# Patient Record
Sex: FEMALE | Race: WHITE | NOT HISPANIC OR LATINO | Employment: FULL TIME | URBAN - METROPOLITAN AREA
[De-identification: names, ages, dates, MRNs, and addresses within clinical notes are randomized per-mention and may not be internally consistent; named-entity substitution may affect disease eponyms.]

---

## 2017-01-12 DIAGNOSIS — I10 HYPERTENSION, ESSENTIAL: Primary | ICD-10-CM

## 2017-01-12 RX ORDER — METOPROLOL TARTRATE 25 MG/1
25 TABLET, FILM COATED ORAL 2 TIMES DAILY
Qty: 180 TABLET | Refills: 3 | OUTPATIENT
Start: 2017-01-12 | End: 2018-05-23 | Stop reason: SDUPTHER

## 2017-01-13 ENCOUNTER — CLINICAL SUPPORT (OUTPATIENT)
Dept: ELECTROPHYSIOLOGY | Facility: CLINIC | Age: 67
End: 2017-01-13
Payer: COMMERCIAL

## 2017-01-13 DIAGNOSIS — Z95.0 CARDIAC PACEMAKER IN SITU: ICD-10-CM

## 2017-01-13 DIAGNOSIS — I49.5 SSS (SICK SINUS SYNDROME): ICD-10-CM

## 2017-01-13 PROCEDURE — 93293 PM PHONE R-STRIP DEVICE EVAL: CPT | Mod: S$GLB,,, | Performed by: INTERNAL MEDICINE

## 2017-02-01 ENCOUNTER — PATIENT MESSAGE (OUTPATIENT)
Dept: INTERNAL MEDICINE | Facility: CLINIC | Age: 67
End: 2017-02-01

## 2017-02-01 DIAGNOSIS — I10 ESSENTIAL HYPERTENSION: Chronic | ICD-10-CM

## 2017-02-01 RX ORDER — LISINOPRIL 20 MG/1
20 TABLET ORAL DAILY
COMMUNITY
End: 2017-02-01 | Stop reason: SDUPTHER

## 2017-02-02 RX ORDER — LISINOPRIL 20 MG/1
20 TABLET ORAL DAILY
Qty: 90 TABLET | Refills: 3 | Status: SHIPPED | OUTPATIENT
Start: 2017-02-02 | End: 2018-01-22 | Stop reason: SDUPTHER

## 2017-02-07 ENCOUNTER — OFFICE VISIT (OUTPATIENT)
Dept: PSYCHIATRY | Facility: CLINIC | Age: 67
End: 2017-02-07
Payer: COMMERCIAL

## 2017-02-07 DIAGNOSIS — F41.9 ANXIETY: ICD-10-CM

## 2017-02-07 DIAGNOSIS — F98.8 ADD (ATTENTION DEFICIT DISORDER): ICD-10-CM

## 2017-02-07 DIAGNOSIS — F33.41 MAJOR DEPRESSIVE DISORDER, RECURRENT EPISODE, IN PARTIAL REMISSION: Primary | ICD-10-CM

## 2017-02-07 PROCEDURE — 90834 PSYTX W PT 45 MINUTES: CPT | Mod: S$GLB,,, | Performed by: SOCIAL WORKER

## 2017-02-07 NOTE — PROGRESS NOTES
Individual Psychotherapy (PhD/LCSW)    2/7/2017    Site: [facility]    Therapeutic Intervention: Met with patient alone.  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     Treatment plan:  · Target symptoms: depression, anxiety, distractibility  · Why chosen therapy is appropriate versus another modality: relevant to diagnosis  · Outcome monitoring methods: self-report  · Therapeutic intervention type: insight oriented psychotherapy    Risk parameters:  Patient reports no suicidal ideation  Patient reports no homicidal ideation  Patient reports no self-injurious behavior  Patient reports no violent behavior    Verbal deficits: None    Patient's response to intervention:  The patient's response to intervention is motivated    Progress toward goals and other mental status changes:  The patient's progress toward goals is good    Diagnosis: Major depression, recurrent, partial remission, anxiety,  ADD     Plan:  individual psychotherapy    Return to clinic: as scheduled

## 2017-02-17 ENCOUNTER — OFFICE VISIT (OUTPATIENT)
Dept: PSYCHIATRY | Facility: CLINIC | Age: 67
End: 2017-02-17
Payer: COMMERCIAL

## 2017-02-17 DIAGNOSIS — F33.1 MAJOR DEPRESSIVE DISORDER, RECURRENT, MODERATE: Primary | ICD-10-CM

## 2017-02-17 DIAGNOSIS — F98.8 ADD (ATTENTION DEFICIT DISORDER): ICD-10-CM

## 2017-02-17 DIAGNOSIS — F41.9 ANXIETY: ICD-10-CM

## 2017-02-17 PROCEDURE — 90834 PSYTX W PT 45 MINUTES: CPT | Mod: S$GLB,,, | Performed by: SOCIAL WORKER

## 2017-02-17 NOTE — PROGRESS NOTES
"Individual Psychotherapy (PhD/LCSW)    2/17/2017    Site: [facility]    Therapeutic Intervention: Met with patient alone.  Outpatient - Insight oriented psychotherapy 45 min - CPT code 02541    Chief complaint/reason for encounter: depression, anxiety, distractibility     Interval history and content of current session: Pt angry at things at work, in the world. Talk to pt about how her anger distracts her from her fear and her personal needs. Pt is fearful about her future, angry that "this is not the life I was supposed to have". 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 Work with pt around her issues with anger and underlying fear. Encourage self-acceptance and self-nurturing.    Treatment plan:  · Target symptoms: depression, anxiety, distractibility  · Why chosen therapy is appropriate versus another modality: relevant to diagnosis  · Outcome monitoring methods: self-report  · Therapeutic intervention type: insight oriented psychotherapy    Risk parameters:  Patient reports no suicidal ideation  Patient reports no homicidal ideation  Patient reports no self-injurious behavior  Patient reports no violent behavior    Verbal deficits: None    Patient's response to intervention:  The patient's response to intervention is motivated    Progress toward goals and other mental status changes:  The patient's progress toward goals is limited    Diagnosis: Major depression, recurrent moderate, anxiety,  ADD     Plan:  individual psychotherapy    Return to clinic: as scheduled  "

## 2017-02-24 ENCOUNTER — OFFICE VISIT (OUTPATIENT)
Dept: PSYCHIATRY | Facility: CLINIC | Age: 67
End: 2017-02-24
Payer: COMMERCIAL

## 2017-02-24 DIAGNOSIS — F33.1 MAJOR DEPRESSIVE DISORDER, RECURRENT, MODERATE: Primary | ICD-10-CM

## 2017-02-24 DIAGNOSIS — F41.9 ANXIETY: ICD-10-CM

## 2017-02-24 PROCEDURE — 99999 PR PBB SHADOW E&M-EST. PATIENT-LVL I: CPT | Mod: PBBFAC,,, | Performed by: SOCIAL WORKER

## 2017-02-24 PROCEDURE — 90834 PSYTX W PT 45 MINUTES: CPT | Mod: S$GLB,,, | Performed by: SOCIAL WORKER

## 2017-02-24 NOTE — PROGRESS NOTES
Individual Psychotherapy (PhD/LCSW)    2/24/2017    Site: [facility]    Therapeutic Intervention: Met with patient alone.  Outpatient - Insight oriented psychotherapy 45 min - CPT code 66508    Chief complaint/reason for encounter: depression, anxiety, distractibility     Interval history and content of current session: Pt considering what to say to supervisor who wants to discuss her comment in a meeting that she doesn't feel safe expressing herself. Help pt identify concerns she can voice and focus on solutions she can propose. Pt is cooperative and perceptive.    Treatment plan:  · Target symptoms: depression, anxiety, distractibility  · Why chosen therapy is appropriate versus another modality: relevant to diagnosis  · Outcome monitoring methods: self-report  · Therapeutic intervention type: insight oriented psychotherapy    Risk parameters:  Patient reports no suicidal ideation  Patient reports no homicidal ideation  Patient reports no self-injurious behavior  Patient reports no violent behavior    Verbal deficits: None    Patient's response to intervention:  The patient's response to intervention is motivated    Progress toward goals and other mental status changes:  The patient's progress toward goals is good    Diagnosis: Major depression, recurrent moderate, anxiety,  ADD     Plan:  individual psychotherapy    Return to clinic: as scheduled

## 2017-03-17 ENCOUNTER — PATIENT MESSAGE (OUTPATIENT)
Dept: CARDIOLOGY | Facility: CLINIC | Age: 67
End: 2017-03-17

## 2017-03-23 ENCOUNTER — TELEPHONE (OUTPATIENT)
Dept: CARDIOLOGY | Facility: CLINIC | Age: 67
End: 2017-03-23

## 2017-03-23 ENCOUNTER — LAB VISIT (OUTPATIENT)
Dept: LAB | Facility: HOSPITAL | Age: 67
End: 2017-03-23
Attending: INTERNAL MEDICINE
Payer: COMMERCIAL

## 2017-03-23 DIAGNOSIS — E78.5 HYPERLIPIDEMIA: Chronic | ICD-10-CM

## 2017-03-23 DIAGNOSIS — I48.0 PAF (PAROXYSMAL ATRIAL FIBRILLATION): Chronic | ICD-10-CM

## 2017-03-23 DIAGNOSIS — I10 ESSENTIAL HYPERTENSION: Chronic | ICD-10-CM

## 2017-03-23 LAB
ALBUMIN SERPL BCP-MCNC: 3.9 G/DL
ALP SERPL-CCNC: 86 U/L
ALT SERPL W/O P-5'-P-CCNC: 43 U/L
ANION GAP SERPL CALC-SCNC: 10 MMOL/L
AST SERPL-CCNC: 39 U/L
BILIRUB SERPL-MCNC: 0.5 MG/DL
BUN SERPL-MCNC: 28 MG/DL
CALCIUM SERPL-MCNC: 9.9 MG/DL
CHLORIDE SERPL-SCNC: 106 MMOL/L
CHOLEST/HDLC SERPL: 3.3 {RATIO}
CO2 SERPL-SCNC: 25 MMOL/L
CREAT SERPL-MCNC: 1.3 MG/DL
EST. GFR  (AFRICAN AMERICAN): 48.7 ML/MIN/1.73 M^2
EST. GFR  (NON AFRICAN AMERICAN): 42.3 ML/MIN/1.73 M^2
GLUCOSE SERPL-MCNC: 108 MG/DL
HDL/CHOLESTEROL RATIO: 30.5 %
HDLC SERPL-MCNC: 203 MG/DL
HDLC SERPL-MCNC: 62 MG/DL
LDLC SERPL CALC-MCNC: 123.4 MG/DL
NONHDLC SERPL-MCNC: 141 MG/DL
POTASSIUM SERPL-SCNC: 4.8 MMOL/L
PROT SERPL-MCNC: 7.4 G/DL
SODIUM SERPL-SCNC: 141 MMOL/L
TRIGL SERPL-MCNC: 88 MG/DL
TSH SERPL DL<=0.005 MIU/L-ACNC: 1.92 UIU/ML

## 2017-03-23 PROCEDURE — 80053 COMPREHEN METABOLIC PANEL: CPT

## 2017-03-23 PROCEDURE — 36415 COLL VENOUS BLD VENIPUNCTURE: CPT

## 2017-03-23 PROCEDURE — 84443 ASSAY THYROID STIM HORMONE: CPT

## 2017-03-23 PROCEDURE — 80061 LIPID PANEL: CPT

## 2017-03-23 NOTE — TELEPHONE ENCOUNTER
----- Message from Natali Comer MD sent at 3/23/2017 12:18 PM CDT -----  Please review.  We will discuss the results during your upcoming visit with me. It looks good.

## 2017-03-27 ENCOUNTER — OFFICE VISIT (OUTPATIENT)
Dept: CARDIOLOGY | Facility: CLINIC | Age: 67
End: 2017-03-27
Payer: COMMERCIAL

## 2017-03-27 VITALS
DIASTOLIC BLOOD PRESSURE: 80 MMHG | HEART RATE: 80 BPM | SYSTOLIC BLOOD PRESSURE: 136 MMHG | HEIGHT: 69 IN | BODY MASS INDEX: 27.62 KG/M2 | WEIGHT: 186.5 LBS

## 2017-03-27 DIAGNOSIS — I44.0 AV BLOCK, 1ST DEGREE: Chronic | ICD-10-CM

## 2017-03-27 DIAGNOSIS — G47.33 OSA (OBSTRUCTIVE SLEEP APNEA): ICD-10-CM

## 2017-03-27 DIAGNOSIS — I10 ESSENTIAL HYPERTENSION: Primary | Chronic | ICD-10-CM

## 2017-03-27 DIAGNOSIS — E78.2 MIXED HYPERLIPIDEMIA: Chronic | ICD-10-CM

## 2017-03-27 DIAGNOSIS — I49.5 SSS (SICK SINUS SYNDROME): Chronic | ICD-10-CM

## 2017-03-27 DIAGNOSIS — F32.A DEPRESSION, UNSPECIFIED DEPRESSION TYPE: ICD-10-CM

## 2017-03-27 DIAGNOSIS — I48.0 PAF (PAROXYSMAL ATRIAL FIBRILLATION): Chronic | ICD-10-CM

## 2017-03-27 DIAGNOSIS — Z95.0 S/P CARDIAC PACEMAKER PROCEDURE: Chronic | ICD-10-CM

## 2017-03-27 PROCEDURE — 99213 OFFICE O/P EST LOW 20 MIN: CPT | Mod: S$GLB,,, | Performed by: INTERNAL MEDICINE

## 2017-03-27 PROCEDURE — 99999 PR PBB SHADOW E&M-EST. PATIENT-LVL III: CPT | Mod: PBBFAC,,, | Performed by: INTERNAL MEDICINE

## 2017-03-27 NOTE — PROGRESS NOTES
Subjective:   Patient ID:  Manisha Wynne is a 66 y.o. female who presents for follow-up of Hypertension      HPI: Doing well. NO symptoms. Maintains weight, exercises.    Lab Results   Component Value Date     03/23/2017    K 4.8 03/23/2017     03/23/2017    CO2 25 03/23/2017    BUN 28 (H) 03/23/2017    CREATININE 1.3 03/23/2017     03/23/2017    HGBA1C 5.6 02/26/2016    MG 2.5 09/07/2012    AST 39 03/23/2017    ALT 43 03/23/2017    ALBUMIN 3.9 03/23/2017    PROT 7.4 03/23/2017    BILITOT 0.5 03/23/2017    WBC 6.77 12/06/2016    HGB 11.7 (L) 12/06/2016    HCT 34.3 (L) 12/06/2016    MCV 88 12/06/2016     12/06/2016    INR 1.1 09/06/2012    TSH 1.915 03/23/2017    CHOL 203 (H) 03/23/2017    HDL 62 03/23/2017    LDLCALC 123.4 03/23/2017    TRIG 88 03/23/2017       Review of Systems   Constitution: Positive for malaise/fatigue.   HENT: Negative.    Eyes: Negative.    Cardiovascular: Negative.  Negative for chest pain, claudication, dyspnea on exertion, irregular heartbeat, leg swelling, near-syncope, palpitations and syncope.   Respiratory: Negative.  Negative for cough, shortness of breath, snoring and wheezing.    Endocrine: Negative.  Negative for cold intolerance, heat intolerance, polydipsia, polyphagia and polyuria.   Skin: Negative.    Musculoskeletal: Positive for back pain and muscle cramps.   Gastrointestinal: Positive for heartburn.   Genitourinary: Negative.    Neurological: Positive for loss of balance.   Psychiatric/Behavioral: Positive for depression. The patient is nervous/anxious.        Current Outpatient Prescriptions:     aspirin (ECOTRIN) 81 MG EC tablet, Take 81 mg by mouth. 1 Tablet, Delayed Release (E.C.) Oral Every day, Disp: , Rfl:     buPROPion (WELLBUTRIN XL) 300 MG 24 hr tablet, Take 300 mg by mouth once daily., Disp: , Rfl:     calcium-vitamin D 600 mg(1,500mg) -200 unit Tab, Take 1 tablet by mouth once daily. 2 Tablets Oral Every day, Disp: , Rfl:     " chlorhexidine (PERIDEX) 0.12 % solution, Use as directed 15 mLs in the mouth or throat 2 (two) times daily. , Disp: , Rfl:     dicyclomine (BENTYL) 20 mg tablet, Take 1 tablet (20 mg total) by mouth 3 (three) times daily as needed. (Patient taking differently: Take 20 mg by mouth 3 (three) times daily as needed. Pt taking prn.), Disp: 14 tablet, Rfl: 0    duloxetine (CYMBALTA) 30 MG capsule, Take 30 mg by mouth once daily. , Disp: , Rfl:     duloxetine (CYMBALTA) 60 MG capsule, 1 Capsule, Delayed Release(E.C.) Oral Every day, Disp: 30 capsule, Rfl: 3    lactulose (CHRONULAC) 20 gram/30 mL Soln, Take 15 mLs (10 g total) by mouth 2 (two) times daily as needed (Constipation)., Disp: 150 mL, Rfl: 0    lisdexamfetamine (VYVANSE) 70 MG capsule, Take 70 mg by mouth every morning., Disp: , Rfl:     lisinopril (PRINIVIL,ZESTRIL) 20 MG tablet, Take 1 tablet (20 mg total) by mouth once daily., Disp: 90 tablet, Rfl: 3    metoprolol tartrate (LOPRESSOR) 25 MG tablet, Take 1 tablet (25 mg total) by mouth 2 (two) times daily., Disp: 180 tablet, Rfl: 3    multivitamin-minerals-lutein (CENTRUM SILVER) Tab, Take by mouth. 1 Tablet Oral Every day, Disp: , Rfl:     ondansetron (ZOFRAN) 4 MG tablet, Take 1 tablet (4 mg total) by mouth every 6 (six) hours as needed for Nausea., Disp: 10 tablet, Rfl: 0    pravastatin (PRAVACHOL) 10 MG tablet, Take 1 tablet (10 mg total) by mouth once daily., Disp: 90 tablet, Rfl: 3    vitamin C-biotin (HAIR-SKIN-NAILS, VIT C-BIOTIN,) 50 mg -1,250 mcg Chew, Take by mouth once daily., Disp: , Rfl:     Objective:   Physical Exam   Constitutional: She is oriented to person, place, and time. She appears well-developed and well-nourished.   /80  Pulse 80  Ht 5' 8.5" (1.74 m)  Wt 84.6 kg (186 lb 8.2 oz)  LMP 10/01/2003  BMI 27.95 kg/m2     HENT:   Head: Normocephalic.   Eyes: Pupils are equal, round, and reactive to light.   Neck: Normal range of motion. Neck supple. Carotid bruit is not " present. No thyromegaly present.   Cardiovascular: Normal rate, regular rhythm, normal heart sounds and intact distal pulses.  Exam reveals no gallop and no friction rub.    No murmur heard.  Pulses:       Carotid pulses are 2+ on the right side, and 2+ on the left side.       Radial pulses are 2+ on the right side, and 2+ on the left side.        Femoral pulses are 2+ on the right side, and 2+ on the left side.       Popliteal pulses are 2+ on the right side, and 2+ on the left side.        Dorsalis pedis pulses are 2+ on the right side, and 2+ on the left side.        Posterior tibial pulses are 2+ on the right side, and 2+ on the left side.   Pulmonary/Chest: Effort normal and breath sounds normal. No respiratory distress. She has no wheezes. She has no rales. She exhibits no tenderness.   Abdominal: Soft. Bowel sounds are normal.   Musculoskeletal: Normal range of motion. She exhibits no edema.   Neurological: She is alert and oriented to person, place, and time.   Skin: Skin is warm and dry.   Psychiatric: She has a normal mood and affect.   Nursing note and vitals reviewed.      Assessment and Plan:     Problem List Items Addressed This Visit        Cardiology Problems    Hypertension - Primary (Chronic)    Relevant Orders    TSH    Comprehensive metabolic panel    Lipid panel    Hyperlipidemia (Chronic)    Relevant Orders    TSH    Comprehensive metabolic panel    Lipid panel    PAF (paroxysmal atrial fibrillation) (Chronic)    SSS (sick sinus syndrome) (Chronic)    AV block, 1st degree (Chronic)       Other    S/P cardiac pacemaker procedure (Chronic)    Depression    CEDRIC (obstructive sleep apnea)          Return in about 1 year (around 3/27/2018).

## 2017-03-27 NOTE — MR AVS SNAPSHOT
[place] [facility]   [facility]  [place] [address]  Phone: [phone]                  Manisha Wynne   3/27/2017 2:30 PM   Office Visit    Description:  Female : 1950   Provider:  Natali Comer MD   Department:  [place] [facility]           Reason for Visit     Hypertension                To Do List           Future Appointments        Provider Department Dept Phone    2017 9:00 AM TELEPHONE CHECK, PACEMAKER Petar Baezy - Arrhythmia [phone]    2017 8:00 AM Roxi Kapoor NP [facility] [phone]      Goals (5 Years of Data)     None      [facility] On Call     [facility] On Call Nurse Care Line -  Assistance  Registered nurses in the [facility] On Call Center provide clinical advisement, health education, appointment booking, and other advisory services.  Call for this free service at [phone].             Medications           Message regarding Medications     Verify the changes and/or additions to your medication regime listed below are the same as discussed with your clinician today.  If any of these changes or additions are incorrect, please notify your healthcare provider.        STOP taking these medications     biotin 5 mg Tab Take 5,000 mg by mouth. 1 Tablet Oral Every day           Verify that the below list of medications is an accurate representation of the medications you are currently taking.  If none reported, the list may be blank. If incorrect, please contact your healthcare provider. Carry this list with you in case of emergency.           Current Medications     aspirin (ECOTRIN) 81 MG EC tablet Take 81 mg by mouth. 1 Tablet, Delayed Release (E.C.) Oral Every day    buPROPion (WELLBUTRIN XL) 300 MG 24 hr tablet Take 300 mg by mouth once daily.    calcium-vitamin D 600 mg(1,500mg) -200 unit Tab Take 1 tablet by mouth once daily. 2 Tablets Oral Every day    chlorhexidine (PERIDEX) 0.12 % solution Use as directed  "15 mLs in the mouth or throat 2 (two) times daily.     dicyclomine (BENTYL) 20 mg tablet Take 1 tablet (20 mg total) by mouth 3 (three) times daily as needed.    duloxetine (CYMBALTA) 30 MG capsule Take 30 mg by mouth once daily.     duloxetine (CYMBALTA) 60 MG capsule 1 Capsule, Delayed Release(E.C.) Oral Every day    lactulose (CHRONULAC) 20 gram/30 mL Soln Take 15 mLs (10 g total) by mouth 2 (two) times daily as needed (Constipation).    lisdexamfetamine (VYVANSE) 70 MG capsule Take 70 mg by mouth every morning.    lisinopril (PRINIVIL,ZESTRIL) 20 MG tablet Take 1 tablet (20 mg total) by mouth once daily.    metoprolol tartrate (LOPRESSOR) 25 MG tablet Take 1 tablet (25 mg total) by mouth 2 (two) times daily.    multivitamin-minerals-lutein (CENTRUM SILVER) Tab Take by mouth. 1 Tablet Oral Every day    ondansetron (ZOFRAN) 4 MG tablet Take 1 tablet (4 mg total) by mouth every 6 (six) hours as needed for Nausea.    pravastatin (PRAVACHOL) 10 MG tablet Take 1 tablet (10 mg total) by mouth once daily.    vitamin C-biotin (HAIR-SKIN-NAILS, VIT C-BIOTIN,) 50 mg -1,250 mcg Chew Take by mouth once daily.           Clinical Reference Information           Your Vitals Were     BP Pulse Height Weight Last Period BMI    136/80 80 5' 8.5" (1.74 m) 84.6 kg (186 lb 8.2 oz) 10/01/2003 27.95 kg/m2      Blood Pressure          Most Recent Value    Right Arm BP - Sitting  124/76    Left Arm BP - Sitting  136/80    BP  136/80      Allergies as of 3/27/2017     No Known Allergies      Immunizations Administered on Date of Encounter - 3/27/2017     None      Language Assistance Services     ATTENTION: Language assistance services are available, free of charge. Please call [phone].      ATENCIÓN: Si ila scott, tiene a sinha disposición servicios gratuitos de asistencia lingüística. Llame al [phone].     CHÚ Ý: N?u b?n nói Ti?ng Vi?t, có các d?ch v? h? tr? ngôn ng? mi?n phí zacharyh cho b?n. G?i s? [phone].         " [facility] complies with applicable Federal civil rights laws and does not discriminate on the basis of race, color, national origin, age, disability, or sex.

## 2017-03-31 ENCOUNTER — OFFICE VISIT (OUTPATIENT)
Dept: PSYCHIATRY | Facility: CLINIC | Age: 67
End: 2017-03-31
Payer: COMMERCIAL

## 2017-03-31 DIAGNOSIS — F41.9 ANXIETY: ICD-10-CM

## 2017-03-31 DIAGNOSIS — F33.1 MAJOR DEPRESSIVE DISORDER, RECURRENT, MODERATE: Primary | ICD-10-CM

## 2017-03-31 DIAGNOSIS — F98.8 ADD (ATTENTION DEFICIT DISORDER): ICD-10-CM

## 2017-03-31 PROCEDURE — 90834 PSYTX W PT 45 MINUTES: CPT | Mod: S$GLB,,, | Performed by: SOCIAL WORKER

## 2017-03-31 NOTE — PROGRESS NOTES
Individual Psychotherapy (PhD/LCSW)    3/31/2017    Site: [facility]    Therapeutic Intervention: Met with patient alone.  Outpatient - Insight oriented psychotherapy 45 min - CPT code 48270    Chief complaint/reason for encounter: depression, anxiety, distractibility     Interval history and content of current session: Pt comes in 25 minutes late, is the only  on duty, exhausted. Discuss boundaries and accepting limits of what one person can do when there are multiple needs. Pt aware this is a challenge for her. She has made progress in getting to work more timely but is still about 15 minutes late. Discuss moving her alarm so that she can't hit the snooze button, and pt is receptive.    Treatment plan:  · Target symptoms: depression, anxiety, distractibility  · Why chosen therapy is appropriate versus another modality: relevant to diagnosis  · Outcome monitoring methods: self-report  · Therapeutic intervention type: insight oriented psychotherapy    Risk parameters:  Patient reports no suicidal ideation  Patient reports no homicidal ideation  Patient reports no self-injurious behavior  Patient reports no violent behavior    Verbal deficits: None    Patient's response to intervention:  The patient's response to intervention is motivated    Progress toward goals and other mental status changes:  The patient's progress toward goals is fair    Diagnosis: Major depression, recurrent moderate, anxiety,  ADD     Plan:  individual psychotherapy    Return to clinic: as scheduled

## 2017-04-12 ENCOUNTER — HOSPITAL ENCOUNTER (EMERGENCY)
Facility: HOSPITAL | Age: 67
Discharge: HOME | End: 2017-04-12
Attending: EMERGENCY MEDICINE | Admitting: EMERGENCY MEDICINE
Payer: COMMERCIAL

## 2017-04-12 VITALS
HEART RATE: 66 BPM | TEMPERATURE: 98 F | BODY MASS INDEX: 27.55 KG/M2 | HEIGHT: 69 IN | OXYGEN SATURATION: 100 % | RESPIRATION RATE: 18 BRPM | SYSTOLIC BLOOD PRESSURE: 152 MMHG | DIASTOLIC BLOOD PRESSURE: 72 MMHG | WEIGHT: 186 LBS

## 2017-04-12 DIAGNOSIS — S00.03XA SCALP HEMATOMA, INITIAL ENCOUNTER: Primary | ICD-10-CM

## 2017-04-12 DIAGNOSIS — S09.90XA HEAD INJURY: ICD-10-CM

## 2017-04-12 PROCEDURE — 99284 EMERGENCY DEPT VISIT MOD MDM: CPT

## 2017-04-12 PROCEDURE — 99285 EMERGENCY DEPT VISIT HI MDM: CPT | Mod: ,,, | Performed by: EMERGENCY MEDICINE

## 2017-04-12 PROCEDURE — 25000003 PHARM REV CODE 250: Performed by: EMERGENCY MEDICINE

## 2017-04-12 RX ORDER — ACETAMINOPHEN 325 MG/1
650 TABLET ORAL
Status: COMPLETED | OUTPATIENT
Start: 2017-04-12 | End: 2017-04-12

## 2017-04-12 RX ADMIN — ACETAMINOPHEN 650 MG: 325 TABLET ORAL at 05:04

## 2017-04-12 NOTE — ED PROVIDER NOTES
Encounter Date: 4/12/2017    SCRIBE #1 NOTE: I, Jerri Jacobs, am scribing for, and in the presence of, Dr. Russell.       History     Chief Complaint   Patient presents with    Fall     hit head, denies loc     Review of patient's allergies indicates:  No Known Allergies  HPI Comments: Time seen by provider: 5:16 PM    This is a 66 y.o. Female with a PMHx of HTN, 1st degree AV block, HLD, and atrial fibrillation and a PSHx of cardiac pacemaker placement who presents with complaint of headache and dizziness s/p mechanical fall with subsequent posterior head trauma PTA.  Patient states that she feels sore all over but cannot describe any other injury.  She denies LOC, abrasion or laceration. She is compliant with daily baby Aspirin. Patient reports she can ambulate fine.     The history is provided by the patient.     Past Medical History:   Diagnosis Date    Abnormal Pap smear     Atrial fibrillation     AV block, 1st degree 7/25/2012    Depression 7/24/2012    Facet arthritis of lumbar region 3/31/2015    Hyperlipidemia     Hypertension 7/24/2012    Other specified cardiac dysrhythmias     Syncope 7/24/2012     Past Surgical History:   Procedure Laterality Date    CARDIAC PACEMAKER PLACEMENT  9/12    DILATION AND CURETTAGE OF UTERUS      Hx of precancerous cells?    TONSILLECTOMY       Family History   Problem Relation Age of Onset    Adopted: Yes    Amblyopia Neg Hx     Blindness Neg Hx     Cataracts Neg Hx     Glaucoma Neg Hx     Macular degeneration Neg Hx     Retinal detachment Neg Hx      Social History   Substance Use Topics    Smoking status: Former Smoker     Packs/day: 0.25     Years: 15.00     Quit date: 7/25/1982    Smokeless tobacco: Never Used    Alcohol use Yes      Comment: occ     Review of Systems   Constitutional: Negative for fever.   HENT: Negative for nosebleeds.    Eyes: Negative for redness.   Respiratory: Negative for shortness of breath.    Cardiovascular: Negative for  chest pain.   Gastrointestinal: Negative for vomiting.   Genitourinary: Negative for dysuria.   Musculoskeletal: Negative for gait problem.   Skin: Negative for wound.   Neurological: Positive for dizziness and headaches. Negative for speech difficulty.       Physical Exam   Initial Vitals   BP Pulse Resp Temp SpO2   04/12/17 1625 04/12/17 1625 04/12/17 1625 04/12/17 1625 04/12/17 1625   118/66 60 18 98.6 °F (37 °C) 100 %     Physical Exam    Nursing note and vitals reviewed.  Constitutional: She appears well-developed and well-nourished. No distress.   HENT:   Head: Normocephalic and atraumatic.   Mouth/Throat: Oropharynx is clear and moist.   Eyes: Conjunctivae and EOM are normal. Pupils are equal, round, and reactive to light.   Neck: Normal range of motion. Neck supple.   Cardiovascular: Normal rate, regular rhythm, normal heart sounds and intact distal pulses.   Pulmonary/Chest: Breath sounds normal. No respiratory distress. She has no wheezes. She has no rales.   Musculoskeletal: Normal range of motion. She exhibits no edema or tenderness.   Neurological: She is alert and oriented to person, place, and time. She has normal strength. No cranial nerve deficit. She displays a negative Romberg sign. Coordination normal.   Normal finger to nose. Normal heel to shin.   Skin: Skin is warm and dry.   4 cm diameter hematoma in the posterior occipital region   Psychiatric: She has a normal mood and affect.         ED Course   Procedures  Labs Reviewed - No data to display       X-Rays:   Independently Interpreted Readings:   Head CT: No acute intracranial abnormality     Medical Decision Making:   History:   Old Medical Records: I decided to obtain old medical records.  Initial Assessment:   This is an emergent evaluation of a 66 y.o. female patient with presentation of mechanical fall and subsequent head injury to posterior scalp associated with dizziness and headache. No other serious injury. Patient is on Aspirin and  will need head CT to evaluate for intracranial process such as hemorrhage. Neurological exam unremarkable at this time.  Independently Interpreted Test(s):   I have ordered and independently interpreted X-rays - see prior notes.  Clinical Tests:   Radiological Study: Ordered and Reviewed            Scribe Attestation:   Scribe #1: I performed the above scribed service and the documentation accurately describes the services I performed. I attest to the accuracy of the note.    Attending Attestation:           Physician Attestation for Scribe:  Physician Attestation Statement for Scribe #1: I, Dr. Russell, reviewed documentation, as scribed by Jerri Jacobs in my presence, and it is both accurate and complete.         Attending ED Notes:   6:39 PM - Head CT showed no intracranial abnormality. I will discharge home with follow up with PCP. I advised on sleep monitoring x 24 hours. Return precautions given.           ED Course     Clinical Impression:   The primary encounter diagnosis was Scalp hematoma, initial encounter. A diagnosis of Head injury was also pertinent to this visit.    Disposition:   Disposition: Discharged  Condition: Stable       Rip Russell MD  04/15/17 0932

## 2017-04-12 NOTE — DISCHARGE INSTRUCTIONS
Scalp Contusion with Sleep Monitoring  A contusion is another word for bruise. It occurs when small blood vessels break open and leak blood into the nearby area. A scalp contusion can result from a bump, hit, or fall. Symptoms can include changes in skin color (bruising). For instance, the skin may turn blue or black. Swelling and pain may also occur.  Because the injury was to your head, it could have caused a mild brain injury (concussion). You dont have symptoms of a concussion at this time. But these can show up later. For the next 24 hours (or possibly longer), you and someone caring for you will need to watch for the symptoms listed below (see the Sleep Monitoring section).  The swelling from the contusion should go down in a few days. Bruising and pain may take longer to go away.  Home care  Sleep monitoring  Someone must stay with you for the next 24 hours (or longer, if directed). If you fall asleep, this person should wake you up every 2 hours to check for symptoms of concussion. These include:  · Headache  · Nausea or vomiting  · Dizziness  · Sensitivity to light or noise  · Unusual sleepiness or grogginess  · Trouble falling asleep  · Personality changes  · Vision changes  · Confusion  · Memory loss  · Trouble walking or clumsiness  · Loss of consciousness (even for a short time)  · Inability to be awakened  If any of these symptoms develop at any time, get emergency medical care right away. If no concussion symptoms are noted during the first 24 hours, keep watching for symptoms for the next day or so. Ask your provider if someone should stay with you during this time.  General care  · If you have been prescribed medicines for pain, take them as directed. Note: Dont take other medicines without talking to your provider first.  · To help reduce swelling and pain, apply a cold source to the injured area for up to 20 minutes at a time, every 1 to 2 hours, or as directed. Use a cold pack or bag of ice  wrapped in a thin towel. Never apply a cold source directly to the skin.  · For the next 24 hours (or longer, if instructed):  ¨ Dont drink alcohol or use sedatives or other medicines that make you sleepy.  ¨ Dont drive or operate machinery.  ¨ Dont do anything strenuous, such as heavy lifting or straining.  ¨ Limit tasks that require concentration. This includes reading, watching TV, using a smartphone or computer, and playing video games.  ¨ Dont return to sports, exercise, or other activity that could result in another injury.  Follow-up care  Follow up with your healthcare provider, or as directed. If imaging tests were done, they will be reviewed by a doctor. You will be told of the results and any new findings that may affect your care.  When to seek medical advice  Call your healthcare provider right away if any of these occur:  · Pain worsens or cant be relieved with medicines  · New or increased swelling or bruising  · Fever of 100.4°F (38°C) or higher, or as directed by your provider  · Redness, warmth, bleeding, or drainage from the injured area  · Any depression or bony abnormality in the injured area  · Fluid drainage or bleeding from the nose or ears  Call 911  Call 911 right away if any of these occur:  · Stiff neck  · Weakness or numbness in any part of the body  · Seizures  Date Last Reviewed: 5/7/2015  © 3989-7694 [employer]. [address] [place] [address]. All rights reserved. This information is not intended as a substitute for professional medical care. Always follow your healthcare professional's instructions.

## 2017-04-12 NOTE — ED TRIAGE NOTES
Pt reports fall around 3pm today. Pt reports hitting her head and falling on her back. Denies LOC.  Pt denies dizziness or vision changes. Pt reports nausea previously after fall but denies vomiting. Pt reports headache of 6/10

## 2017-04-12 NOTE — ED AVS SNAPSHOT
[facility]  [address]  [facility] [address]               Manisha Wynne   2017  5:00 PM   ED    Description:  Female : 1950   Department:  [facility]           Your Care was Coordinated By:     Provider Role From To    Rip Russell MD Attending Provider 17 [id number] --      Reason for Visit     Fall           Diagnoses this Visit        Comments    Scalp hematoma, initial encounter    -  Primary     Head injury           ED Disposition     None           To Do List           Follow-up Information     Call Iris Blue MD.    Specialty:  Internal Medicine    Why:  As needed    Contact information:    [address]  [facility] [address]  [phone]        [facility]     [facility] On Call Nurse Care Line -  Assistance  Unless otherwise directed by your provider, please contact [facility], our nurse care line that is available for  assistance.     Registered nurses in the [facility] provide: appointment scheduling, clinical advisement, health education, and other advisory services.  Call: [phone] (toll free)               Medications           Message regarding Medications     Verify the changes and/or additions to your medication regime listed below are the same as discussed with your clinician today.  If any of these changes or additions are incorrect, please notify your healthcare provider.        These medications were administered today        Dose Freq    acetaminophen tablet 650 mg 650 mg ED 1 Time    Sig: Take 2 tablets (650 mg total) by mouth ED 1 Time.    Class: Normal    Route: Oral           Verify that the below list of medications is an accurate representation of the medications you are currently taking.  If none reported, the list may be blank. If incorrect, please contact your healthcare provider. Carry this list with you in case of emergency.           Current  "Medications     aspirin (ECOTRIN) 81 MG EC tablet Take 81 mg by mouth. 1 Tablet, Delayed Release (E.C.) Oral Every day    buPROPion (WELLBUTRIN XL) 300 MG 24 hr tablet Take 300 mg by mouth once daily.    calcium-vitamin D 600 mg(1,500mg) -200 unit Tab Take 1 tablet by mouth once daily. 2 Tablets Oral Every day    chlorhexidine (PERIDEX) 0.12 % solution Use as directed 15 mLs in the mouth or throat 2 (two) times daily.     dicyclomine (BENTYL) 20 mg tablet Take 1 tablet (20 mg total) by mouth 3 (three) times daily as needed.    duloxetine (CYMBALTA) 30 MG capsule Take 30 mg by mouth once daily.     duloxetine (CYMBALTA) 60 MG capsule 1 Capsule, Delayed Release(E.C.) Oral Every day    lactulose (CHRONULAC) 20 gram/30 mL Soln Take 15 mLs (10 g total) by mouth 2 (two) times daily as needed (Constipation).    lisdexamfetamine (VYVANSE) 70 MG capsule Take 70 mg by mouth every morning.    lisinopril (PRINIVIL,ZESTRIL) 20 MG tablet Take 1 tablet (20 mg total) by mouth once daily.    metoprolol tartrate (LOPRESSOR) 25 MG tablet Take 1 tablet (25 mg total) by mouth 2 (two) times daily.    multivitamin-minerals-lutein (CENTRUM SILVER) Tab Take by mouth. 1 Tablet Oral Every day    ondansetron (ZOFRAN) 4 MG tablet Take 1 tablet (4 mg total) by mouth every 6 (six) hours as needed for Nausea.    pravastatin (PRAVACHOL) 10 MG tablet Take 1 tablet (10 mg total) by mouth once daily.    vitamin C-biotin (HAIR-SKIN-NAILS, VIT C-BIOTIN,) 50 mg -1,250 mcg Chew Take by mouth once daily.           Clinical Reference Information           Your Vitals Were     BP Pulse Temp Resp Height Weight    118/66 60 98.6 °F (37 °C) (Oral) 18 5' 8.5" (1.74 m) 84.4 kg (186 lb)    Last Period SpO2 BMI          10/01/2003 100% 27.87 kg/m2        Allergies as of 4/12/2017     No Known Allergies      Immunizations Administered on Date of Encounter - 4/12/2017     None      ED Micro, Lab, POCT     None      ED Imaging Orders     Start Ordered       Status " Ordering Provider    04/12/17 1719 04/12/17 1718  CT Head Without Contrast  1 time imaging      Final result         Discharge Instructions         Scalp Contusion with Sleep Monitoring  A contusion is another word for bruise. It occurs when small blood vessels break open and leak blood into the nearby area. A scalp contusion can result from a bump, hit, or fall. Symptoms can include changes in skin color (bruising). For instance, the skin may turn blue or black. Swelling and pain may also occur.  Because the injury was to your head, it could have caused a mild brain injury (concussion). You dont have symptoms of a concussion at this time. But these can show up later. For the next 24 hours (or possibly longer), you and someone caring for you will need to watch for the symptoms listed below (see the Sleep Monitoring section).  The swelling from the contusion should go down in a few days. Bruising and pain may take longer to go away.  Home care  Sleep monitoring  Someone must stay with you for the next 24 hours (or longer, if directed). If you fall asleep, this person should wake you up every 2 hours to check for symptoms of concussion. These include:  · Headache  · Nausea or vomiting  · Dizziness  · Sensitivity to light or noise  · Unusual sleepiness or grogginess  · Trouble falling asleep  · Personality changes  · Vision changes  · Confusion  · Memory loss  · Trouble walking or clumsiness  · Loss of consciousness (even for a short time)  · Inability to be awakened  If any of these symptoms develop at any time, get emergency medical care right away. If no concussion symptoms are noted during the first 24 hours, keep watching for symptoms for the next day or so. Ask your provider if someone should stay with you during this time.  General care  · If you have been prescribed medicines for pain, take them as directed. Note: Dont take other medicines without talking to your provider first.  · To help reduce swelling  and pain, apply a cold source to the injured area for up to 20 minutes at a time, every 1 to 2 hours, or as directed. Use a cold pack or bag of ice wrapped in a thin towel. Never apply a cold source directly to the skin.  · For the next 24 hours (or longer, if instructed):  ¨ Dont drink alcohol or use sedatives or other medicines that make you sleepy.  ¨ Dont drive or operate machinery.  ¨ Dont do anything strenuous, such as heavy lifting or straining.  ¨ Limit tasks that require concentration. This includes reading, watching TV, using a smartphone or computer, and playing video games.  ¨ Dont return to sports, exercise, or other activity that could result in another injury.  Follow-up care  Follow up with your healthcare provider, or as directed. If imaging tests were done, they will be reviewed by a doctor. You will be told of the results and any new findings that may affect your care.  When to seek medical advice  Call your healthcare provider right away if any of these occur:  · Pain worsens or cant be relieved with medicines  · New or increased swelling or bruising  · Fever of 100.4°F (38°C) or higher, or as directed by your provider  · Redness, warmth, bleeding, or drainage from the injured area  · Any depression or bony abnormality in the injured area  · Fluid drainage or bleeding from the nose or ears  Call 911  Call 911 right away if any of these occur:  · Stiff neck  · Weakness or numbness in any part of the body  · Seizures  Date Last Reviewed: 5/7/2015  © 7719-6053 [employer] [address] [place] [address], [place] [address]. All rights reserved. This information is not intended as a substitute for professional medical care. Always follow your healthcare professional's instructions.          Your Scheduled Appointments     Apr 19, 2017  9:00 AM CDT   Telephonic Pacemaker Check with TELEPHONE CHECK, PACEMAKER   [facility] ([facility] )    [address]  [place]  [place] [address]   [phone]            Jun 29, 2017  8:00 AM CDT   Established Patient Visit with Roxi Kapoor NP   [facility] ([facility])    [address] [place] [address] [address]  [place] [address]   [phone]               [facility] complies with applicable Federal civil rights laws and does not discriminate on the basis of race, color, national origin, age, disability, or sex.        Language Assistance Services     ATTENTION: Language assistance services are available, free of charge. Please call [phone].      ATENCIÓN: Si habla español, tiene a sinha disposición servicios gratuitos de asistencia lingüística. Llame al [phone].     CHÚ Ý: N?u b?n nói Ti?ng Vi?t, có các d?ch v? h? tr? ngôn ng? mi?n phí dành cho b?n. G?i s? [phone].

## 2017-04-12 NOTE — ED NOTES
Two patient identifiers checked and confirmed.    APPEARANCE: Resting comfortably in no acute distress. Patient has clean hair, skin and nails. Clothing is appropriate and properly fastened.  NEURO: Awake, alert, appropriate for age, and cooperative with a calm affect; pupils equal and round.  HEENT: Head symmetrical. Bilateral eyes without redness or drainage. Pt reports headache of 6/10. Dime sized bump to the middle forehead. Quarter sized bump to the occipital region.  CARDIAC: Regular rate and rhythm.  RESPIRATORY: Airway is open and patent. Respirations are spontaneous on room air. Normal respiratory effort and rate noted.  GI/: Abdomen soft and non-distended. Patient is reported to void and stool appropriately for age.  NEUROVASCULAR: All extremities are warm and pink with +2 pulses and capillary refill less than 3 seconds.  MUSCULOSKELETAL: Moves all extremities well; no obvious deformities noted.  SKIN: Warm and dry, adequate turgor, mucus membranes moist and pink.

## 2017-04-19 ENCOUNTER — OFFICE VISIT (OUTPATIENT)
Dept: INTERNAL MEDICINE | Facility: CLINIC | Age: 67
End: 2017-04-19
Payer: COMMERCIAL

## 2017-04-19 ENCOUNTER — CLINICAL SUPPORT (OUTPATIENT)
Dept: ELECTROPHYSIOLOGY | Facility: CLINIC | Age: 67
End: 2017-04-19
Payer: COMMERCIAL

## 2017-04-19 VITALS
BODY MASS INDEX: 26.98 KG/M2 | WEIGHT: 178 LBS | HEIGHT: 68 IN | DIASTOLIC BLOOD PRESSURE: 63 MMHG | HEART RATE: 68 BPM | SYSTOLIC BLOOD PRESSURE: 97 MMHG

## 2017-04-19 DIAGNOSIS — H61.20 IMPACTED CERUMEN, UNSPECIFIED LATERALITY: Primary | ICD-10-CM

## 2017-04-19 DIAGNOSIS — I95.9 HYPOTENSION, UNSPECIFIED HYPOTENSION TYPE: ICD-10-CM

## 2017-04-19 DIAGNOSIS — I48.0 PAF (PAROXYSMAL ATRIAL FIBRILLATION): Chronic | ICD-10-CM

## 2017-04-19 DIAGNOSIS — Z95.0 CARDIAC PACEMAKER IN SITU: Primary | ICD-10-CM

## 2017-04-19 DIAGNOSIS — D22.9 NEVUS: ICD-10-CM

## 2017-04-19 DIAGNOSIS — Z12.31 VISIT FOR SCREENING MAMMOGRAM: ICD-10-CM

## 2017-04-19 DIAGNOSIS — I49.5 SSS (SICK SINUS SYNDROME): Chronic | ICD-10-CM

## 2017-04-19 PROCEDURE — 99213 OFFICE O/P EST LOW 20 MIN: CPT | Mod: S$GLB,,, | Performed by: INTERNAL MEDICINE

## 2017-04-19 PROCEDURE — 93293 PM PHONE R-STRIP DEVICE EVAL: CPT | Mod: S$GLB,,, | Performed by: INTERNAL MEDICINE

## 2017-04-19 PROCEDURE — 99999 PR PBB SHADOW E&M-EST. PATIENT-LVL V: CPT | Mod: PBBFAC,,, | Performed by: INTERNAL MEDICINE

## 2017-04-19 NOTE — MR AVS SNAPSHOT
[facility] [address]  [address]  [address]  [facility] [address]  Phone: [phone]                  Manisha Wynne   2017 11:00 AM   Office Visit    Description:  Female : 1950   Provider:  Iris Blue MD   Department:  [facility] [address]           Reason for Visit     Follow-up           Diagnoses this Visit        Comments    Impacted cerumen, unspecified laterality    -  Primary     Visit for screening mammogram         Nevus         Hypotension, unspecified hypotension type                To Do List           Future Appointments        Provider Department Dept Phone    2017 9:40 AM [facility] MAMMO2 SCREEN [facility] [phone]    2017 8:00 AM Roxi Kapoor NP [facility] [phone]    2017 8:20 AM Shena Robin MD [facility] - Dermatology [phone]    2017 8:00 AM Iris Blue MD [facility] [address] [phone]      Goals (5 Years of Data)     None      Follow-Up and Disposition     Return in about 6 months (around 10/19/2017).      [facility]     [facility] Nurse Care Line -  Assistance  Unless otherwise directed by your provider, please contact [facility], our nurse care line that is available for  assistance.     Registered nurses in the [facility] provide: appointment scheduling, clinical advisement, health education, and other advisory services.  Call: [phone] (toll free)               Medications           Message regarding Medications     Verify the changes and/or additions to your medication regime listed below are the same as discussed with your clinician today.  If any of these changes or additions are incorrect, please notify your healthcare provider.             Verify that the below list of medications is an accurate representation of the medications you are currently taking.  If none reported,  "the list may be blank. If incorrect, please contact your healthcare provider. Carry this list with you in case of emergency.           Current Medications     aspirin (ECOTRIN) 81 MG EC tablet Take 81 mg by mouth. 1 Tablet, Delayed Release (E.C.) Oral Every day    buPROPion (WELLBUTRIN XL) 300 MG 24 hr tablet Take 300 mg by mouth once daily.    calcium-vitamin D 600 mg(1,500mg) -200 unit Tab Take 1 tablet by mouth once daily. 2 Tablets Oral Every day    chlorhexidine (PERIDEX) 0.12 % solution Use as directed 15 mLs in the mouth or throat 2 (two) times daily.     dicyclomine (BENTYL) 20 mg tablet Take 1 tablet (20 mg total) by mouth 3 (three) times daily as needed.    duloxetine (CYMBALTA) 30 MG capsule Take 30 mg by mouth once daily.     duloxetine (CYMBALTA) 60 MG capsule 1 Capsule, Delayed Release(E.C.) Oral Every day    lactulose (CHRONULAC) 20 gram/30 mL Soln Take 15 mLs (10 g total) by mouth 2 (two) times daily as needed (Constipation).    lisdexamfetamine (VYVANSE) 70 MG capsule Take 70 mg by mouth every morning.    lisinopril (PRINIVIL,ZESTRIL) 20 MG tablet Take 1 tablet (20 mg total) by mouth once daily.    metoprolol tartrate (LOPRESSOR) 25 MG tablet Take 1 tablet (25 mg total) by mouth 2 (two) times daily.    multivitamin-minerals-lutein (CENTRUM SILVER) Tab Take by mouth. 1 Tablet Oral Every day    ondansetron (ZOFRAN) 4 MG tablet Take 1 tablet (4 mg total) by mouth every 6 (six) hours as needed for Nausea.    pravastatin (PRAVACHOL) 10 MG tablet Take 1 tablet (10 mg total) by mouth once daily.    vitamin C-biotin (HAIR-SKIN-NAILS, VIT C-BIOTIN,) 50 mg -1,250 mcg Chew Take by mouth once daily.           Clinical Reference Information           Your Vitals Were     BP Pulse Height Weight Last Period BMI    97/63 68 5' 8" (1.727 m) 80.7 kg (178 lb) 10/01/2003 27.06 kg/m2      Blood Pressure          Most Recent Value    BP  97/63      Allergies as of 4/19/2017     No Known Allergies      Immunizations " Administered on Date of Encounter - 4/19/2017     None      Orders Placed During Today's Visit      Normal Orders This Visit    Ambulatory consult to Dermatology     Ambulatory consult to ENT     Assign HDMP Onboarding Questionnaire Series     Hypertension Digital Medicine (HDMP)  Enrollment Order     Future Labs/Procedures Expected by Expires    Mammo Digital Screening Bilat with CAD  4/19/2017 6/20/2018      Hypertension Digital Medicine Program Information              As discussed, you could benefit from enrolling in the Hypertension Digital Medicine Program. The goal of the program is to help you effectively manage your high blood pressure through an appropriate balance of medication and lifestyle changes, all from the comfort of your own home. Effectively managed blood pressure reduces your risk of having a heart attack or a stroke in the future, so you can enjoy a long and healthy life. We want to make blood pressure control your goal.        What is the Hypertension Digital Medicine Program?  Finding the right balance in managing high blood pressure is different for every person, and we will tailor our treatment approach based on your individual needs using the most current evidence-based guidelines.  As a participant, you will be able to send home blood pressure readings, on your schedule, directly into your medical record at [facility]. I, along with a team of pharmacists, will monitor this data, help you adjust your medication(s) and/or make lifestyle recommendations to better manage your hypertension.       What are the requirements of the program?   Participating in the program is as easy as 1 - 2 - 3.   1. Smartphone - You must have your own smartphone to participate (either an iPhone or an Android phone such as Retrophin, Xi3, HTC, [employer], [employer]y, or DLC).    2. [facility] account - [facility] offers a great way to connect through the online patient portal, [facility], which is free and provides you  "access to your [facility] medical record.  3. Digital blood pressure cuff - Using this blood pressure cuff that hooks up to your smartphone, you will be able to send in your home blood pressure readings to the Hypertension Digital Medicine Team. Cuffs are available for purchase at the [facility] locations and financial assistance plans are available           What can I do to get started?   Complete the Hypertension Digital Medicine Patient Consent questionnaire already available in your [facility] account. To access and complete this questionnaire, either  - Use the [facility] website on a computer and select My Medical Record, then Questionnaires.  - Use the [employer] bree on your smartphone and select "Questionnaires".    Once you have given consent, additional onboarding questionnaires will be assigned to you to complete prior to starting the program. You must return to the "Questionnaires" page of your [facility] account to start the additional questionnaires.     How do I purchase a digital blood pressure cuff and obtain a discount?  [facility] has negotiated a discounted price from industry leading healthcare technology companies. While supplies last patients using an Apple iPhone can purchase an [employer] Ease Blood Pressure cuff for $31.99 (originally $39.99) and Android users can purchase the [employer] Blood Pressure Monitor for $79.99 (originally $99.99).  Financial assistance plans are also available for increased discounting. Purchase locations will be sent once all onboarding questionnaires have been completed (see above).    If you have any questions regarding this program or would like more information, please visit our Hypertension Digital Medicine website ([email or web address]) or call Digital Medicine Patient Support at [phone].          Language Assistance Services     ATTENTION: Language assistance services are available, free of charge. Please call [phone].  "     ATENCIÓN: Si habla español, tiene a sinha disposición servicios gratuitos de asistencia lingüística. Jazmín al [phone].     CHÚ Ý: N?u b?n nói Ti?ng Vi?t, có các d?ch v? h? tr? ngôn ng? mi?n phí dành cho b?n. G?i s? [phone].         [facility] [address] complies with applicable Federal civil rights laws and does not discriminate on the basis of race, color, national origin, age, disability, or sex.

## 2017-04-19 NOTE — LETTER
April 19, 2017      Manisha Wynne  [address]  [place] [address]             [facility] [address]  [address]  [address]  [facility] [address]  Phone: [phone] Patient: Manisha Wynne  MRN: [id number]  YOB: 1950  Date of Visit: 04/19/2017    To Whom It May Concern:    Manisha Wynne was seen in the Internal Medicine Clinic on 04/19/2017. She may return to work on 4/19/2017  with no restrictions. If you have any questions or concerns, or if I can be of further assistance, please do not hesitate to contact my office.    Sincerely,          Iris Blue MD  Section of Internal Medicine  [facility]

## 2017-04-21 ENCOUNTER — DOCUMENTATION ONLY (OUTPATIENT)
Dept: PSYCHIATRY | Facility: CLINIC | Age: 67
End: 2017-04-21

## 2017-04-21 ENCOUNTER — OFFICE VISIT (OUTPATIENT)
Dept: PSYCHIATRY | Facility: CLINIC | Age: 67
End: 2017-04-21
Payer: COMMERCIAL

## 2017-04-21 DIAGNOSIS — F98.8 ADD (ATTENTION DEFICIT DISORDER): ICD-10-CM

## 2017-04-21 DIAGNOSIS — F33.1 MAJOR DEPRESSIVE DISORDER, RECURRENT, MODERATE: Primary | ICD-10-CM

## 2017-04-21 DIAGNOSIS — F41.9 ANXIETY: ICD-10-CM

## 2017-04-21 PROCEDURE — 90834 PSYTX W PT 45 MINUTES: CPT | Mod: S$GLB,,, | Performed by: SOCIAL WORKER

## 2017-04-26 ENCOUNTER — HOSPITAL ENCOUNTER (OUTPATIENT)
Dept: RADIOLOGY | Facility: HOSPITAL | Age: 67
Discharge: HOME | End: 2017-04-26
Attending: INTERNAL MEDICINE
Payer: COMMERCIAL

## 2017-04-26 DIAGNOSIS — Z12.31 VISIT FOR SCREENING MAMMOGRAM: ICD-10-CM

## 2017-04-26 PROCEDURE — 77063 BREAST TOMOSYNTHESIS BI: CPT | Mod: 26,,, | Performed by: RADIOLOGY

## 2017-04-26 PROCEDURE — 77067 SCR MAMMO BI INCL CAD: CPT | Mod: TC

## 2017-04-26 PROCEDURE — 77067 SCR MAMMO BI INCL CAD: CPT | Mod: 26,,, | Performed by: RADIOLOGY

## 2017-04-28 ENCOUNTER — OFFICE VISIT (OUTPATIENT)
Dept: PSYCHIATRY | Facility: CLINIC | Age: 67
End: 2017-04-28
Payer: COMMERCIAL

## 2017-04-28 DIAGNOSIS — F33.1 MAJOR DEPRESSIVE DISORDER, RECURRENT, MODERATE: Primary | ICD-10-CM

## 2017-04-28 DIAGNOSIS — F41.9 ANXIETY: ICD-10-CM

## 2017-04-28 DIAGNOSIS — F98.8 ADD (ATTENTION DEFICIT DISORDER): ICD-10-CM

## 2017-04-28 PROCEDURE — 90834 PSYTX W PT 45 MINUTES: CPT | Mod: S$GLB,,, | Performed by: SOCIAL WORKER

## 2017-04-28 NOTE — PROGRESS NOTES
Individual Psychotherapy (PhD/LCSW)    4/28/2017    Site: [facility]    Therapeutic Intervention: Met with patient alone.  Outpatient - Insight oriented psychotherapy 45 min - CPT code 90664    Chief complaint/reason for encounter: depression, anxiety, distractibility     Interval history and content of current session: Pt on time, extremely [facility], since one paper that was to address 6 competencies addresses only 1. Pt has long pattern of avoidance and omission in her life, is tearful and panicky about how to deal with this event. Assist pt in [facility] to [facility] the problem before the committee spends time reviewing her paperwork. Pt remains anxious but is cooperative.    Treatment plan:  · Target symptoms: depression, anxiety, distractibility  · Why chosen therapy is appropriate versus another modality: relevant to diagnosis  · Outcome monitoring methods: self-report  · Therapeutic intervention type: insight oriented psychotherapy    Risk parameters:  Patient reports no suicidal ideation  Patient reports no homicidal ideation  Patient reports no self-injurious behavior  Patient reports no violent behavior    Verbal deficits: None    Patient's response to intervention:  The patient's response to intervention is motivated    Progress toward goals and other mental status changes:  The patient's progress toward goals is fair    Diagnosis: Major depression, recurrent moderate, anxiety,  ADD     Plan:  individual psychotherapy    Return to clinic: as scheduled

## 2017-04-30 ENCOUNTER — PATIENT MESSAGE (OUTPATIENT)
Dept: ADMINISTRATIVE | Facility: OTHER | Age: 67
End: 2017-04-30

## 2017-04-30 NOTE — PROGRESS NOTES
Subjective:       Patient ID: Manisha Wynne is a 66 y.o. female.    Chief Complaint: Follow-up    HPIPt tripped walking down the [place] - denies syncope or presyncope.  Still with mild HA no other sx.    Review of Systems   Respiratory: Negative for shortness of breath (PND or orthopnea).    Cardiovascular: Negative for chest pain (arm pain or jaw pain).   Gastrointestinal: Negative for abdominal pain, diarrhea, nausea and vomiting.   Genitourinary: Negative for dysuria.   Neurological: Positive for headaches. Negative for seizures and syncope.       Objective:      Physical Exam   Constitutional: She is oriented to person, place, and time. She appears well-developed and well-nourished. No distress.   HENT:   Head: Normocephalic.   Mouth/Throat: Oropharynx is clear and moist.   Neck: Neck supple. No JVD present. No thyromegaly present.   Cardiovascular: Normal rate, regular rhythm, normal heart sounds and intact distal pulses.  Exam reveals no gallop and no friction rub.    No murmur heard.  Pulmonary/Chest: Effort normal and breath sounds normal. She has no wheezes. She has no rales.   Abdominal: Soft. Bowel sounds are normal. She exhibits no distension and no mass. There is no tenderness. There is no rebound and no guarding.   Genitourinary: Rectal exam shows guaiac negative stool.   Musculoskeletal: She exhibits no edema.   Lymphadenopathy:     She has no cervical adenopathy.   Neurological: She is alert and oriented to person, place, and time. No cranial nerve deficit. Coordination normal.   Cannot do heel-to-toe easily    Skin: Skin is warm and dry.   Psychiatric: She has a normal mood and affect. Her behavior is normal. Judgment and thought content normal.       Assessment:       1. Impacted cerumen, unspecified laterality    2. Visit for screening mammogram    3. Nevus    4. Hypotension, unspecified hypotension type        Plan:   Impacted cerumen, unspecified laterality  -     Ambulatory consult to  ENT    Visit for screening mammogram  -    Mammo Digital Screening Bilat with CAD; Future; Expected date: 4/19/17    Nevus  -     Ambulatory consult to Dermatology    Hypotension, unspecified hypotension type  -     Hypertension Digital Medicine (HDMP)  Enrollment Order  -     Assign HDMP Onboarding Questionnaire Series  Will need to monitor. If low may need to decrease meds.

## 2017-05-12 ENCOUNTER — DOCUMENTATION ONLY (OUTPATIENT)
Dept: PSYCHIATRY | Facility: CLINIC | Age: 67
End: 2017-05-12

## 2017-05-12 NOTE — PROGRESS NOTES
Pt phones at 8:40 to say she overslept and missed her 8am appointment. Talk to pt about accountability to herself to prioritize attendance at her sessions.

## 2017-05-18 ENCOUNTER — PATIENT MESSAGE (OUTPATIENT)
Dept: ADMINISTRATIVE | Facility: OTHER | Age: 67
End: 2017-05-18

## 2017-05-26 ENCOUNTER — PATIENT OUTREACH (OUTPATIENT)
Dept: OTHER | Facility: OTHER | Age: 67
End: 2017-05-26
Payer: COMMERCIAL

## 2017-05-26 ENCOUNTER — OFFICE VISIT (OUTPATIENT)
Dept: PSYCHIATRY | Facility: CLINIC | Age: 67
End: 2017-05-26
Payer: COMMERCIAL

## 2017-05-26 DIAGNOSIS — F41.9 ANXIETY: ICD-10-CM

## 2017-05-26 DIAGNOSIS — F33.1 MAJOR DEPRESSIVE DISORDER, RECURRENT, MODERATE: Primary | ICD-10-CM

## 2017-05-26 DIAGNOSIS — F98.8 ADD (ATTENTION DEFICIT DISORDER): ICD-10-CM

## 2017-05-26 PROCEDURE — 90834 PSYTX W PT 45 MINUTES: CPT | Mod: S$GLB,,, | Performed by: SOCIAL WORKER

## 2017-05-26 NOTE — PROGRESS NOTES
"Last 5 Patient Entered Readings                                                               Current 30 Day Average: 142/71     Recent Readings 5/18/2017    Systolic BP (mmHg) 142    Diastolic BP (mmHg) 71    Pulse 64        Patient requests that I try her back tomorrow 5/30. She reports that she "never has a good time to talk".    Initial introduction completed with the pt and the role of the health  was explained.   We discussed the following information:  Exercise - Patient reports that she has a gym membership, but has not been able to use it in a while. She does do some walking around the hospital and takes the stairs when she can. Mrs. Wynne will be out of town until June 8. She plans on getting back into her regular exercise routine once she returns.  Diet - Patient reports that she does maintain a low sodium diet.     Resources on diet and exercise were sent.     Pt aware that I am not available for emergencies and to call 911 or [facility] on call if one arises.  Pt aware of the importance of medication adherence.  Pt aware of the importance of diet and exercise.  Pt aware that his sodium intake should be no more than 2000mg per day.  Pt aware that the recommended physical activity each week should be about 30 minutes per day at least 5 times per week.   Pt aware of the importance of taking BP readings at least weekly if not more and during different times each day.  Pt aware that the health  can be used as a resource for lifestyle modifications to help reduce or maintain a healthy BP       "

## 2017-05-26 NOTE — PROGRESS NOTES
Individual Psychotherapy (PhD/LCSW)    5/26/2017    Site: [facility]    Therapeutic Intervention: Met with patient alone.  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 Discuss anxiety, ways to prepare, offer support.    Treatment plan:  · Target symptoms: depression, anxiety, distractibility  · Why chosen therapy is appropriate versus another modality: relevant to diagnosis  · Outcome monitoring methods: self-report  · Therapeutic intervention type: insight oriented psychotherapy    Risk parameters:  Patient reports no suicidal ideation  Patient reports no homicidal ideation  Patient reports no self-injurious behavior  Patient reports no violent behavior    Verbal deficits: None    Patient's response to intervention:  The patient's response to intervention is motivated    Progress toward goals and other mental status changes:  The patient's progress toward goals is good    Diagnosis: Major depression, recurrent moderate, anxiety,  ADD     Plan:  individual psychotherapy    Return to clinic: as scheduled

## 2017-05-29 ENCOUNTER — DOCUMENTATION ONLY (OUTPATIENT)
Dept: REHABILITATION | Facility: OTHER | Age: 67
End: 2017-05-29
Attending: PHYSICAL MEDICINE & REHABILITATION
Payer: COMMERCIAL

## 2017-05-29 NOTE — PROGRESS NOTES
Health  Wellness Visit Note    Name: Manisha Wynne  Clinic Number: [id number]  Physician: No ref. provider found  Diagnosis: No diagnosis found.  Past Medical History:   Diagnosis Date    Abnormal Pap smear     Atrial fibrillation     AV block, 1st degree 7/25/2012    Depression 7/24/2012    Facet arthritis of lumbar region 3/31/2015    Hyperlipidemia     Hypertension 7/24/2012    Other specified cardiac dysrhythmias     Syncope 7/24/2012     Visit Number: 23  Precautions: Standard  Time In: 4:28 PM  Time Out: 5:28 PM  Total Treatment Time: 0 minutes    Subjective:   Patient reports that her lower back is pain free today; patient has not attended wellness in a few months; has not been consistent with her stretching but does utilize her lumbar roll while she is working; believes that she needs to bring it with her while she is at meetings as well; patient says the last time she had back pain was about a week ago-pain was abnormal because it was on the right side when she normally has pain on the left; feels that most of her pain is caused by lifting heavy things and attempting to carry things all at once (has trouble going up stairs)-reminded patient to use proper body mechanics.    Objective:   Manisha completed therapeutic stretches (EIS, FIS, ROBERT with ball) and the following MedX exercise machines: core lumbar, torso rotation l/r, leg extension, leg curl, upright row, chest press, biceps curl, triceps extension, leg press    See exercise log in patient folder for rate of exertion and repetitions completed.     Assessment:   Patient tolerated all exercises on the MedX equipment without any increase in symptoms; Iced lower back after completion of exercises.    Plan:  Continue with established plan of care towards wellness goals.     Health  : Perri Corley  5/29/2017

## 2017-06-02 ENCOUNTER — OFFICE VISIT (OUTPATIENT)
Dept: PSYCHIATRY | Facility: CLINIC | Age: 67
End: 2017-06-02
Payer: COMMERCIAL

## 2017-06-02 DIAGNOSIS — F33.1 MAJOR DEPRESSIVE DISORDER, RECURRENT, MODERATE: Primary | ICD-10-CM

## 2017-06-02 DIAGNOSIS — F41.9 ANXIETY: ICD-10-CM

## 2017-06-02 PROCEDURE — 90834 PSYTX W PT 45 MINUTES: CPT | Mod: S$GLB,,, | Performed by: SOCIAL WORKER

## 2017-06-02 NOTE — PROGRESS NOTES
"Individual Psychotherapy (PhD/LCSW)    6/2/2017    Site: [facility]    Therapeutic Intervention: Met with patient alone.  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 She is working on grounding skills, getting support from friends, carrying her "safety rock" as a talisman and finds it grounding. Suggest inviting anxiety to flow from her body into the rock and pt is very receptive. She went to meditation and found the verse offered was very on point, printed it for herself. Pt had forwarded to me the response from her review committee after reading her papers, reveals that she has not actually read it, out of fear. Have pt read the e-mail and she is relieved to find it warm and supportive. Continue inviting pt to feel worthy, capable, and to notice the caring people who surround her. Suggest focusing on relief when hearing is over.    Treatment plan:  · Target symptoms: depression, anxiety, distractibility  · Why chosen therapy is appropriate versus another modality: relevant to diagnosis  · Outcome monitoring methods: self-report  · Therapeutic intervention type: insight oriented psychotherapy    Risk parameters:  Patient reports no suicidal ideation  Patient reports no homicidal ideation  Patient reports no self-injurious behavior  Patient reports no violent behavior    Verbal deficits: None    Patient's response to intervention:  The patient's response to intervention is motivated    Progress toward goals and other mental status changes:  The patient's progress toward goals is good    Diagnosis: Major depression, recurrent moderate, anxiety,  ADD     Plan:  individual psychotherapy    Return to clinic: as scheduled  "

## 2017-06-06 ENCOUNTER — PATIENT OUTREACH (OUTPATIENT)
Dept: OTHER | Facility: OTHER | Age: 67
End: 2017-06-06

## 2017-06-06 NOTE — PROGRESS NOTES
Patient reports taking BP readings at work while she is rushing or stressed.   Continue monitoring- await additional readings.     Last 5 Patient Entered Redings Current 30 Day Average: 144/84     Recent Readings 7/25/2017 7/25/2017 7/24/2017 7/24/2017 7/19/2017    Systolic BP (mmHg) 154 154 150 150 136    Diastolic BP (mmHg) 96 96 92 92 78    Pulse 77 77 69 69 65

## 2017-06-08 ENCOUNTER — DOCUMENTATION ONLY (OUTPATIENT)
Dept: REHABILITATION | Facility: OTHER | Age: 67
End: 2017-06-08
Attending: PHYSICAL MEDICINE & REHABILITATION
Payer: COMMERCIAL

## 2017-06-08 NOTE — PROGRESS NOTES
Health  Wellness Visit Note    Name: Manisha Wynne  Clinic Number: [id number]  Physician: No ref. provider found  Diagnosis: No diagnosis found.  Past Medical History:   Diagnosis Date    Abnormal Pap smear     Atrial fibrillation     AV block, 1st degree 7/25/2012    Depression 7/24/2012    Facet arthritis of lumbar region 3/31/2015    Hyperlipidemia     Hypertension 7/24/2012    Other specified cardiac dysrhythmias     Syncope 7/24/2012     Visit Number: 24  Precautions: Standard  Time In: 4:36 PM  Time Out: 5:40 PM  Total Treatment Time:64 minutes    Subjective:   Patient reports that she had to brace herself from falling the past weekend; states that she was packing for a trip and she stepped on something which caused her to lose her balance; did not fall but her left foot is badly bruised; was walking on the inside of her foot and she now has a blister; noticed this morning that she had some pain in her right hip/lower back area-possibly due to her 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    Objective:   Manisha completed therapeutic stretches (EIS, FIS, ROBERT with ball) and the following MedX exercise machines: core lumbar, torso rotation l/r, leg extension, leg curl, upright row, chest press, biceps curl, triceps extension, leg press    See exercise log in patient folder for rate of exertion and repetitions completed.     Assessment:   Patient tolerated all exercises on the MedX equipment without any increase in symptoms; Iced lower back and right hip after completion of exercises.    Plan:  Continue with established plan of care towards wellness goals.     Health  : Perri MAX  Jory  6/8/2017

## 2017-06-09 ENCOUNTER — OFFICE VISIT (OUTPATIENT)
Dept: PSYCHIATRY | Facility: CLINIC | Age: 67
End: 2017-06-09
Payer: COMMERCIAL

## 2017-06-09 DIAGNOSIS — F33.41 MAJOR DEPRESSIVE DISORDER, RECURRENT EPISODE, IN PARTIAL REMISSION: Primary | ICD-10-CM

## 2017-06-09 DIAGNOSIS — F98.8 ADD (ATTENTION DEFICIT DISORDER): ICD-10-CM

## 2017-06-09 PROCEDURE — 90834 PSYTX W PT 45 MINUTES: CPT | Mod: S$GLB,,, | Performed by: SOCIAL WORKER

## 2017-06-09 NOTE — PROGRESS NOTES
"Individual Psychotherapy (PhD/LCSW)    6/9/2017    Site: [facility]    Therapeutic Intervention: Met with patient alone.  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 Invite pt to be aware of the love that surrounds her and the "help from the universe" that got her through this experience. Discuss relaxing and savoring her accomplishment.    Treatment plan:  · Target symptoms: depression, anxiety, distractibility  · Why chosen therapy is appropriate versus another modality: relevant to diagnosis  · Outcome monitoring methods: self-report  · Therapeutic intervention type: insight oriented psychotherapy    Risk parameters:  Patient reports no suicidal ideation  Patient reports no homicidal ideation  Patient reports no self-injurious behavior  Patient reports no violent behavior    Verbal deficits: None    Patient's response to intervention:  The patient's response to intervention is motivated    Progress toward goals and other mental status changes:  The patient's progress toward goals is excellent    Diagnosis: Major depression, recurrent in partial remission, anxiety,  ADD     Plan:  individual psychotherapy    Return to clinic: as scheduled  "

## 2017-06-13 ENCOUNTER — OFFICE VISIT (OUTPATIENT)
Dept: INTERNAL MEDICINE | Facility: CLINIC | Age: 67
End: 2017-06-13
Payer: COMMERCIAL

## 2017-06-13 ENCOUNTER — HOSPITAL ENCOUNTER (OUTPATIENT)
Dept: RADIOLOGY | Facility: HOSPITAL | Age: 67
Discharge: HOME | End: 2017-06-13
Attending: INTERNAL MEDICINE
Payer: COMMERCIAL

## 2017-06-13 ENCOUNTER — PATIENT MESSAGE (OUTPATIENT)
Dept: INTERNAL MEDICINE | Facility: CLINIC | Age: 67
End: 2017-06-13

## 2017-06-13 ENCOUNTER — TELEPHONE (OUTPATIENT)
Dept: INTERNAL MEDICINE | Facility: CLINIC | Age: 67
End: 2017-06-13

## 2017-06-13 VITALS
HEART RATE: 66 BPM | WEIGHT: 186.94 LBS | DIASTOLIC BLOOD PRESSURE: 70 MMHG | HEIGHT: 68 IN | SYSTOLIC BLOOD PRESSURE: 112 MMHG | OXYGEN SATURATION: 99 % | TEMPERATURE: 100 F | BODY MASS INDEX: 28.33 KG/M2

## 2017-06-13 DIAGNOSIS — M79.672 FOOT PAIN, LEFT: Primary | ICD-10-CM

## 2017-06-13 DIAGNOSIS — M79.672 FOOT PAIN, LEFT: ICD-10-CM

## 2017-06-13 DIAGNOSIS — S92.355A CLOSED NONDISPLACED FRACTURE OF FIFTH METATARSAL BONE OF LEFT FOOT, INITIAL ENCOUNTER: Primary | ICD-10-CM

## 2017-06-13 PROCEDURE — 99213 OFFICE O/P EST LOW 20 MIN: CPT | Mod: S$GLB,,, | Performed by: INTERNAL MEDICINE

## 2017-06-13 PROCEDURE — 99999 PR PBB SHADOW E&M-EST. PATIENT-LVL III: CPT | Mod: PBBFAC,,, | Performed by: INTERNAL MEDICINE

## 2017-06-13 PROCEDURE — 1157F ADVNC CARE PLAN IN RCRD: CPT | Mod: S$GLB,,, | Performed by: INTERNAL MEDICINE

## 2017-06-13 PROCEDURE — 73630 X-RAY EXAM OF FOOT: CPT | Mod: TC,LT

## 2017-06-13 PROCEDURE — 73630 X-RAY EXAM OF FOOT: CPT | Mod: 26,LT,, | Performed by: RADIOLOGY

## 2017-06-13 PROCEDURE — 1159F MED LIST DOCD IN RCRD: CPT | Mod: S$GLB,,, | Performed by: INTERNAL MEDICINE

## 2017-06-13 PROCEDURE — 1125F AMNT PAIN NOTED PAIN PRSNT: CPT | Mod: S$GLB,,, | Performed by: INTERNAL MEDICINE

## 2017-06-13 NOTE — PROGRESS NOTES
Subjective:       Patient ID: Manisha Wynne is a 66 y.o. female.    Chief Complaint: Leg Pain (left leg x 10 days 7/10 otc ice )    HPI   Brittany 3 walking, foot rolled and had immediate pain and swelling.  She may have stepped on a book.   Applied ice.  Swelling persists as does pain.  Unable to wear regula shoes.  May have tried aleve and tylenol, but not regularly.    Review of Systems   Constitutional: Negative for activity change and unexpected weight change.   HENT: Negative for hearing loss, rhinorrhea and trouble swallowing.    Eyes: Negative for discharge and visual disturbance.   Respiratory: Negative for chest tightness and wheezing.    Cardiovascular: Negative for chest pain and palpitations.   Gastrointestinal: Negative for blood in stool, constipation, diarrhea and vomiting.   Endocrine: Negative for polydipsia and polyuria.   Genitourinary: Negative for difficulty urinating, dysuria, hematuria and menstrual problem.   Musculoskeletal: Negative for arthralgias, joint swelling and neck pain.   Neurological: Negative for weakness and headaches.   Psychiatric/Behavioral: Negative for confusion and dysphoric mood.       Objective:      Physical Exam   Constitutional: She appears well-developed and well-nourished. She appears distressed.   Skin:   Mild bruising distal does on L.. Tenderness plantar and dorsal surfaces mid metarsals with mild edema or dorsal surface.  Ankle renu.   Psychiatric: She has a normal mood and affect. Her behavior is normal.       Assessment:       1. Foot pain, left        Plan:       Manisha was seen today for leg pain.    Diagnoses and all orders for this visit:    Foot pain, left  -     X-Ray Foot Complete Left; Future       Addendum - xray shows 5th metatarsal fracture - ortho referral placed.

## 2017-06-14 ENCOUNTER — DOCUMENTATION ONLY (OUTPATIENT)
Dept: REHABILITATION | Facility: OTHER | Age: 67
End: 2017-06-14
Attending: PHYSICAL MEDICINE & REHABILITATION
Payer: COMMERCIAL

## 2017-06-14 NOTE — PROGRESS NOTES
Health  Wellness Visit Note    Name: Manisha Wynne  Clinic Number: [id number]  Physician: No ref. provider found  Diagnosis: No diagnosis found.  Past Medical History:   Diagnosis Date    Abnormal Pap smear     Atrial fibrillation     AV block, 1st degree 7/25/2012    Depression 7/24/2012    Facet arthritis of lumbar region 3/31/2015    Hyperlipidemia     Hypertension 7/24/2012    Other specified cardiac dysrhythmias     Syncope 7/24/2012     Visit Number: 25  Precautions: Standard  Time In: 4:48 PM  Time Out: 5:37 PM  Total Treatment Time: 49 minutes    Subjective:   Patient reports that her lower back is feeling fine today; not having any issues; states that she is still having some discomfort in her right oblique area; says the pain/discomfort is not as intense as it was last week but improvement is slow; patient had an X-ray completed of her foot and was diagnosed with a fracture of the 5th metatarsal; has an appointment with her orthopedic doctor tomorrow.    Objective:   Manisha completed therapeutic stretches (EIS, FIS, ROBERT with ball) and the following MedX exercise machines: core lumbar, torso rotation l/r, leg extension, leg curl, upright row, chest press, biceps curl, triceps extension, leg press    See exercise log in patient folder for rate of exertion and repetitions completed.     Assessment:   Patient tolerated all exercises (except for the leg press) on the MedX equipment without any increase in symptoms; skipped warm up; Iced lower back after completion of exercises.    Plan:  Continue with established plan of care towards wellness goals. Patient completed her year of care within the program; will transition to [facility] via [facility]erral and will also meet with  in July for equipment set up.    Health  : Perri Corley  6/14/2017

## 2017-06-15 ENCOUNTER — OFFICE VISIT (OUTPATIENT)
Dept: ORTHOPEDICS | Facility: CLINIC | Age: 67
End: 2017-06-15
Payer: COMMERCIAL

## 2017-06-15 VITALS — HEIGHT: 69 IN | WEIGHT: 186.38 LBS | BODY MASS INDEX: 27.6 KG/M2

## 2017-06-15 DIAGNOSIS — S92.355A CLOSED NONDISPLACED FRACTURE OF FIFTH METATARSAL BONE OF LEFT FOOT, INITIAL ENCOUNTER: Primary | ICD-10-CM

## 2017-06-15 DIAGNOSIS — M54.5 LOW BACK PAIN, UNSPECIFIED BACK PAIN LATERALITY, UNSPECIFIED CHRONICITY, WITH SCIATICA PRESENCE UNSPECIFIED: Primary | ICD-10-CM

## 2017-06-15 PROCEDURE — 99999 PR PBB SHADOW E&M-EST. PATIENT-LVL II: CPT | Mod: PBBFAC,,, | Performed by: ORTHOPAEDIC SURGERY

## 2017-06-15 PROCEDURE — 1159F MED LIST DOCD IN RCRD: CPT | Mod: S$GLB,,, | Performed by: ORTHOPAEDIC SURGERY

## 2017-06-15 PROCEDURE — 99203 OFFICE O/P NEW LOW 30 MIN: CPT | Mod: 25,S$GLB,, | Performed by: ORTHOPAEDIC SURGERY

## 2017-06-15 PROCEDURE — 28470 CLTX METATARSAL FX WO MNP EA: CPT | Mod: LT,S$GLB,, | Performed by: ORTHOPAEDIC SURGERY

## 2017-06-15 PROCEDURE — 1157F ADVNC CARE PLAN IN RCRD: CPT | Mod: S$GLB,,, | Performed by: ORTHOPAEDIC SURGERY

## 2017-06-15 PROCEDURE — 1125F AMNT PAIN NOTED PAIN PRSNT: CPT | Mod: S$GLB,,, | Performed by: ORTHOPAEDIC SURGERY

## 2017-06-15 NOTE — LETTER
Brittany 15, 2017      Marissa Vera MD  [address]  [place] [address]           [facility]  [address]  [place] [address]  Phone: [phone]          Patient: Manisha Wynne   MR Number: [id number]   YOB: 1950   Date of Visit: 6/15/2017       Dear Dr. Marissa Vera:    Thank you for referring Manisha Wynne to me for evaluation. Attached you will find relevant portions of my assessment and plan of care.    If you have questions, please do not hesitate to call me. I look forward to following Manisha Wynne along with you.    Sincerely,    Felix Gamez MD    Enclosure  CC:  No Recipients    If you would like to receive this communication electronically, please contact [email or web address] or [phone] to request more information on [facility] access.    For providers and/or their staff who would like to refer a patient to [facility], please contact us through our one-stop-shop provider referral line, [facility], at [phone].    If you feel you have received this communication in error or would no longer like to receive these types of communications, please e-mail [email or web address]

## 2017-06-15 NOTE — PROGRESS NOTES
DATE: 6/15/2017  PATIENT: Manisha Wynne    CHIEF COMPLAINT: left foot pain    HISTORY:  Manisha Wynne is a 66 y.o. female  here for initial evaluation of left foot pain associated with known metatarsal fracture. The pain has been present for 12 days. There is a history of trauma; rolled her foot while accidentally stepping on a book. The patient describes the pain as sharp.  The pain is worse with ambulation and improved by rest. There is mild associated numbness and tingling.  Prior treatments have included none.      PAST MEDICAL/SURGICAL HISTORY:  Past Medical History:   Diagnosis Date    Abnormal Pap smear     Atrial fibrillation     AV block, 1st degree 7/25/2012    Depression 7/24/2012    Facet arthritis of lumbar region 3/31/2015    Hyperlipidemia     Hypertension 7/24/2012    Other specified cardiac dysrhythmias     Syncope 7/24/2012     Past Surgical History:   Procedure Laterality Date    CARDIAC PACEMAKER PLACEMENT  9/12    DILATION AND CURETTAGE OF UTERUS      Hx of precancerous cells?    TONSILLECTOMY         Current Medications:   Current Outpatient Prescriptions:     aspirin (ECOTRIN) 81 MG EC tablet, Take 81 mg by mouth. 1 Tablet, Delayed Release (E.C.) Oral Every day, Disp: , Rfl:     buPROPion (WELLBUTRIN XL) 300 MG 24 hr tablet, Take 300 mg by mouth once daily., Disp: , Rfl:     calcium-vitamin D 600 mg(1,500mg) -200 unit Tab, Take 1 tablet by mouth once daily. 2 Tablets Oral Every day, Disp: , Rfl:     chlorhexidine (PERIDEX) 0.12 % solution, Use as directed 15 mLs in the mouth or throat 2 (two) times daily. , Disp: , Rfl:     dicyclomine (BENTYL) 20 mg tablet, Take 1 tablet (20 mg total) by mouth 3 (three) times daily as needed. (Patient taking differently: Take 20 mg by mouth 3 (three) times daily as needed. Pt taking prn.), Disp: 14 tablet, Rfl: 0    duloxetine (CYMBALTA) 30 MG capsule, Take 30 mg by mouth once daily. , Disp: , Rfl:     duloxetine  (CYMBALTA) 60 MG capsule, 1 Capsule, Delayed Release(E.C.) Oral Every day, Disp: 30 capsule, Rfl: 3    lactulose (CHRONULAC) 20 gram/30 mL Soln, Take 15 mLs (10 g total) by mouth 2 (two) times daily as needed (Constipation)., Disp: 150 mL, Rfl: 0    lisdexamfetamine (VYVANSE) 70 MG capsule, Take 70 mg by mouth every morning., Disp: , Rfl:     lisinopril (PRINIVIL,ZESTRIL) 20 MG tablet, Take 1 tablet (20 mg total) by mouth once daily., Disp: 90 tablet, Rfl: 3    metoprolol tartrate (LOPRESSOR) 25 MG tablet, Take 1 tablet (25 mg total) by mouth 2 (two) times daily., Disp: 180 tablet, Rfl: 3    multivitamin-minerals-lutein (CENTRUM SILVER) Tab, Take by mouth. 1 Tablet Oral Every day, Disp: , Rfl:     ondansetron (ZOFRAN) 4 MG tablet, Take 1 tablet (4 mg total) by mouth every 6 (six) hours as needed for Nausea., Disp: 10 tablet, Rfl: 0    pravastatin (PRAVACHOL) 10 MG tablet, Take 1 tablet (10 mg total) by mouth once daily., Disp: 90 tablet, Rfl: 3    vitamin C-biotin (HAIR-SKIN-NAILS, VIT C-BIOTIN,) 50 mg -1,250 mcg Chew, Take by mouth once daily., Disp: , Rfl:     Social History:   Social History     Social History    Marital status: Single     Spouse name: N/A    Number of children: N/A    Years of education: N/A     Occupational History    [employer]     Social History Main Topics    Smoking status: Former Smoker     Packs/day: 0.25     Years: 15.00     Quit date: 7/25/1982    Smokeless tobacco: Never Used    Alcohol use Yes      Comment: occ    Drug use: No    Sexual activity: Not Currently     Other Topics Concern    Not on file     Social History Narrative    No narrative on file       REVIEW OF SYSTEMS:  Constitution: Negative. Negative for chills, fever and night sweats.   Cardiovascular: Negative for chest pain and syncope.   Respiratory: Negative for cough and shortness of breath.   Gastrointestinal: See HPI. Negative for nausea/vomiting. Negative for abdominal  "pain.  Genitourinary: See HPI. Negative for discoloration or dysuria.  Skin: Negative for dry skin, itching and rash.   Hematologic/Lymphatic: Negative for bleeding problem. Does not bruise/bleed easily.   Musculoskeletal: Negative for falls and muscle weakness.   Neurological: See HPI. No seizures.   Endocrine: Negative for polydipsia, polyphagia and polyuria.   Allergic/Immunologic: Negative for hives and persistent infections.    PHYSICAL EXAMINATION:    Ht 5' 8.5" (1.74 m)   Wt 84.5 kg (186 lb 6.4 oz)   LMP 10/01/2003   BMI 27.93 kg/m²     General: The patient is a  66 y.o. female in no apparent distress, the patient is orientatied to person, place and time.   Psych: Normal mood and affect  HEENT:  NCAT  Lungs:  Respirations are equal and unlabored.  CV:  2+ bilateral upper and lower extremity pulses.  Skin:  Intact throughout.    MSK:    LLE:  - skin intact, mild brusing and swelling; tender 5th met  - AROM and PROM intact.  - TA/EHL/Gastroc/FHL assessed in isolation without deficit  - SILT throughout  - DP and PT palpated  2+  - Capillary Refill <3s        IMAGING:     Radiographs of the left foot were ordered and personally reviewed with the patient today.  They show 5th metatarsal fracture; mildly displaced.    ASSESSMENT/PLAN:    Manisha was seen today for left foot injury.    Diagnoses and all orders for this visit:    Closed nondisplaced fracture of fifth metatarsal bone of left foot, initial encounter      No Follow-up on file.    Left 5th metatarsal fracture.  Will place in walking boot and RTC in 4 weeks.    I have personally taken the history and examined this patient and agree with the residents note as stated above.  Recommend closed treatment of left 5th metatarsal fracture with boot immobilization and protected weightbearing.  "

## 2017-06-30 ENCOUNTER — OFFICE VISIT (OUTPATIENT)
Dept: PSYCHIATRY | Facility: CLINIC | Age: 67
End: 2017-06-30
Payer: COMMERCIAL

## 2017-06-30 DIAGNOSIS — F33.41 MAJOR DEPRESSIVE DISORDER, RECURRENT EPISODE, IN PARTIAL REMISSION: Primary | ICD-10-CM

## 2017-06-30 DIAGNOSIS — F98.8 ADD (ATTENTION DEFICIT DISORDER): ICD-10-CM

## 2017-06-30 PROCEDURE — 90834 PSYTX W PT 45 MINUTES: CPT | Mod: S$GLB,,, | Performed by: SOCIAL WORKER

## 2017-06-30 NOTE — PROGRESS NOTES
"Individual Psychotherapy (PhD/LCSW)    6/30/2017    Site: [facility]    Therapeutic Intervention: Met with patient alone.  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 She is feeling empowered to get things done more efficiently and effectively, is trying things she has avoided at work and at home. She has a "de-cluttering lady" coming this weekend to help her and is enjoying getting rid of things. Main issue is poor time management. Discuss possible strategies.    Treatment plan:  · Target symptoms: depression, anxiety, distractibility  · Why chosen therapy is appropriate versus another modality: relevant to diagnosis  · Outcome monitoring methods: self-report  · Therapeutic intervention type: insight oriented psychotherapy    Risk parameters:  Patient reports no suicidal ideation  Patient reports no homicidal ideation  Patient reports no self-injurious behavior  Patient reports no violent behavior    Verbal deficits: None    Patient's response to intervention:  The patient's response to intervention is motivated    Progress toward goals and other mental status changes:  The patient's progress toward goals is very good    Diagnosis: Major depression, recurrent in partial remission, anxiety,  ADD     Plan:  individual psychotherapy    Return to clinic: as scheduled  "

## 2017-07-06 ENCOUNTER — PATIENT MESSAGE (OUTPATIENT)
Dept: OTHER | Facility: OTHER | Age: 67
End: 2017-07-06

## 2017-07-11 ENCOUNTER — TELEPHONE (OUTPATIENT)
Dept: ELECTROPHYSIOLOGY | Facility: CLINIC | Age: 67
End: 2017-07-11

## 2017-07-11 DIAGNOSIS — I48.91 ATRIAL FIBRILLATION, UNSPECIFIED TYPE: Primary | ICD-10-CM

## 2017-07-11 DIAGNOSIS — Z95.0 CARDIAC PACEMAKER IN SITU: Primary | ICD-10-CM

## 2017-07-11 DIAGNOSIS — I48.91 ATRIAL FIBRILLATION, UNSPECIFIED TYPE: ICD-10-CM

## 2017-07-13 ENCOUNTER — OFFICE VISIT (OUTPATIENT)
Dept: ELECTROPHYSIOLOGY | Facility: CLINIC | Age: 67
End: 2017-07-13
Payer: COMMERCIAL

## 2017-07-13 ENCOUNTER — HOSPITAL ENCOUNTER (OUTPATIENT)
Dept: CARDIOLOGY | Facility: CLINIC | Age: 67
Discharge: HOME | End: 2017-07-13
Payer: COMMERCIAL

## 2017-07-13 ENCOUNTER — CLINICAL SUPPORT (OUTPATIENT)
Dept: ELECTROPHYSIOLOGY | Facility: CLINIC | Age: 67
End: 2017-07-13
Payer: COMMERCIAL

## 2017-07-13 ENCOUNTER — HOSPITAL ENCOUNTER (OUTPATIENT)
Dept: RADIOLOGY | Facility: HOSPITAL | Age: 67
Discharge: HOME | End: 2017-07-13
Attending: ORTHOPAEDIC SURGERY
Payer: COMMERCIAL

## 2017-07-13 ENCOUNTER — OFFICE VISIT (OUTPATIENT)
Dept: ORTHOPEDICS | Facility: CLINIC | Age: 67
End: 2017-07-13
Payer: COMMERCIAL

## 2017-07-13 VITALS
HEIGHT: 68 IN | SYSTOLIC BLOOD PRESSURE: 110 MMHG | HEART RATE: 67 BPM | DIASTOLIC BLOOD PRESSURE: 68 MMHG | WEIGHT: 187.81 LBS | BODY MASS INDEX: 28.46 KG/M2

## 2017-07-13 DIAGNOSIS — I49.5 SSS (SICK SINUS SYNDROME): Primary | Chronic | ICD-10-CM

## 2017-07-13 DIAGNOSIS — I44.0 AV BLOCK, 1ST DEGREE: Chronic | ICD-10-CM

## 2017-07-13 DIAGNOSIS — I10 ESSENTIAL HYPERTENSION: Chronic | ICD-10-CM

## 2017-07-13 DIAGNOSIS — G47.33 OSA (OBSTRUCTIVE SLEEP APNEA): ICD-10-CM

## 2017-07-13 DIAGNOSIS — Z95.0 CARDIAC PACEMAKER IN SITU: ICD-10-CM

## 2017-07-13 DIAGNOSIS — E78.2 MIXED HYPERLIPIDEMIA: Chronic | ICD-10-CM

## 2017-07-13 DIAGNOSIS — S92.355A CLOSED NONDISPLACED FRACTURE OF FIFTH METATARSAL BONE OF LEFT FOOT, INITIAL ENCOUNTER: ICD-10-CM

## 2017-07-13 DIAGNOSIS — S92.355D CLOSED NONDISPLACED FRACTURE OF FIFTH METATARSAL BONE OF LEFT FOOT WITH ROUTINE HEALING, SUBSEQUENT ENCOUNTER: Primary | ICD-10-CM

## 2017-07-13 DIAGNOSIS — I48.91 ATRIAL FIBRILLATION, UNSPECIFIED TYPE: ICD-10-CM

## 2017-07-13 PROCEDURE — 93280 PM DEVICE PROGR EVAL DUAL: CPT | Mod: S$GLB,,, | Performed by: INTERNAL MEDICINE

## 2017-07-13 PROCEDURE — 73630 X-RAY EXAM OF FOOT: CPT | Mod: 26,LT,, | Performed by: RADIOLOGY

## 2017-07-13 PROCEDURE — 93000 ELECTROCARDIOGRAM COMPLETE: CPT | Mod: S$GLB,,, | Performed by: INTERNAL MEDICINE

## 2017-07-13 PROCEDURE — 99999 PR PBB SHADOW E&M-EST. PATIENT-LVL III: CPT | Mod: PBBFAC,,, | Performed by: NURSE PRACTITIONER

## 2017-07-13 PROCEDURE — 99214 OFFICE O/P EST MOD 30 MIN: CPT | Mod: S$GLB,,, | Performed by: NURSE PRACTITIONER

## 2017-07-13 PROCEDURE — 73630 X-RAY EXAM OF FOOT: CPT | Mod: TC,LT

## 2017-07-13 PROCEDURE — 1157F ADVNC CARE PLAN IN RCRD: CPT | Mod: S$GLB,,, | Performed by: NURSE PRACTITIONER

## 2017-07-13 PROCEDURE — 99999 PR PBB SHADOW E&M-EST. PATIENT-LVL I: CPT | Mod: PBBFAC,,, | Performed by: ORTHOPAEDIC SURGERY

## 2017-07-13 PROCEDURE — 99024 POSTOP FOLLOW-UP VISIT: CPT | Mod: S$GLB,,, | Performed by: ORTHOPAEDIC SURGERY

## 2017-07-13 PROCEDURE — 1159F MED LIST DOCD IN RCRD: CPT | Mod: S$GLB,,, | Performed by: NURSE PRACTITIONER

## 2017-07-13 NOTE — PROGRESS NOTES
"Subjective:    Patient ID:  Manisha Wynne is a 66 y.o. female who presents for follow-up for a Pacemaker Check.     Manisha Wynne is a patient of Dr. Xenia Ford.    HPI     Ms. Wynne is a 66 y.o. female with SSS, first degree AVB s/p PPM, HLD, HTN, and CEDRIC. Syncopal episodes resolved post PPM implantation. Ms. Wynne reports that every few months she experiences a sharp "shock" sensation in her chest, lasting 1-2 seconds without associated SOB or radiation; long-standing. Asymptomatic today in clinic; she denies SOB/SHEPARD, dizziness, palpitations, or syncope. She reports that she is doing well, and that she remains active w/work without issue. No current CV Sx/c/os    Recent cardiac studies:  Stress Echo (10/14/13): The EKG portion of this study was negative for ischemia at a moderate workload, and peak heart rate of 126 bpm (85% of predicted). The Echo portion of the study revealed an EF of 60-65%, mild LAE, trivial-to-mild AR,MR, and TR.   Device Interrogation (07/13/17) reveals an intrinsic SB with stable lead and device function. AMS x 1 (1 hour  48 minute duration; egram c/w AT) NSVT x 1 (6 second duration) noted. He paces 69% in the RA and 0% in the RV. Estimated battery longevity 6 years.     Positional dizziness (long term; when changing position from lying to sitting. Fell about a month ago hurt both wrists; went to PT, pain continues to follow up with     I reviewed today's ECG which demonstrated an A-paced rhythm at 67 bpm; , , and QTc 429.     Review of Systems   Constitution: Negative for diaphoresis and malaise/fatigue.   HENT: Negative for headaches and nosebleeds.    Eyes: Negative for double vision.   Cardiovascular: Negative for dyspnea on exertion, irregular heartbeat, near-syncope, palpitations and syncope.   Respiratory: Negative for shortness of breath.    Skin: Negative.    Musculoskeletal: Negative.    Gastrointestinal: Negative for abdominal pain, " hematemesis and hematochezia.   Genitourinary: Negative for hematuria.   Neurological: Negative for dizziness and light-headedness.   Psychiatric/Behavioral: Negative for altered mental status.        Objective:    Physical Exam   Constitutional: She is oriented to person, place, and time. She appears well-developed and well-nourished.   HENT:   Head: Normocephalic and atraumatic.   Eyes: Pupils are equal, round, and reactive to light.   Cardiovascular: Normal rate, regular rhythm, normal heart sounds and intact distal pulses.    Pulmonary/Chest: Effort normal and breath sounds normal.   Neurological: She is alert and oriented to person, place, and time.   Skin: She is not diaphoretic.   Vitals reviewed.        Assessment:       1. SSS (sick sinus syndrome)    2. AV block, 1st degree    3. Cardiac pacemaker in situ    4. Mixed hyperlipidemia    5. Essential hypertension    6. CEDRIC (obstructive sleep apnea)         Plan:       In summary, Ms. Wynne is a 66 y.o. female with SSS, first degree AVB s/p PPM, HLD, HTN, and CEDRIC. Ms. Wynne is doing well from a rhythm perspective with stable lead and device function with 1 episode of AT noted; no ventricular arrhythmia noted.     Continue current medication regimen and device settings.   Follow up in device clinic as scheduled.   Follow up in EP clinic in 1 year, sooner as needed.     ESTHELA Orta, APRN, FNP-C      Case discussed in detail with Dr. Xenia Ford who agrees with the aforementioned plan.

## 2017-07-13 NOTE — PROGRESS NOTES
Ms. Wynne returns today for follow-up of her left fifth metatarsal fracture.  She's been in a fracture boot for the last 4 weeks.  She reports that her pain is improved.  Examination today reveals minimal swelling but some continued tenderness over the fracture site.  I ordered reviewed an x-ray of her left foot today and there is new bone formation at the fracture site.  Plan: I would allow Ms. Wynne to wean out of her boot as tolerated this point.             I will have her make a return appointment in 4 weeks she should have repeat x-rays of her left foot at that time.

## 2017-07-17 ENCOUNTER — PATIENT OUTREACH (OUTPATIENT)
Dept: OTHER | Facility: OTHER | Age: 67
End: 2017-07-17

## 2017-07-17 NOTE — PROGRESS NOTES
"Last 5 Patient Entered Redings Current 30 Day Average: 144/84     Recent Readings 7/25/2017 7/25/2017 7/24/2017 7/24/2017 7/19/2017    Systolic BP (mmHg) 154 154 150 150 136    Diastolic BP (mmHg) 96 96 92 92 78    Pulse 77 77 69 69 65          Follow up with Ms. Manisha Wynne completed. Ms. Wynne is concerned about elevated Bp. Patient reports that her PCP recommended this program to her because of low readings in the office. Patient is concerned because now readings are high. However, Ms. Wynne reports stress at work (where she is taking readings) and not giving herself enough time to relax before taking the reading and is often distracted. She also expressed that she is anxious at times because she is worried that the BP will be elevated. We reviewed the proper technique for taking a BP reading. Patient reports that she is monitoring her sodium intake, but at times "can't avoid it". Patient reports that she never adds salt to anything. Patient also does a lot of walking around the hospital. Patient did not have any further questions or concerns. I will follow up in a few weeks to see how she is doing and progressing.          "

## 2017-08-04 ENCOUNTER — OFFICE VISIT (OUTPATIENT)
Dept: OPTOMETRY | Facility: CLINIC | Age: 67
End: 2017-08-04
Payer: COMMERCIAL

## 2017-08-04 DIAGNOSIS — H52.4 PRESBYOPIA: ICD-10-CM

## 2017-08-04 DIAGNOSIS — Z01.00 EXAMINATION OF EYES AND VISION: Primary | ICD-10-CM

## 2017-08-04 DIAGNOSIS — H25.13 NUCLEAR SCLEROSIS, BILATERAL: ICD-10-CM

## 2017-08-04 PROCEDURE — 92015 DETERMINE REFRACTIVE STATE: CPT | Mod: S$GLB,,, | Performed by: OPTOMETRIST

## 2017-08-04 PROCEDURE — 92014 COMPRE OPH EXAM EST PT 1/>: CPT | Mod: S$GLB,,, | Performed by: OPTOMETRIST

## 2017-08-04 PROCEDURE — 99999 PR PBB SHADOW E&M-EST. PATIENT-LVL II: CPT | Mod: PBBFAC,,, | Performed by: OPTOMETRIST
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
## 2017-08-08 ENCOUNTER — OFFICE VISIT (OUTPATIENT)
Dept: PSYCHIATRY | Facility: CLINIC | Age: 67
End: 2017-08-08
Payer: COMMERCIAL

## 2017-08-08 DIAGNOSIS — F41.9 ANXIETY: ICD-10-CM

## 2017-08-08 DIAGNOSIS — F33.41 MAJOR DEPRESSIVE DISORDER, RECURRENT EPISODE, IN PARTIAL REMISSION: Primary | ICD-10-CM

## 2017-08-08 DIAGNOSIS — F98.8 ATTENTION DEFICIT DISORDER, UNSPECIFIED HYPERACTIVITY PRESENCE: ICD-10-CM

## 2017-08-08 PROCEDURE — 90834 PSYTX W PT 45 MINUTES: CPT | Mod: S$GLB,,, | Performed by: SOCIAL WORKER

## 2017-08-08 NOTE — PROGRESS NOTES
Individual Psychotherapy (PhD/LCSW)    8/8/2017    Site: [facility]    Therapeutic Intervention: Met with patient alone.  Outpatient - Insight oriented psychotherapy 45 min - CPT code 77650    Chief complaint/reason for encounter: depression, anxiety, distractibility     Interval history and content of current session: Pt reports recent experience of holding back from sarcasm and speaking out during work event, felt good about increased control. She served herself dinner in a nice way and enjoyed this. Main issue is coming late to work. Discuss resistance to authority and control vs. Intentional behavior on her own behalf, which would increase her control.    Treatment plan:  · Target symptoms: depression, anxiety, distractibility  · Why chosen therapy is appropriate versus another modality: relevant to diagnosis  · Outcome monitoring methods: self-report  · Therapeutic intervention type: insight oriented psychotherapy    Risk parameters:  Patient reports no suicidal ideation  Patient reports no homicidal ideation  Patient reports no self-injurious behavior  Patient reports no violent behavior    Verbal deficits: None    Patient's response to intervention:  The patient's response to intervention is motivated    Progress toward goals and other mental status changes:  The patient's progress toward goals is good    Diagnosis: Major depression, recurrent in partial remission, anxiety,  ADD     Plan:  individual psychotherapy    Return to clinic: as scheduled

## 2017-08-10 ENCOUNTER — HOSPITAL ENCOUNTER (OUTPATIENT)
Dept: RADIOLOGY | Facility: HOSPITAL | Age: 67
Discharge: HOME | End: 2017-08-10
Attending: ORTHOPAEDIC SURGERY
Payer: COMMERCIAL

## 2017-08-10 ENCOUNTER — OFFICE VISIT (OUTPATIENT)
Dept: ORTHOPEDICS | Facility: CLINIC | Age: 67
End: 2017-08-10
Payer: COMMERCIAL

## 2017-08-10 VITALS — BODY MASS INDEX: 27.64 KG/M2 | WEIGHT: 182.38 LBS | HEIGHT: 68 IN

## 2017-08-10 DIAGNOSIS — S92.355D CLOSED NONDISPLACED FRACTURE OF FIFTH METATARSAL BONE OF LEFT FOOT WITH ROUTINE HEALING, SUBSEQUENT ENCOUNTER: ICD-10-CM

## 2017-08-10 DIAGNOSIS — S92.355D CLOSED NONDISPLACED FRACTURE OF FIFTH METATARSAL BONE OF LEFT FOOT WITH ROUTINE HEALING, SUBSEQUENT ENCOUNTER: Primary | ICD-10-CM

## 2017-08-10 PROCEDURE — 99024 POSTOP FOLLOW-UP VISIT: CPT | Mod: S$GLB,,, | Performed by: ORTHOPAEDIC SURGERY

## 2017-08-10 PROCEDURE — 99999 PR PBB SHADOW E&M-EST. PATIENT-LVL II: CPT | Mod: PBBFAC,,, | Performed by: ORTHOPAEDIC SURGERY

## 2017-08-10 PROCEDURE — 73630 X-RAY EXAM OF FOOT: CPT | Mod: TC,LT

## 2017-08-10 PROCEDURE — 73630 X-RAY EXAM OF FOOT: CPT | Mod: 26,LT,, | Performed by: RADIOLOGY

## 2017-08-10 NOTE — PROGRESS NOTES
Ms. Wynne returns today for follow-up of her left fifth metatarsal fracture.  It's been 8 weeks since she injured her left foot.  She reports that her symptoms are improved.  She is actually having more discomfort in the central portion of her forefoot.  She has been able to walk out of the boot at home without any difficulty.  She continues to use the boot while at work more for protection.    Examination: There is still some mild swelling.  There is essentially no tenderness at the fracture site.  She has some mild tenderness centrally in the ball of her foot.    X-ray: A new x-ray was ordered and there is continued new bone formation at the fracture site.    Recommendation: Ms. Page can continue to progress her weightbearing and activity out of the boot as tolerated.    I will have her make a return appointment in 6-8 weeks if her pain does not continue to improve.  If she returns she should have a repeat x-ray of her left foot.

## 2017-08-11 ENCOUNTER — PATIENT OUTREACH (OUTPATIENT)
Dept: OTHER | Facility: OTHER | Age: 67
End: 2017-08-11

## 2017-08-11 NOTE — PROGRESS NOTES
Called patient to review readings. LVM.   Consider lisinopril dose increase.    Last 5 Patient Entered Redings Current 30 Day Average: 142/85     Recent Readings 8/10/2017 8/10/2017 8/8/2017 8/8/2017 8/7/2017    Systolic BP (mmHg) 154 154 125 125 144    Diastolic BP (mmHg) 92 92 72 72 84    Pulse 80 80 76 76 78

## 2017-08-16 ENCOUNTER — OFFICE VISIT (OUTPATIENT)
Dept: SLEEP MEDICINE | Facility: CLINIC | Age: 67
End: 2017-08-16
Payer: COMMERCIAL

## 2017-08-16 VITALS
WEIGHT: 186.75 LBS | DIASTOLIC BLOOD PRESSURE: 72 MMHG | SYSTOLIC BLOOD PRESSURE: 134 MMHG | BODY MASS INDEX: 28.3 KG/M2 | HEART RATE: 71 BPM | HEIGHT: 68 IN

## 2017-08-16 DIAGNOSIS — G47.33 OBSTRUCTIVE SLEEP APNEA: Primary | ICD-10-CM

## 2017-08-16 PROCEDURE — 99213 OFFICE O/P EST LOW 20 MIN: CPT | Mod: S$GLB,,, | Performed by: NURSE PRACTITIONER

## 2017-08-16 PROCEDURE — 99999 PR PBB SHADOW E&M-EST. PATIENT-LVL IV: CPT | Mod: PBBFAC,,, | Performed by: NURSE PRACTITIONER

## 2017-08-16 PROCEDURE — 3075F SYST BP GE 130 - 139MM HG: CPT | Mod: S$GLB,,, | Performed by: NURSE PRACTITIONER

## 2017-08-16 PROCEDURE — 3008F BODY MASS INDEX DOCD: CPT | Mod: S$GLB,,, | Performed by: NURSE PRACTITIONER

## 2017-08-16 PROCEDURE — 1157F ADVNC CARE PLAN IN RCRD: CPT | Mod: S$GLB,,, | Performed by: NURSE PRACTITIONER

## 2017-08-16 PROCEDURE — 1159F MED LIST DOCD IN RCRD: CPT | Mod: S$GLB,,, | Performed by: NURSE PRACTITIONER

## 2017-08-16 PROCEDURE — 1126F AMNT PAIN NOTED NONE PRSNT: CPT | Mod: S$GLB,,, | Performed by: NURSE PRACTITIONER

## 2017-08-16 PROCEDURE — 3078F DIAST BP <80 MM HG: CPT | Mod: S$GLB,,, | Performed by: NURSE PRACTITIONER

## 2017-08-16 NOTE — PROGRESS NOTES
This 66 y.o. female patient returns today  for the management of obstructive sleep apnea, Last seen 12/29/16 8/11/16:  Patient is 26 minutes late for her scheduled follow up appointment.    CPAP titration 6/7/16: Improved with CPAP at 8-12 cm, but an effective pressure not conclusively determined. Oral leaks were noted. Wt 188 lbs    Patient has been set up on CPAP.  She reports having trouble with the full face mask. She is trying her second mask  She likes to read before bedtime and her current mask does not allow her to wear glasses, and she falls asleep without CPAP on.    ESS = 7    CPAP interrogation: set 8-12 cm [facility]: 103 treatment hours; 6/30 days >4 hours; predicted AHI 12.3, 90% tile 11.9    She denies nasal stuffiness.    PRIOR SLEEP HISTORY:    Patient reports consistent difficulty getting to work on time. As a result of work up, a sleep study was ordered. It was positive for obstructive sleep apnea. She was directed here for further care.    Sleep symptoms for past 5-6 years:  Difficulty falling asleep at night, at times, particularly if worry. This can delay sleep onset by 2 hours.  Wakes once for bathroom.  Sometimes cramps in her legs.  Alarms go off at 6:15-6:30 am  Greatest difficulty is waking up in the morning. Even if she gets up on time, she may sit on the bed and accidentally fall back asleep. This has resulted in tardiness to work.    Prior tendency to be a night owl, but lately difficulty staying up late.    ESS = 11    Seen by Dr. Blue on 3/17/16, who ordered sleep study.      INTERVAL HISTORY:  9/22/16: She continues to use apap 8-13cm. She likes new Rox view mask so she can read before bedtime. Gets to bed late at times due to evening work (alt day shifts). May fall asleep on couch sometimes but applies mask when up few hour later. Feels better. Sleeping better. ESS=8    Interrogation: AHI 12.3, 0-1% periodic. 90% tile 10.5-12.6cm. 100% mask fit. avg use 2.7h/n.  "9/30d>4h.     10/27/16:  Easier to wake up, more refreshed up awakening. Sleeping longer with mask on now. Not falling asleep on couch when home w/o mask on anymore. Feels tired though when home from work. Got much better sleep this weekend 7h/night. Continues to liike her FFM. Always uses ramp. Likes higher humidity. ESS=5    Interrogation: 90% tile 13.8-15.5. AHI 9.4-10.4, 18/30d>4h. Avd 30d 4.1h/n 100% mask fit. 0% periodic    12/29/16: She is sleeping more and using mask more since last seen. Traveling with machine. Her work schedule is better now. Using ramp. Doing better and likes using mask. Feels better.     Interrogation- 7d avg 4.3h/n. 12/30d>4h. 99 %mask fit. AHI 4.2    8/16/17:   Continues to use but not consistently."on and off". Feels and sleeps better when she does use it. When out of town she uses it more (ie:[employer] trip). +ramp use. Often falls asleep on couch watching tv. ESS=9  Interrogation- AHI 3.7-6.1, 90% tile 12-15, 100% mask fit, AHI 0.6, 2/30d>4h.         BASELINE PSG 3/22/16: +CEDRIC, AHI 27.8, low sat 88%, wt 188 lbs  Titration study- 6//16: CPAP was explored from 4 to 16 cm of water using a small Eson and a Nuance nasal pillows mask, chin strap, CFlex at 3, and heated humidification. Initial improvement was noted at 8 cm, but higher pressures were needed due to breakthrough events. While supine, mouth leaks were common, despite chinstrap. Residual breakthrough events did not improve about 12 cm and control of CEDRIC was worsened at higher pressures along with frequent oral leaks. REM stage sleep was not seen, so an effective pressure could not be conclusively determined.     She is a  at 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    SLEEP ROUTINE:   Bed partner: none   Time to bed: 10:30-11:30 pm   Sleep onset latency: sometimes 2 hours   Disruptions or awakenings: once for bathroom   Wakeup time: 6:15-6:30 am   Perceived sleep quality: fair   Daytime naps: none    Past Medical History: " "  Diagnosis Date    Abnormal Pap smear     Atrial fibrillation     AV block, 1st degree 7/25/2012    Cardiac dysrhythmias NEC     Cataract     Depression 7/24/2012    Facet arthritis of lumbar region 3/31/2015    Hyperlipidemia     Hypertension 7/24/2012    Syncope 7/24/2012       REVIEW OF SYSTEMS:  Sleep related symptoms as per HPI; wgt stable, otherwise a balance review of 10-systems is negative.       PHYSICAL EXAM:  /72   Pulse 71   Ht 5' 8" (1.727 m)   Wt 84.7 kg (186 lb 11.7 oz)   LMP 10/01/2003   BMI 28.39 kg/m²   GENERAL: Well groomed; Normally developed;      ASSESSMENT:    1. Obstructive Sleep Apnea, moderate by AHI. The patient symptomatically has sleep disruption and difficulty waking in the morning, likely due to sleep inertia and inadequate sleep quality. She has just started on CPAP, and attempting to acclimate to it. She is tolerating the pressure, but having difficulty with her mask fit. 9/22, since got new FFM much better acclimation/ability to fall asleep with mask, she is adherent but work schedule affects usage.  Symptoms improved with use. She is granted additional 30d to improve use/meet ins use guidelines. Current 85d use compliance if 26% 10/27: She is doing better, sleeping longer with mask on, applying mask early when home now. Persistent mild residual ahi. Feels good using therapy. 12/29: Adherent with therapy, doing much better, feels good and likes using PAP therapy. AHI <5 indicating effective therapy with 4+hr/night use. 8/16/17: Adherent but very limited due to not putting mask on/falling asleep w/o mask. Uses more faithfully when not home/out of town. Motivated to continue using therapy. Continues to feel better with use.     2. Insomnia, adjustment, during times of stress.    She has medical co-morbidities of paroxysmal atrial fibrillation           PLAN:  1. Continue nightly use, avoid sleep w/o mask on. Continue APAP 11-18cm. Continue to strive 7h/night. " Ramp OK to use set 4cm/20min    Education: During our discussion today, we talked about the etiology of obstructive sleep apnea as well as the potential ramifications of untreated sleep apnea, which could include daytime sleepiness, hypertension, heart disease and/or stroke.      Do not drive sleepy. CONSIDER nasal mask    RTC 6-mos adherence monitoring, sooner if needed

## 2017-08-21 ENCOUNTER — OFFICE VISIT (OUTPATIENT)
Dept: PSYCHIATRY | Facility: CLINIC | Age: 67
End: 2017-08-21
Payer: COMMERCIAL

## 2017-08-21 DIAGNOSIS — F98.8 ATTENTION DEFICIT DISORDER, UNSPECIFIED HYPERACTIVITY PRESENCE: ICD-10-CM

## 2017-08-21 DIAGNOSIS — F41.9 ANXIETY: ICD-10-CM

## 2017-08-21 DIAGNOSIS — F33.41 MAJOR DEPRESSIVE DISORDER, RECURRENT EPISODE, IN PARTIAL REMISSION: Primary | ICD-10-CM

## 2017-08-21 PROCEDURE — 90834 PSYTX W PT 45 MINUTES: CPT | Mod: S$GLB,,, | Performed by: SOCIAL WORKER

## 2017-08-21 NOTE — PROGRESS NOTES
"Individual Psychotherapy (PhD/LCSW)    8/21/2017    Site: [facility]    Therapeutic Intervention: Met with patient alone.  Outpatient - Insight oriented psychotherapy 45 min - CPT code 01957    Chief complaint/reason for encounter: depression, anxiety, distractibility     Interval history and content of current session: Pt c/o exhaustion after working 6 day week. She will try to use her C-PAP consistently to see if this helps with stamina. Discuss concerns about aging, finances and her future. Discuss presenting solutions along with problems at work. Pt feeling "more like an adult who has something to contribute" in her work.    Treatment plan:  · Target symptoms: depression, anxiety, distractibility  · Why chosen therapy is appropriate versus another modality: relevant to diagnosis  · Outcome monitoring methods: self-report  · Therapeutic intervention type: insight oriented psychotherapy    Risk parameters:  Patient reports no suicidal ideation  Patient reports no homicidal ideation  Patient reports no self-injurious behavior  Patient reports no violent behavior    Verbal deficits: None    Patient's response to intervention:  The patient's response to intervention is motivated    Progress toward goals and other mental status changes:  The patient's progress toward goals is good    Diagnosis: Major depression, recurrent in partial remission, anxiety,  ADD     Plan:  individual psychotherapy    Return to clinic: as scheduled  "

## 2017-08-24 ENCOUNTER — PATIENT OUTREACH (OUTPATIENT)
Dept: OTHER | Facility: OTHER | Age: 67
End: 2017-08-24

## 2017-08-24 NOTE — PROGRESS NOTES
Ms. Wynne continues to monitor BP readings. She is feeling a bit less anxious about monitoring. She has been resting more before monitoring. She does not believe BP is high. She was originally enrolled in [facility] because BP was on LLN in office. She does not want to make medications changes until cardiology visit in December.     Last 5 Patient Entered Redings Current 30 Day Average: 141/83     Recent Readings 8/23/2017 8/23/2017 8/22/2017 8/22/2017 8/10/2017    Systolic BP (mmHg) 148 148 135 135 154    Diastolic BP (mmHg) 90 90 72 72 92    Pulse 73 73 62 62 80        BP close to  Goal  Continue monitoring    Hypertension Medications             lisinopril (PRINIVIL,ZESTRIL) 20 MG tablet Take 1 tablet (20 mg total) by mouth once daily.    metoprolol tartrate (LOPRESSOR) 25 MG tablet Take 1 tablet (25 mg total) by mouth 2 (two) times daily.

## 2017-08-28 ENCOUNTER — OFFICE VISIT (OUTPATIENT)
Dept: PSYCHIATRY | Facility: CLINIC | Age: 67
End: 2017-08-28
Payer: COMMERCIAL

## 2017-08-28 DIAGNOSIS — F41.9 ANXIETY: ICD-10-CM

## 2017-08-28 DIAGNOSIS — F98.8 ATTENTION DEFICIT DISORDER, UNSPECIFIED HYPERACTIVITY PRESENCE: ICD-10-CM

## 2017-08-28 DIAGNOSIS — F33.41 MAJOR DEPRESSIVE DISORDER, RECURRENT EPISODE, IN PARTIAL REMISSION: Primary | ICD-10-CM

## 2017-08-28 PROCEDURE — 90834 PSYTX W PT 45 MINUTES: CPT | Mod: S$GLB,,, | Performed by: SOCIAL WORKER

## 2017-08-28 NOTE — PROGRESS NOTES
Individual Psychotherapy (PhD/LCSW)    8/28/2017    Site: [facility]    Therapeutic Intervention: Met with patient alone.  Outpatient - Insight oriented psychotherapy 45 min - CPT code 79272    Chief complaint/reason for encounter: depression, anxiety, distractibility     Interval history and content of current session: Pt in reflective frame of mind, doing some meditation in preparation for High Holy Days, with a stated goal of complaining less. Discuss her pride in getting credentialed as a . Discuss having realistic goals for change and being more accepting of herself and her life.     Treatment plan:  · Target symptoms: depression, anxiety, distractibility  · Why chosen therapy is appropriate versus another modality: relevant to diagnosis  · Outcome monitoring methods: self-report  · Therapeutic intervention type: insight oriented psychotherapy    Risk parameters:  Patient reports no suicidal ideation  Patient reports no homicidal ideation  Patient reports no self-injurious behavior  Patient reports no violent behavior    Verbal deficits: None    Patient's response to intervention:  The patient's response to intervention is motivated    Progress toward goals and other mental status changes:  The patient's progress toward goals is good    Diagnosis: Major depression, recurrent in partial remission, anxiety,  ADD     Plan:  individual psychotherapy    Return to clinic: as scheduled

## 2017-08-29 ENCOUNTER — LAB VISIT (OUTPATIENT)
Dept: LAB | Facility: HOSPITAL | Age: 67
End: 2017-08-29
Attending: INTERNAL MEDICINE
Payer: COMMERCIAL

## 2017-08-29 ENCOUNTER — OFFICE VISIT (OUTPATIENT)
Dept: INTERNAL MEDICINE | Facility: CLINIC | Age: 67
End: 2017-08-29
Payer: COMMERCIAL

## 2017-08-29 VITALS
BODY MASS INDEX: 28.3 KG/M2 | HEIGHT: 68 IN | SYSTOLIC BLOOD PRESSURE: 108 MMHG | WEIGHT: 186.75 LBS | HEART RATE: 75 BPM | DIASTOLIC BLOOD PRESSURE: 74 MMHG

## 2017-08-29 DIAGNOSIS — E78.2 MIXED HYPERLIPIDEMIA: Chronic | ICD-10-CM

## 2017-08-29 DIAGNOSIS — R73.9 HYPERGLYCEMIA: ICD-10-CM

## 2017-08-29 DIAGNOSIS — Z95.0 CARDIAC PACEMAKER IN SITU: ICD-10-CM

## 2017-08-29 DIAGNOSIS — R87.619 ABNORMAL CERVICAL PAPANICOLAOU SMEAR, UNSPECIFIED ABNORMAL PAP FINDING: Primary | ICD-10-CM

## 2017-08-29 DIAGNOSIS — D22.9 NEVUS: ICD-10-CM

## 2017-08-29 DIAGNOSIS — Z86.39 HISTORY OF VITAMIN D DEFICIENCY: ICD-10-CM

## 2017-08-29 DIAGNOSIS — D64.9 ANEMIA, UNSPECIFIED TYPE: ICD-10-CM

## 2017-08-29 DIAGNOSIS — I10 ESSENTIAL HYPERTENSION: Chronic | ICD-10-CM

## 2017-08-29 DIAGNOSIS — F32.A DEPRESSION, UNSPECIFIED DEPRESSION TYPE: ICD-10-CM

## 2017-08-29 DIAGNOSIS — M85.80 OSTEOPENIA, UNSPECIFIED LOCATION: ICD-10-CM

## 2017-08-29 LAB
25(OH)D3+25(OH)D2 SERPL-MCNC: 46 NG/ML
ALBUMIN SERPL BCP-MCNC: 3.9 G/DL
ALP SERPL-CCNC: 85 U/L
ALT SERPL W/O P-5'-P-CCNC: 32 U/L
ANION GAP SERPL CALC-SCNC: 8 MMOL/L
AST SERPL-CCNC: 27 U/L
BASOPHILS # BLD AUTO: 0.03 K/UL
BASOPHILS NFR BLD: 0.4 %
BILIRUB SERPL-MCNC: 0.7 MG/DL
BUN SERPL-MCNC: 23 MG/DL
CALCIUM SERPL-MCNC: 9.8 MG/DL
CHLORIDE SERPL-SCNC: 103 MMOL/L
CO2 SERPL-SCNC: 28 MMOL/L
CREAT SERPL-MCNC: 1.1 MG/DL
CRP SERPL-MCNC: 0.7 MG/L
DIFFERENTIAL METHOD: ABNORMAL
EOSINOPHIL # BLD AUTO: 0.2 K/UL
EOSINOPHIL NFR BLD: 2.9 %
ERYTHROCYTE [DISTWIDTH] IN BLOOD BY AUTOMATED COUNT: 13.1 %
ERYTHROCYTE [SEDIMENTATION RATE] IN BLOOD BY WESTERGREN METHOD: 17 MM/HR
EST. GFR  (AFRICAN AMERICAN): 59.6 ML/MIN/1.73 M^2
EST. GFR  (NON AFRICAN AMERICAN): 51.7 ML/MIN/1.73 M^2
GLUCOSE SERPL-MCNC: 95 MG/DL
HCT VFR BLD AUTO: 34.5 %
HGB BLD-MCNC: 11.8 G/DL
LYMPHOCYTES # BLD AUTO: 1.9 K/UL
LYMPHOCYTES NFR BLD: 26.3 %
MCH RBC QN AUTO: 30.2 PG
MCHC RBC AUTO-ENTMCNC: 34.2 G/DL
MCV RBC AUTO: 88 FL
MONOCYTES # BLD AUTO: 0.9 K/UL
MONOCYTES NFR BLD: 12.2 %
NEUTROPHILS # BLD AUTO: 4.2 K/UL
NEUTROPHILS NFR BLD: 57.9 %
PLATELET # BLD AUTO: 183 K/UL
PMV BLD AUTO: 10.8 FL
POTASSIUM SERPL-SCNC: 4.2 MMOL/L
PROT SERPL-MCNC: 7.3 G/DL
RBC # BLD AUTO: 3.91 M/UL
SODIUM SERPL-SCNC: 139 MMOL/L
WBC # BLD AUTO: 7.31 K/UL

## 2017-08-29 PROCEDURE — 99999 PR PBB SHADOW E&M-EST. PATIENT-LVL V: CPT | Mod: PBBFAC,,, | Performed by: INTERNAL MEDICINE

## 2017-08-29 PROCEDURE — 3078F DIAST BP <80 MM HG: CPT | Mod: S$GLB,,, | Performed by: INTERNAL MEDICINE

## 2017-08-29 PROCEDURE — 3008F BODY MASS INDEX DOCD: CPT | Mod: S$GLB,,, | Performed by: INTERNAL MEDICINE

## 2017-08-29 PROCEDURE — 99214 OFFICE O/P EST MOD 30 MIN: CPT | Mod: S$GLB,,, | Performed by: INTERNAL MEDICINE

## 2017-08-29 PROCEDURE — 80053 COMPREHEN METABOLIC PANEL: CPT

## 2017-08-29 PROCEDURE — 1157F ADVNC CARE PLAN IN RCRD: CPT | Mod: S$GLB,,, | Performed by: INTERNAL MEDICINE

## 2017-08-29 PROCEDURE — 1159F MED LIST DOCD IN RCRD: CPT | Mod: S$GLB,,, | Performed by: INTERNAL MEDICINE

## 2017-08-29 PROCEDURE — 3074F SYST BP LT 130 MM HG: CPT | Mod: S$GLB,,, | Performed by: INTERNAL MEDICINE

## 2017-08-29 PROCEDURE — 83036 HEMOGLOBIN GLYCOSYLATED A1C: CPT

## 2017-08-29 PROCEDURE — 1126F AMNT PAIN NOTED NONE PRSNT: CPT | Mod: S$GLB,,, | Performed by: INTERNAL MEDICINE

## 2017-08-29 PROCEDURE — 85651 RBC SED RATE NONAUTOMATED: CPT

## 2017-08-29 PROCEDURE — 86140 C-REACTIVE PROTEIN: CPT

## 2017-08-29 PROCEDURE — 82306 VITAMIN D 25 HYDROXY: CPT

## 2017-08-29 PROCEDURE — 36415 COLL VENOUS BLD VENIPUNCTURE: CPT | Mod: PO

## 2017-08-29 PROCEDURE — 85025 COMPLETE CBC W/AUTO DIFF WBC: CPT

## 2017-08-30 ENCOUNTER — PATIENT MESSAGE (OUTPATIENT)
Dept: INTERNAL MEDICINE | Facility: CLINIC | Age: 67
End: 2017-08-30

## 2017-08-30 LAB
ESTIMATED AVG GLUCOSE: 108 MG/DL
HBA1C MFR BLD HPLC: 5.4 %

## 2017-08-31 ENCOUNTER — OUTPATIENT CASE MANAGEMENT (OUTPATIENT)
Dept: ADMINISTRATIVE | Facility: OTHER | Age: 67
End: 2017-08-31

## 2017-08-31 NOTE — PROGRESS NOTES
The following patient has been assigned to Mariya Andrews RN with Outpatient [facility] for high risk screening.    Reason: High Risk    Please contact OPCM at [phone] with any questions.    Thank you,  Pari Barney

## 2017-09-01 ENCOUNTER — PATIENT OUTREACH (OUTPATIENT)
Dept: OTHER | Facility: OTHER | Age: 67
End: 2017-09-01

## 2017-09-01 NOTE — PROGRESS NOTES
Last 5 Patient Entered Redings Current 30 Day Average: 141/80     Recent Readings 9/13/2017 9/13/2017 8/25/2017 8/25/2017 8/23/2017    Systolic BP (mmHg) 119 119 162 162 148    Diastolic BP (mmHg) 73 73 84 84 90    Pulse 63 63 70 70 73        Hypertension Digital Medicine (HDMP) Health  Follow Up    LVM to follow up with Mercedes Manisha Swathi Wynne. 3rd attempt.    Per 30 day average, blood pressure is well controlled 141/80 mmHg. Encouraged adherence to low sodium diet and physical activity guidelines. Advised patient to call or message with questions or concerns. WCB in 3-4 weeks.

## 2017-09-05 ENCOUNTER — OUTPATIENT CASE MANAGEMENT (OUTPATIENT)
Dept: ADMINISTRATIVE | Facility: OTHER | Age: 67
End: 2017-09-05

## 2017-09-05 NOTE — PROGRESS NOTES
"Patient referred for high risk OPCM. Spoke with patient, informed of referral to OPCM and program. Patient declines need for OPCM as she is compliant with appointments, medications, treatment plan, etc. Patient wants to know how her name was selected for high risk OPCM. Informed patient that 2 ED visits in 12 months or 2 hospitalizations plus 10 medications are some of the criteria. Patient has 2 ED visits, unrelated one in April 2017 and one in December 2016 and has ten medications listed. Patient would like to speak with manager regarding referral and she believes they are so many non-compliant patients that should be target that do not take medications and or do not follow with MD's etc. Patient has her chart "locked" to not have other people get in her record and is very concerned that people are in her chart that should not be. Offered patient to have OPCM Manager Kyra Soriano call her to explain process. Patient in agreement to have manager Kyra Soriano RN call to discuss. States she will keep her mobile phone in her hands to discuss. Informed patient that case will be closed.     Discussed case with Kyra Soriano RN OPCM Manager. States she has a conference call and will call patient in one hour. Few minutes later, received phone call from Jessica Gant RN Supervisor stating that Manager Kyra Soriano asked her to reach out know to patient. Informed Jessica Gant of patient's concerns. States patient name can be removed from registry. States will call patient immediately.   "

## 2017-09-05 NOTE — PROGRESS NOTES
Subjective:       Patient ID: Manisha Wynne is a 66 y.o. female.    Chief Complaint: Follow-up    HPIPt doing well overall except that she fractured her metatarsal and it is still healing.  Otherwise no syncope, mood okay.  Review of Systems   Respiratory: Negative for shortness of breath (PND or orthopnea).    Cardiovascular: Negative for chest pain (arm pain or jaw pain).   Gastrointestinal: Negative for abdominal pain, diarrhea, nausea and vomiting.   Genitourinary: Negative for dysuria.   Neurological: Negative for seizures, syncope and headaches.       Objective:      Physical Exam   Constitutional: She is oriented to person, place, and time. She appears well-developed and well-nourished. No distress.   HENT:   Head: Normocephalic.   Mouth/Throat: Oropharynx is clear and moist.   Neck: Neck supple. No JVD present. No thyromegaly present.   Cardiovascular: Normal rate, regular rhythm, normal heart sounds and intact distal pulses.  Exam reveals no gallop and no friction rub.    No murmur heard.  Pulmonary/Chest: Effort normal and breath sounds normal. She has no wheezes. She has no rales.   Abdominal: Soft. Bowel sounds are normal. She exhibits no distension and no mass. There is no tenderness. There is no rebound and no guarding.   Musculoskeletal: She exhibits no edema.   Lymphadenopathy:     She has no cervical adenopathy.   Neurological: She is alert and oriented to person, place, and time. She has normal reflexes.   Skin: Skin is warm and dry.   Psychiatric: She has a normal mood and affect. Her behavior is normal. Judgment and thought content normal.       Assessment:       1. Abnormal cervical Papanicolaou smear, unspecified abnormal pap finding    2. Cardiac pacemaker in situ    3. Mixed hyperlipidemia    4. Essential hypertension    5. Depression, unspecified depression type    6. Hyperglycemia    7. History of vitamin D deficiency    8. Anemia, unspecified type    9. Nevus    10. Osteopenia,  unspecified location        Plan:   Abnormal cervical Papanicolaou smear, unspecified abnormal pap finding  -     Ambulatory consult to Gynecology    Cardiac pacemaker in situ  Keeps close cardiology follow up  Mixed hyperlipidemia  Controlled - continue current meds    Essential hypertension  -     CBC auto differential; Future; Expected date: 08/29/2017  -     Comprehensive metabolic panel; Future; Expected date: 08/29/2017  Higher on digital med - advised checking BP at home and not at work  Depression, unspecified depression type  Controlled - continue current meds    Hyperglycemia  -     Hemoglobin A1c; Future; Expected date: 08/29/2017    History of vitamin D deficiency  -     Vitamin D; Future; Expected date: 08/29/2017    Anemia, unspecified type  -     Sedimentation rate, manual; Future; Expected date: 08/29/2017  -     C-reactive protein; Future; Expected date: 08/29/2017  Stable - had recent hematology eval  Nevus  -     Ambulatory consult to Dermatology    Osteopenia, unspecified location  -     DXA Bone Density Spine And Hip; Future; Expected date: 08/29/2017

## 2017-09-06 ENCOUNTER — TELEPHONE (OUTPATIENT)
Dept: INTERNAL MEDICINE | Facility: CLINIC | Age: 67
End: 2017-09-06

## 2017-09-06 NOTE — TELEPHONE ENCOUNTER
----- Message from Mariya Andrews RN sent at 9/6/2017 10:16 AM CDT -----  Dr Blue,  I wanted to let you know that patient was called yesterday to offer OPCM as she was deemed high risk for OPCM. Patient was very upset as her chart / glass was broken and declined services.   Sincerely,  Mariya

## 2017-09-07 ENCOUNTER — PATIENT MESSAGE (OUTPATIENT)
Dept: INTERNAL MEDICINE | Facility: CLINIC | Age: 67
End: 2017-09-07

## 2017-09-15 ENCOUNTER — TELEPHONE (OUTPATIENT)
Dept: CARDIOLOGY | Facility: CLINIC | Age: 67
End: 2017-09-15

## 2017-09-15 ENCOUNTER — LAB VISIT (OUTPATIENT)
Dept: LAB | Facility: HOSPITAL | Age: 67
End: 2017-09-15
Attending: INTERNAL MEDICINE
Payer: COMMERCIAL

## 2017-09-15 DIAGNOSIS — I10 ESSENTIAL HYPERTENSION: Chronic | ICD-10-CM

## 2017-09-15 DIAGNOSIS — E78.2 MIXED HYPERLIPIDEMIA: Chronic | ICD-10-CM

## 2017-09-15 LAB
ALBUMIN SERPL BCP-MCNC: 3.6 G/DL
ALP SERPL-CCNC: 95 U/L
ALT SERPL W/O P-5'-P-CCNC: 58 U/L
ANION GAP SERPL CALC-SCNC: 6 MMOL/L
AST SERPL-CCNC: 45 U/L
BILIRUB SERPL-MCNC: 0.5 MG/DL
BUN SERPL-MCNC: 23 MG/DL
CALCIUM SERPL-MCNC: 9.7 MG/DL
CHLORIDE SERPL-SCNC: 109 MMOL/L
CHOLEST SERPL-MCNC: 199 MG/DL
CHOLEST/HDLC SERPL: 3.1 {RATIO}
CO2 SERPL-SCNC: 27 MMOL/L
CREAT SERPL-MCNC: 1.1 MG/DL
EST. GFR  (AFRICAN AMERICAN): 59.2 ML/MIN/1.73 M^2
EST. GFR  (NON AFRICAN AMERICAN): 51.3 ML/MIN/1.73 M^2
GLUCOSE SERPL-MCNC: 110 MG/DL
HDLC SERPL-MCNC: 65 MG/DL
HDLC SERPL: 32.7 %
LDLC SERPL CALC-MCNC: 118.6 MG/DL
NONHDLC SERPL-MCNC: 134 MG/DL
POTASSIUM SERPL-SCNC: 4.4 MMOL/L
PROT SERPL-MCNC: 7 G/DL
SODIUM SERPL-SCNC: 142 MMOL/L
TRIGL SERPL-MCNC: 77 MG/DL
TSH SERPL DL<=0.005 MIU/L-ACNC: 3.01 UIU/ML

## 2017-09-15 PROCEDURE — 80061 LIPID PANEL: CPT

## 2017-09-15 PROCEDURE — 80053 COMPREHEN METABOLIC PANEL: CPT

## 2017-09-15 PROCEDURE — 36415 COLL VENOUS BLD VENIPUNCTURE: CPT

## 2017-09-15 PROCEDURE — 84443 ASSAY THYROID STIM HORMONE: CPT

## 2017-09-15 NOTE — TELEPHONE ENCOUNTER
----- Message from Natali Comer MD sent at 9/15/2017  2:28 PM CDT -----  Mrs Wynne,  Please review results of your blood work.  Lipids are fine, but your liver tests are mildly elevated. You may wish to contact your PCP to address these abnormalities. I do not think they are related to statin therapy.

## 2017-09-18 ENCOUNTER — OFFICE VISIT (OUTPATIENT)
Dept: PSYCHIATRY | Facility: CLINIC | Age: 67
End: 2017-09-18
Payer: COMMERCIAL

## 2017-09-18 DIAGNOSIS — F98.8 ATTENTION DEFICIT DISORDER, UNSPECIFIED HYPERACTIVITY PRESENCE: ICD-10-CM

## 2017-09-18 DIAGNOSIS — F33.41 MAJOR DEPRESSIVE DISORDER, RECURRENT EPISODE, IN PARTIAL REMISSION: Primary | ICD-10-CM

## 2017-09-18 DIAGNOSIS — F41.9 ANXIETY: ICD-10-CM

## 2017-09-18 PROCEDURE — 90834 PSYTX W PT 45 MINUTES: CPT | Mod: S$GLB,,, | Performed by: SOCIAL WORKER

## 2017-09-18 NOTE — PROGRESS NOTES
"Individual Psychotherapy (PhD/LCSW)    9/18/2017    Site: [facility]    Therapeutic Intervention: Met with patient alone.  Outpatient - Insight oriented psychotherapy 45 min - CPT code 63073    Chief complaint/reason for encounter: depression, anxiety, distractibility     Interval history and content of current session: Pt received an excellent evaluation, feels perhaps for the first time in her life that she is appreciated and valued and good at what she does, "I feel like a real ."  Work with pt to fully take this in, notice how it changes her view of herself and her place in the world. Pt just had birthday, prepares to visit friends for [place]. She shares a fear of allowing herself to be happy. Agree we will work on this.    Treatment plan:  · Target symptoms: depression, anxiety, distractibility  · Why chosen therapy is appropriate versus another modality: relevant to diagnosis  · Outcome monitoring methods: self-report  · Therapeutic intervention type: insight oriented psychotherapy    Risk parameters:  Patient reports no suicidal ideation  Patient reports no homicidal ideation  Patient reports no self-injurious behavior  Patient reports no violent behavior    Verbal deficits: None    Patient's response to intervention:  The patient's response to intervention is motivated    Progress toward goals and other mental status changes:  The patient's progress toward goals is good    Diagnosis: Major depression, recurrent in partial remission, anxiety,  ADD     Plan:  individual psychotherapy    Return to clinic: as scheduled  "

## 2017-09-25 ENCOUNTER — OFFICE VISIT (OUTPATIENT)
Dept: PSYCHIATRY | Facility: CLINIC | Age: 67
End: 2017-09-25
Payer: COMMERCIAL

## 2017-09-25 DIAGNOSIS — F98.8 ATTENTION DEFICIT DISORDER, UNSPECIFIED HYPERACTIVITY PRESENCE: ICD-10-CM

## 2017-09-25 DIAGNOSIS — F33.41 MAJOR DEPRESSIVE DISORDER, RECURRENT EPISODE, IN PARTIAL REMISSION: Primary | ICD-10-CM

## 2017-09-25 DIAGNOSIS — F41.9 ANXIETY: ICD-10-CM

## 2017-09-25 PROCEDURE — 90834 PSYTX W PT 45 MINUTES: CPT | Mod: S$GLB,,, | Performed by: SOCIAL WORKER

## 2017-09-25 NOTE — PROGRESS NOTES
Individual Psychotherapy (PhD/LCSW)    9/25/2017    Site: [facility]    Therapeutic Intervention: Met with patient alone.  Outpatient - Insight oriented psychotherapy 45 min - CPT code 80619    Chief complaint/reason for encounter: depression, anxiety, distractibility     Interval history and content of current session: Pt enjoyed visit to friends in [place] for holiday. Discuss time management at work, particularly how to set limits on time spent with a patient, and ways to avoid promising to return. Pt is receptive.    Treatment plan:  · Target symptoms: depression, anxiety, distractibility  · Why chosen therapy is appropriate versus another modality: relevant to diagnosis  · Outcome monitoring methods: self-report  · Therapeutic intervention type: insight oriented psychotherapy    Risk parameters:  Patient reports no suicidal ideation  Patient reports no homicidal ideation  Patient reports no self-injurious behavior  Patient reports no violent behavior    Verbal deficits: None    Patient's response to intervention:  The patient's response to intervention is motivated    Progress toward goals and other mental status changes:  The patient's progress toward goals is good    Diagnosis: Major depression, recurrent in partial remission, anxiety,  ADD     Plan:  individual psychotherapy    Return to clinic: as scheduled

## 2017-09-28 ENCOUNTER — NUTRITION (OUTPATIENT)
Dept: NUTRITION | Facility: CLINIC | Age: 67
End: 2017-09-28
Payer: COMMERCIAL

## 2017-09-28 DIAGNOSIS — Z71.3 DIETARY SURVEILLANCE AND COUNSELING: Primary | ICD-10-CM

## 2017-09-28 PROCEDURE — 97802 MEDICAL NUTRITION INDIV IN: CPT | Mod: S$GLB,,, | Performed by: DIETITIAN, REGISTERED

## 2017-09-28 NOTE — PROGRESS NOTES
Nutrition Assessment for Medical Nutrition Therapy    Referring professional: self, Dr. Fournier    Reason for MNT visit: Pt in for education and nutrition counseling regarding healthy eating, meal planning and BMI = 28, overweight and desire to lose weight.     Medical History: Hyperglycemia, hypertension    Medications:   Current Outpatient Prescriptions:     aspirin (ECOTRIN) 81 MG EC tablet, Take 81 mg by mouth. 1 Tablet, Delayed Release (E.C.) Oral Every day, Disp: , Rfl:     buPROPion (WELLBUTRIN XL) 300 MG 24 hr tablet, Take 300 mg by mouth once daily., Disp: , Rfl:     calcium-vitamin D 600 mg(1,500mg) -200 unit Tab, Take 1 tablet by mouth once daily. 2 Tablets Oral Every day, Disp: , Rfl:     chlorhexidine (PERIDEX) 0.12 % solution, Use as directed 15 mLs in the mouth or throat 2 (two) times daily. , Disp: , Rfl:     dicyclomine (BENTYL) 20 mg tablet, Take 1 tablet (20 mg total) by mouth 3 (three) times daily as needed. (Patient taking differently: Take 20 mg by mouth 3 (three) times daily as needed. Pt taking prn.), Disp: 14 tablet, Rfl: 0    duloxetine (CYMBALTA) 30 MG capsule, Take 30 mg by mouth once daily. , Disp: , Rfl:     duloxetine (CYMBALTA) 60 MG capsule, 1 Capsule, Delayed Release(E.C.) Oral Every day, Disp: 30 capsule, Rfl: 3    lisdexamfetamine (VYVANSE) 70 MG capsule, Take 70 mg by mouth every morning., Disp: , Rfl:     lisinopril (PRINIVIL,ZESTRIL) 20 MG tablet, Take 1 tablet (20 mg total) by mouth once daily., Disp: 90 tablet, Rfl: 3    metoprolol tartrate (LOPRESSOR) 25 MG tablet, Take 1 tablet (25 mg total) by mouth 2 (two) times daily., Disp: 180 tablet, Rfl: 3    multivitamin-minerals-lutein (CENTRUM SILVER) Tab, Take by mouth. 1 Tablet Oral Every day, Disp: , Rfl:     ondansetron (ZOFRAN) 4 MG tablet, Take 1 tablet (4 mg total) by mouth every 6 (six) hours as needed for Nausea., Disp: 10 tablet, Rfl: 0    pravastatin (PRAVACHOL) 10 MG tablet, Take 1 tablet (10 mg total) by  mouth once daily., Disp: 90 tablet, Rfl: 3    vitamin C-biotin (HAIR-SKIN-NAILS, VIT C-BIOTIN,) 50 mg -1,250 mcg Chew, Take by mouth once daily., Disp: , Rfl:     Labs: total chol 199, HDL 65, , Trig 77, Hgb A1C 5.4%    Wt: 186 lbs     BMI: 28    Estimated energy requirements: 7071-6946 calories/day      Nutrition History     Lost weight when living and eating healthy at home with friends in [place] in 2010, gained back in 2012.     Food allergies  intolerances: eats kosher, so if wants meat/protein; needs to prepare it herself       Meal preparation/shopping: [employer] ([place]),  [employer]- can get some fresh kosher meat, [employer]     Current Typical day recall:  Up at 6am (starving) bowl of cereal (kashi vanilla shredded wheat, Life cereal, brown rice krispies + cocoa crispies) with 1% Horizon milk, if the weekend- go back to bed and then up with more cereal  Trying to make eggs on the weekend or for dinner  Will eat donuts at work chiara if from [employer]    Biscuit + morning star farm maple sausage (veggie)    Yogurt (noosa or Fage plain with honey and fruit + nuts) + nuts; as snacks or meals    Lunch:  Tries to bring soup or leftovers  Thin pack of kosher salami on holla roll with mustard  Avocado   Smoked Turkey on a roll   PB& J     quorn - non meat from fungus -- use it to make cano or tortilla chicken soup with sour cream   naan pizzas     Don't really cook because doesn't have much kitchen space, a lot of nicer equipment unopened or in storage  Typical though  Crackers & cheese  Susan & hummus or with veggies  tofurkey sometimes     Boca burgers, but doesn't care for black bean burgers     Desire for cooking or preparing food is very minimal     Whole life challenge thought?     Supplements:  iron    Recommendations/Interventions:    Eat breakfast within 1-2 hours of waking up  Try not to skip any meals or snacks, not going more than 3-4 hours without eating.   At each meal and  snack, try to include a source of fiber + lean protein + healthy fat.     Breakfast      Iconic   Orgain protein drink    Kashi or Vans (7,8 grain or blueberry) + peanut butter    Oatmeal + Fairlife    Oatmega Protein bar    Kashi cereal + Fairlife milk or protein unsweetened almond milk ([place])     lunch        leftovers    smoothie   boca burger + veggies or salad    Snacks    Sargento balance breaks     Any breakfast option   PB & J    Yogurt bowl:   o 2% Fage   o vanilla extract  o stevia sweet leaf sweet drops or ½ teaspoon honey   o ¾ cup fruit   o ¼ cup Kashi Go Lean cereal (instead of a granola)     dinner  7pm    Fruit + Yogurt Smoothie  o Fruit (3/4 cup)  o 6-8 ounces plain Greek yogurt   o splash of vanilla extract    o ½ teaspoon honey, optional or stevia drops   o 1 tablespoon nut butter   o Handful or two of spinach  o 3-4 ice cubes (not needed if using frozen fruit)  o ½-1 cup Fairlife Skim Milk or unsweetened almond milk    Protein powder Smoothie  o 1 scoop (low sugar) protein powder (like this)  o Fruit (3/4 cup) - optional   o 1 tablespoon nut butter   o Handful or two of spinach  o 3-4 ice cubes (not needed if using frozen fruit)  o ½-1 cup Fairlife Skim Milk or unsweetened almond milk      crackers + cheese + veggie   hummus + veggies   eggs + veggies à frittata   beatnik frozen option      Comprehension: good     Motivation to change: low      Follow-up: 4 weeks     Counseling time: 1 Hour

## 2017-10-02 ENCOUNTER — OFFICE VISIT (OUTPATIENT)
Dept: PSYCHIATRY | Facility: CLINIC | Age: 67
End: 2017-10-02
Payer: COMMERCIAL

## 2017-10-02 ENCOUNTER — PATIENT MESSAGE (OUTPATIENT)
Dept: INTERNAL MEDICINE | Facility: CLINIC | Age: 67
End: 2017-10-02

## 2017-10-02 DIAGNOSIS — F98.8 ATTENTION DEFICIT DISORDER, UNSPECIFIED HYPERACTIVITY PRESENCE: ICD-10-CM

## 2017-10-02 DIAGNOSIS — F41.9 ANXIETY: ICD-10-CM

## 2017-10-02 DIAGNOSIS — F33.41 MAJOR DEPRESSIVE DISORDER, RECURRENT EPISODE, IN PARTIAL REMISSION: Primary | ICD-10-CM

## 2017-10-02 PROCEDURE — 90834 PSYTX W PT 45 MINUTES: CPT | Mod: S$GLB,,, | Performed by: SOCIAL WORKER

## 2017-10-02 NOTE — PROGRESS NOTES
Individual Psychotherapy (PhD/LCSW)    10/2/2017    Site: [facility]    Therapeutic Intervention: Met with patient alone.  Outpatient - Insight oriented psychotherapy 45 min - CPT code 89767    Chief complaint/reason for encounter: depression, anxiety, distractibility     Interval history and content of current session: Pt finding she is more able than in the past to get herself to follow through on things like prepping lunch for the next day, emptying the , clearing the counter. Discuss increased sense of empowerment and motivation. Encourage pt to continue to notice and commend herself for continuing to take these positive steps. She met with a nutritionist she really liked, Kaylie Rosas, and is motivated to change eating patterns.  Discuss challenges of time management at work and ways to begin to address them.    Treatment plan:  · Target symptoms: depression, anxiety, distractibility  · Why chosen therapy is appropriate versus another modality: relevant to diagnosis  · Outcome monitoring methods: self-report  · Therapeutic intervention type: insight oriented psychotherapy    Risk parameters:  Patient reports no suicidal ideation  Patient reports no homicidal ideation  Patient reports no self-injurious behavior  Patient reports no violent behavior    Verbal deficits: None    Patient's response to intervention:  The patient's response to intervention is motivated    Progress toward goals and other mental status changes:  The patient's progress toward goals is good    Diagnosis: Major depression, recurrent in partial remission, anxiety,  ADD     Plan:  individual psychotherapy    Return to clinic: as scheduled

## 2017-10-12 ENCOUNTER — PATIENT OUTREACH (OUTPATIENT)
Dept: OTHER | Facility: OTHER | Age: 67
End: 2017-10-12

## 2017-10-12 ENCOUNTER — OFFICE VISIT (OUTPATIENT)
Dept: ORTHOPEDICS | Facility: CLINIC | Age: 67
End: 2017-10-12
Payer: COMMERCIAL

## 2017-10-12 ENCOUNTER — HOSPITAL ENCOUNTER (OUTPATIENT)
Dept: RADIOLOGY | Facility: HOSPITAL | Age: 67
Discharge: HOME | End: 2017-10-12
Attending: ORTHOPAEDIC SURGERY
Payer: COMMERCIAL

## 2017-10-12 DIAGNOSIS — S92.355D CLOSED NONDISPLACED FRACTURE OF FIFTH METATARSAL BONE OF LEFT FOOT WITH ROUTINE HEALING, SUBSEQUENT ENCOUNTER: ICD-10-CM

## 2017-10-12 DIAGNOSIS — S92.355D CLOSED NONDISPLACED FRACTURE OF FIFTH METATARSAL BONE OF LEFT FOOT WITH ROUTINE HEALING, SUBSEQUENT ENCOUNTER: Primary | ICD-10-CM

## 2017-10-12 PROCEDURE — 99212 OFFICE O/P EST SF 10 MIN: CPT | Mod: S$GLB,,, | Performed by: ORTHOPAEDIC SURGERY

## 2017-10-12 PROCEDURE — 99999 PR PBB SHADOW E&M-EST. PATIENT-LVL I: CPT | Mod: PBBFAC,,, | Performed by: ORTHOPAEDIC SURGERY

## 2017-10-12 NOTE — PROGRESS NOTES
Last 5 Patient Entered Readings Current 30 Day Average: 135/83     Recent Readings 11/9/2017 11/9/2017 11/5/2017 11/5/2017 10/9/2017    Systolic BP (mmHg) 139 139 131 131 130    Diastolic BP (mmHg) 80 80 86 86 73    Pulse 69 69 65 65 69        Hypertension Digital Medicine (HDMP) Health  Follow Up    LVM to follow up with  Manisha Swathi Wynne. 3rd attempt.    Per 30 day average, blood pressure is well controlled 135/83 mmHg. Encouraged adherence to low sodium diet and physical activity guidelines. Advised patient to call or message with questions or concerns. WCB in 3-4 weeks.

## 2017-10-12 NOTE — PROGRESS NOTES
Ms. Wynne returns today.  It has been 4 months since she sustained a fracture of her left fifth metatarsal.  Since her last visit she has not had any significant pain and is currently pain-free.  Examination reveals no tenderness over the left fifth metatarsal.  I am not can obtain an x-ray today.  I told her to avoid any high-impact activities for at least the next couple months otherwise she can progress her activity as tolerated on her return to see me as needed.

## 2017-10-16 ENCOUNTER — OFFICE VISIT (OUTPATIENT)
Dept: URGENT CARE | Facility: CLINIC | Age: 67
End: 2017-10-16
Payer: COMMERCIAL

## 2017-10-16 VITALS
WEIGHT: 186 LBS | SYSTOLIC BLOOD PRESSURE: 117 MMHG | OXYGEN SATURATION: 98 % | DIASTOLIC BLOOD PRESSURE: 69 MMHG | HEIGHT: 68 IN | RESPIRATION RATE: 18 BRPM | HEART RATE: 68 BPM | BODY MASS INDEX: 28.19 KG/M2 | TEMPERATURE: 100 F

## 2017-10-16 DIAGNOSIS — J02.9 ACUTE PHARYNGITIS, UNSPECIFIED ETIOLOGY: Primary | ICD-10-CM

## 2017-10-16 PROCEDURE — 96372 THER/PROPH/DIAG INJ SC/IM: CPT | Mod: S$GLB,,, | Performed by: INTERNAL MEDICINE

## 2017-10-16 PROCEDURE — 99213 OFFICE O/P EST LOW 20 MIN: CPT | Mod: 25,S$GLB,, | Performed by: INTERNAL MEDICINE

## 2017-10-16 RX ORDER — BETAMETHASONE SODIUM PHOSPHATE AND BETAMETHASONE ACETATE 3; 3 MG/ML; MG/ML
9 INJECTION, SUSPENSION INTRA-ARTICULAR; INTRALESIONAL; INTRAMUSCULAR; SOFT TISSUE ONCE
Status: COMPLETED | OUTPATIENT
Start: 2017-10-16 | End: 2017-10-16

## 2017-10-16 RX ORDER — AZITHROMYCIN 250 MG/1
TABLET, FILM COATED ORAL
Qty: 6 TABLET | Refills: 0 | Status: SHIPPED | OUTPATIENT
Start: 2017-10-16 | End: 2018-02-11 | Stop reason: ALTCHOICE

## 2017-10-16 RX ADMIN — BETAMETHASONE SODIUM PHOSPHATE AND BETAMETHASONE ACETATE 9 MG: 3; 3 INJECTION, SUSPENSION INTRA-ARTICULAR; INTRALESIONAL; INTRAMUSCULAR; SOFT TISSUE at 08:10

## 2017-10-16 NOTE — LETTER
October 16, 2017      [facility] [place]  [address]  [place] [address]  Phone: [phone]  Fax: [phone]       Patient: Manisha Wynne   YOB: 1950  Date of Visit: 10/16/2017    To Whom It May Concern:    Nydia Wynne  was at [facility] on 10/16/2017. She may return to work/school on 10/18/2017 with no restrictions. If you have any questions or concerns, or if I can be of further assistance, please do not hesitate to contact me.    Sincerely,    Aayush Bundy MD

## 2017-10-17 NOTE — PROGRESS NOTES
"Subjective:       Patient ID: Manisha Wynne is a 67 y.o. female.    Vitals:  height is 5' 8" (1.727 m) and weight is 84.4 kg (186 lb). Her temperature is 99.8 °F (37.7 °C). Her blood pressure is 117/69 and her pulse is 68. Her respiration is 18 and oxygen saturation is 98%.     Chief Complaint: Sore Throat    Sore Throat    This is a new problem. The current episode started in the past 7 days. The problem has been unchanged. There has been no fever. The pain is at a severity of 6/10. The pain is moderate. Associated symptoms include headaches and swollen glands. Pertinent negatives include no abdominal pain, congestion, coughing, ear pain, hoarse voice or shortness of breath. She has had exposure to strep.     Review of Systems   Constitution: Negative for chills, fever and malaise/fatigue.   HENT: Positive for sore throat. Negative for congestion, ear pain and hoarse voice.    Eyes: Negative for discharge and redness.   Cardiovascular: Negative for chest pain, dyspnea on exertion and leg swelling.   Respiratory: Negative for cough, shortness of breath, sputum production and wheezing.    Musculoskeletal: Negative for myalgias.   Gastrointestinal: Negative for abdominal pain and nausea.   Neurological: Positive for headaches.       Objective:      Physical Exam   Constitutional: She appears well-developed and well-nourished.   HENT:   Head: Normocephalic and atraumatic.   Mouth/Throat: Oropharyngeal exudate, posterior oropharyngeal edema and posterior oropharyngeal erythema present.   Eyes: Conjunctivae and EOM are normal. Pupils are equal, round, and reactive to light.   Neck: Normal range of motion.   Cardiovascular: Normal rate and regular rhythm.    Pulmonary/Chest: Effort normal and breath sounds normal.   Lymphadenopathy:     She has cervical adenopathy.       Assessment:       1. Acute pharyngitis, unspecified etiology        Plan:         Acute pharyngitis, unspecified etiology  -     betamethasone " acetate-betamethasone sodium phosphate injection 9 mg; Inject 1.5 mLs (9 mg total) into the muscle once.  -     azithromycin (ZITHROMAX Z-SHIVANI) 250 MG tablet; Take 2 tablets (500 mg) on  Day 1,  followed by 1 tablet (250 mg) once daily on Days 2 through 5.  Dispense: 6 tablet; Refill: 0

## 2017-10-18 ENCOUNTER — CLINICAL SUPPORT (OUTPATIENT)
Dept: ELECTROPHYSIOLOGY | Facility: CLINIC | Age: 67
End: 2017-10-18
Payer: COMMERCIAL

## 2017-10-18 DIAGNOSIS — I48.0 PAF (PAROXYSMAL ATRIAL FIBRILLATION): Chronic | ICD-10-CM

## 2017-10-18 DIAGNOSIS — Z95.0 CARDIAC PACEMAKER IN SITU: ICD-10-CM

## 2017-10-18 DIAGNOSIS — I49.5 SSS (SICK SINUS SYNDROME): Chronic | ICD-10-CM

## 2017-10-18 PROCEDURE — 93293 PM PHONE R-STRIP DEVICE EVAL: CPT | Mod: S$GLB,,, | Performed by: INTERNAL MEDICINE

## 2017-10-20 ENCOUNTER — TELEPHONE (OUTPATIENT)
Dept: INTERNAL MEDICINE | Facility: CLINIC | Age: 67
End: 2017-10-20

## 2017-10-20 DIAGNOSIS — R79.89 ELEVATED LFTS: Primary | ICD-10-CM

## 2017-10-23 ENCOUNTER — PATIENT MESSAGE (OUTPATIENT)
Dept: INTERNAL MEDICINE | Facility: CLINIC | Age: 67
End: 2017-10-23

## 2017-10-30 ENCOUNTER — PATIENT MESSAGE (OUTPATIENT)
Dept: PSYCHIATRY | Facility: CLINIC | Age: 67
End: 2017-10-30

## 2017-10-30 ENCOUNTER — OFFICE VISIT (OUTPATIENT)
Dept: PSYCHIATRY | Facility: CLINIC | Age: 67
End: 2017-10-30
Payer: COMMERCIAL

## 2017-10-30 DIAGNOSIS — F98.8 ATTENTION DEFICIT DISORDER, UNSPECIFIED HYPERACTIVITY PRESENCE: ICD-10-CM

## 2017-10-30 DIAGNOSIS — F33.41 MAJOR DEPRESSIVE DISORDER, RECURRENT EPISODE, IN PARTIAL REMISSION: Primary | ICD-10-CM

## 2017-10-30 DIAGNOSIS — F41.9 ANXIETY: ICD-10-CM

## 2017-10-30 PROCEDURE — 90834 PSYTX W PT 45 MINUTES: CPT | Mod: S$GLB,,, | Performed by: SOCIAL WORKER

## 2017-10-30 NOTE — PROGRESS NOTES
"Individual Psychotherapy (PhD/LCSW)    10/30/2017    Site: [facility]    Therapeutic Intervention: Met with patient alone.  Outpatient - Insight oriented psychotherapy 45 min - CPT code 84203    Chief complaint/reason for encounter: depression, anxiety, distractibility     Interval history and content of current session: Pt doing well. She hired a cleaning service to straighten her apartment, got her bed made and is keeping this up. She will visit cousins in [place] for Thanksgiving. She has gotten herself tickets for events she wants to take, feels "I'm taking some control over my life."    Treatment plan:  · Target symptoms: depression, anxiety, distractibility  · Why chosen therapy is appropriate versus another modality: relevant to diagnosis  · Outcome monitoring methods: self-report  · Therapeutic intervention type: insight oriented psychotherapy    Risk parameters:  Patient reports no suicidal ideation  Patient reports no homicidal ideation  Patient reports no self-injurious behavior  Patient reports no violent behavior    Verbal deficits: None    Patient's response to intervention:  The patient's response to intervention is motivated    Progress toward goals and other mental status changes:  The patient's progress toward goals is good    Diagnosis: Major depression, recurrent in partial remission, anxiety,  ADD     Plan:  individual psychotherapy    Return to clinic: as scheduled  "

## 2017-11-08 ENCOUNTER — HOSPITAL ENCOUNTER (OUTPATIENT)
Dept: RADIOLOGY | Facility: CLINIC | Age: 67
Discharge: HOME | End: 2017-11-08
Attending: INTERNAL MEDICINE
Payer: COMMERCIAL

## 2017-11-08 DIAGNOSIS — M85.80 OSTEOPENIA, UNSPECIFIED LOCATION: ICD-10-CM

## 2017-11-08 PROCEDURE — 77080 DXA BONE DENSITY AXIAL: CPT | Mod: 26,,, | Performed by: INTERNAL MEDICINE

## 2017-11-08 PROCEDURE — 77080 DXA BONE DENSITY AXIAL: CPT | Mod: TC

## 2017-11-13 ENCOUNTER — OFFICE VISIT (OUTPATIENT)
Dept: PSYCHIATRY | Facility: CLINIC | Age: 67
End: 2017-11-13
Payer: COMMERCIAL

## 2017-11-13 DIAGNOSIS — F98.8 ATTENTION DEFICIT DISORDER, UNSPECIFIED HYPERACTIVITY PRESENCE: ICD-10-CM

## 2017-11-13 DIAGNOSIS — F33.41 MAJOR DEPRESSIVE DISORDER, RECURRENT EPISODE, IN PARTIAL REMISSION: Primary | ICD-10-CM

## 2017-11-13 DIAGNOSIS — F41.9 ANXIETY: ICD-10-CM

## 2017-11-13 PROCEDURE — 90834 PSYTX W PT 45 MINUTES: CPT | Mod: S$GLB,,, | Performed by: SOCIAL WORKER

## 2017-11-13 NOTE — PROGRESS NOTES
Individual Psychotherapy (PhD/LCSW)    11/13/2017    Site: [facility]    Therapeutic Intervention: Met with patient alone.  Outpatient - Insight oriented psychotherapy 45 min - CPT code 70617    Chief complaint/reason for encounter: depression, anxiety, distractibility     Interval history and content of current session: Pt on probation at work due to lateness and not seeing enough patients. She was initially very upset but is challenging herself to change her behaviors and having some success in getting in on time, using several good strategies. Discuss difficulty setting limits with patients which leads her to spend more time than she would like with some individuals. Agree we can work on limit-setting.     Treatment plan:  · Target symptoms: depression, anxiety, distractibility  · Why chosen therapy is appropriate versus another modality: relevant to diagnosis  · Outcome monitoring methods: self-report  · Therapeutic intervention type: insight oriented psychotherapy    Risk parameters:  Patient reports no suicidal ideation  Patient reports no homicidal ideation  Patient reports no self-injurious behavior  Patient reports no violent behavior    Verbal deficits: None    Patient's response to intervention:  The patient's response to intervention is motivated    Progress toward goals and other mental status changes:  The patient's progress toward goals is good    Diagnosis: Major depression, recurrent in partial remission, anxiety,  ADD     Plan:  individual psychotherapy    Return to clinic: as scheduled

## 2017-11-14 ENCOUNTER — PATIENT MESSAGE (OUTPATIENT)
Dept: INTERNAL MEDICINE | Facility: CLINIC | Age: 67
End: 2017-11-14

## 2017-11-20 ENCOUNTER — PATIENT OUTREACH (OUTPATIENT)
Dept: OTHER | Facility: OTHER | Age: 67
End: 2017-11-20

## 2017-11-20 NOTE — PROGRESS NOTES
Called patient to review readings. LVM.   BP at goal, <141/90 mmHg given 9% ASCVD risk  Continue monitoring      Last 5 Patient Entered Readings Current 30 Day Average: 135/83     Recent Readings 11/9/2017 11/9/2017 11/5/2017 11/5/2017 10/9/2017    Systolic BP (mmHg) 139 139 131 131 130    Diastolic BP (mmHg) 80 80 86 86 73    Pulse 69 69 65 65 69

## 2017-11-27 ENCOUNTER — OFFICE VISIT (OUTPATIENT)
Dept: PSYCHIATRY | Facility: CLINIC | Age: 67
End: 2017-11-27
Payer: COMMERCIAL

## 2017-11-27 DIAGNOSIS — F41.9 ANXIETY: ICD-10-CM

## 2017-11-27 DIAGNOSIS — F98.8 ATTENTION DEFICIT DISORDER, UNSPECIFIED HYPERACTIVITY PRESENCE: ICD-10-CM

## 2017-11-27 DIAGNOSIS — F33.41 MAJOR DEPRESSIVE DISORDER, RECURRENT EPISODE, IN PARTIAL REMISSION: Primary | ICD-10-CM

## 2017-11-27 PROCEDURE — 90834 PSYTX W PT 45 MINUTES: CPT | Mod: S$GLB,,, | Performed by: SOCIAL WORKER

## 2017-11-29 ENCOUNTER — TELEPHONE (OUTPATIENT)
Dept: INTERNAL MEDICINE | Facility: CLINIC | Age: 67
End: 2017-11-29

## 2017-12-01 ENCOUNTER — PATIENT OUTREACH (OUTPATIENT)
Dept: OTHER | Facility: OTHER | Age: 67
End: 2017-12-01

## 2017-12-06 ENCOUNTER — PATIENT MESSAGE (OUTPATIENT)
Dept: INTERNAL MEDICINE | Facility: CLINIC | Age: 67
End: 2017-12-06

## 2017-12-06 ENCOUNTER — TELEPHONE (OUTPATIENT)
Dept: INTERNAL MEDICINE | Facility: CLINIC | Age: 67
End: 2017-12-06

## 2017-12-08 ENCOUNTER — PATIENT MESSAGE (OUTPATIENT)
Dept: ADMINISTRATIVE | Facility: OTHER | Age: 67
End: 2017-12-08

## 2017-12-11 ENCOUNTER — OFFICE VISIT (OUTPATIENT)
Dept: PSYCHIATRY | Facility: CLINIC | Age: 67
End: 2017-12-11
Payer: COMMERCIAL

## 2017-12-11 DIAGNOSIS — F41.9 ANXIETY: ICD-10-CM

## 2017-12-11 DIAGNOSIS — F98.8 ATTENTION DEFICIT DISORDER, UNSPECIFIED HYPERACTIVITY PRESENCE: ICD-10-CM

## 2017-12-11 DIAGNOSIS — F33.1 MAJOR DEPRESSIVE DISORDER, RECURRENT, MODERATE: Primary | ICD-10-CM

## 2017-12-11 PROCEDURE — 90834 PSYTX W PT 45 MINUTES: CPT | Mod: S$GLB,,, | Performed by: SOCIAL WORKER

## 2017-12-11 NOTE — PROGRESS NOTES
Individual Psychotherapy (PhD/LCSW)    12/11/2017    Site: [facility]    Therapeutic Intervention: Met with patient alone.  Outpatient - Insight oriented psychotherapy 45 min - CPT code 43687    Chief complaint/reason for encounter: depression, anxiety, distractibility     Interval history and content of current session: Pt continues to get to work on time, with one major glitch, but feels constant pressure to maintain this, and notes increased depression. She is unsure if this is seasonal or situational. She is in the process of adopting 2 kittens, has had cats most of her life, and hopes that this will resolve her loneliness and the depression. Discuss her feeling overwhelmed by large array of pet products and gear, suggest that this is at least in part a normal response to a huge increase in available merchandise since she last had cats 8 years ago. Pt is cooperative and motivated.    Treatment plan:  · Target symptoms: depression, anxiety, distractibility  · Why chosen therapy is appropriate versus another modality: relevant to diagnosis  · Outcome monitoring methods: self-report  · Therapeutic intervention type: insight oriented psychotherapy    Risk parameters:  Patient reports no suicidal ideation  Patient reports no homicidal ideation  Patient reports no self-injurious behavior  Patient reports no violent behavior    Verbal deficits: None    Patient's response to intervention:  The patient's response to intervention is motivated    Progress toward goals and other mental status changes:  The patient's progress toward goals is fair    Diagnosis: Major depression, recurrent, moderate, anxiety,  ADD     Plan:  individual psychotherapy    Return to clinic: as scheduled

## 2017-12-12 ENCOUNTER — TELEPHONE (OUTPATIENT)
Dept: INTERNAL MEDICINE | Facility: CLINIC | Age: 67
End: 2017-12-12

## 2017-12-12 DIAGNOSIS — R79.89 ELEVATED LFTS: Primary | ICD-10-CM

## 2017-12-18 ENCOUNTER — OFFICE VISIT (OUTPATIENT)
Dept: PSYCHIATRY | Facility: CLINIC | Age: 67
End: 2017-12-18
Payer: COMMERCIAL

## 2017-12-18 DIAGNOSIS — F41.9 ANXIETY: ICD-10-CM

## 2017-12-18 DIAGNOSIS — F33.41 MAJOR DEPRESSIVE DISORDER, RECURRENT EPISODE, IN PARTIAL REMISSION: Primary | ICD-10-CM

## 2017-12-18 DIAGNOSIS — F98.8 ATTENTION DEFICIT DISORDER (ADD) IN ADULT: ICD-10-CM

## 2017-12-18 PROCEDURE — 90834 PSYTX W PT 45 MINUTES: CPT | Mod: S$GLB,,, | Performed by: SOCIAL WORKER

## 2017-12-18 NOTE — PROGRESS NOTES
"Individual Psychotherapy (PhD/LCSW)    12/18/2017    Site: [facility]    Therapeutic Intervention: Met with patient alone.  Outpatient - Insight oriented psychotherapy 45 min - CPT code 18524    Chief complaint/reason for encounter: depression, anxiety, distractibility     Interval history and content of current session: Pt doing well, getting to work on time, enjoying kittens while feeling the challenge of "parenting" them. Discuss changes she is maintaining and pleasure she is having in her life. Making plans for trip to [facility].    Treatment plan:  · Target symptoms: depression, anxiety, distractibility  · Why chosen therapy is appropriate versus another modality: relevant to diagnosis  · Outcome monitoring methods: self-report  · Therapeutic intervention type: insight oriented psychotherapy    Risk parameters:  Patient reports no suicidal ideation  Patient reports no homicidal ideation  Patient reports no self-injurious behavior  Patient reports no violent behavior    Verbal deficits: None    Patient's response to intervention:  The patient's response to intervention is motivated    Progress toward goals and other mental status changes:  The patient's progress toward goals is good    Diagnosis: Major depression, recurrent, in partial remission, anxiety,  ADD     Plan:  individual psychotherapy    Return to clinic: as scheduled  "

## 2018-01-03 ENCOUNTER — OFFICE VISIT (OUTPATIENT)
Dept: OBSTETRICS AND GYNECOLOGY | Facility: CLINIC | Age: 68
End: 2018-01-03
Payer: COMMERCIAL

## 2018-01-03 VITALS
BODY MASS INDEX: 29.27 KG/M2 | DIASTOLIC BLOOD PRESSURE: 82 MMHG | SYSTOLIC BLOOD PRESSURE: 146 MMHG | WEIGHT: 193.13 LBS | HEIGHT: 68 IN

## 2018-01-03 DIAGNOSIS — Z01.419 WELL WOMAN EXAM WITH ROUTINE GYNECOLOGICAL EXAM: Primary | ICD-10-CM

## 2018-01-03 DIAGNOSIS — R68.89 HEAT INTOLERANCE: ICD-10-CM

## 2018-01-03 DIAGNOSIS — Z12.4 PAP SMEAR FOR CERVICAL CANCER SCREENING: ICD-10-CM

## 2018-01-03 PROCEDURE — 99999 PR PBB SHADOW E&M-EST. PATIENT-LVL II: CPT | Mod: PBBFAC,,, | Performed by: OBSTETRICS & GYNECOLOGY

## 2018-01-03 PROCEDURE — 88175 CYTOPATH C/V AUTO FLUID REDO: CPT

## 2018-01-03 PROCEDURE — 99387 INIT PM E/M NEW PAT 65+ YRS: CPT | Mod: S$GLB,,, | Performed by: OBSTETRICS & GYNECOLOGY

## 2018-01-03 NOTE — LETTER
January 17, 2018      Iris Blue MD  [address]  [facility] [address]           [facility] - OB/GYN 5th Floor  [address]  [facility] [address]  Phone: [phone]          Patient: Manisha Wynne   MR Number: [id number]   YOB: 1950   Date of Visit: 1/3/2018       Dear Dr. Iris Blue:    Thank you for referring Manisha Wynne to me for evaluation. Attached you will find relevant portions of my assessment and plan of care.    If you have questions, please do not hesitate to call me. I look forward to following Manisha yWnne along with you.    Sincerely,    Jj Gamble  CC:  No Recipients    If you would like to receive this communication electronically, please contact [email or web address] or [phone] to request more information on [facility] access.    For providers and/or their staff who would like to refer a patient to [facility], please contact us through our one-stop-shop provider referral line, [facility] , at [phone].    If you feel you have received this communication in error or would no longer like to receive these types of communications, please e-mail [email or web address]

## 2018-01-03 NOTE — PROGRESS NOTES
Subjective:       Patient ID: Manisha Wynne is a 67 y.o. female.    Chief Complaint:  Well Woman      History of Present Illness  HPI  This 67 yr old P0 female is here for routine exam and has no complaints.  She is not taking HRT.  She states she has been getting really hot over the last yr and sweating .  Her TSH was mildly low in 2017 but did not get T3 T4.  Will do this with her next lab draw.  She had CKC in  and has never had abnormality since  Her mammogram was normal in spring 2017.  Osteopenia on DEXA  She had pacemaker placed due to syncopal episode in     GYN & OB History  Patient's last menstrual period was 10/01/2003.   Date of Last Pap: 11/15/2013    OB History    Para Term  AB Living   1       1     SAB TAB Ectopic Multiple Live Births                  # Outcome Date GA Lbr Jag/2nd Weight Sex Delivery Anes PTL Lv   1 AB                   Review of Systems  Review of Systems   Constitutional: Negative for chills and fever.   Respiratory: Negative for shortness of breath.    Cardiovascular: Negative for chest pain.   Gastrointestinal: Negative for abdominal pain, nausea and vomiting.   Endocrine: Positive for heat intolerance.   Genitourinary: Negative for difficulty urinating, dyspareunia, genital sores, menstrual problem, pelvic pain, vaginal bleeding, vaginal discharge and vaginal pain.   Skin: Negative for wound.   Hematological: Negative for adenopathy.           Objective:    Physical Exam:   Constitutional: She is oriented to person, place, and time. She appears well-developed and well-nourished.    HENT:   Head: Normocephalic.    Eyes: EOM are normal.    Neck: Normal range of motion.    Cardiovascular: Normal rate.     Pulmonary/Chest: Effort normal. She exhibits no mass and no tenderness. Right breast exhibits no inverted nipple, no mass, no skin change and no tenderness. Left breast exhibits no inverted nipple, no mass, no skin change and no tenderness.         Abdominal: Soft. She exhibits no distension. There is no tenderness.     Genitourinary: Vagina normal and uterus normal. There is no rash, tenderness or lesion on the right labia. There is no rash, tenderness or lesion on the left labia. Uterus is not tender. Cervix is normal. Right adnexum displays no mass, no tenderness and no fullness. Left adnexum displays no mass, no tenderness and no fullness. Cervix exhibits no discharge.   Genitourinary Comments: Some atrophy           Musculoskeletal: Normal range of motion.       Neurological: She is alert and oriented to person, place, and time.    Skin: Skin is warm and dry.    Psychiatric: She has a normal mood and affect.          Assessment:        1. Well woman exam with routine gynecological exam    2. Heat intolerance    3. Pap smear for cervical cancer screening               Plan:      Pap prn  Well woman yearly  Mammogram yearly  Check her T# and follow up

## 2018-01-08 ENCOUNTER — PATIENT MESSAGE (OUTPATIENT)
Dept: INTERNAL MEDICINE | Facility: CLINIC | Age: 68
End: 2018-01-08

## 2018-01-19 ENCOUNTER — LAB VISIT (OUTPATIENT)
Dept: LAB | Facility: HOSPITAL | Age: 68
End: 2018-01-19
Attending: INTERNAL MEDICINE
Payer: COMMERCIAL

## 2018-01-19 DIAGNOSIS — R68.89 HEAT INTOLERANCE: ICD-10-CM

## 2018-01-19 DIAGNOSIS — R79.89 ELEVATED LFTS: ICD-10-CM

## 2018-01-19 LAB
ALBUMIN SERPL BCP-MCNC: 3.8 G/DL
ALP SERPL-CCNC: 80 U/L
ALT SERPL W/O P-5'-P-CCNC: 34 U/L
ANION GAP SERPL CALC-SCNC: 6 MMOL/L
AST SERPL-CCNC: 29 U/L
BILIRUB SERPL-MCNC: 0.9 MG/DL
BUN SERPL-MCNC: 26 MG/DL
CALCIUM SERPL-MCNC: 10.2 MG/DL
CHLORIDE SERPL-SCNC: 104 MMOL/L
CO2 SERPL-SCNC: 29 MMOL/L
CREAT SERPL-MCNC: 1.1 MG/DL
EST. GFR  (AFRICAN AMERICAN): 59.2 ML/MIN/1.73 M^2
EST. GFR  (NON AFRICAN AMERICAN): 51.3 ML/MIN/1.73 M^2
GLUCOSE SERPL-MCNC: 100 MG/DL
HAV IGM SERPL QL IA: NEGATIVE
HBV CORE IGM SERPL QL IA: NEGATIVE
HBV SURFACE AG SERPL QL IA: NEGATIVE
HCV AB SERPL QL IA: NEGATIVE
POTASSIUM SERPL-SCNC: 4.5 MMOL/L
PROT SERPL-MCNC: 7.2 G/DL
SODIUM SERPL-SCNC: 139 MMOL/L
T3FREE SERPL-MCNC: 2.7 PG/ML
T4 FREE SERPL-MCNC: 1.06 NG/DL
TSH SERPL DL<=0.005 MIU/L-ACNC: 3.16 UIU/ML

## 2018-01-19 PROCEDURE — 36415 COLL VENOUS BLD VENIPUNCTURE: CPT

## 2018-01-19 PROCEDURE — 84481 FREE ASSAY (FT-3): CPT

## 2018-01-19 PROCEDURE — 80053 COMPREHEN METABOLIC PANEL: CPT

## 2018-01-19 PROCEDURE — 84439 ASSAY OF FREE THYROXINE: CPT

## 2018-01-19 PROCEDURE — 80074 ACUTE HEPATITIS PANEL: CPT

## 2018-01-19 PROCEDURE — 84443 ASSAY THYROID STIM HORMONE: CPT

## 2018-01-22 ENCOUNTER — PATIENT MESSAGE (OUTPATIENT)
Dept: CARDIOLOGY | Facility: CLINIC | Age: 68
End: 2018-01-22

## 2018-01-22 DIAGNOSIS — E78.5 HYPERLIPIDEMIA, UNSPECIFIED HYPERLIPIDEMIA TYPE: ICD-10-CM

## 2018-01-22 DIAGNOSIS — I10 ESSENTIAL HYPERTENSION: Chronic | ICD-10-CM

## 2018-01-22 RX ORDER — PRAVASTATIN SODIUM 10 MG/1
10 TABLET ORAL DAILY
Qty: 90 TABLET | Refills: 1 | Status: SHIPPED | OUTPATIENT
Start: 2018-01-22 | End: 2018-08-07 | Stop reason: SDUPTHER

## 2018-01-22 RX ORDER — LISINOPRIL 20 MG/1
20 TABLET ORAL DAILY
Qty: 90 TABLET | Refills: 1 | Status: SHIPPED | OUTPATIENT
Start: 2018-01-22 | End: 2018-08-07 | Stop reason: SDUPTHER

## 2018-01-24 ENCOUNTER — PATIENT MESSAGE (OUTPATIENT)
Dept: INTERNAL MEDICINE | Facility: CLINIC | Age: 68
End: 2018-01-24

## 2018-01-24 ENCOUNTER — CLINICAL SUPPORT (OUTPATIENT)
Dept: ELECTROPHYSIOLOGY | Facility: CLINIC | Age: 68
End: 2018-01-24
Payer: COMMERCIAL

## 2018-01-24 DIAGNOSIS — Z95.0 CARDIAC PACEMAKER IN SITU: ICD-10-CM

## 2018-01-24 DIAGNOSIS — I49.5 SSS (SICK SINUS SYNDROME): Chronic | ICD-10-CM

## 2018-01-24 DIAGNOSIS — I48.0 PAF (PAROXYSMAL ATRIAL FIBRILLATION): Chronic | ICD-10-CM

## 2018-01-24 PROCEDURE — 93293 PM PHONE R-STRIP DEVICE EVAL: CPT | Mod: S$GLB,,, | Performed by: INTERNAL MEDICINE

## 2018-01-31 ENCOUNTER — PATIENT OUTREACH (OUTPATIENT)
Dept: OTHER | Facility: OTHER | Age: 68
End: 2018-01-31

## 2018-01-31 NOTE — PROGRESS NOTES
Last 5 Patient Entered Readings                                      Current 30 Day Average:      Recent Readings 12/20/2017 12/20/2017 12/3/2017 12/3/2017 11/9/2017    SBP (mmHg) 149 149 136 136 139    DBP (mmHg) 90 90 81 81 80    Pulse 75 75 62 62 69        1/31 - [place] 12, [place]. I will follow up in a few weeks.    2/20 - LVM

## 2018-02-11 ENCOUNTER — OFFICE VISIT (OUTPATIENT)
Dept: URGENT CARE | Facility: CLINIC | Age: 68
End: 2018-02-11
Payer: COMMERCIAL

## 2018-02-11 VITALS
HEIGHT: 68 IN | TEMPERATURE: 98 F | HEART RATE: 71 BPM | BODY MASS INDEX: 28.79 KG/M2 | OXYGEN SATURATION: 97 % | SYSTOLIC BLOOD PRESSURE: 159 MMHG | WEIGHT: 190 LBS | DIASTOLIC BLOOD PRESSURE: 86 MMHG

## 2018-02-11 DIAGNOSIS — K52.9 GASTROENTERITIS, ACUTE: Primary | ICD-10-CM

## 2018-02-11 PROCEDURE — 1159F MED LIST DOCD IN RCRD: CPT | Mod: S$GLB,,, | Performed by: INTERNAL MEDICINE

## 2018-02-11 PROCEDURE — 99213 OFFICE O/P EST LOW 20 MIN: CPT | Mod: S$GLB,,, | Performed by: INTERNAL MEDICINE

## 2018-02-11 PROCEDURE — 3008F BODY MASS INDEX DOCD: CPT | Mod: S$GLB,,, | Performed by: INTERNAL MEDICINE

## 2018-02-11 RX ORDER — ONDANSETRON 4 MG/1
4 TABLET, FILM COATED ORAL EVERY 8 HOURS PRN
Qty: 20 TABLET | Refills: 0 | Status: SHIPPED | OUTPATIENT
Start: 2018-02-11 | End: 2018-02-22

## 2018-02-11 RX ORDER — DIPHENOXYLATE HYDROCHLORIDE AND ATROPINE SULFATE 2.5; .025 MG/1; MG/1
TABLET ORAL
Qty: 20 TABLET | Refills: 0 | Status: SHIPPED | OUTPATIENT
Start: 2018-02-11 | End: 2018-11-30

## 2018-02-11 NOTE — PROGRESS NOTES
"Subjective:       Patient ID: Manisha Wynne is a 67 y.o. female.    Vitals:  height is 5' 8" (1.727 m) and weight is 86.2 kg (190 lb). Her oral temperature is 98.3 °F (36.8 °C). Her blood pressure is 159/86 (abnormal) and her pulse is 71. Her oxygen saturation is 97%.     Chief Complaint: Diarrhea    Pt flew home from [place] Friday. Pt felt like she was getting a cold on the plane ride home. As soon as she landed in the [place] pt started with diarrhea.       Diarrhea    This is a new problem. The current episode started yesterday. The problem occurs 5 to 10 times per day. The problem has been gradually worsening. The stool consistency is described as watery. The patient states that diarrhea does not awaken her from sleep. Associated symptoms include coughing. Pertinent negatives include no abdominal pain, chills, fever or vomiting. Nothing aggravates the symptoms. Risk factors include travel to endemic area.     Review of Systems   Constitution: Negative for chills and fever.   Cardiovascular: Negative for chest pain.   Respiratory: Positive for cough. Negative for shortness of breath.    Musculoskeletal: Negative for back pain.   Gastrointestinal: Negative for abdominal pain, constipation, diarrhea, hematochezia, melena, nausea and vomiting.   Genitourinary: Negative for dysuria.       Objective:      Physical Exam   Constitutional: She appears well-developed and well-nourished. She appears ill.   HENT:   Head: Normocephalic and atraumatic.   Eyes: Conjunctivae and EOM are normal. Pupils are equal, round, and reactive to light.   Neck: Normal range of motion. Neck supple.   Cardiovascular: Normal rate and regular rhythm.    Pulmonary/Chest: Effort normal and breath sounds normal.   Abdominal:   Mild generalized abd discomfort; no guarding or rebound   Vitals reviewed.      Assessment:       1. Gastroenteritis, acute        Plan:         Gastroenteritis, acute    Other orders  -     diphenoxylate-atropine " 2.5-0.025 mg (LOMOTIL) 2.5-0.025 mg per tablet; 1 po after each bowel movement not to exceed 8/24 hours  Dispense: 20 tablet; Refill: 0

## 2018-02-11 NOTE — LETTER
February 11, 2018      [facility]  [address] [place] [address]  Phone: [phone]  Fax: [phone]       Patient: Manisha Wynne   YOB: 1950  Date of Visit: 02/11/2018    To Whom It May Concern:    Nydia Wynne  was at [facility] on 02/11/2018. She may return to work/school on 2/14/2018 with no restrictions. If you have any questions or concerns, or if I can be of further assistance, please do not hesitate to contact me.    Sincerely,    Aayush Bundy MD

## 2018-02-12 ENCOUNTER — PATIENT MESSAGE (OUTPATIENT)
Dept: PSYCHIATRY | Facility: CLINIC | Age: 68
End: 2018-02-12

## 2018-02-12 ENCOUNTER — DOCUMENTATION ONLY (OUTPATIENT)
Dept: PSYCHIATRY | Facility: CLINIC | Age: 68
End: 2018-02-12

## 2018-02-19 ENCOUNTER — PATIENT MESSAGE (OUTPATIENT)
Dept: CARDIOLOGY | Facility: CLINIC | Age: 68
End: 2018-02-19

## 2018-02-19 ENCOUNTER — TELEPHONE (OUTPATIENT)
Dept: CARDIOLOGY | Facility: CLINIC | Age: 68
End: 2018-02-19

## 2018-02-19 DIAGNOSIS — I10 HYPERTENSION, ESSENTIAL: Primary | ICD-10-CM

## 2018-02-19 DIAGNOSIS — E78.2 MIXED HYPERLIPIDEMIA: ICD-10-CM

## 2018-02-22 ENCOUNTER — OFFICE VISIT (OUTPATIENT)
Dept: INTERNAL MEDICINE | Facility: CLINIC | Age: 68
End: 2018-02-22
Payer: MEDICARE

## 2018-02-22 VITALS
DIASTOLIC BLOOD PRESSURE: 65 MMHG | BODY MASS INDEX: 28.13 KG/M2 | SYSTOLIC BLOOD PRESSURE: 115 MMHG | HEIGHT: 68 IN | TEMPERATURE: 99 F | WEIGHT: 185.63 LBS

## 2018-02-22 DIAGNOSIS — Z95.0 CARDIAC PACEMAKER IN SITU: ICD-10-CM

## 2018-02-22 DIAGNOSIS — I10 ESSENTIAL HYPERTENSION: Primary | Chronic | ICD-10-CM

## 2018-02-22 DIAGNOSIS — Z12.31 SCREENING MAMMOGRAM, ENCOUNTER FOR: ICD-10-CM

## 2018-02-22 DIAGNOSIS — G47.33 OSA (OBSTRUCTIVE SLEEP APNEA): ICD-10-CM

## 2018-02-22 PROCEDURE — 99999 PR PBB SHADOW E&M-EST. PATIENT-LVL IV: CPT | Mod: PBBFAC,,, | Performed by: INTERNAL MEDICINE

## 2018-02-22 PROCEDURE — 99214 OFFICE O/P EST MOD 30 MIN: CPT | Mod: PBBFAC,PO | Performed by: INTERNAL MEDICINE

## 2018-02-22 PROCEDURE — 99213 OFFICE O/P EST LOW 20 MIN: CPT | Mod: S$PBB,,, | Performed by: INTERNAL MEDICINE

## 2018-02-26 ENCOUNTER — PATIENT MESSAGE (OUTPATIENT)
Dept: INTERNAL MEDICINE | Facility: CLINIC | Age: 68
End: 2018-02-26

## 2018-02-26 ENCOUNTER — OFFICE VISIT (OUTPATIENT)
Dept: PSYCHIATRY | Facility: CLINIC | Age: 68
End: 2018-02-26
Payer: COMMERCIAL

## 2018-02-26 DIAGNOSIS — F33.1 MAJOR DEPRESSIVE DISORDER, RECURRENT, MODERATE: Primary | ICD-10-CM

## 2018-02-26 DIAGNOSIS — F41.9 ANXIETY: ICD-10-CM

## 2018-02-26 DIAGNOSIS — F98.8 ATTENTION DEFICIT DISORDER (ADD) IN ADULT: ICD-10-CM

## 2018-02-26 PROCEDURE — 90834 PSYTX W PT 45 MINUTES: CPT | Mod: PBBFAC | Performed by: SOCIAL WORKER

## 2018-02-26 PROCEDURE — 90834 PSYTX W PT 45 MINUTES: CPT | Mod: S$PBB,,, | Performed by: SOCIAL WORKER

## 2018-02-26 NOTE — PROGRESS NOTES
Individual Psychotherapy (PhD/LCSW)    2/26/2018    Site: [facility]    Therapeutic Intervention: Met with patient alone.  Outpatient - Insight oriented psychotherapy 45 min - CPT code 91864    Chief complaint/reason for encounter: depression, anxiety, distractibility     Interval history and content of current session: Pt came back from [place] and had extreme difficulty getting herself to work, lost her job last week, is in shock, has not told friends. Pt denies suicidal/homicidal ideation, is tearful in session but notes she is not crying most of the time at home, is over-sleeping, eating ok. She does intend to tell friends, plans to contact long term division of [facility] and get help applying for social security and medicare and any pensions she is entitled to. Help pt shift from feeling like a failure to seeing that she has found a vocation that suits her, just burned out on keeping up with [facility] pace.  Pt plans to move to smaller apartment, consider finding part-time work. Suggest she consider this a new chapter in her life and look at new opportunities. She would like to come in weekly for now.    Treatment plan:  · Target symptoms: depression, anxiety, distractibility  · Why chosen therapy is appropriate versus another modality: relevant to diagnosis  · Outcome monitoring methods: self-report  · Therapeutic intervention type: insight oriented psychotherapy    Risk parameters:  Patient reports no suicidal ideation  Patient reports no homicidal ideation  Patient reports no self-injurious behavior  Patient reports no violent behavior    Verbal deficits: None    Patient's response to intervention:  The patient's response to intervention is motivated    Progress toward goals and other mental status changes:  The patient's progress toward goals is limited    Diagnosis: Major depression, recurrent, moderate, anxiety,  ADD in adult     Plan:  individual psychotherapy    Return to clinic: as scheduled

## 2018-02-27 NOTE — PROGRESS NOTES
Last 5 Patient Entered Readings                                      Current 30 Day Average: 115/65     Recent Readings 2/22/2018 2/22/2018 12/20/2017 12/20/2017 12/3/2017    SBP (mmHg) 115 115 149 149 136    DBP (mmHg) 65 65 90 90 81    Pulse 75 75 75 75 62        Readings transmitting. I will follow up in a few weeks to assess progress.

## 2018-02-28 ENCOUNTER — TELEPHONE (OUTPATIENT)
Dept: INTERNAL MEDICINE | Facility: CLINIC | Age: 68
End: 2018-02-28

## 2018-02-28 NOTE — PROGRESS NOTES
Subjective:       Patient ID: Manisha Wynne is a 67 y.o. female.    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  Has two cats that she loves that will limit her travel to a degree.  Review of Systems   Constitutional: Negative for activity change.   Eyes: Negative for discharge.   Respiratory: Negative for shortness of breath (PND or orthopnea) and wheezing.    Cardiovascular: Negative for chest pain and palpitations.   Gastrointestinal: Negative for abdominal pain, constipation, diarrhea, nausea and vomiting.   Genitourinary: Negative for difficulty urinating, dysuria and hematuria.   Neurological: Negative for seizures, syncope and headaches.   Psychiatric/Behavioral: Negative for dysphoric mood.       Objective:      Physical Exam   Constitutional: She is oriented to person, place, and time. She appears well-developed and well-nourished. No distress.   HENT:   Head: Normocephalic.   Mouth/Throat: Oropharynx is clear and moist.   Neck: Neck supple. No JVD present. No thyromegaly present.   Cardiovascular: Normal rate, regular rhythm, normal heart sounds and intact distal pulses.  Exam reveals no gallop and no friction rub.    No murmur heard.  Pulmonary/Chest: Effort normal and breath sounds normal. She has no wheezes. She has no rales.   Abdominal: Soft. Bowel sounds are normal. She exhibits no distension and no mass. There is no tenderness. There is no rebound and no guarding.   Musculoskeletal: She exhibits no edema.   Lymphadenopathy:     She has no cervical adenopathy.   Neurological: She is alert and oriented to person, place, and time. She has normal reflexes.   Skin: Skin is warm and dry.   Psychiatric: She has a normal mood and affect. Her behavior is normal. Judgment and thought content normal.       Assessment:       1. Essential hypertension    2. Cardiac pacemaker in situ    3. CEDRIC (obstructive sleep apnea)    4. Screening mammogram, encounter for         Plan:   Essential hypertension  Controlled - continue current meds    Cardiac pacemaker in situ  Per cardiology stable - no syncope - no malfunction  CEDRIC (obstructive sleep apnea)  Patient is using faithfully and derives great benefit from it    Screening mammogram, encounter for  -     Mammo Digital Screening Bilat with Tomosynthesis CAD; Future; Expected date: 02/22/2018    Other orders  -     varicella-zoster gE-AS01B, PF, (SHINGRIX, PF,) 50 mcg/0.5 mL injection; Inject 0.5 mLs into the muscle once.  Dispense: 0.5 mL; Refill: 0

## 2018-03-02 ENCOUNTER — PATIENT OUTREACH (OUTPATIENT)
Dept: OTHER | Facility: OTHER | Age: 68
End: 2018-03-02

## 2018-03-02 NOTE — LETTER
Gris Fuentes, PharmD  [address] [place] [address]     Dear Manisha,    Thank you for enrolling in the [facility]. To participate in our program, we ask that you submit a blood pressure reading at least once weekly through your [facility] Account and maintain regular contact with your Care Team.  We have not received any data or heard from you in some time.     The Digital Medicine Care Team has attempted to reach you on multiple occasions to determine if you would like to continue participating in the program. While we encourage you to continue participating fully, we understand that circumstances may change.      To continue participating in the program, please contact me at [phone]. If we do not hear back, you will be un-enrolled and your physician will be notified of your decision.    If you have submitted blood pressure readings during the past 62 days and believe you are receiving this letter in error, please call the Digital Medicine Patient Support Line at [phone] for troubleshooting.      We look forward to hearing from you soon.    Sincerely,     Gris Fuentes, PharmD  Your Personal Clinical Pharmacist                                                                                                                        Manisha Wynne  [address] [id number] [facility] [address]  [facility] [address]

## 2018-03-07 ENCOUNTER — OFFICE VISIT (OUTPATIENT)
Dept: PSYCHIATRY | Facility: CLINIC | Age: 68
End: 2018-03-07
Payer: MEDICARE

## 2018-03-07 DIAGNOSIS — F41.9 ANXIETY: ICD-10-CM

## 2018-03-07 DIAGNOSIS — F98.8 ATTENTION DEFICIT DISORDER (ADD) IN ADULT: ICD-10-CM

## 2018-03-07 DIAGNOSIS — F33.1 MAJOR DEPRESSIVE DISORDER, RECURRENT, MODERATE: Primary | ICD-10-CM

## 2018-03-07 PROCEDURE — 90834 PSYTX W PT 45 MINUTES: CPT | Mod: PBBFAC,PO | Performed by: SOCIAL WORKER

## 2018-03-07 PROCEDURE — 90834 PSYTX W PT 45 MINUTES: CPT | Mod: S$PBB,,, | Performed by: SOCIAL WORKER

## 2018-03-07 NOTE — PROGRESS NOTES
Individual Psychotherapy (PhD/LCSW)    3/7/2018    Site: [facility]    Therapeutic Intervention: Met with patient alone.  Outpatient - Insight oriented psychotherapy 45 min - CPT code 48284    Chief complaint/reason for encounter: depression, anxiety, distractibility     Interval history and content of current session: Pt overwhelmed and touched by support she is getting from friends and former co-workers, feels reassured that she will figure out her financial future. However her mind has been racing, she is rushing to do and find things, thinks she threw her wallet away, is having to replace her 's license and other contents. Talk to pt about slowing down and allowing herself to focus, taking in the positive regard that is being offered to help correct negative childhood messages. Pt initially avoided friends and was not returning calls, but overcame that and is now reaching out to her support system.    Treatment plan:  · Target symptoms: depression, anxiety, distractibility  · Why chosen therapy is appropriate versus another modality: relevant to diagnosis  · Outcome monitoring methods: self-report  · Therapeutic intervention type: insight oriented psychotherapy    Risk parameters:  Patient reports no suicidal ideation  Patient reports no homicidal ideation  Patient reports no self-injurious behavior  Patient reports no violent behavior    Verbal deficits: None    Patient's response to intervention:  The patient's response to intervention is motivated    Progress toward goals and other mental status changes:  The patient's progress toward goals is fair    Diagnosis: Major depression, recurrent, moderate, anxiety,  ADD in adult     Plan:  individual psychotherapy    Return to clinic: as scheduled

## 2018-03-12 ENCOUNTER — OFFICE VISIT (OUTPATIENT)
Dept: PSYCHIATRY | Facility: CLINIC | Age: 68
End: 2018-03-12
Payer: COMMERCIAL

## 2018-03-12 DIAGNOSIS — F41.9 ANXIETY: ICD-10-CM

## 2018-03-12 DIAGNOSIS — F98.8 ATTENTION DEFICIT DISORDER (ADD) IN ADULT: ICD-10-CM

## 2018-03-12 DIAGNOSIS — F33.41 MAJOR DEPRESSIVE DISORDER, RECURRENT EPISODE, IN PARTIAL REMISSION: Primary | ICD-10-CM

## 2018-03-12 PROCEDURE — 90834 PSYTX W PT 45 MINUTES: CPT | Mod: PBBFAC | Performed by: SOCIAL WORKER

## 2018-03-12 PROCEDURE — 90834 PSYTX W PT 45 MINUTES: CPT | Mod: S$GLB,,, | Performed by: SOCIAL WORKER

## 2018-03-12 NOTE — PROGRESS NOTES
Individual Psychotherapy (PhD/LCSW)    3/12/2018    Site: [facility]    Therapeutic Intervention: Met with patient alone.  Outpatient - Insight oriented psychotherapy 45 min - CPT code 40607    Chief complaint/reason for encounter: depression, anxiety, distractibility     Interval history and content of current session: Pt moving forward with applying for unemployment and social security, sending financial info to friends who want to help. She notes she is sleeping poorly, has not been using her C-PAP, and forgets to eat. Pt agrees to clean her machine today so she can start using it, and to set alarms for meals. She is keeping task lists and using those regularly, and staying in touch with friends. Discuss mix of scheduled tasks and scheduled down time, better bedtime routine.    Treatment plan:  · Target symptoms: depression, anxiety, distractibility  · Why chosen therapy is appropriate versus another modality: relevant to diagnosis  · Outcome monitoring methods: self-report  · Therapeutic intervention type: insight oriented psychotherapy    Risk parameters:  Patient reports no suicidal ideation  Patient reports no homicidal ideation  Patient reports no self-injurious behavior  Patient reports no violent behavior    Verbal deficits: None    Patient's response to intervention:  The patient's response to intervention is motivated    Progress toward goals and other mental status changes:  The patient's progress toward goals is fair    Diagnosis: Major depression, recurrent, partial remission, anxiety,  ADD in adult     Plan:  individual psychotherapy    Return to clinic: as scheduled

## 2018-03-29 ENCOUNTER — OFFICE VISIT (OUTPATIENT)
Dept: PSYCHIATRY | Facility: CLINIC | Age: 68
End: 2018-03-29
Payer: COMMERCIAL

## 2018-03-29 DIAGNOSIS — F98.8 ATTENTION DEFICIT DISORDER (ADD) IN ADULT: ICD-10-CM

## 2018-03-29 DIAGNOSIS — F33.1 MAJOR DEPRESSIVE DISORDER, RECURRENT, MODERATE: Primary | ICD-10-CM

## 2018-03-29 DIAGNOSIS — F41.9 ANXIETY: ICD-10-CM

## 2018-03-29 PROCEDURE — 90834 PSYTX W PT 45 MINUTES: CPT | Mod: S$GLB,,, | Performed by: SOCIAL WORKER

## 2018-03-29 PROCEDURE — 90834 PSYTX W PT 45 MINUTES: CPT | Mod: PBBFAC,PO | Performed by: SOCIAL WORKER

## 2018-03-29 NOTE — PROGRESS NOTES
Individual Psychotherapy (PhD/LCSW)    3/29/2018    Site: [facility]    Therapeutic Intervention: Met with patient alone.  Outpatient - Insight oriented psychotherapy 45 min - CPT code 41105    Chief complaint/reason for encounter: depression, anxiety, distractibility     Interval history and content of current session: Pt struggling with feelings of shame and anger toward self for not being able to manage her life, feels powerless to direct herself ie to get up out of bed. This is longstanding problem based in learned oppositional behavior to mother with bipolar disorder and dad's insistence that pt placate mother and not talk about mom's problems. Use EMDR to have pt picture the child self she is so angry at. Pt begins to see child screaming at mother wanting matching socks and feeling unheard, helpless and then guilty for upsetting mom. Introduce image of train populated with loving people who are in pt's current life. Process with pt, who feels no benefit at this point but is willing to continue the process.    Pt has gotten some paperwork done with social security and medicare, still working on getting supplemental medicare policy and locating drug store where she can get affordable meds.    Treatment plan:  · Target symptoms: depression, anxiety, distractibility  · Why chosen therapy is appropriate versus another modality: relevant to diagnosis  · Outcome monitoring methods: self-report  · Therapeutic intervention type: insight oriented psychotherapy    Risk parameters:  Patient reports no suicidal ideation  Patient reports no homicidal ideation  Patient reports no self-injurious behavior  Patient reports no violent behavior    Verbal deficits: None    Patient's response to intervention:  The patient's response to intervention is motivated    Progress toward goals and other mental status changes:  The patient's progress toward goals is limited.    Diagnosis: Major depression, recurrent, moderate,  anxiety,  ADD in adult     Plan:  individual psychotherapy    Return to clinic: as scheduled

## 2018-04-09 ENCOUNTER — OFFICE VISIT (OUTPATIENT)
Dept: PSYCHIATRY | Facility: CLINIC | Age: 68
End: 2018-04-09
Payer: MEDICARE

## 2018-04-09 DIAGNOSIS — F98.8 ATTENTION DEFICIT DISORDER (ADD) IN ADULT: ICD-10-CM

## 2018-04-09 DIAGNOSIS — F33.41 MAJOR DEPRESSIVE DISORDER, RECURRENT EPISODE, IN PARTIAL REMISSION: Primary | ICD-10-CM

## 2018-04-09 DIAGNOSIS — F41.9 ANXIETY: ICD-10-CM

## 2018-04-09 PROCEDURE — 90834 PSYTX W PT 45 MINUTES: CPT | Mod: S$GLB,,, | Performed by: SOCIAL WORKER

## 2018-04-09 PROCEDURE — 90834 PSYTX W PT 45 MINUTES: CPT | Mod: PBBFAC | Performed by: SOCIAL WORKER

## 2018-04-09 NOTE — PROGRESS NOTES
"Individual Psychotherapy (PhD/LCSW)    4/9/2018    Site: [facility]    Therapeutic Intervention: Met with patient alone.  Outpatient - Insight oriented psychotherapy 45 min - CPT code 59184    Chief complaint/reason for encounter: depression, anxiety, distractibility     Interval history and content of current session: Pt still spending a lot of time in bed and procrastinating, has had some social contact, did find affordable medications. Encourage pt to attend to inner struggle between "I have to " and "I don't want to" based in childhood struggles with mother. Pt learning to focus on dania and "I want this for myself", building sense of autonomy and desire for good self-care. Pt in touch with friends and former co-workers, gradually checking things off her to-do list and working on de-cluttering.    Treatment plan:  · Target symptoms: depression, anxiety, distractibility  · Why chosen therapy is appropriate versus another modality: relevant to diagnosis  · Outcome monitoring methods: self-report  · Therapeutic intervention type: insight oriented psychotherapy    Risk parameters:  Patient reports no suicidal ideation  Patient reports no homicidal ideation  Patient reports no self-injurious behavior  Patient reports no violent behavior    Verbal deficits: None    Patient's response to intervention:  The patient's response to intervention is motivated    Progress toward goals and other mental status changes:  The patient's progress toward goals is fair.    Diagnosis: Major depression, recurrent, in partial remission, anxiety,  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

## 2018-04-23 ENCOUNTER — OFFICE VISIT (OUTPATIENT)
Dept: PSYCHIATRY | Facility: CLINIC | Age: 68
End: 2018-04-23
Payer: COMMERCIAL

## 2018-04-23 DIAGNOSIS — F41.9 ANXIETY: ICD-10-CM

## 2018-04-23 DIAGNOSIS — F98.8 ATTENTION DEFICIT DISORDER (ADD) IN ADULT: ICD-10-CM

## 2018-04-23 DIAGNOSIS — F33.41 MAJOR DEPRESSIVE DISORDER, RECURRENT EPISODE, IN PARTIAL REMISSION: Primary | ICD-10-CM

## 2018-04-23 PROCEDURE — 90834 PSYTX W PT 45 MINUTES: CPT | Mod: S$GLB,,, | Performed by: SOCIAL WORKER

## 2018-04-23 PROCEDURE — 90834 PSYTX W PT 45 MINUTES: CPT | Mod: PBBFAC | Performed by: SOCIAL WORKER

## 2018-04-23 NOTE — PROGRESS NOTES
Individual Psychotherapy (PhD/LCSW)    4/23/2018    Site: [facility]    Therapeutic Intervention: Met with patient alone.  Outpatient - Insight oriented psychotherapy 45 min - CPT code 37397    Chief complaint/reason for encounter: depression, anxiety, distractibility     Interval history and content of current session: Pt just back from weekend with friends in [place], hiked and enjoyed socializing, mood is bright. She did meet with [employer] and needs to meet with 2 others before choosing insurance plan. Discuss urgency of resolving this. 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 Pt 15 minutes late. Discuss likely bill for my services as she is currently unemployed, and pt will consider whether to keep her recurring appointment until she has insurance.    Treatment plan:  · Target symptoms: depression, anxiety, distractibility  · Why chosen therapy is appropriate versus another modality: relevant to diagnosis  · Outcome monitoring methods: self-report  · Therapeutic intervention type: insight oriented psychotherapy    Risk parameters:  Patient reports no suicidal ideation  Patient reports no homicidal ideation  Patient reports no self-injurious behavior  Patient reports no violent behavior    Verbal deficits: None    Patient's response to intervention:  The patient's response to intervention is motivated    Progress toward goals and other mental status changes:  The patient's progress toward goals is good.    Diagnosis: Major depression, recurrent, in partial remission, anxiety,  ADD in adult     Plan:  individual psychotherapy    Return to clinic: as scheduled

## 2018-04-25 ENCOUNTER — CLINICAL SUPPORT (OUTPATIENT)
Dept: ELECTROPHYSIOLOGY | Facility: CLINIC | Age: 68
End: 2018-04-25
Payer: MEDICARE

## 2018-04-25 DIAGNOSIS — I49.5 SSS (SICK SINUS SYNDROME): Chronic | ICD-10-CM

## 2018-04-25 DIAGNOSIS — I44.0 AV BLOCK, 1ST DEGREE: Chronic | ICD-10-CM

## 2018-04-25 DIAGNOSIS — Z95.0 CARDIAC PACEMAKER IN SITU: Primary | ICD-10-CM

## 2018-04-25 PROCEDURE — 93293 PM PHONE R-STRIP DEVICE EVAL: CPT | Mod: PBBFAC | Performed by: INTERNAL MEDICINE

## 2018-05-07 ENCOUNTER — DOCUMENTATION ONLY (OUTPATIENT)
Dept: PSYCHIATRY | Facility: CLINIC | Age: 68
End: 2018-05-07

## 2018-05-07 NOTE — PROGRESS NOTES
Pt e-mails at time of appointment, woke up late, took meds for allergy last night, can't get here on time. Pt agrees 8am is not a good time for her appointments, will call Carin and find a better time slot. Pt states she is generally functioning well, in no current distress.

## 2018-05-23 ENCOUNTER — PATIENT MESSAGE (OUTPATIENT)
Dept: CARDIOLOGY | Facility: CLINIC | Age: 68
End: 2018-05-23

## 2018-05-23 DIAGNOSIS — I10 HYPERTENSION, ESSENTIAL: ICD-10-CM

## 2018-05-24 ENCOUNTER — PATIENT OUTREACH (OUTPATIENT)
Dept: OTHER | Facility: OTHER | Age: 68
End: 2018-05-24

## 2018-05-24 RX ORDER — METOPROLOL TARTRATE 25 MG/1
25 TABLET, FILM COATED ORAL 2 TIMES DAILY
Qty: 180 TABLET | Refills: 3 | Status: SHIPPED | OUTPATIENT
Start: 2018-05-24 | End: 2018-08-07 | Stop reason: SDUPTHER

## 2018-05-24 NOTE — LETTER
Jenifer Burris  [address] [place] [address]     Dear Manisha,    Thank you for enrolling in the [facility]. To participate in our program, we ask that you submit a blood pressure reading at least once weekly through your [facility] Account and maintain regular contact with your Care Team.  We have not received any data or heard from you in some time.     The Digital Medicine Care Team has attempted to reach you on multiple occasions to determine if you would like to continue participating in the program. While we encourage you to continue participating fully, we understand that circumstances may change.      To continue participating in the program, please contact me at [phone]. If we do not hear back, you will be un-enrolled and your physician will be notified of your decision.    If you have submitted blood pressure readings during the past 91 days and believe you are receiving this letter in error, please call the Digital Medicine Patient Support Line at [phone] for troubleshooting.      We look forward to hearing from you soon.    Sincerely,     Jenifer Burris MA, CHES  [facility]                                                                                                                         Manisha Wynne  [id number] [address]  [place] [address]

## 2018-05-28 ENCOUNTER — LAB VISIT (OUTPATIENT)
Dept: LAB | Facility: HOSPITAL | Age: 68
End: 2018-05-28
Attending: INTERNAL MEDICINE
Payer: MEDICARE

## 2018-05-28 DIAGNOSIS — I10 HYPERTENSION, ESSENTIAL: ICD-10-CM

## 2018-05-28 DIAGNOSIS — E78.2 MIXED HYPERLIPIDEMIA: ICD-10-CM

## 2018-05-28 LAB
ALBUMIN SERPL BCP-MCNC: 3.9 G/DL
ALP SERPL-CCNC: 95 U/L
ALT SERPL W/O P-5'-P-CCNC: 27 U/L
ANION GAP SERPL CALC-SCNC: 6 MMOL/L
AST SERPL-CCNC: 23 U/L
BILIRUB SERPL-MCNC: 0.8 MG/DL
BUN SERPL-MCNC: 22 MG/DL
CALCIUM SERPL-MCNC: 10.1 MG/DL
CHLORIDE SERPL-SCNC: 108 MMOL/L
CHOLEST SERPL-MCNC: 213 MG/DL
CHOLEST/HDLC SERPL: 3.4 {RATIO}
CO2 SERPL-SCNC: 27 MMOL/L
CREAT SERPL-MCNC: 1.1 MG/DL
EST. GFR  (AFRICAN AMERICAN): 59.2 ML/MIN/1.73 M^2
EST. GFR  (NON AFRICAN AMERICAN): 51.3 ML/MIN/1.73 M^2
GLUCOSE SERPL-MCNC: 99 MG/DL
HDLC SERPL-MCNC: 63 MG/DL
HDLC SERPL: 29.6 %
LDLC SERPL CALC-MCNC: 128.6 MG/DL
NONHDLC SERPL-MCNC: 150 MG/DL
POTASSIUM SERPL-SCNC: 4.5 MMOL/L
PROT SERPL-MCNC: 7.2 G/DL
SODIUM SERPL-SCNC: 141 MMOL/L
TRIGL SERPL-MCNC: 107 MG/DL
TSH SERPL DL<=0.005 MIU/L-ACNC: 1.36 UIU/ML

## 2018-05-28 PROCEDURE — 36415 COLL VENOUS BLD VENIPUNCTURE: CPT | Mod: PO

## 2018-05-28 PROCEDURE — 80053 COMPREHEN METABOLIC PANEL: CPT

## 2018-05-28 PROCEDURE — 84443 ASSAY THYROID STIM HORMONE: CPT

## 2018-05-28 PROCEDURE — 80061 LIPID PANEL: CPT

## 2018-05-29 ENCOUNTER — TELEPHONE (OUTPATIENT)
Dept: CARDIOLOGY | Facility: CLINIC | Age: 68
End: 2018-05-29

## 2018-05-29 ENCOUNTER — OFFICE VISIT (OUTPATIENT)
Dept: PSYCHIATRY | Facility: CLINIC | Age: 68
End: 2018-05-29
Payer: MEDICARE

## 2018-05-29 DIAGNOSIS — F98.8 ATTENTION DEFICIT DISORDER (ADD) IN ADULT: ICD-10-CM

## 2018-05-29 DIAGNOSIS — F33.1 MAJOR DEPRESSIVE DISORDER, RECURRENT, MODERATE: Primary | ICD-10-CM

## 2018-05-29 PROCEDURE — 90834 PSYTX W PT 45 MINUTES: CPT | Mod: S$PBB,,, | Performed by: SOCIAL WORKER

## 2018-05-29 PROCEDURE — 90834 PSYTX W PT 45 MINUTES: CPT | Mod: PBBFAC | Performed by: SOCIAL WORKER

## 2018-05-29 NOTE — PROGRESS NOTES
Individual Psychotherapy (PhD/LCSW)    5/29/2018    Site: [facility]    Therapeutic Intervention: Met with patient alone.  Outpatient - Insight oriented psychotherapy 45 min - CPT code 94929    Chief complaint/reason for encounter: depression, anxiety, distractibility     Interval history and content of current session: Pt reports severe depression since return from [place], not reading her e-mails, very little socializing, spending a lot of time in bed. Invite pt to consider BMU. Pt has not yet selected Medicare supplement plan. Talk with pt about reaching out to friends and identify people who can help walk her through checking e-mail and straightening up her house. Pt is tearful, angry at herself, not suicidal.    Treatment plan:  · Target symptoms: depression, anxiety, distractibility  · Why chosen therapy is appropriate versus another modality: relevant to diagnosis  · Outcome monitoring methods: self-report  · Therapeutic intervention type: insight oriented psychotherapy    Risk parameters:  Patient reports no suicidal ideation  Patient reports no homicidal ideation  Patient reports no self-injurious behavior  Patient reports no violent behavior    Verbal deficits: None    Patient's response to intervention:  The patient's response to intervention is passive    Progress toward goals and other mental status changes:  The patient's progress toward goals is poor    Diagnosis: Major depression, moderate, anxiety,  ADD in adult     Plan:  individual psychotherapy    Return to clinic: as scheduled

## 2018-05-29 NOTE — TELEPHONE ENCOUNTER
----- Message from Natali Comer MD sent at 5/29/2018 10:59 AM CDT -----  Please review.  We will discuss the results during your upcoming visit with me. It looks good.

## 2018-06-04 ENCOUNTER — OFFICE VISIT (OUTPATIENT)
Dept: CARDIOLOGY | Facility: CLINIC | Age: 68
End: 2018-06-04
Payer: MEDICARE

## 2018-06-04 VITALS
SYSTOLIC BLOOD PRESSURE: 126 MMHG | DIASTOLIC BLOOD PRESSURE: 82 MMHG | HEART RATE: 84 BPM | BODY MASS INDEX: 26.51 KG/M2 | HEIGHT: 69 IN | WEIGHT: 179 LBS

## 2018-06-04 DIAGNOSIS — E78.2 MIXED HYPERLIPIDEMIA: Chronic | ICD-10-CM

## 2018-06-04 DIAGNOSIS — I44.0 AV BLOCK, 1ST DEGREE: Chronic | ICD-10-CM

## 2018-06-04 DIAGNOSIS — F32.A DEPRESSION, UNSPECIFIED DEPRESSION TYPE: ICD-10-CM

## 2018-06-04 DIAGNOSIS — Z95.0 CARDIAC PACEMAKER IN SITU: ICD-10-CM

## 2018-06-04 DIAGNOSIS — G47.33 OSA (OBSTRUCTIVE SLEEP APNEA): ICD-10-CM

## 2018-06-04 DIAGNOSIS — I10 ESSENTIAL HYPERTENSION: Primary | Chronic | ICD-10-CM

## 2018-06-04 DIAGNOSIS — I49.5 SSS (SICK SINUS SYNDROME): Chronic | ICD-10-CM

## 2018-06-04 PROCEDURE — 99213 OFFICE O/P EST LOW 20 MIN: CPT | Mod: PBBFAC,PO | Performed by: INTERNAL MEDICINE

## 2018-06-04 PROCEDURE — 99213 OFFICE O/P EST LOW 20 MIN: CPT | Mod: S$PBB,,, | Performed by: INTERNAL MEDICINE

## 2018-06-04 PROCEDURE — 99999 PR PBB SHADOW E&M-EST. PATIENT-LVL III: CPT | Mod: PBBFAC,,, | Performed by: INTERNAL MEDICINE

## 2018-06-04 NOTE — PROGRESS NOTES
"Subjective:   Patient ID:  Manisha Wynne is a 67 y.o. female who presents for follow-up of Hypertension      HPI: Patient is here for follow-up of elevated blood pressure. She is not exercising , but is trying to stay adherent to a low-salt diet. Blood pressure is well controlled at home. Patient denies chest pain, dyspnea, palpitations, lower extremity edema.  She stays active.        Lab Results   Component Value Date     05/28/2018    K 4.5 05/28/2018     05/28/2018    CO2 27 05/28/2018    BUN 22 05/28/2018    CREATININE 1.1 05/28/2018    GLU 99 05/28/2018    HGBA1C 5.4 08/29/2017    MG 2.5 09/07/2012    AST 23 05/28/2018    ALT 27 05/28/2018    ALBUMIN 3.9 05/28/2018    PROT 7.2 05/28/2018    BILITOT 0.8 05/28/2018    WBC 7.31 08/29/2017    HGB 11.8 (L) 08/29/2017    HCT 34.5 (L) 08/29/2017    MCV 88 08/29/2017     08/29/2017    INR 1.1 09/06/2012    TSH 1.365 05/28/2018    CHOL 213 (H) 05/28/2018    HDL 63 05/28/2018    LDLCALC 128.6 05/28/2018    TRIG 107 05/28/2018       Review of Systems   Constitution: Positive for malaise/fatigue and weight gain.   HENT: Negative.    Eyes: Negative.    Cardiovascular: Negative.  Negative for chest pain, claudication, dyspnea on exertion, irregular heartbeat, leg swelling, near-syncope, palpitations and syncope.   Respiratory: Negative.  Negative for cough, shortness of breath, snoring and wheezing.    Endocrine: Negative.  Negative for cold intolerance, heat intolerance, polydipsia, polyphagia and polyuria.   Skin: Negative.    Musculoskeletal: Positive for back pain, falls and muscle cramps.   Gastrointestinal: Negative.    Genitourinary: Negative.    Neurological: Positive for loss of balance.   Psychiatric/Behavioral: Positive for depression.       Objective:   Physical Exam   Constitutional: She is oriented to person, place, and time. She appears well-developed and well-nourished.   /82   Pulse 84   Ht 5' 8.5" (1.74 m)   Wt 81.2 kg " (179 lb 0.2 oz)   LMP 10/01/2003   BMI 26.82 kg/m²      HENT:   Head: Normocephalic.   Eyes: Pupils are equal, round, and reactive to light.   Neck: Normal range of motion. Neck supple. Carotid bruit is not present. No thyromegaly present.   Cardiovascular: Normal rate, regular rhythm, normal heart sounds and intact distal pulses.  Exam reveals no gallop and no friction rub.    No murmur heard.  Pulses:       Carotid pulses are 2+ on the right side, and 2+ on the left side.       Radial pulses are 2+ on the right side, and 2+ on the left side.        Femoral pulses are 2+ on the right side, and 2+ on the left side.       Popliteal pulses are 2+ on the right side, and 2+ on the left side.        Dorsalis pedis pulses are 2+ on the right side, and 2+ on the left side.        Posterior tibial pulses are 2+ on the right side, and 2+ on the left side.   Pulmonary/Chest: Effort normal and breath sounds normal. No respiratory distress. She has no wheezes. She has no rales. She exhibits no tenderness.   Abdominal: Soft. Bowel sounds are normal.   Musculoskeletal: Normal range of motion. She exhibits no edema.   Neurological: She is alert and oriented to person, place, and time.   Skin: Skin is warm and dry.   Psychiatric: She has a normal mood and affect.   Nursing note and vitals reviewed.        Assessment and Plan:     Problem List Items Addressed This Visit        Cardiology Problems    Hypertension - Primary (Chronic)    Hyperlipidemia (Chronic)    SSS (sick sinus syndrome) (Chronic)    AV block, 1st degree (Chronic)       Other    Cardiac pacemaker in situ    Depression    CEDRIC (obstructive sleep apnea)          Patient's Medications   New Prescriptions    No medications on file   Previous Medications    ASPIRIN (ECOTRIN) 81 MG EC TABLET    Take 81 mg by mouth. 1 Tablet, Delayed Release (E.C.) Oral Every day    BUPROPION (WELLBUTRIN XL) 300 MG 24 HR TABLET    Take 300 mg by mouth once daily.    CALCIUM-VITAMIN D 600  MG(1,500MG) -200 UNIT TAB    Take 1 tablet by mouth once daily. 2 Tablets Oral Every day    CHLORHEXIDINE (PERIDEX) 0.12 % SOLUTION    Use as directed 15 mLs in the mouth or throat 2 (two) times daily.     DICYCLOMINE (BENTYL) 20 MG TABLET    Take 1 tablet (20 mg total) by mouth 3 (three) times daily as needed.    DIPHENOXYLATE-ATROPINE 2.5-0.025 MG (LOMOTIL) 2.5-0.025 MG PER TABLET    1 po after each bowel movement not to exceed 8/24 hours    DULOXETINE (CYMBALTA) 30 MG CAPSULE    take 1 capsule by mouth once daily    DULOXETINE (CYMBALTA) 30 MG CAPSULE    Take 30 mg by mouth once daily.     LISDEXAMFETAMINE (VYVANSE) 70 MG CAPSULE    Take 70 mg by mouth every morning.    LISINOPRIL (PRINIVIL,ZESTRIL) 20 MG TABLET    Take 1 tablet (20 mg total) by mouth once daily.    METOPROLOL TARTRATE (LOPRESSOR) 25 MG TABLET    Take 1 tablet (25 mg total) by mouth 2 (two) times daily.    MULTIVITAMIN-MINERALS-LUTEIN (CENTRUM SILVER) TAB    Take by mouth. 1 Tablet Oral Every day    PRAVASTATIN (PRAVACHOL) 10 MG TABLET    Take 1 tablet (10 mg total) by mouth once daily.    VITAMIN C-BIOTIN (HAIR-SKIN-NAILS, VIT C-BIOTIN,) 50 MG -1,250 MCG CHEW    Take by mouth once daily.   Modified Medications    No medications on file   Discontinued Medications    No medications on file     Doing well.  Continue on the present regimen.  Will follow in 6 months to interspace between me and Dr. Ervin.  Follow-up in about 6 months (around 12/4/2018).

## 2018-06-11 ENCOUNTER — OFFICE VISIT (OUTPATIENT)
Dept: PSYCHIATRY | Facility: CLINIC | Age: 68
End: 2018-06-11
Payer: MEDICARE

## 2018-06-11 DIAGNOSIS — F33.1 MAJOR DEPRESSIVE DISORDER, RECURRENT, MODERATE: Primary | ICD-10-CM

## 2018-06-11 DIAGNOSIS — F41.9 ANXIETY: ICD-10-CM

## 2018-06-11 DIAGNOSIS — F98.8 ATTENTION DEFICIT DISORDER (ADD) IN ADULT: ICD-10-CM

## 2018-06-11 PROCEDURE — 90834 PSYTX W PT 45 MINUTES: CPT | Mod: PBBFAC | Performed by: SOCIAL WORKER

## 2018-06-11 PROCEDURE — 90834 PSYTX W PT 45 MINUTES: CPT | Mod: S$PBB,,, | Performed by: SOCIAL WORKER

## 2018-06-11 NOTE — PROGRESS NOTES
"Individual Psychotherapy (PhD/LCSW)    6/11/2018    Site: [facility]    Therapeutic Intervention: Met with patient alone.  Outpatient - Insight oriented psychotherapy 45 min - CPT code 11436    Chief complaint/reason for encounter: depression, anxiety, distractibility     Interval history and content of current session: Pt overwhelmed, over-sleeping, getting nothing done and angry at herself about it. Pt links tasks together until they are overwhelming, discuss breaking them down into doable bits. Suggest she sign up for exercise classes at [facility] since she feels better when she exercises, needs social contact, and also needs something to structure her day around. Pt aware of split in self between desire to do responsible behaviors and strong resistance to self-care, wish for someone to listen and take care of her, related to lack of parenting in childhood. Encourage pt to work with "child" self to self-nurture and encourage shift in attitude toward self-care. Confront wish for do-over and encourage focus on what is possible now.    Treatment plan:  · Target symptoms: depression, anxiety, distractibility  · Why chosen therapy is appropriate versus another modality: relevant to diagnosis  · Outcome monitoring methods: self-report  · Therapeutic intervention type: insight oriented psychotherapy    Risk parameters:  Patient reports no suicidal ideation  Patient reports no homicidal ideation  Patient reports no self-injurious behavior  Patient reports no violent behavior    Verbal deficits: None    Patient's response to intervention:  The patient's response to intervention is passive    Progress toward goals and other mental status changes:  The patient's progress toward goals is poor    Diagnosis: Major depression, moderate, anxiety,  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

## 2018-06-20 ENCOUNTER — PATIENT OUTREACH (OUTPATIENT)
Dept: OTHER | Facility: OTHER | Age: 68
End: 2018-06-20

## 2018-06-20 NOTE — PROGRESS NOTES
Last 5 Patient Entered Readings                                      Current 30 Day Average:      Recent Readings 2/22/2018 2/22/2018 12/20/2017 12/20/2017 12/3/2017    SBP (mmHg) 115 115 149 149 136    DBP (mmHg) 65 65 90 90 81    Pulse 75 75 75 75 62        Sending patient MyChart message regarding lack of BP readings.  Will follow up in 1-2 weeks.

## 2018-06-25 ENCOUNTER — OFFICE VISIT (OUTPATIENT)
Dept: PSYCHIATRY | Facility: CLINIC | Age: 68
End: 2018-06-25
Payer: MEDICARE

## 2018-06-25 DIAGNOSIS — F41.9 ANXIETY: ICD-10-CM

## 2018-06-25 DIAGNOSIS — F33.41 MAJOR DEPRESSIVE DISORDER, RECURRENT EPISODE, IN PARTIAL REMISSION: Primary | ICD-10-CM

## 2018-06-25 DIAGNOSIS — F98.8 ATTENTION DEFICIT DISORDER (ADD) IN ADULT: ICD-10-CM

## 2018-06-25 PROCEDURE — 90834 PSYTX W PT 45 MINUTES: CPT | Mod: PBBFAC | Performed by: SOCIAL WORKER

## 2018-06-25 PROCEDURE — 90834 PSYTX W PT 45 MINUTES: CPT | Mod: S$PBB,,, | Performed by: SOCIAL WORKER

## 2018-06-25 NOTE — PROGRESS NOTES
Individual Psychotherapy (PhD/LCSW)    6/25/2018    Site: [facility]    Therapeutic Intervention: Met with patient alone.  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 She wrote a new resume, and wrote info for a friend who is going to help her mitigate her student loans. She is getting up earlier several days each week. Pt is surprised as she enumerates the progress she is making, had thought her depression was unchanged. She still needs to get health insurance and clear her bed. Focus on positive self-talk and building her new sense of control and confidence in herself.    Treatment plan:  · Target symptoms: depression, anxiety, distractibility  · Why chosen therapy is appropriate versus another modality: relevant to diagnosis  · Outcome monitoring methods: self-report  · Therapeutic intervention type: insight oriented psychotherapy    Risk parameters:  Patient reports no suicidal ideation  Patient reports no homicidal ideation  Patient reports no self-injurious behavior  Patient reports no violent behavior    Verbal deficits: None    Patient's response to intervention:  The patient's response to intervention is motivated    Progress toward goals and other mental status changes:  The patient's progress toward goals is good    Diagnosis: Major depression, in partial remission, anxiety,  ADD in adult     Plan:  individual psychotherapy    Return to clinic: as scheduled

## 2018-07-03 NOTE — PROGRESS NOTES
Last 5 Patient Entered Readings                                      Current 30 Day Average:      Recent Readings 2/22/2018 2/22/2018 12/20/2017 12/20/2017 12/3/2017    SBP (mmHg) 115 115 149 149 136    DBP (mmHg) 65 65 90 90 81    Pulse 75 75 75 75 62        Digital Medicine: Health  Follow Up    Left voicemail to follow up with Ms. Manisha Wynne.  Still no readings since 2/22/18. Inquired about participation.  Will follow up in 2 weeks.

## 2018-07-11 ENCOUNTER — PATIENT MESSAGE (OUTPATIENT)
Dept: ELECTROPHYSIOLOGY | Facility: CLINIC | Age: 68
End: 2018-07-11

## 2018-07-11 ENCOUNTER — PATIENT MESSAGE (OUTPATIENT)
Dept: INTERNAL MEDICINE | Facility: CLINIC | Age: 68
End: 2018-07-11

## 2018-07-16 ENCOUNTER — OFFICE VISIT (OUTPATIENT)
Dept: PSYCHIATRY | Facility: CLINIC | Age: 68
End: 2018-07-16
Payer: MEDICARE

## 2018-07-16 DIAGNOSIS — F33.41 MAJOR DEPRESSIVE DISORDER, RECURRENT EPISODE, IN PARTIAL REMISSION: Primary | ICD-10-CM

## 2018-07-16 DIAGNOSIS — F98.8 ATTENTION DEFICIT DISORDER (ADD) IN ADULT: ICD-10-CM

## 2018-07-16 PROCEDURE — 90834 PSYTX W PT 45 MINUTES: CPT | Mod: PBBFAC | Performed by: SOCIAL WORKER

## 2018-07-16 PROCEDURE — 90834 PSYTX W PT 45 MINUTES: CPT | Mod: S$PBB,,, | Performed by: SOCIAL WORKER

## 2018-07-16 NOTE — PROGRESS NOTES
Individual Psychotherapy (PhD/LCSW)    7/16/2018    Site: [facility]    Therapeutic Intervention: Met with patient alone.  Outpatient - Insight oriented psychotherapy 45 min - CPT code 24543    Chief complaint/reason for encounter: depression, anxiety, distractibility     Interval history and content of current session: Pt doing very well, doctor tapering her off Cymbalta and started Prozac. Feels activated, got kitchen floor clean, called friend in [place] and asked her to call pt daily, which helps her get up, keeping up with dishes, keeping a log of accomplishments, got her health insurance, making plans to travel to [place] for holidays, in touch with friends. PT feeling empowered and optimistic, although avoiding looking clearly at finances.    Treatment plan:  · Target symptoms: depression, anxiety, distractibility  · Why chosen therapy is appropriate versus another modality: relevant to diagnosis  · Outcome monitoring methods: self-report  · Therapeutic intervention type: insight oriented psychotherapy    Risk parameters:  Patient reports no suicidal ideation  Patient reports no homicidal ideation  Patient reports no self-injurious behavior  Patient reports no violent behavior    Verbal deficits: None    Patient's response to intervention:  The patient's response to intervention is motivated    Progress toward goals and other mental status changes:  The patient's progress toward goals is good    Diagnosis: Major depression, in partial remission, anxiety,  ADD in adult     Plan:  individual psychotherapy    Return to clinic: as scheduled

## 2018-07-17 NOTE — PROGRESS NOTES
"Last 5 Patient Entered Readings                                      Current 30 Day Average:      Recent Readings 2/22/2018 2/22/2018 12/20/2017 12/20/2017 12/3/2017    SBP (mmHg) 115 115 149 149 136    DBP (mmHg) 65 65 90 90 81    Pulse 75 75 75 75 62        Patient reports that she is "going through some stuff" and has been having a "difficult time".   She would like to remain in the [facility].  Requesting one month hiatus. Will follow up then.   "

## 2018-07-26 ENCOUNTER — HOSPITAL ENCOUNTER (OUTPATIENT)
Dept: CARDIOLOGY | Facility: CLINIC | Age: 68
Discharge: HOME | End: 2018-07-26
Payer: MEDICARE

## 2018-07-26 ENCOUNTER — OFFICE VISIT (OUTPATIENT)
Dept: ELECTROPHYSIOLOGY | Facility: CLINIC | Age: 68
End: 2018-07-26
Payer: MEDICARE

## 2018-07-26 ENCOUNTER — CLINICAL SUPPORT (OUTPATIENT)
Dept: ELECTROPHYSIOLOGY | Facility: CLINIC | Age: 68
End: 2018-07-26
Payer: MEDICARE

## 2018-07-26 VITALS
DIASTOLIC BLOOD PRESSURE: 72 MMHG | BODY MASS INDEX: 26.87 KG/M2 | HEART RATE: 69 BPM | SYSTOLIC BLOOD PRESSURE: 128 MMHG | HEIGHT: 69 IN | OXYGEN SATURATION: 99 % | WEIGHT: 181.44 LBS

## 2018-07-26 DIAGNOSIS — E78.2 MIXED HYPERLIPIDEMIA: Chronic | ICD-10-CM

## 2018-07-26 DIAGNOSIS — I49.5 SSS (SICK SINUS SYNDROME): Primary | Chronic | ICD-10-CM

## 2018-07-26 DIAGNOSIS — Z95.0 CARDIAC PACEMAKER IN SITU: ICD-10-CM

## 2018-07-26 DIAGNOSIS — G47.33 OSA (OBSTRUCTIVE SLEEP APNEA): ICD-10-CM

## 2018-07-26 DIAGNOSIS — I44.0 AV BLOCK, 1ST DEGREE: Chronic | ICD-10-CM

## 2018-07-26 DIAGNOSIS — I49.9 CARDIAC ARRHYTHMIA, UNSPECIFIED CARDIAC ARRHYTHMIA TYPE: Primary | ICD-10-CM

## 2018-07-26 DIAGNOSIS — M54.16 LUMBAR RADICULOPATHY: ICD-10-CM

## 2018-07-26 DIAGNOSIS — I45.10 RBBB: ICD-10-CM

## 2018-07-26 DIAGNOSIS — I10 ESSENTIAL HYPERTENSION: Chronic | ICD-10-CM

## 2018-07-26 DIAGNOSIS — I49.9 CARDIAC ARRHYTHMIA, UNSPECIFIED CARDIAC ARRHYTHMIA TYPE: ICD-10-CM

## 2018-07-26 PROCEDURE — 93280 PM DEVICE PROGR EVAL DUAL: CPT | Mod: PBBFAC | Performed by: INTERNAL MEDICINE

## 2018-07-26 PROCEDURE — 99214 OFFICE O/P EST MOD 30 MIN: CPT | Mod: S$PBB,,, | Performed by: INTERNAL MEDICINE

## 2018-07-26 PROCEDURE — 93005 ELECTROCARDIOGRAM TRACING: CPT | Mod: PBBFAC,59 | Performed by: INTERNAL MEDICINE

## 2018-07-26 PROCEDURE — 93010 ELECTROCARDIOGRAM REPORT: CPT | Mod: S$PBB,,, | Performed by: INTERNAL MEDICINE

## 2018-07-26 PROCEDURE — 99999 PR PBB SHADOW E&M-EST. PATIENT-LVL III: CPT | Mod: PBBFAC,,, | Performed by: INTERNAL MEDICINE

## 2018-07-26 PROCEDURE — 99213 OFFICE O/P EST LOW 20 MIN: CPT | Mod: PBBFAC | Performed by: INTERNAL MEDICINE

## 2018-07-26 RX ORDER — FLUOXETINE HYDROCHLORIDE 20 MG/1
40 CAPSULE ORAL DAILY
COMMUNITY
End: 2020-03-31 | Stop reason: DRUGHIGH

## 2018-07-26 NOTE — PROGRESS NOTES
Subjective:   Patient ID:  Manisha Wynne is a 67 y.o. female     Chief complaint:No chief complaint on file.      HPI  Background:  Ms. Wynne is a 67 y.o. female with SSS, first degree AVB s/p PPM, HLD, HTN, and CEDRIC. Syncopal episodes resolved post PPM implantation.Recent cardiac studies:  Stress Echo (10/14/13): The EKG portion of this study was negative for ischemia at a moderate workload, and peak heart rate of 126 bpm (85% of predicted). The Echo portion of the study revealed an EF of 60-65%, mild LAE, trivial-to-mild AR,MR, and TR.   Device Interrogation (07/13/17) reveals an intrinsic SB with stable lead and device function. AMS x 1 (1 hour  48 minute duration; egram c/w AT) NSVT x 1 (6 second duration) noted. He paces 69% in the RA and 0% in the RV. Estimated battery longevity 6 years.      Positional dizziness (long term; when changing position from lying to sitting. Fell about a month ago hurt both wrists; went to PT, pain continues to follow up with      I reviewed today's ECG which demonstrated an A-paced rhythm at 67 bpm; , , and QTc 429.   Update since then:  Doing very well.   She denies SOB/SHEPARD, dizziness, palpitations, or syncope. She reports that she is doing well, and that she remains active w/work without issue. No current CV Sx/c/os  PPM eval today -- all in good repair, RA paced 75%, RV paced 0%. Battery life - 5 years guillermo  I have reviewed the actual image of the ECG tracing obtained today and it shows NSR with FDAVB and RBBB       Current Outpatient Prescriptions   Medication Sig    aspirin (ECOTRIN) 81 MG EC tablet Take 81 mg by mouth. 1 Tablet, Delayed Release (E.C.) Oral Every day    buPROPion (WELLBUTRIN XL) 300 MG 24 hr tablet Take 300 mg by mouth once daily.    calcium-vitamin D 600 mg(1,500mg) -200 unit Tab Take 1 tablet by mouth once daily. 2 Tablets Oral Every day    chlorhexidine (PERIDEX) 0.12 % solution Use as directed 15 mLs in the mouth or throat  2 (two) times daily.     diphenoxylate-atropine 2.5-0.025 mg (LOMOTIL) 2.5-0.025 mg per tablet 1 po after each bowel movement not to exceed 8/24 hours (Patient taking differently: 1 po after each bowel movement not to exceed 8/24 hours. Pt taking prn.)    DULoxetine (CYMBALTA) 30 MG capsule take 1 capsule by mouth once daily    FLUoxetine (PROZAC) 20 MG capsule Take 20 mg by mouth once daily.    lisdexamfetamine (VYVANSE) 70 MG capsule Take 70 mg by mouth every morning.    lisinopril (PRINIVIL,ZESTRIL) 20 MG tablet Take 1 tablet (20 mg total) by mouth once daily.    metoprolol tartrate (LOPRESSOR) 25 MG tablet Take 1 tablet (25 mg total) by mouth 2 (two) times daily.    multivitamin-minerals-lutein (CENTRUM SILVER) Tab Take by mouth. 1 Tablet Oral Every day    pravastatin (PRAVACHOL) 10 MG tablet Take 1 tablet (10 mg total) by mouth once daily.    varicella-zoster gE-AS01B, PF, (SHINGRIX, PF,) 50 mcg/0.5 mL injection Inject into the muscle.    dicyclomine (BENTYL) 20 mg tablet Take 1 tablet (20 mg total) by mouth 3 (three) times daily as needed. (Patient taking differently: Take 20 mg by mouth 3 (three) times daily as needed. Pt taking prn.)    vitamin C-biotin (HAIR-SKIN-NAILS, VIT C-BIOTIN,) 50 mg -1,250 mcg Chew Take by mouth once daily.     No current facility-administered medications for this visit.      Review of Systems   Constitution: Negative for decreased appetite, weakness, weight gain and weight loss.   HENT: Negative for nosebleeds.    Eyes: Negative for blurred vision and visual disturbance.   Cardiovascular: Negative for chest pain, claudication, cyanosis, dyspnea on exertion, irregular heartbeat, leg swelling, near-syncope, orthopnea, palpitations, paroxysmal nocturnal dyspnea and syncope.   Respiratory: Negative for cough, shortness of breath and wheezing.    Endocrine: Negative for heat intolerance.   Skin: Negative for rash.   Musculoskeletal: Negative for muscle weakness and myalgias.    Gastrointestinal: Negative for abdominal pain, anorexia, melena, nausea and vomiting.   Genitourinary: Negative for menorrhagia.   Neurological: Negative for excessive daytime sleepiness, dizziness, headaches, loss of balance, seizures and vertigo.   Psychiatric/Behavioral: Negative for altered mental status and depression. The patient is not nervous/anxious.        Objective:   Physical Exam   Constitutional: She is oriented to person, place, and time. She appears well-developed and well-nourished.   HENT:   Head: Normocephalic and atraumatic.   Right Ear: External ear normal.   Left Ear: External ear normal.   Eyes: Conjunctivae are normal. Pupils are equal, round, and reactive to light. Left eye exhibits no discharge. No scleral icterus.   Neck: Normal range of motion. Neck supple. No thyromegaly present.   Cardiovascular: Normal rate, regular rhythm, normal heart sounds and intact distal pulses.  Exam reveals no gallop, no S3, no S4, no friction rub, no midsystolic click and no opening snap.    No murmur heard.  Pulses:       Carotid pulses are 2+ on the right side, and 2+ on the left side.       Radial pulses are 2+ on the right side, and 2+ on the left side.        Dorsalis pedis pulses are 2+ on the right side, and 2+ on the left side.        Posterior tibial pulses are 2+ on the right side, and 2+ on the left side.   Pulmonary/Chest: Effort normal and breath sounds normal.   Device pocket is in excellent repair   Abdominal: Soft. She exhibits no distension. There is no hepatomegaly. There is no tenderness. There is no guarding.   Musculoskeletal:        Right ankle: She exhibits no swelling.        Left ankle: She exhibits no swelling.        Right lower leg: She exhibits no swelling.        Left lower leg: She exhibits no swelling.   Neurological: She is alert and oriented to person, place, and time. She has normal strength. No cranial nerve deficit. Gait normal.   Skin: Skin is warm, dry and intact. No  "rash noted. No cyanosis. Nails show no clubbing.   Psychiatric: She has a normal mood and affect. Her speech is normal and behavior is normal. Thought content normal. Cognition and memory are normal.   Nursing note and vitals reviewed.    /72   Pulse 69   Ht 5' 8.5" (1.74 m)   Wt 82.3 kg (181 lb 7 oz)   LMP 10/01/2003   SpO2 99%   BMI 27.19 kg/m²      Assessment:      1. SSS (sick sinus syndrome)    2. Cardiac arrhythmia, unspecified cardiac arrhythmia type    3. Essential hypertension    4. Mixed hyperlipidemia    5. AV block, 1st degree    6. RBBB    7. CEDRIC (obstructive sleep apnea)    8. Lumbar radiculopathy    9. Cardiac pacemaker in situ        Plan:      No orders of the defined types were placed in this encounter.    Follow-up in about 1 year (around 7/26/2019), nereyda lainez.  Medications Discontinued During This Encounter   Medication Reason    duloxetine (CYMBALTA) 30 MG capsule Duplicate Order     New Prescriptions    No medications on file     Modified Medications    No medications on file              "

## 2018-07-29 PROBLEM — I45.10 RBBB: Status: ACTIVE | Noted: 2018-07-29

## 2018-07-31 ENCOUNTER — OFFICE VISIT (OUTPATIENT)
Dept: PSYCHIATRY | Facility: CLINIC | Age: 68
End: 2018-07-31
Payer: MEDICARE

## 2018-07-31 DIAGNOSIS — F41.9 ANXIETY: ICD-10-CM

## 2018-07-31 DIAGNOSIS — F98.8 ATTENTION DEFICIT DISORDER (ADD) IN ADULT: ICD-10-CM

## 2018-07-31 DIAGNOSIS — F33.41 MAJOR DEPRESSIVE DISORDER, RECURRENT EPISODE, IN PARTIAL REMISSION: Primary | ICD-10-CM

## 2018-07-31 PROCEDURE — 90834 PSYTX W PT 45 MINUTES: CPT | Mod: PBBFAC | Performed by: SOCIAL WORKER

## 2018-07-31 PROCEDURE — 90834 PSYTX W PT 45 MINUTES: CPT | Mod: S$PBB,,, | Performed by: SOCIAL WORKER

## 2018-07-31 NOTE — PROGRESS NOTES
Individual Psychotherapy (PhD/LCSW)    7/31/2018    Site: [facility]    Therapeutic Intervention: Met with patient alone.  Outpatient - Insight oriented psychotherapy 45 min - CPT code 85409    Chief complaint/reason for encounter: depression, anxiety, distractibility     Interval history and content of current session: Pt doing very well, feels Prozac is helping, more motivated, brighter mood. Brings in journal with list of to-do's and calendar where she is working out the day's plan. She cleared her bed and changed the sheets, which has been a big goal. She is working toward clearing out the papers in her apartment. She has gone to some professional meetings and enjoyed connecting with peers, hopes to find employment. She completed and submitted her application for health care. She is in touch with friends.     Treatment plan:  · Target symptoms: depression, anxiety, distractibility  · Why chosen therapy is appropriate versus another modality: relevant to diagnosis  · Outcome monitoring methods: self-report  · Therapeutic intervention type: insight oriented psychotherapy    Risk parameters:  Patient reports no suicidal ideation  Patient reports no homicidal ideation  Patient reports no self-injurious behavior  Patient reports no violent behavior    Verbal deficits: None    Patient's response to intervention:  The patient's response to intervention is motivated    Progress toward goals and other mental status changes:  The patient's progress toward goals is good    Diagnosis: Major depression, in partial remission, anxiety,  ADD in adult     Plan:  individual psychotherapy    Return to clinic: as scheduled

## 2018-08-07 ENCOUNTER — PATIENT MESSAGE (OUTPATIENT)
Dept: INTERNAL MEDICINE | Facility: CLINIC | Age: 68
End: 2018-08-07

## 2018-08-07 DIAGNOSIS — I10 ESSENTIAL HYPERTENSION: Chronic | ICD-10-CM

## 2018-08-07 DIAGNOSIS — E78.5 HYPERLIPIDEMIA, UNSPECIFIED HYPERLIPIDEMIA TYPE: ICD-10-CM

## 2018-08-07 DIAGNOSIS — I10 HYPERTENSION, ESSENTIAL: ICD-10-CM

## 2018-08-07 RX ORDER — LISINOPRIL 20 MG/1
20 TABLET ORAL DAILY
Qty: 90 TABLET | Refills: 3 | Status: SHIPPED | OUTPATIENT
Start: 2018-08-07 | End: 2019-01-17 | Stop reason: ALTCHOICE

## 2018-08-07 RX ORDER — PRAVASTATIN SODIUM 10 MG/1
10 TABLET ORAL DAILY
Qty: 90 TABLET | Refills: 3 | Status: SHIPPED | OUTPATIENT
Start: 2018-08-07 | End: 2019-09-15 | Stop reason: SDUPTHER

## 2018-08-07 RX ORDER — METOPROLOL TARTRATE 25 MG/1
25 TABLET, FILM COATED ORAL 2 TIMES DAILY
Qty: 180 TABLET | Refills: 3 | Status: SHIPPED | OUTPATIENT
Start: 2018-08-07 | End: 2019-10-13 | Stop reason: SDUPTHER

## 2018-08-13 ENCOUNTER — OFFICE VISIT (OUTPATIENT)
Dept: PSYCHIATRY | Facility: CLINIC | Age: 68
End: 2018-08-13
Payer: MEDICARE

## 2018-08-13 DIAGNOSIS — F33.41 MAJOR DEPRESSIVE DISORDER, RECURRENT EPISODE, IN PARTIAL REMISSION: Primary | ICD-10-CM

## 2018-08-13 DIAGNOSIS — F98.8 ATTENTION DEFICIT DISORDER (ADD) IN ADULT: ICD-10-CM

## 2018-08-13 DIAGNOSIS — F41.9 ANXIETY: ICD-10-CM

## 2018-08-13 PROCEDURE — 90834 PSYTX W PT 45 MINUTES: CPT | Mod: S$GLB,,, | Performed by: SOCIAL WORKER

## 2018-08-13 PROCEDURE — 99999 PR PBB SHADOW E&M-EST. PATIENT-LVL I: CPT | Mod: PBBFAC,,, | Performed by: SOCIAL WORKER

## 2018-08-13 NOTE — PROGRESS NOTES
"Individual Psychotherapy (PhD/LCSW)    8/13/2018    Site: [facility]    Therapeutic Intervention: Met with patient alone.  Outpatient - Insight oriented psychotherapy 45 min - CPT code 74642    Chief complaint/reason for encounter: depression, anxiety, distractibility     Interval history and content of current session: Pt doing well, continues to feel more "present" and "more like myself", credits the medication, is now entirely on Prozac and off Cymbalta. She hurt her back and is taking it easy, remains in touch with friends, working on de-cluttering, which she finds somewhat overwhelming, but is making progress.    Treatment plan:  · Target symptoms: depression, anxiety, distractibility  · Why chosen therapy is appropriate versus another modality: relevant to diagnosis  · Outcome monitoring methods: self-report  · Therapeutic intervention type: insight oriented psychotherapy    Risk parameters:  Patient reports no suicidal ideation  Patient reports no homicidal ideation  Patient reports no self-injurious behavior  Patient reports no violent behavior    Verbal deficits: None    Patient's response to intervention:  The patient's response to intervention is motivated    Progress toward goals and other mental status changes:  The patient's progress toward goals is good    Diagnosis: Major depression, in partial remission, anxiety,  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

## 2018-08-20 ENCOUNTER — PATIENT OUTREACH (OUTPATIENT)
Dept: OTHER | Facility: OTHER | Age: 68
End: 2018-08-20

## 2018-08-20 NOTE — PROGRESS NOTES
Last 5 Patient Entered Readings                                      Current 30 Day Average:      Recent Readings 2/22/2018 2/22/2018 12/20/2017 12/20/2017 12/3/2017    SBP (mmHg) 115 115 149 149 136    DBP (mmHg) 65 65 90 90 81    Pulse 75 75 75 75 62        Sending [facility] message to follow up to determine if patient is ready to resume BP monitoring.  Will follow up in 2 weeks.

## 2018-08-20 NOTE — LETTER
Gris Fuentes, PharmD  [address] [place] [address]     Dear Manisha,    We have made several attempts to encourage your participation in Digital Medicine monitoring. Unfortunately, we have been unsuccessful and this is an official notice that you are no longer enrolled in Hypertension Digital Medicine Program, and thus, we will no longer be managing your hypertension.    Please note this has no impact on your relationship with [facility] or your providers. Going forward, please reach out to your primary care provider with any questions or concerns regarding your health.                         Please contact [phone] if you have any additional questions.     Sincerely,    The [facility] Digital Medicine Team                                                                                                                                            Manisha Wynne  [address]  [place] [address]

## 2018-08-20 NOTE — LETTER
Jenifer Burris  [address] [place] [address]     Dear Manisha,    Thank you for enrolling in the [facility] Hypertension Digital Medicine Program. To participate in our program, we ask that you submit a blood pressure reading at least once weekly through your [facility] Account and maintain regular contact with your Care Team.  We have not received any data or heard from you in some time.     The Digital Medicine Care Team has attempted to reach you on multiple occasions to determine if you would like to continue participating in the program. While we encourage you to continue participating fully, we understand that circumstances may change.      To continue participating in the program, please contact me at [phone]. If we do not hear back, you will be un-enrolled and your physician will be notified of your decision.    If you have submitted blood pressure readings during the past 189 days and believe you are receiving this letter in error, please call the Digital Medicine Patient Support Line at [phone] for troubleshooting.      We look forward to hearing from you soon.    Sincerely,     Jenifer Burris MA, CHES  Your Personal Health                                                                                                                         Manisha Wynne  [address]  [place] [address]

## 2018-08-27 ENCOUNTER — OFFICE VISIT (OUTPATIENT)
Dept: PSYCHIATRY | Facility: CLINIC | Age: 68
End: 2018-08-27
Payer: MEDICARE

## 2018-08-27 DIAGNOSIS — F33.41 MAJOR DEPRESSIVE DISORDER, RECURRENT EPISODE, IN PARTIAL REMISSION: Primary | ICD-10-CM

## 2018-08-27 DIAGNOSIS — F98.8 ATTENTION DEFICIT DISORDER (ADD) IN ADULT: ICD-10-CM

## 2018-08-27 PROCEDURE — 90834 PSYTX W PT 45 MINUTES: CPT | Mod: S$GLB,,, | Performed by: SOCIAL WORKER

## 2018-08-27 PROCEDURE — 90834 PSYTX W PT 45 MINUTES: CPT | Mod: PBBFAC | Performed by: SOCIAL WORKER

## 2018-08-27 NOTE — PROGRESS NOTES
Individual Psychotherapy (PhD/LCSW)    8/27/2018    Site: [facility]    Therapeutic Intervention: Met with patient alone.  Outpatient - Insight oriented psychotherapy 45 min - CPT code 96808    Chief complaint/reason for encounter: depression, anxiety, distractibility     Interval history and content of current session: Pt doing well, stable, has taken a job with a hospice, mostly paperwork but also some visits, which is providing both some income and a sense of purpose and structure. She is making progress in going through paperwork, engaging with friends. She is fatigued at times but not depressed. She has been dressing up a little and finds this also lifts her mood.    Treatment plan:  · Target symptoms: depression, distractibility  · Why chosen therapy is appropriate versus another modality: relevant to diagnosis  · Outcome monitoring methods: self-report  · Therapeutic intervention type: insight oriented psychotherapy    Risk parameters:  Patient reports no suicidal ideation  Patient reports no homicidal ideation  Patient reports no self-injurious behavior  Patient reports no violent behavior    Verbal deficits: None    Patient's response to intervention:  The patient's response to intervention is motivated    Progress toward goals and other mental status changes:  The patient's progress toward goals is good    Diagnosis: Major depression, in partial remission,  ADD in adult     Plan:  individual psychotherapy    Return to clinic: as scheduled

## 2018-08-30 NOTE — PROGRESS NOTES
Last 5 Patient Entered Readings                                      Current 30 Day Average:      Recent Readings 2/22/2018 2/22/2018 12/20/2017 12/20/2017 12/3/2017    SBP (mmHg) 115 115 149 149 136    DBP (mmHg) 65 65 90 90 81    Pulse 75 75 75 75 62        Digital Medicine: Health  Follow Up    Left voicemail to follow up with Ms. Manisha Wynne.  No recent BP readings. Will send noncompliance letter again today.   Will follow up in 2 weeks.

## 2018-08-31 ENCOUNTER — PATIENT MESSAGE (OUTPATIENT)
Dept: PSYCHIATRY | Facility: CLINIC | Age: 68
End: 2018-08-31

## 2018-08-31 ENCOUNTER — PATIENT MESSAGE (OUTPATIENT)
Dept: ADMINISTRATIVE | Facility: OTHER | Age: 68
End: 2018-08-31

## 2018-08-31 ENCOUNTER — PATIENT MESSAGE (OUTPATIENT)
Dept: ELECTROPHYSIOLOGY | Facility: CLINIC | Age: 68
End: 2018-08-31

## 2018-08-31 ENCOUNTER — HOSPITAL ENCOUNTER (OUTPATIENT)
Dept: RADIOLOGY | Facility: HOSPITAL | Age: 68
Discharge: HOME | End: 2018-08-31
Attending: INTERNAL MEDICINE
Payer: MEDICARE

## 2018-08-31 DIAGNOSIS — Z12.31 SCREENING MAMMOGRAM, ENCOUNTER FOR: ICD-10-CM

## 2018-08-31 PROCEDURE — 77067 SCR MAMMO BI INCL CAD: CPT | Mod: 26,,, | Performed by: RADIOLOGY

## 2018-08-31 PROCEDURE — 77063 BREAST TOMOSYNTHESIS BI: CPT | Mod: TC

## 2018-08-31 PROCEDURE — 77063 BREAST TOMOSYNTHESIS BI: CPT | Mod: 26,,, | Performed by: RADIOLOGY

## 2018-09-10 ENCOUNTER — TELEPHONE (OUTPATIENT)
Dept: INTERNAL MEDICINE | Facility: CLINIC | Age: 68
End: 2018-09-10

## 2018-09-10 NOTE — TELEPHONE ENCOUNTER
----- Message from Tonia Mcnally sent at 9/8/2018  9:46 AM CDT -----  Contact: Pt Portal Request---I didnt show any availability until November.  From: Manisha Wynne     Sent: 9/7/2018  7:24 PM CDT       To: Patient Appointment Schedule Request Mailing List  Subject: Appointment Request    Appointment Request From: Manisha Wynne    [facility]    Preferred Date Range: 9/13/2018 - 9/17/2018    Preferred Times: Any time    Reason for visit: Existing Patient    Comments:  I have a constant pain in the middle of my chest that seems to go to my right breast. I've had it for the past couple of weeks.

## 2018-09-13 ENCOUNTER — IMMUNIZATION (OUTPATIENT)
Dept: INTERNAL MEDICINE | Facility: CLINIC | Age: 68
End: 2018-09-13
Payer: MEDICARE

## 2018-09-13 ENCOUNTER — OFFICE VISIT (OUTPATIENT)
Dept: INTERNAL MEDICINE | Facility: CLINIC | Age: 68
End: 2018-09-13
Payer: MEDICARE

## 2018-09-13 ENCOUNTER — OFFICE VISIT (OUTPATIENT)
Dept: PSYCHIATRY | Facility: CLINIC | Age: 68
End: 2018-09-13
Payer: MEDICARE

## 2018-09-13 VITALS
SYSTOLIC BLOOD PRESSURE: 140 MMHG | WEIGHT: 176.38 LBS | DIASTOLIC BLOOD PRESSURE: 82 MMHG | HEART RATE: 65 BPM | HEIGHT: 68 IN | TEMPERATURE: 98 F | OXYGEN SATURATION: 99 % | BODY MASS INDEX: 26.73 KG/M2

## 2018-09-13 DIAGNOSIS — E55.9 VITAMIN D DEFICIENCY: ICD-10-CM

## 2018-09-13 DIAGNOSIS — F33.41 MAJOR DEPRESSIVE DISORDER, RECURRENT EPISODE, IN PARTIAL REMISSION: Primary | ICD-10-CM

## 2018-09-13 DIAGNOSIS — R73.9 HYPERGLYCEMIA: ICD-10-CM

## 2018-09-13 DIAGNOSIS — F98.8 ATTENTION DEFICIT DISORDER (ADD) IN ADULT: ICD-10-CM

## 2018-09-13 DIAGNOSIS — R07.9 CHEST PAIN, UNSPECIFIED TYPE: Primary | ICD-10-CM

## 2018-09-13 PROCEDURE — 90834 PSYTX W PT 45 MINUTES: CPT | Mod: PBBFAC,PO | Performed by: SOCIAL WORKER

## 2018-09-13 PROCEDURE — 99215 OFFICE O/P EST HI 40 MIN: CPT | Mod: PBBFAC,25,27,PO | Performed by: INTERNAL MEDICINE

## 2018-09-13 PROCEDURE — 99999 PR PBB SHADOW E&M-EST. PATIENT-LVL I: CPT | Mod: PBBFAC,,, | Performed by: SOCIAL WORKER

## 2018-09-13 PROCEDURE — 3077F SYST BP >= 140 MM HG: CPT | Mod: CPTII,,, | Performed by: INTERNAL MEDICINE

## 2018-09-13 PROCEDURE — 3079F DIAST BP 80-89 MM HG: CPT | Mod: CPTII,,, | Performed by: INTERNAL MEDICINE

## 2018-09-13 PROCEDURE — 99211 OFF/OP EST MAY X REQ PHY/QHP: CPT | Mod: PBBFAC,25,PO | Performed by: SOCIAL WORKER

## 2018-09-13 PROCEDURE — 93010 ELECTROCARDIOGRAM REPORT: CPT | Mod: ,,, | Performed by: INTERNAL MEDICINE

## 2018-09-13 PROCEDURE — 90834 PSYTX W PT 45 MINUTES: CPT | Mod: S$PBB,,, | Performed by: SOCIAL WORKER

## 2018-09-13 PROCEDURE — 93005 ELECTROCARDIOGRAM TRACING: CPT | Mod: PBBFAC,PO | Performed by: INTERNAL MEDICINE

## 2018-09-13 PROCEDURE — 1100F PTFALLS ASSESS-DOCD GE2>/YR: CPT | Mod: CPTII,,, | Performed by: INTERNAL MEDICINE

## 2018-09-13 PROCEDURE — 99213 OFFICE O/P EST LOW 20 MIN: CPT | Mod: S$PBB,,, | Performed by: INTERNAL MEDICINE

## 2018-09-13 PROCEDURE — 3288F FALL RISK ASSESSMENT DOCD: CPT | Mod: CPTII,,, | Performed by: INTERNAL MEDICINE

## 2018-09-13 PROCEDURE — 99999 PR PBB SHADOW E&M-EST. PATIENT-LVL V: CPT | Mod: PBBFAC,,, | Performed by: INTERNAL MEDICINE

## 2018-09-13 PROCEDURE — 90662 IIV NO PRSV INCREASED AG IM: CPT | Mod: PBBFAC,PO

## 2018-09-13 RX ORDER — MELOXICAM 7.5 MG/1
TABLET ORAL
Qty: 30 TABLET | Refills: 9 | Status: SHIPPED | OUTPATIENT
Start: 2018-09-13 | End: 2018-11-30

## 2018-09-13 NOTE — PROGRESS NOTES
"Individual Psychotherapy (PhD/LCSW)    9/13/2018    Site: [facility]    Therapeutic Intervention: Met with patient alone.  Outpatient - Insight oriented psychotherapy 45 min - CPT code 74290    Chief complaint/reason for encounter: depression, anxiety, distractibility     Interval history and content of current session: Pt back from [place] with close friend in [place]. Pt focuses on her high anxiety about things she was cooking, with NC of "I'm a failure" if something didn't turn out. Invite pt to shift to more realistic assessment, discuss relaxing, slowing down and focusing to help her enjoy tasks and increase odds of success.    Pt's 68th birthday is tomorrow, pt experiencing fear of death and aging. Help pt draw on resources she uses with pts in her work as , re-focus on the things that are making her life meaningful and enjoyable.    Treatment plan:  · Target symptoms: depression, distractibility  · Why chosen therapy is appropriate versus another modality: relevant to diagnosis  · Outcome monitoring methods: self-report  · Therapeutic intervention type: insight oriented psychotherapy    Risk parameters:  Patient reports no suicidal ideation  Patient reports no homicidal ideation  Patient reports no self-injurious behavior  Patient reports no violent behavior    Verbal deficits: None    Patient's response to intervention:  The patient's response to intervention is motivated    Progress toward goals and other mental status changes:  The patient's progress toward goals is fair    Diagnosis: Major depression, in partial remission,  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

## 2018-09-13 NOTE — PROGRESS NOTES
Last 5 Patient Entered Readings                                      Current 30 Day Average:      Recent Readings 2/22/2018 2/22/2018 12/20/2017 12/20/2017 12/3/2017    SBP (mmHg) 115 115 149 149 136    DBP (mmHg) 65 65 90 90 81    Pulse 75 75 75 75 62        Digital Medicine: Health  Follow Up    Left voicemail to follow up with Ms. Manisha Wynne.  Sending certified discharge letter today.  Will notify enrolling provider at time of discharge.

## 2018-09-14 ENCOUNTER — TELEPHONE (OUTPATIENT)
Dept: INTERNAL MEDICINE | Facility: CLINIC | Age: 68
End: 2018-09-14

## 2018-09-21 ENCOUNTER — HOSPITAL ENCOUNTER (OUTPATIENT)
Dept: RADIOLOGY | Facility: HOSPITAL | Age: 68
Discharge: HOME | End: 2018-09-21
Attending: INTERNAL MEDICINE
Payer: MEDICARE

## 2018-09-21 DIAGNOSIS — R07.9 CHEST PAIN, UNSPECIFIED TYPE: ICD-10-CM

## 2018-09-21 PROCEDURE — 71046 X-RAY EXAM CHEST 2 VIEWS: CPT | Mod: 26,,, | Performed by: RADIOLOGY

## 2018-09-21 PROCEDURE — 71046 X-RAY EXAM CHEST 2 VIEWS: CPT | Mod: TC,FY,PO

## 2018-09-24 NOTE — PROGRESS NOTES
Subjective:       Patient ID: Manisha Wynne is a 68 y.o. female.    Chief Complaint: Chest Pain    HPIPt with R sided CP for about one week - was in [place] when it started.  Had been walking one mile to the beach - walking on the beach and walking back - no CP or SOB.  Pain is worse with breathing but also with moving and twisting. No arm or jaw pain.  No assoc SOB, nausea or diaphoresis.  Review of Systems   Respiratory: Negative for shortness of breath (PND or orthopnea).    Cardiovascular: Negative for chest pain (arm pain or jaw pain).   Gastrointestinal: Negative for abdominal pain, diarrhea, nausea and vomiting.   Genitourinary: Negative for dysuria.   Neurological: Negative for seizures, syncope and headaches.       Objective:      Physical Exam   Constitutional: She is oriented to person, place, and time. She appears well-developed and well-nourished. No distress.   HENT:   Head: Normocephalic.   Mouth/Throat: Oropharynx is clear and moist.   Eyes: EOM are normal. Pupils are equal, round, and reactive to light.   Neck: Neck supple. No JVD present. No thyromegaly present.   Cardiovascular: Normal rate, regular rhythm, normal heart sounds and intact distal pulses. Exam reveals no gallop and no friction rub.   No murmur heard.  Pulmonary/Chest: Effort normal and breath sounds normal. She has no wheezes. She has no rales.   Breasts: No masses, discharge or adenopathy bilaterally  Pain is reproducible - pressing near sternum on the right side.   Abdominal: Soft. Bowel sounds are normal. She exhibits no distension and no mass. There is no tenderness. There is no rebound and no guarding.   Musculoskeletal: She exhibits no edema.   Lymphadenopathy:     She has no cervical adenopathy.   Neurological: She is alert and oriented to person, place, and time. She has normal reflexes.   Skin: Skin is warm and dry.   Psychiatric: She has a normal mood and affect. Her behavior is normal. Judgment and thought  content normal.       Assessment:       1. Chest pain, unspecified type    2. Hyperglycemia    3. Vitamin D deficiency        Plan:   Chest pain, unspecified type  -     EKG 12-lead  -     CBC auto differential; Future; Expected date: 09/13/2018  -     X-Ray Chest PA And Lateral; Future; Expected date: 09/13/2018  Appears musculoskeletal/doubt cardiac  Hyperglycemia  -     Hemoglobin A1c; Future; Expected date: 09/13/2018    Vitamin D deficiency  -     Vitamin D; Future; Expected date: 09/13/2018    Other orders  -     meloxicam (MOBIC) 7.5 MG tablet; One tablet twice daily as needed for pain  Dispense: 30 tablet; Refill: 9    Call for worsening symptoms.

## 2018-09-26 NOTE — PROGRESS NOTES
Last 5 Patient Entered Readings                                      Current 30 Day Average:      Recent Readings 2/22/2018 2/22/2018 12/20/2017 12/20/2017 12/3/2017    SBP (mmHg) 115 115 149 149 136    DBP (mmHg) 65 65 90 90 81    Pulse 75 75 75 75 62        [facility]   Will notify enrolling provider.

## 2018-10-04 ENCOUNTER — TELEPHONE (OUTPATIENT)
Dept: CARDIOLOGY | Facility: CLINIC | Age: 68
End: 2018-10-04

## 2018-10-04 DIAGNOSIS — E78.2 MIXED HYPERLIPIDEMIA: ICD-10-CM

## 2018-10-04 DIAGNOSIS — I10 HYPERTENSION, ESSENTIAL: Primary | ICD-10-CM

## 2018-10-10 ENCOUNTER — OFFICE VISIT (OUTPATIENT)
Dept: PSYCHIATRY | Facility: CLINIC | Age: 68
End: 2018-10-10
Payer: MEDICARE

## 2018-10-10 DIAGNOSIS — F33.41 MAJOR DEPRESSIVE DISORDER, RECURRENT EPISODE, IN PARTIAL REMISSION: Primary | ICD-10-CM

## 2018-10-10 DIAGNOSIS — F98.8 ATTENTION DEFICIT DISORDER (ADD) IN ADULT: ICD-10-CM

## 2018-10-10 PROCEDURE — 90834 PSYTX W PT 45 MINUTES: CPT | Mod: PBBFAC,PO | Performed by: SOCIAL WORKER

## 2018-10-10 PROCEDURE — 90834 PSYTX W PT 45 MINUTES: CPT | Mod: S$PBB,,, | Performed by: SOCIAL WORKER

## 2018-10-16 ENCOUNTER — CLINICAL SUPPORT (OUTPATIENT)
Dept: ELECTROPHYSIOLOGY | Facility: CLINIC | Age: 68
End: 2018-10-16
Attending: INTERNAL MEDICINE
Payer: MEDICARE

## 2018-10-16 DIAGNOSIS — I44.0 AV BLOCK, 1ST DEGREE: Chronic | ICD-10-CM

## 2018-10-16 DIAGNOSIS — I49.5 SSS (SICK SINUS SYNDROME): Chronic | ICD-10-CM

## 2018-10-16 DIAGNOSIS — Z95.0 CARDIAC PACEMAKER IN SITU: ICD-10-CM

## 2018-10-16 PROCEDURE — 93293 PM PHONE R-STRIP DEVICE EVAL: CPT | Mod: PBBFAC | Performed by: INTERNAL MEDICINE

## 2018-10-24 NOTE — PROGRESS NOTES
"Last 5 Patient Entered Readings                                      Current 30 Day Average:      Recent Readings 2/22/2018 2/22/2018 12/20/2017 12/20/2017 12/3/2017    SBP (mmHg) 115 115 149 149 136    DBP (mmHg) 65 65 90 90 81    Pulse 75 75 75 75 62        Patient reports that she has been going through "some stuff" lately, but is still interested in participating in the HDMP. She will reset her digital cuff today, and try to take a reading. Patient will reach out if assistance is needed. Otherwise, will follow up in 2 weeks.    "
Last 5 Patient Entered Readings                                      Current 30 Day Average:      Recent Readings 2/22/2018 2/22/2018 12/20/2017 12/20/2017 12/3/2017    SBP (mmHg) 115 115 149 149 136    DBP (mmHg) 65 65 90 90 81    Pulse 75 75 75 75 62        [facility] has made several attempts to reach Ms. Wynne regarding participation. She has not submitted a BP reading since 2/22. Will send noncompliance letter today. Will send certified discharge letter at next outreach.    
Detail Level: Zone

## 2018-11-28 ENCOUNTER — OFFICE VISIT (OUTPATIENT)
Dept: PSYCHIATRY | Facility: CLINIC | Age: 68
End: 2018-11-28
Payer: MEDICARE

## 2018-11-28 DIAGNOSIS — F33.41 MAJOR DEPRESSIVE DISORDER, RECURRENT EPISODE, IN PARTIAL REMISSION: Primary | ICD-10-CM

## 2018-11-28 DIAGNOSIS — F98.8 ATTENTION DEFICIT DISORDER (ADD) IN ADULT: ICD-10-CM

## 2018-11-28 PROCEDURE — 90834 PSYTX W PT 45 MINUTES: CPT | Mod: S$GLB,,, | Performed by: SOCIAL WORKER

## 2018-11-28 PROCEDURE — 99999 PR PBB SHADOW E&M-EST. PATIENT-LVL I: CPT | Mod: PBBFAC,,, | Performed by: SOCIAL WORKER

## 2018-11-29 ENCOUNTER — PATIENT MESSAGE (OUTPATIENT)
Dept: SLEEP MEDICINE | Facility: CLINIC | Age: 68
End: 2018-11-29
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
## 2018-11-30 ENCOUNTER — OFFICE VISIT (OUTPATIENT)
Dept: SLEEP MEDICINE | Facility: CLINIC | Age: 68
End: 2018-11-30
Payer: MEDICARE

## 2018-11-30 VITALS
WEIGHT: 170.81 LBS | BODY MASS INDEX: 25.89 KG/M2 | SYSTOLIC BLOOD PRESSURE: 138 MMHG | DIASTOLIC BLOOD PRESSURE: 78 MMHG | HEART RATE: 84 BPM | HEIGHT: 68 IN

## 2018-11-30 DIAGNOSIS — I44.0 AV BLOCK, 1ST DEGREE: Chronic | ICD-10-CM

## 2018-11-30 DIAGNOSIS — Z95.0 CARDIAC PACEMAKER IN SITU: ICD-10-CM

## 2018-11-30 DIAGNOSIS — I10 ESSENTIAL HYPERTENSION: Chronic | ICD-10-CM

## 2018-11-30 DIAGNOSIS — G47.33 OBSTRUCTIVE SLEEP APNEA: Primary | ICD-10-CM

## 2018-11-30 PROCEDURE — 3078F DIAST BP <80 MM HG: CPT | Mod: CPTII,S$GLB,, | Performed by: NURSE PRACTITIONER

## 2018-11-30 PROCEDURE — 99214 OFFICE O/P EST MOD 30 MIN: CPT | Mod: S$GLB,,, | Performed by: NURSE PRACTITIONER

## 2018-11-30 PROCEDURE — 1101F PT FALLS ASSESS-DOCD LE1/YR: CPT | Mod: CPTII,S$GLB,, | Performed by: NURSE PRACTITIONER

## 2018-11-30 PROCEDURE — 3075F SYST BP GE 130 - 139MM HG: CPT | Mod: CPTII,S$GLB,, | Performed by: NURSE PRACTITIONER

## 2018-11-30 PROCEDURE — 99999 PR PBB SHADOW E&M-EST. PATIENT-LVL III: CPT | Mod: PBBFAC,,, | Performed by: NURSE PRACTITIONER

## 2018-11-30 NOTE — PROGRESS NOTES
This 68 y.o. female patient returns today  for the management of obstructive sleep apnea, Last seen 8/16/17 8/11/16:  Patient is 26 minutes late for her scheduled follow up appointment.    CPAP titration 6/7/16: Improved with CPAP at 8-12 cm, but an effective pressure not conclusively determined. Oral leaks were noted. Wt 188 lbs    Patient has been set up on CPAP.  She reports having trouble with the full face mask. She is trying her second mask  She likes to read before bedtime and her current mask does not allow her to wear glasses, and she falls asleep without CPAP on.    ESS = 7    CPAP interrogation: set 8-12 cm [facility]: 103 treatment hours; 6/30 days >4 hours; predicted AHI 12.3, 90% tile 11.9    She denies nasal stuffiness.    PRIOR SLEEP HISTORY:    Patient reports consistent difficulty getting to work on time. As a result of work up, a sleep study was ordered. It was positive for obstructive sleep apnea. She was directed here for further care.    Sleep symptoms for past 5-6 years:  Difficulty falling asleep at night, at times, particularly if worry. This can delay sleep onset by 2 hours.  Wakes once for bathroom.  Sometimes cramps in her legs.  Alarms go off at 6:15-6:30 am  Greatest difficulty is waking up in the morning. Even if she gets up on time, she may sit on the bed and accidentally fall back asleep. This has resulted in tardiness to work.    Prior tendency to be a night owl, but lately difficulty staying up late.    ESS = 11    Seen by Dr. Blue on 3/17/16, who ordered sleep study.      INTERVAL HISTORY:  9/22/16: She continues to use apap 8-13cm. She likes new Rox view mask so she can read before bedtime. Gets to bed late at times due to evening work (alt day shifts). May fall asleep on couch sometimes but applies mask when up few hour later. Feels better. Sleeping better. ESS=8    Interrogation: AHI 12.3, 0-1% periodic. 90% tile 10.5-12.6cm. 100% mask fit. avg use 2.7h/n.  "9/30d>4h.     10/27/16:  Easier to wake up, more refreshed up awakening. Sleeping longer with mask on now. Not falling asleep on couch when home w/o mask on anymore. Feels tired though when home from work. Got much better sleep this weekend 7h/night. Continues to liike her FFM. Always uses ramp. Likes higher humidity. ESS=5    Interrogation: 90% tile 13.8-15.5. AHI 9.4-10.4, 18/30d>4h. Avd 30d 4.1h/n 100% mask fit. 0% periodic    12/29/16: She is sleeping more and using mask more since last seen. Traveling with machine. Her work schedule is better now. Using ramp. Doing better and likes using mask. Feels better.     Interrogation- 7d avg 4.3h/n. 12/30d>4h. 99 %mask fit. AHI 4.2    8/16/17:   Continues to use but not consistently."on and off". Feels and sleeps better when she does use it. When out of town she uses it more (ie:[place] trip). +ramp use. Often falls asleep on couch watching tv. ESS=9  Interrogation- AHI 3.7-6.1, 90% tile 12-15, 100% mask fit, AHI 0.6, 2/30d>4h.     11/20/18:   Has used her apap some since last seen. Below reveals 15d last 6-mos. 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 Recent [place] travel. Wants to get back using machine more often, needs new supplies. Will have her PPM interrogated in march 2019 ([facility], 1st degree AVB, sees Dr. Whelan). BP stable.   Encore: Date Range: 9/23/2017 - 3/21/2018     Hide 90% tile 14cm. 0 %leak     Compliance Summary  Apnea Indices    Days with Device Usage:  15 days  Average AHI:  5.7    Percentage of Days >=4 Hours:  3.9%     Average Usage (Days Used):  3 hrs. 56 mins. 25 secs.     Average Usage (All Days):  19 mins. 42 secs.                   BASELINE PSG 3/22/16: +CEDRIC, AHI 27.8, low sat 88%, wt 188 lbs  Titration study- 6//16: CPAP was explored from 4 to 16 cm of water using a small Eson and a Nuance nasal pillows mask, chin strap, CFlex at 3, and heated humidification. Initial improvement was noted at 8 cm, " "but higher pressures were needed due to breakthrough events. While supine, mouth leaks were common, despite chinstrap. Residual breakthrough events did not improve about 12 cm and control of CEDRIC was worsened at higher pressures along with frequent oral leaks. REM stage sleep was not seen, so an effective pressure could not be conclusively determined.     She is a  at 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    SLEEP ROUTINE:   Bed partner: none   Time to bed: 10:30-11:30 pm   Sleep onset latency: sometimes 2 hours   Disruptions or awakenings: once for bathroom   Wakeup time: 6:15-6:30 am   Perceived sleep quality: fair   Daytime naps: none    Past Medical History:   Diagnosis Date    Abnormal Pap smear     Atrial fibrillation     AV block, 1st degree 7/25/2012    Cataract     Depression 7/24/2012    Facet arthritis of lumbar region 3/31/2015    Hyperlipidemia     Hypertension 7/24/2012    Other specified cardiac dysrhythmias(427.89)     Syncope 7/24/2012       REVIEW OF SYSTEMS:  Sleep related symptoms as per HPI; 16# LOSS, less stressed,  otherwise a balance review of 10-systems is negative.       PHYSICAL EXAM:  /78   Pulse 84   Ht 5' 8" (1.727 m)   Wt 77.5 kg (170 lb 12.8 oz)   LMP 10/01/2003   BMI 25.97 kg/m²   GENERAL: Well groomed; Normally developed;      ASSESSMENT:    1. Obstructive Sleep Apnea, moderate by AHI. The patient symptomatically has sleep disruption and difficulty waking in the morning, likely due to sleep inertia and inadequate sleep quality. She has just started on CPAP, and attempting to acclimate to it. She is tolerating the pressure, but having difficulty with her mask fit. 9/22, since got new FFM much better acclimation/ability to fall asleep with mask, she is adherent but work schedule affects usage.  Symptoms improved with use. She is granted additional 30d to improve use/meet ins use guidelines. Current 85d use compliance if 26% 10/27: She is doing better, sleeping " longer with mask on, applying mask early when home now. Persistent mild residual ahi. Feels good using therapy. 12/29: Adherent with therapy, doing much better, feels good and likes using PAP therapy. AHI <5 indicating effective therapy with 4+hr/night use. 8/16/17 and 11/30/18: Adherent but very limited due to not putting mask on/falling asleep w/o mask and stress. Motivated to continue using therapy. Continues to feel better with use.     2. Insomnia, adjustment, during times of stress.    She has medical co-morbidities of 1st deg AVB, SSS with PPM,  HTN (stable)           PLAN:  1.RESUME nightly use, avoid sleep w/o mask on. Continue APAP 11-18cm. rtc re-eval 3-mos, get new supplies next week [facility]    Education: During our discussion today, we talked about the importance of use given severity of her CEDRIC.   See cards as advised and PCP re: HTN mgt (stable)

## 2018-12-19 ENCOUNTER — OFFICE VISIT (OUTPATIENT)
Dept: PSYCHIATRY | Facility: CLINIC | Age: 68
End: 2018-12-19
Payer: MEDICARE

## 2018-12-19 DIAGNOSIS — F33.41 MAJOR DEPRESSIVE DISORDER, RECURRENT EPISODE, IN PARTIAL REMISSION: Primary | ICD-10-CM

## 2018-12-19 DIAGNOSIS — F98.8 ATTENTION DEFICIT DISORDER (ADD) IN ADULT: ICD-10-CM

## 2018-12-19 PROCEDURE — 90834 PSYTX W PT 45 MINUTES: CPT | Mod: S$GLB,,, | Performed by: SOCIAL WORKER

## 2018-12-19 NOTE — PROGRESS NOTES
Individual Psychotherapy (PhD/LCSW)    12/19/2018    Site: [facility]    Therapeutic Intervention: Met with patient alone.  Outpatient - Insight oriented psychotherapy 45 min - CPT code 43208    Chief complaint/reason for encounter: depression, anxiety, distractibility     Interval history and content of current session: Pt comes in an hour late, believed appointment was for 3, reports she is not doing well. She found a house to rent and is excited about it, but feels overwhelmed by packing and trying to get rid of clutter. Pt denies any other issue, does plan to have someone come in and help her. She has an appointment with her psychiatrist in early January to evaluate her meds. Confirm appointment 1/2/19.    Treatment plan:  · Target symptoms: depression, distractibility  · Why chosen therapy is appropriate versus another modality: relevant to diagnosis  · Outcome monitoring methods: self-report  · Therapeutic intervention type: insight oriented psychotherapy    Risk parameters:  Patient reports no suicidal ideation  Patient reports no homicidal ideation  Patient reports no self-injurious behavior  Patient reports no violent behavior    Verbal deficits: None    Patient's response to intervention:  The patient's response to intervention is motivated    Progress toward goals and other mental status changes:  The patient's progress toward goals is limited    Diagnosis: Major depression, in partial remission,  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

## 2019-01-09 ENCOUNTER — NURSE TRIAGE (OUTPATIENT)
Dept: ADMINISTRATIVE | Facility: CLINIC | Age: 69
End: 2019-01-09

## 2019-01-10 NOTE — TELEPHONE ENCOUNTER
"    Reason for Disposition   General information question, no triage required and triager able to answer question    Answer Assessment - Initial Assessment Questions  1. REASON FOR CALL or QUESTION: "What is your reason for calling today?" or "How can I best help you?" or "What question do you have that I can help answer?"      Pt has questions about eating before labs tomorrow.    Protocols used: ST INFORMATION ONLY CALL-A-AH      "

## 2019-01-14 ENCOUNTER — OFFICE VISIT (OUTPATIENT)
Dept: PSYCHIATRY | Facility: CLINIC | Age: 69
End: 2019-01-14
Payer: MEDICARE

## 2019-01-14 DIAGNOSIS — F33.1 MAJOR DEPRESSIVE DISORDER, RECURRENT, MODERATE: Primary | ICD-10-CM

## 2019-01-14 DIAGNOSIS — F98.8 ATTENTION DEFICIT DISORDER (ADD) IN ADULT: ICD-10-CM

## 2019-01-14 PROCEDURE — 99999 PR PBB SHADOW E&M-EST. PATIENT-LVL I: CPT | Mod: PBBFAC,,, | Performed by: SOCIAL WORKER

## 2019-01-14 PROCEDURE — 90834 PSYTX W PT 45 MINUTES: CPT | Mod: S$GLB,,, | Performed by: SOCIAL WORKER

## 2019-01-14 PROCEDURE — 99999 PR PBB SHADOW E&M-EST. PATIENT-LVL I: ICD-10-PCS | Mod: PBBFAC,,, | Performed by: SOCIAL WORKER

## 2019-01-14 PROCEDURE — 90834 PR PSYCHOTHERAPY W/PATIENT, 45 MIN: ICD-10-PCS | Mod: S$GLB,,, | Performed by: SOCIAL WORKER

## 2019-01-14 NOTE — PROGRESS NOTES
Individual Psychotherapy (PhD/LCSW)    1/14/2019    Site: [facility]    Therapeutic Intervention: Met with patient alone.  Outpatient - Insight oriented psychotherapy 45 min - CPT code 12971    Chief complaint/reason for encounter: depression, anxiety, distractibility     Interval history and content of current session: Pt seen. She is depressed, not eating well, falling asleep on couch, then going to bed at 3:00, sleeping until noon. She is isolating socially. Pt in the process of moving, movers did not move all her things, some are still not boxed, and pt needs help in transferring the rest, plans to call friends but admits she has not kept up with her friends. 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 Pt saw her psychiatrist yesterday, no change in meds. She denies suicidal ideation. Agree we will meet and reassess in 2 weeks and pt will reach out to me if she is not doing well between now and then.    Treatment plan:  · Target symptoms: depression, distractibility  · Why chosen therapy is appropriate versus another modality: relevant to diagnosis  · Outcome monitoring methods: self-report  · Therapeutic intervention type: insight oriented psychotherapy    Risk parameters:  Patient reports no suicidal ideation  Patient reports no homicidal ideation  Patient reports no self-injurious behavior  Patient reports no violent behavior    Verbal deficits: None    Patient's response to intervention:  The patient's response to intervention is motivated    Progress toward goals and other mental status changes:  The patient's progress toward goals is limited    Diagnosis: Major depression, irecurrent, moderate,  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

## 2019-01-15 ENCOUNTER — LAB VISIT (OUTPATIENT)
Dept: LAB | Facility: HOSPITAL | Age: 69
End: 2019-01-15
Attending: INTERNAL MEDICINE
Payer: MEDICARE

## 2019-01-15 ENCOUNTER — TELEPHONE (OUTPATIENT)
Dept: CARDIOLOGY | Facility: CLINIC | Age: 69
End: 2019-01-15

## 2019-01-15 DIAGNOSIS — E78.2 MIXED HYPERLIPIDEMIA: ICD-10-CM

## 2019-01-15 DIAGNOSIS — I10 HYPERTENSION, ESSENTIAL: ICD-10-CM

## 2019-01-15 LAB
ALBUMIN SERPL BCP-MCNC: 3.8 G/DL
ALP SERPL-CCNC: 66 U/L
ALT SERPL W/O P-5'-P-CCNC: 26 U/L
ANION GAP SERPL CALC-SCNC: 7 MMOL/L
AST SERPL-CCNC: 27 U/L
BILIRUB SERPL-MCNC: 0.6 MG/DL
BUN SERPL-MCNC: 22 MG/DL
CALCIUM SERPL-MCNC: 10.6 MG/DL
CHLORIDE SERPL-SCNC: 107 MMOL/L
CHOLEST SERPL-MCNC: 212 MG/DL
CHOLEST/HDLC SERPL: 2.9 {RATIO}
CO2 SERPL-SCNC: 24 MMOL/L
CREAT SERPL-MCNC: 1.2 MG/DL
EST. GFR  (AFRICAN AMERICAN): 52.9 ML/MIN/1.73 M^2
EST. GFR  (NON AFRICAN AMERICAN): 45.9 ML/MIN/1.73 M^2
GLUCOSE SERPL-MCNC: 111 MG/DL
HDLC SERPL-MCNC: 72 MG/DL
HDLC SERPL: 34 %
LDLC SERPL CALC-MCNC: 124.6 MG/DL
NONHDLC SERPL-MCNC: 140 MG/DL
POTASSIUM SERPL-SCNC: 4.3 MMOL/L
PROT SERPL-MCNC: 7.2 G/DL
SODIUM SERPL-SCNC: 138 MMOL/L
TRIGL SERPL-MCNC: 77 MG/DL
TSH SERPL DL<=0.005 MIU/L-ACNC: 3.65 UIU/ML

## 2019-01-15 PROCEDURE — 80061 LIPID PANEL: CPT

## 2019-01-15 PROCEDURE — 80053 COMPREHEN METABOLIC PANEL: CPT

## 2019-01-15 PROCEDURE — 36415 COLL VENOUS BLD VENIPUNCTURE: CPT

## 2019-01-15 PROCEDURE — 84443 ASSAY THYROID STIM HORMONE: CPT

## 2019-01-15 NOTE — TELEPHONE ENCOUNTER
----- Message from Natali Comer MD sent at 1/15/2019  1:28 PM CST -----  Please review.  We will discuss the results during your upcoming visit with me. It looks fine. Calcium is slightly elevated

## 2019-01-17 ENCOUNTER — OFFICE VISIT (OUTPATIENT)
Dept: CARDIOLOGY | Facility: CLINIC | Age: 69
End: 2019-01-17
Payer: MEDICARE

## 2019-01-17 VITALS
SYSTOLIC BLOOD PRESSURE: 115 MMHG | DIASTOLIC BLOOD PRESSURE: 56 MMHG | WEIGHT: 165.38 LBS | HEART RATE: 66 BPM | BODY MASS INDEX: 24.5 KG/M2 | HEIGHT: 69 IN

## 2019-01-17 DIAGNOSIS — N18.30 CKD (CHRONIC KIDNEY DISEASE) STAGE 3, GFR 30-59 ML/MIN: ICD-10-CM

## 2019-01-17 DIAGNOSIS — Z95.0 CARDIAC PACEMAKER IN SITU: ICD-10-CM

## 2019-01-17 DIAGNOSIS — I45.10 RBBB: ICD-10-CM

## 2019-01-17 DIAGNOSIS — I49.5 SSS (SICK SINUS SYNDROME): Chronic | ICD-10-CM

## 2019-01-17 DIAGNOSIS — I10 ESSENTIAL HYPERTENSION: Primary | Chronic | ICD-10-CM

## 2019-01-17 DIAGNOSIS — I10 ESSENTIAL HYPERTENSION: Chronic | ICD-10-CM

## 2019-01-17 DIAGNOSIS — G47.33 OSA (OBSTRUCTIVE SLEEP APNEA): ICD-10-CM

## 2019-01-17 DIAGNOSIS — E78.2 MIXED HYPERLIPIDEMIA: Chronic | ICD-10-CM

## 2019-01-17 DIAGNOSIS — F32.A DEPRESSION, UNSPECIFIED DEPRESSION TYPE: ICD-10-CM

## 2019-01-17 DIAGNOSIS — I44.0 AV BLOCK, 1ST DEGREE: Chronic | ICD-10-CM

## 2019-01-17 DIAGNOSIS — I67.2 CEREBRAL ATHEROSCLEROSIS: ICD-10-CM

## 2019-01-17 PROCEDURE — 3078F DIAST BP <80 MM HG: CPT | Mod: CPTII,S$GLB,, | Performed by: INTERNAL MEDICINE

## 2019-01-17 PROCEDURE — 1101F PT FALLS ASSESS-DOCD LE1/YR: CPT | Mod: CPTII,S$GLB,, | Performed by: INTERNAL MEDICINE

## 2019-01-17 PROCEDURE — 99999 PR PBB SHADOW E&M-EST. PATIENT-LVL III: CPT | Mod: PBBFAC,,, | Performed by: INTERNAL MEDICINE

## 2019-01-17 PROCEDURE — 3074F PR MOST RECENT SYSTOLIC BLOOD PRESSURE < 130 MM HG: ICD-10-PCS | Mod: CPTII,S$GLB,, | Performed by: INTERNAL MEDICINE

## 2019-01-17 PROCEDURE — 3074F SYST BP LT 130 MM HG: CPT | Mod: CPTII,S$GLB,, | Performed by: INTERNAL MEDICINE

## 2019-01-17 PROCEDURE — 99999 PR PBB SHADOW E&M-EST. PATIENT-LVL III: ICD-10-PCS | Mod: PBBFAC,,, | Performed by: INTERNAL MEDICINE

## 2019-01-17 PROCEDURE — 99214 PR OFFICE/OUTPT VISIT, EST, LEVL IV, 30-39 MIN: ICD-10-PCS | Mod: S$GLB,,, | Performed by: INTERNAL MEDICINE

## 2019-01-17 PROCEDURE — 99214 OFFICE O/P EST MOD 30 MIN: CPT | Mod: S$GLB,,, | Performed by: INTERNAL MEDICINE

## 2019-01-17 PROCEDURE — 3078F PR MOST RECENT DIASTOLIC BLOOD PRESSURE < 80 MM HG: ICD-10-PCS | Mod: CPTII,S$GLB,, | Performed by: INTERNAL MEDICINE

## 2019-01-17 PROCEDURE — 1101F PR PT FALLS ASSESS DOC 0-1 FALLS W/OUT INJ PAST YR: ICD-10-PCS | Mod: CPTII,S$GLB,, | Performed by: INTERNAL MEDICINE

## 2019-01-17 RX ORDER — LOSARTAN POTASSIUM 25 MG/1
25 TABLET ORAL DAILY
Qty: 30 TABLET | Refills: 1 | Status: SHIPPED | OUTPATIENT
Start: 2019-01-17 | End: 2019-01-17 | Stop reason: SDUPTHER

## 2019-01-17 RX ORDER — LOSARTAN POTASSIUM 25 MG/1
TABLET ORAL
Qty: 90 TABLET | Refills: 1 | Status: SHIPPED | OUTPATIENT
Start: 2019-01-17 | End: 2019-01-25 | Stop reason: SDUPTHER

## 2019-01-17 RX ORDER — ACETAMINOPHEN 500 MG
5000 TABLET ORAL DAILY
Status: ON HOLD | COMMUNITY
End: 2022-12-13 | Stop reason: SDUPTHER

## 2019-01-17 RX ORDER — LOSARTAN POTASSIUM 25 MG/1
25 TABLET ORAL DAILY
COMMUNITY
End: 2019-01-17 | Stop reason: SDUPTHER

## 2019-01-17 NOTE — PROGRESS NOTES
Subjective:   Patient ID:  Manisha Wynne is a 68 y.o. female who presents for follow-up of Hypertension      HPI: No new complaints. Doing well. Sometime last fall experienced chest discomfort, but it has since resolved.  Blood work indicates slightly worsened (GFR 45) and borderline high calcium. Patient did not have physical for over a year.  She states she is about to make an appointment with her PCP.    Lab Results   Component Value Date     01/15/2019    K 4.3 01/15/2019     01/15/2019    CO2 24 01/15/2019    BUN 22 01/15/2019    CREATININE 1.2 01/15/2019     (H) 01/15/2019    HGBA1C 5.4 09/21/2018    MG 2.5 09/07/2012    AST 27 01/15/2019    ALT 26 01/15/2019    ALBUMIN 3.8 01/15/2019    PROT 7.2 01/15/2019    BILITOT 0.6 01/15/2019    WBC 5.81 09/21/2018    HGB 11.0 (L) 09/21/2018    HCT 33.1 (L) 09/21/2018    MCV 93 09/21/2018     09/21/2018    INR 1.1 09/06/2012    TSH 3.647 01/15/2019    CHOL 212 (H) 01/15/2019    HDL 72 01/15/2019    LDLCALC 124.6 01/15/2019    TRIG 77 01/15/2019       Review of Systems   Constitution: Positive for malaise/fatigue and weight loss.   HENT: Negative.    Eyes: Negative.    Cardiovascular: Negative.  Negative for chest pain, claudication, dyspnea on exertion, irregular heartbeat, leg swelling, near-syncope, palpitations and syncope.   Respiratory: Negative.  Negative for cough, shortness of breath, snoring and wheezing.    Endocrine: Negative.  Negative for cold intolerance, heat intolerance, polydipsia, polyphagia and polyuria.   Skin: Negative.    Musculoskeletal: Positive for arthritis.   Gastrointestinal: Positive for bloating.   Genitourinary: Negative.    Neurological: Negative.    Psychiatric/Behavioral: Positive for depression. The patient is nervous/anxious.        Objective:   Physical Exam   Constitutional: She is oriented to person, place, and time. She appears well-developed and well-nourished.   BP (!) 115/56   Pulse 66   Ht  "5' 8.5" (1.74 m)   Wt 75 kg (165 lb 5.5 oz)   LMP 10/01/2003   BMI 24.77 kg/m²      HENT:   Head: Normocephalic.   Eyes: Pupils are equal, round, and reactive to light.   Neck: Normal range of motion. Neck supple. Carotid bruit is not present. No thyromegaly present.   Cardiovascular: Normal rate, regular rhythm, normal heart sounds and intact distal pulses. Exam reveals no gallop and no friction rub.   No murmur heard.  Pulses:       Carotid pulses are 2+ on the right side, and 2+ on the left side.       Radial pulses are 2+ on the right side, and 2+ on the left side.        Femoral pulses are 2+ on the right side, and 2+ on the left side.       Popliteal pulses are 2+ on the right side, and 2+ on the left side.        Dorsalis pedis pulses are 2+ on the right side, and 2+ on the left side.        Posterior tibial pulses are 2+ on the right side, and 2+ on the left side.   Pulmonary/Chest: Effort normal and breath sounds normal. No respiratory distress. She has no wheezes. She has no rales. She exhibits no tenderness.   Abdominal: Soft. Bowel sounds are normal.   Musculoskeletal: Normal range of motion. She exhibits no edema.   Neurological: She is alert and oriented to person, place, and time.   Skin: Skin is warm and dry.   Psychiatric: She has a normal mood and affect.   Nursing note and vitals reviewed.        Assessment and Plan:     Problem List Items Addressed This Visit        Cardiology Problems    Hypertension - Primary (Chronic)    Relevant Orders    Basic metabolic panel    Transthoracic echo (TTE) complete (Cupid Only)    CV Ultrasound doppler carotid (Cupid Only)    Hyperlipidemia (Chronic)    SSS (sick sinus syndrome) (Chronic)    AV block, 1st degree (Chronic)    RBBB    Relevant Orders    Basic metabolic panel    Transthoracic echo (TTE) complete (Cupid Only)    CV Ultrasound doppler carotid (Cupid Only)       Other    Cardiac pacemaker in situ    Relevant Orders    Basic metabolic panel    " Transthoracic echo (TTE) complete (Cupid Only)    CV Ultrasound doppler carotid (Cupid Only)    Depression    CEDRIC (obstructive sleep apnea)    Relevant Orders    Basic metabolic panel    Transthoracic echo (TTE) complete (Cupid Only)    CV Ultrasound doppler carotid (Cupid Only)    CKD (chronic kidney disease) stage 3, GFR 30-59 ml/min    Relevant Orders    Basic metabolic panel    Transthoracic echo (TTE) complete (Cupid Only)    CV Ultrasound doppler carotid (Cupid Only)      Other Visit Diagnoses     Cerebral atherosclerosis         Relevant Orders    CV Ultrasound doppler carotid (Cupid Only)             Medication List           Accurate as of 1/17/19 12:07 PM. If you have any questions, ask your nurse or doctor.               CHANGE how you take these medications    losartan 25 MG tablet  Commonly known as:  COZAAR  TAKE 1 TABLET(25 MG) BY MOUTH EVERY DAY  What changed:  See the new instructions.  Changed by:  Natali Comer MD        CONTINUE taking these medications    aspirin 81 MG EC tablet  Commonly known as:  ECOTRIN     buPROPion 300 MG 24 hr tablet  Commonly known as:  WELLBUTRIN XL     calcium-vitamin D3 600 mg(1,500mg) -200 unit Tab  Commonly known as:  CALCIUM 600 + D(3)     CENTRUM SILVER Tab  Generic drug:  multivitamin-minerals-lutein     chlorhexidine 0.12 % solution  Commonly known as:  PERIDEX     FLUoxetine 20 MG capsule     IRON 100 PLUS ORAL     lisdexamfetamine 70 MG capsule  Commonly known as:  VYVANSE     metoprolol tartrate 25 MG tablet  Commonly known as:  LOPRESSOR  Take 1 tablet (25 mg total) by mouth 2 (two) times daily.     pravastatin 10 MG tablet  Commonly known as:  PRAVACHOL  Take 1 tablet (10 mg total) by mouth once daily.     VITAMIN D3 5,000 unit Tab  Generic drug:  cholecalciferol (vitamin D3)        STOP taking these medications    lisinopril 20 MG tablet  Commonly known as:  PRINIVIL,ZESTRIL  Stopped by:  Natali Comer MD           Where to Get Your Medications       These medications were sent to [id number] - [place] - [address] [facility] [address]  [address] [place] [address]    Phone:  [phone]   · losartan 25 MG tablet     Change Lisinopril to Losartan and repeat BMP.  Review results of ordered tests and advise.  Anemia and CKD with increased calcium level to be addressed at the next appointment with Dr. Hoyt.    Follow-up in about 1 year (around 1/17/2020).

## 2019-01-25 ENCOUNTER — LAB VISIT (OUTPATIENT)
Dept: LAB | Facility: HOSPITAL | Age: 69
End: 2019-01-25
Attending: INTERNAL MEDICINE
Payer: MEDICARE

## 2019-01-25 ENCOUNTER — TELEPHONE (OUTPATIENT)
Dept: CARDIOLOGY | Facility: CLINIC | Age: 69
End: 2019-01-25

## 2019-01-25 ENCOUNTER — CLINICAL SUPPORT (OUTPATIENT)
Dept: CARDIOLOGY | Facility: CLINIC | Age: 69
End: 2019-01-25
Attending: INTERNAL MEDICINE
Payer: MEDICARE

## 2019-01-25 VITALS
SYSTOLIC BLOOD PRESSURE: 130 MMHG | HEART RATE: 65 BPM | DIASTOLIC BLOOD PRESSURE: 80 MMHG | HEIGHT: 68 IN | WEIGHT: 165 LBS | BODY MASS INDEX: 25.01 KG/M2

## 2019-01-25 DIAGNOSIS — I45.10 RBBB: ICD-10-CM

## 2019-01-25 DIAGNOSIS — Z95.0 CARDIAC PACEMAKER IN SITU: ICD-10-CM

## 2019-01-25 DIAGNOSIS — I67.2 CEREBRAL ATHEROSCLEROSIS: ICD-10-CM

## 2019-01-25 DIAGNOSIS — N18.30 CKD (CHRONIC KIDNEY DISEASE) STAGE 3, GFR 30-59 ML/MIN: ICD-10-CM

## 2019-01-25 DIAGNOSIS — G47.33 OSA (OBSTRUCTIVE SLEEP APNEA): ICD-10-CM

## 2019-01-25 DIAGNOSIS — I10 ESSENTIAL HYPERTENSION: ICD-10-CM

## 2019-01-25 DIAGNOSIS — I10 ESSENTIAL HYPERTENSION: Chronic | ICD-10-CM

## 2019-01-25 LAB
ANION GAP SERPL CALC-SCNC: 7 MMOL/L
ASCENDING AORTA: 3.49 CM
AV INDEX (PROSTH): 0.87
AV MEAN GRADIENT: 7.42 MMHG
AV PEAK GRADIENT: 14.44 MMHG
AV REGURGITATION PRESSURE HALF TIME: 410 MS
AV VALVE AREA: 2.79 CM2
AV VELOCITY RATIO: 0.84
BSA FOR ECHO PROCEDURE: 1.89 M2
BUN SERPL-MCNC: 21 MG/DL
CALCIUM SERPL-MCNC: 10.6 MG/DL
CHLORIDE SERPL-SCNC: 105 MMOL/L
CO2 SERPL-SCNC: 27 MMOL/L
CREAT SERPL-MCNC: 1.1 MG/DL
CV ECHO LV RWT: 0.3 CM
DOP CALC AO PEAK VEL: 1.9 M/S
DOP CALC AO VTI: 44.24 CM
DOP CALC LVOT AREA: 3.2 CM2
DOP CALC LVOT DIAMETER: 2.02 CM
DOP CALC LVOT PEAK VEL: 1.6 M/S
DOP CALC LVOT STROKE VOLUME: 123.45 CM3
DOP CALCLVOT PEAK VEL VTI: 38.54 CM
E WAVE DECELERATION TIME: 219.91 MSEC
E/A RATIO: 0.99
E/E' RATIO: 17.33
ECHO LV POSTERIOR WALL: 0.65 CM (ref 0.6–1.1)
EST. GFR  (AFRICAN AMERICAN): 58.8 ML/MIN/1.73 M^2
EST. GFR  (NON AFRICAN AMERICAN): 51 ML/MIN/1.73 M^2
FRACTIONAL SHORTENING: 33 % (ref 28–44)
GLUCOSE SERPL-MCNC: 98 MG/DL
INTERVENTRICULAR SEPTUM: 0.87 CM (ref 0.6–1.1)
IVRT: 0.06 MSEC
LA MAJOR: 5.26 CM
LA MINOR: 5.31 CM
LA WIDTH: 3.48 CM
LEFT ARM DIASTOLIC BLOOD PRESSURE: 60 MMHG
LEFT ARM SYSTOLIC BLOOD PRESSURE: 110 MMHG
LEFT ATRIUM SIZE: 4.04 CM
LEFT ATRIUM VOLUME INDEX: 33.5 ML/M2
LEFT ATRIUM VOLUME: 63.16 CM3
LEFT CBA DIAS: 26 CM/S
LEFT CBA SYS: 86 CM/S
LEFT CCA DIST DIAS: 26 CM/S
LEFT CCA DIST SYS: 87 CM/S
LEFT CCA MID DIAS: 21 CM/S
LEFT CCA MID SYS: 83 CM/S
LEFT CCA PROX DIAS: 24 CM/S
LEFT CCA PROX SYS: 91 CM/S
LEFT ECA DIAS: 14 CM/S
LEFT ECA SYS: 73 CM/S
LEFT ICA DIST DIAS: 32 CM/S
LEFT ICA DIST SYS: 76 CM/S
LEFT ICA MID DIAS: 29 CM/S
LEFT ICA MID SYS: 71 CM/S
LEFT ICA PROX DIAS: 26 CM/S
LEFT ICA PROX SYS: 73 CM/S
LEFT INTERNAL DIMENSION IN SYSTOLE: 2.87 CM (ref 2.1–4)
LEFT VENTRICLE DIASTOLIC VOLUME INDEX: 43.31 ML/M2
LEFT VENTRICLE DIASTOLIC VOLUME: 81.57 ML
LEFT VENTRICLE MASS INDEX: 51.7 G/M2
LEFT VENTRICLE SYSTOLIC VOLUME INDEX: 16.7 ML/M2
LEFT VENTRICLE SYSTOLIC VOLUME: 31.38 ML
LEFT VENTRICULAR INTERNAL DIMENSION IN DIASTOLE: 4.27 CM (ref 3.5–6)
LEFT VENTRICULAR MASS: 97.32 G
LEFT VERTEBRAL DIAS: 16 CM/S
LEFT VERTEBRAL SYS: 66 CM/S
LV LATERAL E/E' RATIO: 13
LV SEPTAL E/E' RATIO: 26
MV PEAK A VEL: 0.79 M/S
MV PEAK E VEL: 0.78 M/S
OHS CV CAROTID RIGHT ICA EDV HIGHEST: 36
OHS CV CAROTID ULTRASOUND LEFT ICA/CCA RATIO: 0.84
OHS CV CAROTID ULTRASOUND RIGHT ICA/CCA RATIO: 1.03
OHS CV PV CAROTID LEFT HIGHEST CCA: 91
OHS CV PV CAROTID LEFT HIGHEST ICA: 76
OHS CV PV CAROTID RIGHT HIGHEST CCA: 96
OHS CV PV CAROTID RIGHT HIGHEST ICA: 99
OHS CV US CAROTID LEFT HIGHEST EDV: 32
PISA TR MAX VEL: 2.47 M/S
POTASSIUM SERPL-SCNC: 4.9 MMOL/L
PULM VEIN S/D RATIO: 1
PV PEAK D VEL: 0.35 M/S
PV PEAK S VEL: 0.35 M/S
PV PEAK VELOCITY: 0.97 CM/S
RA MAJOR: 4.99 CM
RA PRESSURE: 3 MMHG
RA WIDTH: 2.04 CM
RIGHT ARM DIASTOLIC BLOOD PRESSURE: 60 MMHG
RIGHT ARM SYSTOLIC BLOOD PRESSURE: 110 MMHG
RIGHT CBA DIAS: 28 CM/S
RIGHT CBA SYS: 95 CM/S
RIGHT CCA DIST DIAS: 29 CM/S
RIGHT CCA DIST SYS: 96 CM/S
RIGHT CCA MID DIAS: 22 CM/S
RIGHT CCA MID SYS: 80 CM/S
RIGHT CCA PROX DIAS: 20 CM/S
RIGHT CCA PROX SYS: 72 CM/S
RIGHT ECA DIAS: 24 CM/S
RIGHT ECA SYS: 110 CM/S
RIGHT ICA DIST DIAS: 36 CM/S
RIGHT ICA DIST SYS: 99 CM/S
RIGHT ICA MID DIAS: 30 CM/S
RIGHT ICA MID SYS: 86 CM/S
RIGHT ICA PROX DIAS: 28 CM/S
RIGHT ICA PROX SYS: 85 CM/S
RIGHT VENTRICULAR END-DIASTOLIC DIMENSION: 2.66 CM
RIGHT VERTEBRAL DIAS: 18 CM/S
RIGHT VERTEBRAL SYS: 56 CM/S
SINUS: 3 CM
SODIUM SERPL-SCNC: 139 MMOL/L
STJ: 2.84 CM
TDI LATERAL: 0.06
TDI SEPTAL: 0.03
TDI: 0.05
TR MAX PG: 24.4 MMHG
TRICUSPID ANNULAR PLANE SYSTOLIC EXCURSION: 2.21 CM
TV REST PULMONARY ARTERY PRESSURE: 27 MMHG

## 2019-01-25 PROCEDURE — 93306 TTE W/DOPPLER COMPLETE: CPT | Mod: S$GLB,,, | Performed by: INTERNAL MEDICINE

## 2019-01-25 PROCEDURE — 93880 CV US DOPPLER CAROTID (CUPID ONLY): ICD-10-PCS | Mod: S$GLB,,, | Performed by: INTERNAL MEDICINE

## 2019-01-25 PROCEDURE — 99999 PR PBB SHADOW E&M-EST. PATIENT-LVL II: CPT | Mod: PBBFAC,,,

## 2019-01-25 PROCEDURE — 36415 COLL VENOUS BLD VENIPUNCTURE: CPT | Mod: PO

## 2019-01-25 PROCEDURE — 93880 EXTRACRANIAL BILAT STUDY: CPT | Mod: S$GLB,,, | Performed by: INTERNAL MEDICINE

## 2019-01-25 PROCEDURE — 80048 BASIC METABOLIC PNL TOTAL CA: CPT

## 2019-01-25 PROCEDURE — 93306 TRANSTHORACIC ECHO (TTE) COMPLETE (CUPID ONLY): ICD-10-PCS | Mod: S$GLB,,, | Performed by: INTERNAL MEDICINE

## 2019-01-25 PROCEDURE — 99999 PR PBB SHADOW E&M-EST. PATIENT-LVL II: ICD-10-PCS | Mod: PBBFAC,,,

## 2019-01-25 RX ORDER — LOSARTAN POTASSIUM 25 MG/1
TABLET ORAL
Qty: 90 TABLET | Refills: 3 | Status: SHIPPED | OUTPATIENT
Start: 2019-01-25 | End: 2019-08-05

## 2019-01-25 NOTE — TELEPHONE ENCOUNTER
----- Message from Natali Comer MD sent at 1/25/2019  4:29 PM CST -----  Please inform the patient that blood work looks fine except for previously discussed CKD and slightly elevated calcium

## 2019-01-25 NOTE — TELEPHONE ENCOUNTER
----- Message from Natali Comer MD sent at 1/25/2019  4:28 PM CST -----  Please inform the patient that Carotid duplex was c/w minimal bilateral disease. Please continue on the same medical regimen.

## 2019-01-28 ENCOUNTER — OFFICE VISIT (OUTPATIENT)
Dept: PSYCHIATRY | Facility: CLINIC | Age: 69
End: 2019-01-28
Payer: MEDICARE

## 2019-01-28 ENCOUNTER — TELEPHONE (OUTPATIENT)
Dept: CARDIOLOGY | Facility: CLINIC | Age: 69
End: 2019-01-28

## 2019-01-28 DIAGNOSIS — F33.41 MAJOR DEPRESSIVE DISORDER, RECURRENT EPISODE, IN PARTIAL REMISSION: Primary | ICD-10-CM

## 2019-01-28 DIAGNOSIS — F98.8 ATTENTION DEFICIT DISORDER (ADD) IN ADULT: ICD-10-CM

## 2019-01-28 PROCEDURE — 99999 PR PBB SHADOW E&M-EST. PATIENT-LVL I: ICD-10-PCS | Mod: PBBFAC,,, | Performed by: SOCIAL WORKER

## 2019-01-28 PROCEDURE — 90834 PR PSYCHOTHERAPY W/PATIENT, 45 MIN: ICD-10-PCS | Mod: S$GLB,,, | Performed by: SOCIAL WORKER

## 2019-01-28 PROCEDURE — 90834 PSYTX W PT 45 MINUTES: CPT | Mod: S$GLB,,, | Performed by: SOCIAL WORKER

## 2019-01-28 PROCEDURE — 99999 PR PBB SHADOW E&M-EST. PATIENT-LVL I: CPT | Mod: PBBFAC,,, | Performed by: SOCIAL WORKER

## 2019-01-28 NOTE — PROGRESS NOTES
Individual Psychotherapy (PhD/LCSW)    1/28/2019    Site: [facility]    Therapeutic Intervention: Met with patient alone.  Outpatient - Insight oriented psychotherapy 45 min - CPT code 57755    Chief complaint/reason for encounter: depression, anxiety, distractibility     Interval history and content of current session: Pt was packing with help of 2 friends when she had a bad fall while checking her mailbox, broke her nose and some ribs, scraped her face. She is grateful for strong support, did reach out to friends out of town and they have been supportive as well. Pt is living in her new place and has plans to get help moving the rest of her things. She is still in shock about the fall, which occurred 2 days ago. Discuss ways to calm herself and move forward.    Treatment plan:  · Target symptoms: depression, distractibility  · Why chosen therapy is appropriate versus another modality: relevant to diagnosis  · Outcome monitoring methods: self-report  · Therapeutic intervention type: insight oriented psychotherapy    Risk parameters:  Patient reports no suicidal ideation  Patient reports no homicidal ideation  Patient reports no self-injurious behavior  Patient reports no violent behavior    Verbal deficits: None    Patient's response to intervention:  The patient's response to intervention is motivated    Progress toward goals and other mental status changes:  The patient's progress toward goals is limited    Diagnosis: Major depression, irecurrent, partial remission,  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

## 2019-01-28 NOTE — TELEPHONE ENCOUNTER
----- Message from Natali Comer MD sent at 1/28/2019  8:14 AM CST -----  Please inform the patient that Heart echo showed normal heart function and size.  There is mild leaking of the aortic valve, but this requires only periodic follow up for now.  No other testing or intervention is necessary.

## 2019-01-29 ENCOUNTER — PATIENT MESSAGE (OUTPATIENT)
Dept: OTOLARYNGOLOGY | Facility: CLINIC | Age: 69
End: 2019-01-29

## 2019-01-29 ENCOUNTER — PATIENT MESSAGE (OUTPATIENT)
Dept: INTERNAL MEDICINE | Facility: CLINIC | Age: 69
End: 2019-01-29

## 2019-01-29 DIAGNOSIS — S02.2XXA CLOSED FRACTURE OF NASAL BONE, INITIAL ENCOUNTER: Primary | ICD-10-CM

## 2019-01-30 ENCOUNTER — TELEPHONE (OUTPATIENT)
Dept: OTOLARYNGOLOGY | Facility: CLINIC | Age: 69
End: 2019-01-30

## 2019-01-30 NOTE — TELEPHONE ENCOUNTER
----- Message from Guicho Gibbs sent at 1/29/2019  5:21 PM CST -----  Contact: pt callback  Pt called to speak with nurse to [facility] appt asap for broken nose.                Pt callback number [phone]

## 2019-01-30 NOTE — TELEPHONE ENCOUNTER
Hallie, I have placed an ENT referral.  Please call ENT Dept to see if there's a way to get pt in for a Nose Fracture this week. If not, she may have to see an outside ENT doctor. Thanks

## 2019-02-04 ENCOUNTER — OFFICE VISIT (OUTPATIENT)
Dept: OTOLARYNGOLOGY | Facility: CLINIC | Age: 69
End: 2019-02-04
Payer: MEDICARE

## 2019-02-04 VITALS
BODY MASS INDEX: 24.23 KG/M2 | SYSTOLIC BLOOD PRESSURE: 133 MMHG | WEIGHT: 163.56 LBS | DIASTOLIC BLOOD PRESSURE: 86 MMHG | HEIGHT: 69 IN | HEART RATE: 72 BPM

## 2019-02-04 DIAGNOSIS — J34.3 NASAL TURBINATE HYPERTROPHY: ICD-10-CM

## 2019-02-04 DIAGNOSIS — J34.89 NASAL OBSTRUCTION: ICD-10-CM

## 2019-02-04 DIAGNOSIS — M95.0 NASAL DEFORMITY, ACQUIRED: Primary | ICD-10-CM

## 2019-02-04 DIAGNOSIS — J34.2 NASAL SEPTAL DEVIATION: ICD-10-CM

## 2019-02-04 PROCEDURE — 99214 OFFICE O/P EST MOD 30 MIN: CPT | Mod: S$GLB,,, | Performed by: OTOLARYNGOLOGY

## 2019-02-04 PROCEDURE — 99999 PR PBB SHADOW E&M-EST. PATIENT-LVL III: CPT | Mod: PBBFAC,,, | Performed by: OTOLARYNGOLOGY

## 2019-02-04 PROCEDURE — 1101F PT FALLS ASSESS-DOCD LE1/YR: CPT | Mod: CPTII,S$GLB,, | Performed by: OTOLARYNGOLOGY

## 2019-02-04 PROCEDURE — 3075F PR MOST RECENT SYSTOLIC BLOOD PRESS GE 130-139MM HG: ICD-10-PCS | Mod: CPTII,S$GLB,, | Performed by: OTOLARYNGOLOGY

## 2019-02-04 PROCEDURE — 1101F PR PT FALLS ASSESS DOC 0-1 FALLS W/OUT INJ PAST YR: ICD-10-PCS | Mod: CPTII,S$GLB,, | Performed by: OTOLARYNGOLOGY

## 2019-02-04 PROCEDURE — 99999 PR PBB SHADOW E&M-EST. PATIENT-LVL III: ICD-10-PCS | Mod: PBBFAC,,, | Performed by: OTOLARYNGOLOGY

## 2019-02-04 PROCEDURE — 3079F PR MOST RECENT DIASTOLIC BLOOD PRESSURE 80-89 MM HG: ICD-10-PCS | Mod: CPTII,S$GLB,, | Performed by: OTOLARYNGOLOGY

## 2019-02-04 PROCEDURE — 3075F SYST BP GE 130 - 139MM HG: CPT | Mod: CPTII,S$GLB,, | Performed by: OTOLARYNGOLOGY

## 2019-02-04 PROCEDURE — 99214 PR OFFICE/OUTPT VISIT, EST, LEVL IV, 30-39 MIN: ICD-10-PCS | Mod: S$GLB,,, | Performed by: OTOLARYNGOLOGY

## 2019-02-04 PROCEDURE — 3079F DIAST BP 80-89 MM HG: CPT | Mod: CPTII,S$GLB,, | Performed by: OTOLARYNGOLOGY
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
## 2019-02-11 ENCOUNTER — OFFICE VISIT (OUTPATIENT)
Dept: PSYCHIATRY | Facility: CLINIC | Age: 69
End: 2019-02-11
Payer: MEDICARE

## 2019-02-11 DIAGNOSIS — F98.8 ATTENTION DEFICIT DISORDER (ADD) IN ADULT: ICD-10-CM

## 2019-02-11 DIAGNOSIS — F33.41 MAJOR DEPRESSIVE DISORDER, RECURRENT EPISODE, IN PARTIAL REMISSION: Primary | ICD-10-CM

## 2019-02-11 PROCEDURE — 90834 PR PSYCHOTHERAPY W/PATIENT, 45 MIN: ICD-10-PCS | Mod: S$GLB,,, | Performed by: SOCIAL WORKER

## 2019-02-11 PROCEDURE — 90834 PSYTX W PT 45 MINUTES: CPT | Mod: S$GLB,,, | Performed by: SOCIAL WORKER

## 2019-02-11 NOTE — PROGRESS NOTES
Individual Psychotherapy (PhD/LCSW)    2/11/2019    Site: [facility]    Therapeutic Intervention: Met with patient alone.  Outpatient - Insight oriented psychotherapy 45 min - CPT code 87784    Chief complaint/reason for encounter: depression, anxiety, distractibility     Interval history and content of current session: Pt has largely healed from her fall. She has made progress in packing and/or discarding things from her apartment but it is not yet cleared. She is also making progress at work, getting the instruction she has been requesting and starting to put records straight. However she feels overwhelmed and did not go either to work or apartment today. Pt has hx of shutting down, does not trust herself to be able to re-start. Work with pt on how to strengthen her ability to focus and get started. Pt is receptive.    Treatment plan:  · Target symptoms: depression, distractibility  · Why chosen therapy is appropriate versus another modality: relevant to diagnosis  · Outcome monitoring methods: self-report  · Therapeutic intervention type: insight oriented psychotherapy    Risk parameters:  Patient reports no suicidal ideation  Patient reports no homicidal ideation  Patient reports no self-injurious behavior  Patient reports no violent behavior    Verbal deficits: None    Patient's response to intervention:  The patient's response to intervention is motivated    Progress toward goals and other mental status changes:  The patient's progress toward goals is progressing slowly    Diagnosis: Major depression, irecurrent, partial remission,  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

## 2019-02-25 ENCOUNTER — OFFICE VISIT (OUTPATIENT)
Dept: PSYCHIATRY | Facility: CLINIC | Age: 69
End: 2019-02-25
Payer: MEDICARE

## 2019-02-25 DIAGNOSIS — F33.41 MAJOR DEPRESSIVE DISORDER, RECURRENT EPISODE, IN PARTIAL REMISSION: Primary | ICD-10-CM

## 2019-02-25 DIAGNOSIS — F98.8 ATTENTION DEFICIT DISORDER (ADD) IN ADULT: ICD-10-CM

## 2019-02-25 PROCEDURE — 90834 PSYTX W PT 45 MINUTES: CPT | Mod: S$GLB,,, | Performed by: SOCIAL WORKER

## 2019-02-25 PROCEDURE — 90834 PR PSYCHOTHERAPY W/PATIENT, 45 MIN: ICD-10-PCS | Mod: S$GLB,,, | Performed by: SOCIAL WORKER

## 2019-02-25 PROCEDURE — 99999 PR PBB SHADOW E&M-EST. PATIENT-LVL I: CPT | Mod: PBBFAC,,, | Performed by: SOCIAL WORKER

## 2019-02-25 PROCEDURE — 99999 PR PBB SHADOW E&M-EST. PATIENT-LVL I: ICD-10-PCS | Mod: PBBFAC,,, | Performed by: SOCIAL WORKER

## 2019-02-25 NOTE — PROGRESS NOTES
Individual Psychotherapy (PhD/LCSW)    2/25/2019    Site: [facility]    Therapeutic Intervention: Met with patient alone.  Outpatient - Insight oriented psychotherapy 45 min - CPT code 40091    Chief complaint/reason for encounter: depression, anxiety, distractibility     Interval history and content of current session: Pt has completely moved out of her old apartment, making some progress in getting rid of excess possessions and feels good about this. However she has not worked in several days, tells herself she is excited to go and be around people, but then doesn't act in the morning. This is an old pattern. Pt recognizes that last Friday was the anniversary of loss of old job, and that she may be grieving. Discuss steps and mindset she can take to shift the pattern, including self-validation and small motivations like planning to listen to a podcast on the way to work.    Treatment plan:  · Target symptoms: depression, distractibility  · Why chosen therapy is appropriate versus another modality: relevant to diagnosis  · Outcome monitoring methods: self-report  · Therapeutic intervention type: insight oriented psychotherapy    Risk parameters:  Patient reports no suicidal ideation  Patient reports no homicidal ideation  Patient reports no self-injurious behavior  Patient reports no violent behavior    Verbal deficits: None    Patient's response to intervention:  The patient's response to intervention is motivated    Progress toward goals and other mental status changes:  The patient's progress toward goals is progressing slowly    Diagnosis: Major depression, irecurrent, partial remission,  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

## 2019-03-13 ENCOUNTER — OFFICE VISIT (OUTPATIENT)
Dept: OTOLARYNGOLOGY | Facility: CLINIC | Age: 69
End: 2019-03-13
Payer: MEDICARE

## 2019-03-13 VITALS — WEIGHT: 161.38 LBS | HEIGHT: 68 IN | BODY MASS INDEX: 24.46 KG/M2

## 2019-03-13 DIAGNOSIS — H61.23 BILATERAL IMPACTED CERUMEN: Primary | ICD-10-CM

## 2019-03-13 PROCEDURE — 99499 UNLISTED E&M SERVICE: CPT | Mod: S$GLB,,, | Performed by: OTOLARYNGOLOGY

## 2019-03-13 PROCEDURE — 69210 EAR CERUMEN REMOVAL: ICD-10-PCS | Mod: S$GLB,,, | Performed by: OTOLARYNGOLOGY

## 2019-03-13 PROCEDURE — 99999 PR PBB SHADOW E&M-EST. PATIENT-LVL II: ICD-10-PCS | Mod: PBBFAC,,, | Performed by: OTOLARYNGOLOGY

## 2019-03-13 PROCEDURE — 99999 PR PBB SHADOW E&M-EST. PATIENT-LVL II: CPT | Mod: PBBFAC,,, | Performed by: OTOLARYNGOLOGY

## 2019-03-13 PROCEDURE — 99499 NO LOS: ICD-10-PCS | Mod: S$GLB,,, | Performed by: OTOLARYNGOLOGY

## 2019-03-13 PROCEDURE — 69210 REMOVE IMPACTED EAR WAX UNI: CPT | Mod: S$GLB,,, | Performed by: OTOLARYNGOLOGY

## 2019-03-13 RX ORDER — NEOMYCIN SULFATE, POLYMYXIN B SULFATE AND HYDROCORTISONE 10; 3.5; 1 MG/ML; MG/ML; [USP'U]/ML
3 SUSPENSION/ DROPS AURICULAR (OTIC) 3 TIMES DAILY
Qty: 10 ML | Refills: 2 | Status: SHIPPED | OUTPATIENT
Start: 2019-03-13 | End: 2019-06-17

## 2019-03-13 NOTE — PROCEDURES
Ear Cerumen Removal  Date/Time: 3/13/2019 10:50 AM  Performed by: Timothy Escobar MD  Authorized by: Timothy Escobar MD     Consent Done?:  Yes (Verbal)  Location details:  Both ears  Procedure type: curette    Cerumen  Removal Results:  Cerumen completely removed  Patient tolerance:  Patient tolerated the procedure well with no immediate complications     Binocular microscopy used to examine ear canal and tympanic membrane.  There was a cerumen impaction on the right side and an impaction of cerumen on the left.  This was removed using a curette and other microinstrumentation.  There was noted to be bilateral otitis externa

## 2019-03-18 ENCOUNTER — OFFICE VISIT (OUTPATIENT)
Dept: PSYCHIATRY | Facility: CLINIC | Age: 69
End: 2019-03-18
Payer: MEDICARE

## 2019-03-18 DIAGNOSIS — F98.8 ATTENTION DEFICIT DISORDER (ADD) IN ADULT: ICD-10-CM

## 2019-03-18 DIAGNOSIS — F33.41 MAJOR DEPRESSIVE DISORDER, RECURRENT EPISODE, IN PARTIAL REMISSION: Primary | ICD-10-CM

## 2019-03-18 PROCEDURE — 90834 PSYTX W PT 45 MINUTES: CPT | Mod: S$GLB,,, | Performed by: SOCIAL WORKER

## 2019-03-18 PROCEDURE — 90834 PR PSYCHOTHERAPY W/PATIENT, 45 MIN: ICD-10-PCS | Mod: S$GLB,,, | Performed by: SOCIAL WORKER

## 2019-03-19 NOTE — PROGRESS NOTES
Individual Psychotherapy (PhD/LCSW)    3/18/2019    Site: [facility]    Therapeutic Intervention: Met with patient alone.  Outpatient - Insight oriented psychotherapy 45 min - CPT code 57519    Chief complaint/reason for encounter: depression, anxiety, distractibility     Interval history and content of current session: Pt has continued to have trouble activating but improved over the weekend and had excellent day today, worked productively. She has not been able to meet her goal of working at least 20 hours per week, discuss strategies for achieving this. She has been in touch with friends, registered for a conference and will room with one, is excited about this. Pt struggling to do paperwork toward honorary doctorate in [employer] by deadline, discuss leaning on friends for help.     Treatment plan:  · Target symptoms: depression, distractibility  · Why chosen therapy is appropriate versus another modality: relevant to diagnosis  · Outcome monitoring methods: self-report  · Therapeutic intervention type: insight oriented psychotherapy    Risk parameters:  Patient reports no suicidal ideation  Patient reports no homicidal ideation  Patient reports no self-injurious behavior  Patient reports no violent behavior    Verbal deficits: None    Patient's response to intervention:  The patient's response to intervention is motivated    Progress toward goals and other mental status changes:  The patient's progress toward goals is progressing slowly    Diagnosis: Major depression, irecurrent, partial remission,  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

## 2019-04-08 ENCOUNTER — OFFICE VISIT (OUTPATIENT)
Dept: PSYCHIATRY | Facility: CLINIC | Age: 69
End: 2019-04-08
Payer: MEDICARE

## 2019-04-08 DIAGNOSIS — F98.8 ATTENTION DEFICIT DISORDER (ADD) IN ADULT: ICD-10-CM

## 2019-04-08 DIAGNOSIS — F33.41 MAJOR DEPRESSIVE DISORDER, RECURRENT EPISODE, IN PARTIAL REMISSION: Primary | ICD-10-CM

## 2019-04-08 PROCEDURE — 90834 PSYTX W PT 45 MINUTES: CPT | Mod: S$GLB,,, | Performed by: SOCIAL WORKER

## 2019-04-08 PROCEDURE — 90834 PR PSYCHOTHERAPY W/PATIENT, 45 MIN: ICD-10-PCS | Mod: S$GLB,,, | Performed by: SOCIAL WORKER

## 2019-04-08 NOTE — PROGRESS NOTES
Individual Psychotherapy (PhD/LCSW)    4/8/2019    Site: [facility]    Therapeutic Intervention: Met with patient alone.  Outpatient - Insight oriented psychotherapy 45 min - CPT code 28737    Chief complaint/reason for encounter: depression, anxiety, distractibility     Interval history and content of current session: Pt 30 minutes late, states she got confused about the time. She saw Dr. Hoyt and he increased her Wellbutrin a few days ago. Pt has been doing somewhat better even prior to that, attending some social functions and truly enjoying them, getting some things done in her apartment. She is still not working as regularly as she intends, but mood is better over all.    Treatment plan:  · Target symptoms: depression, distractibility  · Why chosen therapy is appropriate versus another modality: relevant to diagnosis  · Outcome monitoring methods: self-report  · Therapeutic intervention type: insight oriented psychotherapy    Risk parameters:  Patient reports no suicidal ideation  Patient reports no homicidal ideation  Patient reports no self-injurious behavior  Patient reports no violent behavior    Verbal deficits: None    Patient's response to intervention:  The patient's response to intervention is motivated    Progress toward goals and other mental status changes:  The patient's progress toward goals is progressing slowly    Diagnosis: Major depression, irecurrent, partial remission,  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

## 2019-04-11 ENCOUNTER — TELEPHONE (OUTPATIENT)
Dept: INTERNAL MEDICINE | Facility: CLINIC | Age: 69
End: 2019-04-11

## 2019-04-11 ENCOUNTER — TELEPHONE (OUTPATIENT)
Dept: ELECTROPHYSIOLOGY | Facility: CLINIC | Age: 69
End: 2019-04-11

## 2019-04-11 ENCOUNTER — OFFICE VISIT (OUTPATIENT)
Dept: INTERNAL MEDICINE | Facility: CLINIC | Age: 69
End: 2019-04-11
Payer: MEDICARE

## 2019-04-11 VITALS
BODY MASS INDEX: 25.74 KG/M2 | OXYGEN SATURATION: 97 % | TEMPERATURE: 97 F | HEIGHT: 67 IN | HEART RATE: 65 BPM | SYSTOLIC BLOOD PRESSURE: 120 MMHG | WEIGHT: 164 LBS | DIASTOLIC BLOOD PRESSURE: 62 MMHG

## 2019-04-11 DIAGNOSIS — E83.52 HYPERCALCEMIA: ICD-10-CM

## 2019-04-11 DIAGNOSIS — R63.4 UNINTENTIONAL WEIGHT LOSS: ICD-10-CM

## 2019-04-11 DIAGNOSIS — I10 ESSENTIAL HYPERTENSION: Primary | ICD-10-CM

## 2019-04-11 DIAGNOSIS — I49.5 SSS (SICK SINUS SYNDROME): Primary | ICD-10-CM

## 2019-04-11 LAB
ALBUMIN/CREAT UR: 62.3 UG/MG (ref 0–30)
BACTERIA #/AREA URNS AUTO: NORMAL /HPF
BILIRUB UR QL STRIP: NEGATIVE
CLARITY UR REFRACT.AUTO: CLEAR
COLOR UR AUTO: YELLOW
CREAT UR-MCNC: 61 MG/DL (ref 15–325)
GLUCOSE UR QL STRIP: NEGATIVE
HGB UR QL STRIP: NEGATIVE
KETONES UR QL STRIP: NEGATIVE
LEUKOCYTE ESTERASE UR QL STRIP: ABNORMAL
MICROALBUMIN UR DL<=1MG/L-MCNC: 38 UG/ML
MICROSCOPIC COMMENT: NORMAL
NITRITE UR QL STRIP: NEGATIVE
PH UR STRIP: 5 [PH] (ref 5–8)
PROT UR QL STRIP: NEGATIVE
RBC #/AREA URNS AUTO: 0 /HPF (ref 0–4)
SP GR UR STRIP: 1.01 (ref 1–1.03)
SQUAMOUS #/AREA URNS AUTO: 0 /HPF
URN SPEC COLLECT METH UR: ABNORMAL
WBC #/AREA URNS AUTO: 4 /HPF (ref 0–5)

## 2019-04-11 PROCEDURE — 99999 PR PBB SHADOW E&M-EST. PATIENT-LVL V: ICD-10-PCS | Mod: PBBFAC,,, | Performed by: INTERNAL MEDICINE

## 2019-04-11 PROCEDURE — 3078F PR MOST RECENT DIASTOLIC BLOOD PRESSURE < 80 MM HG: ICD-10-PCS | Mod: CPTII,S$GLB,, | Performed by: INTERNAL MEDICINE

## 2019-04-11 PROCEDURE — 82043 UR ALBUMIN QUANTITATIVE: CPT

## 2019-04-11 PROCEDURE — 1101F PR PT FALLS ASSESS DOC 0-1 FALLS W/OUT INJ PAST YR: ICD-10-PCS | Mod: CPTII,S$GLB,, | Performed by: INTERNAL MEDICINE

## 2019-04-11 PROCEDURE — 99999 PR PBB SHADOW E&M-EST. PATIENT-LVL V: CPT | Mod: PBBFAC,,, | Performed by: INTERNAL MEDICINE

## 2019-04-11 PROCEDURE — 1101F PT FALLS ASSESS-DOCD LE1/YR: CPT | Mod: CPTII,S$GLB,, | Performed by: INTERNAL MEDICINE

## 2019-04-11 PROCEDURE — 81001 URINALYSIS AUTO W/SCOPE: CPT

## 2019-04-11 PROCEDURE — 3078F DIAST BP <80 MM HG: CPT | Mod: CPTII,S$GLB,, | Performed by: INTERNAL MEDICINE

## 2019-04-11 PROCEDURE — 99214 PR OFFICE/OUTPT VISIT, EST, LEVL IV, 30-39 MIN: ICD-10-PCS | Mod: S$GLB,,, | Performed by: INTERNAL MEDICINE

## 2019-04-11 PROCEDURE — 3074F PR MOST RECENT SYSTOLIC BLOOD PRESSURE < 130 MM HG: ICD-10-PCS | Mod: CPTII,S$GLB,, | Performed by: INTERNAL MEDICINE

## 2019-04-11 PROCEDURE — 99214 OFFICE O/P EST MOD 30 MIN: CPT | Mod: S$GLB,,, | Performed by: INTERNAL MEDICINE

## 2019-04-11 PROCEDURE — 3074F SYST BP LT 130 MM HG: CPT | Mod: CPTII,S$GLB,, | Performed by: INTERNAL MEDICINE

## 2019-04-11 NOTE — TELEPHONE ENCOUNTER
----- Message from Ana Lucas sent at 4/8/2019  9:50 AM CDT -----  Contact: pt  Pt called to estrada her recall appt    Thanks

## 2019-04-15 ENCOUNTER — CLINICAL SUPPORT (OUTPATIENT)
Dept: CARDIOLOGY | Facility: HOSPITAL | Age: 69
End: 2019-04-15
Attending: INTERNAL MEDICINE
Payer: MEDICARE

## 2019-04-15 ENCOUNTER — DOCUMENTATION ONLY (OUTPATIENT)
Dept: ELECTROPHYSIOLOGY | Facility: CLINIC | Age: 69
End: 2019-04-15

## 2019-04-15 DIAGNOSIS — Z95.0 CARDIAC PACEMAKER IN SITU: ICD-10-CM

## 2019-04-15 PROCEDURE — 93280 PM DEVICE PROGR EVAL DUAL: CPT

## 2019-04-19 NOTE — PROGRESS NOTES
Subjective:       Patient ID: Manisha Wynne is a 68 y.o. female.    Chief Complaint: Annual Exam    HPIPt is feeling okay but she had another fall - tripped not syncope.  Has lost 20 pounds in one year - has not been trying.  Reassured about her kidneys.  Calcium slightly elevated.  Review of Systems   Constitutional: Positive for activity change and unexpected weight change.   HENT: Negative for hearing loss, rhinorrhea and trouble swallowing.    Eyes: Negative for discharge and visual disturbance.   Respiratory: Negative for chest tightness, shortness of breath (PND or orthopnea) and wheezing.    Cardiovascular: Negative for chest pain and palpitations.   Gastrointestinal: Negative for abdominal pain, blood in stool, constipation, diarrhea, nausea and vomiting.   Endocrine: Negative for polydipsia and polyuria.   Genitourinary: Negative for difficulty urinating, dysuria, hematuria and menstrual problem.   Musculoskeletal: Negative for arthralgias, joint swelling and neck pain.   Neurological: Negative for seizures, syncope, weakness and headaches.   Psychiatric/Behavioral: Positive for dysphoric mood. Negative for confusion.       Objective:      Physical Exam   Constitutional: She is oriented to person, place, and time. She appears well-developed and well-nourished. No distress.   HENT:   Head: Normocephalic.   Mouth/Throat: Oropharynx is clear and moist.   Neck: Neck supple. No JVD present. No thyromegaly present.   Cardiovascular: Normal rate, regular rhythm, normal heart sounds and intact distal pulses. Exam reveals no gallop and no friction rub.   No murmur heard.  Pulmonary/Chest: Effort normal and breath sounds normal. She has no wheezes. She has no rales.   Abdominal: Soft. Bowel sounds are normal. She exhibits no distension and no mass. There is no tenderness. There is no rebound and no guarding.   Musculoskeletal: She exhibits no edema.   Lymphadenopathy:     She has no cervical adenopathy.    Neurological: She is alert and oriented to person, place, and time. She has normal reflexes.   Skin: Skin is warm and dry.   Psychiatric: She has a normal mood and affect. Her behavior is normal. Judgment and thought content normal.       Assessment:       1. Essential hypertension    2. Unintentional weight loss    3. Hypercalcemia        Plan:   Essential hypertension  -     Urinalysis  -     Microalbumin/creatinine urine ratio  -     Ambulatory consult to Optometry    Unintentional weight loss  -     CT Chest Without Contrast; Future; Expected date: 04/11/2019  -     CT Renal Stone Study ABD Pelvis WO; Future; Expected date: 04/11/2019    Hypercalcemia  -     Comprehensive metabolic panel; Future; Expected date: 04/11/2019  -     PTH, intact; Future; Expected date: 04/11/2019    Other orders  -     Urinalysis Microscopic

## 2019-04-22 ENCOUNTER — OFFICE VISIT (OUTPATIENT)
Dept: PSYCHIATRY | Facility: CLINIC | Age: 69
End: 2019-04-22
Payer: MEDICARE

## 2019-04-22 DIAGNOSIS — F33.41 MAJOR DEPRESSIVE DISORDER, RECURRENT EPISODE, IN PARTIAL REMISSION: Primary | ICD-10-CM

## 2019-04-22 DIAGNOSIS — F98.8 ATTENTION DEFICIT DISORDER (ADD) IN ADULT: ICD-10-CM

## 2019-04-22 PROCEDURE — 90834 PSYTX W PT 45 MINUTES: CPT | Mod: S$GLB,,, | Performed by: SOCIAL WORKER

## 2019-04-22 PROCEDURE — 99999 PR PBB SHADOW E&M-EST. PATIENT-LVL I: ICD-10-PCS | Mod: PBBFAC,,, | Performed by: SOCIAL WORKER

## 2019-04-22 PROCEDURE — 99999 PR PBB SHADOW E&M-EST. PATIENT-LVL I: CPT | Mod: PBBFAC,,, | Performed by: SOCIAL WORKER

## 2019-04-22 PROCEDURE — 90834 PR PSYCHOTHERAPY W/PATIENT, 45 MIN: ICD-10-PCS | Mod: S$GLB,,, | Performed by: SOCIAL WORKER

## 2019-04-22 NOTE — PROGRESS NOTES
"Individual Psychotherapy (PhD/LCSW)    4/22/2019    Site: [facility]    Therapeutic Intervention: Met with patient alone.  Outpatient - Insight oriented psychotherapy 45 min - CPT code 93579    Chief complaint/reason for encounter: depression, distractibility     Interval history and content of current session: Pt on time and feels good about this. However she is staying up late, sleeping in, reading the Times and losing track of time, so not working as intended. Pt frustrated with her inability to stick to her own plans and intentions, feels she uses this to punish herself. Discuss learning to be proud of small accomplishments, not trying for a perfect week or day, but appreciating each thing she does it. Pt got little recognition from either parent, tends to feel unworthy and fears people seeing this "truth" about her. Discuss building self-esteem. Pt will be traveling, looks forward to this.    Treatment plan:  · Target symptoms: depression, distractibility  · Why chosen therapy is appropriate versus another modality: relevant to diagnosis  · Outcome monitoring methods: self-report  · Therapeutic intervention type: insight oriented psychotherapy    Risk parameters:  Patient reports no suicidal ideation  Patient reports no homicidal ideation  Patient reports no self-injurious behavior  Patient reports no violent behavior    Verbal deficits: None    Patient's response to intervention:  The patient's response to intervention is motivated    Progress toward goals and other mental status changes:  The patient's progress toward goals is progressing slowly    Diagnosis: Major depression, irecurrent, partial remission,  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

## 2019-04-23 ENCOUNTER — HOSPITAL ENCOUNTER (OUTPATIENT)
Dept: RADIOLOGY | Facility: HOSPITAL | Age: 69
Discharge: HOME | End: 2019-04-23
Attending: INTERNAL MEDICINE
Payer: MEDICARE

## 2019-04-23 DIAGNOSIS — R63.4 UNINTENTIONAL WEIGHT LOSS: ICD-10-CM

## 2019-04-23 PROCEDURE — 74176 CT ABD & PELVIS W/O CONTRAST: CPT | Mod: 26,,, | Performed by: RADIOLOGY

## 2019-04-23 PROCEDURE — 71250 CT THORAX DX C-: CPT | Mod: TC

## 2019-04-23 PROCEDURE — 71250 CT THORAX DX C-: CPT | Mod: 26,,, | Performed by: RADIOLOGY

## 2019-04-23 PROCEDURE — 74176 CT ABD & PELVIS W/O CONTRAST: CPT | Mod: TC

## 2019-04-23 PROCEDURE — 71250 CT CHEST WITHOUT CONTRAST: ICD-10-PCS | Mod: 26,,, | Performed by: RADIOLOGY

## 2019-04-23 PROCEDURE — 74176 CT RENAL STONE STUDY ABD PELVIS WO: ICD-10-PCS | Mod: 26,,, | Performed by: RADIOLOGY

## 2019-04-25 ENCOUNTER — PATIENT MESSAGE (OUTPATIENT)
Dept: INTERNAL MEDICINE | Facility: CLINIC | Age: 69
End: 2019-04-25

## 2019-05-13 ENCOUNTER — TELEPHONE (OUTPATIENT)
Dept: INTERNAL MEDICINE | Facility: CLINIC | Age: 69
End: 2019-05-13

## 2019-05-13 ENCOUNTER — OFFICE VISIT (OUTPATIENT)
Dept: PSYCHIATRY | Facility: CLINIC | Age: 69
End: 2019-05-13
Payer: MEDICARE

## 2019-05-13 DIAGNOSIS — F33.41 MAJOR DEPRESSIVE DISORDER, RECURRENT EPISODE, IN PARTIAL REMISSION: Primary | ICD-10-CM

## 2019-05-13 DIAGNOSIS — F98.8 ATTENTION DEFICIT DISORDER (ADD) IN ADULT: ICD-10-CM

## 2019-05-13 PROCEDURE — 90834 PR PSYCHOTHERAPY W/PATIENT, 45 MIN: ICD-10-PCS | Mod: S$GLB,,, | Performed by: SOCIAL WORKER

## 2019-05-13 PROCEDURE — 90834 PSYTX W PT 45 MINUTES: CPT | Mod: S$GLB,,, | Performed by: SOCIAL WORKER

## 2019-05-13 NOTE — TELEPHONE ENCOUNTER
Left a message for the patient to call the office back.   To discuss fall      Please Advise  Thank You

## 2019-05-13 NOTE — TELEPHONE ENCOUNTER
----- Message from Jeannie Apodaca sent at 5/13/2019  2:01 PM CDT -----  Contact: self [phone]  Type: Returning a call    Who left a message? Hallie    When did the practice call? today    Comments: Please return her call

## 2019-05-13 NOTE — PROGRESS NOTES
"Individual Psychotherapy (PhD/LCSW)    5/13/2019    Site: [facility]    Therapeutic Intervention: Met with patient alone.  Outpatient - Insight oriented psychotherapy 45 min - CPT code 06654    Chief complaint/reason for encounter: depression, distractibility     Interval history and content of current session: Pt comes in bruised on L side of face, with stitches above L eye, had a fall while at conference in [place]. Pt states she felt "wobbly" before falling. She will see PCP tomorrow to determine cause of fall. Pt enjoyed conference, companionship and scholarship. She will attend another conference, in [place] In two weeks. Pt is working steadily, eating and sleeping ok, enjoying her cats. A goal is to better connect with [employer] community in [facility].    Treatment plan:  · Target symptoms: depression, distractibility  · Why chosen therapy is appropriate versus another modality: relevant to diagnosis  · Outcome monitoring methods: self-report  · Therapeutic intervention type: insight oriented psychotherapy    Risk parameters:  Patient reports no suicidal ideation  Patient reports no homicidal ideation  Patient reports no self-injurious behavior  Patient reports no violent behavior    Verbal deficits: None    Patient's response to intervention:  The patient's response to intervention is motivated    Progress toward goals and other mental status changes:  The patient's progress toward goals is progressing slowly    Diagnosis: Major depression, irecurrent, partial remission,  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

## 2019-05-13 NOTE — TELEPHONE ENCOUNTER
----- Message from Sofy Ray sent at 5/13/2019 12:55 PM CDT -----  Contact: self/[phone]  Patient called in regards needing to talk with Dr Blue medical assistant about a fall that patient had while she was out of town. Please call and advise. Thank you

## 2019-05-13 NOTE — TELEPHONE ENCOUNTER
Patient fell a week ago in [place]. Patient has stiches that need to come out, stitches above the left eyebrow. Scheduled patient to come in tomorrow 05/14/19 at 2:30pm

## 2019-05-14 ENCOUNTER — OFFICE VISIT (OUTPATIENT)
Dept: INTERNAL MEDICINE | Facility: CLINIC | Age: 69
End: 2019-05-14
Payer: MEDICARE

## 2019-05-14 VITALS
BODY MASS INDEX: 24.92 KG/M2 | WEIGHT: 158.75 LBS | DIASTOLIC BLOOD PRESSURE: 66 MMHG | OXYGEN SATURATION: 99 % | TEMPERATURE: 98 F | HEART RATE: 65 BPM | SYSTOLIC BLOOD PRESSURE: 112 MMHG | HEIGHT: 67 IN

## 2019-05-14 DIAGNOSIS — R55 SYNCOPE, UNSPECIFIED SYNCOPE TYPE: ICD-10-CM

## 2019-05-14 DIAGNOSIS — S63.259D DISLOCATION OF FINGER, SUBSEQUENT ENCOUNTER: ICD-10-CM

## 2019-05-14 DIAGNOSIS — R91.1 PULMONARY NODULE: ICD-10-CM

## 2019-05-14 DIAGNOSIS — E21.3 HYPERPARATHYROIDISM: Primary | ICD-10-CM

## 2019-05-14 PROCEDURE — 3074F SYST BP LT 130 MM HG: CPT | Mod: CPTII,S$GLB,, | Performed by: INTERNAL MEDICINE

## 2019-05-14 PROCEDURE — 93005 EKG 12-LEAD: ICD-10-PCS | Mod: S$GLB,,, | Performed by: INTERNAL MEDICINE

## 2019-05-14 PROCEDURE — 93010 ELECTROCARDIOGRAM REPORT: CPT | Mod: S$GLB,,, | Performed by: INTERNAL MEDICINE

## 2019-05-14 PROCEDURE — 99999 PR PBB SHADOW E&M-EST. PATIENT-LVL V: ICD-10-PCS | Mod: PBBFAC,,, | Performed by: INTERNAL MEDICINE

## 2019-05-14 PROCEDURE — 3074F PR MOST RECENT SYSTOLIC BLOOD PRESSURE < 130 MM HG: ICD-10-PCS | Mod: CPTII,S$GLB,, | Performed by: INTERNAL MEDICINE

## 2019-05-14 PROCEDURE — 3078F PR MOST RECENT DIASTOLIC BLOOD PRESSURE < 80 MM HG: ICD-10-PCS | Mod: CPTII,S$GLB,, | Performed by: INTERNAL MEDICINE

## 2019-05-14 PROCEDURE — 93005 ELECTROCARDIOGRAM TRACING: CPT | Mod: S$GLB,,, | Performed by: INTERNAL MEDICINE

## 2019-05-14 PROCEDURE — 3078F DIAST BP <80 MM HG: CPT | Mod: CPTII,S$GLB,, | Performed by: INTERNAL MEDICINE

## 2019-05-14 PROCEDURE — 1101F PT FALLS ASSESS-DOCD LE1/YR: CPT | Mod: CPTII,S$GLB,, | Performed by: INTERNAL MEDICINE

## 2019-05-14 PROCEDURE — 1101F PR PT FALLS ASSESS DOC 0-1 FALLS W/OUT INJ PAST YR: ICD-10-PCS | Mod: CPTII,S$GLB,, | Performed by: INTERNAL MEDICINE

## 2019-05-14 PROCEDURE — 99214 OFFICE O/P EST MOD 30 MIN: CPT | Mod: S$GLB,,, | Performed by: INTERNAL MEDICINE

## 2019-05-14 PROCEDURE — 99214 PR OFFICE/OUTPT VISIT, EST, LEVL IV, 30-39 MIN: ICD-10-PCS | Mod: S$GLB,,, | Performed by: INTERNAL MEDICINE

## 2019-05-14 PROCEDURE — 99999 PR PBB SHADOW E&M-EST. PATIENT-LVL V: CPT | Mod: PBBFAC,,, | Performed by: INTERNAL MEDICINE

## 2019-05-14 PROCEDURE — 93010 EKG 12-LEAD: ICD-10-PCS | Mod: S$GLB,,, | Performed by: INTERNAL MEDICINE

## 2019-05-15 ENCOUNTER — TELEPHONE (OUTPATIENT)
Dept: ENDOSCOPY | Facility: HOSPITAL | Age: 69
End: 2019-05-15

## 2019-05-15 DIAGNOSIS — R93.89 ABNORMAL FINDING ON IMAGING: Primary | ICD-10-CM

## 2019-05-15 NOTE — TELEPHONE ENCOUNTER
----- Message from Cy Deluna MD sent at 5/14/2019  7:41 PM CDT -----  Contact: Iris Blue MD  I think EUS.  Thanks!  Cy  ----- Message -----  From: Iris Blue MD  Sent: 5/14/2019   3:41 PM  To: Anna Bartlett MA, Cy Deluna MD    Hi,    This patient has weight loss and fatty pancreas with fat stranding - duct not dilated.  Will repeat CT with contrast pancreas protocol if you feel appropriate vs EUS/MRI to evaluate?  Thanks and have a great day!    Iris

## 2019-05-17 ENCOUNTER — TELEPHONE (OUTPATIENT)
Dept: INTERNAL MEDICINE | Facility: CLINIC | Age: 69
End: 2019-05-17

## 2019-05-17 NOTE — TELEPHONE ENCOUNTER
Called patient to scheduled appt's...no answer...L/M for a return call to scheduled appt for Pet Scan, Endo, CT/chest, and Orthro.

## 2019-05-21 ENCOUNTER — PATIENT MESSAGE (OUTPATIENT)
Dept: PSYCHIATRY | Facility: CLINIC | Age: 69
End: 2019-05-21

## 2019-05-22 ENCOUNTER — OFFICE VISIT (OUTPATIENT)
Dept: URGENT CARE | Facility: CLINIC | Age: 69
End: 2019-05-22
Payer: MEDICARE

## 2019-05-22 VITALS
SYSTOLIC BLOOD PRESSURE: 137 MMHG | BODY MASS INDEX: 25.39 KG/M2 | WEIGHT: 158 LBS | HEART RATE: 72 BPM | HEIGHT: 66 IN | RESPIRATION RATE: 18 BRPM | OXYGEN SATURATION: 100 % | TEMPERATURE: 98 F | DIASTOLIC BLOOD PRESSURE: 65 MMHG

## 2019-05-22 DIAGNOSIS — N75.0 BARTHOLIN CYST: Primary | ICD-10-CM

## 2019-05-22 PROCEDURE — 99214 OFFICE O/P EST MOD 30 MIN: CPT | Mod: 25,S$GLB,, | Performed by: NURSE PRACTITIONER

## 2019-05-22 PROCEDURE — 3078F PR MOST RECENT DIASTOLIC BLOOD PRESSURE < 80 MM HG: ICD-10-PCS | Mod: CPTII,S$GLB,, | Performed by: NURSE PRACTITIONER

## 2019-05-22 PROCEDURE — 3075F SYST BP GE 130 - 139MM HG: CPT | Mod: CPTII,S$GLB,, | Performed by: NURSE PRACTITIONER

## 2019-05-22 PROCEDURE — 1101F PT FALLS ASSESS-DOCD LE1/YR: CPT | Mod: CPTII,S$GLB,, | Performed by: NURSE PRACTITIONER

## 2019-05-22 PROCEDURE — 3078F DIAST BP <80 MM HG: CPT | Mod: CPTII,S$GLB,, | Performed by: NURSE PRACTITIONER

## 2019-05-22 PROCEDURE — 3075F PR MOST RECENT SYSTOLIC BLOOD PRESS GE 130-139MM HG: ICD-10-PCS | Mod: CPTII,S$GLB,, | Performed by: NURSE PRACTITIONER

## 2019-05-22 PROCEDURE — 56420 INCISION & DRAINAGE: ICD-10-PCS | Mod: S$GLB,,, | Performed by: NURSE PRACTITIONER

## 2019-05-22 PROCEDURE — 1101F PR PT FALLS ASSESS DOC 0-1 FALLS W/OUT INJ PAST YR: ICD-10-PCS | Mod: CPTII,S$GLB,, | Performed by: NURSE PRACTITIONER

## 2019-05-22 PROCEDURE — 99214 PR OFFICE/OUTPT VISIT, EST, LEVL IV, 30-39 MIN: ICD-10-PCS | Mod: 25,S$GLB,, | Performed by: NURSE PRACTITIONER

## 2019-05-22 PROCEDURE — 56420 I&D BARTHOLINS GLAND ABSCESS: CPT | Mod: S$GLB,,, | Performed by: NURSE PRACTITIONER

## 2019-05-22 RX ORDER — SULFAMETHOXAZOLE AND TRIMETHOPRIM 800; 160 MG/1; MG/1
1 TABLET ORAL 2 TIMES DAILY
Qty: 20 TABLET | Refills: 0 | Status: SHIPPED | OUTPATIENT
Start: 2019-05-22 | End: 2019-06-01

## 2019-05-22 NOTE — PROGRESS NOTES
Ms. Wynne is a patient of Dr. Whelan and was last seen in clinic 7/26/2018.      Subjective:   Patient ID:  Manisha Wynne is a 68 y.o. female who presents for follow-up of Pacemaker Check  .     HPI:    Ms. Wynne is a 68 y.o. female with SSS, PPM, HLD, HTN, and CEDRIC here for annual follow up.     Background:    9/7/2012 underwent dual chamber PPM placement for syncope and Intermittent high grade infrahisian block.  Symptoms resolved after device implantation.    Update (05/23/2019):    Today she reports that she recently (5/6/2019) experienced a fall while in [place] for a conference. Dislocated her finger, but head CT was negative. She was feeling fatigued and lost her balance as she stepped off the curb. She did not lose consciousness. Otherwise feels well. Ms. Wynne denies chest pain with exertion or at rest, palpitations, SOB, SHEPARD, dizziness, or syncope.    Device Interrogation (4/25/2019) reveals an intrinsic sinus rhythm with 1st degree AVB with stable lead and device function. ATx7 episodes, longest 8 seconds. She paces 86% in the RA and 0% in the RV. Estimated battery longevity 3 years.   Device checked today shows no arrhythmic episodes during period of her fall.    I have personally reviewed the patient's EKG today, which shows APVS with RBBB at 64bpm. ID interval is 216. QRS is 140. QTc is 443.    Recent Cardiac Tests:    2D Echo (1/25/2019):  · Mild-to-moderate aortic regurgitation.  · Mild mitral regurgitation.  · Mild tricuspid regurgitation.  · Normal central venous pressure (3 mm Hg).  · The estimated PA systolic pressure is 27 mm Hg  · Normal left ventricular systolic function. The estimated ejection fraction is 60%  · Indeterminate left ventricular diastolic function.  · Normal right ventricular systolic function.     Current Outpatient Medications   Medication Sig    aspirin (ECOTRIN) 81 MG EC tablet Take 81 mg by mouth. 1 Tablet, Delayed Release (E.C.) Oral Every day     buPROPion HCl 450 mg Tb24 Take 450 mg by mouth once daily.     calcium-vitamin D 600 mg(1,500mg) -200 unit Tab Take 1 tablet by mouth once daily. 2 Tablets Oral Every day    chlorhexidine (PERIDEX) 0.12 % solution Use as directed 15 mLs in the mouth or throat 2 (two) times daily.     cholecalciferol, vitamin D3, (VITAMIN D3) 5,000 unit Tab Take 5,000 Units by mouth once daily.    FLUoxetine (PROZAC) 20 MG capsule Take 20 mg by mouth once daily.    lisdexamfetamine (VYVANSE) 70 MG capsule Take 70 mg by mouth every morning.    losartan (COZAAR) 25 MG tablet TAKE 1 TABLET(25 MG) BY MOUTH EVERY DAY    metoprolol tartrate (LOPRESSOR) 25 MG tablet Take 1 tablet (25 mg total) by mouth 2 (two) times daily.    multivitamin-minerals-lutein (CENTRUM SILVER) Tab Take by mouth. 1 Tablet Oral Every day    pravastatin (PRAVACHOL) 10 MG tablet Take 1 tablet (10 mg total) by mouth once daily.    sulfamethoxazole-trimethoprim 800-160mg (BACTRIM DS) 800-160 mg Tab Take 1 tablet by mouth 2 (two) times daily. for 10 days    neomycin-polymyxin-hydrocortisone (CORTISPORIN) 3.5-10,000-1 mg/mL-unit/mL-% otic suspension Place 3 drops into both ears 3 (three) times daily.     No current facility-administered medications for this visit.        Review of Systems   Constitution: Negative for malaise/fatigue.   Cardiovascular: Negative for chest pain, dyspnea on exertion, irregular heartbeat, leg swelling and palpitations.   Respiratory: Negative for shortness of breath.    Hematologic/Lymphatic: Negative for bleeding problem.   Skin: Negative for rash.   Musculoskeletal: Positive for falls. Negative for myalgias.   Gastrointestinal: Negative for hematemesis, hematochezia and nausea.   Genitourinary: Negative for hematuria.   Neurological: Negative for light-headedness.   Psychiatric/Behavioral: Negative for altered mental status.   Allergic/Immunologic: Negative for persistent infections.     Objective:          /73   Pulse 64   " Ht 5' 6" (1.676 m)   Wt 72.9 kg (160 lb 11.5 oz)   LMP 10/01/2003   BMI 25.94 kg/m²     Physical Exam   Constitutional: She is oriented to person, place, and time. She appears well-developed and well-nourished.   HENT:   Head: Normocephalic.   Nose: Nose normal.   Eyes: Pupils are equal, round, and reactive to light.   Cardiovascular: Normal rate, regular rhythm, S1 normal and S2 normal.   No murmur heard.  Pulses:       Radial pulses are 2+ on the right side, and 2+ on the left side.   Pulmonary/Chest: Breath sounds normal. No respiratory distress.   Device to LUCW.   Abdominal: Normal appearance.   Musculoskeletal: Normal range of motion. She exhibits no edema.   Neurological: She is alert and oriented to person, place, and time.   Skin: Skin is warm and dry. No erythema.   Psychiatric: She has a normal mood and affect. Her speech is normal and behavior is normal.   Nursing note and vitals reviewed.        Lab Results   Component Value Date     04/23/2019    K 5.0 04/23/2019    MG 2.5 09/07/2012    BUN 23 04/23/2019    CREATININE 1.3 04/23/2019    ALT 28 04/23/2019    AST 28 04/23/2019    HGB 11.0 (L) 09/21/2018    HCT 33.1 (L) 09/21/2018    TSH 3.647 01/15/2019    LDLCALC 124.6 01/15/2019           Assessment:     1. Cardiac pacemaker in situ    2. SSS (sick sinus syndrome)    3. Essential hypertension    4. AV block, 1st degree    5. RBBB    6. CEDRIC (obstructive sleep apnea)      Plan:     In summary, Ms. Wynne is a 68 y.o. female with SSS, PPM, HLD, HTN, and CEDRIC here for annual follow up.   Ms. Wynne is doing well from a device perspective with stable lead and device function. No significant arrhythmia noted. No arrhythmia during the period of her fall, which she thinks could have been related to some temporary dehydration/hypotension.  No RV pacing. No CHF symptoms. She is doing well.    Continue current medication regimen and device settings.   Follow up in device clinic as scheduled. "   Follow up in EP clinic in 1 year, sooner as needed.     Patient will follow up with Dr. Sheets at [facility] (if she doesn't decide to go to ).    *A copy of this note has been sent to Dr. Whelan*    Follow up in about 1 year (around 5/23/2020).    ------------------------------------------------------------------    AL Lanza, NP-C  Cardiac Electrophysiology

## 2019-05-22 NOTE — PATIENT INSTRUCTIONS
Please drink plenty of fluids.  Please get plenty of rest.  Please return here or go to the Emergency Department for any concerns or worsening of condition.  If you were prescribed antibiotics, please take them to completion.  If you were prescribed a narcotic medication, do not drive or operate heavy equipment or machinery while taking these medications.  Please return here in 1-3 days for a recheck of your wound.  If not allergic, please take over the counter Tylenol (Acetaminophen) and/or Motrin (Ibuprofen) as directed for control of pain and/or fever.  Please follow up with your primary care doctor or specialist as needed.    If you  smoke, please stop smoking.    Bartholins Cyst: Common Treatments    The Bartholins glands are a pair of very small glands found inside the labia (vaginal lips). (There is one on either side.) The glands produce fluid to help keep the vagina moist. When the opening of a Bartholins gland becomes blocked, the gland may swell and form a cyst. The cyst may vary in size from small to large (1 to 3 cm in size). It may feel like a firm lump within the labia.  Treatment depends on various factors. These include the size of the cyst, whether it causes symptoms, and whether its infected. Small and/or uninfected cysts dont usually need treatment. Large cysts and those that are painful or infected will likely need treatment. Below are some common treatments that may be done:  · Incision and drainage: With this procedure, the area over the cyst is numbed. The cyst is cut and drained. Often, a thin tube with a balloon on the end (called a Word catheter) is then inserted into the empty cyst. This allows for further drainage of fluid. The catheter is left in place for 4 to 6 weeks. It is then removed by the healthcare provider.  · Marsupialization: With this procedure, the area over the cyst is numbed. The cyst is cut and drained. The edges of the skin are then stitched to create a small  opening that will allow for further drainage of fluid.  Note: If you have recurrent cysts, other treatments may be needed. Your provider can tell you more about these options.  If your cyst is infected, antibiotics will likely be prescribed. Most infections of Bartholins cysts are due to bacteria that are normally present in the vagina. Sometimes, the bacteria may have been passed to you during sex. This is called a sexually transmitted disease (STD). A test (culture) of the fluid can confirm if you have an STD.  Home care  Medicines  · If antibiotics are prescribed, be sure to take them exactly as directed. Dont stop taking the medicine, even if you start to feel better. Note: If the cause of your infection is an STD, any sexual partners you have will also need to be tested and treated.  · If you have pain, use over-the-counter pain medicine as directed. If needed, stronger pain medicines can be prescribed.   General care  · To help relieve discomfort, you may be advised to take sitz baths for the next few days. For this, you soak in bath filled with 2 to 3 inches of warm water for 10 to 15 minutes, a few times a day.  · Avoid having sex until the cyst has healed. If the cause of your infection is an STD, also avoid having sex until you and any partners you have are done with treatment.  Follow-up care  Follow up with your healthcare provider as directed. Be sure to keep all appointments. These are needed to ensure that you are recovering well after your treatment. If you have a catheter, your provider will let you know when to return to have it removed.  When to seek medical advice  Call your healthcare provider right away if any of these occur:  · Fever does not go down or worsens  · Increased redness, pain, swelling, or foul-smelling drainage from the cyst or the area around it  · Pain in the lower abdomen   · New rash or joint pain  · Catheter falls out before the scheduled time to remove it (only if a Word  catheter was placed)  Date Last Reviewed: 6/11/2015  © 3235-1279 [employer]. [address] [place] [address], [place] [address]. All rights reserved. This information is not intended as a substitute for professional medical care. Always follow your healthcare professional's instructions.        Abscess (Incision & Drainage)  An abscess is sometimes called a boil. It happens when bacteria get trapped under the skin and start to grow. Pus forms inside the abscess as the body responds to the bacteria. An abscess can happen with an insect bite, ingrown hair, blocked oil gland, pimple, cyst, or puncture wound.  Your healthcare provider has drained the pus from your abscess. If the abscess pocket was large, your healthcare provider may have put in gauze packing. Your provider will need to remove it on your next visit. He or she may also replace it at that time. You may not need antibiotics to treat a simple abscess, unless the infection is spreading into the skin around the wound (cellulitis).  The wound will take about 1 to 2 weeks to heal, depending on the size of the abscess. Healthy tissue will grow from the bottom and sides of the opening until it seals over.  Home care  These tips can help your wound heal:  · The wound may drain for the first 2 days. Cover the wound with a clean dry dressing. Change the dressing if it becomes soaked with blood or pus.  · If a gauze packing was placed inside the abscess pocket, you may be told to remove it yourself. You may do this in the shower. Once the packing is removed, you should wash the area in the shower, or clean the area as directed by your provider. Continue to do this until the skin opening has closed. Make sure you wash your hands after changing the packing or cleaning the wound.  · If you were prescribed antibiotics, take them as directed until they are all gone.  · You may use acetaminophen or ibuprofen to control pain, unless another pain medicine was  prescribed. If you have liver disease or ever had a stomach ulcer, talk with your doctor before using these medicines.  Follow-up care  Follow up with your healthcare provider, or as advised. If a gauze packing was put in your wound, it should be removed in 1 to 2 days. Check your wound every day for any signs that the infection is getting worse. The signs are listed below.  When to seek medical advice  Call your healthcare provider right away if any of these occur:  · Increasing redness or swelling  · Red streaks in the skin leading away from the wound  · Increasing local pain or swelling  · Continued pus draining from the wound 2 days after treatment  · Fever of 100.4ºF (38ºC) or higher, or as directed by your healthcare provider  · Boil returns when you are at home  Date Last Reviewed: 9/1/2016  © 2234-8499 [employer]. [address] [place] [address], [place] [address]. All rights reserved. This information is not intended as a substitute for professional medical care. Always follow your healthcare professional's instructions.

## 2019-05-22 NOTE — PROCEDURES
"Incision & Drainage  Date/Time: 5/22/2019 5:35 PM  Performed by: Danyell Gómez NP  Authorized by: Danyell Gómez NP     Time out: Immediately prior to procedure a "time out" was called to verify the correct patient, procedure, equipment, support staff and site/side marked as required.    Consent Done?:  Yes (Verbal)    Type:  Abscess  Body area:  Anogenital  Location details:  Bartholin's gland  Anesthesia:  Local infiltration  Local anesthetic: lidocaine 1% without epinephrine  Anesthetic total (ml):  4  Scalpel size:  11  Incision type:  Single straight  Complexity:  Simple  Drainage:  Bloody and purulent  Drainage amount:  Copious  Wound treatment:  Incision, drainage, deloculation, expression of material and wound packed  Packing material:  1/2 in iodoform gauze  Patient tolerance:  Patient tolerated the procedure well with no immediate complications    Patient advised to return the clinic for follow-up in 2 days.  Voiced understanding.      "

## 2019-05-22 NOTE — PROGRESS NOTES
"Subjective:       Patient ID: Manisha Wynne is a 68 y.o. female.    Vitals:  height is 5' 6" (1.676 m) and weight is 71.7 kg (158 lb). Her temperature is 97.8 °F (36.6 °C). Her blood pressure is 137/65 and her pulse is 72. Her respiration is 18 and oxygen saturation is 100%.     Chief Complaint: Vaginal irritation    Patient has had a bump on the left side of her labia for a few years. She says that on Sunday she started to feel irritation in her vulva as she was sitting. She said she noticed the bump got larger when she looked at it on Monday     Vaginal Pain   This is a chronic problem. The current episode started more than 1 year ago. The problem occurs constantly. The problem has been rapidly worsening. The pain is moderate. The problem affects the left side. She is not pregnant. Pertinent negatives include no chills, diarrhea, dysuria, fever, frequency, headaches, nausea, rash, sore throat, urgency or vomiting. The symptoms are aggravated by tactile pressure and activity (sitting). She has tried warm baths for the symptoms. She is not sexually active.       Constitution: Negative for chills, fatigue and fever.   HENT: Negative for congestion and sore throat.    Neck: Negative for painful lymph nodes.   Cardiovascular: Negative for chest pain and leg swelling.   Eyes: Negative for double vision and blurred vision.   Respiratory: Negative for cough and shortness of breath.    Gastrointestinal: Negative for nausea, vomiting and diarrhea.   Genitourinary: Positive for vaginal pain and genital sore (left side). Negative for dysuria, frequency, urgency and history of kidney stones.   Musculoskeletal: Negative for joint pain, joint swelling, muscle cramps and muscle ache.   Skin: Positive for erythema. Negative for color change, pale, rash and bruising.   Allergic/Immunologic: Negative for seasonal allergies.   Neurological: Negative for dizziness, history of vertigo, light-headedness, passing out and " headaches.   Hematologic/Lymphatic: Negative for swollen lymph nodes.   Psychiatric/Behavioral: Negative for nervous/anxious, sleep disturbance and depression. The patient is not nervous/anxious.        Objective:      Physical Exam   Constitutional: She is oriented to person, place, and time. She appears well-developed and well-nourished.   HENT:   Head: Normocephalic and atraumatic.   Right Ear: External ear normal.   Left Ear: External ear normal.   Nose: Nose normal. No nasal deformity. No epistaxis.   Mouth/Throat: Mucous membranes are normal.   Eyes: Conjunctivae and lids are normal.   Neck: Trachea normal, normal range of motion and phonation normal. Neck supple.   Cardiovascular: Normal rate and normal pulses.   Pulmonary/Chest: Effort normal.   Abdominal: Soft. Normal appearance and bowel sounds are normal. She exhibits no distension and no mass. There is no tenderness. There is no CVA tenderness.   Genitourinary:       There is tenderness on the left labia. No vaginal discharge found.   Neurological: She is alert and oriented to person, place, and time.   Skin: Skin is warm, dry and intact. Capillary refill takes less than 2 seconds. There is erythema.   Psychiatric: She has a normal mood and affect. Her speech is normal and behavior is normal. Cognition and memory are normal.   Nursing note and vitals reviewed.      Assessment:       1. Bartholin cyst        Plan:         Bartholin cyst  -     sulfamethoxazole-trimethoprim 800-160mg (BACTRIM DS) 800-160 mg Tab; Take 1 tablet by mouth 2 (two) times daily. for 10 days  Dispense: 20 tablet; Refill: 0  -     Incision & Drainage      Patient Instructions     Please drink plenty of fluids.  Please get plenty of rest.  Please return here or go to the Emergency Department for any concerns or worsening of condition.  If you were prescribed antibiotics, please take them to completion.  If you were prescribed a narcotic medication, do not drive or operate heavy  equipment or machinery while taking these medications.  Please return here in 1-3 days for a recheck of your wound.  If not allergic, please take over the counter Tylenol (Acetaminophen) and/or Motrin (Ibuprofen) as directed for control of pain and/or fever.  Please follow up with your primary care doctor or specialist as needed.    If you  smoke, please stop smoking.    Bartholins Cyst: Common Treatments    The Bartholins glands are a pair of very small glands found inside the labia (vaginal lips). (There is one on either side.) The glands produce fluid to help keep the vagina moist. When the opening of a Bartholins gland becomes blocked, the gland may swell and form a cyst. The cyst may vary in size from small to large (1 to 3 cm in size). It may feel like a firm lump within the labia.  Treatment depends on various factors. These include the size of the cyst, whether it causes symptoms, and whether its infected. Small and/or uninfected cysts dont usually need treatment. Large cysts and those that are painful or infected will likely need treatment. Below are some common treatments that may be done:  · Incision and drainage: With this procedure, the area over the cyst is numbed. The cyst is cut and drained. Often, a thin tube with a balloon on the end (called a Word catheter) is then inserted into the empty cyst. This allows for further drainage of fluid. The catheter is left in place for 4 to 6 weeks. It is then removed by the healthcare provider.  · Marsupialization: With this procedure, the area over the cyst is numbed. The cyst is cut and drained. The edges of the skin are then stitched to create a small opening that will allow for further drainage of fluid.  Note: If you have recurrent cysts, other treatments may be needed. Your provider can tell you more about these options.  If your cyst is infected, antibiotics will likely be prescribed. Most infections of Bartholins cysts are due to bacteria that are  normally present in the vagina. Sometimes, the bacteria may have been passed to you during sex. This is called a sexually transmitted disease (STD). A test (culture) of the fluid can confirm if you have an STD.  Home care  Medicines  · If antibiotics are prescribed, be sure to take them exactly as directed. Dont stop taking the medicine, even if you start to feel better. Note: If the cause of your infection is an STD, any sexual partners you have will also need to be tested and treated.  · If you have pain, use over-the-counter pain medicine as directed. If needed, stronger pain medicines can be prescribed.   General care  · To help relieve discomfort, you may be advised to take sitz baths for the next few days. For this, you soak in bath filled with 2 to 3 inches of warm water for 10 to 15 minutes, a few times a day.  · Avoid having sex until the cyst has healed. If the cause of your infection is an STD, also avoid having sex until you and any partners you have are done with treatment.  Follow-up care  Follow up with your healthcare provider as directed. Be sure to keep all appointments. These are needed to ensure that you are recovering well after your treatment. If you have a catheter, your provider will let you know when to return to have it removed.  When to seek medical advice  Call your healthcare provider right away if any of these occur:  · Fever does not go down or worsens  · Increased redness, pain, swelling, or foul-smelling drainage from the cyst or the area around it  · Pain in the lower abdomen   · New rash or joint pain  · Catheter falls out before the scheduled time to remove it (only if a Word catheter was placed)  Date Last Reviewed: 6/11/2015  © 1399-3821 [employer]. [address] [place] [address], [place] [address]. All rights reserved. This information is not intended as a substitute for professional medical care. Always follow your healthcare professional's instructions.        Abscess  (Incision & Drainage)  An abscess is sometimes called a boil. It happens when bacteria get trapped under the skin and start to grow. Pus forms inside the abscess as the body responds to the bacteria. An abscess can happen with an insect bite, ingrown hair, blocked oil gland, pimple, cyst, or puncture wound.  Your healthcare provider has drained the pus from your abscess. If the abscess pocket was large, your healthcare provider may have put in gauze packing. Your provider will need to remove it on your next visit. He or she may also replace it at that time. You may not need antibiotics to treat a simple abscess, unless the infection is spreading into the skin around the wound (cellulitis).  The wound will take about 1 to 2 weeks to heal, depending on the size of the abscess. Healthy tissue will grow from the bottom and sides of the opening until it seals over.  Home care  These tips can help your wound heal:  · The wound may drain for the first 2 days. Cover the wound with a clean dry dressing. Change the dressing if it becomes soaked with blood or pus.  · If a gauze packing was placed inside the abscess pocket, you may be told to remove it yourself. You may do this in the shower. Once the packing is removed, you should wash the area in the shower, or clean the area as directed by your provider. Continue to do this until the skin opening has closed. Make sure you wash your hands after changing the packing or cleaning the wound.  · If you were prescribed antibiotics, take them as directed until they are all gone.  · You may use acetaminophen or ibuprofen to control pain, unless another pain medicine was prescribed. If you have liver disease or ever had a stomach ulcer, talk with your doctor before using these medicines.  Follow-up care  Follow up with your healthcare provider, or as advised. If a gauze packing was put in your wound, it should be removed in 1 to 2 days. Check your wound every day for any signs that  the infection is getting worse. The signs are listed below.  When to seek medical advice  Call your healthcare provider right away if any of these occur:  · Increasing redness or swelling  · Red streaks in the skin leading away from the wound  · Increasing local pain or swelling  · Continued pus draining from the wound 2 days after treatment  · Fever of 100.4ºF (38ºC) or higher, or as directed by your healthcare provider  · Boil returns when you are at home  Date Last Reviewed: 9/1/2016  © 2231-9542 [employer]. [address] [place] [address]. All rights reserved. This information is not intended as a substitute for professional medical care. Always follow your healthcare professional's instructions.

## 2019-05-23 ENCOUNTER — TELEPHONE (OUTPATIENT)
Dept: ORTHOPEDICS | Facility: CLINIC | Age: 69
End: 2019-05-23

## 2019-05-23 ENCOUNTER — OFFICE VISIT (OUTPATIENT)
Dept: ELECTROPHYSIOLOGY | Facility: CLINIC | Age: 69
End: 2019-05-23
Payer: MEDICARE

## 2019-05-23 ENCOUNTER — HOSPITAL ENCOUNTER (OUTPATIENT)
Dept: CARDIOLOGY | Facility: CLINIC | Age: 69
Discharge: HOME | End: 2019-05-23
Payer: MEDICARE

## 2019-05-23 ENCOUNTER — HOSPITAL ENCOUNTER (OUTPATIENT)
Dept: RADIOLOGY | Facility: HOSPITAL | Age: 69
Discharge: HOME | End: 2019-05-23
Attending: INTERNAL MEDICINE
Payer: MEDICARE

## 2019-05-23 VITALS
DIASTOLIC BLOOD PRESSURE: 73 MMHG | BODY MASS INDEX: 25.83 KG/M2 | HEART RATE: 64 BPM | HEIGHT: 66 IN | WEIGHT: 160.69 LBS | SYSTOLIC BLOOD PRESSURE: 128 MMHG

## 2019-05-23 DIAGNOSIS — I49.5 SSS (SICK SINUS SYNDROME): ICD-10-CM

## 2019-05-23 DIAGNOSIS — G47.33 OSA (OBSTRUCTIVE SLEEP APNEA): ICD-10-CM

## 2019-05-23 DIAGNOSIS — I10 ESSENTIAL HYPERTENSION: Chronic | ICD-10-CM

## 2019-05-23 DIAGNOSIS — R91.1 PULMONARY NODULE: ICD-10-CM

## 2019-05-23 DIAGNOSIS — Z95.0 CARDIAC PACEMAKER IN SITU: Primary | ICD-10-CM

## 2019-05-23 DIAGNOSIS — I45.10 RBBB: ICD-10-CM

## 2019-05-23 DIAGNOSIS — I49.5 SSS (SICK SINUS SYNDROME): Chronic | ICD-10-CM

## 2019-05-23 DIAGNOSIS — M79.644 FINGER PAIN, RIGHT: Primary | ICD-10-CM

## 2019-05-23 DIAGNOSIS — I44.0 AV BLOCK, 1ST DEGREE: Chronic | ICD-10-CM

## 2019-05-23 PROCEDURE — 1101F PT FALLS ASSESS-DOCD LE1/YR: CPT | Mod: CPTII,S$GLB,, | Performed by: NURSE PRACTITIONER

## 2019-05-23 PROCEDURE — 93005 ELECTROCARDIOGRAM TRACING: CPT | Mod: S$GLB,,, | Performed by: INTERNAL MEDICINE

## 2019-05-23 PROCEDURE — 93010 RHYTHM STRIP: ICD-10-PCS | Mod: S$GLB,,, | Performed by: INTERNAL MEDICINE

## 2019-05-23 PROCEDURE — 99214 OFFICE O/P EST MOD 30 MIN: CPT | Mod: S$GLB,,, | Performed by: NURSE PRACTITIONER

## 2019-05-23 PROCEDURE — 93005 RHYTHM STRIP: ICD-10-PCS | Mod: S$GLB,,, | Performed by: INTERNAL MEDICINE

## 2019-05-23 PROCEDURE — 3074F PR MOST RECENT SYSTOLIC BLOOD PRESSURE < 130 MM HG: ICD-10-PCS | Mod: CPTII,S$GLB,, | Performed by: NURSE PRACTITIONER

## 2019-05-23 PROCEDURE — 99999 PR PBB SHADOW E&M-EST. PATIENT-LVL III: ICD-10-PCS | Mod: PBBFAC,,, | Performed by: NURSE PRACTITIONER

## 2019-05-23 PROCEDURE — 99214 PR OFFICE/OUTPT VISIT, EST, LEVL IV, 30-39 MIN: ICD-10-PCS | Mod: S$GLB,,, | Performed by: NURSE PRACTITIONER

## 2019-05-23 PROCEDURE — 1101F PR PT FALLS ASSESS DOC 0-1 FALLS W/OUT INJ PAST YR: ICD-10-PCS | Mod: CPTII,S$GLB,, | Performed by: NURSE PRACTITIONER

## 2019-05-23 PROCEDURE — 3078F PR MOST RECENT DIASTOLIC BLOOD PRESSURE < 80 MM HG: ICD-10-PCS | Mod: CPTII,S$GLB,, | Performed by: NURSE PRACTITIONER

## 2019-05-23 PROCEDURE — 99999 PR PBB SHADOW E&M-EST. PATIENT-LVL III: CPT | Mod: PBBFAC,,, | Performed by: NURSE PRACTITIONER

## 2019-05-23 PROCEDURE — 93010 ELECTROCARDIOGRAM REPORT: CPT | Mod: S$GLB,,, | Performed by: INTERNAL MEDICINE

## 2019-05-23 PROCEDURE — 3078F DIAST BP <80 MM HG: CPT | Mod: CPTII,S$GLB,, | Performed by: NURSE PRACTITIONER

## 2019-05-23 PROCEDURE — 3074F SYST BP LT 130 MM HG: CPT | Mod: CPTII,S$GLB,, | Performed by: NURSE PRACTITIONER

## 2019-05-23 NOTE — TELEPHONE ENCOUNTER
Lm on vm advising pt to call office back regarding appointment on tomorrow.  Pt needs to inform us which hand is dislocated because she will need an xray before appointment on tomorrow.  Pt was advised she will have to arrive 1hr early (730am).

## 2019-05-23 NOTE — TELEPHONE ENCOUNTER
----- Message from Tamanna Hernandez sent at 5/23/2019  4:06 PM CDT -----  Contact: Self  Name of Who is Calling: JAYRO RUBY [[id number]]      What is the request in detail: Pt states that her rt pinky finger is dislocated, she has popped it back in, however; it is still swollen. Pt also states that her records from the ER (5/6/19)  is in her chart Please contact to further discuss and advise.      Can the clinic reply by [facility]: N      What [facility] to [facility] Back [facility]: [phone]

## 2019-05-24 ENCOUNTER — DOCUMENTATION ONLY (OUTPATIENT)
Dept: ORTHOPEDICS | Facility: CLINIC | Age: 69
End: 2019-05-24

## 2019-05-24 ENCOUNTER — TELEPHONE (OUTPATIENT)
Dept: ORTHOPEDICS | Facility: CLINIC | Age: 69
End: 2019-05-24

## 2019-05-24 ENCOUNTER — CLINICAL SUPPORT (OUTPATIENT)
Dept: URGENT CARE | Facility: CLINIC | Age: 69
End: 2019-05-24
Payer: MEDICARE

## 2019-05-24 VITALS
SYSTOLIC BLOOD PRESSURE: 130 MMHG | RESPIRATION RATE: 18 BRPM | DIASTOLIC BLOOD PRESSURE: 64 MMHG | WEIGHT: 160 LBS | BODY MASS INDEX: 25.71 KG/M2 | HEART RATE: 62 BPM | OXYGEN SATURATION: 99 % | HEIGHT: 66 IN | TEMPERATURE: 98 F

## 2019-05-24 DIAGNOSIS — N75.0 BARTHOLIN CYST: Primary | ICD-10-CM

## 2019-05-24 PROCEDURE — 99499 NO LOS: ICD-10-PCS | Mod: S$GLB,,, | Performed by: NURSE PRACTITIONER

## 2019-05-24 PROCEDURE — 99499 UNLISTED E&M SERVICE: CPT | Mod: S$GLB,,, | Performed by: NURSE PRACTITIONER

## 2019-05-24 NOTE — TELEPHONE ENCOUNTER
----- Message from Ewa Foreman sent at 5/24/2019  8:11 AM CDT -----  Contact: pt    Name of Who is Calling:JAYRO RUBY [[id number]]    What is the request in detail: Patient would like to reschedule appointment to Monday , patient state she can not make her appointment today. Tried to reschedule first available 06/04 Please contact to further discuss and advise    Can the clinic reply by [facility]: Yes    What Number to Call Back if not [facility]: [phone]

## 2019-05-24 NOTE — PROGRESS NOTES
"Subjective:       Patient ID: Manisha Wynne is a 68 y.o. female.    Vitals:  height is 5' 6" (1.676 m) and weight is 72.6 kg (160 lb). Her temperature is 98.2 °F (36.8 °C). Her blood pressure is 130/64 and her pulse is 62. Her respiration is 18 and oxygen saturation is 99%.     Chief Complaint: Abscess    Patient is here for an Abscess recheck. States it is doing much better.    Abscess   Chronicity:  NewProgression Since Onset: improving  Location:  Ano-genital  Associated Symptoms: no fever, no chills  Treatments Tried:  Draining/squeezing and oral antibiotics  Relieved by:  Draining/squeezing and oral antibiotics      Constitution: Negative for chills and fever.   HENT: Negative for facial swelling and sore throat.    Neck: Negative for painful lymph nodes.   Eyes: Negative for eye itching and eyelid swelling.   Respiratory: Negative for cough.    Musculoskeletal: Negative for joint pain and joint swelling.   Skin: Positive for abscess. Negative for color change, pale, wound, abrasion, laceration, lesion, skin thickening/induration, puncture wound, erythema, bruising, avulsion and hives.   Allergic/Immunologic: Negative for environmental allergies, immunocompromised state and hives.   Hematologic/Lymphatic: Negative for swollen lymph nodes.       Objective:      Physical Exam   Constitutional: She is oriented to person, place, and time. She appears well-developed and well-nourished.   HENT:   Head: Normocephalic and atraumatic.   Right Ear: External ear normal.   Left Ear: External ear normal.   Nose: Nose normal. No nasal deformity. No epistaxis.   Mouth/Throat: Mucous membranes are normal.   Eyes: Conjunctivae and lids are normal.   Neck: Trachea normal, normal range of motion and phonation normal. Neck supple.   Cardiovascular: Normal rate and normal pulses.   Pulmonary/Chest: Effort normal.   Abdominal: Soft. Normal appearance and bowel sounds are normal. She exhibits no distension and no mass. " There is no tenderness. There is no CVA tenderness.   Genitourinary:       There is no tenderness on the left labia. No vaginal discharge found.   Neurological: She is alert and oriented to person, place, and time.   Skin: Skin is warm, dry and intact. Capillary refill takes less than 2 seconds. No erythema.   Psychiatric: She has a normal mood and affect. Her speech is normal and behavior is normal. Cognition and memory are normal.   Nursing note and vitals reviewed.      Assessment:       1. Bartholin cyst        Plan:         Bartholin cyst

## 2019-05-25 ENCOUNTER — PATIENT MESSAGE (OUTPATIENT)
Dept: ENDOSCOPY | Facility: HOSPITAL | Age: 69
End: 2019-05-25

## 2019-05-27 ENCOUNTER — OFFICE VISIT (OUTPATIENT)
Dept: PSYCHIATRY | Facility: CLINIC | Age: 69
End: 2019-05-27
Payer: MEDICARE

## 2019-05-27 DIAGNOSIS — F33.41 MAJOR DEPRESSIVE DISORDER, RECURRENT EPISODE, IN PARTIAL REMISSION: Primary | ICD-10-CM

## 2019-05-27 DIAGNOSIS — F98.8 ATTENTION DEFICIT DISORDER (ADD) IN ADULT: ICD-10-CM

## 2019-05-27 PROCEDURE — 90834 PSYTX W PT 45 MINUTES: CPT | Mod: S$GLB,,, | Performed by: SOCIAL WORKER

## 2019-05-27 PROCEDURE — 90834 PR PSYCHOTHERAPY W/PATIENT, 45 MIN: ICD-10-PCS | Mod: S$GLB,,, | Performed by: SOCIAL WORKER

## 2019-05-27 NOTE — PROGRESS NOTES
Individual Psychotherapy (PhD/LCSW)    5/27/2019    Site: [facility]    Therapeutic Intervention: Met with patient alone.  Outpatient - Insight oriented psychotherapy 45 min - CPT code 16293    Chief complaint/reason for encounter: depression, distractibility     Interval history and content of current session: Pt feeling negative about herself, going to conference with peers and feels they have accomplished more with their lives. Help pt acknowledge and validate her work as a  and in bioethics. Discuss importance of aging wisely, ie not rushing, as pt has had a few falls, and positive self-talk to facilitate accomplishing her goals.    Treatment plan:  · Target symptoms: depression, distractibility  · Why chosen therapy is appropriate versus another modality: relevant to diagnosis  · Outcome monitoring methods: self-report  · Therapeutic intervention type: insight oriented psychotherapy    Risk parameters:  Patient reports no suicidal ideation  Patient reports no homicidal ideation  Patient reports no self-injurious behavior  Patient reports no violent behavior    Verbal deficits: None    Patient's response to intervention:  The patient's response to intervention is motivated    Progress toward goals and other mental status changes:  The patient's progress toward goals is progressing slowly    Diagnosis: Major depression, irecurrent, partial remission,  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

## 2019-05-31 NOTE — PROGRESS NOTES
Subjective:       Patient ID: Manisha Wynne is a 68 y.o. female.    Chief Complaint: Suture / Staple Removal (above left brow)    HPIPt had another fall walking across the street in [place] at a conference.  She may have lost consciousness for a short time - went by ambulance to ED. Has sutures to remove L forehead area - dislocated fifth finger L hand. She was having multiple falls when she needed pacer in the past. Discussed CT findings from work up for weight loss.  Review of Systems   Constitutional: Positive for unexpected weight change. Negative for activity change.   HENT: Negative for hearing loss, rhinorrhea and trouble swallowing.    Eyes: Positive for discharge. Negative for visual disturbance.   Respiratory: Negative for chest tightness, shortness of breath (PND or orthopnea) and wheezing.    Cardiovascular: Negative for chest pain and palpitations.   Gastrointestinal: Negative for abdominal pain, blood in stool, constipation, diarrhea, nausea and vomiting.   Endocrine: Negative for polydipsia and polyuria.   Genitourinary: Negative for difficulty urinating, dysuria, hematuria and menstrual problem.   Musculoskeletal: Negative for arthralgias, joint swelling and neck pain.   Neurological: Positive for weakness and headaches. Negative for seizures and syncope.   Psychiatric/Behavioral: Positive for dysphoric mood.       Objective:      Physical Exam   Constitutional: She is oriented to person, place, and time. She appears well-developed and well-nourished. No distress.   HENT:   Head: Normocephalic.   Mouth/Throat: Oropharynx is clear and moist.   Neck: Neck supple. No JVD present. No thyromegaly present.   Cardiovascular: Normal rate, regular rhythm, normal heart sounds and intact distal pulses. Exam reveals no gallop and no friction rub.   No murmur heard.  Pulmonary/Chest: Effort normal and breath sounds normal. She has no wheezes. She has no rales.   Abdominal: Soft. Bowel sounds are  normal. She exhibits no distension and no mass. There is no tenderness. There is no rebound and no guarding.   Musculoskeletal: She exhibits no edema.   Lymphadenopathy:     She has no cervical adenopathy.   Neurological: She is alert and oriented to person, place, and time. She has normal reflexes.   Skin: Skin is warm and dry.   Sutures removed without incident.   Psychiatric: She has a normal mood and affect. Her behavior is normal. Judgment and thought content normal.       Assessment:       1. Hyperparathyroidism    2. Pulmonary nodule    3. Syncope, unspecified syncope type    4. Dislocation of finger, subsequent encounter        Plan:   Hyperparathyroidism  -     NM Parathyroid Scan with SPECT Routine; Future; Expected date: 05/14/2019  -     Ambulatory consult to Endocrinology    Pulmonary nodule  -     CT Chest Without Contrast; Future; Expected date: 07/15/2019    Syncope, unspecified syncope type  -     EKG 12-lead  Needs to see EP - Dr. De Los Santos and make sure no arrhythmias caused the syncope  Dislocation of finger, subsequent encounter  -     Ambulatory consult to Orthopedics    Abnormal appearing pancreas on CT   Note sent to Dr. Deluna and Cassia regarding EUS

## 2019-06-07 ENCOUNTER — PATIENT MESSAGE (OUTPATIENT)
Dept: INTERNAL MEDICINE | Facility: CLINIC | Age: 69
End: 2019-06-07

## 2019-06-07 ENCOUNTER — OFFICE VISIT (OUTPATIENT)
Dept: URGENT CARE | Facility: CLINIC | Age: 69
End: 2019-06-07
Payer: MEDICARE

## 2019-06-07 VITALS
WEIGHT: 160 LBS | DIASTOLIC BLOOD PRESSURE: 60 MMHG | HEIGHT: 66 IN | RESPIRATION RATE: 20 BRPM | OXYGEN SATURATION: 100 % | HEART RATE: 78 BPM | BODY MASS INDEX: 25.71 KG/M2 | TEMPERATURE: 98 F | SYSTOLIC BLOOD PRESSURE: 115 MMHG

## 2019-06-07 DIAGNOSIS — Z48.02 VISIT FOR SUTURE REMOVAL: Primary | ICD-10-CM

## 2019-06-07 PROCEDURE — S0630 REMOVAL OF SUTURES: HCPCS | Mod: S$GLB,,, | Performed by: FAMILY MEDICINE

## 2019-06-07 PROCEDURE — S0630 PR REMOVAL OF SUTURES: ICD-10-PCS | Mod: S$GLB,,, | Performed by: FAMILY MEDICINE

## 2019-06-07 NOTE — PATIENT INSTRUCTIONS
"Please return or see your primary care doctor if you develop new or worsening symptoms.  (fever, redness, pus drainage, pain)    You must understand that you have received an Urgent Care treatment only and that you may be released before all of your medical problems are known or treated.    Suture or Staple Removal  You were seen today for a suture or staple removal. Your wound is healing as expected. The wound has healed well enough that the sutures or staples can be removed. The wound will continue to heal for the next few months.  At this time there is no sign of infection.   Home care  · If you have pain, take pain medicine as advised by your healthcare provider.   · Keep your wound clean and protected by covering it with a bandage for the next week or so.   · Wash your hands with soap and warm water before and after caring for your wound. This helps prevent infection.  · Clean the wound gently with soap and warm water daily or as directed by your childs health care provider. Do not use iodine, alcohol, or other cleansers on the wound.  Gently pat it dry. Put on a new bandage, if needed. Do not reuse bandages.  · If the area gets wet, gently pat it dry with a clean cloth. Replace the wet bandage with a dry one.  · Check the wound daily for signs of infection. (These are listed under "When to seek medical advice" below.)  · You may shower and bathe as usual. Swimming is now permitted.  Follow-up care  Follow up with your healthcare provider as advised.  When to seek medical advice   Call your healthcare provider if any of the following occur:  · Wound reopens or bleeds  · Signs of an infection, such as:  ¨ Increasing redness or swelling around the wound  ¨ Increased warmth from the wound  ¨ Worsening pain  ¨ Red streaking lines away from the wound  ¨ Fluid draining from the wound  · Fever of 100.4°F (38°C) or higher, or as directed by your child's healthcare provider  Date Last Reviewed: 9/27/2015  © 7966-9889 " [employer]. [address] [place] [address], [place] [address]. All rights reserved. This information is not intended as a substitute for professional medical care. Always follow your healthcare professional's instructions.

## 2019-06-07 NOTE — PROGRESS NOTES
"Subjective:       Patient ID: Manisha Wynne is a 68 y.o. female.    Vitals:  height is 5' 6" (1.676 m) and weight is 72.6 kg (160 lb). Her temperature is 98.1 °F (36.7 °C). Her blood pressure is 115/60 and her pulse is 78. Her respiration is 20 and oxygen saturation is 100%.     Chief Complaint: Suture / Staple Removal    A week ago pt went to the clinic for a fall in [place]. She tripped and fell on gravel. Lac to forehead and broken knee. No redness, fever, purulent drainage, worsening pain. Put neosporin on it for a few days then stopped.     Suture / Staple Removal   The sutures were placed 7 to 10 days ago. She tried regular soap and water washings since the wound repair. The treatment provided significant relief. Her temperature was unmeasured prior to arrival. There has been no drainage from the wound. There is no redness present. There is no swelling present. There is no pain present. She has no difficulty moving the affected extremity or digit.       Constitution: Negative for chills, fatigue and fever.   HENT: Negative for congestion and sore throat.    Neck: Negative for painful lymph nodes.   Cardiovascular: Negative for chest pain and leg swelling.   Eyes: Negative for double vision and blurred vision.   Respiratory: Negative for cough and shortness of breath.    Gastrointestinal: Negative for nausea, vomiting and diarrhea.   Genitourinary: Negative for dysuria, frequency, urgency and history of kidney stones.   Musculoskeletal: Negative for joint pain, joint swelling, muscle cramps and muscle ache.   Skin: Positive for laceration. Negative for color change, pale, rash, erythema and bruising.   Allergic/Immunologic: Negative for seasonal allergies.   Neurological: Negative for dizziness, history of vertigo, light-headedness, passing out and headaches.   Hematologic/Lymphatic: Negative for swollen lymph nodes.   Psychiatric/Behavioral: Negative for nervous/anxious, sleep disturbance and " depression. The patient is not nervous/anxious.          Objective:      Physical Exam   Constitutional: She is oriented to person, place, and time. She appears well-developed and well-nourished.   HENT:   Head: Normocephalic and atraumatic. Head is without abrasion, without contusion and without laceration.       Right Ear: External ear normal.   Left Ear: External ear normal.   Nose: Nose normal.   Mouth/Throat: Oropharynx is clear and moist.   Eyes: Pupils are equal, round, and reactive to light. Conjunctivae, EOM and lids are normal.   Neck: Trachea normal, full passive range of motion without pain and phonation normal. Neck supple.   Cardiovascular: Normal rate, regular rhythm and normal heart sounds.   Pulmonary/Chest: Effort normal and breath sounds normal. No stridor. No respiratory distress.   Musculoskeletal: Normal range of motion.   Neurological: She is alert and oriented to person, place, and time.   Skin: Skin is warm and dry. Capillary refill takes less than 2 seconds. Laceration noted. No abrasion, no bruising, no burn, no ecchymosis, no lesion and no rash noted. No erythema.   Psychiatric: She has a normal mood and affect. Her speech is normal and behavior is normal. Judgment and thought content normal. Cognition and memory are normal.   Nursing note and vitals reviewed.    Suture Removal  Date/Time: 6/7/2019 11:01 AM  Location procedure was performed: [facility]  Performed by: Joel Drake NP  Authorized by: Joel Drake NP   Body area: head/neck  Location details: forehead  Wound Appearance: clean, well healed, normal color, nontender and no drainage  Sutures Removed: 9  Complications: No  Specimens: No  Implants: No  Patient tolerance: Patient tolerated the procedure well with no immediate complications        Assessment:       1. Visit for suture removal        Plan:         Visit for suture removal  -     Suture Removal      Patient Instructions   Please  "return or see your primary care doctor if you develop new or worsening symptoms.  (fever, redness, pus drainage, pain)    You must understand that you have received an Urgent Care treatment only and that you may be released before all of your medical problems are known or treated.    Suture or Staple Removal  You were seen today for a suture or staple removal. Your wound is healing as expected. The wound has healed well enough that the sutures or staples can be removed. The wound will continue to heal for the next few months.  At this time there is no sign of infection.   Home care  · If you have pain, take pain medicine as advised by your healthcare provider.   · Keep your wound clean and protected by covering it with a bandage for the next week or so.   · Wash your hands with soap and warm water before and after caring for your wound. This helps prevent infection.  · Clean the wound gently with soap and warm water daily or as directed by your childs health care provider. Do not use iodine, alcohol, or other cleansers on the wound.  Gently pat it dry. Put on a new bandage, if needed. Do not reuse bandages.  · If the area gets wet, gently pat it dry with a clean cloth. Replace the wet bandage with a dry one.  · Check the wound daily for signs of infection. (These are listed under "When to seek medical advice" below.)  · You may shower and bathe as usual. Swimming is now permitted.  Follow-up care  Follow up with your healthcare provider as advised.  When to seek medical advice   Call your healthcare provider if any of the following occur:  · Wound reopens or bleeds  · Signs of an infection, such as:  ¨ Increasing redness or swelling around the wound  ¨ Increased warmth from the wound  ¨ Worsening pain  ¨ Red streaking lines away from the wound  ¨ Fluid draining from the wound  · Fever of 100.4°F (38°C) or higher, or as directed by your child's healthcare provider  Date Last Reviewed: 9/27/2015  © 7913-8232 The " [employer]. [address] [place] [address], [place] [address]. All rights reserved. This information is not intended as a substitute for professional medical care. Always follow your healthcare professional's instructions.

## 2019-06-10 ENCOUNTER — PATIENT MESSAGE (OUTPATIENT)
Dept: ORTHOPEDICS | Facility: CLINIC | Age: 69
End: 2019-06-10

## 2019-06-11 ENCOUNTER — PATIENT MESSAGE (OUTPATIENT)
Dept: PSYCHIATRY | Facility: CLINIC | Age: 69
End: 2019-06-11

## 2019-06-12 ENCOUNTER — TELEPHONE (OUTPATIENT)
Dept: INTERNAL MEDICINE | Facility: CLINIC | Age: 69
End: 2019-06-12

## 2019-06-12 ENCOUNTER — HOSPITAL ENCOUNTER (OUTPATIENT)
Dept: RADIOLOGY | Facility: OTHER | Age: 69
Discharge: HOME | End: 2019-06-12
Attending: PHYSICIAN ASSISTANT
Payer: MEDICARE

## 2019-06-12 ENCOUNTER — OFFICE VISIT (OUTPATIENT)
Dept: ORTHOPEDICS | Facility: CLINIC | Age: 69
End: 2019-06-12
Payer: MEDICARE

## 2019-06-12 VITALS
SYSTOLIC BLOOD PRESSURE: 126 MMHG | WEIGHT: 160 LBS | HEART RATE: 85 BPM | DIASTOLIC BLOOD PRESSURE: 76 MMHG | HEIGHT: 66 IN | BODY MASS INDEX: 25.71 KG/M2

## 2019-06-12 DIAGNOSIS — S63.259A DISLOCATION OF FINGER, INITIAL ENCOUNTER: Primary | ICD-10-CM

## 2019-06-12 DIAGNOSIS — M79.644 FINGER PAIN, RIGHT: ICD-10-CM

## 2019-06-12 PROCEDURE — 73140 X-RAY EXAM OF FINGER(S): CPT | Mod: 26,RT,, | Performed by: RADIOLOGY

## 2019-06-12 PROCEDURE — 73140 XR FINGER 2 OR MORE VIEWS RIGHT: ICD-10-PCS | Mod: 26,RT,, | Performed by: RADIOLOGY

## 2019-06-12 PROCEDURE — 99203 OFFICE O/P NEW LOW 30 MIN: CPT | Mod: 25,S$GLB,, | Performed by: PHYSICIAN ASSISTANT

## 2019-06-12 PROCEDURE — 3074F PR MOST RECENT SYSTOLIC BLOOD PRESSURE < 130 MM HG: ICD-10-PCS | Mod: CPTII,S$GLB,, | Performed by: PHYSICIAN ASSISTANT

## 2019-06-12 PROCEDURE — 99999 PR PBB SHADOW E&M-EST. PATIENT-LVL III: CPT | Mod: PBBFAC,,, | Performed by: PHYSICIAN ASSISTANT

## 2019-06-12 PROCEDURE — 76000 FLUOROSCOPY <1 HR PHYS/QHP: CPT | Mod: 26,S$GLB,, | Performed by: PHYSICIAN ASSISTANT

## 2019-06-12 PROCEDURE — 76000 PR  FLUOROSCOPE EXAMINATION: ICD-10-PCS | Mod: 26,S$GLB,, | Performed by: PHYSICIAN ASSISTANT

## 2019-06-12 PROCEDURE — 99203 PR OFFICE/OUTPT VISIT, NEW, LEVL III, 30-44 MIN: ICD-10-PCS | Mod: 25,S$GLB,, | Performed by: PHYSICIAN ASSISTANT

## 2019-06-12 PROCEDURE — 3078F PR MOST RECENT DIASTOLIC BLOOD PRESSURE < 80 MM HG: ICD-10-PCS | Mod: CPTII,S$GLB,, | Performed by: PHYSICIAN ASSISTANT

## 2019-06-12 PROCEDURE — 99999 PR PBB SHADOW E&M-EST. PATIENT-LVL III: ICD-10-PCS | Mod: PBBFAC,,, | Performed by: PHYSICIAN ASSISTANT

## 2019-06-12 PROCEDURE — 3074F SYST BP LT 130 MM HG: CPT | Mod: CPTII,S$GLB,, | Performed by: PHYSICIAN ASSISTANT

## 2019-06-12 PROCEDURE — 73140 X-RAY EXAM OF FINGER(S): CPT | Mod: TC,FY,RT

## 2019-06-12 PROCEDURE — 1101F PR PT FALLS ASSESS DOC 0-1 FALLS W/OUT INJ PAST YR: ICD-10-PCS | Mod: CPTII,S$GLB,, | Performed by: PHYSICIAN ASSISTANT

## 2019-06-12 PROCEDURE — 3078F DIAST BP <80 MM HG: CPT | Mod: CPTII,S$GLB,, | Performed by: PHYSICIAN ASSISTANT

## 2019-06-12 PROCEDURE — 1101F PT FALLS ASSESS-DOCD LE1/YR: CPT | Mod: CPTII,S$GLB,, | Performed by: PHYSICIAN ASSISTANT

## 2019-06-12 NOTE — TELEPHONE ENCOUNTER
Spoke with patient, scheduled her to see pcp on 07/09/19 for 1:30pm. She would like to speak with pcp to discuss the fall she had      Please Advise  Thank You

## 2019-06-12 NOTE — TELEPHONE ENCOUNTER
----- Message from Tonia Mcnally sent at 6/12/2019 12:59 PM CDT -----  Contact: Pt Portal Request  From: Manisha Wynne     Sent: 6/7/2019  5:28 PM CDT       To: Patient Appointment Schedule Request Mailing List  Subject: Appointment Request    Appointment Request From: Manisha Wynne    With Provider: Iris Blue MD [[facility]]    Preferred Date Range: 6/10/2019 - 6/30/2019    Preferred Times: Any time    Reason for visit: Existing Patient    Comments:  The reason I keep falling. Info in an email message sent to Dr. Blue today.

## 2019-06-12 NOTE — PROGRESS NOTES
Subjective:      Patient ID: Manisha Wynne is a 68 y.o. female.    Chief Complaint: Pain of the Right Hand      HPI  Manisha Wynne is a right hand dominant 68 y.o. female presenting today for right small finger pain. She had a fall on 5/6/19 where she reportedly dislocated the finger at the PIP joint. She was seen in the ED where the finger was reduced. She had another fall 5/31/19 in which she bruised small and ring fingers. Both falls occurred out of town. She has good motion of the fingers but does continue to have edema and pain. She initially tin taped the fingers but has not been doing so recently. She does have an upcoming appt with her PCP to discuss frequent falls.          Review of patient's allergies indicates:  No Known Allergies      Current Outpatient Medications   Medication Sig Dispense Refill    aspirin (ECOTRIN) 81 MG EC tablet Take 81 mg by mouth. 1 Tablet, Delayed Release (E.C.) Oral Every day      buPROPion HCl 450 mg Tb24 Take 450 mg by mouth once daily.       calcium-vitamin D 600 mg(1,500mg) -200 unit Tab Take 1 tablet by mouth once daily. 2 Tablets Oral Every day      chlorhexidine (PERIDEX) 0.12 % solution Use as directed 15 mLs in the mouth or throat 2 (two) times daily.       cholecalciferol, vitamin D3, (VITAMIN D3) 5,000 unit Tab Take 5,000 Units by mouth once daily.      FLUoxetine (PROZAC) 20 MG capsule Take 20 mg by mouth once daily.      lisdexamfetamine (VYVANSE) 70 MG capsule Take 70 mg by mouth every morning.      losartan (COZAAR) 25 MG tablet TAKE 1 TABLET(25 MG) BY MOUTH EVERY DAY 90 tablet 3    metoprolol tartrate (LOPRESSOR) 25 MG tablet Take 1 tablet (25 mg total) by mouth 2 (two) times daily. 180 tablet 3    multivitamin-minerals-lutein (CENTRUM SILVER) Tab Take by mouth. 1 Tablet Oral Every day      neomycin-polymyxin-hydrocortisone (CORTISPORIN) 3.5-10,000-1 mg/mL-unit/mL-% otic suspension Place 3 drops into both ears 3 (three) times  "daily. 10 mL 2    pravastatin (PRAVACHOL) 10 MG tablet Take 1 tablet (10 mg total) by mouth once daily. 90 tablet 3     No current facility-administered medications for this visit.        Past Medical History:   Diagnosis Date    Abnormal Pap smear     Atrial fibrillation     AV block, 1st degree 7/25/2012    Cataract     Depression 7/24/2012    Facet arthritis of lumbar region 3/31/2015    Hyperlipidemia     Hypertension 7/24/2012    Other specified cardiac dysrhythmias(427.89)     Syncope 7/24/2012       Past Surgical History:   Procedure Laterality Date    CARDIAC PACEMAKER PLACEMENT  9/12    COLONOSCOPY N/A 8/7/2015    Performed [facility]ivas [facility] at [facility]O (4TH FLR)    DILATION AND CURETTAGE OF UTERUS      Hx of precancerous cells?    INJECTION-STEROID-EPIDURAL-TRANSFORAMINAL Left 6/25/2015    Performed by Rafita [facility] at [facility] PAIN MGT    TONSILLECTOMY         Review of Systems:  Constitutional: Negative for chills and fever.   Respiratory: Negative for cough and shortness of breath.    Gastrointestinal: Negative for nausea and vomiting.   Skin: Negative for rash.   Neurological: Negative for dizziness and headaches.   Psychiatric/Behavioral: Negative for depression.   MSK as in HPI       OBJECTIVE:     PHYSICAL EXAM:  /76   Pulse 85   Ht 5' 6" (1.676 m)   Wt 72.6 kg (160 lb)   LMP 10/01/2003   BMI 25.82 kg/m²     GEN:  NAD, well-developed, well-groomed.  NEURO: Awake, alert, and oriented. Normal attention and concentration.    PSYCH: Normal mood and affect. Behavior is normal.  HEENT: No cervical lymphadenopathy noted.  CARDIOVASCULAR: Radial pulses 2+ bilaterally. No LE edema noted.  PULMONARY: Breath sounds normal. No respiratory distress.  SKIN: Intact, no rashes.      MSK:   RUE:  Good active ROM of the wrist and fingers. There is edema of the small finger. She has ttp over the small PIP. There is ecchymosis present near the web space between the 4th and 5th digits. no " significant ttp of the ring finger. AIN/PIN/Radial/Median/Ulnar Nerves assessed in isolation without deficit. Radial & Ulnar arteries palpated 2+. Capillary Refill <3s.      RADIOGRAPHS:  Xray right small finger 6/12/19  Impression     1. Allowing for degenerative change, no convincing acute displaced fracture or dislocation of the 5th digit, correlation with any tenderness about the DIP joint recommended as the distal aspect of the middle phalange is less well evaluated.   Comments: I have personally reviewed the imaging and I agree with the above radiologist's report.    Hand was also evaluated under fluoroscopy today to include ring finger and metacarpal. There is no evidence of fracture or dislocation.     ASSESSMENT/PLAN:       ICD-10-CM ICD-9-CM   1. Dislocation of finger, initial encounter S63.259A 834.00       Orders Placed This Encounter    Ambulatory Referral to Physical/Occupational Therapy     Plan:   -OT ordered  -tin tape  -RTC 4 weeks if needed        The patient indicates understanding of these issues and agrees to the plan.    Sarah Stapleton PA-C  Hand Clinic   [facility] [place]

## 2019-06-13 ENCOUNTER — PATIENT MESSAGE (OUTPATIENT)
Dept: ENDOSCOPY | Facility: HOSPITAL | Age: 69
End: 2019-06-13

## 2019-06-14 ENCOUNTER — HOSPITAL ENCOUNTER (OUTPATIENT)
Dept: RADIOLOGY | Facility: HOSPITAL | Age: 69
Discharge: HOME | End: 2019-06-14
Attending: INTERNAL MEDICINE
Payer: MEDICARE

## 2019-06-14 DIAGNOSIS — E21.3 HYPERPARATHYROIDISM: ICD-10-CM

## 2019-06-14 PROCEDURE — A9500 TC99M SESTAMIBI: HCPCS

## 2019-06-14 PROCEDURE — 78071 NM PARATHYROID SCAN WITH SPECT ROUTINE: ICD-10-PCS | Mod: 26,GC,, | Performed by: RADIOLOGY

## 2019-06-14 PROCEDURE — 78071 PARATHYRD PLANAR W/WO SUBTRJ: CPT | Mod: 26,GC,, | Performed by: RADIOLOGY

## 2019-06-17 ENCOUNTER — OFFICE VISIT (OUTPATIENT)
Dept: OPTOMETRY | Facility: CLINIC | Age: 69
End: 2019-06-17
Payer: COMMERCIAL

## 2019-06-17 ENCOUNTER — OFFICE VISIT (OUTPATIENT)
Dept: OBSTETRICS AND GYNECOLOGY | Facility: CLINIC | Age: 69
End: 2019-06-17
Payer: MEDICARE

## 2019-06-17 VITALS
WEIGHT: 160.06 LBS | SYSTOLIC BLOOD PRESSURE: 116 MMHG | HEIGHT: 66 IN | BODY MASS INDEX: 25.72 KG/M2 | DIASTOLIC BLOOD PRESSURE: 68 MMHG

## 2019-06-17 DIAGNOSIS — Z91.81 HISTORY OF FALL: ICD-10-CM

## 2019-06-17 DIAGNOSIS — N90.7 LABIAL CYST: Primary | ICD-10-CM

## 2019-06-17 DIAGNOSIS — H52.4 PRESBYOPIA: ICD-10-CM

## 2019-06-17 DIAGNOSIS — Z01.00 EXAMINATION OF EYES AND VISION: Primary | ICD-10-CM

## 2019-06-17 DIAGNOSIS — H25.13 NUCLEAR SCLEROSIS, BILATERAL: ICD-10-CM

## 2019-06-17 PROCEDURE — 1101F PT FALLS ASSESS-DOCD LE1/YR: CPT | Mod: CPTII,S$GLB,, | Performed by: OBSTETRICS & GYNECOLOGY

## 2019-06-17 PROCEDURE — 3078F DIAST BP <80 MM HG: CPT | Mod: CPTII,S$GLB,, | Performed by: OBSTETRICS & GYNECOLOGY

## 2019-06-17 PROCEDURE — 99999 PR PBB SHADOW E&M-EST. PATIENT-LVL II: CPT | Mod: PBBFAC,,, | Performed by: OPTOMETRIST

## 2019-06-17 PROCEDURE — 99999 PR PBB SHADOW E&M-EST. PATIENT-LVL II: ICD-10-PCS | Mod: PBBFAC,,, | Performed by: OPTOMETRIST

## 2019-06-17 PROCEDURE — 99214 PR OFFICE/OUTPT VISIT, EST, LEVL IV, 30-39 MIN: ICD-10-PCS | Mod: S$GLB,,, | Performed by: OBSTETRICS & GYNECOLOGY

## 2019-06-17 PROCEDURE — 92015 PR REFRACTION: ICD-10-PCS | Mod: S$GLB,,, | Performed by: OPTOMETRIST

## 2019-06-17 PROCEDURE — 92015 DETERMINE REFRACTIVE STATE: CPT | Mod: S$GLB,,, | Performed by: OPTOMETRIST

## 2019-06-17 PROCEDURE — 99214 OFFICE O/P EST MOD 30 MIN: CPT | Mod: S$GLB,,, | Performed by: OBSTETRICS & GYNECOLOGY

## 2019-06-17 PROCEDURE — 92014 PR EYE EXAM, EST PATIENT,COMPREHESV: ICD-10-PCS | Mod: S$GLB,,, | Performed by: OPTOMETRIST

## 2019-06-17 PROCEDURE — 1101F PR PT FALLS ASSESS DOC 0-1 FALLS W/OUT INJ PAST YR: ICD-10-PCS | Mod: CPTII,S$GLB,, | Performed by: OBSTETRICS & GYNECOLOGY

## 2019-06-17 PROCEDURE — 3074F PR MOST RECENT SYSTOLIC BLOOD PRESSURE < 130 MM HG: ICD-10-PCS | Mod: CPTII,S$GLB,, | Performed by: OBSTETRICS & GYNECOLOGY

## 2019-06-17 PROCEDURE — 3078F PR MOST RECENT DIASTOLIC BLOOD PRESSURE < 80 MM HG: ICD-10-PCS | Mod: CPTII,S$GLB,, | Performed by: OBSTETRICS & GYNECOLOGY

## 2019-06-17 PROCEDURE — 92014 COMPRE OPH EXAM EST PT 1/>: CPT | Mod: S$GLB,,, | Performed by: OPTOMETRIST

## 2019-06-17 PROCEDURE — 3074F SYST BP LT 130 MM HG: CPT | Mod: CPTII,S$GLB,, | Performed by: OBSTETRICS & GYNECOLOGY

## 2019-06-17 NOTE — PROGRESS NOTES
CC: 67 yo here for fu visit     HPI: 68 here for fu labial cyst visit. Seen at urgent care last week for suspected bartholins gland cyst on the left. Cyst was incised and drained. She was told to have close follow up with her ObGyn. She is worried that the cyst is coming back. Unfortunately, she had fall at work and fractured her right patella, so she has been having difficulty with mobility. No fevers, chills. She was given antibiotics at her initial visit for evaluation of this labial cyst.     Past Medical History:   Diagnosis Date    Abnormal Pap smear     Atrial fibrillation     AV block, 1st degree 2012    Cataract     Depression 2012    Facet arthritis of lumbar region 3/31/2015    Hyperlipidemia     Hypertension 2012    Other specified cardiac dysrhythmias(427.89)     Syncope 2012       Past Surgical History:   Procedure Laterality Date    CARDIAC PACEMAKER PLACEMENT      COLONOSCOPY N/A 2015    [facility] (4TH FLR)    DILATION AND CURETTAGE OF UTERUS      Hx of precancerous cells?    INJECTION-STEROID-EPIDURAL-TRANSFORAMINAL Left 2015    Performed by [facility] at [facility] PAIN MGT    TONSILLECTOMY         OB History        2    Para   1    Term   1            AB   1    Living           SAB        TAB        Ectopic        Multiple        Live Births                     Current Outpatient Medications on File Prior to Visit   Medication Sig Dispense Refill    aspirin (ECOTRIN) 81 MG EC tablet Take 81 mg by mouth. 1 Tablet, Delayed Release (E.C.) Oral Every day      buPROPion HCl 450 mg Tb24 Take 450 mg by mouth once daily.       calcium-vitamin D 600 mg(1,500mg) -200 unit Tab Take 1 tablet by mouth once daily. 2 Tablets Oral Every day      cholecalciferol, vitamin D3, (VITAMIN D3) 5,000 unit Tab Take 5,000 Units by mouth once daily.      FLUoxetine (PROZAC) 20 MG capsule Take 20 mg by mouth once daily.    "   lisdexamfetamine (VYVANSE) 70 MG capsule Take 70 mg by mouth every morning.      losartan (COZAAR) 25 MG tablet TAKE 1 TABLET(25 MG) BY MOUTH EVERY DAY 90 tablet 3    metoprolol tartrate (LOPRESSOR) 25 MG tablet Take 1 tablet (25 mg total) by mouth 2 (two) times daily. 180 tablet 3    multivitamin-minerals-lutein (CENTRUM SILVER) Tab Take by mouth. 1 Tablet Oral Every day      pravastatin (PRAVACHOL) 10 MG tablet Take 1 tablet (10 mg total) by mouth once daily. 90 tablet 3    [DISCONTINUED] chlorhexidine (PERIDEX) 0.12 % solution Use as directed 15 mLs in the mouth or throat 2 (two) times daily.       [DISCONTINUED] neomycin-polymyxin-hydrocortisone (CORTISPORIN) 3.5-10,000-1 mg/mL-unit/mL-% otic suspension Place 3 drops into both ears 3 (three) times daily. 10 mL 2     No current facility-administered medications on file prior to visit.          ROS:  GENERAL: Feeling well overall.   SKIN: See HPI  HEAD: Denies head injury or headache.   CHEST: Denies chest pain or shortness of breath.   CARDIOVASCULAR: Denies palpitations or left sided chest pain.   ABDOMEN: No abdominal pain  URINARY: No frequency, dysuria, hematuria or burning on urination.  REPRODUCTIVE: See HPI.   HEMATOLOGIC: No easy bruisability or excessive bleeding.     Physical Exam:   /68   Ht 5' 6" (1.676 m)   Wt 72.6 kg (160 lb 0.9 oz)   LMP 10/01/2003   BMI 25.83 kg/m²   General: No distress, well appearing  HEENT: normocephalic, atraumatic   Heart: Regular rate  Lungs: No increased work of breathing  MS: She has brace on right LE  Pelvic Exam:     GENITALIA: There is small - 0.5 cm area on the left labia majora that is slightly firm - no obvious cystic lesions and suspect this is just inflammatory tissue from healing process. There is a pinpoint lesion more medial that appears to be draining milky white fluid. Non tender, no erythema or purulent drainage, no induration noted.    URETHRA: normal appearing   VAGINA: normal vaginal " mucosa, no lesions  PSYCH: Normal affect, mood appropriate       ASSESSMENT/PLAN: 68 here in follow up for labial cyst. Appears to be healing well s/p I&D with outside urgent care. She also received course of antibiotics. No evidence of overt infection. Doesn't appear to be anything to drain or to excise currently.     1. Expectant management for now  2. May use warm compress  3. RTO if cyst enlarges or returns    Carli Mccoy MD  Obstetrics and Gynecology  [facility]

## 2019-06-17 NOTE — LETTER
June 17, 2019      Iris Blue MD  [address]  [facility] [address]           [facility] - Opt[facility]  [address]  [facility] [address]  Phone: [phone]  Fax: [phone]          Patient: Manisha Wynne   MR Number: [id number]   YOB: 1950   Date of Visit: 6/17/2019       Dear Dr. Iris Blue:    Thank you for referring Manisha Wynne to me for evaluation. Attached you will find relevant portions of my assessment and plan of care.    If you have questions, please do not hesitate to call me. I look forward to following Manisha Wynne along with you.    Sincerely,    Libby Vaughan, OD    Enclosure  CC:  No Recipients    If you would like to receive this communication electronically, please contact [email or web address] or [phone] to request more information on [employer] Link access.    For providers and/or their staff who would like to refer a patient to [facility], please contact us through our one-stop-shop provider referral line, [facility], at [phone].    If you feel you have received this communication in error or would no longer like to receive these types of communications, please e-mail [email or web address]

## 2019-06-17 NOTE — PROGRESS NOTES
HPI     Last eye exam was 8/4/17 with Dr. Vaughan.  Patient states fell and broke glasses in end of May. All of her glasses   are broken. Currently using OTC readers. Concerned that PAL/bifocal is   causing her to fall. Patient states unaware if vision is distorted with   glasses. Fell earlier in May after feeling weak. Scheduled to see PCP. Was   mediating with eyes closed in wheelchair and felt like she was moving   around/floating in chair almost like vertigo.   Patient denies diplopia, headaches, flashes/floaters, and pain.      Last edited by Pari Curran on 6/17/2019  3:00 PM. (History)            Assessment /Plan     For exam results, see Encounter Report.    Examination of eyes and vision    Presbyopia    Nuclear sclerosis, bilateral    History of fall          1-2.  Bifocal and distance rx given.  Ok to continue with OTC readers.  3.  Educated on cataracts and affects on vision.  Not ready for surgery-monitor.  4.  Patient has fallen 3 times this year.  Eye health normal OU.            RTC 1 year for routine exam.

## 2019-06-18 ENCOUNTER — TELEPHONE (OUTPATIENT)
Dept: ENDOCRINOLOGY | Facility: CLINIC | Age: 69
End: 2019-06-18

## 2019-06-18 NOTE — TELEPHONE ENCOUNTER
----- Message from Tamara Ellis MA sent at 6/18/2019  9:38 AM CDT -----  Contact:   Patient   [phone]      ----- Message -----  From: Zahraa Mitchell  Sent: 6/18/2019   9:28 AM  To: Fede Harris Staff    Needs Advice    Reason for call:   appt         Communication Preference:   Phone      Additional Information:   Pt needs to speak with the nurse in regards to rescheduling her appt ..

## 2019-06-18 NOTE — TELEPHONE ENCOUNTER
Called pt and notified that Isadora jang have any open schedule right now . Pt is in re call and waiting list.

## 2019-06-19 ENCOUNTER — TELEPHONE (OUTPATIENT)
Dept: ENDOSCOPY | Facility: HOSPITAL | Age: 69
End: 2019-06-19

## 2019-06-19 NOTE — TELEPHONE ENCOUNTER
Spoke with patient. EUS scheduled for 7/5 at 8a. Reviewed prep instructions. Ms Wynne verbalized understanding.

## 2019-06-20 ENCOUNTER — OFFICE VISIT (OUTPATIENT)
Dept: ORTHOPEDICS | Facility: CLINIC | Age: 69
End: 2019-06-20
Payer: MEDICARE

## 2019-06-20 ENCOUNTER — HOSPITAL ENCOUNTER (OUTPATIENT)
Dept: RADIOLOGY | Facility: HOSPITAL | Age: 69
Discharge: HOME | End: 2019-06-20
Attending: NURSE PRACTITIONER
Payer: MEDICARE

## 2019-06-20 VITALS
HEART RATE: 75 BPM | DIASTOLIC BLOOD PRESSURE: 82 MMHG | TEMPERATURE: 98 F | HEIGHT: 66 IN | SYSTOLIC BLOOD PRESSURE: 136 MMHG | BODY MASS INDEX: 25.67 KG/M2 | WEIGHT: 159.75 LBS

## 2019-06-20 DIAGNOSIS — M25.561 RIGHT KNEE PAIN, UNSPECIFIED CHRONICITY: Primary | ICD-10-CM

## 2019-06-20 DIAGNOSIS — R52 PAIN: ICD-10-CM

## 2019-06-20 DIAGNOSIS — M25.561 ACUTE PAIN OF RIGHT KNEE: Primary | ICD-10-CM

## 2019-06-20 DIAGNOSIS — R52 PAIN: Primary | ICD-10-CM

## 2019-06-20 PROCEDURE — 73560 X-RAY EXAM OF KNEE 1 OR 2: CPT | Mod: 26,59,RT, | Performed by: RADIOLOGY

## 2019-06-20 PROCEDURE — 1101F PT FALLS ASSESS-DOCD LE1/YR: CPT | Mod: CPTII,S$GLB,, | Performed by: NURSE PRACTITIONER

## 2019-06-20 PROCEDURE — 99999 PR PBB SHADOW E&M-EST. PATIENT-LVL V: CPT | Mod: PBBFAC,,, | Performed by: NURSE PRACTITIONER

## 2019-06-20 PROCEDURE — 99213 PR OFFICE/OUTPT VISIT, EST, LEVL III, 20-29 MIN: ICD-10-PCS | Mod: S$GLB,,, | Performed by: NURSE PRACTITIONER

## 2019-06-20 PROCEDURE — 73562 X-RAY EXAM OF KNEE 3: CPT | Mod: TC,RT

## 2019-06-20 PROCEDURE — 73562 X-RAY EXAM OF KNEE 3: CPT | Mod: 26,RT,, | Performed by: RADIOLOGY

## 2019-06-20 PROCEDURE — 99999 PR PBB SHADOW E&M-EST. PATIENT-LVL V: ICD-10-PCS | Mod: PBBFAC,,, | Performed by: NURSE PRACTITIONER

## 2019-06-20 PROCEDURE — 3079F PR MOST RECENT DIASTOLIC BLOOD PRESSURE 80-89 MM HG: ICD-10-PCS | Mod: CPTII,S$GLB,, | Performed by: NURSE PRACTITIONER

## 2019-06-20 PROCEDURE — 1101F PR PT FALLS ASSESS DOC 0-1 FALLS W/OUT INJ PAST YR: ICD-10-PCS | Mod: CPTII,S$GLB,, | Performed by: NURSE PRACTITIONER

## 2019-06-20 PROCEDURE — 3075F PR MOST RECENT SYSTOLIC BLOOD PRESS GE 130-139MM HG: ICD-10-PCS | Mod: CPTII,S$GLB,, | Performed by: NURSE PRACTITIONER

## 2019-06-20 PROCEDURE — 73562 XR KNEE ORTHO RIGHT: ICD-10-PCS | Mod: 26,RT,, | Performed by: RADIOLOGY

## 2019-06-20 PROCEDURE — 3079F DIAST BP 80-89 MM HG: CPT | Mod: CPTII,S$GLB,, | Performed by: NURSE PRACTITIONER

## 2019-06-20 PROCEDURE — 3075F SYST BP GE 130 - 139MM HG: CPT | Mod: CPTII,S$GLB,, | Performed by: NURSE PRACTITIONER

## 2019-06-20 PROCEDURE — 73560 XR KNEE ORTHO RIGHT: ICD-10-PCS | Mod: 26,59,RT, | Performed by: RADIOLOGY

## 2019-06-20 PROCEDURE — 73560 X-RAY EXAM OF KNEE 1 OR 2: CPT | Mod: 59,TC,LT

## 2019-06-20 PROCEDURE — 99213 OFFICE O/P EST LOW 20 MIN: CPT | Mod: S$GLB,,, | Performed by: NURSE PRACTITIONER

## 2019-06-20 NOTE — PROGRESS NOTES
SUBJECTIVE:     Chief Complaint & History of Present Illness:  Manisha Wynne is a Established 68 y.o. year old female patient here with a history of constant right knee pain which started 3 weeks ago.  There is a history of trauma.  She reports she tripped and fell while visiting relatives in [place].  She went to [facility] and diagnosed with a patella fracture.  She was given a walker and a knee immobilizer.  The pain is located in the just below her patella.  The pain is described as throbbing, 5/10.  There is not radiation.  There is not catching or locking.  Aggravating factors include standing and walking.  Associated symptoms include swelling.  There is not numbness or tingling of the lower extremity.  There is not back pain. Previous treatments include brace knee immobilizer, OTC NSAIDs and rest which have provided adequate relief.  There is not a history of previous injury or surgery to the knee.  The patient does use an assistive device.  The patient also reports she has suffered frequent falls in the past few months.  She was treated for SSS and eventually had a pacemaker placed.  She has an appointment with her PCP to discuss her frequent falling tomorrow.    Review of patient's allergies indicates:  No Known Allergies      Current Outpatient Medications   Medication Sig Dispense Refill    aspirin (ECOTRIN) 81 MG EC tablet Take 81 mg by mouth. 1 Tablet, Delayed Release (E.C.) Oral Every day      buPROPion HCl 450 mg Tb24 Take 450 mg by mouth once daily.       calcium-vitamin D 600 mg(1,500mg) -200 unit Tab Take 1 tablet by mouth once daily. 2 Tablets Oral Every day      cholecalciferol, vitamin D3, (VITAMIN D3) 5,000 unit Tab Take 5,000 Units by mouth once daily.      FLUoxetine (PROZAC) 20 MG capsule Take 20 mg by mouth once daily.      lisdexamfetamine (VYVANSE) 70 MG capsule Take 70 mg by mouth every morning.      losartan (COZAAR) 25 MG tablet TAKE 1 TABLET(25 MG) BY MOUTH EVERY DAY  90 tablet 3    metoprolol tartrate (LOPRESSOR) 25 MG tablet Take 1 tablet (25 mg total) by mouth 2 (two) times daily. 180 tablet 3    multivitamin-minerals-lutein (CENTRUM SILVER) Tab Take by mouth. 1 Tablet Oral Every day      pravastatin (PRAVACHOL) 10 MG tablet Take 1 tablet (10 mg total) by mouth once daily. 90 tablet 3     No current facility-administered medications for this visit.        Past Medical History:   Diagnosis Date    Abnormal Pap smear     Atrial fibrillation     AV block, 1st degree 7/25/2012    Cataract     Depression 7/24/2012    Facet arthritis of lumbar region 3/31/2015    Falls     3 falls in the last 6 mos--noted 6/19/19    Hyperlipidemia     Hypertension 7/24/2012    Other specified cardiac dysrhythmias(427.89)     Syncope 7/24/2012       Past Surgical History:   Procedure Laterality Date    CARDIAC PACEMAKER PLACEMENT  09/07/2012    [id number]    COLONOSCOPY N/A 8/7/2015    [facility]rinivas [facility]ena [facility] ([facility] FLR)    DILATION AND CURETTAGE OF UTERUS      Hx of precancerous cells?    INJECTION-STEROID-EPIDURAL-TRANSFORAMINAL Left 6/25/2015    [facility] Rafita Booker [facility] PAIN MGT    TONSILLECTOMY         Family History   Adopted: Yes   Problem Relation Age of Onset    Amblyopia Neg Hx     Blindness Neg Hx     Cataracts Neg Hx     Glaucoma Neg Hx     Macular degeneration Neg Hx     Retinal detachment Neg Hx          Review of Systems:  ROS:  Constitutional: no fever or chills  Eyes: no visual changes  ENT: no nasal congestion or sore throat  Respiratory: no cough or shortness of breath  Cardiovascular: no chest pain or palpitations  Gastrointestinal: no nausea or vomiting, tolerating diet  Genitourinary: no hematuria or dysuria  Integument/Breast: no rash or pruritis  Hematologic/Lymphatic: no easy bruising or lymphadenopathy  Musculoskeletal: right patealla fracture  Neurological: no seizures or tremors  Behavioral/Psych: no  "auditory or visual hallucinations  Endocrine: no heat or cold intolerance      OBJECTIVE:     PHYSICAL EXAM:  Vital Signs (Most Recent)  Vitals:    06/20/19 1456   BP: 136/82   Pulse: 75   Temp: 98.1 °F (36.7 °C)     Height: 5' 6" (167.6 cm) Weight: 72.4 kg (159 lb 11.6 oz),   General Appearance: Well nourished, well developed, in no acute distress.  HENT: Normal cephalic, oropharynx pink and moist  Eyes: PERRLA bilaterally and EOM x 4  Respiratory: Even and unlabored  Skin: Warm and Dry.   Psychiatric: AAO x 4, Mood & affect are normal.    right  Knee Exam:  Knee Range of Motion:normal   Effusion:not significant  Condition of skin:intact  Location of tenderness:medial aspect just below patella   Strength:normal  Stability:  stable to testing, Lachman: stable, LCL: stable, MCL: stable and PCL: stable  Varus /Valgus stress:  normal  Julio:   negative      Hip Examination:  full painless range of motion, without tenderness    RADIOGRAPHS:  X-ray of right knee obtained today, personally reviewed by me.  No fracture or dislocation noted in knee joint.  Patella appears intact without fracture or dislocation.    ASSESSMENT/PLAN:       ICD-10-CM ICD-9-CM   1. Acute pain of right knee M25.561 719.46   2. Pain R52 780.96       Plan: We discussed with the patient at length all the different treatment options available for  the knee including anti-inflammatories, acetaminophen, rest, ice, knee strengthening exercise, occasional cortisone injections for temporary relief, Viscosupplimentation injections, arthroscopic debridement osteotomy, and finally knee arthroplasty.     -Patient with 3 week history of fall injuring her right knee in [place].  -X-ray negative for fracture to patella today.  -Recommend knee immobilizer for stabilization.  -Continue Tylenol PRN for pain control.  Avoid NSAIDs given cardiac history.  -Ice and elevate PRN.  -Continue using walker given fall history and follow up with PCP to continue " workup.  -Follow up in clinic in 6 weeks or sooner for new or worsening pain.  At time will need right patella x-ray.

## 2019-06-20 NOTE — LETTER
June 20, 2019      [facility]  [address], [address]  [facility] [address]  Phone: [phone]       Patient: Manisha Wynne   YOB: 1950  Date of Visit: 06/20/2019    To Whom It May Concern:    Nydia Wynne  was at [facility] on 06/20/2019.  [employer] may return to work on 6/25/2019 With no restrictions. If you have any questions or concerns, or if I can be of further assistance, please do not hesitate to contact me.    Sincerely,    Marlo Vergara NP

## 2019-06-21 ENCOUNTER — OFFICE VISIT (OUTPATIENT)
Dept: INTERNAL MEDICINE | Facility: CLINIC | Age: 69
End: 2019-06-21
Payer: MEDICARE

## 2019-06-21 ENCOUNTER — LAB VISIT (OUTPATIENT)
Dept: LAB | Facility: HOSPITAL | Age: 69
End: 2019-06-21
Attending: INTERNAL MEDICINE
Payer: MEDICARE

## 2019-06-21 ENCOUNTER — TELEPHONE (OUTPATIENT)
Dept: ENDOCRINOLOGY | Facility: CLINIC | Age: 69
End: 2019-06-21

## 2019-06-21 ENCOUNTER — DOCUMENTATION ONLY (OUTPATIENT)
Dept: CARDIOLOGY | Facility: HOSPITAL | Age: 69
End: 2019-06-21

## 2019-06-21 ENCOUNTER — CLINICAL SUPPORT (OUTPATIENT)
Dept: CARDIOLOGY | Facility: HOSPITAL | Age: 69
End: 2019-06-21
Attending: INTERNAL MEDICINE
Payer: MEDICARE

## 2019-06-21 VITALS
WEIGHT: 158.75 LBS | HEART RATE: 69 BPM | TEMPERATURE: 98 F | SYSTOLIC BLOOD PRESSURE: 100 MMHG | OXYGEN SATURATION: 99 % | DIASTOLIC BLOOD PRESSURE: 60 MMHG | HEIGHT: 66 IN | BODY MASS INDEX: 25.51 KG/M2

## 2019-06-21 DIAGNOSIS — E21.5 PARATHYROID ABNORMALITY: ICD-10-CM

## 2019-06-21 DIAGNOSIS — E03.9 HYPOTHYROIDISM, UNSPECIFIED TYPE: ICD-10-CM

## 2019-06-21 DIAGNOSIS — D64.9 ANEMIA, UNSPECIFIED TYPE: ICD-10-CM

## 2019-06-21 DIAGNOSIS — E55.9 MILD VITAMIN D DEFICIENCY: ICD-10-CM

## 2019-06-21 DIAGNOSIS — R29.6 FREQUENT FALLS: Primary | ICD-10-CM

## 2019-06-21 DIAGNOSIS — R29.6 FREQUENT FALLS: ICD-10-CM

## 2019-06-21 DIAGNOSIS — I10 ESSENTIAL HYPERTENSION: Chronic | ICD-10-CM

## 2019-06-21 DIAGNOSIS — E78.2 MIXED HYPERLIPIDEMIA: Chronic | ICD-10-CM

## 2019-06-21 DIAGNOSIS — R73.9 HYPERGLYCEMIA: ICD-10-CM

## 2019-06-21 DIAGNOSIS — R20.2 PARESTHESIA: ICD-10-CM

## 2019-06-21 DIAGNOSIS — Z95.0 CARDIAC PACEMAKER IN SITU: ICD-10-CM

## 2019-06-21 LAB
25(OH)D3+25(OH)D2 SERPL-MCNC: 68 NG/ML (ref 30–96)
ALBUMIN SERPL BCP-MCNC: 4.3 G/DL (ref 3.5–5.2)
ALP SERPL-CCNC: 80 U/L (ref 55–135)
ALT SERPL W/O P-5'-P-CCNC: 52 U/L (ref 10–44)
ANION GAP SERPL CALC-SCNC: 9 MMOL/L (ref 8–16)
AST SERPL-CCNC: 39 U/L (ref 10–40)
BASOPHILS # BLD AUTO: 0.03 K/UL (ref 0–0.2)
BASOPHILS NFR BLD: 0.4 % (ref 0–1.9)
BILIRUB SERPL-MCNC: 0.8 MG/DL (ref 0.1–1)
BUN SERPL-MCNC: 29 MG/DL (ref 8–23)
CALCIUM SERPL-MCNC: 10.5 MG/DL (ref 8.7–10.5)
CHLORIDE SERPL-SCNC: 104 MMOL/L (ref 95–110)
CO2 SERPL-SCNC: 27 MMOL/L (ref 23–29)
CREAT SERPL-MCNC: 1.3 MG/DL (ref 0.5–1.4)
CRP SERPL-MCNC: 0.4 MG/L (ref 0–8.2)
DIFFERENTIAL METHOD: ABNORMAL
EOSINOPHIL # BLD AUTO: 0.2 K/UL (ref 0–0.5)
EOSINOPHIL NFR BLD: 2.6 % (ref 0–8)
ERYTHROCYTE [DISTWIDTH] IN BLOOD BY AUTOMATED COUNT: 13.6 % (ref 11.5–14.5)
ERYTHROCYTE [SEDIMENTATION RATE] IN BLOOD BY WESTERGREN METHOD: 9 MM/HR (ref 0–36)
EST. GFR  (AFRICAN AMERICAN): 48 ML/MIN/1.73 M^2
EST. GFR  (NON AFRICAN AMERICAN): 41.7 ML/MIN/1.73 M^2
ESTIMATED AVG GLUCOSE: 108 MG/DL (ref 68–131)
FERRITIN SERPL-MCNC: 131 NG/ML (ref 20–300)
GLUCOSE SERPL-MCNC: 85 MG/DL (ref 70–110)
HBA1C MFR BLD HPLC: 5.4 % (ref 4–5.6)
HCT VFR BLD AUTO: 34.5 % (ref 37–48.5)
HGB BLD-MCNC: 11.3 G/DL (ref 12–16)
IMM GRANULOCYTES # BLD AUTO: 0.03 K/UL (ref 0–0.04)
IMM GRANULOCYTES NFR BLD AUTO: 0.4 % (ref 0–0.5)
IRON SERPL-MCNC: 78 UG/DL (ref 30–160)
LYMPHOCYTES # BLD AUTO: 1.7 K/UL (ref 1–4.8)
LYMPHOCYTES NFR BLD: 24.6 % (ref 18–48)
MCH RBC QN AUTO: 31.2 PG (ref 27–31)
MCHC RBC AUTO-ENTMCNC: 32.8 G/DL (ref 32–36)
MCV RBC AUTO: 95 FL (ref 82–98)
MONOCYTES # BLD AUTO: 0.7 K/UL (ref 0.3–1)
MONOCYTES NFR BLD: 10.4 % (ref 4–15)
NEUTROPHILS # BLD AUTO: 4.3 K/UL (ref 1.8–7.7)
NEUTROPHILS NFR BLD: 61.6 % (ref 38–73)
NRBC BLD-RTO: 0 /100 WBC
PLATELET # BLD AUTO: 196 K/UL (ref 150–350)
PMV BLD AUTO: 11.5 FL (ref 9.2–12.9)
POTASSIUM SERPL-SCNC: 4.3 MMOL/L (ref 3.5–5.1)
PROT SERPL-MCNC: 7.6 G/DL (ref 6–8.4)
RBC # BLD AUTO: 3.62 M/UL (ref 4–5.4)
SATURATED IRON: 21 % (ref 20–50)
SODIUM SERPL-SCNC: 140 MMOL/L (ref 136–145)
TOTAL IRON BINDING CAPACITY: 380 UG/DL (ref 250–450)
TRANSFERRIN SERPL-MCNC: 257 MG/DL (ref 200–375)
TSH SERPL DL<=0.005 MIU/L-ACNC: 3.87 UIU/ML (ref 0.4–4)
VIT B12 SERPL-MCNC: 818 PG/ML (ref 210–950)
WBC # BLD AUTO: 7.02 K/UL (ref 3.9–12.7)

## 2019-06-21 PROCEDURE — 3078F DIAST BP <80 MM HG: CPT | Mod: CPTII,S$GLB,, | Performed by: INTERNAL MEDICINE

## 2019-06-21 PROCEDURE — 1101F PT FALLS ASSESS-DOCD LE1/YR: CPT | Mod: CPTII,S$GLB,, | Performed by: INTERNAL MEDICINE

## 2019-06-21 PROCEDURE — 99214 PR OFFICE/OUTPT VISIT, EST, LEVL IV, 30-39 MIN: ICD-10-PCS | Mod: S$GLB,,, | Performed by: INTERNAL MEDICINE

## 2019-06-21 PROCEDURE — 85025 COMPLETE CBC W/AUTO DIFF WBC: CPT

## 2019-06-21 PROCEDURE — 1101F PR PT FALLS ASSESS DOC 0-1 FALLS W/OUT INJ PAST YR: ICD-10-PCS | Mod: CPTII,S$GLB,, | Performed by: INTERNAL MEDICINE

## 2019-06-21 PROCEDURE — 82306 VITAMIN D 25 HYDROXY: CPT

## 2019-06-21 PROCEDURE — 83036 HEMOGLOBIN GLYCOSYLATED A1C: CPT

## 2019-06-21 PROCEDURE — 84165 PATHOLOGIST INTERPRETATION SPE: ICD-10-PCS | Mod: 26,,, | Performed by: PATHOLOGY

## 2019-06-21 PROCEDURE — 84165 PROTEIN E-PHORESIS SERUM: CPT

## 2019-06-21 PROCEDURE — 3074F PR MOST RECENT SYSTOLIC BLOOD PRESSURE < 130 MM HG: ICD-10-PCS | Mod: CPTII,S$GLB,, | Performed by: INTERNAL MEDICINE

## 2019-06-21 PROCEDURE — 80053 COMPREHEN METABOLIC PANEL: CPT

## 2019-06-21 PROCEDURE — 82607 VITAMIN B-12: CPT

## 2019-06-21 PROCEDURE — 99999 PR PBB SHADOW E&M-EST. PATIENT-LVL V: ICD-10-PCS | Mod: PBBFAC,,, | Performed by: INTERNAL MEDICINE

## 2019-06-21 PROCEDURE — 84165 PROTEIN E-PHORESIS SERUM: CPT | Mod: 26,,, | Performed by: PATHOLOGY

## 2019-06-21 PROCEDURE — 84443 ASSAY THYROID STIM HORMONE: CPT

## 2019-06-21 PROCEDURE — 93280 PM DEVICE PROGR EVAL DUAL: CPT

## 2019-06-21 PROCEDURE — 99214 OFFICE O/P EST MOD 30 MIN: CPT | Mod: S$GLB,,, | Performed by: INTERNAL MEDICINE

## 2019-06-21 PROCEDURE — 86140 C-REACTIVE PROTEIN: CPT

## 2019-06-21 PROCEDURE — 85652 RBC SED RATE AUTOMATED: CPT

## 2019-06-21 PROCEDURE — 83540 ASSAY OF IRON: CPT

## 2019-06-21 PROCEDURE — 82728 ASSAY OF FERRITIN: CPT

## 2019-06-21 PROCEDURE — 99999 PR PBB SHADOW E&M-EST. PATIENT-LVL V: CPT | Mod: PBBFAC,,, | Performed by: INTERNAL MEDICINE

## 2019-06-21 PROCEDURE — 83921 ORGANIC ACID SINGLE QUANT: CPT

## 2019-06-21 PROCEDURE — 3078F PR MOST RECENT DIASTOLIC BLOOD PRESSURE < 80 MM HG: ICD-10-PCS | Mod: CPTII,S$GLB,, | Performed by: INTERNAL MEDICINE

## 2019-06-21 PROCEDURE — 3074F SYST BP LT 130 MM HG: CPT | Mod: CPTII,S$GLB,, | Performed by: INTERNAL MEDICINE

## 2019-06-21 NOTE — PROGRESS NOTES
Received a call from Dr. Sanchez, pt's PCP on this am.  She requested to have patient's Adam pacemaker checked on today.  Patient had a fall on 5/31/19.  PCP requested message to be sent to her following pacemaker check.      Former patient of Dr. Whelan, pt elected to be followed by Dr. Sheets.  Information updated in [facility].  Patient does not have a remote monitor.

## 2019-06-23 NOTE — PROGRESS NOTES
Subjective:       Patient ID: Manisha Wynne is a 68 y.o. female.    Chief Complaint: Follow-up    HPIPt had her third fall this year - last two falls in one month.  Fall in January she had a nasal bone fracture.  Fall early May in [place] she dislocated her finger and required stiches around her eye.  Fall in [place] 5/31/19 she fractured her patella and required stiches on her forehead.  SHe was walking with each fall  Denies full loss of consciousness, no seizure activity - no loss of bladder or bowel.  No CP although sometimes feels a bit shaky.  No SOB.  Has had multiple CT head - no lesion or stroke - cannot have MRI due to pacer.  The falls occur unexpectedly with no prodrome.   Review of Systems   Constitutional: Positive for unexpected weight change. Negative for activity change.   HENT: Positive for hearing loss. Negative for rhinorrhea and trouble swallowing.    Eyes: Positive for visual disturbance. Negative for discharge.   Respiratory: Negative for chest tightness, shortness of breath (PND or orthopnea) and wheezing.    Cardiovascular: Negative for chest pain and palpitations.   Gastrointestinal: Negative for abdominal pain, blood in stool, constipation, diarrhea, nausea and vomiting.   Endocrine: Negative for polydipsia and polyuria.   Genitourinary: Negative for difficulty urinating, dysuria, hematuria and menstrual problem.   Musculoskeletal: Negative for arthralgias, joint swelling and neck pain.   Neurological: Positive for weakness. Negative for seizures, syncope and headaches.   Psychiatric/Behavioral: Positive for dysphoric mood. Negative for confusion.       Objective:      Physical Exam   Constitutional: She is oriented to person, place, and time. She appears well-developed and well-nourished. No distress.   HENT:   Head: Normocephalic.   Mouth/Throat: Oropharynx is clear and moist.   Neck: Neck supple. No JVD present. No thyromegaly present.   Cardiovascular: Normal rate, regular  rhythm, normal heart sounds and intact distal pulses. Exam reveals no gallop and no friction rub.   No murmur heard.  Pulmonary/Chest: Effort normal and breath sounds normal. She has no wheezes. She has no rales.   Abdominal: Soft. Bowel sounds are normal. She exhibits no distension and no mass. There is no tenderness. There is no rebound and no guarding.   Musculoskeletal: She exhibits no edema.   Lymphadenopathy:     She has no cervical adenopathy.   Neurological: She is alert and oriented to person, place, and time. She has normal reflexes.   Skin: Skin is warm and dry.   Psychiatric: She has a normal mood and affect. Her behavior is normal. Judgment and thought content normal.       Assessment:       1. Frequent falls    2. Anemia, unspecified type    3. Hypothyroidism, unspecified type    4. Hyperglycemia    5. Mild vitamin D deficiency    6. Paresthesia    7. Parathyroid abnormality    8. Essential hypertension    9. Mixed hyperlipidemia        Plan:   Frequent falls  -     CBC auto differential; Future; Expected date: 06/21/2019  -     Comprehensive metabolic panel; Future; Expected date: 06/21/2019  -     Sedimentation rate; Future; Expected date: 06/21/2019  -     C-reactive protein; Future; Expected date: 06/21/2019  -     Protein electrophoresis, serum; Future; Expected date: 06/21/2019  -     Ambulatory consult to Neurology  -     Ambulatory Referral to Electrophysiology    Anemia, unspecified type  -     Iron and TIBC; Future; Expected date: 06/21/2019  -     Ferritin; Future; Expected date: 06/21/2019  -     Methylmalonic acid, serum; Future; Expected date: 06/21/2019    Hypothyroidism, unspecified type  -     TSH; Future; Expected date: 06/21/2019    Hyperglycemia  -     Hemoglobin A1c; Future; Expected date: 06/21/2019    Mild vitamin D deficiency  -     Vitamin D; Future; Expected date: 06/21/2019    Paresthesia  -     Vitamin B12; Future; Expected date: 06/21/2019    Parathyroid abnormality  -      Cancel: CT Chest With Contrast; Future; Expected date: 06/21/2019  -     CT Chest With Contrast; Future; Expected date: 06/21/2019    Essential hypertension  Overcontrolled decrease losartan to half a tablet  Mixed hyperlipidemia  Controlled - continue current meds

## 2019-06-24 LAB
ALBUMIN SERPL ELPH-MCNC: 4.38 G/DL (ref 3.35–5.55)
ALPHA1 GLOB SERPL ELPH-MCNC: 0.28 G/DL (ref 0.17–0.41)
ALPHA2 GLOB SERPL ELPH-MCNC: 0.65 G/DL (ref 0.43–0.99)
B-GLOBULIN SERPL ELPH-MCNC: 0.87 G/DL (ref 0.5–1.1)
GAMMA GLOB SERPL ELPH-MCNC: 0.92 G/DL (ref 0.67–1.58)
PATHOLOGIST INTERPRETATION SPE: NORMAL
PROT SERPL-MCNC: 7.1 G/DL (ref 6–8.4)

## 2019-06-25 ENCOUNTER — OFFICE VISIT (OUTPATIENT)
Dept: NEUROLOGY | Facility: CLINIC | Age: 69
End: 2019-06-25
Payer: MEDICARE

## 2019-06-25 VITALS
WEIGHT: 159.19 LBS | HEIGHT: 66 IN | HEART RATE: 72 BPM | DIASTOLIC BLOOD PRESSURE: 63 MMHG | SYSTOLIC BLOOD PRESSURE: 115 MMHG | BODY MASS INDEX: 25.58 KG/M2

## 2019-06-25 DIAGNOSIS — R26.9 NEUROLOGIC GAIT DISORDER: Primary | ICD-10-CM

## 2019-06-25 LAB — METHYLMALONATE SERPL-SCNC: 0.29 UMOL/L

## 2019-06-25 PROCEDURE — 3078F PR MOST RECENT DIASTOLIC BLOOD PRESSURE < 80 MM HG: ICD-10-PCS | Mod: CPTII,S$GLB,, | Performed by: NEUROMUSCULOSKELETAL MEDICINE & OMM

## 2019-06-25 PROCEDURE — 99999 PR PBB SHADOW E&M-EST. PATIENT-LVL III: ICD-10-PCS | Mod: PBBFAC,,, | Performed by: NEUROMUSCULOSKELETAL MEDICINE & OMM

## 2019-06-25 PROCEDURE — 99205 PR OFFICE/OUTPT VISIT, NEW, LEVL V, 60-74 MIN: ICD-10-PCS | Mod: S$GLB,,, | Performed by: NEUROMUSCULOSKELETAL MEDICINE & OMM

## 2019-06-25 PROCEDURE — 3078F DIAST BP <80 MM HG: CPT | Mod: CPTII,S$GLB,, | Performed by: NEUROMUSCULOSKELETAL MEDICINE & OMM

## 2019-06-25 PROCEDURE — 99205 OFFICE O/P NEW HI 60 MIN: CPT | Mod: S$GLB,,, | Performed by: NEUROMUSCULOSKELETAL MEDICINE & OMM

## 2019-06-25 PROCEDURE — 3074F PR MOST RECENT SYSTOLIC BLOOD PRESSURE < 130 MM HG: ICD-10-PCS | Mod: CPTII,S$GLB,, | Performed by: NEUROMUSCULOSKELETAL MEDICINE & OMM

## 2019-06-25 PROCEDURE — 99999 PR PBB SHADOW E&M-EST. PATIENT-LVL III: CPT | Mod: PBBFAC,,, | Performed by: NEUROMUSCULOSKELETAL MEDICINE & OMM

## 2019-06-25 PROCEDURE — 1101F PT FALLS ASSESS-DOCD LE1/YR: CPT | Mod: CPTII,S$GLB,, | Performed by: NEUROMUSCULOSKELETAL MEDICINE & OMM

## 2019-06-25 PROCEDURE — 3074F SYST BP LT 130 MM HG: CPT | Mod: CPTII,S$GLB,, | Performed by: NEUROMUSCULOSKELETAL MEDICINE & OMM

## 2019-06-25 PROCEDURE — 1101F PR PT FALLS ASSESS DOC 0-1 FALLS W/OUT INJ PAST YR: ICD-10-PCS | Mod: CPTII,S$GLB,, | Performed by: NEUROMUSCULOSKELETAL MEDICINE & OMM

## 2019-06-25 NOTE — PROGRESS NOTES
Manisha Echevarria Sherrell  1950  Review of patient's allergies indicates:  No Known Allergies  [unfilled]    Past Medical History:   Diagnosis Date    Abnormal Pap smear     Atrial fibrillation     AV block, 1st degree 2012    Cataract     Depression 2012    Facet arthritis of lumbar region 3/31/2015    Falls     3 falls in the last 6 mos--noted 19    Hyperlipidemia     Hypertension 2012    Other specified cardiac dysrhythmias(427.89)     Syncope 2012     Social History     Socioeconomic History    Marital status: Single     Spouse name: Not on file    Number of children: Not on file    Years of education: Not on file    Highest education level: Not on file   Occupational History    Occupation: /Rabbi     Employer: [employer]   Social Needs    Financial resource strain: Somewhat hard    Food insecurity:     Worry: Never true     Inability: Never true    Transportation needs:     Medical: No     Non-medical: Yes   Tobacco Use    Smoking status: Former Smoker     Packs/day: 0.25     Years: 15.00     Pack years: 3.75     Last attempt to quit: 1982     Years since quittin.9    Smokeless tobacco: Never Used   Substance and Sexual Activity    Alcohol use: Yes     Frequency: 2-4 times a month     Drinks per session: 1 or 2     Binge frequency: Never     Comment: occ    Drug use: No    Sexual activity: Not Currently   Lifestyle    Physical activity:     Days per week: 1 day     Minutes per session: 20 min    Stress: Only a little   Relationships    Social connections:     Talks on phone: More than three times a week     Gets together: Once a week     Attends [employer] service: Not on file     Active member of club or organization: Yes     Attends meetings of clubs or organizations: 1 to 4 times per year     Relationship status:    Other Topics Concern    Patient feels they ought to cut down on drinking/drug use Not Asked     Patient annoyed by others criticizing their drinking/drug use Not Asked    Patient has felt bad or guilty about drinking/drug use Not Asked    Patient has had a drink/used drugs as an eye opener in the AM Not Asked   Social History Narrative    Not on file     Family History   Adopted: Yes   Problem Relation Age of Onset    Amblyopia Neg Hx     Blindness Neg Hx     Cataracts Neg Hx     Glaucoma Neg Hx     Macular degeneration Neg Hx     Retinal detachment Neg Hx        Review of systems:  Constitutional-negative  Eyes-negative  ENT, mouth-negative  Cardiovascular-negative  Respiratory-negative  GI-negative  - negative  Musculoskeletal-negative  Skin-negative  Neurologic-negative  Psychiatric-negative  Endocrine-negative  Hematology/lymph nodes-negative  Allergies/immunology-negative  Manisha Wynne  1950  Review of patient's allergies indicates:  No Known Allergies  [unfilled]    Past Medical History:   Diagnosis Date    Abnormal Pap smear     Atrial fibrillation     AV block, 1st degree 7/25/2012    Cataract     Depression 7/24/2012    Facet arthritis of lumbar region 3/31/2015    Falls     3 falls in the last 6 mos--noted 6/19/19    Hyperlipidemia     Hypertension 7/24/2012    Other specified cardiac dysrhythmias(427.89)     Syncope 7/24/2012     Social History     Socioeconomic History    Marital status: Single     Spouse name: Not on file    Number of children: Not on file    Years of education: Not on file    Highest education level: Not on file   Occupational History    Occupation: /Rabbi     Employer: [employer]   Social Needs    Financial resource strain: Somewhat hard    Food insecurity:     Worry: Never true     Inability: Never true    Transportation needs:     Medical: No     Non-medical: Yes   Tobacco Use    Smoking status: Former Smoker     Packs/day: 0.25     Years: 15.00     Pack years: 3.75     Last attempt to quit: 7/25/1982      Years since quittin.9    Smokeless tobacco: Never Used   Substance and Sexual Activity    Alcohol use: Yes     Frequency: 2-4 times a month     Drinks per session: 1 or 2     Binge frequency: Never     Comment: occ    Drug use: No    Sexual activity: Not Currently   Lifestyle    Physical activity:     Days per week: 1 day     Minutes per session: 20 min    Stress: Only a little   Relationships    Social connections:     Talks on phone: More than three times a week     Gets together: Once a week     Attends [employer] service: Not on file     Active member of club or organization: Yes     Attends meetings of clubs or organizations: 1 to 4 times per year     Relationship status:    Other Topics Concern    Patient feels they ought to cut down on drinking/drug use Not Asked    Patient annoyed by others criticizing their drinking/drug use Not Asked    Patient has felt bad or guilty about drinking/drug use Not Asked    Patient has had a drink/used drugs as an eye opener in the AM Not Asked   Social History Narrative    Not on file     Family History   Adopted: Yes   Problem Relation Age of Onset    Amblyopia Neg Hx     Blindness Neg Hx     Cataracts Neg Hx     Glaucoma Neg Hx     Macular degeneration Neg Hx     Retinal detachment Neg Hx        Review of systems:  Constitutional-negative  Eyes-negative  ENT, mouth-negative  Cardiovascular-negative  Respiratory-negative  GI-negative  - negative  Musculoskeletal-negative  Skin-negative  Neurologic-negative  Psychiatric-negative  Endocrine-negative  Hematology/lymph nodes-negative  Allergies/immunology-negative  Gen. 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  Present history:   This is a 70-year-old white female referred for right by who presents  with several episodes of falling dating back to 2012.  Patient was recently at a conference in May in [place] and fell angelita even more recently on May 31st fell again at which time she fractured her kneecap..  She states that she does not feel the floor like she think she should. In   2015 she had a bad bout of left sciatica lasting 6 months for which she eventually had an epidural steroid injection to cleared up.  History chronic renal disease possibly a source of neuropathy    Neurological Exam:  Mental status-alert and oriented to person, place, and time; attention span and concentration is good. Fund of knowledge-patient is aware of current events and able to give detailed history of the current problem.recent and remote memory seems intact. Language function is normal with no evidence of aphasia  Cranial nerves:Visual acuity to hand chart -normal; visual fields to confrontation normal;pupils were equal and reactive to light ;no evidence of ptosis ;  funduscopic examination was normal with sharp disc margins. external ocular movements were full with no nystagmus. Facial sensation to pinprick : normal ; corneal reflexes intact; Facial muscles were symmetrical. Hearing is unimpaired symmetrical finger rub; Tongue movements - normal ; palate movements - normal ;Swallowing unimpaired. Shoulder shrug was intact with good strength Speech was normal  Motor examination: Upper : normal                                      Lower extremities -poor heel walk; hammertoes are evident.  tone was normal ;                  Right-handed  Sensory examination:   Upper; normal pinprick and soft touch ;   Lower extremities - normal and symmetrical.   Vibration sense:  Absent @ toes  Deep tendon reflexes: upper extremities :1-2+ symmetrical ;     lower extremities KJ- left -3 +; AJ - 1/2+ Both plantar responses were flexor  Cerebellar examination upper: Normal finger to nose and rapid alternating movements  Gait:  Limited gait due  to knee fracture.    heel and toe walk normal  Romberg test:  Positive       Tandem gait:  ReportInvoluntary movements: Negative  TMJ - no tenderness  Cervical examination: Full range of motion with no pain Cervical tenderness :negative  Lumbar examination: Low back tenderness-negative                  Sciatic notchtenderness-negative            Straight leg raising : negative    Impression:  Rule out peripheral neuropathy; lumbar disc disease with sciatica in the past  Recommendations/Plan :  EMG nerve conduction studies lower extremities

## 2019-06-25 NOTE — LETTER
June 25, 2019      Iris Blue MD  [address]  [facility] [address]           [facility]  [address] [facility] [address]  Phone: [phone]          Patient: Manisha Wynne   MR Number: [id number]   YOB: 1950   Date of Visit: 6/25/2019       Dear Dr. Iris Blue:    Thank you for referring Manisha Wynne to me for evaluation. Attached you will find relevant portions of my assessment and plan of care.    If you have questions, please do not hesitate to call me. I look forward to following Manisha Wynne along with you.    Sincerely,    Sammy Romero MD    Enclosure  CC:  No Recipients    If you would like to receive this communication electronically, please contact [email or web address] or [phone] to request more information on [facility] access.    For providers and/or their staff who would like to refer a patient to [facility], please contact us through our one-stop-shop provider referral line, [facility], at [phone].    If you feel you have received this communication in error or would no longer like to receive these types of communications, please e-mail [email or web address]

## 2019-06-29 ENCOUNTER — HOSPITAL ENCOUNTER (OUTPATIENT)
Dept: RADIOLOGY | Facility: HOSPITAL | Age: 69
Discharge: HOME | End: 2019-06-29
Attending: INTERNAL MEDICINE
Payer: MEDICARE

## 2019-06-29 DIAGNOSIS — E21.5 PARATHYROID ABNORMALITY: ICD-10-CM

## 2019-06-29 PROCEDURE — 70492 CT NECK PARATHYROID (4D): ICD-10-PCS | Mod: 26,,, | Performed by: RADIOLOGY

## 2019-06-29 PROCEDURE — 70492 CT SFT TSUE NCK W/O & W/DYE: CPT | Mod: 26,,, | Performed by: RADIOLOGY

## 2019-06-29 PROCEDURE — 70492 CT SFT TSUE NCK W/O & W/DYE: CPT | Mod: TC

## 2019-06-29 PROCEDURE — 25500020 PHARM REV CODE 255: Performed by: INTERNAL MEDICINE

## 2019-06-29 RX ADMIN — IOHEXOL 75 ML: 350 INJECTION, SOLUTION INTRAVENOUS at 11:06

## 2019-07-01 ENCOUNTER — OFFICE VISIT (OUTPATIENT)
Dept: PSYCHIATRY | Facility: CLINIC | Age: 69
End: 2019-07-01
Payer: MEDICARE

## 2019-07-01 DIAGNOSIS — F33.41 MAJOR DEPRESSIVE DISORDER, RECURRENT EPISODE, IN PARTIAL REMISSION: Primary | ICD-10-CM

## 2019-07-01 DIAGNOSIS — F98.8 ATTENTION DEFICIT DISORDER (ADD) IN ADULT: ICD-10-CM

## 2019-07-01 PROCEDURE — 99999 PR PBB SHADOW E&M-EST. PATIENT-LVL I: ICD-10-PCS | Mod: PBBFAC,,, | Performed by: SOCIAL WORKER

## 2019-07-01 PROCEDURE — 90834 PR PSYCHOTHERAPY W/PATIENT, 45 MIN: ICD-10-PCS | Mod: S$GLB,,, | Performed by: SOCIAL WORKER

## 2019-07-01 PROCEDURE — 99999 PR PBB SHADOW E&M-EST. PATIENT-LVL I: CPT | Mod: PBBFAC,,, | Performed by: SOCIAL WORKER

## 2019-07-01 PROCEDURE — 90834 PSYTX W PT 45 MINUTES: CPT | Mod: S$GLB,,, | Performed by: SOCIAL WORKER

## 2019-07-01 NOTE — PROGRESS NOTES
Individual Psychotherapy (PhD/LCSW)    7/1/2019    Site: [facility]    Therapeutic Intervention: Met with patient alone.  Outpatient - Insight oriented psychotherapy 45 min - CPT code 77936    Chief complaint/reason for encounter: depression, distractibility     Interval history and content of current session: Pt had another fall in [place] just before her conference, was able to attend the conference but was not able to drive until this week, is doing PT. She is being seen by several doctors for various issues including spots on lung and liver. Overall she is doing very well, unpacking boxes, talking to and seeing friends, has a positive attitude.    Treatment plan:  · Target symptoms: depression, distractibility  · Why chosen therapy is appropriate versus another modality: relevant to diagnosis  · Outcome monitoring methods: self-report  · Therapeutic intervention type: insight oriented psychotherapy    Risk parameters:  Patient reports no suicidal ideation  Patient reports no homicidal ideation  Patient reports no self-injurious behavior  Patient reports no violent behavior    Verbal deficits: None    Patient's response to intervention:  The patient's response to intervention is motivated    Progress toward goals and other mental status changes:  The patient's progress toward goals is progressing slowly    Diagnosis: Major depression, recurrent, in partial remission,  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

## 2019-07-03 ENCOUNTER — OFFICE VISIT (OUTPATIENT)
Dept: ENDOCRINOLOGY | Facility: CLINIC | Age: 69
End: 2019-07-03
Payer: MEDICARE

## 2019-07-03 VITALS
DIASTOLIC BLOOD PRESSURE: 78 MMHG | WEIGHT: 158.31 LBS | SYSTOLIC BLOOD PRESSURE: 120 MMHG | HEART RATE: 70 BPM | BODY MASS INDEX: 25.44 KG/M2 | HEIGHT: 66 IN

## 2019-07-03 DIAGNOSIS — I10 ESSENTIAL HYPERTENSION: Chronic | ICD-10-CM

## 2019-07-03 DIAGNOSIS — N18.30 CKD (CHRONIC KIDNEY DISEASE) STAGE 3, GFR 30-59 ML/MIN: ICD-10-CM

## 2019-07-03 DIAGNOSIS — M85.80 OSTEOPENIA, UNSPECIFIED LOCATION: ICD-10-CM

## 2019-07-03 DIAGNOSIS — E21.0 PRIMARY HYPERPARATHYROIDISM: Primary | ICD-10-CM

## 2019-07-03 DIAGNOSIS — E21.3 HYPERPARATHYROIDISM: ICD-10-CM

## 2019-07-03 PROCEDURE — 3074F SYST BP LT 130 MM HG: CPT | Mod: CPTII,S$GLB,, | Performed by: INTERNAL MEDICINE

## 2019-07-03 PROCEDURE — 1101F PR PT FALLS ASSESS DOC 0-1 FALLS W/OUT INJ PAST YR: ICD-10-PCS | Mod: CPTII,S$GLB,, | Performed by: INTERNAL MEDICINE

## 2019-07-03 PROCEDURE — 3078F PR MOST RECENT DIASTOLIC BLOOD PRESSURE < 80 MM HG: ICD-10-PCS | Mod: CPTII,S$GLB,, | Performed by: INTERNAL MEDICINE

## 2019-07-03 PROCEDURE — 3074F PR MOST RECENT SYSTOLIC BLOOD PRESSURE < 130 MM HG: ICD-10-PCS | Mod: CPTII,S$GLB,, | Performed by: INTERNAL MEDICINE

## 2019-07-03 PROCEDURE — 1101F PT FALLS ASSESS-DOCD LE1/YR: CPT | Mod: CPTII,S$GLB,, | Performed by: INTERNAL MEDICINE

## 2019-07-03 PROCEDURE — 99204 OFFICE O/P NEW MOD 45 MIN: CPT | Mod: S$GLB,,, | Performed by: INTERNAL MEDICINE

## 2019-07-03 PROCEDURE — 99999 PR PBB SHADOW E&M-EST. PATIENT-LVL IV: CPT | Mod: PBBFAC,,, | Performed by: INTERNAL MEDICINE

## 2019-07-03 PROCEDURE — 99999 PR PBB SHADOW E&M-EST. PATIENT-LVL IV: ICD-10-PCS | Mod: PBBFAC,,, | Performed by: INTERNAL MEDICINE

## 2019-07-03 PROCEDURE — 3078F DIAST BP <80 MM HG: CPT | Mod: CPTII,S$GLB,, | Performed by: INTERNAL MEDICINE

## 2019-07-03 PROCEDURE — 99204 PR OFFICE/OUTPT VISIT, NEW, LEVL IV, 45-59 MIN: ICD-10-PCS | Mod: S$GLB,,, | Performed by: INTERNAL MEDICINE

## 2019-07-03 RX ORDER — FERROUS SULFATE 325(65) MG
325 TABLET ORAL
COMMUNITY
End: 2021-05-25

## 2019-07-03 NOTE — PROGRESS NOTES
Subjective:      Patient ID: Manisha Wynne is a 68 y.o. female.    Chief Complaint:  Hyperparathyroidism      History of Present Illness  With regards to the hypercalcemia or primary hyperparathyroidism:  Was found to have hypercalcemia on routine labs - first noted: 2019   Pth was checked:  2019 and was elevated     She denied current or past lithium use    She does have ckd     Parathyroid imagin19  Impression       Near complete washout from the thyroid gland without focal uptake to suggest parathyroid adenoma in the expected region of the parathyroid glands.    In the midline upper chest there is prominent uptake on early images with faint retention on delayed.  Finding is nonspecific and ectopic parathyroid tissue cannot be definitively excluded.  Further evaluation with 4D contrast enhanced CT chest may be warranted if there is further clinical concern.     CT neck 4D  Impression       1.  Midline mediastinal single lesion consistent with ectopic parathyroid adenoma.       Thyroid ultrasound:  None           Daily intake of calcium is:  Taking vitamin D: 5000 iu qd     Denies constipation, depression, polyuria     Last bmd was:  2017  Impression       Osteopenia, of spine and hip. FRAX calculation does not support treatment for osteoporosis. Total hip and lumbar spine BMD unchanged.        Denies kidney stones  prone to falls   Right patella fracture   2009 right wrist   2012 clavicle      Denies HCTZ use.    Adopted - no known FH         Review of Systems   Constitutional: Negative for unexpected weight change.   Eyes: Negative for visual disturbance.   Respiratory: Negative for shortness of breath.    Cardiovascular: Negative for chest pain.   Gastrointestinal: Negative for abdominal pain.   Musculoskeletal: Negative for arthralgias.   Skin: Negative for wound.   Neurological: Negative for headaches.   Hematological: Does not bruise/bleed easily.   Psychiatric/Behavioral: Negative  for sleep disturbance.       Objective:   Physical Exam   Constitutional: She appears well-developed.   HENT:   Right Ear: External ear normal.   Left Ear: External ear normal.   Nose: Nose normal.   Hearing normal  Dentition good    Neck: No tracheal deviation present. No thyromegaly present.   Cardiovascular: Normal rate.   No murmur heard.  Pulmonary/Chest: Effort normal and breath sounds normal.   Abdominal: Soft. There is no tenderness. No hernia.   Musculoskeletal: She exhibits no edema.   Gait normal  No clubbing or cyanosis noted   Neurological: She displays normal reflexes. No cranial nerve deficit.   Skin: No rash noted.   No nodules       Psychiatric: She has a normal mood and affect. Judgment normal.   Vitals reviewed.      Body mass index is 25.55 kg/m².    Lab Review:   Lab Results   Component Value Date    HGBA1C 5.4 06/21/2019     Lab Results   Component Value Date    CHOL 212 (H) 01/15/2019    HDL 72 01/15/2019    LDLCALC 124.6 01/15/2019    TRIG 77 01/15/2019    CHOLHDL 34.0 01/15/2019     Lab Results   Component Value Date     06/21/2019    K 4.3 06/21/2019     06/21/2019    CO2 27 06/21/2019    GLU 85 06/21/2019    BUN 29 (H) 06/21/2019    CREATININE 1.3 06/21/2019    CALCIUM 10.5 06/21/2019    PROT 7.6 06/21/2019    ALBUMIN 4.3 06/21/2019    BILITOT 0.8 06/21/2019    ALKPHOS 80 06/21/2019    AST 39 06/21/2019    ALT 52 (H) 06/21/2019    ANIONGAP 9 06/21/2019    ESTGFRAFRICA 48.0 (A) 06/21/2019    EGFRNONAA 41.7 (A) 06/21/2019    TSH 3.865 06/21/2019           Assessment and Plan     Primary hyperparathyroidism   Surgical indications are ckd    check 24 urine calcium   reCheck bone DXA 11/2019   Discussed indications for surgery  - handout provided  Refer to Dr Agee     Avoid dehydration, excessive calcium supplementation and meds (HCTZ)  that can worsen hypercalcemia.  Follow up after results reviewed        CKD (chronic kidney disease) stage 3, GFR 30-59 ml/min  As above        Osteopenia  As above     Reassuring not fracturing.   rda calcium and vit D  Follow over time  discussed indications for offering therapy        Hypertension  controlled  Avoid hctz

## 2019-07-03 NOTE — ASSESSMENT & PLAN NOTE
Surgical indications are ckd    check 24 urine calcium   reCheck bone DXA 11/2019   Discussed indications for surgery  - handout provided  Refer to Dr Agee     Avoid dehydration, excessive calcium supplementation and meds (HCTZ)  that can worsen hypercalcemia.  Follow up after results reviewed

## 2019-07-03 NOTE — ASSESSMENT & PLAN NOTE
As above     Reassuring not fracturing.   rda calcium and vit D  Follow over time  discussed indications for offering therapy

## 2019-07-03 NOTE — LETTER
July 3, 2019      Iris Blue MD  [address]  [facility] [address]           [facility] Endocrinology 6th [place]  [address]  [facility] [address]  Phone: [phone]          Patient: Manisha Wynne   MR Number: [id number]   YOB: 1950   Date of Visit: 7/3/2019       Dear Dr. Iris Blue:    Thank you for referring Manisha Wynne to me for evaluation. Attached you will find relevant portions of my assessment and plan of care.    If you have questions, please do not hesitate to call me. I look forward to following Manisha Wynne along with you.    Sincerely,    Cristine Villarreal MD    Enclosure  CC:  No Recipients    If you would like to receive this communication electronically, please contact [email or web address] or [phone] to request more information on [employer] access.    For providers and/or their staff who would like to refer a patient to [facility], please contact us through our one-stop-shop provider referral line, [facility], at [phone].    If you feel you have received this communication in error or would no longer like to receive these types of communications, please e-mail [email or web address]

## 2019-07-05 ENCOUNTER — ANESTHESIA (OUTPATIENT)
Dept: ENDOSCOPY | Facility: HOSPITAL | Age: 69
End: 2019-07-05
Payer: MEDICARE

## 2019-07-05 ENCOUNTER — ANESTHESIA EVENT (OUTPATIENT)
Dept: ENDOSCOPY | Facility: HOSPITAL | Age: 69
End: 2019-07-05
Payer: MEDICARE

## 2019-07-05 ENCOUNTER — HOSPITAL ENCOUNTER (OUTPATIENT)
Facility: HOSPITAL | Age: 69
Discharge: HOME | End: 2019-07-05
Attending: INTERNAL MEDICINE | Admitting: INTERNAL MEDICINE
Payer: MEDICARE

## 2019-07-05 VITALS
OXYGEN SATURATION: 100 % | BODY MASS INDEX: 25.51 KG/M2 | WEIGHT: 158.75 LBS | SYSTOLIC BLOOD PRESSURE: 125 MMHG | TEMPERATURE: 98 F | DIASTOLIC BLOOD PRESSURE: 59 MMHG | HEART RATE: 50 BPM | HEIGHT: 66 IN | RESPIRATION RATE: 16 BRPM

## 2019-07-05 DIAGNOSIS — K86.89 ATROPHIC PANCREAS: Primary | ICD-10-CM

## 2019-07-05 PROCEDURE — D9220A PRA ANESTHESIA: ICD-10-PCS | Mod: ANES,,, | Performed by: ANESTHESIOLOGY

## 2019-07-05 PROCEDURE — 43259 EGD US EXAM DUODENUM/JEJUNUM: CPT | Mod: ,,, | Performed by: INTERNAL MEDICINE

## 2019-07-05 PROCEDURE — 63600175 PHARM REV CODE 636 W HCPCS: Performed by: NURSE ANESTHETIST, CERTIFIED REGISTERED

## 2019-07-05 PROCEDURE — 37000009 HC ANESTHESIA EA ADD 15 MINS: Performed by: INTERNAL MEDICINE

## 2019-07-05 PROCEDURE — D9220A PRA ANESTHESIA: Mod: ANES,,, | Performed by: ANESTHESIOLOGY

## 2019-07-05 PROCEDURE — 43259 PR ENDOSCOPIC ULTRASOUND EXAM: ICD-10-PCS | Mod: ,,, | Performed by: INTERNAL MEDICINE

## 2019-07-05 PROCEDURE — 37000008 HC ANESTHESIA 1ST 15 MINUTES: Performed by: INTERNAL MEDICINE

## 2019-07-05 PROCEDURE — 25000003 PHARM REV CODE 250: Performed by: INTERNAL MEDICINE

## 2019-07-05 PROCEDURE — D9220A PRA ANESTHESIA: Mod: CRNA,,, | Performed by: NURSE ANESTHETIST, CERTIFIED REGISTERED

## 2019-07-05 PROCEDURE — 43259 EGD US EXAM DUODENUM/JEJUNUM: CPT | Performed by: INTERNAL MEDICINE

## 2019-07-05 PROCEDURE — D9220A PRA ANESTHESIA: ICD-10-PCS | Mod: CRNA,,, | Performed by: NURSE ANESTHETIST, CERTIFIED REGISTERED

## 2019-07-05 RX ORDER — MIDAZOLAM HYDROCHLORIDE 1 MG/ML
INJECTION, SOLUTION INTRAMUSCULAR; INTRAVENOUS
Status: DISCONTINUED | OUTPATIENT
Start: 2019-07-05 | End: 2019-07-05

## 2019-07-05 RX ORDER — LIDOCAINE HCL/PF 100 MG/5ML
SYRINGE (ML) INTRAVENOUS
Status: DISCONTINUED | OUTPATIENT
Start: 2019-07-05 | End: 2019-07-05

## 2019-07-05 RX ORDER — FENTANYL CITRATE 50 UG/ML
25 INJECTION, SOLUTION INTRAMUSCULAR; INTRAVENOUS EVERY 5 MIN PRN
Status: DISCONTINUED | OUTPATIENT
Start: 2019-07-05 | End: 2019-07-05 | Stop reason: HOSPADM

## 2019-07-05 RX ORDER — ONDANSETRON 2 MG/ML
4 INJECTION INTRAMUSCULAR; INTRAVENOUS DAILY PRN
Status: DISCONTINUED | OUTPATIENT
Start: 2019-07-05 | End: 2019-07-05 | Stop reason: HOSPADM

## 2019-07-05 RX ORDER — METOCLOPRAMIDE HYDROCHLORIDE 5 MG/ML
10 INJECTION INTRAMUSCULAR; INTRAVENOUS EVERY 10 MIN PRN
Status: DISCONTINUED | OUTPATIENT
Start: 2019-07-05 | End: 2019-07-05 | Stop reason: HOSPADM

## 2019-07-05 RX ORDER — PROPOFOL 10 MG/ML
VIAL (ML) INTRAVENOUS CONTINUOUS PRN
Status: DISCONTINUED | OUTPATIENT
Start: 2019-07-05 | End: 2019-07-05

## 2019-07-05 RX ORDER — SODIUM CHLORIDE 0.9 % (FLUSH) 0.9 %
10 SYRINGE (ML) INJECTION
Status: DISCONTINUED | OUTPATIENT
Start: 2019-07-05 | End: 2019-07-05 | Stop reason: HOSPADM

## 2019-07-05 RX ORDER — OXYCODONE AND ACETAMINOPHEN 5; 325 MG/1; MG/1
1 TABLET ORAL
Status: DISCONTINUED | OUTPATIENT
Start: 2019-07-05 | End: 2019-07-05 | Stop reason: HOSPADM

## 2019-07-05 RX ORDER — PROPOFOL 10 MG/ML
VIAL (ML) INTRAVENOUS
Status: DISCONTINUED | OUTPATIENT
Start: 2019-07-05 | End: 2019-07-05

## 2019-07-05 RX ORDER — SODIUM CHLORIDE 9 MG/ML
INJECTION, SOLUTION INTRAVENOUS CONTINUOUS
Status: DISCONTINUED | OUTPATIENT
Start: 2019-07-05 | End: 2019-07-05 | Stop reason: HOSPADM

## 2019-07-05 RX ADMIN — PROPOFOL 150 MCG/KG/MIN: 10 INJECTION, EMULSION INTRAVENOUS at 07:07

## 2019-07-05 RX ADMIN — MIDAZOLAM HYDROCHLORIDE 2 MG: 1 INJECTION, SOLUTION INTRAMUSCULAR; INTRAVENOUS at 07:07

## 2019-07-05 RX ADMIN — SODIUM CHLORIDE: 0.9 INJECTION, SOLUTION INTRAVENOUS at 07:07

## 2019-07-05 RX ADMIN — LIDOCAINE HYDROCHLORIDE 60 MG: 20 INJECTION, SOLUTION INTRAVENOUS at 07:07

## 2019-07-05 RX ADMIN — PROPOFOL 80 MG: 10 INJECTION, EMULSION INTRAVENOUS at 07:07

## 2019-07-05 NOTE — DISCHARGE INSTRUCTIONS
Endoscopic Ultrasound (EUS)    An endoscopic ultrasound (EUS) is a test to look at the inside of your gastrointestinal (GI) tract. It's commonly used to look for cancers or growths in the esophagus, stomach, pancreas, liver, and rectum. It can help to stage cancer (see how advanced a cancer is). EUS may also be used to help diagnose certain diseases or to drain cysts or abscesses.  What is EUS?  EUS shows both ultrasound images and live video of the GI tract. During the test, a flexible tube called an endoscope (scope) is used. At the end of the scope is a tiny video camera and light. The video camera sends live images to a monitor. The scope also contains a very small ultrasound device. This uses sound waves to create images and send them to a monitor.  A needle is passed through the scope. The needle can be used take a small sample of tissue for testing. This is called a biopsy. The needle can be used to take a sample of fluid. This is called fine-needle aspiration (FNA).  Risks and possible complications of EUS  Risks and possible complications include the following:  · Bleeding  · Infection  · A perforation (hole) in the digestive tract   · Risks of sedation or anesthesia   Before the test  Be prepared prior to the test:  · Tell your healthcare provider what medicine you take. This includes vitamins, herbs, and over-the-counter medicine. It also includes any blood thinners, such as warfarin, clopidogrel, ibuprofen, or daily aspirin. Ask your healthcare provider if you need to stop taking some or all of them before the test.  · You may be prescribed antibiotics to take before or after the test. This depends on the area being studied and what is done during the test. These medicines help prevent infection.  · Carefully follow the instructions for preparing for the test to make sure results are accurate. Instructions may include:  ¨ If youre having an EUS of the upper GI tract (esophagus, stomach, duodenum,  pancreas, liver):  § Do not eat or drink for 6 hours before the test.  ¨ If youre having an EUS of the lower GI tract (rectum):  § Before the test, do bowel prep as instructed to clean your rectum of stool. This may involve a clear liquid diet and using a laxative (liquid or pills) the night before the test. Or it may mean doing one or more enemas the morning of the test.  § Do not eat or drink for 6 hours before the test.  · Be sure to arrive on time at the facility. Bring your identification and health insurance card. Leave valuables at home. If you have them, bring X-rays or other test results with you.  Let the healthcare provider know  For your safety, tell the healthcare provider if you:  · Take insulin. Your dose may need to be changed on the day of your test.  · Are allergic to latex.  · Have any other allergies.  · Are taking blood thinners.   During the test  An endoscopic ultrasound usually takes place in a hospital. The procedure itself may take 1 to 2 hours. You will likely go home soon afterward. During the test:  · You lie on your left side on an exam table.  · An intravenous (IV) line will be put into a vein in your arm or hand. This line supplies fluids and medicines. To keep you comfortable during the test, you will be given a sedative medicine. This medicine prevents discomfort and will make you sleepy.  · If you are having an EUS of the upper GI tract, local anesthetic may be sprayed in your throat. This will help you be more comfortable as the healthcare provider inserts the scope. The healthcare provider then gently puts the flexible scope into your mouth or nose and down your throat.  · If youre having an EUS of the lower GI tract, the healthcare provider gently puts the flexible scope into your anus.  · During the test, the scope sends live video and ultrasound images from inside your body to nearby monitors. These are used to examine your GI tract. Specialized procedures, such as drainage,  are done as needed.  · The healthcare provider may discuss the results with you soon after the test. Biopsy results take several  days.  · In most cases, you can go home within a few hours of the test. When you leave the facility, have an adult family member or friend drive you, even if you don't feel that sleepy.  After the test  Here is what to expect after the test:  · You may feel tired from the sedative. This should wear off by the end of the day.  · If you had an upper digestive endoscopy, your throat may feel sore for a day or two. Over-the-counter sore throat lozenges and spray should help.  · You can eat and drink normally as soon as the test is done.  When to call the healthcare provider  Call your healthcare provider if you notice any of the following:  · Fever of 100.4°F (38.0°C) or higher, or as advised by your healthcare provider  · Shortness of breath  · Vomiting blood, blood in stool, or black stools  · Coughing or hoarse voice that wont go away   Date Last Reviewed: 7/1/2016  © 4447-5619 [employer]. [address] [place] [address]. All rights reserved. This information is not intended as a substitute for professional medical care. Always follow your healthcare professional's instructions.

## 2019-07-05 NOTE — [employer] PATIENT INSTRUCTIONS
Discharge Summary/Instructions after an Endoscopic Procedure  Patient Name: Manisha Wynne  Patient MRN: [id number]  Patient YOB: 1950 Friday, July 05, 2019  Kev Calderon MD  RESTRICTIONS:  During your procedure today, you received medications for sedation.  These   medications may affect your judgment, balance and coordination.  Therefore,   for 24 hours, you have the following restrictions:   - DO NOT drive a car, operate machinery, make legal/financial decisions,   sign important papers or drink alcohol.    ACTIVITY:  Today: no heavy lifting, straining or running due to procedural   sedation/anesthesia.  The following day: return to full activity including work.  DIET:  Eat and drink normally unless instructed otherwise.     TREATMENT FOR COMMON SIDE EFFECTS:  - Mild abdominal pain, nausea, belching, bloating or excessive gas:  rest,   eat lightly and use a heating pad.  - Sore Throat: treat with throat lozenges and/or gargle with warm salt   water.  - Because air was used during the procedure, expelling large amounts of air   from your rectum or belching is normal.  - If a bowel prep was taken, you may not have a bowel movement for 1-3 days.    This is normal.  SYMPTOMS TO WATCH FOR AND REPORT TO YOUR PHYSICIAN:  1. Abdominal pain or bloating, other than gas cramps.  2. Chest pain.  3. Back pain.  4. Signs of infection such as: chills or fever occurring within 24 hours   after the procedure.  5. Rectal bleeding, which would show as bright red, maroon, or black stools.   (A tablespoon of blood from the rectum is not serious, especially if   hemorrhoids are present.)  6. Vomiting.  7. Weakness or dizziness.  GO DIRECTLY TO THE NEAREST EMERGENCY ROOM IF YOU HAVE ANY OF THE FOLLOWING:      Difficulty breathing              Chills and/or fever over 101 F   Persistent vomiting and/or vomiting blood   Severe abdominal pain   Severe chest pain   Black, tarry stools   Bleeding- more than one  tablespoon   Any other symptom or condition that you feel may need urgent attention  Your doctor recommends these additional instructions:  If any biopsies were taken, your doctors clinic will contact you in 1 to 2   weeks with any results.  - Discharge patient to home.   - Resume previous diet.   - Continue present medications.   - Return to referring physician at appointment to be scheduled.   - ACR guideline would suggest to perform magnetic resonance imaging (MRI)   with gadolinium in 2 years to monitor cyst  For questions, problems or results please call your physician - Kev Calderon MD at Work:  [phone].  [facility] [place], EMERGENCY ROOM PHONE NUMBER: [phone]  IF A COMPLICATION OR EMERGENCY SITUATION ARISES AND YOU ARE UNABLE TO REACH   YOUR PHYSICIAN - GO DIRECTLY TO THE EMERGENCY ROOM.  Kev Calderon MD  7/5/2019 8:14:26 AM  This report has been verified and signed electronically.  [employer]

## 2019-07-05 NOTE — ANESTHESIA PREPROCEDURE EVALUATION
07/05/2019  Manisha Wynne is a 68 y.o., female with a pre-operative diagnosis of Abnormal finding on imaging [R93.89] who is scheduled for Procedure(s) (LRB):  ULTRASOUND, UPPER GI TRACT, ENDOSCOPIC (N/A).     Requested anesthesia type: General  Surgeon: Kev Calderon MD  Allergies: Review of patient's allergies indicates:  No Known Allergies  Vital Sign Range: Temp:  [36.6 °C (97.9 °F)] 36.6 °C (97.9 °F)  Pulse:  [67] 67  Resp:  [17] 17  SpO2:  [99 %] 99 %  BP: (134)/(66) 134/66  Chronic Medications:   Medications Prior to Admission   Medication Sig Dispense Refill Last Dose    aspirin (ECOTRIN) 81 MG EC tablet Take 81 mg by mouth. 1 Tablet, Delayed Release (E.C.) Oral Every day   7/4/2019 at Unknown time    buPROPion (WELLBUTRIN XL) 300 MG 24 hr tablet Take 300 mg by mouth once daily.    7/5/2019 at Unknown time    calcium-vitamin D 600 mg(1,500mg) -200 unit Tab Take 1 tablet by mouth once daily.    7/4/2019 at Unknown time    cholecalciferol, vitamin D3, (VITAMIN D3) 5,000 unit Tab Take 5,000 Units by mouth once daily.   7/4/2019 at Unknown time    ferrous sulfate (FEOSOL) 325 mg (65 mg iron) Tab tablet Take 325 mg by mouth daily with breakfast. Taking two times daily   7/5/2019 at Unknown time    FLUoxetine (PROZAC) 20 MG capsule Take 20 mg by mouth once daily.   7/5/2019 at Unknown time    lisdexamfetamine (VYVANSE) 70 MG capsule Take 70 mg by mouth every morning.   7/5/2019 at Unknown time    losartan (COZAAR) 25 MG tablet TAKE 1 TABLET(25 MG) BY MOUTH EVERY DAY (Patient taking differently: Take 12.5 mg by mouth once daily. TAKE 1/2 TABLET(12.5 MG) BY MOUTH EVERY DAY) 90 tablet 3 7/5/2019 at Unknown time    metoprolol tartrate (LOPRESSOR) 25 MG tablet Take 1 tablet (25 mg total) by mouth 2 (two) times daily. 180 tablet 3 7/5/2019 at Unknown time    multivitamin-minerals-lutein  (CENTRUM SILVER) Tab Take by mouth. 1 Tablet Oral Every day   7/5/2019 at Unknown time    pravastatin (PRAVACHOL) 10 MG tablet Take 1 tablet (10 mg total) by mouth once daily. 90 tablet 3 7/4/2019 at Unknown time     Current Medications:   Current Facility-Administered Medications   Medication Dose Route Frequency Provider Last Rate Last Dose    0.9%  NaCl infusion   Intravenous Continuous Kev Calderon MD 20 mL/hr at 07/05/19 0731      sodium chloride 0.9% flush 10 mL  10 mL Intravenous PRN Kev Calderon MD         Medical History:   Past Medical History:   Diagnosis Date    Abnormal Pap smear     Atrial fibrillation     AV block, 1st degree 7/25/2012    Cataract     Depression 7/24/2012    Facet arthritis of lumbar region 3/31/2015    Falls     3 falls in the last 6 mos--noted 6/19/19    Hyperlipidemia     Hypertension 7/24/2012    Other specified cardiac dysrhythmias(427.89)     Syncope 7/24/2012     .  Anesthesia History       Syncope Depression    Hypertension AV block, 1st degree    Other specified cardiac dysrhythmias(427.89) Hyperlipidemia    Atrial fibrillation Abnormal Pap smear    Facet arthritis of lumbar region      Patient Active Problem List   Diagnosis    Depression    Hypertension    SSS (sick sinus syndrome)    AV block, 1st degree    Hyperlipidemia    Cardiac pacemaker in situ    Abnormal Pap smear of cervix    Lumbar spinal stenosis    Lumbar radiculopathy    Facet arthritis of lumbar region    Sacroiliac joint dysfunction    CEDRIC (obstructive sleep apnea)    RBBB    CKD (chronic kidney disease) stage 3, GFR 30-59 ml/min    Primary hyperparathyroidism    Osteopenia    Atrophic pancreas     Past Surgical History:   Procedure Laterality Date    CARDIAC PACEMAKER PLACEMENT  09/07/2012    [id number]    COLONOSCOPY N/A 8/7/2015    Performed [facility] at [facility] (4TH [facility]    DILATION AND CURETTAGE OF UTERUS      Hx of precancerous  cells?    INJECTION-STEROID-EPIDURAL-TRANSFORAMINAL Left 6/25/2015    [facility] at [facility]    TONSILLECTOMY       Pre Vitals       BP: 117/72 Pulse: 75 Resp: 18   SpO2: 97 Temp: 36.9 °C (98.4 °F)                   Pre-op Assessment    I have reviewed the Patient Summary Reports.     I have reviewed the Nursing Notes.   I have reviewed the Medications.     Review of Systems  Anesthesia Hx:  No problems with previous Anesthesia    Cardiovascular:   Hypertension, well controlled Dysrhythmias    Pulmonary:   Sleep Apnea    Renal/:   Chronic Renal Disease    Neurological:   Neuromuscular Disease,    Psych:   Psychiatric History      No results for input(s): WBC, RBC, HGB, HCT, PLT, MCV, MCH, MCHC in the last 24 hours.    Chemistry        Component Value Date/Time     06/21/2019 1117    K 4.3 06/21/2019 1117     06/21/2019 1117    CO2 27 06/21/2019 1117    BUN 29 (H) 06/21/2019 1117    CREATININE 1.3 06/21/2019 1117    GLU 85 06/21/2019 1117        Component Value Date/Time    CALCIUM 10.5 06/21/2019 1117    ALKPHOS 80 06/21/2019 1117    AST 39 06/21/2019 1117    ALT 52 (H) 06/21/2019 1117    BILITOT 0.8 06/21/2019 1117            Physical Exam  General:  Malnutrition    Airway/Jaw/Neck:  Airway Findings: Mouth Opening: Normal Tongue: Normal  General Airway Assessment: Adult, Good  Mallampati: II  Jaw/Neck Findings:     Neck ROM: Normal ROM     Eyes/Ears/Nose:  Eyes/Ears/Nose Findings:    Dental:  Dental Findings: In tact, Periodontal disease, Mild   Chest/Lungs:  Chest/Lungs Findings: Clear to auscultation, Normal Respiratory Rate     Heart/Vascular:  Heart Findings: Rate: Normal  Rhythm: Irregularly Irregular  Sounds: Normal  Heart murmur: negative Vascular Findings: Normal    Abdomen:  Abdomen Findings: Normal    Musculoskeletal:  Musculoskeletal Findings: Normal   Skin:  Skin Findings: Normal    Mental Status:  Mental Status Findings:         Anesthesia Plan  Type of  Anesthesia, risks & benefits discussed:  Anesthesia Type:  general  Patient's Preference: as indicated  Intra-op Monitoring Plan: standard ASA monitors  Intra-op Monitoring Plan Comments:   Post Op Pain Control Plan:   Post Op Pain Control Plan Comments:   Induction:   IV  Beta Blocker:  Patient is on a Beta-Blocker and has received one dose within the past 24 hours (No further documentation required).       Informed Consent: Patient understands risks and agrees with Anesthesia plan.  Questions answered. Anesthesia consent signed with patient.  ASA Score: 3     Day of Surgery Review of History & Physical:  There are no significant changes.  H&P update referred to the surgeon.     Anesthesia Plan Notes: Risks of dental and eye injury reviewed with patient, agrees to proceed. Reassurance given.        Ready For Surgery From Anesthesia Perspective.

## 2019-07-05 NOTE — TRANSFER OF CARE
"Anesthesia Transfer of Care Note    Patient: Manisha Wynne    Procedure(s) Performed: Procedure(s) (LRB):  ULTRASOUND, UPPER GI TRACT, ENDOSCOPIC (N/A)    Patient location: GI    Anesthesia Type: general    Transport from OR: Transported from OR on room air with adequate spontaneous ventilation    Post pain: adequate analgesia    Post assessment: no apparent anesthetic complications and tolerated procedure well    Post vital signs: stable    Level of consciousness: awake, alert and oriented    Nausea/Vomiting: no nausea/vomiting    Complications: none    Transfer of care protocol was followed      Last vitals:   Visit Vitals  /66   Pulse 67   Temp 36.6 °C (97.9 °F)   Resp 17   Ht 5' 6" (1.676 m)   Wt 72 kg (158 lb 11.7 oz)   LMP 10/01/2003   SpO2 99%   Breastfeeding? No   BMI 25.62 kg/m²     "

## 2019-07-05 NOTE — H&P
Short Stay Endoscopy History and Physical    PCP - Iris Blue MD  Referring Physician - Iris Blue MD  [address] [place] [address]    Procedure - eus  ASA - per anesthesia  Mallampati - per anesthesia  History of Anesthesia problems - no  Family history Anesthesia problems -  no   Plan of anesthesia - General    HPI:  This is a 68 y.o. female here for evaluation of: pancreas atrophy    Reflux - no  Dysphagia - no  Abdominal pain - no  Diarrhea - no    ROS:  Constitutional: No fevers, chills, No weight loss  CV: No chest pain  Pulm: No cough, No shortness of breath  Ophtho: No vision changes  GI: see HPI  Derm: No rash    Medical History:  has a past medical history of Abnormal Pap smear, Atrial fibrillation, AV block, 1st degree (7/25/2012), Cataract, Depression (7/24/2012), Facet arthritis of lumbar region (3/31/2015), Falls, Hyperlipidemia, Hypertension (7/24/2012), Other specified cardiac dysrhythmias(427.89), and Syncope (7/24/2012).    Surgical History:  has a past surgical history that includes Tonsillectomy; Dilation and curettage of uterus; and Cardiac pacemaker placement (09/07/2012).    Family History: family history is not on file. She was adopted..    Social History:  reports that she quit smoking about 36 years ago. She has a 3.75 pack-year smoking history. She has never used smokeless tobacco. She reports that she drinks alcohol. She reports that she does not use drugs.    Review of patient's allergies indicates:  No Known Allergies    Medications:   Medications Prior to Admission   Medication Sig Dispense Refill Last Dose    aspirin (ECOTRIN) 81 MG EC tablet Take 81 mg by mouth. 1 Tablet, Delayed Release (E.C.) Oral Every day   7/4/2019 at Unknown time    buPROPion (WELLBUTRIN XL) 300 MG 24 hr tablet Take 300 mg by mouth once daily.    7/5/2019 at Unknown time    calcium-vitamin D 600 mg(1,500mg) -200 unit Tab Take 1 tablet by mouth once daily.    7/4/2019 at  Unknown time    cholecalciferol, vitamin D3, (VITAMIN D3) 5,000 unit Tab Take 5,000 Units by mouth once daily.   7/4/2019 at Unknown time    ferrous sulfate (FEOSOL) 325 mg (65 mg iron) Tab tablet Take 325 mg by mouth daily with breakfast. Taking two times daily   7/5/2019 at Unknown time    FLUoxetine (PROZAC) 20 MG capsule Take 20 mg by mouth once daily.   7/5/2019 at Unknown time    lisdexamfetamine (VYVANSE) 70 MG capsule Take 70 mg by mouth every morning.   7/5/2019 at Unknown time    losartan (COZAAR) 25 MG tablet TAKE 1 TABLET(25 MG) BY MOUTH EVERY DAY (Patient taking differently: Take 12.5 mg by mouth once daily. TAKE 1/2 TABLET(12.5 MG) BY MOUTH EVERY DAY) 90 tablet 3 7/5/2019 at Unknown time    metoprolol tartrate (LOPRESSOR) 25 MG tablet Take 1 tablet (25 mg total) by mouth 2 (two) times daily. 180 tablet 3 7/5/2019 at Unknown time    multivitamin-minerals-lutein (CENTRUM SILVER) Tab Take by mouth. 1 Tablet Oral Every day   7/5/2019 at Unknown time    pravastatin (PRAVACHOL) 10 MG tablet Take 1 tablet (10 mg total) by mouth once daily. 90 tablet 3 7/4/2019 at Unknown time       Physical Exam:    Vital Signs:   Vitals:    07/05/19 0729   BP: 134/66   Pulse: 67   Resp: 17   Temp: 97.9 °F (36.6 °C)       General Appearance: Well appearing in no acute distress    Labs:  Lab Results   Component Value Date    WBC 7.02 06/21/2019    HGB 11.3 (L) 06/21/2019    HCT 34.5 (L) 06/21/2019     06/21/2019    CHOL 212 (H) 01/15/2019    TRIG 77 01/15/2019    HDL 72 01/15/2019    ALT 52 (H) 06/21/2019    AST 39 06/21/2019     06/21/2019    K 4.3 06/21/2019     06/21/2019    CREATININE 1.3 06/21/2019    BUN 29 (H) 06/21/2019    CO2 27 06/21/2019    TSH 3.865 06/21/2019    INR 1.1 09/06/2012    HGBA1C 5.4 06/21/2019       I have explained the risks and benefits of this endoscopic procedure to the patient including but not limited to bleeding, inflammation, infection, perforation, and  death.      Kev Calderon MD

## 2019-07-05 NOTE — ANESTHESIA POSTPROCEDURE EVALUATION
Anesthesia Post Evaluation    Patient: Manisha Wynne    Procedure(s) Performed: Procedure(s) (LRB):  ULTRASOUND, UPPER GI TRACT, ENDOSCOPIC (N/A)    Final Anesthesia Type: general  Patient location during evaluation: [facility]  Patient participation: Yes- Able to Participate  Level of consciousness: awake and alert and oriented  Post-procedure vital signs: reviewed and stable  Pain management: adequate  Airway patency: patent  PONV status at discharge: No PONV  Anesthetic complications: no      Cardiovascular status: stable  Respiratory status: unassisted, spontaneous ventilation and room air  Hydration status: euvolemic  Follow-up not needed.          Vitals Value Taken Time   /59 7/5/2019  8:49 AM   Temp 36.6 °C (97.9 °F) 7/5/2019  8:14 AM   Pulse 50 7/5/2019  8:49 AM   Resp 16 7/5/2019  8:49 AM   SpO2 100 % 7/5/2019  8:49 AM         No case tracking events are documented in the log.      Pain/Corie Score: Corie Score: 10 (7/5/2019  8:38 AM)

## 2019-07-05 NOTE — ANESTHESIA RELEASE NOTE
"Anesthesia Release from PACU Note    Patient: Manisha Wynne    Procedure(s) Performed: Procedure(s) (LRB):  ULTRASOUND, UPPER GI TRACT, ENDOSCOPIC (N/A)    Anesthesia type: GEN    Post pain: Adequate analgesia reported    Post assessment: no apparent anesthetic complications, tolerated procedure well and no evidence of recall    Post vital signs: BP (!) 125/59   Pulse (!) 50   Temp 36.6 °C (97.9 °F) (Temporal)   Resp 16   Ht 5' 6" (1.676 m)   Wt 72 kg (158 lb 11.7 oz)   LMP 10/01/2003   SpO2 100%   Breastfeeding? No   BMI 25.62 kg/m²     Level of consciousness: awake, alert and oriented    Nausea/Vomiting: no nausea/no vomiting    Complications: none    Airway Patency: patent    Respiratory: unassisted, spontaneous ventilation, room air    Cardiovascular: stable and blood pressure at baseline    Hydration: euvolemic    "

## 2019-07-08 DIAGNOSIS — I49.5 SSS (SICK SINUS SYNDROME): Primary | ICD-10-CM

## 2019-07-22 ENCOUNTER — TELEPHONE (OUTPATIENT)
Dept: ELECTROPHYSIOLOGY | Facility: CLINIC | Age: 69
End: 2019-07-22

## 2019-07-22 DIAGNOSIS — Z95.0 CARDIAC PACEMAKER IN SITU: Primary | ICD-10-CM

## 2019-07-22 DIAGNOSIS — I49.5 SSS (SICK SINUS SYNDROME): ICD-10-CM

## 2019-07-22 NOTE — TELEPHONE ENCOUNTER
7/22/2019    Spoke to pt and rescheduled to another day.   only see's  2 of   Patient daily.  Pt understood. And stated she fine with the new Date and time.    Ольга Miller (rita).                      ----- Message from Lucy Colindres MA sent at 7/22/2019  1:13 PM CDT -----  Contact: self  Pt.is asking to speak to Khadra. She is returning your call from  Fri.7/19 Please call

## 2019-07-23 ENCOUNTER — OFFICE VISIT (OUTPATIENT)
Dept: PSYCHIATRY | Facility: CLINIC | Age: 69
End: 2019-07-23
Payer: MEDICARE

## 2019-07-23 DIAGNOSIS — F98.8 ATTENTION DEFICIT DISORDER (ADD) IN ADULT: ICD-10-CM

## 2019-07-23 DIAGNOSIS — F33.41 MAJOR DEPRESSIVE DISORDER, RECURRENT EPISODE, IN PARTIAL REMISSION: Primary | ICD-10-CM

## 2019-07-23 PROCEDURE — 99999 PR PBB SHADOW E&M-EST. PATIENT-LVL I: CPT | Mod: PBBFAC,,, | Performed by: SOCIAL WORKER

## 2019-07-23 PROCEDURE — 90834 PSYTX W PT 45 MINUTES: CPT | Mod: S$GLB,,, | Performed by: SOCIAL WORKER

## 2019-07-23 PROCEDURE — 90834 PR PSYCHOTHERAPY W/PATIENT, 45 MIN: ICD-10-PCS | Mod: S$GLB,,, | Performed by: SOCIAL WORKER

## 2019-07-23 PROCEDURE — 99999 PR PBB SHADOW E&M-EST. PATIENT-LVL I: ICD-10-PCS | Mod: PBBFAC,,, | Performed by: SOCIAL WORKER

## 2019-07-23 NOTE — PROGRESS NOTES
Individual Psychotherapy (PhD/LCSW)    7/23/2019    Site: [facility]    Therapeutic Intervention: Met with patient alone.  Outpatient - Insight oriented psychotherapy 45 min - CPT code 64513    Chief complaint/reason for encounter: depression, distractibility     Interval history and content of current session: Pt progressing, making her bed daily, finding space to put things away, working longer hours, fininshing organizing project at work, making doctors' appointments to address health needs, getting car repaired. Pt has difficulty taking credit for her increased energy and organization. She will start giving herself recognition. She will also start using sleep-timer on her TV to keep herself from staying up late by default watching TV. Pt can't use her C-PAP due to problem with her nose, will schedule visit with surgeon to correct this.    Treatment plan:  · Target symptoms: depression, distractibility  · Why chosen therapy is appropriate versus another modality: relevant to diagnosis  · Outcome monitoring methods: self-report  · Therapeutic intervention type: insight oriented psychotherapy    Risk parameters:  Patient reports no suicidal ideation  Patient reports no homicidal ideation  Patient reports no self-injurious behavior  Patient reports no violent behavior    Verbal deficits: None    Patient's response to intervention:  The patient's response to intervention is motivated    Progress toward goals and other mental status changes:  The patient's progress toward goals is progressing well    Diagnosis: Major depression, recurrent, in partial remission,  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

## 2019-07-24 ENCOUNTER — PROCEDURE VISIT (OUTPATIENT)
Dept: NEUROLOGY | Facility: CLINIC | Age: 69
End: 2019-07-24
Payer: MEDICARE

## 2019-07-24 DIAGNOSIS — R26.9 NEUROLOGIC GAIT DISORDER: ICD-10-CM

## 2019-07-24 PROCEDURE — 95908 NRV CNDJ TST 3-4 STUDIES: CPT | Mod: S$GLB,,, | Performed by: NEUROMUSCULOSKELETAL MEDICINE & OMM

## 2019-07-24 PROCEDURE — 95886 MUSC TEST DONE W/N TEST COMP: CPT | Mod: S$GLB,,, | Performed by: NEUROMUSCULOSKELETAL MEDICINE & OMM

## 2019-07-24 PROCEDURE — 95908 PR NERVE CONDUCTION STUDY; 3-4 STUDIES: ICD-10-PCS | Mod: S$GLB,,, | Performed by: NEUROMUSCULOSKELETAL MEDICINE & OMM

## 2019-07-24 PROCEDURE — 95886 PR EMG COMPLETE, W/ NERVE CONDUCTION STUDIES, 5+ MUSCLES: ICD-10-PCS | Mod: S$GLB,,, | Performed by: NEUROMUSCULOSKELETAL MEDICINE & OMM

## 2019-07-31 ENCOUNTER — PATIENT MESSAGE (OUTPATIENT)
Dept: INTERNAL MEDICINE | Facility: CLINIC | Age: 69
End: 2019-07-31

## 2019-07-31 NOTE — TELEPHONE ENCOUNTER
Left a message for the patient to call the office back.  To schedule CT appointment      Please Advise  Thank You

## 2019-08-01 ENCOUNTER — OFFICE VISIT (OUTPATIENT)
Dept: ORTHOPEDICS | Facility: CLINIC | Age: 69
End: 2019-08-01
Payer: MEDICARE

## 2019-08-01 ENCOUNTER — HOSPITAL ENCOUNTER (OUTPATIENT)
Dept: RADIOLOGY | Facility: HOSPITAL | Age: 69
Discharge: HOME | End: 2019-08-01
Attending: NURSE PRACTITIONER
Payer: MEDICARE

## 2019-08-01 ENCOUNTER — HOSPITAL ENCOUNTER (OUTPATIENT)
Dept: RADIOLOGY | Facility: HOSPITAL | Age: 69
Discharge: HOME | End: 2019-08-01
Attending: INTERNAL MEDICINE
Payer: MEDICARE

## 2019-08-01 VITALS
TEMPERATURE: 98 F | BODY MASS INDEX: 25.69 KG/M2 | WEIGHT: 159.19 LBS | SYSTOLIC BLOOD PRESSURE: 102 MMHG | DIASTOLIC BLOOD PRESSURE: 70 MMHG | HEART RATE: 65 BPM

## 2019-08-01 DIAGNOSIS — R52 PAIN: ICD-10-CM

## 2019-08-01 DIAGNOSIS — M25.561 RIGHT KNEE PAIN, UNSPECIFIED CHRONICITY: Primary | ICD-10-CM

## 2019-08-01 PROCEDURE — 71250 CT CHEST WITHOUT CONTRAST: ICD-10-PCS | Mod: 26,,, | Performed by: RADIOLOGY

## 2019-08-01 PROCEDURE — 99999 PR PBB SHADOW E&M-EST. PATIENT-LVL IV: ICD-10-PCS | Mod: PBBFAC,,, | Performed by: NURSE PRACTITIONER

## 2019-08-01 PROCEDURE — 71250 CT THORAX DX C-: CPT | Mod: 26,,, | Performed by: RADIOLOGY

## 2019-08-01 PROCEDURE — 73562 X-RAY EXAM OF KNEE 3: CPT | Mod: TC,RT

## 2019-08-01 PROCEDURE — 99999 PR PBB SHADOW E&M-EST. PATIENT-LVL IV: CPT | Mod: PBBFAC,,, | Performed by: NURSE PRACTITIONER

## 2019-08-01 PROCEDURE — 73560 X-RAY EXAM OF KNEE 1 OR 2: CPT | Mod: 26,59,LT, | Performed by: RADIOLOGY

## 2019-08-01 PROCEDURE — 73560 XR KNEE ORTHO RIGHT: ICD-10-PCS | Mod: 26,59,LT, | Performed by: RADIOLOGY

## 2019-08-01 PROCEDURE — 73560 X-RAY EXAM OF KNEE 1 OR 2: CPT | Mod: TC,LT

## 2019-08-01 PROCEDURE — 99213 PR OFFICE/OUTPT VISIT, EST, LEVL III, 20-29 MIN: ICD-10-PCS | Mod: S$GLB,,, | Performed by: NURSE PRACTITIONER

## 2019-08-01 PROCEDURE — 99213 OFFICE O/P EST LOW 20 MIN: CPT | Mod: S$GLB,,, | Performed by: NURSE PRACTITIONER

## 2019-08-01 PROCEDURE — 1100F PR PT FALLS ASSESS DOC 2+ FALLS/FALL W/INJURY/YR: ICD-10-PCS | Mod: CPTII,S$GLB,, | Performed by: NURSE PRACTITIONER

## 2019-08-01 PROCEDURE — 3074F SYST BP LT 130 MM HG: CPT | Mod: CPTII,S$GLB,, | Performed by: NURSE PRACTITIONER

## 2019-08-01 PROCEDURE — 71250 CT THORAX DX C-: CPT | Mod: TC

## 2019-08-01 PROCEDURE — 3288F PR FALLS RISK ASSESSMENT DOCUMENTED: ICD-10-PCS | Mod: CPTII,S$GLB,, | Performed by: NURSE PRACTITIONER

## 2019-08-01 PROCEDURE — 3078F DIAST BP <80 MM HG: CPT | Mod: CPTII,S$GLB,, | Performed by: NURSE PRACTITIONER

## 2019-08-01 PROCEDURE — 73562 X-RAY EXAM OF KNEE 3: CPT | Mod: 26,RT,, | Performed by: RADIOLOGY

## 2019-08-01 PROCEDURE — 73562 XR KNEE ORTHO RIGHT: ICD-10-PCS | Mod: 26,RT,, | Performed by: RADIOLOGY

## 2019-08-01 PROCEDURE — 3078F PR MOST RECENT DIASTOLIC BLOOD PRESSURE < 80 MM HG: ICD-10-PCS | Mod: CPTII,S$GLB,, | Performed by: NURSE PRACTITIONER

## 2019-08-01 PROCEDURE — 3288F FALL RISK ASSESSMENT DOCD: CPT | Mod: CPTII,S$GLB,, | Performed by: NURSE PRACTITIONER

## 2019-08-01 PROCEDURE — 1100F PTFALLS ASSESS-DOCD GE2>/YR: CPT | Mod: CPTII,S$GLB,, | Performed by: NURSE PRACTITIONER

## 2019-08-01 PROCEDURE — 3074F PR MOST RECENT SYSTOLIC BLOOD PRESSURE < 130 MM HG: ICD-10-PCS | Mod: CPTII,S$GLB,, | Performed by: NURSE PRACTITIONER

## 2019-08-01 NOTE — PROGRESS NOTES
SUBJECTIVE:     Chief Complaint & History of Present Illness:  Manisha Wynne is a Established 68 y.o. year old female patient here for follow up of her right knee pain.  She was last seen by me on 6/20/19.  Since her last visit, she has been going to therapy and has done well.  She is able to negotiate steps per therapy report.  She reports having intermittent pain that radiates up and down leg.  States pain is minimal and controlled with OTC Tylenol and NSAIDs.  She denies fever, chills or numbness to her lower extremity.      Review of patient's allergies indicates:  No Known Allergies      Current Outpatient Medications   Medication Sig Dispense Refill    aspirin (ECOTRIN) 81 MG EC tablet Take 81 mg by mouth. 1 Tablet, Delayed Release (E.C.) Oral Every day      buPROPion (WELLBUTRIN XL) 300 MG 24 hr tablet Take 300 mg by mouth once daily.       calcium-vitamin D 600 mg(1,500mg) -200 unit Tab Take 1 tablet by mouth once daily.       cholecalciferol, vitamin D3, (VITAMIN D3) 5,000 unit Tab Take 5,000 Units by mouth once daily.      ferrous sulfate (FEOSOL) 325 mg (65 mg iron) Tab tablet Take 325 mg by mouth daily with breakfast. Taking two times daily      FLUoxetine (PROZAC) 20 MG capsule Take 20 mg by mouth once daily.      lisdexamfetamine (VYVANSE) 70 MG capsule Take 70 mg by mouth every morning.      losartan (COZAAR) 25 MG tablet TAKE 1 TABLET(25 MG) BY MOUTH EVERY DAY (Patient taking differently: Take 12.5 mg by mouth once daily. TAKE 1/2 TABLET(12.5 MG) BY MOUTH EVERY DAY) 90 tablet 3    metoprolol tartrate (LOPRESSOR) 25 MG tablet Take 1 tablet (25 mg total) by mouth 2 (two) times daily. 180 tablet 3    multivitamin-minerals-lutein (CENTRUM SILVER) Tab Take by mouth. 1 Tablet Oral Every day      pravastatin (PRAVACHOL) 10 MG tablet Take 1 tablet (10 mg total) by mouth once daily. 90 tablet 3     No current facility-administered medications for this visit.        Past Medical History:    Diagnosis Date    Abnormal Pap smear     Atrial fibrillation     AV block, 1st degree 7/25/2012    Cataract     Depression 7/24/2012    Facet arthritis of lumbar region 3/31/2015    Falls     3 falls in the last 6 mos--noted 6/19/19    Hyperlipidemia     Hypertension 7/24/2012    Other specified cardiac dysrhythmias(427.89)     Syncope 7/24/2012       Past Surgical History:   Procedure Laterality Date    CARDIAC PACEMAKER PLACEMENT  09/07/2012    [id number]    COLONOSCOPY N/A 8/7/2015    Performed by Srinivas Tena [facility] at [facility] (4TH F[facility])    DILATION AND CURETTAGE OF UTERUS      Hx of precancerous cells?    INJECTION-STEROID-EPIDURAL-TRANSFORAMINAL Left 6/25/2015    [facility]    TONSILLECTOMY      ULTRASOUND, UPPER GI TRACT, ENDOSCOPIC N/A 7/5/2019    Performed by Kev Calderon MD at [facility] ENDO (2ND FLR)       Family History   Adopted: Yes   Problem Relation Age of Onset    Amblyopia Neg Hx     Blindness Neg Hx     Cataracts Neg Hx     Glaucoma Neg Hx     Macular degeneration Neg Hx     Retinal detachment Neg Hx          Review of Systems:  ROS:  Constitutional: no fever or chills  Eyes: no visual changes  ENT: no nasal congestion or sore throat  Respiratory: no cough or shortness of breath  Cardiovascular: no chest pain or palpitations  Gastrointestinal: no nausea or vomiting, tolerating diet  Genitourinary: no hematuria or dysuria  Integument/Breast: no rash or pruritis  Hematologic/Lymphatic: no easy bruising or lymphadenopathy  Musculoskeletal: no arthralgias or myalgias  Neurological: no seizures or tremors  Behavioral/Psych: no auditory or visual hallucinations  Endocrine: no heat or cold intolerance      OBJECTIVE:     PHYSICAL EXAM:  Vital Signs (Most Recent)  Vitals:    08/01/19 1501   BP: 102/70   Pulse: 65   Temp: 98.1 °F (36.7 °C)       Weight: 72.2 kg (159 lb 2.8 oz),   General Appearance: Well nourished, well developed,  in no acute distress.  HENT: Normal cephalic, oropharynx pink and moist  Eyes: PERRLA bilaterally and EOM x 4  Respiratory: Even and unlabored  Skin: Warm and Dry.   Psychiatric: AAO x 4, Mood & affect are normal.    right  Knee Exam:  Knee Range of Motion:normal   Effusion:not significant  Condition of skin:intact  Location of tenderness: none   Strength:normal  Stability:  stable to testing, Lachman: stable, LCL: stable, MCL: stable and PCL: stable  Varus /Valgus stress:  normal  Julio:   negative      Hip Examination:  full painless range of motion, without tenderness    RADIOGRAPHS:  X-ray of right knee obtained today, personally reviewed by me.  No fracture or dislocation noted in knee joint.  Patella appears intact without fracture or dislocation.    ASSESSMENT/PLAN:       ICD-10-CM ICD-9-CM   1. Right knee pain, unspecified chronicity M25.561 719.46       Plan: We discussed with the patient at length all the different treatment options available for  the knee including anti-inflammatories, acetaminophen, rest, ice, knee strengthening exercise, occasional cortisone injections for temporary relief, Viscosupplimentation injections, arthroscopic debridement osteotomy, and finally knee arthroplasty.     -Patient here for follow up of her right knee injury she sustained in [place] at the beginning of June.    -X-ray shows osseous structures with relative increased radiolucency within the inferior aspect of the right patella.    -Use knee brace for stabilization PRN.  -Continue Tylenol PRN for pain control.  Avoid NSAIDs given cardiac history.  -Ice and elevate PRN.  -Follow up in clinic in 3 months PRN or sooner for new or worsening pain.

## 2019-08-02 ENCOUNTER — PATIENT OUTREACH (OUTPATIENT)
Dept: ADMINISTRATIVE | Facility: HOSPITAL | Age: 69
End: 2019-08-02

## 2019-08-02 NOTE — PROGRESS NOTES
Chart review completed. Immunizations reconciled, HM modifiers updated, HM duplicate entries deleted, care team updated, old orders deleted. Pt health maintenance is up to date.

## 2019-08-05 ENCOUNTER — OFFICE VISIT (OUTPATIENT)
Dept: INTERNAL MEDICINE | Facility: CLINIC | Age: 69
End: 2019-08-05
Payer: MEDICARE

## 2019-08-05 ENCOUNTER — LAB VISIT (OUTPATIENT)
Dept: LAB | Facility: HOSPITAL | Age: 69
End: 2019-08-05
Attending: INTERNAL MEDICINE
Payer: MEDICARE

## 2019-08-05 VITALS
OXYGEN SATURATION: 96 % | SYSTOLIC BLOOD PRESSURE: 118 MMHG | DIASTOLIC BLOOD PRESSURE: 70 MMHG | BODY MASS INDEX: 25.15 KG/M2 | TEMPERATURE: 98 F | HEIGHT: 66 IN | WEIGHT: 156.5 LBS | HEART RATE: 72 BPM
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
## 2019-08-05 DIAGNOSIS — Z12.31 SCREENING MAMMOGRAM, ENCOUNTER FOR: ICD-10-CM

## 2019-08-05 DIAGNOSIS — R91.1 LUNG NODULE: ICD-10-CM

## 2019-08-05 DIAGNOSIS — R79.89 ELEVATED LFTS: ICD-10-CM

## 2019-08-05 LAB
ALBUMIN SERPL BCP-MCNC: 4.1 G/DL (ref 3.5–5.2)
ALP SERPL-CCNC: 78 U/L (ref 55–135)
ALT SERPL W/O P-5'-P-CCNC: 33 U/L (ref 10–44)
ANION GAP SERPL CALC-SCNC: 9 MMOL/L (ref 8–16)
AST SERPL-CCNC: 27 U/L (ref 10–40)
BILIRUB SERPL-MCNC: 0.4 MG/DL (ref 0.1–1)
BUN SERPL-MCNC: 24 MG/DL (ref 8–23)
CALCIUM SERPL-MCNC: 10.1 MG/DL (ref 8.7–10.5)
CHLORIDE SERPL-SCNC: 106 MMOL/L (ref 95–110)
CO2 SERPL-SCNC: 24 MMOL/L (ref 23–29)
CREAT SERPL-MCNC: 1.1 MG/DL (ref 0.5–1.4)
EST. GFR  (AFRICAN AMERICAN): 58.8 ML/MIN/1.73 M^2
EST. GFR  (NON AFRICAN AMERICAN): 51 ML/MIN/1.73 M^2
GLUCOSE SERPL-MCNC: 114 MG/DL (ref 70–110)
POTASSIUM SERPL-SCNC: 4.3 MMOL/L (ref 3.5–5.1)
PROT SERPL-MCNC: 7.3 G/DL (ref 6–8.4)
SODIUM SERPL-SCNC: 139 MMOL/L (ref 136–145)

## 2019-08-05 PROCEDURE — 1101F PT FALLS ASSESS-DOCD LE1/YR: CPT | Mod: HCNC,CPTII,S$GLB, | Performed by: INTERNAL MEDICINE

## 2019-08-05 PROCEDURE — 3078F PR MOST RECENT DIASTOLIC BLOOD PRESSURE < 80 MM HG: ICD-10-PCS | Mod: HCNC,CPTII,S$GLB, | Performed by: INTERNAL MEDICINE

## 2019-08-05 PROCEDURE — 99213 OFFICE O/P EST LOW 20 MIN: CPT | Mod: HCNC,S$GLB,, | Performed by: INTERNAL MEDICINE

## 2019-08-05 PROCEDURE — 3078F DIAST BP <80 MM HG: CPT | Mod: HCNC,CPTII,S$GLB, | Performed by: INTERNAL MEDICINE

## 2019-08-05 PROCEDURE — 1101F PR PT FALLS ASSESS DOC 0-1 FALLS W/OUT INJ PAST YR: ICD-10-PCS | Mod: HCNC,CPTII,S$GLB, | Performed by: INTERNAL MEDICINE

## 2019-08-05 PROCEDURE — 36415 COLL VENOUS BLD VENIPUNCTURE: CPT | Mod: PO

## 2019-08-05 PROCEDURE — 99999 PR PBB SHADOW E&M-EST. PATIENT-LVL V: CPT | Mod: PBBFAC,HCNC,, | Performed by: INTERNAL MEDICINE

## 2019-08-05 PROCEDURE — 80053 COMPREHEN METABOLIC PANEL: CPT

## 2019-08-05 PROCEDURE — 3074F PR MOST RECENT SYSTOLIC BLOOD PRESSURE < 130 MM HG: ICD-10-PCS | Mod: HCNC,CPTII,S$GLB, | Performed by: INTERNAL MEDICINE

## 2019-08-05 PROCEDURE — 99213 PR OFFICE/OUTPT VISIT, EST, LEVL III, 20-29 MIN: ICD-10-PCS | Mod: HCNC,S$GLB,, | Performed by: INTERNAL MEDICINE

## 2019-08-05 PROCEDURE — 99999 PR PBB SHADOW E&M-EST. PATIENT-LVL V: ICD-10-PCS | Mod: PBBFAC,HCNC,, | Performed by: INTERNAL MEDICINE

## 2019-08-05 PROCEDURE — 3074F SYST BP LT 130 MM HG: CPT | Mod: HCNC,CPTII,S$GLB, | Performed by: INTERNAL MEDICINE

## 2019-08-12 ENCOUNTER — OFFICE VISIT (OUTPATIENT)
Dept: PSYCHIATRY | Facility: CLINIC | Age: 69
End: 2019-08-12
Payer: MEDICARE

## 2019-08-12 DIAGNOSIS — F98.8 ATTENTION DEFICIT DISORDER (ADD) IN ADULT: ICD-10-CM

## 2019-08-12 DIAGNOSIS — F33.41 MAJOR DEPRESSIVE DISORDER, RECURRENT EPISODE, IN PARTIAL REMISSION: Primary | ICD-10-CM

## 2019-08-12 PROCEDURE — 90834 PR PSYCHOTHERAPY W/PATIENT, 45 MIN: ICD-10-PCS | Mod: S$GLB,,, | Performed by: SOCIAL WORKER

## 2019-08-12 PROCEDURE — 99999 PR PBB SHADOW E&M-EST. PATIENT-LVL I: CPT | Mod: PBBFAC,,, | Performed by: SOCIAL WORKER

## 2019-08-12 PROCEDURE — 90834 PSYTX W PT 45 MINUTES: CPT | Mod: S$GLB,,, | Performed by: SOCIAL WORKER

## 2019-08-12 PROCEDURE — 99999 PR PBB SHADOW E&M-EST. PATIENT-LVL I: ICD-10-PCS | Mod: PBBFAC,,, | Performed by: SOCIAL WORKER

## 2019-08-12 NOTE — PROGRESS NOTES
Individual Psychotherapy (PhD/LCSW)    8/12/2019    Site: [facility]    Therapeutic Intervention: Met with patient alone.  Outpatient - Insight oriented psychotherapy 45 min - CPT code 05989    Chief complaint/reason for encounter: depression, distractibility     Interval history and content of current session: Pt set up system to motivate herself to work more by donating a set amount to tabitha if she doesn't keep to her schedule. She is focused today on frustration with colleague who is not pulling his weight. Help pt shift from anger to solutions.    Treatment plan:  · Target symptoms: depression, distractibility  · Why chosen therapy is appropriate versus another modality: relevant to diagnosis  · Outcome monitoring methods: self-report  · Therapeutic intervention type: insight oriented psychotherapy    Risk parameters:  Patient reports no suicidal ideation  Patient reports no homicidal ideation  Patient reports no self-injurious behavior  Patient reports no violent behavior    Verbal deficits: None    Patient's response to intervention:  The patient's response to intervention is motivated    Progress toward goals and other mental status changes:  The patient's progress toward goals is progressing well    Diagnosis: Major depression, recurrent, in partial remission,  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

## 2019-08-19 NOTE — PROGRESS NOTES
Subjective:       Patient ID: Manisha Wynne is a 68 y.o. female.    Chief Complaint: Follow-up    HPIPt is feeling well - no CP or SOB.  NO recent falls. Has a follow up with EP.    Review of Systems   Respiratory: Negative for shortness of breath (PND or orthopnea).    Cardiovascular: Negative for chest pain (arm pain or jaw pain).   Gastrointestinal: Negative for abdominal pain, diarrhea, nausea and vomiting.   Genitourinary: Negative for dysuria.   Neurological: Negative for seizures, syncope and headaches.       Objective:      Physical Exam   Constitutional: She is oriented to person, place, and time. She appears well-developed and well-nourished. No distress.   HENT:   Head: Normocephalic.   Mouth/Throat: Oropharynx is clear and moist.   Eyes: Pupils are equal, round, and reactive to light. EOM are normal.   Neck: Neck supple. No JVD present. No thyromegaly present.   Cardiovascular: Normal rate, regular rhythm, normal heart sounds and intact distal pulses. Exam reveals no gallop and no friction rub.   No murmur heard.  Pulmonary/Chest: Effort normal and breath sounds normal. She has no wheezes. She has no rales.   Abdominal: Soft. Bowel sounds are normal. She exhibits no distension and no mass. There is no tenderness. There is no rebound and no guarding.   Musculoskeletal: She exhibits no edema.   Lymphadenopathy:     She has no cervical adenopathy.   Neurological: She is alert and oriented to person, place, and time. She has normal reflexes.   Skin: Skin is warm and dry.   Psychiatric: She has a normal mood and affect. Her behavior is normal. Judgment and thought content normal.       Assessment:       1. Fall, subsequent encounter    2. Lung nodule    3. Screening mammogram, encounter for    4. Elevated LFTs        Plan:   Fall, subsequent encounter  -     Ambulatory consult to Neurology    Lung nodule  -     CT Chest Without Contrast; Future; Expected date: 08/05/2019    Screening mammogram,  encounter for  -     Mammo Digital Screening Bilat w/ Dre; Future; Expected date: 08/05/2019    Elevated LFTs  -     Comprehensive metabolic panel; Future; Expected date: 08/05/2019

## 2019-08-20 ENCOUNTER — PATIENT MESSAGE (OUTPATIENT)
Dept: PSYCHIATRY | Facility: CLINIC | Age: 69
End: 2019-08-20

## 2019-08-21 ENCOUNTER — OFFICE VISIT (OUTPATIENT)
Dept: NEUROLOGY | Facility: CLINIC | Age: 69
End: 2019-08-21
Payer: MEDICARE

## 2019-08-21 VITALS
SYSTOLIC BLOOD PRESSURE: 157 MMHG | BODY MASS INDEX: 25.33 KG/M2 | WEIGHT: 157.63 LBS | HEART RATE: 70 BPM | HEIGHT: 66 IN | DIASTOLIC BLOOD PRESSURE: 81 MMHG

## 2019-08-21 DIAGNOSIS — G60.8 SENSORY PERIPHERAL NEUROPATHY: ICD-10-CM

## 2019-08-21 PROCEDURE — 1101F PR PT FALLS ASSESS DOC 0-1 FALLS W/OUT INJ PAST YR: ICD-10-PCS | Mod: HCNC,CPTII,S$GLB, | Performed by: NEUROMUSCULOSKELETAL MEDICINE & OMM

## 2019-08-21 PROCEDURE — 99999 PR PBB SHADOW E&M-EST. PATIENT-LVL III: ICD-10-PCS | Mod: PBBFAC,HCNC,, | Performed by: NEUROMUSCULOSKELETAL MEDICINE & OMM

## 2019-08-21 PROCEDURE — 99214 PR OFFICE/OUTPT VISIT, EST, LEVL IV, 30-39 MIN: ICD-10-PCS | Mod: HCNC,S$GLB,, | Performed by: NEUROMUSCULOSKELETAL MEDICINE & OMM

## 2019-08-21 PROCEDURE — 3079F PR MOST RECENT DIASTOLIC BLOOD PRESSURE 80-89 MM HG: ICD-10-PCS | Mod: HCNC,CPTII,S$GLB, | Performed by: NEUROMUSCULOSKELETAL MEDICINE & OMM

## 2019-08-21 PROCEDURE — 99214 OFFICE O/P EST MOD 30 MIN: CPT | Mod: HCNC,S$GLB,, | Performed by: NEUROMUSCULOSKELETAL MEDICINE & OMM

## 2019-08-21 PROCEDURE — 1101F PT FALLS ASSESS-DOCD LE1/YR: CPT | Mod: HCNC,CPTII,S$GLB, | Performed by: NEUROMUSCULOSKELETAL MEDICINE & OMM

## 2019-08-21 PROCEDURE — 3079F DIAST BP 80-89 MM HG: CPT | Mod: HCNC,CPTII,S$GLB, | Performed by: NEUROMUSCULOSKELETAL MEDICINE & OMM

## 2019-08-21 PROCEDURE — 99999 PR PBB SHADOW E&M-EST. PATIENT-LVL III: CPT | Mod: PBBFAC,HCNC,, | Performed by: NEUROMUSCULOSKELETAL MEDICINE & OMM

## 2019-08-21 PROCEDURE — 3077F SYST BP >= 140 MM HG: CPT | Mod: HCNC,CPTII,S$GLB, | Performed by: NEUROMUSCULOSKELETAL MEDICINE & OMM

## 2019-08-21 PROCEDURE — 3077F PR MOST RECENT SYSTOLIC BLOOD PRESSURE >= 140 MM HG: ICD-10-PCS | Mod: HCNC,CPTII,S$GLB, | Performed by: NEUROMUSCULOSKELETAL MEDICINE & OMM

## 2019-08-21 NOTE — PROGRESS NOTES
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  Present history:  patient continues to have some difficulty walking.  She does note that looking at her feet improves her balance.  Blood work has been negative including B12 folate and protein electrophoresis.  She does not know her family history for diabetes since she was adopted.    Previous note:  6-25-19:  This is a 70-year-old white female referred for right by who presents with several episodes of falling dating back to 2012.  Patient was recently at a conference in May in [place] and fell angelita even more recently on May 31st fell again at which time she fractured her kneecap..  She states that she does not feel the floor like she think she should. In   2015 she had a bad bout of left sciatica lasting 6 months for which she eventually had an epidural steroid injection to cleared up.  History chronic renal disease possibly a source of neuropathy     Neurological Exam:  Mental status-alert and oriented to person, place, and time; attention span and concentration is good. Fund of knowledge-patient is aware of current events and able to give detailed history of the current problem.recent and remote memory seems intact. Language function is normal with no evidence of aphasia  Cranial nerves:Visual acuity to hand chart -normal; visual fields to confrontation normal;pupils were equal and reactive to light ;no evidence of ptosis ;  funduscopic examination was normal with sharp disc margins. external ocular movements were full with no nystagmus. Facial sensation to pinprick : normal ; corneal reflexes intact; Facial muscles were symmetrical. Hearing is unimpaired symmetrical finger rub; Tongue movements - normal ; palate movements - normal ;Swallowing unimpaired. Shoulder shrug was intact with good strength Speech was normal  Motor examination: Upper : normal                                      Lower extremities -poor heel walk; hammertoes are  evident.  tone was normal ;                  Right-handed  Sensory examination:   Upper; normal pinprick and soft touch ;   Lower extremities - normal and symmetrical.   Vibration sense:  Absent @ toes  Deep tendon reflexes: upper extremities :1-2+ symmetrical ;     lower extremities KJ- left -3 +; AJ - 1/2+ Both plantar responses were flexor  Cerebellar examination upper: Normal finger to nose and rapid alternating movements  Gait:  Limited gait due to knee fracture.    heel and toe walk normal  Romberg test:  Positive       Tandem gait:  ReportInvoluntary movements: Negative  TMJ - no tenderness  Cervical examination: Full range of motion with no pain Cervical tenderness :negative  Lumbar examination: Low back tenderness-negative                  Sciatic notchtenderness-negative            Straight leg raising : negative     Impression:  sensory peripheral neuropathy with negative workup.  lumbar disc disease with sciatica in the past  Recommendations/Plan :  long discussion in terms of further workup.  History significant weight loss possibly prediabetic.    no further workup at this time.  Follow-up 4 months

## 2019-08-26 ENCOUNTER — OFFICE VISIT (OUTPATIENT)
Dept: PSYCHIATRY | Facility: CLINIC | Age: 69
End: 2019-08-26
Payer: MEDICARE

## 2019-08-26 DIAGNOSIS — F33.41 MAJOR DEPRESSIVE DISORDER, RECURRENT EPISODE, IN PARTIAL REMISSION: Primary | ICD-10-CM

## 2019-08-26 DIAGNOSIS — F98.8 ATTENTION DEFICIT DISORDER (ADD) IN ADULT: ICD-10-CM

## 2019-08-26 PROCEDURE — 90834 PR PSYCHOTHERAPY W/PATIENT, 45 MIN: ICD-10-PCS | Mod: HCNC,S$GLB,, | Performed by: SOCIAL WORKER

## 2019-08-26 PROCEDURE — 99999 PR PBB SHADOW E&M-EST. PATIENT-LVL I: CPT | Mod: PBBFAC,HCNC,, | Performed by: SOCIAL WORKER

## 2019-08-26 PROCEDURE — 99999 PR PBB SHADOW E&M-EST. PATIENT-LVL I: ICD-10-PCS | Mod: PBBFAC,HCNC,, | Performed by: SOCIAL WORKER

## 2019-08-26 PROCEDURE — 90834 PSYTX W PT 45 MINUTES: CPT | Mod: HCNC,S$GLB,, | Performed by: SOCIAL WORKER

## 2019-08-26 NOTE — PROGRESS NOTES
Individual Psychotherapy (PhD/LCSW)    8/26/2019    Site: [facility]    Therapeutic Intervention: Met with patient alone.  Outpatient - Insight oriented psychotherapy 45 min - CPT code 78777    Chief complaint/reason for encounter: depression, distractibility     Interval history and content of current session: Pt doing well, had her review today at work, which was positive, got a small raise. Pt had considered applying at [employer] for full-time job, discuss reasons why current job suits her well. She did request more patient contact and was promised this. Pt gradually unpacking and de-cluttering at home. She is getting out and participating in activities, making some plans, got new glasses. Doing well.    Treatment plan:  · Target symptoms: depression, distractibility  · Why chosen therapy is appropriate versus another modality: relevant to diagnosis  · Outcome monitoring methods: self-report  · Therapeutic intervention type: insight oriented psychotherapy    Risk parameters:  Patient reports no suicidal ideation  Patient reports no homicidal ideation  Patient reports no self-injurious behavior  Patient reports no violent behavior    Verbal deficits: None    Patient's response to intervention:  The patient's response to intervention is motivated    Progress toward goals and other mental status changes:  The patient's progress toward goals is progressing well    Diagnosis: Major depression, recurrent, in partial remission,  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

## 2019-09-09 ENCOUNTER — DOCUMENTATION ONLY (OUTPATIENT)
Dept: PSYCHIATRY | Facility: CLINIC | Age: 69
End: 2019-09-09

## 2019-09-11 ENCOUNTER — OFFICE VISIT (OUTPATIENT)
Dept: OTOLARYNGOLOGY | Facility: CLINIC | Age: 69
End: 2019-09-11
Payer: MEDICARE

## 2019-09-11 ENCOUNTER — HOSPITAL ENCOUNTER (OUTPATIENT)
Dept: ENDOCRINOLOGY | Facility: CLINIC | Age: 69
Discharge: HOME | End: 2019-09-11
Attending: INTERNAL MEDICINE
Payer: MEDICARE

## 2019-09-11 VITALS
DIASTOLIC BLOOD PRESSURE: 88 MMHG | WEIGHT: 156.5 LBS | HEART RATE: 81 BPM | BODY MASS INDEX: 25.26 KG/M2 | SYSTOLIC BLOOD PRESSURE: 180 MMHG

## 2019-09-11 DIAGNOSIS — M85.80 OSTEOPENIA, UNSPECIFIED LOCATION: ICD-10-CM

## 2019-09-11 DIAGNOSIS — E21.0 PRIMARY HYPERPARATHYROIDISM: ICD-10-CM

## 2019-09-11 DIAGNOSIS — H61.23 BILATERAL IMPACTED CERUMEN: Primary | ICD-10-CM

## 2019-09-11 DIAGNOSIS — N18.30 CKD (CHRONIC KIDNEY DISEASE) STAGE 3, GFR 30-59 ML/MIN: ICD-10-CM

## 2019-09-11 DIAGNOSIS — I10 ESSENTIAL HYPERTENSION: Chronic | ICD-10-CM

## 2019-09-11 PROCEDURE — 99999 PR PBB SHADOW E&M-EST. PATIENT-LVL III: ICD-10-PCS | Mod: PBBFAC,HCNC,, | Performed by: OTOLARYNGOLOGY

## 2019-09-11 PROCEDURE — 69210 REMOVE IMPACTED EAR WAX UNI: CPT | Mod: HCNC,S$GLB,, | Performed by: OTOLARYNGOLOGY

## 2019-09-11 PROCEDURE — 76536 US EXAM OF HEAD AND NECK: CPT | Mod: HCNC,S$GLB,, | Performed by: INTERNAL MEDICINE

## 2019-09-11 PROCEDURE — 76536 US SOFT TISSUE HEAD NECK THYROID: ICD-10-PCS | Mod: HCNC,S$GLB,, | Performed by: INTERNAL MEDICINE

## 2019-09-11 PROCEDURE — 69210 EAR CERUMEN REMOVAL: ICD-10-PCS | Mod: HCNC,S$GLB,, | Performed by: OTOLARYNGOLOGY

## 2019-09-11 PROCEDURE — 99499 UNLISTED E&M SERVICE: CPT | Mod: HCNC,S$GLB,, | Performed by: OTOLARYNGOLOGY

## 2019-09-11 PROCEDURE — 69210 REMOVE IMPACTED EAR WAX UNI: CPT

## 2019-09-11 PROCEDURE — 99999 PR PBB SHADOW E&M-EST. PATIENT-LVL III: CPT | Mod: PBBFAC,HCNC,, | Performed by: OTOLARYNGOLOGY

## 2019-09-11 PROCEDURE — 99499 NO LOS: ICD-10-PCS | Mod: HCNC,S$GLB,, | Performed by: OTOLARYNGOLOGY

## 2019-09-11 NOTE — PROCEDURES
Ear Cerumen Removal  Date/Time: 9/11/2019 3:03 PM  Performed by: Timothy Escobar MD  Authorized by: Timothy Escobar MD     Consent Done?:  Yes (Verbal)  Location details:  Both ears  Procedure type: curette    Patient tolerance:  Patient tolerated the procedure well with no immediate complications     Binocular microscopy used to examine ear canal and tympanic membrane.  There was a cerumen impaction on the right side and an impaction of cerumen on the left.  This was removed using a curette and other microinstrumentation.

## 2019-09-15 DIAGNOSIS — E78.5 HYPERLIPIDEMIA, UNSPECIFIED HYPERLIPIDEMIA TYPE: ICD-10-CM

## 2019-09-16 RX ORDER — PRAVASTATIN SODIUM 10 MG/1
10 TABLET ORAL DAILY
Qty: 90 TABLET | Refills: 3 | Status: SHIPPED | OUTPATIENT
Start: 2019-09-16 | End: 2020-09-15

## 2019-09-17 ENCOUNTER — OFFICE VISIT (OUTPATIENT)
Dept: SURGERY | Facility: CLINIC | Age: 69
End: 2019-09-17
Payer: MEDICARE

## 2019-09-17 ENCOUNTER — PATIENT MESSAGE (OUTPATIENT)
Dept: INTERNAL MEDICINE | Facility: CLINIC | Age: 69
End: 2019-09-17

## 2019-09-17 VITALS
DIASTOLIC BLOOD PRESSURE: 81 MMHG | SYSTOLIC BLOOD PRESSURE: 156 MMHG | HEIGHT: 69 IN | HEART RATE: 74 BPM | BODY MASS INDEX: 24.09 KG/M2 | WEIGHT: 162.69 LBS

## 2019-09-17 DIAGNOSIS — E21.0 PRIMARY HYPERPARATHYROIDISM: Primary | ICD-10-CM

## 2019-09-17 DIAGNOSIS — M85.80 OSTEOPENIA, UNSPECIFIED LOCATION: ICD-10-CM

## 2019-09-17 DIAGNOSIS — N18.30 CKD (CHRONIC KIDNEY DISEASE) STAGE 3, GFR 30-59 ML/MIN: ICD-10-CM

## 2019-09-17 PROCEDURE — 99999 PR PBB SHADOW E&M-EST. PATIENT-LVL III: ICD-10-PCS | Mod: PBBFAC,HCNC,, | Performed by: SURGERY

## 2019-09-17 PROCEDURE — 3077F PR MOST RECENT SYSTOLIC BLOOD PRESSURE >= 140 MM HG: ICD-10-PCS | Mod: HCNC,CPTII,S$GLB, | Performed by: SURGERY

## 2019-09-17 PROCEDURE — 1101F PR PT FALLS ASSESS DOC 0-1 FALLS W/OUT INJ PAST YR: ICD-10-PCS | Mod: HCNC,CPTII,S$GLB, | Performed by: SURGERY

## 2019-09-17 PROCEDURE — 1101F PT FALLS ASSESS-DOCD LE1/YR: CPT | Mod: HCNC,CPTII,S$GLB, | Performed by: SURGERY

## 2019-09-17 PROCEDURE — 99499 UNLISTED E&M SERVICE: CPT | Mod: HCNC,S$GLB,, | Performed by: SURGERY

## 2019-09-17 PROCEDURE — 99499 RISK ADDL DX/OHS AUDIT: ICD-10-PCS | Mod: HCNC,S$GLB,, | Performed by: SURGERY

## 2019-09-17 PROCEDURE — 3079F PR MOST RECENT DIASTOLIC BLOOD PRESSURE 80-89 MM HG: ICD-10-PCS | Mod: HCNC,CPTII,S$GLB, | Performed by: SURGERY

## 2019-09-17 PROCEDURE — 3077F SYST BP >= 140 MM HG: CPT | Mod: HCNC,CPTII,S$GLB, | Performed by: SURGERY

## 2019-09-17 PROCEDURE — 99204 OFFICE O/P NEW MOD 45 MIN: CPT | Mod: HCNC,S$GLB,, | Performed by: SURGERY

## 2019-09-17 PROCEDURE — 3079F DIAST BP 80-89 MM HG: CPT | Mod: HCNC,CPTII,S$GLB, | Performed by: SURGERY

## 2019-09-17 PROCEDURE — 99204 PR OFFICE/OUTPT VISIT, NEW, LEVL IV, 45-59 MIN: ICD-10-PCS | Mod: HCNC,S$GLB,, | Performed by: SURGERY

## 2019-09-17 PROCEDURE — 99999 PR PBB SHADOW E&M-EST. PATIENT-LVL III: CPT | Mod: PBBFAC,HCNC,, | Performed by: SURGERY

## 2019-09-17 NOTE — PROGRESS NOTES
GENERAL SURGERY  History and Physical     SUBJECTIVE:     HISTORY OF PRESENT ILLNESS:  Manisha Wynne is a 69 y.o. female with history of CKD3 (BUN/Cr 24/1.1,GFR 51.0), osteopenia, HTN, Afib and first degree heart block (s/p pacemaker placement 2016), and frequent falls who has been referred to surgery clinic for hyperparathyroidism. She was found to have hypercalcemia on routine labs in January of 2019. She was then seen by endocrinology in July of 2019. She has had multiple imaging studies to date with evidence of midline mediastinal parathyroid adenoma, as well as multiple small thyroid nodules. She was referred to clinic for evaluation of her parathyroid adenoma.     , 4 months ago. Calcium 10.1 on 8/5. Urine calcium WNL. Vit D 68, 2 months ago.    She takes daily calcium and vitamin D 5000 iu qd. She denied current or past lithium use. Denies constipation, depression, polyuria. Has had several falls in the past with fx. No kidney stones.  FH unknown, patient adopted.   MEDICATIONS:  Home Medications:  Current Outpatient Medications on File Prior to Visit   Medication Sig Dispense Refill    aspirin (ECOTRIN) 81 MG EC tablet Take 81 mg by mouth. 1 Tablet, Delayed Release (E.C.) Oral Every day      buPROPion (WELLBUTRIN XL) 300 MG 24 hr tablet Take 300 mg by mouth once daily.       calcium-vitamin D 600 mg(1,500mg) -200 unit Tab Take 1 tablet by mouth once daily.       cholecalciferol, vitamin D3, (VITAMIN D3) 5,000 unit Tab Take 5,000 Units by mouth once daily.      ferrous sulfate (FEOSOL) 325 mg (65 mg iron) Tab tablet Take 325 mg by mouth daily with breakfast. Taking two times daily      FLUoxetine (PROZAC) 20 MG capsule Take 20 mg by mouth once daily.      lisdexamfetamine (VYVANSE) 70 MG capsule Take 70 mg by mouth every morning.      metoprolol tartrate (LOPRESSOR) 25 MG tablet Take 1 tablet (25 mg total) by mouth 2 (two) times daily. 180 tablet 3    multivitamin-minerals-lutein  (CENTRUM SILVER) Tab Take by mouth. 1 Tablet Oral Every day      pravastatin (PRAVACHOL) 10 MG tablet TAKE 1 TABLET (10 MG TOTAL) BY MOUTH ONCE DAILY. 90 tablet 3     No current facility-administered medications on file prior to visit.        ALLERGIES:    Review of patient's allergies indicates:  No Known Allergies    PAST MEDICAL HISTORY:    Past Medical History:   Diagnosis Date    Abnormal Pap smear     Atrial fibrillation     AV block, 1st degree 2012    Cataract     Depression 2012    Facet arthritis of lumbar region 3/31/2015    Falls     3 falls in the last 6 mos--noted 19    Hyperlipidemia     Hypertension 2012    Other specified cardiac dysrhythmias(427.89)     Syncope 2012       SURGICAL HISTORY:  Past Surgical History:   Procedure Laterality Date    CARDIAC PACEMAKER PLACEMENT  2012    [id number]    COLONOSCOPY N/A 2015    Performed by Srinivas Tena [facility] at [facility] ENDO (4TH FLR)    DILATION AND CURETTAGE OF UTERUS      Hx of precancerous cells?    INJECTION-STEROID-EPIDURAL-TRANSFORAMINAL Left 2015    Performed [facility] Rafita Booker [facility] at [facility] [place] PAIN MGT    TONSILLECTOMY      ULTRASOUND, UPPER GI TRACT, ENDOSCOPIC N/A 2019    Performed by Kev Calderon MD at [facility] ENDO (2ND FLR)       FAMILY HISTORY:  Family History   Adopted: Yes   Problem Relation Age of Onset    Amblyopia Neg Hx     Blindness Neg Hx     Cataracts Neg Hx     Glaucoma Neg Hx     Macular degeneration Neg Hx     Retinal detachment Neg Hx        SOCIAL HISTORY:  Social History     Tobacco Use    Smoking status: Former Smoker     Packs/day: 0.25     Years: 15.00     Pack years: 3.75     Last attempt to quit: 1982     Years since quittin.1    Smokeless tobacco: Never Used   Substance Use Topics    Alcohol use: Yes     Frequency: 2-4 times a month     Drinks per session: 1 or 2     Binge frequency: Never     Comment: occ    Drug use: No        REVIEW OF  SYSTEMS:  Review of Systems   Constitutional: Negative for activity change, chills, diaphoresis, fatigue and unexpected weight change.   HENT: Negative for congestion.    Respiratory: Negative for cough, choking, chest tightness and shortness of breath.    Cardiovascular: Negative for chest pain, palpitations and leg swelling.   Gastrointestinal: Negative for abdominal distention, constipation, diarrhea, nausea, rectal pain and vomiting.   Genitourinary: Negative for difficulty urinating, dysuria and frequency.   Musculoskeletal: Negative for arthralgias and neck pain.   Skin: Negative for color change and pallor.   Neurological: Negative for weakness and light-headedness.   Hematological: Negative for adenopathy. Does not bruise/bleed easily.         OBJECTIVE:     Most Recent Vitals:  Pulse: 74 (09/17/19 1132)  BP: (!) 156/81 (09/17/19 1132)      PHYSICAL EXAM:  Physical Exam   Constitutional: She is oriented to person, place, and time and well-developed, well-nourished, and in no distress. No distress.   HENT:   Head: Normocephalic.   Neck: Normal range of motion.   Cardiovascular: Normal rate and intact distal pulses.   Pulmonary/Chest: Effort normal. No respiratory distress.   Abdominal: Soft. She exhibits no distension.   Musculoskeletal: Normal range of motion.   Neurological: She is alert and oriented to person, place, and time. Gait normal.   Skin: Skin is warm. She is not diaphoretic.     LABORATORY VALUES:  Lab Results   Component Value Date    WBC 7.02 06/21/2019    HGB 11.3 (L) 06/21/2019    HCT 34.5 (L) 06/21/2019     06/21/2019    HGBA1C 5.4 06/21/2019     Lab Results   Component Value Date     08/05/2019    K 4.3 08/05/2019     08/05/2019    CO2 24 08/05/2019    BUN 24 (H) 08/05/2019    CREATININE 1.1 08/05/2019     (H) 08/05/2019    CALCIUM 10.1 08/05/2019    MG 2.5 09/07/2012    AST 27 08/05/2019    ALT 33 08/05/2019    ALKPHOS 78 08/05/2019    BILITOT 0.4 08/05/2019     BILIDIR 0.2 2012    PROT 7.3 2019    ALBUMIN 4.1 2019    LIPASE 5 2016     Lab Results   Component Value Date    INR 1.1 2012     Lab Results   Component Value Date    TSH 3.865 2019    FREET4 1.06 2018    .0 (H) 2019     DIAGNOSTIC STUDIES:    CT Neck Parathyroid :   Lymph nodes: Many normal size lymph nodes throughout.    Salivary glands: Normal.    Thyroid: Several small hypodense nodules, largest which measures 0.8 cm, no recommended follow-up based upon size guidelines. Single midline mediastinal lesion measuring 1.2 x 1.4 x 1.1 cm located near the left innominate vein correlating with nuclear medicine mediastinal lesion; finding shows bright arterial phase enhancement and washout.  This is suggestive for ectopic parathyroid adenoma. No other enhancing lesions identified.    Vascular structures: Left innominate vein stenosis around pacemaker electrodes.  Carotid and vertebral arteries patent bilaterally.    Cervical spine: Degenerative changes cervical spine most prominent at C5-C6 with posterior disc osteophyte and moderate narrowing of the spinal canal.    Other findings: Base of tongue lobular prominence likely representing lymphoid hyperplasia.    Visualized lungs: Apical scarring identified bilaterally, no significant lung nodules.      Impression       1.  Midline mediastinal single lesion consistent with ectopic parathyroid adenoma.    2.  C5-C6 degenerative disc disease with posterior disc osteophyte.    3.  Bilateral lung apical scarring, no significant lung nodules.         Parathyroid [id number]  Impression         Near complete washout from the thyroid gland without focal uptake to suggest parathyroid adenoma in the expected region of the parathyroid glands.    In the midline upper chest there is prominent uptake on early images with faint retention on delayed.  Finding is nonspecific and ectopic parathyroid tissue cannot be  definitively excluded.  Further evaluation with 4D contrast enhanced CT chest may be warranted if there is further clinical concern.      CT neck 4D  Impression         1.  Midline mediastinal single lesion consistent with ectopic parathyroid adenoma.     U/s soft tissue head and neck 9/11   FINDINGS:  Thyroid gland has homogeneous echotexture and normal vascularity.      RIGHT LOBE:    Right lobe of the thyroid measures 3.67 x 1.55 x 1.48 cm.    A subcentimeter colloid containing cyst is seen in the right superior pole.      LEFT LOBE:    Left lobe of the thyroid measures 3.76 x 1.31 x 1.19 cm.    A small subcentimter cyst is seen in the left mid lobe.    0.55 x 0.41 x 0.54 cm cystic mass in the left inferior pole of the thyroid.  It appears to be within the thyroid and may demonstrate polar artery flow.      ISTHMUS:    Isthmus measures 0.38 cm.    1.07 x 0.50 x 0.60 cm cystic nodule is seen in the isthmus.  The solid component of this nodule is slightly hypoechoic.  Margins are well-defined.  No microcalcifications are seen.  Vascularity is grade 1.      LYMPH NODES:    No abnormal lymph nodes seen.    COMPARISON:  No previous neck ultrsaounds are available for comparison.      Impression       1.)  Thyroid gland is normal in size with homogeneous echotexture and normal vascularity    2.) A subcentimeter colloid containing cyst is seen in the right superior pole    3.) A small subcentimter cyst is seen in the left mid lobe    4.) 1.07 x 0.50 x 0.60 cm cystic nodule is seen in the isthmus    5.)  0.55 x 0.41 x 0.54 cm cystic mass in the left inferior pole of the thyroid which most likely represents an thyroid cyst but cannot definitively exclude parathyroid adenoma or parathyroid cyst    RECOMMENDATIONS:  No thyroid nodules meeting FNA criteria.  Recommend correlation with nuclear medicine parathyroid imaging and parathyroid protocol CT if parathyroidectomy is being considered.         ASSESSMENT:     Manisha  Swathi Wynne is a 69 y.o. female with CKD III referred for hyperparathyroidism. Midline mediastinal ectopic parathyroid adenoma on CT parathyroid scan-however difficult to appreciate on imaging, but likely inferior to innominate vessel.     PLAN:  -Due to ectopic location of her parathyroid adenoma, which appears to be inferior to the cross over of the innominate vessel, she will likely need a mini sternotomy to reach it. This would require help from thoracic surgery. We will discuss imaging with radiology to better characterize location for surgical planning   -Will touch base with her endocrinologist Dr. Villarreal   -Will follow up with patient after discussion with Radiology and Clara Rand MD   General Surgery- PGYIII  [phone]    I have personally taken the history and examined this patient and agree with the resident's note as stated above.  The patient presents referred by Dr. Villarreal for primary hyperparathyroidism.  The patient has stage 3 chronic kidney disease and a history of a pacemaker for atrial fibrillation and 1st degree heart block.    She has a history of osteopenia and she has syncopal episodes as well and states that she just falls a lot and has had a broken nose from her history of falls.    Her calcium level runs in the 10.2-10.6 range.  Her last intact parathyroid hormone level was 146 with a normal vitamin-D level of 60.    Imaging on sestamibi scan and 4 D parathyroid CT scan reveals findings consistent with an ectopic parathyroid adenoma in the mediastinum.  It is supposedly best seen on image 242 on the CT scan which is inferior to the innominate artery crossing the trachea.  If this is the location of the ectopic parathyroid gland adenoma, she will likely require a sternal split for access to the parathyroid adenoma surgically for resection.  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    She certainly meets criteria for surgical resection and surgical correction  of her primary hyperparathyroidism because of her chronic kidney disease and compromised eGFR.    I told her I would like to review the images with CT Radiology and discuss the case with Dr. Villarreal before scheduling her for surgery and proceeding further as she will likely need a sternal split / sternotomy for access to the parathyroid gland adenoma.  I will call her and phone review further recommendations based on discussions with Radiology and Endocrinology.

## 2019-09-17 NOTE — LETTER
September 18, 2019      Cristine Villarreal MD  [address]  [facility] [address]           [facility]  [address]  [facility] [address]  Phone: [phone]  Fax: [phone]          Patient: Manisha Wynne   MR Number: [id number]   YOB: 1950   Date of Visit: 9/17/2019       Dear Dr. Cristine Villarreal:    Thank you for referring Manisha Wynne to me for evaluation. Attached you will find relevant portions of my assessment and plan of care.    If you have questions, please do not hesitate to call me. I look forward to following Manisha Wynne along with you.    Sincerely,    Martir Agee MD    Enclosure  CC:  No Recipients    If you would like to receive this communication electronically, please contact [email or web address] or [phone] to request more information on [employer] Link access.    For providers and/or their staff who would like to refer a patient to [facility], please contact us through our one-stop-shop provider referral line, [facility], at [phone].    If you feel you have received this communication in error or would no longer like to receive these types of communications, please e-mail [email or web address]

## 2019-09-22 ENCOUNTER — TELEPHONE (OUTPATIENT)
Dept: INTERNAL MEDICINE | Facility: CLINIC | Age: 69
End: 2019-09-22

## 2019-09-22 RX ORDER — LOSARTAN POTASSIUM 25 MG/1
25 TABLET ORAL DAILY
Qty: 30 TABLET | Refills: 3 | Status: SHIPPED | OUTPATIENT
Start: 2019-09-22 | End: 2020-02-03

## 2019-09-24 ENCOUNTER — PATIENT MESSAGE (OUTPATIENT)
Dept: PSYCHIATRY | Facility: CLINIC | Age: 69
End: 2019-09-24

## 2019-09-24 ENCOUNTER — OFFICE VISIT (OUTPATIENT)
Dept: PSYCHIATRY | Facility: CLINIC | Age: 69
End: 2019-09-24
Payer: MEDICARE

## 2019-09-24 DIAGNOSIS — F33.41 MAJOR DEPRESSIVE DISORDER, RECURRENT EPISODE, IN PARTIAL REMISSION: Primary | ICD-10-CM

## 2019-09-24 DIAGNOSIS — F98.8 ATTENTION DEFICIT DISORDER (ADD) IN ADULT: ICD-10-CM

## 2019-09-24 PROCEDURE — 99999 PR PBB SHADOW E&M-EST. PATIENT-LVL I: ICD-10-PCS | Mod: PBBFAC,HCNC,, | Performed by: SOCIAL WORKER

## 2019-09-24 PROCEDURE — 99499 RISK ADDL DX/OHS AUDIT: ICD-10-PCS | Mod: HCNC,S$GLB,, | Performed by: SOCIAL WORKER

## 2019-09-24 PROCEDURE — 99499 UNLISTED E&M SERVICE: CPT | Mod: HCNC,S$GLB,, | Performed by: SOCIAL WORKER

## 2019-09-24 PROCEDURE — 90834 PR PSYCHOTHERAPY W/PATIENT, 45 MIN: ICD-10-PCS | Mod: HCNC,S$GLB,, | Performed by: SOCIAL WORKER

## 2019-09-24 PROCEDURE — 99999 PR PBB SHADOW E&M-EST. PATIENT-LVL I: CPT | Mod: PBBFAC,HCNC,, | Performed by: SOCIAL WORKER

## 2019-09-24 PROCEDURE — 90834 PSYTX W PT 45 MINUTES: CPT | Mod: HCNC,S$GLB,, | Performed by: SOCIAL WORKER

## 2019-09-24 NOTE — PROGRESS NOTES
Individual Psychotherapy (PhD/LCSW)    9/24/2019    Site: [facility]    Therapeutic Intervention: Met with patient alone.  Outpatient - Insight oriented psychotherapy 45 min - CPT code 32776    Chief complaint/reason for encounter: depression, distractibility     Interval history and content of current session: Pt late, went to wrong location. She is doing field visits at work now, enjoys this but is getting behind in her notes and visits. Discuss time management issues and strategies, realistic planning to include commute time and time to write notes, and suggest she decrease caseload as needed to allow her to keep up. Pt is receptive.    Treatment plan:  · Target symptoms: depression, distractibility  · Why chosen therapy is appropriate versus another modality: relevant to diagnosis  · Outcome monitoring methods: self-report  · Therapeutic intervention type: insight oriented psychotherapy    Risk parameters:  Patient reports no suicidal ideation  Patient reports no homicidal ideation  Patient reports no self-injurious behavior  Patient reports no violent behavior    Verbal deficits: None    Patient's response to intervention:  The patient's response to intervention is motivated    Progress toward goals and other mental status changes:  The patient's progress toward goals is progressing slowly    Diagnosis: Major depression, recurrent, in partial remission,  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

## 2019-10-02 ENCOUNTER — PATIENT OUTREACH (OUTPATIENT)
Dept: ADMINISTRATIVE | Facility: OTHER | Age: 69
End: 2019-10-02

## 2019-10-03 ENCOUNTER — HOSPITAL ENCOUNTER (OUTPATIENT)
Dept: CARDIOLOGY | Facility: CLINIC | Age: 69
Discharge: HOME | End: 2019-10-03
Payer: MEDICARE

## 2019-10-03 ENCOUNTER — OFFICE VISIT (OUTPATIENT)
Dept: ELECTROPHYSIOLOGY | Facility: CLINIC | Age: 69
End: 2019-10-03
Payer: MEDICARE

## 2019-10-03 ENCOUNTER — CLINICAL SUPPORT (OUTPATIENT)
Dept: CARDIOLOGY | Facility: HOSPITAL | Age: 69
End: 2019-10-03
Attending: INTERNAL MEDICINE
Payer: MEDICARE

## 2019-10-03 VITALS
HEART RATE: 59 BPM | HEIGHT: 68 IN | SYSTOLIC BLOOD PRESSURE: 130 MMHG | DIASTOLIC BLOOD PRESSURE: 67 MMHG | WEIGHT: 158.75 LBS | BODY MASS INDEX: 24.06 KG/M2

## 2019-10-03 DIAGNOSIS — Z95.0 CARDIAC PACEMAKER IN SITU: ICD-10-CM

## 2019-10-03 DIAGNOSIS — G47.33 OSA (OBSTRUCTIVE SLEEP APNEA): ICD-10-CM

## 2019-10-03 DIAGNOSIS — I10 ESSENTIAL HYPERTENSION: Chronic | ICD-10-CM

## 2019-10-03 DIAGNOSIS — I49.5 SSS (SICK SINUS SYNDROME): ICD-10-CM

## 2019-10-03 DIAGNOSIS — I49.5 SSS (SICK SINUS SYNDROME): Primary | Chronic | ICD-10-CM

## 2019-10-03 PROCEDURE — 1101F PT FALLS ASSESS-DOCD LE1/YR: CPT | Mod: HCNC,CPTII,S$GLB, | Performed by: INTERNAL MEDICINE

## 2019-10-03 PROCEDURE — 1101F PR PT FALLS ASSESS DOC 0-1 FALLS W/OUT INJ PAST YR: ICD-10-PCS | Mod: HCNC,CPTII,S$GLB, | Performed by: INTERNAL MEDICINE

## 2019-10-03 PROCEDURE — 93010 RHYTHM STRIP: ICD-10-PCS | Mod: HCNC,S$GLB,, | Performed by: INTERNAL MEDICINE

## 2019-10-03 PROCEDURE — 99999 PR PBB SHADOW E&M-EST. PATIENT-LVL III: CPT | Mod: PBBFAC,HCNC,, | Performed by: INTERNAL MEDICINE

## 2019-10-03 PROCEDURE — 93280 PM DEVICE PROGR EVAL DUAL: CPT | Mod: HCNC

## 2019-10-03 PROCEDURE — 93005 RHYTHM STRIP: ICD-10-PCS | Mod: HCNC,S$GLB,, | Performed by: INTERNAL MEDICINE

## 2019-10-03 PROCEDURE — 93010 ELECTROCARDIOGRAM REPORT: CPT | Mod: HCNC,S$GLB,, | Performed by: INTERNAL MEDICINE

## 2019-10-03 PROCEDURE — 99499 UNLISTED E&M SERVICE: CPT | Mod: HCNC,S$GLB,, | Performed by: INTERNAL MEDICINE

## 2019-10-03 PROCEDURE — 3075F SYST BP GE 130 - 139MM HG: CPT | Mod: HCNC,CPTII,S$GLB, | Performed by: INTERNAL MEDICINE

## 2019-10-03 PROCEDURE — 99999 PR PBB SHADOW E&M-EST. PATIENT-LVL III: ICD-10-PCS | Mod: PBBFAC,HCNC,, | Performed by: INTERNAL MEDICINE

## 2019-10-03 PROCEDURE — 93005 ELECTROCARDIOGRAM TRACING: CPT | Mod: HCNC,S$GLB,, | Performed by: INTERNAL MEDICINE

## 2019-10-03 PROCEDURE — 3078F PR MOST RECENT DIASTOLIC BLOOD PRESSURE < 80 MM HG: ICD-10-PCS | Mod: HCNC,CPTII,S$GLB, | Performed by: INTERNAL MEDICINE

## 2019-10-03 PROCEDURE — 99213 OFFICE O/P EST LOW 20 MIN: CPT | Mod: HCNC,S$GLB,, | Performed by: INTERNAL MEDICINE

## 2019-10-03 PROCEDURE — 99499 RISK ADDL DX/OHS AUDIT: ICD-10-PCS | Mod: HCNC,S$GLB,, | Performed by: INTERNAL MEDICINE

## 2019-10-03 PROCEDURE — 3078F DIAST BP <80 MM HG: CPT | Mod: HCNC,CPTII,S$GLB, | Performed by: INTERNAL MEDICINE

## 2019-10-03 PROCEDURE — 3075F PR MOST RECENT SYSTOLIC BLOOD PRESS GE 130-139MM HG: ICD-10-PCS | Mod: HCNC,CPTII,S$GLB, | Performed by: INTERNAL MEDICINE

## 2019-10-03 PROCEDURE — 99213 PR OFFICE/OUTPT VISIT, EST, LEVL III, 20-29 MIN: ICD-10-PCS | Mod: HCNC,S$GLB,, | Performed by: INTERNAL MEDICINE

## 2019-10-03 NOTE — PROGRESS NOTES
Ms. Wynne is a former patient of Dr. Whelan.      Subjective:   Patient ID:  Manisha Wynne is a 69 y.o. female who presents for follow-up of Pacemaker Check  .   Primary Care Physician: Iris Blue MD    Prior Hx:  I had the pleasure of seeing Ms. Wynne in our electrophysiology clinic in follow-up for her pacemaker. As you are aware she is a pleasant 68 year-old woman with sick sinus syndrome, syncope and high-grade infrahisian block s/p dcPPM (Adam, 9/2012), hypertension, and CEDRIC here for follow-up.     She saw Svetlana Miner in clinic 5/2019. She reported she had recently experienced a fall while in [place] for a conference. She dislocated her finger, but head CT was negative. She was feeling fatigued and lost her balance as she stepped off the curb. She did not lose consciousness.  Device interrogation showed no arrhythmic episodes during period of her fall. Her last ECHO 1/2019 noted normal LVEF.    Interim Hx:  She reports between January and May she had 3 falls. No syncope. She reports she has a sensory neuropathy and is falling with Neurology for this.    Device interrogation today notes battery longevity >3 years, A-paces 89% and R-paces 0%. Had 1 nonsustained AT for 6 seconds    ECG today notes sinus rhythm with long-first degree AVB and RBBB.      Review of Systems   Constitution: Negative for malaise/fatigue.   Cardiovascular: Negative for chest pain, dyspnea on exertion, irregular heartbeat, leg swelling and palpitations.   Respiratory: Negative for shortness of breath.    Hematologic/Lymphatic: Negative for bleeding problem.   Skin: Negative for rash.   Musculoskeletal: Positive for falls. Negative for myalgias.   Gastrointestinal: Negative for hematemesis, hematochezia and nausea.   Genitourinary: Negative for hematuria.   Neurological: Positive for paresthesias. Negative for light-headedness.   Psychiatric/Behavioral: Negative for altered mental status.   Allergic/Immunologic:  Negative for persistent infections.     Objective:        Physical Exam   Constitutional: She is oriented to person, place, and time. She appears well-developed and well-nourished.   HENT:   Head: Normocephalic.   Nose: Nose normal.   Eyes: Pupils are equal, round, and reactive to light.   Cardiovascular: Normal rate, regular rhythm, S1 normal and S2 normal.   No murmur heard.  Pulses:       Radial pulses are 2+ on the right side, and 2+ on the left side.   Pulmonary/Chest: Breath sounds normal. No respiratory distress.   Device to LUCW.   Abdominal: Normal appearance.   Musculoskeletal: Normal range of motion. She exhibits no edema.   Neurological: She is alert and oriented to person, place, and time.   Skin: Skin is warm and dry. No erythema.   Psychiatric: She has a normal mood and affect. Her speech is normal and behavior is normal.   Nursing note and vitals reviewed.        Assessment:     1. SSS (sick sinus syndrome)    2. Essential hypertension    3. Cardiac pacemaker in situ    4. CEDRIC (obstructive sleep apnea)      Plan:     In summary, Ms. Wynne is a pleasant 69 year-old female sick sinus syndrome, syncope and high-grade infrahisian block s/p dcPPM (Adam, 9/2012), hypertension, and CEDRIC here for follow-up. Ms. Wynne is doing well from a device perspective with stable lead and device function. No significant arrhythmias noted. No arrhythmia during the periods of her falls, which she now thinks is related to a sensory neuropathy. No RV pacing. No CHF symptoms. She is doing well.    Continue current medication regimen and device settings.   Follow up in EP clinic in 1 year, sooner as needed.     Thank you for allowing me to participate in the care of this patient. Please do not hesitate to call me with any questions or concerns.    John Sheets MD, PhD  Cardiac Electrophysiology

## 2019-10-04 ENCOUNTER — IMMUNIZATION (OUTPATIENT)
Dept: PHARMACY | Facility: CLINIC | Age: 69
End: 2019-10-04
Payer: MEDICARE

## 2019-10-07 ENCOUNTER — OFFICE VISIT (OUTPATIENT)
Dept: PSYCHIATRY | Facility: CLINIC | Age: 69
End: 2019-10-07
Payer: MEDICARE

## 2019-10-07 DIAGNOSIS — F98.8 ATTENTION DEFICIT DISORDER (ADD) IN ADULT: ICD-10-CM

## 2019-10-07 DIAGNOSIS — F33.41 MAJOR DEPRESSIVE DISORDER, RECURRENT EPISODE, IN PARTIAL REMISSION: Primary | ICD-10-CM

## 2019-10-07 PROCEDURE — 90834 PSYTX W PT 45 MINUTES: CPT | Mod: HCNC,S$GLB,, | Performed by: SOCIAL WORKER

## 2019-10-07 PROCEDURE — 99499 UNLISTED E&M SERVICE: CPT | Mod: HCNC,S$GLB,, | Performed by: SOCIAL WORKER

## 2019-10-07 PROCEDURE — 99499 RISK ADDL DX/OHS AUDIT: ICD-10-PCS | Mod: HCNC,S$GLB,, | Performed by: SOCIAL WORKER

## 2019-10-07 PROCEDURE — 99999 PR PBB SHADOW E&M-EST. PATIENT-LVL I: ICD-10-PCS | Mod: PBBFAC,HCNC,, | Performed by: SOCIAL WORKER

## 2019-10-07 PROCEDURE — 90834 PR PSYCHOTHERAPY W/PATIENT, 45 MIN: ICD-10-PCS | Mod: HCNC,S$GLB,, | Performed by: SOCIAL WORKER

## 2019-10-07 PROCEDURE — 99999 PR PBB SHADOW E&M-EST. PATIENT-LVL I: CPT | Mod: PBBFAC,HCNC,, | Performed by: SOCIAL WORKER

## 2019-10-07 NOTE — PROGRESS NOTES
Individual Psychotherapy (PhD/LCSW)    10/7/2019    Site: [facility]    Therapeutic Intervention: Met with patient alone.  Outpatient - Insight oriented psychotherapy 45 min - CPT code 53706    Chief complaint/reason for encounter: depression, distractibility     Interval history and content of current session: Pt on time, but notes she is behind in her charting. Continue to work on time management. Pt has difficulty being realistic in how long things take and how much she is able to accomplish in a day, then feels frustrated with herself. Pt also feels lack of achievement in her career, compares herself negatively to others. Challenge pt to affirm the good things she does and the good she has in her life. Pt is able to see these when pointed out but habitually looks for negatives.    Treatment plan:  · Target symptoms: depression, distractibility  · Why chosen therapy is appropriate versus another modality: relevant to diagnosis  · Outcome monitoring methods: self-report  · Therapeutic intervention type: insight oriented psychotherapy    Risk parameters:  Patient reports no suicidal ideation  Patient reports no homicidal ideation  Patient reports no self-injurious behavior  Patient reports no violent behavior    Verbal deficits: None    Patient's response to intervention:  The patient's response to intervention is motivated    Progress toward goals and other mental status changes:  The patient's progress toward goals is progressing slowly    Diagnosis: Major depression, recurrent, in partial remission,  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

## 2019-10-09 ENCOUNTER — OFFICE VISIT (OUTPATIENT)
Dept: URGENT CARE | Facility: CLINIC | Age: 69
End: 2019-10-09
Payer: MEDICARE

## 2019-10-09 VITALS
OXYGEN SATURATION: 99 % | RESPIRATION RATE: 18 BRPM | WEIGHT: 158 LBS | SYSTOLIC BLOOD PRESSURE: 153 MMHG | DIASTOLIC BLOOD PRESSURE: 89 MMHG | TEMPERATURE: 98 F | HEIGHT: 68 IN | HEART RATE: 68 BPM | BODY MASS INDEX: 23.95 KG/M2

## 2019-10-09 DIAGNOSIS — J32.9 BACTERIAL SINUSITIS: Primary | ICD-10-CM

## 2019-10-09 DIAGNOSIS — I10 ESSENTIAL HYPERTENSION: ICD-10-CM

## 2019-10-09 DIAGNOSIS — B96.89 BACTERIAL SINUSITIS: Primary | ICD-10-CM

## 2019-10-09 DIAGNOSIS — R09.82 POST-NASAL DRIP: ICD-10-CM

## 2019-10-09 PROCEDURE — 3079F PR MOST RECENT DIASTOLIC BLOOD PRESSURE 80-89 MM HG: ICD-10-PCS | Mod: CPTII,S$GLB,, | Performed by: NURSE PRACTITIONER

## 2019-10-09 PROCEDURE — 1101F PT FALLS ASSESS-DOCD LE1/YR: CPT | Mod: CPTII,S$GLB,, | Performed by: NURSE PRACTITIONER

## 2019-10-09 PROCEDURE — 3077F SYST BP >= 140 MM HG: CPT | Mod: CPTII,S$GLB,, | Performed by: NURSE PRACTITIONER

## 2019-10-09 PROCEDURE — 3077F PR MOST RECENT SYSTOLIC BLOOD PRESSURE >= 140 MM HG: ICD-10-PCS | Mod: CPTII,S$GLB,, | Performed by: NURSE PRACTITIONER

## 2019-10-09 PROCEDURE — 3079F DIAST BP 80-89 MM HG: CPT | Mod: CPTII,S$GLB,, | Performed by: NURSE PRACTITIONER

## 2019-10-09 PROCEDURE — 99214 PR OFFICE/OUTPT VISIT, EST, LEVL IV, 30-39 MIN: ICD-10-PCS | Mod: S$GLB,,, | Performed by: NURSE PRACTITIONER

## 2019-10-09 PROCEDURE — 99214 OFFICE O/P EST MOD 30 MIN: CPT | Mod: S$GLB,,, | Performed by: NURSE PRACTITIONER

## 2019-10-09 PROCEDURE — 1101F PR PT FALLS ASSESS DOC 0-1 FALLS W/OUT INJ PAST YR: ICD-10-PCS | Mod: CPTII,S$GLB,, | Performed by: NURSE PRACTITIONER

## 2019-10-09 RX ORDER — AMOXICILLIN AND CLAVULANATE POTASSIUM 875; 125 MG/1; MG/1
1 TABLET, FILM COATED ORAL 2 TIMES DAILY
Qty: 20 TABLET | Refills: 0 | Status: SHIPPED | OUTPATIENT
Start: 2019-10-09 | End: 2019-10-19

## 2019-10-09 RX ORDER — FLUTICASONE PROPIONATE 50 MCG
2 SPRAY, SUSPENSION (ML) NASAL DAILY
Qty: 15.8 ML | Refills: 0 | Status: SHIPPED | OUTPATIENT
Start: 2019-10-09 | End: 2019-12-13 | Stop reason: CLARIF

## 2019-10-09 RX ORDER — GUAIFENESIN 600 MG/1
600 TABLET, EXTENDED RELEASE ORAL 2 TIMES DAILY
Qty: 30 TABLET | Refills: 0 | Status: SHIPPED | OUTPATIENT
Start: 2019-10-09 | End: 2019-10-24

## 2019-10-09 NOTE — PATIENT INSTRUCTIONS
If your condition worsens or fails to improve we recommend that you receive another evaluation at the emergency room immediately or contact your primary medical clinic to discuss your concerns.   You must understand that you have received an Urgent Care treatment only and that you may be released before all of your medical problems are known or treated. You, the patient, will arrange for follow up care as instructed.   Please drink plenty of fluids.  Please get plenty of rest.  Please return here or go to the Emergency Department for any concerns or worsening of condition.  If you were given wait & see antibiotics, please wait 3-5 days before taking them, and only take them if your symptoms have worsened or not improved.  If you do begin taking the antibiotics, please take them to completion.  If you were prescribed antibiotics, please take them to completion.  If you were prescribed a narcotic medication, do not drive or operate heavy equipment or machinery while taking these medications.  If you do not have Hypertension or any history of palpitations, it is ok to take over the counter Sudafed or Mucinex D or Allegra-D or Claritin-D or Zyrtec-D.  If you do take one of the above, it is ok to combine that with plain over the counter Mucinex or Allegra or Claritin or Zyrtec.  If for example you are taking Zyrtec -D, you can combine that with Mucinex, but not Mucinex-D.  If you are taking Mucinex-D, you can combine that with plain Allegra or Claritin or Zyrtec.   If you do have Hypertension or palpitations, it is safe to take Coricidin HBP for relief of sinus symptoms.  We recommend you take over the counter Flonase (Fluticasone) or another nasally inhaled steroid unless you are already taking one.  Nasal irrigation with a saline spray or Netti Pot like device per their directions is also recommended.  If not allergic, please take over the counter Tylenol (Acetaminophen) and/or Motrin (Ibuprofen) as directed for  control of pain and/or fever.  Please follow up with your primary care doctor or specialist as needed.    If you  smoke, please stop smoking.    Sinusitis (Antibiotic Treatment)    The sinuses are air-filled spaces within the bones of the face. They connect to the inside of the nose. Sinusitis is an inflammation of the tissue lining the sinus cavity. Sinus inflammation can occur during a cold. It can also be due to allergies to pollens and other particles in the air. Sinusitis can cause symptoms of sinus congestion and fullness. A sinus infection causes fever, headache and facial pain. There is often green or yellow drainage from the nose or into the back of the throat (post-nasal drip). You have been given antibiotics to treat this condition.  Home care:  · Take the full course of antibiotics as instructed. Do not stop taking them, even if you feel better.  · Drink plenty of water, hot tea, and other liquids. This may help thin mucus. It also may promote sinus drainage.  · Heat may help soothe painful areas of the face. Use a towel soaked in hot water. Or,  the shower and direct the hot spray onto your face. Using a vaporizer along with a menthol rub at night may also help.   · An expectorant containing guaifenesin may help thin the mucus and promote drainage from the sinuses.  · Over-the-counter decongestants may be used unless a similar medicine was prescribed. Nasal sprays work the fastest. Use one that contains phenylephrine or oxymetazoline. First blow the nose gently. Then use the spray. Do not use these medicines more often than directed on the label or symptoms may get worse. You may also use tablets containing pseudoephedrine. Avoid products that combine ingredients, because side effects may be increased. Read labels. You can also ask the pharmacist for help. (NOTE: Persons with high blood pressure should not use decongestants. They can raise blood pressure.)  · Over-the-counter antihistamines may  help if allergies contributed to your sinusitis.    · Do not use nasal rinses or irrigation during an acute sinus infection, unless told to by your health care provider. Rinsing may spread the infection to other sinuses.  · Use acetaminophen or ibuprofen to control pain, unless another pain medicine was prescribed. (If you have chronic liver or kidney disease or ever had a stomach ulcer, talk with your doctor before using these medicines. Aspirin should never be used in anyone under 18 years of age who is ill with a fever. It may cause severe liver damage.)  · Don't smoke. This can worsen symptoms.  Follow-up care  Follow up with your healthcare provider or our staff if you are not improving within the next week.  When to seek medical advice  Call your healthcare provider if any of these occur:  · Facial pain or headache becoming more severe  · Stiff neck  · Unusual drowsiness or confusion  · Swelling of the forehead or eyelids  · Vision problems, including blurred or double vision  · Fever of 100.4ºF (38ºC) or higher, or as directed by your healthcare provider  · Seizure  · Breathing problems  · Symptoms not resolving within 10 days  Date Last Reviewed: 4/13/2015  © 7699-6021 [employer]. [address] [place] [address], [place] [address]. All rights reserved. This information is not intended as a substitute for professional medical care. Always follow your healthcare professional's instructions.

## 2019-10-09 NOTE — PROGRESS NOTES
"Subjective:       Patient ID: Manisha Wynne is a 69 y.o. female.    Vitals:  height is 5' 8" (1.727 m) and weight is 71.7 kg (158 lb). Her temperature is 98.2 °F (36.8 °C). Her blood pressure is 153/89 (abnormal) and her pulse is 68. Her respiration is 18 and oxygen saturation is 99%.     Chief Complaint: Sinus Problem    Sore throat, headache, and sinus pain that started on Monday.  States she has been having facial and dental pain since Monday.    URI    This is a new problem. The current episode started in the past 7 days. The problem has been unchanged. There has been no fever. Associated symptoms include congestion, coughing, headaches, rhinorrhea, sinus pain, a sore throat and swollen glands. Pertinent negatives include no ear pain, nausea, rash, vomiting or wheezing. She has tried acetaminophen (EmergenC) for the symptoms. The treatment provided mild relief.       Constitution: Negative for chills, sweating, fatigue and fever.   HENT: Positive for congestion, sinus pain, sinus pressure and sore throat. Negative for ear pain and voice change.    Neck: Positive for painful lymph nodes.   Eyes: Negative for eye redness.   Respiratory: Positive for cough. Negative for chest tightness, sputum production, bloody sputum, COPD, shortness of breath, stridor, wheezing and asthma.    Gastrointestinal: Negative for nausea and vomiting.   Musculoskeletal: Negative for muscle ache.   Skin: Negative for rash.   Allergic/Immunologic: Negative for seasonal allergies and asthma.   Neurological: Positive for headaches.   Hematologic/Lymphatic: Positive for swollen lymph nodes.       Objective:      Physical Exam   Constitutional: She is oriented to person, place, and time. She appears well-developed and well-nourished. She is cooperative.  Non-toxic appearance. She does not have a sickly appearance. She does not appear ill. No distress.   Hypertensive in clinic   HENT:   Head: Normocephalic and atraumatic.   Right " Ear: Hearing, external ear and ear canal normal. A middle ear effusion is present.   Left Ear: Hearing, external ear and ear canal normal. A middle ear effusion is present.   Nose: Mucosal edema present. No rhinorrhea or nasal deformity. No epistaxis. Right sinus exhibits maxillary sinus tenderness. Right sinus exhibits no frontal sinus tenderness. Left sinus exhibits maxillary sinus tenderness. Left sinus exhibits no frontal sinus tenderness.   Mouth/Throat: Uvula is midline and mucous membranes are normal. No trismus in the jaw. Normal dentition. No uvula swelling. Posterior oropharyngeal erythema and cobblestoning present. No oropharyngeal exudate or posterior oropharyngeal edema.   Eyes: Pupils are equal, round, and reactive to light. Conjunctivae, EOM and lids are normal. No scleral icterus.   Neck: Trachea normal, normal range of motion, full passive range of motion without pain and phonation normal. Neck supple. Muscular tenderness present. No spinous process tenderness present. No neck rigidity. No edema, no erythema and normal range of motion present.   Cardiovascular: Normal rate, regular rhythm, normal heart sounds and normal pulses.   Pulmonary/Chest: Effort normal and breath sounds normal. No respiratory distress. She has no decreased breath sounds. She has no rhonchi.   Abdominal: Normal appearance.   Musculoskeletal: Normal range of motion. She exhibits no edema or deformity.   Lymphadenopathy:     She has cervical adenopathy.        Right cervical: Superficial cervical adenopathy present.        Left cervical: Superficial cervical adenopathy present.   Neurological: She is alert and oriented to person, place, and time. She exhibits normal muscle tone. Coordination normal.   Skin: Skin is warm, dry, intact, not diaphoretic and not pale.   Psychiatric: She has a normal mood and affect. Her speech is normal and behavior is normal. Judgment and thought content normal. Cognition and memory are normal.    Nursing note and vitals reviewed.        Assessment:       1. Bacterial sinusitis    2. Post-nasal drip    3. Essential hypertension        Plan:         Bacterial sinusitis  -     amoxicillin-clavulanate 875-125mg (AUGMENTIN) 875-125 mg per tablet; Take 1 tablet by mouth 2 (two) times daily. for 10 days  Dispense: 20 tablet; Refill: 0  -     fluticasone propionate (FLONASE) 50 mcg/actuation nasal spray; 2 sprays (100 mcg total) by Each Nostril route once daily.  Dispense: 15.8 mL; Refill: 0  -     guaiFENesin (MUCINEX) 600 mg 12 hr tablet; Take 1 tablet (600 mg total) by mouth 2 (two) times daily. for 15 days  Dispense: 30 tablet; Refill: 0    Post-nasal drip  -     fluticasone propionate (FLONASE) 50 mcg/actuation nasal spray; 2 sprays (100 mcg total) by Each Nostril route once daily.  Dispense: 15.8 mL; Refill: 0  -     guaiFENesin (MUCINEX) 600 mg 12 hr tablet; Take 1 tablet (600 mg total) by mouth 2 (two) times daily. for 15 days  Dispense: 30 tablet; Refill: 0    Essential hypertension         Patient Instructions     If your condition worsens or fails to improve we recommend that you receive another evaluation at the emergency room immediately or contact your primary medical clinic to discuss your concerns.   You must understand that you have received an Urgent Care treatment only and that you may be released before all of your medical problems are known or treated. You, the patient, will arrange for follow up care as instructed.   Please drink plenty of fluids.  Please get plenty of rest.  Please return here or go to the Emergency Department for any concerns or worsening of condition.  If you were given wait & see antibiotics, please wait 3-5 days before taking them, and only take them if your symptoms have worsened or not improved.  If you do begin taking the antibiotics, please take them to completion.  If you were prescribed antibiotics, please take them to completion.  If you were prescribed a narcotic  medication, do not drive or operate heavy equipment or machinery while taking these medications.  If you do not have Hypertension or any history of palpitations, it is ok to take over the counter Sudafed or Mucinex D or Allegra-D or Claritin-D or Zyrtec-D.  If you do take one of the above, it is ok to combine that with plain over the counter Mucinex or Allegra or Claritin or Zyrtec.  If for example you are taking Zyrtec -D, you can combine that with Mucinex, but not Mucinex-D.  If you are taking Mucinex-D, you can combine that with plain Allegra or Claritin or Zyrtec.   If you do have Hypertension or palpitations, it is safe to take Coricidin HBP for relief of sinus symptoms.  We recommend you take over the counter Flonase (Fluticasone) or another nasally inhaled steroid unless you are already taking one.  Nasal irrigation with a saline spray or Netti Pot like device per their directions is also recommended.  If not allergic, please take over the counter Tylenol (Acetaminophen) and/or Motrin (Ibuprofen) as directed for control of pain and/or fever.  Please follow up with your primary care doctor or specialist as needed.    If you  smoke, please stop smoking.    Sinusitis (Antibiotic Treatment)    The sinuses are air-filled spaces within the bones of the face. They connect to the inside of the nose. Sinusitis is an inflammation of the tissue lining the sinus cavity. Sinus inflammation can occur during a cold. It can also be due to allergies to pollens and other particles in the air. Sinusitis can cause symptoms of sinus congestion and fullness. A sinus infection causes fever, headache and facial pain. There is often green or yellow drainage from the nose or into the back of the throat (post-nasal drip). You have been given antibiotics to treat this condition.  Home care:  · Take the full course of antibiotics as instructed. Do not stop taking them, even if you feel better.  · Drink plenty of water, hot tea, and other  liquids. This may help thin mucus. It also may promote sinus drainage.  · Heat may help soothe painful areas of the face. Use a towel soaked in hot water. Or,  the shower and direct the hot spray onto your face. Using a vaporizer along with a menthol rub at night may also help.   · An expectorant containing guaifenesin may help thin the mucus and promote drainage from the sinuses.  · Over-the-counter decongestants may be used unless a similar medicine was prescribed. Nasal sprays work the fastest. Use one that contains phenylephrine or oxymetazoline. First blow the nose gently. Then use the spray. Do not use these medicines more often than directed on the label or symptoms may get worse. You may also use tablets containing pseudoephedrine. Avoid products that combine ingredients, because side effects may be increased. Read labels. You can also ask the pharmacist for help. (NOTE: Persons with high blood pressure should not use decongestants. They can raise blood pressure.)  · Over-the-counter antihistamines may help if allergies contributed to your sinusitis.    · Do not use nasal rinses or irrigation during an acute sinus infection, unless told to by your health care provider. Rinsing may spread the infection to other sinuses.  · Use acetaminophen or ibuprofen to control pain, unless another pain medicine was prescribed. (If you have chronic liver or kidney disease or ever had a stomach ulcer, talk with your doctor before using these medicines. Aspirin should never be used in anyone under 18 years of age who is ill with a fever. It may cause severe liver damage.)  · Don't smoke. This can worsen symptoms.  Follow-up care  Follow up with your healthcare provider or our staff if you are not improving within the next week.  When to seek medical advice  Call your healthcare provider if any of these occur:  · Facial pain or headache becoming more severe  · Stiff neck  · Unusual drowsiness or confusion  · Swelling  of the forehead or eyelids  · Vision problems, including blurred or double vision  · Fever of 100.4ºF (38ºC) or higher, or as directed by your healthcare provider  · Seizure  · Breathing problems  · Symptoms not resolving within 10 days  Date Last Reviewed: 4/13/2015  © 0584-0819 "[employer]. [address] [place] [address], [place] [address]. All rights reserved. This information is not intended as a substitute for professional medical care. Always follow your healthcare professional's instructions.

## 2019-10-13 DIAGNOSIS — I10 HYPERTENSION, ESSENTIAL: ICD-10-CM

## 2019-10-14 RX ORDER — METOPROLOL TARTRATE 25 MG/1
25 TABLET, FILM COATED ORAL 2 TIMES DAILY
Qty: 180 TABLET | Refills: 3 | Status: SHIPPED | OUTPATIENT
Start: 2019-10-14 | End: 2020-09-21 | Stop reason: SDUPTHER

## 2019-10-21 ENCOUNTER — OFFICE VISIT (OUTPATIENT)
Dept: PSYCHIATRY | Facility: CLINIC | Age: 69
End: 2019-10-21
Payer: MEDICARE

## 2019-10-21 DIAGNOSIS — F33.1 MAJOR DEPRESSIVE DISORDER, RECURRENT, MODERATE: Primary | ICD-10-CM

## 2019-10-21 DIAGNOSIS — F98.8 ATTENTION DEFICIT DISORDER (ADD) IN ADULT: ICD-10-CM

## 2019-10-21 PROCEDURE — 90834 PR PSYCHOTHERAPY W/PATIENT, 45 MIN: ICD-10-PCS | Mod: HCNC,S$GLB,, | Performed by: SOCIAL WORKER

## 2019-10-21 PROCEDURE — 90834 PSYTX W PT 45 MINUTES: CPT | Mod: HCNC,S$GLB,, | Performed by: SOCIAL WORKER

## 2019-10-21 PROCEDURE — 99999 PR PBB SHADOW E&M-EST. PATIENT-LVL I: CPT | Mod: PBBFAC,HCNC,, | Performed by: SOCIAL WORKER

## 2019-10-21 PROCEDURE — 99999 PR PBB SHADOW E&M-EST. PATIENT-LVL I: ICD-10-PCS | Mod: PBBFAC,HCNC,, | Performed by: SOCIAL WORKER

## 2019-10-21 NOTE — PROGRESS NOTES
Individual Psychotherapy (PhD/LCSW)    10/21/2019    Site: [facility]    Therapeutic Intervention: Met with patient alone.  Outpatient - Insight oriented psychotherapy 45 min - CPT code 78893    Chief complaint/reason for encounter: depression, distractibility     Interval history and content of current session: Pt depressed, tearful, fears being fired, can't get herself to get up or do her reports, lacks energy, not eating right, wearing same clothing x 3 days.  Pt isolating from friends, feels shame about not functioning well, feels overwhelmed. Pt denies suicidal ideation.  Ask pt to call her psychiatrist and consider change in medication and she agrees to do this tomorrow. Pt agrees to call friend Torrie rouse and confide in her, notes MAGGIE is the friend who helped her activate the last time she shut down. Pt is aware her fears are unrealistic but is unable to correct her thoughts. Pt may consider BMU. She is over-sleeping, and is eating breakfast but little else. Ask pt to make a point of eating more later in the day.    Treatment plan:  · Target symptoms: depression, distractibility  · Why chosen therapy is appropriate versus another modality: relevant to diagnosis  · Outcome monitoring methods: self-report  · Therapeutic intervention type: insight oriented psychotherapy    Risk parameters:  Patient reports no suicidal ideation  Patient reports no homicidal ideation  Patient reports no self-injurious behavior  Patient reports no violent behavior    Verbal deficits: None    Patient's response to intervention:  The patient's response to intervention is motivated    Progress toward goals and other mental status changes:  The patient's progress toward goals is poor    Diagnosis: Major depression, recurrent, moderate,  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

## 2019-11-04 ENCOUNTER — OFFICE VISIT (OUTPATIENT)
Dept: PSYCHIATRY | Facility: CLINIC | Age: 69
End: 2019-11-04
Payer: MEDICARE

## 2019-11-04 DIAGNOSIS — F98.8 ATTENTION DEFICIT DISORDER (ADD) IN ADULT: ICD-10-CM

## 2019-11-04 DIAGNOSIS — F33.41 MAJOR DEPRESSIVE DISORDER, RECURRENT EPISODE, IN PARTIAL REMISSION: Primary | ICD-10-CM

## 2019-11-04 PROCEDURE — 99999 PR PBB SHADOW E&M-EST. PATIENT-LVL I: CPT | Mod: PBBFAC,HCNC,, | Performed by: SOCIAL WORKER

## 2019-11-04 PROCEDURE — 99999 PR PBB SHADOW E&M-EST. PATIENT-LVL I: ICD-10-PCS | Mod: PBBFAC,HCNC,, | Performed by: SOCIAL WORKER

## 2019-11-04 PROCEDURE — 90834 PSYTX W PT 45 MINUTES: CPT | Mod: HCNC,S$GLB,, | Performed by: SOCIAL WORKER

## 2019-11-04 PROCEDURE — 90834 PR PSYCHOTHERAPY W/PATIENT, 45 MIN: ICD-10-PCS | Mod: HCNC,S$GLB,, | Performed by: SOCIAL WORKER

## 2019-11-11 ENCOUNTER — PATIENT OUTREACH (OUTPATIENT)
Dept: ADMINISTRATIVE | Facility: HOSPITAL | Age: 69
End: 2019-11-11

## 2019-11-13 ENCOUNTER — OFFICE VISIT (OUTPATIENT)
Dept: OTOLARYNGOLOGY | Facility: CLINIC | Age: 69
End: 2019-11-13
Payer: MEDICARE

## 2019-11-13 VITALS
DIASTOLIC BLOOD PRESSURE: 84 MMHG | BODY MASS INDEX: 24.03 KG/M2 | WEIGHT: 158.06 LBS | SYSTOLIC BLOOD PRESSURE: 149 MMHG | HEART RATE: 67 BPM

## 2019-11-13 DIAGNOSIS — J34.2 NASAL SEPTAL DEVIATION: ICD-10-CM

## 2019-11-13 DIAGNOSIS — J34.89 NASAL OBSTRUCTION: ICD-10-CM

## 2019-11-13 DIAGNOSIS — M95.0 NASAL DEFORMITY, ACQUIRED: Primary | ICD-10-CM

## 2019-11-13 PROCEDURE — 1101F PR PT FALLS ASSESS DOC 0-1 FALLS W/OUT INJ PAST YR: ICD-10-PCS | Mod: HCNC,CPTII,S$GLB, | Performed by: OTOLARYNGOLOGY

## 2019-11-13 PROCEDURE — 3077F PR MOST RECENT SYSTOLIC BLOOD PRESSURE >= 140 MM HG: ICD-10-PCS | Mod: HCNC,CPTII,S$GLB, | Performed by: OTOLARYNGOLOGY

## 2019-11-13 PROCEDURE — 3077F SYST BP >= 140 MM HG: CPT | Mod: HCNC,CPTII,S$GLB, | Performed by: OTOLARYNGOLOGY

## 2019-11-13 PROCEDURE — 3079F DIAST BP 80-89 MM HG: CPT | Mod: HCNC,CPTII,S$GLB, | Performed by: OTOLARYNGOLOGY

## 2019-11-13 PROCEDURE — 1101F PT FALLS ASSESS-DOCD LE1/YR: CPT | Mod: HCNC,CPTII,S$GLB, | Performed by: OTOLARYNGOLOGY

## 2019-11-13 PROCEDURE — 99999 PR PBB SHADOW E&M-EST. PATIENT-LVL III: CPT | Mod: PBBFAC,HCNC,, | Performed by: OTOLARYNGOLOGY

## 2019-11-13 PROCEDURE — 99999 PR PBB SHADOW E&M-EST. PATIENT-LVL III: ICD-10-PCS | Mod: PBBFAC,HCNC,, | Performed by: OTOLARYNGOLOGY

## 2019-11-13 PROCEDURE — 3079F PR MOST RECENT DIASTOLIC BLOOD PRESSURE 80-89 MM HG: ICD-10-PCS | Mod: HCNC,CPTII,S$GLB, | Performed by: OTOLARYNGOLOGY

## 2019-11-13 PROCEDURE — 99214 OFFICE O/P EST MOD 30 MIN: CPT | Mod: HCNC,S$GLB,, | Performed by: OTOLARYNGOLOGY

## 2019-11-13 PROCEDURE — 99214 PR OFFICE/OUTPT VISIT, EST, LEVL IV, 30-39 MIN: ICD-10-PCS | Mod: HCNC,S$GLB,, | Performed by: OTOLARYNGOLOGY

## 2019-11-14 ENCOUNTER — TELEPHONE (OUTPATIENT)
Dept: OTOLARYNGOLOGY | Facility: CLINIC | Age: 69
End: 2019-11-14

## 2019-11-14 ENCOUNTER — PATIENT MESSAGE (OUTPATIENT)
Dept: SURGERY | Facility: CLINIC | Age: 69
End: 2019-11-14

## 2019-11-14 DIAGNOSIS — Z01.818 PRE-OP TESTING: Primary | ICD-10-CM

## 2019-11-14 DIAGNOSIS — J34.3 NASAL TURBINATE HYPERTROPHY: ICD-10-CM

## 2019-11-14 DIAGNOSIS — J34.2 NASAL SEPTAL DEVIATION: ICD-10-CM

## 2019-11-14 DIAGNOSIS — J34.89 NASAL OBSTRUCTION: ICD-10-CM

## 2019-11-14 DIAGNOSIS — M95.0 NASAL DEFORMITY, ACQUIRED: ICD-10-CM

## 2019-11-14 NOTE — PROGRESS NOTES
Ms. Wynne is a 69-year-old white female  who was last seen by me a 9 months ago.  At that time she had a sustained a fall striking her nose creating nasal deformity and nasal obstruction.  On her pertinent physical exam her external nose still shows marked external valve collapse with mild inspiration and a positive Loida maneuver.  Intranasally she has a marked septal deviation to the left with 2+ turbinates.  All of these deformities together creating a 95% obstruction.  She does carry a diagnosis of obstructive sleep apnea but has had significant difficulty with her CPAP as her nasal airway is markedly obstructed.  As at her last visit we discussed surgical correction consisting of nasal reconstruction with bilateral auricular cartilage Batten grafts, septoplasty and submucous resection of turbinates.  She is at a point now where her nasal obstruction it is becoming a significant difficulty for her and she wishes to proceed with surgical intervention.  We will set this up for her at her convenience.

## 2019-11-18 ENCOUNTER — PATIENT MESSAGE (OUTPATIENT)
Dept: PSYCHIATRY | Facility: CLINIC | Age: 69
End: 2019-11-18

## 2019-11-18 ENCOUNTER — OFFICE VISIT (OUTPATIENT)
Dept: PSYCHIATRY | Facility: CLINIC | Age: 69
End: 2019-11-18
Payer: MEDICARE

## 2019-11-18 DIAGNOSIS — F98.8 ATTENTION DEFICIT DISORDER (ADD) IN ADULT: ICD-10-CM

## 2019-11-18 DIAGNOSIS — F33.41 MAJOR DEPRESSIVE DISORDER, RECURRENT EPISODE, IN PARTIAL REMISSION: Primary | ICD-10-CM

## 2019-11-18 PROCEDURE — 90834 PSYTX W PT 45 MINUTES: CPT | Mod: HCNC,S$GLB,, | Performed by: SOCIAL WORKER

## 2019-11-18 PROCEDURE — 90834 PR PSYCHOTHERAPY W/PATIENT, 45 MIN: ICD-10-PCS | Mod: HCNC,S$GLB,, | Performed by: SOCIAL WORKER

## 2019-11-18 NOTE — PROGRESS NOTES
Individual Psychotherapy (PhD/LCSW)    11/18/2019    Site: [facility]    Therapeutic Intervention: Met with patient alone.  Outpatient - Insight oriented psychotherapy 45 min - CPT code 69097    Chief complaint/reason for encounter: depression, distractibility     Interval history and content of current session: Pt 20 minutes late. She is activating in taking care of some medical issues, has sinus surgery scheduled for 12/18 so she will be able to use her C-PAP again. She is trying to get up out of bed in the mornings even if she is not going to work, to get things done around the house. She bought walking sticks and uses them for balance at times, hopes to walk in the park for exercise. She will f/u with her psychiatrist in December, still feels somewhat depressed.    Treatment plan:  · Target symptoms: depression, distractibility  · Why chosen therapy is appropriate versus another modality: relevant to diagnosis  · Outcome monitoring methods: self-report  · Therapeutic intervention type: insight oriented psychotherapy    Risk parameters:  Patient reports no suicidal ideation  Patient reports no homicidal ideation  Patient reports no self-injurious behavior  Patient reports no violent behavior    Verbal deficits: None    Patient's response to intervention:  The patient's response to intervention is motivated    Progress toward goals and other mental status changes:  The patient's progress toward goals is fair     Diagnosis: Major depression, recurrent, in partial remission,  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

## 2019-11-19 ENCOUNTER — TELEPHONE (OUTPATIENT)
Dept: CARDIOLOGY | Facility: CLINIC | Age: 69
End: 2019-11-19

## 2019-11-19 DIAGNOSIS — I10 ESSENTIAL HYPERTENSION: Primary | ICD-10-CM

## 2019-11-19 DIAGNOSIS — N18.30 CKD (CHRONIC KIDNEY DISEASE) STAGE 3, GFR 30-59 ML/MIN: ICD-10-CM

## 2019-11-19 NOTE — TELEPHONE ENCOUNTER
----- Message from Tonia Case sent at 11/19/2019 10:57 AM CST -----  Contact: pt  Pt is requesting to speak to you about scheduling her f/u appt and can be reached at [phone].    Thank you

## 2019-11-19 NOTE — TELEPHONE ENCOUNTER
Pt stated that she saw Dr. Sheets 10/2019 and wants to see Dr. Comer 4/2020(6months after seeing Dr. Sheets).

## 2019-11-25 ENCOUNTER — OFFICE VISIT (OUTPATIENT)
Dept: INTERNAL MEDICINE | Facility: CLINIC | Age: 69
End: 2019-11-25
Payer: MEDICARE

## 2019-11-25 VITALS
SYSTOLIC BLOOD PRESSURE: 112 MMHG | BODY MASS INDEX: 24.39 KG/M2 | OXYGEN SATURATION: 98 % | TEMPERATURE: 98 F | HEIGHT: 68 IN | WEIGHT: 160.94 LBS | DIASTOLIC BLOOD PRESSURE: 64 MMHG | HEART RATE: 70 BPM

## 2019-11-25 DIAGNOSIS — I10 ESSENTIAL HYPERTENSION: Chronic | ICD-10-CM

## 2019-11-25 DIAGNOSIS — Z01.810 PREOP CARDIOVASCULAR EXAM: ICD-10-CM

## 2019-11-25 DIAGNOSIS — E83.52 HYPERCALCEMIA: Primary | ICD-10-CM

## 2019-11-25 DIAGNOSIS — R79.1 ABNORMAL COAGULATION PROFILE: ICD-10-CM

## 2019-11-25 DIAGNOSIS — E78.5 HYPERLIPIDEMIA, UNSPECIFIED HYPERLIPIDEMIA TYPE: ICD-10-CM

## 2019-11-25 DIAGNOSIS — D64.9 ANEMIA, UNSPECIFIED TYPE: ICD-10-CM

## 2019-11-25 DIAGNOSIS — R73.9 HYPERGLYCEMIA: ICD-10-CM

## 2019-11-25 PROCEDURE — 1159F MED LIST DOCD IN RCRD: CPT | Mod: HCNC,S$GLB,, | Performed by: INTERNAL MEDICINE

## 2019-11-25 PROCEDURE — 1101F PT FALLS ASSESS-DOCD LE1/YR: CPT | Mod: HCNC,CPTII,S$GLB, | Performed by: INTERNAL MEDICINE

## 2019-11-25 PROCEDURE — 1159F PR MEDICATION LIST DOCUMENTED IN MEDICAL RECORD: ICD-10-PCS | Mod: HCNC,S$GLB,, | Performed by: INTERNAL MEDICINE

## 2019-11-25 PROCEDURE — 1126F PR PAIN SEVERITY QUANTIFIED, NO PAIN PRESENT: ICD-10-PCS | Mod: HCNC,S$GLB,, | Performed by: INTERNAL MEDICINE

## 2019-11-25 PROCEDURE — 1101F PR PT FALLS ASSESS DOC 0-1 FALLS W/OUT INJ PAST YR: ICD-10-PCS | Mod: HCNC,CPTII,S$GLB, | Performed by: INTERNAL MEDICINE

## 2019-11-25 PROCEDURE — 99214 PR OFFICE/OUTPT VISIT, EST, LEVL IV, 30-39 MIN: ICD-10-PCS | Mod: HCNC,S$GLB,, | Performed by: INTERNAL MEDICINE

## 2019-11-25 PROCEDURE — 3074F PR MOST RECENT SYSTOLIC BLOOD PRESSURE < 130 MM HG: ICD-10-PCS | Mod: HCNC,CPTII,S$GLB, | Performed by: INTERNAL MEDICINE

## 2019-11-25 PROCEDURE — 93005 EKG 12-LEAD: ICD-10-PCS | Mod: HCNC,S$GLB,, | Performed by: INTERNAL MEDICINE

## 2019-11-25 PROCEDURE — 93010 EKG 12-LEAD: ICD-10-PCS | Mod: HCNC,S$GLB,, | Performed by: INTERNAL MEDICINE

## 2019-11-25 PROCEDURE — 3078F PR MOST RECENT DIASTOLIC BLOOD PRESSURE < 80 MM HG: ICD-10-PCS | Mod: HCNC,CPTII,S$GLB, | Performed by: INTERNAL MEDICINE

## 2019-11-25 PROCEDURE — 93005 ELECTROCARDIOGRAM TRACING: CPT | Mod: HCNC,S$GLB,, | Performed by: INTERNAL MEDICINE

## 2019-11-25 PROCEDURE — 1126F AMNT PAIN NOTED NONE PRSNT: CPT | Mod: HCNC,S$GLB,, | Performed by: INTERNAL MEDICINE

## 2019-11-25 PROCEDURE — 99999 PR PBB SHADOW E&M-EST. PATIENT-LVL V: ICD-10-PCS | Mod: PBBFAC,HCNC,, | Performed by: INTERNAL MEDICINE

## 2019-11-25 PROCEDURE — 99999 PR PBB SHADOW E&M-EST. PATIENT-LVL V: CPT | Mod: PBBFAC,HCNC,, | Performed by: INTERNAL MEDICINE

## 2019-11-25 PROCEDURE — 3078F DIAST BP <80 MM HG: CPT | Mod: HCNC,CPTII,S$GLB, | Performed by: INTERNAL MEDICINE

## 2019-11-25 PROCEDURE — 3074F SYST BP LT 130 MM HG: CPT | Mod: HCNC,CPTII,S$GLB, | Performed by: INTERNAL MEDICINE

## 2019-11-25 PROCEDURE — 93010 ELECTROCARDIOGRAM REPORT: CPT | Mod: HCNC,S$GLB,, | Performed by: INTERNAL MEDICINE

## 2019-11-25 PROCEDURE — 99214 OFFICE O/P EST MOD 30 MIN: CPT | Mod: HCNC,S$GLB,, | Performed by: INTERNAL MEDICINE

## 2019-11-29 ENCOUNTER — LAB VISIT (OUTPATIENT)
Dept: LAB | Facility: HOSPITAL | Age: 69
End: 2019-11-29
Attending: INTERNAL MEDICINE
Payer: MEDICARE

## 2019-11-29 DIAGNOSIS — I10 ESSENTIAL HYPERTENSION: Chronic | ICD-10-CM

## 2019-11-29 DIAGNOSIS — R79.1 ABNORMAL COAGULATION PROFILE: ICD-10-CM

## 2019-11-29 DIAGNOSIS — D64.9 ANEMIA, UNSPECIFIED TYPE: ICD-10-CM

## 2019-11-29 DIAGNOSIS — R73.9 HYPERGLYCEMIA: ICD-10-CM

## 2019-11-29 DIAGNOSIS — E78.5 HYPERLIPIDEMIA, UNSPECIFIED HYPERLIPIDEMIA TYPE: ICD-10-CM

## 2019-11-29 DIAGNOSIS — Z01.810 PREOP CARDIOVASCULAR EXAM: ICD-10-CM

## 2019-11-29 DIAGNOSIS — E83.52 HYPERCALCEMIA: ICD-10-CM

## 2019-11-29 LAB
25(OH)D3+25(OH)D2 SERPL-MCNC: 69 NG/ML (ref 30–96)
ALBUMIN SERPL BCP-MCNC: 4.1 G/DL (ref 3.5–5.2)
ALP SERPL-CCNC: 97 U/L (ref 55–135)
ALT SERPL W/O P-5'-P-CCNC: 69 U/L (ref 10–44)
ANION GAP SERPL CALC-SCNC: 7 MMOL/L (ref 8–16)
APTT BLDCRRT: 26.7 SEC (ref 21–32)
AST SERPL-CCNC: 51 U/L (ref 10–40)
BASOPHILS # BLD AUTO: 0.03 K/UL (ref 0–0.2)
BASOPHILS NFR BLD: 0.5 % (ref 0–1.9)
BILIRUB SERPL-MCNC: 0.8 MG/DL (ref 0.1–1)
BUN SERPL-MCNC: 18 MG/DL (ref 8–23)
CALCIUM SERPL-MCNC: 10.5 MG/DL (ref 8.7–10.5)
CHLORIDE SERPL-SCNC: 102 MMOL/L (ref 95–110)
CHOLEST SERPL-MCNC: 215 MG/DL (ref 120–199)
CHOLEST/HDLC SERPL: 2.6 {RATIO} (ref 2–5)
CO2 SERPL-SCNC: 29 MMOL/L (ref 23–29)
CREAT SERPL-MCNC: 1 MG/DL (ref 0.5–1.4)
DIFFERENTIAL METHOD: ABNORMAL
EOSINOPHIL # BLD AUTO: 0.3 K/UL (ref 0–0.5)
EOSINOPHIL NFR BLD: 4.5 % (ref 0–8)
ERYTHROCYTE [DISTWIDTH] IN BLOOD BY AUTOMATED COUNT: 12.9 % (ref 11.5–14.5)
EST. GFR  (AFRICAN AMERICAN): >60 ML/MIN/1.73 M^2
EST. GFR  (NON AFRICAN AMERICAN): 56.8 ML/MIN/1.73 M^2
ESTIMATED AVG GLUCOSE: 103 MG/DL (ref 68–131)
FERRITIN SERPL-MCNC: 120 NG/ML (ref 20–300)
GLUCOSE SERPL-MCNC: 92 MG/DL (ref 70–110)
HBA1C MFR BLD HPLC: 5.2 % (ref 4–5.6)
HCT VFR BLD AUTO: 34.9 % (ref 37–48.5)
HDLC SERPL-MCNC: 82 MG/DL (ref 40–75)
HDLC SERPL: 38.1 % (ref 20–50)
HGB BLD-MCNC: 11.9 G/DL (ref 12–16)
INR PPP: 1 (ref 0.8–1.2)
IRON SERPL-MCNC: 61 UG/DL (ref 30–160)
LDLC SERPL CALC-MCNC: 119 MG/DL (ref 63–159)
LYMPHOCYTES # BLD AUTO: 1.7 K/UL (ref 1–4.8)
LYMPHOCYTES NFR BLD: 26.2 % (ref 18–48)
MCH RBC QN AUTO: 30.4 PG (ref 27–31)
MCHC RBC AUTO-ENTMCNC: 34.1 G/DL (ref 32–36)
MCV RBC AUTO: 89 FL (ref 82–98)
MONOCYTES # BLD AUTO: 0.8 K/UL (ref 0.3–1)
MONOCYTES NFR BLD: 12.3 % (ref 4–15)
NEUTROPHILS # BLD AUTO: 3.7 K/UL (ref 1.8–7.7)
NEUTROPHILS NFR BLD: 56.5 % (ref 38–73)
NONHDLC SERPL-MCNC: 133 MG/DL
PLATELET # BLD AUTO: 182 K/UL (ref 150–350)
PMV BLD AUTO: 10.2 FL (ref 9.2–12.9)
POTASSIUM SERPL-SCNC: 4.5 MMOL/L (ref 3.5–5.1)
PROT SERPL-MCNC: 7.4 G/DL (ref 6–8.4)
PROTHROMBIN TIME: 10.3 SEC (ref 9–12.5)
PTH-INTACT SERPL-MCNC: 55 PG/ML (ref 9–77)
RBC # BLD AUTO: 3.91 M/UL (ref 4–5.4)
SATURATED IRON: 17 % (ref 20–50)
SODIUM SERPL-SCNC: 138 MMOL/L (ref 136–145)
TOTAL IRON BINDING CAPACITY: 349 UG/DL (ref 250–450)
TRANSFERRIN SERPL-MCNC: 236 MG/DL (ref 200–375)
TRIGL SERPL-MCNC: 70 MG/DL (ref 30–150)
TSH SERPL DL<=0.005 MIU/L-ACNC: 3.08 UIU/ML (ref 0.4–4)
WBC # BLD AUTO: 6.6 K/UL (ref 3.9–12.7)

## 2019-11-29 PROCEDURE — 85025 COMPLETE CBC W/AUTO DIFF WBC: CPT | Mod: HCNC

## 2019-11-29 PROCEDURE — 82728 ASSAY OF FERRITIN: CPT | Mod: HCNC

## 2019-11-29 PROCEDURE — 83036 HEMOGLOBIN GLYCOSYLATED A1C: CPT | Mod: HCNC

## 2019-11-29 PROCEDURE — 83970 ASSAY OF PARATHORMONE: CPT | Mod: HCNC

## 2019-11-29 PROCEDURE — 82306 VITAMIN D 25 HYDROXY: CPT | Mod: HCNC

## 2019-11-29 PROCEDURE — 83540 ASSAY OF IRON: CPT | Mod: HCNC

## 2019-11-29 PROCEDURE — 85610 PROTHROMBIN TIME: CPT | Mod: HCNC

## 2019-11-29 PROCEDURE — 36415 COLL VENOUS BLD VENIPUNCTURE: CPT | Mod: HCNC

## 2019-11-29 PROCEDURE — 80053 COMPREHEN METABOLIC PANEL: CPT | Mod: HCNC

## 2019-11-29 PROCEDURE — 84443 ASSAY THYROID STIM HORMONE: CPT | Mod: HCNC

## 2019-11-29 PROCEDURE — 80061 LIPID PANEL: CPT | Mod: HCNC

## 2019-11-29 PROCEDURE — 85730 THROMBOPLASTIN TIME PARTIAL: CPT | Mod: HCNC

## 2019-12-02 ENCOUNTER — OFFICE VISIT (OUTPATIENT)
Dept: PSYCHIATRY | Facility: CLINIC | Age: 69
End: 2019-12-02
Payer: MEDICARE

## 2019-12-02 DIAGNOSIS — F33.41 MAJOR DEPRESSIVE DISORDER, RECURRENT EPISODE, IN PARTIAL REMISSION: Primary | ICD-10-CM

## 2019-12-02 DIAGNOSIS — F98.8 ATTENTION DEFICIT DISORDER (ADD) IN ADULT: ICD-10-CM

## 2019-12-02 PROCEDURE — 99999 PR PBB SHADOW E&M-EST. PATIENT-LVL I: CPT | Mod: PBBFAC,HCNC,, | Performed by: SOCIAL WORKER

## 2019-12-02 PROCEDURE — 90834 PSYTX W PT 45 MINUTES: CPT | Mod: HCNC,S$GLB,, | Performed by: SOCIAL WORKER

## 2019-12-02 PROCEDURE — 99999 PR PBB SHADOW E&M-EST. PATIENT-LVL I: ICD-10-PCS | Mod: PBBFAC,HCNC,, | Performed by: SOCIAL WORKER

## 2019-12-02 PROCEDURE — 90834 PR PSYCHOTHERAPY W/PATIENT, 45 MIN: ICD-10-PCS | Mod: HCNC,S$GLB,, | Performed by: SOCIAL WORKER

## 2019-12-02 NOTE — PROGRESS NOTES
Subjective:       Patient ID: Manisha Wynne is a 69 y.o. female.    Chief Complaint: Follow-up    HPIPT having ENT surgery for her nose in a few weeks.  She has had no falls - were most likely due to peripheral neuropathy, no cardiac etiology.  No CP or SOB.  No new GI issues - weight loss has stabilized. Pt denies any problems with anesthesia in the past. Pt reports no history of bleeding diathesis, no significant neck DJD, and no steroids in the last year.  Review of Systems   Constitutional: Negative for activity change and unexpected weight change.   HENT: Positive for hearing loss. Negative for rhinorrhea and trouble swallowing.    Eyes: Negative for discharge and visual disturbance.   Respiratory: Negative for chest tightness, shortness of breath (PND or orthopnea) and wheezing.    Cardiovascular: Negative for chest pain and palpitations.   Gastrointestinal: Negative for abdominal pain, blood in stool, constipation, diarrhea, nausea and vomiting.   Endocrine: Negative for polydipsia and polyuria.   Genitourinary: Negative for difficulty urinating, dysuria, hematuria and menstrual problem.   Musculoskeletal: Negative for arthralgias, joint swelling and neck pain.   Neurological: Positive for weakness. Negative for seizures, syncope and headaches.   Psychiatric/Behavioral: Negative for confusion and dysphoric mood.       Objective:      Physical Exam   Constitutional: She is oriented to person, place, and time. She appears well-developed and well-nourished. No distress.   HENT:   Head: Normocephalic.   Mouth/Throat: Oropharynx is clear and moist.   Neck: Neck supple. No JVD present. No thyromegaly present.   Cardiovascular: Normal rate, regular rhythm, normal heart sounds and intact distal pulses. Exam reveals no gallop and no friction rub.   No murmur heard.  Pulmonary/Chest: Effort normal and breath sounds normal. She has no wheezes. She has no rales.   Abdominal: Soft. Bowel sounds are normal. She  exhibits no distension and no mass. There is no tenderness. There is no rebound and no guarding.   Musculoskeletal: She exhibits no edema.   Lymphadenopathy:     She has no cervical adenopathy.   Neurological: She is alert and oriented to person, place, and time. She has normal reflexes.   Skin: Skin is warm and dry.   Psychiatric: She has a normal mood and affect. Her behavior is normal. Judgment and thought content normal.       Assessment:       1. Hypercalcemia    2. Anemia, unspecified type    3. Hyperglycemia    4. Essential hypertension    5. Hyperlipidemia, unspecified hyperlipidemia type    6. Preop cardiovascular exam    7. Abnormal coagulation profile         Plan:   Hypercalcemia  -     CBC auto differential; Future; Expected date: 11/25/2019  -     Comprehensive metabolic panel; Future; Expected date: 11/25/2019  -     Vitamin D; Future; Expected date: 11/25/2019  -     PTH, intact; Future; Expected date: 11/25/2019  Note to Dr. Villarreal - one also sent by Dr. Agee too  Anemia, unspecified type  -     Iron and TIBC; Future; Expected date: 11/25/2019  -     Ferritin; Future; Expected date: 11/25/2019    Hyperglycemia  -     Hemoglobin A1c; Future; Expected date: 11/25/2019    Essential hypertension  -     TSH; Future; Expected date: 11/25/2019    Hyperlipidemia, unspecified hyperlipidemia type  -     Lipid panel; Future; Expected date: 11/25/2019    Preop cardiovascular exam  -     APTT; Future; Expected date: 11/25/2019  -     Protime-INR; Future; Expected date: 11/25/2019  -     EKG 12-lead    Abnormal coagulation profile   -     APTT; Future; Expected date: 11/25/2019  -     Protime-INR; Future; Expected date:     Await studies and will place final clearance.

## 2019-12-03 NOTE — PROGRESS NOTES
"Individual Psychotherapy (PhD/LCSW)    12/2/2019    Site: [facility]    Therapeutic Intervention: Met with patient alone.  Outpatient - Insight oriented psychotherapy 45 min - CPT code 67026    Chief complaint/reason for encounter: depression, distractibility     Interval history and content of current session: Pt 20 minutes late. She continues to linger in bed x hours each day, not getting up and going until 2-4pm, then avoiding writing her notes for work, avoiding making calls to family members. Talk to pt about how she might motivate herself to get on track. Pt is receptive but admits she "digs in her heels" and resists her own good intentions.    Treatment plan:  · Target symptoms: depression, distractibility  · Why chosen therapy is appropriate versus another modality: relevant to diagnosis  · Outcome monitoring methods: self-report  · Therapeutic intervention type: insight oriented psychotherapy    Risk parameters:  Patient reports no suicidal ideation  Patient reports no homicidal ideation  Patient reports no self-injurious behavior  Patient reports no violent behavior    Verbal deficits: None    Patient's response to intervention:  The patient's response to intervention is motivated    Progress toward goals and other mental status changes:  The patient's progress toward goals is limited    Diagnosis: Major depression, recurrent, in partial remission,  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

## 2019-12-09 ENCOUNTER — PATIENT MESSAGE (OUTPATIENT)
Dept: SURGERY | Facility: OTHER | Age: 69
End: 2019-12-09

## 2019-12-13 ENCOUNTER — TELEPHONE (OUTPATIENT)
Dept: CARDIOLOGY | Facility: CLINIC | Age: 69
End: 2019-12-13

## 2019-12-13 ENCOUNTER — ANESTHESIA EVENT (OUTPATIENT)
Dept: SURGERY | Facility: OTHER | Age: 69
End: 2019-12-13
Payer: MEDICARE

## 2019-12-13 ENCOUNTER — HOSPITAL ENCOUNTER (OUTPATIENT)
Dept: PREADMISSION TESTING | Facility: OTHER | Age: 69
Discharge: HOME | End: 2019-12-13
Attending: OTOLARYNGOLOGY
Payer: MEDICARE

## 2019-12-13 VITALS
HEART RATE: 61 BPM | DIASTOLIC BLOOD PRESSURE: 74 MMHG | SYSTOLIC BLOOD PRESSURE: 117 MMHG | BODY MASS INDEX: 23.95 KG/M2 | OXYGEN SATURATION: 100 % | HEIGHT: 68 IN | TEMPERATURE: 99 F | WEIGHT: 158 LBS | RESPIRATION RATE: 16 BRPM

## 2019-12-13 DIAGNOSIS — Z01.818 PRE-OP TESTING: ICD-10-CM

## 2019-12-13 DIAGNOSIS — M95.0 ACQUIRED NOSE DEFORMITY: Primary | ICD-10-CM

## 2019-12-13 DIAGNOSIS — I10 ESSENTIAL HYPERTENSION: ICD-10-CM

## 2019-12-13 DIAGNOSIS — E78.2 MIXED HYPERLIPIDEMIA: ICD-10-CM

## 2019-12-13 DIAGNOSIS — N18.30 CKD (CHRONIC KIDNEY DISEASE) STAGE 3, GFR 30-59 ML/MIN: ICD-10-CM

## 2019-12-13 DIAGNOSIS — I10 ESSENTIAL HYPERTENSION: Primary | ICD-10-CM

## 2019-12-13 LAB
ANION GAP SERPL CALC-SCNC: 9 MMOL/L (ref 8–16)
BASOPHILS # BLD AUTO: 0.04 K/UL (ref 0–0.2)
BASOPHILS NFR BLD: 0.6 % (ref 0–1.9)
BUN SERPL-MCNC: 22 MG/DL (ref 8–23)
CALCIUM SERPL-MCNC: 10.3 MG/DL (ref 8.7–10.5)
CHLORIDE SERPL-SCNC: 107 MMOL/L (ref 95–110)
CO2 SERPL-SCNC: 25 MMOL/L (ref 23–29)
CREAT SERPL-MCNC: 1 MG/DL (ref 0.5–1.4)
DIFFERENTIAL METHOD: ABNORMAL
EOSINOPHIL # BLD AUTO: 0.3 K/UL (ref 0–0.5)
EOSINOPHIL NFR BLD: 4.9 % (ref 0–8)
ERYTHROCYTE [DISTWIDTH] IN BLOOD BY AUTOMATED COUNT: 12.8 % (ref 11.5–14.5)
EST. GFR  (AFRICAN AMERICAN): >60 ML/MIN/1.73 M^2
EST. GFR  (NON AFRICAN AMERICAN): 57 ML/MIN/1.73 M^2
GLUCOSE SERPL-MCNC: 100 MG/DL (ref 70–110)
HCT VFR BLD AUTO: 36.6 % (ref 37–48.5)
HGB BLD-MCNC: 11.8 G/DL (ref 12–16)
IMM GRANULOCYTES # BLD AUTO: 0.01 K/UL (ref 0–0.04)
IMM GRANULOCYTES NFR BLD AUTO: 0.2 % (ref 0–0.5)
LYMPHOCYTES # BLD AUTO: 1.6 K/UL (ref 1–4.8)
LYMPHOCYTES NFR BLD: 24.3 % (ref 18–48)
MCH RBC QN AUTO: 29.6 PG (ref 27–31)
MCHC RBC AUTO-ENTMCNC: 32.2 G/DL (ref 32–36)
MCV RBC AUTO: 92 FL (ref 82–98)
MONOCYTES # BLD AUTO: 0.8 K/UL (ref 0.3–1)
MONOCYTES NFR BLD: 11.8 % (ref 4–15)
NEUTROPHILS # BLD AUTO: 3.8 K/UL (ref 1.8–7.7)
NEUTROPHILS NFR BLD: 58.2 % (ref 38–73)
NRBC BLD-RTO: 0 /100 WBC
PLATELET # BLD AUTO: 201 K/UL (ref 150–350)
PLATELET FUNCTION ASSAY - EPINEPHRINE: 88 SECS (ref 76–199)
PMV BLD AUTO: 10.1 FL (ref 9.2–12.9)
POTASSIUM SERPL-SCNC: 4.3 MMOL/L (ref 3.5–5.1)
RBC # BLD AUTO: 3.99 M/UL (ref 4–5.4)
SODIUM SERPL-SCNC: 141 MMOL/L (ref 136–145)
WBC # BLD AUTO: 6.59 K/UL (ref 3.9–12.7)

## 2019-12-13 PROCEDURE — 80048 BASIC METABOLIC PNL TOTAL CA: CPT | Mod: HCNC

## 2019-12-13 PROCEDURE — 36415 COLL VENOUS BLD VENIPUNCTURE: CPT | Mod: HCNC

## 2019-12-13 PROCEDURE — 85576 BLOOD PLATELET AGGREGATION: CPT | Mod: HCNC

## 2019-12-13 PROCEDURE — 85025 COMPLETE CBC W/AUTO DIFF WBC: CPT | Mod: HCNC

## 2019-12-13 RX ORDER — PREGABALIN 75 MG/1
75 CAPSULE ORAL
Status: CANCELLED | OUTPATIENT
Start: 2019-12-13 | End: 2019-12-13

## 2019-12-13 RX ORDER — SODIUM CHLORIDE, SODIUM LACTATE, POTASSIUM CHLORIDE, CALCIUM CHLORIDE 600; 310; 30; 20 MG/100ML; MG/100ML; MG/100ML; MG/100ML
INJECTION, SOLUTION INTRAVENOUS CONTINUOUS
Status: CANCELLED | OUTPATIENT
Start: 2019-12-13

## 2019-12-13 RX ORDER — ACETAMINOPHEN 500 MG
1000 TABLET ORAL
Status: CANCELLED | OUTPATIENT
Start: 2019-12-13 | End: 2019-12-13

## 2019-12-13 RX ORDER — LIDOCAINE HYDROCHLORIDE 10 MG/ML
0.5 INJECTION, SOLUTION EPIDURAL; INFILTRATION; INTRACAUDAL; PERINEURAL ONCE
Status: CANCELLED | OUTPATIENT
Start: 2019-12-13 | End: 2019-12-13

## 2019-12-13 RX ORDER — FAMOTIDINE 20 MG/1
20 TABLET, FILM COATED ORAL
Status: CANCELLED | OUTPATIENT
Start: 2019-12-13 | End: 2019-12-13

## 2019-12-13 NOTE — ANESTHESIA PREPROCEDURE EVALUATION
12/13/2019  Manisha Wynne is a 69 y.o., female.    Anesthesia Evaluation    I have reviewed the Patient Summary Reports.    I have reviewed the Nursing Notes.   I have reviewed the Medications.     Review of Systems  Anesthesia Hx:  No problems with previous Anesthesia    Social:  Former Smoker, Social Alcohol Use    Hematology/Oncology:  Hematology Normal   Oncology Normal     EENT/Dental:EENT/Dental Normal   Cardiovascular:   Pacemaker Hypertension, well controlled Dysrhythmias atrial fibrillation hyperlipidemia SSS. 1st degree AV block.   Pulmonary:   Sleep Apnea    Renal/:   Chronic Renal Disease, CRI    Musculoskeletal:  Spine Disorders: lumbar Degenerative disease    Neurological:   Neuromuscular Disease,   Peripheral Neuropathy    Endocrine:   Hyperparathyroidism.   Psych:   Psychiatric History depression          Physical Exam  General:  Well nourished    Airway/Jaw/Neck:  Airway Findings: Mouth Opening: Normal Tongue: Normal  General Airway Assessment: Adult, Average  Mallampati: II  TM Distance: Normal, at least 6 cm  Jaw/Neck Findings:  Neck ROM: Normal ROM      Dental:  Dental Findings: In tact, Molar caps        Mental Status:  Mental Status Findings:  Cooperative, Alert and Oriented         Anesthesia Plan  Type of Anesthesia, risks & benefits discussed:  Anesthesia Type:  general  Patient's Preference:   Intra-op Monitoring Plan: standard ASA monitors  Intra-op Monitoring Plan Comments:   Post Op Pain Control Plan: per primary service following discharge from PACU and multimodal analgesia  Post Op Pain Control Plan Comments:   Induction:    Beta Blocker:         Informed Consent: Patient understands risks and agrees with Anesthesia plan.  Questions answered. Anesthesia consent signed with patient.  ASA Score: 3     Day of Surgery Review of History & Physical:    H&P update referred  to the surgeon.     Anesthesia Plan Notes: Labs and EKG are in Epic.        Ready For Surgery From Anesthesia Perspective.

## 2019-12-13 NOTE — DISCHARGE INSTRUCTIONS
PRE-ADMIT TESTING -  [phone]    [address]  [place] [facility]          Your surgery has been scheduled at [facility]. We are pleased to have the opportunity to serve you. For Further Information please call [phone].    On the day of surgery please report to the Information Desk on the 1st floor.    · CONTACT YOUR PHYSICIAN'S OFFICE THE DAY PRIOR TO YOUR SURGERY TO OBTAIN YOUR ARRIVAL TIME.     · The evening before surgery do not eat anything after 9 p.m. ( this includes hard candy, chewing gum and mints).  You may only have GATORADE, POWERADE AND WATER  from 9 p.m. until you leave your home.   DO NOT DRINK ANY LIQUIDS ON THE WAY TO THE HOSPITAL.      SPECIAL MEDICATION INSTRUCTIONS: TAKE medications checked off by the Anesthesiologist on your Medication List.    Angiogram Patients: Take medications as instructed by your physician, including aspirin.     Surgery Patients:    If you take ASPIRIN - Your PHYSICIAN/SURGEON will need to inform you IF/OR when you need to stop taking aspirin prior to your surgery.     Do Not take any medications containing IBUPROFEN.  Do Not Wear any make-up or dark nail polish   (especially eye make-up) to surgery. If you come to surgery with makeup on you will be required to remove the makeup or nail polish.    Do not shave your surgical area at least 5 days prior to your surgery. The surgical prep will be performed at the hospital according to Infection Control regulations.    Leave all valuables at home.   Do Not wear any jewelry or watches, including any metal in body piercings. Jewelry must be removed prior to coming to the hospital.  There is a possibility that rings that are unable to be removed may be cut off if they are on the surgical extremity.    Contact Lens must be removed before surgery. Either do not wear the contact lens or bring a case and solution for storage.  Please bring a container for eyeglasses or dentures as required.  Bring  any paperwork your physician has provided, such as consent forms,  history and physicals, doctor's orders, etc.   Bring comfortable clothes that are loose fitting to wear upon discharge. Take into consideration the type of surgery being performed.  Maintain your diet as advised per your physician the day prior to surgery.      Adequate rest the night before surgery is advised.   Park in the Parking lot behind the hospital or in the [facility] across the street from the parking lot. Parking is complimentary.  If you will be discharged the same day as your procedure, please arrange for a responsible adult to drive you home or to accompany you if traveling by taxi.   YOU WILL NOT BE PERMITTED TO DRIVE OR TO LEAVE THE HOSPITAL ALONE AFTER SURGERY.   It is strongly recommended that you arrange for someone to remain with you for the first 24 hrs following your surgery.    The Surgeon will speak to your family/visitor after your surgery regarding the outcome of your surgery and post op care.  The Surgeon may speak to you after your surgery, but there is a possibility you may not remember the details.  Please check with your family members regarding the conversation with the Surgeon.    We strongly recommend whoever is bringing you home be present for discharge instructions.  This will ensure a thorough understanding for your post op home care.    EACH PATIENT IS ALLOWED TWO FAMILY MEMBERS OR VISITORS IN THE ROOM AND IN THE WAITING ROOMS WHILE YOU ARE IN SURGERY. ALL CHILDREN MUST ALWAYS BE ACCOMPANIED BY AN ADULT.    Thank you for your cooperation.  The Staff of [facility].                Bathing Instructions with Hibiclens     Shower the evening before and morning of your procedure with Hibiclens:   Wash your face with water and your regular face wash/soap   Apply Hibiclens directly on your skin or on a wet washcloth and wash gently. When showering: Move away from the shower stream when  applying Hibiclens to avoid rinsing off too soon.   Rinse thoroughly with warm water   Do not dilute Hibiclens         Dry off as usual, do not use any deodorant, powder, body lotions, perfume, after shave or cologne.

## 2019-12-16 ENCOUNTER — OFFICE VISIT (OUTPATIENT)
Dept: PSYCHIATRY | Facility: CLINIC | Age: 69
End: 2019-12-16
Payer: MEDICARE

## 2019-12-16 DIAGNOSIS — F33.41 MAJOR DEPRESSIVE DISORDER, RECURRENT EPISODE, IN PARTIAL REMISSION: Primary | ICD-10-CM

## 2019-12-16 DIAGNOSIS — F98.8 ATTENTION DEFICIT DISORDER (ADD) IN ADULT: ICD-10-CM

## 2019-12-16 PROCEDURE — 99999 PR PBB SHADOW E&M-EST. PATIENT-LVL I: CPT | Mod: PBBFAC,HCNC,, | Performed by: SOCIAL WORKER

## 2019-12-16 PROCEDURE — 99999 PR PBB SHADOW E&M-EST. PATIENT-LVL I: ICD-10-PCS | Mod: PBBFAC,HCNC,, | Performed by: SOCIAL WORKER

## 2019-12-16 PROCEDURE — 90834 PR PSYCHOTHERAPY W/PATIENT, 45 MIN: ICD-10-PCS | Mod: HCNC,S$GLB,, | Performed by: SOCIAL WORKER

## 2019-12-16 PROCEDURE — 90834 PSYTX W PT 45 MINUTES: CPT | Mod: HCNC,S$GLB,, | Performed by: SOCIAL WORKER

## 2019-12-16 NOTE — PROGRESS NOTES
Individual Psychotherapy (PhD/LCSW)    12/16/2019    Site: [facility]    Therapeutic Intervention: Met with patient alone.  Outpatient - Insight oriented psychotherapy 45 min - CPT code 82400    Chief complaint/reason for encounter: depression, distractibility     Interval history and content of current session: Pt preparing for surgery, feels good about her planning and preparation. She has unpacked some boxes at her house and found things she was looking for. She is trying to catch up all her work before the surgery. Discuss challenges and strategies.    Treatment plan:  · Target symptoms: depression, distractibility  · Why chosen therapy is appropriate versus another modality: relevant to diagnosis  · Outcome monitoring methods: self-report  · Therapeutic intervention type: insight oriented psychotherapy    Risk parameters:  Patient reports no suicidal ideation  Patient reports no homicidal ideation  Patient reports no self-injurious behavior  Patient reports no violent behavior    Verbal deficits: None    Patient's response to intervention:  The patient's response to intervention is motivated    Progress toward goals and other mental status changes:  The patient's progress toward goals is fair    Diagnosis: Major depression, recurrent, in partial remission,  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

## 2019-12-18 ENCOUNTER — TELEPHONE (OUTPATIENT)
Dept: OTOLARYNGOLOGY | Facility: CLINIC | Age: 69
End: 2019-12-18

## 2019-12-19 ENCOUNTER — HOSPITAL ENCOUNTER (OUTPATIENT)
Facility: OTHER | Age: 69
Discharge: HOME | End: 2019-12-19
Attending: OTOLARYNGOLOGY | Admitting: OTOLARYNGOLOGY
Payer: MEDICARE

## 2019-12-19 ENCOUNTER — ANESTHESIA (OUTPATIENT)
Dept: SURGERY | Facility: OTHER | Age: 69
End: 2019-12-19
Payer: MEDICARE

## 2019-12-19 VITALS
RESPIRATION RATE: 16 BRPM | TEMPERATURE: 98 F | DIASTOLIC BLOOD PRESSURE: 66 MMHG | OXYGEN SATURATION: 96 % | HEIGHT: 68 IN | SYSTOLIC BLOOD PRESSURE: 110 MMHG | BODY MASS INDEX: 23.95 KG/M2 | HEART RATE: 60 BPM | WEIGHT: 158 LBS

## 2019-12-19 DIAGNOSIS — M95.0 NASAL DEFORMITY: Primary | ICD-10-CM

## 2019-12-19 PROCEDURE — 37000009 HC ANESTHESIA EA ADD 15 MINS: Mod: HCNC | Performed by: OTOLARYNGOLOGY

## 2019-12-19 PROCEDURE — 27201423 OPTIME MED/SURG SUP & DEVICES STERILE SUPPLY: Mod: HCNC | Performed by: OTOLARYNGOLOGY

## 2019-12-19 PROCEDURE — 71000033 HC RECOVERY, INTIAL HOUR: Mod: HCNC | Performed by: OTOLARYNGOLOGY

## 2019-12-19 PROCEDURE — 30465 PR REPAIR NASAL CAVITY STENOSIS: ICD-10-PCS | Mod: HCNC,,, | Performed by: OTOLARYNGOLOGY

## 2019-12-19 PROCEDURE — 36000707: Mod: HCNC | Performed by: OTOLARYNGOLOGY

## 2019-12-19 PROCEDURE — 36000706: Mod: HCNC | Performed by: OTOLARYNGOLOGY

## 2019-12-19 PROCEDURE — 63600175 PHARM REV CODE 636 W HCPCS: Mod: HCNC | Performed by: NURSE ANESTHETIST, CERTIFIED REGISTERED

## 2019-12-19 PROCEDURE — 30520 REPAIR OF NASAL SEPTUM: CPT | Mod: 51,HCNC,, | Performed by: OTOLARYNGOLOGY

## 2019-12-19 PROCEDURE — 21235 EAR CARTILAGE GRAFT: CPT | Mod: 51,HCNC,, | Performed by: OTOLARYNGOLOGY

## 2019-12-19 PROCEDURE — 25000003 PHARM REV CODE 250: Mod: HCNC | Performed by: SPECIALIST

## 2019-12-19 PROCEDURE — 25000003 PHARM REV CODE 250: Mod: HCNC | Performed by: NURSE ANESTHETIST, CERTIFIED REGISTERED

## 2019-12-19 PROCEDURE — 71000039 HC RECOVERY, EACH ADD'L HOUR: Mod: HCNC | Performed by: OTOLARYNGOLOGY

## 2019-12-19 PROCEDURE — 30140 RESECT INFERIOR TURBINATE: CPT | Mod: 50,51,HCNC, | Performed by: OTOLARYNGOLOGY

## 2019-12-19 PROCEDURE — 63600175 PHARM REV CODE 636 W HCPCS: Mod: HCNC | Performed by: SPECIALIST

## 2019-12-19 PROCEDURE — 37000008 HC ANESTHESIA 1ST 15 MINUTES: Mod: HCNC | Performed by: OTOLARYNGOLOGY

## 2019-12-19 PROCEDURE — 30465 REPAIR NASAL STENOSIS: CPT | Mod: HCNC,,, | Performed by: OTOLARYNGOLOGY

## 2019-12-19 PROCEDURE — 25000003 PHARM REV CODE 250: Mod: HCNC | Performed by: ANESTHESIOLOGY

## 2019-12-19 PROCEDURE — 71000016 HC POSTOP RECOV ADDL HR: Mod: HCNC | Performed by: OTOLARYNGOLOGY

## 2019-12-19 PROCEDURE — 30520 PR REPAIR, NASAL SEPTUM: ICD-10-PCS | Mod: 51,HCNC,, | Performed by: OTOLARYNGOLOGY

## 2019-12-19 PROCEDURE — 63600175 PHARM REV CODE 636 W HCPCS: Mod: HCNC | Performed by: STUDENT IN AN ORGANIZED HEALTH CARE EDUCATION/TRAINING PROGRAM

## 2019-12-19 PROCEDURE — 30140 PR EXCISION TURBINATE,SUBMUCOUS: ICD-10-PCS | Mod: 50,51,HCNC, | Performed by: OTOLARYNGOLOGY

## 2019-12-19 PROCEDURE — 25000003 PHARM REV CODE 250: Mod: HCNC | Performed by: OTOLARYNGOLOGY

## 2019-12-19 PROCEDURE — 63600175 PHARM REV CODE 636 W HCPCS: Mod: HCNC | Performed by: ANESTHESIOLOGY

## 2019-12-19 PROCEDURE — 71000015 HC POSTOP RECOV 1ST HR: Mod: HCNC | Performed by: OTOLARYNGOLOGY

## 2019-12-19 PROCEDURE — 21235 PR GRAFT-EAR CARTILAGE TO NOSE OR EAR: ICD-10-PCS | Mod: 51,HCNC,, | Performed by: OTOLARYNGOLOGY

## 2019-12-19 RX ORDER — EPHEDRINE SULFATE 50 MG/ML
INJECTION, SOLUTION INTRAVENOUS
Status: DISCONTINUED | OUTPATIENT
Start: 2019-12-19 | End: 2019-12-19

## 2019-12-19 RX ORDER — SODIUM CHLORIDE 0.9 % (FLUSH) 0.9 %
2 SYRINGE (ML) INJECTION
Status: DISCONTINUED | OUTPATIENT
Start: 2019-12-19 | End: 2019-12-19 | Stop reason: HOSPADM

## 2019-12-19 RX ORDER — HYDROCODONE BITARTRATE AND ACETAMINOPHEN 5; 325 MG/1; MG/1
1 TABLET ORAL EVERY 6 HOURS PRN
Qty: 15 TABLET | Refills: 0 | Status: SHIPPED | OUTPATIENT
Start: 2019-12-19 | End: 2019-12-19 | Stop reason: SDUPTHER

## 2019-12-19 RX ORDER — ACETAMINOPHEN 325 MG/1
650 TABLET ORAL EVERY 4 HOURS PRN
Status: DISCONTINUED | OUTPATIENT
Start: 2019-12-19 | End: 2019-12-19 | Stop reason: HOSPADM

## 2019-12-19 RX ORDER — ONDANSETRON HYDROCHLORIDE 2 MG/ML
INJECTION, SOLUTION INTRAMUSCULAR; INTRAVENOUS
Status: DISCONTINUED | OUTPATIENT
Start: 2019-12-19 | End: 2019-12-19

## 2019-12-19 RX ORDER — LIDOCAINE HYDROCHLORIDE AND EPINEPHRINE 10; 10 MG/ML; UG/ML
INJECTION, SOLUTION INFILTRATION; PERINEURAL
Status: DISCONTINUED | OUTPATIENT
Start: 2019-12-19 | End: 2019-12-19 | Stop reason: HOSPADM

## 2019-12-19 RX ORDER — IBUPROFEN 600 MG/1
600 TABLET ORAL EVERY 6 HOURS PRN
Qty: 30 TABLET | Refills: 0 | COMMUNITY
Start: 2019-12-19 | End: 2020-03-31

## 2019-12-19 RX ORDER — LIDOCAINE HYDROCHLORIDE 10 MG/ML
0.5 INJECTION, SOLUTION EPIDURAL; INFILTRATION; INTRACAUDAL; PERINEURAL ONCE
Status: DISCONTINUED | OUTPATIENT
Start: 2019-12-19 | End: 2019-12-19 | Stop reason: HOSPADM

## 2019-12-19 RX ORDER — FAMOTIDINE 20 MG/1
20 TABLET, FILM COATED ORAL
Status: COMPLETED | OUTPATIENT
Start: 2019-12-19 | End: 2019-12-19

## 2019-12-19 RX ORDER — BUPIVACAINE HYDROCHLORIDE AND EPINEPHRINE 5; 5 MG/ML; UG/ML
INJECTION, SOLUTION EPIDURAL; INTRACAUDAL; PERINEURAL
Status: DISCONTINUED | OUTPATIENT
Start: 2019-12-19 | End: 2019-12-19 | Stop reason: HOSPADM

## 2019-12-19 RX ORDER — METOCLOPRAMIDE HYDROCHLORIDE 5 MG/ML
5 INJECTION INTRAMUSCULAR; INTRAVENOUS EVERY 6 HOURS PRN
Status: DISCONTINUED | OUTPATIENT
Start: 2019-12-19 | End: 2019-12-19 | Stop reason: HOSPADM

## 2019-12-19 RX ORDER — CEPHALEXIN 500 MG/1
500 CAPSULE ORAL EVERY 8 HOURS
Qty: 21 CAPSULE | Refills: 0 | Status: SHIPPED | OUTPATIENT
Start: 2019-12-19 | End: 2019-12-26

## 2019-12-19 RX ORDER — NEOSTIGMINE METHYLSULFATE 1 MG/ML
INJECTION, SOLUTION INTRAVENOUS
Status: DISCONTINUED | OUTPATIENT
Start: 2019-12-19 | End: 2019-12-19

## 2019-12-19 RX ORDER — HYDROMORPHONE HYDROCHLORIDE 2 MG/ML
0.4 INJECTION, SOLUTION INTRAMUSCULAR; INTRAVENOUS; SUBCUTANEOUS EVERY 5 MIN PRN
Status: DISCONTINUED | OUTPATIENT
Start: 2019-12-19 | End: 2019-12-19 | Stop reason: HOSPADM

## 2019-12-19 RX ORDER — ONDANSETRON 2 MG/ML
4 INJECTION INTRAMUSCULAR; INTRAVENOUS DAILY PRN
Status: DISCONTINUED | OUTPATIENT
Start: 2019-12-19 | End: 2019-12-19 | Stop reason: HOSPADM

## 2019-12-19 RX ORDER — PREGABALIN 75 MG/1
75 CAPSULE ORAL
Status: COMPLETED | OUTPATIENT
Start: 2019-12-19 | End: 2019-12-19

## 2019-12-19 RX ORDER — FENTANYL CITRATE 50 UG/ML
INJECTION, SOLUTION INTRAMUSCULAR; INTRAVENOUS
Status: DISCONTINUED | OUTPATIENT
Start: 2019-12-19 | End: 2019-12-19

## 2019-12-19 RX ORDER — ACETAMINOPHEN 500 MG
1000 TABLET ORAL
Status: COMPLETED | OUTPATIENT
Start: 2019-12-19 | End: 2019-12-19

## 2019-12-19 RX ORDER — LIDOCAINE HYDROCHLORIDE 10 MG/ML
1 INJECTION, SOLUTION EPIDURAL; INFILTRATION; INTRACAUDAL; PERINEURAL ONCE
Status: DISCONTINUED | OUTPATIENT
Start: 2019-12-19 | End: 2019-12-19 | Stop reason: HOSPADM

## 2019-12-19 RX ORDER — DEXAMETHASONE SODIUM PHOSPHATE 4 MG/ML
INJECTION, SOLUTION INTRA-ARTICULAR; INTRALESIONAL; INTRAMUSCULAR; INTRAVENOUS; SOFT TISSUE
Status: DISCONTINUED | OUTPATIENT
Start: 2019-12-19 | End: 2019-12-19

## 2019-12-19 RX ORDER — HYDROCODONE BITARTRATE AND ACETAMINOPHEN 5; 325 MG/1; MG/1
1 TABLET ORAL EVERY 4 HOURS PRN
Status: DISCONTINUED | OUTPATIENT
Start: 2019-12-19 | End: 2019-12-19 | Stop reason: HOSPADM

## 2019-12-19 RX ORDER — MEPERIDINE HYDROCHLORIDE 25 MG/ML
12.5 INJECTION INTRAMUSCULAR; INTRAVENOUS; SUBCUTANEOUS ONCE AS NEEDED
Status: DISCONTINUED | OUTPATIENT
Start: 2019-12-19 | End: 2019-12-19 | Stop reason: HOSPADM

## 2019-12-19 RX ORDER — PROPOFOL 10 MG/ML
VIAL (ML) INTRAVENOUS CONTINUOUS PRN
Status: DISCONTINUED | OUTPATIENT
Start: 2019-12-19 | End: 2019-12-19

## 2019-12-19 RX ORDER — OXYCODONE HYDROCHLORIDE 5 MG/1
5 TABLET ORAL
Status: DISCONTINUED | OUTPATIENT
Start: 2019-12-19 | End: 2019-12-19 | Stop reason: HOSPADM

## 2019-12-19 RX ORDER — HYDROCODONE BITARTRATE AND ACETAMINOPHEN 5; 325 MG/1; MG/1
1 TABLET ORAL EVERY 6 HOURS PRN
Qty: 15 TABLET | Refills: 0 | Status: ON HOLD | OUTPATIENT
Start: 2019-12-19 | End: 2020-01-24 | Stop reason: HOSPADM

## 2019-12-19 RX ORDER — ROCURONIUM BROMIDE 10 MG/ML
INJECTION, SOLUTION INTRAVENOUS
Status: DISCONTINUED | OUTPATIENT
Start: 2019-12-19 | End: 2019-12-19

## 2019-12-19 RX ORDER — LIDOCAINE HCL/PF 100 MG/5ML
SYRINGE (ML) INTRAVENOUS
Status: DISCONTINUED | OUTPATIENT
Start: 2019-12-19 | End: 2019-12-19

## 2019-12-19 RX ORDER — GLYCOPYRROLATE 0.2 MG/ML
INJECTION INTRAMUSCULAR; INTRAVENOUS
Status: DISCONTINUED | OUTPATIENT
Start: 2019-12-19 | End: 2019-12-19

## 2019-12-19 RX ORDER — OXYMETAZOLINE HCL 0.05 %
SPRAY, NON-AEROSOL (ML) NASAL
Status: DISCONTINUED | OUTPATIENT
Start: 2019-12-19 | End: 2019-12-19 | Stop reason: HOSPADM

## 2019-12-19 RX ORDER — ONDANSETRON 8 MG/1
8 TABLET, ORALLY DISINTEGRATING ORAL EVERY 6 HOURS PRN
Status: DISCONTINUED | OUTPATIENT
Start: 2019-12-19 | End: 2019-12-19 | Stop reason: HOSPADM

## 2019-12-19 RX ORDER — OXYCODONE HYDROCHLORIDE 5 MG/1
10 TABLET ORAL EVERY 4 HOURS PRN
Status: DISCONTINUED | OUTPATIENT
Start: 2019-12-19 | End: 2019-12-19 | Stop reason: HOSPADM

## 2019-12-19 RX ORDER — SODIUM CHLORIDE 0.9 % (FLUSH) 0.9 %
3 SYRINGE (ML) INJECTION
Status: DISCONTINUED | OUTPATIENT
Start: 2019-12-19 | End: 2019-12-19 | Stop reason: HOSPADM

## 2019-12-19 RX ORDER — SODIUM CHLORIDE, SODIUM LACTATE, POTASSIUM CHLORIDE, CALCIUM CHLORIDE 600; 310; 30; 20 MG/100ML; MG/100ML; MG/100ML; MG/100ML
INJECTION, SOLUTION INTRAVENOUS CONTINUOUS
Status: DISCONTINUED | OUTPATIENT
Start: 2019-12-19 | End: 2019-12-19 | Stop reason: HOSPADM

## 2019-12-19 RX ORDER — ONDANSETRON 8 MG/1
8 TABLET, ORALLY DISINTEGRATING ORAL EVERY 6 HOURS PRN
Qty: 15 TABLET | Refills: 0 | Status: SHIPPED | OUTPATIENT
Start: 2019-12-19 | End: 2020-03-31

## 2019-12-19 RX ORDER — CEFAZOLIN SODIUM 1 G/3ML
2 INJECTION, POWDER, FOR SOLUTION INTRAMUSCULAR; INTRAVENOUS
Status: COMPLETED | OUTPATIENT
Start: 2019-12-19 | End: 2019-12-19

## 2019-12-19 RX ORDER — PROPOFOL 10 MG/ML
VIAL (ML) INTRAVENOUS
Status: DISCONTINUED | OUTPATIENT
Start: 2019-12-19 | End: 2019-12-19

## 2019-12-19 RX ORDER — BACITRACIN ZINC 500 UNIT/G
OINTMENT (GRAM) TOPICAL
Status: DISCONTINUED | OUTPATIENT
Start: 2019-12-19 | End: 2019-12-19 | Stop reason: HOSPADM

## 2019-12-19 RX ORDER — PHENYLEPHRINE HYDROCHLORIDE 10 MG/ML
INJECTION INTRAVENOUS
Status: DISCONTINUED | OUTPATIENT
Start: 2019-12-19 | End: 2019-12-19

## 2019-12-19 RX ADMIN — GLYCOPYRROLATE 0.2 MG: 0.2 INJECTION, SOLUTION INTRAMUSCULAR; INTRAVENOUS at 09:12

## 2019-12-19 RX ADMIN — EPHEDRINE SULFATE 10 MG: 50 INJECTION INTRAMUSCULAR; INTRAVENOUS; SUBCUTANEOUS at 09:12

## 2019-12-19 RX ADMIN — PROPOFOL 50 MCG/KG/MIN: 10 INJECTION, EMULSION INTRAVENOUS at 09:12

## 2019-12-19 RX ADMIN — PROPOFOL 170 MG: 10 INJECTION, EMULSION INTRAVENOUS at 09:12

## 2019-12-19 RX ADMIN — GLYCOPYRROLATE 0.6 MG: 0.2 INJECTION, SOLUTION INTRAMUSCULAR; INTRAVENOUS at 10:12

## 2019-12-19 RX ADMIN — HYDROMORPHONE HYDROCHLORIDE 0.4 MG: 2 INJECTION, SOLUTION INTRAMUSCULAR; INTRAVENOUS; SUBCUTANEOUS at 11:12

## 2019-12-19 RX ADMIN — PREGABALIN 75 MG: 75 CAPSULE ORAL at 08:12

## 2019-12-19 RX ADMIN — ROCURONIUM BROMIDE 30 MG: 10 INJECTION, SOLUTION INTRAVENOUS at 09:12

## 2019-12-19 RX ADMIN — CEFAZOLIN 2 G: 330 INJECTION, POWDER, FOR SOLUTION INTRAMUSCULAR; INTRAVENOUS at 09:12

## 2019-12-19 RX ADMIN — FENTANYL CITRATE 100 MCG: 50 INJECTION, SOLUTION INTRAMUSCULAR; INTRAVENOUS at 09:12

## 2019-12-19 RX ADMIN — ACETAMINOPHEN 1000 MG: 500 TABLET, FILM COATED ORAL at 08:12

## 2019-12-19 RX ADMIN — OXYCODONE HYDROCHLORIDE 5 MG: 5 TABLET ORAL at 11:12

## 2019-12-19 RX ADMIN — CARBOXYMETHYLCELLULOSE SODIUM 2 DROP: 2.5 SOLUTION/ DROPS OPHTHALMIC at 09:12

## 2019-12-19 RX ADMIN — SODIUM CHLORIDE, SODIUM LACTATE, POTASSIUM CHLORIDE, AND CALCIUM CHLORIDE: 600; 310; 30; 20 INJECTION, SOLUTION INTRAVENOUS at 10:12

## 2019-12-19 RX ADMIN — SODIUM CHLORIDE, SODIUM LACTATE, POTASSIUM CHLORIDE, AND CALCIUM CHLORIDE: 600; 310; 30; 20 INJECTION, SOLUTION INTRAVENOUS at 08:12

## 2019-12-19 RX ADMIN — PHENYLEPHRINE HYDROCHLORIDE 100 MCG: 10 INJECTION INTRAVENOUS at 09:12

## 2019-12-19 RX ADMIN — PROPOFOL 40 MG: 10 INJECTION, EMULSION INTRAVENOUS at 09:12

## 2019-12-19 RX ADMIN — ONDANSETRON 4 MG: 2 INJECTION, SOLUTION INTRAMUSCULAR; INTRAVENOUS at 09:12

## 2019-12-19 RX ADMIN — NEOSTIGMINE METHYLSULFATE 3 MG: 1 INJECTION INTRAVENOUS at 10:12

## 2019-12-19 RX ADMIN — DEXAMETHASONE SODIUM PHOSPHATE 8 MG: 4 INJECTION, SOLUTION INTRAMUSCULAR; INTRAVENOUS at 09:12

## 2019-12-19 RX ADMIN — LIDOCAINE HYDROCHLORIDE 50 MG: 20 INJECTION, SOLUTION INTRAVENOUS at 09:12

## 2019-12-19 RX ADMIN — FAMOTIDINE 20 MG: 20 TABLET, FILM COATED ORAL at 08:12

## 2019-12-19 NOTE — PLAN OF CARE
Manisha Echevarria Sherrell has met all discharge criteria from Phase II. Vital Signs are stable, ambulating  without difficulty.Pain is now under control and tolerable for the pt. Pain score 3/10 at this time.  Discharge instructions given, patient verbalized understanding. Discharged from facility via wheelchair in stable condition.

## 2019-12-19 NOTE — TRANSFER OF CARE
"Anesthesia Transfer of Care Note    Patient: Manisha Wynne    Procedure(s) Performed: Procedure(s) (LRB):  SEPTOPLASTY, NOSE (N/A)  REDUCTION, NASAL TURBINATE (Bilateral)  RECONSTRUCTION, NOSE (N/A)  APPLICATION, CARTILAGE GRAFT AURICULAR BATTEN (Bilateral)    Patient location: PACU    Anesthesia Type: general    Transport from OR: Transported from OR on 6-10 L/min O2 by face mask with adequate spontaneous ventilation    Post pain: adequate analgesia    Post assessment: no apparent anesthetic complications    Post vital signs: stable    Level of consciousness: awake    Nausea/Vomiting: no nausea/vomiting    Complications: none    Transfer of care protocol was followed      Last vitals:   Visit Vitals  /67 (BP Location: Right arm, Patient Position: Lying)   Pulse (!) 59   Temp 36.2 °C (97.2 °F) (Oral)   Resp 18   Ht 5' 8" (1.727 m)   Wt 71.7 kg (158 lb)   LMP 10/01/2003   SpO2 100%   Breastfeeding? No   BMI 24.02 kg/m²     "

## 2019-12-19 NOTE — ANESTHESIA PROCEDURE NOTES
Intubation  Performed by: Nuria Jeffery CRNA  Authorized by: Alexsander Sandoval MD     Intubation:     Induction:  Intravenous    Intubated:  Postinduction    Mask Ventilation:  Easy mask    Attempts:  1    Attempted By:  CRNA    Method of Intubation:  Video laryngoscopy    Blade:  Babb 3    Laryngeal View Grade: Grade I - full view of chords      Difficult Airway Encountered?: No      Complications:  None    Airway Device:  Oral colten    Airway Device Size:  7.5    Style/Cuff Inflation:  Cuffed    Inflation Amount (mL):  4    Tube secured:  22    Placement Verified By:  Capnometry    Complicating Factors:  None    Findings Post-Intubation:  BS equal bilateral and atraumatic/condition of teeth unchanged

## 2019-12-19 NOTE — OP NOTE
OTOLARYNGOLOGY - HEAD & NECK SURGERY  OPERATIVE REPORT?     PATIENT: Manisha Wynne  : 1950  MRN: [id number]     PROCEDURE DATE: 2019    ATTENDING SURGEON(S): Dr. Mark MD     RESIDENT SURGEON(S): North Hartmann DO (Resident)    ANESTHESIA STAFF: No responsible provider has been recorded for the case.    OR STAFF: Circulator: June Mcneill RN; Anita Turner RN  Scrub Person: ST Kole      PRE-OPERATIVE DIAGNOSIS:  1. Nasal Valve Collapse    2. Nasal Septal Deviation    3. Inferior Turbinate Hypertrophy     POST-OPERATIVE DIAGNOSIS:  1. Nasal Valve Collapse    2. Nasal Septal Deviation    3. Inferior Turbinate Hypertrophy      PROCEDURE:   1. Nasal Reconstruction with bilateral auricular cartilage batten graft CPT 39424     2. Bilateral Auricular Britta Graft CPT 38445-50   3. Septoplasty, CPT 16237.   4. Submucous resection of the turbinates, CPT 30752-58.      INDICATION:     Manisha Wynne is a 69 y.o. female who presented to outpatient clinic for evaluation of nasal obstruction despite medical therapy.  On anterior rhinoscopy, patient was noted to have a  deviated nasal septum to left with external nasal valve collapse. The risks, benefits, and alternatives to nasal reconstruction with auricular batten grafts, septoplasty and inferior turbinate reduction were explained to the patient in detail. Pt expressed understanding, and elected to proceed with the aforementioned procedure.        DETAILS OF PROCEDURE:    After it was confirmed that consents were obtained and history and physical was up-to-date, the patient was brought back by Anesthesia where she was successfully intubated without any complications. The head of the bed was turned 90 degrees. The area was prepped, draped and injected in standard fashion to prepare for septoplasty, turbinate reduction, and nasal reconstruction. Approximately, 10 mL of lidocaine with epinephrine infiltrated in the bilateral  auricular ana, nasal septum , as well as the inferior turbinates bilaterally.     First, the right ear was incised sharply along the inner portion of the anti helix and a skin flap was raised to display the underlying cartilage of the ana. The cartilage was then sharply incised in the shape of a jelly bean and the cartilage was removed and passed to the back table to be saved for batten grafts. The incision was then closed with 5-0 chromic suture, first in a simple interrupted fashion to tack the skin flap to the ear, and then in a running, locking fashion to close the incision. Telfa was then sutured anteriorly and posteriorly to serve as a bolster to prevent hematoma formation. Next, the left ear was sharply excised and cartilage harvested in the exact same maneuver. The cartilage was saved and the incision closed as above.     Next, a curvilinear incision was made along the left septum and a mucoperichondrial-mucoperiosteal flap was elevated back to the level and beyond the bony cartilaginous junction. The bony-cartilaginous junction was stippled to then raise the mucoperiosteal flap on the contralateral side. As soon as adequate elevation had been achieved on both sides, heavy scissors were utilized to make a superior and an inferior cut along the bony part of the septum, and these were removed with Apurva forceps. Reapproximating the 2 mucoperichondrial-mucoperiosteal flaps, septum was noted to be straight with a patent nasal airway. At this point, the septoplasty portion of the procedure was complete. The hemitransfixion incision was closed anteriorly with 4-0 Chromic sutures in simple interrupted fashion. Next, a stapler was used to coapt the mucoperichondrial flaps.     Next, using the microdebrider, a submucous resection of the turbinates was carried out bilaterally in usual fashion. Once adequate reduction of each turbinate was obtained, a Alanis elevator was utilized to outfracture the inferior  turbinates bilaterally.This completed the submucous resection of turbinates portion of the procedure.     Next marginal intercartilaginous incisions were made with a 15c blade bilaterally. Blunt dissection was enable to create an intercartilaginous pocket. The left sided auricular graft was trimmed and affixed on the right. Right sided auricular graft was trimmed and affixed on the left. Adequate external nasal valve support was achieved. The marginal incisions were re approximated using 4-0 chromic suture in a simple interrupted fashion.        Septal splints were inserted and sutured to the septum. Next, nasal packing was inserted bilaterally and sutured with a 4-0 nylon stitch. An external nasal cast was placed.     At this point, the procedure was deemed complete. The patient's stomach contents were suctioned with an orogastric tube. The patient was then turned back towards Anesthesia, where he was extubated without incident and brought back to PACU, where he is in a stable condition at the time of this dictation.     Dr. Flores was present and performed all portions of this procedure.      ESTIMATED BLOOD LOSS:??Less than 10 mL.    LENGTH OF SURGERY: 1 Hr 46 Min 16 Sec    Event Time In Time Out   In Facility 0755    In Pre-Procedure 0757    Pre-Op: Bedside Procedure Start 0805    Pre-Op: Bedside Procedure Stop 0835    Pre-Procedure Complete 0835    Out of Pre-Procedure 0835    Holding Start 0843    Holding Complete 0904    Holding Stop 0904    Anesthesia Start 0901    In Room 0905    Procedure Start 0930    Procedure Closing 1029    Emergence 1043    Procedure Finish 1040    Out of Room 1051    In Recovery 1052    Anesthesia Finish 1054      1 Hr 46 Min 16 Sec     SPECIMENS: No specimens      COMPLICATIONS: ?None.     POSTOPERATIVE CONDITION: ?Stable.     DISPOSITION: PACU      North Hartmann DO  Otorhinolaryngology-Head & Neck Surgery  [facility]  12/19/2019

## 2019-12-19 NOTE — PLAN OF CARE
F - Chiara and Belief: Taoism. Pt relying on her chiara resources for strength.     I - Importance: Yes    C - Community: Pt connected to her chiara community and has friendship support within the city.     A - Address in Care:  Pt is a friend with this  and requested pre/post operative support. Introduced and offered pastoral support with compassionate presence, reflective listening, and supported pt in her prayers for healing. Will continue to follow.

## 2019-12-19 NOTE — BRIEF OP NOTE
[facility]  Brief Operative Note     SUMMARY     Surgery Date:   12/19/2019     SURGEON(S):   Surgeon(s) and Role:     * Worden HUSEYIN Flores III, MD - Primary    ANESTHESIA STAFF:   No responsible provider has been recorded for the case.    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]  Nasal septal deviation [J34.2]  Nasal obstruction [J34.89]  Nasal turbinate hypertrophy [J34.3]    Post-op Diagnosis:  Post-Op Diagnosis Codes:     * Nasal deformity, acquired [M95.0]     * Nasal septal deviation [J34.2]     * Nasal obstruction [J34.89]     * Nasal turbinate hypertrophy [J34.3]    Procedure(s) (LRB):  SEPTOPLASTY, NOSE (N/A)  REDUCTION, NASAL TURBINATE (Bilateral)  RECONSTRUCTION, NOSE (N/A)  APPLICATION, CARTILAGE GRAFT AURICULAR BATTEN (Bilateral)    Anesthesia: General    Description of the procedure: nasal reconstruction    Findings: see full op note    Estimated Blood Loss: minimal         Specimens:   Specimen (12h ago, onward)    None          LENGTH OF SURGERY:   1 Hr 46 Min 16 Sec    Event Time In Time Out   In Facility [id number]    In Pre-Procedure 0757    Physician Available     Anesthesia Available     Pre-Op: Bedside Procedure Start 0805    Pre-Op: Bedside Procedure Stop 0835    Pre-Procedure Complete 0835    Out of Pre-Procedure 0835    Holding Start 0843    Holding Complete 0904    Holding Stop 0904    Anesthesia Start 0901    Anesthesia Start Data Collection     Setup Start     Setup Complete     In Room 0905    Prep Start     Procedure Prep Complete     Procedure Start 0930    Procedure Closing 1029    Emergence 1043    Procedure Finish 1040    Out of Room 1051    Cleanup Start     Cleanup Complete     Cosmetic Start     Cosmetic Stop     Pain Mgmt In Room     Pain Mgmt Out Room     Anesthesia Stop Data Collection     In Recovery 1052    Anesthesia Finish 1054     Bedside Procedure Start     Bedside Procedure Stop     Recovery Care Complete     Out of Recovery     To Phase II     In Phase II     Pain Mgmt Recovery Start     Pain Mgmt Recovery Stop     Obs Rec Start     Obs Rec Stop     Phase II Care Complete     Out of Phase II     Procedural Care Complete     Pain Follow Up Needed     Pain Follow Up Complete     Sedation Start     Sedation End       Discharge Note    SUMMARY     Admit Date: 12/19/2019    Discharge Date and Time:   10:54 AM    Hospital Course: Please see the preoperative H&P and other available documentation for full details related to history prior to this admission.  Briefly, pt presented to [facility] for elective outpatient procedure. Procedure, risks and benefits were discussed with patient. All questions were answered. Informed consent was obtained. Pt was taken to the OR and underwent the above procedures without complications. Pt  was transferred to PACU in stable condition and discharged to home the same day.     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 1 Tablet, Delayed Release (E.C.) Oral Every day             buPROPion (WELLBUTRIN XL) 300 MG 24 hr tablet  Take 300 mg by mouth once daily.              calcium-vitamin D 600 mg(1,500mg) -200 unit Tab  Take 1 tablet by mouth once daily.              cephALEXin (KEFLEX) 500 MG capsule  Take 1 capsule (500 mg total) by mouth every 8 (eight) hours. for 7 days             cholecalciferol, vitamin D3, (VITAMIN D3) 5,000 unit Tab  Take 5,000 Units by mouth once daily.             ferrous sulfate  (FEOSOL) 325 mg (65 mg iron) Tab tablet  Take 325 mg by mouth daily with breakfast. Taking two times daily             flu vacc [id number],65yr up,PF (FLUZONE HIGH-DOSE 2019-20, PF,) 180 mcg/0.5 mL Syrg  admin as directed             FLUoxetine (PROZAC) 20 MG capsule  Take 40 mg by mouth once daily.              HYDROcodone-acetaminophen (NORCO) 5-325 mg per tablet  Take 1 tablet by mouth every 6 (six) hours as needed for Pain.             ibuprofen (ADVIL,MOTRIN) 600 MG tablet  Take 1 tablet (600 mg total) by mouth every 6 (six) hours as needed for Pain.             lisdexamfetamine (VYVANSE) 70 MG capsule  Take 70 mg by mouth every morning.             losartan (COZAAR) 25 MG tablet  Take 1 tablet (25 mg total) by mouth once daily.             metoprolol tartrate (LOPRESSOR) 25 MG tablet  TAKE 1 TABLET (25 MG TOTAL) BY MOUTH 2 (TWO) TIMES DAILY.             multivitamin-minerals-lutein (CENTRUM SILVER) Tab  Take by mouth. 1 Tablet Oral Every day             ondansetron (ZOFRAN-ODT) 8 MG TbDL  Take 1 tablet (8 mg total) by mouth every 6 (six) hours as needed (Nausea).             pravastatin (PRAVACHOL) 10 MG tablet  TAKE 1 TABLET (10 MG TOTAL) BY MOUTH ONCE DAILY.                 Discharge Procedure Orders   Diet general     Call MD for:  difficulty breathing, headache or visual disturbances     Call MD for:  redness, tenderness, or signs of infection (pain, swelling, redness, odor or green/yellow discharge around incision site)     Call MD for:  hives     Call MD for:   Order Comments: Persistent bleeding     No dressing needed     Activity as tolerated       Follow Up:   Follow-up Information     H Noah Flores MD In 1 week.    Specialty:  Otolaryngology  Contact information:  [address] [facility] [address] [phone]

## 2019-12-19 NOTE — H&P
Otolaryngology - Head and Neck Surgery       Subjective:      CC: here for surgery    HPI       Ms. Wynne is a 69-year-old white female  who was last seen by Dr. Flores 9 months ago.  At that time she had a sustained a fall striking her nose creating nasal deformity and nasal obstruction. She presents today for surgery to correct this deformity. She denies interval changes to her history.         ROS: ENT-focused ROS completed and is negative unless otherwise stated in the HPI.     Past Medical History:   Diagnosis Date    Abnormal Pap smear     Atrial fibrillation     AV block, 1st degree 2012    Cataract     Depression 2012    Facet arthritis of lumbar region 3/31/2015    Falls     3 falls in the last 6 mos--noted 19    Hyperlipidemia     Hypertension 2012    Neuropathy     Other specified cardiac dysrhythmias(427.89)     Sleep apnea     Syncope 2012     Past Surgical History:   Procedure Laterality Date    CARDIAC PACEMAKER PLACEMENT  2012    [id number]    DILATION AND CURETTAGE OF UTERUS      Hx of precancerous cells?    ENDOSCOPIC ULTRASOUND OF UPPER GASTROINTESTINAL TRACT N/A 2019    Procedure: ULTRASOUND, UPPER GI TRACT, ENDOSCOPIC;  Surgeon: Kev Calderon MD;  Location: [facility] ([address] [place] [address]);  Service: Endoscopy;  Laterality: N/A;  Pacemaker-Adam   appt confirmed-rb    TONSILLECTOMY      WISDOM TOOTH EXTRACTION       Social History     Tobacco Use    Smoking status: Former Smoker     Packs/day: 0.25     Years: 15.00     Pack years: 3.75     Last attempt to quit: 1982     Years since quittin.4    Smokeless tobacco: Never Used   Substance Use Topics    Alcohol use: Yes     Frequency: 2-4 times a month     Drinks per session: 1 or 2     Binge frequency: Never     Comment: occ     Family History   Adopted: Yes   Problem Relation Age of Onset    Amblyopia Neg Hx     Blindness Neg Hx     Cataracts Neg Hx      Glaucoma Neg Hx     Macular degeneration Neg Hx     Retinal detachment Neg Hx      No current facility-administered medications for this encounter.     Current Outpatient Medications:     aspirin (ECOTRIN) 81 MG EC tablet, Take 81 mg by mouth. 1 Tablet, Delayed Release (E.C.) Oral Every day, Disp: , Rfl:     buPROPion (WELLBUTRIN XL) 300 MG 24 hr tablet, Take 300 mg by mouth once daily. , Disp: , Rfl:     calcium-vitamin D 600 mg(1,500mg) -200 unit Tab, Take 1 tablet by mouth once daily. , Disp: , Rfl:     cholecalciferol, vitamin D3, (VITAMIN D3) 5,000 unit Tab, Take 5,000 Units by mouth once daily., Disp: , Rfl:     ferrous sulfate (FEOSOL) 325 mg (65 mg iron) Tab tablet, Take 325 mg by mouth daily with breakfast. Taking two times daily, Disp: , Rfl:     flu vacc [id number],65yr up,PF (FLUZONE HIGH-DOSE 2019-20, PF,) 180 mcg/0.5 mL Syrg, admin as directed, Disp: 0.5 mL, Rfl: 0    FLUoxetine (PROZAC) 20 MG capsule, Take 40 mg by mouth once daily. , Disp: , Rfl:     lisdexamfetamine (VYVANSE) 70 MG capsule, Take 70 mg by mouth every morning., Disp: , Rfl:     losartan (COZAAR) 25 MG tablet, Take 1 tablet (25 mg total) by mouth once daily., Disp: 30 tablet, Rfl: 3    metoprolol tartrate (LOPRESSOR) 25 MG tablet, TAKE 1 TABLET (25 MG TOTAL) BY MOUTH 2 (TWO) TIMES DAILY., Disp: 180 tablet, Rfl: 3    multivitamin-minerals-lutein (CENTRUM SILVER) Tab, Take by mouth. 1 Tablet Oral Every day, Disp: , Rfl:     pravastatin (PRAVACHOL) 10 MG tablet, TAKE 1 TABLET (10 MG TOTAL) BY MOUTH ONCE DAILY., Disp: 90 tablet, Rfl: 3  Patient has no known allergies.      Objective:          There were no vitals filed for this visit.    General Appearance:   Awake, Alert and Oriented. NAD. Appropriate affect and appearance     Neuro:   Spontaneous eye opening, appropriate verbal responses, follows commands  Pupils equal, round & brisk. EOMI, no proptosis, no spontaneous nystagmus  Face is symmetric, HB I, non-edematous and  SILT bilaterally  Vision grossly intact, Hearing grossly intact  HOWARD, normal tone    Head and Face:   Normocephalic, atraumatic, No facial deformities, skin is intact and non-erythematous    Ears:  Periauricular regions non-erythematous, non-fluctuanct non-tender  Pinna normal bilaterally, no skin lesions  EACs patent and without drainage bilaterally    Nose:    no skin lesions  marked external valve collapse with mild inspiration and a positive Loida maneuver  marked septal deviation to the left with 2+ turbinate      OC/OP:  Tongue midline on extension, non-edematous, soft  No labial, buccal, oral tongue or floor of mouth lesions  Soft palate symmetric, midline and without lesions or masses, tonsils symmetric  No masses or lesions of the visualized oropharynx    Neck:  Neck is symmetric, non-edematous, non-erythematous  Trachea is midline and easily palpable,  No palpable adenopathy or masses in levels I-VI    Glands:  Parotid and submandibular glands are symmetric and non-tender.   No thyroid nodules or masses, non-tender     Respiratory:  Normal work of breathing, no accessory muscle use, no stridor    Voice:  Normal vocal quality, volume, prosody and articulation         Recent Labs   Lab 12/13/19  1059   WBC 6.59   HGB 11.8*   HCT 36.6*         K 4.3   BUN 22   CREATININE 1.0   CALCIUM 10.3       Microbiology Results (last 7 days)     ** No results found for the last 168 hours. **                Assessment/Plan:      Nasal obstruction    To OR for surgical correction consisting of nasal reconstruction with bilateral auricular cartilage Batten grafts, septoplasty and submucous resection of turbinates.         North Hartmann, DO  Otorhinolaryngology-Head & Neck Surgery  [facility]  12/19/2019

## 2019-12-19 NOTE — ANESTHESIA POSTPROCEDURE EVALUATION
Anesthesia Post Evaluation    Patient: Manisha Wynne    Procedure(s) Performed: Procedure(s) (LRB):  SEPTOPLASTY, NOSE (N/A)  APPLICATION, CARTILAGE GRAFT AURICULAR BATTEN (Bilateral)  GRAFT- (Bilateral)  EXCISION, NASAL TURBINATE (Bilateral)    Final Anesthesia Type: general    Patient location during evaluation: PACU  Patient participation: Yes- Able to Participate  Level of consciousness: awake and alert  Post-procedure vital signs: reviewed and stable  Pain management: adequate  Airway patency: patent    PONV status at discharge: No PONV  Anesthetic complications: no      Cardiovascular status: blood pressure returned to baseline  Respiratory status: unassisted  Hydration status: euvolemic  Follow-up not needed.          Vitals Value Taken Time   /52 12/19/2019  1:00 PM   Temp 36.4 °C (97.6 °F) 12/19/2019 12:05 PM   Pulse 64 12/19/2019  1:00 PM   Resp 16 12/19/2019  1:00 PM   SpO2 91 % 12/19/2019  1:00 PM         Event Time     Out of Recovery 12:00:00          Pain/Corie Score: Pain Rating Prior to Med Admin: 7 (12/19/2019 11:15 AM)  Pain Rating Post Med Admin: 5 (12/19/2019 11:52 AM)  Corie Score: 9 (12/19/2019  1:00 PM)

## 2019-12-19 NOTE — DISCHARGE INSTRUCTIONS

## 2019-12-26 ENCOUNTER — OFFICE VISIT (OUTPATIENT)
Dept: OTOLARYNGOLOGY | Facility: CLINIC | Age: 69
End: 2019-12-26
Payer: MEDICARE

## 2019-12-26 ENCOUNTER — PATIENT MESSAGE (OUTPATIENT)
Dept: OTOLARYNGOLOGY | Facility: CLINIC | Age: 69
End: 2019-12-26

## 2019-12-26 DIAGNOSIS — J34.2 NASAL SEPTAL DEVIATION: ICD-10-CM

## 2019-12-26 DIAGNOSIS — J34.89 NASAL OBSTRUCTION: ICD-10-CM

## 2019-12-26 DIAGNOSIS — J34.3 NASAL TURBINATE HYPERTROPHY: ICD-10-CM

## 2019-12-26 DIAGNOSIS — M95.0 NASAL DEFORMITY, ACQUIRED: ICD-10-CM

## 2019-12-26 DIAGNOSIS — Z98.890 POST-OPERATIVE STATE: Primary | ICD-10-CM

## 2019-12-26 PROCEDURE — 99999 PR PBB SHADOW E&M-EST. PATIENT-LVL II: CPT | Mod: PBBFAC,HCNC,, | Performed by: NURSE PRACTITIONER

## 2019-12-26 PROCEDURE — 99024 POSTOP FOLLOW-UP VISIT: CPT | Mod: HCNC,S$GLB,, | Performed by: NURSE PRACTITIONER

## 2019-12-26 PROCEDURE — 99024 PR POST-OP FOLLOW-UP VISIT: ICD-10-PCS | Mod: HCNC,S$GLB,, | Performed by: NURSE PRACTITIONER

## 2019-12-26 PROCEDURE — 99999 PR PBB SHADOW E&M-EST. PATIENT-LVL II: ICD-10-PCS | Mod: PBBFAC,HCNC,, | Performed by: NURSE PRACTITIONER

## 2019-12-26 RX ORDER — MUPIROCIN 20 MG/G
OINTMENT TOPICAL 2 TIMES DAILY
Qty: 22 G | Refills: 0 | Status: SHIPPED | OUTPATIENT
Start: 2019-12-26 | End: 2020-01-02

## 2019-12-26 NOTE — PROGRESS NOTES
One week S/P nasal reconstruction with bilateral  grafts and osteotomies,septo,turbs.  All packs,splints,and sutures removed.  Septum straight,airway clear. Slight bilateral ear redness and swelling over incision sites.  Good dorsal contour with moderated edema.  Reviewed post op instructions including massages  Plan:   I ordered Bactroban for ear incision sites, take as instructed  Start nasal saline rinses as instructed  RTC 3 weeks with Dr. Flores

## 2019-12-30 ENCOUNTER — OFFICE VISIT (OUTPATIENT)
Dept: PSYCHIATRY | Facility: CLINIC | Age: 69
End: 2019-12-30
Payer: MEDICARE

## 2019-12-30 DIAGNOSIS — F98.8 ATTENTION DEFICIT DISORDER (ADD) IN ADULT: ICD-10-CM

## 2019-12-30 DIAGNOSIS — F33.41 MAJOR DEPRESSIVE DISORDER, RECURRENT EPISODE, IN PARTIAL REMISSION: Primary | ICD-10-CM

## 2019-12-30 PROCEDURE — 99999 PR PBB SHADOW E&M-EST. PATIENT-LVL I: ICD-10-PCS | Mod: PBBFAC,HCNC,, | Performed by: SOCIAL WORKER

## 2019-12-30 PROCEDURE — 99999 PR PBB SHADOW E&M-EST. PATIENT-LVL I: CPT | Mod: PBBFAC,HCNC,, | Performed by: SOCIAL WORKER

## 2019-12-30 PROCEDURE — 90834 PR PSYCHOTHERAPY W/PATIENT, 45 MIN: ICD-10-PCS | Mod: HCNC,S$GLB,, | Performed by: SOCIAL WORKER

## 2019-12-30 PROCEDURE — 90834 PSYTX W PT 45 MINUTES: CPT | Mod: HCNC,S$GLB,, | Performed by: SOCIAL WORKER

## 2019-12-30 NOTE — PROGRESS NOTES
"Individual Psychotherapy (PhD/LCSW)    12/30/2019    Site: [facility]    Therapeutic Intervention: Met with patient alone.  Outpatient - Insight oriented psychotherapy 45 min - CPT code 13347    Chief complaint/reason for encounter: depression, distractibility     Interval history and content of current session: Pt had expected to bounce back after surgery, finds she is slowed, unsteady, not eating well, still swollen. Remind pt of what she shares with other patients in terms of recovery from physical procedure, and encourage her to be gentle with herself and redefine "normal recovery" more realistically. Pt is receptive. Surgery went well.    Treatment plan:  · Target symptoms: depression, distractibility  · Why chosen therapy is appropriate versus another modality: relevant to diagnosis  · Outcome monitoring methods: self-report  · Therapeutic intervention type: insight oriented psychotherapy    Risk parameters:  Patient reports no suicidal ideation  Patient reports no homicidal ideation  Patient reports no self-injurious behavior  Patient reports no violent behavior    Verbal deficits: None    Patient's response to intervention:  The patient's response to intervention is motivated    Progress toward goals and other mental status changes:  The patient's progress toward goals is fair    Diagnosis: Major depression, recurrent, in partial remission,  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

## 2020-01-13 ENCOUNTER — DOCUMENTATION ONLY (OUTPATIENT)
Dept: PSYCHIATRY | Facility: CLINIC | Age: 70
End: 2020-01-13

## 2020-01-21 ENCOUNTER — TELEPHONE (OUTPATIENT)
Dept: OTOLARYNGOLOGY | Facility: CLINIC | Age: 70
End: 2020-01-21

## 2020-01-21 NOTE — TELEPHONE ENCOUNTER
----- Message from Kristofer Pappas, Patient Care Assistant sent at 1/21/2020  9:10 AM CST -----  Contact: JAYRO RUBY [[id number]]  Name of Who is Calling: JAYRO RUBY [[id number]]    What is the request in detail:Requesting to reschedule post op appointment.  Please contact to further discuss and advise      Can the clinic reply by [facility]: No    What [facility]:   [phone]

## 2020-01-23 ENCOUNTER — ANESTHESIA EVENT (OUTPATIENT)
Dept: SURGERY | Facility: HOSPITAL | Age: 70
End: 2020-01-23
Payer: MEDICARE

## 2020-01-23 ENCOUNTER — OFFICE VISIT (OUTPATIENT)
Dept: ORTHOPEDICS | Facility: CLINIC | Age: 70
End: 2020-01-23
Payer: MEDICARE

## 2020-01-23 ENCOUNTER — OFFICE VISIT (OUTPATIENT)
Dept: URGENT CARE | Facility: CLINIC | Age: 70
End: 2020-01-23
Payer: MEDICARE

## 2020-01-23 ENCOUNTER — TELEPHONE (OUTPATIENT)
Dept: ORTHOPEDICS | Facility: CLINIC | Age: 70
End: 2020-01-23

## 2020-01-23 VITALS
BODY MASS INDEX: 23.95 KG/M2 | WEIGHT: 158.06 LBS | SYSTOLIC BLOOD PRESSURE: 156 MMHG | HEIGHT: 68 IN | DIASTOLIC BLOOD PRESSURE: 77 MMHG | HEART RATE: 75 BPM

## 2020-01-23 VITALS
HEART RATE: 61 BPM | WEIGHT: 158 LBS | HEIGHT: 68 IN | OXYGEN SATURATION: 100 % | SYSTOLIC BLOOD PRESSURE: 162 MMHG | DIASTOLIC BLOOD PRESSURE: 65 MMHG | TEMPERATURE: 99 F | BODY MASS INDEX: 23.95 KG/M2 | RESPIRATION RATE: 20 BRPM
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
## 2020-01-23 DIAGNOSIS — M25.532 LEFT WRIST PAIN: ICD-10-CM

## 2020-01-23 DIAGNOSIS — S52.502A CLOSED FRACTURE OF DISTAL END OF LEFT RADIUS, UNSPECIFIED FRACTURE MORPHOLOGY, INITIAL ENCOUNTER: Primary | ICD-10-CM

## 2020-01-23 DIAGNOSIS — S52.552A OTHER CLOSED EXTRA-ARTICULAR FRACTURE OF DISTAL END OF LEFT RADIUS, INITIAL ENCOUNTER: Primary | ICD-10-CM

## 2020-01-23 DIAGNOSIS — S52.502A CLOSED FRACTURE OF LEFT DISTAL RADIUS: ICD-10-CM

## 2020-01-23 PROCEDURE — 3077F PR MOST RECENT SYSTOLIC BLOOD PRESSURE >= 140 MM HG: ICD-10-PCS | Mod: HCNC,CPTII,S$GLB, | Performed by: ORTHOPAEDIC SURGERY

## 2020-01-23 PROCEDURE — 99215 PR OFFICE/OUTPT VISIT, EST, LEVL V, 40-54 MIN: ICD-10-PCS | Mod: S$GLB,,, | Performed by: PHYSICIAN ASSISTANT

## 2020-01-23 PROCEDURE — 1100F PTFALLS ASSESS-DOCD GE2>/YR: CPT | Mod: HCNC,CPTII,S$GLB, | Performed by: ORTHOPAEDIC SURGERY

## 2020-01-23 PROCEDURE — 3288F PR FALLS RISK ASSESSMENT DOCUMENTED: ICD-10-PCS | Mod: HCNC,CPTII,S$GLB, | Performed by: ORTHOPAEDIC SURGERY

## 2020-01-23 PROCEDURE — 99999 PR PBB SHADOW E&M-EST. PATIENT-LVL V: CPT | Mod: PBBFAC,HCNC,, | Performed by: ORTHOPAEDIC SURGERY

## 2020-01-23 PROCEDURE — 1159F MED LIST DOCD IN RCRD: CPT | Mod: HCNC,S$GLB,, | Performed by: ORTHOPAEDIC SURGERY

## 2020-01-23 PROCEDURE — 99999 PR PBB SHADOW E&M-EST. PATIENT-LVL V: ICD-10-PCS | Mod: PBBFAC,HCNC,, | Performed by: ORTHOPAEDIC SURGERY

## 2020-01-23 PROCEDURE — 1159F PR MEDICATION LIST DOCUMENTED IN MEDICAL RECORD: ICD-10-PCS | Mod: HCNC,S$GLB,, | Performed by: ORTHOPAEDIC SURGERY

## 2020-01-23 PROCEDURE — 99213 OFFICE O/P EST LOW 20 MIN: CPT | Mod: 57,HCNC,S$GLB, | Performed by: ORTHOPAEDIC SURGERY

## 2020-01-23 PROCEDURE — 3077F SYST BP >= 140 MM HG: CPT | Mod: HCNC,CPTII,S$GLB, | Performed by: ORTHOPAEDIC SURGERY

## 2020-01-23 PROCEDURE — 73110 X-RAY EXAM OF WRIST: CPT | Mod: LT,S$GLB,, | Performed by: RADIOLOGY

## 2020-01-23 PROCEDURE — 1100F PR PT FALLS ASSESS DOC 2+ FALLS/FALL W/INJURY/YR: ICD-10-PCS | Mod: HCNC,CPTII,S$GLB, | Performed by: ORTHOPAEDIC SURGERY

## 2020-01-23 PROCEDURE — 1125F AMNT PAIN NOTED PAIN PRSNT: CPT | Mod: HCNC,S$GLB,, | Performed by: ORTHOPAEDIC SURGERY

## 2020-01-23 PROCEDURE — 99215 OFFICE O/P EST HI 40 MIN: CPT | Mod: S$GLB,,, | Performed by: PHYSICIAN ASSISTANT

## 2020-01-23 PROCEDURE — 3288F FALL RISK ASSESSMENT DOCD: CPT | Mod: HCNC,CPTII,S$GLB, | Performed by: ORTHOPAEDIC SURGERY

## 2020-01-23 PROCEDURE — 3078F DIAST BP <80 MM HG: CPT | Mod: HCNC,CPTII,S$GLB, | Performed by: ORTHOPAEDIC SURGERY

## 2020-01-23 PROCEDURE — 3078F PR MOST RECENT DIASTOLIC BLOOD PRESSURE < 80 MM HG: ICD-10-PCS | Mod: HCNC,CPTII,S$GLB, | Performed by: ORTHOPAEDIC SURGERY

## 2020-01-23 PROCEDURE — 99213 PR OFFICE/OUTPT VISIT, EST, LEVL III, 20-29 MIN: ICD-10-PCS | Mod: 57,HCNC,S$GLB, | Performed by: ORTHOPAEDIC SURGERY

## 2020-01-23 PROCEDURE — 1125F PR PAIN SEVERITY QUANTIFIED, PAIN PRESENT: ICD-10-PCS | Mod: HCNC,S$GLB,, | Performed by: ORTHOPAEDIC SURGERY

## 2020-01-23 PROCEDURE — 73110 XR WRIST COMPLETE 3 VIEWS LEFT: ICD-10-PCS | Mod: LT,S$GLB,, | Performed by: RADIOLOGY

## 2020-01-23 RX ORDER — MUPIROCIN 20 MG/G
OINTMENT TOPICAL
Status: CANCELLED | OUTPATIENT
Start: 2020-01-23

## 2020-01-23 RX ORDER — SODIUM CHLORIDE 9 MG/ML
INJECTION, SOLUTION INTRAVENOUS CONTINUOUS
Status: CANCELLED | OUTPATIENT
Start: 2020-01-23

## 2020-01-23 RX ORDER — LIDOCAINE HYDROCHLORIDE 10 MG/ML
1 INJECTION, SOLUTION EPIDURAL; INFILTRATION; INTRACAUDAL; PERINEURAL ONCE
Status: CANCELLED | OUTPATIENT
Start: 2020-01-23 | End: 2020-01-23

## 2020-01-23 NOTE — PROGRESS NOTES
Subjective:      Manisha Wynne is a 69 y.o. female referred by urgent care for evaluation and treatment of left wrist pain. This is evaluated as a personal injury. The pain began last night  ago. The pain is located primarily in the radial area.  She describes the symptoms as aching and splint. Symptoms improve with rest. The symptoms are worse with Nothing. The patient  does not have neck pain. The patient is active in none. Treatment to date has been brace  , without significant relief. Pt fell last night was seen in urgent care today and referred to our clinic. No reduction done.     Outside reports reviewed: ER records.    Past Medical History:   Diagnosis Date    Abnormal Pap smear     Atrial fibrillation     AV block, 1st degree 7/25/2012    Cataract     Depression 7/24/2012    Facet arthritis of lumbar region 3/31/2015    Falls     3 falls in the last 6 mos--noted 6/19/19    Hyperlipidemia     Hypertension 7/24/2012    Neuropathy     Other specified cardiac dysrhythmias(427.89)     Sleep apnea     Syncope 7/24/2012     Past Surgical History:   Procedure Laterality Date    APPLICATION OF CARTILAGE GRAFT Bilateral 12/19/2019    Procedure: APPLICATION, CARTILAGE GRAFT AURICULAR JEZ;  Surgeon: Martinez Flores III, MD;  Location: [facility];  Service: ENT;  Laterality: Bilateral;    CARDIAC PACEMAKER PLACEMENT  09/07/2012    [id number]    DILATION AND CURETTAGE OF UTERUS      Hx of precancerous cells?    ENDOSCOPIC ULTRASOUND OF UPPER GASTROINTESTINAL TRACT N/A 7/5/2019    Procedure: ULTRASOUND, UPPER GI TRACT, ENDOSCOPIC;  Surgeon: Kev Calderon MD;  Location: [address]);  Service: Endoscopy;  Laterality: N/A;  Pacemaker-Aadm   appt confirmed-rb    NASAL SEPTOPLASTY N/A 12/19/2019    Procedure: SEPTOPLASTY, NOSE;  Surgeon: Martinez Flores III, MD;  Location: [facility];  Service: ENT;  Laterality: N/A;  FOLLOW DR SALVATORE KIMBROUGH PROTOCOL    SURGICAL REMOVAL OF  NASAL TURBINATE Bilateral 2019    Procedure: EXCISION, NASAL TURBINATE;  Surgeon: Martinez Flores III, MD;  Location: [facility];  Service: ENT;  Laterality: Bilateral;    TONSILLECTOMY      WISDOM TOOTH EXTRACTION       Social History     Socioeconomic History    Marital status: Single     Spouse name: Not on file    Number of children: Not on file    Years of education: Not on file    Highest education level: Not on file   Occupational History    Occupation: /Rabbi     Employer: [employer]   Social Needs    Financial resource strain: Somewhat hard    Food insecurity:     Worry: Never true     Inability: Never true    Transportation needs:     Medical: No     Non-medical: Yes   Tobacco Use    Smoking status: Former Smoker     Packs/day: 0.25     Years: 15.00     Pack years: 3.75     Last attempt to quit: 1982     Years since quittin.5    Smokeless tobacco: Never Used   Substance and Sexual Activity    Alcohol use: Yes     Frequency: 2-4 times a month     Drinks per session: 1 or 2     Binge frequency: Never     Comment: occ    Drug use: No    Sexual activity: Not Currently   Lifestyle    Physical activity:     Days per week: 1 day     Minutes per session: 20 min    Stress: Only a little   Relationships    Social connections:     Talks on phone: More than three times a week     Gets together: Once a week     Attends [employer] service: Not on file     Active member of club or organization: Yes     Attends meetings of clubs or organizations: 1 to 4 times per year     Relationship status:    Other Topics Concern    Patient feels they ought to cut down on drinking/drug use Not Asked    Patient annoyed by others criticizing their drinking/drug use Not Asked    Patient has felt bad or guilty about drinking/drug use Not Asked    Patient has had a drink/used drugs as an eye opener in the AM Not Asked   Social History Narrative    Not on file     Family  History   Adopted: Yes   Problem Relation Age of Onset    Amblyopia Neg Hx     Blindness Neg Hx     Cataracts Neg Hx     Glaucoma Neg Hx     Macular degeneration Neg Hx     Retinal detachment Neg Hx        Current Outpatient Medications:     aspirin (ECOTRIN) 81 MG EC tablet, Take 81 mg by mouth. 1 Tablet, Delayed Release (E.C.) Oral Every day, Disp: , Rfl:     buPROPion (WELLBUTRIN XL) 300 MG 24 hr tablet, Take 300 mg by mouth once daily. , Disp: , Rfl:     calcium-vitamin D 600 mg(1,500mg) -200 unit Tab, Take 1 tablet by mouth once daily. , Disp: , Rfl:     cholecalciferol, vitamin D3, (VITAMIN D3) 5,000 unit Tab, Take 5,000 Units by mouth once daily., Disp: , Rfl:     ferrous sulfate (FEOSOL) 325 mg (65 mg iron) Tab tablet, Take 325 mg by mouth daily with breakfast. Taking two times daily, Disp: , Rfl:     flu vacc [id number],65yr up,PF (FLUZONE HIGH-DOSE 2019-20, PF,) 180 mcg/0.5 mL Syrg, admin as directed, Disp: 0.5 mL, Rfl: 0    FLUoxetine (PROZAC) 20 MG capsule, Take 40 mg by mouth once daily. , Disp: , Rfl:     HYDROcodone-acetaminophen (NORCO) 5-325 mg per tablet, Take 1 tablet by mouth every 6 (six) hours as needed for Pain., Disp: 15 tablet, Rfl: 0    ibuprofen (ADVIL,MOTRIN) 600 MG tablet, Take 1 tablet (600 mg total) by mouth every 6 (six) hours as needed for Pain., Disp: 30 tablet, Rfl: 0    lisdexamfetamine (VYVANSE) 70 MG capsule, Take 70 mg by mouth every morning., Disp: , Rfl:     metoprolol tartrate (LOPRESSOR) 25 MG tablet, TAKE 1 TABLET (25 MG TOTAL) BY MOUTH 2 (TWO) TIMES DAILY., Disp: 180 tablet, Rfl: 3    multivitamin-minerals-lutein (CENTRUM SILVER) Tab, Take by mouth. 1 Tablet Oral Every day, Disp: , Rfl:     ondansetron (ZOFRAN-ODT) 8 MG TbDL, Take 1 tablet (8 mg total) by mouth every 6 (six) hours as needed (Nausea)., Disp: 15 tablet, Rfl: 0    pravastatin (PRAVACHOL) 10 MG tablet, TAKE 1 TABLET (10 MG TOTAL) BY MOUTH ONCE DAILY., Disp: 90 tablet, Rfl: 3    losartan  (COZAAR) 25 MG tablet, Take 1 tablet (25 mg total) by mouth once daily., Disp: 30 tablet, Rfl: 3  Review of patient's allergies indicates:  No Known Allergies          Objective:         General :    alert, appears stated age and cooperative   Gait:  Abnormal. The patient cannot bear weight on the injured extremity.   Left Upper Extremity  Swelling:  present in fingers but still has wrinkles Pt in splint. NVI                                         Imaging  X-Ray: taken and reviewed,    Results: displaced DR fx     Fracture Colles     Fracture status Colles acute    Joint space narrowing mild    Osteoporosis     Sclerosis: absent    Lucency: none      Assessment:      left wrist pain.      Plan:      Questions solicited and answered..  Surgical treatment deemed necessary- no reduction performed on a displaced DR fx.

## 2020-01-23 NOTE — PROGRESS NOTES
"Subjective:       Patient ID: Manisha Wynne is a 69 y.o. female.    Vitals:  height is 5' 8" (1.727 m) and weight is 71.7 kg (158 lb). Her temperature is 98.8 °F (37.1 °C). Her blood pressure is 162/65 (abnormal) and her pulse is 61. Her respiration is 20 and oxygen saturation is 100%.     Chief Complaint: Wrist Injury    This is a right handed 70 yo female who presents today for left wrist injury. She states that she has frequent falls, and last night she slipped when she was going to bed. This was a mechanical fall as she slipped on a phone screen protector. She hit the side of her face (left side) on the night stand. No loss of consciousness or amnesia. She fell to the left side and backwards and caught herself with her left hand-FOOSH injury. She woke up this morning with increased pain and swelling. She took tylenol last night but nothing today.     Wrist Injury    The incident occurred 6 to 12 hours ago. The incident occurred at home. The injury mechanism was a fall. The pain is present in the left wrist. The quality of the pain is described as aching. The pain is at a severity of 7/10. Pertinent negatives include no chest pain.       Constitution: Negative for chills, sweating, fatigue and fever.   HENT: Negative for congestion and sore throat.    Neck: Negative for painful lymph nodes.   Cardiovascular: Negative for chest pain and leg swelling.   Eyes: Negative for double vision and blurred vision.   Respiratory: Negative for cough and shortness of breath.    Gastrointestinal: Negative for nausea, vomiting and diarrhea.   Genitourinary: Negative for dysuria, frequency, urgency and history of kidney stones.   Musculoskeletal: Positive for pain, trauma, joint pain and joint swelling. Negative for muscle cramps and muscle ache.   Skin: Negative for color change, pale, rash and bruising.   Allergic/Immunologic: Negative for seasonal allergies.   Neurological: Negative for dizziness, history of vertigo, " light-headedness, passing out and headaches.   Hematologic/Lymphatic: Negative for swollen lymph nodes.   Psychiatric/Behavioral: Negative for nervous/anxious, sleep disturbance and depression. The patient is not nervous/anxious.        Objective:      Physical Exam   Constitutional: She is oriented to person, place, and time. She appears well-developed and well-nourished. She is cooperative.  Non-toxic appearance. She does not appear ill. No distress.   HENT:   Head: Normocephalic and atraumatic. Head is without abrasion, without contusion and without laceration.   Right Ear: Hearing, tympanic membrane, external ear and ear canal normal. No hemotympanum.   Left Ear: Hearing, tympanic membrane, external ear and ear canal normal. No hemotympanum.   Nose: Nose normal. No mucosal edema, rhinorrhea or nasal deformity. No epistaxis. Right sinus exhibits no maxillary sinus tenderness and no frontal sinus tenderness. Left sinus exhibits no maxillary sinus tenderness and no frontal sinus tenderness.   Mouth/Throat: Uvula is midline, oropharynx is clear and moist and mucous membranes are normal. No trismus in the jaw. Normal dentition. No uvula swelling. No posterior oropharyngeal erythema.   Eyes: Pupils are equal, round, and reactive to light. Conjunctivae, EOM and lids are normal. Right eye exhibits no discharge. Left eye exhibits no discharge. No scleral icterus.   Neck: Trachea normal, normal range of motion, full passive range of motion without pain and phonation normal. Neck supple. No spinous process tenderness and no muscular tenderness present. No neck rigidity. No tracheal deviation present.   Cardiovascular: Normal rate, regular rhythm, normal heart sounds, intact distal pulses and normal pulses.   Pulmonary/Chest: Effort normal and breath sounds normal. No respiratory distress.   Abdominal: Soft. Normal appearance and bowel sounds are normal. She exhibits no distension, no pulsatile midline mass and no mass.  There is no tenderness.   Musculoskeletal: She exhibits no edema.        Left wrist: She exhibits decreased range of motion, tenderness, bony tenderness, swelling and deformity. She exhibits no effusion, no crepitus and no laceration.        Arms:       Left hand: She exhibits normal range of motion, no tenderness, no bony tenderness, normal capillary refill, no deformity, no laceration and no swelling. Normal sensation noted. Decreased strength noted. She exhibits finger abduction and wrist extension trouble. She exhibits no thumb/finger opposition.   Neurological: She is alert and oriented to person, place, and time. She has normal strength. She is not disoriented. She displays no tremor. No cranial nerve deficit or sensory deficit. She exhibits normal muscle tone. She displays no seizure activity. Coordination and gait normal. GCS eye subscore is 4. GCS verbal subscore is 5. GCS motor subscore is 6.   Alert, oriented x 3. EOMI, PERRLA. Cranial nerves intact: facial expressions (smile, raising eyebrows, shutting eyes, pursed lips) symmetric. Shoulder shrug strength 5/5; sternocleidomastoid muscle strength 5/5 bilaterally. Jaw is midline without deviation. Tongue protrudes at midline without fasciculations.  Uvula rises at midline. Sensation to face in distribution of CN V1, V2, and V3 intact. Sensation to upper and lower extremities intact. Patient ambulates unassisted without rigidity or ataxia.Voice quality, comprehension, articulation, coherence assessed as appropriate.  Uvula rises at midline.       Skin: Skin is warm, dry, intact, not diaphoretic and not pale. Capillary refill takes less than 2 seconds. abrasion, burn, bruising and ecchymosis  Psychiatric: She has a normal mood and affect. Her speech is normal and behavior is normal. Judgment and thought content normal. Cognition and memory are normal.   Nursing note and vitals reviewed.          X-ray Wrist Complete Left    Result Date:  1/23/2020  EXAMINATION: XR WRIST COMPLETE 3 VIEWS LEFT CLINICAL HISTORY: Unspecified fall, initial encounter TECHNIQUE: PA, lateral, and oblique views of the left wrist were performed. FINDINGS: There is a comminuted somewhat displaced fracture of the distal radial metaphysis with dorsal angulation of the distal fracture fragments which does not extend into the radiocarpal joint.     As above. Electronically signed by: Timothy Forrest MD Date:    01/23/2020 Time:    12:17    Patient neurovascularly intact at this time.  Fracture is comminuted and angulated of distal radius.  Called clinic  to get patient seen in Hand Clinic ASAP.  I called and spoke with hand Clinic as well and patient got an appointment for 115 at [facility] with Dr. Moe.  We placed patient in sugar-tong splint and applied sling.  I instructed patient to get a ride the patient states that she will be fine driving.  She left from clinic and is going straight to her appointment at the hand clinic.  Assessment:       1. Closed fracture of distal end of left radius, unspecified fracture morphology, initial encounter    2. Fall, initial encounter    3. Left wrist pain        Plan:         Closed fracture of distal end of left radius, unspecified fracture morphology, initial encounter  -     Ambulatory referral to Hand Surgery  -     SPLINT APPLICATION; Future  -     SLING FOR HOME USE    Fall, initial encounter  -     X-Ray Wrist Complete Left; Future; Expected date: 01/23/2020    Left wrist pain  -     X-Ray Wrist Complete Left; Future; Expected date: 01/23/2020      Patient Instructions     -go immediately to your hand clinic appointment.      Understanding a Distal Radius Fracture      A fracture is a broken bone. A fracture in the distal radius is a break in the lower end of the radius. This is the larger bone in the forearm. Because the break occurs near the wrist, it is often called a wrist fracture.    The bone may be  cracked, or it may be broken into 2 or more pieces. The pieces of bone may be lined up or they may have moved out of place. Sometimes, the bone may break through the skin. Nearby nerves, tissues, and joints also may be damaged. Depending on the severity of the fracture, healing may take several months or longer.  What causes a distal radius fracture?  This type of fracture is most often caused from a fall on an outstretched hand. It can also be caused from a blow, accident, or sports injury.  Symptoms of a distal radius fracture  Symptoms can include pain, swelling, and bruising. If the bone breaks through the skin, external bleeding can also occur. The wrist may look crooked, deformed, or bent. It may be hard to move or use the arm, wrist, and hand for normal tasks and activities.  Treating a distal radius fracture  Treatment depends on how serious the fracture is. If needed, the bone is put back into place. This may be done with or without surgery. If surgery is needed, the surgeon may use devices such as pins, plates, or screws to hold the bone together. You may need to wear a splint or cast for a month or longer to protect the bone and keep it in place during healing. Other treatments may be also used to help reduce symptoms or regain function. These include:  · Cold packs. Putting an ice pack on the injured area may help reduce swelling and pain.  · Raising the arm and wrist. Keeping the arm and wrist raised above heart level may help reduce swelling.  · Pain medicines. Taking prescription or over-the-counter pain medicines may help reduce pain and swelling.  · Exercises. Doing certain exercises at home or with a physical therapist can help restore strength, flexibility, and range of motion in your arm, wrist, and hand. In general, exercises are not started until after the splint or cast is removed.  Possible complications of a distal radius fracture  These can include:  · Poor healing of the bone  · Weakness,  stiffness, or loss of range of motion in the arm, wrist, or hand  · Osteoarthritis in the wrist joint  When to call your healthcare provider  Call your healthcare provider right away if you have any of these:  · Fever of 100.4°F (38°C) or higher, or as directed  · Symptoms that dont get better with treatment, or get worse  · Numbness, coldness, or swelling in your arm, hand, or fingers  · Fingernails that turn blue or gray in color  · A splint or cast that is damaged or feels too tight or loose  · New symptoms   Date Last Reviewed: 3/10/2016  © 9781-5738 [employer]. [address] [place] [address], [place] [address]. All rights reserved. This information is not intended as a substitute for professional medical care. Always follow your healthcare professional's instructions.

## 2020-01-23 NOTE — H&P (VIEW-ONLY)
"Subjective:       Patient ID: Manisha Wynne is a 69 y.o. female.    Vitals:  height is 5' 8" (1.727 m) and weight is 71.7 kg (158 lb). Her temperature is 98.8 °F (37.1 °C). Her blood pressure is 162/65 (abnormal) and her pulse is 61. Her respiration is 20 and oxygen saturation is 100%.     Chief Complaint: Wrist Injury    Pt fell last night while getting in bed. She did not fall out of the bed. She hit her head on the edge of the nightstand , trying to catch herself with her left hand. This morning her wrist was in severe pain and swollen. She cant flex her wrist and it feels warm to touch.    Wrist Injury    The incident occurred 6 to 12 hours ago. The incident occurred at home. The injury mechanism was a fall. The pain is present in the left wrist. The quality of the pain is described as aching. The pain is at a severity of 7/10. Pertinent negatives include no chest pain.       Constitution: Negative for chills, fatigue and fever.   HENT: Negative for congestion and sore throat.    Neck: Negative for painful lymph nodes.   Cardiovascular: Negative for chest pain and leg swelling.   Eyes: Negative for double vision and blurred vision.   Respiratory: Negative for cough and shortness of breath.    Gastrointestinal: Negative for nausea, vomiting and diarrhea.   Genitourinary: Negative for dysuria, frequency, urgency and history of kidney stones.   Musculoskeletal: Negative for joint pain, joint swelling, muscle cramps and muscle ache.   Skin: Negative for color change, pale, rash and bruising.   Allergic/Immunologic: Negative for seasonal allergies.   Neurological: Negative for dizziness, history of vertigo, light-headedness, passing out and headaches.   Hematologic/Lymphatic: Negative for swollen lymph nodes.   Psychiatric/Behavioral: Negative for nervous/anxious, sleep disturbance and depression. The patient is not nervous/anxious.        Objective:      Physical Exam   Constitutional: She is oriented to " person, place, and time. She appears well-developed and well-nourished. She is cooperative.  Non-toxic appearance. She does not appear ill. No distress.   HENT:   Head: Normocephalic and atraumatic. Head is without abrasion, without contusion and without laceration.   Right Ear: Hearing, tympanic membrane, external ear and ear canal normal. No hemotympanum.   Left Ear: Hearing, tympanic membrane, external ear and ear canal normal. No hemotympanum.   Nose: Nose normal. No mucosal edema, rhinorrhea or nasal deformity. No epistaxis. Right sinus exhibits no maxillary sinus tenderness and no frontal sinus tenderness. Left sinus exhibits no maxillary sinus tenderness and no frontal sinus tenderness.   Mouth/Throat: Uvula is midline, oropharynx is clear and moist and mucous membranes are normal. No trismus in the jaw. Normal dentition. No uvula swelling. No posterior oropharyngeal erythema.   Eyes: Pupils are equal, round, and reactive to light. Conjunctivae, EOM and lids are normal. Right eye exhibits no discharge. Left eye exhibits no discharge. No scleral icterus.   Neck: Trachea normal, normal range of motion, full passive range of motion without pain and phonation normal. Neck supple. No spinous process tenderness and no muscular tenderness present. No neck rigidity. No tracheal deviation present.   Cardiovascular: Normal rate, regular rhythm, normal heart sounds, intact distal pulses and normal pulses.   Pulmonary/Chest: Effort normal and breath sounds normal. No respiratory distress.   Abdominal: Soft. Normal appearance and bowel sounds are normal. She exhibits no distension, no pulsatile midline mass and no mass. There is no tenderness.   Musculoskeletal: She exhibits no edema.        Left wrist: She exhibits decreased range of motion, tenderness, bony tenderness, swelling and deformity. She exhibits no effusion, no crepitus and no laceration.        Arms:       Left hand: She exhibits normal range of motion, no  tenderness, no bony tenderness, normal capillary refill, no deformity, no laceration and no swelling. Normal sensation noted. Decreased strength noted. She exhibits finger abduction and wrist extension trouble. She exhibits no thumb/finger opposition.   Neurological: She is alert and oriented to person, place, and time. She has normal strength. No cranial nerve deficit or sensory deficit. She exhibits normal muscle tone. She displays no seizure activity. Coordination normal. GCS eye subscore is 4. GCS verbal subscore is 5. GCS motor subscore is 6.   Skin: Skin is warm, dry, intact, not diaphoretic and not pale. Capillary refill takes less than 2 seconds. abrasion, burn, bruising and ecchymosis  Psychiatric: She has a normal mood and affect. Her speech is normal and behavior is normal. Judgment and thought content normal. Cognition and memory are normal.   Nursing note and vitals reviewed.          X-ray Wrist Complete Left    Result Date: 1/23/2020  EXAMINATION: XR WRIST COMPLETE 3 VIEWS LEFT CLINICAL HISTORY: Unspecified fall, initial encounter TECHNIQUE: PA, lateral, and oblique views of the left wrist were performed. FINDINGS: There is a comminuted somewhat displaced fracture of the distal radial metaphysis with dorsal angulation of the distal fracture fragments which does not extend into the radiocarpal joint.     As above. Electronically signed by: Timothy Forrest MD Date:    01/23/2020 Time:    12:17    Patient neurovascularly intact at this time.  Fracture is comminuted and angulated of distal radius.  Called clinic  to get patient seen in Hand Clinic ASAP.  I called and spoke with hand Clinic as well and patient got an appointment for 115 at [facility] with Dr. Moe.  We placed patient in sugar-tong splint and applied sling.  I instructed patient to get a ride the patient states that she will be fine driving.  She left from clinic and is going straight to her appointment at the  hand clinic.  Assessment:       1. Closed fracture of distal end of left radius, unspecified fracture morphology, initial encounter    2. Fall, initial encounter    3. Left wrist pain        Plan:         Closed fracture of distal end of left radius, unspecified fracture morphology, initial encounter  -     Ambulatory referral to Hand Surgery  -     SPLINT APPLICATION; Future  -     SLING FOR HOME USE    Fall, initial encounter  -     X-Ray Wrist Complete Left; Future; Expected date: 01/23/2020    Left wrist pain  -     X-Ray Wrist Complete Left; Future; Expected date: 01/23/2020      Patient Instructions     -go immediately to your hand clinic appointment.      Understanding a Distal Radius Fracture      A fracture is a broken bone. A fracture in the distal radius is a break in the lower end of the radius. This is the larger bone in the forearm. Because the break occurs near the wrist, it is often called a wrist fracture.    The bone may be cracked, or it may be broken into 2 or more pieces. The pieces of bone may be lined up or they may have moved out of place. Sometimes, the bone may break through the skin. Nearby nerves, tissues, and joints also may be damaged. Depending on the severity of the fracture, healing may take several months or longer.  What causes a distal radius fracture?  This type of fracture is most often caused from a fall on an outstretched hand. It can also be caused from a blow, accident, or sports injury.  Symptoms of a distal radius fracture  Symptoms can include pain, swelling, and bruising. If the bone breaks through the skin, external bleeding can also occur. The wrist may look crooked, deformed, or bent. It may be hard to move or use the arm, wrist, and hand for normal tasks and activities.  Treating a distal radius fracture  Treatment depends on how serious the fracture is. If needed, the bone is put back into place. This may be done with or without surgery. If surgery is needed, the  surgeon may use devices such as pins, plates, or screws to hold the bone together. You may need to wear a splint or cast for a month or longer to protect the bone and keep it in place during healing. Other treatments may be also used to help reduce symptoms or regain function. These include:  · Cold packs. Putting an ice pack on the injured area may help reduce swelling and pain.  · Raising the arm and wrist. Keeping the arm and wrist raised above heart level may help reduce swelling.  · Pain medicines. Taking prescription or over-the-counter pain medicines may help reduce pain and swelling.  · Exercises. Doing certain exercises at home or with a physical therapist can help restore strength, flexibility, and range of motion in your arm, wrist, and hand. In general, exercises are not started until after the splint or cast is removed.  Possible complications of a distal radius fracture  These can include:  · Poor healing of the bone  · Weakness, stiffness, or loss of range of motion in the arm, wrist, or hand  · Osteoarthritis in the wrist joint  When to call your healthcare provider  Call your healthcare provider right away if you have any of these:  · Fever of 100.4°F (38°C) or higher, or as directed  · Symptoms that dont get better with treatment, or get worse  · Numbness, coldness, or swelling in your arm, hand, or fingers  · Fingernails that turn blue or gray in color  · A splint or cast that is damaged or feels too tight or loose  · New symptoms   Date Last Reviewed: 3/10/2016  © 3097-8936 [employer]. [address] [place] [address], [place] [address]. All rights reserved. This information is not intended as a substitute for professional medical care. Always follow your healthcare professional's instructions.

## 2020-01-23 NOTE — LETTER
January 30, 2020      Dean Ha PA-C  [address]  [place] [address]           [facility] [place] [address] [facility] [address]  [address], [address]  [facility] [address]  Phone: [phone]          Patient: Manisha Wynne   MR Number: [id number]   YOB: 1950   Date of Visit: 1/23/2020       Dear Dean Ha:    Thank you for referring Manisha Wynne to me for evaluation. Attached you will find relevant portions of my assessment and plan of care.    If you have questions, please do not hesitate to call me. I look forward to following Manisha Wynne along with you.    Sincerely,    Jj Gamble  CC:  No Recipients    If you would like to receive this communication electronically, please contact [email or web address] or [phone] to request more information on [employer] access.    For providers and/or their staff who would like to refer a patient to [facility], please contact us through our one-stop-shop provider referral line, [facility], at [phone].    If you feel you have received this communication in error or would no longer like to receive these types of communications, please e-mail [email or web address]

## 2020-01-23 NOTE — PATIENT INSTRUCTIONS
-go immediately to your hand clinic appointment.      Understanding a Distal Radius Fracture      A fracture is a broken bone. A fracture in the distal radius is a break in the lower end of the radius. This is the larger bone in the forearm. Because the break occurs near the wrist, it is often called a wrist fracture.    The bone may be cracked, or it may be broken into 2 or more pieces. The pieces of bone may be lined up or they may have moved out of place. Sometimes, the bone may break through the skin. Nearby nerves, tissues, and joints also may be damaged. Depending on the severity of the fracture, healing may take several months or longer.  What causes a distal radius fracture?  This type of fracture is most often caused from a fall on an outstretched hand. It can also be caused from a blow, accident, or sports injury.  Symptoms of a distal radius fracture  Symptoms can include pain, swelling, and bruising. If the bone breaks through the skin, external bleeding can also occur. The wrist may look crooked, deformed, or bent. It may be hard to move or use the arm, wrist, and hand for normal tasks and activities.  Treating a distal radius fracture  Treatment depends on how serious the fracture is. If needed, the bone is put back into place. This may be done with or without surgery. If surgery is needed, the surgeon may use devices such as pins, plates, or screws to hold the bone together. You may need to wear a splint or cast for a month or longer to protect the bone and keep it in place during healing. Other treatments may be also used to help reduce symptoms or regain function. These include:  · Cold packs. Putting an ice pack on the injured area may help reduce swelling and pain.  · Raising the arm and wrist. Keeping the arm and wrist raised above heart level may help reduce swelling.  · Pain medicines. Taking prescription or over-the-counter pain medicines may help reduce pain and swelling.  · Exercises. Doing  certain exercises at home or with a physical therapist can help restore strength, flexibility, and range of motion in your arm, wrist, and hand. In general, exercises are not started until after the splint or cast is removed.  Possible complications of a distal radius fracture  These can include:  · Poor healing of the bone  · Weakness, stiffness, or loss of range of motion in the arm, wrist, or hand  · Osteoarthritis in the wrist joint  When to call your healthcare provider  Call your healthcare provider right away if you have any of these:  · Fever of 100.4°F (38°C) or higher, or as directed  · Symptoms that dont get better with treatment, or get worse  · Numbness, coldness, or swelling in your arm, hand, or fingers  · Fingernails that turn blue or gray in color  · A splint or cast that is damaged or feels too tight or loose  · New symptoms   Date Last Reviewed: 3/10/2016  © 7373-2193 [employer]. [address] [place] [address]. All rights reserved. This information is not intended as a substitute for professional medical care. Always follow your healthcare professional's instructions.

## 2020-01-24 ENCOUNTER — ANESTHESIA (OUTPATIENT)
Dept: SURGERY | Facility: HOSPITAL | Age: 70
End: 2020-01-24
Payer: MEDICARE

## 2020-01-24 ENCOUNTER — HOSPITAL ENCOUNTER (OUTPATIENT)
Facility: HOSPITAL | Age: 70
Discharge: HOME | End: 2020-01-24
Attending: ORTHOPAEDIC SURGERY | Admitting: ORTHOPAEDIC SURGERY
Payer: MEDICARE

## 2020-01-24 ENCOUNTER — TELEPHONE (OUTPATIENT)
Dept: ELECTROPHYSIOLOGY | Facility: CLINIC | Age: 70
End: 2020-01-24

## 2020-01-24 DIAGNOSIS — S52.552A OTHER CLOSED EXTRA-ARTICULAR FRACTURE OF DISTAL END OF LEFT RADIUS, INITIAL ENCOUNTER: ICD-10-CM

## 2020-01-24 DIAGNOSIS — Z45.010 ENCOUNTER FOR CHECKING AND TESTING OF CARDIAC PACEMAKER PULSE GENERATOR (BATTERY): ICD-10-CM

## 2020-01-24 DIAGNOSIS — S52.502A CLOSED FRACTURE OF LEFT DISTAL RADIUS: ICD-10-CM

## 2020-01-24 PROCEDURE — 99900035 HC TECH TIME PER 15 MIN (STAT)

## 2020-01-24 PROCEDURE — 25000003 PHARM REV CODE 250: Performed by: ORTHOPAEDIC SURGERY

## 2020-01-24 PROCEDURE — 25000003 PHARM REV CODE 250

## 2020-01-24 PROCEDURE — 27201423 OPTIME MED/SURG SUP & DEVICES STERILE SUPPLY: Performed by: ORTHOPAEDIC SURGERY

## 2020-01-24 PROCEDURE — 64415 NJX AA&/STRD BRCH PLXS IMG: CPT | Performed by: STUDENT IN AN ORGANIZED HEALTH CARE EDUCATION/TRAINING PROGRAM

## 2020-01-24 PROCEDURE — 63600175 PHARM REV CODE 636 W HCPCS: Performed by: NURSE ANESTHETIST, CERTIFIED REGISTERED

## 2020-01-24 PROCEDURE — D9220A PRA ANESTHESIA: Mod: ANES,,, | Performed by: ANESTHESIOLOGY

## 2020-01-24 PROCEDURE — 36000711: Performed by: ORTHOPAEDIC SURGERY

## 2020-01-24 PROCEDURE — C1713 ANCHOR/SCREW BN/BN,TIS/BN: HCPCS | Performed by: ORTHOPAEDIC SURGERY

## 2020-01-24 PROCEDURE — 64415 NJX AA&/STRD BRCH PLXS IMG: CPT | Mod: 59,LT,, | Performed by: ANESTHESIOLOGY

## 2020-01-24 PROCEDURE — 25000003 PHARM REV CODE 250: Performed by: NURSE ANESTHETIST, CERTIFIED REGISTERED

## 2020-01-24 PROCEDURE — 76942 PR U/S GUIDANCE FOR NEEDLE GUIDANCE: ICD-10-PCS | Mod: 26,,, | Performed by: ANESTHESIOLOGY

## 2020-01-24 PROCEDURE — D9220A PRA ANESTHESIA: ICD-10-PCS | Mod: ANES,,, | Performed by: ANESTHESIOLOGY

## 2020-01-24 PROCEDURE — 63600175 PHARM REV CODE 636 W HCPCS: Performed by: ORTHOPAEDIC SURGERY

## 2020-01-24 PROCEDURE — 71000033 HC RECOVERY, INTIAL HOUR: Performed by: ORTHOPAEDIC SURGERY

## 2020-01-24 PROCEDURE — 36000710: Performed by: ORTHOPAEDIC SURGERY

## 2020-01-24 PROCEDURE — 25000003 PHARM REV CODE 250: Performed by: ANESTHESIOLOGY

## 2020-01-24 PROCEDURE — 76942 ECHO GUIDE FOR BIOPSY: CPT | Performed by: STUDENT IN AN ORGANIZED HEALTH CARE EDUCATION/TRAINING PROGRAM

## 2020-01-24 PROCEDURE — 71000015 HC POSTOP RECOV 1ST HR: Performed by: ORTHOPAEDIC SURGERY

## 2020-01-24 PROCEDURE — 94761 N-INVAS EAR/PLS OXIMETRY MLT: CPT

## 2020-01-24 PROCEDURE — D9220A PRA ANESTHESIA: Mod: CRNA,,, | Performed by: NURSE ANESTHETIST, CERTIFIED REGISTERED

## 2020-01-24 PROCEDURE — 37000009 HC ANESTHESIA EA ADD 15 MINS: Performed by: ORTHOPAEDIC SURGERY

## 2020-01-24 PROCEDURE — 27200651 HC AIRWAY, LMA: Performed by: ANESTHESIOLOGY

## 2020-01-24 PROCEDURE — 71000039 HC RECOVERY, EACH ADD'L HOUR: Performed by: ORTHOPAEDIC SURGERY

## 2020-01-24 PROCEDURE — C1769 GUIDE WIRE: HCPCS | Performed by: ORTHOPAEDIC SURGERY

## 2020-01-24 PROCEDURE — 25609 PR OPEN RX DISTAL RADIUS FX, INTRA-ARTICULAR, 3+ FRAG: ICD-10-PCS | Mod: LT,,, | Performed by: ORTHOPAEDIC SURGERY

## 2020-01-24 PROCEDURE — 25609 OPTX DST RD XART FX/EP SEP3+: CPT | Mod: LT,,, | Performed by: ORTHOPAEDIC SURGERY

## 2020-01-24 PROCEDURE — 93010 ELECTROCARDIOGRAM REPORT: CPT | Mod: HCNC,,, | Performed by: INTERNAL MEDICINE

## 2020-01-24 PROCEDURE — 37000008 HC ANESTHESIA 1ST 15 MINUTES: Performed by: ORTHOPAEDIC SURGERY

## 2020-01-24 PROCEDURE — 64415 PR NERVE BLOCK INJ, ANES/STEROID, BRACHIAL PLEXUS, INCL IMAG GUIDANCE: ICD-10-PCS | Mod: 59,LT,, | Performed by: ANESTHESIOLOGY

## 2020-01-24 PROCEDURE — D9220A PRA ANESTHESIA: ICD-10-PCS | Mod: CRNA,,, | Performed by: NURSE ANESTHETIST, CERTIFIED REGISTERED

## 2020-01-24 PROCEDURE — 93010 EKG 12-LEAD: ICD-10-PCS | Mod: HCNC,,, | Performed by: INTERNAL MEDICINE

## 2020-01-24 PROCEDURE — 76942 ECHO GUIDE FOR BIOPSY: CPT | Mod: 26,,, | Performed by: ANESTHESIOLOGY

## 2020-01-24 DEVICE — SCREW BONE NONLOCK HEX 3.5X10: Type: IMPLANTABLE DEVICE | Site: WRIST | Status: FUNCTIONAL

## 2020-01-24 DEVICE — SCREW BNE N LOK HEXLB 3.5X12: Type: IMPLANTABLE DEVICE | Site: WRIST | Status: FUNCTIONAL

## 2020-01-24 DEVICE — SCREW BONE LOK CONG 2.3X22MM: Type: IMPLANTABLE DEVICE | Site: WRIST | Status: FUNCTIONAL

## 2020-01-24 DEVICE — SCREW BONE LOK CONG 2.3X20MM: Type: IMPLANTABLE DEVICE | Site: WRIST | Status: FUNCTIONAL

## 2020-01-24 DEVICE — PLATE ACU-LOC 2 VDF LT NARROW: Type: IMPLANTABLE DEVICE | Site: WRIST | Status: FUNCTIONAL

## 2020-01-24 DEVICE — SCREW BONE 2.3 X 18MM: Type: IMPLANTABLE DEVICE | Site: WRIST | Status: FUNCTIONAL

## 2020-01-24 RX ORDER — HYDROCODONE BITARTRATE AND ACETAMINOPHEN 5; 325 MG/1; MG/1
1 TABLET ORAL EVERY 6 HOURS PRN
Qty: 30 TABLET | Refills: 0 | Status: SHIPPED | OUTPATIENT
Start: 2020-01-24 | End: 2020-01-24 | Stop reason: SDUPTHER

## 2020-01-24 RX ORDER — MIDAZOLAM HYDROCHLORIDE 1 MG/ML
0.5 INJECTION INTRAMUSCULAR; INTRAVENOUS
Status: DISCONTINUED | OUTPATIENT
Start: 2020-01-24 | End: 2020-01-24 | Stop reason: HOSPADM

## 2020-01-24 RX ORDER — ONDANSETRON 2 MG/ML
INJECTION INTRAMUSCULAR; INTRAVENOUS
Status: DISCONTINUED | OUTPATIENT
Start: 2020-01-24 | End: 2020-01-24

## 2020-01-24 RX ORDER — DEXAMETHASONE SODIUM PHOSPHATE 4 MG/ML
INJECTION, SOLUTION INTRA-ARTICULAR; INTRALESIONAL; INTRAMUSCULAR; INTRAVENOUS; SOFT TISSUE
Status: DISCONTINUED | OUTPATIENT
Start: 2020-01-24 | End: 2020-01-24

## 2020-01-24 RX ORDER — MIDAZOLAM HYDROCHLORIDE 1 MG/ML
INJECTION INTRAMUSCULAR; INTRAVENOUS
Status: DISCONTINUED
Start: 2020-01-24 | End: 2020-01-24 | Stop reason: WASHOUT

## 2020-01-24 RX ORDER — MIDAZOLAM HYDROCHLORIDE 1 MG/ML
INJECTION, SOLUTION INTRAMUSCULAR; INTRAVENOUS
Status: DISCONTINUED | OUTPATIENT
Start: 2020-01-24 | End: 2020-01-24

## 2020-01-24 RX ORDER — BUPIVACAINE HYDROCHLORIDE 2.5 MG/ML
INJECTION, SOLUTION EPIDURAL; INFILTRATION; INTRACAUDAL
Status: DISCONTINUED | OUTPATIENT
Start: 2020-01-24 | End: 2020-01-24 | Stop reason: HOSPADM

## 2020-01-24 RX ORDER — FENTANYL CITRATE 50 UG/ML
INJECTION, SOLUTION INTRAMUSCULAR; INTRAVENOUS
Status: DISCONTINUED | OUTPATIENT
Start: 2020-01-24 | End: 2020-01-24

## 2020-01-24 RX ORDER — CEFAZOLIN SODIUM 1 G/3ML
2 INJECTION, POWDER, FOR SOLUTION INTRAMUSCULAR; INTRAVENOUS
Status: COMPLETED | OUTPATIENT
Start: 2020-01-24 | End: 2020-01-24

## 2020-01-24 RX ORDER — PROPOFOL 10 MG/ML
VIAL (ML) INTRAVENOUS
Status: DISCONTINUED | OUTPATIENT
Start: 2020-01-24 | End: 2020-01-24

## 2020-01-24 RX ORDER — MUPIROCIN 20 MG/G
OINTMENT TOPICAL
Status: DISCONTINUED | OUTPATIENT
Start: 2020-01-24 | End: 2020-01-24 | Stop reason: HOSPADM

## 2020-01-24 RX ORDER — BACITRACIN ZINC 500 UNIT/G
OINTMENT (GRAM) TOPICAL
Status: DISCONTINUED | OUTPATIENT
Start: 2020-01-24 | End: 2020-01-24 | Stop reason: HOSPADM

## 2020-01-24 RX ORDER — SODIUM CHLORIDE 9 MG/ML
INJECTION, SOLUTION INTRAVENOUS CONTINUOUS
Status: DISCONTINUED | OUTPATIENT
Start: 2020-01-24 | End: 2020-01-24 | Stop reason: HOSPADM

## 2020-01-24 RX ORDER — ONDANSETRON 2 MG/ML
4 INJECTION INTRAMUSCULAR; INTRAVENOUS EVERY 12 HOURS PRN
Status: CANCELLED | OUTPATIENT
Start: 2020-01-24

## 2020-01-24 RX ORDER — HYDROCODONE BITARTRATE AND ACETAMINOPHEN 5; 325 MG/1; MG/1
1 TABLET ORAL EVERY 4 HOURS PRN
Status: CANCELLED | OUTPATIENT
Start: 2020-01-24

## 2020-01-24 RX ORDER — FENTANYL CITRATE 50 UG/ML
100 INJECTION, SOLUTION INTRAMUSCULAR; INTRAVENOUS ONCE
Status: DISCONTINUED | OUTPATIENT
Start: 2020-01-24 | End: 2020-01-24 | Stop reason: HOSPADM

## 2020-01-24 RX ORDER — HYDROCODONE BITARTRATE AND ACETAMINOPHEN 5; 325 MG/1; MG/1
1 TABLET ORAL EVERY 6 HOURS PRN
Qty: 30 TABLET | Refills: 0 | Status: SHIPPED | OUTPATIENT
Start: 2020-01-24 | End: 2020-03-31

## 2020-01-24 RX ORDER — MUPIROCIN 20 MG/G
1 OINTMENT TOPICAL 2 TIMES DAILY
Status: CANCELLED | OUTPATIENT
Start: 2020-01-24 | End: 2020-01-29

## 2020-01-24 RX ORDER — LIDOCAINE HCL/PF 100 MG/5ML
SYRINGE (ML) INTRAVENOUS
Status: DISCONTINUED | OUTPATIENT
Start: 2020-01-24 | End: 2020-01-24

## 2020-01-24 RX ORDER — HYDROCODONE BITARTRATE AND ACETAMINOPHEN 10; 325 MG/1; MG/1
1 TABLET ORAL EVERY 4 HOURS PRN
Status: CANCELLED | OUTPATIENT
Start: 2020-01-24

## 2020-01-24 RX ORDER — MUPIROCIN 20 MG/G
OINTMENT TOPICAL
Status: COMPLETED
Start: 2020-01-24 | End: 2020-01-24

## 2020-01-24 RX ORDER — LIDOCAINE HYDROCHLORIDE 10 MG/ML
1 INJECTION, SOLUTION EPIDURAL; INFILTRATION; INTRACAUDAL; PERINEURAL ONCE
Status: DISCONTINUED | OUTPATIENT
Start: 2020-01-24 | End: 2020-01-24 | Stop reason: HOSPADM

## 2020-01-24 RX ORDER — SODIUM CHLORIDE 0.9 % (FLUSH) 0.9 %
5 SYRINGE (ML) INJECTION
Status: DISCONTINUED | OUTPATIENT
Start: 2020-01-24 | End: 2020-01-24 | Stop reason: HOSPADM

## 2020-01-24 RX ORDER — FENTANYL CITRATE 50 UG/ML
INJECTION, SOLUTION INTRAMUSCULAR; INTRAVENOUS
Status: DISCONTINUED
Start: 2020-01-24 | End: 2020-01-24 | Stop reason: WASHOUT

## 2020-01-24 RX ORDER — BUPIVACAINE HYDROCHLORIDE AND EPINEPHRINE 2.5; 5 MG/ML; UG/ML
INJECTION, SOLUTION EPIDURAL; INFILTRATION; INTRACAUDAL; PERINEURAL
Status: DISCONTINUED | OUTPATIENT
Start: 2020-01-24 | End: 2020-01-24

## 2020-01-24 RX ADMIN — PROPOFOL 200 MG: 10 INJECTION, EMULSION INTRAVENOUS at 09:01

## 2020-01-24 RX ADMIN — MIDAZOLAM 1 MG: 1 INJECTION INTRAMUSCULAR; INTRAVENOUS at 09:01

## 2020-01-24 RX ADMIN — CEFAZOLIN 2 G: 330 INJECTION, POWDER, FOR SOLUTION INTRAMUSCULAR; INTRAVENOUS at 09:01

## 2020-01-24 RX ADMIN — MUPIROCIN: 20 OINTMENT TOPICAL at 08:01

## 2020-01-24 RX ADMIN — SODIUM CHLORIDE 1000 ML: 0.9 INJECTION, SOLUTION INTRAVENOUS at 08:01

## 2020-01-24 RX ADMIN — BUPIVACAINE HYDROCHLORIDE AND EPINEPHRINE BITARTRATE 30 ML: 2.5; .0091 INJECTION, SOLUTION EPIDURAL; INFILTRATION; INTRACAUDAL; PERINEURAL at 11:01

## 2020-01-24 RX ADMIN — LIDOCAINE HYDROCHLORIDE 50 MG: 20 INJECTION, SOLUTION INTRAVENOUS at 09:01

## 2020-01-24 RX ADMIN — SODIUM CHLORIDE, SODIUM GLUCONATE, SODIUM ACETATE, POTASSIUM CHLORIDE, MAGNESIUM CHLORIDE, SODIUM PHOSPHATE, DIBASIC, AND POTASSIUM PHOSPHATE: .53; .5; .37; .037; .03; .012; .00082 INJECTION, SOLUTION INTRAVENOUS at 10:01

## 2020-01-24 RX ADMIN — PROPOFOL 50 MG: 10 INJECTION, EMULSION INTRAVENOUS at 09:01

## 2020-01-24 RX ADMIN — ONDANSETRON 4 MG: 2 INJECTION INTRAMUSCULAR; INTRAVENOUS at 09:01

## 2020-01-24 RX ADMIN — DEXAMETHASONE SODIUM PHOSPHATE 8 MG: 4 INJECTION, SOLUTION INTRAMUSCULAR; INTRAVENOUS at 09:01

## 2020-01-24 RX ADMIN — FENTANYL CITRATE 50 MCG: 50 INJECTION, SOLUTION INTRAMUSCULAR; INTRAVENOUS at 09:01

## 2020-01-24 NOTE — BRIEF OP NOTE
[facility]  Brief Operative Note    Surgery Date: 1/24/2020     Surgeon(s) and Role:     * Ellen Moe MD - Primary    Assisting Surgeon: Kami Du MD    Pre-op Diagnosis:  Other closed extra-articular fracture of distal end of left radius, initial encounter [S52.552A]    Post-op Diagnosis:  Post-Op Diagnosis Codes:     * Other closed extra-articular fracture of distal end of left radius, initial encounter [S52.552A]    Procedure(s) (LRB):  ORIF, FRACTURE, RADIUS, DISTAL-left (Left)    Anesthesia: General    Description of the findings of the procedure(s): see op note    Estimated Blood Loss: minimal         Specimens:   Specimen (12h ago, onward)    None            Discharge Note    OUTCOME: Patient tolerated treatment/procedure well without complication and is now ready for discharge.    DISPOSITION: Home or Self Care    FINAL DIAGNOSIS:  Closed fracture of left distal radius    FOLLOWUP: In clinic

## 2020-01-24 NOTE — PROGRESS NOTES
Spoke with Stanislaw Lyons-RN Operations Coord- Arrhythmia Clinic and she states if needed, a magnet can be taped over her chest and her pacemaker will reprogram itself once magnet is removed.

## 2020-01-24 NOTE — ANESTHESIA PREPROCEDURE EVALUATION
01/24/2020  Manisha Wynne is a 69 y.o., female   Pre-operative evaluation for Procedure(s) (LRB):  ORIF, FRACTURE, RADIUS, DISTAL-left (Left)    Manisha Wynne is a 69 y.o. female       Active problems:  Patient Active Problem List    Diagnosis Date Noted    Closed fracture of left distal radius 01/23/2020    Nasal deformity 12/19/2019    Sensory peripheral neuropathy 08/21/2019    Atrophic pancreas 07/05/2019    Primary hyperparathyroidism 07/03/2019    Osteopenia 07/03/2019    CKD (chronic kidney disease) stage 3, GFR 30-59 ml/min 01/17/2019    RBBB 07/29/2018    CEDRIC (obstructive sleep apnea)     Sacroiliac joint dysfunction 05/27/2015    Lumbar spinal stenosis 03/31/2015    Lumbar radiculopathy 03/31/2015    Facet arthritis of lumbar region 03/31/2015    Abnormal Pap smear of cervix 10/01/2013    Cardiac pacemaker in situ 11/09/2012    Hyperlipidemia     AV block, 1st degree 07/25/2012    Depression 07/24/2012    Hypertension 07/24/2012    SSS (sick sinus syndrome) 07/24/2012       Prev airway:       Review of patient's allergies indicates:  No Known Allergies     No current facility-administered medications on file prior to encounter.      Current Outpatient Medications on File Prior to Encounter   Medication Sig Dispense Refill    aspirin (ECOTRIN) 81 MG EC tablet Take 81 mg by mouth. 1 Tablet, Delayed Release (E.C.) Oral Every day      buPROPion (WELLBUTRIN XL) 300 MG 24 hr tablet Take 300 mg by mouth once daily.       calcium-vitamin D 600 mg(1,500mg) -200 unit Tab Take 1 tablet by mouth once daily.       cholecalciferol, vitamin D3, (VITAMIN D3) 5,000 unit Tab Take 5,000 Units by mouth once daily.      ferrous sulfate (FEOSOL) 325 mg (65 mg iron) Tab tablet Take 325 mg by mouth daily with breakfast. Taking two times daily      flu vacc [id number] up,PF  (FLUZONE HIGH-DOSE 2019-20, PF,) 180 mcg/0.5 mL Syrg admin as directed 0.5 mL 0    FLUoxetine (PROZAC) 20 MG capsule Take 40 mg by mouth once daily.       HYDROcodone-acetaminophen (NORCO) 5-325 mg per tablet Take 1 tablet by mouth every 6 (six) hours as needed for Pain. 15 tablet 0    ibuprofen (ADVIL,MOTRIN) 600 MG tablet Take 1 tablet (600 mg total) by mouth every 6 (six) hours as needed for Pain. 30 tablet 0    lisdexamfetamine (VYVANSE) 70 MG capsule Take 70 mg by mouth every morning.      losartan (COZAAR) 25 MG tablet Take 1 tablet (25 mg total) by mouth once daily. 30 tablet 3    metoprolol tartrate (LOPRESSOR) 25 MG tablet TAKE 1 TABLET (25 MG TOTAL) BY MOUTH 2 (TWO) TIMES DAILY. 180 tablet 3    multivitamin-minerals-lutein (CENTRUM SILVER) Tab Take by mouth. 1 Tablet Oral Every day      ondansetron (ZOFRAN-ODT) 8 MG TbDL Take 1 tablet (8 mg total) by mouth every 6 (six) hours as needed (Nausea). 15 tablet 0    pravastatin (PRAVACHOL) 10 MG tablet TAKE 1 TABLET (10 MG TOTAL) BY MOUTH ONCE DAILY. 90 tablet 3       Past Surgical History:   Procedure Laterality Date    APPLICATION OF CARTILAGE GRAFT Bilateral 12/19/2019    Procedure: APPLICATION, CARTILAGE GRAFT AURICULAR JEZ;  Surgeon: Martinez Flores III, MD;  Location: [facility];  Service: ENT;  Laterality: Bilateral;    CARDIAC PACEMAKER PLACEMENT  09/07/2012    [id number]    DILATION AND CURETTAGE OF UTERUS      Hx of precancerous cells?    ENDOSCOPIC ULTRASOUND OF UPPER GASTROINTESTINAL TRACT N/A 7/5/2019    Procedure: ULTRASOUND, UPPER GI TRACT, ENDOSCOPIC;  Surgeon: Kev Calderon MD;  [facility]  Service: Endoscopy;  Laterality: N/A;  Pacemaker-Adam   appt confirmed-rb    NASAL SEPTOPLASTY N/A 12/19/2019    Procedure: SEPTOPLASTY, NOSE;  Surgeon: Martinez Flores III, MD;  Location: [facility];  Service: ENT;  Laterality: N/A;  FOLLOW DR SALVATORE KIMBROUGH PROTOCOL    SURGICAL REMOVAL OF NASAL TURBINATE  Bilateral 2019    Procedure: EXCISION, NASAL TURBINATE;  Surgeon: Martinez Flores III, MD;  Location[facility];  Service: ENT;  Laterality: Bilateral;    TONSILLECTOMY      WISDOM TOOTH EXTRACTION         Social History     Socioeconomic History    Marital status: Single     Spouse name: Not on file    Number of children: Not on file    Years of education: Not on file    Highest education level: Not on file   Occupational History    Occupation: /Rabbi     Employer: [employer]   Social Needs    Financial resource strain: Somewhat hard    Food insecurity:     Worry: Never true     Inability: Never true    Transportation needs:     Medical: No     Non-medical: Yes   Tobacco Use    Smoking status: Former Smoker     Packs/day: 0.25     Years: 15.00     Pack years: 3.75     Last attempt to quit: 1982     Years since quittin.5    Smokeless tobacco: Never Used   Substance and Sexual Activity    Alcohol use: Yes     Frequency: 2-4 times a month     Drinks per session: 1 or 2     Binge frequency: Never     Comment: occ    Drug use: No    Sexual activity: Not Currently   Lifestyle    Physical activity:     Days per week: 1 day     Minutes per session: 20 min    Stress: Only a little   Relationships    Social connections:     Talks on phone: More than three times a week     Gets together: Once a week     Attends [employer] service: Not on file     Active member of club or organization: Yes     Attends meetings of clubs or organizations: 1 to 4 times per year     Relationship status:    Other Topics Concern    Patient feels they ought to cut down on drinking/drug use Not Asked    Patient annoyed by others criticizing their drinking/drug use Not Asked    Patient has felt bad or guilty about drinking/drug use Not Asked    Patient has had a drink/used drugs as an eye opener in the AM Not Asked   Social History Narrative    Not on file         Vital Signs Range  (Last 24H):  Wt Readings from Last 3 Encounters:   01/23/20 71.7 kg (158 lb 1.1 oz)   01/23/20 71.7 kg (158 lb)   12/17/19 71.7 kg (158 lb)     Temp Readings from Last 3 Encounters:   01/23/20 37.1 °C (98.8 °F)   12/19/19 36.4 °C (97.6 °F) (Axillary)   12/13/19 37.1 °C (98.8 °F) (Oral)     BP Readings from Last 3 Encounters:   01/23/20 (!) 156/77   01/23/20 (!) 162/65   12/19/19 110/66     Pulse Readings from Last 3 Encounters:   01/23/20 75   01/23/20 61   12/19/19 60         CBC:   Lab Results   Component Value Date    WBC 6.59 12/13/2019    HGB 11.8 (L) 12/13/2019    HCT 36.6 (L) 12/13/2019    MCV 92 12/13/2019     12/13/2019       CMP: CMP  Sodium   Date Value Ref Range Status   12/13/2019 141 136 - 145 mmol/L Final     Potassium   Date Value Ref Range Status   12/13/2019 4.3 3.5 - 5.1 mmol/L Final     Chloride   Date Value Ref Range Status   12/13/2019 107 95 - 110 mmol/L Final     CO2   Date Value Ref Range Status   12/13/2019 25 23 - 29 mmol/L Final     Glucose   Date Value Ref Range Status   12/13/2019 100 70 - 110 mg/dL Final     BUN, Bld   Date Value Ref Range Status   12/13/2019 22 8 - 23 mg/dL Final     Creatinine   Date Value Ref Range Status   12/13/2019 1.0 0.5 - 1.4 mg/dL Final     Calcium   Date Value Ref Range Status   12/13/2019 10.3 8.7 - 10.5 mg/dL Final     Total Protein   Date Value Ref Range Status   11/29/2019 7.4 6.0 - 8.4 g/dL Final     Albumin   Date Value Ref Range Status   11/29/2019 4.1 3.5 - 5.2 g/dL Final     Total Bilirubin   Date Value Ref Range Status   11/29/2019 0.8 0.1 - 1.0 mg/dL Final     Comment:     For infants and newborns, interpretation of results should be based  on gestational age, weight and in agreement with clinical  observations.  Premature Infant recommended reference ranges:  Up to 24 hours.............<8.0 mg/dL  Up to 48 hours............<12.0 mg/dL  3-5 days..................<15.0 mg/dL  6-29 days.................<15.0 mg/dL       Alkaline Phosphatase    Date Value Ref Range Status   11/29/2019 97 55 - 135 U/L Final     AST   Date Value Ref Range Status   11/29/2019 51 (H) 10 - 40 U/L Final     ALT   Date Value Ref Range Status   11/29/2019 69 (H) 10 - 44 U/L Final     Anion Gap   Date Value Ref Range Status   12/13/2019 9 8 - 16 mmol/L Final     eGFR if    Date Value Ref Range Status   12/13/2019 >60 >60 mL/min/1.73 m^2 Final     eGFR if non    Date Value Ref Range Status   12/13/2019 57 (A) >60 mL/min/1.73 m^2 Final     Comment:     Calculation used to obtain the estimated glomerular filtration  rate (eGFR) is the CKD-EPI equation.          INR  Lab Results   Component Value Date    INR 1.0 11/29/2019    INR 1.1 09/06/2012    INR 1.1 08/24/2012           Diagnostic Studies:      EKG:    Atrial-paced rhythm with prolonged AV conduction  Right bundle branch block  Nonspecific T wave abnormality  Abnormal ECG  When compared with ECG of 03-OCT-2019 10:03,  Nonspecific T wave abnormality now evident in Anterior leads  Confirmed by NNAMDI GARDNER MD ([id number]) on 11/26/2019 9:22:07 AM    2D Echo:  1/25/19    · Mild-to-moderate aortic regurgitation.  · Mild mitral regurgitation.  · Mild tricuspid regurgitation.  · Normal central venous pressure (3 mm Hg).  · The estimated PA systolic pressure is 27 mm Hg  · Normal left ventricular systolic function. The estimated ejection fraction is 60%  · Indeterminate left ventricular diastolic function.  · Normal right ventricular systolic function.    .    Anesthesia Evaluation         Review of Systems  Anesthesia Hx:  No problems with previous Anesthesia History of prior surgery of interest to airway management or planning: Denies Family Hx of Anesthesia complications.   Denies Personal Hx of Anesthesia complications.   Cardiovascular:   Hypertension, well controlled Dysrhythmias Pacemaker SSS  Spoke with cardiology, pt pacemaker dependent; will place magnet during case and no need to reprogram after     Pulmonary:   Sleep Apnea    Renal/:   Chronic Renal Disease, CRI    Endocrine:  Endocrine Normal        Physical Exam  General:  Well nourished    Airway/Jaw/Neck:  Airway Findings: Mouth Opening: Normal Tongue: Normal  General Airway Assessment: Adult  Mallampati: I  Improves to I with phonation.  TM Distance: Normal, at least 6 cm  Jaw/Neck Findings:  Neck ROM: Normal ROM      Dental:  Dental Findings: In tact   Chest/Lungs:  Chest/Lungs Findings: Clear to auscultation     Heart/Vascular:  Heart Findings: Rate: Normal  Rhythm: Regular Rhythm  Sounds: Normal        Mental Status:  Mental Status Findings:  Cooperative         Anesthesia Plan  Type of Anesthesia, risks & benefits discussed:  Anesthesia Type:  general, regional, MAC  Patient's Preference:   Intra-op Monitoring Plan:   Intra-op Monitoring Plan Comments:   Post Op Pain Control Plan: multimodal analgesia and peripheral nerve block  Post Op Pain Control Plan Comments:   Induction:   IV  Beta Blocker:  Patient is on a Beta-Blocker and has received one dose within the past 24 hours (No further documentation required).       Informed Consent: Patient understands risks and agrees with Anesthesia plan.  Questions answered. Anesthesia consent signed with patient.  ASA Score: 3     Day of Surgery Review of History & Physical:    H&P update referred to the surgeon.         Ready For Surgery From Anesthesia Perspective.

## 2020-01-24 NOTE — TELEPHONE ENCOUNTER
Patient has been identified as having an implanted cardiac rhythm device (CRD); the implanted device is a BENJY pacemaker.      No noted pacer dependency.        PACEMAKERS/NON-DEPENDENT    Per protocol, no pacemaker reprogramming is required in this non-dependent patient.      For additional questions, please contact the Arrhythmia Department at Ext [phone].

## 2020-01-24 NOTE — ANESTHESIA PROCEDURE NOTES
Peripheral Block    Patient location during procedure: pre-op   Block not for primary anesthetic.  Reason for block: at surgeon's request and post-op pain management   Post-op Pain Location: left arm pain   Start time: 1/24/2020 10:48 AM  Timeout: 1/24/2020 10:44 AM   End time: 1/24/2020 11:00 AM    Staffing  Authorizing Provider: Ellen Mitchell MD  Performing Provider: Archie Zarate MD    Preanesthetic Checklist  Completed: patient identified, site marked, surgical consent, pre-op evaluation, timeout performed, IV checked, risks and benefits discussed and monitors and equipment checked  Peripheral Block  Patient position: supine  Prep: ChloraPrep  Patient monitoring: heart rate, cardiac monitor, continuous pulse ox, continuous capnometry and frequent blood pressure checks  Block type: supraclavicular  Laterality: left  Injection technique: single shot  Needle  Needle type: Stimuplex   Needle gauge: 22 G  Needle length: 2 in  Needle localization: anatomical landmarks and ultrasound guidance   -ultrasound image captured on disc.  Assessment  Injection assessment: negative aspiration, negative parasthesia and local visualized surrounding nerve  Paresthesia pain: none  Heart rate change: no  Slow fractionated injection: yes  Additional Notes  VSS.  [facility] RN monitoring vitals throughout procedure.  Patient tolerated procedure well.     Additives added to 30ml of 0.25% bupivacaine

## 2020-01-24 NOTE — ADDENDUM NOTE
Addendum  created 01/24/20 1425 by Archie Zarate MD    Child order released for a procedure order

## 2020-01-24 NOTE — INTERVAL H&P NOTE
The patient has been examined and the H&P has been reviewed:    I concur with the findings and no changes have occurred since H&P was written.    Anesthesia/Surgery risks, benefits and alternative options discussed and understood by patient/family.          Active Hospital Problems    Diagnosis  POA    Closed fracture of left distal radius [S54.033O]  Yes      Resolved Hospital Problems   No resolved problems to display.

## 2020-01-24 NOTE — INTERVAL H&P NOTE
The patient has been examined and the H&P has been reviewed:    I concur with the findings and no changes have occurred since H&P was written.    Anesthesia/Surgery risks, benefits and alternative options discussed and understood by patient/family.          Active Hospital Problems    Diagnosis  POA    Closed fracture of left distal radius [S57.912J]  Yes      Resolved Hospital Problems   No resolved problems to display.

## 2020-01-24 NOTE — OP NOTE
[facility]  Surgery Department  Operative Note    SUMMARY     Date of Procedure: 1/24/2020     Procedure: Procedure(s) (LRB):  ORIF, FRACTURE, RADIUS, DISTAL-left (Left)     Surgeon(s) and Role:     * Ellen Moe MD - Primary    Assisting Surgeon: None    Pre-Operative Diagnosis: Other closed extra-articular fracture of distal end of left radius, initial encounter [S52.552A]    Post-Operative Diagnosis: Post-Op Diagnosis Codes:     * Other closed extra-articular fracture of distal end of left radius, initial encounter [S52.552A]    Anesthesia: General    Technical Procedures Used: surgery    Description of the Findings of the Procedure:   Subjective:                  DASERVICE:  Orthopedics.     ATTENDING SURGEON:  Ellen Moe M.D.     RESIDENT SURGEON:  florian     PREOPERATIVE DIAGNOSIS: left 3 part intraarticular distal radius fracture.     POSTOPERATIVE DIAGNOSIS: left 3 part intraarticular distal radius fracture.     PROCEDURES:  1.  Open reduction and internal fixation of 3 part intraarticular left  distal radius fracture.  2.  Fluoro use.  3.  Splint application.     ANESTHESIA:  Regional MAC.     FLUIDS:  Lactated Ringer's.     BLOOD LOSS:  None.  No blood was given.     TOURNIQUET TIME:  Less than 1 hour.     PACKS AND DRAINS:  None.     IMPLANTS:  Acumed volar plate, narrow, with screws.     COMPLICATIONS:  None.     INDICATIONS FOR PROCEDURE:  Ms. Wynne is a 69 that fell and   sustained an intraarticular distal radius fracture.  An attempted   closed reduction was performed in the Emergency Room.  After much discussion   with the patient, we elected for surgical intervention.  Risks and benefits were   explained to the patient in clinic.  Consents were performed in clinic.     PROCEDURE IN DETAIL:  After the correct site was marked with the patient's   participation in the holding area, the patient underwent regional anesthesia.    They were brought to the  Operating Room and placed in the supine position.  A   well-padded nonsterile tourniquet was placed on the  upper extremity.    The upper extremity was prepped and draped in a normal sterile fashion.  A   timeout was conducted for the correct site and procedure to be indicated.  IV   antibiotics were given to the patient preoperatively.  An incision was marked on   the volar surface of the right wrist.  The arm was exsanguinated.  An Esmarch   tourniquet was insufflated to 250 mmHg.  The incision was made with careful   dissection down to the FCR tendon.  The FCR was gently retracted ulnarly.  The   fascia was incised.  Metzenbaum scissors were used to cut the remainder of the   fascia.  The pronator quadratus was elevated up.  Of note, it showed that there   was a significant amount of comminution.  Reduction maneuvers were performed at   this point under C-arm fluoroscopy.  The Acumed volar plate was then placed into   position.  K-wire was introduced distally to hold the volar plate.  A shaft   screw was then drilled and filled appropriately to hold the plate in position.    Again, all of this was performed under C-arm fluoroscopy.  A compression screw   was then drilled distally in the most ulnar aspect of the plate.  The remaining   screws were drilled and filled under locking technique.  The two radial styloid   screws were drilled and filled under C-arm fluoroscopy.  The compression screw   in the ulnar aspect was removed and a locking screw was placed.  The two   remaining shaft screws were drilled and filled appropriately.  AP, lateral, and   lateral oblique views were taken to confirm the articular surface and that the   hardware did not penetrate the articular surface and this was again taken   through a 20-degree view.  The DRUJ was assessed and showed that it was stable.    Full flexion and extension views were also taken to confirm stability of the   fracture and that the hardware did not violate the  articular surface.  The area   was then irrigated with copious amounts of normal saline.  Vicryl, Monocryl, and   Dermabond closed the skin.  Sterile dressing was applied.  Tourniquet was   deflated.  Brisk capillary refill ensued.  The patient was placed in a   well-padded splint, tolerated the procedure well, and was brought to Recovery   Area in stable condition.     POSTOPERATIVE PLAN FOR THIS PATIENT:  Keep the dressing clean, dry, and intact.    We will see her back at two weeks' time.  Therapy is to be initiated at two   weeks' time.  They did have significant swelling preoperatively, and we did   advise the patient on elevating the hand as well as vitamin C.             Significant Surgical Tasks Conducted by the Assistant(s), if Applicable: none    Complications: No    Estimated Blood Loss (EBL): * No values recorded between 1/24/2020  9:32 AM and 1/24/2020 10:22 AM *           Implants:   Implant Name Type Inv. Item Serial No.  Lot No. LRB No. Used   PLATE ACU-LOC 2 VDF LT NARROW - [id number]  PLATE ACU-LOC 2 VDF LT NARROW  [employer]  Left 1   GUIDEWIRE ORTHO .054 X 6 IN - [id number]  GUIDEWIRE ORTHO .054 X 6 IN  [employer]  Left 1   SCREW BONE 2.3 X 24MM - [id number]  SCREW BONE 2.3 X 24MM  [employer]  Left 1   SCREW BONE SALINAS WALTER 2.3X22MM - [id number]  SCREW BONE SALINAS WALTER 2.3X22MM  [employer]  Left 2   SCREW BONE SALINAS WALTER 2.3X20MM - [id number]  SCREW BONE SALINAS WALTER 2.3X20MM  [employer]  Left 2   SCREW BONE 2.3 X 18MM - [id number]  SCREW BONE 2.3 X 18MM  [employer]  Left 2   SCREW BONE NONLOCK HEX 3.5X10 - [id number]  SCREW BONE NONLOCK HEX 3.5X10  [employer]  Left 1   SCREW BONE NONLOCK HEX 3.5X10 - [id number]  SCREW BONE NONLOCK HEX 3.5X10  [employer]  Left 1   SCREW BNE N SALINAS HEXLB 3.5X12 - [id number]  SCREW BNE N SALINAS HEXLB 3.5X12  [employer]  Left 2       Specimens:   Specimen (12h ago, onward)    None                  Condition: Good    Disposition: PACU - hemodynamically  stable.    Attestation: I performed the procedure.    Discharge Note    SUMMARY     Admit Date: 1/24/2020    Discharge Date and Time:  01/24/2020 11:32 AM    Hospital Course (synopsis of major diagnoses, care, treatment, and services provided during the course of the hospital stay): surgery     Final Diagnosis: Post-Op Diagnosis Codes:     * Other closed extra-articular fracture of distal end of left radius, initial encounter [S52.552A]    Disposition: Home or Self Care    Follow Up/Patient Instructions:     Medications:  Reconciled Home Medications:      Medication List      CONTINUE taking these medications    aspirin 81 MG EC tablet  Commonly known as:  ECOTRIN  Take 81 mg by mouth. 1 Tablet, Delayed Release (E.C.) Oral Every day     buPROPion 300 MG 24 hr tablet  Commonly known as:  WELLBUTRIN XL  Take 300 mg by mouth once daily.     calcium-vitamin D3 600 mg(1,500mg) -200 unit Tab  Commonly known as:  CALCIUM 600 + D(3)  Take 1 tablet by mouth once daily.     Centrum Silver Tab  Generic drug:  multivitamin-minerals-lutein  Take by mouth. 1 Tablet Oral Every day     ferrous sulfate 325 mg (65 mg iron) Tab tablet  Commonly known as:  FEOSOL  Take 325 mg by mouth daily with breakfast. Taking two times daily     FLUoxetine 20 MG capsule  Take 40 mg by mouth once daily.     Fluzone High-Dose 2019-20 (PF) 180 mcg/0.5 mL Syrg  Generic drug:  flu vacc [id number](65yr up)PF  admin as directed     HYDROcodone-acetaminophen 5-325 mg per tablet  Commonly known as:  NORCO  Take 1 tablet by mouth every 6 (six) hours as needed for Pain.     ibuprofen 600 MG tablet  Commonly known as:  ADVIL,MOTRIN  Take 1 tablet (600 mg total) by mouth every 6 (six) hours as needed for Pain.     lisdexamfetamine 70 MG capsule  Commonly known as:  VYVANSE  Take 70 mg by mouth every morning.     losartan 25 MG tablet  Commonly known as:  COZAAR  Take 1 tablet (25 mg total) by mouth once daily.     metoprolol tartrate 25 MG tablet  Commonly known  as:  LOPRESSOR  TAKE 1 TABLET (25 MG TOTAL) BY MOUTH 2 (TWO) TIMES DAILY.     ondansetron 8 MG Tbdl  Commonly known as:  ZOFRAN-ODT  Take 1 tablet (8 mg total) by mouth every 6 (six) hours as needed (Nausea).     pravastatin 10 MG tablet  Commonly known as:  PRAVACHOL  TAKE 1 TABLET (10 MG TOTAL) BY MOUTH ONCE DAILY.     Vitamin D3 125 mcg (5,000 unit) Tab  Generic drug:  cholecalciferol (vitamin D3)  Take 5,000 Units by mouth once daily.          Discharge Procedure Orders   Diet general     Sponge bath only until clinic visit     Call MD for:  temperature >100.4     Call MD for:  persistent nausea and vomiting     Call MD for:  severe uncontrolled pain     Call MD for:  difficulty breathing, headache or visual disturbances     Call MD for:  redness, tenderness, or signs of infection (pain, swelling, redness, odor or green/yellow discharge around incision site)     Call MD for:  hives     Call MD for:  persistent dizziness or light-headedness     Call MD for:  extreme fatigue     Leave dressing on - Keep it clean, dry, and intact until clinic visit

## 2020-01-24 NOTE — DISCHARGE INSTRUCTIONS
Recovery After Procedural Sedation (Adult)  You have been given medicine by vein to make you sleep during your surgery. This may have included both a pain medicine and sleeping medicine. Most of the effects have worn off. But you may still have some drowsiness for the next 6 to 8 hours.    Home care  Follow these guidelines when you get home:  · For the next 8 hours, you should be watched by a responsible adult. This person should make sure your condition is not getting worse.  · Don't drink any alcohol for the next 24 hours.  · Don't drive, operate dangerous machinery, or make important business or personal decisions during the next 24 hours.  Note: Your healthcare provider may tell you not to take any medicine by mouth for pain or sleep in the next 4 hours. These medicines may react with the medicines you were given in the hospital. This could cause a much stronger response than usual.    Follow-up care  Follow up with your healthcare provider if you are not alert and back to your usual level of activity within 12 hours.    When to seek medical advice  Call your healthcare provider right away if any of these occur:  · Drowsiness gets worse  · Weakness or dizziness gets worse  · Repeated vomiting  · You can't be awakened     Date Last Reviewed: 10/18/2016  © 3620-8386 [employer]. [address] [place] [address], [place] [address]. All rights reserved. This information is not intended as a substitute for professional medical care. Always follow your healthcare professional's instructions.    Discharge Instructions: After Your Surgery  You've just had surgery. During surgery, you were given medicine called anesthesia to keep you relaxed and free of pain. After surgery, you may have some pain or nausea. This is common. Here are some tips for feeling better and getting well after surgery.    Stay on schedule with your medicine.   Going home  Your healthcare provider will show you how to take care of yourself when  you go home. He or she will also answer your questions. Have an adult family member or friend drive you home. For the first 24 hours after your surgery:  · Have someone stay with you, if needed. He or she can watch for problems and help keep you safe.  Be sure to go to all follow-up visits with your healthcare provider. And rest after your surgery for as long as your healthcare provider tells you to.    Coping with pain  If you have pain after surgery, pain medicine will help you feel better. Take it as told, before pain becomes severe. Also, ask your healthcare provider or pharmacist about other ways to control pain. This might be with heat, ice, or relaxation. And follow any other instructions your surgeon or nurse gives you.    Tips for taking pain medicine  To get the best relief possible, remember these points:  · Pain medicines can upset your stomach. Taking them with a little food may help.  · Most pain relievers taken by mouth need at least 20 to 30 minutes to start to work.  · Taking medicine on a schedule can help you remember to take it. Try to time your medicine so that you can take it before starting an activity. This might be before you get dressed, go for a walk, or sit down for dinner.  · Constipation is a common side effect of pain medicines. Call your healthcare provider before taking any medicines such as laxatives or stool softeners to help ease constipation. Also ask if you should skip any foods. Drinking lots of fluids and eating foods such as fruits and vegetables that are high in fiber can also help. Remember, do not take laxatives unless your surgeon has prescribed them.    Your healthcare provider may tell you to take acetaminophen to help ease your pain. Ask him or her how much you are supposed to take each day. Acetaminophen or other pain relievers may interact with your prescription medicines or other over-the-counter (OTC) medicines. Some prescription medicines have acetaminophen and  other ingredients. Using both prescription and OTC acetaminophen for pain can cause you to overdose. Read the labels on your OTC medicines with care. This will help you to clearly know the list of ingredients, how much to take, and any warnings. It may also help you not take too much acetaminophen. If you have questions or do not understand the information, ask your pharmacist or healthcare provider to explain it to you before you take the OTC medicine.    Managing nausea  Some people have an upset stomach after surgery. This is often because of anesthesia, pain, or pain medicine, or the stress of surgery. These tips will help you handle nausea and eat healthy foods as you get better. If you were on a special food plan before surgery, ask your healthcare provider if you should follow it while you get better. These tips may help:  · Do not push yourself to eat. Your body will tell you when to eat and how much.  · Start off with clear liquids and soup. They are easier to digest.  · Next try semi-solid foods, such as mashed potatoes, applesauce, and gelatin, as you feel ready.  · Slowly move to solid foods. Don't eat fatty, rich, or spicy foods at first.  · Do not force yourself to have 3 large meals a day. Instead eat smaller amounts more often.  · Take pain medicines with a small amount of solid food, such as crackers or toast, to avoid nausea.     Call your surgeon if  · You still have pain an hour after taking medicine. The medicine may not be strong enough.  · You feel too sleepy, dizzy, or groggy. The medicine may be too strong.  · You have side effects like nausea, vomiting, or skin changes, such as rash, itching, or hives.       If you have obstructive sleep apnea  You were given anesthesia medicine during surgery to keep you comfortable and free of pain. After surgery, you may have more apnea spells because of this medicine and other medicines you were given. The spells may last longer than usual.   At  home:  · Keep using the continuous positive airway pressure (CPAP) device when you sleep. Unless your healthcare provider tells you not to, use it when you sleep, day or night. CPAP is a common device used to treat obstructive sleep apnea.  · Talk with your provider before taking any pain medicine, muscle relaxants, or sedatives. Your provider will tell you about the possible dangers of taking these medicines.  Date Last Reviewed: 12/1/2016  © 5201-8964 [employer]. [address] [place] [address]. All rights reserved. This information is not intended as a substitute for professional medical care. Always follow your healthcare professional's instructions.          PATIENT INSTRUCTIONS  POST-ANESTHESIA    IMMEDIATELY FOLLOWING SURGERY:  Do not drive or operate machinery for the first twenty four hours after surgery.  Do not make any important decisions for twenty four hours after surgery or while taking narcotic pain medications or sedatives.  If you develop intractable nausea and vomiting or a severe headache please notify your doctor immediately.    FOLLOW-UP:  Please make an appointment with your surgeon as instructed. You do not need to follow up with anesthesia unless specifically instructed to do so.    WOUND CARE INSTRUCTIONS (if applicable):  Keep a dry clean dressing on the anesthesia/puncture wound site if there is drainage.  Once the wound has quit draining you may leave it open to air.  Generally you should leave the bandage intact for twenty four hours unless there is drainage.  If the epidural site drains for more than 36-48 hours please call the anesthesia department.    QUESTIONS?:  Please feel free to call your physician or the hospital  if you have any questions, and they will be happy to assist you.       [facility] Anesthesia Department  [address] [facility]  [phone]      Wound Check After Surgery: Bleeding  Surgery involves cutting through  layers of skin, fatty tissue, muscle, and sometimes bone and cartilage. Sutures (stitches) or staples are used to close all layers of the wound. The sutures on the inside will dissolve in about 2 to 3 weeks. Any sutures or staples used on the outside need to be removed in about 7 to 14 days, depending on the location.  It is normal to have some clear or bloody discharge on the wound covering or bandage (dressing) for the first few days after surgery. If your wound was sutured (sewn) closed, you should not have to change the dressing more than three times a day in the first few days. Bleeding or discharge requiring more frequent dressing changes can be a sign of a problem.    Home care  Different types of surgery require different types of care and dressing changes. It is important to follow all instructions and advice from your surgeon, as well as other members of your healthcare team.    Wound care  · Keep the wound clean, as directed by your healthcare provider.  · Change the dressing as directed. Change the dressing sooner if it becomes wet or stained with blood or fluid from the wound.  · Bathe with a sponge (no shower or tub baths) for the first few days after surgery, or until there is no more drainage from the wound. Unless you received different instructions from your surgeon, you can then shower. Do not soak the area in water (no baths or swimming) until the tape, sutures, or staples are removed and any wound opening has dried out and healed.    Changing the dressing  · Wash your hands before changing the dressings.  · Carefully remove the dressing and tape; don't just yank it off. If it sticks to the wound, you may need to wet it a little to remove it, unless your healthcare provider told you not to wet it.  · Wash your hands again before putting on a new, clean dressing.  · Gently clean the wound with clean water (or saline) using gauze or a clean washcloth. Do not rub it or pick at it.  · Do not use  soap, alcohol, hydrogen peroxide, or any other cleanser.  · If you were told to dry the wound before putting on a new dressing, gently pat it dry. Do not rub.  · Throw out the old dressing. Do not reuse it!  · Wash your hands again when you are done.    Types of dressings  Your healthcare team will tell you what type of dressing to put on your wound. Follow your healthcare team's instructions carefully, and contact them if you have any questions. Two common types of dressings are described below. You may have one of these or another type.  · Dry dressing. Use dry gauze. If the wound is still draining, use a nonadherent dressing, which shouldn't stick to the wound.  · Wet-to-dry dressing. Wet the gauze, and squeeze out the excess water (or saline), before putting it on. Then, cover this with a dry pad.    Medicines  · If you were given antibiotics, take them until they are used up or your healthcare provider tells you to stop. It is important to finish the antibiotics even though you feel better, to make sure the infection has cleared.  · You can take acetaminophen or ibuprofen for pain, unless you were given a different pain medicine to use. (Note: If you have chronic liver or kidney disease, or have ever had a stomach ulcer or gastrointestinal bleeding, or are taking blood thinner medicines, talk with your healthcare provider before using these medicines.)  · Aspirin should never be used in anyone under 18 years of age who is ill with a fever. It may cause severe liver damage.    Follow-up care  Follow up with your healthcare provider, or as advised, for your next wound check or removal of sutures, staples, or tape.  · If a culture was done, you will be notified if the results will affect your treatment. You can call as directed for the results.  · If imaging tests, such as X-rays, an ultrasound, or CT scan were done, they will be reviewed by a specialist. You will be notified of the results, especially if they  affect treatment.    Call 911  Call emergency services right away if any of these occur:  · Trouble breathing or swallowing, wheezing  · Hoarse voice or trouble speaking  · Extreme confusion  · Extreme drowsiness or trouble awakening  · Fainting or loss of consciousness  · Rapid heart rate or very slow heart rate  · Vomiting blood, or large amounts of blood in stool  · Discomfort in the center of the chest that feels like pressure, squeezing, a sense of fullness, or pain  · Discomfort or pain in other upper body areas, such as the back, one or both arms, neck, jaw, or stomach  · Stroke symptoms (spot a stroke FAST)  ¨ F: Face drooping. One side of the face is numb or droops.  ¨ A: Arm weakness. One arm feels weak or numb.  ¨ S: Speech difficulty: Speech is slurred, or the person is unable to speak.  ¨ T: Time to call 911. Even if symptoms go away, call 911.    When to seek medical advice  Call your healthcare provider right away if any of the following occur:  · Fluid or blood soaking 5 or more bandages a day during the first 3 days after surgery  · Fluid or blood still draining from the wound more than 3 days after surgery  · Increasing pain at the site of surgery  · Fever over 100.4º F (38.0º C)  · Redness around the wound  · Pus coming from the wound  · Vomiting, constipation, or diarrhea    Date Last Reviewed: 9/27/2015  © 7114-4236 [employer] [address] [place] [address]. All rights reserved. This information is not intended as a substitute for professional medical care. Always follow your healthcare professional's instructions.      Wound Check After Surgery, No Complication    It is normal to feel pain at the incision site. The pain decreases as the wound heals. Most of the pain and soreness from the skin incision should go away by the time the sutures or staples are removed. Soreness and pain from deeper tissues may last another week or two.  Pain that continues more than a few  weeks after surgery or pain that worsens anytime after surgery can be a sign of a problem, such as:  · Infection  · Separation of wound edges  · Collection of blood or other below the skin        Date Last Reviewed: 9/27/2015  © 8098-7003 [employer]. [address] [place] [address]. All rights reserved. This information is not intended as a substitute for professional medical care. Always follow your healthcare professional's instructions.

## 2020-01-24 NOTE — TRANSFER OF CARE
"Anesthesia Transfer of Care Note    Patient: Manisha Wynne    Procedure(s) Performed: Procedure(s) (LRB):  ORIF, FRACTURE, RADIUS, DISTAL-left (Left)    Patient location: PACU    Anesthesia Type: general    Transport from OR: Transported from OR on 6-10 L/min O2 by face mask with adequate spontaneous ventilation    Post pain: adequate analgesia    Post assessment: no apparent anesthetic complications and tolerated procedure well    Post vital signs: stable    Level of consciousness: sedated    Nausea/Vomiting: no nausea/vomiting    Complications: none    Transfer of care protocol was followed      Last vitals:   Visit Vitals  /61 (BP Location: Right arm, Patient Position: Lying)   Pulse 65   Temp 36.6 °C (97.9 °F)   Resp 18   Ht 5' 8" (1.727 m)   Wt 72.6 kg (160 lb)   LMP 10/01/2003   SpO2 100%   Breastfeeding? No   BMI 24.33 kg/m²     "

## 2020-01-27 ENCOUNTER — TELEPHONE (OUTPATIENT)
Dept: OTOLARYNGOLOGY | Facility: CLINIC | Age: 70
End: 2020-01-27

## 2020-01-27 ENCOUNTER — OFFICE VISIT (OUTPATIENT)
Dept: PSYCHIATRY | Facility: CLINIC | Age: 70
End: 2020-01-27
Payer: MEDICARE

## 2020-01-27 VITALS
HEIGHT: 68 IN | DIASTOLIC BLOOD PRESSURE: 63 MMHG | WEIGHT: 160 LBS | RESPIRATION RATE: 17 BRPM | BODY MASS INDEX: 24.25 KG/M2 | SYSTOLIC BLOOD PRESSURE: 122 MMHG | HEART RATE: 65 BPM | OXYGEN SATURATION: 97 % | TEMPERATURE: 98 F

## 2020-01-27 DIAGNOSIS — F98.8 ATTENTION DEFICIT DISORDER (ADD) IN ADULT: ICD-10-CM

## 2020-01-27 DIAGNOSIS — F33.41 MAJOR DEPRESSIVE DISORDER, RECURRENT EPISODE, IN PARTIAL REMISSION: Primary | ICD-10-CM

## 2020-01-27 PROCEDURE — 90834 PR PSYCHOTHERAPY W/PATIENT, 45 MIN: ICD-10-PCS | Mod: HCNC,S$GLB,, | Performed by: SOCIAL WORKER

## 2020-01-27 PROCEDURE — 99499 RISK ADDL DX/OHS AUDIT: ICD-10-PCS | Mod: HCNC,S$GLB,, | Performed by: SOCIAL WORKER

## 2020-01-27 PROCEDURE — 99499 UNLISTED E&M SERVICE: CPT | Mod: HCNC,S$GLB,, | Performed by: SOCIAL WORKER

## 2020-01-27 PROCEDURE — 90834 PSYTX W PT 45 MINUTES: CPT | Mod: HCNC,S$GLB,, | Performed by: SOCIAL WORKER

## 2020-01-27 PROCEDURE — 99999 PR PBB SHADOW E&M-EST. PATIENT-LVL I: CPT | Mod: PBBFAC,HCNC,, | Performed by: SOCIAL WORKER

## 2020-01-27 PROCEDURE — 99999 PR PBB SHADOW E&M-EST. PATIENT-LVL I: ICD-10-PCS | Mod: PBBFAC,HCNC,, | Performed by: SOCIAL WORKER

## 2020-01-27 NOTE — PROGRESS NOTES
Individual Psychotherapy (PhD/LCSW)    1/27/2020    Site: [facility]    Therapeutic Intervention: Met with patient alone.  Outpatient - Insight oriented psychotherapy 45 min - CPT code 07757    Chief complaint/reason for encounter: depression, distractibility     Interval history and content of current session: Pt had another fall, broke wrist, using good self-care including not driving, using a walking stick for balance, reaching out to others for help. 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 Pt has entered a clinical trial of a device that stimulates nerves in legs to help prevent falls.    Treatment plan:  · Target symptoms: depression, distractibility  · Why chosen therapy is appropriate versus another modality: relevant to diagnosis  · Outcome monitoring methods: self-report  · Therapeutic intervention type: insight oriented psychotherapy    Risk parameters:  Patient reports no suicidal ideation  Patient reports no homicidal ideation  Patient reports no self-injurious behavior  Patient reports no violent behavior    Verbal deficits: None    Patient's response to intervention:  The patient's response to intervention is motivated    Progress toward goals and other mental status changes:  The patient's progress toward goals is good    Diagnosis: Major depression, recurrent, in partial remission,  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

## 2020-01-27 NOTE — TELEPHONE ENCOUNTER
----- Message from Adelaida Zahira sent at 1/27/2020 11:27 AM CST -----  Contact: pt at [phone]  Mark pt-Pt wants her post op appt with Mark.  Pt missed an appt ans seems confused.  This is her second Post op appt at [facility].  Already had sutures taken out by Vera.  Please call.

## 2020-01-29 ENCOUNTER — TELEPHONE (OUTPATIENT)
Dept: ORTHOPEDICS | Facility: CLINIC | Age: 70
End: 2020-01-29

## 2020-01-29 NOTE — TELEPHONE ENCOUNTER
Tried to call patient in regards to her message, no answer at this time, will try again later.     Consuelo Allen LPN

## 2020-01-29 NOTE — TELEPHONE ENCOUNTER
Spoke with patient on the phone per her request.  Instructions given again on protecting cast during showering.  Patient also wanted staff to know she is going out of town for a few days but will be back for her appointment on the 7th of February.     Consuelo Allen LPN

## 2020-02-01 ENCOUNTER — PATIENT MESSAGE (OUTPATIENT)
Dept: ADMINISTRATIVE | Facility: OTHER | Age: 70
End: 2020-02-01

## 2020-02-03 RX ORDER — LOSARTAN POTASSIUM 25 MG/1
TABLET ORAL
Qty: 90 TABLET | Refills: 6 | Status: SHIPPED | OUTPATIENT
Start: 2020-02-03 | End: 2021-03-12

## 2020-02-07 ENCOUNTER — OFFICE VISIT (OUTPATIENT)
Dept: ORTHOPEDICS | Facility: CLINIC | Age: 70
End: 2020-02-07
Payer: MEDICARE

## 2020-02-07 VITALS
SYSTOLIC BLOOD PRESSURE: 151 MMHG | DIASTOLIC BLOOD PRESSURE: 87 MMHG | HEIGHT: 68 IN | HEART RATE: 74 BPM | WEIGHT: 160 LBS | BODY MASS INDEX: 24.25 KG/M2

## 2020-02-07 DIAGNOSIS — S52.552A OTHER CLOSED EXTRA-ARTICULAR FRACTURE OF DISTAL END OF LEFT RADIUS, INITIAL ENCOUNTER: Primary | ICD-10-CM

## 2020-02-07 PROCEDURE — 97760 ORTHOTIC MGMT&TRAING 1ST ENC: CPT | Mod: GP,HCNC,S$GLB, | Performed by: PHYSICIAN ASSISTANT

## 2020-02-07 PROCEDURE — 99024 PR POST-OP FOLLOW-UP VISIT: ICD-10-PCS | Mod: HCNC,S$GLB,, | Performed by: PHYSICIAN ASSISTANT

## 2020-02-07 PROCEDURE — 97760 PR ORTHOTIC MGMT&TRAINJ INITIAL ENC EA 15 MINS: ICD-10-PCS | Mod: GP,HCNC,S$GLB, | Performed by: PHYSICIAN ASSISTANT

## 2020-02-07 PROCEDURE — 99999 PR PBB SHADOW E&M-EST. PATIENT-LVL IV: CPT | Mod: PBBFAC,HCNC,, | Performed by: PHYSICIAN ASSISTANT

## 2020-02-07 PROCEDURE — 99999 PR PBB SHADOW E&M-EST. PATIENT-LVL IV: ICD-10-PCS | Mod: PBBFAC,HCNC,, | Performed by: PHYSICIAN ASSISTANT

## 2020-02-07 PROCEDURE — 99024 POSTOP FOLLOW-UP VISIT: CPT | Mod: HCNC,S$GLB,, | Performed by: PHYSICIAN ASSISTANT

## 2020-02-07 NOTE — PROGRESS NOTES
"Ms. Wynne is here today for a post-operative visit.  She is 14 days status post ORIF left distal radius by Dr. Moe on 1/24/20. She reports that she is doing well.  Pain is minimal.  She is taking pain medication as needed, using Tylenol prn and Norco about every other day.  She denies fever, chills, and sweats since the time of the surgery.     Physical exam:    Vitals:    02/07/20 1338   BP: (!) 151/87   Pulse: 74   Weight: 72.6 kg (160 lb)   Height: 5' 8" (1.727 m)   PainSc:   5     Vital signs are stable, patient is afebrile.  Patient is well dressed and well groomed, no acute distress.  Alert and oriented to person, place, and time.  Post op dressing taken down.  Incision is clean, dry and intact.  There is no erythema or exudate.  There is no sign of any infection. She is NVI. Sutures removed without difficulty.      Assessment:  status post ORIF left distal radius by Dr. Moe on 1/24/20    Plan:  Manisha was seen today for pain.    Diagnoses and all orders for this visit:    Other closed extra-articular fracture of distal end of left radius, initial encounter  -     X-Ray Wrist Complete Left; Future  -     Ambulatory referral/consult to Physical/Occupational Therapy; Future    - PO instruction reviewed and provided to patient  -left forearm brace given for full time use, no lifting/ weight bearing   -OT ordered  -RTC 4 wks with xray       15 min spent preparing/fitting/educating pt on brace.    Sarah Stapleton PA-C  [facility]   [facility] [place]      "

## 2020-02-10 ENCOUNTER — OFFICE VISIT (OUTPATIENT)
Dept: OTOLARYNGOLOGY | Facility: CLINIC | Age: 70
End: 2020-02-10
Payer: MEDICARE

## 2020-02-10 ENCOUNTER — OFFICE VISIT (OUTPATIENT)
Dept: PSYCHIATRY | Facility: CLINIC | Age: 70
End: 2020-02-10
Payer: MEDICARE

## 2020-02-10 VITALS
HEIGHT: 68 IN | BODY MASS INDEX: 24.25 KG/M2 | TEMPERATURE: 98 F | HEART RATE: 76 BPM | WEIGHT: 160 LBS | SYSTOLIC BLOOD PRESSURE: 127 MMHG | DIASTOLIC BLOOD PRESSURE: 81 MMHG

## 2020-02-10 DIAGNOSIS — F33.41 MAJOR DEPRESSIVE DISORDER, RECURRENT EPISODE, IN PARTIAL REMISSION: Primary | ICD-10-CM

## 2020-02-10 DIAGNOSIS — F98.8 ATTENTION DEFICIT DISORDER (ADD) IN ADULT: ICD-10-CM

## 2020-02-10 DIAGNOSIS — Z98.890 POST-OPERATIVE STATE: ICD-10-CM

## 2020-02-10 DIAGNOSIS — M95.0 NASAL DEFORMITY, ACQUIRED: Primary | ICD-10-CM

## 2020-02-10 DIAGNOSIS — J34.3 NASAL TURBINATE HYPERTROPHY: ICD-10-CM

## 2020-02-10 PROCEDURE — 90834 PR PSYCHOTHERAPY W/PATIENT, 45 MIN: ICD-10-PCS | Mod: HCNC,S$GLB,, | Performed by: SOCIAL WORKER

## 2020-02-10 PROCEDURE — 99999 PR PBB SHADOW E&M-EST. PATIENT-LVL I: CPT | Mod: PBBFAC,HCNC,, | Performed by: SOCIAL WORKER

## 2020-02-10 PROCEDURE — 99999 PR PBB SHADOW E&M-EST. PATIENT-LVL I: ICD-10-PCS | Mod: PBBFAC,HCNC,, | Performed by: SOCIAL WORKER

## 2020-02-10 PROCEDURE — 99024 PR POST-OP FOLLOW-UP VISIT: ICD-10-PCS | Mod: S$GLB,,, | Performed by: OTOLARYNGOLOGY

## 2020-02-10 PROCEDURE — 99024 POSTOP FOLLOW-UP VISIT: CPT | Mod: S$GLB,,, | Performed by: OTOLARYNGOLOGY

## 2020-02-10 PROCEDURE — 90834 PSYTX W PT 45 MINUTES: CPT | Mod: HCNC,S$GLB,, | Performed by: SOCIAL WORKER

## 2020-02-10 NOTE — PROGRESS NOTES
Two months S/P nasal reconstruction with bilateral auricular cartilage batten grafts,septo,turbs.  Doing well overall.  Septum straight,valves well supported. Moderate lateral wall edema remains.  Bilateral retained ear sutures removed.  Plan: Cont. Saline spray/gel  RTC 10 months or prn sooner

## 2020-02-11 NOTE — PROGRESS NOTES
"Individual Psychotherapy (PhD/LCSW)    2/10/2020    Site: [facility]    Therapeutic Intervention: Met with patient alone.  Outpatient - Insight oriented psychotherapy 45 min - CPT code 37631    Chief complaint/reason for encounter: depression, distractibility     Interval history and content of current session: Pt went to [employer], enjoyed herself, found she had things to say in discussions that were respected by others, but came away feeling "stupid" by comparing herself to keynote speaker from [place]. Pt enjoys friendships at conferences, lacks that in [place]. Discuss her Zoroastrianism participation, which keeps people at arm's length. Pt has contempt for some groups as shallow, but shies away from groups she sees as "smart". Confront pt's distorted narrative that perpetuates her isolation and inaction, leaving her stuck in depression. Challenge pt to change the narrative to be more accurate and hopeful.    Treatment plan:  · Target symptoms: depression, distractibility  · Why chosen therapy is appropriate versus another modality: relevant to diagnosis  · Outcome monitoring methods: self-report  · Therapeutic intervention type: insight oriented psychotherapy    Risk parameters:  Patient reports no suicidal ideation  Patient reports no homicidal ideation  Patient reports no self-injurious behavior  Patient reports no violent behavior    Verbal deficits: None    Patient's response to intervention:  The patient's response to intervention is motivated    Progress toward goals and other mental status changes:  The patient's progress toward goals is limited    Diagnosis: Major depression, recurrent, in partial remission,  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

## 2020-02-14 ENCOUNTER — HOSPITAL ENCOUNTER (OUTPATIENT)
Dept: RADIOLOGY | Facility: HOSPITAL | Age: 70
Discharge: HOME | End: 2020-02-14
Attending: INTERNAL MEDICINE
Payer: MEDICARE

## 2020-02-14 DIAGNOSIS — R91.1 LUNG NODULE: ICD-10-CM

## 2020-02-14 PROCEDURE — 71250 CT CHEST WITHOUT CONTRAST: ICD-10-PCS | Mod: 26,HCNC,, | Performed by: RADIOLOGY

## 2020-02-14 PROCEDURE — 71250 CT THORAX DX C-: CPT | Mod: TC,HCNC

## 2020-02-14 PROCEDURE — 71250 CT THORAX DX C-: CPT | Mod: 26,HCNC,, | Performed by: RADIOLOGY

## 2020-02-19 ENCOUNTER — CLINICAL SUPPORT (OUTPATIENT)
Dept: REHABILITATION | Facility: HOSPITAL | Age: 70
End: 2020-02-19
Payer: MEDICARE

## 2020-02-19 DIAGNOSIS — M25.60 RANGE OF MOTION DEFICIT: ICD-10-CM

## 2020-02-19 DIAGNOSIS — R29.898 DECREASED GRIP STRENGTH OF LEFT HAND: ICD-10-CM

## 2020-02-19 DIAGNOSIS — R29.898 DECREASED PINCH STRENGTH: ICD-10-CM

## 2020-02-19 DIAGNOSIS — S52.552A OTHER CLOSED EXTRA-ARTICULAR FRACTURE OF DISTAL END OF LEFT RADIUS, INITIAL ENCOUNTER: ICD-10-CM

## 2020-02-19 DIAGNOSIS — M25.532 WRIST PAIN, LEFT: ICD-10-CM

## 2020-02-19 PROCEDURE — 97165 OT EVAL LOW COMPLEX 30 MIN: CPT | Mod: HCNC

## 2020-02-19 PROCEDURE — 97110 THERAPEUTIC EXERCISES: CPT | Mod: HCNC

## 2020-02-19 NOTE — PROGRESS NOTES
See POC     Focus on Therapeutic Outcomes (FOTO) Symptom Scale: Patient score indicates self perception of 61% impairment, limitation or restriction of function upon initial assessment.

## 2020-02-19 NOTE — PLAN OF CARE
[facility] Occupational Therapy  Initial Hand Evaluation     Date: 2/19/2020  Name: Manisha Wynne  Clinic Number: [id number]    Medical Diagnosis: left DRF with ORIF 1/24/20   Therapy Diagnosis:   Encounter Diagnoses   Name Primary?    Wrist pain, left     Decreased  strength of left hand     Range of motion deficit     Decreased pinch strength      Physician: Sarah tSapleton PA-C    Physician Orders: eval and treat     Surgical Procedure and Date: 1/24/20; fixation with plate and screws   Evaluation Date: 2/19/2020    Plan of Care Certification Period: 4/19/2020  Date of Return to MD: 3/6/51991    Visit # / Visits authorized: 1 / pending   Insurance Authorization Period Expiration: pending   FOTO  (VISIT 2)     Time In:835 am   Time Out: 915 am   Total Billable Time: 40 minutes    Precautions:  Standard , FALL RISK     Subjective     Involved Side: left   Dominant Side: Right  Date of Onset: 1/24/20   Mechanism of Injury: fall on hand   History of Current Condition: Ms. Wynne is a 69 that fell and   sustained an intraarticular distal radius fracture.  An attempted   closed reduction was performed in the Emergency Room.  After much discussion   with the patient, we elected for surgical intervention.Now being referred for therapy   Imaging:  See chart; 3 part intraarticular fracture   Previous Therapy: none     Past Medical History/Physical Systems Review:   Manisha Wynne  has a past medical history of Abnormal Pap smear, Atrial fibrillation, AV block, 1st degree, Cataract, Depression, Facet arthritis of lumbar region, Falls, Hyperlipidemia, Hypertension, Neuropathy, Other specified cardiac dysrhythmias(427.89), Sleep apnea, and Syncope.    Manisha Wynne  has a past surgical history that includes Tonsillectomy; Dilation and curettage of uterus; Cardiac pacemaker placement (09/07/2012); Endoscopic ultrasound of upper gastrointestinal tract (N/A, 7/5/2019);  [place] tooth extraction; Nasal septoplasty (N/A, 12/19/2019); Application of cartilage graft (Bilateral, 12/19/2019); Surgical removal of nasal turbinate (Bilateral, 12/19/2019); and Open reduction and internal fixation (ORIF) of fracture of distal radius (Left, 1/24/2020).    Manisha has a current medication list which includes the following prescription(s): aspirin, bupropion, calcium-vitamin d3, cholecalciferol (vitamin d3), ferrous sulfate, flu vacc [id number](65yr up)pf, fluoxetine, hydrocodone-acetaminophen, ibuprofen, lisdexamfetamine, losartan, metoprolol tartrate, multivitamin-minerals-lutein, ondansetron, and pravastatin.    Review of patient's allergies indicates:  No Known Allergies     Patient's Goals for Therapy: regain full use of left hand     Pain:  Functional Pain Scale Rating 0-10:   0/10 on average  0/10 at best  7/10 at worst  Location: left wrist   Description: Aching, Dull and sore with movement   Aggravating Factors: movement   Easing Factors: rest and brace     Occupation:  Retired, works part time computer work   Working presently: employed part time, not working now   Duties: typing     Functional Limitations/Social History:    Previous functional status includes: Independent with all ADLs.     Current FunctionalStatus   Home/Living environment : lives alone      Limitation of Functional Status as follows:   ADLs/IADLs:     - Feeding: IND     - Bathing/ Hygiene: IND     - Dressing/Grooming: IND     - Driving: limited use for gripping for driving     - writing / typing no difficulty reported     - /pinch limited use for gripping, holding things, lifting things, changing cat litter, emptying trash     - work /home activities ; cooking, lifting, carrying      Leisure: taking care of cats         Objective     Observation/Appearance:  Wearing OTC brace, min edema        Sensation:  Intact     Wound/Scar:   7 cm volar wrist scar adhesion, glue removed this date.     Edema. Measured in  "centimeters.   PWC 17.3 cm     Hand / Wrist ROM. Measured in degrees.     wnl's for digits/thumb     Wrist ext/flex 40 / 45   RD / UD 10 / 20   Sup/pron wnl/ 75 w/pain          Manual Muscle Testing   ECRL/B 3-/5   FCR/FCU 3/5   SUP 3/5   PRO 3-/5       Special Tests:  NT         Strength (Dyanmometer) and Pinch Strength (Pinch Gauge)  Measured in pounds and psi. Average of three trials.- TBA at6 weeks        CMS Impairment/Limitation/Restriction for FOTO Initial Survey    Therapist reviewed FOTO scores for Manisha Wynne on 2/19/2020.   FOTO documents entered into [employer] - see Media section.    Limitation Score: _______%  Category: "Carrying"         Treatment     Treatment Time In: 9  Treatment Time Out: 915 am   Total Treatment time separate from Evaluation time:15 min     Manisha received the following supervised modalities after being cleared for contradictions for 10 minutes:   -Patient received paraffin bath to 5 hand(s) for 10 minutes to increase blood flow, circulation, pain management and for tissue elasticity prior to therex.     Manisha received therapeutic exercises for 10 minutes including:  -AROM for wrist flex, ext, RD, UD, sup/pron x 10 reps each, 3-4 x daily   - scar massage x 5 min 1-2 x daily   - modalities as needed for pain, swelling, stiffness   - use brace x 2 weeks, ok to remove for HEP, light ADLS      Home Exercise Program/Education:  Issued HEP (see patient instructions in EMR) and educated on  use of hot/cold modality use for pain, stiffness and edema management . Exercises were reviewed and Manisha was able to demonstrate them prior to the end of the session.   Pt received a written copy of exercises to perform at home. Manisha demonstrated good  understanding of the education provided.  Pt was advised to perform these exercises with minimal pain/discomfort of 3-4 out of 10 at worse, discontinue if pain worsens.    Patient/Family Education: role of OT, goals for OT, " scheduling/cancellations - pt verbalized understanding. Discussed insurance limitations with patient.    Additional Education provided: per above    Assessment     Manisha Wynne is a 69 y.o. year old referred to outpatient occupational therapy and presents with a medical diagnosis of left DRF with ORIF , resulting in Decreased ROM, Decreased  strength, Decreased pinch strength, Decreased muscle strength, Decreased functional hand use, Increased pain, Edema and Scar Adhesions and demonstrates limitations as described in the chart below.   Following medical record review it is determined that pt will benefit from occupational therapy services in order to maximize pain free and/or functional use of left hand/UE   The following goals were discussed with the patient and patient is in agreement with them as to be addressed in the treatment plan. The patient's rehab potential is Good.     Anticipated barriers to occupational therapy: None   Pt has no cultural, educational or language barriers to learning provided.    Profile and History Assessment of Occupational Performance Level of Clinical Decision Making Complexity Score   Occupational Profile:   Manisha Wynne is a 69 y.o. female who lives alone and is currently employed as part time computer worker . Manisha Wynne has difficulty with    driving/transportation management, shopping, phone/computer use, housework/household chores and taking out trash, lifting, changing cat litter.   affecting his/her daily functional abilities. His/her main goal for therapy is regain full use of left hand   .     Comorbidities:   Abnormal Pap smear, Atrial fibrillation, AV block, 1st degree, Cataract, Depression, Facet arthritis of lumbar region, Falls, Hyperlipidemia, Hypertension, Neuropathy, Other specified cardiac dysrhythmias(427.89), Sleep apnea, and Syncope.    Medical and Therapy History Review:   Brief               Performance  Deficits    Physical:  Joint Mobility  Joint Stability  Muscle Power/Strength  Muscle Endurance  Skin Integrity/Scar Formation  Edema   Strength  Pinch Strength  Gross Motor Coordination  Fine Motor Coordination  Pain    Cognitive:  No Deficits    Psychosocial:    Habits  Routines     Clinical Decision Making:  low    Assessment Process:  Problem-Focused Assessments    Modification/Need for Assistance:  Not Necessary    Intervention Selection:  Limited Treatment Options       Low   Based on PMHX, co morbidities , data from assessments and functional level of assistance required with task and clinical presentation directly impacting function.       The following goals were discussed with the patient and patient is in agreement with them as to be addressed in the treatment plan.       Goals:   Short Term Goals (4 weeks)   1)  Patient to be IND with HEP and modalities for pain management  2)  Increase ROM 3-9 degrees to increase functional hand use for ADLs/work/leisure activities  3)   Decrease edema .1-.3 mm to increase joint mobility /flexibility for functional hand use.   4)  Assess  and pinch and set goals accordingly   5)  Patient to score at ______  or less on FOTO to demonstrate improved perception of functional left hand  Use.      Long Term Goals (8 weeks)   1)  Increase  strength 2-8 lbs. For emptying litter box and taking out trash   2)  Increase pinch strength 1-4 psi's for typing and FM activity   3) Increase ROM 10-20 degrees to increase functional hand use for ADLs/work/leisure activities      Plan   Recommend continued Outpatient Occupataional Therapy  1x week for 8 weeks to include the following interventions Paraffin, Manual therapy/joint mobilizations, Modalities for pain management, US 3 mhz, Therapeutic exercises/activities., Strengthening, Edema Control, Scar Management, Wound Care and Electrical Modalities.    Within the Certification Period/Plan of care expiration: 2/19/2020 to  4/19/2020.      Desi Medellin OT, CHT

## 2020-02-19 NOTE — PATIENT INSTRUCTIONS
[facility], OCCUPATIONAL THERAPY  HOME EXERCISE PROGRAM     Soak hand in hot water or use hot wet compress for 5-10  Min before exercises or in the morning to decrease stiffness     Use cold pack x 10 at night for pain or swelling as needed     Complete the following massages for 5 min each, 1-2x/day.                                            Complete the following exercises with 10 repetitions each, 3-4x/day.     AROM: Supination / Pronation   With your elbow by your side, turn your palm up then turn your palm down.     AROM: Wrist Flexion / Extension               Bend your wrist forward and back as far as possible.      AROM: Wrist Radial / Ulnar Deviation  Bend your wrist from side to side as far as possible.      AROM: Wrist Radial / Ulnar Deviation   Make a fist then bend your wrist toward your body, then away.         Copyright © [employer]. All rights reserved.     Therapist: CHIOMA Reyes CHT

## 2020-02-24 ENCOUNTER — CLINICAL SUPPORT (OUTPATIENT)
Dept: REHABILITATION | Facility: HOSPITAL | Age: 70
End: 2020-02-24
Payer: MEDICARE

## 2020-02-24 DIAGNOSIS — M25.532 WRIST PAIN, LEFT: Primary | ICD-10-CM

## 2020-02-24 DIAGNOSIS — R29.898 DECREASED PINCH STRENGTH: ICD-10-CM

## 2020-02-24 DIAGNOSIS — M25.60 RANGE OF MOTION DEFICIT: ICD-10-CM

## 2020-02-24 DIAGNOSIS — R29.898 DECREASED GRIP STRENGTH OF LEFT HAND: ICD-10-CM

## 2020-02-24 PROCEDURE — 97018 PARAFFIN BATH THERAPY: CPT | Mod: 59,HCNC

## 2020-02-24 PROCEDURE — 97140 MANUAL THERAPY 1/> REGIONS: CPT | Mod: HCNC

## 2020-02-24 PROCEDURE — 97110 THERAPEUTIC EXERCISES: CPT | Mod: HCNC

## 2020-02-24 NOTE — PROGRESS NOTES
"  Occupational Therapy Daily Treatment Note     Date: 2/24/2020  Name: Manisha Wynne  Clinic Number: [id number]    Medical Diagnosis: left DRF with ORIF 1/24/20   Therapy Diagnosis:        Encounter Diagnoses   Name Primary?    Wrist pain, left      Decreased  strength of left hand      Range of motion deficit      Decreased pinch strength        Physician: Sarah Stapleton PA-C     Physician Orders: eval and treat      Surgical Procedure and Date: 1/24/20; fixation with plate and screws   Evaluation Date: 2/19/2020     Plan of Care Certification Period: 4/19/2020  Date of Return to MD: 3/6/89172     Visit # / Visits authorized: 2 / 12  Insurance Authorization Period Expiration: 8/4/2020  FOTO  (VISIT 2- 61%)      Time In:835 am   Time Out: 915 am   Total Billable Time: 40 minutes     Precautions:  Standard , FALL RISK      Subjective     Pt reports: "THe scar still feels bruised, I don't like to look at it."   she was compliant with home exercise program given last session.   Response to previous treatment: more motion   Functional change: no change reported     Pain: 2/10  Location: left wrist       Objective   Patient 20 min late this session.     Manisha received the following supervised modalities after being cleared for contradictions for 10 minutes:   -Patient received paraffin bath to left hand(s) for 10 minutes to increase blood flow, circulation, pain management and for tissue elasticity prior to therex.      Manisha received therapeutic exercises for 20 minutes including:  -AROM for wrist flex, ext (with and without gravity assisted) , RD, UD, sup/pron x 10 reps each,  - supination wheel for sup/pron x 10 reps each   - 0 # dowel for sup/pron x 20 reps each   - prayer stretch x 10 reps   - wrist dexterciser for wrist mobility x 3 min   - Reviewed modalities as needed for pain, swelling, stiffness   - use brace x 2 weeks, ok to remove for HEP, light ADLS    Manisha received the following manual " therapy techniques for 10 minutes:   -scar massage with mini vibrator and large massage stick to increase blood flow, circulation, tissue elasticity and to improve tensile glide   - Brief RM to decrease edema and to improve lymphatic drainage   - Brief PROM of digits and distraction/gentle mobilization of wrist to improve joint mobility /flexibility     Home Exercises and Education Provided     Education provided:   - cont HEP  - Progress towards goals     Written Home Exercises Provided: Patient instructed to cont prior HEP.  Exercises were reviewed and Manisha was able to demonstrate them prior to the end of the session.  Manisha demonstrated good  understanding of the HEP provided.   .   See EMR under Patient Instructions for exercises provided prior visit.        Assessment       Manisha is progressing well towards her goals and there are no updates to goals at this time. Pt prognosis is Good.     ROM improving, Pronation remains most limited at end range.  Mild discomfort with scar mobilization, otherwise progressing well.  Will progress with strengthening following MD visit on 3/6/20.  Pt will continue to benefit from skilled outpatient occupational therapy to address the deficits listed in the problem list on initial evaluation to improve ROM, strength, pain management, /pinch strength, functional use, scar and edema management and to maximize pt's level of independence with ADLs in the home, work  and community environment.     Anticipated barriers to occupational therapy: None     Pt's spiritual, cultural and educational needs considered and pt agreeable to plan of care and goals.    The following goals were discussed with the patient and patient is in agreement with them as to be addressed in the treatment plan.       Goals:   Short Term Goals (4 weeks)   1)  Patient to be IND with HEP and modalities for pain management  2)  Increase ROM 3-9 degrees to increase functional hand use for ADLs/work/leisure  activities  3)   Decrease edema .1-.3 mm to increase joint mobility /flexibility for functional hand use.   4)  Assess  and pinch and set goals accordingly   5)  Patient to score at __40-60%____  or less on FOTO to demonstrate improved perception of functional left hand  Use.        Long Term Goals (8 weeks)   1)  Increase  strength 2-8 lbs. For emptying litter box and taking out trash   2)  Increase pinch strength 1-4 psi's for typing and FM activity   3) Increase ROM 10-20 degrees to increase functional hand use for ADLs/work/leisure activities        Plan   Recommend continued Outpatient Occupataional Therapy  1x week for 8 weeks to include the following interventions Paraffin, Manual therapy/joint mobilizations, Modalities for pain management, US 3 mhz, Therapeutic exercises/activities., Strengthening, Edema Control, Scar Management, Wound Care and Electrical Modalities.     Within the Certification Period/Plan of care expiration: 2/19/2020 to 4/19/2020.        Desi Medellin, OT, CHT

## 2020-03-02 ENCOUNTER — OFFICE VISIT (OUTPATIENT)
Dept: PSYCHIATRY | Facility: CLINIC | Age: 70
End: 2020-03-02
Payer: MEDICARE

## 2020-03-02 DIAGNOSIS — F33.1 MAJOR DEPRESSIVE DISORDER, RECURRENT, MODERATE: Primary | ICD-10-CM

## 2020-03-02 DIAGNOSIS — F98.8 ATTENTION DEFICIT DISORDER (ADD) IN ADULT: ICD-10-CM

## 2020-03-02 PROCEDURE — 90834 PSYTX W PT 45 MINUTES: CPT | Mod: HCNC,S$GLB,, | Performed by: SOCIAL WORKER

## 2020-03-02 PROCEDURE — 90834 PR PSYCHOTHERAPY W/PATIENT, 45 MIN: ICD-10-PCS | Mod: HCNC,S$GLB,, | Performed by: SOCIAL WORKER

## 2020-03-02 PROCEDURE — 99999 PR PBB SHADOW E&M-EST. PATIENT-LVL I: CPT | Mod: PBBFAC,HCNC,, | Performed by: SOCIAL WORKER

## 2020-03-02 PROCEDURE — 99999 PR PBB SHADOW E&M-EST. PATIENT-LVL I: ICD-10-PCS | Mod: PBBFAC,HCNC,, | Performed by: SOCIAL WORKER

## 2020-03-03 NOTE — PROGRESS NOTES
"Individual Psychotherapy (PhD/LCSW)    3/2/2020    Site: [facility]    Therapeutic Intervention: Met with patient alone.  Outpatient - Insight oriented psychotherapy 45 min - CPT code 12868    Chief complaint/reason for encounter: depression, distractibility     Interval history and content of current session: Pt states she is not doing well, states she did not take her Vyvanse today, talks about recent social efforts, then reveals she was let go from her job. She is taking some steps to find other employment. Pt states "I know you will be mad but I think I'm a bad person." Help pt notice that if I don't argue about this she feels uncared-for, but if I do, she digs in and persists in the belief. Pt has had some feelings of hopelessness ("I'm in a pit and can't see myself getting out") and passive suicidal thoughts but immediately contradicts them. Ask pt to talk with her psychiatrist about her meds and consider what we can do in [place]. She is in touch with friends, participating in kaleor. Pt has been unable to drive for over a month since last fall, feels unsteady on her feet, and this has contributed to her depression. She expects to be able to drive soon.    Treatment plan:  · Target symptoms: depression, distractibility  · Why chosen therapy is appropriate versus another modality: relevant to diagnosis  · Outcome monitoring methods: self-report  · Therapeutic intervention type: insight oriented psychotherapy    Risk parameters:  Patient reports no suicidal ideation  Patient reports no homicidal ideation  Patient reports no self-injurious behavior  Patient reports no violent behavior    Verbal deficits: None    Patient's response to intervention:  The patient's response to intervention is motivated    Progress toward goals and other mental status changes:  The patient's progress toward goals is limited    Diagnosis: Major depression, recurrent, moderate,  ADD in adult     Plan:  individual " psychotherapy    Return to clinic: f/u as scheduled

## 2020-03-05 ENCOUNTER — PATIENT MESSAGE (OUTPATIENT)
Dept: ADMINISTRATIVE | Facility: OTHER | Age: 70
End: 2020-03-05

## 2020-03-06 ENCOUNTER — HOSPITAL ENCOUNTER (OUTPATIENT)
Dept: RADIOLOGY | Facility: OTHER | Age: 70
Discharge: HOME | End: 2020-03-06
Attending: PHYSICIAN ASSISTANT
Payer: MEDICARE

## 2020-03-06 ENCOUNTER — OFFICE VISIT (OUTPATIENT)
Dept: ORTHOPEDICS | Facility: CLINIC | Age: 70
End: 2020-03-06
Payer: MEDICARE

## 2020-03-06 ENCOUNTER — CLINICAL SUPPORT (OUTPATIENT)
Dept: REHABILITATION | Facility: HOSPITAL | Age: 70
End: 2020-03-06
Attending: PHYSICIAN ASSISTANT
Payer: MEDICARE

## 2020-03-06 VITALS
WEIGHT: 160 LBS | SYSTOLIC BLOOD PRESSURE: 132 MMHG | HEIGHT: 68 IN | BODY MASS INDEX: 24.25 KG/M2 | HEART RATE: 77 BPM | DIASTOLIC BLOOD PRESSURE: 81 MMHG

## 2020-03-06 DIAGNOSIS — S52.552A OTHER CLOSED EXTRA-ARTICULAR FRACTURE OF DISTAL END OF LEFT RADIUS, INITIAL ENCOUNTER: ICD-10-CM

## 2020-03-06 DIAGNOSIS — M25.60 RANGE OF MOTION DEFICIT: ICD-10-CM

## 2020-03-06 DIAGNOSIS — S52.552A OTHER CLOSED EXTRA-ARTICULAR FRACTURE OF DISTAL END OF LEFT RADIUS, INITIAL ENCOUNTER: Primary | ICD-10-CM

## 2020-03-06 DIAGNOSIS — R29.898 DECREASED GRIP STRENGTH OF LEFT HAND: ICD-10-CM

## 2020-03-06 DIAGNOSIS — R29.898 DECREASED PINCH STRENGTH: ICD-10-CM

## 2020-03-06 DIAGNOSIS — M25.532 WRIST PAIN, LEFT: ICD-10-CM

## 2020-03-06 PROCEDURE — 73110 XR WRIST COMPLETE 3 VIEWS LEFT: ICD-10-PCS | Mod: 26,HCNC,LT, | Performed by: RADIOLOGY

## 2020-03-06 PROCEDURE — 73110 X-RAY EXAM OF WRIST: CPT | Mod: 26,HCNC,LT, | Performed by: RADIOLOGY

## 2020-03-06 PROCEDURE — 97140 MANUAL THERAPY 1/> REGIONS: CPT | Mod: HCNC

## 2020-03-06 PROCEDURE — 99024 POSTOP FOLLOW-UP VISIT: CPT | Mod: HCNC,S$GLB,, | Performed by: PHYSICIAN ASSISTANT

## 2020-03-06 PROCEDURE — 97110 THERAPEUTIC EXERCISES: CPT | Mod: HCNC

## 2020-03-06 PROCEDURE — 73110 X-RAY EXAM OF WRIST: CPT | Mod: TC,HCNC,FY,LT

## 2020-03-06 PROCEDURE — 99024 PR POST-OP FOLLOW-UP VISIT: ICD-10-PCS | Mod: HCNC,S$GLB,, | Performed by: PHYSICIAN ASSISTANT

## 2020-03-06 PROCEDURE — 99999 PR PBB SHADOW E&M-EST. PATIENT-LVL III: ICD-10-PCS | Mod: PBBFAC,HCNC,, | Performed by: PHYSICIAN ASSISTANT

## 2020-03-06 PROCEDURE — 97018 PARAFFIN BATH THERAPY: CPT | Mod: HCNC

## 2020-03-06 PROCEDURE — 99999 PR PBB SHADOW E&M-EST. PATIENT-LVL III: CPT | Mod: PBBFAC,HCNC,, | Performed by: PHYSICIAN ASSISTANT

## 2020-03-06 NOTE — PROGRESS NOTES
"  Occupational Therapy Daily Treatment Note     Date: 3/6/2020  Name: Manisha Wynne  Clinic Number: [id number]    Medical Diagnosis: left DRF with ORIF 1/24/20   Therapy Diagnosis:        Encounter Diagnoses   Name Primary?    Wrist pain, left      Decreased  strength of left hand      Range of motion deficit      Decreased pinch strength        Physician: Sarah Stapleton PA-C     Physician Orders: eval and treat      Surgical Procedure and Date: 1/24/20; fixation with plate and screws   Evaluation Date: 2/19/2020     Plan of Care Certification Period: 4/19/2020  Date of Return to MD: 3/6/07560     Visit # / Visits authorized: 3 / 12  Insurance Authorization Period Expiration: 8/4/2020  FOTO  (VISIT 2- 61%)      Time In:9:40 am   Time Out: 10:40 am   Total Billable Time: 45 minutes     Precautions:  Standard , FALL RISK      Subjective     Pt reports: "I have an area on the side of my hand that is getting a blister. I think the brace is rubbing against it". Patient reported less discomfort after modifications were made to pre-bria wrist splint.   she was compliant with home exercise program given last session.   Response to previous treatment: more motion   Functional change: no change reported     Pain:  1/10  Location: left wrist       Objective   Patient 10 min late this session.     Manisha received the following supervised modalities after being cleared for contradictions for 10 minutes:   -Patient received paraffin bath to left hand(s) for 10 minutes to increase blood flow, circulation, pain management and for tissue elasticity prior to therex.      Manisha received therapeutic exercises for 15 minutes including:  -AROM for wrist flex, ext (with and without gravity assisted) , RD, UD, sup/pron x 10 reps each,  - supination wheel for sup/pron x 10 reps each   - 0 # dowel for sup/pron x 20 reps each   - prayer stretch x 10 reps   - wrist dexterciser for wrist mobility x 3 min   - Reviewed modalities " as needed for pain, swelling, stiffness   - continue use brace x 2 weeks, ok to remove for HEP, light ADLS    Manisha received the following manual therapy techniques for 20 minutes:   -scar massage with mini vibrator and large massage stick to increase blood flow, circulation, tissue elasticity and to improve tensile glide   - Brief RM to decrease edema and to improve lymphatic drainage   - Brief PROM of digits and distraction/gentle mobilization of wrist to improve joint mobility /flexibility     Home Exercises and Education Provided     Education provided:   - Reviewed HEP  - Progress towards goals     Written Home Exercises Provided: Patient instructed to cont prior HEP.  Exercises were reviewed and Manisha was able to demonstrate them prior to the end of the session.  Manisha demonstrated good  understanding of the HEP provided.     See EMR under Patient Instructions for exercises provided prior visit.        Assessment       Manisha is progressing well towards her goals and there are no updates to goals at this time. Pt prognosis is Good.     Patient continues to make steady gains in increased AROM of L forearm/wrist, in all planes of joint mobility.  Her pain level remains low, but she does have an increase in pain with scar management, as she is noted to have a couple of areas that have adhesions to underlyig soft tissue.  She tolerated treatment fairly well today.  Will progress with strengthening following MD visit on 3/6/20.  Pt will continue to benefit from skilled outpatient occupational therapy to address the deficits listed in the problem list on initial evaluation to improve ROM, strength, pain management, /pinch strength, functional use, scar and edema management and to maximize pt's level of independence with ADLs in the home, work  and community environment.     Anticipated barriers to occupational therapy: None     Pt's spiritual, cultural and educational needs considered and pt agreeable to  plan of care and goals.    The following goals were discussed with the patient and patient is in agreement with them as to be addressed in the treatment plan.       Goals:   Short Term Goals (4 weeks)   1)  Patient to be IND with HEP and modalities for pain management  2)  Increase ROM 3-9 degrees to increase functional hand use for ADLs/work/leisure activities  3)   Decrease edema .1-.3 mm to increase joint mobility /flexibility for functional hand use.   4)  Assess  and pinch and set goals accordingly   5)  Patient to score at __40-60%____  or less on FOTO to demonstrate improved perception of functional left hand  Use.        Long Term Goals (8 weeks)   1)  Increase  strength 2-8 lbs. For emptying litter box and taking out trash   2)  Increase pinch strength 1-4 psi's for typing and FM activity   3) Increase ROM 10-20 degrees to increase functional hand use for ADLs/work/leisure activities        Plan     Recommend continued Outpatient Occupataional Therapy  1x week for 8 weeks to include the following interventions Paraffin, Manual therapy/joint mobilizations, Modalities for pain management, US 3 mhz, Therapeutic exercises/activities., Strengthening, Edema Control, Scar Management, Wound Care and Electrical Modalities.     Within the Certification Period/Plan of care expiration: 2/19/2020 to 4/19/2020.        Desi Medellin OT, CHT

## 2020-03-06 NOTE — PROGRESS NOTES
"Ms. Wynne is here today for a post-operative visit.  She is 6 weeks status post ORIF left distal radius by Dr. Moe on 1/24/20. She reports that she is doing well. She has begun OT. She has occasional pain, usually when doing things with the hand, reports she has not lifted more than 10 lbs. She is not taking pain medication.  She denies fever, chills, and sweats since the time of the surgery.     Physical exam:    Vitals:    03/06/20 0858   BP: 132/81   Pulse: 77   Weight: 72.6 kg (160 lb)   Height: 5' 8" (1.727 m)   PainSc:   1     Vital signs are stable, patient is afebrile.  Patient is well dressed and well groomed, no acute distress.  Alert and oriented to person, place, and time.  Incision is clean, dry and intact, well healed.  There is no erythema or exudate.  There is no sign of any infection. She is NVI. Good wrist and finger motion. No significant ttp over fracture site.     Assessment:  status post ORIF left distal radius by Dr. Moe on 1/24/20    Plan:  Manisha was seen today for pain.    Diagnoses and all orders for this visit:    Other closed extra-articular fracture of distal end of left radius, initial encounter  -     X-Ray Wrist Complete Left; Future    - PO instruction reviewed and provided to patient  -discussed weight bearing restrictions  -continue OT/HEP, begin light strengthening/ weight bearing, gradually wean from brace  -RTC 6 wks with mallory Stapleton PA-C  [facility]   [facility] [place], [place]      "

## 2020-03-12 ENCOUNTER — CLINICAL SUPPORT (OUTPATIENT)
Dept: REHABILITATION | Facility: HOSPITAL | Age: 70
End: 2020-03-12
Payer: MEDICARE

## 2020-03-12 DIAGNOSIS — R29.898 DECREASED GRIP STRENGTH OF LEFT HAND: ICD-10-CM

## 2020-03-12 DIAGNOSIS — M25.60 RANGE OF MOTION DEFICIT: ICD-10-CM

## 2020-03-12 DIAGNOSIS — M25.532 WRIST PAIN, LEFT: ICD-10-CM

## 2020-03-12 DIAGNOSIS — R29.898 DECREASED PINCH STRENGTH: ICD-10-CM

## 2020-03-12 PROCEDURE — 97140 MANUAL THERAPY 1/> REGIONS: CPT | Mod: HCNC

## 2020-03-12 PROCEDURE — 97110 THERAPEUTIC EXERCISES: CPT | Mod: HCNC

## 2020-03-12 PROCEDURE — 97018 PARAFFIN BATH THERAPY: CPT | Mod: 59,HCNC

## 2020-03-12 NOTE — PROGRESS NOTES
"  Occupational Therapy Daily Treatment Note     Date: 3/12/2020  Name: Manisha Wynne  Clinic Number: [id number]    Medical Diagnosis: left DRF with ORIF 1/24/20   Therapy Diagnosis:        Encounter Diagnoses   Name Primary?    Wrist pain, left      Decreased  strength of left hand      Range of motion deficit      Decreased pinch strength        Physician: Sarah Stapleton PA-C     Physician Orders: eval and treat      Surgical Procedure and Date: 1/24/20; fixation with plate and screws   Evaluation Date: 2/19/2020     Plan of Care Certification Period: 4/19/2020  Date of Return to MD: 3/6/99391     Visit # / Visits authorized: 4 / 12  Insurance Authorization Period Expiration: 8/4/2020  FOTO  (VISIT 2- 61%)      Time In:715  Time Out: 8  Total Billable Time: 45 minutes     Precautions:  Standard , FALL RISK      Subjective     Pt reports: "It was sore after the last session and I didn't sleep well last night so its stiff today". Patient reported she is going out of town this weekend, therefore deferred strengthening till return next week.   Patient reported less discomfort after modifications were made to pre-bria wrist splint.   she was compliant with home exercise program given last session.   Response to previous treatment: more motion   Functional change: no change reported     Pain:  3/10  Location: left wrist       Objective     Manisha received the following supervised modalities after being cleared for contradictions for 10 minutes:   -Patient received paraffin bath to left hand(s) for 10 minutes to increase blood flow, circulation, pain management and for tissue elasticity prior to therex.      Manisha received therapeutic exercises for 15 minutes including:  -AROM for wrist flex, ext (with and without gravity assisted) , RD, UD, sup/pron x 10 reps each,  - added gentle prom for wrist flex/extension x 10 reps to increase joint mobility   - supination wheel for sup/pron x 10 reps each   - 0 # " dowel for sup/pron x 20 reps each   - prayer stretch x 10 reps   - wrist dexterciser for wrist mobility x 3 min   - Reviewed modalities as needed for pain, swelling, stiffness   - continue use brace x 2 weeks, ok to remove for HEP, light ADLS  - will progress with strengthening next session    Manisha received the following manual therapy techniques for 20 minutes:   -scar massage with mini vibrator and large massage stick to increase blood flow, circulation, tissue elasticity and to improve tensile glide   - Brief RM to decrease edema and to improve lymphatic drainage   - Brief PROM of digits and distraction/gentle mobilization of wrist to improve joint mobility /flexibility     Home Exercises and Education Provided     Education provided:   - Reviewed HEP  - Progress towards goals     Written Home Exercises Provided: Patient instructed to cont prior HEP.  Exercises were reviewed and Manihsa was able to demonstrate them prior to the end of the session.  Manisha demonstrated good  understanding of the HEP provided.     See EMR under Patient Instructions for exercises provided prior visit.        Assessment       Manisha is progressing well towards her goals and there are no updates to goals at this time. Pt prognosis is Good.     Patient continues to make steady gains in increased AROM of L forearm/wrist, in all planes of joint mobility.   She tolerated treatment fairly well today.  Will progress with strengthening next week.  Pt will continue to benefit from skilled outpatient occupational therapy to address the deficits listed in the problem list on initial evaluation to improve ROM, strength, pain management, /pinch strength, functional use, scar and edema management and to maximize pt's level of independence with ADLs in the home, work  and community environment.     Anticipated barriers to occupational therapy: None     Pt's spiritual, cultural and educational needs considered and pt agreeable to plan of  care and goals.    The following goals were discussed with the patient and patient is in agreement with them as to be addressed in the treatment plan.       Goals:   Short Term Goals (4 weeks)   1)  Patient to be IND with HEP and modalities for pain management  2)  Increase ROM 3-9 degrees to increase functional hand use for ADLs/work/leisure activities- progressing   3)   Decrease edema .1-.3 mm to increase joint mobility /flexibility for functional hand use. - progressing   4)  Assess  and pinch and set goals accordingly   5)  Patient to score at __40-60%____  or less on FOTO to demonstrate improved perception of functional left hand  Use.        Long Term Goals (8 weeks)   1)  Increase  strength 2-8 lbs. For emptying litter box and taking out trash   2)  Increase pinch strength 1-4 psi's for typing and FM activity   3) Increase ROM 10-20 degrees to increase functional hand use for ADLs/work/leisure activities        Plan     Recommend continued Outpatient Occupataional Therapy  1x week for 8 weeks to include the following interventions Paraffin, Manual therapy/joint mobilizations, Modalities for pain management, US 3 mhz, Therapeutic exercises/activities., Strengthening, Edema Control, Scar Management, Wound Care and Electrical Modalities.     Within the Certification Period/Plan of care expiration: 2/19/2020 to 4/19/2020.        Desi Medellin OT, CHT

## 2020-03-16 ENCOUNTER — OFFICE VISIT (OUTPATIENT)
Dept: PSYCHIATRY | Facility: CLINIC | Age: 70
End: 2020-03-16
Payer: MEDICARE

## 2020-03-16 ENCOUNTER — PATIENT MESSAGE (OUTPATIENT)
Dept: PSYCHIATRY | Facility: CLINIC | Age: 70
End: 2020-03-16

## 2020-03-16 DIAGNOSIS — F98.8 ATTENTION DEFICIT DISORDER (ADD) IN ADULT: ICD-10-CM

## 2020-03-16 DIAGNOSIS — F33.41 MAJOR DEPRESSIVE DISORDER, RECURRENT EPISODE, IN PARTIAL REMISSION: Primary | ICD-10-CM

## 2020-03-16 PROCEDURE — 90834 PSYTX W PT 45 MINUTES: CPT | Mod: HCNC,S$GLB,, | Performed by: SOCIAL WORKER

## 2020-03-16 PROCEDURE — 90834 PR PSYCHOTHERAPY W/PATIENT, 45 MIN: ICD-10-PCS | Mod: HCNC,S$GLB,, | Performed by: SOCIAL WORKER

## 2020-03-18 ENCOUNTER — PATIENT MESSAGE (OUTPATIENT)
Dept: REHABILITATION | Facility: HOSPITAL | Age: 70
End: 2020-03-18

## 2020-03-19 ENCOUNTER — CLINICAL SUPPORT (OUTPATIENT)
Dept: REHABILITATION | Facility: HOSPITAL | Age: 70
End: 2020-03-19
Payer: MEDICARE

## 2020-03-19 DIAGNOSIS — M25.60 RANGE OF MOTION DEFICIT: ICD-10-CM

## 2020-03-19 DIAGNOSIS — R29.898 DECREASED GRIP STRENGTH OF LEFT HAND: ICD-10-CM

## 2020-03-19 DIAGNOSIS — R29.898 DECREASED PINCH STRENGTH: ICD-10-CM

## 2020-03-19 DIAGNOSIS — M25.532 WRIST PAIN, LEFT: ICD-10-CM

## 2020-03-19 PROCEDURE — 97140 MANUAL THERAPY 1/> REGIONS: CPT | Mod: HCNC,PO

## 2020-03-19 PROCEDURE — 97018 PARAFFIN BATH THERAPY: CPT | Mod: 59,HCNC,PO

## 2020-03-19 PROCEDURE — 97110 THERAPEUTIC EXERCISES: CPT | Mod: HCNC,PO

## 2020-03-19 NOTE — PROGRESS NOTES
"  Occupational Therapy Daily Treatment Note     Date: 3/19/2020  Name: Manisha Wynne  Clinic Number: [id number]    Medical Diagnosis: left DRF with ORIF 1/24/20   Therapy Diagnosis:        Encounter Diagnoses   Name Primary?    Wrist pain, left      Decreased  strength of left hand      Range of motion deficit      Decreased pinch strength        Physician: Sarah Stapleton PA-C     Physician Orders: eval and treat      Surgical Procedure and Date: 1/24/20; fixation with plate and screws   Evaluation Date: 2/19/2020     Plan of Care Certification Period: 4/19/2020  Date of Return to MD: 3/6/81873     Visit # / Visits authorized: 5 / 12  Insurance Authorization Period Expiration: 8/4/2020  FOTO  (VISIT 2- 61%)      Time In:930 am   Time Out: 1015 am   Total Billable Time: 45 minutes     Precautions:  Standard , FALL RISK      Subjective     Pt reports: "Patient is reporting she is doing better since she was out of town for a few days."   Patient reported less discomfort after modifications were made to pre-bria wrist splint.   she was compliant with home exercise program given last session.   Response to previous treatment: more motion   Functional change: no change reported     Pain:  3-410  Location: left wrist       Objective     Manisha received the following supervised modalities after being cleared for contradictions for 10 minutes:   -Patient received paraffin bath to left hand(s) for 10 minutes to increase blood flow, circulation, pain management and for tissue elasticity prior to therex.      Manisha received therapeutic exercises for 20  minutes including:  -AROM for wrist flex, ext (with and without gravity assisted) , RD, UD, sup/pron x 10 reps each,  - added gentle prom for wrist flex/extension x 10 reps to increase joint mobility   - upgraded HEP to include wrist strengthening with 1# wrist flex, ext, RD, UD, x 10 reps   - hammer 1# for sup/pron x 10 reps   - red putty for gross  x 10 " reps, key, 2 and 3 pt pinch x 10 reps each   - provided theraputty and upgraded HEP   - reviewed on hold status x 2-4 weeks due to COVID-19 WITH Patient in agreement   - Reviewed modalities as needed for pain, swelling, stiffness   - remove brace at home; wear as needed for support only     Manisha received the following manual therapy techniques for 15 minutes:   -scar massage with mini vibrator and large massage stick to increase blood flow, circulation, tissue elasticity and to improve tensile glide   - Brief RM to decrease edema and to improve lymphatic drainage   - Brief PROM of digits and distraction/gentle mobilization of wrist to improve joint mobility /flexibility      / pinch assessed 3/19/2020 as follows:      R) 35 lbs. L) 20 lbs.   Key pinch R) 11 L) 9   3pt pinch R) 10  L) 8   2pt pinch R) 12  L) 8     Home Exercises and Education Provided     Education provided:   - red putty and upgraded wrist strengthening HEP   - Reviewed HEP  - Progress towards goals     Written Home Exercises Provided: Patient instructed to cont prior HEP.  Exercises were reviewed and Manisha was able to demonstrate them prior to the end of the session.  Manisha demonstrated good  understanding of the HEP provided.     See EMR under Patient Instructions for exercises provided prior visit. 3/19/20 for theraputty and wrist strengthening        Assessment       Manisha is progressing well towards her goals and there are no updates to goals at this time. Pt prognosis is Good.     Patient continues to make steady gains in increased AROM of L forearm/wrist, in all planes of joint mobility.   Initiated strengthening program this date for wrist strengthening and theraputty exercises.  Will place on hold x 2-4 weeks due to COVID-19 WITH patient reporting good understanding of HEP.  Pt will continue to benefit from skilled outpatient occupational therapy to address the deficits listed in the problem list on initial evaluation to  improve ROM, strength, pain management, /pinch strength, functional use, scar and edema management and to maximize pt's level of independence with ADLs in the home, work  and community environment.     Anticipated barriers to occupational therapy: None     Pt's spiritual, cultural and educational needs considered and pt agreeable to plan of care and goals.    The following goals were discussed with the patient and patient is in agreement with them as to be addressed in the treatment plan.       Goals:   Short Term Goals (4 weeks)   1)  Patient to be IND with HEP and modalities for pain management  2)  Increase ROM 3-9 degrees to increase functional hand use for ADLs/work/leisure activities- progressing   3)   Decrease edema .1-.3 mm to increase joint mobility /flexibility for functional hand use. - progressing   4)  Assess  and pinch and set goals accordingly   5)  Patient to score at __40-60%____  or less on FOTO to demonstrate improved perception of functional left hand  Use.        Long Term Goals (8 weeks)   1)  Increase  strength 2-8 lbs. For emptying litter box and taking out trash   2)  Increase pinch strength 1-4 psi's for typing and FM activity   3) Increase ROM 10-20 degrees to increase functional hand use for ADLs/work/leisure activities        Plan     Recommend continued Outpatient Occupataional Therapy  1x week for 8 weeks to include the following interventions Paraffin, Manual therapy/joint mobilizations, Modalities for pain management, US 3 mhz, Therapeutic exercises/activities., Strengthening, Edema Control, Scar Management, Wound Care and Electrical Modalities.     Within the Certification Period/Plan of care expiration: 2/19/2020 to 4/19/2020.        Desi Medellin, OT, CHT

## 2020-03-19 NOTE — PATIENT INSTRUCTIONS
[facility]  OCCUPATIONAL THERAPY  HOME EXERCISE PROGRAM     Complete the following strengthening exercises using 1-2 pound weight.  Do 10 repetitions of each, 1x per day.     Resisted Forearm Rotation  Use a weight or a hammer. Slowly rotate hand to one side then the other.      Resisted Wrist Flexion  With palm up and weight in hand, bend wrist up. Return slowly.      Resisted Wrist Extension  With palm down and weight in hand, bend wrist up. Then bend wrist down.      Resisted Wrist Deviation  With thumb up and weight in hand, bend wrist up. Return slowly.      Complete the following strengthening program 1x/day.      10 reps each 1-2 x daily   Hold 3 -5 seconds            2pt - thumb and index   3 pt - thumb index and middle finger     _   Key pinch - Biscuit - thumb and side of index      CHIOMA Reyes, MARY  Certified Hand Therapist  Occupational Therapist          Copyright © I. All rights reserved.

## 2020-03-26 ENCOUNTER — PATIENT MESSAGE (OUTPATIENT)
Dept: PSYCHIATRY | Facility: CLINIC | Age: 70
End: 2020-03-26

## 2020-03-28 ENCOUNTER — PATIENT MESSAGE (OUTPATIENT)
Dept: ADMINISTRATIVE | Facility: OTHER | Age: 70
End: 2020-03-28

## 2020-03-30 ENCOUNTER — OFFICE VISIT (OUTPATIENT)
Dept: PSYCHIATRY | Facility: CLINIC | Age: 70
End: 2020-03-30
Payer: MEDICARE

## 2020-03-30 DIAGNOSIS — F98.8 ATTENTION DEFICIT DISORDER (ADD) IN ADULT: ICD-10-CM

## 2020-03-30 DIAGNOSIS — F33.41 MAJOR DEPRESSIVE DISORDER, RECURRENT EPISODE, IN PARTIAL REMISSION: Primary | ICD-10-CM

## 2020-03-30 PROCEDURE — 90834 PR PSYCHOTHERAPY W/PATIENT, 45 MIN: ICD-10-PCS | Mod: 95,,, | Performed by: SOCIAL WORKER

## 2020-03-30 PROCEDURE — 90834 PSYTX W PT 45 MINUTES: CPT | Mod: 95,,, | Performed by: SOCIAL WORKER

## 2020-03-30 NOTE — PROGRESS NOTES
"The patient location is: [place]  The chief complaint leading to consultation is: depression  Visit type: Virtual visit with synchronous audio and video  Total time spent with patient: 45 minutes  Each patient to whom he or she provides medical services by telemedicine is:  (1) informed of the relationship between the physician and patient and the respective role of any other health care provider with respect to management of the patient; and (2) notified that he or she may decline to receive medical services by telemedicine and may withdraw from such care at any time.    Notes: Individual Psychotherapy (PhD/LCSW)    3/30/2020    Site: telemed    Therapeutic Intervention: Met with patient alone.  Outpatient - Insight oriented psychotherapy 45 min - CPT code 32932    Chief complaint/reason for encounter: depression, distractibility     Interval history and content of current session: Pt frustrated with herself, wakes up but stays in bed doing crossword puzzles and reading paper until noon, then feels she has lost half her day, can't get herself to straighten up her house, do dishes, etc. Discuss longstanding habit of avoidance, and suggest pt shift from shaming herself to getting angry at the "voice" that leads her astray. PT is intrigued and will try this approach. Pt connecting well with friends as she shelters in place, has everything she needs.    Treatment plan:  · Target symptoms: depression, distractibility  · Why chosen therapy is appropriate versus another modality: relevant to diagnosis  · Outcome monitoring methods: self-report  · Therapeutic intervention type: insight oriented psychotherapy    Risk parameters:  Patient reports no suicidal ideation  Patient reports no homicidal ideation  Patient reports no self-injurious behavior  Patient reports no violent behavior    Verbal deficits: None    Patient's response to intervention:  The patient's response to intervention is motivated    Progress toward " goals and other mental status changes:  The patient's progress toward goals is fair    Diagnosis: Major depression, recurrent, in partial remission,  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

## 2020-03-31 ENCOUNTER — DOCUMENTATION ONLY (OUTPATIENT)
Dept: CARDIOLOGY | Facility: CLINIC | Age: 70
End: 2020-03-31

## 2020-03-31 ENCOUNTER — PATIENT MESSAGE (OUTPATIENT)
Dept: PSYCHIATRY | Facility: CLINIC | Age: 70
End: 2020-03-31

## 2020-03-31 RX ORDER — FLUOXETINE HYDROCHLORIDE 40 MG/1
40 CAPSULE ORAL DAILY
Status: ON HOLD | COMMUNITY
Start: 2020-03-20 | End: 2022-12-13 | Stop reason: SDUPTHER

## 2020-04-01 ENCOUNTER — PATIENT MESSAGE (OUTPATIENT)
Dept: ADMINISTRATIVE | Facility: OTHER | Age: 70
End: 2020-04-01

## 2020-04-01 ENCOUNTER — DOCUMENTATION ONLY (OUTPATIENT)
Dept: CARDIOLOGY | Facility: CLINIC | Age: 70
End: 2020-04-01

## 2020-04-01 ENCOUNTER — PATIENT MESSAGE (OUTPATIENT)
Dept: CARDIOLOGY | Facility: CLINIC | Age: 70
End: 2020-04-01

## 2020-04-01 ENCOUNTER — OFFICE VISIT (OUTPATIENT)
Dept: CARDIOLOGY | Facility: CLINIC | Age: 70
End: 2020-04-01
Payer: MEDICARE

## 2020-04-01 DIAGNOSIS — I49.5 SSS (SICK SINUS SYNDROME): Chronic | ICD-10-CM

## 2020-04-01 DIAGNOSIS — Z95.0 CARDIAC PACEMAKER IN SITU: ICD-10-CM

## 2020-04-01 DIAGNOSIS — I10 ESSENTIAL HYPERTENSION: Primary | Chronic | ICD-10-CM

## 2020-04-01 DIAGNOSIS — G60.8 SENSORY PERIPHERAL NEUROPATHY: ICD-10-CM

## 2020-04-01 DIAGNOSIS — E78.2 MIXED HYPERLIPIDEMIA: Chronic | ICD-10-CM

## 2020-04-01 DIAGNOSIS — N18.30 CKD (CHRONIC KIDNEY DISEASE) STAGE 3, GFR 30-59 ML/MIN: ICD-10-CM

## 2020-04-01 DIAGNOSIS — I44.0 AV BLOCK, 1ST DEGREE: Chronic | ICD-10-CM

## 2020-04-01 DIAGNOSIS — I45.10 RBBB: ICD-10-CM

## 2020-04-01 PROCEDURE — 1101F PT FALLS ASSESS-DOCD LE1/YR: CPT | Mod: HCNC,CPTII,95, | Performed by: INTERNAL MEDICINE

## 2020-04-01 PROCEDURE — 1159F PR MEDICATION LIST DOCUMENTED IN MEDICAL RECORD: ICD-10-PCS | Mod: HCNC,95,, | Performed by: INTERNAL MEDICINE

## 2020-04-01 PROCEDURE — 99499 UNLISTED E&M SERVICE: CPT | Mod: HCNC,95,, | Performed by: INTERNAL MEDICINE

## 2020-04-01 PROCEDURE — 99499 RISK ADDL DX/OHS AUDIT: ICD-10-PCS | Mod: HCNC,95,, | Performed by: INTERNAL MEDICINE

## 2020-04-01 PROCEDURE — 1159F MED LIST DOCD IN RCRD: CPT | Mod: HCNC,95,, | Performed by: INTERNAL MEDICINE

## 2020-04-01 PROCEDURE — 99213 PR OFFICE/OUTPT VISIT, EST, LEVL III, 20-29 MIN: ICD-10-PCS | Mod: HCNC,95,, | Performed by: INTERNAL MEDICINE

## 2020-04-01 PROCEDURE — 1101F PR PT FALLS ASSESS DOC 0-1 FALLS W/OUT INJ PAST YR: ICD-10-PCS | Mod: HCNC,CPTII,95, | Performed by: INTERNAL MEDICINE

## 2020-04-01 PROCEDURE — 99213 OFFICE O/P EST LOW 20 MIN: CPT | Mod: HCNC,95,, | Performed by: INTERNAL MEDICINE

## 2020-04-01 NOTE — PROGRESS NOTES
Subjective:   Patient ID:  Manisha Wynne is a 69 y.o. female who presents for follow-up of hypertensive heart disease.    HPI: The patient location is: [place]  The chief complaint leading to consultation is: hypertension  Visit type: Virtual visit with synchronous audio and video  Total time spent with patient: 15-20 min  Each patient to whom he or she provides medical services by telemedicine is:  (1) informed of the relationship between the physician and patient and the respective role of any other health care provider with respect to management of the patient; and (2) notified that he or she may decline to receive medical services by telemedicine and may withdraw from such care at any time.    Notes: Patient has nor symptoms. Recently she had a wrist surgery .  Also is enrolled in sensory neuropathy research study in [place].    Restarted Digital medicine program\. Lat BP on 3/26 was 136/81, pulse 77 and weight 160    Lab Results   Component Value Date     12/13/2019    K 4.3 12/13/2019     12/13/2019    CO2 25 12/13/2019    BUN 22 12/13/2019    CREATININE 1.0 12/13/2019     12/13/2019    HGBA1C 5.2 11/29/2019    MG 2.5 09/07/2012    AST 51 (H) 11/29/2019    ALT 69 (H) 11/29/2019    ALBUMIN 4.1 11/29/2019    PROT 7.4 11/29/2019    BILITOT 0.8 11/29/2019    WBC 6.59 12/13/2019    HGB 11.8 (L) 12/13/2019    HCT 36.6 (L) 12/13/2019    MCV 92 12/13/2019     12/13/2019    INR 1.0 11/29/2019    TSH 3.076 11/29/2019    CHOL 215 (H) 11/29/2019    HDL 82 (H) 11/29/2019    LDLCALC 119.0 11/29/2019    TRIG 70 11/29/2019       Review of Systems   Constitution: Positive for malaise/fatigue.   HENT: Negative.    Eyes: Negative.    Cardiovascular: Negative.  Negative for chest pain, claudication, dyspnea on exertion, irregular heartbeat, leg swelling, near-syncope, palpitations and syncope.   Respiratory: Negative.  Negative for cough, shortness of breath, snoring and wheezing.     Endocrine: Negative.  Negative for cold intolerance, heat intolerance, polydipsia, polyphagia and polyuria.   Skin: Negative.    Musculoskeletal: Positive for arthritis.   Gastrointestinal: Negative.    Genitourinary: Negative.    Neurological: Positive for numbness and paresthesias.   Psychiatric/Behavioral: Positive for depression. The patient is nervous/anxious.        Objective:   Physical Exam      Assessment and Plan:     Problem List Items Addressed This Visit        Cardiology Problems    Hypertension - Primary (Chronic)    Hyperlipidemia (Chronic)    SSS (sick sinus syndrome) (Chronic)    AV block, 1st degree (Chronic)    RBBB       Other    Cardiac pacemaker in situ    CKD (chronic kidney disease) stage 3, GFR 30-59 ml/min    Sensory peripheral neuropathy          Patient's Medications   New Prescriptions    No medications on file   Previous Medications    ASPIRIN (ECOTRIN) 81 MG EC TABLET    Take 81 mg by mouth. 1 Tablet, Delayed Release (E.C.) Oral Every day    BUPROPION (WELLBUTRIN XL) 300 MG 24 HR TABLET    Take 300 mg by mouth once daily.     CALCIUM-VITAMIN D 600 MG(1,500MG) -200 UNIT TAB    Take 1 tablet by mouth once daily.     CHOLECALCIFEROL, VITAMIN D3, (VITAMIN D3) 5,000 UNIT TAB    Take 5,000 Units by mouth once daily.    FERROUS SULFATE (FEOSOL) 325 MG (65 MG IRON) TAB TABLET    Take 325 mg by mouth daily with breakfast. Taking two times daily    FLU VACC [id number],65YR UP,PF (FLUZONE HIGH-DOSE 2019-20, PF,) 180 MCG/0.5 ML SYRG    admin as directed    FLUOXETINE 40 MG CAPSULE    Take 40 mg by mouth once daily.    LISDEXAMFETAMINE (VYVANSE) 70 MG CAPSULE    Take 70 mg by mouth every morning.    LOSARTAN (COZAAR) 25 MG TABLET    TAKE 1 TABLET EVERY DAY    METOPROLOL TARTRATE (LOPRESSOR) 25 MG TABLET    TAKE 1 TABLET (25 MG TOTAL) BY MOUTH 2 (TWO) TIMES DAILY.    MULTIVITAMIN-MINERALS-LUTEIN (CENTRUM SILVER) TAB    Take by mouth. 1 Tablet Oral Every day    PRAVASTATIN (PRAVACHOL) 10 MG TABLET     TAKE 1 TABLET (10 MG TOTAL) BY MOUTH ONCE DAILY.   Modified Medications    No medications on file   Discontinued Medications    No medications on file     No change in the medical therapy.  Continue with digital program  Follow up in about 1 year (around 4/1/2021).

## 2020-04-07 ENCOUNTER — DOCUMENTATION ONLY (OUTPATIENT)
Dept: ELECTROPHYSIOLOGY | Facility: CLINIC | Age: 70
End: 2020-04-07

## 2020-04-08 ENCOUNTER — TELEPHONE (OUTPATIENT)
Dept: ORTHOPEDICS | Facility: CLINIC | Age: 70
End: 2020-04-08

## 2020-04-08 NOTE — TELEPHONE ENCOUNTER
Called pt regarding appt on 4/17 - need to be changed to virtual visit with xray the day before at [place]

## 2020-04-09 ENCOUNTER — TELEPHONE (OUTPATIENT)
Dept: ORTHOPEDICS | Facility: CLINIC | Age: 70
End: 2020-04-09

## 2020-04-13 ENCOUNTER — OFFICE VISIT (OUTPATIENT)
Dept: PSYCHIATRY | Facility: CLINIC | Age: 70
End: 2020-04-13
Payer: MEDICARE

## 2020-04-13 ENCOUNTER — TELEPHONE (OUTPATIENT)
Dept: INTERNAL MEDICINE | Facility: CLINIC | Age: 70
End: 2020-04-13

## 2020-04-13 DIAGNOSIS — F33.41 MAJOR DEPRESSIVE DISORDER, RECURRENT EPISODE, IN PARTIAL REMISSION: Primary | ICD-10-CM

## 2020-04-13 DIAGNOSIS — F98.8 ATTENTION DEFICIT DISORDER (ADD) IN ADULT: ICD-10-CM

## 2020-04-13 PROCEDURE — 90834 PR PSYCHOTHERAPY W/PATIENT, 45 MIN: ICD-10-PCS | Mod: HCNC,95,, | Performed by: SOCIAL WORKER

## 2020-04-13 PROCEDURE — 90834 PSYTX W PT 45 MINUTES: CPT | Mod: HCNC,95,, | Performed by: SOCIAL WORKER

## 2020-04-13 NOTE — PROGRESS NOTES
The patient location is: [place]  The chief complaint leading to consultation is: depression  Visit type: Virtual visit with synchronous audio and video  Total time spent with patient: 45 minutes  Each patient to whom he or she provides medical services by telemedicine is:  (1) informed of the relationship between the physician and patient and the respective role of any other health care provider with respect to management of the patient; and (2) notified that he or she may decline to receive medical services by telemedicine and may withdraw from such care at any time.    Notes: Individual Psychotherapy (PhD/LCSW)    4/13/2020    Site: telemed    Therapeutic Intervention: Met with patient alone.  Outpatient - Insight oriented psychotherapy 45 min - CPT code 72562    Chief complaint/reason for encounter: depression, distractibility     Interval history and content of current session: Pt continues to stay in bed until afternoon most days. However she has gotten a lot done in terms of unpacking boxes and cleaning things in her house. Discuss lack of parental guidance in childhood and lack of ability to plan and follow through. Pt would like to try staying up if she wakes up to use bathroom in a.m. And moving meds to kitchen so she has to get up to take them. Discuss plan to look at calendar the night before since days run together when she is sheltering in place. Pt is reaching out to friends and had long talk with a cousin. Mood is good.    Treatment plan:  · Target symptoms: depression, distractibility  · Why chosen therapy is appropriate versus another modality: relevant to diagnosis  · Outcome monitoring methods: self-report  · Therapeutic intervention type: insight oriented psychotherapy    Risk parameters:  Patient reports no suicidal ideation  Patient reports no homicidal ideation  Patient reports no self-injurious behavior  Patient reports no violent behavior    Verbal deficits: None    Patient's response to  intervention:  The patient's response to intervention is motivated    Progress toward goals and other mental status changes:  The patient's progress toward goals is fair    Diagnosis: Major depression, recurrent, in partial remission,  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

## 2020-04-13 NOTE — TELEPHONE ENCOUNTER
----- Message from Lucy Clay sent at 4/13/2020 11:53 AM CDT -----  Contact: [email or web address]/[phone]  Rep called in regards to checking to see if the office receive information in regards to a bone fracture or bone density scan. It was faxed over on 03-25-20. They would like a call back.     Please advise

## 2020-04-19 ENCOUNTER — PATIENT MESSAGE (OUTPATIENT)
Dept: PSYCHIATRY | Facility: CLINIC | Age: 70
End: 2020-04-19

## 2020-04-23 ENCOUNTER — PATIENT MESSAGE (OUTPATIENT)
Dept: ADMINISTRATIVE | Facility: OTHER | Age: 70
End: 2020-04-23

## 2020-04-30 ENCOUNTER — DOCUMENTATION ONLY (OUTPATIENT)
Dept: REHABILITATION | Facility: HOSPITAL | Age: 70
End: 2020-04-30

## 2020-04-30 NOTE — PROGRESS NOTES
4/30/2020     Left a message requesting return phone from patient to check on status, answer questions, and provide HEP upgrade if needed  due to therapy postponement due to COVID-19.  Also informed patient that clinics are planning to reopen on 5/18/20.      CHIOMA Graica, AILEENT

## 2020-05-04 ENCOUNTER — OFFICE VISIT (OUTPATIENT)
Dept: PSYCHIATRY | Facility: CLINIC | Age: 70
End: 2020-05-04
Payer: MEDICARE

## 2020-05-04 DIAGNOSIS — F98.8 ATTENTION DEFICIT DISORDER (ADD) IN ADULT: ICD-10-CM

## 2020-05-04 DIAGNOSIS — F33.41 MAJOR DEPRESSIVE DISORDER, RECURRENT EPISODE, IN PARTIAL REMISSION: Primary | ICD-10-CM

## 2020-05-04 PROCEDURE — 90834 PR PSYCHOTHERAPY W/PATIENT, 45 MIN: ICD-10-PCS | Mod: HCNC,95,, | Performed by: SOCIAL WORKER

## 2020-05-04 PROCEDURE — 90834 PSYTX W PT 45 MINUTES: CPT | Mod: HCNC,95,, | Performed by: SOCIAL WORKER

## 2020-05-11 ENCOUNTER — PATIENT MESSAGE (OUTPATIENT)
Dept: INTERNAL MEDICINE | Facility: CLINIC | Age: 70
End: 2020-05-11

## 2020-05-12 ENCOUNTER — PATIENT OUTREACH (OUTPATIENT)
Dept: OTHER | Facility: OTHER | Age: 70
End: 2020-05-12

## 2020-05-12 PROCEDURE — 99453 REM MNTR PHYSIOL PARAM SETUP: CPT | Mod: S$GLB,,, | Performed by: INTERNAL MEDICINE

## 2020-05-12 PROCEDURE — 99453 PR REMOTE MONITR, PHYSIOL PARAM, INITIAL: ICD-10-PCS | Mod: S$GLB,,, | Performed by: INTERNAL MEDICINE

## 2020-05-12 NOTE — PROGRESS NOTES
Digital Medicine: Health  Introduction    Introduced Manisha Wynne to Digital Medicine. Discussed health  role and recommended lifestyle modifications.    HPI    Last 5 Patient Entered Readings                                      Current 30 Day Average: 125/76     Recent Readings 5/11/2020 5/11/2020 5/10/2020 5/10/2020 5/9/2020    SBP (mmHg) 142 142 101 101 132    DBP (mmHg) 87 87 66 66 74    Pulse 64 64 64 64 71            Intervention/Plan    There are no preventive care reminders to display for this patient.    Reviewed the importance of self-monitoring, medication adherence, and that the health  can be used as a resource for lifestyle modifications to help reduce or maintain a healthy lifestyle.    Sent link to [facility] Digital [facility] webpages and my contact information via [employer] for future questions. Follow up scheduled.         Screenings    SDOH

## 2020-05-12 NOTE — PROGRESS NOTES
Digital Medicine: Health  Introduction    Introduced Manisha Wynne to Digital Medicine. Discussed health  role and recommended lifestyle modifications.    Newly enrolled pt to [facility] Digital Medicine program.     Mrs. Manisha Wynne, feels well without complaints. She takes her medications daily at noon. She is enrolled in sensory neuropathy research study in [place] after having four falls in the past 4 years. She is recovering from her most recent fall 6 weeks ago that resulted in a broken wrist. Pt states she is aware of the health benefits of daily exercise so she would like to start walking. In regards to dietary habits, pt is familiar with reading nutrition labels and avoids salting her food to limit her sodium intake. She gets 2 meals per week from [employer] but noticed that their meals were high in sodium.Discussed alternative meal prep companies like [employer] and [employer] that offer low sodium meal options.    Mrs. Wynne , does not have question or concerns at this time.      The history is provided by the patient.     HYPERTENSION  Our goal is to get BP to consistently below 130/80mmHg and make the process convenient so patient can avoid extra trips to the office. Getting your blood pressure below 130/80mmHg (definition of control) will reduce your risk for heart attack, kidney failure, stroke and death (as well as kidney failure, eye disease, & dementia)      Reviewed that the Digital Medicine care team - consisting of a clinician and a health  - will follow the most current evidence-based national guidelines for treating your condition.  The health  will focus on lifestyle modifications and motivation while the clinician will focus on medication therapy.  The care team will review all data on a regular basis and reach out as needed.      Explained that one of the key parts of the program is communication with the care team.  Asked patient to respond to  outreach attempts and complete questionnaires.  Stressed importance of medication adherence.    Reviewed non-pharmacologic therapies and impact on BP.      Explained that we expect patient to obtain several blood pressures per week at random times of day.  Instructed patient not to allow anyone else to use phone and monitoring device.  Confirmed appropriate BP monitoring technique.      Explained to patient that the digital medicine team is not available for emergencies.  Patient will call [facility] on-call ([phone] or [phone]) or 911 if needed.      Patient's BP goal is 130/80.Patient's BP average is 125/76 mmHg, which is above goal, per 2017 ACC/AHA Hypertension Guidelines.          Last 5 Patient Entered Readings                                      Current 30 Day Average: 125/76     Recent Readings 5/11/2020 5/11/2020 5/10/2020 5/10/2020 5/9/2020    SBP (mmHg) 142 142 101 101 132    DBP (mmHg) 87 87 66 66 74    Pulse 64 64 64 64 71            INTERVENTION(S)  recommended diet modifications, reviewed monitoring technique and encouragement/support    PLAN  continue monitoring      There are no preventive care reminders to display for this patient.    Reviewed the importance of self-monitoring, medication adherence, and that the health  can be used as a resource for lifestyle modifications to help reduce or maintain a healthy lifestyle.    Sent link to [facility] "[facility] Medicine webpages and my contact information via [employer] for future questions. Follow up scheduled.             Diet Screening   She has the following dietary restrictions: low sodium diet    The patient states that she typically eats a non-home cooked meal (ex: dining out, take out, frozen meals) 0 times per week.  She cooks for self.    Patient does the shopping for groceries.  She gets groceries from the grocery store.      Barriers to a Healthy Diet: no barriers to healthy eating    Discussed healthy eating choices on adding more  fruits and vegetables, whole grains, and  lean proteins and limiting packaged, fast foods, processed meats, sugary snacks/beverages.  Suggestions from AHA given to pt to maintain salt intake below 2,000mg per day. Recommended  to become familiar with reading nutrition labels and switch out salty seasonings for salt-free ones, like Mrs. Yu.     Physical Activity Screening   When asked if exercising, patient responded: no  Patient has limited mobility: new injury and recovering from surgery/rehab    She identified the following barriers to physical activity: pain/injury/recent surgery    Intervention(s): exercise ideas     Assigning the following patient goal(s): increase physical activity and 150 minutes of exercise per week      SDOH

## 2020-05-18 ENCOUNTER — OFFICE VISIT (OUTPATIENT)
Dept: PSYCHIATRY | Facility: CLINIC | Age: 70
End: 2020-05-18
Payer: MEDICARE

## 2020-05-18 ENCOUNTER — TELEPHONE (OUTPATIENT)
Dept: CARDIOLOGY | Facility: CLINIC | Age: 70
End: 2020-05-18

## 2020-05-18 ENCOUNTER — LAB VISIT (OUTPATIENT)
Dept: LAB | Facility: HOSPITAL | Age: 70
End: 2020-05-18
Attending: INTERNAL MEDICINE
Payer: MEDICARE

## 2020-05-18 DIAGNOSIS — E78.2 MIXED HYPERLIPIDEMIA: ICD-10-CM

## 2020-05-18 DIAGNOSIS — E78.2 MIXED HYPERLIPIDEMIA: Primary | ICD-10-CM

## 2020-05-18 DIAGNOSIS — N18.30 CKD (CHRONIC KIDNEY DISEASE) STAGE 3, GFR 30-59 ML/MIN: ICD-10-CM

## 2020-05-18 DIAGNOSIS — F33.41 MAJOR DEPRESSIVE DISORDER, RECURRENT EPISODE, IN PARTIAL REMISSION: Primary | ICD-10-CM

## 2020-05-18 DIAGNOSIS — I10 ESSENTIAL HYPERTENSION: ICD-10-CM

## 2020-05-18 DIAGNOSIS — F98.8 ATTENTION DEFICIT DISORDER (ADD) IN ADULT: ICD-10-CM

## 2020-05-18 LAB
ALBUMIN SERPL BCP-MCNC: 3.9 G/DL (ref 3.5–5.2)
ALP SERPL-CCNC: 67 U/L (ref 55–135)
ALT SERPL W/O P-5'-P-CCNC: 27 U/L (ref 10–44)
ANION GAP SERPL CALC-SCNC: 8 MMOL/L (ref 8–16)
AST SERPL-CCNC: 27 U/L (ref 10–40)
BILIRUB SERPL-MCNC: 0.5 MG/DL (ref 0.1–1)
BUN SERPL-MCNC: 24 MG/DL (ref 8–23)
CALCIUM SERPL-MCNC: 10.5 MG/DL (ref 8.7–10.5)
CHLORIDE SERPL-SCNC: 104 MMOL/L (ref 95–110)
CHOLEST SERPL-MCNC: 212 MG/DL (ref 120–199)
CHOLEST/HDLC SERPL: 3.1 {RATIO} (ref 2–5)
CO2 SERPL-SCNC: 27 MMOL/L (ref 23–29)
CREAT SERPL-MCNC: 1.1 MG/DL (ref 0.5–1.4)
EST. GFR  (AFRICAN AMERICAN): 58.4 ML/MIN/1.73 M^2
EST. GFR  (NON AFRICAN AMERICAN): 50.6 ML/MIN/1.73 M^2
GLUCOSE SERPL-MCNC: 102 MG/DL (ref 70–110)
HDLC SERPL-MCNC: 69 MG/DL (ref 40–75)
HDLC SERPL: 32.5 % (ref 20–50)
LDLC SERPL CALC-MCNC: 119 MG/DL (ref 63–159)
NONHDLC SERPL-MCNC: 143 MG/DL
POTASSIUM SERPL-SCNC: 4.6 MMOL/L (ref 3.5–5.1)
PROT SERPL-MCNC: 7.2 G/DL (ref 6–8.4)
SODIUM SERPL-SCNC: 139 MMOL/L (ref 136–145)
TRIGL SERPL-MCNC: 120 MG/DL (ref 30–150)
TSH SERPL DL<=0.005 MIU/L-ACNC: 3.6 UIU/ML (ref 0.4–4)

## 2020-05-18 PROCEDURE — 90834 PR PSYCHOTHERAPY W/PATIENT, 45 MIN: ICD-10-PCS | Mod: HCNC,95,, | Performed by: SOCIAL WORKER

## 2020-05-18 PROCEDURE — 90834 PSYTX W PT 45 MINUTES: CPT | Mod: HCNC,95,, | Performed by: SOCIAL WORKER

## 2020-05-18 PROCEDURE — 80053 COMPREHEN METABOLIC PANEL: CPT | Mod: HCNC

## 2020-05-18 PROCEDURE — 36415 COLL VENOUS BLD VENIPUNCTURE: CPT | Mod: HCNC,PO

## 2020-05-18 PROCEDURE — 80061 LIPID PANEL: CPT | Mod: HCNC

## 2020-05-18 PROCEDURE — 84443 ASSAY THYROID STIM HORMONE: CPT | Mod: HCNC

## 2020-05-18 NOTE — TELEPHONE ENCOUNTER
Pt notified, verbalized understanding. Pt stated that she wants to try diet and exercise and have labs drawn in 2 months. Pt stated that she does not want to increase Pravastatin. Please advise.

## 2020-05-18 NOTE — PROGRESS NOTES
The patient location is: [place]  The chief complaint leading to consultation is: depression  Visit type: Virtual visit with synchronous audio and video  Total time spent with patient: 45 minutes  Each patient to whom he or she provides medical services by telemedicine is:  (1) informed of the relationship between the physician and patient and the respective role of any other health care provider with respect to management of the patient; and (2) notified that he or she may decline to receive medical services by telemedicine and may withdraw from such care at any time.    Notes: Individual Psychotherapy (PhD/LCSW)    5/18/2020    Site: telemed    Therapeutic Intervention: Met with patient alone.  Outpatient - Insight oriented psychotherapy 45 min - CPT code 65851    Chief complaint/reason for encounter: depression, distractibility     Interval history and content of current session: Pt accomplished little this week but had a good day today, attended a virtual meeting with chantal colleagues, had a lab appointment, went to [employer] and bought some herbs, cleaned her kitchen floor. Discuss use of talk radio and TV that pull her in and pull her off task, ask pt not to start her morning with radio and to be mindful about what she chooses to look at or listen to.  Pt has goal of cleaning up house by July when friend is coming to town. Suggest she focus on short-term goals, discuss strategies for next steps.    Treatment plan:  · Target symptoms: depression, distractibility  · Why chosen therapy is appropriate versus another modality: relevant to diagnosis  · Outcome monitoring methods: self-report  · Therapeutic intervention type: insight oriented psychotherapy    Risk parameters:  Patient reports no suicidal ideation  Patient reports no homicidal ideation  Patient reports no self-injurious behavior  Patient reports no violent behavior    Verbal deficits: None    Patient's response to intervention:  The  patient's response to intervention is motivated    Progress toward goals and other mental status changes:  The patient's progress toward goals is fair    Diagnosis: Major depression, recurrent, in partial remission,  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

## 2020-05-18 NOTE — TELEPHONE ENCOUNTER
----- Message from Natali Comer MD sent at 5/18/2020  1:31 PM CDT -----  Please inform the patient that blood work looks ine. LDL-C is still over 100.  If patient could tolerate increased Pravastatin I would increase it to 20 mg Qhs and check lipids a nd LFT's in 2 months.

## 2020-05-21 ENCOUNTER — OFFICE VISIT (OUTPATIENT)
Dept: INTERNAL MEDICINE | Facility: CLINIC | Age: 70
End: 2020-05-21
Payer: MEDICARE

## 2020-05-21 VITALS
WEIGHT: 157.44 LBS | DIASTOLIC BLOOD PRESSURE: 64 MMHG | SYSTOLIC BLOOD PRESSURE: 106 MMHG | HEART RATE: 66 BPM | HEIGHT: 68 IN | OXYGEN SATURATION: 97 % | BODY MASS INDEX: 23.86 KG/M2

## 2020-05-21 DIAGNOSIS — E21.0 PRIMARY HYPERPARATHYROIDISM: Primary | ICD-10-CM

## 2020-05-21 DIAGNOSIS — L60.8 TOENAIL DEFORMITY: ICD-10-CM

## 2020-05-21 DIAGNOSIS — R29.6 MULTIPLE FALLS: ICD-10-CM

## 2020-05-21 DIAGNOSIS — R32 URINARY INCONTINENCE, UNSPECIFIED TYPE: ICD-10-CM

## 2020-05-21 PROCEDURE — 1126F PR PAIN SEVERITY QUANTIFIED, NO PAIN PRESENT: ICD-10-PCS | Mod: HCNC,S$GLB,, | Performed by: INTERNAL MEDICINE

## 2020-05-21 PROCEDURE — 3074F SYST BP LT 130 MM HG: CPT | Mod: HCNC,CPTII,S$GLB, | Performed by: INTERNAL MEDICINE

## 2020-05-21 PROCEDURE — 99499 RISK ADDL DX/OHS AUDIT: ICD-10-PCS | Mod: HCNC,S$GLB,, | Performed by: INTERNAL MEDICINE

## 2020-05-21 PROCEDURE — 99999 PR PBB SHADOW E&M-EST. PATIENT-LVL V: CPT | Mod: PBBFAC,HCNC,, | Performed by: INTERNAL MEDICINE

## 2020-05-21 PROCEDURE — 1159F MED LIST DOCD IN RCRD: CPT | Mod: HCNC,S$GLB,, | Performed by: INTERNAL MEDICINE

## 2020-05-21 PROCEDURE — 3078F PR MOST RECENT DIASTOLIC BLOOD PRESSURE < 80 MM HG: ICD-10-PCS | Mod: HCNC,CPTII,S$GLB, | Performed by: INTERNAL MEDICINE

## 2020-05-21 PROCEDURE — 99499 UNLISTED E&M SERVICE: CPT | Mod: HCNC,S$GLB,, | Performed by: INTERNAL MEDICINE

## 2020-05-21 PROCEDURE — 3078F DIAST BP <80 MM HG: CPT | Mod: HCNC,CPTII,S$GLB, | Performed by: INTERNAL MEDICINE

## 2020-05-21 PROCEDURE — 1126F AMNT PAIN NOTED NONE PRSNT: CPT | Mod: HCNC,S$GLB,, | Performed by: INTERNAL MEDICINE

## 2020-05-21 PROCEDURE — 99214 OFFICE O/P EST MOD 30 MIN: CPT | Mod: HCNC,S$GLB,, | Performed by: INTERNAL MEDICINE

## 2020-05-21 PROCEDURE — 99999 PR PBB SHADOW E&M-EST. PATIENT-LVL V: ICD-10-PCS | Mod: PBBFAC,HCNC,, | Performed by: INTERNAL MEDICINE

## 2020-05-21 PROCEDURE — 3074F PR MOST RECENT SYSTOLIC BLOOD PRESSURE < 130 MM HG: ICD-10-PCS | Mod: HCNC,CPTII,S$GLB, | Performed by: INTERNAL MEDICINE

## 2020-05-21 PROCEDURE — 1159F PR MEDICATION LIST DOCUMENTED IN MEDICAL RECORD: ICD-10-PCS | Mod: HCNC,S$GLB,, | Performed by: INTERNAL MEDICINE

## 2020-05-21 PROCEDURE — 81001 URINALYSIS AUTO W/SCOPE: CPT | Mod: HCNC

## 2020-05-21 PROCEDURE — 87086 URINE CULTURE/COLONY COUNT: CPT | Mod: HCNC

## 2020-05-21 PROCEDURE — 1101F PR PT FALLS ASSESS DOC 0-1 FALLS W/OUT INJ PAST YR: ICD-10-PCS | Mod: HCNC,CPTII,S$GLB, | Performed by: INTERNAL MEDICINE

## 2020-05-21 PROCEDURE — 99214 PR OFFICE/OUTPT VISIT, EST, LEVL IV, 30-39 MIN: ICD-10-PCS | Mod: HCNC,S$GLB,, | Performed by: INTERNAL MEDICINE

## 2020-05-21 PROCEDURE — 1101F PT FALLS ASSESS-DOCD LE1/YR: CPT | Mod: HCNC,CPTII,S$GLB, | Performed by: INTERNAL MEDICINE

## 2020-05-22 LAB
BACTERIA #/AREA URNS AUTO: ABNORMAL /HPF
BILIRUB UR QL STRIP: NEGATIVE
CLARITY UR REFRACT.AUTO: ABNORMAL
COLOR UR AUTO: YELLOW
GLUCOSE UR QL STRIP: NEGATIVE
HGB UR QL STRIP: NEGATIVE
HYALINE CASTS UR QL AUTO: 8 /LPF
KETONES UR QL STRIP: NEGATIVE
LEUKOCYTE ESTERASE UR QL STRIP: ABNORMAL
MICROSCOPIC COMMENT: ABNORMAL
NITRITE UR QL STRIP: NEGATIVE
PH UR STRIP: 5 [PH] (ref 5–8)
PROT UR QL STRIP: NEGATIVE
RBC #/AREA URNS AUTO: 2 /HPF (ref 0–4)
SP GR UR STRIP: 1.02 (ref 1–1.03)
SQUAMOUS #/AREA URNS AUTO: 2 /HPF
URN SPEC COLLECT METH UR: ABNORMAL
WBC #/AREA URNS AUTO: 4 /HPF (ref 0–5)

## 2020-05-23 LAB — BACTERIA UR CULT: NORMAL

## 2020-05-26 ENCOUNTER — LAB VISIT (OUTPATIENT)
Dept: LAB | Facility: HOSPITAL | Age: 70
End: 2020-05-26
Attending: INTERNAL MEDICINE
Payer: MEDICARE

## 2020-05-26 DIAGNOSIS — E21.0 PRIMARY HYPERPARATHYROIDISM: ICD-10-CM

## 2020-05-26 LAB
ANION GAP SERPL CALC-SCNC: 6 MMOL/L (ref 8–16)
BUN SERPL-MCNC: 15 MG/DL (ref 8–23)
CALCIUM SERPL-MCNC: 9.5 MG/DL (ref 8.7–10.5)
CHLORIDE SERPL-SCNC: 104 MMOL/L (ref 95–110)
CO2 SERPL-SCNC: 26 MMOL/L (ref 23–29)
CREAT SERPL-MCNC: 1.1 MG/DL (ref 0.5–1.4)
EST. GFR  (AFRICAN AMERICAN): 58.4 ML/MIN/1.73 M^2
EST. GFR  (NON AFRICAN AMERICAN): 50.6 ML/MIN/1.73 M^2
GLUCOSE SERPL-MCNC: 77 MG/DL (ref 70–110)
POTASSIUM SERPL-SCNC: 4.4 MMOL/L (ref 3.5–5.1)
PTH-INTACT SERPL-MCNC: 111 PG/ML (ref 9–77)
SODIUM SERPL-SCNC: 136 MMOL/L (ref 136–145)

## 2020-05-26 PROCEDURE — 80048 BASIC METABOLIC PNL TOTAL CA: CPT | Mod: HCNC

## 2020-05-26 PROCEDURE — 36415 COLL VENOUS BLD VENIPUNCTURE: CPT | Mod: HCNC,PO

## 2020-05-26 PROCEDURE — 83970 ASSAY OF PARATHORMONE: CPT | Mod: HCNC

## 2020-05-27 ENCOUNTER — PATIENT MESSAGE (OUTPATIENT)
Dept: INTERNAL MEDICINE | Facility: CLINIC | Age: 70
End: 2020-05-27

## 2020-05-27 ENCOUNTER — INITIAL CONSULT (OUTPATIENT)
Dept: UROGYNECOLOGY | Facility: CLINIC | Age: 70
End: 2020-05-27
Payer: MEDICARE

## 2020-05-27 VITALS
DIASTOLIC BLOOD PRESSURE: 80 MMHG | SYSTOLIC BLOOD PRESSURE: 122 MMHG | WEIGHT: 158.5 LBS | HEIGHT: 68 IN | BODY MASS INDEX: 24.02 KG/M2

## 2020-05-27 DIAGNOSIS — R32 URINARY INCONTINENCE, UNSPECIFIED TYPE: ICD-10-CM

## 2020-05-27 DIAGNOSIS — N95.2 VAGINAL ATROPHY: Primary | ICD-10-CM

## 2020-05-27 PROCEDURE — 99999 PR PBB SHADOW E&M-EST. PATIENT-LVL V: ICD-10-PCS | Mod: PBBFAC,HCNC,, | Performed by: PHYSICIAN ASSISTANT

## 2020-05-27 PROCEDURE — 3079F DIAST BP 80-89 MM HG: CPT | Mod: HCNC,CPTII,S$GLB, | Performed by: PHYSICIAN ASSISTANT

## 2020-05-27 PROCEDURE — 3074F PR MOST RECENT SYSTOLIC BLOOD PRESSURE < 130 MM HG: ICD-10-PCS | Mod: HCNC,CPTII,S$GLB, | Performed by: PHYSICIAN ASSISTANT

## 2020-05-27 PROCEDURE — 99214 PR OFFICE/OUTPT VISIT, EST, LEVL IV, 30-39 MIN: ICD-10-PCS | Mod: HCNC,S$GLB,, | Performed by: PHYSICIAN ASSISTANT

## 2020-05-27 PROCEDURE — 99214 OFFICE O/P EST MOD 30 MIN: CPT | Mod: HCNC,S$GLB,, | Performed by: PHYSICIAN ASSISTANT

## 2020-05-27 PROCEDURE — 1126F AMNT PAIN NOTED NONE PRSNT: CPT | Mod: HCNC,S$GLB,, | Performed by: PHYSICIAN ASSISTANT

## 2020-05-27 PROCEDURE — 3079F PR MOST RECENT DIASTOLIC BLOOD PRESSURE 80-89 MM HG: ICD-10-PCS | Mod: HCNC,CPTII,S$GLB, | Performed by: PHYSICIAN ASSISTANT

## 2020-05-27 PROCEDURE — 1126F PR PAIN SEVERITY QUANTIFIED, NO PAIN PRESENT: ICD-10-PCS | Mod: HCNC,S$GLB,, | Performed by: PHYSICIAN ASSISTANT

## 2020-05-27 PROCEDURE — 99999 PR PBB SHADOW E&M-EST. PATIENT-LVL V: CPT | Mod: PBBFAC,HCNC,, | Performed by: PHYSICIAN ASSISTANT

## 2020-05-27 PROCEDURE — 1159F MED LIST DOCD IN RCRD: CPT | Mod: HCNC,S$GLB,, | Performed by: PHYSICIAN ASSISTANT

## 2020-05-27 PROCEDURE — 1101F PT FALLS ASSESS-DOCD LE1/YR: CPT | Mod: HCNC,CPTII,S$GLB, | Performed by: PHYSICIAN ASSISTANT

## 2020-05-27 PROCEDURE — 3074F SYST BP LT 130 MM HG: CPT | Mod: HCNC,CPTII,S$GLB, | Performed by: PHYSICIAN ASSISTANT

## 2020-05-27 PROCEDURE — 1159F PR MEDICATION LIST DOCUMENTED IN MEDICAL RECORD: ICD-10-PCS | Mod: HCNC,S$GLB,, | Performed by: PHYSICIAN ASSISTANT

## 2020-05-27 PROCEDURE — 1101F PR PT FALLS ASSESS DOC 0-1 FALLS W/OUT INJ PAST YR: ICD-10-PCS | Mod: HCNC,CPTII,S$GLB, | Performed by: PHYSICIAN ASSISTANT

## 2020-05-27 RX ORDER — ESTRADIOL 0.1 MG/G
1 CREAM VAGINAL
Qty: 1 TUBE | Refills: 1 | Status: SHIPPED | OUTPATIENT
Start: 2020-05-28 | End: 2020-11-17

## 2020-05-27 NOTE — PROGRESS NOTES
[facility] [id number]   [address] [address]    Manisha Wynne  [id number]  1950    Consulting Physician: Iris Blue MD   GYN: none  PCP: Iris Blue MD    CC:   Chief Complaint   Patient presents with    Urinary Incontinence     HPI: 68 y/o F  with HTN, depression, CEDRIC, hyperlipidemia, presents for urinary incontinence.     1)  UI:  (--) BRIAN (occasional) <  (+) UUI  X 2years. Usually worst in the morning. Goes to bed around 11, gets up around 1 to urinate, then has to urinate first thing in the am or she will leak when she gets out of bed. (+) pads 0- 1/day, only wears one when she travels, usually minimum wetness and 0/night usually minimum wetness.  Daytime frequency: Q 2 hours.  Nocturia: Yes: 2/night.   (--) dysuria,  (--) hematuria,  (--) frequent UTIs.  (+) incomplete bladder emptying. Will sit on toilet and wait for another urge.     2)  POP:  Absent. above introitus.  Symptoms:(--).  (--) vaginal bleeding. (--) vaginal discharge. (--) sexually active.  (--) dyspareunia.  (--)  Vaginal dryness.  (--) vaginal estrogen use.     3)  BM:  (--) constipation/straining.  (--) chronic diarrhea. (--) hematochezia.  (--) fecal incontinence.  (--) fecal smearing/urgency.  (--) incomplete evacuation.    Past Medical History  Past Medical History:   Diagnosis Date    Abnormal Pap smear     Atrial fibrillation     AV block, 1st degree 2012    Cataract     Depression 2012    Facet arthritis of lumbar region 3/31/2015    Falls     3 falls in the last 6 mos--noted 19    Hyperlipidemia     Hypertension 2012    Neuropathy     Other specified cardiac dysrhythmias(427.89)     Sleep apnea     Syncope 2012        Past Surgical History  Past Surgical History:   Procedure Laterality Date    APPLICATION OF CARTILAGE GRAFT Bilateral 2019    Procedure: APPLICATION, CARTILAGE GRAFT AURICULAR JEZ;   Surgeon: Martinez Flores III, MD;  Location: [facility];  Service: ENT;  Laterality: Bilateral;    CARDIAC PACEMAKER PLACEMENT  2012    [id number]    DILATION AND CURETTAGE OF UTERUS      Hx of precancerous cells?    ENDOSCOPIC ULTRASOUND OF UPPER GASTROINTESTINAL TRACT N/A 2019    Procedure: ULTRASOUND, UPPER GI TRACT, ENDOSCOPIC;  Surgeon: Kev Calderon MD;  Location: [facility] ENDO (2ND FLR);  Service: Endoscopy;  Laterality: N/A;  Pacemaker-Adam   appt confirmed-rb    NASAL SEPTOPLASTY N/A 2019    Procedure: SEPTOPLASTY, NOSE;  Surgeon: Martinez Flores III, MD;  Location: [facility] OR;  Service: ENT;  Laterality: N/A;  FOLLOW DR SALVATORE KIMBROUGH PROTOCOL    OPEN REDUCTION AND INTERNAL FIXATION (ORIF) OF FRACTURE OF DISTAL RADIUS Left 2020    Procedure: ORIF, FRACTURE, RADIUS, DISTAL-left;  Surgeon: Ellen Moe MD;  Location: [facility];  Service: Orthopedics;  Laterality: Left;    SURGICAL REMOVAL OF NASAL TURBINATE Bilateral 2019    Procedure: EXCISION, NASAL TURBINATE;  Surgeon: Martinez Flores III, MD;  Location: [facility];  Service: ENT;  Laterality: Bilateral;    TONSILLECTOMY      WISDOM TOOTH EXTRACTION        Hysterectomy: No  Cervix present: Yes   Ovaries present: Yes     Family History  Family History   Adopted: Yes   Problem Relation Age of Onset    Amblyopia Neg Hx     Blindness Neg Hx     Cataracts Neg Hx     Glaucoma Neg Hx     Macular degeneration Neg Hx     Retinal detachment Neg Hx       Social History  Social History     Tobacco Use   Smoking Status Former Smoker    Packs/day: 0.25    Years: 15.00    Pack years: 3.75    Last attempt to quit: 1982    Years since quittin.8   Smokeless Tobacco Never Used   .  Cessation date: .  PPD:  .25 x 15 years.   Social History     Substance and Sexual Activity   Alcohol Use Yes    Frequency: 2-4 times a month    Drinks per session: 1 or 2    Binge frequency: Never    Comment: occ   .     Social History     Substance and Sexual Activity   Drug Use No   .  The patient is single.  Resides in [place] [address].  Employment status: currently employed as a  at a hospice    Allergies  Review of patient's allergies indicates:  No Known Allergies    Medications  Current Outpatient Medications on File Prior to Visit   Medication Sig Dispense Refill    aspirin (ECOTRIN) 81 MG EC tablet Take 81 mg by mouth. 1 Tablet, Delayed Release (E.C.) Oral Every day      buPROPion (WELLBUTRIN XL) 300 MG 24 hr tablet Take 300 mg by mouth once daily.       calcium-vitamin D 600 mg(1,500mg) -200 unit Tab Take 1 tablet by mouth once daily.       cholecalciferol, vitamin D3, (VITAMIN D3) 5,000 unit Tab Take 5,000 Units by mouth once daily.      ferrous sulfate (FEOSOL) 325 mg (65 mg iron) Tab tablet Take 325 mg by mouth daily with breakfast. Taking two times daily      flu vacc [id number],65yr up,PF (FLUZONE HIGH-DOSE , PF,) 180 mcg/0.5 mL Syrg admin as directed 0.5 mL 0    FLUoxetine 40 MG capsule Take 40 mg by mouth once daily.      lisdexamfetamine (VYVANSE) 70 MG capsule Take 70 mg by mouth every morning.      losartan (COZAAR) 25 MG tablet TAKE 1 TABLET EVERY DAY 90 tablet 6    metoprolol tartrate (LOPRESSOR) 25 MG tablet TAKE 1 TABLET (25 MG TOTAL) BY MOUTH 2 (TWO) TIMES DAILY. 180 tablet 3    multivitamin-minerals-lutein (CENTRUM SILVER) Tab Take by mouth. 1 Tablet Oral Every day      pravastatin (PRAVACHOL) 10 MG tablet TAKE 1 TABLET (10 MG TOTAL) BY MOUTH ONCE DAILY. 90 tablet 3     No current facility-administered medications on file prior to visit.        OB History    Para Term  AB Living   2 1 1   1     SAB TAB Ectopic Multiple Live Births                  # Outcome Date GA Lbr Jag/2nd Weight Sex Delivery Anes PTL Lv   2 Term            1 AB                 Past OB History      Gynecologic History  LMP: Patient's last menstrual period was 10/01/2003.  Method of  "contraception: post-menopausal  Age of menarche: 10  Age of menopause: 50s  Menstrual history: regular    Well Woman  Last pap: 2018 -- normal  Last mammogram: 2019 -- normal  Colonoscopy: 2015  DEXA: 2017 -- osteopenia -- order in, patient needs to go     Review of Systems A 14 point ROS was reviewed with pertinent positives as noted above in the history of present illness.      Constitutional: negative  Eyes: negative  Endocrine: negative  Gastrointestinal: negative  Cardiovascular: negative  Respiratory: negative  Allergic/Immunologic: negative  Integumentary: negative  Psychiatric: negative  Musculoskeletal: negative   Ear/Nose/Throat: negative  Neurologic: negative  Genitourinary: SEE HPI  Hematologic/Lymphatic: negative   Breast: negative    Urogynecologic Exam  /80 (BP Location: Right arm, Patient Position: Sitting)   Ht 5' 8" (1.727 m)   Wt 71.9 kg (158 lb 8.2 oz)   LMP 10/01/2003   BMI 24.10 kg/m²     GENERAL APPEARANCE:  The patient is a well-developed, well-nourished thin female.  Neck:  Supple with no thyromegaly, no carotid bruits.  Heart:  Regular rate and rhythm, no murmurs, rubs or gallops.  Lungs:  Clear.  No CVA tenderness.  Abdomen:  Soft, nontender, nondistended, no hepatosplenomegaly.    PELVIC:    External genitalia:  Normal Bartholins, Skenes and labia bilaterally.    Urethra:  No caruncle, diverticulum or masses.  (+) hypermobility.    Vagina:  Atrophy (+) , no bladder masses or tender, no discharge.  Unable to tolerate bimanual exam due pain. Small, dry introitus  Cervix: unable to tolerate bimanual exam  Uterus: unable to tolerate bimanual exam  Adnexa: unable to tolerate bimanual exam    POP-Q: Deferred.  No obvious POP present with valsalva.     NEUROLOGIC:  Cranial nerves 2 through 12 intact.  Strength 5/5.  DTRs 2+ lower extremities.  S2 through 4 normal.  Sacral reflexes intact.    EXT: AHUMADA, 2+ pulses bilaterally, no C/C/E    COUGH STRESS TEST:  negative  KEGEL: unable to " assess    RECTAL:    External:  Normal, (--) hemorrhoids, (--) dovetailing.   Internal: deferred    PVR: 30 mL      Impression  1. Vaginal atrophy    2. Urinary incontinence, unspecified type        Initial Plan  The patient was counseled regarding these issues. She was given a summary sheet containing each of these issues with possible options for evaluation and management. We also reviewed computer-generated diagrams specific to her pelvic organ prolapse quantification findings and she was given a copy of this for her records.  All her questions were addressed to her satisfaction.     1)  Mixed urinary incontinence, urge > stress:    --Empty bladder every 3 hours.  Empty well: wait a minute, lean forward on toilet.  --  Drink more water and avoid bladder irritants  --Avoid dietary irritants (see sheet).  Keep diary x 3-5 days to determine your irritants.  --KEGELS: do 10 in AM and 10 in PM, holding each x 10 seconds.  When you feel urge to go, STOP, KEGEL, and when urge has passed, then go to bathroom.   --Refer to pelvic floor PT. Please call to make appointment with pelvic floor PT most convenient for you. [facility] (Kelli Jimenez or Klarissa Rueda): (p) [phone].  Or [phone]. **Be sure to start using vaginal estrogen cream for 2 weeks before starting PT. If you need a vaginal pain medication before PT, call and let us know after first appointment, we can prescribe vaginal suppository.    --URGE: consider anticholinergic if behavioral modifications are not sufficient    --STRESS:  Pessary vs. Sling.     2) Vaginal atrophy (dryness):   -- Use 1 gram of estrogen cream in vagina nightly x 2 weeks, then twice a week thereafter.    --Can also use if Estrogen too expensive: Use REPLENS or REFRESH OTC: 1/2 applicator full in vagina twice a week.      3) Nocturia (nighttime urination):  -- stop fluids 2 hours before bed.   -- If have leg swelling:  Elevate feet above chest x 1 hour before bed to get excess  fluid off.   -- Can also use support hose (knee highs).      4) Constipation/Straining:  -- Controlling constipation may help bladder urgency/leakage and fiber may better control cholesterol and blood glucose.    -- Start daily fiber to help regulate bowel movements.  Take 1 tsp of fiber powder (psyllium or other sugar-free powder).  Mix in 8 oz of water.  Take x 3-5 days.  Then, increase fiber by 1 tsp every 3-5 days until stool is easy to pass.  Stop and continue at that dose.   Do not exceed 6 tsps/day.  May also use over the counter stool softener 1-2 x/day if constipated.  AVOID laxatives.    RTC in 2 months (after been in PT for a month or so)     Approximately 45 min were spent in consult, 85 % in discussion.     Patient discussed with Dr. Polanco.     Thank you for requesting consultation of your patient.  I look forward to participating in her care.    Rogers Nagy PA-C  Division of Female Pelvic Medicine and Reconstructive Surgery  Department of Obstetrics and Gynecology  [facility] [place], [place]

## 2020-05-27 NOTE — PATIENT INSTRUCTIONS
1)  Mixed urinary incontinence, urge > stress:    --Empty bladder every 3 hours.  Empty well: wait a minute, lean forward on toilet.  --  Drink more water and avoid bladder irritants  --Avoid dietary irritants (see sheet).  Keep diary x 3-5 days to determine your irritants.  --KEGELS: do 10 in AM and 10 in PM, holding each x 10 seconds.  When you feel urge to go, STOP, KEGEL, and when urge has passed, then go to bathroom.   --Refer to pelvic floor PT. Please call to make appointment with pelvic floor PT most convenient for you. [facility] (Kelli Marino Barbara or Klarissa Rueda): (p) [phone].  Or [phone]. **Be sure to start using vaginal estrogen cream for 2 weeks before starting PT. If you need a vaginal pain medication before PT, call and let us know after first appointment, we can prescribe vaginal suppository.    --URGE: consider anticholinergic if behavioral modifications are not sufficient    --STRESS:  Pessary vs. Sling.     2) Vaginal atrophy (dryness):   -- Use 1 gram of estrogen cream in vagina nightly x 2 weeks, then twice a week thereafter.    --Cam also use if Estrogen too expensive: Use REPLENS or REFRESH OTC: 1/2 applicator full in vagina twice a week.      3) Nocturia (nighttime urination):  -- stop fluids 2 hours before bed.   -- If have leg swelling:  Elevate feet above chest x 1 hour before bed to get excess fluid off.   -- Can also use support hose (knee highs).      4) Constipation/Straining:  -- Controlling constipation may help bladder urgency/leakage and fiber may better control cholesterol and blood glucose.    -- Start daily fiber to help regulate bowel movements.  Take 1 tsp of fiber powder (psyllium or other sugar-free powder).  Mix in 8 oz of water.  Take x 3-5 days.  Then, increase fiber by 1 tsp every 3-5 days until stool is easy to pass.  Stop and continue at that dose.   Do not exceed 6 tsps/day.  May also use over the counter stool softener 1-2 x/day if constipated.  AVOID  laxatives.    RTC in 2 months (after been in PT for a month or so)

## 2020-05-27 NOTE — LETTER
June 5, 2020      Iris Blue MD  [address]  [facility] [address]           [facility] UroGynecology-RyHaimefzFnh168   [address] [address]  Phone: [phone]          Patient: Manisha Wynne   MR Number: [id number]   YOB: 1950   Date of Visit: 5/27/2020       Dear Dr. Iris Blue:    Thank you for referring Manisha Wynne to me for evaluation. Attached you will find relevant portions of my assessment and plan of care.    If you have questions, please do not hesitate to call me. I look forward to following Manisha Wynne along with you.    Sincerely,    JACE Francesosure  CC:  No Recipients    If you would like to receive this communication electronically, please contact [email or web address] or [phone] to request more information on [employer] Link access.    For providers and/or their staff who would like to refer a patient to [facility], please contact us through our one-stop-shop provider referral line, [facility], at [phone].    If you feel you have received this communication in error or would no longer like to receive these types of communications, please e-mail [email or web address]

## 2020-05-28 ENCOUNTER — PATIENT MESSAGE (OUTPATIENT)
Dept: UROGYNECOLOGY | Facility: CLINIC | Age: 70
End: 2020-05-28

## 2020-05-28 ENCOUNTER — TELEPHONE (OUTPATIENT)
Dept: UROGYNECOLOGY | Facility: CLINIC | Age: 70
End: 2020-05-28

## 2020-05-28 NOTE — TELEPHONE ENCOUNTER
Spoke to patient about vaginal estrogen cream, it was $141 for a tube with her insurance, too much for her. Told her that [facility] will compound it for around $50-60 but if that is too much, I understand, she can use OTC Refresh or Replens for now and will reassess at next visit. Told patient that the most beneficial for her will be PT.

## 2020-05-28 NOTE — TELEPHONE ENCOUNTER
Spoke to patient. See telephone note.     Please advise,    Rogers,     The EstradoiL is $141. Before I purchase  it, I have some questions.    1) How long will I be using it?  2) Will I need to refill it?        a) If I do need it, can I switch to one of the OTC brands after I finish it?  3) What is the difference between using it and an OTC brand?  4) How effective are the OTC brands?  5) The OTC brands come in boxes of 8 and 14 doses, is there a difference in how much I purchase?    Thank you. I appreciate your patience. I did purchase the compounded Valium suppository.     Manisha

## 2020-06-01 ENCOUNTER — OFFICE VISIT (OUTPATIENT)
Dept: PSYCHIATRY | Facility: CLINIC | Age: 70
End: 2020-06-01
Payer: MEDICARE

## 2020-06-01 ENCOUNTER — OFFICE VISIT (OUTPATIENT)
Dept: PODIATRY | Facility: CLINIC | Age: 70
End: 2020-06-01
Payer: MEDICARE

## 2020-06-01 ENCOUNTER — OFFICE VISIT (OUTPATIENT)
Dept: ENDOCRINOLOGY | Facility: CLINIC | Age: 70
End: 2020-06-01
Payer: MEDICARE

## 2020-06-01 ENCOUNTER — PATIENT OUTREACH (OUTPATIENT)
Dept: OTHER | Facility: OTHER | Age: 70
End: 2020-06-01

## 2020-06-01 VITALS
HEIGHT: 68 IN | RESPIRATION RATE: 17 BRPM | BODY MASS INDEX: 23.95 KG/M2 | HEART RATE: 67 BPM | DIASTOLIC BLOOD PRESSURE: 87 MMHG | SYSTOLIC BLOOD PRESSURE: 157 MMHG | WEIGHT: 158 LBS

## 2020-06-01 VITALS
HEIGHT: 68 IN | WEIGHT: 158.81 LBS | BODY MASS INDEX: 24.07 KG/M2 | HEART RATE: 62 BPM | SYSTOLIC BLOOD PRESSURE: 138 MMHG | DIASTOLIC BLOOD PRESSURE: 80 MMHG

## 2020-06-01 DIAGNOSIS — F98.8 ATTENTION DEFICIT DISORDER (ADD) IN ADULT: ICD-10-CM

## 2020-06-01 DIAGNOSIS — I10 ESSENTIAL HYPERTENSION: Chronic | ICD-10-CM

## 2020-06-01 DIAGNOSIS — B35.3 TINEA PEDIS OF BOTH FEET: ICD-10-CM

## 2020-06-01 DIAGNOSIS — F33.41 MAJOR DEPRESSIVE DISORDER, RECURRENT EPISODE, IN PARTIAL REMISSION: Primary | ICD-10-CM

## 2020-06-01 DIAGNOSIS — M81.8 OTHER OSTEOPOROSIS WITHOUT CURRENT PATHOLOGICAL FRACTURE: ICD-10-CM

## 2020-06-01 DIAGNOSIS — M20.41 HAMMER TOES OF BOTH FEET: Primary | ICD-10-CM

## 2020-06-01 DIAGNOSIS — L60.8 TOENAIL DEFORMITY: ICD-10-CM

## 2020-06-01 DIAGNOSIS — L60.9 DISEASE OF NAIL: ICD-10-CM

## 2020-06-01 DIAGNOSIS — E21.0 PRIMARY HYPERPARATHYROIDISM: Primary | ICD-10-CM

## 2020-06-01 DIAGNOSIS — G60.8 SENSORY PERIPHERAL NEUROPATHY: ICD-10-CM

## 2020-06-01 DIAGNOSIS — N18.30 CKD (CHRONIC KIDNEY DISEASE) STAGE 3, GFR 30-59 ML/MIN: ICD-10-CM

## 2020-06-01 DIAGNOSIS — R68.3 CLUBBING OF NAILS: ICD-10-CM

## 2020-06-01 DIAGNOSIS — M20.42 HAMMER TOES OF BOTH FEET: Primary | ICD-10-CM

## 2020-06-01 PROCEDURE — 1101F PT FALLS ASSESS-DOCD LE1/YR: CPT | Mod: HCNC,CPTII,S$GLB, | Performed by: INTERNAL MEDICINE

## 2020-06-01 PROCEDURE — 1101F PR PT FALLS ASSESS DOC 0-1 FALLS W/OUT INJ PAST YR: ICD-10-PCS | Mod: HCNC,CPTII,S$GLB, | Performed by: PODIATRIST

## 2020-06-01 PROCEDURE — 90834 PSYTX W PT 45 MINUTES: CPT | Mod: HCNC,95,, | Performed by: SOCIAL WORKER

## 2020-06-01 PROCEDURE — 3074F SYST BP LT 130 MM HG: CPT | Mod: HCNC,CPTII,S$GLB, | Performed by: PODIATRIST

## 2020-06-01 PROCEDURE — 1159F MED LIST DOCD IN RCRD: CPT | Mod: HCNC,S$GLB,, | Performed by: INTERNAL MEDICINE

## 2020-06-01 PROCEDURE — 99999 PR PBB SHADOW E&M-EST. PATIENT-LVL IV: ICD-10-PCS | Mod: PBBFAC,HCNC,, | Performed by: PODIATRIST

## 2020-06-01 PROCEDURE — 90834 PR PSYCHOTHERAPY W/PATIENT, 45 MIN: ICD-10-PCS | Mod: HCNC,95,, | Performed by: SOCIAL WORKER

## 2020-06-01 PROCEDURE — 3075F PR MOST RECENT SYSTOLIC BLOOD PRESS GE 130-139MM HG: ICD-10-PCS | Mod: HCNC,CPTII,S$GLB, | Performed by: INTERNAL MEDICINE

## 2020-06-01 PROCEDURE — 11719 TRIM NAIL(S) ANY NUMBER: CPT | Mod: Q9,HCNC,S$GLB, | Performed by: PODIATRIST

## 2020-06-01 PROCEDURE — 11719 PR TRIM NAIL(S): ICD-10-PCS | Mod: Q9,HCNC,S$GLB, | Performed by: PODIATRIST

## 2020-06-01 PROCEDURE — 99214 OFFICE O/P EST MOD 30 MIN: CPT | Mod: HCNC,S$GLB,, | Performed by: INTERNAL MEDICINE

## 2020-06-01 PROCEDURE — 3074F PR MOST RECENT SYSTOLIC BLOOD PRESSURE < 130 MM HG: ICD-10-PCS | Mod: HCNC,CPTII,S$GLB, | Performed by: PODIATRIST

## 2020-06-01 PROCEDURE — 1101F PR PT FALLS ASSESS DOC 0-1 FALLS W/OUT INJ PAST YR: ICD-10-PCS | Mod: HCNC,CPTII,S$GLB, | Performed by: INTERNAL MEDICINE

## 2020-06-01 PROCEDURE — 1126F AMNT PAIN NOTED NONE PRSNT: CPT | Mod: HCNC,S$GLB,, | Performed by: INTERNAL MEDICINE

## 2020-06-01 PROCEDURE — 99203 PR OFFICE/OUTPT VISIT, NEW, LEVL III, 30-44 MIN: ICD-10-PCS | Mod: 25,HCNC,S$GLB, | Performed by: PODIATRIST

## 2020-06-01 PROCEDURE — 3079F PR MOST RECENT DIASTOLIC BLOOD PRESSURE 80-89 MM HG: ICD-10-PCS | Mod: HCNC,CPTII,S$GLB, | Performed by: INTERNAL MEDICINE

## 2020-06-01 PROCEDURE — 3079F PR MOST RECENT DIASTOLIC BLOOD PRESSURE 80-89 MM HG: ICD-10-PCS | Mod: HCNC,CPTII,S$GLB, | Performed by: PODIATRIST

## 2020-06-01 PROCEDURE — 99214 PR OFFICE/OUTPT VISIT, EST, LEVL IV, 30-39 MIN: ICD-10-PCS | Mod: HCNC,S$GLB,, | Performed by: INTERNAL MEDICINE

## 2020-06-01 PROCEDURE — 99999 PR PBB SHADOW E&M-EST. PATIENT-LVL III: ICD-10-PCS | Mod: PBBFAC,HCNC,, | Performed by: INTERNAL MEDICINE

## 2020-06-01 PROCEDURE — 99999 PR PBB SHADOW E&M-EST. PATIENT-LVL III: CPT | Mod: PBBFAC,HCNC,, | Performed by: INTERNAL MEDICINE

## 2020-06-01 PROCEDURE — 99999 PR PBB SHADOW E&M-EST. PATIENT-LVL IV: CPT | Mod: PBBFAC,HCNC,, | Performed by: PODIATRIST

## 2020-06-01 PROCEDURE — 1126F PR PAIN SEVERITY QUANTIFIED, NO PAIN PRESENT: ICD-10-PCS | Mod: HCNC,S$GLB,, | Performed by: INTERNAL MEDICINE

## 2020-06-01 PROCEDURE — 3079F DIAST BP 80-89 MM HG: CPT | Mod: HCNC,CPTII,S$GLB, | Performed by: PODIATRIST

## 2020-06-01 PROCEDURE — 1159F MED LIST DOCD IN RCRD: CPT | Mod: HCNC,S$GLB,, | Performed by: PODIATRIST

## 2020-06-01 PROCEDURE — 3079F DIAST BP 80-89 MM HG: CPT | Mod: HCNC,CPTII,S$GLB, | Performed by: INTERNAL MEDICINE

## 2020-06-01 PROCEDURE — 1126F PR PAIN SEVERITY QUANTIFIED, NO PAIN PRESENT: ICD-10-PCS | Mod: HCNC,S$GLB,, | Performed by: PODIATRIST

## 2020-06-01 PROCEDURE — 1126F AMNT PAIN NOTED NONE PRSNT: CPT | Mod: HCNC,S$GLB,, | Performed by: PODIATRIST

## 2020-06-01 PROCEDURE — 3075F SYST BP GE 130 - 139MM HG: CPT | Mod: HCNC,CPTII,S$GLB, | Performed by: INTERNAL MEDICINE

## 2020-06-01 PROCEDURE — 99499 RISK ADDL DX/OHS AUDIT: ICD-10-PCS | Mod: HCNC,S$GLB,, | Performed by: INTERNAL MEDICINE

## 2020-06-01 PROCEDURE — 99203 OFFICE O/P NEW LOW 30 MIN: CPT | Mod: 25,HCNC,S$GLB, | Performed by: PODIATRIST

## 2020-06-01 PROCEDURE — 1159F PR MEDICATION LIST DOCUMENTED IN MEDICAL RECORD: ICD-10-PCS | Mod: HCNC,S$GLB,, | Performed by: INTERNAL MEDICINE

## 2020-06-01 PROCEDURE — 1101F PT FALLS ASSESS-DOCD LE1/YR: CPT | Mod: HCNC,CPTII,S$GLB, | Performed by: PODIATRIST

## 2020-06-01 PROCEDURE — 1159F PR MEDICATION LIST DOCUMENTED IN MEDICAL RECORD: ICD-10-PCS | Mod: HCNC,S$GLB,, | Performed by: PODIATRIST

## 2020-06-01 PROCEDURE — 99499 UNLISTED E&M SERVICE: CPT | Mod: HCNC,S$GLB,, | Performed by: INTERNAL MEDICINE

## 2020-06-01 NOTE — PROGRESS NOTES
Subjective:      Patient ID: Manisha Wynne is a 69 y.o. female.    Chief Complaint:   PCP (Iris Blue MD 5/21/20); Hammer Toe; and Nail Problem (nails are curved in )    Manisha is a 69 y.o. female who presents to the clinic for evaluation and treatment of high risk feet. Manisha has a past medical history of Abnormal Pap smear, Atrial fibrillation, AV block, 1st degree (7/25/2012), Cataract, Depression (7/24/2012), Facet arthritis of lumbar region (3/31/2015), Falls, Hyperlipidemia, Hypertension (7/24/2012), Neuropathy, Other specified cardiac dysrhythmias(427.89), Sleep apnea, and Syncope (7/24/2012). The patient's chief complaint is long, thick toenails. This patient has documented high risk feet requiring routine maintenance secondary to peripheral neuropathy.    Pt relate that she had difficulty cutting her nails because of both the way they are shaped and also that she has little feeling in her feet. She had to put a bandaid on the left 3rd toe because one of the nails was cutting into it.  Pt relates she saw neurology and had testing but there is not a identifiable cause. Pt relates she was adopted so unsure what her parents feet looked like.   Pt relates she enrolled herself in a trial study in [place] and has been wearing devices to stimulate her feet to feel.     Pt denies any other issues.       PCP: Iris Blue MD    Date Last Seen by PCP: 5/21/20    Current shoe gear:  Affected Foot: Tennis shoes     Unaffected Foot: Tennis shoes    Last encounter in this department: Visit date not found    Hemoglobin A1C   Date Value Ref Range Status   11/29/2019 5.2 4.0 - 5.6 % Final     Comment:     ADA Screening Guidelines:  5.7-6.4%  Consistent with prediabetes  >or=6.5%  Consistent with diabetes  High levels of fetal hemoglobin interfere with the HbA1C  assay. Heterozygous hemoglobin variants (HbS, HgC, etc)do  not significantly interfere with this assay.   However, presence of multiple  variants may affect accuracy.     06/21/2019 5.4 4.0 - 5.6 % Final     Comment:     ADA Screening Guidelines:  5.7-6.4%  Consistent with prediabetes  >or=6.5%  Consistent with diabetes  High levels of fetal hemoglobin interfere with the HbA1C  assay. Heterozygous hemoglobin variants (HbS, HgC, etc)do  not significantly interfere with this assay.   However, presence of multiple variants may affect accuracy.     09/21/2018 5.4 4.0 - 5.6 % Final     Comment:     ADA Screening Guidelines:  5.7-6.4%  Consistent with prediabetes  >or=6.5%  Consistent with diabetes  High levels of fetal hemoglobin interfere with the HbA1C  assay. Heterozygous hemoglobin variants (HbS, HgC, etc)do  not significantly interfere with this assay.   However, presence of multiple variants may affect accuracy.          Past Medical History:   Diagnosis Date    Abnormal Pap smear     Atrial fibrillation     AV block, 1st degree 7/25/2012    Cataract     Depression 7/24/2012    Facet arthritis of lumbar region 3/31/2015    Falls     3 falls in the last 6 mos--noted 6/19/19    Hyperlipidemia     Hypertension 7/24/2012    Neuropathy     Other specified cardiac dysrhythmias(427.89)     Sleep apnea     Syncope 7/24/2012     Past Surgical History:   Procedure Laterality Date    APPLICATION OF CARTILAGE GRAFT Bilateral 12/19/2019    Procedure: APPLICATION, CARTILAGE GRAFT AURICULAR JEZ;  Surgeon: Martinez Flores III, MD;  Location: [facility];  Service: ENT;  Laterality: Bilateral;    CARDIAC PACEMAKER PLACEMENT  09/07/2012    [id number]    DILATION AND CURETTAGE OF UTERUS      Hx of precancerous cells?    ENDOSCOPIC ULTRASOUND OF UPPER GASTROINTESTINAL TRACT N/A 7/5/2019    Procedure: ULTRASOUND, UPPER GI TRACT, ENDOSCOPIC;  Surgeon: Kev Calderon MD;  Location: [facility] ENDO ([address] [place] [address]);  Service: Endoscopy;  Laterality: N/A;  Pacemaker-Adam   appt confirmed-rb    NASAL SEPTOPLASTY N/A 12/19/2019    Procedure:  SEPTOPLASTY, NOSE;  Surgeon: Martinez Flores III, MD;  Location: [facility] OR;  Service: ENT;  Laterality: N/A;  FOLLOW DR SALVATORE KIMBROUGH PROTOCOL    OPEN REDUCTION AND INTERNAL FIXATION (ORIF) OF FRACTURE OF DISTAL RADIUS Left 1/24/2020    Procedure: ORIF, FRACTURE, RADIUS, DISTAL-left;  Surgeon: Ellen Moe MD;  Location[facility] OR;  Service: Orthopedics;  Laterality: Left;    SURGICAL REMOVAL OF NASAL TURBINATE Bilateral 12/19/2019    Procedure: EXCISION, NASAL TURBINATE;  Surgeon: Martinez Flores III, MD;  Location: [facility] OR;  Service: ENT;  Laterality: Bilateral;    TONSILLECTOMY      WISDOM TOOTH EXTRACTION       Current Outpatient Medications on File Prior to Visit   Medication Sig Dispense Refill    aspirin (ECOTRIN) 81 MG EC tablet Take 81 mg by mouth. 1 Tablet, Delayed Release (E.C.) Oral Every day      Baclofen 4% Diazepam 5mg Lidocaine 2% Vag Supp Place one suppository vaginally, 30 mins before PT. 18.14 g 0    buPROPion (WELLBUTRIN XL) 300 MG 24 hr tablet Take 300 mg by mouth once daily.       calcium-vitamin D 600 mg(1,500mg) -200 unit Tab Take 1 tablet by mouth once daily.       cholecalciferol, vitamin D3, (VITAMIN D3) 5,000 unit Tab Take 5,000 Units by mouth once daily.      estradioL (ESTRACE) 0.01 % (0.1 mg/gram) vaginal cream Place 1 g vaginally twice a week. (Patient not taking: Reported on 6/1/2020) 1 Tube 1    ferrous sulfate (FEOSOL) 325 mg (65 mg iron) Tab tablet Take 325 mg by mouth daily with breakfast. Taking two times daily      flu vacc [id number],65yr up,PF (FLUZONE HIGH-DOSE 2019-20, PF,) 180 mcg/0.5 mL Syrg admin as directed 0.5 mL 0    FLUoxetine 40 MG capsule Take 40 mg by mouth once daily.      lisdexamfetamine (VYVANSE) 70 MG capsule Take 70 mg by mouth every morning.      losartan (COZAAR) 25 MG tablet TAKE 1 TABLET EVERY DAY 90 tablet 6    metoprolol tartrate (LOPRESSOR) 25 MG tablet TAKE 1 TABLET (25 MG TOTAL) BY MOUTH 2 (TWO) TIMES DAILY. 180 tablet 3     "multivitamin-minerals-lutein (CENTRUM SILVER) Tab Take by mouth. 1 Tablet Oral Every day      pravastatin (PRAVACHOL) 10 MG tablet TAKE 1 TABLET (10 MG TOTAL) BY MOUTH ONCE DAILY. 90 tablet 3     No current facility-administered medications on file prior to visit.      Review of patient's allergies indicates:  No Known Allergies    Review of Systems   Constitution: Negative for chills, decreased appetite, fever, malaise/fatigue, night sweats, weight gain and weight loss.   Cardiovascular: Negative for chest pain, claudication, dyspnea on exertion, leg swelling, palpitations and syncope.   Respiratory: Negative for cough and shortness of breath.    Endocrine: Negative for cold intolerance and heat intolerance.   Hematologic/Lymphatic: Negative for bleeding problem. Does not bruise/bleed easily.   Skin: Positive for nail changes. Negative for color change, dry skin, flushing, itching, poor wound healing, rash, skin cancer, suspicious lesions and unusual hair distribution.   Musculoskeletal: Positive for arthritis, falls and stiffness. Negative for back pain, gout, joint pain, joint swelling, muscle cramps, muscle weakness, myalgias and neck pain.   Gastrointestinal: Negative for diarrhea, nausea and vomiting.   Neurological: Positive for numbness and paresthesias. Negative for dizziness, focal weakness, light-headedness, tremors, vertigo and weakness.   Psychiatric/Behavioral: Negative for altered mental status and depression. The patient does not have insomnia.    Allergic/Immunologic: Negative.            Objective:       Vitals:    06/01/20 0833   BP: (!) 157/87   Pulse: 67   Resp: 17   Weight: 71.7 kg (158 lb)   Height: 5' 8" (1.727 m)   PainSc: 0-No pain   71.7 kg (158 lb)     Physical Exam   Constitutional: She is oriented to person, place, and time. Vital signs are normal. She appears well-developed and well-nourished. She is cooperative.  Non-toxic appearance. She does not have a sickly appearance. She does " not appear ill. No distress.   Cardiovascular:   Pulses:       Dorsalis pedis pulses are 2+ on the right side, and 2+ on the left side.        Posterior tibial pulses are 2+ on the right side, and 2+ on the left side.   Musculoskeletal: She exhibits deformity. She exhibits no edema or tenderness.        Right foot: There is decreased range of motion and deformity.        Left foot: There is decreased range of motion. There is no deformity.   No pop or with rom    Reducible extensor and flexor contractures at the MTPJ and PIPJ of toes 2-5, bilat.    Feet:   Right Foot:   Protective Sensation: 10 sites tested. 2 sites sensed.   Skin Integrity: Positive for dry skin. Negative for ulcer, blister, skin breakdown, erythema, warmth or callus.   Left Foot:   Protective Sensation: 10 sites tested. 4 sites sensed.   Skin Integrity: Positive for dry skin. Negative for ulcer, blister, skin breakdown, erythema, warmth or callus.   Neurological: She is alert and oriented to person, place, and time. She displays atrophy. A sensory deficit is present. Gait abnormal.   Skin: Skin is warm and intact. Capillary refill takes 2 to 3 seconds. No rash noted. She is not diaphoretic. No erythema. No pallor. Nails show clubbing.   Nails 1-10 elongated and spoonshaped/clubbed    + macerations to interspaces.     No soi    No open lesions   Psychiatric: She has a normal mood and affect. Her behavior is normal. Judgment and thought content normal.                  Assessment:       Encounter Diagnoses   Name Primary?    Toenail deformity     Hammer toes of both feet Yes    Disease of nail     Sensory peripheral neuropathy     Tinea pedis of both feet     Clubbing of nails          Plan:       Manisha was seen today for pcp, hammer toe and nail problem.    Diagnoses and all orders for this visit:    Hammer toes of both feet    Toenail deformity  -     Ambulatory referral/consult to Podiatry    Disease of nail    Sensory peripheral  neuropathy    Tinea pedis of both feet    Clubbing of nails      I counseled the patient on her conditions, their implications and medical management.        - Shoe inspection. Patient instructed on proper foot hygeine. We discussed wearing proper shoe gear, daily foot inspections, never walking without protective shoe gear, never putting sharp instruments to feet, routine podiatric nail visits every 2-3 months.      - With patient's permission, nails were aggressively reduced and debrided x 10 to their soft tissue attachment mechanically  removing all offending nail and debris. Patient relates relief following the procedure.        -applied gentin violet to interspaces. Educated.     - encouraged pt to keep f/u with neurology    - f/u 3 months or sooner if needed; check feet daily

## 2020-06-01 NOTE — LETTER
June 1, 2020      Iris Blue MD  [address]  [facility] [place] [facility] [address]           [place] [facility] - Endocrinology 6th [place]  [address]  [facility] [address]  Phone: [phone]  Fax: [phone]          Patient: Manisha Wynne   MR Number: [id number]   YOB: 1950   Date of Visit: 6/1/2020       Dear Dr. Iris Blue:    Thank you for referring Manisha Wynne to me for evaluation. Attached you will find relevant portions of my assessment and plan of care.    If you have questions, please do not hesitate to call me. I look forward to following Manisha Wynne along with you.    Sincerely,    Cristine Villarreal MD    Enclosure  CC:  No Recipients    If you would like to receive this communication electronically, please contact [email or web address] or [phone] to request more information on [employer] Link access.    For providers and/or their staff who would like to refer a patient to [facility], please contact us through our one-stop-shop provider referral line, [facility], at [phone].    If you feel you have received this communication in error or would no longer like to receive these types of communications, please e-mail [email or web address]

## 2020-06-01 NOTE — PROGRESS NOTES
The patient location is: [place]  The chief complaint leading to consultation is: depression  Visit type: Virtual visit with synchronous audio and video  Total time spent with patient: 45 minutes  Each patient to whom he or she provides medical services by telemedicine is:  (1) informed of the relationship between the physician and patient and the respective role of any other health care provider with respect to management of the patient; and (2) notified that he or she may decline to receive medical services by telemedicine and may withdraw from such care at any time.    Notes: Individual Psychotherapy (PhD/LCSW)    6/1/2020    Site: telemed    Therapeutic Intervention: Met with patient alone.  Outpatient - Insight oriented psychotherapy 45 min - CPT code 16481    Chief complaint/reason for encounter: depression, distractibility     Interval history and content of current session: Pt's mood is brighter, more activated, walked twice with friend in park, had friend Petar come fix light in her kitchen, getting out of bed earlier at least some days, had some medical appointments. Pt gets pulled off track by social issues in the news, discuss keeping focus on her own life even as she reacts to social stressors.     Treatment plan:  · Target symptoms: depression, distractibility  · Why chosen therapy is appropriate versus another modality: relevant to diagnosis  · Outcome monitoring methods: self-report  · Therapeutic intervention type: insight oriented psychotherapy    Risk parameters:  Patient reports no suicidal ideation  Patient reports no homicidal ideation  Patient reports no self-injurious behavior  Patient reports no violent behavior    Verbal deficits: None    Patient's response to intervention:  The patient's response to intervention is motivated    Progress toward goals and other mental status changes:  The patient's progress toward goals is good    Diagnosis: Major depression, recurrent, in partial  remission,  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

## 2020-06-01 NOTE — PROGRESS NOTES
Subjective:      Patient ID: Manisha Wynne is a 69 y.o. female.    Chief Complaint:   primary hyperparathyroidism:      History of Present Illness  With regards to the  primary hyperparathyroidism:  Was found to have hypercalcemia on routine labs - first noted: 2019   Pth was checked:  2019 and was elevated     She did see Dr Agee       She denied current or past lithium use  Denies HCTZ use.    She does have ckd     Parathyroid imagin19  Impression       Near complete washout from the thyroid gland without focal uptake to suggest parathyroid adenoma in the expected region of the parathyroid glands.    In the midline upper chest there is prominent uptake on early images with faint retention on delayed.  Finding is nonspecific and ectopic parathyroid tissue cannot be definitively excluded.  Further evaluation with 4D contrast enhanced CT chest may be warranted if there is further clinical concern.     CT neck 4D  Impression       1.  Midline mediastinal single lesion consistent with ectopic parathyroid adenoma.       Thyroid ultrasound:  19  Impression       1.)  Thyroid gland is normal in size with homogeneous echotexture and normal vascularity    2.) A subcentimeter colloid containing cyst is seen in the right superior pole    3.) A small subcentimter cyst is seen in the left mid lobe    4.) 1.07 x 0.50 x 0.60 cm cystic nodule is seen in the isthmus    5.)  0.55 x 0.41 x 0.54 cm cystic mass in the left inferior pole of the thyroid which most likely represents an thyroid cyst but cannot definitively exclude parathyroid adenoma or parathyroid cyst    RECOMMENDATIONS:  No thyroid nodules meeting FNA criteria.  Recommend correlation with nuclear medicine parathyroid imaging and parathyroid protocol CT if parathyroidectomy is being considered.           Daily intake of calcium is:  Taking vitamin D: 5000 iu qd     Denies constipation, depression, polyuria     Last bmd was:   11/2017  Impression       Osteopenia, of spine and hip. FRAX calculation does not support treatment for osteoporosis. Total hip and lumbar spine BMD unchanged.        Denies kidney stones    prone to falls - sensory neuropathy - part of a [facility] trial  Right patella fracture   2009 right wrist   2012 clavicle    2020 left wrist after a fall- Status post ORIF of the distal left radius     She still has some dental work to finish up with crown placement      Adopted - no known FH         Review of Systems   Constitutional: Negative for unexpected weight change.   Eyes: Negative for visual disturbance.   Respiratory: Negative for shortness of breath.    Cardiovascular: Negative for chest pain.   Gastrointestinal: Negative for abdominal pain.   Musculoskeletal: Negative for arthralgias.   Skin: Negative for wound.   Neurological: Negative for headaches.   Hematological: Does not bruise/bleed easily.   Psychiatric/Behavioral: Negative for sleep disturbance.       Objective:   Physical Exam   Neck: No thyromegaly present.   Cardiovascular: Normal rate.   Pulmonary/Chest: Effort normal.   Abdominal: Soft.   Musculoskeletal: She exhibits no edema.   Vitals reviewed.      Body mass index is 24.15 kg/m².    Lab Review:   Lab Results   Component Value Date    HGBA1C 5.2 11/29/2019     Lab Results   Component Value Date    CHOL 212 (H) 05/18/2020    HDL 69 05/18/2020    LDLCALC 119.0 05/18/2020    TRIG 120 05/18/2020    CHOLHDL 32.5 05/18/2020     Lab Results   Component Value Date     05/26/2020    K 4.4 05/26/2020     05/26/2020    CO2 26 05/26/2020    GLU 77 05/26/2020    BUN 15 05/26/2020    CREATININE 1.1 05/26/2020    CALCIUM 9.5 05/26/2020    PROT 7.2 05/18/2020    ALBUMIN 3.9 05/18/2020    BILITOT 0.5 05/18/2020    ALKPHOS 67 05/18/2020    AST 27 05/18/2020    ALT 27 05/18/2020    ANIONGAP 6 (L) 05/26/2020    ESTGFRAFRICA 58.4 (A) 05/26/2020    EGFRNONAA 50.6 (A) 05/26/2020    TSH 3.601 05/18/2020            Assessment and Plan     Other osteoporosis without current pathological fracture  Based on fragility fracture    Repeat bone density scheduled    She is at very high risk for subsequent falls and fractures.  Discussed Prolia and she will look into it.  She needs to finish her dental work 1st    Primary hyperparathyroidism   Surgical indications are ckd but surgery would be more difficult due to location   For now we will observe Calcium every 6 months if hypercalcemia becomes a significant problem we can do medical management with cinacalcet       Avoid dehydration, excessive calcium supplementation and meds (HCTZ)  that can worsen hypercalcemia.       CKD (chronic kidney disease) stage 3, GFR 30-59 ml/min  As above       Hypertension  controlled  Avoid hctz     Will review bone density and plan Prolia once dental work is done.  Labs every 6 months return to clinic yearly

## 2020-06-01 NOTE — LETTER
June 1, 2020      Iris Blue MD  [address]  [facility] [address]           [place] - Podiatry  [address] [facility] [address]  [facility] [address]  Phone: [phone]  Fax: [phone]          Patient: Manisha Wynne   MR Number: [id number]   YOB: 1950   Date of Visit: 6/1/2020       Dear Dr. Iris Blue:    Thank you for referring Manisha Wynne to me for evaluation. Attached you will find relevant portions of my assessment and plan of care.    If you have questions, please do not hesitate to call me. I look forward to following Manisha Wynne along with you.    Sincerely,    Patricia Rao DPM    Enclosure  CC:  No Recipients    If you would like to receive this communication electronically, please contact [email or web address] or [phone] to request more information on [employer] Link access.    For providers and/or their staff who would like to refer a patient to [facility], please contact us through our one-stop-shop provider referral line, [facility], at [phone].    If you feel you have received this communication in error or would no longer like to receive these types of communications, please e-mail [email or web address]

## 2020-06-02 NOTE — PROGRESS NOTES
Subjective:       Patient ID: Manisha Wynne is a 69 y.o. female.    Chief Complaint: Follow-up    HPIPt is feeling well - no CP or SOB.  No falls in the last few months - but they are felt to be from neuropathy of the feet.  Still waiting for eval from endocrine regarding parathyroid.  Some urinary incontinence.  Review of Systems   Constitutional: Positive for activity change. Negative for unexpected weight change.   HENT: Negative for hearing loss, rhinorrhea and trouble swallowing.    Eyes: Negative for discharge and visual disturbance.   Respiratory: Negative for chest tightness, shortness of breath (PND or orthopnea) and wheezing.    Cardiovascular: Negative for chest pain and palpitations.   Gastrointestinal: Negative for abdominal pain, blood in stool, constipation, diarrhea, nausea and vomiting.   Endocrine: Negative for polydipsia and polyuria.   Genitourinary: Negative for difficulty urinating, dysuria, hematuria and menstrual problem.   Musculoskeletal: Negative for arthralgias, joint swelling and neck pain.   Neurological: Negative for seizures, syncope, weakness and headaches.   Psychiatric/Behavioral: Positive for dysphoric mood. Negative for confusion.       Objective:      Physical Exam   Constitutional: She is oriented to person, place, and time. She appears well-developed and well-nourished. No distress.   HENT:   Head: Normocephalic.   Mouth/Throat: Oropharynx is clear and moist.   Neck: Neck supple. No JVD present. No thyromegaly present.   Cardiovascular: Normal rate, regular rhythm, normal heart sounds and intact distal pulses. Exam reveals no gallop and no friction rub.   No murmur heard.  Pulmonary/Chest: Effort normal and breath sounds normal. She has no wheezes. She has no rales.   Abdominal: Soft. Bowel sounds are normal. She exhibits no distension and no mass. There is no tenderness. There is no rebound and no guarding.   Musculoskeletal: She exhibits no edema.    Lymphadenopathy:     She has no cervical adenopathy.   Neurological: She is alert and oriented to person, place, and time. She has normal reflexes.   Skin: Skin is warm and dry.   Psychiatric: She has a normal mood and affect. Her behavior is normal. Judgment and thought content normal.       Assessment:       1. Primary hyperparathyroidism    2. Multiple falls    3. Urinary incontinence, unspecified type    4. Toenail deformity        Plan:   Primary hyperparathyroidism  -     DXA Bone Density Spine And Hip; Future; Expected date: 05/21/2020  -     PTH, intact; Future; Expected date: 05/21/2020  -     Ambulatory referral/consult to Endocrinology; Future; Expected date: 05/28/2020  -     Basic metabolic panel; Future; Expected date: 05/21/2020    Multiple falls  -     Ambulatory referral/consult to Neurology; Future; Expected date: 05/28/2020    Urinary incontinence, unspecified type  -     Ambulatory referral/consult to Urogynecology; Future; Expected date: 05/28/2020  -     Urinalysis  -     Urine culture    Toenail deformity  -     Ambulatory referral/consult to Podiatry; Future; Expected date: 05/28/2020    Other orders  -     Urinalysis Microscopic

## 2020-06-04 ENCOUNTER — TELEPHONE (OUTPATIENT)
Dept: NEUROLOGY | Facility: CLINIC | Age: 70
End: 2020-06-04

## 2020-06-04 NOTE — TELEPHONE ENCOUNTER
Called pt. Not able to speak with her. Left  appt scheduled and will send out an appt letter in mail and send message through pt portal.

## 2020-06-08 ENCOUNTER — PATIENT OUTREACH (OUTPATIENT)
Dept: OTHER | Facility: OTHER | Age: 70
End: 2020-06-08

## 2020-06-08 ENCOUNTER — APPOINTMENT (OUTPATIENT)
Dept: RADIOLOGY | Facility: CLINIC | Age: 70
End: 2020-06-08
Attending: INTERNAL MEDICINE
Payer: MEDICARE

## 2020-06-08 DIAGNOSIS — E21.0 PRIMARY HYPERPARATHYROIDISM: ICD-10-CM

## 2020-06-08 PROCEDURE — 77080 DXA BONE DENSITY AXIAL: CPT | Mod: 26,HCNC,, | Performed by: INTERNAL MEDICINE

## 2020-06-08 PROCEDURE — 77080 DEXA BONE DENSITY SPINE HIP: ICD-10-PCS | Mod: 26,HCNC,, | Performed by: INTERNAL MEDICINE

## 2020-06-08 PROCEDURE — 77080 DXA BONE DENSITY AXIAL: CPT | Mod: TC,HCNC,PO

## 2020-06-10 PROBLEM — M62.81 MUSCLE WEAKNESS: Status: ACTIVE | Noted: 2020-06-10

## 2020-06-10 PROBLEM — M62.89 PELVIC FLOOR DYSFUNCTION: Status: ACTIVE | Noted: 2020-06-10

## 2020-06-10 PROBLEM — R27.8 OTHER LACK OF COORDINATION: Status: ACTIVE | Noted: 2020-06-10

## 2020-06-11 ENCOUNTER — CLINICAL SUPPORT (OUTPATIENT)
Dept: REHABILITATION | Facility: HOSPITAL | Age: 70
End: 2020-06-11
Payer: MEDICARE

## 2020-06-11 DIAGNOSIS — M62.81 MUSCLE WEAKNESS: ICD-10-CM

## 2020-06-11 DIAGNOSIS — M62.89 PELVIC FLOOR DYSFUNCTION: ICD-10-CM

## 2020-06-11 DIAGNOSIS — R27.8 OTHER LACK OF COORDINATION: ICD-10-CM

## 2020-06-11 DIAGNOSIS — R32 URINARY INCONTINENCE, UNSPECIFIED TYPE: ICD-10-CM

## 2020-06-11 PROCEDURE — 97161 PT EVAL LOW COMPLEX 20 MIN: CPT | Mod: HCNC,PO

## 2020-06-11 PROCEDURE — 97530 THERAPEUTIC ACTIVITIES: CPT | Mod: HCNC,PO

## 2020-06-11 PROCEDURE — 97112 NEUROMUSCULAR REEDUCATION: CPT | Mod: HCNC,PO

## 2020-06-11 NOTE — PLAN OF CARE
[facility]  Pelvic Health Physical Therapy Initial Evaluation    Date: 6/11/2020   Name: Manisha Wnyne  Clinic Number: [id number]  Therapy Diagnosis:   Encounter Diagnoses   Name Primary?    Muscle weakness     Pelvic floor dysfunction     Other lack of coordination     Urinary incontinence, unspecified type      Physician: Rogers Nagy PA-C    Physician Orders: PT Eval and Treat   Medical Diagnosis from Referral: urinary incontinence  Evaluation Date: 6/11/2020  Authorization Period Expiration: 12-  Plan of Care Expiration: 9-3-2020  Visit # / Visits authorized: 1/ 30    Time In: 1335  Time Out: 1400  Total Appointment Time (timed & untimed codes): 55 minutes    Precautions: universal    Subjective     Date of onset: 1-2 years ago     History of current condition - Manisha reports: Pt reports she started having issues with bladder control about 1-2 years ago but has recently worsened.  She reports it worse in the morning and she will have urge incontinence.  She also reports issues with controlling her bladder with air traveling.  Occasional stress incontinence reported.  She is currently on a 2 hour voiding schedule for urination.              Pt reports history of 4 falls within the last year.  The reports a broken nose from the first fall and a broke her knee cap from the 3rd and broken left wrist from the 4th fall.  She reports the falls are from peripheral neuropathy.      She is currently participating in a clinical trial for Walkasins which she wears around both ankles to see if it helps with balance.      OB/GYN History: 1 pregnancy reported which was aborted.  Has also had D and C for cancerous cell removal x 2   Sexually active? No  Pain with vaginal exams, intercourse or tampon use? Yes    Bladder/Bowel History: dribbling after urination and urinary incontinence   Frequency of urination:   Daytime: 4x           Nighttime: 1-3x   Difficulty initiating urine stream:  Yes   Urine stream: weak and strong   Complete emptying: No   Bladder leakage: Yes   Frequency of incidents: daily    Amount leaked (urine): few drops, teaspoon(s) and small squirt    Urinary Urgency: Yes, Able to delay the urge for at least 1 minute(s).   Frequency of bowel movements: once a day   Difficulty initiating BM: No   Quality/Shape of BM: [place] Stool Chart 4   Does Patient Feel Empty after BM? Yes   Fiber Supplements or Laxative Use? Yes Metamucil    Colon leakage: No   Form of protection: pads or pullups when traveling or occasionally at night.      Pain:  Location: pelvic pain   Current 0/10, worst 6/10, best 0/10   Description: Sharp and Shooting  Aggravating Factors/Activities that cause symptoms: Vaginal exam/provocation   Easing Factors: termination of exam   Pain lasts about 2 hours after exam.      Medical History: Manisha  has a past medical history of Abnormal Pap smear, Atrial fibrillation, AV block, 1st degree (7/25/2012), Cataract, Depression (7/24/2012), Facet arthritis of lumbar region (3/31/2015), Falls, Hyperlipidemia, Hypertension (7/24/2012), Neuropathy, Other specified cardiac dysrhythmias(427.89), Sleep apnea, and Syncope (7/24/2012).     Surgical History: Manisha Wynne  has a past surgical history that includes Tonsillectomy; Dilation and curettage of uterus; Cardiac pacemaker placement (09/07/2012); Endoscopic ultrasound of upper gastrointestinal tract (N/A, 7/5/2019); [place] tooth extraction; Nasal septoplasty (N/A, 12/19/2019); Application of cartilage graft (Bilateral, 12/19/2019); Surgical removal of nasal turbinate (Bilateral, 12/19/2019); and Open reduction and internal fixation (ORIF) of fracture of distal radius (Left, 1/24/2020).    Medications: Manisha has a current medication list which includes the following prescription(s): aspirin, Baclofen 4% Diazepam 5mg Lidocaine 2% Vag Supp, bupropion, calcium-vitamin d3, cholecalciferol (vitamin d3),  "estradiol, ferrous sulfate, flu vacc [id number](65yr up)pf, fluoxetine, glycerin/min oil/polycarbophil, lisdexamfetamine, losartan, metoprolol tartrate, multivitamin-minerals-lutein, and pravastatin.    Allergies: Review of patient's allergies indicates:  No Known Allergies         Prior Therapy/Previous treatment included: none reported  Social History: alone with 2 cats   Current exercise: walking  Occupation: Retired   Prior Level of Function: no issues reported  Current Level of Function: incontinence with ADLs     Types of fluid intake: water, coffee and tea  Diet:  normal   Habitus: well developed, well nourished  Abuse/Neglect: No None reported by patient     Pts goals: eliminate incontinence. "Get up and go to the bathroom without a problem."      OBJECTIVE     See EMR under MEDIA for written consent provided 6/11/2020  Chaperone: declined    ORTHO SCREEN  Posture in sitting: slouched   Posture in standing: forward head and forward and rounded shoulders       VAGINAL PELVIC FLOOR EXAM    EXTERNAL ASSESSMENT  Introitus: WNL  Skin condition: WNL  Scarring: none   Sensation: WNL   Pain: Nico bulbo-ischiocavernosus and STP  Voluntary contraction: pt initially performs a bridge.  When cued to just squeeze around vagina and anus not movement observed  Voluntary relaxation: nil  Involuntary contraction: nil  Bearing down: reflex tightening  Perineal descent: absent        INTERNAL ASSESSMENT  Pain: tender areas noted as follows: globally tender throughout layer 1-3 nico.    Sensation: able to sense generalized pressure, unable to identify location   Vaginal vault: stenotic   Muscle Bulk: hypertonus   Muscle Power: flicker        Quality of contraction: slow relaxation and incomplete relaxation   Specificity: patient contracts: gluts   Coordination: tends to hold breath during PFM contration         TREATMENT     Treatment Time In: 1335  Treatment Time Out: 1400  Total Treatment time (time-based codes) " separate from Evaluation: 25 minutes    Neuromuscular Re-education to develop Coordination, Control and Down training for 15 minutes including:   pelvic floor relaxation/bulging training    Therapeutic Activity Patient participated in dynamic functional therapeutic activities to improve functional performance for 10 minutes. Including: Education as described below.     Patient Education provided:   general anatomy/physiology of urinary/ bowel  system and benefits of treatment was discussed with the pt. Additionally, anatomy/physiology of pelvic floor and Coordination of kegels with functional activities such as cough, laugh, sneeze, lift, etc.  was reviewed.     Home Exercises provided:  Written Home Exercises provided: yes.  Exercises were reviewed and Manisha was able to demonstrate them prior to the end of the session.    Manisha demonstrated good  understanding of the education provided.     See EMR under Patient Instructions for exercises provided 6/11/2020.    Assessment     Manisha is a 69 y.o. female referred to outpatient Physical Therapy with a medical diagnosis of mixed urinary incontinence and pelvic pain. Pt presents with pelvic floor tenderness, increased tension of the pelvic muscles and poor quality of pelvic muscle contraction.  Examination reveals pelvic floor coordination deficits and increased muscular tone with soft tissue restrictions. The pt is expected to benefit from skilled intervention to work towards improving pelvic floor relaxation and coordination as well as improving overall strength and ROM in order to return towards prior level of function and ADL participation and to improve bladder control and have comfortable vaginal exams.            Pt prognosis is Good.   Pt will benefit from skilled outpatient Physical Therapy to address the deficits stated above and in the chart below, provide pt/family education, and to maximize pt's level of independence.     Plan of care discussed with  "patient: Yes  Pt's spiritual, cultural and educational needs considered and patient is agreeable to the plan of care and goals as stated below:     Anticipated Barriers for therapy: none    Medical Necessity is demonstrated by the following:    History  Co-morbidities and personal factors that may impact the plan of care Co-morbidities   HTN and frequent falls, neuropathy, sleep apnea     Personal Factors  no deficits     low   Examination  Body structures and functions, activity limitations and participation restrictions that may impact the plan of care Body Regions/Systems/Functions:  pelvic floor tenderness, increased tension of the pelvic muscles and poor quality of pelvic muscle contraction     Activity Limitations:  delaying urge to urinate and intercourse/vaginal exam/tampon use without pain    Participation Restrictions:  all ADLs/iADLs uninterrupted by urinary incontinence/urgency/frequency and well woman's exam    Activity limitations:   Learning and applying knowledge  no deficits    General Tasks and Commands  no deficits    Communication  no deficits    Mobility  no deficits    Self care  no deficits    Domestic Life  no deficits    Interactions/Relationships  no deficits    Life Areas  no deficits    Community and Social Life  no deficits       low   Clinical Presentation stable and uncomplicated low   Decision Making/ Complexity Score: low       Goals:  Short Term Goals: 4 weeks   Pt to perform "the knack" prior to coughing, laughing or sneezing to decrease risk of incontinence.  Pt to demonstrate being able to correctly and consistently perform a kegel which is needed  to increase pelvic floor muscle coordination and strength needed for continence.  Pt to be able to delay the urge to urinate at least 5 minutes with a strong urge to urinate in order to make it to the bathroom without leaking.  Pt to voice understanding of the role that diet plays on urinary urgency.    Pt to demonstrate proper " diaphragmatic breathing to help with calming the nervous system in order to decrease pain and to improve abdominal wall musculature extensibility.   Pt will report minimal to no pain with single digit pelvic assessment and display relaxation during this assessment in order to progress to dilator use.    Long Term Goals: 12 weeks   Pt to be discharged with home plan for carry over after discharge.    Pt to no longer need to wear a pad for protection due to reduction of urinary incontinence by at least 85% with ADLs.  Pt to be able to delay the urge to urinate at least 10 minutes with a strong urge to urinate in order to make it to the bathroom without leaking.  Pt to report being able to have a comfortable vaginal exam without significant increase in pelvic pain.    Plan     Plan of care Certification: 6/11/2020 to 9-3-2020.    Outpatient Physical Therapy 1 times weekly for 12 weeks to include the following interventions: therapeutic exercises, therapeutic activity, neuromuscular re-education, manual therapy, modalities PRN, patient/family education, dry needling and self care/home management    Kelli Jimenez, PT

## 2020-06-11 NOTE — PATIENT INSTRUCTIONS
""Squeeze Before you Sneeze!"    Perform a kegel before coughing, laughing, sneezing, lifting, bending, squatting, pushing, pulling, getting out of bed, standing, etc    Pelvic Floor Relaxation Training Exercises:    1) Gently push out your pelvic floor.  Try to breathe out as you do so.  Perform 5 pushes 2-3x/day.       "

## 2020-06-22 ENCOUNTER — OFFICE VISIT (OUTPATIENT)
Dept: PSYCHIATRY | Facility: CLINIC | Age: 70
End: 2020-06-22
Payer: MEDICARE

## 2020-06-22 DIAGNOSIS — F98.8 ATTENTION DEFICIT DISORDER (ADD) IN ADULT: ICD-10-CM

## 2020-06-22 DIAGNOSIS — F33.41 MAJOR DEPRESSIVE DISORDER, RECURRENT EPISODE, IN PARTIAL REMISSION: Primary | ICD-10-CM

## 2020-06-22 PROCEDURE — 90834 PSYTX W PT 45 MINUTES: CPT | Mod: HCNC,95,, | Performed by: SOCIAL WORKER

## 2020-06-22 PROCEDURE — 90834 PR PSYCHOTHERAPY W/PATIENT, 45 MIN: ICD-10-PCS | Mod: HCNC,95,, | Performed by: SOCIAL WORKER

## 2020-06-22 NOTE — PROGRESS NOTES
The patient location is: [place]  The chief complaint leading to consultation is: depression  Visit type: Virtual visit with synchronous audio and video  Total time spent with patient: 45 minutes  Each patient to whom he or she provides medical services by telemedicine is:  (1) informed of the relationship between the physician and patient and the respective role of any other health care provider with respect to management of the patient; and (2) notified that he or she may decline to receive medical services by telemedicine and may withdraw from such care at any time.    Notes: Individual Psychotherapy (PhD/LCSW)    6/22/2020    Site: telemed    Therapeutic Intervention: Met with patient alone.  Outpatient - Insight oriented psychotherapy 45 min - CPT code 64032    Chief complaint/reason for encounter: depression, distractibility     Interval history and content of current session: Pt's mood continues brighter, more activated, participating in online meetings and groups, got laundry done and figured out how to close parts of house off from cats, which helps her accomplish tasks. She is in touch with friends, and notes more positive internal dialogue.    Treatment plan:  · Target symptoms: depression, distractibility  · Why chosen therapy is appropriate versus another modality: relevant to diagnosis  · Outcome monitoring methods: self-report  · Therapeutic intervention type: insight oriented psychotherapy    Risk parameters:  Patient reports no suicidal ideation  Patient reports no homicidal ideation  Patient reports no self-injurious behavior  Patient reports no violent behavior    Verbal deficits: None    Patient's response to intervention:  The patient's response to intervention is motivated    Progress toward goals and other mental status changes:  The patient's progress toward goals is good    Diagnosis: Major depression, recurrent, in partial remission,  ADD in adult     Plan:  individual  psychotherapy    Return to clinic: f/u as scheduled

## 2020-07-06 ENCOUNTER — OFFICE VISIT (OUTPATIENT)
Dept: PSYCHIATRY | Facility: CLINIC | Age: 70
End: 2020-07-06
Payer: MEDICARE

## 2020-07-06 DIAGNOSIS — F33.41 MAJOR DEPRESSIVE DISORDER, RECURRENT EPISODE, IN PARTIAL REMISSION: Primary | ICD-10-CM

## 2020-07-06 DIAGNOSIS — F98.8 ATTENTION DEFICIT DISORDER (ADD) IN ADULT: ICD-10-CM

## 2020-07-06 PROCEDURE — 90834 PSYTX W PT 45 MINUTES: CPT | Mod: HCNC,95,, | Performed by: SOCIAL WORKER

## 2020-07-06 PROCEDURE — 90834 PR PSYCHOTHERAPY W/PATIENT, 45 MIN: ICD-10-PCS | Mod: HCNC,95,, | Performed by: SOCIAL WORKER

## 2020-07-06 NOTE — PROGRESS NOTES
"The patient location is: [place]  The chief complaint leading to consultation is: depression  Visit type: Virtual visit with synchronous audio and video  Total time spent with patient: 45 minutes  Each patient to whom he or she provides medical services by telemedicine is:  (1) informed of the relationship between the physician and patient and the respective role of any other health care provider with respect to management of the patient; and (2) notified that he or she may decline to receive medical services by telemedicine and may withdraw from such care at any time.    Notes: Individual Psychotherapy (PhD/LCSW)    7/6/2020    Site: telemed    Therapeutic Intervention: Met with patient alone.  Outpatient - Insight oriented psychotherapy 45 min - CPT code 05888    Chief complaint/reason for encounter: depression, distractibility     Interval history and content of current session: Pt reports "bad week last week", mostly watching TV, missed meds one day despite using daily pillbox. Pt did meet friend for breakfast and friend gave her contact info for a woman who will come to her home and organize her. Pt is elated, has appointment to talk with the woman tonight. Many things pt would like to do are on hold because she can't get house cleared out.    Treatment plan:  · Target symptoms: depression, distractibility  · Why chosen therapy is appropriate versus another modality: relevant to diagnosis  · Outcome monitoring methods: self-report  · Therapeutic intervention type: insight oriented psychotherapy    Risk parameters:  Patient reports no suicidal ideation  Patient reports no homicidal ideation  Patient reports no self-injurious behavior  Patient reports no violent behavior    Verbal deficits: None    Patient's response to intervention:  The patient's response to intervention is motivated    Progress toward goals and other mental status changes:  The patient's progress toward goals is fair    Diagnosis: Major " depression, recurrent, in partial remission,  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

## 2020-07-08 ENCOUNTER — OFFICE VISIT (OUTPATIENT)
Dept: UROGYNECOLOGY | Facility: CLINIC | Age: 70
End: 2020-07-08
Payer: MEDICARE

## 2020-07-08 VITALS
DIASTOLIC BLOOD PRESSURE: 68 MMHG | BODY MASS INDEX: 23.64 KG/M2 | WEIGHT: 156 LBS | SYSTOLIC BLOOD PRESSURE: 120 MMHG | HEIGHT: 68 IN

## 2020-07-08 DIAGNOSIS — Z12.31 ENCOUNTER FOR SCREENING MAMMOGRAM FOR BREAST CANCER: ICD-10-CM

## 2020-07-08 DIAGNOSIS — N95.2 VAGINAL ATROPHY: ICD-10-CM

## 2020-07-08 DIAGNOSIS — R35.0 URINARY FREQUENCY: Primary | ICD-10-CM

## 2020-07-08 PROCEDURE — 1159F MED LIST DOCD IN RCRD: CPT | Mod: HCNC,S$GLB,, | Performed by: PHYSICIAN ASSISTANT

## 2020-07-08 PROCEDURE — 1159F PR MEDICATION LIST DOCUMENTED IN MEDICAL RECORD: ICD-10-PCS | Mod: HCNC,S$GLB,, | Performed by: PHYSICIAN ASSISTANT

## 2020-07-08 PROCEDURE — 3078F PR MOST RECENT DIASTOLIC BLOOD PRESSURE < 80 MM HG: ICD-10-PCS | Mod: HCNC,CPTII,S$GLB, | Performed by: PHYSICIAN ASSISTANT

## 2020-07-08 PROCEDURE — 3078F DIAST BP <80 MM HG: CPT | Mod: HCNC,CPTII,S$GLB, | Performed by: PHYSICIAN ASSISTANT

## 2020-07-08 PROCEDURE — 3074F SYST BP LT 130 MM HG: CPT | Mod: HCNC,CPTII,S$GLB, | Performed by: PHYSICIAN ASSISTANT

## 2020-07-08 PROCEDURE — 99999 PR PBB SHADOW E&M-EST. PATIENT-LVL III: ICD-10-PCS | Mod: PBBFAC,HCNC,, | Performed by: PHYSICIAN ASSISTANT

## 2020-07-08 PROCEDURE — 99999 PR PBB SHADOW E&M-EST. PATIENT-LVL III: CPT | Mod: PBBFAC,HCNC,, | Performed by: PHYSICIAN ASSISTANT

## 2020-07-08 PROCEDURE — 1101F PT FALLS ASSESS-DOCD LE1/YR: CPT | Mod: HCNC,CPTII,S$GLB, | Performed by: PHYSICIAN ASSISTANT

## 2020-07-08 PROCEDURE — 99212 OFFICE O/P EST SF 10 MIN: CPT | Mod: HCNC,S$GLB,, | Performed by: PHYSICIAN ASSISTANT

## 2020-07-08 PROCEDURE — 3074F PR MOST RECENT SYSTOLIC BLOOD PRESSURE < 130 MM HG: ICD-10-PCS | Mod: HCNC,CPTII,S$GLB, | Performed by: PHYSICIAN ASSISTANT

## 2020-07-08 PROCEDURE — 1101F PR PT FALLS ASSESS DOC 0-1 FALLS W/OUT INJ PAST YR: ICD-10-PCS | Mod: HCNC,CPTII,S$GLB, | Performed by: PHYSICIAN ASSISTANT

## 2020-07-08 PROCEDURE — 3008F PR BODY MASS INDEX (BMI) DOCUMENTED: ICD-10-PCS | Mod: HCNC,CPTII,S$GLB, | Performed by: PHYSICIAN ASSISTANT

## 2020-07-08 PROCEDURE — 99212 PR OFFICE/OUTPT VISIT, EST, LEVL II, 10-19 MIN: ICD-10-PCS | Mod: HCNC,S$GLB,, | Performed by: PHYSICIAN ASSISTANT

## 2020-07-08 PROCEDURE — 3008F BODY MASS INDEX DOCD: CPT | Mod: HCNC,CPTII,S$GLB, | Performed by: PHYSICIAN ASSISTANT

## 2020-07-08 NOTE — PROGRESS NOTES
[facility] UROGYNECOLOGY-[id number]   [address] [address]  2020     Manisha Echevarria Sherrell  [id number]  1950    UROGYN FOLLOW-UP  2020     69 y.o. female,   presents for urogyn follow up. She c/o   Chief Complaint   Patient presents with    Follow-up    .     Last HPI from 20:  1)  UI:  (--) BRIAN (occasional) <  (+) UUI  X 2years. Usually worst in the morning. Goes to bed around 11, gets up around 1 to urinate, then has to urinate first thing in the am or she will leak when she gets out of bed. (+) pads 0- 1/day, only wears one when she travels, usually minimum wetness and 0/night usually minimum wetness.  Daytime frequency: Q 2 hours.  Nocturia: Yes: 2/night.   (--) dysuria,  (--) hematuria,  (--) frequent UTIs.  (+) incomplete bladder emptying. Will sit on toilet and wait for another urge.      2)  POP:  Absent. above introitus.  Symptoms:(--).  (--) vaginal bleeding. (--) vaginal discharge. (--) sexually active.  (--) dyspareunia.  (--)  Vaginal dryness.  (--) vaginal estrogen use.      3)  BM:  (--) constipation/straining.  (--) chronic diarrhea. (--) hematochezia.  (--) fecal incontinence.  (--) fecal smearing/urgency.  (--) incomplete evacuation.    Changes from last visit:  1)  UI: UUI improving, no longer wearing pads during the day  2) Urinary frequency: Improving, has set alarm on her phone to remind her to urinate.  3) Nocturia: Improving. Has made it through the night without waking up several nights  4) Incomplete bladder emptying: Improving      Past Medical History:   Diagnosis Date    Abnormal Pap smear     Atrial fibrillation     AV block, 1st degree 2012    Cataract     Depression 2012    Facet arthritis of lumbar region 3/31/2015    Falls     3 falls in the last 6 mos--noted 19    Hyperlipidemia     Hypertension 2012    Neuropathy     Other specified cardiac dysrhythmias(427.89)     Sleep apnea      Syncope 7/24/2012       Past Surgical History:   Procedure Laterality Date    APPLICATION OF CARTILAGE GRAFT Bilateral 12/19/2019    Procedure: APPLICATION, CARTILAGE GRAFT AURICULAR JEZ;  Surgeon: Martinez Flores III, MD;  Location: [facility];  Service: ENT;  Laterality: Bilateral;    CARDIAC PACEMAKER PLACEMENT  09/07/2012    [id number]    DILATION AND CURETTAGE OF UTERUS      Hx of precancerous cells?    ENDOSCOPIC ULTRASOUND OF UPPER GASTROINTESTINAL TRACT N/A 7/5/2019    Procedure: ULTRASOUND, UPPER GI TRACT, ENDOSCOPIC;  Surgeon: Kev Calderon MD;  [facility]  Service: Endoscopy;  Laterality: N/A;  Pacemaker-Adam   appt confirmed-rb    NASAL SEPTOPLASTY N/A 12/19/2019    Procedure: SEPTOPLASTY, NOSE;  Surgeon: Martinez Flores III, MD;  Location: [facility];  Service: ENT;  Laterality: N/A;  FOLLOW DR SALVATORE KIMBROUGH PROTOCOL    OPEN REDUCTION AND INTERNAL FIXATION (ORIF) OF FRACTURE OF DISTAL RADIUS Left 1/24/2020    Procedure: ORIF, FRACTURE, RADIUS, DISTAL-left;  Surgeon: Ellen Moe MD;  Location: [facility];  Service: Orthopedics;  Laterality: Left;    SURGICAL REMOVAL OF NASAL TURBINATE Bilateral 12/19/2019    Procedure: EXCISION, NASAL TURBINATE;  Surgeon: Martinez Flores III, MD;  Location: [facility];  Service: ENT;  Laterality: Bilateral;    TONSILLECTOMY      WISDOM TOOTH EXTRACTION         Family History   Adopted: Yes   Problem Relation Age of Onset    Amblyopia Neg Hx     Blindness Neg Hx     Cataracts Neg Hx     Glaucoma Neg Hx     Macular degeneration Neg Hx     Retinal detachment Neg Hx        Social History     Socioeconomic History    Marital status: Single     Spouse name: Not on file    Number of children: Not on file    Years of education: Not on file    Highest education level: Not on file   Occupational History    Occupation: /Rabbi     Employer: [employer]   Social Needs    Financial resource strain:  Somewhat hard    Food insecurity     Worry: Never true     Inability: Never true    Transportation needs     Medical: No     Non-medical: Yes   Tobacco Use    Smoking status: Former Smoker     Packs/day: 0.25     Years: 15.00     Pack years: 3.75     Quit date: 1982     Years since quittin.9    Smokeless tobacco: Never Used   Substance and Sexual Activity    Alcohol use: Yes     Frequency: 2-4 times a month     Drinks per session: 1 or 2     Binge frequency: Never     Comment: occ    Drug use: No    Sexual activity: Not Currently   Lifestyle    Physical activity     Days per week: 1 day     Minutes per session: 20 min    Stress: Only a little   Relationships    Social connections     Talks on phone: More than three times a week     Gets together: Once a week     Attends [employer] service: 1 to 4 times per year     Active member of club or organization: Yes     Attends meetings of clubs or organizations: 1 to 4 times per year     Relationship status:    Other Topics Concern    Patient feels they ought to cut down on drinking/drug use Not Asked    Patient annoyed by others criticizing their drinking/drug use Not Asked    Patient has felt bad or guilty about drinking/drug use Not Asked    Patient has had a drink/used drugs as an eye opener in the AM Not Asked   Social History Narrative    Not on file       Current Outpatient Medications   Medication Sig Dispense Refill    aspirin (ECOTRIN) 81 MG EC tablet Take 81 mg by mouth. 1 Tablet, Delayed Release (E.C.) Oral Every day      Baclofen 4% Diazepam 5mg Lidocaine 2% Vag Supp Place one suppository vaginally, 30 mins before PT. 18.14 g 0    buPROPion (WELLBUTRIN XL) 300 MG 24 hr tablet Take 300 mg by mouth once daily.       calcium-vitamin D 600 mg(1,500mg) -200 unit Tab Take 1 tablet by mouth once daily.       cholecalciferol, vitamin D3, (VITAMIN D3) 5,000 unit Tab Take 5,000 Units by mouth once daily.      ferrous sulfate (FEOSOL)  "325 mg (65 mg iron) Tab tablet Take 325 mg by mouth daily with breakfast. Taking two times daily      flu vacc [id number],65yr up,PF (FLUZONE HIGH-DOSE , PF,) 180 mcg/0.5 mL Syrg admin as directed 0.5 mL 0    FLUoxetine 40 MG capsule Take 40 mg by mouth once daily.      glycerin/min oil/polycarbophil (REPLENS VAGL) Place vaginally.      lisdexamfetamine (VYVANSE) 70 MG capsule Take 70 mg by mouth every morning.      losartan (COZAAR) 25 MG tablet TAKE 1 TABLET EVERY DAY 90 tablet 6    metoprolol tartrate (LOPRESSOR) 25 MG tablet TAKE 1 TABLET (25 MG TOTAL) BY MOUTH 2 (TWO) TIMES DAILY. 180 tablet 3    multivitamin-minerals-lutein (CENTRUM SILVER) Tab Take by mouth. 1 Tablet Oral Every day      pravastatin (PRAVACHOL) 10 MG tablet TAKE 1 TABLET (10 MG TOTAL) BY MOUTH ONCE DAILY. 90 tablet 3    estradioL (ESTRACE) 0.01 % (0.1 mg/gram) vaginal cream Place 1 g vaginally twice a week. (Patient not taking: Reported on 2020) 1 Tube 1     No current facility-administered medications for this visit.        Review of patient's allergies indicates:  No Known Allergies    OB History        2    Para   1    Term   1            AB   1    Living           SAB        TAB        Ectopic        Multiple        Live Births                      ROS  As per HPI.      Physical Exam  /68   Ht 5' 8" (1.727 m)   Wt 70.8 kg (156 lb)   LMP 10/01/2003   BMI 23.72 kg/m²   General: alert and oriented, no acute distress  Respiratory: normal respiratory effort  Abd: soft, non-tender, non-distended  Pelvic:  Pelvic exam deferred    Impression  No diagnosis found.  We reviewed the above issues and discussed options for short-term versus long-term management of her problems.     Plan:   1)  Mixed urinary incontinence, urge > stress:    -- Empty bladder every 3 hours.  Empty well: wait a minute, lean forward on toilet.  -- Drink more water and avoid bladder irritants  -- Avoid dietary irritants (see sheet).  " Keep diary x 3-5 days to determine your irritants.  --KEGELS: do 10 in AM and 10 in PM, holding each x 10 seconds.  When you feel urge to go, STOP, KEGEL, and when urge has passed, then go to bathroom.   -- Refer to pelvic floor PT. Re-start with [facility] pelvic floor PT (Kelli Marino Barbara or Klarissa Rueda): (p) [phone]. **Use vaginal suppositories before PT**  --URGE: consider anticholinergic if behavioral modifications are not sufficient    --STRESS:  Pessary vs. Sling.      2) Vaginal atrophy (dryness):   -- Estrogen cream was $40, patient has opted for OTC Replens.    -- Use REPLENS or REFRESH OTC: 1/2 applicator full in vagina twice a week.       3) Nocturia (nighttime urination):  -- Stop fluids 2 hours before bed.   -- If have leg swelling:  Elevate feet above chest x 1 hour before bed to get excess fluid off.   -- Can also use support hose (knee highs).       4) Constipation/Straining:  -- Controlling constipation may help bladder urgency/leakage and fiber may better control cholesterol and blood glucose.    -- Start daily fiber to help regulate bowel movements.  Take 1 tsp of fiber powder (psyllium or other sugar-free powder).  Mix in 8 oz of water.  Take x 3-5 days.  Then, increase fiber by 1 tsp every 3-5 days until stool is easy to pass.  Stop and continue at that dose.   Do not exceed 6 tsps/day.  May also use over the counter stool softener (Colace) 1-2 x/day if constipated.  AVOID laxatives.    RTC in 6 months or sooner if needed.     20 minutes were spent in face to face time with this patient  100 % of this time was spent in counseling and/or coordination of care    Rogers Nagy PA-C  Division of Female Pelvic Medicine and Reconstructive Surgery  Department of Obstetrics & Gynecology  [facility] [place], [place]

## 2020-07-08 NOTE — PATIENT INSTRUCTIONS
1)  Mixed urinary incontinence, urge > stress:    -- Empty bladder every 3 hours.  Empty well: wait a minute, lean forward on toilet.  -- Drink more water and avoid bladder irritants  -- Avoid dietary irritants (see sheet).  Keep diary x 3-5 days to determine your irritants.  --KEGELS: do 10 in AM and 10 in PM, holding each x 10 seconds.  When you feel urge to go, STOP, KEGEL, and when urge has passed, then go to bathroom.   -- Refer to pelvic floor PT. Re-start with [facility] pelvic floor PT (Erum Barbara or Klarissa Rueda): (p) [phone]. **Use vaginal suppositories before PT as needed**  --URGE: consider anticholinergic if behavioral modifications are not sufficient    --STRESS:  Pessary vs. Sling.      2) Vaginal atrophy (dryness):   -- Estrogen cream was $40, patient has opted for OTC Replens.    -- Use REPLENS or REFRESH OTC: 1/2 applicator full in vagina twice a week.       3) Nocturia (nighttime urination):  -- Stop fluids 2 hours before bed.   -- If have leg swelling:  Elevate feet above chest x 1 hour before bed to get excess fluid off.   -- Can also use support hose (knee highs).       4) Constipation/Straining:  -- Controlling constipation may help bladder urgency/leakage and fiber may better control cholesterol and blood glucose.    -- Start daily fiber to help regulate bowel movements.  Take 1 tsp of fiber powder (psyllium or other sugar-free powder).  Mix in 8 oz of water.  Take x 3-5 days.  Then, increase fiber by 1 tsp every 3-5 days until stool is easy to pass.  Stop and continue at that dose.   Do not exceed 6 tsps/day.  May also use over the counter stool softener (Colace) 1-2 x/day if constipated.  AVOID laxatives.    RTC in 6 months

## 2020-07-20 ENCOUNTER — TELEPHONE (OUTPATIENT)
Dept: ENDOCRINOLOGY | Facility: CLINIC | Age: 70
End: 2020-07-20

## 2020-07-20 ENCOUNTER — OFFICE VISIT (OUTPATIENT)
Dept: PSYCHIATRY | Facility: CLINIC | Age: 70
End: 2020-07-20
Payer: MEDICARE

## 2020-07-20 ENCOUNTER — TELEPHONE (OUTPATIENT)
Dept: UROGYNECOLOGY | Facility: CLINIC | Age: 70
End: 2020-07-20

## 2020-07-20 DIAGNOSIS — F33.41 MAJOR DEPRESSIVE DISORDER, RECURRENT EPISODE, IN PARTIAL REMISSION: Primary | ICD-10-CM

## 2020-07-20 DIAGNOSIS — F98.8 ATTENTION DEFICIT DISORDER (ADD) IN ADULT: ICD-10-CM

## 2020-07-20 PROCEDURE — 90834 PSYTX W PT 45 MINUTES: CPT | Mod: HCNC,95,, | Performed by: SOCIAL WORKER

## 2020-07-20 PROCEDURE — 90834 PR PSYCHOTHERAPY W/PATIENT, 45 MIN: ICD-10-PCS | Mod: HCNC,95,, | Performed by: SOCIAL WORKER

## 2020-07-20 NOTE — PROGRESS NOTES
The patient location is: [place]  The chief complaint leading to consultation is: depression  Visit type: Virtual visit with synchronous audio and video  Total time spent with patient: 45 minutes  Each patient to whom he or she provides medical services by telemedicine is:  (1) informed of the relationship between the physician and patient and the respective role of any other health care provider with respect to management of the patient; and (2) notified that he or she may decline to receive medical services by telemedicine and may withdraw from such care at any time.    Notes: Individual Psychotherapy (PhD/LCSW)    7/20/2020    Site: telemed    Therapeutic Intervention: Met with patient alone.  Outpatient - Insight oriented psychotherapy 45 min - CPT code 45955    Chief complaint/reason for encounter: depression, distractibility     Interval history and content of current session: Pt doing well, recalls more and more positive social encounters as we talk, went to [employer], had interview for voluntering at [facility], met friend for breakfast and another for coffee, participated in online seminar. She has scheduled organizer to come over later this week, which has spurred some unpacking. Mood is good.    Treatment plan:  · Target symptoms: depression, distractibility  · Why chosen therapy is appropriate versus another modality: relevant to diagnosis  · Outcome monitoring methods: self-report  · Therapeutic intervention type: insight oriented psychotherapy    Risk parameters:  Patient reports no suicidal ideation  Patient reports no homicidal ideation  Patient reports no self-injurious behavior  Patient reports no violent behavior    Verbal deficits: None    Patient's response to intervention:  The patient's response to intervention is motivated    Progress toward goals and other mental status changes:  The patient's progress toward goals is good    Diagnosis: Major depression, recurrent, in partial remission,  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

## 2020-07-20 NOTE — TELEPHONE ENCOUNTER
Pt states she will have dental work (finishing up a crown) on tomorrow and will call provider to start prolia injection

## 2020-07-20 NOTE — TELEPHONE ENCOUNTER
----- Message from Cristine iVllarreal MD sent at 7/20/2020  2:11 PM CDT -----  Thank you Rolando Lehman, can you call the patient to see if she finished her dental work so we can start prolia?   ----- Message -----  From: Roberto Mariscal MD  Sent: 7/19/2020   7:30 PM CDT  To: Cristine Villarreal MD, Rogers Nagy, JACE Colunga:  Just wanted to forward this DEXA from 6/2020 to you since you're following her for bone density other endocrinopathies.  Appreciate your help taking care of her :)  Noam,   Roberto

## 2020-07-20 NOTE — TELEPHONE ENCOUNTER
----- Message from Cristine Villarreal MD sent at 7/20/2020  2:11 PM CDT -----  Thank you Rolando Lehman, can you call the patient to see if she finished her dental work so we can start prolia?   ----- Message -----  From: Roberto Mariscal MD  Sent: 7/19/2020   7:30 PM CDT  To: Cristine Villarreal MD, Rogers Nagy, JACE Colunga:  Just wanted to forward this DEXA from 6/2020 to you since you're following her for bone density other endocrinopathies.  Appreciate your help taking care of her :)  Noam,   Roberto

## 2020-07-20 NOTE — TELEPHONE ENCOUNTER
----- Message from Belkis Foreman sent at 7/20/2020  4:37 PM CDT -----  Regarding: Returned Phone Call  Pt called stating her dental work has not been completed     [phone] (home)

## 2020-07-21 ENCOUNTER — LAB VISIT (OUTPATIENT)
Dept: LAB | Facility: HOSPITAL | Age: 70
End: 2020-07-21
Attending: INTERNAL MEDICINE
Payer: MEDICARE

## 2020-07-21 DIAGNOSIS — E78.2 MIXED HYPERLIPIDEMIA: ICD-10-CM

## 2020-07-21 DIAGNOSIS — N18.30 CKD (CHRONIC KIDNEY DISEASE) STAGE 3, GFR 30-59 ML/MIN: ICD-10-CM

## 2020-07-21 LAB
ALBUMIN SERPL BCP-MCNC: 3.9 G/DL (ref 3.5–5.2)
ALP SERPL-CCNC: 67 U/L (ref 55–135)
ALT SERPL W/O P-5'-P-CCNC: 33 U/L (ref 10–44)
AST SERPL-CCNC: 28 U/L (ref 10–40)
BILIRUB DIRECT SERPL-MCNC: 0.3 MG/DL (ref 0.1–0.3)
BILIRUB SERPL-MCNC: 0.9 MG/DL (ref 0.1–1)
CHOLEST SERPL-MCNC: 215 MG/DL (ref 120–199)
CHOLEST/HDLC SERPL: 3.1 {RATIO} (ref 2–5)
HDLC SERPL-MCNC: 70 MG/DL (ref 40–75)
HDLC SERPL: 32.6 % (ref 20–50)
LDLC SERPL CALC-MCNC: 129 MG/DL (ref 63–159)
NONHDLC SERPL-MCNC: 145 MG/DL
PROT SERPL-MCNC: 7 G/DL (ref 6–8.4)
TRIGL SERPL-MCNC: 80 MG/DL (ref 30–150)

## 2020-07-21 PROCEDURE — 80061 LIPID PANEL: CPT | Mod: HCNC

## 2020-07-21 PROCEDURE — 80076 HEPATIC FUNCTION PANEL: CPT | Mod: HCNC

## 2020-07-21 PROCEDURE — 36415 COLL VENOUS BLD VENIPUNCTURE: CPT | Mod: HCNC,PO

## 2020-07-24 ENCOUNTER — PATIENT MESSAGE (OUTPATIENT)
Dept: ENDOCRINOLOGY | Facility: CLINIC | Age: 70
End: 2020-07-24

## 2020-07-27 ENCOUNTER — TELEPHONE (OUTPATIENT)
Dept: CARDIOLOGY | Facility: CLINIC | Age: 70
End: 2020-07-27

## 2020-07-27 DIAGNOSIS — E78.2 MIXED HYPERLIPIDEMIA: Primary | ICD-10-CM

## 2020-07-27 DIAGNOSIS — N18.30 CKD (CHRONIC KIDNEY DISEASE) STAGE 3, GFR 30-59 ML/MIN: ICD-10-CM

## 2020-07-27 DIAGNOSIS — I10 ESSENTIAL HYPERTENSION: ICD-10-CM

## 2020-07-29 ENCOUNTER — PATIENT MESSAGE (OUTPATIENT)
Dept: ENDOCRINOLOGY | Facility: CLINIC | Age: 70
End: 2020-07-29

## 2020-07-30 DIAGNOSIS — M81.8 OTHER OSTEOPOROSIS WITHOUT CURRENT PATHOLOGICAL FRACTURE: Primary | ICD-10-CM

## 2020-07-31 ENCOUNTER — HOSPITAL ENCOUNTER (OUTPATIENT)
Dept: RADIOLOGY | Facility: OTHER | Age: 70
Discharge: HOME | End: 2020-07-31
Attending: PHYSICIAN ASSISTANT
Payer: MEDICARE

## 2020-07-31 ENCOUNTER — OFFICE VISIT (OUTPATIENT)
Dept: ORTHOPEDICS | Facility: CLINIC | Age: 70
End: 2020-07-31
Payer: MEDICARE

## 2020-07-31 VITALS
WEIGHT: 156 LBS | DIASTOLIC BLOOD PRESSURE: 80 MMHG | SYSTOLIC BLOOD PRESSURE: 121 MMHG | HEIGHT: 68 IN | HEART RATE: 69 BPM | BODY MASS INDEX: 23.64 KG/M2
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
## 2020-07-31 DIAGNOSIS — S52.552A OTHER CLOSED EXTRA-ARTICULAR FRACTURE OF DISTAL END OF LEFT RADIUS, INITIAL ENCOUNTER: ICD-10-CM

## 2020-07-31 DIAGNOSIS — S52.552A OTHER CLOSED EXTRA-ARTICULAR FRACTURE OF DISTAL END OF LEFT RADIUS, INITIAL ENCOUNTER: Primary | ICD-10-CM

## 2020-07-31 PROCEDURE — 1125F PR PAIN SEVERITY QUANTIFIED, PAIN PRESENT: ICD-10-PCS | Mod: HCNC,S$GLB,, | Performed by: PHYSICIAN ASSISTANT

## 2020-07-31 PROCEDURE — 73110 XR WRIST COMPLETE 3 VIEWS LEFT: ICD-10-PCS | Mod: 26,HCNC,LT, | Performed by: RADIOLOGY

## 2020-07-31 PROCEDURE — 3008F PR BODY MASS INDEX (BMI) DOCUMENTED: ICD-10-PCS | Mod: HCNC,CPTII,S$GLB, | Performed by: PHYSICIAN ASSISTANT

## 2020-07-31 PROCEDURE — 73130 X-RAY EXAM OF HAND: CPT | Mod: 26,HCNC,LT, | Performed by: RADIOLOGY

## 2020-07-31 PROCEDURE — 73130 XR HAND COMPLETE 3 VIEW LEFT: ICD-10-PCS | Mod: 26,HCNC,LT, | Performed by: RADIOLOGY

## 2020-07-31 PROCEDURE — 3079F DIAST BP 80-89 MM HG: CPT | Mod: HCNC,CPTII,S$GLB, | Performed by: PHYSICIAN ASSISTANT

## 2020-07-31 PROCEDURE — 1159F MED LIST DOCD IN RCRD: CPT | Mod: HCNC,S$GLB,, | Performed by: PHYSICIAN ASSISTANT

## 2020-07-31 PROCEDURE — 73080 X-RAY EXAM OF ELBOW: CPT | Mod: 26,HCNC,LT, | Performed by: RADIOLOGY

## 2020-07-31 PROCEDURE — 73110 X-RAY EXAM OF WRIST: CPT | Mod: TC,HCNC,FY,LT

## 2020-07-31 PROCEDURE — 73130 X-RAY EXAM OF HAND: CPT | Mod: TC,HCNC,FY,LT

## 2020-07-31 PROCEDURE — 3074F PR MOST RECENT SYSTOLIC BLOOD PRESSURE < 130 MM HG: ICD-10-PCS | Mod: HCNC,CPTII,S$GLB, | Performed by: PHYSICIAN ASSISTANT

## 2020-07-31 PROCEDURE — 1125F AMNT PAIN NOTED PAIN PRSNT: CPT | Mod: HCNC,S$GLB,, | Performed by: PHYSICIAN ASSISTANT

## 2020-07-31 PROCEDURE — 99999 PR PBB SHADOW E&M-EST. PATIENT-LVL V: ICD-10-PCS | Mod: PBBFAC,HCNC,, | Performed by: PHYSICIAN ASSISTANT

## 2020-07-31 PROCEDURE — 73080 X-RAY EXAM OF ELBOW: CPT | Mod: TC,HCNC,FY,LT

## 2020-07-31 PROCEDURE — 3079F PR MOST RECENT DIASTOLIC BLOOD PRESSURE 80-89 MM HG: ICD-10-PCS | Mod: HCNC,CPTII,S$GLB, | Performed by: PHYSICIAN ASSISTANT

## 2020-07-31 PROCEDURE — 73080 XR ELBOW COMPLETE 3 VIEW LEFT: ICD-10-PCS | Mod: 26,HCNC,LT, | Performed by: RADIOLOGY

## 2020-07-31 PROCEDURE — 99214 OFFICE O/P EST MOD 30 MIN: CPT | Mod: HCNC,S$GLB,, | Performed by: PHYSICIAN ASSISTANT

## 2020-07-31 PROCEDURE — 73110 X-RAY EXAM OF WRIST: CPT | Mod: 26,HCNC,LT, | Performed by: RADIOLOGY

## 2020-07-31 PROCEDURE — 1159F PR MEDICATION LIST DOCUMENTED IN MEDICAL RECORD: ICD-10-PCS | Mod: HCNC,S$GLB,, | Performed by: PHYSICIAN ASSISTANT

## 2020-07-31 PROCEDURE — 1101F PR PT FALLS ASSESS DOC 0-1 FALLS W/OUT INJ PAST YR: ICD-10-PCS | Mod: HCNC,CPTII,S$GLB, | Performed by: PHYSICIAN ASSISTANT

## 2020-07-31 PROCEDURE — 99214 PR OFFICE/OUTPT VISIT, EST, LEVL IV, 30-39 MIN: ICD-10-PCS | Mod: HCNC,S$GLB,, | Performed by: PHYSICIAN ASSISTANT

## 2020-07-31 PROCEDURE — 3074F SYST BP LT 130 MM HG: CPT | Mod: HCNC,CPTII,S$GLB, | Performed by: PHYSICIAN ASSISTANT

## 2020-07-31 PROCEDURE — 3008F BODY MASS INDEX DOCD: CPT | Mod: HCNC,CPTII,S$GLB, | Performed by: PHYSICIAN ASSISTANT

## 2020-07-31 PROCEDURE — 99999 PR PBB SHADOW E&M-EST. PATIENT-LVL V: CPT | Mod: PBBFAC,HCNC,, | Performed by: PHYSICIAN ASSISTANT

## 2020-07-31 PROCEDURE — 1101F PT FALLS ASSESS-DOCD LE1/YR: CPT | Mod: HCNC,CPTII,S$GLB, | Performed by: PHYSICIAN ASSISTANT

## 2020-07-31 NOTE — PROGRESS NOTES
Subjective:      Patient ID: Manisha Wynne is a 69 y.o. female.    Chief Complaint: Pain of the Left Wrist      HPI  Manisha Wynne is a 69 y.o. female presenting today for evaluation of the left upper extremity. She is status post ORIF left distal radius by Dr. Moe on 1/24/20. She states she was doing well. She did attend a few OT visits but this was interrupted by COVID. She reports a recent fall on 6/26/20. Since this time she has had increased soreness in the wrist, also reports bruising on the elbow and pain in the fingers. She is interested in getting back into therapy.    Review of patient's allergies indicates:  No Known Allergies      Current Outpatient Medications   Medication Sig Dispense Refill    aspirin (ECOTRIN) 81 MG EC tablet Take 81 mg by mouth. 1 Tablet, Delayed Release (E.C.) Oral Every day      Baclofen 4% Diazepam 5mg Lidocaine 2% Vag Supp Place one suppository vaginally, 30 mins before PT. 18.14 g 0    buPROPion (WELLBUTRIN XL) 300 MG 24 hr tablet Take 300 mg by mouth once daily.       calcium-vitamin D 600 mg(1,500mg) -200 unit Tab Take 1 tablet by mouth once daily.       cholecalciferol, vitamin D3, (VITAMIN D3) 5,000 unit Tab Take 5,000 Units by mouth once daily.      ferrous sulfate (FEOSOL) 325 mg (65 mg iron) Tab tablet Take 325 mg by mouth daily with breakfast. Taking two times daily      flu vacc [id number],65yr up,PF (FLUZONE HIGH-DOSE 2019-20, PF,) 180 mcg/0.5 mL Syrg admin as directed 0.5 mL 0    FLUoxetine 40 MG capsule Take 40 mg by mouth once daily.      glycerin/min oil/polycarbophil (REPLENS VAGL) Place vaginally.      lisdexamfetamine (VYVANSE) 70 MG capsule Take 70 mg by mouth every morning.      losartan (COZAAR) 25 MG tablet TAKE 1 TABLET EVERY DAY 90 tablet 6    metoprolol tartrate (LOPRESSOR) 25 MG tablet TAKE 1 TABLET (25 MG TOTAL) BY MOUTH 2 (TWO) TIMES DAILY. 180 tablet 3    multivitamin-minerals-lutein (CENTRUM SILVER) Tab Take by  mouth. 1 Tablet Oral Every day      pravastatin (PRAVACHOL) 10 MG tablet TAKE 1 TABLET (10 MG TOTAL) BY MOUTH ONCE DAILY. (Patient taking differently: Take 10 mg by mouth 2 (two) times daily. ) 90 tablet 3    estradioL (ESTRACE) 0.01 % (0.1 mg/gram) vaginal cream Place 1 g vaginally twice a week. (Patient not taking: Reported on 7/8/2020) 1 Tube 1     No current facility-administered medications for this visit.        Past Medical History:   Diagnosis Date    Abnormal Pap smear     Atrial fibrillation     AV block, 1st degree 7/25/2012    Cataract     Depression 7/24/2012    Facet arthritis of lumbar region 3/31/2015    Falls     3 falls in the last 6 mos--noted 6/19/19    Hyperlipidemia     Hypertension 7/24/2012    Neuropathy     Other specified cardiac dysrhythmias(427.89)     Sleep apnea     Syncope 7/24/2012       Past Surgical History:   Procedure Laterality Date    APPLICATION OF CARTILAGE GRAFT Bilateral 12/19/2019    Procedure: APPLICATION, CARTILAGE GRAFT AURICULAR JEZ;  Surgeon: Martinez Flores III, MD;  Location: [facility];  Service: ENT;  Laterality: Bilateral;    CARDIAC PACEMAKER PLACEMENT  09/07/2012    [id number]    DILATION AND CURETTAGE OF UTERUS      Hx of precancerous cells?    ENDOSCOPIC ULTRASOUND OF UPPER GASTROINTESTINAL TRACT N/A 7/5/2019    Procedure: ULTRASOUND, UPPER GI TRACT, ENDOSCOPIC;  Surgeon: Kev Calderon MD;  Location: [facility] ([address] [place] [address]);  Service: Endoscopy;  Laterality: N/A;  Pacemaker-Adam   appt confirmed-rb    NASAL SEPTOPLASTY N/A 12/19/2019    Procedure: SEPTOPLASTY, NOSE;  Surgeon: Martinez Flores III, MD;  Location: [facility];  Service: ENT;  Laterality: N/A;  FOLLOW DR SALVATORE KIMBROUGH PROTOCOL    OPEN REDUCTION AND INTERNAL FIXATION (ORIF) OF FRACTURE OF DISTAL RADIUS Left 1/24/2020    Procedure: ORIF, FRACTURE, RADIUS, DISTAL-left;  Surgeon: Ellen Moe MD;  Location: [facility];  Service: Orthopedics;  Laterality: Left;     "SURGICAL REMOVAL OF NASAL TURBINATE Bilateral 12/19/2019    Procedure: EXCISION, NASAL TURBINATE;  Surgeon: Martinez Flores III, MD;  Location: [facility];  Service: ENT;  Laterality: Bilateral;    TONSILLECTOMY      WISDOM TOOTH EXTRACTION         Review of Systems:  Constitutional: Negative for chills and fever.   Respiratory: Negative for cough and shortness of breath.    Gastrointestinal: Negative for nausea and vomiting.   Skin: Negative for rash.   Neurological: Negative for dizziness and headaches.   Psychiatric/Behavioral: Negative for depression.   MSK as in HPI       OBJECTIVE:     PHYSICAL EXAM:  /80   Pulse 69   Ht 5' 7.99" (1.727 m)   Wt 70.8 kg (156 lb)   LMP 10/01/2003   BMI 23.73 kg/m²     GEN:  NAD, well-developed, well-groomed.  NEURO: Awake, alert, and oriented. Normal attention and concentration.    PSYCH: Normal mood and affect. Behavior is normal.  HEENT: No cervical lymphadenopathy noted.  CARDIOVASCULAR: Radial pulses 2+ bilaterally. No LE edema noted.  PULMONARY: Breath sounds normal. No respiratory distress.  SKIN: Intact, no rashes.      MSK:   LUE:  Good active ROM of the elbow, wrist, and fingers. Mild ttp at the wrist. Minimal ecchymosis over the volar ring finger and at the elbow. AIN/PIN/Radial/Median/Ulnar Nerves assessed in isolation without deficit. Radial & Ulnar arteries palpated 2+. Capillary Refill <3s.      RADIOGRAPHS:  Xray left elbow 7/31/20  FINDINGS:  No evidence of an acute fracture or dislocation.  No large elbow joint effusion.  Mild osteophytosis at the ulnar aspect of the elbow.  No radiopaque foreign body.    Xray left wrist 7/31/20   FINDINGS:  ORIF of the distal radius with good alignment.  No evidence of a new fracture.  Joint spaces appear relatively well maintained.  No radiopaque foreign bodies.    Xray left hand 7/31/20  FINDINGS:  ORIF of the distal radius with good alignment.  No evidence of a new fracture.  Joint spaces appear relatively well " maintained.  No radiopaque foreign bodies.       Comments: I have personally reviewed the imaging and I agree with the above radiologist's report.    ASSESSMENT/PLAN:       ICD-10-CM ICD-9-CM   1. Other closed extra-articular fracture of distal end of left radius, initial encounter  S52.552A 813.42   2. Fall, initial encounter  W19.XXXA E888.9       Orders Placed This Encounter    X-Ray Wrist Complete Left    X-Ray Hand Complete Left    X-Ray Elbow Complete Left    Ambulatory referral/consult to Physical/Occupational Therapy     Plan:   -new xrays today- no new fracture, well healed distal radius fracture   -OT orders placed  -RTC prn       The patient indicates understanding of these issues and agrees to the plan.    Sarah Stapleton PA-C  [facility]   [facility] [place]

## 2020-08-03 ENCOUNTER — OFFICE VISIT (OUTPATIENT)
Dept: PSYCHIATRY | Facility: CLINIC | Age: 70
End: 2020-08-03
Payer: MEDICARE

## 2020-08-03 DIAGNOSIS — F98.8 ATTENTION DEFICIT DISORDER (ADD) IN ADULT: ICD-10-CM

## 2020-08-03 DIAGNOSIS — F33.41 MAJOR DEPRESSIVE DISORDER, RECURRENT EPISODE, IN PARTIAL REMISSION: Primary | ICD-10-CM

## 2020-08-03 PROCEDURE — 90834 PSYTX W PT 45 MINUTES: CPT | Mod: HCNC,95,, | Performed by: SOCIAL WORKER

## 2020-08-03 PROCEDURE — 90834 PR PSYCHOTHERAPY W/PATIENT, 45 MIN: ICD-10-PCS | Mod: HCNC,95,, | Performed by: SOCIAL WORKER

## 2020-08-03 NOTE — PROGRESS NOTES
The patient location is: [place]  The chief complaint leading to consultation is: depression  Visit type: Virtual visit with synchronous audio and video  Total time spent with patient: 45 minutes  Each patient to whom he or she provides medical services by telemedicine is:  (1) informed of the relationship between the physician and patient and the respective role of any other health care provider with respect to management of the patient; and (2) notified that he or she may decline to receive medical services by telemedicine and may withdraw from such care at any time.    Notes: Individual Psychotherapy (PhD/LCSW)    8/3/2020    Site: telemed    Therapeutic Intervention: Met with patient alone.  Outpatient - Insight oriented psychotherapy 45 min - CPT code 59384    Chief complaint/reason for encounter: depression, distractibility     Interval history and content of current session: Pt 15 minutes late, forgot to set alarm, does session from her bed. Discuss experience with organizer and ways to maximize benefit by thinking ahead about what pt needs from her. Discuss pt's existential depression related to feeling she should have done more with her life, invite pt to value the things she has achieved and her relationships.    Treatment plan:  · Target symptoms: depression, distractibility  · Why chosen therapy is appropriate versus another modality: relevant to diagnosis  · Outcome monitoring methods: self-report  · Therapeutic intervention type: insight oriented psychotherapy    Risk parameters:  Patient reports no suicidal ideation  Patient reports no homicidal ideation  Patient reports no self-injurious behavior  Patient reports no violent behavior    Verbal deficits: None    Patient's response to intervention:  The patient's response to intervention is motivated    Progress toward goals and other mental status changes:  The patient's progress toward goals is fair    Diagnosis: Major depression, recurrent, in  partial remission,  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

## 2020-08-07 ENCOUNTER — INFUSION (OUTPATIENT)
Dept: INFECTIOUS DISEASES | Facility: HOSPITAL | Age: 70
End: 2020-08-07
Attending: INTERNAL MEDICINE
Payer: MEDICARE

## 2020-08-07 VITALS
BODY MASS INDEX: 23.65 KG/M2 | SYSTOLIC BLOOD PRESSURE: 123 MMHG | HEIGHT: 68 IN | DIASTOLIC BLOOD PRESSURE: 78 MMHG | HEART RATE: 67 BPM | WEIGHT: 156.06 LBS

## 2020-08-07 DIAGNOSIS — N18.30 CKD (CHRONIC KIDNEY DISEASE) STAGE 3, GFR 30-59 ML/MIN: ICD-10-CM

## 2020-08-07 DIAGNOSIS — M81.8 OTHER OSTEOPOROSIS WITHOUT CURRENT PATHOLOGICAL FRACTURE: Primary | ICD-10-CM

## 2020-08-07 PROCEDURE — 96372 THER/PROPH/DIAG INJ SC/IM: CPT | Mod: HCNC

## 2020-08-07 PROCEDURE — 63600175 PHARM REV CODE 636 W HCPCS: Mod: JG,HCNC | Performed by: INTERNAL MEDICINE

## 2020-08-07 RX ADMIN — DENOSUMAB 60 MG: 60 INJECTION SUBCUTANEOUS at 11:08

## 2020-08-07 NOTE — PROGRESS NOTES
Patient received Prolia 60 mg injection sub Q in the left arm.  Tolerated well and left in NAD.    Patient is taking Vit D

## 2020-08-12 ENCOUNTER — DOCUMENTATION ONLY (OUTPATIENT)
Dept: REHABILITATION | Facility: OTHER | Age: 70
End: 2020-08-12

## 2020-08-12 PROBLEM — M62.89 PELVIC FLOOR DYSFUNCTION: Status: RESOLVED | Noted: 2020-06-10 | Resolved: 2020-08-12

## 2020-08-12 PROBLEM — R27.8 OTHER LACK OF COORDINATION: Status: RESOLVED | Noted: 2020-06-10 | Resolved: 2020-08-12

## 2020-08-12 PROBLEM — M62.81 MUSCLE WEAKNESS: Status: RESOLVED | Noted: 2020-06-10 | Resolved: 2020-08-12

## 2020-08-12 NOTE — PROGRESS NOTES
"  PHYSICAL THERAPY DISCHARGE SUMMARY     Name: Manisha Wynne  Clinic Number: [id number]    Diagnosis: urinary incontinence  Physician: No ref. provider found  Treatment Orders: PT Eval and Treat  Past Medical History:   Diagnosis Date    Abnormal Pap smear     Atrial fibrillation     AV block, 1st degree 7/25/2012    Cataract     Depression 7/24/2012    Facet arthritis of lumbar region 3/31/2015    Falls     3 falls in the last 6 mos--noted 6/19/19    Hyperlipidemia     Hypertension 7/24/2012    Neuropathy     Other specified cardiac dysrhythmias(427.89)     Sleep apnea     Syncope 7/24/2012       Initial visit: 6-  Date of Last visit: 6-  Total Visits Received: 1  Missed Visits: 0  ASSESSMENT   Status Towards Goals Met:  Not met     Short Term Goals: 4 weeks   Pt to perform "the knack" prior to coughing, laughing or sneezing to decrease risk of incontinence.  Pt to demonstrate being able to correctly and consistently perform a kegel which is needed  to increase pelvic floor muscle coordination and strength needed for continence.  Pt to be able to delay the urge to urinate at least 5 minutes with a strong urge to urinate in order to make it to the bathroom without leaking.  Pt to voice understanding of the role that diet plays on urinary urgency.    Pt to demonstrate proper diaphragmatic breathing to help with calming the nervous system in order to decrease pain and to improve abdominal wall musculature extensibility.   Pt will report minimal to no pain with single digit pelvic assessment and display relaxation during this assessment in order to progress to dilator use.     Long Term Goals: 12 weeks   Pt to be discharged with home plan for carry over after discharge.    Pt to no longer need to wear a pad for protection due to reduction of urinary incontinence by at least 85% with ADLs.  Pt to be able to delay the urge to urinate at least 10 minutes with a strong urge to urinate in " order to make it to the bathroom without leaking.  Pt to report being able to have a comfortable vaginal exam without significant increase in pelvic pain.    Goals Not achieved and why:   Pt has not scheduled any additional visits and has not returned to therapy.     Discharge reason : Pt has not re-scheduled further follow-up sessions      PLAN   This patient is discharged from [facility] Therapy Services.         Kelli Jimenez, PT   8/12/2020

## 2020-08-17 ENCOUNTER — OFFICE VISIT (OUTPATIENT)
Dept: PSYCHIATRY | Facility: CLINIC | Age: 70
End: 2020-08-17
Payer: MEDICARE

## 2020-08-17 DIAGNOSIS — F33.41 MAJOR DEPRESSIVE DISORDER, RECURRENT EPISODE, IN PARTIAL REMISSION: Primary | ICD-10-CM

## 2020-08-17 DIAGNOSIS — F98.8 ATTENTION DEFICIT DISORDER (ADD) IN ADULT: ICD-10-CM

## 2020-08-17 PROCEDURE — 90834 PR PSYCHOTHERAPY W/PATIENT, 45 MIN: ICD-10-PCS | Mod: HCNC,95,, | Performed by: SOCIAL WORKER

## 2020-08-17 PROCEDURE — 90834 PSYTX W PT 45 MINUTES: CPT | Mod: HCNC,95,, | Performed by: SOCIAL WORKER

## 2020-08-17 NOTE — PROGRESS NOTES
The patient location is: [place]  The chief complaint leading to consultation is: depression  Visit type: Virtual visit with synchronous audio and video  Total time spent with patient: 45 minutes  Each patient to whom he or she provides medical services by telemedicine is:  (1) informed of the relationship between the physician and patient and the respective role of any other health care provider with respect to management of the patient; and (2) notified that he or she may decline to receive medical services by telemedicine and may withdraw from such care at any time.    Notes: Individual Psychotherapy (PhD/LCSW)    8/17/2020    Site: telemed    Therapeutic Intervention: Met with patient alone.  Outpatient - Insight oriented psychotherapy 45 min - CPT code 43502    Chief complaint/reason for encounter: depression, distractibility     Interval history and content of current session: Pt on time. She is making some progress in clearing out her storage unit, getting rid of things she no longer needs, and finding places for things she is keeping. Help pt increase awareness of her shift from intention to action in many areas. She still gets bogged down, overwhelmed, and has trouble getting out of bed. Having hired someone to come in and help her does make a positive difference.     Treatment plan:  · Target symptoms: depression, distractibility  · Why chosen therapy is appropriate versus another modality: relevant to diagnosis  · Outcome monitoring methods: self-report  · Therapeutic intervention type: insight oriented psychotherapy    Risk parameters:  Patient reports no suicidal ideation  Patient reports no homicidal ideation  Patient reports no self-injurious behavior  Patient reports no violent behavior    Verbal deficits: None    Patient's response to intervention:  The patient's response to intervention is motivated    Progress toward goals and other mental status changes:  The patient's progress toward goals is  fair    Diagnosis: Major depression, recurrent, in partial remission,  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

## 2020-08-25 ENCOUNTER — OFFICE VISIT (OUTPATIENT)
Dept: OPTOMETRY | Facility: CLINIC | Age: 70
End: 2020-08-25
Payer: COMMERCIAL

## 2020-08-25 DIAGNOSIS — Z01.00 EXAMINATION OF EYES AND VISION: Primary | ICD-10-CM

## 2020-08-25 DIAGNOSIS — H25.13 NUCLEAR SCLEROSIS, BILATERAL: ICD-10-CM

## 2020-08-25 DIAGNOSIS — H52.4 PRESBYOPIA: ICD-10-CM

## 2020-08-25 PROCEDURE — 99999 PR PBB SHADOW E&M-EST. PATIENT-LVL II: CPT | Mod: PBBFAC,HCNC,, | Performed by: OPTOMETRIST

## 2020-08-25 PROCEDURE — 92014 COMPRE OPH EXAM EST PT 1/>: CPT | Mod: HCNC,S$GLB,, | Performed by: OPTOMETRIST

## 2020-08-25 PROCEDURE — 99999 PR PBB SHADOW E&M-EST. PATIENT-LVL II: ICD-10-PCS | Mod: PBBFAC,HCNC,, | Performed by: OPTOMETRIST

## 2020-08-25 PROCEDURE — 92014 PR EYE EXAM, EST PATIENT,COMPREHESV: ICD-10-PCS | Mod: HCNC,S$GLB,, | Performed by: OPTOMETRIST

## 2020-08-25 NOTE — PROGRESS NOTES
HPI     Last eye exam was 6/17/19 with   Pt here for routine eye exam.    Pt states that she feels like she needs a lot of light to be able to see.  Pt states she feels like she is having a hard time seeing and not sure if   it is the weather or not.   Patient denies diplopia, headaches, flashes/floaters, and pain.  systane PRN OU, and no eye sx.       Last edited by Tiny Waller on 8/25/2020  9:55 AM. (History)            Assessment /Plan     For exam results, see Encounter Report.    Examination of eyes and vision    Presbyopia    Nuclear sclerosis, bilateral            1-2.   Continue w/ current rx.  Eye health normal OU.  3.  Educated on cataracts and affects on vision.  Patient ok with vision for now.  Monitor.

## 2020-08-31 ENCOUNTER — OFFICE VISIT (OUTPATIENT)
Dept: PSYCHIATRY | Facility: CLINIC | Age: 70
End: 2020-08-31
Payer: MEDICARE

## 2020-08-31 DIAGNOSIS — F33.41 MAJOR DEPRESSIVE DISORDER, RECURRENT EPISODE, IN PARTIAL REMISSION: Primary | ICD-10-CM

## 2020-08-31 DIAGNOSIS — F98.8 ATTENTION DEFICIT DISORDER (ADD) IN ADULT: ICD-10-CM

## 2020-08-31 PROCEDURE — 90834 PR PSYCHOTHERAPY W/PATIENT, 45 MIN: ICD-10-PCS | Mod: HCNC,95,, | Performed by: SOCIAL WORKER

## 2020-08-31 PROCEDURE — 90834 PSYTX W PT 45 MINUTES: CPT | Mod: HCNC,95,, | Performed by: SOCIAL WORKER

## 2020-08-31 NOTE — PROGRESS NOTES
The patient location is: [place]  The chief complaint leading to consultation is: depression  Visit type: Virtual visit with synchronous audio and video  Total time spent with patient: 45 minutes  Each patient to whom he or she provides medical services by telemedicine is:  (1) informed of the relationship between the physician and patient and the respective role of any other health care provider with respect to management of the patient; and (2) notified that he or she may decline to receive medical services by telemedicine and may withdraw from such care at any time.    Notes: Individual Psychotherapy (PhD/LCSW)    8/31/2020    Site: telemed    Therapeutic Intervention: Met with patient alone.  Outpatient - Insight oriented psychotherapy 45 min - CPT code 66762    Chief complaint/reason for encounter: depression, distractibility     Interval history and content of current session: Pt doing well, continues making slow progress in sorting through her boxes and getting rid of things she doesn't need. 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 Mood is good. Discuss avoidance of boring tasks. Discuss possible interest in a job, and issues to consider around this based on hx.    Treatment plan:  · Target symptoms: depression, distractibility  · Why chosen therapy is appropriate versus another modality: relevant to diagnosis  · Outcome monitoring methods: self-report  · Therapeutic intervention type: insight oriented psychotherapy    Risk parameters:  Patient reports no suicidal ideation  Patient reports no homicidal ideation  Patient reports no self-injurious behavior  Patient reports no violent behavior    Verbal deficits: None    Patient's response to intervention:  The patient's response to intervention is motivated    Progress toward goals and other mental status changes:  The patient's progress toward goals is  good    Diagnosis: Major depression, recurrent, in partial remission,  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

## 2020-09-08 ENCOUNTER — TELEPHONE (OUTPATIENT)
Dept: CARDIOLOGY | Facility: CLINIC | Age: 70
End: 2020-09-08

## 2020-09-08 NOTE — TELEPHONE ENCOUNTER
----- Message from Carol Amador MA sent at 8/3/2020  3:07 PM CDT -----  Does pt need Pravastatin 20mg sent in?

## 2020-09-14 ENCOUNTER — PATIENT OUTREACH (OUTPATIENT)
Dept: ADMINISTRATIVE | Facility: OTHER | Age: 70
End: 2020-09-14

## 2020-09-14 ENCOUNTER — OFFICE VISIT (OUTPATIENT)
Dept: PSYCHIATRY | Facility: CLINIC | Age: 70
End: 2020-09-14
Payer: MEDICARE

## 2020-09-14 DIAGNOSIS — F33.41 MAJOR DEPRESSIVE DISORDER, RECURRENT EPISODE, IN PARTIAL REMISSION: Primary | ICD-10-CM

## 2020-09-14 DIAGNOSIS — F98.8 ATTENTION DEFICIT DISORDER (ADD) IN ADULT: ICD-10-CM

## 2020-09-14 PROCEDURE — 90834 PSYTX W PT 45 MINUTES: CPT | Mod: HCNC,95,, | Performed by: SOCIAL WORKER

## 2020-09-14 PROCEDURE — 90834 PR PSYCHOTHERAPY W/PATIENT, 45 MIN: ICD-10-PCS | Mod: HCNC,95,, | Performed by: SOCIAL WORKER

## 2020-09-14 NOTE — PROGRESS NOTES
The patient location is: [place]  The chief complaint leading to consultation is: depression  Visit type: Virtual visit with synchronous audio and video  Total time spent with patient: 45 minutes  Each patient to whom he or she provides medical services by telemedicine is:  (1) informed of the relationship between the physician and patient and the respective role of any other health care provider with respect to management of the patient; and (2) notified that he or she may decline to receive medical services by telemedicine and may withdraw from such care at any time.    Notes: Individual Psychotherapy (PhD/LCSW)    9/14/2020    Site: telemed    Therapeutic Intervention: Met with patient alone.  Outpatient - Insight oriented psychotherapy 45 min - CPT code 63045    Chief complaint/reason for encounter: depression, distractibility     Interval history and content of current session: Pt upbeat today, [birth year] birthday. Friends have organized a zoom party and ordered things to be delivered to her from [employer], pt is excited. Discuss small areas of progress in straightening and organizing her house. Help pt identify areas where she is stuck and ways to push through.    Treatment plan:  · Target symptoms: depression, distractibility  · Why chosen therapy is appropriate versus another modality: relevant to diagnosis  · Outcome monitoring methods: self-report  · Therapeutic intervention type: insight oriented psychotherapy    Risk parameters:  Patient reports no suicidal ideation  Patient reports no homicidal ideation  Patient reports no self-injurious behavior  Patient reports no violent behavior    Verbal deficits: None    Patient's response to intervention:  The patient's response to intervention is motivated    Progress toward goals and other mental status changes:  The patient's progress toward goals is good    Diagnosis: Major depression, recurrent, in partial remission,  ADD in adult     Plan:  individual  psychotherapy    Return to clinic: f/u as scheduled

## 2020-09-14 NOTE — PROGRESS NOTES
LINKS immunization registry updated  Care Everywhere updated  Health Maintenance updated  Chart reviewed for overdue Proactive [facility] Encounters (MICHAEL) health maintenance testing (CRS, Breast Ca, Diabetic Eye Exam)   Orders entered:N/A

## 2020-09-15 ENCOUNTER — CLINICAL SUPPORT (OUTPATIENT)
Dept: AUDIOLOGY | Facility: CLINIC | Age: 70
End: 2020-09-15
Payer: MEDICARE

## 2020-09-15 ENCOUNTER — OFFICE VISIT (OUTPATIENT)
Dept: OTOLARYNGOLOGY | Facility: CLINIC | Age: 70
End: 2020-09-15
Payer: MEDICARE

## 2020-09-15 VITALS — BODY MASS INDEX: 23.64 KG/M2 | WEIGHT: 155 LBS

## 2020-09-15 DIAGNOSIS — H90.3 SENSORINEURAL HEARING LOSS (SNHL) OF BOTH EARS: ICD-10-CM

## 2020-09-15 DIAGNOSIS — H90.3 SENSORINEURAL HEARING LOSS, BILATERAL: Primary | ICD-10-CM

## 2020-09-15 DIAGNOSIS — H61.23 CERUMEN DEBRIS ON TYMPANIC MEMBRANE OF BOTH EARS: ICD-10-CM

## 2020-09-15 PROCEDURE — 1159F PR MEDICATION LIST DOCUMENTED IN MEDICAL RECORD: ICD-10-PCS | Mod: HCNC,S$GLB,, | Performed by: OTOLARYNGOLOGY

## 2020-09-15 PROCEDURE — 99999 PR PBB SHADOW E&M-EST. PATIENT-LVL I: CPT | Mod: PBBFAC,HCNC,,

## 2020-09-15 PROCEDURE — 99999 PR PBB SHADOW E&M-EST. PATIENT-LVL III: CPT | Mod: PBBFAC,HCNC,, | Performed by: OTOLARYNGOLOGY

## 2020-09-15 PROCEDURE — G0268 PR REMOVAL OF IMPACTED WAX MD: ICD-10-PCS | Mod: HCNC,S$GLB,, | Performed by: OTOLARYNGOLOGY

## 2020-09-15 PROCEDURE — 99213 OFFICE O/P EST LOW 20 MIN: CPT | Mod: 25,HCNC,S$GLB, | Performed by: OTOLARYNGOLOGY

## 2020-09-15 PROCEDURE — 99999 PR PBB SHADOW E&M-EST. PATIENT-LVL I: ICD-10-PCS | Mod: PBBFAC,HCNC,,

## 2020-09-15 PROCEDURE — 3008F PR BODY MASS INDEX (BMI) DOCUMENTED: ICD-10-PCS | Mod: HCNC,CPTII,S$GLB, | Performed by: OTOLARYNGOLOGY

## 2020-09-15 PROCEDURE — 1101F PR PT FALLS ASSESS DOC 0-1 FALLS W/OUT INJ PAST YR: ICD-10-PCS | Mod: HCNC,CPTII,S$GLB, | Performed by: OTOLARYNGOLOGY

## 2020-09-15 PROCEDURE — 99213 PR OFFICE/OUTPT VISIT, EST, LEVL III, 20-29 MIN: ICD-10-PCS | Mod: 25,HCNC,S$GLB, | Performed by: OTOLARYNGOLOGY

## 2020-09-15 PROCEDURE — 1126F PR PAIN SEVERITY QUANTIFIED, NO PAIN PRESENT: ICD-10-PCS | Mod: HCNC,S$GLB,, | Performed by: OTOLARYNGOLOGY

## 2020-09-15 PROCEDURE — 1126F AMNT PAIN NOTED NONE PRSNT: CPT | Mod: HCNC,S$GLB,, | Performed by: OTOLARYNGOLOGY

## 2020-09-15 PROCEDURE — 92557 PR COMPREHENSIVE HEARING TEST: ICD-10-PCS | Mod: HCNC,S$GLB,, | Performed by: AUDIOLOGIST

## 2020-09-15 PROCEDURE — 92557 COMPREHENSIVE HEARING TEST: CPT | Mod: HCNC,S$GLB,, | Performed by: AUDIOLOGIST

## 2020-09-15 PROCEDURE — 3008F BODY MASS INDEX DOCD: CPT | Mod: HCNC,CPTII,S$GLB, | Performed by: OTOLARYNGOLOGY

## 2020-09-15 PROCEDURE — 1101F PT FALLS ASSESS-DOCD LE1/YR: CPT | Mod: HCNC,CPTII,S$GLB, | Performed by: OTOLARYNGOLOGY

## 2020-09-15 PROCEDURE — G0268 REMOVAL OF IMPACTED WAX MD: HCPCS | Mod: HCNC,S$GLB,, | Performed by: OTOLARYNGOLOGY

## 2020-09-15 PROCEDURE — 92567 PR TYMPA2METRY: ICD-10-PCS | Mod: HCNC,S$GLB,, | Performed by: AUDIOLOGIST

## 2020-09-15 PROCEDURE — 92567 TYMPANOMETRY: CPT | Mod: HCNC,S$GLB,, | Performed by: AUDIOLOGIST

## 2020-09-15 PROCEDURE — 1159F MED LIST DOCD IN RCRD: CPT | Mod: HCNC,S$GLB,, | Performed by: OTOLARYNGOLOGY

## 2020-09-15 PROCEDURE — 99999 PR PBB SHADOW E&M-EST. PATIENT-LVL III: ICD-10-PCS | Mod: PBBFAC,HCNC,, | Performed by: OTOLARYNGOLOGY

## 2020-09-15 NOTE — PROGRESS NOTES
Manisha Wynne was seen today in the clinic for an audiologic evaluation.  Patients main complaint was hearing loss.  Her last hearing test was in 2016.    Tympanometry revealed Type A in the right ear and Type A in the left ear.  Audiogram results revealed a mild to moderate SNHL in the right and left ear.  Speech reception thresholds were noted at 20 dB in the right ear and 30 dB in the left ear.  Speech discrimination scores were 92% in the right ear and 92% in the left ear.    Recommendations:  1. Otologic evaluation  2. Annual audiogram  3. Noise protection when in noise  4. Hearing aid consultation

## 2020-09-15 NOTE — PROGRESS NOTES
Otolaryngology-Head & Neck Surgery           History & Physical    CHIEF COMPLAINT:  Cerumen impaction      HISTORY OF PRESENT ILLNESS:  Manisha Wynne is a 70 y.o. female who presents to clinic for evaluation of cerumen impaction and hearing evaluation.    She has had decade of recurrent cerumen impactions that have required cleaning. She has not had this done in a long time. She also reports hearing loss and an itch that makes her want to clean her ears. She states that her hearing seems decreased. She has had an audiogram in the past. She would like to get this evaluated again today.       REVIEW OF SYSTEMS:  Review of Systems   Constitutional: Negative.  Negative for chills, fever and weight loss.   HENT: Positive for ear discharge, hearing loss and tinnitus. Negative for congestion, ear pain and nosebleeds.    Eyes: Negative.  Negative for blurred vision, double vision and photophobia.   Respiratory: Negative for cough, hemoptysis and sputum production.    Cardiovascular: Negative.  Negative for chest pain, palpitations and orthopnea.   Gastrointestinal: Negative.  Negative for abdominal pain, heartburn and nausea.   Genitourinary: Negative for dysuria, frequency and urgency.   Musculoskeletal: Positive for back pain.   Skin: Negative.  Negative for rash.   Neurological: Negative.  Negative for dizziness, tingling and headaches.   Endo/Heme/Allergies: Does not bruise/bleed easily.   Psychiatric/Behavioral: Positive for depression. Negative for hallucinations, substance abuse and suicidal ideas.           MEDICATIONS:   Reviewed and/or reconciled in EPIC    ALLERGIES:  Reviewed and/or reconciled in [facility]    PAST MEDICAL/SURGICAL HISTORY:   Past Medical History:   Diagnosis Date    Abnormal Pap smear     Atrial fibrillation     AV block, 1st degree 7/25/2012    Cataract     Depression 7/24/2012    Facet arthritis of lumbar region 3/31/2015    Falls     3 falls in the last 6 mos--noted 6/19/19     Hyperlipidemia     Hypertension 7/24/2012    Neuropathy     Other specified cardiac dysrhythmias(427.89)     Sleep apnea     Syncope 7/24/2012      Past Surgical History:   Procedure Laterality Date    APPLICATION OF CARTILAGE GRAFT Bilateral 12/19/2019    Procedure: APPLICATION, CARTILAGE GRAFT AURICULAR JEZ;  Surgeon: Martinez Flores III, MD;  Location: [facility];  Service: ENT;  Laterality: Bilateral;    CARDIAC PACEMAKER PLACEMENT  09/07/2012    [id number]    DILATION AND CURETTAGE OF UTERUS      Hx of precancerous cells?    ENDOSCOPIC ULTRASOUND OF UPPER GASTROINTESTINAL TRACT N/A 7/5/2019    Procedure: ULTRASOUND, UPPER GI TRACT, ENDOSCOPIC;  Surgeon: Kev Calderon MD;  Location: [facility] ([address] [place] [address]);  Service: Endoscopy;  Laterality: N/A;  Pacemaker-Adam   appt confirmed-rb    NASAL SEPTOPLASTY N/A 12/19/2019    Procedure: SEPTOPLASTY, NOSE;  Surgeon: Martinez Flores III, MD;  Location: [facility];  Service: ENT;  Laterality: N/A;  FOLLOW DR SALVATORE KIMBROUGH PROTOCOL    OPEN REDUCTION AND INTERNAL FIXATION (ORIF) OF FRACTURE OF DISTAL RADIUS Left 1/24/2020    Procedure: ORIF, FRACTURE, RADIUS, DISTAL-left;  Surgeon: Ellen Moe MD;  Location: [facility];  Service: Orthopedics;  Laterality: Left;    SURGICAL REMOVAL OF NASAL TURBINATE Bilateral 12/19/2019    Procedure: EXCISION, NASAL TURBINATE;  Surgeon: Martinez Flores III, MD;  Location: [facility];  Service: ENT;  Laterality: Bilateral;    TONSILLECTOMY      WISDOM TOOTH EXTRACTION         FAMILY HISTORY:    Family History   Adopted: Yes   Problem Relation Age of Onset    Amblyopia Neg Hx     Blindness Neg Hx     Cataracts Neg Hx     Glaucoma Neg Hx     Macular degeneration Neg Hx     Retinal detachment Neg Hx        SOCIAL HISTORY:    Social History     Socioeconomic History    Marital status: Single     Spouse name: Not on file    Number of children: Not on file    Years of education: Not on file    Highest  education level: Not on file   Occupational History    Occupation: /Rabbi     Employer: [employer]   Social Needs    Financial resource strain: Somewhat hard    Food insecurity     Worry: Never true     Inability: Never true    Transportation needs     Medical: No     Non-medical: Yes   Tobacco Use    Smoking status: Former Smoker     Packs/day: 0.25     Years: 15.00     Pack years: 3.75     Quit date: 1982     Years since quittin.1    Smokeless tobacco: Never Used   Substance and Sexual Activity    Alcohol use: Yes     Frequency: 2-4 times a month     Drinks per session: 1 or 2     Binge frequency: Never     Comment: occ    Drug use: No    Sexual activity: Not Currently   Lifestyle    Physical activity     Days per week: 1 day     Minutes per session: 20 min    Stress: Only a little   Relationships    Social connections     Talks on phone: More than three times a week     Gets together: Once a week     Attends [employer] service: 1 to 4 times per year     Active member of club or organization: Yes     Attends meetings of clubs or organizations: 1 to 4 times per year     Relationship status:    Other Topics Concern    Patient feels they ought to cut down on drinking/drug use Not Asked    Patient annoyed by others criticizing their drinking/drug use Not Asked    Patient has felt bad or guilty about drinking/drug use Not Asked    Patient has had a drink/used drugs as an eye opener in the AM Not Asked   Social History Narrative    Not on file       PHYSICAL EXAM:  VITAL SIGNS:   Vitals:    09/15/20 1507   Weight: 70.3 kg (155 lb)     GENERAL:  Patient appears well nourished, sitting on exam table, in no acute distress.  HEAD: Atraumatic, Normocephalic, no lesions  FACE: Symmetric, HB 1/6 bilat, no lesions, no obvious sinus tenderness, salivary glands nontender  EYES: Sclera anicteric, PERRLA, EOMI  NOSE: Dorsum straight, septum midline, normal turbinate size,  normal mucosa  RIGHT EAR: Pinna and external ear appears normal, EAC with thick cerumen, cleaned under microscope, now patent, TM intact  LEFT EAR: Pinna and external ear appears normal, EAC with thick cerumen, cleaned under microscope, now patent, TM intact  HEARING: Grossly intact  ORAL CAVITY: Healthy mucosa, no masses or lesions including lips, gums, floor of mouth, palate, or tongue.  OROPHARYNX: Tonsils intact, palate intact, normal pharyngeal wall movement  NECK: Supple, no palpable nodes, no masses, trachea midline, no thyroid masses  Cardiovascular system: Pulses regular in both upper extremities, good skin turgor  CARDIOVASCULAR:  RRR  RESPIRATORY:  No wheezing or stridor.  No increased work of breathing, no use of accessory muscles.  EXTREMITIES:  2+ DP pulses b/l, no edema.  SKIN:  Warm, no lesions on exposed skin.  NEUROMUSCULAR:  Cranial nerves II-XII grossly intact.    PSYCH:  Patient is alert and oriented to person, time, place.     LABORATORY/IMAGING STUDIES:      Cerumen removal: Ears cleared under microscopic vision with curette, forceps and suction as necessary.     ASSESSMENT/PLAN: This is a 70 y.o. female who presents to clinic for evaluation of cerumen impaction (bilateral) (cleaned), and hearing evaluation    -Education provided on cerumen  -Audiogram reviewed, Patient not interested in hearing aids at the moment      Raymond Carvalho MD  Otolaryngology-[facility]   [phone]

## 2020-09-18 ENCOUNTER — OFFICE VISIT (OUTPATIENT)
Dept: PODIATRY | Facility: CLINIC | Age: 70
End: 2020-09-18
Payer: MEDICARE

## 2020-09-18 ENCOUNTER — HOSPITAL ENCOUNTER (OUTPATIENT)
Dept: RADIOLOGY | Facility: HOSPITAL | Age: 70
Discharge: HOME | End: 2020-09-18
Attending: OBSTETRICS & GYNECOLOGY
Payer: MEDICARE

## 2020-09-18 VITALS — SYSTOLIC BLOOD PRESSURE: 119 MMHG | DIASTOLIC BLOOD PRESSURE: 75 MMHG | HEART RATE: 77 BPM

## 2020-09-18 DIAGNOSIS — M20.42 HAMMER TOES OF BOTH FEET: ICD-10-CM

## 2020-09-18 DIAGNOSIS — Z12.31 ENCOUNTER FOR SCREENING MAMMOGRAM FOR BREAST CANCER: ICD-10-CM

## 2020-09-18 DIAGNOSIS — G60.8 SENSORY PERIPHERAL NEUROPATHY: ICD-10-CM

## 2020-09-18 DIAGNOSIS — B35.1 ONYCHOMYCOSIS DUE TO DERMATOPHYTE: Primary | ICD-10-CM

## 2020-09-18 DIAGNOSIS — M20.41 HAMMER TOES OF BOTH FEET: ICD-10-CM

## 2020-09-18 PROCEDURE — 77067 SCR MAMMO BI INCL CAD: CPT | Mod: 26,HCNC,, | Performed by: RADIOLOGY

## 2020-09-18 PROCEDURE — 1126F PR PAIN SEVERITY QUANTIFIED, NO PAIN PRESENT: ICD-10-PCS | Mod: HCNC,S$GLB,, | Performed by: PODIATRIST

## 2020-09-18 PROCEDURE — 1126F AMNT PAIN NOTED NONE PRSNT: CPT | Mod: HCNC,S$GLB,, | Performed by: PODIATRIST

## 2020-09-18 PROCEDURE — 99999 PR PBB SHADOW E&M-EST. PATIENT-LVL III: CPT | Mod: PBBFAC,HCNC,, | Performed by: PODIATRIST

## 2020-09-18 PROCEDURE — 1101F PR PT FALLS ASSESS DOC 0-1 FALLS W/OUT INJ PAST YR: ICD-10-PCS | Mod: HCNC,CPTII,S$GLB, | Performed by: PODIATRIST

## 2020-09-18 PROCEDURE — 3074F PR MOST RECENT SYSTOLIC BLOOD PRESSURE < 130 MM HG: ICD-10-PCS | Mod: HCNC,CPTII,S$GLB, | Performed by: PODIATRIST

## 2020-09-18 PROCEDURE — 11721 DEBRIDE NAIL 6 OR MORE: CPT | Mod: Q9,HCNC,S$GLB, | Performed by: PODIATRIST

## 2020-09-18 PROCEDURE — 1101F PT FALLS ASSESS-DOCD LE1/YR: CPT | Mod: HCNC,CPTII,S$GLB, | Performed by: PODIATRIST

## 2020-09-18 PROCEDURE — 77063 MAMMO DIGITAL SCREENING BILAT WITH TOMO: ICD-10-PCS | Mod: 26,HCNC,, | Performed by: RADIOLOGY

## 2020-09-18 PROCEDURE — 77063 BREAST TOMOSYNTHESIS BI: CPT | Mod: 26,HCNC,, | Performed by: RADIOLOGY

## 2020-09-18 PROCEDURE — 3074F SYST BP LT 130 MM HG: CPT | Mod: HCNC,CPTII,S$GLB, | Performed by: PODIATRIST

## 2020-09-18 PROCEDURE — 1159F PR MEDICATION LIST DOCUMENTED IN MEDICAL RECORD: ICD-10-PCS | Mod: HCNC,S$GLB,, | Performed by: PODIATRIST

## 2020-09-18 PROCEDURE — 77067 SCR MAMMO BI INCL CAD: CPT | Mod: TC,HCNC

## 2020-09-18 PROCEDURE — 3078F DIAST BP <80 MM HG: CPT | Mod: HCNC,CPTII,S$GLB, | Performed by: PODIATRIST

## 2020-09-18 PROCEDURE — 99999 PR PBB SHADOW E&M-EST. PATIENT-LVL III: ICD-10-PCS | Mod: PBBFAC,HCNC,, | Performed by: PODIATRIST

## 2020-09-18 PROCEDURE — 11721 PR DEBRIDEMENT OF NAILS, 6 OR MORE: ICD-10-PCS | Mod: Q9,HCNC,S$GLB, | Performed by: PODIATRIST

## 2020-09-18 PROCEDURE — 99212 OFFICE O/P EST SF 10 MIN: CPT | Mod: 25,HCNC,S$GLB, | Performed by: PODIATRIST

## 2020-09-18 PROCEDURE — 77067 MAMMO DIGITAL SCREENING BILAT WITH TOMO: ICD-10-PCS | Mod: 26,HCNC,, | Performed by: RADIOLOGY

## 2020-09-18 PROCEDURE — 99212 PR OFFICE/OUTPT VISIT, EST, LEVL II, 10-19 MIN: ICD-10-PCS | Mod: 25,HCNC,S$GLB, | Performed by: PODIATRIST

## 2020-09-18 PROCEDURE — 1159F MED LIST DOCD IN RCRD: CPT | Mod: HCNC,S$GLB,, | Performed by: PODIATRIST

## 2020-09-18 PROCEDURE — 3078F PR MOST RECENT DIASTOLIC BLOOD PRESSURE < 80 MM HG: ICD-10-PCS | Mod: HCNC,CPTII,S$GLB, | Performed by: PODIATRIST

## 2020-09-18 RX ORDER — CICLOPIROX 80 MG/ML
SOLUTION TOPICAL NIGHTLY
Qty: 1 BOTTLE | Refills: 0 | Status: SHIPPED | OUTPATIENT
Start: 2020-09-18 | End: 2021-05-25

## 2020-09-18 NOTE — PROGRESS NOTES
Subjective:      Patient ID: Manisha Wynne is a 70 y.o. female.    Chief Complaint:   Foot Pain    Manisha is a 70 y.o. female who presents to the clinic for evaluation and treatment of high risk feet. Manisha has a past medical history of Abnormal Pap smear, Atrial fibrillation, AV block, 1st degree (7/25/2012), Cataract, Depression (7/24/2012), Facet arthritis of lumbar region (3/31/2015), Falls, Hyperlipidemia, Hypertension (7/24/2012), Neuropathy, Other specified cardiac dysrhythmias(427.89), Sleep apnea, and Syncope (7/24/2012). The patient's chief complaint is long, thick toenails. This patient has documented high risk feet requiring routine maintenance secondary to peripheral neuropathy.    Pt relates overall she is doing ok... she had a zoom happy hour for her recent bday.     Pt relates the trial she was in for her neuropathy shut down because of covid.    She relates trying to cut the nails but no being successful... also wondering if she has a fungus again      PCP: Iris Blue MD    Date Last Seen by PCP: 5/21/20    Current shoe gear:  Affected Foot: Casual shoes     Unaffected Foot: Casual shoes        Hemoglobin A1C   Date Value Ref Range Status   11/29/2019 5.2 4.0 - 5.6 % Final     Comment:     ADA Screening Guidelines:  5.7-6.4%  Consistent with prediabetes  >or=6.5%  Consistent with diabetes  High levels of fetal hemoglobin interfere with the HbA1C  assay. Heterozygous hemoglobin variants (HbS, HgC, etc)do  not significantly interfere with this assay.   However, presence of multiple variants may affect accuracy.     06/21/2019 5.4 4.0 - 5.6 % Final     Comment:     ADA Screening Guidelines:  5.7-6.4%  Consistent with prediabetes  >or=6.5%  Consistent with diabetes  High levels of fetal hemoglobin interfere with the HbA1C  assay. Heterozygous hemoglobin variants (HbS, HgC, etc)do  not significantly interfere with this assay.   However, presence of multiple variants may affect  accuracy.     09/21/2018 5.4 4.0 - 5.6 % Final     Comment:     ADA Screening Guidelines:  5.7-6.4%  Consistent with prediabetes  >or=6.5%  Consistent with diabetes  High levels of fetal hemoglobin interfere with the HbA1C  assay. Heterozygous hemoglobin variants (HbS, HgC, etc)do  not significantly interfere with this assay.   However, presence of multiple variants may affect accuracy.          Past Medical History:   Diagnosis Date    Abnormal Pap smear     Atrial fibrillation     AV block, 1st degree 7/25/2012    Cataract     Depression 7/24/2012    Facet arthritis of lumbar region 3/31/2015    Falls     3 falls in the last 6 mos--noted 6/19/19    Hyperlipidemia     Hypertension 7/24/2012    Neuropathy     Other specified cardiac dysrhythmias(427.89)     Sleep apnea     Syncope 7/24/2012     Past Surgical History:   Procedure Laterality Date    APPLICATION OF CARTILAGE GRAFT Bilateral 12/19/2019    Procedure: APPLICATION, CARTILAGE GRAFT AURICULAR JEZ;  Surgeon: Martinez Flores III, MD;  Location: [facility] OR;  Service: ENT;  Laterality: Bilateral;    CARDIAC PACEMAKER PLACEMENT  09/07/2012    [id number]    DILATION AND CURETTAGE OF UTERUS      Hx of precancerous cells?    ENDOSCOPIC ULTRASOUND OF UPPER GASTROINTESTINAL TRACT N/A 7/5/2019    Procedure: ULTRASOUND, UPPER GI TRACT, ENDOSCOPIC;  Surgeon: Kev Calderon MD;  Location: [facility] ([address] [place] [address]);  Service: Endoscopy;  Laterality: N/A;  Pacemaker-Adam   appt confirmed-rb    NASAL SEPTOPLASTY N/A 12/19/2019    Procedure: SEPTOPLASTY, NOSE;  Surgeon: Martinez Flores III, MD;  Location: [facility];  Service: ENT;  Laterality: N/A;  FOLLOW DR SALVATORE KIMBROUGH PROTOCOL    OPEN REDUCTION AND INTERNAL FIXATION (ORIF) OF FRACTURE OF DISTAL RADIUS Left 1/24/2020    Procedure: ORIF, FRACTURE, RADIUS, DISTAL-left;  Surgeon: Ellen Moe MD;  Location: [facility] OR;  Service: Orthopedics;  Laterality: Left;    SURGICAL REMOVAL OF NASAL  TURBINATE Bilateral 12/19/2019    Procedure: EXCISION, NASAL TURBINATE;  Surgeon: Martinez Flores III, MD;  Location: [facility];  Service: ENT;  Laterality: Bilateral;    TONSILLECTOMY      WISDOM TOOTH EXTRACTION       Current Outpatient Medications on File Prior to Visit   Medication Sig Dispense Refill    aspirin (ECOTRIN) 81 MG EC tablet Take 81 mg by mouth. 1 Tablet, Delayed Release (E.C.) Oral Every day      Baclofen 4% Diazepam 5mg Lidocaine 2% Vag Supp Place one suppository vaginally, 30 mins before PT. 18.14 g 0    buPROPion (WELLBUTRIN XL) 300 MG 24 hr tablet Take 300 mg by mouth once daily.       calcium-vitamin D 600 mg(1,500mg) -200 unit Tab Take 1 tablet by mouth once daily.       cholecalciferol, vitamin D3, (VITAMIN D3) 5,000 unit Tab Take 5,000 Units by mouth once daily.      estradioL (ESTRACE) 0.01 % (0.1 mg/gram) vaginal cream Place 1 g vaginally twice a week. (Patient not taking: Reported on 7/8/2020) 1 Tube 1    ferrous sulfate (FEOSOL) 325 mg (65 mg iron) Tab tablet Take 325 mg by mouth daily with breakfast. Taking two times daily      flu vacc [id number],65yr up,PF (FLUZONE HIGH-DOSE 2019-20, PF,) 180 mcg/0.5 mL Syrg admin as directed 0.5 mL 0    FLUoxetine 40 MG capsule Take 40 mg by mouth once daily.      glycerin/min oil/polycarbophil (REPLENS VAGL) Place vaginally.      lisdexamfetamine (VYVANSE) 70 MG capsule Take 70 mg by mouth every morning.      losartan (COZAAR) 25 MG tablet TAKE 1 TABLET EVERY DAY 90 tablet 6    metoprolol tartrate (LOPRESSOR) 25 MG tablet TAKE 1 TABLET (25 MG TOTAL) BY MOUTH 2 (TWO) TIMES DAILY. 180 tablet 3    multivitamin-minerals-lutein (CENTRUM SILVER) Tab Take by mouth. 1 Tablet Oral Every day      pravastatin (PRAVACHOL) 10 MG tablet TAKE 1 TABLET (10 MG TOTAL) BY MOUTH ONCE DAILY. (Patient taking differently: Take 10 mg by mouth 2 (two) times daily. ) 90 tablet 3     No current facility-administered medications on file prior to visit.       Review of patient's allergies indicates:  No Known Allergies    Review of Systems   Constitution: Negative for chills, decreased appetite, fever, malaise/fatigue, night sweats, weight gain and weight loss.   Cardiovascular: Negative for chest pain, claudication, dyspnea on exertion, leg swelling, palpitations and syncope.   Respiratory: Negative for cough and shortness of breath.    Endocrine: Negative for cold intolerance and heat intolerance.   Hematologic/Lymphatic: Negative for bleeding problem. Does not bruise/bleed easily.   Skin: Positive for nail changes. Negative for color change, dry skin, flushing, itching, poor wound healing, rash, skin cancer, suspicious lesions and unusual hair distribution.   Musculoskeletal: Positive for arthritis, falls and stiffness. Negative for back pain, gout, joint pain, joint swelling, muscle cramps, muscle weakness, myalgias and neck pain.   Gastrointestinal: Negative for diarrhea, nausea and vomiting.   Neurological: Positive for numbness and paresthesias. Negative for dizziness, focal weakness, light-headedness, tremors, vertigo and weakness.   Psychiatric/Behavioral: Negative for altered mental status and depression. The patient does not have insomnia.    Allergic/Immunologic: Negative.            Objective:       Vitals:    09/18/20 0952   BP: 119/75   Pulse: 77   PainSc: 0-No pain     afeb    Physical Exam  Vitals signs reviewed.   Constitutional:       General: She is not in acute distress.     Appearance: She is well-developed. She is not ill-appearing, toxic-appearing or diaphoretic.      Comments: Proper supportive shoegear   Cardiovascular:      Pulses:           Dorsalis pedis pulses are 2+ on the right side and 2+ on the left side.        Posterior tibial pulses are 2+ on the right side and 2+ on the left side.   Musculoskeletal:         General: Deformity present. No tenderness.      Right lower leg: No edema.      Left lower leg: No edema.      Right foot:  Decreased range of motion. Deformity present.      Left foot: Decreased range of motion. No deformity.      Comments: No pop or with rom    Reducible extensor and flexor contractures at the MTPJ and PIPJ of toes 2-5, bilat.    Feet:      Right foot:      Protective Sensation: 10 sites tested. 2 sites sensed.      Skin integrity: Dry skin present. No ulcer, blister, skin breakdown, erythema, warmth or callus.      Toenail Condition: Right toenails are abnormally thick and long.      Left foot:      Protective Sensation: 10 sites tested. 4 sites sensed.      Skin integrity: Dry skin present. No ulcer, blister, skin breakdown, erythema, warmth or callus.      Toenail Condition: Left toenails are abnormally thick and long. Fungal disease present.     Comments: Toenails 1-5 bilaterally are elongated by 2-3 mm, thickened by 2-3 mm, discolored/yellowed, dystrophic, brittle with subungual debris.  Skin:     General: Skin is warm.      Capillary Refill: Capillary refill takes 2 to 3 seconds.      Coloration: Skin is not pale.      Findings: No erythema or rash.      Nails: There is clubbing.      Comments: Nails 1-10 elongated spoonshaped/clubbed    no macerations to interspaces.     No soi    No open lesions   Neurological:      Mental Status: She is alert and oriented to person, place, and time.      Sensory: Sensory deficit present.      Motor: Atrophy present.      Gait: Gait abnormal.   Psychiatric:         Attention and Perception: Attention normal.         Mood and Affect: Mood normal.         Speech: Speech normal.         Behavior: Behavior normal. Behavior is cooperative.         Thought Content: Thought content normal.         Judgment: Judgment normal.                    Assessment:       Encounter Diagnoses   Name Primary?    Onychomycosis due to dermatophyte Yes    Sensory peripheral neuropathy     Hammer toes of both feet          Plan:       Manisha was seen today for foot pain.    Diagnoses and all orders  for this visit:    Onychomycosis due to dermatophyte    Sensory peripheral neuropathy    Hammer toes of both feet    Other orders  -     ciclopirox (PENLAC) 8 % Soln; Apply topically nightly. Remove once a week with nail polish      I counseled the patient on her conditions, their implications and medical management.        - Shoe inspection. Patient instructed on proper foot hygeine. We discussed wearing proper shoe gear, daily foot inspections, never walking without protective shoe gear, never putting sharp instruments to feet, routine podiatric nail visits every 2-3 months.      - With patient's permission, nails were aggressively reduced and debrided x 10 to their soft tissue attachment mechanically  removing all offending nail and debris. Patient relates relief following the procedure.      - try the penlac    - encouraged pt to keep f/u with neurology    - f/u 3 months or sooner if needed; check feet daily

## 2020-09-20 ENCOUNTER — PATIENT MESSAGE (OUTPATIENT)
Dept: CARDIOLOGY | Facility: CLINIC | Age: 70
End: 2020-09-20

## 2020-09-21 ENCOUNTER — IMMUNIZATION (OUTPATIENT)
Dept: PHARMACY | Facility: CLINIC | Age: 70
End: 2020-09-21
Payer: MEDICARE

## 2020-09-21 DIAGNOSIS — I10 HYPERTENSION, ESSENTIAL: ICD-10-CM

## 2020-09-21 DIAGNOSIS — E78.2 MIXED HYPERLIPIDEMIA: Primary | ICD-10-CM

## 2020-09-21 RX ORDER — PRAVASTATIN SODIUM 20 MG/1
20 TABLET ORAL DAILY
Qty: 90 TABLET | Refills: 3 | Status: SHIPPED | OUTPATIENT
Start: 2020-09-21 | End: 2020-12-22 | Stop reason: SDUPTHER

## 2020-09-21 RX ORDER — METOPROLOL TARTRATE 25 MG/1
25 TABLET, FILM COATED ORAL 2 TIMES DAILY
Qty: 180 TABLET | Refills: 3 | Status: SHIPPED | OUTPATIENT
Start: 2020-09-21 | End: 2021-10-13

## 2020-09-21 RX ORDER — PRAVASTATIN SODIUM 20 MG/1
20 TABLET ORAL DAILY
COMMUNITY
End: 2020-09-21 | Stop reason: SDUPTHER

## 2020-09-24 ENCOUNTER — TELEPHONE (OUTPATIENT)
Dept: UROGYNECOLOGY | Facility: CLINIC | Age: 70
End: 2020-09-24

## 2020-09-24 NOTE — TELEPHONE ENCOUNTER
----- Message from Roberto Mariscal MD sent at 9/23/2020  9:13 PM CDT -----  Please let patient know mammogram was normal, Thanks!

## 2020-09-24 NOTE — TELEPHONE ENCOUNTER
LMOR.  Let patient know mammogram was normal.  Requested patient call ([phone]) with any questions.  Call ended.

## 2020-09-25 ENCOUNTER — PATIENT MESSAGE (OUTPATIENT)
Dept: ORTHOPEDICS | Facility: CLINIC | Age: 70
End: 2020-09-25

## 2020-10-09 DIAGNOSIS — I49.8 OTHER SPECIFIED CARDIAC ARRHYTHMIAS: Primary | ICD-10-CM

## 2020-10-12 ENCOUNTER — HOSPITAL ENCOUNTER (OUTPATIENT)
Dept: CARDIOLOGY | Facility: CLINIC | Age: 70
Discharge: HOME | End: 2020-10-12
Payer: MEDICARE

## 2020-10-12 ENCOUNTER — OFFICE VISIT (OUTPATIENT)
Dept: ELECTROPHYSIOLOGY | Facility: CLINIC | Age: 70
End: 2020-10-12
Payer: MEDICARE

## 2020-10-12 ENCOUNTER — CLINICAL SUPPORT (OUTPATIENT)
Dept: CARDIOLOGY | Facility: HOSPITAL | Age: 70
End: 2020-10-12
Attending: INTERNAL MEDICINE
Payer: MEDICARE

## 2020-10-12 VITALS
BODY MASS INDEX: 23.79 KG/M2 | WEIGHT: 156.94 LBS | DIASTOLIC BLOOD PRESSURE: 70 MMHG | SYSTOLIC BLOOD PRESSURE: 145 MMHG | HEART RATE: 73 BPM | HEIGHT: 68 IN

## 2020-10-12 DIAGNOSIS — G47.33 OSA (OBSTRUCTIVE SLEEP APNEA): ICD-10-CM

## 2020-10-12 DIAGNOSIS — I49.8 OTHER SPECIFIED CARDIAC ARRHYTHMIAS: ICD-10-CM

## 2020-10-12 DIAGNOSIS — Z95.0 CARDIAC PACEMAKER IN SITU: Primary | ICD-10-CM

## 2020-10-12 DIAGNOSIS — I10 ESSENTIAL HYPERTENSION: Chronic | ICD-10-CM

## 2020-10-12 DIAGNOSIS — I49.5 SSS (SICK SINUS SYNDROME): ICD-10-CM

## 2020-10-12 DIAGNOSIS — I49.5 SSS (SICK SINUS SYNDROME): Chronic | ICD-10-CM

## 2020-10-12 DIAGNOSIS — I45.10 RBBB: ICD-10-CM

## 2020-10-12 DIAGNOSIS — I44.0 AV BLOCK, 1ST DEGREE: Chronic | ICD-10-CM

## 2020-10-12 PROCEDURE — 1159F PR MEDICATION LIST DOCUMENTED IN MEDICAL RECORD: ICD-10-PCS | Mod: HCNC,S$GLB,, | Performed by: NURSE PRACTITIONER

## 2020-10-12 PROCEDURE — 3077F PR MOST RECENT SYSTOLIC BLOOD PRESSURE >= 140 MM HG: ICD-10-PCS | Mod: HCNC,CPTII,S$GLB, | Performed by: NURSE PRACTITIONER

## 2020-10-12 PROCEDURE — 3008F BODY MASS INDEX DOCD: CPT | Mod: HCNC,CPTII,S$GLB, | Performed by: NURSE PRACTITIONER

## 2020-10-12 PROCEDURE — 93280 PM DEVICE PROGR EVAL DUAL: CPT | Mod: HCNC

## 2020-10-12 PROCEDURE — 1126F PR PAIN SEVERITY QUANTIFIED, NO PAIN PRESENT: ICD-10-PCS | Mod: HCNC,S$GLB,, | Performed by: NURSE PRACTITIONER

## 2020-10-12 PROCEDURE — 3077F SYST BP >= 140 MM HG: CPT | Mod: HCNC,CPTII,S$GLB, | Performed by: NURSE PRACTITIONER

## 2020-10-12 PROCEDURE — 3078F PR MOST RECENT DIASTOLIC BLOOD PRESSURE < 80 MM HG: ICD-10-PCS | Mod: HCNC,CPTII,S$GLB, | Performed by: NURSE PRACTITIONER

## 2020-10-12 PROCEDURE — 99999 PR PBB SHADOW E&M-EST. PATIENT-LVL IV: ICD-10-PCS | Mod: PBBFAC,HCNC,, | Performed by: NURSE PRACTITIONER

## 2020-10-12 PROCEDURE — 99214 PR OFFICE/OUTPT VISIT, EST, LEVL IV, 30-39 MIN: ICD-10-PCS | Mod: HCNC,S$GLB,, | Performed by: NURSE PRACTITIONER

## 2020-10-12 PROCEDURE — 93010 RHYTHM STRIP: ICD-10-PCS | Mod: HCNC,S$GLB,, | Performed by: INTERNAL MEDICINE

## 2020-10-12 PROCEDURE — 93010 ELECTROCARDIOGRAM REPORT: CPT | Mod: HCNC,S$GLB,, | Performed by: INTERNAL MEDICINE

## 2020-10-12 PROCEDURE — 99999 PR PBB SHADOW E&M-EST. PATIENT-LVL IV: CPT | Mod: PBBFAC,HCNC,, | Performed by: NURSE PRACTITIONER

## 2020-10-12 PROCEDURE — 1126F AMNT PAIN NOTED NONE PRSNT: CPT | Mod: HCNC,S$GLB,, | Performed by: NURSE PRACTITIONER

## 2020-10-12 PROCEDURE — 1101F PT FALLS ASSESS-DOCD LE1/YR: CPT | Mod: HCNC,CPTII,S$GLB, | Performed by: NURSE PRACTITIONER

## 2020-10-12 PROCEDURE — 93280 CARDIAC DEVICE CHECK - IN CLINIC & HOSPITAL: ICD-10-PCS | Mod: 26,HCNC,, | Performed by: INTERNAL MEDICINE

## 2020-10-12 PROCEDURE — 93005 RHYTHM STRIP: ICD-10-PCS | Mod: HCNC,S$GLB,, | Performed by: INTERNAL MEDICINE

## 2020-10-12 PROCEDURE — 1159F MED LIST DOCD IN RCRD: CPT | Mod: HCNC,S$GLB,, | Performed by: NURSE PRACTITIONER

## 2020-10-12 PROCEDURE — 1101F PR PT FALLS ASSESS DOC 0-1 FALLS W/OUT INJ PAST YR: ICD-10-PCS | Mod: HCNC,CPTII,S$GLB, | Performed by: NURSE PRACTITIONER

## 2020-10-12 PROCEDURE — 93005 ELECTROCARDIOGRAM TRACING: CPT | Mod: HCNC,S$GLB,, | Performed by: INTERNAL MEDICINE

## 2020-10-12 PROCEDURE — 99214 OFFICE O/P EST MOD 30 MIN: CPT | Mod: HCNC,S$GLB,, | Performed by: NURSE PRACTITIONER

## 2020-10-12 PROCEDURE — 3078F DIAST BP <80 MM HG: CPT | Mod: HCNC,CPTII,S$GLB, | Performed by: NURSE PRACTITIONER

## 2020-10-12 PROCEDURE — 3008F PR BODY MASS INDEX (BMI) DOCUMENTED: ICD-10-PCS | Mod: HCNC,CPTII,S$GLB, | Performed by: NURSE PRACTITIONER

## 2020-10-12 PROCEDURE — 93280 PM DEVICE PROGR EVAL DUAL: CPT | Mod: 26,HCNC,, | Performed by: INTERNAL MEDICINE

## 2020-10-12 NOTE — PROGRESS NOTES
Ms. Wynne is a patient of Dr. Sheets and was last seen in clinic 10/6/2019.      Subjective:   Patient ID:  Manisha Wynne is a 70 y.o. female who presents for follow-up of Pacemaker Check  .     HPI:    Ms. Wynne is a 70 y.o. female with SSS, PPM, HLD, HTN, and CEDRIC here for annual follow up.     Background:    Primary Care Physician: Iris Blue MD    Ms. Wynne has a history of sick sinus syndrome, syncope and high-grade infrahisian block s/p dcPPM (Adam, 9/2012), hypertension, and CEDRIC here for follow-up.     She saw Svetlana Miner in clinic 5/2019. She reported she had recently experienced a fall while in [place] for a conference. She dislocated her finger, but head CT was negative. She was feeling fatigued and lost her balance as she stepped off the curb. She did not lose consciousness. Device interrogation showed no arrhythmic episodes during period of her fall. Her last ECHO 1/2019 noted normal LVEF.    10/2019: She reports between January and May she had 3 falls. No syncope. She reports she has a sensory neuropathy and is falling with Neurology for this.    Update (10/12/2020):    She did have a mechanical fall in late January and broke her wrist. She was in a clinical trial for a balance device at [facility] but this has ended. No arrhythmias associated with event. Ms. Wynne denies chest pain with exertion or at rest, palpitations, SOB, SHEPARD, or syncope.    Device Interrogation (10/12/2020) reveals an intrinsic SB with stable lead and device function. NSATx7, max 14 seconds. No ventricular arrhythmias. She paces 87% in the RA and 0% in the RV. Estimated battery longevity 2.1 years.     I have personally reviewed the patient's EKG today, which shows APVS with RBBB at 73bpm. IL interval is 364. QRS is 142. QTc is 473.    Recent Cardiac Tests:    2D Echo (1/25/2019):  · Mild-to-moderate aortic regurgitation.  · Mild mitral regurgitation.  · Mild tricuspid regurgitation.  · Normal central  venous pressure (3 mm Hg).  · The estimated PA systolic pressure is 27 mm Hg  · Normal left ventricular systolic function. The estimated ejection fraction is 60%  · Indeterminate left ventricular diastolic function.  · Normal right ventricular systolic function.    Current Outpatient Medications   Medication Sig    aspirin (ECOTRIN) 81 MG EC tablet Take 81 mg by mouth. 1 Tablet, Delayed Release (E.C.) Oral Every day    Baclofen 4% Diazepam 5mg Lidocaine 2% Vag Supp Place one suppository vaginally, 30 mins before PT.    buPROPion (WELLBUTRIN XL) 300 MG 24 hr tablet Take 300 mg by mouth once daily.     calcium-vitamin D 600 mg(1,500mg) -200 unit Tab Take 1 tablet by mouth once daily.     cholecalciferol, vitamin D3, (VITAMIN D3) 5,000 unit Tab Take 5,000 Units by mouth once daily.    ciclopirox (PENLAC) 8 % Soln Apply topically nightly. Remove once a week with nail polish    denosumab (PROLIA SUBQ) Inject into the skin.    estradioL (ESTRACE) 0.01 % (0.1 mg/gram) vaginal cream Place 1 g vaginally twice a week. (Patient taking differently: Place 1 g vaginally twice a week. As needed)    ferrous sulfate (FEOSOL) 325 mg (65 mg iron) Tab tablet Take 325 mg by mouth daily with breakfast. Taking two times daily    flu vacc [id number],65yr up,PF (FLUZONE HIGH-DOSE 2019-20, PF,) 180 mcg/0.5 mL Syrg admin as directed    FLUoxetine 40 MG capsule Take 40 mg by mouth once daily.    glycerin/min oil/polycarbophil (REPLENS VAGL) Place vaginally.    lisdexamfetamine (VYVANSE) 70 MG capsule Take 70 mg by mouth every morning.    losartan (COZAAR) 25 MG tablet TAKE 1 TABLET EVERY DAY    metoprolol tartrate (LOPRESSOR) 25 MG tablet Take 1 tablet (25 mg total) by mouth 2 (two) times daily.    multivitamin-minerals-lutein (CENTRUM SILVER) Tab Take by mouth. 1 Tablet Oral Every day    pravastatin (PRAVACHOL) 20 MG tablet Take 1 tablet (20 mg total) by mouth once daily.     No current facility-administered medications for  "this visit.      Review of Systems   Constitution: Negative for malaise/fatigue.   Cardiovascular: Negative for chest pain, dyspnea on exertion, irregular heartbeat, leg swelling and palpitations.   Respiratory: Negative for shortness of breath.    Hematologic/Lymphatic: Negative for bleeding problem.   Skin: Negative for rash.   Musculoskeletal: Positive for falls. Negative for myalgias.   Gastrointestinal: Negative for hematemesis, hematochezia and nausea.   Genitourinary: Negative for hematuria.   Neurological: Positive for loss of balance. Negative for light-headedness.   Psychiatric/Behavioral: Negative for altered mental status.   Allergic/Immunologic: Negative for persistent infections.     Objective:        BP (!) 145/70   Pulse 73   Ht 5' 8" (1.727 m)   Wt 71.2 kg (156 lb 15.5 oz)   LMP 10/01/2003   BMI 23.87 kg/m²     Physical Exam   Constitutional: She is oriented to person, place, and time. She appears well-developed and well-nourished.   HENT:   Head: Normocephalic.   Nose: Nose normal.   Eyes: Pupils are equal, round, and reactive to light.   Cardiovascular: Normal rate, regular rhythm, S1 normal and S2 normal.   No murmur heard.  Pulses:       Radial pulses are 2+ on the right side and 2+ on the left side.   Pulmonary/Chest: Breath sounds normal. No respiratory distress.   Device to LUCW. Incision and pocket in good repair.   Abdominal: Normal appearance.   Musculoskeletal: Normal range of motion.         General: No edema.   Neurological: She is alert and oriented to person, place, and time.   Skin: Skin is warm and dry. No erythema.   Psychiatric: She has a normal mood and affect. Her speech is normal and behavior is normal.   Nursing note and vitals reviewed.    Lab Results   Component Value Date     05/26/2020    K 4.4 05/26/2020    MG 2.5 09/07/2012    BUN 15 05/26/2020    CREATININE 1.1 05/26/2020    ALT 33 07/21/2020    AST 28 07/21/2020    HGB 11.8 (L) 12/13/2019    HCT 36.6 (L) " 12/13/2019    TSH 3.601 05/18/2020    LDLCALC 129.0 07/21/2020     Recent Labs   Lab 11/29/19  1130   INR 1.0       Assessment:     1. Cardiac pacemaker in situ    2. CEDRIC (obstructive sleep apnea)    3. AV block, 1st degree    4. Essential hypertension    5. RBBB    6. SSS (sick sinus syndrome)      Plan:     In summary, Ms. Wynne is a 70 y.o. female with SSS, PPM, HLD, HTN, and CEDRIC here for annual follow up.   Ms. Wynne is doing well from a device perspective with stable lead and device function. No RV pacing. No sustained arrhythmia noted. She is on the digital HTN program.  Continues to work with neurology on her balance issues. Otherwise she feels well.    Continue current medication regimen and device settings.   Follow up in device clinic as scheduled.   Follow up in EP clinic in 1 year, sooner as needed.     *A copy of this note has been sent to Dr. Sheets*    Follow up in about 1 year (around 10/12/2021).    ------------------------------------------------------------------    AL Lanza, NP-C  Cardiac Electrophysiology

## 2020-10-16 ENCOUNTER — LAB VISIT (OUTPATIENT)
Dept: LAB | Facility: HOSPITAL | Age: 70
End: 2020-10-16
Attending: INTERNAL MEDICINE
Payer: MEDICARE

## 2020-10-16 ENCOUNTER — TELEPHONE (OUTPATIENT)
Dept: CARDIOLOGY | Facility: CLINIC | Age: 70
End: 2020-10-16

## 2020-10-16 DIAGNOSIS — N18.30 CKD (CHRONIC KIDNEY DISEASE) STAGE 3, GFR 30-59 ML/MIN: ICD-10-CM

## 2020-10-16 DIAGNOSIS — I10 ESSENTIAL HYPERTENSION: ICD-10-CM

## 2020-10-16 DIAGNOSIS — E78.2 MIXED HYPERLIPIDEMIA: ICD-10-CM

## 2020-10-16 LAB
ALBUMIN SERPL BCP-MCNC: 4 G/DL (ref 3.5–5.2)
ALP SERPL-CCNC: 53 U/L (ref 55–135)
ALT SERPL W/O P-5'-P-CCNC: 23 U/L (ref 10–44)
AST SERPL-CCNC: 21 U/L (ref 10–40)
BILIRUB DIRECT SERPL-MCNC: 0.3 MG/DL (ref 0.1–0.3)
BILIRUB SERPL-MCNC: 0.8 MG/DL (ref 0.1–1)
CHOLEST SERPL-MCNC: 209 MG/DL (ref 120–199)
CHOLEST/HDLC SERPL: 2.8 {RATIO} (ref 2–5)
HDLC SERPL-MCNC: 76 MG/DL (ref 40–75)
HDLC SERPL: 36.4 % (ref 20–50)
LDLC SERPL CALC-MCNC: 116.8 MG/DL (ref 63–159)
NONHDLC SERPL-MCNC: 133 MG/DL
PROT SERPL-MCNC: 7 G/DL (ref 6–8.4)
TRIGL SERPL-MCNC: 81 MG/DL (ref 30–150)

## 2020-10-16 PROCEDURE — 36415 COLL VENOUS BLD VENIPUNCTURE: CPT | Mod: HCNC,PO

## 2020-10-16 PROCEDURE — 80061 LIPID PANEL: CPT | Mod: HCNC

## 2020-10-16 PROCEDURE — 80076 HEPATIC FUNCTION PANEL: CPT | Mod: HCNC

## 2020-10-16 NOTE — TELEPHONE ENCOUNTER
----- Message from Natali Comer MD sent at 10/16/2020  2:19 PM CDT -----  Please inform the patient that lipids are slightly elevated, but mostly due to high HDL-C ( good cholesterol., so overall this is reasonable profile.  Would recommend low cholesterol diet and continue on the same dose of statin

## 2020-10-19 ENCOUNTER — OFFICE VISIT (OUTPATIENT)
Dept: PSYCHIATRY | Facility: CLINIC | Age: 70
End: 2020-10-19
Payer: MEDICARE

## 2020-10-19 DIAGNOSIS — F33.41 MAJOR DEPRESSIVE DISORDER, RECURRENT EPISODE, IN PARTIAL REMISSION: Primary | ICD-10-CM

## 2020-10-19 DIAGNOSIS — F98.8 ATTENTION DEFICIT DISORDER (ADD) IN ADULT: ICD-10-CM

## 2020-10-19 PROCEDURE — 90834 PR PSYCHOTHERAPY W/PATIENT, 45 MIN: ICD-10-PCS | Mod: HCNC,95,, | Performed by: SOCIAL WORKER

## 2020-10-19 PROCEDURE — 90834 PSYTX W PT 45 MINUTES: CPT | Mod: HCNC,95,, | Performed by: SOCIAL WORKER

## 2020-10-19 NOTE — PROGRESS NOTES
"The patient location is: [place]  The chief complaint leading to consultation is: depression  Visit type: Virtual visit with synchronous audio and video  Total time spent with patient: 45 minutes  Each patient to whom he or she provides medical services by telemedicine is:  (1) informed of the relationship between the physician and patient and the respective role of any other health care provider with respect to management of the patient; and (2) notified that he or she may decline to receive medical services by telemedicine and may withdraw from such care at any time.    Notes: Individual Psychotherapy (PhD/LCSW)    10/19/2020    Site: telemed    Therapeutic Intervention: Met with patient alone.  Outpatient - Insight oriented psychotherapy 45 min - CPT code 10948    Chief complaint/reason for encounter: depression, distractibility     Interval history and content of current session: Pt frustrated, staying in bed into the afternoon most days doing puzzles and reading [employer]. Pt shares some meaningful interactions with patients and families through volunteer activity, feels good about these, but can't get herself to deal with her home clutter. Invite pt to dialogue with inner child who learned to resist parents strongly in order to individuate, as pt still sees this as a strength while staying angry and frustrated with herself. Pt also expresses feeling that it is "too late" to change, that she has messed up her life. Confront this belief based on recent meaningful events.    Treatment plan:  · Target symptoms: depression, distractibility  · Why chosen therapy is appropriate versus another modality: relevant to diagnosis  · Outcome monitoring methods: self-report  · Therapeutic intervention type: insight oriented psychotherapy    Risk parameters:  Patient reports no suicidal ideation  Patient reports no homicidal ideation  Patient reports no self-injurious behavior  Patient reports no violent behavior    Verbal " deficits: None    Patient's response to intervention:  The patient's response to intervention is motivated    Progress toward goals and other mental status changes:  The patient's progress toward goals is limited    Diagnosis: Major depression, recurrent, in partial remission,  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

## 2020-11-02 ENCOUNTER — OFFICE VISIT (OUTPATIENT)
Dept: PSYCHIATRY | Facility: CLINIC | Age: 70
End: 2020-11-02
Payer: MEDICARE

## 2020-11-02 ENCOUNTER — PATIENT MESSAGE (OUTPATIENT)
Dept: PSYCHIATRY | Facility: CLINIC | Age: 70
End: 2020-11-02

## 2020-11-02 DIAGNOSIS — F33.41 MAJOR DEPRESSIVE DISORDER, RECURRENT EPISODE, IN PARTIAL REMISSION: Primary | ICD-10-CM

## 2020-11-02 DIAGNOSIS — F98.8 ATTENTION DEFICIT DISORDER (ADD) IN ADULT: ICD-10-CM

## 2020-11-02 PROCEDURE — 90834 PSYTX W PT 45 MINUTES: CPT | Mod: HCNC,95,, | Performed by: SOCIAL WORKER

## 2020-11-02 PROCEDURE — 90834 PR PSYCHOTHERAPY W/PATIENT, 45 MIN: ICD-10-PCS | Mod: HCNC,95,, | Performed by: SOCIAL WORKER

## 2020-11-13 ENCOUNTER — PATIENT OUTREACH (OUTPATIENT)
Dept: ADMINISTRATIVE | Facility: HOSPITAL | Age: 70
End: 2020-11-13

## 2020-11-17 ENCOUNTER — OFFICE VISIT (OUTPATIENT)
Dept: NEUROLOGY | Facility: CLINIC | Age: 70
End: 2020-11-17
Payer: MEDICARE

## 2020-11-17 VITALS
BODY MASS INDEX: 24.32 KG/M2 | HEIGHT: 68 IN | HEART RATE: 68 BPM | SYSTOLIC BLOOD PRESSURE: 149 MMHG | WEIGHT: 160.5 LBS | DIASTOLIC BLOOD PRESSURE: 84 MMHG

## 2020-11-17 DIAGNOSIS — G60.8 SENSORY PERIPHERAL NEUROPATHY: Primary | ICD-10-CM

## 2020-11-17 PROCEDURE — 1159F MED LIST DOCD IN RCRD: CPT | Mod: HCNC,S$GLB,, | Performed by: NEUROMUSCULOSKELETAL MEDICINE & OMM

## 2020-11-17 PROCEDURE — 99214 OFFICE O/P EST MOD 30 MIN: CPT | Mod: HCNC,S$GLB,, | Performed by: NEUROMUSCULOSKELETAL MEDICINE & OMM

## 2020-11-17 PROCEDURE — 99999 PR PBB SHADOW E&M-EST. PATIENT-LVL IV: ICD-10-PCS | Mod: PBBFAC,HCNC,, | Performed by: NEUROMUSCULOSKELETAL MEDICINE & OMM

## 2020-11-17 PROCEDURE — 99999 PR PBB SHADOW E&M-EST. PATIENT-LVL IV: CPT | Mod: PBBFAC,HCNC,, | Performed by: NEUROMUSCULOSKELETAL MEDICINE & OMM

## 2020-11-17 PROCEDURE — 1157F ADVNC CARE PLAN IN RCRD: CPT | Mod: HCNC,S$GLB,, | Performed by: NEUROMUSCULOSKELETAL MEDICINE & OMM

## 2020-11-17 PROCEDURE — 3077F SYST BP >= 140 MM HG: CPT | Mod: HCNC,CPTII,S$GLB, | Performed by: NEUROMUSCULOSKELETAL MEDICINE & OMM

## 2020-11-17 PROCEDURE — 1100F PTFALLS ASSESS-DOCD GE2>/YR: CPT | Mod: HCNC,CPTII,S$GLB, | Performed by: NEUROMUSCULOSKELETAL MEDICINE & OMM

## 2020-11-17 PROCEDURE — 3077F PR MOST RECENT SYSTOLIC BLOOD PRESSURE >= 140 MM HG: ICD-10-PCS | Mod: HCNC,CPTII,S$GLB, | Performed by: NEUROMUSCULOSKELETAL MEDICINE & OMM

## 2020-11-17 PROCEDURE — 3008F PR BODY MASS INDEX (BMI) DOCUMENTED: ICD-10-PCS | Mod: HCNC,CPTII,S$GLB, | Performed by: NEUROMUSCULOSKELETAL MEDICINE & OMM

## 2020-11-17 PROCEDURE — 1159F PR MEDICATION LIST DOCUMENTED IN MEDICAL RECORD: ICD-10-PCS | Mod: HCNC,S$GLB,, | Performed by: NEUROMUSCULOSKELETAL MEDICINE & OMM

## 2020-11-17 PROCEDURE — 1126F PR PAIN SEVERITY QUANTIFIED, NO PAIN PRESENT: ICD-10-PCS | Mod: HCNC,S$GLB,, | Performed by: NEUROMUSCULOSKELETAL MEDICINE & OMM

## 2020-11-17 PROCEDURE — 1100F PR PT FALLS ASSESS DOC 2+ FALLS/FALL W/INJURY/YR: ICD-10-PCS | Mod: HCNC,CPTII,S$GLB, | Performed by: NEUROMUSCULOSKELETAL MEDICINE & OMM

## 2020-11-17 PROCEDURE — 3288F PR FALLS RISK ASSESSMENT DOCUMENTED: ICD-10-PCS | Mod: HCNC,CPTII,S$GLB, | Performed by: NEUROMUSCULOSKELETAL MEDICINE & OMM

## 2020-11-17 PROCEDURE — 3288F FALL RISK ASSESSMENT DOCD: CPT | Mod: HCNC,CPTII,S$GLB, | Performed by: NEUROMUSCULOSKELETAL MEDICINE & OMM

## 2020-11-17 PROCEDURE — 3079F DIAST BP 80-89 MM HG: CPT | Mod: HCNC,CPTII,S$GLB, | Performed by: NEUROMUSCULOSKELETAL MEDICINE & OMM

## 2020-11-17 PROCEDURE — 99214 PR OFFICE/OUTPT VISIT, EST, LEVL IV, 30-39 MIN: ICD-10-PCS | Mod: HCNC,S$GLB,, | Performed by: NEUROMUSCULOSKELETAL MEDICINE & OMM

## 2020-11-17 PROCEDURE — 1126F AMNT PAIN NOTED NONE PRSNT: CPT | Mod: HCNC,S$GLB,, | Performed by: NEUROMUSCULOSKELETAL MEDICINE & OMM

## 2020-11-17 PROCEDURE — 1157F PR ADVANCE CARE PLAN OR EQUIV PRESENT IN MEDICAL RECORD: ICD-10-PCS | Mod: HCNC,S$GLB,, | Performed by: NEUROMUSCULOSKELETAL MEDICINE & OMM

## 2020-11-17 PROCEDURE — 3008F BODY MASS INDEX DOCD: CPT | Mod: HCNC,CPTII,S$GLB, | Performed by: NEUROMUSCULOSKELETAL MEDICINE & OMM

## 2020-11-17 PROCEDURE — 3079F PR MOST RECENT DIASTOLIC BLOOD PRESSURE 80-89 MM HG: ICD-10-PCS | Mod: HCNC,CPTII,S$GLB, | Performed by: NEUROMUSCULOSKELETAL MEDICINE & OMM

## 2020-11-17 RX ORDER — GABAPENTIN 100 MG/1
100 CAPSULE ORAL 3 TIMES DAILY
Qty: 90 CAPSULE | Refills: 3 | Status: SHIPPED | OUTPATIENT
Start: 2020-11-17 | End: 2021-05-25

## 2020-11-18 ENCOUNTER — OFFICE VISIT (OUTPATIENT)
Dept: PSYCHIATRY | Facility: CLINIC | Age: 70
End: 2020-11-18
Payer: MEDICARE

## 2020-11-18 DIAGNOSIS — F33.1 MAJOR DEPRESSIVE DISORDER, RECURRENT, MODERATE: Primary | ICD-10-CM

## 2020-11-18 DIAGNOSIS — F98.8 ATTENTION DEFICIT DISORDER (ADD) IN ADULT: ICD-10-CM

## 2020-11-18 PROCEDURE — 90834 PSYTX W PT 45 MINUTES: CPT | Mod: HCNC,95,, | Performed by: SOCIAL WORKER

## 2020-11-18 PROCEDURE — 1157F ADVNC CARE PLAN IN RCRD: CPT | Mod: HCNC,95,, | Performed by: SOCIAL WORKER

## 2020-11-18 PROCEDURE — 90834 PR PSYCHOTHERAPY W/PATIENT, 45 MIN: ICD-10-PCS | Mod: HCNC,95,, | Performed by: SOCIAL WORKER

## 2020-11-18 PROCEDURE — 1157F PR ADVANCE CARE PLAN OR EQUIV PRESENT IN MEDICAL RECORD: ICD-10-PCS | Mod: HCNC,95,, | Performed by: SOCIAL WORKER

## 2020-11-18 NOTE — PROGRESS NOTES
"The patient location is: [place]  The chief complaint leading to consultation is: depression  Visit type: Virtual visit with synchronous audio and video  Total time spent with patient: 45 minutes  Each patient to whom he or she provides medical services by telemedicine is:  (1) informed of the relationship between the physician and patient and the respective role of any other health care provider with respect to management of the patient; and (2) notified that he or she may decline to receive medical services by telemedicine and may withdraw from such care at any time.    Notes: Individual Psychotherapy (PhD/LCSW)    11/18/2020    Site: telemed    Therapeutic Intervention: Met with patient alone.  Outpatient - Insight oriented psychotherapy 45 min - CPT code 65706    Chief complaint/reason for encounter: depression, distractibility     Interval history and content of current session: Pt depressed, negative, "feeling sorry for myself", ambivalent about whether she wants attention from friends or wants to be left alone, whether she wants to work or not, filled with regrets about her life, feeling lonely and not wanting to make an effort to fix the problem. Agree pt has option to sit in her misery but pt gradually becomes more open to taking steps to feel better and notes several recent action accomplishments. Agree she will discuss meds with her psychiatrist at next appointment if she is not feeling better.    Treatment plan:  · Target symptoms: depression, distractibility  · Why chosen therapy is appropriate versus another modality: relevant to diagnosis  · Outcome monitoring methods: self-report  · Therapeutic intervention type: insight oriented psychotherapy    Risk parameters:  Patient reports no suicidal ideation  Patient reports no homicidal ideation  Patient reports no self-injurious behavior  Patient reports no violent behavior    Verbal deficits: None    Patient's response to intervention:  The patient's " response to intervention is motivated    Progress toward goals and other mental status changes:  The patient's progress toward goals is limited    Diagnosis: Major depression, recurrent, moderate,  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

## 2020-11-18 NOTE — PROGRESS NOTES
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  Present history:  Patient continues to complain of numbness of the feet and legs with balance problems.  She complains her feet are cold.  She has good pulses.  She has no family history since she was adopted.  She started a trial for sensory prosthesis and would like to purchase the gadget-Walkasin.  She was in the trial from January 2020 to September and felt that it did help the sensory neuropathy.      Previous note:  8-21-19   patient continues to have some difficulty walking.  She does note that looking at her feet improves her balance.  Blood work has been negative including B12 folate and protein electrophoresis.  She does not know her family history for diabetes since she was adopted.     Previous note:  6-25-19:  This is a 70-year-old white female referred for right by who presents with several episodes of falling dating back to 2012.  Patient was recently at a conference in May in [place] and fell angelita even more recently on May 31st fell again at which time she fractured her kneecap..  She states that she does not feel the floor like she think she should. In   2015 she had a bad bout of left sciatica lasting 6 months for which she eventually had an epidural steroid injection to cleared up.  History chronic renal disease possibly a source of neuropathy     Neurological Exam:  Mental status-alert and oriented to person, place, and time; attention span and concentration is good. Fund of knowledge-patient is aware of current events and able to give detailed history of the current problem.recent and remote memory seems intact. Language function is normal with no evidence of aphasia  Cranial nerves:Visual acuity to hand chart -normal; visual fields to confrontation normal;pupils were equal and reactive to light ;no evidence of ptosis ;  funduscopic examination was normal with sharp disc margins. external ocular movements were full with no  nystagmus. Facial sensation to pinprick : normal ; corneal reflexes intact; Facial muscles were symmetrical. Hearing is unimpaired symmetrical finger rub; Tongue movements - normal ; palate movements - normal ;Swallowing unimpaired. Shoulder shrug was intact with good strength Speech was normal  Motor examination: Upper : normal                                      Lower extremities -poor heel walk; hammertoes are evident.  tone was normal ;                  Right-handed  Sensory examination:   Upper; normal pinprick and soft touch ;   Lower extremities - normal and symmetrical.   Vibration sense:  Absent @ toes  Deep tendon reflexes: upper extremities :1-2+ symmetrical ;     lower extremities KJ- left -3 +; AJ - 1/2+ Both plantar responses were flexor  Cerebellar examination upper: Normal finger to nose and rapid alternating movements  Gait:  Limited gait due to knee fracture.    heel and toe walk normal  Romberg test:  Positive       Tandem gait:  ReportInvoluntary movements: Negative  TMJ - no tenderness; she has good distal pulses.  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  lumbar disc disease with sciatica in the past  Recommendations/Plan :  long discussion in terms of use of the sensory prosthesis.  If she feels that it will help would not hesitate to try it..  History significant weight loss possibly prediabetic.    no further workup at this time.  Follow-up 6 months

## 2020-11-23 ENCOUNTER — TELEPHONE (OUTPATIENT)
Dept: NEUROLOGY | Facility: CLINIC | Age: 70
End: 2020-11-23

## 2020-11-23 ENCOUNTER — OFFICE VISIT (OUTPATIENT)
Dept: INTERNAL MEDICINE | Facility: CLINIC | Age: 70
End: 2020-11-23
Payer: MEDICARE

## 2020-11-23 VITALS
WEIGHT: 159.38 LBS | HEIGHT: 68 IN | SYSTOLIC BLOOD PRESSURE: 130 MMHG | HEART RATE: 69 BPM | DIASTOLIC BLOOD PRESSURE: 80 MMHG | BODY MASS INDEX: 24.15 KG/M2 | OXYGEN SATURATION: 98 %

## 2020-11-23 DIAGNOSIS — E03.9 HYPOTHYROIDISM, UNSPECIFIED TYPE: ICD-10-CM

## 2020-11-23 DIAGNOSIS — R29.6 MULTIPLE FALLS: Primary | ICD-10-CM

## 2020-11-23 DIAGNOSIS — D64.9 ANEMIA, UNSPECIFIED TYPE: ICD-10-CM

## 2020-11-23 DIAGNOSIS — C26.9 MALIGNANT NEOPLASM OF ILL-DEFINED SITES WITHIN THE DIGESTIVE SYSTEM: ICD-10-CM

## 2020-11-23 DIAGNOSIS — R73.9 HYPERGLYCEMIA: ICD-10-CM

## 2020-11-23 DIAGNOSIS — E55.9 MILD VITAMIN D DEFICIENCY: ICD-10-CM

## 2020-11-23 PROCEDURE — 1157F ADVNC CARE PLAN IN RCRD: CPT | Mod: HCNC,S$GLB,, | Performed by: INTERNAL MEDICINE

## 2020-11-23 PROCEDURE — 99499 RISK ADDL DX/OHS AUDIT: ICD-10-PCS | Mod: S$GLB,,, | Performed by: INTERNAL MEDICINE

## 2020-11-23 PROCEDURE — 96372 THER/PROPH/DIAG INJ SC/IM: CPT | Mod: HCNC,S$GLB,, | Performed by: INTERNAL MEDICINE

## 2020-11-23 PROCEDURE — 1101F PT FALLS ASSESS-DOCD LE1/YR: CPT | Mod: HCNC,CPTII,S$GLB, | Performed by: INTERNAL MEDICINE

## 2020-11-23 PROCEDURE — 99499 UNLISTED E&M SERVICE: CPT | Mod: S$GLB,,, | Performed by: INTERNAL MEDICINE

## 2020-11-23 PROCEDURE — 3008F PR BODY MASS INDEX (BMI) DOCUMENTED: ICD-10-PCS | Mod: HCNC,CPTII,S$GLB, | Performed by: INTERNAL MEDICINE

## 2020-11-23 PROCEDURE — 1101F PR PT FALLS ASSESS DOC 0-1 FALLS W/OUT INJ PAST YR: ICD-10-PCS | Mod: HCNC,CPTII,S$GLB, | Performed by: INTERNAL MEDICINE

## 2020-11-23 PROCEDURE — 3008F BODY MASS INDEX DOCD: CPT | Mod: HCNC,CPTII,S$GLB, | Performed by: INTERNAL MEDICINE

## 2020-11-23 PROCEDURE — 96372 PR INJECTION,THERAP/PROPH/DIAG2ST, IM OR SUBCUT: ICD-10-PCS | Mod: HCNC,S$GLB,, | Performed by: INTERNAL MEDICINE

## 2020-11-23 PROCEDURE — 1125F PR PAIN SEVERITY QUANTIFIED, PAIN PRESENT: ICD-10-PCS | Mod: HCNC,S$GLB,, | Performed by: INTERNAL MEDICINE

## 2020-11-23 PROCEDURE — 99214 PR OFFICE/OUTPT VISIT, EST, LEVL IV, 30-39 MIN: ICD-10-PCS | Mod: 25,HCNC,S$GLB, | Performed by: INTERNAL MEDICINE

## 2020-11-23 PROCEDURE — 3288F FALL RISK ASSESSMENT DOCD: CPT | Mod: HCNC,CPTII,S$GLB, | Performed by: INTERNAL MEDICINE

## 2020-11-23 PROCEDURE — 3075F PR MOST RECENT SYSTOLIC BLOOD PRESS GE 130-139MM HG: ICD-10-PCS | Mod: HCNC,CPTII,S$GLB, | Performed by: INTERNAL MEDICINE

## 2020-11-23 PROCEDURE — 3079F PR MOST RECENT DIASTOLIC BLOOD PRESSURE 80-89 MM HG: ICD-10-PCS | Mod: HCNC,CPTII,S$GLB, | Performed by: INTERNAL MEDICINE

## 2020-11-23 PROCEDURE — 1159F PR MEDICATION LIST DOCUMENTED IN MEDICAL RECORD: ICD-10-PCS | Mod: HCNC,S$GLB,, | Performed by: INTERNAL MEDICINE

## 2020-11-23 PROCEDURE — 3075F SYST BP GE 130 - 139MM HG: CPT | Mod: HCNC,CPTII,S$GLB, | Performed by: INTERNAL MEDICINE

## 2020-11-23 PROCEDURE — 3288F PR FALLS RISK ASSESSMENT DOCUMENTED: ICD-10-PCS | Mod: HCNC,CPTII,S$GLB, | Performed by: INTERNAL MEDICINE

## 2020-11-23 PROCEDURE — 99999 PR PBB SHADOW E&M-EST. PATIENT-LVL V: CPT | Mod: PBBFAC,HCNC,, | Performed by: INTERNAL MEDICINE

## 2020-11-23 PROCEDURE — 3079F DIAST BP 80-89 MM HG: CPT | Mod: HCNC,CPTII,S$GLB, | Performed by: INTERNAL MEDICINE

## 2020-11-23 PROCEDURE — 1125F AMNT PAIN NOTED PAIN PRSNT: CPT | Mod: HCNC,S$GLB,, | Performed by: INTERNAL MEDICINE

## 2020-11-23 PROCEDURE — 1157F PR ADVANCE CARE PLAN OR EQUIV PRESENT IN MEDICAL RECORD: ICD-10-PCS | Mod: HCNC,S$GLB,, | Performed by: INTERNAL MEDICINE

## 2020-11-23 PROCEDURE — 99999 PR PBB SHADOW E&M-EST. PATIENT-LVL V: ICD-10-PCS | Mod: PBBFAC,HCNC,, | Performed by: INTERNAL MEDICINE

## 2020-11-23 PROCEDURE — 99214 OFFICE O/P EST MOD 30 MIN: CPT | Mod: 25,HCNC,S$GLB, | Performed by: INTERNAL MEDICINE

## 2020-11-23 PROCEDURE — 1159F MED LIST DOCD IN RCRD: CPT | Mod: HCNC,S$GLB,, | Performed by: INTERNAL MEDICINE

## 2020-11-23 RX ORDER — TRIAMCINOLONE ACETONIDE 40 MG/ML
40 INJECTION, SUSPENSION INTRA-ARTICULAR; INTRAMUSCULAR ONCE
Status: COMPLETED | OUTPATIENT
Start: 2020-11-23 | End: 2020-11-23

## 2020-11-23 RX ORDER — TIZANIDINE 4 MG/1
TABLET ORAL
Qty: 30 TABLET | Refills: 0 | Status: SHIPPED | OUTPATIENT
Start: 2020-11-23 | End: 2021-05-25

## 2020-11-23 RX ADMIN — TRIAMCINOLONE ACETONIDE 40 MG: 40 INJECTION, SUSPENSION INTRA-ARTICULAR; INTRAMUSCULAR at 01:11

## 2020-11-23 NOTE — PROGRESS NOTES
Subjective:       Patient ID: Manisha Wynne is a 70 y.o. female.    Chief Complaint: Annual Exam    HPIPt is doing better - no recent falls.  Weight has stabilized - no CP or SOB.  Has some sciatic pain that occurred in the last few days. Down the left leg - no numbness or weakness.   Review of Systems   Constitutional: Negative for activity change and unexpected weight change.   HENT: Positive for hearing loss. Negative for rhinorrhea and trouble swallowing.    Eyes: Negative for discharge and visual disturbance.   Respiratory: Negative for chest tightness, shortness of breath (PND or orthopnea) and wheezing.    Cardiovascular: Negative for chest pain and palpitations.   Gastrointestinal: Negative for abdominal pain, blood in stool, constipation, diarrhea, nausea and vomiting.   Endocrine: Negative for polydipsia and polyuria.   Genitourinary: Negative for difficulty urinating, dysuria, hematuria and menstrual problem.   Musculoskeletal: Negative for arthralgias, joint swelling and neck pain.   Neurological: Positive for weakness. Negative for seizures, syncope and headaches.   Psychiatric/Behavioral: Positive for dysphoric mood. Negative for confusion.       Objective:      Physical Exam  Constitutional:       General: She is not in acute distress.     Appearance: She is well-developed.   HENT:      Head: Normocephalic.   Neck:      Musculoskeletal: Neck supple.      Thyroid: No thyromegaly.      Vascular: No JVD.   Cardiovascular:      Rate and Rhythm: Normal rate and regular rhythm.      Heart sounds: Normal heart sounds. No murmur. No friction rub. No gallop.    Pulmonary:      Effort: Pulmonary effort is normal.      Breath sounds: Normal breath sounds. No wheezing or rales.   Abdominal:      General: Bowel sounds are normal. There is no distension.      Palpations: Abdomen is soft. There is no mass.      Tenderness: There is no abdominal tenderness. There is no guarding or rebound.    Lymphadenopathy:      Cervical: No cervical adenopathy.   Skin:     General: Skin is warm and dry.   Neurological:      Mental Status: She is alert and oriented to person, place, and time.      Deep Tendon Reflexes: Reflexes are normal and symmetric.   Psychiatric:         Behavior: Behavior normal.         Thought Content: Thought content normal.         Judgment: Judgment normal.         Assessment:       1. Multiple falls    2. Malignant neoplasm of ill-defined sites within the digestive system    3. Anemia, unspecified type    4. Mild vitamin D deficiency    5. Hyperglycemia    6. Hypothyroidism, unspecified type        Plan:   Multiple falls  -     Ambulatory referral/consult to Physical Medicine Rehab; Future; Expected date: 11/30/2020  -     Ambulatory referral/consult to Neurology; Future; Expected date: 11/30/2020    Malignant neoplasm of ill-defined sites within the digestive system  -     CT Chest Abdomen Pelvis W W/O Contrast (XPD); Future; Expected date: 11/23/2020    Anemia, unspecified type  -     CBC Auto Differential; Future; Expected date: 11/23/2020  -     Iron and TIBC; Future; Expected date: 11/23/2020  -     Ferritin; Future; Expected date: 11/23/2020    Mild vitamin D deficiency  -     Vitamin D; Future; Expected date: 11/23/2020    Hyperglycemia  -     Hemoglobin A1C; Future; Expected date: 11/23/2020    Hypothyroidism, unspecified type  -     TSH; Future; Expected date: 11/23/2020    L leg sciatica   -     triamcinolone acetonide injection 40 mg  -     tiZANidine (ZANAFLEX) 4 MG tablet; One tablet every 8 hours as needed for back pain/spasm  Dispense: 30 tablet; Refill: 0        Answers for HPI/ROS submitted by the patient on 11/22/2020   activity change: No  unexpected weight change: No  neck pain: No  hearing loss: Yes  rhinorrhea: No  trouble swallowing: No  eye discharge: No  visual disturbance: No  chest tightness: No  wheezing: No  chest pain: No  palpitations: No  blood in stool:  No  constipation: No  vomiting: No  diarrhea: No  polydipsia: No  polyuria: No  difficulty urinating: No  hematuria: No  menstrual problem: No  dysuria: No  joint swelling: No  arthralgias: No  headaches: No  weakness: Yes  confusion: No  dysphoric mood: Yes

## 2020-11-24 NOTE — TELEPHONE ENCOUNTER
Pt returned call. She does not want to see the same provider. Would like a new provider. Appt scheduled and confirmed.

## 2020-11-30 ENCOUNTER — OFFICE VISIT (OUTPATIENT)
Dept: PSYCHIATRY | Facility: CLINIC | Age: 70
End: 2020-11-30
Payer: MEDICARE

## 2020-11-30 DIAGNOSIS — F98.8 ATTENTION DEFICIT DISORDER (ADD) IN ADULT: ICD-10-CM

## 2020-11-30 DIAGNOSIS — F33.1 MAJOR DEPRESSIVE DISORDER, RECURRENT, MODERATE: Primary | ICD-10-CM

## 2020-11-30 PROCEDURE — 90834 PR PSYCHOTHERAPY W/PATIENT, 45 MIN: ICD-10-PCS | Mod: HCNC,95,, | Performed by: SOCIAL WORKER

## 2020-11-30 PROCEDURE — 1157F ADVNC CARE PLAN IN RCRD: CPT | Mod: HCNC,95,, | Performed by: SOCIAL WORKER

## 2020-11-30 PROCEDURE — 1157F PR ADVANCE CARE PLAN OR EQUIV PRESENT IN MEDICAL RECORD: ICD-10-PCS | Mod: HCNC,95,, | Performed by: SOCIAL WORKER

## 2020-11-30 PROCEDURE — 90834 PSYTX W PT 45 MINUTES: CPT | Mod: HCNC,95,, | Performed by: SOCIAL WORKER

## 2020-11-30 NOTE — PROGRESS NOTES
"The patient location is: [place]  The chief complaint leading to consultation is: depression  Visit type: Virtual visit with synchronous audio and video  Total time spent with patient: 45 minutes  Each patient to whom he or she provides medical services by telemedicine is:  (1) informed of the relationship between the physician and patient and the respective role of any other health care provider with respect to management of the patient; and (2) notified that he or she may decline to receive medical services by telemedicine and may withdraw from such care at any time.    Notes: Individual Psychotherapy (PhD/LCSW)    11/30/2020    Site: telemed    Therapeutic Intervention: Met with patient alone.  Outpatient - Insight oriented psychotherapy 45 min - CPT code 59252    Chief complaint/reason for encounter: depression, distractibility     Interval history and content of current session: Pt was in bed and just noticed time at 11am, in time to log on for her appointment, in pajamas and unkempt. Pt reports not getting things done, remains negative and hopeless, passive. She hurt her back and is having sciatica, has seen doctor for this. Ask pt to identify times in her life when she has completed things, ie college, KEV[employer] and rabbinnic degree, but pt has difficulty taking credit for these, feels she always "just gets by." Invite pt to consider whether changing her view of herself in regards to capability might be helpful.    Treatment plan:  · Target symptoms: depression, distractibility  · Why chosen therapy is appropriate versus another modality: relevant to diagnosis  · Outcome monitoring methods: self-report  · Therapeutic intervention type: insight oriented psychotherapy    Risk parameters:  Patient reports no suicidal ideation  Patient reports no homicidal ideation  Patient reports no self-injurious behavior  Patient reports no violent behavior    Verbal deficits: None    Patient's response to intervention:  The " patient's response to intervention is passive    Progress toward goals and other mental status changes:  The patient's progress toward goals is limited    Diagnosis: Major depression, recurrent, moderate,  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

## 2020-12-01 ENCOUNTER — LAB VISIT (OUTPATIENT)
Dept: LAB | Facility: HOSPITAL | Age: 70
End: 2020-12-01
Attending: INTERNAL MEDICINE
Payer: MEDICARE

## 2020-12-01 ENCOUNTER — OFFICE VISIT (OUTPATIENT)
Dept: OTOLARYNGOLOGY | Facility: CLINIC | Age: 70
End: 2020-12-01
Payer: MEDICARE

## 2020-12-01 VITALS
SYSTOLIC BLOOD PRESSURE: 136 MMHG | HEIGHT: 68 IN | HEART RATE: 78 BPM | WEIGHT: 164.38 LBS | DIASTOLIC BLOOD PRESSURE: 79 MMHG | BODY MASS INDEX: 24.91 KG/M2

## 2020-12-01 DIAGNOSIS — Z98.890 POST-OPERATIVE STATE: ICD-10-CM

## 2020-12-01 DIAGNOSIS — M95.0 NASAL DEFORMITY, ACQUIRED: Primary | ICD-10-CM

## 2020-12-01 DIAGNOSIS — E21.0 PRIMARY HYPERPARATHYROIDISM: ICD-10-CM

## 2020-12-01 LAB
ALBUMIN SERPL BCP-MCNC: 3.9 G/DL (ref 3.5–5.2)
ANION GAP SERPL CALC-SCNC: 7 MMOL/L (ref 8–16)
BUN SERPL-MCNC: 29 MG/DL (ref 8–23)
CALCIUM SERPL-MCNC: 10 MG/DL (ref 8.7–10.5)
CHLORIDE SERPL-SCNC: 104 MMOL/L (ref 95–110)
CO2 SERPL-SCNC: 29 MMOL/L (ref 23–29)
CREAT SERPL-MCNC: 1.1 MG/DL (ref 0.5–1.4)
EST. GFR  (AFRICAN AMERICAN): 58 ML/MIN/1.73 M^2
EST. GFR  (NON AFRICAN AMERICAN): 50.3 ML/MIN/1.73 M^2
GLUCOSE SERPL-MCNC: 78 MG/DL (ref 70–110)
PHOSPHATE SERPL-MCNC: 3.5 MG/DL (ref 2.7–4.5)
POTASSIUM SERPL-SCNC: 5 MMOL/L (ref 3.5–5.1)
PTH-INTACT SERPL-MCNC: 43 PG/ML (ref 9–77)
SODIUM SERPL-SCNC: 140 MMOL/L (ref 136–145)

## 2020-12-01 PROCEDURE — 1126F AMNT PAIN NOTED NONE PRSNT: CPT | Mod: S$GLB,,, | Performed by: OTOLARYNGOLOGY

## 2020-12-01 PROCEDURE — 1101F PT FALLS ASSESS-DOCD LE1/YR: CPT | Mod: CPTII,S$GLB,, | Performed by: OTOLARYNGOLOGY

## 2020-12-01 PROCEDURE — 83970 ASSAY OF PARATHORMONE: CPT | Mod: HCNC

## 2020-12-01 PROCEDURE — 1126F PR PAIN SEVERITY QUANTIFIED, NO PAIN PRESENT: ICD-10-PCS | Mod: S$GLB,,, | Performed by: OTOLARYNGOLOGY

## 2020-12-01 PROCEDURE — 3288F PR FALLS RISK ASSESSMENT DOCUMENTED: ICD-10-PCS | Mod: CPTII,S$GLB,, | Performed by: OTOLARYNGOLOGY

## 2020-12-01 PROCEDURE — 1101F PR PT FALLS ASSESS DOC 0-1 FALLS W/OUT INJ PAST YR: ICD-10-PCS | Mod: CPTII,S$GLB,, | Performed by: OTOLARYNGOLOGY

## 2020-12-01 PROCEDURE — 3008F PR BODY MASS INDEX (BMI) DOCUMENTED: ICD-10-PCS | Mod: CPTII,S$GLB,, | Performed by: OTOLARYNGOLOGY

## 2020-12-01 PROCEDURE — 36415 COLL VENOUS BLD VENIPUNCTURE: CPT | Mod: HCNC,PO

## 2020-12-01 PROCEDURE — 99024 POSTOP FOLLOW-UP VISIT: CPT | Mod: S$GLB,,, | Performed by: OTOLARYNGOLOGY

## 2020-12-01 PROCEDURE — 3288F FALL RISK ASSESSMENT DOCD: CPT | Mod: CPTII,S$GLB,, | Performed by: OTOLARYNGOLOGY

## 2020-12-01 PROCEDURE — 1157F PR ADVANCE CARE PLAN OR EQUIV PRESENT IN MEDICAL RECORD: ICD-10-PCS | Mod: S$GLB,,, | Performed by: OTOLARYNGOLOGY

## 2020-12-01 PROCEDURE — 80069 RENAL FUNCTION PANEL: CPT | Mod: HCNC

## 2020-12-01 PROCEDURE — 3008F BODY MASS INDEX DOCD: CPT | Mod: CPTII,S$GLB,, | Performed by: OTOLARYNGOLOGY

## 2020-12-01 PROCEDURE — 99024 PR POST-OP FOLLOW-UP VISIT: ICD-10-PCS | Mod: S$GLB,,, | Performed by: OTOLARYNGOLOGY

## 2020-12-01 PROCEDURE — 1157F ADVNC CARE PLAN IN RCRD: CPT | Mod: S$GLB,,, | Performed by: OTOLARYNGOLOGY

## 2020-12-01 NOTE — PROGRESS NOTES
One year S/P nasal reconstruction with bilateral auricular cartilage batten grafts,septo,turbs.  Doing well stating she has a markedly improved nasal airway.  She does C/O of some nasal congestion occasionally at night. She does state she has cats living inside.   Septum straight,valves well supported.   Ears well healed.  Plan: Cont. Saline spray/gel  Add saline sinus rinses at night before bed.  I have given her literature on this including its use with distilled water and described how it is to be used in detail.  Photos  RTC prn.

## 2020-12-07 ENCOUNTER — PATIENT MESSAGE (OUTPATIENT)
Dept: ENDOCRINOLOGY | Facility: CLINIC | Age: 70
End: 2020-12-07

## 2020-12-07 ENCOUNTER — PATIENT MESSAGE (OUTPATIENT)
Dept: INTERNAL MEDICINE | Facility: CLINIC | Age: 70
End: 2020-12-07

## 2020-12-07 ENCOUNTER — OFFICE VISIT (OUTPATIENT)
Dept: PSYCHIATRY | Facility: CLINIC | Age: 70
End: 2020-12-07
Payer: MEDICARE

## 2020-12-07 DIAGNOSIS — F33.41 MAJOR DEPRESSIVE DISORDER, RECURRENT EPISODE, IN PARTIAL REMISSION: Primary | ICD-10-CM

## 2020-12-07 DIAGNOSIS — E21.0 PRIMARY HYPERPARATHYROIDISM: Primary | ICD-10-CM

## 2020-12-07 DIAGNOSIS — F98.8 ATTENTION DEFICIT DISORDER (ADD) IN ADULT: ICD-10-CM

## 2020-12-07 PROCEDURE — 1157F PR ADVANCE CARE PLAN OR EQUIV PRESENT IN MEDICAL RECORD: ICD-10-PCS | Mod: HCNC,95,, | Performed by: SOCIAL WORKER

## 2020-12-07 PROCEDURE — 90834 PSYTX W PT 45 MINUTES: CPT | Mod: HCNC,95,, | Performed by: SOCIAL WORKER

## 2020-12-07 PROCEDURE — 90834 PR PSYCHOTHERAPY W/PATIENT, 45 MIN: ICD-10-PCS | Mod: HCNC,95,, | Performed by: SOCIAL WORKER

## 2020-12-07 PROCEDURE — 1157F ADVNC CARE PLAN IN RCRD: CPT | Mod: HCNC,95,, | Performed by: SOCIAL WORKER

## 2020-12-07 NOTE — PROGRESS NOTES
"The patient location is: [place]  The chief complaint leading to consultation is: depression  Visit type: Virtual visit with synchronous audio and video  Total time spent with patient: 45 minutes  Each patient to whom he or she provides medical services by telemedicine is:  (1) informed of the relationship between the physician and patient and the respective role of any other health care provider with respect to management of the patient; and (2) notified that he or she may decline to receive medical services by telemedicine and may withdraw from such care at any time.    Notes: Individual Psychotherapy (PhD/LCSW)    12/7/2020    Site: telemed    Therapeutic Intervention: Met with patient alone.  Outpatient - Insight oriented psychotherapy 45 min - CPT code 21378    Chief complaint/reason for encounter: depression, distractibility     Interval history and content of current session: Pt's mood is better, and she has taken some active steps to manage her life, feels good about this. She is in bed, but has been up and had breakfast and is on time for appointment. Discuss "addiction" to playing games and puzzles. Pt has begun making an effort to put things back in place, but does not praise herself for this. Strongly urge pt to recognize each step as a step toward gaining control of her life and to be proud of herself as she takes these steps. Pt is receptive. She is having a lot f pain and is going to request visit with physical medicine t get relief.    Treatment plan:  · Target symptoms: depression, distractibility  · Why chosen therapy is appropriate versus another modality: relevant to diagnosis  · Outcome monitoring methods: self-report  · Therapeutic intervention type: insight oriented psychotherapy    Risk parameters:  Patient reports no suicidal ideation  Patient reports no homicidal ideation  Patient reports no self-injurious behavior  Patient reports no violent behavior    Verbal deficits: None    Patient's " response to intervention:  The patient's response to intervention is motivated    Progress toward goals and other mental status changes:  The patient's progress toward goals is fair    Diagnosis: Major depression, recurrent, in partial remission  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

## 2020-12-21 ENCOUNTER — OFFICE VISIT (OUTPATIENT)
Dept: PSYCHIATRY | Facility: CLINIC | Age: 70
End: 2020-12-21
Payer: MEDICARE

## 2020-12-21 DIAGNOSIS — F98.8 ATTENTION DEFICIT DISORDER (ADD) IN ADULT: ICD-10-CM

## 2020-12-21 DIAGNOSIS — F33.41 MAJOR DEPRESSIVE DISORDER, RECURRENT EPISODE, IN PARTIAL REMISSION: Primary | ICD-10-CM

## 2020-12-21 PROCEDURE — 90834 PR PSYCHOTHERAPY W/PATIENT, 45 MIN: ICD-10-PCS | Mod: HCNC,95,, | Performed by: SOCIAL WORKER

## 2020-12-21 PROCEDURE — 1157F ADVNC CARE PLAN IN RCRD: CPT | Mod: HCNC,95,, | Performed by: SOCIAL WORKER

## 2020-12-21 PROCEDURE — 90834 PSYTX W PT 45 MINUTES: CPT | Mod: HCNC,95,, | Performed by: SOCIAL WORKER

## 2020-12-21 PROCEDURE — 1157F PR ADVANCE CARE PLAN OR EQUIV PRESENT IN MEDICAL RECORD: ICD-10-PCS | Mod: HCNC,95,, | Performed by: SOCIAL WORKER

## 2020-12-21 NOTE — PROGRESS NOTES
"The patient location is: [place]  The chief complaint leading to consultation is: depression  Visit type: Virtual visit with synchronous audio and video  Total time spent with patient: 45 minutes  Each patient to whom he or she provides medical services by telemedicine is:  (1) informed of the relationship between the physician and patient and the respective role of any other health care provider with respect to management of the patient; and (2) notified that he or she may decline to receive medical services by telemedicine and may withdraw from such care at any time.    Notes: Individual Psychotherapy (PhD/LCSW)    12/21/2020    Site: telemed    Therapeutic Intervention: Met with patient alone.  Outpatient - Insight oriented psychotherapy 45 min - CPT code 16798    Chief complaint/reason for encounter: depression, distractibility     Interval history and content of current session: Pt doing well. She is in bed, indicates she just woke up, but reports she had a very nice Hanukkah, connected with friends via Zoom, and lit candles "for myself" when not connecting. She is being kinder to herself and more validating, focusing on the things she is accomplishing or that are going well, and decreasing her agitation about things that don't go well.     Treatment plan:  · Target symptoms: depression, distractibility  · Why chosen therapy is appropriate versus another modality: relevant to diagnosis  · Outcome monitoring methods: self-report  · Therapeutic intervention type: insight oriented psychotherapy    Risk parameters:  Patient reports no suicidal ideation  Patient reports no homicidal ideation  Patient reports no self-injurious behavior  Patient reports no violent behavior    Verbal deficits: None    Patient's response to intervention:  The patient's response to intervention is motivated    Progress toward goals and other mental status changes:  The patient's progress toward goals is good    Diagnosis: Major " depression, recurrent, in partial remission  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

## 2020-12-22 ENCOUNTER — PATIENT MESSAGE (OUTPATIENT)
Dept: CARDIOLOGY | Facility: CLINIC | Age: 70
End: 2020-12-22

## 2020-12-22 DIAGNOSIS — E78.2 MIXED HYPERLIPIDEMIA: ICD-10-CM

## 2020-12-22 RX ORDER — PRAVASTATIN SODIUM 20 MG/1
20 TABLET ORAL DAILY
Qty: 90 TABLET | Refills: 3 | Status: SHIPPED | OUTPATIENT
Start: 2020-12-22 | End: 2021-12-20

## 2021-01-04 ENCOUNTER — OFFICE VISIT (OUTPATIENT)
Dept: PSYCHIATRY | Facility: CLINIC | Age: 71
End: 2021-01-04
Payer: MEDICARE

## 2021-01-04 DIAGNOSIS — F33.41 MAJOR DEPRESSIVE DISORDER, RECURRENT EPISODE, IN PARTIAL REMISSION: Primary | ICD-10-CM

## 2021-01-04 DIAGNOSIS — F98.8 ATTENTION DEFICIT DISORDER (ADD) IN ADULT: ICD-10-CM

## 2021-01-04 PROCEDURE — 90834 PSYTX W PT 45 MINUTES: CPT | Mod: HCNC,95,, | Performed by: SOCIAL WORKER

## 2021-01-04 PROCEDURE — 90834 PR PSYCHOTHERAPY W/PATIENT, 45 MIN: ICD-10-PCS | Mod: HCNC,95,, | Performed by: SOCIAL WORKER

## 2021-01-04 PROCEDURE — 1157F ADVNC CARE PLAN IN RCRD: CPT | Mod: HCNC,95,, | Performed by: SOCIAL WORKER

## 2021-01-04 PROCEDURE — 1157F PR ADVANCE CARE PLAN OR EQUIV PRESENT IN MEDICAL RECORD: ICD-10-PCS | Mod: HCNC,95,, | Performed by: SOCIAL WORKER

## 2021-01-07 ENCOUNTER — IMMUNIZATION (OUTPATIENT)
Dept: PHARMACY | Facility: CLINIC | Age: 71
End: 2021-01-07

## 2021-01-07 DIAGNOSIS — Z23 NEED FOR VACCINATION: ICD-10-CM

## 2021-01-11 ENCOUNTER — TELEPHONE (OUTPATIENT)
Dept: PAIN MEDICINE | Facility: CLINIC | Age: 71
End: 2021-01-11

## 2021-01-12 ENCOUNTER — OFFICE VISIT (OUTPATIENT)
Dept: PAIN MEDICINE | Facility: CLINIC | Age: 71
End: 2021-01-12
Attending: ANESTHESIOLOGY
Payer: MEDICARE

## 2021-01-12 VITALS
RESPIRATION RATE: 18 BRPM | HEIGHT: 68 IN | OXYGEN SATURATION: 100 % | TEMPERATURE: 97 F | HEART RATE: 73 BPM | WEIGHT: 158.75 LBS | SYSTOLIC BLOOD PRESSURE: 123 MMHG | BODY MASS INDEX: 24.06 KG/M2 | DIASTOLIC BLOOD PRESSURE: 76 MMHG

## 2021-01-12 DIAGNOSIS — M54.9 DORSALGIA, UNSPECIFIED: ICD-10-CM

## 2021-01-12 DIAGNOSIS — M47.26 OSTEOARTHRITIS OF SPINE WITH RADICULOPATHY, LUMBAR REGION: Primary | ICD-10-CM

## 2021-01-12 PROCEDURE — 99999 PR PBB SHADOW E&M-EST. PATIENT-LVL V: ICD-10-PCS | Mod: PBBFAC,,, | Performed by: ANESTHESIOLOGY

## 2021-01-12 PROCEDURE — 3008F PR BODY MASS INDEX (BMI) DOCUMENTED: ICD-10-PCS | Mod: CPTII,S$GLB,, | Performed by: ANESTHESIOLOGY

## 2021-01-12 PROCEDURE — 1100F PR PT FALLS ASSESS DOC 2+ FALLS/FALL W/INJURY/YR: ICD-10-PCS | Mod: CPTII,S$GLB,, | Performed by: ANESTHESIOLOGY

## 2021-01-12 PROCEDURE — 1100F PTFALLS ASSESS-DOCD GE2>/YR: CPT | Mod: CPTII,S$GLB,, | Performed by: ANESTHESIOLOGY

## 2021-01-12 PROCEDURE — 99204 PR OFFICE/OUTPT VISIT, NEW, LEVL IV, 45-59 MIN: ICD-10-PCS | Mod: S$GLB,,, | Performed by: ANESTHESIOLOGY

## 2021-01-12 PROCEDURE — 1157F PR ADVANCE CARE PLAN OR EQUIV PRESENT IN MEDICAL RECORD: ICD-10-PCS | Mod: S$GLB,,, | Performed by: ANESTHESIOLOGY

## 2021-01-12 PROCEDURE — 3288F FALL RISK ASSESSMENT DOCD: CPT | Mod: CPTII,S$GLB,, | Performed by: ANESTHESIOLOGY

## 2021-01-12 PROCEDURE — 3078F PR MOST RECENT DIASTOLIC BLOOD PRESSURE < 80 MM HG: ICD-10-PCS | Mod: CPTII,S$GLB,, | Performed by: ANESTHESIOLOGY

## 2021-01-12 PROCEDURE — 3078F DIAST BP <80 MM HG: CPT | Mod: CPTII,S$GLB,, | Performed by: ANESTHESIOLOGY

## 2021-01-12 PROCEDURE — 1125F PR PAIN SEVERITY QUANTIFIED, PAIN PRESENT: ICD-10-PCS | Mod: S$GLB,,, | Performed by: ANESTHESIOLOGY

## 2021-01-12 PROCEDURE — 99204 OFFICE O/P NEW MOD 45 MIN: CPT | Mod: S$GLB,,, | Performed by: ANESTHESIOLOGY

## 2021-01-12 PROCEDURE — 3288F PR FALLS RISK ASSESSMENT DOCUMENTED: ICD-10-PCS | Mod: CPTII,S$GLB,, | Performed by: ANESTHESIOLOGY

## 2021-01-12 PROCEDURE — 3008F BODY MASS INDEX DOCD: CPT | Mod: CPTII,S$GLB,, | Performed by: ANESTHESIOLOGY

## 2021-01-12 PROCEDURE — 1125F AMNT PAIN NOTED PAIN PRSNT: CPT | Mod: S$GLB,,, | Performed by: ANESTHESIOLOGY

## 2021-01-12 PROCEDURE — 3074F SYST BP LT 130 MM HG: CPT | Mod: CPTII,S$GLB,, | Performed by: ANESTHESIOLOGY

## 2021-01-12 PROCEDURE — 99999 PR PBB SHADOW E&M-EST. PATIENT-LVL V: CPT | Mod: PBBFAC,,, | Performed by: ANESTHESIOLOGY

## 2021-01-12 PROCEDURE — 1159F MED LIST DOCD IN RCRD: CPT | Mod: S$GLB,,, | Performed by: ANESTHESIOLOGY

## 2021-01-12 PROCEDURE — 3074F PR MOST RECENT SYSTOLIC BLOOD PRESSURE < 130 MM HG: ICD-10-PCS | Mod: CPTII,S$GLB,, | Performed by: ANESTHESIOLOGY

## 2021-01-12 PROCEDURE — 1157F ADVNC CARE PLAN IN RCRD: CPT | Mod: S$GLB,,, | Performed by: ANESTHESIOLOGY

## 2021-01-12 PROCEDURE — 1159F PR MEDICATION LIST DOCUMENTED IN MEDICAL RECORD: ICD-10-PCS | Mod: S$GLB,,, | Performed by: ANESTHESIOLOGY

## 2021-01-14 ENCOUNTER — HOSPITAL ENCOUNTER (OUTPATIENT)
Dept: RADIOLOGY | Facility: OTHER | Age: 71
Discharge: HOME | End: 2021-01-14
Attending: ANESTHESIOLOGY
Payer: MEDICARE

## 2021-01-14 DIAGNOSIS — M54.9 DORSALGIA, UNSPECIFIED: ICD-10-CM

## 2021-01-14 PROCEDURE — 72110 XR LUMBAR SPINE 5 VIEW WITH FLEX AND EXT: ICD-10-PCS | Mod: 26,,, | Performed by: RADIOLOGY

## 2021-01-14 PROCEDURE — 72110 X-RAY EXAM L-2 SPINE 4/>VWS: CPT | Mod: 26,,, | Performed by: RADIOLOGY

## 2021-01-14 PROCEDURE — 72110 X-RAY EXAM L-2 SPINE 4/>VWS: CPT | Mod: TC,FY

## 2021-01-19 ENCOUNTER — PATIENT MESSAGE (OUTPATIENT)
Dept: NEUROLOGY | Facility: CLINIC | Age: 71
End: 2021-01-19

## 2021-01-20 ENCOUNTER — OFFICE VISIT (OUTPATIENT)
Dept: NEUROLOGY | Facility: CLINIC | Age: 71
End: 2021-01-20
Payer: MEDICARE

## 2021-01-20 ENCOUNTER — LAB VISIT (OUTPATIENT)
Dept: LAB | Facility: OTHER | Age: 71
End: 2021-01-20
Attending: PSYCHIATRY & NEUROLOGY
Payer: MEDICARE

## 2021-01-20 VITALS
HEIGHT: 68 IN | HEART RATE: 69 BPM | BODY MASS INDEX: 23.93 KG/M2 | DIASTOLIC BLOOD PRESSURE: 65 MMHG | WEIGHT: 157.88 LBS | SYSTOLIC BLOOD PRESSURE: 127 MMHG

## 2021-01-20 DIAGNOSIS — G60.3 IDIOPATHIC PROGRESSIVE POLYNEUROPATHY: Primary | ICD-10-CM

## 2021-01-20 DIAGNOSIS — R29.6 MULTIPLE FALLS: ICD-10-CM

## 2021-01-20 DIAGNOSIS — G60.3 IDIOPATHIC PROGRESSIVE POLYNEUROPATHY: ICD-10-CM

## 2021-01-20 DIAGNOSIS — M48.02 SPINAL STENOSIS, CERVICAL REGION: ICD-10-CM

## 2021-01-20 LAB — ERYTHROCYTE [SEDIMENTATION RATE] IN BLOOD: 20 MM/HR (ref 0–20)

## 2021-01-20 PROCEDURE — 1159F PR MEDICATION LIST DOCUMENTED IN MEDICAL RECORD: ICD-10-PCS | Mod: S$GLB,,, | Performed by: PSYCHIATRY & NEUROLOGY

## 2021-01-20 PROCEDURE — 1157F PR ADVANCE CARE PLAN OR EQUIV PRESENT IN MEDICAL RECORD: ICD-10-PCS | Mod: S$GLB,,, | Performed by: PSYCHIATRY & NEUROLOGY

## 2021-01-20 PROCEDURE — 1101F PR PT FALLS ASSESS DOC 0-1 FALLS W/OUT INJ PAST YR: ICD-10-PCS | Mod: CPTII,S$GLB,, | Performed by: PSYCHIATRY & NEUROLOGY

## 2021-01-20 PROCEDURE — 82726 LONG CHAIN FATTY ACIDS: CPT

## 2021-01-20 PROCEDURE — 99499 UNLISTED E&M SERVICE: CPT | Mod: S$GLB,,, | Performed by: PSYCHIATRY & NEUROLOGY

## 2021-01-20 PROCEDURE — 84425 ASSAY OF VITAMIN B-1: CPT

## 2021-01-20 PROCEDURE — 36415 COLL VENOUS BLD VENIPUNCTURE: CPT

## 2021-01-20 PROCEDURE — 3074F SYST BP LT 130 MM HG: CPT | Mod: CPTII,S$GLB,, | Performed by: PSYCHIATRY & NEUROLOGY

## 2021-01-20 PROCEDURE — 83520 IMMUNOASSAY QUANT NOS NONAB: CPT | Mod: 59

## 2021-01-20 PROCEDURE — 86334 IMMUNOFIX E-PHORESIS SERUM: CPT | Mod: 26,,, | Performed by: PATHOLOGY

## 2021-01-20 PROCEDURE — 85651 RBC SED RATE NONAUTOMATED: CPT

## 2021-01-20 PROCEDURE — 84446 ASSAY OF VITAMIN E: CPT

## 2021-01-20 PROCEDURE — 99214 PR OFFICE/OUTPT VISIT, EST, LEVL IV, 30-39 MIN: ICD-10-PCS | Mod: S$GLB,,, | Performed by: PSYCHIATRY & NEUROLOGY

## 2021-01-20 PROCEDURE — 3008F PR BODY MASS INDEX (BMI) DOCUMENTED: ICD-10-PCS | Mod: CPTII,S$GLB,, | Performed by: PSYCHIATRY & NEUROLOGY

## 2021-01-20 PROCEDURE — 86703 HIV-1/HIV-2 1 RESULT ANTBDY: CPT

## 2021-01-20 PROCEDURE — 82525 ASSAY OF COPPER: CPT

## 2021-01-20 PROCEDURE — 99214 OFFICE O/P EST MOD 30 MIN: CPT | Mod: S$GLB,,, | Performed by: PSYCHIATRY & NEUROLOGY

## 2021-01-20 PROCEDURE — 84207 ASSAY OF VITAMIN B-6: CPT

## 2021-01-20 PROCEDURE — 86592 SYPHILIS TEST NON-TREP QUAL: CPT

## 2021-01-20 PROCEDURE — 1101F PT FALLS ASSESS-DOCD LE1/YR: CPT | Mod: CPTII,S$GLB,, | Performed by: PSYCHIATRY & NEUROLOGY

## 2021-01-20 PROCEDURE — 3074F PR MOST RECENT SYSTOLIC BLOOD PRESSURE < 130 MM HG: ICD-10-PCS | Mod: CPTII,S$GLB,, | Performed by: PSYCHIATRY & NEUROLOGY

## 2021-01-20 PROCEDURE — 3078F PR MOST RECENT DIASTOLIC BLOOD PRESSURE < 80 MM HG: ICD-10-PCS | Mod: CPTII,S$GLB,, | Performed by: PSYCHIATRY & NEUROLOGY

## 2021-01-20 PROCEDURE — 83520 IMMUNOASSAY QUANT NOS NONAB: CPT

## 2021-01-20 PROCEDURE — 1125F AMNT PAIN NOTED PAIN PRSNT: CPT | Mod: S$GLB,,, | Performed by: PSYCHIATRY & NEUROLOGY

## 2021-01-20 PROCEDURE — 99999 PR PBB SHADOW E&M-EST. PATIENT-LVL V: ICD-10-PCS | Mod: PBBFAC,,, | Performed by: PSYCHIATRY & NEUROLOGY

## 2021-01-20 PROCEDURE — 99499 RISK ADDL DX/OHS AUDIT: ICD-10-PCS | Mod: S$GLB,,, | Performed by: PSYCHIATRY & NEUROLOGY

## 2021-01-20 PROCEDURE — 1157F ADVNC CARE PLAN IN RCRD: CPT | Mod: S$GLB,,, | Performed by: PSYCHIATRY & NEUROLOGY

## 2021-01-20 PROCEDURE — 99999 PR PBB SHADOW E&M-EST. PATIENT-LVL V: CPT | Mod: PBBFAC,,, | Performed by: PSYCHIATRY & NEUROLOGY

## 2021-01-20 PROCEDURE — 86334 PATHOLOGIST INTERPRETATION IFE: ICD-10-PCS | Mod: 26,,, | Performed by: PATHOLOGY

## 2021-01-20 PROCEDURE — 1125F PR PAIN SEVERITY QUANTIFIED, PAIN PRESENT: ICD-10-PCS | Mod: S$GLB,,, | Performed by: PSYCHIATRY & NEUROLOGY

## 2021-01-20 PROCEDURE — 1159F MED LIST DOCD IN RCRD: CPT | Mod: S$GLB,,, | Performed by: PSYCHIATRY & NEUROLOGY

## 2021-01-20 PROCEDURE — 86334 IMMUNOFIX E-PHORESIS SERUM: CPT

## 2021-01-20 PROCEDURE — 3288F PR FALLS RISK ASSESSMENT DOCUMENTED: ICD-10-PCS | Mod: CPTII,S$GLB,, | Performed by: PSYCHIATRY & NEUROLOGY

## 2021-01-20 PROCEDURE — 3078F DIAST BP <80 MM HG: CPT | Mod: CPTII,S$GLB,, | Performed by: PSYCHIATRY & NEUROLOGY

## 2021-01-20 PROCEDURE — 3288F FALL RISK ASSESSMENT DOCD: CPT | Mod: CPTII,S$GLB,, | Performed by: PSYCHIATRY & NEUROLOGY

## 2021-01-20 PROCEDURE — 3008F BODY MASS INDEX DOCD: CPT | Mod: CPTII,S$GLB,, | Performed by: PSYCHIATRY & NEUROLOGY

## 2021-01-21 ENCOUNTER — PATIENT MESSAGE (OUTPATIENT)
Dept: NEUROLOGY | Facility: CLINIC | Age: 71
End: 2021-01-21

## 2021-01-21 LAB
HIV 1+2 AB+HIV1 P24 AG SERPL QL IA: NEGATIVE
INTERPRETATION SERPL IFE-IMP: NORMAL
KAPPA LC SER QL IA: 2.43 MG/DL (ref 0.33–1.94)
KAPPA LC/LAMBDA SER IA: 1.48 (ref 0.26–1.65)
LAMBDA LC SER QL IA: 1.64 MG/DL (ref 0.57–2.63)
RPR SER QL: NORMAL

## 2021-01-22 LAB — PATHOLOGIST INTERPRETATION IFE: NORMAL

## 2021-01-23 LAB — COPPER SERPL-MCNC: 1143 UG/L (ref 810–1990)

## 2021-01-25 ENCOUNTER — PATIENT MESSAGE (OUTPATIENT)
Dept: NEUROLOGY | Facility: CLINIC | Age: 71
End: 2021-01-25

## 2021-01-25 ENCOUNTER — HOSPITAL ENCOUNTER (OUTPATIENT)
Dept: RADIOLOGY | Facility: OTHER | Age: 71
Discharge: HOME | End: 2021-01-25
Attending: PSYCHIATRY & NEUROLOGY
Payer: MEDICARE

## 2021-01-25 DIAGNOSIS — M48.02 SPINAL STENOSIS, CERVICAL REGION: ICD-10-CM

## 2021-01-25 LAB
PYRIDOXAL SERPL-MCNC: 76 UG/L (ref 5–50)
VIT B1 BLD-MCNC: 68 UG/L (ref 38–122)

## 2021-01-25 PROCEDURE — 72125 CT NECK SPINE W/O DYE: CPT | Mod: 26,,, | Performed by: RADIOLOGY

## 2021-01-25 PROCEDURE — 72125 CT CERVICAL SPINE WITHOUT CONTRAST: ICD-10-PCS | Mod: 26,,, | Performed by: RADIOLOGY

## 2021-01-25 PROCEDURE — 72125 CT NECK SPINE W/O DYE: CPT | Mod: TC

## 2021-01-26 DIAGNOSIS — M48.02 CERVICAL STENOSIS OF SPINE: Primary | ICD-10-CM

## 2021-01-26 LAB
A-TOCOPHEROL VIT E SERPL-MCNC: 1764 UG/DL (ref 500–1800)
ANNOTATION COMMENT IMP: ABNORMAL
PHYTANATE SERPL-SCNC: 2.76 NMOL/ML
PRISTANATE SERPL-SCNC: 0.21 NMOL/ML
PRISTANATE/PHYTANATE SERPL-SRTO: 0.08 RATIO
VLCFA C22:0 SERPL-SCNC: 100.1 NMOL/ML
VLCFA C24:0 SERPL-SCNC: 82.1 NMOL/ML
VLCFA C24:0/C22:0 SERPL-SRTO: 0.82 RATIO
VLCFA C26:0 SERPL-SCNC: 0.92 NMOL/ML
VLCFA C26:0/C22:0 SERPL-SRTO: 0.01 RATIO

## 2021-02-01 LAB — SENSORY NEUROPATHY PROFILE: NORMAL

## 2021-02-04 ENCOUNTER — IMMUNIZATION (OUTPATIENT)
Dept: PHARMACY | Facility: CLINIC | Age: 71
End: 2021-02-04

## 2021-02-04 ENCOUNTER — PATIENT MESSAGE (OUTPATIENT)
Dept: NEUROLOGY | Facility: CLINIC | Age: 71
End: 2021-02-04

## 2021-02-04 DIAGNOSIS — Z23 NEED FOR VACCINATION: Primary | ICD-10-CM

## 2021-02-09 ENCOUNTER — INFUSION (OUTPATIENT)
Dept: INFECTIOUS DISEASES | Facility: HOSPITAL | Age: 71
End: 2021-02-09
Attending: INTERNAL MEDICINE
Payer: MEDICARE

## 2021-02-09 VITALS
WEIGHT: 157.88 LBS | SYSTOLIC BLOOD PRESSURE: 120 MMHG | DIASTOLIC BLOOD PRESSURE: 62 MMHG | HEART RATE: 61 BPM | HEIGHT: 68 IN | BODY MASS INDEX: 23.93 KG/M2

## 2021-02-09 DIAGNOSIS — M81.8 OTHER OSTEOPOROSIS WITHOUT CURRENT PATHOLOGICAL FRACTURE: Primary | ICD-10-CM

## 2021-02-09 PROCEDURE — 63600175 PHARM REV CODE 636 W HCPCS: Mod: JG | Performed by: INTERNAL MEDICINE

## 2021-02-09 PROCEDURE — 96372 THER/PROPH/DIAG INJ SC/IM: CPT

## 2021-02-09 RX ADMIN — DENOSUMAB 60 MG: 60 INJECTION SUBCUTANEOUS at 10:02

## 2021-02-23 ENCOUNTER — TELEPHONE (OUTPATIENT)
Dept: INTERNAL MEDICINE | Facility: CLINIC | Age: 71
End: 2021-02-23

## 2021-02-25 ENCOUNTER — HOSPITAL ENCOUNTER (OUTPATIENT)
Dept: RADIOLOGY | Facility: HOSPITAL | Age: 71
Discharge: HOME | End: 2021-02-25
Attending: PSYCHIATRY & NEUROLOGY
Payer: MEDICARE

## 2021-02-25 DIAGNOSIS — M48.02 CERVICAL STENOSIS OF SPINE: ICD-10-CM

## 2021-02-25 PROCEDURE — 72052 XR CERVICAL SPINE 5 VIEW WITH FLEX AND EXT: ICD-10-PCS | Mod: 26,,, | Performed by: RADIOLOGY

## 2021-02-25 PROCEDURE — 72052 X-RAY EXAM NECK SPINE 6/>VWS: CPT | Mod: TC

## 2021-02-25 PROCEDURE — 72052 X-RAY EXAM NECK SPINE 6/>VWS: CPT | Mod: 26,,, | Performed by: RADIOLOGY

## 2021-02-26 ENCOUNTER — PATIENT MESSAGE (OUTPATIENT)
Dept: NEUROSURGERY | Facility: CLINIC | Age: 71
End: 2021-02-26

## 2021-03-04 ENCOUNTER — OFFICE VISIT (OUTPATIENT)
Dept: SPINE | Facility: CLINIC | Age: 71
End: 2021-03-04
Payer: MEDICARE

## 2021-03-04 VITALS
BODY MASS INDEX: 23.78 KG/M2 | TEMPERATURE: 97 F | DIASTOLIC BLOOD PRESSURE: 76 MMHG | HEART RATE: 77 BPM | HEIGHT: 68 IN | SYSTOLIC BLOOD PRESSURE: 121 MMHG | WEIGHT: 156.88 LBS

## 2021-03-04 DIAGNOSIS — M48.02 CERVICAL STENOSIS OF SPINE: ICD-10-CM

## 2021-03-04 DIAGNOSIS — M54.16 LUMBAR RADICULOPATHY: Primary | ICD-10-CM

## 2021-03-04 DIAGNOSIS — M54.6 PAIN IN THORACIC SPINE: ICD-10-CM

## 2021-03-04 PROCEDURE — 99205 OFFICE O/P NEW HI 60 MIN: CPT | Mod: S$GLB,,, | Performed by: NEUROLOGICAL SURGERY

## 2021-03-04 PROCEDURE — 3288F PR FALLS RISK ASSESSMENT DOCUMENTED: ICD-10-PCS | Mod: CPTII,S$GLB,, | Performed by: NEUROLOGICAL SURGERY

## 2021-03-04 PROCEDURE — 1100F PR PT FALLS ASSESS DOC 2+ FALLS/FALL W/INJURY/YR: ICD-10-PCS | Mod: CPTII,S$GLB,, | Performed by: NEUROLOGICAL SURGERY

## 2021-03-04 PROCEDURE — 99499 RISK ADDL DX/OHS AUDIT: ICD-10-PCS | Mod: S$GLB,,, | Performed by: NEUROLOGICAL SURGERY

## 2021-03-04 PROCEDURE — 1125F PR PAIN SEVERITY QUANTIFIED, PAIN PRESENT: ICD-10-PCS | Mod: S$GLB,,, | Performed by: NEUROLOGICAL SURGERY

## 2021-03-04 PROCEDURE — 1125F AMNT PAIN NOTED PAIN PRSNT: CPT | Mod: S$GLB,,, | Performed by: NEUROLOGICAL SURGERY

## 2021-03-04 PROCEDURE — 99205 PR OFFICE/OUTPT VISIT, NEW, LEVL V, 60-74 MIN: ICD-10-PCS | Mod: S$GLB,,, | Performed by: NEUROLOGICAL SURGERY

## 2021-03-04 PROCEDURE — 3074F SYST BP LT 130 MM HG: CPT | Mod: CPTII,S$GLB,, | Performed by: NEUROLOGICAL SURGERY

## 2021-03-04 PROCEDURE — 99999 PR PBB SHADOW E&M-EST. PATIENT-LVL V: ICD-10-PCS | Mod: PBBFAC,,, | Performed by: NEUROLOGICAL SURGERY

## 2021-03-04 PROCEDURE — 99999 PR PBB SHADOW E&M-EST. PATIENT-LVL V: CPT | Mod: PBBFAC,,, | Performed by: NEUROLOGICAL SURGERY

## 2021-03-04 PROCEDURE — 3078F PR MOST RECENT DIASTOLIC BLOOD PRESSURE < 80 MM HG: ICD-10-PCS | Mod: CPTII,S$GLB,, | Performed by: NEUROLOGICAL SURGERY

## 2021-03-04 PROCEDURE — 1159F PR MEDICATION LIST DOCUMENTED IN MEDICAL RECORD: ICD-10-PCS | Mod: S$GLB,,, | Performed by: NEUROLOGICAL SURGERY

## 2021-03-04 PROCEDURE — 1157F ADVNC CARE PLAN IN RCRD: CPT | Mod: S$GLB,,, | Performed by: NEUROLOGICAL SURGERY

## 2021-03-04 PROCEDURE — 3288F FALL RISK ASSESSMENT DOCD: CPT | Mod: CPTII,S$GLB,, | Performed by: NEUROLOGICAL SURGERY

## 2021-03-04 PROCEDURE — 3008F BODY MASS INDEX DOCD: CPT | Mod: CPTII,S$GLB,, | Performed by: NEUROLOGICAL SURGERY

## 2021-03-04 PROCEDURE — 1157F PR ADVANCE CARE PLAN OR EQUIV PRESENT IN MEDICAL RECORD: ICD-10-PCS | Mod: S$GLB,,, | Performed by: NEUROLOGICAL SURGERY

## 2021-03-04 PROCEDURE — 3074F PR MOST RECENT SYSTOLIC BLOOD PRESSURE < 130 MM HG: ICD-10-PCS | Mod: CPTII,S$GLB,, | Performed by: NEUROLOGICAL SURGERY

## 2021-03-04 PROCEDURE — 99499 UNLISTED E&M SERVICE: CPT | Mod: S$GLB,,, | Performed by: NEUROLOGICAL SURGERY

## 2021-03-04 PROCEDURE — 1159F MED LIST DOCD IN RCRD: CPT | Mod: S$GLB,,, | Performed by: NEUROLOGICAL SURGERY

## 2021-03-04 PROCEDURE — 1100F PTFALLS ASSESS-DOCD GE2>/YR: CPT | Mod: CPTII,S$GLB,, | Performed by: NEUROLOGICAL SURGERY

## 2021-03-04 PROCEDURE — 3008F PR BODY MASS INDEX (BMI) DOCUMENTED: ICD-10-PCS | Mod: CPTII,S$GLB,, | Performed by: NEUROLOGICAL SURGERY

## 2021-03-04 PROCEDURE — 3078F DIAST BP <80 MM HG: CPT | Mod: CPTII,S$GLB,, | Performed by: NEUROLOGICAL SURGERY

## 2021-03-10 ENCOUNTER — TELEPHONE (OUTPATIENT)
Dept: NEUROLOGY | Facility: CLINIC | Age: 71
End: 2021-03-10

## 2021-03-11 ENCOUNTER — TELEPHONE (OUTPATIENT)
Dept: NEUROLOGY | Facility: CLINIC | Age: 71
End: 2021-03-11

## 2021-03-15 ENCOUNTER — PATIENT MESSAGE (OUTPATIENT)
Dept: SPINE | Facility: CLINIC | Age: 71
End: 2021-03-15

## 2021-03-18 ENCOUNTER — PATIENT MESSAGE (OUTPATIENT)
Dept: RESEARCH | Facility: HOSPITAL | Age: 71
End: 2021-03-18

## 2021-03-23 ENCOUNTER — TELEPHONE (OUTPATIENT)
Dept: NEUROSURGERY | Facility: CLINIC | Age: 71
End: 2021-03-23

## 2021-03-23 ENCOUNTER — PATIENT MESSAGE (OUTPATIENT)
Dept: SPINE | Facility: CLINIC | Age: 71
End: 2021-03-23

## 2021-03-23 ENCOUNTER — OFFICE VISIT (OUTPATIENT)
Dept: PSYCHIATRY | Facility: CLINIC | Age: 71
End: 2021-03-23
Payer: MEDICARE

## 2021-03-23 DIAGNOSIS — Z95.0 CARDIAC PACEMAKER IN SITU: Primary | ICD-10-CM

## 2021-03-23 DIAGNOSIS — F33.41 MAJOR DEPRESSIVE DISORDER, RECURRENT EPISODE, IN PARTIAL REMISSION: Primary | ICD-10-CM

## 2021-03-23 DIAGNOSIS — F98.8 ATTENTION DEFICIT DISORDER (ADD) IN ADULT: ICD-10-CM

## 2021-03-23 PROCEDURE — 1157F ADVNC CARE PLAN IN RCRD: CPT | Mod: 95,,, | Performed by: SOCIAL WORKER

## 2021-03-23 PROCEDURE — 90834 PSYTX W PT 45 MINUTES: CPT | Mod: 95,,, | Performed by: SOCIAL WORKER

## 2021-03-23 PROCEDURE — 90834 PR PSYCHOTHERAPY W/PATIENT, 45 MIN: ICD-10-PCS | Mod: 95,,, | Performed by: SOCIAL WORKER

## 2021-03-23 PROCEDURE — 1157F PR ADVANCE CARE PLAN OR EQUIV PRESENT IN MEDICAL RECORD: ICD-10-PCS | Mod: 95,,, | Performed by: SOCIAL WORKER

## 2021-03-26 ENCOUNTER — PATIENT MESSAGE (OUTPATIENT)
Dept: RESEARCH | Facility: HOSPITAL | Age: 71
End: 2021-03-26

## 2021-04-06 ENCOUNTER — PATIENT MESSAGE (OUTPATIENT)
Dept: SPINE | Facility: CLINIC | Age: 71
End: 2021-04-06

## 2021-04-12 ENCOUNTER — CLINICAL SUPPORT (OUTPATIENT)
Dept: CARDIOLOGY | Facility: HOSPITAL | Age: 71
End: 2021-04-12
Attending: INTERNAL MEDICINE
Payer: MEDICARE

## 2021-04-12 DIAGNOSIS — I49.5 SSS (SICK SINUS SYNDROME): ICD-10-CM

## 2021-04-12 PROCEDURE — 93280 PM DEVICE PROGR EVAL DUAL: CPT

## 2021-04-12 PROCEDURE — 93280 PM DEVICE PROGR EVAL DUAL: CPT | Mod: 26,,, | Performed by: INTERNAL MEDICINE

## 2021-04-12 PROCEDURE — 93280 CARDIAC DEVICE CHECK - IN CLINIC & HOSPITAL: ICD-10-PCS | Mod: 26,,, | Performed by: INTERNAL MEDICINE

## 2021-04-13 ENCOUNTER — PATIENT MESSAGE (OUTPATIENT)
Dept: CARDIOLOGY | Facility: CLINIC | Age: 71
End: 2021-04-13

## 2021-04-13 ENCOUNTER — PATIENT MESSAGE (OUTPATIENT)
Dept: PAIN MEDICINE | Facility: CLINIC | Age: 71
End: 2021-04-13

## 2021-04-16 ENCOUNTER — OFFICE VISIT (OUTPATIENT)
Dept: PODIATRY | Facility: CLINIC | Age: 71
End: 2021-04-16
Payer: MEDICARE

## 2021-04-16 VITALS
HEIGHT: 68 IN | DIASTOLIC BLOOD PRESSURE: 70 MMHG | BODY MASS INDEX: 23.85 KG/M2 | HEART RATE: 58 BPM | SYSTOLIC BLOOD PRESSURE: 122 MMHG

## 2021-04-16 DIAGNOSIS — M20.42 HAMMER TOES OF BOTH FEET: ICD-10-CM

## 2021-04-16 DIAGNOSIS — G60.8 SENSORY PERIPHERAL NEUROPATHY: Primary | ICD-10-CM

## 2021-04-16 DIAGNOSIS — L60.0 INGROWN NAIL: ICD-10-CM

## 2021-04-16 DIAGNOSIS — M20.41 HAMMER TOES OF BOTH FEET: ICD-10-CM

## 2021-04-16 PROCEDURE — 1159F PR MEDICATION LIST DOCUMENTED IN MEDICAL RECORD: ICD-10-PCS | Mod: S$GLB,,, | Performed by: PODIATRIST

## 2021-04-16 PROCEDURE — 99213 PR OFFICE/OUTPT VISIT, EST, LEVL III, 20-29 MIN: ICD-10-PCS | Mod: S$GLB,,, | Performed by: PODIATRIST

## 2021-04-16 PROCEDURE — 99213 OFFICE O/P EST LOW 20 MIN: CPT | Mod: S$GLB,,, | Performed by: PODIATRIST

## 2021-04-16 PROCEDURE — 3008F PR BODY MASS INDEX (BMI) DOCUMENTED: ICD-10-PCS | Mod: CPTII,S$GLB,, | Performed by: PODIATRIST

## 2021-04-16 PROCEDURE — 1125F AMNT PAIN NOTED PAIN PRSNT: CPT | Mod: S$GLB,,, | Performed by: PODIATRIST

## 2021-04-16 PROCEDURE — 1125F PR PAIN SEVERITY QUANTIFIED, PAIN PRESENT: ICD-10-PCS | Mod: S$GLB,,, | Performed by: PODIATRIST

## 2021-04-16 PROCEDURE — 1157F PR ADVANCE CARE PLAN OR EQUIV PRESENT IN MEDICAL RECORD: ICD-10-PCS | Mod: S$GLB,,, | Performed by: PODIATRIST

## 2021-04-16 PROCEDURE — 99999 PR PBB SHADOW E&M-EST. PATIENT-LVL III: ICD-10-PCS | Mod: PBBFAC,,, | Performed by: PODIATRIST

## 2021-04-16 PROCEDURE — 99999 PR PBB SHADOW E&M-EST. PATIENT-LVL III: CPT | Mod: PBBFAC,,, | Performed by: PODIATRIST

## 2021-04-16 PROCEDURE — 1157F ADVNC CARE PLAN IN RCRD: CPT | Mod: S$GLB,,, | Performed by: PODIATRIST

## 2021-04-16 PROCEDURE — 3008F BODY MASS INDEX DOCD: CPT | Mod: CPTII,S$GLB,, | Performed by: PODIATRIST

## 2021-04-16 PROCEDURE — 1159F MED LIST DOCD IN RCRD: CPT | Mod: S$GLB,,, | Performed by: PODIATRIST

## 2021-04-22 DIAGNOSIS — Z95.0 CARDIAC PACEMAKER IN SITU: Primary | ICD-10-CM

## 2021-04-30 ENCOUNTER — PATIENT MESSAGE (OUTPATIENT)
Dept: NEUROSURGERY | Facility: CLINIC | Age: 71
End: 2021-04-30

## 2021-04-30 ENCOUNTER — TELEPHONE (OUTPATIENT)
Dept: NEUROSURGERY | Facility: CLINIC | Age: 71
End: 2021-04-30

## 2021-05-04 ENCOUNTER — HOSPITAL ENCOUNTER (OUTPATIENT)
Facility: HOSPITAL | Age: 71
Discharge: HOME | End: 2021-05-04
Attending: NEUROLOGICAL SURGERY | Admitting: NEUROLOGICAL SURGERY
Payer: MEDICARE

## 2021-05-04 ENCOUNTER — HOSPITAL ENCOUNTER (OUTPATIENT)
Dept: RADIOLOGY | Facility: HOSPITAL | Age: 71
Discharge: HOME | End: 2021-05-04
Attending: NEUROLOGICAL SURGERY | Admitting: NEUROLOGICAL SURGERY
Payer: MEDICARE

## 2021-05-04 ENCOUNTER — HOSPITAL ENCOUNTER (OUTPATIENT)
Dept: RADIOLOGY | Facility: HOSPITAL | Age: 71
Discharge: HOME | End: 2021-05-04
Attending: NEUROLOGICAL SURGERY
Payer: MEDICARE

## 2021-05-04 VITALS
TEMPERATURE: 98 F | DIASTOLIC BLOOD PRESSURE: 63 MMHG | RESPIRATION RATE: 15 BRPM | SYSTOLIC BLOOD PRESSURE: 135 MMHG | OXYGEN SATURATION: 99 % | HEART RATE: 63 BPM

## 2021-05-04 DIAGNOSIS — M54.9 BACK PAIN: ICD-10-CM

## 2021-05-04 DIAGNOSIS — Z95.0 CARDIAC PACEMAKER IN SITU: ICD-10-CM

## 2021-05-04 PROCEDURE — 72131 CT ENTIRE SPINE WITHOUT CONTRAST (XPD): ICD-10-PCS | Mod: 26,,, | Performed by: RADIOLOGY

## 2021-05-04 PROCEDURE — 25000003 PHARM REV CODE 250: Performed by: NEUROLOGICAL SURGERY

## 2021-05-04 PROCEDURE — 72131 CT LUMBAR SPINE W/O DYE: CPT | Mod: 26,,, | Performed by: RADIOLOGY

## 2021-05-04 PROCEDURE — 72125 CT NECK SPINE W/O DYE: CPT | Mod: 26,,, | Performed by: RADIOLOGY

## 2021-05-04 PROCEDURE — 72128 CT ENTIRE SPINE WITHOUT CONTRAST (XPD): ICD-10-PCS | Mod: 26,,, | Performed by: RADIOLOGY

## 2021-05-04 PROCEDURE — 62305 FL MYELOGRAM 2 OR MORE REGIONS: ICD-10-PCS | Mod: ,,, | Performed by: RADIOLOGY

## 2021-05-04 PROCEDURE — 62305 MYELOGRAPHY LUMBAR INJECTION: CPT

## 2021-05-04 PROCEDURE — 62305 MYELOGRAPHY LUMBAR INJECTION: CPT | Mod: ,,, | Performed by: RADIOLOGY

## 2021-05-04 PROCEDURE — 72128 CT CHEST SPINE W/O DYE: CPT | Mod: TC

## 2021-05-04 PROCEDURE — 72125 CT ENTIRE SPINE WITHOUT CONTRAST (XPD): ICD-10-PCS | Mod: 26,,, | Performed by: RADIOLOGY

## 2021-05-04 PROCEDURE — 25500020 PHARM REV CODE 255: Performed by: NEUROLOGICAL SURGERY

## 2021-05-04 PROCEDURE — 72128 CT CHEST SPINE W/O DYE: CPT | Mod: 26,,, | Performed by: RADIOLOGY

## 2021-05-04 RX ORDER — LIDOCAINE HYDROCHLORIDE 10 MG/ML
10 INJECTION INFILTRATION; PERINEURAL ONCE
Status: COMPLETED | OUTPATIENT
Start: 2021-05-04 | End: 2021-05-04

## 2021-05-04 RX ADMIN — IOHEXOL 10 ML: 300 INJECTION, SOLUTION INTRAVENOUS at 11:05

## 2021-05-04 RX ADMIN — LIDOCAINE HYDROCHLORIDE 10 ML: 10 INJECTION, SOLUTION INFILTRATION; PERINEURAL at 11:05

## 2021-05-05 ENCOUNTER — TELEPHONE (OUTPATIENT)
Dept: NEUROLOGY | Facility: CLINIC | Age: 71
End: 2021-05-05

## 2021-05-06 ENCOUNTER — OFFICE VISIT (OUTPATIENT)
Dept: NEUROSURGERY | Facility: CLINIC | Age: 71
End: 2021-05-06
Payer: MEDICARE

## 2021-05-06 VITALS
BODY MASS INDEX: 23.95 KG/M2 | WEIGHT: 158.06 LBS | HEART RATE: 83 BPM | TEMPERATURE: 98 F | HEIGHT: 68 IN | SYSTOLIC BLOOD PRESSURE: 116 MMHG | DIASTOLIC BLOOD PRESSURE: 76 MMHG

## 2021-05-06 DIAGNOSIS — G95.9 CERVICAL MYELOPATHY: ICD-10-CM

## 2021-05-06 PROCEDURE — 3008F BODY MASS INDEX DOCD: CPT | Mod: CPTII,S$GLB,, | Performed by: NEUROLOGICAL SURGERY

## 2021-05-06 PROCEDURE — 3288F PR FALLS RISK ASSESSMENT DOCUMENTED: ICD-10-PCS | Mod: CPTII,S$GLB,, | Performed by: NEUROLOGICAL SURGERY

## 2021-05-06 PROCEDURE — 3288F FALL RISK ASSESSMENT DOCD: CPT | Mod: CPTII,S$GLB,, | Performed by: NEUROLOGICAL SURGERY

## 2021-05-06 PROCEDURE — 1126F PR PAIN SEVERITY QUANTIFIED, NO PAIN PRESENT: ICD-10-PCS | Mod: S$GLB,,, | Performed by: NEUROLOGICAL SURGERY

## 2021-05-06 PROCEDURE — 99214 PR OFFICE/OUTPT VISIT, EST, LEVL IV, 30-39 MIN: ICD-10-PCS | Mod: S$GLB,,, | Performed by: NEUROLOGICAL SURGERY

## 2021-05-06 PROCEDURE — 3008F PR BODY MASS INDEX (BMI) DOCUMENTED: ICD-10-PCS | Mod: CPTII,S$GLB,, | Performed by: NEUROLOGICAL SURGERY

## 2021-05-06 PROCEDURE — 99214 OFFICE O/P EST MOD 30 MIN: CPT | Mod: S$GLB,,, | Performed by: NEUROLOGICAL SURGERY

## 2021-05-06 PROCEDURE — 1157F ADVNC CARE PLAN IN RCRD: CPT | Mod: S$GLB,,, | Performed by: NEUROLOGICAL SURGERY

## 2021-05-06 PROCEDURE — 99999 PR PBB SHADOW E&M-EST. PATIENT-LVL III: ICD-10-PCS | Mod: PBBFAC,,, | Performed by: NEUROLOGICAL SURGERY

## 2021-05-06 PROCEDURE — 1101F PT FALLS ASSESS-DOCD LE1/YR: CPT | Mod: CPTII,S$GLB,, | Performed by: NEUROLOGICAL SURGERY

## 2021-05-06 PROCEDURE — 1126F AMNT PAIN NOTED NONE PRSNT: CPT | Mod: S$GLB,,, | Performed by: NEUROLOGICAL SURGERY

## 2021-05-06 PROCEDURE — 1159F PR MEDICATION LIST DOCUMENTED IN MEDICAL RECORD: ICD-10-PCS | Mod: S$GLB,,, | Performed by: NEUROLOGICAL SURGERY

## 2021-05-06 PROCEDURE — 1101F PR PT FALLS ASSESS DOC 0-1 FALLS W/OUT INJ PAST YR: ICD-10-PCS | Mod: CPTII,S$GLB,, | Performed by: NEUROLOGICAL SURGERY

## 2021-05-06 PROCEDURE — 1159F MED LIST DOCD IN RCRD: CPT | Mod: S$GLB,,, | Performed by: NEUROLOGICAL SURGERY

## 2021-05-06 PROCEDURE — 99999 PR PBB SHADOW E&M-EST. PATIENT-LVL III: CPT | Mod: PBBFAC,,, | Performed by: NEUROLOGICAL SURGERY

## 2021-05-06 PROCEDURE — 1157F PR ADVANCE CARE PLAN OR EQUIV PRESENT IN MEDICAL RECORD: ICD-10-PCS | Mod: S$GLB,,, | Performed by: NEUROLOGICAL SURGERY

## 2021-05-13 ENCOUNTER — OFFICE VISIT (OUTPATIENT)
Dept: PSYCHIATRY | Facility: CLINIC | Age: 71
End: 2021-05-13
Payer: MEDICARE

## 2021-05-13 DIAGNOSIS — F98.8 ATTENTION DEFICIT DISORDER (ADD) IN ADULT: ICD-10-CM

## 2021-05-13 DIAGNOSIS — F33.41 MAJOR DEPRESSIVE DISORDER, RECURRENT EPISODE, IN PARTIAL REMISSION: Primary | ICD-10-CM

## 2021-05-13 PROCEDURE — 1157F ADVNC CARE PLAN IN RCRD: CPT | Mod: 95,,, | Performed by: SOCIAL WORKER

## 2021-05-13 PROCEDURE — 90834 PSYTX W PT 45 MINUTES: CPT | Mod: 95,,, | Performed by: SOCIAL WORKER

## 2021-05-13 PROCEDURE — 1157F PR ADVANCE CARE PLAN OR EQUIV PRESENT IN MEDICAL RECORD: ICD-10-PCS | Mod: 95,,, | Performed by: SOCIAL WORKER

## 2021-05-13 PROCEDURE — 99499 UNLISTED E&M SERVICE: CPT | Mod: HCNC,95,, | Performed by: SOCIAL WORKER

## 2021-05-13 PROCEDURE — 99499 RISK ADDL DX/OHS AUDIT: ICD-10-PCS | Mod: HCNC,95,, | Performed by: SOCIAL WORKER

## 2021-05-13 PROCEDURE — 90834 PR PSYCHOTHERAPY W/PATIENT, 45 MIN: ICD-10-PCS | Mod: 95,,, | Performed by: SOCIAL WORKER

## 2021-05-17 ENCOUNTER — PES CALL (OUTPATIENT)
Dept: ADMINISTRATIVE | Facility: CLINIC | Age: 71
End: 2021-05-17

## 2021-05-18 ENCOUNTER — TELEPHONE (OUTPATIENT)
Dept: CARDIOLOGY | Facility: CLINIC | Age: 71
End: 2021-05-18

## 2021-05-18 DIAGNOSIS — I49.8 OTHER SPECIFIED CARDIAC ARRHYTHMIAS: ICD-10-CM

## 2021-05-18 DIAGNOSIS — I10 ESSENTIAL HYPERTENSION: Primary | ICD-10-CM

## 2021-05-18 DIAGNOSIS — E78.2 MIXED HYPERLIPIDEMIA: ICD-10-CM

## 2021-05-19 ENCOUNTER — PATIENT MESSAGE (OUTPATIENT)
Dept: INTERNAL MEDICINE | Facility: CLINIC | Age: 71
End: 2021-05-19

## 2021-05-19 ENCOUNTER — PATIENT MESSAGE (OUTPATIENT)
Dept: NEUROLOGY | Facility: CLINIC | Age: 71
End: 2021-05-19

## 2021-05-20 ENCOUNTER — OFFICE VISIT (OUTPATIENT)
Dept: NEUROLOGY | Facility: CLINIC | Age: 71
End: 2021-05-20
Payer: MEDICARE

## 2021-05-20 ENCOUNTER — PATIENT MESSAGE (OUTPATIENT)
Dept: NEUROLOGY | Facility: CLINIC | Age: 71
End: 2021-05-20

## 2021-05-20 DIAGNOSIS — G60.3 IDIOPATHIC PROGRESSIVE POLYNEUROPATHY: ICD-10-CM

## 2021-05-20 DIAGNOSIS — G56.00 CARPAL TUNNEL SYNDROME, UNSPECIFIED LATERALITY: Primary | ICD-10-CM

## 2021-05-20 PROCEDURE — 1159F PR MEDICATION LIST DOCUMENTED IN MEDICAL RECORD: ICD-10-PCS | Mod: 95,,, | Performed by: PSYCHIATRY & NEUROLOGY

## 2021-05-20 PROCEDURE — 99215 OFFICE O/P EST HI 40 MIN: CPT | Mod: 95,,, | Performed by: PSYCHIATRY & NEUROLOGY

## 2021-05-20 PROCEDURE — 1159F MED LIST DOCD IN RCRD: CPT | Mod: 95,,, | Performed by: PSYCHIATRY & NEUROLOGY

## 2021-05-20 PROCEDURE — 1157F ADVNC CARE PLAN IN RCRD: CPT | Mod: 95,,, | Performed by: PSYCHIATRY & NEUROLOGY

## 2021-05-20 PROCEDURE — 99499 RISK ADDL DX/OHS AUDIT: ICD-10-PCS | Mod: 95,,, | Performed by: PSYCHIATRY & NEUROLOGY

## 2021-05-20 PROCEDURE — 99499 UNLISTED E&M SERVICE: CPT | Mod: 95,,, | Performed by: PSYCHIATRY & NEUROLOGY

## 2021-05-20 PROCEDURE — 1157F PR ADVANCE CARE PLAN OR EQUIV PRESENT IN MEDICAL RECORD: ICD-10-PCS | Mod: 95,,, | Performed by: PSYCHIATRY & NEUROLOGY

## 2021-05-20 PROCEDURE — 99215 PR OFFICE/OUTPT VISIT, EST, LEVL V, 40-54 MIN: ICD-10-PCS | Mod: 95,,, | Performed by: PSYCHIATRY & NEUROLOGY

## 2021-05-24 ENCOUNTER — PATIENT MESSAGE (OUTPATIENT)
Dept: NEUROSURGERY | Facility: CLINIC | Age: 71
End: 2021-05-24

## 2021-05-25 ENCOUNTER — TELEPHONE (OUTPATIENT)
Dept: NEUROSURGERY | Facility: CLINIC | Age: 71
End: 2021-05-25

## 2021-05-25 ENCOUNTER — OFFICE VISIT (OUTPATIENT)
Dept: NEUROSURGERY | Facility: CLINIC | Age: 71
End: 2021-05-25
Payer: MEDICARE

## 2021-05-25 VITALS
BODY MASS INDEX: 24 KG/M2 | HEART RATE: 74 BPM | SYSTOLIC BLOOD PRESSURE: 121 MMHG | WEIGHT: 158.38 LBS | DIASTOLIC BLOOD PRESSURE: 78 MMHG | HEIGHT: 68 IN | TEMPERATURE: 98 F

## 2021-05-25 DIAGNOSIS — M47.12 CERVICAL SPONDYLOSIS WITH MYELOPATHY: Primary | ICD-10-CM

## 2021-05-25 DIAGNOSIS — G95.9 CERVICAL MYELOPATHY: Primary | ICD-10-CM

## 2021-05-25 PROCEDURE — 3008F BODY MASS INDEX DOCD: CPT | Mod: CPTII,S$GLB,, | Performed by: NEUROLOGICAL SURGERY

## 2021-05-25 PROCEDURE — 1101F PR PT FALLS ASSESS DOC 0-1 FALLS W/OUT INJ PAST YR: ICD-10-PCS | Mod: CPTII,S$GLB,, | Performed by: NEUROLOGICAL SURGERY

## 2021-05-25 PROCEDURE — 99215 PR OFFICE/OUTPT VISIT, EST, LEVL V, 40-54 MIN: ICD-10-PCS | Mod: 57,S$GLB,, | Performed by: NEUROLOGICAL SURGERY

## 2021-05-25 PROCEDURE — 1125F AMNT PAIN NOTED PAIN PRSNT: CPT | Mod: S$GLB,,, | Performed by: NEUROLOGICAL SURGERY

## 2021-05-25 PROCEDURE — 1159F PR MEDICATION LIST DOCUMENTED IN MEDICAL RECORD: ICD-10-PCS | Mod: S$GLB,,, | Performed by: NEUROLOGICAL SURGERY

## 2021-05-25 PROCEDURE — 3288F PR FALLS RISK ASSESSMENT DOCUMENTED: ICD-10-PCS | Mod: CPTII,S$GLB,, | Performed by: NEUROLOGICAL SURGERY

## 2021-05-25 PROCEDURE — 3288F FALL RISK ASSESSMENT DOCD: CPT | Mod: CPTII,S$GLB,, | Performed by: NEUROLOGICAL SURGERY

## 2021-05-25 PROCEDURE — 99999 PR PBB SHADOW E&M-EST. PATIENT-LVL IV: CPT | Mod: PBBFAC,,, | Performed by: NEUROLOGICAL SURGERY

## 2021-05-25 PROCEDURE — 1101F PT FALLS ASSESS-DOCD LE1/YR: CPT | Mod: CPTII,S$GLB,, | Performed by: NEUROLOGICAL SURGERY

## 2021-05-25 PROCEDURE — 1159F MED LIST DOCD IN RCRD: CPT | Mod: S$GLB,,, | Performed by: NEUROLOGICAL SURGERY

## 2021-05-25 PROCEDURE — 1157F ADVNC CARE PLAN IN RCRD: CPT | Mod: S$GLB,,, | Performed by: NEUROLOGICAL SURGERY

## 2021-05-25 PROCEDURE — 99215 OFFICE O/P EST HI 40 MIN: CPT | Mod: 57,S$GLB,, | Performed by: NEUROLOGICAL SURGERY

## 2021-05-25 PROCEDURE — 99999 PR PBB SHADOW E&M-EST. PATIENT-LVL IV: ICD-10-PCS | Mod: PBBFAC,,, | Performed by: NEUROLOGICAL SURGERY

## 2021-05-25 PROCEDURE — 1125F PR PAIN SEVERITY QUANTIFIED, PAIN PRESENT: ICD-10-PCS | Mod: S$GLB,,, | Performed by: NEUROLOGICAL SURGERY

## 2021-05-25 PROCEDURE — 3008F PR BODY MASS INDEX (BMI) DOCUMENTED: ICD-10-PCS | Mod: CPTII,S$GLB,, | Performed by: NEUROLOGICAL SURGERY

## 2021-05-25 PROCEDURE — 1157F PR ADVANCE CARE PLAN OR EQUIV PRESENT IN MEDICAL RECORD: ICD-10-PCS | Mod: S$GLB,,, | Performed by: NEUROLOGICAL SURGERY

## 2021-05-25 NOTE — PROGRESS NOTES
Admit .6  Social work consulted.  6/18 Brief intervention completed.   The patient location is: [place]  The chief complaint leading to consultation is: depression  Visit type: Virtual visit with synchronous audio and video  Total time spent with patient: 45 minutes  Each patient to whom he or she provides medical services by telemedicine is:  (1) informed of the relationship between the physician and patient and the respective role of any other health care provider with respect to management of the patient; and (2) notified that he or she may decline to receive medical services by telemedicine and may withdraw from such care at any time.    Notes: Individual Psychotherapy (PhD/LCSW)    5/4/2020    Site: telemed    Therapeutic Intervention: Met with patient alone.  Outpatient - Insight oriented psychotherapy 45 min - CPT code 55437    Chief complaint/reason for encounter: depression, distractibility     Interval history and content of current session: Pt continues to stay in bed until afternoon many days, but has also done a lot of cleaning and arranging at home, working on a project for a friend, and participating in IRB. Mood is good. She feels she should exercise but is not doing so and agrees this may contribute to her feeling of fatigue.     Treatment plan:  · Target symptoms: depression, distractibility  · Why chosen therapy is appropriate versus another modality: relevant to diagnosis  · Outcome monitoring methods: self-report  · Therapeutic intervention type: insight oriented psychotherapy    Risk parameters:  Patient reports no suicidal ideation  Patient reports no homicidal ideation  Patient reports no self-injurious behavior  Patient reports no violent behavior    Verbal deficits: None    Patient's response to intervention:  The patient's response to intervention is motivated    Progress toward goals and other mental status changes:  The patient's progress toward goals is fair    Diagnosis: Major depression, recurrent, in partial remission,  ADD in adult      Plan:  individual psychotherapy    Return to clinic: f/u as scheduled

## 2021-05-28 ENCOUNTER — TELEPHONE (OUTPATIENT)
Dept: INTERNAL MEDICINE | Facility: CLINIC | Age: 71
End: 2021-05-28

## 2021-05-28 ENCOUNTER — OFFICE VISIT (OUTPATIENT)
Dept: PODIATRY | Facility: CLINIC | Age: 71
End: 2021-05-28
Payer: MEDICARE

## 2021-05-28 ENCOUNTER — TELEPHONE (OUTPATIENT)
Dept: PREADMISSION TESTING | Facility: HOSPITAL | Age: 71
End: 2021-05-28

## 2021-05-28 ENCOUNTER — PATIENT MESSAGE (OUTPATIENT)
Dept: NEUROSURGERY | Facility: CLINIC | Age: 71
End: 2021-05-28

## 2021-05-28 VITALS
BODY MASS INDEX: 24.08 KG/M2 | DIASTOLIC BLOOD PRESSURE: 83 MMHG | SYSTOLIC BLOOD PRESSURE: 142 MMHG | HEIGHT: 68 IN | HEART RATE: 71 BPM

## 2021-05-28 DIAGNOSIS — M20.41 HAMMER TOES OF BOTH FEET: ICD-10-CM

## 2021-05-28 DIAGNOSIS — G60.8 SENSORY PERIPHERAL NEUROPATHY: Primary | ICD-10-CM

## 2021-05-28 DIAGNOSIS — Z01.818 PREOPERATIVE TESTING: Primary | ICD-10-CM

## 2021-05-28 DIAGNOSIS — M79.605 PAIN IN BOTH LOWER EXTREMITIES: ICD-10-CM

## 2021-05-28 DIAGNOSIS — M20.42 HAMMER TOES OF BOTH FEET: ICD-10-CM

## 2021-05-28 DIAGNOSIS — M79.604 PAIN IN BOTH LOWER EXTREMITIES: ICD-10-CM

## 2021-05-28 DIAGNOSIS — L60.0 INGROWN NAIL: ICD-10-CM

## 2021-05-28 DIAGNOSIS — Z01.810 PRE-OPERATIVE CARDIOVASCULAR EXAMINATION: Primary | ICD-10-CM

## 2021-05-28 PROCEDURE — 99212 PR OFFICE/OUTPT VISIT, EST, LEVL II, 10-19 MIN: ICD-10-PCS | Mod: HCNC,S$GLB,, | Performed by: PODIATRIST

## 2021-05-28 PROCEDURE — 3079F PR MOST RECENT DIASTOLIC BLOOD PRESSURE 80-89 MM HG: ICD-10-PCS | Mod: HCNC,CPTII,S$GLB, | Performed by: PODIATRIST

## 2021-05-28 PROCEDURE — 1157F PR ADVANCE CARE PLAN OR EQUIV PRESENT IN MEDICAL RECORD: ICD-10-PCS | Mod: HCNC,CPTII,S$GLB, | Performed by: PODIATRIST

## 2021-05-28 PROCEDURE — 99999 PR PBB SHADOW E&M-EST. PATIENT-LVL III: CPT | Mod: PBBFAC,HCNC,, | Performed by: PODIATRIST

## 2021-05-28 PROCEDURE — 3008F BODY MASS INDEX DOCD: CPT | Mod: HCNC,CPTII,S$GLB, | Performed by: PODIATRIST

## 2021-05-28 PROCEDURE — 3079F DIAST BP 80-89 MM HG: CPT | Mod: HCNC,CPTII,S$GLB, | Performed by: PODIATRIST

## 2021-05-28 PROCEDURE — 1126F PR PAIN SEVERITY QUANTIFIED, NO PAIN PRESENT: ICD-10-PCS | Mod: HCNC,CPTII,S$GLB, | Performed by: PODIATRIST

## 2021-05-28 PROCEDURE — 3077F PR MOST RECENT SYSTOLIC BLOOD PRESSURE >= 140 MM HG: ICD-10-PCS | Mod: HCNC,CPTII,S$GLB, | Performed by: PODIATRIST

## 2021-05-28 PROCEDURE — 99999 PR PBB SHADOW E&M-EST. PATIENT-LVL III: ICD-10-PCS | Mod: PBBFAC,HCNC,, | Performed by: PODIATRIST

## 2021-05-28 PROCEDURE — 1159F PR MEDICATION LIST DOCUMENTED IN MEDICAL RECORD: ICD-10-PCS | Mod: HCNC,CPTII,S$GLB, | Performed by: PODIATRIST

## 2021-05-28 PROCEDURE — 1157F ADVNC CARE PLAN IN RCRD: CPT | Mod: HCNC,CPTII,S$GLB, | Performed by: PODIATRIST

## 2021-05-28 PROCEDURE — 99212 OFFICE O/P EST SF 10 MIN: CPT | Mod: HCNC,S$GLB,, | Performed by: PODIATRIST

## 2021-05-28 PROCEDURE — 3008F PR BODY MASS INDEX (BMI) DOCUMENTED: ICD-10-PCS | Mod: HCNC,CPTII,S$GLB, | Performed by: PODIATRIST

## 2021-05-28 PROCEDURE — 3077F SYST BP >= 140 MM HG: CPT | Mod: HCNC,CPTII,S$GLB, | Performed by: PODIATRIST

## 2021-05-28 PROCEDURE — 1126F AMNT PAIN NOTED NONE PRSNT: CPT | Mod: HCNC,CPTII,S$GLB, | Performed by: PODIATRIST

## 2021-05-28 PROCEDURE — 1159F MED LIST DOCD IN RCRD: CPT | Mod: HCNC,CPTII,S$GLB, | Performed by: PODIATRIST

## 2021-05-28 NOTE — PROGRESS NOTES
Individual Psychotherapy (PhD/LCSW)    4/21/2017    Site: [facility]    Therapeutic Intervention: Met with patient alone.  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 Pt is relieved, states the papers are not really complete, now considers taking FMLA time off to complete the work. They are understaffed and this has added to her stress and frustration. Pt went out of town for Pass[place] and enjoyed this. She is stable, but still having difficulty with time management.    Treatment plan:  · Target symptoms: depression, anxiety, distractibility  · Why chosen therapy is appropriate versus another modality: relevant to diagnosis  · Outcome monitoring methods: self-report  · Therapeutic intervention type: insight oriented psychotherapy    Risk parameters:  Patient reports no suicidal ideation  Patient reports no homicidal ideation  Patient reports no self-injurious behavior  Patient reports no violent behavior    Verbal deficits: None    Patient's response to intervention:  The patient's response to intervention is motivated    Progress toward goals and other mental status changes:  The patient's progress toward goals is fair    Diagnosis: Major depression, recurrent moderate, anxiety,  ADD     Plan:  individual psychotherapy    Return to clinic: as scheduled   no

## 2021-06-01 ENCOUNTER — TELEPHONE (OUTPATIENT)
Dept: NEUROLOGY | Facility: CLINIC | Age: 71
End: 2021-06-01

## 2021-06-04 ENCOUNTER — HOSPITAL ENCOUNTER (OUTPATIENT)
Dept: RADIOLOGY | Facility: HOSPITAL | Age: 71
Discharge: HOME | End: 2021-06-04
Attending: INTERNAL MEDICINE
Payer: MEDICARE

## 2021-06-04 ENCOUNTER — OFFICE VISIT (OUTPATIENT)
Dept: INTERNAL MEDICINE | Facility: CLINIC | Age: 71
End: 2021-06-04
Payer: MEDICARE

## 2021-06-04 VITALS
WEIGHT: 158 LBS | BODY MASS INDEX: 23.95 KG/M2 | HEART RATE: 70 BPM | DIASTOLIC BLOOD PRESSURE: 78 MMHG | SYSTOLIC BLOOD PRESSURE: 118 MMHG | OXYGEN SATURATION: 98 % | HEIGHT: 68 IN

## 2021-06-04 DIAGNOSIS — Z01.810 PRE-OPERATIVE CARDIOVASCULAR EXAMINATION: ICD-10-CM

## 2021-06-04 DIAGNOSIS — R73.9 HYPERGLYCEMIA: Primary | ICD-10-CM

## 2021-06-04 DIAGNOSIS — N39.0 URINARY TRACT INFECTION WITHOUT HEMATURIA, SITE UNSPECIFIED: ICD-10-CM

## 2021-06-04 DIAGNOSIS — Z01.810 PREOP CARDIOVASCULAR EXAM: ICD-10-CM

## 2021-06-04 DIAGNOSIS — E55.9 MILD VITAMIN D DEFICIENCY: ICD-10-CM

## 2021-06-04 LAB
BILIRUB UR QL STRIP: NEGATIVE
CLARITY UR REFRACT.AUTO: CLEAR
COLOR UR AUTO: YELLOW
GLUCOSE UR QL STRIP: NEGATIVE
HGB UR QL STRIP: NEGATIVE
KETONES UR QL STRIP: NEGATIVE
LEUKOCYTE ESTERASE UR QL STRIP: NEGATIVE
NITRITE UR QL STRIP: NEGATIVE
PH UR STRIP: 5 [PH] (ref 5–8)
PROT UR QL STRIP: NEGATIVE
SP GR UR STRIP: 1.02 (ref 1–1.03)
URN SPEC COLLECT METH UR: NORMAL

## 2021-06-04 PROCEDURE — 1157F ADVNC CARE PLAN IN RCRD: CPT | Mod: S$GLB,,, | Performed by: INTERNAL MEDICINE

## 2021-06-04 PROCEDURE — 71046 XR CHEST PA AND LATERAL: ICD-10-PCS | Mod: 26,,, | Performed by: RADIOLOGY

## 2021-06-04 PROCEDURE — 90471 TD VACCINE GREATER THAN OR EQUAL TO 7YO PRESERVATIVE FREE IM: ICD-10-PCS | Mod: S$GLB,,, | Performed by: INTERNAL MEDICINE

## 2021-06-04 PROCEDURE — 93005 EKG 12-LEAD: ICD-10-PCS | Mod: S$GLB,,, | Performed by: INTERNAL MEDICINE

## 2021-06-04 PROCEDURE — 1159F MED LIST DOCD IN RCRD: CPT | Mod: S$GLB,,, | Performed by: INTERNAL MEDICINE

## 2021-06-04 PROCEDURE — 99214 PR OFFICE/OUTPT VISIT, EST, LEVL IV, 30-39 MIN: ICD-10-PCS | Mod: 25,S$GLB,, | Performed by: INTERNAL MEDICINE

## 2021-06-04 PROCEDURE — 3288F PR FALLS RISK ASSESSMENT DOCUMENTED: ICD-10-PCS | Mod: CPTII,S$GLB,, | Performed by: INTERNAL MEDICINE

## 2021-06-04 PROCEDURE — 93010 EKG 12-LEAD: ICD-10-PCS | Mod: S$GLB,,, | Performed by: INTERNAL MEDICINE

## 2021-06-04 PROCEDURE — 81003 URINALYSIS AUTO W/O SCOPE: CPT | Performed by: INTERNAL MEDICINE

## 2021-06-04 PROCEDURE — 90714 TD VACCINE GREATER THAN OR EQUAL TO 7YO PRESERVATIVE FREE IM: ICD-10-PCS | Mod: S$GLB,,, | Performed by: INTERNAL MEDICINE

## 2021-06-04 PROCEDURE — 99999 PR PBB SHADOW E&M-EST. PATIENT-LVL III: ICD-10-PCS | Mod: PBBFAC,,, | Performed by: INTERNAL MEDICINE

## 2021-06-04 PROCEDURE — 93010 ELECTROCARDIOGRAM REPORT: CPT | Mod: S$GLB,,, | Performed by: INTERNAL MEDICINE

## 2021-06-04 PROCEDURE — 1101F PR PT FALLS ASSESS DOC 0-1 FALLS W/OUT INJ PAST YR: ICD-10-PCS | Mod: CPTII,S$GLB,, | Performed by: INTERNAL MEDICINE

## 2021-06-04 PROCEDURE — 93005 ELECTROCARDIOGRAM TRACING: CPT | Mod: S$GLB,,, | Performed by: INTERNAL MEDICINE

## 2021-06-04 PROCEDURE — 99999 PR PBB SHADOW E&M-EST. PATIENT-LVL III: CPT | Mod: PBBFAC,,, | Performed by: INTERNAL MEDICINE

## 2021-06-04 PROCEDURE — 1157F PR ADVANCE CARE PLAN OR EQUIV PRESENT IN MEDICAL RECORD: ICD-10-PCS | Mod: S$GLB,,, | Performed by: INTERNAL MEDICINE

## 2021-06-04 PROCEDURE — 71046 X-RAY EXAM CHEST 2 VIEWS: CPT | Mod: 26,,, | Performed by: RADIOLOGY

## 2021-06-04 PROCEDURE — 3288F FALL RISK ASSESSMENT DOCD: CPT | Mod: CPTII,S$GLB,, | Performed by: INTERNAL MEDICINE

## 2021-06-04 PROCEDURE — 71046 X-RAY EXAM CHEST 2 VIEWS: CPT | Mod: TC

## 2021-06-04 PROCEDURE — 1159F PR MEDICATION LIST DOCUMENTED IN MEDICAL RECORD: ICD-10-PCS | Mod: S$GLB,,, | Performed by: INTERNAL MEDICINE

## 2021-06-04 PROCEDURE — 3008F BODY MASS INDEX DOCD: CPT | Mod: CPTII,S$GLB,, | Performed by: INTERNAL MEDICINE

## 2021-06-04 PROCEDURE — 3008F PR BODY MASS INDEX (BMI) DOCUMENTED: ICD-10-PCS | Mod: CPTII,S$GLB,, | Performed by: INTERNAL MEDICINE

## 2021-06-04 PROCEDURE — 90714 TD VACC NO PRESV 7 YRS+ IM: CPT | Mod: S$GLB,,, | Performed by: INTERNAL MEDICINE

## 2021-06-04 PROCEDURE — 87086 URINE CULTURE/COLONY COUNT: CPT | Performed by: INTERNAL MEDICINE

## 2021-06-04 PROCEDURE — 90471 IMMUNIZATION ADMIN: CPT | Mod: S$GLB,,, | Performed by: INTERNAL MEDICINE

## 2021-06-04 PROCEDURE — 99214 OFFICE O/P EST MOD 30 MIN: CPT | Mod: 25,S$GLB,, | Performed by: INTERNAL MEDICINE

## 2021-06-04 PROCEDURE — 1101F PT FALLS ASSESS-DOCD LE1/YR: CPT | Mod: CPTII,S$GLB,, | Performed by: INTERNAL MEDICINE

## 2021-06-04 RX ORDER — FLUCONAZOLE 150 MG/1
150 TABLET ORAL DAILY
Qty: 1 TABLET | Refills: 1 | Status: SHIPPED | OUTPATIENT
Start: 2021-06-04 | End: 2021-06-05

## 2021-06-04 RX ORDER — DENOSUMAB 60 MG/ML
INJECTION SUBCUTANEOUS
COMMUNITY
Start: 2021-02-09 | End: 2021-07-07 | Stop reason: SDUPTHER

## 2021-06-04 RX ORDER — AMOXICILLIN AND CLAVULANATE POTASSIUM 875; 125 MG/1; MG/1
1 TABLET, FILM COATED ORAL 2 TIMES DAILY
Qty: 14 TABLET | Refills: 0 | Status: SHIPPED | OUTPATIENT
Start: 2021-06-04 | End: 2021-06-11

## 2021-06-05 LAB — BACTERIA UR CULT: NO GROWTH

## 2021-06-07 ENCOUNTER — TELEPHONE (OUTPATIENT)
Dept: CARDIOLOGY | Facility: CLINIC | Age: 71
End: 2021-06-07

## 2021-06-07 ENCOUNTER — TELEPHONE (OUTPATIENT)
Dept: INTERNAL MEDICINE | Facility: CLINIC | Age: 71
End: 2021-06-07

## 2021-06-09 DIAGNOSIS — R93.5 ABNORMAL FINDINGS ON DIAGNOSTIC IMAGING OF OTHER ABDOMINAL REGIONS, INCLUDING RETROPERITONEUM: ICD-10-CM

## 2021-06-09 DIAGNOSIS — K86.2 PANCREATIC CYST: ICD-10-CM

## 2021-06-10 ENCOUNTER — HOSPITAL ENCOUNTER (OUTPATIENT)
Dept: PREADMISSION TESTING | Facility: HOSPITAL | Age: 71
Discharge: HOME | End: 2021-06-10
Attending: NEUROLOGICAL SURGERY
Payer: MEDICARE

## 2021-06-10 ENCOUNTER — OFFICE VISIT (OUTPATIENT)
Dept: CARDIOLOGY | Facility: CLINIC | Age: 71
End: 2021-06-10
Payer: MEDICARE

## 2021-06-10 VITALS
HEIGHT: 69 IN | DIASTOLIC BLOOD PRESSURE: 62 MMHG | SYSTOLIC BLOOD PRESSURE: 125 MMHG | WEIGHT: 157.19 LBS | OXYGEN SATURATION: 100 % | HEART RATE: 63 BPM | BODY MASS INDEX: 23.28 KG/M2

## 2021-06-10 VITALS
RESPIRATION RATE: 16 BRPM | DIASTOLIC BLOOD PRESSURE: 71 MMHG | TEMPERATURE: 98 F | HEART RATE: 68 BPM | OXYGEN SATURATION: 100 % | WEIGHT: 157 LBS | SYSTOLIC BLOOD PRESSURE: 150 MMHG | BODY MASS INDEX: 23.25 KG/M2 | HEIGHT: 69 IN

## 2021-06-10 DIAGNOSIS — I10 ESSENTIAL HYPERTENSION: ICD-10-CM

## 2021-06-10 DIAGNOSIS — I49.5 SSS (SICK SINUS SYNDROME): ICD-10-CM

## 2021-06-10 DIAGNOSIS — N18.30 STAGE 3 CHRONIC KIDNEY DISEASE, UNSPECIFIED WHETHER STAGE 3A OR 3B CKD: ICD-10-CM

## 2021-06-10 DIAGNOSIS — G47.33 OSA (OBSTRUCTIVE SLEEP APNEA): ICD-10-CM

## 2021-06-10 DIAGNOSIS — E78.5 DYSLIPIDEMIA: ICD-10-CM

## 2021-06-10 DIAGNOSIS — Z95.0 CARDIAC PACEMAKER IN SITU: ICD-10-CM

## 2021-06-10 DIAGNOSIS — Z01.810 PREOPERATIVE CARDIOVASCULAR EXAMINATION: Primary | ICD-10-CM

## 2021-06-10 PROCEDURE — 1157F PR ADVANCE CARE PLAN OR EQUIV PRESENT IN MEDICAL RECORD: ICD-10-PCS | Mod: S$GLB,,, | Performed by: NURSE PRACTITIONER

## 2021-06-10 PROCEDURE — 1159F MED LIST DOCD IN RCRD: CPT | Mod: S$GLB,,, | Performed by: NURSE PRACTITIONER

## 2021-06-10 PROCEDURE — 3008F BODY MASS INDEX DOCD: CPT | Mod: CPTII,S$GLB,, | Performed by: NURSE PRACTITIONER

## 2021-06-10 PROCEDURE — 99499 UNLISTED E&M SERVICE: CPT | Mod: S$GLB,,, | Performed by: NURSE PRACTITIONER

## 2021-06-10 PROCEDURE — 3008F PR BODY MASS INDEX (BMI) DOCUMENTED: ICD-10-PCS | Mod: CPTII,S$GLB,, | Performed by: NURSE PRACTITIONER

## 2021-06-10 PROCEDURE — 1126F AMNT PAIN NOTED NONE PRSNT: CPT | Mod: S$GLB,,, | Performed by: NURSE PRACTITIONER

## 2021-06-10 PROCEDURE — 99999 PR PBB SHADOW E&M-EST. PATIENT-LVL V: CPT | Mod: PBBFAC,,, | Performed by: NURSE PRACTITIONER

## 2021-06-10 PROCEDURE — 1126F PR PAIN SEVERITY QUANTIFIED, NO PAIN PRESENT: ICD-10-PCS | Mod: S$GLB,,, | Performed by: NURSE PRACTITIONER

## 2021-06-10 PROCEDURE — 99499 RISK ADDL DX/OHS AUDIT: ICD-10-PCS | Mod: S$GLB,,, | Performed by: NURSE PRACTITIONER

## 2021-06-10 PROCEDURE — 1159F PR MEDICATION LIST DOCUMENTED IN MEDICAL RECORD: ICD-10-PCS | Mod: S$GLB,,, | Performed by: NURSE PRACTITIONER

## 2021-06-10 PROCEDURE — 99214 PR OFFICE/OUTPT VISIT, EST, LEVL IV, 30-39 MIN: ICD-10-PCS | Mod: S$GLB,,, | Performed by: NURSE PRACTITIONER

## 2021-06-10 PROCEDURE — 99214 OFFICE O/P EST MOD 30 MIN: CPT | Mod: S$GLB,,, | Performed by: NURSE PRACTITIONER

## 2021-06-10 PROCEDURE — 99999 PR PBB SHADOW E&M-EST. PATIENT-LVL V: ICD-10-PCS | Mod: PBBFAC,,, | Performed by: NURSE PRACTITIONER

## 2021-06-10 PROCEDURE — 1157F ADVNC CARE PLAN IN RCRD: CPT | Mod: S$GLB,,, | Performed by: NURSE PRACTITIONER

## 2021-06-11 ENCOUNTER — TELEPHONE (OUTPATIENT)
Dept: NEUROSURGERY | Facility: CLINIC | Age: 71
End: 2021-06-11

## 2021-06-11 ENCOUNTER — DOCUMENTATION ONLY (OUTPATIENT)
Dept: CARDIOLOGY | Facility: HOSPITAL | Age: 71
End: 2021-06-11

## 2021-06-13 ENCOUNTER — TELEPHONE (OUTPATIENT)
Dept: ENDOSCOPY | Facility: HOSPITAL | Age: 71
End: 2021-06-13

## 2021-06-14 ENCOUNTER — TELEPHONE (OUTPATIENT)
Dept: CARDIOLOGY | Facility: CLINIC | Age: 71
End: 2021-06-14

## 2021-06-14 ENCOUNTER — OFFICE VISIT (OUTPATIENT)
Dept: URGENT CARE | Facility: CLINIC | Age: 71
End: 2021-06-14
Payer: MEDICARE

## 2021-06-14 ENCOUNTER — PATIENT MESSAGE (OUTPATIENT)
Dept: INTERNAL MEDICINE | Facility: CLINIC | Age: 71
End: 2021-06-14

## 2021-06-14 VITALS
BODY MASS INDEX: 23.25 KG/M2 | WEIGHT: 157 LBS | HEART RATE: 76 BPM | RESPIRATION RATE: 16 BRPM | TEMPERATURE: 100 F | SYSTOLIC BLOOD PRESSURE: 114 MMHG | OXYGEN SATURATION: 98 % | DIASTOLIC BLOOD PRESSURE: 62 MMHG | HEIGHT: 69 IN

## 2021-06-14 DIAGNOSIS — R19.7 DIARRHEA, UNSPECIFIED TYPE: Primary | ICD-10-CM

## 2021-06-14 DIAGNOSIS — R50.9 FEVER, UNSPECIFIED FEVER CAUSE: ICD-10-CM

## 2021-06-14 DIAGNOSIS — R35.0 URINARY FREQUENCY: ICD-10-CM

## 2021-06-14 DIAGNOSIS — R11.0 NAUSEA: ICD-10-CM

## 2021-06-14 DIAGNOSIS — R10.9 ABDOMINAL CRAMPING: ICD-10-CM

## 2021-06-14 LAB
BILIRUB UR QL STRIP: NEGATIVE
C DIFF GDH STL QL: POSITIVE
C DIFF TOX A+B STL QL IA: POSITIVE
CTP QC/QA: YES
GLUCOSE UR QL STRIP: NEGATIVE
KETONES UR QL STRIP: POSITIVE
LEUKOCYTE ESTERASE UR QL STRIP: NEGATIVE
PH, POC UA: 5.5 (ref 5–8)
POC BLOOD, URINE: POSITIVE
POC NITRATES, URINE: NEGATIVE
PROT UR QL STRIP: NEGATIVE
SARS-COV-2 RDRP RESP QL NAA+PROBE: NEGATIVE
SP GR UR STRIP: 1 (ref 1–1.03)
UROBILINOGEN UR STRIP-ACNC: NORMAL (ref 0.1–1.1)

## 2021-06-14 PROCEDURE — 87077 CULTURE AEROBIC IDENTIFY: CPT | Performed by: NURSE PRACTITIONER

## 2021-06-14 PROCEDURE — 87086 URINE CULTURE/COLONY COUNT: CPT | Performed by: NURSE PRACTITIONER

## 2021-06-14 PROCEDURE — 99214 OFFICE O/P EST MOD 30 MIN: CPT | Mod: S$GLB,,, | Performed by: NURSE PRACTITIONER

## 2021-06-14 PROCEDURE — 87449 NOS EACH ORGANISM AG IA: CPT | Performed by: NURSE PRACTITIONER

## 2021-06-14 PROCEDURE — 87088 URINE BACTERIA CULTURE: CPT | Performed by: NURSE PRACTITIONER

## 2021-06-14 PROCEDURE — 99214 PR OFFICE/OUTPT VISIT, EST, LEVL IV, 30-39 MIN: ICD-10-PCS | Mod: S$GLB,,, | Performed by: NURSE PRACTITIONER

## 2021-06-14 PROCEDURE — 1157F ADVNC CARE PLAN IN RCRD: CPT | Mod: S$GLB,,, | Performed by: NURSE PRACTITIONER

## 2021-06-14 PROCEDURE — 3008F PR BODY MASS INDEX (BMI) DOCUMENTED: ICD-10-PCS | Mod: CPTII,S$GLB,, | Performed by: NURSE PRACTITIONER

## 2021-06-14 PROCEDURE — 3008F BODY MASS INDEX DOCD: CPT | Mod: CPTII,S$GLB,, | Performed by: NURSE PRACTITIONER

## 2021-06-14 PROCEDURE — 87186 SC STD MICRODIL/AGAR DIL: CPT | Performed by: NURSE PRACTITIONER

## 2021-06-14 PROCEDURE — 1157F PR ADVANCE CARE PLAN OR EQUIV PRESENT IN MEDICAL RECORD: ICD-10-PCS | Mod: S$GLB,,, | Performed by: NURSE PRACTITIONER

## 2021-06-14 PROCEDURE — 87324 CLOSTRIDIUM AG IA: CPT | Performed by: NURSE PRACTITIONER

## 2021-06-14 RX ORDER — DICYCLOMINE HYDROCHLORIDE 20 MG/1
20 TABLET ORAL EVERY 6 HOURS
Qty: 20 TABLET | Refills: 0 | Status: SHIPPED | OUTPATIENT
Start: 2021-06-14 | End: 2021-07-07

## 2021-06-14 RX ORDER — ONDANSETRON 4 MG/1
4 TABLET, ORALLY DISINTEGRATING ORAL EVERY 8 HOURS PRN
Qty: 12 TABLET | Refills: 0 | Status: SHIPPED | OUTPATIENT
Start: 2021-06-14 | End: 2021-10-22

## 2021-06-16 ENCOUNTER — PATIENT MESSAGE (OUTPATIENT)
Dept: INTERNAL MEDICINE | Facility: CLINIC | Age: 71
End: 2021-06-16

## 2021-06-16 ENCOUNTER — TELEPHONE (OUTPATIENT)
Dept: URGENT CARE | Facility: CLINIC | Age: 71
End: 2021-06-16

## 2021-06-16 ENCOUNTER — TELEPHONE (OUTPATIENT)
Dept: INTERNAL MEDICINE | Facility: CLINIC | Age: 71
End: 2021-06-16

## 2021-06-16 DIAGNOSIS — A04.72 C. DIFFICILE DIARRHEA: Primary | ICD-10-CM

## 2021-06-16 LAB — BACTERIA UR CULT: ABNORMAL

## 2021-06-16 RX ORDER — VANCOMYCIN HYDROCHLORIDE 125 MG/1
125 CAPSULE ORAL 4 TIMES DAILY
Qty: 40 CAPSULE | Refills: 0 | Status: SHIPPED | OUTPATIENT
Start: 2021-06-16 | End: 2021-06-26

## 2021-06-17 ENCOUNTER — TELEPHONE (OUTPATIENT)
Dept: URGENT CARE | Facility: CLINIC | Age: 71
End: 2021-06-17

## 2021-06-18 ENCOUNTER — TELEPHONE (OUTPATIENT)
Dept: URGENT CARE | Facility: CLINIC | Age: 71
End: 2021-06-18

## 2021-06-18 ENCOUNTER — OFFICE VISIT (OUTPATIENT)
Dept: INFECTIOUS DISEASES | Facility: CLINIC | Age: 71
End: 2021-06-18
Payer: MEDICARE

## 2021-06-18 VITALS
HEIGHT: 69 IN | TEMPERATURE: 98 F | WEIGHT: 151.88 LBS | BODY MASS INDEX: 22.49 KG/M2 | DIASTOLIC BLOOD PRESSURE: 74 MMHG | HEART RATE: 80 BPM | SYSTOLIC BLOOD PRESSURE: 128 MMHG

## 2021-06-18 DIAGNOSIS — A49.8 CLOSTRIDIUM DIFFICILE INFECTION: Primary | ICD-10-CM

## 2021-06-18 DIAGNOSIS — R82.71 BACTERIURIA: ICD-10-CM

## 2021-06-18 PROCEDURE — 1159F PR MEDICATION LIST DOCUMENTED IN MEDICAL RECORD: ICD-10-PCS | Mod: S$GLB,,, | Performed by: PHYSICIAN ASSISTANT

## 2021-06-18 PROCEDURE — 1159F MED LIST DOCD IN RCRD: CPT | Mod: S$GLB,,, | Performed by: PHYSICIAN ASSISTANT

## 2021-06-18 PROCEDURE — 99999 PR PBB SHADOW E&M-EST. PATIENT-LVL III: ICD-10-PCS | Mod: PBBFAC,,, | Performed by: PHYSICIAN ASSISTANT

## 2021-06-18 PROCEDURE — 1157F ADVNC CARE PLAN IN RCRD: CPT | Mod: S$GLB,,, | Performed by: PHYSICIAN ASSISTANT

## 2021-06-18 PROCEDURE — 99204 OFFICE O/P NEW MOD 45 MIN: CPT | Mod: S$GLB,,, | Performed by: PHYSICIAN ASSISTANT

## 2021-06-18 PROCEDURE — 3288F FALL RISK ASSESSMENT DOCD: CPT | Mod: CPTII,S$GLB,, | Performed by: PHYSICIAN ASSISTANT

## 2021-06-18 PROCEDURE — 1101F PT FALLS ASSESS-DOCD LE1/YR: CPT | Mod: CPTII,S$GLB,, | Performed by: PHYSICIAN ASSISTANT

## 2021-06-18 PROCEDURE — 3008F BODY MASS INDEX DOCD: CPT | Mod: CPTII,S$GLB,, | Performed by: PHYSICIAN ASSISTANT

## 2021-06-18 PROCEDURE — 1125F PR PAIN SEVERITY QUANTIFIED, PAIN PRESENT: ICD-10-PCS | Mod: S$GLB,,, | Performed by: PHYSICIAN ASSISTANT

## 2021-06-18 PROCEDURE — 99204 PR OFFICE/OUTPT VISIT, NEW, LEVL IV, 45-59 MIN: ICD-10-PCS | Mod: S$GLB,,, | Performed by: PHYSICIAN ASSISTANT

## 2021-06-18 PROCEDURE — 99999 PR PBB SHADOW E&M-EST. PATIENT-LVL III: CPT | Mod: PBBFAC,,, | Performed by: PHYSICIAN ASSISTANT

## 2021-06-18 PROCEDURE — 1157F PR ADVANCE CARE PLAN OR EQUIV PRESENT IN MEDICAL RECORD: ICD-10-PCS | Mod: S$GLB,,, | Performed by: PHYSICIAN ASSISTANT

## 2021-06-18 PROCEDURE — 1125F AMNT PAIN NOTED PAIN PRSNT: CPT | Mod: S$GLB,,, | Performed by: PHYSICIAN ASSISTANT

## 2021-06-18 PROCEDURE — 3288F PR FALLS RISK ASSESSMENT DOCUMENTED: ICD-10-PCS | Mod: CPTII,S$GLB,, | Performed by: PHYSICIAN ASSISTANT

## 2021-06-18 PROCEDURE — 3008F PR BODY MASS INDEX (BMI) DOCUMENTED: ICD-10-PCS | Mod: CPTII,S$GLB,, | Performed by: PHYSICIAN ASSISTANT

## 2021-06-18 PROCEDURE — 1101F PR PT FALLS ASSESS DOC 0-1 FALLS W/OUT INJ PAST YR: ICD-10-PCS | Mod: CPTII,S$GLB,, | Performed by: PHYSICIAN ASSISTANT

## 2021-06-22 ENCOUNTER — TELEPHONE (OUTPATIENT)
Dept: ENDOSCOPY | Facility: HOSPITAL | Age: 71
End: 2021-06-22

## 2021-06-22 ENCOUNTER — TELEPHONE (OUTPATIENT)
Dept: URGENT CARE | Facility: CLINIC | Age: 71
End: 2021-06-22

## 2021-06-23 ENCOUNTER — TELEPHONE (OUTPATIENT)
Dept: INFECTIOUS DISEASES | Facility: CLINIC | Age: 71
End: 2021-06-23

## 2021-06-23 ENCOUNTER — PATIENT MESSAGE (OUTPATIENT)
Dept: ENDOSCOPY | Facility: HOSPITAL | Age: 71
End: 2021-06-23

## 2021-06-24 ENCOUNTER — TELEPHONE (OUTPATIENT)
Dept: INFECTIOUS DISEASES | Facility: CLINIC | Age: 71
End: 2021-06-24

## 2021-06-24 ENCOUNTER — PATIENT MESSAGE (OUTPATIENT)
Dept: INFECTIOUS DISEASES | Facility: CLINIC | Age: 71
End: 2021-06-24

## 2021-06-25 ENCOUNTER — TELEPHONE (OUTPATIENT)
Dept: NEUROSURGERY | Facility: CLINIC | Age: 71
End: 2021-06-25

## 2021-06-29 ENCOUNTER — TELEPHONE (OUTPATIENT)
Dept: NEUROLOGY | Facility: CLINIC | Age: 71
End: 2021-06-29

## 2021-07-07 ENCOUNTER — PATIENT MESSAGE (OUTPATIENT)
Dept: INFECTIOUS DISEASES | Facility: CLINIC | Age: 71
End: 2021-07-07

## 2021-07-07 ENCOUNTER — TELEPHONE (OUTPATIENT)
Dept: INFECTIOUS DISEASES | Facility: HOSPITAL | Age: 71
End: 2021-07-07

## 2021-07-07 ENCOUNTER — OFFICE VISIT (OUTPATIENT)
Dept: CARDIOLOGY | Facility: CLINIC | Age: 71
End: 2021-07-07
Payer: MEDICARE

## 2021-07-07 VITALS
WEIGHT: 154.31 LBS | HEART RATE: 75 BPM | DIASTOLIC BLOOD PRESSURE: 55 MMHG | BODY MASS INDEX: 22.85 KG/M2 | HEIGHT: 69 IN | SYSTOLIC BLOOD PRESSURE: 102 MMHG

## 2021-07-07 DIAGNOSIS — E78.5 DYSLIPIDEMIA: ICD-10-CM

## 2021-07-07 DIAGNOSIS — Z95.0 CARDIAC PACEMAKER IN SITU: ICD-10-CM

## 2021-07-07 DIAGNOSIS — I10 ESSENTIAL HYPERTENSION: Primary | Chronic | ICD-10-CM

## 2021-07-07 DIAGNOSIS — I49.5 SSS (SICK SINUS SYNDROME): Chronic | ICD-10-CM

## 2021-07-07 DIAGNOSIS — I44.0 AV BLOCK, 1ST DEGREE: Chronic | ICD-10-CM

## 2021-07-07 DIAGNOSIS — N18.30 STAGE 3 CHRONIC KIDNEY DISEASE, UNSPECIFIED WHETHER STAGE 3A OR 3B CKD: ICD-10-CM

## 2021-07-07 DIAGNOSIS — G47.33 OSA (OBSTRUCTIVE SLEEP APNEA): ICD-10-CM

## 2021-07-07 DIAGNOSIS — I45.10 RBBB: ICD-10-CM

## 2021-07-07 PROCEDURE — 99999 PR PBB SHADOW E&M-EST. PATIENT-LVL IV: ICD-10-PCS | Mod: PBBFAC,,, | Performed by: INTERNAL MEDICINE

## 2021-07-07 PROCEDURE — 3078F DIAST BP <80 MM HG: CPT | Mod: CPTII,S$GLB,, | Performed by: INTERNAL MEDICINE

## 2021-07-07 PROCEDURE — 1126F AMNT PAIN NOTED NONE PRSNT: CPT | Mod: S$GLB,,, | Performed by: INTERNAL MEDICINE

## 2021-07-07 PROCEDURE — 1126F PR PAIN SEVERITY QUANTIFIED, NO PAIN PRESENT: ICD-10-PCS | Mod: S$GLB,,, | Performed by: INTERNAL MEDICINE

## 2021-07-07 PROCEDURE — 1159F MED LIST DOCD IN RCRD: CPT | Mod: S$GLB,,, | Performed by: INTERNAL MEDICINE

## 2021-07-07 PROCEDURE — 99999 PR PBB SHADOW E&M-EST. PATIENT-LVL IV: CPT | Mod: PBBFAC,,, | Performed by: INTERNAL MEDICINE

## 2021-07-07 PROCEDURE — 1157F PR ADVANCE CARE PLAN OR EQUIV PRESENT IN MEDICAL RECORD: ICD-10-PCS | Mod: S$GLB,,, | Performed by: INTERNAL MEDICINE

## 2021-07-07 PROCEDURE — 3078F PR MOST RECENT DIASTOLIC BLOOD PRESSURE < 80 MM HG: ICD-10-PCS | Mod: CPTII,S$GLB,, | Performed by: INTERNAL MEDICINE

## 2021-07-07 PROCEDURE — 1159F PR MEDICATION LIST DOCUMENTED IN MEDICAL RECORD: ICD-10-PCS | Mod: S$GLB,,, | Performed by: INTERNAL MEDICINE

## 2021-07-07 PROCEDURE — 1157F ADVNC CARE PLAN IN RCRD: CPT | Mod: S$GLB,,, | Performed by: INTERNAL MEDICINE

## 2021-07-07 PROCEDURE — 3288F PR FALLS RISK ASSESSMENT DOCUMENTED: ICD-10-PCS | Mod: CPTII,S$GLB,, | Performed by: INTERNAL MEDICINE

## 2021-07-07 PROCEDURE — 1101F PR PT FALLS ASSESS DOC 0-1 FALLS W/OUT INJ PAST YR: ICD-10-PCS | Mod: CPTII,S$GLB,, | Performed by: INTERNAL MEDICINE

## 2021-07-07 PROCEDURE — 3008F PR BODY MASS INDEX (BMI) DOCUMENTED: ICD-10-PCS | Mod: CPTII,S$GLB,, | Performed by: INTERNAL MEDICINE

## 2021-07-07 PROCEDURE — 3074F PR MOST RECENT SYSTOLIC BLOOD PRESSURE < 130 MM HG: ICD-10-PCS | Mod: CPTII,S$GLB,, | Performed by: INTERNAL MEDICINE

## 2021-07-07 PROCEDURE — 3074F SYST BP LT 130 MM HG: CPT | Mod: CPTII,S$GLB,, | Performed by: INTERNAL MEDICINE

## 2021-07-07 PROCEDURE — 99213 PR OFFICE/OUTPT VISIT, EST, LEVL III, 20-29 MIN: ICD-10-PCS | Mod: S$GLB,,, | Performed by: INTERNAL MEDICINE

## 2021-07-07 PROCEDURE — 3008F BODY MASS INDEX DOCD: CPT | Mod: CPTII,S$GLB,, | Performed by: INTERNAL MEDICINE

## 2021-07-07 PROCEDURE — 3288F FALL RISK ASSESSMENT DOCD: CPT | Mod: CPTII,S$GLB,, | Performed by: INTERNAL MEDICINE

## 2021-07-07 PROCEDURE — 1101F PT FALLS ASSESS-DOCD LE1/YR: CPT | Mod: CPTII,S$GLB,, | Performed by: INTERNAL MEDICINE

## 2021-07-07 PROCEDURE — 99213 OFFICE O/P EST LOW 20 MIN: CPT | Mod: S$GLB,,, | Performed by: INTERNAL MEDICINE

## 2021-07-12 ENCOUNTER — HOSPITAL ENCOUNTER (OUTPATIENT)
Facility: HOSPITAL | Age: 71
Discharge: HOME | End: 2021-07-14
Attending: EMERGENCY MEDICINE | Admitting: INTERNAL MEDICINE
Payer: MEDICARE

## 2021-07-12 DIAGNOSIS — A04.71 RECURRENT COLITIS DUE TO CLOSTRIDIUM DIFFICILE: ICD-10-CM

## 2021-07-12 DIAGNOSIS — I10 ESSENTIAL HYPERTENSION: Chronic | ICD-10-CM

## 2021-07-12 DIAGNOSIS — R07.9 CHEST PAIN: ICD-10-CM

## 2021-07-12 DIAGNOSIS — A04.71 RECURRENT CLOSTRIDIUM DIFFICILE DIARRHEA: Primary | ICD-10-CM

## 2021-07-12 DIAGNOSIS — R19.7 DIARRHEA: ICD-10-CM

## 2021-07-12 LAB
ALBUMIN SERPL BCP-MCNC: 3.4 G/DL (ref 3.5–5.2)
ALP SERPL-CCNC: 78 U/L (ref 55–135)
ALT SERPL W/O P-5'-P-CCNC: 35 U/L (ref 10–44)
ANION GAP SERPL CALC-SCNC: 14 MMOL/L (ref 8–16)
AST SERPL-CCNC: 26 U/L (ref 10–40)
BACTERIA #/AREA URNS AUTO: ABNORMAL /HPF
BASOPHILS # BLD AUTO: 0.05 K/UL (ref 0–0.2)
BASOPHILS NFR BLD: 0.4 % (ref 0–1.9)
BILIRUB SERPL-MCNC: 2.3 MG/DL (ref 0.1–1)
BILIRUB UR QL STRIP: NEGATIVE
BUN SERPL-MCNC: 15 MG/DL (ref 6–30)
BUN SERPL-MCNC: 15 MG/DL (ref 8–23)
C DIFF GDH STL QL: POSITIVE
C DIFF TOX A+B STL QL IA: POSITIVE
CALCIUM SERPL-MCNC: 9 MG/DL (ref 8.7–10.5)
CHLORIDE SERPL-SCNC: 102 MMOL/L (ref 95–110)
CHLORIDE SERPL-SCNC: 104 MMOL/L (ref 95–110)
CLARITY UR REFRACT.AUTO: ABNORMAL
CO2 SERPL-SCNC: 18 MMOL/L (ref 23–29)
COLOR UR AUTO: YELLOW
CREAT SERPL-MCNC: 1 MG/DL (ref 0.5–1.4)
CREAT SERPL-MCNC: 1.1 MG/DL (ref 0.5–1.4)
DIFFERENTIAL METHOD: ABNORMAL
EOSINOPHIL # BLD AUTO: 0.2 K/UL (ref 0–0.5)
EOSINOPHIL NFR BLD: 1.9 % (ref 0–8)
ERYTHROCYTE [DISTWIDTH] IN BLOOD BY AUTOMATED COUNT: 13.8 % (ref 11.5–14.5)
EST. GFR  (AFRICAN AMERICAN): >60 ML/MIN/1.73 M^2
EST. GFR  (NON AFRICAN AMERICAN): 56.4 ML/MIN/1.73 M^2
GLUCOSE SERPL-MCNC: 102 MG/DL (ref 70–110)
GLUCOSE SERPL-MCNC: 95 MG/DL (ref 70–110)
GLUCOSE UR QL STRIP: NEGATIVE
HCT VFR BLD AUTO: 32.8 % (ref 37–48.5)
HCT VFR BLD CALC: 33 %PCV (ref 36–54)
HCV AB SERPL QL IA: NEGATIVE
HGB BLD-MCNC: 11 G/DL (ref 12–16)
HGB UR QL STRIP: ABNORMAL
HYALINE CASTS UR QL AUTO: 0 /LPF
IMM GRANULOCYTES # BLD AUTO: 0.04 K/UL (ref 0–0.04)
IMM GRANULOCYTES NFR BLD AUTO: 0.3 % (ref 0–0.5)
KETONES UR QL STRIP: ABNORMAL
LACTATE SERPL-SCNC: 1.9 MMOL/L (ref 0.5–2.2)
LACTATE SERPL-SCNC: 2.3 MMOL/L (ref 0.5–2.2)
LEUKOCYTE ESTERASE UR QL STRIP: NEGATIVE
LYMPHOCYTES # BLD AUTO: 2.5 K/UL (ref 1–4.8)
LYMPHOCYTES NFR BLD: 22 % (ref 18–48)
MAGNESIUM SERPL-MCNC: 2.1 MG/DL (ref 1.6–2.6)
MCH RBC QN AUTO: 30 PG (ref 27–31)
MCHC RBC AUTO-ENTMCNC: 33.5 G/DL (ref 32–36)
MCV RBC AUTO: 89 FL (ref 82–98)
MICROSCOPIC COMMENT: ABNORMAL
MONOCYTES # BLD AUTO: 1.9 K/UL (ref 0.3–1)
MONOCYTES NFR BLD: 16 % (ref 4–15)
NEUTROPHILS # BLD AUTO: 6.9 K/UL (ref 1.8–7.7)
NEUTROPHILS NFR BLD: 59.4 % (ref 38–73)
NITRITE UR QL STRIP: NEGATIVE
NRBC BLD-RTO: 0 /100 WBC
PH UR STRIP: 5 [PH] (ref 5–8)
PLATELET # BLD AUTO: 171 K/UL (ref 150–450)
PMV BLD AUTO: 11.5 FL (ref 9.2–12.9)
POC IONIZED CALCIUM: 1.07 MMOL/L (ref 1.06–1.42)
POC TCO2 (MEASURED): 19 MMOL/L (ref 23–29)
POTASSIUM BLD-SCNC: 3.2 MMOL/L (ref 3.5–5.1)
POTASSIUM SERPL-SCNC: 3.3 MMOL/L (ref 3.5–5.1)
PROT SERPL-MCNC: 6.8 G/DL (ref 6–8.4)
PROT UR QL STRIP: ABNORMAL
RBC # BLD AUTO: 3.67 M/UL (ref 4–5.4)
RBC #/AREA URNS AUTO: 5 /HPF (ref 0–4)
SAMPLE: ABNORMAL
SODIUM BLD-SCNC: 137 MMOL/L (ref 136–145)
SODIUM SERPL-SCNC: 136 MMOL/L (ref 136–145)
SP GR UR STRIP: >=1.03 (ref 1–1.03)
SQUAMOUS #/AREA URNS AUTO: 1 /HPF
URN SPEC COLLECT METH UR: ABNORMAL
WBC # BLD AUTO: 11.57 K/UL (ref 3.9–12.7)
WBC #/AREA URNS AUTO: 6 /HPF (ref 0–5)

## 2021-07-12 PROCEDURE — 25000003 PHARM REV CODE 250

## 2021-07-12 PROCEDURE — 93005 ELECTROCARDIOGRAM TRACING: CPT

## 2021-07-12 PROCEDURE — 96361 HYDRATE IV INFUSION ADD-ON: CPT

## 2021-07-12 PROCEDURE — 81001 URINALYSIS AUTO W/SCOPE: CPT | Performed by: EMERGENCY MEDICINE

## 2021-07-12 PROCEDURE — 99285 PR EMERGENCY DEPT VISIT,LEVEL V: ICD-10-PCS | Mod: ,,, | Performed by: EMERGENCY MEDICINE

## 2021-07-12 PROCEDURE — 85025 COMPLETE CBC W/AUTO DIFF WBC: CPT | Performed by: EMERGENCY MEDICINE

## 2021-07-12 PROCEDURE — 87449 NOS EACH ORGANISM AG IA: CPT | Performed by: EMERGENCY MEDICINE

## 2021-07-12 PROCEDURE — 83605 ASSAY OF LACTIC ACID: CPT | Mod: 91 | Performed by: EMERGENCY MEDICINE

## 2021-07-12 PROCEDURE — 93010 EKG 12-LEAD: ICD-10-PCS | Mod: ,,, | Performed by: INTERNAL MEDICINE

## 2021-07-12 PROCEDURE — 86803 HEPATITIS C AB TEST: CPT | Performed by: EMERGENCY MEDICINE

## 2021-07-12 PROCEDURE — 93010 ELECTROCARDIOGRAM REPORT: CPT | Mod: ,,, | Performed by: INTERNAL MEDICINE

## 2021-07-12 PROCEDURE — 63600175 PHARM REV CODE 636 W HCPCS

## 2021-07-12 PROCEDURE — 99285 EMERGENCY DEPT VISIT HI MDM: CPT | Mod: 25

## 2021-07-12 PROCEDURE — 96368 THER/DIAG CONCURRENT INF: CPT

## 2021-07-12 PROCEDURE — S0030 INJECTION, METRONIDAZOLE: HCPCS

## 2021-07-12 PROCEDURE — 87324 CLOSTRIDIUM AG IA: CPT | Performed by: EMERGENCY MEDICINE

## 2021-07-12 PROCEDURE — 96365 THER/PROPH/DIAG IV INF INIT: CPT | Mod: 59

## 2021-07-12 PROCEDURE — 80053 COMPREHEN METABOLIC PANEL: CPT | Performed by: EMERGENCY MEDICINE

## 2021-07-12 PROCEDURE — 99220 PR INITIAL OBSERVATION CARE,LEVL III: ICD-10-PCS | Mod: ,,, | Performed by: PHYSICIAN ASSISTANT

## 2021-07-12 PROCEDURE — G0378 HOSPITAL OBSERVATION PER HR: HCPCS

## 2021-07-12 PROCEDURE — 63600175 PHARM REV CODE 636 W HCPCS: Performed by: PHYSICIAN ASSISTANT

## 2021-07-12 PROCEDURE — 87045 FECES CULTURE AEROBIC BACT: CPT | Performed by: EMERGENCY MEDICINE

## 2021-07-12 PROCEDURE — 25000003 PHARM REV CODE 250: Performed by: EMERGENCY MEDICINE

## 2021-07-12 PROCEDURE — 87427 SHIGA-LIKE TOXIN AG IA: CPT | Mod: 59 | Performed by: EMERGENCY MEDICINE

## 2021-07-12 PROCEDURE — 99285 EMERGENCY DEPT VISIT HI MDM: CPT | Mod: ,,, | Performed by: EMERGENCY MEDICINE

## 2021-07-12 PROCEDURE — 87040 BLOOD CULTURE FOR BACTERIA: CPT | Performed by: EMERGENCY MEDICINE

## 2021-07-12 PROCEDURE — 83735 ASSAY OF MAGNESIUM: CPT | Performed by: EMERGENCY MEDICINE

## 2021-07-12 PROCEDURE — 80047 BASIC METABLC PNL IONIZED CA: CPT | Mod: 59

## 2021-07-12 PROCEDURE — 87046 STOOL CULTR AEROBIC BACT EA: CPT | Performed by: EMERGENCY MEDICINE

## 2021-07-12 PROCEDURE — 83605 ASSAY OF LACTIC ACID: CPT | Performed by: EMERGENCY MEDICINE

## 2021-07-12 PROCEDURE — 99220 PR INITIAL OBSERVATION CARE,LEVL III: CPT | Mod: ,,, | Performed by: PHYSICIAN ASSISTANT

## 2021-07-12 PROCEDURE — 25500020 PHARM REV CODE 255: Performed by: EMERGENCY MEDICINE

## 2021-07-12 PROCEDURE — 96372 THER/PROPH/DIAG INJ SC/IM: CPT | Mod: 59

## 2021-07-12 RX ORDER — IBUPROFEN 200 MG
24 TABLET ORAL
Status: DISCONTINUED | OUTPATIENT
Start: 2021-07-12 | End: 2021-07-14 | Stop reason: HOSPADM

## 2021-07-12 RX ORDER — TALC
6 POWDER (GRAM) TOPICAL NIGHTLY PRN
Status: DISCONTINUED | OUTPATIENT
Start: 2021-07-12 | End: 2021-07-14 | Stop reason: HOSPADM

## 2021-07-12 RX ORDER — ONDANSETRON 8 MG/1
8 TABLET, ORALLY DISINTEGRATING ORAL EVERY 8 HOURS PRN
Status: DISCONTINUED | OUTPATIENT
Start: 2021-07-12 | End: 2021-07-14 | Stop reason: HOSPADM

## 2021-07-12 RX ORDER — IPRATROPIUM BROMIDE AND ALBUTEROL SULFATE 2.5; .5 MG/3ML; MG/3ML
3 SOLUTION RESPIRATORY (INHALATION) EVERY 4 HOURS PRN
Status: DISCONTINUED | OUTPATIENT
Start: 2021-07-12 | End: 2021-07-14 | Stop reason: HOSPADM

## 2021-07-12 RX ORDER — IBUPROFEN 200 MG
16 TABLET ORAL
Status: DISCONTINUED | OUTPATIENT
Start: 2021-07-12 | End: 2021-07-14 | Stop reason: HOSPADM

## 2021-07-12 RX ORDER — METRONIDAZOLE 500 MG/100ML
500 INJECTION, SOLUTION INTRAVENOUS
Status: DISCONTINUED | OUTPATIENT
Start: 2021-07-12 | End: 2021-07-13

## 2021-07-12 RX ORDER — GLUCAGON 1 MG
1 KIT INJECTION
Status: DISCONTINUED | OUTPATIENT
Start: 2021-07-12 | End: 2021-07-14 | Stop reason: HOSPADM

## 2021-07-12 RX ORDER — ENOXAPARIN SODIUM 100 MG/ML
40 INJECTION SUBCUTANEOUS EVERY 24 HOURS
Status: DISCONTINUED | OUTPATIENT
Start: 2021-07-12 | End: 2021-07-14 | Stop reason: HOSPADM

## 2021-07-12 RX ORDER — ACETAMINOPHEN 325 MG/1
650 TABLET ORAL EVERY 4 HOURS PRN
Status: DISCONTINUED | OUTPATIENT
Start: 2021-07-12 | End: 2021-07-14 | Stop reason: HOSPADM

## 2021-07-12 RX ADMIN — METRONIDAZOLE 500 MG: 500 INJECTION, SOLUTION INTRAVENOUS at 09:07

## 2021-07-12 RX ADMIN — VANCOMYCIN HYDROCHLORIDE 125 MG: KIT at 09:07

## 2021-07-12 RX ADMIN — SODIUM CHLORIDE 1000 ML: 0.9 INJECTION, SOLUTION INTRAVENOUS at 01:07

## 2021-07-12 RX ADMIN — IOHEXOL 75 ML: 350 INJECTION, SOLUTION INTRAVENOUS at 04:07

## 2021-07-12 RX ADMIN — CEFTRIAXONE 2 G: 2 INJECTION, SOLUTION INTRAVENOUS at 09:07

## 2021-07-12 RX ADMIN — ENOXAPARIN SODIUM 40 MG: 40 INJECTION SUBCUTANEOUS at 09:07

## 2021-07-13 LAB
ALBUMIN SERPL BCP-MCNC: 2.8 G/DL (ref 3.5–5.2)
ALP SERPL-CCNC: 89 U/L (ref 55–135)
ALT SERPL W/O P-5'-P-CCNC: 25 U/L (ref 10–44)
ANION GAP SERPL CALC-SCNC: 9 MMOL/L (ref 8–16)
ANISOCYTOSIS BLD QL SMEAR: SLIGHT
AST SERPL-CCNC: 16 U/L (ref 10–40)
BASOPHILS # BLD AUTO: 0.05 K/UL (ref 0–0.2)
BASOPHILS NFR BLD: 0.5 % (ref 0–1.9)
BILIRUB DIRECT SERPL-MCNC: 0.4 MG/DL (ref 0.1–0.3)
BILIRUB SERPL-MCNC: 0.8 MG/DL (ref 0.1–1)
BUN SERPL-MCNC: 11 MG/DL (ref 8–23)
CALCIUM SERPL-MCNC: 7.9 MG/DL (ref 8.7–10.5)
CHLORIDE SERPL-SCNC: 108 MMOL/L (ref 95–110)
CHOLEST SERPL-MCNC: 162 MG/DL (ref 120–199)
CHOLEST/HDLC SERPL: 3.4 {RATIO} (ref 2–5)
CO2 SERPL-SCNC: 19 MMOL/L (ref 23–29)
CREAT SERPL-MCNC: 0.8 MG/DL (ref 0.5–1.4)
DIFFERENTIAL METHOD: ABNORMAL
EOSINOPHIL # BLD AUTO: 0.4 K/UL (ref 0–0.5)
EOSINOPHIL NFR BLD: 4.3 % (ref 0–8)
ERYTHROCYTE [DISTWIDTH] IN BLOOD BY AUTOMATED COUNT: 13.7 % (ref 11.5–14.5)
EST. GFR  (AFRICAN AMERICAN): >60 ML/MIN/1.73 M^2
EST. GFR  (NON AFRICAN AMERICAN): >60 ML/MIN/1.73 M^2
GGT SERPL-CCNC: 63 U/L (ref 8–55)
GLUCOSE SERPL-MCNC: 91 MG/DL (ref 70–110)
HCT VFR BLD AUTO: 28.4 % (ref 37–48.5)
HDLC SERPL-MCNC: 47 MG/DL (ref 40–75)
HDLC SERPL: 29 % (ref 20–50)
HGB BLD-MCNC: 9.6 G/DL (ref 12–16)
IMM GRANULOCYTES # BLD AUTO: 0.06 K/UL (ref 0–0.04)
IMM GRANULOCYTES NFR BLD AUTO: 0.7 % (ref 0–0.5)
LDLC SERPL CALC-MCNC: 97.4 MG/DL (ref 63–159)
LYMPHOCYTES # BLD AUTO: 1.5 K/UL (ref 1–4.8)
LYMPHOCYTES NFR BLD: 16.7 % (ref 18–48)
MAGNESIUM SERPL-MCNC: 2.2 MG/DL (ref 1.6–2.6)
MCH RBC QN AUTO: 30.2 PG (ref 27–31)
MCHC RBC AUTO-ENTMCNC: 33.8 G/DL (ref 32–36)
MCV RBC AUTO: 89 FL (ref 82–98)
MONOCYTES # BLD AUTO: 1.5 K/UL (ref 0.3–1)
MONOCYTES NFR BLD: 16.3 % (ref 4–15)
NEUTROPHILS # BLD AUTO: 5.6 K/UL (ref 1.8–7.7)
NEUTROPHILS NFR BLD: 61.5 % (ref 38–73)
NONHDLC SERPL-MCNC: 115 MG/DL
NRBC BLD-RTO: 0 /100 WBC
OVALOCYTES BLD QL SMEAR: ABNORMAL
PHOSPHATE SERPL-MCNC: 1.3 MG/DL (ref 2.7–4.5)
PLATELET # BLD AUTO: 138 K/UL (ref 150–450)
PLATELET BLD QL SMEAR: ABNORMAL
PMV BLD AUTO: 11 FL (ref 9.2–12.9)
POIKILOCYTOSIS BLD QL SMEAR: SLIGHT
POTASSIUM SERPL-SCNC: 3.3 MMOL/L (ref 3.5–5.1)
PROT SERPL-MCNC: 5.8 G/DL (ref 6–8.4)
RBC # BLD AUTO: 3.18 M/UL (ref 4–5.4)
SODIUM SERPL-SCNC: 136 MMOL/L (ref 136–145)
TRIGL SERPL-MCNC: 88 MG/DL (ref 30–150)
WBC # BLD AUTO: 9.12 K/UL (ref 3.9–12.7)

## 2021-07-13 PROCEDURE — 99225 PR SUBSEQUENT OBSERVATION CARE,LEVEL II: CPT | Mod: ,,, | Performed by: PHYSICIAN ASSISTANT

## 2021-07-13 PROCEDURE — 83735 ASSAY OF MAGNESIUM: CPT | Performed by: PHYSICIAN ASSISTANT

## 2021-07-13 PROCEDURE — 96376 TX/PRO/DX INJ SAME DRUG ADON: CPT

## 2021-07-13 PROCEDURE — 99900035 HC TECH TIME PER 15 MIN (STAT)

## 2021-07-13 PROCEDURE — 25000003 PHARM REV CODE 250: Performed by: PHYSICIAN ASSISTANT

## 2021-07-13 PROCEDURE — 99225 PR SUBSEQUENT OBSERVATION CARE,LEVEL II: ICD-10-PCS | Mod: ,,, | Performed by: PHYSICIAN ASSISTANT

## 2021-07-13 PROCEDURE — 36415 COLL VENOUS BLD VENIPUNCTURE: CPT | Performed by: PHYSICIAN ASSISTANT

## 2021-07-13 PROCEDURE — G0378 HOSPITAL OBSERVATION PER HR: HCPCS

## 2021-07-13 PROCEDURE — 94660 CPAP INITIATION&MGMT: CPT

## 2021-07-13 PROCEDURE — 82977 ASSAY OF GGT: CPT | Performed by: PHYSICIAN ASSISTANT

## 2021-07-13 PROCEDURE — 63600175 PHARM REV CODE 636 W HCPCS: Performed by: PHYSICIAN ASSISTANT

## 2021-07-13 PROCEDURE — S0030 INJECTION, METRONIDAZOLE: HCPCS

## 2021-07-13 PROCEDURE — 99214 OFFICE O/P EST MOD 30 MIN: CPT | Mod: GC,,, | Performed by: STUDENT IN AN ORGANIZED HEALTH CARE EDUCATION/TRAINING PROGRAM

## 2021-07-13 PROCEDURE — 82248 BILIRUBIN DIRECT: CPT | Performed by: PHYSICIAN ASSISTANT

## 2021-07-13 PROCEDURE — 96372 THER/PROPH/DIAG INJ SC/IM: CPT | Mod: 59

## 2021-07-13 PROCEDURE — 25000003 PHARM REV CODE 250

## 2021-07-13 PROCEDURE — 85025 COMPLETE CBC W/AUTO DIFF WBC: CPT | Performed by: PHYSICIAN ASSISTANT

## 2021-07-13 PROCEDURE — 80053 COMPREHEN METABOLIC PANEL: CPT | Performed by: PHYSICIAN ASSISTANT

## 2021-07-13 PROCEDURE — 99214 PR OFFICE/OUTPT VISIT, EST, LEVL IV, 30-39 MIN: ICD-10-PCS | Mod: GC,,, | Performed by: STUDENT IN AN ORGANIZED HEALTH CARE EDUCATION/TRAINING PROGRAM

## 2021-07-13 PROCEDURE — 80061 LIPID PANEL: CPT | Performed by: PHYSICIAN ASSISTANT

## 2021-07-13 PROCEDURE — 84100 ASSAY OF PHOSPHORUS: CPT | Performed by: PHYSICIAN ASSISTANT

## 2021-07-13 PROCEDURE — 94761 N-INVAS EAR/PLS OXIMETRY MLT: CPT

## 2021-07-13 PROCEDURE — 96361 HYDRATE IV INFUSION ADD-ON: CPT

## 2021-07-13 RX ORDER — SODIUM CHLORIDE, SODIUM LACTATE, POTASSIUM CHLORIDE, CALCIUM CHLORIDE 600; 310; 30; 20 MG/100ML; MG/100ML; MG/100ML; MG/100ML
INJECTION, SOLUTION INTRAVENOUS CONTINUOUS
Status: ACTIVE | OUTPATIENT
Start: 2021-07-13 | End: 2021-07-13

## 2021-07-13 RX ORDER — POTASSIUM CHLORIDE 20 MEQ/1
40 TABLET, EXTENDED RELEASE ORAL ONCE
Status: COMPLETED | OUTPATIENT
Start: 2021-07-13 | End: 2021-07-13

## 2021-07-13 RX ORDER — BUPROPION HYDROCHLORIDE 300 MG/1
300 TABLET ORAL DAILY
Status: DISCONTINUED | OUTPATIENT
Start: 2021-07-13 | End: 2021-07-14 | Stop reason: HOSPADM

## 2021-07-13 RX ORDER — PRAVASTATIN SODIUM 10 MG/1
20 TABLET ORAL DAILY
Status: DISCONTINUED | OUTPATIENT
Start: 2021-07-13 | End: 2021-07-14 | Stop reason: HOSPADM

## 2021-07-13 RX ORDER — METOPROLOL TARTRATE 25 MG/1
25 TABLET, FILM COATED ORAL 2 TIMES DAILY
Status: DISCONTINUED | OUTPATIENT
Start: 2021-07-13 | End: 2021-07-14 | Stop reason: HOSPADM

## 2021-07-13 RX ORDER — FLUOXETINE HYDROCHLORIDE 20 MG/1
40 CAPSULE ORAL DAILY
Status: DISCONTINUED | OUTPATIENT
Start: 2021-07-13 | End: 2021-07-14 | Stop reason: HOSPADM

## 2021-07-13 RX ADMIN — SODIUM CHLORIDE, SODIUM LACTATE, POTASSIUM CHLORIDE, AND CALCIUM CHLORIDE: 600; 310; 30; 20 INJECTION, SOLUTION INTRAVENOUS at 01:07

## 2021-07-13 RX ADMIN — PRAVASTATIN SODIUM 20 MG: 10 TABLET ORAL at 08:07

## 2021-07-13 RX ADMIN — VANCOMYCIN HYDROCHLORIDE 125 MG: KIT at 05:07

## 2021-07-13 RX ADMIN — FLUOXETINE 40 MG: 20 CAPSULE ORAL at 08:07

## 2021-07-13 RX ADMIN — ENOXAPARIN SODIUM 40 MG: 40 INJECTION SUBCUTANEOUS at 05:07

## 2021-07-13 RX ADMIN — ACETAMINOPHEN 650 MG: 325 TABLET ORAL at 08:07

## 2021-07-13 RX ADMIN — BUPROPION HYDROCHLORIDE 300 MG: 300 TABLET, FILM COATED, EXTENDED RELEASE ORAL at 08:07

## 2021-07-13 RX ADMIN — VANCOMYCIN HYDROCHLORIDE 125 MG: KIT at 11:07

## 2021-07-13 RX ADMIN — METOPROLOL TARTRATE 25 MG: 25 TABLET, FILM COATED ORAL at 09:07

## 2021-07-13 RX ADMIN — METRONIDAZOLE 500 MG: 500 INJECTION, SOLUTION INTRAVENOUS at 06:07

## 2021-07-13 RX ADMIN — VANCOMYCIN HYDROCHLORIDE 125 MG: KIT at 06:07

## 2021-07-13 RX ADMIN — POTASSIUM CHLORIDE 40 MEQ: 1500 TABLET, EXTENDED RELEASE ORAL at 01:07

## 2021-07-14 VITALS
RESPIRATION RATE: 18 BRPM | HEIGHT: 69 IN | HEART RATE: 61 BPM | DIASTOLIC BLOOD PRESSURE: 96 MMHG | OXYGEN SATURATION: 100 % | SYSTOLIC BLOOD PRESSURE: 123 MMHG | TEMPERATURE: 97 F | WEIGHT: 156 LBS | BODY MASS INDEX: 23.11 KG/M2

## 2021-07-14 LAB
ALBUMIN SERPL BCP-MCNC: 2.9 G/DL (ref 3.5–5.2)
ALP SERPL-CCNC: 71 U/L (ref 55–135)
ALT SERPL W/O P-5'-P-CCNC: 20 U/L (ref 10–44)
ANION GAP SERPL CALC-SCNC: 12 MMOL/L (ref 8–16)
AST SERPL-CCNC: 15 U/L (ref 10–40)
BASOPHILS # BLD AUTO: 0.04 K/UL (ref 0–0.2)
BASOPHILS NFR BLD: 0.6 % (ref 0–1.9)
BILIRUB SERPL-MCNC: 0.8 MG/DL (ref 0.1–1)
BUN SERPL-MCNC: 6 MG/DL (ref 8–23)
CALCIUM SERPL-MCNC: 8.1 MG/DL (ref 8.7–10.5)
CHLORIDE SERPL-SCNC: 109 MMOL/L (ref 95–110)
CO2 SERPL-SCNC: 18 MMOL/L (ref 23–29)
CREAT SERPL-MCNC: 0.8 MG/DL (ref 0.5–1.4)
DIFFERENTIAL METHOD: ABNORMAL
E COLI SXT1 STL QL IA: NEGATIVE
E COLI SXT2 STL QL IA: NEGATIVE
EOSINOPHIL # BLD AUTO: 0.5 K/UL (ref 0–0.5)
EOSINOPHIL NFR BLD: 7.2 % (ref 0–8)
ERYTHROCYTE [DISTWIDTH] IN BLOOD BY AUTOMATED COUNT: 14.1 % (ref 11.5–14.5)
EST. GFR  (AFRICAN AMERICAN): >60 ML/MIN/1.73 M^2
EST. GFR  (NON AFRICAN AMERICAN): >60 ML/MIN/1.73 M^2
GLUCOSE SERPL-MCNC: 86 MG/DL (ref 70–110)
HCT VFR BLD AUTO: 27.1 % (ref 37–48.5)
HGB BLD-MCNC: 9.2 G/DL (ref 12–16)
IMM GRANULOCYTES # BLD AUTO: 0.07 K/UL (ref 0–0.04)
IMM GRANULOCYTES NFR BLD AUTO: 1.1 % (ref 0–0.5)
LYMPHOCYTES # BLD AUTO: 1.2 K/UL (ref 1–4.8)
LYMPHOCYTES NFR BLD: 18.9 % (ref 18–48)
MAGNESIUM SERPL-MCNC: 2 MG/DL (ref 1.6–2.6)
MCH RBC QN AUTO: 29.6 PG (ref 27–31)
MCHC RBC AUTO-ENTMCNC: 33.9 G/DL (ref 32–36)
MCV RBC AUTO: 87 FL (ref 82–98)
MONOCYTES # BLD AUTO: 0.8 K/UL (ref 0.3–1)
MONOCYTES NFR BLD: 13.4 % (ref 4–15)
NEUTROPHILS # BLD AUTO: 3.7 K/UL (ref 1.8–7.7)
NEUTROPHILS NFR BLD: 58.8 % (ref 38–73)
NRBC BLD-RTO: 0 /100 WBC
PHOSPHATE SERPL-MCNC: 1.1 MG/DL (ref 2.7–4.5)
PLATELET # BLD AUTO: 148 K/UL (ref 150–450)
PMV BLD AUTO: 11 FL (ref 9.2–12.9)
POTASSIUM SERPL-SCNC: 3 MMOL/L (ref 3.5–5.1)
PROT SERPL-MCNC: 5.6 G/DL (ref 6–8.4)
RBC # BLD AUTO: 3.11 M/UL (ref 4–5.4)
SODIUM SERPL-SCNC: 139 MMOL/L (ref 136–145)
WBC # BLD AUTO: 6.25 K/UL (ref 3.9–12.7)

## 2021-07-14 PROCEDURE — 99217 PR OBSERVATION CARE DISCHARGE: CPT | Mod: ,,, | Performed by: PHYSICIAN ASSISTANT

## 2021-07-14 PROCEDURE — 25000003 PHARM REV CODE 250: Performed by: PHYSICIAN ASSISTANT

## 2021-07-14 PROCEDURE — 84100 ASSAY OF PHOSPHORUS: CPT | Performed by: PHYSICIAN ASSISTANT

## 2021-07-14 PROCEDURE — 25000003 PHARM REV CODE 250

## 2021-07-14 PROCEDURE — 96375 TX/PRO/DX INJ NEW DRUG ADDON: CPT

## 2021-07-14 PROCEDURE — 63600175 PHARM REV CODE 636 W HCPCS: Performed by: PHYSICIAN ASSISTANT

## 2021-07-14 PROCEDURE — 36415 COLL VENOUS BLD VENIPUNCTURE: CPT | Performed by: PHYSICIAN ASSISTANT

## 2021-07-14 PROCEDURE — 83735 ASSAY OF MAGNESIUM: CPT | Performed by: PHYSICIAN ASSISTANT

## 2021-07-14 PROCEDURE — 96376 TX/PRO/DX INJ SAME DRUG ADON: CPT

## 2021-07-14 PROCEDURE — 99217 PR OBSERVATION CARE DISCHARGE: ICD-10-PCS | Mod: ,,, | Performed by: PHYSICIAN ASSISTANT

## 2021-07-14 PROCEDURE — 85025 COMPLETE CBC W/AUTO DIFF WBC: CPT | Performed by: PHYSICIAN ASSISTANT

## 2021-07-14 PROCEDURE — 80053 COMPREHEN METABOLIC PANEL: CPT | Performed by: PHYSICIAN ASSISTANT

## 2021-07-14 PROCEDURE — G0378 HOSPITAL OBSERVATION PER HR: HCPCS

## 2021-07-14 PROCEDURE — 96361 HYDRATE IV INFUSION ADD-ON: CPT

## 2021-07-14 RX ORDER — POTASSIUM CHLORIDE 7.45 MG/ML
10 INJECTION INTRAVENOUS
Status: DISCONTINUED | OUTPATIENT
Start: 2021-07-14 | End: 2021-07-14

## 2021-07-14 RX ORDER — POTASSIUM CHLORIDE 20 MEQ/1
20 TABLET, EXTENDED RELEASE ORAL ONCE
Status: COMPLETED | OUTPATIENT
Start: 2021-07-14 | End: 2021-07-14

## 2021-07-14 RX ORDER — VANCOMYCIN HYDROCHLORIDE 125 MG/1
CAPSULE ORAL
Qty: 91 CAPSULE | Refills: 0 | Status: SHIPPED | OUTPATIENT
Start: 2021-07-14 | End: 2021-09-08

## 2021-07-14 RX ADMIN — POTASSIUM CHLORIDE 20 MEQ: 1500 TABLET, EXTENDED RELEASE ORAL at 12:07

## 2021-07-14 RX ADMIN — VANCOMYCIN HYDROCHLORIDE 125 MG: KIT at 06:07

## 2021-07-14 RX ADMIN — ACETAMINOPHEN 650 MG: 325 TABLET ORAL at 12:07

## 2021-07-14 RX ADMIN — FLUOXETINE 40 MG: 20 CAPSULE ORAL at 08:07

## 2021-07-14 RX ADMIN — POTASSIUM CHLORIDE 10 MEQ: 7.46 INJECTION, SOLUTION INTRAVENOUS at 08:07

## 2021-07-14 RX ADMIN — PRAVASTATIN SODIUM 20 MG: 10 TABLET ORAL at 08:07

## 2021-07-14 RX ADMIN — VANCOMYCIN HYDROCHLORIDE 125 MG: KIT at 12:07

## 2021-07-14 RX ADMIN — BUPROPION HYDROCHLORIDE 300 MG: 300 TABLET, FILM COATED, EXTENDED RELEASE ORAL at 08:07

## 2021-07-14 RX ADMIN — POTASSIUM CHLORIDE 10 MEQ: 7.46 INJECTION, SOLUTION INTRAVENOUS at 10:07

## 2021-07-14 RX ADMIN — METOPROLOL TARTRATE 25 MG: 25 TABLET, FILM COATED ORAL at 08:07

## 2021-07-16 LAB — BACTERIA STL CULT: NORMAL

## 2021-07-17 LAB
BACTERIA BLD CULT: NORMAL
BACTERIA BLD CULT: NORMAL

## 2021-07-27 ENCOUNTER — PATIENT MESSAGE (OUTPATIENT)
Dept: NEUROLOGY | Facility: CLINIC | Age: 71
End: 2021-07-27

## 2021-07-29 ENCOUNTER — LAB VISIT (OUTPATIENT)
Dept: LAB | Facility: OTHER | Age: 71
End: 2021-07-29
Attending: PSYCHIATRY & NEUROLOGY
Payer: MEDICARE

## 2021-07-29 ENCOUNTER — PROCEDURE VISIT (OUTPATIENT)
Dept: NEUROLOGY | Facility: CLINIC | Age: 71
End: 2021-07-29
Payer: MEDICARE

## 2021-07-29 DIAGNOSIS — G56.00 CARPAL TUNNEL SYNDROME, UNSPECIFIED LATERALITY: ICD-10-CM

## 2021-07-29 DIAGNOSIS — G60.3 IDIOPATHIC PROGRESSIVE POLYNEUROPATHY: Primary | ICD-10-CM

## 2021-07-29 DIAGNOSIS — G60.3 IDIOPATHIC PROGRESSIVE POLYNEUROPATHY: ICD-10-CM

## 2021-07-29 PROCEDURE — 95886 MUSC TEST DONE W/N TEST COMP: CPT | Mod: S$GLB,,, | Performed by: PSYCHIATRY & NEUROLOGY

## 2021-07-29 PROCEDURE — 95886 PR EMG COMPLETE, W/ NERVE CONDUCTION STUDIES, 5+ MUSCLES: ICD-10-PCS | Mod: S$GLB,,, | Performed by: PSYCHIATRY & NEUROLOGY

## 2021-07-29 PROCEDURE — 36415 COLL VENOUS BLD VENIPUNCTURE: CPT | Performed by: PSYCHIATRY & NEUROLOGY

## 2021-07-29 PROCEDURE — 95911 NRV CNDJ TEST 9-10 STUDIES: CPT | Mod: S$GLB,,, | Performed by: PSYCHIATRY & NEUROLOGY

## 2021-07-29 PROCEDURE — 95911 PR NERVE CONDUCTION STUDY; 9-10 STUDIES: ICD-10-PCS | Mod: S$GLB,,, | Performed by: PSYCHIATRY & NEUROLOGY

## 2021-07-30 LAB — MISCELLANEOUS TEST NAME: NORMAL

## 2021-08-03 ENCOUNTER — OFFICE VISIT (OUTPATIENT)
Dept: INFECTIOUS DISEASES | Facility: CLINIC | Age: 71
End: 2021-08-03
Payer: MEDICARE

## 2021-08-03 VITALS
HEIGHT: 69 IN | DIASTOLIC BLOOD PRESSURE: 72 MMHG | HEART RATE: 72 BPM | WEIGHT: 155.19 LBS | SYSTOLIC BLOOD PRESSURE: 123 MMHG | TEMPERATURE: 98 F | BODY MASS INDEX: 22.98 KG/M2

## 2021-08-03 DIAGNOSIS — A04.71 RECURRENT CLOSTRIDIOIDES DIFFICILE DIARRHEA: ICD-10-CM

## 2021-08-03 PROCEDURE — 99999 PR PBB SHADOW E&M-EST. PATIENT-LVL III: CPT | Mod: PBBFAC,GC,, | Performed by: STUDENT IN AN ORGANIZED HEALTH CARE EDUCATION/TRAINING PROGRAM

## 2021-08-03 PROCEDURE — 99999 PR PBB SHADOW E&M-EST. PATIENT-LVL III: ICD-10-PCS | Mod: PBBFAC,GC,, | Performed by: STUDENT IN AN ORGANIZED HEALTH CARE EDUCATION/TRAINING PROGRAM

## 2021-08-03 PROCEDURE — 99213 OFFICE O/P EST LOW 20 MIN: CPT | Mod: GC,S$GLB,, | Performed by: STUDENT IN AN ORGANIZED HEALTH CARE EDUCATION/TRAINING PROGRAM

## 2021-08-03 PROCEDURE — 99213 PR OFFICE/OUTPT VISIT, EST, LEVL III, 20-29 MIN: ICD-10-PCS | Mod: GC,S$GLB,, | Performed by: STUDENT IN AN ORGANIZED HEALTH CARE EDUCATION/TRAINING PROGRAM

## 2021-08-06 ENCOUNTER — TELEPHONE (OUTPATIENT)
Dept: ENDOCRINOLOGY | Facility: CLINIC | Age: 71
End: 2021-08-06

## 2021-08-06 DIAGNOSIS — M81.8 OTHER OSTEOPOROSIS WITHOUT CURRENT PATHOLOGICAL FRACTURE: Primary | ICD-10-CM

## 2021-08-10 ENCOUNTER — INFUSION (OUTPATIENT)
Dept: INFECTIOUS DISEASES | Facility: HOSPITAL | Age: 71
End: 2021-08-10
Attending: INTERNAL MEDICINE
Payer: MEDICARE

## 2021-08-10 VITALS
RESPIRATION RATE: 17 BRPM | OXYGEN SATURATION: 98 % | HEIGHT: 68 IN | DIASTOLIC BLOOD PRESSURE: 73 MMHG | TEMPERATURE: 98 F | WEIGHT: 151 LBS | SYSTOLIC BLOOD PRESSURE: 136 MMHG | BODY MASS INDEX: 22.88 KG/M2 | HEART RATE: 66 BPM

## 2021-08-10 DIAGNOSIS — N18.30 STAGE 3 CHRONIC KIDNEY DISEASE, UNSPECIFIED WHETHER STAGE 3A OR 3B CKD: ICD-10-CM

## 2021-08-10 DIAGNOSIS — M81.8 OTHER OSTEOPOROSIS WITHOUT CURRENT PATHOLOGICAL FRACTURE: Primary | ICD-10-CM

## 2021-08-10 PROCEDURE — 63600175 PHARM REV CODE 636 W HCPCS: Mod: JG | Performed by: INTERNAL MEDICINE

## 2021-08-10 PROCEDURE — 96372 THER/PROPH/DIAG INJ SC/IM: CPT

## 2021-08-10 RX ADMIN — DENOSUMAB 60 MG: 60 INJECTION SUBCUTANEOUS at 11:08

## 2021-08-25 ENCOUNTER — OFFICE VISIT (OUTPATIENT)
Dept: NEUROLOGY | Facility: CLINIC | Age: 71
End: 2021-08-25
Payer: MEDICARE

## 2021-08-25 ENCOUNTER — LAB VISIT (OUTPATIENT)
Dept: LAB | Facility: OTHER | Age: 71
End: 2021-08-25
Attending: PSYCHIATRY & NEUROLOGY
Payer: MEDICARE

## 2021-08-25 VITALS
HEIGHT: 68 IN | WEIGHT: 149.94 LBS | SYSTOLIC BLOOD PRESSURE: 132 MMHG | HEART RATE: 71 BPM | BODY MASS INDEX: 22.72 KG/M2 | DIASTOLIC BLOOD PRESSURE: 61 MMHG

## 2021-08-25 DIAGNOSIS — G60.3 IDIOPATHIC PROGRESSIVE POLYNEUROPATHY: ICD-10-CM

## 2021-08-25 DIAGNOSIS — G60.3 IDIOPATHIC PROGRESSIVE POLYNEUROPATHY: Primary | ICD-10-CM

## 2021-08-25 DIAGNOSIS — N39.41 URGE INCONTINENCE OF URINE: ICD-10-CM

## 2021-08-25 PROCEDURE — 3044F HG A1C LEVEL LT 7.0%: CPT | Mod: CPTII,S$GLB,, | Performed by: PSYCHIATRY & NEUROLOGY

## 2021-08-25 PROCEDURE — 1159F PR MEDICATION LIST DOCUMENTED IN MEDICAL RECORD: ICD-10-PCS | Mod: CPTII,S$GLB,, | Performed by: PSYCHIATRY & NEUROLOGY

## 2021-08-25 PROCEDURE — 1101F PT FALLS ASSESS-DOCD LE1/YR: CPT | Mod: CPTII,S$GLB,, | Performed by: PSYCHIATRY & NEUROLOGY

## 2021-08-25 PROCEDURE — 3078F DIAST BP <80 MM HG: CPT | Mod: CPTII,S$GLB,, | Performed by: PSYCHIATRY & NEUROLOGY

## 2021-08-25 PROCEDURE — 83520 IMMUNOASSAY QUANT NOS NONAB: CPT | Performed by: PSYCHIATRY & NEUROLOGY

## 2021-08-25 PROCEDURE — 1157F PR ADVANCE CARE PLAN OR EQUIV PRESENT IN MEDICAL RECORD: ICD-10-PCS | Mod: CPTII,S$GLB,, | Performed by: PSYCHIATRY & NEUROLOGY

## 2021-08-25 PROCEDURE — 1126F AMNT PAIN NOTED NONE PRSNT: CPT | Mod: CPTII,S$GLB,, | Performed by: PSYCHIATRY & NEUROLOGY

## 2021-08-25 PROCEDURE — 3078F PR MOST RECENT DIASTOLIC BLOOD PRESSURE < 80 MM HG: ICD-10-PCS | Mod: CPTII,S$GLB,, | Performed by: PSYCHIATRY & NEUROLOGY

## 2021-08-25 PROCEDURE — 3075F SYST BP GE 130 - 139MM HG: CPT | Mod: CPTII,S$GLB,, | Performed by: PSYCHIATRY & NEUROLOGY

## 2021-08-25 PROCEDURE — 1126F PR PAIN SEVERITY QUANTIFIED, NO PAIN PRESENT: ICD-10-PCS | Mod: CPTII,S$GLB,, | Performed by: PSYCHIATRY & NEUROLOGY

## 2021-08-25 PROCEDURE — 1157F ADVNC CARE PLAN IN RCRD: CPT | Mod: CPTII,S$GLB,, | Performed by: PSYCHIATRY & NEUROLOGY

## 2021-08-25 PROCEDURE — 99215 OFFICE O/P EST HI 40 MIN: CPT | Mod: S$GLB,,, | Performed by: PSYCHIATRY & NEUROLOGY

## 2021-08-25 PROCEDURE — 3288F FALL RISK ASSESSMENT DOCD: CPT | Mod: CPTII,S$GLB,, | Performed by: PSYCHIATRY & NEUROLOGY

## 2021-08-25 PROCEDURE — 4010F PR ACE/ARB THEARPY RXD/TAKEN: ICD-10-PCS | Mod: CPTII,S$GLB,, | Performed by: PSYCHIATRY & NEUROLOGY

## 2021-08-25 PROCEDURE — 99999 PR PBB SHADOW E&M-EST. PATIENT-LVL IV: ICD-10-PCS | Mod: PBBFAC,,, | Performed by: PSYCHIATRY & NEUROLOGY

## 2021-08-25 PROCEDURE — 3288F PR FALLS RISK ASSESSMENT DOCUMENTED: ICD-10-PCS | Mod: CPTII,S$GLB,, | Performed by: PSYCHIATRY & NEUROLOGY

## 2021-08-25 PROCEDURE — 4010F ACE/ARB THERAPY RXD/TAKEN: CPT | Mod: CPTII,S$GLB,, | Performed by: PSYCHIATRY & NEUROLOGY

## 2021-08-25 PROCEDURE — 3044F PR MOST RECENT HEMOGLOBIN A1C LEVEL <7.0%: ICD-10-PCS | Mod: CPTII,S$GLB,, | Performed by: PSYCHIATRY & NEUROLOGY

## 2021-08-25 PROCEDURE — 1101F PR PT FALLS ASSESS DOC 0-1 FALLS W/OUT INJ PAST YR: ICD-10-PCS | Mod: CPTII,S$GLB,, | Performed by: PSYCHIATRY & NEUROLOGY

## 2021-08-25 PROCEDURE — 99999 PR PBB SHADOW E&M-EST. PATIENT-LVL IV: CPT | Mod: PBBFAC,,, | Performed by: PSYCHIATRY & NEUROLOGY

## 2021-08-25 PROCEDURE — 3075F PR MOST RECENT SYSTOLIC BLOOD PRESS GE 130-139MM HG: ICD-10-PCS | Mod: CPTII,S$GLB,, | Performed by: PSYCHIATRY & NEUROLOGY

## 2021-08-25 PROCEDURE — 99499 UNLISTED E&M SERVICE: CPT | Mod: HCNC,S$GLB,, | Performed by: PSYCHIATRY & NEUROLOGY

## 2021-08-25 PROCEDURE — 3008F PR BODY MASS INDEX (BMI) DOCUMENTED: ICD-10-PCS | Mod: CPTII,S$GLB,, | Performed by: PSYCHIATRY & NEUROLOGY

## 2021-08-25 PROCEDURE — 99499 RISK ADDL DX/OHS AUDIT: ICD-10-PCS | Mod: HCNC,S$GLB,, | Performed by: PSYCHIATRY & NEUROLOGY

## 2021-08-25 PROCEDURE — 99215 PR OFFICE/OUTPT VISIT, EST, LEVL V, 40-54 MIN: ICD-10-PCS | Mod: S$GLB,,, | Performed by: PSYCHIATRY & NEUROLOGY

## 2021-08-25 PROCEDURE — 3008F BODY MASS INDEX DOCD: CPT | Mod: CPTII,S$GLB,, | Performed by: PSYCHIATRY & NEUROLOGY

## 2021-08-25 PROCEDURE — 1159F MED LIST DOCD IN RCRD: CPT | Mod: CPTII,S$GLB,, | Performed by: PSYCHIATRY & NEUROLOGY

## 2021-08-25 PROCEDURE — 36415 COLL VENOUS BLD VENIPUNCTURE: CPT | Performed by: PSYCHIATRY & NEUROLOGY

## 2021-08-26 ENCOUNTER — TELEPHONE (OUTPATIENT)
Dept: ADMINISTRATIVE | Facility: OTHER | Age: 71
End: 2021-08-26

## 2021-08-26 LAB
KAPPA LC SER QL IA: 3.44 MG/DL (ref 0.33–1.94)
KAPPA LC/LAMBDA SER IA: 1.78 (ref 0.26–1.65)
LAMBDA LC SER QL IA: 1.93 MG/DL (ref 0.57–2.63)

## 2021-08-27 ENCOUNTER — TELEPHONE (OUTPATIENT)
Dept: ENDOCRINOLOGY | Facility: CLINIC | Age: 71
End: 2021-08-27

## 2021-08-27 ENCOUNTER — TELEPHONE (OUTPATIENT)
Dept: CARDIOLOGY | Facility: HOSPITAL | Age: 71
End: 2021-08-27

## 2021-08-27 ENCOUNTER — PATIENT MESSAGE (OUTPATIENT)
Dept: ENDOCRINOLOGY | Facility: CLINIC | Age: 71
End: 2021-08-27

## 2021-09-03 ENCOUNTER — PATIENT MESSAGE (OUTPATIENT)
Dept: NEUROSURGERY | Facility: CLINIC | Age: 71
End: 2021-09-03

## 2021-09-07 ENCOUNTER — PATIENT MESSAGE (OUTPATIENT)
Dept: NEUROLOGY | Facility: CLINIC | Age: 71
End: 2021-09-07

## 2021-09-13 ENCOUNTER — TELEPHONE (OUTPATIENT)
Dept: INTERNAL MEDICINE | Facility: CLINIC | Age: 71
End: 2021-09-13

## 2021-09-13 ENCOUNTER — PATIENT MESSAGE (OUTPATIENT)
Dept: INTERNAL MEDICINE | Facility: CLINIC | Age: 71
End: 2021-09-13

## 2021-09-13 DIAGNOSIS — F98.8 ATTENTION DEFICIT DISORDER, UNSPECIFIED HYPERACTIVITY PRESENCE: Primary | ICD-10-CM

## 2021-09-13 RX ORDER — LISDEXAMFETAMINE DIMESYLATE 70 MG/1
70 CAPSULE ORAL EVERY MORNING
Qty: 30 CAPSULE | Refills: 0 | Status: ON HOLD | OUTPATIENT
Start: 2021-09-13 | End: 2022-11-23 | Stop reason: HOSPADM

## 2021-09-13 RX ORDER — LISDEXAMFETAMINE DIMESYLATE 70 MG/1
70 CAPSULE ORAL EVERY MORNING
Qty: 30 CAPSULE | Refills: 0 | Status: CANCELLED | OUTPATIENT
Start: 2021-09-13

## 2021-09-20 ENCOUNTER — OFFICE VISIT (OUTPATIENT)
Dept: PODIATRY | Facility: CLINIC | Age: 71
End: 2021-09-20
Payer: MEDICARE

## 2021-09-20 VITALS
SYSTOLIC BLOOD PRESSURE: 140 MMHG | DIASTOLIC BLOOD PRESSURE: 70 MMHG | HEIGHT: 68 IN | BODY MASS INDEX: 22.79 KG/M2 | HEART RATE: 65 BPM

## 2021-09-20 DIAGNOSIS — L60.9 DISEASE OF NAIL: ICD-10-CM

## 2021-09-20 DIAGNOSIS — M20.41 HAMMER TOES OF BOTH FEET: ICD-10-CM

## 2021-09-20 DIAGNOSIS — M20.42 HAMMER TOES OF BOTH FEET: ICD-10-CM

## 2021-09-20 DIAGNOSIS — L60.8 TOENAIL DEFORMITY: ICD-10-CM

## 2021-09-20 DIAGNOSIS — G60.8 SENSORY PERIPHERAL NEUROPATHY: Primary | ICD-10-CM

## 2021-09-20 PROCEDURE — 3077F SYST BP >= 140 MM HG: CPT | Mod: HCNC,CPTII,S$GLB, | Performed by: PODIATRIST

## 2021-09-20 PROCEDURE — 1157F PR ADVANCE CARE PLAN OR EQUIV PRESENT IN MEDICAL RECORD: ICD-10-PCS | Mod: HCNC,CPTII,S$GLB, | Performed by: PODIATRIST

## 2021-09-20 PROCEDURE — 3044F PR MOST RECENT HEMOGLOBIN A1C LEVEL <7.0%: ICD-10-PCS | Mod: HCNC,CPTII,S$GLB, | Performed by: PODIATRIST

## 2021-09-20 PROCEDURE — 11721 PR DEBRIDEMENT OF NAILS, 6 OR MORE: ICD-10-PCS | Mod: Q9,HCNC,S$GLB, | Performed by: PODIATRIST

## 2021-09-20 PROCEDURE — 3044F HG A1C LEVEL LT 7.0%: CPT | Mod: HCNC,CPTII,S$GLB, | Performed by: PODIATRIST

## 2021-09-20 PROCEDURE — 1157F ADVNC CARE PLAN IN RCRD: CPT | Mod: HCNC,CPTII,S$GLB, | Performed by: PODIATRIST

## 2021-09-20 PROCEDURE — 4010F PR ACE/ARB THEARPY RXD/TAKEN: ICD-10-PCS | Mod: HCNC,CPTII,S$GLB, | Performed by: PODIATRIST

## 2021-09-20 PROCEDURE — 1125F PR PAIN SEVERITY QUANTIFIED, PAIN PRESENT: ICD-10-PCS | Mod: HCNC,CPTII,S$GLB, | Performed by: PODIATRIST

## 2021-09-20 PROCEDURE — 99213 PR OFFICE/OUTPT VISIT, EST, LEVL III, 20-29 MIN: ICD-10-PCS | Mod: 25,HCNC,S$GLB, | Performed by: PODIATRIST

## 2021-09-20 PROCEDURE — 99999 PR PBB SHADOW E&M-EST. PATIENT-LVL III: ICD-10-PCS | Mod: PBBFAC,HCNC,, | Performed by: PODIATRIST

## 2021-09-20 PROCEDURE — 3077F PR MOST RECENT SYSTOLIC BLOOD PRESSURE >= 140 MM HG: ICD-10-PCS | Mod: HCNC,CPTII,S$GLB, | Performed by: PODIATRIST

## 2021-09-20 PROCEDURE — 3078F PR MOST RECENT DIASTOLIC BLOOD PRESSURE < 80 MM HG: ICD-10-PCS | Mod: HCNC,CPTII,S$GLB, | Performed by: PODIATRIST

## 2021-09-20 PROCEDURE — 1159F PR MEDICATION LIST DOCUMENTED IN MEDICAL RECORD: ICD-10-PCS | Mod: HCNC,CPTII,S$GLB, | Performed by: PODIATRIST

## 2021-09-20 PROCEDURE — 99999 PR PBB SHADOW E&M-EST. PATIENT-LVL III: CPT | Mod: PBBFAC,HCNC,, | Performed by: PODIATRIST

## 2021-09-20 PROCEDURE — 99213 OFFICE O/P EST LOW 20 MIN: CPT | Mod: 25,HCNC,S$GLB, | Performed by: PODIATRIST

## 2021-09-20 PROCEDURE — 11721 DEBRIDE NAIL 6 OR MORE: CPT | Mod: Q9,HCNC,S$GLB, | Performed by: PODIATRIST

## 2021-09-20 PROCEDURE — 1159F MED LIST DOCD IN RCRD: CPT | Mod: HCNC,CPTII,S$GLB, | Performed by: PODIATRIST

## 2021-09-20 PROCEDURE — 3078F DIAST BP <80 MM HG: CPT | Mod: HCNC,CPTII,S$GLB, | Performed by: PODIATRIST

## 2021-09-20 PROCEDURE — 3008F PR BODY MASS INDEX (BMI) DOCUMENTED: ICD-10-PCS | Mod: HCNC,CPTII,S$GLB, | Performed by: PODIATRIST

## 2021-09-20 PROCEDURE — 4010F ACE/ARB THERAPY RXD/TAKEN: CPT | Mod: HCNC,CPTII,S$GLB, | Performed by: PODIATRIST

## 2021-09-20 PROCEDURE — 1125F AMNT PAIN NOTED PAIN PRSNT: CPT | Mod: HCNC,CPTII,S$GLB, | Performed by: PODIATRIST

## 2021-09-20 PROCEDURE — 3008F BODY MASS INDEX DOCD: CPT | Mod: HCNC,CPTII,S$GLB, | Performed by: PODIATRIST

## 2021-09-21 DIAGNOSIS — D89.89 LIGHT CHAIN DISEASE, KAPPA TYPE: Primary | ICD-10-CM

## 2021-09-28 ENCOUNTER — TELEPHONE (OUTPATIENT)
Dept: INFECTIOUS DISEASES | Facility: CLINIC | Age: 71
End: 2021-09-28

## 2021-09-28 ENCOUNTER — OFFICE VISIT (OUTPATIENT)
Dept: SURGERY | Facility: CLINIC | Age: 71
End: 2021-09-28
Payer: MEDICARE

## 2021-09-28 ENCOUNTER — TELEPHONE (OUTPATIENT)
Dept: SURGERY | Facility: CLINIC | Age: 71
End: 2021-09-28

## 2021-09-28 VITALS
DIASTOLIC BLOOD PRESSURE: 75 MMHG | HEART RATE: 87 BPM | WEIGHT: 145.06 LBS | SYSTOLIC BLOOD PRESSURE: 135 MMHG | HEIGHT: 68 IN | BODY MASS INDEX: 21.99 KG/M2

## 2021-09-28 DIAGNOSIS — R19.7 DIARRHEA OF PRESUMED INFECTIOUS ORIGIN: Primary | ICD-10-CM

## 2021-09-28 PROCEDURE — 1160F PR REVIEW ALL MEDS BY PRESCRIBER/CLIN PHARMACIST DOCUMENTED: ICD-10-PCS | Mod: HCNC,CPTII,S$GLB, | Performed by: NURSE PRACTITIONER

## 2021-09-28 PROCEDURE — 4010F ACE/ARB THERAPY RXD/TAKEN: CPT | Mod: HCNC,CPTII,S$GLB, | Performed by: NURSE PRACTITIONER

## 2021-09-28 PROCEDURE — 1159F MED LIST DOCD IN RCRD: CPT | Mod: HCNC,CPTII,S$GLB, | Performed by: NURSE PRACTITIONER

## 2021-09-28 PROCEDURE — 1157F ADVNC CARE PLAN IN RCRD: CPT | Mod: HCNC,CPTII,S$GLB, | Performed by: NURSE PRACTITIONER

## 2021-09-28 PROCEDURE — 1101F PT FALLS ASSESS-DOCD LE1/YR: CPT | Mod: HCNC,CPTII,S$GLB, | Performed by: NURSE PRACTITIONER

## 2021-09-28 PROCEDURE — 4010F PR ACE/ARB THEARPY RXD/TAKEN: ICD-10-PCS | Mod: HCNC,CPTII,S$GLB, | Performed by: NURSE PRACTITIONER

## 2021-09-28 PROCEDURE — 3078F PR MOST RECENT DIASTOLIC BLOOD PRESSURE < 80 MM HG: ICD-10-PCS | Mod: HCNC,CPTII,S$GLB, | Performed by: NURSE PRACTITIONER

## 2021-09-28 PROCEDURE — 1159F PR MEDICATION LIST DOCUMENTED IN MEDICAL RECORD: ICD-10-PCS | Mod: HCNC,CPTII,S$GLB, | Performed by: NURSE PRACTITIONER

## 2021-09-28 PROCEDURE — 3075F SYST BP GE 130 - 139MM HG: CPT | Mod: HCNC,CPTII,S$GLB, | Performed by: NURSE PRACTITIONER

## 2021-09-28 PROCEDURE — 99204 PR OFFICE/OUTPT VISIT, NEW, LEVL IV, 45-59 MIN: ICD-10-PCS | Mod: HCNC,S$GLB,, | Performed by: NURSE PRACTITIONER

## 2021-09-28 PROCEDURE — 1126F AMNT PAIN NOTED NONE PRSNT: CPT | Mod: HCNC,CPTII,S$GLB, | Performed by: NURSE PRACTITIONER

## 2021-09-28 PROCEDURE — 3044F PR MOST RECENT HEMOGLOBIN A1C LEVEL <7.0%: ICD-10-PCS | Mod: HCNC,CPTII,S$GLB, | Performed by: NURSE PRACTITIONER

## 2021-09-28 PROCEDURE — 3008F BODY MASS INDEX DOCD: CPT | Mod: HCNC,CPTII,S$GLB, | Performed by: NURSE PRACTITIONER

## 2021-09-28 PROCEDURE — 99999 PR PBB SHADOW E&M-EST. PATIENT-LVL III: ICD-10-PCS | Mod: PBBFAC,HCNC,, | Performed by: NURSE PRACTITIONER

## 2021-09-28 PROCEDURE — 99204 OFFICE O/P NEW MOD 45 MIN: CPT | Mod: HCNC,S$GLB,, | Performed by: NURSE PRACTITIONER

## 2021-09-28 PROCEDURE — 3288F FALL RISK ASSESSMENT DOCD: CPT | Mod: HCNC,CPTII,S$GLB, | Performed by: NURSE PRACTITIONER

## 2021-09-28 PROCEDURE — 1160F RVW MEDS BY RX/DR IN RCRD: CPT | Mod: HCNC,CPTII,S$GLB, | Performed by: NURSE PRACTITIONER

## 2021-09-28 PROCEDURE — 99999 PR PBB SHADOW E&M-EST. PATIENT-LVL III: CPT | Mod: PBBFAC,HCNC,, | Performed by: NURSE PRACTITIONER

## 2021-09-28 PROCEDURE — 3288F PR FALLS RISK ASSESSMENT DOCUMENTED: ICD-10-PCS | Mod: HCNC,CPTII,S$GLB, | Performed by: NURSE PRACTITIONER

## 2021-09-28 PROCEDURE — 1126F PR PAIN SEVERITY QUANTIFIED, NO PAIN PRESENT: ICD-10-PCS | Mod: HCNC,CPTII,S$GLB, | Performed by: NURSE PRACTITIONER

## 2021-09-28 PROCEDURE — 3078F DIAST BP <80 MM HG: CPT | Mod: HCNC,CPTII,S$GLB, | Performed by: NURSE PRACTITIONER

## 2021-09-28 PROCEDURE — 3008F PR BODY MASS INDEX (BMI) DOCUMENTED: ICD-10-PCS | Mod: HCNC,CPTII,S$GLB, | Performed by: NURSE PRACTITIONER

## 2021-09-28 PROCEDURE — 3044F HG A1C LEVEL LT 7.0%: CPT | Mod: HCNC,CPTII,S$GLB, | Performed by: NURSE PRACTITIONER

## 2021-09-28 PROCEDURE — 1101F PR PT FALLS ASSESS DOC 0-1 FALLS W/OUT INJ PAST YR: ICD-10-PCS | Mod: HCNC,CPTII,S$GLB, | Performed by: NURSE PRACTITIONER

## 2021-09-28 PROCEDURE — 3075F PR MOST RECENT SYSTOLIC BLOOD PRESS GE 130-139MM HG: ICD-10-PCS | Mod: HCNC,CPTII,S$GLB, | Performed by: NURSE PRACTITIONER

## 2021-09-28 PROCEDURE — 1157F PR ADVANCE CARE PLAN OR EQUIV PRESENT IN MEDICAL RECORD: ICD-10-PCS | Mod: HCNC,CPTII,S$GLB, | Performed by: NURSE PRACTITIONER

## 2021-09-28 RX ORDER — DICYCLOMINE HYDROCHLORIDE 10 MG/1
10 CAPSULE ORAL
Qty: 120 CAPSULE | Refills: 0 | Status: SHIPPED | OUTPATIENT
Start: 2021-09-28 | End: 2021-10-28

## 2021-09-30 ENCOUNTER — LAB VISIT (OUTPATIENT)
Dept: LAB | Facility: HOSPITAL | Age: 71
End: 2021-09-30
Payer: MEDICARE

## 2021-09-30 ENCOUNTER — OFFICE VISIT (OUTPATIENT)
Dept: INFECTIOUS DISEASES | Facility: CLINIC | Age: 71
End: 2021-09-30
Payer: MEDICARE

## 2021-09-30 VITALS
HEART RATE: 69 BPM | WEIGHT: 146.38 LBS | BODY MASS INDEX: 22.19 KG/M2 | DIASTOLIC BLOOD PRESSURE: 78 MMHG | HEIGHT: 68 IN | SYSTOLIC BLOOD PRESSURE: 114 MMHG | TEMPERATURE: 98 F

## 2021-09-30 DIAGNOSIS — Z86.19 HISTORY OF CLOSTRIDIOIDES DIFFICILE COLITIS: ICD-10-CM

## 2021-09-30 DIAGNOSIS — Z86.19 HISTORY OF CLOSTRIDIOIDES DIFFICILE COLITIS: Primary | ICD-10-CM

## 2021-09-30 DIAGNOSIS — A04.71 RECURRENT CLOSTRIDIOIDES DIFFICILE DIARRHEA: ICD-10-CM

## 2021-09-30 LAB
ALBUMIN SERPL BCP-MCNC: 3.7 G/DL (ref 3.5–5.2)
ALP SERPL-CCNC: 91 U/L (ref 55–135)
ALT SERPL W/O P-5'-P-CCNC: 40 U/L (ref 10–44)
ANION GAP SERPL CALC-SCNC: 10 MMOL/L (ref 8–16)
AST SERPL-CCNC: 20 U/L (ref 10–40)
BASOPHILS # BLD AUTO: 0.05 K/UL (ref 0–0.2)
BASOPHILS NFR BLD: 0.8 % (ref 0–1.9)
BILIRUB SERPL-MCNC: 0.6 MG/DL (ref 0.1–1)
BUN SERPL-MCNC: 15 MG/DL (ref 8–23)
CALCIUM SERPL-MCNC: 9.7 MG/DL (ref 8.7–10.5)
CHLORIDE SERPL-SCNC: 101 MMOL/L (ref 95–110)
CO2 SERPL-SCNC: 23 MMOL/L (ref 23–29)
CREAT SERPL-MCNC: 1 MG/DL (ref 0.5–1.4)
DIFFERENTIAL METHOD: ABNORMAL
EOSINOPHIL # BLD AUTO: 0.3 K/UL (ref 0–0.5)
EOSINOPHIL NFR BLD: 4.1 % (ref 0–8)
ERYTHROCYTE [DISTWIDTH] IN BLOOD BY AUTOMATED COUNT: 13.2 % (ref 11.5–14.5)
EST. GFR  (AFRICAN AMERICAN): >60 ML/MIN/1.73 M^2
EST. GFR  (NON AFRICAN AMERICAN): 56 ML/MIN/1.73 M^2
GLUCOSE SERPL-MCNC: 90 MG/DL (ref 70–110)
HCT VFR BLD AUTO: 38.9 % (ref 37–48.5)
HGB BLD-MCNC: 12.7 G/DL (ref 12–16)
IMM GRANULOCYTES # BLD AUTO: 0.05 K/UL (ref 0–0.04)
IMM GRANULOCYTES NFR BLD AUTO: 0.8 % (ref 0–0.5)
LYMPHOCYTES # BLD AUTO: 2.1 K/UL (ref 1–4.8)
LYMPHOCYTES NFR BLD: 32 % (ref 18–48)
MCH RBC QN AUTO: 29.3 PG (ref 27–31)
MCHC RBC AUTO-ENTMCNC: 32.6 G/DL (ref 32–36)
MCV RBC AUTO: 90 FL (ref 82–98)
MONOCYTES # BLD AUTO: 0.7 K/UL (ref 0.3–1)
MONOCYTES NFR BLD: 10.2 % (ref 4–15)
NEUTROPHILS # BLD AUTO: 3.4 K/UL (ref 1.8–7.7)
NEUTROPHILS NFR BLD: 52.1 % (ref 38–73)
NRBC BLD-RTO: 0 /100 WBC
PLATELET # BLD AUTO: 204 K/UL (ref 150–450)
PMV BLD AUTO: 10.9 FL (ref 9.2–12.9)
POTASSIUM SERPL-SCNC: 4.3 MMOL/L (ref 3.5–5.1)
PROT SERPL-MCNC: 7.1 G/DL (ref 6–8.4)
RBC # BLD AUTO: 4.33 M/UL (ref 4–5.4)
SODIUM SERPL-SCNC: 134 MMOL/L (ref 136–145)
WBC # BLD AUTO: 6.59 K/UL (ref 3.9–12.7)

## 2021-09-30 PROCEDURE — 99214 PR OFFICE/OUTPT VISIT, EST, LEVL IV, 30-39 MIN: ICD-10-PCS | Mod: HCNC,GC,S$GLB, | Performed by: STUDENT IN AN ORGANIZED HEALTH CARE EDUCATION/TRAINING PROGRAM

## 2021-09-30 PROCEDURE — 99999 PR PBB SHADOW E&M-EST. PATIENT-LVL IV: CPT | Mod: PBBFAC,HCNC,GC, | Performed by: STUDENT IN AN ORGANIZED HEALTH CARE EDUCATION/TRAINING PROGRAM

## 2021-09-30 PROCEDURE — 85025 COMPLETE CBC W/AUTO DIFF WBC: CPT | Mod: HCNC | Performed by: STUDENT IN AN ORGANIZED HEALTH CARE EDUCATION/TRAINING PROGRAM

## 2021-09-30 PROCEDURE — 80053 COMPREHEN METABOLIC PANEL: CPT | Mod: HCNC | Performed by: STUDENT IN AN ORGANIZED HEALTH CARE EDUCATION/TRAINING PROGRAM

## 2021-09-30 PROCEDURE — 36415 COLL VENOUS BLD VENIPUNCTURE: CPT | Mod: HCNC | Performed by: STUDENT IN AN ORGANIZED HEALTH CARE EDUCATION/TRAINING PROGRAM

## 2021-09-30 PROCEDURE — 99999 PR PBB SHADOW E&M-EST. PATIENT-LVL IV: ICD-10-PCS | Mod: PBBFAC,HCNC,GC, | Performed by: STUDENT IN AN ORGANIZED HEALTH CARE EDUCATION/TRAINING PROGRAM

## 2021-09-30 PROCEDURE — 99214 OFFICE O/P EST MOD 30 MIN: CPT | Mod: HCNC,GC,S$GLB, | Performed by: STUDENT IN AN ORGANIZED HEALTH CARE EDUCATION/TRAINING PROGRAM

## 2021-10-01 ENCOUNTER — LAB VISIT (OUTPATIENT)
Dept: LAB | Facility: HOSPITAL | Age: 71
End: 2021-10-01
Payer: MEDICARE

## 2021-10-01 DIAGNOSIS — Z86.19 HISTORY OF CLOSTRIDIOIDES DIFFICILE COLITIS: ICD-10-CM

## 2021-10-01 PROCEDURE — 87324 CLOSTRIDIUM AG IA: CPT | Mod: HCNC | Performed by: STUDENT IN AN ORGANIZED HEALTH CARE EDUCATION/TRAINING PROGRAM

## 2021-10-01 PROCEDURE — 87449 NOS EACH ORGANISM AG IA: CPT | Mod: HCNC | Performed by: STUDENT IN AN ORGANIZED HEALTH CARE EDUCATION/TRAINING PROGRAM

## 2021-10-02 LAB
C DIFF GDH STL QL: POSITIVE
C DIFF TOX A+B STL QL IA: POSITIVE

## 2021-10-03 ENCOUNTER — PATIENT MESSAGE (OUTPATIENT)
Dept: INFECTIOUS DISEASES | Facility: CLINIC | Age: 71
End: 2021-10-03

## 2021-10-04 ENCOUNTER — TELEPHONE (OUTPATIENT)
Dept: INFECTIOUS DISEASES | Facility: HOSPITAL | Age: 71
End: 2021-10-04

## 2021-10-04 RX ORDER — VANCOMYCIN HYDROCHLORIDE 125 MG/1
CAPSULE ORAL
Qty: 84 CAPSULE | Refills: 0 | Status: SHIPPED | OUTPATIENT
Start: 2021-10-04 | End: 2021-11-15

## 2021-10-04 RX ORDER — VANCOMYCIN HYDROCHLORIDE 125 MG/1
125 CAPSULE ORAL 4 TIMES DAILY
Qty: 84 CAPSULE | Refills: 0 | Status: CANCELLED | OUTPATIENT
Start: 2021-10-04 | End: 2021-11-15

## 2021-10-05 ENCOUNTER — TELEPHONE (OUTPATIENT)
Dept: INTERNAL MEDICINE | Facility: CLINIC | Age: 71
End: 2021-10-05
Payer: MEDICARE

## 2021-10-05 DIAGNOSIS — Z12.31 SCREENING MAMMOGRAM, ENCOUNTER FOR: Primary | ICD-10-CM

## 2021-10-07 ENCOUNTER — OFFICE VISIT (OUTPATIENT)
Dept: ENDOCRINOLOGY | Facility: CLINIC | Age: 71
End: 2021-10-07
Payer: MEDICARE

## 2021-10-07 VITALS
DIASTOLIC BLOOD PRESSURE: 84 MMHG | BODY MASS INDEX: 21.94 KG/M2 | OXYGEN SATURATION: 98 % | HEART RATE: 67 BPM | HEIGHT: 68 IN | SYSTOLIC BLOOD PRESSURE: 127 MMHG | WEIGHT: 144.75 LBS

## 2021-10-07 DIAGNOSIS — E04.1 THYROID NODULE: ICD-10-CM

## 2021-10-07 DIAGNOSIS — E21.0 PRIMARY HYPERPARATHYROIDISM: Primary | ICD-10-CM

## 2021-10-07 DIAGNOSIS — M81.8 OTHER OSTEOPOROSIS WITHOUT CURRENT PATHOLOGICAL FRACTURE: ICD-10-CM

## 2021-10-07 DIAGNOSIS — I10 PRIMARY HYPERTENSION: Chronic | ICD-10-CM

## 2021-10-07 DIAGNOSIS — N18.30 STAGE 3 CHRONIC KIDNEY DISEASE, UNSPECIFIED WHETHER STAGE 3A OR 3B CKD: ICD-10-CM

## 2021-10-07 DIAGNOSIS — E04.2 GOITER, NONTOXIC, MULTINODULAR: ICD-10-CM

## 2021-10-07 PROCEDURE — 3288F FALL RISK ASSESSMENT DOCD: CPT | Mod: HCNC,CPTII,S$GLB, | Performed by: INTERNAL MEDICINE

## 2021-10-07 PROCEDURE — 1157F ADVNC CARE PLAN IN RCRD: CPT | Mod: HCNC,CPTII,S$GLB, | Performed by: INTERNAL MEDICINE

## 2021-10-07 PROCEDURE — 3008F PR BODY MASS INDEX (BMI) DOCUMENTED: ICD-10-PCS | Mod: HCNC,CPTII,S$GLB, | Performed by: INTERNAL MEDICINE

## 2021-10-07 PROCEDURE — 1126F PR PAIN SEVERITY QUANTIFIED, NO PAIN PRESENT: ICD-10-PCS | Mod: HCNC,CPTII,S$GLB, | Performed by: INTERNAL MEDICINE

## 2021-10-07 PROCEDURE — 1159F MED LIST DOCD IN RCRD: CPT | Mod: HCNC,CPTII,S$GLB, | Performed by: INTERNAL MEDICINE

## 2021-10-07 PROCEDURE — 3044F PR MOST RECENT HEMOGLOBIN A1C LEVEL <7.0%: ICD-10-PCS | Mod: HCNC,CPTII,S$GLB, | Performed by: INTERNAL MEDICINE

## 2021-10-07 PROCEDURE — 3079F DIAST BP 80-89 MM HG: CPT | Mod: HCNC,CPTII,S$GLB, | Performed by: INTERNAL MEDICINE

## 2021-10-07 PROCEDURE — 3074F SYST BP LT 130 MM HG: CPT | Mod: HCNC,CPTII,S$GLB, | Performed by: INTERNAL MEDICINE

## 2021-10-07 PROCEDURE — 99499 UNLISTED E&M SERVICE: CPT | Mod: S$GLB,,, | Performed by: INTERNAL MEDICINE

## 2021-10-07 PROCEDURE — 1159F PR MEDICATION LIST DOCUMENTED IN MEDICAL RECORD: ICD-10-PCS | Mod: HCNC,CPTII,S$GLB, | Performed by: INTERNAL MEDICINE

## 2021-10-07 PROCEDURE — 1126F AMNT PAIN NOTED NONE PRSNT: CPT | Mod: HCNC,CPTII,S$GLB, | Performed by: INTERNAL MEDICINE

## 2021-10-07 PROCEDURE — 3079F PR MOST RECENT DIASTOLIC BLOOD PRESSURE 80-89 MM HG: ICD-10-PCS | Mod: HCNC,CPTII,S$GLB, | Performed by: INTERNAL MEDICINE

## 2021-10-07 PROCEDURE — 99214 PR OFFICE/OUTPT VISIT, EST, LEVL IV, 30-39 MIN: ICD-10-PCS | Mod: HCNC,S$GLB,, | Performed by: INTERNAL MEDICINE

## 2021-10-07 PROCEDURE — 1101F PT FALLS ASSESS-DOCD LE1/YR: CPT | Mod: HCNC,CPTII,S$GLB, | Performed by: INTERNAL MEDICINE

## 2021-10-07 PROCEDURE — 3044F HG A1C LEVEL LT 7.0%: CPT | Mod: HCNC,CPTII,S$GLB, | Performed by: INTERNAL MEDICINE

## 2021-10-07 PROCEDURE — 1160F RVW MEDS BY RX/DR IN RCRD: CPT | Mod: HCNC,CPTII,S$GLB, | Performed by: INTERNAL MEDICINE

## 2021-10-07 PROCEDURE — 99999 PR PBB SHADOW E&M-EST. PATIENT-LVL V: CPT | Mod: PBBFAC,HCNC,, | Performed by: INTERNAL MEDICINE

## 2021-10-07 PROCEDURE — 1101F PR PT FALLS ASSESS DOC 0-1 FALLS W/OUT INJ PAST YR: ICD-10-PCS | Mod: HCNC,CPTII,S$GLB, | Performed by: INTERNAL MEDICINE

## 2021-10-07 PROCEDURE — 99999 PR PBB SHADOW E&M-EST. PATIENT-LVL V: ICD-10-PCS | Mod: PBBFAC,HCNC,, | Performed by: INTERNAL MEDICINE

## 2021-10-07 PROCEDURE — 3008F BODY MASS INDEX DOCD: CPT | Mod: HCNC,CPTII,S$GLB, | Performed by: INTERNAL MEDICINE

## 2021-10-07 PROCEDURE — 1157F PR ADVANCE CARE PLAN OR EQUIV PRESENT IN MEDICAL RECORD: ICD-10-PCS | Mod: HCNC,CPTII,S$GLB, | Performed by: INTERNAL MEDICINE

## 2021-10-07 PROCEDURE — 3074F PR MOST RECENT SYSTOLIC BLOOD PRESSURE < 130 MM HG: ICD-10-PCS | Mod: HCNC,CPTII,S$GLB, | Performed by: INTERNAL MEDICINE

## 2021-10-07 PROCEDURE — 99499 RISK ADDL DX/OHS AUDIT: ICD-10-PCS | Mod: S$GLB,,, | Performed by: INTERNAL MEDICINE

## 2021-10-07 PROCEDURE — 4010F PR ACE/ARB THEARPY RXD/TAKEN: ICD-10-PCS | Mod: HCNC,CPTII,S$GLB, | Performed by: INTERNAL MEDICINE

## 2021-10-07 PROCEDURE — 99214 OFFICE O/P EST MOD 30 MIN: CPT | Mod: HCNC,S$GLB,, | Performed by: INTERNAL MEDICINE

## 2021-10-07 PROCEDURE — 3288F PR FALLS RISK ASSESSMENT DOCUMENTED: ICD-10-PCS | Mod: HCNC,CPTII,S$GLB, | Performed by: INTERNAL MEDICINE

## 2021-10-07 PROCEDURE — 1160F PR REVIEW ALL MEDS BY PRESCRIBER/CLIN PHARMACIST DOCUMENTED: ICD-10-PCS | Mod: HCNC,CPTII,S$GLB, | Performed by: INTERNAL MEDICINE

## 2021-10-07 PROCEDURE — 4010F ACE/ARB THERAPY RXD/TAKEN: CPT | Mod: HCNC,CPTII,S$GLB, | Performed by: INTERNAL MEDICINE

## 2021-10-08 ENCOUNTER — OFFICE VISIT (OUTPATIENT)
Dept: HEMATOLOGY/ONCOLOGY | Facility: CLINIC | Age: 71
End: 2021-10-08
Payer: MEDICARE

## 2021-10-08 DIAGNOSIS — D89.89 LIGHT CHAIN DISEASE, KAPPA TYPE: ICD-10-CM

## 2021-10-08 DIAGNOSIS — D89.89 LIGHT CHAIN DISEASE, KAPPA TYPE: Primary | ICD-10-CM

## 2021-10-08 DIAGNOSIS — M48.061 SPINAL STENOSIS OF LUMBAR REGION, UNSPECIFIED WHETHER NEUROGENIC CLAUDICATION PRESENT: ICD-10-CM

## 2021-10-08 DIAGNOSIS — A04.71 RECURRENT CLOSTRIDIOIDES DIFFICILE DIARRHEA: ICD-10-CM

## 2021-10-08 DIAGNOSIS — E21.0 PRIMARY HYPERPARATHYROIDISM: ICD-10-CM

## 2021-10-08 DIAGNOSIS — Z53.20 PROCEDURE NOT CARRIED OUT BECAUSE OF PATIENT'S DECISION: Primary | ICD-10-CM

## 2021-10-08 PROCEDURE — 99204 OFFICE O/P NEW MOD 45 MIN: CPT | Mod: HCNC,95,, | Performed by: STUDENT IN AN ORGANIZED HEALTH CARE EDUCATION/TRAINING PROGRAM

## 2021-10-08 PROCEDURE — 3044F PR MOST RECENT HEMOGLOBIN A1C LEVEL <7.0%: ICD-10-PCS | Mod: HCNC,CPTII,95, | Performed by: STUDENT IN AN ORGANIZED HEALTH CARE EDUCATION/TRAINING PROGRAM

## 2021-10-08 PROCEDURE — 1159F MED LIST DOCD IN RCRD: CPT | Mod: HCNC,CPTII,95, | Performed by: STUDENT IN AN ORGANIZED HEALTH CARE EDUCATION/TRAINING PROGRAM

## 2021-10-08 PROCEDURE — 1157F ADVNC CARE PLAN IN RCRD: CPT | Mod: HCNC,CPTII,95, | Performed by: STUDENT IN AN ORGANIZED HEALTH CARE EDUCATION/TRAINING PROGRAM

## 2021-10-08 PROCEDURE — 3044F HG A1C LEVEL LT 7.0%: CPT | Mod: HCNC,CPTII,95, | Performed by: STUDENT IN AN ORGANIZED HEALTH CARE EDUCATION/TRAINING PROGRAM

## 2021-10-08 PROCEDURE — 99499 UNLISTED E&M SERVICE: CPT | Mod: HCNC,S$GLB,, | Performed by: STUDENT IN AN ORGANIZED HEALTH CARE EDUCATION/TRAINING PROGRAM

## 2021-10-08 PROCEDURE — 1159F PR MEDICATION LIST DOCUMENTED IN MEDICAL RECORD: ICD-10-PCS | Mod: HCNC,CPTII,95, | Performed by: STUDENT IN AN ORGANIZED HEALTH CARE EDUCATION/TRAINING PROGRAM

## 2021-10-08 PROCEDURE — 4010F PR ACE/ARB THEARPY RXD/TAKEN: ICD-10-PCS | Mod: HCNC,CPTII,95, | Performed by: STUDENT IN AN ORGANIZED HEALTH CARE EDUCATION/TRAINING PROGRAM

## 2021-10-08 PROCEDURE — 1160F RVW MEDS BY RX/DR IN RCRD: CPT | Mod: HCNC,CPTII,95, | Performed by: STUDENT IN AN ORGANIZED HEALTH CARE EDUCATION/TRAINING PROGRAM

## 2021-10-08 PROCEDURE — 1160F PR REVIEW ALL MEDS BY PRESCRIBER/CLIN PHARMACIST DOCUMENTED: ICD-10-PCS | Mod: HCNC,CPTII,95, | Performed by: STUDENT IN AN ORGANIZED HEALTH CARE EDUCATION/TRAINING PROGRAM

## 2021-10-08 PROCEDURE — 1157F PR ADVANCE CARE PLAN OR EQUIV PRESENT IN MEDICAL RECORD: ICD-10-PCS | Mod: HCNC,CPTII,95, | Performed by: STUDENT IN AN ORGANIZED HEALTH CARE EDUCATION/TRAINING PROGRAM

## 2021-10-08 PROCEDURE — 4010F ACE/ARB THERAPY RXD/TAKEN: CPT | Mod: HCNC,CPTII,95, | Performed by: STUDENT IN AN ORGANIZED HEALTH CARE EDUCATION/TRAINING PROGRAM

## 2021-10-08 PROCEDURE — 99499 NO LOS: ICD-10-PCS | Mod: HCNC,S$GLB,, | Performed by: STUDENT IN AN ORGANIZED HEALTH CARE EDUCATION/TRAINING PROGRAM

## 2021-10-08 PROCEDURE — 99204 PR OFFICE/OUTPT VISIT, NEW, LEVL IV, 45-59 MIN: ICD-10-PCS | Mod: HCNC,95,, | Performed by: STUDENT IN AN ORGANIZED HEALTH CARE EDUCATION/TRAINING PROGRAM

## 2021-10-12 ENCOUNTER — HOSPITAL ENCOUNTER (OUTPATIENT)
Dept: CARDIOLOGY | Facility: CLINIC | Age: 71
Discharge: HOME | End: 2021-10-12
Payer: MEDICARE

## 2021-10-12 ENCOUNTER — OFFICE VISIT (OUTPATIENT)
Dept: ELECTROPHYSIOLOGY | Facility: CLINIC | Age: 71
End: 2021-10-12
Payer: MEDICARE

## 2021-10-12 ENCOUNTER — PATIENT OUTREACH (OUTPATIENT)
Dept: ADMINISTRATIVE | Facility: OTHER | Age: 71
End: 2021-10-12

## 2021-10-12 ENCOUNTER — CLINICAL SUPPORT (OUTPATIENT)
Dept: CARDIOLOGY | Facility: HOSPITAL | Age: 71
End: 2021-10-12
Attending: INTERNAL MEDICINE
Payer: MEDICARE

## 2021-10-12 VITALS
BODY MASS INDEX: 22.38 KG/M2 | HEIGHT: 68 IN | SYSTOLIC BLOOD PRESSURE: 110 MMHG | DIASTOLIC BLOOD PRESSURE: 58 MMHG | WEIGHT: 147.69 LBS

## 2021-10-12 DIAGNOSIS — I48.0 PAROXYSMAL ATRIAL FIBRILLATION: ICD-10-CM

## 2021-10-12 DIAGNOSIS — I49.5 SSS (SICK SINUS SYNDROME): Chronic | ICD-10-CM

## 2021-10-12 DIAGNOSIS — Z95.0 CARDIAC PACEMAKER IN SITU: Primary | ICD-10-CM

## 2021-10-12 DIAGNOSIS — I10 PRIMARY HYPERTENSION: Chronic | ICD-10-CM

## 2021-10-12 DIAGNOSIS — I49.5 SSS (SICK SINUS SYNDROME): ICD-10-CM

## 2021-10-12 DIAGNOSIS — I45.10 RBBB: ICD-10-CM

## 2021-10-12 DIAGNOSIS — G47.33 OSA (OBSTRUCTIVE SLEEP APNEA): ICD-10-CM

## 2021-10-12 DIAGNOSIS — I49.8 OTHER SPECIFIED CARDIAC ARRHYTHMIAS: ICD-10-CM

## 2021-10-12 PROCEDURE — 4010F PR ACE/ARB THEARPY RXD/TAKEN: ICD-10-PCS | Mod: HCNC,CPTII,S$GLB, | Performed by: NURSE PRACTITIONER

## 2021-10-12 PROCEDURE — 3074F SYST BP LT 130 MM HG: CPT | Mod: HCNC,CPTII,S$GLB, | Performed by: NURSE PRACTITIONER

## 2021-10-12 PROCEDURE — 93005 RHYTHM STRIP: ICD-10-PCS | Mod: HCNC,S$GLB,, | Performed by: INTERNAL MEDICINE

## 2021-10-12 PROCEDURE — 93280 CARDIAC DEVICE CHECK - IN CLINIC & HOSPITAL: ICD-10-PCS | Mod: 26,HCNC,, | Performed by: INTERNAL MEDICINE

## 2021-10-12 PROCEDURE — 99214 PR OFFICE/OUTPT VISIT, EST, LEVL IV, 30-39 MIN: ICD-10-PCS | Mod: HCNC,S$GLB,, | Performed by: NURSE PRACTITIONER

## 2021-10-12 PROCEDURE — 3008F PR BODY MASS INDEX (BMI) DOCUMENTED: ICD-10-PCS | Mod: HCNC,CPTII,S$GLB, | Performed by: NURSE PRACTITIONER

## 2021-10-12 PROCEDURE — 3008F BODY MASS INDEX DOCD: CPT | Mod: HCNC,CPTII,S$GLB, | Performed by: NURSE PRACTITIONER

## 2021-10-12 PROCEDURE — 3078F DIAST BP <80 MM HG: CPT | Mod: HCNC,CPTII,S$GLB, | Performed by: NURSE PRACTITIONER

## 2021-10-12 PROCEDURE — 3074F PR MOST RECENT SYSTOLIC BLOOD PRESSURE < 130 MM HG: ICD-10-PCS | Mod: HCNC,CPTII,S$GLB, | Performed by: NURSE PRACTITIONER

## 2021-10-12 PROCEDURE — 1159F MED LIST DOCD IN RCRD: CPT | Mod: HCNC,CPTII,S$GLB, | Performed by: NURSE PRACTITIONER

## 2021-10-12 PROCEDURE — 3078F PR MOST RECENT DIASTOLIC BLOOD PRESSURE < 80 MM HG: ICD-10-PCS | Mod: HCNC,CPTII,S$GLB, | Performed by: NURSE PRACTITIONER

## 2021-10-12 PROCEDURE — 3044F PR MOST RECENT HEMOGLOBIN A1C LEVEL <7.0%: ICD-10-PCS | Mod: HCNC,CPTII,S$GLB, | Performed by: NURSE PRACTITIONER

## 2021-10-12 PROCEDURE — 4010F ACE/ARB THERAPY RXD/TAKEN: CPT | Mod: HCNC,CPTII,S$GLB, | Performed by: NURSE PRACTITIONER

## 2021-10-12 PROCEDURE — 99999 PR PBB SHADOW E&M-EST. PATIENT-LVL IV: CPT | Mod: PBBFAC,HCNC,, | Performed by: NURSE PRACTITIONER

## 2021-10-12 PROCEDURE — 1160F PR REVIEW ALL MEDS BY PRESCRIBER/CLIN PHARMACIST DOCUMENTED: ICD-10-PCS | Mod: HCNC,CPTII,S$GLB, | Performed by: NURSE PRACTITIONER

## 2021-10-12 PROCEDURE — 1157F ADVNC CARE PLAN IN RCRD: CPT | Mod: HCNC,CPTII,S$GLB, | Performed by: NURSE PRACTITIONER

## 2021-10-12 PROCEDURE — 93010 RHYTHM STRIP: ICD-10-PCS | Mod: HCNC,S$GLB,, | Performed by: INTERNAL MEDICINE

## 2021-10-12 PROCEDURE — 93005 ELECTROCARDIOGRAM TRACING: CPT | Mod: HCNC,S$GLB,, | Performed by: INTERNAL MEDICINE

## 2021-10-12 PROCEDURE — 99214 OFFICE O/P EST MOD 30 MIN: CPT | Mod: HCNC,S$GLB,, | Performed by: NURSE PRACTITIONER

## 2021-10-12 PROCEDURE — 1160F RVW MEDS BY RX/DR IN RCRD: CPT | Mod: HCNC,CPTII,S$GLB, | Performed by: NURSE PRACTITIONER

## 2021-10-12 PROCEDURE — 1157F PR ADVANCE CARE PLAN OR EQUIV PRESENT IN MEDICAL RECORD: ICD-10-PCS | Mod: HCNC,CPTII,S$GLB, | Performed by: NURSE PRACTITIONER

## 2021-10-12 PROCEDURE — 1126F AMNT PAIN NOTED NONE PRSNT: CPT | Mod: HCNC,CPTII,S$GLB, | Performed by: NURSE PRACTITIONER

## 2021-10-12 PROCEDURE — 93280 PM DEVICE PROGR EVAL DUAL: CPT | Mod: 26,HCNC,, | Performed by: INTERNAL MEDICINE

## 2021-10-12 PROCEDURE — 3044F HG A1C LEVEL LT 7.0%: CPT | Mod: HCNC,CPTII,S$GLB, | Performed by: NURSE PRACTITIONER

## 2021-10-12 PROCEDURE — 93280 PM DEVICE PROGR EVAL DUAL: CPT | Mod: HCNC

## 2021-10-12 PROCEDURE — 1126F PR PAIN SEVERITY QUANTIFIED, NO PAIN PRESENT: ICD-10-PCS | Mod: HCNC,CPTII,S$GLB, | Performed by: NURSE PRACTITIONER

## 2021-10-12 PROCEDURE — 1159F PR MEDICATION LIST DOCUMENTED IN MEDICAL RECORD: ICD-10-PCS | Mod: HCNC,CPTII,S$GLB, | Performed by: NURSE PRACTITIONER

## 2021-10-12 PROCEDURE — 99999 PR PBB SHADOW E&M-EST. PATIENT-LVL IV: ICD-10-PCS | Mod: PBBFAC,HCNC,, | Performed by: NURSE PRACTITIONER

## 2021-10-12 PROCEDURE — 93010 ELECTROCARDIOGRAM REPORT: CPT | Mod: HCNC,S$GLB,, | Performed by: INTERNAL MEDICINE

## 2021-10-13 ENCOUNTER — HOSPITAL ENCOUNTER (OUTPATIENT)
Dept: RADIOLOGY | Facility: HOSPITAL | Age: 71
Discharge: HOME | End: 2021-10-13
Attending: INTERNAL MEDICINE
Payer: MEDICARE

## 2021-10-13 VITALS — BODY MASS INDEX: 22.28 KG/M2 | HEIGHT: 68 IN | WEIGHT: 147 LBS

## 2021-10-13 DIAGNOSIS — Z12.31 SCREENING MAMMOGRAM, ENCOUNTER FOR: ICD-10-CM

## 2021-10-13 PROCEDURE — 77063 MAMMO DIGITAL SCREENING BILAT WITH TOMO: ICD-10-PCS | Mod: 26,HCNC,, | Performed by: RADIOLOGY

## 2021-10-13 PROCEDURE — 77067 SCR MAMMO BI INCL CAD: CPT | Mod: TC,HCNC

## 2021-10-13 PROCEDURE — 77063 BREAST TOMOSYNTHESIS BI: CPT | Mod: 26,HCNC,, | Performed by: RADIOLOGY

## 2021-10-13 PROCEDURE — 77067 MAMMO DIGITAL SCREENING BILAT WITH TOMO: ICD-10-PCS | Mod: 26,HCNC,, | Performed by: RADIOLOGY

## 2021-10-13 PROCEDURE — 77067 SCR MAMMO BI INCL CAD: CPT | Mod: 26,HCNC,, | Performed by: RADIOLOGY

## 2021-10-14 ENCOUNTER — IMMUNIZATION (OUTPATIENT)
Dept: PHARMACY | Facility: CLINIC | Age: 71
End: 2021-10-14
Payer: MEDICARE

## 2021-10-18 ENCOUNTER — TELEPHONE (OUTPATIENT)
Dept: HEMATOLOGY/ONCOLOGY | Facility: CLINIC | Age: 71
End: 2021-10-18

## 2021-10-20 ENCOUNTER — PES CALL (OUTPATIENT)
Dept: ADMINISTRATIVE | Facility: CLINIC | Age: 71
End: 2021-10-20

## 2021-10-21 ENCOUNTER — TELEPHONE (OUTPATIENT)
Dept: ADMINISTRATIVE | Facility: CLINIC | Age: 71
End: 2021-10-21

## 2021-10-21 ENCOUNTER — LAB VISIT (OUTPATIENT)
Dept: LAB | Facility: OTHER | Age: 71
End: 2021-10-21
Attending: STUDENT IN AN ORGANIZED HEALTH CARE EDUCATION/TRAINING PROGRAM
Payer: MEDICARE

## 2021-10-21 DIAGNOSIS — D89.89 LIGHT CHAIN DISEASE, KAPPA TYPE: ICD-10-CM

## 2021-10-21 LAB
PROT 24H UR-MRATE: 86 MG/SPEC (ref 0–100)
PROT UR-MCNC: 9 MG/DL (ref 0–15)
URINE COLLECTION DURATION: 24 HR
URINE VOLUME: 950 ML

## 2021-10-21 PROCEDURE — 84156 ASSAY OF PROTEIN URINE: CPT | Mod: HCNC | Performed by: STUDENT IN AN ORGANIZED HEALTH CARE EDUCATION/TRAINING PROGRAM

## 2021-10-21 PROCEDURE — 86335 PATHOLOGIST INTERPRETATION UIFE: ICD-10-PCS | Mod: 26,HCNC,, | Performed by: PATHOLOGY

## 2021-10-21 PROCEDURE — 86335 IMMUNFIX E-PHORSIS/URINE/CSF: CPT | Mod: 26,HCNC,, | Performed by: PATHOLOGY

## 2021-10-21 PROCEDURE — 86335 IMMUNFIX E-PHORSIS/URINE/CSF: CPT | Mod: HCNC | Performed by: STUDENT IN AN ORGANIZED HEALTH CARE EDUCATION/TRAINING PROGRAM

## 2021-10-22 ENCOUNTER — OFFICE VISIT (OUTPATIENT)
Dept: INTERNAL MEDICINE | Facility: CLINIC | Age: 71
End: 2021-10-22
Payer: MEDICARE

## 2021-10-22 ENCOUNTER — TELEPHONE (OUTPATIENT)
Dept: ADMINISTRATIVE | Facility: CLINIC | Age: 71
End: 2021-10-22

## 2021-10-22 DIAGNOSIS — Z00.00 ENCOUNTER FOR PREVENTIVE HEALTH EXAMINATION: Primary | ICD-10-CM

## 2021-10-22 DIAGNOSIS — E78.5 DYSLIPIDEMIA: ICD-10-CM

## 2021-10-22 DIAGNOSIS — M47.816 FACET ARTHRITIS OF LUMBAR REGION: ICD-10-CM

## 2021-10-22 DIAGNOSIS — I48.0 PAROXYSMAL ATRIAL FIBRILLATION: ICD-10-CM

## 2021-10-22 DIAGNOSIS — I10 PRIMARY HYPERTENSION: Chronic | ICD-10-CM

## 2021-10-22 DIAGNOSIS — E21.0 PRIMARY HYPERPARATHYROIDISM: ICD-10-CM

## 2021-10-22 DIAGNOSIS — Z95.0 CARDIAC PACEMAKER IN SITU: ICD-10-CM

## 2021-10-22 DIAGNOSIS — I49.5 SSS (SICK SINUS SYNDROME): Chronic | ICD-10-CM

## 2021-10-22 DIAGNOSIS — N18.30 STAGE 3 CHRONIC KIDNEY DISEASE, UNSPECIFIED WHETHER STAGE 3A OR 3B CKD: ICD-10-CM

## 2021-10-22 DIAGNOSIS — G95.9 CERVICAL MYELOPATHY: ICD-10-CM

## 2021-10-22 DIAGNOSIS — M81.8 OTHER OSTEOPOROSIS WITHOUT CURRENT PATHOLOGICAL FRACTURE: ICD-10-CM

## 2021-10-22 DIAGNOSIS — G60.8 SENSORY PERIPHERAL NEUROPATHY: ICD-10-CM

## 2021-10-22 PROCEDURE — 3044F PR MOST RECENT HEMOGLOBIN A1C LEVEL <7.0%: ICD-10-PCS | Mod: HCNC,CPTII,S$GLB, | Performed by: NURSE PRACTITIONER

## 2021-10-22 PROCEDURE — 1159F PR MEDICATION LIST DOCUMENTED IN MEDICAL RECORD: ICD-10-PCS | Mod: HCNC,CPTII,S$GLB, | Performed by: NURSE PRACTITIONER

## 2021-10-22 PROCEDURE — 1170F PR FUNCTIONAL STATUS ASSESSED: ICD-10-PCS | Mod: HCNC,CPTII,S$GLB, | Performed by: NURSE PRACTITIONER

## 2021-10-22 PROCEDURE — G0439 PR MEDICARE ANNUAL WELLNESS SUBSEQUENT VISIT: ICD-10-PCS | Mod: 95,HCNC,, | Performed by: NURSE PRACTITIONER

## 2021-10-22 PROCEDURE — 1157F PR ADVANCE CARE PLAN OR EQUIV PRESENT IN MEDICAL RECORD: ICD-10-PCS | Mod: HCNC,CPTII,S$GLB, | Performed by: NURSE PRACTITIONER

## 2021-10-22 PROCEDURE — G0439 PPPS, SUBSEQ VISIT: HCPCS | Mod: 95,HCNC,, | Performed by: NURSE PRACTITIONER

## 2021-10-22 PROCEDURE — 3044F HG A1C LEVEL LT 7.0%: CPT | Mod: HCNC,CPTII,S$GLB, | Performed by: NURSE PRACTITIONER

## 2021-10-22 PROCEDURE — 1159F MED LIST DOCD IN RCRD: CPT | Mod: HCNC,CPTII,S$GLB, | Performed by: NURSE PRACTITIONER

## 2021-10-22 PROCEDURE — 1157F ADVNC CARE PLAN IN RCRD: CPT | Mod: HCNC,CPTII,S$GLB, | Performed by: NURSE PRACTITIONER

## 2021-10-22 PROCEDURE — 4010F ACE/ARB THERAPY RXD/TAKEN: CPT | Mod: HCNC,CPTII,S$GLB, | Performed by: NURSE PRACTITIONER

## 2021-10-22 PROCEDURE — 1160F PR REVIEW ALL MEDS BY PRESCRIBER/CLIN PHARMACIST DOCUMENTED: ICD-10-PCS | Mod: HCNC,CPTII,S$GLB, | Performed by: NURSE PRACTITIONER

## 2021-10-22 PROCEDURE — 1170F FXNL STATUS ASSESSED: CPT | Mod: HCNC,CPTII,S$GLB, | Performed by: NURSE PRACTITIONER

## 2021-10-22 PROCEDURE — 4010F PR ACE/ARB THEARPY RXD/TAKEN: ICD-10-PCS | Mod: HCNC,CPTII,S$GLB, | Performed by: NURSE PRACTITIONER

## 2021-10-22 PROCEDURE — 1160F RVW MEDS BY RX/DR IN RCRD: CPT | Mod: HCNC,CPTII,S$GLB, | Performed by: NURSE PRACTITIONER

## 2021-10-25 LAB
INTERPRETATION UR IFE-IMP: NORMAL
PATHOLOGIST INTERPRETATION UIFE: NORMAL

## 2021-10-28 ENCOUNTER — IMMUNIZATION (OUTPATIENT)
Dept: PHARMACY | Facility: CLINIC | Age: 71
End: 2021-10-28
Payer: MEDICARE

## 2021-10-28 DIAGNOSIS — Z23 NEED FOR VACCINATION: Primary | ICD-10-CM

## 2021-11-09 ENCOUNTER — OFFICE VISIT (OUTPATIENT)
Dept: OTOLARYNGOLOGY | Facility: CLINIC | Age: 71
End: 2021-11-09
Payer: MEDICARE

## 2021-11-09 VITALS — WEIGHT: 150.13 LBS | BODY MASS INDEX: 22.83 KG/M2

## 2021-11-09 DIAGNOSIS — H61.23 IMPACTED CERUMEN, BILATERAL: Primary | ICD-10-CM

## 2021-11-09 PROCEDURE — 3044F PR MOST RECENT HEMOGLOBIN A1C LEVEL <7.0%: ICD-10-PCS | Mod: HCNC,CPTII,S$GLB, | Performed by: OTOLARYNGOLOGY

## 2021-11-09 PROCEDURE — 99499 UNLISTED E&M SERVICE: CPT | Mod: HCNC,S$GLB,, | Performed by: OTOLARYNGOLOGY

## 2021-11-09 PROCEDURE — 4010F PR ACE/ARB THEARPY RXD/TAKEN: ICD-10-PCS | Mod: HCNC,CPTII,S$GLB, | Performed by: OTOLARYNGOLOGY

## 2021-11-09 PROCEDURE — 1126F AMNT PAIN NOTED NONE PRSNT: CPT | Mod: HCNC,CPTII,S$GLB, | Performed by: OTOLARYNGOLOGY

## 2021-11-09 PROCEDURE — 1157F ADVNC CARE PLAN IN RCRD: CPT | Mod: HCNC,CPTII,S$GLB, | Performed by: OTOLARYNGOLOGY

## 2021-11-09 PROCEDURE — 1159F PR MEDICATION LIST DOCUMENTED IN MEDICAL RECORD: ICD-10-PCS | Mod: HCNC,CPTII,S$GLB, | Performed by: OTOLARYNGOLOGY

## 2021-11-09 PROCEDURE — 99999 PR PBB SHADOW E&M-EST. PATIENT-LVL III: ICD-10-PCS | Mod: PBBFAC,HCNC,, | Performed by: OTOLARYNGOLOGY

## 2021-11-09 PROCEDURE — 1160F PR REVIEW ALL MEDS BY PRESCRIBER/CLIN PHARMACIST DOCUMENTED: ICD-10-PCS | Mod: HCNC,CPTII,S$GLB, | Performed by: OTOLARYNGOLOGY

## 2021-11-09 PROCEDURE — 1101F PR PT FALLS ASSESS DOC 0-1 FALLS W/OUT INJ PAST YR: ICD-10-PCS | Mod: HCNC,CPTII,S$GLB, | Performed by: OTOLARYNGOLOGY

## 2021-11-09 PROCEDURE — 69210 EAR CERUMEN REMOVAL: ICD-10-PCS | Mod: HCNC,S$GLB,, | Performed by: OTOLARYNGOLOGY

## 2021-11-09 PROCEDURE — 3288F PR FALLS RISK ASSESSMENT DOCUMENTED: ICD-10-PCS | Mod: HCNC,CPTII,S$GLB, | Performed by: OTOLARYNGOLOGY

## 2021-11-09 PROCEDURE — 1160F RVW MEDS BY RX/DR IN RCRD: CPT | Mod: HCNC,CPTII,S$GLB, | Performed by: OTOLARYNGOLOGY

## 2021-11-09 PROCEDURE — 3288F FALL RISK ASSESSMENT DOCD: CPT | Mod: HCNC,CPTII,S$GLB, | Performed by: OTOLARYNGOLOGY

## 2021-11-09 PROCEDURE — 1159F MED LIST DOCD IN RCRD: CPT | Mod: HCNC,CPTII,S$GLB, | Performed by: OTOLARYNGOLOGY

## 2021-11-09 PROCEDURE — 99499 NO LOS: ICD-10-PCS | Mod: HCNC,S$GLB,, | Performed by: OTOLARYNGOLOGY

## 2021-11-09 PROCEDURE — 3044F HG A1C LEVEL LT 7.0%: CPT | Mod: HCNC,CPTII,S$GLB, | Performed by: OTOLARYNGOLOGY

## 2021-11-09 PROCEDURE — 1157F PR ADVANCE CARE PLAN OR EQUIV PRESENT IN MEDICAL RECORD: ICD-10-PCS | Mod: HCNC,CPTII,S$GLB, | Performed by: OTOLARYNGOLOGY

## 2021-11-09 PROCEDURE — 1101F PT FALLS ASSESS-DOCD LE1/YR: CPT | Mod: HCNC,CPTII,S$GLB, | Performed by: OTOLARYNGOLOGY

## 2021-11-09 PROCEDURE — 4010F ACE/ARB THERAPY RXD/TAKEN: CPT | Mod: HCNC,CPTII,S$GLB, | Performed by: OTOLARYNGOLOGY

## 2021-11-09 PROCEDURE — 3008F PR BODY MASS INDEX (BMI) DOCUMENTED: ICD-10-PCS | Mod: HCNC,CPTII,S$GLB, | Performed by: OTOLARYNGOLOGY

## 2021-11-09 PROCEDURE — 69210 REMOVE IMPACTED EAR WAX UNI: CPT | Mod: HCNC,S$GLB,, | Performed by: OTOLARYNGOLOGY

## 2021-11-09 PROCEDURE — 3008F BODY MASS INDEX DOCD: CPT | Mod: HCNC,CPTII,S$GLB, | Performed by: OTOLARYNGOLOGY

## 2021-11-09 PROCEDURE — 1126F PR PAIN SEVERITY QUANTIFIED, NO PAIN PRESENT: ICD-10-PCS | Mod: HCNC,CPTII,S$GLB, | Performed by: OTOLARYNGOLOGY

## 2021-11-09 PROCEDURE — 99999 PR PBB SHADOW E&M-EST. PATIENT-LVL III: CPT | Mod: PBBFAC,HCNC,, | Performed by: OTOLARYNGOLOGY

## 2021-11-17 ENCOUNTER — OFFICE VISIT (OUTPATIENT)
Dept: UROGYNECOLOGY | Facility: CLINIC | Age: 71
End: 2021-11-17
Payer: MEDICARE

## 2021-11-17 VITALS — BODY MASS INDEX: 23.15 KG/M2 | HEIGHT: 68 IN | WEIGHT: 152.75 LBS

## 2021-11-17 DIAGNOSIS — R32 URINARY INCONTINENCE, UNSPECIFIED TYPE: ICD-10-CM

## 2021-11-17 DIAGNOSIS — Z87.19 HISTORY OF CHRONIC CONSTIPATION: ICD-10-CM

## 2021-11-17 DIAGNOSIS — A04.71 RECURRENT CLOSTRIDIOIDES DIFFICILE DIARRHEA: ICD-10-CM

## 2021-11-17 DIAGNOSIS — N95.2 VAGINAL ATROPHY: ICD-10-CM

## 2021-11-17 DIAGNOSIS — R35.1 NOCTURIA MORE THAN TWICE PER NIGHT: ICD-10-CM

## 2021-11-17 DIAGNOSIS — N39.46 MIXED STRESS AND URGE INCONTINENCE: Primary | ICD-10-CM

## 2021-11-17 DIAGNOSIS — R35.0 URINARY FREQUENCY: ICD-10-CM

## 2021-11-17 PROCEDURE — 1159F PR MEDICATION LIST DOCUMENTED IN MEDICAL RECORD: ICD-10-PCS | Mod: HCNC,CPTII,S$GLB, | Performed by: OBSTETRICS & GYNECOLOGY

## 2021-11-17 PROCEDURE — 4010F ACE/ARB THERAPY RXD/TAKEN: CPT | Mod: HCNC,CPTII,S$GLB, | Performed by: OBSTETRICS & GYNECOLOGY

## 2021-11-17 PROCEDURE — 3044F PR MOST RECENT HEMOGLOBIN A1C LEVEL <7.0%: ICD-10-PCS | Mod: HCNC,CPTII,S$GLB, | Performed by: OBSTETRICS & GYNECOLOGY

## 2021-11-17 PROCEDURE — 1157F ADVNC CARE PLAN IN RCRD: CPT | Mod: HCNC,CPTII,S$GLB, | Performed by: OBSTETRICS & GYNECOLOGY

## 2021-11-17 PROCEDURE — 99999 PR PBB SHADOW E&M-EST. PATIENT-LVL V: CPT | Mod: PBBFAC,HCNC,, | Performed by: OBSTETRICS & GYNECOLOGY

## 2021-11-17 PROCEDURE — 3044F HG A1C LEVEL LT 7.0%: CPT | Mod: HCNC,CPTII,S$GLB, | Performed by: OBSTETRICS & GYNECOLOGY

## 2021-11-17 PROCEDURE — 1126F PR PAIN SEVERITY QUANTIFIED, NO PAIN PRESENT: ICD-10-PCS | Mod: HCNC,CPTII,S$GLB, | Performed by: OBSTETRICS & GYNECOLOGY

## 2021-11-17 PROCEDURE — 99214 PR OFFICE/OUTPT VISIT, EST, LEVL IV, 30-39 MIN: ICD-10-PCS | Mod: HCNC,S$GLB,, | Performed by: OBSTETRICS & GYNECOLOGY

## 2021-11-17 PROCEDURE — 3008F BODY MASS INDEX DOCD: CPT | Mod: HCNC,CPTII,S$GLB, | Performed by: OBSTETRICS & GYNECOLOGY

## 2021-11-17 PROCEDURE — 99999 PR PBB SHADOW E&M-EST. PATIENT-LVL V: ICD-10-PCS | Mod: PBBFAC,HCNC,, | Performed by: OBSTETRICS & GYNECOLOGY

## 2021-11-17 PROCEDURE — 1159F MED LIST DOCD IN RCRD: CPT | Mod: HCNC,CPTII,S$GLB, | Performed by: OBSTETRICS & GYNECOLOGY

## 2021-11-17 PROCEDURE — 1126F AMNT PAIN NOTED NONE PRSNT: CPT | Mod: HCNC,CPTII,S$GLB, | Performed by: OBSTETRICS & GYNECOLOGY

## 2021-11-17 PROCEDURE — 3008F PR BODY MASS INDEX (BMI) DOCUMENTED: ICD-10-PCS | Mod: HCNC,CPTII,S$GLB, | Performed by: OBSTETRICS & GYNECOLOGY

## 2021-11-17 PROCEDURE — 99214 OFFICE O/P EST MOD 30 MIN: CPT | Mod: HCNC,S$GLB,, | Performed by: OBSTETRICS & GYNECOLOGY

## 2021-11-17 PROCEDURE — 1101F PT FALLS ASSESS-DOCD LE1/YR: CPT | Mod: HCNC,CPTII,S$GLB, | Performed by: OBSTETRICS & GYNECOLOGY

## 2021-11-17 PROCEDURE — 1101F PR PT FALLS ASSESS DOC 0-1 FALLS W/OUT INJ PAST YR: ICD-10-PCS | Mod: HCNC,CPTII,S$GLB, | Performed by: OBSTETRICS & GYNECOLOGY

## 2021-11-17 PROCEDURE — 1160F PR REVIEW ALL MEDS BY PRESCRIBER/CLIN PHARMACIST DOCUMENTED: ICD-10-PCS | Mod: HCNC,CPTII,S$GLB, | Performed by: OBSTETRICS & GYNECOLOGY

## 2021-11-17 PROCEDURE — 3288F PR FALLS RISK ASSESSMENT DOCUMENTED: ICD-10-PCS | Mod: HCNC,CPTII,S$GLB, | Performed by: OBSTETRICS & GYNECOLOGY

## 2021-11-17 PROCEDURE — 1160F RVW MEDS BY RX/DR IN RCRD: CPT | Mod: HCNC,CPTII,S$GLB, | Performed by: OBSTETRICS & GYNECOLOGY

## 2021-11-17 PROCEDURE — 1157F PR ADVANCE CARE PLAN OR EQUIV PRESENT IN MEDICAL RECORD: ICD-10-PCS | Mod: HCNC,CPTII,S$GLB, | Performed by: OBSTETRICS & GYNECOLOGY

## 2021-11-17 PROCEDURE — 3288F FALL RISK ASSESSMENT DOCD: CPT | Mod: HCNC,CPTII,S$GLB, | Performed by: OBSTETRICS & GYNECOLOGY

## 2021-11-17 PROCEDURE — 4010F PR ACE/ARB THEARPY RXD/TAKEN: ICD-10-PCS | Mod: HCNC,CPTII,S$GLB, | Performed by: OBSTETRICS & GYNECOLOGY

## 2021-11-17 RX ORDER — ESTRADIOL 0.1 MG/G
1 CREAM VAGINAL
Qty: 42.5 G | Refills: 2 | Status: ON HOLD | OUTPATIENT
Start: 2021-11-18 | End: 2022-11-23 | Stop reason: HOSPADM

## 2021-11-18 ENCOUNTER — PATIENT MESSAGE (OUTPATIENT)
Dept: ADMINISTRATIVE | Facility: OTHER | Age: 71
End: 2021-11-18
Payer: MEDICARE

## 2021-11-18 ENCOUNTER — PATIENT MESSAGE (OUTPATIENT)
Dept: INFECTIOUS DISEASES | Facility: CLINIC | Age: 71
End: 2021-11-18
Payer: MEDICARE

## 2021-11-18 PROBLEM — Z91.81 AT RISK FOR FALLS: Status: ACTIVE | Noted: 2021-11-18

## 2021-11-18 PROBLEM — Z91.81 HISTORY OF FALL: Status: ACTIVE | Noted: 2021-11-18

## 2021-11-22 ENCOUNTER — OFFICE VISIT (OUTPATIENT)
Dept: PODIATRY | Facility: CLINIC | Age: 71
End: 2021-11-22
Payer: MEDICARE

## 2021-11-22 VITALS
SYSTOLIC BLOOD PRESSURE: 125 MMHG | WEIGHT: 149.94 LBS | HEART RATE: 68 BPM | BODY MASS INDEX: 22.72 KG/M2 | DIASTOLIC BLOOD PRESSURE: 71 MMHG | RESPIRATION RATE: 18 BRPM | HEIGHT: 68 IN

## 2021-11-22 DIAGNOSIS — L60.9 DISEASE OF NAIL: ICD-10-CM

## 2021-11-22 DIAGNOSIS — M20.41 HAMMER TOES OF BOTH FEET: ICD-10-CM

## 2021-11-22 DIAGNOSIS — M79.672 LEFT FOOT PAIN: ICD-10-CM

## 2021-11-22 DIAGNOSIS — G60.8 SENSORY PERIPHERAL NEUROPATHY: Primary | ICD-10-CM

## 2021-11-22 DIAGNOSIS — M20.42 HAMMER TOES OF BOTH FEET: ICD-10-CM

## 2021-11-22 PROCEDURE — 4010F ACE/ARB THERAPY RXD/TAKEN: CPT | Mod: HCNC,CPTII,S$GLB, | Performed by: PODIATRIST

## 2021-11-22 PROCEDURE — 99999 PR PBB SHADOW E&M-EST. PATIENT-LVL III: CPT | Mod: PBBFAC,HCNC,, | Performed by: PODIATRIST

## 2021-11-22 PROCEDURE — 99213 OFFICE O/P EST LOW 20 MIN: CPT | Mod: 25,HCNC,S$GLB, | Performed by: PODIATRIST

## 2021-11-22 PROCEDURE — 1157F ADVNC CARE PLAN IN RCRD: CPT | Mod: HCNC,CPTII,S$GLB, | Performed by: PODIATRIST

## 2021-11-22 PROCEDURE — 99999 PR PBB SHADOW E&M-EST. PATIENT-LVL III: ICD-10-PCS | Mod: PBBFAC,HCNC,, | Performed by: PODIATRIST

## 2021-11-22 PROCEDURE — 99213 PR OFFICE/OUTPT VISIT, EST, LEVL III, 20-29 MIN: ICD-10-PCS | Mod: 25,HCNC,S$GLB, | Performed by: PODIATRIST

## 2021-11-22 PROCEDURE — 4010F PR ACE/ARB THEARPY RXD/TAKEN: ICD-10-PCS | Mod: HCNC,CPTII,S$GLB, | Performed by: PODIATRIST

## 2021-11-22 PROCEDURE — 1157F PR ADVANCE CARE PLAN OR EQUIV PRESENT IN MEDICAL RECORD: ICD-10-PCS | Mod: HCNC,CPTII,S$GLB, | Performed by: PODIATRIST

## 2021-11-27 ENCOUNTER — PATIENT MESSAGE (OUTPATIENT)
Dept: ADMINISTRATIVE | Facility: OTHER | Age: 71
End: 2021-11-27
Payer: MEDICARE

## 2021-12-06 ENCOUNTER — PATIENT MESSAGE (OUTPATIENT)
Dept: NEUROSURGERY | Facility: CLINIC | Age: 71
End: 2021-12-06
Payer: MEDICARE

## 2021-12-07 ENCOUNTER — PATIENT MESSAGE (OUTPATIENT)
Dept: INFECTIOUS DISEASES | Facility: CLINIC | Age: 71
End: 2021-12-07
Payer: MEDICARE

## 2021-12-07 ENCOUNTER — PATIENT MESSAGE (OUTPATIENT)
Dept: ADMINISTRATIVE | Facility: OTHER | Age: 71
End: 2021-12-07
Payer: MEDICARE

## 2021-12-07 ENCOUNTER — PATIENT MESSAGE (OUTPATIENT)
Dept: NEUROSURGERY | Facility: CLINIC | Age: 71
End: 2021-12-07
Payer: MEDICARE

## 2021-12-10 ENCOUNTER — TELEPHONE (OUTPATIENT)
Dept: INFECTIOUS DISEASES | Facility: CLINIC | Age: 71
End: 2021-12-10
Payer: MEDICARE

## 2021-12-10 DIAGNOSIS — Z86.19 HISTORY OF CLOSTRIDIOIDES DIFFICILE COLITIS: Primary | ICD-10-CM

## 2021-12-10 DIAGNOSIS — A04.71 RECURRENT CLOSTRIDIOIDES DIFFICILE DIARRHEA: ICD-10-CM

## 2021-12-10 PROBLEM — R27.9 LACK OF COORDINATION: Status: ACTIVE | Noted: 2021-12-10

## 2021-12-10 PROBLEM — M62.81 MUSCLE WEAKNESS: Status: ACTIVE | Noted: 2021-12-10

## 2021-12-10 RX ORDER — VANCOMYCIN HYDROCHLORIDE 125 MG/1
CAPSULE ORAL
Qty: 104 CAPSULE | Refills: 0 | Status: SHIPPED | OUTPATIENT
Start: 2021-12-10 | End: 2022-01-21 | Stop reason: DRUGHIGH

## 2021-12-20 ENCOUNTER — OFFICE VISIT (OUTPATIENT)
Dept: OPTOMETRY | Facility: CLINIC | Age: 71
End: 2021-12-20
Payer: COMMERCIAL

## 2021-12-20 ENCOUNTER — PATIENT MESSAGE (OUTPATIENT)
Dept: NEUROSURGERY | Facility: CLINIC | Age: 71
End: 2021-12-20
Payer: MEDICARE

## 2021-12-20 DIAGNOSIS — H25.13 NUCLEAR SCLEROSIS, BILATERAL: ICD-10-CM

## 2021-12-20 DIAGNOSIS — H52.4 PRESBYOPIA: ICD-10-CM

## 2021-12-20 DIAGNOSIS — Z01.00 EXAMINATION OF EYES AND VISION: Primary | ICD-10-CM

## 2021-12-20 PROCEDURE — 92014 PR EYE EXAM, EST PATIENT,COMPREHESV: ICD-10-PCS | Mod: HCNC,S$GLB,, | Performed by: OPTOMETRIST

## 2021-12-20 PROCEDURE — 99999 PR PBB SHADOW E&M-EST. PATIENT-LVL III: ICD-10-PCS | Mod: PBBFAC,HCNC,, | Performed by: OPTOMETRIST

## 2021-12-20 PROCEDURE — 92015 PR REFRACTION: ICD-10-PCS | Mod: HCNC,S$GLB,, | Performed by: OPTOMETRIST

## 2021-12-20 PROCEDURE — 4010F PR ACE/ARB THEARPY RXD/TAKEN: ICD-10-PCS | Mod: HCNC,CPTII,S$GLB, | Performed by: OPTOMETRIST

## 2021-12-20 PROCEDURE — 1157F PR ADVANCE CARE PLAN OR EQUIV PRESENT IN MEDICAL RECORD: ICD-10-PCS | Mod: HCNC,CPTII,S$GLB, | Performed by: OPTOMETRIST

## 2021-12-20 PROCEDURE — 1157F ADVNC CARE PLAN IN RCRD: CPT | Mod: HCNC,CPTII,S$GLB, | Performed by: OPTOMETRIST

## 2021-12-20 PROCEDURE — 4010F ACE/ARB THERAPY RXD/TAKEN: CPT | Mod: HCNC,CPTII,S$GLB, | Performed by: OPTOMETRIST

## 2021-12-20 PROCEDURE — 99999 PR PBB SHADOW E&M-EST. PATIENT-LVL III: CPT | Mod: PBBFAC,HCNC,, | Performed by: OPTOMETRIST

## 2021-12-20 PROCEDURE — 92014 COMPRE OPH EXAM EST PT 1/>: CPT | Mod: HCNC,S$GLB,, | Performed by: OPTOMETRIST

## 2021-12-20 PROCEDURE — 92015 DETERMINE REFRACTIVE STATE: CPT | Mod: HCNC,S$GLB,, | Performed by: OPTOMETRIST

## 2021-12-20 RX ORDER — DENOSUMAB 60 MG/ML
INJECTION SUBCUTANEOUS
Status: ON HOLD | COMMUNITY
Start: 2021-08-10 | End: 2022-11-23 | Stop reason: HOSPADM

## 2021-12-22 ENCOUNTER — PATIENT MESSAGE (OUTPATIENT)
Dept: INFECTIOUS DISEASES | Facility: CLINIC | Age: 71
End: 2021-12-22
Payer: MEDICARE

## 2021-12-30 ENCOUNTER — LAB VISIT (OUTPATIENT)
Dept: LAB | Facility: OTHER | Age: 71
End: 2021-12-30
Attending: PSYCHIATRY & NEUROLOGY
Payer: MEDICARE

## 2021-12-30 DIAGNOSIS — D89.89 LIGHT CHAIN DISEASE, KAPPA TYPE: ICD-10-CM

## 2021-12-30 LAB
ALBUMIN SERPL BCP-MCNC: 3.8 G/DL (ref 3.5–5.2)
ALP SERPL-CCNC: 56 U/L (ref 55–135)
ALT SERPL W/O P-5'-P-CCNC: 67 U/L (ref 10–44)
ANION GAP SERPL CALC-SCNC: 8 MMOL/L (ref 8–16)
AST SERPL-CCNC: 69 U/L (ref 10–40)
B2 MICROGLOB SERPL-MCNC: 3.5 UG/ML (ref 0–2.5)
BASOPHILS # BLD AUTO: 0.03 K/UL (ref 0–0.2)
BASOPHILS NFR BLD: 0.5 % (ref 0–1.9)
BILIRUB SERPL-MCNC: 0.7 MG/DL (ref 0.1–1)
BUN SERPL-MCNC: 20 MG/DL (ref 8–23)
CALCIUM SERPL-MCNC: 9.6 MG/DL (ref 8.7–10.5)
CHLORIDE SERPL-SCNC: 107 MMOL/L (ref 95–110)
CO2 SERPL-SCNC: 25 MMOL/L (ref 23–29)
CREAT SERPL-MCNC: 1.1 MG/DL (ref 0.5–1.4)
DIFFERENTIAL METHOD: ABNORMAL
EOSINOPHIL # BLD AUTO: 0.2 K/UL (ref 0–0.5)
EOSINOPHIL NFR BLD: 2.6 % (ref 0–8)
ERYTHROCYTE [DISTWIDTH] IN BLOOD BY AUTOMATED COUNT: 13.2 % (ref 11.5–14.5)
ERYTHROCYTE [SEDIMENTATION RATE] IN BLOOD: 16 MM/HR (ref 0–20)
EST. GFR  (AFRICAN AMERICAN): 58 ML/MIN/1.73 M^2
EST. GFR  (NON AFRICAN AMERICAN): 50 ML/MIN/1.73 M^2
GLUCOSE SERPL-MCNC: 91 MG/DL (ref 70–110)
HCT VFR BLD AUTO: 36.2 % (ref 37–48.5)
HGB BLD-MCNC: 11.6 G/DL (ref 12–16)
IGA SERPL-MCNC: 205 MG/DL (ref 40–350)
IGG SERPL-MCNC: 1092 MG/DL (ref 650–1600)
IGM SERPL-MCNC: 37 MG/DL (ref 50–300)
IMM GRANULOCYTES # BLD AUTO: 0.04 K/UL (ref 0–0.04)
IMM GRANULOCYTES NFR BLD AUTO: 0.7 % (ref 0–0.5)
LDH SERPL L TO P-CCNC: 184 U/L (ref 110–260)
LYMPHOCYTES # BLD AUTO: 1.7 K/UL (ref 1–4.8)
LYMPHOCYTES NFR BLD: 27.6 % (ref 18–48)
MCH RBC QN AUTO: 29.3 PG (ref 27–31)
MCHC RBC AUTO-ENTMCNC: 32 G/DL (ref 32–36)
MCV RBC AUTO: 91 FL (ref 82–98)
MONOCYTES # BLD AUTO: 0.7 K/UL (ref 0.3–1)
MONOCYTES NFR BLD: 10.8 % (ref 4–15)
NEUTROPHILS # BLD AUTO: 3.6 K/UL (ref 1.8–7.7)
NEUTROPHILS NFR BLD: 57.8 % (ref 38–73)
NRBC BLD-RTO: 0 /100 WBC
PLATELET # BLD AUTO: 152 K/UL (ref 150–450)
PMV BLD AUTO: 10.7 FL (ref 9.2–12.9)
POTASSIUM SERPL-SCNC: 4.5 MMOL/L (ref 3.5–5.1)
PROT SERPL-MCNC: 6.6 G/DL (ref 6–8.4)
RBC # BLD AUTO: 3.96 M/UL (ref 4–5.4)
SODIUM SERPL-SCNC: 140 MMOL/L (ref 136–145)
WBC # BLD AUTO: 6.13 K/UL (ref 3.9–12.7)

## 2021-12-30 PROCEDURE — 86334 IMMUNOFIX E-PHORESIS SERUM: CPT | Mod: HCNC | Performed by: STUDENT IN AN ORGANIZED HEALTH CARE EDUCATION/TRAINING PROGRAM

## 2021-12-30 PROCEDURE — 80053 COMPREHEN METABOLIC PANEL: CPT | Mod: HCNC | Performed by: STUDENT IN AN ORGANIZED HEALTH CARE EDUCATION/TRAINING PROGRAM

## 2021-12-30 PROCEDURE — 84165 PROTEIN E-PHORESIS SERUM: CPT | Mod: 26,HCNC,, | Performed by: PATHOLOGY

## 2021-12-30 PROCEDURE — 84165 PROTEIN E-PHORESIS SERUM: CPT | Mod: HCNC | Performed by: STUDENT IN AN ORGANIZED HEALTH CARE EDUCATION/TRAINING PROGRAM

## 2021-12-30 PROCEDURE — 82784 ASSAY IGA/IGD/IGG/IGM EACH: CPT | Mod: 59,HCNC | Performed by: STUDENT IN AN ORGANIZED HEALTH CARE EDUCATION/TRAINING PROGRAM

## 2021-12-30 PROCEDURE — 86334 IMMUNOFIX E-PHORESIS SERUM: CPT | Mod: 26,HCNC,, | Performed by: PATHOLOGY

## 2021-12-30 PROCEDURE — 83615 LACTATE (LD) (LDH) ENZYME: CPT | Mod: HCNC | Performed by: STUDENT IN AN ORGANIZED HEALTH CARE EDUCATION/TRAINING PROGRAM

## 2021-12-30 PROCEDURE — 83520 IMMUNOASSAY QUANT NOS NONAB: CPT | Mod: HCNC | Performed by: STUDENT IN AN ORGANIZED HEALTH CARE EDUCATION/TRAINING PROGRAM

## 2021-12-30 PROCEDURE — 82232 ASSAY OF BETA-2 PROTEIN: CPT | Mod: HCNC | Performed by: STUDENT IN AN ORGANIZED HEALTH CARE EDUCATION/TRAINING PROGRAM

## 2021-12-30 PROCEDURE — 86334 PATHOLOGIST INTERPRETATION IFE: ICD-10-PCS | Mod: 26,HCNC,, | Performed by: PATHOLOGY

## 2021-12-30 PROCEDURE — 84165 PATHOLOGIST INTERPRETATION SPE: ICD-10-PCS | Mod: 26,HCNC,, | Performed by: PATHOLOGY

## 2021-12-30 PROCEDURE — 85651 RBC SED RATE NONAUTOMATED: CPT | Mod: HCNC | Performed by: STUDENT IN AN ORGANIZED HEALTH CARE EDUCATION/TRAINING PROGRAM

## 2021-12-30 PROCEDURE — 85025 COMPLETE CBC W/AUTO DIFF WBC: CPT | Mod: HCNC | Performed by: STUDENT IN AN ORGANIZED HEALTH CARE EDUCATION/TRAINING PROGRAM

## 2021-12-30 PROCEDURE — 36415 COLL VENOUS BLD VENIPUNCTURE: CPT | Mod: HCNC | Performed by: STUDENT IN AN ORGANIZED HEALTH CARE EDUCATION/TRAINING PROGRAM

## 2022-01-03 LAB
ALBUMIN SERPL ELPH-MCNC: 3.88 G/DL (ref 3.35–5.55)
ALPHA1 GLOB SERPL ELPH-MCNC: 0.26 G/DL (ref 0.17–0.41)
ALPHA2 GLOB SERPL ELPH-MCNC: 0.57 G/DL (ref 0.43–0.99)
B-GLOBULIN SERPL ELPH-MCNC: 0.76 G/DL (ref 0.5–1.1)
GAMMA GLOB SERPL ELPH-MCNC: 0.93 G/DL (ref 0.67–1.58)
INTERPRETATION SERPL IFE-IMP: NORMAL
KAPPA LC SER QL IA: 2.99 MG/DL (ref 0.33–1.94)
KAPPA LC/LAMBDA SER IA: 1.67 (ref 0.26–1.65)
LAMBDA LC SER QL IA: 1.79 MG/DL (ref 0.57–2.63)
PROT SERPL-MCNC: 6.4 G/DL (ref 6–8.4)

## 2022-01-04 LAB
PATHOLOGIST INTERPRETATION IFE: NORMAL
PATHOLOGIST INTERPRETATION SPE: NORMAL

## 2022-01-07 ENCOUNTER — OFFICE VISIT (OUTPATIENT)
Dept: HEMATOLOGY/ONCOLOGY | Facility: CLINIC | Age: 72
End: 2022-01-07
Payer: MEDICARE

## 2022-01-07 ENCOUNTER — TELEPHONE (OUTPATIENT)
Dept: ENDOSCOPY | Facility: HOSPITAL | Age: 72
End: 2022-01-07
Payer: MEDICARE

## 2022-01-07 DIAGNOSIS — A49.8 CLOSTRIDIOIDES DIFFICILE INFECTION: ICD-10-CM

## 2022-01-07 DIAGNOSIS — D89.89 LIGHT CHAIN DISEASE, KAPPA TYPE: Primary | ICD-10-CM

## 2022-01-07 PROCEDURE — 1160F RVW MEDS BY RX/DR IN RCRD: CPT | Mod: HCNC,CPTII,95, | Performed by: STUDENT IN AN ORGANIZED HEALTH CARE EDUCATION/TRAINING PROGRAM

## 2022-01-07 PROCEDURE — 99213 OFFICE O/P EST LOW 20 MIN: CPT | Mod: HCNC,95,, | Performed by: STUDENT IN AN ORGANIZED HEALTH CARE EDUCATION/TRAINING PROGRAM

## 2022-01-07 PROCEDURE — 99213 PR OFFICE/OUTPT VISIT, EST, LEVL III, 20-29 MIN: ICD-10-PCS | Mod: HCNC,95,, | Performed by: STUDENT IN AN ORGANIZED HEALTH CARE EDUCATION/TRAINING PROGRAM

## 2022-01-07 PROCEDURE — 1160F PR REVIEW ALL MEDS BY PRESCRIBER/CLIN PHARMACIST DOCUMENTED: ICD-10-PCS | Mod: HCNC,CPTII,95, | Performed by: STUDENT IN AN ORGANIZED HEALTH CARE EDUCATION/TRAINING PROGRAM

## 2022-01-07 PROCEDURE — 1157F PR ADVANCE CARE PLAN OR EQUIV PRESENT IN MEDICAL RECORD: ICD-10-PCS | Mod: HCNC,CPTII,95, | Performed by: STUDENT IN AN ORGANIZED HEALTH CARE EDUCATION/TRAINING PROGRAM

## 2022-01-07 PROCEDURE — 1157F ADVNC CARE PLAN IN RCRD: CPT | Mod: HCNC,CPTII,95, | Performed by: STUDENT IN AN ORGANIZED HEALTH CARE EDUCATION/TRAINING PROGRAM

## 2022-01-07 PROCEDURE — 1159F MED LIST DOCD IN RCRD: CPT | Mod: HCNC,CPTII,95, | Performed by: STUDENT IN AN ORGANIZED HEALTH CARE EDUCATION/TRAINING PROGRAM

## 2022-01-07 PROCEDURE — 1159F PR MEDICATION LIST DOCUMENTED IN MEDICAL RECORD: ICD-10-PCS | Mod: HCNC,CPTII,95, | Performed by: STUDENT IN AN ORGANIZED HEALTH CARE EDUCATION/TRAINING PROGRAM

## 2022-01-07 NOTE — PROGRESS NOTES
The patient location is: [place]  The chief complaint leading to consultation is: see chief complaint below    Visit type: audiovisual    Face to Face time with patient: 10  20minutes of total time spent on the encounter, which includes face to face time and non-face to face time preparing to see the patient (eg, review of tests), Obtaining and/or reviewing separately obtained history, Documenting clinical information in the electronic or other health record, Independently interpreting results (not separately reported) and communicating results to the patient/family/caregiver, or Care coordination (not separately reported).         Each patient to whom he or she provides medical services by telemedicine is:  (1) informed of the relationship between the physician and patient and the respective role of any other health care provider with respect to management of the patient; and (2) notified that he or she may decline to receive medical services by telemedicine and may withdraw from such care at any time.    Hematology- Oncology Clinic Note :      1/7/2022    RFV / chief complaint- Abnormal Lab (Abn light chain)        HPI  Pt is a 71 y.o. female who  has a past medical history of Abnormal Pap smear, Atrial fibrillation, AV block, 1st degree (7/25/2012), Cataract, Depression (7/24/2012), Facet arthritis of lumbar region (3/31/2015), Falls, Hyperlipidemia, Hypertension (7/24/2012), Neuropathy, Other specified cardiac dysrhythmias(427.89), Sleep apnea, and Syncope (7/24/2012).   Pt presents to the clinic today for abnormal light chain levels     Pt report to be in usual state of health except for diarrhea.   Spinal surgery has been postponed because of c diff, 3rd recurrence     Neuropathy- in feet, has had falls.   Sees Dr Mcduffie   Lost about 15 lbs     Other medical issues- HTN, HLD, has pacemaker in place       Reviewed past medical/surgical/social history    Past Medical History:   Diagnosis Date    Abnormal Pap  smear     Atrial fibrillation     AV block, 1st degree 7/25/2012    Cataract     Depression 7/24/2012    Facet arthritis of lumbar region 3/31/2015    Falls     3 falls in the last 6 mos--noted 6/19/19    Hyperlipidemia     Hypertension 7/24/2012    Neuropathy     Other specified cardiac dysrhythmias(427.89)     Sleep apnea     Syncope 7/24/2012      Past Surgical History:   Procedure Laterality Date    APPLICATION OF CARTILAGE GRAFT Bilateral 12/19/2019    Procedure: APPLICATION, CARTILAGE GRAFT AURICULAR JEZ;  Surgeon: Martinez Flores III, MD;  Location: [facility] OR;  Service: ENT;  Laterality: Bilateral;    CARDIAC PACEMAKER PLACEMENT  09/07/2012    [id number]    DILATION AND CURETTAGE OF UTERUS      Hx of precancerous cells?    ENDOSCOPIC ULTRASOUND OF UPPER GASTROINTESTINAL TRACT N/A 7/5/2019    Procedure: ULTRASOUND, UPPER GI TRACT, ENDOSCOPIC;  Surgeon: Kev Calderon MD;  Location: [facility] (2ND FLR);  Service: Endoscopy;  Laterality: N/A;  Pacemaker-Adam   appt confirmed-rb    MYELOGRAPHY N/A 5/4/2021    Procedure: Myelogram  CERVICAL, THORACIC AND LUMBAR;  Surgeon: [facility];  Location: [facility] 2ND FLR;  Service: Radiology;  Laterality: N/A;    NASAL SEPTOPLASTY N/A 12/19/2019    Procedure: SEPTOPLASTY, NOSE;  Surgeon: Martinez Flores III, MD;  Location: [facility];  Service: ENT;  Laterality: N/A;  FOLLOW DR SALVATORE KIMBROUGH PROTOCOL    OPEN REDUCTION AND INTERNAL FIXATION (ORIF) OF FRACTURE OF DISTAL RADIUS Left 1/24/2020    Procedure: ORIF, FRACTURE, RADIUS, DISTAL-left;  Surgeon: Ellen Moe MD;  Location: [facility];  Service: Orthopedics;  Laterality: Left;    SURGICAL REMOVAL OF NASAL TURBINATE Bilateral 12/19/2019    Procedure: EXCISION, NASAL TURBINATE;  Surgeon: Martinez Flores III, MD;  Location: [facility] OR;  Service: ENT;  Laterality: Bilateral;    TONSILLECTOMY      WISDOM TOOTH EXTRACTION        (Not in a hospital admission)    Review of  patient's allergies indicates:  No Known Allergies   Social History     Tobacco Use    Smoking status: Former Smoker     Packs/day: 0.25     Years: 15.00     Pack years: 3.75     Quit date: 1982     Years since quittin.4    Smokeless tobacco: Former User   Substance Use Topics    Alcohol use: Yes     Comment: occasional      Family History   Adopted: Yes   Problem Relation Age of Onset    Amblyopia Neg Hx     Blindness Neg Hx     Cataracts Neg Hx     Glaucoma Neg Hx     Macular degeneration Neg Hx     Retinal detachment Neg Hx           Review of Systems :  Review of Systems   Constitutional: Positive for malaise/fatigue. Negative for chills, diaphoresis, fever and weight loss.   HENT: Negative.  Negative for congestion, hearing loss, nosebleeds, sore throat and tinnitus.    Eyes: Negative.  Negative for blurred vision and discharge.   Respiratory: Negative for cough, hemoptysis, sputum production, shortness of breath and wheezing.    Cardiovascular: Negative.  Negative for chest pain, palpitations and leg swelling.   Gastrointestinal: Positive for diarrhea. Negative for abdominal pain, blood in stool, constipation, heartburn, melena, nausea and vomiting.   Genitourinary: Negative.    Musculoskeletal: Negative.  Negative for back pain, falls, joint pain and myalgias.   Skin: Negative.  Negative for itching and rash.   Neurological: Negative.  Negative for dizziness, tingling, sensory change, speech change, focal weakness, seizures, loss of consciousness, weakness and headaches.   Endo/Heme/Allergies: Negative.  Does not bruise/bleed easily.   Psychiatric/Behavioral: Negative.  Negative for depression. The patient is not nervous/anxious and does not have insomnia.                Physical Exam :  LMP 10/01/2003   Wt Readings from Last 3 Encounters:   21 68 kg (149 lb 14.6 oz)   21 69.3 kg (152 lb 12.5 oz)   21 68.1 kg (150 lb 2.1 oz)       There is no height or weight on file to  calculate BMI.      Physical Exam  Constitutional:       General: She is not in acute distress.     Appearance: Normal appearance. She is not ill-appearing.   HENT:      Head: Normocephalic and atraumatic.      Right Ear: External ear normal.      Left Ear: External ear normal.   Eyes:      General: No scleral icterus.        Right eye: No discharge.         Left eye: No discharge.   Pulmonary:      Effort: Pulmonary effort is normal. No respiratory distress.   Skin:     Coloration: Skin is not jaundiced.      Findings: No erythema or rash.   Neurological:      Mental Status: She is alert and oriented to person, place, and time. Mental status is at baseline.   Psychiatric:         Mood and Affect: Mood normal.         Speech: Speech normal.         Behavior: Behavior normal.             Current Outpatient Medications   Medication Sig Dispense Refill    aspirin (ECOTRIN) 81 MG EC tablet Take 81 mg by mouth. 1 Tablet, Delayed Release (E.C.) Oral Every day      buPROPion (WELLBUTRIN XL) 300 MG 24 hr tablet Take 300 mg by mouth once daily.       calcium-vitamin D 600 mg(1,500mg) -200 unit Tab Take 1 tablet by mouth once daily.       cholecalciferol, vitamin D3, 125 mcg (5,000 unit) Tab Take 5,000 Units by mouth once daily.      estradioL (ESTRACE) 0.01 % (0.1 mg/gram) vaginal cream Place 1 g vaginally twice a week. 42.5 g 2    FLUoxetine 40 MG capsule Take 40 mg by mouth once daily.      lisdexamfetamine (VYVANSE) 70 MG capsule Take 1 capsule (70 mg total) by mouth every morning. 30 capsule 0    losartan (COZAAR) 25 MG tablet TAKE 1 TABLET EVERY DAY 90 tablet 6    metoprolol tartrate (LOPRESSOR) 25 MG tablet TAKE 1 TABLET TWICE DAILY 180 tablet 3    pravastatin (PRAVACHOL) 20 MG tablet TAKE 1 TABLET EVERY DAY 90 tablet 3    PROLIA 60 mg/mL Syrg       sars-cov-2, covid-19, (MODERNA COVID-19) 100 mcg/0.5 ml injection Inject into the muscle. 0.5 mL 0    vancomycin (VANCOCIN) 125 MG capsule Take 1 capsule (125  mg total) by mouth 4 (four) times daily for 14 days, THEN 1 capsule (125 mg total) 3 (three) times daily for 7 days, THEN 1 capsule (125 mg total) 2 (two) times a day for 7 days, THEN 1 capsule (125 mg total) once daily for 7 days, THEN 1 capsule (125 mg total) every other day for 7 days, THEN 1 capsule (125 mg total) Every 3 (three) days for 7 days. 104 capsule 0     No current facility-administered medications for this visit.       Pertinent Diagnostic studies:      No visits with results within 1 Week(s) from this visit.   Latest known visit with results is:   Lab Visit on 12/30/2021   Component Date Value Ref Range Status    Immunofix Interp. 12/30/2021 SEE COMMENT   Final    Comment: Serum protein electrophoresis and immunofixation results should be   interpreted in clinical context in that some therapeutic agents can   result   in false positive results (example, daratumumab). Correlation with   the   patient s therapeutic regimen is required.  See pathologist's interpretation.      LD 12/30/2021 184  110 - 260 U/L Final    Results are increased in hemolyzed samples.    Sed Rate 12/30/2021 16  0 - 20 mm/Hr Final    Beta-2 Microglobulin 12/30/2021 3.5* 0.0 - 2.5 ug/mL Final    WBC 12/30/2021 6.13  3.90 - 12.70 K/uL Final    RBC 12/30/2021 3.96* 4.00 - 5.40 M/uL Final    Hemoglobin 12/30/2021 11.6* 12.0 - 16.0 g/dL Final    Hematocrit 12/30/2021 36.2* 37.0 - 48.5 % Final    MCV 12/30/2021 91  82 - 98 fL Final    MCH 12/30/2021 29.3  27.0 - 31.0 pg Final    MCHC 12/30/2021 32.0  32.0 - 36.0 g/dL Final    RDW 12/30/2021 13.2  11.5 - 14.5 % Final    Platelets 12/30/2021 152  150 - 450 K/uL Final    MPV 12/30/2021 10.7  9.2 - 12.9 fL Final    Immature Granulocytes 12/30/2021 0.7* 0.0 - 0.5 % Final    Gran # (ANC) 12/30/2021 3.6  1.8 - 7.7 K/uL Final    Immature Grans (Abs) 12/30/2021 0.04  0.00 - 0.04 K/uL Final    Comment: Mild elevation in immature granulocytes is non specific and   can be seen in  a variety of conditions including stress response,   acute inflammation, trauma and pregnancy. Correlation with other   laboratory and clinical findings is essential.      Lymph # 12/30/2021 1.7  1.0 - 4.8 K/uL Final    Mono # 12/30/2021 0.7  0.3 - 1.0 K/uL Final    Eos # 12/30/2021 0.2  0.0 - 0.5 K/uL Final    Baso # 12/30/2021 0.03  0.00 - 0.20 K/uL Final    nRBC 12/30/2021 0  0 /100 WBC Final    Gran % 12/30/2021 57.8  38.0 - 73.0 % Final    Lymph % 12/30/2021 27.6  18.0 - 48.0 % Final    Mono % 12/30/2021 10.8  4.0 - 15.0 % Final    Eosinophil % 12/30/2021 2.6  0.0 - 8.0 % Final    Basophil % 12/30/2021 0.5  0.0 - 1.9 % Final    Differential Method 12/30/2021 Automated   Final    Sodium 12/30/2021 140  136 - 145 mmol/L Final    Potassium 12/30/2021 4.5  3.5 - 5.1 mmol/L Final    Chloride 12/30/2021 107  95 - 110 mmol/L Final    CO2 12/30/2021 25  23 - 29 mmol/L Final    Glucose 12/30/2021 91  70 - 110 mg/dL Final    BUN 12/30/2021 20  8 - 23 mg/dL Final    Creatinine 12/30/2021 1.1  0.5 - 1.4 mg/dL Final    Calcium 12/30/2021 9.6  8.7 - 10.5 mg/dL Final    Total Protein 12/30/2021 6.6  6.0 - 8.4 g/dL Final    Albumin 12/30/2021 3.8  3.5 - 5.2 g/dL Final    Total Bilirubin 12/30/2021 0.7  0.1 - 1.0 mg/dL Final    Comment: For infants and newborns, interpretation of results should be based  on gestational age, weight and in agreement with clinical  observations.    Premature Infant recommended reference ranges:  Up to 24 hours.............<8.0 mg/dL  Up to 48 hours............<12.0 mg/dL  3-5 days..................<15.0 mg/dL  6-29 days.................<15.0 mg/dL      Alkaline Phosphatase 12/30/2021 56  55 - 135 U/L Final    AST 12/30/2021 69* 10 - 40 U/L Final    ALT 12/30/2021 67* 10 - 44 U/L Final    Anion Gap 12/30/2021 8  8 - 16 mmol/L Final    eGFR if  12/30/2021 58* >60 mL/min/1.73 m^2 Final    eGFR if non African American 12/30/2021 50* >60 mL/min/1.73 m^2 Final     Comment: Calculation used to obtain the estimated glomerular filtration  rate (eGFR) is the CKD-EPI equation.       Kappa Free Light Chains 12/30/2021 2.99* 0.33 - 1.94 mg/dL Final    Lambda Free Light Chains 12/30/2021 1.79  0.57 - 2.63 mg/dL Final    Kappa/Lambda FLC Ratio 12/30/2021 1.67* 0.26 - 1.65 Final    Comment: Undetected antigen excess is a rare event but cannot   be excluded. If these free light chain results do not   agree with other clinical or laboratory findings or   if the sample is from a patient that has previously   demonstrated antigen excess, discuss with the testing   laboratory.   Results should always be interpreted in conjunction   with other laboratory tests and clinical evidence.      IgG 12/30/2021 1092  650 - 1600 mg/dL Final    IgG Cord Blood Reference Range: 650-1600 mg/dL.    IgA 12/30/2021 205  40 - 350 mg/dL Final    IgA Cord Blood Reference Range: <5 mg/dL.    IgM 12/30/2021 37* 50 - 300 mg/dL Final    IgM Cord Blood Reference Range: <25 mg/dL.    Protein, Serum 12/30/2021 6.4  6.0 - 8.4 g/dL Final    Comment: Serum protein electrophoresis and immunofixation results should be   interpreted in clinical context in that some therapeutic agents can   result   in false positive results (example, daratumumab). Correlation with   the   patient s therapeutic regimen is required.      Albumin 12/30/2021 3.88  3.35 - 5.55 g/dL Final    Alpha-1 12/30/2021 0.26  0.17 - 0.41 g/dL Final    Alpha-2 12/30/2021 0.57  0.43 - 0.99 g/dL Final    Beta 12/30/2021 0.76  0.50 - 1.10 g/dL Final    Gamma 12/30/2021 0.93  0.67 - 1.58 g/dL Final    Pathologist Interpretation SPE 12/30/2021 REVIEWED   Final    Comment:   Electronically reviewed and signed by:  Nini Chun M.D.  Signed on 01/04/22 at 12:43  Normal total protein, normal pattern.      Pathologist Interpretation [place] 12/30/2021 REVIEWED   Final    Comment:   Electronically reviewed and signed by:  Nini Chun  M.D.  Signed on 01/04/22 at 12:43  No monoclonal peaks identified.                   Oncology History    No history exists.     Assessment :       1. Light chain disease, kappa type    2. Clostridioides difficile infection        Plan :     Elevated kappa light chain, labs done as part of work up for neuropathy     8/25/2021 16:36   [place] Free Light Chains 3.44 (H)   Lambda Free Light Chains 1.93   Kappa/Lambda FLC Ratio 1.78 (H)     UPEP- negative. Repeat k/l ratio minimally elevated.   RTC 1 year with repeat labs    C diff   -on oral vanc  -per pcp/ID  -pt has an appt with gi coming up      Problem List Items Addressed This Visit    None     Visit Diagnoses     Light chain disease, kappa type    -  Primary            Electronically signed by Xiao Fan    [facility] Appointments   Date Time Provider Department Center   1/11/2022  9:00 AM Anna Kimura, PT [facility] OB THER [facility]   1/11/2022  3:00 PM Nicolette Cheek MD [facility] NEUROS8 [facility]   1/13/2022 10:00 AM Gerson Smiley MD [facility] GASTRO [facility]   1/14/2022  9:20 AM PACEMAKER, ICD [facility] ARRHPRO [facility]   2/2/2022 10:15 AM Margarita Mcduffie MD [facility] NEURO [facility]   2/15/2022 10:45 AM INJECTION [facility] AMB INF [facility]   2/22/2022  9:30 AM Patricia Rao DPM [facility]   3/8/2022  2:30 PM Ines Aguilera MD [facility] OPHTHAL [facility]   3/21/2022 10:30 AM Jessica Messer NP [facility] UROGYN [facility]           This note was created with voice recognition software.  Grammatical, syntax and spelling errors may be inevitable.

## 2022-01-10 ENCOUNTER — PATIENT MESSAGE (OUTPATIENT)
Dept: ENDOSCOPY | Facility: HOSPITAL | Age: 72
End: 2022-01-10
Payer: MEDICARE

## 2022-01-11 ENCOUNTER — OFFICE VISIT (OUTPATIENT)
Dept: GASTROENTEROLOGY | Facility: CLINIC | Age: 72
End: 2022-01-11
Payer: MEDICARE

## 2022-01-11 ENCOUNTER — OFFICE VISIT (OUTPATIENT)
Dept: NEUROSURGERY | Facility: CLINIC | Age: 72
End: 2022-01-11
Payer: MEDICARE

## 2022-01-11 VITALS
DIASTOLIC BLOOD PRESSURE: 76 MMHG | WEIGHT: 160.69 LBS | SYSTOLIC BLOOD PRESSURE: 155 MMHG | HEART RATE: 68 BPM | BODY MASS INDEX: 24.35 KG/M2 | HEIGHT: 68 IN

## 2022-01-11 DIAGNOSIS — Z86.19 HISTORY OF CLOSTRIDIOIDES DIFFICILE COLITIS: ICD-10-CM

## 2022-01-11 DIAGNOSIS — K86.2 PANCREAS CYST: Primary | ICD-10-CM

## 2022-01-11 DIAGNOSIS — G95.9 CERVICAL MYELOPATHY: Primary | ICD-10-CM

## 2022-01-11 DIAGNOSIS — A04.71 RECURRENT CLOSTRIDIOIDES DIFFICILE DIARRHEA: ICD-10-CM

## 2022-01-11 PROCEDURE — 99499 UNLISTED E&M SERVICE: CPT | Mod: HCNC,S$GLB,, | Performed by: INTERNAL MEDICINE

## 2022-01-11 PROCEDURE — 99204 PR OFFICE/OUTPT VISIT, NEW, LEVL IV, 45-59 MIN: ICD-10-PCS | Mod: HCNC,S$GLB,, | Performed by: INTERNAL MEDICINE

## 2022-01-11 PROCEDURE — 3077F PR MOST RECENT SYSTOLIC BLOOD PRESSURE >= 140 MM HG: ICD-10-PCS | Mod: HCNC,CPTII,S$GLB, | Performed by: INTERNAL MEDICINE

## 2022-01-11 PROCEDURE — 1157F PR ADVANCE CARE PLAN OR EQUIV PRESENT IN MEDICAL RECORD: ICD-10-PCS | Mod: HCNC,CPTII,95, | Performed by: NEUROLOGICAL SURGERY

## 2022-01-11 PROCEDURE — 1157F PR ADVANCE CARE PLAN OR EQUIV PRESENT IN MEDICAL RECORD: ICD-10-PCS | Mod: HCNC,CPTII,S$GLB, | Performed by: INTERNAL MEDICINE

## 2022-01-11 PROCEDURE — 3078F PR MOST RECENT DIASTOLIC BLOOD PRESSURE < 80 MM HG: ICD-10-PCS | Mod: HCNC,CPTII,S$GLB, | Performed by: INTERNAL MEDICINE

## 2022-01-11 PROCEDURE — 99204 OFFICE O/P NEW MOD 45 MIN: CPT | Mod: HCNC,S$GLB,, | Performed by: INTERNAL MEDICINE

## 2022-01-11 PROCEDURE — 3078F DIAST BP <80 MM HG: CPT | Mod: HCNC,CPTII,S$GLB, | Performed by: INTERNAL MEDICINE

## 2022-01-11 PROCEDURE — 1126F AMNT PAIN NOTED NONE PRSNT: CPT | Mod: HCNC,CPTII,S$GLB, | Performed by: INTERNAL MEDICINE

## 2022-01-11 PROCEDURE — 1159F MED LIST DOCD IN RCRD: CPT | Mod: HCNC,CPTII,S$GLB, | Performed by: INTERNAL MEDICINE

## 2022-01-11 PROCEDURE — 3008F PR BODY MASS INDEX (BMI) DOCUMENTED: ICD-10-PCS | Mod: HCNC,CPTII,S$GLB, | Performed by: INTERNAL MEDICINE

## 2022-01-11 PROCEDURE — 99214 PR OFFICE/OUTPT VISIT, EST, LEVL IV, 30-39 MIN: ICD-10-PCS | Mod: HCNC,95,, | Performed by: NEUROLOGICAL SURGERY

## 2022-01-11 PROCEDURE — 1157F ADVNC CARE PLAN IN RCRD: CPT | Mod: HCNC,CPTII,95, | Performed by: NEUROLOGICAL SURGERY

## 2022-01-11 PROCEDURE — 3288F PR FALLS RISK ASSESSMENT DOCUMENTED: ICD-10-PCS | Mod: HCNC,CPTII,S$GLB, | Performed by: INTERNAL MEDICINE

## 2022-01-11 PROCEDURE — 1101F PT FALLS ASSESS-DOCD LE1/YR: CPT | Mod: HCNC,CPTII,S$GLB, | Performed by: INTERNAL MEDICINE

## 2022-01-11 PROCEDURE — 99499 RISK ADDL DX/OHS AUDIT: ICD-10-PCS | Mod: HCNC,S$GLB,, | Performed by: INTERNAL MEDICINE

## 2022-01-11 PROCEDURE — 1157F ADVNC CARE PLAN IN RCRD: CPT | Mod: HCNC,CPTII,S$GLB, | Performed by: INTERNAL MEDICINE

## 2022-01-11 PROCEDURE — 99214 OFFICE O/P EST MOD 30 MIN: CPT | Mod: HCNC,95,, | Performed by: NEUROLOGICAL SURGERY

## 2022-01-11 PROCEDURE — 3288F FALL RISK ASSESSMENT DOCD: CPT | Mod: HCNC,CPTII,S$GLB, | Performed by: INTERNAL MEDICINE

## 2022-01-11 PROCEDURE — 3008F BODY MASS INDEX DOCD: CPT | Mod: HCNC,CPTII,S$GLB, | Performed by: INTERNAL MEDICINE

## 2022-01-11 PROCEDURE — 1159F PR MEDICATION LIST DOCUMENTED IN MEDICAL RECORD: ICD-10-PCS | Mod: HCNC,CPTII,S$GLB, | Performed by: INTERNAL MEDICINE

## 2022-01-11 PROCEDURE — 3077F SYST BP >= 140 MM HG: CPT | Mod: HCNC,CPTII,S$GLB, | Performed by: INTERNAL MEDICINE

## 2022-01-11 PROCEDURE — 99999 PR PBB SHADOW E&M-EST. PATIENT-LVL V: ICD-10-PCS | Mod: PBBFAC,HCNC,, | Performed by: INTERNAL MEDICINE

## 2022-01-11 PROCEDURE — 1126F PR PAIN SEVERITY QUANTIFIED, NO PAIN PRESENT: ICD-10-PCS | Mod: HCNC,CPTII,S$GLB, | Performed by: INTERNAL MEDICINE

## 2022-01-11 PROCEDURE — 1101F PR PT FALLS ASSESS DOC 0-1 FALLS W/OUT INJ PAST YR: ICD-10-PCS | Mod: HCNC,CPTII,S$GLB, | Performed by: INTERNAL MEDICINE

## 2022-01-11 PROCEDURE — 99999 PR PBB SHADOW E&M-EST. PATIENT-LVL V: CPT | Mod: PBBFAC,HCNC,, | Performed by: INTERNAL MEDICINE

## 2022-01-11 NOTE — PROGRESS NOTES
"[facility] Consultation     Reason for Consult:  The primary encounter diagnosis was Pancreas cyst. Diagnoses of History of Clostridioides difficile colitis and Recurrent Clostridioides difficile diarrhea were also pertinent to this visit.    PCP:   Iris Blue   [address] / [place] [address]    Referring MD:  Colton Maradiaga Do  [address] [address]    HPI:  This is a 71 y.o. female with HTN, MDD, sick sinus syndrome with 1st degree AV block with pacemaker, CEDRIC who presents to GI clinic for recurrent Cdiff.    She says she was scheduled for spine surgery in June 2021. Around this time, she was bit by her cat and was started on an antibiotic. She then developed diarrhea and tested positive for Cdiff. She was treated with PO vancomycin.    She was admitted to the hospital in July 2021 for diarrhea. She was discharged with a PO vancomycin taper. CT abdomen at that time showed colitis.    In October, after completing the taper, she had recurrent diarrhea and tested positive for Cdiff. She was treated with an additional Vancomycin taper.    Around December 9/10, she developed diarrhea again. She contacted infectious disease. She was prescribed an additional vancomycin taper. She is currently still on this taper. Her stools are currently soft, "mustard-colored". Denies any rectal bleeding. She is having one bowel movement per day.    In between her Cdiff episodes/treatment, she had soft stools.         ROS:  Constitutional: No fevers, chills, No weight loss  ENT: No allergies  CV: No chest pain  Pulm: No cough, No shortness of breath  Ophtho: No vision changes  GI: see HPI  Derm: No rash  Heme: No lymphadenopathy, No bruising  MSK: No arthritis  : No dysuria, No hematuria  Endo: No hot or cold intolerance  Neuro: No syncope, No seizure  Psych: No anxiety, No depression    Medical History:  has a past medical history of Abnormal Pap smear, Atrial " fibrillation, AV block, 1st degree (7/25/2012), Cataract, Depression (7/24/2012), Facet arthritis of lumbar region (3/31/2015), Falls, Hyperlipidemia, Hypertension (7/24/2012), Neuropathy, Other specified cardiac dysrhythmias(427.89), Sleep apnea, and Syncope (7/24/2012).    Surgical History:  has a past surgical history that includes Tonsillectomy; Dilation and curettage of uterus; Cardiac pacemaker placement (09/07/2012); Endoscopic ultrasound of upper gastrointestinal tract (N/A, 7/5/2019); [place] tooth extraction; Nasal septoplasty (N/A, 12/19/2019); Application of cartilage graft (Bilateral, 12/19/2019); Surgical removal of nasal turbinate (Bilateral, 12/19/2019); Open reduction and internal fixation (ORIF) of fracture of distal radius (Left, 1/24/2020); and Myelography (N/A, 5/4/2021).    Family History: family history is not on file. She was adopted..   No contributory family history     Social History:  reports that she quit smoking about 39 years ago. She has a 3.75 pack-year smoking history. She has quit using smokeless tobacco. She reports current alcohol use. She reports that she does not use drugs.    Review of patient's allergies indicates:  No Known Allergies    Current Outpatient Rx   Medication Sig Dispense Refill    aspirin (ECOTRIN) 81 MG EC tablet Take 81 mg by mouth. 1 Tablet, Delayed Release (E.C.) Oral Every day      buPROPion (WELLBUTRIN XL) 300 MG 24 hr tablet Take 300 mg by mouth once daily.       calcium-vitamin D 600 mg(1,500mg) -200 unit Tab Take 1 tablet by mouth once daily.       cholecalciferol, vitamin D3, 125 mcg (5,000 unit) Tab Take 5,000 Units by mouth once daily.      estradioL (ESTRACE) 0.01 % (0.1 mg/gram) vaginal cream Place 1 g vaginally twice a week. 42.5 g 2    FLUoxetine 40 MG capsule Take 40 mg by mouth once daily.      lisdexamfetamine (VYVANSE) 70 MG capsule Take 1 capsule (70 mg total) by mouth every morning. 30 capsule 0    losartan (COZAAR) 25 MG tablet TAKE  "1 TABLET EVERY DAY 90 tablet 6    metoprolol tartrate (LOPRESSOR) 25 MG tablet TAKE 1 TABLET TWICE DAILY 180 tablet 3    pravastatin (PRAVACHOL) 20 MG tablet TAKE 1 TABLET EVERY DAY 90 tablet 3    PROLIA 60 mg/mL Syrg       sars-cov-2, covid-19, (MODERNA COVID-19) 100 mcg/0.5 ml injection Inject into the muscle. 0.5 mL 0    vancomycin (VANCOCIN) 125 MG capsule Take 1 capsule (125 mg total) by mouth 4 (four) times daily for 14 days, THEN 1 capsule (125 mg total) 3 (three) times daily for 7 days, THEN 1 capsule (125 mg total) 2 (two) times a day for 7 days, THEN 1 capsule (125 mg total) once daily for 7 days, THEN 1 capsule (125 mg total) every other day for 7 days, THEN 1 capsule (125 mg total) Every 3 (three) days for 7 days. 104 capsule 0       Objective Findings:    Vital Signs:  BP (!) 155/76   Pulse 68   Ht 5' 8" (1.727 m)   Wt 72.9 kg (160 lb 11.5 oz)   LMP 10/01/2003   BMI 24.44 kg/m²   Body mass index is 24.44 kg/m².    Physical Exam:  General Appearance: well-appearing, NAD  Head:   Normocephalic, without obvious abnormality  Eyes:    No scleral icterus, EOMI  ENT: Neck supple; moist mucus membranes  Lungs: clear to auscultation bilaterally.  Heart:  regular rate and rhythm, S1, S2 normal, no murmurs heard  Abdomen: soft, non-tender, non-distended with bowel sounds in all four quadrants. No hepatosplenomegaly, ascites, or palpable masses  Extremities: 2+ pulses, no clubbing, cyanosis or edema  Skin: No visible rash  Neurologic: no focal motor deficits      Labs:  Lab Results   Component Value Date    WBC 6.13 12/30/2021    HGB 11.6 (L) 12/30/2021    HCT 36.2 (L) 12/30/2021     12/30/2021    CHOL 162 07/13/2021    TRIG 88 07/13/2021    HDL 47 07/13/2021    ALT 67 (H) 12/30/2021    AST 69 (H) 12/30/2021     12/30/2021    K 4.5 12/30/2021     12/30/2021    CREATININE 1.1 12/30/2021    BUN 20 12/30/2021    CO2 25 12/30/2021    TSH 2.041 06/10/2021    INR 1.0 06/10/2021    HGBA1C 5.4 " 06/10/2021    HGBA1C 5.4 06/10/2021       IMAGING:  CT ABDOMEN 7/2021  1. Wall thickening and inflammation involving the near entirety of the large bowel although most significantly involves the distal large bowel concerning for long segment infectious or inflammatory colitis.  Given with stool within the lumen, there is likely subsequent diarrheal illness.  Correlation is advised in this patient with provided history of C diff..  Follow-up colonoscopy recommended to exclude underlying mass.  2. Findings suggesting hepatic steatosis, correlation with LFTs recommended.  3. Please see above for several additional findings.    PROCEDURES:  EUS 2019  1. Wall thickening and inflammation involving the near entirety of the large bowel although most significantly involves the distal large bowel concerning for long segment infectious or inflammatory colitis.  Given with stool within the lumen, there is likely subsequent diarrheal illness.  Correlation is advised in this patient with provided history of C diff..  Follow-up colonoscopy recommended to exclude underlying mass.  2. Findings suggesting hepatic steatosis, correlation with LFTs recommended.  3. Please see above for several additional findings.    COLONOSCOPY 2015  Impression:           - The entire examined colon is normal.                         - The examined portion of the ileum was normal.                         - No specimens collected.     Assessment:  This is a 71 y.o. female with HTN, MDD, sick sinus syndrome with 1st degree AV block with pacemaker, CEDRIC who presents to GI clinic for recurrent Cdiff.    Recommendations:  RECURRENT CDIFF  Discussed patient's case with colleague, Alberto Smith. FMT via colonoscopy currently not available through [facility]. Instead, offered patient consultation with Shena Garcia within ID department for clinical trial for rebiotics enema - patient agreed and would like to have consultation to determine if this is something she  would like to proceed with. I have sent a message to Shena Garcia this afternoon.   Continue vancomycin pulse taper via Dr. Maradiaga for now  Colonoscopy deferred for now given active Cdiff - colitis on imaging likely from known infection and patient not due for colorectal cancer screening (last colonoscopy 2015 normal, recommended repeat 10 years)    PANCREAS CYST  EUS 2019 with cyst lesion  CT pancreas ordered for surveillance and results cc'd to Dr. Calderon (unable to have MRI due to pacemaker)      Order summary:  Orders Placed This Encounter    CT Abdomen Pelvis With Contrast         Thank you so much for allowing me to participate in the care of Manisha Smiley MD  Gastroenterology   [facility]

## 2022-01-11 NOTE — PROGRESS NOTES
"Neurosurgery  Follow up     SUBJECTIVE:     Chief Complaint: "here for neck evaluation"     History of Present Illness:  Manisha Wynne is a 71 y.o. female (former  at [employer] who presents as a referral from Dr. Mcduffie for evaluation of cervical myelopathy. The patient reports that she has had several falls from [id number], which prompted her to seek neurologic evaluation. The falls have resulted in injuries, including breaking her wrist. She also has numbness, tingling, and burning in her feet. She denies pain in her neck, but endorses in her mid- and low back. Her pain is 10/10 in the legs, 10/10 in the back, and 0/10 in the neck and arms. The pain is intermittent. It is made worse by sitting. It improves with standing. She denies any change in bowel/bladder habit.     She endorses handwriting changes, dropping objects, and balance/gait difficulty. She denies fine motor changes. She has not seen pain management for her neck, but she is seeing Dr. Booker for "sciatica."     CT of the cervical spine (the patient has a non-MRI-conditional compatible pacemaker in place) was obtained and personally reviewed, demonstrating significant stenosis at C5-C6>C4-C5. Flex ex shows some mild instability of C3 on C4.     She lives alone with her tuxedo cats. Her family is in [place] and [facility]. She does have friends nearby who will help to care for her in perioperative periods. She takes ASA 81 daily.     As of 5/6/21, the patient had the myelogram as requested. This is personally reviewed and demonstrates significant cord compression and OPLL. There is some stenosis at L4-L5 as well.     The patient reports that she has been well. She has had no falls. She has been doing PT for her sciatica, which she has found helpful. She does note that her tremor, which may be progressing slightly, is now interfering with her applying makeup at times.     As of 5/25/21, the patient reports that she has not had any significant " changes. She presents today with a list of questions, together with friend Kavya Roberts on the phone. She also endorses a several year history of urinary incontinence (episodes occur overnight or with coughing) for which she was going to participate in pelvic floor rehab and see urogyn; however, this was interrupted by Covid.     As of 1/11/22, the patient reports that she has had ongoing C. Diff since we scheduled surgery. She was seen about a potential colonoscopy today, but was advised against proceeding. She is due for a CT of the abdomen/pelvis next week to look at the pancreas. She is also being considered for a fecal transplant. There is also a clinical trial going on in which she's interested.     Dr. Maradiaga of ID has been quarterbacking the C. Diff response. The patient is frustrated by her lack of improvement.     She is less fatigued now than when she initially became sick, but she does not feel she has yet recovered to her baseline. She is also being recommended for cataract surgery and pacemaker generator replacement in about 13 months.      Review of patient's allergies indicates:  No Known Allergies    Current Outpatient Medications   Medication Sig Dispense Refill    aspirin (ECOTRIN) 81 MG EC tablet Take 81 mg by mouth. 1 Tablet, Delayed Release (E.C.) Oral Every day      buPROPion (WELLBUTRIN XL) 300 MG 24 hr tablet Take 300 mg by mouth once daily.       calcium-vitamin D 600 mg(1,500mg) -200 unit Tab Take 1 tablet by mouth once daily.       cholecalciferol, vitamin D3, 125 mcg (5,000 unit) Tab Take 5,000 Units by mouth once daily.      estradioL (ESTRACE) 0.01 % (0.1 mg/gram) vaginal cream Place 1 g vaginally twice a week. 42.5 g 2    FLUoxetine 40 MG capsule Take 40 mg by mouth once daily.      lisdexamfetamine (VYVANSE) 70 MG capsule Take 1 capsule (70 mg total) by mouth every morning. 30 capsule 0    losartan (COZAAR) 25 MG tablet TAKE 1 TABLET EVERY DAY 90 tablet 6    metoprolol  tartrate (LOPRESSOR) 25 MG tablet TAKE 1 TABLET TWICE DAILY 180 tablet 3    pravastatin (PRAVACHOL) 20 MG tablet TAKE 1 TABLET EVERY DAY 90 tablet 3    PROLIA 60 mg/mL Syrg       sars-cov-2, covid-19, (MODERNA COVID-19) 100 mcg/0.5 ml injection Inject into the muscle. 0.5 mL 0    vancomycin (VANCOCIN) 125 MG capsule Take 1 capsule (125 mg total) by mouth 4 (four) times daily for 14 days, THEN 1 capsule (125 mg total) 3 (three) times daily for 7 days, THEN 1 capsule (125 mg total) 2 (two) times a day for 7 days, THEN 1 capsule (125 mg total) once daily for 7 days, THEN 1 capsule (125 mg total) every other day for 7 days, THEN 1 capsule (125 mg total) Every 3 (three) days for 7 days. 104 capsule 0     No current facility-administered medications for this visit.       Past Medical History:   Diagnosis Date    Abnormal Pap smear     Atrial fibrillation     AV block, 1st degree 7/25/2012    Cataract     Depression 7/24/2012    Facet arthritis of lumbar region 3/31/2015    Falls     3 falls in the last 6 mos--noted 6/19/19    Hyperlipidemia     Hypertension 7/24/2012    Neuropathy     Other specified cardiac dysrhythmias(427.89)     Sleep apnea     Syncope 7/24/2012     Past Surgical History:   Procedure Laterality Date    APPLICATION OF CARTILAGE GRAFT Bilateral 12/19/2019    Procedure: APPLICATION, CARTILAGE GRAFT AURICULAR JEZ;  Surgeon: Martinez Flores III, MD;  [facility];  Service: ENT;  Laterality: Bilateral;    CARDIAC PACEMAKER PLACEMENT  09/07/2012    [id number]    DILATION AND CURETTAGE OF UTERUS      Hx of precancerous cells?    ENDOSCOPIC ULTRASOUND OF UPPER GASTROINTESTINAL TRACT N/A 7/5/2019    Procedure: ULTRASOUND, UPPER GI TRACT, ENDOSCOPIC;  Surgeon: Kev Calderon MD;  Location: [facility] ([address] [place] [address]);  Service: Endoscopy;  Laterality: N/A;  Pacemaker-Adam   appt confirmed-rb    MYELOGRAPHY N/A 5/4/2021    Procedure: Myelogram  CERVICAL, THORACIC AND  LUMBAR;  Surgeon: Dosc Diagnostic Provider;  Location: [facility] OR 2ND FLR;  Service: Radiology;  Laterality: N/A;    NASAL SEPTOPLASTY N/A 2019    Procedure: SEPTOPLASTY, NOSE;  Surgeon: Martinez Flores III, MD;  Location: [facility];  Service: ENT;  Laterality: N/A;  FOLLOW DR SALVATORE KIMBROUGH PROTOCOL    OPEN REDUCTION AND INTERNAL FIXATION (ORIF) OF FRACTURE OF DISTAL RADIUS Left 2020    Procedure: ORIF, FRACTURE, RADIUS, DISTAL-left;  Surgeon: Ellen Moe MD;  Location[facility];  Service: Orthopedics;  Laterality: Left;    SURGICAL REMOVAL OF NASAL TURBINATE Bilateral 2019    Procedure: EXCISION, NASAL TURBINATE;  Surgeon: Martinez Flores III, MD;  Location: [facility];  Service: ENT;  Laterality: Bilateral;    TONSILLECTOMY      WISDOM TOOTH EXTRACTION       Family History    None       Social History     Socioeconomic History    Marital status:    Occupational History    Occupation: /Rabbi     Employer: [employer]   Tobacco Use    Smoking status: Former Smoker     Packs/day: 0.25     Years: 15.00     Pack years: 3.75     Quit date: 1982     Years since quittin.4    Smokeless tobacco: Former User   Substance and Sexual Activity    Alcohol use: Yes     Comment: occasional    Drug use: No    Sexual activity: Not Currently     Social Determinants of Health     Financial Resource Strain: Low Risk     Difficulty of Paying Living Expenses: Not very hard   Food Insecurity: No Food Insecurity    Worried About Running Out of Food in the Last Year: Never true    Ran Out of Food in the Last Year: Never true   Transportation Needs: No Transportation Needs    Lack of Transportation (Medical): No    Lack of Transportation (Non-Medical): No   Physical Activity: Insufficiently Active    Days of Exercise per Week: 2 days    Minutes of Exercise per Session: 60 min   Stress: Stress Concern Present    Feeling of Stress : To some extent   Social Connections:  Moderately Integrated    Frequency of Communication with Friends and Family: More than three times a week    Frequency of Social Gatherings with Friends and Family: Three times a week    Attends Episcopal Services: More than 4 times per year    Active Member of Clubs or Organizations: Yes    Attends Club or Organization Meetings: More than 4 times per year    Marital Status:    Housing Stability: Low Risk     Unable to Pay for Housing in the Last Year: No    Number of Places Lived in the Last Year: 1    Unstable Housing in the Last Year: No       Review of Systems   Constitutional: Negative for fever.        Weight loss   HENT: Negative for nosebleeds.    Eyes: Positive for visual disturbance.   Respiratory: Negative for shortness of breath.    Cardiovascular: Negative for chest pain.   Gastrointestinal: Negative for constipation.   Endocrine: Negative for cold intolerance.   Genitourinary: Negative for difficulty urinating.   Musculoskeletal: Positive for back pain and gait problem. Negative for neck pain.   Skin: Negative for color change.   Neurological: Negative for headaches.   Hematological: Does not bruise/bleed easily.   Psychiatric/Behavioral: Positive for dysphoric mood. The patient is nervous/anxious.        OBJECTIVE:     Vital Signs     There is no height or weight on file to calculate BMI.    Physical Exam:    Audio only     Diagnostic Results:  No new     ASSESSMENT/PLAN:     Manisha Wynne is a 71 y.o. female with cervical stenosis and myelopathy. She has an MRI-non-compatible pacemaker in place. This will be due for replacement within the next 13 months.     Given the results of the myelogram, I expect that she would benefit from C3-C6 laminectomy and fusion, with undercutting of the C2 lamina. We had a lengthy and pleasant conversation about this procedure, including the associated hospitalization. We also discussed appropriate cat care modifications and obtaining a shower  chair for the perioperative period.     Risks include, but are not limited to, bleeding, pain, infection, scarring/poor wound healing, need for further/repeat procedure, death, paralysis, blindness, pseudoarthrosis, C5 palsy, stroke/damage to major blood vessels, leak of cerebrospinal fluid, and hardware-related complications. Informed consent was obtained. We discussed that she should expect significant pain in the immediate postop period. We again discussed that the goal of a cervical surgery is to prevent worsening, not necessarily to be able to guarantee improved function. She expressed understanding.     We will have her see Dr. Blue or Dr. Archer for medical optimization. She will need to be off of ASA for one week prior to surgery. She would like to have surgery after her C. Diff has resolved, a plan with which I agree.     I have cautioned her not to climb ladders or otherwise put herself at increased risk for fall. She expresses understanding.     Recommended that she follow up with Dr. Blue to help coordinate care amongst the various services.     I have encouraged her to contact the clinic in interim with any questions, concerns, or adverse clinical change.     The patient location is: at home   The chief complaint leading to consultation is: cervical myelopathy     Visit type: audio only    Face to Face time with patient: 28  49 minutes of total time spent on the encounter, which includes face to face time and non-face to face time preparing to see the patient (eg, review of tests), Obtaining and/or reviewing separately obtained history, Documenting clinical information in the electronic or other health record, Independently interpreting results (not separately reported) and communicating results to the patient/family/caregiver, or Care coordination (not separately reported).         Each patient to whom he or she provides medical services by telemedicine is:  (1) informed of the  relationship between the physician and patient and the respective role of any other health care provider with respect to management of the patient; and (2) notified that he or she may decline to receive medical services by telemedicine and may withdraw from such care at any time.    Notes: Note generated with voice recognition software; please excuse any typographical errors.

## 2022-01-12 ENCOUNTER — TELEPHONE (OUTPATIENT)
Dept: INFECTIOUS DISEASES | Facility: CLINIC | Age: 72
End: 2022-01-12
Payer: MEDICARE

## 2022-01-12 NOTE — TELEPHONE ENCOUNTER
Infectious Disease Research Referral:        Ms. Manisha Wynne is a 71 y.o. female who was referred by Dr. Smiley for an FMT. Mrs. Wynne has a history of recurrent C.Diff. Dr. Smith is unable to obtain product for FMT from [facility] at this time. He referred patient to ID Research to contact to assess eligibility and interest in enrolling in studies currently open.      Ms. Wynne contacted to review history of C.Diff and past medical history.      After discussion with patient determined she meets criteria for screening for [employer] [id number] open label FMT study that provides a FMT by enema for treatment of recurrent C.Diff. Ms. Wynne will come to clinic on 1/20/22 for screening visit with plans to administer FMT enema if all screening related procedure complete and meets Inclusion/Exclusion criteria. Discussed with patient that if she does not meet criteria for the study can offer to give enema through [facility] program off study.     Reviewed plan for visit on 1/20/22. If any questions or concerns patient directed to call clinic at [phone].

## 2022-01-14 ENCOUNTER — CLINICAL SUPPORT (OUTPATIENT)
Dept: CARDIOLOGY | Facility: HOSPITAL | Age: 72
End: 2022-01-14
Attending: INTERNAL MEDICINE
Payer: MEDICARE

## 2022-01-14 DIAGNOSIS — I49.5 SSS (SICK SINUS SYNDROME): ICD-10-CM

## 2022-01-14 PROCEDURE — 93280 CARDIAC DEVICE CHECK - IN CLINIC & HOSPITAL: ICD-10-PCS | Mod: 26,,, | Performed by: INTERNAL MEDICINE

## 2022-01-14 PROCEDURE — 93280 PM DEVICE PROGR EVAL DUAL: CPT | Mod: 26,,, | Performed by: INTERNAL MEDICINE

## 2022-01-14 PROCEDURE — 93280 PM DEVICE PROGR EVAL DUAL: CPT | Mod: HCNC

## 2022-01-19 ENCOUNTER — HOSPITAL ENCOUNTER (OUTPATIENT)
Dept: RADIOLOGY | Facility: OTHER | Age: 72
Discharge: HOME | End: 2022-01-19
Attending: INTERNAL MEDICINE
Payer: MEDICARE

## 2022-01-19 DIAGNOSIS — K86.2 PANCREAS CYST: ICD-10-CM

## 2022-01-19 PROCEDURE — 74178 CT ABD&PLV WO CNTR FLWD CNTR: CPT | Mod: 26,,, | Performed by: INTERNAL MEDICINE

## 2022-01-19 PROCEDURE — 74178 CT ABD&PLV WO CNTR FLWD CNTR: CPT | Mod: TC

## 2022-01-19 PROCEDURE — 25500020 PHARM REV CODE 255: Performed by: INTERNAL MEDICINE

## 2022-01-19 PROCEDURE — 74178 CT ABDOMEN PELVIS W WO CONTRAST: ICD-10-PCS | Mod: 26,,, | Performed by: INTERNAL MEDICINE

## 2022-01-19 RX ADMIN — IOHEXOL 75 ML: 350 INJECTION, SOLUTION INTRAVENOUS at 01:01

## 2022-01-20 ENCOUNTER — OFFICE VISIT (OUTPATIENT)
Dept: INFECTIOUS DISEASES | Facility: CLINIC | Age: 72
End: 2022-01-20
Payer: MEDICARE

## 2022-01-20 VITALS
RESPIRATION RATE: 16 BRPM | WEIGHT: 156.31 LBS | HEART RATE: 60 BPM | BODY MASS INDEX: 23.77 KG/M2 | TEMPERATURE: 98 F | SYSTOLIC BLOOD PRESSURE: 130 MMHG | DIASTOLIC BLOOD PRESSURE: 84 MMHG

## 2022-01-20 DIAGNOSIS — I49.5 SSS (SICK SINUS SYNDROME): Chronic | ICD-10-CM

## 2022-01-20 DIAGNOSIS — F32.A DEPRESSION, UNSPECIFIED DEPRESSION TYPE: ICD-10-CM

## 2022-01-20 DIAGNOSIS — Z95.0 CARDIAC PACEMAKER IN SITU: ICD-10-CM

## 2022-01-20 DIAGNOSIS — N18.30 STAGE 3 CHRONIC KIDNEY DISEASE, UNSPECIFIED WHETHER STAGE 3A OR 3B CKD: ICD-10-CM

## 2022-01-20 DIAGNOSIS — Z00.6 RESEARCH STUDY PATIENT: ICD-10-CM

## 2022-01-20 DIAGNOSIS — I10 PRIMARY HYPERTENSION: Chronic | ICD-10-CM

## 2022-01-20 DIAGNOSIS — M48.061 SPINAL STENOSIS OF LUMBAR REGION, UNSPECIFIED WHETHER NEUROGENIC CLAUDICATION PRESENT: ICD-10-CM

## 2022-01-20 DIAGNOSIS — M62.81 MUSCLE WEAKNESS: ICD-10-CM

## 2022-01-20 DIAGNOSIS — E21.0 PRIMARY HYPERPARATHYROIDISM: ICD-10-CM

## 2022-01-20 DIAGNOSIS — Z91.81 AT RISK FOR FALLS: ICD-10-CM

## 2022-01-20 DIAGNOSIS — A04.71 RECURRENT CLOSTRIDIOIDES DIFFICILE DIARRHEA: Primary | ICD-10-CM

## 2022-01-20 DIAGNOSIS — E78.5 DYSLIPIDEMIA: ICD-10-CM

## 2022-01-20 PROCEDURE — 99214 OFFICE O/P EST MOD 30 MIN: CPT | Mod: S$GLB,,, | Performed by: CLINICAL NURSE SPECIALIST

## 2022-01-20 PROCEDURE — 3075F PR MOST RECENT SYSTOLIC BLOOD PRESS GE 130-139MM HG: ICD-10-PCS | Mod: CPTII,S$GLB,, | Performed by: CLINICAL NURSE SPECIALIST

## 2022-01-20 PROCEDURE — 4010F ACE/ARB THERAPY RXD/TAKEN: CPT | Mod: CPTII,S$GLB,, | Performed by: CLINICAL NURSE SPECIALIST

## 2022-01-20 PROCEDURE — 3075F SYST BP GE 130 - 139MM HG: CPT | Mod: CPTII,S$GLB,, | Performed by: CLINICAL NURSE SPECIALIST

## 2022-01-20 PROCEDURE — 99999 PR PBB SHADOW E&M-EST. PATIENT-LVL IV: CPT | Mod: PBBFAC,,, | Performed by: CLINICAL NURSE SPECIALIST

## 2022-01-20 PROCEDURE — 4010F PR ACE/ARB THEARPY RXD/TAKEN: ICD-10-PCS | Mod: CPTII,S$GLB,, | Performed by: CLINICAL NURSE SPECIALIST

## 2022-01-20 PROCEDURE — 99214 PR OFFICE/OUTPT VISIT, EST, LEVL IV, 30-39 MIN: ICD-10-PCS | Mod: S$GLB,,, | Performed by: CLINICAL NURSE SPECIALIST

## 2022-01-20 PROCEDURE — 1159F MED LIST DOCD IN RCRD: CPT | Mod: CPTII,S$GLB,, | Performed by: CLINICAL NURSE SPECIALIST

## 2022-01-20 PROCEDURE — 3008F PR BODY MASS INDEX (BMI) DOCUMENTED: ICD-10-PCS | Mod: CPTII,S$GLB,, | Performed by: CLINICAL NURSE SPECIALIST

## 2022-01-20 PROCEDURE — 1157F PR ADVANCE CARE PLAN OR EQUIV PRESENT IN MEDICAL RECORD: ICD-10-PCS | Mod: CPTII,S$GLB,, | Performed by: CLINICAL NURSE SPECIALIST

## 2022-01-20 PROCEDURE — 3079F PR MOST RECENT DIASTOLIC BLOOD PRESSURE 80-89 MM HG: ICD-10-PCS | Mod: CPTII,S$GLB,, | Performed by: CLINICAL NURSE SPECIALIST

## 2022-01-20 PROCEDURE — 3008F BODY MASS INDEX DOCD: CPT | Mod: CPTII,S$GLB,, | Performed by: CLINICAL NURSE SPECIALIST

## 2022-01-20 PROCEDURE — 99999 PR PBB SHADOW E&M-EST. PATIENT-LVL IV: ICD-10-PCS | Mod: PBBFAC,,, | Performed by: CLINICAL NURSE SPECIALIST

## 2022-01-20 PROCEDURE — 1157F ADVNC CARE PLAN IN RCRD: CPT | Mod: CPTII,S$GLB,, | Performed by: CLINICAL NURSE SPECIALIST

## 2022-01-20 PROCEDURE — 1159F PR MEDICATION LIST DOCUMENTED IN MEDICAL RECORD: ICD-10-PCS | Mod: CPTII,S$GLB,, | Performed by: CLINICAL NURSE SPECIALIST

## 2022-01-20 PROCEDURE — 3079F DIAST BP 80-89 MM HG: CPT | Mod: CPTII,S$GLB,, | Performed by: CLINICAL NURSE SPECIALIST

## 2022-01-20 NOTE — PATIENT INSTRUCTIONS
1. Complete study diary until return for enema on 2/7/22  2. Last dose of Vancomycin will be on 2/5/22  3. Collect stool specimen on evening of 2/6/22 or morning of 2/7/22 to bring to appointment for study on 2/7/22  4. Return to ID clinic 2/7/22 at 10a for baseline/enema study visit

## 2022-01-20 NOTE — PROGRESS NOTES
Subjective:      Patient ID: Manisha Wynne is a 71 y.o. female.    Chief Complaint:Clostridium Difficile      History of Present Illness  Study Name: A Phase 3 Open-Label Clinical Study to Evaluate the Safety and Tolerability of Rebiotix [id number] (microbiota suspension) in Subjects with Recurrent Clostridium difficile Infection; IRB#2019.097   Sponsor: Dawna  PI: Dr. Paula Abdi     Screening Visit     Date: 20JAN2022     Ms. Manisha Wynne is a 71 y.o. female who presents today for screening for Study Title/IRB number A Phase 3 Open-Label Clinical Study to Evaluate the Safety and Tolerability of Rebiotix [id number] (microbiota suspension) in Subjects with Recurrent Clostridium difficile Infection; IRB#2019.097 (PI: [place]).      Ms. Wynne has a past medical history of recurrent C.Diff. She was last diagnosed with C.Diff on 12/10/21 and is currently on a Vancomycin taper. She reports now having 1-2 soft [place] type 4/5 stools a day. Denies fever, chills, nausea, vomiting, diarrhea, or abdominal tenderness.      The subject's inclusion/exclusion criteria was reviewed. Adequate time was given for the patient to ask questions and receive answers concerning the study. Subject verbalized desire to continue participation in study, understanding that she can withdraw consent at any time.      Physical assessment, quality of life questionnaires, and labs drawn as per study protocol. Reviewed current home medications (see list below) and medical history. Reviewed how to collect diary entry for study and how to collect stool prior to baseline visit that will occur on 2/7/22. Subject was informed that she can contact the ID research team at any time. Phone numbers provided. Subject did not have any further questions.       Current Outpatient Medications:     aspirin (ECOTRIN) 81 MG EC tablet, Take 81 mg by mouth. 1 Tablet, Delayed Release (E.C.) Oral Every day, Disp: , Rfl:     buPROPion  (WELLBUTRIN XL) 300 MG 24 hr tablet, Take 300 mg by mouth once daily. , Disp: , Rfl:     calcium-vitamin D 600 mg(1,500mg) -200 unit Tab, Take 1 tablet by mouth once daily. , Disp: , Rfl:     cholecalciferol, vitamin D3, 125 mcg (5,000 unit) Tab, Take 5,000 Units by mouth once daily., Disp: , Rfl:     estradioL (ESTRACE) 0.01 % (0.1 mg/gram) vaginal cream, Place 1 g vaginally twice a week., Disp: 42.5 g, Rfl: 2    FLUoxetine 40 MG capsule, Take 40 mg by mouth once daily., Disp: , Rfl:     lisdexamfetamine (VYVANSE) 70 MG capsule, Take 1 capsule (70 mg total) by mouth every morning., Disp: 30 capsule, Rfl: 0    losartan (COZAAR) 25 MG tablet, TAKE 1 TABLET EVERY DAY, Disp: 90 tablet, Rfl: 6    metoprolol tartrate (LOPRESSOR) 25 MG tablet, TAKE 1 TABLET TWICE DAILY, Disp: 180 tablet, Rfl: 3    pravastatin (PRAVACHOL) 20 MG tablet, TAKE 1 TABLET EVERY DAY, Disp: 90 tablet, Rfl: 3    PROLIA 60 mg/mL Syrg, , Disp: , Rfl:     vancomycin (VANCOCIN) 125 MG capsule, Take 1 capsule (125 mg total) by mouth 4 (four) times daily for 14 days, THEN 1 capsule (125 mg total) 3 (three) times daily for 7 days, THEN 1 capsule (125 mg total) 2 (two) times a day for 7 days, THEN 1 capsule (125 mg total) once daily for 7 days, THEN 1 capsule (125 mg total) every other day for 7 days, THEN 1 capsule (125 mg total) Every 3 (three) days for 7 days., Disp: 104 capsule, Rfl: 0        Review of Systems   Constitutional: Negative for chills, fever and malaise/fatigue.   HENT: Negative for congestion and sore throat.    Eyes: Negative for redness.   Cardiovascular: Negative for chest pain and leg swelling.   Respiratory: Negative for cough and shortness of breath.    Skin: Negative for rash.   Musculoskeletal: Positive for back pain and falls. Negative for joint swelling.   Gastrointestinal: Negative for abdominal pain, constipation, diarrhea, nausea and vomiting.   Genitourinary: Negative for hematuria.   Neurological: Positive for  disturbances in coordination, loss of balance, numbness, tremors and weakness. Negative for dizziness.   Psychiatric/Behavioral: Negative for altered mental status.     Objective:   Physical Exam  Vitals reviewed.   Constitutional:       General: She is not in acute distress.     Appearance: Normal appearance. She is not ill-appearing.   HENT:      Head: Normocephalic and atraumatic.      Right Ear: External ear normal.      Left Ear: External ear normal.      Nose: Nose normal. No congestion.      Mouth/Throat:      Mouth: Mucous membranes are moist.      Pharynx: No posterior oropharyngeal erythema.   Eyes:      General:         Right eye: No discharge.         Left eye: No discharge.      Conjunctiva/sclera: Conjunctivae normal.   Cardiovascular:      Rate and Rhythm: Normal rate and regular rhythm.      Pulses: Normal pulses.      Heart sounds: Normal heart sounds.   Pulmonary:      Effort: Pulmonary effort is normal. No respiratory distress.      Breath sounds: Normal breath sounds. No wheezing or rales.   Abdominal:      General: Bowel sounds are normal. There is no distension.      Palpations: Abdomen is soft.      Tenderness: There is no abdominal tenderness. There is no guarding.   Musculoskeletal:         General: No swelling. Normal range of motion.      Cervical back: Normal range of motion.   Lymphadenopathy:      Cervical: No cervical adenopathy.   Skin:     General: Skin is warm and dry.      Capillary Refill: Capillary refill takes less than 2 seconds.   Neurological:      General: No focal deficit present.      Mental Status: She is alert and oriented to person, place, and time.   Psychiatric:         Behavior: Behavior normal.       Assessment:       1. Recurrent Clostridioides difficile diarrhea    2. Research study patient    3. Spinal stenosis of lumbar region, unspecified whether neurogenic claudication present    4. Depression, unspecified depression type    5. Primary hypertension    6. SSS  (sick sinus syndrome)    7. Dyslipidemia    8. Cardiac pacemaker in situ    9. Stage 3 chronic kidney disease, unspecified whether stage 3a or 3b CKD    10. Primary hyperparathyroidism    11. Muscle weakness    12. At risk for falls          Plan:       1. Labs for study  2. Questionnaire for study  3. Reviewed how to complete study diary  4. Reviewed how to collect stool sample to bring to baseline visit  5. Continue Vancomycin- last dose 2/5/22  6. Return to clinic 2/7/22 at 10a for baseline visit and enema administration  7. If problems, concerns, questions contact [facility] at [phone]

## 2022-01-20 NOTE — PROGRESS NOTES
Study Name: A Phase 3 Open-Label Clinical Study to Evaluate the Safety and Tolerability of Rebiotix [id number] (microbiota suspension) in Subjects with Recurrent Clostridium difficile Infection; IRB#[id number]   Sponsor: Dawna  PI: Dr. Paula Abdi     Consent     Date: 20JAN2022     Informed Consent:     Consenting was completed in private area using the most current IRB approved version of the ICF by myself and patient was given ample time to review consent prior to execution of ICF.        Prior to the Informed Consent (IC) being signed, or any study protocol required data collection, testing, procedure, or intervention being performed, the following was done and/or discussed:?   · Patient was given a copy of the IC for review??   · Purpose of the study and qualifications to participate??   · Study design, follow up schedule, and tests or procedures done at each visit?   · Confidentiality and HIPAA Authorization for Release of Medical Records for the research trial/ subject's rights/research related injury?   · Risk, Benefits, Alternative Treatments, Compensation and Costs?   · Participation in the research trial is voluntary and patient may withdraw at anytime?   · Contact information for study related questions?      Patient verbalizes understanding of the above: Yes?   Contact information for CRC and PI given to patient: Yes?   Patient able to adequately summarize: the purpose of the study, the risks associated with the study, and all procedures, testing, and follow-ups associated with the study: Yes?      Ms. Manisha Wynne signed the IRB approved version of informed consent form for the Rebiotix Open Label research study.? Each page of the consent was reviewed with the patient and all questions answered satisfactorily. The patient signed the paper consent form and and received a copy of same (copy of informed consent made and provided to patient). The original consent was scanned into electronic  medical records (EPIC).

## 2022-01-21 ENCOUNTER — TELEPHONE (OUTPATIENT)
Dept: INFECTIOUS DISEASES | Facility: CLINIC | Age: 72
End: 2022-01-21
Payer: MEDICARE

## 2022-01-21 ENCOUNTER — PATIENT MESSAGE (OUTPATIENT)
Dept: INFECTIOUS DISEASES | Facility: CLINIC | Age: 72
End: 2022-01-21
Payer: MEDICARE

## 2022-01-21 DIAGNOSIS — A04.71 RECURRENT CLOSTRIDIOIDES DIFFICILE DIARRHEA: Primary | ICD-10-CM

## 2022-01-21 RX ORDER — VANCOMYCIN HYDROCHLORIDE 125 MG/1
125 CAPSULE ORAL DAILY
Qty: 7 CAPSULE | Refills: 0 | Status: SHIPPED | OUTPATIENT
Start: 2022-01-29 | End: 2022-02-05

## 2022-01-21 NOTE — TELEPHONE ENCOUNTER
Refill for Vancomycin called in to continue until 2/5/22 then will have 24 hour antibiotic washout before Rebiotix FMT on 2/7/22.

## 2022-01-24 ENCOUNTER — PATIENT MESSAGE (OUTPATIENT)
Dept: GASTROENTEROLOGY | Facility: CLINIC | Age: 72
End: 2022-01-24
Payer: MEDICARE

## 2022-01-24 DIAGNOSIS — R74.01 ELEVATED TRANSAMINASE LEVEL: Primary | ICD-10-CM

## 2022-01-26 ENCOUNTER — PATIENT MESSAGE (OUTPATIENT)
Dept: INTERNAL MEDICINE | Facility: CLINIC | Age: 72
End: 2022-01-26
Payer: MEDICARE

## 2022-01-27 ENCOUNTER — TELEPHONE (OUTPATIENT)
Dept: INFECTIOUS DISEASES | Facility: CLINIC | Age: 72
End: 2022-01-27
Payer: MEDICARE

## 2022-01-27 NOTE — TELEPHONE ENCOUNTER
Study Name: A Phase 3 Open-Label Clinical Study to Evaluate the Safety and Tolerability of Rebiotix [id number] (microbiota suspension) in Subjects with Recurrent Clostridium difficile Infection; IRB#[id number]   Sponsor: Dawna  PI: Dr. Paula Abdi    Subject: [id number]    Date: 27JAN2022     Called MsMercedes Manisha Gonzalesjulian to follow up that screening labs resulted. Subject meets inclusion/exclusion criteria for Rebiotix study. Enema scheduled for 07FEB2022. Reviewed study procedures for day of enema. Continues to take Vancomycin 125 mg po qd. Having average of 2 soft/formed bowel movements a day. Reminded to complete diary daily and collect stool specimen to bring day of appointment. All questions answered.

## 2022-01-31 ENCOUNTER — LAB VISIT (OUTPATIENT)
Dept: LAB | Facility: OTHER | Age: 72
End: 2022-01-31
Attending: INTERNAL MEDICINE
Payer: MEDICARE

## 2022-01-31 DIAGNOSIS — R74.01 ELEVATED TRANSAMINASE LEVEL: ICD-10-CM

## 2022-01-31 LAB
ALBUMIN SERPL BCP-MCNC: 4 G/DL (ref 3.5–5.2)
ALP SERPL-CCNC: 57 U/L (ref 55–135)
ALT SERPL W/O P-5'-P-CCNC: 50 U/L (ref 10–44)
ANION GAP SERPL CALC-SCNC: 9 MMOL/L (ref 8–16)
AST SERPL-CCNC: 40 U/L (ref 10–40)
BILIRUB SERPL-MCNC: 0.7 MG/DL (ref 0.1–1)
BUN SERPL-MCNC: 24 MG/DL (ref 8–23)
CALCIUM SERPL-MCNC: 9.4 MG/DL (ref 8.7–10.5)
CHLORIDE SERPL-SCNC: 106 MMOL/L (ref 95–110)
CO2 SERPL-SCNC: 25 MMOL/L (ref 23–29)
CREAT SERPL-MCNC: 1.1 MG/DL (ref 0.5–1.4)
EST. GFR  (AFRICAN AMERICAN): 58 ML/MIN/1.73 M^2
EST. GFR  (NON AFRICAN AMERICAN): 50 ML/MIN/1.73 M^2
GLUCOSE SERPL-MCNC: 103 MG/DL (ref 70–110)
POTASSIUM SERPL-SCNC: 4.4 MMOL/L (ref 3.5–5.1)
PROT SERPL-MCNC: 7.2 G/DL (ref 6–8.4)
SODIUM SERPL-SCNC: 140 MMOL/L (ref 136–145)

## 2022-01-31 PROCEDURE — 80053 COMPREHEN METABOLIC PANEL: CPT | Performed by: INTERNAL MEDICINE

## 2022-01-31 PROCEDURE — 36415 COLL VENOUS BLD VENIPUNCTURE: CPT | Performed by: INTERNAL MEDICINE

## 2022-02-02 ENCOUNTER — OFFICE VISIT (OUTPATIENT)
Dept: NEUROLOGY | Facility: CLINIC | Age: 72
End: 2022-02-02
Payer: MEDICARE

## 2022-02-02 ENCOUNTER — PATIENT MESSAGE (OUTPATIENT)
Dept: NEUROLOGY | Facility: CLINIC | Age: 72
End: 2022-02-02

## 2022-02-02 VITALS
HEART RATE: 78 BPM | DIASTOLIC BLOOD PRESSURE: 76 MMHG | BODY MASS INDEX: 23.57 KG/M2 | SYSTOLIC BLOOD PRESSURE: 149 MMHG | WEIGHT: 155 LBS | TEMPERATURE: 99 F

## 2022-02-02 DIAGNOSIS — N18.31 STAGE 3A CHRONIC KIDNEY DISEASE: ICD-10-CM

## 2022-02-02 DIAGNOSIS — N39.41 URGE INCONTINENCE OF URINE: ICD-10-CM

## 2022-02-02 DIAGNOSIS — M48.02 CERVICAL STENOSIS OF SPINE: ICD-10-CM

## 2022-02-02 DIAGNOSIS — G60.3 IDIOPATHIC PROGRESSIVE POLYNEUROPATHY: Primary | ICD-10-CM

## 2022-02-02 DIAGNOSIS — D89.89 LIGHT CHAIN DISEASE, KAPPA TYPE: ICD-10-CM

## 2022-02-02 DIAGNOSIS — R79.89 ELEVATED LIVER FUNCTION TESTS: Primary | ICD-10-CM

## 2022-02-02 DIAGNOSIS — G60.8 SENSORY PERIPHERAL NEUROPATHY: ICD-10-CM

## 2022-02-02 PROCEDURE — 3008F BODY MASS INDEX DOCD: CPT | Mod: CPTII,S$GLB,, | Performed by: PSYCHIATRY & NEUROLOGY

## 2022-02-02 PROCEDURE — 3077F PR MOST RECENT SYSTOLIC BLOOD PRESSURE >= 140 MM HG: ICD-10-PCS | Mod: CPTII,S$GLB,, | Performed by: PSYCHIATRY & NEUROLOGY

## 2022-02-02 PROCEDURE — 3077F SYST BP >= 140 MM HG: CPT | Mod: CPTII,S$GLB,, | Performed by: PSYCHIATRY & NEUROLOGY

## 2022-02-02 PROCEDURE — 3078F PR MOST RECENT DIASTOLIC BLOOD PRESSURE < 80 MM HG: ICD-10-PCS | Mod: CPTII,S$GLB,, | Performed by: PSYCHIATRY & NEUROLOGY

## 2022-02-02 PROCEDURE — 99999 PR PBB SHADOW E&M-EST. PATIENT-LVL III: CPT | Mod: PBBFAC,,, | Performed by: PSYCHIATRY & NEUROLOGY

## 2022-02-02 PROCEDURE — 1125F AMNT PAIN NOTED PAIN PRSNT: CPT | Mod: CPTII,S$GLB,, | Performed by: PSYCHIATRY & NEUROLOGY

## 2022-02-02 PROCEDURE — 1157F PR ADVANCE CARE PLAN OR EQUIV PRESENT IN MEDICAL RECORD: ICD-10-PCS | Mod: CPTII,S$GLB,, | Performed by: PSYCHIATRY & NEUROLOGY

## 2022-02-02 PROCEDURE — 99499 UNLISTED E&M SERVICE: CPT | Mod: S$GLB,,, | Performed by: PSYCHIATRY & NEUROLOGY

## 2022-02-02 PROCEDURE — 99215 PR OFFICE/OUTPT VISIT, EST, LEVL V, 40-54 MIN: ICD-10-PCS | Mod: S$GLB,,, | Performed by: PSYCHIATRY & NEUROLOGY

## 2022-02-02 PROCEDURE — 3078F DIAST BP <80 MM HG: CPT | Mod: CPTII,S$GLB,, | Performed by: PSYCHIATRY & NEUROLOGY

## 2022-02-02 PROCEDURE — 3008F PR BODY MASS INDEX (BMI) DOCUMENTED: ICD-10-PCS | Mod: CPTII,S$GLB,, | Performed by: PSYCHIATRY & NEUROLOGY

## 2022-02-02 PROCEDURE — 1125F PR PAIN SEVERITY QUANTIFIED, PAIN PRESENT: ICD-10-PCS | Mod: CPTII,S$GLB,, | Performed by: PSYCHIATRY & NEUROLOGY

## 2022-02-02 PROCEDURE — 99215 OFFICE O/P EST HI 40 MIN: CPT | Mod: S$GLB,,, | Performed by: PSYCHIATRY & NEUROLOGY

## 2022-02-02 PROCEDURE — 1157F ADVNC CARE PLAN IN RCRD: CPT | Mod: CPTII,S$GLB,, | Performed by: PSYCHIATRY & NEUROLOGY

## 2022-02-02 PROCEDURE — 4010F PR ACE/ARB THEARPY RXD/TAKEN: ICD-10-PCS | Mod: CPTII,S$GLB,, | Performed by: PSYCHIATRY & NEUROLOGY

## 2022-02-02 PROCEDURE — 99499 RISK ADDL DX/OHS AUDIT: ICD-10-PCS | Mod: S$GLB,,, | Performed by: PSYCHIATRY & NEUROLOGY

## 2022-02-02 PROCEDURE — 4010F ACE/ARB THERAPY RXD/TAKEN: CPT | Mod: CPTII,S$GLB,, | Performed by: PSYCHIATRY & NEUROLOGY

## 2022-02-02 PROCEDURE — 99999 PR PBB SHADOW E&M-EST. PATIENT-LVL III: ICD-10-PCS | Mod: PBBFAC,,, | Performed by: PSYCHIATRY & NEUROLOGY

## 2022-02-02 NOTE — PATIENT INSTRUCTIONS
Thank you for coming     Use cane to be safe  Gabapentin 100mg at night     Follow up in 6 months

## 2022-02-02 NOTE — PROGRESS NOTES
NEUROLOGY  Outpatient Follow up Clinic visit note    [address] [address]  [phone] (office) / [phone] ( (fax)    Patient Name:  Manisha Wynne  :  1950  MR #:  [id number]  Acct #:  [id number]    Date of Neurology Visit: 2022  Name of Neurologist: Margarita Mcduffie MD    Other Physicians:  Iris Blue MD (Primary Care Physician); No ref. provider found (Referring)    Chief Complaint:   numbness in feet    History of Present Illness (HPI):  Manisha Wynne is a 71 y.o. female presents to the Neurology clinic for follow up.  I saw the patient for evaluation of falls, numbness in feet previously.     Today the patient reports she has had recurrent C. Diff infections. She is planning to do a fecal enema as part of a research trial. If she does not have C. Diff infections for 8 weeks she will be cleared for surgery.     She continues to experience pain and numbness in his toes. When the pain is bad she walks with a walking stick. No falls. Continues to experience bladder incontinence- chronic.  She plans to restart PT.     Gabapentin makes her very sleepy.     She has noted numbness in her hands only if she keeps her hands in a particular position.   She has noted a chronic tremor in her hands- at times at rest. The tremor would affect her handwriting but now she is more concisous and takes her time when she writes        Visit of 2021:  Denies falls. She reports chronic urinary incontinence. Continues to endorse paresthesias in feet, uses a cane. EMG/ NCS with absent surals, low amplitude fibular motor responses. Exam consistent with length dependent neuropathy, patient asked to recheck light chain levels, Uro-Gynecology referral for incontinence, follow up with Dr. Cheek.      Visit of 2021:  Denies falls, continues to endorse numbness in feet, feeling wobbly. Tremor in hands. Patient was recommended blood work, follow  up with Dr. Cheek for management of cervical spine stenosis, EMG/ NCS , she was found to have elevated B6, asked to stop supplement, level rechecked          Initial HPI: 1//20/2021  In Jan 2019- 2020 she fell 4 times. She states the first thing she noticed was that she was falling. One time she tripepd but the other falls her legs gave out on her. She falls forwards and was trying to get slippers on and she fell and broke her wrist. She has hurt her face during the falls. She is not lightheaded during the falls but endorses lightheadedness at times when she bends over.     In Summer 2019 she developed numbness and tingling in the bottom of her feet. She has noted that her toes now stay in a flexed position. She has burning in her feet and it alternates between the feet. She has burning pain in her feet. Gabapentin did not help her.   She took it 3 times a day. It did not help her feet.     She was seen by Dr. Romero. She participated in a clinical trial which involve wearing a device called walkasins device. She stopped using Walkasins device as the trial ended.  The device seemed to help her    She had recurrence of her sciatica for which she saw Dr. Booker recently.     She has not fallen in a year which she attributes to being very careful when she walks.       Duration:2 years   Location: falls   Intensity: severe   Aggravating factors: when she walks  Relieving factors: unclear- no falls in a one year  Frequency: few times   Timing: during the day  Associated symptoms: new numbness in the bottom of feet         Treatment to date:    None       Review of Systems:      14-point review of systems as follows:   No check armando indicates NEGATIVE response   Constitutional: [] weight loss, [] change to appetite   Eyes: [x] change in vision, [] double vision   Ears, nose, mouth, throat: [] frequent nose bleeds, [] ringing in the ears  Hearing loss +  Respiratory: [] cough, [] wheezing   Cardiovascular: [] chest pain,  [] palpitations   Gastrointestinal: [] jaundice, [] nausea/vomiting   Genitourinary: [] incontinence, [] burning with urination   Hematologic/lymphatic: [] easy bruising/bleeding, [] night sweats   Neurological: [x] numbness, [] weakness   Endocrine: [x] fatigue, [] heat/cold intolerance   Allergy/Immunologic: [] fevers, [] chills   Musculoskeletal: [] muscle pain, [] joint pain   Psychiatric: [] thoughts of harming self/others, [x] depression   Integumentary: [] rashes, [] sores that do not heal     Past Medical, Surgical, Family & Social History:   Past Medical History:   Diagnosis Date    Abnormal Pap smear     Atrial fibrillation     AV block, 1st degree 7/25/2012    Cataract     Depression 7/24/2012    Facet arthritis of lumbar region 3/31/2015    Falls     3 falls in the last 6 mos--noted 6/19/19    Hyperlipidemia     Hypertension 7/24/2012    Neuropathy     Other specified cardiac dysrhythmias(427.89)     Sleep apnea     Syncope 7/24/2012       Home Medications:     Current Outpatient Medications:     aspirin (ECOTRIN) 81 MG EC tablet, Take 81 mg by mouth. 1 Tablet, Delayed Release (E.C.) Oral Every day, Disp: , Rfl:     buPROPion (WELLBUTRIN XL) 300 MG 24 hr tablet, Take 300 mg by mouth once daily. , Disp: , Rfl:     calcium-vitamin D 600 mg(1,500mg) -200 unit Tab, Take 1 tablet by mouth once daily. , Disp: , Rfl:     cholecalciferol, vitamin D3, 125 mcg (5,000 unit) Tab, Take 5,000 Units by mouth once daily., Disp: , Rfl:     estradioL (ESTRACE) 0.01 % (0.1 mg/gram) vaginal cream, Place 1 g vaginally twice a week., Disp: 42.5 g, Rfl: 2    FLUoxetine 40 MG capsule, Take 40 mg by mouth once daily., Disp: , Rfl:     lisdexamfetamine (VYVANSE) 70 MG capsule, Take 1 capsule (70 mg total) by mouth every morning., Disp: 30 capsule, Rfl: 0    losartan (COZAAR) 25 MG tablet, TAKE 1 TABLET EVERY DAY, Disp: 90 tablet, Rfl: 6    metoprolol tartrate (LOPRESSOR) 25 MG tablet, TAKE 1 TABLET TWICE  DAILY, Disp: 180 tablet, Rfl: 3    pravastatin (PRAVACHOL) 20 MG tablet, TAKE 1 TABLET EVERY DAY, Disp: 90 tablet, Rfl: 3    PROLIA 60 mg/mL Syrg, , Disp: , Rfl:     vancomycin (VANCOCIN) 125 MG capsule, Take 1 capsule (125 mg total) by mouth once daily. When current prescription ends continue taking 1 capsule (125mg) by mouth once a day until 2/5/22 for 7 days, Disp: 7 capsule, Rfl: 0    Physical Examination:  BP (!) 149/76 (BP Location: Left arm, Patient Position: Sitting, BP Method: Medium (Automatic))   Pulse 78   Temp 98.7 °F (37.1 °C)   Wt 70.3 kg (154 lb 15.7 oz)   LMP 10/01/2003   BMI 23.57 kg/m²     GENERAL:  General appearance: Well, non-toxic appearing.  No apparent distress.  Fundi exam: normal.  Neck: supple.  Carotid auscultation: normal.  Heart auscultation: normal.  Peripheral pulses: normal.  Extremities: normal.    MENTAL STATUS:  Alertness, attention span & concentration: normal.  Language: normal.  Orientation to self, place & time:  normal.  Memory, recent & remote: normal.  Fund of knowledge: normal.      SPEECH:  Clear and fluent.  Follows complex commands.    CRANIAL NERVES:  Cranial Nerves II-XII were examined.  II - Visual fields: normal.  III, IV, VI: PERRL, EOMI, No ptosis, No nystagmus.  V - Facial sensation: normal.  VII - Face symmetry & mobility: normal.  VIII - Hearing: normal.  IX, X - Palate: mobile & midline.  XI - Shoulder shrug: normal.  XII - Tongue protrusion: normal.    GROSS MOTOR:  Gait & station: normal. Difficulty with tandem, unable to walk on heels/ toes  Tone:  Slightly increased tone in her legs   Abnormal movements: slight postural tremor in both hands no resting tremor  Finger-nose & Heel-knee-shin: normal.  Rapid alternating movements & drift: normal.  Romberg: absent    MUSCLE STRENGTH:   5/5 throughout    REFLEXES:  2+ reflexes bilaterally- biceps, brachioradialis and triceps   3+ patellar reflexes bilaterally   2 beat ankle clonus bilaterally   Negative  Maria's  sign     Sensory exam:  Pinprick gradient to upper foot  Temp gradient to bilateral mid leg  Vibration- 4 seconds at the right big toe and left big toe- 8 seconds   3 beat ankle clonus on right and 2 beat on left         Hammer toes   High arched feet       Fine postural tremor in bilateral hands     Diagnostic Data Reviewed:               6/10/2021:  Vitamin B6- 15      EMG/NCS by me on 7/29/2021:    There is electrophysiologic evidence of:   1. Bilateral chronic L5 radiculopathy   2. The bilaterally absent minimum H-reflex latencies are suggestive of underlying bilateral S1 chronic radiculopathy versus underlying early sensory motor axonal polyneuropathy. Clinical correlation recommended.     ___________________________   Dr. Margarita Mcduffie        Blood work on 01/20/2021:  Vitamin B1-within normal limits-68  Sensory neuropathy panel-negative  Vitamin B6 -76  Vitamin E-1764  Immunofixation electrophoresis-within normal limits  RPR-nonreactive  ESR-20  Light chain immunofixation mild elevation of kappa light chains noted- 2.43 (normal-0.33-1.94), normal kappa/lambda ratio  Very long chain fatty acid evaluation-elevation of see 22:0 was elevated, likely dietary artifact      CT entire spine 5/4/2021:    FINDINGS:  Cervical spine: Cervical sagittal alignment is similar to prior with trace grade 1 retrolisthesis of C5 on C6. In addition there is trace grade 1 anterolisthesis of C7 on T1.  There is degenerative disc disease with intervertebral disc height loss and endplate degeneration at all levels most pronounced at C5/C6 with severe height loss and endplate degeneration. Allowing for degenerative changes there is no evidence for acute fracture of the cervical spine     Intrathecal contrast identified throughout the cervical canal without evidence for myelographic block. Punctate pneumocephalus along the cerebellar folia compatible with intrathecal infusion.     C2/C3: Small posterior disc osteophyte with  uncovertebral joint hypertrophy and facet joint arthropathy with mild central canal stenosis without significant neural foraminal stenosis.     C3/C4: Small posterior disc osteophyte with uncovertebral joint hypertrophy and facet arthropathy without significant central canal or neural foraminal stenosis.     C4/C5: Posterior disc osteophyte with uncovertebral joint hypertrophy and facet joint arthropathy without significant central canal stenosis with moderate left neural foraminal stenosis without significant right neural foraminal stenosis.     C5/C6: Posterior disc osteophyte complex with effacement ventral thecal sac and impression on the ventral cervical spinal cord which is slightly deformed with moderate resulting central canal stenosis. There is uncovertebral joint hypertrophy and facet arthropathy with moderate bilateral foraminal stenosis.     C6/C7: Small posterior disc osteophyte with uncovertebral joint hypertrophy and facet joint arthropathy without significant central canal stenosis and moderate left neural foraminal stenosis.     C7/T1: Small central disc protrusion with partial effacement ventral thecal sac without significant central canal stenosis or bony neural foraminal stenosis.     Thoracic spine: Thoracic sagittal alignment is within normal limits. There is scattered mild endplate degenerative change throughout the thoracic spine most pronounced at T11/T12 level. Allowing for degenerative changes the thoracic vertebral body heights and contours are within normal is without evidence for acute fracture. Small superior endplate Schmorl node deformities superior T6 and T8 levels.     There is intrathecal contrast identified throughout the thoracic canal without evidence for myelographic block. Tip of the conus approximates T12/L1 level.     No significant disc bulge, central canal or neural foraminal stenosis throughout the thoracic canal.     Partially visualized 2 lead left-sided pacer device  in the field of view. Ill-defined dependent opacities in the visualized lung suggestive for scarring atelectasis. Incidental enlargement of the ascending aorta measuring 4.1 cm in greatest transverse dimension.  Scattered atherosclerotic plaquing in the distribution of the coronary arteries.  Probable fatty involution of the pancreas.     Lumbar spine: Lumbar sagittal alignment is within normal limits. The lumbar vertebral body heights and contours are within as without evidence for acute fracture there is small inferior endplate Schmorl node deformity at L1. Allowing for degenerative changes there is no evidence for acute fracture or subluxation of the lumbar spine.     Intrathecal contrast identified throughout the lumbar canal without evidence for myelographic block.     Scattered punctate foci of gas within the thecal sac and epidural space compatible with intrathecal infusion procedure.     T12/L1: Small posterior disc osteophyte without significant central canal or bony neural foraminal stenosis     L1/L2: Small posterior disc osteophyte without significant central canal or neural foraminal stenosis.     L2/L3: Small posterior disc osteophyte with ligamentum flavum hypertrophy without significant central canal or bony neural foraminal stenosis.     L3/L4: Small disc bulge with ligamentum flavum hypertrophy without significant central canal stenosis or neural foraminal stenosis.     L4/L5: Small posterior disc osteophyte with ligamentum flavum hypertrophy and facet joint arthropathy with mild moderate central canal stenosis and probable mild moderate bilateral foraminal stenosis.     L5/S1: No significant disc bulge central canal stenosis. There is facet joint arthropathy probable mild moderate bilateral neural foraminal stenosis.            CT myelogram on 05/04/2021:       COMPARISON:  Lumbar spine radiograph 01/14/2021     PROCEDURE:  Informed consent was obtained. Patient was placed prone on the fluoroscopy  table and region of interest at L2-L3 was localized. Patient was prepped and draped in usual sterile fashion. Anesthesia was obtained with local 2 cc of 1% lidocaine.  A 22 gauge spinal needle was advanced into the L2-L3 interspace via translaminar approach.  CSF return was clear spontaneously.  10cc of Omnipaque 300 was then injected into the thecal sac under fluoroscopic guidance.  Confirmatory imaging was performed.  Stylet was replaced and subsequently needle was removed.  Contrast identified to transit to the cervical canal under fluoroscopy without evidence for myelographic block.  Patient tolerated the procedure well with no immediate complications and was transferred to the CT suite for further imaging.     Impression:     Successful fluoroscopic guided myelography no evidence for myelographic block.  Dedicated fluoroscopic imaging unable to be obtained secondary to full spine myelogram and for details of myelogram please see CT report.    CT C spine without contrast on 01/25/2021    FINDINGS:  Visualized intracranial structures demonstrate no significant abnormality.  Soft tissue structures of the neck demonstrate no significant abnormality.  No significant calcific atherosclerosis.  Right lobe of the thyroid demonstrates a 3 mm hypodensity suggestive of a nodule.  Visualized portions of the lungs demonstrate a 4 mm pulmonary nodule in the right upper lobe (series 2, image 259).     Grade 1 anterolisthesis of C4 on C5 and grade 1 retrolisthesis of C5 on C6.  Intervertebral disc space narrowing with sclerosis of the endplates of C5-6.  Intervertebral disc space narrowing of C4-5 and C6-7.  No evidence of acute fracture or dislocation.  Vertebral body heights are maintained.     Degenerative changes as described below.     C2-C3:No posterior disc osteophyte complex formation, central spinal canal stenosis, or neural foraminal narrowing.     C3-C4:Mild posterior disc osteophyte complex formation.  No central  spinal canal stenosis or neural foraminal narrowing.     C4-C5:Left-sided uncovertebral spurring and facet arthropathy causing severe left-sided neural foraminal narrowing.  Mild posterior disc osteophyte complex formation.  No central spinal canal stenosis or right-sided neural foraminal narrowing.     C5-C6:Posterior disc osteophyte complex formation causing moderate central spinal canal stenosis.  Bilateral facet arthropathy and uncovertebral spurring causing moderate to severe bilateral neural foraminal narrowing.     C6-C7:Posterior disc osteophyte complex formation, uncovertebral spurring, and facet arthropathy predominantly on the left causing moderate left-sided neural foraminal narrowing.  No central spinal canal stenosis or right-sided neural foraminal narrowing.     C7-T1:No posterior disc osteophyte complex formation, central spinal canal stenosis, or neural foraminal narrowing.     Impression:     Degenerative changes of the cervical spine most prominent at C4-5 and C5-6 as described above.     4 mm pulmonary nodule in the right upper lobe, unchanged when compared to prior exam, indicating likely benign etiology.          7/24/2019 EMG/NCS by Dr. Romero:                    Assessment and Plan:    Manisha Wynne is a 70 y.o. female presents to the Neurology clinic for follow up.     In Jan 2019- 2020 she fell 4 times. She states the first thing she noticed was that she was falling. One time she tripepd but the other falls her legs gave out on her. She fell when she was trying to get slippers on and  and broke her wrist. She has hurt her face during the falls. She is not lightheaded during the falls but endorses lightheadedness at times when she bends over.  Since her last visit with me she denies any falls. She was recommended blood work, CT C-spine, PT.  cervical spine CT revealed cervical spine stenosis predominantly at C5-C6.  The patient was seen by Dr. Nicolette Cheek recently who recommended  cervical spine surgery ( C3-C6 laminectomy and fusion) for cervical spine stenosis. She has not undergone surgery given recurrent C. Difficile infections. The plan is to undergo the surgery once she is free of C. Difficile infections for 8 weeks.       On examination the patient has hammer toes, high arched feet and a fine postural tremor in bilateral hands.   She has had falls which could be potentially secondary to the cervical myelopathy.  Additionally, this new bladder incontinence over the last 2 years could be due to the same.  Given the significant stenosis noted I agree with need for cervical spine surgery especially at the C5-C6 level.     Concern for sensory axonal polyneuropathy based on exam. EMG/ NCS by me with findings mentioned below. Blood work to evaluate underlying cause of neuropathy is negative except for elevated B6 which corrected after stopping the supplement and elevated light chains.      Assessment:  1. Falls possibly 2/2 cervical myelopathy  2. Idiopathic progressive polyneuropathy-  light chain elevation noted  3. Cervical myelopathy- C5-6 moderate C spine stenosis   4. Urinary incontinence x2 years  5. Hand paresthesias   6. Lumbar radiculopathy- bilateral at L5    Plan:  Patient asked to try low dose Gabapentin 100 mg at night, patient has CKD stage 3 will dose Gabapentin accordingly   -Neuropathy work up has revealed elevated light chain levels.EMG/ NCS with absent lower extremity sensory responses and low amplitude Fibular motor response when recording at the EDB bilaterally. The absent sural responses would not be considered pathologic as these can be absent above the age of 70. Fibular sensory responses could be absent due to underlying L5 radiculopathy bilaterally. Clinically she does have sensory length dependent changes concerning for underlying neuropathy  - Patient would like to hold off on EMG/NCS of bilateral upper extremities, if symptoms worsen will consider obtaining the  same   - Patient follows with Dr. Fan for [place] light chain elevation  - Referral to Uro-Gynecology for management of urinary incontinence, placed at last visit   -Gene testing for CMT via [facility]- negative except for VUSs as mentioned above   -Follow up with Dr. Cheek for management of moderate cervical spinal stenosis C5-6    The patient will return to clinic in 6 months.           Margarita Mcduffie MD  Medicine-Neurology, Clinical Neurophysiology     40 minutes spent during the visit with the patient, in addition this time includes time spent reviewing prior records, clinical notes, imaging if obtained and blood work on the day of the visit. The total time spent was all on the day of the visit.    This note was created with voice recognition software.  Grammatical, syntax and spelling errors may be inevitable.

## 2022-02-04 ENCOUNTER — PATIENT MESSAGE (OUTPATIENT)
Dept: INFECTIOUS DISEASES | Facility: CLINIC | Age: 72
End: 2022-02-04
Payer: MEDICARE

## 2022-02-04 ENCOUNTER — TELEPHONE (OUTPATIENT)
Dept: INFECTIOUS DISEASES | Facility: CLINIC | Age: 72
End: 2022-02-04
Payer: MEDICARE

## 2022-02-04 NOTE — TELEPHONE ENCOUNTER
Study Name: A Phase 3 Open-Label Clinical Study to Evaluate the Safety and Tolerability of Rebiotix [id number] (microbiota suspension) in Subjects with Recurrent Clostridium difficile Infection; IRB#[id number]   Sponsor: Dawna  PI: AL Coker     Subject: [id number]     Date: 04FEB2022    Called and spoke to Ms. Wynne to review for Monday's clinic appointment.     Discussed following:  · Last dose of Vancomycin will be on Saturday 2/5/22. Do not take any antibiotics after this date  · Collect stool specimen in container provided (evening of 2/6/22 or morning of 2/7/22) and keep in cooler/refrigerated to bring to appointment on 2/7/22  · Bring completed study diary to appointment on 2/7/22  · Wear comfortable clothes to appointment and recommend bringing depends  · Come to ID clinic for 10a on Monday 2/7/22 at 10a    Ms. Wynne verbalized understanding and instructed to call if any questions.

## 2022-02-08 ENCOUNTER — TELEPHONE (OUTPATIENT)
Dept: INFECTIOUS DISEASES | Facility: CLINIC | Age: 72
End: 2022-02-08
Payer: MEDICARE

## 2022-02-08 NOTE — TELEPHONE ENCOUNTER
Study Name: A Phase 3 Open-Label Clinical Study to Evaluate the Safety and Tolerability of Rebiotix [id number] (microbiota suspension) in Subjects with Recurrent Clostridium difficile Infection; IRB#[id number]   Sponsor: Dawna  PI: AL Coker     Subject: [id number]     Date: 07FEB2022     Called and spoke to Ms. Wynne prior to appointment scheduled for 07FEB2022 at 10a to let her know that would be unable to administer enema FMT today. [place] the [employer] package with IP was not delivered on 04FEB2002 as expected. Package is not due to arrive until today, 07FEB2022. Due to delayed arrival advised per Dawna not to administer product. New IP requested to be shipped today and arrive tomorrow 08FEB2022. Will plan to administer enema on 09FEB2022     Discussed following:  · Take dose of Vancomycin 07FEB2000. Do not take any antibiotics after this date  · Collect stool specimen in container provided (evening of 2/8/22 or morning of 2/9/22) and keep in cooler/refrigerated to bring to appointment on 2/9/22  · Bring completed study diary to appointment on 2/9/22  · Wear comfortable clothes to appointment and recommend bringing depends  · Come to ID clinic for 10a on Wednesday 2/9/22 at 10a     Ms. Wynne verbalized understanding and instructed to call if any questions.

## 2022-02-09 ENCOUNTER — RESEARCH ENCOUNTER (OUTPATIENT)
Dept: RESEARCH | Facility: HOSPITAL | Age: 72
End: 2022-02-09
Payer: MEDICARE

## 2022-02-09 VITALS
SYSTOLIC BLOOD PRESSURE: 126 MMHG | TEMPERATURE: 98 F | WEIGHT: 156.75 LBS | RESPIRATION RATE: 20 BRPM | BODY MASS INDEX: 23.83 KG/M2 | HEART RATE: 55 BPM | DIASTOLIC BLOOD PRESSURE: 76 MMHG

## 2022-02-09 DIAGNOSIS — A04.71 RECURRENT CLOSTRIDIOIDES DIFFICILE DIARRHEA: Primary | ICD-10-CM

## 2022-02-09 DIAGNOSIS — Z00.6 RESEARCH STUDY PATIENT: ICD-10-CM

## 2022-02-09 NOTE — PATIENT INSTRUCTIONS
1. Complete study diary for next 7 days  2. You do not need to collect stool specimen prior to next visit. If diarrhea returns >3 times/24 hours last 2 days call clinic for further directions and plan that will need to collect stool specimen in containers provided.   3. Do not take probiotics for the next 8 weeks, dietary probiotics are allowed  4. Do not take steroids or antibiotics for next 8 weeks if possible. Please call ID research team if prescribed these medications  5. Return to ID clinic 2/16/22 at 10a for week 1 follow up.

## 2022-02-09 NOTE — PROGRESS NOTES
Study Name: A Phase 3 Open-Label Clinical Study to Evaluate the Safety and Tolerability of Rebiotix [id number] (microbiota suspension) in Subjects with Recurrent Clostridium difficile Infection; IRB#2019.097   Sponsor: Dawna  PI: AL Coker     Baseline visit/Enema Adminstration    Subject [id number]     Date: 09FEB2022     Ms. Manisha Wynne is a 71 y.o. female who presents today for baseline visit and enema administration for Study Title/IRB number A Phase 3 Open-Label Clinical Study to Evaluate the Safety and Tolerability of Rebiotix [id number] (microbiota suspension) in Subjects with Recurrent Clostridium difficile Infection; IRB#2019.097 (PI: Trinidad).      The subject's inclusion/exclusion criteria was reviewed. Study diary reviewed. Last dose of Vancomycin was 07FEB2022 at 11a. She meets eligibility for enema administration today.       Labs drawn as per study protocol. Subject brought stool specimen collected at home for stool tests. Reviewed current home medications (see list below) and medical history. No change from previous visit. Enema administered in clinic over 2 minutes without difficulty. Subject laid in left lateral decubitus position for 25 minutes post administration with no leakage. Vitals signs take pre and post with no significant changes. Instructed to complete diary entry for next 7 days. Instructed not to take probiotics or any antibiotics without talking to ID research team. To return in 1 week for next study visit on 16FEB2022. Subject was informed that she can contact the ID research team at any time. Phone numbers provided. Subject did not have any further questions.     Vitals:    02/09/22 1118 02/09/22 1140 02/09/22 1156 02/09/22 1212   BP: 108/68 (!) 146/77 120/80 126/76   Pulse: 64 70 68 (!) 55   Resp: 20 16 16 20   Temp: 97.9 °F (36.6 °C) 97.9 °F (36.6 °C) 98 °F (36.7 °C) 98.1 °F (36.7 °C)   Weight:             Current Outpatient Medications:     aspirin  (ECOTRIN) 81 MG EC tablet, Take 81 mg by mouth. 1 Tablet, Delayed Release (E.C.) Oral Every day, Disp: , Rfl:     buPROPion (WELLBUTRIN XL) 300 MG 24 hr tablet, Take 300 mg by mouth once daily. , Disp: , Rfl:     calcium-vitamin D 600 mg(1,500mg) -200 unit Tab, Take 1 tablet by mouth once daily. , Disp: , Rfl:     cholecalciferol, vitamin D3, 125 mcg (5,000 unit) Tab, Take 5,000 Units by mouth once daily., Disp: , Rfl:     estradioL (ESTRACE) 0.01 % (0.1 mg/gram) vaginal cream, Place 1 g vaginally twice a week., Disp: 42.5 g, Rfl: 2    FLUoxetine 40 MG capsule, Take 40 mg by mouth once daily., Disp: , Rfl:     lisdexamfetamine (VYVANSE) 70 MG capsule, Take 1 capsule (70 mg total) by mouth every morning., Disp: 30 capsule, Rfl: 0    losartan (COZAAR) 25 MG tablet, TAKE 1 TABLET EVERY DAY, Disp: 90 tablet, Rfl: 6    metoprolol tartrate (LOPRESSOR) 25 MG tablet, TAKE 1 TABLET TWICE DAILY, Disp: 180 tablet, Rfl: 3    pravastatin (PRAVACHOL) 20 MG tablet, TAKE 1 TABLET EVERY DAY, Disp: 90 tablet, Rfl: 3    PROLIA 60 mg/mL Syrg, , Disp: , Rfl:              PLAN     1. Labs and stool per study protocol  2. Enema administration per study protocol. Enema bag completely infused over 2 minutes. Patient tolerated well and laid in lateral decubitus position for 25 minutes post administration.   3. Follow up per study protocol. Next appointment scheduled for 1 week  4. Instructed may experience abdominal discomfort, cramping, diarrhea, fever <100.5 over next 24-48 hours. Notify ID is experience symptoms that are concerning or last longer than that time. If experience severe or life threatening symptoms go to ED for evaluation and notify ID.  5. Notify ID research team of any problems, concerns or questions.

## 2022-02-15 ENCOUNTER — INFUSION (OUTPATIENT)
Dept: INFECTIOUS DISEASES | Facility: HOSPITAL | Age: 72
End: 2022-02-15
Attending: INTERNAL MEDICINE
Payer: MEDICARE

## 2022-02-15 ENCOUNTER — CLINICAL SUPPORT (OUTPATIENT)
Dept: CARDIOLOGY | Facility: HOSPITAL | Age: 72
End: 2022-02-15
Attending: INTERNAL MEDICINE
Payer: MEDICARE

## 2022-02-15 VITALS
DIASTOLIC BLOOD PRESSURE: 66 MMHG | SYSTOLIC BLOOD PRESSURE: 151 MMHG | WEIGHT: 153.31 LBS | RESPIRATION RATE: 18 BRPM | OXYGEN SATURATION: 98 % | HEART RATE: 76 BPM | TEMPERATURE: 97 F | BODY MASS INDEX: 23.31 KG/M2

## 2022-02-15 DIAGNOSIS — N18.30 STAGE 3 CHRONIC KIDNEY DISEASE, UNSPECIFIED WHETHER STAGE 3A OR 3B CKD: ICD-10-CM

## 2022-02-15 DIAGNOSIS — I49.5 SSS (SICK SINUS SYNDROME): ICD-10-CM

## 2022-02-15 DIAGNOSIS — M81.8 OTHER OSTEOPOROSIS WITHOUT CURRENT PATHOLOGICAL FRACTURE: Primary | ICD-10-CM

## 2022-02-15 PROCEDURE — 63600175 PHARM REV CODE 636 W HCPCS: Mod: JG,HCNC | Performed by: INTERNAL MEDICINE

## 2022-02-15 PROCEDURE — 96372 THER/PROPH/DIAG INJ SC/IM: CPT | Mod: HCNC

## 2022-02-15 RX ADMIN — DENOSUMAB 60 MG: 60 INJECTION SUBCUTANEOUS at 11:02

## 2022-02-15 NOTE — PROGRESS NOTES
Received Prolia 60 mg injection sub Q in the left arm.  Patient stated she is taking calcium and vitamin D supplements, denies any major dental work in the last three months. Tolerated without difficulty and left unit in NAD.

## 2022-02-16 ENCOUNTER — RESEARCH ENCOUNTER (OUTPATIENT)
Dept: RESEARCH | Facility: HOSPITAL | Age: 72
End: 2022-02-16
Payer: MEDICARE

## 2022-02-16 NOTE — PROGRESS NOTES
Study Name: A Phase 3 Open-Label Clinical Study to Evaluate the Safety and Tolerability of Rebiotix [id number] (microbiota suspension) in Subjects with Recurrent Clostridium difficile Infection; IRB#2019.097   Sponsor: Dawna  PI: AL Coker     Follow up week 1 visit    Subject: [id number]    Date: 16FEB2022     Ms. Manisha Wynne is seen in clinic today for week 1 follow up.  received FMT by enema per the study on 09FEB2022 for a history of recurrent C.Diff. She has overall been doing well since the enema. Continues to have days with soft stools 2-3 times a day, some days with no bowel movements, denies watery bowel movements. Denies any new problems complaints, no change to medications, no ED/Urgent care visits.      Per study weight obtained, QOL survey completed.       Current Outpatient Medications:     aspirin (ECOTRIN) 81 MG EC tablet, Take 81 mg by mouth. 1 Tablet, Delayed Release (E.C.) Oral Every day, Disp: , Rfl:     buPROPion (WELLBUTRIN XL) 300 MG 24 hr tablet, Take 300 mg by mouth once daily. , Disp: , Rfl:     calcium-vitamin D 600 mg(1,500mg) -200 unit Tab, Take 1 tablet by mouth once daily. , Disp: , Rfl:     cholecalciferol, vitamin D3, 125 mcg (5,000 unit) Tab, Take 5,000 Units by mouth once daily., Disp: , Rfl:     estradioL (ESTRACE) 0.01 % (0.1 mg/gram) vaginal cream, Place 1 g vaginally twice a week., Disp: 42.5 g, Rfl: 2    FLUoxetine 40 MG capsule, Take 40 mg by mouth once daily., Disp: , Rfl:     lisdexamfetamine (VYVANSE) 70 MG capsule, Take 1 capsule (70 mg total) by mouth every morning., Disp: 30 capsule, Rfl: 0    losartan (COZAAR) 25 MG tablet, TAKE 1 TABLET EVERY DAY, Disp: 90 tablet, Rfl: 6    metoprolol tartrate (LOPRESSOR) 25 MG tablet, TAKE 1 TABLET TWICE DAILY, Disp: 180 tablet, Rfl: 3    pravastatin (PRAVACHOL) 20 MG tablet, TAKE 1 TABLET EVERY DAY, Disp: 90 tablet, Rfl: 3    PROLIA 60 mg/mL Syrg, , Disp: , Rfl:           PLAN  1. Do not  take probiotics for the through study week 8   2. Do not take antibiotics through study week 8 if possible. Please call ID research team if prescribed these medications  3. If watery diarrhea greater than 4 times in 24 hours call to notify ID at [phone]  4. Phone call follow up in 3 weeks (approximately 3/9/22) - Research team will call  5. Return to ID clinic 4/6/22 at 11a

## 2022-02-16 NOTE — PATIENT INSTRUCTIONS
1. Do not take probiotics for the through study week 8   2. Do not take antibiotics through study week 8 if possible. Please call ID research team if prescribed these medications  3. If watery diarrhea greater than 4 times in 24 hours call to notify ID at [phone]  4. Phone call follow up in 3 weeks (approximately 3/9/22)- Research team will call  5. Return to ID clinic 4/6/22 at 11a    Patient Education       IBS Diet   About this topic   Irritable bowel syndrome, or IBS, is a common, long-term health problem of your belly. It causes a change in bowel habits but does not lead to a more serious problem. Doctors do not know what causes IBS and there is no cure for it. Care focuses on how to control the signs.  Signs may be mild to very bad. They can last for weeks or months. The main signs are:  · Belly pain and cramping  · Belly fullness  · Gassy feeling  · Bloating  · Upset stomach  · Loose or hard stools  · Loss of appetite  · Mucus in your stool  · Feeling like you havent finished a bowel movement  What drugs may be needed?   · The doctor may order drugs to:  ? Lessen diarrhea  ? Lessen gas  ? Keep bowels regular  · Your doctor or dietitian may suggest a vitamin, mineral, or probiotic supplement. Your doctor may also suggest slowly adding a fiber supplement.  What changes to diet are needed?   · Eat a proper diet in smaller portions. Foods higher in fat can cause and worsen diarrhea and belly pain. You may be able to eat these foods if you are not having any symptoms.  · Ask your doctor if you should increase fiber in your diet. If so, add more fiber to your diet slowly. Adding fiber too fast can make your symptoms worse.  · Take care of your mental health. Talk to your doctor about relaxation techniques. This will help you manage your stress levels.  What foods are good to eat?   · Tender meats made without added fat. This includes lean beef and pork, poultry, fish, eggs, soy products like tofu, and smooth  nut butters.  · Dairy products, such as buttermilk; fat-free and low-fat milk; yogurt; aged cheese, such as cheddar, Parmesan, Provolone, or Swiss; and low-fat ice cream. You may need to choose lactose-free dairy products if you have lactose intolerance or diarrhea.  · All ready-to-eat or cooked grain foods. This includes whole grain bread, whole grain pasta, brown rice, and oats.  · Any vegetables that your body tolerates. Many people find they can eat green beans, carrots, butternut squash, and spinach.  · All fruits and fruit juice except prune, apple, and grape juice.  · Choose oils more often than solid fats. Limit fats to less than 8 teaspoons a day.  · Water, decaffeinated coffee or tea, and sports drinks.  What foods should be limited or avoided?   · Avoid fatty foods like beef, pork, and other meats marbled with fat or poultry with the skin. Lunch meats, bologna, hot dogs, sky and sausage are all high fat foods to avoid. Also stay away from fried meats or fish.  · You may need to avoid dried beans and peas if they cause gas.  · Avoid whole milk, cream, sour cream, and ice cream.  · Avoid foods with grains that cause symptoms.  · Limit vegetables to 1/2 cup serving to reduce symptoms in the beginning. Avoid any vegetables that you cannot tolerate. You may need to avoid vegetables that cause gas. These include broccoli, [place] sprouts, cabbage, cauliflower, greens, peas, lima beans, celery, and onions.  · Limit to 1/2 cup or 1 piece of fruit at each meal to reduce symptoms in the beginning. Avoid prune, apple, or grape juice. Avoid any juice sweetened with sorbitol. You may have symptoms if you drink more than 2 cups of fruit juice, due to the amount of fructose.  · Avoid drinks with caffeine or those sweetened with high-fructose corn syrup or sorbitol. These are things like cola, coffee, tea, or carbonated beverages.  · Limit high-fat desserts, such as pastries, cakes, cookies, pie, and ice cream. Avoid  honey or any dessert sweetened with high-fructose corn syrup or sorbitol.  · Limit acidic, spicy, fried, or greasy foods.  · Avoid alcohol, including beer, wine, and mixed drinks.  Helpful tips   · Drink plenty of fluids.  · Eat meals and snacks on a regular schedule. Do not skip meals. Aim for 5 or 6 small meals per day. Avoid eating a large meal all at once.  · Keep a food diary. This helps you track which foods or drinks cause you to have symptoms. Add new foods in small amounts and track how you feel.  · Read the nutrition facts label. This will help you determine what is in the food you eat.  Where can I learn more?   [employer]y [employer]  https://www.eatright.org/health/wellness/digestive-health/irritable-bowel-syndrome   Last Reviewed Date   2021-10-11  Consumer Information Use and Disclaimer   This information is not specific medical advice and does not replace information you receive from your health care provider. This is only a brief summary of general information. It does NOT include all information about conditions, illnesses, injuries, tests, procedures, treatments, therapies, discharge instructions or life-style choices that may apply to you. You must talk with your health care provider for complete information about your health and treatment options. This information should not be used to decide whether or not to accept your health care providers advice, instructions or recommendations. Only your health care provider has the knowledge and training to provide advice that is right for you.  Copyright   Copyright © 2021 [employer] and its affiliates and/or licensors. All rights reserved.

## 2022-02-22 ENCOUNTER — OFFICE VISIT (OUTPATIENT)
Dept: PODIATRY | Facility: CLINIC | Age: 72
End: 2022-02-22
Payer: MEDICARE

## 2022-02-22 ENCOUNTER — PATIENT MESSAGE (OUTPATIENT)
Dept: PODIATRY | Facility: CLINIC | Age: 72
End: 2022-02-22

## 2022-02-22 VITALS
BODY MASS INDEX: 23.33 KG/M2 | SYSTOLIC BLOOD PRESSURE: 123 MMHG | WEIGHT: 153.44 LBS | HEART RATE: 68 BPM | DIASTOLIC BLOOD PRESSURE: 73 MMHG

## 2022-02-22 DIAGNOSIS — M20.42 HAMMER TOES OF BOTH FEET: ICD-10-CM

## 2022-02-22 DIAGNOSIS — M20.41 HAMMER TOES OF BOTH FEET: ICD-10-CM

## 2022-02-22 DIAGNOSIS — L60.8 TOENAIL DEFORMITY: ICD-10-CM

## 2022-02-22 DIAGNOSIS — B35.1 ONYCHOMYCOSIS DUE TO DERMATOPHYTE: ICD-10-CM

## 2022-02-22 DIAGNOSIS — G60.8 SENSORY PERIPHERAL NEUROPATHY: Primary | ICD-10-CM

## 2022-02-22 PROCEDURE — 1157F PR ADVANCE CARE PLAN OR EQUIV PRESENT IN MEDICAL RECORD: ICD-10-PCS | Mod: HCNC,CPTII,S$GLB, | Performed by: PODIATRIST

## 2022-02-22 PROCEDURE — 1159F PR MEDICATION LIST DOCUMENTED IN MEDICAL RECORD: ICD-10-PCS | Mod: HCNC,CPTII,S$GLB, | Performed by: PODIATRIST

## 2022-02-22 PROCEDURE — 4010F ACE/ARB THERAPY RXD/TAKEN: CPT | Mod: HCNC,CPTII,S$GLB, | Performed by: PODIATRIST

## 2022-02-22 PROCEDURE — 3008F BODY MASS INDEX DOCD: CPT | Mod: HCNC,CPTII,S$GLB, | Performed by: PODIATRIST

## 2022-02-22 PROCEDURE — 1125F AMNT PAIN NOTED PAIN PRSNT: CPT | Mod: HCNC,CPTII,S$GLB, | Performed by: PODIATRIST

## 2022-02-22 PROCEDURE — 99999 PR PBB SHADOW E&M-EST. PATIENT-LVL III: CPT | Mod: PBBFAC,HCNC,, | Performed by: PODIATRIST

## 2022-02-22 PROCEDURE — 1125F PR PAIN SEVERITY QUANTIFIED, PAIN PRESENT: ICD-10-PCS | Mod: HCNC,CPTII,S$GLB, | Performed by: PODIATRIST

## 2022-02-22 PROCEDURE — 1159F MED LIST DOCD IN RCRD: CPT | Mod: HCNC,CPTII,S$GLB, | Performed by: PODIATRIST

## 2022-02-22 PROCEDURE — 3078F PR MOST RECENT DIASTOLIC BLOOD PRESSURE < 80 MM HG: ICD-10-PCS | Mod: HCNC,CPTII,S$GLB, | Performed by: PODIATRIST

## 2022-02-22 PROCEDURE — 99213 OFFICE O/P EST LOW 20 MIN: CPT | Mod: 25,HCNC,S$GLB, | Performed by: PODIATRIST

## 2022-02-22 PROCEDURE — 3008F PR BODY MASS INDEX (BMI) DOCUMENTED: ICD-10-PCS | Mod: HCNC,CPTII,S$GLB, | Performed by: PODIATRIST

## 2022-02-22 PROCEDURE — 3078F DIAST BP <80 MM HG: CPT | Mod: HCNC,CPTII,S$GLB, | Performed by: PODIATRIST

## 2022-02-22 PROCEDURE — 4010F PR ACE/ARB THEARPY RXD/TAKEN: ICD-10-PCS | Mod: HCNC,CPTII,S$GLB, | Performed by: PODIATRIST

## 2022-02-22 PROCEDURE — 1157F ADVNC CARE PLAN IN RCRD: CPT | Mod: HCNC,CPTII,S$GLB, | Performed by: PODIATRIST

## 2022-02-22 PROCEDURE — 3074F PR MOST RECENT SYSTOLIC BLOOD PRESSURE < 130 MM HG: ICD-10-PCS | Mod: HCNC,CPTII,S$GLB, | Performed by: PODIATRIST

## 2022-02-22 PROCEDURE — 99999 PR PBB SHADOW E&M-EST. PATIENT-LVL III: ICD-10-PCS | Mod: PBBFAC,HCNC,, | Performed by: PODIATRIST

## 2022-02-22 PROCEDURE — 11721 PR DEBRIDEMENT OF NAILS, 6 OR MORE: ICD-10-PCS | Mod: Q9,HCNC,S$GLB, | Performed by: PODIATRIST

## 2022-02-22 PROCEDURE — 3074F SYST BP LT 130 MM HG: CPT | Mod: HCNC,CPTII,S$GLB, | Performed by: PODIATRIST

## 2022-02-22 PROCEDURE — 11721 DEBRIDE NAIL 6 OR MORE: CPT | Mod: Q9,HCNC,S$GLB, | Performed by: PODIATRIST

## 2022-02-22 PROCEDURE — 99213 PR OFFICE/OUTPT VISIT, EST, LEVL III, 20-29 MIN: ICD-10-PCS | Mod: 25,HCNC,S$GLB, | Performed by: PODIATRIST

## 2022-02-22 NOTE — PROGRESS NOTES
Subjective:      Patient ID: Manisha Wynne is a 71 y.o. female.    Chief Complaint:   Nail Care    Manisha is a 71 y.o. female who presents to the clinic for evaluation and treatment of ingrown toenail.  Manisha has a past medical history of Abnormal Pap smear, Atrial fibrillation, AV block, 1st degree (7/25/2012), Cataract, Depression (7/24/2012), Facet arthritis of lumbar region (3/31/2015), Falls, Hyperlipidemia, Hypertension (7/24/2012), Neuropathy, Other specified cardiac dysrhythmias(427.89), Sleep apnea, and Syncope (7/24/2012). The patient's chief complaint left 2nd toe /toenail pain.  This patient has documented high risk feet requiring routine maintenance secondary to peripheral neuropathy.    Pt relates all her toes hurt this week however the left 2nd one seems to be a little red and may be swollen.   Patient relates she was at the pharmacy the other day and would like to know which hammertoe padding she should purchase.  She has been wearing reasonable shoes  She brought some pictures for review    Overall she is doing okay she plans to go to the [employer] for Thanksgiving      PCP: Iris Blue MD    Date Last Seen by PCP: 10/22/21    Current shoe gear:  Affected Foot: Casual shoes     Unaffected Foot: Casual shoes        Hemoglobin A1C   Date Value Ref Range Status   06/10/2021 5.4 4.0 - 5.6 % Final     Comment:     ADA Screening Guidelines:  5.7-6.4%  Consistent with prediabetes  >or=6.5%  Consistent with diabetes    High levels of fetal hemoglobin interfere with the HbA1C  assay. Heterozygous hemoglobin variants (HbS, HgC, etc)do  not significantly interfere with this assay.   However, presence of multiple variants may affect accuracy.     06/10/2021 5.4 4.0 - 5.6 % Final     Comment:     ADA Screening Guidelines:  5.7-6.4%  Consistent with prediabetes  >or=6.5%  Consistent with diabetes    High levels of fetal hemoglobin interfere with the HbA1C  assay. Heterozygous hemoglobin variants  (HbS, HgC, etc)do  not significantly interfere with this assay.   However, presence of multiple variants may affect accuracy.     11/29/2019 5.2 4.0 - 5.6 % Final     Comment:     ADA Screening Guidelines:  5.7-6.4%  Consistent with prediabetes  >or=6.5%  Consistent with diabetes  High levels of fetal hemoglobin interfere with the HbA1C  assay. Heterozygous hemoglobin variants (HbS, HgC, etc)do  not significantly interfere with this assay.   However, presence of multiple variants may affect accuracy.          Past Medical History:   Diagnosis Date    Abnormal Pap smear     Atrial fibrillation     AV block, 1st degree 7/25/2012    Cataract     Depression 7/24/2012    Facet arthritis of lumbar region 3/31/2015    Falls     3 falls in the last 6 mos--noted 6/19/19    Hyperlipidemia     Hypertension 7/24/2012    Neuropathy     Other specified cardiac dysrhythmias(427.89)     Sleep apnea     Syncope 7/24/2012     Past Surgical History:   Procedure Laterality Date    APPLICATION OF CARTILAGE GRAFT Bilateral 12/19/2019    Procedure: APPLICATION, CARTILAGE GRAFT AURICULAR JEZ;  Surgeon: Martinez Flores III, MD;  [facility];  Service: ENT;  Laterality: Bilateral;    CARDIAC PACEMAKER PLACEMENT  09/07/2012    [id number]    DILATION AND CURETTAGE OF UTERUS      Hx of precancerous cells?    ENDOSCOPIC ULTRASOUND OF UPPER GASTROINTESTINAL TRACT N/A 7/5/2019    Procedure: ULTRASOUND, UPPER GI TRACT, ENDOSCOPIC;  Surgeon: Kev Calderon MD;  Location: [facility] (2ND FLR);  Service: Endoscopy;  Laterality: N/A;  Pacemaker-Adam   appt confirmed-rb    MYELOGRAPHY N/A 5/4/2021    Procedure: Myelogram  CERVICAL, THORACIC AND LUMBAR;  Surgeon: [facility];  Location: [facility] 2ND FLR;  Service: Radiology;  Laterality: N/A;    NASAL SEPTOPLASTY N/A 12/19/2019    Procedure: SEPTOPLASTY, NOSE;  Surgeon: Martinez Flores III, MD;  Location: [facility] OR;  Service: ENT;  Laterality: N/A;   FOLLOW DR SALVATORE KIMBROUGH PROTOCOL    OPEN REDUCTION AND INTERNAL FIXATION (ORIF) OF FRACTURE OF DISTAL RADIUS Left 1/24/2020    Procedure: ORIF, FRACTURE, RADIUS, DISTAL-left;  Surgeon: Ellen Moe MD;  Location: [facility] OR;  Service: Orthopedics;  Laterality: Left;    SURGICAL REMOVAL OF NASAL TURBINATE Bilateral 12/19/2019    Procedure: EXCISION, NASAL TURBINATE;  Surgeon: Martinez Flores III, MD;  Location: [facility] OR;  Service: ENT;  Laterality: Bilateral;    TONSILLECTOMY      WISDOM TOOTH EXTRACTION       Current Outpatient Medications on File Prior to Visit   Medication Sig Dispense Refill    aspirin (ECOTRIN) 81 MG EC tablet Take 81 mg by mouth. 1 Tablet, Delayed Release (E.C.) Oral Every day      buPROPion (WELLBUTRIN XL) 300 MG 24 hr tablet Take 300 mg by mouth once daily.       calcium-vitamin D 600 mg(1,500mg) -200 unit Tab Take 1 tablet by mouth once daily.       cholecalciferol, vitamin D3, 125 mcg (5,000 unit) Tab Take 5,000 Units by mouth once daily.      estradioL (ESTRACE) 0.01 % (0.1 mg/gram) vaginal cream Place 1 g vaginally twice a week. 42.5 g 2    FLUoxetine 40 MG capsule Take 40 mg by mouth once daily.      lisdexamfetamine (VYVANSE) 70 MG capsule Take 1 capsule (70 mg total) by mouth every morning. 30 capsule 0    losartan (COZAAR) 25 MG tablet TAKE 1 TABLET EVERY DAY 90 tablet 6    metoprolol tartrate (LOPRESSOR) 25 MG tablet TAKE 1 TABLET TWICE DAILY 180 tablet 3    pravastatin (PRAVACHOL) 20 MG tablet TAKE 1 TABLET EVERY DAY 90 tablet 3    PROLIA 60 mg/mL Syrg        No current facility-administered medications on file prior to visit.     Review of patient's allergies indicates:  No Known Allergies    Review of Systems   Constitutional: Negative for chills, decreased appetite, fever, malaise/fatigue, night sweats, weight gain and weight loss.   Cardiovascular: Negative for chest pain, claudication, dyspnea on exertion, leg swelling, palpitations and syncope.    Respiratory: Negative for cough and shortness of breath.    Endocrine: Negative for cold intolerance and heat intolerance.   Hematologic/Lymphatic: Negative for bleeding problem. Does not bruise/bleed easily.   Skin: Positive for nail changes. Negative for color change, dry skin, flushing, itching, poor wound healing, rash, skin cancer, suspicious lesions and unusual hair distribution.   Musculoskeletal: Positive for arthritis and stiffness. Negative for back pain, falls, gout, joint pain, joint swelling, muscle cramps, muscle weakness, myalgias and neck pain.            Gastrointestinal: Negative for diarrhea, nausea and vomiting.   Neurological: Positive for numbness and paresthesias. Negative for dizziness, focal weakness, light-headedness, tremors, vertigo and weakness.   Psychiatric/Behavioral: Negative for altered mental status and depression. The patient does not have insomnia.    Allergic/Immunologic: Negative.            Objective:       Vitals:    02/22/22 0919   BP: 123/73   Pulse: 68   Weight: 69.6 kg (153 lb 7 oz)   PainSc:   4   PainLoc: Foot   69.6 kg (153 lb 7 oz) afeb    Physical Exam  Vitals reviewed.   Constitutional:       General: She is not in acute distress.     Appearance: She is well-developed. She is not ill-appearing, toxic-appearing or diaphoretic.      Comments: Proper supportive shoegear   Cardiovascular:      Pulses:           Dorsalis pedis pulses are 2+ on the right side and 2+ on the left side.        Posterior tibial pulses are 1+ on the right side and 1+ on the left side.      Comments: No edema to digits  Musculoskeletal:         General: Deformity present. No tenderness.      Right lower leg: No edema.      Left lower leg: No edema.      Right foot: Decreased range of motion. Deformity and prominent metatarsal heads present. No tenderness or bony tenderness.      Left foot: Decreased range of motion. Deformity and prominent metatarsal heads present. No tenderness or bony  tenderness.      Comments:    Semi- Reducible extensor and flexor contractures at the MTPJ and PIPJ of toes 2-5, bilat.     No pain on palpation   Feet:      Right foot:      Protective Sensation: 10 sites tested. 0 sites sensed.      Skin integrity: No ulcer, blister, skin breakdown, erythema, warmth, callus or dry skin.      Toenail Condition: Right toenails are abnormally thick and long. Fungal disease present.     Left foot:      Protective Sensation: 10 sites tested. 0 sites sensed.      Skin integrity: No ulcer, blister, skin breakdown, erythema, warmth, callus or dry skin.      Toenail Condition: Left toenails are abnormally thick and long. Fungal disease present.     Comments:  Toenails 1-5 bilaterally are elongated by 2-3 mm, thickened by 2-3 mm, discolored/yellowed, dystrophic, brittle with subungual debris.    Subungual right  3rd digit with mild HPK formation no signs of infection          Skin:     General: Skin is warm and dry.      Capillary Refill: Capillary refill takes 2 to 3 seconds.      Coloration: Skin is not pale.      Findings: No erythema or rash.      Nails: There is clubbing.      Comments:          Neurological:      Mental Status: She is alert and oriented to person, place, and time.      Sensory: Sensory deficit present.      Motor: Atrophy present.      Gait: Gait abnormal.   Psychiatric:         Attention and Perception: Attention normal.         Mood and Affect: Mood normal.         Speech: Speech normal.         Thought Content: Thought content normal.         Cognition and Memory: Cognition normal.         Judgment: Judgment normal.                    Assessment:       Encounter Diagnoses   Name Primary?    Sensory peripheral neuropathy Yes    Toenail deformity     Hammer toes of both feet     Onychomycosis due to dermatophyte          Plan:       Manisha was seen today for nail care.    Diagnoses and all orders for this visit:    Sensory peripheral neuropathy    Toenail  deformity    Hammer toes of both feet    Onychomycosis due to dermatophyte      I counseled the patient on her conditions, their implications and medical management.    - reviewed hammertoe padding options continue crest pads dispensed and demonstrated monitor skin for any breakdown    - Shoe inspection. Patient instructed on proper foot hygeine. We discussed wearing proper shoe gear, daily foot inspections, never walking without protective shoe gear, never putting sharp instruments to feet, routine podiatric nail visits every 2-3 months.      - With patient's permission, nails were aggressively reduced and debrided x 10 to their soft tissue attachment mechanically  removing all offending nail and debris. Patient relates relief following the procedure.       - patient to monitor for any bleeding under the callused area and return sooner this would indicate an ulcer; discussed with patient due to neuropathy may or may not have pain    - evaluated patient is new [employer] tennis shoes both a 10-,1/2 and 11 recommend size 10-,1/2    - 3 months or sooner if needed

## 2022-03-08 ENCOUNTER — DOCUMENTATION ONLY (OUTPATIENT)
Dept: TRANSPLANT | Facility: CLINIC | Age: 72
End: 2022-03-08
Payer: MEDICARE

## 2022-03-08 ENCOUNTER — OFFICE VISIT (OUTPATIENT)
Dept: OPHTHALMOLOGY | Facility: CLINIC | Age: 72
End: 2022-03-08
Attending: OPHTHALMOLOGY
Payer: MEDICARE

## 2022-03-08 DIAGNOSIS — H25.13 NUCLEAR SCLEROSIS, BILATERAL: Primary | ICD-10-CM

## 2022-03-08 PROCEDURE — 92136 IOL MASTER - OU - BOTH EYES: ICD-10-PCS | Mod: RT,S$GLB,, | Performed by: OPHTHALMOLOGY

## 2022-03-08 PROCEDURE — 4010F ACE/ARB THERAPY RXD/TAKEN: CPT | Mod: CPTII,S$GLB,, | Performed by: OPHTHALMOLOGY

## 2022-03-08 PROCEDURE — 3288F FALL RISK ASSESSMENT DOCD: CPT | Mod: CPTII,S$GLB,, | Performed by: OPHTHALMOLOGY

## 2022-03-08 PROCEDURE — 1126F PR PAIN SEVERITY QUANTIFIED, NO PAIN PRESENT: ICD-10-PCS | Mod: CPTII,S$GLB,, | Performed by: OPHTHALMOLOGY

## 2022-03-08 PROCEDURE — 99999 PR PBB SHADOW E&M-EST. PATIENT-LVL III: CPT | Mod: PBBFAC,,, | Performed by: OPHTHALMOLOGY

## 2022-03-08 PROCEDURE — 1157F PR ADVANCE CARE PLAN OR EQUIV PRESENT IN MEDICAL RECORD: ICD-10-PCS | Mod: CPTII,S$GLB,, | Performed by: OPHTHALMOLOGY

## 2022-03-08 PROCEDURE — 99999 PR PBB SHADOW E&M-EST. PATIENT-LVL III: ICD-10-PCS | Mod: PBBFAC,,, | Performed by: OPHTHALMOLOGY

## 2022-03-08 PROCEDURE — 99204 OFFICE O/P NEW MOD 45 MIN: CPT | Mod: S$GLB,,, | Performed by: OPHTHALMOLOGY

## 2022-03-08 PROCEDURE — 99204 PR OFFICE/OUTPT VISIT, NEW, LEVL IV, 45-59 MIN: ICD-10-PCS | Mod: S$GLB,,, | Performed by: OPHTHALMOLOGY

## 2022-03-08 PROCEDURE — 3288F PR FALLS RISK ASSESSMENT DOCUMENTED: ICD-10-PCS | Mod: CPTII,S$GLB,, | Performed by: OPHTHALMOLOGY

## 2022-03-08 PROCEDURE — 92136 OPHTHALMIC BIOMETRY: CPT | Mod: RT,S$GLB,, | Performed by: OPHTHALMOLOGY

## 2022-03-08 PROCEDURE — 1126F AMNT PAIN NOTED NONE PRSNT: CPT | Mod: CPTII,S$GLB,, | Performed by: OPHTHALMOLOGY

## 2022-03-08 PROCEDURE — 1101F PT FALLS ASSESS-DOCD LE1/YR: CPT | Mod: CPTII,S$GLB,, | Performed by: OPHTHALMOLOGY

## 2022-03-08 PROCEDURE — 4010F PR ACE/ARB THEARPY RXD/TAKEN: ICD-10-PCS | Mod: CPTII,S$GLB,, | Performed by: OPHTHALMOLOGY

## 2022-03-08 PROCEDURE — 1101F PR PT FALLS ASSESS DOC 0-1 FALLS W/OUT INJ PAST YR: ICD-10-PCS | Mod: CPTII,S$GLB,, | Performed by: OPHTHALMOLOGY

## 2022-03-08 PROCEDURE — 1157F ADVNC CARE PLAN IN RCRD: CPT | Mod: CPTII,S$GLB,, | Performed by: OPHTHALMOLOGY

## 2022-03-08 RX ORDER — PHENYLEPHRINE HYDROCHLORIDE 25 MG/ML
1 SOLUTION/ DROPS OPHTHALMIC
Status: CANCELLED | OUTPATIENT
Start: 2022-03-08

## 2022-03-08 RX ORDER — PREDNISOLONE ACETATE-GATIFLOXACIN-BROMFENAC .75; 5; 1 MG/ML; MG/ML; MG/ML
1 SUSPENSION/ DROPS OPHTHALMIC 3 TIMES DAILY
Qty: 5 ML | Refills: 3 | Status: SHIPPED | OUTPATIENT
Start: 2022-03-08 | End: 2022-07-28

## 2022-03-08 RX ORDER — TETRACAINE HYDROCHLORIDE 5 MG/ML
1 SOLUTION OPHTHALMIC
Status: CANCELLED | OUTPATIENT
Start: 2022-03-08

## 2022-03-08 RX ORDER — MOXIFLOXACIN 5 MG/ML
1 SOLUTION/ DROPS OPHTHALMIC
Status: CANCELLED | OUTPATIENT
Start: 2022-03-08

## 2022-03-08 RX ORDER — TROPICAMIDE 10 MG/ML
1 SOLUTION/ DROPS OPHTHALMIC
Status: CANCELLED | OUTPATIENT
Start: 2022-03-08

## 2022-03-08 RX ORDER — SODIUM CHLORIDE 0.9 % (FLUSH) 0.9 %
10 SYRINGE (ML) INJECTION
Status: DISCONTINUED | OUTPATIENT
Start: 2022-03-08 | End: 2022-10-21 | Stop reason: CLARIF

## 2022-03-08 NOTE — PROGRESS NOTES
HPI     70 y/o female is here for cataract evaluation.  She is having trouble   driving at night with on coming lights blinding her.  She also feels like   her depth perception is off.  She feels closer to things when she guan   than she really is.    Last edited by Shannan Green on 3/8/2022  4:05 PM. (History)            Assessment /Plan     For exam results, see Encounter Report.    Nuclear sclerosis, bilateral      Visually Significant Cataract: Patient reports decreased vision consistent with the clinical amount of lenticular opacity, which reaches the level of visual significance and affects activities of daily living. Risks, benefits, and alternatives to cataract surgery were discussed and the consent reviewed. IOL options were discussed, including ATIOLs and the associated side effects and additional patient cost associated with them.   IOL Selections:   Right eye  IOL: diboo 18.5     Left eye  IOL: diboo 19.5    Pt wishes to have right eye done first.  MIld FFKCN OS>OD    The patient expresses a desire to reduce spectacle dependence. I reviewed various IOL and LASER refractive surgical options and we will attempt to minimize spectacle dependence by managing astigmatism and optimizing IOL selection. Femtosecond LASER assisted cataract surgery (FLACS) technology was explained to the patient with educational videos and discussion.  The patient voices understanding and wishes to implement this technology during the cataract procedure.  I explained the increased precision of the LASER versus manual techniques, especially as it relates to astigmatism reduction with arcuate incisions.  I emphasized that although our goal is to reduce the need for refractive correction after surgery, there may still be a need for spectacle correction to achieve optimal visual acuity, and that a reasonable range of functional vision should be the expectation.  No guarantees are made about post operative refraction or visual acuity,  as the eye may heal in unpredictable ways, and the standard risks, benefits, and alternatives to cataract surgery were explained.  The patient understands that the refractive portions of this cataract procedure are not covered by insurance, and that there is an out of pocket expense of $1250 per eye. I also explained that even though our pre-operative plan is to utilize advanced refractive technologies during surgery, that I may decide to eliminate part or all of this plan if surgical challenges or complications arise, or I feel that it is not in the patient's best interest. Consent forms and an ABN form were given to the patient to review.  Catalys Parameters:  Right Eye:   CHARLOTTE:  12mm   ?Need to armando patient sitting up?: n  Capsulotomy: Scanned Capsule   rdGrdrrdarddrderd:rd rd3rd Arcuate: OFF  Incisions: OFF  Left Eye:   CHARLOTTE:  12mm   ?Need to armando patient sitting up?: n  Capsulotomy: Scanned Capsule  rdGrdrrdarddrderd:rd rd3rd Arcuate:  OFF  Incisions:  OFF

## 2022-03-09 ENCOUNTER — RESEARCH ENCOUNTER (OUTPATIENT)
Dept: INFECTIOUS DISEASES | Facility: CLINIC | Age: 72
End: 2022-03-09
Payer: MEDICARE

## 2022-03-10 NOTE — PROGRESS NOTES
Study Name: A Phase 3 Open-Label Clinical Study to Evaluate the Safety and Tolerability of Rebiotix [id number] (microbiota suspension) in Subjects with Recurrent Clostridium difficile Infection; IRB#[id number]   Sponsor: Dawna  PI: Dr. Paula Abdi     Follow up week 4 phone assessment     Subject: [id number]     Date: 9MAR2022     Ms. Manisha Wynne was contacted by phone for week 4 follow up. She received FMT by enema per the study on 9FEB2022 for a history of recurrent C.Diff. She has not had any new CDI symptoms.     Ms. Wynne reported some constipation, and was instructed to try Miralax. No other new adverse events assessed or changes in medication reported. Per study weight obtained.    All required assessments per protocol completed during visit and documented on site specific case report form. Source for specific data points can be located in subjects binder.

## 2022-03-21 ENCOUNTER — OFFICE VISIT (OUTPATIENT)
Dept: UROGYNECOLOGY | Facility: CLINIC | Age: 72
End: 2022-03-21
Payer: MEDICARE

## 2022-03-21 VITALS
DIASTOLIC BLOOD PRESSURE: 90 MMHG | HEIGHT: 68 IN | SYSTOLIC BLOOD PRESSURE: 142 MMHG | WEIGHT: 155.63 LBS | BODY MASS INDEX: 23.59 KG/M2

## 2022-03-21 DIAGNOSIS — A04.71 RECURRENT CLOSTRIDIOIDES DIFFICILE DIARRHEA: ICD-10-CM

## 2022-03-21 DIAGNOSIS — N39.46 MIXED STRESS AND URGE INCONTINENCE: Primary | ICD-10-CM

## 2022-03-21 DIAGNOSIS — Z87.19 HISTORY OF CHRONIC CONSTIPATION: ICD-10-CM

## 2022-03-21 DIAGNOSIS — N95.2 VAGINAL ATROPHY: ICD-10-CM

## 2022-03-21 PROCEDURE — 4010F PR ACE/ARB THEARPY RXD/TAKEN: ICD-10-PCS | Mod: CPTII,S$GLB,, | Performed by: NURSE PRACTITIONER

## 2022-03-21 PROCEDURE — 1159F PR MEDICATION LIST DOCUMENTED IN MEDICAL RECORD: ICD-10-PCS | Mod: CPTII,S$GLB,, | Performed by: NURSE PRACTITIONER

## 2022-03-21 PROCEDURE — 1126F AMNT PAIN NOTED NONE PRSNT: CPT | Mod: CPTII,S$GLB,, | Performed by: NURSE PRACTITIONER

## 2022-03-21 PROCEDURE — 3080F PR MOST RECENT DIASTOLIC BLOOD PRESSURE >= 90 MM HG: ICD-10-PCS | Mod: CPTII,S$GLB,, | Performed by: NURSE PRACTITIONER

## 2022-03-21 PROCEDURE — 3008F PR BODY MASS INDEX (BMI) DOCUMENTED: ICD-10-PCS | Mod: CPTII,S$GLB,, | Performed by: NURSE PRACTITIONER

## 2022-03-21 PROCEDURE — 1160F RVW MEDS BY RX/DR IN RCRD: CPT | Mod: CPTII,S$GLB,, | Performed by: NURSE PRACTITIONER

## 2022-03-21 PROCEDURE — 1101F PT FALLS ASSESS-DOCD LE1/YR: CPT | Mod: CPTII,S$GLB,, | Performed by: NURSE PRACTITIONER

## 2022-03-21 PROCEDURE — 99999 PR PBB SHADOW E&M-EST. PATIENT-LVL IV: CPT | Mod: PBBFAC,,, | Performed by: NURSE PRACTITIONER

## 2022-03-21 PROCEDURE — 1126F PR PAIN SEVERITY QUANTIFIED, NO PAIN PRESENT: ICD-10-PCS | Mod: CPTII,S$GLB,, | Performed by: NURSE PRACTITIONER

## 2022-03-21 PROCEDURE — 1157F PR ADVANCE CARE PLAN OR EQUIV PRESENT IN MEDICAL RECORD: ICD-10-PCS | Mod: CPTII,S$GLB,, | Performed by: NURSE PRACTITIONER

## 2022-03-21 PROCEDURE — 99999 PR PBB SHADOW E&M-EST. PATIENT-LVL IV: ICD-10-PCS | Mod: PBBFAC,,, | Performed by: NURSE PRACTITIONER

## 2022-03-21 PROCEDURE — 1159F MED LIST DOCD IN RCRD: CPT | Mod: CPTII,S$GLB,, | Performed by: NURSE PRACTITIONER

## 2022-03-21 PROCEDURE — 1157F ADVNC CARE PLAN IN RCRD: CPT | Mod: CPTII,S$GLB,, | Performed by: NURSE PRACTITIONER

## 2022-03-21 PROCEDURE — 99213 PR OFFICE/OUTPT VISIT, EST, LEVL III, 20-29 MIN: ICD-10-PCS | Mod: S$GLB,,, | Performed by: NURSE PRACTITIONER

## 2022-03-21 PROCEDURE — 99213 OFFICE O/P EST LOW 20 MIN: CPT | Mod: S$GLB,,, | Performed by: NURSE PRACTITIONER

## 2022-03-21 PROCEDURE — 1101F PR PT FALLS ASSESS DOC 0-1 FALLS W/OUT INJ PAST YR: ICD-10-PCS | Mod: CPTII,S$GLB,, | Performed by: NURSE PRACTITIONER

## 2022-03-21 PROCEDURE — 3080F DIAST BP >= 90 MM HG: CPT | Mod: CPTII,S$GLB,, | Performed by: NURSE PRACTITIONER

## 2022-03-21 PROCEDURE — 3288F PR FALLS RISK ASSESSMENT DOCUMENTED: ICD-10-PCS | Mod: CPTII,S$GLB,, | Performed by: NURSE PRACTITIONER

## 2022-03-21 PROCEDURE — 1160F PR REVIEW ALL MEDS BY PRESCRIBER/CLIN PHARMACIST DOCUMENTED: ICD-10-PCS | Mod: CPTII,S$GLB,, | Performed by: NURSE PRACTITIONER

## 2022-03-21 PROCEDURE — 3288F FALL RISK ASSESSMENT DOCD: CPT | Mod: CPTII,S$GLB,, | Performed by: NURSE PRACTITIONER

## 2022-03-21 PROCEDURE — 3077F PR MOST RECENT SYSTOLIC BLOOD PRESSURE >= 140 MM HG: ICD-10-PCS | Mod: CPTII,S$GLB,, | Performed by: NURSE PRACTITIONER

## 2022-03-21 PROCEDURE — 3008F BODY MASS INDEX DOCD: CPT | Mod: CPTII,S$GLB,, | Performed by: NURSE PRACTITIONER

## 2022-03-21 PROCEDURE — 3077F SYST BP >= 140 MM HG: CPT | Mod: CPTII,S$GLB,, | Performed by: NURSE PRACTITIONER

## 2022-03-21 PROCEDURE — 4010F ACE/ARB THERAPY RXD/TAKEN: CPT | Mod: CPTII,S$GLB,, | Performed by: NURSE PRACTITIONER

## 2022-03-21 NOTE — PROGRESS NOTES
Urogyn follow up  [unfilled]  .  [facility] - UROGYNECOLOGY  [address] [address]    Manisha Wynne  [id number]  1950      Manisha Wynne is a 71 y.o.  here for a urogyn follow up mixed urinary incontinence.    Last HPI from 05/27/2020 ([place])     1)  UI:  (--) BRIAN (occasional) <  (+) UUI  X 2years. Usually worst in the morning. Goes to bed around 11, gets up around 1 to urinate, then has to urinate first thing in the am or she will leak when she gets out of bed. (+) pads 0- 1/day, only wears one when she travels, usually minimum wetness and 0/night usually minimum wetness.  Daytime frequency: Q 2 hours.  Nocturia: Yes: 2/night.   (--) dysuria,  (--) hematuria,  (--) frequent UTIs.  (+) incomplete bladder emptying. Will sit on toilet and wait for another urge.      2)  POP:  Absent. above introitus.  Symptoms:(--).  (--) vaginal bleeding. (--) vaginal discharge. (--) sexually active.  (--) dyspareunia.  (--)  Vaginal dryness.  (--) vaginal estrogen use.      3)  BM:  (--) constipation/straining.  (--) chronic diarrhea. (--) hematochezia.  (--) fecal incontinence.  (--) fecal smearing/urgency.  (--) incomplete evacuation.     11/17/2021  1)  UI:  (+) BRIAN  <  (+) UUI.   (--) pads:  Daytime frequency: Q 3 hours.  Nocturia: Yes: 1/night. Goes to bed at MD, wakes around 330-430 and 630 to urinate.   (--) dysuria,  (--) hematuria,  (--) frequent UTIs.  (--) complete bladder emptying. Urgency in worse when she has diarrhea from C.  Diff.  --started pelvic floor PT-- did not complete due to covid. Didn't do enough to tell if helped.   --has not looked at diet for triggers     2)  POP:  Absent. above introitus.  Symptoms:(--).  (--) vaginal bleeding. (--) vaginal discharge. (--) sexually active.  (--) dyspareunia.  (--)  Vaginal dryness.  (--) vaginal estrogen use.  Concern for enlarged bartholin's gland on L side  Did not use vaginal estrogen cream due to cost.      3)  BM:   (--) constipation/straining.  (--) chronic diarrhea. (--) hematochezia.  (--) fecal incontinence.  (--) fecal smearing/urgency.  (--) incomplete evacuation.       4) C5-C6 cervical stenosis and myelopathy.   --laminectomy was planned, but cancelled due to C. Diff     5) C. Diff:  --was on antibiotics for cat scratch  --diagnosed 06/2021 prior to spinal surgery  --currently taking vancomycin; this is her 3th round              --followed by ID  --currently having normal consistency  --was having watery stool with c diff    Changes from last visit:  1)  Mixed urinary incontinence, urge > stress:    --bladder symptoms are worse when BM are not controlled  --did not start [facility] pelvic floor PT : (p) [phone] OR [phone]. **Use vaginal suppositories before and/or after PT if having pain  --rare UUI--not using pads unless she has diarrhea     2) Vaginal atrophy (dryness):   --using vaginal estrogen cream     3) Nocturia (nighttime urination):  --nocturia-- denies  --only getting up at 5/6 in the AM  --does limit fluids prior to bedtime       4) H/o constipation/straining, now with c diff:  --in clinical trial   --follow up with GI and infectious disease  --eating yogurt with probiotics and kefir.           Past Medical History:   Diagnosis Date    Abnormal Pap smear     Atrial fibrillation     AV block, 1st degree 7/25/2012    Cataract     Depression 7/24/2012    Facet arthritis of lumbar region 3/31/2015    Falls     3 falls in the last 6 mos--noted 6/19/19    Hyperlipidemia     Hypertension 7/24/2012    Neuropathy     Other specified cardiac dysrhythmias(427.89)     Sleep apnea     Syncope 7/24/2012       Past Surgical History:   Procedure Laterality Date    APPLICATION OF CARTILAGE GRAFT Bilateral 12/19/2019    Procedure: APPLICATION, CARTILAGE GRAFT AURICULAR JEZ;  Surgeon: Martinez Flores III, MD;  [facility];  Service: ENT;  Laterality: Bilateral;    CARDIAC PACEMAKER PLACEMENT   2012    [id number]    DILATION AND CURETTAGE OF UTERUS      Hx of precancerous cells?    ENDOSCOPIC ULTRASOUND OF UPPER GASTROINTESTINAL TRACT N/A 2019    Procedure: ULTRASOUND, UPPER GI TRACT, ENDOSCOPIC;  Surgeon: Kev Calderon MD;  Location: [facility] (2ND FLR);  Service: Endoscopy;  Laterality: N/A;  Pacemaker-Adam   appt confirmed-rb    MYELOGRAPHY N/A 2021    Procedure: Myelogram  CERVICAL, THORACIC AND LUMBAR;  Surgeon: [facility] Diagnostic Provider;  Location: [facility] OR 2ND FLR;  Service: Radiology;  Laterality: N/A;    NASAL SEPTOPLASTY N/A 2019    Procedure: SEPTOPLASTY, NOSE;  Surgeon: Martinez Flores III, MD;  Location: [facility];  Service: ENT;  Laterality: N/A;  FOLLOW DR SALVATORE KIMBROUGH PROTOCOL    OPEN REDUCTION AND INTERNAL FIXATION (ORIF) OF FRACTURE OF DISTAL RADIUS Left 2020    Procedure: ORIF, FRACTURE, RADIUS, DISTAL-left;  Surgeon: Ellen Moe MD;  Location[facility];  Service: Orthopedics;  Laterality: Left;    SURGICAL REMOVAL OF NASAL TURBINATE Bilateral 2019    Procedure: EXCISION, NASAL TURBINATE;  Surgeon: Martinez Flores III, MD;  Location: [facility];  Service: ENT;  Laterality: Bilateral;    TONSILLECTOMY      WISDOM TOOTH EXTRACTION         Family History   Adopted: Yes   Problem Relation Age of Onset    Amblyopia Neg Hx     Blindness Neg Hx     Cataracts Neg Hx     Glaucoma Neg Hx     Macular degeneration Neg Hx     Retinal detachment Neg Hx        Social History     Socioeconomic History    Marital status:    Occupational History    Occupation: /Rabbi     Employer: [employer]   Tobacco Use    Smoking status: Former Smoker     Packs/day: 0.25     Years: 15.00     Pack years: 3.75     Quit date: 1982     Years since quittin.6    Smokeless tobacco: Former User   Substance and Sexual Activity    Alcohol use: Yes     Comment: occasional    Drug use: No    Sexual activity: Not  Currently     Social Determinants of Health     Financial Resource Strain: Low Risk     Difficulty of Paying Living Expenses: Not very hard   Food Insecurity: No Food Insecurity    Worried About Running Out of Food in the Last Year: Never true    Ran Out of Food in the Last Year: Never true   Transportation Needs: No Transportation Needs    Lack of Transportation (Medical): No    Lack of Transportation (Non-Medical): No   Physical Activity: Insufficiently Active    Days of Exercise per Week: 2 days    Minutes of Exercise per Session: 60 min   Stress: Stress Concern Present    Feeling of Stress : To some extent   Social Connections: Moderately Integrated    Frequency of Communication with Friends and Family: More than three times a week    Frequency of Social Gatherings with Friends and Family: Once a week    Attends Anglican Services: More than 4 times per year    Active Member of Clubs or Organizations: Yes    Attends Club or Organization Meetings: More than 4 times per year    Marital Status:    Housing Stability: Low Risk     Unable to Pay for Housing in the Last Year: No    Number of Places Lived in the Last Year: 1    Unstable Housing in the Last Year: No       Current Outpatient Medications   Medication Sig Dispense Refill    aspirin (ECOTRIN) 81 MG EC tablet Take 81 mg by mouth. 1 Tablet, Delayed Release (E.C.) Oral Every day      buPROPion (WELLBUTRIN XL) 300 MG 24 hr tablet Take 300 mg by mouth once daily.       calcium-vitamin D 600 mg(1,500mg) -200 unit Tab Take 1 tablet by mouth once daily.       cholecalciferol, vitamin D3, 125 mcg (5,000 unit) Tab Take 5,000 Units by mouth once daily.      estradioL (ESTRACE) 0.01 % (0.1 mg/gram) vaginal cream Place 1 g vaginally twice a week. 42.5 g 2    FLUoxetine 40 MG capsule Take 40 mg by mouth once daily.      lisdexamfetamine (VYVANSE) 70 MG capsule Take 1 capsule (70 mg total) by mouth every morning. 30 capsule 0    losartan  "(COZAAR) 25 MG tablet TAKE 1 TABLET EVERY DAY 90 tablet 6    metoprolol tartrate (LOPRESSOR) 25 MG tablet TAKE 1 TABLET TWICE DAILY 180 tablet 3    pravastatin (PRAVACHOL) 20 MG tablet TAKE 1 TABLET EVERY DAY 90 tablet 3    prednisolon/gatiflox/bromfenac (PREDNISOL ACE-GATIFLOX-BROMFEN) 1-0.5-0.075 % DrpS Apply 1 drop to eye 3 (three) times daily. in operative eye for 1 month after surgery 5 mL 3    PROLIA 60 mg/mL Syrg        Current Facility-Administered Medications   Medication Dose Route Frequency Provider Last Rate Last Admin    sodium chloride 0.9% flush 10 mL  10 mL Intravenous PRN Ines Aguilera MD           Review of patient's allergies indicates:  No Known Allergies    Well woman:  Last pap: 2018 -- normal  Last mammogram:10/2021 normal  Colonoscopy: 08/2015--normal-- due 2025--GI wants to do it sooner due to c. diff  DEXA:06/2020 osteoporosis   There is a 18% risk of a major osteoporotic fracture and a 3.6% risk of hip fracture in the next 10 years (FRAX).  On prolia        ROS:  As per HPI.      Exam  BP (!) 142/90 (BP Location: Right arm, Patient Position: Sitting, BP Method: Medium (Manual))   Ht 5' 8" (1.727 m)   Wt 70.6 kg (155 lb 10.3 oz)   LMP 10/01/2003   BMI 23.67 kg/m²   General: alert and oriented, no acute distress  Respiratory: normal respiratory effort  Abd: soft, non-tender, non-distended    Pelvic--deferred.    Impression  No diagnosis found.  We reviewed the above issues and discussed options for short-term versus long-term management of her problems.   Plan:   1)  Mixed urinary incontinence, urge > stress:    -- Empty bladder every 3 hours.  Empty well: wait a minute, lean forward on toilet.  -- Drink more water and avoid bladder irritants  -- Avoid dietary irritants (see sheet).  Keep diary x 3-5 days to determine your irritants.  --KEGELS: do 10 in AM and 10 in PM, holding each x 10 seconds.  When you feel urge to go, STOP, KEGEL, and when urge has passed, then go to bathroom. "   -- Consider PT after spine surgery clinical trial completed  --URGE: consider anticholinergic if behavioral modifications are not sufficient    --STRESS:  Pessary vs. Sling.      2) Vaginal atrophy (dryness):   ---Vaginal atrophy (dryness):  Before bed, use 1 gram of estrogen cream in vagina twice a week   --using this can help bladder urgency/frequency and prevent UTIs     3) Nocturia (nighttime urination):  -- Stop fluids 2 hours before bed.   -- no water by bed  -- If have leg swelling:  Elevate feet above chest x 1 hour before bed to get excess fluid off.   -- Can also use support hose (knee highs).       4) H/o constipation/straining, now with c diff:  --follow up with GI and infectious disease  --continue probiotics  --complete clinical trial  --if you wear pads/ pullups-- change pad every time you pull your pants down     5)  RTC 6 months for follow up     I spent a total of 20 minutes on the day of the visit.  This includes face to face time and non-face to face time preparing to see the patient (eg, review of tests), obtaining and/or reviewing separately obtained history, documenting clinical information in the electronic or other health record, independently interpreting results and communicating results to the patient/family/caregiver, or care coordinator.    Jessica Messer, ANNELIESE-BC  [facility]  Division of Female Pelvic Medicine and Reconstructive Surgery  Department of Obstetrics & Gynecology

## 2022-03-21 NOTE — PATIENT INSTRUCTIONS
1)  Mixed urinary incontinence, urge > stress:    -- Empty bladder every 3 hours.  Empty well: wait a minute, lean forward on toilet.  -- Drink more water and avoid bladder irritants  -- Avoid dietary irritants (see sheet).  Keep diary x 3-5 days to determine your irritants.  --KEGELS: do 10 in AM and 10 in PM, holding each x 10 seconds.  When you feel urge to go, STOP, KEGEL, and when urge has passed, then go to bathroom.   -- Consider PT after spine surgery clinical trial completed  --URGE: consider anticholinergic if behavioral modifications are not sufficient    --STRESS:  Pessary vs. Sling.      2) Vaginal atrophy (dryness):   ---Vaginal atrophy (dryness):  Before bed, use 1 gram of estrogen cream in vagina twice a week   --using this can help bladder urgency/frequency and prevent UTIs     3) Nocturia (nighttime urination):  -- Stop fluids 2 hours before bed.   -- no water by bed  -- If have leg swelling:  Elevate feet above chest x 1 hour before bed to get excess fluid off.   -- Can also use support hose (knee highs).       4) H/o constipation/straining, now with c diff:  --follow up with GI and infectious disease  --continue probiotics  --complete clinical trial  --if you wear pads/ pullups-- change pad every time you pull your pants down     5)  RTC 6 months for follow up

## 2022-03-22 ENCOUNTER — CLINICAL SUPPORT (OUTPATIENT)
Dept: CARDIOLOGY | Facility: HOSPITAL | Age: 72
End: 2022-03-22
Attending: INTERNAL MEDICINE
Payer: MEDICARE

## 2022-03-22 ENCOUNTER — OFFICE VISIT (OUTPATIENT)
Dept: HEPATOLOGY | Facility: CLINIC | Age: 72
End: 2022-03-22
Payer: MEDICARE

## 2022-03-22 VITALS
WEIGHT: 156.75 LBS | DIASTOLIC BLOOD PRESSURE: 66 MMHG | HEIGHT: 68 IN | BODY MASS INDEX: 23.76 KG/M2 | SYSTOLIC BLOOD PRESSURE: 133 MMHG | RESPIRATION RATE: 18 BRPM | TEMPERATURE: 98 F | HEART RATE: 76 BPM | OXYGEN SATURATION: 94 %

## 2022-03-22 DIAGNOSIS — I49.5 SSS (SICK SINUS SYNDROME): ICD-10-CM

## 2022-03-22 DIAGNOSIS — R74.8 ELEVATED LIVER ENZYMES: Primary | ICD-10-CM

## 2022-03-22 DIAGNOSIS — Z95.0 CARDIAC PACEMAKER IN SITU: Primary | ICD-10-CM

## 2022-03-22 PROCEDURE — 99214 PR OFFICE/OUTPT VISIT, EST, LEVL IV, 30-39 MIN: ICD-10-PCS | Mod: S$GLB,,, | Performed by: NURSE PRACTITIONER

## 2022-03-22 PROCEDURE — 1126F PR PAIN SEVERITY QUANTIFIED, NO PAIN PRESENT: ICD-10-PCS | Mod: CPTII,S$GLB,, | Performed by: NURSE PRACTITIONER

## 2022-03-22 PROCEDURE — 1160F RVW MEDS BY RX/DR IN RCRD: CPT | Mod: CPTII,S$GLB,, | Performed by: NURSE PRACTITIONER

## 2022-03-22 PROCEDURE — 1160F PR REVIEW ALL MEDS BY PRESCRIBER/CLIN PHARMACIST DOCUMENTED: ICD-10-PCS | Mod: CPTII,S$GLB,, | Performed by: NURSE PRACTITIONER

## 2022-03-22 PROCEDURE — 1157F PR ADVANCE CARE PLAN OR EQUIV PRESENT IN MEDICAL RECORD: ICD-10-PCS | Mod: CPTII,S$GLB,, | Performed by: NURSE PRACTITIONER

## 2022-03-22 PROCEDURE — 1157F ADVNC CARE PLAN IN RCRD: CPT | Mod: CPTII,S$GLB,, | Performed by: NURSE PRACTITIONER

## 2022-03-22 PROCEDURE — 93280 PM DEVICE PROGR EVAL DUAL: CPT

## 2022-03-22 PROCEDURE — 3075F PR MOST RECENT SYSTOLIC BLOOD PRESS GE 130-139MM HG: ICD-10-PCS | Mod: CPTII,S$GLB,, | Performed by: NURSE PRACTITIONER

## 2022-03-22 PROCEDURE — 1101F PR PT FALLS ASSESS DOC 0-1 FALLS W/OUT INJ PAST YR: ICD-10-PCS | Mod: CPTII,S$GLB,, | Performed by: NURSE PRACTITIONER

## 2022-03-22 PROCEDURE — 3008F BODY MASS INDEX DOCD: CPT | Mod: CPTII,S$GLB,, | Performed by: NURSE PRACTITIONER

## 2022-03-22 PROCEDURE — 4010F PR ACE/ARB THEARPY RXD/TAKEN: ICD-10-PCS | Mod: CPTII,S$GLB,, | Performed by: NURSE PRACTITIONER

## 2022-03-22 PROCEDURE — 1126F AMNT PAIN NOTED NONE PRSNT: CPT | Mod: CPTII,S$GLB,, | Performed by: NURSE PRACTITIONER

## 2022-03-22 PROCEDURE — 93280 CARDIAC DEVICE CHECK - IN CLINIC & HOSPITAL: ICD-10-PCS | Mod: 26,,, | Performed by: INTERNAL MEDICINE

## 2022-03-22 PROCEDURE — 3075F SYST BP GE 130 - 139MM HG: CPT | Mod: CPTII,S$GLB,, | Performed by: NURSE PRACTITIONER

## 2022-03-22 PROCEDURE — 3008F PR BODY MASS INDEX (BMI) DOCUMENTED: ICD-10-PCS | Mod: CPTII,S$GLB,, | Performed by: NURSE PRACTITIONER

## 2022-03-22 PROCEDURE — 3078F PR MOST RECENT DIASTOLIC BLOOD PRESSURE < 80 MM HG: ICD-10-PCS | Mod: CPTII,S$GLB,, | Performed by: NURSE PRACTITIONER

## 2022-03-22 PROCEDURE — 3078F DIAST BP <80 MM HG: CPT | Mod: CPTII,S$GLB,, | Performed by: NURSE PRACTITIONER

## 2022-03-22 PROCEDURE — 3288F FALL RISK ASSESSMENT DOCD: CPT | Mod: CPTII,S$GLB,, | Performed by: NURSE PRACTITIONER

## 2022-03-22 PROCEDURE — 1159F PR MEDICATION LIST DOCUMENTED IN MEDICAL RECORD: ICD-10-PCS | Mod: CPTII,S$GLB,, | Performed by: NURSE PRACTITIONER

## 2022-03-22 PROCEDURE — 1101F PT FALLS ASSESS-DOCD LE1/YR: CPT | Mod: CPTII,S$GLB,, | Performed by: NURSE PRACTITIONER

## 2022-03-22 PROCEDURE — 1159F MED LIST DOCD IN RCRD: CPT | Mod: CPTII,S$GLB,, | Performed by: NURSE PRACTITIONER

## 2022-03-22 PROCEDURE — 99999 PR PBB SHADOW E&M-EST. PATIENT-LVL V: CPT | Mod: PBBFAC,,, | Performed by: NURSE PRACTITIONER

## 2022-03-22 PROCEDURE — 99999 PR PBB SHADOW E&M-EST. PATIENT-LVL V: ICD-10-PCS | Mod: PBBFAC,,, | Performed by: NURSE PRACTITIONER

## 2022-03-22 PROCEDURE — 93280 PM DEVICE PROGR EVAL DUAL: CPT | Mod: 26,,, | Performed by: INTERNAL MEDICINE

## 2022-03-22 PROCEDURE — 3288F PR FALLS RISK ASSESSMENT DOCUMENTED: ICD-10-PCS | Mod: CPTII,S$GLB,, | Performed by: NURSE PRACTITIONER

## 2022-03-22 PROCEDURE — 99214 OFFICE O/P EST MOD 30 MIN: CPT | Mod: S$GLB,,, | Performed by: NURSE PRACTITIONER

## 2022-03-22 PROCEDURE — 4010F ACE/ARB THERAPY RXD/TAKEN: CPT | Mod: CPTII,S$GLB,, | Performed by: NURSE PRACTITIONER

## 2022-03-22 NOTE — PATIENT INSTRUCTIONS
1.  Will check immunity markers for Hepatitis A and B and arrange for vaccination if needed  2. Labs today to  check for multiple causes of liver disease. These labs will release to you as soon as they are resulted but we will discuss them in detail at your upcoming visit to discuss what the lab results mean.   3. Follow up based on results of above

## 2022-03-22 NOTE — PROGRESS NOTES
[facility] VISIT NEW PT NOTE    REFERRING PROVIDER:  Dr. Gerson Smiley  PCP: Iris Blue MD     CHIEF COMPLAINT: elevated liver enzymes    HPI: This is a 71 y.o. White female with PMH noted below, presenting for evaluation of   elevated liver enzymes    Previous serologic w/u negative for hemochromatosis and viral hepatitis A, B and C - will complete sero w/u    Prior serologic workup:   Lab Results   Component Value Date    IGGSERUM 1092 12/30/2021    FERRITIN 144 06/10/2021    FESATURATED 14 (L) 06/10/2021    HEPBSAG Negative 01/19/2018    HEPCAB Negative 07/12/2021    HEPAIGM Negative 01/19/2018       Liver fibrosis staging:  -- cannot do fibroscan due to pacemaker    Risk factors for NAFLD include HTN, HLD    Interval HPI: Presents today alone. On statin since 2014. Does have recurrent Cdiff, did have a flare in December around the time of elevated liver labs - currently in Cdiff clinical trial   No use of Herbal medications, no  New medications or med changes     Labs done 1/2022 show elevated transaminase levels (fluctuating levels, elevated x2 since 12/2021, previously mostly WNL)  Platelets WNL, alk phos WNL  Synthetic liver functioning WNL    Lab Results   Component Value Date    ALT 50 (H) 01/31/2022    AST 40 01/31/2022    ALKPHOS 57 01/31/2022    BILITOT 0.7 01/31/2022    ALBUMIN 4.0 01/31/2022    INR 1.0 06/10/2021     12/30/2021       Abd CT done 1/2022 showed possible subCM cyst, no mention of fatty liver     Denies family history of liver disease. Denies significant alcohol consumption currently or in the past  Social History     Substance and Sexual Activity   Alcohol Use Yes    Comment: no daily or heavy use, ~4 times per month       Immunity to Hep A and B - will check today        Allergy and medication list reviewed and updated     PMHX:  has a past medical history of Abnormal Pap smear, Atrial fibrillation, AV block, 1st degree (7/25/2012), Cataract, Depression  "(7/24/2012), Facet arthritis of lumbar region (3/31/2015), Falls, Hyperlipidemia, Hypertension (7/24/2012), Neuropathy, Other specified cardiac dysrhythmias(427.89), Sleep apnea, and Syncope (7/24/2012).    PSHX:  has a past surgical history that includes Tonsillectomy; Dilation and curettage of uterus; Cardiac pacemaker placement (09/07/2012); Endoscopic ultrasound of upper gastrointestinal tract (N/A, 7/5/2019); [place] tooth extraction; Nasal septoplasty (N/A, 12/19/2019); Application of cartilage graft (Bilateral, 12/19/2019); Surgical removal of nasal turbinate (Bilateral, 12/19/2019); Open reduction and internal fixation (ORIF) of fracture of distal radius (Left, 1/24/2020); and Myelography (N/A, 5/4/2021).    FAMILY HISTORY: Updated and reviewed in [facility]    SOCIAL HISTORY:   Social History     Substance and Sexual Activity   Alcohol Use Yes    Comment: no daily or heavy use, ~4 times per month       Social History     Substance and Sexual Activity   Drug Use No       ROS:   GENERAL: Denies fatigue  CARDIOVASCULAR: Denies edema  GI: Denies abdominal pain  SKIN: Denies rash, itching   NEURO: Denies confusion, memory loss, or mood changes    PHYSICAL EXAM:   Friendly White female, in no acute distress; alert and oriented to person, place and time  VITALS: /66 (BP Location: Right arm, Patient Position: Sitting, BP Method: Medium (Automatic))   Pulse 76   Temp 98.1 °F (36.7 °C) (Oral)   Resp 18   Ht 5' 8" (1.727 m)   Wt 71.1 kg (156 lb 12 oz)   LMP 10/01/2003   SpO2 (!) 94%   BMI 23.83 kg/m²   EYES: Sclerae anicteric  GI: Soft, non-tender, non-distended. No ascites.  EXTREMITIES:  No edema.  SKIN: Warm and dry. No jaundice. No telangectasias noted. No palmar erythema.  NEURO:  No asterixis.  PSYCH:  Thought and speech pattern appropriate. Behavior normal      EDUCATION:  See instructions discussed with patient in Instructions section of the After Visit Summary     ASSESSMENT & PLAN:  71 y.o. White " female with:  1. Elevated liver enzymes   -- Labs note elevated transaminase levels x2 since 12/2021  --- synthetic liver function WNL  --- Abd CT done noted possible liver cyst, no mention of fatty liver   -- do not suspect any medications contributing   --- Previous serologic w/u negative for hemochromatosis and viral hepatitis A, B and C - will complete sero w/u  --- Hep A and B immunity: will check today, will arrange Hep A and B vaccines if needed    -- cannot have fibroscan because of pacemaker  -- labs today  Orders Placed This Encounter   Procedures    Alpha-1-Antitrypsin    LESIA Screen w/Reflex    Antimitochondrial Antibody    Anti-Smooth Muscle Antibody    Ceruloplasmin    CK    Hepatic Function Panel    IgG    Hepatitis A antibody, IgG    Hepatitis B Core Antibody, Total    Hepatitis B Surface Ab, Qualitative          Labs today then   Follow up for pending results of workup.   Orders Placed This Encounter   Procedures    Alpha-1-Antitrypsin    LESIA Screen w/Reflex    Antimitochondrial Antibody    Anti-Smooth Muscle Antibody    Ceruloplasmin    CK    Hepatic Function Panel    IgG    Hepatitis A antibody, IgG    Hepatitis B Core Antibody, Total    Hepatitis B Surface Ab, Qualitative        Thank you for allowing me to participate in the care of JULIETA Vazquez    I spent a total of 30 minutes on the day of the visit.This includes face to face time and non-face to face time preparing to see the patient (eg, review of tests), obtaining and/or reviewing separately obtained history, documenting clinical information in the electronic or other health record, independently interpreting results and communicating results to the patient/family/caregiver, and coordinating care.     ADDENDUM 3/28/2022: Liver enzymes normal, workup negative. No f/u needed         CC'ed note to:   Gerson Smiley MD Stacy D Siegendorf, MD

## 2022-03-28 ENCOUNTER — PATIENT MESSAGE (OUTPATIENT)
Dept: HEPATOLOGY | Facility: CLINIC | Age: 72
End: 2022-03-28
Payer: MEDICARE

## 2022-03-28 DIAGNOSIS — Z23 NEED FOR HEPATITIS A AND B VACCINATION: Primary | ICD-10-CM

## 2022-03-29 ENCOUNTER — PATIENT MESSAGE (OUTPATIENT)
Dept: HEPATOLOGY | Facility: CLINIC | Age: 72
End: 2022-03-29
Payer: MEDICARE

## 2022-03-29 ENCOUNTER — TELEPHONE (OUTPATIENT)
Dept: HEPATOLOGY | Facility: CLINIC | Age: 72
End: 2022-03-29
Payer: MEDICARE

## 2022-03-29 NOTE — TELEPHONE ENCOUNTER
Please call pt today and notify her of the following     -- All liver labs are normal. Negative for all genetic and autoimmune disease    - liver enzymes normal so no hepatology f/u needed. I canceled per appt this week because she does not need any follow up   -- I sent her a message on [employer] if she has any questions and wishes to respond     Thanks !

## 2022-03-29 NOTE — TELEPHONE ENCOUNTER
Contacted patient. Relayed provider messages. Patient voiced understood. She said she is happy and thankful.    Messages:  Please call pt today and notify her of the following      -- All liver labs are normal. Negative for all genetic and autoimmune disease    - liver enzymes normal so no hepatology f/u needed. I canceled per appt this week because she does not need any follow up   -- I sent her a message on [employer] if she has any questions and wishes to respond

## 2022-04-06 ENCOUNTER — RESEARCH ENCOUNTER (OUTPATIENT)
Dept: RESEARCH | Facility: HOSPITAL | Age: 72
End: 2022-04-06
Payer: MEDICARE

## 2022-04-06 NOTE — PROGRESS NOTES
Study Name: A Phase 3 Open-Label Clinical Study to Evaluate the Safety and Tolerability of Rebiotix [id number] (microbiota suspension) in Subjects with Recurrent Clostridium difficile Infection; IRB#[id number]   Sponsor: Dawna  PI: AL Coker     Follow up week 8 visit     Subject: [id number]     Date: 06APR2022    Ms. Manisha Wynne is seen in clinic today for week 8 follow up.  received FMT by enema per the study on 09FEB2022 for a history of recurrent C.Diff. She has overall been doing well since the enema. Reports on average has one type 4 bowel movement a day. Has had days with 2-3 type 5, but resolves and no reports of watery diarrhea. No changes to medication and no AE/GRABIEL's assessed. Denies any new problems complaints, no change to medications, no ED/Urgent care visits.      Per study weight obtained, QOL survey completed.       Current Outpatient Medications:     aspirin (ECOTRIN) 81 MG EC tablet, Take 81 mg by mouth. 1 Tablet, Delayed Release (E.C.) Oral Every day, Disp: , Rfl:     buPROPion (WELLBUTRIN XL) 300 MG 24 hr tablet, Take 300 mg by mouth once daily. , Disp: , Rfl:     calcium-vitamin D 600 mg(1,500mg) -200 unit Tab, Take 1 tablet by mouth once daily. , Disp: , Rfl:     cholecalciferol, vitamin D3, 125 mcg (5,000 unit) Tab, Take 5,000 Units by mouth once daily., Disp: , Rfl:     estradioL (ESTRACE) 0.01 % (0.1 mg/gram) vaginal cream, Place 1 g vaginally twice a week., Disp: 42.5 g, Rfl: 2    FLUoxetine 40 MG capsule, Take 40 mg by mouth once daily., Disp: , Rfl:     lisdexamfetamine (VYVANSE) 70 MG capsule, Take 1 capsule (70 mg total) by mouth every morning., Disp: 30 capsule, Rfl: 0    losartan (COZAAR) 25 MG tablet, TAKE 1 TABLET EVERY DAY, Disp: 90 tablet, Rfl: 6    metoprolol tartrate (LOPRESSOR) 25 MG tablet, TAKE 1 TABLET TWICE DAILY, Disp: 180 tablet, Rfl: 3    pravastatin (PRAVACHOL) 20 MG tablet, TAKE 1 TABLET EVERY DAY, Disp: 90 tablet, Rfl: 3     prednisolon/gatiflox/bromfenac (PREDNISOL ACE-GATIFLOX-BROMFEN) 1-0.5-0.075 % DrpS, Apply 1 drop to eye 3 (three) times daily. in operative eye for 1 month after surgery, Disp: 5 mL, Rfl: 3    PROLIA 60 mg/mL Syrg, , Disp: , Rfl:     Current Facility-Administered Medications:     sodium chloride 0.9% flush 10 mL, 10 mL, Intravenous, PRN, Ines Aguilera MD    Discussed that now since 8 weeks post FMT and no return of C.Diff consider this a treatment success. Will have phone call follow up 4 month and month. Instructed to call ID research if s&s of C.Diff return.      PLAN  1. If watery diarrhea greater than 4 times in 24 hours call to notify ID at [phone]  2. Phone call follow up for 4 month visit (approximately 6/1/22) - Research team will call

## 2022-04-13 ENCOUNTER — TELEPHONE (OUTPATIENT)
Dept: OPHTHALMOLOGY | Facility: CLINIC | Age: 72
End: 2022-04-13
Payer: MEDICARE

## 2022-04-13 NOTE — TELEPHONE ENCOUNTER
Left message for patient letting her know her second surgery has been moved to June 20 due to  being out

## 2022-04-18 ENCOUNTER — TELEPHONE (OUTPATIENT)
Dept: OPHTHALMOLOGY | Facility: CLINIC | Age: 72
End: 2022-04-18
Payer: MEDICARE

## 2022-04-18 DIAGNOSIS — H25.11 NUCLEAR SCLEROTIC CATARACT OF RIGHT EYE: Primary | ICD-10-CM

## 2022-04-22 ENCOUNTER — PATIENT MESSAGE (OUTPATIENT)
Dept: HEPATOLOGY | Facility: CLINIC | Age: 72
End: 2022-04-22
Payer: MEDICARE

## 2022-04-25 ENCOUNTER — PATIENT MESSAGE (OUTPATIENT)
Dept: ADMINISTRATIVE | Facility: OTHER | Age: 72
End: 2022-04-25
Payer: MEDICARE

## 2022-04-26 ENCOUNTER — PATIENT MESSAGE (OUTPATIENT)
Dept: PHARMACY | Facility: CLINIC | Age: 72
End: 2022-04-26
Payer: MEDICARE

## 2022-04-26 ENCOUNTER — CLINICAL SUPPORT (OUTPATIENT)
Dept: CARDIOLOGY | Facility: HOSPITAL | Age: 72
End: 2022-04-26
Attending: INTERNAL MEDICINE
Payer: MEDICARE

## 2022-04-26 DIAGNOSIS — Z95.0 CARDIAC PACEMAKER IN SITU: ICD-10-CM

## 2022-04-26 DIAGNOSIS — I49.5 SSS (SICK SINUS SYNDROME): ICD-10-CM

## 2022-04-26 PROCEDURE — 93280 PM DEVICE PROGR EVAL DUAL: CPT

## 2022-04-26 PROCEDURE — 93280 CARDIAC DEVICE CHECK - IN CLINIC & HOSPITAL: ICD-10-PCS | Mod: 26,,, | Performed by: INTERNAL MEDICINE

## 2022-04-26 PROCEDURE — 93280 PM DEVICE PROGR EVAL DUAL: CPT | Mod: 26,,, | Performed by: INTERNAL MEDICINE

## 2022-04-28 ENCOUNTER — TELEPHONE (OUTPATIENT)
Dept: OPHTHALMOLOGY | Facility: CLINIC | Age: 72
End: 2022-04-28
Payer: MEDICARE

## 2022-04-28 DIAGNOSIS — H25.12 NUCLEAR SCLEROTIC CATARACT OF LEFT EYE: Primary | ICD-10-CM

## 2022-05-02 ENCOUNTER — OFFICE VISIT (OUTPATIENT)
Dept: PODIATRY | Facility: CLINIC | Age: 72
End: 2022-05-02
Payer: MEDICARE

## 2022-05-02 VITALS
HEART RATE: 64 BPM | BODY MASS INDEX: 23.72 KG/M2 | DIASTOLIC BLOOD PRESSURE: 68 MMHG | WEIGHT: 156.5 LBS | HEIGHT: 68 IN | SYSTOLIC BLOOD PRESSURE: 122 MMHG

## 2022-05-02 DIAGNOSIS — G60.8 SENSORY PERIPHERAL NEUROPATHY: Primary | ICD-10-CM

## 2022-05-02 DIAGNOSIS — M20.42 HAMMER TOES OF BOTH FEET: ICD-10-CM

## 2022-05-02 DIAGNOSIS — M20.41 HAMMER TOES OF BOTH FEET: ICD-10-CM

## 2022-05-02 DIAGNOSIS — B35.1 ONYCHOMYCOSIS DUE TO DERMATOPHYTE: ICD-10-CM

## 2022-05-02 PROCEDURE — 1157F ADVNC CARE PLAN IN RCRD: CPT | Mod: CPTII,S$GLB,, | Performed by: PODIATRIST

## 2022-05-02 PROCEDURE — 99213 PR OFFICE/OUTPT VISIT, EST, LEVL III, 20-29 MIN: ICD-10-PCS | Mod: S$GLB,,, | Performed by: PODIATRIST

## 2022-05-02 PROCEDURE — 4010F ACE/ARB THERAPY RXD/TAKEN: CPT | Mod: CPTII,S$GLB,, | Performed by: PODIATRIST

## 2022-05-02 PROCEDURE — 1159F PR MEDICATION LIST DOCUMENTED IN MEDICAL RECORD: ICD-10-PCS | Mod: CPTII,S$GLB,, | Performed by: PODIATRIST

## 2022-05-02 PROCEDURE — 3074F SYST BP LT 130 MM HG: CPT | Mod: CPTII,S$GLB,, | Performed by: PODIATRIST

## 2022-05-02 PROCEDURE — 3008F BODY MASS INDEX DOCD: CPT | Mod: CPTII,S$GLB,, | Performed by: PODIATRIST

## 2022-05-02 PROCEDURE — 99999 PR PBB SHADOW E&M-EST. PATIENT-LVL III: CPT | Mod: PBBFAC,,, | Performed by: PODIATRIST

## 2022-05-02 PROCEDURE — 1157F PR ADVANCE CARE PLAN OR EQUIV PRESENT IN MEDICAL RECORD: ICD-10-PCS | Mod: CPTII,S$GLB,, | Performed by: PODIATRIST

## 2022-05-02 PROCEDURE — 3008F PR BODY MASS INDEX (BMI) DOCUMENTED: ICD-10-PCS | Mod: CPTII,S$GLB,, | Performed by: PODIATRIST

## 2022-05-02 PROCEDURE — 1126F PR PAIN SEVERITY QUANTIFIED, NO PAIN PRESENT: ICD-10-PCS | Mod: CPTII,S$GLB,, | Performed by: PODIATRIST

## 2022-05-02 PROCEDURE — 1126F AMNT PAIN NOTED NONE PRSNT: CPT | Mod: CPTII,S$GLB,, | Performed by: PODIATRIST

## 2022-05-02 PROCEDURE — 99999 PR PBB SHADOW E&M-EST. PATIENT-LVL III: ICD-10-PCS | Mod: PBBFAC,,, | Performed by: PODIATRIST

## 2022-05-02 PROCEDURE — 3078F DIAST BP <80 MM HG: CPT | Mod: CPTII,S$GLB,, | Performed by: PODIATRIST

## 2022-05-02 PROCEDURE — 99213 OFFICE O/P EST LOW 20 MIN: CPT | Mod: S$GLB,,, | Performed by: PODIATRIST

## 2022-05-02 PROCEDURE — 3078F PR MOST RECENT DIASTOLIC BLOOD PRESSURE < 80 MM HG: ICD-10-PCS | Mod: CPTII,S$GLB,, | Performed by: PODIATRIST

## 2022-05-02 PROCEDURE — 4010F PR ACE/ARB THEARPY RXD/TAKEN: ICD-10-PCS | Mod: CPTII,S$GLB,, | Performed by: PODIATRIST

## 2022-05-02 PROCEDURE — 1159F MED LIST DOCD IN RCRD: CPT | Mod: CPTII,S$GLB,, | Performed by: PODIATRIST

## 2022-05-02 PROCEDURE — 3074F PR MOST RECENT SYSTOLIC BLOOD PRESSURE < 130 MM HG: ICD-10-PCS | Mod: CPTII,S$GLB,, | Performed by: PODIATRIST

## 2022-05-02 NOTE — PROGRESS NOTES
Subjective:      Patient ID: Manisha Wynne is a 71 y.o. female.    Chief Complaint:   Nail Care    Manisha is a 71 y.o. female who presents to the clinic for evaluation and treatment of ingrown toenail.  Manisha has a past medical history of Abnormal Pap smear, Atrial fibrillation, AV block, 1st degree (7/25/2012), Cataract, Depression (7/24/2012), Facet arthritis of lumbar region (3/31/2015), Falls, Hyperlipidemia, Hypertension (7/24/2012), Neuropathy, Other specified cardiac dysrhythmias(427.89), Sleep apnea, and Syncope (7/24/2012). The patient's chief complaint nail care .  This patient has documented high risk feet requiring routine maintenance secondary to peripheral neuropathy.     Pt relates she is going to [facility] to be honored as a rabi and she will be doing some walking.  She went to have her feet checked before then.    Patient relates she did get some walking shoes but mainly likes her Crocs      PCP: Iris Blue MD    Date Last Seen by PCP: 10/22/21    Current shoe gear:  Affected Foot: Casual shoes     Unaffected Foot: Casual shoes        Hemoglobin A1C   Date Value Ref Range Status   06/10/2021 5.4 4.0 - 5.6 % Final     Comment:     ADA Screening Guidelines:  5.7-6.4%  Consistent with prediabetes  >or=6.5%  Consistent with diabetes    High levels of fetal hemoglobin interfere with the HbA1C  assay. Heterozygous hemoglobin variants (HbS, HgC, etc)do  not significantly interfere with this assay.   However, presence of multiple variants may affect accuracy.     06/10/2021 5.4 4.0 - 5.6 % Final     Comment:     ADA Screening Guidelines:  5.7-6.4%  Consistent with prediabetes  >or=6.5%  Consistent with diabetes    High levels of fetal hemoglobin interfere with the HbA1C  assay. Heterozygous hemoglobin variants (HbS, HgC, etc)do  not significantly interfere with this assay.   However, presence of multiple variants may affect accuracy.     11/29/2019 5.2 4.0 - 5.6 % Final      Comment:     ADA Screening Guidelines:  5.7-6.4%  Consistent with prediabetes  >or=6.5%  Consistent with diabetes  High levels of fetal hemoglobin interfere with the HbA1C  assay. Heterozygous hemoglobin variants (HbS, HgC, etc)do  not significantly interfere with this assay.   However, presence of multiple variants may affect accuracy.          Past Medical History:   Diagnosis Date    Abnormal Pap smear     Atrial fibrillation     AV block, 1st degree 7/25/2012    Cataract     Depression 7/24/2012    Facet arthritis of lumbar region 3/31/2015    Falls     3 falls in the last 6 mos--noted 6/19/19    Hyperlipidemia     Hypertension 7/24/2012    Neuropathy     Other specified cardiac dysrhythmias(427.89)     Sleep apnea     Syncope 7/24/2012     Past Surgical History:   Procedure Laterality Date    APPLICATION OF CARTILAGE GRAFT Bilateral 12/19/2019    Procedure: APPLICATION, CARTILAGE GRAFT AURICULAR JEZ;  Surgeon: Martinez Flores III, MD;  Location: [facility];  Service: ENT;  Laterality: Bilateral;    CARDIAC PACEMAKER PLACEMENT  09/07/2012    [id number]    DILATION AND CURETTAGE OF UTERUS      Hx of precancerous cells?    ENDOSCOPIC ULTRASOUND OF UPPER GASTROINTESTINAL TRACT N/A 7/5/2019    Procedure: ULTRASOUND, UPPER GI TRACT, ENDOSCOPIC;  Surgeon: Kev Calderon MD;  Location: [facility] (2ND FLR);  Service: Endoscopy;  Laterality: N/A;  Pacemaker-Adam   appt confirmed-rb    MYELOGRAPHY N/A 5/4/2021    Procedure: Myelogram  CERVICAL, THORACIC AND LUMBAR;  Surgeon: [facility] Diagnostic Provider;  Location: [facility] 2ND FLR;  Service: Radiology;  Laterality: N/A;    NASAL SEPTOPLASTY N/A 12/19/2019    Procedure: SEPTOPLASTY, NOSE;  Surgeon: Martinez Flores III, MD;  Location: [facility];  Service: ENT;  Laterality: N/A;  FOLLOW DR SALVATORE KIMBROUGH PROTOCOL    OPEN REDUCTION AND INTERNAL FIXATION (ORIF) OF FRACTURE OF DISTAL RADIUS Left 1/24/2020    Procedure: ORIF, FRACTURE, RADIUS,  DISTAL-left;  Surgeon: Ellen Moe MD;  Location: [facility] OR;  Service: Orthopedics;  Laterality: Left;    SURGICAL REMOVAL OF NASAL TURBINATE Bilateral 12/19/2019    Procedure: EXCISION, NASAL TURBINATE;  Surgeon: Martinez Flores III, MD;  Location: [facility] OR;  Service: ENT;  Laterality: Bilateral;    TONSILLECTOMY      WISDOM TOOTH EXTRACTION       Current Outpatient Medications on File Prior to Visit   Medication Sig Dispense Refill    aspirin (ECOTRIN) 81 MG EC tablet Take 81 mg by mouth. 1 Tablet, Delayed Release (E.C.) Oral Every day      buPROPion (WELLBUTRIN XL) 300 MG 24 hr tablet Take 300 mg by mouth once daily.       calcium-vitamin D 600 mg(1,500mg) -200 unit Tab Take 1 tablet by mouth once daily.       cholecalciferol, vitamin D3, 125 mcg (5,000 unit) Tab Take 5,000 Units by mouth once daily.      estradioL (ESTRACE) 0.01 % (0.1 mg/gram) vaginal cream Place 1 g vaginally twice a week. 42.5 g 2    FLUoxetine 40 MG capsule Take 40 mg by mouth once daily.      lisdexamfetamine (VYVANSE) 70 MG capsule Take 1 capsule (70 mg total) by mouth every morning. 30 capsule 0    losartan (COZAAR) 25 MG tablet TAKE 1 TABLET EVERY DAY 90 tablet 6    metoprolol tartrate (LOPRESSOR) 25 MG tablet TAKE 1 TABLET TWICE DAILY 180 tablet 3    pravastatin (PRAVACHOL) 20 MG tablet TAKE 1 TABLET EVERY DAY 90 tablet 3    prednisolon/gatiflox/bromfenac (PREDNISOL ACE-GATIFLOX-BROMFEN) 1-0.5-0.075 % DrpS Apply 1 drop to eye 3 (three) times daily. in operative eye for 1 month after surgery 5 mL 3    PROLIA 60 mg/mL Syrg        Current Facility-Administered Medications on File Prior to Visit   Medication Dose Route Frequency Provider Last Rate Last Admin    sodium chloride 0.9% flush 10 mL  10 mL Intravenous PRN Ines Aguilera MD         Review of patient's allergies indicates:  No Known Allergies    Review of Systems   Constitutional: Negative for chills, decreased appetite, fever, malaise/fatigue, night sweats,  "weight gain and weight loss.   Cardiovascular: Negative for chest pain, claudication, dyspnea on exertion, leg swelling, palpitations and syncope.   Respiratory: Negative for cough and shortness of breath.    Endocrine: Negative for cold intolerance and heat intolerance.   Hematologic/Lymphatic: Negative for bleeding problem. Does not bruise/bleed easily.   Skin: Positive for nail changes. Negative for color change, dry skin, flushing, itching, poor wound healing, rash, skin cancer, suspicious lesions and unusual hair distribution.   Musculoskeletal: Positive for arthritis and stiffness. Negative for back pain, falls, gout, joint pain, joint swelling, muscle cramps, muscle weakness, myalgias and neck pain.   Gastrointestinal: Negative for diarrhea, nausea and vomiting.   Neurological: Positive for numbness and paresthesias. Negative for dizziness, focal weakness, light-headedness, tremors, vertigo and weakness.   Psychiatric/Behavioral: Negative for altered mental status and depression. The patient does not have insomnia.    Allergic/Immunologic: Negative.            Objective:       Vitals:    05/02/22 0920   BP: 122/68   Pulse: 64   Weight: 71 kg (156 lb 8.4 oz)   Height: 5' 8" (1.727 m)   PainSc: 0-No pain   71 kg (156 lb 8.4 oz) afeb    Physical Exam  Vitals reviewed.   Constitutional:       General: She is not in acute distress.     Appearance: She is well-developed. She is not ill-appearing, toxic-appearing or diaphoretic.      Comments: Proper supportive shoegear   Cardiovascular:      Pulses:           Dorsalis pedis pulses are 2+ on the right side and 2+ on the left side.        Posterior tibial pulses are 1+ on the right side and 1+ on the left side.      Comments: No edema to digits  Musculoskeletal:         General: Deformity present. No tenderness.      Right lower leg: No edema.      Left lower leg: No edema.      Right foot: Decreased range of motion. Deformity and prominent metatarsal heads present. " No tenderness or bony tenderness.      Left foot: Decreased range of motion. Deformity and prominent metatarsal heads present. No tenderness or bony tenderness.      Comments:    Semi- Reducible extensor and flexor contractures at the MTPJ and PIPJ of toes 2-5, bilat.     No pain on palpation   Feet:      Right foot:      Protective Sensation: 10 sites tested. 0 sites sensed.      Skin integrity: Dry skin present. No ulcer, blister, skin breakdown, erythema, warmth or callus.      Toenail Condition: Right toenails are abnormally thick and long. Fungal disease present.     Left foot:      Protective Sensation: 10 sites tested. 0 sites sensed.      Skin integrity: Dry skin present. No ulcer, blister, skin breakdown, erythema, warmth or callus.      Toenail Condition: Left toenails are abnormally thick and long. Fungal disease present.     Comments:  Toenails 1-5 bilaterally are elongated by 2-3 mm, thickened by 2-3 mm, discolored/yellowed, dystrophic, brittle with subungual debris.    Subungual right distal lateral 3rd digit with mild HPK formation no signs of infection     No IS macerations      Skin:     General: Skin is warm and dry.      Capillary Refill: Capillary refill takes 2 to 3 seconds.      Coloration: Skin is not pale.      Findings: No erythema or rash.      Nails: There is clubbing.      Comments:          Neurological:      Mental Status: She is alert and oriented to person, place, and time.      Sensory: Sensory deficit present.      Motor: Atrophy present.      Gait: Gait abnormal.   Psychiatric:         Attention and Perception: Attention normal.         Mood and Affect: Mood normal.         Speech: Speech normal.         Thought Content: Thought content normal.         Cognition and Memory: Cognition normal.         Judgment: Judgment normal.                    Assessment:       Encounter Diagnoses   Name Primary?    Sensory peripheral neuropathy Yes    Onychomycosis due to dermatophyte      Hammer toes of both feet          Plan:       Manisha was seen today for nail care.    Diagnoses and all orders for this visit:    Sensory peripheral neuropathy    Onychomycosis due to dermatophyte    Hammer toes of both feet      I counseled the patient on her conditions, their implications and medical management.    - continue hammertoe crest pads; monitor skin for any breakdown    - Shoe inspection. Patient instructed on proper foot hygeine. We discussed wearing proper shoe gear, daily foot inspections, never walking without protective shoe gear, never putting sharp instruments to feet, routine podiatric nail visits every 2-3 months.      - With patient's permission, nails were aggressively reduced and debrided x 10 to their soft tissue attachment mechanically  removing all offending nail and debris. Patient relates relief following the procedure.       - patient to monitor for any bleeding under the callused area and return sooner this would indicate an ulcer; discussed with patient due to neuropathy may or may not have pain     - reviewed proper walking shoes for [place]     - 3 months or sooner if needed

## 2022-05-09 NOTE — PROGRESS NOTES
"Individual Psychotherapy (PhD/LCSW)    11/27/2017    Site: [facility]    Therapeutic Intervention: Met with patient alone.  Outpatient - Insight oriented psychotherapy 45 min - CPT code 62337    Chief complaint/reason for encounter: depression, anxiety, distractibility     Interval history and content of current session: Pt has been getting to work within 5 minutes of "on time" this past week, feels good about it, has been firm in making herself leave the house on time. However she is not increasing her pt numbers. Discuss her reluctance to do very brief visits, her habit of busying herself with things like e-mails in the mornings to avoid starting her visits, and feeling overwhelmed by the pt lists. Suggest some things for pt to try but encourage her to ask colleagues how they get their numbers up, address reluctance to be open with colleagues about these issues.    Treatment plan:  · Target symptoms: depression, anxiety, distractibility  · Why chosen therapy is appropriate versus another modality: relevant to diagnosis  · Outcome monitoring methods: self-report  · Therapeutic intervention type: insight oriented psychotherapy    Risk parameters:  Patient reports no suicidal ideation  Patient reports no homicidal ideation  Patient reports no self-injurious behavior  Patient reports no violent behavior    Verbal deficits: None    Patient's response to intervention:  The patient's response to intervention is motivated    Progress toward goals and other mental status changes:  The patient's progress toward goals is good    Diagnosis: Major depression, 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

## 2022-05-11 RX ORDER — DICYCLOMINE HYDROCHLORIDE 20 MG/1
20 TABLET ORAL EVERY 6 HOURS
Status: ON HOLD | COMMUNITY
End: 2022-12-13 | Stop reason: SDUPTHER

## 2022-05-11 RX ORDER — GABAPENTIN 100 MG/1
100 CAPSULE ORAL 3 TIMES DAILY
Status: ON HOLD | COMMUNITY
End: 2022-12-13 | Stop reason: SDUPTHER

## 2022-05-12 ENCOUNTER — TELEPHONE (OUTPATIENT)
Dept: OPHTHALMOLOGY | Facility: CLINIC | Age: 72
End: 2022-05-12
Payer: MEDICARE

## 2022-05-12 NOTE — TELEPHONE ENCOUNTER
Patient given arrival time of 8:30 am on Monday May 16. Nothing to eat or drink after 9 pm.  Water, Gatorade after 9 pm until leaves home.  Start drops into the operative eye today. [address]

## 2022-05-16 ENCOUNTER — HOSPITAL ENCOUNTER (OUTPATIENT)
Facility: OTHER | Age: 72
Discharge: HOME | End: 2022-05-16
Attending: OPHTHALMOLOGY | Admitting: OPHTHALMOLOGY
Payer: MEDICARE

## 2022-05-16 ENCOUNTER — ANESTHESIA EVENT (OUTPATIENT)
Dept: SURGERY | Facility: OTHER | Age: 72
End: 2022-05-16
Payer: MEDICARE

## 2022-05-16 ENCOUNTER — ANESTHESIA (OUTPATIENT)
Dept: SURGERY | Facility: OTHER | Age: 72
End: 2022-05-16
Payer: MEDICARE

## 2022-05-16 ENCOUNTER — TELEPHONE (OUTPATIENT)
Dept: ENDOSCOPY | Facility: HOSPITAL | Age: 72
End: 2022-05-16
Payer: MEDICARE

## 2022-05-16 VITALS
TEMPERATURE: 98 F | WEIGHT: 156 LBS | BODY MASS INDEX: 23.64 KG/M2 | HEART RATE: 68 BPM | DIASTOLIC BLOOD PRESSURE: 63 MMHG | HEIGHT: 68 IN | SYSTOLIC BLOOD PRESSURE: 109 MMHG | RESPIRATION RATE: 18 BRPM | OXYGEN SATURATION: 100 %

## 2022-05-16 DIAGNOSIS — H25.11 NUCLEAR SCLEROTIC CATARACT OF RIGHT EYE: Primary | ICD-10-CM

## 2022-05-16 DIAGNOSIS — H25.13 NUCLEAR SCLEROSIS, BILATERAL: ICD-10-CM

## 2022-05-16 DIAGNOSIS — K86.2 PANCREATIC CYST: Primary | ICD-10-CM

## 2022-05-16 PROCEDURE — 36000706: Performed by: OPHTHALMOLOGY

## 2022-05-16 PROCEDURE — 66999 UNLISTED PX ANT SEGMENT EYE: CPT | Mod: CSM,RT,, | Performed by: OPHTHALMOLOGY

## 2022-05-16 PROCEDURE — 37000008 HC ANESTHESIA 1ST 15 MINUTES: Performed by: OPHTHALMOLOGY

## 2022-05-16 PROCEDURE — 66984 XCAPSL CTRC RMVL W/O ECP: CPT | Mod: RT,,, | Performed by: OPHTHALMOLOGY

## 2022-05-16 PROCEDURE — 63600175 PHARM REV CODE 636 W HCPCS: Performed by: NURSE ANESTHETIST, CERTIFIED REGISTERED

## 2022-05-16 PROCEDURE — 36000707: Performed by: OPHTHALMOLOGY

## 2022-05-16 PROCEDURE — 71000015 HC POSTOP RECOV 1ST HR: Performed by: OPHTHALMOLOGY

## 2022-05-16 PROCEDURE — V2632 POST CHMBR INTRAOCULAR LENS: HCPCS | Mod: WS | Performed by: OPHTHALMOLOGY

## 2022-05-16 PROCEDURE — 37000009 HC ANESTHESIA EA ADD 15 MINS: Performed by: OPHTHALMOLOGY

## 2022-05-16 PROCEDURE — 66999 PR FEMTO LRI: ICD-10-PCS | Mod: CSM,RT,, | Performed by: OPHTHALMOLOGY

## 2022-05-16 PROCEDURE — 66984 PR REMOVAL, CATARACT, W/INSRT INTRAOC LENS, W/O ENDO CYCLO: ICD-10-PCS | Mod: RT,,, | Performed by: OPHTHALMOLOGY

## 2022-05-16 PROCEDURE — 25000003 PHARM REV CODE 250: Performed by: OPHTHALMOLOGY

## 2022-05-16 DEVICE — LENS EYHANCE +18.5D: Type: IMPLANTABLE DEVICE | Site: EYE | Status: FUNCTIONAL

## 2022-05-16 RX ORDER — LIDOCAINE HYDROCHLORIDE 40 MG/ML
INJECTION, SOLUTION RETROBULBAR
Status: DISCONTINUED | OUTPATIENT
Start: 2022-05-16 | End: 2022-05-16 | Stop reason: HOSPADM

## 2022-05-16 RX ORDER — MOXIFLOXACIN 5 MG/ML
SOLUTION/ DROPS OPHTHALMIC
Status: DISCONTINUED | OUTPATIENT
Start: 2022-05-16 | End: 2022-05-16 | Stop reason: HOSPADM

## 2022-05-16 RX ORDER — MIDAZOLAM HYDROCHLORIDE 1 MG/ML
INJECTION INTRAMUSCULAR; INTRAVENOUS
Status: DISCONTINUED | OUTPATIENT
Start: 2022-05-16 | End: 2022-05-16

## 2022-05-16 RX ORDER — MOXIFLOXACIN 5 MG/ML
1 SOLUTION/ DROPS OPHTHALMIC
Status: COMPLETED | OUTPATIENT
Start: 2022-05-16 | End: 2022-05-16

## 2022-05-16 RX ORDER — PHENYLEPHRINE HYDROCHLORIDE 25 MG/ML
1 SOLUTION/ DROPS OPHTHALMIC
Status: COMPLETED | OUTPATIENT
Start: 2022-05-16 | End: 2022-05-16

## 2022-05-16 RX ORDER — TROPICAMIDE 10 MG/ML
1 SOLUTION/ DROPS OPHTHALMIC
Status: COMPLETED | OUTPATIENT
Start: 2022-05-16 | End: 2022-05-16

## 2022-05-16 RX ORDER — OXYCODONE HYDROCHLORIDE 5 MG/1
5 TABLET ORAL
Status: DISCONTINUED | OUTPATIENT
Start: 2022-05-16 | End: 2022-05-16

## 2022-05-16 RX ORDER — PROCHLORPERAZINE EDISYLATE 5 MG/ML
5 INJECTION INTRAMUSCULAR; INTRAVENOUS EVERY 30 MIN PRN
Status: DISCONTINUED | OUTPATIENT
Start: 2022-05-16 | End: 2022-05-16

## 2022-05-16 RX ORDER — TETRACAINE HYDROCHLORIDE 5 MG/ML
SOLUTION OPHTHALMIC
Status: DISCONTINUED | OUTPATIENT
Start: 2022-05-16 | End: 2022-05-16 | Stop reason: HOSPADM

## 2022-05-16 RX ORDER — DIPHENHYDRAMINE HYDROCHLORIDE 50 MG/ML
12.5 INJECTION INTRAMUSCULAR; INTRAVENOUS EVERY 30 MIN PRN
Status: DISCONTINUED | OUTPATIENT
Start: 2022-05-16 | End: 2022-05-16

## 2022-05-16 RX ORDER — SODIUM CHLORIDE 0.9 % (FLUSH) 0.9 %
3 SYRINGE (ML) INJECTION
Status: DISCONTINUED | OUTPATIENT
Start: 2022-05-16 | End: 2022-05-16 | Stop reason: HOSPADM

## 2022-05-16 RX ORDER — MEPERIDINE HYDROCHLORIDE 25 MG/ML
12.5 INJECTION INTRAMUSCULAR; INTRAVENOUS; SUBCUTANEOUS ONCE AS NEEDED
Status: DISCONTINUED | OUTPATIENT
Start: 2022-05-16 | End: 2022-05-16

## 2022-05-16 RX ORDER — PHENYLEPHRINE HYDROCHLORIDE 100 MG/ML
1 SOLUTION/ DROPS OPHTHALMIC ONCE
Status: COMPLETED | OUTPATIENT
Start: 2022-05-16 | End: 2022-05-16

## 2022-05-16 RX ORDER — ACETAMINOPHEN 325 MG/1
650 TABLET ORAL EVERY 4 HOURS PRN
Status: DISCONTINUED | OUTPATIENT
Start: 2022-05-16 | End: 2022-05-16 | Stop reason: HOSPADM

## 2022-05-16 RX ORDER — HYDROMORPHONE HYDROCHLORIDE 2 MG/ML
0.4 INJECTION, SOLUTION INTRAMUSCULAR; INTRAVENOUS; SUBCUTANEOUS EVERY 5 MIN PRN
Status: DISCONTINUED | OUTPATIENT
Start: 2022-05-16 | End: 2022-05-16

## 2022-05-16 RX ORDER — TETRACAINE HYDROCHLORIDE 5 MG/ML
1 SOLUTION OPHTHALMIC
Status: COMPLETED | OUTPATIENT
Start: 2022-05-16 | End: 2022-05-16

## 2022-05-16 RX ORDER — PROPARACAINE HYDROCHLORIDE 5 MG/ML
1 SOLUTION/ DROPS OPHTHALMIC
Status: DISCONTINUED | OUTPATIENT
Start: 2022-05-16 | End: 2022-05-16 | Stop reason: HOSPADM

## 2022-05-16 RX ADMIN — MOXIFLOXACIN 1 DROP: 5 SOLUTION/ DROPS OPHTHALMIC at 08:05

## 2022-05-16 RX ADMIN — PHENYLEPHRINE HYDROCHLORIDE 1 DROP: 25 SOLUTION/ DROPS OPHTHALMIC at 09:05

## 2022-05-16 RX ADMIN — MOXIFLOXACIN 1 DROP: 5 SOLUTION/ DROPS OPHTHALMIC at 09:05

## 2022-05-16 RX ADMIN — MIDAZOLAM HYDROCHLORIDE 1 MG: 1 INJECTION, SOLUTION INTRAMUSCULAR; INTRAVENOUS at 10:05

## 2022-05-16 RX ADMIN — MOXIFLOXACIN 1 DROP: 5 SOLUTION/ DROPS OPHTHALMIC at 11:05

## 2022-05-16 RX ADMIN — PHENYLEPHRINE HYDROCHLORIDE 1 DROP: 25 SOLUTION/ DROPS OPHTHALMIC at 08:05

## 2022-05-16 RX ADMIN — TROPICAMIDE 1 DROP: 10 SOLUTION/ DROPS OPHTHALMIC at 08:05

## 2022-05-16 RX ADMIN — TROPICAMIDE 1 DROP: 10 SOLUTION/ DROPS OPHTHALMIC at 09:05

## 2022-05-16 RX ADMIN — TETRACAINE HYDROCHLORIDE 1 DROP: 5 SOLUTION OPHTHALMIC at 08:05

## 2022-05-16 RX ADMIN — TETRACAINE HYDROCHLORIDE 1 DROP: 5 SOLUTION OPHTHALMIC at 09:05

## 2022-05-16 NOTE — ANESTHESIA POSTPROCEDURE EVALUATION
Anesthesia Post Evaluation    Patient: Manisha Wynne    Procedure(s) Performed: Procedure(s) (LRB):  EXTRACTION, CATARACT, WITH IOL INSERTION (Right)    Final Anesthesia Type: MAC      Patient location during evaluation: [facility]  Patient participation: Yes- Able to Participate  Level of consciousness: awake and alert  Post-procedure vital signs: reviewed and stable  Pain management: adequate  Airway patency: patent    PONV status at discharge: No PONV  Anesthetic complications: no      Cardiovascular status: blood pressure returned to baseline  Respiratory status: unassisted, spontaneous ventilation and room air  Hydration status: euvolemic  Follow-up not needed.  Comments: Report called to RN on 4th floor.          Vitals Value Taken Time   /60 05/16/22 0904   Temp 36.6 °C (97.9 °F) 05/16/22 0904   Pulse 69 05/16/22 0904   Resp 18 05/16/22 0904   SpO2 100 % 05/16/22 0904         No case tracking events are documented in the log.      Pain/Corie Score: No data recorded

## 2022-05-16 NOTE — PLAN OF CARE
Manisha Wynne has met all discharge criteria from Phase II. Vital Signs are stable, ambulating  without difficulty. Discharge instructions given, patient verbalized understanding. Discharged from facility via wheelchair in stable condition.

## 2022-05-16 NOTE — ANESTHESIA PREPROCEDURE EVALUATION
05/16/2022  Manisha Wynne is a 71 y.o., female.    Pre-op Assessment    I have reviewed the Patient Summary Reports.    I have reviewed the Nursing Notes. I have reviewed the NPO Status.   I have reviewed the Medications.     Review of Systems  Anesthesia Hx:  No problems with previous Anesthesia   Denies Personal Hx of Anesthesia complications.   Social:  Former Smoker, Social Alcohol Use    Hematology/Oncology:     Oncology Normal    -- Anemia:   EENT/Dental:EENT/Dental Normal   Cardiovascular:   Pacemaker Hypertension, well controlled Dysrhythmias atrial fibrillation hyperlipidemia SSS. 1st degree AV block.   Pulmonary:   Sleep Apnea    Renal/:   Chronic Renal Disease, CRI    Hepatic/GI:  Hepatic/GI Normal    Musculoskeletal:  Spine Disorders: lumbar Degenerative disease    Neurological:   Neuromuscular Disease,   Peripheral Neuropathy    Endocrine:   Hyperparathyroidism.   Psych:   Psychiatric History depression          Physical Exam  General:  Well nourished      Airway/Jaw/Neck:  Airway Findings: Mouth Opening: Normal   Tongue: Normal   General Airway Assessment: Adult, Average Mallampati: II  TM Distance: Normal, at least 6 cm   Jaw/Neck Findings:  Neck ROM: Normal ROM       Dental:  Dental Findings: In tact, Molar caps          Mental Status:  Mental Status Findings:  Cooperative, Alert and Oriented         Anesthesia Plan  Type of Anesthesia, risks & benefits discussed:  Anesthesia Type:  MAC    Patient's Preference:   Plan Factors:          Intra-op Monitoring Plan: standard ASA monitors  Intra-op Monitoring Plan Comments:   Post Op Pain Control Plan: per primary service following discharge from PACU and multimodal analgesia  Post Op Pain Control Plan Comments:     Induction:    Beta Blocker:         Informed Consent: Informed consent signed with the Patient and all parties understand  the risks and agree with anesthesia plan.  All questions answered.  Anesthesia consent signed with patient.  ASA Score: 3     Day of Surgery Review of History & Physical:              Ready For Surgery From Anesthesia Perspective.           Physical Exam  General: Well nourished    Airway:  Mallampati: II   Mouth Opening: Normal  TM Distance: Normal, at least 6 cm  Tongue: Normal  Neck ROM: Normal ROM    Dental:  In tact, Molar caps          Anesthesia Plan  Type of Anesthesia, risks & benefits discussed:    Anesthesia Type: MAC  Intra-op Monitoring Plan: standard ASA monitors  Post Op Pain Control Plan: per primary service following discharge from PACU and multimodal analgesia  Informed Consent: Informed consent signed with the Patient and all parties understand the risks and agree with anesthesia plan.  All questions answered.   ASA Score: 3    Ready For Surgery From Anesthesia Perspective.       .

## 2022-05-16 NOTE — DISCHARGE SUMMARY
Outcome: Successful outpatient ophthalmic surgical procedure  Preprinted Instructions given to patient.  Regular diet.  Activity: No restrictions  Meds: see Med Rec  Condition: stable  Follow up: 1 day with Dr Aguilera  Disposition: Home  Diagnosis: s/p eye surgery

## 2022-05-16 NOTE — TELEPHONE ENCOUNTER
----- Message from Kev Calderon MD sent at 5/16/2022  8:34 AM CDT -----  Yes please  ----- Message -----  From: Anna Bartlett MA  Sent: 5/15/2022   8:47 PM CDT  To: Kev Calderon MD    She has a pacemaker and cannot do MRI. Do you want an EUS?  ----- Message -----  From: Anna Bartlett MA  Sent: 5/14/2022  12:00 AM CDT  To: Anna Bartlett MA

## 2022-05-16 NOTE — OP NOTE
SURGEON:  Ines Aguilera M.D.    PREOPERATIVE DIAGNOSIS:    Nuclear Sclerotic Cataract Right Eye    POSTOPERATIVE DIAGNOSIS:    Nuclear Sclerotic Cataract Right Eye    PROCEDURES:    Phacoemulsification with  intraocular lens, Right eye (28022)  With Femtosecond LASER assist    DATE OF SURGERY: 05/16/2022    IMPLANT: diboo 18.5    ANESTHESIA:  MAC with topical Lidocaine    COMPLICATIONS:  None    ESTIMATED BLOOD LOSS: None    SPECIMENS: None    INDICATIONS:    The patient has a history of painless progressive visual loss and difficulty with activities of daily living, which specifically include difficult driving at night due to glare and difficulty reading small print, secondary to cataract formation.  After a thorough discussion of the risks, benefits, and alternatives to cataract surgery, including, but not limited to, the rare risks of infection, retinal detachment, hemorrhage, need for additional surgery, loss of vision, and even loss of the eye, the patient voices understanding and desires to proceed.    DESCRIPTION OF PROCEDURE:    After verification of consent and marking of the operative eye, the patient was positioned under the femtosecond LASER. Topical anesthetic drops were administered. A surgical timeout was initiated with verification of patient identifiers and the laser surgical plan. The eye was docked securely and the laser portion of the cataract procedure was carried out without complication.  The patient was returned to the pre-operative area to await the intraocular surgical portion of the cataract procedure.  The patients IOL calculations were reviewed, and the lens selection confirmed.   After verification and marking of the proper eye in the preop holding area, the patient was brought to the operating room in supine position where the eye was prepped and draped in standard sterile fashion with 5% Betadine and a lid speculum placed in the eye.   Topical 4% Lidocaine was used in addition to the  preoperative anesthesia and the procedure was begun by the creation of a paracentesis incision through which viscoelastic was used to fill the anterior chamber.  Next, a keratome blade was used to create a triplanar temporal clear corneal incision and a cystotome and Utrata forceps used to fashion a continuous curvilinear capsulorrhexis.  Hydrodissection was carried out using the [employer] hydrodissection cannula and the nucleus was found to be mobile.  Phacoemulsification of the nucleus was carried out using a quick chop technique, and all remaining epinuclear and cortical material was removed.  The eye was then reformed with Viscoelastic and ORA was used to verify the proper IOL power. The intraocular lens was then implanted into the capsular bag.  All remaining viscoelastics were removed from the eye and at the end of the case the pupil was round, the lens was well-centered within the capsular bag and all wounds were found to be water tight.  Drops of Vigamox and Pred Forte were instilled and a shield was placed over the eye. The patient will follow up with Dr. Aguilera in the morning.

## 2022-05-16 NOTE — PLAN OF CARE
F - Chiara and Belief: Pt is a [employer]    I - Importance: Yes, patient's chiara is an important source of strength for her    C - Community: Pt's friend, Lacy, is bedside with patient.     A - Address in Care:   provided initial visit prior to surgery with patient and patient's friend. 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 This  offered compassionate presence and reflective listening. No spiritual needs expressed at this time. Pt and pt's friend made aware of 's presence as needed.

## 2022-05-17 ENCOUNTER — OFFICE VISIT (OUTPATIENT)
Dept: OPHTHALMOLOGY | Facility: CLINIC | Age: 72
End: 2022-05-17
Attending: OPHTHALMOLOGY
Payer: MEDICARE

## 2022-05-17 DIAGNOSIS — H25.11 NUCLEAR SCLEROSIS OF RIGHT EYE: ICD-10-CM

## 2022-05-17 DIAGNOSIS — Z98.890 POST-OPERATIVE STATE: Primary | ICD-10-CM

## 2022-05-17 PROCEDURE — 3288F FALL RISK ASSESSMENT DOCD: CPT | Mod: CPTII,S$GLB,, | Performed by: OPHTHALMOLOGY

## 2022-05-17 PROCEDURE — 99999 PR PBB SHADOW E&M-EST. PATIENT-LVL III: ICD-10-PCS | Mod: PBBFAC,,, | Performed by: OPHTHALMOLOGY

## 2022-05-17 PROCEDURE — 1160F PR REVIEW ALL MEDS BY PRESCRIBER/CLIN PHARMACIST DOCUMENTED: ICD-10-PCS | Mod: CPTII,S$GLB,, | Performed by: OPHTHALMOLOGY

## 2022-05-17 PROCEDURE — 99024 POSTOP FOLLOW-UP VISIT: CPT | Mod: S$GLB,,, | Performed by: OPHTHALMOLOGY

## 2022-05-17 PROCEDURE — 1159F MED LIST DOCD IN RCRD: CPT | Mod: CPTII,S$GLB,, | Performed by: OPHTHALMOLOGY

## 2022-05-17 PROCEDURE — 1101F PT FALLS ASSESS-DOCD LE1/YR: CPT | Mod: CPTII,S$GLB,, | Performed by: OPHTHALMOLOGY

## 2022-05-17 PROCEDURE — 1157F PR ADVANCE CARE PLAN OR EQUIV PRESENT IN MEDICAL RECORD: ICD-10-PCS | Mod: CPTII,S$GLB,, | Performed by: OPHTHALMOLOGY

## 2022-05-17 PROCEDURE — 99999 PR PBB SHADOW E&M-EST. PATIENT-LVL III: CPT | Mod: PBBFAC,,, | Performed by: OPHTHALMOLOGY

## 2022-05-17 PROCEDURE — 4010F ACE/ARB THERAPY RXD/TAKEN: CPT | Mod: CPTII,S$GLB,, | Performed by: OPHTHALMOLOGY

## 2022-05-17 PROCEDURE — 1159F PR MEDICATION LIST DOCUMENTED IN MEDICAL RECORD: ICD-10-PCS | Mod: CPTII,S$GLB,, | Performed by: OPHTHALMOLOGY

## 2022-05-17 PROCEDURE — 1157F ADVNC CARE PLAN IN RCRD: CPT | Mod: CPTII,S$GLB,, | Performed by: OPHTHALMOLOGY

## 2022-05-17 PROCEDURE — 1160F RVW MEDS BY RX/DR IN RCRD: CPT | Mod: CPTII,S$GLB,, | Performed by: OPHTHALMOLOGY

## 2022-05-17 PROCEDURE — 4010F PR ACE/ARB THEARPY RXD/TAKEN: ICD-10-PCS | Mod: CPTII,S$GLB,, | Performed by: OPHTHALMOLOGY

## 2022-05-17 PROCEDURE — 1126F PR PAIN SEVERITY QUANTIFIED, NO PAIN PRESENT: ICD-10-PCS | Mod: CPTII,S$GLB,, | Performed by: OPHTHALMOLOGY

## 2022-05-17 PROCEDURE — 1126F AMNT PAIN NOTED NONE PRSNT: CPT | Mod: CPTII,S$GLB,, | Performed by: OPHTHALMOLOGY

## 2022-05-17 PROCEDURE — 3288F PR FALLS RISK ASSESSMENT DOCUMENTED: ICD-10-PCS | Mod: CPTII,S$GLB,, | Performed by: OPHTHALMOLOGY

## 2022-05-17 PROCEDURE — 99024 PR POST-OP FOLLOW-UP VISIT: ICD-10-PCS | Mod: S$GLB,,, | Performed by: OPHTHALMOLOGY

## 2022-05-17 PROCEDURE — 1101F PR PT FALLS ASSESS DOC 0-1 FALLS W/OUT INJ PAST YR: ICD-10-PCS | Mod: CPTII,S$GLB,, | Performed by: OPHTHALMOLOGY

## 2022-05-17 NOTE — PROGRESS NOTES
HPI     70 y/o female presents to clinic for 1 day post op OD    5/16/22 diboo 18.5 OD    Pt states [facility] is [facility]. Woke up with eye headache this morning     PGB TID OD    Last edited by Carrie Thomas on 5/17/2022  8:48 AM. (History)            Assessment /Plan     For exam results, see Encounter Report.    Post-operative state    Nuclear sclerosis of right eye      Slit lamp exam:  L/L: nl  K: clear, wound sealed  AC: 1+ cell  Lens: IOL centered and stable    POD1 s/p Phaco/IOL  Appropriate precautions and post op medications reviewed.  Patient instructed to call or come in if symptoms of redness, decreased vision, or pain are experienced.

## 2022-05-18 ENCOUNTER — TELEPHONE (OUTPATIENT)
Dept: OPHTHALMOLOGY | Facility: CLINIC | Age: 72
End: 2022-05-18
Payer: MEDICARE

## 2022-05-18 ENCOUNTER — TELEPHONE (OUTPATIENT)
Dept: ENDOSCOPY | Facility: HOSPITAL | Age: 72
End: 2022-05-18
Payer: MEDICARE

## 2022-05-18 NOTE — TELEPHONE ENCOUNTER
Received request to schedule patient for EUS. Spoke with Patient. Reviewed medical history and medications. Scheduled 5/20/22 at 10:15 am. Instructed on procedure and prep. Patient verbalized understanding of instructions. Copy of instructions sent via portal.

## 2022-05-18 NOTE — TELEPHONE ENCOUNTER
----- Message from Ruby Herrera sent at 5/18/2022 10:41 AM CDT -----  Contact: [phone]  Patient is calling stating that she rubbed her surgical eye on accident and is asking to speak to medical staff. Please contact patient to discuss at [phone].

## 2022-05-18 NOTE — TELEPHONE ENCOUNTER
Telephoned patient to schedule EUS with no answer. Left voicemail message with my direct contact number for patient to return call.

## 2022-05-19 ENCOUNTER — TELEPHONE (OUTPATIENT)
Dept: ENDOSCOPY | Facility: HOSPITAL | Age: 72
End: 2022-05-19
Payer: MEDICARE

## 2022-05-19 NOTE — TELEPHONE ENCOUNTER
Patient called to cancel EGD with Dr. Aguilera 5/20/22 due to having to work after the procedure at an earlier time. Not rescheduled at this time as patient has to coordinate her ride. Patient to call back to schedule.

## 2022-05-24 ENCOUNTER — OFFICE VISIT (OUTPATIENT)
Dept: OPHTHALMOLOGY | Facility: CLINIC | Age: 72
End: 2022-05-24
Attending: OPHTHALMOLOGY
Payer: MEDICARE

## 2022-05-24 DIAGNOSIS — Z98.890 POST-OPERATIVE STATE: Primary | ICD-10-CM

## 2022-05-24 DIAGNOSIS — H25.12 NUCLEAR SCLEROSIS OF LEFT EYE: ICD-10-CM

## 2022-05-24 DIAGNOSIS — H25.11 NUCLEAR SCLEROSIS OF RIGHT EYE: ICD-10-CM

## 2022-05-24 PROCEDURE — 1157F ADVNC CARE PLAN IN RCRD: CPT | Mod: CPTII,S$GLB,, | Performed by: OPHTHALMOLOGY

## 2022-05-24 PROCEDURE — 1126F AMNT PAIN NOTED NONE PRSNT: CPT | Mod: CPTII,S$GLB,, | Performed by: OPHTHALMOLOGY

## 2022-05-24 PROCEDURE — 1159F MED LIST DOCD IN RCRD: CPT | Mod: CPTII,S$GLB,, | Performed by: OPHTHALMOLOGY

## 2022-05-24 PROCEDURE — 1160F RVW MEDS BY RX/DR IN RCRD: CPT | Mod: CPTII,S$GLB,, | Performed by: OPHTHALMOLOGY

## 2022-05-24 PROCEDURE — 99999 PR PBB SHADOW E&M-EST. PATIENT-LVL III: CPT | Mod: PBBFAC,,, | Performed by: OPHTHALMOLOGY

## 2022-05-24 PROCEDURE — 92136 OPHTHALMIC BIOMETRY: CPT | Mod: 26,LT,S$GLB, | Performed by: OPHTHALMOLOGY

## 2022-05-24 PROCEDURE — 3288F FALL RISK ASSESSMENT DOCD: CPT | Mod: CPTII,S$GLB,, | Performed by: OPHTHALMOLOGY

## 2022-05-24 PROCEDURE — 99024 PR POST-OP FOLLOW-UP VISIT: ICD-10-PCS | Mod: S$GLB,,, | Performed by: OPHTHALMOLOGY

## 2022-05-24 PROCEDURE — 1126F PR PAIN SEVERITY QUANTIFIED, NO PAIN PRESENT: ICD-10-PCS | Mod: CPTII,S$GLB,, | Performed by: OPHTHALMOLOGY

## 2022-05-24 PROCEDURE — 1101F PT FALLS ASSESS-DOCD LE1/YR: CPT | Mod: CPTII,S$GLB,, | Performed by: OPHTHALMOLOGY

## 2022-05-24 PROCEDURE — 4010F PR ACE/ARB THEARPY RXD/TAKEN: ICD-10-PCS | Mod: CPTII,S$GLB,, | Performed by: OPHTHALMOLOGY

## 2022-05-24 PROCEDURE — 1159F PR MEDICATION LIST DOCUMENTED IN MEDICAL RECORD: ICD-10-PCS | Mod: CPTII,S$GLB,, | Performed by: OPHTHALMOLOGY

## 2022-05-24 PROCEDURE — 4010F ACE/ARB THERAPY RXD/TAKEN: CPT | Mod: CPTII,S$GLB,, | Performed by: OPHTHALMOLOGY

## 2022-05-24 PROCEDURE — 1101F PR PT FALLS ASSESS DOC 0-1 FALLS W/OUT INJ PAST YR: ICD-10-PCS | Mod: CPTII,S$GLB,, | Performed by: OPHTHALMOLOGY

## 2022-05-24 PROCEDURE — 92136 IOL MASTER - OU - BOTH EYES: ICD-10-PCS | Mod: 26,LT,S$GLB, | Performed by: OPHTHALMOLOGY

## 2022-05-24 PROCEDURE — 1160F PR REVIEW ALL MEDS BY PRESCRIBER/CLIN PHARMACIST DOCUMENTED: ICD-10-PCS | Mod: CPTII,S$GLB,, | Performed by: OPHTHALMOLOGY

## 2022-05-24 PROCEDURE — 99024 POSTOP FOLLOW-UP VISIT: CPT | Mod: S$GLB,,, | Performed by: OPHTHALMOLOGY

## 2022-05-24 PROCEDURE — 3288F PR FALLS RISK ASSESSMENT DOCUMENTED: ICD-10-PCS | Mod: CPTII,S$GLB,, | Performed by: OPHTHALMOLOGY

## 2022-05-24 PROCEDURE — 1157F PR ADVANCE CARE PLAN OR EQUIV PRESENT IN MEDICAL RECORD: ICD-10-PCS | Mod: CPTII,S$GLB,, | Performed by: OPHTHALMOLOGY

## 2022-05-24 PROCEDURE — 99999 PR PBB SHADOW E&M-EST. PATIENT-LVL III: ICD-10-PCS | Mod: PBBFAC,,, | Performed by: OPHTHALMOLOGY

## 2022-05-24 RX ORDER — PHENYLEPHRINE HYDROCHLORIDE 25 MG/ML
1 SOLUTION/ DROPS OPHTHALMIC
Status: CANCELLED | OUTPATIENT
Start: 2022-05-24

## 2022-05-24 RX ORDER — TROPICAMIDE 10 MG/ML
1 SOLUTION/ DROPS OPHTHALMIC
Status: CANCELLED | OUTPATIENT
Start: 2022-05-24

## 2022-05-24 RX ORDER — PHENYLEPHRINE HYDROCHLORIDE 100 MG/ML
1 SOLUTION/ DROPS OPHTHALMIC
Status: CANCELLED | OUTPATIENT
Start: 2022-05-24

## 2022-05-24 RX ORDER — TETRACAINE HYDROCHLORIDE 5 MG/ML
1 SOLUTION OPHTHALMIC
Status: CANCELLED | OUTPATIENT
Start: 2022-05-24

## 2022-05-24 RX ORDER — MOXIFLOXACIN 5 MG/ML
1 SOLUTION/ DROPS OPHTHALMIC
Status: CANCELLED | OUTPATIENT
Start: 2022-05-24

## 2022-05-24 NOTE — H&P (VIEW-ONLY)
HPI     70 y/o female presents to clinic for 1 week post op OD     5/16/22 diboo 18.5 OD    Pt states [facility] is good. Afraid she will run out of drops because she misses   sometimes     Last edited by Carrie Thomas on 5/24/2022  1:50 PM. (History)            Assessment /Plan     For exam results, see Encounter Report.    Post-operative state    Nuclear sclerosis of right eye      Slit lamp exam:  L/L: nl  K: clear, wound sealed  AC: trace cell  Iris/Lens: IOL centered and stable    POW1 s/p phaco: Surgery healing well with no signs of infection or abnormal inflammation.    Patient wishes to proceed with surgery in the second eye. Risks, benefits, alternatives reviewed. IOL selection reviewed.     Left eye  IOL: diboo 19.5    MIld FFKCN OS>OD    The patient expresses a desire to reduce spectacle dependence. I reviewed various IOL and LASER refractive surgical options and we will attempt to minimize spectacle dependence by managing astigmatism and optimizing IOL selection. Femtosecond LASER assisted cataract surgery (FLACS) technology was explained to the patient with educational videos and discussion.  The patient voices understanding and wishes to implement this technology during the cataract procedure.  I explained the increased precision of the LASER versus manual techniques, especially as it relates to astigmatism reduction with arcuate incisions.  I emphasized that although our goal is to reduce the need for refractive correction after surgery, there may still be a need for spectacle correction to achieve optimal visual acuity, and that a reasonable range of functional vision should be the expectation.  No guarantees are made about post operative refraction or visual acuity, as the eye may heal in unpredictable ways, and the standard risks, benefits, and alternatives to cataract surgery were explained.  The patient understands that the refractive portions of this cataract procedure are not covered by insurance, and  that there is an out of pocket expense of $1250 per eye. I also explained that even though our pre-operative plan is to utilize advanced refractive technologies during surgery, that I may decide to eliminate part or all of this plan if surgical challenges or complications arise, or I feel that it is not in the patient's best interest. Consent forms and an ABN form were given to the patient to review.  Catalys Parameters:    Left Eye:   CHARLOTTE:  12mm   ?Need to armando patient sitting up?: n  Capsulotomy: Scanned Capsule  stGstrstastdstest:st st1st Arcuate:  OFF  Incisions:  OFF

## 2022-05-24 NOTE — PROGRESS NOTES
HPI     72 y/o female presents to clinic for 1 week post op OD     5/16/22 diboo 18.5 OD    Pt states [facility] is good. Afraid she will run out of drops because she misses   sometimes     Last edited by Carrie Thomas on 5/24/2022  1:50 PM. (History)            Assessment /Plan     For exam results, see Encounter Report.    Post-operative state    Nuclear sclerosis of right eye      Slit lamp exam:  L/L: nl  K: clear, wound sealed  AC: trace cell  Iris/Lens: IOL centered and stable    POW1 s/p phaco: Surgery healing well with no signs of infection or abnormal inflammation.    Patient wishes to proceed with surgery in the second eye. Risks, benefits, alternatives reviewed. IOL selection reviewed.     Left eye  IOL: diboo 19.5    MIld FFKCN OS>OD    The patient expresses a desire to reduce spectacle dependence. I reviewed various IOL and LASER refractive surgical options and we will attempt to minimize spectacle dependence by managing astigmatism and optimizing IOL selection. Femtosecond LASER assisted cataract surgery (FLACS) technology was explained to the patient with educational videos and discussion.  The patient voices understanding and wishes to implement this technology during the cataract procedure.  I explained the increased precision of the LASER versus manual techniques, especially as it relates to astigmatism reduction with arcuate incisions.  I emphasized that although our goal is to reduce the need for refractive correction after surgery, there may still be a need for spectacle correction to achieve optimal visual acuity, and that a reasonable range of functional vision should be the expectation.  No guarantees are made about post operative refraction or visual acuity, as the eye may heal in unpredictable ways, and the standard risks, benefits, and alternatives to cataract surgery were explained.  The patient understands that the refractive portions of this cataract procedure are not covered by insurance, and  that there is an out of pocket expense of $1250 per eye. I also explained that even though our pre-operative plan is to utilize advanced refractive technologies during surgery, that I may decide to eliminate part or all of this plan if surgical challenges or complications arise, or I feel that it is not in the patient's best interest. Consent forms and an ABN form were given to the patient to review.  Catalys Parameters:    Left Eye:   CHARLOTTE:  12mm   ?Need to armando patient sitting up?: n  Capsulotomy: Scanned Capsule  rdGrdrrdarddrderd:rd rd3rd Arcuate:  OFF  Incisions:  OFF

## 2022-05-26 ENCOUNTER — PATIENT MESSAGE (OUTPATIENT)
Dept: PHARMACY | Facility: CLINIC | Age: 72
End: 2022-05-26
Payer: MEDICARE

## 2022-05-31 ENCOUNTER — TELEPHONE (OUTPATIENT)
Dept: ENDOSCOPY | Facility: HOSPITAL | Age: 72
End: 2022-05-31
Payer: MEDICARE

## 2022-05-31 DIAGNOSIS — K86.2 PANCREATIC CYST: Primary | ICD-10-CM

## 2022-05-31 NOTE — TELEPHONE ENCOUNTER
Patient called to reschedule EUS with Dr. Aguilera. Rescheduled to 6/2/22 at 11:15 am [facility]. New instructions sent via portal.

## 2022-06-01 ENCOUNTER — TELEPHONE (OUTPATIENT)
Dept: ELECTROPHYSIOLOGY | Facility: CLINIC | Age: 72
End: 2022-06-01
Payer: MEDICARE

## 2022-06-01 NOTE — TELEPHONE ENCOUNTER
----- Message from Tonia Case sent at 6/1/2022  8:38 AM CDT -----  Regarding: appt  Pt testes positive for COVID last night and needs to r/s her appt and can be reached at [phone]    Thank you

## 2022-06-01 NOTE — TELEPHONE ENCOUNTER
Returned the pt's call on this am.  Pt stated she did a home COVID test last night and it revealed as Positive. Rescheduled in clinic PPM check with the pt to 6/9/22 @ 2:00 pm.  Pt was agreeable with this date and time. Understanding was verbalized.  Pt appreciated the call.

## 2022-06-02 ENCOUNTER — TELEPHONE (OUTPATIENT)
Dept: INTERNAL MEDICINE | Facility: CLINIC | Age: 72
End: 2022-06-02
Payer: MEDICARE

## 2022-06-02 NOTE — TELEPHONE ENCOUNTER
----- Message from Vannesa Fuentes sent at 6/2/2022 10:23 AM CDT -----  Contact: self [phone]  Pt stated covid+ fatigue, headaches,sore throat requesting an RX and a call with advice.    [id number] - [place], [place] - [address] [place] [address] AT [address] [address]  Phone: [phone] Fax: [phone]        Please call and advise

## 2022-06-03 ENCOUNTER — PATIENT MESSAGE (OUTPATIENT)
Dept: INTERNAL MEDICINE | Facility: CLINIC | Age: 72
End: 2022-06-03
Payer: MEDICARE

## 2022-06-10 ENCOUNTER — TELEPHONE (OUTPATIENT)
Dept: CARDIOLOGY | Facility: HOSPITAL | Age: 72
End: 2022-06-10
Payer: MEDICARE

## 2022-06-10 NOTE — TELEPHONE ENCOUNTER
Patient stated she still tested positive for Covid this morning.  I rescheduled her in clinic device appointment for next Friday.  ----- Message from Sherry Dowd MA sent at 6/10/2022 10:45 AM CDT -----  Regarding: reschedule appt  Pt called to say she is still testing positive for Covid 19, please call [phone]        Thanks!

## 2022-06-13 ENCOUNTER — TELEPHONE (OUTPATIENT)
Dept: ENDOSCOPY | Facility: HOSPITAL | Age: 72
End: 2022-06-13
Payer: MEDICARE

## 2022-06-13 NOTE — TELEPHONE ENCOUNTER
Telephoned patient to reschedule EUS with no answer. Left voicemail message with my direct contact number for patient to return call.

## 2022-06-14 ENCOUNTER — TELEPHONE (OUTPATIENT)
Dept: OPHTHALMOLOGY | Facility: CLINIC | Age: 72
End: 2022-06-14
Payer: MEDICARE

## 2022-06-15 ENCOUNTER — TELEPHONE (OUTPATIENT)
Dept: OPHTHALMOLOGY | Facility: CLINIC | Age: 72
End: 2022-06-15
Payer: MEDICARE

## 2022-06-15 NOTE — TELEPHONE ENCOUNTER
Patient given arrival time of 9:00 am on Monday June 20 . Nothing to eat or drink after 9 pm.  Water, Gatorade after 9 pm until leaves home.  Start drops into the operative eye today. [address]

## 2022-06-17 ENCOUNTER — CLINICAL SUPPORT (OUTPATIENT)
Dept: CARDIOLOGY | Facility: HOSPITAL | Age: 72
End: 2022-06-17
Attending: INTERNAL MEDICINE
Payer: MEDICARE

## 2022-06-17 DIAGNOSIS — Z95.0 CARDIAC PACEMAKER IN SITU: ICD-10-CM

## 2022-06-17 DIAGNOSIS — I49.5 SSS (SICK SINUS SYNDROME): ICD-10-CM

## 2022-06-17 PROCEDURE — 93280 CARDIAC DEVICE CHECK - IN CLINIC & HOSPITAL: ICD-10-PCS | Mod: 26,,, | Performed by: INTERNAL MEDICINE

## 2022-06-17 PROCEDURE — 93280 PM DEVICE PROGR EVAL DUAL: CPT

## 2022-06-17 PROCEDURE — 93280 PM DEVICE PROGR EVAL DUAL: CPT | Mod: 26,,, | Performed by: INTERNAL MEDICINE

## 2022-06-20 ENCOUNTER — HOSPITAL ENCOUNTER (OUTPATIENT)
Facility: OTHER | Age: 72
Discharge: HOME | End: 2022-06-20
Attending: OPHTHALMOLOGY | Admitting: OPHTHALMOLOGY
Payer: MEDICARE

## 2022-06-20 ENCOUNTER — ANESTHESIA EVENT (OUTPATIENT)
Dept: SURGERY | Facility: OTHER | Age: 72
End: 2022-06-20
Payer: MEDICARE

## 2022-06-20 ENCOUNTER — ANESTHESIA (OUTPATIENT)
Dept: SURGERY | Facility: OTHER | Age: 72
End: 2022-06-20
Payer: MEDICARE

## 2022-06-20 VITALS
RESPIRATION RATE: 18 BRPM | WEIGHT: 156 LBS | DIASTOLIC BLOOD PRESSURE: 70 MMHG | BODY MASS INDEX: 23.64 KG/M2 | OXYGEN SATURATION: 100 % | TEMPERATURE: 98 F | HEART RATE: 60 BPM | SYSTOLIC BLOOD PRESSURE: 126 MMHG | HEIGHT: 68 IN

## 2022-06-20 DIAGNOSIS — H25.12 NUCLEAR SCLEROSIS OF LEFT EYE: ICD-10-CM

## 2022-06-20 DIAGNOSIS — Z98.890 POST-OPERATIVE STATE: ICD-10-CM

## 2022-06-20 DIAGNOSIS — H25.12 NUCLEAR SCLEROTIC CATARACT OF LEFT EYE: Primary | ICD-10-CM

## 2022-06-20 PROCEDURE — 63600175 PHARM REV CODE 636 W HCPCS: Performed by: NURSE ANESTHETIST, CERTIFIED REGISTERED

## 2022-06-20 PROCEDURE — V2632 POST CHMBR INTRAOCULAR LENS: HCPCS | Performed by: OPHTHALMOLOGY

## 2022-06-20 PROCEDURE — 66984 PR REMOVAL, CATARACT, W/INSRT INTRAOC LENS, W/O ENDO CYCLO: ICD-10-PCS | Mod: 79,LT,, | Performed by: OPHTHALMOLOGY

## 2022-06-20 PROCEDURE — 66984 XCAPSL CTRC RMVL W/O ECP: CPT | Mod: 79,LT,, | Performed by: OPHTHALMOLOGY

## 2022-06-20 PROCEDURE — 36000706: Performed by: OPHTHALMOLOGY

## 2022-06-20 PROCEDURE — 66999 UNLISTED PX ANT SEGMENT EYE: CPT | Mod: CSM,LT,, | Performed by: OPHTHALMOLOGY

## 2022-06-20 PROCEDURE — 71000015 HC POSTOP RECOV 1ST HR: Performed by: OPHTHALMOLOGY

## 2022-06-20 PROCEDURE — 37000008 HC ANESTHESIA 1ST 15 MINUTES: Performed by: OPHTHALMOLOGY

## 2022-06-20 PROCEDURE — 66999 PR FEMTO LRI: ICD-10-PCS | Mod: CSM,LT,, | Performed by: OPHTHALMOLOGY

## 2022-06-20 PROCEDURE — 36000707: Performed by: OPHTHALMOLOGY

## 2022-06-20 PROCEDURE — 37000009 HC ANESTHESIA EA ADD 15 MINS: Performed by: OPHTHALMOLOGY

## 2022-06-20 PROCEDURE — 25000003 PHARM REV CODE 250: Performed by: OPHTHALMOLOGY

## 2022-06-20 DEVICE — LENS EYHANCE +19.5D: Type: IMPLANTABLE DEVICE | Site: EYE | Status: FUNCTIONAL

## 2022-06-20 RX ORDER — MOXIFLOXACIN 5 MG/ML
SOLUTION/ DROPS OPHTHALMIC
Status: DISCONTINUED | OUTPATIENT
Start: 2022-06-20 | End: 2022-06-20 | Stop reason: HOSPADM

## 2022-06-20 RX ORDER — TETRACAINE HYDROCHLORIDE 5 MG/ML
SOLUTION OPHTHALMIC
Status: DISCONTINUED | OUTPATIENT
Start: 2022-06-20 | End: 2022-06-20 | Stop reason: HOSPADM

## 2022-06-20 RX ORDER — FENTANYL CITRATE 50 UG/ML
INJECTION, SOLUTION INTRAMUSCULAR; INTRAVENOUS
Status: DISCONTINUED | OUTPATIENT
Start: 2022-06-20 | End: 2022-06-20

## 2022-06-20 RX ORDER — PHENYLEPHRINE HYDROCHLORIDE 25 MG/ML
1 SOLUTION/ DROPS OPHTHALMIC
Status: COMPLETED | OUTPATIENT
Start: 2022-06-20 | End: 2022-06-20

## 2022-06-20 RX ORDER — TROPICAMIDE 10 MG/ML
1 SOLUTION/ DROPS OPHTHALMIC
Status: COMPLETED | OUTPATIENT
Start: 2022-06-20 | End: 2022-06-20

## 2022-06-20 RX ORDER — PHENYLEPHRINE HYDROCHLORIDE 100 MG/ML
1 SOLUTION/ DROPS OPHTHALMIC
Status: DISCONTINUED | OUTPATIENT
Start: 2022-06-20 | End: 2022-06-20 | Stop reason: HOSPADM

## 2022-06-20 RX ORDER — ACETAMINOPHEN 325 MG/1
650 TABLET ORAL EVERY 4 HOURS PRN
Status: DISCONTINUED | OUTPATIENT
Start: 2022-06-20 | End: 2022-06-20 | Stop reason: HOSPADM

## 2022-06-20 RX ORDER — PROPARACAINE HYDROCHLORIDE 5 MG/ML
1 SOLUTION/ DROPS OPHTHALMIC
Status: DISCONTINUED | OUTPATIENT
Start: 2022-06-20 | End: 2022-06-20 | Stop reason: HOSPADM

## 2022-06-20 RX ORDER — TETRACAINE HYDROCHLORIDE 5 MG/ML
1 SOLUTION OPHTHALMIC
Status: COMPLETED | OUTPATIENT
Start: 2022-06-20 | End: 2022-06-20

## 2022-06-20 RX ORDER — LIDOCAINE HYDROCHLORIDE 40 MG/ML
INJECTION, SOLUTION RETROBULBAR
Status: DISCONTINUED | OUTPATIENT
Start: 2022-06-20 | End: 2022-06-20 | Stop reason: HOSPADM

## 2022-06-20 RX ORDER — MOXIFLOXACIN 5 MG/ML
1 SOLUTION/ DROPS OPHTHALMIC
Status: COMPLETED | OUTPATIENT
Start: 2022-06-20 | End: 2022-06-20

## 2022-06-20 RX ADMIN — TROPICAMIDE 1 DROP: 10 SOLUTION/ DROPS OPHTHALMIC at 09:06

## 2022-06-20 RX ADMIN — TETRACAINE HYDROCHLORIDE 1 DROP: 5 SOLUTION OPHTHALMIC at 09:06

## 2022-06-20 RX ADMIN — MOXIFLOXACIN 1 DROP: 5 SOLUTION/ DROPS OPHTHALMIC at 09:06

## 2022-06-20 RX ADMIN — PHENYLEPHRINE HYDROCHLORIDE 1 DROP: 25 SOLUTION/ DROPS OPHTHALMIC at 09:06

## 2022-06-20 RX ADMIN — MOXIFLOXACIN 1 DROP: 5 SOLUTION/ DROPS OPHTHALMIC at 11:06

## 2022-06-20 RX ADMIN — FENTANYL CITRATE 100 MCG: 50 INJECTION, SOLUTION INTRAMUSCULAR; INTRAVENOUS at 11:06

## 2022-06-20 NOTE — ANESTHESIA PREPROCEDURE EVALUATION
06/20/2022  Manisha Wynne is a 71 y.o., female.    Pre-op Assessment    I have reviewed the Patient Summary Reports.    I have reviewed the Nursing Notes. I have reviewed the NPO Status.   I have reviewed the Medications.     Review of Systems  Anesthesia Hx:  No problems with previous Anesthesia  History of prior surgery of interest to airway management or planning: Previous anesthesia: MAC  5/16/22 phaco, rec'd versed 2 mg, did well with MAC.  Denies Family Hx of Anesthesia complications.   Denies Personal Hx of Anesthesia complications.   Social:  Former Smoker, Social Alcohol Use    Hematology/Oncology:     Oncology Normal    -- Anemia:   EENT/Dental:EENT/Dental Normal   Cardiovascular:   Pacemaker Hypertension, well controlled Dysrhythmias atrial fibrillation hyperlipidemia SSS. 1st degree AV block.   Pulmonary:   Sleep Apnea Recent covid, sx's resolved   Renal/:   Chronic Renal Disease, CRI    Hepatic/GI:  Hepatic/GI Normal    Musculoskeletal:  Spine Disorders: lumbar Degenerative disease    Neurological:   Neuromuscular Disease,   Peripheral Neuropathy    Endocrine:   Hyperparathyroidism.   Psych:   Psychiatric History depression          Physical Exam  General:  Well nourished      Airway/Jaw/Neck:  Airway Findings: Mouth Opening: Normal   Tongue: Normal   General Airway Assessment: Adult, Average Mallampati: II  TM Distance: Normal, at least 6 cm   Jaw/Neck Findings:  Neck ROM: Normal ROM       Dental:  Dental Findings: In tact, Molar caps          Mental Status:  Mental Status Findings:  Cooperative, Alert and Oriented         Anesthesia Plan  Type of Anesthesia, risks & benefits discussed:  Anesthesia Type:  MAC    Patient's Preference:   Plan Factors:          Intra-op Monitoring Plan: standard ASA monitors  Intra-op Monitoring Plan Comments:   Post Op Pain Control Plan: per primary  service following discharge from PACU and multimodal analgesia  Post Op Pain Control Plan Comments:     Induction:    Beta Blocker:         Informed Consent: Informed consent signed with the Patient and all parties understand the risks and agree with anesthesia plan.  All questions answered.  Anesthesia consent signed with patient.  ASA Score: 3     Day of Surgery Review of History & Physical:        Anesthesia Plan Notes: Interim hx Covid 19, sx's completely resolved, 14 days since paxil finished        Ready For Surgery From Anesthesia Perspective.           Physical Exam  General: Well nourished    Airway:  Mallampati: II   Mouth Opening: Normal  TM Distance: Normal, at least 6 cm  Tongue: Normal  Neck ROM: Normal ROM    Dental:  In tact, Molar caps          Anesthesia Plan  Type of Anesthesia, risks & benefits discussed:    Anesthesia Type: MAC  Intra-op Monitoring Plan: standard ASA monitors  Post Op Pain Control Plan: per primary service following discharge from PACU and multimodal analgesia  Informed Consent: Informed consent signed with the Patient and all parties understand the risks and agree with anesthesia plan.  All questions answered.   ASA Score: 3  Anesthesia Plan Notes: Interim hx Covid 19, sx's completely resolved, 14 days since paxil finished    Ready For Surgery From Anesthesia Perspective.       .

## 2022-06-20 NOTE — OP NOTE
SURGEON:  Ines Aguilera M.D.    PREOPERATIVE DIAGNOSIS:    Nuclear Sclerotic Cataract Left Eye    POSTOPERATIVE DIAGNOSIS:    Nuclear Sclerotic Cataract Left  Eye    PROCEDURES:    Phacoemulsification with  intraocular lens, Left eye (03909)  With ORA LASER assist    DATE OF SURGERY: 06/20/2022    IMPLANT: diboo 19.5    ANESTHESIA:  MAC with topical Lidocaine    COMPLICATIONS:  None    ESTIMATED BLOOD LOSS: None    SPECIMENS: None    INDICATIONS:    The patient has a history of painless progressive visual loss and difficulty with activities of daily living, which specifically include difficult driving at night due to glare and difficulty reading small print, secondary to cataract formation.  After a thorough discussion of the risks, benefits, and alternatives to cataract surgery, including, but not limited to, the rare risks of infection, retinal detachment, hemorrhage, need for additional surgery, loss of vision, and even loss of the eye, the patient voices understanding and desires to proceed.    DESCRIPTION OF PROCEDURE:      The patients IOL calculations were reviewed, and the lens selection confirmed.   After verification and marking of the proper eye in the preop holding area, the patient was brought to the operating room in supine position where the eye was prepped and draped in standard sterile fashion with 5% Betadine and a lid speculum placed in the eye.   Topical 4% Lidocaine was used in addition to the preoperative anesthesia and the procedure was begun by the creation of a paracentesis incision through which viscoelastic was used to fill the anterior chamber.  Next, a keratome blade was used to create a triplanar temporal clear corneal incision and a cystotome and Utrata forceps used to fashion a continuous curvilinear capsulorrhexis.  Hydrodissection was carried out using the Lorenzo hydrodissection cannula and the nucleus was found to be mobile.  Phacoemulsification of the nucleus was carried out using a  quick chop technique, and all remaining epinuclear and cortical material was removed.  The eye was then reformed with Viscoelastic and ORA was used to verify the proper IOL power. The intraocular lens was then implanted into the capsular bag.  All remaining viscoelastics were removed from the eye and at the end of the case the pupil was round, the lens was well-centered within the capsular bag and all wounds were found to be water tight.  Drops of Vigamox and Pred Forte were instilled and a shield was placed over the eye. The patient will follow up with Dr. Aguilera in the morning.

## 2022-06-20 NOTE — ANESTHESIA POSTPROCEDURE EVALUATION
Anesthesia Post Evaluation    Patient: Manisha Wynne    Procedure(s) Performed: Procedure(s) (LRB):  EXTRACTION, CATARACT, WITH IOL INSERTION (Left)    Final Anesthesia Type: MAC      Patient location during evaluation: [facility]  Patient participation: Yes- Able to Participate  Level of consciousness: awake and alert  Post-procedure vital signs: reviewed and stable  Pain management: adequate  Airway patency: patent    PONV status at discharge: No PONV  Anesthetic complications: no      Cardiovascular status: blood pressure returned to baseline  Respiratory status: unassisted and spontaneous ventilation  Hydration status: euvolemic  Follow-up not needed.          Vitals Value Taken Time   /59 06/20/22 0917   Temp 36.8 °C (98.3 °F) 06/20/22 0917   Pulse 61 06/20/22 0917   Resp 18 06/20/22 0917   SpO2 100 % 06/20/22 0917         No case tracking events are documented in the log.      Pain/Corie Score: No data recorded

## 2022-06-21 ENCOUNTER — OFFICE VISIT (OUTPATIENT)
Dept: OPHTHALMOLOGY | Facility: CLINIC | Age: 72
End: 2022-06-21
Attending: OPHTHALMOLOGY
Payer: MEDICARE

## 2022-06-21 DIAGNOSIS — Z98.890 POST-OPERATIVE STATE: Primary | ICD-10-CM

## 2022-06-21 DIAGNOSIS — H25.12 NUCLEAR SCLEROTIC CATARACT OF LEFT EYE: ICD-10-CM

## 2022-06-21 PROCEDURE — 99024 POSTOP FOLLOW-UP VISIT: CPT | Mod: S$GLB,,, | Performed by: OPHTHALMOLOGY

## 2022-06-21 PROCEDURE — 1157F PR ADVANCE CARE PLAN OR EQUIV PRESENT IN MEDICAL RECORD: ICD-10-PCS | Mod: CPTII,S$GLB,, | Performed by: OPHTHALMOLOGY

## 2022-06-21 PROCEDURE — 1126F PR PAIN SEVERITY QUANTIFIED, NO PAIN PRESENT: ICD-10-PCS | Mod: CPTII,S$GLB,, | Performed by: OPHTHALMOLOGY

## 2022-06-21 PROCEDURE — 1157F ADVNC CARE PLAN IN RCRD: CPT | Mod: CPTII,S$GLB,, | Performed by: OPHTHALMOLOGY

## 2022-06-21 PROCEDURE — 99999 PR PBB SHADOW E&M-EST. PATIENT-LVL III: CPT | Mod: PBBFAC,,, | Performed by: OPHTHALMOLOGY

## 2022-06-21 PROCEDURE — 1126F AMNT PAIN NOTED NONE PRSNT: CPT | Mod: CPTII,S$GLB,, | Performed by: OPHTHALMOLOGY

## 2022-06-21 PROCEDURE — 3288F PR FALLS RISK ASSESSMENT DOCUMENTED: ICD-10-PCS | Mod: CPTII,S$GLB,, | Performed by: OPHTHALMOLOGY

## 2022-06-21 PROCEDURE — 4010F ACE/ARB THERAPY RXD/TAKEN: CPT | Mod: CPTII,S$GLB,, | Performed by: OPHTHALMOLOGY

## 2022-06-21 PROCEDURE — 1160F RVW MEDS BY RX/DR IN RCRD: CPT | Mod: CPTII,S$GLB,, | Performed by: OPHTHALMOLOGY

## 2022-06-21 PROCEDURE — 1159F MED LIST DOCD IN RCRD: CPT | Mod: CPTII,S$GLB,, | Performed by: OPHTHALMOLOGY

## 2022-06-21 PROCEDURE — 3288F FALL RISK ASSESSMENT DOCD: CPT | Mod: CPTII,S$GLB,, | Performed by: OPHTHALMOLOGY

## 2022-06-21 PROCEDURE — 99999 PR PBB SHADOW E&M-EST. PATIENT-LVL III: ICD-10-PCS | Mod: PBBFAC,,, | Performed by: OPHTHALMOLOGY

## 2022-06-21 PROCEDURE — 1101F PT FALLS ASSESS-DOCD LE1/YR: CPT | Mod: CPTII,S$GLB,, | Performed by: OPHTHALMOLOGY

## 2022-06-21 PROCEDURE — 1101F PR PT FALLS ASSESS DOC 0-1 FALLS W/OUT INJ PAST YR: ICD-10-PCS | Mod: CPTII,S$GLB,, | Performed by: OPHTHALMOLOGY

## 2022-06-21 PROCEDURE — 99024 PR POST-OP FOLLOW-UP VISIT: ICD-10-PCS | Mod: S$GLB,,, | Performed by: OPHTHALMOLOGY

## 2022-06-21 PROCEDURE — 1160F PR REVIEW ALL MEDS BY PRESCRIBER/CLIN PHARMACIST DOCUMENTED: ICD-10-PCS | Mod: CPTII,S$GLB,, | Performed by: OPHTHALMOLOGY

## 2022-06-21 PROCEDURE — 4010F PR ACE/ARB THEARPY RXD/TAKEN: ICD-10-PCS | Mod: CPTII,S$GLB,, | Performed by: OPHTHALMOLOGY

## 2022-06-21 PROCEDURE — 1159F PR MEDICATION LIST DOCUMENTED IN MEDICAL RECORD: ICD-10-PCS | Mod: CPTII,S$GLB,, | Performed by: OPHTHALMOLOGY

## 2022-06-21 NOTE — PROGRESS NOTES
HPI     5/16/22 diboo 18.5 OD  6/20/22 diboo 19.5 OS    POD 1 Phaco w/IOL OS June 20, 2022  +Fuchs    MEDS:    Prednisolone/Gatiflox/Bromfenac TID OS    Patient states she is doing well but left eye is throbbing.          Last edited by Kimberly Mcneil on 6/21/2022  8:52 AM. (History)            Assessment /Plan     For exam results, see Encounter Report.    Post-operative state    Nuclear sclerotic cataract of left eye      Slit lamp exam:  L/L: nl  K: clear, wound sealed  AC: 1+ cell  Lens: IOL centered and stable    POD1 s/p Phaco/IOL  Appropriate precautions and post op medications reviewed.  Patient instructed to call or come in if symptoms of redness, decreased vision, or pain are experienced.

## 2022-06-23 ENCOUNTER — RESEARCH ENCOUNTER (OUTPATIENT)
Dept: RESEARCH | Facility: CLINIC | Age: 72
End: 2022-06-23
Payer: MEDICARE

## 2022-06-23 NOTE — PROGRESS NOTES
Study Name: A Phase 3 Open-Label Clinical Study to Evaluate the Safety and Tolerability of Rebiotix [id number] (microbiota suspension) in Subjects with Recurrent Clostridium difficile Infection; IRB#[id number]   Sponsor: Dawna  PI: Shena Garcia     Follow up month 4  Phone Assessment     Subject: [id number]     Study staff spoke to Ms. Wynne on the telephone for her 4-month follow-up from receiving study enema. Ms. Wynne told study staff that she has not had any diarrhea that meet C.Diff criteria.       Ms. Wynne completed the C. Diff study questionnaire. She reported since last visit had surgery to remove cataracts bilaterally and had COVID beginning of June. Will capture AE's and conmed changes. No GRABIEL's assessed.      All other required assessments per protocol completed during visit and documented on site specific case report form. Source for specific data points can be located in subjects binder.

## 2022-07-13 ENCOUNTER — TELEPHONE (OUTPATIENT)
Dept: CARDIOLOGY | Facility: HOSPITAL | Age: 72
End: 2022-07-13
Payer: MEDICARE

## 2022-07-13 NOTE — TELEPHONE ENCOUNTER
Called to moved patients device appointment from 07/15/2022 top 07/14/2022.  No answer, left a voice message for patient to return the call so we can move her appointment.

## 2022-07-14 ENCOUNTER — CLINICAL SUPPORT (OUTPATIENT)
Dept: CARDIOLOGY | Facility: HOSPITAL | Age: 72
End: 2022-07-14
Attending: INTERNAL MEDICINE
Payer: MEDICARE

## 2022-07-14 DIAGNOSIS — I49.5 SSS (SICK SINUS SYNDROME): ICD-10-CM

## 2022-07-14 DIAGNOSIS — Z95.0 CARDIAC PACEMAKER IN SITU: ICD-10-CM

## 2022-07-14 PROCEDURE — 93280 PM DEVICE PROGR EVAL DUAL: CPT

## 2022-07-14 PROCEDURE — 93280 PM DEVICE PROGR EVAL DUAL: CPT | Mod: 26,,, | Performed by: INTERNAL MEDICINE

## 2022-07-14 PROCEDURE — 93280 CARDIAC DEVICE CHECK - IN CLINIC & HOSPITAL: ICD-10-PCS | Mod: 26,,, | Performed by: INTERNAL MEDICINE

## 2022-07-18 ENCOUNTER — OFFICE VISIT (OUTPATIENT)
Dept: INTERNAL MEDICINE | Facility: CLINIC | Age: 72
End: 2022-07-18
Payer: MEDICARE

## 2022-07-18 VITALS
SYSTOLIC BLOOD PRESSURE: 110 MMHG | BODY MASS INDEX: 23.43 KG/M2 | HEART RATE: 76 BPM | OXYGEN SATURATION: 99 % | DIASTOLIC BLOOD PRESSURE: 72 MMHG | HEIGHT: 68 IN | WEIGHT: 154.56 LBS

## 2022-07-18 DIAGNOSIS — G95.9 CERVICAL MYELOPATHY: ICD-10-CM

## 2022-07-18 DIAGNOSIS — Z13.89 SCREENING FOR BLOOD OR PROTEIN IN URINE: ICD-10-CM

## 2022-07-18 DIAGNOSIS — M81.8 OTHER OSTEOPOROSIS WITHOUT CURRENT PATHOLOGICAL FRACTURE: ICD-10-CM

## 2022-07-18 DIAGNOSIS — D64.9 ANEMIA, UNSPECIFIED TYPE: Primary | ICD-10-CM

## 2022-07-18 DIAGNOSIS — E78.5 HYPERLIPIDEMIA, UNSPECIFIED HYPERLIPIDEMIA TYPE: ICD-10-CM

## 2022-07-18 DIAGNOSIS — Z12.31 SCREENING MAMMOGRAM, ENCOUNTER FOR: ICD-10-CM

## 2022-07-18 DIAGNOSIS — Z95.0 CARDIAC PACEMAKER IN SITU: ICD-10-CM

## 2022-07-18 DIAGNOSIS — R73.9 HYPERGLYCEMIA: ICD-10-CM

## 2022-07-18 DIAGNOSIS — R13.10 DYSPHAGIA, UNSPECIFIED TYPE: ICD-10-CM

## 2022-07-18 LAB
BACTERIA #/AREA URNS AUTO: ABNORMAL /HPF
BILIRUB UR QL STRIP: NEGATIVE
CAOX CRY UR QL COMP ASSIST: ABNORMAL
CLARITY UR REFRACT.AUTO: ABNORMAL
COLOR UR AUTO: YELLOW
GLUCOSE UR QL STRIP: NEGATIVE
HGB UR QL STRIP: NEGATIVE
HYALINE CASTS UR QL AUTO: 6 /LPF
KETONES UR QL STRIP: NEGATIVE
LEUKOCYTE ESTERASE UR QL STRIP: NEGATIVE
MICROSCOPIC COMMENT: ABNORMAL
NITRITE UR QL STRIP: NEGATIVE
PH UR STRIP: 5 [PH] (ref 5–8)
PROT UR QL STRIP: NEGATIVE
RBC #/AREA URNS AUTO: 3 /HPF (ref 0–4)
SP GR UR STRIP: 1.02 (ref 1–1.03)
SQUAMOUS #/AREA URNS AUTO: 0 /HPF
URN SPEC COLLECT METH UR: ABNORMAL
WBC #/AREA URNS AUTO: 9 /HPF (ref 0–5)

## 2022-07-18 PROCEDURE — 1126F PR PAIN SEVERITY QUANTIFIED, NO PAIN PRESENT: ICD-10-PCS | Mod: CPTII,S$GLB,, | Performed by: INTERNAL MEDICINE

## 2022-07-18 PROCEDURE — 4010F ACE/ARB THERAPY RXD/TAKEN: CPT | Mod: CPTII,S$GLB,, | Performed by: INTERNAL MEDICINE

## 2022-07-18 PROCEDURE — 3074F SYST BP LT 130 MM HG: CPT | Mod: CPTII,S$GLB,, | Performed by: INTERNAL MEDICINE

## 2022-07-18 PROCEDURE — 3078F DIAST BP <80 MM HG: CPT | Mod: CPTII,S$GLB,, | Performed by: INTERNAL MEDICINE

## 2022-07-18 PROCEDURE — 3288F FALL RISK ASSESSMENT DOCD: CPT | Mod: CPTII,S$GLB,, | Performed by: INTERNAL MEDICINE

## 2022-07-18 PROCEDURE — 1159F MED LIST DOCD IN RCRD: CPT | Mod: CPTII,S$GLB,, | Performed by: INTERNAL MEDICINE

## 2022-07-18 PROCEDURE — 99214 OFFICE O/P EST MOD 30 MIN: CPT | Mod: S$GLB,,, | Performed by: INTERNAL MEDICINE

## 2022-07-18 PROCEDURE — 99214 PR OFFICE/OUTPT VISIT, EST, LEVL IV, 30-39 MIN: ICD-10-PCS | Mod: S$GLB,,, | Performed by: INTERNAL MEDICINE

## 2022-07-18 PROCEDURE — 1101F PR PT FALLS ASSESS DOC 0-1 FALLS W/OUT INJ PAST YR: ICD-10-PCS | Mod: CPTII,S$GLB,, | Performed by: INTERNAL MEDICINE

## 2022-07-18 PROCEDURE — 3008F BODY MASS INDEX DOCD: CPT | Mod: CPTII,S$GLB,, | Performed by: INTERNAL MEDICINE

## 2022-07-18 PROCEDURE — 99999 PR PBB SHADOW E&M-EST. PATIENT-LVL V: CPT | Mod: PBBFAC,,, | Performed by: INTERNAL MEDICINE

## 2022-07-18 PROCEDURE — 1157F ADVNC CARE PLAN IN RCRD: CPT | Mod: CPTII,S$GLB,, | Performed by: INTERNAL MEDICINE

## 2022-07-18 PROCEDURE — 3008F PR BODY MASS INDEX (BMI) DOCUMENTED: ICD-10-PCS | Mod: CPTII,S$GLB,, | Performed by: INTERNAL MEDICINE

## 2022-07-18 PROCEDURE — 3074F PR MOST RECENT SYSTOLIC BLOOD PRESSURE < 130 MM HG: ICD-10-PCS | Mod: CPTII,S$GLB,, | Performed by: INTERNAL MEDICINE

## 2022-07-18 PROCEDURE — 3288F PR FALLS RISK ASSESSMENT DOCUMENTED: ICD-10-PCS | Mod: CPTII,S$GLB,, | Performed by: INTERNAL MEDICINE

## 2022-07-18 PROCEDURE — 4010F PR ACE/ARB THEARPY RXD/TAKEN: ICD-10-PCS | Mod: CPTII,S$GLB,, | Performed by: INTERNAL MEDICINE

## 2022-07-18 PROCEDURE — 1159F PR MEDICATION LIST DOCUMENTED IN MEDICAL RECORD: ICD-10-PCS | Mod: CPTII,S$GLB,, | Performed by: INTERNAL MEDICINE

## 2022-07-18 PROCEDURE — 99999 PR PBB SHADOW E&M-EST. PATIENT-LVL V: ICD-10-PCS | Mod: PBBFAC,,, | Performed by: INTERNAL MEDICINE

## 2022-07-18 PROCEDURE — 1101F PT FALLS ASSESS-DOCD LE1/YR: CPT | Mod: CPTII,S$GLB,, | Performed by: INTERNAL MEDICINE

## 2022-07-18 PROCEDURE — 81001 URINALYSIS AUTO W/SCOPE: CPT | Performed by: INTERNAL MEDICINE

## 2022-07-18 PROCEDURE — 1157F PR ADVANCE CARE PLAN OR EQUIV PRESENT IN MEDICAL RECORD: ICD-10-PCS | Mod: CPTII,S$GLB,, | Performed by: INTERNAL MEDICINE

## 2022-07-18 PROCEDURE — 1126F AMNT PAIN NOTED NONE PRSNT: CPT | Mod: CPTII,S$GLB,, | Performed by: INTERNAL MEDICINE

## 2022-07-18 PROCEDURE — 3078F PR MOST RECENT DIASTOLIC BLOOD PRESSURE < 80 MM HG: ICD-10-PCS | Mod: CPTII,S$GLB,, | Performed by: INTERNAL MEDICINE

## 2022-07-18 NOTE — PROGRESS NOTES
Subjective:       Patient ID: Manisha Wynne is a 71 y.o. female.    Chief Complaint: Follow-up    HPIPt is doing better - she has recovered from C diff.  No CP or SOB. Her pacer is at the end of its lifespan.  She plans surgery for her cervical spine soon.  No falls or fractures in the last year.  Some irritable bowel symptoms but GI is overall good except for some difficulty swallowing solids and liquids at times.    Review of Systems   Constitutional: Positive for activity change. Negative for unexpected weight change.   HENT: Positive for hearing loss and trouble swallowing. Negative for rhinorrhea.    Eyes: Positive for visual disturbance. Negative for discharge.   Respiratory: Negative for chest tightness and wheezing.    Cardiovascular: Negative for chest pain and palpitations.   Gastrointestinal: Positive for diarrhea. Negative for blood in stool, constipation and vomiting.   Endocrine: Positive for polyuria. Negative for polydipsia.   Genitourinary: Negative for difficulty urinating, dysuria, hematuria and menstrual problem.   Musculoskeletal: Negative for arthralgias, joint swelling and neck pain.   Neurological: Positive for weakness. Negative for headaches.   Psychiatric/Behavioral: Positive for dysphoric mood. Negative for confusion.       Objective:      Physical Exam  Constitutional:       General: She is not in acute distress.     Appearance: She is well-developed.   HENT:      Head: Normocephalic.   Neck:      Thyroid: No thyromegaly.      Vascular: No JVD.   Cardiovascular:      Rate and Rhythm: Normal rate and regular rhythm.      Heart sounds: Normal heart sounds. No murmur heard.    No friction rub. No gallop.   Pulmonary:      Effort: Pulmonary effort is normal.      Breath sounds: Normal breath sounds. No wheezing or rales.   Abdominal:      General: Bowel sounds are normal. There is no distension.      Palpations: Abdomen is soft. There is no mass.      Tenderness: There is no  abdominal tenderness. There is no guarding or rebound.   Musculoskeletal:      Cervical back: Neck supple.   Lymphadenopathy:      Cervical: No cervical adenopathy.   Skin:     General: Skin is warm and dry.   Neurological:      Mental Status: She is alert and oriented to person, place, and time.      Deep Tendon Reflexes: Reflexes are normal and symmetric.   Psychiatric:         Behavior: Behavior normal.         Thought Content: Thought content normal.         Judgment: Judgment normal.         Assessment:       1. Anemia, unspecified type    2. Hyperlipidemia, unspecified hyperlipidemia type    3. Hyperglycemia    4. Other osteoporosis without current pathological fracture    5. Cardiac pacemaker in situ    6. Screening for blood or protein in urine    7. Cervical myelopathy    8. Screening mammogram, encounter for    9. Dysphagia, unspecified type        Plan:   Anemia, unspecified type  -     CBC Auto Differential; Future; Expected date: 07/18/2022  -     Iron and TIBC; Future; Expected date: 07/18/2022  -     Ferritin; Future; Expected date: 07/18/2022    Hyperlipidemia, unspecified hyperlipidemia type  -     Lipid Panel; Future; Expected date: 07/18/2022    Hyperglycemia  -     Hemoglobin A1C; Future; Expected date: 07/18/2022    Other osteoporosis without current pathological fracture  -     Vitamin D; Future; Expected date: 07/18/2022    Cardiac pacemaker in situ  -     Ambulatory referral/consult to Electrophysiology; Future; Expected date: 07/25/2022    Screening for blood or protein in urine  -     Urinalysis    Cervical myelopathy  -     Ambulatory referral/consult to Neurosurgery; Future; Expected date: 07/25/2022  -     X-Ray Chest PA And Lateral; Future; Expected date: 07/18/2022    Screening mammogram, encounter for  -     Mammo Digital Screening Bilat w/ Dre; Future; Expected date: 01/18/2023    Dysphagia, unspecified type  -     Case Request Endoscopy: ESOPHAGOGASTRODUODENOSCOPY  (EGD)    Endoscopy and pacemaker evaluation prior to surgery - will add lab to the lab she is doing in 1-2 weeks.     See Dr. Cheek back - CXR as she is 6 weeks out from COVID.    Answers for HPI/ROS submitted by the patient on 7/17/2022  activity change: Yes  unexpected weight change: No  neck pain: No  hearing loss: Yes  rhinorrhea: No  trouble swallowing: Yes  eye discharge: No  visual disturbance: Yes  chest tightness: No  wheezing: No  chest pain: No  palpitations: No  blood in stool: No  constipation: No  vomiting: No  diarrhea: Yes  polydipsia: No  polyuria: Yes  difficulty urinating: No  hematuria: No  menstrual problem: No  dysuria: No  joint swelling: No  arthralgias: No  headaches: No  weakness: Yes  confusion: No  dysphoric mood: Yes

## 2022-07-18 NOTE — PROGRESS NOTES
Please see other note from this same date.      Answers for HPI/ROS submitted by the patient on 7/17/2022  activity change: Yes  unexpected weight change: No  neck pain: No  hearing loss: Yes  rhinorrhea: No  trouble swallowing: Yes  eye discharge: No  visual disturbance: Yes  chest tightness: No  wheezing: No  chest pain: No  palpitations: No  blood in stool: No  constipation: No  vomiting: No  diarrhea: Yes  polydipsia: No  polyuria: Yes  difficulty urinating: No  hematuria: No  menstrual problem: No  dysuria: No  joint swelling: No  arthralgias: No  headaches: No  weakness: Yes  confusion: No  dysphoric mood: Yes

## 2022-07-19 ENCOUNTER — TELEPHONE (OUTPATIENT)
Dept: ELECTROPHYSIOLOGY | Facility: CLINIC | Age: 72
End: 2022-07-19
Payer: MEDICARE

## 2022-07-19 NOTE — TELEPHONE ENCOUNTER
I spoke with Ms. Wynne who said that she needs to have spinal surgery and she is worried about her device hitting RRT and the timing of scheduling her surgery. I let her know that I would send a message to the device to team to check on her remaining battery life and that we would then go from there in regards to making her an appointment in the clinic. I let her know that we would be in contact with her once we heard from the device team. She verbalized understanding and appreciated the call.

## 2022-07-19 NOTE — TELEPHONE ENCOUNTER
----- Message from Roger Medeiros MA sent at 7/19/2022  3:59 PM CDT -----  Regarding: FW: appointment  Patient would like to speak the nurse prior to making there appt.   ----- Message -----  From: Guera Zarate RN  Sent: 7/19/2022  12:30 PM CDT  To: Roger Medeiros MA  Subject: appointment                                      Patient is due for follow up with either Dr. Sheets or Svetlana Miner. She has a recall in. Patient saw Dr. Sanchez and she placed a referral back to EP. Can you please get the patient scheduled with either provider?  Thanks,  Guera

## 2022-07-19 NOTE — TELEPHONE ENCOUNTER
Spoke to pt to make a follow up appt. Pt has some questions for the nurse prior to making the appointment. Message sent to Tylor

## 2022-07-25 ENCOUNTER — HOSPITAL ENCOUNTER (OUTPATIENT)
Dept: RADIOLOGY | Facility: HOSPITAL | Age: 72
Discharge: HOME | End: 2022-07-25
Attending: INTERNAL MEDICINE
Payer: MEDICARE

## 2022-07-25 ENCOUNTER — TELEPHONE (OUTPATIENT)
Dept: NEUROSURGERY | Facility: CLINIC | Age: 72
End: 2022-07-25
Payer: MEDICARE

## 2022-07-25 DIAGNOSIS — G95.9 CERVICAL MYELOPATHY: ICD-10-CM

## 2022-07-25 PROCEDURE — 71046 X-RAY EXAM CHEST 2 VIEWS: CPT | Mod: 26,,, | Performed by: RADIOLOGY

## 2022-07-25 PROCEDURE — 71046 X-RAY EXAM CHEST 2 VIEWS: CPT | Mod: TC

## 2022-07-25 PROCEDURE — 71046 XR CHEST PA AND LATERAL: ICD-10-PCS | Mod: 26,,, | Performed by: RADIOLOGY

## 2022-07-25 NOTE — TELEPHONE ENCOUNTER
LM for pt, offered appt with Dr. hCeek. Advised pt call back to schedule.     ----- Message -----  From: Amy Medina  Sent: 7/22/2022  11:55 AM CDT  To: Adia Will Staff    Dr. Iris Blue has put in a referral for Consult to Neurosurgery. Please assist in scheduling.    Cervical myelopathy [G95.9]      Thanks

## 2022-07-27 ENCOUNTER — TELEPHONE (OUTPATIENT)
Dept: NEUROSURGERY | Facility: CLINIC | Age: 72
End: 2022-07-27
Payer: MEDICARE

## 2022-07-27 NOTE — TELEPHONE ENCOUNTER
ALEJANDRO pt, scheduled 8/9/22 with Dr. Cheek per PA and manager instruction. Pt happy with date and time, she had no further questions at this time. Letter mailed.     ----- Message from Dayna Joy MA sent at 7/26/2022  7:26 PM CDT -----  Contact: pt  Please call and complete since you called the patient last.    Thanks  ----- Message -----  From: Melody Acevedo MA  Sent: 7/26/2022   4:24 PM CDT  To: Adia Will Staff      ----- Message -----  From: Thanh Cortez  Sent: 7/26/2022   4:22 PM CDT  To: Melody Acevedo MA    Pt requesting call back RE: returning call        Confirmed contact below:  Contact Name:Manisha Wynne  Phone Number: [phone]

## 2022-07-28 ENCOUNTER — OFFICE VISIT (OUTPATIENT)
Dept: OPTOMETRY | Facility: CLINIC | Age: 72
End: 2022-07-28
Payer: MEDICARE

## 2022-07-28 DIAGNOSIS — Z98.42 S/P BILATERAL CATARACT EXTRACTION: Primary | ICD-10-CM

## 2022-07-28 DIAGNOSIS — Z98.41 S/P BILATERAL CATARACT EXTRACTION: Primary | ICD-10-CM

## 2022-07-28 PROCEDURE — 99024 POSTOP FOLLOW-UP VISIT: CPT | Mod: S$GLB,,, | Performed by: OPTOMETRIST

## 2022-07-28 PROCEDURE — 3288F PR FALLS RISK ASSESSMENT DOCUMENTED: ICD-10-PCS | Mod: CPTII,S$GLB,, | Performed by: OPTOMETRIST

## 2022-07-28 PROCEDURE — 1157F PR ADVANCE CARE PLAN OR EQUIV PRESENT IN MEDICAL RECORD: ICD-10-PCS | Mod: CPTII,S$GLB,, | Performed by: OPTOMETRIST

## 2022-07-28 PROCEDURE — 99999 PR PBB SHADOW E&M-EST. PATIENT-LVL III: ICD-10-PCS | Mod: PBBFAC,,, | Performed by: OPTOMETRIST

## 2022-07-28 PROCEDURE — 1157F ADVNC CARE PLAN IN RCRD: CPT | Mod: CPTII,S$GLB,, | Performed by: OPTOMETRIST

## 2022-07-28 PROCEDURE — 99999 PR PBB SHADOW E&M-EST. PATIENT-LVL III: CPT | Mod: PBBFAC,,, | Performed by: OPTOMETRIST

## 2022-07-28 PROCEDURE — 1126F PR PAIN SEVERITY QUANTIFIED, NO PAIN PRESENT: ICD-10-PCS | Mod: CPTII,S$GLB,, | Performed by: OPTOMETRIST

## 2022-07-28 PROCEDURE — 1159F PR MEDICATION LIST DOCUMENTED IN MEDICAL RECORD: ICD-10-PCS | Mod: CPTII,S$GLB,, | Performed by: OPTOMETRIST

## 2022-07-28 PROCEDURE — 4010F PR ACE/ARB THEARPY RXD/TAKEN: ICD-10-PCS | Mod: CPTII,S$GLB,, | Performed by: OPTOMETRIST

## 2022-07-28 PROCEDURE — 1101F PT FALLS ASSESS-DOCD LE1/YR: CPT | Mod: CPTII,S$GLB,, | Performed by: OPTOMETRIST

## 2022-07-28 PROCEDURE — 99024 PR POST-OP FOLLOW-UP VISIT: ICD-10-PCS | Mod: S$GLB,,, | Performed by: OPTOMETRIST

## 2022-07-28 PROCEDURE — 4010F ACE/ARB THERAPY RXD/TAKEN: CPT | Mod: CPTII,S$GLB,, | Performed by: OPTOMETRIST

## 2022-07-28 PROCEDURE — 1159F MED LIST DOCD IN RCRD: CPT | Mod: CPTII,S$GLB,, | Performed by: OPTOMETRIST

## 2022-07-28 PROCEDURE — 3288F FALL RISK ASSESSMENT DOCD: CPT | Mod: CPTII,S$GLB,, | Performed by: OPTOMETRIST

## 2022-07-28 PROCEDURE — 1126F AMNT PAIN NOTED NONE PRSNT: CPT | Mod: CPTII,S$GLB,, | Performed by: OPTOMETRIST

## 2022-07-28 PROCEDURE — 1101F PR PT FALLS ASSESS DOC 0-1 FALLS W/OUT INJ PAST YR: ICD-10-PCS | Mod: CPTII,S$GLB,, | Performed by: OPTOMETRIST

## 2022-07-28 NOTE — PROGRESS NOTES
HPI     Post-op Evaluation      Additional comments: 5 wk phaco OS              Comments     Last eye exam was 6/21/22 with Dr. Aguilera.  Patient states distance vision is better since cataract sx's. Has to use   readers all the time.    Systane prn OU    05/16/2022 IMPLANT: diboo 18.5 OD  06/20/2022 IMPLANT: diboo 19.5 OS          Last edited by Pari Curran MA on 7/28/2022 10:43 AM. (History)            Assessment /Plan     For exam results, see Encounter Report.    S/P bilateral cataract extraction  -     OCT- Retina          1.  Pt doing well.  No rx given.  Recommend +1.75 OTC readers.  No CME OU.  Retina flat and intact OU--no holes, tears, breaks, or RDs.    RTC 1 year for routine exam.

## 2022-08-01 ENCOUNTER — TELEPHONE (OUTPATIENT)
Dept: NEUROSURGERY | Facility: CLINIC | Age: 72
End: 2022-08-01
Payer: MEDICARE

## 2022-08-02 ENCOUNTER — APPOINTMENT (OUTPATIENT)
Dept: RADIOLOGY | Facility: CLINIC | Age: 72
End: 2022-08-02
Attending: INTERNAL MEDICINE
Payer: MEDICARE

## 2022-08-02 DIAGNOSIS — M81.8 OTHER OSTEOPOROSIS WITHOUT CURRENT PATHOLOGICAL FRACTURE: ICD-10-CM

## 2022-08-02 PROCEDURE — 77080 DXA BONE DENSITY AXIAL: CPT | Mod: 26,,, | Performed by: INTERNAL MEDICINE

## 2022-08-02 PROCEDURE — 77080 DXA BONE DENSITY AXIAL: CPT | Mod: TC,PO

## 2022-08-02 PROCEDURE — 77080 DEXA BONE DENSITY SPINE HIP: ICD-10-PCS | Mod: 26,,, | Performed by: INTERNAL MEDICINE

## 2022-08-03 ENCOUNTER — TELEPHONE (OUTPATIENT)
Dept: NEUROSURGERY | Facility: CLINIC | Age: 72
End: 2022-08-03
Payer: MEDICARE

## 2022-08-04 ENCOUNTER — OFFICE VISIT (OUTPATIENT)
Dept: NEUROSURGERY | Facility: CLINIC | Age: 72
End: 2022-08-04
Payer: MEDICARE

## 2022-08-04 VITALS
HEART RATE: 66 BPM | WEIGHT: 160.25 LBS | SYSTOLIC BLOOD PRESSURE: 106 MMHG | BODY MASS INDEX: 24.37 KG/M2 | DIASTOLIC BLOOD PRESSURE: 68 MMHG

## 2022-08-04 DIAGNOSIS — R25.1 TREMOR: ICD-10-CM

## 2022-08-04 DIAGNOSIS — G95.9 CERVICAL MYELOPATHY: Primary | ICD-10-CM

## 2022-08-04 DIAGNOSIS — M48.02 SPINAL STENOSIS, CERVICAL REGION: ICD-10-CM

## 2022-08-04 PROCEDURE — 1125F PR PAIN SEVERITY QUANTIFIED, PAIN PRESENT: ICD-10-PCS | Mod: CPTII,S$GLB,, | Performed by: NEUROLOGICAL SURGERY

## 2022-08-04 PROCEDURE — 99214 PR OFFICE/OUTPT VISIT, EST, LEVL IV, 30-39 MIN: ICD-10-PCS | Mod: S$GLB,,, | Performed by: NEUROLOGICAL SURGERY

## 2022-08-04 PROCEDURE — 3078F DIAST BP <80 MM HG: CPT | Mod: CPTII,S$GLB,, | Performed by: NEUROLOGICAL SURGERY

## 2022-08-04 PROCEDURE — 1159F PR MEDICATION LIST DOCUMENTED IN MEDICAL RECORD: ICD-10-PCS | Mod: CPTII,S$GLB,, | Performed by: NEUROLOGICAL SURGERY

## 2022-08-04 PROCEDURE — 4010F PR ACE/ARB THEARPY RXD/TAKEN: ICD-10-PCS | Mod: CPTII,S$GLB,, | Performed by: NEUROLOGICAL SURGERY

## 2022-08-04 PROCEDURE — 3078F PR MOST RECENT DIASTOLIC BLOOD PRESSURE < 80 MM HG: ICD-10-PCS | Mod: CPTII,S$GLB,, | Performed by: NEUROLOGICAL SURGERY

## 2022-08-04 PROCEDURE — 1101F PT FALLS ASSESS-DOCD LE1/YR: CPT | Mod: CPTII,S$GLB,, | Performed by: NEUROLOGICAL SURGERY

## 2022-08-04 PROCEDURE — 3074F PR MOST RECENT SYSTOLIC BLOOD PRESSURE < 130 MM HG: ICD-10-PCS | Mod: CPTII,S$GLB,, | Performed by: NEUROLOGICAL SURGERY

## 2022-08-04 PROCEDURE — 1159F MED LIST DOCD IN RCRD: CPT | Mod: CPTII,S$GLB,, | Performed by: NEUROLOGICAL SURGERY

## 2022-08-04 PROCEDURE — 99999 PR PBB SHADOW E&M-EST. PATIENT-LVL IV: ICD-10-PCS | Mod: PBBFAC,,, | Performed by: NEUROLOGICAL SURGERY

## 2022-08-04 PROCEDURE — 3008F BODY MASS INDEX DOCD: CPT | Mod: CPTII,S$GLB,, | Performed by: NEUROLOGICAL SURGERY

## 2022-08-04 PROCEDURE — 99999 PR PBB SHADOW E&M-EST. PATIENT-LVL IV: CPT | Mod: PBBFAC,,, | Performed by: NEUROLOGICAL SURGERY

## 2022-08-04 PROCEDURE — 1101F PR PT FALLS ASSESS DOC 0-1 FALLS W/OUT INJ PAST YR: ICD-10-PCS | Mod: CPTII,S$GLB,, | Performed by: NEUROLOGICAL SURGERY

## 2022-08-04 PROCEDURE — 1157F PR ADVANCE CARE PLAN OR EQUIV PRESENT IN MEDICAL RECORD: ICD-10-PCS | Mod: CPTII,S$GLB,, | Performed by: NEUROLOGICAL SURGERY

## 2022-08-04 PROCEDURE — 1157F ADVNC CARE PLAN IN RCRD: CPT | Mod: CPTII,S$GLB,, | Performed by: NEUROLOGICAL SURGERY

## 2022-08-04 PROCEDURE — 3288F FALL RISK ASSESSMENT DOCD: CPT | Mod: CPTII,S$GLB,, | Performed by: NEUROLOGICAL SURGERY

## 2022-08-04 PROCEDURE — 3074F SYST BP LT 130 MM HG: CPT | Mod: CPTII,S$GLB,, | Performed by: NEUROLOGICAL SURGERY

## 2022-08-04 PROCEDURE — 1125F AMNT PAIN NOTED PAIN PRSNT: CPT | Mod: CPTII,S$GLB,, | Performed by: NEUROLOGICAL SURGERY

## 2022-08-04 PROCEDURE — 99214 OFFICE O/P EST MOD 30 MIN: CPT | Mod: S$GLB,,, | Performed by: NEUROLOGICAL SURGERY

## 2022-08-04 PROCEDURE — 4010F ACE/ARB THERAPY RXD/TAKEN: CPT | Mod: CPTII,S$GLB,, | Performed by: NEUROLOGICAL SURGERY

## 2022-08-04 PROCEDURE — 3008F PR BODY MASS INDEX (BMI) DOCUMENTED: ICD-10-PCS | Mod: CPTII,S$GLB,, | Performed by: NEUROLOGICAL SURGERY

## 2022-08-04 PROCEDURE — 3288F PR FALLS RISK ASSESSMENT DOCUMENTED: ICD-10-PCS | Mod: CPTII,S$GLB,, | Performed by: NEUROLOGICAL SURGERY

## 2022-08-04 NOTE — PROGRESS NOTES
"Neurosurgery  Follow up     SUBJECTIVE:     Chief Complaint: "here for neck evaluation"     History of Present Illness:  Manisha Wynne is a 71 y.o. female (former  at [employer]) who presents as a referral from Dr. Mcduffie for evaluation of cervical myelopathy. The patient reports that she has had several falls from [id number], which prompted her to seek neurologic evaluation. The falls have resulted in injuries, including breaking her wrist. She also has numbness, tingling, and burning in her feet. She denies pain in her neck, but endorses in her mid- and low back. Her pain is 10/10 in the legs, 10/10 in the back, and 0/10 in the neck and arms. The pain is intermittent. It is made worse by sitting. It improves with standing. She denies any change in bowel/bladder habit.     She endorses handwriting changes, dropping objects, and balance/gait difficulty. She denies fine motor changes. She has not seen pain management for her neck, but she is seeing Dr. Booker for "sciatica."     CT of the cervical spine (the patient has a non-MRI-conditional compatible pacemaker in place) was obtained and personally reviewed, demonstrating significant stenosis at C5-C6>C4-C5. Flex ex shows some mild instability of C3 on C4.     She lives alone with her tuxedo cats. Her family is in [place] and [facility]. She does have friends nearby who will help to care for her in perioperative periods. She takes ASA 81 daily.     As of 5/6/21, the patient had the myelogram as requested. This is personally reviewed and demonstrates significant cord compression and OPLL. There is some stenosis at L4-L5 as well.     The patient reports that she has been well. She has had no falls. She has been doing PT for her sciatica, which she has found helpful. She does note that her tremor, which may be progressing slightly, is now interfering with her applying makeup at times.     As of 5/25/21, the patient reports that she has not had any significant " changes. She presents today with a list of questions, together with friend Kavya Roberts on the phone. She also endorses a several year history of urinary incontinence (episodes occur overnight or with coughing) for which she was going to participate in pelvic floor rehab and see urogyn; however, this was interrupted by Covid.     As of 1/11/22, the patient reports that she has had ongoing C. Diff since we scheduled surgery. She was seen about a potential colonoscopy today, but was advised against proceeding. She is due for a CT of the abdomen/pelvis next week to look at the pancreas. She is also being considered for a fecal transplant. There is also a clinical trial going on in which she's interested.     Dr. Maradiaga of ID has been quarterbacking the C. Diff response. The patient is frustrated by her lack of improvement.     She is less fatigued now than when she initially became sick, but she does not feel she has yet recovered to her baseline. She is also being recommended for cataract surgery and pacemaker generator replacement in about 13 months.     As of 8/4/22, the patient reports that she is finally over her C. Diff. 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 She notes that she has been really careful. She walks very slowly. She has noticed some tremor in her hands at rest. This gets better when she holds onto an object. She denies anosmia. She denies significant sleep difficulties and other non-motor symptoms of PD.      Review of patient's allergies indicates:  No Known Allergies    Current Outpatient Medications   Medication Sig Dispense Refill    aspirin (ECOTRIN) 81 MG EC tablet Take 81 mg by mouth. 1 Tablet, Delayed Release (E.C.) Oral Every day      buPROPion (WELLBUTRIN XL) 300 MG 24 hr tablet Take 300 mg by mouth once daily.       calcium-vitamin D 600 mg(1,500mg) -200 unit Tab Take 1 tablet by mouth once daily.       cholecalciferol, vitamin D3, 125 mcg (5,000 unit) Tab  Take 5,000 Units by mouth once daily.      dicyclomine (BENTYL) 20 mg tablet Take 20 mg by mouth every 6 (six) hours.      estradioL (ESTRACE) 0.01 % (0.1 mg/gram) vaginal cream Place 1 g vaginally twice a week. 42.5 g 2    FLUoxetine 40 MG capsule Take 40 mg by mouth once daily.      gabapentin (NEURONTIN) 100 MG capsule Take 100 mg by mouth 3 (three) times daily.      lisdexamfetamine (VYVANSE) 70 MG capsule Take 1 capsule (70 mg total) by mouth every morning. 30 capsule 0    losartan (COZAAR) 25 MG tablet TAKE 1 TABLET EVERY DAY 90 tablet 6    metoprolol tartrate (LOPRESSOR) 25 MG tablet TAKE 1 TABLET TWICE DAILY 180 tablet 3    pravastatin (PRAVACHOL) 20 MG tablet TAKE 1 TABLET EVERY DAY 90 tablet 3    PROLIA 60 mg/mL Syrg every 6 (six) months.       Current Facility-Administered Medications   Medication Dose Route Frequency Provider Last Rate Last Admin    sodium chloride 0.9% flush 10 mL  10 mL Intravenous PRN Ines Aguilera MD           Past Medical History:   Diagnosis Date    Abnormal Pap smear     Atrial fibrillation     AV block, 1st degree 07/25/2012    C. difficile diarrhea     RESOLVED    Cataract     Depression 07/24/2012    Facet arthritis of lumbar region 03/31/2015    Falls     3 falls in the last 6 mos--noted 6/19/19    Hyperlipidemia     Hypertension 07/24/2012    Neuropathy     Other specified cardiac dysrhythmias(427.89)     Sleep apnea     Syncope 07/24/2012     Past Surgical History:   Procedure Laterality Date    APPLICATION OF CARTILAGE GRAFT Bilateral 12/19/2019    Procedure: APPLICATION, CARTILAGE GRAFT AURICULAR JEZ;  Surgeon: Martinez Flores III, MD;  Location: [facility];  Service: ENT;  Laterality: Bilateral;    CARDIAC PACEMAKER PLACEMENT  09/07/2012    [id number]    CATARACT EXTRACTION W/  INTRAOCULAR LENS IMPLANT Right 5/16/2022    Procedure: EXTRACTION, CATARACT, WITH IOL INSERTION;  Surgeon: Ines Aguilera MD;  Location: [facility];   Service: Ophthalmology;  Laterality: Right;  Catalys     CATARACT EXTRACTION W/  INTRAOCULAR LENS IMPLANT Left 2022    Procedure: EXTRACTION, CATARACT, WITH IOL INSERTION;  Surgeon: Ines Aguilera MD;  Location: [facility];  Service: Ophthalmology;  Laterality: Left;  Catalys     DILATION AND CURETTAGE OF UTERUS      Hx of precancerous cells?    ENDOSCOPIC ULTRASOUND OF UPPER GASTROINTESTINAL TRACT N/A 2019    Procedure: ULTRASOUND, UPPER GI TRACT, ENDOSCOPIC;  Surgeon: Kev Calderon MD;  Location: [facility] (2ND FLR);  Service: Endoscopy;  Laterality: N/A;  Pacemaker-Adam   appt confirmed-rb    MYELOGRAPHY N/A 2021    Procedure: Myelogram  CERVICAL, THORACIC AND LUMBAR;  Surgeon: [facility];  Location: [facility] 2ND FLR;  Service: Radiology;  Laterality: N/A;    NASAL SEPTOPLASTY N/A 2019    Procedure: SEPTOPLASTY, NOSE;  Surgeon: Martinez Flores III, MD;  Location: [facility];  Service: ENT;  Laterality: N/A;  FOLLOW DR SALVATORE KIMBROUGH PROTOCOL    OPEN REDUCTION AND INTERNAL FIXATION (ORIF) OF FRACTURE OF DISTAL RADIUS Left 2020    Procedure: ORIF, FRACTURE, RADIUS, DISTAL-left;  Surgeon: Ellen Moe MD;  Location: [facility];  Service: Orthopedics;  Laterality: Left;    SURGICAL REMOVAL OF NASAL TURBINATE Bilateral 2019    Procedure: EXCISION, NASAL TURBINATE;  Surgeon: Martinez Flores III, MD;  Location: [facility];  Service: ENT;  Laterality: Bilateral;    TONSILLECTOMY      WISDOM TOOTH EXTRACTION       Family History    None       Social History     Socioeconomic History    Marital status:    Occupational History    Occupation: /Rabbi     Employer: [employer]   Tobacco Use    Smoking status: Former Smoker     Packs/day: 0.25     Years: 15.00     Pack years: 3.75     Quit date: 1982     Years since quittin.0    Smokeless tobacco: Former User   Substance and Sexual Activity    Alcohol use: Yes     Comment: no daily or  heavy use, ~4 times per month    Drug use: No    Sexual activity: Not Currently     Social Determinants of Health     Financial Resource Strain: Low Risk     Difficulty of Paying Living Expenses: Not very hard   Food Insecurity: No Food Insecurity    Worried About Running Out of Food in the Last Year: Never true    Ran Out of Food in the Last Year: Never true   Transportation Needs: No Transportation Needs    Lack of Transportation (Medical): No    Lack of Transportation (Non-Medical): No   Physical Activity: Insufficiently Active    Days of Exercise per Week: 1 day    Minutes of Exercise per Session: 60 min   Stress: Stress Concern Present    Feeling of Stress : To some extent   Social Connections: Moderately Integrated    Frequency of Communication with Friends and Family: More than three times a week    Frequency of Social Gatherings with Friends and Family: Once a week    Attends Gnosticism Services: More than 4 times per year    Active Member of Clubs or Organizations: Yes    Attends Club or Organization Meetings: More than 4 times per year    Marital Status:    Housing Stability: Low Risk     Unable to Pay for Housing in the Last Year: No    Number of Places Lived in the Last Year: 1    Unstable Housing in the Last Year: No       Review of Systems   Constitutional: Negative for fever.        Weight loss   HENT: Negative for nosebleeds.    Eyes: Positive for visual disturbance.   Respiratory: Negative for shortness of breath.    Cardiovascular: Negative for chest pain.   Gastrointestinal: Negative for constipation.   Endocrine: Negative for cold intolerance.   Genitourinary: Negative for difficulty urinating.   Musculoskeletal: Positive for back pain and gait problem. Negative for neck pain.   Skin: Negative for color change.   Neurological: Negative for headaches.   Hematological: Does not bruise/bleed easily.   Psychiatric/Behavioral: Positive for dysphoric mood. The patient is  nervous/anxious.        OBJECTIVE:     Vital Signs     There is no height or weight on file to calculate BMI.    Physical Exam:     Constitutional: She appears well-developed and well-nourished. No distress.      Eyes: EOM are normal.      Abdominal: Soft.      Skin: Skin displays no rash on extremities. Skin displays no lesions on extremities.     Psych/Behavior: She is alert. She is oriented to person, place, and time.     Musculoskeletal:      Comments: Guarded, myelopathic gait  Hand intrinsics 4/5 bilaterally   Biceps 4+ on R, 4 on L    Decreased arm swing on right     Neurological:        Coordination: She has normal finger to nose coordination.   + Maria's bilaterally   Tremulous with R>L resting tremor      Pulmonary: symmetric bilaterally     Diagnostic Results:  No new     ASSESSMENT/PLAN:     Manisha Wynne is a 71 y.o. female with cervical stenosis and myelopathy. She has an MRI-non-compatible pacemaker in place. This will be due for replacement within the next few months.     Given the results of the myelogram, I expect that she would benefit from C3-C6 laminectomy and fusion, with undercutting of the C2 lamina. We had a lengthy and pleasant conversation about this procedure, including the associated hospitalization. We also discussed appropriate cat care modifications and obtaining a shower chair for the perioperative period.     She is interested in considering surgery in mid-October because of the Congregational high holidays.     Because it has been so long since she had imaging, I would like her to obtain flexion/extension X-rays and CT cervical spine to assess for any interval change that would change our operative plan.     I would like her to be evaluated by Sunitha Rosario since Dr. Mcduffie is no longer at [facility].     Risks include, but are not limited to, bleeding, pain, infection, scarring/poor wound healing, need for further/repeat procedure, death, paralysis, blindness, pseudoarthrosis, C5  palsy, stroke/damage to major blood vessels, leak of cerebrospinal fluid, and hardware-related complications. Informed consent was obtained. We discussed that she should expect significant pain in the immediate postop period. We again discussed that the goal of a cervical surgery is to prevent worsening, not necessarily to be able to guarantee improved function. She expressed understanding.     We will have her see Dr. Bleu or Dr. Archer for medical optimization. She will need to be off of ASA for one week prior to surgery.     I have cautioned her not to climb ladders or otherwise put herself at increased risk for fall. She expresses understanding.     I have encouraged her to contact the clinic in interim with any questions, concerns, or adverse clinical change.

## 2022-08-05 ENCOUNTER — LAB VISIT (OUTPATIENT)
Dept: LAB | Facility: HOSPITAL | Age: 72
End: 2022-08-05
Attending: INTERNAL MEDICINE
Payer: MEDICARE

## 2022-08-05 ENCOUNTER — HOSPITAL ENCOUNTER (OUTPATIENT)
Dept: ENDOCRINOLOGY | Facility: CLINIC | Age: 72
Discharge: HOME | End: 2022-08-05
Attending: INTERNAL MEDICINE
Payer: MEDICARE

## 2022-08-05 DIAGNOSIS — E78.5 HYPERLIPIDEMIA, UNSPECIFIED HYPERLIPIDEMIA TYPE: ICD-10-CM

## 2022-08-05 DIAGNOSIS — E04.1 THYROID NODULE: ICD-10-CM

## 2022-08-05 DIAGNOSIS — R73.9 HYPERGLYCEMIA: ICD-10-CM

## 2022-08-05 DIAGNOSIS — E21.0 PRIMARY HYPERPARATHYROIDISM: ICD-10-CM

## 2022-08-05 DIAGNOSIS — D64.9 ANEMIA, UNSPECIFIED TYPE: ICD-10-CM

## 2022-08-05 DIAGNOSIS — M81.8 OTHER OSTEOPOROSIS WITHOUT CURRENT PATHOLOGICAL FRACTURE: ICD-10-CM

## 2022-08-05 LAB
25(OH)D3+25(OH)D2 SERPL-MCNC: 44 NG/ML (ref 30–96)
ALBUMIN SERPL BCP-MCNC: 3.6 G/DL (ref 3.5–5.2)
ALP SERPL-CCNC: 44 U/L (ref 55–135)
ALT SERPL W/O P-5'-P-CCNC: 42 U/L (ref 10–44)
ANION GAP SERPL CALC-SCNC: 6 MMOL/L (ref 8–16)
AST SERPL-CCNC: 46 U/L (ref 10–40)
BASOPHILS # BLD AUTO: 0.04 K/UL (ref 0–0.2)
BASOPHILS NFR BLD: 0.7 % (ref 0–1.9)
BILIRUB SERPL-MCNC: 0.4 MG/DL (ref 0.1–1)
BUN SERPL-MCNC: 22 MG/DL (ref 8–23)
CALCIUM SERPL-MCNC: 9.8 MG/DL (ref 8.7–10.5)
CHLORIDE SERPL-SCNC: 108 MMOL/L (ref 95–110)
CHOLEST SERPL-MCNC: 176 MG/DL (ref 120–199)
CHOLEST/HDLC SERPL: 2.7 {RATIO} (ref 2–5)
CO2 SERPL-SCNC: 26 MMOL/L (ref 23–29)
CREAT SERPL-MCNC: 1 MG/DL (ref 0.5–1.4)
DIFFERENTIAL METHOD: ABNORMAL
EOSINOPHIL # BLD AUTO: 0.3 K/UL (ref 0–0.5)
EOSINOPHIL NFR BLD: 5.5 % (ref 0–8)
ERYTHROCYTE [DISTWIDTH] IN BLOOD BY AUTOMATED COUNT: 13.6 % (ref 11.5–14.5)
EST. GFR  (NO RACE VARIABLE): 59.5 ML/MIN/1.73 M^2
ESTIMATED AVG GLUCOSE: 105 MG/DL (ref 68–131)
FERRITIN SERPL-MCNC: 77 NG/ML (ref 20–300)
GLUCOSE SERPL-MCNC: 92 MG/DL (ref 70–110)
HBA1C MFR BLD: 5.3 % (ref 4–5.6)
HCT VFR BLD AUTO: 33 % (ref 37–48.5)
HDLC SERPL-MCNC: 66 MG/DL (ref 40–75)
HDLC SERPL: 37.5 % (ref 20–50)
HGB BLD-MCNC: 10.7 G/DL (ref 12–16)
IMM GRANULOCYTES # BLD AUTO: 0.01 K/UL (ref 0–0.04)
IMM GRANULOCYTES NFR BLD AUTO: 0.2 % (ref 0–0.5)
IRON SERPL-MCNC: 49 UG/DL (ref 30–160)
LDLC SERPL CALC-MCNC: 94.4 MG/DL (ref 63–159)
LYMPHOCYTES # BLD AUTO: 1.6 K/UL (ref 1–4.8)
LYMPHOCYTES NFR BLD: 28.6 % (ref 18–48)
MCH RBC QN AUTO: 30.5 PG (ref 27–31)
MCHC RBC AUTO-ENTMCNC: 32.4 G/DL (ref 32–36)
MCV RBC AUTO: 94 FL (ref 82–98)
MONOCYTES # BLD AUTO: 0.8 K/UL (ref 0.3–1)
MONOCYTES NFR BLD: 13.8 % (ref 4–15)
NEUTROPHILS # BLD AUTO: 2.9 K/UL (ref 1.8–7.7)
NEUTROPHILS NFR BLD: 51.2 % (ref 38–73)
NONHDLC SERPL-MCNC: 110 MG/DL
NRBC BLD-RTO: 0 /100 WBC
PLATELET # BLD AUTO: 151 K/UL (ref 150–450)
PMV BLD AUTO: 11 FL (ref 9.2–12.9)
POTASSIUM SERPL-SCNC: 5 MMOL/L (ref 3.5–5.1)
PROT SERPL-MCNC: 6.5 G/DL (ref 6–8.4)
RBC # BLD AUTO: 3.51 M/UL (ref 4–5.4)
SATURATED IRON: 15 % (ref 20–50)
SODIUM SERPL-SCNC: 140 MMOL/L (ref 136–145)
TOTAL IRON BINDING CAPACITY: 337 UG/DL (ref 250–450)
TRANSFERRIN SERPL-MCNC: 228 MG/DL (ref 200–375)
TRIGL SERPL-MCNC: 78 MG/DL (ref 30–150)
TSH SERPL DL<=0.005 MIU/L-ACNC: 2.71 UIU/ML (ref 0.4–4)
WBC # BLD AUTO: 5.67 K/UL (ref 3.9–12.7)

## 2022-08-05 PROCEDURE — 84466 ASSAY OF TRANSFERRIN: CPT | Performed by: INTERNAL MEDICINE

## 2022-08-05 PROCEDURE — 83036 HEMOGLOBIN GLYCOSYLATED A1C: CPT | Performed by: INTERNAL MEDICINE

## 2022-08-05 PROCEDURE — 82728 ASSAY OF FERRITIN: CPT | Performed by: INTERNAL MEDICINE

## 2022-08-05 PROCEDURE — 76536 US SOFT TISSUE HEAD NECK THYROID: ICD-10-PCS | Mod: S$GLB,,, | Performed by: INTERNAL MEDICINE

## 2022-08-05 PROCEDURE — 36415 COLL VENOUS BLD VENIPUNCTURE: CPT | Performed by: INTERNAL MEDICINE

## 2022-08-05 PROCEDURE — 76536 US EXAM OF HEAD AND NECK: CPT | Mod: S$GLB,,, | Performed by: INTERNAL MEDICINE

## 2022-08-05 PROCEDURE — 84443 ASSAY THYROID STIM HORMONE: CPT | Performed by: INTERNAL MEDICINE

## 2022-08-05 PROCEDURE — 85025 COMPLETE CBC W/AUTO DIFF WBC: CPT | Performed by: INTERNAL MEDICINE

## 2022-08-05 PROCEDURE — 82306 VITAMIN D 25 HYDROXY: CPT | Performed by: INTERNAL MEDICINE

## 2022-08-05 PROCEDURE — 80061 LIPID PANEL: CPT | Performed by: INTERNAL MEDICINE

## 2022-08-05 PROCEDURE — 80053 COMPREHEN METABOLIC PANEL: CPT | Performed by: INTERNAL MEDICINE

## 2022-08-10 ENCOUNTER — TELEPHONE (OUTPATIENT)
Dept: RESEARCH | Facility: HOSPITAL | Age: 72
End: 2022-08-10
Payer: MEDICARE

## 2022-08-10 ENCOUNTER — RESEARCH ENCOUNTER (OUTPATIENT)
Dept: INFECTIOUS DISEASES | Facility: CLINIC | Age: 72
End: 2022-08-10
Payer: MEDICARE

## 2022-08-10 NOTE — TELEPHONE ENCOUNTER
Study Name: A Phase 3 Open-Label Clinical Study to Evaluate the Safety and Tolerability of Rebiotix [id number] (microbiota suspension) in Subjects with Recurrent Clostridium difficile Infection; IRB#[id number]   Sponsor: Dawna  PI: Shena Garcia     Subject: [id number]    Study staff attempted to call Ms. Wynne yesterday 9Aug2022 and today 10Aug2022; A voicemail was left for her to return my call on both days.    She is due for the 6-Month Phone Assessment for the Rebiotix study.

## 2022-08-10 NOTE — PROGRESS NOTES
"Study Name: A Phase 3 Open-Label Clinical Study to Evaluate the Safety and Tolerability of Rebiotix [id number] (microbiota suspension) in Subjects with Recurrent Clostridium difficile Infection; IRB#[id number]   Sponsor: Dawna  PI: Shena Garcia     Follow-up Month 6  Phone Assessment     Subject: [id number]     Study staff spoke to Ms. Wynne on the telephone for her 6-month follow-up from receiving study enema. Ms. Wynne told study staff that she has not had any diarrhea that meet C.Diff criteria. She reported having a couple "scares" with diarrhea but never any qualifying episodes. She thinks her anxiety gets bad when her stool changes in any consistency because she is so scared to get C.Diff again. She is now working part-time without restrictions.     Ms. Wynne completed the C. Diff study questionnaire. No AE's or GRABIEL's assessed. Concomitant Medication Log will be updated accordingly. Per study weight obtained.     All other required assessments per protocol completed during visit and documented on site specific case report form. Source for specific data points can be located in subjects binder.            "

## 2022-08-11 ENCOUNTER — OFFICE VISIT (OUTPATIENT)
Dept: ENDOCRINOLOGY | Facility: CLINIC | Age: 72
End: 2022-08-11
Payer: MEDICARE

## 2022-08-11 VITALS
OXYGEN SATURATION: 99 % | SYSTOLIC BLOOD PRESSURE: 130 MMHG | BODY MASS INDEX: 24.18 KG/M2 | HEART RATE: 64 BPM | DIASTOLIC BLOOD PRESSURE: 74 MMHG | RESPIRATION RATE: 16 BRPM | WEIGHT: 159 LBS

## 2022-08-11 DIAGNOSIS — I10 PRIMARY HYPERTENSION: Chronic | ICD-10-CM

## 2022-08-11 DIAGNOSIS — N18.31 STAGE 3A CHRONIC KIDNEY DISEASE: ICD-10-CM

## 2022-08-11 DIAGNOSIS — M81.8 OTHER OSTEOPOROSIS WITHOUT CURRENT PATHOLOGICAL FRACTURE: ICD-10-CM

## 2022-08-11 DIAGNOSIS — E21.0 PRIMARY HYPERPARATHYROIDISM: Primary | ICD-10-CM

## 2022-08-11 PROCEDURE — 1125F PR PAIN SEVERITY QUANTIFIED, PAIN PRESENT: ICD-10-PCS | Mod: CPTII,S$GLB,, | Performed by: INTERNAL MEDICINE

## 2022-08-11 PROCEDURE — 99214 PR OFFICE/OUTPT VISIT, EST, LEVL IV, 30-39 MIN: ICD-10-PCS | Mod: S$GLB,,, | Performed by: INTERNAL MEDICINE

## 2022-08-11 PROCEDURE — 1159F MED LIST DOCD IN RCRD: CPT | Mod: CPTII,S$GLB,, | Performed by: INTERNAL MEDICINE

## 2022-08-11 PROCEDURE — 99999 PR PBB SHADOW E&M-EST. PATIENT-LVL III: ICD-10-PCS | Mod: PBBFAC,,, | Performed by: INTERNAL MEDICINE

## 2022-08-11 PROCEDURE — 4010F PR ACE/ARB THEARPY RXD/TAKEN: ICD-10-PCS | Mod: CPTII,S$GLB,, | Performed by: INTERNAL MEDICINE

## 2022-08-11 PROCEDURE — 3044F PR MOST RECENT HEMOGLOBIN A1C LEVEL <7.0%: ICD-10-PCS | Mod: CPTII,S$GLB,, | Performed by: INTERNAL MEDICINE

## 2022-08-11 PROCEDURE — 3078F DIAST BP <80 MM HG: CPT | Mod: CPTII,S$GLB,, | Performed by: INTERNAL MEDICINE

## 2022-08-11 PROCEDURE — 3075F PR MOST RECENT SYSTOLIC BLOOD PRESS GE 130-139MM HG: ICD-10-PCS | Mod: CPTII,S$GLB,, | Performed by: INTERNAL MEDICINE

## 2022-08-11 PROCEDURE — 3044F HG A1C LEVEL LT 7.0%: CPT | Mod: CPTII,S$GLB,, | Performed by: INTERNAL MEDICINE

## 2022-08-11 PROCEDURE — 99999 PR PBB SHADOW E&M-EST. PATIENT-LVL III: CPT | Mod: PBBFAC,,, | Performed by: INTERNAL MEDICINE

## 2022-08-11 PROCEDURE — 1157F ADVNC CARE PLAN IN RCRD: CPT | Mod: CPTII,S$GLB,, | Performed by: INTERNAL MEDICINE

## 2022-08-11 PROCEDURE — 1160F RVW MEDS BY RX/DR IN RCRD: CPT | Mod: CPTII,S$GLB,, | Performed by: INTERNAL MEDICINE

## 2022-08-11 PROCEDURE — 4010F ACE/ARB THERAPY RXD/TAKEN: CPT | Mod: CPTII,S$GLB,, | Performed by: INTERNAL MEDICINE

## 2022-08-11 PROCEDURE — 1160F PR REVIEW ALL MEDS BY PRESCRIBER/CLIN PHARMACIST DOCUMENTED: ICD-10-PCS | Mod: CPTII,S$GLB,, | Performed by: INTERNAL MEDICINE

## 2022-08-11 PROCEDURE — 3075F SYST BP GE 130 - 139MM HG: CPT | Mod: CPTII,S$GLB,, | Performed by: INTERNAL MEDICINE

## 2022-08-11 PROCEDURE — 3078F PR MOST RECENT DIASTOLIC BLOOD PRESSURE < 80 MM HG: ICD-10-PCS | Mod: CPTII,S$GLB,, | Performed by: INTERNAL MEDICINE

## 2022-08-11 PROCEDURE — 3008F PR BODY MASS INDEX (BMI) DOCUMENTED: ICD-10-PCS | Mod: CPTII,S$GLB,, | Performed by: INTERNAL MEDICINE

## 2022-08-11 PROCEDURE — 1157F PR ADVANCE CARE PLAN OR EQUIV PRESENT IN MEDICAL RECORD: ICD-10-PCS | Mod: CPTII,S$GLB,, | Performed by: INTERNAL MEDICINE

## 2022-08-11 PROCEDURE — 3008F BODY MASS INDEX DOCD: CPT | Mod: CPTII,S$GLB,, | Performed by: INTERNAL MEDICINE

## 2022-08-11 PROCEDURE — 1159F PR MEDICATION LIST DOCUMENTED IN MEDICAL RECORD: ICD-10-PCS | Mod: CPTII,S$GLB,, | Performed by: INTERNAL MEDICINE

## 2022-08-11 PROCEDURE — 1125F AMNT PAIN NOTED PAIN PRSNT: CPT | Mod: CPTII,S$GLB,, | Performed by: INTERNAL MEDICINE

## 2022-08-11 PROCEDURE — 99214 OFFICE O/P EST MOD 30 MIN: CPT | Mod: S$GLB,,, | Performed by: INTERNAL MEDICINE

## 2022-08-11 NOTE — PROGRESS NOTES
Subjective:      Patient ID: Manisha Wynne is a 71 y.o. female.    Chief Complaint:   primary hyperparathyroidism:      History of Present Illness      With regards to the  primary hyperparathyroidism:  Was found to have episodic hypercalcemia on routine labs - first noted: 2019   Pth was checked:  2019 and was elevated     Indications for surgery:  osteoporosis and CKD  Parathyroid adenoma was localized --> Midline mediastinal ectopic parathyroid adenoma on CT parathyroid scan  She did see Dr Agee and med mgmt was opted for based on location      She denied current or past lithium use  Denies HCTZ use.     Parathyroid imagin19     CT neck 4D  Impression       1.  Midline mediastinal single lesion consistent with ectopic parathyroid adenoma.      22  COMPARISON:  Neck ultrasound dated 19     Impression:     1.)  Thyroid gland is normal in size with homogeneous echotexture and normal vascularity     2.) A small subcentimter cyst is seen in the left inferior pole     3.)  A small subcentimter cyst is seen in the left superior pole     4.)  A parathyroid adenoma is not visualized     RECOMMENDATIONS:  No need to repeat thyroid ultrasound in the future unless there is a clinical change.    Working part time   Daily intake of calcium is:  Taking vitamin D: 5000 iu qd     Denies constipation, depression, polyuria       With regards to the osteoporosis  Last bmd was:  22       FRAX:     18% risk of a major osteoporotic fracture in the next 10 years.     4.1% risk of hip fracture in the next 10 years.     Impression:     *Low bone mineral density  **Compared with previous DXA, BMD at the lumbar spine has increased 9.6%, the distal 1/3 radius hasremained stable and the BMD at the total hip has remained stable.      Denies kidney stones    prone to falls - sensory neuropathy - was part of a [facility] trial - stopped due to covid   Right patella fracture   2009 right wrist   2012 clavicle     2020 left wrist after a fall- Status post ORIF of the distal left radius     We   started Prolia in 2020    She did get her first dose 8/7/20       Adopted - no known FH      For her CKD--- she is followed  by Nephrology      Review of Systems  As above     Objective:   Physical Exam  Vitals reviewed.   Neck:      Thyroid: No thyromegaly.   Cardiovascular:      Rate and Rhythm: Normal rate.   Pulmonary:      Effort: Pulmonary effort is normal.   Abdominal:      Palpations: Abdomen is soft.         Body mass index is 24.18 kg/m².    Lab Review:   Lab Results   Component Value Date    HGBA1C 5.3 08/05/2022     Lab Results   Component Value Date    CHOL 176 08/05/2022    HDL 66 08/05/2022    LDLCALC 94.4 08/05/2022    TRIG 78 08/05/2022    CHOLHDL 37.5 08/05/2022     Lab Results   Component Value Date     08/05/2022    K 5.0 08/05/2022     08/05/2022    CO2 26 08/05/2022    GLU 92 08/05/2022    BUN 22 08/05/2022    CREATININE 1.0 08/05/2022    CALCIUM 9.8 08/05/2022    PROT 6.5 08/05/2022    ALBUMIN 3.6 08/05/2022    BILITOT 0.4 08/05/2022    ALKPHOS 44 (L) 08/05/2022    AST 46 (H) 08/05/2022    ALT 42 08/05/2022    ANIONGAP 6 (L) 08/05/2022    ESTGFRAFRICA 58 (A) 01/31/2022    EGFRNONAA 50 (A) 01/31/2022    TSH 2.710 08/05/2022           Assessment and Plan     Primary hyperparathyroidism   Surgical indications are ckd and osteoporosis but surgery would be more difficult due to location   For now we will observe Calcium every 6 months if hypercalcemia becomes a significant problem we can do medical management with cinacalcet       Avoid dehydration, excessive calcium supplementation and meds (HCTZ)  that can worsen hypercalcemia.       CKD (chronic kidney disease) stage 3, GFR 30-59 ml/min  As above       Other osteoporosis without current pathological fracture  Based on fragility fracture and FRAX  She is at very high risk for subsequent falls and fractures.    Continue prolia      Repeat bmd 2024      Hypertension  Controlled

## 2022-08-11 NOTE — ASSESSMENT & PLAN NOTE
Surgical indications are ckd and osteoporosis but surgery would be more difficult due to location   For now we will observe Calcium every 6 months if hypercalcemia becomes a significant problem we can do medical management with cinacalcet       Avoid dehydration, excessive calcium supplementation and meds (HCTZ)  that can worsen hypercalcemia.

## 2022-08-11 NOTE — ASSESSMENT & PLAN NOTE
Based on fragility fracture and FRAX  She is at very high risk for subsequent falls and fractures.    Continue prolia      Repeat bmd 2024

## 2022-08-12 ENCOUNTER — PATIENT MESSAGE (OUTPATIENT)
Dept: NEUROSURGERY | Facility: CLINIC | Age: 72
End: 2022-08-12
Payer: MEDICARE

## 2022-08-15 ENCOUNTER — TELEPHONE (OUTPATIENT)
Dept: ENDOCRINOLOGY | Facility: CLINIC | Age: 72
End: 2022-08-15
Payer: MEDICARE

## 2022-08-15 NOTE — TELEPHONE ENCOUNTER
----- Message from Trisha Steven RN sent at 8/15/2022  8:20 AM CDT -----  Hello!  Ms. Wynne is scheduled to come in for  a prolia injection tomorrow 8/15.   The therapy plan is . Can you please resign therapy plan 1 and delete therapy plan 2.  If you need any assistance, please let me know.    Thank you,    NUVIA Strickland RN

## 2022-08-16 ENCOUNTER — HOSPITAL ENCOUNTER (OUTPATIENT)
Dept: RADIOLOGY | Facility: HOSPITAL | Age: 72
Discharge: HOME | End: 2022-08-16
Attending: NEUROLOGICAL SURGERY
Payer: MEDICARE

## 2022-08-16 ENCOUNTER — INFUSION (OUTPATIENT)
Dept: INFECTIOUS DISEASES | Facility: HOSPITAL | Age: 72
End: 2022-08-16
Attending: INTERNAL MEDICINE
Payer: MEDICARE

## 2022-08-16 VITALS
HEART RATE: 67 BPM | OXYGEN SATURATION: 98 % | DIASTOLIC BLOOD PRESSURE: 65 MMHG | TEMPERATURE: 98 F | RESPIRATION RATE: 15 BRPM | HEIGHT: 68 IN | SYSTOLIC BLOOD PRESSURE: 156 MMHG | WEIGHT: 159.75 LBS | BODY MASS INDEX: 24.21 KG/M2

## 2022-08-16 DIAGNOSIS — M48.02 SPINAL STENOSIS, CERVICAL REGION: ICD-10-CM

## 2022-08-16 DIAGNOSIS — N18.30 STAGE 3 CHRONIC KIDNEY DISEASE, UNSPECIFIED WHETHER STAGE 3A OR 3B CKD: ICD-10-CM

## 2022-08-16 DIAGNOSIS — G95.9 CERVICAL MYELOPATHY: ICD-10-CM

## 2022-08-16 DIAGNOSIS — M81.8 OTHER OSTEOPOROSIS WITHOUT CURRENT PATHOLOGICAL FRACTURE: Primary | ICD-10-CM

## 2022-08-16 PROCEDURE — 72125 CT CERVICAL SPINE WITHOUT CONTRAST: ICD-10-PCS | Mod: 26,,, | Performed by: INTERNAL MEDICINE

## 2022-08-16 PROCEDURE — 72050 X-RAY EXAM NECK SPINE 4/5VWS: CPT | Mod: 26,,, | Performed by: RADIOLOGY

## 2022-08-16 PROCEDURE — 72050 XR CERVICAL SPINE AP LAT WITH FLEX EXTEN: ICD-10-PCS | Mod: 26,,, | Performed by: RADIOLOGY

## 2022-08-16 PROCEDURE — 72125 CT NECK SPINE W/O DYE: CPT | Mod: TC

## 2022-08-16 PROCEDURE — 96372 THER/PROPH/DIAG INJ SC/IM: CPT

## 2022-08-16 PROCEDURE — 72125 CT NECK SPINE W/O DYE: CPT | Mod: 26,,, | Performed by: INTERNAL MEDICINE

## 2022-08-16 PROCEDURE — 72050 X-RAY EXAM NECK SPINE 4/5VWS: CPT | Mod: TC

## 2022-08-16 PROCEDURE — 63600175 PHARM REV CODE 636 W HCPCS: Mod: JG | Performed by: INTERNAL MEDICINE

## 2022-08-16 RX ADMIN — DENOSUMAB 60 MG: 60 INJECTION SUBCUTANEOUS at 11:08

## 2022-08-16 NOTE — PLAN OF CARE
Problem: Fall Injury Risk  Goal: Absence of Fall and Fall-Related Injury  Outcome: Ongoing, Progressing  Intervention: Identify and Manage Contributors  Flowsheets (Taken 8/16/2022 1203)  Self-Care Promotion: independence encouraged

## 2022-08-16 NOTE — PROGRESS NOTES
Received Prolia 60 mg SQ left arm, tolerated w/o difficulty, left NAD, currently taking Vitamin D and calcium supplements. Pt denies any dental work in the last 3 months. Return appt provided.

## 2022-08-18 ENCOUNTER — TELEPHONE (OUTPATIENT)
Dept: ELECTROPHYSIOLOGY | Facility: CLINIC | Age: 72
End: 2022-08-18
Payer: MEDICARE

## 2022-08-18 ENCOUNTER — CLINICAL SUPPORT (OUTPATIENT)
Dept: CARDIOLOGY | Facility: HOSPITAL | Age: 72
End: 2022-08-18
Attending: INTERNAL MEDICINE
Payer: MEDICARE

## 2022-08-18 DIAGNOSIS — I49.5 SSS (SICK SINUS SYNDROME): ICD-10-CM

## 2022-08-18 DIAGNOSIS — Z95.0 CARDIAC PACEMAKER IN SITU: ICD-10-CM

## 2022-08-18 PROCEDURE — 93280 CARDIAC DEVICE CHECK - IN CLINIC & HOSPITAL: ICD-10-PCS | Mod: 26,,, | Performed by: INTERNAL MEDICINE

## 2022-08-18 PROCEDURE — 93280 PM DEVICE PROGR EVAL DUAL: CPT

## 2022-08-18 PROCEDURE — 93280 PM DEVICE PROGR EVAL DUAL: CPT | Mod: 26,,, | Performed by: INTERNAL MEDICINE

## 2022-08-19 ENCOUNTER — TELEPHONE (OUTPATIENT)
Dept: ELECTROPHYSIOLOGY | Facility: CLINIC | Age: 72
End: 2022-08-19
Payer: MEDICARE

## 2022-08-30 ENCOUNTER — PATIENT MESSAGE (OUTPATIENT)
Dept: NEUROSURGERY | Facility: CLINIC | Age: 72
End: 2022-08-30
Payer: MEDICARE

## 2022-09-07 ENCOUNTER — TELEPHONE (OUTPATIENT)
Dept: NEUROSURGERY | Facility: CLINIC | Age: 72
End: 2022-09-07
Payer: MEDICARE

## 2022-09-07 DIAGNOSIS — M47.12 CERVICAL SPONDYLOSIS WITH MYELOPATHY: Primary | ICD-10-CM

## 2022-09-16 ENCOUNTER — PATIENT MESSAGE (OUTPATIENT)
Dept: CARDIOLOGY | Facility: CLINIC | Age: 72
End: 2022-09-16
Payer: MEDICARE

## 2022-09-16 ENCOUNTER — PATIENT MESSAGE (OUTPATIENT)
Dept: SURGERY | Facility: HOSPITAL | Age: 72
End: 2022-09-16
Payer: MEDICARE

## 2022-09-18 ENCOUNTER — HOSPITAL ENCOUNTER (EMERGENCY)
Facility: HOSPITAL | Age: 72
Discharge: HOME | End: 2022-09-18
Attending: EMERGENCY MEDICINE
Payer: MEDICARE

## 2022-09-18 VITALS
DIASTOLIC BLOOD PRESSURE: 95 MMHG | TEMPERATURE: 98 F | HEIGHT: 68 IN | OXYGEN SATURATION: 100 % | SYSTOLIC BLOOD PRESSURE: 125 MMHG | HEART RATE: 61 BPM | BODY MASS INDEX: 24.25 KG/M2 | WEIGHT: 160 LBS | RESPIRATION RATE: 20 BRPM

## 2022-09-18 DIAGNOSIS — R55 SYNCOPE: ICD-10-CM

## 2022-09-18 DIAGNOSIS — S02.2XXB OPEN FRACTURE OF NASAL BONE, INITIAL ENCOUNTER: Primary | ICD-10-CM

## 2022-09-18 DIAGNOSIS — S52.501A CLOSED FRACTURE OF DISTAL END OF RIGHT RADIUS, UNSPECIFIED FRACTURE MORPHOLOGY, INITIAL ENCOUNTER: ICD-10-CM

## 2022-09-18 LAB
ALBUMIN SERPL BCP-MCNC: 3.5 G/DL (ref 3.5–5.2)
ALP SERPL-CCNC: 93 U/L (ref 55–135)
ALT SERPL W/O P-5'-P-CCNC: 127 U/L (ref 10–44)
ANION GAP SERPL CALC-SCNC: 8 MMOL/L (ref 8–16)
AST SERPL-CCNC: 119 U/L (ref 10–40)
BASOPHILS # BLD AUTO: 0.03 K/UL (ref 0–0.2)
BASOPHILS NFR BLD: 0.3 % (ref 0–1.9)
BILIRUB SERPL-MCNC: 1 MG/DL (ref 0.1–1)
BILIRUB UR QL STRIP: NEGATIVE
BUN SERPL-MCNC: 23 MG/DL (ref 8–23)
CALCIUM SERPL-MCNC: 9.6 MG/DL (ref 8.7–10.5)
CHLORIDE SERPL-SCNC: 105 MMOL/L (ref 95–110)
CLARITY UR REFRACT.AUTO: CLEAR
CO2 SERPL-SCNC: 22 MMOL/L (ref 23–29)
COLOR UR AUTO: COLORLESS
CREAT SERPL-MCNC: 1 MG/DL (ref 0.5–1.4)
DIFFERENTIAL METHOD: ABNORMAL
EOSINOPHIL # BLD AUTO: 0.3 K/UL (ref 0–0.5)
EOSINOPHIL NFR BLD: 2.8 % (ref 0–8)
ERYTHROCYTE [DISTWIDTH] IN BLOOD BY AUTOMATED COUNT: 12.9 % (ref 11.5–14.5)
EST. GFR  (NO RACE VARIABLE): 59.1 ML/MIN/1.73 M^2
GLUCOSE SERPL-MCNC: 91 MG/DL (ref 70–110)
GLUCOSE UR QL STRIP: NEGATIVE
HCT VFR BLD AUTO: 32.5 % (ref 37–48.5)
HCV AB SERPL QL IA: NORMAL
HGB BLD-MCNC: 11.2 G/DL (ref 12–16)
HGB UR QL STRIP: NEGATIVE
HIV 1+2 AB+HIV1 P24 AG SERPL QL IA: NORMAL
IMM GRANULOCYTES # BLD AUTO: 0.05 K/UL (ref 0–0.04)
IMM GRANULOCYTES NFR BLD AUTO: 0.5 % (ref 0–0.5)
KETONES UR QL STRIP: NEGATIVE
LEUKOCYTE ESTERASE UR QL STRIP: NEGATIVE
LYMPHOCYTES # BLD AUTO: 1.9 K/UL (ref 1–4.8)
LYMPHOCYTES NFR BLD: 17.9 % (ref 18–48)
MAGNESIUM SERPL-MCNC: 2 MG/DL (ref 1.6–2.6)
MCH RBC QN AUTO: 31 PG (ref 27–31)
MCHC RBC AUTO-ENTMCNC: 34.5 G/DL (ref 32–36)
MCV RBC AUTO: 90 FL (ref 82–98)
MONOCYTES # BLD AUTO: 1.1 K/UL (ref 0.3–1)
MONOCYTES NFR BLD: 10.4 % (ref 4–15)
NEUTROPHILS # BLD AUTO: 7.2 K/UL (ref 1.8–7.7)
NEUTROPHILS NFR BLD: 68.1 % (ref 38–73)
NITRITE UR QL STRIP: NEGATIVE
NRBC BLD-RTO: 0 /100 WBC
PH UR STRIP: 6 [PH] (ref 5–8)
PLATELET # BLD AUTO: 145 K/UL (ref 150–450)
PMV BLD AUTO: 10.4 FL (ref 9.2–12.9)
POTASSIUM SERPL-SCNC: 4 MMOL/L (ref 3.5–5.1)
PROT SERPL-MCNC: 6.8 G/DL (ref 6–8.4)
PROT UR QL STRIP: NEGATIVE
RBC # BLD AUTO: 3.61 M/UL (ref 4–5.4)
SODIUM SERPL-SCNC: 135 MMOL/L (ref 136–145)
SP GR UR STRIP: 1.01 (ref 1–1.03)
TROPONIN I SERPL DL<=0.01 NG/ML-MCNC: <0.006 NG/ML (ref 0–0.03)
URN SPEC COLLECT METH UR: ABNORMAL
WBC # BLD AUTO: 10.53 K/UL (ref 3.9–12.7)

## 2022-09-18 PROCEDURE — 12011 RPR F/E/E/N/L/M 2.5 CM/<: CPT

## 2022-09-18 PROCEDURE — 81003 URINALYSIS AUTO W/O SCOPE: CPT | Performed by: STUDENT IN AN ORGANIZED HEALTH CARE EDUCATION/TRAINING PROGRAM

## 2022-09-18 PROCEDURE — 99285 PR EMERGENCY DEPT VISIT,LEVEL V: ICD-10-PCS | Mod: 25,,, | Performed by: EMERGENCY MEDICINE

## 2022-09-18 PROCEDURE — 85025 COMPLETE CBC W/AUTO DIFF WBC: CPT | Performed by: STUDENT IN AN ORGANIZED HEALTH CARE EDUCATION/TRAINING PROGRAM

## 2022-09-18 PROCEDURE — 96374 THER/PROPH/DIAG INJ IV PUSH: CPT | Mod: 59

## 2022-09-18 PROCEDURE — 25000003 PHARM REV CODE 250: Performed by: STUDENT IN AN ORGANIZED HEALTH CARE EDUCATION/TRAINING PROGRAM

## 2022-09-18 PROCEDURE — 83735 ASSAY OF MAGNESIUM: CPT | Performed by: STUDENT IN AN ORGANIZED HEALTH CARE EDUCATION/TRAINING PROGRAM

## 2022-09-18 PROCEDURE — 12011 RPR F/E/E/N/L/M 2.5 CM/<: CPT | Mod: ,,, | Performed by: EMERGENCY MEDICINE

## 2022-09-18 PROCEDURE — 93005 ELECTROCARDIOGRAM TRACING: CPT

## 2022-09-18 PROCEDURE — 63600175 PHARM REV CODE 636 W HCPCS: Performed by: STUDENT IN AN ORGANIZED HEALTH CARE EDUCATION/TRAINING PROGRAM

## 2022-09-18 PROCEDURE — 86803 HEPATITIS C AB TEST: CPT | Performed by: PHYSICIAN ASSISTANT

## 2022-09-18 PROCEDURE — 12011 PR RESUPERF WND FACE <2.5 CM: ICD-10-PCS | Mod: ,,, | Performed by: EMERGENCY MEDICINE

## 2022-09-18 PROCEDURE — 93010 EKG 12-LEAD: ICD-10-PCS | Mod: ,,, | Performed by: INTERNAL MEDICINE

## 2022-09-18 PROCEDURE — 96372 THER/PROPH/DIAG INJ SC/IM: CPT | Performed by: STUDENT IN AN ORGANIZED HEALTH CARE EDUCATION/TRAINING PROGRAM

## 2022-09-18 PROCEDURE — 93010 ELECTROCARDIOGRAM REPORT: CPT | Mod: ,,, | Performed by: INTERNAL MEDICINE

## 2022-09-18 PROCEDURE — 87389 HIV-1 AG W/HIV-1&-2 AB AG IA: CPT | Performed by: PHYSICIAN ASSISTANT

## 2022-09-18 PROCEDURE — 80053 COMPREHEN METABOLIC PANEL: CPT | Performed by: STUDENT IN AN ORGANIZED HEALTH CARE EDUCATION/TRAINING PROGRAM

## 2022-09-18 PROCEDURE — 99285 EMERGENCY DEPT VISIT HI MDM: CPT | Mod: 25,,, | Performed by: EMERGENCY MEDICINE

## 2022-09-18 PROCEDURE — 84484 ASSAY OF TROPONIN QUANT: CPT | Performed by: STUDENT IN AN ORGANIZED HEALTH CARE EDUCATION/TRAINING PROGRAM

## 2022-09-18 PROCEDURE — 99285 EMERGENCY DEPT VISIT HI MDM: CPT | Mod: 25

## 2022-09-18 RX ORDER — MORPHINE SULFATE 2 MG/ML
2 INJECTION, SOLUTION INTRAMUSCULAR; INTRAVENOUS
Status: COMPLETED | OUTPATIENT
Start: 2022-09-18 | End: 2022-09-18

## 2022-09-18 RX ORDER — OXYMETAZOLINE HCL 0.05 %
1 SPRAY, NON-AEROSOL (ML) NASAL
Status: COMPLETED | OUTPATIENT
Start: 2022-09-18 | End: 2022-09-18

## 2022-09-18 RX ORDER — LIDOCAINE HYDROCHLORIDE 10 MG/ML
5 INJECTION, SOLUTION EPIDURAL; INFILTRATION; INTRACAUDAL; PERINEURAL
Status: COMPLETED | OUTPATIENT
Start: 2022-09-18 | End: 2022-09-18

## 2022-09-18 RX ORDER — OXYCODONE HYDROCHLORIDE 5 MG/1
5 TABLET ORAL EVERY 4 HOURS PRN
Qty: 6 TABLET | Refills: 0 | Status: SHIPPED | OUTPATIENT
Start: 2022-09-18 | End: 2022-09-23

## 2022-09-18 RX ADMIN — MORPHINE SULFATE 2 MG: 2 INJECTION, SOLUTION INTRAMUSCULAR; INTRAVENOUS at 11:09

## 2022-09-18 RX ADMIN — LIDOCAINE HYDROCHLORIDE 50 MG: 10 INJECTION, SOLUTION EPIDURAL; INFILTRATION; INTRACAUDAL at 07:09

## 2022-09-18 RX ADMIN — MORPHINE SULFATE 2 MG: 2 INJECTION, SOLUTION INTRAMUSCULAR; INTRAVENOUS at 05:09

## 2022-09-18 RX ADMIN — OXYMETAZOLINE HCL 1 SPRAY: 0.05 SPRAY NASAL at 11:09

## 2022-09-19 ENCOUNTER — PATIENT MESSAGE (OUTPATIENT)
Dept: SURGERY | Facility: HOSPITAL | Age: 72
End: 2022-09-19
Payer: MEDICARE

## 2022-09-19 NOTE — ED PROVIDER NOTES
Encounter Date: 9/18/2022       History     Chief Complaint   Patient presents with    Fall     Mechanical fall outside. Multiple lacs noted to face. Swelling and bruising noted to face.      72 year old female with history of sick sinus syndrome status post pacemaker, hypertension, hyperlipidemia, multiple falls presenting to the ED after fall.  She was waiting for a patient, and foot got stuck had a fall.  Positive head trauma.  Unknown LOC. No vomiting. Pt had a lapse of time/what happened to her afterwards. She has been having lots of balance issues recently, follows with neurosurgery for it. Not on any anticoagulation. Has some nose pain. Able to ambulate afterwards.      Review of patient's allergies indicates:  No Known Allergies  Past Medical History:   Diagnosis Date    Abnormal Pap smear     Atrial fibrillation     AV block, 1st degree 07/25/2012    C. difficile diarrhea     RESOLVED    Cataract     Depression 07/24/2012    Facet arthritis of lumbar region 03/31/2015    Falls     3 falls in the last 6 mos--noted 6/19/19    Hyperlipidemia     Hypertension 07/24/2012    Neuropathy     Other specified cardiac dysrhythmias(427.89)     Sleep apnea     Syncope 07/24/2012     Past Surgical History:   Procedure Laterality Date    APPLICATION OF CARTILAGE GRAFT Bilateral 12/19/2019    Procedure: APPLICATION, CARTILAGE GRAFT AURICULAR JEZ;  Surgeon: Martinez Flores III, MD;  Location: [facility], [place], operated by [facility] OR;  Service: ENT;  Laterality: Bilateral;    CARDIAC PACEMAKER PLACEMENT  09/07/2012    [id number]    CATARACT EXTRACTION W/  INTRAOCULAR LENS IMPLANT Right 5/16/2022    Procedure: EXTRACTION, CATARACT, WITH IOL INSERTION;  Surgeon: Ines Aguilera MD;  Location: [facility], [place], operated by [facility] OR;  Service: Ophthalmology;  Laterality: Right;  Catalys     CATARACT EXTRACTION W/  INTRAOCULAR LENS IMPLANT Left 6/20/2022    Procedure: EXTRACTION, CATARACT, WITH IOL INSERTION;  Surgeon: Ines Aguilera MD;  Location: [facility], [place], operated by [facility] OR;  Service:  Ophthalmology;  Laterality: Left;  Catalys     DILATION AND CURETTAGE OF UTERUS      Hx of precancerous cells?    ENDOSCOPIC ULTRASOUND OF UPPER GASTROINTESTINAL TRACT N/A 2019    Procedure: ULTRASOUND, UPPER GI TRACT, ENDOSCOPIC;  Surgeon: Kev Calderon MD;  Location: [facility] (2ND FLR);  Service: Endoscopy;  Laterality: N/A;  Pacemaker-Adam   appt confirmed-rb    MYELOGRAPHY N/A 2021    Procedure: Myelogram  CERVICAL, THORACIC AND LUMBAR;  [facility];  Location: [facility] OR 2ND FLR;  Service: Radiology;  Laterality: N/A;    NASAL SEPTOPLASTY N/A 2019    Procedure: SEPTOPLASTY, NOSE;  Surgeon: Martinez Flores III, MD;  Location: [facility];  Service: ENT;  Laterality: N/A;  FOLLOW DR SALVATORE KIMBROUGH PROTOCOL    OPEN REDUCTION AND INTERNAL FIXATION (ORIF) OF FRACTURE OF DISTAL RADIUS Left 2020    Procedure: ORIF, FRACTURE, RADIUS, DISTAL-left;  Surgeon: Ellen Moe MD;  Location: [facility] OR;  Service: Orthopedics;  Laterality: Left;    SURGICAL REMOVAL OF NASAL TURBINATE Bilateral 2019    Procedure: EXCISION, NASAL TURBINATE;  Surgeon: Martinez Flores III, MD;  Location: [facility];  Service: ENT;  Laterality: Bilateral;    TONSILLECTOMY      WISDOM TOOTH EXTRACTION       Family History   Adopted: Yes   Problem Relation Age of Onset    Amblyopia Neg Hx     Blindness Neg Hx     Cataracts Neg Hx     Glaucoma Neg Hx     Macular degeneration Neg Hx     Retinal detachment Neg Hx      Social History     Tobacco Use    Smoking status: Former     Packs/day: 0.25     Years: 15.00     Pack years: 3.75     Types: Cigarettes     Quit date: 1982     Years since quittin.2    Smokeless tobacco: Former   Substance Use Topics    Alcohol use: Yes     Comment: no daily or heavy use, ~4 times per month    Drug use: No     Review of Systems   Constitutional:  Negative for fever.   HENT:  Positive for nosebleeds. Negative for congestion and sore throat.    Respiratory:  Negative for  shortness of breath.    Cardiovascular:  Negative for chest pain.   Gastrointestinal:  Negative for abdominal pain, diarrhea, nausea and vomiting.   Genitourinary:  Negative for dysuria.   Musculoskeletal:  Negative for back pain.   Skin:  Negative for rash.   Neurological:  Negative for dizziness, weakness, light-headedness and headaches.   Hematological:  Does not bruise/bleed easily.     Physical Exam     Initial Vitals [09/18/22 1656]   BP Pulse Resp Temp SpO2   132/61 60 18 98.2 °F (36.8 °C) 100 %      MAP       --         Physical Exam    Nursing note and vitals reviewed.  Constitutional: She appears well-developed and well-nourished. She is not diaphoretic. No distress.   HENT:   Head: Normocephalic.   Multiple bruises noted. Dried blood on face, 1.5cm linear laceration at the bridge of nose. No periorbital ecchymosis. Negative velázquez sign   Eyes: Conjunctivae and EOM are normal. Right eye exhibits no discharge. Left eye exhibits no discharge. No scleral icterus.   Neck:   No c-spine midline TTP     Cardiovascular:  Normal rate and regular rhythm.     Exam reveals no gallop and no friction rub.       No murmur heard.  Pulmonary/Chest: No respiratory distress. She has no wheezes. She has no rhonchi. She has no rales. She exhibits no tenderness.   Abdominal: Abdomen is soft. She exhibits no distension and no mass. There is no abdominal tenderness. There is no rebound and no guarding.     Neurological: She is alert and oriented to person, place, and time. She has normal strength. No cranial nerve deficit or sensory deficit.   5/5 strength in BUE and BLE   Skin: Skin is warm and dry. No erythema. No pallor.       ED Course   Lac Repair    Date/Time: 9/18/2022 7:17 PM  Performed by: Hu Wong MD  Authorized by: Frank Franco MD     Consent:     Consent obtained:  Verbal    Consent given by:  Patient    Risks, benefits, and alternatives were discussed: yes      Risks discussed:  Infection and  pain  Laceration details:     Location:  Face    Face location:  Nose    Length (cm):  1.5    Depth (mm):  2  Pre-procedure details:     Preparation:  Patient was prepped and draped in usual sterile fashion and imaging obtained to evaluate for foreign bodies  Treatment:     Area cleansed with:  Soap and water    Amount of cleaning:  Standard    Irrigation solution:  Sterile saline    Irrigation volume:  50  Skin repair:     Repair method:  Sutures    Suture size:  5-0    Suture material:  Prolene    Suture technique:  Simple interrupted    Number of sutures:  1  Approximation:     Approximation:  Close  Repair type:     Repair type:  Simple  Post-procedure details:     Dressing:  Open (no dressing)    Procedure completion:  Tolerated well, no immediate complications  Labs Reviewed   CBC W/ AUTO DIFFERENTIAL - Abnormal; Notable for the following components:       Result Value    RBC 3.61 (*)     Hemoglobin 11.2 (*)     Hematocrit 32.5 (*)     Platelets 145 (*)     Immature Grans (Abs) 0.05 (*)     Mono # 1.1 (*)     Lymph % 17.9 (*)     All other components within normal limits   COMPREHENSIVE METABOLIC PANEL - Abnormal; Notable for the following components:    Sodium 135 (*)     CO2 22 (*)      (*)      (*)     eGFR 59.1 (*)     All other components within normal limits   URINALYSIS, REFLEX TO URINE CULTURE - Abnormal; Notable for the following components:    Color, UA Colorless (*)     All other components within normal limits    Narrative:     Specimen Source->Urine   HIV 1 / 2 ANTIBODY    Narrative:     Release to patient->Immediate   HEPATITIS C ANTIBODY    Narrative:     Release to patient->Immediate   TROPONIN I   MAGNESIUM     EKG Readings: (Independently Interpreted)   See ED course   ECG Results              EKG 12-lead (Final result)  Result time 09/19/22 12:23:38      Final result by Interface, Lab In [facility] (09/19/22 12:23:38)                   Narrative:    Test Reason : R55,    Vent.  Rate : 065 BPM     Atrial Rate : 500 BPM     P-R Int : 000 ms          QRS Dur : 136 ms      QT Int : 416 ms       P-R-T Axes : 000 084 051 degrees     QTc Int : 432 ms    Electronic atrial pacemaker wiht prolonged AV conduction  Right bundle branch block  Abnormal ECG  When compared with ECG of 12-OCT-2021 10:16,  No significant change was found  Confirmed by NNAMDI GARDNER MD ([id number]) on 9/19/2022 12:23:32 PM    Referred By: [email or web address]   SELF           Confirmed By:NNAMDI GARDNER MD                                  Imaging Results              X-Ray Hand 3 view Right (Final result)  Result time 09/18/22 18:49:35      Final result by Sumeet Tristan MD (09/18/22 18:49:35)                   Impression:      1. Acute fracture involving the distal aspect of the radius as described above, no acute displaced fracture of the hand.      Electronically signed by: Sumeet Tristan MD  Date:    09/18/2022  Time:    18:49               Narrative:    EXAMINATION:  XR HAND COMPLETE 3 VIEW RIGHT    CLINICAL HISTORY:  fall;    TECHNIQUE:  PA, lateral, and oblique views of the right hand were performed.    COMPARISON:  06/12/2019    FINDINGS:  Three views right hand.    There is osteopenia.  Degenerative changes are noted of the hand.  No acute displaced fracture or dislocation of the hand.  No radiopaque foreign body.  No significant edema.    There is an acute comminuted intra-articular fracture of the distal right radius.  Edema overlies the site.                                       CT Head Without Contrast (Final result)  Result time 09/18/22 19:01:14      Final result by Elder La MD (09/18/22 19:01:14)                   Impression:      1. Mildly displaced acute comminuted fracture of the nasal bone with overlying soft tissue edema.  2. No fracture or traumatic malalignment of the cervical spine.  3. Soft tissue hematoma overlying the left frontal calvarium.  4. No acute intracranial findings.  5. Degenerative changes of  the cervical spine, similar to prior and most pronounced at C5-C6 with moderate spinal canal stenosis and mild-moderate neural foraminal narrowing.    Electronically signed by resident: Pari Loco  Date:    09/18/2022  Time:    18:15    Electronically signed by: Elder La MD  Date:    09/18/2022  Time:    19:01               Narrative:    EXAMINATION:  CT HEAD WITHOUT CONTRAST; CT CERVICAL SPINE WITHOUT CONTRAST; CT MAXILLOFACIAL WITHOUT CONTRAST    CLINICAL HISTORY:  Head trauma, minor (Age >= 65y);; Neck trauma (Age >= 65y);; Facial trauma, blunt;    TECHNIQUE:  Low dose axial images, sagittal and coronal reformations were obtained through the cervical spine face face.  Contrast was not administered.  Axial, sagittal and coronal reconstructions were performed.    Low dose axial CT images obtained throughout the head without the use of intravenous contrast.  Axial, sagittal and coronal reconstructions were performed.    COMPARISON:  CT head without contrast 04/12/2017 and CT cervical spine without contrast 08/16/2022.    FINDINGS:  CT head:    Intracranial compartment:    Mild generalized cerebral volume loss with compensatory dilation of the ventricles and sulci, similar to prior.  No evidence of hydrocephalus.    Stable small hypodensity in the inferior right frontal lobe, likely region of encephalomalacia.  Stable small rounded hypodensity in the right basal ganglia favored to represent prominent perivascular space.  No new parenchymal mass, hemorrhage, edema or major vascular distribution    No extra-axial blood or fluid collections.    Skull/extracranial contents (limited evaluation):    Hematoma overlying the left frontal bone.  Mastoid air cells are essentially clear.  Opacification in the left external auditory canal, likely representing cerumen.    CT maxillofacial:    Mildly displaced comminuted acute fracture of the nasal bone with overlying soft tissue edema.  Bony orbits, zygomatic arches,  pterygoid plates, maxilla and mandible are intact.  No facial bone fracture.  Temporomandibular joints are aligned.    Mild mucosal thickening within the paranasal sinuses.    Optic globes, optic nerves, and extraocular muscles are unremarkable.  No infiltration of retrobulbar fat.    Salivary glands are unremarkable.    CT cervical spine:    Alignment: Normal.    Vertebrae: Diffuse osteopenia.  The dens, occipital condyles and lateral masses are intact.  No fracture of the vertebral bodies.  No traumatic malalignment.  No suspicious lytic or sclerotic lesions.    Discs: Multilevel disc height loss most pronounced at C5-C6.    Degenerative changes: Multilevel degenerative changes, unchanged from prior: Severe neural foraminal narrowing at C4-C5 and mild-moderate at C5-C6 and C6-C7.  Mild spinal canal stenosis at C2-C3, C4-C5, and C6-C7 and moderate spinal canal stenosis at C5-C6.    Soft tissue: Biapical fibronodular scarring. No prevertebral soft tissue edema.                                       CT Maxillofacial Without Contrast (Final result)  Result time 09/18/22 19:01:14      Final result by Elder La MD (09/18/22 19:01:14)                   Impression:      1. Mildly displaced acute comminuted fracture of the nasal bone with overlying soft tissue edema.  2. No fracture or traumatic malalignment of the cervical spine.  3. Soft tissue hematoma overlying the left frontal calvarium.  4. No acute intracranial findings.  5. Degenerative changes of the cervical spine, similar to prior and most pronounced at C5-C6 with moderate spinal canal stenosis and mild-moderate neural foraminal narrowing.    Electronically signed by resident: Pari Loco  Date:    09/18/2022  Time:    18:15    Electronically signed by: Elder La MD  Date:    09/18/2022  Time:    19:01               Narrative:    EXAMINATION:  CT HEAD WITHOUT CONTRAST; CT CERVICAL SPINE WITHOUT CONTRAST; CT MAXILLOFACIAL WITHOUT CONTRAST    CLINICAL  HISTORY:  Head trauma, minor (Age >= 65y);; Neck trauma (Age >= 65y);; Facial trauma, blunt;    TECHNIQUE:  Low dose axial images, sagittal and coronal reformations were obtained through the cervical spine face face.  Contrast was not administered.  Axial, sagittal and coronal reconstructions were performed.    Low dose axial CT images obtained throughout the head without the use of intravenous contrast.  Axial, sagittal and coronal reconstructions were performed.    COMPARISON:  CT head without contrast 04/12/2017 and CT cervical spine without contrast 08/16/2022.    FINDINGS:  CT head:    Intracranial compartment:    Mild generalized cerebral volume loss with compensatory dilation of the ventricles and sulci, similar to prior.  No evidence of hydrocephalus.    Stable small hypodensity in the inferior right frontal lobe, likely region of encephalomalacia.  Stable small rounded hypodensity in the right basal ganglia favored to represent prominent perivascular space.  No new parenchymal mass, hemorrhage, edema or major vascular distribution    No extra-axial blood or fluid collections.    Skull/extracranial contents (limited evaluation):    Hematoma overlying the left frontal bone.  Mastoid air cells are essentially clear.  Opacification in the left external auditory canal, likely representing cerumen.    CT maxillofacial:    Mildly displaced comminuted acute fracture of the nasal bone with overlying soft tissue edema.  Bony orbits, zygomatic arches, pterygoid plates, maxilla and mandible are intact.  No facial bone fracture.  Temporomandibular joints are aligned.    Mild mucosal thickening within the paranasal sinuses.    Optic globes, optic nerves, and extraocular muscles are unremarkable.  No infiltration of retrobulbar fat.    Salivary glands are unremarkable.    CT cervical spine:    Alignment: Normal.    Vertebrae: Diffuse osteopenia.  The dens, occipital condyles and lateral masses are intact.  No fracture of  the vertebral bodies.  No traumatic malalignment.  No suspicious lytic or sclerotic lesions.    Discs: Multilevel disc height loss most pronounced at C5-C6.    Degenerative changes: Multilevel degenerative changes, unchanged from prior: Severe neural foraminal narrowing at C4-C5 and mild-moderate at C5-C6 and C6-C7.  Mild spinal canal stenosis at C2-C3, C4-C5, and C6-C7 and moderate spinal canal stenosis at C5-C6.    Soft tissue: Biapical fibronodular scarring. No prevertebral soft tissue edema.                                       CT Cervical Spine Without Contrast (Final result)  Result time 09/18/22 19:01:14      Final result by Elder La MD (09/18/22 19:01:14)                   Impression:      1. Mildly displaced acute comminuted fracture of the nasal bone with overlying soft tissue edema.  2. No fracture or traumatic malalignment of the cervical spine.  3. Soft tissue hematoma overlying the left frontal calvarium.  4. No acute intracranial findings.  5. Degenerative changes of the cervical spine, similar to prior and most pronounced at C5-C6 with moderate spinal canal stenosis and mild-moderate neural foraminal narrowing.    Electronically signed by resident: Pari Loco  Date:    09/18/2022  Time:    18:15    Electronically signed by: Elder La MD  Date:    09/18/2022  Time:    19:01               Narrative:    EXAMINATION:  CT HEAD WITHOUT CONTRAST; CT CERVICAL SPINE WITHOUT CONTRAST; CT MAXILLOFACIAL WITHOUT CONTRAST    CLINICAL HISTORY:  Head trauma, minor (Age >= 65y);; Neck trauma (Age >= 65y);; Facial trauma, blunt;    TECHNIQUE:  Low dose axial images, sagittal and coronal reformations were obtained through the cervical spine face face.  Contrast was not administered.  Axial, sagittal and coronal reconstructions were performed.    Low dose axial CT images obtained throughout the head without the use of intravenous contrast.  Axial, sagittal and coronal reconstructions were  performed.    COMPARISON:  CT head without contrast 04/12/2017 and CT cervical spine without contrast 08/16/2022.    FINDINGS:  CT head:    Intracranial compartment:    Mild generalized cerebral volume loss with compensatory dilation of the ventricles and sulci, similar to prior.  No evidence of hydrocephalus.    Stable small hypodensity in the inferior right frontal lobe, likely region of encephalomalacia.  Stable small rounded hypodensity in the right basal ganglia favored to represent prominent perivascular space.  No new parenchymal mass, hemorrhage, edema or major vascular distribution    No extra-axial blood or fluid collections.    Skull/extracranial contents (limited evaluation):    Hematoma overlying the left frontal bone.  Mastoid air cells are essentially clear.  Opacification in the left external auditory canal, likely representing cerumen.    CT maxillofacial:    Mildly displaced comminuted acute fracture of the nasal bone with overlying soft tissue edema.  Bony orbits, zygomatic arches, pterygoid plates, maxilla and mandible are intact.  No facial bone fracture.  Temporomandibular joints are aligned.    Mild mucosal thickening within the paranasal sinuses.    Optic globes, optic nerves, and extraocular muscles are unremarkable.  No infiltration of retrobulbar fat.    Salivary glands are unremarkable.    CT cervical spine:    Alignment: Normal.    Vertebrae: Diffuse osteopenia.  The dens, occipital condyles and lateral masses are intact.  No fracture of the vertebral bodies.  No traumatic malalignment.  No suspicious lytic or sclerotic lesions.    Discs: Multilevel disc height loss most pronounced at C5-C6.    Degenerative changes: Multilevel degenerative changes, unchanged from prior: Severe neural foraminal narrowing at C4-C5 and mild-moderate at C5-C6 and C6-C7.  Mild spinal canal stenosis at C2-C3, C4-C5, and C6-C7 and moderate spinal canal stenosis at C5-C6.    Soft tissue: Biapical fibronodular  scarring. No prevertebral soft tissue edema.                                    X-Rays:   Independently Interpreted Readings:   Other Readings:  Nondisplaced distal radius fracture  Medications   morphine injection 2 mg (2 mg Intramuscular Given 9/18/22 1745)   LIDOcaine (PF) 10 mg/ml (1%) injection 50 mg (50 mg Infiltration Given by Other 9/18/22 1917)   oxymetazoline 0.05 % nasal spray 1 spray (1 spray Each Nostril Given 9/18/22 2317)   morphine injection 2 mg (2 mg Intravenous Given 9/18/22 2314)     Medical Decision Making:   History:   Old Medical Records: I decided to obtain old medical records.  Initial Assessment:   72 year old female presenting to the ED with concern for mechanical fall; however, there is a time period for which is unaccounted for concerning for a possible syncopal event. Has laceration on bridge of nose, dried blood throughout.  Mild dried blood noted in left naris.    Differential includes was not limited to internal fall, intracranial bleed, C-spine fracture, electrolyte abnormalities, ACS, arrhythmias, pacemaker discharge    CBC showed no leukocytosis or anemia. CMP showed no electrolyte abnormalities concerning for hospitalization. UA showed no concern for UTI. EKG showed no concern for ischemia; troponin negative, doubt ACS. Laceration of nose repaired. CT Head showed no intracranial hemorrhage. CT C-spine showed no fractures. CT max face showed nasal bone fracture. Given afrin for mild epistaxis. Pacemaker interrogated by cardiology; showed no arrhythmias causing pt's syncopal event. Discharged with referral to ENT    ENT follow-up within 2-3 days was recommended. Given prescription for Oxycodone for breakthrough pain    After taking into careful account the patient's history, physical exam findings, as well as empirical and objective data obtained throughout ED workup, I feel no emergent medical condition has been identified. No further evaluation or admission was felt to be  required, and the patient is stable for discharge from the ED. The patient and any additional family present were updated with test results, overall clinical impression, and recommended further plan of care, including discharge instructions as provided and outpatient follow-up for continued evaluation and management as needed. All questions were answered. The patient expressed understanding and agreed with current plan for discharge and follow-up plan of care. Strict ED return precautions were provided, including return/worsening of current symptoms or any other concerns.        Independently Interpreted Test(s):   I have ordered and independently interpreted EKG Reading(s) - see prior notes  Clinical Tests:   Lab Tests: Ordered and Reviewed  Radiological Study: Ordered and Reviewed  Medical Tests: Ordered and Reviewed          Attending Attestation:   Physician Attestation Statement for Resident:  As the supervising MD   Physician Attestation Statement: I have personally seen and examined this patient.   I agree with the above history.  -:   As the supervising MD I agree with the above PE.     As the supervising MD I agree with the above treatment, course, plan, and disposition.                Attending ED Notes:   STAFF ATTENDING PHYSICIAN NOTE:  I have individually/jointly evaluated Manisha Wynne and discussed their ED management with the resident physician. I have also reviewed their notes, assessments, and procedures documented.  I was present during all critical portions of any procedure(s) performed on Manisha Wynne.   ____________________  Yaakov Franco MD, [facility]  Emergency Medicine Staff      ED Course as of 09/29/22 1223   Sun Sep 18, 2022   2336 EKG 12-lead  Paced rhythm, rate of 65.  No ST elevations or depressions concerning for ischemia.  No STEMI per my independent interpretation. [AU]      ED Course User Index  [AU] Hu Wong MD                   Clinical Impression:    Final diagnoses:  [R55] Syncope  [S02.2XXB] Open fracture of nasal bone, initial encounter (Primary)  [S52.501A] Closed fracture of distal end of right radius, unspecified fracture morphology, initial encounter        ED Disposition Condition    Discharge Stable          ED Prescriptions       Medication Sig Dispense Start Date End Date Auth. Provider    oxyCODONE (ROXICODONE) 5 MG immediate release tablet () Take 1 tablet (5 mg total) by mouth every 4 (four) hours as needed for Pain. 6 tablet 2022 Hu Wong MD          Follow-up Information       Follow up With Specialties Details Why Contact Info    [facility] ENT Otolaryngology In 5 days  [address] [id number]  [phone]    [facility] - Emergency Dept Emergency Medicine  If symptoms worsen [address] [id number] [phone]             Hu Wong MD  Resident  22 [id number]       Frank Franco MD  22 [phone]

## 2022-09-19 NOTE — DISCHARGE INSTRUCTIONS
Please follow-up with the ENT doctors and the orthopedic doctors. Take Ibuprofen or tylenol for pain. If you are having severe worsening pain, take the oxycodone. We thank you for allowing us to assist in your care.

## 2022-09-19 NOTE — ED NOTES
Assumed care of pt; received report from DORETHA Mcclure.     The patient is resting quietly in ED stretcher, and is AAOx4 at this time. Respirations are even and unlabored, with no distress noted. The patient placed on continuous cardiac monitor, automated BP cuff cycling Q30 minutes, and continuous pulse oximeter. The patient is aware of POC and all questions and concerns addressed at this time. The patient was offered restroom assistance and denies need to void. The patient denies further needs and has no complaints at this time. SR raised x2, bed locked and in low position with brake engaged. Call bell within reach and the patient verbalized she would call for assistance if needed. Personal belongings are at bedside within reach. Patient has a visitor at bedside.

## 2022-09-19 NOTE — CARE UPDATE
Called to interrogate ppm after patient with mechanical fall where she got her foot caught and fell face forward. Since ramonita time she has had questionable anterograde amnesia after sustaining facial trauma.     Device interrogation:   -no arrhythmias correlating with symptoms   -1 episode of AT lasting 0:06s at 4:45 am 09/18 and not correlating with symptoms  -AAIR mode with base rate 60  -Impedance: A 401, V 426  -Threshold: A 1.00, V 0.25  -Sensitivity: 0.6mv  -Battery at end of life wit <3 months remaining, she and her EP doctor, Dr. Sheets, are aware of this and will be planning a generator change in the near future

## 2022-09-20 ENCOUNTER — TELEPHONE (OUTPATIENT)
Dept: ORTHOPEDICS | Facility: CLINIC | Age: 72
End: 2022-09-20
Payer: MEDICARE

## 2022-09-20 ENCOUNTER — TELEPHONE (OUTPATIENT)
Dept: OTOLARYNGOLOGY | Facility: CLINIC | Age: 72
End: 2022-09-20
Payer: MEDICARE

## 2022-09-20 DIAGNOSIS — M25.531 RIGHT WRIST PAIN: Primary | ICD-10-CM

## 2022-09-20 NOTE — TELEPHONE ENCOUNTER
Spoke c pt. Offered and confirmed appt location & time junaid Smith 09/21/22 at 3:00 XR at 2:30 . Pt expressed understanding & was thankful.

## 2022-09-20 NOTE — TELEPHONE ENCOUNTER
I called after reviewing her CT scan and explained since there was nothing emergent that it was best to allow the swelling to go down.  She was ok with keeping her appt for 9/28.  She asked about her nose being dry.  She is using Afrin.  I explained that it will help with the bleeding.  Encouraged to not blow her nose.  Told to apply vaseline with a q tip to help keep the nose moistened but to apply only a small amount and not to go up far in the nose.

## 2022-09-20 NOTE — TELEPHONE ENCOUNTER
----- Message from Jojo Hernandez sent at 9/20/2022  3:45 PM CDT -----  .Type: Patient Call Back    Who called:self    What is the request in detail:pt has an appt for 9/28 but needs to be seen asap because she fractured her nose. She has xray in her chart if needed. Please call to schedule    Can the clinic reply by [facility]?    Would the patient rather a call back or a response via [facility]? call    Best call back number:.[phone] (home)

## 2022-09-21 ENCOUNTER — DOCUMENTATION ONLY (OUTPATIENT)
Dept: ORTHOPEDICS | Facility: CLINIC | Age: 72
End: 2022-09-21
Payer: MEDICARE

## 2022-09-21 ENCOUNTER — HOSPITAL ENCOUNTER (OUTPATIENT)
Dept: RADIOLOGY | Facility: OTHER | Age: 72
Discharge: HOME | End: 2022-09-21
Attending: ORTHOPAEDIC SURGERY
Payer: MEDICARE

## 2022-09-21 DIAGNOSIS — M25.531 RIGHT WRIST PAIN: ICD-10-CM

## 2022-09-21 PROCEDURE — 73110 XR WRIST COMPLETE 3 VIEWS RIGHT: ICD-10-PCS | Mod: 26,RT,, | Performed by: RADIOLOGY

## 2022-09-21 PROCEDURE — 73110 X-RAY EXAM OF WRIST: CPT | Mod: TC,FY,RT

## 2022-09-21 PROCEDURE — 73110 X-RAY EXAM OF WRIST: CPT | Mod: 26,RT,, | Performed by: RADIOLOGY

## 2022-09-21 NOTE — PROGRESS NOTES
Pt presented to appt today and informed us this is workers comp case. She reports she was informed of the workers comp process by her job but has not yet contacted the [facility] med dept. She will need to be seen by [facility] med then will follow up with appropriate hand clinic provider.

## 2022-09-28 ENCOUNTER — OFFICE VISIT (OUTPATIENT)
Dept: OTOLARYNGOLOGY | Facility: CLINIC | Age: 72
End: 2022-09-28
Payer: MEDICARE

## 2022-09-28 ENCOUNTER — OFFICE VISIT (OUTPATIENT)
Dept: URGENT CARE | Facility: CLINIC | Age: 72
End: 2022-09-28
Payer: COMMERCIAL

## 2022-09-28 VITALS — DIASTOLIC BLOOD PRESSURE: 80 MMHG | SYSTOLIC BLOOD PRESSURE: 120 MMHG | TEMPERATURE: 97 F | HEART RATE: 77 BPM

## 2022-09-28 VITALS
DIASTOLIC BLOOD PRESSURE: 73 MMHG | HEART RATE: 72 BPM | OXYGEN SATURATION: 97 % | SYSTOLIC BLOOD PRESSURE: 130 MMHG | HEIGHT: 68 IN | WEIGHT: 156 LBS | BODY MASS INDEX: 23.64 KG/M2

## 2022-09-28 DIAGNOSIS — S02.2XXB OPEN FRACTURE OF NASAL BONE, INITIAL ENCOUNTER: ICD-10-CM

## 2022-09-28 DIAGNOSIS — J34.2 NASAL SEPTAL DEVIATION: Primary | ICD-10-CM

## 2022-09-28 DIAGNOSIS — J34.89 NASAL OBSTRUCTION: ICD-10-CM

## 2022-09-28 DIAGNOSIS — S52.591A OTHER CLOSED FRACTURE OF DISTAL END OF RIGHT RADIUS, INITIAL ENCOUNTER: ICD-10-CM

## 2022-09-28 DIAGNOSIS — Z02.6 ENCOUNTER RELATED TO WORKER'S COMPENSATION CLAIM: Primary | ICD-10-CM

## 2022-09-28 DIAGNOSIS — S02.2XXA CLOSED FRACTURE OF NASAL BONE, INITIAL ENCOUNTER: ICD-10-CM

## 2022-09-28 LAB
CTP QC/QA: YES
POC 10 PANEL DRUG SCREEN: ABNORMAL

## 2022-09-28 PROCEDURE — 1157F ADVNC CARE PLAN IN RCRD: CPT | Mod: CPTII,S$GLB,, | Performed by: OTOLARYNGOLOGY

## 2022-09-28 PROCEDURE — 1160F RVW MEDS BY RX/DR IN RCRD: CPT | Mod: CPTII,S$GLB,, | Performed by: OTOLARYNGOLOGY

## 2022-09-28 PROCEDURE — 99214 PR OFFICE/OUTPT VISIT, EST, LEVL IV, 30-39 MIN: ICD-10-PCS | Mod: S$GLB,,, | Performed by: OTOLARYNGOLOGY

## 2022-09-28 PROCEDURE — 4010F PR ACE/ARB THEARPY RXD/TAKEN: ICD-10-PCS | Mod: CPTII,S$GLB,, | Performed by: OTOLARYNGOLOGY

## 2022-09-28 PROCEDURE — 99214 OFFICE O/P EST MOD 30 MIN: CPT | Mod: S$GLB,,, | Performed by: OTOLARYNGOLOGY

## 2022-09-28 PROCEDURE — 1159F MED LIST DOCD IN RCRD: CPT | Mod: CPTII,S$GLB,, | Performed by: OTOLARYNGOLOGY

## 2022-09-28 PROCEDURE — 80305 DRUG TEST PRSMV DIR OPT OBS: CPT | Mod: S$GLB,,, | Performed by: NURSE PRACTITIONER

## 2022-09-28 PROCEDURE — 99203 OFFICE O/P NEW LOW 30 MIN: CPT | Mod: S$GLB,,, | Performed by: NURSE PRACTITIONER

## 2022-09-28 PROCEDURE — 1157F PR ADVANCE CARE PLAN OR EQUIV PRESENT IN MEDICAL RECORD: ICD-10-PCS | Mod: CPTII,S$GLB,, | Performed by: OTOLARYNGOLOGY

## 2022-09-28 PROCEDURE — 1159F PR MEDICATION LIST DOCUMENTED IN MEDICAL RECORD: ICD-10-PCS | Mod: CPTII,S$GLB,, | Performed by: OTOLARYNGOLOGY

## 2022-09-28 PROCEDURE — 4010F ACE/ARB THERAPY RXD/TAKEN: CPT | Mod: CPTII,S$GLB,, | Performed by: OTOLARYNGOLOGY

## 2022-09-28 PROCEDURE — 3044F HG A1C LEVEL LT 7.0%: CPT | Mod: CPTII,S$GLB,, | Performed by: OTOLARYNGOLOGY

## 2022-09-28 PROCEDURE — 99203 PR OFFICE/OUTPT VISIT, NEW, LEVL III, 30-44 MIN: ICD-10-PCS | Mod: S$GLB,,, | Performed by: NURSE PRACTITIONER

## 2022-09-28 PROCEDURE — 80305 OOH NON-DOT DRUG SCREEN: ICD-10-PCS | Mod: S$GLB,,, | Performed by: NURSE PRACTITIONER

## 2022-09-28 PROCEDURE — 3044F PR MOST RECENT HEMOGLOBIN A1C LEVEL <7.0%: ICD-10-PCS | Mod: CPTII,S$GLB,, | Performed by: OTOLARYNGOLOGY

## 2022-09-28 PROCEDURE — 1160F PR REVIEW ALL MEDS BY PRESCRIBER/CLIN PHARMACIST DOCUMENTED: ICD-10-PCS | Mod: CPTII,S$GLB,, | Performed by: OTOLARYNGOLOGY

## 2022-09-28 NOTE — LETTER
Urgent Care - [address] [address]  Phone: [phone]  Fax: [phone]  [facility]: [phone]    Pt Name: Manisha Wynne  Injury Date: 09/18/2022   Employee ID:  Date of First Treatment: 09/28/2022   Company: [employer] ([employer])      Appointment Time: 12:50 PM Arrived: 1230 pm   Provider: ANNELIESE Anne Time Out:[id number]     Office Treatment:   1. Encounter related to worker's compensation claim    2. Closed fracture of nasal bone, initial encounter    3. Other closed fracture of distal end of right radius, initial encounter          Patient Instructions: Apply ice 24-48 hours then apply heat/warm soaks, Referral to specialist to be scheduled, once authorized      Restrictions: Regular Duty     Return Appointment: NONE  F/U with Specialist    CT

## 2022-09-28 NOTE — PROGRESS NOTES
Subjective:       Patient ID: Manisha Wynne is a 72 y.o. female.    Chief Complaint: Wrist Injury (DOI 9/18/22 - RIGHT WRIST and FACE )    Patient's place of employment - [employer]  Patient's job title - [place]  Date of injury - 9/18/22  Body part injured including left or right - RIGHT WRIST and FACE  Injury Mechanism - Fall  What they were doing when they got hurt - Walking   What they did immediately after - Went to ED  Pain scale right now - 7/10    CT    Ambulatory 72-year-old female Nino at [employer].  Patient had an incident where she fell after syncopal episode on 09/18.  She was seen in the emergency room and diagnosed with a right radial fracture and nasal bone fractures.  She did have an appointment with the ENT today.  But when she tried to see Orthopedics for the radial fracture they told her she had becomes her worker's Comp.  Patient had severe facial swelling post incident now with resolving periorbital ecchymosis.  Right wrist is currently in a splint    Wrist Injury   The incident occurred more than 1 week ago. The incident occurred at work. The injury mechanism was a fall. The pain is present in the right wrist. The quality of the pain is described as aching. The pain does not radiate. The pain is at a severity of 7/10. The pain is moderate. The pain has been Constant since the incident. The symptoms are aggravated by movement, palpation and lifting. She has tried NSAIDs for the symptoms. The treatment provided no relief.     Constitution: Negative.   HENT: Negative.     Cardiovascular: Negative.    Respiratory: Negative.     Gastrointestinal: Negative.    Endocrine: negative.   Genitourinary: Negative.  Negative for frequency and urgency.   Musculoskeletal: Negative.  Positive for pain.   Skin: Negative.    Allergic/Immunologic: Negative.    Neurological: Negative.    Hematologic/Lymphatic: Negative.    Psychiatric/Behavioral: Negative.        Objective:       Physical Exam  Vitals and nursing note reviewed.   Constitutional:       Appearance: She is well-developed.   HENT:      Head: Normocephalic and atraumatic.        Right Ear: External ear normal.      Left Ear: External ear normal.      Nose: Nose normal.   Eyes:      Conjunctiva/sclera: Conjunctivae normal.      Pupils: Pupils are equal, round, and reactive to light.   Cardiovascular:      Rate and Rhythm: Normal rate and regular rhythm.      Heart sounds: Normal heart sounds.   Abdominal:      General: Bowel sounds are normal.      Palpations: Abdomen is soft.   Musculoskeletal:         General: Normal range of motion.      Right wrist: Tenderness present.        Arms:       Cervical back: Normal range of motion and neck supple.   Skin:     General: Skin is warm and dry.      Capillary Refill: Capillary refill takes less than 2 seconds.   Neurological:      Mental Status: She is alert and oriented to person, place, and time.   Psychiatric:         Behavior: Behavior normal.         Thought Content: Thought content normal.         Judgment: Judgment normal.     X-Ray Wrist Complete Right    Result Date: 9/21/2022  EXAMINATION: XR WRIST COMPLETE 3 VIEWS RIGHT CLINICAL HISTORY: Pain in right wrist TECHNIQUE: PA, lateral, and oblique views of the right wrist were performed. COMPARISON: Right hand radiographs 09/18/2022 FINDINGS: Fracture through the distal radius remains nondisplaced.  Intra-articular component identified on dedicated hand radiographs less well visualized today.  I see no additional fractures. Diffuse soft tissue edema.     Please see above. Electronically signed by: Natalia Noriega Date:    09/21/2022 Time:    14:58    X-Ray Hand 3 view Right    Result Date: 9/18/2022  EXAMINATION: XR HAND COMPLETE 3 VIEW RIGHT CLINICAL HISTORY: fall; TECHNIQUE: PA, lateral, and oblique views of the right hand were performed. COMPARISON: 06/12/2019 FINDINGS: Three views right hand. There is osteopenia.  Degenerative  changes are noted of the hand.  No acute displaced fracture or dislocation of the hand.  No radiopaque foreign body.  No significant edema. There is an acute comminuted intra-articular fracture of the distal right radius.  Edema overlies the site.     1. Acute fracture involving the distal aspect of the radius as described above, no acute displaced fracture of the hand. Electronically signed by: Sumeet Tristan MD Date:    09/18/2022 Time:    18:49    CT Head Without Contrast    Result Date: 9/18/2022  EXAMINATION: CT HEAD WITHOUT CONTRAST; CT CERVICAL SPINE WITHOUT CONTRAST; CT MAXILLOFACIAL WITHOUT CONTRAST CLINICAL HISTORY: Head trauma, minor (Age >= 65y);; Neck trauma (Age >= 65y);; Facial trauma, blunt; TECHNIQUE: Low dose axial images, sagittal and coronal reformations were obtained through the cervical spine face face.  Contrast was not administered.  Axial, sagittal and coronal reconstructions were performed. Low dose axial CT images obtained throughout the head without the use of intravenous contrast.  Axial, sagittal and coronal reconstructions were performed. COMPARISON: CT head without contrast 04/12/2017 and CT cervical spine without contrast 08/16/2022. FINDINGS: CT head: Intracranial compartment: Mild generalized cerebral volume loss with compensatory dilation of the ventricles and sulci, similar to prior.  No evidence of hydrocephalus. Stable small hypodensity in the inferior right frontal lobe, likely region of encephalomalacia.  Stable small rounded hypodensity in the right basal ganglia favored to represent prominent perivascular space.  No new parenchymal mass, hemorrhage, edema or major vascular distribution No extra-axial blood or fluid collections. Skull/extracranial contents (limited evaluation): Hematoma overlying the left frontal bone.  Mastoid air cells are essentially clear.  Opacification in the left external auditory canal, likely representing cerumen. CT maxillofacial: Mildly displaced  comminuted acute fracture of the nasal bone with overlying soft tissue edema.  Bony orbits, zygomatic arches, pterygoid plates, maxilla and mandible are intact.  No facial bone fracture.  Temporomandibular joints are aligned. Mild mucosal thickening within the paranasal sinuses. Optic globes, optic nerves, and extraocular muscles are unremarkable.  No infiltration of retrobulbar fat. Salivary glands are unremarkable. CT cervical spine: Alignment: Normal. Vertebrae: Diffuse osteopenia.  The dens, occipital condyles and lateral masses are intact.  No fracture of the vertebral bodies.  No traumatic malalignment.  No suspicious lytic or sclerotic lesions. Discs: Multilevel disc height loss most pronounced at C5-C6. Degenerative changes: Multilevel degenerative changes, unchanged from prior: Severe neural foraminal narrowing at C4-C5 and mild-moderate at C5-C6 and C6-C7.  Mild spinal canal stenosis at C2-C3, C4-C5, and C6-C7 and moderate spinal canal stenosis at C5-C6. Soft tissue: Biapical fibronodular scarring. No prevertebral soft tissue edema.     1. Mildly displaced acute comminuted fracture of the nasal bone with overlying soft tissue edema. 2. No fracture or traumatic malalignment of the cervical spine. 3. Soft tissue hematoma overlying the left frontal calvarium. 4. No acute intracranial findings. 5. Degenerative changes of the cervical spine, similar to prior and most pronounced at C5-C6 with moderate spinal canal stenosis and mild-moderate neural foraminal narrowing. Electronically signed by resident: Pari Loco Date:    09/18/2022 Time:    18:15 Electronically signed by: Elder La MD Date:    09/18/2022 Time:    19:01    CT Cervical Spine Without Contrast    Result Date: 9/18/2022  EXAMINATION: CT HEAD WITHOUT CONTRAST; CT CERVICAL SPINE WITHOUT CONTRAST; CT MAXILLOFACIAL WITHOUT CONTRAST CLINICAL HISTORY: Head trauma, minor (Age >= 65y);; Neck trauma (Age >= 65y);; Facial trauma, blunt; TECHNIQUE: Low  dose axial images, sagittal and coronal reformations were obtained through the cervical spine face face.  Contrast was not administered.  Axial, sagittal and coronal reconstructions were performed. Low dose axial CT images obtained throughout the head without the use of intravenous contrast.  Axial, sagittal and coronal reconstructions were performed. COMPARISON: CT head without contrast 04/12/2017 and CT cervical spine without contrast 08/16/2022. FINDINGS: CT head: Intracranial compartment: Mild generalized cerebral volume loss with compensatory dilation of the ventricles and sulci, similar to prior.  No evidence of hydrocephalus. Stable small hypodensity in the inferior right frontal lobe, likely region of encephalomalacia.  Stable small rounded hypodensity in the right basal ganglia favored to represent prominent perivascular space.  No new parenchymal mass, hemorrhage, edema or major vascular distribution No extra-axial blood or fluid collections. Skull/extracranial contents (limited evaluation): Hematoma overlying the left frontal bone.  Mastoid air cells are essentially clear.  Opacification in the left external auditory canal, likely representing cerumen. CT maxillofacial: Mildly displaced comminuted acute fracture of the nasal bone with overlying soft tissue edema.  Bony orbits, zygomatic arches, pterygoid plates, maxilla and mandible are intact.  No facial bone fracture.  Temporomandibular joints are aligned. Mild mucosal thickening within the paranasal sinuses. Optic globes, optic nerves, and extraocular muscles are unremarkable.  No infiltration of retrobulbar fat. Salivary glands are unremarkable. CT cervical spine: Alignment: Normal. Vertebrae: Diffuse osteopenia.  The dens, occipital condyles and lateral masses are intact.  No fracture of the vertebral bodies.  No traumatic malalignment.  No suspicious lytic or sclerotic lesions. Discs: Multilevel disc height loss most pronounced at C5-C6.  Degenerative changes: Multilevel degenerative changes, unchanged from prior: Severe neural foraminal narrowing at C4-C5 and mild-moderate at C5-C6 and C6-C7.  Mild spinal canal stenosis at C2-C3, C4-C5, and C6-C7 and moderate spinal canal stenosis at C5-C6. Soft tissue: Biapical fibronodular scarring. No prevertebral soft tissue edema.     1. Mildly displaced acute comminuted fracture of the nasal bone with overlying soft tissue edema. 2. No fracture or traumatic malalignment of the cervical spine. 3. Soft tissue hematoma overlying the left frontal calvarium. 4. No acute intracranial findings. 5. Degenerative changes of the cervical spine, similar to prior and most pronounced at C5-C6 with moderate spinal canal stenosis and mild-moderate neural foraminal narrowing. Electronically signed by resident: Pari Loco Date:    09/18/2022 Time:    18:15 Electronically signed by: Elder La MD Date:    09/18/2022 Time:    19:01    CT Maxillofacial Without Contrast    Result Date: 9/18/2022  EXAMINATION: CT HEAD WITHOUT CONTRAST; CT CERVICAL SPINE WITHOUT CONTRAST; CT MAXILLOFACIAL WITHOUT CONTRAST CLINICAL HISTORY: Head trauma, minor (Age >= 65y);; Neck trauma (Age >= 65y);; Facial trauma, blunt; TECHNIQUE: Low dose axial images, sagittal and coronal reformations were obtained through the cervical spine face face.  Contrast was not administered.  Axial, sagittal and coronal reconstructions were performed. Low dose axial CT images obtained throughout the head without the use of intravenous contrast.  Axial, sagittal and coronal reconstructions were performed. COMPARISON: CT head without contrast 04/12/2017 and CT cervical spine without contrast 08/16/2022. FINDINGS: CT head: Intracranial compartment: Mild generalized cerebral volume loss with compensatory dilation of the ventricles and sulci, similar to prior.  No evidence of hydrocephalus. Stable small hypodensity in the inferior right frontal lobe, likely region of  encephalomalacia.  Stable small rounded hypodensity in the right basal ganglia favored to represent prominent perivascular space.  No new parenchymal mass, hemorrhage, edema or major vascular distribution No extra-axial blood or fluid collections. Skull/extracranial contents (limited evaluation): Hematoma overlying the left frontal bone.  Mastoid air cells are essentially clear.  Opacification in the left external auditory canal, likely representing cerumen. CT maxillofacial: Mildly displaced comminuted acute fracture of the nasal bone with overlying soft tissue edema.  Bony orbits, zygomatic arches, pterygoid plates, maxilla and mandible are intact.  No facial bone fracture.  Temporomandibular joints are aligned. Mild mucosal thickening within the paranasal sinuses. Optic globes, optic nerves, and extraocular muscles are unremarkable.  No infiltration of retrobulbar fat. Salivary glands are unremarkable. CT cervical spine: Alignment: Normal. Vertebrae: Diffuse osteopenia.  The dens, occipital condyles and lateral masses are intact.  No fracture of the vertebral bodies.  No traumatic malalignment.  No suspicious lytic or sclerotic lesions. Discs: Multilevel disc height loss most pronounced at C5-C6. Degenerative changes: Multilevel degenerative changes, unchanged from prior: Severe neural foraminal narrowing at C4-C5 and mild-moderate at C5-C6 and C6-C7.  Mild spinal canal stenosis at C2-C3, C4-C5, and C6-C7 and moderate spinal canal stenosis at C5-C6. Soft tissue: Biapical fibronodular scarring. No prevertebral soft tissue edema.     1. Mildly displaced acute comminuted fracture of the nasal bone with overlying soft tissue edema. 2. No fracture or traumatic malalignment of the cervical spine. 3. Soft tissue hematoma overlying the left frontal calvarium. 4. No acute intracranial findings. 5. Degenerative changes of the cervical spine, similar to prior and most pronounced at C5-C6 with moderate spinal canal stenosis  and mild-moderate neural foraminal narrowing. Electronically signed by resident: Pari Loco Date:    09/18/2022 Time:    18:15 Electronically signed by: Elder La MD Date:    09/18/2022 Time:    19:01     Assessment:       1. Encounter related to worker's compensation claim    2. Closed fracture of nasal bone, initial encounter    3. Other closed fracture of distal end of right radius, initial encounter          Plan:            Patient Instructions: Apply ice 24-48 hours then apply heat/warm soaks, Referral to specialist to be scheduled, once authorized   Restrictions: Regular Duty  Follow up for with specialists.

## 2022-09-28 NOTE — PROGRESS NOTES
Almost 3 years S/P nasal reconstruction with bilateral auricular cartilage batten grafts,septo,turbs.  She sustained a fall at [facility] 10 days ago striking her face and nose.  She was seen in the ED and evaluated with a maxillofacial CT and 1 suture placed in a dorsal laceration.  She presents now for follow-up.  She does complain of nasal obstruction.  On exam she has marked septal deviation to the left with 2+ turbinates creating 95% obstruction.  I have removed 1 stitch in the upper nasal dorsum.  Her nasal dorsum is tender and she has bilateral ecchymoses of her cheeks and lower lids but I do not appreciate any step deformities.  Intraorally she has no broken teeth or submucosal hematomas.  She has bilateral cerumen impaction.   Plan: Cont. Saline spray/gel  No nose blowing.  She will return to clinic in 1 month for re-evaluation to allow her tenderness and edema to resolve.

## 2022-09-29 ENCOUNTER — CLINICAL SUPPORT (OUTPATIENT)
Dept: CARDIOLOGY | Facility: HOSPITAL | Age: 72
End: 2022-09-29
Attending: INTERNAL MEDICINE
Payer: MEDICARE

## 2022-09-29 ENCOUNTER — PES CALL (OUTPATIENT)
Dept: ADMINISTRATIVE | Facility: CLINIC | Age: 72
End: 2022-09-29
Payer: MEDICARE

## 2022-09-29 ENCOUNTER — TELEPHONE (OUTPATIENT)
Dept: CARDIOLOGY | Facility: HOSPITAL | Age: 72
End: 2022-09-29
Payer: MEDICARE

## 2022-09-29 DIAGNOSIS — I49.5 SSS (SICK SINUS SYNDROME): ICD-10-CM

## 2022-09-29 DIAGNOSIS — Z95.0 CARDIAC PACEMAKER IN SITU: ICD-10-CM

## 2022-09-29 PROCEDURE — 93280 PM DEVICE PROGR EVAL DUAL: CPT

## 2022-09-29 PROCEDURE — 93280 CARDIAC DEVICE CHECK - IN CLINIC & HOSPITAL: ICD-10-PCS | Mod: 26,,, | Performed by: INTERNAL MEDICINE

## 2022-09-29 PROCEDURE — 93280 PM DEVICE PROGR EVAL DUAL: CPT | Mod: 26,,, | Performed by: INTERNAL MEDICINE

## 2022-09-29 NOTE — TELEPHONE ENCOUNTER
The patients' CIED has reached ELECTIVE REPLACEMENT; please see visit details below    Pacing indication:  SSS    Current Device  and Model: Adam Reply  DC PPM    Date of YAS, if known: unknown, but some time after 9/18/22 (EKG in ER showed atrial pacing)    Is this replacement indicator early or unexpected due to an advisory:  No    Battery Voltage (if available): n/a    Pacemaker Dependent: no    Anticoagulation Status: none    Last EF: 60% in Jan 2019    Model of Leads: RA = 1888TC, RV = 1888TC    Any known lead recalls:  no    Leads MRI compatible:  No    Patient is also scheduled for back surgery 11/14/2022    Device clinic interrogation performed: DC PPM implanted 9/2012 for SSS  Presenting egram demonstrates V pacing, currently operating in VVI backup mode  Last seen in EP clinic 12/2021 by ALPHONSO Miner NP  Hx of sick sinus syndrome, historically RV pacing <1%  Underlying rhythm c/w NSR 60-65 bpm  The device was found in VVI backup @ 70 bpm; this was not evident on her EKG 9/18/22 in the ER, atrial pacing with intact conduction noted  Presented to ED 9/18/22 secondary to a mechanical fall here on campus, has a fractured right wrist, broken nose and extensive periorbital bruising  Fellow check that day does not indicate any arrhythmias related to the fall  Both A and V leads test WNL  RV pacing 88% in backup mode VVI 70  No arrhythmia info available due to RRT, FLORENCIO holters stopped for battery conservation  Anticoagulant: none  Battery Status/Longevity: RRT, date unknown, but occurred after 9/18/22  She is active on the portal and is willing to conduct virtual office visits

## 2022-09-30 ENCOUNTER — TELEPHONE (OUTPATIENT)
Dept: ELECTROPHYSIOLOGY | Facility: CLINIC | Age: 72
End: 2022-09-30
Payer: MEDICARE

## 2022-09-30 DIAGNOSIS — I48.0 PAROXYSMAL ATRIAL FIBRILLATION: ICD-10-CM

## 2022-09-30 DIAGNOSIS — I44.0 AV BLOCK, 1ST DEGREE: ICD-10-CM

## 2022-09-30 DIAGNOSIS — Z45.010 PACEMAKER GENERATOR END OF LIFE: Primary | ICD-10-CM

## 2022-09-30 DIAGNOSIS — I49.5 SSS (SICK SINUS SYNDROME): ICD-10-CM

## 2022-09-30 DIAGNOSIS — I45.10 RBBB: ICD-10-CM

## 2022-09-30 RX ORDER — CEFAZOLIN SODIUM/WATER 2 G/20 ML
2 SYRINGE (ML) INTRAVENOUS
Status: CANCELLED | OUTPATIENT
Start: 2022-09-30

## 2022-09-30 NOTE — TELEPHONE ENCOUNTER
Spoke with patient and scheduled for Dual Ppm Gen change  procedure on 10/7/2022 with Dr Sheets. Procedure details reviewed and advised that pre procedure patient instructions will be sent via  as requested. Advised to call the office for any questions or concerns prior to scheduled procedure. Understanding verbalized.

## 2022-09-30 NOTE — TELEPHONE ENCOUNTER
Called patient to set up virtual visit with Dr. Sheets. Patient agreed to 10/4/22. Appointment scheduled in computer.

## 2022-09-30 NOTE — TELEPHONE ENCOUNTER
----- Message from Ximena Kim RN sent at 9/30/2022 12:12 PM CDT -----  Regarding: virtual visit  Can you please assist with scheduling a virtual visit for this patient on 10/4/2022 with Dr Sheets. She is scheduled for a PPM Gen change on 10/7/2022 and wants a virtual visit prior. Thanks

## 2022-10-02 ENCOUNTER — PATIENT MESSAGE (OUTPATIENT)
Dept: ADMINISTRATIVE | Facility: OTHER | Age: 72
End: 2022-10-02
Payer: MEDICARE

## 2022-10-02 ENCOUNTER — PATIENT MESSAGE (OUTPATIENT)
Dept: ELECTROPHYSIOLOGY | Facility: CLINIC | Age: 72
End: 2022-10-02
Payer: MEDICARE

## 2022-10-02 DIAGNOSIS — R13.19 ESOPHAGEAL DYSPHAGIA: Primary | ICD-10-CM

## 2022-10-03 ENCOUNTER — TELEPHONE (OUTPATIENT)
Dept: URGENT CARE | Facility: CLINIC | Age: 72
End: 2022-10-03
Payer: MEDICARE

## 2022-10-03 NOTE — TELEPHONE ENCOUNTER
Called patient and left a voice message and call back number regarding a follow up appointment with  [facility] for the work related injury. LORE

## 2022-10-04 ENCOUNTER — OFFICE VISIT (OUTPATIENT)
Dept: ELECTROPHYSIOLOGY | Facility: CLINIC | Age: 72
End: 2022-10-04
Payer: MEDICARE

## 2022-10-04 DIAGNOSIS — I49.5 SSS (SICK SINUS SYNDROME): Primary | Chronic | ICD-10-CM

## 2022-10-04 DIAGNOSIS — Z95.0 CARDIAC PACEMAKER IN SITU: ICD-10-CM

## 2022-10-04 PROCEDURE — 4010F PR ACE/ARB THEARPY RXD/TAKEN: ICD-10-PCS | Mod: CPTII,95,, | Performed by: INTERNAL MEDICINE

## 2022-10-04 PROCEDURE — 1157F ADVNC CARE PLAN IN RCRD: CPT | Mod: CPTII,95,, | Performed by: INTERNAL MEDICINE

## 2022-10-04 PROCEDURE — 99499 UNLISTED E&M SERVICE: CPT | Mod: HCNC,95,, | Performed by: INTERNAL MEDICINE

## 2022-10-04 PROCEDURE — 1159F MED LIST DOCD IN RCRD: CPT | Mod: CPTII,95,, | Performed by: INTERNAL MEDICINE

## 2022-10-04 PROCEDURE — 99214 PR OFFICE/OUTPT VISIT, EST, LEVL IV, 30-39 MIN: ICD-10-PCS | Mod: 95,,, | Performed by: INTERNAL MEDICINE

## 2022-10-04 PROCEDURE — 1160F RVW MEDS BY RX/DR IN RCRD: CPT | Mod: CPTII,95,, | Performed by: INTERNAL MEDICINE

## 2022-10-04 PROCEDURE — 1157F PR ADVANCE CARE PLAN OR EQUIV PRESENT IN MEDICAL RECORD: ICD-10-PCS | Mod: CPTII,95,, | Performed by: INTERNAL MEDICINE

## 2022-10-04 PROCEDURE — 1160F PR REVIEW ALL MEDS BY PRESCRIBER/CLIN PHARMACIST DOCUMENTED: ICD-10-PCS | Mod: CPTII,95,, | Performed by: INTERNAL MEDICINE

## 2022-10-04 PROCEDURE — 3044F PR MOST RECENT HEMOGLOBIN A1C LEVEL <7.0%: ICD-10-PCS | Mod: CPTII,95,, | Performed by: INTERNAL MEDICINE

## 2022-10-04 PROCEDURE — 99214 OFFICE O/P EST MOD 30 MIN: CPT | Mod: 95,,, | Performed by: INTERNAL MEDICINE

## 2022-10-04 PROCEDURE — 4010F ACE/ARB THERAPY RXD/TAKEN: CPT | Mod: CPTII,95,, | Performed by: INTERNAL MEDICINE

## 2022-10-04 PROCEDURE — 3044F HG A1C LEVEL LT 7.0%: CPT | Mod: CPTII,95,, | Performed by: INTERNAL MEDICINE

## 2022-10-04 PROCEDURE — 1159F PR MEDICATION LIST DOCUMENTED IN MEDICAL RECORD: ICD-10-PCS | Mod: CPTII,95,, | Performed by: INTERNAL MEDICINE

## 2022-10-04 NOTE — H&P (VIEW-ONLY)
The patient location is: [place], [place]  The chief complaint leading to consultation is: PPM at [facility]    Visit type: audiovisual    Face to Face time with patient: 10 minutes  20 minutes of total time spent on the encounter, which includes face to face time and non-face to face time preparing to see the patient (eg, review of tests), Obtaining and/or reviewing separately obtained history, Documenting clinical information in the electronic or other health record, Independently interpreting results (not separately reported) and communicating results to the patient/family/caregiver, or Care coordination (not separately reported).         Each patient to whom he or she provides medical services by telemedicine is:  (1) informed of the relationship between the physician and patient and the respective role of any other health care provider with respect to management of the patient; and (2) notified that he or she may decline to receive medical services by telemedicine and may withdraw from such care at any time.    Notes:           Subjective:   Patient ID:  Manisha Wynne is a 72 y.o. female who presents for follow-up of Pacemaker Check  .   Primary Care Physician: Iris Blue MD    Prior Hx:  I had the pleasure of seeing Ms. Wynne in our electrophysiology clinic in follow-up for her pacemaker. As you are aware she is a pleasant 72 year-old woman with sick sinus syndrome, syncope and high-grade infrahisian block s/p dcPPM ([facility], 9/2012) by Dr. Whelan, hypertension, and CEDRIC here for follow-up.     She saw Svetlana Miner in clinic 5/2019. She reported she had recently experienced a fall while in [place] for a conference. She dislocated her finger, but head CT was negative. She was feeling fatigued and lost her balance as she stepped off the curb. She did not lose consciousness.  Device interrogation showed no arrhythmic episodes during period of her fall. Her last ECHO 1/2019 noted normal  LVEF.    10/2019: She reports between January and May she had 3 falls. No syncope. She reports she has a sensory neuropathy and is falling with Neurology for this.     10/12/2020: She did have a mechanical fall in late January and broke her wrist. She was in a clinical trial for a balance device at [facility] but this has ended. Ms. Wynne is doing well from a device perspective with stable lead and device function. No RV pacing. No sustained arrhythmia noted. She is on the digital HTN program.  Continues to work with neurology on her balance issues. Otherwise she feels well.     10/2021: Saw Svetlana Miner. Ms. Wynne reports no chest pain with exertion or at rest, palpitations, SOB, SHEPARD, dizziness, or syncope. She is in the [facility] balance program. Has Apple Watch. No recent falls. She was supposed to have spinal surgery. Canceled due to c diff. Got it again and is on oral vanc.     Interim Hx:  Mrs. Wynne returns for virtual follow-up. PPM recently reached [facility]. She is scheduled for PPM generator change this week. She is concerned regarding c diff recurrence after getting antibiotics for the procedure.    Recent in-clinc check noted she was in VVI back-up pacing with underlying sinus rhythm of 60-65 bpm.          Review of Systems   Constitutional: Negative for malaise/fatigue.   Cardiovascular:  Negative for chest pain, dyspnea on exertion, irregular heartbeat, leg swelling and palpitations.   Respiratory:  Negative for shortness of breath.    Hematologic/Lymphatic: Negative for bleeding problem.   Skin:  Negative for rash.   Musculoskeletal:  Positive for falls. Negative for myalgias.   Gastrointestinal:  Negative for hematemesis, hematochezia and nausea.   Genitourinary:  Negative for hematuria.   Neurological:  Positive for paresthesias. Negative for light-headedness.   Psychiatric/Behavioral:  Negative for altered mental status.    Allergic/Immunologic: Negative for persistent infections.   Objective:         Physical Exam  Constitutional:       Appearance: Normal appearance.   Neurological:      Mental Status: She is alert and oriented to person, place, and time. Mental status is at baseline.   Psychiatric:         Mood and Affect: Mood normal.         Behavior: Behavior normal.         Thought Content: Thought content normal.         Judgment: Judgment normal.         Assessment:     1. SSS (sick sinus syndrome)    2. Cardiac pacemaker in situ      Plan:     In summary, Ms. Wynne is a pleasant 72 year-old female sick sinus syndrome, syncope and high-grade infrahisian block s/p dcPPM ([facility], 9/2012), hypertension, and CEDRIC here for follow-up. Device is at [facility]. She is already scheduled for PPM generator change. Discussed risks and benefits. She agrees to proceed. She is having spinal surgery in November. Message sent to her ID caregivers regarding her concern of c diff recurrence post-generator change/antibiotic administration.    Thank you for allowing me to participate in the care of this patient. Please do not hesitate to call me with any questions or concerns.    John Sheets MD, PhD  Cardiac Electrophysiology

## 2022-10-04 NOTE — PROGRESS NOTES
The patient location is: [place], [place]  The chief complaint leading to consultation is: PPM at [facility]    Visit type: audiovisual    Face to Face time with patient: 10 minutes  20 minutes of total time spent on the encounter, which includes face to face time and non-face to face time preparing to see the patient (eg, review of tests), Obtaining and/or reviewing separately obtained history, Documenting clinical information in the electronic or other health record, Independently interpreting results (not separately reported) and communicating results to the patient/family/caregiver, or Care coordination (not separately reported).         Each patient to whom he or she provides medical services by telemedicine is:  (1) informed of the relationship between the physician and patient and the respective role of any other health care provider with respect to management of the patient; and (2) notified that he or she may decline to receive medical services by telemedicine and may withdraw from such care at any time.    Notes:           Subjective:   Patient ID:  Manisha Wynne is a 72 y.o. female who presents for follow-up of Pacemaker Check  .   Primary Care Physician: Iris Blue MD    Prior Hx:  I had the pleasure of seeing Ms. Wynne in our electrophysiology clinic in follow-up for her pacemaker. As you are aware she is a pleasant 72 year-old woman with sick sinus syndrome, syncope and high-grade infrahisian block s/p dcPPM ([facility], 9/2012) by Dr. Whelan, hypertension, and CEDRIC here for follow-up.     She saw Svetlana Miner in clinic 5/2019. She reported she had recently experienced a fall while in [place] for a conference. She dislocated her finger, but head CT was negative. She was feeling fatigued and lost her balance as she stepped off the curb. She did not lose consciousness.  Device interrogation showed no arrhythmic episodes during period of her fall. Her last ECHO 1/2019 noted normal  LVEF.    10/2019: She reports between January and May she had 3 falls. No syncope. She reports she has a sensory neuropathy and is falling with Neurology for this.     10/12/2020: She did have a mechanical fall in late January and broke her wrist. She was in a clinical trial for a balance device at [facility] but this has ended. Ms. Wynne is doing well from a device perspective with stable lead and device function. No RV pacing. No sustained arrhythmia noted. She is on the digital HTN program.  Continues to work with neurology on her balance issues. Otherwise she feels well.     10/2021: Saw Svetlana Miner. Ms. Wynne reports no chest pain with exertion or at rest, palpitations, SOB, SHEPARD, dizziness, or syncope. She is in the [facility] balance program. Has Apple Watch. No recent falls. She was supposed to have spinal surgery. Canceled due to c diff. Got it again and is on oral vanc.     Interim Hx:  Mrs. Wynne returns for virtual follow-up. PPM recently reached [facility]. She is scheduled for PPM generator change this week. She is concerned regarding c diff recurrence after getting antibiotics for the procedure.    Recent in-clinc check noted she was in VVI back-up pacing with underlying sinus rhythm of 60-65 bpm.          Review of Systems   Constitutional: Negative for malaise/fatigue.   Cardiovascular:  Negative for chest pain, dyspnea on exertion, irregular heartbeat, leg swelling and palpitations.   Respiratory:  Negative for shortness of breath.    Hematologic/Lymphatic: Negative for bleeding problem.   Skin:  Negative for rash.   Musculoskeletal:  Positive for falls. Negative for myalgias.   Gastrointestinal:  Negative for hematemesis, hematochezia and nausea.   Genitourinary:  Negative for hematuria.   Neurological:  Positive for paresthesias. Negative for light-headedness.   Psychiatric/Behavioral:  Negative for altered mental status.    Allergic/Immunologic: Negative for persistent infections.   Objective:         Physical Exam  Constitutional:       Appearance: Normal appearance.   Neurological:      Mental Status: She is alert and oriented to person, place, and time. Mental status is at baseline.   Psychiatric:         Mood and Affect: Mood normal.         Behavior: Behavior normal.         Thought Content: Thought content normal.         Judgment: Judgment normal.         Assessment:     1. SSS (sick sinus syndrome)    2. Cardiac pacemaker in situ      Plan:     In summary, Ms. Wynne is a pleasant 72 year-old female sick sinus syndrome, syncope and high-grade infrahisian block s/p dcPPM ([facility], 9/2012), hypertension, and CEDRIC here for follow-up. Device is at [facility]. She is already scheduled for PPM generator change. Discussed risks and benefits. She agrees to proceed. She is having spinal surgery in November. Message sent to her ID caregivers regarding her concern of c diff recurrence post-generator change/antibiotic administration.    Thank you for allowing me to participate in the care of this patient. Please do not hesitate to call me with any questions or concerns.    John Sheets MD, PhD  Cardiac Electrophysiology

## 2022-10-06 ENCOUNTER — PATIENT MESSAGE (OUTPATIENT)
Dept: INFECTIOUS DISEASES | Facility: CLINIC | Age: 72
End: 2022-10-06
Payer: MEDICARE

## 2022-10-06 ENCOUNTER — TELEPHONE (OUTPATIENT)
Dept: INFECTIOUS DISEASES | Facility: CLINIC | Age: 72
End: 2022-10-06
Payer: MEDICARE

## 2022-10-06 ENCOUNTER — TELEPHONE (OUTPATIENT)
Dept: ELECTROPHYSIOLOGY | Facility: CLINIC | Age: 72
End: 2022-10-06
Payer: MEDICARE

## 2022-10-06 ENCOUNTER — ANESTHESIA EVENT (OUTPATIENT)
Dept: MEDSURG UNIT | Facility: HOSPITAL | Age: 72
End: 2022-10-06
Payer: MEDICARE

## 2022-10-06 DIAGNOSIS — Z86.19 HISTORY OF CLOSTRIDIOIDES DIFFICILE COLITIS: Primary | ICD-10-CM

## 2022-10-06 RX ORDER — VANCOMYCIN HYDROCHLORIDE 125 MG/1
125 CAPSULE ORAL 4 TIMES DAILY
Qty: 20 CAPSULE | Refills: 0 | Status: SHIPPED | OUTPATIENT
Start: 2022-10-07 | End: 2022-10-12

## 2022-10-06 NOTE — TELEPHONE ENCOUNTER
Spoke to Patient.     CONFIRMED procedure arrival time of 5:15 am on 10/7/2022 for Device Gen Change with Dr Sheets.     Reiterated instructions including:  -Directions to check in desk.    -NPO after midnight night prior to procedure.    -High importance of HOLDING Losartan the morning of the procedure.     -Pre-procedure LABS Completed on 9/18/2022 with no alerts noted.   -COVID test N/A . Patient Vaccinated.  -Confirmed absence or presence of implanted device/stimulator N/A.    -Confirmed no fever, cough, or shortness of breath in the past 30 days.    -Confirmed no redness, rash, irritation, or yeast infection to chest area.     -Bathe night prior and morning prior to procedure with Hibiclens solution or an antibacterial soap.    -Reviewed current visitor policy    Patient verbalized understanding of above, denied any further questions. and appreciated the call.

## 2022-10-06 NOTE — TELEPHONE ENCOUNTER
Infectious Disease Follow up:    Ms. Wynne to have pacemaker change on Friday 10/7/22. She has a history of recurrent C.Diff treated with FMT and concerned because of antibiotic use pre and post op. Spoke to Dr. Sheets and Dr. Maradiaga about antibiotic therapy and will prescribe 5 day course of Vancomycin prophylactically for her to take post-op.

## 2022-10-07 ENCOUNTER — ANESTHESIA (OUTPATIENT)
Dept: MEDSURG UNIT | Facility: HOSPITAL | Age: 72
End: 2022-10-07
Payer: MEDICARE

## 2022-10-07 ENCOUNTER — HOSPITAL ENCOUNTER (OUTPATIENT)
Facility: HOSPITAL | Age: 72
Discharge: HOME | End: 2022-10-07
Attending: INTERNAL MEDICINE | Admitting: INTERNAL MEDICINE
Payer: MEDICARE

## 2022-10-07 VITALS
WEIGHT: 156 LBS | DIASTOLIC BLOOD PRESSURE: 58 MMHG | TEMPERATURE: 98 F | OXYGEN SATURATION: 100 % | HEIGHT: 68 IN | SYSTOLIC BLOOD PRESSURE: 132 MMHG | HEART RATE: 60 BPM | BODY MASS INDEX: 23.64 KG/M2 | RESPIRATION RATE: 19 BRPM

## 2022-10-07 DIAGNOSIS — I44.0 AV BLOCK, 1ST DEGREE: ICD-10-CM

## 2022-10-07 DIAGNOSIS — Z01.818 PRE-OP TESTING: Primary | ICD-10-CM

## 2022-10-07 DIAGNOSIS — I45.10 RBBB: ICD-10-CM

## 2022-10-07 DIAGNOSIS — Z95.9 CARDIAC DEVICE IN SITU: ICD-10-CM

## 2022-10-07 DIAGNOSIS — Z95.0 STATUS POST PLACEMENT OF OTHER CARDIAC PACEMAKER: ICD-10-CM

## 2022-10-07 DIAGNOSIS — I49.5 SSS (SICK SINUS SYNDROME): ICD-10-CM

## 2022-10-07 DIAGNOSIS — I49.9 ARRHYTHMIA: ICD-10-CM

## 2022-10-07 DIAGNOSIS — Z45.010 PACEMAKER GENERATOR END OF LIFE: ICD-10-CM

## 2022-10-07 LAB
APTT BLDCRRT: 27 SEC (ref 21–32)
INR PPP: 1.1 (ref 0.8–1.2)
PROTHROMBIN TIME: 11.4 SEC (ref 9–12.5)

## 2022-10-07 PROCEDURE — D9220A PRA ANESTHESIA: ICD-10-PCS | Mod: ANES,,, | Performed by: ANESTHESIOLOGY

## 2022-10-07 PROCEDURE — 25000003 PHARM REV CODE 250: Performed by: STUDENT IN AN ORGANIZED HEALTH CARE EDUCATION/TRAINING PROGRAM

## 2022-10-07 PROCEDURE — 27201423 OPTIME MED/SURG SUP & DEVICES STERILE SUPPLY: Performed by: INTERNAL MEDICINE

## 2022-10-07 PROCEDURE — 33228 PR REMV&REPLC PM GEN DUAL LEAD: ICD-10-PCS | Mod: ,,, | Performed by: INTERNAL MEDICINE

## 2022-10-07 PROCEDURE — 93005 ELECTROCARDIOGRAM TRACING: CPT

## 2022-10-07 PROCEDURE — D9220A PRA ANESTHESIA: Mod: ANES,,, | Performed by: ANESTHESIOLOGY

## 2022-10-07 PROCEDURE — 93010 EKG 12-LEAD: ICD-10-PCS | Mod: ,,, | Performed by: INTERNAL MEDICINE

## 2022-10-07 PROCEDURE — 27800903 OPTIME MED/SURG SUP & DEVICES OTHER IMPLANTS: Performed by: INTERNAL MEDICINE

## 2022-10-07 PROCEDURE — 85730 THROMBOPLASTIN TIME PARTIAL: CPT | Performed by: INTERNAL MEDICINE

## 2022-10-07 PROCEDURE — 33228 REMV&REPLC PM GEN DUAL LEAD: CPT | Performed by: INTERNAL MEDICINE

## 2022-10-07 PROCEDURE — 63600175 PHARM REV CODE 636 W HCPCS: Performed by: INTERNAL MEDICINE

## 2022-10-07 PROCEDURE — D9220A PRA ANESTHESIA: ICD-10-PCS | Mod: CRNA,,, | Performed by: STUDENT IN AN ORGANIZED HEALTH CARE EDUCATION/TRAINING PROGRAM

## 2022-10-07 PROCEDURE — 85610 PROTHROMBIN TIME: CPT | Performed by: INTERNAL MEDICINE

## 2022-10-07 PROCEDURE — 63600175 PHARM REV CODE 636 W HCPCS: Performed by: STUDENT IN AN ORGANIZED HEALTH CARE EDUCATION/TRAINING PROGRAM

## 2022-10-07 PROCEDURE — 93010 ELECTROCARDIOGRAM REPORT: CPT | Mod: ,,, | Performed by: INTERNAL MEDICINE

## 2022-10-07 PROCEDURE — 93010 ELECTROCARDIOGRAM REPORT: CPT | Mod: 76,,, | Performed by: INTERNAL MEDICINE

## 2022-10-07 PROCEDURE — 37000008 HC ANESTHESIA 1ST 15 MINUTES: Performed by: INTERNAL MEDICINE

## 2022-10-07 PROCEDURE — C1785 PMKR, DUAL, RATE-RESP: HCPCS | Performed by: INTERNAL MEDICINE

## 2022-10-07 PROCEDURE — D9220A PRA ANESTHESIA: Mod: CRNA,,, | Performed by: STUDENT IN AN ORGANIZED HEALTH CARE EDUCATION/TRAINING PROGRAM

## 2022-10-07 PROCEDURE — 33228 REMV&REPLC PM GEN DUAL LEAD: CPT | Mod: ,,, | Performed by: INTERNAL MEDICINE

## 2022-10-07 PROCEDURE — 25000003 PHARM REV CODE 250: Performed by: INTERNAL MEDICINE

## 2022-10-07 PROCEDURE — 93005 ELECTROCARDIOGRAM TRACING: CPT | Mod: 59

## 2022-10-07 PROCEDURE — 37000009 HC ANESTHESIA EA ADD 15 MINS: Performed by: INTERNAL MEDICINE

## 2022-10-07 DEVICE — ENVELOPE CMRM6122 ABSORB MED MR
Type: IMPLANTABLE DEVICE | Site: CHEST | Status: FUNCTIONAL
Brand: TYRX™

## 2022-10-07 DEVICE — PULSE GENERATOR
Type: IMPLANTABLE DEVICE | Site: CHEST | Status: FUNCTIONAL
Brand: ASSURITY MRI™

## 2022-10-07 RX ORDER — VANCOMYCIN HYDROCHLORIDE 1 G/20ML
INJECTION, POWDER, LYOPHILIZED, FOR SOLUTION INTRAVENOUS
Status: DISCONTINUED | OUTPATIENT
Start: 2022-10-07 | End: 2022-10-07 | Stop reason: HOSPADM

## 2022-10-07 RX ORDER — ONDANSETRON 2 MG/ML
INJECTION INTRAMUSCULAR; INTRAVENOUS
Status: DISCONTINUED | OUTPATIENT
Start: 2022-10-07 | End: 2022-10-07

## 2022-10-07 RX ORDER — LIDOCAINE HYDROCHLORIDE 20 MG/ML
INJECTION, SOLUTION INFILTRATION; PERINEURAL
Status: DISCONTINUED | OUTPATIENT
Start: 2022-10-07 | End: 2022-10-07 | Stop reason: HOSPADM

## 2022-10-07 RX ORDER — MIDAZOLAM HYDROCHLORIDE 5 MG/ML
INJECTION INTRAMUSCULAR; INTRAVENOUS
Status: DISCONTINUED | OUTPATIENT
Start: 2022-10-07 | End: 2022-10-07

## 2022-10-07 RX ORDER — SODIUM CHLORIDE 0.9 % (FLUSH) 0.9 %
3 SYRINGE (ML) INJECTION
Status: DISCONTINUED | OUTPATIENT
Start: 2022-10-07 | End: 2022-10-07 | Stop reason: HOSPADM

## 2022-10-07 RX ORDER — DEXAMETHASONE SODIUM PHOSPHATE 4 MG/ML
INJECTION, SOLUTION INTRA-ARTICULAR; INTRALESIONAL; INTRAMUSCULAR; INTRAVENOUS; SOFT TISSUE
Status: DISCONTINUED | OUTPATIENT
Start: 2022-10-07 | End: 2022-10-07

## 2022-10-07 RX ORDER — FENTANYL CITRATE 50 UG/ML
INJECTION, SOLUTION INTRAMUSCULAR; INTRAVENOUS
Status: DISCONTINUED | OUTPATIENT
Start: 2022-10-07 | End: 2022-10-07

## 2022-10-07 RX ORDER — VANCOMYCIN HCL IN 5 % DEXTROSE 1G/250ML
1000 PLASTIC BAG, INJECTION (ML) INTRAVENOUS
Status: DISCONTINUED | OUTPATIENT
Start: 2022-10-07 | End: 2022-11-28

## 2022-10-07 RX ORDER — BUPIVACAINE HYDROCHLORIDE 2.5 MG/ML
INJECTION, SOLUTION EPIDURAL; INFILTRATION; INTRACAUDAL
Status: DISCONTINUED | OUTPATIENT
Start: 2022-10-07 | End: 2022-10-07 | Stop reason: HOSPADM

## 2022-10-07 RX ORDER — ACETAMINOPHEN 325 MG/1
650 TABLET ORAL EVERY 4 HOURS PRN
Status: DISCONTINUED | OUTPATIENT
Start: 2022-10-07 | End: 2022-10-07 | Stop reason: HOSPADM

## 2022-10-07 RX ORDER — SODIUM CHLORIDE 0.9 G/100ML
IRRIGANT IRRIGATION
Status: DISCONTINUED | OUTPATIENT
Start: 2022-10-07 | End: 2022-10-07 | Stop reason: HOSPADM

## 2022-10-07 RX ORDER — DOXYCYCLINE 100 MG/1
100 CAPSULE ORAL EVERY 12 HOURS
Qty: 10 CAPSULE | Refills: 0 | Status: SHIPPED | OUTPATIENT
Start: 2022-10-07 | End: 2022-10-12

## 2022-10-07 RX ORDER — PHENYLEPHRINE HCL IN 0.9% NACL 1 MG/10 ML
SYRINGE (ML) INTRAVENOUS
Status: DISCONTINUED | OUTPATIENT
Start: 2022-10-07 | End: 2022-10-07

## 2022-10-07 RX ORDER — PROPOFOL 10 MG/ML
VIAL (ML) INTRAVENOUS CONTINUOUS PRN
Status: DISCONTINUED | OUTPATIENT
Start: 2022-10-07 | End: 2022-10-07

## 2022-10-07 RX ADMIN — Medication 100 MCG: at 08:10

## 2022-10-07 RX ADMIN — PROPOFOL 100 MCG/KG/MIN: 10 INJECTION, EMULSION INTRAVENOUS at 07:10

## 2022-10-07 RX ADMIN — Medication 50 MCG: at 08:10

## 2022-10-07 RX ADMIN — FENTANYL CITRATE 50 MCG: 50 INJECTION, SOLUTION INTRAMUSCULAR; INTRAVENOUS at 09:10

## 2022-10-07 RX ADMIN — SODIUM CHLORIDE 1000 MG: 9 INJECTION, SOLUTION INTRAVENOUS at 07:10

## 2022-10-07 RX ADMIN — DEXAMETHASONE SODIUM PHOSPHATE 4 MG: 4 INJECTION, SOLUTION INTRAMUSCULAR; INTRAVENOUS at 08:10

## 2022-10-07 RX ADMIN — ONDANSETRON 4 MG: 2 INJECTION INTRAMUSCULAR; INTRAVENOUS at 08:10

## 2022-10-07 RX ADMIN — MIDAZOLAM HYDROCHLORIDE 1 MG: 5 INJECTION, SOLUTION INTRAMUSCULAR; INTRAVENOUS at 07:10

## 2022-10-07 RX ADMIN — FENTANYL CITRATE 50 MCG: 50 INJECTION, SOLUTION INTRAMUSCULAR; INTRAVENOUS at 08:10

## 2022-10-07 RX ADMIN — SODIUM CHLORIDE: 9 INJECTION, SOLUTION INTRAVENOUS at 07:10

## 2022-10-07 NOTE — TRANSFER OF CARE
"Anesthesia Transfer of Care Note    Patient: Manisha Wynne    Procedure(s) Performed: Procedure(s) (LRB):  REPLACEMENT, PACEMAKER GENERATOR (Left)    Patient location: PACU    Anesthesia Type: general    Transport from OR: Transported from OR on 6-10 L/min O2 by face mask with adequate spontaneous ventilation    Post pain: adequate analgesia    Post assessment: no apparent anesthetic complications    Post vital signs: stable    Level of consciousness: awake, alert and oriented    Nausea/Vomiting: no nausea/vomiting    Complications: none    Transfer of care protocol was followedComments: BP 94/57 at bedside before leaving PACU      Last vitals:   Visit Vitals  BP (!) 85/53 (BP Location: Right arm, Patient Position: Lying)   Pulse 81   Temp 37.1 °C (98.8 °F) (Temporal)   Resp 16   Ht 5' 8" (1.727 m)   Wt 70.8 kg (156 lb)   LMP 10/01/2003   SpO2 96%   Breastfeeding No   BMI 23.72 kg/m²     "

## 2022-10-07 NOTE — Clinical Note
The site was marked. The chest was prepped. The site was prepped with ChloraPrep. The patient was draped. The patient was positioned supine. The patient was secured using safety straps, with ulnar pads and to an armboard.

## 2022-10-07 NOTE — Clinical Note
The generator was inserted into antibiotic pouch in the pocket and was sutured into the pocket in the the left upper chest.

## 2022-10-07 NOTE — PLAN OF CARE
Patient arrived to room. PIV placed, labs sent. Admit assessment completed. Plan of care discussed with patient. Will monitor. Call light within reach, instructed in use.

## 2022-10-07 NOTE — PROGRESS NOTES
Reviewed discharge material with patient. Verbalized understanding of instructions and given a copy. Also has device and card. Piv removed x 2. Will dress and get ready for discharge.

## 2022-10-07 NOTE — Clinical Note
The physician was paged. Hydroxychloroquine Pregnancy And Lactation Text: This medication has been shown to cause fetal harm but it isn't assigned a Pregnancy Risk Category. There are small amounts excreted in breast milk.

## 2022-10-07 NOTE — Clinical Note
There was an existing generator. The generator was removed. The leads were disconnected. The generator was returned to . The generator was returned due to [facility]/E[facility]

## 2022-10-07 NOTE — Clinical Note
The generator was inserted into pocket in, was inserted into antibiotic pouch in the pocket and was sutured into the pocket in the the left upper chest.

## 2022-10-07 NOTE — ANESTHESIA PREPROCEDURE EVALUATION
10/07/2022  Manisha Wynne is a 72 y.o., female.    [facility]  Anesthesia Pre-Operative Evaluation         Patient Name: Manisha Wynne  YOB: 1950  MRN: [id number]    SUBJECTIVE:     Pre-operative evaluation for Procedure(s) (LRB):  REPLACEMENT, PACEMAKER GENERATOR (Left)     10/07/2022    Manisha Wynne is a 72 y.o. female     Patient now presents for the above procedure(s).      LDA:       Peripheral IV - Single Lumen 10/07/22 0605 20 G Posterior;Right Forearm (Active)   Site Assessment Clean;Dry;Intact;No redness;No swelling 10/07/22 0608   Extremity Assessment Distal to IV No abnormal discoloration;No redness;No swelling;No warmth 10/07/22 0608   Line Status Blood return noted;Flushed;Saline locked 10/07/22 0608   Dressing Status Clean;Dry;Intact 10/07/22 0608   Dressing Intervention First dressing 10/07/22 0608   Number of days: 0       Prev airway:     Drips:       Patient Active Problem List   Diagnosis    Depression    Hypertension    Atrial fibrillation    SSS (sick sinus syndrome)    AV block, 1st degree    Dyslipidemia    Cardiac pacemaker in situ    Abnormal Pap smear of cervix    Lumbar spinal stenosis    Lumbar radiculopathy    Facet arthritis of lumbar region    Sacroiliac joint dysfunction    CEDRIC (obstructive sleep apnea)    RBBB    CKD (chronic kidney disease) stage 3, GFR 30-59 ml/min    Primary hyperparathyroidism    Other osteoporosis without current pathological fracture    Atrophic pancreas    Sensory peripheral neuropathy    Nasal deformity    Closed fracture of left distal radius    Wrist pain, left    Decreased  strength of left hand    Range of motion deficit    Decreased pinch strength    Cerumen debris on tympanic membrane of both ears    Sensorineural hearing loss (SNHL) of both ears     Back pain    Cervical myelopathy    Diarrhea    Recurrent Clostridioides difficile diarrhea    Goiter, nontoxic, multinodular    Mixed stress and urge incontinence    Urinary frequency    Vaginal atrophy    Nocturia more than twice per night    History of chronic constipation    At risk for falls    Lack of coordination    Muscle weakness    Elevated liver enzymes    Nuclear sclerotic cataract of left eye       Review of patient's allergies indicates:  No Known Allergies    Current Inpatient Medications:      No current facility-administered medications on file prior to encounter.     Current Outpatient Medications on File Prior to Encounter   Medication Sig Dispense Refill    buPROPion (WELLBUTRIN XL) 300 MG 24 hr tablet Take 300 mg by mouth once daily.       calcium-vitamin D 600 mg(1,500mg) -200 unit Tab Take 1 tablet by mouth once daily.       cholecalciferol, vitamin D3, 125 mcg (5,000 unit) Tab Take 5,000 Units by mouth once daily.      estradioL (ESTRACE) 0.01 % (0.1 mg/gram) vaginal cream Place 1 g vaginally twice a week. 42.5 g 2    FLUoxetine 40 MG capsule Take 40 mg by mouth once daily.      lisdexamfetamine (VYVANSE) 70 MG capsule Take 1 capsule (70 mg total) by mouth every morning. 30 capsule 0    losartan (COZAAR) 25 MG tablet TAKE 1 TABLET EVERY DAY 90 tablet 6    metoprolol tartrate (LOPRESSOR) 25 MG tablet TAKE 1 TABLET TWICE DAILY 180 tablet 3    pravastatin (PRAVACHOL) 20 MG tablet TAKE 1 TABLET EVERY DAY 90 tablet 3    aspirin (ECOTRIN) 81 MG EC tablet Take 81 mg by mouth. 1 Tablet, Delayed Release (E.C.) Oral Every day      dicyclomine (BENTYL) 20 mg tablet Take 20 mg by mouth every 6 (six) hours.      gabapentin (NEURONTIN) 100 MG capsule Take 100 mg by mouth 3 (three) times daily.      PROLIA 60 mg/mL Syrg every 6 (six) months.         Past Surgical History:   Procedure Laterality Date    APPLICATION OF CARTILAGE GRAFT Bilateral 12/19/2019    Procedure: APPLICATION,  CARTILAGE GRAFT AURICULAR JEZ;  Surgeon: Martinez Flores III, MD;  Location: [facility];  Service: ENT;  Laterality: Bilateral;    CARDIAC PACEMAKER PLACEMENT  09/07/2012    [id number]    CATARACT EXTRACTION W/  INTRAOCULAR LENS IMPLANT Right 5/16/2022    Procedure: EXTRACTION, CATARACT, WITH IOL INSERTION;  Surgeon: Ines Aguilera MD;  Location: [facility] OR;  Service: Ophthalmology;  Laterality: Right;  Catalys     CATARACT EXTRACTION W/  INTRAOCULAR LENS IMPLANT Left 6/20/2022    Procedure: EXTRACTION, CATARACT, WITH IOL INSERTION;  Surgeon: Ines Aguilera MD;  Location: [facility];  Service: Ophthalmology;  Laterality: Left;  Catalys     DILATION AND CURETTAGE OF UTERUS      Hx of precancerous cells?    ENDOSCOPIC ULTRASOUND OF UPPER GASTROINTESTINAL TRACT N/A 7/5/2019    Procedure: ULTRASOUND, UPPER GI TRACT, ENDOSCOPIC;  Surgeon: Kev Calderon MD;  Location: [facility] (2ND FLR);  Service: Endoscopy;  Laterality: N/A;  Pacemaker-Adam   appt confirmed-rb    MYELOGRAPHY N/A 5/4/2021    Procedure: Myelogram  CERVICAL, THORACIC AND LUMBAR;  Surgeon: Leandro Diagnostic Provider;  Location: [facility] 2ND FLR;  Service: Radiology;  Laterality: N/A;    NASAL SEPTOPLASTY N/A 12/19/2019    Procedure: SEPTOPLASTY, NOSE;  Surgeon: Martinez Flores III, MD;  Location: [facility];  Service: ENT;  Laterality: N/A;  FOLLOW DR SALVATORE KIMBROUGH PROTOCOL    OPEN REDUCTION AND INTERNAL FIXATION (ORIF) OF FRACTURE OF DISTAL RADIUS Left 1/24/2020    Procedure: ORIF, FRACTURE, RADIUS, DISTAL-left;  Surgeon: Ellen Moe MD;  Location: [facility];  Service: Orthopedics;  Laterality: Left;    SURGICAL REMOVAL OF NASAL TURBINATE Bilateral 12/19/2019    Procedure: EXCISION, NASAL TURBINATE;  Surgeon: Martinez Flores III, MD;  Location: [facility];  Service: ENT;  Laterality: Bilateral;    TONSILLECTOMY      WISDOM TOOTH EXTRACTION         Social History     Socioeconomic History    Marital status:    Occupational  History    Occupation: /Rabbi     Employer: [employer]   Tobacco Use    Smoking status: Former     Packs/day: 0.25     Years: 15.00     Pack years: 3.75     Types: Cigarettes     Quit date: 1982     Years since quittin.2    Smokeless tobacco: Former   Substance and Sexual Activity    Alcohol use: Yes     Comment: no daily or heavy use, ~4 times per month    Drug use: No    Sexual activity: Not Currently     Partners: Male     Social Determinants of Health     Financial Resource Strain: Low Risk     Difficulty of Paying Living Expenses: Not very hard   Food Insecurity: No Food Insecurity    Worried About Running Out of Food in the Last Year: Never true    Ran Out of Food in the Last Year: Never true   Transportation Needs: No Transportation Needs    Lack of Transportation (Medical): No    Lack of Transportation (Non-Medical): No   Physical Activity: Insufficiently Active    Days of Exercise per Week: 1 day    Minutes of Exercise per Session: 60 min   Stress: No Stress Concern Present    Feeling of Stress : Only a little   Social Connections: Moderately Integrated    Frequency of Communication with Friends and Family: More than three times a week    Frequency of Social Gatherings with Friends and Family: Once a week    Attends Cheondoism Services: More than 4 times per year    Active Member of Clubs or Organizations: Yes    Attends Club or Organization Meetings: More than 4 times per year    Marital Status:    Housing Stability: Low Risk     Unable to Pay for Housing in the Last Year: No    Number of Places Lived in the Last Year: 1    Unstable Housing in the Last Year: No       OBJECTIVE:     Vital Signs Range (Last 24H):  Temp:  [37.1 °C (98.8 °F)]   Pulse:  [81]   Resp:  [16]   BP: (85)/(44-53)   SpO2:  [96 %]       Significant Labs:  Lab Results   Component Value Date    WBC 10.53 2022    HGB 11.2 (L) 2022    HCT 32.5 (L) 2022    PLT  145 (L) 09/18/2022    CHOL 176 08/05/2022    TRIG 78 08/05/2022    HDL 66 08/05/2022     (H) 09/18/2022     (H) 09/18/2022     (L) 09/18/2022    K 4.0 09/18/2022     09/18/2022    CREATININE 1.0 09/18/2022    BUN 23 09/18/2022    CO2 22 (L) 09/18/2022    TSH 2.710 08/05/2022    INR 1.0 06/10/2021    HGBA1C 5.3 08/05/2022       Diagnostic Studies: No relevant studies.    EKG:   Results for orders placed or performed during the hospital encounter of 09/18/22   EKG 12-lead    Collection Time: 09/18/22  7:33 PM    Narrative    Test Reason : R55,    Vent. Rate : 065 BPM     Atrial Rate : 500 BPM     P-R Int : 000 ms          QRS Dur : 136 ms      QT Int : 416 ms       P-R-T Axes : 000 084 051 degrees     QTc Int : 432 ms    Electronic atrial pacemaker wiht prolonged AV conduction  Right bundle branch block  Abnormal ECG  When compared with ECG of 12-OCT-2021 10:16,  No significant change was found  Confirmed by NNAMDI GARDNER MD ([id number]) on 9/19/2022 12:23:32 PM    Referred By: [email or web address]   SELF           Confirmed By:NNAMDI GARDNER MD       2D ECHO:  TTE:  Results for orders placed or performed in visit on 01/25/19   Transthoracic echo (TTE) complete (Cupid Only)   Result Value Ref Range    Ascending aorta 3.49 cm    STJ 2.84 cm    AV mean gradient 7.42 mmHg    Ao peak will 1.90 m/s    Ao VTI 44.24 cm    IVRT 0.06 msec    IVS 0.87 0.6 - 1.1 cm    LA size 4.04 cm    Left Atrium Major Axis 5.26 cm    Left Atrium Minor Axis 5.31 cm    LVIDd 4.27 3.5 - 6.0 cm    LVIDs 2.87 2.1 - 4.0 cm    LVOT diameter 2.02 cm    LVOT peak VTI 38.54 cm    Posterior Wall 0.65 0.6 - 1.1 cm    MV Peak A Will 0.79 m/s    E wave deceleration time 219.91 msec    MV Peak E Will 0.78 m/s    PV Peak D Will 0.35 m/s    PV Peak S Will 0.35 m/s    RA Major Axis 4.99 cm    RA Width 2.04 cm    RVDD 2.66 cm    TAPSE 2.21 cm    TR Max Will 2.47 m/s    LA WIDTH 3.48 cm    PV PEAK VELOCITY 0.97 cm/s    LV Diastolic Volume 81.57 mL    LV  Systolic Volume 31.38 mL    LVOT peak will 1.6 m/s    FS 33 %    LA volume 63.16 cm3    LV mass 97.32 g    Left Ventricle Relative Wall Thickness 0.30 cm    AV valve area 2.79 cm2    AV Velocity Ratio 0.84     AV index (prosthetic) 0.87     E/A ratio 0.99     Pulm vein S/D ratio 1.00     LVOT area 3.20 cm2    LVOT stroke volume 123.45 cm3    AV peak gradient 14.44 mmHg    Triscuspid Valve Regurgitation Peak Gradient 24.40 mmHg    BSA 1.89 m2    TDI SEPTAL 0.03     LV LATERAL E/E' RATIO 13.00     LV SEPTAL E/E' RATIO 26.00     TDI LATERAL 0.06     Mean e' 0.05     E/E' ratio 17.33     LV Systolic Volume Index 16.7 mL/m2    LV Diastolic Volume Index 43.31 mL/m2    LA Volume Index 33.5 mL/m2    LV Mass Index 51.7 g/m2    Right Atrial Pressure (from IVC) 3 mmHg    Sinus 3.00 cm    AV regurgitation pressure 1/2 time 410 ms    TV rest pulmonary artery pressure 27 mmHg    Narrative    · Mild-to-moderate aortic regurgitation.  · Mild mitral regurgitation.  · Mild tricuspid regurgitation.  · Normal central venous pressure (3 mm Hg).  · The estimated PA systolic pressure is 27 mm Hg  · Normal left ventricular systolic function. The estimated ejection   fraction is 60%  · Indeterminate left ventricular diastolic function.  · Normal right ventricular systolic function.          SUE:  No results found. However, due to the size of the patient record, not all encounters were searched. Please check Results Review for a complete set of results.    ASSESSMENT/PLAN:       Pre-op Assessment    I have reviewed the Patient Summary Reports.       I have reviewed the Medications.     Review of Systems  Anesthesia Hx:  No problems with previous Anesthesia  History of prior surgery of interest to airway management or planning: Previous anesthesia: General   Social:  Former Smoker 3.75 pack years   Hematology/Oncology:  Hematology Normal   Oncology Normal     EENT/Dental:EENT/Dental Normal   Cardiovascular:   Exercise tolerance: good  Hypertension, well controlled Dysrhythmias    Pulmonary:   Sleep Apnea    Renal/:   Chronic Renal Disease, CKD    Hepatic/GI:  Hepatic/GI Normal    Musculoskeletal:  Musculoskeletal Normal    Neurological:   Neuromuscular Disease,    Endocrine:  Endocrine Normal    Dermatological:  Skin Normal    Psych:   Psychiatric History          Physical Exam  General: Well nourished, Cooperative, Alert and Oriented    Airway:  Mallampati: II   Mouth Opening: Normal  TM Distance: Normal  Tongue: Normal  Neck ROM: Normal ROM    Dental:  Intact        Anesthesia Plan  Type of Anesthesia, risks & benefits discussed:    Anesthesia Type: Gen Natural Airway, Gen Supraglottic Airway  Intra-op Monitoring Plan: Standard ASA Monitors  Post Op Pain Control Plan: multimodal analgesia and IV/PO Opioids PRN  Induction:  IV  Airway Plan: , Post-Induction  Informed Consent: Informed consent signed with the Patient and all parties understand the risks and agree with anesthesia plan.  All questions answered.   ASA Score: 3    Ready For Surgery From Anesthesia Perspective.     .

## 2022-10-07 NOTE — BRIEF OP NOTE
Attending: John Sheets MD  Date of Procedure: 10/07/2022    Post-operative Diagnosis: generator at YAS status    Procedure Performed: generator change    Description of Procedure: The patient was brought to the EP lab in the fasting state. Prepped and draped in sterile fashion. Safety timeout was performed. Sedation administered by anesthesia staff. Prophylactic IV antibiotics given. Lidocaine used for local anesthetic. Incision made. Blunt and electrocautery dissection performed to level of existing generator. Hemostasis achieved. Pocket washed with antibiotic solution. Tyrx pouch placed in pocket. Generator connected and placed in pocket. Pocket washed with antibiotic solution. Deep layer closed with interrupted 3-0 suture. Intermediate layer closed with running 3-0 suture. Superficial layer closed with running 4-0 suture. Skin closed with Dermabond. Meplex dressing to be placed after dermabond has dried.     EBL: <10 mL    Specimens Removed: None  Complications: no immediate    Plan:  - Antibiotics x 5 days  - Device clinic follow up in 1 week  - ECG post-procedure  - Discharge home following 1 hour bedrest if voiding, tolerating PO, and no signs of complications    Jay Burris MD  [id number]

## 2022-10-07 NOTE — DISCHARGE SUMMARY
[facility] - Short Stay Cardiac Unit  Cardiac Electrophysiology  Discharge Summary      Patient Name: Manisha Wynne  MRN: [id number]  Admission Date: 10/7/2022  Hospital Length of Stay: 0 days  Discharge Date and Time:  10/07/2022 3:48 PM  Attending Physician: No att. providers found    Discharging Provider: Marco A Burris MD  Primary Care Physician: Iris Blue MD    Hospital Course:  Patient presented for generator change secondary to YAS status. No intra or post-procedure complications encountered. Discharged with antibiotics x 5 days. Device clinic f/u in 1 week.     HPI:   See admit H&P    Procedure(s) (LRB):  REPLACEMENT, PACEMAKER GENERATOR (Left)     Indwelling Lines/Drains at time of discharge:  Lines/Drains/Airways     None               Goals of Care Treatment Preferences:  Code Status: Full Code    Living Will: Yes     Significant Diagnostic Studies: Labs: All labs within the past 24 hours have been reviewed    Pending Diagnostic Studies:     None          There are no hospital problems to display for this patient.    No new Assessment & Plan notes have been filed under this hospital service since the last note was generated.  Service: Arrhythmia      Discharged Condition: stable    Disposition: Home or Self Care    Patient Instructions:    Do not submerge your surgical incision site under water (swimming, bathing if you submerge the actual surgical site) for at least 4 week   If you are on a blood thinner (examples: apixiban [Eliquis], rivaroxaban [Xarelto], dabigatran [Pradaxa], or enoxaparin [Lovenox]), do not take your blood thinner for the next 5 days after your procedure. You can resume after 5 days post-procedure (i.e., when you complete your antibiotics). If you are on Coumadin you can continue to take it the day of your procedure   Complete your 5 days of antibiotics   Follow up in device clinic in 1 week to check your incision and device function   Please contact the  electrophysiology clinic if you experience: potential surgical/pocket site complications (pain, swelling, bleeding, drainage), fevers, chest pain/shortness of breath, or for any other concerns.    Medications:  Reconciled Home Medications:      Medication List      START taking these medications    doxycycline 100 MG Cap  Commonly known as: VIBRAMYCIN  Take 1 capsule (100 mg total) by mouth every 12 (twelve) hours. for 5 days        CONTINUE taking these medications    aspirin 81 MG EC tablet  Commonly known as: ECOTRIN  Take 81 mg by mouth. 1 Tablet, Delayed Release (E.C.) Oral Every day     buPROPion 300 MG 24 hr tablet  Commonly known as: WELLBUTRIN XL  Take 300 mg by mouth once daily.     calcium-vitamin D3 600 mg-5 mcg (200 unit) Tab  Commonly known as: CALCIUM 600 + D(3)  Take 1 tablet by mouth once daily.     cholecalciferol (vitamin D3) 125 mcg (5,000 unit) Tab  Take 5,000 Units by mouth once daily.     dicyclomine 20 mg tablet  Commonly known as: BENTYL  Take 20 mg by mouth every 6 (six) hours.     estradioL 0.01 % (0.1 mg/gram) vaginal cream  Commonly known as: ESTRACE  Place 1 g vaginally twice a week.     FLUoxetine 40 MG capsule  Take 40 mg by mouth once daily.     gabapentin 100 MG capsule  Commonly known as: NEURONTIN  Take 100 mg by mouth 3 (three) times daily.     lisdexamfetamine 70 MG capsule  Commonly known as: VYVANSE  Take 1 capsule (70 mg total) by mouth every morning.     losartan 25 MG tablet  Commonly known as: COZAAR  TAKE 1 TABLET EVERY DAY     metoprolol tartrate 25 MG tablet  Commonly known as: LOPRESSOR  TAKE 1 TABLET TWICE DAILY     pravastatin 20 MG tablet  Commonly known as: PRAVACHOL  TAKE 1 TABLET EVERY DAY     PROLIA 60 mg/mL Syrg  Generic drug: denosumab  every 6 (six) months.     vancomycin 125 MG capsule  Commonly known as: VANCOCIN  Take 1 capsule (125 mg total) by mouth 4 (four) times daily. for 5 days            Time spent on the discharge of patient: 15 minutes    Marco A BUNCH  MD Fat[facility]  Cardiac Electrophysiology  [facility] - Short Stay Cardiac Unit   Never smoker

## 2022-10-07 NOTE — HOSPITAL COURSE
Patient presented for generator change secondary to YAS status. No intra or post-procedure complications encountered. Discharged with antibiotics x 5 days. Device clinic f/u in 1 week.

## 2022-10-07 NOTE — DISCHARGE INSTRUCTIONS
Do not submerge your surgical incision site under water (swimming, bathing if you submerge the actual surgical site) for at least 4 week  If you are on a blood thinner (examples: apixiban [Eliquis], rivaroxaban [Xarelto], dabigatran [Pradaxa], or enoxaparin [Lovenox]), do not take your blood thinner for the next 5 days after your procedure. You can resume after 5 days post-procedure (i.e., when you complete your antibiotics). If you are on Coumadin you can continue to take it the day of your procedure  Complete your 5 days of antibiotics  Follow up in device clinic in 1 week to check your incision and device function  Please contact the electrophysiology clinic if you experience: potential surgical/pocket site complications (pain, swelling, bleeding, drainage), fevers, chest pain/shortness of breath, or for any other concerns.

## 2022-10-07 NOTE — PROGRESS NOTES
Up with assistance. Able to ambulate and void without complication. Back in bed. Brought personal belongings to bedside.

## 2022-10-07 NOTE — INTERVAL H&P NOTE
The patient has been examined and the H&P has been reviewed:    Patient here for generator change secondary to YAS status. Not on OAC. No complaints this morning.      I concur with the findings and no changes have occurred since H&P was written.    Procedure risks, benefits and alternative options discussed and understood by patient/family.          There are no hospital problems to display for this patient.

## 2022-10-11 DIAGNOSIS — I49.8 OTHER SPECIFIED CARDIAC ARRHYTHMIAS: Primary | ICD-10-CM

## 2022-10-11 NOTE — ANESTHESIA POSTPROCEDURE EVALUATION
Anesthesia Post Evaluation    Patient: Manisha Wynne    Procedure(s) Performed: Procedure(s) (LRB):  REPLACEMENT, PACEMAKER GENERATOR (Left)    Final Anesthesia Type: MAC      Patient location during evaluation: PACU  Patient participation: Yes- Able to Participate  Level of consciousness: awake and alert  Post-procedure vital signs: reviewed and stable  Pain management: adequate  Airway patency: patent    PONV status at discharge: No PONV  Anesthetic complications: no      Cardiovascular status: blood pressure returned to baseline  Respiratory status: unassisted  Hydration status: euvolemic  Follow-up not needed.          Vitals Value Taken Time   /92 10/07/22 1312   Temp 36.6 °C (97.9 °F) 10/07/22 0903   Pulse 66 10/07/22 1315   Resp 18 10/07/22 1315   SpO2 99 % 10/07/22 1315   Vitals shown include unvalidated device data.      No case tracking events are documented in the log.      Pain/Corie Score: No data recorded

## 2022-10-12 ENCOUNTER — PATIENT MESSAGE (OUTPATIENT)
Dept: ORTHOPEDICS | Facility: CLINIC | Age: 72
End: 2022-10-12
Payer: MEDICARE

## 2022-10-14 ENCOUNTER — CLINICAL SUPPORT (OUTPATIENT)
Dept: CARDIOLOGY | Facility: HOSPITAL | Age: 72
End: 2022-10-14
Attending: INTERNAL MEDICINE
Payer: MEDICARE

## 2022-10-14 DIAGNOSIS — I45.10 RBBB: ICD-10-CM

## 2022-10-14 DIAGNOSIS — Z45.010 PACEMAKER GENERATOR END OF LIFE: ICD-10-CM

## 2022-10-14 DIAGNOSIS — I44.0 AV BLOCK, 1ST DEGREE: ICD-10-CM

## 2022-10-14 DIAGNOSIS — I49.5 SSS (SICK SINUS SYNDROME): ICD-10-CM

## 2022-10-14 PROCEDURE — 93280 PM DEVICE PROGR EVAL DUAL: CPT

## 2022-10-14 PROCEDURE — 93280 PM DEVICE PROGR EVAL DUAL: CPT | Mod: 26,,, | Performed by: INTERNAL MEDICINE

## 2022-10-14 PROCEDURE — 93280 CARDIAC DEVICE CHECK - IN CLINIC & HOSPITAL: ICD-10-PCS | Mod: 26,,, | Performed by: INTERNAL MEDICINE

## 2022-10-21 ENCOUNTER — TELEPHONE (OUTPATIENT)
Dept: PREADMISSION TESTING | Facility: HOSPITAL | Age: 72
End: 2022-10-21
Payer: MEDICARE

## 2022-10-21 ENCOUNTER — TELEPHONE (OUTPATIENT)
Dept: CARDIOLOGY | Facility: CLINIC | Age: 72
End: 2022-10-21
Payer: MEDICARE

## 2022-10-21 DIAGNOSIS — Z01.818 PREOPERATIVE TESTING: Primary | ICD-10-CM

## 2022-10-21 NOTE — TELEPHONE ENCOUNTER
----- Message from Vilma Mendoza RN sent at 10/21/2022  2:04 PM CDT -----  Patient is scheduled for laminectomy , posterior cervical fusion C3-C6 on 11/14 with .( approximately 270 minutes of general anesthesia) She will need cardiac clearance. She is scheduled with you for a 1 year fu appt on 11/1. Can she get her clearance then? If not, please schedule a preop clearance appt.  Thanks!

## 2022-10-21 NOTE — ANESTHESIA PAT ROS NOTE
10/21/2022  Manisha Wynne is a 72 y.o., female.      Pre-op Assessment          Review of Systems         Anesthesia Assessment: Preoperative EQUATION    Planned Procedure: Procedure(s) (LRB):  LAMINECTOMY, SPINE, CERVICAL, WITH POSTERIOR FUSION (N/A)  Requested Anesthesia Type:General  Surgeon: Nicolette Cheek MD  Service: Neurosurgery  Known or anticipated Date of Surgery:11/14/2022    Surgeon notes: reviewed    Electronic QUestionnaire Assessment completed via nurse interview with patient.        Triage considerations:         Previous anesthesia records:MAC and No problems  10/7/2022 REPLACEMENT, PACEMAKER GENERATOR (Left: Chest)  Airway:  Mallampati: II   Mouth Opening: Normal  TM Distance: Normal  Tongue: Normal  Neck ROM: Normal ROM    Last PCP note: 3-6 months ago , within [facility]   Subspecialty notes:  Cardiology: EP, Endocrinology, ENT, Gastroenterology, Hematology/Oncology, Hepatology, Infectious Disease, Neurology, Neurosurgery, Rheumatology, URO/GYN, OPTOMETRY, OPHTHALMOLOGY, PODIATRY    Other important co-morbidities: PER Epic: A FIB, HLD, HTN, CEDRIC, and CERVICAL SPONDYLOSIS, CKD, OSTEOPOROSIS       Tests already available:  Available tests,  within 3 months , within [facility] .   10/7/2022 PTT, PT/INR, EKG, 9/18/2022 UA, CMP, CBC, CT CERVICAL SPINE W/O CONTRAST, 8/16/2022 XRAY CERVICAL SPINE AP/LAT W F/E          Instructions given. (See in Nurse's note)    Optimization:  Anesthesia Preop Clinic Assessment  Indicated    Medical Opinion Indicated       Sub-specialist consult indicated:  CARDIOLOGY       Plan:    Testing:  T&S   Pre-anesthesia  visit       Visit focus: concerns in complex and/or prolonged anesthesia, COMORBIDITIES     Consultation:Patient's PCP for re-evaluation     Patient  has previously scheduled Medical Appointment:10/25 ENT, 11/1 [facility]-   [facility]    Navigation: Tests Scheduled. TBD             Consults scheduled.TBD             Results will be tracked by Preop Clinic.  10/24 Cardiac clearance by Dr.Yvonne Comer  on 10/24:   October 24, 2022  Natali Comer MD  to Ivanna Valverde LPN      8:45 AM   Pt has no active cardiac condition (ACS/USA, decompenstated CHF, significant arrhythmias or severe valvular disease) and can easily achieve 4 METS.  Pt does not require any further workup prior to undergoing surgery. ASA can be held as needed I usually 7 days prior) but should be restarted as soon as possible after surgery is completed.  Pt should remain on beta-blockers throughout the entire slim-operative time period. The other cardiac meds can be held at Surgery's discretion but should be restarted as soon as safely possible after surgery.  These recommendations follow the most current Guideline on Perioperative Cardiovascular Evaluation and Management of Patients Undergoing Noncardiac Surgery released by the ACC/AHA. (JACC 2014.07.944).   11/11 Medical clearance given by Dr. Iris Blue on 11/11: Pt is at acceptable risk for anesthesia and surgery , all medical problems have been maximally optimized and she is at low to moderate risk for cardio-pulmonary complications.   11/11 T&S resulted.  Vilma Mendoza RN BSN

## 2022-10-21 NOTE — PRE-PROCEDURE INSTRUCTIONS
Patient stated has not had any problem with anesthesia in the past. Will need medical clearance from your PCP, Dr. Blue or . She will call her PCP for an appt. Will need cardiac clearance from . She has an appt on 11/1. Will need poc appt and T& S. Our  demetria call to set up theses appts.She said she takes ASA 81 mg for prevention.Patient stated her new pacemaker is a [employer].   Preop instructions given. Hold aspirin, aspirin containing products, nsaids(aleve, advil, motrin, ibuprofen, naprosyn, naproxen, voltaren, diclofenac), vitamins ( Calcium wth VitaminD, D3) and supplements one week prior to surgery.     May take Tylenol.  Verbalizes understanding.

## 2022-10-22 ENCOUNTER — PATIENT MESSAGE (OUTPATIENT)
Dept: INFECTIOUS DISEASES | Facility: CLINIC | Age: 72
End: 2022-10-22
Payer: MEDICARE

## 2022-10-22 ENCOUNTER — PATIENT MESSAGE (OUTPATIENT)
Dept: CARDIOLOGY | Facility: CLINIC | Age: 72
End: 2022-10-22
Payer: MEDICARE

## 2022-10-22 ENCOUNTER — PATIENT MESSAGE (OUTPATIENT)
Dept: SURGERY | Facility: HOSPITAL | Age: 72
End: 2022-10-22
Payer: MEDICARE

## 2022-10-24 ENCOUNTER — TELEPHONE (OUTPATIENT)
Dept: INTERNAL MEDICINE | Facility: CLINIC | Age: 72
End: 2022-10-24

## 2022-10-25 ENCOUNTER — TELEPHONE (OUTPATIENT)
Dept: CARDIOLOGY | Facility: CLINIC | Age: 72
End: 2022-10-25
Payer: MEDICARE

## 2022-10-25 ENCOUNTER — OFFICE VISIT (OUTPATIENT)
Dept: OTOLARYNGOLOGY | Facility: CLINIC | Age: 72
End: 2022-10-25
Payer: MEDICARE

## 2022-10-25 VITALS — HEART RATE: 77 BPM | DIASTOLIC BLOOD PRESSURE: 86 MMHG | TEMPERATURE: 98 F | SYSTOLIC BLOOD PRESSURE: 127 MMHG

## 2022-10-25 DIAGNOSIS — E78.5 DYSLIPIDEMIA: ICD-10-CM

## 2022-10-25 DIAGNOSIS — J34.89 NASAL OBSTRUCTION: ICD-10-CM

## 2022-10-25 DIAGNOSIS — E78.2 MIXED HYPERLIPIDEMIA: ICD-10-CM

## 2022-10-25 DIAGNOSIS — J34.2 NASAL SEPTAL DEVIATION: Primary | ICD-10-CM

## 2022-10-25 DIAGNOSIS — I10 ESSENTIAL HYPERTENSION: Primary | ICD-10-CM

## 2022-10-25 PROCEDURE — 99214 OFFICE O/P EST MOD 30 MIN: CPT | Mod: S$GLB,,, | Performed by: OTOLARYNGOLOGY

## 2022-10-25 PROCEDURE — 99214 PR OFFICE/OUTPT VISIT, EST, LEVL IV, 30-39 MIN: ICD-10-PCS | Mod: S$GLB,,, | Performed by: OTOLARYNGOLOGY

## 2022-10-25 PROCEDURE — 1157F PR ADVANCE CARE PLAN OR EQUIV PRESENT IN MEDICAL RECORD: ICD-10-PCS | Mod: CPTII,S$GLB,, | Performed by: OTOLARYNGOLOGY

## 2022-10-25 PROCEDURE — 1160F PR REVIEW ALL MEDS BY PRESCRIBER/CLIN PHARMACIST DOCUMENTED: ICD-10-PCS | Mod: CPTII,S$GLB,, | Performed by: OTOLARYNGOLOGY

## 2022-10-25 PROCEDURE — 1160F RVW MEDS BY RX/DR IN RCRD: CPT | Mod: CPTII,S$GLB,, | Performed by: OTOLARYNGOLOGY

## 2022-10-25 PROCEDURE — 4010F PR ACE/ARB THEARPY RXD/TAKEN: ICD-10-PCS | Mod: CPTII,S$GLB,, | Performed by: OTOLARYNGOLOGY

## 2022-10-25 PROCEDURE — 3074F SYST BP LT 130 MM HG: CPT | Mod: CPTII,S$GLB,, | Performed by: OTOLARYNGOLOGY

## 2022-10-25 PROCEDURE — 1101F PR PT FALLS ASSESS DOC 0-1 FALLS W/OUT INJ PAST YR: ICD-10-PCS | Mod: CPTII,S$GLB,, | Performed by: OTOLARYNGOLOGY

## 2022-10-25 PROCEDURE — 1126F PR PAIN SEVERITY QUANTIFIED, NO PAIN PRESENT: ICD-10-PCS | Mod: CPTII,S$GLB,, | Performed by: OTOLARYNGOLOGY

## 2022-10-25 PROCEDURE — 3044F HG A1C LEVEL LT 7.0%: CPT | Mod: CPTII,S$GLB,, | Performed by: OTOLARYNGOLOGY

## 2022-10-25 PROCEDURE — 1126F AMNT PAIN NOTED NONE PRSNT: CPT | Mod: CPTII,S$GLB,, | Performed by: OTOLARYNGOLOGY

## 2022-10-25 PROCEDURE — 1159F PR MEDICATION LIST DOCUMENTED IN MEDICAL RECORD: ICD-10-PCS | Mod: CPTII,S$GLB,, | Performed by: OTOLARYNGOLOGY

## 2022-10-25 PROCEDURE — 1159F MED LIST DOCD IN RCRD: CPT | Mod: CPTII,S$GLB,, | Performed by: OTOLARYNGOLOGY

## 2022-10-25 PROCEDURE — 3044F PR MOST RECENT HEMOGLOBIN A1C LEVEL <7.0%: ICD-10-PCS | Mod: CPTII,S$GLB,, | Performed by: OTOLARYNGOLOGY

## 2022-10-25 PROCEDURE — 3079F DIAST BP 80-89 MM HG: CPT | Mod: CPTII,S$GLB,, | Performed by: OTOLARYNGOLOGY

## 2022-10-25 PROCEDURE — 3074F PR MOST RECENT SYSTOLIC BLOOD PRESSURE < 130 MM HG: ICD-10-PCS | Mod: CPTII,S$GLB,, | Performed by: OTOLARYNGOLOGY

## 2022-10-25 PROCEDURE — 3079F PR MOST RECENT DIASTOLIC BLOOD PRESSURE 80-89 MM HG: ICD-10-PCS | Mod: CPTII,S$GLB,, | Performed by: OTOLARYNGOLOGY

## 2022-10-25 PROCEDURE — 4010F ACE/ARB THERAPY RXD/TAKEN: CPT | Mod: CPTII,S$GLB,, | Performed by: OTOLARYNGOLOGY

## 2022-10-25 PROCEDURE — 1101F PT FALLS ASSESS-DOCD LE1/YR: CPT | Mod: CPTII,S$GLB,, | Performed by: OTOLARYNGOLOGY

## 2022-10-25 PROCEDURE — 1157F ADVNC CARE PLAN IN RCRD: CPT | Mod: CPTII,S$GLB,, | Performed by: OTOLARYNGOLOGY

## 2022-10-25 PROCEDURE — 3288F FALL RISK ASSESSMENT DOCD: CPT | Mod: CPTII,S$GLB,, | Performed by: OTOLARYNGOLOGY

## 2022-10-25 PROCEDURE — 3288F PR FALLS RISK ASSESSMENT DOCUMENTED: ICD-10-PCS | Mod: CPTII,S$GLB,, | Performed by: OTOLARYNGOLOGY

## 2022-10-25 NOTE — PROGRESS NOTES
Three years S/P nasal reconstruction with bilateral auricular cartilage batten grafts,septo,turbs.  She sustained a fall at [facility] 1 1/2 months ago striking her face and nose.  She was seen in the ED and evaluated with a maxillofacial CT and 1 suture placed in a dorsal laceration.  She presented for follow-up to me after 10 days.  She complained of nasal obstruction and this has been persistent since her last visit.  On exam she still has marked septal deviation to the left with 2+ turbinates creating 95% obstruction.  Her nasal dorsum is no longer tender and her bilateral ecchymoses of her cheeks and lower lids have resolved.  I have discussed my findings with her in detail as well as my recommendations for treatment.  We have discussed a revision septoplasty as her trauma has dislocated this markedly to the left causing nasal obstruction.  She does state that she finds herself mouth breathing at night.  She is scheduled for a cervical laminectomy next month and I have told her that the revision septoplasty is not an emergency procedure.  After she heals from her cervical laminectomy she will consider revision septoplasty.

## 2022-10-27 ENCOUNTER — TELEPHONE (OUTPATIENT)
Dept: CARDIOLOGY | Facility: CLINIC | Age: 72
End: 2022-10-27
Payer: MEDICARE

## 2022-10-27 ENCOUNTER — LAB VISIT (OUTPATIENT)
Dept: LAB | Facility: HOSPITAL | Age: 72
End: 2022-10-27
Attending: INTERNAL MEDICINE
Payer: MEDICARE

## 2022-10-27 DIAGNOSIS — E78.2 MIXED HYPERLIPIDEMIA: ICD-10-CM

## 2022-10-27 DIAGNOSIS — E78.5 DYSLIPIDEMIA: ICD-10-CM

## 2022-10-27 DIAGNOSIS — I10 ESSENTIAL HYPERTENSION: ICD-10-CM

## 2022-10-27 LAB
ALBUMIN SERPL BCP-MCNC: 3.9 G/DL (ref 3.5–5.2)
ALP SERPL-CCNC: 57 U/L (ref 55–135)
ALT SERPL W/O P-5'-P-CCNC: 30 U/L (ref 10–44)
ANION GAP SERPL CALC-SCNC: 6 MMOL/L (ref 8–16)
AST SERPL-CCNC: 26 U/L (ref 10–40)
BILIRUB SERPL-MCNC: 1.2 MG/DL (ref 0.1–1)
BUN SERPL-MCNC: 21 MG/DL (ref 8–23)
CALCIUM SERPL-MCNC: 9.9 MG/DL (ref 8.7–10.5)
CHLORIDE SERPL-SCNC: 106 MMOL/L (ref 95–110)
CHOLEST SERPL-MCNC: 201 MG/DL (ref 120–199)
CHOLEST/HDLC SERPL: 2.5 {RATIO} (ref 2–5)
CO2 SERPL-SCNC: 28 MMOL/L (ref 23–29)
CREAT SERPL-MCNC: 1 MG/DL (ref 0.5–1.4)
EST. GFR  (NO RACE VARIABLE): 59.1 ML/MIN/1.73 M^2
GLUCOSE SERPL-MCNC: 100 MG/DL (ref 70–110)
HDLC SERPL-MCNC: 80 MG/DL (ref 40–75)
HDLC SERPL: 39.8 % (ref 20–50)
LDLC SERPL CALC-MCNC: 105.2 MG/DL (ref 63–159)
NONHDLC SERPL-MCNC: 121 MG/DL
POTASSIUM SERPL-SCNC: 4.4 MMOL/L (ref 3.5–5.1)
PROT SERPL-MCNC: 7.1 G/DL (ref 6–8.4)
SODIUM SERPL-SCNC: 140 MMOL/L (ref 136–145)
TRIGL SERPL-MCNC: 79 MG/DL (ref 30–150)
TSH SERPL DL<=0.005 MIU/L-ACNC: 3.15 UIU/ML (ref 0.4–4)

## 2022-10-27 PROCEDURE — 84443 ASSAY THYROID STIM HORMONE: CPT | Performed by: INTERNAL MEDICINE

## 2022-10-27 PROCEDURE — 80061 LIPID PANEL: CPT | Performed by: INTERNAL MEDICINE

## 2022-10-27 PROCEDURE — 80053 COMPREHEN METABOLIC PANEL: CPT | Performed by: INTERNAL MEDICINE

## 2022-10-27 PROCEDURE — 36415 COLL VENOUS BLD VENIPUNCTURE: CPT | Performed by: INTERNAL MEDICINE

## 2022-10-27 NOTE — TELEPHONE ENCOUNTER
----- Message from Natali Comer MD sent at 10/27/2022 10:32 AM CDT -----  Please review.  We will discuss the results during your upcoming visit with me. The results look good.

## 2022-10-28 ENCOUNTER — IMMUNIZATION (OUTPATIENT)
Dept: PHARMACY | Facility: CLINIC | Age: 72
End: 2022-10-28
Payer: MEDICARE

## 2022-10-28 ENCOUNTER — TELEPHONE (OUTPATIENT)
Dept: ORTHOPEDICS | Facility: CLINIC | Age: 72
End: 2022-10-28
Payer: MEDICARE

## 2022-10-28 DIAGNOSIS — M25.531 RIGHT WRIST PAIN: Primary | ICD-10-CM

## 2022-10-28 NOTE — PROGRESS NOTES
[facility]  History & Physical  Orthopedics    SUBJECTIVE:      COVID-19 attestation:  This patient was treated during the COVID-19 pandemic.  This was discussed with the patient, they are aware of our current policies and procedures, were given the option of delaying their visit and or switching to a virtual visit, delaying their surgery when applicable, and they elect to proceed.    Chief Complaint: Right wrist pain     Referring Provider: No ref. provider found     History of Present Illness:  Patient is a 72 y.o. right hand dominant female who presents today with complaints of R wrist pain 6w after a fall with a minimally displaced R DRF. She was seen in the ED and treated in a splint for 3 weeks. She is now having some pain with lifting and decreased  strength.     The patient is a/an [facility] employee.    Onset of symptoms/DOI was 6w ago.    Symptoms are aggravated by activity.    Symptoms are alleviated by rest.    Symptoms consist of pain.    The patient rates their pain as a 5/10.    Attempted treatment(s) and/or interventions include activity modifications, rest, rest, activity modification, and immobilization.     The patient denies any fevers, chills, N/V, D/C and presents for evaluation.       Past Medical History:   Diagnosis Date    Abnormal Pap smear     Atrial fibrillation     AV block, 1st degree 07/25/2012    C. difficile diarrhea     RESOLVED    Cataract     Depression 07/24/2012    Facet arthritis of lumbar region 03/31/2015    Falls     3 falls in the last 6 mos--noted 6/19/19    Hyperlipidemia     Hypertension 07/24/2012    Neuropathy     Other specified cardiac dysrhythmias(427.89)     Sleep apnea     Syncope 07/24/2012     Past Surgical History:   Procedure Laterality Date    APPLICATION OF CARTILAGE GRAFT Bilateral 12/19/2019    Procedure: APPLICATION, CARTILAGE GRAFT AURICULAR JEZ;  Surgeon: Martinez Flores III, MD;  Location: [facility];  Service: ENT;  Laterality:  Bilateral;    CARDIAC PACEMAKER PLACEMENT  09/07/2012    [id number]    CATARACT EXTRACTION W/  INTRAOCULAR LENS IMPLANT Right 5/16/2022    Procedure: EXTRACTION, CATARACT, WITH IOL INSERTION;  Surgeon: Ines Aguilera MD;  Location: [facility] OR;  Service: Ophthalmology;  Laterality: Right;  Catalys     CATARACT EXTRACTION W/  INTRAOCULAR LENS IMPLANT Left 6/20/2022    Procedure: EXTRACTION, CATARACT, WITH IOL INSERTION;  Surgeon: Ines Aguilera MD;  Location: [facility] OR;  Service: Ophthalmology;  Laterality: Left;  Catalys     DILATION AND CURETTAGE OF UTERUS      Hx of precancerous cells?    ENDOSCOPIC ULTRASOUND OF UPPER GASTROINTESTINAL TRACT N/A 7/5/2019    Procedure: ULTRASOUND, UPPER GI TRACT, ENDOSCOPIC;  Surgeon: Kev Calderon MD;  Location: [facility] ENDO (2ND FLR);  Service: Endoscopy;  Laterality: N/A;  Pacemaker-Adam   appt confirmed-rb    MYELOGRAPHY N/A 5/4/2021    Procedure: Myelogram  CERVICAL, THORACIC AND LUMBAR;  Surgeon: [facility] Diagnostic Provider;  Location: [facility] 2ND FLR;  Service: Radiology;  Laterality: N/A;    NASAL SEPTOPLASTY N/A 12/19/2019    Procedure: SEPTOPLASTY, NOSE;  Surgeon: Martinez Flores III, MD;  Location: [facility];  Service: ENT;  Laterality: N/A;  FOLLOW DR SALVATORE KIMBROUGH PROTOCOL    OPEN REDUCTION AND INTERNAL FIXATION (ORIF) OF FRACTURE OF DISTAL RADIUS Left 1/24/2020    Procedure: ORIF, FRACTURE, RADIUS, DISTAL-left;  Surgeon: Ellen Moe MD;  Location: [facility] OR;  Service: Orthopedics;  Laterality: Left;    REPLACEMENT OF PACEMAKER GENERATOR Left 10/7/2022    Procedure: REPLACEMENT, PACEMAKER GENERATOR;  Surgeon: John Sheets MD;  Location: [facility] EP LAB;  Service: Cardiology;  Laterality: Left;  YAS, SSS, AVB, Dual PPM Gen Change,SJM, MAC ,TX, 3 prep,** Adam dcPPM in situ**    SURGICAL REMOVAL OF NASAL TURBINATE Bilateral 12/19/2019    Procedure: EXCISION, NASAL TURBINATE;  Surgeon: Martinez Flores III, MD;  Location: [facility];  Service: ENT;  Laterality:  Bilateral;    TONSILLECTOMY      WISDOM TOOTH EXTRACTION       Review of patient's allergies indicates:  No Known Allergies  Social History     Social History Narrative    Not on file     Family History   Adopted: Yes   Problem Relation Age of Onset    Amblyopia Neg Hx     Blindness Neg Hx     Cataracts Neg Hx     Glaucoma Neg Hx     Macular degeneration Neg Hx     Retinal detachment Neg Hx          Current Outpatient Medications:     aspirin (ECOTRIN) 81 MG EC tablet, Take 81 mg by mouth. 1 Tablet, Delayed Release (E.C.) Oral Every day, Disp: , Rfl:     buPROPion (WELLBUTRIN XL) 300 MG 24 hr tablet, Take 300 mg by mouth once daily. , Disp: , Rfl:     calcium-vitamin D 600 mg(1,500mg) -200 unit Tab, Take 1 tablet by mouth once daily. , Disp: , Rfl:     cholecalciferol, vitamin D3, 125 mcg (5,000 unit) Tab, Take 5,000 Units by mouth once daily., Disp: , Rfl:     dicyclomine (BENTYL) 20 mg tablet, Take 20 mg by mouth every 6 (six) hours. Takes prn, Disp: , Rfl:     estradioL (ESTRACE) 0.01 % (0.1 mg/gram) vaginal cream, Place 1 g vaginally twice a week., Disp: 42.5 g, Rfl: 2    FLUoxetine 40 MG capsule, Take 40 mg by mouth once daily., Disp: , Rfl:     gabapentin (NEURONTIN) 100 MG capsule, Take 100 mg by mouth 3 (three) times daily., Disp: , Rfl:     lisdexamfetamine (VYVANSE) 70 MG capsule, Take 1 capsule (70 mg total) by mouth every morning., Disp: 30 capsule, Rfl: 0    losartan (COZAAR) 25 MG tablet, TAKE 1 TABLET EVERY DAY, Disp: 90 tablet, Rfl: 6    metoprolol tartrate (LOPRESSOR) 25 MG tablet, TAKE 1 TABLET TWICE DAILY, Disp: 180 tablet, Rfl: 3    pravastatin (PRAVACHOL) 20 MG tablet, TAKE 1 TABLET EVERY DAY, Disp: 90 tablet, Rfl: 3    PROLIA 60 mg/mL Syrg, every 6 (six) months., Disp: , Rfl:   No current facility-administered medications for this visit.    Facility-Administered Medications Ordered in Other Visits:     vancomycin in dextrose 5 % 1 gram/250 mL IVPB 1,000 mg, 1,000 mg, Intravenous, On Call  "Procedure, Kina Kam, NP      Review of Systems:  As per HPI otherwise noncontributory    OBJECTIVE:      Vital Signs (Most Recent):  Vitals:    11/01/22 1422   BP: 111/65   Pulse: 62   Weight: 70.8 kg (156 lb)   Height: 5' 8" (1.727 m)     Body mass index is 23.72 kg/m².      Physical Exam:  Constitutional: The patient appears well-developed and well-nourished. No distress.   Skin: No lesions appreciated  Head: Normocephalic and atraumatic.   Nose: Nose normal.   Ears: No deformities seen  Eyes: Conjunctivae and EOM are normal.   Neck: No tracheal deviation present.   Cardiovascular: Normal rate and intact distal pulses.    Pulmonary/Chest: Effort normal. No respiratory distress.   Abdominal: There is no guarding.   Neurological: The patient is alert.   Psychiatric: The patient has a normal mood and affect.     Right Hand/Wrist Examination:    Observation/Inspection:  Swelling  none    Deformity  none  Discoloration  none     Scars   none    Atrophy  none    HAND/WRIST EXAMINATION:  Finkelstein's Test   Neg  WHAT Test    Neg  Snuff box tenderness   Neg  Butler's Test    Neg  Hook of Hamate Tenderness  Neg  CMC grind    Neg  Circumduction test   Neg    Neurovascular Exam:  Digits WWP, brisk CR < 3s throughout  NVI motor/LTS to M/R/U nerves, radial pulse 2+  Tinel's Test - Carpal Tunnel  Neg  Tinel's Test - Cubital Tunnel  Neg  Phalen's Test    Neg  Median Nerve Compression Test Neg    ROM hand full, painless    ROM wrist full, painless    ROM elbow full, painless    Abdomen not guarded  Respirations nonlabored  Perfusion intact    Diagnostic Results:     Imaging - I independently viewed the patient's imaging as well as the radiology report.  Xrays of the patient's show a healing R DRF intraarticular demonstrate no evidence of any acute fractures or dislocations or significant degenerative changes.    EMG - none     ASSESSMENT/PLAN:      72 y.o. yo female with R DRF 6w after injury with Xrays showing fracture " healing.    Plan: The patient and I had a thorough discussion today.  We discussed the working diagnosis as well as several other potential alternative diagnoses.  Treatment options were discussed, both conservative and surgical.  Conservative treatment options would include things such as activity modifications, workplace modifications, a period of rest, oral vs topical OTC and prescription anti-inflammatory medications, occupational therapy, splinting/bracing, immobilization, corticosteroid injections, and others.  Surgical options were discussed as well.     At this time, the patient would like to proceed with a trial of physical therapy.  Patient is workman's comp patient states that it took some time to get approved to get into clinic and at this point she does have what can is considered a malunion we had a long discussion about this however it is healed at like to try therapy at this point she does have some pain but it is marginal pain she was really just treated in a brace she did have appropriate immobilization we will have the further discussion with this patient states she has a e-mail length where she tried to email the Occupational Medicine Department about this she will stay for to me and I will further forward

## 2022-10-28 NOTE — TELEPHONE ENCOUNTER
LVM reminding patient of their appt with Dr Jordan Guevara on  11/1 at 2:30 and to have her come 30 minutes before to get Xrays and the 1st floor of [id number]

## 2022-10-29 ENCOUNTER — PATIENT MESSAGE (OUTPATIENT)
Dept: ORTHOPEDICS | Facility: CLINIC | Age: 72
End: 2022-10-29
Payer: MEDICARE

## 2022-10-31 ENCOUNTER — OFFICE VISIT (OUTPATIENT)
Dept: NEUROLOGY | Facility: CLINIC | Age: 72
End: 2022-10-31
Payer: MEDICARE

## 2022-10-31 VITALS
SYSTOLIC BLOOD PRESSURE: 137 MMHG | DIASTOLIC BLOOD PRESSURE: 78 MMHG | BODY MASS INDEX: 24.15 KG/M2 | WEIGHT: 159.38 LBS | HEART RATE: 63 BPM | HEIGHT: 68 IN

## 2022-10-31 DIAGNOSIS — R25.1 TREMOR: Primary | ICD-10-CM

## 2022-10-31 DIAGNOSIS — M48.02 CERVICAL STENOSIS OF SPINE: ICD-10-CM

## 2022-10-31 DIAGNOSIS — Z71.89 COUNSELING REGARDING GOALS OF CARE: ICD-10-CM

## 2022-10-31 DIAGNOSIS — R29.6 MULTIPLE FALLS: ICD-10-CM

## 2022-10-31 DIAGNOSIS — G60.8 SENSORY PERIPHERAL NEUROPATHY: ICD-10-CM

## 2022-10-31 DIAGNOSIS — N39.41 URGE INCONTINENCE OF URINE: ICD-10-CM

## 2022-10-31 DIAGNOSIS — G95.9 CERVICAL MYELOPATHY: ICD-10-CM

## 2022-10-31 PROCEDURE — 1160F RVW MEDS BY RX/DR IN RCRD: CPT | Mod: CPTII,S$GLB,, | Performed by: PHYSICIAN ASSISTANT

## 2022-10-31 PROCEDURE — 3075F SYST BP GE 130 - 139MM HG: CPT | Mod: CPTII,S$GLB,, | Performed by: PHYSICIAN ASSISTANT

## 2022-10-31 PROCEDURE — 99999 PR PBB SHADOW E&M-EST. PATIENT-LVL III: ICD-10-PCS | Mod: PBBFAC,,, | Performed by: PHYSICIAN ASSISTANT

## 2022-10-31 PROCEDURE — 4010F PR ACE/ARB THEARPY RXD/TAKEN: ICD-10-PCS | Mod: CPTII,S$GLB,, | Performed by: PHYSICIAN ASSISTANT

## 2022-10-31 PROCEDURE — 99999 PR PBB SHADOW E&M-EST. PATIENT-LVL III: CPT | Mod: PBBFAC,,, | Performed by: PHYSICIAN ASSISTANT

## 2022-10-31 PROCEDURE — 1159F MED LIST DOCD IN RCRD: CPT | Mod: CPTII,S$GLB,, | Performed by: PHYSICIAN ASSISTANT

## 2022-10-31 PROCEDURE — 3075F PR MOST RECENT SYSTOLIC BLOOD PRESS GE 130-139MM HG: ICD-10-PCS | Mod: CPTII,S$GLB,, | Performed by: PHYSICIAN ASSISTANT

## 2022-10-31 PROCEDURE — 1100F PR PT FALLS ASSESS DOC 2+ FALLS/FALL W/INJURY/YR: ICD-10-PCS | Mod: CPTII,S$GLB,, | Performed by: PHYSICIAN ASSISTANT

## 2022-10-31 PROCEDURE — 1126F PR PAIN SEVERITY QUANTIFIED, NO PAIN PRESENT: ICD-10-PCS | Mod: CPTII,S$GLB,, | Performed by: PHYSICIAN ASSISTANT

## 2022-10-31 PROCEDURE — 1160F PR REVIEW ALL MEDS BY PRESCRIBER/CLIN PHARMACIST DOCUMENTED: ICD-10-PCS | Mod: CPTII,S$GLB,, | Performed by: PHYSICIAN ASSISTANT

## 2022-10-31 PROCEDURE — 99215 OFFICE O/P EST HI 40 MIN: CPT | Mod: S$GLB,,, | Performed by: PHYSICIAN ASSISTANT

## 2022-10-31 PROCEDURE — 3044F HG A1C LEVEL LT 7.0%: CPT | Mod: CPTII,S$GLB,, | Performed by: PHYSICIAN ASSISTANT

## 2022-10-31 PROCEDURE — 4010F ACE/ARB THERAPY RXD/TAKEN: CPT | Mod: CPTII,S$GLB,, | Performed by: PHYSICIAN ASSISTANT

## 2022-10-31 PROCEDURE — 3078F PR MOST RECENT DIASTOLIC BLOOD PRESSURE < 80 MM HG: ICD-10-PCS | Mod: CPTII,S$GLB,, | Performed by: PHYSICIAN ASSISTANT

## 2022-10-31 PROCEDURE — 3044F PR MOST RECENT HEMOGLOBIN A1C LEVEL <7.0%: ICD-10-PCS | Mod: CPTII,S$GLB,, | Performed by: PHYSICIAN ASSISTANT

## 2022-10-31 PROCEDURE — 99499 UNLISTED E&M SERVICE: CPT | Mod: HCNC,S$GLB,, | Performed by: PHYSICIAN ASSISTANT

## 2022-10-31 PROCEDURE — 99215 PR OFFICE/OUTPT VISIT, EST, LEVL V, 40-54 MIN: ICD-10-PCS | Mod: S$GLB,,, | Performed by: PHYSICIAN ASSISTANT

## 2022-10-31 PROCEDURE — 1157F PR ADVANCE CARE PLAN OR EQUIV PRESENT IN MEDICAL RECORD: ICD-10-PCS | Mod: CPTII,S$GLB,, | Performed by: PHYSICIAN ASSISTANT

## 2022-10-31 PROCEDURE — 3288F PR FALLS RISK ASSESSMENT DOCUMENTED: ICD-10-PCS | Mod: CPTII,S$GLB,, | Performed by: PHYSICIAN ASSISTANT

## 2022-10-31 PROCEDURE — 1126F AMNT PAIN NOTED NONE PRSNT: CPT | Mod: CPTII,S$GLB,, | Performed by: PHYSICIAN ASSISTANT

## 2022-10-31 PROCEDURE — 1100F PTFALLS ASSESS-DOCD GE2>/YR: CPT | Mod: CPTII,S$GLB,, | Performed by: PHYSICIAN ASSISTANT

## 2022-10-31 PROCEDURE — 1159F PR MEDICATION LIST DOCUMENTED IN MEDICAL RECORD: ICD-10-PCS | Mod: CPTII,S$GLB,, | Performed by: PHYSICIAN ASSISTANT

## 2022-10-31 PROCEDURE — 3078F DIAST BP <80 MM HG: CPT | Mod: CPTII,S$GLB,, | Performed by: PHYSICIAN ASSISTANT

## 2022-10-31 PROCEDURE — 1157F ADVNC CARE PLAN IN RCRD: CPT | Mod: CPTII,S$GLB,, | Performed by: PHYSICIAN ASSISTANT

## 2022-10-31 PROCEDURE — 3288F FALL RISK ASSESSMENT DOCD: CPT | Mod: CPTII,S$GLB,, | Performed by: PHYSICIAN ASSISTANT

## 2022-10-31 NOTE — PROGRESS NOTES
Name: Manisha Wynne  MRN: [id number]   CSN: [id number]      Date: 10/31/2022    Referring physician:  No referring provider defined for this encounter.    Chief Complaint: tremor    History of Present Illness (HPI): Manisha Wynne is a R handed 72 y.o. female with a medical issues significant for lumbar radiculopathy, cervical myelopathy, HTN, afib s/p pacemaker, HLD, CEDRIC, CKDIII, SNHL, hyperparathyroidism who presents for tremors. Referral from Dr. Nicolette Cheek. Previously following with Dr. Mcduffie for neuropathy. Scheduled for cervical spine surgery next month. She has had tremors for a year or two, feels that they are getting worse. Worse in her R hand. Tremor started in both hands at the same time. Does not notice tremor at rest. Mostly notices tremor whenever she goes to do something, opening a jar, chopping. Notices it whenever she holds a pen, affects her handwriting -- shaky, denies micrographia.   She has not noticed improvement with EtOH. Drops things out of her hands, thinks maybe tremor contributes some to this? She has balance issues, in 2019 she had 4 falls. She had been doing really well/had been very careful. Fell Sept 18th and broke her nose. Fell the other day in her bedroom, slipped on hardwood floor. Denies feeling lightheaded or dizzy. She has a history of neuropathy, unclear etiology and has been following with Dr. Mcduffie. She had genetic testing that was inconclusive (a few genes of unknown significance).   Currently on metoprolol, prescribed by cardiology -- scheduled to see them tomorrow.   Currently takes vyvanse, drinks 1 cup of coffee a day. Tremor worse with anxiety.     Pacemaker is not MRI compatible.     Family History: adopted     Neuroleptic Exposure: abilify in 2014, short course      Nonmotor/Premotor ROS:  Anosmia: normal  Dysarthria/Hypophonia: none   Dysphagia/Sialorrhea: some dysphagia -- has a sensation in her throat, occurs whenever she goes into sleep or out of sleep,  feels like something is stuck in her throat   Depression: yes, on fluoxetine and wellbutrin -- follows with psychiatry   Urinary changes: frequency and urgency   Constipation: struggled with cdiff for a year  Falls: yes as above   Micrographia: none   Sleep issues:  -RBD: none       Review of Systems:   Review of Systems   Constitutional:  Negative for chills, fever and malaise/fatigue.   HENT:  Negative for hearing loss.    Eyes:  Negative for blurred vision and double vision.   Respiratory:  Negative for cough, shortness of breath and stridor.    Cardiovascular:  Negative for chest pain and leg swelling.   Gastrointestinal:  Negative for constipation, diarrhea and nausea.   Genitourinary:  Negative for frequency and urgency.   Musculoskeletal:  Positive for falls.   Skin:  Negative for itching and rash.   Neurological:  Positive for tremors. Negative for dizziness, loss of consciousness and weakness.   Psychiatric/Behavioral:  Negative for hallucinations and memory loss. The patient is nervous/anxious.          Past Medical History: The patient  has a past medical history of Abnormal Pap smear, Atrial fibrillation, AV block, 1st degree (07/25/2012), C. difficile diarrhea, Cataract, Depression (07/24/2012), Facet arthritis of lumbar region (03/31/2015), Falls, Hyperlipidemia, Hypertension (07/24/2012), Neuropathy, Other specified cardiac dysrhythmias(427.89), Sleep apnea, and Syncope (07/24/2012).    Social History: The patient  reports that she quit smoking about 40 years ago. Her smoking use included cigarettes. She has a 3.75 pack-year smoking history. She has quit using smokeless tobacco. She reports current alcohol use. She reports that she does not use drugs.    Family History: Their family history is not on file. She was adopted.    Allergies: Patient has no known allergies.     Meds:   Current Outpatient Medications on File Prior to Visit   Medication Sig Dispense Refill    aspirin (ECOTRIN) 81 MG EC tablet  "Take 81 mg by mouth. 1 Tablet, Delayed Release (E.C.) Oral Every day      buPROPion (WELLBUTRIN XL) 300 MG 24 hr tablet Take 300 mg by mouth once daily.       calcium-vitamin D 600 mg(1,500mg) -200 unit Tab Take 1 tablet by mouth once daily.       cholecalciferol, vitamin D3, 125 mcg (5,000 unit) Tab Take 5,000 Units by mouth once daily.      dicyclomine (BENTYL) 20 mg tablet Take 20 mg by mouth every 6 (six) hours. Takes prn      estradioL (ESTRACE) 0.01 % (0.1 mg/gram) vaginal cream Place 1 g vaginally twice a week. 42.5 g 2    FLUoxetine 40 MG capsule Take 40 mg by mouth once daily.      lisdexamfetamine (VYVANSE) 70 MG capsule Take 1 capsule (70 mg total) by mouth every morning. 30 capsule 0    losartan (COZAAR) 25 MG tablet TAKE 1 TABLET EVERY DAY 90 tablet 6    metoprolol tartrate (LOPRESSOR) 25 MG tablet TAKE 1 TABLET TWICE DAILY 180 tablet 3    pravastatin (PRAVACHOL) 20 MG tablet TAKE 1 TABLET EVERY DAY 90 tablet 3    PROLIA 60 mg/mL Syrg every 6 (six) months.      gabapentin (NEURONTIN) 100 MG capsule Take 100 mg by mouth 3 (three) times daily.       Current Facility-Administered Medications on File Prior to Visit   Medication Dose Route Frequency Provider Last Rate Last Admin    vancomycin in dextrose 5 % 1 gram/250 mL IVPB 1,000 mg  1,000 mg Intravenous On Call Procedure Kina Kam NP           Exam:  /78 (BP Location: Right arm, Patient Position: Sitting, BP Method: Medium (Automatic))   Pulse 63   Ht 5' 8" (1.727 m)   Wt 72.3 kg (159 lb 6.3 oz)   LMP 10/01/2003   BMI 24.24 kg/m²     Constitutional  Well-developed, well-nourished, appears stated age   Ophthalmoscopic  No papilledema with no hemorrhages or exudates bilaterally   Cardiovascular  Radial pulses 2+ and symmetric, no LE edema bilaterally   Neurological    * Mental status  MOCA =      - Orientation  Oriented to person, place, time, and situation     - Memory   Intact recent and remote     - Attention/concentration  Attentive, " vigilant during exam     - Language  Naming & repetition intact, +2-step commands     - Fund of knowledge  Aware of current events     - Executive  Well-organized thoughts     - Other     * Cranial nerves       - CN II  PERRL, visual fields full to confrontation     - CN III, IV, VI  Extraocular movements full, normal pursuits and saccades     - CN V  Sensation V1 - V3 intact     - CN VII  Face strong and symmetric bilaterally     - CN VIII  Hearing intact bilaterally     - CN IX, X  Palate raises midline and symmetric     - CN XI  SCM and trapezius 5/5 bilaterally     - CN XII  Tongue midline   * Motor  Muscle bulk normal, strength 5/5 throughout   * Sensory   Intact to light touch   * Coordination  No dysmetria with finger-to-nose or heel-to-shin   * Gait  See below.   * Deep tendon reflexes  3+ and slightly more brisk on the L    Upgoing toes bilaterally   Maria absent on exam today   1-2 beats clonus bilaterally    * Specialized movement exam  No hypophonic speech.    No facial masking, pleasant and appropriately animated    No cogwheel rigidity, increased tone of the LUE however a lot of difficulty relaxing    L bradykinesia with finger taps, toe taps, heel taps and JENY.    Bilateral postural and action tremor R > L    No other dystonia, chorea, athetosis, myoclonus, or tics.   No motor impersistence.   Normal-based gait, cautious gait    No shortened stride length.      Laboratory/Radiological:  - Results:  Lab Visit on 10/27/2022   Component Date Value Ref Range Status    Sodium 10/27/2022 140  136 - 145 mmol/L Final    Potassium 10/27/2022 4.4  3.5 - 5.1 mmol/L Final    Chloride 10/27/2022 106  95 - 110 mmol/L Final    CO2 10/27/2022 28  23 - 29 mmol/L Final    Glucose 10/27/2022 100  70 - 110 mg/dL Final    BUN 10/27/2022 21  8 - 23 mg/dL Final    Creatinine 10/27/2022 1.0  0.5 - 1.4 mg/dL Final    Calcium 10/27/2022 9.9  8.7 - 10.5 mg/dL Final    Total Protein 10/27/2022 7.1  6.0 - 8.4 g/dL Final     Albumin 10/27/2022 3.9  3.5 - 5.2 g/dL Final    Total Bilirubin 10/27/2022 1.2 (H)  0.1 - 1.0 mg/dL Final    Alkaline Phosphatase 10/27/2022 57  55 - 135 U/L Final    AST 10/27/2022 26  10 - 40 U/L Final    ALT 10/27/2022 30  10 - 44 U/L Final    Anion Gap 10/27/2022 6 (L)  8 - 16 mmol/L Final    eGFR 10/27/2022 59.1 (A)  >60 mL/min/1.73 m^2 Final    TSH 10/27/2022 3.152  0.400 - 4.000 uIU/mL Final    Cholesterol 10/27/2022 201 (H)  120 - 199 mg/dL Final    Triglycerides 10/27/2022 79  30 - 150 mg/dL Final    HDL 10/27/2022 80 (H)  40 - 75 mg/dL Final    LDL Cholesterol 10/27/2022 105.2  63.0 - 159.0 mg/dL Final    HDL/Cholesterol Ratio 10/27/2022 39.8  20.0 - 50.0 % Final    Total Cholesterol/HDL Ratio 10/27/2022 2.5  2.0 - 5.0 Final    Non-HDL Cholesterol 10/27/2022 121  mg/dL Final   Admission on 10/07/2022, Discharged on 10/07/2022   Component Date Value Ref Range Status    Prothrombin Time 10/07/2022 11.4  9.0 - 12.5 sec Final    INR 10/07/2022 1.1  0.8 - 1.2 Final    aPTT 10/07/2022 27.0  21.0 - 32.0 sec Final   Office Visit on 09/28/2022   Component Date Value Ref Range Status    POC 10 Panel Drug Screen 09/28/2022 Non-Negative (A)  Negative Final     Acceptable 09/28/2022 Yes   Final   Admission on 09/18/2022, Discharged on 09/18/2022   Component Date Value Ref Range Status    HIV 1/2 Ag/Ab 09/18/2022 Non-reactive  Non-reactive Final    Hepatitis C Ab 09/18/2022 Non-reactive  Non-reactive Final    WBC 09/18/2022 10.53  3.90 - 12.70 K/uL Final    RBC 09/18/2022 3.61 (L)  4.00 - 5.40 M/uL Final    Hemoglobin 09/18/2022 11.2 (L)  12.0 - 16.0 g/dL Final    Hematocrit 09/18/2022 32.5 (L)  37.0 - 48.5 % Final    MCV 09/18/2022 90  82 - 98 fL Final    MCH 09/18/2022 31.0  27.0 - 31.0 pg Final    MCHC 09/18/2022 34.5  32.0 - 36.0 g/dL Final    RDW 09/18/2022 12.9  11.5 - 14.5 % Final    Platelets 09/18/2022 145 (L)  150 - 450 K/uL Final    MPV 09/18/2022 10.4  9.2 - 12.9 fL Final    Immature  Granulocytes 09/18/2022 0.5  0.0 - 0.5 % Final    Gran # (ANC) 09/18/2022 7.2  1.8 - 7.7 K/uL Final    Immature Grans (Abs) 09/18/2022 0.05 (H)  0.00 - 0.04 K/uL Final    Lymph # 09/18/2022 1.9  1.0 - 4.8 K/uL Final    Mono # 09/18/2022 1.1 (H)  0.3 - 1.0 K/uL Final    Eos # 09/18/2022 0.3  0.0 - 0.5 K/uL Final    Baso # 09/18/2022 0.03  0.00 - 0.20 K/uL Final    nRBC 09/18/2022 0  0 /100 WBC Final    Gran % 09/18/2022 68.1  38.0 - 73.0 % Final    Lymph % 09/18/2022 17.9 (L)  18.0 - 48.0 % Final    Mono % 09/18/2022 10.4  4.0 - 15.0 % Final    Eosinophil % 09/18/2022 2.8  0.0 - 8.0 % Final    Basophil % 09/18/2022 0.3  0.0 - 1.9 % Final    Differential Method 09/18/2022 Automated   Final    Sodium 09/18/2022 135 (L)  136 - 145 mmol/L Final    Potassium 09/18/2022 4.0  3.5 - 5.1 mmol/L Final    Chloride 09/18/2022 105  95 - 110 mmol/L Final    CO2 09/18/2022 22 (L)  23 - 29 mmol/L Final    Glucose 09/18/2022 91  70 - 110 mg/dL Final    BUN 09/18/2022 23  8 - 23 mg/dL Final    Creatinine 09/18/2022 1.0  0.5 - 1.4 mg/dL Final    Calcium 09/18/2022 9.6  8.7 - 10.5 mg/dL Final    Total Protein 09/18/2022 6.8  6.0 - 8.4 g/dL Final    Albumin 09/18/2022 3.5  3.5 - 5.2 g/dL Final    Total Bilirubin 09/18/2022 1.0  0.1 - 1.0 mg/dL Final    Alkaline Phosphatase 09/18/2022 93  55 - 135 U/L Final    AST 09/18/2022 119 (H)  10 - 40 U/L Final    ALT 09/18/2022 127 (H)  10 - 44 U/L Final    Anion Gap 09/18/2022 8  8 - 16 mmol/L Final    eGFR 09/18/2022 59.1 (A)  >60 mL/min/1.73 m^2 Final    Specimen UA 09/18/2022 Urine, Clean Catch   Final    Color, UA 09/18/2022 Colorless (A)  Yellow, Straw, Denise Final    Appearance, UA 09/18/2022 Clear  Clear Final    pH, UA 09/18/2022 6.0  5.0 - 8.0 Final    Specific Gravity, UA 09/18/2022 1.010  1.005 - 1.030 Final    Protein, UA 09/18/2022 Negative  Negative Final    Glucose, UA 09/18/2022 Negative  Negative Final    Ketones, UA 09/18/2022 Negative  Negative Final    Bilirubin (UA)  09/18/2022 Negative  Negative Final    Occult Blood UA 09/18/2022 Negative  Negative Final    Nitrite, UA 09/18/2022 Negative  Negative Final    Leukocytes, UA 09/18/2022 Negative  Negative Final    Troponin I 09/18/2022 <0.006  0.000 - 0.026 ng/mL Final    Magnesium 09/18/2022 2.0  1.6 - 2.6 mg/dL Final   Lab Visit on 08/05/2022   Component Date Value Ref Range Status    Sodium 08/05/2022 140  136 - 145 mmol/L Final    Potassium 08/05/2022 5.0  3.5 - 5.1 mmol/L Final    Chloride 08/05/2022 108  95 - 110 mmol/L Final    CO2 08/05/2022 26  23 - 29 mmol/L Final    Glucose 08/05/2022 92  70 - 110 mg/dL Final    BUN 08/05/2022 22  8 - 23 mg/dL Final    Creatinine 08/05/2022 1.0  0.5 - 1.4 mg/dL Final    Calcium 08/05/2022 9.8  8.7 - 10.5 mg/dL Final    Total Protein 08/05/2022 6.5  6.0 - 8.4 g/dL Final    Albumin 08/05/2022 3.6  3.5 - 5.2 g/dL Final    Total Bilirubin 08/05/2022 0.4  0.1 - 1.0 mg/dL Final    Alkaline Phosphatase 08/05/2022 44 (L)  55 - 135 U/L Final    AST 08/05/2022 46 (H)  10 - 40 U/L Final    ALT 08/05/2022 42  10 - 44 U/L Final    Anion Gap 08/05/2022 6 (L)  8 - 16 mmol/L Final    eGFR 08/05/2022 59.5 (A)  >60 mL/min/1.73 m^2 Final    TSH 08/05/2022 2.710  0.400 - 4.000 uIU/mL Final    WBC 08/05/2022 5.67  3.90 - 12.70 K/uL Final    RBC 08/05/2022 3.51 (L)  4.00 - 5.40 M/uL Final    Hemoglobin 08/05/2022 10.7 (L)  12.0 - 16.0 g/dL Final    Hematocrit 08/05/2022 33.0 (L)  37.0 - 48.5 % Final    MCV 08/05/2022 94  82 - 98 fL Final    MCH 08/05/2022 30.5  27.0 - 31.0 pg Final    MCHC 08/05/2022 32.4  32.0 - 36.0 g/dL Final    RDW 08/05/2022 13.6  11.5 - 14.5 % Final    Platelets 08/05/2022 151  150 - 450 K/uL Final    MPV 08/05/2022 11.0  9.2 - 12.9 fL Final    Immature Granulocytes 08/05/2022 0.2  0.0 - 0.5 % Final    Gran # (ANC) 08/05/2022 2.9  1.8 - 7.7 K/uL Final    Immature Grans (Abs) 08/05/2022 0.01  0.00 - 0.04 K/uL Final    Lymph # 08/05/2022 1.6  1.0 - 4.8 K/uL Final    Mono # 08/05/2022 0.8   0.3 - 1.0 K/uL Final    Eos # 08/05/2022 0.3  0.0 - 0.5 K/uL Final    Baso # 08/05/2022 0.04  0.00 - 0.20 K/uL Final    nRBC 08/05/2022 0  0 /100 WBC Final    Gran % 08/05/2022 51.2  38.0 - 73.0 % Final    Lymph % 08/05/2022 28.6  18.0 - 48.0 % Final    Mono % 08/05/2022 13.8  4.0 - 15.0 % Final    Eosinophil % 08/05/2022 5.5  0.0 - 8.0 % Final    Basophil % 08/05/2022 0.7  0.0 - 1.9 % Final    Differential Method 08/05/2022 Automated   Final    Iron 08/05/2022 49  30 - 160 ug/dL Final    Transferrin 08/05/2022 228  200 - 375 mg/dL Final    TIBC 08/05/2022 337  250 - 450 ug/dL Final    Saturated Iron 08/05/2022 15 (L)  20 - 50 % Final    Ferritin 08/05/2022 77  20.0 - 300.0 ng/mL Final    Cholesterol 08/05/2022 176  120 - 199 mg/dL Final    Triglycerides 08/05/2022 78  30 - 150 mg/dL Final    HDL 08/05/2022 66  40 - 75 mg/dL Final    LDL Cholesterol 08/05/2022 94.4  63.0 - 159.0 mg/dL Final    HDL/Cholesterol Ratio 08/05/2022 37.5  20.0 - 50.0 % Final    Total Cholesterol/HDL Ratio 08/05/2022 2.7  2.0 - 5.0 Final    Non-HDL Cholesterol 08/05/2022 110  mg/dL Final    Hemoglobin A1C 08/05/2022 5.3  4.0 - 5.6 % Final    Estimated Avg Glucose 08/05/2022 105  68 - 131 mg/dL Final    Vit D, 25-Hydroxy 08/05/2022 44  30 - 96 ng/mL Final       - Independent review of images:      - Independent review of consultant's notes: Froy Cheek     ASSESSMENT/PLAN:  Tremor   - ET vs enhanced physiologic tremor   - scheduled to see cardiology tomorrow -- currently on metoprolol, patient plans to discuss switch to propranolol with cardiology for cardiac, tremor and anxiety benefit   - tsh recently checked WNL      2. Cervical myelopathy   - following with Dr. Nicolette Cheek, scheduled for surgery next month     3. Idiopathic progressive neuropathy   - previously followed by Dr. Mcduffie  - message sent to neuromuscular team to schedule       4. Imbalance   - multifactorial although I think primary issue is cervical myelopathy   - scheduled for  surgery next month with Dr. Cheek  - reassess in 3 months after surgery                  Follow up: in 3 months with [facility]     Collaborating Physician, Dr. Argueta, was available during today's encounter. Any change to plan along with cosign to appear in the EMR.       Total time spent with the patient: 56 minutes.  44 minutes of face-to-face consultation and 12 minutes of chart review and coordination of care, on the day of the visit. This includes face to face time and non-face to face time preparing to see the patient (eg, review of tests), obtaining and/or reviewing separately obtained history, documenting clinical information in the electronic or other health record, independently interpreting resultsand communicating results to the patient/family/caregiver, or care coordination.         Sunitha Rosario PA-C   [facility]  Department of Neurology  Movement Disorders

## 2022-11-01 ENCOUNTER — OFFICE VISIT (OUTPATIENT)
Dept: CARDIOLOGY | Facility: CLINIC | Age: 72
End: 2022-11-01
Payer: MEDICARE

## 2022-11-01 ENCOUNTER — HOSPITAL ENCOUNTER (OUTPATIENT)
Dept: RADIOLOGY | Facility: OTHER | Age: 72
Discharge: HOME | End: 2022-11-01
Attending: ORTHOPAEDIC SURGERY
Payer: MEDICARE

## 2022-11-01 ENCOUNTER — PATIENT MESSAGE (OUTPATIENT)
Dept: NEUROSURGERY | Facility: CLINIC | Age: 72
End: 2022-11-01
Payer: MEDICARE

## 2022-11-01 ENCOUNTER — OFFICE VISIT (OUTPATIENT)
Dept: ORTHOPEDICS | Facility: CLINIC | Age: 72
End: 2022-11-01
Payer: MEDICARE

## 2022-11-01 VITALS
HEART RATE: 60 BPM | HEIGHT: 68 IN | SYSTOLIC BLOOD PRESSURE: 154 MMHG | DIASTOLIC BLOOD PRESSURE: 65 MMHG | BODY MASS INDEX: 23.74 KG/M2 | WEIGHT: 156.63 LBS

## 2022-11-01 VITALS
HEART RATE: 62 BPM | DIASTOLIC BLOOD PRESSURE: 65 MMHG | BODY MASS INDEX: 23.64 KG/M2 | WEIGHT: 156 LBS | HEIGHT: 68 IN | SYSTOLIC BLOOD PRESSURE: 111 MMHG

## 2022-11-01 DIAGNOSIS — M25.531 RIGHT WRIST PAIN: ICD-10-CM

## 2022-11-01 DIAGNOSIS — I44.0 AV BLOCK, 1ST DEGREE: Chronic | ICD-10-CM

## 2022-11-01 DIAGNOSIS — I45.10 RBBB: ICD-10-CM

## 2022-11-01 DIAGNOSIS — N18.31 STAGE 3A CHRONIC KIDNEY DISEASE: ICD-10-CM

## 2022-11-01 DIAGNOSIS — E04.2 GOITER, NONTOXIC, MULTINODULAR: ICD-10-CM

## 2022-11-01 DIAGNOSIS — M48.061 SPINAL STENOSIS OF LUMBAR REGION, UNSPECIFIED WHETHER NEUROGENIC CLAUDICATION PRESENT: ICD-10-CM

## 2022-11-01 DIAGNOSIS — I48.0 PAROXYSMAL ATRIAL FIBRILLATION: ICD-10-CM

## 2022-11-01 DIAGNOSIS — I10 PRIMARY HYPERTENSION: Primary | Chronic | ICD-10-CM

## 2022-11-01 DIAGNOSIS — G60.8 SENSORY PERIPHERAL NEUROPATHY: ICD-10-CM

## 2022-11-01 DIAGNOSIS — G47.33 OSA (OBSTRUCTIVE SLEEP APNEA): ICD-10-CM

## 2022-11-01 DIAGNOSIS — S52.501A CLOSED FRACTURE OF DISTAL END OF RIGHT RADIUS, UNSPECIFIED FRACTURE MORPHOLOGY, INITIAL ENCOUNTER: Primary | ICD-10-CM

## 2022-11-01 DIAGNOSIS — E78.5 DYSLIPIDEMIA: ICD-10-CM

## 2022-11-01 PROCEDURE — 1160F RVW MEDS BY RX/DR IN RCRD: CPT | Mod: CPTII,S$GLB,, | Performed by: INTERNAL MEDICINE

## 2022-11-01 PROCEDURE — 73110 X-RAY EXAM OF WRIST: CPT | Mod: TC,FY,RT

## 2022-11-01 PROCEDURE — 99999 PR PBB SHADOW E&M-EST. PATIENT-LVL III: ICD-10-PCS | Mod: PBBFAC,,, | Performed by: ORTHOPAEDIC SURGERY

## 2022-11-01 PROCEDURE — 99213 PR OFFICE/OUTPT VISIT, EST, LEVL III, 20-29 MIN: ICD-10-PCS | Mod: S$GLB,,, | Performed by: ORTHOPAEDIC SURGERY

## 2022-11-01 PROCEDURE — 1157F ADVNC CARE PLAN IN RCRD: CPT | Mod: CPTII,S$GLB,, | Performed by: INTERNAL MEDICINE

## 2022-11-01 PROCEDURE — 3288F FALL RISK ASSESSMENT DOCD: CPT | Mod: CPTII,S$GLB,, | Performed by: INTERNAL MEDICINE

## 2022-11-01 PROCEDURE — 3077F PR MOST RECENT SYSTOLIC BLOOD PRESSURE >= 140 MM HG: ICD-10-PCS | Mod: CPTII,S$GLB,, | Performed by: INTERNAL MEDICINE

## 2022-11-01 PROCEDURE — 99999 PR PBB SHADOW E&M-EST. PATIENT-LVL IV: ICD-10-PCS | Mod: PBBFAC,,, | Performed by: INTERNAL MEDICINE

## 2022-11-01 PROCEDURE — 1126F AMNT PAIN NOTED NONE PRSNT: CPT | Mod: CPTII,S$GLB,, | Performed by: INTERNAL MEDICINE

## 2022-11-01 PROCEDURE — 1159F MED LIST DOCD IN RCRD: CPT | Mod: CPTII,S$GLB,, | Performed by: INTERNAL MEDICINE

## 2022-11-01 PROCEDURE — 73110 XR WRIST COMPLETE 3 VIEWS RIGHT: ICD-10-PCS | Mod: 26,RT,, | Performed by: RADIOLOGY

## 2022-11-01 PROCEDURE — 1157F PR ADVANCE CARE PLAN OR EQUIV PRESENT IN MEDICAL RECORD: ICD-10-PCS | Mod: CPTII,S$GLB,, | Performed by: INTERNAL MEDICINE

## 2022-11-01 PROCEDURE — 3077F SYST BP >= 140 MM HG: CPT | Mod: CPTII,S$GLB,, | Performed by: INTERNAL MEDICINE

## 2022-11-01 PROCEDURE — 99214 OFFICE O/P EST MOD 30 MIN: CPT | Mod: S$GLB,,, | Performed by: INTERNAL MEDICINE

## 2022-11-01 PROCEDURE — 3078F DIAST BP <80 MM HG: CPT | Mod: CPTII,S$GLB,, | Performed by: INTERNAL MEDICINE

## 2022-11-01 PROCEDURE — 99999 PR PBB SHADOW E&M-EST. PATIENT-LVL III: CPT | Mod: PBBFAC,,, | Performed by: ORTHOPAEDIC SURGERY

## 2022-11-01 PROCEDURE — 3008F PR BODY MASS INDEX (BMI) DOCUMENTED: ICD-10-PCS | Mod: CPTII,S$GLB,, | Performed by: INTERNAL MEDICINE

## 2022-11-01 PROCEDURE — 99499 UNLISTED E&M SERVICE: CPT | Mod: S$GLB,,, | Performed by: INTERNAL MEDICINE

## 2022-11-01 PROCEDURE — 1126F PR PAIN SEVERITY QUANTIFIED, NO PAIN PRESENT: ICD-10-PCS | Mod: CPTII,S$GLB,, | Performed by: INTERNAL MEDICINE

## 2022-11-01 PROCEDURE — 99213 OFFICE O/P EST LOW 20 MIN: CPT | Mod: S$GLB,,, | Performed by: ORTHOPAEDIC SURGERY

## 2022-11-01 PROCEDURE — 3044F PR MOST RECENT HEMOGLOBIN A1C LEVEL <7.0%: ICD-10-PCS | Mod: CPTII,S$GLB,, | Performed by: INTERNAL MEDICINE

## 2022-11-01 PROCEDURE — 3078F PR MOST RECENT DIASTOLIC BLOOD PRESSURE < 80 MM HG: ICD-10-PCS | Mod: CPTII,S$GLB,, | Performed by: INTERNAL MEDICINE

## 2022-11-01 PROCEDURE — 1159F PR MEDICATION LIST DOCUMENTED IN MEDICAL RECORD: ICD-10-PCS | Mod: CPTII,S$GLB,, | Performed by: INTERNAL MEDICINE

## 2022-11-01 PROCEDURE — 4010F PR ACE/ARB THEARPY RXD/TAKEN: ICD-10-PCS | Mod: CPTII,S$GLB,, | Performed by: INTERNAL MEDICINE

## 2022-11-01 PROCEDURE — 73110 X-RAY EXAM OF WRIST: CPT | Mod: 26,RT,, | Performed by: RADIOLOGY

## 2022-11-01 PROCEDURE — 4010F ACE/ARB THERAPY RXD/TAKEN: CPT | Mod: CPTII,S$GLB,, | Performed by: INTERNAL MEDICINE

## 2022-11-01 PROCEDURE — 1100F PR PT FALLS ASSESS DOC 2+ FALLS/FALL W/INJURY/YR: ICD-10-PCS | Mod: CPTII,S$GLB,, | Performed by: INTERNAL MEDICINE

## 2022-11-01 PROCEDURE — 1160F PR REVIEW ALL MEDS BY PRESCRIBER/CLIN PHARMACIST DOCUMENTED: ICD-10-PCS | Mod: CPTII,S$GLB,, | Performed by: INTERNAL MEDICINE

## 2022-11-01 PROCEDURE — 1100F PTFALLS ASSESS-DOCD GE2>/YR: CPT | Mod: CPTII,S$GLB,, | Performed by: INTERNAL MEDICINE

## 2022-11-01 PROCEDURE — 3044F HG A1C LEVEL LT 7.0%: CPT | Mod: CPTII,S$GLB,, | Performed by: INTERNAL MEDICINE

## 2022-11-01 PROCEDURE — 3288F PR FALLS RISK ASSESSMENT DOCUMENTED: ICD-10-PCS | Mod: CPTII,S$GLB,, | Performed by: INTERNAL MEDICINE

## 2022-11-01 PROCEDURE — 3008F BODY MASS INDEX DOCD: CPT | Mod: CPTII,S$GLB,, | Performed by: INTERNAL MEDICINE

## 2022-11-01 PROCEDURE — 99999 PR PBB SHADOW E&M-EST. PATIENT-LVL IV: CPT | Mod: PBBFAC,,, | Performed by: INTERNAL MEDICINE

## 2022-11-01 PROCEDURE — 99214 PR OFFICE/OUTPT VISIT, EST, LEVL IV, 30-39 MIN: ICD-10-PCS | Mod: S$GLB,,, | Performed by: INTERNAL MEDICINE

## 2022-11-01 NOTE — PROGRESS NOTES
Subjective:   Patient ID:  Manisha Wynne is a 72 y.o. female who presents for follow-up of Annual Exam and Pre-op Exam      HPI: Recent history: taking medications as instructed, no medication side effects noted, no TIA's, no chest pain on exertion, no dyspnea on exertion and no swelling of ankles. She recently underwent pacemaker battery change with no issues.  Patient is scheduled for laminectomy in 2 weeks.  Today BP is mildly elevated. Patient states that at home it is controlled.    She is concerned about her essential tremor and asks about change from Metoprolol to Propranolol.    Lab Results   Component Value Date     10/27/2022    K 4.4 10/27/2022     10/27/2022    CO2 28 10/27/2022    BUN 21 10/27/2022    CREATININE 1.0 10/27/2022     10/27/2022    HGBA1C 5.3 08/05/2022    MG 2.0 09/18/2022    AST 26 10/27/2022    ALT 30 10/27/2022    ALBUMIN 3.9 10/27/2022    PROT 7.1 10/27/2022    BILITOT 1.2 (H) 10/27/2022    WBC 10.53 09/18/2022    HGB 11.2 (L) 09/18/2022    HCT 32.5 (L) 09/18/2022    HCT 33 (L) 07/12/2021    MCV 90 09/18/2022     (L) 09/18/2022    INR 1.1 10/07/2022    TSH 3.152 10/27/2022    CHOL 201 (H) 10/27/2022    HDL 80 (H) 10/27/2022    LDLCALC 105.2 10/27/2022    TRIG 79 10/27/2022       No results found in the last 24 hours.     Review of Systems   Constitutional: Negative.   HENT:  Positive for hearing loss.    Eyes: Negative.    Cardiovascular: Negative.  Negative for chest pain, claudication, dyspnea on exertion, irregular heartbeat, leg swelling, near-syncope, palpitations and syncope.   Respiratory: Negative.  Negative for cough, shortness of breath, snoring and wheezing.    Endocrine: Negative.  Negative for cold intolerance, heat intolerance, polydipsia, polyphagia and polyuria.   Skin: Negative.    Musculoskeletal:  Positive for falls, muscle cramps and neck pain.   Gastrointestinal:  Positive for heartburn.   Genitourinary: Negative.   "  Neurological:  Positive for loss of balance, numbness, tremors and weakness.   Psychiatric/Behavioral:  Positive for depression. The patient is nervous/anxious.      Objective:   Physical Exam  Vitals and nursing note reviewed.   Constitutional:       Appearance: Normal appearance. She is well-developed.      Comments: BP (!) 154/65   Pulse 60   Ht 5' 8" (1.727 m)   Wt 71.1 kg (156 lb 10.2 oz)   LMP 10/01/2003   BMI 23.82 kg/m²      HENT:      Head: Normocephalic.   Eyes:      Pupils: Pupils are equal, round, and reactive to light.   Neck:      Thyroid: No thyromegaly.      Vascular: No carotid bruit.   Cardiovascular:      Rate and Rhythm: Normal rate and regular rhythm.      Pulses: Intact distal pulses.           Carotid pulses are 2+ on the right side and 2+ on the left side.       Radial pulses are 2+ on the right side and 2+ on the left side.        Femoral pulses are 2+ on the right side and 2+ on the left side.       Popliteal pulses are 2+ on the right side and 2+ on the left side.        Dorsalis pedis pulses are 2+ on the right side and 2+ on the left side.        Posterior tibial pulses are 2+ on the right side and 2+ on the left side.      Heart sounds: Normal heart sounds. No murmur heard.    No friction rub. No gallop.   Pulmonary:      Effort: Pulmonary effort is normal. No respiratory distress.      Breath sounds: Normal breath sounds. No wheezing or rales.   Chest:      Chest wall: No tenderness.   Abdominal:      Palpations: Abdomen is soft.   Musculoskeletal:         General: Normal range of motion.      Cervical back: Normal range of motion and neck supple.   Skin:     General: Skin is warm and dry.   Neurological:      Mental Status: She is alert and oriented to person, place, and time.      Comments: tremors         Assessment and Plan:     Problem List Items Addressed This Visit          Cardiology Problems    Hypertension - Primary (Chronic)    AV block, 1st degree (Chronic)    Atrial " fibrillation    RBBB       Other    Dyslipidemia    Lumbar spinal stenosis    CEDRIC (obstructive sleep apnea)    CKD (chronic kidney disease) stage 3, GFR 30-59 ml/min    Sensory peripheral neuropathy    Goiter, nontoxic, multinodular       Patient's Medications   New Prescriptions    No medications on file   Previous Medications    ASPIRIN (ECOTRIN) 81 MG EC TABLET    Take 81 mg by mouth. 1 Tablet, Delayed Release (E.C.) Oral Every day    BUPROPION (WELLBUTRIN XL) 300 MG 24 HR TABLET    Take 300 mg by mouth once daily.     CALCIUM-VITAMIN D 600 MG(1,500MG) -200 UNIT TAB    Take 1 tablet by mouth once daily.     CHOLECALCIFEROL, VITAMIN D3, 125 MCG (5,000 UNIT) TAB    Take 5,000 Units by mouth once daily.    DICYCLOMINE (BENTYL) 20 MG TABLET    Take 20 mg by mouth every 6 (six) hours. Takes prn    ESTRADIOL (ESTRACE) 0.01 % (0.1 MG/GRAM) VAGINAL CREAM    Place 1 g vaginally twice a week.    FLUOXETINE 40 MG CAPSULE    Take 40 mg by mouth once daily.    GABAPENTIN (NEURONTIN) 100 MG CAPSULE    Take 100 mg by mouth 3 (three) times daily.    LISDEXAMFETAMINE (VYVANSE) 70 MG CAPSULE    Take 1 capsule (70 mg total) by mouth every morning.    LOSARTAN (COZAAR) 25 MG TABLET    TAKE 1 TABLET EVERY DAY    METOPROLOL TARTRATE (LOPRESSOR) 25 MG TABLET    TAKE 1 TABLET TWICE DAILY    PRAVASTATIN (PRAVACHOL) 20 MG TABLET    TAKE 1 TABLET EVERY DAY    PROLIA 60 MG/ML SYRG    every 6 (six) months.   Modified Medications    No medications on file   Discontinued Medications    No medications on file     Pt has no active cardiac condition (ACS/USA, decompenstated CHF, significant arrhythmias or severe valvular disease) and can easily achieve 4 METS.  Pt does not require any further workup prior to undergoing laminectomy/surgery. ASA can be held as needed (5-7 days prior) but should be restarted as soon as possible after surgery is completed.  Pt should remain on beta-blockers throughout the entire slim-operative time period. The other  cardiac meds can be held at Surgerys discretion but should be restarted as soon as safely possible after surgery.  These recommendations follow the most current Guideline on Perioperative Cardiovascular Evaluation and Management of Patients Undergoing Noncardiac Surgery released by the ACC/AHA. (JACC 2014.07.944).    She will address beta blockers with her neurologist after the surgery. Until then I would not recommend any changes in beta blockers.        Follow up in about 1 year (around 11/1/2023).

## 2022-11-02 ENCOUNTER — TELEPHONE (OUTPATIENT)
Dept: NEUROSURGERY | Facility: CLINIC | Age: 72
End: 2022-11-02
Payer: MEDICARE

## 2022-11-02 DIAGNOSIS — M47.12 CERVICAL SPONDYLOSIS WITH MYELOPATHY: ICD-10-CM

## 2022-11-02 DIAGNOSIS — G95.9 CERVICAL MYELOPATHY: ICD-10-CM

## 2022-11-02 DIAGNOSIS — M48.02 SPINAL STENOSIS, CERVICAL REGION: Primary | ICD-10-CM

## 2022-11-04 ENCOUNTER — TELEPHONE (OUTPATIENT)
Dept: NEUROLOGY | Facility: CLINIC | Age: 72
End: 2022-11-04
Payer: MEDICARE

## 2022-11-04 NOTE — TELEPHONE ENCOUNTER
----- Message from Irina Hartman MA sent at 11/4/2022  5:13 PM CDT -----  Hi! Please advise on which provider pt should be scheduled with. Thanks!   ----- Message -----  From: Sunitha Rosario PA-C  Sent: 10/31/2022  10:33 AM CDT  To: Maikel Henderson Staff, Rayne Huynh Staff    Previous Dr. Mcduffie patient, needs an appointment with neuromuscular.

## 2022-11-07 ENCOUNTER — PATIENT MESSAGE (OUTPATIENT)
Dept: SURGERY | Facility: HOSPITAL | Age: 72
End: 2022-11-07
Payer: MEDICARE

## 2022-11-08 ENCOUNTER — OFFICE VISIT (OUTPATIENT)
Dept: INTERNAL MEDICINE | Facility: CLINIC | Age: 72
End: 2022-11-08
Payer: MEDICARE

## 2022-11-08 ENCOUNTER — LAB VISIT (OUTPATIENT)
Dept: LAB | Facility: HOSPITAL | Age: 72
End: 2022-11-08
Attending: INTERNAL MEDICINE
Payer: MEDICARE

## 2022-11-08 VITALS
OXYGEN SATURATION: 99 % | WEIGHT: 159.81 LBS | HEIGHT: 68 IN | DIASTOLIC BLOOD PRESSURE: 70 MMHG | HEART RATE: 83 BPM | SYSTOLIC BLOOD PRESSURE: 134 MMHG | BODY MASS INDEX: 24.22 KG/M2

## 2022-11-08 DIAGNOSIS — F32.A DEPRESSION, UNSPECIFIED DEPRESSION TYPE: ICD-10-CM

## 2022-11-08 DIAGNOSIS — I10 PRIMARY HYPERTENSION: Primary | Chronic | ICD-10-CM

## 2022-11-08 DIAGNOSIS — R35.0 URINARY FREQUENCY: ICD-10-CM

## 2022-11-08 DIAGNOSIS — G47.33 OSA (OBSTRUCTIVE SLEEP APNEA): ICD-10-CM

## 2022-11-08 DIAGNOSIS — E21.0 PRIMARY HYPERPARATHYROIDISM: ICD-10-CM

## 2022-11-08 DIAGNOSIS — E04.2 GOITER, NONTOXIC, MULTINODULAR: ICD-10-CM

## 2022-11-08 DIAGNOSIS — E78.5 DYSLIPIDEMIA: ICD-10-CM

## 2022-11-08 DIAGNOSIS — I49.5 SSS (SICK SINUS SYNDROME): Chronic | ICD-10-CM

## 2022-11-08 DIAGNOSIS — Z95.0 CARDIAC PACEMAKER IN SITU: ICD-10-CM

## 2022-11-08 PROCEDURE — 93005 EKG 12-LEAD: ICD-10-PCS | Mod: S$GLB,,, | Performed by: INTERNAL MEDICINE

## 2022-11-08 PROCEDURE — 3008F BODY MASS INDEX DOCD: CPT | Mod: CPTII,S$GLB,, | Performed by: INTERNAL MEDICINE

## 2022-11-08 PROCEDURE — 99499 UNLISTED E&M SERVICE: CPT | Mod: S$GLB,,, | Performed by: INTERNAL MEDICINE

## 2022-11-08 PROCEDURE — 3008F PR BODY MASS INDEX (BMI) DOCUMENTED: ICD-10-PCS | Mod: CPTII,S$GLB,, | Performed by: INTERNAL MEDICINE

## 2022-11-08 PROCEDURE — 1157F PR ADVANCE CARE PLAN OR EQUIV PRESENT IN MEDICAL RECORD: ICD-10-PCS | Mod: CPTII,S$GLB,, | Performed by: INTERNAL MEDICINE

## 2022-11-08 PROCEDURE — 3044F PR MOST RECENT HEMOGLOBIN A1C LEVEL <7.0%: ICD-10-PCS | Mod: CPTII,S$GLB,, | Performed by: INTERNAL MEDICINE

## 2022-11-08 PROCEDURE — 3078F PR MOST RECENT DIASTOLIC BLOOD PRESSURE < 80 MM HG: ICD-10-PCS | Mod: CPTII,S$GLB,, | Performed by: INTERNAL MEDICINE

## 2022-11-08 PROCEDURE — 93010 ELECTROCARDIOGRAM REPORT: CPT | Mod: S$GLB,,, | Performed by: INTERNAL MEDICINE

## 2022-11-08 PROCEDURE — 99214 PR OFFICE/OUTPT VISIT, EST, LEVL IV, 30-39 MIN: ICD-10-PCS | Mod: S$GLB,,, | Performed by: INTERNAL MEDICINE

## 2022-11-08 PROCEDURE — 1126F PR PAIN SEVERITY QUANTIFIED, NO PAIN PRESENT: ICD-10-PCS | Mod: CPTII,S$GLB,, | Performed by: INTERNAL MEDICINE

## 2022-11-08 PROCEDURE — 3288F FALL RISK ASSESSMENT DOCD: CPT | Mod: CPTII,S$GLB,, | Performed by: INTERNAL MEDICINE

## 2022-11-08 PROCEDURE — 3044F HG A1C LEVEL LT 7.0%: CPT | Mod: CPTII,S$GLB,, | Performed by: INTERNAL MEDICINE

## 2022-11-08 PROCEDURE — 3078F DIAST BP <80 MM HG: CPT | Mod: CPTII,S$GLB,, | Performed by: INTERNAL MEDICINE

## 2022-11-08 PROCEDURE — 3075F PR MOST RECENT SYSTOLIC BLOOD PRESS GE 130-139MM HG: ICD-10-PCS | Mod: CPTII,S$GLB,, | Performed by: INTERNAL MEDICINE

## 2022-11-08 PROCEDURE — 3288F PR FALLS RISK ASSESSMENT DOCUMENTED: ICD-10-PCS | Mod: CPTII,S$GLB,, | Performed by: INTERNAL MEDICINE

## 2022-11-08 PROCEDURE — 99999 PR PBB SHADOW E&M-EST. PATIENT-LVL IV: CPT | Mod: PBBFAC,,, | Performed by: INTERNAL MEDICINE

## 2022-11-08 PROCEDURE — 99214 OFFICE O/P EST MOD 30 MIN: CPT | Mod: S$GLB,,, | Performed by: INTERNAL MEDICINE

## 2022-11-08 PROCEDURE — 1126F AMNT PAIN NOTED NONE PRSNT: CPT | Mod: CPTII,S$GLB,, | Performed by: INTERNAL MEDICINE

## 2022-11-08 PROCEDURE — 1101F PR PT FALLS ASSESS DOC 0-1 FALLS W/OUT INJ PAST YR: ICD-10-PCS | Mod: CPTII,S$GLB,, | Performed by: INTERNAL MEDICINE

## 2022-11-08 PROCEDURE — 4010F PR ACE/ARB THEARPY RXD/TAKEN: ICD-10-PCS | Mod: CPTII,S$GLB,, | Performed by: INTERNAL MEDICINE

## 2022-11-08 PROCEDURE — 93010 EKG 12-LEAD: ICD-10-PCS | Mod: S$GLB,,, | Performed by: INTERNAL MEDICINE

## 2022-11-08 PROCEDURE — 87086 URINE CULTURE/COLONY COUNT: CPT | Performed by: INTERNAL MEDICINE

## 2022-11-08 PROCEDURE — 93005 ELECTROCARDIOGRAM TRACING: CPT | Mod: S$GLB,,, | Performed by: INTERNAL MEDICINE

## 2022-11-08 PROCEDURE — 1101F PT FALLS ASSESS-DOCD LE1/YR: CPT | Mod: CPTII,S$GLB,, | Performed by: INTERNAL MEDICINE

## 2022-11-08 PROCEDURE — 1159F PR MEDICATION LIST DOCUMENTED IN MEDICAL RECORD: ICD-10-PCS | Mod: CPTII,S$GLB,, | Performed by: INTERNAL MEDICINE

## 2022-11-08 PROCEDURE — 1159F MED LIST DOCD IN RCRD: CPT | Mod: CPTII,S$GLB,, | Performed by: INTERNAL MEDICINE

## 2022-11-08 PROCEDURE — 99999 PR PBB SHADOW E&M-EST. PATIENT-LVL IV: ICD-10-PCS | Mod: PBBFAC,,, | Performed by: INTERNAL MEDICINE

## 2022-11-08 PROCEDURE — 81003 URINALYSIS AUTO W/O SCOPE: CPT | Performed by: INTERNAL MEDICINE

## 2022-11-08 PROCEDURE — 4010F ACE/ARB THERAPY RXD/TAKEN: CPT | Mod: CPTII,S$GLB,, | Performed by: INTERNAL MEDICINE

## 2022-11-08 PROCEDURE — 3075F SYST BP GE 130 - 139MM HG: CPT | Mod: CPTII,S$GLB,, | Performed by: INTERNAL MEDICINE

## 2022-11-08 PROCEDURE — 1157F ADVNC CARE PLAN IN RCRD: CPT | Mod: CPTII,S$GLB,, | Performed by: INTERNAL MEDICINE

## 2022-11-08 NOTE — PROGRESS NOTES
Subjective:       Patient ID: Manisha Wynne is a 72 y.o. female.    Chief Complaint: Pre-op Exam    HPIPt having cervical spine surgery Nov 14, 2022. She is feeling well today.  No CP or SOB.  Pt denies any problems with anesthesia in the past. Pt reports no history of bleeding diathesis and no significant steroids in the last year.    Review of Systems   Respiratory:  Negative for shortness of breath (PND or orthopnea).    Cardiovascular:  Negative for chest pain (arm pain or jaw pain).   Gastrointestinal:  Negative for abdominal pain, diarrhea, nausea and vomiting.   Genitourinary:  Negative for dysuria.   Neurological:  Negative for seizures, syncope and headaches.     Objective:      Physical Exam  Constitutional:       General: She is not in acute distress.     Appearance: She is well-developed.   HENT:      Head: Normocephalic.   Eyes:      Pupils: Pupils are equal, round, and reactive to light.   Neck:      Thyroid: No thyromegaly.      Vascular: No JVD.   Cardiovascular:      Rate and Rhythm: Normal rate and regular rhythm.      Heart sounds: Normal heart sounds. No murmur heard.    No friction rub. No gallop.   Pulmonary:      Effort: Pulmonary effort is normal.      Breath sounds: Normal breath sounds. No wheezing or rales.   Abdominal:      General: Bowel sounds are normal. There is no distension.      Palpations: Abdomen is soft. There is no mass.      Tenderness: There is no abdominal tenderness. There is no guarding or rebound.   Musculoskeletal:      Cervical back: Neck supple.   Lymphadenopathy:      Cervical: No cervical adenopathy.   Skin:     General: Skin is warm and dry.   Neurological:      Mental Status: She is alert and oriented to person, place, and time.      Deep Tendon Reflexes: Reflexes are normal and symmetric.   Psychiatric:         Behavior: Behavior normal.         Thought Content: Thought content normal.         Judgment: Judgment normal.       Assessment:       1. Primary  hypertension    2. Urinary frequency          Plan:   Primary hypertension  -     EKG 12-lead  -     CBC Auto Differential; Future; Expected date: 11/08/2022  Controlled - continue current meds perioperatively  Metoprolol and losartan  Urinary frequency  -     Urinalysis; Future; Expected date: 11/08/2022  -     Urine culture; Future; Expected date: 11/08/2022    Depression, unspecified depression type  Controlled on bupropion and fluoxetine  SSS (sick sinus syndrome)/Cardiac pacemaker in situ  Stable had pacer replaced recently  Dyslipidemia  Controlled - continue current meds  Pravastatin perioperatively  Goiter, nontoxic, multinodular  Stable very small nodules  Primary hyperparathyroidism  Recent normal calcium  CEDRIC (obstructive sleep apnea)   Has CPAP but not using recently due to recent nasal fracture    CBC  - stable anemia 11.2/32.5  CMP WNL  PT/PTT WNL  TSH WNL  CXR WNL July 2022  EKG - atrial,paced rhythm, RBBB  U/A C&S - no infection  Please see Cardiology clearance 11/1/22    Ok to hold asa for 5-7 days    Continue metoprolol periop    Pt has pacemaker please be aware    Pt is at acceptable risk for anesthesia and surgery , all medical problems have been maximally optimized and she is at low to moderate risk for cardio-pulmonary complications.

## 2022-11-09 ENCOUNTER — HOSPITAL ENCOUNTER (OUTPATIENT)
Dept: RADIOLOGY | Facility: OTHER | Age: 72
Discharge: HOME | End: 2022-11-09
Attending: INTERNAL MEDICINE
Payer: MEDICARE

## 2022-11-09 DIAGNOSIS — Z12.31 SCREENING MAMMOGRAM, ENCOUNTER FOR: ICD-10-CM

## 2022-11-09 LAB — BACTERIA UR CULT: NORMAL

## 2022-11-09 PROCEDURE — 77067 SCR MAMMO BI INCL CAD: CPT | Mod: TC

## 2022-11-09 PROCEDURE — 77067 MAMMO DIGITAL SCREENING BILAT WITH TOMO: ICD-10-PCS | Mod: 26,,, | Performed by: RADIOLOGY

## 2022-11-09 PROCEDURE — 77063 MAMMO DIGITAL SCREENING BILAT WITH TOMO: ICD-10-PCS | Mod: 26,,, | Performed by: RADIOLOGY

## 2022-11-09 PROCEDURE — 77067 SCR MAMMO BI INCL CAD: CPT | Mod: 26,,, | Performed by: RADIOLOGY

## 2022-11-09 PROCEDURE — 77063 BREAST TOMOSYNTHESIS BI: CPT | Mod: 26,,, | Performed by: RADIOLOGY

## 2022-11-09 PROCEDURE — 77063 BREAST TOMOSYNTHESIS BI: CPT | Mod: TC

## 2022-11-10 ENCOUNTER — IMMUNIZATION (OUTPATIENT)
Dept: PRIMARY CARE CLINIC | Facility: CLINIC | Age: 72
End: 2022-11-10
Payer: MEDICARE

## 2022-11-10 DIAGNOSIS — Z23 NEED FOR VACCINATION: Primary | ICD-10-CM

## 2022-11-10 PROCEDURE — 91313 COVID-19, MRNA, LNP-S, BIVALENT BOOSTER, PF, 50 MCG/0.5 ML: CPT | Mod: PBBFAC | Performed by: INTERNAL MEDICINE

## 2022-11-10 PROCEDURE — 0134A COVID-19, MRNA, LNP-S, BIVALENT BOOSTER, PF, 50 MCG/0.5 ML: CPT | Mod: CV19,PBBFAC | Performed by: INTERNAL MEDICINE

## 2022-11-11 ENCOUNTER — ANESTHESIA EVENT (OUTPATIENT)
Dept: SURGERY | Facility: HOSPITAL | Age: 72
DRG: 472 | End: 2022-11-11
Payer: MEDICARE

## 2022-11-11 ENCOUNTER — PATIENT MESSAGE (OUTPATIENT)
Dept: PREADMISSION TESTING | Facility: HOSPITAL | Age: 72
End: 2022-11-11

## 2022-11-11 ENCOUNTER — TELEPHONE (OUTPATIENT)
Dept: NEUROSURGERY | Facility: CLINIC | Age: 72
End: 2022-11-11
Payer: MEDICARE

## 2022-11-11 ENCOUNTER — HOSPITAL ENCOUNTER (OUTPATIENT)
Dept: PREADMISSION TESTING | Facility: HOSPITAL | Age: 72
Discharge: HOME | DRG: 472 | End: 2022-11-11
Attending: NEUROLOGICAL SURGERY | Admitting: NEUROLOGICAL SURGERY
Payer: MEDICARE

## 2022-11-11 ENCOUNTER — DOCUMENTATION ONLY (OUTPATIENT)
Dept: CARDIOLOGY | Facility: HOSPITAL | Age: 72
End: 2022-11-11
Payer: MEDICARE

## 2022-11-11 VITALS
DIASTOLIC BLOOD PRESSURE: 60 MMHG | TEMPERATURE: 98 F | BODY MASS INDEX: 24.17 KG/M2 | HEART RATE: 61 BPM | HEIGHT: 68 IN | WEIGHT: 159.5 LBS | RESPIRATION RATE: 16 BRPM | OXYGEN SATURATION: 99 % | SYSTOLIC BLOOD PRESSURE: 123 MMHG

## 2022-11-11 NOTE — TELEPHONE ENCOUNTER
PT CAME INTO THE OFFICE AND SIGN CONSENTS FOR SURGERY.  PT INFORMED TO ARRIVE FOR 1030AM FOR 1230 START 2ND FLOOR DOSC. NPO 8HRS BEFORE SURGERY. BATH THE NIGHT BEFORE AND MORNING OF WITH DIAL SOAP OR HIBICLENS  PV

## 2022-11-11 NOTE — ANESTHESIA PREPROCEDURE EVALUATION
11/11/2022  Manisha Wynne is a 72 y.o., female.    Patient recently broke her nose again, ENT requested per patient to avoid placing anything in her nares.    Patient Active Problem List   Diagnosis    Depression    Hypertension    Atrial fibrillation    SSS (sick sinus syndrome)    AV block, 1st degree    Dyslipidemia    Cardiac pacemaker in situ - cleared by Cards 11/1/22    Abnormal Pap smear of cervix    Lumbar spinal stenosis    Lumbar radiculopathy    Facet arthritis of lumbar region    Sacroiliac joint dysfunction    CEDRIC (obstructive sleep apnea)    RBBB    CKD (chronic kidney disease) stage 3, GFR 30-59 ml/min    Primary hyperparathyroidism    Other osteoporosis without current pathological fracture    Atrophic pancreas    Sensory peripheral neuropathy    Nasal deformity    Closed fracture of left distal radius    Wrist pain, left    Decreased  strength of left hand    Range of motion deficit    Decreased pinch strength    Cerumen debris on tympanic membrane of both ears    Sensorineural hearing loss (SNHL) of both ears    Back pain    Cervical myelopathy    Diarrhea    Recurrent Clostridioides difficile diarrhea    Goiter, nontoxic, multinodular    Mixed stress and urge incontinence    Urinary frequency    Vaginal atrophy    Nocturia more than twice per night    History of chronic constipation    At risk for falls    Lack of coordination    Muscle weakness    Elevated liver enzymes    Nuclear sclerotic cataract of left eye     Past Surgical History:   Procedure Laterality Date    APPLICATION OF CARTILAGE GRAFT Bilateral 12/19/2019    Procedure: APPLICATION, CARTILAGE GRAFT AURICULAR JEZ;  Surgeon: Martinez Flores III, MD;  [facility];  Service: ENT;  Laterality: Bilateral;    CARDIAC PACEMAKER PLACEMENT  09/07/2012    [id number]  [id number]    CATARACT EXTRACTION W/  INTRAOCULAR LENS IMPLANT Right 5/16/2022    Procedure: EXTRACTION, CATARACT, WITH IOL INSERTION;  Surgeon: Ines Aguilera MD;  Location: [facility];  Service: Ophthalmology;  Laterality: Right;  Catalys     CATARACT EXTRACTION W/  INTRAOCULAR LENS IMPLANT Left 6/20/2022    Procedure: EXTRACTION, CATARACT, WITH IOL INSERTION;  Surgeon: Ines Aguilera MD;  Location: [facility] OR;  Service: Ophthalmology;  Laterality: Left;  Catalys     DILATION AND CURETTAGE OF UTERUS      Hx of precancerous cells?    ENDOSCOPIC ULTRASOUND OF UPPER GASTROINTESTINAL TRACT N/A 7/5/2019    Procedure: ULTRASOUND, UPPER GI TRACT, ENDOSCOPIC;  Surgeon: Kev Calderon MD;  Location: [facility] ENDO (2ND FLR);  Service: Endoscopy;  Laterality: N/A;  Pacemaker-Adam   appt confirmed-rb    MYELOGRAPHY N/A 5/4/2021    Procedure: Myelogram  CERVICAL, THORACIC AND LUMBAR;  [facility];  Location: [facility] 2ND FLR;  Service: Radiology;  Laterality: N/A;    NASAL SEPTOPLASTY N/A 12/19/2019    Procedure: SEPTOPLASTY, NOSE;  Surgeon: Martinez Flores III, MD;  Location: [facility];  Service: ENT;  Laterality: N/A;  FOLLOW DR SALVATORE KIMBROUGH PROTOCOL    OPEN REDUCTION AND INTERNAL FIXATION (ORIF) OF FRACTURE OF DISTAL RADIUS Left 1/24/2020    Procedure: ORIF, FRACTURE, RADIUS, DISTAL-left;  Surgeon: Ellen Moe MD;  Location: [facility];  Service: Orthopedics;  Laterality: Left;    REPLACEMENT OF PACEMAKER GENERATOR Left 10/7/2022    Procedure: REPLACEMENT, PACEMAKER GENERATOR;  Surgeon: John Sheets MD;  Location: [facility] EP LAB;  Service: Cardiology;  Laterality: Left;  YAS, SSS, AVB, Dual PPM Gen Change,SJM, MAC ,IL, 3 prep,** Adam dcPPM in situ**    SURGICAL REMOVAL OF NASAL TURBINATE Bilateral 12/19/2019    Procedure: EXCISION, NASAL TURBINATE;  Surgeon: Martinez Flores III, MD;  Location: [facility];  Service: ENT;  Laterality: Bilateral;    TONSILLECTOMY      WISDOM TOOTH EXTRACTION        2019 echo   Mild-to-moderate aortic regurgitation.   Mild mitral regurgitation.   Mild tricuspid regurgitation.   Normal central venous pressure (3 mm Hg).   The estimated PA systolic pressure is 27 mm Hg   Normal left ventricular systolic function. The estimated ejection fraction is 60%   Indeterminate left ventricular diastolic function.   Normal right ventricular systolic function.         Pre-op Assessment    I have reviewed the Patient Summary Reports.     I have reviewed the Nursing Notes. I have reviewed the NPO Status.      Review of Systems      Physical Exam  General: Well nourished, Cooperative, Alert and Oriented    Airway:  Mallampati: II   Mouth Opening: Normal  Tongue: Normal  Neck ROM: Extension Decreased    Dental:  Intact    Chest/Lungs:  Clear to auscultation, Normal Respiratory Rate    Heart:  Rate: Normal  Rhythm: Regular Rhythm  Sounds: Normal        Anesthesia Plan  Type of Anesthesia, risks & benefits discussed:    Anesthesia Type: Gen ETT  Intra-op Monitoring Plan: Standard ASA Monitors  Induction:  IV  Airway Plan: Direct and Video  Informed Consent: Informed consent signed with the Patient and all parties understand the risks and agree with anesthesia plan.  All questions answered.   ASA Score: 3  Day of Surgery Review of History & Physical: H&P Update referred to the surgeon/provider.    Ready For Surgery From Anesthesia Perspective.     .

## 2022-11-11 NOTE — PROGRESS NOTES
Pt is scheduled for laminectomy w/spinal fusion of C3-C6 pm 11/14/2022    Patient has been identified as having an implanted cardiac rhythm device (CRD); the implanted device is an [employer] ([employer]) pacemaker     No noted pacer dependency.   Underlying sinus bradycardia in the 50s.      Please place a magnet on pacemaker during entire procedure. No reprogramming is needed in this non-dependent patient.       For additional questions, please contact the Arrhythmia Department at Ext [phone].             -C6

## 2022-11-14 ENCOUNTER — HOSPITAL ENCOUNTER (INPATIENT)
Facility: HOSPITAL | Age: 72
LOS: 9 days | Discharge: NURSING, REHAB OR LONG-TERM CARE | DRG: 472 | End: 2022-11-23
Attending: NEUROLOGICAL SURGERY | Admitting: NEUROLOGICAL SURGERY
Payer: MEDICARE

## 2022-11-14 ENCOUNTER — ANESTHESIA (OUTPATIENT)
Dept: SURGERY | Facility: HOSPITAL | Age: 72
DRG: 472 | End: 2022-11-14
Payer: MEDICARE

## 2022-11-14 ENCOUNTER — DOCUMENTATION ONLY (OUTPATIENT)
Dept: ELECTROPHYSIOLOGY | Facility: CLINIC | Age: 72
End: 2022-11-14
Payer: MEDICARE

## 2022-11-14 DIAGNOSIS — G95.9 CERVICAL MYELOPATHY: ICD-10-CM

## 2022-11-14 LAB
BASOPHILS # BLD AUTO: 0.03 K/UL (ref 0–0.2)
BASOPHILS NFR BLD: 0.4 % (ref 0–1.9)
DIFFERENTIAL METHOD: ABNORMAL
EOSINOPHIL # BLD AUTO: 0.2 K/UL (ref 0–0.5)
EOSINOPHIL NFR BLD: 2.3 % (ref 0–8)
ERYTHROCYTE [DISTWIDTH] IN BLOOD BY AUTOMATED COUNT: 13.4 % (ref 11.5–14.5)
HCT VFR BLD AUTO: 34.7 % (ref 37–48.5)
HGB BLD-MCNC: 11.1 G/DL (ref 12–16)
IMM GRANULOCYTES # BLD AUTO: 0.03 K/UL (ref 0–0.04)
IMM GRANULOCYTES NFR BLD AUTO: 0.4 % (ref 0–0.5)
LYMPHOCYTES # BLD AUTO: 1.8 K/UL (ref 1–4.8)
LYMPHOCYTES NFR BLD: 22.7 % (ref 18–48)
MCH RBC QN AUTO: 29.6 PG (ref 27–31)
MCHC RBC AUTO-ENTMCNC: 32 G/DL (ref 32–36)
MCV RBC AUTO: 93 FL (ref 82–98)
MONOCYTES # BLD AUTO: 0.9 K/UL (ref 0.3–1)
MONOCYTES NFR BLD: 11 % (ref 4–15)
NEUTROPHILS # BLD AUTO: 4.9 K/UL (ref 1.8–7.7)
NEUTROPHILS NFR BLD: 63.2 % (ref 38–73)
NRBC BLD-RTO: 0 /100 WBC
PLATELET # BLD AUTO: 156 K/UL (ref 150–450)
PMV BLD AUTO: 10.9 FL (ref 9.2–12.9)
RBC # BLD AUTO: 3.75 M/UL (ref 4–5.4)
WBC # BLD AUTO: 7.81 K/UL (ref 3.9–12.7)

## 2022-11-14 PROCEDURE — 25000003 PHARM REV CODE 250: Performed by: REGISTERED NURSE

## 2022-11-14 PROCEDURE — 36000711: Performed by: NEUROLOGICAL SURGERY

## 2022-11-14 PROCEDURE — 25000003 PHARM REV CODE 250: Performed by: STUDENT IN AN ORGANIZED HEALTH CARE EDUCATION/TRAINING PROGRAM

## 2022-11-14 PROCEDURE — 22600 PR ARTHRODESIS, POST/POSTLAT, SNGL INTERSPACE, CERVICAL BELOW C2: ICD-10-PCS | Mod: 51,,, | Performed by: NEUROLOGICAL SURGERY

## 2022-11-14 PROCEDURE — 94640 AIRWAY INHALATION TREATMENT: CPT

## 2022-11-14 PROCEDURE — 71000015 HC POSTOP RECOV 1ST HR: Performed by: NEUROLOGICAL SURGERY

## 2022-11-14 PROCEDURE — 11000001 HC ACUTE MED/SURG PRIVATE ROOM

## 2022-11-14 PROCEDURE — 25000242 PHARM REV CODE 250 ALT 637 W/ HCPCS: Performed by: STUDENT IN AN ORGANIZED HEALTH CARE EDUCATION/TRAINING PROGRAM

## 2022-11-14 PROCEDURE — D9220A PRA ANESTHESIA: ICD-10-PCS | Mod: ANES,,, | Performed by: ANESTHESIOLOGY

## 2022-11-14 PROCEDURE — D9220A PRA ANESTHESIA: Mod: ANES,,, | Performed by: ANESTHESIOLOGY

## 2022-11-14 PROCEDURE — 63600175 PHARM REV CODE 636 W HCPCS: Performed by: REGISTERED NURSE

## 2022-11-14 PROCEDURE — 27201423 OPTIME MED/SURG SUP & DEVICES STERILE SUPPLY: Performed by: NEUROLOGICAL SURGERY

## 2022-11-14 PROCEDURE — 36000710: Performed by: NEUROLOGICAL SURGERY

## 2022-11-14 PROCEDURE — 63015 REMOVE SPINE LAMINA >2 CRVCL: CPT | Mod: ,,, | Performed by: NEUROLOGICAL SURGERY

## 2022-11-14 PROCEDURE — 94761 N-INVAS EAR/PLS OXIMETRY MLT: CPT

## 2022-11-14 PROCEDURE — 71000016 HC POSTOP RECOV ADDL HR: Performed by: NEUROLOGICAL SURGERY

## 2022-11-14 PROCEDURE — 63600175 PHARM REV CODE 636 W HCPCS: Performed by: STUDENT IN AN ORGANIZED HEALTH CARE EDUCATION/TRAINING PROGRAM

## 2022-11-14 PROCEDURE — 20936 PR AUTOGRAFT SPINE SURGERY LOCAL FROM SAME INCISION: ICD-10-PCS | Mod: ,,, | Performed by: NEUROLOGICAL SURGERY

## 2022-11-14 PROCEDURE — 63015 PR LAMINECTOMY,>2 SGMT,CERVICAL: ICD-10-PCS | Mod: ,,, | Performed by: NEUROLOGICAL SURGERY

## 2022-11-14 PROCEDURE — 20936 SP BONE AGRFT LOCAL ADD-ON: CPT | Mod: ,,, | Performed by: NEUROLOGICAL SURGERY

## 2022-11-14 PROCEDURE — 63600175 PHARM REV CODE 636 W HCPCS: Performed by: NEUROLOGICAL SURGERY

## 2022-11-14 PROCEDURE — 37000009 HC ANESTHESIA EA ADD 15 MINS: Performed by: NEUROLOGICAL SURGERY

## 2022-11-14 PROCEDURE — 22600 ARTHRD PST TQ 1NTRSPC CRV: CPT | Mod: 51,,, | Performed by: NEUROLOGICAL SURGERY

## 2022-11-14 PROCEDURE — 22614 PR ARTHRODESIS, POST/POSTLAT, SNGL INTERSPACE, EA ADDTL: ICD-10-PCS | Mod: ,,, | Performed by: NEUROLOGICAL SURGERY

## 2022-11-14 PROCEDURE — 63600175 PHARM REV CODE 636 W HCPCS: Performed by: NURSE ANESTHETIST, CERTIFIED REGISTERED

## 2022-11-14 PROCEDURE — 22842 PR POSTERIOR SEGMENTAL INSTRUMENTATION 3-6 VRT SEG: ICD-10-PCS | Mod: ,,, | Performed by: NEUROLOGICAL SURGERY

## 2022-11-14 PROCEDURE — 22842 INSERT SPINE FIXATION DEVICE: CPT | Mod: ,,, | Performed by: NEUROLOGICAL SURGERY

## 2022-11-14 PROCEDURE — 71000033 HC RECOVERY, INTIAL HOUR: Performed by: NEUROLOGICAL SURGERY

## 2022-11-14 PROCEDURE — 25000003 PHARM REV CODE 250: Performed by: NEUROLOGICAL SURGERY

## 2022-11-14 PROCEDURE — 20930 SP BONE ALGRFT MORSEL ADD-ON: CPT | Mod: ,,, | Performed by: NEUROLOGICAL SURGERY

## 2022-11-14 PROCEDURE — 85025 COMPLETE CBC W/AUTO DIFF WBC: CPT | Performed by: NEUROLOGICAL SURGERY

## 2022-11-14 PROCEDURE — 37000008 HC ANESTHESIA 1ST 15 MINUTES: Performed by: NEUROLOGICAL SURGERY

## 2022-11-14 PROCEDURE — C1729 CATH, DRAINAGE: HCPCS | Performed by: NEUROLOGICAL SURGERY

## 2022-11-14 PROCEDURE — 22614 ARTHRD PST TQ 1NTRSPC EA ADD: CPT | Mod: ,,, | Performed by: NEUROLOGICAL SURGERY

## 2022-11-14 PROCEDURE — 20930 PR ALLOGRAFT FOR SPINE SURGERY ONLY MORSELIZED: ICD-10-PCS | Mod: ,,, | Performed by: NEUROLOGICAL SURGERY

## 2022-11-14 PROCEDURE — C1713 ANCHOR/SCREW BN/BN,TIS/BN: HCPCS | Performed by: NEUROLOGICAL SURGERY

## 2022-11-14 PROCEDURE — D9220A PRA ANESTHESIA: ICD-10-PCS | Mod: CRNA,,, | Performed by: REGISTERED NURSE

## 2022-11-14 PROCEDURE — D9220A PRA ANESTHESIA: Mod: CRNA,,, | Performed by: REGISTERED NURSE

## 2022-11-14 PROCEDURE — 27800903 OPTIME MED/SURG SUP & DEVICES OTHER IMPLANTS: Performed by: NEUROLOGICAL SURGERY

## 2022-11-14 DEVICE — SCREW POLYAXIAL CERV 3.8X14MM: Type: IMPLANTABLE DEVICE | Site: SPINE CERVICAL | Status: FUNCTIONAL

## 2022-11-14 DEVICE — IMPLANTABLE DEVICE: Type: IMPLANTABLE DEVICE | Site: SPINE CERVICAL | Status: FUNCTIONAL

## 2022-11-14 DEVICE — SCREW PROFICIENT LOCKING: Type: IMPLANTABLE DEVICE | Site: SPINE CERVICAL | Status: FUNCTIONAL

## 2022-11-14 DEVICE — GRAFT ALTAPORE BIOACTIVE 2.5ML: Type: IMPLANTABLE DEVICE | Site: SPINE CERVICAL | Status: FUNCTIONAL

## 2022-11-14 DEVICE — SCREW POLYAXIAL CERV 3.8X12MM: Type: IMPLANTABLE DEVICE | Site: SPINE CERVICAL | Status: FUNCTIONAL

## 2022-11-14 DEVICE — GRAFT ALTAPORE MED CYL 2.6ML: Type: IMPLANTABLE DEVICE | Site: SPINE CERVICAL | Status: FUNCTIONAL

## 2022-11-14 RX ORDER — ACETAMINOPHEN 500 MG
1000 TABLET ORAL EVERY 8 HOURS
Status: DISCONTINUED | OUTPATIENT
Start: 2022-11-14 | End: 2022-11-23 | Stop reason: HOSPADM

## 2022-11-14 RX ORDER — ONDANSETRON 2 MG/ML
4 INJECTION INTRAMUSCULAR; INTRAVENOUS EVERY 8 HOURS PRN
Status: DISCONTINUED | OUTPATIENT
Start: 2022-11-14 | End: 2022-11-23 | Stop reason: HOSPADM

## 2022-11-14 RX ORDER — CEFAZOLIN SODIUM/WATER 2 G/20 ML
2 SYRINGE (ML) INTRAVENOUS
Status: COMPLETED | OUTPATIENT
Start: 2022-11-14 | End: 2022-11-14

## 2022-11-14 RX ORDER — PROPOFOL 10 MG/ML
INJECTION, EMULSION INTRAVENOUS CONTINUOUS PRN
Status: DISCONTINUED | OUTPATIENT
Start: 2022-11-14 | End: 2022-11-14

## 2022-11-14 RX ORDER — BUPROPION HYDROCHLORIDE 300 MG/1
300 TABLET ORAL DAILY
Status: DISCONTINUED | OUTPATIENT
Start: 2022-11-15 | End: 2022-11-23 | Stop reason: HOSPADM

## 2022-11-14 RX ORDER — KETAMINE HCL IN 0.9 % NACL 50 MG/5 ML
SYRINGE (ML) INTRAVENOUS
Status: DISCONTINUED | OUTPATIENT
Start: 2022-11-14 | End: 2022-11-14

## 2022-11-14 RX ORDER — HYDROMORPHONE HYDROCHLORIDE 1 MG/ML
1 INJECTION, SOLUTION INTRAMUSCULAR; INTRAVENOUS; SUBCUTANEOUS EVERY 6 HOURS PRN
Status: DISCONTINUED | OUTPATIENT
Start: 2022-11-14 | End: 2022-11-16

## 2022-11-14 RX ORDER — CEFAZOLIN SODIUM/D5W 2 G/100 ML
2 PLASTIC BAG, INJECTION (ML) INTRAVENOUS
Status: DISCONTINUED | OUTPATIENT
Start: 2022-11-14 | End: 2022-11-21

## 2022-11-14 RX ORDER — IPRATROPIUM BROMIDE AND ALBUTEROL SULFATE 2.5; .5 MG/3ML; MG/3ML
3 SOLUTION RESPIRATORY (INHALATION)
Status: DISCONTINUED | OUTPATIENT
Start: 2022-11-14 | End: 2022-11-15

## 2022-11-14 RX ORDER — ONDANSETRON 2 MG/ML
4 INJECTION INTRAMUSCULAR; INTRAVENOUS ONCE AS NEEDED
Status: DISCONTINUED | OUTPATIENT
Start: 2022-11-14 | End: 2022-11-14 | Stop reason: HOSPADM

## 2022-11-14 RX ORDER — FENTANYL CITRATE 50 UG/ML
25 INJECTION, SOLUTION INTRAMUSCULAR; INTRAVENOUS EVERY 5 MIN PRN
Status: DISCONTINUED | OUTPATIENT
Start: 2022-11-14 | End: 2022-11-14 | Stop reason: HOSPADM

## 2022-11-14 RX ORDER — HEPARIN SODIUM 5000 [USP'U]/ML
5000 INJECTION, SOLUTION INTRAVENOUS; SUBCUTANEOUS EVERY 8 HOURS
Status: DISCONTINUED | OUTPATIENT
Start: 2022-11-15 | End: 2022-11-23 | Stop reason: HOSPADM

## 2022-11-14 RX ORDER — HYDROMORPHONE HYDROCHLORIDE 2 MG/ML
INJECTION, SOLUTION INTRAMUSCULAR; INTRAVENOUS; SUBCUTANEOUS
Status: DISCONTINUED | OUTPATIENT
Start: 2022-11-14 | End: 2022-11-14

## 2022-11-14 RX ORDER — MUPIROCIN 20 MG/G
OINTMENT TOPICAL
Status: DISCONTINUED | OUTPATIENT
Start: 2022-11-14 | End: 2022-11-14 | Stop reason: HOSPADM

## 2022-11-14 RX ORDER — LISDEXAMFETAMINE DIMESYLATE 70 MG/1
70 CAPSULE ORAL EVERY MORNING
Status: DISCONTINUED | OUTPATIENT
Start: 2022-11-15 | End: 2022-11-15

## 2022-11-14 RX ORDER — LIDOCAINE HYDROCHLORIDE 20 MG/ML
INJECTION INTRAVENOUS
Status: DISCONTINUED | OUTPATIENT
Start: 2022-11-14 | End: 2022-11-14

## 2022-11-14 RX ORDER — PROCHLORPERAZINE EDISYLATE 5 MG/ML
5 INJECTION INTRAMUSCULAR; INTRAVENOUS EVERY 30 MIN PRN
Status: DISCONTINUED | OUTPATIENT
Start: 2022-11-14 | End: 2022-11-14 | Stop reason: HOSPADM

## 2022-11-14 RX ORDER — MUPIROCIN 20 MG/G
1 OINTMENT TOPICAL 2 TIMES DAILY
Status: DISCONTINUED | OUTPATIENT
Start: 2022-11-14 | End: 2022-11-14 | Stop reason: HOSPADM

## 2022-11-14 RX ORDER — MUPIROCIN 20 MG/G
OINTMENT TOPICAL 2 TIMES DAILY
Status: COMPLETED | OUTPATIENT
Start: 2022-11-14 | End: 2022-11-19

## 2022-11-14 RX ORDER — HYDROMORPHONE HYDROCHLORIDE 1 MG/ML
0.2 INJECTION, SOLUTION INTRAMUSCULAR; INTRAVENOUS; SUBCUTANEOUS EVERY 5 MIN PRN
Status: DISCONTINUED | OUTPATIENT
Start: 2022-11-14 | End: 2022-11-14 | Stop reason: HOSPADM

## 2022-11-14 RX ORDER — FLUOXETINE HYDROCHLORIDE 20 MG/1
40 CAPSULE ORAL DAILY
Status: DISCONTINUED | OUTPATIENT
Start: 2022-11-15 | End: 2022-11-23 | Stop reason: HOSPADM

## 2022-11-14 RX ORDER — BUPIVACAINE HYDROCHLORIDE AND EPINEPHRINE 5; 5 MG/ML; UG/ML
INJECTION, SOLUTION EPIDURAL; INTRACAUDAL; PERINEURAL
Status: DISCONTINUED | OUTPATIENT
Start: 2022-11-14 | End: 2022-11-14 | Stop reason: HOSPADM

## 2022-11-14 RX ORDER — BISACODYL 10 MG
10 SUPPOSITORY, RECTAL RECTAL DAILY PRN
Status: DISCONTINUED | OUTPATIENT
Start: 2022-11-14 | End: 2022-11-18

## 2022-11-14 RX ORDER — GABAPENTIN 100 MG/1
100 CAPSULE ORAL 3 TIMES DAILY
Status: DISCONTINUED | OUTPATIENT
Start: 2022-11-14 | End: 2022-11-17

## 2022-11-14 RX ORDER — PHENYLEPHRINE HYDROCHLORIDE 10 MG/ML
INJECTION INTRAVENOUS
Status: DISCONTINUED | OUTPATIENT
Start: 2022-11-14 | End: 2022-11-14

## 2022-11-14 RX ORDER — FENTANYL CITRATE 50 UG/ML
INJECTION, SOLUTION INTRAMUSCULAR; INTRAVENOUS
Status: DISCONTINUED | OUTPATIENT
Start: 2022-11-14 | End: 2022-11-14

## 2022-11-14 RX ORDER — SUCCINYLCHOLINE CHLORIDE 20 MG/ML
INJECTION INTRAMUSCULAR; INTRAVENOUS
Status: DISCONTINUED | OUTPATIENT
Start: 2022-11-14 | End: 2022-11-14

## 2022-11-14 RX ORDER — PRAVASTATIN SODIUM 20 MG/1
20 TABLET ORAL DAILY
Status: DISCONTINUED | OUTPATIENT
Start: 2022-11-15 | End: 2022-11-23 | Stop reason: HOSPADM

## 2022-11-14 RX ORDER — TALC
6 POWDER (GRAM) TOPICAL NIGHTLY PRN
Status: DISCONTINUED | OUTPATIENT
Start: 2022-11-14 | End: 2022-11-23 | Stop reason: HOSPADM

## 2022-11-14 RX ORDER — ONDANSETRON 2 MG/ML
INJECTION INTRAMUSCULAR; INTRAVENOUS
Status: DISCONTINUED | OUTPATIENT
Start: 2022-11-14 | End: 2022-11-14

## 2022-11-14 RX ORDER — ROCURONIUM BROMIDE 10 MG/ML
INJECTION, SOLUTION INTRAVENOUS
Status: DISCONTINUED | OUTPATIENT
Start: 2022-11-14 | End: 2022-11-14

## 2022-11-14 RX ORDER — DEXAMETHASONE SODIUM PHOSPHATE 4 MG/ML
INJECTION, SOLUTION INTRA-ARTICULAR; INTRALESIONAL; INTRAMUSCULAR; INTRAVENOUS; SOFT TISSUE
Status: DISCONTINUED | OUTPATIENT
Start: 2022-11-14 | End: 2022-11-14

## 2022-11-14 RX ORDER — SODIUM CHLORIDE 0.9 % (FLUSH) 0.9 %
10 SYRINGE (ML) INJECTION
Status: DISCONTINUED | OUTPATIENT
Start: 2022-11-14 | End: 2022-11-14 | Stop reason: HOSPADM

## 2022-11-14 RX ORDER — SODIUM CHLORIDE 9 MG/ML
INJECTION, SOLUTION INTRAVENOUS CONTINUOUS
Status: DISCONTINUED | OUTPATIENT
Start: 2022-11-14 | End: 2022-11-19

## 2022-11-14 RX ORDER — LOSARTAN POTASSIUM 25 MG/1
25 TABLET ORAL DAILY
Status: DISCONTINUED | OUTPATIENT
Start: 2022-11-15 | End: 2022-11-18

## 2022-11-14 RX ORDER — AMOXICILLIN 250 MG
2 CAPSULE ORAL DAILY
Status: DISCONTINUED | OUTPATIENT
Start: 2022-11-15 | End: 2022-11-23 | Stop reason: HOSPADM

## 2022-11-14 RX ORDER — PROPOFOL 10 MG/ML
VIAL (ML) INTRAVENOUS
Status: DISCONTINUED | OUTPATIENT
Start: 2022-11-14 | End: 2022-11-14

## 2022-11-14 RX ORDER — OXYCODONE HYDROCHLORIDE 5 MG/1
5 TABLET ORAL EVERY 4 HOURS PRN
Status: DISCONTINUED | OUTPATIENT
Start: 2022-11-14 | End: 2022-11-23 | Stop reason: HOSPADM

## 2022-11-14 RX ORDER — PROCHLORPERAZINE EDISYLATE 5 MG/ML
5 INJECTION INTRAMUSCULAR; INTRAVENOUS EVERY 6 HOURS PRN
Status: DISCONTINUED | OUTPATIENT
Start: 2022-11-14 | End: 2022-11-23 | Stop reason: HOSPADM

## 2022-11-14 RX ORDER — MAG HYDROX/ALUMINUM HYD/SIMETH 200-200-20
30 SUSPENSION, ORAL (FINAL DOSE FORM) ORAL EVERY 4 HOURS PRN
Status: DISCONTINUED | OUTPATIENT
Start: 2022-11-14 | End: 2022-11-23 | Stop reason: HOSPADM

## 2022-11-14 RX ORDER — DICYCLOMINE HYDROCHLORIDE 20 MG/1
20 TABLET ORAL EVERY 6 HOURS
Status: DISCONTINUED | OUTPATIENT
Start: 2022-11-15 | End: 2022-11-23 | Stop reason: HOSPADM

## 2022-11-14 RX ORDER — LIDOCAINE HYDROCHLORIDE 10 MG/ML
1 INJECTION, SOLUTION EPIDURAL; INFILTRATION; INTRACAUDAL; PERINEURAL ONCE AS NEEDED
Status: COMPLETED | OUTPATIENT
Start: 2022-11-14 | End: 2022-11-14

## 2022-11-14 RX ORDER — METOPROLOL TARTRATE 25 MG/1
25 TABLET, FILM COATED ORAL 2 TIMES DAILY
Status: DISCONTINUED | OUTPATIENT
Start: 2022-11-14 | End: 2022-11-23 | Stop reason: HOSPADM

## 2022-11-14 RX ADMIN — PHENYLEPHRINE HYDROCHLORIDE 100 MCG: 10 INJECTION INTRAVENOUS at 03:11

## 2022-11-14 RX ADMIN — DICYCLOMINE HYDROCHLORIDE 20 MG: 20 TABLET ORAL at 11:11

## 2022-11-14 RX ADMIN — DEXAMETHASONE SODIUM PHOSPHATE 4 MG: 4 INJECTION, SOLUTION INTRAMUSCULAR; INTRAVENOUS at 03:11

## 2022-11-14 RX ADMIN — PROPOFOL 50 MG: 10 INJECTION, EMULSION INTRAVENOUS at 06:11

## 2022-11-14 RX ADMIN — Medication 25 MG: at 03:11

## 2022-11-14 RX ADMIN — IPRATROPIUM BROMIDE AND ALBUTEROL SULFATE 3 ML: 2.5; .5 SOLUTION RESPIRATORY (INHALATION) at 08:11

## 2022-11-14 RX ADMIN — ROCURONIUM BROMIDE 5 MG: 10 INJECTION INTRAVENOUS at 02:11

## 2022-11-14 RX ADMIN — FENTANYL CITRATE 25 MCG: 50 INJECTION, SOLUTION INTRAMUSCULAR; INTRAVENOUS at 03:11

## 2022-11-14 RX ADMIN — OXYCODONE 5 MG: 5 TABLET ORAL at 07:11

## 2022-11-14 RX ADMIN — Medication 2 G: at 11:11

## 2022-11-14 RX ADMIN — SODIUM CHLORIDE, SODIUM GLUCONATE, SODIUM ACETATE, POTASSIUM CHLORIDE, MAGNESIUM CHLORIDE, SODIUM PHOSPHATE, DIBASIC, AND POTASSIUM PHOSPHATE: .53; .5; .37; .037; .03; .012; .00082 INJECTION, SOLUTION INTRAVENOUS at 03:11

## 2022-11-14 RX ADMIN — PROPOFOL 160 MG: 10 INJECTION, EMULSION INTRAVENOUS at 02:11

## 2022-11-14 RX ADMIN — ONDANSETRON 4 MG: 2 INJECTION INTRAMUSCULAR; INTRAVENOUS at 06:11

## 2022-11-14 RX ADMIN — PHENYLEPHRINE HYDROCHLORIDE 80 MCG: 10 INJECTION INTRAVENOUS at 06:11

## 2022-11-14 RX ADMIN — LIDOCAINE HYDROCHLORIDE 80 MG: 20 INJECTION INTRAVENOUS at 02:11

## 2022-11-14 RX ADMIN — Medication 2 G: at 03:11

## 2022-11-14 RX ADMIN — HYDROMORPHONE HYDROCHLORIDE 0.2 MG: 2 INJECTION INTRAMUSCULAR; INTRAVENOUS; SUBCUTANEOUS at 04:11

## 2022-11-14 RX ADMIN — FENTANYL CITRATE 25 MCG: 50 INJECTION, SOLUTION INTRAMUSCULAR; INTRAVENOUS at 02:11

## 2022-11-14 RX ADMIN — ACETAMINOPHEN 1000 MG: 325 TABLET ORAL at 10:11

## 2022-11-14 RX ADMIN — PROPOFOL 125 MCG/KG/MIN: 10 INJECTION, EMULSION INTRAVENOUS at 02:11

## 2022-11-14 RX ADMIN — GABAPENTIN 100 MG: 100 CAPSULE ORAL at 08:11

## 2022-11-14 RX ADMIN — HYDROMORPHONE HYDROCHLORIDE 0.2 MG: 1 INJECTION, SOLUTION INTRAMUSCULAR; INTRAVENOUS; SUBCUTANEOUS at 08:11

## 2022-11-14 RX ADMIN — SUCCINYLCHOLINE CHLORIDE 140 MG: 20 INJECTION, SOLUTION INTRAMUSCULAR; INTRAVENOUS at 02:11

## 2022-11-14 RX ADMIN — HYDROMORPHONE HYDROCHLORIDE 0.2 MG: 2 INJECTION INTRAMUSCULAR; INTRAVENOUS; SUBCUTANEOUS at 06:11

## 2022-11-14 RX ADMIN — PHENYLEPHRINE HYDROCHLORIDE 40 MCG: 10 INJECTION INTRAVENOUS at 04:11

## 2022-11-14 RX ADMIN — REMIFENTANIL HYDROCHLORIDE 0.2 MCG/KG/MIN: 1 INJECTION, POWDER, LYOPHILIZED, FOR SOLUTION INTRAVENOUS at 02:11

## 2022-11-14 RX ADMIN — METHOCARBAMOL 1000 MG: 100 INJECTION INTRAMUSCULAR; INTRAVENOUS at 11:11

## 2022-11-14 RX ADMIN — MUPIROCIN: 20 OINTMENT TOPICAL at 12:11

## 2022-11-14 RX ADMIN — MUPIROCIN: 20 OINTMENT TOPICAL at 08:11

## 2022-11-14 RX ADMIN — SODIUM CHLORIDE 0.4 MCG/KG/MIN: 9 INJECTION, SOLUTION INTRAVENOUS at 03:11

## 2022-11-14 RX ADMIN — METOPROLOL TARTRATE 25 MG: 25 TABLET, FILM COATED ORAL at 08:11

## 2022-11-14 RX ADMIN — SODIUM CHLORIDE: 0.9 INJECTION, SOLUTION INTRAVENOUS at 02:11

## 2022-11-14 RX ADMIN — SODIUM CHLORIDE: 0.9 INJECTION, SOLUTION INTRAVENOUS at 11:11

## 2022-11-14 RX ADMIN — ROCURONIUM BROMIDE 25 MG: 10 INJECTION INTRAVENOUS at 03:11

## 2022-11-14 RX ADMIN — LIDOCAINE HYDROCHLORIDE 1 MG: 10 INJECTION, SOLUTION EPIDURAL; INFILTRATION; INTRACAUDAL; PERINEURAL at 11:11

## 2022-11-14 RX ADMIN — FENTANYL CITRATE 50 MCG: 50 INJECTION, SOLUTION INTRAMUSCULAR; INTRAVENOUS at 02:11

## 2022-11-14 RX ADMIN — PROPOFOL 40 MG: 10 INJECTION, EMULSION INTRAVENOUS at 03:11

## 2022-11-14 NOTE — PROGRESS NOTES
Patient has been identified as having an implanted cardiac rhythm device (CRD); the implanted device is a [employer] pacemaker.      Planned procedure: Laminectomy of Cervical spine.    No noted pacer dependency - underlying rhythm on 10/14/22 c/w SB 50's with 1st degree HB.     PACEMAKERS/DEPENDENT ABOVE WAIST     The reported surgical procedure is above the umbilicus.  Per protocol, apply a magnet directly over the implanted device during entire surgical procedure if electrocautery will be used.    For additional questions, please contact the Arrhythmia Department at Ext [phone].

## 2022-11-14 NOTE — H&P
"Neurosurgery  H&P     SUBJECTIVE:         History of Present Illness:  Manisha Wynne is a 71 y.o. female (former  at [employer]) who presents as a referral from Dr. Mcduffie for evaluation of cervical myelopathy. The patient reports that she has had several falls from [id number], which prompted her to seek neurologic evaluation. The falls have resulted in injuries, including breaking her wrist. She also has numbness, tingling, and burning in her feet. She denies pain in her neck, but endorses in her mid- and low back. Her pain is 10/10 in the legs, 10/10 in the back, and 0/10 in the neck and arms. The pain is intermittent. It is made worse by sitting. It improves with standing. She denies any change in bowel/bladder habit.      She endorses handwriting changes, dropping objects, and balance/gait difficulty. She denies fine motor changes. She has not seen pain management for her neck, but she is seeing Dr. Booker for "sciatica."      CT of the cervical spine (the patient has a non-MRI-conditional compatible pacemaker in place) was obtained and personally reviewed, demonstrating significant stenosis at C5-C6>C4-C5. Flex ex shows some mild instability of C3 on C4.      She lives alone with her frentso cats. Her family is in [place] and [facility]. She does have friends nearby who will help to care for her in perioperative periods. She takes ASA 81 daily.      As of 5/6/21, the patient had the myelogram as requested. This is personally reviewed and demonstrates significant cord compression and OPLL. There is some stenosis at L4-L5 as well.      The patient reports that she has been well. She has had no falls. She has been doing PT for her sciatica, which she has found helpful. She does note that her tremor, which may be progressing slightly, is now interfering with her applying makeup at times.      As of 5/25/21, the patient reports that she has not had any significant changes. She presents today with a list of " questions, together with friend Kavya Roberts on the phone. She also endorses a several year history of urinary incontinence (episodes occur overnight or with coughing) for which she was going to participate in pelvic floor rehab and see urogyn; however, this was interrupted by Covid.      As of 1/11/22, the patient reports that she has had ongoing C. Diff since we scheduled surgery. She was seen about a potential colonoscopy today, but was advised against proceeding. She is due for a CT of the abdomen/pelvis next week to look at the pancreas. She is also being considered for a fecal transplant. There is also a clinical trial going on in which she's interested.      Dr. Maradiaga of ID has been quarterbacking the C. Diff response. The patient is frustrated by her lack of improvement.      She is less fatigued now than when she initially became sick, but she does not feel she has yet recovered to her baseline. She is also being recommended for cataract surgery and pacemaker generator replacement in about 13 months.      As of 8/4/22, the patient reports that she is finally over her C. Diff. 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 She notes that she has been really careful. She walks very slowly. She has noticed some tremor in her hands at rest. This gets better when she holds onto an object. She denies anosmia. She denies significant sleep difficulties and other non-motor symptoms of PD.      Review of patient's allergies indicates:  No Known Allergies     No current facility-administered medications on file prior to encounter.     Current Outpatient Medications on File Prior to Encounter   Medication Sig Dispense Refill    buPROPion (WELLBUTRIN XL) 300 MG 24 hr tablet Take 300 mg by mouth once daily.       estradioL (ESTRACE) 0.01 % (0.1 mg/gram) vaginal cream Place 1 g vaginally twice a week. 42.5 g 2    FLUoxetine 40 MG capsule Take 40 mg by mouth once daily.      lisdexamfetamine  (VYVANSE) 70 MG capsule Take 1 capsule (70 mg total) by mouth every morning. 30 capsule 0    losartan (COZAAR) 25 MG tablet TAKE 1 TABLET EVERY DAY 90 tablet 6    metoprolol tartrate (LOPRESSOR) 25 MG tablet TAKE 1 TABLET TWICE DAILY 180 tablet 3    aspirin (ECOTRIN) 81 MG EC tablet Take 81 mg by mouth. 1 Tablet, Delayed Release (E.C.) Oral Every day      calcium-vitamin D 600 mg(1,500mg) -200 unit Tab Take 1 tablet by mouth once daily.       cholecalciferol, vitamin D3, 125 mcg (5,000 unit) Tab Take 5,000 Units by mouth once daily.      dicyclomine (BENTYL) 20 mg tablet Take 20 mg by mouth every 6 (six) hours. Takes prn      gabapentin (NEURONTIN) 100 MG capsule Take 100 mg by mouth 3 (three) times daily.      pravastatin (PRAVACHOL) 20 MG tablet TAKE 1 TABLET EVERY DAY 90 tablet 3    PROLIA 60 mg/mL Syrg every 6 (six) months.                    Past Medical History:   Diagnosis Date    Abnormal Pap smear      Atrial fibrillation      AV block, 1st degree 07/25/2012    C. difficile diarrhea       RESOLVED    Cataract      Depression 07/24/2012    Facet arthritis of lumbar region 03/31/2015    Falls       3 falls in the last 6 mos--noted 6/19/19    Hyperlipidemia      Hypertension 07/24/2012    Neuropathy      Other specified cardiac dysrhythmias(427.89)      Sleep apnea      Syncope 07/24/2012            Past Surgical History:   Procedure Laterality Date    APPLICATION OF CARTILAGE GRAFT Bilateral 12/19/2019     Procedure: APPLICATION, CARTILAGE GRAFT AURICULAR JEZ;  Surgeon: Martinez Flores III, MD;  Location: [facility];  Service: ENT;  Laterality: Bilateral;    CARDIAC PACEMAKER PLACEMENT   09/07/2012     [id number]    CATARACT EXTRACTION W/  INTRAOCULAR LENS IMPLANT Right 5/16/2022     Procedure: EXTRACTION, CATARACT, WITH IOL INSERTION;  Surgeon: Ines Aguilera MD;  Location: [facility];  Service: Ophthalmology;  Laterality: Right;  Catalys     CATARACT EXTRACTION W/  INTRAOCULAR LENS IMPLANT  Left 2022     Procedure: EXTRACTION, CATARACT, WITH IOL INSERTION;  Surgeon: Ines Aguilera MD;  Location: [facility];  Service: Ophthalmology;  Laterality: Left;  Catalys     DILATION AND CURETTAGE OF UTERUS         Hx of precancerous cells?    ENDOSCOPIC ULTRASOUND OF UPPER GASTROINTESTINAL TRACT N/A 2019     Procedure: ULTRASOUND, UPPER GI TRACT, ENDOSCOPIC;  Surgeon: Kev Calderon MD;  Location: [facility] ENDO (2ND FLR);  Service: Endoscopy;  Laterality: N/A;  Pacemaker-Adam   appt confirmed-rb    MYELOGRAPHY N/A 2021     Procedure: Myelogram  CERVICAL, THORACIC AND LUMBAR;  Surgeon: [facility] Diagnostic Provider;  Location: [facility] OR 2ND FLR;  Service: Radiology;  Laterality: N/A;    NASAL SEPTOPLASTY N/A 2019     Procedure: SEPTOPLASTY, NOSE;  Surgeon: Martinez Flores III, MD;  Location: [facility];  Service: ENT;  Laterality: N/A;  FOLLOW DR SALVATORE KIMBROUGH PROTOCOL    OPEN REDUCTION AND INTERNAL FIXATION (ORIF) OF FRACTURE OF DISTAL RADIUS Left 2020     Procedure: ORIF, FRACTURE, RADIUS, DISTAL-left;  Surgeon: Ellen Moe MD;  Location: [facility];  Service: Orthopedics;  Laterality: Left;    SURGICAL REMOVAL OF NASAL TURBINATE Bilateral 2019     Procedure: EXCISION, NASAL TURBINATE;  Surgeon: Martinez Flores III, MD;  Location: [facility];  Service: ENT;  Laterality: Bilateral;    TONSILLECTOMY        WISDOM TOOTH EXTRACTION          Family History    None         Social History            Socioeconomic History    Marital status:    Occupational History    Occupation: /Rabbi       Employer: [employer]   Tobacco Use    Smoking status: Former Smoker       Packs/day: 0.25       Years: 15.00       Pack years: 3.75       Quit date: 1982       Years since quittin.0    Smokeless tobacco: Former User   Substance and Sexual Activity    Alcohol use: Yes       Comment: no daily or heavy use, ~4 times per month    Drug use: No    Sexual activity: Not Currently       Social Determinants of Health          Financial Resource Strain: Low Risk     Difficulty of Paying Living Expenses: Not very hard   Food Insecurity: No Food Insecurity    Worried About Running Out of Food in the Last Year: Never true    Ran Out of Food in the Last Year: Never true   Transportation Needs: No Transportation Needs    Lack of Transportation (Medical): No    Lack of Transportation (Non-Medical): No   Physical Activity: Insufficiently Active    Days of Exercise per Week: 1 day    Minutes of Exercise per Session: 60 min   Stress: Stress Concern Present    Feeling of Stress : To some extent   Social Connections: Moderately Integrated    Frequency of Communication with Friends and Family: More than three times a week    Frequency of Social Gatherings with Friends and Family: Once a week    Attends Orthodoxy Services: More than 4 times per year    Active Member of Clubs or Organizations: Yes    Attends Club or Organization Meetings: More than 4 times per year    Marital Status:    Housing Stability: Low Risk     Unable to Pay for Housing in the Last Year: No    Number of Places Lived in the Last Year: 1    Unstable Housing in the Last Year: No         Review of Systems   Constitutional: Negative for fever.        Weight loss   HENT: Negative for nosebleeds.    Eyes: Positive for visual disturbance.   Respiratory: Negative for shortness of breath.    Cardiovascular: Negative for chest pain.   Gastrointestinal: Negative for constipation.   Endocrine: Negative for cold intolerance.   Genitourinary: Negative for difficulty urinating.   Musculoskeletal: Positive for back pain and gait problem. Negative for neck pain.   Skin: Negative for color change.   Neurological: Negative for headaches.   Hematological: Does not bruise/bleed easily.   Psychiatric/Behavioral: Positive for dysphoric mood. The patient is nervous/anxious.          OBJECTIVE:      Vital Signs  Vitals:    11/14/22 1128   BP: 124/62    Pulse: 60   Resp: 20   Temp: 98.4 °F (36.9 °C)          Physical Exam:     Constitutional: She appears well-developed and well-nourished. No distress.      Eyes: EOM are normal.      Abdominal: Soft.      Skin: Skin displays no rash on extremities. Skin displays no lesions on extremities.     Psych/Behavior: She is alert. She is oriented to person, place, and time.     Musculoskeletal:      Comments: Guarded, myelopathic gait  Hand intrinsics 4/5 bilaterally   Biceps 4+ on R, 4 on L    Decreased arm swing on right     Neurological:        Coordination: She has normal finger to nose coordination.   + Maria's bilaterally   Tremulous with R>L resting tremor      Pulmonary: symmetric bilaterally      Diagnostic Results:  No new      ASSESSMENT/PLAN:      Manisha Wynne is a 71 y.o. female with cervical stenosis and myelopathy. Indications, risks, benefits, alternatives of surgery reviewed with patient. She voices understanding and elects to proceed.

## 2022-11-14 NOTE — ANESTHESIA PROCEDURE NOTES
Intubation    Date/Time: 11/14/2022 2:50 PM  Performed by: Marisol Souza CRNA  Authorized by: Rhonda G Leopold, MD     Intubation:     Induction:  Intravenous    Intubated:  Postinduction    Mask Ventilation:  Easy mask    Attempts:  1    Attempted By:  CRNA    Method of Intubation:  Direct    Blade:  Rosas 2    Laryngeal View Grade: Grade I - full view of cords      Difficult Airway Encountered?: No      Complications:  None    Airway Device:  Oral endotracheal tube    Airway Device Size:  7.0    Style/Cuff Inflation:  Cuffed (inflated to minimal occlusive pressure)    Tube secured:  21    Secured at:  The lips    Placement Verified By:  Capnometry    Complicating Factors:  None    Findings Post-Intubation:  BS equal bilateral and atraumatic/condition of teeth unchanged  Notes:      Head and neck neutral

## 2022-11-14 NOTE — PROGRESS NOTES
Spoke with Anita in Pacemaker clinic this am, see note.  Spoke with MALKA MYRICK, on call for Neurosurgery,  notified all recent labs done except CBC, noted H&H trending down slightly, PA checks labs and states to draw and send to lab this am.

## 2022-11-15 LAB
ANION GAP SERPL CALC-SCNC: 5 MMOL/L (ref 8–16)
ANION GAP SERPL CALC-SCNC: 8 MMOL/L (ref 8–16)
BASOPHILS # BLD AUTO: 0.01 K/UL (ref 0–0.2)
BASOPHILS NFR BLD: 0.1 % (ref 0–1.9)
BUN SERPL-MCNC: 13 MG/DL (ref 8–23)
BUN SERPL-MCNC: 14 MG/DL (ref 8–23)
CALCIUM SERPL-MCNC: 7.8 MG/DL (ref 8.7–10.5)
CALCIUM SERPL-MCNC: 8.1 MG/DL (ref 8.7–10.5)
CHLORIDE SERPL-SCNC: 110 MMOL/L (ref 95–110)
CHLORIDE SERPL-SCNC: 110 MMOL/L (ref 95–110)
CO2 SERPL-SCNC: 21 MMOL/L (ref 23–29)
CO2 SERPL-SCNC: 22 MMOL/L (ref 23–29)
CREAT SERPL-MCNC: 0.9 MG/DL (ref 0.5–1.4)
CREAT SERPL-MCNC: 0.9 MG/DL (ref 0.5–1.4)
DIFFERENTIAL METHOD: ABNORMAL
EOSINOPHIL # BLD AUTO: 0 K/UL (ref 0–0.5)
EOSINOPHIL NFR BLD: 0 % (ref 0–8)
ERYTHROCYTE [DISTWIDTH] IN BLOOD BY AUTOMATED COUNT: 13.6 % (ref 11.5–14.5)
EST. GFR  (NO RACE VARIABLE): >60 ML/MIN/1.73 M^2
EST. GFR  (NO RACE VARIABLE): >60 ML/MIN/1.73 M^2
GLUCOSE SERPL-MCNC: 146 MG/DL (ref 70–110)
GLUCOSE SERPL-MCNC: 146 MG/DL (ref 70–110)
HCT VFR BLD AUTO: 30.9 % (ref 37–48.5)
HGB BLD-MCNC: 10.1 G/DL (ref 12–16)
IMM GRANULOCYTES # BLD AUTO: 0.04 K/UL (ref 0–0.04)
IMM GRANULOCYTES NFR BLD AUTO: 0.4 % (ref 0–0.5)
LYMPHOCYTES # BLD AUTO: 1.2 K/UL (ref 1–4.8)
LYMPHOCYTES NFR BLD: 12.3 % (ref 18–48)
MCH RBC QN AUTO: 30.9 PG (ref 27–31)
MCHC RBC AUTO-ENTMCNC: 32.7 G/DL (ref 32–36)
MCV RBC AUTO: 95 FL (ref 82–98)
MONOCYTES # BLD AUTO: 0.6 K/UL (ref 0.3–1)
MONOCYTES NFR BLD: 5.5 % (ref 4–15)
NEUTROPHILS # BLD AUTO: 8.1 K/UL (ref 1.8–7.7)
NEUTROPHILS NFR BLD: 81.7 % (ref 38–73)
NRBC BLD-RTO: 0 /100 WBC
PLATELET # BLD AUTO: 143 K/UL (ref 150–450)
PMV BLD AUTO: 10.4 FL (ref 9.2–12.9)
POTASSIUM SERPL-SCNC: 4.8 MMOL/L (ref 3.5–5.1)
POTASSIUM SERPL-SCNC: 5.2 MMOL/L (ref 3.5–5.1)
RBC # BLD AUTO: 3.27 M/UL (ref 4–5.4)
SODIUM SERPL-SCNC: 136 MMOL/L (ref 136–145)
SODIUM SERPL-SCNC: 140 MMOL/L (ref 136–145)
WBC # BLD AUTO: 9.97 K/UL (ref 3.9–12.7)

## 2022-11-15 PROCEDURE — 97110 THERAPEUTIC EXERCISES: CPT

## 2022-11-15 PROCEDURE — 25000242 PHARM REV CODE 250 ALT 637 W/ HCPCS: Performed by: PHYSICIAN ASSISTANT

## 2022-11-15 PROCEDURE — 97161 PT EVAL LOW COMPLEX 20 MIN: CPT

## 2022-11-15 PROCEDURE — 80048 BASIC METABOLIC PNL TOTAL CA: CPT | Mod: 91 | Performed by: STUDENT IN AN ORGANIZED HEALTH CARE EDUCATION/TRAINING PROGRAM

## 2022-11-15 PROCEDURE — 25000003 PHARM REV CODE 250: Performed by: PHYSICIAN ASSISTANT

## 2022-11-15 PROCEDURE — 97535 SELF CARE MNGMENT TRAINING: CPT

## 2022-11-15 PROCEDURE — 25000242 PHARM REV CODE 250 ALT 637 W/ HCPCS: Performed by: STUDENT IN AN ORGANIZED HEALTH CARE EDUCATION/TRAINING PROGRAM

## 2022-11-15 PROCEDURE — 85025 COMPLETE CBC W/AUTO DIFF WBC: CPT | Performed by: STUDENT IN AN ORGANIZED HEALTH CARE EDUCATION/TRAINING PROGRAM

## 2022-11-15 PROCEDURE — 36415 COLL VENOUS BLD VENIPUNCTURE: CPT | Performed by: STUDENT IN AN ORGANIZED HEALTH CARE EDUCATION/TRAINING PROGRAM

## 2022-11-15 PROCEDURE — 94761 N-INVAS EAR/PLS OXIMETRY MLT: CPT

## 2022-11-15 PROCEDURE — 63600175 PHARM REV CODE 636 W HCPCS: Performed by: STUDENT IN AN ORGANIZED HEALTH CARE EDUCATION/TRAINING PROGRAM

## 2022-11-15 PROCEDURE — 99024 PR POST-OP FOLLOW-UP VISIT: ICD-10-PCS | Mod: ,,, | Performed by: PHYSICIAN ASSISTANT

## 2022-11-15 PROCEDURE — 11000001 HC ACUTE MED/SURG PRIVATE ROOM

## 2022-11-15 PROCEDURE — 97530 THERAPEUTIC ACTIVITIES: CPT

## 2022-11-15 PROCEDURE — 94640 AIRWAY INHALATION TREATMENT: CPT

## 2022-11-15 PROCEDURE — 97165 OT EVAL LOW COMPLEX 30 MIN: CPT

## 2022-11-15 PROCEDURE — 99024 POSTOP FOLLOW-UP VISIT: CPT | Mod: ,,, | Performed by: PHYSICIAN ASSISTANT

## 2022-11-15 PROCEDURE — 25000003 PHARM REV CODE 250: Performed by: STUDENT IN AN ORGANIZED HEALTH CARE EDUCATION/TRAINING PROGRAM

## 2022-11-15 RX ORDER — POLYETHYLENE GLYCOL 3350 17 G/17G
17 POWDER, FOR SOLUTION ORAL DAILY
Status: DISCONTINUED | OUTPATIENT
Start: 2022-11-15 | End: 2022-11-23 | Stop reason: HOSPADM

## 2022-11-15 RX ORDER — IPRATROPIUM BROMIDE AND ALBUTEROL SULFATE 2.5; .5 MG/3ML; MG/3ML
3 SOLUTION RESPIRATORY (INHALATION) EVERY 8 HOURS
Status: DISCONTINUED | OUTPATIENT
Start: 2022-11-15 | End: 2022-11-15

## 2022-11-15 RX ORDER — IPRATROPIUM BROMIDE AND ALBUTEROL SULFATE 2.5; .5 MG/3ML; MG/3ML
3 SOLUTION RESPIRATORY (INHALATION) EVERY 8 HOURS
Status: DISCONTINUED | OUTPATIENT
Start: 2022-11-16 | End: 2022-11-23 | Stop reason: HOSPADM

## 2022-11-15 RX ADMIN — METHOCARBAMOL 1000 MG: 100 INJECTION INTRAMUSCULAR; INTRAVENOUS at 09:11

## 2022-11-15 RX ADMIN — MUPIROCIN: 20 OINTMENT TOPICAL at 08:11

## 2022-11-15 RX ADMIN — SENNOSIDES AND DOCUSATE SODIUM 2 TABLET: 50; 8.6 TABLET ORAL at 09:11

## 2022-11-15 RX ADMIN — PRAVASTATIN SODIUM 20 MG: 20 TABLET ORAL at 08:11

## 2022-11-15 RX ADMIN — GABAPENTIN 100 MG: 100 CAPSULE ORAL at 09:11

## 2022-11-15 RX ADMIN — VANCOMYCIN HYDROCHLORIDE 125 MG: KIT at 11:11

## 2022-11-15 RX ADMIN — ACETAMINOPHEN 1000 MG: 325 TABLET ORAL at 09:11

## 2022-11-15 RX ADMIN — Medication 2 G: at 11:11

## 2022-11-15 RX ADMIN — IPRATROPIUM BROMIDE AND ALBUTEROL SULFATE 3 ML: 2.5; .5 SOLUTION RESPIRATORY (INHALATION) at 12:11

## 2022-11-15 RX ADMIN — IPRATROPIUM BROMIDE AND ALBUTEROL SULFATE 3 ML: 2.5; .5 SOLUTION RESPIRATORY (INHALATION) at 08:11

## 2022-11-15 RX ADMIN — VANCOMYCIN HYDROCHLORIDE 125 MG: KIT at 05:11

## 2022-11-15 RX ADMIN — GABAPENTIN 100 MG: 100 CAPSULE ORAL at 08:11

## 2022-11-15 RX ADMIN — DICYCLOMINE HYDROCHLORIDE 20 MG: 20 TABLET ORAL at 06:11

## 2022-11-15 RX ADMIN — METOPROLOL TARTRATE 25 MG: 25 TABLET, FILM COATED ORAL at 09:11

## 2022-11-15 RX ADMIN — MUPIROCIN: 20 OINTMENT TOPICAL at 09:11

## 2022-11-15 RX ADMIN — Medication 2 G: at 04:11

## 2022-11-15 RX ADMIN — DICYCLOMINE HYDROCHLORIDE 20 MG: 20 TABLET ORAL at 11:11

## 2022-11-15 RX ADMIN — OXYCODONE 5 MG: 5 TABLET ORAL at 06:11

## 2022-11-15 RX ADMIN — GABAPENTIN 100 MG: 100 CAPSULE ORAL at 03:11

## 2022-11-15 RX ADMIN — METHOCARBAMOL 1000 MG: 100 INJECTION INTRAMUSCULAR; INTRAVENOUS at 03:11

## 2022-11-15 RX ADMIN — ACETAMINOPHEN 1000 MG: 325 TABLET ORAL at 03:11

## 2022-11-15 RX ADMIN — FLUOXETINE 40 MG: 20 CAPSULE ORAL at 09:11

## 2022-11-15 RX ADMIN — HEPARIN SODIUM 5000 UNITS: 5000 INJECTION INTRAVENOUS; SUBCUTANEOUS at 05:11

## 2022-11-15 RX ADMIN — METHOCARBAMOL 1000 MG: 100 INJECTION INTRAMUSCULAR; INTRAVENOUS at 06:11

## 2022-11-15 RX ADMIN — BUPROPION HYDROCHLORIDE 300 MG: 300 TABLET, FILM COATED, EXTENDED RELEASE ORAL at 09:11

## 2022-11-15 RX ADMIN — ACETAMINOPHEN 1000 MG: 325 TABLET ORAL at 06:11

## 2022-11-15 RX ADMIN — LOSARTAN POTASSIUM 25 MG: 25 TABLET, FILM COATED ORAL at 09:11

## 2022-11-15 RX ADMIN — VANCOMYCIN HYDROCHLORIDE 125 MG: KIT at 12:11

## 2022-11-15 RX ADMIN — POLYETHYLENE GLYCOL 3350 17 G: 17 POWDER, FOR SOLUTION ORAL at 03:11

## 2022-11-15 RX ADMIN — Medication 2 G: at 06:11

## 2022-11-15 RX ADMIN — IPRATROPIUM BROMIDE AND ALBUTEROL SULFATE 3 ML: 2.5; .5 SOLUTION RESPIRATORY (INHALATION) at 11:11

## 2022-11-15 RX ADMIN — VANCOMYCIN HYDROCHLORIDE 125 MG: KIT at 06:11

## 2022-11-15 RX ADMIN — OXYCODONE 5 MG: 5 TABLET ORAL at 12:11

## 2022-11-15 NOTE — TRANSFER OF CARE
"Anesthesia Transfer of Care Note    Patient: Manisha Wynne    Procedure(s) Performed: Procedure(s) (LRB):  LAMINECTOMY, SPINE, CERVICAL, WITH POSTERIOR FUSION C3-C6 (N/A)    Patient location: PACU    Anesthesia Type: general    Transport from OR: Transported from OR on 6-10 L/min O2 by face mask with adequate spontaneous ventilation    Post pain: adequate analgesia    Post assessment: no apparent anesthetic complications and tolerated procedure well    Post vital signs: stable    Level of consciousness: awake and alert    Nausea/Vomiting: no nausea/vomiting    Complications: none    Transfer of care protocol was followed      Last vitals:   Visit Vitals  /62 (BP Location: Right arm, Patient Position: Lying)   Pulse 60   Temp 36.9 °C (98.4 °F) (Oral)   Resp 20   Ht 5' 8" (1.727 m)   Wt 71.9 kg (158 lb 8.2 oz)   LMP 10/01/2003   SpO2 100%   Breastfeeding No   BMI 24.10 kg/m²     "

## 2022-11-15 NOTE — PLAN OF CARE
Problem: Occupational Therapy  Goal: Occupational Therapy Goal  Description: Goals to be met by: 11/29/22     Patient will increase functional independence with ADLs by performing:    UE Dressing with Stand-by Assistance.  LE Dressing with Stand-by Assistance and Assistive Devices as needed.  Grooming while standing at sink with Stand-by Assistance.  Toileting from toilet with Stand-by Assistance for hygiene and clothing management.   Toilet transfer to toilet with Stand-by Assistance.    Outcome: Ongoing, Progressing

## 2022-11-15 NOTE — NURSING TRANSFER
Nursing Transfer Note      11/14/2022     Reason patient is being transferred: INPT    Transfer To: ED XRAY then 924    Transfer via bed    Transfer with none    Transported by transport    Medicines sent: none    Any special needs or follow-up needed: none    Chart send with patient: Yes    Notified: friend    Patient reassessed at: 11/14/22

## 2022-11-15 NOTE — PROGRESS NOTES
Petar Villalobos - Neurosurgery ([facility])  Neurosurgery  Progress Note    Subjective:     History of Present Illness: Manisha Wynne is a 72 y.o. female with cervical stenosis and myelopathy. She has an MRI-non-compatible pacemaker in place. This will be due for replacement within the next few months.      Given the results of the myelogram, I expect that she would benefit from C3-C6 laminectomy and fusion, with undercutting of the C2 lamina. We had a lengthy and pleasant conversation about this procedure, including the associated hospitalization.       Post-Op Info:  Procedure(s) (LRB):  LAMINECTOMY, SPINE, CERVICAL, WITH POSTERIOR FUSION C3-C6 (N/A)   1 Day Post-Op     Interval History: NAEON. Pain well controlled this AM. Working on getting [place] collar to work with therapy. Drain with 110 cc in place. Started on vanc for c diff prophylaxis 2/2 history of c diff infection. PT/OT/OOB. Gamble removed, will CTM voiding.    Medications:  Continuous Infusions:   sodium chloride 0.9% Stopped (11/14/22 1637)     Scheduled Meds:   acetaminophen  1,000 mg Oral Q8H    albuterol-ipratropium  3 mL Nebulization Q4H WAKE    buPROPion  300 mg Oral Daily    ceFAZolin (ANCEF) IVPB  2 g Intravenous Q8H    dicyclomine  20 mg Oral Q6H    FLUoxetine  40 mg Oral Daily    gabapentin  100 mg Oral TID    heparin (porcine)  5,000 Units Subcutaneous Q8H    lisdexamfetamine  70 mg Oral QAM    losartan  25 mg Oral Daily    methocarbamol (ROBAXIN) IVPB  1,000 mg Intravenous Q8H    metoprolol tartrate  25 mg Oral BID    mupirocin   Nasal BID    pravastatin  20 mg Oral Daily    senna-docusate 8.6-50 mg  2 tablet Oral Daily    vancomycin  125 mg Oral Q6H     PRN Meds:aluminum-magnesium hydroxide-simethicone, bisacodyL, HYDROmorphone, melatonin, ondansetron, oxyCODONE, prochlorperazine     Review of Systems  Objective:     Weight: 71.9 kg (158 lb 8.2 oz)  Body mass index is 24.1 kg/m².  Vital Signs (Most Recent):  Temp: 98.3 °F  (36.8 °C) (11/15/22 0759)  Pulse: 64 (11/15/22 0854)  Resp: 18 (11/15/22 0854)  BP: (!) 142/61 (11/15/22 0759)  SpO2: 98 % (11/15/22 0854)   Vital Signs (24h Range):  Temp:  [97.7 °F (36.5 °C)-98.3 °F (36.8 °C)] 98.3 °F (36.8 °C)  Pulse:  [60-66] 64  Resp:  [11-19] 18  SpO2:  [91 %-100 %] 98 %  BP: (121-171)/(58-76) 142/61     Date 11/15/22 0700 - 11/16/22 0659   Shift 8078-5710 2984-1788 0806-7401 24 Hour Total   INTAKE   P.O. 240   240   Shift Total(mL/kg) 240(3.3)   240(3.3)   OUTPUT   Urine(mL/kg/hr) 0   0   Shift Total(mL/kg) 0(0)   0(0)   Weight (kg) 71.9 71.9 71.9 71.9                        Closed/Suction Drain 11/14/22 1742 Posterior Neck Accordion 10 Fr. (Active)   Site Description Unable to view 11/15/22 0800   Dressing Status Clean;Dry;Intact 11/15/22 0800   Dressing Intervention Integrity maintained 11/15/22 0800   Drainage Sanguineous 11/15/22 0800   Status Other (Comment) 11/14/22 2136   Output (mL) 110 mL 11/15/22 0645       Physical Exam    Neurosurgery Physical Exam  General: well developed, well nourished, no distress.   Head: normocephalic, atraumatic  Neurologic: Alert and oriented. Thought content appropriate.  GCS: Motor: 6/Verbal: 5/Eyes: 4 GCS Total: 15  Mental Status: Awake, Alert, Oriented x 4  Language: No aphasia  Speech: No dysarthria  Cranial nerves: face symmetric, tongue midline, CN II-XII grossly intact.   Eyes: pupils equal, round, reactive to light with accommodation, EOMI.   Pulmonary: normal respirations, no signs of respiratory distress  Abdomen: soft, non-distended, not tender to palpation  Skin: Skin is warm, dry and intact.  Sensory: intact to light touch throughout    Motor Strength:Moves all extremities spontaneously with good tone.  Full strength upper and lower extremities. No abnormal movements seen.       Reflexes:   Maria's: positive bilaterally    Incision: Clean, dry, dressing intact. No surrounding erythema or edema. No drainage or TTP.     HV to full suction        Significant Labs:  Recent Labs   Lab 11/15/22  0243   *      K 5.2*      CO2 21*   BUN 13   CREATININE 0.9   CALCIUM 7.8*     Recent Labs   Lab 11/14/22  1155 11/15/22  0243   WBC 7.81 9.97   HGB 11.1* 10.1*   HCT 34.7* 30.9*    143*     No results for input(s): LABPT, INR, APTT in the last 48 hours.  Microbiology Results (last 7 days)       ** No results found for the last 168 hours. **          All pertinent labs from the last 24 hours have been reviewed.    Significant Diagnostics:  X-Ray Cervical Spine AP And Lateral    Result Date: 11/15/2022  Interval posterior fusion and laminectomy at C3-C6. Electronically signed by: Jose Francisco Guzman MD Date:    11/15/2022 Time:    00:23       Assessment/Plan:     * Cervical myelopathy  Manisha Wynne is a 72 y.o. female with cervical myelopathy s/p C3-C6 posterior cervical fusion on 11/14 by Dr. Cheek     - Neuro stable on exam  - Incision with dressing intact  - HV drain to full suction. 110 cc output. Keep in place. Abx while in place.  - Post op XR satisfactory  - C collar to be worn when upright and OOB  - Pain control  - PT/OT  - Duonebs/CPT q 8 hours for atelectasis prophylaxis   - Continue oral vancomycin in the post operative period for C diff prophylaxis. Patient has a history of severe C diff infection.   - Senna and miralax daily  - SQH    Discussed with Dr. Adia Quezada PA-C  Neurosurgery  Petar Villalobos - Neurosurgery ([facility])

## 2022-11-15 NOTE — HOSPITAL COURSE
11/15: NAEON. Pain well controlled this AM. Working on getting [place] collar to work with therapy. Drain with 110 cc in place. Started on vanc for c diff prophylaxis 2/2 history of c diff infection. PT/OT/OOB. Gamble removed, will CTM voiding.   11/16: NAEON. Increased pain today, pain medications adjusted. Working well with PT/OT. Drain with 130 cc output. Voiding spontaneously.   11/17: NAEON. Continues to complain of pain, relieved with po medication. Increased gabapentin to 200 tid. Spoke with patient about utilizing pain medication. SBP labile with spikes to 190. Will continue to monitor, if pain controlled and BP still elevated will consider increasing losartan. Voiding spontaneously. No BM, passing flatus.   11/18: NAEON. SBP spikes continue in AM, losartan increased 50 mg daily. Pain this AM, improved with medication. Neuro exam stable. No BM yet, plan for suppository this PM if unable to go. Pending IPR  11/19: NAEON. Patient neuro stable. No complaints this morning. HV drain to gravity, 100 cc SS output, will keep today. CBC and BMP pending. Patient had friend bring home vyvanse, okay to resume. Had BM yesterday. Pending IPR, likely Monday.   11/20: NAEON. AFVSS. Exam stable. HV in place; d/c today. Pending IPR, likely tomorrow.   11/21: NAEON. Exam stable. Voiding spontaneously, BM post op. Na Pending IPR, medically stable. Na 134, started on NS @ 75 ml/hr x 5 hours. CTM  11/22: Febrile Tmax 101.1 overnight. Without leukocytosis. VSS. BLE US negative for DVT. UA negative. Reporting right sided neck spasms. Voiding spontaneously. Blood cultures obtained. IS encouraged hourly. Rehab denied. Will pursue SNF vs home with .   11/23: NAEON. Neuro exam stable. Tmax 100.9 overnight, fever work up negative. Stable for dc to SNF. Dc instructions discussed with patient. She voiced understanding. All of her questions were answered    Exam day of discharge  General: well developed, well nourished, no distress.   Head:  normocephalic, atraumatic  Neurologic: Alert and oriented. Thought content appropriate.  Language: No aphasia  Speech: No dysarthria  Cranial nerves: face symmetric, tongue midline, CN II-XII grossly intact.   Eyes: pupils equal, round, reactive to light with accommodation, EOMI.   Pulmonary: normal respirations, no signs of respiratory distress  Skin: Skin is warm, dry and intact.  Sensory: intact to light touch throughout  Motor Strength:Moves all extremities spontaneously with good tone.  Full strength upper and lower extremities. No abnormal movements seen.   Incision: Clean, dry, prineo  intact. Skin edges well approximated. No surrounding erythema or edema. No drainage or TTP.

## 2022-11-15 NOTE — SUBJECTIVE & OBJECTIVE
Interval History: NAEON. Pain well controlled this AM. Working on getting [place] collar to work with therapy. Drain with 110 cc in place. Started on vanc for c diff prophylaxis 2/2 history of c diff infection. PT/OT/OOB. Gamble removed, will CTM voiding.    Medications:  Continuous Infusions:   sodium chloride 0.9% Stopped (11/14/22 1637)     Scheduled Meds:   acetaminophen  1,000 mg Oral Q8H    albuterol-ipratropium  3 mL Nebulization Q4H WAKE    buPROPion  300 mg Oral Daily    ceFAZolin (ANCEF) IVPB  2 g Intravenous Q8H    dicyclomine  20 mg Oral Q6H    FLUoxetine  40 mg Oral Daily    gabapentin  100 mg Oral TID    heparin (porcine)  5,000 Units Subcutaneous Q8H    lisdexamfetamine  70 mg Oral QAM    losartan  25 mg Oral Daily    methocarbamol (ROBAXIN) IVPB  1,000 mg Intravenous Q8H    metoprolol tartrate  25 mg Oral BID    mupirocin   Nasal BID    pravastatin  20 mg Oral Daily    senna-docusate 8.6-50 mg  2 tablet Oral Daily    vancomycin  125 mg Oral Q6H     PRN Meds:aluminum-magnesium hydroxide-simethicone, bisacodyL, HYDROmorphone, melatonin, ondansetron, oxyCODONE, prochlorperazine     Review of Systems  Objective:     Weight: 71.9 kg (158 lb 8.2 oz)  Body mass index is 24.1 kg/m².  Vital Signs (Most Recent):  Temp: 98.3 °F (36.8 °C) (11/15/22 0759)  Pulse: 64 (11/15/22 0854)  Resp: 18 (11/15/22 0854)  BP: (!) 142/61 (11/15/22 0759)  SpO2: 98 % (11/15/22 0854)   Vital Signs (24h Range):  Temp:  [97.7 °F (36.5 °C)-98.3 °F (36.8 °C)] 98.3 °F (36.8 °C)  Pulse:  [60-66] 64  Resp:  [11-19] 18  SpO2:  [91 %-100 %] 98 %  BP: (121-171)/(58-76) 142/61     Date 11/15/22 0700 - 11/16/22 0659   Shift 0309-3139 8654-0716 3322-5467 24 Hour Total   INTAKE   P.O. 240   240   Shift Total(mL/kg) 240(3.3)   240(3.3)   OUTPUT   Urine(mL/kg/hr) 0   0   Shift Total(mL/kg) 0(0)   0(0)   Weight (kg) 71.9 71.9 71.9 71.9                        Closed/Suction Drain 11/14/22 1742 Posterior Neck Accordion 10 Fr. (Active)   Site  Description Unable to view 11/15/22 0800   Dressing Status Clean;Dry;Intact 11/15/22 0800   Dressing Intervention Integrity maintained 11/15/22 0800   Drainage Sanguineous 11/15/22 0800   Status Other (Comment) 11/14/22 2136   Output (mL) 110 mL 11/15/22 0645       Physical Exam    Neurosurgery Physical Exam  General: well developed, well nourished, no distress.   Head: normocephalic, atraumatic  Neurologic: Alert and oriented. Thought content appropriate.  GCS: Motor: 6/Verbal: 5/Eyes: 4 GCS Total: 15  Mental Status: Awake, Alert, Oriented x 4  Language: No aphasia  Speech: No dysarthria  Cranial nerves: face symmetric, tongue midline, CN II-XII grossly intact.   Eyes: pupils equal, round, reactive to light with accommodation, EOMI.   Pulmonary: normal respirations, no signs of respiratory distress  Abdomen: soft, non-distended, not tender to palpation  Skin: Skin is warm, dry and intact.  Sensory: intact to light touch throughout    Motor Strength:Moves all extremities spontaneously with good tone.  Full strength upper and lower extremities. No abnormal movements seen.       Reflexes:   Maria's: positive bilaterally    Incision: Clean, dry, dressing intact. No surrounding erythema or edema. No drainage or TTP.     HV to full suction       Significant Labs:  Recent Labs   Lab 11/15/22  0243   *      K 5.2*      CO2 21*   BUN 13   CREATININE 0.9   CALCIUM 7.8*     Recent Labs   Lab 11/14/22  1155 11/15/22  0243   WBC 7.81 9.97   HGB 11.1* 10.1*   HCT 34.7* 30.9*    143*     No results for input(s): LABPT, INR, APTT in the last 48 hours.  Microbiology Results (last 7 days)       ** No results found for the last 168 hours. **          All pertinent labs from the last 24 hours have been reviewed.    Significant Diagnostics:  X-Ray Cervical Spine AP And Lateral    Result Date: 11/15/2022  Interval posterior fusion and laminectomy at C3-C6. Electronically signed by: Jose Francisco Guzman MD  Date:    11/15/2022 Time:    00:23

## 2022-11-15 NOTE — ASSESSMENT & PLAN NOTE
Manisha Wynne is a 72 y.o. female with cervical myelopathy s/p C3-C6 posterior cervical fusion on 11/14 by Dr. Cheek     - Neuro stable on exam  - Incision with dressing intact  - HV drain to full suction. 110 cc output. Keep in place. Abx while in place.  - Post op XR satisfactory  - C collar to be worn when upright and OOB  - Pain control  - PT/OT  - Duonebs/CPT q 8 hours for atelectasis prophylaxis   - Continue oral vancomycin in the post operative period for C diff prophylaxis. Patient has a history of severe C diff infection.   - Senna and miralax daily  - SQH    Discussed with Dr. Cheek

## 2022-11-15 NOTE — PLAN OF CARE
PT eval complete, appropriate goals created  Problem: Physical Therapy  Goal: Physical Therapy Goal  Description: Goals to be met by: 22     Patient will increase functional independence with mobility by performin. Supine to sit with Josephine  2. Sit to supine with Josephine  3. Sit to stand transfer with Supervision  4. Bed to chair transfer with Supervision using Rolling Walker  5. Gait  x 50 feet with Stand-by Assistance using Rolling Walker.   6. Ascend/descend 10 stair with right Handrails Josephine using No Assistive Device.     Outcome: Ongoing, Progressing

## 2022-11-15 NOTE — PT/OT/SLP EVAL
Physical Therapy Evaluation    Patient Name:  Manisha Wynne   MRN:  [id number]  Co-evaluation w/ OT 2/2 suspected pt complexity and requirement of assistance from 2 skilled therapists to maximize treatment potential and maintain pt safety.  Recommendations:     Discharge Recommendations:  other (see comments) (TBD, limited eval 2/2 broken bed preventing OOB mobility)   Discharge Equipment Recommendations: other (see comments) (tbd)   Barriers to discharge: Inaccessible home and Decreased caregiver support    Assessment:     Manisha Wynne is a 72 y.o. female admitted with a medical diagnosis of Cervical myelopathy.  She presents with the following impairments/functional limitations:  weakness, impaired self care skills, impaired functional mobility, impaired balance, impaired cognition, decreased coordination, decreased upper extremity function, decreased lower extremity function, decreased safety awareness, pain, impaired coordination, orthopedic precautions. Pt pleasant and motivated to work w/ therapy, but limited ability to evaluate pt 2/2 her bed being broken and not descending to a height allowing for pt to get in/out of bed safely. Her strength and mobility in bed appear WNL and significant time spent educating pt on use of C Collar, spinal precs, and therex she can be performing in bed regularly w/ plans to assess further mobility once bed issue is fixed. Unable to determine a d/c recommendation at this time 2/2 limited eval due to bed malfunctioning.    Rehab Prognosis: Good; patient would benefit from acute skilled PT services to address these deficits and reach maximum level of function.    Recent Surgery: Procedure(s) (LRB):  LAMINECTOMY, SPINE, CERVICAL, WITH POSTERIOR FUSION C3-C6 (N/A) 1 Day Post-Op    Plan:     During this hospitalization, patient to be seen 4 x/week to address the identified rehab impairments via gait training, therapeutic activities, therapeutic exercises,  neuromuscular re-education and progress toward the following goals:    Plan of Care Expires:  12/14/22    Subjective     Chief Complaint: neck pain  Patient/Family Comments/goals: pt wants bed to work so she can get up w/ therapy  Pain/Comfort:  Pain Rating 1: 9/10  Location - Orientation 1: posterior  Location 1: neck  Pain Addressed 1: Pre-medicate for activity, Reposition, Distraction  Pain Rating Post-Intervention 1: 9/10    Patients cultural, spiritual, Scientology conflicts given the current situation: no    Living Environment:  Pt lives alone in a duplex w/ 10STE.  Prior to admission, patients level of function was independent.  Equipment used at home: other (see comments) (walking sticks).  DME owned (not currently used): none.  Upon discharge, patient will have assistance from unknown; pt states she can call neighbor to come help with tasks real quickly if needed intermittently but not daily assistance.    Objective:     Communicated with RN prior to session.  Patient found HOB elevated with bed alarm, cervical collar, PureWick, peripheral IV, SCD  upon PT entry to room.    General Precautions: Standard, fall   Orthopedic Precautions:spinal precautions   Braces: Aspen collar  Respiratory Status: Room air    Exams:  Cognitive Exam:  Patient is oriented to Person, Place, Time, and Situation  Fine Motor Coordination:    -       Impaired  Left hand thumb/finger opposition skills mod and Right hand thumb/finger opposition skills mod  Sensation:    -       Impaired  light/touch BLE from neuropathy (dull sensation, no n/t)  RLE ROM: WFL  RLE Strength: WFL  LLE ROM: WFL  LLE Strength: WFL    Functional Mobility:  Deferred 2/2 bed malfunctioning      AM-PAC 6 CLICK MOBILITY  Total Score:18       Treatment & Education:  Pt educated on therex to perform 3x/day independently in order to maintain/progress mobility and strength and allow treatment time to focus on functional mobility. Pt demonstrated independence with  all exercises and verbalized understanding: ankle pumps, heel slides, hip abd/add, SLR, quad sets, glute sets. 2x20 ea exercise  Pt educated on spinal precautions, alterations to movements in order to stay within precautions and importance of learning precautions now as pt currently in too much pain to move neck but as neck heals and she can move it, the odds of her accidentally breaking spinal precautions increases. PT educated on use of C collar and proper positioning of it, w/ pt's new C collar donned in bed as her C collar was far too large and neurosurgery ordered the proper size which had been delivered but was never put on pt.   Pt educated on PT POC/goals, continued treatment, and further assessment of mobility once bed fixed. Pt educated on use of SCDs for blood clot prevention and to continue moving regularly in bed to prevent bed sores and muscle tightness.      Patient left HOB elevated with all lines intact, call button in reach, bed alarm on, and RN notified of bed not working .    GOALS:   Multidisciplinary Problems       Physical Therapy Goals          Problem: Physical Therapy    Goal Priority Disciplines Outcome Goal Variances Interventions   Physical Therapy Goal     PT, PT/OT Ongoing, Progressing     Description: Goals to be met by: 22     Patient will increase functional independence with mobility by performin. Supine to sit with [place]  2. Sit to supine with [place]  3. Sit to stand transfer with Supervision  4. Bed to chair transfer with Supervision using Rolling Walker  5. Gait  x 50 feet with Stand-by Assistance using Rolling Walker.   6. Ascend/descend 10 stair with right Handrails [place] using No Assistive Device.                          History:     Past Medical History:   Diagnosis Date    Abnormal Pap smear     Atrial fibrillation     AV block, 1st degree 2012    C. difficile diarrhea     RESOLVED    Cataract     Depression 2012    Facet  arthritis of lumbar region 03/31/2015    Falls     3 falls in the last 6 mos--noted 6/19/19    Hyperlipidemia     Hypertension 07/24/2012    Neuropathy     Other specified cardiac dysrhythmias(427.89)     Sleep apnea     Syncope 07/24/2012    Tremors of nervous system     hands bilaterally       Past Surgical History:   Procedure Laterality Date    APPLICATION OF CARTILAGE GRAFT Bilateral 12/19/2019    Procedure: APPLICATION, CARTILAGE GRAFT AURICULAR JEZ;  Surgeon: Martinez Flores III, MD;  Location: [facility];  Service: ENT;  Laterality: Bilateral;    CARDIAC PACEMAKER PLACEMENT  09/07/2012    [id number]    CATARACT EXTRACTION W/  INTRAOCULAR LENS IMPLANT Right 5/16/2022    Procedure: EXTRACTION, CATARACT, WITH IOL INSERTION;  Surgeon: Ines Aguilera MD;  Location: [facility] OR;  Service: Ophthalmology;  Laterality: Right;  Catalys     CATARACT EXTRACTION W/  INTRAOCULAR LENS IMPLANT Left 6/20/2022    Procedure: EXTRACTION, CATARACT, WITH IOL INSERTION;  Surgeon: Ines Aguilera MD;  Location: [facility] OR;  Service: Ophthalmology;  Laterality: Left;  Catalys     DILATION AND CURETTAGE OF UTERUS      Hx of precancerous cells?    ENDOSCOPIC ULTRASOUND OF UPPER GASTROINTESTINAL TRACT N/A 7/5/2019    Procedure: ULTRASOUND, UPPER GI TRACT, ENDOSCOPIC;  Surgeon: Kev Calderon MD;  Location: [facility] ENDO (2ND FLR);  Service: Endoscopy;  Laterality: N/A;  Pacemaker-Adam   appt confirmed-rb    MYELOGRAPHY N/A 5/4/2021    Procedure: Myelogram  CERVICAL, THORACIC AND LUMBAR;  Surgeon: [facility] Diagnostic Provider;  Location: [facility] OR 2ND FLR;  Service: Radiology;  Laterality: N/A;    NASAL SEPTOPLASTY N/A 12/19/2019    Procedure: SEPTOPLASTY, NOSE;  Surgeon: Martinez Flores III, MD;  Location: [facility];  Service: ENT;  Laterality: N/A;  FOLLOW DR SALVATORE KIMBROUGH PROTOCOL    OPEN REDUCTION AND INTERNAL FIXATION (ORIF) OF FRACTURE OF DISTAL RADIUS Left 1/24/2020    Procedure: ORIF, FRACTURE, RADIUS, DISTAL-left;  Surgeon:  Ellen Moe MD;  Location: [facility] OR;  Service: Orthopedics;  Laterality: Left;    POSTERIOR FUSION OF CERVICAL SPINE WITH LAMINECTOMY N/A 11/14/2022    Procedure: LAMINECTOMY, SPINE, CERVICAL, WITH POSTERIOR FUSION C3-C6;  Surgeon: Nicolette Cheek MD;  Location: [address] FLR;  Service: Neurosurgery;  Laterality: N/A;  TORONTO III, ASA III, BLOOD TYPE AND SCREEN, NEUROMONITORING EMG SEP MEP, BRACE/HALO [place], BED REGULAR BED, HEADREST Lissie, POSITION PRONE, SPECIAL EQUIPMENT NIKI MIDDLETON, RADIOLOGY C-ARM, EXT. BONE STIMULATOR BIOMET    REPLACEMENT OF PACEMAKER GENERATOR Left 10/7/2022    Procedure: REPLACEMENT, PACEMAKER GENERATOR;  Surgeon: John Sheets MD;  Location: [facility] EP LAB;  Service: Cardiology;  Laterality: Left;  YAS, SSS, AVB, Dual PPM Gen Change,SJM, MAC ,OR, 3 prep,** Adam dcPPM in situ**    SURGICAL REMOVAL OF NASAL TURBINATE Bilateral 12/19/2019    Procedure: EXCISION, NASAL TURBINATE;  Surgeon: Martinez Flores III, MD;  Location: [facility] OR;  Service: ENT;  Laterality: Bilateral;    TONSILLECTOMY      WISDOM TOOTH EXTRACTION         Time Tracking:     PT Received On: 11/15/22  PT Start Time: 1345     PT Stop Time: 1427  PT Total Time (min): 42 min     Billable Minutes: Evaluation 4, Therapeutic Activity 15, and Therapeutic Exercise 23      11/15/2022

## 2022-11-15 NOTE — PLAN OF CARE
[facility] - Neurosurgery (Hospital)  Initial Discharge Assessment       Primary Care Provider: Iris Blue MD    Admission Diagnosis: Cervical spondylosis with myelopathy [M47.12]  Cervical myelopathy [G95.9]    Admission Date: 11/14/2022  Expected Discharge Date: 11/16/2022    Discharge Barriers Identified: None    Payor: HUMANA MANAGED MEDICARE / Plan: HUMANA MEDICARE HMO / Product Type: Capitation /     Extended Emergency Contact Information  Primary Emergency Contact: balbir patel  Mobile Phone: [phone]  Relation: Friend  Preferred language: English   needed? No  Secondary Emergency Contact: Jean MarieLacy  Mobile Phone: [phone]  Relation: Friend    Discharge Plan A: Home Health  Discharge Plan B: Rehab      [id number] - [place], [place] - [address]  [address]  [facility] [address]  Phone: [phone] Fax: [phone]    [facility] - [facility] [address]  [address] [address]  Phone: [phone] Fax: [phone]    [facility]  [address]  [facility] [address]  Phone: [phone] Fax: [phone]     obtained discharge planning assessment with patient.  Patient provided with discharge planning booklet.    Initial Assessment (most recent)       Adult Discharge Assessment - 11/15/22 1512          Discharge Assessment    Assessment Type Discharge Planning Assessment     Confirmed/corrected address, phone number and insurance Yes     Confirmed Demographics Correct on Facesheet     Source of Information patient     Communicated CARO with patient/caregiver Yes     Reason For Admission cervical myelopathy     Lives With alone     Do you expect to return to your current living situation? Yes     Do you have help at home or someone to help you manage your care at home? No     Prior to hospitilization cognitive status: Alert/Oriented     Current  cognitive status: Alert/Oriented     Walking or Climbing Stairs Difficulty other (see comments)   uses walking stick occassionally    Dressing/Bathing Difficulty none     Readmission within 30 days? No     Patient currently being followed by outpatient case management? No     Do you currently have service(s) that help you manage your care at home? No     Do you take prescription medications? Yes     Do you have prescription coverage? Yes     Coverage HUMANA MANAGED MEDICARE - HUMANA MEDICARE O     Do you have any problems affording any of your prescribed medications? No     Is the patient taking medications as prescribed? yes     Who is going to help you get home at discharge? family     How do you get to doctors appointments? car, drives self;family or friend will provide     Are you on dialysis? No     Do you take coumadin? No     Discharge Plan A Home Health     Discharge Plan B Rehab     DME Needed Upon Discharge  other (see comments)   tbd    Discharge Barriers Identified None

## 2022-11-15 NOTE — OP NOTE
[facility] - Surgery ([facility])  Neurosurgery  Operative Note    SUMMARY      Date of Procedure: 11/14/2022     Procedure: Procedure(s) (LRB):  LAMINECTOMY, SPINE, CERVICAL, WITH POSTERIOR FUSION C3-C6 (N/A)     Surgeon(s) and Role:     * Nicolette Cheek MD - Primary     * Doc Maradiaga MD - Resident - Assisting    Pre-Operative Diagnosis: Cervical spondylosis with myelopathy [M47.12]    Post-Operative Diagnosis: Post-Op Diagnosis Codes:     * Cervical spondylosis with myelopathy [M47.12]    Technical Procedures Used:   1. C3, C4, C5, C6 posterior cervical laminectomy and decompression of spinal cord    2. C3, C4, C5 and C6 posterior instrumentation with lateral mass screws  3. C3-C4, C4-C5,C5-C6 posterolateral fusion.   4. Use of morselized autograft.  5. Use of morselized allograft.      Indications: Manisha Wynne is a 72 y.o. female with cervical stenosis and myelopathy. She has an MRI-non-compatible pacemaker in place. This will be due for replacement within the next few months.      Given the results of the myelogram, I expect that she would benefit from C3-C6 laminectomy and fusion, with undercutting of the C2 lamina. We had a lengthy and pleasant conversation about this procedure, including the associated hospitalization. We also discussed appropriate cat care modifications and obtaining a shower chair for the perioperative period.       Risks include, but are not limited to, bleeding, pain, infection, scarring/poor wound healing, need for further/repeat procedure, death, paralysis, blindness, pseudoarthrosis, C5 palsy, stroke/damage to major blood vessels, leak of cerebrospinal fluid, and hardware-related complications. Informed consent was obtained. We discussed that she should expect significant pain in the immediate postop period. We again discussed that the goal of a cervical surgery is to prevent worsening, not necessarily to be able to guarantee improved function. She expressed understanding.        Description of the Procedure:   The patient was brought into the Operating Room. She was intubated and anesthetized by Anesthesia, with instructions given to maintain her MAPs >85 throughout the case. 2g of IV Ancef as well as dexamethasone were administered, and LACHELLE hose and SCDs were applied.      The patient was then placed in the Zumbrota head clamp and carefully placed prone on operating room table with her neck in the  flexed position. All appropriate pressure points were padded. The skin incision was marked in the midline from the protruberances of C2 to C7.  The patient was then prepped and draped in the usual sterile fashion. Marcaine 0.5% with epinephrine was injected into the skin, and skin incision was made using a #15 blade. Sharp dissection was carried down through the midline plane with the use of Bovie cautery. This was carried down to the level of the spinous processes. Once the spinous processes and midline were identified, a subperiosteal dissection was used to expose the spinous processes as well as the lamina and lateral masses of C2, C3, C4, C5, C6, C7. Self-retaining retractors were put into place.  We brought in fluoroscopy to confirm the appropriate levels.     We then drilled  holes into the lateral masses using the high-speed drill. We used anatomic landmarks to drill these  holes for later placement of our lateral mass screws at C3, C4, C5 and C6. Each hole was then tapped with the 3.3 mm tap to a depth of 12-14 mm before being felt with a ball tip probe. All of these were found to be satisfactory. A 3.8 by  12 or 14 mm screw was placed in each of the 7  holes with satisfactory purchase.  Lateral x-ray again confirmed appropriate placement.     Then, a high-speed drill was used to drill down the lamina at C3, C4, C5, and C6 bilaterally in order to perform our laminectomies. The lamina   of C3, C4, C5, and C6  were then elevated, thus performing the  laminectomies at all the aforementioned levels. Hemostasis was then achieved using FloSeal and bipolar electrocautery.     The lateral masses of C3, C4, C5, and C6 were then decorticated bilaterally using the high-speed drill. 60 mm prebent rods were placed bilaterally. These were affixed using screw caps. Hemostasis was again obtained and the wound was copiously irrigated. Morselized autograft and Actifuse shape allograft  were  placed over the region of decortication to allow for fusion.  A medium hemovac drain was laid down in the surgical bed. The wound was closed in layers, with 0 Vicryl in the fascia.  A running 4-0 Monocryl and Prineo tape were used on the skin.  The patient was carefully flipped back supine onto the stretcher. She was taken out of the Foster head clamp and awakened by the anesthesia service before being taken to PACU in satisfactory condition.      Antibiotic-containing irrigation was used throughout the case.  All counts were correct at the end of the case, and neuromonitoring remained stable throughout the case with no change in motor evoked potential, somatosensory evoked potential, and EMG remained quiet.     Complications: No    Estimated Blood Loss (EBL): 150 mL           Specimens:   Specimen (24h ago, onward)      None             Implants:   Implant Name Type Inv. Item Serial No.  Lot No. LRB No. Used Action   GRAFT [place] MED CYL 2.6ML - SNA  GRAFT [place] MED CYL 2.6ML NA  [id number] N/A 1 Implanted   GRAFT [place] BIOACTIVE 2.5ML - SNA  GRAFT [place] BIOACTIVE 2.5ML NA  [id number] N/A 1 Implanted   SCREW POLYAXIAL CERV 3.8X12MM - SNA  SCREW POLYAXIAL CERV 3.8X12MM NA [employer] NA N/A 5 Implanted   SCREW POLYAXIAL CERV 3.8X14MM - SNA  SCREW POLYAXIAL CERV 3.8X14MM NA [employer] NA N/A 3 Implanted   TOSIN PROFICIENT TI CVD 3.5X60MM - SNA  TOSIN PROFICIENT TI CVD 3.5X60MM NA [employer] NA N/A 2 Implanted   SCREW PROFICIENT LOCKING - SNA  SCREW  PROFICIENT LOCKING NA [employer] NA N/A 8 Implanted              Condition: Stable    Disposition: PACU - hemodynamically stable.    Attestation: I was present and scrubbed for the entire procedure.

## 2022-11-15 NOTE — HPI
Manisha Wynne is a 72 y.o. female with cervical stenosis and myelopathy. She has an MRI-non-compatible pacemaker in place. This will be due for replacement within the next few months.      Given the results of the myelogram, I expect that she would benefit from C3-C6 laminectomy and fusion, with undercutting of the C2 lamina. We had a lengthy and pleasant conversation about this procedure, including the associated hospitalization.

## 2022-11-15 NOTE — PLAN OF CARE
Problem: Adult Inpatient Plan of Care  Goal: Plan of Care Review  Outcome: Ongoing, Progressing  Flowsheets (Taken 11/14/2022 2200)  Plan of Care Reviewed With:   patient   friend  Goal: Absence of Hospital-Acquired Illness or Injury  Outcome: Ongoing, Progressing  Goal: Optimal Comfort and Wellbeing  Outcome: Ongoing, Progressing  Intervention: Monitor Pain and Promote Comfort  Flowsheets (Taken 11/14/2022 2100)  Pain Management Interventions:   care clustered   pain management plan reviewed with patient/caregiver     Problem: Infection  Goal: Absence of Infection Signs and Symptoms  Outcome: Ongoing, Progressing  Intervention: Prevent or Manage Infection  Flowsheets (Taken 11/14/2022 2200)  Infection Management: aseptic technique maintained

## 2022-11-15 NOTE — PT/OT/SLP EVAL
Occupational Therapy  Co- Evaluation with PT    Name: Manisha Wynne  MRN: [id number]  Admitting Diagnosis:  Cervical myelopathy  Recent Surgery: Procedure(s) (LRB):  LAMINECTOMY, SPINE, CERVICAL, WITH POSTERIOR FUSION C3-C6 (N/A) 1 Day Post-Op    Co-evaluation and co-treatment performed for this visit due to suspected patient need for two skilled therapists to ensure patient and staff safety and to accommodate for patient activity tolerance/pain management      Recommendations:     Discharge Recommendations: other (see comments) (TBD: limited eval due to broken bed, limiting OOB/EOB activity)  Discharge Equipment Recommendations:  other (see comments) (TBD)  Barriers to discharge:  Other (Comment) (unable to determine due to unable to assess OOB activity due to safety concerns with broken bed)    Assessment:     Manisha Wynne is a 72 y.o. female with a medical diagnosis of Cervical myelopathy.  She presents with the following performance deficits affecting function: weakness, impaired endurance, impaired self care skills, impaired functional mobility, impaired cognition, decreased safety awareness, pain, orthopedic precautions, decreased lower extremity function, decreased upper extremity function, decreased coordination.  Pt pleasant and agreeable to session, motivated to get OOB with therapy. However, unable to evaluate EOB/OOB activity due to bed being broken and unable to lower to height to safely get pt in/out of bed. Significant time spent educating pt on spinal precautions, C collar, and exercises to complete in bed to maintain functional strength and ROM until bed issue is fixed. Unable to determine a d/c recommendation at this time due to limited eval with broken bed.    Rehab Prognosis: Good; patient would benefit from acute skilled OT services to address these deficits and reach maximum level of function.       Plan:     Patient to be seen 4 x/week to address the above listed problems via  self-care/home management, therapeutic exercises, therapeutic activities  Plan of Care Expires: 12/15/22  Plan of Care Reviewed with: patient    Subjective     Chief Complaint: neck pain  Patient/Family Comments/goals: wants bed to work so she can get OOB and up with therapy    Occupational Profile:  Living Environment: Pt lives alone in a duplex with 10 [address]; no steps once inside home. Has a tub/shower combo.   Previous level of function: Pt was IND with all ADLs and functional mobility   Roles and Routines: pt works PRN at hospital as a   Equipment Used at Home:  other (see comments) (walking sticks)  Assistance upon Discharge: Pt reports she can call neighbor to help with tasks if needed intermittently, but pt will have no consistent/reliable assistance.    Pain/Comfort:  Pain Rating 1: 9/10  Location - Side 1: Bilateral  Location - Orientation 1: generalized  Location 1: neck  Pain Addressed 1: Distraction, Nurse notified  Pain Rating Post-Intervention 1: 9/10    Patients cultural, spiritual, Synagogue conflicts given the current situation:      Objective:     Communicated with: RN prior to session.  Patient found supine with bed alarm, cervical collar, PureWick, peripheral IV, SCD upon OT entry to room.    General Precautions: Standard, fall   Orthopedic Precautions:spinal precautions   Braces: Aspen collar  Respiratory Status: Room air    Occupational Performance:    Bed Mobility:    Deferred due to malfunctioning bed    Functional Mobility/Transfers:  Deferred due malfunctioning bed    Activities of Daily Living:  Grooming: set-up assistance to wash face in bed   Total A to doff c-collar and don new Aspen collar while sitting up in bed w/ HOB support.     Cognitive/Visual Perceptual:  Cognitive/Psychosocial Skills:     -       Oriented to: Person, Place, Time, and Situation   -       Follows Commands/attention:follows two-step commands  -       Communication: clear/fluent  -       Memory: appears WFL  through conversation and evaluation  -       Safety awareness/insight to disability: intact   -       Mood/Affect/Coping skills/emotional control: Appropriate to situation    Hearing: Intact    Vision: Intact visual fields and wears glasses     Physical Exam:  Postural examination/scapula alignment:    -       No postural abnormalities identified  Skin integrity: Visible skin intact     Left UE Right UE   UE Edema absent absent   UE ROM AROM WFL and but limited by pain with shoulder flexion beyond 90 AROM WFL and limited by pain with shoulder flexion beyond 90 degrees   UE Strength Not formally tested 2/2 spinal precautions Not formally tested 2/2 spinal precautions    Strength grasp WFL grasp WFL   Sensation LUE INTACT:light/touch RUE INTACT: light/touch   Fine Motor Coordination:  LUE IMPAIRED: hand thumb/finger opposition skills  RUE IMPAIRED: hand thumb/finger opposition skills    Gross Motor Coordination: LUE INTACT: WFL; mild tremor at baseline RUE INTACT:WFL; mild tremor at baseline        AMPAC 6 Click ADL:  AMPA Total Score: 18    Treatment & Education:  Educated pt on spinal precautions and implications for safe ADL completion and functional transfers.   Educated pt on proper positioning/fit of c-collar for optimal neck alignment  Educated pt on therex to perform indepedently in order to maintain BUE strength needed for ADLs and functional mobility, as well as to promote return of FM coordination. Pt demos ability to complete the following IND'ly: shoulder flex/ext, elbow flex/ext, shoulder ab/adduction, wrist flex/ext, and hand thumb/finger opposition x 10 reps each.   Educated pt on increasing time spent with HOB fully raised to promote increased upright sitting tolerance due to inability to get OOB due to bed malfunction.   Educated pt on OT POC/goals and plan for further assessment for OOB activity once bed fixed.       Patient left supine with all lines intact, call button in reach, bed alarm on,  and RN notified    GOALS:   Multidisciplinary Problems       Occupational Therapy Goals          Problem: Occupational Therapy    Goal Priority Disciplines Outcome Interventions   Occupational Therapy Goal     OT, PT/OT Ongoing, Progressing    Description: Goals to be met by: 11/29/22     Patient will increase functional independence with ADLs by performing:    UE Dressing with Stand-by Assistance.  LE Dressing with Stand-by Assistance and Assistive Devices as needed.  Grooming while standing at sink with Stand-by Assistance.  Toileting from toilet with Stand-by Assistance for hygiene and clothing management.   Toilet transfer to toilet with Stand-by Assistance.                         History:     Past Medical History:   Diagnosis Date    Abnormal Pap smear     Atrial fibrillation     AV block, 1st degree 07/25/2012    C. difficile diarrhea     RESOLVED    Cataract     Depression 07/24/2012    Facet arthritis of lumbar region 03/31/2015    Falls     3 falls in the last 6 mos--noted 6/19/19    Hyperlipidemia     Hypertension 07/24/2012    Neuropathy     Other specified cardiac dysrhythmias(427.89)     Sleep apnea     Syncope 07/24/2012    Tremors of nervous system     hands bilaterally         Past Surgical History:   Procedure Laterality Date    APPLICATION OF CARTILAGE GRAFT Bilateral 12/19/2019    Procedure: APPLICATION, CARTILAGE GRAFT AURICULAR JEZ;  Surgeon: Martinez Flores III, MD;  Location: [facility];  Service: ENT;  Laterality: Bilateral;    CARDIAC PACEMAKER PLACEMENT  09/07/2012    [id number]    CATARACT EXTRACTION W/  INTRAOCULAR LENS IMPLANT Right 5/16/2022    Procedure: EXTRACTION, CATARACT, WITH IOL INSERTION;  Surgeon: Ines Aguilera MD;  Location: [facility];  Service: Ophthalmology;  Laterality: Right;  Catalys     CATARACT EXTRACTION W/  INTRAOCULAR LENS IMPLANT Left 6/20/2022    Procedure: EXTRACTION, CATARACT, WITH IOL INSERTION;  Surgeon: Ines Aguilera MD;  Location: [facility];   Service: Ophthalmology;  Laterality: Left;  Catalys     DILATION AND CURETTAGE OF UTERUS      Hx of precancerous cells?    ENDOSCOPIC ULTRASOUND OF UPPER GASTROINTESTINAL TRACT N/A 7/5/2019    Procedure: ULTRASOUND, UPPER GI TRACT, ENDOSCOPIC;  Surgeon: Kev Calderon MD;  Location: [facility] ENDO (2ND FLR);  Service: Endoscopy;  Laterality: N/A;  Pacemaker-Adam   appt confirmed-rb    MYELOGRAPHY N/A 5/4/2021    Procedure: Myelogram  CERVICAL, THORACIC AND LUMBAR;  Surgeon: [facility] Diagnostic Provider;  Location: [facility] OR 2ND FLR;  Service: Radiology;  Laterality: N/A;    NASAL SEPTOPLASTY N/A 12/19/2019    Procedure: SEPTOPLASTY, NOSE;  Surgeon: Martinez Flores III, MD;  Location: [facility] OR;  Service: ENT;  Laterality: N/A;  FOLLOW DR SALVATORE KIMBROUGH PROTOCOL    OPEN REDUCTION AND INTERNAL FIXATION (ORIF) OF FRACTURE OF DISTAL RADIUS Left 1/24/2020    Procedure: ORIF, FRACTURE, RADIUS, DISTAL-left;  Surgeon: Ellen Moe MD;  Location: [facility] OR;  Service: Orthopedics;  Laterality: Left;    POSTERIOR FUSION OF CERVICAL SPINE WITH LAMINECTOMY N/A 11/14/2022    Procedure: LAMINECTOMY, SPINE, CERVICAL, WITH POSTERIOR FUSION C3-C6;  Surgeon: Nicolette Cheek MD;  Location: [facility] 2ND FLR;  Service: Neurosurgery;  Laterality: N/A;  TORONTO III, ASA III, BLOOD TYPE AND SCREEN, NEUROMONITORING EMG SEP MEP, BRACE/HALO Knifley, BED REGULAR BED, HEADREST Snyder, POSITION PRONE, SPECIAL EQUIPMENT NIKI MIDDLETON, RADIOLOGY C-ARM, EXT. BONE STIMULATOR BIOMET    REPLACEMENT OF PACEMAKER GENERATOR Left 10/7/2022    Procedure: REPLACEMENT, PACEMAKER GENERATOR;  Surgeon: John Sheets MD;  Location: [facility] EP LAB;  Service: Cardiology;  Laterality: Left;  YAS, SSS, AVB, Dual PPM Gen Change,SJM, MAC ,NJ, 3 prep,** Adam dcPPM in situ**    SURGICAL REMOVAL OF NASAL TURBINATE Bilateral 12/19/2019    Procedure: EXCISION, NASAL TURBINATE;  Surgeon: Martinez Flores III, MD;  Location: [facility] OR;  Service: ENT;  Laterality: Bilateral;     TONSILLECTOMY      WISDOM TOOTH EXTRACTION         Time Tracking:     OT Date of Treatment: 11/15/22  OT Start Time: 1345  OT Stop Time: 1427  OT Total Time (min): 42 min    Billable Minutes:Evaluation 4  Self Care/Home Management 28  Therapeutic Exercise 10    11/15/2022

## 2022-11-16 PROCEDURE — 94664 DEMO&/EVAL PT USE INHALER: CPT

## 2022-11-16 PROCEDURE — 99024 PR POST-OP FOLLOW-UP VISIT: ICD-10-PCS | Mod: ,,, | Performed by: PHYSICIAN ASSISTANT

## 2022-11-16 PROCEDURE — 25000003 PHARM REV CODE 250: Performed by: STUDENT IN AN ORGANIZED HEALTH CARE EDUCATION/TRAINING PROGRAM

## 2022-11-16 PROCEDURE — 27000646 HC AEROBIKA DEVICE

## 2022-11-16 PROCEDURE — 99024 POSTOP FOLLOW-UP VISIT: CPT | Mod: ,,, | Performed by: PHYSICIAN ASSISTANT

## 2022-11-16 PROCEDURE — 25000003 PHARM REV CODE 250: Performed by: PHYSICIAN ASSISTANT

## 2022-11-16 PROCEDURE — 63600175 PHARM REV CODE 636 W HCPCS: Performed by: STUDENT IN AN ORGANIZED HEALTH CARE EDUCATION/TRAINING PROGRAM

## 2022-11-16 PROCEDURE — 94640 AIRWAY INHALATION TREATMENT: CPT

## 2022-11-16 PROCEDURE — 11000001 HC ACUTE MED/SURG PRIVATE ROOM

## 2022-11-16 PROCEDURE — 97530 THERAPEUTIC ACTIVITIES: CPT

## 2022-11-16 PROCEDURE — 94761 N-INVAS EAR/PLS OXIMETRY MLT: CPT

## 2022-11-16 PROCEDURE — 99900035 HC TECH TIME PER 15 MIN (STAT)

## 2022-11-16 PROCEDURE — 97535 SELF CARE MNGMENT TRAINING: CPT

## 2022-11-16 PROCEDURE — 25000242 PHARM REV CODE 250 ALT 637 W/ HCPCS: Performed by: PHYSICIAN ASSISTANT

## 2022-11-16 PROCEDURE — 63600175 PHARM REV CODE 636 W HCPCS: Performed by: PHYSICIAN ASSISTANT

## 2022-11-16 RX ORDER — OXYCODONE HYDROCHLORIDE 10 MG/1
10 TABLET ORAL EVERY 4 HOURS PRN
Status: DISCONTINUED | OUTPATIENT
Start: 2022-11-16 | End: 2022-11-23 | Stop reason: HOSPADM

## 2022-11-16 RX ORDER — HYDRALAZINE HYDROCHLORIDE 20 MG/ML
10 INJECTION INTRAMUSCULAR; INTRAVENOUS EVERY 6 HOURS PRN
Status: DISCONTINUED | OUTPATIENT
Start: 2022-11-16 | End: 2022-11-23 | Stop reason: HOSPADM

## 2022-11-16 RX ORDER — HYDROMORPHONE HYDROCHLORIDE 1 MG/ML
1 INJECTION, SOLUTION INTRAMUSCULAR; INTRAVENOUS; SUBCUTANEOUS EVERY 6 HOURS PRN
Status: DISCONTINUED | OUTPATIENT
Start: 2022-11-16 | End: 2022-11-18

## 2022-11-16 RX ADMIN — ACETAMINOPHEN 1000 MG: 325 TABLET ORAL at 09:11

## 2022-11-16 RX ADMIN — METOPROLOL TARTRATE 25 MG: 25 TABLET, FILM COATED ORAL at 09:11

## 2022-11-16 RX ADMIN — IPRATROPIUM BROMIDE AND ALBUTEROL SULFATE 3 ML: 2.5; .5 SOLUTION RESPIRATORY (INHALATION) at 03:11

## 2022-11-16 RX ADMIN — Medication 2 G: at 06:11

## 2022-11-16 RX ADMIN — HEPARIN SODIUM 5000 UNITS: 5000 INJECTION INTRAVENOUS; SUBCUTANEOUS at 09:11

## 2022-11-16 RX ADMIN — VANCOMYCIN HYDROCHLORIDE 125 MG: KIT at 05:11

## 2022-11-16 RX ADMIN — OXYCODONE 5 MG: 5 TABLET ORAL at 04:11

## 2022-11-16 RX ADMIN — VANCOMYCIN HYDROCHLORIDE 125 MG: KIT at 11:11

## 2022-11-16 RX ADMIN — OXYCODONE 5 MG: 5 TABLET ORAL at 09:11

## 2022-11-16 RX ADMIN — BUPROPION HYDROCHLORIDE 300 MG: 300 TABLET, FILM COATED, EXTENDED RELEASE ORAL at 09:11

## 2022-11-16 RX ADMIN — ACETAMINOPHEN 1000 MG: 325 TABLET ORAL at 02:11

## 2022-11-16 RX ADMIN — METHOCARBAMOL 1000 MG: 100 INJECTION INTRAMUSCULAR; INTRAVENOUS at 09:11

## 2022-11-16 RX ADMIN — LOSARTAN POTASSIUM 25 MG: 25 TABLET, FILM COATED ORAL at 09:11

## 2022-11-16 RX ADMIN — DICYCLOMINE HYDROCHLORIDE 20 MG: 20 TABLET ORAL at 11:11

## 2022-11-16 RX ADMIN — GABAPENTIN 100 MG: 100 CAPSULE ORAL at 09:11

## 2022-11-16 RX ADMIN — GABAPENTIN 100 MG: 100 CAPSULE ORAL at 02:11

## 2022-11-16 RX ADMIN — PRAVASTATIN SODIUM 20 MG: 20 TABLET ORAL at 09:11

## 2022-11-16 RX ADMIN — Medication 2 G: at 02:11

## 2022-11-16 RX ADMIN — IPRATROPIUM BROMIDE AND ALBUTEROL SULFATE 3 ML: 2.5; .5 SOLUTION RESPIRATORY (INHALATION) at 07:11

## 2022-11-16 RX ADMIN — METHOCARBAMOL 1000 MG: 100 INJECTION INTRAMUSCULAR; INTRAVENOUS at 05:11

## 2022-11-16 RX ADMIN — HYDRALAZINE HYDROCHLORIDE 10 MG: 20 INJECTION, SOLUTION INTRAMUSCULAR; INTRAVENOUS at 04:11

## 2022-11-16 RX ADMIN — MUPIROCIN: 20 OINTMENT TOPICAL at 09:11

## 2022-11-16 RX ADMIN — HEPARIN SODIUM 5000 UNITS: 5000 INJECTION INTRAVENOUS; SUBCUTANEOUS at 05:11

## 2022-11-16 RX ADMIN — ACETAMINOPHEN 1000 MG: 325 TABLET ORAL at 05:11

## 2022-11-16 RX ADMIN — SENNOSIDES AND DOCUSATE SODIUM 2 TABLET: 50; 8.6 TABLET ORAL at 09:11

## 2022-11-16 RX ADMIN — DICYCLOMINE HYDROCHLORIDE 20 MG: 20 TABLET ORAL at 05:11

## 2022-11-16 RX ADMIN — FLUOXETINE 40 MG: 20 CAPSULE ORAL at 09:11

## 2022-11-16 RX ADMIN — Medication 2 G: at 10:11

## 2022-11-16 RX ADMIN — POLYETHYLENE GLYCOL 3350 17 G: 17 POWDER, FOR SOLUTION ORAL at 09:11

## 2022-11-16 RX ADMIN — METHOCARBAMOL 1000 MG: 100 INJECTION INTRAMUSCULAR; INTRAVENOUS at 02:11

## 2022-11-16 RX ADMIN — HEPARIN SODIUM 5000 UNITS: 5000 INJECTION INTRAVENOUS; SUBCUTANEOUS at 02:11

## 2022-11-16 NOTE — PLAN OF CARE
"Pt AAOX4. RA. 18g L FA; infusing and 18g R FA; flushed and saline locked. Incision noted to neck with hemovac drain to post neck incision w/ sanguineous drainage noted. Pt denies numbness and tingling while lying in bed. Per pt "when she walks it is there". Purewick intact w/ clear yellow urine. Aspen brace intact as ordered; continuously. Pt continues to received oral and IV antibiotics. Bed in lowest position, wheels locked, side rails up x3, bed alarm on and call light within reach. No s/s of distress noted. Monitoring continues.   "

## 2022-11-16 NOTE — PROGRESS NOTES
Petar Villalobos - Neurosurgery ([facility])  Neurosurgery  Progress Note    Subjective:     History of Present Illness: Manisha Wynne is a 72 y.o. female with cervical stenosis and myelopathy. She has an MRI-non-compatible pacemaker in place. This will be due for replacement within the next few months.      Given the results of the myelogram, I expect that she would benefit from C3-C6 laminectomy and fusion, with undercutting of the C2 lamina. We had a lengthy and pleasant conversation about this procedure, including the associated hospitalization.       Post-Op Info:  Procedure(s) (LRB):  LAMINECTOMY, SPINE, CERVICAL, WITH POSTERIOR FUSION C3-C6 (N/A)   2 Days Post-Op     Interval History: NAEON. Increased pain today, pain medications adjusted. Working well with PT/OT. Drain with 130 cc output. Voiding spontaneously.     Medications:  Continuous Infusions:   sodium chloride 0.9% Stopped (11/14/22 1637)     Scheduled Meds:   acetaminophen  1,000 mg Oral Q8H    albuterol-ipratropium  3 mL Nebulization Q8H    buPROPion  300 mg Oral Daily    ceFAZolin (ANCEF) IVPB  2 g Intravenous Q8H    dicyclomine  20 mg Oral Q6H    FLUoxetine  40 mg Oral Daily    gabapentin  100 mg Oral TID    heparin (porcine)  5,000 Units Subcutaneous Q8H    losartan  25 mg Oral Daily    methocarbamol (ROBAXIN) IVPB  1,000 mg Intravenous Q8H    metoprolol tartrate  25 mg Oral BID    mupirocin   Nasal BID    polyethylene glycol  17 g Oral Daily    pravastatin  20 mg Oral Daily    senna-docusate 8.6-50 mg  2 tablet Oral Daily    vancomycin  125 mg Oral Q6H     PRN Meds:aluminum-magnesium hydroxide-simethicone, bisacodyL, hydrALAZINE, HYDROmorphone, melatonin, ondansetron, oxyCODONE, oxyCODONE, prochlorperazine     Review of Systems  Objective:     Weight: 71.9 kg (158 lb 8.2 oz)  Body mass index is 24.1 kg/m².  Vital Signs (Most Recent):  Temp: 98.5 °F (36.9 °C) (11/16/22 1121)  Pulse: 63 (11/16/22 1121)  Resp: 18 (11/16/22  1121)  BP: (!) 166/79 (11/16/22 1121)  SpO2: 99 % (11/16/22 1121)   Vital Signs (24h Range):  Temp:  [97.1 °F (36.2 °C)-99 °F (37.2 °C)] 98.5 °F (36.9 °C)  Pulse:  [59-65] 63  Resp:  [16-20] 18  SpO2:  [95 %-99 %] 99 %  BP: (124-181)/(59-88) 166/79     Date 11/16/22 0700 - 11/17/22 0659   Shift 8670-9348 6679-4919 2945-9742 24 Hour Total   INTAKE   P.O. 480   480   Shift Total(mL/kg) 480(6.7)   480(6.7)   OUTPUT   Shift Total(mL/kg)       Weight (kg) 71.9 71.9 71.9 71.9                        Closed/Suction Drain 11/14/22 1742 Posterior Neck Accordion 10 Fr. (Active)   Site Description Unable to view 11/15/22 0800   Dressing Status Clean;Dry;Intact 11/15/22 2040   Dressing Intervention Integrity maintained 11/15/22 2040   Drainage Sanguineous 11/15/22 2040   Status Other (Comment) 11/14/22 2136   Output (mL) 60 mL 11/16/22 0529       Female External Urinary Catheter 11/15/22 0800 (Active)   Skin no redness;no breakdown 11/15/22 2040   Tolerance no signs/symptoms of discomfort 11/15/22 2040   Suction Continuous suction at 70 mmHg 11/15/22 2040   Date of last wick change 11/15/22 11/15/22 2040   Output (mL) 400 mL 11/16/22 0529       Physical Exam    Neurosurgery Physical Exam  General: well developed, well nourished, no distress.   Head: normocephalic, atraumatic  Neurologic: Alert and oriented. Thought content appropriate.  GCS: Motor: 6/Verbal: 5/Eyes: 4 GCS Total: 15  Mental Status: Awake, Alert, Oriented x 4  Language: No aphasia  Speech: No dysarthria  Cranial nerves: face symmetric, tongue midline, CN II-XII grossly intact.   Eyes: pupils equal, round, reactive to light with accommodation, EOMI.   Pulmonary: normal respirations, no signs of respiratory distress  Abdomen: soft, non-distended, not tender to palpation  Skin: Skin is warm, dry and intact.  Sensory: intact to light touch throughout     Motor Strength:Moves all extremities spontaneously with good tone.  Full strength upper and lower extremities. No  abnormal movements seen.         Reflexes:   Maria's: positive bilaterally     Incision: Clean, dry, dressing intact. No surrounding erythema or edema. No drainage or TTP.      HV to full suction     Significant Labs:  Recent Labs   Lab 11/15/22  0243 11/15/22  1208   * 146*    140   K 5.2* 4.8    110   CO2 21* 22*   BUN 13 14   CREATININE 0.9 0.9   CALCIUM 7.8* 8.1*     Recent Labs   Lab 11/15/22  0243   WBC 9.97   HGB 10.1*   HCT 30.9*   *     No results for input(s): LABPT, INR, APTT in the last 48 hours.  Microbiology Results (last 7 days)       ** No results found for the last 168 hours. **          All pertinent labs from the last 24 hours have been reviewed.    Significant Diagnostics:  No results found in the last 24 hours.      Assessment/Plan:     * Cervical myelopathy  Manisha Wynne is a 72 y.o. female with cervical myelopathy s/p C3-C6 posterior cervical fusion on 11/14 by Dr. Cheek     - Neuro stable on exam  - Incision with dressing intact  - HV drain to full suction. 130 cc output. Keep in place. Abx while in place.  - Post op XR satisfactory  - C collar to be worn when upright and OOB  - Pain control- added oxy 10 q 4 hours prn severe pain.   - PT/OT  - Duonebs/CPT q 8 hours for atelectasis prophylaxis   - Continue oral vancomycin in the post operative period for C diff prophylaxis. Patient has a history of severe C diff infection.   - Senna and miralax daily  - SQH    Discussed with GELA VizcainoC  Neurosurgery  Petar Villalobos - Neurosurgery ([facility])

## 2022-11-16 NOTE — CARE UPDATE
11/15/22 0750   Patient Assessment/Suction   Level of Consciousness (AVPU) alert   Respiratory Effort Unlabored   All Lung Fields Breath Sounds Anterior:;diminished   Rhythm/Pattern, Respiratory no shortness of breath reported   Cough Frequency no cough   Sent to the lab? No   PRE-TX-O2   O2 Device (Oxygen Therapy) room air   SpO2 96 %   Pulse Oximetry Type Intermittent   Pulse 60   Resp 16   Positioning HOB elevated 30 degrees   Aerosol Therapy   $ Aerosol Therapy Charges Aerosol Treatment   Respiratory Treatment Status (SVN) given   Treatment Route (SVN) mouthpiece   Patient Position (SVN) HOB elevated   Post Treatment Assessment (SVN) breath sounds unchanged   Signs of Intolerance (SVN) none   Breath Sounds Post-Respiratory Treatment   Post-treatment Heart Rate (beats/min) 60   Post-treatment Resp Rate (breaths/min) 16   General Safety Checklist   Safety Promotion/Fall Prevention side rails raised   Airway Safety   Ambu bag with the patient? Yes, Adult Ambu   Is mask with the patient? Yes, Adult Mask   Suction set is at the bedside? Yes

## 2022-11-16 NOTE — SUBJECTIVE & OBJECTIVE
Interval History: NAEON. Increased pain today, pain medications adjusted. Working well with PT/OT. Drain with 130 cc output. Voiding spontaneously.     Medications:  Continuous Infusions:   sodium chloride 0.9% Stopped (11/14/22 1637)     Scheduled Meds:   acetaminophen  1,000 mg Oral Q8H    albuterol-ipratropium  3 mL Nebulization Q8H    buPROPion  300 mg Oral Daily    ceFAZolin (ANCEF) IVPB  2 g Intravenous Q8H    dicyclomine  20 mg Oral Q6H    FLUoxetine  40 mg Oral Daily    gabapentin  100 mg Oral TID    heparin (porcine)  5,000 Units Subcutaneous Q8H    losartan  25 mg Oral Daily    methocarbamol (ROBAXIN) IVPB  1,000 mg Intravenous Q8H    metoprolol tartrate  25 mg Oral BID    mupirocin   Nasal BID    polyethylene glycol  17 g Oral Daily    pravastatin  20 mg Oral Daily    senna-docusate 8.6-50 mg  2 tablet Oral Daily    vancomycin  125 mg Oral Q6H     PRN Meds:aluminum-magnesium hydroxide-simethicone, bisacodyL, hydrALAZINE, HYDROmorphone, melatonin, ondansetron, oxyCODONE, oxyCODONE, prochlorperazine     Review of Systems  Objective:     Weight: 71.9 kg (158 lb 8.2 oz)  Body mass index is 24.1 kg/m².  Vital Signs (Most Recent):  Temp: 98.5 °F (36.9 °C) (11/16/22 1121)  Pulse: 63 (11/16/22 1121)  Resp: 18 (11/16/22 1121)  BP: (!) 166/79 (11/16/22 1121)  SpO2: 99 % (11/16/22 1121)   Vital Signs (24h Range):  Temp:  [97.1 °F (36.2 °C)-99 °F (37.2 °C)] 98.5 °F (36.9 °C)  Pulse:  [59-65] 63  Resp:  [16-20] 18  SpO2:  [95 %-99 %] 99 %  BP: (124-181)/(59-88) 166/79     Date 11/16/22 0700 - 11/17/22 0659   Shift 8610-6765 0636-9433 0010-0262 24 Hour Total   INTAKE   P.O. 480   480   Shift Total(mL/kg) 480(6.7)   480(6.7)   OUTPUT   Shift Total(mL/kg)       Weight (kg) 71.9 71.9 71.9 71.9                        Closed/Suction Drain 11/14/22 1742 Posterior Neck Accordion 10 Fr. (Active)   Site Description Unable to view 11/15/22 0800   Dressing Status Clean;Dry;Intact 11/15/22 2040   Dressing Intervention Integrity  maintained 11/15/22 2040   Drainage Sanguineous 11/15/22 2040   Status Other (Comment) 11/14/22 2136   Output (mL) 60 mL 11/16/22 0529       Female External Urinary Catheter 11/15/22 0800 (Active)   Skin no redness;no breakdown 11/15/22 2040   Tolerance no signs/symptoms of discomfort 11/15/22 2040   Suction Continuous suction at 70 mmHg 11/15/22 2040   Date of last wick change 11/15/22 11/15/22 2040   Output (mL) 400 mL 11/16/22 0529       Physical Exam    Neurosurgery Physical Exam  General: well developed, well nourished, no distress.   Head: normocephalic, atraumatic  Neurologic: Alert and oriented. Thought content appropriate.  GCS: Motor: 6/Verbal: 5/Eyes: 4 GCS Total: 15  Mental Status: Awake, Alert, Oriented x 4  Language: No aphasia  Speech: No dysarthria  Cranial nerves: face symmetric, tongue midline, CN II-XII grossly intact.   Eyes: pupils equal, round, reactive to light with accommodation, EOMI.   Pulmonary: normal respirations, no signs of respiratory distress  Abdomen: soft, non-distended, not tender to palpation  Skin: Skin is warm, dry and intact.  Sensory: intact to light touch throughout     Motor Strength:Moves all extremities spontaneously with good tone.  Full strength upper and lower extremities. No abnormal movements seen.         Reflexes:   Maria's: positive bilaterally     Incision: Clean, dry, dressing intact. No surrounding erythema or edema. No drainage or TTP.      HV to full suction     Significant Labs:  Recent Labs   Lab 11/15/22  0243 11/15/22  1208   * 146*    140   K 5.2* 4.8    110   CO2 21* 22*   BUN 13 14   CREATININE 0.9 0.9   CALCIUM 7.8* 8.1*     Recent Labs   Lab 11/15/22  0243   WBC 9.97   HGB 10.1*   HCT 30.9*   *     No results for input(s): LABPT, INR, APTT in the last 48 hours.  Microbiology Results (last 7 days)       ** No results found for the last 168 hours. **          All pertinent labs from the last 24 hours have been  reviewed.    Significant Diagnostics:  No results found in the last 24 hours.

## 2022-11-16 NOTE — ANESTHESIA POSTPROCEDURE EVALUATION
Anesthesia Post Evaluation    Patient: Manisha Wynne    Procedure(s) Performed: Procedure(s) (LRB):  LAMINECTOMY, SPINE, CERVICAL, WITH POSTERIOR FUSION C3-C6 (N/A)    [facility] Anesthesia Post Op Evaluation      Vitals Value Taken Time   /59 11/15/22 2102   Temp 36.2 °C (97.1 °F) 11/15/22 2043   Pulse 64 11/15/22 2102   Resp 18 11/15/22 2043   SpO2 96 % 11/15/22 2043         Event Time   Out of Recovery 19:45:00         Pain/Corie Score: Pain Rating Prior to Med Admin: 4 (11/15/2022  9:01 PM)  Pain Rating Post Med Admin: 0 (11/15/2022  7:32 AM)  Corie Score: 10 (11/14/2022  7:45 PM)

## 2022-11-16 NOTE — ANESTHESIA POSTPROCEDURE EVALUATION
Anesthesia Post Evaluation    Patient: Manisha Wynne    Procedure(s) Performed: Procedure(s) (LRB):  LAMINECTOMY, SPINE, CERVICAL, WITH POSTERIOR FUSION C3-C6 (N/A)    Final Anesthesia Type: general      Patient location during evaluation: PACU  Patient participation: Yes- Able to Participate  Level of consciousness: awake and alert  Post-procedure vital signs: reviewed and stable  Pain management: adequate  Airway patency: patent    PONV status at discharge: No PONV  Anesthetic complications: no      Cardiovascular status: blood pressure returned to baseline  Respiratory status: unassisted  Hydration status: euvolemic  Follow-up not needed.          Vitals Value Taken Time   /59 11/15/22 2102   Temp 36.2 °C (97.1 °F) 11/15/22 2043   Pulse 64 11/15/22 2102   Resp 18 11/15/22 2043   SpO2 96 % 11/15/22 2043         Event Time   Out of Recovery 19:45:00         Pain/Corie Score: Pain Rating Prior to Med Admin: 4 (11/15/2022  9:01 PM)  Pain Rating Post Med Admin: 0 (11/15/2022  7:32 AM)  Corie Score: 10 (11/14/2022  7:45 PM)

## 2022-11-16 NOTE — NURSING
Notified [facility] stroke team, LETICIA Dyer, of pt's bp 178/77. No new orders received. Monitoring continues.

## 2022-11-16 NOTE — PT/OT/SLP PROGRESS
Occupational Therapy   Treatment    Name: Manisha Wynne  MRN: [id number]  Admitting Diagnosis:  Cervical myelopathy  2 Days Post-Op    Recommendations:     Discharge Recommendations: nursing facility, skilled  Discharge Equipment Recommendations:  bath bench, bedside commode  Barriers to discharge:  Decreased caregiver support    Assessment:     Manisha Wynne is a 72 y.o. female with a medical diagnosis of Cervical myelopathy.  She presents with with the following performance deficits affecting function are weakness, impaired endurance, impaired self care skills, impaired functional mobility, gait instability, impaired balance, decreased upper extremity function, decreased lower extremity function, pain, orthopedic precautions, impaired cardiopulmonary response to activity. Pt below baseline level of function at this time, limited by activity tolerance, pain, and spinal precautions. Pt would benefit from continued skilled acute OT services in order to maximize independence and safety with ADLs and functional mobility to ensure safe return to PLOF in the least restrictive environment. OT recommending SNF once pt is medically appropriate for d/c.      Pt pleasant and agreeable to therapy, tolerating session fairly. This was the first time pt had gotten OOB since surgery due to bed malfunction. Pt required cues to recall 3/3 spinal precautions. Educated pt on logroll technique for bed mobility, completing w/ min A. Pt dizzy upon sitting up, subsiding over time and after completing seated marching to promote increased bloodflow. Educated pt on compensatory figure-4 method for LB dressing to adhere to spinal precautions; pt unable to complete, but reports donning socks while long sitting in bed at baseline.Pt required mod A to stand from EOB and min A to pivot to chair. Pt very glad to be up in chair at end of session.     Rehab Prognosis:  Good; patient would benefit from acute skilled OT services to  "address these deficits and reach maximum level of function.       Plan:     Patient to be seen 4 x/week to address the above listed problems via self-care/home management, therapeutic activities, therapeutic exercises  Plan of Care Expires: 12/15/22  Plan of Care Reviewed with: patient    Subjective   "My legs feel very heavy" "They said they might send me home today and I just can't imagine doing that right now"  Pain/Comfort:  Pain Rating 1: 7/10  Location - Side 1: Bilateral  Location - Orientation 1: generalized  Location 1: neck  Pain Addressed 1: Reposition, Distraction, Cessation of Activity  Pain Rating Post-Intervention 1: 9/10    Objective:     Communicated with: RN prior to session.  Patient found supine with bed alarm, cervical collar, PureWick, SCD upon OT entry to room.    General Precautions: Standard, fall   Orthopedic Precautions:spinal precautions   Braces: Aspen collar  Respiratory Status: Room air     Occupational Performance:     Bed Mobility:    Patient completed Rolling/Turning to Right with minimum assistance  Patient completed Scooting/Bridging with minimum assistance  Patient completed Supine to Sit with moderate assistance  Balance: Pt sat EOB ~5 minutes with close SBA     Functional Mobility/Transfers:  Patient completed Sit <> Stand Transfer with moderate assistance  with  no assistive device and Th standing in front of pt for support   Patient completed Bed <> Chair Transfer using Stand Pivot technique with minimum assistance with no assistive device  Pt completed standing marching ~45 seconds w/ mod A for balance, working to increase standing tolerance needed for ADLs and functional mobility.    Activities of Daily Living:  Feeding:  Independent after set-up assistance    Grooming: stand by assistance to comb hair, wash face, and complete oral cares while sitting up in chair  Lower Body Dressing: maximal assistance to adjust B socks; pt unable to complete figure-4 due to " pain      [facility] 6 Click ADL: 18    Treatment & Education:  Therapist provided facilitation and instruction of proper body mechanics to adhere to spinal precautions and fall prevention strategies during tasks listed above.  Instructed patient to sit in bedside chair daily and for all meals to increase OOB/activity tolerance.  Instructed patient to use call light to have nursing staff assist with needs/transfers.  Discussed OT POC and answered all questions within OT scope of practice.  Whiteboard updated      Patient left up in chair with all lines intact, call button in reach, chair alarm on, and RN notified    GOALS:   Multidisciplinary Problems       Occupational Therapy Goals          Problem: Occupational Therapy    Goal Priority Disciplines Outcome Interventions   Occupational Therapy Goal     OT, PT/OT Ongoing, Progressing    Description: Goals to be met by: 11/29/22     Patient will increase functional independence with ADLs by performing:    UE Dressing with Stand-by Assistance.  LE Dressing with Stand-by Assistance and Assistive Devices as needed.  Grooming while standing at sink with Stand-by Assistance.  Toileting from toilet with Stand-by Assistance for hygiene and clothing management.   Toilet transfer to toilet with Stand-by Assistance.                         Time Tracking:     OT Date of Treatment: 11/16/22  OT Start Time: 1238  OT Stop Time: 1316  OT Total Time (min): 38 min    Billable Minutes:Self Care/Home Management 25  Therapeutic Activity 13    OT/DESTINY: OT          11/16/2022

## 2022-11-16 NOTE — PLAN OF CARE
CM noted rec of SNF.  CM sent referral to [facility].  Tonia indicated with [facility] that there may be a bed available on Friday.   11/16/22 1531   Post-Acute Status   Post-Acute Authorization Placement   Post-Acute Placement Status Referrals Sent

## 2022-11-17 LAB
BASOPHILS # BLD AUTO: 0.02 K/UL (ref 0–0.2)
BASOPHILS NFR BLD: 0.2 % (ref 0–1.9)
DIFFERENTIAL METHOD: ABNORMAL
EOSINOPHIL # BLD AUTO: 0.3 K/UL (ref 0–0.5)
EOSINOPHIL NFR BLD: 3.1 % (ref 0–8)
ERYTHROCYTE [DISTWIDTH] IN BLOOD BY AUTOMATED COUNT: 14.2 % (ref 11.5–14.5)
HCT VFR BLD AUTO: 34.7 % (ref 37–48.5)
HGB BLD-MCNC: 11.4 G/DL (ref 12–16)
IMM GRANULOCYTES # BLD AUTO: 0.05 K/UL (ref 0–0.04)
IMM GRANULOCYTES NFR BLD AUTO: 0.5 % (ref 0–0.5)
LYMPHOCYTES # BLD AUTO: 1.7 K/UL (ref 1–4.8)
LYMPHOCYTES NFR BLD: 17 % (ref 18–48)
MCH RBC QN AUTO: 30.6 PG (ref 27–31)
MCHC RBC AUTO-ENTMCNC: 32.9 G/DL (ref 32–36)
MCV RBC AUTO: 93 FL (ref 82–98)
MONOCYTES # BLD AUTO: 1.3 K/UL (ref 0.3–1)
MONOCYTES NFR BLD: 12.7 % (ref 4–15)
NEUTROPHILS # BLD AUTO: 6.8 K/UL (ref 1.8–7.7)
NEUTROPHILS NFR BLD: 66.5 % (ref 38–73)
NRBC BLD-RTO: 0 /100 WBC
PLATELET # BLD AUTO: 165 K/UL (ref 150–450)
PMV BLD AUTO: 10.6 FL (ref 9.2–12.9)
RBC # BLD AUTO: 3.73 M/UL (ref 4–5.4)
WBC # BLD AUTO: 10.21 K/UL (ref 3.9–12.7)

## 2022-11-17 PROCEDURE — 99900035 HC TECH TIME PER 15 MIN (STAT)

## 2022-11-17 PROCEDURE — 25000242 PHARM REV CODE 250 ALT 637 W/ HCPCS: Performed by: PHYSICIAN ASSISTANT

## 2022-11-17 PROCEDURE — 94760 N-INVAS EAR/PLS OXIMETRY 1: CPT

## 2022-11-17 PROCEDURE — 85025 COMPLETE CBC W/AUTO DIFF WBC: CPT | Performed by: PHYSICIAN ASSISTANT

## 2022-11-17 PROCEDURE — 94761 N-INVAS EAR/PLS OXIMETRY MLT: CPT

## 2022-11-17 PROCEDURE — 94664 DEMO&/EVAL PT USE INHALER: CPT

## 2022-11-17 PROCEDURE — 36415 COLL VENOUS BLD VENIPUNCTURE: CPT | Performed by: PHYSICIAN ASSISTANT

## 2022-11-17 PROCEDURE — 25000003 PHARM REV CODE 250: Performed by: STUDENT IN AN ORGANIZED HEALTH CARE EDUCATION/TRAINING PROGRAM

## 2022-11-17 PROCEDURE — 97116 GAIT TRAINING THERAPY: CPT

## 2022-11-17 PROCEDURE — 11000001 HC ACUTE MED/SURG PRIVATE ROOM

## 2022-11-17 PROCEDURE — 25000003 PHARM REV CODE 250: Performed by: PHYSICIAN ASSISTANT

## 2022-11-17 PROCEDURE — 94640 AIRWAY INHALATION TREATMENT: CPT

## 2022-11-17 PROCEDURE — 63600175 PHARM REV CODE 636 W HCPCS: Performed by: STUDENT IN AN ORGANIZED HEALTH CARE EDUCATION/TRAINING PROGRAM

## 2022-11-17 RX ORDER — GABAPENTIN 100 MG/1
200 CAPSULE ORAL 3 TIMES DAILY
Status: DISCONTINUED | OUTPATIENT
Start: 2022-11-17 | End: 2022-11-23 | Stop reason: HOSPADM

## 2022-11-17 RX ORDER — METHOCARBAMOL 500 MG/1
1000 TABLET, FILM COATED ORAL EVERY 8 HOURS
Status: DISCONTINUED | OUTPATIENT
Start: 2022-11-17 | End: 2022-11-23 | Stop reason: HOSPADM

## 2022-11-17 RX ADMIN — SENNOSIDES AND DOCUSATE SODIUM 2 TABLET: 50; 8.6 TABLET ORAL at 08:11

## 2022-11-17 RX ADMIN — HEPARIN SODIUM 5000 UNITS: 5000 INJECTION INTRAVENOUS; SUBCUTANEOUS at 02:11

## 2022-11-17 RX ADMIN — IPRATROPIUM BROMIDE AND ALBUTEROL SULFATE 3 ML: 2.5; .5 SOLUTION RESPIRATORY (INHALATION) at 12:11

## 2022-11-17 RX ADMIN — GABAPENTIN 100 MG: 100 CAPSULE ORAL at 08:11

## 2022-11-17 RX ADMIN — METOPROLOL TARTRATE 25 MG: 25 TABLET, FILM COATED ORAL at 10:11

## 2022-11-17 RX ADMIN — MUPIROCIN: 20 OINTMENT TOPICAL at 10:11

## 2022-11-17 RX ADMIN — METHOCARBAMOL 1000 MG: 500 TABLET ORAL at 10:11

## 2022-11-17 RX ADMIN — Medication 2 G: at 06:11

## 2022-11-17 RX ADMIN — METOPROLOL TARTRATE 25 MG: 25 TABLET, FILM COATED ORAL at 08:11

## 2022-11-17 RX ADMIN — MUPIROCIN: 20 OINTMENT TOPICAL at 08:11

## 2022-11-17 RX ADMIN — VANCOMYCIN HYDROCHLORIDE 125 MG: KIT at 11:11

## 2022-11-17 RX ADMIN — IPRATROPIUM BROMIDE AND ALBUTEROL SULFATE 3 ML: 2.5; .5 SOLUTION RESPIRATORY (INHALATION) at 08:11

## 2022-11-17 RX ADMIN — ACETAMINOPHEN 1000 MG: 325 TABLET ORAL at 02:11

## 2022-11-17 RX ADMIN — METHOCARBAMOL 1000 MG: 500 TABLET ORAL at 02:11

## 2022-11-17 RX ADMIN — PRAVASTATIN SODIUM 20 MG: 20 TABLET ORAL at 10:11

## 2022-11-17 RX ADMIN — METHOCARBAMOL 1000 MG: 100 INJECTION INTRAMUSCULAR; INTRAVENOUS at 05:11

## 2022-11-17 RX ADMIN — BUPROPION HYDROCHLORIDE 300 MG: 300 TABLET, FILM COATED, EXTENDED RELEASE ORAL at 08:11

## 2022-11-17 RX ADMIN — LOSARTAN POTASSIUM 25 MG: 25 TABLET, FILM COATED ORAL at 08:11

## 2022-11-17 RX ADMIN — ACETAMINOPHEN 1000 MG: 325 TABLET ORAL at 10:11

## 2022-11-17 RX ADMIN — GABAPENTIN 200 MG: 100 CAPSULE ORAL at 10:11

## 2022-11-17 RX ADMIN — POLYETHYLENE GLYCOL 3350 17 G: 17 POWDER, FOR SOLUTION ORAL at 08:11

## 2022-11-17 RX ADMIN — Medication 2 G: at 10:11

## 2022-11-17 RX ADMIN — VANCOMYCIN HYDROCHLORIDE 125 MG: KIT at 05:11

## 2022-11-17 RX ADMIN — HEPARIN SODIUM 5000 UNITS: 5000 INJECTION INTRAVENOUS; SUBCUTANEOUS at 05:11

## 2022-11-17 RX ADMIN — OXYCODONE HYDROCHLORIDE 10 MG: 10 TABLET ORAL at 06:11

## 2022-11-17 RX ADMIN — VANCOMYCIN HYDROCHLORIDE 125 MG: KIT at 06:11

## 2022-11-17 RX ADMIN — FLUOXETINE 40 MG: 20 CAPSULE ORAL at 08:11

## 2022-11-17 RX ADMIN — GABAPENTIN 200 MG: 100 CAPSULE ORAL at 02:11

## 2022-11-17 RX ADMIN — ACETAMINOPHEN 1000 MG: 325 TABLET ORAL at 05:11

## 2022-11-17 RX ADMIN — Medication 2 G: at 02:11

## 2022-11-17 RX ADMIN — HEPARIN SODIUM 5000 UNITS: 5000 INJECTION INTRAVENOUS; SUBCUTANEOUS at 10:11

## 2022-11-17 RX ADMIN — OXYCODONE HYDROCHLORIDE 10 MG: 10 TABLET ORAL at 08:11

## 2022-11-17 NOTE — SUBJECTIVE & OBJECTIVE
Interval History: NAEON. Continues to complain of pain, relieved with po medication. Increased gabapentin to 200 tid. Spoke with patient about utilizing pain medication. SBP labile with spikes to 190. Will continue to monitor, if pain controlled and BP still elevated will consider increasing losartan. Voiding spontaneously. No BM, passing flatus.     Medications:  Continuous Infusions:   sodium chloride 0.9% Stopped (11/14/22 1637)     Scheduled Meds:   acetaminophen  1,000 mg Oral Q8H    albuterol-ipratropium  3 mL Nebulization Q8H    buPROPion  300 mg Oral Daily    ceFAZolin (ANCEF) IVPB  2 g Intravenous Q8H    dicyclomine  20 mg Oral Q6H    FLUoxetine  40 mg Oral Daily    gabapentin  200 mg Oral TID    heparin (porcine)  5,000 Units Subcutaneous Q8H    losartan  25 mg Oral Daily    methocarbamoL  1,000 mg Oral Q8H    metoprolol tartrate  25 mg Oral BID    mupirocin   Nasal BID    polyethylene glycol  17 g Oral Daily    pravastatin  20 mg Oral Daily    senna-docusate 8.6-50 mg  2 tablet Oral Daily    vancomycin  125 mg Oral Q6H     PRN Meds:aluminum-magnesium hydroxide-simethicone, bisacodyL, hydrALAZINE, HYDROmorphone, melatonin, ondansetron, oxyCODONE, oxyCODONE, prochlorperazine     Review of Systems  Objective:     Weight: 71.9 kg (158 lb 8.2 oz)  Body mass index is 24.1 kg/m².  Vital Signs (Most Recent):  Temp: 98.2 °F (36.8 °C) (11/17/22 0750)  Pulse: 64 (11/17/22 0823)  Resp: 14 (11/17/22 0823)  BP: (!) 190/92 (11/17/22 0819)  SpO2: 98 % (11/17/22 0823)   Vital Signs (24h Range):  Temp:  [98 °F (36.7 °C)-98.5 °F (36.9 °C)] 98.2 °F (36.8 °C)  Pulse:  [60-66] 64  Resp:  [14-20] 14  SpO2:  [97 %-100 %] 98 %  BP: (124-192)/(60-92) 190/92                          Closed/Suction Drain 11/14/22 1742 Posterior Neck Accordion 10 Fr. (Active)   Site Description Unable to view 11/16/22 2000   Dressing Status Clean;Dry;Intact 11/16/22 2000   Dressing Intervention Integrity maintained 11/16/22 2000   Drainage  Sanguineous 11/16/22 2000   Status To bulb suction 11/16/22 2000   Output (mL) 100 mL 11/17/22 0550       Female External Urinary Catheter 11/15/22 0800 (Active)   Skin no redness;no breakdown 11/16/22 0800   Tolerance no signs/symptoms of discomfort 11/16/22 0800   Suction Continuous suction at 50 mmHg 11/16/22 0800   Date of last wick change 11/16/22 11/16/22 0800   Output (mL) 1200 mL 11/17/22 0550       Physical Exam    Neurosurgery Physical Exam  General: well developed, well nourished, no distress.   Head: normocephalic, atraumatic  Neurologic: Alert and oriented. Thought content appropriate.  GCS: Motor: 6/Verbal: 5/Eyes: 4 GCS Total: 15  Mental Status: Awake, Alert, Oriented x 4  Language: No aphasia  Speech: No dysarthria  Cranial nerves: face symmetric, tongue midline, CN II-XII grossly intact.   Eyes: pupils equal, round, reactive to light with accommodation, EOMI.   Pulmonary: normal respirations, no signs of respiratory distress  Abdomen: soft, non-distended, not tender to palpation  Skin: Skin is warm, dry and intact.  Sensory: intact to light touch throughout     Motor Strength:Moves all extremities spontaneously with good tone.  Full strength upper and lower extremities. No abnormal movements seen.         Reflexes:   Maria's: positive bilaterally     Incision: Clean, dry, prineo  intact. Skin edges well approximated. No surrounding erythema or edema. No drainage or TTP.     HV to gravity     Significant Labs:  Recent Labs   Lab 11/15/22  1208   *      K 4.8      CO2 22*   BUN 14   CREATININE 0.9   CALCIUM 8.1*     Recent Labs   Lab 11/17/22  0828   WBC 10.21   HGB 11.4*   HCT 34.7*        No results for input(s): LABPT, INR, APTT in the last 48 hours.  Microbiology Results (last 7 days)       ** No results found for the last 168 hours. **          All pertinent labs from the last 24 hours have been reviewed.    Significant Diagnostics:  No results found in the last 24  hours.

## 2022-11-17 NOTE — PT/OT/SLP PROGRESS
Physical Therapy Treatment    Patient Name:  Manisha Wynne   MRN:  [id number]    Recommendations:     Discharge Recommendations:  rehabilitation facility   Discharge Equipment Recommendations:  (TBD)   Barriers to discharge: Inaccessible home and Decreased caregiver support    Assessment:     Manisha Wynne is a 72 y.o. female admitted with a medical diagnosis of Cervical myelopathy.  She presents with the following impairments/functional limitations:  weakness, impaired endurance, impaired self care skills, impaired functional mobility, gait instability, impaired balance, decreased safety awareness Pt tolerated treatment well as she was able to gait train for two trials of 14 ft with RW and CGA. Pt will continue to benefit from skilled PT 4x/ wk in order to increase functional mobility. Pt when medically stable should discharge to rehab facility.    Rehab Prognosis: Good; patient would benefit from acute skilled PT services to address these deficits and reach maximum level of function.    Recent Surgery: Procedure(s) (LRB):  LAMINECTOMY, SPINE, CERVICAL, WITH POSTERIOR FUSION C3-C6 (N/A) 3 Days Post-Op    Plan:     During this hospitalization, patient to be seen 4 x/week to address the identified rehab impairments via gait training, therapeutic activities, therapeutic exercises, neuromuscular re-education and progress toward the following goals:    Plan of Care Expires:  12/14/22    Subjective     Chief Complaint: pain in neck, dizziness  Patient/Family Comments/goals: to return to prior level of function  Pain/Comfort:  Pain Rating 1:  (not rated)  Pain Rating Post-Intervention 1:  (not rated)      Objective:     Communicated with nurse prior to session.  Patient found supine with cervical collar, PureWick, hemovac (hep lock IV) upon PT entry to room.     General Precautions: Standard, fall   Orthopedic Precautions:spinal precautions   Braces: Cervical collar  Respiratory Status: Room air      Functional Mobility:  Bed Mobility:     Supine to Sit: stand by assistance  Transfers:     Sit to Stand:  contact guard assistance with rolling walker and verbal cueing for RW management.  Toilet Transfer: contact guard assistance with  rolling walker  using  Step Transfer and Pt needed verbal cueing for hand placement  Gait: Pt gait trained for two trails of 14 ft with RW and CGA. Pt had decreased stride length and increased dizziness      AM-PAC 6 CLICK MOBILITY  Turning over in bed (including adjusting bedclothes, sheets and blankets)?: 4  Sitting down on and standing up from a chair with arms (e.g., wheelchair, bedside commode, etc.): 3  Moving from lying on back to sitting on the side of the bed?: 3  Moving to and from a bed to a chair (including a wheelchair)?: 3  Need to walk in hospital room?: 3  Climbing 3-5 steps with a railing?: 2  Basic Mobility Total Score: 18       Treatment & Education:  Pt was verbally educated on PT POC and expressed verbal understanding    Patient left up in chair with all lines intact, call button in reach, and chair alarm on..    GOALS:   Multidisciplinary Problems       Physical Therapy Goals          Problem: Physical Therapy    Goal Priority Disciplines Outcome Goal Variances Interventions   Physical Therapy Goal     PT, PT/OT Ongoing, Progressing     Description: Goals to be met by: 22     Patient will increase functional independence with mobility by performin. Supine to sit with [place]  2. Sit to supine with [place]  3. Sit to stand transfer with Supervision  4. Bed to chair transfer with Supervision using Rolling Walker  5. Gait  x 50 feet with Stand-by Assistance using Rolling Walker.   6. Ascend/descend 10 stair with right Handrails [place] using No Assistive Device.                          Time Tracking:     PT Received On: 22  PT Start Time: 920     PT Stop Time: 947  PT Total Time (min): 27 min     Billable Minutes: Gait  Training 27min    Treatment Type: Treatment  PT/PTA: PT     PTA Visit Number: 0     11/17/2022

## 2022-11-17 NOTE — PLAN OF CARE
Problem: Adult Inpatient Plan of Care  Goal: Plan of Care Review  Outcome: Ongoing, Progressing     Problem: Adult Inpatient Plan of Care  Goal: Optimal Comfort and Wellbeing  Outcome: Ongoing, Progressing     Problem: Adult Inpatient Plan of Care  Goal: Readiness for Transition of Care  Outcome: Ongoing, Progressing  Problem: Skin Injury Risk Increased  Goal: Skin Health and Integrity  Outcome: Ongoing, Progressing        Problem: Infection  Goal: Absence of Infection Signs and Symptoms  Outcome: Ongoing, Progressing     Problem: Infection  Goal: Absence of Infection Signs and Symptoms  Outcome: Ongoing, Progressing  Patient has slept well this shift, meds as ordered. Aspen brace remains in place, can move in bed on her own but assistance times one for transfers. Bed in lowest position with SR's up times 3 and call light in place for safety  purposes.  VSS, flow sheets completed, see for assessments.

## 2022-11-17 NOTE — PROGRESS NOTES
[facility] - Neurosurgery ([facility])  Neurosurgery  Progress Note    Subjective:     History of Present Illness: Manisha Wynne is a 72 y.o. female with cervical stenosis and myelopathy. She has an MRI-non-compatible pacemaker in place. This will be due for replacement within the next few months.      Given the results of the myelogram, I expect that she would benefit from C3-C6 laminectomy and fusion, with undercutting of the C2 lamina. We had a lengthy and pleasant conversation about this procedure, including the associated hospitalization.       Post-Op Info:  Procedure(s) (LRB):  LAMINECTOMY, SPINE, CERVICAL, WITH POSTERIOR FUSION C3-C6 (N/A)   3 Days Post-Op     Interval History: NAEON. Continues to complain of pain, relieved with po medication. Increased gabapentin to 200 tid. Spoke with patient about utilizing pain medication. SBP labile with spikes to 190. Will continue to monitor, if pain controlled and BP still elevated will consider increasing losartan. Voiding spontaneously. No BM, passing flatus.     Medications:  Continuous Infusions:   sodium chloride 0.9% Stopped (11/14/22 1637)     Scheduled Meds:   acetaminophen  1,000 mg Oral Q8H    albuterol-ipratropium  3 mL Nebulization Q8H    buPROPion  300 mg Oral Daily    ceFAZolin (ANCEF) IVPB  2 g Intravenous Q8H    dicyclomine  20 mg Oral Q6H    FLUoxetine  40 mg Oral Daily    gabapentin  200 mg Oral TID    heparin (porcine)  5,000 Units Subcutaneous Q8H    losartan  25 mg Oral Daily    methocarbamoL  1,000 mg Oral Q8H    metoprolol tartrate  25 mg Oral BID    mupirocin   Nasal BID    polyethylene glycol  17 g Oral Daily    pravastatin  20 mg Oral Daily    senna-docusate 8.6-50 mg  2 tablet Oral Daily    vancomycin  125 mg Oral Q6H     PRN Meds:aluminum-magnesium hydroxide-simethicone, bisacodyL, hydrALAZINE, HYDROmorphone, melatonin, ondansetron, oxyCODONE, oxyCODONE, prochlorperazine     Review of Systems  Objective:     Weight:  71.9 kg (158 lb 8.2 oz)  Body mass index is 24.1 kg/m².  Vital Signs (Most Recent):  Temp: 98.2 °F (36.8 °C) (11/17/22 0750)  Pulse: 64 (11/17/22 0823)  Resp: 14 (11/17/22 0823)  BP: (!) 190/92 (11/17/22 0819)  SpO2: 98 % (11/17/22 0823)   Vital Signs (24h Range):  Temp:  [98 °F (36.7 °C)-98.5 °F (36.9 °C)] 98.2 °F (36.8 °C)  Pulse:  [60-66] 64  Resp:  [14-20] 14  SpO2:  [97 %-100 %] 98 %  BP: (124-192)/(60-92) 190/92                          Closed/Suction Drain 11/14/22 1742 Posterior Neck Accordion 10 Fr. (Active)   Site Description Unable to view 11/16/22 2000   Dressing Status Clean;Dry;Intact 11/16/22 2000   Dressing Intervention Integrity maintained 11/16/22 2000   Drainage Sanguineous 11/16/22 2000   Status To bulb suction 11/16/22 2000   Output (mL) 100 mL 11/17/22 0550       Female External Urinary Catheter 11/15/22 0800 (Active)   Skin no redness;no breakdown 11/16/22 0800   Tolerance no signs/symptoms of discomfort 11/16/22 0800   Suction Continuous suction at 50 mmHg 11/16/22 0800   Date of last wick change 11/16/22 11/16/22 0800   Output (mL) 1200 mL 11/17/22 0550       Physical Exam    Neurosurgery Physical Exam  General: well developed, well nourished, no distress.   Head: normocephalic, atraumatic  Neurologic: Alert and oriented. Thought content appropriate.  GCS: Motor: 6/Verbal: 5/Eyes: 4 GCS Total: 15  Mental Status: Awake, Alert, Oriented x 4  Language: No aphasia  Speech: No dysarthria  Cranial nerves: face symmetric, tongue midline, CN II-XII grossly intact.   Eyes: pupils equal, round, reactive to light with accommodation, EOMI.   Pulmonary: normal respirations, no signs of respiratory distress  Abdomen: soft, non-distended, not tender to palpation  Skin: Skin is warm, dry and intact.  Sensory: intact to light touch throughout     Motor Strength:Moves all extremities spontaneously with good tone.  Full strength upper and lower extremities. No abnormal movements seen.         Reflexes:  Postop Diagnosis: same   Maria's: positive bilaterally     Incision: Clean, dry, prineo  intact. Skin edges well approximated. No surrounding erythema or edema. No drainage or TTP.     HV to gravity     Significant Labs:  Recent Labs   Lab 11/15/22  1208   *      K 4.8      CO2 22*   BUN 14   CREATININE 0.9   CALCIUM 8.1*     Recent Labs   Lab 11/17/22  0828   WBC 10.21   HGB 11.4*   HCT 34.7*        No results for input(s): LABPT, INR, APTT in the last 48 hours.  Microbiology Results (last 7 days)       ** No results found for the last 168 hours. **          All pertinent labs from the last 24 hours have been reviewed.    Significant Diagnostics:  No results found in the last 24 hours.      Assessment/Plan:     * Cervical myelopathy  Manisha Wynne is a 72 y.o. female with cervical myelopathy s/p C3-C6 posterior cervical fusion on 11/14 by Dr. Cheek     - Neuro stable on exam  - Incision with dressing intact  - HV drain to gravity. 330 cc output. Keep in place. Abx while in place.  - Post op XR satisfactory  - C collar to be worn when upright and OOB  - Pain control- added oxy 10 q 4 hours prn severe pain. Increased gabapentin to 200 mg tid. Continue scheduled robaxin and tylenol.   - Elevated SBP: continue home meds. Labile SBP with elevations to 190. Most likely pain related, pain regimen adjusted. If no improvement will consider increasing losartan.   - PT/OT  - Duonebs/CPT q 8 hours for atelectasis prophylaxis   - Continue oral vancomycin in the post operative period for C diff prophylaxis. Patient has a history of severe C diff infection.   - Miralax daily.  - SQH    Discussed with KALEB Vizcaino-C  Neurosurgery  [facility] Villal[facility]os - Neurosurgery ([facility])

## 2022-11-17 NOTE — PLAN OF CARE
Problem: Physical Therapy  Goal: Physical Therapy Goal  Description: Goals to be met by: 22     Patient will increase functional independence with mobility by performin. Supine to sit with [place]  2. Sit to supine with [place]  3. Sit to stand transfer with Supervision  4. Bed to chair transfer with Supervision using Rolling Walker  5. Gait  x 50 feet with Stand-by Assistance using Rolling Walker.   6. Ascend/descend 10 stair with right Handrails [place] using No Assistive Device.     Outcome: Ongoing, Progressing   Goals remain appropriate 2022

## 2022-11-17 NOTE — PLAN OF CARE
[facility] Villalobos - Neurosurgery ([facility])  Discharge Reassessment    Primary Care Provider: Iris Blue MD    Expected Discharge Date: 11/18/2022    Patient is not medically ready for discharge today.  Referral sent to [facility] who indicates possible bed for Friday.  Additional referrals to be sent to nursing home snf.    Interval History: NAEON. Continues to complain of pain, relieved with po medication. Increased gabapentin to 200 tid. Spoke with patient about utilizing pain medication. SBP labile with spikes to 190. Will continue to monitor, if pain controlled and BP still elevated will consider increasing losartan. Voiding spontaneously. No BM, passing flatus.     Reassessment (most recent)       Discharge Reassessment - 11/17/22 1141          Discharge Reassessment    Assessment Type Discharge Planning Reassessment     Did the patient's condition or plan change since previous assessment? Yes     Discharge Plan discussed with: Patient     Communicated CARO with patient/caregiver Yes     Discharge Plan A Skilled Nursing Facility     Discharge Plan B Home Health     DME Needed Upon Discharge  other (see comments)   tbd    Discharge Barriers Identified None     Why the patient remains in the hospital Requires continued medical care        Post-Acute Status    Post-Acute Authorization Placement     Post-Acute Placement Status Pending post-acute provider review/more information requested     Discharge Delays None known at this time

## 2022-11-17 NOTE — ASSESSMENT & PLAN NOTE
Manisha Wynne is a 72 y.o. female with cervical myelopathy s/p C3-C6 posterior cervical fusion on 11/14 by Dr. Cheek     - Neuro stable on exam  - Incision with dressing intact  - HV drain to gravity. 330 cc output. Keep in place. Abx while in place.  - Post op XR satisfactory  - C collar to be worn when upright and OOB  - Pain control- added oxy 10 q 4 hours prn severe pain. Increased gabapentin to 200 mg tid. Continue scheduled robaxin and tylenol.   - Elevated SBP: continue home meds. Labile SBP with elevations to 190. Most likely pain related, pain regimen adjusted. If no improvement will consider increasing losartan.   - PT/OT  - Duonebs/CPT q 8 hours for atelectasis prophylaxis   - Continue oral vancomycin in the post operative period for C diff prophylaxis. Patient has a history of severe C diff infection.   - Miralax daily.  - SQH    Discussed with Dr. Cheek

## 2022-11-18 PROCEDURE — 25000242 PHARM REV CODE 250 ALT 637 W/ HCPCS: Performed by: PHYSICIAN ASSISTANT

## 2022-11-18 PROCEDURE — 94799 UNLISTED PULMONARY SVC/PX: CPT

## 2022-11-18 PROCEDURE — 99900035 HC TECH TIME PER 15 MIN (STAT)

## 2022-11-18 PROCEDURE — 94640 AIRWAY INHALATION TREATMENT: CPT

## 2022-11-18 PROCEDURE — 99024 PR POST-OP FOLLOW-UP VISIT: ICD-10-PCS | Mod: ,,, | Performed by: PHYSICIAN ASSISTANT

## 2022-11-18 PROCEDURE — 94664 DEMO&/EVAL PT USE INHALER: CPT

## 2022-11-18 PROCEDURE — 94761 N-INVAS EAR/PLS OXIMETRY MLT: CPT

## 2022-11-18 PROCEDURE — 94667 MNPJ CHEST WALL 1ST: CPT

## 2022-11-18 PROCEDURE — 25000003 PHARM REV CODE 250: Performed by: PHYSICIAN ASSISTANT

## 2022-11-18 PROCEDURE — 25000003 PHARM REV CODE 250: Performed by: STUDENT IN AN ORGANIZED HEALTH CARE EDUCATION/TRAINING PROGRAM

## 2022-11-18 PROCEDURE — 99222 PR INITIAL HOSPITAL CARE,LEVL II: ICD-10-PCS | Mod: ,,, | Performed by: NURSE PRACTITIONER

## 2022-11-18 PROCEDURE — 63600175 PHARM REV CODE 636 W HCPCS: Performed by: STUDENT IN AN ORGANIZED HEALTH CARE EDUCATION/TRAINING PROGRAM

## 2022-11-18 PROCEDURE — 99024 POSTOP FOLLOW-UP VISIT: CPT | Mod: ,,, | Performed by: PHYSICIAN ASSISTANT

## 2022-11-18 PROCEDURE — 94760 N-INVAS EAR/PLS OXIMETRY 1: CPT

## 2022-11-18 PROCEDURE — 94668 MNPJ CHEST WALL SBSQ: CPT

## 2022-11-18 PROCEDURE — 99222 1ST HOSP IP/OBS MODERATE 55: CPT | Mod: ,,, | Performed by: NURSE PRACTITIONER

## 2022-11-18 PROCEDURE — 11000001 HC ACUTE MED/SURG PRIVATE ROOM

## 2022-11-18 PROCEDURE — 63600175 PHARM REV CODE 636 W HCPCS: Performed by: PHYSICIAN ASSISTANT

## 2022-11-18 PROCEDURE — 97530 THERAPEUTIC ACTIVITIES: CPT

## 2022-11-18 PROCEDURE — 97535 SELF CARE MNGMENT TRAINING: CPT

## 2022-11-18 RX ORDER — LOSARTAN POTASSIUM 50 MG/1
50 TABLET ORAL DAILY
Status: DISCONTINUED | OUTPATIENT
Start: 2022-11-18 | End: 2022-11-23 | Stop reason: HOSPADM

## 2022-11-18 RX ORDER — GLYCERIN 1 G/1
1 SUPPOSITORY RECTAL DAILY PRN
Status: DISCONTINUED | OUTPATIENT
Start: 2022-11-18 | End: 2022-11-23 | Stop reason: HOSPADM

## 2022-11-18 RX ADMIN — METOPROLOL TARTRATE 25 MG: 25 TABLET, FILM COATED ORAL at 08:11

## 2022-11-18 RX ADMIN — MUPIROCIN: 20 OINTMENT TOPICAL at 08:11

## 2022-11-18 RX ADMIN — SENNOSIDES AND DOCUSATE SODIUM 2 TABLET: 50; 8.6 TABLET ORAL at 08:11

## 2022-11-18 RX ADMIN — HEPARIN SODIUM 5000 UNITS: 5000 INJECTION INTRAVENOUS; SUBCUTANEOUS at 05:11

## 2022-11-18 RX ADMIN — ACETAMINOPHEN 1000 MG: 325 TABLET ORAL at 05:11

## 2022-11-18 RX ADMIN — GABAPENTIN 200 MG: 100 CAPSULE ORAL at 08:11

## 2022-11-18 RX ADMIN — VANCOMYCIN HYDROCHLORIDE 125 MG: KIT at 06:11

## 2022-11-18 RX ADMIN — VANCOMYCIN HYDROCHLORIDE 125 MG: KIT at 05:11

## 2022-11-18 RX ADMIN — BUPROPION HYDROCHLORIDE 300 MG: 300 TABLET, FILM COATED, EXTENDED RELEASE ORAL at 08:11

## 2022-11-18 RX ADMIN — GLYCERIN 1 SUPPOSITORY: 2 SUPPOSITORY RECTAL at 05:11

## 2022-11-18 RX ADMIN — METHOCARBAMOL 1000 MG: 500 TABLET ORAL at 08:11

## 2022-11-18 RX ADMIN — IPRATROPIUM BROMIDE AND ALBUTEROL SULFATE 3 ML: 2.5; .5 SOLUTION RESPIRATORY (INHALATION) at 04:11

## 2022-11-18 RX ADMIN — Medication 2 G: at 02:11

## 2022-11-18 RX ADMIN — IPRATROPIUM BROMIDE AND ALBUTEROL SULFATE 3 ML: 2.5; .5 SOLUTION RESPIRATORY (INHALATION) at 08:11

## 2022-11-18 RX ADMIN — GABAPENTIN 200 MG: 100 CAPSULE ORAL at 03:11

## 2022-11-18 RX ADMIN — HEPARIN SODIUM 5000 UNITS: 5000 INJECTION INTRAVENOUS; SUBCUTANEOUS at 02:11

## 2022-11-18 RX ADMIN — PRAVASTATIN SODIUM 20 MG: 20 TABLET ORAL at 08:11

## 2022-11-18 RX ADMIN — OXYCODONE HYDROCHLORIDE 10 MG: 10 TABLET ORAL at 08:11

## 2022-11-18 RX ADMIN — ACETAMINOPHEN 1000 MG: 325 TABLET ORAL at 08:11

## 2022-11-18 RX ADMIN — ACETAMINOPHEN 1000 MG: 325 TABLET ORAL at 02:11

## 2022-11-18 RX ADMIN — FLUOXETINE 40 MG: 20 CAPSULE ORAL at 08:11

## 2022-11-18 RX ADMIN — VANCOMYCIN HYDROCHLORIDE 125 MG: KIT at 12:11

## 2022-11-18 RX ADMIN — METHOCARBAMOL 1000 MG: 500 TABLET ORAL at 05:11

## 2022-11-18 RX ADMIN — POLYETHYLENE GLYCOL 3350 17 G: 17 POWDER, FOR SOLUTION ORAL at 08:11

## 2022-11-18 RX ADMIN — HEPARIN SODIUM 5000 UNITS: 5000 INJECTION INTRAVENOUS; SUBCUTANEOUS at 08:11

## 2022-11-18 RX ADMIN — IPRATROPIUM BROMIDE AND ALBUTEROL SULFATE 3 ML: 2.5; .5 SOLUTION RESPIRATORY (INHALATION) at 12:11

## 2022-11-18 RX ADMIN — Medication 2 G: at 06:11

## 2022-11-18 RX ADMIN — VANCOMYCIN HYDROCHLORIDE 125 MG: KIT at 11:11

## 2022-11-18 RX ADMIN — Medication 2 G: at 11:11

## 2022-11-18 RX ADMIN — HYDRALAZINE HYDROCHLORIDE 10 MG: 20 INJECTION, SOLUTION INTRAMUSCULAR; INTRAVENOUS at 06:11

## 2022-11-18 RX ADMIN — LOSARTAN POTASSIUM 50 MG: 50 TABLET, FILM COATED ORAL at 08:11

## 2022-11-18 RX ADMIN — METHOCARBAMOL 1000 MG: 500 TABLET ORAL at 02:11

## 2022-11-18 NOTE — PT/OT/SLP PROGRESS
Occupational Therapy   Treatment    Name: Manisha Wynne  MRN: [id number]  Admitting Diagnosis:  Cervical myelopathy  4 Days Post-Op    Recommendations:     Discharge Recommendations: rehabilitation facility  Discharge Equipment Recommendations:  other (see comments) (TBD)  Barriers to discharge:  Decreased caregiver support    Assessment:     Manisha Wynne is a 72 y.o. female with a medical diagnosis of Cervical myelopathy.Performance deficits affecting function are weakness, impaired self care skills, impaired balance, decreased coordination, decreased safety awareness, impaired endurance.     Pt tolerated session well and recalled 3/3 spinal precautions correctly. Pt req'd stand-by assist w/ fxnl bed mobility, stand-by assist-CGA w/ RW for fxnl transfers and fxnl mobility, and Min A to angie gown while seated EOB, Max A to angie b/l socks while seated EOB, and SBA for pericare with 3-in-1 commode over toilet in bathroom on this date as further detailed below. Pt is progressing towards fxnl goals, D/C rec to Inpatient Rehab. Cont POC.   Plan:     Patient to be seen 4 x/week to address the above listed problems via self-care/home management, therapeutic activities, therapeutic exercises, neuromuscular re-education  Plan of Care Expires: 12/15/22  Plan of Care Reviewed with: patient    Subjective     Pain/Comfort:  Pain Rating 1: 0/10  Pain Addressed 1: Reposition, Distraction  Pain Rating Post-Intervention 1: 0/10    Objective:     Communicated with: nurse prior to session.  Patient found supine with cervical collar, PureWick, hemovac, telemetry upon OT entry to room.    General Precautions: Standard, fall   Orthopedic Precautions:spinal precautions   Braces: Cervical collar  Respiratory Status: Room air    Bed Mobility:    Patient completed Rolling/Turning to Right with stand by assistance  Patient completed Scooting/Bridging with stand by assistance  Patient completed Supine to Sit with stand by  assistance   HOB elevated and INC time required 2/2 pain with movement    Functional Mobility/Transfers:  Patient completed Sit <> Stand Transfer with contact guard assistance  with  rolling walker   Patient completed Bed <> Chair Transfer using Step Transfer technique with stand by assistance with rolling walker  Patient completed Toilet Transfer Step Transfer technique with contact guard assistance with  rolling walker  Functional Mobility: Pt engaged in functional mobility to simulate household/community distances with CGA x 1 with RW  in order to maximize functional activity tolerance required for engagement in occupations of choice.      Activities of Daily Living:  Grooming: supervision while standing at sink to wash hands  Upper Body Dressing: minimum assistance while seated EOB to angie gown  Lower Body Dressing: maximal assistance while seated EOB to angie b/l socks  Toileting: stand by assistance for safety only while seated on 3-in-1 over commode in bathroom   Pt able to complete pericare while seated on toilet with supervision      [facility] 6 Click ADL: 19    Treatment & Education:  Pt educated on role of OT, POC, and goals for therapy.    POC was dicussed with patient/caregiver, who was included in its development and is in agreement with the identified goals and treatment plan.   Patient and family aware of patient's deficits and therapy progression.   Time provided for therapeutic counseling and discussion of health disposition.   Educated on importance of EOB/OOB mobility, maintaining routine, sitting up in chair, and maximizing independence with ADLs during admission   Pt completed ADLs and functional mobility for treatment session as noted above   Pt/caregiver verbalized understanding and expressed no further concerns/questions.      Patient left up in chair with all lines intact, call button in reach, and chair alarm on    GOALS:   Multidisciplinary Problems       Occupational Therapy Goals           Problem: Occupational Therapy    Goal Priority Disciplines Outcome Interventions   Occupational Therapy Goal     OT, PT/OT Ongoing, Progressing    Description: Goals to be met by: 11/29/22     Patient will increase functional independence with ADLs by performing:    UE Dressing with Stand-by Assistance.  LE Dressing with Stand-by Assistance and Assistive Devices as needed.  Grooming while standing at sink with Stand-by Assistance.  Toileting from toilet with Stand-by Assistance for hygiene and clothing management.   Toilet transfer to toilet with Stand-by Assistance.                         Time Tracking:     OT Date of Treatment: 11/18/22  OT Start Time: 0931  OT Stop Time: 1000  OT Total Time (min): 29 min    Billable Minutes:Self Care/Home Management 20  Therapeutic Activity 9    OT/DESTINY: OT     DESTINY Visit Number: 0    11/18/2022

## 2022-11-18 NOTE — PLAN OF CARE
Patient rec had changed to Rehab.  Auth had been provided for SNF and was rescinded once the rec changed.  MD wants patient to go to Rehab.  Patient accepted by [facility] and aniceto is pending.   11/18/22 1606   Post-Acute Status   Post-Acute Authorization Placement   Post-Acute Placement Status Pending payor review/awaiting authorization (if required)

## 2022-11-18 NOTE — PLAN OF CARE
Problem: Adult Inpatient Plan of Care  Goal: Plan of Care Review  Outcome: Ongoing, Progressing     Problem: Adult Inpatient Plan of Care  Goal: Patient-Specific Goal (Individualized)  Outcome: Ongoing, Progressing     Problem: Adult Inpatient Plan of Care  Goal: Optimal Comfort and Wellbeing  Outcome: Ongoing, Progressing     Problem: Adult Inpatient Plan of Care  Goal: Readiness for Transition of Care  Outcome: Ongoing, Progressing     Problem: Infection  Goal: Absence of Infection Signs and Symptoms  Outcome: Ongoing, Progressing     Problem: Skin Injury Risk Increased  Goal: Skin Health and Integrity  Outcome: Ongoing, Progressing   Patient remains asleep at this time with no s/sx of distress, call light in place. Bed in lowest position with bed alarm on and SR's up times 3 for safety. She is Aox4 and uses the call light for assistance. Continues with [place] Collar at tall times as ordered, re educated on the importance of shifting her weight while in bed or upright in chair so she does not cause a pressure area. Pure wick in place. Continent of bowels. IV abt's continue as ordered and is afebrile. SCD's in place

## 2022-11-18 NOTE — ASSESSMENT & PLAN NOTE
-s/p C3-C6 posterior cervical fusion on 11/14.  -HV in place.  -C-Collar when OOB and upright as per NSGY.  -Pain controlled per current regimen.   -C-diff PPX with Vanc PO currently for Hx of severe C-diff infection.  -Post Op BM pending.

## 2022-11-18 NOTE — SUBJECTIVE & OBJECTIVE
Past Medical History:   Diagnosis Date    Abnormal Pap smear     Atrial fibrillation     AV block, 1st degree 07/25/2012    C. difficile diarrhea     RESOLVED    Cataract     Depression 07/24/2012    Facet arthritis of lumbar region 03/31/2015    Falls     3 falls in the last 6 mos--noted 6/19/19    Hyperlipidemia     Hypertension 07/24/2012    Neuropathy     Other specified cardiac dysrhythmias(427.89)     Sleep apnea     Syncope 07/24/2012    Tremors of nervous system     hands bilaterally     Past Surgical History:   Procedure Laterality Date    APPLICATION OF CARTILAGE GRAFT Bilateral 12/19/2019    Procedure: APPLICATION, CARTILAGE GRAFT AURICULAR JEZ;  Surgeon: Martinez Flores III, MD;  Location: [facility];  Service: ENT;  Laterality: Bilateral;    CARDIAC PACEMAKER PLACEMENT  09/07/2012    [id number]    CATARACT EXTRACTION W/  INTRAOCULAR LENS IMPLANT Right 5/16/2022    Procedure: EXTRACTION, CATARACT, WITH IOL INSERTION;  Surgeon: Ines Aguilera MD;  Location: [facility];  Service: Ophthalmology;  Laterality: Right;  Catalys     CATARACT EXTRACTION W/  INTRAOCULAR LENS IMPLANT Left 6/20/2022    Procedure: EXTRACTION, CATARACT, WITH IOL INSERTION;  Surgeon: Ines Aguilera MD;  Location: [facility];  Service: Ophthalmology;  Laterality: Left;  Catalys     DILATION AND CURETTAGE OF UTERUS      Hx of precancerous cells?    ENDOSCOPIC ULTRASOUND OF UPPER GASTROINTESTINAL TRACT N/A 7/5/2019    Procedure: ULTRASOUND, UPPER GI TRACT, ENDOSCOPIC;  Surgeon: Kev Calderon MD;  Location: [facility] (2ND FLR);  Service: Endoscopy;  Laterality: N/A;  Pacemaker-Adam   appt confirmed-rb    MYELOGRAPHY N/A 5/4/2021    Procedure: Myelogram  CERVICAL, THORACIC AND LUMBAR;  Surgeon: [facility] Diagnostic Provider;  Location: [facility] 2ND FLR;  Service: Radiology;  Laterality: N/A;    NASAL SEPTOPLASTY N/A 12/19/2019    Procedure: SEPTOPLASTY, NOSE;  Surgeon: Martinez Flores III, MD;  Location: [facility];  Service: ENT;   Laterality: N/A;  FOLLOW DR SALVATORE KIMBROUGH PROTOCOL    OPEN REDUCTION AND INTERNAL FIXATION (ORIF) OF FRACTURE OF DISTAL RADIUS Left 1/24/2020    Procedure: ORIF, FRACTURE, RADIUS, DISTAL-left;  Surgeon: Ellen Moe MD;  Location: [facility];  Service: Orthopedics;  Laterality: Left;    POSTERIOR FUSION OF CERVICAL SPINE WITH LAMINECTOMY N/A 11/14/2022    Procedure: LAMINECTOMY, SPINE, CERVICAL, WITH POSTERIOR FUSION C3-C6;  Surgeon: Nicolette Cheek MD;  [address];  Service: Neurosurgery;  Laterality: N/A;  TORONTO III, ASA III, BLOOD TYPE AND SCREEN, NEUROMONITORING EMG SEP MEP, BRACE/HALO [place], BED REGULAR BED, HEADREST [place], POSITION PRONE, SPECIAL EQUIPMENT NIKI [place]DLE[place], RADIOLOGY C-ARM, EXT. BONE STIMULATOR BIOMET    REPLACEMENT OF PACEMAKER GENERATOR Left 10/7/2022    Procedure: REPLACEMENT, PACEMAKER GENERATOR;  Surgeon: John Sheets MD;  Location: [facility] EP LAB;  Service: Cardiology;  Laterality: Left;  YAS, SSS, AVB, Dual PPM Gen Change,SJM, MAC ,NC, 3 prep,** Adam dcPPM in situ**    SURGICAL REMOVAL OF NASAL TURBINATE Bilateral 12/19/2019    Procedure: EXCISION, NASAL TURBINATE;  Surgeon: Martinez Flores III, MD;  Location: [facility];  Service: ENT;  Laterality: Bilateral;    TONSILLECTOMY      WISDOM TOOTH EXTRACTION       Review of patient's allergies indicates:  No Known Allergies    Scheduled Medications:    acetaminophen  1,000 mg Oral Q8H    albuterol-ipratropium  3 mL Nebulization Q8H    buPROPion  300 mg Oral Daily    ceFAZolin (ANCEF) IVPB  2 g Intravenous Q8H    dicyclomine  20 mg Oral Q6H    FLUoxetine  40 mg Oral Daily    gabapentin  200 mg Oral TID    heparin (porcine)  5,000 Units Subcutaneous Q8H    losartan  50 mg Oral Daily    methocarbamoL  1,000 mg Oral Q8H    metoprolol tartrate  25 mg Oral BID    mupirocin   Nasal BID    polyethylene glycol  17 g Oral Daily    pravastatin  20 mg Oral Daily    senna-docusate 8.6-50 mg  2 tablet Oral Daily    vancomycin   125 mg Oral Q6H       PRN Medications: aluminum-magnesium hydroxide-simethicone, glycerin adult, hydrALAZINE, melatonin, ondansetron, oxyCODONE, oxyCODONE, prochlorperazine    Family History    None       Tobacco Use    Smoking status: Former     Packs/day: 0.25     Years: 15.00     Pack years: 3.75     Types: Cigarettes     Quit date: 1982     Years since quittin.3    Smokeless tobacco: Former   Substance and Sexual Activity    Alcohol use: Yes     Comment: no daily or heavy use, ~4 times per month    Drug use: No    Sexual activity: Not Currently     Partners: Male     Review of Systems   Constitutional:  Positive for activity change.   Respiratory:  Negative for cough and shortness of breath.    Cardiovascular:  Negative for chest pain and leg swelling.   Gastrointestinal:  Positive for constipation. Negative for abdominal distention.   Musculoskeletal:  Positive for gait problem.   Neurological:  Positive for weakness. Negative for speech difficulty.   Psychiatric/Behavioral:  Negative for agitation, behavioral problems and confusion.    Objective:     Vital Signs (Most Recent):  Temp: 98 °F (36.7 °C) (22 0745)  Pulse: 70 (22 0841)  Resp: 16 (22 0855)  BP: 130/64 (22 0841)  SpO2: 96 % (22 0821)    Vital Signs (24h Range):  Temp:  [97.9 °F (36.6 °C)-99.3 °F (37.4 °C)] 98 °F (36.7 °C)  Pulse:  [66-84] 70  Resp:  [14-18] 16  SpO2:  [94 %-100 %] 96 %  BP: (130-190)/(60-81) 130/64     Body mass index is 24.1 kg/m².    Physical Exam  Vitals and nursing note reviewed.   Constitutional:       General: She is awake.      Appearance: Normal appearance.   HENT:      Head: Normocephalic and atraumatic.   Eyes:      Extraocular Movements: Extraocular movements intact.   Neck:      Comments: C-collar in place.   Pulmonary:      Effort: No respiratory distress.   Abdominal:      General: There is no distension.   Musculoskeletal:         General: Normal range of motion.   Skin:      General: Skin is warm and dry.      Capillary Refill: Capillary refill takes 2 to 3 seconds.   Neurological:      General: No focal deficit present.      Mental Status: She is alert and oriented to person, place, and time.      GCS: GCS eye subscore is 4. GCS verbal subscore is 5. GCS motor subscore is 6.      Motor: Weakness present.      Coordination: Impaired rapid alternating movements.      Gait: Gait abnormal.   Psychiatric:         Attention and Perception: Attention normal.         Mood and Affect: Mood and affect normal.         Speech: Speech normal.         Behavior: Behavior normal. Behavior is cooperative.         Thought Content: Thought content normal.         Cognition and Memory: Cognition and memory normal.     NEUROLOGICAL EXAMINATION:     MENTAL STATUS   Oriented to person, place, and time.   Speech: speech is normal     Diagnostic Results: Labs: Reviewed

## 2022-11-18 NOTE — CONSULTS
[facility] - Neurosurgery ([facility])  Physical Medicine & Rehab  Consult Note    Patient Name: Manisha Wynne  MRN: [id number]  Admission Date: 11/14/2022  Hospital Length of Stay: 4 days  Attending Physician: Nicolette Cheek MD  Consults  Subjective:     Principal Problem: Cervical myelopathy    HPI: Per chart review, Manisha Wynne is a 72 y.o. female with cervical stenosis and myelopathy. She has an MRI-non-compatible pacemaker in place. This will be due for replacement within the next few months. Pt is s/p C3-C6 posterior cervical fusion on 11/14.         Functional History: Patient lives alone  in a duplex  with 10 steps  to enter.  Prior to admission, pt was Mod I with cane use for mobility.  DME: Cane.        Hospital Course: OT- 11/16    Bed Mobility:     Patient completed Rolling/Turning to Right with minimum assistance   Patient completed Scooting/Bridging with minimum assistance   Patient completed Supine to Sit with moderate assistance   Balance: Pt sat EOB ~5 minutes with close SBA      Functional Mobility/Transfers:   Patient completed Sit <> Stand Transfer with moderate assistance  with  no assistive device and Th standing in front of pt for support    Patient completed Bed <> Chair Transfer using Stand Pivot technique with minimum assistance with no assistive device   Pt completed standing marching ~45 seconds w/ mod A for balance, working to increase standing tolerance needed for ADLs and functional mobility.     Activities of Daily Living:   Feeding:  Independent after set-up assistance     Grooming: stand by assistance to comb hair, wash face, and complete oral cares while sitting up in chair   Lower Body Dressing: maximal assistance to adjust B socks; pt unable to complete figure-4 due to pain    PT-11/17    Functional Mobility:   Bed Mobility:      Supine to Sit: stand by assistance   Transfers:      Sit to Stand:  contact guard assistance with rolling walker and verbal  cueing for RW management.   Toilet Transfer: contact guard assistance with  rolling walker  using  Step Transfer and Pt needed verbal cueing for hand placement   Gait: Pt gait trained for two trails of 14 ft with RW and CGA. Pt had decreased stride length and increased dizziness            Past Medical History:   Diagnosis Date    Abnormal Pap smear     Atrial fibrillation     AV block, 1st degree 07/25/2012    C. difficile diarrhea     RESOLVED    Cataract     Depression 07/24/2012    Facet arthritis of lumbar region 03/31/2015    Falls     3 falls in the last 6 mos--noted 6/19/19    Hyperlipidemia     Hypertension 07/24/2012    Neuropathy     Other specified cardiac dysrhythmias(427.89)     Sleep apnea     Syncope 07/24/2012    Tremors of nervous system     hands bilaterally     Past Surgical History:   Procedure Laterality Date    APPLICATION OF CARTILAGE GRAFT Bilateral 12/19/2019    Procedure: APPLICATION, CARTILAGE GRAFT AURICULAR JEZ;  Surgeon: Martinez Florse III, MD;  Location: [facility];  Service: ENT;  Laterality: Bilateral;    CARDIAC PACEMAKER PLACEMENT  09/07/2012    [id number]    CATARACT EXTRACTION W/  INTRAOCULAR LENS IMPLANT Right 5/16/2022    Procedure: EXTRACTION, CATARACT, WITH IOL INSERTION;  Surgeon: Ines Aguilera MD;  Location: [facility];  Service: Ophthalmology;  Laterality: Right;  Catalys     CATARACT EXTRACTION W/  INTRAOCULAR LENS IMPLANT Left 6/20/2022    Procedure: EXTRACTION, CATARACT, WITH IOL INSERTION;  Surgeon: Ines Aguilera MD;  Location: [facility];  Service: Ophthalmology;  Laterality: Left;  Catalys     DILATION AND CURETTAGE OF UTERUS      Hx of precancerous cells?    ENDOSCOPIC ULTRASOUND OF UPPER GASTROINTESTINAL TRACT N/A 7/5/2019    Procedure: ULTRASOUND, UPPER GI TRACT, ENDOSCOPIC;  Surgeon: Kev Calderon MD;  Location: [facility] ENDO ([address] [place] [address]);  Service: Endoscopy;  Laterality: N/A;  Pacemaker-Adam   appt confirmed-rb    MYELOGRAPHY  N/A 5/4/2021    Procedure: Myelogram  CERVICAL, THORACIC AND LUMBAR;  Surgeon: Le[facility] Diagnostic Provider;  [address];  Service: Radiology;  Laterality: N/A;    NASAL SEPTOPLASTY N/A 12/19/2019    Procedure: SEPTOPLASTY, NOSE;  Surgeon: Martinez Flores III, MD;  Location: [facility];  Service: ENT;  Laterality: N/A;  FOLLOW DR SALVATORE KIMBROUGH PROTOCOL    OPEN REDUCTION AND INTERNAL FIXATION (ORIF) OF FRACTURE OF DISTAL RADIUS Left 1/24/2020    Procedure: ORIF, FRACTURE, RADIUS, DISTAL-left;  Surgeon: Ellen Moe MD;  Location: [facility] OR;  Service: Orthopedics;  Laterality: Left;    POSTERIOR FUSION OF CERVICAL SPINE WITH LAMINECTOMY N/A 11/14/2022    Procedure: LAMINECTOMY, SPINE, CERVICAL, WITH POSTERIOR FUSION C3-C6;  Surgeon: Nicolette Cheek MD;  [address];  Service: Neurosurgery;  Laterality: N/A;  TORONTO III, ASA III, BLOOD TYPE AND SCREEN, NEUROMONITORING EMG SEP MEP, BRACE/HALO [place], BED REGULAR BED, HEADREST [place], POSITION PRONE, SPECIAL EQUIPMENT NIKI MIDDLETON, RADIOLOGY C-ARM, EXT. BONE STIMULATOR BIOMET    REPLACEMENT OF PACEMAKER GENERATOR Left 10/7/2022    Procedure: REPLACEMENT, PACEMAKER GENERATOR;  Surgeon: John Sheets MD;  Location: [facility] EP LAB;  Service: Cardiology;  Laterality: Left;  YAS, SSS, AVB, Dual PPM Gen Change,SJM, MAC ,UT, 3 prep,** Adam dcPPM in situ**    SURGICAL REMOVAL OF NASAL TURBINATE Bilateral 12/19/2019    Procedure: EXCISION, NASAL TURBINATE;  Surgeon: Martinez Flores III, MD;  Location: [facility];  Service: ENT;  Laterality: Bilateral;    TONSILLECTOMY      WISDOM TOOTH EXTRACTION       Review of patient's allergies indicates:  No Known Allergies    Scheduled Medications:    acetaminophen  1,000 mg Oral Q8H    albuterol-ipratropium  3 mL Nebulization Q8H    buPROPion  300 mg Oral Daily    ceFAZolin (ANCEF) IVPB  2 g Intravenous Q8H    dicyclomine  20 mg Oral Q6H    FLUoxetine  40 mg Oral Daily    gabapentin  200 mg  Oral TID    heparin (porcine)  5,000 Units Subcutaneous Q8H    losartan  50 mg Oral Daily    methocarbamoL  1,000 mg Oral Q8H    metoprolol tartrate  25 mg Oral BID    mupirocin   Nasal BID    polyethylene glycol  17 g Oral Daily    pravastatin  20 mg Oral Daily    senna-docusate 8.6-50 mg  2 tablet Oral Daily    vancomycin  125 mg Oral Q6H       PRN Medications: aluminum-magnesium hydroxide-simethicone, glycerin adult, hydrALAZINE, melatonin, ondansetron, oxyCODONE, oxyCODONE, prochlorperazine    Family History    None       Tobacco Use    Smoking status: Former     Packs/day: 0.25     Years: 15.00     Pack years: 3.75     Types: Cigarettes     Quit date: 1982     Years since quittin.3    Smokeless tobacco: Former   Substance and Sexual Activity    Alcohol use: Yes     Comment: no daily or heavy use, ~4 times per month    Drug use: No    Sexual activity: Not Currently     Partners: Male     Review of Systems   Constitutional:  Positive for activity change.   Respiratory:  Negative for cough and shortness of breath.    Cardiovascular:  Negative for chest pain and leg swelling.   Gastrointestinal:  Positive for constipation. Negative for abdominal distention.   Musculoskeletal:  Positive for gait problem.   Neurological:  Positive for weakness. Negative for speech difficulty.   Psychiatric/Behavioral:  Negative for agitation, behavioral problems and confusion.    Objective:     Vital Signs (Most Recent):  Temp: 98 °F (36.7 °C) (22 0745)  Pulse: 70 (22 0841)  Resp: 16 (22 0855)  BP: 130/64 (22 0841)  SpO2: 96 % (22 0821)    Vital Signs (24h Range):  Temp:  [97.9 °F (36.6 °C)-99.3 °F (37.4 °C)] 98 °F (36.7 °C)  Pulse:  [66-84] 70  Resp:  [14-18] 16  SpO2:  [94 %-100 %] 96 %  BP: (130-190)/(60-81) 130/64     Body mass index is 24.1 kg/m².    Physical Exam  Vitals and nursing note reviewed.   Constitutional:       General: She is awake.      Appearance: Normal  appearance.   HENT:      Head: Normocephalic and atraumatic.   Eyes:      Extraocular Movements: Extraocular movements intact.   Neck:      Comments: C-collar in place.   Pulmonary:      Effort: No respiratory distress.   Abdominal:      General: There is no distension.   Musculoskeletal:         General: Normal range of motion.   Skin:     General: Skin is warm and dry.      Capillary Refill: Capillary refill takes 2 to 3 seconds.   Neurological:      General: No focal deficit present.      Mental Status: She is alert and oriented to person, place, and time.      GCS: GCS eye subscore is 4. GCS verbal subscore is 5. GCS motor subscore is 6.      Motor: Weakness present.      Coordination: Impaired rapid alternating movements.      Gait: Gait abnormal.   Psychiatric:         Attention and Perception: Attention normal.         Mood and Affect: Mood and affect normal.         Speech: Speech normal.         Behavior: Behavior normal. Behavior is cooperative.         Thought Content: Thought content normal.         Cognition and Memory: Cognition and memory normal.     NEUROLOGICAL EXAMINATION:     MENTAL STATUS   Oriented to person, place, and time.   Speech: speech is normal     Diagnostic Results: Labs: Reviewed    Assessment/Plan:     * Cervical myelopathy  -s/p C3-C6 posterior cervical fusion on 11/14.  -HV in place.  -C-Collar when OOB and upright as per NSGY.  -Pain controlled per current regimen.   -C-diff PPX with Vanc PO currently for Hx of severe C-diff infection.  -Post Op BM pending.      PM&R Recommendation:     At this time, the PM&R team has reviewed this patient's ongoing medical case including inpatient diagnosis, medical history, clinical examination, labs, vitals, current social and functional history to provide the post-acute recommendation as follows:     RECOMMENDATIONS: inpatient rehabilitation due to fair to good potential for improvement with therapies and need of physician oversight for  management of ongoing active medical issues, once medically stable. Pt is pending Post Op BM.        The patient will be admitted for comprehensive interdisciplinary inpatient rehabilitation to address the impairments due to her  medical diagnosis o fs/p C3-C6 posterior cervical fusion on 11/14. The patient will benefit from an inpatient rehabilitation program to promote functional recovery, implement compensatory strategies and will undergo assessment for needs for durable medical equipment for safe discharge to the community. This patient will benefit from a coordinated interdisciplinary rehabilitation program services that require close monitoring and treatment with 24-hour rehabilitative nursing and physical/occupational therapies for 3 hours/day for 5 days/week. This interdisciplinary program will be performed under the direction of a physiatrist.       We will  sign off with the transfer of the patient to [facility] liaison who will continue to follow going forward until admission to [facility].          Thank you for your consult.     Kaylie Sanchez NP  Department of Physical Medicine & Rehab  [facility] Neurosurgery [facility])

## 2022-11-18 NOTE — ASSESSMENT & PLAN NOTE
Manisha Wynne is a 72 y.o. female with cervical myelopathy s/p C3-C6 posterior cervical fusion on 11/14 by Dr. Cheek     - Neuro stable on exam  - Incision with prineo in place. Do not apply anything to incision.   - HV drain to gravity. 55 cc output. Keep in place. Abx while in place.  - Post op XR satisfactory  - C collar to be worn when upright and OOB  - Pain control- added oxy 10 q 4 hours prn severe pain. Increased gabapentin to 200 mg tid. Continue scheduled robaxin and tylenol.   - Elevated SBP: continue home meds. Labile SBP with elevations to 190. Increased losartan to 50 mg daily. CTM  - PT/OT  - Duonebs/CPT q 8 hours for atelectasis prophylaxis   - Continue oral vancomycin in the post operative period for C diff prophylaxis. Patient has a history of severe C diff infection.   - Miralax daily, suppository today prn  - SQH    Discussed with Dr. Cheek

## 2022-11-18 NOTE — HOSPITAL COURSE
OT- 11/16    Bed Mobility:    Patient completed Rolling/Turning to Right with minimum assistance  Patient completed Scooting/Bridging with minimum assistance  Patient completed Supine to Sit with moderate assistance  Balance: Pt sat EOB ~5 minutes with close SBA      Functional Mobility/Transfers:  Patient completed Sit <> Stand Transfer with moderate assistance  with  no assistive device and Th standing in front of pt for support   Patient completed Bed <> Chair Transfer using Stand Pivot technique with minimum assistance with no assistive device  Pt completed standing marching ~45 seconds w/ mod A for balance, working to increase standing tolerance needed for ADLs and functional mobility.     Activities of Daily Living:  Feeding:  Independent after set-up assistance    Grooming: stand by assistance to comb hair, wash face, and complete oral cares while sitting up in chair  Lower Body Dressing: maximal assistance to adjust B socks; pt unable to complete figure-4 due to pain    PT-11/17    Functional Mobility:  Bed Mobility:     Supine to Sit: stand by assistance  Transfers:     Sit to Stand:  contact guard assistance with rolling walker and verbal cueing for RW management.  Toilet Transfer: contact guard assistance with  rolling walker  using  Step Transfer and Pt needed verbal cueing for hand placement  Gait: Pt gait trained for two trails of 14 ft with RW and CGA. Pt had decreased stride length and increased dizziness

## 2022-11-18 NOTE — SUBJECTIVE & OBJECTIVE
Interval History:  NAEON. SBP spikes continue in AM, losartan increased 50 mg daily. Pain this AM, improved with medication. Neuro exam stable. No BM yet, plan for suppository this PM if unable to go. Pending IPR    Medications:  Continuous Infusions:   sodium chloride 0.9% Stopped (11/14/22 1637)     Scheduled Meds:   acetaminophen  1,000 mg Oral Q8H    albuterol-ipratropium  3 mL Nebulization Q8H    buPROPion  300 mg Oral Daily    ceFAZolin (ANCEF) IVPB  2 g Intravenous Q8H    dicyclomine  20 mg Oral Q6H    FLUoxetine  40 mg Oral Daily    gabapentin  200 mg Oral TID    heparin (porcine)  5,000 Units Subcutaneous Q8H    losartan  50 mg Oral Daily    methocarbamoL  1,000 mg Oral Q8H    metoprolol tartrate  25 mg Oral BID    mupirocin   Nasal BID    polyethylene glycol  17 g Oral Daily    pravastatin  20 mg Oral Daily    senna-docusate 8.6-50 mg  2 tablet Oral Daily    vancomycin  125 mg Oral Q6H     PRN Meds:aluminum-magnesium hydroxide-simethicone, glycerin adult, hydrALAZINE, melatonin, ondansetron, oxyCODONE, oxyCODONE, prochlorperazine     Review of Systems  Objective:     Weight: 71.9 kg (158 lb 8.2 oz)  Body mass index is 24.1 kg/m².  Vital Signs (Most Recent):  Temp: 98 °F (36.7 °C) (11/18/22 0745)  Pulse: 76 (11/18/22 0745)  Resp: 16 (11/18/22 0745)  BP: 130/64 (11/18/22 0745)  SpO2: 96 % (11/18/22 0745) Vital Signs (24h Range):  Temp:  [97.9 °F (36.6 °C)-99.3 °F (37.4 °C)] 98 °F (36.7 °C)  Pulse:  [61-84] 76  Resp:  [14-18] 16  SpO2:  [94 %-100 %] 96 %  BP: (130-190)/(60-92) 130/64                          Closed/Suction Drain 11/14/22 1742 Posterior Neck Accordion 10 Fr. (Active)   Site Description Unable to view 11/17/22 2000   Dressing Status Clean;Dry;Intact 11/17/22 2000   Dressing Intervention Integrity maintained 11/17/22 2000   Drainage Sanguineous 11/17/22 2000   Status Open to gravity drainage 11/17/22 2000   Output (mL) 30 mL 11/17/22 2000       Female External Urinary Catheter 11/15/22 0800  (Active)   Skin no redness;no breakdown 11/17/22 2000   Tolerance no signs/symptoms of discomfort 11/17/22 2000   Suction Continuous suction at 70 mmHg 11/17/22 2000   Date of last wick change 11/17/22 11/17/22 2000   Time of last wick change 1006 11/17/22 0800   Output (mL) 400 mL 11/18/22 0400       Physical Exam    Neurosurgery Physical Exam  General: well developed, well nourished, no distress.   Head: normocephalic, atraumatic  Neurologic: Alert and oriented. Thought content appropriate.  GCS: Motor: 6/Verbal: 5/Eyes: 4 GCS Total: 15  Mental Status: Awake, Alert, Oriented x 4  Language: No aphasia  Speech: No dysarthria  Cranial nerves: face symmetric, tongue midline, CN II-XII grossly intact.   Eyes: pupils equal, round, reactive to light with accommodation, EOMI.   Pulmonary: normal respirations, no signs of respiratory distress  Abdomen: soft, non-distended, not tender to palpation  Skin: Skin is warm, dry and intact.  Sensory: intact to light touch throughout     Motor Strength:Moves all extremities spontaneously with good tone.  Full strength upper and lower extremities. No abnormal movements seen.        Reflexes:   Maria's: positive bilaterally     Incision: Clean, dry, prineo  intact. Skin edges well approximated. No surrounding erythema or edema. No drainage or TTP.      HV to gravity     Significant Labs:  No results for input(s): GLU, NA, K, CL, CO2, BUN, CREATININE, CALCIUM, MG in the last 48 hours.  Recent Labs   Lab 11/17/22  0828   WBC 10.21   HGB 11.4*   HCT 34.7*        No results for input(s): LABPT, INR, APTT in the last 48 hours.  Microbiology Results (last 7 days)       ** No results found for the last 168 hours. **          All pertinent labs from the last 24 hours have been reviewed.    Significant Diagnostics:  No results found in the last 24 hours.

## 2022-11-18 NOTE — CONSULTS
Inpatient consult to Physical Medicine Rehab  Consult performed by: Kaylie Sanchez NP  Consult ordered by: Nicolette Cheek MD    Consult received.  Reviewed patient history and current admission.  PM&R following.    Kaylie Sanchez NP  Physical Medicine & Rehabilitation   11/18/2022

## 2022-11-18 NOTE — PLAN OF CARE
Patient auth rec'd for [facility].  Rec had changed yesterday and referral had been sent to [facility].  MD wants patient to got to Rehab.  PMR rec is Rehab.  Discussion held with PA that we will not likely get the Rehab auth and patient will remain in the hospital.  Patient wants to try for Rehab.

## 2022-11-18 NOTE — HPI
Per chart review, Manisha Wynne is a 72 y.o. female with cervical stenosis and myelopathy. She has an MRI-non-compatible pacemaker in place. This will be due for replacement within the next few months. Pt is s/p C3-C6 posterior cervical fusion on 11/14.         Functional History: Patient lives alone  in a duplex  with 10 steps  to enter.  Prior to admission, pt was Mod I with cane use for mobility.  DME: Cane.

## 2022-11-18 NOTE — PROGRESS NOTES
[facility] - Neurosurgery ([facility])  Neurosurgery  Progress Note    Subjective:     History of Present Illness: Manisha Wynne is a 72 y.o. female with cervical stenosis and myelopathy. She has an MRI-non-compatible pacemaker in place. This will be due for replacement within the next few months.      Given the results of the myelogram, I expect that she would benefit from C3-C6 laminectomy and fusion, with undercutting of the C2 lamina. We had a lengthy and pleasant conversation about this procedure, including the associated hospitalization.       Post-Op Info:  Procedure(s) (LRB):  LAMINECTOMY, SPINE, CERVICAL, WITH POSTERIOR FUSION C3-C6 (N/A)   4 Days Post-Op     Interval History:  NAEON. SBP spikes continue in AM, losartan increased 50 mg daily. Pain this AM, improved with medication. Neuro exam stable. No BM yet, plan for suppository this PM if unable to go. Pending IPR    Medications:  Continuous Infusions:   sodium chloride 0.9% Stopped (11/14/22 1637)     Scheduled Meds:   acetaminophen  1,000 mg Oral Q8H    albuterol-ipratropium  3 mL Nebulization Q8H    buPROPion  300 mg Oral Daily    ceFAZolin (ANCEF) IVPB  2 g Intravenous Q8H    dicyclomine  20 mg Oral Q6H    FLUoxetine  40 mg Oral Daily    gabapentin  200 mg Oral TID    heparin (porcine)  5,000 Units Subcutaneous Q8H    losartan  50 mg Oral Daily    methocarbamoL  1,000 mg Oral Q8H    metoprolol tartrate  25 mg Oral BID    mupirocin   Nasal BID    polyethylene glycol  17 g Oral Daily    pravastatin  20 mg Oral Daily    senna-docusate 8.6-50 mg  2 tablet Oral Daily    vancomycin  125 mg Oral Q6H     PRN Meds:aluminum-magnesium hydroxide-simethicone, glycerin adult, hydrALAZINE, melatonin, ondansetron, oxyCODONE, oxyCODONE, prochlorperazine     Review of Systems  Objective:     Weight: 71.9 kg (158 lb 8.2 oz)  Body mass index is 24.1 kg/m².  Vital Signs (Most Recent):  Temp: 98 °F (36.7 °C) (11/18/22 0745)  Pulse: 76 (11/18/22  0745)  Resp: 16 (11/18/22 0745)  BP: 130/64 (11/18/22 0745)  SpO2: 96 % (11/18/22 0745) Vital Signs (24h Range):  Temp:  [97.9 °F (36.6 °C)-99.3 °F (37.4 °C)] 98 °F (36.7 °C)  Pulse:  [61-84] 76  Resp:  [14-18] 16  SpO2:  [94 %-100 %] 96 %  BP: (130-190)/(60-92) 130/64                          Closed/Suction Drain 11/14/22 1742 Posterior Neck Accordion 10 Fr. (Active)   Site Description Unable to view 11/17/22 2000   Dressing Status Clean;Dry;Intact 11/17/22 2000   Dressing Intervention Integrity maintained 11/17/22 2000   Drainage Sanguineous 11/17/22 2000   Status Open to gravity drainage 11/17/22 2000   Output (mL) 30 mL 11/17/22 2000       Female External Urinary Catheter 11/15/22 0800 (Active)   Skin no redness;no breakdown 11/17/22 2000   Tolerance no signs/symptoms of discomfort 11/17/22 2000   Suction Continuous suction at 70 mmHg 11/17/22 2000   Date of last wick change 11/17/22 11/17/22 2000   Time of last wick change 1006 11/17/22 0800   Output (mL) 400 mL 11/18/22 0400       Physical Exam    Neurosurgery Physical Exam  General: well developed, well nourished, no distress.   Head: normocephalic, atraumatic  Neurologic: Alert and oriented. Thought content appropriate.  GCS: Motor: 6/Verbal: 5/Eyes: 4 GCS Total: 15  Mental Status: Awake, Alert, Oriented x 4  Language: No aphasia  Speech: No dysarthria  Cranial nerves: face symmetric, tongue midline, CN II-XII grossly intact.   Eyes: pupils equal, round, reactive to light with accommodation, EOMI.   Pulmonary: normal respirations, no signs of respiratory distress  Abdomen: soft, non-distended, not tender to palpation  Skin: Skin is warm, dry and intact.  Sensory: intact to light touch throughout     Motor Strength:Moves all extremities spontaneously with good tone.  Full strength upper and lower extremities. No abnormal movements seen.        Reflexes:   Maria's: positive bilaterally     Incision: Clean, dry, prineo  intact. Skin edges well approximated.  No surrounding erythema or edema. No drainage or TTP.      HV to gravity     Significant Labs:  No results for input(s): GLU, NA, K, CL, CO2, BUN, CREATININE, CALCIUM, MG in the last 48 hours.  Recent Labs   Lab 11/17/22  0828   WBC 10.21   HGB 11.4*   HCT 34.7*        No results for input(s): LABPT, INR, APTT in the last 48 hours.  Microbiology Results (last 7 days)       ** No results found for the last 168 hours. **          All pertinent labs from the last 24 hours have been reviewed.    Significant Diagnostics:  No results found in the last 24 hours.      Assessment/Plan:     * Cervical myelopathy  Manisha Wynne is a 72 y.o. female with cervical myelopathy s/p C3-C6 posterior cervical fusion on 11/14 by Dr. Cheek     - Neuro stable on exam  - Incision with prineo in place. Do not apply anything to incision.   - HV drain to gravity. 55 cc output. Keep in place. Abx while in place.  - Post op XR satisfactory  - C collar to be worn when upright and OOB  - Pain control- added oxy 10 q 4 hours prn severe pain. Increased gabapentin to 200 mg tid. Continue scheduled robaxin and tylenol.   - Elevated SBP: continue home meds. Labile SBP with elevations to 190. Increased losartan to 50 mg daily. CTM  - PT/OT  - Duonebs/CPT q 8 hours for atelectasis prophylaxis   - Continue oral vancomycin in the post operative period for C diff prophylaxis. Patient has a history of severe C diff infection.   - Miralax daily, suppository today prn  - SQH    Discussed with KALEB Vizcaino-C  Neurosurgery  [facility] Villalobos - Neurosurgery ([facility])

## 2022-11-19 LAB
ANION GAP SERPL CALC-SCNC: 7 MMOL/L (ref 8–16)
BASOPHILS # BLD AUTO: 0.03 K/UL (ref 0–0.2)
BASOPHILS NFR BLD: 0.3 % (ref 0–1.9)
BUN SERPL-MCNC: 14 MG/DL (ref 8–23)
CALCIUM SERPL-MCNC: 8.6 MG/DL (ref 8.7–10.5)
CHLORIDE SERPL-SCNC: 106 MMOL/L (ref 95–110)
CO2 SERPL-SCNC: 21 MMOL/L (ref 23–29)
CREAT SERPL-MCNC: 0.8 MG/DL (ref 0.5–1.4)
DIFFERENTIAL METHOD: ABNORMAL
EOSINOPHIL # BLD AUTO: 0.3 K/UL (ref 0–0.5)
EOSINOPHIL NFR BLD: 2.5 % (ref 0–8)
ERYTHROCYTE [DISTWIDTH] IN BLOOD BY AUTOMATED COUNT: 14.5 % (ref 11.5–14.5)
EST. GFR  (NO RACE VARIABLE): >60 ML/MIN/1.73 M^2
GLUCOSE SERPL-MCNC: 116 MG/DL (ref 70–110)
HCT VFR BLD AUTO: 31.6 % (ref 37–48.5)
HGB BLD-MCNC: 10.4 G/DL (ref 12–16)
IMM GRANULOCYTES # BLD AUTO: 0.06 K/UL (ref 0–0.04)
IMM GRANULOCYTES NFR BLD AUTO: 0.5 % (ref 0–0.5)
LYMPHOCYTES # BLD AUTO: 1.3 K/UL (ref 1–4.8)
LYMPHOCYTES NFR BLD: 10.8 % (ref 18–48)
MCH RBC QN AUTO: 30.9 PG (ref 27–31)
MCHC RBC AUTO-ENTMCNC: 32.9 G/DL (ref 32–36)
MCV RBC AUTO: 94 FL (ref 82–98)
MONOCYTES # BLD AUTO: 1.4 K/UL (ref 0.3–1)
MONOCYTES NFR BLD: 11.7 % (ref 4–15)
NEUTROPHILS # BLD AUTO: 8.6 K/UL (ref 1.8–7.7)
NEUTROPHILS NFR BLD: 74.2 % (ref 38–73)
NRBC BLD-RTO: 0 /100 WBC
PLATELET # BLD AUTO: 176 K/UL (ref 150–450)
PMV BLD AUTO: 10.1 FL (ref 9.2–12.9)
POTASSIUM SERPL-SCNC: 4.5 MMOL/L (ref 3.5–5.1)
RBC # BLD AUTO: 3.37 M/UL (ref 4–5.4)
SODIUM SERPL-SCNC: 134 MMOL/L (ref 136–145)
WBC # BLD AUTO: 11.62 K/UL (ref 3.9–12.7)

## 2022-11-19 PROCEDURE — 94761 N-INVAS EAR/PLS OXIMETRY MLT: CPT

## 2022-11-19 PROCEDURE — 94668 MNPJ CHEST WALL SBSQ: CPT

## 2022-11-19 PROCEDURE — 99024 POSTOP FOLLOW-UP VISIT: CPT | Mod: ,,, | Performed by: PHYSICIAN ASSISTANT

## 2022-11-19 PROCEDURE — 94664 DEMO&/EVAL PT USE INHALER: CPT

## 2022-11-19 PROCEDURE — 25000003 PHARM REV CODE 250: Performed by: STUDENT IN AN ORGANIZED HEALTH CARE EDUCATION/TRAINING PROGRAM

## 2022-11-19 PROCEDURE — 85025 COMPLETE CBC W/AUTO DIFF WBC: CPT | Performed by: PHYSICIAN ASSISTANT

## 2022-11-19 PROCEDURE — 27000646 HC AEROBIKA DEVICE

## 2022-11-19 PROCEDURE — 94640 AIRWAY INHALATION TREATMENT: CPT

## 2022-11-19 PROCEDURE — 63600175 PHARM REV CODE 636 W HCPCS: Performed by: STUDENT IN AN ORGANIZED HEALTH CARE EDUCATION/TRAINING PROGRAM

## 2022-11-19 PROCEDURE — 36415 COLL VENOUS BLD VENIPUNCTURE: CPT | Performed by: PHYSICIAN ASSISTANT

## 2022-11-19 PROCEDURE — 99900035 HC TECH TIME PER 15 MIN (STAT)

## 2022-11-19 PROCEDURE — 99024 PR POST-OP FOLLOW-UP VISIT: ICD-10-PCS | Mod: ,,, | Performed by: PHYSICIAN ASSISTANT

## 2022-11-19 PROCEDURE — 11000001 HC ACUTE MED/SURG PRIVATE ROOM

## 2022-11-19 PROCEDURE — 25000242 PHARM REV CODE 250 ALT 637 W/ HCPCS: Performed by: PHYSICIAN ASSISTANT

## 2022-11-19 PROCEDURE — 25000003 PHARM REV CODE 250: Performed by: PHYSICIAN ASSISTANT

## 2022-11-19 PROCEDURE — 80048 BASIC METABOLIC PNL TOTAL CA: CPT | Performed by: PHYSICIAN ASSISTANT

## 2022-11-19 RX ORDER — LISDEXAMFETAMINE DIMESYLATE 70 MG/1
70 CAPSULE ORAL EVERY MORNING
Status: DISCONTINUED | OUTPATIENT
Start: 2022-11-19 | End: 2022-11-23 | Stop reason: HOSPADM

## 2022-11-19 RX ADMIN — Medication 2 G: at 03:11

## 2022-11-19 RX ADMIN — PRAVASTATIN SODIUM 20 MG: 20 TABLET ORAL at 08:11

## 2022-11-19 RX ADMIN — HEPARIN SODIUM 5000 UNITS: 5000 INJECTION INTRAVENOUS; SUBCUTANEOUS at 05:11

## 2022-11-19 RX ADMIN — Medication 2 G: at 06:11

## 2022-11-19 RX ADMIN — VANCOMYCIN HYDROCHLORIDE 125 MG: KIT at 11:11

## 2022-11-19 RX ADMIN — METHOCARBAMOL 1000 MG: 500 TABLET ORAL at 09:11

## 2022-11-19 RX ADMIN — ACETAMINOPHEN 1000 MG: 325 TABLET ORAL at 01:11

## 2022-11-19 RX ADMIN — METHOCARBAMOL 1000 MG: 500 TABLET ORAL at 01:11

## 2022-11-19 RX ADMIN — GABAPENTIN 200 MG: 100 CAPSULE ORAL at 03:11

## 2022-11-19 RX ADMIN — DICYCLOMINE HYDROCHLORIDE 20 MG: 20 TABLET ORAL at 11:11

## 2022-11-19 RX ADMIN — OXYCODONE HYDROCHLORIDE 10 MG: 10 TABLET ORAL at 08:11

## 2022-11-19 RX ADMIN — HEPARIN SODIUM 5000 UNITS: 5000 INJECTION INTRAVENOUS; SUBCUTANEOUS at 09:11

## 2022-11-19 RX ADMIN — METOPROLOL TARTRATE 25 MG: 25 TABLET, FILM COATED ORAL at 08:11

## 2022-11-19 RX ADMIN — GABAPENTIN 200 MG: 100 CAPSULE ORAL at 08:11

## 2022-11-19 RX ADMIN — VANCOMYCIN HYDROCHLORIDE 125 MG: KIT at 04:11

## 2022-11-19 RX ADMIN — ACETAMINOPHEN 1000 MG: 325 TABLET ORAL at 05:11

## 2022-11-19 RX ADMIN — METHOCARBAMOL 1000 MG: 500 TABLET ORAL at 05:11

## 2022-11-19 RX ADMIN — IPRATROPIUM BROMIDE AND ALBUTEROL SULFATE 3 ML: 2.5; .5 SOLUTION RESPIRATORY (INHALATION) at 03:11

## 2022-11-19 RX ADMIN — Medication 70 MG: at 11:11

## 2022-11-19 RX ADMIN — Medication 2 G: at 11:11

## 2022-11-19 RX ADMIN — IPRATROPIUM BROMIDE AND ALBUTEROL SULFATE 3 ML: 2.5; .5 SOLUTION RESPIRATORY (INHALATION) at 12:11

## 2022-11-19 RX ADMIN — HEPARIN SODIUM 5000 UNITS: 5000 INJECTION INTRAVENOUS; SUBCUTANEOUS at 01:11

## 2022-11-19 RX ADMIN — BUPROPION HYDROCHLORIDE 300 MG: 300 TABLET, FILM COATED, EXTENDED RELEASE ORAL at 08:11

## 2022-11-19 RX ADMIN — MUPIROCIN: 20 OINTMENT TOPICAL at 08:11

## 2022-11-19 RX ADMIN — VANCOMYCIN HYDROCHLORIDE 125 MG: KIT at 05:11

## 2022-11-19 RX ADMIN — IPRATROPIUM BROMIDE AND ALBUTEROL SULFATE 3 ML: 2.5; .5 SOLUTION RESPIRATORY (INHALATION) at 07:11

## 2022-11-19 RX ADMIN — LOSARTAN POTASSIUM 50 MG: 50 TABLET, FILM COATED ORAL at 08:11

## 2022-11-19 RX ADMIN — FLUOXETINE 40 MG: 20 CAPSULE ORAL at 08:11

## 2022-11-19 RX ADMIN — SENNOSIDES AND DOCUSATE SODIUM 2 TABLET: 50; 8.6 TABLET ORAL at 08:11

## 2022-11-19 NOTE — ASSESSMENT & PLAN NOTE
Manisha Wynne is a 72 y.o. female with cervical myelopathy s/p C3-C6 posterior cervical fusion on 11/14 by Dr. Cheek     - Neuro stable on exam  - Incision with prineo in place. Do not apply anything to incision.   - HV drain to gravity. 100 cc output. Keep in place. Abx while in place.  - Post op XR satisfactory  - C collar to be worn when upright and OOB  - Pain control- added oxy 10 q 4 hours prn severe pain. Increased gabapentin to 200 mg tid. Continue scheduled robaxin and tylenol.   - Elevated SBP: continue home meds. Labile SBP with elevations to 190. Increased losartan to 50 mg daily. CTM  - Duonebs/CPT q 8 hours for atelectasis prophylaxis   - Continue oral vancomycin in the post operative period for C diff prophylaxis. Patient has a history of severe C diff infection.  - DVT prophylaxis: LACHELLE's, SCD's, SQH  - Bowel regimen: senna BID and miralax daily. Last BM 11/18  - Atelectasis prevention: IS hourly while awake, PT/OT, OOB > 6 hours per day     Dispo: IPR likely monday

## 2022-11-19 NOTE — SUBJECTIVE & OBJECTIVE
Interval History: NAEON. Patient neuro stable. No complaints this morning. HV drain to gravity, 100 cc SS output, will keep today. CBC and BMP pending. Patient had friend bring home vyvanseruslan to resume. Had BM yesterday. Pending IPR, likely Monday.     Medications:  Continuous Infusions:  Scheduled Meds:   acetaminophen  1,000 mg Oral Q8H    albuterol-ipratropium  3 mL Nebulization Q8H    buPROPion  300 mg Oral Daily    ceFAZolin (ANCEF) IVPB  2 g Intravenous Q8H    dicyclomine  20 mg Oral Q6H    FLUoxetine  40 mg Oral Daily    gabapentin  200 mg Oral TID    heparin (porcine)  5,000 Units Subcutaneous Q8H    lisdexamfetamine  70 mg Oral QAM    losartan  50 mg Oral Daily    methocarbamoL  1,000 mg Oral Q8H    metoprolol tartrate  25 mg Oral BID    polyethylene glycol  17 g Oral Daily    pravastatin  20 mg Oral Daily    senna-docusate 8.6-50 mg  2 tablet Oral Daily    vancomycin  125 mg Oral Q6H     PRN Meds:aluminum-magnesium hydroxide-simethicone, glycerin adult, hydrALAZINE, melatonin, ondansetron, oxyCODONE, oxyCODONE, prochlorperazine     Review of Systems  Objective:     Weight: 71.9 kg (158 lb 8.2 oz)  Body mass index is 24.1 kg/m².  Vital Signs (Most Recent):  Temp: 97.8 °F (36.6 °C) (11/19/22 0812)  Pulse: 77 (11/19/22 0813)  Resp: 15 (11/19/22 0813)  BP: 138/63 (11/19/22 0815)  SpO2: 95 % (11/19/22 0812) Vital Signs (24h Range):  Temp:  [97.8 °F (36.6 °C)-99.6 °F (37.6 °C)] 97.8 °F (36.6 °C)  Pulse:  [67-82] 77  Resp:  [15-20] 15  SpO2:  [94 %-100 %] 95 %  BP: (121-138)/(58-63) 138/63     Date 11/19/22 0700 - 11/20/22 0659   Shift 6149-8771 8279-1523 0660-0578 24 Hour Total   INTAKE   Shift Total(mL/kg)       OUTPUT   Urine(mL/kg/hr) 900   900   Shift Total(mL/kg) 900(12.5)   900(12.5)   Weight (kg) 71.9 71.9 71.9 71.9                        Closed/Suction Drain 11/14/22 1742 Posterior Neck Accordion 10 Fr. (Active)   Site Description Unable to view 11/19/22 0747   Dressing Status Clean;Dry;Intact  11/19/22 0747   Dressing Intervention Integrity maintained 11/19/22 0747   Drainage Sanguineous 11/19/22 0747   Status Open to gravity drainage 11/19/22 0747   Output (mL) 40 mL 11/19/22 0400       Female External Urinary Catheter 11/15/22 0800 (Active)   Skin no redness;no breakdown 11/19/22 0747   Tolerance no signs/symptoms of discomfort 11/19/22 0747   Suction Continuous suction at 60 mmHg 11/19/22 0747   Date of last wick change 11/19/22 11/19/22 0747   Time of last wick change 0754 11/19/22 0747   Output (mL) 500 mL 11/19/22 0400       Neurosurgery Physical Exam  General: well developed, well nourished, no distress.   Head: normocephalic, atraumatic  Neurologic: Alert and oriented. Thought content appropriate.  Language: No aphasia  Speech: No dysarthria  Cranial nerves: face symmetric, tongue midline, CN II-XII grossly intact.   Eyes: pupils equal, round, reactive to light with accommodation, EOMI.   Pulmonary: normal respirations, no signs of respiratory distress  Skin: Skin is warm, dry and intact.  Sensory: intact to light touch throughout  Motor Strength:Moves all extremities spontaneously with good tone.  Full strength upper and lower extremities. No abnormal movements seen.   Incision: Clean, dry, prineo  intact. Skin edges well approximated. No surrounding erythema or edema. No drainage or TTP.  HV drain x 1 to gravity with SS output    Significant Labs:  Recent Labs   Lab 11/19/22  0925   *   *   K 4.5      CO2 21*   BUN 14   CREATININE 0.8   CALCIUM 8.6*     Recent Labs   Lab 11/19/22  0925   WBC 11.62   HGB 10.4*   HCT 31.6*        No results for input(s): LABPT, INR, APTT in the last 48 hours.  Microbiology Results (last 7 days)       ** No results found for the last 168 hours. **          All pertinent labs from the last 24 hours have been reviewed.    Significant Diagnostics:  I have reviewed all pertinent imaging results/findings within the past 24 hours.

## 2022-11-19 NOTE — PROGRESS NOTES
[facility] Villalobos - Neurosurgery ([facility])  Neurosurgery  Progress Note    Subjective:     History of Present Illness: Manisha Wynne is a 72 y.o. female with cervical stenosis and myelopathy. She has an MRI-non-compatible pacemaker in place. This will be due for replacement within the next few months.      Given the results of the myelogram, I expect that she would benefit from C3-C6 laminectomy and fusion, with undercutting of the C2 lamina. We had a lengthy and pleasant conversation about this procedure, including the associated hospitalization.       Post-Op Info:  Procedure(s) (LRB):  LAMINECTOMY, SPINE, CERVICAL, WITH POSTERIOR FUSION C3-C6 (N/A)   5 Days Post-Op     Interval History: NAEON. Patient neuro stable. No complaints this morning. HV drain to gravity, 100 cc SS output, will keep today. CBC and BMP pending. Patient had friend bring home vyvanse, okay to resume. Had BM yesterday. Pending IPR, likely Monday.     Medications:  Continuous Infusions:  Scheduled Meds:   acetaminophen  1,000 mg Oral Q8H    albuterol-ipratropium  3 mL Nebulization Q8H    buPROPion  300 mg Oral Daily    ceFAZolin (ANCEF) IVPB  2 g Intravenous Q8H    dicyclomine  20 mg Oral Q6H    FLUoxetine  40 mg Oral Daily    gabapentin  200 mg Oral TID    heparin (porcine)  5,000 Units Subcutaneous Q8H    lisdexamfetamine  70 mg Oral QAM    losartan  50 mg Oral Daily    methocarbamoL  1,000 mg Oral Q8H    metoprolol tartrate  25 mg Oral BID    polyethylene glycol  17 g Oral Daily    pravastatin  20 mg Oral Daily    senna-docusate 8.6-50 mg  2 tablet Oral Daily    vancomycin  125 mg Oral Q6H     PRN Meds:aluminum-magnesium hydroxide-simethicone, glycerin adult, hydrALAZINE, melatonin, ondansetron, oxyCODONE, oxyCODONE, prochlorperazine     Review of Systems  Objective:     Weight: 71.9 kg (158 lb 8.2 oz)  Body mass index is 24.1 kg/m².  Vital Signs (Most Recent):  Temp: 97.8 °F (36.6 °C) (11/19/22 0812)  Pulse: 77 (11/19/22  0813)  Resp: 15 (11/19/22 0813)  BP: 138/63 (11/19/22 0815)  SpO2: 95 % (11/19/22 0812) Vital Signs (24h Range):  Temp:  [97.8 °F (36.6 °C)-99.6 °F (37.6 °C)] 97.8 °F (36.6 °C)  Pulse:  [67-82] 77  Resp:  [15-20] 15  SpO2:  [94 %-100 %] 95 %  BP: (121-138)/(58-63) 138/63     Date 11/19/22 0700 - 11/20/22 0659   Shift 7341-3558 9865-5418 3606-8294 24 Hour Total   INTAKE   Shift Total(mL/kg)       OUTPUT   Urine(mL/kg/hr) 900   900   Shift Total(mL/kg) 900(12.5)   900(12.5)   Weight (kg) 71.9 71.9 71.9 71.9                        Closed/Suction Drain 11/14/22 1742 Posterior Neck Accordion 10 Fr. (Active)   Site Description Unable to view 11/19/22 0747   Dressing Status Clean;Dry;Intact 11/19/22 0747   Dressing Intervention Integrity maintained 11/19/22 0747   Drainage Sanguineous 11/19/22 0747   Status Open to gravity drainage 11/19/22 0747   Output (mL) 40 mL 11/19/22 0400       Female External Urinary Catheter 11/15/22 0800 (Active)   Skin no redness;no breakdown 11/19/22 0747   Tolerance no signs/symptoms of discomfort 11/19/22 0747   Suction Continuous suction at 60 mmHg 11/19/22 0747   Date of last wick change 11/19/22 11/19/22 0747   Time of last wick change 0754 11/19/22 0747   Output (mL) 500 mL 11/19/22 0400       Neurosurgery Physical Exam  General: well developed, well nourished, no distress.   Head: normocephalic, atraumatic  Neurologic: Alert and oriented. Thought content appropriate.  Language: No aphasia  Speech: No dysarthria  Cranial nerves: face symmetric, tongue midline, CN II-XII grossly intact.   Eyes: pupils equal, round, reactive to light with accommodation, EOMI.   Pulmonary: normal respirations, no signs of respiratory distress  Skin: Skin is warm, dry and intact.  Sensory: intact to light touch throughout  Motor Strength:Moves all extremities spontaneously with good tone.  Full strength upper and lower extremities. No abnormal movements seen.   Incision: Clean, dry, prineo  intact. Skin edges  well approximated. No surrounding erythema or edema. No drainage or TTP.  HV drain x 1 to gravity with SS output    Significant Labs:  Recent Labs   Lab 11/19/22  0925   *   *   K 4.5      CO2 21*   BUN 14   CREATININE 0.8   CALCIUM 8.6*     Recent Labs   Lab 11/19/22  0925   WBC 11.62   HGB 10.4*   HCT 31.6*        No results for input(s): LABPT, INR, APTT in the last 48 hours.  Microbiology Results (last 7 days)       ** No results found for the last 168 hours. **          All pertinent labs from the last 24 hours have been reviewed.    Significant Diagnostics:  I have reviewed all pertinent imaging results/findings within the past 24 hours.    Assessment/Plan:     * Cervical myelopathy  Manisha Wynne is a 72 y.o. female with cervical myelopathy s/p C3-C6 posterior cervical fusion on 11/14 by Dr. Cheek     - Neuro stable on exam  - Incision with prineo in place. Do not apply anything to incision.   - HV drain to gravity. 100 cc output. Keep in place. Abx while in place.  - Post op XR satisfactory  - C collar to be worn when upright and OOB  - Pain control- added oxy 10 q 4 hours prn severe pain. Increased gabapentin to 200 mg tid. Continue scheduled robaxin and tylenol.   - Elevated SBP: continue home meds. Labile SBP with elevations to 190. Increased losartan to 50 mg daily. CTM  - Duonebs/CPT q 8 hours for atelectasis prophylaxis   - Continue oral vancomycin in the post operative period for C diff prophylaxis. Patient has a history of severe C diff infection.  - DVT prophylaxis: LACHELLE's, SCD's, SQH  - Bowel regimen: senna BID and miralax daily. Last BM 11/18  - Atelectasis prevention: IS hourly while awake, PT/OT, OOB > 6 hours per day     Dispo: IPR likely monday          Denise Morales PA-C  Neurosurgery  [facility] - Neurosurgery ([facility])

## 2022-11-19 NOTE — PLAN OF CARE
Problem: Adult Inpatient Plan of Care  Goal: Plan of Care Review  Outcome: Ongoing, Progressing     Problem: Adult Inpatient Plan of Care  Goal: Patient-Specific Goal (Individualized)  Outcome: Ongoing, Progressing     Problem: Adult Inpatient Plan of Care  Goal: Optimal Comfort and Wellbeing  Outcome: Ongoing, Progressing     Problem: Adult Inpatient Plan of Care  Goal: Readiness for Transition of Care  Outcome: Ongoing, Progressing     Problem: Skin Injury Risk Increased  Goal: Skin Health and Integrity  Outcome: Ongoing, Progressing   Patient awake off and on this shift, stated she took a nap during the day. Was up on previous shift ambulating with walker and CGA. Re educated on the importance of using her Aspen Collar at all times plus monitor skin around collar for breakdown, voiced her understanding. VSS, flow sheets completed see for assessments. Bed in low position with SR's up times 3 when in bed, call light in reach for safety purposes.

## 2022-11-20 PROCEDURE — 11000001 HC ACUTE MED/SURG PRIVATE ROOM

## 2022-11-20 PROCEDURE — 27000646 HC AEROBIKA DEVICE

## 2022-11-20 PROCEDURE — 94668 MNPJ CHEST WALL SBSQ: CPT

## 2022-11-20 PROCEDURE — 25000242 PHARM REV CODE 250 ALT 637 W/ HCPCS: Performed by: PHYSICIAN ASSISTANT

## 2022-11-20 PROCEDURE — 25000003 PHARM REV CODE 250: Performed by: STUDENT IN AN ORGANIZED HEALTH CARE EDUCATION/TRAINING PROGRAM

## 2022-11-20 PROCEDURE — 63600175 PHARM REV CODE 636 W HCPCS: Performed by: STUDENT IN AN ORGANIZED HEALTH CARE EDUCATION/TRAINING PROGRAM

## 2022-11-20 PROCEDURE — 97110 THERAPEUTIC EXERCISES: CPT | Mod: CQ

## 2022-11-20 PROCEDURE — 25000003 PHARM REV CODE 250: Performed by: PHYSICIAN ASSISTANT

## 2022-11-20 PROCEDURE — 97116 GAIT TRAINING THERAPY: CPT | Mod: CQ

## 2022-11-20 PROCEDURE — 99900035 HC TECH TIME PER 15 MIN (STAT)

## 2022-11-20 PROCEDURE — 94640 AIRWAY INHALATION TREATMENT: CPT

## 2022-11-20 PROCEDURE — 94761 N-INVAS EAR/PLS OXIMETRY MLT: CPT

## 2022-11-20 PROCEDURE — 94664 DEMO&/EVAL PT USE INHALER: CPT

## 2022-11-20 RX ADMIN — METHOCARBAMOL 1000 MG: 500 TABLET ORAL at 09:11

## 2022-11-20 RX ADMIN — METOPROLOL TARTRATE 25 MG: 25 TABLET, FILM COATED ORAL at 08:11

## 2022-11-20 RX ADMIN — GABAPENTIN 200 MG: 100 CAPSULE ORAL at 02:11

## 2022-11-20 RX ADMIN — LOSARTAN POTASSIUM 50 MG: 50 TABLET, FILM COATED ORAL at 08:11

## 2022-11-20 RX ADMIN — METOPROLOL TARTRATE 25 MG: 25 TABLET, FILM COATED ORAL at 09:11

## 2022-11-20 RX ADMIN — ACETAMINOPHEN 1000 MG: 325 TABLET ORAL at 05:11

## 2022-11-20 RX ADMIN — GABAPENTIN 200 MG: 100 CAPSULE ORAL at 08:11

## 2022-11-20 RX ADMIN — METHOCARBAMOL 1000 MG: 500 TABLET ORAL at 05:11

## 2022-11-20 RX ADMIN — OXYCODONE HYDROCHLORIDE 10 MG: 10 TABLET ORAL at 09:11

## 2022-11-20 RX ADMIN — VANCOMYCIN HYDROCHLORIDE 125 MG: KIT at 01:11

## 2022-11-20 RX ADMIN — ACETAMINOPHEN 1000 MG: 325 TABLET ORAL at 02:11

## 2022-11-20 RX ADMIN — OXYCODONE HYDROCHLORIDE 10 MG: 10 TABLET ORAL at 11:11

## 2022-11-20 RX ADMIN — IPRATROPIUM BROMIDE AND ALBUTEROL SULFATE 3 ML: 2.5; .5 SOLUTION RESPIRATORY (INHALATION) at 04:11

## 2022-11-20 RX ADMIN — Medication 2 G: at 02:11

## 2022-11-20 RX ADMIN — IPRATROPIUM BROMIDE AND ALBUTEROL SULFATE 3 ML: 2.5; .5 SOLUTION RESPIRATORY (INHALATION) at 07:11

## 2022-11-20 RX ADMIN — BUPROPION HYDROCHLORIDE 300 MG: 300 TABLET, FILM COATED, EXTENDED RELEASE ORAL at 08:11

## 2022-11-20 RX ADMIN — FLUOXETINE 40 MG: 20 CAPSULE ORAL at 08:11

## 2022-11-20 RX ADMIN — GABAPENTIN 200 MG: 100 CAPSULE ORAL at 09:11

## 2022-11-20 RX ADMIN — PRAVASTATIN SODIUM 20 MG: 20 TABLET ORAL at 09:11

## 2022-11-20 RX ADMIN — Medication 2 G: at 08:11

## 2022-11-20 RX ADMIN — IPRATROPIUM BROMIDE AND ALBUTEROL SULFATE 3 ML: 2.5; .5 SOLUTION RESPIRATORY (INHALATION) at 12:11

## 2022-11-20 RX ADMIN — Medication 70 MG: at 06:11

## 2022-11-20 RX ADMIN — Medication 2 G: at 10:11

## 2022-11-20 RX ADMIN — HEPARIN SODIUM 5000 UNITS: 5000 INJECTION INTRAVENOUS; SUBCUTANEOUS at 02:11

## 2022-11-20 RX ADMIN — ACETAMINOPHEN 1000 MG: 325 TABLET ORAL at 09:11

## 2022-11-20 RX ADMIN — HEPARIN SODIUM 5000 UNITS: 5000 INJECTION INTRAVENOUS; SUBCUTANEOUS at 09:11

## 2022-11-20 RX ADMIN — METHOCARBAMOL 1000 MG: 500 TABLET ORAL at 02:11

## 2022-11-20 RX ADMIN — HEPARIN SODIUM 5000 UNITS: 5000 INJECTION INTRAVENOUS; SUBCUTANEOUS at 05:11

## 2022-11-20 NOTE — SUBJECTIVE & OBJECTIVE
Interval History: 11/20: NAEON. AFVSS. Exam stable. HV in place; d/c today. Pending IPR, likely tomorrow.     Medications:  Continuous Infusions:  Scheduled Meds:   acetaminophen  1,000 mg Oral Q8H    albuterol-ipratropium  3 mL Nebulization Q8H    buPROPion  300 mg Oral Daily    ceFAZolin (ANCEF) IVPB  2 g Intravenous Q8H    dicyclomine  20 mg Oral Q6H    FLUoxetine  40 mg Oral Daily    gabapentin  200 mg Oral TID    heparin (porcine)  5,000 Units Subcutaneous Q8H    lisdexamfetamine  70 mg Oral QAM    losartan  50 mg Oral Daily    methocarbamoL  1,000 mg Oral Q8H    metoprolol tartrate  25 mg Oral BID    polyethylene glycol  17 g Oral Daily    pravastatin  20 mg Oral Daily    senna-docusate 8.6-50 mg  2 tablet Oral Daily     PRN Meds:aluminum-magnesium hydroxide-simethicone, glycerin adult, hydrALAZINE, melatonin, ondansetron, oxyCODONE, oxyCODONE, prochlorperazine     Review of Systems  Objective:     Weight: 71.9 kg (158 lb 8.2 oz)  Body mass index is 24.1 kg/m².  Vital Signs (Most Recent):  Temp: 97.8 °F (36.6 °C) (11/20/22 0830)  Pulse: 71 (11/20/22 0833)  Resp: 16 (11/20/22 0830)  BP: (!) 114/53 (11/20/22 0833)  SpO2: 100 % (11/20/22 0830) Vital Signs (24h Range):  Temp:  [97.5 °F (36.4 °C)-98.9 °F (37.2 °C)] 97.8 °F (36.6 °C)  Pulse:  [63-75] 71  Resp:  [16-20] 16  SpO2:  [89 %-100 %] 100 %  BP: (105-126)/(51-66) 114/53                            Closed/Suction Drain 11/14/22 1742 Posterior Neck Accordion 10 Fr. (Active)   Site Description Unable to view 11/19/22 0747   Dressing Status Clean;Dry;Intact 11/19/22 0747   Dressing Intervention Integrity maintained 11/19/22 0747   Drainage Sanguineous 11/19/22 0747   Status Open to gravity drainage 11/19/22 0747   Output (mL) 40 mL 11/19/22 0400       Female External Urinary Catheter 11/15/22 0800 (Active)   Skin no redness;no breakdown 11/19/22 0747   Tolerance no signs/symptoms of discomfort 11/19/22 0747   Suction Continuous suction at 60 mmHg 11/19/22 0747    Date of last wick change 11/19/22 11/19/22 0747   Time of last wick change 0754 11/19/22 0747   Output (mL) 500 mL 11/19/22 0400       Neurosurgery Physical Exam  General: well developed, well nourished, no distress.   Head: normocephalic, atraumatic  Neurologic: Alert and oriented. Thought content appropriate.  Language: No aphasia  Speech: No dysarthria  Cranial nerves: face symmetric, tongue midline, CN II-XII grossly intact.   Eyes: pupils equal, round, reactive to light with accommodation, EOMI.   Pulmonary: normal respirations, no signs of respiratory distress  Skin: Skin is warm, dry and intact.  Sensory: intact to light touch throughout  Motor Strength:Moves all extremities spontaneously with good tone.  Full strength upper and lower extremities. No abnormal movements seen.   Incision: Clean, dry, prineo  intact. Skin edges well approximated. No surrounding erythema or edema. No drainage or TTP.  HV drain x 1 to gravity with SS output    Significant Labs:  Recent Labs   Lab 11/19/22  0925   *   *   K 4.5      CO2 21*   BUN 14   CREATININE 0.8   CALCIUM 8.6*       Recent Labs   Lab 11/19/22  0925   WBC 11.62   HGB 10.4*   HCT 31.6*          No results for input(s): LABPT, INR, APTT in the last 48 hours.  Microbiology Results (last 7 days)       ** No results found for the last 168 hours. **          All pertinent labs from the last 24 hours have been reviewed.    Significant Diagnostics:  I have reviewed all pertinent imaging results/findings within the past 24 hours.  No results found in the last 24 hours.

## 2022-11-20 NOTE — ASSESSMENT & PLAN NOTE
Manisha Wynne is a 72 y.o. female with cervical myelopathy s/p C3-C6 posterior cervical fusion on 11/14 by Dr. Cheek     - Neuro stable on exam  - Incision with prineo in place. Do not apply anything to incision.   - HV drain to gravity. 100 cc output. D/c today  - Post op XR satisfactory  - C collar to be worn when upright and OOB  - Pain control- added oxy 10 q 4 hours prn severe pain. Increased gabapentin to 200 mg tid. Continue scheduled robaxin and tylenol.   - Elevated SBP: continue home meds. Labile SBP with elevations to 190. Increased losartan to 50 mg daily. CTM  - Duonebs/CPT q 8 hours for atelectasis prophylaxis   - Continue oral vancomycin in the post operative period for C diff prophylaxis. Patient has a history of severe C diff infection.  - DVT prophylaxis: LACHELLE's, SCD's, SQH  - Bowel regimen: senna BID and miralax daily. Last BM 11/18  - Atelectasis prevention: IS hourly while awake, PT/OT, OOB > 6 hours per day     Dispo: IPR likely monday

## 2022-11-20 NOTE — PROGRESS NOTES
[facility] - Neurosurgery ([facility])  Neurosurgery  Progress Note    Subjective:     History of Present Illness: Manisha Wynne is a 72 y.o. female with cervical stenosis and myelopathy. She has an MRI-non-compatible pacemaker in place. This will be due for replacement within the next few months.      Given the results of the myelogram, I expect that she would benefit from C3-C6 laminectomy and fusion, with undercutting of the C2 lamina. We had a lengthy and pleasant conversation about this procedure, including the associated hospitalization.       Post-Op Info:  Procedure(s) (LRB):  LAMINECTOMY, SPINE, CERVICAL, WITH POSTERIOR FUSION C3-C6 (N/A)   6 Days Post-Op     Interval History: 11/20: NAEON. AFVSS. Exam stable. HV in place; d/c today. Pending IPR, likely tomorrow.     Medications:  Continuous Infusions:  Scheduled Meds:   acetaminophen  1,000 mg Oral Q8H    albuterol-ipratropium  3 mL Nebulization Q8H    buPROPion  300 mg Oral Daily    ceFAZolin (ANCEF) IVPB  2 g Intravenous Q8H    dicyclomine  20 mg Oral Q6H    FLUoxetine  40 mg Oral Daily    gabapentin  200 mg Oral TID    heparin (porcine)  5,000 Units Subcutaneous Q8H    lisdexamfetamine  70 mg Oral QAM    losartan  50 mg Oral Daily    methocarbamoL  1,000 mg Oral Q8H    metoprolol tartrate  25 mg Oral BID    polyethylene glycol  17 g Oral Daily    pravastatin  20 mg Oral Daily    senna-docusate 8.6-50 mg  2 tablet Oral Daily     PRN Meds:aluminum-magnesium hydroxide-simethicone, glycerin adult, hydrALAZINE, melatonin, ondansetron, oxyCODONE, oxyCODONE, prochlorperazine     Review of Systems  Objective:     Weight: 71.9 kg (158 lb 8.2 oz)  Body mass index is 24.1 kg/m².  Vital Signs (Most Recent):  Temp: 97.8 °F (36.6 °C) (11/20/22 0830)  Pulse: 71 (11/20/22 0833)  Resp: 16 (11/20/22 0830)  BP: (!) 114/53 (11/20/22 0833)  SpO2: 100 % (11/20/22 0830) Vital Signs (24h Range):  Temp:  [97.5 °F (36.4 °C)-98.9 °F (37.2 °C)] 97.8 °F (36.6  °C)  Pulse:  [63-75] 71  Resp:  [16-20] 16  SpO2:  [89 %-100 %] 100 %  BP: (105-126)/(51-66) 114/53                            Closed/Suction Drain 11/14/22 1742 Posterior Neck Accordion 10 Fr. (Active)   Site Description Unable to view 11/19/22 0747   Dressing Status Clean;Dry;Intact 11/19/22 0747   Dressing Intervention Integrity maintained 11/19/22 0747   Drainage Sanguineous 11/19/22 0747   Status Open to gravity drainage 11/19/22 0747   Output (mL) 40 mL 11/19/22 0400       Female External Urinary Catheter 11/15/22 0800 (Active)   Skin no redness;no breakdown 11/19/22 0747   Tolerance no signs/symptoms of discomfort 11/19/22 0747   Suction Continuous suction at 60 mmHg 11/19/22 0747   Date of last wick change 11/19/22 11/19/22 0747   Time of last wick change 0754 11/19/22 0747   Output (mL) 500 mL 11/19/22 0400       Neurosurgery Physical Exam  General: well developed, well nourished, no distress.   Head: normocephalic, atraumatic  Neurologic: Alert and oriented. Thought content appropriate.  Language: No aphasia  Speech: No dysarthria  Cranial nerves: face symmetric, tongue midline, CN II-XII grossly intact.   Eyes: pupils equal, round, reactive to light with accommodation, EOMI.   Pulmonary: normal respirations, no signs of respiratory distress  Skin: Skin is warm, dry and intact.  Sensory: intact to light touch throughout  Motor Strength:Moves all extremities spontaneously with good tone.  Full strength upper and lower extremities. No abnormal movements seen.   Incision: Clean, dry, prineo  intact. Skin edges well approximated. No surrounding erythema or edema. No drainage or TTP.  HV drain x 1 to gravity with SS output    Significant Labs:  Recent Labs   Lab 11/19/22 0925   *   *   K 4.5      CO2 21*   BUN 14   CREATININE 0.8   CALCIUM 8.6*       Recent Labs   Lab 11/19/22 0925   WBC 11.62   HGB 10.4*   HCT 31.6*          No results for input(s): LABPT, INR, APTT in the last 48  hours.  Microbiology Results (last 7 days)       ** No results found for the last 168 hours. **          All pertinent labs from the last 24 hours have been reviewed.    Significant Diagnostics:  I have reviewed all pertinent imaging results/findings within the past 24 hours.  No results found in the last 24 hours.    Assessment/Plan:     * Cervical myelopathy  Manisha Wynne is a 72 y.o. female with cervical myelopathy s/p C3-C6 posterior cervical fusion on 11/14 by Dr. Cheek     - Neuro stable on exam  - Incision with prineo in place. Do not apply anything to incision.   - HV drain to gravity. 100 cc output. D/c today  - Post op XR satisfactory  - C collar to be worn when upright and OOB  - Pain control- added oxy 10 q 4 hours prn severe pain. Increased gabapentin to 200 mg tid. Continue scheduled robaxin and tylenol.   - Elevated SBP: continue home meds. Labile SBP with elevations to 190. Increased losartan to 50 mg daily. CTM  - Duonebs/CPT q 8 hours for atelectasis prophylaxis   - Continue oral vancomycin in the post operative period for C diff prophylaxis. Patient has a history of severe C diff infection.  - DVT prophylaxis: LACHELLE's, SCD's, SQH  - Bowel regimen: senna BID and miralax daily. Last BM 11/18  - Atelectasis prevention: IS hourly while awake, PT/OT, OOB > 6 hours per day     Dispo: IPR likely monday          Doc Maradiaga MD  Neurosurgery  [facility] - Neurosurgery ([facility])

## 2022-11-20 NOTE — PLAN OF CARE
Problem: Adult Inpatient Plan of Care  Goal: Optimal Comfort and Wellbeing  Outcome: Ongoing, Progressing     Problem: Infection  Goal: Absence of Infection Signs and Symptoms  Outcome: Ongoing, Progressing     Problem: Skin Injury Risk Increased  Goal: Skin Health and Integrity  Outcome: Ongoing, Progressing       POC reviewed with patient at the beside. Verbalization of understanding voiced. Questions and concerns addressed. Cardiac monitoring ongoing. Vital signs all shift. See flow sheet. Precautions maintained. Patient progressing towards goals. C-collar remains in place. Hemovac drain x 1 remains in place and open to gravity. Minimal output noted at present this shift. PRN Oxycodone x 1 given for c/o pain. PRN noted to be effective. Patient able to ambulate with walker and one person assist without difficulty. Bed low and locked with call light within reach and bed alarm activated.

## 2022-11-20 NOTE — PT/OT/SLP PROGRESS
Physical Therapy Treatment    Patient Name:  Manisha Wynne   MRN:  [id number]    Recommendations:     Discharge Recommendations:  rehabilitation facility   Discharge Equipment Recommendations:  (TBD, pending progress)   Barriers to discharge: Inaccessible home and Decreased caregiver support    Assessment:     Manisha Wynne is a 72 y.o. female admitted with a medical diagnosis of Cervical myelopathy.  She presents with the following impairments/functional limitations:  weakness, impaired endurance, impaired self care skills, impaired functional mobility, gait instability, impaired balance, pain, decreased ROM, impaired skin, orthopedic precautions . Patient ambulates with decreased aron and step length, but no loss of balance or safety issues noted during gait training. Patient's endurance has room for improvement and should progress with time.    Rehab Prognosis: Good; patient would benefit from acute skilled PT services to address these deficits and reach maximum level of function.    Recent Surgery: Procedure(s) (LRB):  LAMINECTOMY, SPINE, CERVICAL, WITH POSTERIOR FUSION C3-C6 (N/A) 6 Days Post-Op    Plan:     During this hospitalization, patient to be seen 4 x/week to address the identified rehab impairments via gait training, therapeutic activities, therapeutic exercises, neuromuscular re-education and progress toward the following goals:    Plan of Care Expires:  12/14/22    Subjective     Chief Complaint: pain with mobility, but getting better  Patient/Family Comments/goals: to go to Rehab.  Pain/Comfort:  Pain Rating 1: 2/10  Location - Side 1: Bilateral  Location - Orientation 1: generalized  Location 1: neck  Pain Addressed 1: Pre-medicate for activity, Reposition, Distraction  Pain Rating Post-Intervention 1: 5/10      Objective:     Communicated with NSG prior to session.  Patient found up in chair with cervical collar, hemovac upon PT entry to room.     General Precautions: Standard,  fall   Orthopedic Precautions:spinal precautions   Braces: Cervical collar  Respiratory Status: Room air     Functional Mobility:  Transfers:     Sit to Stand:  contact guard assistance with rolling walker  Gait: 38 ft x 2 trials with RW and CGA, with chair follow and cues to correct downward gaze.      AM-PAC 6 CLICK MOBILITY  Turning over in bed (including adjusting bedclothes, sheets and blankets)?: 4  Sitting down on and standing up from a chair with arms (e.g., wheelchair, bedside commode, etc.): 3  Moving from lying on back to sitting on the side of the bed?: 3  Moving to and from a bed to a chair (including a wheelchair)?: 3  Need to walk in hospital room?: 3  Climbing 3-5 steps with a railing?: 2  Basic Mobility Total Score: 18       Treatment & Education:  Donned a second gown. Educated on the importance of daily mobility and postural awareness during gait training. There ex in sitting: LAQ, HIP FLEX AND AP 2X12 REPS B LE.    Patient left up in chair with all lines intact, call button in reach, chair alarm on, and nsg notified..    GOALS:   Multidisciplinary Problems       Physical Therapy Goals          Problem: Physical Therapy    Goal Priority Disciplines Outcome Goal Variances Interventions   Physical Therapy Goal     PT, PT/OT Ongoing, Progressing     Description: Goals to be met by: 22     Patient will increase functional independence with mobility by performin. Supine to sit with [place]  2. Sit to supine with [place]  3. Sit to stand transfer with Supervision  4. Bed to chair transfer with Supervision using Rolling Walker  5. Gait  x 50 feet with Stand-by Assistance using Rolling Walker.   6. Ascend/descend 10 stair with right Handrails [place] using No Assistive Device.                          Time Tracking:     PT Received On: 22  PT Start Time: 1041     PT Stop Time: 1109  PT Total Time (min): 28 min     Billable Minutes: Gait Training 18 and Therapeutic  Exercise 10    Treatment Type: Treatment  PT/PTA: PTA     PTA Visit Number: 1     11/20/2022

## 2022-11-21 ENCOUNTER — TELEPHONE (OUTPATIENT)
Dept: NEUROSURGERY | Facility: CLINIC | Age: 72
End: 2022-11-21
Payer: MEDICARE

## 2022-11-21 DIAGNOSIS — M47.12 CERVICAL SPONDYLOSIS WITH MYELOPATHY: Primary | ICD-10-CM

## 2022-11-21 LAB
ANION GAP SERPL CALC-SCNC: 8 MMOL/L (ref 8–16)
BASOPHILS # BLD AUTO: 0.03 K/UL (ref 0–0.2)
BASOPHILS NFR BLD: 0.3 % (ref 0–1.9)
BUN SERPL-MCNC: 16 MG/DL (ref 8–23)
CALCIUM SERPL-MCNC: 9.1 MG/DL (ref 8.7–10.5)
CHLORIDE SERPL-SCNC: 104 MMOL/L (ref 95–110)
CO2 SERPL-SCNC: 22 MMOL/L (ref 23–29)
CREAT SERPL-MCNC: 0.9 MG/DL (ref 0.5–1.4)
DIFFERENTIAL METHOD: ABNORMAL
EOSINOPHIL # BLD AUTO: 0.4 K/UL (ref 0–0.5)
EOSINOPHIL NFR BLD: 4.3 % (ref 0–8)
ERYTHROCYTE [DISTWIDTH] IN BLOOD BY AUTOMATED COUNT: 14.4 % (ref 11.5–14.5)
EST. GFR  (NO RACE VARIABLE): >60 ML/MIN/1.73 M^2
GLUCOSE SERPL-MCNC: 112 MG/DL (ref 70–110)
HCT VFR BLD AUTO: 30.4 % (ref 37–48.5)
HGB BLD-MCNC: 9.7 G/DL (ref 12–16)
IMM GRANULOCYTES # BLD AUTO: 0.08 K/UL (ref 0–0.04)
IMM GRANULOCYTES NFR BLD AUTO: 0.9 % (ref 0–0.5)
LYMPHOCYTES # BLD AUTO: 1.8 K/UL (ref 1–4.8)
LYMPHOCYTES NFR BLD: 20.9 % (ref 18–48)
MCH RBC QN AUTO: 30.2 PG (ref 27–31)
MCHC RBC AUTO-ENTMCNC: 31.9 G/DL (ref 32–36)
MCV RBC AUTO: 95 FL (ref 82–98)
MONOCYTES # BLD AUTO: 1.2 K/UL (ref 0.3–1)
MONOCYTES NFR BLD: 14.2 % (ref 4–15)
NEUTROPHILS # BLD AUTO: 5.1 K/UL (ref 1.8–7.7)
NEUTROPHILS NFR BLD: 59.4 % (ref 38–73)
NRBC BLD-RTO: 0 /100 WBC
PLATELET # BLD AUTO: 181 K/UL (ref 150–450)
PMV BLD AUTO: 10 FL (ref 9.2–12.9)
POTASSIUM SERPL-SCNC: 4.3 MMOL/L (ref 3.5–5.1)
RBC # BLD AUTO: 3.21 M/UL (ref 4–5.4)
SODIUM SERPL-SCNC: 134 MMOL/L (ref 136–145)
WBC # BLD AUTO: 8.62 K/UL (ref 3.9–12.7)

## 2022-11-21 PROCEDURE — 63600175 PHARM REV CODE 636 W HCPCS: Performed by: STUDENT IN AN ORGANIZED HEALTH CARE EDUCATION/TRAINING PROGRAM

## 2022-11-21 PROCEDURE — 99024 POSTOP FOLLOW-UP VISIT: CPT | Mod: ,,, | Performed by: PHYSICIAN ASSISTANT

## 2022-11-21 PROCEDURE — 80048 BASIC METABOLIC PNL TOTAL CA: CPT | Performed by: STUDENT IN AN ORGANIZED HEALTH CARE EDUCATION/TRAINING PROGRAM

## 2022-11-21 PROCEDURE — 97535 SELF CARE MNGMENT TRAINING: CPT

## 2022-11-21 PROCEDURE — 97530 THERAPEUTIC ACTIVITIES: CPT

## 2022-11-21 PROCEDURE — 94664 DEMO&/EVAL PT USE INHALER: CPT

## 2022-11-21 PROCEDURE — 25000242 PHARM REV CODE 250 ALT 637 W/ HCPCS: Performed by: PHYSICIAN ASSISTANT

## 2022-11-21 PROCEDURE — 94640 AIRWAY INHALATION TREATMENT: CPT

## 2022-11-21 PROCEDURE — 25000003 PHARM REV CODE 250: Performed by: STUDENT IN AN ORGANIZED HEALTH CARE EDUCATION/TRAINING PROGRAM

## 2022-11-21 PROCEDURE — 11000001 HC ACUTE MED/SURG PRIVATE ROOM

## 2022-11-21 PROCEDURE — 97116 GAIT TRAINING THERAPY: CPT | Mod: CQ

## 2022-11-21 PROCEDURE — 99900035 HC TECH TIME PER 15 MIN (STAT)

## 2022-11-21 PROCEDURE — 94761 N-INVAS EAR/PLS OXIMETRY MLT: CPT

## 2022-11-21 PROCEDURE — 85025 COMPLETE CBC W/AUTO DIFF WBC: CPT | Performed by: STUDENT IN AN ORGANIZED HEALTH CARE EDUCATION/TRAINING PROGRAM

## 2022-11-21 PROCEDURE — 99024 PR POST-OP FOLLOW-UP VISIT: ICD-10-PCS | Mod: ,,, | Performed by: PHYSICIAN ASSISTANT

## 2022-11-21 PROCEDURE — 25000003 PHARM REV CODE 250: Performed by: PHYSICIAN ASSISTANT

## 2022-11-21 PROCEDURE — 36415 COLL VENOUS BLD VENIPUNCTURE: CPT | Performed by: STUDENT IN AN ORGANIZED HEALTH CARE EDUCATION/TRAINING PROGRAM

## 2022-11-21 PROCEDURE — 97530 THERAPEUTIC ACTIVITIES: CPT | Mod: CQ

## 2022-11-21 PROCEDURE — 27000646 HC AEROBIKA DEVICE

## 2022-11-21 RX ORDER — OXYCODONE HYDROCHLORIDE 10 MG/1
10 TABLET ORAL EVERY 4 HOURS PRN
Refills: 0 | Status: ON HOLD
Start: 2022-11-21 | End: 2022-12-13 | Stop reason: SDUPTHER

## 2022-11-21 RX ORDER — METHOCARBAMOL 1000 MG/1
1000 TABLET, COATED ORAL EVERY 8 HOURS
Status: ON HOLD
Start: 2022-11-21 | End: 2022-12-13 | Stop reason: HOSPADM

## 2022-11-21 RX ORDER — ACETAMINOPHEN 500 MG
1000 TABLET ORAL EVERY 8 HOURS
Refills: 0
Start: 2022-11-21 | End: 2024-02-27 | Stop reason: HOSPADM

## 2022-11-21 RX ORDER — SODIUM CHLORIDE 9 MG/ML
INJECTION, SOLUTION INTRAVENOUS CONTINUOUS
Status: ACTIVE | OUTPATIENT
Start: 2022-11-21 | End: 2022-11-21

## 2022-11-21 RX ADMIN — BUPROPION HYDROCHLORIDE 300 MG: 300 TABLET, FILM COATED, EXTENDED RELEASE ORAL at 08:11

## 2022-11-21 RX ADMIN — IPRATROPIUM BROMIDE AND ALBUTEROL SULFATE 3 ML: 2.5; .5 SOLUTION RESPIRATORY (INHALATION) at 11:11

## 2022-11-21 RX ADMIN — SODIUM CHLORIDE: 0.9 INJECTION, SOLUTION INTRAVENOUS at 08:11

## 2022-11-21 RX ADMIN — HEPARIN SODIUM 5000 UNITS: 5000 INJECTION INTRAVENOUS; SUBCUTANEOUS at 02:11

## 2022-11-21 RX ADMIN — METHOCARBAMOL 1000 MG: 500 TABLET ORAL at 05:11

## 2022-11-21 RX ADMIN — IPRATROPIUM BROMIDE AND ALBUTEROL SULFATE 3 ML: 2.5; .5 SOLUTION RESPIRATORY (INHALATION) at 03:11

## 2022-11-21 RX ADMIN — METHOCARBAMOL 1000 MG: 500 TABLET ORAL at 08:11

## 2022-11-21 RX ADMIN — GABAPENTIN 200 MG: 100 CAPSULE ORAL at 08:11

## 2022-11-21 RX ADMIN — POLYETHYLENE GLYCOL 3350 17 G: 17 POWDER, FOR SOLUTION ORAL at 08:11

## 2022-11-21 RX ADMIN — ACETAMINOPHEN 1000 MG: 325 TABLET ORAL at 05:11

## 2022-11-21 RX ADMIN — METOPROLOL TARTRATE 25 MG: 25 TABLET, FILM COATED ORAL at 08:11

## 2022-11-21 RX ADMIN — ACETAMINOPHEN 1000 MG: 325 TABLET ORAL at 02:11

## 2022-11-21 RX ADMIN — FLUOXETINE 40 MG: 20 CAPSULE ORAL at 08:11

## 2022-11-21 RX ADMIN — METHOCARBAMOL 1000 MG: 500 TABLET ORAL at 02:11

## 2022-11-21 RX ADMIN — HEPARIN SODIUM 5000 UNITS: 5000 INJECTION INTRAVENOUS; SUBCUTANEOUS at 05:11

## 2022-11-21 RX ADMIN — GABAPENTIN 200 MG: 100 CAPSULE ORAL at 02:11

## 2022-11-21 RX ADMIN — Medication 70 MG: at 06:11

## 2022-11-21 RX ADMIN — IPRATROPIUM BROMIDE AND ALBUTEROL SULFATE 3 ML: 2.5; .5 SOLUTION RESPIRATORY (INHALATION) at 07:11

## 2022-11-21 RX ADMIN — SENNOSIDES AND DOCUSATE SODIUM 2 TABLET: 50; 8.6 TABLET ORAL at 08:11

## 2022-11-21 RX ADMIN — PRAVASTATIN SODIUM 20 MG: 20 TABLET ORAL at 08:11

## 2022-11-21 RX ADMIN — HEPARIN SODIUM 5000 UNITS: 5000 INJECTION INTRAVENOUS; SUBCUTANEOUS at 08:11

## 2022-11-21 RX ADMIN — IPRATROPIUM BROMIDE AND ALBUTEROL SULFATE 3 ML: 2.5; .5 SOLUTION RESPIRATORY (INHALATION) at 12:11

## 2022-11-21 RX ADMIN — OXYCODONE HYDROCHLORIDE 10 MG: 10 TABLET ORAL at 08:11

## 2022-11-21 NOTE — PLAN OF CARE
[facility] - Neurosurgery (Hospital)  Discharge Reassessment    Primary Care Provider: Iris Blue MD    Expected Discharge Date: 11/21/2022    Rec had changed to Rehab.  MD wants patient to go to Rehab.  Patient wants to go to Rehab.  [facility] had accepted and auth is pending for Rehab.    UPDATE:  Peer to Peer scheduled for tomorrow for KALEB Naidu/NS at 11:00 for Rehab.    Reassessment (most recent)       Discharge Reassessment - 11/21/22 1015          Discharge Reassessment    Assessment Type Discharge Planning Reassessment     Did the patient's condition or plan change since previous assessment? No     Discharge Plan discussed with: Patient     Communicated CARO with patient/caregiver Yes     Discharge Plan A Rehab     Discharge Plan B Home Health     DME Needed Upon Discharge  none     Discharge Barriers Identified None     Why the patient remains in the hospital Placement issues;Insurance issues        Post-Acute Status    Post-Acute Authorization Placement     Post-Acute Placement Status Pending payor review/awaiting authorization (if required)     Discharge Delays None known at this time

## 2022-11-21 NOTE — ASSESSMENT & PLAN NOTE
Manisha Wynne is a 72 y.o. female with cervical myelopathy s/p C3-C6 posterior cervical fusion on 11/14 by Dr. Cheek     - Neuro stable on exam  - Incision with prineo in place. Do not apply anything to incision.   - HV drain discontinued 11/20  - Post op XR satisfactory  - C collar to be worn when upright and OOB  - Pain control- MM pain regimen  - Elevated SBP: Home losartan increased to 50 mg daily. SBP improved   - hyponatremia: 134 today. NS @ 75 ml/hr x 5 hours. CTM  - Duonebs/CPT q 8 hours for atelectasis prophylaxis   - Completed oral vancomycin while on abx therapy due to history of cdiff.   - DVT prophylaxis: LACHELLE's, SCD's, SQH  - Bowel regimen: senna BID and miralax daily. Last BM 11/18  - Atelectasis prevention: IS hourly while awake, PT/OT, OOB > 6 hours per day     Dispo: IPR, medically ready

## 2022-11-21 NOTE — PLAN OF CARE
[facility]    FACILITY TRANSFER ORDERS      Patient Name: Manisha Wynne  YOB: 1950    PCP: Iris Blue MD   PCP Address: [facility], [address] / [place] [address]  PCP Phone Number: [phone]  PCP Fax: [phone]    Encounter Date: 11/23/2022    Admit to: SNF    Vital Signs:  Routine    Diagnoses:   Active Hospital Problems    Diagnosis  POA    *Cervical myelopathy [G95.9]  Yes      Resolved Hospital Problems   No resolved problems to display.       Allergies:Review of patient's allergies indicates:  No Known Allergies    Diet: regular diet    Activities: Activity as tolerated    Goals of Care Treatment Preferences:  Code Status: Full Code    Living Will: Yes              Labs, DVT prophylaxis, and bowel regimen per facility protocol    CONSULTS:    Physical Therapy to evaluate and treat.  and Occupational Therapy to evaluate and treat.      WOUND CARE ORDERS  Prineo in place to incision. Please do not remove. Cover incision when showering. Do not apply any creams or ointments to incision.     Medications: Review discharge medications with patient and family and provide education.      Current Discharge Medication List        START taking these medications    Details   acetaminophen (TYLENOL) 500 MG tablet Take 2 tablets (1,000 mg total) by mouth every 8 (eight) hours.  Refills: 0      methocarbamoL 1,000 mg Tab Take 1,000 mg by mouth every 8 (eight) hours. for 10 days      oxyCODONE (ROXICODONE) 10 mg Tab immediate release tablet Take 1 tablet (10 mg total) by mouth every 4 (four) hours as needed for Pain.  Refills: 0    Comments: Quantity prescribed more than 7 day supply? Yes, quantity medically necessary           CONTINUE these medications which have NOT CHANGED    Details   buPROPion (WELLBUTRIN XL) 300 MG 24 hr tablet Take 300 mg by mouth once daily.       FLUoxetine 40 MG capsule Take 40 mg by mouth once daily.      losartan (COZAAR) 25 MG  tablet TAKE 1 TABLET EVERY DAY  Qty: 90 tablet, Refills: 6      metoprolol tartrate (LOPRESSOR) 25 MG tablet TAKE 1 TABLET TWICE DAILY  Qty: 180 tablet, Refills: 3    Associated Diagnoses: Hypertension, essential      calcium-vitamin D 600 mg(1,500mg) -200 unit Tab Take 1 tablet by mouth once daily.       cholecalciferol, vitamin D3, 125 mcg (5,000 unit) Tab Take 5,000 Units by mouth once daily.      dicyclomine (BENTYL) 20 mg tablet Take 20 mg by mouth every 6 (six) hours. Takes prn      gabapentin (NEURONTIN) 100 MG capsule Take 100 mg by mouth 3 (three) times daily.      pravastatin (PRAVACHOL) 20 MG tablet TAKE 1 TABLET EVERY DAY  Qty: 90 tablet, Refills: 3    Associated Diagnoses: Mixed hyperlipidemia           STOP taking these medications       estradioL (ESTRACE) 0.01 % (0.1 mg/gram) vaginal cream Comments:   Reason for Stopping:         lisdexamfetamine (VYVANSE) 70 MG capsule Comments:   Reason for Stopping:         aspirin (ECOTRIN) 81 MG EC tablet Comments:   Reason for Stopping:         PROLIA 60 mg/mL Syrg Comments:   Reason for Stopping:                  Immunizations Administered as of 11/23/2022       Name Date Dose VIS Date Route Exp Date    COVID-19, MRNA, LN-S, PF (Moderna) 2/4/2021 11:30 AM 0.5 mL 12/19/2020 Intramuscular 12/31/2069    Site: Left deltoid     Given By: Desi Murillo, Rachael     : Moderna [employer]     Lot: [id number]     COVID-19, MRNA, LN-S, PF (Moderna) 1/7/2021 11:31 AM 0.5 mL 12/19/2020 Intramuscular 12/31/2069    Site: Left deltoid     Given By: Desi Murillo PharmD     : Moderna [employer]     Lot: [id number]             This patient has had both covid vaccinations    Some patients may experience side effects after vaccination.  These may include fever, headache, muscle or joint aches.  Most symptoms resolve with 24-48 hours and do not require urgent medical evaluation unless they persist for more than 72 hours or symptoms are concerning for an unrelated  medical condition.          _________________________________  Lyric Quezada PA-C  11/23/2022

## 2022-11-21 NOTE — PT/OT/SLP PROGRESS
"Occupational Therapy   Treatment    Name: Manisha Wynne  MRN: [id number]  Admitting Diagnosis:  Cervical myelopathy  7 Days Post-Op    Recommendations:     Discharge Recommendations: rehabilitation facility  Discharge Equipment Recommendations:  bedside commode  Barriers to discharge:  Decreased caregiver support    Assessment:     Manisha Wynne is a 72 y.o. female with a medical diagnosis of Cervical myelopathy.  She presents with the following performance deficits affecting function are weakness, impaired endurance, impaired self care skills, impaired functional mobility, gait instability, impaired balance, pain, orthopedic precautions. MD exiting pt's room upon OT entrance, MD stating pt able to don c-collar while sitting EOB. Educated pt on don/doffing c-collar, requiring max A while sitting EOB, progressing to min A while seated in front of mirror. Pt requires occasional verbal cues for body mechanics to adhere to spinal precautions. Pt is motivated to participate with therapy and is making progress, but remains well below IND PLOF. Pt would benefit from continued skilled acute OT services in order to maximize independence and safety with ADLs and functional mobility to ensure safe return to PLOF in the least restrictive environment. OT recommending Rehab once pt is medically appropriate for d/c.     Rehab Prognosis:  Good; patient would benefit from acute skilled OT services to address these deficits and reach maximum level of function.       Plan:     Patient to be seen 4 x/week to address the above listed problems via self-care/home management, therapeutic activities, therapeutic exercises  Plan of Care Expires: 12/15/22  Plan of Care Reviewed with: patient    Subjective   "If I can see it once in the mirror, then I can visualize it when I do it next time"  Pain/Comfort:  Pain Rating 1: 0/10  Pain Rating Post-Intervention 1:  (not rated, but reports mild neck pain at end of " session)    Objective:     Communicated with: RN prior to session.  Patient found supine with peripheral IV upon OT entry to room.    General Precautions: Standard, fall   Orthopedic Precautions:spinal precautions   Braces: Aspen collar  Respiratory Status: Room air     Occupational Performance:     Bed Mobility:    Patient completed Rolling/Turning to Right with stand by assistance utilizing log roll technique  Patient completed Supine to Sit with minimum assistance     Functional Mobility/Transfers:  Patient completed Sit <> Stand Transfer with stand by assistance  with  rolling walker   Patient completed Toilet <> Chair Transfer using Step Transfer technique with contact guard assistance with rolling walker  Patient completed Toilet Transfer Step Transfer technique with contact guard assistance with  rolling walker  3-in-1 placed over toilet for raised surface height   Functional Mobility: Pt completed functional mobility of household distance to/from bathroom w/ CGA and RW.     Activities of Daily Living:  Grooming: stand by assistance to wash hands while standing at sink  Upper Body Dressing: moderate assistance to don c-collar while sitting EOB; min A to doff/don while seated on 3-in-1 in bathroom using mirror to assist w/ visualization of c-collar mgmt  Lower Body Dressing: maximal assistance to don new brief, requiring A to thread BLE while seated  Toileting: stand by assistance for pericares while sitting on toilet. Max A for clothing mgmt in stance  Required cues to turn body to flush toilet to adhere to spinal precautions       [facility] 6 Click ADL: 19    Treatment & Education:  Educated pt on how to don/doff c-collar, as well as educated pt on changing out pads of c-collar  Therapist provided facilitation and instruction of proper body mechanics and fall prevention strategies during tasks listed above.  Instructed patient to sit in bedside chair daily to increase OOB/activity tolerance.  Instructed patient  to use call light to have nursing staff assist with needs/transfers.  Discussed OT POC and answered all questions within OT scope of practice.      Patient left up in chair with all lines intact, call button in reach, chair alarm on, and RN notified    GOALS:   Multidisciplinary Problems       Occupational Therapy Goals          Problem: Occupational Therapy    Goal Priority Disciplines Outcome Interventions   Occupational Therapy Goal     OT, PT/OT Ongoing, Progressing    Description: Goals to be met by: 11/29/22     Patient will increase functional independence with ADLs by performing:    UE Dressing with Stand-by Assistance.  LE Dressing with Stand-by Assistance and Assistive Devices as needed.  Grooming while standing at sink with Stand-by Assistance.  Toileting from toilet with Stand-by Assistance for hygiene and clothing management.   Toilet transfer to toilet with Stand-by Assistance.                         Time Tracking:     OT Date of Treatment: 11/21/22  OT Start Time: 0857  OT Stop Time: 0937  OT Total Time (min): 40 min    Billable Minutes:Self Care/Home Management 30  Therapeutic Activity 10    OT/DESTINY: OT     DESTINY Visit Number: 0    11/21/2022

## 2022-11-21 NOTE — PROGRESS NOTES
[facility] - Neurosurgery ([facility])  Neurosurgery  Progress Note    Subjective:     History of Present Illness: Manisha Wynne is a 72 y.o. female with cervical stenosis and myelopathy. She has an MRI-non-compatible pacemaker in place. This will be due for replacement within the next few months.      Given the results of the myelogram, I expect that she would benefit from C3-C6 laminectomy and fusion, with undercutting of the C2 lamina. We had a lengthy and pleasant conversation about this procedure, including the associated hospitalization.       Post-Op Info:  Procedure(s) (LRB):  LAMINECTOMY, SPINE, CERVICAL, WITH POSTERIOR FUSION C3-C6 (N/A)   7 Days Post-Op     Interval History: NAEON. Exam stable. Voiding spontaneously, BM post op. Na Pending IPR, medically stable. Na 134, started on NS @ 75 ml/hr x 5 hours. CTM    Medications:  Continuous Infusions:   sodium chloride 0.9% 75 mL/hr at 11/21/22 0821     Scheduled Meds:   acetaminophen  1,000 mg Oral Q8H    albuterol-ipratropium  3 mL Nebulization Q8H    buPROPion  300 mg Oral Daily    dicyclomine  20 mg Oral Q6H    FLUoxetine  40 mg Oral Daily    gabapentin  200 mg Oral TID    heparin (porcine)  5,000 Units Subcutaneous Q8H    lisdexamfetamine  70 mg Oral QAM    losartan  50 mg Oral Daily    methocarbamoL  1,000 mg Oral Q8H    metoprolol tartrate  25 mg Oral BID    polyethylene glycol  17 g Oral Daily    pravastatin  20 mg Oral Daily    senna-docusate 8.6-50 mg  2 tablet Oral Daily     PRN Meds:aluminum-magnesium hydroxide-simethicone, glycerin adult, hydrALAZINE, melatonin, ondansetron, oxyCODONE, oxyCODONE, prochlorperazine     Review of Systems  Objective:     Weight: 71.9 kg (158 lb 8.2 oz)  Body mass index is 24.1 kg/m².  Vital Signs (Most Recent):  Temp: 98.3 °F (36.8 °C) (11/21/22 0819)  Pulse: 75 (11/21/22 0835)  Resp: 18 (11/21/22 0819)  BP: (!) 100/52 (11/21/22 0835)  SpO2: 96 % (11/21/22 0819)   Vital Signs (24h Range):  Temp:   [97.5 °F (36.4 °C)-98.6 °F (37 °C)] 98.3 °F (36.8 °C)  Pulse:  [61-76] 75  Resp:  [14-18] 18  SpO2:  [95 %-100 %] 96 %  BP: ()/(48-59) 100/52                     Female External Urinary Catheter 11/15/22 0800 (Active)   Skin no redness;no breakdown 11/20/22 1800   Tolerance no signs/symptoms of discomfort 11/20/22 1800   Suction Continuous suction at 60 mmHg 11/20/22 0800   Date of last wick change 11/19/22 11/19/22 0747   Time of last wick change 0754 11/19/22 0747   Output (mL) 500 mL 11/19/22 0400       Physical Exam    Neurosurgery Physical Exam  General: well developed, well nourished, no distress.   Head: normocephalic, atraumatic  Neurologic: Alert and oriented. Thought content appropriate.  Language: No aphasia  Speech: No dysarthria  Cranial nerves: face symmetric, tongue midline, CN II-XII grossly intact.   Eyes: pupils equal, round, reactive to light with accommodation, EOMI.   Pulmonary: normal respirations, no signs of respiratory distress  Skin: Skin is warm, dry and intact.  Sensory: intact to light touch throughout  Motor Strength:Moves all extremities spontaneously with good tone.  Full strength upper and lower extremities. No abnormal movements seen.   Incision: Clean, dry, prineo  intact. Skin edges well approximated. No surrounding erythema or edema. No drainage or TTP.     Significant Labs:  Recent Labs   Lab 11/19/22 0925 11/21/22  0413   * 112*   * 134*   K 4.5 4.3    104   CO2 21* 22*   BUN 14 16   CREATININE 0.8 0.9   CALCIUM 8.6* 9.1     Recent Labs   Lab 11/19/22  0925 11/21/22  0413   WBC 11.62 8.62   HGB 10.4* 9.7*   HCT 31.6* 30.4*    181     No results for input(s): LABPT, INR, APTT in the last 48 hours.  Microbiology Results (last 7 days)       ** No results found for the last 168 hours. **          All pertinent labs from the last 24 hours have been reviewed.    Significant Diagnostics:  No results found in the last 24 hours.      Assessment/Plan:      * Cervical myelopathy  Manisha Wynne is a 72 y.o. female with cervical myelopathy s/p C3-C6 posterior cervical fusion on 11/14 by Dr. Cheek     - Neuro stable on exam  - Incision with prineo in place. Do not apply anything to incision.   - HV drain discontinued 11/20  - Post op XR satisfactory  - C collar to be worn when upright and OOB  - Pain control- MM pain regimen  - Elevated SBP: Home losartan increased to 50 mg daily. SBP improved   - hyponatremia: 134 today. NS @ 75 ml/hr x 5 hours. CTM  - Duonebs/CPT q 8 hours for atelectasis prophylaxis   - Completed oral vancomycin while on abx therapy due to history of cdiff.   - DVT prophylaxis: LACHELLE's, SCD's, SQH  - Bowel regimen: senna BID and miralax daily. Last BM 11/18  - Atelectasis prevention: IS hourly while awake, PT/OT, OOB > 6 hours per day     Dispo: IPR, medically ready          Lyric Quezada PA-C  Neurosurgery  [facility] - Neurosurgery ([facility])

## 2022-11-21 NOTE — PLAN OF CARE
Problem: Adult Inpatient Plan of Care  Goal: Optimal Comfort and Wellbeing  Outcome: Ongoing, Progressing     Problem: Infection  Goal: Absence of Infection Signs and Symptoms  Outcome: Ongoing, Progressing     Problem: Skin Injury Risk Increased  Goal: Skin Health and Integrity  Outcome: Ongoing, Progressing       POC reviewed with patient at the beside. Verbalization of understanding voiced. Questions and concerns addressed. Cardiac monitoring ongoing. Vital signs all shift. See flow sheet. Precautions maintained. Patient progressing towards goals. C-collar remains in place.  PRN Oxycodone x 1 given for c/o pain. PRN noted to be effective. Patient able to ambulate with walker and one person assist without difficulty. Bed low and locked with call light within reach and bed alarm refused but patient will use call light to request staff assistance.

## 2022-11-21 NOTE — SUBJECTIVE & OBJECTIVE
Interval History: NAEON. Exam stable. Voiding spontaneously, BM post op. Na Pending IPR, medically stable. Na 134, started on NS @ 75 ml/hr x 5 hours. CTM    Medications:  Continuous Infusions:   sodium chloride 0.9% 75 mL/hr at 11/21/22 0821     Scheduled Meds:   acetaminophen  1,000 mg Oral Q8H    albuterol-ipratropium  3 mL Nebulization Q8H    buPROPion  300 mg Oral Daily    dicyclomine  20 mg Oral Q6H    FLUoxetine  40 mg Oral Daily    gabapentin  200 mg Oral TID    heparin (porcine)  5,000 Units Subcutaneous Q8H    lisdexamfetamine  70 mg Oral QAM    losartan  50 mg Oral Daily    methocarbamoL  1,000 mg Oral Q8H    metoprolol tartrate  25 mg Oral BID    polyethylene glycol  17 g Oral Daily    pravastatin  20 mg Oral Daily    senna-docusate 8.6-50 mg  2 tablet Oral Daily     PRN Meds:aluminum-magnesium hydroxide-simethicone, glycerin adult, hydrALAZINE, melatonin, ondansetron, oxyCODONE, oxyCODONE, prochlorperazine     Review of Systems  Objective:     Weight: 71.9 kg (158 lb 8.2 oz)  Body mass index is 24.1 kg/m².  Vital Signs (Most Recent):  Temp: 98.3 °F (36.8 °C) (11/21/22 0819)  Pulse: 75 (11/21/22 0835)  Resp: 18 (11/21/22 0819)  BP: (!) 100/52 (11/21/22 0835)  SpO2: 96 % (11/21/22 0819)   Vital Signs (24h Range):  Temp:  [97.5 °F (36.4 °C)-98.6 °F (37 °C)] 98.3 °F (36.8 °C)  Pulse:  [61-76] 75  Resp:  [14-18] 18  SpO2:  [95 %-100 %] 96 %  BP: ()/(48-59) 100/52                     Female External Urinary Catheter 11/15/22 0800 (Active)   Skin no redness;no breakdown 11/20/22 1800   Tolerance no signs/symptoms of discomfort 11/20/22 1800   Suction Continuous suction at 60 mmHg 11/20/22 0800   Date of last wick change 11/19/22 11/19/22 0747   Time of last wick change 0754 11/19/22 0747   Output (mL) 500 mL 11/19/22 0400       Physical Exam    Neurosurgery Physical Exam  General: well developed, well nourished, no distress.   Head: normocephalic, atraumatic  Neurologic: Alert and oriented. Thought  content appropriate.  Language: No aphasia  Speech: No dysarthria  Cranial nerves: face symmetric, tongue midline, CN II-XII grossly intact.   Eyes: pupils equal, round, reactive to light with accommodation, EOMI.   Pulmonary: normal respirations, no signs of respiratory distress  Skin: Skin is warm, dry and intact.  Sensory: intact to light touch throughout  Motor Strength:Moves all extremities spontaneously with good tone.  Full strength upper and lower extremities. No abnormal movements seen.   Incision: Clean, dry, prineo  intact. Skin edges well approximated. No surrounding erythema or edema. No drainage or TTP.     Significant Labs:  Recent Labs   Lab 11/19/22 0925 11/21/22  0413   * 112*   * 134*   K 4.5 4.3    104   CO2 21* 22*   BUN 14 16   CREATININE 0.8 0.9   CALCIUM 8.6* 9.1     Recent Labs   Lab 11/19/22 0925 11/21/22  0413   WBC 11.62 8.62   HGB 10.4* 9.7*   HCT 31.6* 30.4*    181     No results for input(s): LABPT, INR, APTT in the last 48 hours.  Microbiology Results (last 7 days)       ** No results found for the last 168 hours. **          All pertinent labs from the last 24 hours have been reviewed.    Significant Diagnostics:  No results found in the last 24 hours.

## 2022-11-21 NOTE — PT/OT/SLP PROGRESS
Physical Therapy Treatment    Patient Name:  Manisha Wynne   MRN:  [id number]    Recommendations:     Discharge Recommendations:  rehabilitation facility   Discharge Equipment Recommendations:  (TBD, pending progress)   Barriers to discharge: Inaccessible home and Decreased caregiver support    Assessment:     Manisha Wynne is a 72 y.o. female admitted with a medical diagnosis of Cervical myelopathy.  She presents with the following impairments/functional limitations:  weakness, impaired endurance, impaired self care skills, gait instability, impaired functional mobility, impaired balance, decreased safety awareness. Pt participated, and tolerated treatment well. Pt will continue to benefit from skilled PT services to improve all deficits noted above. Resume PT POC as indicated.     Rehab Prognosis: Good; patient would benefit from acute skilled PT services to address these deficits and reach maximum level of function.    Recent Surgery: Procedure(s) (LRB):  LAMINECTOMY, SPINE, CERVICAL, WITH POSTERIOR FUSION C3-C6 (N/A) 7 Days Post-Op    Plan:     During this hospitalization, patient to be seen 4 x/week to address the identified rehab impairments via gait training, therapeutic activities, therapeutic exercises, neuromuscular re-education and progress toward the following goals:    Plan of Care Expires:  12/14/22    Subjective     Chief Complaint: none stated  Patient/Family Comments/goals: none stated  Pain/Comfort:  Pain Rating 1: 0/10  Pain Rating Post-Intervention 1: 0/10      Objective:     Communicated with nursing prior to session.  Patient found HOB elevated with  (all lines intact and nurse present) upon PT entry to room.     General Precautions: Standard, fall   Orthopedic Precautions:spinal precautions   Braces: Cervical collar  Respiratory Status: Room air     Functional Mobility:  Bed Mobility:  Rolling Left:  stand by assistance  Rolling Right: stand by assistance  Scooting: stand by  assistance  Supine to Sit: stand by assistance  Transfers:  Sit to Stand:  contact guard assistance with rolling walker  Bed<>Chair: contact guard assistance with  rolling walker  using  Stand Pivot  Gait: ~48ft with RW and CGA. Distance limited 2/2 fatigue. Slow pace.   Balance: Pt sat EOB SPV; Pt stood with RW and CG-SBA for safety.       AM-PAC 6 CLICK MOBILITY  Turning over in bed (including adjusting bedclothes, sheets and blankets)?: 4  Sitting down on and standing up from a chair with arms (e.g., wheelchair, bedside commode, etc.): 3  Moving from lying on back to sitting on the side of the bed?: 3  Moving to and from a bed to a chair (including a wheelchair)?: 3  Need to walk in hospital room?: 3  Climbing 3-5 steps with a railing?: 2  Basic Mobility Total Score: 18       Treatment & Education:  -Donned cervical collar at start of tx session.   -Answered all questions/concerns within PTA scope of practice.   -BLE therex 3x10 reps: AP,LAQ,HF,Hip abd/add  -Reviewed/discussed spinal precautions.    Patient left up in chair with all lines intact, call button in reach, chair alarm on, and nursing notified..    GOALS:   Multidisciplinary Problems       Physical Therapy Goals          Problem: Physical Therapy    Goal Priority Disciplines Outcome Goal Variances Interventions   Physical Therapy Goal     PT, PT/OT Ongoing, Progressing     Description: Goals to be met by: 22     Patient will increase functional independence with mobility by performin. Supine to sit with [place]  2. Sit to supine with [place]  3. Sit to stand transfer with Supervision  4. Bed to chair transfer with Supervision using Rolling Walker  5. Gait  x 50 feet with Stand-by Assistance using Rolling Walker.   6. Ascend/descend 10 stair with right Handrails [place] using No Assistive Device.                          Time Tracking:     PT Received On: 22  PT Start Time: 1420     PT Stop Time: 1458  PT Total Time  (min): 38 min     Billable Minutes: Gait Training 11 and Therapeutic Activity 27    Treatment Type: Treatment  PT/PTA: PTA     PTA Visit Number: 1     11/21/2022

## 2022-11-22 LAB
ANION GAP SERPL CALC-SCNC: 8 MMOL/L (ref 8–16)
BASOPHILS # BLD AUTO: 0.02 K/UL (ref 0–0.2)
BASOPHILS NFR BLD: 0.3 % (ref 0–1.9)
BILIRUB UR QL STRIP: NEGATIVE
BUN SERPL-MCNC: 14 MG/DL (ref 8–23)
CALCIUM SERPL-MCNC: 9 MG/DL (ref 8.7–10.5)
CHLORIDE SERPL-SCNC: 106 MMOL/L (ref 95–110)
CLARITY UR REFRACT.AUTO: CLEAR
CO2 SERPL-SCNC: 20 MMOL/L (ref 23–29)
COLOR UR AUTO: YELLOW
CREAT SERPL-MCNC: 0.8 MG/DL (ref 0.5–1.4)
DIFFERENTIAL METHOD: ABNORMAL
EOSINOPHIL # BLD AUTO: 0.3 K/UL (ref 0–0.5)
EOSINOPHIL NFR BLD: 4.1 % (ref 0–8)
ERYTHROCYTE [DISTWIDTH] IN BLOOD BY AUTOMATED COUNT: 14.3 % (ref 11.5–14.5)
EST. GFR  (NO RACE VARIABLE): >60 ML/MIN/1.73 M^2
GLUCOSE SERPL-MCNC: 99 MG/DL (ref 70–110)
GLUCOSE UR QL STRIP: NEGATIVE
HCT VFR BLD AUTO: 27.9 % (ref 37–48.5)
HGB BLD-MCNC: 9.1 G/DL (ref 12–16)
HGB UR QL STRIP: NEGATIVE
IMM GRANULOCYTES # BLD AUTO: 0.07 K/UL (ref 0–0.04)
IMM GRANULOCYTES NFR BLD AUTO: 0.9 % (ref 0–0.5)
KETONES UR QL STRIP: NEGATIVE
LEUKOCYTE ESTERASE UR QL STRIP: NEGATIVE
LYMPHOCYTES # BLD AUTO: 1.6 K/UL (ref 1–4.8)
LYMPHOCYTES NFR BLD: 20.6 % (ref 18–48)
MCH RBC QN AUTO: 30.4 PG (ref 27–31)
MCHC RBC AUTO-ENTMCNC: 32.6 G/DL (ref 32–36)
MCV RBC AUTO: 93 FL (ref 82–98)
MONOCYTES # BLD AUTO: 1.2 K/UL (ref 0.3–1)
MONOCYTES NFR BLD: 15.2 % (ref 4–15)
NEUTROPHILS # BLD AUTO: 4.6 K/UL (ref 1.8–7.7)
NEUTROPHILS NFR BLD: 58.9 % (ref 38–73)
NITRITE UR QL STRIP: NEGATIVE
NRBC BLD-RTO: 0 /100 WBC
PH UR STRIP: 6 [PH] (ref 5–8)
PLATELET # BLD AUTO: 189 K/UL (ref 150–450)
PMV BLD AUTO: 9.9 FL (ref 9.2–12.9)
POTASSIUM SERPL-SCNC: 4.6 MMOL/L (ref 3.5–5.1)
PROT UR QL STRIP: NEGATIVE
RBC # BLD AUTO: 2.99 M/UL (ref 4–5.4)
SODIUM SERPL-SCNC: 134 MMOL/L (ref 136–145)
SP GR UR STRIP: 1.01 (ref 1–1.03)
URN SPEC COLLECT METH UR: NORMAL
WBC # BLD AUTO: 7.83 K/UL (ref 3.9–12.7)

## 2022-11-22 PROCEDURE — 36415 COLL VENOUS BLD VENIPUNCTURE: CPT | Performed by: STUDENT IN AN ORGANIZED HEALTH CARE EDUCATION/TRAINING PROGRAM

## 2022-11-22 PROCEDURE — 25000242 PHARM REV CODE 250 ALT 637 W/ HCPCS: Performed by: PHYSICIAN ASSISTANT

## 2022-11-22 PROCEDURE — 27000646 HC AEROBIKA DEVICE

## 2022-11-22 PROCEDURE — 94664 DEMO&/EVAL PT USE INHALER: CPT

## 2022-11-22 PROCEDURE — 85025 COMPLETE CBC W/AUTO DIFF WBC: CPT | Performed by: STUDENT IN AN ORGANIZED HEALTH CARE EDUCATION/TRAINING PROGRAM

## 2022-11-22 PROCEDURE — 81003 URINALYSIS AUTO W/O SCOPE: CPT | Performed by: PHYSICIAN ASSISTANT

## 2022-11-22 PROCEDURE — 63600175 PHARM REV CODE 636 W HCPCS: Performed by: STUDENT IN AN ORGANIZED HEALTH CARE EDUCATION/TRAINING PROGRAM

## 2022-11-22 PROCEDURE — 94640 AIRWAY INHALATION TREATMENT: CPT

## 2022-11-22 PROCEDURE — 99024 POSTOP FOLLOW-UP VISIT: CPT | Mod: ,,, | Performed by: PHYSICIAN ASSISTANT

## 2022-11-22 PROCEDURE — 99024 PR POST-OP FOLLOW-UP VISIT: ICD-10-PCS | Mod: ,,, | Performed by: PHYSICIAN ASSISTANT

## 2022-11-22 PROCEDURE — 25000003 PHARM REV CODE 250: Performed by: STUDENT IN AN ORGANIZED HEALTH CARE EDUCATION/TRAINING PROGRAM

## 2022-11-22 PROCEDURE — 97535 SELF CARE MNGMENT TRAINING: CPT

## 2022-11-22 PROCEDURE — 87040 BLOOD CULTURE FOR BACTERIA: CPT | Mod: 59 | Performed by: PHYSICIAN ASSISTANT

## 2022-11-22 PROCEDURE — 25000003 PHARM REV CODE 250: Performed by: PHYSICIAN ASSISTANT

## 2022-11-22 PROCEDURE — 99900035 HC TECH TIME PER 15 MIN (STAT)

## 2022-11-22 PROCEDURE — 11000001 HC ACUTE MED/SURG PRIVATE ROOM

## 2022-11-22 PROCEDURE — 94761 N-INVAS EAR/PLS OXIMETRY MLT: CPT

## 2022-11-22 PROCEDURE — 97530 THERAPEUTIC ACTIVITIES: CPT

## 2022-11-22 PROCEDURE — 80048 BASIC METABOLIC PNL TOTAL CA: CPT | Performed by: STUDENT IN AN ORGANIZED HEALTH CARE EDUCATION/TRAINING PROGRAM

## 2022-11-22 PROCEDURE — 36415 COLL VENOUS BLD VENIPUNCTURE: CPT | Performed by: PHYSICIAN ASSISTANT

## 2022-11-22 RX ADMIN — ACETAMINOPHEN 1000 MG: 325 TABLET ORAL at 09:11

## 2022-11-22 RX ADMIN — METOPROLOL TARTRATE 25 MG: 25 TABLET, FILM COATED ORAL at 08:11

## 2022-11-22 RX ADMIN — GABAPENTIN 200 MG: 100 CAPSULE ORAL at 02:11

## 2022-11-22 RX ADMIN — METHOCARBAMOL 1000 MG: 500 TABLET ORAL at 02:11

## 2022-11-22 RX ADMIN — METHOCARBAMOL 1000 MG: 500 TABLET ORAL at 06:11

## 2022-11-22 RX ADMIN — Medication 70 MG: at 08:11

## 2022-11-22 RX ADMIN — BUPROPION HYDROCHLORIDE 300 MG: 300 TABLET, FILM COATED, EXTENDED RELEASE ORAL at 08:11

## 2022-11-22 RX ADMIN — PRAVASTATIN SODIUM 20 MG: 20 TABLET ORAL at 09:11

## 2022-11-22 RX ADMIN — HEPARIN SODIUM 5000 UNITS: 5000 INJECTION INTRAVENOUS; SUBCUTANEOUS at 09:11

## 2022-11-22 RX ADMIN — METOPROLOL TARTRATE 25 MG: 25 TABLET, FILM COATED ORAL at 09:11

## 2022-11-22 RX ADMIN — OXYCODONE HYDROCHLORIDE 10 MG: 10 TABLET ORAL at 06:11

## 2022-11-22 RX ADMIN — HEPARIN SODIUM 5000 UNITS: 5000 INJECTION INTRAVENOUS; SUBCUTANEOUS at 02:11

## 2022-11-22 RX ADMIN — GABAPENTIN 200 MG: 100 CAPSULE ORAL at 08:11

## 2022-11-22 RX ADMIN — IPRATROPIUM BROMIDE AND ALBUTEROL SULFATE 3 ML: 2.5; .5 SOLUTION RESPIRATORY (INHALATION) at 08:11

## 2022-11-22 RX ADMIN — ACETAMINOPHEN 1000 MG: 325 TABLET ORAL at 02:11

## 2022-11-22 RX ADMIN — SENNOSIDES AND DOCUSATE SODIUM 2 TABLET: 50; 8.6 TABLET ORAL at 08:11

## 2022-11-22 RX ADMIN — METHOCARBAMOL 1000 MG: 500 TABLET ORAL at 09:11

## 2022-11-22 RX ADMIN — IPRATROPIUM BROMIDE AND ALBUTEROL SULFATE 3 ML: 2.5; .5 SOLUTION RESPIRATORY (INHALATION) at 03:11

## 2022-11-22 RX ADMIN — LOSARTAN POTASSIUM 50 MG: 50 TABLET, FILM COATED ORAL at 08:11

## 2022-11-22 RX ADMIN — FLUOXETINE 40 MG: 20 CAPSULE ORAL at 08:11

## 2022-11-22 RX ADMIN — HEPARIN SODIUM 5000 UNITS: 5000 INJECTION INTRAVENOUS; SUBCUTANEOUS at 06:11

## 2022-11-22 NOTE — PLAN OF CARE
Patient denied IPR.  [facility].  [facility] submitted for auth for admit tomorrow.   11/22/22 5292   Post-Acute Status   Post-Acute Authorization Placement   Post-Acute Placement Status Pending payor review/awaiting authorization (if required)

## 2022-11-22 NOTE — PT/OT/SLP PROGRESS
"Occupational Therapy   Treatment    Name: Manisha Wynne  MRN: [id number]  Admitting Diagnosis:  Cervical myelopathy  8 Days Post-Op    Recommendations:     Discharge Recommendations: rehabilitation facility  Discharge Equipment Recommendations:  bedside commode, walker, rolling, bath bench  Barriers to discharge:  Decreased caregiver support, Other (Comment) (inc level of assistance required)    Assessment:     Manisha Wynne is a 72 y.o. female with a medical diagnosis of Cervical myelopathy.  Performance deficits affecting function are weakness, impaired self care skills, impaired balance, decreased coordination, decreased safety awareness, pain, impaired functional mobility, impaired endurance, orthopedic precautions.     Pt presents with increased "brain fog" 2/2 seizure activity last night and R posterior neck pain. Pt correctly recalled 3/3 spinal precautions. Pt completed bed mobility requiring Min A to maintain log rolling technique, CGA w/ RW for functional transfers and mobility, Min A to complete toileting on 3-in-1 commode over toilet in bathroom, and tolerated standing at sink for ~20 minutes to complete grooming tasks as further detailed below. Pt requested to practice donning and doffing Cervical Collar brace independently while standing in front of mirror in bathroom; pt req'd supervision to complete with minimal verbal reminders for proper wear and care of brace upon D/C home. Pt is progressing towards functional goals, however is still functioning well below baseline and lives alone. Continue POC to D/C to Rehab Facility.   Plan:     Patient to be seen 4 x/week to address the above listed problems via self-care/home management, therapeutic activities, therapeutic exercises, neuromuscular re-education  Plan of Care Expires: 12/15/22  Plan of Care Reviewed with: patient    Subjective     Pain/Comfort:  Pain Rating 1: 7/10  Location - Side 1: Right  Location - Orientation 1: " generalized  Location 1: neck (pt reports shooting, stinging pain on R side of neck lateral to incision site)  Pain Addressed 1: Pre-medicate for activity, Reposition, Distraction, Nurse notified  Pain Rating Post-Intervention 1: 7/10    Objective:     Communicated with: nurse prior to session.  Patient found supine with telemetry, peripheral IV upon OT entry to room.    General Precautions: Standard, fall   Orthopedic Precautions:spinal precautions   Braces: Cervical collar  Respiratory Status: Room air    Bed Mobility:    Patient completed Scooting/Bridging with minimum assistance  Patient completed Supine to Sit with minimum assistance  Patient completed Sit to Supine with minimum assistance   Log rolling technique maintained throughout    Functional Mobility/Transfers:  Patient completed Sit <> Stand Transfer with contact guard assistance  with  rolling walker   Patient completed Bed <> Chair Transfer using Step Transfer technique with contact guard assistance with rolling walker  Patient completed Toilet Transfer Step Transfer technique with contact guard assistance with  rolling walker  Functional Mobility: Pt engaged in functional mobility to simulate household/community distances with CGA x 1 w/ RW  in order to maximize functional activity tolerance required for engagement in occupations of choice.  VC's for RW management and safety awareness     Activities of Daily Living:  Grooming: stand by assistance while standing at sink to complete oral care and wash hands  Upper Body Dressing: minimum assistance to angie gown and C Collar while seated EOB  Pt donned Cervical Brace with supervision while standing at sink   Lower Body Dressing: maximal assistance to angie b/l socks while seated EOB  Toileting: minimum assistance to pull up/down underwear while seated on 3-in-1 commode over toilet in bathroom  Min A for pericare wipe 2/2 decreased ability to reach posteriorly w/o strain       AMPAC 6 Click ADL:  20    Treatment & Education:  Pt educated on role of OT, POC, and goals for therapy.    POC was dicussed with patient/caregiver, who was included in its development and is in agreement with the identified goals and treatment plan.   Patient and family aware of patient's deficits and therapy progression.   Time provided for therapeutic counseling and discussion of health disposition.   Educated on importance of EOB/OOB mobility, maintaining routine, sitting up in chair, and maximizing independence with ADLs during admission   Pt completed ADLs and functional mobility for treatment session as noted above   Pt/caregiver verbalized understanding and expressed no further concerns/questions.      Patient left supine with all lines intact, call button in reach, and bed alarm on    GOALS:   Multidisciplinary Problems       Occupational Therapy Goals          Problem: Occupational Therapy    Goal Priority Disciplines Outcome Interventions   Occupational Therapy Goal     OT, PT/OT Ongoing, Progressing    Description: Goals to be met by: 11/29/22     Patient will increase functional independence with ADLs by performing:    UE Dressing with Stand-by Assistance.  LE Dressing with Stand-by Assistance and Assistive Devices as needed.  Grooming while standing at sink with Stand-by Assistance.  Toileting from toilet with Stand-by Assistance for hygiene and clothing management.   Toilet transfer to toilet with Stand-by Assistance.                         Time Tracking:     OT Date of Treatment: 11/22/22  OT Start Time: 1100  OT Stop Time: 1138  OT Total Time (min): 38 min    Billable Minutes:Self Care/Home Management 28  Therapeutic Activity 10    OT/DESTINY: OT     DESTINY Visit Number: 0    11/22/2022

## 2022-11-22 NOTE — SUBJECTIVE & OBJECTIVE
Interval History:  Febrile Tmax 101.1 overnight. Without leukocytosis. VSS. BLE US negative for DVT. UA negative. Reporting right sided neck spasms. Voiding spontaneously. Blood cultures obtained. IS encouraged hourly. Rehab denied. Will pursue SNF vs home with HH.     Medications:  Continuous Infusions:  Scheduled Meds:   acetaminophen  1,000 mg Oral Q8H    albuterol-ipratropium  3 mL Nebulization Q8H    buPROPion  300 mg Oral Daily    dicyclomine  20 mg Oral Q6H    FLUoxetine  40 mg Oral Daily    gabapentin  200 mg Oral TID    heparin (porcine)  5,000 Units Subcutaneous Q8H    lisdexamfetamine  70 mg Oral QAM    losartan  50 mg Oral Daily    methocarbamoL  1,000 mg Oral Q8H    metoprolol tartrate  25 mg Oral BID    polyethylene glycol  17 g Oral Daily    pravastatin  20 mg Oral Daily    senna-docusate 8.6-50 mg  2 tablet Oral Daily     PRN Meds:aluminum-magnesium hydroxide-simethicone, glycerin adult, hydrALAZINE, melatonin, ondansetron, oxyCODONE, oxyCODONE, prochlorperazine     Review of Systems  Objective:     Weight: 71.9 kg (158 lb 8.2 oz)  Body mass index is 24.1 kg/m².  Vital Signs (Most Recent):  Temp: 98.8 °F (37.1 °C) (11/22/22 1241)  Pulse: 66 (11/22/22 1241)  Resp: 19 (11/22/22 1241)  BP: 133/61 (11/22/22 1241)  SpO2: 99 % (11/22/22 1241) Vital Signs (24h Range):  Temp:  [98.4 °F (36.9 °C)-101.1 °F (38.4 °C)] 98.8 °F (37.1 °C)  Pulse:  [64-76] 66  Resp:  [17-19] 19  SpO2:  [90 %-99 %] 99 %  BP: (115-140)/(58-66) 133/61                     Female External Urinary Catheter 11/15/22 0800 (Active)   Skin no redness;no breakdown 11/22/22 0800   Tolerance no signs/symptoms of discomfort 11/22/22 0800   Suction Continuous suction at 60 mmHg 11/21/22 2000   Date of last wick change 11/21/22 11/21/22 2000   Time of last wick change 0800 11/21/22 0800   Output (mL) 500 mL 11/19/22 0400     Neurosurgery Physical Exam  General: well developed, well nourished, no distress.   Head: normocephalic,  atraumatic  Neurologic: Alert and oriented. Thought content appropriate.  Language: No aphasia  Speech: No dysarthria  Cranial nerves: face symmetric, tongue midline, CN II-XII grossly intact.   Eyes: pupils equal, round, reactive to light with accommodation, EOMI.   Pulmonary: normal respirations, no signs of respiratory distress  Skin: Skin is warm, dry and intact.  Sensory: intact to light touch throughout  Motor Strength:Moves all extremities spontaneously with good tone.  No abnormal movements seen.     Strength  Deltoids Triceps Biceps Wrist Extension Wrist Flexion Hand    Upper: R 5/5 5/5 5/5 5/5 5/5 4+/5    L 4+/5 5/5 5/5 5/5 5/5 4+/5     Strength  Iliopsoas Quadriceps Knee  Flexion Tibialis  anterior Gastro- cnemius EHL   Lower: R 5/5 5/5 5/5 5/5 5/5 5/5    L 5/5 5/5 5/5 5/5 5/5 5/5     Incision: Clean, dry, prineo  intact. Skin edges well approximated. No surrounding erythema or edema. No drainage or TTP.        Significant Labs:  Recent Labs   Lab 11/21/22 0413 11/22/22  0529   * 99   * 134*   K 4.3 4.6    106   CO2 22* 20*   BUN 16 14   CREATININE 0.9 0.8   CALCIUM 9.1 9.0     Recent Labs   Lab 11/21/22 0413 11/22/22  0529   WBC 8.62 7.83   HGB 9.7* 9.1*   HCT 30.4* 27.9*    189     No results for input(s): LABPT, INR, APTT in the last 48 hours.  Microbiology Results (last 7 days)       Procedure Component Value Units Date/Time    Blood culture [[id number]] Collected: 11/22/22 0902    Order Status: Sent Specimen: Blood Updated: 11/22/22 1049    Blood culture [[id number]] Collected: 11/22/22 0902    Order Status: Sent Specimen: Blood Updated: 11/22/22 1049          All pertinent labs from the last 24 hours have been reviewed.    Significant Diagnostics:  I have reviewed all pertinent imaging results/findings within the past 24 hours.

## 2022-11-22 NOTE — PLAN OF CARE
Patient with fever overnight. CXR, BLE US, UA performed and resulted WNL. BC pending. Afebrile throughout the day. AAOx4 with increased drowsiness in morning. Patient now resting comfortably in bed eating dinner.     Problem: Adult Inpatient Plan of Care  Goal: Plan of Care Review  Outcome: Ongoing, Progressing  Goal: Patient-Specific Goal (Individualized)  Outcome: Ongoing, Progressing  Goal: Absence of Hospital-Acquired Illness or Injury  Outcome: Ongoing, Progressing  Goal: Optimal Comfort and Wellbeing  Outcome: Ongoing, Progressing  Goal: Readiness for Transition of Care  Outcome: Ongoing, Progressing     Problem: Infection  Goal: Absence of Infection Signs and Symptoms  Outcome: Ongoing, Progressing     Problem: Skin Injury Risk Increased  Goal: Skin Health and Integrity  Outcome: Ongoing, Progressing

## 2022-11-22 NOTE — ASSESSMENT & PLAN NOTE
Manisha Wynne is a 72 y.o. female with cervical myelopathy s/p C3-C6 posterior cervical fusion on 11/14 by Dr. Cheek     - Neuro stable on exam  - Incision with prineo in place. Do not apply anything to incision.   - HV drain discontinued 11/20  - Post op XR satisfactory  - C collar to be worn when upright and OOB  - Pain control- MM pain regimen  - Elevated SBP: Home losartan increased to 50 mg daily. SBP improved   - hyponatremia: 134 today. NS @ 75 ml/hr x 5 hours 11/21. Will give salt tabs.   - Duonebs/CPT q 8 hours for atelectasis prophylaxis   - Completed oral vancomycin while on abx therapy due to history of cdiff.   - DVT prophylaxis: LACHELLE's, SCD's, SQH  - Bowel regimen: senna BID and miralax daily. Last BM 11/18  - Atelectasis prevention: IS hourly while awake, PT/OT, OOB > 6 hours per day  - Febrile 11/22: Tmax 101.1, BLE US negative, UA negative   -F/u blood cultures and CXR     Dispo: IPR denied. Pursuing SNF vs

## 2022-11-22 NOTE — PROGRESS NOTES
[facility] - Neurosurgery ([facility])  Neurosurgery  Progress Note    Subjective:     History of Present Illness: Manisha Wynne is a 72 y.o. female with cervical stenosis and myelopathy. She has an MRI-non-compatible pacemaker in place. This will be due for replacement within the next few months.      Given the results of the myelogram, I expect that she would benefit from C3-C6 laminectomy and fusion, with undercutting of the C2 lamina. We had a lengthy and pleasant conversation about this procedure, including the associated hospitalization.       Post-Op Info:  Procedure(s) (LRB):  LAMINECTOMY, SPINE, CERVICAL, WITH POSTERIOR FUSION C3-C6 (N/A)   8 Days Post-Op     Interval History:  Febrile Tmax 101.1 overnight. Without leukocytosis. VSS. BLE US negative for DVT. UA negative. Reporting right sided neck spasms. Voiding spontaneously. Blood cultures obtained. IS encouraged hourly. Rehab denied. Will pursue SNF vs home with .     Medications:  Continuous Infusions:  Scheduled Meds:   acetaminophen  1,000 mg Oral Q8H    albuterol-ipratropium  3 mL Nebulization Q8H    buPROPion  300 mg Oral Daily    dicyclomine  20 mg Oral Q6H    FLUoxetine  40 mg Oral Daily    gabapentin  200 mg Oral TID    heparin (porcine)  5,000 Units Subcutaneous Q8H    lisdexamfetamine  70 mg Oral QAM    losartan  50 mg Oral Daily    methocarbamoL  1,000 mg Oral Q8H    metoprolol tartrate  25 mg Oral BID    polyethylene glycol  17 g Oral Daily    pravastatin  20 mg Oral Daily    senna-docusate 8.6-50 mg  2 tablet Oral Daily     PRN Meds:aluminum-magnesium hydroxide-simethicone, glycerin adult, hydrALAZINE, melatonin, ondansetron, oxyCODONE, oxyCODONE, prochlorperazine     Review of Systems  Objective:     Weight: 71.9 kg (158 lb 8.2 oz)  Body mass index is 24.1 kg/m².  Vital Signs (Most Recent):  Temp: 98.8 °F (37.1 °C) (11/22/22 1241)  Pulse: 66 (11/22/22 1241)  Resp: 19 (11/22/22 1241)  BP: 133/61 (11/22/22 1241)  SpO2:  99 % (11/22/22 1241) Vital Signs (24h Range):  Temp:  [98.4 °F (36.9 °C)-101.1 °F (38.4 °C)] 98.8 °F (37.1 °C)  Pulse:  [64-76] 66  Resp:  [17-19] 19  SpO2:  [90 %-99 %] 99 %  BP: (115-140)/(58-66) 133/61                     Female External Urinary Catheter 11/15/22 0800 (Active)   Skin no redness;no breakdown 11/22/22 0800   Tolerance no signs/symptoms of discomfort 11/22/22 0800   Suction Continuous suction at 60 mmHg 11/21/22 2000   Date of last wick change 11/21/22 11/21/22 2000   Time of last wick change 0800 11/21/22 0800   Output (mL) 500 mL 11/19/22 0400     Neurosurgery Physical Exam  General: well developed, well nourished, no distress.   Head: normocephalic, atraumatic  Neurologic: Alert and oriented. Thought content appropriate.  Language: No aphasia  Speech: No dysarthria  Cranial nerves: face symmetric, tongue midline, CN II-XII grossly intact.   Eyes: pupils equal, round, reactive to light with accommodation, EOMI.   Pulmonary: normal respirations, no signs of respiratory distress  Skin: Skin is warm, dry and intact.  Sensory: intact to light touch throughout  Motor Strength:Moves all extremities spontaneously with good tone.  No abnormal movements seen.     Strength  Deltoids Triceps Biceps Wrist Extension Wrist Flexion Hand    Upper: R 5/5 5/5 5/5 5/5 5/5 4+/5    L 4+/5 5/5 5/5 5/5 5/5 4+/5     Strength  Iliopsoas Quadriceps Knee  Flexion Tibialis  anterior Gastro- cnemius EHL   Lower: R 5/5 5/5 5/5 5/5 5/5 5/5    L 5/5 5/5 5/5 5/5 5/5 5/5     Incision: Clean, dry, prineo  intact. Skin edges well approximated. No surrounding erythema or edema. No drainage or TTP.        Significant Labs:  Recent Labs   Lab 11/21/22  0413 11/22/22  0529   * 99   * 134*   K 4.3 4.6    106   CO2 22* 20*   BUN 16 14   CREATININE 0.9 0.8   CALCIUM 9.1 9.0     Recent Labs   Lab 11/21/22  0413 11/22/22  0529   WBC 8.62 7.83   HGB 9.7* 9.1*   HCT 30.4* 27.9*    189     No results for input(s):  LABPT, INR, APTT in the last 48 hours.  Microbiology Results (last 7 days)       Procedure Component Value Units Date/Time    Blood culture [[id number]] Collected: 11/22/22 0902    Order Status: Sent Specimen: Blood Updated: 11/22/22 1049    Blood culture [[id number]] Collected: 11/22/22 0902    Order Status: Sent Specimen: Blood Updated: 11/22/22 1049          All pertinent labs from the last 24 hours have been reviewed.    Significant Diagnostics:  I have reviewed all pertinent imaging results/findings within the past 24 hours.    Assessment/Plan:     * Cervical myelopathy  Manisha Wynne is a 72 y.o. female with cervical myelopathy s/p C3-C6 posterior cervical fusion on 11/14 by Dr. Cheek     - Neuro stable on exam  - Incision with prineo in place. Do not apply anything to incision.   - HV drain discontinued 11/20  - Post op XR satisfactory  - C collar to be worn when upright and OOB  - Pain control- MM pain regimen  - Elevated SBP: Home losartan increased to 50 mg daily. SBP improved   - hyponatremia: 134 today. NS @ 75 ml/hr x 5 hours 11/21. Will give salt tabs.   - Duonebs/CPT q 8 hours for atelectasis prophylaxis   - Completed oral vancomycin while on abx therapy due to history of cdiff.   - DVT prophylaxis: LACHELLE's, SCD's, SQH  - Bowel regimen: senna BID and miralax daily. Last BM 11/18  - Atelectasis prevention: IS hourly while awake, PT/OT, OOB > 6 hours per day  - Febrile 11/22: Tmax 101.1, BLE US negative, UA negative   -F/u blood cultures and CXR     Dispo: IPR denied. Pursuing SNF vs           Brooklynn Carlos PA-C  Neurosurgery  [facility] - Neurosurgery ([facility])

## 2022-11-23 ENCOUNTER — HOSPITAL ENCOUNTER (INPATIENT)
Facility: HOSPITAL | Age: 72
LOS: 21 days | Discharge: HOME HEALTH | DRG: 949 | End: 2022-12-14
Attending: HOSPITALIST | Admitting: HOSPITALIST
Payer: MEDICARE

## 2022-11-23 VITALS
TEMPERATURE: 98 F | SYSTOLIC BLOOD PRESSURE: 111 MMHG | HEIGHT: 68 IN | WEIGHT: 158.5 LBS | HEART RATE: 70 BPM | BODY MASS INDEX: 24.02 KG/M2 | DIASTOLIC BLOOD PRESSURE: 66 MMHG | RESPIRATION RATE: 19 BRPM | OXYGEN SATURATION: 100 %

## 2022-11-23 DIAGNOSIS — M53.3 SACROILIAC JOINT DYSFUNCTION: Primary | ICD-10-CM

## 2022-11-23 DIAGNOSIS — M54.16 LUMBAR RADICULOPATHY: ICD-10-CM

## 2022-11-23 DIAGNOSIS — G95.9 CERVICAL MYELOPATHY: ICD-10-CM

## 2022-11-23 DIAGNOSIS — M48.061 SPINAL STENOSIS OF LUMBAR REGION WITHOUT NEUROGENIC CLAUDICATION: ICD-10-CM

## 2022-11-23 LAB
ANION GAP SERPL CALC-SCNC: 7 MMOL/L (ref 8–16)
BASOPHILS # BLD AUTO: 0.04 K/UL (ref 0–0.2)
BASOPHILS NFR BLD: 0.6 % (ref 0–1.9)
BUN SERPL-MCNC: 14 MG/DL (ref 8–23)
CALCIUM SERPL-MCNC: 8.9 MG/DL (ref 8.7–10.5)
CHLORIDE SERPL-SCNC: 104 MMOL/L (ref 95–110)
CO2 SERPL-SCNC: 23 MMOL/L (ref 23–29)
CREAT SERPL-MCNC: 0.9 MG/DL (ref 0.5–1.4)
DIFFERENTIAL METHOD: ABNORMAL
EOSINOPHIL # BLD AUTO: 0.3 K/UL (ref 0–0.5)
EOSINOPHIL NFR BLD: 4.3 % (ref 0–8)
ERYTHROCYTE [DISTWIDTH] IN BLOOD BY AUTOMATED COUNT: 14.3 % (ref 11.5–14.5)
EST. GFR  (NO RACE VARIABLE): >60 ML/MIN/1.73 M^2
GLUCOSE SERPL-MCNC: 97 MG/DL (ref 70–110)
HCT VFR BLD AUTO: 30 % (ref 37–48.5)
HGB BLD-MCNC: 9.3 G/DL (ref 12–16)
IMM GRANULOCYTES # BLD AUTO: 0.08 K/UL (ref 0–0.04)
IMM GRANULOCYTES NFR BLD AUTO: 1.1 % (ref 0–0.5)
LYMPHOCYTES # BLD AUTO: 1.9 K/UL (ref 1–4.8)
LYMPHOCYTES NFR BLD: 26.3 % (ref 18–48)
MCH RBC QN AUTO: 30.3 PG (ref 27–31)
MCHC RBC AUTO-ENTMCNC: 31 G/DL (ref 32–36)
MCV RBC AUTO: 98 FL (ref 82–98)
MONOCYTES # BLD AUTO: 1.1 K/UL (ref 0.3–1)
MONOCYTES NFR BLD: 15.4 % (ref 4–15)
NEUTROPHILS # BLD AUTO: 3.7 K/UL (ref 1.8–7.7)
NEUTROPHILS NFR BLD: 52.3 % (ref 38–73)
NRBC BLD-RTO: 0 /100 WBC
PLATELET # BLD AUTO: 196 K/UL (ref 150–450)
PMV BLD AUTO: 10 FL (ref 9.2–12.9)
POTASSIUM SERPL-SCNC: 4.5 MMOL/L (ref 3.5–5.1)
POTASSIUM SERPL-SCNC: 5.4 MMOL/L (ref 3.5–5.1)
RBC # BLD AUTO: 3.07 M/UL (ref 4–5.4)
SODIUM SERPL-SCNC: 134 MMOL/L (ref 136–145)
WBC # BLD AUTO: 7.03 K/UL (ref 3.9–12.7)

## 2022-11-23 PROCEDURE — 97530 THERAPEUTIC ACTIVITIES: CPT | Mod: CQ

## 2022-11-23 PROCEDURE — 1111F PR DISCHARGE MEDS RECONCILED W/ CURRENT OUTPATIENT MED LIST: ICD-10-PCS | Mod: CPTII,,, | Performed by: PHYSICIAN ASSISTANT

## 2022-11-23 PROCEDURE — 94761 N-INVAS EAR/PLS OXIMETRY MLT: CPT

## 2022-11-23 PROCEDURE — 99024 POSTOP FOLLOW-UP VISIT: CPT | Mod: ,,, | Performed by: PHYSICIAN ASSISTANT

## 2022-11-23 PROCEDURE — 85025 COMPLETE CBC W/AUTO DIFF WBC: CPT | Performed by: STUDENT IN AN ORGANIZED HEALTH CARE EDUCATION/TRAINING PROGRAM

## 2022-11-23 PROCEDURE — 36415 COLL VENOUS BLD VENIPUNCTURE: CPT | Performed by: STUDENT IN AN ORGANIZED HEALTH CARE EDUCATION/TRAINING PROGRAM

## 2022-11-23 PROCEDURE — 63600175 PHARM REV CODE 636 W HCPCS: Performed by: STUDENT IN AN ORGANIZED HEALTH CARE EDUCATION/TRAINING PROGRAM

## 2022-11-23 PROCEDURE — 99024 PR POST-OP FOLLOW-UP VISIT: ICD-10-PCS | Mod: ,,, | Performed by: PHYSICIAN ASSISTANT

## 2022-11-23 PROCEDURE — 36415 COLL VENOUS BLD VENIPUNCTURE: CPT | Performed by: PHYSICIAN ASSISTANT

## 2022-11-23 PROCEDURE — 94640 AIRWAY INHALATION TREATMENT: CPT

## 2022-11-23 PROCEDURE — 25000242 PHARM REV CODE 250 ALT 637 W/ HCPCS: Performed by: PHYSICIAN ASSISTANT

## 2022-11-23 PROCEDURE — 25000003 PHARM REV CODE 250: Performed by: PHYSICIAN ASSISTANT

## 2022-11-23 PROCEDURE — 1111F DSCHRG MED/CURRENT MED MERGE: CPT | Mod: CPTII,,, | Performed by: PHYSICIAN ASSISTANT

## 2022-11-23 PROCEDURE — 25000003 PHARM REV CODE 250: Performed by: HOSPITALIST

## 2022-11-23 PROCEDURE — 11000004 HC SNF PRIVATE

## 2022-11-23 PROCEDURE — 84132 ASSAY OF SERUM POTASSIUM: CPT | Performed by: PHYSICIAN ASSISTANT

## 2022-11-23 PROCEDURE — 97116 GAIT TRAINING THERAPY: CPT | Mod: CQ

## 2022-11-23 PROCEDURE — 94668 MNPJ CHEST WALL SBSQ: CPT

## 2022-11-23 PROCEDURE — 25000003 PHARM REV CODE 250: Performed by: STUDENT IN AN ORGANIZED HEALTH CARE EDUCATION/TRAINING PROGRAM

## 2022-11-23 PROCEDURE — 80048 BASIC METABOLIC PNL TOTAL CA: CPT | Performed by: STUDENT IN AN ORGANIZED HEALTH CARE EDUCATION/TRAINING PROGRAM

## 2022-11-23 PROCEDURE — 97110 THERAPEUTIC EXERCISES: CPT | Mod: CQ

## 2022-11-23 RX ORDER — BUPROPION HYDROCHLORIDE 150 MG/1
300 TABLET ORAL DAILY
Status: DISCONTINUED | OUTPATIENT
Start: 2022-11-24 | End: 2022-12-14 | Stop reason: HOSPADM

## 2022-11-23 RX ORDER — AMOXICILLIN 250 MG
1 CAPSULE ORAL 2 TIMES DAILY
Status: DISCONTINUED | OUTPATIENT
Start: 2022-11-23 | End: 2022-12-01

## 2022-11-23 RX ORDER — TALC
6 POWDER (GRAM) TOPICAL NIGHTLY PRN
Status: DISCONTINUED | OUTPATIENT
Start: 2022-11-23 | End: 2022-12-14 | Stop reason: HOSPADM

## 2022-11-23 RX ORDER — ACETAMINOPHEN 500 MG
1000 TABLET ORAL EVERY 8 HOURS
Status: DISCONTINUED | OUTPATIENT
Start: 2022-11-23 | End: 2022-12-14 | Stop reason: HOSPADM

## 2022-11-23 RX ORDER — GABAPENTIN 100 MG/1
100 CAPSULE ORAL 3 TIMES DAILY
Status: DISCONTINUED | OUTPATIENT
Start: 2022-11-23 | End: 2022-12-14 | Stop reason: HOSPADM

## 2022-11-23 RX ORDER — PRAVASTATIN SODIUM 20 MG/1
20 TABLET ORAL DAILY
Status: DISCONTINUED | OUTPATIENT
Start: 2022-11-24 | End: 2022-12-01

## 2022-11-23 RX ORDER — CALCIUM CARBONATE 200(500)MG
500 TABLET,CHEWABLE ORAL 2 TIMES DAILY PRN
Status: DISCONTINUED | OUTPATIENT
Start: 2022-11-23 | End: 2022-12-14 | Stop reason: HOSPADM

## 2022-11-23 RX ORDER — ACETAMINOPHEN 325 MG/1
650 TABLET ORAL EVERY 6 HOURS PRN
Status: DISCONTINUED | OUTPATIENT
Start: 2022-11-23 | End: 2022-12-14 | Stop reason: HOSPADM

## 2022-11-23 RX ORDER — DICYCLOMINE HYDROCHLORIDE 20 MG/1
20 TABLET ORAL EVERY 6 HOURS PRN
Status: DISCONTINUED | OUTPATIENT
Start: 2022-11-23 | End: 2022-12-14 | Stop reason: HOSPADM

## 2022-11-23 RX ORDER — LOSARTAN POTASSIUM 25 MG/1
25 TABLET ORAL DAILY
Status: DISCONTINUED | OUTPATIENT
Start: 2022-11-24 | End: 2022-12-14 | Stop reason: HOSPADM

## 2022-11-23 RX ORDER — METHOCARBAMOL 500 MG/1
1000 TABLET, FILM COATED ORAL EVERY 8 HOURS
Status: COMPLETED | OUTPATIENT
Start: 2022-11-23 | End: 2022-12-03

## 2022-11-23 RX ORDER — OXYCODONE HYDROCHLORIDE 10 MG/1
10 TABLET ORAL EVERY 4 HOURS PRN
Status: DISCONTINUED | OUTPATIENT
Start: 2022-11-23 | End: 2022-12-01

## 2022-11-23 RX ORDER — ACETAMINOPHEN 500 MG
5000 TABLET ORAL DAILY
Status: DISCONTINUED | OUTPATIENT
Start: 2022-11-24 | End: 2022-12-01

## 2022-11-23 RX ORDER — FERROUS SULFATE, DRIED 160(50) MG
1 TABLET, EXTENDED RELEASE ORAL DAILY
Status: DISCONTINUED | OUTPATIENT
Start: 2022-11-24 | End: 2022-12-01

## 2022-11-23 RX ORDER — FLUOXETINE 10 MG/1
40 CAPSULE ORAL DAILY
Status: DISCONTINUED | OUTPATIENT
Start: 2022-11-24 | End: 2022-12-14 | Stop reason: HOSPADM

## 2022-11-23 RX ORDER — METOPROLOL TARTRATE 25 MG/1
25 TABLET, FILM COATED ORAL 2 TIMES DAILY
Status: DISCONTINUED | OUTPATIENT
Start: 2022-11-23 | End: 2022-12-14 | Stop reason: HOSPADM

## 2022-11-23 RX ADMIN — DICYCLOMINE HYDROCHLORIDE 20 MG: 20 TABLET ORAL at 01:11

## 2022-11-23 RX ADMIN — IPRATROPIUM BROMIDE AND ALBUTEROL SULFATE 3 ML: 2.5; .5 SOLUTION RESPIRATORY (INHALATION) at 08:11

## 2022-11-23 RX ADMIN — Medication 6 MG: at 08:11

## 2022-11-23 RX ADMIN — Medication 70 MG: at 08:11

## 2022-11-23 RX ADMIN — METOPROLOL TARTRATE 25 MG: 25 TABLET, FILM COATED ORAL at 09:11

## 2022-11-23 RX ADMIN — METOPROLOL TARTRATE 25 MG: 25 TABLET, FILM COATED ORAL at 08:11

## 2022-11-23 RX ADMIN — GABAPENTIN 100 MG: 100 CAPSULE ORAL at 08:11

## 2022-11-23 RX ADMIN — METHOCARBAMOL 1000 MG: 500 TABLET ORAL at 05:11

## 2022-11-23 RX ADMIN — HEPARIN SODIUM 5000 UNITS: 5000 INJECTION INTRAVENOUS; SUBCUTANEOUS at 01:11

## 2022-11-23 RX ADMIN — ACETAMINOPHEN 1000 MG: 500 TABLET ORAL at 10:11

## 2022-11-23 RX ADMIN — ACETAMINOPHEN 1000 MG: 325 TABLET ORAL at 05:11

## 2022-11-23 RX ADMIN — METHOCARBAMOL 1000 MG: 500 TABLET ORAL at 10:11

## 2022-11-23 RX ADMIN — LOSARTAN POTASSIUM 50 MG: 50 TABLET, FILM COATED ORAL at 09:11

## 2022-11-23 RX ADMIN — IPRATROPIUM BROMIDE AND ALBUTEROL SULFATE 3 ML: 2.5; .5 SOLUTION RESPIRATORY (INHALATION) at 01:11

## 2022-11-23 RX ADMIN — GABAPENTIN 200 MG: 100 CAPSULE ORAL at 09:11

## 2022-11-23 RX ADMIN — IPRATROPIUM BROMIDE AND ALBUTEROL SULFATE 3 ML: 2.5; .5 SOLUTION RESPIRATORY (INHALATION) at 03:11

## 2022-11-23 RX ADMIN — HEPARIN SODIUM 5000 UNITS: 5000 INJECTION INTRAVENOUS; SUBCUTANEOUS at 05:11

## 2022-11-23 RX ADMIN — ACETAMINOPHEN 1000 MG: 325 TABLET ORAL at 01:11

## 2022-11-23 RX ADMIN — BUPROPION HYDROCHLORIDE 300 MG: 300 TABLET, FILM COATED, EXTENDED RELEASE ORAL at 09:11

## 2022-11-23 RX ADMIN — FLUOXETINE 40 MG: 20 CAPSULE ORAL at 09:11

## 2022-11-23 RX ADMIN — METHOCARBAMOL 1000 MG: 500 TABLET ORAL at 01:11

## 2022-11-23 RX ADMIN — OXYCODONE HYDROCHLORIDE 10 MG: 10 TABLET ORAL at 08:11

## 2022-11-23 NOTE — PLAN OF CARE
Problem: Adult Inpatient Plan of Care  Goal: Plan of Care Review  Outcome: Ongoing, Progressing     Patient is AAO x4. POC reviewed with patient. Patient verbalized understanding. Patient's breathing is unlabored with equal chest expansion. Patient has an incision to posterior neck with dermabond in place. Patient did not receive any PRN pain meds during shift. Patient voids per purewick. Patient remained free from falls. Patient rested well through shift. Bed in lowest position,bed alarm on, side rails up x3, no complaints or signs of distress. WCTM during shift.  See flowsheets for full assessment and VS info.

## 2022-11-23 NOTE — PT/OT/SLP PROGRESS
Physical Therapy Treatment    Patient Name:  Manisha Wynne   MRN:  [id number]    Recommendations:     Discharge Recommendations:  rehabilitation facility   Discharge Equipment Recommendations:  (TBD, pending progress)   Barriers to discharge: Inaccessible home and Decreased caregiver support    Assessment:     Manisha Wynne is a 72 y.o. female admitted with a medical diagnosis of Cervical myelopathy.  She presents with the following impairments/functional limitations:  weakness, impaired endurance, impaired self care skills, gait instability, impaired functional mobility, impaired balance, decreased safety awareness. Pt participated, and tolerated treatment well. Pt will continue to benefit from skilled PT services to improve all deficits noted above. Resume PT POC as indicated.     Rehab Prognosis: Good; patient would benefit from acute skilled PT services to address these deficits and reach maximum level of function.    Recent Surgery: Procedure(s) (LRB):  LAMINECTOMY, SPINE, CERVICAL, WITH POSTERIOR FUSION C3-C6 (N/A) 9 Days Post-Op    Plan:     During this hospitalization, patient to be seen 4 x/week to address the identified rehab impairments via gait training, therapeutic activities, therapeutic exercises, neuromuscular re-education and progress toward the following goals:    Plan of Care Expires:  12/14/22    Subjective     Chief Complaint: none stated  Patient/Family Comments/goals: none stated  Pain/Comfort:  Pain Rating 1: 0/10  Pain Rating Post-Intervention 1: 0/10      Objective:     Communicated with nursing  prior to session.  Patient found up in chair with  (all lines intact and cervical collar donned) upon PT entry to room.     General Precautions: Standard, fall   Orthopedic Precautions:spinal precautions   Braces: Cervical collar  Respiratory Status: Room air     Functional Mobility:  Transfers:  Sit to Stand: to/from bedside chair x3 trials contact guard assistance with rolling  walker  Gait: ~50ft with RW and CGA for safety.       AM-PAC 6 CLICK MOBILITY  Turning over in bed (including adjusting bedclothes, sheets and blankets)?: 4  Sitting down on and standing up from a chair with arms (e.g., wheelchair, bedside commode, etc.): 3  Moving from lying on back to sitting on the side of the bed?: 3  Moving to and from a bed to a chair (including a wheelchair)?: 3  Need to walk in hospital room?: 3  Climbing 3-5 steps with a railing?: 2  Basic Mobility Total Score: 18       Treatment & Education:  -BLE therex x10 reps: AP,LAQ,HF,Hip abd/add  -Answered all questions/concerns within PTA scope of practice.   -Reviewed/discussed spinal precautions.    Patient left up in chair with all lines intact, cervical collar donned, call button in reach, and nursing notified..    GOALS:   Multidisciplinary Problems       Physical Therapy Goals          Problem: Physical Therapy    Goal Priority Disciplines Outcome Goal Variances Interventions   Physical Therapy Goal     PT, PT/OT Ongoing, Progressing     Description: Goals to be met by: 22     Patient will increase functional independence with mobility by performin. Supine to sit with [place]  2. Sit to supine with [place]  3. Sit to stand transfer with Supervision  4. Bed to chair transfer with Supervision using Rolling Walker  5. Gait  x 50 feet with Stand-by Assistance using Rolling Walker.   6. Ascend/descend 10 stair with right Handrails [place] using No Assistive Device.                          Time Tracking:     PT Received On: 22  PT Start Time: 956     PT Stop Time: 1019  PT Total Time (min): 23 min     Billable Minutes: Gait Training 8, Therapeutic Activity 15    Treatment Type: Treatment  PT/PTA: PTA     PTA Visit Number: 2     2022

## 2022-11-23 NOTE — DISCHARGE SUMMARY
Pet[facility] Villalobos - Neurosurgery ([facility])  Neurosurgery  Discharge Summary      Patient Name: Manisha Wynne  MRN: [id number]  Admission Date: 11/14/2022  Hospital Length of Stay: 9 days  Discharge Date and Time: 11/23/22  Attending Physician: Nicolette Cheek MD   Discharging Provider: Lyric Quezada PA-C  Primary Care Provider: Iris Blue MD    HPI:   Manisha Wynne is a 72 y.o. female with cervical stenosis and myelopathy. She has an MRI-non-compatible pacemaker in place. This will be due for replacement within the next few months.      Given the results of the myelogram, I expect that she would benefit from C3-C6 laminectomy and fusion, with undercutting of the C2 lamina. We had a lengthy and pleasant conversation about this procedure, including the associated hospitalization.       Procedure(s) (LRB):  LAMINECTOMY, SPINE, CERVICAL, WITH POSTERIOR FUSION C3-C6 (N/A)     Hospital Course: 11/15: NAEON. Pain well controlled this AM. Working on getting [place] collar to work with therapy. Drain with 110 cc in place. Started on vanc for c diff prophylaxis 2/2 history of c diff infection. PT/OT/OOB. Gamble removed, will CTM voiding.   11/16: NAEON. Increased pain today, pain medications adjusted. Working well with PT/OT. Drain with 130 cc output. Voiding spontaneously.   11/17: NAEON. Continues to complain of pain, relieved with po medication. Increased gabapentin to 200 tid. Spoke with patient about utilizing pain medication. SBP labile with spikes to 190. Will continue to monitor, if pain controlled and BP still elevated will consider increasing losartan. Voiding spontaneously. No BM, passing flatus.   11/18: NAEON. SBP spikes continue in AM, losartan increased 50 mg daily. Pain this AM, improved with medication. Neuro exam stable. No BM yet, plan for suppository this PM if unable to go. Pending IPR  11/19: NAEON. Patient neuro stable. No complaints this morning. HV drain to gravity, 100 cc SS  output, will keep today. CBC and BMP pending. Patient had friend bring home vyvanse, okay to resume. Had BM yesterday. Pending IPR, likely Monday.   11/20: NAEON. AFVSS. Exam stable. HV in place; d/c today. Pending IPR, likely tomorrow.   11/21: NAEON. Exam stable. Voiding spontaneously, BM post op. Na Pending IPR, medically stable. Na 134, started on NS @ 75 ml/hr x 5 hours. CTM  11/22: Febrile Tmax 101.1 overnight. Without leukocytosis. VSS. BLE US negative for DVT. UA negative. Reporting right sided neck spasms. Voiding spontaneously. Blood cultures obtained. IS encouraged hourly. Rehab denied. Will pursue SNF vs home with HH.   11/23: NAEON. Neuro exam stable. Tmax 100.9 overnight, fever work up negative. Stable for dc to SNF. Dc instructions discussed with patient. She voiced understanding. All of her questions were answered    Exam day of discharge  General: well developed, well nourished, no distress.   Head: normocephalic, atraumatic  Neurologic: Alert and oriented. Thought content appropriate.  Language: No aphasia  Speech: No dysarthria  Cranial nerves: face symmetric, tongue midline, CN II-XII grossly intact.   Eyes: pupils equal, round, reactive to light with accommodation, EOMI.   Pulmonary: normal respirations, no signs of respiratory distress  Skin: Skin is warm, dry and intact.  Sensory: intact to light touch throughout  Motor Strength:Moves all extremities spontaneously with good tone.  Full strength upper and lower extremities. No abnormal movements seen.   Incision: Clean, dry, prineo  intact. Skin edges well approximated. No surrounding erythema or edema. No drainage or TTP.      Goals of Care Treatment Preferences:  Code Status: Full Code    Living Will: Yes              Consults: PT/OT  Consults (From admission, onward)        Status Ordering Provider     Inpatient consult to Physical Medicine Rehab  Once        Provider:  (Not yet assigned)    Completed DELFIN GARRIDO consult to case  management  Once        Provider:  (Not yet assigned)    Acknowledged NICOLETTE GARRIDO          Significant Diagnostic Studies: XR cervical     Pending Diagnostic Studies:     None        Final Active Diagnoses:    Diagnosis Date Noted POA    PRINCIPAL PROBLEM:  Cervical myelopathy [G95.9] 05/06/2021 Yes      Problems Resolved During this Admission:      Discharged Condition: good     Disposition: Home or Self Care    Follow Up:   Future Appointments   Date Time Provider Department Center   12/22/2022 10:00 AM [facility]OM [facility]IC-XRAY [facility]   12/22/2022 11:00 AM Nicolette Garrido MD [facility] NEUROS8 [facility]   1/5/2023 10:00 AM HOME MONITOR DEVICE CHECK, [facility]PRO [facility]   1/10/2023  8:45 AM EKG, APPT [facility] EKG [facility]   1/10/2023  9:00 AM COORDINATED DEVICE CHECK [facility]   1/10/2023  9:30 AM Svetlana Miner NP [facility] ARRHYTH [facility]   2/2/2023 11:30 AM Sunitha Rosario PA-C [facility] NEURO8 [facility]   2/10/2023 10:00 AM LAB, APPOINTMENT [facility] INTMED [facility] LAB IM [facility]   2/23/2023  9:15 AM INJECTION [facility] AMB INF [facility]         Patient Instructions:      Notify your health care provider if you experience any of the following:  temperature >100.4     Notify your health care provider if you experience any of the following:  persistent nausea and vomiting or diarrhea     Notify your health care provider if you experience any of the following:  severe uncontrolled pain     Notify your health care provider if you experience any of the following:  redness, tenderness, or signs of infection (pain, swelling, redness, odor or green/yellow discharge around incision site)     Notify your health care provider if you experience any of the following:  difficulty breathing or increased cough     Notify your health care provider if you experience any of the following:  severe persistent headache     Notify your health care provider if you experience any of the following:  worsening rash     Notify  your health care provider if you experience any of the following:  persistent dizziness, light-headedness, or visual disturbances     Notify your health care provider if you experience any of the following:  increased confusion or weakness     Activity as tolerated     Medications:  Reconciled Home Medications:      Medication List      START taking these medications    acetaminophen 500 MG tablet  Commonly known as: TYLENOL  Take 2 tablets (1,000 mg total) by mouth every 8 (eight) hours.     methocarbamoL 1,000 mg Tab  Take 1,000 mg by mouth every 8 (eight) hours. for 10 days     oxyCODONE 10 mg Tab immediate release tablet  Commonly known as: ROXICODONE  Take 1 tablet (10 mg total) by mouth every 4 (four) hours as needed for Pain.        CONTINUE taking these medications    buPROPion 300 MG 24 hr tablet  Commonly known as: WELLBUTRIN XL  Take 300 mg by mouth once daily.     calcium-vitamin D3 600 mg-5 mcg (200 unit) Tab  Commonly known as: CALCIUM 600 + D(3)  Take 1 tablet by mouth once daily.     cholecalciferol (vitamin D3) 125 mcg (5,000 unit) Tab  Take 5,000 Units by mouth once daily.     dicyclomine 20 mg tablet  Commonly known as: BENTYL  Take 20 mg by mouth every 6 (six) hours. Takes prn     FLUoxetine 40 MG capsule  Take 40 mg by mouth once daily.     gabapentin 100 MG capsule  Commonly known as: NEURONTIN  Take 100 mg by mouth 3 (three) times daily.     losartan 25 MG tablet  Commonly known as: COZAAR  TAKE 1 TABLET EVERY DAY     metoprolol tartrate 25 MG tablet  Commonly known as: LOPRESSOR  TAKE 1 TABLET TWICE DAILY     pravastatin 20 MG tablet  Commonly known as: PRAVACHOL  TAKE 1 TABLET EVERY DAY        STOP taking these medications    aspirin 81 MG EC tablet  Commonly known as: ECOTRIN     estradioL 0.01 % (0.1 mg/gram) vaginal cream  Commonly known as: ESTRACE     lisdexamfetamine 70 MG capsule  Commonly known as: VYVANSE     PROLIA 60 mg/mL Syrg  Generic drug: denosumab            Lyric SIDDIQUI  JACE Quezada  Neurosurgery  [facility] - Neurosurgery ([facility])

## 2022-11-23 NOTE — PLAN OF CARE
[facility] - Neurosurgery (Hospital)  Discharge Final Note    Primary Care Provider: Iris Blue MD    Expected Discharge Date: 11/23/2022    Patient to be discharged to O SNF.  Care deferred to [facility] SNF.  PFC to provide wheelchair transportation.  Neurosurgery clinic to schedule follow up appointment.    Nurse to call report to [phone].  Wheelchair requested for 5:00 which is not a guaranteed arrival time.    Future Appointments   Date Time Provider Department Center   12/22/2022 10:00 AM [facility]   12/22/2022 11:00 AM Nicolette Cheek MD [facility] NEUROS8 [facility]   1/5/2023 10:00 AM HOME MONITOR DEVICE CHECK, [facility] AR[facility]PRO [facility]   1/10/2023  8:45 AM EKG, APPT [facility] EKG [facility]   1/10/2023  9:00 AM COORDINATED DEVICE CHECK [facility] ARRHPRO [facility]   1/10/2023  9:30 AM Svetlana Miner NP [facility] ARRHYTH [facility]   2/2/2023 11:30 AM Sunitha Rosario PA-C [facility] NEURO8 [facility]   2/10/2023 10:00 AM LAB, APPOINTMENT [facility] INTMED [facility] LAB IM [facility] PC[facility]   2/23/2023  9:15 AM INJECTION [facility] AMB INF [facility]        Final Discharge Note (most recent)       Final Note - 11/23/22 1530          Final Note    Assessment Type Final Discharge Note     Anticipated Discharge Disposition Skilled Nursing Facility        Post-Acute Status    Post-Acute Authorization Placement     Post-Acute Placement Status Set-up Complete/Auth obtained     Discharge Delays None known at this time                     Important Message from Medicare  Important Message from Medicare regarding Discharge Appeal Rights: Given to patient/caregiver, Explained to patient/caregiver, Signed/date by patient/caregiver     Date IMM was signed: 11/23/22  Time IMM was signed: [id number]

## 2022-11-23 NOTE — PLAN OF CARE
SSC met with patient/family at bedside. Patient experience rounding completed and reviewed the following.     Do you know your discharge plan? Yes    If yes, what is the plan? SNF    If you are discharging home, do you have help at home? No    Do you think you will need help at home at discharge?  No     Have you discussed your needs and preferences with your SW/CM? Yes     Assigned SW/CM notified of any patient/family needs or concerns.

## 2022-11-24 LAB
ANION GAP SERPL CALC-SCNC: 9 MMOL/L (ref 8–16)
BASOPHILS # BLD AUTO: 0.04 K/UL (ref 0–0.2)
BASOPHILS NFR BLD: 0.6 % (ref 0–1.9)
BUN SERPL-MCNC: 11 MG/DL (ref 8–23)
CALCIUM SERPL-MCNC: 9.1 MG/DL (ref 8.7–10.5)
CHLORIDE SERPL-SCNC: 106 MMOL/L (ref 95–110)
CO2 SERPL-SCNC: 24 MMOL/L (ref 23–29)
CREAT SERPL-MCNC: 0.8 MG/DL (ref 0.5–1.4)
DIFFERENTIAL METHOD: ABNORMAL
EOSINOPHIL # BLD AUTO: 0.3 K/UL (ref 0–0.5)
EOSINOPHIL NFR BLD: 4.2 % (ref 0–8)
ERYTHROCYTE [DISTWIDTH] IN BLOOD BY AUTOMATED COUNT: 14.4 % (ref 11.5–14.5)
EST. GFR  (NO RACE VARIABLE): >60 ML/MIN/1.73 M^2
GLUCOSE SERPL-MCNC: 98 MG/DL (ref 70–110)
HCT VFR BLD AUTO: 29.5 % (ref 37–48.5)
HGB BLD-MCNC: 9.6 G/DL (ref 12–16)
IMM GRANULOCYTES # BLD AUTO: 0.07 K/UL (ref 0–0.04)
IMM GRANULOCYTES NFR BLD AUTO: 1.1 % (ref 0–0.5)
LYMPHOCYTES # BLD AUTO: 1.3 K/UL (ref 1–4.8)
LYMPHOCYTES NFR BLD: 19.5 % (ref 18–48)
MAGNESIUM SERPL-MCNC: 2.2 MG/DL (ref 1.6–2.6)
MCH RBC QN AUTO: 30.9 PG (ref 27–31)
MCHC RBC AUTO-ENTMCNC: 32.5 G/DL (ref 32–36)
MCV RBC AUTO: 95 FL (ref 82–98)
MONOCYTES # BLD AUTO: 0.9 K/UL (ref 0.3–1)
MONOCYTES NFR BLD: 13.9 % (ref 4–15)
NEUTROPHILS # BLD AUTO: 3.9 K/UL (ref 1.8–7.7)
NEUTROPHILS NFR BLD: 60.7 % (ref 38–73)
NRBC BLD-RTO: 0 /100 WBC
PHOSPHATE SERPL-MCNC: 2.8 MG/DL (ref 2.7–4.5)
PLATELET # BLD AUTO: 226 K/UL (ref 150–450)
PMV BLD AUTO: 9.8 FL (ref 9.2–12.9)
POTASSIUM SERPL-SCNC: 5.1 MMOL/L (ref 3.5–5.1)
RBC # BLD AUTO: 3.11 M/UL (ref 4–5.4)
SODIUM SERPL-SCNC: 139 MMOL/L (ref 136–145)
WBC # BLD AUTO: 6.42 K/UL (ref 3.9–12.7)

## 2022-11-24 PROCEDURE — 84100 ASSAY OF PHOSPHORUS: CPT | Performed by: HOSPITALIST

## 2022-11-24 PROCEDURE — 25000003 PHARM REV CODE 250: Performed by: HOSPITALIST

## 2022-11-24 PROCEDURE — 85025 COMPLETE CBC W/AUTO DIFF WBC: CPT | Performed by: HOSPITALIST

## 2022-11-24 PROCEDURE — 80048 BASIC METABOLIC PNL TOTAL CA: CPT | Performed by: HOSPITALIST

## 2022-11-24 PROCEDURE — 83735 ASSAY OF MAGNESIUM: CPT | Performed by: HOSPITALIST

## 2022-11-24 PROCEDURE — 11000004 HC SNF PRIVATE

## 2022-11-24 RX ADMIN — METHOCARBAMOL 1000 MG: 500 TABLET ORAL at 06:11

## 2022-11-24 RX ADMIN — GABAPENTIN 100 MG: 100 CAPSULE ORAL at 03:11

## 2022-11-24 RX ADMIN — METHOCARBAMOL 1000 MG: 500 TABLET ORAL at 10:11

## 2022-11-24 RX ADMIN — BUPROPION HYDROCHLORIDE 300 MG: 300 TABLET, FILM COATED, EXTENDED RELEASE ORAL at 09:11

## 2022-11-24 RX ADMIN — FLUOXETINE 40 MG: 10 CAPSULE ORAL at 09:11

## 2022-11-24 RX ADMIN — GABAPENTIN 100 MG: 100 CAPSULE ORAL at 08:11

## 2022-11-24 RX ADMIN — GABAPENTIN 100 MG: 100 CAPSULE ORAL at 09:11

## 2022-11-24 RX ADMIN — PRAVASTATIN SODIUM 20 MG: 20 TABLET ORAL at 09:11

## 2022-11-24 RX ADMIN — Medication 1 TABLET: at 09:11

## 2022-11-24 RX ADMIN — ACETAMINOPHEN 1000 MG: 500 TABLET ORAL at 06:11

## 2022-11-24 RX ADMIN — METOPROLOL TARTRATE 25 MG: 25 TABLET, FILM COATED ORAL at 09:11

## 2022-11-24 RX ADMIN — CHOLECALCIFEROL TAB 125 MCG (5000 UNIT) 5000 UNITS: 125 TAB at 09:11

## 2022-11-24 RX ADMIN — METHOCARBAMOL 1000 MG: 500 TABLET ORAL at 03:11

## 2022-11-24 RX ADMIN — LOSARTAN POTASSIUM 25 MG: 25 TABLET, FILM COATED ORAL at 09:11

## 2022-11-24 RX ADMIN — ACETAMINOPHEN 1000 MG: 500 TABLET ORAL at 10:11

## 2022-11-24 RX ADMIN — METOPROLOL TARTRATE 25 MG: 25 TABLET, FILM COATED ORAL at 08:11

## 2022-11-24 RX ADMIN — ACETAMINOPHEN 1000 MG: 500 TABLET ORAL at 03:11

## 2022-11-24 NOTE — PLAN OF CARE
Problem: Adult Inpatient Plan of Care  Goal: Optimal Comfort and Wellbeing  Outcome: Ongoing, Progressing     Problem: Adult Inpatient Plan of Care  Goal: Readiness for Transition of Care  Outcome: Ongoing, Progressing     Problem: Adult Inpatient Plan of Care  Goal: Patient-Specific Goal (Individualized)  Outcome: Ongoing, Progressing     Problem: Skin Injury Risk Increased  Goal: Skin Health and Integrity  Outcome: Ongoing, Progressing

## 2022-11-25 PROCEDURE — 97110 THERAPEUTIC EXERCISES: CPT

## 2022-11-25 PROCEDURE — 97165 OT EVAL LOW COMPLEX 30 MIN: CPT

## 2022-11-25 PROCEDURE — 99306 PR NURSING FACILITY CARE, INIT, HIGH SEVERITY: ICD-10-PCS | Mod: AI,,, | Performed by: HOSPITALIST

## 2022-11-25 PROCEDURE — 97116 GAIT TRAINING THERAPY: CPT

## 2022-11-25 PROCEDURE — 97535 SELF CARE MNGMENT TRAINING: CPT

## 2022-11-25 PROCEDURE — 97163 PT EVAL HIGH COMPLEX 45 MIN: CPT

## 2022-11-25 PROCEDURE — 99306 1ST NF CARE HIGH MDM 50: CPT | Mod: AI,,, | Performed by: HOSPITALIST

## 2022-11-25 PROCEDURE — 25000003 PHARM REV CODE 250: Performed by: HOSPITALIST

## 2022-11-25 PROCEDURE — 11000004 HC SNF PRIVATE

## 2022-11-25 RX ORDER — LISDEXAMFETAMINE DIMESYLATE 70 MG/1
70 CAPSULE ORAL EVERY MORNING
Status: DISCONTINUED | OUTPATIENT
Start: 2022-11-25 | End: 2022-12-14 | Stop reason: HOSPADM

## 2022-11-25 RX ADMIN — GABAPENTIN 100 MG: 100 CAPSULE ORAL at 09:11

## 2022-11-25 RX ADMIN — METOPROLOL TARTRATE 25 MG: 25 TABLET, FILM COATED ORAL at 08:11

## 2022-11-25 RX ADMIN — LOSARTAN POTASSIUM 25 MG: 25 TABLET, FILM COATED ORAL at 09:11

## 2022-11-25 RX ADMIN — GABAPENTIN 100 MG: 100 CAPSULE ORAL at 03:11

## 2022-11-25 RX ADMIN — METHOCARBAMOL 1000 MG: 500 TABLET ORAL at 05:11

## 2022-11-25 RX ADMIN — METHOCARBAMOL 1000 MG: 500 TABLET ORAL at 09:11

## 2022-11-25 RX ADMIN — METHOCARBAMOL 1000 MG: 500 TABLET ORAL at 02:11

## 2022-11-25 RX ADMIN — Medication 1 TABLET: at 09:11

## 2022-11-25 RX ADMIN — GABAPENTIN 100 MG: 100 CAPSULE ORAL at 08:11

## 2022-11-25 RX ADMIN — METOPROLOL TARTRATE 25 MG: 25 TABLET, FILM COATED ORAL at 09:11

## 2022-11-25 RX ADMIN — MENTHOL, METHYL SALICYLATE: 10; 15 CREAM TOPICAL at 09:11

## 2022-11-25 RX ADMIN — ACETAMINOPHEN 650 MG: 325 TABLET ORAL at 09:11

## 2022-11-25 RX ADMIN — PRAVASTATIN SODIUM 20 MG: 20 TABLET ORAL at 09:11

## 2022-11-25 RX ADMIN — FLUOXETINE 40 MG: 10 CAPSULE ORAL at 09:11

## 2022-11-25 RX ADMIN — BUPROPION HYDROCHLORIDE 300 MG: 300 TABLET, FILM COATED, EXTENDED RELEASE ORAL at 09:11

## 2022-11-25 RX ADMIN — ACETAMINOPHEN 1000 MG: 500 TABLET ORAL at 09:11

## 2022-11-25 RX ADMIN — SENNOSIDES AND DOCUSATE SODIUM 1 TABLET: 50; 8.6 TABLET ORAL at 09:11

## 2022-11-25 RX ADMIN — ACETAMINOPHEN 1000 MG: 500 TABLET ORAL at 02:11

## 2022-11-25 RX ADMIN — CHOLECALCIFEROL TAB 125 MCG (5000 UNIT) 5000 UNITS: 125 TAB at 09:11

## 2022-11-25 RX ADMIN — ACETAMINOPHEN 1000 MG: 500 TABLET ORAL at 06:11

## 2022-11-25 NOTE — PLAN OF CARE
Problem: Occupational Therapy  Goal: Occupational Therapy Goal  Description: Goals to be met by: 12/24/22     Patient will increase functional independence with ADLs by performing:    UE Dressing with Modified [place].  LE Dressing with Modified [place].  Grooming while standing at sink with Modified [place].  Toileting from toilet with Modified [place] for hygiene and clothing management.   Bathing from  shower chair/bench with Supervision.  Step transfer with Modified [place]  Upper extremity exercise program with supervision.  Caregiver will be educated on level of assistance required to safely perform self care and functional transfers.    Outcome: Ongoing, Progressing

## 2022-11-25 NOTE — PT/OT/SLP EVAL
Physical Therapy Evaluation    Patient Name:  Manisha Wynne   MRN:  [id number]  Admit Date: 11/23/2022  Recent Surgeries: LAMINECTOMY, SPINE, CERVICAL, WITH POSTERIOR FUSION C3-C6    General Precautions: Standard, fall   Orthopedic Precautions:spinal precautions   Braces: [place] collar     Recommendations:     Discharge Recommendations:  home health PT   Level of Assistance Recommended: Intermittent assistance   Discharge Equipment Recommendations: bedside commode, bath bench, wheelchair, walker, rolling   Barriers to discharge: Inaccessible home environment, Decreased caregiver support    Assessment:     Manisha Wynne is a 72 y.o. female admitted with a medical diagnosis of Cervical myelopathy .  Performance deficits affecting function  weakness, impaired endurance, impaired self care skills, impaired functional mobility, gait instability, impaired balance, decreased upper extremity function, decreased lower extremity function, impaired cardiopulmonary response to activity, orthopedic precautions . Pt was (I) PTA including driving and now requires Fel[place]erto for all functional mobility. Pt with no assistance when d/c home and would therefore benefit from continued SNF rehab.    Rehab Potential is good     Activity Tolerance: Good    Plan:     Patient to be seen 6 x/week to address the above listed problems via gait training, therapeutic activities, therapeutic exercises, neuromuscular re-education, wheelchair management/training     Plan of Care Expires: 12/25/22  Plan of Care Reviewed with: patient    Subjective     Chief Complaint: weakness  Patient/Family Comments/goals: gain (I)  Pain/Comfort:  Pain Rating 1: 0/10  Pain Rating Post-Intervention 1: 0/10    Living Environment:   Pt lives alone in a [facility] w/ 10STE with B HRs. Pt has a tub/shower combo.  Prior to admission, patients level of function was independent.  Equipment used at home: other (see comments) (walking sticks for community  ambulation).  DME owned (not currently used):tub bench.  Upon discharge, patient will have assistance from unknown; pt states she can call neighbor to come help with tasks real quickly if needed intermittently but not daily assistance.      Patients cultural, spiritual, Anabaptism conflicts given the current situation: no    Objective:     Communicated with pt's nurse prior to session.  Patient found up in chair with cervical collar  upon PT entry to room.    Exams:  Cognitive Exam:  Patient is oriented to Person, Place, and Situation  Gross Motor Coordination:  WFL  Sensation:    -       Intact  Skin Integrity/Edema:      -       Skin integrity: Visible skin intact  RLE ROM: WFL  RLE Strength: WFL  LLE ROM: WFL  LLE Strength: WFL    Functional Mobility:     11/25/22 1121   Prior Functioning: Everyday Activities   Self Care Independent   Indoor Mobility (Ambulation) Independent   Stairs Independent   Functional Cognition Independent   Prior Device Use None of the given options   Roll Left and Right   Physical Assistance Level 25% or less   CARE Score - Roll Left and Right 3   Sit to Lying   Physical Assistance Level 25% or less   CARE Score - Sit to Lying 3   Lying to Sitting on Side of Bed   Physical Assistance Level 25% or less   CARE Score - Lying to Sitting on Side of Bed 3   Sit to Stand   Physical Assistance Level 25% or less   Comment with RW   CARE Score - Sit to Stand 3   Chair/Bed-to-Chair Transfer   Physical Assistance Level 25% or less   Comment SPT with RW   CARE Score - Chair/Bed-to-Chair Transfer 3   Chair/Bed-to-Chair Transfer Discharge Goal   Discharge Goal 4   Car Transfer   Reason if not Attempted Environmental limitations   CARE Score - Car Transfer 10   Walk 10 Feet   Physical Assistance Level 25% or less   Comment with RW, slight lateral instability, decrease step length and aron. pt became fatigued at 110ftx 2 trials   CARE Score - Walk 10 Feet 3   Walk 50 Feet with Two Turns   Physical  Assistance Level 25% or less   Comment with RW, slight lateral instability, decrease step length and aron. pt became fatigued at 110ftx 2trials   CARE Score - Walk 50 Feet with Two Turns 3   Walk 150 Feet   Reason if not Attempted Safety concerns   CARE Score - Walk 150 Feet 88   Walking 10 Feet on Uneven Surfaces   Physical Assistance Level 25% or less   Comment with RW   CARE Score - Walking 10 Feet on Uneven Surfaces 3   1 Step (Curb)   Physical Assistance Level 25% or less   Comment with RW; 4inch curb step   CARE Score - 1 Step (Curb) 3   4 Steps   Physical Assistance Level 25% or less   Comment using B HRs   CARE Score - 4 Steps 3   12 Steps   Physical Assistance Level 25% or less   Comment using B HRs   CARE Score - 12 Steps 3   Picking Up Object   Reason if not Attempted Safety concerns   CARE Score - Picking Up Object 88   Uses a Wheelchair/Scooter?   Uses a Wheelchair/Scooter? Yes   Wheel 50 Feet with Two Turns   Assistance Needed Supervision   Comment using B L/E's and U/E's   CARE Score - Wheel 50 Feet with Two Turns 4   Type of Wheelchair/Scooter Manual   Wheel 150 Feet   Assistance Needed Supervision   Comment using B L/E's and U/E's   CARE Score - Wheel 150 Feet 4   Type of Wheelchair/Scooter Manual       Therapeutic Activities and Exercises:Mini-eliptical x 10mins to help improve BL/E endurance with resistance set at medium.    Education:  Patient provided with daily orientation and goals of this PT session.  Patient educated to transfer to bedside chair/bedside commode/bathroom with RN/PCT present.   Patient educated on Fall risk and plan of care by explanation.   Patient Verbalized Understanding.  Time provided for therapeutic counseling and discussion of current health disposition. All questions answered to satisfaction, within scope of PT practice; voiced no other concerns. White board updated in patient's room, RN notified of session.       AM-PAC 6 CLICK MOBILITY  Total Score:18     Patient  left up in chair with call button in reach.    GOALS:   Multidisciplinary Problems       Physical Therapy Goals          Problem: Physical Therapy    Goal Priority Disciplines Outcome Goal Variances Interventions   Physical Therapy Goal     PT, PT/OT Ongoing, Progressing     Description: Goals to be met by: 12/25/22    Patient will increase functional independence with mobility by performing:    . Supine to sit with Modified [place]  . Sit to supine with Modified [place]  . Rolling to Left and Right with Modified [place].  . Sit to stand transfer with Supervision  . Bed to chair transfer with Supervision using Rolling Walker/no AD  . Gait  x 150 feet with Supervision using Rolling Walker.   . Wheelchair propulsion x150 feet with Modified [place] using bilateral uppper extremities  . Ascend/descend 12 stair with bilateral Handrails Supervision using No Assistive Device.   . Ascend/Descend 4 inch curb step with Supervision using Rolling Walker.                         History:     Past Medical History:   Diagnosis Date    Abnormal Pap smear     Atrial fibrillation     AV block, 1st degree 07/25/2012    C. difficile diarrhea     RESOLVED    Cataract     Depression 07/24/2012    Facet arthritis of lumbar region 03/31/2015    Falls     3 falls in the last 6 mos--noted 6/19/19    Hyperlipidemia     Hypertension 07/24/2012    Neuropathy     Other specified cardiac dysrhythmias(427.89)     Sleep apnea     Syncope 07/24/2012    Tremors of nervous system     hands bilaterally       Past Surgical History:   Procedure Laterality Date    APPLICATION OF CARTILAGE GRAFT Bilateral 12/19/2019    Procedure: APPLICATION, CARTILAGE GRAFT AURICULAR JEZ;  Surgeon: Martinez Flores III, MD;  [facility];  Service: ENT;  Laterality: Bilateral;    CARDIAC PACEMAKER PLACEMENT  09/07/2012    [id number]    CATARACT EXTRACTION W/  INTRAOCULAR LENS IMPLANT Right 5/16/2022    Procedure: EXTRACTION,  CATARACT, WITH IOL INSERTION;  Surgeon: Ines Aguilera MD;  Location: [facility] OR;  Service: Ophthalmology;  Laterality: Right;  Catalys     CATARACT EXTRACTION W/  INTRAOCULAR LENS IMPLANT Left 6/20/2022    Procedure: EXTRACTION, CATARACT, WITH IOL INSERTION;  Surgeon: Ines Aguilera MD;  Location: [facility] OR;  Service: Ophthalmology;  Laterality: Left;  Catalys     DILATION AND CURETTAGE OF UTERUS      Hx of precancerous cells?    ENDOSCOPIC ULTRASOUND OF UPPER GASTROINTESTINAL TRACT N/A 7/5/2019    Procedure: ULTRASOUND, UPPER GI TRACT, ENDOSCOPIC;  Surgeon: Kev Calderon MD;  Location: [facility] ENDO (2ND FLR);  Service: Endoscopy;  Laterality: N/A;  Pacemaker-Adam   appt confirmed-rb    MYELOGRAPHY N/A 5/4/2021    Procedure: Myelogram  CERVICAL, THORACIC AND LUMBAR;  Surgeon: [facility] Diagnostic Provider;  Location: [facility] 2ND FLR;  Service: Radiology;  Laterality: N/A;    NASAL SEPTOPLASTY N/A 12/19/2019    Procedure: SEPTOPLASTY, NOSE;  Surgeon: Martinez Flores III, MD;  Location: [facility];  Service: ENT;  Laterality: N/A;  FOLLOW DR SALVATORE KIMBROUGH PROTOCOL    OPEN REDUCTION AND INTERNAL FIXATION (ORIF) OF FRACTURE OF DISTAL RADIUS Left 1/24/2020    Procedure: ORIF, FRACTURE, RADIUS, DISTAL-left;  Surgeon: Ellen Moe MD;  Location: [facility] OR;  Service: Orthopedics;  Laterality: Left;    POSTERIOR FUSION OF CERVICAL SPINE WITH LAMINECTOMY N/A 11/14/2022    Procedure: LAMINECTOMY, SPINE, CERVICAL, WITH POSTERIOR FUSION C3-C6;  Surgeon: Nicolette Cheek MD;  Location: [facility] 2ND FLR;  Service: Neurosurgery;  Laterality: N/A;  TORONTO III, ASA III, BLOOD TYPE AND SCREEN, NEUROMONITORING EMG SEP MEP, BRACE/HALO [place], BED REGULAR BED, HEADREST [place], POSITION PRONE, SPECIAL EQUIPMENT NIKI [place], RADIOLOGY C-ARM, EXT. BONE STIMULATOR BIOMET    REPLACEMENT OF PACEMAKER GENERATOR Left 10/7/2022    Procedure: REPLACEMENT, PACEMAKER GENERATOR;  Surgeon: John Sheets MD;  Location: [facility] EP LAB;  Service:  Cardiology;  Laterality: Left;  YAS, SSS, AVB, Dual PPM Gen Change,SJM, MAC ,NM, 3 prep,** Adam dcPPM in situ**    SURGICAL REMOVAL OF NASAL TURBINATE Bilateral 12/19/2019    Procedure: EXCISION, NASAL TURBINATE;  Surgeon: Martinez Flores III, MD;  Location: [facility];  Service: ENT;  Laterality: Bilateral;    TONSILLECTOMY      WISDOM TOOTH EXTRACTION         Time Tracking:     PT Received On: 11/25/22  PT Start Time: 1100     PT Stop Time: 1143  PT Total Time (min): 43 min     Billable Minutes: Evaluation 20mins, Gait Training 10mins, and Therapeutic Exercise 13mins      11/25/2022

## 2022-11-25 NOTE — PLAN OF CARE
Recommendations  1) Continue Regular diet - Add Kosher Diet Food Preference  2) Honor meal preferences  3) RD to complete NFPE at follow up    Goals: Pt to continue to meet > 75% EEN by RD follow up  Nutrition Goal Status: new  Communication of RD Recs: other (comment) (POC)    Assessment and Plan    Nutrition Problem  Increased nutrient needs    Related to (etiology):   Wound healing    Signs and Symptoms (as evidenced by):   Incision to neck    Interventions(treatment strategy):  General, Healthful diet  Collaboration with other providers    Nutrition Diagnosis Status:   New

## 2022-11-25 NOTE — CONSULTS
"  [facility], [place] - Skilled Nursing  Adult Nutrition  Consult Note    SUMMARY     Recommendations  1) Continue Regular diet - Add Kosher Diet Food Preference  2) Honor meal preferences  3) RD to complete NFPE at follow up    Goals: Pt to continue to meet > 75% EEN by RD follow up  Nutrition Goal Status: new  Communication of RD Recs: other (comment) (POC)    Assessment and Plan    Nutrition Problem  Increased nutrient needs    Related to (etiology):   Wound healing    Signs and Symptoms (as evidenced by):   Incision to neck    Interventions(treatment strategy):  General, Healthful diet  Collaboration with other providers    Nutrition Diagnosis Status:   New      Malnutrition Assessment   MAURIZIO NFPE due to remote assessment    Reason for Assessment  Reason For Assessment: consult (New admit)  Diagnosis:  (cervical myelopathy)  Relevant Medical History: Afib, depression, HTN, HLD  Interdisciplinary Rounds: did not attend  General Information Comments: RD remote for coverage, called pt on room phone. Follows Kosher diet. Endorses good appetite but feels protein intake insufficient during admit. Offered Boost for snack between meals - pt declined at this time, and communicated Confucianism food preferences with the kitchen. -160 lbs per chart, appears stable.   Nutrition Discharge Planning: Discharge on Cardiac Diet    Nutrition Risk Screen  Nutrition Risk Screen: no indicators present    Nutrition/Diet History  Food Allergies: NKFA  Factors Affecting Nutritional Intake: None identified at this time    Anthropometrics  Temp: 97.9 °F (36.6 °C)  Height Method: Stated  Height: 5' 8" (172.7 cm)  Height (inches): 68 in  Weight Method: Standard Scale  Weight: 74.6 kg (164 lb 7.4 oz)  Weight (lb): 164.46 lb  Ideal Body Weight (IBW), Female: 140 lb  % Ideal Body Weight, Female (lb): 117.47 %  BMI (Calculated): 25       Lab/Procedures/Meds  Pertinent Labs Reviewed: reviewed  Pertinent Medications Reviewed: reviewed  Pertinent " Medications Comments: Calcium and vitamin D, Vitamin D3, pravastatin, gabapentin, senna-docusate    Estimated/Assessed Needs  Weight Used For Calorie Calculations: 74.4 kg (164 lb)  Energy Calorie Requirements (kcal): 1550 kcal/day based on MSJ x 1.2 AF  Energy Need Method: [place]-St Jeor  Protein Requirements: 75-89 g/day based on 1-1.2 g/kg  Weight Used For Protein Calculations: 74.4 kg (164 lb)  Fluid Requirements (mL): 1 mL/kcal or per MD  Estimated Fluid Requirement Method: RDA Method  RDA Method (mL): 1550       Nutrition Prescription Ordered  Current Diet Order: Regular    Evaluation of Received Nutrient/Fluid Intake    Intake/Output Summary (Last 24 hours) at 11/25/2022 1011  Last data filed at 11/25/2022 0845  Gross per 24 hour   Intake 1320 ml   Output --   Net 1320 ml       I/O: +1.56 L since admit  Energy Calories Required: meeting needs  Protein Required: meeting needs  Fluid Required: meeting needs  Comments: LBM 11/24  Tolerance: tolerating  % Intake of Estimated Energy Needs: 75 - 100 %  % Meal Intake: 75 - 100 %    Nutrition Risk  Level of Risk/Frequency of Follow-up: low (1x weekly)     Monitor and Evaluation  Food and Nutrient Intake: energy intake, food and beverage intake, enteral nutrition intake  Food and Nutrient Adminstration: diet order  Knowledge/Beliefs/Attitudes: food and nutrition knowledge/skill, beliefs and attitudes  Physical Activity and Function: nutrition-related ADLs and IADLs  Anthropometric Measurements: weight change, body mass index, weight  Biochemical Data, Medical Tests and Procedures: lipid profile, electrolyte and renal panel, gastrointestinal profile, glucose/endocrine profile, inflammatory profile  Nutrition-Focused Physical Findings: overall appearance, extremities, muscles and bones, skin     Nutrition Follow-Up  RD Follow-up?: Yes

## 2022-11-25 NOTE — PLAN OF CARE
Problem: Physical Therapy  Goal: Physical Therapy Goal  Description: Goals to be met by: 12/25/22    Patient will increase functional independence with mobility by performing:    . Supine to sit with Modified [place]  . Sit to supine with Modified [place]  . Rolling to Left and Right with Modified [place].  . Sit to stand transfer with Supervision  . Bed to chair transfer with Supervision using Rolling Walker/no AD  . Gait  x 150 feet with Supervision using Rolling Walker.   . Wheelchair propulsion x150 feet with Modified [place] using bilateral uppper extremities  . Ascend/descend 12 stair with bilateral Handrails Supervision using No Assistive Device.   . Ascend/Descend 4 inch curb step with Supervision using Rolling Walker.    Outcome: Ongoing, Progressing

## 2022-11-25 NOTE — PT/OT/SLP EVAL
"Occupational Therapy   Evaluation and Treatment    Name: Manisha Wynne  MRN: [id number]  Admit Date: 11/23/2022  Admitting Diagnosis: Cervical myelopathy  Recent Surgeries:  Procedure(s) (LRB):  LAMINECTOMY, SPINE, CERVICAL, WITH POSTERIOR FUSION C3-C6     General Precautions: Standard, fall   Orthopedic Precautions:spinal precautions   Braces: Aspen collar     Recommendations:     Discharge Recommendations: home health OT  Level of Assistance Recommended: Intermittent assistance   Discharge Equipment Recommendations:  3-in-1 commode, bath bench, wheelchair, walker, rolling  Barriers to discharge:  Inaccessible home environment, Decreased caregiver support    Assessment:     Manisha Wynne is a 72 y.o. female with a medical diagnosis of cervical myelopathy.  She presents with the following performance deficits affecting function: weakness, impaired endurance, impaired self care skills, impaired functional mobility, gait instability, impaired balance, decreased upper extremity function, decreased lower extremity function, pain, orthopedic precautions, impaired cardiopulmonary response to activity.  Pt wearing Aspen collar upon OT entry to room, collar noted to be very loose. Max A to tighten collar and educated pt on importance of correct fit.  Pt tolerated session well and without incident, but she continues to require assistance to perform self-care tasks and mobility.  She would continue to benefit from skilled OT services at SNF to maximize her gains in functional independence.      Rehab Potential is good    Activity Tolerance: Good    Plan:     Patient to be seen 5 x/week to address the above listed problems via self-care/home management, therapeutic activities, therapeutic exercises  Plan of Care Expires: 12/24/22  Plan of Care Reviewed with: patient    Subjective   "Pain is all relative. Yes, I'm in some pain"  Chief Complaint: neck pain  Patient/Family Comments/goals: return to " independence    Occupational Profile:  Living Environment: Pt lives alone in a duplex with 10 [address]; no steps once inside home. Has a tub/shower combo.   Previous level of function: IND with all ADLs and functional mobility. Reports using walking sticks occasionally during community mobility  Roles and Routines: Pt works PRN at hospital as a   Equipment Used at Home:  other (see comments) (reacher and walking sticks)  Assistance upon Discharge: None    Pain/Comfort:  Pain Rating 1: 6/10  Location - Side 1: Bilateral  Location - Orientation 1: generalized  Location 1: neck  Pain Addressed 1: Reposition, Distraction  Pain Rating Post-Intervention 1: 5/10    Patients cultural, spiritual, Moravian conflicts given the current situation: no    Objective:     Communicated with: Nsg prior to session.  Patient found  sitting on toilet  with cervical collar upon OT entry to room.    Occupational Performance:     Bed Mobility:    N/A due to pt in bathroom upon arrival and in chair at end of session    Functional Mobility/Transfers:  See below  Functional Mobility: Pt completed ambulation of household distances within room, approx. 40', with SBA and RW.      Eating   Assistance Needed Set-up / clean-up   Physical Assistance Level No physical assistance   CARE Score - Eating 5   Oral Hygiene   Assistance Needed Set-up / clean-up   Physical Assistance Level No physical assistance   Comment standing at sink w/ SBA and RW   CARE Score - Oral Hygiene 5   Toileting Hygiene   Assistance Needed Verbal cues   Physical Assistance Level No physical assistance   Comment verbal cues to avoid twisting during pericares while sitting on toilet; IND with clothing mgmt   CARE Score - Toileting Hygiene 4   Shower/Bathe Self   Assistance Needed Physical assistance   Physical Assistance Level 25% or less   Comment Per clinical judgment based on pt's ability to perform dressing tasks, would require min A to shower from shower chair   CARE  Score - Shower/Bathe Self 3   Upper Body Dressing   Assistance Needed Verbal cues   Physical Assistance Level No physical assistance   Comment to [facility] gown and don pullover shirt while seated   CARE Score - Upper Body Dressing 4   Lower Body Dressing   Assistance Needed Physical assistance   Physical Assistance Level 25% or less   Comment Min A to doff/don underwear and pants with (A) to thread 2nd leg through pantleg of pants. Pt required verbal cues to utilize figure-4 method to adhere to spinal precautions.   CARE Score - Lower Body Dressing 3   Putting On/Taking Off Footwear   Assistance Needed Verbal cues   Physical Assistance Level No physical assistance   Comment to don/doff socks using figure-4 method   CARE Score - Putting On/Taking Off Footwear 4   Sit to Stand   Assistance Needed Physical assistance   Physical Assistance Level 25% or less   Comment Min A to stand from chair w/ RW; pt benefits from cues to complete rocking for increased momentum and requires cues for safe hand placement   CARE Score - Sit to Stand 3   Toilet Transfer   Assistance Needed Incidental touching   Physical Assistance Level No physical assistance   Comment using grab bars and RW   CARE Score - Toilet Transfer 4       Cognitive/Visual Perceptual:  Cognitive/Psychosocial Skills:     -       Oriented to: Person, Place, Time, and Situation   -       Follows Commands/attention:Follows multistep  commands  -       Communication: clear/fluent  -       Memory: No Deficits noted  -       Safety awareness/insight to disability: intact   -       Mood/Affect/Coping skills/emotional control: Appropriate to situation  Visual/Perceptual:      -Intact wears glasses    Physical Exam:  Postural examination/scapula alignment:    -       No postural abnormalities identified  Skin integrity: Visible skin intact  Edema:  None noted  Sensation:    -       Intact  Motor Planning:    -       WFL  Dominant hand:    -       Right  Upper Extremity  Range of Motion:     -       Right Upper Extremity: WFL  -       Left Upper Extremity: WFL  Upper Extremity Strength:    -       Right Upper Extremity: Not formally tested due to spinal precautions, but appears WFL  -       Left Upper Extremity: Not formally tested due to spinal precautions, but appears WFL   Strength: -       Right Upper Extremity: WFL  -       Left Upper Extremity: WFL  Fine Motor Coordination:    -       Intact    AMPAC 6 Click ADL:  AMPAC Total Score: 20    Treatment & Education:  Pt able to recall 3/3 spinal precautions.  Educated pt to complete rocking prior to stand for increased momentum  Pt edu on role of OT, POC, safety when performing self care tasks , benefit of performing OOB activity, and safety when performing functional transfers and mobility.  - Self care tasks completed-- as noted above    -Whiteboard updated    Patient left up in chair with call button in reach and Nsg notified    GOALS:   Multidisciplinary Problems       Occupational Therapy Goals          Problem: Occupational Therapy    Goal Priority Disciplines Outcome Interventions   Occupational Therapy Goal     OT, PT/OT Ongoing, Progressing    Description: Goals to be met by: 12/24/22     Patient will increase functional independence with ADLs by performing:    UE Dressing with Modified [place].  LE Dressing with Modified [place].  Grooming while standing at sink with Modified [place].  Toileting from toilet with Modified [place] for hygiene and clothing management.   Bathing from  shower chair/bench with Supervision.  Step transfer with Modified [place]  Upper extremity exercise program with supervision.  Caregiver will be educated on level of assistance required to safely perform self care and functional transfers.                         History:     Past Medical History:   Diagnosis Date    Abnormal Pap smear     Atrial fibrillation     AV block, 1st degree 07/25/2012    C. difficile  diarrhea     RESOLVED    Cataract     Depression 07/24/2012    Facet arthritis of lumbar region 03/31/2015    Falls     3 falls in the last 6 mos--noted 6/19/19    Hyperlipidemia     Hypertension 07/24/2012    Neuropathy     Other specified cardiac dysrhythmias(427.89)     Sleep apnea     Syncope 07/24/2012    Tremors of nervous system     hands bilaterally         Past Surgical History:   Procedure Laterality Date    APPLICATION OF CARTILAGE GRAFT Bilateral 12/19/2019    Procedure: APPLICATION, CARTILAGE GRAFT AURICULAR JEZ;  Surgeon: Martinez Flores III, MD;  Location: [facility];  Service: ENT;  Laterality: Bilateral;    CARDIAC PACEMAKER PLACEMENT  09/07/2012    [id number]    CATARACT EXTRACTION W/  INTRAOCULAR LENS IMPLANT Right 5/16/2022    Procedure: EXTRACTION, CATARACT, WITH IOL INSERTION;  Surgeon: Ines Aguilera MD;  Location: [facility];  Service: Ophthalmology;  Laterality: Right;  Catalys     CATARACT EXTRACTION W/  INTRAOCULAR LENS IMPLANT Left 6/20/2022    Procedure: EXTRACTION, CATARACT, WITH IOL INSERTION;  Surgeon: Ines Aguilera MD;  Location: [facility];  Service: Ophthalmology;  Laterality: Left;  Catalys     DILATION AND CURETTAGE OF UTERUS      Hx of precancerous cells?    ENDOSCOPIC ULTRASOUND OF UPPER GASTROINTESTINAL TRACT N/A 7/5/2019    Procedure: ULTRASOUND, UPPER GI TRACT, ENDOSCOPIC;  Surgeon: Kev Calderon MD;  Location: [facility] (2ND FLR);  Service: Endoscopy;  Laterality: N/A;  Pacemaker-Adam   appt confirmed-rb    MYELOGRAPHY N/A 5/4/2021    Procedure: Myelogram  CERVICAL, THORACIC AND LUMBAR;  Surgeon: [facility];  Location: [facility] 2ND FLR;  Service: Radiology;  Laterality: N/A;    NASAL SEPTOPLASTY N/A 12/19/2019    Procedure: SEPTOPLASTY, NOSE;  Surgeon: Martinez Flores III, MD;  Location: [facility];  Service: ENT;  Laterality: N/A;  FOLLOW DR SALVATORE KIMBROUGH PROTOCOL    OPEN REDUCTION AND INTERNAL FIXATION (ORIF) OF FRACTURE OF DISTAL RADIUS Left  1/24/2020    Procedure: ORIF, FRACTURE, RADIUS, DISTAL-left;  Surgeon: Ellen Moe MD;  Location: [facility] OR;  Service: Orthopedics;  Laterality: Left;    POSTERIOR FUSION OF CERVICAL SPINE WITH LAMINECTOMY N/A 11/14/2022    Procedure: LAMINECTOMY, SPINE, CERVICAL, WITH POSTERIOR FUSION C3-C6;  Surgeon: Nicolette Cheek MD;  [address];  Service: Neurosurgery;  Laterality: N/A;  [place] III, ASA III, BLOOD TYPE AND SCREEN, NEUROMONITORING EMG SEP MEP, BRACE/HALO [place], BED REGULAR BED, HEADREST [place], POSITION PRONE, SPECIAL EQUIPMENT NIKI [place], RADIOLOGY C-ARM, EXT. BONE STIMULATOR BIOMET    REPLACEMENT OF PACEMAKER GENERATOR Left 10/7/2022    Procedure: REPLACEMENT, PACEMAKER GENERATOR;  Surgeon: John Sheets MD;  Location: [facility] EP [facility];  Service: Cardiology;  Laterality: Left;  YAS, SSS, AVB, Dual PPM Gen Change,SJM, MAC ,VT, 3 prep,** Adam dcPPM in situ**    SURGICAL REMOVAL OF NASAL TURBINATE Bilateral 12/19/2019    Procedure: EXCISION, NASAL TURBINATE;  Surgeon: Martinez Flores III, MD;  Location: [facility] OR;  Service: ENT;  Laterality: Bilateral;    TONSILLECTOMY      WISDOM TOOTH EXTRACTION         Time Tracking:     OT Date of Treatment: 11/25/22  OT Start Time: 0938  OT Stop Time: 1013  OT Total Time (min): 35 min    Billable Minutes:Evaluation 10  Self Care/Home Management 25 11/25/2022

## 2022-11-26 PROCEDURE — 97110 THERAPEUTIC EXERCISES: CPT | Mod: CO

## 2022-11-26 PROCEDURE — 97530 THERAPEUTIC ACTIVITIES: CPT | Mod: CO

## 2022-11-26 PROCEDURE — 97116 GAIT TRAINING THERAPY: CPT | Mod: CQ

## 2022-11-26 PROCEDURE — 25000003 PHARM REV CODE 250: Performed by: HOSPITALIST

## 2022-11-26 PROCEDURE — 97110 THERAPEUTIC EXERCISES: CPT | Mod: CQ

## 2022-11-26 PROCEDURE — 94761 N-INVAS EAR/PLS OXIMETRY MLT: CPT

## 2022-11-26 PROCEDURE — 97530 THERAPEUTIC ACTIVITIES: CPT | Mod: CQ

## 2022-11-26 PROCEDURE — 25000242 PHARM REV CODE 250 ALT 637 W/ HCPCS: Performed by: HOSPITALIST

## 2022-11-26 PROCEDURE — 11000004 HC SNF PRIVATE

## 2022-11-26 RX ADMIN — LOSARTAN POTASSIUM 25 MG: 25 TABLET, FILM COATED ORAL at 10:11

## 2022-11-26 RX ADMIN — METOPROLOL TARTRATE 25 MG: 25 TABLET, FILM COATED ORAL at 08:11

## 2022-11-26 RX ADMIN — FLUOXETINE 40 MG: 10 CAPSULE ORAL at 10:11

## 2022-11-26 RX ADMIN — GABAPENTIN 100 MG: 100 CAPSULE ORAL at 08:11

## 2022-11-26 RX ADMIN — METOPROLOL TARTRATE 25 MG: 25 TABLET, FILM COATED ORAL at 10:11

## 2022-11-26 RX ADMIN — MENTHOL, METHYL SALICYLATE: 10; 15 CREAM TOPICAL at 08:11

## 2022-11-26 RX ADMIN — Medication 1 TABLET: at 10:11

## 2022-11-26 RX ADMIN — ACETAMINOPHEN 1000 MG: 500 TABLET ORAL at 01:11

## 2022-11-26 RX ADMIN — METHOCARBAMOL 1000 MG: 500 TABLET ORAL at 09:11

## 2022-11-26 RX ADMIN — METHOCARBAMOL 1000 MG: 500 TABLET ORAL at 01:11

## 2022-11-26 RX ADMIN — Medication 70 MG: at 06:11

## 2022-11-26 RX ADMIN — ACETAMINOPHEN 1000 MG: 500 TABLET ORAL at 06:11

## 2022-11-26 RX ADMIN — METHOCARBAMOL 1000 MG: 500 TABLET ORAL at 06:11

## 2022-11-26 RX ADMIN — GABAPENTIN 100 MG: 100 CAPSULE ORAL at 02:11

## 2022-11-26 RX ADMIN — BUPROPION HYDROCHLORIDE 300 MG: 300 TABLET, FILM COATED, EXTENDED RELEASE ORAL at 10:11

## 2022-11-26 RX ADMIN — GABAPENTIN 100 MG: 100 CAPSULE ORAL at 10:11

## 2022-11-26 NOTE — PT/OT/SLP PROGRESS
"Physical Therapy Treatment    Patient Name:  Manisha Wynne   MRN:  [id number]  Admit Date: 11/23/2022  Admitting Diagnosis: Cervical myelopathy  Recent Surgeries:     General Precautions: Standard, fall   Orthopedic Precautions:spinal precautions   Braces: Aspen collar     Recommendations:     Discharge Recommendations:  home health PT   Level of Assistance Recommended at Discharge: Intermittent assistance   Discharge Equipment Recommendations: bedside commode, bath bench, wheelchair, walker, rolling   Barriers to discharge: Inaccessible home environment, Decreased caregiver support    Assessment:     Manisha Wynne is a 72 y.o. female admitted with a medical diagnosis of Cervical myelopathy . Pt tolerated well, pt is very pleasant, demo good effort, pt would continue to benefit from skilled PT services to improve overall functional mobility, strength and endurance.  .      Performance deficits affecting function:  weakness, impaired endurance, impaired self care skills, impaired functional mobility, gait instability, impaired balance, decreased upper extremity function, decreased lower extremity function, impaired cardiopulmonary response to activity, orthopedic precautions .    Rehab Potential is good    Activity Tolerance: Fair    Plan:     Patient to be seen 6 x/week to address the above listed problems via gait training, therapeutic activities, therapeutic exercises, neuromuscular re-education, wheelchair management/training    Plan of Care Expires: 12/25/22  Plan of Care Reviewed with: patient    Subjective     "Are you coming for me now?" Agreeable to therapy.     Pain/Comfort:  Pain Rating 1: 8/10  Location - Side 1: Bilateral  Location - Orientation 1: generalized  Location 1: shoulder  Pain Addressed 1: Pre-medicate for activity  Pain Rating Post-Intervention 1: 8/10 (no further mentioned)    Patient's cultural, spiritual, Christianity conflicts given the current situation:  no    Objective: " "      Patient found with cervical collar upon PT entry to room. Pt able to recall spinal prec 3/3    Therapeutic Activities and Exercises: mini elliptical x 12 minutes    Functional Mobility:  Transfers:     Sit to Stand:  minimum assistance with rolling walker  Bed to Chair: contact guard assistance and minimum assistance with  rolling walker  using  Stand Pivot and ~ 5 ft BSC to WC ,  stand pivot WC to BSCommode over toilet ~ 8 ft CGA small threshold, CGA to manage under garments  Gait amb with RW min/CGA ~ 150 ft no LOB  Stairs:  asc/robin 4 step with BHR x 2 trial(8) vcs for safety/tech CGA  Curb asc/robin 4" curb with RW CGA vcs for safety/tech    AM-PAC 6 CLICK MOBILITY  18    Patient left in bathroom on toilet pt wanting to sit a while, promised to call for A, ed on fall risk,  notified with notify PCT    GOALS:   Multidisciplinary Problems       Physical Therapy Goals          Problem: Physical Therapy    Goal Priority Disciplines Outcome Goal Variances Interventions   Physical Therapy Goal     PT, PT/OT Ongoing, Progressing     Description: Goals to be met by: 12/25/22    Patient will increase functional independence with mobility by performing:    . Supine to sit with Modified [place]  . Sit to supine with Modified [place]  . Rolling to Left and Right with Modified [place].  . Sit to stand transfer with Supervision  . Bed to chair transfer with Supervision using Rolling Walker/no AD  . Gait  x 150 feet with Supervision using Rolling Walker.   . Wheelchair propulsion x150 feet with Modified [place] using bilateral uppper extremities  . Ascend/descend 12 stair with bilateral Handrails Supervision using No Assistive Device.   . Ascend/Descend 4 inch curb step with Supervision using Rolling Walker.                         Time Tracking:     PT Received On: 11/26/22  PT Start Time: 1122  PT Stop Time: 1204  PT Total Time (min): 42 min    Billable Minutes: Gait Training 15, Therapeutic " Activity 15, and Therapeutic Exercise 12    Treatment Type: Treatment  PT/PTA: PTA     PTA Visit Number: 1     11/26/2022

## 2022-11-26 NOTE — PT/OT/SLP PROGRESS
Occupational Therapy   Treatment    Name: Manisha Wynne  MRN: [id number]  Admit Date: 11/23/2022  Admitting Diagnosis:  Cervical myelopathy    General Precautions: Standard, fall   Orthopedic Precautions:spinal precautions   Braces: Aspen collar     Recommendations:     Discharge Recommendations: home health OT  Level of Assistance Recommended at Discharge: Intermittent supervision  Discharge Equipment Recommendations:  3-in-1 commode, bath bench, wheelchair, walker, rolling  Barriers to discharge:  Inaccessible home environment    Assessment:     Manisha Wynne is a 72 y.o. female with a medical diagnosis of Cervical myelopathy .  She presents with performance deficits affecting function are weakness, impaired self care skills, impaired functional mobility, impaired endurance, gait instability, impaired balance, decreased upper extremity function, decreased lower extremity function, pain, orthopedic precautions, impaired cardiopulmonary response to activity.  Pt tolerated session well and without incident, but she continues to require assistance to perform self-care tasks and mobility.  She would continue to benefit from skilled OT services at SNF to maximize her gains in functional independence.      Rehab Potential is good    Activity tolerance:  Good    Plan:     Patient to be seen 5 x/week to address the above listed problems via self-care/home management, therapeutic exercises, therapeutic activities    Plan of Care Expires: 12/24/22  Plan of Care Reviewed with: patient    Subjective     Communicated with: nsg prior to session. Pt agreeable to therapy treatment .    Pain/Comfort:  Pain Rating 1: 0/10  Pain Rating Post-Intervention 1: 0/10    Patient's cultural, spiritual, Muslim conflicts given the current situation:  no    Objective:     Patient found up in chair with cervical collar upon OT entry to room.    Functional Mobility/Transfers:  Patient completed Chair Transfer using Step  Transfer technique with minimum assistance with rolling walker  Functional Mobility: pt propelled w/c from room to therapy gym 63 ft with SBA using BUEs    AMPAC 6 Click ADL: 20    Therapeutic Activity:   Pt performed standing balance with CGA using RW while performing bilateral ax reaching over head and crossing midline x2 trials for 6 min 18 sec and 5 min 27sec to increase standing balance and tolerance for ADLs and functional mobility     Treatment & Education:  Pt educated on standing tolerance, functional mobility and safety awareness    Patient left up in chair with all lines intact and call button in reach    GOALS:   Multidisciplinary Problems       Occupational Therapy Goals          Problem: Occupational Therapy    Goal Priority Disciplines Outcome Interventions   Occupational Therapy Goal     OT, PT/OT Ongoing, Progressing    Description: Goals to be met by: 12/24/22     Patient will increase functional independence with ADLs by performing:    UE Dressing with Modified [place].  LE Dressing with Modified [place].  Grooming while standing at sink with Modified [place].  Toileting from toilet with Modified [place] for hygiene and clothing management.   Bathing from  shower chair/bench with Supervision.  Step transfer with Modified [place]  Upper extremity exercise program with supervision.  Caregiver will be educated on level of assistance required to safely perform self care and functional transfers.                         Time Tracking:     OT Date of Treatment: 11/26/22  OT Start Time: 0900    OT Stop Time: 0933  OT Total Time (min): 33 min    Billable Minutes:Therapeutic Activity 18  Therapeutic Exercise 15    11/26/2022

## 2022-11-27 LAB
BACTERIA BLD CULT: NORMAL
BACTERIA BLD CULT: NORMAL

## 2022-11-27 PROCEDURE — 11000004 HC SNF PRIVATE

## 2022-11-27 PROCEDURE — 25000242 PHARM REV CODE 250 ALT 637 W/ HCPCS: Performed by: HOSPITALIST

## 2022-11-27 PROCEDURE — 25000003 PHARM REV CODE 250: Performed by: HOSPITALIST

## 2022-11-27 PROCEDURE — 97535 SELF CARE MNGMENT TRAINING: CPT | Mod: CO

## 2022-11-27 RX ADMIN — METOPROLOL TARTRATE 25 MG: 25 TABLET, FILM COATED ORAL at 08:11

## 2022-11-27 RX ADMIN — GABAPENTIN 100 MG: 100 CAPSULE ORAL at 09:11

## 2022-11-27 RX ADMIN — BUPROPION HYDROCHLORIDE 300 MG: 300 TABLET, FILM COATED, EXTENDED RELEASE ORAL at 09:11

## 2022-11-27 RX ADMIN — METHOCARBAMOL 1000 MG: 500 TABLET ORAL at 09:11

## 2022-11-27 RX ADMIN — METHOCARBAMOL 1000 MG: 500 TABLET ORAL at 02:11

## 2022-11-27 RX ADMIN — LOSARTAN POTASSIUM 25 MG: 25 TABLET, FILM COATED ORAL at 09:11

## 2022-11-27 RX ADMIN — GABAPENTIN 100 MG: 100 CAPSULE ORAL at 02:11

## 2022-11-27 RX ADMIN — METOPROLOL TARTRATE 25 MG: 25 TABLET, FILM COATED ORAL at 09:11

## 2022-11-27 RX ADMIN — ACETAMINOPHEN 650 MG: 325 TABLET ORAL at 09:11

## 2022-11-27 RX ADMIN — Medication 70 MG: at 06:11

## 2022-11-27 RX ADMIN — ACETAMINOPHEN 650 MG: 325 TABLET ORAL at 06:11

## 2022-11-27 RX ADMIN — ACETAMINOPHEN 1000 MG: 500 TABLET ORAL at 02:11

## 2022-11-27 RX ADMIN — MENTHOL, METHYL SALICYLATE: 10; 15 CREAM TOPICAL at 09:11

## 2022-11-27 RX ADMIN — METHOCARBAMOL 1000 MG: 500 TABLET ORAL at 06:11

## 2022-11-27 RX ADMIN — FLUOXETINE 40 MG: 10 CAPSULE ORAL at 09:11

## 2022-11-27 NOTE — PT/OT/SLP PROGRESS
Occupational Therapy   Treatment    Name: Manisha Wynne  MRN: [id number]  Admit Date: 11/23/2022  Admitting Diagnosis:  Cervical myelopathy    General Precautions: Standard, fall   Orthopedic Precautions:spinal precautions   Braces: Aspen collar     Recommendations:     Discharge Recommendations: home health OT  Level of Assistance Recommended at Discharge: Intermittent supervision  Discharge Equipment Recommendations:  3-in-1 commode, bath bench, wheelchair, walker, rolling  Barriers to discharge:  Inaccessible home environment    Assessment:     Manisha Wynne is a 72 y.o. female with a medical diagnosis of Cervical myelopathy .  She presents with performance deficits affecting function are weakness, impaired self care skills, impaired balance, decreased coordination, decreased safety awareness, decreased upper extremity function, decreased lower extremity function, orthopedic precautions, impaired cardiopulmonary response to activity, impaired functional mobility, gait instability, impaired endurance.  Pt tolerated session well and without incident, but she continues to require assistance to perform self-care tasks and mobility.  She would continue to benefit from skilled OT services at SNF to maximize her gains in functional independence.      Rehab Potential is good    Activity tolerance:  Good    Plan:     Patient to be seen 5 x/week to address the above listed problems via self-care/home management, therapeutic activities, therapeutic exercises    Plan of Care Expires: 12/24/22  Plan of Care Reviewed with: patient    Subjective     Communicated with: nsg prior to session. Pt agreeable to therapy session .    Pain/Comfort:  Pain Rating 1: 0/10  Pain Rating Post-Intervention 1: 0/10    Patient's cultural, spiritual, Sabianist conflicts given the current situation:  no    Objective:     Patient found supine with cervical collar upon OT entry to room.    Bed Mobility:    Patient completed  Rolling/Turning to Left with  stand by assistance  Patient completed Scooting/Bridging with stand by assistance  Patient completed Supine to Sit with stand by assistance     Functional Mobility/Transfers:  Patient completed Sit <> Stand Transfer with CGA  with  rolling walker   Patient completed Bed <> Chair Transfer using Stand Pivot technique with contact guard assistance with rolling walker    Activities of Daily Living:  Grooming: pt performed face washing, oral hygiene and hair grooming with supervision while seated in w/c at sink   Upper Body Dressing: pt don/doff shirt with SBA seated in w/c  Lower Body Dressing: pt don/doff pants with SBA using reacher while seated at EOB and and standing with RW to pull pants above hips    AMPA 6 Click ADL: 20    Treatment & Education:  Pt educated on AE, ADLs, functional mobility and safety awareness    Patient left up in chair with all lines intact and call button in reach    GOALS:   Multidisciplinary Problems       Occupational Therapy Goals          Problem: Occupational Therapy    Goal Priority Disciplines Outcome Interventions   Occupational Therapy Goal     OT, PT/OT Ongoing, Progressing    Description: Goals to be met by: 12/24/22     Patient will increase functional independence with ADLs by performing:    UE Dressing with Modified [place].  LE Dressing with Modified [place].  Grooming while standing at sink with Modified [place].  Toileting from toilet with Modified [place] for hygiene and clothing management.   Bathing from  shower chair/bench with Supervision.  Step transfer with Modified [place]  Upper extremity exercise program with supervision.  Caregiver will be educated on level of assistance required to safely perform self care and functional transfers.                         Time Tracking:     OT Date of Treatment: 11/27/22  OT Start Time: 0748    OT Stop Time: 0812  OT Total Time (min): 24 min    Billable Minutes:Self  Care/Home Management 20  Therapeutic Activity 4    11/27/2022

## 2022-11-28 LAB
ANION GAP SERPL CALC-SCNC: 11 MMOL/L (ref 8–16)
BASOPHILS # BLD AUTO: 0.05 K/UL (ref 0–0.2)
BASOPHILS NFR BLD: 0.7 % (ref 0–1.9)
BUN SERPL-MCNC: 16 MG/DL (ref 8–23)
CALCIUM SERPL-MCNC: 9.5 MG/DL (ref 8.7–10.5)
CHLORIDE SERPL-SCNC: 107 MMOL/L (ref 95–110)
CO2 SERPL-SCNC: 21 MMOL/L (ref 23–29)
CREAT SERPL-MCNC: 0.9 MG/DL (ref 0.5–1.4)
DIFFERENTIAL METHOD: ABNORMAL
EOSINOPHIL # BLD AUTO: 0.3 K/UL (ref 0–0.5)
EOSINOPHIL NFR BLD: 4.2 % (ref 0–8)
ERYTHROCYTE [DISTWIDTH] IN BLOOD BY AUTOMATED COUNT: 14.1 % (ref 11.5–14.5)
EST. GFR  (NO RACE VARIABLE): >60 ML/MIN/1.73 M^2
GLUCOSE SERPL-MCNC: 94 MG/DL (ref 70–110)
HCT VFR BLD AUTO: 29.7 % (ref 37–48.5)
HGB BLD-MCNC: 9.6 G/DL (ref 12–16)
IMM GRANULOCYTES # BLD AUTO: 0.13 K/UL (ref 0–0.04)
IMM GRANULOCYTES NFR BLD AUTO: 1.8 % (ref 0–0.5)
LYMPHOCYTES # BLD AUTO: 1.9 K/UL (ref 1–4.8)
LYMPHOCYTES NFR BLD: 26 % (ref 18–48)
MAGNESIUM SERPL-MCNC: 2 MG/DL (ref 1.6–2.6)
MCH RBC QN AUTO: 31 PG (ref 27–31)
MCHC RBC AUTO-ENTMCNC: 32.3 G/DL (ref 32–36)
MCV RBC AUTO: 96 FL (ref 82–98)
MONOCYTES # BLD AUTO: 0.9 K/UL (ref 0.3–1)
MONOCYTES NFR BLD: 12.5 % (ref 4–15)
NEUTROPHILS # BLD AUTO: 4 K/UL (ref 1.8–7.7)
NEUTROPHILS NFR BLD: 54.8 % (ref 38–73)
NRBC BLD-RTO: 0 /100 WBC
PHOSPHATE SERPL-MCNC: 4.8 MG/DL (ref 2.7–4.5)
PLATELET # BLD AUTO: 274 K/UL (ref 150–450)
PMV BLD AUTO: 9.4 FL (ref 9.2–12.9)
POTASSIUM SERPL-SCNC: 4.7 MMOL/L (ref 3.5–5.1)
RBC # BLD AUTO: 3.1 M/UL (ref 4–5.4)
SODIUM SERPL-SCNC: 139 MMOL/L (ref 136–145)
WBC # BLD AUTO: 7.3 K/UL (ref 3.9–12.7)

## 2022-11-28 PROCEDURE — 25000003 PHARM REV CODE 250: Performed by: HOSPITALIST

## 2022-11-28 PROCEDURE — 25000242 PHARM REV CODE 250 ALT 637 W/ HCPCS: Performed by: HOSPITALIST

## 2022-11-28 PROCEDURE — 80048 BASIC METABOLIC PNL TOTAL CA: CPT | Performed by: HOSPITALIST

## 2022-11-28 PROCEDURE — 85025 COMPLETE CBC W/AUTO DIFF WBC: CPT | Performed by: HOSPITALIST

## 2022-11-28 PROCEDURE — 11000004 HC SNF PRIVATE

## 2022-11-28 PROCEDURE — 97116 GAIT TRAINING THERAPY: CPT | Mod: CQ

## 2022-11-28 PROCEDURE — 97110 THERAPEUTIC EXERCISES: CPT | Mod: CO

## 2022-11-28 PROCEDURE — 97530 THERAPEUTIC ACTIVITIES: CPT | Mod: CQ

## 2022-11-28 PROCEDURE — 36415 COLL VENOUS BLD VENIPUNCTURE: CPT | Performed by: HOSPITALIST

## 2022-11-28 PROCEDURE — 83735 ASSAY OF MAGNESIUM: CPT | Performed by: HOSPITALIST

## 2022-11-28 PROCEDURE — 97530 THERAPEUTIC ACTIVITIES: CPT | Mod: CO

## 2022-11-28 PROCEDURE — 84100 ASSAY OF PHOSPHORUS: CPT | Performed by: HOSPITALIST

## 2022-11-28 RX ADMIN — METHOCARBAMOL 1000 MG: 500 TABLET ORAL at 02:11

## 2022-11-28 RX ADMIN — ACETAMINOPHEN 1000 MG: 500 TABLET ORAL at 05:11

## 2022-11-28 RX ADMIN — FLUOXETINE 40 MG: 10 CAPSULE ORAL at 09:11

## 2022-11-28 RX ADMIN — METHOCARBAMOL 1000 MG: 500 TABLET ORAL at 09:11

## 2022-11-28 RX ADMIN — GABAPENTIN 100 MG: 100 CAPSULE ORAL at 02:11

## 2022-11-28 RX ADMIN — MENTHOL, METHYL SALICYLATE: 10; 15 CREAM TOPICAL at 09:11

## 2022-11-28 RX ADMIN — ACETAMINOPHEN 1000 MG: 500 TABLET ORAL at 09:11

## 2022-11-28 RX ADMIN — Medication 1 TABLET: at 09:11

## 2022-11-28 RX ADMIN — METOPROLOL TARTRATE 25 MG: 25 TABLET, FILM COATED ORAL at 09:11

## 2022-11-28 RX ADMIN — GABAPENTIN 100 MG: 100 CAPSULE ORAL at 09:11

## 2022-11-28 RX ADMIN — ACETAMINOPHEN 1000 MG: 500 TABLET ORAL at 01:11

## 2022-11-28 RX ADMIN — METHOCARBAMOL 1000 MG: 500 TABLET ORAL at 05:11

## 2022-11-28 RX ADMIN — LOSARTAN POTASSIUM 25 MG: 25 TABLET, FILM COATED ORAL at 09:11

## 2022-11-28 RX ADMIN — Medication 70 MG: at 07:11

## 2022-11-28 RX ADMIN — Medication 6 MG: at 09:11

## 2022-11-28 RX ADMIN — PRAVASTATIN SODIUM 20 MG: 20 TABLET ORAL at 09:11

## 2022-11-28 RX ADMIN — BUPROPION HYDROCHLORIDE 300 MG: 300 TABLET, FILM COATED, EXTENDED RELEASE ORAL at 09:11

## 2022-11-28 RX ADMIN — CHOLECALCIFEROL TAB 125 MCG (5000 UNIT) 5000 UNITS: 125 TAB at 09:11

## 2022-11-28 NOTE — CLINICAL REVIEW
Clinical Pharmacy Chart Review Note      Admit Date: 11/23/2022   LOS: 5 days       Manisha Wynne is a 72 y.o. female admitted to SNF for PT/OT after hospitalization for cervical myelopathy.    Active Hospital Problems    Diagnosis  POA    *Cervical myelopathy [G95.9]  Yes    Mixed stress and urge incontinence [N39.46]  Yes    Goiter, nontoxic, multinodular [E04.2]  Yes    Other osteoporosis without current pathological fracture [M81.8]  Yes    Primary hyperparathyroidism [E21.0]  Yes    CKD (chronic kidney disease) stage 3, GFR 30-59 ml/min [N18.30]  Yes    CEDRIC (obstructive sleep apnea) [G47.33]  Yes    Lumbar radiculopathy [M54.16]  Yes    Cardiac pacemaker in situ [Z95.0]  Yes    Dyslipidemia [E78.5]  Yes    Hypertension [I10]  Yes     Chronic    Atrial fibrillation [I48.91]  Yes     Suspected-- so far no documentation      Depression [F32.A]  Yes    SSS (sick sinus syndrome) [I49.5]  Yes     Chronic     Suspected-- so far no documentation        Resolved Hospital Problems   No resolved problems to display.     Review of patient's allergies indicates:  No Known Allergies  Patient Active Problem List    Diagnosis Date Noted    Nuclear sclerotic cataract of left eye 05/16/2022    Elevated liver enzymes 03/22/2022    Lack of coordination 12/10/2021    Muscle weakness 12/10/2021    At risk for falls 11/18/2021    Mixed stress and urge incontinence 11/17/2021    Urinary frequency 11/17/2021    Vaginal atrophy 11/17/2021    Nocturia more than twice per night 11/17/2021    History of chronic constipation 11/17/2021    Goiter, nontoxic, multinodular 10/07/2021    Diarrhea 07/12/2021    Recurrent Clostridioides difficile diarrhea 07/12/2021    Cervical myelopathy 05/06/2021    Back pain 05/04/2021    Cerumen debris on tympanic membrane of both ears 09/15/2020    Sensorineural hearing loss (SNHL) of both ears 09/15/2020    Wrist pain, left 02/19/2020    Decreased  strength of left hand 02/19/2020    Range of  motion deficit 02/19/2020    Decreased pinch strength 02/19/2020    Closed fracture of left distal radius 01/23/2020    Nasal deformity 12/19/2019    Sensory peripheral neuropathy 08/21/2019    Atrophic pancreas 07/05/2019    Primary hyperparathyroidism 07/03/2019    Other osteoporosis without current pathological fracture 07/03/2019    CKD (chronic kidney disease) stage 3, GFR 30-59 ml/min 01/17/2019    RBBB 07/29/2018    CEDRIC (obstructive sleep apnea)     Sacroiliac joint dysfunction 05/27/2015    Lumbar spinal stenosis 03/31/2015    Lumbar radiculopathy 03/31/2015    Facet arthritis of lumbar region 03/31/2015    Abnormal Pap smear of cervix 10/01/2013    Cardiac pacemaker in situ 11/09/2012    Dyslipidemia     AV block, 1st degree 07/25/2012    Depression 07/24/2012    Hypertension 07/24/2012    Atrial fibrillation 07/24/2012    SSS (sick sinus syndrome) 07/24/2012       Scheduled Meds:    acetaminophen  1,000 mg Oral Q8H    buPROPion  300 mg Oral Daily    calcium-vitamin D3  1 tablet Oral Daily    cholecalciferol (vitamin D3)  5,000 Units Oral Daily    FLUoxetine  40 mg Oral Daily    gabapentin  100 mg Oral TID    lisdexamfetamine  70 mg Oral QAM    losartan  25 mg Oral Daily    methocarbamoL  1,000 mg Oral Q8H    methyl salicylate-menthol 15-10%   Topical (Top) QHS    metoprolol tartrate  25 mg Oral BID    pravastatin  20 mg Oral Daily    senna-docusate 8.6-50 mg  1 tablet Oral BID     Continuous Infusions:   PRN Meds: acetaminophen, calcium carbonate, dicyclomine, melatonin, oxyCODONE    OBJECTIVE:     Vital Signs (Last 24H)  Temp:  [98 °F (36.7 °C)]   Pulse:  [60]   Resp:  [18]   BP: (153)/(68)   SpO2:  [98 %]     Laboratory:  CBC:   Recent Labs   Lab 11/23/22  0539 11/24/22  1205 11/28/22  0504   WBC 7.03 6.42 7.30   RBC 3.07* 3.11* 3.10*   HGB 9.3* 9.6* 9.6*   HCT 30.0* 29.5* 29.7*    226 274   MCV 98 95 96   MCH 30.3 30.9 31.0   MCHC 31.0* 32.5 32.3     BMP:   Recent Labs   Lab 11/22/22  0529  11/23/22  0539 11/23/22  0920 11/24/22  1205   GLU 99 97  --  98   * 134*  --  139   K 4.6 5.4* 4.5 5.1    104  --  106   CO2 20* 23  --  24   BUN 14 14  --  11   CREATININE 0.8 0.9  --  0.8   CALCIUM 9.0 8.9  --  9.1   MG  --   --   --  2.2     CMP:   Recent Labs   Lab 11/22/22  0529 11/23/22  0539 11/23/22  0920 11/24/22  1205   GLU 99 97  --  98   CALCIUM 9.0 8.9  --  9.1   * 134*  --  139   K 4.6 5.4* 4.5 5.1   CO2 20* 23  --  24    104  --  106   BUN 14 14  --  11   CREATININE 0.8 0.9  --  0.8     Lab Results   Component Value Date    ALT 30 10/27/2022    AST 26 10/27/2022    GGT 63 (H) 07/13/2021    ALKPHOS 57 10/27/2022    BILITOT 1.2 (H) 10/27/2022           ASSESSMENT/PLAN:     I have reviewed the medications in compliance with CMS Regulation F756 of the [place]. Based on information gathered, the following items may need to be addressed:    **According to PMH and home medication list, patient takes the following medications at home. These medications are not currently ordered at [facility]:  Estrace 1 gm vaginally twice a week  Prolia 60 mg every 6 months (non-formulary)    **Patient is taking bupropion and fluoxetine (home meds) for depression. A gradual dose reduction is not recommended at this time. Patient to follow-up with prescribing MD as outpatient.  Monitor: mental status for depression, suicide ideation, anxiety, serotonin syndrome, hyponatremia, dizziness, drowsiness, somnolence      Medications reviewed by PharmD, please re-consult if needed.      Svetlana Worthy, Pharm. D.  Clinical Pharmacist  [facility]

## 2022-11-28 NOTE — PLAN OF CARE
Problem: Occupational Therapy  Goal: Occupational Therapy Goal  Description: Goals to be met by: 12/24/22     Patient will increase functional independence with ADLs by performing:    UE Dressing with Modified [place].  LE Dressing with Modified [place].  Grooming while standing at sink with Modified [place].  Toileting from toilet with Modified [place] for hygiene and clothing management.   Bathing from  shower chair/bench with Supervision.  Step transfer with Modified [place]  Upper extremity exercise program with supervision.  Caregiver will be educated on level of assistance required to safely perform self care and functional transfers.    Outcome: Ongoing, Progressing

## 2022-11-28 NOTE — PT/OT/SLP PROGRESS
"Physical Therapy Treatment    Patient Name:  Manisha Wynne   MRN:  [id number]  Admit Date: 11/23/2022  Admitting Diagnosis: Cervical myelopathy    General Precautions: Standard, fall   Orthopedic Precautions:spinal precautions   Braces: Aspen collar     Recommendations:     Discharge Recommendations:  home health PT   Level of Assistance Recommended at Discharge: Intermittent assistance   Discharge Equipment Recommendations: bedside commode, bath bench, wheelchair, walker, rolling   Barriers to discharge: Inaccessible home environment, Decreased caregiver support    Assessment:     Manisha Wynne is a 72 y.o. female admitted with a medical diagnosis of Cervical myelopathy .     Pt was agreeable and tolerated therapy session well. Pt required supervision for mobility, CGA for gait, CGA for curb, and CGA-Jeff for stairs. Pt required occasional verbal cues for safety. Pt is progressing well and continues to benefit from therapy to improve functional endurance, strength, and mobility.     Performance deficits affecting function:  weakness, impaired endurance, impaired self care skills, impaired functional mobility, gait instability, impaired balance, decreased upper extremity function, decreased lower extremity function, impaired cardiopulmonary response to activity, orthopedic precautions .    Rehab Potential is good    Activity Tolerance: Good    Plan:     Patient to be seen 6 x/week to address the above listed problems via gait training, therapeutic activities, therapeutic exercises, neuromuscular re-education, wheelchair management/training    Plan of Care Expires: 12/25/22  Plan of Care Reviewed with: patient    Subjective     "I can get out of my on my own".     Pain/Comfort:  Pain Rating 1:  (did not rate)  Location - Orientation 1: posterior  Location 1: neck  Pain Addressed 1: Reposition, Distraction  Pain Rating Post-Intervention 1:  (did not rate)    Patient's cultural, spiritual, Hindu " "conflicts given the current situation:  no    Objective:     Patient found HOB elevated with cervical collar upon PT entry to room.     Therapeutic Activities and Exercises:   Pt completed 2 trials of reaching for soap bottle from the floor with RW and Jeff.   Patient educated on role of therapy, goals of session, and benefits of out of bed mobility.   Instructed on use of call button and importance of calling nursing staff for assistance with mobility   Questions/concerns addressed within PTA scope of practice  Pt verbalized understanding.  Whiteboard Updated    Functional Mobility:  Bed Mobility:     Rolling Left:  supervision no bed rail   Rolling Right: supervision  Supine to Sit: supervision with HOB flat   Sit to Supine: supervision  Transfers:     Sit to Stand:  contact guard assistance and minimum assistance with rolling walker  Initial sit>stand was Jeff, then remaining was CGA  1x from EOB and multiple from w/c  Bed to Chair: contact guard assistance with  rolling walker  using  Stand Pivot  Gait: pt ambulated ~240ft with RW and CGA and wheelchair follwoing. Mostly steady gait with slight flexed posture, downward gaze, and decrease step length   Verbal cues for smaller movement with turning and gaze direction   No rest break and no B noted.   Curb: Pt ascended/descended 4" curb step with Rolling Walker with Contact Guard Assistance.   Verbal cues for sequencing.   Stairs: Pt ascended/descended 3x 4 steps (3 consecutive trials) with CGA-Jeff.   1 trial with BHR, 2nd and 3rd trial with 1 HR  Jeff when descending when using 1 HR, increase anxiousness noted as pt is unable to look down d/t collar.  Wheelchair Propulsion:  Pt propelled Standard wheelchair x ~100 feet on Level tile with  Bilateral upper extremity with Supervision or Set-up Assistance.    AM-PAC 6 CLICK MOBILITY  20    Patient left up in chair with call button in reach.    GOALS:   Multidisciplinary Problems       Physical Therapy Goals          " Problem: Physical Therapy    Goal Priority Disciplines Outcome Goal Variances Interventions   Physical Therapy Goal     PT, PT/OT Ongoing, Progressing     Description: Goals to be met by: 12/25/22    Patient will increase functional independence with mobility by performing:    . Supine to sit with Modified [place]  . Sit to supine with Modified [place]  . Rolling to Left and Right with Modified [place].  . Sit to stand transfer with Supervision  . Bed to chair transfer with Supervision using Rolling Walker/no AD  . Gait  x 150 feet with Supervision using Rolling Walker.   . Wheelchair propulsion x150 feet with Modified [place] using bilateral uppper extremities  . Ascend/descend 12 stair with bilateral Handrails Supervision using No Assistive Device.   . Ascend/Descend 4 inch curb step with Supervision using Rolling Walker.                         Time Tracking:     PT Received On: 11/28/22  PT Start Time: 1025  PT Stop Time: 1105  PT Total Time (min): 40 min    Billable Minutes: Gait Training 15 and Therapeutic Activity 25    Treatment Type: Treatment  PT/PTA: PTA     PTA Visit Number: 2     11/28/2022

## 2022-11-28 NOTE — PT/OT/SLP PROGRESS
Occupational Therapy   Treatment    Name: Manisha Wynne  MRN: [id number]  Admit Date: 11/23/2022  Admitting Diagnosis:  Cervical myelopathy    General Precautions: Standard, fall   Orthopedic Precautions:spinal precautions   Braces: Aspen collar     Recommendations:     Discharge Recommendations: home health OT  Level of Assistance Recommended at Discharge: Intermittent assistance for ADL's and homemaking tasks  Discharge Equipment Recommendations:  3-in-1 commode, bath bench, wheelchair, walker, rolling  Barriers to discharge:  Inaccessible home environment    Assessment:     Manisha Wynne is a 72 y.o. female with a medical diagnosis of Cervical myelopathy.  She presents with limitations in performance of self-care, functional mobility, and ADLs. Performance deficits affecting function are weakness, impaired self care skills, impaired balance, decreased coordination, decreased safety awareness, decreased upper extremity function, decreased lower extremity function, orthopedic precautions, impaired cardiopulmonary response to activity, impaired functional mobility, gait instability, impaired endurance.     Rehab Potential is good    Activity tolerance:  Good    Plan:     Patient to be seen 5 x/week to address the above listed problems via self-care/home management, therapeutic activities, therapeutic exercises    Plan of Care Expires: 12/24/22  Plan of Care Reviewed with: patient    Subjective     Communicated with: Nurse prior to session.    Pain/Comfort:  Pain Rating 1:  (did not rate)  Location - Side 1: Bilateral  Location - Orientation 1: generalized  Location 1: neck  Pain Addressed 1: Pre-medicate for activity, Reposition, Distraction  Pain Rating Post-Intervention 1: 6/10    Patient's cultural, spiritual, Amish conflicts given the current situation:  no    Objective:     Patient found  up in recliner chair  with cervical collar upon OT entry to room.    Bed Mobility:    Patient completed  Sit to Supine with stand by assistance     Functional Mobility/Transfers:  Patient completed Sit <> Stand Transfer x3 with stand by assistance  with  rolling walker   Patient completed Recliner Chair <> Wheel Chair Step Transfer technique with contact guard assistance with rolling walker  Patient completed Wheel Chair <> Bed Step Transfer technique with contact guard assistance with rolling walker  Functional Mobility: Pt propelled W/C from room to therapy gym with sup for a total of 100 ft.      AMPA 6 Click ADL: 20    OT Exercises: Pt performed UBE exercise for 12  minutes with Mod resistance.  UE exercises performed to increase functional endurance and strength in order increase independence when performing self care tasks, functional ambulation, W/C propulsion, and functional standing activities .    Treatment & Education:  Pt participated in standing activity with sup and RW. Pt at raised counter to perform fine motor and visual scanning activity with focus on standing tolerance, functional reaching, dynamic standing bal, crossing midline, and to promote independence with homemaking and self care tasks. Pt tolerated standing for 11 min and 29 sec     Pt educated on role of OT, safety while performing functional transfers, safety while performing self care tasks, orthopedic precautions, importance to adhering to precautions and progress towards OT goals    Patient left HOB elevated with call button in reach    GOALS:   Multidisciplinary Problems       Occupational Therapy Goals          Problem: Occupational Therapy    Goal Priority Disciplines Outcome Interventions   Occupational Therapy Goal     OT, PT/OT Ongoing, Progressing    Description: Goals to be met by: 12/24/22     Patient will increase functional independence with ADLs by performing:    UE Dressing with Modified [place].  LE Dressing with Modified [place].  Grooming while standing at sink with Modified [place].  Toileting from toilet  with Modified [place] for hygiene and clothing management.   Bathing from  shower chair/bench with Supervision.  Step transfer with Modified [place]  Upper extremity exercise program with supervision.  Caregiver will be educated on level of assistance required to safely perform self care and functional transfers.                         Time Tracking:     OT Date of Treatment: 11/28/22  OT Start Time: 1441    OT Stop Time: 1528  OT Total Time (min): 47 min    Billable Minutes:Therapeutic Activity 32  Therapeutic Exercise 15    11/28/2022  A client care conference was performed between the LOTR and FIG[facility], prior to treatment by [facility], to discuss the patient's status, treatment plan and established goals.

## 2022-11-28 NOTE — PROGRESS NOTES
[facility]                                  Skilled Nursing Facility                   Progress Note     Patient Name: Manisha Wynne  YOB: 1950  MRN: [id number]  Room: [place] [address] [address] A     Admit Date: 11/23/2022   CARO:      Principal Problem: Cervical myelopathy    HPI obtained from patient interview and chart review     Chief Complaint:   Establish Care/ Initial Visit        HPI:   Manisha Wynne is a 72 y.o. female with PMH of cervical stenosis, myelopathy, and hypertension. She has an MRI-non-compatible pacemaker in place. s/p C3-C6 posterior cervical fusion on 11/14 by Dr. Cheek.     Patient will be treated at [facility] with PT and OT to improve functional status and ability to perform ADLs.     Interval History  All of today's labs reviewed and are listed below.  24 hr vital sign ranges listed below.  Patient denies shortness of breath, abdominal discomfort, nausea, or vomiting.  Patient reports an adequate appetite.  Patient denies dysuria.  Patient reports having regular bowel movements.  Patient progessing with PT/OT. Continuing to follow and treat all acute and chronic conditions.    Past Medical History: Patient has a past medical history of Abnormal Pap smear, Atrial fibrillation, AV block, 1st degree (07/25/2012), C. difficile diarrhea, Cataract, Depression (07/24/2012), Facet arthritis of lumbar region (03/31/2015), Falls, Hyperlipidemia, Hypertension (07/24/2012), Neuropathy, Other specified cardiac dysrhythmias(427.89), Sleep apnea, Syncope (07/24/2012), and Tremors of nervous system.    Past Surgical History: Patient has a past surgical history that includes Tonsillectomy; Dilation and curettage of uterus; Cardiac pacemaker placement (09/07/2012); Endoscopic ultrasound of upper gastrointestinal tract (N/A, 7/5/2019); [place] tooth extraction; Nasal septoplasty (N/A, 12/19/2019); Application of  cartilage graft (Bilateral, 12/19/2019); Surgical removal of nasal turbinate (Bilateral, 12/19/2019); Open reduction and internal fixation (ORIF) of fracture of distal radius (Left, 1/24/2020); Myelography (N/A, 5/4/2021); Cataract extraction w/  intraocular lens implant (Right, 5/16/2022); Cataract extraction w/  intraocular lens implant (Left, 6/20/2022); Replacement of pacemaker generator (Left, 10/7/2022); and Posterior fusion of cervical spine with laminectomy (N/A, 11/14/2022).    Social History: Patient reports that she quit smoking about 40 years ago. Her smoking use included cigarettes. She has a 3.75 pack-year smoking history. She has quit using smokeless tobacco. She reports current alcohol use. She reports that she does not use drugs.    Family History:  family history is not on file. She was adopted.    Allergies: Patient has No Known Allergies.        Review of Systems   Constitutional:  Positive for malaise/fatigue. Negative for chills and fever.   HENT:  Negative for congestion, hearing loss and sore throat.    Eyes:  Negative for blurred vision and double vision.   Respiratory:  Negative for cough, sputum production, shortness of breath and wheezing.    Cardiovascular:  Negative for chest pain, palpitations and leg swelling.   Gastrointestinal:  Negative for abdominal pain, constipation, diarrhea, heartburn, nausea and vomiting.   Genitourinary:  Negative for dysuria and urgency.   Musculoskeletal:  Negative for back pain.   Skin:  Negative for rash.        + wound   Neurological:  Positive for weakness. Negative for dizziness and headaches.   Endo/Heme/Allergies:  Negative for polydipsia. Does not bruise/bleed easily.   Psychiatric/Behavioral:  Negative for depression and hallucinations. The patient is not nervous/anxious.        24 hour Vital Sign Range   Temp:  [97.9 °F (36.6 °C)-98 °F (36.7 °C)]   Pulse:  [60-64]   Resp:  [18]   BP: (114-153)/(57-68)   SpO2:  [98 %]       Physical Exam  Vitals  and nursing note reviewed.   Constitutional:       General: She is not in acute distress.     Appearance: Normal appearance. She is not ill-appearing.   HENT:      Head: Normocephalic and atraumatic.      Nose: Nose normal. No congestion.      Mouth/Throat:      Mouth: Mucous membranes are moist.      Pharynx: Oropharynx is clear.   Eyes:      Extraocular Movements: Extraocular movements intact.      Conjunctiva/sclera: Conjunctivae normal.      Pupils: Pupils are equal, round, and reactive to light.   Neck:      Comments: C-collar in place    Cardiovascular:      Rate and Rhythm: Normal rate and regular rhythm.      Pulses: Normal pulses.      Heart sounds: Normal heart sounds. No murmur heard.  Pulmonary:      Effort: Pulmonary effort is normal. No respiratory distress.      Breath sounds: Normal breath sounds. No wheezing or rhonchi.   Abdominal:      General: Bowel sounds are normal. There is no distension.      Palpations: Abdomen is soft. There is no mass.      Tenderness: There is no abdominal tenderness.   Musculoskeletal:         General: No swelling or tenderness.      Cervical back: Normal range of motion and neck supple. No tenderness.      Right lower leg: No edema.      Left lower leg: No edema.   Skin:     General: Skin is warm and dry.      Capillary Refill: Capillary refill takes less than 2 seconds.      Findings: No erythema or rash.   Neurological:      Mental Status: She is alert and oriented to person, place, and time. Mental status is at baseline.      Motor: Weakness present.   Psychiatric:         Mood and Affect: Mood normal.         Behavior: Behavior normal.         Thought Content: Thought content normal.         Judgment: Judgment normal.     Full skin assessment completed by this NP           Incision/Site 06/25/15 1053 Left back (Active)   06/25/15 1053   Present Prior to Hospital Arrival?:    Side: Left   Location: back   Orientation:    Incision Type:    Closure Method:     Additional Comments:    Removal Indication and Assessment:    Wound Outcome:    Removal Indications:    Number of days: 2714            Incision/Site 11/14/22 1813 Neck (Active)   11/14/22 1813   Present Prior to Hospital Arrival?:    Side:    Location: Neck   Orientation:    Incision Type:    Closure Method:    Additional Comments:    Removal Indication and Assessment:    Wound Outcome:    Removal Indications:    Incision WDL ex 11/28/22 0950   Dressing Appearance Open to air 11/28/22 2126   Drainage Amount None 11/28/22 2126   Drainage Characteristics/Odor No odor 11/28/22 2126   Appearance Intact;Steri-strips intact 11/28/22 2126   Periwound Area Intact;Dry;Pink;Edematous 11/28/22 2126   Wound Edges Approximated 11/28/22 2126   Dressing Other (comment) 11/26/22 1001   Number of days: 15          Labs:  Recent Labs   Lab 11/23/22  0539 11/24/22  1205 11/28/22  0504   WBC 7.03 6.42 7.30   HGB 9.3* 9.6* 9.6*   HCT 30.0* 29.5* 29.7*    226 274     Recent Labs   Lab 11/23/22  0539 11/23/22  0920 11/24/22  1205 11/28/22  0504   *  --  139 139   K 5.4* 4.5 5.1 4.7     --  106 107   CO2 23  --  24 21*   BUN 14  --  11 16   CREATININE 0.9  --  0.8 0.9   GLU 97  --  98 94   CALCIUM 8.9  --  9.1 9.5   MG  --   --  2.2 2.0   PHOS  --   --  2.8 4.8*     No results for input(s): ALKPHOS, ALT, AST, ALBUMIN, PROT, BILITOT, INR in the last 168 hours.  No results for input(s): POCTGLUCOSE in the last 72 hours.    Meds Scheduled:   acetaminophen  1,000 mg Oral Q8H    buPROPion  300 mg Oral Daily    calcium-vitamin D3  1 tablet Oral Daily    cholecalciferol (vitamin D3)  5,000 Units Oral Daily    FLUoxetine  40 mg Oral Daily    gabapentin  100 mg Oral TID    lisdexamfetamine  70 mg Oral QAM    losartan  25 mg Oral Daily    methocarbamoL  1,000 mg Oral Q8H    methyl salicylate-menthol 15-10%   Topical (Top) QHS    metoprolol tartrate  25 mg Oral BID    pravastatin  20 mg Oral Daily    senna-docusate 8.6-50 mg  1  tablet Oral BID       PRN:   acetaminophen, calcium carbonate, dicyclomine, melatonin, oxyCODONE      Assessment and Plan:    Cervical myelopathy  cervical myelopathy s/p C3-C6 posterior cervical fusion on 11/14 by Dr. Cheek    - HELEN collar to be worn when upright and OOB  - Pain control- added oxy 10 q 4 hours prn severe pain. Increased gabapentin to 200 mg tid. Continue scheduled robaxin and tylenol.   - Bowel regimen: senna BID and miralax daily. 11/28 - -Atelectasis prevention: IS hourly while awake, PT/OT, OOB > 6 hours per day     Hypertension   continue home meds. Metoprolol 25mg, Losartan 25mg    Anticipate disposition:    Home with home health      Follow-up needed during SNF admission:       Follow-up needed after discharge from SNF:   - PCP within 1-2 weeks  - See appt scheduled below     Future Appointments   Date Time Provider Department Center   12/22/2022 10:00 AM [facility] OIC-XRAY NOM XRAY [facility]   12/22/2022 11:00 AM Nicolette Cheek MD [facility] NEUROS8 [facility]   1/5/2023 10:00 AM HOME MONITOR DEVICE CHECK, [facility] ARRHPRO [facility]   1/10/2023  8:45 AM EKG, APPT [facility] EKG [facility]   1/10/2023  9:00 AM COORDINATED DEVICE CHECK [facility] ARRHPRO [facility]   1/10/2023  9:30 AM Svetlana Miner NP [facility] ARRHYTH [facility]   2/2/2023 11:30 AM Sunitha Rosario PA-C [facility] NEURO8 [facility]   2/10/2023 10:00 AM LAB, APPOINTMENT [facility] INTMED [facility] LAB IM [facility]   2/23/2023  9:15 AM INJECTION NOM AMB INF [facility]         I certify that SNF services are required to be given on an inpatient basis because Manisha Wynne needs for skilled nursing care and/or skilled rehabilitation are required on a daily basis and such services can only practically be provided in a skilled nursing facility setting and are for an ongoing condition for which she received inpatient care in the hospital.       Extended Visit:   Total time spent: 118 minutes [id number]  Non Face to Face Time: 85 minutes   Description of  Time: counseling provided on clinical condition, therapies provided, plan of care, emotional support, coordinating patient care with other care team members, reviewing and interpreting labs and imaging, collaboration with physician, initiating new orders, chart review, and documentation. See interval hx.         Angely De La Rosa NP  Department of Hospital Medicine   [facility]     DOS: 11/28/2022       Patient note was created using MModal Dictation.  Any errors in syntax or even information may not have been identified and edited on initial review prior to signing this note.

## 2022-11-29 PROCEDURE — 25000242 PHARM REV CODE 250 ALT 637 W/ HCPCS: Performed by: HOSPITALIST

## 2022-11-29 PROCEDURE — 97535 SELF CARE MNGMENT TRAINING: CPT | Mod: CO

## 2022-11-29 PROCEDURE — 97110 THERAPEUTIC EXERCISES: CPT

## 2022-11-29 PROCEDURE — 25000003 PHARM REV CODE 250: Performed by: HOSPITALIST

## 2022-11-29 PROCEDURE — 11000004 HC SNF PRIVATE

## 2022-11-29 PROCEDURE — 97116 GAIT TRAINING THERAPY: CPT

## 2022-11-29 RX ADMIN — GABAPENTIN 100 MG: 100 CAPSULE ORAL at 09:11

## 2022-11-29 RX ADMIN — Medication 1 TABLET: at 09:11

## 2022-11-29 RX ADMIN — Medication 6 MG: at 09:11

## 2022-11-29 RX ADMIN — METHOCARBAMOL 1000 MG: 500 TABLET ORAL at 03:11

## 2022-11-29 RX ADMIN — BUPROPION HYDROCHLORIDE 300 MG: 300 TABLET, FILM COATED, EXTENDED RELEASE ORAL at 09:11

## 2022-11-29 RX ADMIN — METHOCARBAMOL 1000 MG: 500 TABLET ORAL at 05:11

## 2022-11-29 RX ADMIN — MENTHOL, METHYL SALICYLATE: 10; 15 CREAM TOPICAL at 09:11

## 2022-11-29 RX ADMIN — ACETAMINOPHEN 1000 MG: 500 TABLET ORAL at 03:11

## 2022-11-29 RX ADMIN — Medication 70 MG: at 09:11

## 2022-11-29 RX ADMIN — PRAVASTATIN SODIUM 20 MG: 20 TABLET ORAL at 09:11

## 2022-11-29 RX ADMIN — ACETAMINOPHEN 1000 MG: 500 TABLET ORAL at 05:11

## 2022-11-29 RX ADMIN — FLUOXETINE 40 MG: 10 CAPSULE ORAL at 09:11

## 2022-11-29 RX ADMIN — METOPROLOL TARTRATE 25 MG: 25 TABLET, FILM COATED ORAL at 09:11

## 2022-11-29 RX ADMIN — GABAPENTIN 100 MG: 100 CAPSULE ORAL at 03:11

## 2022-11-29 RX ADMIN — ACETAMINOPHEN 1000 MG: 500 TABLET ORAL at 09:11

## 2022-11-29 RX ADMIN — METHOCARBAMOL 1000 MG: 500 TABLET ORAL at 09:11

## 2022-11-29 RX ADMIN — LOSARTAN POTASSIUM 25 MG: 25 TABLET, FILM COATED ORAL at 09:11

## 2022-11-29 NOTE — PT/OT/SLP PROGRESS
Occupational Therapy   Treatment    Name: Manisha Wynne  MRN: [id number]  Admit Date: 11/23/2022  Admitting Diagnosis:  Cervical myelopathy    General Precautions: Standard, fall   Orthopedic Precautions:spinal precautions   Braces: Aspen collar     Recommendations:     Discharge Recommendations: home health OT  Level of Assistance Recommended at Discharge: Intermittent assistance for ADL's and homemaking tasks  Discharge Equipment Recommendations:  3-in-1 commode, bath bench, wheelchair, walker, rolling  Barriers to discharge:  Inaccessible home environment    Assessment:     Manisha Wynne is a 72 y.o. female with a medical diagnosis of Cervical myelopathy.  She presents with limitations in performance of self-care, functional mobility, and ADLs. Performance deficits affecting function are weakness, impaired self care skills, impaired balance, decreased coordination, decreased safety awareness, decreased upper extremity function, decreased lower extremity function, orthopedic precautions, impaired cardiopulmonary response to activity, impaired functional mobility, gait instability, impaired endurance.   Pt tolerated Tx without incident and is making progress but continues to require assist to perform self care tasks, functional mobility and functional transfers. She would continue to benefit from OT intervention to further her functional (I)ce and safety.      Rehab Potential is good    Activity tolerance:  Good    Plan:     Patient to be seen 5 x/week to address the above listed problems via self-care/home management, therapeutic activities, therapeutic exercises    Plan of Care Expires: 12/24/22  Plan of Care Reviewed with: patient    Subjective     Communicated with: Nurse prior to session.    Pain/Comfort:  Pain Rating 1: 4/10  Location - Side 1: Bilateral  Location - Orientation 1: generalized  Location 1: neck  Pain Addressed 1: Pre-medicate for activity, Reposition, Distraction  Pain Rating  Post-Intervention 1: 4/10    Patient's cultural, spiritual, Mu-ism conflicts given the current situation:  no    Objective:     Patient found HOB elevated with cervical collar upon OT entry to room.    Bed Mobility:    Patient completed Scooting to EOB with stand by assistance  Patient completed Supine to Sit with stand by assistance     Functional Mobility/Transfers:  Patient completed Sit <> Stand Transfer with supervision  with  rolling walker   Patient completed Wheel Chair <> Recliner chair Step Transfer technique with stand by assistance with rolling walker  Patient completed Toilet Transfer Step Transfer technique from EOB ambulating to toilet with stand by assistance with  rolling walker  Functional Mobility: Pt ambulated around room with Stand by assistance with RW     Activities of Daily Living:  Grooming: modified independence sitting sinkside with set up  Bathing: minimum assistance to wash back secondary to precautions during seated sponge bath with CGA in standing with RW to clean slim area  Upper Body Dressing: minimum assistance to clasp bra secondary to precautions  Lower Body Dressing: stand by assistance with set up and RW for standing to manage pants  Toileting: stand by assistance with v/c to adhere to precautions during hygiene    [facility] 6 Click ADL: 20    Treatment & Education:  Pt educated on role of OT, safety while performing functional transfers, safety while performing self care tasks, orthopedic precautions, importance to adhering to precautions and progress towards OT goals    Patient left  in recliner chair  with call button in reach    GOALS:   Multidisciplinary Problems       Occupational Therapy Goals          Problem: Occupational Therapy    Goal Priority Disciplines Outcome Interventions   Occupational Therapy Goal     OT, PT/OT Ongoing, Progressing    Description: Goals to be met by: 12/24/22     Patient will increase functional independence with ADLs by performing:    UE  Dressing with Modified [place].  LE Dressing with Modified [place].  Grooming while standing at sink with Modified [place].  Toileting from toilet with Modified [place] for hygiene and clothing management.   Bathing from  shower chair/bench with Supervision.  Step transfer with Modified [place]  Upper extremity exercise program with supervision.  Caregiver will be educated on level of assistance required to safely perform self care and functional transfers.                         Time Tracking:     OT Date of Treatment: 11/29/22  OT Start Time: 0930    OT Stop Time: 1003  OT Total Time (min): 33 min    Billable Minutes:Self Care/Home Management 33    11/29/2022

## 2022-11-29 NOTE — PT/OT/SLP PROGRESS
Physical Therapy Treatment    Patient Name:  Manisha Wynne   MRN:  [id number]  Admit Date: 11/23/2022  Admitting Diagnosis: Cervical myelopathy      General Precautions: Standard, fall   Orthopedic Precautions:spinal precautions   Braces: Aspen collar     Recommendations:     Discharge Recommendations:  home health PT   Level of Assistance Recommended at Discharge: Intermittent supervision  Discharge Equipment Recommendations: bedside commode, bath bench, wheelchair, walker, rolling   Barriers to discharge: Inaccessible home environment, Decreased caregiver support    Assessment:     Manisha Wynne is a 72 y.o. female admitted with a medical diagnosis of Cervical myelopathy . Pt continues to make good functional progress compared to her initial eval. She is currently transferring with SBA and ambulating ~240ft at a time with RW with SBA. Pt will benefit from continued SNF rehab.      Performance deficits affecting function:  weakness, impaired endurance, impaired functional mobility, gait instability, impaired balance, decreased lower extremity function, impaired cardiopulmonary response to activity, orthopedic precautions .    Rehab Potential is good    Activity Tolerance: Good    Plan:     Patient to be seen 6 x/week to address the above listed problems via gait training, therapeutic activities, therapeutic exercises, neuromuscular re-education, wheelchair management/training    Plan of Care Expires: 12/25/22  Plan of Care Reviewed with: patient    Subjective     Pt. Agreeable and very motivated to participate with PT.     Pain/Comfort:  Pain Rating 1: 6/10  Location - Side 1: Bilateral  Location - Orientation 1: generalized  Location 1: neck  Pain Addressed 1: Pre-medicate for activity, Reposition, Distraction  Pain Rating Post-Intervention 1: 6/10    Patient's cultural, spiritual, Moravian conflicts given the current situation:  no    Objective:     Communicated with pt's nurse prior to session.  " Patient found up in chair with cervical collar upon PT entry to room.     Therapeutic Activities and Exercises: Nustep with load set @ 5 for 10mins to help improve pt's overall cardiovascular endurance.    Functional Mobility:  Transfers:     Sit to Stand:  stand by assistance with rolling walker  Bed to Chair: stand by assistance with  rolling walker  using  Stand Pivot  Gait: ~240ft with RW and SBA for safety. Pt with fair aron and no LOB episodes.  Stairs:  Pt ascended/descended 12 stair(s) and 4" curb step with Rolling Walker with bilateral handrails with Contact Guard Assistance.   Wheelchair Propulsion:  Pt propelled Standard wheelchair x >150 feet on Level tile with  Bilateral upper extremity with Modified Independent.     AM-PAC 6 CLICK MOBILITY  20    Patient left up in chair with call button in reach.    GOALS:   Multidisciplinary Problems       Physical Therapy Goals          Problem: Physical Therapy    Goal Priority Disciplines Outcome Goal Variances Interventions   Physical Therapy Goal     PT, PT/OT Ongoing, Progressing     Description: Goals to be met by: 12/25/22    Patient will increase functional independence with mobility by performing:    . Supine to sit with Modified [place]  . Sit to supine with Modified [place]  . Rolling to Left and Right with Modified [place].  . Sit to stand transfer with Supervision  . Bed to chair transfer with Supervision using Rolling Walker/no AD  . Gait  x 150 feet with Supervision using Rolling Walker.   . Wheelchair propulsion x150 feet with Modified [place] using bilateral uppper extremities  . Ascend/descend 12 stair with bilateral Handrails Supervision using No Assistive Device.   . Ascend/Descend 4 inch curb step with Supervision using Rolling Walker.                         Time Tracking:     PT Received On: 11/29/22  PT Start Time: 1025  PT Stop Time: 1050  PT Total Time (min): 25 min    Billable Minutes: Gait Training 15 and " Therapeutic Exercise 10    Treatment Type: Treatment  PT/PTA: PT     PTA Visit Number: 0     11/29/2022

## 2022-11-30 PROCEDURE — 25000242 PHARM REV CODE 250 ALT 637 W/ HCPCS: Performed by: HOSPITALIST

## 2022-11-30 PROCEDURE — 97530 THERAPEUTIC ACTIVITIES: CPT | Mod: CQ

## 2022-11-30 PROCEDURE — 25000003 PHARM REV CODE 250: Performed by: HOSPITALIST

## 2022-11-30 PROCEDURE — 97530 THERAPEUTIC ACTIVITIES: CPT

## 2022-11-30 PROCEDURE — 11000004 HC SNF PRIVATE

## 2022-11-30 PROCEDURE — 97116 GAIT TRAINING THERAPY: CPT | Mod: CQ

## 2022-11-30 PROCEDURE — 97535 SELF CARE MNGMENT TRAINING: CPT

## 2022-11-30 RX ADMIN — ACETAMINOPHEN 1000 MG: 500 TABLET ORAL at 06:11

## 2022-11-30 RX ADMIN — SENNOSIDES AND DOCUSATE SODIUM 1 TABLET: 50; 8.6 TABLET ORAL at 09:11

## 2022-11-30 RX ADMIN — FLUOXETINE 40 MG: 10 CAPSULE ORAL at 08:11

## 2022-11-30 RX ADMIN — METOPROLOL TARTRATE 25 MG: 25 TABLET, FILM COATED ORAL at 08:11

## 2022-11-30 RX ADMIN — GABAPENTIN 100 MG: 100 CAPSULE ORAL at 03:11

## 2022-11-30 RX ADMIN — Medication 6 MG: at 09:11

## 2022-11-30 RX ADMIN — METHOCARBAMOL 1000 MG: 500 TABLET ORAL at 09:11

## 2022-11-30 RX ADMIN — METHOCARBAMOL 1000 MG: 500 TABLET ORAL at 06:11

## 2022-11-30 RX ADMIN — ACETAMINOPHEN 1000 MG: 500 TABLET ORAL at 03:11

## 2022-11-30 RX ADMIN — METOPROLOL TARTRATE 25 MG: 25 TABLET, FILM COATED ORAL at 09:11

## 2022-11-30 RX ADMIN — Medication 1 TABLET: at 08:11

## 2022-11-30 RX ADMIN — Medication 70 MG: at 06:11

## 2022-11-30 RX ADMIN — MENTHOL, METHYL SALICYLATE: 10; 15 CREAM TOPICAL at 09:11

## 2022-11-30 RX ADMIN — GABAPENTIN 100 MG: 100 CAPSULE ORAL at 08:11

## 2022-11-30 RX ADMIN — ACETAMINOPHEN 1000 MG: 500 TABLET ORAL at 09:11

## 2022-11-30 RX ADMIN — GABAPENTIN 100 MG: 100 CAPSULE ORAL at 09:11

## 2022-11-30 RX ADMIN — METHOCARBAMOL 1000 MG: 500 TABLET ORAL at 03:11

## 2022-11-30 RX ADMIN — PRAVASTATIN SODIUM 20 MG: 20 TABLET ORAL at 08:11

## 2022-11-30 RX ADMIN — CHOLECALCIFEROL TAB 125 MCG (5000 UNIT) 5000 UNITS: 125 TAB at 08:11

## 2022-11-30 RX ADMIN — LOSARTAN POTASSIUM 25 MG: 25 TABLET, FILM COATED ORAL at 08:11

## 2022-11-30 NOTE — PT/OT/SLP PROGRESS
"Physical Therapy Treatment    Patient Name:  Manisha Wynne   MRN:  [id number]  Admit Date: 11/23/2022  Admitting Diagnosis: Cervical myelopathy    General Precautions: Standard, fall   Orthopedic Precautions:spinal precautions   Braces: Aspen collar     Recommendations:     Discharge Recommendations:  home health PT   Level of Assistance Recommended at Discharge: Intermittent assistance   Discharge Equipment Recommendations: bedside commode, bath bench, wheelchair, walker, rolling   Barriers to discharge: Inaccessible home environment, Decreased caregiver support    Assessment:     Manisha Wynne is a 72 y.o. female admitted with a medical diagnosis of Cervical myelopathy .     Pt was agreeable and tolerated session fairly well. Pt reports some soreness/pain in neck and shoulder, but able to complete all activities with occasional rest breaks. Pt completed reaching activities with reacher, SBA while seated and CGA while standing with RW. Pt is progressing well and continues to benefit from therapy to achieve highest level of independence prior to discharge.     Performance deficits affecting function:  weakness, impaired endurance, impaired functional mobility, gait instability, impaired balance, decreased lower extremity function, impaired cardiopulmonary response to activity, orthopedic precautions .    Rehab Potential is good    Activity Tolerance: Good    Plan:     Patient to be seen 6 x/week to address the above listed problems via gait training, therapeutic activities, therapeutic exercises, neuromuscular re-education, wheelchair management/training    Plan of Care Expires: 12/25/22  Plan of Care Reviewed with: patient    Subjective     "I have some soreness in my shoulders".     Pain/Comfort:  Pain Rating 1:  (did not rate)  Location - Orientation 1: posterior  Location 1: neck  Pain Addressed 1: Reposition, Distraction  Pain Rating Post-Intervention 1: 7/10    Patient's cultural, spiritual, " "Yazdanism conflicts given the current situation:  no    Objective:     Patient found HOB elevated with  (no active lines) upon PT entry to room.     Therapeutic Activities and Exercises:   Pt donned aspen collar with SBA and increase time prior to OOB mobility.   Reaching activity with reacher and rings,   Cues to get close to rings   2x 5reps seated in wheelchair   2x 5 reps standing with RW, CGA for safety  Patient educated on role of therapy, goals of session, and benefits of out of bed mobility.   Instructed on use of call button and importance of calling nursing staff for assistance with mobility   Questions/concerns addressed within PTA scope of practice  Pt verbalized understanding.  Whiteboard Updated    Functional Mobility:  Bed Mobility:   Scooting to EOB: supervision  Supine to Sit: supervision; HOB elevated  Transfers:   Sit <> Stand Transfer: contact guard assistance with rolling walker   Bed <> Chair Transfer: contact guard assistance with rolling walker using Stand Pivot technique   Toilet Transfer: close SBA with RW using step transfer  Gait:  Pt ambulated ~245 ft with CGA-close SBA and rolling walker.  Gait Pattern observed: reciprocal gait  Gait Deviation(s): decreased aron and slight decrease heel strike   Verbal/tactile cues for good posture and focus on heel striking first.   No LOB noted, multiple turns completed.   Wheelchair Propulsion:    Pt propelled Standard wheelchair 2x~120 feet on Level tile with  Bilateral upper extremity with Supervision or Set-up Assistance.   Curb:  Pt ascended/descended 4" curb step with Rolling Walker and Contact Guard Assistance.   Demonstrated good positioning and control of RW  Stairs:  Pt ascended/descended  12 steps (3 consecutive trials of 4 steps)  with Bilateral handrail with Contact Guard Assistance.   Reciprocal gait ascending and step-to gait descending    AM-PAC 6 CLICK MOBILITY  20    Patient left  up in wheelchair  with call button in " reach.    GOALS:   Multidisciplinary Problems       Physical Therapy Goals          Problem: Physical Therapy    Goal Priority Disciplines Outcome Goal Variances Interventions   Physical Therapy Goal     PT, PT/OT Ongoing, Progressing     Description: Goals to be met by: 12/25/22    Patient will increase functional independence with mobility by performing:    . Supine to sit with Modified [place]  . Sit to supine with Modified [place]  . Rolling to Left and Right with Modified [place].  . Sit to stand transfer with Supervision  . Bed to chair transfer with Supervision using Rolling Walker/no AD  . Gait  x 150 feet with Supervision using Rolling Walker.   . Wheelchair propulsion x150 feet with Modified [place] using bilateral uppper extremities  . Ascend/descend 12 stair with bilateral Handrails Supervision using No Assistive Device.   . Ascend/Descend 4 inch curb step with Supervision using Rolling Walker.                         Time Tracking:     PT Received On: 11/30/22  PT Start Time: 0859  PT Stop Time: 0940  PT Total Time (min): 41 min    Billable Minutes: Gait Training 16 and Therapeutic Activity 25    Treatment Type: Treatment  PT/PTA: PTA     PTA Visit Number: 1     11/30/2022

## 2022-11-30 NOTE — PT/OT/SLP PROGRESS
"Occupational Therapy   Treatment    Name: Manisha Wynne  MRN: [id number]  Admit Date: 11/23/2022  Admitting Diagnosis:  Cervical myelopathy    General Precautions: Standard, fall   Orthopedic Precautions:spinal precautions   Braces: Aspen collar     Recommendations:     Discharge Recommendations: home health OT  Level of Assistance Recommended at Discharge: Intermittent assistance for ADL's and homemaking tasks  Discharge Equipment Recommendations:  3-in-1 commode, bath bench, wheelchair, walker, rolling  Barriers to discharge:  Inaccessible home environment    Assessment:     Manisha Wynne is a 72 y.o. female with a medical diagnosis of Cervical myelopathy.  She presents with the following performance deficits affecting function are weakness, impaired endurance, impaired self care skills, impaired functional mobility, gait instability, impaired balance, decreased lower extremity function, orthopedic precautions, impaired cardiopulmonary response to activity.  Pt tolerated session well and without incident, but she continues to require assistance to perform self-care tasks and mobility.  She would continue to benefit from skilled OT services at SNF to maximize her gains in functional independence.      Rehab Potential is good    Activity tolerance:  Good    Plan:     Patient to be seen 5 x/week to address the above listed problems via self-care/home management, therapeutic exercises, therapeutic activities    Plan of Care Expires: 12/24/22  Plan of Care Reviewed with: patient    Subjective   "Oh this was great, thank you"  Communicated with: Eddie prior to session.     Pain/Comfort:  Pain Rating 1: 6/10  Location - Side 1: Bilateral  Location - Orientation 1: posterior  Location 1: neck  Pain Addressed 1: Reposition, Distraction  Pain Rating Post-Intervention 1: 6/10    Patient's cultural, spiritual, Baptism conflicts given the current situation:  no    Objective:     Patient found up in chair with  " (no active lines) upon OT entry to room.    Bed Mobility:    N/A due to pt up in chair at beginning and end of session.     Functional Mobility/Transfers:  Patient completed Sit <> Stand Transfer with stand by assistance  with  rolling walker   Verbal cues to scoot forward in chair prior to standing  Functional Mobility: Pt ambulated within room with SBA and RW.     Activities of Daily Living:  Grooming: supervision to perform oral cares and hair grooming while standing at sink.   Pt observed turning head, requiring max A to tighten Aspen collar    [facility] 6 Click ADL: 20    Treatment & Education:  Pt participated in dynamic standing activity with sup and RW. Pt organizing personal items in closet with A to  items from floor level. Pt folding and hanging clothing items and reorganizing items in multiple bags. Activity focus on standing tolerance, functional reaching, dynamic standing bal, crossing midline, and to promote independence with homemaking and self care tasks. Pt tolerated standing for  ~20min. Pt required occasional verbal cues to position self in front of items to avoid twisting to adhere to spinal precautions.     Pt edu on role of OT, POC, safety when performing self care tasks , benefit of performing OOB activity, orthopedic precautions, and safety when performing functional transfers and mobility.  - Self care tasks completed-- as noted above      Patient left up in chair with call button in reach, Nsg notified, and all immediate needs met.    GOALS:   Multidisciplinary Problems       Occupational Therapy Goals          Problem: Occupational Therapy    Goal Priority Disciplines Outcome Interventions   Occupational Therapy Goal     OT, PT/OT Ongoing, Progressing    Description: Goals to be met by: 12/24/22     Patient will increase functional independence with ADLs by performing:    UE Dressing with Modified [place].  LE Dressing with Modified [place].  Grooming while standing at sink  with Modified [place].  Toileting from toilet with Modified [place] for hygiene and clothing management.   Bathing from  shower chair/bench with Supervision.  Step transfer with Modified [place]  Upper extremity exercise program with supervision.  Caregiver will be educated on level of assistance required to safely perform self care and functional transfers.                         Time Tracking:     OT Date of Treatment: 11/30/22  OT Start Time: 1450    OT Stop Time: 1520  OT Total Time (min): 30 min    Billable Minutes:Self Care/Home Management 10  Therapeutic Activity 20    11/30/2022

## 2022-11-30 NOTE — TREATMENT PLAN
"Rehab Services' DME recommendations    Manisha Wynne  MRN: [id number]      [x] Walker Adult (5'1"-6"6")    Accessories N/A    Wheels Yes       [x] Wheelchair  Number of hours up in a wheelchair per day 8       Style Light weight        Seat Width 18 (Standard adult)    Seat Depth 18    Back Height Standard    Leg Support Standard and Swing Away    Arm Height Full and Swing Away    Lap Belt Velcro    Accessories Anti-tippers and Safety belt    Cushion Basic    Justification for Cushion to maintain skin integrity     [x] 3 in 1 commode Standard      [x] Tub bench Standard (unpadded)       [x] Home health PT and OT      Katerine Grossman OT 11/30/2022     "

## 2022-12-01 LAB
ANION GAP SERPL CALC-SCNC: 7 MMOL/L (ref 8–16)
BASOPHILS # BLD AUTO: 0.04 K/UL (ref 0–0.2)
BASOPHILS NFR BLD: 0.7 % (ref 0–1.9)
BUN SERPL-MCNC: 18 MG/DL (ref 8–23)
CALCIUM SERPL-MCNC: 9.2 MG/DL (ref 8.7–10.5)
CHLORIDE SERPL-SCNC: 107 MMOL/L (ref 95–110)
CO2 SERPL-SCNC: 25 MMOL/L (ref 23–29)
CREAT SERPL-MCNC: 0.9 MG/DL (ref 0.5–1.4)
DIFFERENTIAL METHOD: ABNORMAL
EOSINOPHIL # BLD AUTO: 0.3 K/UL (ref 0–0.5)
EOSINOPHIL NFR BLD: 4.2 % (ref 0–8)
ERYTHROCYTE [DISTWIDTH] IN BLOOD BY AUTOMATED COUNT: 13.7 % (ref 11.5–14.5)
EST. GFR  (NO RACE VARIABLE): >60 ML/MIN/1.73 M^2
GLUCOSE SERPL-MCNC: 88 MG/DL (ref 70–110)
HCT VFR BLD AUTO: 29.6 % (ref 37–48.5)
HGB BLD-MCNC: 9.4 G/DL (ref 12–16)
IMM GRANULOCYTES # BLD AUTO: 0.05 K/UL (ref 0–0.04)
IMM GRANULOCYTES NFR BLD AUTO: 0.8 % (ref 0–0.5)
LYMPHOCYTES # BLD AUTO: 1.9 K/UL (ref 1–4.8)
LYMPHOCYTES NFR BLD: 30.1 % (ref 18–48)
MAGNESIUM SERPL-MCNC: 1.9 MG/DL (ref 1.6–2.6)
MCH RBC QN AUTO: 30.2 PG (ref 27–31)
MCHC RBC AUTO-ENTMCNC: 31.8 G/DL (ref 32–36)
MCV RBC AUTO: 95 FL (ref 82–98)
MONOCYTES # BLD AUTO: 0.7 K/UL (ref 0.3–1)
MONOCYTES NFR BLD: 11.9 % (ref 4–15)
NEUTROPHILS # BLD AUTO: 3.2 K/UL (ref 1.8–7.7)
NEUTROPHILS NFR BLD: 52.3 % (ref 38–73)
NRBC BLD-RTO: 0 /100 WBC
PHOSPHATE SERPL-MCNC: 3.9 MG/DL (ref 2.7–4.5)
PLATELET # BLD AUTO: 270 K/UL (ref 150–450)
PMV BLD AUTO: 9.8 FL (ref 9.2–12.9)
POTASSIUM SERPL-SCNC: 4.7 MMOL/L (ref 3.5–5.1)
RBC # BLD AUTO: 3.11 M/UL (ref 4–5.4)
SODIUM SERPL-SCNC: 139 MMOL/L (ref 136–145)
WBC # BLD AUTO: 6.14 K/UL (ref 3.9–12.7)

## 2022-12-01 PROCEDURE — 97110 THERAPEUTIC EXERCISES: CPT | Mod: CO

## 2022-12-01 PROCEDURE — 36415 COLL VENOUS BLD VENIPUNCTURE: CPT | Performed by: HOSPITALIST

## 2022-12-01 PROCEDURE — 63600175 PHARM REV CODE 636 W HCPCS: Performed by: NURSE PRACTITIONER

## 2022-12-01 PROCEDURE — 83735 ASSAY OF MAGNESIUM: CPT | Performed by: HOSPITALIST

## 2022-12-01 PROCEDURE — 80048 BASIC METABOLIC PNL TOTAL CA: CPT | Performed by: HOSPITALIST

## 2022-12-01 PROCEDURE — 25000003 PHARM REV CODE 250: Performed by: HOSPITALIST

## 2022-12-01 PROCEDURE — 84100 ASSAY OF PHOSPHORUS: CPT | Performed by: HOSPITALIST

## 2022-12-01 PROCEDURE — 97110 THERAPEUTIC EXERCISES: CPT | Mod: CQ

## 2022-12-01 PROCEDURE — 85025 COMPLETE CBC W/AUTO DIFF WBC: CPT | Performed by: HOSPITALIST

## 2022-12-01 PROCEDURE — 97535 SELF CARE MNGMENT TRAINING: CPT | Mod: CO

## 2022-12-01 PROCEDURE — 11000004 HC SNF PRIVATE

## 2022-12-01 PROCEDURE — 97530 THERAPEUTIC ACTIVITIES: CPT | Mod: CQ

## 2022-12-01 PROCEDURE — 25000242 PHARM REV CODE 250 ALT 637 W/ HCPCS: Performed by: HOSPITALIST

## 2022-12-01 PROCEDURE — 97530 THERAPEUTIC ACTIVITIES: CPT | Mod: CO

## 2022-12-01 PROCEDURE — 97116 GAIT TRAINING THERAPY: CPT | Mod: CQ

## 2022-12-01 RX ORDER — FERROUS SULFATE, DRIED 160(50) MG
1 TABLET, EXTENDED RELEASE ORAL NIGHTLY
Status: DISCONTINUED | OUTPATIENT
Start: 2022-12-02 | End: 2022-12-14 | Stop reason: HOSPADM

## 2022-12-01 RX ORDER — ENOXAPARIN SODIUM 100 MG/ML
40 INJECTION SUBCUTANEOUS EVERY 24 HOURS
Status: DISCONTINUED | OUTPATIENT
Start: 2022-12-01 | End: 2022-12-14 | Stop reason: HOSPADM

## 2022-12-01 RX ORDER — AMOXICILLIN 250 MG
1 CAPSULE ORAL 2 TIMES DAILY PRN
Status: DISCONTINUED | OUTPATIENT
Start: 2022-12-01 | End: 2022-12-14 | Stop reason: HOSPADM

## 2022-12-01 RX ORDER — ACETAMINOPHEN 500 MG
5000 TABLET ORAL NIGHTLY
Status: DISCONTINUED | OUTPATIENT
Start: 2022-12-02 | End: 2022-12-14 | Stop reason: HOSPADM

## 2022-12-01 RX ORDER — PRAVASTATIN SODIUM 20 MG/1
20 TABLET ORAL NIGHTLY
Status: DISCONTINUED | OUTPATIENT
Start: 2022-12-02 | End: 2022-12-14 | Stop reason: HOSPADM

## 2022-12-01 RX ADMIN — LOSARTAN POTASSIUM 25 MG: 25 TABLET, FILM COATED ORAL at 08:12

## 2022-12-01 RX ADMIN — ACETAMINOPHEN 1000 MG: 500 TABLET ORAL at 08:12

## 2022-12-01 RX ADMIN — FLUOXETINE 40 MG: 10 CAPSULE ORAL at 09:12

## 2022-12-01 RX ADMIN — GABAPENTIN 100 MG: 100 CAPSULE ORAL at 08:12

## 2022-12-01 RX ADMIN — METHOCARBAMOL 1000 MG: 500 TABLET ORAL at 06:12

## 2022-12-01 RX ADMIN — MENTHOL, METHYL SALICYLATE: 10; 15 CREAM TOPICAL at 08:12

## 2022-12-01 RX ADMIN — METHOCARBAMOL 1000 MG: 500 TABLET ORAL at 08:12

## 2022-12-01 RX ADMIN — METHOCARBAMOL 1000 MG: 500 TABLET ORAL at 02:12

## 2022-12-01 RX ADMIN — GABAPENTIN 100 MG: 100 CAPSULE ORAL at 02:12

## 2022-12-01 RX ADMIN — ACETAMINOPHEN 1000 MG: 500 TABLET ORAL at 02:12

## 2022-12-01 RX ADMIN — Medication 1 TABLET: at 08:12

## 2022-12-01 RX ADMIN — CHOLECALCIFEROL TAB 125 MCG (5000 UNIT) 5000 UNITS: 125 TAB at 08:12

## 2022-12-01 RX ADMIN — METOPROLOL TARTRATE 25 MG: 25 TABLET, FILM COATED ORAL at 08:12

## 2022-12-01 RX ADMIN — BUPROPION HYDROCHLORIDE 300 MG: 300 TABLET, FILM COATED, EXTENDED RELEASE ORAL at 08:12

## 2022-12-01 RX ADMIN — METOPROLOL TARTRATE 25 MG: 25 TABLET, FILM COATED ORAL at 09:12

## 2022-12-01 RX ADMIN — Medication 70 MG: at 07:12

## 2022-12-01 RX ADMIN — ENOXAPARIN SODIUM 40 MG: 100 INJECTION SUBCUTANEOUS at 05:12

## 2022-12-01 RX ADMIN — Medication 6 MG: at 08:12

## 2022-12-01 RX ADMIN — ACETAMINOPHEN 1000 MG: 500 TABLET ORAL at 06:12

## 2022-12-01 NOTE — PROGRESS NOTES
"[facility] - Skilled Nursing  Adult Nutrition  Progress Note    SUMMARY   Recommendations  Recommendation/Intervention: Continue regular Kosher diet,RD following  Goals: Pt to continue to meet > 75% EEN by RD follow up  Nutrition Goal Status: progressing towards goal  Communication of RD Recs: other (comment) (POC)    Assessment and Plan  Increased nutrient needs     Related to (etiology):   Wound healing     Signs and Symptoms (as evidenced by):   Incision to neck     Interventions(treatment strategy):  General, Healthful diet  Collaboration with other providers     Nutrition Diagnosis Status: continues    Malnutrition Assessment 11/28/22     Skin (Micronutrient): pallor, wounds unhealed  Neck/Chest (Micronutrient): muscle wasting  Musculoskeletal/Lower Extremities: muscle wasting           Orbital Region (Subcutaneous Fat Loss): mild depletion  Thoracic and Lumbar Region: well nourished   Roman [employer] Region (Muscle Loss): mild depletion  Clavicle and Acromion Bone Region (Muscle Loss): mild depletion  Scapular Bone Region (Muscle Loss): mild depletion  Dorsal Hand (Muscle Loss): mild depletion  Anterior Thigh Region (Muscle Loss): mild depletion                 Reason for Assessment    Reason For Assessment: RD follow-up  Diagnosis:  (cervical myelopathy)  Relevant Medical History: Afib, depression, HTN, HLD  Interdisciplinary Rounds: did not attend  General Information Comments: patient is getting outside Kosher meals thru dietary and is eating %, she has snacks available and her preferences are posted. NFPE completed showing age related changes, patient is not malnourished.  Nutrition Discharge Planning: Discharge on Cardiac Diet    Nutrition/Diet History    Spiritual, Cultural Beliefs, Protestant Practices, Values that Affect Care: yes  Food Allergies: NKFA  Factors Affecting Nutritional Intake: None identified at this time    Anthropometrics    Temp: 97.6 °F (36.4 °C)  Height Method: Stated  Height: 5' 8" " (172.7 cm)  Height (inches): 68 in  Weight Method: Standard Scale  Weight: 74.6 kg (164 lb 7.4 oz)  Weight (lb): 164.46 lb  Ideal Body Weight (IBW), Female: 140 lb  % Ideal Body Weight, Female (lb): 117.47 %  BMI (Calculated): 25       Lab/Procedures/Meds    Pertinent Labs Reviewed: reviewed  Pertinent Labs Comments: Hg 9.4, Hg 29.6,  Pertinent Medications Reviewed: reviewed  Pertinent Medications Comments: Lovenox       Estimated/Assessed Needs    Weight Used For Calorie Calculations: 74.4 kg (164 lb)  Energy Calorie Requirements (kcal): 1550 kcal/day based on MSJ x 1.2 AF  Energy Need Method: Warren-St Jeor  Protein Requirements: 75-89 g/day based on 1-1.2 g/kg  Weight Used For Protein Calculations: 74.4 kg (164 lb)  Fluid Requirements (mL): 1 mL/kcal or per MD  Estimated Fluid Requirement Method: RDA Method  RDA Method (mL): 1550         Nutrition Prescription Ordered    Current Diet Order: Regular    Evaluation of Received Nutrient/Fluid Intake    I/O: no vanessa  Energy Calories Required: meeting needs  Protein Required: meeting needs  Fluid Required: meeting needs  Comments: LBM 11/29  Tolerance: tolerating  % Intake of Estimated Energy Needs: 75 - 100 %  % Meal Intake: 75 - 100 %    Nutrition Risk    Level of Risk/Frequency of Follow-up: low (one time per week)     Monitor and Evaluation    Food and Nutrient Intake: energy intake, food and beverage intake, enteral nutrition intake  Food and Nutrient Adminstration: diet order  Knowledge/Beliefs/Attitudes: food and nutrition knowledge/skill, beliefs and attitudes  Physical Activity and Function: nutrition-related ADLs and IADLs  Anthropometric Measurements: weight change, body mass index, weight  Biochemical Data, Medical Tests and Procedures: lipid profile, electrolyte and renal panel, gastrointestinal profile, glucose/endocrine profile, inflammatory profile  Nutrition-Focused Physical Findings: overall appearance, extremities, muscles and bones, skin      Nutrition Follow-Up    RD Follow-up?: Yes

## 2022-12-01 NOTE — PT/OT/SLP PROGRESS
"Physical Therapy Treatment    Patient Name:  Manisha Wynne   MRN:  [id number]  Admit Date: 11/23/2022  Admitting Diagnosis: Cervical myelopathy  Recent Surgeries:     General Precautions: Standard, fall   Orthopedic Precautions:spinal precautions   Braces: Aspen collar     Recommendations:     Discharge Recommendations:  home health PT   Level of Assistance Recommended at Discharge: Intermittent assistance   Discharge Equipment Recommendations: bedside commode, bath bench, wheelchair, walker, rolling   Barriers to discharge: Inaccessible home environment, Decreased caregiver support    Assessment:     Manisha Wynne is a 72 y.o. female admitted with a medical diagnosis of Cervical myelopathy .Pt tolerated well, pt would continue to benefit from skilled PT services to improve overall functional mobility, strength and endurance.  .      Performance deficits affecting function:  weakness, impaired endurance, impaired functional mobility, gait instability, impaired balance, decreased lower extremity function, impaired cardiopulmonary response to activity, orthopedic precautions .    Rehab Potential is good    Activity Tolerance: Fair    Plan:     Patient to be seen 6 x/week to address the above listed problems via gait training, therapeutic activities, therapeutic exercises, neuromuscular re-education, wheelchair management/training    Plan of Care Expires: 12/25/22  Plan of Care Reviewed with: patient    Subjective     "Ready".     Pain/Comfort:  Pain Rating 1: 4/10  Location 1: neck  Pain Addressed 1: Pre-medicate for activity  Pain Rating Post-Intervention 1: 4/10 (no further mentioned)    Patient's cultural, spiritual, Quaker conflicts given the current situation:  no    Objective:       Patient found with  (in wc cervical collar) upon PT entry to room.     Therapeutic Activities and Exercises: recumbent cross  x 15 min L-2    Functional Mobility:  Transfers:     Sit to Stand:  stand by " "assistance with rolling walker  Gait: amb with RW CG/SBA ~260 ft no LOB occ vcs to stop walking if she needs to let go of walker for any reason, pt verbalized understanding  Stairs:  asc/robin 4 step with BHR CG/close SBA x 3 trials (12 steps)  Curb asc/robin 4" curb with RW CG/close SBA x 2 trials  Wheelchair Propulsion:  ~150 ft with BUE S    AM-PAC 6 CLICK MOBILITY  20    Patient left up in chair with call button in reach and belongings in reach .    GOALS:   Multidisciplinary Problems       Physical Therapy Goals          Problem: Physical Therapy    Goal Priority Disciplines Outcome Goal Variances Interventions   Physical Therapy Goal     PT, PT/OT Ongoing, Progressing     Description: Goals to be met by: 12/25/22    Patient will increase functional independence with mobility by performing:    . Supine to sit with Modified [place]  . Sit to supine with Modified [place]  . Rolling to Left and Right with Modified [place].  . Sit to stand transfer with Supervision  . Bed to chair transfer with Supervision using Rolling Walker/no AD  . Gait  x 150 feet with Supervision using Rolling Walker.   . Wheelchair propulsion x150 feet with Modified [place] using bilateral uppper extremities  . Ascend/descend 12 stair with bilateral Handrails Supervision using No Assistive Device.   . Ascend/Descend 4 inch curb step with Supervision using Rolling Walker.                         Time Tracking:     PT Received On: 12/01/22  PT Start Time: 1333  PT Stop Time: 1419  PT Total Time (min): 46 min    Billable Minutes: Gait Training 13, Therapeutic Activity 18, and Therapeutic Exercise 15    Treatment Type: Treatment  PT/PTA: PTA     PTA Visit Number: 2     12/01/2022  "

## 2022-12-01 NOTE — PROGRESS NOTES
[facility]                                  Skilled Nursing Facility                   Progress Note     Patient Name: Manisha Wynne  YOB: 1950  MRN: [id number]  Room: [place] [address] [address] A     Admit Date: 11/23/2022   CARO: 12/14/2022     Principal Problem: Cervical myelopathy    HPI obtained from patient interview and chart review     Chief Complaint:    Re-evaluation of medical treatment and therapy status: Lab review        HPI:   Manisha Wynne is a 72 y.o. female with PMH of cervical stenosis, myelopathy, and hypertension. She has an MRI-non-compatible pacemaker in place. s/p C3-C6 posterior cervical fusion on 11/14 by Dr. Cheek.     Patient will be treated at [facility] with PT and OT to improve functional status and ability to perform ADLs.     Interval History  All of today's labs reviewed and are listed below. No critical values noted. Discussed results with patient. Pt has requested that her vitamin D and C be given at bedtime with Pravastatin. Pt also c/o pain medications not therapeutic. Will change pain regimen and follow up. 24 hr vital sign ranges listed below.  Patient denies shortness of breath, abdominal discomfort, nausea, or vomiting.  Patient reports an adequate appetite.  Patient denies dysuria.  Patient reports having regular bowel movements.  Patient progessing with PT/OT. Continuing to follow and treat all acute and chronic conditions.    Past Medical History: Patient has a past medical history of Abnormal Pap smear, Atrial fibrillation, AV block, 1st degree (07/25/2012), C. difficile diarrhea, Cataract, Depression (07/24/2012), Facet arthritis of lumbar region (03/31/2015), Falls, Hyperlipidemia, Hypertension (07/24/2012), Neuropathy, Other specified cardiac dysrhythmias(427.89), Sleep apnea, Syncope (07/24/2012), and Tremors of nervous system.    Past Surgical History: Patient has a past  surgical history that includes Tonsillectomy; Dilation and curettage of uterus; Cardiac pacemaker placement (09/07/2012); Endoscopic ultrasound of upper gastrointestinal tract (N/A, 7/5/2019); [place] tooth extraction; Nasal septoplasty (N/A, 12/19/2019); Application of cartilage graft (Bilateral, 12/19/2019); Surgical removal of nasal turbinate (Bilateral, 12/19/2019); Open reduction and internal fixation (ORIF) of fracture of distal radius (Left, 1/24/2020); Myelography (N/A, 5/4/2021); Cataract extraction w/  intraocular lens implant (Right, 5/16/2022); Cataract extraction w/  intraocular lens implant (Left, 6/20/2022); Replacement of pacemaker generator (Left, 10/7/2022); and Posterior fusion of cervical spine with laminectomy (N/A, 11/14/2022).    Social History: Patient reports that she quit smoking about 40 years ago. Her smoking use included cigarettes. She has a 3.75 pack-year smoking history. She has quit using smokeless tobacco. She reports current alcohol use. She reports that she does not use drugs.    Family History:  family history is not on file. She was adopted.    Allergies: Patient has No Known Allergies.        Review of Systems   Constitutional:  Positive for malaise/fatigue. Negative for chills and fever.   HENT:  Negative for congestion, hearing loss and sore throat.    Eyes:  Negative for blurred vision and double vision.   Respiratory:  Negative for cough, sputum production, shortness of breath and wheezing.    Cardiovascular:  Negative for chest pain, palpitations and leg swelling.   Gastrointestinal:  Negative for abdominal pain, constipation, diarrhea, heartburn, nausea and vomiting.   Genitourinary:  Negative for dysuria and urgency.   Musculoskeletal:  Negative for back pain.   Skin:  Negative for rash.        + wound   Neurological:  Positive for weakness. Negative for dizziness and headaches.   Endo/Heme/Allergies:  Negative for polydipsia. Does not bruise/bleed easily.    Psychiatric/Behavioral:  Negative for depression and hallucinations. The patient is not nervous/anxious.        24 hour Vital Sign Range   Temp:  [97.6 °F (36.4 °C)-98 °F (36.7 °C)]   Pulse:  [62]   Resp:  [17-18]   BP: (110-127)/(54-60)   SpO2:  [100 %]       Physical Exam  Vitals and nursing note reviewed.   Constitutional:       General: She is not in acute distress.     Appearance: Normal appearance. She is not ill-appearing.   HENT:      Head: Normocephalic and atraumatic.      Nose: Nose normal. No congestion.      Mouth/Throat:      Mouth: Mucous membranes are moist.      Pharynx: Oropharynx is clear.   Eyes:      Extraocular Movements: Extraocular movements intact.      Conjunctiva/sclera: Conjunctivae normal.      Pupils: Pupils are equal, round, and reactive to light.   Neck:      Comments: C-collar in place    Cardiovascular:      Rate and Rhythm: Normal rate and regular rhythm.      Pulses: Normal pulses.      Heart sounds: Normal heart sounds. No murmur heard.  Pulmonary:      Effort: Pulmonary effort is normal. No respiratory distress.      Breath sounds: Normal breath sounds. No wheezing or rhonchi.   Abdominal:      General: Bowel sounds are normal. There is no distension.      Palpations: Abdomen is soft. There is no mass.      Tenderness: There is no abdominal tenderness.   Musculoskeletal:         General: No swelling or tenderness.      Cervical back: Normal range of motion and neck supple. No tenderness.      Right lower leg: No edema.      Left lower leg: No edema.   Skin:     General: Skin is warm and dry.      Capillary Refill: Capillary refill takes less than 2 seconds.      Findings: No erythema or rash.   Neurological:      Mental Status: She is alert and oriented to person, place, and time. Mental status is at baseline.      Motor: Weakness present.   Psychiatric:         Mood and Affect: Mood normal.         Behavior: Behavior normal.         Thought Content: Thought content normal.          Judgment: Judgment normal.     Full skin assessment completed by this NP           Incision/Site 06/25/15 1053 Left back (Active)   06/25/15 1053   Present Prior to Hospital Arrival?:    Side: Left   Location: back   Orientation:    Incision Type:    Closure Method:    Additional Comments:    Removal Indication and Assessment:    Wound Outcome:    Removal Indications:    Number of days: 2714            Incision/Site 11/14/22 1813 Neck (Active)   11/14/22 1813   Present Prior to Hospital Arrival?:    Side:    Location: Neck   Orientation:    Incision Type:    Closure Method:    Additional Comments:    Removal Indication and Assessment:    Wound Outcome:    Removal Indications:    Incision WDL ex 11/28/22 0950   Dressing Appearance Open to air 11/28/22 2126   Drainage Amount None 11/28/22 2126   Drainage Characteristics/Odor No odor 11/28/22 2126   Appearance Intact;Steri-strips intact 11/28/22 2126   Periwound Area Intact;Dry;Pink;Edematous 11/28/22 2126   Wound Edges Approximated 11/28/22 2126   Dressing Other (comment) 11/26/22 1001   Number of days: 15          Labs:  Recent Labs   Lab 11/24/22  1205 11/28/22  0504 12/01/22  0436   WBC 6.42 7.30 6.14   HGB 9.6* 9.6* 9.4*   HCT 29.5* 29.7* 29.6*    274 270       Recent Labs   Lab 11/24/22  1205 11/28/22  0504 12/01/22  0436    139 139   K 5.1 4.7 4.7    107 107   CO2 24 21* 25   BUN 11 16 18   CREATININE 0.8 0.9 0.9   GLU 98 94 88   CALCIUM 9.1 9.5 9.2   MG 2.2 2.0 1.9   PHOS 2.8 4.8* 3.9       No results for input(s): ALKPHOS, ALT, AST, ALBUMIN, PROT, BILITOT, INR in the last 168 hours.  No results for input(s): POCTGLUCOSE in the last 72 hours.    Meds Scheduled:   acetaminophen  1,000 mg Oral Q8H    buPROPion  300 mg Oral Daily    calcium-vitamin D3  1 tablet Oral Daily    cholecalciferol (vitamin D3)  5,000 Units Oral Daily    enoxaparin  40 mg Subcutaneous Daily    FLUoxetine  40 mg Oral Daily    gabapentin  100 mg Oral TID     lisdexamfetamine  70 mg Oral QAM    losartan  25 mg Oral Daily    methocarbamoL  1,000 mg Oral Q8H    methyl salicylate-menthol 15-10%   Topical (Top) QHS    metoprolol tartrate  25 mg Oral BID    pravastatin  20 mg Oral Daily       PRN:   acetaminophen, calcium carbonate, dicyclomine, melatonin, oxyCODONE, senna-docusate 8.6-50 mg      Assessment and Plan:    Cervical myelopathy  cervical myelopathy s/p C3-C6 posterior cervical fusion on 11/14 by Dr. Cheek    - HELEN collar to be worn when upright and OOB  - Pain control- Initiate order to increase oxycodone to 15 q 4 hours prn severe pain. Increased gabapentin to 200 mg tid. Continue scheduled robaxin and tylenol.   - Bowel regimen: senna BID and miralax daily. 11/28 - -Atelectasis prevention: IS hourly while awake, PT/OT, OOB > 6 hours per day  -DVT PPx- Lovenox 40mg daily     Hypertension   continue home meds. Metoprolol 25mg, Losartan 25mg    Anticipate disposition:    Home with home health      Follow-up needed during SNF admission:       Follow-up needed after discharge from SNF:   - PCP within 1-2 weeks  - See appt scheduled below     Future Appointments   Date Time Provider Department Center   12/22/2022 10:00 AM [facility] OIC-XRAY [facility] XRAY [facility]   12/22/2022 11:00 AM Nicolette Cheek MD [facility] NEUROS8 [facility]   1/5/2023 10:00 AM HOME MONITOR DEVICE CHECK, [facility] ARRHPRO [facility]   1/10/2023  8:45 AM EKG, APPT [facility] EKG [facility]   1/10/2023  9:00 AM COORDINATED DEVICE CHECK [facility] ARRHPRO [facility]   1/10/2023  9:30 AM Svetlana Miner NP [facility] ARRHYTH [facility]   2/2/2023 11:30 AM Sunitha Rosario PA-C [facility] NEURO8 [facility]   2/10/2023 10:00 AM LAB, APPOINTMENT [facility] INTMED [facility] LAB IM [facility] PCW   2/23/2023  9:15 AM INJECTION [facility] AMB INF [facility]         I certify that SNF services are required to be given on an inpatient basis because Manisha Wynne needs for skilled nursing care and/or skilled rehabilitation are required on a daily basis  and such services can only practically be provided in a skilled nursing facility setting and are for an ongoing condition for which she received inpatient care in the hospital.            Angely De La Rosa NP  Department of Hospital Medicine   [facility]     DOS: 12/1/2022       Patient note was created using MModal Dictation.  Any errors in syntax or even information may not have been identified and edited on initial review prior to signing this note.

## 2022-12-01 NOTE — PT/OT/SLP PROGRESS
Occupational Therapy   Treatment    Name: Manisha Wynne  MRN: [id number]  Admit Date: 11/23/2022  Admitting Diagnosis:  Cervical myelopathy    General Precautions: Standard, fall   Orthopedic Precautions:spinal precautions   Braces: Aspen collar     Recommendations:     Discharge Recommendations: home health OT  Level of Assistance Recommended at Discharge: Intermittent assistance for ADL's and homemaking tasks  Discharge Equipment Recommendations:  3-in-1 commode, bath bench, wheelchair, walker, rolling  Barriers to discharge:  Inaccessible home environment    Assessment:     Manisha Wynne is a 72 y.o. female with a medical diagnosis of Cervical myelopathy.  She presents with limitations in performance of self-care, functional mobility, and ADLs. Performance deficits affecting function are weakness, impaired endurance, impaired self care skills, impaired functional mobility, gait instability, impaired balance, decreased lower extremity function, orthopedic precautions, impaired cardiopulmonary response to activity.   Pt tolerated Tx without incident and is making progress but continues to require assist to perform self care tasks, functional mobility and functional transfers. She would continue to benefit from OT intervention to further her functional (I)ce and safety.    Rehab Potential is good    Activity tolerance:  Good    Plan:     Patient to be seen 5 x/week to address the above listed problems via self-care/home management, therapeutic exercises, therapeutic activities    Plan of Care Expires: 12/24/22  Plan of Care Reviewed with: patient    Subjective     Communicated with: Nurse prior to session.    Pain/Comfort:  Pain Rating 1: 4/10  Location - Side 1: Bilateral  Location - Orientation 1: posterior  Location 1: neck  Pain Addressed 1: Reposition, Distraction  Pain Rating Post-Intervention 1:  (did not rate)    Patient's cultural, spiritual, Cheondoism conflicts given the current  situation:  no    Objective:     Patient found HOB elevated with  (no active lines) upon OT entry to room.    Bed Mobility:    Patient completed Scooting to EOB with supervision  Patient completed Supine to Sit with supervision     Functional Mobility/Transfers:  Patient completed Sit <> Stand Transfer with supervision  with  rolling walker   Patient completed Bed <> Chair Transfer using Step Transfer technique with stand by assistance with rolling walker  Functional Mobility: Pt ambulated around room to bathroom with SBA and RW. Pt propelled w/c from room to therapy gym with sup for a total of 145 ft using BUE to propel chair.     Activities of Daily Living:  Grooming: modified independence standing sinkside with supervision   Upper Body Dressing: minimum assistance to clasp bra behind back secondary to precautions   Lower Body Dressing: stand by assistance sitting EOB and standing to manage pants with RW     AMPA 6 Click ADL: 20    OT Exercises: Pt performed UBE exercise with 3 lb weighted bar through functional ROM with repetition of 15 x3 for each motion while adhering to spinal precautions.  UE exercises performed to increase functional endurance and strength in order increase independence when performing self care tasks, functional ambulation, W/C propulsion, and functional standing activities .    Treatment & Education:  Pt participated in standing activity with sup and RW. Pt at raised counter to perform fine motor and visual scanning activity with focus on standing tolerance, functional reaching, dynamic standing bal, crossing midline, and to promote independence with homemaking and self care tasks. Pt tolerated standing for 8 min and 55 sec.    Pt educated on role of OT, safety while performing functional transfers, safety while performing self care tasks, orthopedic precautions, importance to adhering to precautions and progress towards OT goals       Patient left up in chair with call button in  reach    GOALS:   Multidisciplinary Problems       Occupational Therapy Goals          Problem: Occupational Therapy    Goal Priority Disciplines Outcome Interventions   Occupational Therapy Goal     OT, PT/OT Ongoing, Progressing    Description: Goals to be met by: 12/24/22     Patient will increase functional independence with ADLs by performing:    UE Dressing with Modified [place].  LE Dressing with Modified [place].  Grooming while standing at sink with Modified [place].  Toileting from toilet with Modified [place] for hygiene and clothing management.   Bathing from  shower chair/bench with Supervision.  Step transfer with Modified [place]  Upper extremity exercise program with supervision.  Caregiver will be educated on level of assistance required to safely perform self care and functional transfers.                         Time Tracking:     OT Date of Treatment: 12/01/22  OT Start Time: 0954    OT Stop Time: 1040  OT Total Time (min): 46 min    Billable Minutes:Self Care/Home Management 18  Therapeutic Activity 15  Therapeutic Exercise 13    12/1/2022

## 2022-12-01 NOTE — PLAN OF CARE
Patient remained free of injury, trauma, or falls during  shift.   Patient is alert  and oriented x 4.   Care plan reviewed with patient, questions encouraged.   Vitals remained stable throughout shift, will continue to monitor   Problem: Adult Inpatient Plan of Care  Goal: Plan of Care Review  Outcome: Ongoing, Progressing  Flowsheets (Taken 12/1/2022 0234)  Plan of Care Reviewed With: patient  Goal: Patient-Specific Goal (Individualized)  Outcome: Ongoing, Progressing  Goal: Absence of Hospital-Acquired Illness or Injury  Outcome: Ongoing, Progressing  Intervention: Identify and Manage Fall Risk  Flowsheets (Taken 12/1/2022 0234)  Safety Promotion/Fall Prevention:   assistive device/personal item within reach   diversional activities provided   Fall Risk reviewed with patient/family   Fall Risk signage in place   lighting adjusted   medications reviewed   muscle strengthening facilitated   side rails raised x 2   instructed to call staff for mobility  Goal: Optimal Comfort and Wellbeing  Outcome: Ongoing, Progressing  Intervention: Provide Person-Centered Care  Flowsheets (Taken 12/1/2022 0234)  Trust Relationship/Rapport:   care explained   questions encouraged   choices provided   reassurance provided   emotional support provided   thoughts/feelings acknowledged   empathic listening provided   questions answered  Goal: Readiness for Transition of Care  Outcome: Ongoing, Progressing     Problem: Skin Injury Risk Increased  Goal: Skin Health and Integrity  Outcome: Ongoing, Progressing     Problem: Fall Injury Risk  Goal: Absence of Fall and Fall-Related Injury  Outcome: Ongoing, Progressing

## 2022-12-02 PROCEDURE — 11000004 HC SNF PRIVATE

## 2022-12-02 PROCEDURE — 97116 GAIT TRAINING THERAPY: CPT | Mod: CQ

## 2022-12-02 PROCEDURE — 63600175 PHARM REV CODE 636 W HCPCS: Performed by: NURSE PRACTITIONER

## 2022-12-02 PROCEDURE — 25000242 PHARM REV CODE 250 ALT 637 W/ HCPCS: Performed by: HOSPITALIST

## 2022-12-02 PROCEDURE — 25000003 PHARM REV CODE 250: Performed by: HOSPITALIST

## 2022-12-02 PROCEDURE — 25000003 PHARM REV CODE 250: Performed by: NURSE PRACTITIONER

## 2022-12-02 PROCEDURE — 97110 THERAPEUTIC EXERCISES: CPT | Mod: CQ

## 2022-12-02 PROCEDURE — 97535 SELF CARE MNGMENT TRAINING: CPT | Mod: CO

## 2022-12-02 PROCEDURE — 97530 THERAPEUTIC ACTIVITIES: CPT | Mod: CO

## 2022-12-02 RX ADMIN — METHOCARBAMOL 1000 MG: 500 TABLET ORAL at 06:12

## 2022-12-02 RX ADMIN — LOSARTAN POTASSIUM 25 MG: 25 TABLET, FILM COATED ORAL at 08:12

## 2022-12-02 RX ADMIN — ACETAMINOPHEN 1000 MG: 500 TABLET ORAL at 09:12

## 2022-12-02 RX ADMIN — Medication 1 TABLET: at 09:12

## 2022-12-02 RX ADMIN — ACETAMINOPHEN 1000 MG: 500 TABLET ORAL at 06:12

## 2022-12-02 RX ADMIN — METOPROLOL TARTRATE 25 MG: 25 TABLET, FILM COATED ORAL at 09:12

## 2022-12-02 RX ADMIN — FLUOXETINE 40 MG: 10 CAPSULE ORAL at 08:12

## 2022-12-02 RX ADMIN — GABAPENTIN 100 MG: 100 CAPSULE ORAL at 09:12

## 2022-12-02 RX ADMIN — BUPROPION HYDROCHLORIDE 300 MG: 300 TABLET, FILM COATED, EXTENDED RELEASE ORAL at 08:12

## 2022-12-02 RX ADMIN — GABAPENTIN 100 MG: 100 CAPSULE ORAL at 08:12

## 2022-12-02 RX ADMIN — CHOLECALCIFEROL TAB 125 MCG (5000 UNIT) 5000 UNITS: 125 TAB at 09:12

## 2022-12-02 RX ADMIN — MENTHOL, METHYL SALICYLATE: 10; 15 CREAM TOPICAL at 09:12

## 2022-12-02 RX ADMIN — Medication 6 MG: at 09:12

## 2022-12-02 RX ADMIN — METHOCARBAMOL 1000 MG: 500 TABLET ORAL at 09:12

## 2022-12-02 RX ADMIN — ENOXAPARIN SODIUM 40 MG: 100 INJECTION SUBCUTANEOUS at 06:12

## 2022-12-02 RX ADMIN — GABAPENTIN 100 MG: 100 CAPSULE ORAL at 02:12

## 2022-12-02 RX ADMIN — Medication 70 MG: at 10:12

## 2022-12-02 RX ADMIN — PRAVASTATIN SODIUM 20 MG: 20 TABLET ORAL at 09:12

## 2022-12-02 RX ADMIN — METHOCARBAMOL 1000 MG: 500 TABLET ORAL at 02:12

## 2022-12-02 RX ADMIN — METOPROLOL TARTRATE 25 MG: 25 TABLET, FILM COATED ORAL at 08:12

## 2022-12-02 RX ADMIN — ACETAMINOPHEN 1000 MG: 500 TABLET ORAL at 02:12

## 2022-12-02 NOTE — PT/OT/SLP PROGRESS
Physical Therapy Treatment    Patient Name:  Manisha Wynne   MRN:  [id number]  Admit Date: 11/23/2022  Admitting Diagnosis: Cervical myelopathy    General Precautions: Standard, fall   Orthopedic Precautions:spinal precautions   Braces: Aspen collar     Recommendations:     Discharge Recommendations:  home health PT   Level of Assistance Recommended at Discharge: Intermittent assistance   Discharge Equipment Recommendations: bedside commode, bath bench, wheelchair, walker, rolling   Barriers to discharge: Inaccessible home environment, Decreased caregiver support    Assessment:     Manisha Wynne is a 72 y.o. female admitted with a medical diagnosis of Cervical myelopathy .     Performance deficits affecting function:  weakness, impaired endurance, impaired functional mobility, gait instability, impaired balance, decreased lower extremity function, impaired cardiopulmonary response to activity, orthopedic precautions .    Pt tolerates session well with focus on transfers, gait training, and therex. Pt progressing well with good progression towards therapy goals. Pt compliant with spinal precautions throughout, reinforced education on safety with all mobility. Pt will continue to benefit from therapy services to address impairments listed above.      Rehab Potential is good    Activity Tolerance: Good    Plan:     Patient to be seen 6 x/week to address the above listed problems via gait training, therapeutic activities, therapeutic exercises, neuromuscular re-education, wheelchair management/training    Plan of Care Expires: 12/25/22  Plan of Care Reviewed with: patient    Subjective     Pain/Comfort:  Pain Rating 1: 7/10  Location - Orientation 1: generalized  Location 1: neck  Pain Addressed 1: Reposition, Distraction, Pre-medicate for activity  Pain Rating Post-Intervention 1: 7/10    Patient's cultural, spiritual, Latter day conflicts given the current situation:  no    Objective:     Communicated  WRITTEN HEALTHCARE DISCHARGE INFORMATION     Things that YOU are responsible for to Manage Your Care At Home:  1. Getting your prescriptions filled.  2. Taking you medications as directed. DO NOT MISS ANY DOSES!  3. Going to your follow-up doctor appointments. This is important because it allows the doctor to monitor your progress and to determine if any changes need to be made to your treatment plan.    If you are unable to make your follow up appointments, please call the number listed and reschedule this appointment.     After discharge, if you need assistance, you can call Dr. Ted Ashby at [phone]    Thank you for choosing [facility] and allowing us to care for you.   From your care management team: Mimi PINEDA,RSW & Babs SWENSON RN,TN [phone] or [phone]     You should receive a call from [facility] within 48-72 hours to help manage your care after discharge. Please try to make sure that you answer your phone for this important phone call.     Follow-up Information     Ted Ashby MD On 11/2/2017.    Specialties:  General Surgery, Bariatrics  Why:  Hospital Follow up appointment on Thursday at 9:50am.   Contact information:  [address]  [address]  [place] [address] [place]  [facility] [address]  [phone]             [facility] On 10/20/2017.    Why:  Cardiology Appointment on Friday at 8:50am  Contact information:  [address]  [facility] [address]  [phone]             [facility].    Why:  Home Health: Skilled Nurse  Contact information:  [address]  [address]  [facility] [address]  [phone]                      "with RN prior to session.  Patient found in therapy gym after treatment with FIGUEROA with cervical collar.     Therapeutic Activities and Exercises:   Seated recumbent cross  x 10 min on resistance load 2.     Functional Mobility:  Transfers:     Sit to Stand:  contact guard assistance with rolling walker  Bed to Chair: stand by assistance with  rolling walker  using  Stand Pivot  Gait: Pt ambulated 240 ft x2 trials SBA with RW. Slow aron, narrow MANOHAR, mild forward flexed posture. Steady with no overt instability or LOB.    Curb Step: Pt asc./desc. 4" curb step SBA with RW. Verbal cues needed for safety with management of RW.     AM-PAC 6 CLICK MOBILITY  20    Patient left  in bedside chair in pts room with call button in reach.    GOALS:   Multidisciplinary Problems       Physical Therapy Goals          Problem: Physical Therapy    Goal Priority Disciplines Outcome Goal Variances Interventions   Physical Therapy Goal     PT, PT/OT Ongoing, Progressing     Description: Goals to be met by: 12/25/22    Patient will increase functional independence with mobility by performing:    . Supine to sit with Modified [place]  . Sit to supine with Modified [place]  . Rolling to Left and Right with Modified [place].  . Sit to stand transfer with Supervision  . Bed to chair transfer with Supervision using Rolling Walker/no AD  . Gait  x 150 feet with Supervision using Rolling Walker.   . Wheelchair propulsion x150 feet with Modified [place] using bilateral uppper extremities  . Ascend/descend 12 stair with bilateral Handrails Supervision using No Assistive Device.   . Ascend/Descend 4 inch curb step with Supervision using Rolling Walker.                         Time Tracking:     PT Received On: 12/02/22  PT Start Time: 1140  PT Stop Time: 1210  PT Total Time (min): 30 min    Billable Minutes: Gait Training 20 and Therapeutic Exercise 10    Treatment Type: Treatment  PT/PTA: PTA     PTA Visit Number: 3 "     12/02/2022

## 2022-12-02 NOTE — PLAN OF CARE
Patient remained free of injury, trauma, or falls during  shift.   Patient is alert  and oriented x 4.   Care plan reviewed with patient, questions encouraged.   Vitals remained stable throughout shift, will continue to monitor     Problem: Adult Inpatient Plan of Care  Goal: Plan of Care Review  Outcome: Ongoing, Progressing  Flowsheets (Taken 12/1/2022 2302)  Plan of Care Reviewed With: patient  Goal: Patient-Specific Goal (Individualized)  Outcome: Ongoing, Progressing  Goal: Absence of Hospital-Acquired Illness or Injury  Outcome: Ongoing, Progressing  Intervention: Identify and Manage Fall Risk  Flowsheets (Taken 12/1/2022 2302)  Safety Promotion/Fall Prevention:   assistive device/personal item within reach   diversional activities provided   Fall Risk reviewed with patient/family   Fall Risk signage in place   lighting adjusted   medications reviewed   muscle strengthening facilitated   instructed to call staff for mobility  Goal: Optimal Comfort and Wellbeing  Outcome: Ongoing, Progressing  Intervention: Provide Person-Centered Care  Flowsheets (Taken 12/1/2022 2302)  Trust Relationship/Rapport:   care explained   choices provided   reassurance provided   questions encouraged   questions answered   empathic listening provided   thoughts/feelings acknowledged   emotional support provided  Goal: Readiness for Transition of Care  Outcome: Ongoing, Progressing     Problem: Skin Injury Risk Increased  Goal: Skin Health and Integrity  Outcome: Ongoing, Progressing     Problem: Fall Injury Risk  Goal: Absence of Fall and Fall-Related Injury  Outcome: Ongoing, Progressing

## 2022-12-02 NOTE — PT/OT/SLP PROGRESS
Occupational Therapy   Treatment    Name: Manisha Wynne  MRN: [id number]  Admit Date: 11/23/2022  Admitting Diagnosis:  Cervical myelopathy    General Precautions: Standard, fall   Orthopedic Precautions:spinal precautions   Braces: Aspen collar     Recommendations:     Discharge Recommendations: home health OT  Level of Assistance Recommended at Discharge: Intermittent assistance for ADL's and homemaking tasks  Discharge Equipment Recommendations:  3-in-1 commode, bath bench, wheelchair, walker, rolling  Barriers to discharge:  Inaccessible home environment    Assessment:     Manisha Wynne is a 72 y.o. female with a medical diagnosis of Cervical myelopathy.  She presents with limitations in performance of self-care, functional mobility, and ADLs. Performance deficits affecting function are weakness, impaired endurance, impaired self care skills, impaired functional mobility, gait instability, impaired balance, decreased lower extremity function, orthopedic precautions, impaired cardiopulmonary response to activity.   Pt tolerated Tx without incident and is making progress but continues to require assist to perform self care tasks, functional mobility and functional transfers . She would continue to benefit from OT intervention to further her functional (I)ce and safety.    Rehab Potential is good    Activity tolerance:  Good    Plan:     Patient to be seen 5 x/week to address the above listed problems via self-care/home management, therapeutic exercises, therapeutic activities    Plan of Care Expires: 12/24/22  Plan of Care Reviewed with: patient    Subjective     Communicated with: Nurse prior to session.    Pain/Comfort:  Pain Rating 1: 5/10  Location - Orientation 1: generalized  Location 1: neck  Pain Addressed 1: Reposition, Distraction, Pre-medicate for activity  Pain Rating Post-Intervention 1: 7/10    Patient's cultural, spiritual, [employer] conflicts given the current  situation:  no    Objective:     Patient found HOB elevated with cervical collar upon OT entry to room.    Bed Mobility:    Patient completed Scooting to EOB from HOB that was elevated already with stand by assistance     Functional Mobility/Transfers:  Patient completed Sit <> Stand Transfer with stand by assistance  with  rolling walker   Patient completed Toilet Transfer Step Transfer technique from EOB to toilet in room with stand by assistance with  rolling walker  Functional Mobility: Pt ambulated around room with SBA and RW. Pt propelled W/C from room to therapy gym with sup for a total of 125 ft using BUE to propel chair.    Activities of Daily Living:  Grooming: supervision standing sinkside with RW with set up   Upper Body Dressing: stand by assistance with v/c to clasp bra in front of body and turn bra forward   Lower Body Dressing: stand by assistance sitting EOB and with RW to stand to manage pants with set up  Toileting: stand by assistance using bedside commode positioned over toilet.     [facility] 6 Click ADL: 20    OT Exercises: Pt attempted upper body ergometer with cessation of activity secondary to pain in neck     Treatment & Education:  Pt participated in standing activity with sup and RW. Pt at raised counter to perform fine motor and visual scanning activity with focus on standing tolerance, functional reaching, dynamic standing bal, crossing midline, and to promote independence with homemaking and self care tasks. Pt tolerated standing for 4 min and 50 sec     Pt educated on role of OT, safety while performing functional transfers, safety while performing self care tasks, orthopedic precautions, importance to adhering to precautions and progress towards OT goals    Patient left up in chair with  rehab technician present    GOALS:   Multidisciplinary Problems       Occupational Therapy Goals          Problem: Occupational Therapy    Goal Priority Disciplines Outcome Interventions   Occupational  Therapy Goal     OT, PT/OT Ongoing, Progressing    Description: Goals to be met by: 12/24/22     Patient will increase functional independence with ADLs by performing:    UE Dressing with Modified [place].  LE Dressing with Modified [place].  Grooming while standing at sink with Modified [place].  Toileting from toilet with Modified [place] for hygiene and clothing management.   Bathing from  shower chair/bench with Supervision.  Step transfer with Modified [place]  Upper extremity exercise program with supervision.  Caregiver will be educated on level of assistance required to safely perform self care and functional transfers.                         Time Tracking:     OT Date of Treatment: 12/02/22  OT Start Time: 0945    OT Stop Time: 1026  OT Total Time (min): 41 min    Billable Minutes:Self Care/Home Management 26  Therapeutic Activity 15    12/2/2022

## 2022-12-03 PROCEDURE — 11000004 HC SNF PRIVATE

## 2022-12-03 PROCEDURE — 63600175 PHARM REV CODE 636 W HCPCS: Performed by: NURSE PRACTITIONER

## 2022-12-03 PROCEDURE — 25000003 PHARM REV CODE 250: Performed by: NURSE PRACTITIONER

## 2022-12-03 PROCEDURE — 25000242 PHARM REV CODE 250 ALT 637 W/ HCPCS: Performed by: HOSPITALIST

## 2022-12-03 PROCEDURE — 25000003 PHARM REV CODE 250: Performed by: HOSPITALIST

## 2022-12-03 RX ADMIN — PRAVASTATIN SODIUM 20 MG: 20 TABLET ORAL at 10:12

## 2022-12-03 RX ADMIN — BUPROPION HYDROCHLORIDE 300 MG: 300 TABLET, FILM COATED, EXTENDED RELEASE ORAL at 10:12

## 2022-12-03 RX ADMIN — GABAPENTIN 100 MG: 100 CAPSULE ORAL at 10:12

## 2022-12-03 RX ADMIN — FLUOXETINE 40 MG: 10 CAPSULE ORAL at 10:12

## 2022-12-03 RX ADMIN — ENOXAPARIN SODIUM 40 MG: 100 INJECTION SUBCUTANEOUS at 05:12

## 2022-12-03 RX ADMIN — METOPROLOL TARTRATE 25 MG: 25 TABLET, FILM COATED ORAL at 10:12

## 2022-12-03 RX ADMIN — MENTHOL, METHYL SALICYLATE: 10; 15 CREAM TOPICAL at 10:12

## 2022-12-03 RX ADMIN — GABAPENTIN 100 MG: 100 CAPSULE ORAL at 02:12

## 2022-12-03 RX ADMIN — METHOCARBAMOL 1000 MG: 500 TABLET ORAL at 02:12

## 2022-12-03 RX ADMIN — METHOCARBAMOL 1000 MG: 500 TABLET ORAL at 06:12

## 2022-12-03 RX ADMIN — Medication 70 MG: at 10:12

## 2022-12-03 RX ADMIN — ACETAMINOPHEN 1000 MG: 500 TABLET ORAL at 10:12

## 2022-12-03 RX ADMIN — Medication 1 TABLET: at 10:12

## 2022-12-03 RX ADMIN — ACETAMINOPHEN 1000 MG: 500 TABLET ORAL at 02:12

## 2022-12-03 RX ADMIN — Medication 6 MG: at 10:12

## 2022-12-03 RX ADMIN — LOSARTAN POTASSIUM 25 MG: 25 TABLET, FILM COATED ORAL at 10:12

## 2022-12-03 RX ADMIN — ACETAMINOPHEN 1000 MG: 500 TABLET ORAL at 06:12

## 2022-12-03 RX ADMIN — OXYCODONE HYDROCHLORIDE 15 MG: 10 TABLET ORAL at 10:12

## 2022-12-03 RX ADMIN — CHOLECALCIFEROL TAB 125 MCG (5000 UNIT) 5000 UNITS: 125 TAB at 10:12

## 2022-12-03 NOTE — PLAN OF CARE
Patient remained free of injury, trauma, or falls during  shift.   Patient is alert  and oriented x 4.   Care plan reviewed with patient, questions encouraged.   Vitals remained stable throughout shift, will continue to monitor     Problem: Adult Inpatient Plan of Care  Goal: Plan of Care Review  Outcome: Ongoing, Progressing  Flowsheets (Taken 12/3/2022 0118)  Plan of Care Reviewed With: patient  Goal: Patient-Specific Goal (Individualized)  Outcome: Ongoing, Progressing  Goal: Absence of Hospital-Acquired Illness or Injury  Outcome: Ongoing, Progressing  Intervention: Identify and Manage Fall Risk  Flowsheets (Taken 12/3/2022 0118)  Safety Promotion/Fall Prevention:   assistive device/personal item within reach   diversional activities provided   Fall Risk reviewed with patient/family   Fall Risk signage in place   lighting adjusted   side rails raised x 2   instructed to call staff for mobility  Goal: Optimal Comfort and Wellbeing  Outcome: Ongoing, Progressing  Intervention: Provide Person-Centered Care  Flowsheets (Taken 12/3/2022 0118)  Trust Relationship/Rapport:   care explained   questions encouraged   choices provided   reassurance provided   emotional support provided   thoughts/feelings acknowledged   empathic listening provided   questions answered  Goal: Readiness for Transition of Care  Outcome: Ongoing, Progressing     Problem: Skin Injury Risk Increased  Goal: Skin Health and Integrity  Outcome: Ongoing, Progressing     Problem: Fall Injury Risk  Goal: Absence of Fall and Fall-Related Injury  Outcome: Ongoing, Progressing

## 2022-12-03 NOTE — PLAN OF CARE
Problem: Adult Inpatient Plan of Care  Goal: Readiness for Transition of Care  Outcome: Ongoing, Progressing     Problem: Adult Inpatient Plan of Care  Goal: Readiness for Transition of Care  Outcome: Ongoing, Progressing     Problem: Fall Injury Risk  Goal: Absence of Fall and Fall-Related Injury  Outcome: Ongoing, Progressing

## 2022-12-04 PROCEDURE — 11000004 HC SNF PRIVATE

## 2022-12-04 PROCEDURE — 97110 THERAPEUTIC EXERCISES: CPT | Mod: CQ

## 2022-12-04 PROCEDURE — 63600175 PHARM REV CODE 636 W HCPCS: Performed by: NURSE PRACTITIONER

## 2022-12-04 PROCEDURE — 25000242 PHARM REV CODE 250 ALT 637 W/ HCPCS: Performed by: HOSPITALIST

## 2022-12-04 PROCEDURE — 25000003 PHARM REV CODE 250: Performed by: HOSPITALIST

## 2022-12-04 PROCEDURE — 97116 GAIT TRAINING THERAPY: CPT | Mod: CQ

## 2022-12-04 PROCEDURE — 97530 THERAPEUTIC ACTIVITIES: CPT | Mod: CQ

## 2022-12-04 PROCEDURE — 25000003 PHARM REV CODE 250: Performed by: NURSE PRACTITIONER

## 2022-12-04 RX ADMIN — OXYCODONE HYDROCHLORIDE 15 MG: 10 TABLET ORAL at 02:12

## 2022-12-04 RX ADMIN — Medication 70 MG: at 10:12

## 2022-12-04 RX ADMIN — GABAPENTIN 100 MG: 100 CAPSULE ORAL at 10:12

## 2022-12-04 RX ADMIN — MENTHOL, METHYL SALICYLATE: 10; 15 CREAM TOPICAL at 10:12

## 2022-12-04 RX ADMIN — ACETAMINOPHEN 1000 MG: 500 TABLET ORAL at 10:12

## 2022-12-04 RX ADMIN — ACETAMINOPHEN 1000 MG: 500 TABLET ORAL at 05:12

## 2022-12-04 RX ADMIN — BUPROPION HYDROCHLORIDE 300 MG: 300 TABLET, FILM COATED, EXTENDED RELEASE ORAL at 10:12

## 2022-12-04 RX ADMIN — LOSARTAN POTASSIUM 25 MG: 25 TABLET, FILM COATED ORAL at 10:12

## 2022-12-04 RX ADMIN — METOPROLOL TARTRATE 25 MG: 25 TABLET, FILM COATED ORAL at 10:12

## 2022-12-04 RX ADMIN — CHOLECALCIFEROL TAB 125 MCG (5000 UNIT) 5000 UNITS: 125 TAB at 10:12

## 2022-12-04 RX ADMIN — OXYCODONE HYDROCHLORIDE 15 MG: 10 TABLET ORAL at 10:12

## 2022-12-04 RX ADMIN — GABAPENTIN 100 MG: 100 CAPSULE ORAL at 02:12

## 2022-12-04 RX ADMIN — FLUOXETINE 40 MG: 10 CAPSULE ORAL at 10:12

## 2022-12-04 RX ADMIN — PRAVASTATIN SODIUM 20 MG: 20 TABLET ORAL at 10:12

## 2022-12-04 RX ADMIN — ENOXAPARIN SODIUM 40 MG: 100 INJECTION SUBCUTANEOUS at 06:12

## 2022-12-04 RX ADMIN — Medication 1 TABLET: at 10:12

## 2022-12-04 NOTE — PT/OT/SLP PROGRESS
"Physical Therapy Treatment    Patient Name:  Manisha Wynne   MRN:  [id number]  Admit Date: 11/23/2022  Admitting Diagnosis: Cervical myelopathy    General Precautions: Standard, fall   Orthopedic Precautions:spinal precautions   Braces: Aspen collar     Recommendations:     Discharge Recommendations:  home health PT   Level of Assistance Recommended at Discharge: Intermittent assistance   Discharge Equipment Recommendations: bedside commode, bath bench, wheelchair, walker, rolling   Barriers to discharge: Inaccessible home environment, Decreased caregiver support    Assessment:     Manisha Wynne is a 72 y.o. female admitted with a medical diagnosis of Cervical myelopathy .     Pt was agreeable and tolerate therapy session well. Pt ambulated with SBA and RW with no LOB noted. Pt completed stairs and therex activities with no issues. Pt is motivated and progressing well. Pt continues to benefit from therapy to achieve highest level of independence prior to discharge.     Performance deficits affecting function:  weakness, impaired endurance, impaired functional mobility, gait instability, impaired balance, decreased lower extremity function, impaired cardiopulmonary response to activity, orthopedic precautions .    Rehab Potential is good    Activity Tolerance: Good    Plan:     Patient to be seen 6 x/week to address the above listed problems via gait training, therapeutic activities, therapeutic exercises, neuromuscular re-education, wheelchair management/training    Plan of Care Expires: 12/25/22  Plan of Care Reviewed with: patient    Subjective     "I'm glad you're here".     Pain/Comfort:  Pain Rating 1: 4/10  Location - Orientation 1: posterior  Location 1: neck  Pain Addressed 1: Pre-medicate for activity, Reposition, Distraction  Pain Rating Post-Intervention 1: 5/10    Patient's cultural, spiritual, Church conflicts given the current situation:  no    Objective:     Patient found up in " chair with cervical collar upon PT entry to room.     Therapeutic Activities and Exercises:   x10 reps, mini squats (with ball against wall)   CGA for safety   x2 reps, kneeling up/down on blue pad   1 UE on wheelchair and 1 UE on RW  Verbal cues for hand placement and sequencing  Slightly unsteady noted, BLE weakness noted  x2 reps, reaching down with RW  CGA for safety and verbal cues for positioning  Recumbent cross  x10 mins (seat 7, level 3)    Patient educated on role of therapy, goals of session, and benefits of out of bed mobility.   Instructed on use of call button and importance of calling nursing staff for assistance with mobility   Questions/concerns addressed within PTA scope of practice  Pt verbalized understanding.    Functional Mobility:  Transfers:   Sit <> Stand Transfer: supervision with rolling walker   Bed <> Chair Transfer: stand by assistance with rolling walker using Step Transfer technique   Gait:  Pt ambulated ~200ft+90ft with stand by assistance and rolling walker.  Gait Pattern observed: reciprocal gait  Gait Deviation(s): narrow base of support and decreased aron  Verbal/tactile cues for upright posture and gaze direction   No LOB noted.   Wheelchair Propulsion:    Pt propelled Standard wheelchair x ~140 feet on Level tile with  Bilateral upper extremity with Modified Independent.   Stairs:  Pt ascended/descended  4 steps (x3 consecutive trial)  with 1 handrail with Stand-by Assistance.   Reciprocal gait ascending and step-to gait descending  2x with RUE and 1x with LUE    AM-PAC 6 CLICK MOBILITY  21    Patient left up in chair with call button in reach.    GOALS:   Multidisciplinary Problems       Physical Therapy Goals          Problem: Physical Therapy    Goal Priority Disciplines Outcome Goal Variances Interventions   Physical Therapy Goal     PT, PT/OT Ongoing, Progressing     Description: Goals to be met by: 12/25/22    Patient will increase functional independence with  mobility by performing:    . Supine to sit with Modified [place]  . Sit to supine with Modified [place]  . Rolling to Left and Right with Modified [place].  . Sit to stand transfer with Supervision  . Bed to chair transfer with Supervision using Rolling Walker/no AD  . Gait  x 150 feet with Supervision using Rolling Walker.   . Wheelchair propulsion x150 feet with Modified [place] using bilateral uppper extremities  . Ascend/descend 12 stair with bilateral Handrails Supervision using No Assistive Device.   . Ascend/Descend 4 inch curb step with Supervision using Rolling Walker.                         Time Tracking:     PT Received On: 12/04/22  PT Start Time: 1427  PT Stop Time: 1510  PT Total Time (min): 43 min    Billable Minutes: Gait Training 15, Therapeutic Activity 10, and Therapeutic Exercise 18    Treatment Type: Treatment  PT/PTA: PTA     PTA Visit Number: 4     12/04/2022

## 2022-12-04 NOTE — PLAN OF CARE
Problem: Adult Inpatient Plan of Care  Goal: Patient-Specific Goal (Individualized)  12/4/2022 1635 by Yolanda Butler LPN  Outcome: Ongoing, Progressing  12/4/2022 1403 by Yolanda Butler LPN  Outcome: Ongoing, Progressing     Problem: Adult Inpatient Plan of Care  Goal: Optimal Comfort and Wellbeing  12/4/2022 1635 by Yolanda Butler LPN  Outcome: Ongoing, Progressing  12/4/2022 1403 by Yolanda Butler LPN  Outcome: Ongoing, Progressing     Problem: Adult Inpatient Plan of Care  Goal: Readiness for Transition of Care  12/4/2022 1635 by Yolanda Butler LPN  Outcome: Ongoing, Progressing

## 2022-12-04 NOTE — PLAN OF CARE
Problem: Adult Inpatient Plan of Care  Goal: Patient-Specific Goal (Individualized)  Outcome: Ongoing, Progressing     Problem: Adult Inpatient Plan of Care  Goal: Absence of Hospital-Acquired Illness or Injury  Outcome: Ongoing, Progressing     Problem: Adult Inpatient Plan of Care  Goal: Optimal Comfort and Wellbeing  Outcome: Ongoing, Progressing     Problem: Adult Inpatient Plan of Care  Goal: Absence of Hospital-Acquired Illness or Injury  Outcome: Ongoing, Progressing

## 2022-12-05 LAB
ANION GAP SERPL CALC-SCNC: 8 MMOL/L (ref 8–16)
BASOPHILS # BLD AUTO: 0.03 K/UL (ref 0–0.2)
BASOPHILS NFR BLD: 0.6 % (ref 0–1.9)
BUN SERPL-MCNC: 16 MG/DL (ref 8–23)
CALCIUM SERPL-MCNC: 9.5 MG/DL (ref 8.7–10.5)
CHLORIDE SERPL-SCNC: 106 MMOL/L (ref 95–110)
CO2 SERPL-SCNC: 25 MMOL/L (ref 23–29)
CREAT SERPL-MCNC: 0.8 MG/DL (ref 0.5–1.4)
DIFFERENTIAL METHOD: ABNORMAL
EOSINOPHIL # BLD AUTO: 0.2 K/UL (ref 0–0.5)
EOSINOPHIL NFR BLD: 4.3 % (ref 0–8)
ERYTHROCYTE [DISTWIDTH] IN BLOOD BY AUTOMATED COUNT: 13.5 % (ref 11.5–14.5)
EST. GFR  (NO RACE VARIABLE): >60 ML/MIN/1.73 M^2
GLUCOSE SERPL-MCNC: 73 MG/DL (ref 70–110)
HCT VFR BLD AUTO: 29.8 % (ref 37–48.5)
HGB BLD-MCNC: 9.1 G/DL (ref 12–16)
IMM GRANULOCYTES # BLD AUTO: 0.03 K/UL (ref 0–0.04)
IMM GRANULOCYTES NFR BLD AUTO: 0.6 % (ref 0–0.5)
LYMPHOCYTES # BLD AUTO: 1.7 K/UL (ref 1–4.8)
LYMPHOCYTES NFR BLD: 34.4 % (ref 18–48)
MAGNESIUM SERPL-MCNC: 2 MG/DL (ref 1.6–2.6)
MCH RBC QN AUTO: 29.4 PG (ref 27–31)
MCHC RBC AUTO-ENTMCNC: 30.5 G/DL (ref 32–36)
MCV RBC AUTO: 96 FL (ref 82–98)
MONOCYTES # BLD AUTO: 0.8 K/UL (ref 0.3–1)
MONOCYTES NFR BLD: 15.3 % (ref 4–15)
NEUTROPHILS # BLD AUTO: 2.2 K/UL (ref 1.8–7.7)
NEUTROPHILS NFR BLD: 44.8 % (ref 38–73)
NRBC BLD-RTO: 0 /100 WBC
PHOSPHATE SERPL-MCNC: 4.4 MG/DL (ref 2.7–4.5)
PLATELET # BLD AUTO: 205 K/UL (ref 150–450)
PMV BLD AUTO: 10.5 FL (ref 9.2–12.9)
POTASSIUM SERPL-SCNC: 4.6 MMOL/L (ref 3.5–5.1)
RBC # BLD AUTO: 3.1 M/UL (ref 4–5.4)
SODIUM SERPL-SCNC: 139 MMOL/L (ref 136–145)
WBC # BLD AUTO: 4.89 K/UL (ref 3.9–12.7)

## 2022-12-05 PROCEDURE — 36415 COLL VENOUS BLD VENIPUNCTURE: CPT | Performed by: HOSPITALIST

## 2022-12-05 PROCEDURE — 97530 THERAPEUTIC ACTIVITIES: CPT | Mod: CO

## 2022-12-05 PROCEDURE — 97110 THERAPEUTIC EXERCISES: CPT

## 2022-12-05 PROCEDURE — 11000004 HC SNF PRIVATE

## 2022-12-05 PROCEDURE — 80048 BASIC METABOLIC PNL TOTAL CA: CPT | Performed by: HOSPITALIST

## 2022-12-05 PROCEDURE — 25000003 PHARM REV CODE 250: Performed by: HOSPITALIST

## 2022-12-05 PROCEDURE — 97535 SELF CARE MNGMENT TRAINING: CPT | Mod: CO

## 2022-12-05 PROCEDURE — 84100 ASSAY OF PHOSPHORUS: CPT | Performed by: HOSPITALIST

## 2022-12-05 PROCEDURE — 83735 ASSAY OF MAGNESIUM: CPT | Performed by: HOSPITALIST

## 2022-12-05 PROCEDURE — 25000003 PHARM REV CODE 250: Performed by: NURSE PRACTITIONER

## 2022-12-05 PROCEDURE — 97116 GAIT TRAINING THERAPY: CPT

## 2022-12-05 PROCEDURE — 25000242 PHARM REV CODE 250 ALT 637 W/ HCPCS: Performed by: HOSPITALIST

## 2022-12-05 PROCEDURE — 63600175 PHARM REV CODE 636 W HCPCS: Performed by: NURSE PRACTITIONER

## 2022-12-05 PROCEDURE — 85025 COMPLETE CBC W/AUTO DIFF WBC: CPT | Performed by: HOSPITALIST

## 2022-12-05 RX ADMIN — Medication 70 MG: at 10:12

## 2022-12-05 RX ADMIN — METOPROLOL TARTRATE 25 MG: 25 TABLET, FILM COATED ORAL at 10:12

## 2022-12-05 RX ADMIN — BUPROPION HYDROCHLORIDE 300 MG: 300 TABLET, FILM COATED, EXTENDED RELEASE ORAL at 10:12

## 2022-12-05 RX ADMIN — ACETAMINOPHEN 1000 MG: 500 TABLET ORAL at 05:12

## 2022-12-05 RX ADMIN — Medication 1 TABLET: at 08:12

## 2022-12-05 RX ADMIN — LOSARTAN POTASSIUM 25 MG: 25 TABLET, FILM COATED ORAL at 10:12

## 2022-12-05 RX ADMIN — PRAVASTATIN SODIUM 20 MG: 20 TABLET ORAL at 08:12

## 2022-12-05 RX ADMIN — GABAPENTIN 100 MG: 100 CAPSULE ORAL at 10:12

## 2022-12-05 RX ADMIN — SENNOSIDES AND DOCUSATE SODIUM 1 TABLET: 50; 8.6 TABLET ORAL at 10:12

## 2022-12-05 RX ADMIN — Medication 6 MG: at 08:12

## 2022-12-05 RX ADMIN — GABAPENTIN 100 MG: 100 CAPSULE ORAL at 08:12

## 2022-12-05 RX ADMIN — OXYCODONE HYDROCHLORIDE 15 MG: 10 TABLET ORAL at 08:12

## 2022-12-05 RX ADMIN — FLUOXETINE 40 MG: 10 CAPSULE ORAL at 10:12

## 2022-12-05 RX ADMIN — METOPROLOL TARTRATE 25 MG: 25 TABLET, FILM COATED ORAL at 08:12

## 2022-12-05 RX ADMIN — ENOXAPARIN SODIUM 40 MG: 100 INJECTION SUBCUTANEOUS at 05:12

## 2022-12-05 RX ADMIN — GABAPENTIN 100 MG: 100 CAPSULE ORAL at 02:12

## 2022-12-05 RX ADMIN — MENTHOL, METHYL SALICYLATE: 10; 15 CREAM TOPICAL at 09:12

## 2022-12-05 RX ADMIN — ACETAMINOPHEN 1000 MG: 500 TABLET ORAL at 02:12

## 2022-12-05 RX ADMIN — CHOLECALCIFEROL TAB 125 MCG (5000 UNIT) 5000 UNITS: 125 TAB at 08:12

## 2022-12-05 NOTE — PLAN OF CARE
Problem: Physical Therapy  Goal: Physical Therapy Goal  Description: Goals to be met by: 12/25/22    Patient will increase functional independence with mobility by performing:    . Supine to sit with Modified [place]  . Sit to supine with Modified [place]  . Rolling to Left and Right with Modified [place].  . Sit to stand transfer with Supervision - Met 12/5  Updated Goal: Sit to stand transfer with Mod I using RW    . Bed to chair transfer with Supervision using Rolling Walker/no AD - Met 12/5  Updated Goal: Bed to chair transfer with Mod I using RW    . Gait  x 150 feet with Supervision using Rolling Walker.   . Wheelchair propulsion x150 feet with Modified [place] using bilateral uppper extremities  . Ascend/descend 12 stair with bilateral Handrails Supervision using No Assistive Device.   . Ascend/Descend 4 inch curb step with Supervision using Rolling Walker.    Outcome: Ongoing, Progressing

## 2022-12-05 NOTE — PROGRESS NOTES
[facility]                                  Skilled Nursing Facility                   Progress Note     Patient Name: Manisha Wynne  YOB: 1950  MRN: [id number]  Room: [place] [address] [address] A     Admit Date: 11/23/2022   CARO: 12/14/2022     Principal Problem: Cervical myelopathy    HPI obtained from patient interview and chart review     Chief Complaint:    Re-evaluation of medical treatment and therapy status: Lab review        HPI:   Manisha Wynne is a 72 y.o. female with PMH of cervical stenosis, myelopathy, and hypertension. She has an MRI-non-compatible pacemaker in place. s/p C3-C6 posterior cervical fusion on 11/14 by Dr. Cheek.     Patient will be treated at [facility] with PT and OT to improve functional status and ability to perform ADLs.     Interval History  All of today's labs reviewed and are listed below. No critical values noted. Discussed results with patient. 24 hr vital sign ranges listed below. Pain regimen tolerated. Patient denies shortness of breath, abdominal discomfort, nausea, or vomiting.  Patient reports an adequate appetite.  Patient denies dysuria.  Patient reports having regular bowel movements.  Patient progessing with PT/OT: .Gait: Patient ambulated 200 feet on level surface using RW with SBA. Patient ambulated 10 feet x 2 trials on unlevel mat using RW with SBA.  Continuing to follow and treat all acute and chronic conditions.    Past Medical History: Patient has a past medical history of Abnormal Pap smear, Atrial fibrillation, AV block, 1st degree (07/25/2012), C. difficile diarrhea, Cataract, Depression (07/24/2012), Facet arthritis of lumbar region (03/31/2015), Falls, Hyperlipidemia, Hypertension (07/24/2012), Neuropathy, Other specified cardiac dysrhythmias(427.89), Sleep apnea, Syncope (07/24/2012), and Tremors of nervous system.    Past Surgical History: Patient has a past  surgical history that includes Tonsillectomy; Dilation and curettage of uterus; Cardiac pacemaker placement (09/07/2012); Endoscopic ultrasound of upper gastrointestinal tract (N/A, 7/5/2019); [place] tooth extraction; Nasal septoplasty (N/A, 12/19/2019); Application of cartilage graft (Bilateral, 12/19/2019); Surgical removal of nasal turbinate (Bilateral, 12/19/2019); Open reduction and internal fixation (ORIF) of fracture of distal radius (Left, 1/24/2020); Myelography (N/A, 5/4/2021); Cataract extraction w/  intraocular lens implant (Right, 5/16/2022); Cataract extraction w/  intraocular lens implant (Left, 6/20/2022); Replacement of pacemaker generator (Left, 10/7/2022); and Posterior fusion of cervical spine with laminectomy (N/A, 11/14/2022).    Social History: Patient reports that she quit smoking about 40 years ago. Her smoking use included cigarettes. She has a 3.75 pack-year smoking history. She has quit using smokeless tobacco. She reports current alcohol use. She reports that she does not use drugs.    Family History:  family history is not on file. She was adopted.    Allergies: Patient has No Known Allergies.        Review of Systems   Constitutional:  Positive for malaise/fatigue. Negative for chills and fever.   HENT:  Negative for congestion, hearing loss and sore throat.    Eyes:  Negative for blurred vision and double vision.   Respiratory:  Negative for cough, sputum production, shortness of breath and wheezing.    Cardiovascular:  Negative for chest pain, palpitations and leg swelling.   Gastrointestinal:  Negative for abdominal pain, constipation, diarrhea, heartburn, nausea and vomiting.   Genitourinary:  Negative for dysuria and urgency.   Musculoskeletal:  Negative for back pain.   Skin:  Negative for rash.        + wound   Neurological:  Positive for weakness. Negative for dizziness and headaches.   Endo/Heme/Allergies:  Negative for polydipsia. Does not bruise/bleed easily.    Psychiatric/Behavioral:  Negative for depression and hallucinations. The patient is not nervous/anxious.        24 hour Vital Sign Range   Temp:  [97.8 °F (36.6 °C)-97.9 °F (36.6 °C)]   Pulse:  [63]   Resp:  [16-18]   BP: (124-143)/(59-64)   SpO2:  [97 %]       Physical Exam  Vitals and nursing note reviewed.   Constitutional:       General: She is not in acute distress.     Appearance: Normal appearance. She is not ill-appearing.   HENT:      Head: Normocephalic and atraumatic.      Nose: Nose normal. No congestion.      Mouth/Throat:      Mouth: Mucous membranes are moist.      Pharynx: Oropharynx is clear.   Eyes:      Extraocular Movements: Extraocular movements intact.      Conjunctiva/sclera: Conjunctivae normal.      Pupils: Pupils are equal, round, and reactive to light.   Neck:      Comments: C-collar in place    Cardiovascular:      Rate and Rhythm: Normal rate and regular rhythm.      Pulses: Normal pulses.      Heart sounds: Normal heart sounds. No murmur heard.  Pulmonary:      Effort: Pulmonary effort is normal. No respiratory distress.      Breath sounds: Normal breath sounds. No wheezing or rhonchi.   Abdominal:      General: Bowel sounds are normal. There is no distension.      Palpations: Abdomen is soft. There is no mass.      Tenderness: There is no abdominal tenderness.   Musculoskeletal:         General: No swelling or tenderness.      Cervical back: Normal range of motion and neck supple. No tenderness.      Right lower leg: No edema.      Left lower leg: No edema.   Skin:     General: Skin is warm and dry.      Capillary Refill: Capillary refill takes less than 2 seconds.      Findings: No erythema or rash.   Neurological:      Mental Status: She is alert and oriented to person, place, and time. Mental status is at baseline.      Motor: Weakness present.   Psychiatric:         Mood and Affect: Mood normal.         Behavior: Behavior normal.         Thought Content: Thought content normal.          Judgment: Judgment normal.     Full skin assessment completed by this NP           Incision/Site 06/25/15 1053 Left back (Active)   06/25/15 1053   Present Prior to Hospital Arrival?:    Side: Left   Location: back   Orientation:    Incision Type:    Closure Method:    Additional Comments:    Removal Indication and Assessment:    Wound Outcome:    Removal Indications:    Number of days: 2714            Incision/Site 11/14/22 1813 Neck (Active)   11/14/22 1813   Present Prior to Hospital Arrival?:    Side:    Location: Neck   Orientation:    Incision Type:    Closure Method:    Additional Comments:    Removal Indication and Assessment:    Wound Outcome:    Removal Indications:    Incision WDL ex 11/28/22 0950   Dressing Appearance Open to air 11/28/22 2126   Drainage Amount None 11/28/22 2126   Drainage Characteristics/Odor No odor 11/28/22 2126   Appearance Intact;Steri-strips intact 11/28/22 2126   Periwound Area Intact;Dry;Pink;Edematous 11/28/22 2126   Wound Edges Approximated 11/28/22 2126   Dressing Other (comment) 11/26/22 1001   Number of days: 15          Labs:  Recent Labs   Lab 12/01/22  0436 12/05/22  0634   WBC 6.14 4.89   HGB 9.4* 9.1*   HCT 29.6* 29.8*    205       Recent Labs   Lab 12/01/22 0436 12/05/22  0634    139   K 4.7 4.6    106   CO2 25  --    BUN 18  --    CREATININE 0.9  --    GLU 88 73   CALCIUM 9.2 9.5   MG 1.9  --    PHOS 3.9  --        No results for input(s): ALKPHOS, ALT, AST, ALBUMIN, PROT, BILITOT, INR in the last 168 hours.  No results for input(s): POCTGLUCOSE in the last 72 hours.    Meds Scheduled:   acetaminophen  1,000 mg Oral Q8H    buPROPion  300 mg Oral Daily    calcium-vitamin D3  1 tablet Oral QHS    cholecalciferol (vitamin D3)  5,000 Units Oral QHS    enoxaparin  40 mg Subcutaneous Daily    FLUoxetine  40 mg Oral Daily    gabapentin  100 mg Oral TID    lisdexamfetamine  70 mg Oral QAM    losartan  25 mg Oral Daily    methyl salicylate-menthol  15-10%   Topical (Top) QHS    metoprolol tartrate  25 mg Oral BID    pravastatin  20 mg Oral QHS       PRN:   acetaminophen, calcium carbonate, dicyclomine, melatonin, oxyCODONE, senna-docusate 8.6-50 mg      Assessment and Plan:    Cervical myelopathy  cervical myelopathy s/p C3-C6 posterior cervical fusion on 11/14 by Dr. Cheek    - HELEN collar to be worn when upright and OOB  - Pain control- Initiate order to increase oxycodone to 15 q 4 hours prn severe pain. Increased gabapentin to 200 mg tid. Continue scheduled robaxin and tylenol.   - Bowel regimen: senna BID and miralax daily. 11/28 - -Atelectasis prevention: IS hourly while awake, PT/OT, OOB > 6 hours per day  -DVT PPx- Lovenox 40mg daily     Hypertension   continue home meds. Metoprolol 25mg, Losartan 25mg    Anticipate disposition:    Home with home health      Follow-up needed during SNF admission:       Follow-up needed after discharge from SNF:   - PCP within 1-2 weeks  - See appt scheduled below     Future Appointments   Date Time Provider Department Center   12/22/2022 10:00 AM [facility] OIC-XRAY [facility] XRAY [facility]   12/22/2022 11:00 AM Nicolette Cheek MD [facility] NEUROS8 [facility]   1/5/2023 10:00 AM HOME MONITOR DEVICE CHECK, [facility] ARRHPRO [facility]   1/10/2023  8:45 AM EKG, APPT [facility] EKG [facility]   1/10/2023  9:00 AM COORDINATED DEVICE CHECK [facility] ARRHPRO [facility]   1/10/2023  9:30 AM Svetlana Miner NP [facility] ARRHYTH [facility]   2/2/2023 11:30 AM Sunitha Rosario PA-C [facility] NEURO8 [facility]   2/10/2023 10:00 AM LAB, APPOINTMENT [facility] INTMED [facility] LAB IM [facility]   2/23/2023  9:15 AM INJECTION [facility] AMB INF [facility]         I certify that SNF services are required to be given on an inpatient basis because Manisha Wynne needs for skilled nursing care and/or skilled rehabilitation are required on a daily basis and such services can only practically be provided in a skilled nursing facility setting and are for an ongoing condition for  which she received inpatient care in the hospital.            Angely De La Rosa NP  Department of Hospital Medicine   [facility]     DOS: 12/5/2022       Patient note was created using MModal Dictation.  Any errors in syntax or even information may not have been identified and edited on initial review prior to signing this note.

## 2022-12-05 NOTE — PLAN OF CARE
Problem: Adult Inpatient Plan of Care  Goal: Readiness for Transition of Care  Outcome: Ongoing, Progressing     Problem: Adult Inpatient Plan of Care  Goal: Optimal Comfort and Wellbeing  Outcome: Ongoing, Progressing     POC reviewed with the patient and the patient  verbalized understanding. All comments and concerns addressed. Bed locked in lowest position with bed alarm set, call light within reach. Safety precautions maintained. VSS, see flowsheets. No events this shift. Will continue to monitor for changes to POC and clinical condition.

## 2022-12-05 NOTE — PT/OT/SLP PROGRESS
Occupational Therapy   Treatment    Name: Manisha Wynne  MRN: [id number]  Admit Date: 11/23/2022  Admitting Diagnosis:  Cervical myelopathy    General Precautions: Standard, fall   Orthopedic Precautions: spinal precautions   Braces: Aspen collar    Recommendations:     Discharge Recommendations:  home health OT  Level of Assistance Recommended at Discharge: Intermittent assistance for ADL's and homemaking tasks  Discharge Equipment Recommendations: 3-in-1 commode, bath bench, wheelchair, walker, rolling  Barriers to discharge:  Inaccessible home environment    Assessment:     Manisha Wynne is a 72 y.o. female with a medical diagnosis of Cervical myelopathy.  She presents with limitations in performance of self-care, functional mobility, and ADLs. Performance deficits affecting function are weakness, impaired endurance, impaired self care skills, impaired functional mobility, gait instability, impaired balance, decreased lower extremity function, orthopedic precautions, impaired cardiopulmonary response to activity.   Pt tolerated Tx without incident and is making progress but continues to require assist to perform self care tasks, functional mobility and functional transfers . She would continue to benefit from OT intervention to further her functional (I)ce and safety.    Rehab Potential is good    Activity tolerance:  Good    Plan:     Patient to be seen 5 x/week to address the above listed problems via self-care/home management, therapeutic exercises, therapeutic activities    Plan of Care Expires: 12/24/22  Plan of Care Reviewed with: patient    Subjective     Communicated with: Nurse prior to session.    Pain/Comfort:  Pain Rating 1: 3/10  Location - Orientation 1: posterior  Location 1: neck  Pain Addressed 1: Reposition, Distraction  Pain Rating Post-Intervention 1: 3/10    Patient's cultural, spiritual, Sabianist conflicts given the current situation:  no    Objective:     Patient found HOB  elevated with cervical collar upon OT entry to room.    Bed Mobility:    Patient completed Rolling/Turning to Right with supervision  Patient completed Scooting/Bridging with supervision  Patient completed Supine to Sit with supervision     Functional Mobility/Transfers:  Patient completed Sit <> Stand Transfer with supervision  with  rolling walker   Patient completed Bed <> Chair Transfer using Stand Pivot technique with stand by assistance with no assistive device  Functional Mobility: Pt ambulated from EOB to sink in bathroom for grooming and back to EOB. Pt propelled W/C from room to elevator then to security to drop off laundry to be picked up then back to elevator then to room with sup for approximately 200 plus ft using BUE to propel chair.    Activities of Daily Living:  Grooming: supervision standing sinkside with set up   Upper Body Dressing: minimum assistance to get shirt around cervical collar when doff/donning pullover shirt  Lower Body Dressing: stand by assistance sitting EOB with RW when standing to manage pants    AMPAC 6 Click ADL:        Treatment & Education:  Pt educated on role of OT, safety while performing functional transfers, safety while performing self care tasks, orthopedic precautions, importance to adhering to precautions and progress towards OT goals    Patient left up in chair with call button in reach    GOALS:   Multidisciplinary Problems       Occupational Therapy Goals          Problem: Occupational Therapy    Goal Priority Disciplines Outcome Interventions   Occupational Therapy Goal     OT, PT/OT Ongoing, Progressing    Description: Goals to be met by: 12/24/22     Patient will increase functional independence with ADLs by performing:    UE Dressing with Modified [place].  LE Dressing with Modified [place].  Grooming while standing at sink with Modified [place].  Toileting from toilet with Modified [place] for hygiene and clothing management.   Bathing  from  shower chair/bench with Supervision.  Step transfer with Modified [place]  Upper extremity exercise program with supervision.  Caregiver will be educated on level of assistance required to safely perform self care and functional transfers.                         Time Tracking:     OT Date of Treatment: 12/05/22  OT Start Time: 0848    OT Stop Time: 0926  OT Total Time (min): 38 min    Billable Minutes:Self Care/Home Management 23  Therapeutic Activity 15    12/5/2022

## 2022-12-05 NOTE — PT/OT/SLP PROGRESS
"Physical Therapy Treatment    Patient Name:  Manisha Wynne   MRN:  [id number]  Admit Date: 11/23/2022  Admitting Diagnosis: Cervical myelopathy  Recent Surgeries: LAMINECTOMY, SPINE, CERVICAL, WITH POSTERIOR FUSION C3-C6    General Precautions: Standard, fall  Orthopedic Precautions: spinal precautions  Braces: Aspen collar    Recommendations:     Discharge Recommendations: home health PT  Level of Assistance Recommended at Discharge: Intermittent assistance   Discharge Equipment Recommendations: bedside commode, bath bench, wheelchair, walker, rolling  Barriers to discharge: None    Assessment:     Manisha Wynne is a 72 y.o. female admitted with a medical diagnosis of Cervical myelopathy. Patient agreeable to PT treatment this AM. Patient able to ambulate on level/unlevel surfaces and complete curb/stair training with SBA. Transfers completed with Supervision. Patient demonstrates good tolerance for recumbent cross  at level 3 for 10 minutes without rest. Patient motivated to regain functional independence. Patient will benefit from continued SNF rehabilitation services to address deficits with progression toward PLOF.      Performance deficits affecting function: weakness, impaired endurance, impaired functional mobility, gait instability, impaired balance, decreased lower extremity function, decreased safety awareness, orthopedic precautions.    Rehab Potential is good    Activity Tolerance: Good    Plan:     Patient to be seen 6 x/week to address the above listed problems via gait training, therapeutic activities, therapeutic exercises, neuromuscular re-education, wheelchair management/training    Plan of Care Expires: 12/25/22  Plan of Care Reviewed with: patient    Subjective     "I don't really have pain. It's the itching that drives me."     Pain/Comfort:  Pain Rating 1: 0/10 (Patient reports itching at incision site.)  Pain Rating Post-Intervention 1: 0/10    Patient's cultural, " spiritual, Restoration conflicts given the current situation:  no    Objective:     Communicated with nursing and [facility] prior to session.  Patient found up in chair with cervical collar upon PT entry to room.     Therapeutic Activities and Exercises:   Recumbent Cross Trainer x 10 minutes, level 3, seat 7; completed without rest at a pace above 60 SPM    Standing wall squats with red theraball, 2 x 10 reps with seated rest between sets secondary to LE fatigue    Functional Mobility:  Transfers:     Sit to Stand:  supervision with rolling walker  Chair to/from NuStep: supervision with  rolling walker  using  Step Transfer  Gait: Patient ambulated 200 feet on level surface using RW with SBA. Patient ambulated 10 feet x 2 trials on unlevel mat using RW with SBA.   Stairs:  Pt ascended/descended 4 steps x 3 consecutive trials with No Assistive Device alternating R/L handrail (1 HR at a time) with Stand-by Assistance and reciprocal steps to ascend/non reciprocal steps to descend. Patient ascended/descended 4 inch curb step x 2 trials using RW with SBA.     AM-PAC 6 CLICK MOBILITY  22    Patient left up in chair with call button in reach and nursing staff notified.    GOALS:   Multidisciplinary Problems       Physical Therapy Goals          Problem: Physical Therapy    Goal Priority Disciplines Outcome Goal Variances Interventions   Physical Therapy Goal     PT, PT/OT Ongoing, Progressing     Description: Goals to be met by: 12/25/22    Patient will increase functional independence with mobility by performing:    . Supine to sit with Modified [place]  . Sit to supine with Modified [place]  . Rolling to Left and Right with Modified [place].  . Sit to stand transfer with Supervision - Met 12/5  Updated Goal: Sit to stand transfer with Mod I using RW    . Bed to chair transfer with Supervision using Rolling Walker/no AD - Met 12/5  Updated Goal: Bed to chair transfer with Mod I using RW    . Gait  x 150 feet with  Supervision using Rolling Walker.   . Wheelchair propulsion x150 feet with Modified [place] using bilateral uppper extremities  . Ascend/descend 12 stair with bilateral Handrails Supervision using No Assistive Device.   . Ascend/Descend 4 inch curb step with Supervision using Rolling Walker.                         Time Tracking:     PT Received On: 12/05/22  PT Start Time: 0954  PT Stop Time: 1035  PT Total Time (min): 41 min    Billable Minutes: Gait Training 23 and Therapeutic Exercise 18    Treatment Type: Treatment  PT/PTA: PT     PTA Visit Number: 0     12/05/2022

## 2022-12-06 PROCEDURE — 25000242 PHARM REV CODE 250 ALT 637 W/ HCPCS: Performed by: HOSPITALIST

## 2022-12-06 PROCEDURE — 97530 THERAPEUTIC ACTIVITIES: CPT

## 2022-12-06 PROCEDURE — 25000003 PHARM REV CODE 250: Performed by: HOSPITALIST

## 2022-12-06 PROCEDURE — 97116 GAIT TRAINING THERAPY: CPT

## 2022-12-06 PROCEDURE — 25000003 PHARM REV CODE 250: Performed by: NURSE PRACTITIONER

## 2022-12-06 PROCEDURE — 63600175 PHARM REV CODE 636 W HCPCS: Performed by: NURSE PRACTITIONER

## 2022-12-06 PROCEDURE — 97110 THERAPEUTIC EXERCISES: CPT

## 2022-12-06 PROCEDURE — 97535 SELF CARE MNGMENT TRAINING: CPT

## 2022-12-06 PROCEDURE — 11000004 HC SNF PRIVATE

## 2022-12-06 RX ADMIN — MENTHOL, METHYL SALICYLATE: 10; 15 CREAM TOPICAL at 09:12

## 2022-12-06 RX ADMIN — LOSARTAN POTASSIUM 25 MG: 25 TABLET, FILM COATED ORAL at 08:12

## 2022-12-06 RX ADMIN — BUPROPION HYDROCHLORIDE 300 MG: 300 TABLET, FILM COATED, EXTENDED RELEASE ORAL at 08:12

## 2022-12-06 RX ADMIN — ACETAMINOPHEN 1000 MG: 500 TABLET ORAL at 05:12

## 2022-12-06 RX ADMIN — GABAPENTIN 100 MG: 100 CAPSULE ORAL at 09:12

## 2022-12-06 RX ADMIN — GABAPENTIN 100 MG: 100 CAPSULE ORAL at 02:12

## 2022-12-06 RX ADMIN — OXYCODONE HYDROCHLORIDE 15 MG: 10 TABLET ORAL at 09:12

## 2022-12-06 RX ADMIN — Medication 6 MG: at 09:12

## 2022-12-06 RX ADMIN — ACETAMINOPHEN 1000 MG: 500 TABLET ORAL at 01:12

## 2022-12-06 RX ADMIN — CHOLECALCIFEROL TAB 125 MCG (5000 UNIT) 5000 UNITS: 125 TAB at 09:12

## 2022-12-06 RX ADMIN — ACETAMINOPHEN 1000 MG: 500 TABLET ORAL at 09:12

## 2022-12-06 RX ADMIN — Medication 1 TABLET: at 09:12

## 2022-12-06 RX ADMIN — METOPROLOL TARTRATE 25 MG: 25 TABLET, FILM COATED ORAL at 08:12

## 2022-12-06 RX ADMIN — FLUOXETINE 40 MG: 10 CAPSULE ORAL at 08:12

## 2022-12-06 RX ADMIN — Medication 70 MG: at 08:12

## 2022-12-06 RX ADMIN — METOPROLOL TARTRATE 25 MG: 25 TABLET, FILM COATED ORAL at 09:12

## 2022-12-06 RX ADMIN — GABAPENTIN 100 MG: 100 CAPSULE ORAL at 08:12

## 2022-12-06 RX ADMIN — PRAVASTATIN SODIUM 20 MG: 20 TABLET ORAL at 09:12

## 2022-12-06 RX ADMIN — ENOXAPARIN SODIUM 40 MG: 100 INJECTION SUBCUTANEOUS at 04:12

## 2022-12-06 NOTE — PT/OT/SLP PROGRESS
Occupational Therapy   Treatment    Name: Manisha Wynne  MRN: [id number]  Admit Date: 11/23/2022  Admitting Diagnosis:  Cervical myelopathy    General Precautions: Standard, fall   Orthopedic Precautions: spinal precautions   Braces: Aspen collar    Recommendations:     Discharge Recommendations:  home health OT  Level of Assistance Recommended at Discharge: Intermittent assistance for ADL's and homemaking tasks  Discharge Equipment Recommendations: bedside commode, bath bench, wheelchair, walker, rolling  Barriers to discharge:  None    Assessment:     Manisha Wynne is a 72 y.o. female with a medical diagnosis of Cervical myelopathy.  She presents with the following performance deficits affecting function are weakness, impaired endurance, impaired functional mobility, impaired self care skills, gait instability, impaired balance, decreased lower extremity function, decreased upper extremity function, decreased safety awareness, pain, orthopedic precautions, impaired cardiopulmonary response to activity.  Pt tolerated session well and without incident, but she continues to require assistance to perform self-care tasks and mobility.  She would continue to benefit from skilled OT services at SNF to maximize her gains in functional independence.      Rehab Potential is good    Activity tolerance:  Good    Plan:     Patient to be seen 5 x/week to address the above listed problems via self-care/home management, therapeutic activities, therapeutic exercises    Plan of Care Expires: 12/24/22  Plan of Care Reviewed with: patient    Subjective     Communicated with: Eddie prior to session.    Pain/Comfort:  Pain Rating 1: 5/10  Location - Side 1: Bilateral  Location - Orientation 1: posterior  Location 1: neck  Pain Addressed 1: Reposition, Distraction, Cessation of Activity  Pain Rating Post-Intervention 1: 7/10    Patient's cultural, spiritual, Episcopalian conflicts given the current  situation:  no    Objective:     Patient found up in chair with cervical collar upon OT entry to room.    Bed Mobility:    N/A due to pt up in chair upon OT arrival.     Functional Mobility/Transfers:  Patient completed Sit <> Stand Transfer from wheelchair with contact guard assistance with rolling walker   Patient completed Sit <> Stand Transfer from bedside recliner with initial min-mod assistance x1, progressing to contact guard assistance x2 with rolling walker   Pt required verbal cues for proper foot placement, as pt with tendency to place feet too far in front of her when attempting to stand  Patient completed Toilet Transfer Step Transfer technique with contact guard assistance with  rolling walker  Functional Mobility: Pt ambulated within room approx. 50' with close SBA and RW.     Activities of Daily Living:  Grooming: minimum assistance to don shampoo cap and (A) for thoroughness of scrubbing. Pt scrubbed cap with BUE in stance with SBA. (A) to comb back of hair due to pt concern of hitting incision site.   Toileting: stand by assistance to perform clothing mgmt in stance and seated pericares.   Pt demos good adherence to spinal precautions by turning body to face back of toilet to flush handle    AMPAC 6 Click ADL: 20    Treatment & Education:  Educated pt on management of c-collar, as pt had been attempting to tighten collar throughout the day by pulling on end of velcro, resulting in no change of fit of c-collar. Educated pt on additional velcro towards side/back of c-collar and that it would also need to be readjusted in order to change the fit. Pt required mod A to adjust while sitting in front of mirror.     Educated pt on placing R hand on RW and using L hand to push off of surface when standing to limit WB through R hand with wrist extended, which pt reports is painful due to previous injury.     Pt edu on role of OT, POC, safety when performing self care tasks , benefit of performing OOB  activity, orthopedic precautions and safety when performing functional transfers and mobility.  - Self care tasks completed-- as noted above      Patient left up in chair with call button in reach and Nsg notified and all immediate needs met.    GOALS:   Multidisciplinary Problems       Occupational Therapy Goals          Problem: Occupational Therapy    Goal Priority Disciplines Outcome Interventions   Occupational Therapy Goal     OT, PT/OT Ongoing, Progressing    Description: Goals to be met by: 12/24/22     Patient will increase functional independence with ADLs by performing:    UE Dressing with Modified [place].  LE Dressing with Modified [place].  Grooming while standing at sink with Modified [place].  Toileting from toilet with Modified [place] for hygiene and clothing management.   Bathing from  shower chair/bench with Supervision.  Step transfer with Modified [place]  Upper extremity exercise program with supervision.  Caregiver will be educated on level of assistance required to safely perform self care and functional transfers.                         Time Tracking:     OT Date of Treatment: 12/06/22  OT Start Time: 1409    OT Stop Time: 1451  OT Total Time (min): 42 min    Billable Minutes:Self Care/Home Management 25  Therapeutic Activity 17    12/6/2022

## 2022-12-06 NOTE — PLAN OF CARE
Plan of care reviewed with patient.  Patient verbalized understanding and had no further questions.  Patient has had no complaints throughout the shift, worked with PT/OT, and vital signs within normal limits.  Patient now resting comfortably in bed locked in lowest position, side rails up x3, and call bell in reach.  Will continue to monitor.       Problem: Adult Inpatient Plan of Care  Goal: Plan of Care Review  Outcome: Ongoing, Progressing     Problem: Adult Inpatient Plan of Care  Goal: Patient-Specific Goal (Individualized)  Outcome: Ongoing, Progressing

## 2022-12-07 PROCEDURE — 25000242 PHARM REV CODE 250 ALT 637 W/ HCPCS: Performed by: HOSPITALIST

## 2022-12-07 PROCEDURE — 63600175 PHARM REV CODE 636 W HCPCS: Performed by: NURSE PRACTITIONER

## 2022-12-07 PROCEDURE — 97116 GAIT TRAINING THERAPY: CPT | Mod: CQ

## 2022-12-07 PROCEDURE — 11000004 HC SNF PRIVATE

## 2022-12-07 PROCEDURE — 25000003 PHARM REV CODE 250: Performed by: HOSPITALIST

## 2022-12-07 PROCEDURE — 25000003 PHARM REV CODE 250: Performed by: NURSE PRACTITIONER

## 2022-12-07 PROCEDURE — 97535 SELF CARE MNGMENT TRAINING: CPT | Mod: CO

## 2022-12-07 PROCEDURE — 97530 THERAPEUTIC ACTIVITIES: CPT | Mod: CQ

## 2022-12-07 RX ADMIN — ACETAMINOPHEN 1000 MG: 500 TABLET ORAL at 06:12

## 2022-12-07 RX ADMIN — FLUOXETINE 40 MG: 10 CAPSULE ORAL at 08:12

## 2022-12-07 RX ADMIN — LOSARTAN POTASSIUM 25 MG: 25 TABLET, FILM COATED ORAL at 08:12

## 2022-12-07 RX ADMIN — METOPROLOL TARTRATE 25 MG: 25 TABLET, FILM COATED ORAL at 08:12

## 2022-12-07 RX ADMIN — CHOLECALCIFEROL TAB 125 MCG (5000 UNIT) 5000 UNITS: 125 TAB at 10:12

## 2022-12-07 RX ADMIN — Medication 1 TABLET: at 10:12

## 2022-12-07 RX ADMIN — OXYCODONE HYDROCHLORIDE 15 MG: 10 TABLET ORAL at 10:12

## 2022-12-07 RX ADMIN — ENOXAPARIN SODIUM 40 MG: 100 INJECTION SUBCUTANEOUS at 04:12

## 2022-12-07 RX ADMIN — BUPROPION HYDROCHLORIDE 300 MG: 300 TABLET, FILM COATED, EXTENDED RELEASE ORAL at 08:12

## 2022-12-07 RX ADMIN — ACETAMINOPHEN 1000 MG: 500 TABLET ORAL at 10:12

## 2022-12-07 RX ADMIN — GABAPENTIN 100 MG: 100 CAPSULE ORAL at 08:12

## 2022-12-07 RX ADMIN — PRAVASTATIN SODIUM 20 MG: 20 TABLET ORAL at 10:12

## 2022-12-07 RX ADMIN — Medication 6 MG: at 10:12

## 2022-12-07 RX ADMIN — GABAPENTIN 100 MG: 100 CAPSULE ORAL at 02:12

## 2022-12-07 RX ADMIN — METOPROLOL TARTRATE 25 MG: 25 TABLET, FILM COATED ORAL at 10:12

## 2022-12-07 RX ADMIN — ACETAMINOPHEN 1000 MG: 500 TABLET ORAL at 02:12

## 2022-12-07 RX ADMIN — MENTHOL, METHYL SALICYLATE: 10; 15 CREAM TOPICAL at 09:12

## 2022-12-07 RX ADMIN — GABAPENTIN 100 MG: 100 CAPSULE ORAL at 10:12

## 2022-12-07 RX ADMIN — Medication 70 MG: at 08:12

## 2022-12-07 RX ADMIN — SENNOSIDES AND DOCUSATE SODIUM 1 TABLET: 50; 8.6 TABLET ORAL at 04:12

## 2022-12-07 NOTE — PT/OT/SLP PROGRESS
Physical Therapy Treatment    Patient Name:  Mnaisha Wynne   MRN:  [id number]  Admit Date: 11/23/2022  Admitting Diagnosis: Cervical myelopathy    General Precautions: Standard, fall  Orthopedic Precautions: spinal precautions  Braces: Aspen collar    Recommendations:     Discharge Recommendations: home health PT  Level of Assistance Recommended at Discharge: {SNF ASSISTANCE LEVEL OHS:556165961}  Discharge Equipment Recommendations: bedside commode, bath bench, wheelchair, walker, rolling  Barriers to discharge: None    Assessment:     Manisha Wynne is a 72 y.o. female admitted with a medical diagnosis of Cervical myelopathy . ***.      Performance deficits affecting function: weakness, impaired endurance, impaired functional mobility, gait instability, impaired balance, decreased lower extremity function, decreased safety awareness, pain, orthopedic precautions.    Rehab Potential is {Rehab potential:00244}    Activity Tolerance: {THERAPY ACTIVITY TOLERANCE OHS:228781511}    Plan:     Patient to be seen 6 x/week to address the above listed problems via gait training, therapeutic activities, therapeutic exercises, neuromuscular re-education, wheelchair management/training    Plan of Care Expires: 12/25/22  Plan of Care Reviewed with: patient    Subjective     ***.     Pain/Comfort:  Pain Rating 1: 0/10  Pain Rating Post-Intervention 1: 0/10    Patient's cultural, spiritual, Protestant conflicts given the current situation:  no    Objective:     Communicated with *** prior to session.  Patient found {IP OT PATIENT LEFT POSITION OHS:545152345} with cervical collar upon PT entry to room.     Therapeutic Activities and Exercises: ***    Functional Mobility:  {IP PT ACUTE FUNCTIONAL MOBILITY OHS:75113}    AM-PAC 6 CLICK MOBILITY  22    Patient left {IP OT PATIENT LEFT POSITION OHS:996366465} with {Patient left with:22572}.    GOALS:   Multidisciplinary Problems       Physical Therapy Goals           Problem: Physical Therapy    Goal Priority Disciplines Outcome Goal Variances Interventions   Physical Therapy Goal     PT, PT/OT Ongoing, Progressing     Description: Goals to be met by: 12/25/22    Patient will increase functional independence with mobility by performing:    . Supine to sit with Modified [place]  . Sit to supine with Modified [place]  . Rolling to Left and Right with Modified [place].  . Sit to stand transfer with Supervision - Met 12/5  Updated Goal: Sit to stand transfer with Mod I using RW    . Bed to chair transfer with Supervision using Rolling Walker/no AD - Met 12/5  Updated Goal: Bed to chair transfer with Mod I using RW    . Gait  x 150 feet with Supervision using Rolling Walker.   . Wheelchair propulsion x150 feet with Modified [place] using bilateral uppper extremities  . Ascend/descend 12 stair with bilateral Handrails Supervision using No Assistive Device.   . Ascend/Descend 4 inch curb step with Supervision using Rolling Walker.                         Time Tracking:     PT Received On: 12/07/22  PT Start Time: 0759  PT Stop Time: 0841  PT Total Time (min): 42 min    Billable Minutes: {IP PT TREATMENT BILLABLE MINUTES OHS:5013644638}    Treatment Type: Treatment  PT/PTA: PTA     PTA Visit Number: 1     12/07/2022

## 2022-12-07 NOTE — PLAN OF CARE
Plan of care reviewed with patient.  Patient verbalized understanding and had no further questions.  Patient worked with PT/OT this shift and has complaints of mild neck pain.  Patient now resting comfortably in bed locked in lowest position, side rails up x3, and call bell in reach.  Will continue to monitor.       Problem: Adult Inpatient Plan of Care  Goal: Plan of Care Review  Outcome: Ongoing, Progressing     Problem: Adult Inpatient Plan of Care  Goal: Plan of Care Review  Outcome: Ongoing, Progressing     Problem: Adult Inpatient Plan of Care  Goal: Patient-Specific Goal (Individualized)  Outcome: Ongoing, Progressing

## 2022-12-07 NOTE — PT/OT/SLP PROGRESS
Occupational Therapy   Treatment    Name: Manisha Wynne  MRN: [id number]  Admit Date: 11/23/2022  Admitting Diagnosis:  Cervical myelopathy    General Precautions: Standard, fall   Orthopedic Precautions: spinal precautions   Braces: Aspen collar    Recommendations:     Discharge Recommendations:  home health OT  Level of Assistance Recommended at Discharge: Intermittent assistance for ADL's and homemaking tasks  Discharge Equipment Recommendations: bedside commode, bath bench, wheelchair, walker, rolling  Barriers to discharge:  None    Assessment:     Manisha Wynne is a 72 y.o. female with a medical diagnosis of Cervical myelopathy.  She presents with limitations in performance of self-care, functional mobility, and ADLs. Performance deficits affecting function are weakness, impaired endurance, impaired functional mobility, impaired self care skills, gait instability, impaired balance, decreased lower extremity function, decreased upper extremity function, decreased safety awareness, pain, orthopedic precautions, impaired cardiopulmonary response to activity.   Pt tolerated Tx without incident and is making progress but continues to require assist to perform self care tasks, functional mobility and functional transfers . She would continue to benefit from OT intervention to further her functional (I)ce and safety.      Rehab Potential is good    Activity tolerance:  Good    Plan:     Patient to be seen 5 x/week to address the above listed problems via self-care/home management, therapeutic activities, therapeutic exercises    Plan of Care Expires: 12/24/22  Plan of Care Reviewed with: patient    Subjective     Communicated with: Nurse prior to session.    Pain/Comfort:  Pain Rating 1: 4/10  Location - Side 1: Bilateral  Location - Orientation 1: posterior  Location 1: neck  Pain Addressed 1: Reposition, Distraction  Pain Rating Post-Intervention 1: 4/10    Patient's cultural, spiritual, Adventist  conflicts given the current situation:  no    Objective:     Patient found  up in bedside chair  with cervical collar upon OT entry to room.    Bed Mobility:    Pt seated in bedside chair at onset of treatment session.     Functional Mobility/Transfers:  Patient completed Sit <> Stand Transfer with stand by assistance  with  rolling walker   Patient completed Shower Chair Transfer Step Transfer technique from bedside chair and back with supervision with rolling walker  Functional Mobility: Pt ambulated in room from bedside chair to shower chair in bathroom and back to bedside chair once completed bathing/grooming.     Activities of Daily Living:  Grooming: modified independence standing sinkside with set up with RW  Bathing: stand by assistance sitting on shower chair to complete sponge bath with RW for standing to complete slim area  Upper Body Dressing: stand by assistance with v/c to orient bra secondary to strap being twisted  Lower Body Dressing: stand by assistance seated with RW to stand to manage pants    AMPAC 6 Click ADL: 20    Treatment & Education:  Pt educated on role of OT, safety while performing functional transfers, safety while performing self care tasks, orthopedic precautions, importance to adhering to precautions and progress towards OT goals    Patient left  up in bedside chair  with call button in reach    GOALS:   Multidisciplinary Problems       Occupational Therapy Goals          Problem: Occupational Therapy    Goal Priority Disciplines Outcome Interventions   Occupational Therapy Goal     OT, PT/OT Ongoing, Progressing    Description: Goals to be met by: 12/24/22     Patient will increase functional independence with ADLs by performing:    UE Dressing with Modified [place].  LE Dressing with Modified [place].  Grooming while standing at sink with Modified [place].  Toileting from toilet with Modified [place] for hygiene and clothing management.   Bathing from  shower  chair/bench with Supervision.  Step transfer with Modified [place]  Upper extremity exercise program with supervision.  Caregiver will be educated on level of assistance required to safely perform self care and functional transfers.                         Time Tracking:     OT Date of Treatment: 12/07/22  OT Start Time: 1024    OT Stop Time: 1106  OT Total Time (min): 42 min    Billable Minutes:Self Care/Home Management 42    12/7/2022

## 2022-12-08 LAB
ANION GAP SERPL CALC-SCNC: 6 MMOL/L (ref 8–16)
BASOPHILS # BLD AUTO: 0.04 K/UL (ref 0–0.2)
BASOPHILS NFR BLD: 0.8 % (ref 0–1.9)
BUN SERPL-MCNC: 17 MG/DL (ref 8–23)
CALCIUM SERPL-MCNC: 9.1 MG/DL (ref 8.7–10.5)
CHLORIDE SERPL-SCNC: 106 MMOL/L (ref 95–110)
CO2 SERPL-SCNC: 27 MMOL/L (ref 23–29)
CREAT SERPL-MCNC: 0.9 MG/DL (ref 0.5–1.4)
DIFFERENTIAL METHOD: ABNORMAL
EOSINOPHIL # BLD AUTO: 0.2 K/UL (ref 0–0.5)
EOSINOPHIL NFR BLD: 4.5 % (ref 0–8)
ERYTHROCYTE [DISTWIDTH] IN BLOOD BY AUTOMATED COUNT: 13.5 % (ref 11.5–14.5)
EST. GFR  (NO RACE VARIABLE): >60 ML/MIN/1.73 M^2
GLUCOSE SERPL-MCNC: 79 MG/DL (ref 70–110)
HCT VFR BLD AUTO: 29.2 % (ref 37–48.5)
HGB BLD-MCNC: 9.1 G/DL (ref 12–16)
IMM GRANULOCYTES # BLD AUTO: 0.02 K/UL (ref 0–0.04)
IMM GRANULOCYTES NFR BLD AUTO: 0.4 % (ref 0–0.5)
LYMPHOCYTES # BLD AUTO: 1.8 K/UL (ref 1–4.8)
LYMPHOCYTES NFR BLD: 34.5 % (ref 18–48)
MAGNESIUM SERPL-MCNC: 2 MG/DL (ref 1.6–2.6)
MCH RBC QN AUTO: 29.8 PG (ref 27–31)
MCHC RBC AUTO-ENTMCNC: 31.2 G/DL (ref 32–36)
MCV RBC AUTO: 96 FL (ref 82–98)
MONOCYTES # BLD AUTO: 0.9 K/UL (ref 0.3–1)
MONOCYTES NFR BLD: 16.3 % (ref 4–15)
NEUTROPHILS # BLD AUTO: 2.3 K/UL (ref 1.8–7.7)
NEUTROPHILS NFR BLD: 43.5 % (ref 38–73)
NRBC BLD-RTO: 0 /100 WBC
PHOSPHATE SERPL-MCNC: 4.6 MG/DL (ref 2.7–4.5)
PLATELET # BLD AUTO: 183 K/UL (ref 150–450)
PMV BLD AUTO: 11 FL (ref 9.2–12.9)
POTASSIUM SERPL-SCNC: 4.5 MMOL/L (ref 3.5–5.1)
RBC # BLD AUTO: 3.05 M/UL (ref 4–5.4)
SODIUM SERPL-SCNC: 139 MMOL/L (ref 136–145)
WBC # BLD AUTO: 5.33 K/UL (ref 3.9–12.7)

## 2022-12-08 PROCEDURE — 97535 SELF CARE MNGMENT TRAINING: CPT | Mod: CO

## 2022-12-08 PROCEDURE — 85025 COMPLETE CBC W/AUTO DIFF WBC: CPT | Performed by: HOSPITALIST

## 2022-12-08 PROCEDURE — 97530 THERAPEUTIC ACTIVITIES: CPT | Mod: CO

## 2022-12-08 PROCEDURE — 11000004 HC SNF PRIVATE

## 2022-12-08 PROCEDURE — U0005 INFEC AGEN DETEC AMPLI PROBE: HCPCS | Performed by: HOSPITALIST

## 2022-12-08 PROCEDURE — 97110 THERAPEUTIC EXERCISES: CPT | Mod: CQ

## 2022-12-08 PROCEDURE — 25000003 PHARM REV CODE 250: Performed by: HOSPITALIST

## 2022-12-08 PROCEDURE — U0003 INFECTIOUS AGENT DETECTION BY NUCLEIC ACID (DNA OR RNA); SEVERE ACUTE RESPIRATORY SYNDROME CORONAVIRUS 2 (SARS-COV-2) (CORONAVIRUS DISEASE [COVID-19]), AMPLIFIED PROBE TECHNIQUE, MAKING USE OF HIGH THROUGHPUT TECHNOLOGIES AS DESCRIBED BY CMS-2020-01-R: HCPCS | Performed by: HOSPITALIST

## 2022-12-08 PROCEDURE — 80048 BASIC METABOLIC PNL TOTAL CA: CPT | Performed by: HOSPITALIST

## 2022-12-08 PROCEDURE — 97116 GAIT TRAINING THERAPY: CPT | Mod: CQ

## 2022-12-08 PROCEDURE — 63600175 PHARM REV CODE 636 W HCPCS: Performed by: NURSE PRACTITIONER

## 2022-12-08 PROCEDURE — 97530 THERAPEUTIC ACTIVITIES: CPT | Mod: CQ

## 2022-12-08 PROCEDURE — 25000242 PHARM REV CODE 250 ALT 637 W/ HCPCS: Performed by: HOSPITALIST

## 2022-12-08 PROCEDURE — 83735 ASSAY OF MAGNESIUM: CPT | Performed by: HOSPITALIST

## 2022-12-08 PROCEDURE — 84100 ASSAY OF PHOSPHORUS: CPT | Performed by: HOSPITALIST

## 2022-12-08 PROCEDURE — 36415 COLL VENOUS BLD VENIPUNCTURE: CPT | Performed by: HOSPITALIST

## 2022-12-08 PROCEDURE — 25000003 PHARM REV CODE 250: Performed by: NURSE PRACTITIONER

## 2022-12-08 RX ADMIN — GABAPENTIN 100 MG: 100 CAPSULE ORAL at 02:12

## 2022-12-08 RX ADMIN — ACETAMINOPHEN 1000 MG: 500 TABLET ORAL at 09:12

## 2022-12-08 RX ADMIN — FLUOXETINE 40 MG: 10 CAPSULE ORAL at 08:12

## 2022-12-08 RX ADMIN — METOPROLOL TARTRATE 25 MG: 25 TABLET, FILM COATED ORAL at 08:12

## 2022-12-08 RX ADMIN — Medication 6 MG: at 09:12

## 2022-12-08 RX ADMIN — GABAPENTIN 100 MG: 100 CAPSULE ORAL at 08:12

## 2022-12-08 RX ADMIN — ACETAMINOPHEN 1000 MG: 500 TABLET ORAL at 02:12

## 2022-12-08 RX ADMIN — OXYCODONE HYDROCHLORIDE 15 MG: 10 TABLET ORAL at 09:12

## 2022-12-08 RX ADMIN — Medication 70 MG: at 07:12

## 2022-12-08 RX ADMIN — PRAVASTATIN SODIUM 20 MG: 20 TABLET ORAL at 09:12

## 2022-12-08 RX ADMIN — ENOXAPARIN SODIUM 40 MG: 100 INJECTION SUBCUTANEOUS at 05:12

## 2022-12-08 RX ADMIN — Medication 1 TABLET: at 09:12

## 2022-12-08 RX ADMIN — MENTHOL, METHYL SALICYLATE: 10; 15 CREAM TOPICAL at 09:12

## 2022-12-08 RX ADMIN — CHOLECALCIFEROL TAB 125 MCG (5000 UNIT) 5000 UNITS: 125 TAB at 09:12

## 2022-12-08 RX ADMIN — LOSARTAN POTASSIUM 25 MG: 25 TABLET, FILM COATED ORAL at 08:12

## 2022-12-08 RX ADMIN — GABAPENTIN 100 MG: 100 CAPSULE ORAL at 09:12

## 2022-12-08 RX ADMIN — BUPROPION HYDROCHLORIDE 300 MG: 300 TABLET, FILM COATED, EXTENDED RELEASE ORAL at 08:12

## 2022-12-08 RX ADMIN — ACETAMINOPHEN 1000 MG: 500 TABLET ORAL at 06:12

## 2022-12-08 RX ADMIN — METOPROLOL TARTRATE 25 MG: 25 TABLET, FILM COATED ORAL at 09:12

## 2022-12-08 NOTE — PT/OT/SLP PROGRESS
"Physical Therapy Treatment    Patient Name:  Manisha Wynne   MRN:  [id number]  Admit Date: 11/23/2022  Admitting Diagnosis: Cervical myelopathy  Recent Surgeries:     General Precautions: Standard, fall  Orthopedic Precautions: spinal precautions  Braces: Aspen collar    Recommendations:     Discharge Recommendations: home health PT  Level of Assistance Recommended at Discharge: Intermittent assistance   Discharge Equipment Recommendations: bedside commode, bath bench, wheelchair, walker, rolling  Barriers to discharge: None    Assessment:     Manisha Wynne is a 72 y.o. female admitted with a medical diagnosis of Cervical myelopathy . Pt tolerated well, pt would continue to benefit from skilled PT services to improve overall functional mobility, strength and endurance.  .      Performance deficits affecting function: weakness, impaired endurance, impaired functional mobility, gait instability, impaired balance, decreased lower extremity function, decreased safety awareness, pain, orthopedic precautions.    Rehab Potential is good    Activity Tolerance: Fair    Plan:     Patient to be seen 6 x/week to address the above listed problems via gait training, therapeutic activities, therapeutic exercises, neuromuscular re-education, wheelchair management/training    Plan of Care Expires: 12/25/22  Plan of Care Reviewed with: patient    Subjective     "My neck is fine, its my lower back".     Pain/Comfort:  Pain Rating 1: 9/10  Location - Orientation 1: lower  Location 1: back  Pain Addressed 1: Reposition, Distraction, Cessation of Activity, Nurse notified (tylenol requested)  Pain Rating Post-Intervention 1:  (comes and goes, little better after walking)    Patient's cultural, spiritual, Moravian conflicts given the current situation:  no    Objective:       Patient found with  (in BSC cervical collar) upon PT entry to room.     Therapeutic Activities and Exercises: mini elliptical x 15 minutes medium " "resistance (nustep in use)  Dyn standing bal act cath/toss small orange ball CGA, catch/bounce small physio ball CGA x 10 reps each      Functional Mobility:  Transfers:     Sit to Stand:  supervision and stand by assistance with rolling walker  Bed to Chair: supervision with  rolling walker  using  Stand Pivot  Gait: amb with RW SBA ~ 343 ft to the gym and 150 ft  back to the room   Stairs:  asc/robin 4 steps with 1-2 rail SBA x 3 trials (12 steps)  Curb asc/robin 4" curb with RW SBA    AM-PAC 6 CLICK MOBILITY  20    Patient left up in chair with call button in reach and belonging sin reach .    GOALS:   Multidisciplinary Problems       Physical Therapy Goals          Problem: Physical Therapy    Goal Priority Disciplines Outcome Goal Variances Interventions   Physical Therapy Goal     PT, PT/OT Ongoing, Progressing     Description: Goals to be met by: 12/25/22    Patient will increase functional independence with mobility by performing:    . Supine to sit with Modified [place]  . Sit to supine with Modified [place]  . Rolling to Left and Right with Modified [place].  . Sit to stand transfer with Supervision - Met 12/5  Updated Goal: Sit to stand transfer with Mod I using RW    . Bed to chair transfer with Supervision using Rolling Walker/no AD - Met 12/5  Updated Goal: Bed to chair transfer with Mod I using RW    . Gait  x 150 feet with Supervision using Rolling Walker.   . Wheelchair propulsion x150 feet with Modified [place] using bilateral uppper extremities  . Ascend/descend 12 stair with bilateral Handrails Supervision using No Assistive Device.   . Ascend/Descend 4 inch curb step with Supervision using Rolling Walker.                         Time Tracking:     PT Received On: 12/08/22  PT Start Time: 1357  PT Stop Time: 1442  PT Total Time (min): 45 min    Billable Minutes: Gait Training 15, Therapeutic Activity 15, and Therapeutic Exercise 15    Treatment Type: Treatment  PT/PTA: PTA     PTA " Visit Number: 2     12/08/2022

## 2022-12-08 NOTE — PLAN OF CARE
Patient remained free of injury, trauma, or falls during  shift.   Patient is alert  and oriented x 4. Patient ambulated to bathroom with walker and was  able  to dress self in pajamas with min assist. Gait standby assist. Denies other needs at this time  Care plan reviewed with patient, questions encouraged.   Vitals remained stable throughout shift, will continue to monitor     Problem: Adult Inpatient Plan of Care  Goal: Plan of Care Review  Outcome: Ongoing, Progressing  Flowsheets (Taken 12/8/2022 0059)  Plan of Care Reviewed With: patient  Goal: Patient-Specific Goal (Individualized)  Outcome: Ongoing, Progressing  Goal: Absence of Hospital-Acquired Illness or Injury  Outcome: Ongoing, Progressing  Intervention: Identify and Manage Fall Risk  Flowsheets (Taken 12/8/2022 0059)  Safety Promotion/Fall Prevention:   assistive device/personal item within reach   diversional activities provided   Fall Risk reviewed with patient/family   Fall Risk signage in place   lighting adjusted   nonskid shoes/socks when out of bed   side rails raised x 2   /camera at bedside   instructed to call staff for mobility  Goal: Optimal Comfort and Wellbeing  Outcome: Ongoing, Progressing  Intervention: Provide Person-Centered Care  Flowsheets (Taken 12/8/2022 0059)  Trust Relationship/Rapport:   care explained   questions encouraged   choices provided   reassurance provided   emotional support provided   thoughts/feelings acknowledged   empathic listening provided   questions answered  Goal: Readiness for Transition of Care  Outcome: Ongoing, Progressing     Problem: Skin Injury Risk Increased  Goal: Skin Health and Integrity  Outcome: Ongoing, Progressing     Problem: Fall Injury Risk  Goal: Absence of Fall and Fall-Related Injury  Outcome: Ongoing, Progressing

## 2022-12-08 NOTE — PLAN OF CARE
Family Training     Patient Name:  Manisha Wynne   MRN:  [id number]  Admit Date: 11/23/2022      Rehab tech called to schedule family training this date. Pt's family/caregiver agreeable to attend training Monday, 12/12/2022 at 9:30am with patient's friend, Lacy.     12/8/2022

## 2022-12-08 NOTE — PROGRESS NOTES
[facility]                                  Skilled Nursing Facility                   Progress Note     Patient Name: Manisha Wynne  YOB: 1950  MRN: [id number]  Room: [place] [address] [address] A     Admit Date: 11/23/2022   CARO: 12/14/2022     Principal Problem: Cervical myelopathy    HPI obtained from patient interview and chart review     Chief Complaint:    Re-evaluation of medical treatment and therapy status: Lab review        HPI:   Manisha Wynne is a 72 y.o. female with PMH of cervical stenosis, myelopathy, and hypertension. She has an MRI-non-compatible pacemaker in place. s/p C3-C6 posterior cervical fusion on 11/14 by Dr. Cheek.     Patient will be treated at [facility] with PT and OT to improve functional status and ability to perform ADLs.     Interval History  All of today's labs reviewed and are listed below. No critical values noted. Discussed results with patient. 24 hr vital sign ranges listed below. Pain regimen tolerated. Patient denies shortness of breath, abdominal discomfort, nausea, or vomiting.  Patient reports an adequate appetite.  Patient denies dysuria.  Patient reports having regular bowel movements.  Patient progessing with PT/OT: .Gait: Patient ambulated 200 feet on level surface using RW with SBA. Patient ambulated 10 feet x 2 trials on unlevel mat using RW with SBA.  Continuing to follow and treat all acute and chronic conditions.    Past Medical History: Patient has a past medical history of Abnormal Pap smear, Atrial fibrillation, AV block, 1st degree (07/25/2012), C. difficile diarrhea, Cataract, Depression (07/24/2012), Facet arthritis of lumbar region (03/31/2015), Falls, Hyperlipidemia, Hypertension (07/24/2012), Neuropathy, Other specified cardiac dysrhythmias(427.89), Sleep apnea, Syncope (07/24/2012), and Tremors of nervous system.    Past Surgical History: Patient has a past  surgical history that includes Tonsillectomy; Dilation and curettage of uterus; Cardiac pacemaker placement (09/07/2012); Endoscopic ultrasound of upper gastrointestinal tract (N/A, 7/5/2019); [place] tooth extraction; Nasal septoplasty (N/A, 12/19/2019); Application of cartilage graft (Bilateral, 12/19/2019); Surgical removal of nasal turbinate (Bilateral, 12/19/2019); Open reduction and internal fixation (ORIF) of fracture of distal radius (Left, 1/24/2020); Myelography (N/A, 5/4/2021); Cataract extraction w/  intraocular lens implant (Right, 5/16/2022); Cataract extraction w/  intraocular lens implant (Left, 6/20/2022); Replacement of pacemaker generator (Left, 10/7/2022); and Posterior fusion of cervical spine with laminectomy (N/A, 11/14/2022).    Social History: Patient reports that she quit smoking about 40 years ago. Her smoking use included cigarettes. She has a 3.75 pack-year smoking history. She has quit using smokeless tobacco. She reports current alcohol use. She reports that she does not use drugs.    Family History:  family history is not on file. She was adopted.    Allergies: Patient has No Known Allergies.        Review of Systems   Constitutional:  Positive for malaise/fatigue. Negative for chills and fever.   HENT:  Negative for congestion, hearing loss and sore throat.    Eyes:  Negative for blurred vision and double vision.   Respiratory:  Negative for cough, sputum production, shortness of breath and wheezing.    Cardiovascular:  Negative for chest pain, palpitations and leg swelling.   Gastrointestinal:  Negative for abdominal pain, constipation, diarrhea, heartburn, nausea and vomiting.   Genitourinary:  Negative for dysuria and urgency.   Musculoskeletal:  Negative for back pain.   Skin:  Negative for rash.        + wound   Neurological:  Positive for weakness. Negative for dizziness and headaches.   Endo/Heme/Allergies:  Negative for polydipsia. Does not bruise/bleed easily.    Psychiatric/Behavioral:  Negative for depression and hallucinations. The patient is not nervous/anxious.        24 hour Vital Sign Range   Temp:  [98 °F (36.7 °C)-98.4 °F (36.9 °C)]   Pulse:  [60-69]   Resp:  [15-18]   BP: (113-117)/(55-56)   SpO2:  [98 %]       Physical Exam  Vitals and nursing note reviewed.   Constitutional:       General: She is not in acute distress.     Appearance: Normal appearance. She is not ill-appearing.   HENT:      Head: Normocephalic and atraumatic.      Nose: Nose normal. No congestion.      Mouth/Throat:      Mouth: Mucous membranes are moist.      Pharynx: Oropharynx is clear.   Eyes:      Extraocular Movements: Extraocular movements intact.      Conjunctiva/sclera: Conjunctivae normal.      Pupils: Pupils are equal, round, and reactive to light.   Neck:      Comments: C-collar in place    Cardiovascular:      Rate and Rhythm: Normal rate and regular rhythm.      Pulses: Normal pulses.      Heart sounds: Normal heart sounds. No murmur heard.  Pulmonary:      Effort: Pulmonary effort is normal. No respiratory distress.      Breath sounds: Normal breath sounds. No wheezing or rhonchi.   Abdominal:      General: Bowel sounds are normal. There is no distension.      Palpations: Abdomen is soft. There is no mass.      Tenderness: There is no abdominal tenderness.   Musculoskeletal:         General: No swelling or tenderness.      Cervical back: Normal range of motion and neck supple. No tenderness.      Right lower leg: No edema.      Left lower leg: No edema.   Skin:     General: Skin is warm and dry.      Capillary Refill: Capillary refill takes less than 2 seconds.      Findings: No erythema or rash.   Neurological:      Mental Status: She is alert and oriented to person, place, and time. Mental status is at baseline.      Motor: Weakness present.   Psychiatric:         Mood and Affect: Mood normal.         Behavior: Behavior normal.         Thought Content: Thought content normal.          Judgment: Judgment normal.     Full skin assessment completed by this NP           Incision/Site 06/25/15 1053 Left back (Active)   06/25/15 1053   Present Prior to Hospital Arrival?:    Side: Left   Location: back   Orientation:    Incision Type:    Closure Method:    Additional Comments:    Removal Indication and Assessment:    Wound Outcome:    Removal Indications:    Number of days: 2714            Incision/Site 11/14/22 1813 Neck (Active)   11/14/22 1813   Present Prior to Hospital Arrival?:    Side:    Location: Neck   Orientation:    Incision Type:    Closure Method:    Additional Comments:    Removal Indication and Assessment:    Wound Outcome:    Removal Indications:    Incision WDL ex 11/28/22 0950   Dressing Appearance Open to air 11/28/22 2126   Drainage Amount None 11/28/22 2126   Drainage Characteristics/Odor No odor 11/28/22 2126   Appearance Intact;Steri-strips intact 11/28/22 2126   Periwound Area Intact;Dry;Pink;Edematous 11/28/22 2126   Wound Edges Approximated 11/28/22 2126   Dressing Other (comment) 11/26/22 1001   Number of days: 15          Labs:  Recent Labs   Lab 12/05/22  0634 12/08/22  0405   WBC 4.89 5.33   HGB 9.1* 9.1*   HCT 29.8* 29.2*    183       Recent Labs   Lab 12/05/22  0634 12/08/22  0405    139   K 4.6 4.5    106   CO2 25 27   BUN 16 17   CREATININE 0.8 0.9   GLU 73 79   CALCIUM 9.5 9.1   MG 2.0 2.0   PHOS 4.4 4.6*       No results for input(s): ALKPHOS, ALT, AST, ALBUMIN, PROT, BILITOT, INR in the last 168 hours.  No results for input(s): POCTGLUCOSE in the last 72 hours.    Meds Scheduled:   acetaminophen  1,000 mg Oral Q8H    buPROPion  300 mg Oral Daily    calcium-vitamin D3  1 tablet Oral QHS    cholecalciferol (vitamin D3)  5,000 Units Oral QHS    enoxaparin  40 mg Subcutaneous Daily    FLUoxetine  40 mg Oral Daily    gabapentin  100 mg Oral TID    lisdexamfetamine  70 mg Oral QAM    losartan  25 mg Oral Daily    methyl salicylate-menthol 15-10%    Topical (Top) QHS    metoprolol tartrate  25 mg Oral BID    pravastatin  20 mg Oral QHS       PRN:   acetaminophen, calcium carbonate, dicyclomine, melatonin, oxyCODONE, senna-docusate 8.6-50 mg      Assessment and Plan:    Cervical myelopathy  cervical myelopathy s/p C3-C6 posterior cervical fusion on 11/14 by Dr. Cheek    - HELEN collar to be worn when upright and OOB  - Pain control- Initiate order to increase oxycodone to 15 q 4 hours prn severe pain. Increased gabapentin to 200 mg tid. Continue scheduled robaxin and tylenol.   - Bowel regimen: senna BID and miralax daily. 11/28 - -Atelectasis prevention: IS hourly while awake, PT/OT, OOB > 6 hours per day  -DVT PPx- Lovenox 40mg daily     Hypertension   continue home meds. Metoprolol 25mg, Losartan 25mg    Anticipate disposition:    Home with home health      Follow-up needed during SNF admission:       Follow-up needed after discharge from SNF:   - PCP within 1-2 weeks  - See appt scheduled below     Future Appointments   Date Time Provider Department Center   12/22/2022 10:00 AM [facility] OIC-XRAY [facility]   12/22/2022 11:00 AM Nicolette Cheek MD [facility] NEUROS8 [facility]   1/5/2023 10:00 AM HOME MONITOR DEVICE CHECK, [facility] ARRRO [facility]   1/10/2023  8:45 AM EKG, APPT [facility] EKG [facility]   1/10/2023  9:00 AM COORDINATED DEVICE CHECK [facility] ARRHPSILVERIO [facility]   1/10/2023  9:30 AM Svetlana Miner NP [facility] ARRHYTH [facility]   2/2/2023 11:30 AM Sunitha Rosario PA-C [facility] NEURO8 [facility]   2/10/2023 10:00 AM LAB, APPOINTMENT [facility] INTMED [facility] LAB IM [facility]   2/23/2023  9:15 AM INJECTION [facility] AMB INF [facility]         I certify that SNF services are required to be given on an inpatient basis because Manisha Wynne needs for skilled nursing care and/or skilled rehabilitation are required on a daily basis and such services can only practically be provided in a skilled nursing facility setting and are for an ongoing condition for which she  received inpatient care in the hospital.            Angely De La Rosa NP  Department of Hospital Medicine   [facility]     DOS: 12/8/2022       Patient note was created using MModal Dictation.  Any errors in syntax or even information may not have been identified and edited on initial review prior to signing this note.

## 2022-12-08 NOTE — PT/OT/SLP PROGRESS
Occupational Therapy   Treatment    Name: Manisha Wynne  MRN: [id number]  Admit Date: 11/23/2022  Admitting Diagnosis:  Cervical myelopathy    General Precautions: Standard, fall   Orthopedic Precautions: spinal precautions   Braces: Aspen collar    Recommendations:     Discharge Recommendations:  home health OT  Level of Assistance Recommended at Discharge: Intermittent assistance for ADL's and homemaking tasks  Discharge Equipment Recommendations: bedside commode, bath bench, wheelchair, walker, rolling  Barriers to discharge:  None    Assessment:     Manisha Wynne is a 72 y.o. female with a medical diagnosis of Cervical myelopathy.  She presents with limitations in performance of self-care, functional mobility, and ADLs. Performance deficits affecting function are weakness, impaired endurance, impaired functional mobility, impaired self care skills, gait instability, impaired balance, decreased lower extremity function, decreased upper extremity function, decreased safety awareness, pain, orthopedic precautions, impaired cardiopulmonary response to activity. Pt tolerated Tx without incident and is making progress but continues to require assist to perform self care tasks, functional mobility and functional transfers .  She would continue to benefit from OT intervention to further her functional (I)ce and safety.    Rehab Potential is good    Activity tolerance:  Good    Plan:     Patient to be seen 5 x/week to address the above listed problems via self-care/home management, therapeutic activities, therapeutic exercises    Plan of Care Expires: 12/24/22  Plan of Care Reviewed with: patient    Subjective     Communicated with: Nurse prior to session.    Pain/Comfort:  Pain Rating 1: 6/10  Location - Side 1: Bilateral  Location - Orientation 1: posterior  Location 1: neck  Pain Addressed 1: Reposition, Distraction, Pre-medicate for activity  Pain Rating Post-Intervention 1:  (not rated)    Patient's  cultural, spiritual, Druze conflicts given the current situation:  no    Objective:     Patient found HOB elevated with cervical collar upon OT entry to room.    Bed Mobility:    Patient completed Scooting to EOB with supervision     Functional Mobility/Transfers:  Patient completed Sit <> Stand Transfer with supervision  with  rolling walker   Patient completed Toilet Transfer Step Transfer technique with supervision with  rolling walker  Functional Mobility: Pt ambulated from EOB to toilet and then to wheel chair. Pt propelled W/C from room to therapy gym with sup for a total of 15 ft using BUE to propel chair.    Activities of Daily Living:  Grooming: modified independence standing sinkside with set up  Upper Body Dressing: minimum assistance to tighten Aspen Collar  Lower Body Dressing: supervision using RW when standing to manage pants  Toileting: supervision      [facility] 6 Click ADL: 20    Treatment & Education:  Pt participated in standing activity with sup and RW. Pt at raised counter to perform fine motor and visual scanning activity with focus on standing tolerance, functional reaching, dynamic standing bal, crossing midline, and to promote independence with homemaking and self care tasks. Pt tolerated standing for 9 min and 36 sec     Pt educated on role of OT, safety while performing functional transfers, safety while performing self care tasks, orthopedic precautions, importance to adhering to precautions and progress towards OT goals    Patient left up in chair with call button in reach    GOALS:   Multidisciplinary Problems       Occupational Therapy Goals          Problem: Occupational Therapy    Goal Priority Disciplines Outcome Interventions   Occupational Therapy Goal     OT, PT/OT Ongoing, Progressing    Description: Goals to be met by: 12/24/22     Patient will increase functional independence with ADLs by performing:    UE Dressing with Modified [place].  LE Dressing with Modified  [place].  Grooming while standing at sink with Modified [place].  Toileting from toilet with Modified [place] for hygiene and clothing management.   Bathing from  shower chair/bench with Supervision.  Step transfer with Modified [place]  Upper extremity exercise program with supervision.  Caregiver will be educated on level of assistance required to safely perform self care and functional transfers.                         Time Tracking:     OT Date of Treatment: 12/08/22  OT Start Time: 0905    OT Stop Time: 0950  OT Total Time (min): 45 min    Billable Minutes:Self Care/Home Management 28  Therapeutic Activity 17    12/8/2022

## 2022-12-09 LAB — SARS-COV-2 RNA RESP QL NAA+PROBE: NOT DETECTED

## 2022-12-09 PROCEDURE — 97535 SELF CARE MNGMENT TRAINING: CPT | Mod: CO

## 2022-12-09 PROCEDURE — 25000242 PHARM REV CODE 250 ALT 637 W/ HCPCS: Performed by: HOSPITALIST

## 2022-12-09 PROCEDURE — 97110 THERAPEUTIC EXERCISES: CPT | Mod: CQ

## 2022-12-09 PROCEDURE — 97530 THERAPEUTIC ACTIVITIES: CPT | Mod: CQ

## 2022-12-09 PROCEDURE — 11000004 HC SNF PRIVATE

## 2022-12-09 PROCEDURE — 97116 GAIT TRAINING THERAPY: CPT | Mod: CQ

## 2022-12-09 PROCEDURE — 63600175 PHARM REV CODE 636 W HCPCS: Performed by: NURSE PRACTITIONER

## 2022-12-09 PROCEDURE — 25000003 PHARM REV CODE 250: Performed by: HOSPITALIST

## 2022-12-09 PROCEDURE — 25000003 PHARM REV CODE 250: Performed by: NURSE PRACTITIONER

## 2022-12-09 RX ADMIN — CHOLECALCIFEROL TAB 125 MCG (5000 UNIT) 5000 UNITS: 125 TAB at 09:12

## 2022-12-09 RX ADMIN — ACETAMINOPHEN 1000 MG: 500 TABLET ORAL at 06:12

## 2022-12-09 RX ADMIN — Medication 70 MG: at 03:12

## 2022-12-09 RX ADMIN — FLUOXETINE 40 MG: 10 CAPSULE ORAL at 09:12

## 2022-12-09 RX ADMIN — ACETAMINOPHEN 1000 MG: 500 TABLET ORAL at 09:12

## 2022-12-09 RX ADMIN — METOPROLOL TARTRATE 25 MG: 25 TABLET, FILM COATED ORAL at 09:12

## 2022-12-09 RX ADMIN — Medication 6 MG: at 09:12

## 2022-12-09 RX ADMIN — LOSARTAN POTASSIUM 25 MG: 25 TABLET, FILM COATED ORAL at 09:12

## 2022-12-09 RX ADMIN — OXYCODONE HYDROCHLORIDE 15 MG: 10 TABLET ORAL at 09:12

## 2022-12-09 RX ADMIN — GABAPENTIN 100 MG: 100 CAPSULE ORAL at 09:12

## 2022-12-09 RX ADMIN — Medication 1 TABLET: at 09:12

## 2022-12-09 RX ADMIN — GABAPENTIN 100 MG: 100 CAPSULE ORAL at 02:12

## 2022-12-09 RX ADMIN — ENOXAPARIN SODIUM 40 MG: 100 INJECTION SUBCUTANEOUS at 04:12

## 2022-12-09 RX ADMIN — BUPROPION HYDROCHLORIDE 300 MG: 300 TABLET, FILM COATED, EXTENDED RELEASE ORAL at 09:12

## 2022-12-09 RX ADMIN — ACETAMINOPHEN 1000 MG: 500 TABLET ORAL at 02:12

## 2022-12-09 RX ADMIN — PRAVASTATIN SODIUM 20 MG: 20 TABLET ORAL at 09:12

## 2022-12-09 RX ADMIN — SENNOSIDES AND DOCUSATE SODIUM 1 TABLET: 50; 8.6 TABLET ORAL at 09:12

## 2022-12-09 NOTE — PLAN OF CARE
Patient remained free of injury, trauma, or falls during  shift.   Patient is alert  and oriented x 4. Patient ambulate to bathroom with steady gait. Cervical  collar in place while ambulating. Denies other needs.   Care plan reviewed with patient, questions encouraged.   Vitals remained stable throughout shift, will continue to monitor     Problem: Adult Inpatient Plan of Care  Goal: Plan of Care Review  Outcome: Ongoing, Progressing  Flowsheets (Taken 12/8/2022 2347)  Plan of Care Reviewed With: patient  Goal: Patient-Specific Goal (Individualized)  Outcome: Ongoing, Progressing  Goal: Absence of Hospital-Acquired Illness or Injury  Outcome: Ongoing, Progressing  Intervention: Identify and Manage Fall Risk  Flowsheets (Taken 12/8/2022 2347)  Safety Promotion/Fall Prevention:   assistive device/personal item within reach   diversional activities provided   Fall Risk reviewed with patient/family   Fall Risk signage in place   lighting adjusted   medications reviewed   side rails raised x 2   instructed to call staff for mobility  Goal: Optimal Comfort and Wellbeing  Outcome: Ongoing, Progressing  Intervention: Provide Person-Centered Care  Flowsheets (Taken 12/8/2022 2347)  Trust Relationship/Rapport:   care explained   questions encouraged   choices provided   reassurance provided   emotional support provided   thoughts/feelings acknowledged   empathic listening provided   questions answered  Goal: Readiness for Transition of Care  Outcome: Ongoing, Progressing     Problem: Skin Injury Risk Increased  Goal: Skin Health and Integrity  Outcome: Ongoing, Progressing     Problem: Fall Injury Risk  Goal: Absence of Fall and Fall-Related Injury  Outcome: Ongoing, Progressing

## 2022-12-09 NOTE — PT/OT/SLP PROGRESS
"Physical Therapy Treatment    Patient Name:  Manisha Wynne   MRN:  [id number]  Admit Date: 11/23/2022  Admitting Diagnosis: Cervical myelopathy  Recent Surgeries:     General Precautions: Standard, fall  Orthopedic Precautions: spinal precautions  Braces: Aspen collar    Recommendations:     Discharge Recommendations: home health PT  Level of Assistance Recommended at Discharge: Intermittent assistance   Discharge Equipment Recommendations: bedside commode, bath bench, wheelchair, walker, rolling  Barriers to discharge: None    Assessment:     Manisha Wynne is a 72 y.o. female admitted with a medical diagnosis of Cervical myelopathy . Pt tolerated well, pt would continue to benefit from skilled PT services to improve overall functional mobility, strength and endurance.  .      Performance deficits affecting function: weakness, impaired endurance, impaired functional mobility, gait instability, impaired balance, decreased lower extremity function, decreased safety awareness, pain, orthopedic precautions.    Rehab Potential is good    Activity Tolerance: Fair    Plan:     Patient to be seen 6 x/week to address the above listed problems via gait training, therapeutic activities, therapeutic exercises, neuromuscular re-education, wheelchair management/training    Plan of Care Expires: 12/25/22  Plan of Care Reviewed with: patient    Subjective     "I'm ready for you today".     Pain/Comfort:  Pain Rating 1: 0/10  Location 1: neck  Pain Rating Post-Intervention 1: 0/10    Patient's cultural, spiritual, Buddhism conflicts given the current situation:  no    Objective:      Patient found with cervical collar (in BSC) upon PT entry to room.     Therapeutic Activities and Exercises: recumbent cross  x 10 minutes L-3    Functional Mobility:  Transfers:     Sit to Stand:  supervision and stand by assistance with rolling walker and vcs for tech , ant wt shifting over MANOHAR  Bed to Chair and nustep : stand " "by assistance with  rolling walker  using  Stand Pivot  Gait: amb with RW SBA ~ 356 ft  to the gym and ~ 200 ft back to the room no LOB  Stairs:  asc/robin 4 steps with 1 rail close SBA x 3 trials, 1 slight LOB descending pt able to maintain bal with CGA  Curb asc/robin 4" curb with RW SBA  Balance dyn standing bal activities  x 15 reps catch/toss medium soft orange ball CGA and catch/bounce small physio ball CGA  Side stepping with RW through narrow space close SBA ~ 4 ft     AM-PAC 6 CLICK MOBILITY  20    Patient left up in chair with call button in reach and belonging sin reach .    GOALS:   Multidisciplinary Problems       Physical Therapy Goals          Problem: Physical Therapy    Goal Priority Disciplines Outcome Goal Variances Interventions   Physical Therapy Goal     PT, PT/OT Ongoing, Progressing     Description: Goals to be met by: 12/25/22    Patient will increase functional independence with mobility by performing:    . Supine to sit with Modified [place]  . Sit to supine with Modified [place]  . Rolling to Left and Right with Modified [place].  . Sit to stand transfer with Supervision - Met 12/5  Updated Goal: Sit to stand transfer with Mod I using RW    . Bed to chair transfer with Supervision using Rolling Walker/no AD - Met 12/5  Updated Goal: Bed to chair transfer with Mod I using RW    . Gait  x 150 feet with Supervision using Rolling Walker.   . Wheelchair propulsion x150 feet with Modified [place] using bilateral uppper extremities  . Ascend/descend 12 stair with bilateral Handrails Supervision using No Assistive Device.   . Ascend/Descend 4 inch curb step with Supervision using Rolling Walker.                         Time Tracking:     PT Received On: 12/09/22  PT Start Time: 1312  PT Stop Time: 1405  PT Total Time (min): 53 min    Billable Minutes: Gait Training 20, Therapeutic Activity 23, and Therapeutic Exercise 10    Treatment Type: Treatment  PT/PTA: PTA     PTA Visit " Number: 3     12/09/2022

## 2022-12-09 NOTE — PT/OT/SLP PROGRESS
Occupational Therapy   Treatment    Name: Manisha Wynne  MRN: [id number]  Admit Date: 11/23/2022  Admitting Diagnosis:  Cervical myelopathy    General Precautions: Standard, fall   Orthopedic Precautions: spinal precautions   Braces: Aspen collar    Recommendations:     Discharge Recommendations:  home health OT  Level of Assistance Recommended at Discharge: Intermittent assistance for ADL's and homemaking tasks  Discharge Equipment Recommendations: bedside commode, bath bench, wheelchair, walker, rolling  Barriers to discharge:  None    Assessment:     Manisha Wynne is a 72 y.o. female with a medical diagnosis of Cervical myelopathy.  She presents with limitations in performance of self-care, functional mobility, and ADLs. Performance deficits affecting function are weakness, impaired endurance, impaired functional mobility, impaired self care skills, gait instability, impaired balance, decreased lower extremity function, decreased upper extremity function, decreased safety awareness, pain, orthopedic precautions, impaired cardiopulmonary response to activity. Pt tolerated Tx without incident and is making progress but continues to require assist to perform self care tasks, functional mobility and functional transfers .  She would continue to benefit from OT intervention to further her functional (I)ce and safety.      Rehab Potential is good    Activity tolerance:  Good    Plan:     Patient to be seen 5 x/week to address the above listed problems via self-care/home management, therapeutic activities, therapeutic exercises    Plan of Care Expires: 12/24/22  Plan of Care Reviewed with: patient    Subjective     Communicated with: Nurse prior to session.    Pain/Comfort:  Pain Rating 1: 5/10  Location - Side 1: Bilateral  Location - Orientation 1: posterior  Location 1: neck  Pain Addressed 1: Reposition, Distraction  Pain Rating Post-Intervention 1:  (did not rate)    Patient's cultural, spiritual,  Congregational conflicts given the current situation:  no    Objective:     Patient found HOB elevated with cervical collar upon OT entry to room.    Bed Mobility:    Patient completed Scooting/Bridging with supervision     Functional Mobility/Transfers:  Patient completed Sit <> Stand Transfer with supervision  with  rolling walker   Patient completed Toilet Transfer Step Transfer technique with supervision with  rolling walker  Functional Mobility: Pt ambulated from EOB to toilet and then to bedside chair with sup and RW.     Activities of Daily Living:  Grooming: modified independence standing sinkside with set up  Upper Body Dressing: minimum assistance to adjust Aspen collar  Lower Body Dressing: supervision standing to manage pants with RW  Toileting: supervision with bedside commode positioned over toilet    [facility] 6 Click ADL: 20    Treatment & Education:  Pt educated on role of OT, safety while performing functional transfers, safety while performing self care tasks, orthopedic precautions, importance to adhering to precautions and progress towards OT goals    Patient left  up in bedside chair  with call button in reach    GOALS:   Multidisciplinary Problems       Occupational Therapy Goals          Problem: Occupational Therapy    Goal Priority Disciplines Outcome Interventions   Occupational Therapy Goal     OT, PT/OT Ongoing, Progressing    Description: Goals to be met by: 12/24/22     Patient will increase functional independence with ADLs by performing:    UE Dressing with Modified [place].  LE Dressing with Modified [place].  Grooming while standing at sink with Modified [place].  Toileting from toilet with Modified [place] for hygiene and clothing management.   Bathing from  shower chair/bench with Supervision.  Step transfer with Modified [place]  Upper extremity exercise program with supervision.  Caregiver will be educated on level of assistance required to safely perform self  care and functional transfers.                         Time Tracking:     OT Date of Treatment: 12/09/22  OT Start Time: 0904    OT Stop Time: 0930  OT Total Time (min): 26 min    Billable Minutes:Self Care/Home Management 26 12/9/2022

## 2022-12-10 PROCEDURE — 25000003 PHARM REV CODE 250: Performed by: NURSE PRACTITIONER

## 2022-12-10 PROCEDURE — 25000003 PHARM REV CODE 250: Performed by: HOSPITALIST

## 2022-12-10 PROCEDURE — 97110 THERAPEUTIC EXERCISES: CPT

## 2022-12-10 PROCEDURE — 25000242 PHARM REV CODE 250 ALT 637 W/ HCPCS: Performed by: HOSPITALIST

## 2022-12-10 PROCEDURE — 11000004 HC SNF PRIVATE

## 2022-12-10 PROCEDURE — 97116 GAIT TRAINING THERAPY: CPT

## 2022-12-10 PROCEDURE — 63600175 PHARM REV CODE 636 W HCPCS: Performed by: NURSE PRACTITIONER

## 2022-12-10 PROCEDURE — 97530 THERAPEUTIC ACTIVITIES: CPT

## 2022-12-10 RX ADMIN — Medication 70 MG: at 08:12

## 2022-12-10 RX ADMIN — GABAPENTIN 100 MG: 100 CAPSULE ORAL at 10:12

## 2022-12-10 RX ADMIN — METOPROLOL TARTRATE 25 MG: 25 TABLET, FILM COATED ORAL at 10:12

## 2022-12-10 RX ADMIN — SENNOSIDES AND DOCUSATE SODIUM 1 TABLET: 50; 8.6 TABLET ORAL at 10:12

## 2022-12-10 RX ADMIN — CHOLECALCIFEROL TAB 125 MCG (5000 UNIT) 5000 UNITS: 125 TAB at 08:12

## 2022-12-10 RX ADMIN — ACETAMINOPHEN 1000 MG: 500 TABLET ORAL at 10:12

## 2022-12-10 RX ADMIN — GABAPENTIN 100 MG: 100 CAPSULE ORAL at 08:12

## 2022-12-10 RX ADMIN — GABAPENTIN 100 MG: 100 CAPSULE ORAL at 02:12

## 2022-12-10 RX ADMIN — Medication 1 TABLET: at 08:12

## 2022-12-10 RX ADMIN — OXYCODONE HYDROCHLORIDE 15 MG: 10 TABLET ORAL at 10:12

## 2022-12-10 RX ADMIN — ENOXAPARIN SODIUM 40 MG: 100 INJECTION SUBCUTANEOUS at 06:12

## 2022-12-10 RX ADMIN — METOPROLOL TARTRATE 25 MG: 25 TABLET, FILM COATED ORAL at 08:12

## 2022-12-10 RX ADMIN — LOSARTAN POTASSIUM 25 MG: 25 TABLET, FILM COATED ORAL at 10:12

## 2022-12-10 RX ADMIN — BUPROPION HYDROCHLORIDE 300 MG: 300 TABLET, FILM COATED, EXTENDED RELEASE ORAL at 10:12

## 2022-12-10 RX ADMIN — Medication 6 MG: at 10:12

## 2022-12-10 RX ADMIN — ACETAMINOPHEN 1000 MG: 500 TABLET ORAL at 02:12

## 2022-12-10 RX ADMIN — ACETAMINOPHEN 1000 MG: 500 TABLET ORAL at 05:12

## 2022-12-10 RX ADMIN — PRAVASTATIN SODIUM 20 MG: 20 TABLET ORAL at 08:12

## 2022-12-10 RX ADMIN — FLUOXETINE 40 MG: 10 CAPSULE ORAL at 10:12

## 2022-12-10 NOTE — PROGRESS NOTES
"[facility] - Skilled Nursing  Adult Nutrition  Progress Note    SUMMARY   Recommendations  Continue regular Kosher diet,RD following  Goals: Pt to continue to meet > 75% EEN by RD follow up  Nutrition Goal Status: progressing towards goal  Communication of RD Recs: other (comment) (POC)    Assessment and Plan   Increased nutrient needs     Related to (etiology):   Wound healing     Signs and Symptoms (as evidenced by):   Incision to neck     Interventions(treatment strategy):  General, Healthful diet  Collaboration with other providers     Nutrition Diagnosis Status: continues    Malnutrition Ceobmtxhfg32/28/22     Skin (Micronutrient): pallor, wounds unhealed  Neck/Chest (Micronutrient): muscle wasting  Musculoskeletal/Lower Extremities: muscle wasting           Orbital Region (Subcutaneous Fat Loss): mild depletion  Thoracic and Lumbar Region: well nourished   Alevism Region (Muscle Loss): mild depletion  Clavicle and Acromion Bone Region (Muscle Loss): mild depletion  Scapular Bone Region (Muscle Loss): mild depletion  Dorsal Hand (Muscle Loss): mild depletion  Anterior Thigh Region (Muscle Loss): mild depletion                 Reason for Assessment    Reason For Assessment: RD follow-up  Diagnosis:  (cervical myelopathy)  Relevant Medical History: Afib, depression, HTN, HLD  Interdisciplinary Rounds: did not attend  General Information Comments: patient is satisfied with the Kosher meal from outside provided, she also has snacks and outside food from visitors, discussed her dc plans  Nutrition Discharge Planning: Discharge on Cardiac Diet    Nutrition/Diet History    Spiritual, Cultural Beliefs, Scientology Practices, Values that Affect Care: yes  Food Allergies: NKFA  Factors Affecting Nutritional Intake: None identified at this time    Anthropometrics    Temp: 97 °F (36.1 °C)  Height Method: Stated  Height: 5' 8" (172.7 cm)  Height (inches): 68 in  Weight Method: Standard Scale  Weight: 73.4 kg (161 lb 13.1 " oz)  Weight (lb): 161.82 lb  Ideal Body Weight (IBW), Female: 140 lb  % Ideal Body Weight, Female (lb): 117.47 %  BMI (Calculated): 24.6       Lab/Procedures/Meds    Pertinent Labs Reviewed: reviewed  Pertinent Labs Comments: PO$ 4.6, Hg 9.1, Hct 29.2,  Pertinent Medications Reviewed: reviewed  Pertinent Medications Comments: Vit D, statin    Estimated/Assessed Needs    Weight Used For Calorie Calculations: 74.4 kg (164 lb)  Energy Calorie Requirements (kcal): 1550 kcal/day based on MSJ x 1.2 AF  Energy Need Method: Duchesne-St Jeor  Protein Requirements: 75-89 g/day based on 1-1.2 g/kg  Weight Used For Protein Calculations: 74.4 kg (164 lb)  Fluid Requirements (mL): 1 mL/kcal or per MD  Estimated Fluid Requirement Method: RDA Method  RDA Method (mL): 1550         Nutrition Prescription Ordered    Current Diet Order: Regular  Nutrition Order Comments: %    Evaluation of Received Nutrient/Fluid Intake    I/O: no data  Energy Calories Required: meeting needs  Protein Required: meeting needs  Fluid Required: meeting needs  Comments: LBM 12/8  Tolerance: tolerating  % Intake of Estimated Energy Needs: 75 - 100 %  % Meal Intake: 75 - 100 %    Nutrition Risk    Level of Risk/Frequency of Follow-up: low (one time per week)     Monitor and Evaluation    Food and Nutrient Intake: energy intake, food and beverage intake, enteral nutrition intake  Food and Nutrient Adminstration: diet order  Knowledge/Beliefs/Attitudes: food and nutrition knowledge/skill, beliefs and attitudes  Physical Activity and Function: nutrition-related ADLs and IADLs  Anthropometric Measurements: weight change, body mass index, weight  Biochemical Data, Medical Tests and Procedures: lipid profile, electrolyte and renal panel, gastrointestinal profile, glucose/endocrine profile, inflammatory profile  Nutrition-Focused Physical Findings: overall appearance, extremities, muscles and bones, skin     Nutrition Follow-Up    RD Follow-up?: Yes

## 2022-12-10 NOTE — PT/OT/SLP PROGRESS
Physical Therapy Treatment    Patient Name:  Manisha Wynne   MRN:  [id number]  Admit Date: 11/23/2022  Admitting Diagnosis: Cervical myelopathy      General Precautions: Standard, fall  Orthopedic Precautions: spinal precautions  Braces: Aspen collar    Recommendations:     Discharge Recommendations: home health PT  Level of Assistance Recommended at Discharge: Intermittent assistance   Discharge Equipment Recommendations: bedside commode, bath bench, wheelchair, walker, rolling  Barriers to discharge: None    Assessment:     Manisha Wynne is a 72 y.o. female admitted with a medical diagnosis of Cervical myelopathy . Pt continues to make good functional progress and will be ready to d/c on her expected d/c date.      Performance deficits affecting function: weakness, impaired endurance, impaired self care skills, impaired functional mobility, gait instability, impaired balance, decreased lower extremity function, impaired cardiopulmonary response to activity, orthopedic precautions.    Rehab Potential is good    Activity Tolerance: Good    Plan:     Patient to be seen 6 x/week to address the above listed problems via gait training, therapeutic activities, therapeutic exercises, neuromuscular re-education, wheelchair management/training    Plan of Care Expires: 12/25/22  Plan of Care Reviewed with: patient    Subjective     Pt. Agreeable to work with PT.     Pain/Comfort:  Pain Rating 1: 0/10  Pain Rating Post-Intervention 1: 0/10    Patient's cultural, spiritual, Advent conflicts given the current situation:  no    Objective:     Communicated with pt's nurse prior to session.  Patient found with bed in chair position with cervical collar upon PT entry to room.     Therapeutic Activities and Exercises: Nustep x 15mins with resistance set @ 5 to help improve pt's overall cardiovascular endurance.    Functional Mobility:  Bed Mobility:     Supine to Sit: modified independence  Transfers:     Sit to  "Stand:  supervision with no AD and rolling walker  Bed to Chair: supervision with  no AD  using  Stand Pivot  Gait: ~240ft with RW and (S) for safety   Stairs:  Pt ascended/descended 12 stair(s) and 4" curb step with Rolling Walker with unirail with Stand-by Assistance.   Wheelchair Propulsion:  Pt propelled Standard wheelchair x >200 feet on Level tile with  Bilateral upper extremity with Modified Independent.     AM-PAC 6 CLICK MOBILITY  20    Patient left up in chair with call button in reach.    GOALS:   Multidisciplinary Problems       Physical Therapy Goals          Problem: Physical Therapy    Goal Priority Disciplines Outcome Goal Variances Interventions   Physical Therapy Goal     PT, PT/OT Ongoing, Progressing     Description: Goals to be met by: 12/25/22    Patient will increase functional independence with mobility by performing:    . Supine to sit with Modified [place]  . Sit to supine with Modified [place]  . Rolling to Left and Right with Modified [place].  . Sit to stand transfer with Supervision - Met 12/5  Updated Goal: Sit to stand transfer with Mod I using RW    . Bed to chair transfer with Supervision using Rolling Walker/no AD - Met 12/5  Updated Goal: Bed to chair transfer with Mod I using RW    . Gait  x 150 feet with Supervision using Rolling Walker.   . Wheelchair propulsion x150 feet with Modified [place] using bilateral uppper extremities  . Ascend/descend 12 stair with bilateral Handrails Supervision using No Assistive Device.   . Ascend/Descend 4 inch curb step with Supervision using Rolling Walker.                         Time Tracking:     PT Received On: 12/10/22  PT Start Time: 1025  PT Stop Time: 1105  PT Total Time (min): 40 min    Billable Minutes: Gait Training 15mins, Therapeutic Activity 10mins, and Therapeutic Exercise 15mins    Treatment Type: Treatment  PT/PTA: PT     PTA Visit Number: 0     12/10/2022  "

## 2022-12-11 PROCEDURE — 25000003 PHARM REV CODE 250: Performed by: HOSPITALIST

## 2022-12-11 PROCEDURE — 11000004 HC SNF PRIVATE

## 2022-12-11 PROCEDURE — 25000242 PHARM REV CODE 250 ALT 637 W/ HCPCS: Performed by: HOSPITALIST

## 2022-12-11 PROCEDURE — 63600175 PHARM REV CODE 636 W HCPCS: Performed by: NURSE PRACTITIONER

## 2022-12-11 PROCEDURE — 25000003 PHARM REV CODE 250: Performed by: NURSE PRACTITIONER

## 2022-12-11 RX ADMIN — OXYCODONE HYDROCHLORIDE 15 MG: 10 TABLET ORAL at 10:12

## 2022-12-11 RX ADMIN — ACETAMINOPHEN 1000 MG: 500 TABLET ORAL at 02:12

## 2022-12-11 RX ADMIN — FLUOXETINE 40 MG: 10 CAPSULE ORAL at 08:12

## 2022-12-11 RX ADMIN — BUPROPION HYDROCHLORIDE 300 MG: 300 TABLET, FILM COATED, EXTENDED RELEASE ORAL at 08:12

## 2022-12-11 RX ADMIN — ENOXAPARIN SODIUM 40 MG: 100 INJECTION SUBCUTANEOUS at 05:12

## 2022-12-11 RX ADMIN — PRAVASTATIN SODIUM 20 MG: 20 TABLET ORAL at 08:12

## 2022-12-11 RX ADMIN — GABAPENTIN 100 MG: 100 CAPSULE ORAL at 08:12

## 2022-12-11 RX ADMIN — GABAPENTIN 100 MG: 100 CAPSULE ORAL at 02:12

## 2022-12-11 RX ADMIN — METOPROLOL TARTRATE 25 MG: 25 TABLET, FILM COATED ORAL at 08:12

## 2022-12-11 RX ADMIN — CHOLECALCIFEROL TAB 125 MCG (5000 UNIT) 5000 UNITS: 125 TAB at 08:12

## 2022-12-11 RX ADMIN — LOSARTAN POTASSIUM 25 MG: 25 TABLET, FILM COATED ORAL at 08:12

## 2022-12-11 RX ADMIN — Medication 1 TABLET: at 08:12

## 2022-12-11 RX ADMIN — ACETAMINOPHEN 1000 MG: 500 TABLET ORAL at 09:12

## 2022-12-11 RX ADMIN — ACETAMINOPHEN 1000 MG: 500 TABLET ORAL at 05:12

## 2022-12-11 RX ADMIN — Medication 6 MG: at 10:12

## 2022-12-11 RX ADMIN — Medication 70 MG: at 08:12

## 2022-12-12 LAB
ANION GAP SERPL CALC-SCNC: 8 MMOL/L (ref 8–16)
BASOPHILS # BLD AUTO: 0.02 K/UL (ref 0–0.2)
BASOPHILS NFR BLD: 0.4 % (ref 0–1.9)
BUN SERPL-MCNC: 17 MG/DL (ref 8–23)
CALCIUM SERPL-MCNC: 9.3 MG/DL (ref 8.7–10.5)
CHLORIDE SERPL-SCNC: 107 MMOL/L (ref 95–110)
CO2 SERPL-SCNC: 24 MMOL/L (ref 23–29)
CREAT SERPL-MCNC: 0.8 MG/DL (ref 0.5–1.4)
DIFFERENTIAL METHOD: ABNORMAL
EOSINOPHIL # BLD AUTO: 0.2 K/UL (ref 0–0.5)
EOSINOPHIL NFR BLD: 4.1 % (ref 0–8)
ERYTHROCYTE [DISTWIDTH] IN BLOOD BY AUTOMATED COUNT: 13.4 % (ref 11.5–14.5)
EST. GFR  (NO RACE VARIABLE): >60 ML/MIN/1.73 M^2
GLUCOSE SERPL-MCNC: 93 MG/DL (ref 70–110)
HCT VFR BLD AUTO: 28.4 % (ref 37–48.5)
HGB BLD-MCNC: 9.3 G/DL (ref 12–16)
IMM GRANULOCYTES # BLD AUTO: 0.01 K/UL (ref 0–0.04)
IMM GRANULOCYTES NFR BLD AUTO: 0.2 % (ref 0–0.5)
LYMPHOCYTES # BLD AUTO: 1.7 K/UL (ref 1–4.8)
LYMPHOCYTES NFR BLD: 34.4 % (ref 18–48)
MAGNESIUM SERPL-MCNC: 1.9 MG/DL (ref 1.6–2.6)
MCH RBC QN AUTO: 30.5 PG (ref 27–31)
MCHC RBC AUTO-ENTMCNC: 32.7 G/DL (ref 32–36)
MCV RBC AUTO: 93 FL (ref 82–98)
MONOCYTES # BLD AUTO: 0.7 K/UL (ref 0.3–1)
MONOCYTES NFR BLD: 14.7 % (ref 4–15)
NEUTROPHILS # BLD AUTO: 2.3 K/UL (ref 1.8–7.7)
NEUTROPHILS NFR BLD: 46.2 % (ref 38–73)
NRBC BLD-RTO: 0 /100 WBC
PHOSPHATE SERPL-MCNC: 4.4 MG/DL (ref 2.7–4.5)
PLATELET # BLD AUTO: 131 K/UL (ref 150–450)
PMV BLD AUTO: 11.4 FL (ref 9.2–12.9)
POTASSIUM SERPL-SCNC: 4.2 MMOL/L (ref 3.5–5.1)
RBC # BLD AUTO: 3.05 M/UL (ref 4–5.4)
SODIUM SERPL-SCNC: 139 MMOL/L (ref 136–145)
WBC # BLD AUTO: 4.89 K/UL (ref 3.9–12.7)

## 2022-12-12 PROCEDURE — 25000003 PHARM REV CODE 250: Performed by: NURSE PRACTITIONER

## 2022-12-12 PROCEDURE — 83735 ASSAY OF MAGNESIUM: CPT | Performed by: HOSPITALIST

## 2022-12-12 PROCEDURE — 63600175 PHARM REV CODE 636 W HCPCS: Performed by: NURSE PRACTITIONER

## 2022-12-12 PROCEDURE — 11000004 HC SNF PRIVATE

## 2022-12-12 PROCEDURE — 80048 BASIC METABOLIC PNL TOTAL CA: CPT | Performed by: HOSPITALIST

## 2022-12-12 PROCEDURE — 85025 COMPLETE CBC W/AUTO DIFF WBC: CPT | Performed by: HOSPITALIST

## 2022-12-12 PROCEDURE — 97530 THERAPEUTIC ACTIVITIES: CPT | Mod: CO

## 2022-12-12 PROCEDURE — 97530 THERAPEUTIC ACTIVITIES: CPT | Mod: CQ

## 2022-12-12 PROCEDURE — 25000242 PHARM REV CODE 250 ALT 637 W/ HCPCS: Performed by: HOSPITALIST

## 2022-12-12 PROCEDURE — 36415 COLL VENOUS BLD VENIPUNCTURE: CPT | Performed by: HOSPITALIST

## 2022-12-12 PROCEDURE — 84100 ASSAY OF PHOSPHORUS: CPT | Performed by: HOSPITALIST

## 2022-12-12 PROCEDURE — 25000003 PHARM REV CODE 250: Performed by: HOSPITALIST

## 2022-12-12 RX ADMIN — ACETAMINOPHEN 1000 MG: 500 TABLET ORAL at 09:12

## 2022-12-12 RX ADMIN — PRAVASTATIN SODIUM 20 MG: 20 TABLET ORAL at 09:12

## 2022-12-12 RX ADMIN — LOSARTAN POTASSIUM 25 MG: 25 TABLET, FILM COATED ORAL at 08:12

## 2022-12-12 RX ADMIN — OXYCODONE HYDROCHLORIDE 15 MG: 10 TABLET ORAL at 09:12

## 2022-12-12 RX ADMIN — GABAPENTIN 100 MG: 100 CAPSULE ORAL at 02:12

## 2022-12-12 RX ADMIN — ENOXAPARIN SODIUM 40 MG: 100 INJECTION SUBCUTANEOUS at 04:12

## 2022-12-12 RX ADMIN — ACETAMINOPHEN 1000 MG: 500 TABLET ORAL at 06:12

## 2022-12-12 RX ADMIN — Medication 6 MG: at 09:12

## 2022-12-12 RX ADMIN — FLUOXETINE 40 MG: 10 CAPSULE ORAL at 08:12

## 2022-12-12 RX ADMIN — GABAPENTIN 100 MG: 100 CAPSULE ORAL at 08:12

## 2022-12-12 RX ADMIN — METOPROLOL TARTRATE 25 MG: 25 TABLET, FILM COATED ORAL at 09:12

## 2022-12-12 RX ADMIN — Medication 70 MG: at 08:12

## 2022-12-12 RX ADMIN — Medication 1 TABLET: at 09:12

## 2022-12-12 RX ADMIN — GABAPENTIN 100 MG: 100 CAPSULE ORAL at 09:12

## 2022-12-12 RX ADMIN — ACETAMINOPHEN 1000 MG: 500 TABLET ORAL at 02:12

## 2022-12-12 RX ADMIN — METOPROLOL TARTRATE 25 MG: 25 TABLET, FILM COATED ORAL at 08:12

## 2022-12-12 RX ADMIN — BUPROPION HYDROCHLORIDE 300 MG: 300 TABLET, FILM COATED, EXTENDED RELEASE ORAL at 08:12

## 2022-12-12 RX ADMIN — CHOLECALCIFEROL TAB 125 MCG (5000 UNIT) 5000 UNITS: 125 TAB at 09:12

## 2022-12-12 NOTE — PLAN OF CARE
Interdisciplinary team, Christin Valenzuela, RN Nurse Manager, Melinda Cardona, RN Charge Nurse, Ioana De La Fuente, RN MDS Coordinator, Jennie De Leon PT, Rehab Supervisor, Guillaume Dotson, and Sonya Perez, Dietician, spoke to patient for care plan conference, weekly status update, and therapy progress update. Tentative discharge date set for  12/14/22.

## 2022-12-12 NOTE — PT/OT/SLP PROGRESS
"Physical Therapy Treatment    Patient Name:  Manisha Wynne   MRN:  [id number]  Admit Date: 11/23/2022  Admitting Diagnosis: Cervical myelopathy  Recent Surgeries:     General Precautions: Standard, fall  Orthopedic Precautions: spinal precautions  Braces: Aspen collar    Recommendations:     Discharge Recommendations: home health PT  Level of Assistance Recommended at Discharge: Intermittent assistance   Discharge Equipment Recommendations: bedside commode, bath bench, wheelchair, walker, rolling  Barriers to discharge: None    Assessment:     Manisha Wynne is a 72 y.o. female admitted with a medical diagnosis of Cervical myelopathy . Pt tolerated well, family trng with friend, pt would continue to benefit from skilled PT services to improve overall functional mobility, strength and endurance.  .      Performance deficits affecting function: weakness, impaired endurance, impaired self care skills, impaired functional mobility, gait instability, impaired balance, decreased lower extremity function, impaired cardiopulmonary response to activity, orthopedic precautions.    Rehab Potential is good    Activity Tolerance: Fair    Plan:     Patient to be seen 6 x/week to address the above listed problems via gait training, therapeutic activities, therapeutic exercises, neuromuscular re-education, wheelchair management/training    Plan of Care Expires: 12/25/22  Plan of Care Reviewed with: patient    Subjective     "Alright".     Pain/Comfort:  Pain Rating 1: 0/10  Pain Rating Post-Intervention 1: 0/10    Patient's cultural, spiritual, Sikhism conflicts given the current situation:  no    Objective:       Patient found with cervical collar (sitting EOB) upon PT entry to room.     Therapeutic Activities and Exercises: ed/demo seated therex, did not perform nustep, HHPT likely progress to standing therex  Pt able to recall 3/3 spinal prec  Patient and family/friends educated on safety/proper tech with " "overall functional mobility, to include bed mob, trfs, gait, therex, pain free ROM, WC mechanics/mobility, spinal precautions, fall precautions, steps,curb, level of A/S required upon D/C for patient care, also level of assistance may fluctuate pending environment/fatigue/pain,alertness, etc.. Discussed DME, HHPT. All questions answered within PTA scope of practice, all verbalized understanding, Deferred discharge questions and medical questions to SW/NP/MD.      Functional Mobility:  Bed Mobility:  HOB flat no rail   Rolling Left:  independence  Rolling Right: independence  Supine to Sit: independence  Sit to Supine: independence   Transfers:     Sit to Stand:  modified independence and supervision with rolling walker  Bed to Chair: modified independence and supervision with  rolling walker  using  Stand Pivot  Gait: amb with RW S ~ 200 ft x 2 to/from gym/room no LOB to include curb step and steps  Stairs: asc/robin 4 steps x 3 trials ( 12 steps) one rail SBA/S  Curb asc/robin 4" curb with RW SBA/S   Wheelchair Propulsion:  mod I with BUE    AM-PAC 6 CLICK MOBILITY  20    Patient left  sitting EOB  with call button in reach, friend  present, and belonging sin reach .    GOALS:   Multidisciplinary Problems       Physical Therapy Goals          Problem: Physical Therapy    Goal Priority Disciplines Outcome Goal Variances Interventions   Physical Therapy Goal     PT, PT/OT Ongoing, Progressing     Description: Goals to be met by: 12/25/22    Patient will increase functional independence with mobility by performing:    . Supine to sit with Modified [place]  . Sit to supine with Modified [place]  . Rolling to Left and Right with Modified [place].  . Sit to stand transfer with Supervision - Met 12/5  Updated Goal: Sit to stand transfer with Mod I using RW    . Bed to chair transfer with Supervision using Rolling Walker/no AD - Met 12/5  Updated Goal: Bed to chair transfer with Mod I using RW    . Gait  x 150 " feet with Supervision using Rolling Walker.   . Wheelchair propulsion x150 feet with Modified [place] using bilateral uppper extremities  . Ascend/descend 12 stair with bilateral Handrails Supervision using No Assistive Device.   . Ascend/Descend 4 inch curb step with Supervision using Rolling Walker.                         Time Tracking:     PT Received On: 12/12/22  PT Start Time: 1012  PT Stop Time: 1111  PT Total Time (min): 59 min    Billable Minutes: Therapeutic Activity 59    Treatment Type: Treatment  PT/PTA: PTA     PTA Visit Number: 1     12/12/2022

## 2022-12-12 NOTE — PT/OT/SLP PROGRESS
Occupational Therapy   Treatment/ Family Training    Name: Manisha Wynne  MRN: [id number]  Admit Date: 11/23/2022  Admitting Diagnosis:  Cervical myelopathy    General Precautions: Standard, fall   Orthopedic Precautions: spinal precautions   Braces: Aspen collar    Recommendations:     Discharge Recommendations:  home health OT  Level of Assistance Recommended at Discharge: Intermittent assistance for ADL's and homemaking tasks  Discharge Equipment Recommendations: bedside commode, bath bench, wheelchair, walker, rolling  Barriers to discharge:  None    Assessment:     Manisha Wynne is a 72 y.o. female with a medical diagnosis of Cervical myelopathy.  She presents with limitations in performance of self-care, functional mobility, and ADLs. Performance deficits affecting function are weakness, impaired endurance, impaired functional mobility, impaired self care skills, gait instability, impaired balance, decreased lower extremity function, decreased upper extremity function, decreased safety awareness, pain, orthopedic precautions, impaired cardiopulmonary response to activity.  Family training took place with pt and pt's friend. Pt's friend aware of pt's limitations and precautions. Pt tolerated Tx without incident and is making progress but continues to require assist to perform self care tasks, functional mobility and functional transfers.  She would continue to benefit from OT intervention to further her functional (I)ce and safety.    Rehab Potential is good    Activity tolerance:  Good    Plan:     Patient to be seen 5 x/week to address the above listed problems via self-care/home management, therapeutic activities, therapeutic exercises    Plan of Care Expires: 12/24/22  Plan of Care Reviewed with: patient    Subjective     Communicated with: Nurse prior to session.    Pain/Comfort:  Pain Rating 1: 0/10  Pain Rating Post-Intervention 1: 0/10    Patient's cultural, spiritual, Pentecostalism  conflicts given the current situation:  no    Objective:     Patient found  sitting EOB  with cervical collar upon OT entry to room.    Bed Mobility:    Pt's friend educated on bed mobility     Functional Mobility/Transfers:  Pt's friend educated on transfers and AD required for safely performing.    Activities of Daily Living:  Pt's friend aware of any limitations during ADLs.     [facility] 6 Click ADL: 20    Treatment & Education:  Pt and pt's friend educated on role of OT, safety while performing functional transfers, safety while performing self care tasks, orthopedic precautions, importance to adhering to precautions and progress towards OT goals    Patient left sitting edge of bed with call button in reach and friend present    GOALS:   Multidisciplinary Problems       Occupational Therapy Goals          Problem: Occupational Therapy    Goal Priority Disciplines Outcome Interventions   Occupational Therapy Goal     OT, PT/OT Ongoing, Progressing    Description: Goals to be met by: 12/24/22     Patient will increase functional independence with ADLs by performing:    UE Dressing with Modified [place].  LE Dressing with Modified [place].  Grooming while standing at sink with Modified [place].  Toileting from toilet with Modified [place] for hygiene and clothing management.   Bathing from  shower chair/bench with Supervision.  Step transfer with Modified [place]  Upper extremity exercise program with supervision.  Caregiver will be educated on level of assistance required to safely perform self care and functional transfers.                         Time Tracking:     OT Date of Treatment: 12/12/22  OT Start Time: 0927    OT Stop Time: 1006  OT Total Time (min): 39 min    Billable Minutes:Therapeutic Activity 39    12/12/2022

## 2022-12-12 NOTE — PROGRESS NOTES
[facility]                                  Skilled Nursing Facility                   Progress Note     Patient Name: Manisha Wynne  YOB: 1950  MRN: [id number]  Room: [place] [address]/[place] [address] A     Admit Date: 11/23/2022   CARO: 12/14/2022     Principal Problem: Cervical myelopathy    HPI obtained from patient interview and chart review     Chief Complaint:    Re-evaluation of medical treatment and therapy status: Lab review        HPI:   Manisha Wynne is a 72 y.o. female with PMH of cervical stenosis, myelopathy, and hypertension. She has an MRI-non-compatible pacemaker in place. s/p C3-C6 posterior cervical fusion on 11/14 by Dr. Cheek.     Patient will be treated at [facility] with PT and OT to improve functional status and ability to perform ADLs.     Interval History  All of today's labs reviewed and are listed below. No critical values noted. Discussed results with patient. 24 hr vital sign ranges listed below. Pain regimen tolerated. Patient denies shortness of breath, abdominal discomfort, nausea, or vomiting.  Patient reports an adequate appetite.  Patient denies dysuria.  Patient reports having regular bowel movements.  Patient progessing with PT/OT: .Gait: Patient ambulated 200 feet on level surface using RW with SBA. Patient ambulated 10 feet x 2 trials on unlevel mat using RW with SBA.  Continuing to follow and treat all acute and chronic conditions.    Past Medical History: Patient has a past medical history of Abnormal Pap smear, Atrial fibrillation, AV block, 1st degree (07/25/2012), C. difficile diarrhea, Cataract, Depression (07/24/2012), Facet arthritis of lumbar region (03/31/2015), Falls, Hyperlipidemia, Hypertension (07/24/2012), Neuropathy, Other specified cardiac dysrhythmias(427.89), Sleep apnea, Syncope (07/24/2012), and Tremors of nervous system.    Past Surgical History: Patient has a past  surgical history that includes Tonsillectomy; Dilation and curettage of uterus; Cardiac pacemaker placement (09/07/2012); Endoscopic ultrasound of upper gastrointestinal tract (N/A, 7/5/2019); Spring tooth extraction; Nasal septoplasty (N/A, 12/19/2019); Application of cartilage graft (Bilateral, 12/19/2019); Surgical removal of nasal turbinate (Bilateral, 12/19/2019); Open reduction and internal fixation (ORIF) of fracture of distal radius (Left, 1/24/2020); Myelography (N/A, 5/4/2021); Cataract extraction w/  intraocular lens implant (Right, 5/16/2022); Cataract extraction w/  intraocular lens implant (Left, 6/20/2022); Replacement of pacemaker generator (Left, 10/7/2022); and Posterior fusion of cervical spine with laminectomy (N/A, 11/14/2022).    Social History: Patient reports that she quit smoking about 40 years ago. Her smoking use included cigarettes. She has a 3.75 pack-year smoking history. She has quit using smokeless tobacco. She reports current alcohol use. She reports that she does not use drugs.    Family History:  family history is not on file. She was adopted.    Allergies: Patient has No Known Allergies.        Review of Systems   Constitutional:  Positive for malaise/fatigue. Negative for chills and fever.   HENT:  Negative for congestion, hearing loss and sore throat.    Eyes:  Negative for blurred vision and double vision.   Respiratory:  Negative for cough, sputum production, shortness of breath and wheezing.    Cardiovascular:  Negative for chest pain, palpitations and leg swelling.   Gastrointestinal:  Negative for abdominal pain, constipation, diarrhea, heartburn, nausea and vomiting.   Genitourinary:  Negative for dysuria and urgency.   Musculoskeletal:  Negative for back pain.   Skin:  Negative for rash.        + wound   Neurological:  Positive for weakness. Negative for dizziness and headaches.   Endo/Heme/Allergies:  Negative for polydipsia. Does not bruise/bleed easily.    Psychiatric/Behavioral:  Negative for depression and hallucinations. The patient is not nervous/anxious.        24 hour Vital Sign Range   Temp:  [97.5 °F (36.4 °C)-97.8 °F (36.6 °C)]   Pulse:  [60-61]   Resp:  [18]   BP: (140)/(65-69)   SpO2:  [96 %]       Physical Exam  Vitals and nursing note reviewed.   Constitutional:       General: She is not in acute distress.     Appearance: Normal appearance. She is not ill-appearing.   HENT:      Head: Normocephalic and atraumatic.      Nose: Nose normal. No congestion.      Mouth/Throat:      Mouth: Mucous membranes are moist.      Pharynx: Oropharynx is clear.   Eyes:      Extraocular Movements: Extraocular movements intact.      Conjunctiva/sclera: Conjunctivae normal.      Pupils: Pupils are equal, round, and reactive to light.   Neck:      Comments: C-collar in place    Cardiovascular:      Rate and Rhythm: Normal rate and regular rhythm.      Pulses: Normal pulses.      Heart sounds: Normal heart sounds. No murmur heard.  Pulmonary:      Effort: Pulmonary effort is normal. No respiratory distress.      Breath sounds: Normal breath sounds. No wheezing or rhonchi.   Abdominal:      General: Bowel sounds are normal. There is no distension.      Palpations: Abdomen is soft. There is no mass.      Tenderness: There is no abdominal tenderness.   Musculoskeletal:         General: No swelling or tenderness.      Cervical back: Normal range of motion and neck supple. No tenderness.      Right lower leg: No edema.      Left lower leg: No edema.   Skin:     General: Skin is warm and dry.      Capillary Refill: Capillary refill takes less than 2 seconds.      Findings: No erythema or rash.   Neurological:      Mental Status: She is alert and oriented to person, place, and time. Mental status is at baseline.      Motor: Weakness present.   Psychiatric:         Mood and Affect: Mood normal.         Behavior: Behavior normal.         Thought Content: Thought content normal.          Judgment: Judgment normal.     Full skin assessment completed by this NP           Incision/Site 06/25/15 1053 Left back (Active)   06/25/15 1053   Present Prior to Hospital Arrival?:    Side: Left   Location: back   Orientation:    Incision Type:    Closure Method:    Additional Comments:    Removal Indication and Assessment:    Wound Outcome:    Removal Indications:    Number of days: 2714            Incision/Site 11/14/22 1813 Neck (Active)   11/14/22 1813   Present Prior to Hospital Arrival?:    Side:    Location: Neck   Orientation:    Incision Type:    Closure Method:    Additional Comments:    Removal Indication and Assessment:    Wound Outcome:    Removal Indications:    Incision WDL ex 11/28/22 0950   Dressing Appearance Open to air 11/28/22 2126   Drainage Amount None 11/28/22 2126   Drainage Characteristics/Odor No odor 11/28/22 2126   Appearance Intact;Steri-strips intact 11/28/22 2126   Periwound Area Intact;Dry;Pink;Edematous 11/28/22 2126   Wound Edges Approximated 11/28/22 2126   Dressing Other (comment) 11/26/22 1001   Number of days: 15          Labs:  Recent Labs   Lab 12/08/22  0405 12/12/22  0557   WBC 5.33 4.89   HGB 9.1* 9.3*   HCT 29.2* 28.4*    131*       Recent Labs   Lab 12/08/22  0405 12/12/22  0557    139   K 4.5 4.2    107   CO2 27 24   BUN 17 17   CREATININE 0.9 0.8   GLU 79 93   CALCIUM 9.1 9.3   MG 2.0 1.9   PHOS 4.6* 4.4       No results for input(s): ALKPHOS, ALT, AST, ALBUMIN, PROT, BILITOT, INR in the last 168 hours.  No results for input(s): POCTGLUCOSE in the last 72 hours.    Meds Scheduled:   acetaminophen  1,000 mg Oral Q8H    buPROPion  300 mg Oral Daily    calcium-vitamin D3  1 tablet Oral QHS    cholecalciferol (vitamin D3)  5,000 Units Oral QHS    enoxaparin  40 mg Subcutaneous Daily    FLUoxetine  40 mg Oral Daily    gabapentin  100 mg Oral TID    lisdexamfetamine  70 mg Oral QAM    losartan  25 mg Oral Daily    methyl salicylate-menthol 15-10%    Topical (Top) QHS    metoprolol tartrate  25 mg Oral BID    pravastatin  20 mg Oral QHS       PRN:   acetaminophen, calcium carbonate, dicyclomine, melatonin, oxyCODONE, senna-docusate 8.6-50 mg      Assessment and Plan:    Cervical myelopathy  cervical myelopathy s/p C3-C6 posterior cervical fusion on 11/14 by Dr. Cheek    - HELEN collar to be worn when upright and OOB  - Pain control- Initiate order to increase oxycodone to 15 q 4 hours prn severe pain. Increased gabapentin to 200 mg tid. Continue scheduled robaxin and tylenol.   - Bowel regimen: senna BID and miralax daily. 11/28 - -Atelectasis prevention: IS hourly while awake, PT/OT, OOB > 6 hours per day  -DVT PPx- Lovenox 40mg daily     Hypertension   continue home meds. Metoprolol 25mg, Losartan 25mg    Anticipate disposition:    Home with home health      Follow-up needed during SNF admission:       Follow-up needed after discharge from SNF:   - PCP within 1-2 weeks  - See appt scheduled below     Future Appointments   Date Time Provider Department Center   12/22/2022 10:00 AM [facility] OIC-XRAY [facility] XRAY [facility]   12/22/2022 11:00 AM Nicolette Cheek MD [facility] NEUROS8 [facility]   1/5/2023 10:00 AM HOME MONITOR DEVICE CHECK, [facility] ARRRO [facility]   1/10/2023  8:45 AM EKG, APPT [facility] EKG [facility]   1/10/2023  9:00 AM COORDINATED DEVICE CHECK [facility] ARRHPSILVERIO [facility]   1/10/2023  9:30 AM Svetlana Miner NP [facility] ARRHYTH [facility]   2/2/2023 11:30 AM Sunitha Rosario PA-C [facility] NEURO8 [facility]   2/10/2023 10:00 AM LAB, APPOINTMENT [facility] INTMED [facility] LAB IM [facility]   2/23/2023  9:15 AM INJECTION [facility] AMB INF [facility]         I certify that SNF services are required to be given on an inpatient basis because Manisha Wynne needs for skilled nursing care and/or skilled rehabilitation are required on a daily basis and such services can only practically be provided in a skilled nursing facility setting and are for an ongoing condition for which she  received inpatient care in the hospital.            Angely De La Rosa NP  Department of Hospital Medicine   [facility]     DOS: 12/12/2022       Patient note was created using MModal Dictation.  Any errors in syntax or even information may not have been identified and edited on initial review prior to signing this note.

## 2022-12-13 LAB — SARS-COV-2 RNA RESP QL NAA+PROBE: NOT DETECTED

## 2022-12-13 PROCEDURE — 97116 GAIT TRAINING THERAPY: CPT

## 2022-12-13 PROCEDURE — U0003 INFECTIOUS AGENT DETECTION BY NUCLEIC ACID (DNA OR RNA); SEVERE ACUTE RESPIRATORY SYNDROME CORONAVIRUS 2 (SARS-COV-2) (CORONAVIRUS DISEASE [COVID-19]), AMPLIFIED PROBE TECHNIQUE, MAKING USE OF HIGH THROUGHPUT TECHNOLOGIES AS DESCRIBED BY CMS-2020-01-R: HCPCS | Performed by: HOSPITALIST

## 2022-12-13 PROCEDURE — 25000003 PHARM REV CODE 250: Performed by: NURSE PRACTITIONER

## 2022-12-13 PROCEDURE — 97110 THERAPEUTIC EXERCISES: CPT

## 2022-12-13 PROCEDURE — 63600175 PHARM REV CODE 636 W HCPCS: Performed by: NURSE PRACTITIONER

## 2022-12-13 PROCEDURE — 97535 SELF CARE MNGMENT TRAINING: CPT | Mod: CO

## 2022-12-13 PROCEDURE — U0005 INFEC AGEN DETEC AMPLI PROBE: HCPCS | Performed by: HOSPITALIST

## 2022-12-13 PROCEDURE — 11000004 HC SNF PRIVATE

## 2022-12-13 PROCEDURE — 25000242 PHARM REV CODE 250 ALT 637 W/ HCPCS: Performed by: HOSPITALIST

## 2022-12-13 PROCEDURE — 25000003 PHARM REV CODE 250: Performed by: HOSPITALIST

## 2022-12-13 RX ORDER — AMOXICILLIN 250 MG
1 CAPSULE ORAL 2 TIMES DAILY PRN
Qty: 60 TABLET | Refills: 0 | Status: SHIPPED | OUTPATIENT
Start: 2022-12-13 | End: 2023-01-12

## 2022-12-13 RX ORDER — FLUOXETINE HYDROCHLORIDE 40 MG/1
40 CAPSULE ORAL DAILY
Qty: 30 CAPSULE | Refills: 0 | Status: SHIPPED | OUTPATIENT
Start: 2022-12-13 | End: 2024-03-13

## 2022-12-13 RX ORDER — OXYCODONE HYDROCHLORIDE 10 MG/1
10 TABLET ORAL EVERY 4 HOURS PRN
Status: DISCONTINUED | OUTPATIENT
Start: 2022-12-13 | End: 2022-12-14 | Stop reason: HOSPADM

## 2022-12-13 RX ORDER — ACETAMINOPHEN 500 MG
5000 TABLET ORAL DAILY
Qty: 30 TABLET | Refills: 0 | Status: SHIPPED | OUTPATIENT
Start: 2022-12-13 | End: 2023-01-12

## 2022-12-13 RX ORDER — BUPROPION HYDROCHLORIDE 300 MG/1
300 TABLET ORAL DAILY
Qty: 30 TABLET | Refills: 0 | Status: SHIPPED | OUTPATIENT
Start: 2022-12-13 | End: 2023-07-06

## 2022-12-13 RX ORDER — DICYCLOMINE HYDROCHLORIDE 20 MG/1
20 TABLET ORAL EVERY 6 HOURS
Qty: 60 TABLET | Refills: 0 | Status: SHIPPED | OUTPATIENT
Start: 2022-12-13 | End: 2022-12-28

## 2022-12-13 RX ORDER — OXYCODONE HYDROCHLORIDE 10 MG/1
10 TABLET ORAL EVERY 6 HOURS PRN
Qty: 21 TABLET | Refills: 0 | Status: SHIPPED | OUTPATIENT
Start: 2022-12-13 | End: 2022-12-19

## 2022-12-13 RX ORDER — CALCIUM CARBONATE/VITAMIN D3 600MG-5MCG
1 TABLET ORAL DAILY
Qty: 30 TABLET | Refills: 0 | Status: ON HOLD | OUTPATIENT
Start: 2022-12-13 | End: 2024-02-25 | Stop reason: HOSPADM

## 2022-12-13 RX ORDER — GABAPENTIN 100 MG/1
100 CAPSULE ORAL 3 TIMES DAILY
Qty: 90 CAPSULE | Refills: 0 | Status: SHIPPED | OUTPATIENT
Start: 2022-12-13 | End: 2022-12-23 | Stop reason: SDUPTHER

## 2022-12-13 RX ADMIN — METOPROLOL TARTRATE 25 MG: 25 TABLET, FILM COATED ORAL at 09:12

## 2022-12-13 RX ADMIN — GABAPENTIN 100 MG: 100 CAPSULE ORAL at 09:12

## 2022-12-13 RX ADMIN — BUPROPION HYDROCHLORIDE 300 MG: 300 TABLET, FILM COATED, EXTENDED RELEASE ORAL at 09:12

## 2022-12-13 RX ADMIN — Medication 1 TABLET: at 09:12

## 2022-12-13 RX ADMIN — Medication 70 MG: at 07:12

## 2022-12-13 RX ADMIN — OXYCODONE HYDROCHLORIDE 10 MG: 10 TABLET ORAL at 09:12

## 2022-12-13 RX ADMIN — FLUOXETINE 40 MG: 10 CAPSULE ORAL at 09:12

## 2022-12-13 RX ADMIN — GABAPENTIN 100 MG: 100 CAPSULE ORAL at 03:12

## 2022-12-13 RX ADMIN — Medication 6 MG: at 09:12

## 2022-12-13 RX ADMIN — ACETAMINOPHEN 1000 MG: 500 TABLET ORAL at 06:12

## 2022-12-13 RX ADMIN — CHOLECALCIFEROL TAB 125 MCG (5000 UNIT) 5000 UNITS: 125 TAB at 09:12

## 2022-12-13 RX ADMIN — ENOXAPARIN SODIUM 40 MG: 100 INJECTION SUBCUTANEOUS at 05:12

## 2022-12-13 RX ADMIN — ACETAMINOPHEN 1000 MG: 500 TABLET ORAL at 03:12

## 2022-12-13 RX ADMIN — PRAVASTATIN SODIUM 20 MG: 20 TABLET ORAL at 09:12

## 2022-12-13 RX ADMIN — ACETAMINOPHEN 1000 MG: 500 TABLET ORAL at 09:12

## 2022-12-13 RX ADMIN — LOSARTAN POTASSIUM 25 MG: 25 TABLET, FILM COATED ORAL at 09:12

## 2022-12-13 NOTE — PLAN OF CARE
[facility] - Skilled Nursing      HOME HEALTH ORDERS  FACE TO FACE ENCOUNTER    Patient Name: Manisha Wynne  YOB: 1950    PCP: Iris Blue MD   PCP Address: [facility], [address] / [place] [facility]  PCP Phone Number: [phone]  PCP Fax: [phone]    Encounter Date: 11/18/22    Admit to Home Health    Diagnoses:  Active Hospital Problems    Diagnosis  POA    *Cervical myelopathy [G95.9]  Yes    Mixed stress and urge incontinence [N39.46]  Yes    Goiter, nontoxic, multinodular [E04.2]  Yes    Other osteoporosis without current pathological fracture [M81.8]  Yes    Primary hyperparathyroidism [E21.0]  Yes    CKD (chronic kidney disease) stage 3, GFR 30-59 ml/min [N18.30]  Yes    CEDRIC (obstructive sleep apnea) [G47.33]  Yes    Lumbar radiculopathy [M54.16]  Yes    Cardiac pacemaker in situ [Z95.0]  Yes    Dyslipidemia [E78.5]  Yes    Hypertension [I10]  Yes     Chronic    Atrial fibrillation [I48.91]  Yes     Suspected-- so far no documentation      Depression [F32.A]  Yes    SSS (sick sinus syndrome) [I49.5]  Yes     Chronic     Suspected-- so far no documentation        Resolved Hospital Problems   No resolved problems to display.       Follow Up Appointments:  Future Appointments   Date Time Provider Department Center   12/22/2022 10:00 AM [facility] OIC-XRAY [facility] XRAY [facility]   12/22/2022 11:00 AM Nicolette Cheek MD [facility] NEUROS8 [facility]   1/5/2023 10:00 AM HOME MONITOR DEVICE CHECK, [facility] ARRHPRO [facility]   1/10/2023  8:45 AM EKG, APPT [facility] EKG [facility]   1/10/2023  9:00 AM COORDINATED DEVICE CHECK [facility] SEYMOUR [facility]   1/10/2023  9:30 AM Svetlana Miner NP [facility] ARRHYTH [facility]   2/2/2023 11:30 AM Sunitha Rosario PA-C [facility] NEURO8 [facility]   2/10/2023 10:00 AM LAB, APPOINTMENT [facility] INTMED [facility] LAB IM [facility]   2/23/2023  9:15 AM INJECTION [facility] AMB INF [facility]       Allergies:Review of patient's allergies indicates:  No Known  Allergies    Medications: Review discharge medications with patient and family and provide education.    Current Facility-Administered Medications   Medication Dose Route Frequency Provider Last Rate Last Admin    acetaminophen tablet 1,000 mg  1,000 mg Oral Q8H Jamil Luther MD   1,000 mg at 12/13/22 0629    acetaminophen tablet 650 mg  650 mg Oral Q6H PRN Jamil Luther MD   650 mg at 11/27/22 2100    buPROPion TB24 tablet 300 mg  300 mg Oral Daily Jamil Luther MD   300 mg at 12/13/22 0921    calcium carbonate 200 mg calcium (500 mg) chewable tablet 500 mg  500 mg Oral BID PRN Jamil Luther MD        calcium-vitamin D3 500 mg-5 mcg (200 unit) per tablet 1 tablet  1 tablet Oral QHS Angely De La Rosa NP   1 tablet at 12/12/22 2132    cholecalciferol (vitamin D3) 125 mcg (5,000 unit) tablet 5,000 Units  5,000 Units Oral QHS Angely De La Rosa NP   5,000 Units at 12/12/22 2100    dicyclomine tablet 20 mg  20 mg Oral Q6H PRN Jamil Luther MD        enoxaparin injection 40 mg  40 mg Subcutaneous Daily Angely De La Rosa NP   40 mg at 12/12/22 1652    FLUoxetine capsule 40 mg  40 mg Oral Daily Jamil Luther MD   40 mg at 12/13/22 0921    gabapentin capsule 100 mg  100 mg Oral TID Jamil Luther MD   100 mg at 12/13/22 0921    lisdexamfetamine capsule 70 mg  70 mg Oral QAM Jamil Luther MD   70 mg at 12/13/22 0700    losartan tablet 25 mg  25 mg Oral Daily Jamil Luther MD   25 mg at 12/13/22 0921    melatonin tablet 6 mg  6 mg Oral Nightly PRN Jamil Luther MD   6 mg at 12/12/22 2133    methyl salicylate-menthol 15-10% cream   Topical (Top) QHS Jamil Luther MD   Given at 12/08/22 2113    metoprolol tartrate (LOPRESSOR) tablet 25 mg  25 mg Oral BID Jamil Luther MD   25 mg at 12/13/22 0921    oxyCODONE immediate release tablet Tab 10 mg  10 mg Oral Q4H PRN Angely De La Rosa NP        pravastatin tablet 20 mg  20 mg Oral QHS Angely De La Rosa, NP    20 mg at 12/12/22 2133    senna-docusate 8.6-50 mg per tablet 1 tablet  1 tablet Oral BID PRN Jamil Luther MD   1 tablet at 12/10/22 2242     Current Discharge Medication List        START taking these medications    Details   methyl salicylate-menthol 15-10% 15-10 % Crea Apply topically every evening.  Qty: 113 g, Refills: 0      senna-docusate 8.6-50 mg (PERICOLACE) 8.6-50 mg per tablet Take 1 tablet by mouth 2 (two) times daily as needed for Constipation.  Qty: 60 tablet, Refills: 0           CONTINUE these medications which have CHANGED    Details   buPROPion (WELLBUTRIN XL) 300 MG 24 hr tablet Take 1 tablet (300 mg total) by mouth once daily.  Qty: 30 tablet, Refills: 0      calcium-vitamin D tablet 600 mg-200 units Take 1 tablet by mouth once daily.  Qty: 30 tablet, Refills: 0      cholecalciferol, vitamin D3, 125 mcg (5,000 unit) Tab Take 1 tablet (5,000 Units total) by mouth once daily.  Qty: 30 tablet, Refills: 0      dicyclomine (BENTYL) 20 mg tablet Take 1 tablet (20 mg total) by mouth every 6 (six) hours. Takes prn for 15 days  Qty: 60 tablet, Refills: 0      FLUoxetine 40 MG capsule Take 1 capsule (40 mg total) by mouth once daily.  Qty: 30 capsule, Refills: 0      gabapentin (NEURONTIN) 100 MG capsule Take 1 capsule (100 mg total) by mouth 3 (three) times daily.  Qty: 90 capsule, Refills: 0      oxyCODONE (ROXICODONE) 10 mg Tab immediate release tablet Take 1 tablet (10 mg total) by mouth every 6 (six) hours as needed for Pain.  Qty: 21 tablet, Refills: 0    Comments: Quantity prescribed more than 7 day supply? No           CONTINUE these medications which have NOT CHANGED    Details   acetaminophen (TYLENOL) 500 MG tablet Take 2 tablets (1,000 mg total) by mouth every 8 (eight) hours.  Refills: 0      losartan (COZAAR) 25 MG tablet TAKE 1 TABLET EVERY DAY  Qty: 90 tablet, Refills: 6      metoprolol tartrate (LOPRESSOR) 25 MG tablet TAKE 1 TABLET TWICE DAILY  Qty: 180 tablet, Refills: 3     Associated Diagnoses: Hypertension, essential      pravastatin (PRAVACHOL) 20 MG tablet TAKE 1 TABLET EVERY DAY  Qty: 90 tablet, Refills: 3    Associated Diagnoses: Mixed hyperlipidemia           STOP taking these medications       methocarbamoL 1,000 mg Tab Comments:   Reason for Stopping:         lisdexamfetamine (VYVANSE) 70 MG capsule Comments:   Reason for Stopping:                 I have seen and examined this patient within the last 30 days. My clinical findings that support the need for the home health skilled services and home bound status are the following:no   Weakness/numbness causing balance and gait disturbance due to Weakness/Debility and Surgery making it taxing to leave home.  Requiring assistive device to leave home due to unsteady gait caused by  Weakness/Debility and Surgery.     Diet:   regular diet    Labs:  SN to perform labs:  CBC: Weekly; 4 week(s) and BMP: Weekly; 4 week(s) and Report Lab results to PCP.    Referrals/ Consults  Physical Therapy to evaluate and treat. Evaluate for home safety and equipment needs; Establish/upgrade home exercise program. Perform / instruct on therapeutic exercises, gait training, transfer training, and Range of Motion.  Occupational Therapy to evaluate and treat. Evaluate home environment for safety and equipment needs. Perform/Instruct on transfers, ADL training, ROM, and therapeutic exercises.    Activities:   activity as tolerated    Nursing:   Agency to admit patient within 24 hours of hospital discharge unless specified on physician order or at patient request    SN to complete comprehensive assessment including routine vital signs. Instruct on disease process and s/s of complications to report to MD. Review/verify medication list sent home with the patient at time of discharge  and instruct patient/caregiver as needed. Frequency may be adjusted depending on start of care date.     Skilled nurse to perform up to 3 visits PRN for symptoms related to  diagnosis    Notify MD if SBP > 160 or < 90; DBP > 90 or < 50; HR > 120 or < 50; Temp > 101; O2 < 88%    Ok to schedule additional visits based on staff availability and patient request on consecutive days within the home health episode.    When multiple disciplines ordered:    Start of Care occurs on Sunday - Wednesday schedule remaining discipline evaluations as ordered on separate consecutive days following the start of care.    Thursday SOC -schedule subsequent evaluations Friday and Monday the following week.     Friday - Saturday SOC - schedule subsequent discipline evaluations on consecutive days starting Monday of the following week.    For all post-discharge communication and subsequent orders please contact patient's primary care physician.           I certify that this patient is confined to her home and needs intermittent skilled nursing care, physical therapy, and occupational therapy.

## 2022-12-13 NOTE — PLAN OF CARE
Problem: Physical Therapy  Goal: Physical Therapy Goal  Description: Goals to be met by: 12/25/22    Patient will increase functional independence with mobility by performing:    . Supine to sit with Modified [place]-met  . Sit to supine with Modified [place]-met  . Rolling to Left and Right with Modified [place].-met  . Sit to stand transfer with Supervision - Met 12/5  Updated Goal: Sit to stand transfer with Mod I using RW-not met    . Bed to chair transfer with Supervision using Rolling Walker/no AD - Met 12/5  Updated Goal: Bed to chair transfer with Mod I using RW-not met    . Gait  x 150 feet with Supervision using Rolling Walker. -met  . Wheelchair propulsion x150 feet with Modified [place] using bilateral uppper extremities-met  . Ascend/descend 12 stair with bilateral Handrails Supervision using No Assistive Device.   . Ascend/Descend 4 inch curb step with Supervision using Rolling Walker.-met    Outcome: Ongoing, Progressing

## 2022-12-13 NOTE — PLAN OF CARE
Patient is set to discharge on 12/14/22 at 9:00am. Patient discharging to home with a friend via personal car. Patient set up with [facility]. DME provided: 3 in 1 commode; patient declined all the other recommended DME.

## 2022-12-13 NOTE — PT/OT/SLP PROGRESS
Occupational Therapy   Treatment/ Discharge Note    Name: Manisha Wynne  MRN: [id number]  Admit Date: 11/23/2022  Admitting Diagnosis:  Cervical myelopathy    General Precautions: Standard, fall   Orthopedic Precautions: spinal precautions   Braces: Aspen collar    Recommendations:     Discharge Recommendations:  home health OT  Level of Assistance Recommended at Discharge: Intermittent assistance for ADL's and homemaking tasks  Discharge Equipment Recommendations: bedside commode, bath bench, wheelchair, walker, rolling  Barriers to discharge:  None    Assessment:     Manisha Wynne is a 72 y.o. female with a medical diagnosis of Cervical myelopathy.  She presents with limitations in performance of self-care, functional mobility, and ADLs. Performance deficits affecting function are weakness, impaired endurance, impaired functional mobility, impaired self care skills, gait instability, impaired balance, decreased lower extremity function, decreased upper extremity function, decreased safety awareness, pain, orthopedic precautions, impaired cardiopulmonary response to activity. Pt tolerated Tx without incident and is making progress but continues to require assist to perform self care tasks, functional mobility and functional transfers . She would continue to benefit from OT intervention to further her functional (I)ce and safety.    Rehab Potential is good    Activity tolerance:  Good    Plan:     Patient to be seen 5 x/week to address the above listed problems via self-care/home management, therapeutic activities, therapeutic exercises    Plan of Care Expires: 12/24/22  Plan of Care Reviewed with: patient    Subjective     Communicated with: Nurse prior to session.    Pain/Comfort:  Pain Rating 1: 0/10  Pain Rating Post-Intervention 1: 0/10    Patient's cultural, spiritual, [employer]al conflicts given the current situation:  no    Objective:     Patient found HOB elevated with cervical collar upon OT  entry to room.    Bed Mobility:    Patient completed Rolling/Turning to Right with supervision  Patient completed Scooting/Bridging with supervision  Patient completed Supine to Sit with supervision     Functional Mobility/Transfers:  Patient completed Sit <> Stand Transfer with supervision  with  rolling walker   Patient completed Toilet Transfer Step Transfer technique with supervision with  rolling walker with bed side commode positioned over toilet for attempt one. Pt performed toilet transfer without bed side commode positioned over toilet requiring mod A for sit to stand for attempt two.   Functional Mobility: Pt ambulated around room to/from bathroom and then down to gym with RW and sup.     Activities of Daily Living:  Grooming: supervision standing sinkside with RW  Upper Body Dressing: supervision while seated   Lower Body Dressing: supervision with RW to stand to manage pants  Toileting: supervision with RW to stand to manage pants    [facility] 6 Click ADL: 20    Treatment & Education:  Pt educated on role of OT, safety while performing functional transfers, safety while performing self care tasks, orthopedic precautions, importance to adhering to precautions and progress towards OT goals    Patient left  ambulatory in therapy gym  with  PT present    GOALS:   Multidisciplinary Problems       Occupational Therapy Goals          Problem: Occupational Therapy    Goal Priority Disciplines Outcome Interventions   Occupational Therapy Goal     OT, PT/OT Ongoing, Progressing    Description: Goals to be met by: 12/24/22     Patient will increase functional independence with ADLs by performing:    UE Dressing with Modified [place]. -MET   LE Dressing with Modified [place]. - MET  Grooming while standing at sink with Modified [place]. -MET  Toileting from toilet with Modified [place] for hygiene and clothing management. -MET  Bathing from  shower chair/bench with Supervision. -MET  Step transfer  with Modified [place] - MET  Upper extremity exercise program with supervision. -MET  Caregiver will be educated on level of assistance required to safely perform self care and functional transfers. -MET                        Time Tracking:     OT Date of Treatment: 12/13/22  OT Start Time: 0936    OT Stop Time: 1006  OT Total Time (min): 30 min    Billable Minutes:Self Care/Home Management 30    12/13/2022

## 2022-12-14 VITALS
BODY MASS INDEX: 24.52 KG/M2 | HEIGHT: 68 IN | WEIGHT: 161.81 LBS | HEART RATE: 60 BPM | DIASTOLIC BLOOD PRESSURE: 55 MMHG | SYSTOLIC BLOOD PRESSURE: 110 MMHG | OXYGEN SATURATION: 98 % | TEMPERATURE: 98 F | RESPIRATION RATE: 18 BRPM

## 2022-12-14 PROCEDURE — 25000242 PHARM REV CODE 250 ALT 637 W/ HCPCS: Performed by: HOSPITALIST

## 2022-12-14 PROCEDURE — 25000003 PHARM REV CODE 250: Performed by: HOSPITALIST

## 2022-12-14 RX ADMIN — LOSARTAN POTASSIUM 25 MG: 25 TABLET, FILM COATED ORAL at 09:12

## 2022-12-14 RX ADMIN — ACETAMINOPHEN 1000 MG: 500 TABLET ORAL at 05:12

## 2022-12-14 RX ADMIN — BUPROPION HYDROCHLORIDE 300 MG: 300 TABLET, FILM COATED, EXTENDED RELEASE ORAL at 09:12

## 2022-12-14 RX ADMIN — GABAPENTIN 100 MG: 100 CAPSULE ORAL at 09:12

## 2022-12-14 RX ADMIN — Medication 70 MG: at 09:12

## 2022-12-14 RX ADMIN — METOPROLOL TARTRATE 25 MG: 25 TABLET, FILM COATED ORAL at 09:12

## 2022-12-14 RX ADMIN — FLUOXETINE 40 MG: 10 CAPSULE ORAL at 09:12

## 2022-12-14 NOTE — PLAN OF CARE
Problem: Adult Inpatient Plan of Care  Goal: Plan of Care Review  Outcome: Met  Goal: Patient-Specific Goal (Individualized)  Outcome: Met  Goal: Absence of Hospital-Acquired Illness or Injury  Outcome: Met  Goal: Optimal Comfort and Wellbeing  Outcome: Met  Goal: Readiness for Transition of Care  Outcome: Met     Problem: Skin Injury Risk Increased  Goal: Skin Health and Integrity  Outcome: Met     Problem: Fall Injury Risk  Goal: Absence of Fall and Fall-Related Injury  Outcome: Met     Problem: Physical Therapy  Goal: Physical Therapy Goal  Description: Goals to be met by: 12/25/22    Patient will increase functional independence with mobility by performing:    . Supine to sit with Modified [place]-met  . Sit to supine with Modified [place]-met  . Rolling to Left and Right with Modified [place].-met  . Sit to stand transfer with Supervision - Met 12/5  Updated Goal: Sit to stand transfer with Mod I using RW-not met    . Bed to chair transfer with Supervision using Rolling Walker/no AD - Met 12/5  Updated Goal: Bed to chair transfer with Mod I using RW-not met    . Gait  x 150 feet with Supervision using Rolling Walker. -met  . Wheelchair propulsion x150 feet with Modified [place] using bilateral uppper extremities-met  . Ascend/descend 12 stair with bilateral Handrails Supervision using No Assistive Device.   . Ascend/Descend 4 inch curb step with Supervision using Rolling Walker.-met    Outcome: Met     Problem: Occupational Therapy  Goal: Occupational Therapy Goal  Description: Goals to be met by: 12/24/22     Patient will increase functional independence with ADLs by performing:    UE Dressing with Modified [place].  LE Dressing with Modified [place].  Grooming while standing at sink with Modified [place].  Toileting from toilet with Modified [place] for hygiene and clothing management.   Bathing from  shower chair/bench with Supervision.  Step transfer with Modified  [place]  Upper extremity exercise program with supervision.  Caregiver will be educated on level of assistance required to safely perform self care and functional transfers.    Outcome: Met

## 2022-12-14 NOTE — DISCHARGE SUMMARY
[facility]   Skilled Nursing Facility   Discharge Summary  Patient Name: Manisha Wynne  : 1950  MRN: [id number]  Attending Physician: Jamil Luther MD  Nurse Practitioner: Angely De La Rosa NP    Admit Date: 2022   Date of Discharge:    Length of Stay:  LOS: 21 days     Date of Service: 2022    Principal Problem: Cervical myelopathy    HPI  Manisha Wynne is a 72 y.o. female with PMH of cervical stenosis, myelopathy, and hypertension. She has an MRI-non-compatible pacemaker in place. s/p C3-C6 posterior cervical fusion on  by Dr. Cheek.      Patient will be treated at [facility] with PT and OT to improve functional status and ability to perform ADLs    Hospital Course  Patient progressed well with PT and OT- last PT note states that patient ambulated 200 ft x 2 to/from gym/room no LOB to include curb step and steps. Patient had no significant events during their stay at [facility]. Home health was set up. DME was ordered if needed. Follow up appointment to be made by patient within one week. All prescriptions and discharge instructions were ordered to be given to the patient prior to discharge.     Physical Exam  Vitals and nursing note reviewed.   Constitutional:       General: She is not in acute distress.     Appearance: Normal appearance. She is not ill-appearing.   HENT:      Head: Normocephalic and atraumatic.      Nose: Nose normal. No congestion.      Mouth/Throat:      Mouth: Mucous membranes are moist.      Pharynx: Oropharynx is clear.   Eyes:      Extraocular Movements: Extraocular movements intact.      Conjunctiva/sclera: Conjunctivae normal.      Pupils: Pupils are equal, round, and reactive to light.   Neck:      Comments: C-collar in place     Cardiovascular:      Rate and Rhythm: Normal rate and regular rhythm.      Pulses: Normal pulses.      Heart sounds: Normal heart sounds. No murmur heard.  Pulmonary:      Effort: Pulmonary effort is normal.  No respiratory distress.      Breath sounds: Normal breath sounds. No wheezing or rhonchi.   Abdominal:      General: Bowel sounds are normal. There is no distension.      Palpations: Abdomen is soft. There is no mass.      Tenderness: There is no abdominal tenderness.   Musculoskeletal:         General: No swelling or tenderness.      Cervical back: Normal range of motion and neck supple. No tenderness.      Right lower leg: No edema.      Left lower leg: No edema.   Skin:     General: Skin is warm and dry.      Capillary Refill: Capillary refill takes less than 2 seconds.      Findings: No erythema or rash.   Neurological:      Mental Status: She is alert and oriented to person, place, and time. Mental status is at baseline.      Motor: Weakness present.   Psychiatric:         Mood and Affect: Mood normal.         Behavior: Behavior normal.         Thought Content: Thought content normal.         Judgment: Judgment normal.     Recent Labs   Lab 12/08/22  0405 12/12/22  0557   WBC 5.33 4.89   HGB 9.1* 9.3*   HCT 29.2* 28.4*    131*     Recent Labs   Lab 12/08/22 0405 12/12/22  0557    139   K 4.5 4.2    107   CO2 27 24   BUN 17 17   CREATININE 0.9 0.8   GLU 79 93   CALCIUM 9.1 9.3   MG 2.0 1.9   PHOS 4.6* 4.4     No results for input(s): ALKPHOS, ALT, AST, ALBUMIN, PROT, BILITOT, INR in the last 168 hours.   No results for input(s): CPK, CPKMB, MB, TROPONINI in the last 72 hours.  No results for input(s): LACTATE in the last 72 hours.    No results for input(s): POCTGLUCOSE in the last 168 hours.   Hemoglobin A1C   Date Value Ref Range Status   08/05/2022 5.3 4.0 - 5.6 % Final     Comment:     ADA Screening Guidelines:  5.7-6.4%  Consistent with prediabetes  >or=6.5%  Consistent with diabetes    High levels of fetal hemoglobin interfere with the HbA1C  assay. Heterozygous hemoglobin variants (HbS, HgC, etc)do  not significantly interfere with this assay.   However, presence of multiple variants  may affect accuracy.     06/10/2021 5.4 4.0 - 5.6 % Final     Comment:     ADA Screening Guidelines:  5.7-6.4%  Consistent with prediabetes  >or=6.5%  Consistent with diabetes    High levels of fetal hemoglobin interfere with the HbA1C  assay. Heterozygous hemoglobin variants (HbS, HgC, etc)do  not significantly interfere with this assay.   However, presence of multiple variants may affect accuracy.     06/10/2021 5.4 4.0 - 5.6 % Final     Comment:     ADA Screening Guidelines:  5.7-6.4%  Consistent with prediabetes  >or=6.5%  Consistent with diabetes    High levels of fetal hemoglobin interfere with the HbA1C  assay. Heterozygous hemoglobin variants (HbS, HgC, etc)do  not significantly interfere with this assay.   However, presence of multiple variants may affect accuracy.           Current Discharge Medication List        START taking these medications    Details   methyl salicylate-menthol 15-10% 15-10 % Crea Apply topically every evening.  Qty: 113 g, Refills: 0      senna-docusate 8.6-50 mg (PERICOLACE) 8.6-50 mg per tablet Take 1 tablet by mouth 2 (two) times daily as needed for Constipation.  Qty: 60 tablet, Refills: 0           CONTINUE these medications which have CHANGED    Details   buPROPion (WELLBUTRIN XL) 300 MG 24 hr tablet Take 1 tablet (300 mg total) by mouth once daily.  Qty: 30 tablet, Refills: 0      calcium-vitamin D tablet 600 mg-200 units Take 1 tablet by mouth once daily.  Qty: 30 tablet, Refills: 0      cholecalciferol, vitamin D3, 125 mcg (5,000 unit) Tab Take 1 tablet (5,000 Units total) by mouth once daily.  Qty: 30 tablet, Refills: 0      dicyclomine (BENTYL) 20 mg tablet Take 1 tablet (20 mg total) by mouth every 6 (six) hours. Takes prn for 15 days  Qty: 60 tablet, Refills: 0      FLUoxetine 40 MG capsule Take 1 capsule (40 mg total) by mouth once daily.  Qty: 30 capsule, Refills: 0      gabapentin (NEURONTIN) 100 MG capsule Take 1 capsule (100 mg total) by mouth 3 (three) times  daily.  Qty: 90 capsule, Refills: 0      oxyCODONE (ROXICODONE) 10 mg Tab immediate release tablet Take 1 tablet (10 mg total) by mouth every 6 (six) hours as needed for Pain.  Qty: 21 tablet, Refills: 0    Comments: Quantity prescribed more than 7 day supply? No           CONTINUE these medications which have NOT CHANGED    Details   acetaminophen (TYLENOL) 500 MG tablet Take 2 tablets (1,000 mg total) by mouth every 8 (eight) hours.  Refills: 0      losartan (COZAAR) 25 MG tablet TAKE 1 TABLET EVERY DAY  Qty: 90 tablet, Refills: 6      metoprolol tartrate (LOPRESSOR) 25 MG tablet TAKE 1 TABLET TWICE DAILY  Qty: 180 tablet, Refills: 3    Associated Diagnoses: Hypertension, essential      pravastatin (PRAVACHOL) 20 MG tablet TAKE 1 TABLET EVERY DAY  Qty: 90 tablet, Refills: 3    Associated Diagnoses: Mixed hyperlipidemia           STOP taking these medications       methocarbamoL 1,000 mg Tab Comments:   Reason for Stopping:         lisdexamfetamine (VYVANSE) 70 MG capsule Comments:   Reason for Stopping:           Assessment and Plan:     Cervical myelopathy  cervical myelopathy s/p C3-C6 posterior cervical fusion on 11/14 by Dr. Cheek    - C collar to be worn when upright and OOB  - Pain control- Initiate order to increase oxycodone to 15 q 4 hours prn severe pain. Increased gabapentin to 200 mg tid. Continue scheduled robaxin and tylenol.   - Bowel regimen: senna BID and miralax daily. 11/28 - -Atelectasis prevention: IS hourly while awake, PT/OT, OOB > 6 hours per day  -DVT PPx- Lovenox 40mg daily     Hypertension   continue home meds. Metoprolol 25mg, Losartan 25mg       Discharge Diet:regular diet with Normal Fluid intake of 1500 - 2000 mL per day    Activity: activity as tolerated    Discharge Condition: Good     Disposition: Home-Health Care [facility]    Future Appointments   Date Time Provider Department Center   12/21/2022 10:00 AM Diego Forutne MD [facility]   12/22/2022 10:00 AM [facility] OIHELEN-XRAY [facility]  XRAY [facility]   12/22/2022 11:00 AM Nicolette Cheek MD [facility] NEUROS8 [facility]   1/5/2023 10:00 AM HOME MONITOR DEVICE CHECK, NOM NOM ARRHPRO [facility]   1/10/2023  8:45 AM EKG, APPT [facility] EKG [facility]   1/10/2023  9:00 AM COORDINATED DEVICE CHECK [facility] ARRHPRO [facility]   1/10/2023  9:30 AM Svetlana Miner NP [facility] ARRHYTH [facility]   2/2/2023 11:30 AM Sunitha Rosario PA-C [facility] NEURO8 [facility]   2/10/2023 10:00 AM LAB, APPOINTMENT [facility] INTMED [facility] LAB IM [facility]   2/23/2023  9:15 AM INJECTION [facility] AMB INF [facility]       35 minutes total time spent on the discharge of the patient including review of hospital course with the patient, reviewing discharge medications, and arranging follow-up care.      Angely De La Rosa NP  [facility]   Skilled Nursing [facility]

## 2022-12-14 NOTE — NURSING
Discharge instructions reviewed with patient. Patient verbalize understanding. Answered all patients questions and concerns. Skin intact. Discharged at 1050 with a friend via private car, belonging sent and patient condition stable.

## 2022-12-15 PROCEDURE — G0180 MD CERTIFICATION HHA PATIENT: HCPCS | Mod: ,,, | Performed by: HOSPITALIST

## 2022-12-15 PROCEDURE — G0180 PR HOME HEALTH MD CERTIFICATION: ICD-10-PCS | Mod: ,,, | Performed by: HOSPITALIST

## 2022-12-22 ENCOUNTER — HOSPITAL ENCOUNTER (OUTPATIENT)
Dept: RADIOLOGY | Facility: HOSPITAL | Age: 72
Discharge: HOME | End: 2022-12-22
Attending: NEUROLOGICAL SURGERY
Payer: MEDICARE

## 2022-12-22 ENCOUNTER — PATIENT MESSAGE (OUTPATIENT)
Dept: ELECTROPHYSIOLOGY | Facility: CLINIC | Age: 72
End: 2022-12-22
Payer: MEDICARE

## 2022-12-22 ENCOUNTER — OFFICE VISIT (OUTPATIENT)
Dept: NEUROSURGERY | Facility: CLINIC | Age: 72
End: 2022-12-22
Payer: MEDICARE

## 2022-12-22 VITALS — SYSTOLIC BLOOD PRESSURE: 131 MMHG | DIASTOLIC BLOOD PRESSURE: 64 MMHG | HEART RATE: 59 BPM | TEMPERATURE: 98 F

## 2022-12-22 DIAGNOSIS — M47.12 CERVICAL SPONDYLOSIS WITH MYELOPATHY: ICD-10-CM

## 2022-12-22 DIAGNOSIS — M47.12 CERVICAL SPONDYLOSIS WITH MYELOPATHY: Primary | ICD-10-CM

## 2022-12-22 PROCEDURE — 3044F HG A1C LEVEL LT 7.0%: CPT | Mod: HCNC,CPTII,S$GLB, | Performed by: NEUROLOGICAL SURGERY

## 2022-12-22 PROCEDURE — 4010F ACE/ARB THERAPY RXD/TAKEN: CPT | Mod: HCNC,CPTII,S$GLB, | Performed by: NEUROLOGICAL SURGERY

## 2022-12-22 PROCEDURE — 3288F FALL RISK ASSESSMENT DOCD: CPT | Mod: HCNC,CPTII,S$GLB, | Performed by: NEUROLOGICAL SURGERY

## 2022-12-22 PROCEDURE — 3075F PR MOST RECENT SYSTOLIC BLOOD PRESS GE 130-139MM HG: ICD-10-PCS | Mod: HCNC,CPTII,S$GLB, | Performed by: NEUROLOGICAL SURGERY

## 2022-12-22 PROCEDURE — 1157F ADVNC CARE PLAN IN RCRD: CPT | Mod: HCNC,CPTII,S$GLB, | Performed by: NEUROLOGICAL SURGERY

## 2022-12-22 PROCEDURE — 1125F PR PAIN SEVERITY QUANTIFIED, PAIN PRESENT: ICD-10-PCS | Mod: HCNC,CPTII,S$GLB, | Performed by: NEUROLOGICAL SURGERY

## 2022-12-22 PROCEDURE — 3078F PR MOST RECENT DIASTOLIC BLOOD PRESSURE < 80 MM HG: ICD-10-PCS | Mod: HCNC,CPTII,S$GLB, | Performed by: NEUROLOGICAL SURGERY

## 2022-12-22 PROCEDURE — 1157F PR ADVANCE CARE PLAN OR EQUIV PRESENT IN MEDICAL RECORD: ICD-10-PCS | Mod: HCNC,CPTII,S$GLB, | Performed by: NEUROLOGICAL SURGERY

## 2022-12-22 PROCEDURE — 1101F PR PT FALLS ASSESS DOC 0-1 FALLS W/OUT INJ PAST YR: ICD-10-PCS | Mod: HCNC,CPTII,S$GLB, | Performed by: NEUROLOGICAL SURGERY

## 2022-12-22 PROCEDURE — 1101F PT FALLS ASSESS-DOCD LE1/YR: CPT | Mod: HCNC,CPTII,S$GLB, | Performed by: NEUROLOGICAL SURGERY

## 2022-12-22 PROCEDURE — 3078F DIAST BP <80 MM HG: CPT | Mod: HCNC,CPTII,S$GLB, | Performed by: NEUROLOGICAL SURGERY

## 2022-12-22 PROCEDURE — 1159F PR MEDICATION LIST DOCUMENTED IN MEDICAL RECORD: ICD-10-PCS | Mod: HCNC,CPTII,S$GLB, | Performed by: NEUROLOGICAL SURGERY

## 2022-12-22 PROCEDURE — 3044F PR MOST RECENT HEMOGLOBIN A1C LEVEL <7.0%: ICD-10-PCS | Mod: HCNC,CPTII,S$GLB, | Performed by: NEUROLOGICAL SURGERY

## 2022-12-22 PROCEDURE — 4010F PR ACE/ARB THEARPY RXD/TAKEN: ICD-10-PCS | Mod: HCNC,CPTII,S$GLB, | Performed by: NEUROLOGICAL SURGERY

## 2022-12-22 PROCEDURE — 99024 PR POST-OP FOLLOW-UP VISIT: ICD-10-PCS | Mod: HCNC,S$GLB,, | Performed by: NEUROLOGICAL SURGERY

## 2022-12-22 PROCEDURE — 72040 X-RAY EXAM NECK SPINE 2-3 VW: CPT | Mod: 26,HCNC,, | Performed by: RADIOLOGY

## 2022-12-22 PROCEDURE — 99999 PR PBB SHADOW E&M-EST. PATIENT-LVL III: ICD-10-PCS | Mod: PBBFAC,HCNC,, | Performed by: NEUROLOGICAL SURGERY

## 2022-12-22 PROCEDURE — 3075F SYST BP GE 130 - 139MM HG: CPT | Mod: HCNC,CPTII,S$GLB, | Performed by: NEUROLOGICAL SURGERY

## 2022-12-22 PROCEDURE — 72040 X-RAY EXAM NECK SPINE 2-3 VW: CPT | Mod: TC,HCNC

## 2022-12-22 PROCEDURE — 99999 PR PBB SHADOW E&M-EST. PATIENT-LVL III: CPT | Mod: PBBFAC,HCNC,, | Performed by: NEUROLOGICAL SURGERY

## 2022-12-22 PROCEDURE — 72040 XR CERVICAL SPINE AP LATERAL: ICD-10-PCS | Mod: 26,HCNC,, | Performed by: RADIOLOGY

## 2022-12-22 PROCEDURE — 99024 POSTOP FOLLOW-UP VISIT: CPT | Mod: HCNC,S$GLB,, | Performed by: NEUROLOGICAL SURGERY

## 2022-12-22 PROCEDURE — 1125F AMNT PAIN NOTED PAIN PRSNT: CPT | Mod: HCNC,CPTII,S$GLB, | Performed by: NEUROLOGICAL SURGERY

## 2022-12-22 PROCEDURE — 1159F MED LIST DOCD IN RCRD: CPT | Mod: HCNC,CPTII,S$GLB, | Performed by: NEUROLOGICAL SURGERY

## 2022-12-22 PROCEDURE — 3288F PR FALLS RISK ASSESSMENT DOCUMENTED: ICD-10-PCS | Mod: HCNC,CPTII,S$GLB, | Performed by: NEUROLOGICAL SURGERY

## 2022-12-22 RX ORDER — GABAPENTIN 100 MG/1
100 CAPSULE ORAL 3 TIMES DAILY
Qty: 270 CAPSULE | Refills: 0 | OUTPATIENT
Start: 2022-12-22 | End: 2023-03-22

## 2022-12-23 ENCOUNTER — OFFICE VISIT (OUTPATIENT)
Dept: INTERNAL MEDICINE | Facility: CLINIC | Age: 72
End: 2022-12-23
Payer: MEDICARE

## 2022-12-23 DIAGNOSIS — F98.8 ATTENTION DEFICIT DISORDER, UNSPECIFIED HYPERACTIVITY PRESENCE: Primary | ICD-10-CM

## 2022-12-23 DIAGNOSIS — I77.89 OTHER SPECIFIED DISORDERS OF ARTERIES AND ARTERIOLES: ICD-10-CM

## 2022-12-23 PROCEDURE — 4010F PR ACE/ARB THEARPY RXD/TAKEN: ICD-10-PCS | Mod: HCNC,CPTII,95, | Performed by: INTERNAL MEDICINE

## 2022-12-23 PROCEDURE — 1157F ADVNC CARE PLAN IN RCRD: CPT | Mod: HCNC,CPTII,95, | Performed by: INTERNAL MEDICINE

## 2022-12-23 PROCEDURE — 99214 OFFICE O/P EST MOD 30 MIN: CPT | Mod: HCNC,95,, | Performed by: INTERNAL MEDICINE

## 2022-12-23 PROCEDURE — 3044F HG A1C LEVEL LT 7.0%: CPT | Mod: HCNC,CPTII,95, | Performed by: INTERNAL MEDICINE

## 2022-12-23 PROCEDURE — 99214 PR OFFICE/OUTPT VISIT, EST, LEVL IV, 30-39 MIN: ICD-10-PCS | Mod: HCNC,95,, | Performed by: INTERNAL MEDICINE

## 2022-12-23 PROCEDURE — 4010F ACE/ARB THERAPY RXD/TAKEN: CPT | Mod: HCNC,CPTII,95, | Performed by: INTERNAL MEDICINE

## 2022-12-23 PROCEDURE — 1157F PR ADVANCE CARE PLAN OR EQUIV PRESENT IN MEDICAL RECORD: ICD-10-PCS | Mod: HCNC,CPTII,95, | Performed by: INTERNAL MEDICINE

## 2022-12-23 PROCEDURE — 3044F PR MOST RECENT HEMOGLOBIN A1C LEVEL <7.0%: ICD-10-PCS | Mod: HCNC,CPTII,95, | Performed by: INTERNAL MEDICINE

## 2022-12-23 RX ORDER — GABAPENTIN 100 MG/1
100 CAPSULE ORAL 3 TIMES DAILY
Qty: 270 CAPSULE | Refills: 1 | Status: SHIPPED | OUTPATIENT
Start: 2022-12-23 | End: 2023-01-22

## 2022-12-23 RX ORDER — LISDEXAMFETAMINE DIMESYLATE 70 MG/1
70 CAPSULE ORAL EVERY MORNING
Qty: 30 CAPSULE | Refills: 0 | Status: ON HOLD
Start: 2022-12-23 | End: 2024-02-25 | Stop reason: HOSPADM

## 2022-12-23 NOTE — PROGRESS NOTES
Subjective:       Patient ID: Manisha Wynne is a 72 y.o. female.    Chief Complaint: Hospital follow up    HPI The patient location is: Home, [place]  The chief complaint leading to consultation is: Hospital follow up after cervical spine surgery    Visit type: audiovisual    Face to Face time with patient: 22   minutes of total time spent on the encounter, which includes face to face time and non-face to face time preparing to see the patient (eg, review of tests), Obtaining and/or reviewing separately obtained history, Documenting clinical information in the electronic or other health record, Independently interpreting results (not separately reported) and communicating results to the patient/family/caregiver, or Care coordination (not separately reported).         Each patient to whom he or she provides medical services by telemedicine is:  (1) informed of the relationship between the physician and patient and the respective role of any other health care provider with respect to management of the patient; and (2) notified that he or she may decline to receive medical services by telemedicine and may withdraw from such care at any time.    Notes:    Pt is doing well - had cervical spine surgery last month then was in rehab for about one month - she is now home with the help of friends and home health.  No CP or SOB. Surgery went smoothly.    Review of Systems   Constitutional:  Positive for activity change. Negative for unexpected weight change.   HENT:  Negative for hearing loss, rhinorrhea and trouble swallowing.    Eyes:  Negative for discharge and visual disturbance.   Respiratory:  Negative for chest tightness, shortness of breath (PND or orthopnea) and wheezing.    Cardiovascular:  Negative for chest pain and palpitations.   Gastrointestinal:  Negative for abdominal pain, blood in stool, constipation, diarrhea, nausea and vomiting.   Endocrine: Negative for polydipsia and polyuria.   Genitourinary:   Negative for difficulty urinating, dysuria, hematuria and menstrual problem.   Musculoskeletal:  Positive for neck pain. Negative for arthralgias and joint swelling.   Neurological:  Negative for seizures, syncope, weakness and headaches.   Psychiatric/Behavioral:  Negative for confusion and dysphoric mood.      Objective:      Physical Exam  Constitutional:       General: She is not in acute distress.     Appearance: She is not ill-appearing or toxic-appearing.      Comments: Pt is in her bed but moves freely and is conversant and pleasant no pain or restricted neck movement.    Neurological:      Mental Status: She is alert.       Assessment:       1. Attention deficit disorder, unspecified hyperactivity presence          Plan:   Attention deficit disorder, unspecified hyperactivity presence  -     lisdexamfetamine (VYVANSE) 70 MG capsule; Take 1 capsule (70 mg total) by mouth every morning.  Dispense: 30 capsule; Refill: 0    Other orders  -     gabapentin (NEURONTIN) 100 MG capsule; Take 1 capsule (100 mg total) by mouth 3 (three) times daily.  Dispense: 270 capsule; Refill: 1      Disorders of arteries/arterioles/atherosclerosis  On statin    Transitional Care Note    Family and/or Caretaker present at visit?  No.  Diagnostic tests reviewed/disposition: I have reviewed all completed as well as pending diagnostic tests at the time of discharge.  Disease/illness education: Yes    Home health/community services discussion/referrals: Patient has home health established at [facility] .   Establishment or re-establishment of referral orders for community resources: No other necessary community resources.   Discussion with other health care providers: No discussion with other health care providers necessary.

## 2022-12-23 NOTE — PROGRESS NOTES
"  Neurosurgery  Follow up     SUBJECTIVE:     Chief Complaint: "here for neck evaluation"     History of Present Illness:  Manisha Wynne is a 72 y.o. female (former  at [employer] [place] who presents as a referral from Dr. Mcduffie for evaluation of cervical myelopathy. The patient reports that she has had several falls from [id number], which prompted her to seek neurologic evaluation. The falls have resulted in injuries, including breaking her wrist. She also has numbness, tingling, and burning in her feet. She denies pain in her neck, but endorses in her mid- and low back. Her pain is 10/10 in the legs, 10/10 in the back, and 0/10 in the neck and arms. The pain is intermittent. It is made worse by sitting. It improves with standing. She denies any change in bowel/bladder habit.     She endorses handwriting changes, dropping objects, and balance/gait difficulty. She denies fine motor changes. She has not seen pain management for her neck, but she is seeing Dr. Booker for "sciatica."     CT of the cervical spine (the patient has a non-MRI-conditional compatible pacemaker in place) was obtained and personally reviewed, demonstrating significant stenosis at C5-C6>C4-C5. Flex ex shows some mild instability of C3 on C4.     She lives alone with her tuxedo cats. Her family is in [place] and [facility]. She does have friends nearby who will help to care for her in perioperative periods. She takes ASA 81 daily.     As of 5/6/21, the patient had the myelogram as requested. This is personally reviewed and demonstrates significant cord compression and OPLL. There is some stenosis at L4-L5 as well.     The patient reports that she has been well. She has had no falls. She has been doing PT for her sciatica, which she has found helpful. She does note that her tremor, which may be progressing slightly, is now interfering with her applying makeup at times.     As of 5/25/21, the patient reports that she has not had any significant " changes. She presents today with a list of questions, together with friend Kavya Roberts on the phone. She also endorses a several year history of urinary incontinence (episodes occur overnight or with coughing) for which she was going to participate in pelvic floor rehab and see urogyn; however, this was interrupted by Covid.     As of 1/11/22, the patient reports that she has had ongoing C. Diff since we scheduled surgery. She was seen about a potential colonoscopy today, but was advised against proceeding. She is due for a CT of the abdomen/pelvis next week to look at the pancreas. She is also being considered for a fecal transplant. There is also a clinical trial going on in which she's interested.     Dr. Maradiaga of ID has been quarterbacking the C. Diff response. The patient is frustrated by her lack of improvement.     She is less fatigued now than when she initially became sick, but she does not feel she has yet recovered to her baseline. She is also being recommended for cataract surgery and pacemaker generator replacement in about 13 months.     As of 8/4/22, the patient reports that she is finally over her C. Diff. 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 She notes that she has been really careful. She walks very slowly. She has noticed some tremor in her hands at rest. This gets better when she holds onto an object. She denies anosmia. She denies significant sleep difficulties and other non-motor symptoms of PD.     As of 12/22/22, the patient underwent C3-C6 posterior cervical decompression and fusion on 11/14/22. She reports that she has been doing well since surgery. She has NOT been using a bone growth stimulator. She denies fever, chills, or drainage from the incision. She has no complaints of C. Diff. She is eager to return to work in the 's office.      Review of patient's allergies indicates:  No Known Allergies    Current Outpatient Medications   Medication Sig  Dispense Refill    acetaminophen (TYLENOL) 500 MG tablet Take 2 tablets (1,000 mg total) by mouth every 8 (eight) hours.  0    buPROPion (WELLBUTRIN XL) 300 MG 24 hr tablet Take 1 tablet (300 mg total) by mouth once daily. 30 tablet 0    calcium-vitamin D tablet 600 mg-200 units Take 1 tablet by mouth once daily. 30 tablet 0    cholecalciferol, vitamin D3, 125 mcg (5,000 unit) Tab Take 1 tablet (5,000 Units total) by mouth once daily. 30 tablet 0    dicyclomine (BENTYL) 20 mg tablet Take 1 tablet (20 mg total) by mouth every 6 (six) hours. Takes prn for 15 days 60 tablet 0    FLUoxetine 40 MG capsule Take 1 capsule (40 mg total) by mouth once daily. 30 capsule 0    gabapentin (NEURONTIN) 100 MG capsule Take 1 capsule (100 mg total) by mouth 3 (three) times daily. 90 capsule 0    losartan (COZAAR) 25 MG tablet TAKE 1 TABLET EVERY DAY 90 tablet 6    methyl salicylate-menthol 15-10% 15-10 % Crea Apply topically every evening. 113 g 0    metoprolol tartrate (LOPRESSOR) 25 MG tablet TAKE 1 TABLET TWICE DAILY 180 tablet 3    pravastatin (PRAVACHOL) 20 MG tablet TAKE 1 TABLET EVERY DAY 90 tablet 3    senna-docusate 8.6-50 mg (PERICOLACE) 8.6-50 mg per tablet Take 1 tablet by mouth 2 (two) times daily as needed for Constipation. 60 tablet 0     No current facility-administered medications for this visit.       Past Medical History:   Diagnosis Date    Abnormal Pap smear     Atrial fibrillation     AV block, 1st degree 07/25/2012    C. difficile diarrhea     RESOLVED    Cataract     Depression 07/24/2012    Facet arthritis of lumbar region 03/31/2015    Falls     3 falls in the last 6 mos--noted 6/19/19    Hyperlipidemia     Hypertension 07/24/2012    Neuropathy     Other specified cardiac dysrhythmias(427.89)     Sleep apnea     Syncope 07/24/2012    Tremors of nervous system     hands bilaterally     Past Surgical History:   Procedure Laterality Date    APPLICATION OF CARTILAGE GRAFT Bilateral 12/19/2019    Procedure:  APPLICATION, CARTILAGE GRAFT AURICULAR JEZ;  Surgeon: Martinez Flores III, MD;  Location: [facility];  Service: ENT;  Laterality: Bilateral;    CARDIAC PACEMAKER PLACEMENT  09/07/2012    [id number]    CATARACT EXTRACTION W/  INTRAOCULAR LENS IMPLANT Right 5/16/2022    Procedure: EXTRACTION, CATARACT, WITH IOL INSERTION;  Surgeon: Ines Aguilera MD;  Location: [facility];  Service: Ophthalmology;  Laterality: Right;  Catalys     CATARACT EXTRACTION W/  INTRAOCULAR LENS IMPLANT Left 6/20/2022    Procedure: EXTRACTION, CATARACT, WITH IOL INSERTION;  Surgeon: Ines Aguilera MD;  Location: [facility];  Service: Ophthalmology;  Laterality: Left;  Catalys     DILATION AND CURETTAGE OF UTERUS      Hx of precancerous cells?    ENDOSCOPIC ULTRASOUND OF UPPER GASTROINTESTINAL TRACT N/A 7/5/2019    Procedure: ULTRASOUND, UPPER GI TRACT, ENDOSCOPIC;  Surgeon: Kev Calderon MD;  Location: [facility] (2ND FLR);  Service: Endoscopy;  Laterality: N/A;  Pacemaker-Adam   appt confirmed-rb    MYELOGRAPHY N/A 5/4/2021    Procedure: Myelogram  CERVICAL, THORACIC AND LUMBAR;  Surgeon: [facility] Diagnostic Provider;  Location: [facility] 2ND FLR;  Service: Radiology;  Laterality: N/A;    NASAL SEPTOPLASTY N/A 12/19/2019    Procedure: SEPTOPLASTY, NOSE;  Surgeon: Martinez Flores III, MD;  Location: [facility];  Service: ENT;  Laterality: N/A;  FOLLOW DR SALVATORE KIMBROUGH PROTOCOL    OPEN REDUCTION AND INTERNAL FIXATION (ORIF) OF FRACTURE OF DISTAL RADIUS Left 1/24/2020    Procedure: ORIF, FRACTURE, RADIUS, DISTAL-left;  Surgeon: Ellen Moe MD;  Location: [facility];  Service: Orthopedics;  Laterality: Left;    POSTERIOR FUSION OF CERVICAL SPINE WITH LAMINECTOMY N/A 11/14/2022    Procedure: LAMINECTOMY, SPINE, CERVICAL, WITH POSTERIOR FUSION C3-C6;  Surgeon: Nicolette Cheek MD;  Location: [facility] OR 2ND FLR;  Service: Neurosurgery;  Laterality: N/A;  TORONTO III, ASA III, BLOOD TYPE AND SCREEN, NEUROMONITORING EMG SEP MEP, BRACE/HALO [place],  BED REGULAR BED, HEADREST [place], POSITION PRONE, SPECIAL EQUIPMENT NIKI MIDDLE[place], RADIOLOGY C-ARM, EXT. BONE STIMULATOR BIOMET    REPLACEMENT OF PACEMAKER GENERATOR Left 10/7/2022    Procedure: REPLACEMENT, PACEMAKER GENERATOR;  Surgeon: John Sheets MD;  Location: [facility] EP LAB;  Service: Cardiology;  Laterality: Left;  [facility], SSS, AVB, Dual PPM Gen Change,SJM, MAC ,IA, 3 prep,** Adam dcPPM in situ**    SURGICAL REMOVAL OF NASAL TURBINATE Bilateral 2019    Procedure: EXCISION, NASAL TURBINATE;  Surgeon: Martinez Flores III, MD;  Location: [facility] OR;  Service: ENT;  Laterality: Bilateral;    TONSILLECTOMY      WISDOM TOOTH EXTRACTION       Family History    None       Social History     Socioeconomic History    Marital status:    Occupational History    Occupation: /Rabbi     Employer: [employer]   Tobacco Use    Smoking status: Former     Packs/day: 0.25     Years: 15.00     Pack years: 3.75     Types: Cigarettes     Quit date: 1982     Years since quittin.4    Smokeless tobacco: Former   Substance and Sexual Activity    Alcohol use: Yes     Comment: no daily or heavy use, ~4 times per month    Drug use: No    Sexual activity: Not Currently     Partners: Male     Social Determinants of Health     Financial Resource Strain: Low Risk     Difficulty of Paying Living Expenses: Not very hard   Food Insecurity: No Food Insecurity    Worried About Running Out of Food in the Last Year: Never true    Ran Out of Food in the Last Year: Never true   Transportation Needs: No Transportation Needs    Lack of Transportation (Medical): No    Lack of Transportation (Non-Medical): No   Physical Activity: Insufficiently Active    Days of Exercise per Week: 1 day    Minutes of Exercise per Session: 60 min   Stress: No Stress Concern Present    Feeling of Stress : Only a little   Social Connections: Unknown    Frequency of Communication with Friends and Family: More than three  times a week    Frequency of Social Gatherings with Friends and Family: Once a week    Active Member of Clubs or Organizations: Yes    Attends Club or Organization Meetings: More than 4 times per year    Marital Status:    Housing Stability: Low Risk     Unable to Pay for Housing in the Last Year: No    Number of Places Lived in the Last Year: 1    Unstable Housing in the Last Year: No       Review of Systems   Constitutional: Negative for fever.        Weight loss   HENT: Negative for nosebleeds.    Eyes: Positive for visual disturbance.   Respiratory: Negative for shortness of breath.    Cardiovascular: Negative for chest pain.   Gastrointestinal: Negative for constipation.   Endocrine: Negative for cold intolerance.   Genitourinary: Negative for difficulty urinating.   Musculoskeletal: Positive for back pain and gait problem. Negative for neck pain.   Skin: Negative for color change.   Neurological: Negative for headaches.   Hematological: Does not bruise/bleed easily.   Psychiatric/Behavioral: Positive for dysphoric mood. The patient is nervous/anxious.        OBJECTIVE:     Vital Signs  Temp: 98.1 °F (36.7 °C)  Pulse: (!) 59  BP: 131/64  Pain Score:   4  There is no height or weight on file to calculate BMI.    Physical Exam:     Constitutional: She appears well-developed and well-nourished. No distress.      Eyes: EOM are normal.      Abdominal: Soft.      Skin: Skin displays no rash on extremities. Skin displays no lesions on extremities.     Psych/Behavior: She is alert. She is oriented to person, place, and time.     Musculoskeletal:      Comments: Guarded, myelopathic gait  Hand intrinsics 4/5 bilaterally   Biceps 4+ on R, 4 on L    Decreased arm swing on right     Neurological:        Coordination: She has normal finger to nose coordination.   + Maria's bilaterally   Tremulous with R>L resting tremor      Pulmonary: symmetric bilaterally     Diagnostic Results:  Xrays show stable positioning of  hardware on left   Possible slight change on right, may be due to positioning     ASSESSMENT/PLAN:     Manisha Wynne is a 72 y.o. female with cervical stenosis and myelopathy. She is now s/p C3-C6 laminectomy and fusion on 11/14/22.     She is doing well overall. She has been careful about not over-exerting herself. She has been compliant with use of the collar but has not yet obtained bone growth stimulator. We discussed the importance of obtaining one. She expressed understanding.     I would like her to continue home health PT for now. I would like her to continue to wear the collar for another 6 weeks; at that point, I suspect she will be able to drive and thus participate in outpatient PT.     She is interested in returning to work as of 12/31/22. This is appropriate, so long as she is not lifting >10 lbs.     I would like to see her back in about 6 weeks with repeat xrays.     I have encouraged her to contact the clinic in interim with any questions, concerns, or adverse clinical change.

## 2022-12-27 PROBLEM — I77.89 OTHER SPECIFIED DISORDERS OF ARTERIES AND ARTERIOLES: Status: ACTIVE | Noted: 2022-12-27

## 2023-01-05 ENCOUNTER — CLINICAL SUPPORT (OUTPATIENT)
Dept: CARDIOLOGY | Facility: HOSPITAL | Age: 73
End: 2023-01-05
Payer: MEDICARE

## 2023-01-05 DIAGNOSIS — Z95.0 PRESENCE OF CARDIAC PACEMAKER: ICD-10-CM

## 2023-01-05 DIAGNOSIS — I49.5 SICK SINUS SYNDROME: ICD-10-CM

## 2023-01-05 DIAGNOSIS — I44.0 ATRIOVENTRICULAR BLOCK, FIRST DEGREE: ICD-10-CM

## 2023-01-05 PROCEDURE — 93296 REM INTERROG EVL PM/IDS: CPT | Mod: HCNC | Performed by: INTERNAL MEDICINE

## 2023-01-05 PROCEDURE — 93294 CARDIAC DEVICE CHECK - REMOTE: ICD-10-PCS | Mod: HCNC,,, | Performed by: INTERNAL MEDICINE

## 2023-01-05 PROCEDURE — 93294 REM INTERROG EVL PM/LDLS PM: CPT | Mod: HCNC,,, | Performed by: INTERNAL MEDICINE

## 2023-01-06 NOTE — PROGRESS NOTES
Ms. Wynne is a patient of Dr. Sheets and was last seen in clinic 10/4/2022.      Subjective:   Patient ID:  Manisha Wynne is a 72 y.o. female who presents for follow-up of Pacemaker Check  .     HPI:    Ms. Wynne is a 72 y.o. female with SSS s/p PPPM (2012, gen change 2022), HTN, CEDRIC here for follow up.     Background:    Primary Care Physician: Iris Blue MD    Ms. Wynne has a history of sick sinus syndrome, syncope and high-grade infrahisian block s/p dcPPM (Adam, 9/2012) by Dr. Whelan, hypertension, and CEDRIC here for follow-up.     She saw Svetlana Miner in clinic 5/2019. She reported she had recently experienced a fall while in [place] for a conference. She dislocated her finger, but head CT was negative. She was feeling fatigued and lost her balance as she stepped off the curb. She did not lose consciousness.  Device interrogation showed no arrhythmic episodes during period of her fall. Her last ECHO 1/2019 noted normal LVEF.    10/2019: She reports between January and May she had 3 falls. No syncope. She reports she has a sensory neuropathy and is falling with Neurology for this.     10/12/2020: She did have a mechanical fall in late January and broke her wrist. She was in a clinical trial for a balance device at [facility] but this has ended. Ms. Wynne is doing well from a device perspective with stable lead and device function. No RV pacing. No sustained arrhythmia noted. She is on the digital HTN program.  Continues to work with neurology on her balance issues. Otherwise she feels well.     10/2021: Saw Svetlana Miner. Ms. Wynne reports no chest pain with exertion or at rest, palpitations, SOB, SHEPARD, dizziness, or syncope. She is in the [facility] balance program. Has Apple Watch. No recent falls. She was supposed to have spinal surgery. Canceled due to c diff. Got it again and is on oral vanc.     10/4/2022: Virtual follow up. Device at [facility] and gen change scheduled.      Update (01/10/2023):    10/7/2022: Successful generator change (dual-chamber pacemaker)    11/14/2022 Laminectomy and fusion     Bone stimulator treatment - 10 min test with no intereference    Today she says she feels better than she has in a long time. Has started back at work PRN. A little sore around her pacemaker site, chiara if her cat lies on site. Incision well healed. No CP, worsening SHEPARD, palps, LH, syncope reported.    Device Interrogation (1/10/2023) reveals an intrinsic SB with 1st deg AVB with stable lead and device function. NSATx1, 32 seconds. No ventricular arrhythmias.  She paces 70% in the RA and 0% in the RV. Estimated battery longevity 9-10 years.     I have personally reviewed the patient's EKG today, which shows APVS with RBBB at 62bpm. IA interval is 306. QRS is 138. QTc is 446.    Relevant Cardiac Test Results:    2D Echo (1/25/2019):  Mild-to-moderate aortic regurgitation.  Mild mitral regurgitation.  Mild tricuspid regurgitation.  Normal central venous pressure (3 mm Hg).  The estimated PA systolic pressure is 27 mm Hg  Normal left ventricular systolic function. The estimated ejection fraction is 60%  Indeterminate left ventricular diastolic function.  Normal right ventricular systolic function.    Current Outpatient Medications   Medication Sig    acetaminophen (TYLENOL) 500 MG tablet Take 2 tablets (1,000 mg total) by mouth every 8 (eight) hours.    buPROPion (WELLBUTRIN XL) 300 MG 24 hr tablet Take 1 tablet (300 mg total) by mouth once daily.    calcium-vitamin D tablet 600 mg-200 units Take 1 tablet by mouth once daily.    cholecalciferol, vitamin D3, 125 mcg (5,000 unit) Tab Take 1 tablet (5,000 Units total) by mouth once daily.    FLUoxetine 40 MG capsule Take 1 capsule (40 mg total) by mouth once daily.    gabapentin (NEURONTIN) 100 MG capsule Take 1 capsule (100 mg total) by mouth 3 (three) times daily.    lisdexamfetamine (VYVANSE) 70 MG capsule Take 1 capsule (70 mg total) by  "mouth every morning.    losartan (COZAAR) 25 MG tablet TAKE 1 TABLET EVERY DAY    methyl salicylate-menthol 15-10% 15-10 % Crea Apply topically every evening.    metoprolol tartrate (LOPRESSOR) 25 MG tablet TAKE 1 TABLET TWICE DAILY    pravastatin (PRAVACHOL) 20 MG tablet TAKE 1 TABLET EVERY DAY    senna-docusate 8.6-50 mg (PERICOLACE) 8.6-50 mg per tablet Take 1 tablet by mouth 2 (two) times daily as needed for Constipation.     No current facility-administered medications for this visit.       Review of Systems   Constitutional: Negative for malaise/fatigue.   Cardiovascular:  Negative for chest pain, dyspnea on exertion, irregular heartbeat, leg swelling and palpitations.   Respiratory:  Negative for shortness of breath.    Hematologic/Lymphatic: Negative for bleeding problem.   Skin:  Negative for rash.   Musculoskeletal:  Positive for neck pain (improved) and stiffness. Negative for myalgias.   Gastrointestinal:  Negative for hematemesis, hematochezia and nausea.   Genitourinary:  Negative for hematuria.   Neurological:  Negative for light-headedness.   Psychiatric/Behavioral:  Negative for altered mental status.    Allergic/Immunologic: Negative for persistent infections.     Objective:          BP (!) 140/68   Pulse (!) 59   Ht 5' 8" (1.727 m)   Wt 71 kg (156 lb 8.4 oz)   LMP 10/01/2003   BMI 23.80 kg/m²     Physical Exam  Vitals and nursing note reviewed.   Constitutional:       Appearance: Normal appearance. She is well-developed.   HENT:      Head: Normocephalic.      Nose: Nose normal.   Eyes:      Pupils: Pupils are equal, round, and reactive to light.   Cardiovascular:      Rate and Rhythm: Normal rate and regular rhythm.   Pulmonary:      Effort: No respiratory distress.      Breath sounds: Normal breath sounds.   Chest:      Comments: Device to LUCW. Incision and pocket in good repair.    Musculoskeletal:         General: Normal range of motion.   Skin:     General: Skin is warm and dry.      " Findings: No erythema.   Neurological:      Mental Status: She is alert and oriented to person, place, and time.   Psychiatric:         Speech: Speech normal.         Behavior: Behavior normal.         Lab Results   Component Value Date     12/12/2022    K 4.2 12/12/2022    MG 1.9 12/12/2022    BUN 17 12/12/2022    CREATININE 0.8 12/12/2022    ALT 30 10/27/2022    AST 26 10/27/2022    HGB 9.3 (L) 12/12/2022    HCT 28.4 (L) 12/12/2022    HCT 33 (L) 07/12/2021    TSH 3.152 10/27/2022    LDLCALC 105.2 10/27/2022       Recent Labs   Lab 06/10/21  1111 10/07/22  0523   INR 1.0 1.1         Assessment:     1. Cardiac pacemaker in situ    2. Paroxysmal atrial fibrillation    3. AV block, 1st degree    4. Primary hypertension    5. RBBB    6. SSS (sick sinus syndrome)    7. Stage 3 chronic kidney disease, unspecified whether stage 3a or 3b CKD    8. CEDRIC (obstructive sleep apnea)      Plan:     In summary, Ms. Wynne is a 72 y.o. female with SSS s/p PPPM (2012, gen change 2022), HTN, CEDRIC here for follow up.   She is 3 months s/p generator change. Ms. Wynne is doing well from a device perspective with stable lead and device function. No sustained arrhythmia noted. No RV pacing. EKG with stable RBBB.  She recently had cervical fusion in November. Bone stimulator tested for 10 minutes in device clinic with no noise elicited. She is feeling well.  Note: PAF listed in her chart but no AF has been identified on her device and she is not on OAC.    Continue current medication regimen and device settings.   Follow up in device clinic as scheduled.   Follow up in EP clinic in 1 year, sooner as needed.     *A copy of this note has been sent to Dr. Sheets*    Follow up in about 1 year (around 1/10/2024).    ------------------------------------------------------------------    AL Lanza, NP-C  Cardiac Electrophysiology

## 2023-01-10 ENCOUNTER — OFFICE VISIT (OUTPATIENT)
Dept: ELECTROPHYSIOLOGY | Facility: CLINIC | Age: 73
End: 2023-01-10
Payer: MEDICARE

## 2023-01-10 ENCOUNTER — CLINICAL SUPPORT (OUTPATIENT)
Dept: CARDIOLOGY | Facility: HOSPITAL | Age: 73
End: 2023-01-10
Attending: INTERNAL MEDICINE
Payer: MEDICARE

## 2023-01-10 ENCOUNTER — HOSPITAL ENCOUNTER (OUTPATIENT)
Dept: CARDIOLOGY | Facility: CLINIC | Age: 73
Discharge: HOME | End: 2023-01-10
Payer: MEDICARE

## 2023-01-10 VITALS
WEIGHT: 156.5 LBS | DIASTOLIC BLOOD PRESSURE: 68 MMHG | HEART RATE: 59 BPM | SYSTOLIC BLOOD PRESSURE: 140 MMHG | BODY MASS INDEX: 23.72 KG/M2 | HEIGHT: 68 IN

## 2023-01-10 DIAGNOSIS — I49.8 OTHER SPECIFIED CARDIAC ARRHYTHMIAS: ICD-10-CM

## 2023-01-10 DIAGNOSIS — I48.0 PAROXYSMAL ATRIAL FIBRILLATION: ICD-10-CM

## 2023-01-10 DIAGNOSIS — I44.0 AV BLOCK, 1ST DEGREE: Chronic | ICD-10-CM

## 2023-01-10 DIAGNOSIS — I45.10 RBBB: ICD-10-CM

## 2023-01-10 DIAGNOSIS — Z95.0 CARDIAC PACEMAKER IN SITU: Primary | ICD-10-CM

## 2023-01-10 DIAGNOSIS — G47.33 OSA (OBSTRUCTIVE SLEEP APNEA): ICD-10-CM

## 2023-01-10 DIAGNOSIS — N18.30 STAGE 3 CHRONIC KIDNEY DISEASE, UNSPECIFIED WHETHER STAGE 3A OR 3B CKD: ICD-10-CM

## 2023-01-10 DIAGNOSIS — I49.5 SSS (SICK SINUS SYNDROME): Chronic | ICD-10-CM

## 2023-01-10 DIAGNOSIS — I10 PRIMARY HYPERTENSION: Chronic | ICD-10-CM

## 2023-01-10 PROCEDURE — 3078F PR MOST RECENT DIASTOLIC BLOOD PRESSURE < 80 MM HG: ICD-10-PCS | Mod: HCNC,CPTII,S$GLB, | Performed by: NURSE PRACTITIONER

## 2023-01-10 PROCEDURE — 1159F PR MEDICATION LIST DOCUMENTED IN MEDICAL RECORD: ICD-10-PCS | Mod: HCNC,CPTII,S$GLB, | Performed by: NURSE PRACTITIONER

## 2023-01-10 PROCEDURE — 1160F RVW MEDS BY RX/DR IN RCRD: CPT | Mod: HCNC,CPTII,S$GLB, | Performed by: NURSE PRACTITIONER

## 2023-01-10 PROCEDURE — 1157F ADVNC CARE PLAN IN RCRD: CPT | Mod: HCNC,CPTII,S$GLB, | Performed by: NURSE PRACTITIONER

## 2023-01-10 PROCEDURE — 3077F PR MOST RECENT SYSTOLIC BLOOD PRESSURE >= 140 MM HG: ICD-10-PCS | Mod: HCNC,CPTII,S$GLB, | Performed by: NURSE PRACTITIONER

## 2023-01-10 PROCEDURE — 3078F DIAST BP <80 MM HG: CPT | Mod: HCNC,CPTII,S$GLB, | Performed by: NURSE PRACTITIONER

## 2023-01-10 PROCEDURE — 1157F PR ADVANCE CARE PLAN OR EQUIV PRESENT IN MEDICAL RECORD: ICD-10-PCS | Mod: HCNC,CPTII,S$GLB, | Performed by: NURSE PRACTITIONER

## 2023-01-10 PROCEDURE — 93010 ELECTROCARDIOGRAM REPORT: CPT | Mod: HCNC,S$GLB,, | Performed by: INTERNAL MEDICINE

## 2023-01-10 PROCEDURE — 1100F PTFALLS ASSESS-DOCD GE2>/YR: CPT | Mod: HCNC,CPTII,S$GLB, | Performed by: NURSE PRACTITIONER

## 2023-01-10 PROCEDURE — 99214 PR OFFICE/OUTPT VISIT, EST, LEVL IV, 30-39 MIN: ICD-10-PCS | Mod: HCNC,S$GLB,, | Performed by: NURSE PRACTITIONER

## 2023-01-10 PROCEDURE — 1111F PR DISCHARGE MEDS RECONCILED W/ CURRENT OUTPATIENT MED LIST: ICD-10-PCS | Mod: HCNC,CPTII,S$GLB, | Performed by: NURSE PRACTITIONER

## 2023-01-10 PROCEDURE — 1126F AMNT PAIN NOTED NONE PRSNT: CPT | Mod: HCNC,CPTII,S$GLB, | Performed by: NURSE PRACTITIONER

## 2023-01-10 PROCEDURE — 93280 PM DEVICE PROGR EVAL DUAL: CPT | Mod: HCNC

## 2023-01-10 PROCEDURE — 3288F PR FALLS RISK ASSESSMENT DOCUMENTED: ICD-10-PCS | Mod: HCNC,CPTII,S$GLB, | Performed by: NURSE PRACTITIONER

## 2023-01-10 PROCEDURE — 93280 CARDIAC DEVICE CHECK - IN CLINIC & HOSPITAL: ICD-10-PCS | Mod: 26,HCNC,, | Performed by: INTERNAL MEDICINE

## 2023-01-10 PROCEDURE — 93005 RHYTHM STRIP: ICD-10-PCS | Mod: HCNC,S$GLB,, | Performed by: INTERNAL MEDICINE

## 2023-01-10 PROCEDURE — 1126F PR PAIN SEVERITY QUANTIFIED, NO PAIN PRESENT: ICD-10-PCS | Mod: HCNC,CPTII,S$GLB, | Performed by: NURSE PRACTITIONER

## 2023-01-10 PROCEDURE — 93010 RHYTHM STRIP: ICD-10-PCS | Mod: HCNC,S$GLB,, | Performed by: INTERNAL MEDICINE

## 2023-01-10 PROCEDURE — 93005 ELECTROCARDIOGRAM TRACING: CPT | Mod: HCNC,S$GLB,, | Performed by: INTERNAL MEDICINE

## 2023-01-10 PROCEDURE — 99214 OFFICE O/P EST MOD 30 MIN: CPT | Mod: HCNC,S$GLB,, | Performed by: NURSE PRACTITIONER

## 2023-01-10 PROCEDURE — 3008F PR BODY MASS INDEX (BMI) DOCUMENTED: ICD-10-PCS | Mod: HCNC,CPTII,S$GLB, | Performed by: NURSE PRACTITIONER

## 2023-01-10 PROCEDURE — 1160F PR REVIEW ALL MEDS BY PRESCRIBER/CLIN PHARMACIST DOCUMENTED: ICD-10-PCS | Mod: HCNC,CPTII,S$GLB, | Performed by: NURSE PRACTITIONER

## 2023-01-10 PROCEDURE — 99999 PR PBB SHADOW E&M-EST. PATIENT-LVL III: CPT | Mod: PBBFAC,HCNC,, | Performed by: NURSE PRACTITIONER

## 2023-01-10 PROCEDURE — 1159F MED LIST DOCD IN RCRD: CPT | Mod: HCNC,CPTII,S$GLB, | Performed by: NURSE PRACTITIONER

## 2023-01-10 PROCEDURE — 3288F FALL RISK ASSESSMENT DOCD: CPT | Mod: HCNC,CPTII,S$GLB, | Performed by: NURSE PRACTITIONER

## 2023-01-10 PROCEDURE — 3077F SYST BP >= 140 MM HG: CPT | Mod: HCNC,CPTII,S$GLB, | Performed by: NURSE PRACTITIONER

## 2023-01-10 PROCEDURE — 1111F DSCHRG MED/CURRENT MED MERGE: CPT | Mod: HCNC,CPTII,S$GLB, | Performed by: NURSE PRACTITIONER

## 2023-01-10 PROCEDURE — 3008F BODY MASS INDEX DOCD: CPT | Mod: HCNC,CPTII,S$GLB, | Performed by: NURSE PRACTITIONER

## 2023-01-10 PROCEDURE — 99999 PR PBB SHADOW E&M-EST. PATIENT-LVL III: ICD-10-PCS | Mod: PBBFAC,HCNC,, | Performed by: NURSE PRACTITIONER

## 2023-01-10 PROCEDURE — 1100F PR PT FALLS ASSESS DOC 2+ FALLS/FALL W/INJURY/YR: ICD-10-PCS | Mod: HCNC,CPTII,S$GLB, | Performed by: NURSE PRACTITIONER

## 2023-01-10 PROCEDURE — 93280 PM DEVICE PROGR EVAL DUAL: CPT | Mod: 26,HCNC,, | Performed by: INTERNAL MEDICINE

## 2023-01-17 ENCOUNTER — DOCUMENT SCAN (OUTPATIENT)
Dept: HOME HEALTH SERVICES | Facility: HOSPITAL | Age: 73
End: 2023-01-17
Payer: MEDICARE

## 2023-01-27 ENCOUNTER — DOCUMENT SCAN (OUTPATIENT)
Dept: HOME HEALTH SERVICES | Facility: HOSPITAL | Age: 73
End: 2023-01-27
Payer: MEDICARE

## 2023-02-02 ENCOUNTER — HOSPITAL ENCOUNTER (OUTPATIENT)
Dept: RADIOLOGY | Facility: HOSPITAL | Age: 73
Discharge: HOME | End: 2023-02-02
Attending: NEUROLOGICAL SURGERY
Payer: MEDICARE

## 2023-02-02 ENCOUNTER — OFFICE VISIT (OUTPATIENT)
Dept: NEUROSURGERY | Facility: CLINIC | Age: 73
End: 2023-02-02
Payer: MEDICARE

## 2023-02-02 ENCOUNTER — OFFICE VISIT (OUTPATIENT)
Dept: NEUROLOGY | Facility: CLINIC | Age: 73
End: 2023-02-02
Payer: MEDICARE

## 2023-02-02 VITALS
HEIGHT: 68 IN | DIASTOLIC BLOOD PRESSURE: 71 MMHG | BODY MASS INDEX: 24.48 KG/M2 | WEIGHT: 161.5 LBS | HEART RATE: 59 BPM | SYSTOLIC BLOOD PRESSURE: 122 MMHG

## 2023-02-02 DIAGNOSIS — R25.1 TREMOR: ICD-10-CM

## 2023-02-02 DIAGNOSIS — M48.02 CERVICAL STENOSIS OF SPINE: ICD-10-CM

## 2023-02-02 DIAGNOSIS — M47.12 CERVICAL SPONDYLOSIS WITH MYELOPATHY: ICD-10-CM

## 2023-02-02 DIAGNOSIS — I77.89 OTHER SPECIFIED DISORDERS OF ARTERIES AND ARTERIOLES: ICD-10-CM

## 2023-02-02 DIAGNOSIS — R29.6 MULTIPLE FALLS: ICD-10-CM

## 2023-02-02 DIAGNOSIS — R26.81 GAIT INSTABILITY: Primary | ICD-10-CM

## 2023-02-02 DIAGNOSIS — G95.9 CERVICAL MYELOPATHY: ICD-10-CM

## 2023-02-02 DIAGNOSIS — Z71.89 COUNSELING REGARDING GOALS OF CARE: ICD-10-CM

## 2023-02-02 DIAGNOSIS — M47.12 CERVICAL SPONDYLOSIS WITH MYELOPATHY: Primary | ICD-10-CM

## 2023-02-02 DIAGNOSIS — G60.8 SENSORY PERIPHERAL NEUROPATHY: ICD-10-CM

## 2023-02-02 PROCEDURE — 99999 PR PBB SHADOW E&M-EST. PATIENT-LVL I: ICD-10-PCS | Mod: PBBFAC,HCNC,, | Performed by: NEUROLOGICAL SURGERY

## 2023-02-02 PROCEDURE — 72040 X-RAY EXAM NECK SPINE 2-3 VW: CPT | Mod: 26,HCNC,, | Performed by: RADIOLOGY

## 2023-02-02 PROCEDURE — 1159F PR MEDICATION LIST DOCUMENTED IN MEDICAL RECORD: ICD-10-PCS | Mod: HCNC,CPTII,S$GLB, | Performed by: PHYSICIAN ASSISTANT

## 2023-02-02 PROCEDURE — 99214 PR OFFICE/OUTPT VISIT, EST, LEVL IV, 30-39 MIN: ICD-10-PCS | Mod: HCNC,S$GLB,, | Performed by: PHYSICIAN ASSISTANT

## 2023-02-02 PROCEDURE — 99999 PR PBB SHADOW E&M-EST. PATIENT-LVL I: CPT | Mod: PBBFAC,HCNC,, | Performed by: NEUROLOGICAL SURGERY

## 2023-02-02 PROCEDURE — 3288F PR FALLS RISK ASSESSMENT DOCUMENTED: ICD-10-PCS | Mod: HCNC,CPTII,S$GLB, | Performed by: PHYSICIAN ASSISTANT

## 2023-02-02 PROCEDURE — 1125F AMNT PAIN NOTED PAIN PRSNT: CPT | Mod: HCNC,CPTII,S$GLB, | Performed by: PHYSICIAN ASSISTANT

## 2023-02-02 PROCEDURE — 1160F RVW MEDS BY RX/DR IN RCRD: CPT | Mod: HCNC,CPTII,S$GLB, | Performed by: PHYSICIAN ASSISTANT

## 2023-02-02 PROCEDURE — 99024 PR POST-OP FOLLOW-UP VISIT: ICD-10-PCS | Mod: HCNC,S$GLB,, | Performed by: NEUROLOGICAL SURGERY

## 2023-02-02 PROCEDURE — 1101F PT FALLS ASSESS-DOCD LE1/YR: CPT | Mod: HCNC,CPTII,S$GLB, | Performed by: PHYSICIAN ASSISTANT

## 2023-02-02 PROCEDURE — 3074F PR MOST RECENT SYSTOLIC BLOOD PRESSURE < 130 MM HG: ICD-10-PCS | Mod: HCNC,CPTII,S$GLB, | Performed by: PHYSICIAN ASSISTANT

## 2023-02-02 PROCEDURE — 1159F MED LIST DOCD IN RCRD: CPT | Mod: HCNC,CPTII,S$GLB, | Performed by: PHYSICIAN ASSISTANT

## 2023-02-02 PROCEDURE — 3078F PR MOST RECENT DIASTOLIC BLOOD PRESSURE < 80 MM HG: ICD-10-PCS | Mod: HCNC,CPTII,S$GLB, | Performed by: PHYSICIAN ASSISTANT

## 2023-02-02 PROCEDURE — 1101F PR PT FALLS ASSESS DOC 0-1 FALLS W/OUT INJ PAST YR: ICD-10-PCS | Mod: HCNC,CPTII,S$GLB, | Performed by: PHYSICIAN ASSISTANT

## 2023-02-02 PROCEDURE — 3288F FALL RISK ASSESSMENT DOCD: CPT | Mod: HCNC,CPTII,S$GLB, | Performed by: PHYSICIAN ASSISTANT

## 2023-02-02 PROCEDURE — 1157F PR ADVANCE CARE PLAN OR EQUIV PRESENT IN MEDICAL RECORD: ICD-10-PCS | Mod: HCNC,CPTII,S$GLB, | Performed by: PHYSICIAN ASSISTANT

## 2023-02-02 PROCEDURE — 99024 POSTOP FOLLOW-UP VISIT: CPT | Mod: HCNC,S$GLB,, | Performed by: NEUROLOGICAL SURGERY

## 2023-02-02 PROCEDURE — 3074F SYST BP LT 130 MM HG: CPT | Mod: HCNC,CPTII,S$GLB, | Performed by: PHYSICIAN ASSISTANT

## 2023-02-02 PROCEDURE — 99214 OFFICE O/P EST MOD 30 MIN: CPT | Mod: HCNC,S$GLB,, | Performed by: PHYSICIAN ASSISTANT

## 2023-02-02 PROCEDURE — 1157F ADVNC CARE PLAN IN RCRD: CPT | Mod: HCNC,CPTII,S$GLB, | Performed by: NEUROLOGICAL SURGERY

## 2023-02-02 PROCEDURE — 1157F ADVNC CARE PLAN IN RCRD: CPT | Mod: HCNC,CPTII,S$GLB, | Performed by: PHYSICIAN ASSISTANT

## 2023-02-02 PROCEDURE — 3008F PR BODY MASS INDEX (BMI) DOCUMENTED: ICD-10-PCS | Mod: HCNC,CPTII,S$GLB, | Performed by: PHYSICIAN ASSISTANT

## 2023-02-02 PROCEDURE — 3078F DIAST BP <80 MM HG: CPT | Mod: HCNC,CPTII,S$GLB, | Performed by: PHYSICIAN ASSISTANT

## 2023-02-02 PROCEDURE — 72040 X-RAY EXAM NECK SPINE 2-3 VW: CPT | Mod: TC,HCNC

## 2023-02-02 PROCEDURE — 99999 PR PBB SHADOW E&M-EST. PATIENT-LVL III: ICD-10-PCS | Mod: PBBFAC,HCNC,, | Performed by: PHYSICIAN ASSISTANT

## 2023-02-02 PROCEDURE — 1157F PR ADVANCE CARE PLAN OR EQUIV PRESENT IN MEDICAL RECORD: ICD-10-PCS | Mod: HCNC,CPTII,S$GLB, | Performed by: NEUROLOGICAL SURGERY

## 2023-02-02 PROCEDURE — 1125F PR PAIN SEVERITY QUANTIFIED, PAIN PRESENT: ICD-10-PCS | Mod: HCNC,CPTII,S$GLB, | Performed by: PHYSICIAN ASSISTANT

## 2023-02-02 PROCEDURE — 99999 PR PBB SHADOW E&M-EST. PATIENT-LVL III: CPT | Mod: PBBFAC,HCNC,, | Performed by: PHYSICIAN ASSISTANT

## 2023-02-02 PROCEDURE — 72040 XR CERVICAL SPINE AP LATERAL: ICD-10-PCS | Mod: 26,HCNC,, | Performed by: RADIOLOGY

## 2023-02-02 PROCEDURE — 3008F BODY MASS INDEX DOCD: CPT | Mod: HCNC,CPTII,S$GLB, | Performed by: PHYSICIAN ASSISTANT

## 2023-02-02 PROCEDURE — 1160F PR REVIEW ALL MEDS BY PRESCRIBER/CLIN PHARMACIST DOCUMENTED: ICD-10-PCS | Mod: HCNC,CPTII,S$GLB, | Performed by: PHYSICIAN ASSISTANT

## 2023-02-02 NOTE — PROGRESS NOTES
"  Neurosurgery  Follow up     SUBJECTIVE:     Chief Complaint: "here for neck evaluation"     History of Present Illness:  Manisha Wynne is a 72 y.o. female (former  at [employer]) who presents as a referral from Dr. Mcduffie for evaluation of cervical myelopathy. The patient reports that she has had several falls from [id number], which prompted her to seek neurologic evaluation. The falls have resulted in injuries, including breaking her wrist. She also has numbness, tingling, and burning in her feet. She denies pain in her neck, but endorses in her mid- and low back. Her pain is 10/10 in the legs, 10/10 in the back, and 0/10 in the neck and arms. The pain is intermittent. It is made worse by sitting. It improves with standing. She denies any change in bowel/bladder habit.     She endorses handwriting changes, dropping objects, and balance/gait difficulty. She denies fine motor changes. She has not seen pain management for her neck, but she is seeing Dr. Booker for "sciatica."     CT of the cervical spine (the patient has a non-MRI-conditional compatible pacemaker in place) was obtained and personally reviewed, demonstrating significant stenosis at C5-C6>C4-C5. Flex ex shows some mild instability of C3 on C4.     She lives alone with her tuxedo cats. Her family is in [place] and [facility]. She does have friends nearby who will help to care for her in perioperative periods. She takes ASA 81 daily.     As of 5/6/21, the patient had the myelogram as requested. This is personally reviewed and demonstrates significant cord compression and OPLL. There is some stenosis at L4-L5 as well.     The patient reports that she has been well. She has had no falls. She has been doing PT for her sciatica, which she has found helpful. She does note that her tremor, which may be progressing slightly, is now interfering with her applying makeup at times.     As of 5/25/21, the patient reports that she has not had any significant " changes. She presents today with a list of questions, together with friend Kavya Roberts on the phone. She also endorses a several year history of urinary incontinence (episodes occur overnight or with coughing) for which she was going to participate in pelvic floor rehab and see urogyn; however, this was interrupted by Covid.     As of 1/11/22, the patient reports that she has had ongoing C. Diff since we scheduled surgery. She was seen about a potential colonoscopy today, but was advised against proceeding. She is due for a CT of the abdomen/pelvis next week to look at the pancreas. She is also being considered for a fecal transplant. There is also a clinical trial going on in which she's interested.     Dr. Maradiaga of ID has been quarterbacking the C. Diff response. The patient is frustrated by her lack of improvement.     She is less fatigued now than when she initially became sick, but she does not feel she has yet recovered to her baseline. She is also being recommended for cataract surgery and pacemaker generator replacement in about 13 months.     As of 8/4/22, the patient reports that she is finally over her C. Diff. 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 She notes that she has been really careful. She walks very slowly. She has noticed some tremor in her hands at rest. This gets better when she holds onto an object. She denies anosmia. She denies significant sleep difficulties and other non-motor symptoms of PD.     As of 12/22/22, the patient underwent C3-C6 posterior cervical decompression and fusion on 11/14/22. She reports that she has been doing well since surgery. She has NOT been using a bone growth stimulator. She denies fever, chills, or drainage from the incision. She has no complaints of C. Diff. She is eager to return to work in the 's office.     As of 2/2/23, the patient returns in scheduled follow-up. She reports she has been well. She is walking 6000  steps several times a week. She has returned to work, though she has been a bit more forgetful about dates and times. We discussed Dr. Cordero.     She saw Sunitha this morning, who is starting outpatient PT.     Bone growth stimulator was received, and she has been using it.     She reports that she had a mechanical trip and fall in which her head didn't hit the ground last week.      Review of patient's allergies indicates:  No Known Allergies    Current Outpatient Medications   Medication Sig Dispense Refill    acetaminophen (TYLENOL) 500 MG tablet Take 2 tablets (1,000 mg total) by mouth every 8 (eight) hours.  0    buPROPion (WELLBUTRIN XL) 300 MG 24 hr tablet Take 1 tablet (300 mg total) by mouth once daily. 30 tablet 0    calcium-vitamin D tablet 600 mg-200 units Take 1 tablet by mouth once daily. 30 tablet 0    FLUoxetine 40 MG capsule Take 1 capsule (40 mg total) by mouth once daily. 30 capsule 0    gabapentin (NEURONTIN) 100 MG capsule Take 1 capsule (100 mg total) by mouth 3 (three) times daily. 270 capsule 1    lisdexamfetamine (VYVANSE) 70 MG capsule Take 1 capsule (70 mg total) by mouth every morning. 30 capsule 0    losartan (COZAAR) 25 MG tablet TAKE 1 TABLET EVERY DAY 90 tablet 6    methyl salicylate-menthol 15-10% 15-10 % Crea Apply topically every evening. 113 g 0    metoprolol tartrate (LOPRESSOR) 25 MG tablet TAKE 1 TABLET TWICE DAILY 180 tablet 3    pravastatin (PRAVACHOL) 20 MG tablet TAKE 1 TABLET EVERY DAY 90 tablet 3     No current facility-administered medications for this visit.       Past Medical History:   Diagnosis Date    Abnormal Pap smear     Atrial fibrillation     AV block, 1st degree 07/25/2012    C. difficile diarrhea     RESOLVED    Cataract     Depression 07/24/2012    Facet arthritis of lumbar region 03/31/2015    Falls     3 falls in the last 6 mos--noted 6/19/19    Hyperlipidemia     Hypertension 07/24/2012    Neuropathy     Other specified cardiac dysrhythmias(427.89)      Sleep apnea     Syncope 07/24/2012    Tremors of nervous system     hands bilaterally     Past Surgical History:   Procedure Laterality Date    APPLICATION OF CARTILAGE GRAFT Bilateral 12/19/2019    Procedure: APPLICATION, CARTILAGE GRAFT AURICULAR JEZ;  Surgeon: Martinez Flores III, MD;  Location: [facility];  Service: ENT;  Laterality: Bilateral;    CARDIAC PACEMAKER PLACEMENT  09/07/2012    [id number]    CATARACT EXTRACTION W/  INTRAOCULAR LENS IMPLANT Right 5/16/2022    Procedure: EXTRACTION, CATARACT, WITH IOL INSERTION;  Surgeon: Ines Aguilera MD;  Location: [facility];  Service: Ophthalmology;  Laterality: Right;  Catalys     CATARACT EXTRACTION W/  INTRAOCULAR LENS IMPLANT Left 6/20/2022    Procedure: EXTRACTION, CATARACT, WITH IOL INSERTION;  Surgeon: Ines Aguilera MD;  Location: [facility];  Service: Ophthalmology;  Laterality: Left;  Catalys     DILATION AND CURETTAGE OF UTERUS      Hx of precancerous cells?    ENDOSCOPIC ULTRASOUND OF UPPER GASTROINTESTINAL TRACT N/A 7/5/2019    Procedure: ULTRASOUND, UPPER GI TRACT, ENDOSCOPIC;  Surgeon: Kev Calderon MD;  Location: [facility] (2ND FLR);  Service: Endoscopy;  Laterality: N/A;  Pacemaker-Adam   appt confirmed-rb    MYELOGRAPHY N/A 5/4/2021    Procedure: Myelogram  CERVICAL, THORACIC AND LUMBAR;  Surgeon: [facility];  Location: [facility] 2ND FLR;  Service: Radiology;  Laterality: N/A;    NASAL SEPTOPLASTY N/A 12/19/2019    Procedure: SEPTOPLASTY, NOSE;  Surgeon: Martinez Flores III, MD;  Location: [facility];  Service: ENT;  Laterality: N/A;  FOLLOW DR SALVATORE KIMBROUGH PROTOCOL    OPEN REDUCTION AND INTERNAL FIXATION (ORIF) OF FRACTURE OF DISTAL RADIUS Left 1/24/2020    Procedure: ORIF, FRACTURE, RADIUS, DISTAL-left;  Surgeon: Ellen Moe MD;  Location: [facility];  Service: Orthopedics;  Laterality: Left;    POSTERIOR FUSION OF CERVICAL SPINE WITH LAMINECTOMY N/A 11/14/2022    Procedure: LAMINECTOMY, SPINE, CERVICAL, WITH  POSTERIOR FUSION C3-C6;  Surgeon: Nicolette Cheek MD;  Location: [address] FLR;  Service: Neurosurgery;  Laterality: N/A;  TORONTO III, ASA III, BLOOD TYPE AND SCREEN, NEUROMONITORING EMG SEP MEP, BRACE/HALO [place], BED REGULAR BED, HEADREST [place], POSITION PRONE, SPECIAL EQUIPMENT NIKI [place], RADIOLOGY C-ARM, EXT. BONE STIMULATOR BIOMET    REPLACEMENT OF PACEMAKER GENERATOR Left 10/7/2022    Procedure: REPLACEMENT, PACEMAKER GENERATOR;  Surgeon: John Sheets MD;  Location: [facility] EP LAB;  Service: Cardiology;  Laterality: Left;  YAS, SSS, AVB, Dual PPM Gen Change,SJM, MAC ,AL, 3 prep,** Adam dcPPM in situ**    SURGICAL REMOVAL OF NASAL TURBINATE Bilateral 2019    Procedure: EXCISION, NASAL TURBINATE;  Surgeon: Martinez Flores III, MD;  Location: [facility];  Service: ENT;  Laterality: Bilateral;    TONSILLECTOMY      WISDOM TOOTH EXTRACTION       Family History    None       Social History     Socioeconomic History    Marital status:    Occupational History    Occupation: /Rabbi     Employer: [employer]   Tobacco Use    Smoking status: Former     Packs/day: 0.25     Years: 15.00     Pack years: 3.75     Types: Cigarettes     Quit date: 1982     Years since quittin.5    Smokeless tobacco: Former   Substance and Sexual Activity    Alcohol use: Yes     Comment: no daily or heavy use, ~4 times per month    Drug use: No    Sexual activity: Not Currently     Partners: Male     Social Determinants of Health     Financial Resource Strain: Low Risk     Difficulty of Paying Living Expenses: Not hard at all   Food Insecurity: No Food Insecurity    Worried About Running Out of Food in the Last Year: Never true    Ran Out of Food in the Last Year: Never true   Transportation Needs: No Transportation Needs    Lack of Transportation (Medical): No    Lack of Transportation (Non-Medical): No   Physical Activity: Unknown    Days of Exercise per Week: Patient refused     Minutes of Exercise per Session: 60 min   Stress: No Stress Concern Present    Feeling of Stress : Not at all   Social Connections: Unknown    Frequency of Communication with Friends and Family: More than three times a week    Frequency of Social Gatherings with Friends and Family: Once a week    Active Member of Clubs or Organizations: Yes    Attends Club or Organization Meetings: More than 4 times per year    Marital Status: Patient refused   Housing Stability: Low Risk     Unable to Pay for Housing in the Last Year: No    Number of Places Lived in the Last Year: 1    Unstable Housing in the Last Year: No       Review of Systems   Constitutional: Negative for fever.        Weight loss   HENT: Negative for nosebleeds.    Eyes: Positive for visual disturbance.   Respiratory: Negative for shortness of breath.    Cardiovascular: Negative for chest pain.   Gastrointestinal: Negative for constipation.   Endocrine: Negative for cold intolerance.   Genitourinary: Negative for difficulty urinating.   Musculoskeletal: Positive for back pain and gait problem. Negative for neck pain.   Skin: Negative for color change.   Neurological: Negative for headaches.   Hematological: Does not bruise/bleed easily.   Psychiatric/Behavioral: Positive for dysphoric mood. The patient is nervous/anxious.        OBJECTIVE:     Vital Signs     There is no height or weight on file to calculate BMI.    Physical Exam:     Constitutional: She appears well-developed and well-nourished. No distress.      Eyes: EOM are normal.      Abdominal: Soft.      Skin: Skin displays no rash on extremities. Skin displays no lesions on extremities.     Psych/Behavior: She is alert. She is oriented to person, place, and time.     Musculoskeletal:      Comments: Guarded, myelopathic gait  Hand intrinsics 4+/5 bilaterally   Biceps 4+    Decreased arm swing on right     Neurological:        Coordination: She has normal finger to nose coordination.   + Maria's  bilaterally   Tremulous with R>L resting tremor      Pulmonary: symmetric bilaterally     Diagnostic Results:  Xrays show stable positioning of hardware     ASSESSMENT/PLAN:     Manisha Wynne is a 72 y.o. female with cervical stenosis and myelopathy. She is now s/p C3-C6 laminectomy and fusion on 11/14/22.     She is doing well overall. She has been careful about not over-exerting herself. She has been compliant with use of the collar and bone growth stimulator.    I would like her to participate in PT.     I would like to see her back about 6 months postop with CT cervical spine to assess for arthrodesis.     I have encouraged her to contact the clinic in interim with any questions, concerns, or adverse clinical change.

## 2023-02-02 NOTE — PROGRESS NOTES
Name: Manisha Wynne  MRN: [id number]   CSN: [id number]      Date: 02/02/2023    Referring physician:  No referring provider defined for this encounter.    Chief Complaint: tremor      Interval History:  - s/p cervical laminectomy with Dr. Cheek  - sometimes feels balance is better, at homes not   - uses walking stick  - feet feel wobbly, like part of them isnt on the ground   - one fall last week, it was dark and she was trying to step off of a curb    - notices tremors after exertion, feels weak   - she is doing exercises from PT   - describes of sciatic pain on her R buttock, radiates down her leg to her knee       Emg from 7/2021  Conclusion:   Abnormal study.   There is electrophysiologic evidence of:   1. Bilateral chronic L5 radiculopathy   2. The bilaterally absent minimum H-reflex latencies are suggestive of underlying bilateral S1 chronic radiculopathy versus underlying early sensory motor axonal polyneuropathy. Clinical correlation recommended.       From Oct 2022 Manisha Wynne is a R handed 72 y.o. female with a medical issues significant for lumbar radiculopathy, cervical myelopathy, HTN, afib s/p pacemaker, HLD, CEDRIC, CKDIII, SNHL, hyperparathyroidism who presents for tremors. Referral from Dr. Nicolette Cheek. Previously following with Dr. Mcduffie for neuropathy. Scheduled for cervical spine surgery next month. She has had tremors for a year or two, feels that they are getting worse. Worse in her R hand. Tremor started in both hands at the same time. Does not notice tremor at rest. Mostly notices tremor whenever she goes to do something, opening a jar, chopping. Notices it whenever she holds a pen, affects her handwriting -- shaky, denies micrographia.   She has not noticed improvement with EtOH. Drops things out of her hands, thinks maybe tremor contributes some to this? She has balance issues, in 2019 she had 4 falls. She had been doing really well/had been very careful. Fell Sept 18th and broke  her nose. Fell the other day in her bedroom, slipped on hardwood floor. Denies feeling lightheaded or dizzy. She has a history of neuropathy, unclear etiology and has been following with Dr. Mcduffie. She had genetic testing that was inconclusive (a few genes of unknown significance).   Currently on metoprolol, prescribed by cardiology -- scheduled to see them tomorrow.   Currently takes vyvanse, drinks 1 cup of coffee a day. Tremor worse with anxiety.     Pacemaker is not MRI compatible.     Family History: adopted     Neuroleptic Exposure: abilify in 2014, short course      Nonmotor/Premotor ROS:  Anosmia: normal  Dysarthria/Hypophonia: none   Dysphagia/Sialorrhea: some dysphagia -- has a sensation in her throat, occurs whenever she goes into sleep or out of sleep, feels like something is stuck in her throat   Depression: yes, on fluoxetine and wellbutrin -- follows with psychiatry   Urinary changes: frequency and urgency   Constipation: struggled with cdiff for a year  Falls: yes as above   Micrographia: none   Sleep issues:  -RBD: none       Review of Systems:   Review of Systems   Constitutional:  Negative for chills, fever and malaise/fatigue.   HENT:  Negative for hearing loss.    Eyes:  Negative for blurred vision and double vision.   Respiratory:  Negative for cough, shortness of breath and stridor.    Cardiovascular:  Negative for chest pain and leg swelling.   Gastrointestinal:  Negative for constipation, diarrhea and nausea.   Genitourinary:  Negative for frequency and urgency.   Musculoskeletal:  Positive for falls.   Skin:  Negative for itching and rash.   Neurological:  Positive for tremors. Negative for dizziness, loss of consciousness and weakness.   Psychiatric/Behavioral:  Negative for hallucinations and memory loss. The patient is nervous/anxious.          Past Medical History: The patient  has a past medical history of Abnormal Pap smear, Atrial fibrillation, AV block, 1st degree (07/25/2012), CMercedes  "difficile diarrhea, Cataract, Depression (07/24/2012), Facet arthritis of lumbar region (03/31/2015), Falls, Hyperlipidemia, Hypertension (07/24/2012), Neuropathy, Other specified cardiac dysrhythmias(427.89), Sleep apnea, Syncope (07/24/2012), and Tremors of nervous system.    Social History: The patient  reports that she quit smoking about 40 years ago. Her smoking use included cigarettes. She has a 3.75 pack-year smoking history. She has quit using smokeless tobacco. She reports current alcohol use. She reports that she does not use drugs.    Family History: Their family history is not on file. She was adopted.    Allergies: Patient has no known allergies.     Meds:   Current Outpatient Medications on File Prior to Visit   Medication Sig Dispense Refill    acetaminophen (TYLENOL) 500 MG tablet Take 2 tablets (1,000 mg total) by mouth every 8 (eight) hours.  0    buPROPion (WELLBUTRIN XL) 300 MG 24 hr tablet Take 1 tablet (300 mg total) by mouth once daily. 30 tablet 0    calcium-vitamin D tablet 600 mg-200 units Take 1 tablet by mouth once daily. 30 tablet 0    FLUoxetine 40 MG capsule Take 1 capsule (40 mg total) by mouth once daily. 30 capsule 0    gabapentin (NEURONTIN) 100 MG capsule Take 1 capsule (100 mg total) by mouth 3 (three) times daily. 270 capsule 1    lisdexamfetamine (VYVANSE) 70 MG capsule Take 1 capsule (70 mg total) by mouth every morning. 30 capsule 0    losartan (COZAAR) 25 MG tablet TAKE 1 TABLET EVERY DAY 90 tablet 6    methyl salicylate-menthol 15-10% 15-10 % Crea Apply topically every evening. 113 g 0    metoprolol tartrate (LOPRESSOR) 25 MG tablet TAKE 1 TABLET TWICE DAILY 180 tablet 3    pravastatin (PRAVACHOL) 20 MG tablet TAKE 1 TABLET EVERY DAY 90 tablet 3     No current facility-administered medications on file prior to visit.       Exam:  /71 (BP Location: Right arm, Patient Position: Sitting, BP Method: Medium (Automatic))   Pulse (!) 59   Ht 5' 8" (1.727 m)   Wt 73.2 kg " (161 lb 7.8 oz)   LMP 10/01/2003   BMI 24.55 kg/m²     Constitutional  Well-developed, well-nourished, appears stated age   Ophthalmoscopic  No papilledema with no hemorrhages or exudates bilaterally   Cardiovascular  Radial pulses 2+ and symmetric, no LE edema bilaterally   Neurological    * Mental status  MOCA =      - Orientation  Oriented to person, place, time, and situation     - Memory   Intact recent and remote     - Attention/concentration  Attentive, vigilant during exam     - Language  Naming & repetition intact, +2-step commands     - Fund of knowledge  Aware of current events     - Executive  Well-organized thoughts     - Other     * Cranial nerves       - CN II  PERRL, visual fields full to confrontation     - CN III, IV, VI  Extraocular movements full, normal pursuits and saccades     - CN V  Sensation V1 - V3 intact     - CN VII  Face strong and symmetric bilaterally     - CN VIII  Hearing intact bilaterally     - CN IX, X  Palate raises midline and symmetric     - CN XI  SCM and trapezius 5/5 bilaterally     - CN XII  Tongue midline   * Motor  Muscle bulk normal, strength 5/5 throughout   * Sensory   Intact to light touch   * Coordination  No dysmetria with finger-to-nose or heel-to-shin   * Gait  See below.   * Deep tendon reflexes  3+ and slightly more brisk on the L    Crossadductors    Upgoing toes bilaterally   Maria absent on exam today   1-2 beats clonus bilaterally    * Specialized movement exam  No hypophonic speech.    No facial masking, pleasant and appropriately animated    No cogwheel rigidity, increased tone of the LUE however a lot of difficulty relaxing    L bradykinesia with finger taps, toe taps, heel taps and JENY.    Bilateral postural and action tremor R > L    No tremor at rest    No other dystonia, chorea, athetosis, myoclonus, or tics.   No motor impersistence.   Normal-based gait, cautious gait -- some instability with turns    No shortened stride length.       Laboratory/Radiological:  - Results:  No results displayed because visit has over 200 results.      No results displayed because visit has over 200 results.      Lab Visit on 11/11/2022   Component Date Value Ref Range Status    Group & Rh 11/11/2022 A POS   Final    Indirect Kiley 11/11/2022 NEG   Final   Lab Visit on 11/08/2022   Component Date Value Ref Range Status    Specimen UA 11/08/2022 Urine, Clean Catch   Final    Color, UA 11/08/2022 Yellow  Yellow, Straw, Denise Final    Appearance, UA 11/08/2022 Clear  Clear Final    pH, UA 11/08/2022 5.0  5.0 - 8.0 Final    Specific Gravity, UA 11/08/2022 1.020  1.005 - 1.030 Final    Protein, UA 11/08/2022 Negative  Negative Final    Glucose, UA 11/08/2022 Negative  Negative Final    Ketones, UA 11/08/2022 Negative  Negative Final    Bilirubin (UA) 11/08/2022 Negative  Negative Final    Occult Blood UA 11/08/2022 Negative  Negative Final    Nitrite, UA 11/08/2022 Negative  Negative Final    Leukocytes, UA 11/08/2022 Negative  Negative Final    Urine Culture, Routine 11/08/2022 No significant growth   Final       - Independent review of images:      - Independent review of consultant's notes: Froy Cheek     ASSESSMENT/PLAN:  Tremor   - ET vs enhanced physiologic tremor   - spoke with cardiology regarding switching metoprolol to propranolol, per patient, she was told by psychiatry that there would be no benefit or difference between the two (although metoprolol is cardioselective).   Message sent to cardiology team, they are okay with the switch. Tremor not bothering her at the moment and would like to hold off but okay from cardiology standpoint to switch.       2. Cervical myelopathy   - follows with Dr. Cheek     3. Idiopathic progressive neuropathy   - previously followed by Dr. Mcduffie  - message sent to neuromuscular team to schedule, wanting her to be seen by gen neuro or resident clinic but has not been contacted   - emg from 7/2021 with Dr. Mcduffie suggestive of  radiculopathy vs sensory polyneuropathy   - her neurologic exam is more consistent with myelopathy     4. Imbalance   - multifactorial, cervical DDD, sensory ataxia -- multiple vascular risk factors to include htn, sss, ckd, hld, afib with risk of cerebrovascular disease. Continue to maintain well-controlled vascular risk factors.   - PT now         Orders Placed This Encounter    Vitamin B12 Deficiency Panel    Vitamin B1    Copper, Serum    Zinc    Ambulatory referral/consult to Physical/Occupational Therapy           Follow up: in 6 months with [facility]     Collaborating Physician, Dr. Argueta, was available during today's encounter. Any change to plan along with cosign to appear in the EMR.       Total time spent with the patient: 36 minutes.  25 minutes of face-to-face consultation and 11 minutes of chart review and coordination of care, on the day of the visit. This includes face to face time and non-face to face time preparing to see the patient (eg, review of tests), obtaining and/or reviewing separately obtained history, documenting clinical information in the electronic or other health record, independently interpreting resultsand communicating results to the patient/family/caregiver, or care coordination.         [facility]  Department of Neurology  Movement Disorders

## 2023-02-06 ENCOUNTER — PATIENT MESSAGE (OUTPATIENT)
Dept: NEUROLOGY | Facility: CLINIC | Age: 73
End: 2023-02-06
Payer: MEDICARE

## 2023-02-07 ENCOUNTER — PATIENT MESSAGE (OUTPATIENT)
Dept: NEUROLOGY | Facility: CLINIC | Age: 73
End: 2023-02-07
Payer: MEDICARE

## 2023-02-07 DIAGNOSIS — Z00.00 ENCOUNTER FOR MEDICARE ANNUAL WELLNESS EXAM: ICD-10-CM

## 2023-02-07 RX ORDER — PROPRANOLOL HYDROCHLORIDE 20 MG/1
20 TABLET ORAL 2 TIMES DAILY
Qty: 180 TABLET | Refills: 3 | Status: SHIPPED | OUTPATIENT
Start: 2023-02-07 | End: 2024-03-13

## 2023-02-09 ENCOUNTER — PATIENT MESSAGE (OUTPATIENT)
Dept: NEUROSURGERY | Facility: CLINIC | Age: 73
End: 2023-02-09
Payer: MEDICARE

## 2023-02-09 ENCOUNTER — EXTERNAL HOME HEALTH (OUTPATIENT)
Dept: HOME HEALTH SERVICES | Facility: HOSPITAL | Age: 73
End: 2023-02-09
Payer: MEDICARE

## 2023-02-09 DIAGNOSIS — Z00.00 ENCOUNTER FOR MEDICARE ANNUAL WELLNESS EXAM: ICD-10-CM

## 2023-02-13 ENCOUNTER — CLINICAL SUPPORT (OUTPATIENT)
Dept: REHABILITATION | Facility: HOSPITAL | Age: 73
End: 2023-02-13
Attending: INTERNAL MEDICINE
Payer: MEDICARE

## 2023-02-13 DIAGNOSIS — R26.81 GAIT INSTABILITY: ICD-10-CM

## 2023-02-13 DIAGNOSIS — Z74.09 IMPAIRED FUNCTIONAL MOBILITY, BALANCE, GAIT, AND ENDURANCE: Primary | ICD-10-CM

## 2023-02-13 PROCEDURE — 97162 PT EVAL MOD COMPLEX 30 MIN: CPT | Mod: HCNC,PO

## 2023-02-13 PROCEDURE — 97110 THERAPEUTIC EXERCISES: CPT | Mod: HCNC,PO

## 2023-02-14 DIAGNOSIS — G95.9 CERVICAL MYELOPATHY: Primary | ICD-10-CM

## 2023-02-14 PROBLEM — R26.81 GAIT INSTABILITY: Status: ACTIVE | Noted: 2023-02-14

## 2023-02-14 PROBLEM — Z74.09 IMPAIRED FUNCTIONAL MOBILITY, BALANCE, GAIT, AND ENDURANCE: Status: ACTIVE | Noted: 2023-02-14

## 2023-02-14 NOTE — PLAN OF CARE
[facility]  Physical Therapy Neurological Rehabilitation Initial Evaluation    Name: Manisha Wynne  Clinic Number: [id number]    Therapy Diagnosis:   Encounter Diagnoses   Name Primary?    Gait instability     Impaired functional mobility, balance, gait, and endurance Yes     Physician: Sunitha Rosario P*    Physician Orders: PT Eval and Treat Neuro Physical Therapy   Medical Diagnosis from Referral: R26.81 (ICD-10-CM) - Gait instability  Evaluation Date: 2/13/2023  Authorization Period Expiration: 2/2/24  Plan of Care Expiration: 4/3/23  Visit # / Visits authorized: 1/ 1    Time In: 1:00  Time Out: 1:45  Total Billable Time: 45 minutes    Precautions: Standard, Fall, and pacemaker, recent cervical surgery    Subjective     Medical History:   Past Medical History:   Diagnosis Date    Abnormal Pap smear     Atrial fibrillation     AV block, 1st degree 07/25/2012    C. difficile diarrhea     RESOLVED    Cataract     Depression 07/24/2012    Facet arthritis of lumbar region 03/31/2015    Falls     3 falls in the last 6 mos--noted 6/19/19    Hyperlipidemia     Hypertension 07/24/2012    Neuropathy     Other specified cardiac dysrhythmias(427.89)     Sleep apnea     Syncope 07/24/2012    Tremors of nervous system     hands bilaterally       Surgical History:   Manisha Wynne  has a past surgical history that includes Tonsillectomy; Dilation and curettage of uterus; Cardiac pacemaker placement (09/07/2012); Endoscopic ultrasound of upper gastrointestinal tract (N/A, 7/5/2019); [place] tooth extraction; Nasal septoplasty (N/A, 12/19/2019); Application of cartilage graft (Bilateral, 12/19/2019); Surgical removal of nasal turbinate (Bilateral, 12/19/2019); Open reduction and internal fixation (ORIF) of fracture of distal radius (Left, 1/24/2020); Myelography (N/A, 5/4/2021); Cataract extraction w/  intraocular lens implant (Right, 5/16/2022); Cataract extraction w/   intraocular lens implant (Left, 6/20/2022); Replacement of pacemaker generator (Left, 10/7/2022); and Posterior fusion of cervical spine with laminectomy (N/A, 11/14/2022).    Medications:   Manisha has a current medication list which includes the following prescription(s): acetaminophen, bupropion, calcium-vitamin d3, fluoxetine, gabapentin, lisdexamfetamine, losartan, methyl salicylate-menthol 15-10%, pravastatin, and propranolol.    Allergies:   Review of patient's allergies indicates:  No Known Allergies     Imaging, Xray cervical spine:  Postoperative changes of decompressive laminectomy and posterior instrumented fusion from C3 through C5 redemonstrated.  Hardware appears intact with normal alignment across the arthrodesis.  Diffuse facet arthropathy is present and vertebral body heights are preserved.  Grade 1 anterolisthesis of C7 on T1 is redemonstrated also related to facet arthropathy.    Date of onset: November 2022  History of current condition - Manisha reports: C3-C6 posterior cervical decompression and fusion on 11/14/22 then discharged to a SNF, she has not done outpatient Physical Therapy after surgery. She thinks her doctor recommended it, but does not have an order.  She continues to wear her cervical collar due to increased fear.  She reports balance issues, in 2019 she had 4 falls. She had been doing really well/had been very careful recently but had a recent fall about 2 weeks ago.  She reports she did not hurt her neck.   I have tremors and we are going to switch my blood pressure medication.  She is still wearing the collar because she is nervous about moving her neck too much.      Prior Therapy: went to SNF after surgery. Has done outpatient Physical Therapy for back pain, wrist fracture.  Social History: lives alone takes care of her cats  Falls: 2 weeks ago,  fell walking outside and tripped  DME: Trekking poles, rolling walker  Home Environment: Has to move in 1 month.  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  Slower completing tasks. Independent with ADLs    Pain:  Current 5/10, worst 8/10, best 0/10   Location: bilateral shoulder blades  Description: Aching and Dull  Aggravating Factors: Lifting  Easing Factors: pain medication    Patient's goals: To improve balance and decrease falls    Objective       Objective   Activities Balance Confidence Scale: 61.9% (0-100, lower score indicated decreased balance confidence)    Follows commands: 100% of time   Speech: tangential conversation    Mental status: alert, oriented to person, place, and time, normal mood, behavior, speech, dress, motor activity, and thought processes  Appearance: Casually dressed  Behavior:  cooperative  Attention Span and Concentration:  Easily distracted    Dominant hand:  right     Posture Alignment in standing:   Head: wearing cervical collar   Scapulae: slightly rounded    Sensation: Light Touch: Impaired: bilateral peripheral neuropathy bottom of both feet      Coordination:   - fine motor: not tested  - UE coordination: impaired left upper extremity     - LE coordination:  slowed           RANGE OF MOTION--LOWER EXTREMITIES  (R) LE Hip: normal   Knee: normal   Ankle: normal    (L) LE: Hip: normal   Knee: normal   Ankle: normal    Strength: manual muscle test grades below   Lower Extremity Strength  Right LE  Left LE    Hip Flexion: 4/5 Hip Flexion: 4/5   Hip Extension:  3+/5 Hip Extension: 3+/5   Hip Abduction: 4-/5 Hip Abduction: 4-/5   Hip Adduction: 4/5 Hip Adduction 4/5   Knee Extension: 5/5 Knee Extension: 5/5   Knee Flexion: 4/5 Knee Flexion: 4/5   Ankle Dorsiflexion: 4/5 Ankle Dorsiflexion: 4-/5   Ankle Plantarflexion: NT Ankle Plantarflexion: NT       BALANCE:   Evaluation   Single  Limb Stance R LE 8 sec  (<10 sec = HIGH FALL RISK)   Single Limb Stance L LE 4 sec  (<10 sec = HIGH FALL RISK)   30 second Chair Rise 3 completed with no arms   5 times sit-stand 57 seconds     Postural control:  MCTSIB:  1. Eyes Open/feet together/Firm: 30 seconds  2. Eyes Closed/feet together/Firm: 30 seconds  3. Eyes Open/feet together/Foam: NT  4. Eyes Closed/feet together/Foam: NT      Gait Assessment:   - AD used: none  - Assistance: Independent   - Distance: 200 feet during eval    GAIT DEVIATIONS:  Manisha displays the following deviations with ambulation: wide base of support, path deviation,     Impairments contributing to deviations: impaired motor control, weakness    Endurance Deficit: Fair      Evaluation   Timed Up and Go 13 sec   Self Selected Walking Speed 0.86 m/sec (6m/7s)   Fast Walking Speed NT      Functional Gait Assessment:   1. Gait on level surface =  2   (3) Normal: less than 5.5 sec, no A.D., no imbalance, normal gait pattern, deviates< 6in   (2) Mild impairment: 7-5.6 sec, uses A.D., mild gait deviations, or deviates 6-10 in   (1) Moderate impairment: > 7 sec, slow speed, imbalance, deviates 10-15 in.   (0) Severe impairment: needs assist, deviates >15 in, reach/touch wall  2. Change in Gait Speed = 2   (3) Normal: smooth change w/o loss of balance or gait deviation, deviates < 6 in, significant difference between speeds   (2) Mild impairment: changes speed, but demonstrates mild gait deviations, deviates 6-10 in, OR no deviations but unable to significantly speed, OR uses A.D.   (1) Moderate impairment: minor changes to speed, OR changes speed w/ significant deviations, deviates 10-15 in, OR  Changes speed , but loses balance & recovers   (0) Severe impairment: cannot change speed, deviates >15 in, or loses balance & needs assist  3. Gait with horizontal head turns  = 1   (3) Normal: no change in gait, deviates <6 in   (2) Mild impairment: slight change in speed, deviates 6-10 in, OR  uses A.D.   (1) Moderate impairment: moderate change in speed, deviates 10-15 in   (0) Severe impairment: severe disruption of gait, deviates >15in  4. Gait with vertical head turns = 2   (3) Normal: no change in gait, deviates <6 in   (2) Mild impairment: slight change in speed, deviates 6-10 in OR uses A.D.   (1) Moderate impairment: moderate change in speed, deviates 10-15 in   (0) Severe impairment: severe disruption of gait, deviates >15 in  5. Gait with pivot turns = 2   (3) Normal: performs safely in 3 sec, no LOB   (2) Mild impairment: performs in >3 sec & no LOB, OR turns safely & requires several steps to regain LOB   (1) Moderate impairment: turns slow, OR requires several small steps for balance following turn & stop   (0) Severe impairment: cannot turn safely, needs assist  6. Step over obstacle = 2   (3) Normal: steps over 2 stacked boxes w/o change in speed or LOB   (2) Mild impairment: able to step over 1 box w/o change in speed or LOB   (1) Moderate impairment: steps over 1 box but must slow down, may require VC   (0) Severe impairment: cannot perform w/o assist  7. Gait with Narrow MANOHAR = 0   (3) Normal: 10 steps no staggering   (2) Mild impairment: 7-9 steps   (1) Moderate impairment: 4-7 steps   (0) Severe impairment: < 4 steps or cannot perform w/o assist  8. Gait with eyes closed = 1   (3) Normal: < 7 sec, no A.D., no LOB, normal gait pattern, deviates <6 in   (2) Mild impairment: 7.1-9 sec, mild gait deviations, deviates 6-10 in   (1) Moderate impairment: > 9 sec, abnormal pattern, LOB, deviates 10-15 in   (0) Severe impairment: cannot perform w/o assist, LOB, deviates >15in  9. Ambulating Backwards = 1   (3) Normal: no A.D., no LOB, normal gait pattern, deviates <6in   (2) Mild impairment: uses A.D., slower speed, mild gait deviations, deviates 6-10 in   (1) Moderate impairment: slow speed, abnormal gait pattern, LOB, deviates 10-15 in   (0) Severe impairment: severe gait deviations or LOB,  deviates >15in  10. Steps = 2   (3) Normal: alternating feet, no rail   (2) Mild Impairment: alternating feet, uses rail   (1) Moderate impairment: step-to, uses rail   (0) Severe impairment: cannot perform safely    Score 15/30     Score:   <22/30 fall risk   <20/30 fall risk in older adults   <18/30 fall risk in Parkinsons             CMS Impairment/Limitation/Restriction for FOTO Balance Survey    Therapist reviewed FOTO scores for Manisha Wynne on 2/13/2023.   FOTO documents entered into [employer] - see Media section.    Limitation Score: 54%         TREATMENT   Treatment Time In: 1:37  Treatment Time Out: 1:45  Total Treatment time separate from Evaluation: 8 minutes    Manisha received therapeutic exercises to develop strength, endurance, and posture for 8 minutes including:  Educated on continued walking for aerobic activity and continue lower extremity strengthening exercises.  Perform x 10 sit to stand daily for HEP  Outpatient Physical Therapy will progress strengthening exercises to help improved endurance and balance.  Patient will contact her neurosurgery team to get outpatient referral for her neck after surgery.         Home Exercises and Patient Education Provided    Education provided:   - Physical Therapy Plan of Care, role of Physical Therapy, schedule     Written Home Exercises Provided: not at this time. See above education.  Exercises were reviewed and Manisha was able to demonstrate them prior to the end of the session.  Manisha demonstrated fair  understanding of the education provided.       Assessment   Manisha is a 72 y.o. female referred to outpatient Physical Therapy with a medical diagnosis of gait instability. Patient presents with reports of recent cervical surgery, she continues to wear her cervical collar after it has been discharged due to increased fear of movement.  She demo's bilateral lower extremity weakness as well as impaired sensation in bilateral lower extremity due  to peripheral neuropathy.  She was able to perform single leg stance for less than 10 seconds on both legs indicating impaired static balance. She scored 57 seconds on the 5 times sit to stand indicating impaired transfers and functional strength.  She scored 15/30 on the FGA indicating increased fall risk and impaired dynamic balance.  Patient reports she will be reaching out to her physician for referral for outpatient therapy to focus on her neck.  She will benefit from Neuro Physical Therapy to address balance, gait and lower extremity strength and endurance to prevent falls as well as maximize independence.     Patient prognosis is Fair.   Patient will benefit from skilled outpatient Physical Therapy to address the deficits stated above and in the chart below, provide patient/family education, and to maximize patient's level of independence.     Plan of care discussed with patient: Yes  Patient's spiritual, cultural and educational needs considered and patient is agreeable to the plan of care and goals as stated below:     Anticipated Barriers for therapy: co morbidities     Medical Necessity is demonstrated by the following  History  Co-morbidities and personal factors that may impact the plan of care Co-morbidities:   CAD, depression, and Afib, pacemaker, cervical fusion, neuropathy, HTN, syncope, sleep apnea    Personal Factors:   Fear after recent surgery   high   Examination  Body Structures and Functions, activity limitations and participation restrictions that may impact the plan of care Body Regions:   neck  back  lower extremities    Body Systems:    ROM  strength  gross coordinated movement  balance  gait  transfers  motor control  motor learning    Participation Restrictions:   Fear after recent surgery - wearing cervical collar    Activity limitations:   Learning and applying knowledge  no deficits    General Tasks and Commands  no deficits    Communication  no deficits    Mobility  lifting and  carrying objects  walking  using transportation (bus, train, plane, car)  Transfers, dynamic balance    Self care  no deficits    Domestic Life  shopping  doing house work (cleaning house, washing dishes, laundry)    Interactions/Relationships  no deficits    Life Areas  basic economic transactions    Community and Social Life  recreation and leisure         moderate   Clinical Presentation evolving clinical presentation with changing clinical characteristics moderate   Decision Making/ Complexity Score: moderate     Goals:  Short Term Goals: 6 weeks   Patient to perform HEP Independent   Patient to score less than or equal to 45 sec on the 5 times sit to stand for improved transfers and functional strength  Patient to perform 20 min of aerobic activity atleast 3 times per week for improved endurance    Long Term Goals: 12 weeks   Patient to score greater than or equal 18/30 on the FGA for decreased fall risk  Patient to improved bilateral hip ext strength to 4/5 for improved core stability and improved standing  Patient to score greater than or equal to 68% on the ABC for improved balance confidence  Patient to score less than or equal to 11 sec on the TUG for improved gait and balance    Plan   Plan of care Certification: 2/13/2023 to 5/13/23.    Outpatient Physical Therapy 2 times weekly for 12 weeks to include the following interventions: Gait Training, Manual Therapy, Moist Heat/ Ice, Neuromuscular Re-ed, Patient Education, Self Care, Therapeutic Activities, Therapeutic Exercise, and balance.     Sandra Rivera, PT

## 2023-02-21 ENCOUNTER — PATIENT MESSAGE (OUTPATIENT)
Dept: HEMATOLOGY/ONCOLOGY | Facility: CLINIC | Age: 73
End: 2023-02-21
Payer: MEDICARE

## 2023-02-21 ENCOUNTER — PATIENT MESSAGE (OUTPATIENT)
Dept: INTERNAL MEDICINE | Facility: CLINIC | Age: 73
End: 2023-02-21
Payer: MEDICARE

## 2023-02-23 ENCOUNTER — INFUSION (OUTPATIENT)
Dept: INFECTIOUS DISEASES | Facility: HOSPITAL | Age: 73
End: 2023-02-23
Attending: INTERNAL MEDICINE
Payer: MEDICARE

## 2023-02-23 VITALS
TEMPERATURE: 97 F | RESPIRATION RATE: 18 BRPM | OXYGEN SATURATION: 96 % | DIASTOLIC BLOOD PRESSURE: 58 MMHG | BODY MASS INDEX: 23.85 KG/M2 | HEART RATE: 68 BPM | SYSTOLIC BLOOD PRESSURE: 125 MMHG | WEIGHT: 156.88 LBS

## 2023-02-23 DIAGNOSIS — M81.8 OTHER OSTEOPOROSIS WITHOUT CURRENT PATHOLOGICAL FRACTURE: Primary | ICD-10-CM

## 2023-02-23 DIAGNOSIS — N18.30 STAGE 3 CHRONIC KIDNEY DISEASE, UNSPECIFIED WHETHER STAGE 3A OR 3B CKD: ICD-10-CM

## 2023-02-23 PROCEDURE — 96372 THER/PROPH/DIAG INJ SC/IM: CPT | Mod: HCNC

## 2023-02-23 PROCEDURE — 63600175 PHARM REV CODE 636 W HCPCS: Mod: JG,HCNC | Performed by: INTERNAL MEDICINE

## 2023-02-23 RX ADMIN — DENOSUMAB 60 MG: 60 INJECTION SUBCUTANEOUS at 09:02

## 2023-02-24 ENCOUNTER — CLINICAL SUPPORT (OUTPATIENT)
Dept: REHABILITATION | Facility: HOSPITAL | Age: 73
End: 2023-02-24
Attending: NEUROLOGICAL SURGERY
Payer: MEDICARE

## 2023-02-24 DIAGNOSIS — G95.9 CERVICAL MYELOPATHY: Primary | ICD-10-CM

## 2023-02-24 PROCEDURE — 97530 THERAPEUTIC ACTIVITIES: CPT | Mod: HCNC,PO

## 2023-02-24 PROCEDURE — 97163 PT EVAL HIGH COMPLEX 45 MIN: CPT | Mod: HCNC,PO

## 2023-02-24 PROCEDURE — 97110 THERAPEUTIC EXERCISES: CPT | Mod: HCNC,PO

## 2023-02-24 NOTE — PROGRESS NOTES
"[facility]   Physical Therapy Initial Evaluation     Date: 2/24/2023   Name: Manisha Wynne  Clinic Number: [id number]    Therapy Diagnosis: No diagnosis found.  Physician: Nicolette Cheek MD    Physician Orders: PT Eval and Treat  Medical Diagnosis from Referral:  G95.9 (ICD-10-CM) - Cervical myelopathy  Evaluation Date: 2/24/2023  Authorization Period Expiration: 03/24/2023  Plan of Care Expiration: 04/24/2023  Progress Note Due: 03/24/2023  Visit # / Visits authorized: 1/1   FOTO: 1/3    Precautions: Standard and Fall     Time In: 9:00 am  Time Out: 10:10 am  Total Appointment Time (timed & untimed codes): 70 minutes      SUBJECTIVE     Date of onset: Onset of neck pain following C3-C6 posterior cervical decompression and fusion surgery performed on 11/14/2022.    History of current condition - Manisha reports onset of bilateral neck, shoulder blade, and upper arm pain (right worse than left) which began following a cervical decompression and fusion surgery she had on 11/14/2022 due to cervical myelopathy. The patient states that she feels her head is "sitting forward" with regards to her posture, and believes that she needs to "put my head backwards" in order to feel like she is standing up straight. She reports intermittent numbness in the first two (2) digits of her right hand, and reports difficulty with opening jars and occasionally dropping objects throughout the day, though this has improved slightly since surgery. Manisha also reports the occurrence of intermittent headaches "once or twice every couple weeks" that encompass her entire head, and which she feels are related to her neck issues. Functionally she reports difficulty/pain with turning her head while driving, which is her biggest concern.    Falls: Two (2), forwards direction due to tripping, and able to get up from the ground with assistance.    Prior Therapy: Yes, currently being seen for balance deficits in " neuro clinic, prior PT for multiple orthopedic issues.  Social History: Lives alone in a single story home with nine (9) steps to enter with bilateral handrails.  Occupation: PRN  at [employer]  Prior Level of Function: No neck pain.  Current Level of Function: Intermittent bilateral neck, scapular, and arm pain.    Pain:  Current 6/10, worst 9/10, best 0/10   Location: Right sided neck, scapular, and arm pain.  Description: Tight, Tingling, Sharp, and Variable  Aggravating Factors: With activity as the day progresses, prolonged sitting, looking overhead, turning head to either side.  Easing Factors: Standing and walking, Tylenol, and lying supine.    Patients Goals: To improve upright posture, core strength, and be able to turn her head again.     Medical History:   Past Medical History:   Diagnosis Date    Abnormal Pap smear     Atrial fibrillation     AV block, 1st degree 07/25/2012    C. difficile diarrhea     RESOLVED    Cataract     Depression 07/24/2012    Facet arthritis of lumbar region 03/31/2015    Falls     3 falls in the last 6 mos--noted 6/19/19    Hyperlipidemia     Hypertension 07/24/2012    Neuropathy     Other specified cardiac dysrhythmias(427.89)     Sleep apnea     Syncope 07/24/2012    Tremors of nervous system     hands bilaterally       Surgical History:   Manisha Wynne  has a past surgical history that includes Tonsillectomy; Dilation and curettage of uterus; Cardiac pacemaker placement (09/07/2012); Endoscopic ultrasound of upper gastrointestinal tract (N/A, 7/5/2019); [place] tooth extraction; Nasal septoplasty (N/A, 12/19/2019); Application of cartilage graft (Bilateral, 12/19/2019); Surgical removal of nasal turbinate (Bilateral, 12/19/2019); Open reduction and internal fixation (ORIF) of fracture of distal radius (Left, 1/24/2020); Myelography (N/A, 5/4/2021); Cataract extraction w/  intraocular lens implant (Right, 5/16/2022); Cataract extraction w/   intraocular lens implant (Left, 6/20/2022); Replacement of pacemaker generator (Left, 10/7/2022); and Posterior fusion of cervical spine with laminectomy (N/A, 11/14/2022).    Medications:   Manisha has a current medication list which includes the following prescription(s): acetaminophen, bupropion, calcium-vitamin d3, fluoxetine, gabapentin, lisdexamfetamine, losartan, methyl salicylate-menthol 15-10%, pravastatin, and propranolol.    Allergies:   Review of patient's allergies indicates:  No Known Allergies     OBJECTIVE     Observation: Severe forward head posture, unsteady gait when walking into clinic, well healed surgical incision along posterior aspect of cervical spine sitting to right of lower cervical and upper thoracic spine. Appears fidgety in chair during subjective portion of examination.    Posture: Left shoulder girdle elevated in comparison to right, stands with sway back posture, increased thoracic kyphosis, and severe forward head posture. Moderate left sided scoliosis in upper thoracic spine, with head sitting off center to right when viewed posteriorly.    Cervical Range of Motion:    Degrees   Flexion 30   Extension 37   Right Rotation 35*   Left Rotation 25*   Right Side Bend 20   Left Side Bend 20   * = pain    Dermatomal Sensation - Light Touch:   Right Upper Extremity Left Upper Extremity   C3 Normal Normal   C4 Normal Normal   C5 Normal Normal   C6 Diminished Normal   C7 Normal Normal   C8 Normal Normal   T1 Normal Normal     Deep Tendon Reflexes:   Right Upper Extremity Left Upper Extremity   Biceps Brachii (C5/C6) 3+ 3+   Triceps Brachii (C7) 3+ 3+     Shoulder Range of Motion:    Right Upper Extremity Left Upper Extremity   Flexion WFL WFL   Abduction WFL WFL   External Rotation WFL WFL   Internal Rotation WFL WFL   *WFL = within functional limits    Upper Extremity Strength/Myotomal Strength Testing:   Right Upper Extremity Left Upper Extremity   Shoulder Flexion 4+/5 4+/5   Shoulder  Abduction 3+/5 3+/5   Shoulder Extension 5/5 5/5   Shoulder External Rotation 4+/5 4+/5   Shoulder Internal Rotation 4+/5 4+/5   Elbow Flexion 4+/5 4+/5   Elbow Extension 4/5 4/5     Special Tests:  Maria's Sign - negative bilaterally  Upper Extremity Clonus - negative bilaterally    Palpation: 1+ TTP lower cervical spinous processes.    Limitation/Restriction for FOTO Neck Survey    Therapist reviewed FOTO scores for Manisha Wynne on 2/24/2023.   FOTO documents entered into [employer] - see Media section.    Limitation Score: 59%       TREATMENT     Total Treatment time (time-based codes) separate from Evaluation: 25 minutes      Manisha received the treatments listed below:      Therapeutic Exercise to develop strength, endurance, ROM, and posture for 8 minutes including:  Chin Tucks: 2x10 with 3 second hold, supine with cervical support roll    Therapeutic Activities to improve functional performance for 17  minutes, including:  Patient Education (See Below)    PATIENT EDUCATION AND HOME EXERCISES     Education provided:   - Initial HEP  - Sleep posture with cervical towel roll and axillary pillow support  - Use of moist heat for pain relief and to improve flexibility prior to HEP performance    Written Home Exercises Provided:  Yes . Exercises were reviewed and Manisha was able to demonstrate them prior to the end of the session.  Manisha demonstrated fair  understanding of the education provided. See EMR under Patient Instructions for exercises provided during therapy sessions.    ASSESSMENT     Manisha is a 72 y.o. female referred to outpatient Physical Therapy with a medical diagnosis of:  G95.9 (ICD-10-CM) - Cervical myelopathy    Patient presents with poor posture with severe forward head, left sided upper thoracic scoliosis, decreased cervical spine AROM in all directions, decreased bilateral upper extremity strength, diminished sensation to light touch along left C6 dermatome, and hyperreflexia of  bilateral upper extremity deep tendon reflexes.    Patient prognosis is Fair.   Patient will benefit from skilled outpatient Physical Therapy to address the deficits stated above and in the chart below, provide patient /family education, and to maximize patientt's level of independence.     Plan of care discussed with patient: Yes  Patient's spiritual, cultural and educational needs considered and patient is agreeable to the plan of care and goals as stated below:     Anticipated Barriers for therapy: scheduling, compliance, complex medical history, psychosocial factors    Medical Necessity is demonstrated by the following  History  Co-morbidities and personal factors that may impact the plan of care Co-morbidities:    Past Medical History:   Diagnosis Date    Abnormal Pap smear     Atrial fibrillation     AV block, 1st degree 07/25/2012    C. difficile diarrhea     RESOLVED    Cataract     Depression 07/24/2012    Facet arthritis of lumbar region 03/31/2015    Falls     3 falls in the last 6 mos--noted 6/19/19    Hyperlipidemia     Hypertension 07/24/2012    Neuropathy     Other specified cardiac dysrhythmias(427.89)     Sleep apnea     Syncope 07/24/2012    Tremors of nervous system     hands bilaterally     Personal Factors:   age  coping style     high   Examination  Body Structures and Functions, activity limitations and participation restrictions that may impact the plan of care Body Regions:   neck  upper extremities    Body Systems:    gross symmetry  ROM  strength  balance  gait  motor control    Participation Restrictions:   Decreased endurance and balance impairments.    Activity limitations:   Learning and applying knowledge  no deficits    General Tasks and Commands  no deficits    Communication  no deficits    Mobility  lifting and carrying objects  fine hand use (grasping/picking up)  walking    Self care  no deficits    Domestic Life  doing house work (cleaning house, washing dishes,  laundry)    Interactions/Relationships  no deficits    Life Areas  no deficits    Community and Social Life  no deficits         high   Clinical Presentation stable and uncomplicated low   Decision Making/ Complexity Score: high     Goals:  Short Term Goals: 4 weeks   Patient will be independent with initial HEP to improve therapy outcomes  Patient will report consistently sleeping with cervical support roll in single pillow at night to improve sleep quality  Patient will improve bilateral cervical spine AROM by >/=5 degrees to demonstrate improved cervical spine health  Patient will report neck pain at rest as </=4/10 to demonstrate improved quality of life    Long Term Goals: 8 weeks   Patient will be independent with final HEP to maintain long term gains achieved in physical therapy  Patient will report neck pain at rest as </=2/10 to demonstrate improved quality of life  Patient will improve FOTO neck score to </=50% to demonstrate an improved in perceived functional status  Patient will improve bilateral cervical rotation AROM to >/=45 degrees to be able to turn her head while reversing her car    PLAN   Plan of care Certification: 2/24/2023 to 04/24/2023.    Outpatient Physical Therapy 2 times weekly for 8 weeks to include the following interventions: Cervical/Lumbar Traction, Gait Training, Manual Therapy, Moist Heat/ Ice, Neuromuscular Re-ed, Patient Education, Self Care, Therapeutic Activities, Therapeutic Exercise, and Ultrasound.     Lenny Delgado, PT, DPT    I CERTIFY THE NEED FOR THESE SERVICES FURNISHED UNDER THIS PLAN OF TREATMENT AND WHILE UNDER MY CARE   Physician's comments:     Physician's Signature: ___________________________________________________

## 2023-02-27 ENCOUNTER — LAB VISIT (OUTPATIENT)
Dept: LAB | Facility: HOSPITAL | Age: 73
End: 2023-02-27
Payer: MEDICARE

## 2023-02-27 DIAGNOSIS — D89.89 LIGHT CHAIN DISEASE, KAPPA TYPE: ICD-10-CM

## 2023-02-27 LAB
ALBUMIN SERPL BCP-MCNC: 3.7 G/DL (ref 3.5–5.2)
ALP SERPL-CCNC: 50 U/L (ref 55–135)
ALT SERPL W/O P-5'-P-CCNC: 21 U/L (ref 10–44)
ANION GAP SERPL CALC-SCNC: 5 MMOL/L (ref 8–16)
AST SERPL-CCNC: 19 U/L (ref 10–40)
BASOPHILS # BLD AUTO: 0.03 K/UL (ref 0–0.2)
BASOPHILS NFR BLD: 0.5 % (ref 0–1.9)
BILIRUB SERPL-MCNC: 0.7 MG/DL (ref 0.1–1)
BUN SERPL-MCNC: 19 MG/DL (ref 8–23)
CALCIUM SERPL-MCNC: 9.3 MG/DL (ref 8.7–10.5)
CHLORIDE SERPL-SCNC: 106 MMOL/L (ref 95–110)
CO2 SERPL-SCNC: 26 MMOL/L (ref 23–29)
CREAT SERPL-MCNC: 1.1 MG/DL (ref 0.5–1.4)
DIFFERENTIAL METHOD: ABNORMAL
EOSINOPHIL # BLD AUTO: 0.2 K/UL (ref 0–0.5)
EOSINOPHIL NFR BLD: 2.4 % (ref 0–8)
ERYTHROCYTE [DISTWIDTH] IN BLOOD BY AUTOMATED COUNT: 13 % (ref 11.5–14.5)
EST. GFR  (NO RACE VARIABLE): 52.7 ML/MIN/1.73 M^2
GLUCOSE SERPL-MCNC: 112 MG/DL (ref 70–110)
HCT VFR BLD AUTO: 33.1 % (ref 37–48.5)
HGB BLD-MCNC: 10.7 G/DL (ref 12–16)
IMM GRANULOCYTES # BLD AUTO: 0.02 K/UL (ref 0–0.04)
IMM GRANULOCYTES NFR BLD AUTO: 0.3 % (ref 0–0.5)
LYMPHOCYTES # BLD AUTO: 2 K/UL (ref 1–4.8)
LYMPHOCYTES NFR BLD: 30.7 % (ref 18–48)
MCH RBC QN AUTO: 29.9 PG (ref 27–31)
MCHC RBC AUTO-ENTMCNC: 32.3 G/DL (ref 32–36)
MCV RBC AUTO: 93 FL (ref 82–98)
MONOCYTES # BLD AUTO: 0.7 K/UL (ref 0.3–1)
MONOCYTES NFR BLD: 10.4 % (ref 4–15)
NEUTROPHILS # BLD AUTO: 3.7 K/UL (ref 1.8–7.7)
NEUTROPHILS NFR BLD: 55.7 % (ref 38–73)
NRBC BLD-RTO: 0 /100 WBC
PLATELET # BLD AUTO: 149 K/UL (ref 150–450)
PMV BLD AUTO: 10.4 FL (ref 9.2–12.9)
POTASSIUM SERPL-SCNC: 4.2 MMOL/L (ref 3.5–5.1)
PROT SERPL-MCNC: 6.7 G/DL (ref 6–8.4)
RBC # BLD AUTO: 3.58 M/UL (ref 4–5.4)
SODIUM SERPL-SCNC: 137 MMOL/L (ref 136–145)
WBC # BLD AUTO: 6.57 K/UL (ref 3.9–12.7)

## 2023-02-27 PROCEDURE — 80053 COMPREHEN METABOLIC PANEL: CPT | Mod: HCNC | Performed by: STUDENT IN AN ORGANIZED HEALTH CARE EDUCATION/TRAINING PROGRAM

## 2023-02-27 PROCEDURE — 36415 COLL VENOUS BLD VENIPUNCTURE: CPT | Mod: HCNC | Performed by: STUDENT IN AN ORGANIZED HEALTH CARE EDUCATION/TRAINING PROGRAM

## 2023-02-27 PROCEDURE — 85025 COMPLETE CBC W/AUTO DIFF WBC: CPT | Mod: HCNC | Performed by: STUDENT IN AN ORGANIZED HEALTH CARE EDUCATION/TRAINING PROGRAM

## 2023-03-01 ENCOUNTER — CLINICAL SUPPORT (OUTPATIENT)
Dept: OTHER | Facility: CLINIC | Age: 73
End: 2023-03-01
Payer: MEDICARE

## 2023-03-01 ENCOUNTER — PES CALL (OUTPATIENT)
Dept: ADMINISTRATIVE | Facility: CLINIC | Age: 73
End: 2023-03-01
Payer: MEDICARE

## 2023-03-01 DIAGNOSIS — Z00.8 ENCOUNTER FOR OTHER GENERAL EXAMINATION: ICD-10-CM

## 2023-03-02 ENCOUNTER — DOCUMENTATION ONLY (OUTPATIENT)
Dept: REHABILITATION | Facility: HOSPITAL | Age: 73
End: 2023-03-02
Payer: MEDICARE

## 2023-03-02 ENCOUNTER — PES CALL (OUTPATIENT)
Dept: ADMINISTRATIVE | Facility: CLINIC | Age: 73
End: 2023-03-02
Payer: MEDICARE

## 2023-03-02 NOTE — PROGRESS NOTES
Face to face meeting completed with Sandra Rivera PT regarding current status and progress of   Manisha Wynne .  Uche Gipson, PTA

## 2023-03-03 ENCOUNTER — OFFICE VISIT (OUTPATIENT)
Dept: HEMATOLOGY/ONCOLOGY | Facility: CLINIC | Age: 73
End: 2023-03-03
Payer: MEDICARE

## 2023-03-03 VITALS
RESPIRATION RATE: 12 BRPM | WEIGHT: 163.13 LBS | TEMPERATURE: 98 F | HEART RATE: 64 BPM | HEIGHT: 68 IN | DIASTOLIC BLOOD PRESSURE: 58 MMHG | BODY MASS INDEX: 24.72 KG/M2 | OXYGEN SATURATION: 98 % | SYSTOLIC BLOOD PRESSURE: 125 MMHG

## 2023-03-03 VITALS
WEIGHT: 160 LBS | HEIGHT: 68 IN | SYSTOLIC BLOOD PRESSURE: 126 MMHG | DIASTOLIC BLOOD PRESSURE: 78 MMHG | BODY MASS INDEX: 24.25 KG/M2

## 2023-03-03 DIAGNOSIS — D64.9 NORMOCYTIC ANEMIA: ICD-10-CM

## 2023-03-03 DIAGNOSIS — N18.31 STAGE 3A CHRONIC KIDNEY DISEASE: ICD-10-CM

## 2023-03-03 DIAGNOSIS — D69.6 THROMBOCYTOPENIA: ICD-10-CM

## 2023-03-03 DIAGNOSIS — D89.89 LIGHT CHAIN DISEASE, KAPPA TYPE: Primary | ICD-10-CM

## 2023-03-03 DIAGNOSIS — E53.8 LOW SERUM VITAMIN B12: ICD-10-CM

## 2023-03-03 DIAGNOSIS — R60.0 BILATERAL LEG EDEMA: ICD-10-CM

## 2023-03-03 LAB
GLUCOSE SERPL-MCNC: 97 MG/DL (ref 60–140)
HDLC SERPL-MCNC: 72 MG/DL
POC CHOLESTEROL, LDL (DOCK): 75 MG/DL
POC CHOLESTEROL, TOTAL: 164 MG/DL
TRIGL SERPL-MCNC: 91 MG/DL

## 2023-03-03 PROCEDURE — 1125F PR PAIN SEVERITY QUANTIFIED, PAIN PRESENT: ICD-10-PCS | Mod: HCNC,CPTII,S$GLB, | Performed by: STUDENT IN AN ORGANIZED HEALTH CARE EDUCATION/TRAINING PROGRAM

## 2023-03-03 PROCEDURE — 3074F PR MOST RECENT SYSTOLIC BLOOD PRESSURE < 130 MM HG: ICD-10-PCS | Mod: HCNC,CPTII,S$GLB, | Performed by: STUDENT IN AN ORGANIZED HEALTH CARE EDUCATION/TRAINING PROGRAM

## 2023-03-03 PROCEDURE — 3008F BODY MASS INDEX DOCD: CPT | Mod: HCNC,CPTII,S$GLB, | Performed by: STUDENT IN AN ORGANIZED HEALTH CARE EDUCATION/TRAINING PROGRAM

## 2023-03-03 PROCEDURE — 3074F SYST BP LT 130 MM HG: CPT | Mod: HCNC,CPTII,S$GLB, | Performed by: STUDENT IN AN ORGANIZED HEALTH CARE EDUCATION/TRAINING PROGRAM

## 2023-03-03 PROCEDURE — 3078F PR MOST RECENT DIASTOLIC BLOOD PRESSURE < 80 MM HG: ICD-10-PCS | Mod: HCNC,CPTII,S$GLB, | Performed by: STUDENT IN AN ORGANIZED HEALTH CARE EDUCATION/TRAINING PROGRAM

## 2023-03-03 PROCEDURE — 4010F ACE/ARB THERAPY RXD/TAKEN: CPT | Mod: HCNC,CPTII,S$GLB, | Performed by: STUDENT IN AN ORGANIZED HEALTH CARE EDUCATION/TRAINING PROGRAM

## 2023-03-03 PROCEDURE — 4010F PR ACE/ARB THEARPY RXD/TAKEN: ICD-10-PCS | Mod: HCNC,CPTII,S$GLB, | Performed by: STUDENT IN AN ORGANIZED HEALTH CARE EDUCATION/TRAINING PROGRAM

## 2023-03-03 PROCEDURE — 1125F AMNT PAIN NOTED PAIN PRSNT: CPT | Mod: HCNC,CPTII,S$GLB, | Performed by: STUDENT IN AN ORGANIZED HEALTH CARE EDUCATION/TRAINING PROGRAM

## 2023-03-03 PROCEDURE — 3288F PR FALLS RISK ASSESSMENT DOCUMENTED: ICD-10-PCS | Mod: HCNC,CPTII,S$GLB, | Performed by: STUDENT IN AN ORGANIZED HEALTH CARE EDUCATION/TRAINING PROGRAM

## 2023-03-03 PROCEDURE — 1101F PR PT FALLS ASSESS DOC 0-1 FALLS W/OUT INJ PAST YR: ICD-10-PCS | Mod: HCNC,CPTII,S$GLB, | Performed by: STUDENT IN AN ORGANIZED HEALTH CARE EDUCATION/TRAINING PROGRAM

## 2023-03-03 PROCEDURE — 99999 PR PBB SHADOW E&M-EST. PATIENT-LVL IV: CPT | Mod: PBBFAC,HCNC,, | Performed by: STUDENT IN AN ORGANIZED HEALTH CARE EDUCATION/TRAINING PROGRAM

## 2023-03-03 PROCEDURE — 1159F MED LIST DOCD IN RCRD: CPT | Mod: HCNC,CPTII,S$GLB, | Performed by: STUDENT IN AN ORGANIZED HEALTH CARE EDUCATION/TRAINING PROGRAM

## 2023-03-03 PROCEDURE — 3008F PR BODY MASS INDEX (BMI) DOCUMENTED: ICD-10-PCS | Mod: HCNC,CPTII,S$GLB, | Performed by: STUDENT IN AN ORGANIZED HEALTH CARE EDUCATION/TRAINING PROGRAM

## 2023-03-03 PROCEDURE — 3078F DIAST BP <80 MM HG: CPT | Mod: HCNC,CPTII,S$GLB, | Performed by: STUDENT IN AN ORGANIZED HEALTH CARE EDUCATION/TRAINING PROGRAM

## 2023-03-03 PROCEDURE — 99214 OFFICE O/P EST MOD 30 MIN: CPT | Mod: HCNC,S$GLB,, | Performed by: STUDENT IN AN ORGANIZED HEALTH CARE EDUCATION/TRAINING PROGRAM

## 2023-03-03 PROCEDURE — 99214 PR OFFICE/OUTPT VISIT, EST, LEVL IV, 30-39 MIN: ICD-10-PCS | Mod: HCNC,S$GLB,, | Performed by: STUDENT IN AN ORGANIZED HEALTH CARE EDUCATION/TRAINING PROGRAM

## 2023-03-03 PROCEDURE — 1101F PT FALLS ASSESS-DOCD LE1/YR: CPT | Mod: HCNC,CPTII,S$GLB, | Performed by: STUDENT IN AN ORGANIZED HEALTH CARE EDUCATION/TRAINING PROGRAM

## 2023-03-03 PROCEDURE — 3288F FALL RISK ASSESSMENT DOCD: CPT | Mod: HCNC,CPTII,S$GLB, | Performed by: STUDENT IN AN ORGANIZED HEALTH CARE EDUCATION/TRAINING PROGRAM

## 2023-03-03 PROCEDURE — 1159F PR MEDICATION LIST DOCUMENTED IN MEDICAL RECORD: ICD-10-PCS | Mod: HCNC,CPTII,S$GLB, | Performed by: STUDENT IN AN ORGANIZED HEALTH CARE EDUCATION/TRAINING PROGRAM

## 2023-03-03 PROCEDURE — 1157F ADVNC CARE PLAN IN RCRD: CPT | Mod: HCNC,CPTII,S$GLB, | Performed by: STUDENT IN AN ORGANIZED HEALTH CARE EDUCATION/TRAINING PROGRAM

## 2023-03-03 PROCEDURE — 1157F PR ADVANCE CARE PLAN OR EQUIV PRESENT IN MEDICAL RECORD: ICD-10-PCS | Mod: HCNC,CPTII,S$GLB, | Performed by: STUDENT IN AN ORGANIZED HEALTH CARE EDUCATION/TRAINING PROGRAM

## 2023-03-03 PROCEDURE — 99999 PR PBB SHADOW E&M-EST. PATIENT-LVL IV: ICD-10-PCS | Mod: PBBFAC,HCNC,, | Performed by: STUDENT IN AN ORGANIZED HEALTH CARE EDUCATION/TRAINING PROGRAM

## 2023-03-03 RX ORDER — LANOLIN ALCOHOL/MO/W.PET/CERES
100 CREAM (GRAM) TOPICAL DAILY
Status: ON HOLD | COMMUNITY
End: 2024-02-25 | Stop reason: HOSPADM

## 2023-03-03 NOTE — PROGRESS NOTES
Hematology- Oncology Clinic Note :       RFV / chief complaint- Light chain disease, kappa type and Follow-up (/.0)        HPI  Pt is a 72 y.o. female who  has a past medical history of Abnormal Pap smear, Atrial fibrillation, AV block, 1st degree (07/25/2012), C. difficile diarrhea, Cataract, Depression (07/24/2012), Facet arthritis of lumbar region (03/31/2015), Falls, Hyperlipidemia, Hypertension (07/24/2012), Neuropathy, Other specified cardiac dysrhythmias(427.89), Sleep apnea, Syncope (07/24/2012), and Tremors of nervous system.   Pt presents to the clinic today for abnormal light chain levels     Doing better. Diarrhea resolved.     Neuropathy- in feet, has had falls.   BLE swelling + chronic, stable     Other medical issues- HTN, HLD, has pacemaker in place       Reviewed past medical/surgical/social history    Past Medical History:   Diagnosis Date    Abnormal Pap smear     Atrial fibrillation     AV block, 1st degree 07/25/2012    C. difficile diarrhea     RESOLVED    Cataract     Depression 07/24/2012    Facet arthritis of lumbar region 03/31/2015    Falls     3 falls in the last 6 mos--noted 6/19/19    Hyperlipidemia     Hypertension 07/24/2012    Neuropathy     Other specified cardiac dysrhythmias(427.89)     Sleep apnea     Syncope 07/24/2012    Tremors of nervous system     hands bilaterally      Past Surgical History:   Procedure Laterality Date    APPLICATION OF CARTILAGE GRAFT Bilateral 12/19/2019    Procedure: APPLICATION, CARTILAGE GRAFT AURICULAR JEZ;  Surgeon: Martinez Flores III, MD;  Location: [facility];  Service: ENT;  Laterality: Bilateral;    CARDIAC PACEMAKER PLACEMENT  09/07/2012    [id number]    CATARACT EXTRACTION W/  INTRAOCULAR LENS IMPLANT Right 5/16/2022    Procedure: EXTRACTION, CATARACT, WITH IOL INSERTION;  Surgeon: Ines Aguilera MD;  Location: [facility];  Service: Ophthalmology;  Laterality: Right;  Catalys     CATARACT EXTRACTION W/  INTRAOCULAR LENS  IMPLANT Left 6/20/2022    Procedure: EXTRACTION, CATARACT, WITH IOL INSERTION;  Surgeon: Ines Aguilera MD;  Location: [facility] OR;  Service: Ophthalmology;  Laterality: Left;  Catalys     DILATION AND CURETTAGE OF UTERUS      Hx of precancerous cells?    ENDOSCOPIC ULTRASOUND OF UPPER GASTROINTESTINAL TRACT N/A 7/5/2019    Procedure: ULTRASOUND, UPPER GI TRACT, ENDOSCOPIC;  Surgeon: Kev Calderon MD;  Location: [facility] ENDO (2ND FLR);  Service: Endoscopy;  Laterality: N/A;  Pacemaker-Adam   appt confirmed-rb    MYELOGRAPHY N/A 5/4/2021    Procedure: Myelogram  CERVICAL, THORACIC AND LUMBAR;  Surgeon: [facility] Diagnostic Provider;  Location: [facility] OR 2ND FLR;  Service: Radiology;  Laterality: N/A;    NASAL SEPTOPLASTY N/A 12/19/2019    Procedure: SEPTOPLASTY, NOSE;  Surgeon: Martinez Flores III, MD;  Location: [facility] OR;  Service: ENT;  Laterality: N/A;  FOLLOW DR SALVATORE KIMBROUGH PROTOCOL    OPEN REDUCTION AND INTERNAL FIXATION (ORIF) OF FRACTURE OF DISTAL RADIUS Left 1/24/2020    Procedure: ORIF, FRACTURE, RADIUS, DISTAL-left;  Surgeon: Ellen Moe MD;  Location: [facility] OR;  Service: Orthopedics;  Laterality: Left;    POSTERIOR FUSION OF CERVICAL SPINE WITH LAMINECTOMY N/A 11/14/2022    Procedure: LAMINECTOMY, SPINE, CERVICAL, WITH POSTERIOR FUSION C3-C6;  Surgeon: Nicolette Cheek MD;  Location: [facility] OR 2ND FLR;  Service: Neurosurgery;  Laterality: N/A;  TORONTO III, ASA III, BLOOD TYPE AND SCREEN, NEUROMONITORING EMG SEP MEP, BRACE/HALO [place], BED REGULAR BED, HEADREST [place], POSITION PRONE, SPECIAL EQUIPMENT NIKI MIDDLETON, RADIOLOGY C-ARM, EXT. BONE STIMULATOR BIOMET    REPLACEMENT OF PACEMAKER GENERATOR Left 10/7/2022    Procedure: REPLACEMENT, PACEMAKER GENERATOR;  Surgeon: John Sheets MD;  Location: [facility] EP LAB;  Service: Cardiology;  Laterality: Left;  YAS, SSS, AVB, Dual PPM Gen Change,SJM, MAC ,HI, 3 prep,** Adam dcPPM in situ**    SURGICAL REMOVAL OF NASAL TURBINATE Bilateral 12/19/2019     Procedure: EXCISION, NASAL TURBINATE;  Surgeon: Martinez Flores III, MD;  Location: [facility];  Service: ENT;  Laterality: Bilateral;    TONSILLECTOMY      WISDOM TOOTH EXTRACTION        (Not in a hospital admission)    Review of patient's allergies indicates:  No Known Allergies   Social History     Tobacco Use    Smoking status: Former     Packs/day: 0.25     Years: 15.00     Pack years: 3.75     Types: Cigarettes     Quit date: 1982     Years since quittin.6    Smokeless tobacco: Former   Substance Use Topics    Alcohol use: Yes     Comment: no daily or heavy use, ~4 times per month      Family History   Adopted: Yes   Problem Relation Age of Onset    Amblyopia Neg Hx     Blindness Neg Hx     Cataracts Neg Hx     Glaucoma Neg Hx     Macular degeneration Neg Hx     Retinal detachment Neg Hx           Review of Systems :  Review of Systems   Constitutional:  Positive for malaise/fatigue. Negative for chills, diaphoresis, fever and weight loss.   HENT: Negative.  Negative for congestion, hearing loss, nosebleeds, sore throat and tinnitus.    Eyes: Negative.  Negative for blurred vision and discharge.   Respiratory:  Negative for cough, hemoptysis, sputum production, shortness of breath and wheezing.    Cardiovascular:  Positive for leg swelling. Negative for chest pain and palpitations.   Gastrointestinal:  Negative for abdominal pain, blood in stool, constipation, diarrhea, heartburn, melena, nausea and vomiting.   Genitourinary: Negative.    Musculoskeletal:  Positive for falls. Negative for back pain, joint pain and myalgias.   Skin: Negative.  Negative for itching and rash.   Neurological:  Positive for sensory change. Negative for dizziness, tingling, speech change, focal weakness, seizures, loss of consciousness, weakness and headaches.   Endo/Heme/Allergies: Negative.  Does not bruise/bleed easily.   Psychiatric/Behavioral: Negative.  Negative for depression. The patient is not nervous/anxious and  "does not have insomnia.              Physical Exam :  BP (!) 125/58 (BP Location: Left arm, Patient Position: Sitting, BP Method: Medium (Automatic))   Pulse 64   Temp 98.1 °F (36.7 °C) (Oral)   Resp 12   Ht 5' 8" (1.727 m)   Wt 74 kg (163 lb 2.3 oz)   LMP 10/01/2003   SpO2 98%   BMI 24.81 kg/m²   Wt Readings from Last 3 Encounters:   03/03/23 74 kg (163 lb 2.3 oz)   03/01/23 72.6 kg (160 lb)   02/23/23 71.2 kg (156 lb 13.7 oz)       Body mass index is 24.81 kg/m².      Physical Exam  Vitals and nursing note reviewed.   Constitutional:       General: She is not in acute distress.     Appearance: Normal appearance. She is well-developed. She is not ill-appearing.   HENT:      Head: Normocephalic and atraumatic.      Right Ear: External ear normal.      Left Ear: External ear normal.      Mouth/Throat:      Pharynx: No oropharyngeal exudate.   Eyes:      General: No scleral icterus.        Right eye: No discharge.         Left eye: No discharge.      Conjunctiva/sclera: Conjunctivae normal.   Neck:      Thyroid: No thyromegaly.      Trachea: No tracheal deviation.   Cardiovascular:      Rate and Rhythm: Normal rate and regular rhythm.      Heart sounds: Normal heart sounds. No murmur heard.  Pulmonary:      Effort: Pulmonary effort is normal. No respiratory distress.      Breath sounds: Normal breath sounds. No wheezing or rales.   Abdominal:      General: Bowel sounds are normal. There is no distension.      Palpations: Abdomen is soft. There is no mass.      Tenderness: There is no abdominal tenderness. There is no rebound.   Musculoskeletal:         General: No tenderness. Normal range of motion.      Cervical back: Normal range of motion and neck supple.      Right lower leg: Edema present.      Left lower leg: Edema present.   Lymphadenopathy:      Cervical: No cervical adenopathy.   Skin:     General: Skin is warm.      Coloration: Skin is not jaundiced.      Findings: No erythema or rash. "   Neurological:      Mental Status: She is alert and oriented to person, place, and time. Mental status is at baseline.      Cranial Nerves: No cranial nerve deficit.   Psychiatric:         Mood and Affect: Mood normal.         Speech: Speech normal.         Behavior: Behavior normal.           Current Outpatient Medications   Medication Sig Dispense Refill    acetaminophen (TYLENOL) 500 MG tablet Take 2 tablets (1,000 mg total) by mouth every 8 (eight) hours.  0    cyanocobalamin (VITAMIN B-12) 1000 MCG tablet Take 100 mcg by mouth once daily.      lisdexamfetamine (VYVANSE) 70 MG capsule Take 1 capsule (70 mg total) by mouth every morning. 30 capsule 0    losartan (COZAAR) 25 MG tablet TAKE 1 TABLET EVERY DAY 90 tablet 6    methyl salicylate-menthol 15-10% 15-10 % Crea Apply topically every evening. 113 g 0    pravastatin (PRAVACHOL) 20 MG tablet TAKE 1 TABLET EVERY DAY 90 tablet 3    propranoloL (INDERAL) 20 MG tablet Take 1 tablet (20 mg total) by mouth 2 (two) times daily. 180 tablet 3    buPROPion (WELLBUTRIN XL) 300 MG 24 hr tablet Take 1 tablet (300 mg total) by mouth once daily. 30 tablet 0    calcium-vitamin D tablet 600 mg-200 units Take 1 tablet by mouth once daily. 30 tablet 0    FLUoxetine 40 MG capsule Take 1 capsule (40 mg total) by mouth once daily. 30 capsule 0    gabapentin (NEURONTIN) 100 MG capsule Take 1 capsule (100 mg total) by mouth 3 (three) times daily. 270 capsule 1     No current facility-administered medications for this visit.       Pertinent Diagnostic studies:      Clinical Support on 03/01/2023   Component Date Value Ref Range Status    POC Cholesterol, Total 03/01/2023 164  <240 MG/DL Final    POC Cholesterol, HDL 03/01/2023 72  MG/DL Final    POC Cholesterol, LDL 03/01/2023 75  <160 MG/DL Final    POC Triglycerides 03/01/2023 91  <160 MG/DL Final    POC Glucose 03/01/2023 97  60 - 140 MG/DL Final   Lab Visit on 02/27/2023   Component Date Value Ref Range Status    WBC 02/27/2023  6.57  3.90 - 12.70 K/uL Final    RBC 02/27/2023 3.58 (L)  4.00 - 5.40 M/uL Final    Hemoglobin 02/27/2023 10.7 (L)  12.0 - 16.0 g/dL Final    Hematocrit 02/27/2023 33.1 (L)  37.0 - 48.5 % Final    MCV 02/27/2023 93  82 - 98 fL Final    MCH 02/27/2023 29.9  27.0 - 31.0 pg Final    MCHC 02/27/2023 32.3  32.0 - 36.0 g/dL Final    RDW 02/27/2023 13.0  11.5 - 14.5 % Final    Platelets 02/27/2023 149 (L)  150 - 450 K/uL Final    MPV 02/27/2023 10.4  9.2 - 12.9 fL Final    Immature Granulocytes 02/27/2023 0.3  0.0 - 0.5 % Final    Gran # (ANC) 02/27/2023 3.7  1.8 - 7.7 K/uL Final    Immature Grans (Abs) 02/27/2023 0.02  0.00 - 0.04 K/uL Final    Comment: Mild elevation in immature granulocytes is non specific and   can be seen in a variety of conditions including stress response,   acute inflammation, trauma and pregnancy. Correlation with other   laboratory and clinical findings is essential.      Lymph # 02/27/2023 2.0  1.0 - 4.8 K/uL Final    Mono # 02/27/2023 0.7  0.3 - 1.0 K/uL Final    Eos # 02/27/2023 0.2  0.0 - 0.5 K/uL Final    Baso # 02/27/2023 0.03  0.00 - 0.20 K/uL Final    nRBC 02/27/2023 0  0 /100 WBC Final    Gran % 02/27/2023 55.7  38.0 - 73.0 % Final    Lymph % 02/27/2023 30.7  18.0 - 48.0 % Final    Mono % 02/27/2023 10.4  4.0 - 15.0 % Final    Eosinophil % 02/27/2023 2.4  0.0 - 8.0 % Final    Basophil % 02/27/2023 0.5  0.0 - 1.9 % Final    Differential Method 02/27/2023 Automated   Final    Sodium 02/27/2023 137  136 - 145 mmol/L Final    Potassium 02/27/2023 4.2  3.5 - 5.1 mmol/L Final    Chloride 02/27/2023 106  95 - 110 mmol/L Final    CO2 02/27/2023 26  23 - 29 mmol/L Final    Glucose 02/27/2023 112 (H)  70 - 110 mg/dL Final    BUN 02/27/2023 19  8 - 23 mg/dL Final    Creatinine 02/27/2023 1.1  0.5 - 1.4 mg/dL Final    Calcium 02/27/2023 9.3  8.7 - 10.5 mg/dL Final    Total Protein 02/27/2023 6.7  6.0 - 8.4 g/dL Final    Albumin 02/27/2023 3.7  3.5 - 5.2 g/dL Final    Total Bilirubin 02/27/2023 0.7   0.1 - 1.0 mg/dL Final    Comment: For infants and newborns, interpretation of results should be based  on gestational age, weight and in agreement with clinical  observations.    Premature Infant recommended reference ranges:  Up to 24 hours.............<8.0 mg/dL  Up to 48 hours............<12.0 mg/dL  3-5 days..................<15.0 mg/dL  6-29 days.................<15.0 mg/dL      Alkaline Phosphatase 02/27/2023 50 (L)  55 - 135 U/L Final    AST 02/27/2023 19  10 - 40 U/L Final    ALT 02/27/2023 21  10 - 44 U/L Final    Anion Gap 02/27/2023 5 (L)  8 - 16 mmol/L Final    eGFR 02/27/2023 52.7 (A)  >60 mL/min/1.73 m^2 Final                 Oncology History    No history exists.     Assessment :       1. Light chain disease, kappa type    2. Normocytic anemia    3. Thrombocytopenia    4. Bilateral leg edema    5. Stage 3a chronic kidney disease        Plan :     Bicytopenia   Mild normocytic anemia hb 9-10 range.   Thrombocytopenia 130s, asymptomatic   Copper zinc wnl. Hiv/Hep checked in 2022 wnl  B12 low normal - supplementation advised.   Work up ordered, continue monitoring     Elevated kappa light chain  Mildly abn ratio. Repeat labs ordered. Monitor     CKD3a- monitor       Bilateral leg swelling- conservative measure.  Nov 2022- neg for DVT      Problem List Items Addressed This Visit       CKD (chronic kidney disease) stage 3, GFR 30-59 ml/min     Other Visit Diagnoses       Light chain disease, kappa type    -  Primary    Relevant Orders    Protein electrophoresis, timed urine    Immunofixation, 24 hour Urine    Normocytic anemia        Relevant Orders    Immunoglobulin Free LT Chains Blood    Immunofixation Electrophoresis    Protein Electrophoresis, Serum    SOLUBLE TRANSFERRIN RECEPTOR    Ferritin    Iron and TIBC    OncoHeme (NGS) Hematologic Neoplasms, Bld    Pathologist Interpretation Differential    Thrombocytopenia        Relevant Orders    OncoHeme (NGS) Hematologic Neoplasms, Bld    Pathologist  Interpretation Differential    Bilateral leg edema              I spent >30 mins on reviewing epic chart notes, reviewing tests, nursing concerns,obtaining history, performing physical exam, counseling and educating patient/family/caregiver, documentation, independently interpreting results and discussing them with patient/family/caregiver, care coordination, ordering medications/ tests/ procedures and referring and communicating with other health care professionals.       Electronically signed by Xiao Fan    [facility] Appointments   Date Time Provider Department Center   3/13/2023  1:00 PM Sandra Rivera, PT [facility]   3/13/2023  3:15 PM Dane Portillo, PT [facility]   3/17/2023 11:45 AM Sandra Rivera, PT [facility]   3/17/2023  1:00 PM Aditi Amador, PT [facility]   3/20/2023  8:00 AM Aditi Amador, PT [facility]   3/20/2023 10:30 AM Sandra Rivera, PT [facility]   3/22/2023  9:00 AM Tracey Perera, PTA [facility]   3/22/2023 10:15 AM Sandra Rivera, PT [facility]   3/27/2023  1:00 PM Sandra Rivera, PT [facility]   3/27/2023  2:00 PM Hayden Yates, PT [facility]   3/29/2023  8:00 AM Tracey Perera, PTA [facility]   3/29/2023 10:15 AM Sandra Rivera, PT [facility]   4/3/2023  9:00 AM Traecy Perera, PTA [facility]   4/3/2023 11:15 AM Sandra Rivera, PT [facility]   4/4/2023  9:15 AM Timothy Escobar MD [facility] FLYNN Davey [facility]   4/5/2023 10:00 AM HOME MONITOR DEVICE CHECK, [facility] [address]   4/5/2023  1:00 PM Uche Gipson, PTA [facility]   4/5/2023  2:00 PM Hayden Yates, PT [facility]   4/10/2023 11:15 AM Sandra Rivera, PT [facility]   4/10/2023  1:00 PM Hayden Yates, PT [facility]   4/12/2023  8:00 AM Lenny Delgado, PT [facility]   4/12/2023 10:15 AM Sandra Delpy, PT [facility]   4/17/2023 10:00 AM Aditi Amador, PT [facility]   4/17/2023 11:15 AM Sandra Delpy, PT [facility]   4/19/2023 10:15 AM Sandra Delpy, PT [facility]   4/19/2023 11:00 AM Hayden Yates, PT [facility]   4/24/2023 10:00 AM Lenny Dizacomo, PT [facility]   4/24/2023 11:15 AM Sandra Delpy, PT [facility]   4/26/2023  9:00 AM Lenny Dizacomo, PT [facility]   4/26/2023 10:15 AM Sandra Delpy, PT [facility]   5/4/2023 11:45 AM [facility] OI-CT1 500 LB LIMIT [facility]   5/4/2023  1:00 PM Nicolette Cheek MD [facility] NEUROS[address]   8/25/2023  9:15 AM INJECTION [facility] AMB INF [facility]           This note was created with voice recognition software.  Grammatical, syntax and spelling errors may be inevitable.

## 2023-03-06 ENCOUNTER — CLINICAL SUPPORT (OUTPATIENT)
Dept: REHABILITATION | Facility: HOSPITAL | Age: 73
End: 2023-03-06
Attending: NEUROLOGICAL SURGERY
Payer: MEDICARE

## 2023-03-06 DIAGNOSIS — R26.81 GAIT INSTABILITY: Primary | ICD-10-CM

## 2023-03-06 DIAGNOSIS — Z74.09 IMPAIRED FUNCTIONAL MOBILITY, BALANCE, GAIT, AND ENDURANCE: ICD-10-CM

## 2023-03-06 PROCEDURE — 97110 THERAPEUTIC EXERCISES: CPT | Mod: HCNC,PO,CQ

## 2023-03-06 PROCEDURE — 97112 NEUROMUSCULAR REEDUCATION: CPT | Mod: HCNC,PO,CQ

## 2023-03-06 NOTE — PROGRESS NOTES
[facility]   Physical Therapy Treatment Note     Name: Manisha Wynne  Clinic Number: [id number]    Therapy Diagnosis:   Encounter Diagnoses   Name Primary?    Gait instability Yes    Impaired functional mobility, balance, gait, and endurance        Physician: Sunitha Rosario P*    Visit Date: 3/6/2023    Encounter Diagnoses   Name Primary?    Gait instability      Impaired functional mobility, balance, gait, and endurance Yes      Physician: Sunitha Rosario P*     Physician Orders: PT Eval and Treat Neuro Physical Therapy   Medical Diagnosis from Referral: R26.81 (ICD-10-CM) - Gait instability  Evaluation Date: 2/13/2023  Authorization Period Expiration: 2/2/24  Plan of Care Expiration: 4/3/23  Visit # / Visits authorized: 2/ 1    PTA Visit #: 1/5     Time In: 1:00 PM   Time Out: 1:55 PM   Total Billable Time: 55 minutes    SUBJECTIVE     Pt reports: No new falls.  She was not compliant with home exercise program.  Response to previous treatment: NO adverse effects  Functional change: Ongoing    Pain: 0/10  Location: NA     OBJECTIVE     Objective Measures updated at progress report unless specified.     Treatment     Manisha received the treatments listed below:      therapeutic exercises to develop strength, endurance, ROM, flexibility, posture, and core stabilization for 25 minutes including:    Seated marching 2 trials of 1 minute  Supine: straight leg raises 2x15 reps  Bridges 2x15   Side lying Hip Abd 2x15 reps  Standing calf stretch 2 trials of 1 minute    neuromuscular re-education activities to improve: Balance, Coordination, Kinesthetic, Sense, Proprioception, and Posture for 30 minutes. The following activities were included:    Sit to stand with No upper extremity support 2x10 reps     //Bars:   2x10 reps of heel raises NO upper extremity support CGA   2 minutes of lateral weight shifts standing on 4 point fitter CGA   2x10 reps of alterate foot placement  onto 4 point fitter CGA  2x10 reps of step ups onto 4 point fitter CGA Min A     therapeutic activities to improve functional performance for 0  minutes, including:  NP             Patient Education and Home Exercises     Home Exercises Provided and Patient Education Provided     Education provided:   - home exercise program     Written Home Exercises Provided: yes. Exercises were reviewed and Manisha was able to demonstrate them prior to the end of the session.  Manisha demonstrated good  understanding of the education provided. See EMR under Patient Instructions for exercises provided during therapy sessions    ASSESSMENT   Patient presents with no complaints of pain. She was instructed in a basic seated and supine home exercise program for strengthening. She verbalized good understanding  and demonstrated fair technique. Remainder of treatment was focused on balance interventions. She typically drifts posteriorly with balance challenges and likely would have fallen had she been unguarded. Tolerated well.      Manisha Is progressing well towards her goals.   Pt prognosis is Fair.     Pt will continue to benefit from skilled outpatient physical therapy to address the deficits listed in the problem list box on initial evaluation, provide pt/family education and to maximize pt's level of independence in the home and community environment.     Pt's spiritual, cultural and educational needs considered and pt agreeable to plan of care and goals.     Anticipated barriers to physical therapy: co morbidities    Goals:   Short Term Goals: 6 weeks   Patient to perform HEP Independent   Patient to score less than or equal to 45 sec on the 5 times sit to stand for improved transfers and functional strength  Patient to perform 20 min of aerobic activity atleast 3 times per week for improved endurance     Long Term Goals: 12 weeks   Patient to score greater than or equal 18/30 on the FGA for decreased fall risk  Patient to  improved bilateral hip ext strength to 4/5 for improved core stability and improved standing  Patient to score greater than or equal to 68% on the ABC for improved balance confidence  Patient to score less than or equal to 11 sec on the TUG for improved gait and balance  PLAN     Continue PT plan of care     Uche Gipson, PTA

## 2023-03-13 ENCOUNTER — CLINICAL SUPPORT (OUTPATIENT)
Dept: REHABILITATION | Facility: HOSPITAL | Age: 73
End: 2023-03-13
Attending: NEUROLOGICAL SURGERY
Payer: MEDICARE

## 2023-03-13 ENCOUNTER — LAB VISIT (OUTPATIENT)
Dept: LAB | Facility: OTHER | Age: 73
End: 2023-03-13
Attending: STUDENT IN AN ORGANIZED HEALTH CARE EDUCATION/TRAINING PROGRAM
Payer: MEDICARE

## 2023-03-13 DIAGNOSIS — D89.89 LIGHT CHAIN DISEASE, KAPPA TYPE: ICD-10-CM

## 2023-03-13 DIAGNOSIS — R26.81 GAIT INSTABILITY: Primary | ICD-10-CM

## 2023-03-13 DIAGNOSIS — G95.9 CERVICAL MYELOPATHY: Primary | ICD-10-CM

## 2023-03-13 DIAGNOSIS — M54.2 PAIN IN NECK: ICD-10-CM

## 2023-03-13 DIAGNOSIS — Z74.09 IMPAIRED FUNCTIONAL MOBILITY, BALANCE, GAIT, AND ENDURANCE: ICD-10-CM

## 2023-03-13 LAB
PROT 24H UR-MRATE: NORMAL MG/SPEC (ref 0–100)
PROT UR-MCNC: <7 MG/DL (ref 0–15)
URINE COLLECTION DURATION: 24 HR
URINE VOLUME: 1350 ML

## 2023-03-13 PROCEDURE — 97110 THERAPEUTIC EXERCISES: CPT | Mod: HCNC,PO

## 2023-03-13 PROCEDURE — 86335 PATHOLOGIST INTERPRETATION UIFE: ICD-10-PCS | Mod: 26,HCNC,, | Performed by: PATHOLOGY

## 2023-03-13 PROCEDURE — 86335 IMMUNFIX E-PHORSIS/URINE/CSF: CPT | Mod: 26,HCNC,, | Performed by: PATHOLOGY

## 2023-03-13 PROCEDURE — 84156 ASSAY OF PROTEIN URINE: CPT | Mod: HCNC | Performed by: STUDENT IN AN ORGANIZED HEALTH CARE EDUCATION/TRAINING PROGRAM

## 2023-03-13 PROCEDURE — 97112 NEUROMUSCULAR REEDUCATION: CPT | Mod: HCNC,PO

## 2023-03-13 PROCEDURE — 86335 IMMUNFIX E-PHORSIS/URINE/CSF: CPT | Mod: HCNC | Performed by: STUDENT IN AN ORGANIZED HEALTH CARE EDUCATION/TRAINING PROGRAM

## 2023-03-13 NOTE — PROGRESS NOTES
"[facility]   Physical Therapy Treatment Note     Name: Manisha Wynne  Clinic Number: [id number]    Therapy Diagnosis:   Encounter Diagnoses   Name Primary?    Cervical myelopathy Yes    Pain in neck      Physician: Sunitha Rosario P*  Visit Date: 3/13/2023    Physician Orders: PT Eval and Treat  Medical Diagnosis from Referral:  G95.9 (ICD-10-CM) - Cervical myelopathy  Evaluation Date: 2/24/2023  Authorization Period Expiration: 03/24/2023  Plan of Care Expiration: 04/24/2023  Progress Note Due: 03/24/2023  Visit # / Visits authorized: 1/1, 1/TBD  FOTO: 1/3  PTA Visit #: 0/5     Time In: 3:00 pm  Time Out: 4:00 pm  Total Billable Time: 30 minutes 1:1 time    SUBJECTIVE     Patient reports she was partially compliant with her initial HEP due to being unsure if she was performing her exercises correctly, and stating her bed may have been too soft to do her chin tucks properly.    She  was partially  compliant with home exercise program.    Response to previous treatment: Initial follow up session.  Functional change: Ongoing.    Pain: 5/10  Location: Left sided neck, scapular, and arm pain.    OBJECTIVE     Objective Measures updated at progress report unless specified.     Treatment     Manisha received the treatments listed below:      Therapeutic Exercise to develop strength, endurance, ROM, and posture for 30 minutes including:  Supine Chin Tucks: 3x10 with 3" hold  Supine Cervical Rotations: 3x10  Seated Upper Trap Stretch: 3x30", bilateral  Seated Levator Scap Stretch: 3x30", bilateral    Neuromuscular Re-Education activities to facilitate postural muscle for 30 minutes. The following activities were included:  Supine Shoulder Flexion: 3x10, YTB, bilateral  Supine T's: 3x10, YTB  Supine Shoulder Extension: 3x10, YTB, bilateral    Patient Education and Home Exercises     Home Exercises Provided and Patient Education Provided     Education provided:   - HEP " "Instruction    Written Home Exercises Provided: yes. Exercises were reviewed and Manisha was able to demonstrate them prior to the end of the session.  Manisha demonstrated fair  understanding of the education provided. See EMR under Patient Instructions for exercises provided during therapy sessions    ASSESSMENT     Session focused on progression of cervical spine flexibility and ROM exercises followed by initiation of postural strengthening. Patient with fair tolerance of session and requires close supervision with moderate tactile and verbal cues to perform all exercises correctly. She reported feeling "a good stretch" in her pectoral muscles with performance of supine resistance band exercises. Provided patient with updated HEP at end of session which she verbalized of, though will benefit from review during future sessions to ensure proper form/technique is followed.    Manisha Is progressing well towards her goals.   Pt prognosis is Fair.     Pt will continue to benefit from skilled outpatient physical therapy to address the deficits listed in the problem list box on initial evaluation, provide pt/family education and to maximize pt's level of independence in the home and community environment.     Pt's spiritual, cultural and educational needs considered and pt agreeable to plan of care and goals.     Anticipated barriers to physical therapy: scheduling, compliance    Goals:  Short Term Goals: 4 weeks   Patient will be independent with initial HEP to improve therapy outcomes  Patient will report consistently sleeping with cervical support roll in single pillow at night to improve sleep quality  Patient will improve bilateral cervical spine AROM by >/=5 degrees to demonstrate improved cervical spine health  Patient will report neck pain at rest as </=4/10 to demonstrate improved quality of life     Long Term Goals: 8 weeks   Patient will be independent with final HEP to maintain long term gains achieved in " physical therapy  Patient will report neck pain at rest as </=2/10 to demonstrate improved quality of life  Patient will improve FOTO neck score to </=50% to demonstrate an improved in perceived functional status  Patient will improve bilateral cervical rotation AROM to >/=45 degrees to be able to turn her head while reversing her car    PLAN     Continue progressing per patient tolerance and POC.    Lenny Delgado, PT

## 2023-03-13 NOTE — PROGRESS NOTES
[facility]   Physical Therapy Treatment Note     Name: Manisha Wynne  Clinic Number: [id number]    Therapy Diagnosis:   Encounter Diagnoses   Name Primary?    Gait instability Yes    Impaired functional mobility, balance, gait, and endurance        Physician: Sunitha Rosario P*    Visit Date: 3/13/2023    Encounter Diagnoses   Name Primary?    Gait instability      Impaired functional mobility, balance, gait, and endurance Yes      Physician: Sunitha Rosario P*     Physician Orders: PT Eval and Treat Neuro Physical Therapy   Medical Diagnosis from Referral: R26.81 (ICD-10-CM) - Gait instability  Evaluation Date: 2/13/2023  Authorization Period Expiration: 6/6/23  Plan of Care Expiration: 4/3/23  Visit # / Visits authorized: 3/20    PTA Visit #: 0/5     Time In: 1:05 PM   Time Out: 1:48 PM   Total Billable Time: 43 minutes    SUBJECTIVE     Pt reports: She has been packing because she is moving her house.   She was not compliant with home exercise program.  Response to previous treatment: NO adverse effects  Functional change: Ongoing    Pain: 5/10  Location: neck pain     OBJECTIVE     Objective Measures updated at progress report unless specified.   FOTO: 52%  Treatment     Manisha received the treatments listed below:      therapeutic exercises to develop strength, endurance, ROM, flexibility, posture, and core stabilization for 8 minutes including:  SciFit level 4.0 x 8 minutes for endurance and strength    neuromuscular re-education activities to improve: Balance, Coordination, Kinesthetic, Sense, Proprioception, and Posture for 35 minutes. The following activities were included:    Sit to stand with No upper extremity support 2x10 reps     //Bars:   X 10 forward step over half domes with anterior weight shift, 1 upper extremity assist  X 10 backward step over half domes with posterior weight shift, 1 upper extremity assist    On airex pad:  X 30 sec feet together  stabilization  2 X 30 sec feet together eys closed   2 x 30 sec feet together with trunk rotation  2 x 30 sec staggered stance stabilization, both feet in front    2 minutes of lateral weight shifts standing on 4 point fitter CGA   2x10 reps of alterate foot placement onto 4 point fitter CGA  2x10 reps of step ups onto 4 point fitter CGA Min A     therapeutic activities to improve functional performance for 0  minutes, including:  NP             Patient Education and Home Exercises     Home Exercises Provided and Patient Education Provided     Education provided:   - home exercise program     Written Home Exercises Provided: yes. Exercises were reviewed and Manisha was able to demonstrate them prior to the end of the session.  Manisha demonstrated good  understanding of the education provided. See EMR under Patient Instructions for exercises provided during therapy sessions    ASSESSMENT   Ms. Wynne tolerated session well.  Focused session on standing balance activities, she requires upper extremity assist at ballet bar for stability.  She has 1 loss of balance posterior but able to self correct.  She requires 1 seated rest break during session due to fatigue.  She continues to be motivated to improve mobility.       Manisha Is progressing well towards her goals.   Pt prognosis is Fair.     Pt will continue to benefit from skilled outpatient physical therapy to address the deficits listed in the problem list box on initial evaluation, provide pt/family education and to maximize pt's level of independence in the home and community environment.     Pt's spiritual, cultural and educational needs considered and pt agreeable to plan of care and goals.     Anticipated barriers to physical therapy: co morbidities    Goals:   Short Term Goals: 6 weeks   Patient to perform HEP Independent  on going   Patient to score less than or equal to 45 sec on the 5 times sit to stand for improved transfers and functional strength  on going   Patient to perform 20 min of aerobic activity atleast 3 times per week for improved endurance on going      Long Term Goals: 12 weeks   Patient to score greater than or equal 18/30 on the FGA for decreased fall risk on going   Patient to improved bilateral hip ext strength to 4/5 for improved core stability and improved standing on going   Patient to score greater than or equal to 68% on the ABC for improved balance confidence on going   Patient to score less than or equal to 11 sec on the TUG for improved gait and balance on going   PLAN     Continue to progress balance, strength and endurance    Sandra Rivera, PT

## 2023-03-14 LAB
INTERPRETATION UR IFE-IMP: NORMAL
PATHOLOGIST INTERPRETATION UIFE: NORMAL

## 2023-03-17 ENCOUNTER — CLINICAL SUPPORT (OUTPATIENT)
Dept: REHABILITATION | Facility: HOSPITAL | Age: 73
End: 2023-03-17
Attending: NEUROLOGICAL SURGERY
Payer: MEDICARE

## 2023-03-17 DIAGNOSIS — Z74.09 IMPAIRED FUNCTIONAL MOBILITY, BALANCE, GAIT, AND ENDURANCE: ICD-10-CM

## 2023-03-17 DIAGNOSIS — R26.81 GAIT INSTABILITY: Primary | ICD-10-CM

## 2023-03-17 DIAGNOSIS — G95.9 CERVICAL MYELOPATHY: ICD-10-CM

## 2023-03-17 DIAGNOSIS — M54.2 PAIN IN NECK: ICD-10-CM

## 2023-03-17 PROCEDURE — 97112 NEUROMUSCULAR REEDUCATION: CPT | Mod: HCNC,PO

## 2023-03-17 PROCEDURE — 97110 THERAPEUTIC EXERCISES: CPT | Mod: HCNC,PO

## 2023-03-17 PROCEDURE — 97140 MANUAL THERAPY 1/> REGIONS: CPT | Mod: HCNC,PO
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
## 2023-03-20 ENCOUNTER — CLINICAL SUPPORT (OUTPATIENT)
Dept: REHABILITATION | Facility: HOSPITAL | Age: 73
End: 2023-03-20
Attending: NEUROLOGICAL SURGERY
Payer: MEDICARE

## 2023-03-20 DIAGNOSIS — Z74.09 IMPAIRED FUNCTIONAL MOBILITY, BALANCE, GAIT, AND ENDURANCE: ICD-10-CM

## 2023-03-20 DIAGNOSIS — R26.81 GAIT INSTABILITY: Primary | ICD-10-CM

## 2023-03-20 DIAGNOSIS — G95.9 CERVICAL MYELOPATHY: ICD-10-CM

## 2023-03-20 DIAGNOSIS — M54.2 PAIN IN NECK: ICD-10-CM

## 2023-03-20 PROCEDURE — 97112 NEUROMUSCULAR REEDUCATION: CPT | Mod: HCNC,PO

## 2023-03-20 PROCEDURE — 97110 THERAPEUTIC EXERCISES: CPT | Mod: HCNC,PO

## 2023-03-20 PROCEDURE — 97530 THERAPEUTIC ACTIVITIES: CPT | Mod: HCNC,PO

## 2023-03-20 PROCEDURE — 97140 MANUAL THERAPY 1/> REGIONS: CPT | Mod: HCNC,PO

## 2023-03-20 NOTE — PROGRESS NOTES
"[facility]   Physical Therapy Treatment Note and Progress Note    Name: Manisha Wynne  Clinic Number: [id number]    Therapy Diagnosis:   Encounter Diagnoses   Name Primary?    Gait instability Yes    Impaired functional mobility, balance, gait, and endurance     Cervical myelopathy     Pain in neck        Physician: Sunitha Rosario P*  Visit Date: 3/20/2023  Physician Orders: PT Eval and Treat  Medical Diagnosis from Referral:  G95.9 (ICD-10-CM) - Cervical myelopathy  Evaluation Date: 2/24/2023  Authorization Period Expiration: 03/24/2023  Plan of Care Expiration: 04/24/2023  Progress Note Due: 04/19/2023  Most Recent Progress Note: 3/20/23  Visit # / Visits authorized: 6/20  FOTO: 1/3  PTA Visit #: 0/5   Time In: 1:05 pm  Time Out: 2:00 pm  Total Billable Time: 55 minutes 1:1 time  SUBJECTIVE   She has not experienced any more arm pain, albeit she was a little sore after treatment. Overall, she felt pretty good after our last session. She feels like she is healing.  She was not compliant with home exercise program.  Response to previous treatment: Initial follow up session.  Functional change: Ongoing.  Pain: Soreness  Location: Left sided neck, scapular, and arm pain.  OBJECTIVE   Cervical Right Rotation: 35 degrees (42 after manual treatment)  Cervical Left Rotation: 35 degrees  O-A Joint: Limited significantly in flexion and sidebending  Upper Limb Tension Test   Right:   Left:  Treatment     Manisha received the treatments listed below:    Therapeutic Exercise to develop strength, endurance, ROM, and posture for 20 minutes including:  Supine Cervical Rotations + Overpressure (gentle with pillowcase), 10x5" hold  Seated Self-Thoracic Extension Mobilizations, 1/2 foam roller  Seated Self-Rotational Thoracic Mobilizations, 10x5" bilaterally  Objective measures taken (see above)    Manisha received the following manual therapy techniques: Joint mobilizations and Manual " "traction were applied for 25 minutes, including:  Objective measures taken (see above)  OA Distraction Mobilizations, bilaterally, Grade II-III  C1-C2 Distraction Mobilizations, left-sided, Grade II-III  C1-C2 Sideglide Mobilizations, Grade II-III  Cervical Rotational MET's, 3x8" bilaterally  Supine CTJ Posterior to Anterior Mobilizations    Neuromuscular Re-Education activities to facilitate postural muscle for 10 minutes. The following activities were included:  Supine Chin Tucks, 10x10 with 10" hold, pillow underneath occipital region (post-manual treatment); cueing for proper technique  Supine T's: 3x10, YTB     Patient Education and Home Exercises     Home Exercises Provided and Patient Education Provided     Education provided:   - HEP Instruction  Written Home Exercises Provided: yes. Exercises were reviewed and Manisha was able to demonstrate them prior to the end of the session.  Manisha demonstrated fair  understanding of the education provided. See EMR under Patient Instructions for exercises provided during therapy sessions    ASSESSMENT   Manisha demonstrates bilateral cervical rotation at 35 degrees of range of motion again at this visit prior to intervention. She demonstrates 42 degrees of cervical range of motion. Cervical range of motion significantly improved passively to 45 degrees after MET techniques.    Manisha Is progressing well towards her goals.   Pt prognosis is Fair.     Pt will continue to benefit from skilled outpatient physical therapy to address the deficits listed in the problem list box on initial evaluation, provide pt/family education and to maximize pt's level of independence in the home and community environment.     Pt's spiritual, cultural and educational needs considered and pt agreeable to plan of care and goals.     Anticipated barriers to physical therapy: scheduling, compliance    Goals:  Short Term Goals: 4 weeks   Patient will be independent with initial HEP to improve " therapy outcomes  Patient will report consistently sleeping with cervical support roll in single pillow at night to improve sleep quality  Patient will improve bilateral cervical spine AROM by >/=5 degrees to demonstrate improved cervical spine health  Patient will report neck pain at rest as </=4/10 to demonstrate improved quality of life     Long Term Goals: 8 weeks   Patient will be independent with final HEP to maintain long term gains achieved in physical therapy  Patient will report neck pain at rest as </=2/10 to demonstrate improved quality of life  Patient will improve FOTO neck score to </=50% to demonstrate an improved in perceived functional status  Patient will improve bilateral cervical rotation AROM to >/=45 degrees to be able to turn her head while reversing her car  PLAN     Continue progressing per patient tolerance and POC. Consider assessing upper limb nerve mobility next visit.    Aditi Amador PT, DPT  Board Certified in Orthopedic Physical Therapy

## 2023-03-20 NOTE — PROGRESS NOTES
"Subjective   Patient ID: Nicole Boykin is a 36 y.o. female who presents for 1 month f/u  (C/o left lower jaw pain ).    HPI   HTN: BP in office today 128/82. BP has been good <130/80. Denies CP/dizziness/visual changes.  PAROXYSMAL AFIB: Has seen Dr. Ugalde, seems stable. No recent issues.  HYPOTHYROID/HASHIMOTO'S: Follows with Dr. Day. Compliant with levo 88mcg daily. Pathology from thyroid biopsy 12/5/22 consistent w/Hashimoto's thyroiditis. ER CT neck 2/10/23 showed known goiter and additional ectopic thyroid tissue superiorly. Saw Dr. Day and referred to surgeon at [facility] for thyroidectomy. Difficult to breath at times based on position.   SMOKER: 20 pack years. Chantix has helping, still smoking about 2-3 per day.   COMPLEX MIGRAINES: Diagnosed 17 years ago. Treated w/Imitrex in the past. Endorses HA daily. Migraines w/auras and photosensitivity as well as nausea/vomiting. Has not scheduled with neuro yet, she will soon.   HAND PAIN: Endorses BL hand pain for some time now, she will have random joint pain as well at times. Endorses some burning skin at times as well. Volteran and Meloxicam are helping.   SINUSITIS: R maxillary sinus pain/pressure since thyroid enlargement began. New lower left jaw pain. She has not been to the dentist in about 2 years, she has had a lot of issues with her dentition in the past.     Review of Systems   Constitutional: Negative.    Respiratory: Negative.     Cardiovascular: Negative.    Gastrointestinal: Negative.        Objective   /82   Pulse 68   Ht 1.702 m (5' 7\")   Wt 92.1 kg (203 lb)   BMI 31.79 kg/m²     Physical Exam  Constitutional:       General: She is not in acute distress.     Appearance: Normal appearance. She is not ill-appearing.   HENT:      Head: Normocephalic and atraumatic.   Eyes:      Extraocular Movements: Extraocular movements intact.      Conjunctiva/sclera: Conjunctivae normal.   Cardiovascular:      Rate and Rhythm: Normal rate. " No readings since 2/22. Flirqt message sent and LVM.     Last 5 Patient Entered Readings                                      Current 30 Day Average: 115/65     Recent Readings 2/22/2018 2/22/2018 12/20/2017 12/20/2017 12/3/2017    SBP (mmHg) 115 115 149 149 136    DBP (mmHg) 65 65 90 90 81    Pulse 75 75 75 75 62               Pulmonary:      Effort: Pulmonary effort is normal.   Abdominal:      General: There is no distension.   Musculoskeletal:         General: Normal range of motion.      Cervical back: Normal range of motion.   Skin:     General: Skin is warm and dry.   Neurological:      General: No focal deficit present.      Mental Status: She is alert and oriented to person, place, and time.   Psychiatric:         Mood and Affect: Mood normal.         Behavior: Behavior normal.         Thought Content: Thought content normal.         Judgment: Judgment normal.         Assessment/Plan        We will try augmentin for 7 days for sinusitis and jaw pain as well as Flonase.  She will call after abx and let me know how she is doing and apprise me of the visit with the surgeon.   Advised she schedule with dentist very soon.  We will follow up in 1 month.

## 2023-03-20 NOTE — PROGRESS NOTES
[facility]   Physical Therapy Treatment Note     Name: Manisha Wynne  Clinic Number: [id number]    Therapy Diagnosis:   Encounter Diagnoses   Name Primary?    Gait instability Yes    Impaired functional mobility, balance, gait, and endurance        Physician: Sunitha Rosario P*    Visit Date: 3/20/2023    Encounter Diagnoses   Name Primary?    Gait instability      Impaired functional mobility, balance, gait, and endurance Yes      Physician: Sunitha Rosario P*     Physician Orders: PT Eval and Treat Neuro Physical Therapy   Medical Diagnosis from Referral: R26.81 (ICD-10-CM) - Gait instability  Evaluation Date: 2/13/2023  Authorization Period Expiration: 6/6/23  Plan of Care Expiration: 4/3/23  Visit # / Visits authorized: 5/20    PTA Visit #: 0/5     Time In: 10:20 AM   Time Out: 11:15 AM  Total Billable Time:  55 minutes    SUBJECTIVE     Pt reports: I think I loss my balance with turns.   She was not compliant with home exercise program.  Response to previous treatment: I was tired after Friday  Functional change: Ongoing    Pain: 5/10  Location: neck pain     OBJECTIVE     Objective Measures updated at progress report unless specified.   FOTO: 52%  Treatment     Manisha received the treatments listed below:      therapeutic exercises to develop strength, endurance, ROM, flexibility, posture, and core stabilization for 8 minutes including:  SciFit level 5.0 x 8 minutes for endurance and strength    neuromuscular re-education activities to improve: Balance, Coordination, Kinesthetic, Sense, Proprioception, and Posture for 37 minutes. The following activities were included:    All at [employer] Bars: with 1 upper extremity assist     X 6 laps forward stepping over orange hurdles   X 4 laps lateral stepping over orange hurdles  X 4 laps tandem gait    On rockerboard:  X 10 A-P mobility   X 60 sec stabilization in A-P direction, decreasing upper extremity assist  X 10  M-lateral mobility   X 60 sec stabilization in M-l direction, decreasing upper extremity assist    X 10 lower extremity taps onto orange cones, standing on 4 point fitter  2x10 reps of step ups onto 4 point fitter CGA Min A       100 feet x 2 Ambulation with head turns  100 feet x 2 Ambualtion with head nods   100 feet x 1 narrowed base of support walking   50 feet backward walking      therapeutic activities to improve functional performance for 10  minutes, including:    Practice 90 degree turns with square pattern X 4 laps clockwise  Practice 90 degree turns with square pattern X 4 laps counter clockwise  Square walking: forward, lateral and backward x 4 laps clockwise  Square walking: forward, lateral and backward x 4 laps counter clockwise    Vitals: taken left arm in seated  /74  HR 60     Patient Education and Home Exercises     Home Exercises Provided and Patient Education Provided     Education provided:   - home exercise program, attention to task     Written Home Exercises Provided: yes. Exercises were reviewed and Manisha was able to demonstrate them prior to the end of the session.  Manisha demonstrated good  understanding of the education provided. See EMR under Patient Instructions for exercises provided during therapy sessions    ASSESSMENT   Ms. Wynne tolerated session well.  She has 1 loss of balance with narrowed base walking, required min A to regain balance.  Given multiple seated and standing rest breaks during session due to fatigue. Practiced change in direction walking with cones as well as 90 degree turns, she demo's good stability with both.  Will progress balance challenge to focus on dynamic and changing environments.      Manisha Is progressing well towards her goals.   Pt prognosis is Fair.     Pt will continue to benefit from skilled outpatient physical therapy to address the deficits listed in the problem list box on initial evaluation, provide pt/family education and to  maximize pt's level of independence in the home and community environment.     Pt's spiritual, cultural and educational needs considered and pt agreeable to plan of care and goals.     Anticipated barriers to physical therapy: co morbidities    Goals:   Short Term Goals: 6 weeks   Patient to perform HEP Independent  on going   Patient to score less than or equal to 45 sec on the 5 times sit to stand for improved transfers and functional strength on going   Patient to perform 20 min of aerobic activity atleast 3 times per week for improved endurance on going      Long Term Goals: 12 weeks   Patient to score greater than or equal 18/30 on the FGA for decreased fall risk on going   Patient to improved bilateral hip ext strength to 4/5 for improved core stability and improved standing on going   Patient to score greater than or equal to 68% on the ABC for improved balance confidence on going   Patient to score less than or equal to 11 sec on the TUG for improved gait and balance on going   PLAN     Continue to progress balance, strength and endurance    Sandra Rivera, PT

## 2023-03-22 ENCOUNTER — CLINICAL SUPPORT (OUTPATIENT)
Dept: REHABILITATION | Facility: HOSPITAL | Age: 73
End: 2023-03-22
Payer: MEDICARE

## 2023-03-22 DIAGNOSIS — R26.81 GAIT INSTABILITY: Primary | ICD-10-CM

## 2023-03-22 DIAGNOSIS — M54.2 PAIN IN NECK: ICD-10-CM

## 2023-03-22 DIAGNOSIS — G95.9 CERVICAL MYELOPATHY: Primary | ICD-10-CM

## 2023-03-22 DIAGNOSIS — Z74.09 IMPAIRED FUNCTIONAL MOBILITY, BALANCE, GAIT, AND ENDURANCE: ICD-10-CM

## 2023-03-22 PROCEDURE — 97112 NEUROMUSCULAR REEDUCATION: CPT | Mod: HCNC,PO,CQ

## 2023-03-22 PROCEDURE — 97140 MANUAL THERAPY 1/> REGIONS: CPT | Mod: HCNC,PO,CQ

## 2023-03-22 PROCEDURE — 97110 THERAPEUTIC EXERCISES: CPT | Mod: HCNC,PO,CQ

## 2023-03-22 PROCEDURE — 97112 NEUROMUSCULAR REEDUCATION: CPT | Mod: HCNC,PO

## 2023-03-22 NOTE — PROGRESS NOTES
[facility]   Physical Therapy Treatment Note     Name: Manisha Wynne  Clinic Number: [id number]    Therapy Diagnosis:   Encounter Diagnoses   Name Primary?    Gait instability Yes    Impaired functional mobility, balance, gait, and endurance        Physician: Sunitha Rosario P*    Visit Date: 3/22/2023    Encounter Diagnoses   Name Primary?    Gait instability      Impaired functional mobility, balance, gait, and endurance Yes      Physician: Sunitha Rosario P*     Physician Orders: PT Eval and Treat Neuro Physical Therapy   Medical Diagnosis from Referral: R26.81 (ICD-10-CM) - Gait instability  Evaluation Date: 2/13/2023  Authorization Period Expiration: 6/6/23  Plan of Care Expiration: 4/3/23  Visit # / Visits authorized: 6/20    PTA Visit #: 0/5     Time In: 10:15 AM   Time Out: 11:00 AM  Total Billable Time:  45 minutes    SUBJECTIVE     Pt reports: My neck is feeling better.    She was not compliant with home exercise program.  Response to previous treatment: I felt ok   Functional change: Ongoing    Pain: 3/10  Location: neck pain     OBJECTIVE     Objective Measures updated at progress report unless specified.   FOTO: 54%    Treatment     Manisha received the treatments listed below:      therapeutic exercises to develop strength, endurance, ROM, flexibility, posture, and core stabilization for 0 minutes including:      neuromuscular re-education activities to improve: Balance, Coordination, Kinesthetic, Sense, Proprioception, and Posture for 45 minutes. The following activities were included:    All at ballet Bars: with 1 upper extremity assist     X 8 laps forward stepping over orange hurdles, gradual decrease of upper extremity assist  X 4 laps lateral stepping over orange hurdles    X 30 sec hold with front foot on soft side of bosu, both sides  X 15 forward lunge onto soft side of bosu, both lower extremity   X 30 stabilization on firm side of bosu  X 15  squats on firm side of bosu    GaitBetter:  Speed: 1.4 mph  Time: 5 min  Overall Score: 179  Distance: 195 yds  Distractors: easy  Obstacles: 0%  Light & Fo%    GaitBetter: Trial #2   Speed: 1.6 mph  Time: 6 min  Overall Score: 291  Distance:  230 yds  Distractors: easy  Obstacles: 18%  Light & Fo%        therapeutic activities to improve functional performance for 0  minutes, including:    Patient Education and Home Exercises     Home Exercises Provided and Patient Education Provided     Education provided:   - home exercise program, attention to task     Written Home Exercises Provided: yes. Exercises were reviewed and Manisha was able to demonstrate them prior to the end of the session.  Manisha demonstrated good  understanding of the education provided. See EMR under Patient Instructions for exercises provided during therapy sessions    ASSESSMENT   Ms. Wynne tolerated session well. Performed ambulation on treadmill with virtual reality today.  She had difficulty navigating the obstacles, taking high enough steps to clear, with both lower extremity.  She was challenged with bosu activity, but able to stabilize with upper extremity assist.  She continues to make good progress in Physical Therapy.     Manisha Is progressing well towards her goals.   Pt prognosis is Fair.     Pt will continue to benefit from skilled outpatient physical therapy to address the deficits listed in the problem list box on initial evaluation, provide pt/family education and to maximize pt's level of independence in the home and community environment.     Pt's spiritual, cultural and educational needs considered and pt agreeable to plan of care and goals.     Anticipated barriers to physical therapy: co morbidities    Goals:   Short Term Goals: 6 weeks   Patient to perform HEP Independent  on going   Patient to score less than or equal to 45 sec on the 5 times sit to stand for improved transfers and functional strength on  going   Patient to perform 20 min of aerobic activity atleast 3 times per week for improved endurance on going      Long Term Goals: 12 weeks   Patient to score greater than or equal 18/30 on the FGA for decreased fall risk on going   Patient to improved bilateral hip ext strength to 4/5 for improved core stability and improved standing on going   Patient to score greater than or equal to 68% on the ABC for improved balance confidence on going   Patient to score less than or equal to 11 sec on the TUG for improved gait and balance on going   PLAN     Continue to progress balance, strength and endurance    Sandra Rivera, PT

## 2023-03-22 NOTE — PROGRESS NOTES
"[facility]   Physical Therapy Treatment Note and Progress Note    Name: Manisha Wynne  Clinic Number: [id number]    Therapy Diagnosis:   Encounter Diagnoses   Name Primary?    Cervical myelopathy Yes    Pain in neck      Physician: Sunitha Rosario P*  Visit Date: 3/22/2023  Physician Orders: PT Eval and Treat  Medical Diagnosis from Referral:  G95.9 (ICD-10-CM) - Cervical myelopathy  Evaluation Date: 2/24/2023  Authorization Period Expiration: 03/24/2023  Plan of Care Expiration: 04/24/2023  Progress Note Due: 04/19/2023  Most Recent Progress Note: 3/20/23  Visit # / Visits authorized: 7/20  FOTO: 1/3  PTA Visit #: 1/5     Time In: 0915 (pt arrives 15 min late)  Time Out: 1002  Total Billable Time: 47 minutes 1:1 time    SUBJECTIVE   Patient reports she woke up and felt stiff in her neck this morning.   She was not compliant with home exercise program.  Response to previous treatment: "good"  Functional change: Ongoing.  Pain: Soreness  Location: Left sided neck, scapular, and arm pain.  OBJECTIVE       Treatment     Manisha received the treatments listed below    Therapeutic Exercise to develop strength, endurance, ROM, and posture for 20 minutes including:  Supine Cervical Rotations + Overpressure (gentle with pillowcase), 10x5" hold  Seated cervical extension SNAG (gentle with pillowcase) 10x5" hold   Seated Self-Thoracic Extension Mobilizations, 1/2 foam roller 10x5" hold  Seated Self-Rotational Thoracic Mobilizations, 10x5" bilaterally    Manisha received the following manual therapy techniques: Joint mobilizations, Soft tissue mobilizations, and Manual traction were applied for 10 minutes, including:  STM to UT, LS, suboccipitals  (left>right)    Did not perform the following manual:  OA Distraction Mobilizations, bilaterally, Grade II-III  C1-C2 Distraction Mobilizations, left-sided, Grade II-III  C1-C2 Sideglide Mobilizations, Grade II-III  Cervical Rotational MET's, " "3x8" bilaterally  Supine CTJ Posterior to Anterior Mobilizations    Neuromuscular Re-Education activities to facilitate postural muscle for 17 minutes. The following activities were included:  Supine Chin Tucks, 10x10 with 10" hold, pillow underneath occipital region (post-manual treatment); cueing for proper technique  Supine T's: 3x10, YTB   Seated bilateral shoulder ER with scap retract 10x5" hold YTB    Patient Education and Home Exercises     Home Exercises Provided and Patient Education Provided     Education provided:   - HEP Instruction  Written Home Exercises Provided: Patient instructed to cont prior HEP. Exercises were reviewed and Manisha was able to demonstrate them prior to the end of the session.  Manisha demonstrated fair  understanding of the education provided. See EMR under Patient Instructions for exercises provided during therapy sessions    ASSESSMENT   Patient demonstrates cervical soft tissue tightness and tenderness present during manual treatment this date, but with mild improvement in symptoms upon completion. She fatigues very quickly with postural and stability strengthening activities, and requires moderate cuing to maintain appropriate body mechanics.     Manisha Is progressing well towards her goals.   Pt prognosis is Fair.     Pt will continue to benefit from skilled outpatient physical therapy to address the deficits listed in the problem list box on initial evaluation, provide pt/family education and to maximize pt's level of independence in the home and community environment.     Pt's spiritual, cultural and educational needs considered and pt agreeable to plan of care and goals.     Anticipated barriers to physical therapy: scheduling, compliance    Goals:  Short Term Goals: 4 weeks   Patient will be independent with initial HEP to improve therapy outcomes  Patient will report consistently sleeping with cervical support roll in single pillow at night to improve sleep " quality  Patient will improve bilateral cervical spine AROM by >/=5 degrees to demonstrate improved cervical spine health  Patient will report neck pain at rest as </=4/10 to demonstrate improved quality of life     Long Term Goals: 8 weeks   Patient will be independent with final HEP to maintain long term gains achieved in physical therapy  Patient will report neck pain at rest as </=2/10 to demonstrate improved quality of life  Patient will improve FOTO neck score to </=50% to demonstrate an improved in perceived functional status  Patient will improve bilateral cervical rotation AROM to >/=45 degrees to be able to turn her head while reversing her car  PLAN     Continue progressing per patient tolerance and POC. Consider assessing upper limb nerve mobility next visit.    Tracey Perera, PTA

## 2023-03-29 DIAGNOSIS — I48.91 ATRIAL FIBRILLATION: Primary | ICD-10-CM

## 2023-03-29 DIAGNOSIS — N18.30 CKD (CHRONIC KIDNEY DISEASE) STAGE 3, GFR 30-59 ML/MIN: ICD-10-CM

## 2023-04-03 ENCOUNTER — CLINICAL SUPPORT (OUTPATIENT)
Dept: REHABILITATION | Facility: HOSPITAL | Age: 73
End: 2023-04-03
Attending: NEUROLOGICAL SURGERY
Payer: MEDICARE

## 2023-04-03 DIAGNOSIS — M54.2 PAIN IN NECK: ICD-10-CM

## 2023-04-03 DIAGNOSIS — R26.81 GAIT INSTABILITY: Primary | ICD-10-CM

## 2023-04-03 DIAGNOSIS — Z74.09 IMPAIRED FUNCTIONAL MOBILITY, BALANCE, GAIT, AND ENDURANCE: ICD-10-CM

## 2023-04-03 DIAGNOSIS — G95.9 CERVICAL MYELOPATHY: Primary | ICD-10-CM

## 2023-04-03 PROCEDURE — 97112 NEUROMUSCULAR REEDUCATION: CPT | Mod: HCNC,PO

## 2023-04-03 PROCEDURE — 97140 MANUAL THERAPY 1/> REGIONS: CPT | Mod: HCNC,PO,CQ

## 2023-04-03 PROCEDURE — 97110 THERAPEUTIC EXERCISES: CPT | Mod: HCNC,PO,CQ

## 2023-04-03 PROCEDURE — 97530 THERAPEUTIC ACTIVITIES: CPT | Mod: HCNC,PO

## 2023-04-03 PROCEDURE — 97112 NEUROMUSCULAR REEDUCATION: CPT | Mod: HCNC,PO,CQ

## 2023-04-03 NOTE — PROGRESS NOTES
"[facility]   Physical Therapy Treatment Note and Progress Note    Name: Manisha Wynne  Clinic Number: [id number]    Therapy Diagnosis:   Encounter Diagnoses   Name Primary?    Cervical myelopathy Yes    Pain in neck      Physician: Sunitha Rosario P*  Visit Date: 4/3/2023  Physician Orders: PT Eval and Treat  Medical Diagnosis from Referral:  G95.9 (ICD-10-CM) - Cervical myelopathy  Evaluation Date: 2/24/2023  Authorization Period Expiration: 03/24/2023  Plan of Care Expiration: 04/24/2023  Progress Note Due: 04/19/2023  Most Recent Progress Note: 3/20/23  Visit # / Visits authorized: 8/20  FOTO: 1/3  PTA Visit #: 2/5     Time In: 0918 (pt late arrival)  Time Out: 1000  Total Billable Time: 42 minutes    SUBJECTIVE   Patient reports she moved last week and reports she knows she was doing too much in general and performed too much heavy lifting.   She was not compliant with home exercise program.  Response to previous treatment: "good"  Functional change: Ongoing.  Pain: Soreness  Location: Left sided neck, scapular, and arm pain.  OBJECTIVE       Treatment     Manisha received the treatments listed below    Therapeutic Exercise to develop strength, endurance, ROM, and posture for 12 minutes including:  Supine Cervical Rotations + Overpressure (gentle with pillowcase), 10x5" hold  Seated cervical extension SNAG (gentle with pillowcase) 10x5" hold   Seated Self-Thoracic Extension Mobilizations, 1/2 foam roller 10x5" hold  Seated Self-Rotational Thoracic Mobilizations, 10x5" bilaterally    Manisha received the following manual therapy techniques: Joint mobilizations, Soft tissue mobilizations, and Manual traction were applied for 15 minutes, including:  STM to UT, LS, suboccipitals, cervical paraspinals  (left>right)    Did not perform the following manual:  OA Distraction Mobilizations, bilaterally, Grade II-III  C1-C2 Distraction Mobilizations, left-sided, Grade II-III  C1-C2 " "Sideglide Mobilizations, Grade II-III  Cervical Rotational MET's, 3x8" bilaterally  Supine CTJ Posterior to Anterior Mobilizations    Neuromuscular Re-Education activities to facilitate postural muscle for 15 minutes. The following activities were included:  Supine Chin Tucks, 10x10 with 10" hold, pillow underneath occipital region (post-manual treatment); cueing for proper technique  Supine T's: 3x10, YTB   Seated bilateral shoulder ER with scap retract 10x5" hold YTB    Patient Education and Home Exercises     Home Exercises Provided and Patient Education Provided     Education provided:   - HEP Instruction  Written Home Exercises Provided: Patient instructed to cont prior HEP. Exercises were reviewed and Manisha was able to demonstrate them prior to the end of the session.  Manisha demonstrated fair  understanding of the education provided. See EMR under Patient Instructions for exercises provided during therapy sessions    ASSESSMENT   Patient demonstrates significant increase in cervical soft tissue tightness present during manual treatment this date, but with mild improvement in flexibility upon completion, and good response to treatment. She would benefit from continuation of skilled therapy to further improve upright posture for symptom reduction and to maximize her overall function.     Manisha Is progressing well towards her goals.   Pt prognosis is Fair.     Pt will continue to benefit from skilled outpatient physical therapy to address the deficits listed in the problem list box on initial evaluation, provide pt/family education and to maximize pt's level of independence in the home and community environment.     Pt's spiritual, cultural and educational needs considered and pt agreeable to plan of care and goals.     Anticipated barriers to physical therapy: scheduling, compliance    Goals:  Short Term Goals: 4 weeks   Patient will be independent with initial HEP to improve therapy outcomes  Patient will " report consistently sleeping with cervical support roll in single pillow at night to improve sleep quality  Patient will improve bilateral cervical spine AROM by >/=5 degrees to demonstrate improved cervical spine health  Patient will report neck pain at rest as </=4/10 to demonstrate improved quality of life     Long Term Goals: 8 weeks   Patient will be independent with final HEP to maintain long term gains achieved in physical therapy  Patient will report neck pain at rest as </=2/10 to demonstrate improved quality of life  Patient will improve FOTO neck score to </=50% to demonstrate an improved in perceived functional status  Patient will improve bilateral cervical rotation AROM to >/=45 degrees to be able to turn her head while reversing her car  PLAN     Continue progressing per patient tolerance and POC. Consider assessing upper limb nerve mobility next visit.    Tracey Perera, PTA

## 2023-04-03 NOTE — PROGRESS NOTES
[facility]   Physical Therapy Treatment Note     Name: Manisha Wynne  Clinic Number: [id number]    Therapy Diagnosis:   Encounter Diagnosis   Name Primary?    Impaired functional mobility, balance, gait, and endurance Yes                 Physician: Sunitha Rosario P*    Visit Date: 4/5/2023    Encounter Diagnoses   Name Primary?    Gait instability      Impaired functional mobility, balance, gait, and endurance Yes      Physician: Sunitha Rosario P*     Physician Orders: PT Eval and Treat Neuro Physical Therapy   Medical Diagnosis from Referral: R26.81 (ICD-10-CM) - Gait instability  Evaluation Date: 2/13/2023  Authorization Period Expiration: 6/6/23  Plan of Care Expiration: 4/3/23  Visit # / Visits authorized: 9/20    PTA Visit #: 1/5     Time In: 1310 (late)  Time Out: 1400  Total Billable Time:  40 minutes    SUBJECTIVE     Pt reports: No new falls. Just a little shaken and feels a loss of confidence.  She was not compliant with home exercise program.  Response to previous treatment: I felt ok   Functional change: Ongoing    Pain: 3/10  Location: neck pain     OBJECTIVE     Objective Measures updated at progress report unless specified.   FOTO: 54%    Treatment     Manisha received the treatments listed below:      therapeutic exercises to develop strength, endurance, ROM, flexibility, posture, and core stabilization for 2 minutes including:  Standing calf stretches 2 trials of 1 minute bilateral upper extremity support     neuromuscular re-education activities to improve: Balance, Coordination, Kinesthetic, Sense, Proprioception, and Posture for 38 minutes. The following activities were included:    //Bars: with 1 upper extremity assist   On airex pad:  X 30 sec feet together stabilization  2 X 30 sec feet together eys closed , 1 finger assist on bar  2 x 30 sec feet together with trunk rotation  2 x 30 sec staggered stance stabilization, alternate foot fwd,  horizontal head turns    X 8 laps forward stepping over orange hurdles, gradual decrease of upper extremity assist  X 4 laps lateral stepping over orange hurdles    X 30 sec hold with front foot on soft side of bosu, both sides  X 15 forward lunge onto soft side of bosu, both lower extremity   X 30 stabilization on firm side of bosu  X 15 squats on firm side of bosu         therapeutic activities to improve functional performance for 00 minutes, including: npt performed today  5 x 2 sit to stand, no upper extremity assist    Floor transfer independently from low blue mat x 2 trials   1.  Progress from seated to half kneel, patient stays in this position then pushes through her upper extremity to return to mat seated   2.  Progress from seated to half kneel, then to quadruped.  Patient turns in quadruped position to face the mat.  Upper extremity on mat, progress to half kneel then push up from legs and arms to standing      Patient Education and Home Exercises     Home Exercises Provided and Patient Education Provided     Education provided:   - home exercise program, attention to task,  printed previous HEP to focus on core and hip strengthening    Written Home Exercises Provided: yes. Exercises were reviewed and Manisha was able to demonstrate them prior to the end of the session.  Manisha demonstrated good  understanding of the education provided. See EMR under Patient Instructions for exercises provided during therapy sessions    ASSESSMENT   Ms. Wynne tolerated session well. Her newly added home exercise program exercises were reviewed and practiced. She performed these well.     Manisha Is progressing well towards her goals.   Pt prognosis is Fair.     Pt will continue to benefit from skilled outpatient physical therapy to address the deficits listed in the problem list box on initial evaluation, provide pt/family education and to maximize pt's level of independence in the home and community environment.      Pt's spiritual, cultural and educational needs considered and pt agreeable to plan of care and goals.     Anticipated barriers to physical therapy: co morbidities    Goals:   Short Term Goals: 6 weeks   Patient to perform HEP Independent  on going   Patient to score less than or equal to 45 sec on the 5 times sit to stand for improved transfers and functional strength on going   Patient to perform 20 min of aerobic activity atleast 3 times per week for improved endurance on going      Long Term Goals: 12 weeks   Patient to score greater than or equal 18/30 on the FGA for decreased fall risk on going   Patient to improved bilateral hip ext strength to 4/5 for improved core stability and improved standing on going   Patient to score greater than or equal to 68% on the ABC for improved balance confidence on going   Patient to score less than or equal to 11 sec on the TUG for improved gait and balance on going   PLAN     Continue to progress balance, strength and endurance   REVIEW home exercise program provided last session    Uche Gipson, EDUARDO

## 2023-04-03 NOTE — PROGRESS NOTES
[facility]   Physical Therapy Treatment Note     Name: Manisha Wynne  Clinic Number: [id number]    Therapy Diagnosis:   Encounter Diagnoses   Name Primary?    Gait instability Yes    Impaired functional mobility, balance, gait, and endurance        Physician: Sunitha Rosario P*    Visit Date: 4/3/2023    Encounter Diagnoses   Name Primary?    Gait instability      Impaired functional mobility, balance, gait, and endurance Yes      Physician: Sunitha Rosario P*     Physician Orders: PT Eval and Treat Neuro Physical Therapy   Medical Diagnosis from Referral: R26.81 (ICD-10-CM) - Gait instability  Evaluation Date: 2/13/2023  Authorization Period Expiration: 6/6/23  Plan of Care Expiration: 4/3/23  Visit # / Visits authorized: 8/20    PTA Visit #: 0/5     Time In: 11:15 AM   Time Out: 12:00 AM  Total Billable Time:  45 minutes    SUBJECTIVE     Pt reports: I had a rough move. Also had a slip in her old house in the bathroom, has bruising on her left hip. Another fall when she was trying to get on the floor, she was on her knees to plug something in and feel back wards.  She is only having pain to touch of back of her head, denies any headache  She was not compliant with home exercise program.  Response to previous treatment: I felt ok   Functional change: Ongoing    Pain: 3/10  Location: neck pain     OBJECTIVE     Objective Measures updated at progress report unless specified.   FOTO: 54%    Treatment     Manisha received the treatments listed below:      therapeutic exercises to develop strength, endurance, ROM, flexibility, posture, and core stabilization for 0 minutes including:      neuromuscular re-education activities to improve: Balance, Coordination, Kinesthetic, Sense, Proprioception, and Posture for 30 minutes. The following activities were included:    All at ballet Bars: with 1 upper extremity assist   On airex pad:  X 30 sec feet together stabilization  2 X  30 sec feet together eys closed , 1 finger assist on bar  2 x 30 sec feet together with trunk rotation  2 x 30 sec staggered stance stabilization, both feet in front    X 8 laps forward stepping over orange hurdles, gradual decrease of upper extremity assist  X 4 laps lateral stepping over orange hurdles    X 30 sec hold with front foot on soft side of bosu, both sides  X 15 forward lunge onto soft side of bosu, both lower extremity   X 30 stabilization on firm side of bosu  X 15 squats on firm side of bosu         therapeutic activities to improve functional performance for 15  minutes, including:  5 x 2 sit to stand, no upper extremity assist    Floor transfer independently from low blue mat x 2 trials   1.  Progress from seated to half kneel, patient stays in this position then pushes through her upper extremity to return to mat seated   2.  Progress from seated to half kneel, then to quadruped.  Patient turns in quadruped position to face the mat.  Upper extremity on mat, progress to half kneel then push up from legs and arms to standing      Patient Education and Home Exercises     Home Exercises Provided and Patient Education Provided     Education provided:   - home exercise program, attention to task,  printed previous HEP to focus on core and hip strengthening    Written Home Exercises Provided: yes. Exercises were reviewed and Manisha was able to demonstrate them prior to the end of the session.  Manisha demonstrated good  understanding of the education provided. See EMR under Patient Instructions for exercises provided during therapy sessions    ASSESSMENT   Ms. Wynne tolerated session well. She is able to perform floor transfer independently using mat assist to transfer to standing from the floor. She has difficulty performing sit to stand today without upper extremity assist, cues for anterior weight shift and pushing through lower extremities. Educated patient to focus on hip and core strengthening  for HEP.  She will continue to benefit from skilled Physical Therapy to address impairments and maximize mobility.     Manisha Is progressing well towards her goals.   Pt prognosis is Fair.     Pt will continue to benefit from skilled outpatient physical therapy to address the deficits listed in the problem list box on initial evaluation, provide pt/family education and to maximize pt's level of independence in the home and community environment.     Pt's spiritual, cultural and educational needs considered and pt agreeable to plan of care and goals.     Anticipated barriers to physical therapy: co morbidities    Goals:   Short Term Goals: 6 weeks   Patient to perform HEP Independent  on going   Patient to score less than or equal to 45 sec on the 5 times sit to stand for improved transfers and functional strength on going   Patient to perform 20 min of aerobic activity atleast 3 times per week for improved endurance on going      Long Term Goals: 12 weeks   Patient to score greater than or equal 18/30 on the FGA for decreased fall risk on going   Patient to improved bilateral hip ext strength to 4/5 for improved core stability and improved standing on going   Patient to score greater than or equal to 68% on the ABC for improved balance confidence on going   Patient to score less than or equal to 11 sec on the TUG for improved gait and balance on going   PLAN     Continue to progress balance, strength and endurance    Sandra Rivera, PT

## 2023-04-05 ENCOUNTER — CLINICAL SUPPORT (OUTPATIENT)
Dept: REHABILITATION | Facility: HOSPITAL | Age: 73
End: 2023-04-05
Attending: NEUROLOGICAL SURGERY
Payer: MEDICARE

## 2023-04-05 ENCOUNTER — CLINICAL SUPPORT (OUTPATIENT)
Dept: CARDIOLOGY | Facility: HOSPITAL | Age: 73
End: 2023-04-05
Payer: MEDICARE

## 2023-04-05 DIAGNOSIS — G95.9 CERVICAL MYELOPATHY: Primary | ICD-10-CM

## 2023-04-05 DIAGNOSIS — M54.2 PAIN IN NECK: ICD-10-CM

## 2023-04-05 DIAGNOSIS — I49.5 SICK SINUS SYNDROME: ICD-10-CM

## 2023-04-05 DIAGNOSIS — Z74.09 IMPAIRED FUNCTIONAL MOBILITY, BALANCE, GAIT, AND ENDURANCE: Primary | ICD-10-CM

## 2023-04-05 DIAGNOSIS — I44.0 ATRIOVENTRICULAR BLOCK, FIRST DEGREE: ICD-10-CM

## 2023-04-05 DIAGNOSIS — Z74.09 IMPAIRED FUNCTIONAL MOBILITY, BALANCE, GAIT, AND ENDURANCE: ICD-10-CM

## 2023-04-05 DIAGNOSIS — R26.81 GAIT INSTABILITY: ICD-10-CM

## 2023-04-05 DIAGNOSIS — Z95.0 PRESENCE OF CARDIAC PACEMAKER: ICD-10-CM

## 2023-04-05 PROCEDURE — 93296 REM INTERROG EVL PM/IDS: CPT | Mod: HCNC | Performed by: INTERNAL MEDICINE

## 2023-04-05 PROCEDURE — 97140 MANUAL THERAPY 1/> REGIONS: CPT | Mod: HCNC,PO

## 2023-04-05 PROCEDURE — 97110 THERAPEUTIC EXERCISES: CPT | Mod: HCNC,PO

## 2023-04-05 PROCEDURE — 97112 NEUROMUSCULAR REEDUCATION: CPT | Mod: HCNC,PO

## 2023-04-05 PROCEDURE — 97112 NEUROMUSCULAR REEDUCATION: CPT | Mod: HCNC,PO,CQ

## 2023-04-05 NOTE — PROGRESS NOTES
"[facility]   Physical Therapy Treatment Note and Progress Note and Update to Plan of Care    Name: Manisha Wynne  Clinic Number: [id number]    Therapy Diagnosis:   Encounter Diagnoses   Name Primary?    Cervical myelopathy Yes    Pain in neck     Gait instability     Impaired functional mobility, balance, gait, and endurance      Physician: Sunitha Rosario P*  Visit Date: 4/5/2023  Physician Orders: PT Eval and Treat  Medical Diagnosis from Referral:  G95.9 (ICD-10-CM) - Cervical myelopathy  Evaluation Date: 2/24/2023  Authorization Period Expiration: 03/24/2023  Plan of Care Expiration: 04/24/2023 **Updated to 5/5/2023  Progress Note Due: 5/4/2023  Most Recent Progress Note: 4/5/2023  Visit # / Visits authorized: 13/20 (7 visits in episode)  FOTO: 1/3  PTA Visit #: 2/5   Time In: 2:00 pm  Time Out: 3:00 pm  Total Billable Time: 60 minutes  SUBJECTIVE   Patient reports she really "overdid" herself while lifting and moving her belongings to a different residence after last session, and she is feeling better at this time. She has been doing a lot of lifting lately. Her neck has been feeling pretty good overall. She is still feeling sore in the back of her neck, and she does feel like she can turn her head better.  She was not compliant with home exercise program.  Response to previous treatment: "good"  Functional change: Ongoing.  Pain: Soreness  Location: Left sided neck, scapular, and arm pain.  OBJECTIVE      Posture: Left shoulder girdle elevated in comparison to right, stands with sway back posture, increased thoracic kyphosis, and significant forward head posture that upon passive assistance in repositioning is unable to achieve full cervical retraction. Moderate left sided scoliosis in upper thoracic spine, with head sitting off center to right when viewed posteriorly.     Cervical Range of Motion (4/5/2023):     Degrees   Flexion 45 degrees   Extension 25 degrees " "  Right Rotation 40 degrees*   Left Rotation 25*   Right Side Bend 10 degrees   Left Side Bend 5 degrees   * = pain   Upper Extremity Strength/Myotomal Strength Testing:    Right Upper Extremity Left Upper Extremity   Shoulder Flexion 4+/5 4+/5   Shoulder Abduction 4/5 4/5   Shoulder External Rotation 4+/5 4+/5   Shoulder Internal Rotation 4+/5 4+/5   Elbow Flexion 4+/5 4+/5   Elbow Extension 4+/5 4+/5   C1-C2: Unable to fully side bend the cervical spine due to fusion; pure rotation utilized and measured at 40 degrees on right; 40 degrees on left  Shoulder Flexion: 150 degrees bilaterally  Treatment     Manisha received the treatments listed below  Therapeutic Exercise to develop strength, endurance, ROM, and posture for 24 minutes including:  Supine Cervical Rotations + Overpressure (gentle with pillowcase), 5x5" hold  Seated Self-Thoracic Extension Mobilizations, 1/2 foam roller 10x5" hold   Objective measures taken (see above)    Manisha received the following manual therapy techniques: Joint mobilizations, Soft tissue mobilizations, and Manual traction were applied for 16 minutes, including:  Cervical Rotational MET's, 3x8" bilaterally  OA Distraction Mobilizations, bilaterally, Grade II-III  Supine CTJ Posterior to Anterior Mobilizations    Did not perform the following manual:  C1-C2 Distraction Mobilizations, left-sided, Grade II-III  C1-C2 Sideglide Mobilizations, Grade II-III    Neuromuscular Re-Education activities to facilitate postural muscle for 20 minutes. The following activities were included:  Supine Chin Tucks, 10x10 with 10" hold, pillow underneath occipital region (post-manual treatment); cueing for proper technique  Supine T's and Y's: 2x12, Red TB   Seated Bilateral Shoulder ER with Scapular retract 10x5" hold YTB (not today)  Supine Horizontal Abduction, yellow band, one hand static, opposite hand concentric/eccentric    Patient Education and Home Exercises     Home Exercises Provided and " Patient Education Provided     Education provided:   - HEP Instruction  Written Home Exercises Provided: Patient instructed to cont prior HEP. Exercises were reviewed and Manisha was able to demonstrate them prior to the end of the session.  Manisha demonstrated fair  understanding of the education provided. See EMR under Patient Instructions for exercises provided during therapy sessions    ASSESSMENT   Manisha has undergone 7 visits in the care of her cervical spine post fusion of C3-C6. She has maintained improvement in upper cervical mobility with fairly good carry-over from session despite increased activity as noted with rotation at 40 -45 degrees this treatment session. She continues to demonstrate postural imbalance of upper cervical extension, lower cervical and thoracic flexion with protrusion of the head, and decreased segmental mobility of the upper cervical spine and thoracic spine, and decreased deep neck flexor endurance. Patient was educated that due to levels of cervical spine fused that full range of motion of the cervical spine is not expected, and that improvement in surrounding joint mobility and strength will be important to review for self-maintenance going forward. Patient may continue to benefit from skilled physical therapy to address aforementioned deficits.    Manisha Is progressing well towards her goals.   Pt prognosis is Fair.     Pt will continue to benefit from skilled outpatient physical therapy to address the deficits listed in the problem list box on initial evaluation, provide pt/family education and to maximize pt's level of independence in the home and community environment.     Pt's spiritual, cultural and educational needs considered and pt agreeable to plan of care and goals.     Anticipated barriers to physical therapy: scheduling, compliance    Goals:  Short Term Goals: 4 weeks   Patient will be independent with initial HEP to improve therapy outcomes Ongoing  Patient will  report consistently sleeping with cervical support roll in single pillow at night to improve sleep quality Ongoing  Patient will improve bilateral cervical spine AROM by >/=5 degrees to demonstrate improved cervical spine health Ongoing  Patient will report neck pain at rest as </=4/10 to demonstrate improved quality of life Met     Long Term Goals: 8 weeks   Patient will be independent with final HEP to maintain long term gains achieved in physical therapy Ongoing  Patient will report neck pain at rest as </=2/10 to demonstrate improved quality of life Ongoing  Patient will improve FOTO neck score to </=50% to demonstrate an improved in perceived functional status Ongoing  Patient will improve bilateral cervical rotation AROM to >/=45 degrees to be able to turn her head while reversing her car Ongoing  PLAN     Continue progressing per patient tolerance and POC.     Aditi Amador PT, DPT  Board Certified in Orthopedic Physical Therapy

## 2023-04-10 NOTE — PLAN OF CARE
"[facility]   Physical Therapy Treatment Note and Progress Note and Update to Plan of Care    Name: Manisha Wynne  Clinic Number: [id number]    Therapy Diagnosis:   Encounter Diagnoses   Name Primary?    Cervical myelopathy Yes    Pain in neck     Gait instability     Impaired functional mobility, balance, gait, and endurance      Physician: Sunitha Rosario P*  Visit Date: 4/5/2023  Physician Orders: PT Eval and Treat  Medical Diagnosis from Referral:  G95.9 (ICD-10-CM) - Cervical myelopathy  Evaluation Date: 2/24/2023  Authorization Period Expiration: 03/24/2023  Plan of Care Expiration: 04/24/2023 **Updated to 5/5/2023  Progress Note Due: 5/4/2023  Most Recent Progress Note: 4/5/2023  Visit # / Visits authorized: 13/20 (7 visits in episode)  FOTO: 1/3  PTA Visit #: 2/5   Time In: 2:00 pm  Time Out: 3:00 pm  Total Billable Time: 60 minutes  SUBJECTIVE   Patient reports she really "overdid" herself while lifting and moving her belongings to a different residence after last session, and she is feeling better at this time. She has been doing a lot of lifting lately. Her neck has been feeling pretty good overall. She is still feeling sore in the back of her neck, and she does feel like she can turn her head better.  She was not compliant with home exercise program.  Response to previous treatment: "good"  Functional change: Ongoing.  Pain: Soreness  Location: Left sided neck, scapular, and arm pain.  OBJECTIVE      Posture: Left shoulder girdle elevated in comparison to right, stands with sway back posture, increased thoracic kyphosis, and significant forward head posture that upon passive assistance in repositioning is unable to achieve full cervical retraction. Moderate left sided scoliosis in upper thoracic spine, with head sitting off center to right when viewed posteriorly.     Cervical Range of Motion (4/5/2023):     Degrees   Flexion 45 degrees   Extension 25 degrees " "  Right Rotation 40 degrees*   Left Rotation 25*   Right Side Bend 10 degrees   Left Side Bend 5 degrees   * = pain   Upper Extremity Strength/Myotomal Strength Testing:    Right Upper Extremity Left Upper Extremity   Shoulder Flexion 4+/5 4+/5   Shoulder Abduction 4/5 4/5   Shoulder External Rotation 4+/5 4+/5   Shoulder Internal Rotation 4+/5 4+/5   Elbow Flexion 4+/5 4+/5   Elbow Extension 4+/5 4+/5   C1-C2: Unable to fully side bend the cervical spine due to fusion; pure rotation utilized and measured at 40 degrees on right; 40 degrees on left  Shoulder Flexion: 150 degrees bilaterally  Treatment     Manisha received the treatments listed below  Therapeutic Exercise to develop strength, endurance, ROM, and posture for 24 minutes including:  Supine Cervical Rotations + Overpressure (gentle with pillowcase), 5x5" hold  Seated Self-Thoracic Extension Mobilizations, 1/2 foam roller 10x5" hold   Objective measures taken (see above)    Manisha received the following manual therapy techniques: Joint mobilizations, Soft tissue mobilizations, and Manual traction were applied for 16 minutes, including:  Cervical Rotational MET's, 3x8" bilaterally  OA Distraction Mobilizations, bilaterally, Grade II-III  Supine CTJ Posterior to Anterior Mobilizations    Did not perform the following manual:  C1-C2 Distraction Mobilizations, left-sided, Grade II-III  C1-C2 Sideglide Mobilizations, Grade II-III    Neuromuscular Re-Education activities to facilitate postural muscle for 20 minutes. The following activities were included:  Supine Chin Tucks, 10x10 with 10" hold, pillow underneath occipital region (post-manual treatment); cueing for proper technique  Supine T's and Y's: 2x12, Red TB   Seated Bilateral Shoulder ER with Scapular retract 10x5" hold YTB (not today)  Supine Horizontal Abduction, yellow band, one hand static, opposite hand concentric/eccentric    Patient Education and Home Exercises     Home Exercises Provided and " Patient Education Provided     Education provided:   - HEP Instruction  Written Home Exercises Provided: Patient instructed to cont prior HEP. Exercises were reviewed and Manisha was able to demonstrate them prior to the end of the session.  Manisha demonstrated fair  understanding of the education provided. See EMR under Patient Instructions for exercises provided during therapy sessions    ASSESSMENT   Manisha has undergone 7 visits in the care of her cervical spine post fusion of C3-C6. She has maintained improvement in upper cervical mobility with fairly good carry-over from session despite increased activity as noted with rotation at 40 -45 degrees this treatment session. She continues to demonstrate postural imbalance of upper cervical extension, lower cervical and thoracic flexion with protrusion of the head, and decreased segmental mobility of the upper cervical spine and thoracic spine, and decreased deep neck flexor endurance. Patient was educated that due to levels of cervical spine fused that full range of motion of the cervical spine is not expected, and that improvement in surrounding joint mobility and strength will be important to review for self-maintenance going forward. Patient may continue to benefit from skilled physical therapy to address aforementioned deficits.    Manisha Is progressing well towards her goals.   Pt prognosis is Fair.     Pt will continue to benefit from skilled outpatient physical therapy to address the deficits listed in the problem list box on initial evaluation, provide pt/family education and to maximize pt's level of independence in the home and community environment.     Pt's spiritual, cultural and educational needs considered and pt agreeable to plan of care and goals.     Anticipated barriers to physical therapy: scheduling, compliance    Goals:  Short Term Goals: 4 weeks   Patient will be independent with initial HEP to improve therapy outcomes Ongoing  Patient will  report consistently sleeping with cervical support roll in single pillow at night to improve sleep quality Ongoing  Patient will improve bilateral cervical spine AROM by >/=5 degrees to demonstrate improved cervical spine health Ongoing  Patient will report neck pain at rest as </=4/10 to demonstrate improved quality of life Met     Long Term Goals: 8 weeks   Patient will be independent with final HEP to maintain long term gains achieved in physical therapy Ongoing  Patient will report neck pain at rest as </=2/10 to demonstrate improved quality of life Ongoing  Patient will improve FOTO neck score to </=50% to demonstrate an improved in perceived functional status Ongoing  Patient will improve bilateral cervical rotation AROM to >/=45 degrees to be able to turn her head while reversing her car Ongoing  PLAN     Continue progressing per patient tolerance and POC.     Aditi Amador PT, DPT  Board Certified in Orthopedic Physical Therapy

## 2023-04-12 ENCOUNTER — CLINICAL SUPPORT (OUTPATIENT)
Dept: REHABILITATION | Facility: HOSPITAL | Age: 73
End: 2023-04-12
Payer: MEDICARE

## 2023-04-12 ENCOUNTER — OFFICE VISIT (OUTPATIENT)
Dept: URGENT CARE | Facility: CLINIC | Age: 73
End: 2023-04-12
Payer: MEDICARE

## 2023-04-12 VITALS
WEIGHT: 163 LBS | TEMPERATURE: 98 F | OXYGEN SATURATION: 99 % | HEIGHT: 68 IN | RESPIRATION RATE: 18 BRPM | DIASTOLIC BLOOD PRESSURE: 84 MMHG | BODY MASS INDEX: 24.71 KG/M2 | HEART RATE: 68 BPM | SYSTOLIC BLOOD PRESSURE: 124 MMHG

## 2023-04-12 DIAGNOSIS — G95.9 CERVICAL MYELOPATHY: Primary | ICD-10-CM

## 2023-04-12 DIAGNOSIS — Z74.09 IMPAIRED FUNCTIONAL MOBILITY, BALANCE, GAIT, AND ENDURANCE: ICD-10-CM

## 2023-04-12 DIAGNOSIS — S16.1XXA NECK STRAIN, INITIAL ENCOUNTER: ICD-10-CM

## 2023-04-12 DIAGNOSIS — Z98.890 HX OF NECK SURGERY: ICD-10-CM

## 2023-04-12 DIAGNOSIS — M54.6 ACUTE MIDLINE THORACIC BACK PAIN: Primary | ICD-10-CM

## 2023-04-12 DIAGNOSIS — M54.2 PAIN IN NECK: ICD-10-CM

## 2023-04-12 DIAGNOSIS — R26.81 GAIT INSTABILITY: ICD-10-CM

## 2023-04-12 PROCEDURE — 72070 XR THORACIC SPINE AP LATERAL: ICD-10-PCS | Mod: FY,S$GLB,, | Performed by: RADIOLOGY

## 2023-04-12 PROCEDURE — 72070 X-RAY EXAM THORAC SPINE 2VWS: CPT | Mod: FY,S$GLB,, | Performed by: RADIOLOGY

## 2023-04-12 PROCEDURE — 99214 OFFICE O/P EST MOD 30 MIN: CPT | Mod: S$GLB,,, | Performed by: NURSE PRACTITIONER

## 2023-04-12 PROCEDURE — 99214 PR OFFICE/OUTPT VISIT, EST, LEVL IV, 30-39 MIN: ICD-10-PCS | Mod: S$GLB,,, | Performed by: NURSE PRACTITIONER

## 2023-04-12 PROCEDURE — 97140 MANUAL THERAPY 1/> REGIONS: CPT | Mod: HCNC,PO

## 2023-04-12 PROCEDURE — 72040 X-RAY EXAM NECK SPINE 2-3 VW: CPT | Mod: FY,S$GLB,, | Performed by: RADIOLOGY

## 2023-04-12 PROCEDURE — 72040 XR CERVICAL SPINE AP LATERAL: ICD-10-PCS | Mod: FY,S$GLB,, | Performed by: RADIOLOGY

## 2023-04-12 RX ORDER — GABAPENTIN 100 MG/1
CAPSULE ORAL 2 TIMES DAILY
COMMUNITY
Start: 2022-12-15 | End: 2023-10-10 | Stop reason: SDUPTHER

## 2023-04-12 NOTE — PROGRESS NOTES
"Subjective:      Patient ID: Manisha Wynne is a 72 y.o. female.    Vitals:  height is 5' 8" (1.727 m) and weight is 73.9 kg (163 lb). Her oral temperature is 98 °F (36.7 °C). Her blood pressure is 124/84 and her pulse is 68. Her respiration is 18 and oxygen saturation is 99%.     Chief Complaint: Shoulder Injury    This is a 72 y.o. female who presents today with a chief complaint of Shoulder injury. Patient had a fall on last Wednesday injurying her mid back. Pain is from shoulder to shoulder. Patient has difficulty lying on her back.      Shoulder Injury   The incident occurred at home. Both shoulders are affected. The incident occurred more than 1 week ago. The injury mechanism was a fall. The pain is at a severity of 5/10 (seven when lying down). The pain is moderate. The symptoms are aggravated by movement. She has tried nothing for the symptoms. The treatment provided no relief.   ROS   Objective:     Vitals:    04/12/23 1245   BP: 124/84   Pulse: 68   Resp: 18   Temp: 98 °F (36.7 °C)   TempSrc: Oral   SpO2: 99%   Weight: 73.9 kg (163 lb)   Height: 5' 8" (1.727 m)       Physical Exam   Constitutional: She is oriented to person, place, and time. She appears well-developed. She is cooperative. No distress.   HENT:   Head: Normocephalic and atraumatic.   Nose: Nose normal.   Mouth/Throat: Oropharynx is clear and moist and mucous membranes are normal.   Eyes: Conjunctivae and lids are normal.   Neck: Trachea normal and phonation normal. Neck supple.   Cardiovascular: Normal rate, regular rhythm, normal heart sounds and normal pulses.   Pulmonary/Chest: Effort normal and breath sounds normal.   Abdominal: Normal appearance and bowel sounds are normal. She exhibits no mass. Soft.   Musculoskeletal:         General: Tenderness and signs of injury present. No deformity.      Comments: Midline thoracic spine tenderness   Noted kyphosis   No c-spine tenderness; paraspinal tenderness    Neurological: She is " alert and oriented to person, place, and time. She has normal strength and normal reflexes. No sensory deficit.   Skin: Skin is warm, dry, intact and not diaphoretic.   Psychiatric: Her speech is normal and behavior is normal. Judgment and thought content normal.   Nursing note and vitals reviewed.    Assessment:     1. Acute midline thoracic back pain    2. Neck strain, initial encounter    3. Hx of neck surgery      X-Ray Cervical Spine AP And Lateral    Result Date: 4/12/2023  EXAMINATION: XR CERVICAL SPINE AP LATERAL CLINICAL HISTORY: Strain of muscle, fascia and tendon at neck level, initial encounter TECHNIQUE: AP, lateral and open mouth views of the cervical spine were performed. COMPARISON: 02/02/2023 FINDINGS: Left subclavian pacing device, stable, postop laminectomy posterior spinal fusion C3 through C6, stable.  No hardware failure.  Anterior C1-C2, airway, neck soft tissues normal.  Osteopenia.     No fracture, subluxation or hardware failure. Electronically signed by: Elvis Rosa MD Date:    04/12/2023 Time:    14:15    XR THORACIC SPINE AP LATERAL    Result Date: 4/12/2023  EXAMINATION: XR THORACIC SPINE AP LATERAL CLINICAL HISTORY: Pain in thoracic spine TECHNIQUE: AP, lateral were performed of the thoracic spine. COMPARISON: None FINDINGS: AP, lateral radiographs of the thoracic spine demonstrate no evidence for acute fracture or dislocation.  Intervertebral disk spaces and vertebral body heights are well-preserved.  Mild multilevel degenerative changes with marginal osteophytes are noted.  Posterior cervical spine fusion is identified     No evidence of acute injury Electronically signed by: Josh Barba MD Date:    04/12/2023 Time:    13:59    Plan:       Acute midline thoracic back pain  -     XR THORACIC SPINE AP LATERAL; Future; Expected date: 04/12/2023    Neck strain, initial encounter  -     X-Ray Cervical Spine AP And Lateral; Future; Expected date: 04/12/2023    Hx of neck surgery  -      X-Ray Cervical Spine AP And Lateral; Future; Expected date: 04/12/2023             Patient Instructions   Warm moist compress as needed   Tylenol as needed for pain   Gentle massage and ROM exercise

## 2023-04-12 NOTE — PROGRESS NOTES
[facility]   Physical Therapy Treatment Note     Name: Manisha Wynne  Clinic Number: [id number]    Therapy Diagnosis:   Encounter Diagnoses   Name Primary?    Cervical myelopathy Yes    Pain in neck     Gait instability     Impaired functional mobility, balance, gait, and endurance      Physician: Sunitha Rosario P*  Visit Date: 4/12/2023  Physician Orders: PT Eval and Treat  Medical Diagnosis from Referral:  G95.9 (ICD-10-CM) - Cervical myelopathy  Evaluation Date: 2/24/2023  Authorization Period Expiration: 03/24/2023  Plan of Care Expiration: 04/24/2023 **Updated to 5/5/2023  Progress Note Due: 5/4/2023  Most Recent Progress Note: 4/5/2023  Visit # / Visits authorized: 14/20 (7 visits in episode)  FOTO: 1/3  PTA Visit #: 2/5   Time In: 9:15 am (15 minute late arrival)  Time Out: 10:00 am  Total Billable Time: 45 minutes  SUBJECTIVE   Patient reports last week she feel back onto her back while stumbling over construction in front of her house. Since then she has had pain in the midline of her thoracic spine. She has not been medically assessed at this time. She denies hitting her head.  She was not compliant with home exercise program.  Response to previous treatment: Improvement in turning her head to drive  Functional change: Ongoing.  Pain: Soreness  Location: Left sided neck, scapular, and arm pain.  OBJECTIVE    Palpation: Midline tenderness to T5 and T6, with pain noted during supine lying.    Cervical Range of Motion (4/5/2023):     Degrees   Right Rotation 40 degrees*   Left Rotation 40 degrees      Right Left   Biceps Brachii 2+           2+   Brachioradialis 2+           2+   Triceps Brachii 2+          2+   Patellar Tendon 1+          1+   Yamel's Tendon 1+          1+     Sensation: All cutaneous and dermatomal sensation is present and symmetrical.   Right Left   Hip Flexors 4/5 4/5   Knee Extension 3-/5 (5/5 within available motion) 3-/5 (5/5 within  "available motion)   Knee Flexion 4+/5 4+/5   Dorsiflexion 3-/5 (5/5 within available motion) (5/5 within available motion)     Treatment     Manisha received the treatments listed below  Therapeutic Exercise to develop strength, endurance, ROM, and posture for 00 minutes including:  Objective measures taken (see above)    Manisha received the following manual therapy techniques: Joint mobilizations, Soft tissue mobilizations, and Manual traction were applied for 45 minutes, including:  Objective measures taken (see above)  Cervical Rotational MET's, 3x8" bilaterally  Cervical Sidegliding Mobilizations, C1-C2, C2-C3, Grade III  Cervical Sidebending MET, 3x8" hold each side  Sub-Occipital Release  Education regarding the risk factors of potential fracture and the benefit of radiographic assessment to rule out fracture for safety.    Did not perform the following manual:  C1-C2 Distraction Mobilizations, left-sided, Grade II-III  C1-C2 Sideglide Mobilizations, Grade II-III    Neuromuscular Re-Education activities to facilitate postural muscle for 00 minutes. The following activities were included:    Patient Education and Home Exercises     Home Exercises Provided and Patient Education Provided     Education provided:   - HEP Instruction  Written Home Exercises Provided: Patient instructed to cont prior HEP. Exercises were reviewed and Manisha was able to demonstrate them prior to the end of the session.  Manisha demonstrated fair  understanding of the education provided. See EMR under Patient Instructions for exercises provided during therapy sessions    ASSESSMENT   Manisha was provided with a neurological assessment to ensure absence of neurological compromise from fall sustained one week prior. This exhibited no marked changes in deep tendon reflexes, sensation or lower extremity strength, or dermatomal sensation. She was noted to have pain with lying supine and midline tenderness and for this was strongly " encouraged to report to the nearest urgent care for radiographic assessment of the spine. She was noted to have no compromise to the cervical spine, and gentle MET for improved rotation and side bending ensued, without change in rotation from 40 degrees bilaterally. She verbalized undestanding of the safety precautions in obtaining further assessment of the spine after a fall.    Manisha Is progressing well towards her goals.   Pt prognosis is Fair.     Pt will continue to benefit from skilled outpatient physical therapy to address the deficits listed in the problem list box on initial evaluation, provide pt/family education and to maximize pt's level of independence in the home and community environment.     Pt's spiritual, cultural and educational needs considered and pt agreeable to plan of care and goals.     Anticipated barriers to physical therapy: scheduling, compliance    Goals:  Short Term Goals: 4 weeks   Patient will be independent with initial HEP to improve therapy outcomes Ongoing  Patient will report consistently sleeping with cervical support roll in single pillow at night to improve sleep quality Ongoing  Patient will improve bilateral cervical spine AROM by >/=5 degrees to demonstrate improved cervical spine health Ongoing  Patient will report neck pain at rest as </=4/10 to demonstrate improved quality of life Met     Long Term Goals: 8 weeks   Patient will be independent with final HEP to maintain long term gains achieved in physical therapy Ongoing  Patient will report neck pain at rest as </=2/10 to demonstrate improved quality of life Ongoing  Patient will improve FOTO neck score to </=50% to demonstrate an improved in perceived functional status Ongoing  Patient will improve bilateral cervical rotation AROM to >/=45 degrees to be able to turn her head while reversing her car Ongoing  PLAN     Patient encouraged to visit the urgent care for radiographic assessment secondary to a fall on her  back, risk factors for pathological fracture, and safety in continuing manual therapy for the spine in PT.    Aditi Amador PT, DPT  Board Certified in Orthopedic Physical Therapy

## 2023-04-17 ENCOUNTER — CLINICAL SUPPORT (OUTPATIENT)
Dept: REHABILITATION | Facility: HOSPITAL | Age: 73
End: 2023-04-17
Attending: NEUROLOGICAL SURGERY
Payer: MEDICARE

## 2023-04-17 DIAGNOSIS — Z74.09 IMPAIRED FUNCTIONAL MOBILITY, BALANCE, GAIT, AND ENDURANCE: ICD-10-CM

## 2023-04-17 DIAGNOSIS — R26.81 GAIT INSTABILITY: Primary | ICD-10-CM

## 2023-04-17 DIAGNOSIS — G95.9 CERVICAL MYELOPATHY: Primary | ICD-10-CM

## 2023-04-17 DIAGNOSIS — M54.2 PAIN IN NECK: ICD-10-CM

## 2023-04-17 PROCEDURE — 97110 THERAPEUTIC EXERCISES: CPT | Mod: HCNC,PO

## 2023-04-17 PROCEDURE — 97530 THERAPEUTIC ACTIVITIES: CPT | Mod: HCNC,PO

## 2023-04-17 PROCEDURE — 97112 NEUROMUSCULAR REEDUCATION: CPT | Mod: HCNC,PO

## 2023-04-17 PROCEDURE — 97140 MANUAL THERAPY 1/> REGIONS: CPT | Mod: HCNC,PO

## 2023-04-17 NOTE — PROGRESS NOTES
[facility]   Physical Therapy Treatment Note     Name: Manisha Wynne  Clinic Number: [id number]    Therapy Diagnosis:   Encounter Diagnoses   Name Primary?    Gait instability Yes    Impaired functional mobility, balance, gait, and endurance        Physician: Sunitha Rosario P*    Visit Date: 4/17/2023    Encounter Diagnoses   Name Primary?    Gait instability      Impaired functional mobility, balance, gait, and endurance Yes      Physician: Sunitha Rosario P*     Physician Orders: PT Eval and Treat Neuro Physical Therapy   Medical Diagnosis from Referral: R26.81 (ICD-10-CM) - Gait instability  Evaluation Date: 2/13/2023  Authorization Period Expiration: 6/6/23  Plan of Care Expiration: 4/3/23  Visit # / Visits authorized: 10/20    PTA Visit #: 0/5     Time In: 11:30  Time Out: 12:10  Total Billable Time:  40 minutes    SUBJECTIVE     Pt reports: Had a fall last week and went to urgent care to make sure no fracture, Xray was negative.  She is having trouble navigation the torn up sidewalk outside her house.   She was not compliant with home exercise program.  Response to previous treatment: I felt ok   Functional change: Ongoing    Pain: 3/10  Location: neck pain     OBJECTIVE     Objective Measures updated at progress report unless specified.   FOTO: 54%    Treatment     Manisha received the treatments listed below:      therapeutic exercises to develop strength, endurance, ROM, flexibility, posture, and core stabilization for 0 minutes including:      neuromuscular re-education activities to improve: Balance, Coordination, Kinesthetic, Sense, Proprioception, and Posture for 30 minutes. The following activities were included:    //Bars: with 1 upper extremity assist   2 x 30 sec Feet together eyes closed  1 x 30 sec tandem stance, each lower extremity infront    X 8 laps forward stepping over orange hurdles, gradual decrease of upper extremity assist  X 4 laps  lateral stepping over orange hurdles    X 30 sec hold with front foot on soft side of bosu, both sides  X 15 forward lunge onto soft side of bosu, both lower extremity   X 15 each side, step up onto soft side of bosu with opposite leg lift, 1 upper extremity assist  X 30 stabilization on firm side of bosu  X 15 lateral weight shifting on firm side of bosu         therapeutic activities to improve functional performance for 10 minutes, including: npt performed today    25 feet x 3 walking with head turns, contact guard  25 feet x 2 walking with head nods  25 feet x 2 narrow base of support walking, contact guard  25 feet x 2 backward walking      Patient Education and Home Exercises     Home Exercises Provided and Patient Education Provided     Education provided:   - home exercise program, attention to task,  printed previous HEP to focus on core and hip strengthening    Written Home Exercises Provided: yes. Exercises were reviewed and Manisha was able to demonstrate them prior to the end of the session.  Manisha demonstrated good  understanding of the education provided. See EMR under Patient Instructions for exercises provided during therapy sessions    ASSESSMENT   Ms. Wynne tolerated session well. Updated HEP for focus on standing balance, in addition to strengthening exercises.  She had increased path deviation and loss of balance with back wards walking and walking with head turns today, requires contact guard assist for activity.  She will continue to benefit from skilled Physical Therapy to address impairments and maximize mobility.     Manisha Is progressing well towards her goals.   Pt prognosis is Fair.     Pt will continue to benefit from skilled outpatient physical therapy to address the deficits listed in the problem list box on initial evaluation, provide pt/family education and to maximize pt's level of independence in the home and community environment.     Pt's spiritual, cultural and  educational needs considered and pt agreeable to plan of care and goals.     Anticipated barriers to physical therapy: co morbidities    Goals:   Short Term Goals: 6 weeks   Patient to perform HEP Independent  on going   Patient to score less than or equal to 45 sec on the 5 times sit to stand for improved transfers and functional strength on going   Patient to perform 20 min of aerobic activity atleast 3 times per week for improved endurance on going      Long Term Goals: 12 weeks   Patient to score greater than or equal 18/30 on the FGA for decreased fall risk on going   Patient to improved bilateral hip ext strength to 4/5 for improved core stability and improved standing on going   Patient to score greater than or equal to 68% on the ABC for improved balance confidence on going   Patient to score less than or equal to 11 sec on the TUG for improved gait and balance on going   PLAN     Continue to progress balance, strength and endurance    Sandra Rivera, PT

## 2023-04-17 NOTE — PROGRESS NOTES
[facility]   Physical Therapy Treatment Note     Name: Manisha Wynne  Clinic Number: [id number]    Therapy Diagnosis:   Encounter Diagnoses   Name Primary?    Cervical myelopathy Yes    Pain in neck      Physician: Sunitha Rosario P*  Visit Date: 4/17/2023  Physician Orders: PT Eval and Treat  Medical Diagnosis from Referral:  G95.9 (ICD-10-CM) - Cervical myelopathy  Evaluation Date: 2/24/2023  Authorization Period Expiration: 03/24/2023  Plan of Care Expiration: 04/24/2023 **Updated to 5/5/2023  Progress Note Due: 5/4/2023  Most Recent Progress Note: 4/5/2023  Visit # / Visits authorized: 14/20 (7 visits in episode)  FOTO: 1/3  PTA Visit #: 2/5   Time In: 12:20 pm  Time Out: 1:05 pm  Total Billable Time: 45 minutes  SUBJECTIVE   Patient reports she reported to urgent care as per advice of treating physical therapist on 4/12 to be assessed after sustaining a fall on her back. She is grateful that she was sent to the urgent care and is feeling much better.  She was not compliant with home exercise program.  Response to previous treatment: Improvement in turning her head to drive  Functional change: Ongoing.  Pain: Soreness  Location: Left sided neck, scapular, and arm pain.  OBJECTIVE   Radiographs (4/12): AP, lateral radiographs of the thoracic spine demonstrate no evidence for acute fracture or dislocation.  Intervertebral disk spaces and vertebral body heights are well-preserved.  Mild multilevel degenerative changes with marginal osteophytes are noted.  Posterior cervical spine fusion is identified  Impression:  No evidence of acute injury    EXAMINATION:  XR CERVICAL SPINE AP LATERAL  CLINICAL HISTORY:  Strain of muscle, fascia and tendon at neck level, initial encounter  TECHNIQUE:  AP, lateral and open mouth views of the cervical spine were performed.  COMPARISON:  02/02/2023  FINDINGS:  Left subclavian pacing device, stable, postop laminectomy posterior spinal fusion  "C3 through C6, stable.  No hardware failure.  Anterior C1-C2, airway, neck soft tissues normal.  Osteopenia.    Impression:  No fracture, subluxation or hardware failure.  Treatment     Lumbar AROM Pain/Dysfunction with movement   Flexion 75% Thoracic kyphosis present   Extension - Unable to achieve extension   Right Side Bending 50%    Left Side Bending 50%    Right Rotation 75%    Left Rotation 75%      Manisha received the treatments listed below  Therapeutic Exercise to develop strength, endurance, ROM, and posture for 10 minutes including:  Objective measures taken (see above)  Open Books, 10x5" hold, bilaterally    Manisha received the following manual therapy techniques: Joint mobilizations, Soft tissue mobilizations, and Manual traction were applied for 15 minutes, including:  OA Distraction Mobilization, bilaterally  C1-C2 Distraction Mobilization, bilaterally  MET for OA Flexion, bilaterally, 3x6" hold    Did not perform the following manual:  C1-C2 Distraction Mobilizations, left-sided, Grade II-III  C1-C2 Sideglide Mobilizations, Grade II-III  Cervical Rotational MET's, 3x8" bilaterally    Neuromuscular Re-Education activities to facilitate postural muscle for 10 minutes. The following activities were included:  Cervical Deep Neck Flexors Endurance with Biofeedback, 10x10" hold at 26 mmHg  Cervical Deep Neck Flexors Endurance with Biofeedback + Right Shoulder Flexion, 10x10" hold at 26 mmHg    Patient Education and Home Exercises     Home Exercises Provided and Patient Education Provided     Education provided:   - HEP Instruction  Written Home Exercises Provided: Patient instructed to cont prior HEP. Exercises were reviewed and Manisha was able to demonstrate them prior to the end of the session.  Manisha demonstrated fair  understanding of the education provided. See EMR under Patient Instructions for exercises provided during therapy sessions    ASSESSMENT   Manisha arrives today with continued " limitation into left cervical rotation greater than the right side, with approximately 15 degree difference. She continues to be limited in OA flexion, with manual therapy techniques improving range, albeit patient's natural posturing reverts to protruded cervical position. She arrived late to treatment and was accomodated.    Manisha Is progressing well towards her goals.   Pt prognosis is Fair.     Pt will continue to benefit from skilled outpatient physical therapy to address the deficits listed in the problem list box on initial evaluation, provide pt/family education and to maximize pt's level of independence in the home and community environment.     Pt's spiritual, cultural and educational needs considered and pt agreeable to plan of care and goals.     Anticipated barriers to physical therapy: scheduling, compliance    Goals:  Short Term Goals: 4 weeks   Patient will be independent with initial HEP to improve therapy outcomes Ongoing  Patient will report consistently sleeping with cervical support roll in single pillow at night to improve sleep quality Ongoing  Patient will improve bilateral cervical spine AROM by >/=5 degrees to demonstrate improved cervical spine health Ongoing  Patient will report neck pain at rest as </=4/10 to demonstrate improved quality of life Met     Long Term Goals: 8 weeks   Patient will be independent with final HEP to maintain long term gains achieved in physical therapy Ongoing  Patient will report neck pain at rest as </=2/10 to demonstrate improved quality of life Ongoing  Patient will improve FOTO neck score to </=50% to demonstrate an improved in perceived functional status Ongoing  Patient will improve bilateral cervical rotation AROM to >/=45 degrees to be able to turn her head while reversing her car Ongoing  PLAN     Patient encouraged to visit the urgent care for radiographic assessment secondary to a fall on her back, risk factors for pathological fracture, and safety  in continuing manual therapy for the spine in PT.    Aditi Amador PT, DPT  Board Certified in Orthopedic Physical Therapy

## 2023-04-19 ENCOUNTER — CLINICAL SUPPORT (OUTPATIENT)
Dept: REHABILITATION | Facility: HOSPITAL | Age: 73
End: 2023-04-19
Attending: INTERNAL MEDICINE
Payer: MEDICARE

## 2023-04-19 DIAGNOSIS — R26.81 GAIT INSTABILITY: Primary | ICD-10-CM

## 2023-04-19 DIAGNOSIS — Z74.09 IMPAIRED FUNCTIONAL MOBILITY, BALANCE, GAIT, AND ENDURANCE: ICD-10-CM

## 2023-04-19 DIAGNOSIS — G95.9 CERVICAL MYELOPATHY: Primary | ICD-10-CM

## 2023-04-19 DIAGNOSIS — M54.2 PAIN IN NECK: ICD-10-CM

## 2023-04-19 DIAGNOSIS — R26.81 GAIT INSTABILITY: ICD-10-CM

## 2023-04-19 PROCEDURE — 97140 MANUAL THERAPY 1/> REGIONS: CPT | Mod: HCNC,PO

## 2023-04-19 PROCEDURE — 97110 THERAPEUTIC EXERCISES: CPT | Mod: HCNC,PO

## 2023-04-19 PROCEDURE — 97112 NEUROMUSCULAR REEDUCATION: CPT | Mod: HCNC,PO

## 2023-04-19 PROCEDURE — 97530 THERAPEUTIC ACTIVITIES: CPT | Mod: HCNC,PO

## 2023-04-19 NOTE — PROGRESS NOTES
[facility]   Physical Therapy Treatment Note     Name: Manisha Wynne  Clinic Number: [id number]    Therapy Diagnosis:   Encounter Diagnoses   Name Primary?    Gait instability Yes    Impaired functional mobility, balance, gait, and endurance        Physician: Sunitha Rosario P*    Visit Date: 4/19/2023    Encounter Diagnoses   Name Primary?    Gait instability      Impaired functional mobility, balance, gait, and endurance Yes      Physician: Sunitha Rosario P*     Physician Orders: PT Eval and Treat Neuro Physical Therapy   Medical Diagnosis from Referral: R26.81 (ICD-10-CM) - Gait instability  Evaluation Date: 2/13/2023  Authorization Period Expiration: 6/6/23  Plan of Care Expiration: 4/3/23  Visit # / Visits authorized: 11/20    PTA Visit #: 0/5     Time In: 1022  Time Out: 1100  Total Billable Time:  38 minutes    SUBJECTIVE     Pt reports: She is doing well today, nothing new to report. Navigated the construction in front of her house well today.    She was not compliant with home exercise program.  Response to previous treatment: I felt ok   Functional change: Ongoing    Pain: 3/10  Location: neck pain     OBJECTIVE     Objective Measures updated at progress report unless specified.   FOTO: 54%    Treatment     Manisha received the treatments listed below:      therapeutic exercises to develop strength, endurance, ROM, flexibility, posture, and core stabilization for 0 minutes including:      neuromuscular re-education activities to improve: Balance, Coordination, Kinesthetic, Sense, Proprioception, and Posture for 28 minutes. The following activities were included:    //Bars: with no upper extremity assist   2 x 30 sec Feet together eyes closed  2 x 30 sec tandem stance, each lower extremity infront    X 8 laps forward stepping over sm hurdles, no UE support  X 4 laps lateral stepping over sm hurdles, no UE support    2 X 30 sec hold with front foot on soft  side of bosu, both sides  X 15 forward lunge onto soft side of bosu, both lower extremity   X 15 each LE, fwd step up onto soft side of bosu with opposite leg lift, 1 upper extremity assist  2 X 30 stabilization on firm side of bosu  1 minute lateral weight shifting on firm side of bosu         therapeutic activities to improve functional performance for 10 minutes, includin feet x 2 walking with head turns, contact guard  100 feet x 2 walking with head nods, CGA  75 feet narrow base of support walking, Jeff  75 feet backward walking, CGA      Patient Education and Home Exercises     Home Exercises Provided and Patient Education Provided     Education provided:   - home exercise program, attention to task,  printed previous HEP to focus on core and hip strengthening    Written Home Exercises Provided: yes. Exercises were reviewed and Manisha was able to demonstrate them prior to the end of the session.  Manisha demonstrated good  understanding of the education provided. See EMR under Patient Instructions for exercises provided during therapy sessions    ASSESSMENT   Ms. Wynne tolerated session well. She does well with head turn ambulation today, but requires increased assistance during NBOS ambulation. Overall she was able to tolerate increased activities in session today. She will continue to benefit from skilled Physical Therapy to address impairments and maximize mobility.     Manisha Is progressing well towards her goals.   Pt prognosis is Fair.     Pt will continue to benefit from skilled outpatient physical therapy to address the deficits listed in the problem list box on initial evaluation, provide pt/family education and to maximize pt's level of independence in the home and community environment.     Pt's spiritual, cultural and educational needs considered and pt agreeable to plan of care and goals.     Anticipated barriers to physical therapy: co morbidities    Goals:   Short Term Goals: 6  weeks   Patient to perform HEP Independent  on going   Patient to score less than or equal to 45 sec on the 5 times sit to stand for improved transfers and functional strength on going   Patient to perform 20 min of aerobic activity atleast 3 times per week for improved endurance on going      Long Term Goals: 12 weeks   Patient to score greater than or equal 18/30 on the FGA for decreased fall risk on going   Patient to improved bilateral hip ext strength to 4/5 for improved core stability and improved standing on going   Patient to score greater than or equal to 68% on the ABC for improved balance confidence on going   Patient to score less than or equal to 11 sec on the TUG for improved gait and balance on going   PLAN     Continue to progress balance, strength and endurance    Uriah Willett, PT

## 2023-04-19 NOTE — PROGRESS NOTES
[facility]   Physical Therapy Treatment Note     Name: Manisha Wynne  Clinic Number: [id number]    Therapy Diagnosis:   Encounter Diagnoses   Name Primary?    Cervical myelopathy Yes    Pain in neck     Gait instability     Impaired functional mobility, balance, gait, and endurance      Physician: Sunitha Rosario P*  Visit Date: 4/19/2023  Physician Orders: PT Eval and Treat  Medical Diagnosis from Referral:  G95.9 (ICD-10-CM) - Cervical myelopathy  Evaluation Date: 2/24/2023  Authorization Period Expiration: 03/24/2023  Plan of Care Expiration: 04/24/2023 **Updated to 5/5/2023  Progress Note Due: 5/4/2023  Most Recent Progress Note: 4/5/2023  Visit # / Visits authorized: 14/20 (7 visits in episode)  FOTO: 1/3  PTA Visit #: 2/5   Time In: 11:00 am  Time Out: 11:53 am  Total Billable Time: 53 minutes  SUBJECTIVE   She feels she is able to turn better this visit and notes that when driving on the left, she tends to turn her head more to the left, which is more restricted.  She was not compliant with home exercise program.  Response to previous treatment: Improvement in turning her head to drive  Functional change: Ongoing.  Pain: Soreness  Location: Left sided neck, scapular, and arm pain.  OBJECTIVE   Radiographs (4/12): AP, lateral radiographs of the thoracic spine demonstrate no evidence for acute fracture or dislocation.  Intervertebral disk spaces and vertebral body heights are well-preserved.  Mild multilevel degenerative changes with marginal osteophytes are noted.  Posterior cervical spine fusion is identified  Impression:  No evidence of acute injury    EXAMINATION:  XR CERVICAL SPINE AP LATERAL  CLINICAL HISTORY:  Strain of muscle, fascia and tendon at neck level, initial encounter  TECHNIQUE:  AP, lateral and open mouth views of the cervical spine were performed.  COMPARISON:  02/02/2023  FINDINGS:  Left subclavian pacing device, stable, postop laminectomy posterior  "spinal fusion C3 through C6, stable.  No hardware failure.  Anterior C1-C2, airway, neck soft tissues normal.  Osteopenia.    Impression:  No fracture, subluxation or hardware failure.  Treatment   Cervical (R) Rotation: 45 degrees (50 degrees prior to manual treatment)  Cervical (L) Rotation: 40 degrees (45 degrees prior to manual treatment)    Manisha received the treatments listed below  Therapeutic Exercise to develop strength, endurance, ROM, and posture for 23 minutes including:  UBE, 3 minutes forward/3 minutes back, Level 1  Seated Thoracic Extension Mobility, 15x5" hold  Repeated Cervical Retraction, 12x5" hold  Self Cervical Rotation C1 on C2 Mobilizations, 5x5" holds each side (cueing for proper engagement)    Manisha received the following manual therapy techniques: Joint mobilizations, Soft tissue mobilizations, and Manual traction were applied for 18 minutes, including:  C1-C2 Rotational MET, seated, 3x6" hold, bilaterally  Objective measurements taken (see above)    Neuromuscular Re-Education activities to facilitate postural muscle for 12 minutes. The following activities were included:  Seated Middle Trapezius, 12x3" hold  Wall Slides with Towel, cueing for proper technique    Patient Education and Home Exercises     Home Exercises Provided and Patient Education Provided     Education provided:   - HEP Instruction  Written Home Exercises Provided: Patient instructed to cont prior HEP. Exercises were reviewed and Manisha was able to demonstrate them prior to the end of the session.  Manisha demonstrated fair  understanding of the education provided. See EMR under Patient Instructions for exercises provided during therapy sessions    ASSESSMENT   Manisha was able to obtain +5 degree improvement in her bilateral cervical rotation range of motion this visit, with improved cervical retraction, albeit benefits from moderate cueing on avoiding cervical extension and protraction. She continues to " demonstrate forward flexed posture and bilaterally depressed and downwardly rotated scapula, with middle trapezius and upper trapezius/serratus activation this visit. She benefits from cueing to avoid loss of balance while attempting wall slides, and for this reason this was not provided for home.    Manisha Is progressing well towards her goals.   Pt prognosis is Fair.     Pt will continue to benefit from skilled outpatient physical therapy to address the deficits listed in the problem list box on initial evaluation, provide pt/family education and to maximize pt's level of independence in the home and community environment.     Pt's spiritual, cultural and educational needs considered and pt agreeable to plan of care and goals.     Anticipated barriers to physical therapy: scheduling, compliance    Goals:  Short Term Goals: 4 weeks   Patient will be independent with initial HEP to improve therapy outcomes Ongoing  Patient will report consistently sleeping with cervical support roll in single pillow at night to improve sleep quality Ongoing  Patient will improve bilateral cervical spine AROM by >/=5 degrees to demonstrate improved cervical spine health Ongoing  Patient will report neck pain at rest as </=4/10 to demonstrate improved quality of life Met     Long Term Goals: 8 weeks   Patient will be independent with final HEP to maintain long term gains achieved in physical therapy Ongoing  Patient will report neck pain at rest as </=2/10 to demonstrate improved quality of life Ongoing  Patient will improve FOTO neck score to </=50% to demonstrate an improved in perceived functional status Ongoing  Patient will improve bilateral cervical rotation AROM to >/=45 degrees to be able to turn her head while reversing her car Ongoing  PLAN   Continue to progress as per plan of care, with discharge in the next 1-2 visits.    Aditi Amador PT, DPT  Board Certified in Orthopedic Physical Therapy

## 2023-04-24 ENCOUNTER — CLINICAL SUPPORT (OUTPATIENT)
Dept: REHABILITATION | Facility: HOSPITAL | Age: 73
End: 2023-04-24
Attending: NEUROLOGICAL SURGERY
Payer: MEDICARE

## 2023-04-24 DIAGNOSIS — Z74.09 IMPAIRED FUNCTIONAL MOBILITY, BALANCE, GAIT, AND ENDURANCE: ICD-10-CM

## 2023-04-24 DIAGNOSIS — R26.81 GAIT INSTABILITY: Primary | ICD-10-CM

## 2023-04-24 DIAGNOSIS — R29.3 POSTURE ABNORMALITY: ICD-10-CM

## 2023-04-24 DIAGNOSIS — E78.2 MIXED HYPERLIPIDEMIA: ICD-10-CM

## 2023-04-24 PROCEDURE — 97110 THERAPEUTIC EXERCISES: CPT | Mod: HCNC,PO

## 2023-04-24 PROCEDURE — 97112 NEUROMUSCULAR REEDUCATION: CPT | Mod: HCNC,PO

## 2023-04-24 PROCEDURE — 97140 MANUAL THERAPY 1/> REGIONS: CPT | Mod: HCNC,PO

## 2023-04-24 PROCEDURE — 97530 THERAPEUTIC ACTIVITIES: CPT | Mod: HCNC,PO

## 2023-04-24 RX ORDER — LOSARTAN POTASSIUM 25 MG/1
25 TABLET ORAL DAILY
Qty: 90 TABLET | Refills: 6 | Status: SHIPPED | OUTPATIENT
Start: 2023-04-24

## 2023-04-24 RX ORDER — PRAVASTATIN SODIUM 20 MG/1
TABLET ORAL
Qty: 90 TABLET | Refills: 3 | Status: SHIPPED | OUTPATIENT
Start: 2023-04-24 | End: 2023-12-10

## 2023-04-24 NOTE — PROGRESS NOTES
[facility]   Physical Therapy Treatment Note     Name: Manisha Wynne  Clinic Number: [id number]    Therapy Diagnosis:   Encounter Diagnoses   Name Primary?    Gait instability Yes    Impaired functional mobility, balance, gait, and endurance          Physician: Sunitha Rosario P*    Visit Date: 2023    Encounter Diagnoses   Name Primary?    Gait instability      Impaired functional mobility, balance, gait, and endurance Yes      Physician: Sunitha Rosario P*     Physician Orders: PT Eval and Treat Neuro Physical Therapy   Medical Diagnosis from Referral: R26.81 (ICD-10-CM) - Gait instability  Evaluation Date: 23 (2023)  Authorization Period Expiration: 23  Plan of Care Expiration: 23 (4/3/23)  Visit # / Visits authorized:     PTA Visit #: 0/5     Time In: 1115  Time Out: 1210  Total Billable Time:  55 minutes    SUBJECTIVE     Pt reports: She is doing well today, nothing new to report. She had to get on the floor to reach some boxes at home.     She was not compliant with home exercise program.  Response to previous treatment: I felt ok   Functional change: Ongoing    Pain: 3/10  Location: neck pain     OBJECTIVE     Objective Measures updated at progress report unless specified.   FOTO: 57%    Treatment     Manisha received the treatments listed below:      therapeutic exercises to develop strength, endurance, ROM, flexibility, posture, and core stabilization for 15 minutes including:  FOTO: 57%  5 times sit to stand: 23 sec, no upper extremity assist   TU sec  Functional Gait Assessment:   1. Gait on level surface =  1 (8 sec)   (3) Normal: less than 5.5 sec, no A.D., no imbalance, normal gait pattern, deviates< 6in   (2) Mild impairment: 7-5.6 sec, uses A.D., mild gait deviations, or deviates 6-10 in   (1) Moderate impairment: > 7 sec, slow speed, imbalance, deviates 10-15 in.   (0) Severe impairment: needs assist, deviates >15 in,  reach/touch wall  2. Change in Gait Speed = 3   (3) Normal: smooth change w/o loss of balance or gait deviation, deviates < 6 in, significant difference between speeds   (2) Mild impairment: changes speed, but demonstrates mild gait deviations, deviates 6-10 in, OR no deviations but unable to significantly speed, OR uses A.D.   (1) Moderate impairment: minor changes to speed, OR changes speed w/ significant deviations, deviates 10-15 in, OR  Changes speed , but loses balance & recovers   (0) Severe impairment: cannot change speed, deviates >15 in, or loses balance & needs assist  3. Gait with horizontal head turns  = 2   (3) Normal: no change in gait, deviates <6 in   (2) Mild impairment: slight change in speed, deviates 6-10 in, OR uses A.D.   (1) Moderate impairment: moderate change in speed, deviates 10-15 in   (0) Severe impairment: severe disruption of gait, deviates >15in  4. Gait with vertical head turns = 2   (3) Normal: no change in gait, deviates <6 in   (2) Mild impairment: slight change in speed, deviates 6-10 in OR uses A.D.   (1) Moderate impairment: moderate change in speed, deviates 10-15 in   (0) Severe impairment: severe disruption of gait, deviates >15 in  5. Gait with pivot turns = 2   (3) Normal: performs safely in 3 sec, no LOB   (2) Mild impairment: performs in >3 sec & no LOB, OR turns safely & requires several steps to regain LOB   (1) Moderate impairment: turns slow, OR requires several small steps for balance following turn & stop   (0) Severe impairment: cannot turn safely, needs assist  6. Step over obstacle = 2   (3) Normal: steps over 2 stacked boxes w/o change in speed or LOB   (2) Mild impairment: able to step over 1 box w/o change in speed or LOB   (1) Moderate impairment: steps over 1 box but must slow down, may require VC   (0) Severe impairment: cannot perform w/o assist  7. Gait with Narrow MANOHAR = 1   (3) Normal: 10 steps no staggering   (2) Mild impairment: 7-9 steps   (1)  Moderate impairment: 4-7 steps   (0) Severe impairment: < 4 steps or cannot perform w/o assist  8. Gait with eyes closed = 1   (3) Normal: < 7 sec, no A.D., no LOB, normal gait pattern, deviates <6 in   (2) Mild impairment: 7.1-9 sec, mild gait deviations, deviates 6-10 in   (1) Moderate impairment: > 9 sec, abnormal pattern, LOB, deviates 10-15 in   (0) Severe impairment: cannot perform w/o assist, LOB, deviates >15in  9. Ambulating Backwards = 1   (3) Normal: no A.D., no LOB, normal gait pattern, deviates <6in   (2) Mild impairment: uses A.D., slower speed, mild gait deviations, deviates 6-10 in   (1) Moderate impairment: slow speed, abnormal gait pattern, LOB, deviates 10-15 in   (0) Severe impairment: severe gait deviations or LOB, deviates >15in  10. Steps = 2   (3) Normal: alternating feet, no rail   (2) Mild Impairment: alternating feet, uses rail   (1) Moderate impairment: step-to, uses rail   (0) Severe impairment: cannot perform safely    Score 17/30     Score:   <22/30 fall risk   <20/30 fall risk in older adults   <18/30 fall risk in Parkinsons      neuromuscular re-education activities to improve: Balance, Coordination, Kinesthetic, Sense, Proprioception, and Posture for 30 minutes. The following activities were included:    //Bars: with no upper extremity assist     X 6 laps forward stepping over sm hurdles, no UE support  X 4 laps lateral stepping over sm hurdles, no UE support    2 x 30 sec tandem stance on foam pad, each lower extremity in front    Quadruped rocking x 10   Quadruped bird/dog x 5   Quadruped alternating lower extremity ext x 6 each side  Tall kneel hip flex to hip ext x 5      therapeutic activities to improve functional performance for 10 minutes, including:     Floor transfer x 2 trials with stand by assist  From seated on chair position to blue mat on floor using half kneel to get down and up.  Uses left leg to push off when going to stand, unable to perform with right lower  extremity.         Patient Education and Home Exercises     Home Exercises Provided and Patient Education Provided     Education provided:   - home exercise program, attention to task,  printed previous HEP to focus on core and hip strengthening    Written Home Exercises Provided: yes. Exercises were reviewed and Manisha was able to demonstrate them prior to the end of the session.  Manisha demonstrated good  understanding of the education provided. See EMR under Patient Instructions for exercises provided during therapy sessions    ASSESSMENT   Ms. Wynne tolerated session well. She has made steady progress toward her goals.  She scored 23 seconds on the 5 times sit to stand today without upper extremity assist, indicating improved transfers and functional strength.  She scored 14 sec on the TUG and 17/30 on the FGA indicating improved dynamic balance.  She is partially compliant with HEP and requires encouragement for continued exercise.  She was able to safely perform floor transfer today with chair assist for 2 trials.  She will continue to benefit from skilled Physical Therapy to address impairments and maximize mobility.     Manisha Is progressing well towards her goals.   Pt prognosis is Fair.     Pt will continue to benefit from skilled outpatient physical therapy to address the deficits listed in the problem list box on initial evaluation, provide pt/family education and to maximize pt's level of independence in the home and community environment.     Pt's spiritual, cultural and educational needs considered and pt agreeable to plan of care and goals.     Anticipated barriers to physical therapy: co morbidities    Goals:   Short Term Goals: 6 weeks   Patient to perform HEP Independent  Progressing 4/24/23  Patient to score less than or equal to 45 sec on the 5 times sit to stand for improved transfers and functional strength Met 4/24/23  Patient to perform 20 min of aerobic activity atleast 3 times per  week for improved endurance Progressing 4/24/23      Long Term Goals: 12 weeks   Patient to score greater than or equal 18/30 on the FGA for decreased fall risk Progressing 4/24/23  Patient to improved bilateral hip ext strength to 4/5 for improved core stability and improved standing on going   Patient to score greater than or equal to 68% on the ABC for improved balance confidence on going   Patient to score less than or equal to 11 sec on the TUG for improved gait and balance Progressing 4/24/23  PLAN     Continue Physical Therapy for 1 times per week for 8 weeks for therex, strength, neuro re education, balance, gait, endurance, therapeutic activity    Sandra Rivera, PT

## 2023-04-24 NOTE — PROGRESS NOTES
[facility]   Physical Therapy Treatment Note     Name: Manisha Wynne  Clinic Number: [id number]    Therapy Diagnosis:   No diagnosis found.    Physician: Sunitha Rosario P*  Visit Date: 4/24/2023  Physician Orders: PT Eval and Treat  Medical Diagnosis from Referral:  G95.9 (ICD-10-CM) - Cervical myelopathy  Evaluation Date: 2/24/2023  Authorization Period Expiration: 03/24/2023  Plan of Care Expiration: 04/24/2023 **Updated to 5/5/2023  Progress Note Due: 5/4/2023  Most Recent Progress Note: 4/5/2023  Visit # / Visits authorized: 15/20 (7 visits in episode)  FOTO: 1/3  PTA Visit #: 2/5   Time In: 11:00 am  Time Out: 11:53 am  Total Billable Time: 53 minutes  SUBJECTIVE   She feels she is able to turn better this visit and notes that when driving on the left, she tends to turn her head more to the left, which is more restricted.  She was not compliant with home exercise program.  Response to previous treatment: Improvement in turning her head to drive  Functional change: Ongoing.  Pain: Soreness  Location: Left sided neck, scapular, and arm pain.  OBJECTIVE   Radiographs (4/12): AP, lateral radiographs of the thoracic spine demonstrate no evidence for acute fracture or dislocation.  Intervertebral disk spaces and vertebral body heights are well-preserved.  Mild multilevel degenerative changes with marginal osteophytes are noted.  Posterior cervical spine fusion is identified  Impression:  No evidence of acute injury    EXAMINATION:  XR CERVICAL SPINE AP LATERAL  CLINICAL HISTORY:  Strain of muscle, fascia and tendon at neck level, initial encounter  TECHNIQUE:  AP, lateral and open mouth views of the cervical spine were performed.  COMPARISON:  02/02/2023  FINDINGS:  Left subclavian pacing device, stable, postop laminectomy posterior spinal fusion C3 through C6, stable.  No hardware failure.  Anterior C1-C2, airway, neck soft tissues normal.  Osteopenia.    Impression:  No  "fracture, subluxation or hardware failure.  Treatment   Cervical (R) Rotation: 45 degrees (50 degrees prior to manual treatment)  Cervical (L) Rotation: 40 degrees (45 degrees prior to manual treatment)    Manisha received the treatments listed below  Therapeutic Exercise to develop strength, endurance, ROM, and posture for 23 minutes including:  UBE, 3 minutes forward/3 minutes back, Level 1  Seated Thoracic Extension Mobility, 15x5" hold  Repeated Cervical Retraction, 12x5" hold  Self Cervical Rotation C1 on C2 Mobilizations, 5x5" holds each side (cueing for proper engagement)    Manisha received the following manual therapy techniques: Joint mobilizations, Soft tissue mobilizations, and Manual traction were applied for 18 minutes, including:  C1-C2 Rotational MET, seated, 3x6" hold, bilaterally  Objective measurements taken (see above)    Neuromuscular Re-Education activities to facilitate postural muscle for 12 minutes. The following activities were included:  Seated Middle Trapezius, 12x3" hold  Wall Slides with Towel, cueing for proper technique    Patient Education and Home Exercises     Home Exercises Provided and Patient Education Provided     Education provided:   - HEP Instruction  Written Home Exercises Provided: Patient instructed to cont prior HEP. Exercises were reviewed and Manisha was able to demonstrate them prior to the end of the session.  Manisha demonstrated fair  understanding of the education provided. See EMR under Patient Instructions for exercises provided during therapy sessions    ASSESSMENT   Manisha was able to obtain +5 degree improvement in her bilateral cervical rotation range of motion this visit, with improved cervical retraction, albeit benefits from moderate cueing on avoiding cervical extension and protraction. She continues to demonstrate forward flexed posture and bilaterally depressed and downwardly rotated scapula, with middle trapezius and upper trapezius/serratus " activation this visit. She benefits from cueing to avoid loss of balance while attempting wall slides, and for this reason this was not provided for home.    Manisha Is progressing well towards her goals.   Pt prognosis is Fair.     Pt will continue to benefit from skilled outpatient physical therapy to address the deficits listed in the problem list box on initial evaluation, provide pt/family education and to maximize pt's level of independence in the home and community environment.     Pt's spiritual, cultural and educational needs considered and pt agreeable to plan of care and goals.     Anticipated barriers to physical therapy: scheduling, compliance    Goals:  Short Term Goals: 4 weeks   Patient will be independent with initial HEP to improve therapy outcomes Ongoing  Patient will report consistently sleeping with cervical support roll in single pillow at night to improve sleep quality Ongoing  Patient will improve bilateral cervical spine AROM by >/=5 degrees to demonstrate improved cervical spine health Ongoing  Patient will report neck pain at rest as </=4/10 to demonstrate improved quality of life Met     Long Term Goals: 8 weeks   Patient will be independent with final HEP to maintain long term gains achieved in physical therapy Ongoing  Patient will report neck pain at rest as </=2/10 to demonstrate improved quality of life Ongoing  Patient will improve FOTO neck score to </=50% to demonstrate an improved in perceived functional status Ongoing  Patient will improve bilateral cervical rotation AROM to >/=45 degrees to be able to turn her head while reversing her car Ongoing  PLAN   Continue to progress as per plan of care, with discharge in the next 1-2 visits.    Aditi Amador PT, DPT  Board Certified in Orthopedic Physical Therapy

## 2023-04-24 NOTE — PLAN OF CARE
[facility]   Physical Therapy Treatment Note     Name: Manisha Wynne  Clinic Number: [id number]    Therapy Diagnosis:   Encounter Diagnoses   Name Primary?    Gait instability Yes    Impaired functional mobility, balance, gait, and endurance          Physician: Sunitha Rosario P*    Visit Date: 2023    Encounter Diagnoses   Name Primary?    Gait instability      Impaired functional mobility, balance, gait, and endurance Yes      Physician: Sunitha Rosario P*     Physician Orders: PT Eval and Treat Neuro Physical Therapy   Medical Diagnosis from Referral: R26.81 (ICD-10-CM) - Gait instability  Evaluation Date: 23 (2023)  Authorization Period Expiration: 23  Plan of Care Expiration: 23 (4/3/23)  Visit # / Visits authorized:     PTA Visit #: 0/5     Time In: 1115  Time Out: 1210  Total Billable Time:  55 minutes    SUBJECTIVE     Pt reports: She is doing well today, nothing new to report. She had to get on the floor to reach some boxes at home.     She was not compliant with home exercise program.  Response to previous treatment: I felt ok   Functional change: Ongoing    Pain: 3/10  Location: neck pain     OBJECTIVE     Objective Measures updated at progress report unless specified.   FOTO: 57%    Treatment     Manisha received the treatments listed below:      therapeutic exercises to develop strength, endurance, ROM, flexibility, posture, and core stabilization for 15 minutes including:  FOTO: 57%  5 times sit to stand: 23 sec, no upper extremity assist   TU sec  Functional Gait Assessment:   1. Gait on level surface =  1 (8 sec)   (3) Normal: less than 5.5 sec, no A.D., no imbalance, normal gait pattern, deviates< 6in   (2) Mild impairment: 7-5.6 sec, uses A.D., mild gait deviations, or deviates 6-10 in   (1) Moderate impairment: > 7 sec, slow speed, imbalance, deviates 10-15 in.   (0) Severe impairment: needs assist, deviates >15 in,  reach/touch wall  2. Change in Gait Speed = 3   (3) Normal: smooth change w/o loss of balance or gait deviation, deviates < 6 in, significant difference between speeds   (2) Mild impairment: changes speed, but demonstrates mild gait deviations, deviates 6-10 in, OR no deviations but unable to significantly speed, OR uses A.D.   (1) Moderate impairment: minor changes to speed, OR changes speed w/ significant deviations, deviates 10-15 in, OR  Changes speed , but loses balance & recovers   (0) Severe impairment: cannot change speed, deviates >15 in, or loses balance & needs assist  3. Gait with horizontal head turns  = 2   (3) Normal: no change in gait, deviates <6 in   (2) Mild impairment: slight change in speed, deviates 6-10 in, OR uses A.D.   (1) Moderate impairment: moderate change in speed, deviates 10-15 in   (0) Severe impairment: severe disruption of gait, deviates >15in  4. Gait with vertical head turns = 2   (3) Normal: no change in gait, deviates <6 in   (2) Mild impairment: slight change in speed, deviates 6-10 in OR uses A.D.   (1) Moderate impairment: moderate change in speed, deviates 10-15 in   (0) Severe impairment: severe disruption of gait, deviates >15 in  5. Gait with pivot turns = 2   (3) Normal: performs safely in 3 sec, no LOB   (2) Mild impairment: performs in >3 sec & no LOB, OR turns safely & requires several steps to regain LOB   (1) Moderate impairment: turns slow, OR requires several small steps for balance following turn & stop   (0) Severe impairment: cannot turn safely, needs assist  6. Step over obstacle = 2   (3) Normal: steps over 2 stacked boxes w/o change in speed or LOB   (2) Mild impairment: able to step over 1 box w/o change in speed or LOB   (1) Moderate impairment: steps over 1 box but must slow down, may require VC   (0) Severe impairment: cannot perform w/o assist  7. Gait with Narrow MANOHAR = 1   (3) Normal: 10 steps no staggering   (2) Mild impairment: 7-9 steps   (1)  Moderate impairment: 4-7 steps   (0) Severe impairment: < 4 steps or cannot perform w/o assist  8. Gait with eyes closed = 1   (3) Normal: < 7 sec, no A.D., no LOB, normal gait pattern, deviates <6 in   (2) Mild impairment: 7.1-9 sec, mild gait deviations, deviates 6-10 in   (1) Moderate impairment: > 9 sec, abnormal pattern, LOB, deviates 10-15 in   (0) Severe impairment: cannot perform w/o assist, LOB, deviates >15in  9. Ambulating Backwards = 1   (3) Normal: no A.D., no LOB, normal gait pattern, deviates <6in   (2) Mild impairment: uses A.D., slower speed, mild gait deviations, deviates 6-10 in   (1) Moderate impairment: slow speed, abnormal gait pattern, LOB, deviates 10-15 in   (0) Severe impairment: severe gait deviations or LOB, deviates >15in  10. Steps = 2   (3) Normal: alternating feet, no rail   (2) Mild Impairment: alternating feet, uses rail   (1) Moderate impairment: step-to, uses rail   (0) Severe impairment: cannot perform safely    Score 17/30     Score:   <22/30 fall risk   <20/30 fall risk in older adults   <18/30 fall risk in Parkinsons      neuromuscular re-education activities to improve: Balance, Coordination, Kinesthetic, Sense, Proprioception, and Posture for 30 minutes. The following activities were included:    //Bars: with no upper extremity assist     X 6 laps forward stepping over sm hurdles, no UE support  X 4 laps lateral stepping over sm hurdles, no UE support    2 x 30 sec tandem stance on foam pad, each lower extremity in front    Quadruped rocking x 10   Quadruped bird/dog x 5   Quadruped alternating lower extremity ext x 6 each side  Tall kneel hip flex to hip ext x 5      therapeutic activities to improve functional performance for 10 minutes, including:     Floor transfer x 2 trials with stand by assist  From seated on chair position to blue mat on floor using half kneel to get down and up.  Uses left leg to push off when going to stand, unable to perform with right lower  extremity.         Patient Education and Home Exercises     Home Exercises Provided and Patient Education Provided     Education provided:   - home exercise program, attention to task,  printed previous HEP to focus on core and hip strengthening    Written Home Exercises Provided: yes. Exercises were reviewed and Manisha was able to demonstrate them prior to the end of the session.  Manisha demonstrated good  understanding of the education provided. See EMR under Patient Instructions for exercises provided during therapy sessions    ASSESSMENT   Ms. Wynne tolerated session well. She has made steady progress toward her goals.  She scored 23 seconds on the 5 times sit to stand today without upper extremity assist, indicating improved transfers and functional strength.  She scored 14 sec on the TUG and 17/30 on the FGA indicating improved dynamic balance.  She is partially compliant with HEP and requires encouragement for continued exercise.  She was able to safely perform floor transfer today with chair assist for 2 trials.  She will continue to benefit from skilled Physical Therapy to address impairments and maximize mobility.     Manisha Is progressing well towards her goals.   Pt prognosis is Fair.     Pt will continue to benefit from skilled outpatient physical therapy to address the deficits listed in the problem list box on initial evaluation, provide pt/family education and to maximize pt's level of independence in the home and community environment.     Pt's spiritual, cultural and educational needs considered and pt agreeable to plan of care and goals.     Anticipated barriers to physical therapy: co morbidities    Goals:   Short Term Goals: 6 weeks   Patient to perform HEP Independent  Progressing 4/24/23  Patient to score less than or equal to 45 sec on the 5 times sit to stand for improved transfers and functional strength Met 4/24/23  Patient to perform 20 min of aerobic activity atleast 3 times per  week for improved endurance Progressing 4/24/23      Long Term Goals: 12 weeks   Patient to score greater than or equal 18/30 on the FGA for decreased fall risk Progressing 4/24/23  Patient to improved bilateral hip ext strength to 4/5 for improved core stability and improved standing on going   Patient to score greater than or equal to 68% on the ABC for improved balance confidence on going   Patient to score less than or equal to 11 sec on the TUG for improved gait and balance Progressing 4/24/23  PLAN     Continue Physical Therapy for 1 times per week for 8 weeks for therex, strength, neuro re education, balance, gait, endurance, therapeutic activity    Sandra Rivera, PT

## 2023-04-25 PROBLEM — R29.3 POSTURE ABNORMALITY: Status: ACTIVE | Noted: 2023-04-25

## 2023-04-25 NOTE — PROGRESS NOTES
"[facility]   Physical Therapy Treatment Note     Name: Manisha Wynne  Clinic Number: [id number]    Therapy Diagnosis:   Encounter Diagnosis   Name Primary?    Posture abnormality        Physician: Sunitha Rosario P*  Visit Date: 4/24/2023  Physician Orders: PT Eval and Treat  Medical Diagnosis from Referral:  G95.9 (ICD-10-CM) - Cervical myelopathy  Evaluation Date: 2/24/2023  Authorization Period Expiration: 03/24/2023  Plan of Care Expiration: 04/24/2023 **Updated to 5/5/2023  Progress Note Due: 5/4/2023  Most Recent Progress Note: 4/5/2023  Visit # / Visits authorized: 20/40 (11 visits in episode)  FOTO: 1/3  PTA Visit #: 2/5     Time In: 1010am  Time Out: 1100am  Total Billable Time: 50 minutes    SUBJECTIVE   She feels pretty good at this time. She was a bit sore after her treatment session last week but overall is feeling good today.  She was not compliant with home exercise program.  Response to previous treatment: Improvement in turning her head to drive  Functional change: Ongoing.  Pain: Soreness  Location: Left sided neck, scapular, and arm pain.  OBJECTIVE         Treatment     Manisha received the treatments listed below:    Therapeutic Exercise to develop strength, endurance, ROM, and posture for 23 minutes including:    UBE, 3 minutes forward/3 minutes back, Level 1  Seated Thoracic Extension Mobility, 20x5" hold  Repeated Cervical Retraction, 2 x 10 with 5" hold  Self Cervical Rotation C1 on C2 Mobilizations, 10x5" holds each side (cueing for proper engagement)    Manisha received the following manual therapy techniques: Joint mobilizations, Soft tissue mobilizations, and Manual traction were applied for 13 minutes, including:    C1-C2 Rotational MET, supine, 3x6" hold, bilaterally  Upper cervical flexion mobs, grade III  Prone CT junction and thoracic spine mobs, grade III    Neuromuscular Re-Education activities to facilitate postural muscle for 14 minutes. " "The following activities were included:    Seated Middle Trapezius, 12x3" hold  Wall Slides with Towel, cueing for proper technique, 3 x 8  B shoulder ER with RTB (standing against wall for posture cue), 3 x 8    Patient Education and Home Exercises     Home Exercises Provided and Patient Education Provided     Education provided:   - HEP Instruction  Written Home Exercises Provided: Patient instructed to cont prior HEP. Exercises were reviewed and Manisha was able to demonstrate them prior to the end of the session.  Manisha demonstrated fair  understanding of the education provided. See EMR under Patient Instructions for exercises provided during therapy sessions    ASSESSMENT     Manisha continues to display within session improvement in her cervical spine rotation ROM following manual interventions and exercises. Increased time was spent on manual therapy to improve mobility of her upper thoracic spine today and this was tolerated well. She continues to be challenged with scapular strengthening and postural muscle reeducation to maximize her postural control throughout her day.     Manisha Is progressing well towards her goals.   Pt prognosis is Fair.     Pt will continue to benefit from skilled outpatient physical therapy to address the deficits listed in the problem list box on initial evaluation, provide pt/family education and to maximize pt's level of independence in the home and community environment.     Pt's spiritual, cultural and educational needs considered and pt agreeable to plan of care and goals.     Anticipated barriers to physical therapy: scheduling, compliance    Goals:  Short Term Goals: 4 weeks   Patient will be independent with initial HEP to improve therapy outcomes Ongoing  Patient will report consistently sleeping with cervical support roll in single pillow at night to improve sleep quality Ongoing  Patient will improve bilateral cervical spine AROM by >/=5 degrees to demonstrate " improved cervical spine health Ongoing  Patient will report neck pain at rest as </=4/10 to demonstrate improved quality of life Met     Long Term Goals: 8 weeks   Patient will be independent with final HEP to maintain long term gains achieved in physical therapy Ongoing  Patient will report neck pain at rest as </=2/10 to demonstrate improved quality of life Ongoing  Patient will improve FOTO neck score to </=50% to demonstrate an improved in perceived functional status Ongoing  Patient will improve bilateral cervical rotation AROM to >/=45 degrees to be able to turn her head while reversing her car Ongoing  PLAN   Continue to progress as per plan of care, with discharge in the next 1-2 visits.    Hayden Yates PT, DPT  Board Certified in Orthopedic Physical Therapy

## 2023-04-26 ENCOUNTER — DOCUMENTATION ONLY (OUTPATIENT)
Dept: REHABILITATION | Facility: HOSPITAL | Age: 73
End: 2023-04-26

## 2023-04-26 ENCOUNTER — CLINICAL SUPPORT (OUTPATIENT)
Dept: REHABILITATION | Facility: HOSPITAL | Age: 73
End: 2023-04-26
Attending: NEUROLOGICAL SURGERY
Payer: MEDICARE

## 2023-04-26 DIAGNOSIS — Z74.09 IMPAIRED FUNCTIONAL MOBILITY, BALANCE, GAIT, AND ENDURANCE: ICD-10-CM

## 2023-04-26 DIAGNOSIS — R26.81 GAIT INSTABILITY: Primary | ICD-10-CM

## 2023-04-26 DIAGNOSIS — R29.3 POSTURE ABNORMALITY: Primary | ICD-10-CM

## 2023-04-26 PROCEDURE — 97112 NEUROMUSCULAR REEDUCATION: CPT | Mod: HCNC,PO

## 2023-04-26 PROCEDURE — 97110 THERAPEUTIC EXERCISES: CPT | Mod: HCNC,PO

## 2023-04-26 PROCEDURE — 97140 MANUAL THERAPY 1/> REGIONS: CPT | Mod: HCNC,PO

## 2023-04-26 PROCEDURE — 97530 THERAPEUTIC ACTIVITIES: CPT | Mod: HCNC,PO

## 2023-04-26 NOTE — PROGRESS NOTES
Outpatient Therapy Discharge Summary     Name: Manisha Wynne  Clinic Number: [id number]    Therapy Diagnosis:   Encounter Diagnosis   Name Primary?    Posture abnormality Yes     Physician: Sunitha Rosario P*  Physician Orders: PT Eval and Treat  Medical Diagnosis: G95.9 (ICD-10-CM) - Cervical myelopathy  Evaluation Date: 02/24/2023    Date of Last visit: 04/26/2023  Total Visits Received: 12  Cancelled Visits: 3  No Show Visits: 0    Assessment      Manisha Wynne is a 72 year old female referred to physical therapy due to neck pain with a diagnosis of cervical myelopathy. The patient has been seen for 12 visits including her initial evaluation on 02/24/2023 and has made good progress, meeting all of her short term and 3 of her 4 long term physical therapy goals. Subjectively the patient is no primarily complaining of neck and arm soreness as opposed to the pain she had been experiencing when starting physical therapy. She is reporting improved ability to turn her head to either side, thought does continue to report increased soreness with end-range right rotation. Objectively the patient is demonstrating improved cervical spine rotation ROM bilaterally, and improved her Neck Disability Index score from 59% functional limitation to 47% functional limitation. Manisha has developed a long term home exercise program for postural strength and cervicothoracic spine mobility over the course of this episode of care, with which she is now independent, and intends to continue performing at least 3x/week to maintain the gains she has achieved. She has no further skilled physical therapy needs and is discharged to home with self care at this time.    Goals:  Short Term Goals: 4 weeks  Patient will be independent with initial HEP to improve therapy outcomes (Met)  Patient will report consistently sleeping with cervical support roll in single pillow at night to improve sleep quality (Met)  Patient will improve  bilateral cervical spine AROM by >/=5 degrees to demonstrate improved cervical spine health (Met)  Patient will report neck pain at rest as </=4/10 to demonstrate improved quality of life (Met)     Long Term Goals: 8 weeks  Patient will be independent with final HEP to maintain long term gains achieved in physical therapy (Met)  Patient will report neck pain at rest as </=2/10 to demonstrate improved quality of life (Met)  Patient will improve FOTO neck score to </=50% to demonstrate an improved in perceived functional status (Met)  Patient will improve bilateral cervical rotation AROM to >/=45 degrees to be able to turn her head while reversing her car (Not Met, Excellent Progress)    Discharge Reason: Patient has completed the physician's prescription and has reached her maximum rehab potential.    Plan     This patient is discharged from skilled physical therapy services to home with self care and final home exercise program.    Lenny Delgado, PT, DPT

## 2023-04-26 NOTE — PROGRESS NOTES
[facility]   Physical Therapy Treatment and Progress Note     Name: Manisha Wynne  Clinic Number: [id number]    Therapy Diagnosis:   Encounter Diagnosis   Name Primary?    Posture abnormality Yes     Physician: Sunitha Rosario P*  Visit Date: 4/26/2023  Physician Orders: PT Eval and Treat  Medical Diagnosis from Referral:  G95.9 (ICD-10-CM) - Cervical myelopathy  Evaluation Date: 02/24/2023  Authorization Period Expiration: 03/24/2023  Plan of Care Expiration: 05/05/2023  Progress Note Due: 05/04/2023  Visit # / Visits authorized: 22/40 (12 visits in episode)  FOTO: 1/3  PTA Visit #: 0/5    Time In: 9:05 am  Time Out: 10:00 am  Total Billable Time: 55 minutes    SUBJECTIVE     Since starting physical therapy the patient reports that she is now able to turn her head to the side better, though still has to be cautious due to limited mobility. She is now sleeping on a single pillow at night and is no longer being woken up due to neck pain.    She was compliant with home exercise program.  Response to previous treatment: No issues  Functional change: Improved sleep quality and ability to turn head while driving  Pain: Soreness  Location: Left sided neck, scapular, and arm pain    OBJECTIVE     Cervical Range of Motion:     Degrees   Flexion 30   Extension 37   Right Rotation 45*   Left Rotation 38   Right Side Bend 20   Left Side Bend 20   * = pain    Dermatomal Sensation - Light Touch:    Right Upper Extremity Left Upper Extremity   C3 Normal Normal   C4 Normal Normal   C5 Normal Normal   C6 Normal Normal   C7 Normal Normal   C8 Normal Normal   T1 Normal Normal     Deep Tendon Reflexes:    Right Upper Extremity Left Upper Extremity   Biceps Brachii (C5/C6) 3+ 3+   Triceps Brachii (C7) 3+ 3+     Limitation/Restriction for FOTO Neck Survey    Therapist reviewed FOTO scores for Manisha Wynne on 4/26/2023.   FOTO documents entered into EPIC - see Media  "section.    Limitation Score: 53%       Treatment     Manisha received the treatments listed below:    Therapeutic Exercise to develop strength, endurance, ROM, and posture for 30 minutes including:  Subjective/Objective Measurements (15 minutes)  UBE, 3 minutes forward/3 minutes back, level 1.0  Seated Thoracic Extension Mobility, 20x5" hold  Repeated Cervical Retraction, 2 x 10 with 5" hold  Patient Education (See Below)    Manual Therapy techniques: Joint mobilizations, Soft tissue mobilizations, and Manual traction were applied for 10 minutes, including:  Upper cervical flexion mobs, grade III  Prone CT junction and thoracic spine mobs, grade III    Neuromuscular Re-Education activities to facilitate postural muscle for 15 minutes. The following activities were included:  Seated Middle Trapezius, 12x3" hold  Wall Slides with Towel, cueing for proper technique, 2x10  B shoulder ER with RTB (standing against wall for posture cue), 2x10    Patient Education and Home Exercises     Home Exercises Provided and Patient Education Provided   Education provided:   - Final HEP Review    Written Home Exercises Provided: Patient instructed to cont prior HEP. Exercises were reviewed and Manisha was able to demonstrate them prior to the end of the session.  Manisha demonstrated fair  understanding of the education provided. See EMR under Patient Instructions for exercises provided during therapy sessions    ASSESSMENT     Manisha Wynne is a 72 year old female referred to physical therapy due to neck pain with a diagnosis of cervical myelopathy. The patient has been seen for 12 visits including her initial evaluation on 02/24/2023 and has made good progress, meeting all of her short term and 3 of her 4 long term physical therapy goals. Subjectively the patient is no primarily complaining of neck and arm soreness as opposed to the pain she had been experiencing when starting physical therapy. She is reporting improved " ability to turn her head to either side, thought does continue to report increased soreness with end-range right rotation. Objectively the patient is demonstrating improved cervical spine rotation ROM bilaterally, and improved her Neck Disability Index score from 59% functional limitation to 47% functional limitation. Manisha has developed a long term home exercise program for postural strength and cervicothoracic spine mobility over the course of this episode of care, with which she is now independent, and intends to continue performing at least 3x/week to maintain the gains she has achieved. She has no further skilled physical therapy needs and is discharged to home with self care at this time.    Goals:  Short Term Goals: 4 weeks  Patient will be independent with initial HEP to improve therapy outcomes (Met)  Patient will report consistently sleeping with cervical support roll in single pillow at night to improve sleep quality (Met)  Patient will improve bilateral cervical spine AROM by >/=5 degrees to demonstrate improved cervical spine health (Met)  Patient will report neck pain at rest as </=4/10 to demonstrate improved quality of life (Met)    Long Term Goals: 8 weeks  Patient will be independent with final HEP to maintain long term gains achieved in physical therapy (Met)  Patient will report neck pain at rest as </=2/10 to demonstrate improved quality of life (Met)  Patient will improve FOTO neck score to </=50% to demonstrate an improved in perceived functional status (Met)  Patient will improve bilateral cervical rotation AROM to >/=45 degrees to be able to turn her head while reversing her car (Not Met, Excellent Progress)    PLAN     Discharge to home with self care and final home exercise program.    Lenny Delgado, PT, DPT

## 2023-04-26 NOTE — PROGRESS NOTES
[facility]   Physical Therapy Treatment Note     Name: Manisha Wynne  Clinic Number: [id number]    Therapy Diagnosis:   Encounter Diagnoses   Name Primary?    Gait instability Yes    Impaired functional mobility, balance, gait, and endurance        Physician: Sunitha Rosario P*    Visit Date: 4/26/2023    Encounter Diagnoses   Name Primary?    Gait instability      Impaired functional mobility, balance, gait, and endurance Yes      Physician: Sunitha Rosario P*     Physician Orders: PT Eval and Treat Neuro Physical Therapy   Medical Diagnosis from Referral: R26.81 (ICD-10-CM) - Gait instability  Evaluation Date: 4/24/23 (2/13/2023)  Authorization Period Expiration: 6/6/23  Plan of Care Expiration: 6/24/23 (4/3/23)  Visit # / Visits authorized: 12/20    PTA Visit #: 0/5     Time In: 10:15  Time Out: 11:00  Total Billable Time:  45 minutes    SUBJECTIVE     Pt reports: She graduated from downstairs today, feels like her neck is moving better.   She was not compliant with home exercise program.  Response to previous treatment: I felt ok   Functional change: Ongoing    Pain: 3/10  Location: neck pain     OBJECTIVE     Objective Measures updated at progress report unless specified.   FOTO: 57%    Treatment     Manisha received the treatments listed below:      therapeutic exercises to develop strength, endurance, ROM, flexibility, posture, and core stabilization for 8 minutes including:  Leg Press   3 X 10 both lower extremity with 90 lbs   X 10 single leg with 60 lb, each lower extremity     neuromuscular re-education activities to improve: Balance, Coordination, Kinesthetic, Sense, Proprioception, and Posture for 12 minutes. The following activities were included:    //Bars: with no upper extremity assist     Quadruped rocking x 10   Quadruped bird/dog x 5   Quadruped alternating lower extremity ext x 6 each side  Tall kneel hip flex to hip ext x 5  Half kneeling  stabilization with upper extremity assist on chair, 45 sec each side    /78  HR 60    therapeutic activities to improve functional performance for 25 minutes, including:     Floor transfer x 2 trials with stand by assist  From seated on chair position to blue mat on floor using half kneel to get down and up.  Uses left leg to push off when going to stand, unable to perform with right lower extremity.     Sit to stand 2 x 5, no upper extremity assist     100 feet x 2 walking with head turns  100 feet x 2 walking with head nods   100 feet backward walking      Patient Education and Home Exercises     Home Exercises Provided and Patient Education Provided     Education provided:   - home exercise program, attention to task, discussed fall prevention at home and strategies to get up from the floor if she does fall.     Written Home Exercises Provided: yes. Exercises were reviewed and Manisha was able to demonstrate them prior to the end of the session.  Manisha demonstrated good  understanding of the education provided. See EMR under Patient Instructions for exercises provided during therapy sessions    ASSESSMENT   Ms. Wynne tolerated session well. She had more challenge performing floor transfer today with chair assist, required 2 attempts but able to perform stand by assist.  She reports some dizziness during session but notes she did not have breakfast this morning which is likely contributing.  She demo's occasional path deviation with walking with head turns but able to self correct.  Performed leg press today for focus on lower extremity strengthening.  She will continue to benefit from skilled Physical Therapy to address impairments and maximize mobility.     Manisha Is progressing well towards her goals.   Pt prognosis is Fair.     Pt will continue to benefit from skilled outpatient physical therapy to address the deficits listed in the problem list box on initial evaluation, provide pt/family  education and to maximize pt's level of independence in the home and community environment.     Pt's spiritual, cultural and educational needs considered and pt agreeable to plan of care and goals.     Anticipated barriers to physical therapy: co morbidities    Goals:   Short Term Goals: 6 weeks   Patient to perform HEP Independent  Progressing 4/24/23  Patient to score less than or equal to 45 sec on the 5 times sit to stand for improved transfers and functional strength Met 4/24/23  Patient to perform 20 min of aerobic activity atleast 3 times per week for improved endurance Progressing 4/24/23      Long Term Goals: 12 weeks   Patient to score greater than or equal 18/30 on the FGA for decreased fall risk Progressing 4/24/23  Patient to improved bilateral hip ext strength to 4/5 for improved core stability and improved standing on going   Patient to score greater than or equal to 68% on the ABC for improved balance confidence on going   Patient to score less than or equal to 11 sec on the TUG for improved gait and balance Progressing 4/24/23  PLAN     Continue Physical Therapy for 1 times per week for 8 weeks for therex, strength, neuro re education, balance, gait, endurance, therapeutic activity  Gait better for obstacle negotiation    Sandra Rivera, PT

## 2023-04-27 ENCOUNTER — TELEPHONE (OUTPATIENT)
Dept: NEUROSURGERY | Facility: CLINIC | Age: 73
End: 2023-04-27
Payer: MEDICARE

## 2023-05-03 ENCOUNTER — CLINICAL SUPPORT (OUTPATIENT)
Dept: REHABILITATION | Facility: HOSPITAL | Age: 73
End: 2023-05-03
Attending: NEUROLOGICAL SURGERY
Payer: MEDICARE

## 2023-05-03 DIAGNOSIS — Z74.09 IMPAIRED FUNCTIONAL MOBILITY, BALANCE, GAIT, AND ENDURANCE: ICD-10-CM

## 2023-05-03 DIAGNOSIS — R26.81 GAIT INSTABILITY: Primary | ICD-10-CM

## 2023-05-03 PROCEDURE — 97112 NEUROMUSCULAR REEDUCATION: CPT | Mod: HCNC,PO,CQ

## 2023-05-03 PROCEDURE — 97110 THERAPEUTIC EXERCISES: CPT | Mod: HCNC,PO,CQ

## 2023-05-03 NOTE — PROGRESS NOTES
[facility]   Physical Therapy Treatment Note     Name: Manisha Wynne  Clinic Number: [id number]    Therapy Diagnosis:   No diagnosis found.      Physician: Sunitha Rosario P*    Visit Date: 5/3/2023    Encounter Diagnoses   Name Primary?    Gait instability      Impaired functional mobility, balance, gait, and endurance Yes      Physician: Sunitha Rosario P*     Physician Orders: PT Eval and Treat Neuro Physical Therapy   Medical Diagnosis from Referral: R26.81 (ICD-10-CM) - Gait instability  Evaluation Date: 4/24/23 (2/13/2023)  Authorization Period Expiration: 6/6/23  Plan of Care Expiration: 6/24/23 (4/3/23)  Visit # / Visits authorized: 13/20    PTA Visit #: 1/5     Time In: 11:15  Time Out: 12:00  Total Billable Time:  45 minutes    SUBJECTIVE     Pt reports: She graduated from downstairs today, feels like her neck is moving better.   She was not compliant with home exercise program.  Response to previous treatment: I felt ok   Functional change: Ongoing    Pain: 3/10  Location: neck pain     OBJECTIVE     Objective Measures updated at progress report unless specified.   FOTO: 57%    Treatment     Manisha received the treatments listed below:      therapeutic exercises to develop strength, endurance, ROM, flexibility, posture, and core stabilization for 0 minutes including:  Leg Press  Not performed today   3 X 10 both lower extremity with 90 lbs   X 10 single leg with 60 lb, each lower extremity     neuromuscular re-education activities to improve: Balance, Coordination, Kinesthetic, Sense, Proprioception, and Posture for 25 minutes. The following activities were included:    //Bars: with no upper extremity assist     Quadruped rocking x 1 minute    Quadruped bird/dog x 10   Quadruped alternating lower extremity ext x 6 each side  From Tall kneeling   to hip moving into Half kneeling  x 5 bilateral  No upper extremity support CGA   Half kneeling stabilization  with upper extremity Paloff press x10 reps bilateral GTB      therapeutic activities to improve functional performance for 20 minutes, including:     Floor transfer x 1 trial with stand by assist  From seated on chair position to blue mat on floor using half kneel to get down and up.  Uses right leg to push off when going to stand    Sit to stand  onto 4 point fitter 2 x 5, no upper extremity assist (cues for anterior weight shifts:    100 feet x 2 walking with head turns  100 feet x 2 walking with head nods   30  feet backward walking with head turns CGA  30  feet backward walking with head nodsCGA      Patient Education and Home Exercises     Home Exercises Provided and Patient Education Provided     Education provided:   - home exercise program, attention to task, discussed fall prevention at home and strategies to get up from the floor if she does fall.     Written Home Exercises Provided: yes. Exercises were reviewed and Manisha was able to demonstrate them prior to the end of the session.  Manisha demonstrated good  understanding of the education provided. See EMR under Patient Instructions for exercises provided during therapy sessions    ASSESSMENT   Ms. Wynne tolerated session well. Continued floor transfers this time using bilateral upper extremity mat for push up assistance. She was however to push from her right lower extremity.  Denied dizziness this morning. She exhibits occasional veering but able to self correct without LOB.   She will continue to benefit from skilled Physical Therapy to address impairments and maximize mobility.     Manisha Is progressing well towards her goals.   Pt prognosis is Fair.     Pt will continue to benefit from skilled outpatient physical therapy to address the deficits listed in the problem list box on initial evaluation, provide pt/family education and to maximize pt's level of independence in the home and community environment.     Pt's spiritual, cultural and  educational needs considered and pt agreeable to plan of care and goals.     Anticipated barriers to physical therapy: co morbidities    Goals:   Short Term Goals: 6 weeks   Patient to perform HEP Independent  Progressing 4/24/23  Patient to score less than or equal to 45 sec on the 5 times sit to stand for improved transfers and functional strength Met 4/24/23  Patient to perform 20 min of aerobic activity atleast 3 times per week for improved endurance Progressing 4/24/23      Long Term Goals: 12 weeks   Patient to score greater than or equal 18/30 on the FGA for decreased fall risk Progressing 4/24/23  Patient to improved bilateral hip ext strength to 4/5 for improved core stability and improved standing on going   Patient to score greater than or equal to 68% on the ABC for improved balance confidence on going   Patient to score less than or equal to 11 sec on the TUG for improved gait and balance Progressing 4/24/23  PLAN     Continue Physical Therapy for 1 times per week for 8 weeks for therex, strength, neuro re education, balance, gait, endurance, therapeutic activity  Gait better for obstacle negotiation    Uche Gipson, PTA

## 2023-05-04 ENCOUNTER — HOSPITAL ENCOUNTER (OUTPATIENT)
Dept: RADIOLOGY | Facility: HOSPITAL | Age: 73
Discharge: HOME | End: 2023-05-04
Attending: NEUROLOGICAL SURGERY
Payer: MEDICARE

## 2023-05-04 ENCOUNTER — OFFICE VISIT (OUTPATIENT)
Dept: NEUROSURGERY | Facility: CLINIC | Age: 73
End: 2023-05-04
Payer: MEDICARE

## 2023-05-04 VITALS
RESPIRATION RATE: 16 BRPM | TEMPERATURE: 98 F | SYSTOLIC BLOOD PRESSURE: 160 MMHG | HEART RATE: 60 BPM | DIASTOLIC BLOOD PRESSURE: 84 MMHG

## 2023-05-04 DIAGNOSIS — M47.12 CERVICAL SPONDYLOSIS WITH MYELOPATHY: ICD-10-CM

## 2023-05-04 DIAGNOSIS — G95.9 CERVICAL MYELOPATHY: Primary | ICD-10-CM

## 2023-05-04 DIAGNOSIS — Z98.1 S/P CERVICAL SPINAL FUSION: ICD-10-CM

## 2023-05-04 PROCEDURE — 99214 PR OFFICE/OUTPT VISIT, EST, LEVL IV, 30-39 MIN: ICD-10-PCS | Mod: S$GLB,,, | Performed by: NEUROLOGICAL SURGERY

## 2023-05-04 PROCEDURE — 3077F PR MOST RECENT SYSTOLIC BLOOD PRESSURE >= 140 MM HG: ICD-10-PCS | Mod: CPTII,S$GLB,, | Performed by: NEUROLOGICAL SURGERY

## 2023-05-04 PROCEDURE — 4010F ACE/ARB THERAPY RXD/TAKEN: CPT | Mod: CPTII,S$GLB,, | Performed by: NEUROLOGICAL SURGERY

## 2023-05-04 PROCEDURE — 1157F ADVNC CARE PLAN IN RCRD: CPT | Mod: CPTII,S$GLB,, | Performed by: NEUROLOGICAL SURGERY

## 2023-05-04 PROCEDURE — 72125 CT NECK SPINE W/O DYE: CPT | Mod: TC,HCNC

## 2023-05-04 PROCEDURE — 99999 PR PBB SHADOW E&M-EST. PATIENT-LVL III: CPT | Mod: PBBFAC,,, | Performed by: NEUROLOGICAL SURGERY

## 2023-05-04 PROCEDURE — 99214 OFFICE O/P EST MOD 30 MIN: CPT | Mod: S$GLB,,, | Performed by: NEUROLOGICAL SURGERY

## 2023-05-04 PROCEDURE — 1126F PR PAIN SEVERITY QUANTIFIED, NO PAIN PRESENT: ICD-10-PCS | Mod: CPTII,S$GLB,, | Performed by: NEUROLOGICAL SURGERY

## 2023-05-04 PROCEDURE — 1159F PR MEDICATION LIST DOCUMENTED IN MEDICAL RECORD: ICD-10-PCS | Mod: CPTII,S$GLB,, | Performed by: NEUROLOGICAL SURGERY

## 2023-05-04 PROCEDURE — 1159F MED LIST DOCD IN RCRD: CPT | Mod: CPTII,S$GLB,, | Performed by: NEUROLOGICAL SURGERY

## 2023-05-04 PROCEDURE — 3077F SYST BP >= 140 MM HG: CPT | Mod: CPTII,S$GLB,, | Performed by: NEUROLOGICAL SURGERY

## 2023-05-04 PROCEDURE — 3079F DIAST BP 80-89 MM HG: CPT | Mod: CPTII,S$GLB,, | Performed by: NEUROLOGICAL SURGERY

## 2023-05-04 PROCEDURE — 4010F PR ACE/ARB THEARPY RXD/TAKEN: ICD-10-PCS | Mod: CPTII,S$GLB,, | Performed by: NEUROLOGICAL SURGERY

## 2023-05-04 PROCEDURE — 72125 CT NECK SPINE W/O DYE: CPT | Mod: 26,HCNC,, | Performed by: RADIOLOGY

## 2023-05-04 PROCEDURE — 72125 CT CERVICAL SPINE WITHOUT CONTRAST: ICD-10-PCS | Mod: 26,HCNC,, | Performed by: RADIOLOGY

## 2023-05-04 PROCEDURE — 1157F PR ADVANCE CARE PLAN OR EQUIV PRESENT IN MEDICAL RECORD: ICD-10-PCS | Mod: CPTII,S$GLB,, | Performed by: NEUROLOGICAL SURGERY

## 2023-05-04 PROCEDURE — 3079F PR MOST RECENT DIASTOLIC BLOOD PRESSURE 80-89 MM HG: ICD-10-PCS | Mod: CPTII,S$GLB,, | Performed by: NEUROLOGICAL SURGERY

## 2023-05-04 PROCEDURE — 99999 PR PBB SHADOW E&M-EST. PATIENT-LVL III: ICD-10-PCS | Mod: PBBFAC,,, | Performed by: NEUROLOGICAL SURGERY

## 2023-05-04 PROCEDURE — 1126F AMNT PAIN NOTED NONE PRSNT: CPT | Mod: CPTII,S$GLB,, | Performed by: NEUROLOGICAL SURGERY

## 2023-05-04 NOTE — PROGRESS NOTES
"  Neurosurgery  Follow up     SUBJECTIVE:     Chief Complaint: "here for neck evaluation"     History of Present Illness:  Manisha Wynne is a 72 y.o. female (former  at [employer]) who presents as a referral from Dr. Mcduffie for evaluation of cervical myelopathy. The patient reports that she has had several falls from [id number], which prompted her to seek neurologic evaluation. The falls have resulted in injuries, including breaking her wrist. She also has numbness, tingling, and burning in her feet. She denies pain in her neck, but endorses in her mid- and low back. Her pain is 10/10 in the legs, 10/10 in the back, and 0/10 in the neck and arms. The pain is intermittent. It is made worse by sitting. It improves with standing. She denies any change in bowel/bladder habit.     She endorses handwriting changes, dropping objects, and balance/gait difficulty. She denies fine motor changes. She has not seen pain management for her neck, but she is seeing Dr. Booker for "sciatica."     CT of the cervical spine (the patient has a non-MRI-conditional compatible pacemaker in place) was obtained and personally reviewed, demonstrating significant stenosis at C5-C6>C4-C5. Flex ex shows some mild instability of C3 on C4.     She lives alone with her tuxedo cats. Her family is in [place] and [facility]. She does have friends nearby who will help to care for her in perioperative periods. She takes ASA 81 daily.     As of 5/6/21, the patient had the myelogram as requested. This is personally reviewed and demonstrates significant cord compression and OPLL. There is some stenosis at L4-L5 as well.     The patient reports that she has been well. She has had no falls. She has been doing PT for her sciatica, which she has found helpful. She does note that her tremor, which may be progressing slightly, is now interfering with her applying makeup at times.     As of 5/25/21, the patient reports that she has not had any significant " changes. She presents today with a list of questions, together with friend Kavya Roberts on the phone. She also endorses a several year history of urinary incontinence (episodes occur overnight or with coughing) for which she was going to participate in pelvic floor rehab and see urogyn; however, this was interrupted by Covid.     As of 1/11/22, the patient reports that she has had ongoing C. Diff since we scheduled surgery. She was seen about a potential colonoscopy today, but was advised against proceeding. She is due for a CT of the abdomen/pelvis next week to look at the pancreas. She is also being considered for a fecal transplant. There is also a clinical trial going on in which she's interested.     Dr. Maradiaga of ID has been quarterbacking the C. Diff response. The patient is frustrated by her lack of improvement.     She is less fatigued now than when she initially became sick, but she does not feel she has yet recovered to her baseline. She is also being recommended for cataract surgery and pacemaker generator replacement in about 13 months.     As of 8/4/22, the patient reports that she is finally over her C. Diff. 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 She notes that she has been really careful. She walks very slowly. She has noticed some tremor in her hands at rest. This gets better when she holds onto an object. She denies anosmia. She denies significant sleep difficulties and other non-motor symptoms of PD.     As of 12/22/22, the patient underwent C3-C6 posterior cervical decompression and fusion on 11/14/22. She reports that she has been doing well since surgery. She has NOT been using a bone growth stimulator. She denies fever, chills, or drainage from the incision. She has no complaints of C. Diff. She is eager to return to work in the 's office.     As of 2/2/23, the patient returns in scheduled follow-up. She reports she has been well. She is walking 6000  steps several times a week. She has returned to work, though she has been a bit more forgetful about dates and times. We discussed Dr. Cordero.     She saw Sunitha this morning, who is starting outpatient PT.     Bone growth stimulator was received, and she has been using it.     She reports that she had a mechanical trip and fall in which her head didn't hit the ground last week.     As of 5/4/23, the patient returns in scheduled postoperative follow up. She reports that she has one additional month of PT.     She reports that she has had 3 falls since we saw each other last, but all of them have been mechanical. One she slipped on water, one she was getting up from the floor, once when walking on uneven ground (sidewalk was dug up). She notes that she moved about a month ago.      She occasionally has headaches. She has occasional neck pain in the left upper portion, but this has been improving. The cats have been well. She is back at work 2 days a week.      Review of patient's allergies indicates:  No Known Allergies    Current Outpatient Medications   Medication Sig Dispense Refill    acetaminophen (TYLENOL) 500 MG tablet Take 2 tablets (1,000 mg total) by mouth every 8 (eight) hours.  0    buPROPion (WELLBUTRIN XL) 300 MG 24 hr tablet Take 1 tablet (300 mg total) by mouth once daily. 30 tablet 0    calcium-vitamin D tablet 600 mg-200 units Take 1 tablet by mouth once daily. 30 tablet 0    cyanocobalamin (VITAMIN B-12) 1000 MCG tablet Take 100 mcg by mouth once daily.      FLUoxetine 40 MG capsule Take 1 capsule (40 mg total) by mouth once daily. 30 capsule 0    gabapentin (NEURONTIN) 100 MG capsule 1 capsule 3 TIMES DAILY (route: oral)      lisdexamfetamine (VYVANSE) 70 MG capsule Take 1 capsule (70 mg total) by mouth every morning. 30 capsule 0    losartan (COZAAR) 25 MG tablet Take 1 tablet (25 mg total) by mouth once daily. 90 tablet 6    methyl salicylate-menthol 15-10% 15-10 % Crea Apply topically every  evening. 113 g 0    pravastatin (PRAVACHOL) 20 MG tablet TAKE 1 TABLET EVERY DAY 90 tablet 3    propranoloL (INDERAL) 20 MG tablet Take 1 tablet (20 mg total) by mouth 2 (two) times daily. 180 tablet 3     No current facility-administered medications for this visit.       Past Medical History:   Diagnosis Date    Abnormal Pap smear     Atrial fibrillation     AV block, 1st degree 07/25/2012    C. difficile diarrhea     RESOLVED    Cataract     Depression 07/24/2012    Facet arthritis of lumbar region 03/31/2015    Falls     3 falls in the last 6 mos--noted 6/19/19    Hyperlipidemia     Hypertension 07/24/2012    Neuropathy     Other specified cardiac dysrhythmias(427.89)     Sleep apnea     Syncope 07/24/2012    Tremors of nervous system     hands bilaterally     Past Surgical History:   Procedure Laterality Date    APPLICATION OF CARTILAGE GRAFT Bilateral 12/19/2019    Procedure: APPLICATION, CARTILAGE GRAFT AURICULAR JEZ;  Surgeon: Martinez Flores III, MD;  Location: [facility];  Service: ENT;  Laterality: Bilateral;    CARDIAC PACEMAKER PLACEMENT  09/07/2012    [id number]    CATARACT EXTRACTION W/  INTRAOCULAR LENS IMPLANT Right 5/16/2022    Procedure: EXTRACTION, CATARACT, WITH IOL INSERTION;  Surgeon: Ines Aguilera MD;  Location: [facility];  Service: Ophthalmology;  Laterality: Right;  Catalys     CATARACT EXTRACTION W/  INTRAOCULAR LENS IMPLANT Left 6/20/2022    Procedure: EXTRACTION, CATARACT, WITH IOL INSERTION;  Surgeon: Ines Aguilera MD;  Location: [facility];  Service: Ophthalmology;  Laterality: Left;  Catalys     DILATION AND CURETTAGE OF UTERUS      Hx of precancerous cells?    ENDOSCOPIC ULTRASOUND OF UPPER GASTROINTESTINAL TRACT N/A 7/5/2019    Procedure: ULTRASOUND, UPPER GI TRACT, ENDOSCOPIC;  Surgeon: Kev Calderon MD;  Location: [facility] ENDO ([address] [place] [address]);  Service: Endoscopy;  Laterality: N/A;  Pacemaker-Adam   appt confirmed-rb    MYELOGRAPHY N/A 5/4/2021    Procedure: Myelogram   CERVICAL, THORACIC AND LUMBAR;  Surgeon: [facility] Provider;  Location: [address];  Service: Radiology;  Laterality: N/A;    NASAL SEPTOPLASTY N/A 2019    Procedure: SEPTOPLASTY, NOSE;  Surgeon: Martinez Flores III, MD;  Location: [facility];  Service: ENT;  Laterality: N/A;  FOLLOW DR SALVATORE KIMBROUGH PROTOCOL    OPEN REDUCTION AND INTERNAL FIXATION (ORIF) OF FRACTURE OF DISTAL RADIUS Left 2020    Procedure: ORIF, FRACTURE, RADIUS, DISTAL-left;  Surgeon: Ellen Moe MD;  Location: [facility] OR;  Service: Orthopedics;  Laterality: Left;    POSTERIOR FUSION OF CERVICAL SPINE WITH LAMINECTOMY N/A 2022    Procedure: LAMINECTOMY, SPINE, CERVICAL, WITH POSTERIOR FUSION C3-C6;  Surgeon: Nicolette Cheek MD;  Location: [address];  Service: Neurosurgery;  Laterality: N/A;  TORONTO III, ASA III, BLOOD TYPE AND SCREEN, NEUROMONITORING EMG SEP MEP, BRACE/HALO Nikolai, BED REGULAR BED, HEADREST [place], POSITION PRONE, SPECIAL EQUIPMENT NIKI MIDDLETON, RADIOLOGY C-ARM, EXT. BONE STIMULATOR BIOMET    REPLACEMENT OF PACEMAKER GENERATOR Left 10/7/2022    Procedure: REPLACEMENT, PACEMAKER GENERATOR;  Surgeon: John Sheets MD;  Location: [facility] EP [facility];  Service: Cardiology;  Laterality: Left;  YAS, SSS, AVB, Dual PPM Gen Change,SJM, MAC ,RI, 3 prep,** Adam dcPPM in situ**    SURGICAL REMOVAL OF NASAL TURBINATE Bilateral 2019    Procedure: EXCISION, NASAL TURBINATE;  Surgeon: Martinez Flores III, MD;  Location: [facility];  Service: ENT;  Laterality: Bilateral;    TONSILLECTOMY      WISDOM TOOTH EXTRACTION       Family History    None       Social History     Socioeconomic History    Marital status:    Occupational History    Occupation: /Rabbi     Employer: [employer]   Tobacco Use    Smoking status: Former     Packs/day: 0.25     Years: 15.00     Pack years: 3.75     Types: Cigarettes     Quit date: 1982     Years since quittin.8    Smokeless  tobacco: Former   Substance and Sexual Activity    Alcohol use: Yes     Comment: no daily or heavy use, ~4 times per month    Drug use: No    Sexual activity: Not Currently     Partners: Male     Social Determinants of Health     Financial Resource Strain: Low Risk     Difficulty of Paying Living Expenses: Not very hard   Food Insecurity: No Food Insecurity    Worried About Running Out of Food in the Last Year: Never true    Ran Out of Food in the Last Year: Never true   Transportation Needs: Unmet Transportation Needs    Lack of Transportation (Medical): Yes    Lack of Transportation (Non-Medical): No   Physical Activity: Insufficiently Active    Days of Exercise per Week: 1 day    Minutes of Exercise per Session: 60 min   Stress: Stress Concern Present    Feeling of Stress : To some extent   Social Connections: Unknown    Frequency of Communication with Friends and Family: More than three times a week    Frequency of Social Gatherings with Friends and Family: Once a week    Active Member of Clubs or Organizations: Yes    Attends Club or Organization Meetings: More than 4 times per year    Marital Status:    Housing Stability: Low Risk     Unable to Pay for Housing in the Last Year: No    Number of Places Lived in the Last Year: 1    Unstable Housing in the Last Year: No       Review of Systems   Constitutional: Negative for fever.        Weight loss   HENT: Negative for nosebleeds.    Eyes: Positive for visual disturbance.   Respiratory: Negative for shortness of breath.    Cardiovascular: Negative for chest pain.   Gastrointestinal: Negative for constipation.   Endocrine: Negative for cold intolerance.   Genitourinary: Negative for difficulty urinating.   Musculoskeletal: Positive for back pain and gait problem. Negative for neck pain.   Skin: Negative for color change.   Neurological: Negative for headaches.   Hematological: Does not bruise/bleed easily.   Psychiatric/Behavioral: Positive for dysphoric  mood. The patient is nervous/anxious.        OBJECTIVE:     Vital Signs  Temp: 98.1 °F (36.7 °C)  Pulse: 60  Resp: 16  BP: (!) 160/84  Pain Score: 0-No pain  There is no height or weight on file to calculate BMI.    Physical Exam:     Constitutional: She appears well-developed and well-nourished. No distress.      Eyes: EOM are normal.      Abdominal: Soft.      Skin: Skin displays no rash on extremities. Skin displays no lesions on extremities.     Psych/Behavior: She is alert. She is oriented to person, place, and time.     Musculoskeletal:      Comments: Guarded, myelopathic gait  Hand intrinsics 5/5 bilaterally   Biceps 5    Decreased arm swing on right     Neurological:        Coordination: She has normal finger to nose coordination.   + Maria's bilaterally   Tremulous with R>L resting tremor      Pulmonary: symmetric bilaterally     Diagnostic Results:  CT personally reviewed     ASSESSMENT/PLAN:     Manisha Wynne is a 72 y.o. female with cervical stenosis and myelopathy. She is now s/p C3-C6 laminectomy and fusion on 11/14/22.     She is doing well overall. She has been careful about not over-exerting herself. She has been compliant with use of the bone growth stimulator.     We discussed her CT findings; since she is completely asymptomatic, we will observe for now.     I would like her to continue to participate in PT. I have ordered an extension of her PT.     I would like to see her back in 5-6 months with Xrays cervical spine     I have encouraged her to contact the clinic in interim with any questions, concerns, or adverse clinical change.

## 2023-05-05 PROBLEM — Z98.1 S/P CERVICAL SPINAL FUSION: Status: ACTIVE | Noted: 2023-05-05

## 2023-05-08 ENCOUNTER — CLINICAL SUPPORT (OUTPATIENT)
Dept: REHABILITATION | Facility: HOSPITAL | Age: 73
End: 2023-05-08
Attending: NEUROLOGICAL SURGERY
Payer: MEDICARE

## 2023-05-08 ENCOUNTER — IMMUNIZATION (OUTPATIENT)
Dept: PHARMACY | Facility: CLINIC | Age: 73
End: 2023-05-08
Payer: MEDICARE

## 2023-05-08 DIAGNOSIS — G95.9 CERVICAL MYELOPATHY: ICD-10-CM

## 2023-05-08 DIAGNOSIS — Z23 NEED FOR VACCINATION: Primary | ICD-10-CM

## 2023-05-08 DIAGNOSIS — R26.81 GAIT INSTABILITY: Primary | ICD-10-CM

## 2023-05-08 DIAGNOSIS — Z74.09 IMPAIRED FUNCTIONAL MOBILITY, BALANCE, GAIT, AND ENDURANCE: ICD-10-CM

## 2023-05-08 PROCEDURE — 97112 NEUROMUSCULAR REEDUCATION: CPT | Mod: PO

## 2023-05-08 PROCEDURE — 97530 THERAPEUTIC ACTIVITIES: CPT | Mod: PO

## 2023-05-08 NOTE — PROGRESS NOTES
[facility]   Physical Therapy Treatment Note     Name: Manisha Wynne  Clinic Number: [id number]    Therapy Diagnosis:   Encounter Diagnoses   Name Primary?    Cervical myelopathy     Gait instability Yes    Impaired functional mobility, balance, gait, and endurance          Physician: Sunitha Rosario P*    Visit Date: 5/8/2023    Encounter Diagnoses   Name Primary?    Gait instability      Impaired functional mobility, balance, gait, and endurance Yes      Physician: Sunitha Rosario P*     Physician Orders: PT Eval and Treat Neuro Physical Therapy   Medical Diagnosis from Referral: R26.81 (ICD-10-CM) - Gait instability  Evaluation Date: 4/24/23 (2/13/2023)  Authorization Period Expiration: 6/6/23  Plan of Care Expiration: 6/24/23 (4/3/23)  Visit # / Visits authorized: 14/20    PTA Visit #: 1/5     Time In: 10:40  Time Out: 11:25  Total Billable Time:  45 minutes    SUBJECTIVE     Pt reports: I feel like my balance is getting better.  I was able to walk and talk to my friend the other day.   She was not compliant with home exercise program.  Response to previous treatment: I felt ok   Functional change: improved balance    Pain: 2/10  Location: neck pain     OBJECTIVE     Objective Measures updated at progress report unless specified.   FOTO: 57%    Treatment     Manisha received the treatments listed below:      therapeutic exercises to develop strength, endurance, ROM, flexibility, posture, and core stabilization for 5 minutes including:  Leg Press    3 X 10 both lower extremity with 100 lbs   X 10 single leg with 60 lb, each lower extremity     neuromuscular re-education activities to improve: Balance, Coordination, Kinesthetic, Sense, Proprioception, and Posture for 15 minutes. The following activities were included:    //Bars: with no upper extremity assist   20 feet x 2 tandem gait, with contact guard assist  20 feet x 2 walking lunge, 3 sec hold in position      30 sec split single leg stance on soft side of bosu, each side  30 sec split single leg stance on soft side of bosu, eyes closed each side    therapeutic activities to improve functional performance for 25 minutes, including:     Walking across red mat X 8 lengths, contact guard assist  Sidestepping across red mat x 4 lengths, stand by assist    20 feet x 6 Ambulation around cones, stepping on foam pad and over hurdles, stand by assist   20 feet x 4 Step tap onto cones, stepping on foam pad and over hurdles. Stand by assist         100 feet x 2 walking with head turns  100 feet x 2 walking with head nods       Patient Education and Home Exercises     Home Exercises Provided and Patient Education Provided     Education provided:   - home exercise program, attention to task, discussed fall prevention at home and strategies to get up from the floor if she does fall.     Written Home Exercises Provided: yes. Exercises were reviewed and Manisha was able to demonstrate them prior to the end of the session.  Manisha demonstrated good  understanding of the education provided. See EMR under Patient Instructions for exercises provided during therapy sessions    ASSESSMENT   Ms. Wynne tolerated session well. Progressed dynamic gait activity with obstacle course including cones, foam pad and hurdles, she was able to perform with contact guard assist with most difficulty stepping onto foam pad.  Also practiced ambulation on 6 foot soft foam mat, and she was able to perform without loss of balance.  She continues to make good progress toward her goals.     Manisha Is progressing well towards her goals.   Pt prognosis is Fair.     Pt will continue to benefit from skilled outpatient physical therapy to address the deficits listed in the problem list box on initial evaluation, provide pt/family education and to maximize pt's level of independence in the home and community environment.     Pt's spiritual, cultural and  educational needs considered and pt agreeable to plan of care and goals.     Anticipated barriers to physical therapy: co morbidities    Goals:   Short Term Goals: 6 weeks   Patient to perform HEP Independent  Progressing 4/24/23  Patient to score less than or equal to 45 sec on the 5 times sit to stand for improved transfers and functional strength Met 4/24/23  Patient to perform 20 min of aerobic activity atleast 3 times per week for improved endurance Progressing 4/24/23      Long Term Goals: 12 weeks   Patient to score greater than or equal 18/30 on the FGA for decreased fall risk Progressing 4/24/23  Patient to improved bilateral hip ext strength to 4/5 for improved core stability and improved standing on going   Patient to score greater than or equal to 68% on the ABC for improved balance confidence on going   Patient to score less than or equal to 11 sec on the TUG for improved gait and balance Progressing 4/24/23  PLAN     Continue Physical Therapy for 1 times per week for 8 weeks for therex, strength, neuro re education, balance, gait, endurance, therapeutic activity  Gait better for obstacle negotiation    Sandra Rivera, PT

## 2023-05-15 ENCOUNTER — CLINICAL SUPPORT (OUTPATIENT)
Dept: REHABILITATION | Facility: HOSPITAL | Age: 73
End: 2023-05-15
Attending: NEUROLOGICAL SURGERY
Payer: MEDICARE

## 2023-05-15 DIAGNOSIS — Z74.09 IMPAIRED FUNCTIONAL MOBILITY, BALANCE, GAIT, AND ENDURANCE: ICD-10-CM

## 2023-05-15 DIAGNOSIS — R26.81 GAIT INSTABILITY: Primary | ICD-10-CM

## 2023-05-15 PROCEDURE — 97112 NEUROMUSCULAR REEDUCATION: CPT | Mod: PO,CQ

## 2023-05-15 NOTE — PROGRESS NOTES
[facility]   Physical Therapy Treatment Note     Name: Manisha Wynne  Clinic Number: [id number]    Therapy Diagnosis:   Encounter Diagnoses   Name Primary?    Gait instability Yes    Impaired functional mobility, balance, gait, and endurance              Physician: Sunitha Rosario P*    Visit Date: 5/15/2023    Encounter Diagnoses   Name Primary?    Gait instability      Impaired functional mobility, balance, gait, and endurance Yes      Physician: Sunitha Rosario P*     Physician Orders: PT Eval and Treat Neuro Physical Therapy   Medical Diagnosis from Referral: R26.81 (ICD-10-CM) - Gait instability  Evaluation Date: 4/24/23 (2/13/2023)  Authorization Period Expiration: 6/6/23  Plan of Care Expiration: 6/24/23 (4/3/23)  Visit # / Visits authorized: 15/20  PTA Visit #: 1/5     Time In: 10:50 (arrived 20 minutes late for appointment)  Time Out: 11:15  Total Billable Time:  45 minutes    SUBJECTIVE     Pt reports: I feel like my balance is getting better.  I was able to walk and talk to my friend the other day.   She was not compliant with home exercise program.  Response to previous treatment: I felt ok   Functional change: improved balance    Pain: 2/10  Location: neck pain     OBJECTIVE     Objective Measures updated at progress report unless specified.   FOTO: 57%    Treatment     Manisha received the treatments listed below:      therapeutic exercises to develop strength, endurance, ROM, flexibility, posture, and core stabilization for 10 minutes including:  Leg Press    3 X 10 both lower extremity with 100 lbs   X 10 single leg with 60 lb, each lower extremity     neuromuscular re-education activities to improve: Balance, Coordination, Kinesthetic, Sense, Proprioception, and Posture for 10 minutes. The following activities were included:    //Bars: with no upper extremity assist   20 feet x 2 tandem gait, with contact guard assist  20 feet x 2 walking lunge, 3 sec  hold in position     30 sec split single leg stance on soft side of bosu, each side  30 sec split single leg stance on soft side of bosu, eyes closed each side    therapeutic activities to improve functional performance for 5 minutes, including:     Walking across red mat X 8 lengths, contact guard assist  Sidestepping across red mat x 4 lengths, stand by assist    20 feet x 6 Ambulation around cones, stepping on foam pad and over hurdles, stand by assist   20 feet x 4 Step tap onto cones, stepping on foam pad and over hurdles. Stand by assist         100 feet x 2 walking with head turns  100 feet x 2 walking with head nods       Patient Education and Home Exercises     Home Exercises Provided and Patient Education Provided     Education provided:   - home exercise program, attention to task, discussed fall prevention at home and strategies to get up from the floor if she does fall.     Written Home Exercises Provided: yes. Exercises were reviewed and Manisha was able to demonstrate them prior to the end of the session.  Manisha demonstrated good  understanding of the education provided. See EMR under Patient Instructions for exercises provided during therapy sessions    ASSESSMENT   Ms. Wynne tolerated session well. Continued dynamic gait activity with obstacle course including cones, foam pad and hurdles.   Also practiced ambulation on 6 foot soft foam mat. However she struggled with this more so today. We were limited in interventions due to late arrival and inability to accommodate. She continues to make good progress toward her goals.     Manisha Is progressing well towards her goals.   Pt prognosis is Fair.     Pt will continue to benefit from skilled outpatient physical therapy to address the deficits listed in the problem list box on initial evaluation, provide pt/family education and to maximize pt's level of independence in the home and community environment.     Pt's spiritual, cultural and  educational needs considered and pt agreeable to plan of care and goals.     Anticipated barriers to physical therapy: co morbidities    Goals:   Short Term Goals: 6 weeks   Patient to perform HEP Independent  Progressing 4/24/23  Patient to score less than or equal to 45 sec on the 5 times sit to stand for improved transfers and functional strength Met 4/24/23  Patient to perform 20 min of aerobic activity atleast 3 times per week for improved endurance Progressing 4/24/23      Long Term Goals: 12 weeks   Patient to score greater than or equal 18/30 on the FGA for decreased fall risk Progressing 4/24/23  Patient to improved bilateral hip ext strength to 4/5 for improved core stability and improved standing on going   Patient to score greater than or equal to 68% on the ABC for improved balance confidence on going   Patient to score less than or equal to 11 sec on the TUG for improved gait and balance Progressing 4/24/23  PLAN     Continue Physical Therapy for 1 times per week for 8 weeks for therex, strength, neuro re education, balance, gait, endurance, therapeutic activity  Gait better for obstacle negotiation    Uche Gipson, PTA

## 2023-05-17 ENCOUNTER — PATIENT MESSAGE (OUTPATIENT)
Dept: OTOLARYNGOLOGY | Facility: CLINIC | Age: 73
End: 2023-05-17
Payer: MEDICARE

## 2023-05-17 ENCOUNTER — OFFICE VISIT (OUTPATIENT)
Dept: PODIATRY | Facility: CLINIC | Age: 73
End: 2023-05-17
Payer: MEDICARE

## 2023-05-17 VITALS
WEIGHT: 156.75 LBS | DIASTOLIC BLOOD PRESSURE: 80 MMHG | BODY MASS INDEX: 23.76 KG/M2 | HEART RATE: 70 BPM | HEIGHT: 68 IN | SYSTOLIC BLOOD PRESSURE: 121 MMHG

## 2023-05-17 DIAGNOSIS — L97.511 SKIN ULCER OF SECOND TOE OF RIGHT FOOT, LIMITED TO BREAKDOWN OF SKIN: ICD-10-CM

## 2023-05-17 DIAGNOSIS — N18.30 STAGE 3 CHRONIC KIDNEY DISEASE, UNSPECIFIED WHETHER STAGE 3A OR 3B CKD: Primary | ICD-10-CM

## 2023-05-17 DIAGNOSIS — G60.8 SENSORY PERIPHERAL NEUROPATHY: ICD-10-CM

## 2023-05-17 PROCEDURE — 99999 PR PBB SHADOW E&M-EST. PATIENT-LVL III: CPT | Mod: PBBFAC,,, | Performed by: PODIATRIST

## 2023-05-17 PROCEDURE — 1125F PR PAIN SEVERITY QUANTIFIED, PAIN PRESENT: ICD-10-PCS | Mod: CPTII,,, | Performed by: PODIATRIST

## 2023-05-17 PROCEDURE — 1159F PR MEDICATION LIST DOCUMENTED IN MEDICAL RECORD: ICD-10-PCS | Mod: CPTII,,, | Performed by: PODIATRIST

## 2023-05-17 PROCEDURE — 3079F DIAST BP 80-89 MM HG: CPT | Mod: CPTII,,, | Performed by: PODIATRIST

## 2023-05-17 PROCEDURE — 11042 PR DEBRIDEMENT, SKIN, SUB-Q TISSUE,=<20 SQ CM: ICD-10-PCS | Mod: ,,, | Performed by: PODIATRIST

## 2023-05-17 PROCEDURE — 4010F PR ACE/ARB THEARPY RXD/TAKEN: ICD-10-PCS | Mod: CPTII,,, | Performed by: PODIATRIST

## 2023-05-17 PROCEDURE — 3079F PR MOST RECENT DIASTOLIC BLOOD PRESSURE 80-89 MM HG: ICD-10-PCS | Mod: CPTII,,, | Performed by: PODIATRIST

## 2023-05-17 PROCEDURE — 99213 PR OFFICE/OUTPT VISIT, EST, LEVL III, 20-29 MIN: ICD-10-PCS | Mod: 25,,, | Performed by: PODIATRIST

## 2023-05-17 PROCEDURE — 99499 RISK ADDL DX/OHS AUDIT: ICD-10-PCS | Mod: HCNC,S$GLB,, | Performed by: PODIATRIST

## 2023-05-17 PROCEDURE — 3008F BODY MASS INDEX DOCD: CPT | Mod: CPTII,,, | Performed by: PODIATRIST

## 2023-05-17 PROCEDURE — 1160F PR REVIEW ALL MEDS BY PRESCRIBER/CLIN PHARMACIST DOCUMENTED: ICD-10-PCS | Mod: CPTII,,, | Performed by: PODIATRIST

## 2023-05-17 PROCEDURE — 11042 DBRDMT SUBQ TIS 1ST 20SQCM/<: CPT | Mod: ,,, | Performed by: PODIATRIST

## 2023-05-17 PROCEDURE — 3008F PR BODY MASS INDEX (BMI) DOCUMENTED: ICD-10-PCS | Mod: CPTII,,, | Performed by: PODIATRIST

## 2023-05-17 PROCEDURE — 99999 PR PBB SHADOW E&M-EST. PATIENT-LVL III: ICD-10-PCS | Mod: PBBFAC,,, | Performed by: PODIATRIST

## 2023-05-17 PROCEDURE — 1125F AMNT PAIN NOTED PAIN PRSNT: CPT | Mod: CPTII,,, | Performed by: PODIATRIST

## 2023-05-17 PROCEDURE — 1157F PR ADVANCE CARE PLAN OR EQUIV PRESENT IN MEDICAL RECORD: ICD-10-PCS | Mod: CPTII,,, | Performed by: PODIATRIST

## 2023-05-17 PROCEDURE — 99499 UNLISTED E&M SERVICE: CPT | Mod: HCNC,S$GLB,, | Performed by: PODIATRIST

## 2023-05-17 PROCEDURE — 99213 OFFICE O/P EST LOW 20 MIN: CPT | Mod: 25,,, | Performed by: PODIATRIST

## 2023-05-17 PROCEDURE — 3074F PR MOST RECENT SYSTOLIC BLOOD PRESSURE < 130 MM HG: ICD-10-PCS | Mod: CPTII,,, | Performed by: PODIATRIST

## 2023-05-17 PROCEDURE — 1157F ADVNC CARE PLAN IN RCRD: CPT | Mod: CPTII,,, | Performed by: PODIATRIST

## 2023-05-17 PROCEDURE — 3074F SYST BP LT 130 MM HG: CPT | Mod: CPTII,,, | Performed by: PODIATRIST

## 2023-05-17 PROCEDURE — 1159F MED LIST DOCD IN RCRD: CPT | Mod: CPTII,,, | Performed by: PODIATRIST

## 2023-05-17 PROCEDURE — 4010F ACE/ARB THERAPY RXD/TAKEN: CPT | Mod: CPTII,,, | Performed by: PODIATRIST

## 2023-05-17 PROCEDURE — 1160F RVW MEDS BY RX/DR IN RCRD: CPT | Mod: CPTII,,, | Performed by: PODIATRIST

## 2023-05-17 NOTE — PROGRESS NOTES
Subjective:      Patient ID: Manisha Wynne is a 72 y.o. female.    Chief Complaint:   Nail Problem    Manisha is a 72 y.o. female who presents to the clinic for evaluation and treatment of ingrown toenail.  Manisha has a past medical history of Abnormal Pap smear, Atrial fibrillation, AV block, 1st degree (07/25/2012), C. difficile diarrhea, Cataract, Depression (07/24/2012), Facet arthritis of lumbar region (03/31/2015), Falls, Hyperlipidemia, Hypertension (07/24/2012), Neuropathy, Other specified cardiac dysrhythmias(427.89), Sleep apnea, Syncope (07/24/2012), and Tremors of nervous system. The patient's chief complaint nail care .  This patient has documented high risk feet requiring routine maintenance secondary to peripheral neuropathy.    Pt relates that she self cut her nails... and cut her skin last night.  It bled a lot. She wrapped it up.     PCP: Iris Blue MD    Date Last Seen by PCP: 3/3/23 heme onc    Current shoe gear:  Affected Foot: Casual shoes     Unaffected Foot: Casual shoes        Hemoglobin A1C   Date Value Ref Range Status   08/05/2022 5.3 4.0 - 5.6 % Final     Comment:     ADA Screening Guidelines:  5.7-6.4%  Consistent with prediabetes  >or=6.5%  Consistent with diabetes    High levels of fetal hemoglobin interfere with the HbA1C  assay. Heterozygous hemoglobin variants (HbS, HgC, etc)do  not significantly interfere with this assay.   However, presence of multiple variants may affect accuracy.     06/10/2021 5.4 4.0 - 5.6 % Final     Comment:     ADA Screening Guidelines:  5.7-6.4%  Consistent with prediabetes  >or=6.5%  Consistent with diabetes    High levels of fetal hemoglobin interfere with the HbA1C  assay. Heterozygous hemoglobin variants (HbS, HgC, etc)do  not significantly interfere with this assay.   However, presence of multiple variants may affect accuracy.     06/10/2021 5.4 4.0 - 5.6 % Final     Comment:     ADA Screening Guidelines:  5.7-6.4%  Consistent  with prediabetes  >or=6.5%  Consistent with diabetes    High levels of fetal hemoglobin interfere with the HbA1C  assay. Heterozygous hemoglobin variants (HbS, HgC, etc)do  not significantly interfere with this assay.   However, presence of multiple variants may affect accuracy.          Past Medical History:   Diagnosis Date    Abnormal Pap smear     Atrial fibrillation     AV block, 1st degree 07/25/2012    C. difficile diarrhea     RESOLVED    Cataract     Depression 07/24/2012    Facet arthritis of lumbar region 03/31/2015    Falls     3 falls in the last 6 mos--noted 6/19/19    Hyperlipidemia     Hypertension 07/24/2012    Neuropathy     Other specified cardiac dysrhythmias(427.89)     Sleep apnea     Syncope 07/24/2012    Tremors of nervous system     hands bilaterally     Past Surgical History:   Procedure Laterality Date    APPLICATION OF CARTILAGE GRAFT Bilateral 12/19/2019    Procedure: APPLICATION, CARTILAGE GRAFT AURICULAR JEZ;  Surgeon: Martinez Flores III, MD;  Location: [facility];  Service: ENT;  Laterality: Bilateral;    CARDIAC PACEMAKER PLACEMENT  09/07/2012    [id number]    CATARACT EXTRACTION W/  INTRAOCULAR LENS IMPLANT Right 5/16/2022    Procedure: EXTRACTION, CATARACT, WITH IOL INSERTION;  Surgeon: Ines Aguilera MD;  Location: [facility];  Service: Ophthalmology;  Laterality: Right;  Catalys     CATARACT EXTRACTION W/  INTRAOCULAR LENS IMPLANT Left 6/20/2022    Procedure: EXTRACTION, CATARACT, WITH IOL INSERTION;  Surgeon: Ines Aguilera MD;  Location: [facility];  Service: Ophthalmology;  Laterality: Left;  Catalys     DILATION AND CURETTAGE OF UTERUS      Hx of precancerous cells?    ENDOSCOPIC ULTRASOUND OF UPPER GASTROINTESTINAL TRACT N/A 7/5/2019    Procedure: ULTRASOUND, UPPER GI TRACT, ENDOSCOPIC;  Surgeon: Kev Calderon MD;  Location: [facility] ([address] [place] [address]);  Service: Endoscopy;  Laterality: N/A;  Pacemaker-Adam   appt confirmed-rb    MYELOGRAPHY N/A 5/4/2021    Procedure:  Myelogram  CERVICAL, THORACIC AND LUMBAR;  Surgeon: Leandro Diagnostic Provider;  Location: [address];  Service: Radiology;  Laterality: N/A;    NASAL SEPTOPLASTY N/A 12/19/2019    Procedure: SEPTOPLASTY, NOSE;  Surgeon: Martinez Floers III, MD;  Location: [facility];  Service: ENT;  Laterality: N/A;  FOLLOW DR SALVATORE KIMBROUGH PROTOCOL    OPEN REDUCTION AND INTERNAL FIXATION (ORIF) OF FRACTURE OF DISTAL RADIUS Left 1/24/2020    Procedure: ORIF, FRACTURE, RADIUS, DISTAL-left;  Surgeon: Ellen Moe MD;  Location: [facility] OR;  Service: Orthopedics;  Laterality: Left;    POSTERIOR FUSION OF CERVICAL SPINE WITH LAMINECTOMY N/A 11/14/2022    Procedure: LAMINECTOMY, SPINE, CERVICAL, WITH POSTERIOR FUSION C3-C6;  Surgeon: Nicolette Cheek MD;  Location: [address];  Service: Neurosurgery;  Laterality: N/A;  TORONTO III, ASA III, BLOOD TYPE AND SCREEN, NEUROMONITORING EMG SEP MEP, BRACE/HALO [place], BED REGULAR BED, HEADREST [place], POSITION PRONE, SPECIAL EQUIPMENT NIKI [place], RADIOLOGY C-ARM, EXT. BONE STIMULATOR BIOMET    REPLACEMENT OF PACEMAKER GENERATOR Left 10/7/2022    Procedure: REPLACEMENT, PACEMAKER GENERATOR;  Surgeon: John Sheets MD;  Location: [facility] EP LAB;  Service: Cardiology;  Laterality: Left;  YAS, SSS, AVB, Dual PPM Gen Change,SJM, MAC ,AR, 3 prep,** Adam dcPPM in situ**    SURGICAL REMOVAL OF NASAL TURBINATE Bilateral 12/19/2019    Procedure: EXCISION, NASAL TURBINATE;  Surgeon: Martinez Flores III, MD;  Location: [facility];  Service: ENT;  Laterality: Bilateral;    TONSILLECTOMY      WISDOM TOOTH EXTRACTION       Current Outpatient Medications on File Prior to Visit   Medication Sig Dispense Refill    acetaminophen (TYLENOL) 500 MG tablet Take 2 tablets (1,000 mg total) by mouth every 8 (eight) hours.  0    cyanocobalamin (VITAMIN B-12) 1000 MCG tablet Take 100 mcg by mouth once daily.      gabapentin (NEURONTIN) 100 MG capsule 1 capsule 3 TIMES DAILY (route: oral)       lisdexamfetamine (VYVANSE) 70 MG capsule Take 1 capsule (70 mg total) by mouth every morning. 30 capsule 0    losartan (COZAAR) 25 MG tablet Take 1 tablet (25 mg total) by mouth once daily. 90 tablet 6    methyl salicylate-menthol 15-10% 15-10 % Crea Apply topically every evening. 113 g 0    pravastatin (PRAVACHOL) 20 MG tablet TAKE 1 TABLET EVERY DAY 90 tablet 3    propranoloL (INDERAL) 20 MG tablet Take 1 tablet (20 mg total) by mouth 2 (two) times daily. 180 tablet 3    buPROPion (WELLBUTRIN XL) 300 MG 24 hr tablet Take 1 tablet (300 mg total) by mouth once daily. 30 tablet 0    calcium-vitamin D tablet 600 mg-200 units Take 1 tablet by mouth once daily. 30 tablet 0    FLUoxetine 40 MG capsule Take 1 capsule (40 mg total) by mouth once daily. 30 capsule 0     No current facility-administered medications on file prior to visit.     Review of patient's allergies indicates:  No Known Allergies    Review of Systems   Constitutional: Negative for chills, decreased appetite, fever, malaise/fatigue, night sweats, weight gain and weight loss.   Eyes:         Poor eyesight   Cardiovascular:  Negative for chest pain, claudication, dyspnea on exertion, leg swelling, palpitations and syncope.   Respiratory:  Negative for cough and shortness of breath.    Endocrine: Negative for cold intolerance and heat intolerance.   Hematologic/Lymphatic: Negative for bleeding problem. Does not bruise/bleed easily.   Skin:  Positive for nail changes. Negative for color change, dry skin, flushing, itching, poor wound healing, rash, skin cancer, suspicious lesions and unusual hair distribution.   Musculoskeletal:  Positive for arthritis and stiffness. Negative for back pain, falls, gout, joint pain, joint swelling, muscle cramps, muscle weakness, myalgias and neck pain.   Gastrointestinal:  Negative for diarrhea, nausea and vomiting.   Neurological:  Positive for numbness and paresthesias. Negative for dizziness, focal weakness,  "light-headedness, tremors, vertigo and weakness.   Psychiatric/Behavioral:  Negative for altered mental status and depression. The patient does not have insomnia.    Allergic/Immunologic: Negative.          Objective:       Vitals:    05/17/23 1017   BP: 121/80   Pulse: 70   Weight: 71.1 kg (156 lb 12 oz)   Height: 5' 8" (1.727 m)   PainSc:   9   PainLoc: Toe   71.1 kg (156 lb 12 oz) afeb    Physical Exam  Vitals reviewed.   Constitutional:       General: She is not in acute distress.     Appearance: She is well-developed. She is not ill-appearing, toxic-appearing or diaphoretic.      Comments: Proper supportive shoegear   Cardiovascular:      Pulses:           Dorsalis pedis pulses are 2+ on the right side and 2+ on the left side.        Posterior tibial pulses are 1+ on the right side and 1+ on the left side.      Comments: No edema to digits  Musculoskeletal:         General: Deformity present.      Right lower leg: No edema.      Left lower leg: No edema.      Right foot: Decreased range of motion. Deformity, prominent metatarsal heads and tenderness present. No bony tenderness.      Left foot: Decreased range of motion. Deformity and prominent metatarsal heads present. No tenderness or bony tenderness.      Comments:    Semi- Reducible extensor and flexor contractures at the MTPJ and PIPJ of toes 2-5, bilat.     + pop to 2nd and 3rd digits right   Feet:      Right foot:      Protective Sensation: 10 sites tested.  0 sites sensed.      Skin integrity: Dry skin present. No ulcer, blister, skin breakdown, erythema, warmth or callus.      Toenail Condition: Right toenails are abnormally thick and long. Fungal disease present.     Left foot:      Protective Sensation: 10 sites tested.  0 sites sensed.      Skin integrity: Dry skin present. No ulcer, blister, skin breakdown, erythema, warmth or callus.      Toenail Condition: Left toenails are abnormally thick and long. Fungal disease present.     Comments:  Toenails " 1-5 bilaterally are elongated by 2-3 mm, thickened by 2-3 mm, discolored/yellowed, dystrophic, brittle with subungual debris.    + hemosiderin /preulcer to distal 3rd digit right     + ulceration to 2nd digit  Ulcer location:  right, 2nd toe   Pre debridement Measurements: 0.7cm x 1.0cm x unknown cm   Post debridement Measurements : 0.8 cm x 1.0cm x 0.1cm  Signs of infection: No  Erythema: No  Undermining: No  Tunneling: Yes  Drainage: Bloody  Periwound skin: intact  Wound Base: granular       No IS macerations      Skin:     General: Skin is warm and dry.      Capillary Refill: Capillary refill takes 2 to 3 seconds.      Coloration: Skin is not pale.      Findings: No erythema or rash.      Nails: There is clubbing.      Comments:          Neurological:      Mental Status: She is alert and oriented to person, place, and time.      Sensory: Sensory deficit present.      Motor: Atrophy present.      Gait: Gait abnormal.   Psychiatric:         Attention and Perception: Attention normal.         Mood and Affect: Mood normal.         Speech: Speech normal.         Thought Content: Thought content normal.         Cognition and Memory: Cognition normal.         Judgment: Judgment normal.                  Assessment:       Encounter Diagnoses   Name Primary?    Stage 3 chronic kidney disease, unspecified whether stage 3a or 3b CKD Yes    Sensory peripheral neuropathy     Skin ulcer of second toe of right foot, limited to breakdown of skin            Plan:       Manisha was seen today for nail problem.    Diagnoses and all orders for this visit:    Stage 3 chronic kidney disease, unspecified whether stage 3a or 3b CKD    Sensory peripheral neuropathy    Skin ulcer of second toe of right foot, limited to breakdown of skin        I counseled the patient on her conditions, their implications and medical management.    - continue hammertoe crest pads; monitor skin for any breakdown    - Shoe inspection. Patient instructed on  proper foot hygeine. We discussed wearing proper shoe gear, daily foot inspections, never walking without protective shoe gear, never putting sharp instruments to feet, routine podiatric nail visits every 2-3 months.      - With patient's permission, nails were aggressively reduced and debrided x 10 to their soft tissue attachment mechanically  removing all offending nail and debris. Patient relates relief following the procedure.       Advised patient against self cutting her nails as this has caused a wound.  Discussed with patient wound care  No indication for x-ray or cultures or oral antibiotics    Debridement: With verbal consent, nonviable tissues on the right foot were debrided to subq utilizing a  sterile No. 3 scalpel and forceps. Minimal bleeding controlled with direct pressure  The patient tolerated this well.     Dressings: Wound gel  Offloading:Foam    Follow-up:Patient is to return to the clinic in 1 week  for follow-up but should call [facility] immediately if any signs of infection, such as fever, chills, sweats, increased redness or pain.    Short-term goals include maintaining good offloading and minimizing bioburden, promoting granulation and epithelialization to healing.  Long-term goals include keeping the wound healed by good offloading and medical management under the direction of internist.     F/u 1 week but pt relates she may not can come back for a few.

## 2023-05-25 ENCOUNTER — OFFICE VISIT (OUTPATIENT)
Dept: OTOLARYNGOLOGY | Facility: CLINIC | Age: 73
End: 2023-05-25
Payer: MEDICARE

## 2023-05-25 DIAGNOSIS — H61.23 IMPACTED CERUMEN, BILATERAL: Primary | ICD-10-CM

## 2023-05-25 PROCEDURE — 99499 NO LOS: ICD-10-PCS | Mod: S$GLB,,, | Performed by: NURSE PRACTITIONER

## 2023-05-25 PROCEDURE — 99999 PR PBB SHADOW E&M-EST. PATIENT-LVL I: ICD-10-PCS | Mod: PBBFAC,,, | Performed by: NURSE PRACTITIONER

## 2023-05-25 PROCEDURE — 99499 UNLISTED E&M SERVICE: CPT | Mod: S$GLB,,, | Performed by: NURSE PRACTITIONER

## 2023-05-25 PROCEDURE — 69210 REMOVE IMPACTED EAR WAX UNI: CPT | Mod: S$GLB,,, | Performed by: NURSE PRACTITIONER

## 2023-05-25 PROCEDURE — 99999 PR PBB SHADOW E&M-EST. PATIENT-LVL I: CPT | Mod: PBBFAC,,, | Performed by: NURSE PRACTITIONER

## 2023-05-25 PROCEDURE — 69210 EAR CERUMEN REMOVAL: ICD-10-PCS | Mod: S$GLB,,, | Performed by: NURSE PRACTITIONER

## 2023-05-25 NOTE — PROCEDURES
Ear Cerumen Removal    Date/Time: 5/25/2023 3:30 PM  Performed by: Domenica Stapleton NP  Authorized by: Domenica Stapleton NP       Local anesthetic:  None  Location details:  Both ears  Procedure type: curette    Procedure type comment:  Suction  Cerumen  Removal Results:  Cerumen completely removed  Patient tolerance:  Patient tolerated the procedure well with no immediate complications     Procedure Note:    The patient was brought to the minor procedure room and placed under the operating microscope of the right ear canal which was cleaned of ceruminous debris. Using a combination of suction, curettes and cup forceps the patient's cerumen impaction was removed. The patient tolerated the procedure well. There were no complications.    Procedure Note:    The patient was brought to the minor procedure room and placed under the operating microscope of the left ear canal which was cleaned of ceruminous debris. Using a combination of suction, curettes and cup forceps the patient's cerumen impaction was removed.  The patient tolerated the procedure well. There were no complications.      Regular cleaning- RTC in 1 year for cleaning with audio

## 2023-05-30 NOTE — TELEPHONE ENCOUNTER
Attempted to reach pt there was no answer, left message for pt to call clinic and report bs results via phone or send MCM.    Await pt response.   Pt stated that she will call when she needs a refill.

## 2023-05-31 ENCOUNTER — OFFICE VISIT (OUTPATIENT)
Dept: PODIATRY | Facility: CLINIC | Age: 73
End: 2023-05-31
Payer: MEDICARE

## 2023-05-31 VITALS
HEART RATE: 60 BPM | BODY MASS INDEX: 23.86 KG/M2 | DIASTOLIC BLOOD PRESSURE: 73 MMHG | SYSTOLIC BLOOD PRESSURE: 138 MMHG | WEIGHT: 157.44 LBS | HEIGHT: 68 IN

## 2023-05-31 DIAGNOSIS — G60.8 SENSORY PERIPHERAL NEUROPATHY: ICD-10-CM

## 2023-05-31 DIAGNOSIS — L97.511 SKIN ULCER OF SECOND TOE OF RIGHT FOOT, LIMITED TO BREAKDOWN OF SKIN: ICD-10-CM

## 2023-05-31 DIAGNOSIS — M20.41 HAMMER TOES OF BOTH FEET: ICD-10-CM

## 2023-05-31 DIAGNOSIS — N18.30 STAGE 3 CHRONIC KIDNEY DISEASE, UNSPECIFIED WHETHER STAGE 3A OR 3B CKD: Primary | ICD-10-CM

## 2023-05-31 DIAGNOSIS — M20.42 HAMMER TOES OF BOTH FEET: ICD-10-CM

## 2023-05-31 PROCEDURE — 1157F ADVNC CARE PLAN IN RCRD: CPT | Mod: CPTII,S$GLB,, | Performed by: PODIATRIST

## 2023-05-31 PROCEDURE — 3075F PR MOST RECENT SYSTOLIC BLOOD PRESS GE 130-139MM HG: ICD-10-PCS | Mod: CPTII,S$GLB,, | Performed by: PODIATRIST

## 2023-05-31 PROCEDURE — 99999 PR PBB SHADOW E&M-EST. PATIENT-LVL III: ICD-10-PCS | Mod: PBBFAC,,, | Performed by: PODIATRIST

## 2023-05-31 PROCEDURE — 1157F PR ADVANCE CARE PLAN OR EQUIV PRESENT IN MEDICAL RECORD: ICD-10-PCS | Mod: CPTII,S$GLB,, | Performed by: PODIATRIST

## 2023-05-31 PROCEDURE — 1160F RVW MEDS BY RX/DR IN RCRD: CPT | Mod: CPTII,S$GLB,, | Performed by: PODIATRIST

## 2023-05-31 PROCEDURE — 1159F PR MEDICATION LIST DOCUMENTED IN MEDICAL RECORD: ICD-10-PCS | Mod: CPTII,S$GLB,, | Performed by: PODIATRIST

## 2023-05-31 PROCEDURE — 3078F PR MOST RECENT DIASTOLIC BLOOD PRESSURE < 80 MM HG: ICD-10-PCS | Mod: CPTII,S$GLB,, | Performed by: PODIATRIST

## 2023-05-31 PROCEDURE — 1159F MED LIST DOCD IN RCRD: CPT | Mod: CPTII,S$GLB,, | Performed by: PODIATRIST

## 2023-05-31 PROCEDURE — 3075F SYST BP GE 130 - 139MM HG: CPT | Mod: CPTII,S$GLB,, | Performed by: PODIATRIST

## 2023-05-31 PROCEDURE — 3008F BODY MASS INDEX DOCD: CPT | Mod: CPTII,S$GLB,, | Performed by: PODIATRIST

## 2023-05-31 PROCEDURE — 4010F PR ACE/ARB THEARPY RXD/TAKEN: ICD-10-PCS | Mod: CPTII,S$GLB,, | Performed by: PODIATRIST

## 2023-05-31 PROCEDURE — 99213 PR OFFICE/OUTPT VISIT, EST, LEVL III, 20-29 MIN: ICD-10-PCS | Mod: S$GLB,,, | Performed by: PODIATRIST

## 2023-05-31 PROCEDURE — 99213 OFFICE O/P EST LOW 20 MIN: CPT | Mod: S$GLB,,, | Performed by: PODIATRIST

## 2023-05-31 PROCEDURE — 1125F AMNT PAIN NOTED PAIN PRSNT: CPT | Mod: CPTII,S$GLB,, | Performed by: PODIATRIST

## 2023-05-31 PROCEDURE — 99999 PR PBB SHADOW E&M-EST. PATIENT-LVL III: CPT | Mod: PBBFAC,,, | Performed by: PODIATRIST

## 2023-05-31 PROCEDURE — 1160F PR REVIEW ALL MEDS BY PRESCRIBER/CLIN PHARMACIST DOCUMENTED: ICD-10-PCS | Mod: CPTII,S$GLB,, | Performed by: PODIATRIST

## 2023-05-31 PROCEDURE — 1125F PR PAIN SEVERITY QUANTIFIED, PAIN PRESENT: ICD-10-PCS | Mod: CPTII,S$GLB,, | Performed by: PODIATRIST

## 2023-05-31 PROCEDURE — 3008F PR BODY MASS INDEX (BMI) DOCUMENTED: ICD-10-PCS | Mod: CPTII,S$GLB,, | Performed by: PODIATRIST

## 2023-05-31 PROCEDURE — 3078F DIAST BP <80 MM HG: CPT | Mod: CPTII,S$GLB,, | Performed by: PODIATRIST

## 2023-05-31 PROCEDURE — 4010F ACE/ARB THERAPY RXD/TAKEN: CPT | Mod: CPTII,S$GLB,, | Performed by: PODIATRIST

## 2023-05-31 NOTE — PROGRESS NOTES
Subjective:      Patient ID: Manisha Wynne is a 72 y.o. female.    Chief Complaint:   Follow-up (Right foot 2nd toe ulcer f/u)    Manisha is a 72 y.o. female who presents to the clinic for evaluation and treatment of ingrown toenail.  Manisha has a past medical history of Abnormal Pap smear, Atrial fibrillation, AV block, 1st degree (07/25/2012), C. difficile diarrhea, Cataract, Depression (07/24/2012), Facet arthritis of lumbar region (03/31/2015), Falls, Hyperlipidemia, Hypertension (07/24/2012), Neuropathy, Other specified cardiac dysrhythmias(427.89), Sleep apnea, Syncope (07/24/2012), and Tremors of nervous system.  .  This patient has documented high risk feet requiring routine maintenance secondary to peripheral neuropathy.    Pt here to f/u abrasion left foot.  Patient relates she is doing okay except that she is a new complaint of on her right foot.  She relates she woke up the last few mornings with pain under the toes.  She soaked and took Tylenol    Patient is going to [place] soon for a trip will be doing a lot a walking  She was using the felt crest pads but recently purchase some gel ones online she is brought them today for evaluation      PCP: Iris Blue MD    Date Last Seen by PCP: 3/3/23 heme onc    Current shoe gear:  Affected Foot: Tennis shoes     Unaffected Foot: Tennis shoes        Hemoglobin A1C   Date Value Ref Range Status   08/05/2022 5.3 4.0 - 5.6 % Final     Comment:     ADA Screening Guidelines:  5.7-6.4%  Consistent with prediabetes  >or=6.5%  Consistent with diabetes    High levels of fetal hemoglobin interfere with the HbA1C  assay. Heterozygous hemoglobin variants (HbS, HgC, etc)do  not significantly interfere with this assay.   However, presence of multiple variants may affect accuracy.     06/10/2021 5.4 4.0 - 5.6 % Final     Comment:     ADA Screening Guidelines:  5.7-6.4%  Consistent with prediabetes  >or=6.5%  Consistent with diabetes    High levels of fetal  hemoglobin interfere with the HbA1C  assay. Heterozygous hemoglobin variants (HbS, HgC, etc)do  not significantly interfere with this assay.   However, presence of multiple variants may affect accuracy.     06/10/2021 5.4 4.0 - 5.6 % Final     Comment:     ADA Screening Guidelines:  5.7-6.4%  Consistent with prediabetes  >or=6.5%  Consistent with diabetes    High levels of fetal hemoglobin interfere with the HbA1C  assay. Heterozygous hemoglobin variants (HbS, HgC, etc)do  not significantly interfere with this assay.   However, presence of multiple variants may affect accuracy.          Past Medical History:   Diagnosis Date    Abnormal Pap smear     Atrial fibrillation     AV block, 1st degree 07/25/2012    C. difficile diarrhea     RESOLVED    Cataract     Depression 07/24/2012    Facet arthritis of lumbar region 03/31/2015    Falls     3 falls in the last 6 mos--noted 6/19/19    Hyperlipidemia     Hypertension 07/24/2012    Neuropathy     Other specified cardiac dysrhythmias(427.89)     Sleep apnea     Syncope 07/24/2012    Tremors of nervous system     hands bilaterally     Past Surgical History:   Procedure Laterality Date    APPLICATION OF CARTILAGE GRAFT Bilateral 12/19/2019    Procedure: APPLICATION, CARTILAGE GRAFT AURICULAR JEZ;  Surgeon: Martinez Flores III, MD;  Location: [facility] OR;  Service: ENT;  Laterality: Bilateral;    CARDIAC PACEMAKER PLACEMENT  09/07/2012    [id number]    CATARACT EXTRACTION W/  INTRAOCULAR LENS IMPLANT Right 5/16/2022    Procedure: EXTRACTION, CATARACT, WITH IOL INSERTION;  Surgeon: Ines Aguilera MD;  Location: [facility] OR;  Service: Ophthalmology;  Laterality: Right;  Catalys     CATARACT EXTRACTION W/  INTRAOCULAR LENS IMPLANT Left 6/20/2022    Procedure: EXTRACTION, CATARACT, WITH IOL INSERTION;  Surgeon: Ines Aguilera MD;  Location: [facility] OR;  Service: Ophthalmology;  Laterality: Left;  Catalys     DILATION AND CURETTAGE OF UTERUS      Hx of precancerous  cells?    ENDOSCOPIC ULTRASOUND OF UPPER GASTROINTESTINAL TRACT N/A 7/5/2019    Procedure: ULTRASOUND, UPPER GI TRACT, ENDOSCOPIC;  Surgeon: Kev Calderon MD;  Location: [facility] ENDO (2ND FLR);  Service: Endoscopy;  Laterality: N/A;  Pacemaker-Adam   appt confirmed-rb    MYELOGRAPHY N/A 5/4/2021    Procedure: Myelogram  CERVICAL, THORACIC AND LUMBAR;  Surgeon: [facility] Diagnostic Provider;  Location: [facility] OR 2ND FLR;  Service: Radiology;  Laterality: N/A;    NASAL SEPTOPLASTY N/A 12/19/2019    Procedure: SEPTOPLASTY, NOSE;  Surgeon: Martinez Flores III, MD;  Location: [facility];  Service: ENT;  Laterality: N/A;  FOLLOW DR SALVATORE KIMBROUGH PROTOCOL    OPEN REDUCTION AND INTERNAL FIXATION (ORIF) OF FRACTURE OF DISTAL RADIUS Left 1/24/2020    Procedure: ORIF, FRACTURE, RADIUS, DISTAL-left;  Surgeon: Ellen Moe MD;  Location: [facility] OR;  Service: Orthopedics;  Laterality: Left;    POSTERIOR FUSION OF CERVICAL SPINE WITH LAMINECTOMY N/A 11/14/2022    Procedure: LAMINECTOMY, SPINE, CERVICAL, WITH POSTERIOR FUSION C3-C6;  Surgeon: Nicolette Cheek MD;  Location: [facility] 2ND FLR;  Service: Neurosurgery;  Laterality: N/A;  TORONTO III, ASA III, BLOOD TYPE AND SCREEN, NEUROMONITORING EMG SEP MEP, BRACE/HALO [place], BED REGULAR BED, HEADREST [place], POSITION PRONE, SPECIAL EQUIPMENT NIKI [place]LE[place], RADIOLOGY C-ARM, EXT. BONE STIMULATOR BIOMET    REPLACEMENT OF PACEMAKER GENERATOR Left 10/7/2022    Procedure: REPLACEMENT, PACEMAKER GENERATOR;  Surgeon: John Sheets MD;  Location: [facility] EP LAB;  Service: Cardiology;  Laterality: Left;  YAS, SSS, AVB, Dual PPM Gen Change,SJM, MAC ,OH, 3 prep,** Adam dcPPM in situ**    SURGICAL REMOVAL OF NASAL TURBINATE Bilateral 12/19/2019    Procedure: EXCISION, NASAL TURBINATE;  Surgeon: Martinez Flores III, MD;  Location: [facility];  Service: ENT;  Laterality: Bilateral;    TONSILLECTOMY      WISDOM TOOTH EXTRACTION       Current Outpatient Medications on File Prior to Visit   Medication  Sig Dispense Refill    acetaminophen (TYLENOL) 500 MG tablet Take 2 tablets (1,000 mg total) by mouth every 8 (eight) hours.  0    cyanocobalamin (VITAMIN B-12) 1000 MCG tablet Take 100 mcg by mouth once daily.      gabapentin (NEURONTIN) 100 MG capsule 1 capsule 3 TIMES DAILY (route: oral)      lisdexamfetamine (VYVANSE) 70 MG capsule Take 1 capsule (70 mg total) by mouth every morning. 30 capsule 0    losartan (COZAAR) 25 MG tablet Take 1 tablet (25 mg total) by mouth once daily. 90 tablet 6    methyl salicylate-menthol 15-10% 15-10 % Crea Apply topically every evening. 113 g 0    pravastatin (PRAVACHOL) 20 MG tablet TAKE 1 TABLET EVERY DAY 90 tablet 3    propranoloL (INDERAL) 20 MG tablet Take 1 tablet (20 mg total) by mouth 2 (two) times daily. 180 tablet 3    buPROPion (WELLBUTRIN XL) 300 MG 24 hr tablet Take 1 tablet (300 mg total) by mouth once daily. 30 tablet 0    calcium-vitamin D tablet 600 mg-200 units Take 1 tablet by mouth once daily. 30 tablet 0    FLUoxetine 40 MG capsule Take 1 capsule (40 mg total) by mouth once daily. 30 capsule 0     No current facility-administered medications on file prior to visit.     Review of patient's allergies indicates:  No Known Allergies    Review of Systems   Constitutional: Negative for chills, decreased appetite, fever, malaise/fatigue, night sweats, weight gain and weight loss.   Eyes:         Poor eyesight   Cardiovascular:  Negative for chest pain, claudication, dyspnea on exertion, leg swelling, palpitations and syncope.   Respiratory:  Negative for cough and shortness of breath.    Endocrine: Negative for cold intolerance and heat intolerance.   Hematologic/Lymphatic: Negative for bleeding problem. Does not bruise/bleed easily.   Skin:  Positive for nail changes. Negative for color change, dry skin, flushing, itching, poor wound healing, rash, skin cancer, suspicious lesions and unusual hair distribution.   Musculoskeletal:  Positive for arthritis and  "stiffness. Negative for back pain, falls, gout, joint pain, joint swelling, muscle cramps, muscle weakness, myalgias and neck pain.   Gastrointestinal:  Negative for diarrhea, nausea and vomiting.   Neurological:  Positive for numbness and paresthesias. Negative for dizziness, focal weakness, light-headedness, tremors, vertigo and weakness.   Psychiatric/Behavioral:  Negative for altered mental status and depression. The patient does not have insomnia.    Allergic/Immunologic: Negative.          Objective:       Vitals:    05/31/23 1010   BP: 138/73   Pulse: 60   Weight: 71.4 kg (157 lb 6.5 oz)   Height: 5' 8" (1.727 m)   PainSc:   8   PainLoc: Toe   71.4 kg (157 lb 6.5 oz) afeb    Physical Exam  Vitals reviewed.   Constitutional:       General: She is not in acute distress.     Appearance: She is well-developed. She is not ill-appearing, toxic-appearing or diaphoretic.      Comments: Proper supportive shoegear   Cardiovascular:      Pulses:           Dorsalis pedis pulses are 2+ on the right side and 2+ on the left side.        Posterior tibial pulses are 1+ on the right side and 1+ on the left side.      Comments: No edema to digits  Musculoskeletal:         General: Deformity present.      Right lower leg: No edema.      Left lower leg: No edema.      Right foot: Decreased range of motion. Deformity, prominent metatarsal heads and tenderness present. No bony tenderness.      Left foot: Decreased range of motion. Deformity and prominent metatarsal heads present. No tenderness or bony tenderness.      Comments:    Semi- Reducible extensor and flexor contractures at the MTPJ and PIPJ of toes 2-5, bilat.     + pop to 2nd and 3rd digits sulcus right   Feet:      Right foot:      Protective Sensation: 10 sites tested.  0 sites sensed.      Skin integrity: Dry skin present. No ulcer, blister, skin breakdown, erythema, warmth or callus.      Toenail Condition: Right toenails are abnormally thick. Fungal disease " present.     Left foot:      Protective Sensation: 10 sites tested.  0 sites sensed.      Skin integrity: Dry skin present. No ulcer, blister, skin breakdown, erythema, warmth or callus.      Toenail Condition: Left toenails are abnormally thick. Fungal disease present.     Comments:  Toenails 1-5 bilaterally are  discolored/yellowed, dystrophic, brittle with subungual debris.    -  hemosiderin /preulcer to distal 3rd digit right     + ulceration to 2nd digit= resolved         Positive right peeling to IS/ macerations  No acute signs of infection    Skin:     General: Skin is warm and dry.      Capillary Refill: Capillary refill takes 2 to 3 seconds.      Coloration: Skin is not pale.      Findings: No erythema or rash.      Nails: There is clubbing.      Comments:          Neurological:      Mental Status: She is alert and oriented to person, place, and time.      Sensory: Sensory deficit present.      Motor: Atrophy present.      Gait: Gait abnormal.   Psychiatric:         Attention and Perception: Attention normal.         Mood and Affect: Mood normal.         Speech: Speech normal.         Thought Content: Thought content normal.         Cognition and Memory: Cognition normal.         Judgment: Judgment normal.                  Assessment:       Encounter Diagnoses   Name Primary?    Stage 3 chronic kidney disease, unspecified whether stage 3a or 3b CKD Yes    Skin ulcer of second toe of right foot, limited to breakdown of skin     Sensory peripheral neuropathy     Hammer toes of both feet              Plan:       Manisha was seen today for follow-up.    Diagnoses and all orders for this visit:    Stage 3 chronic kidney disease, unspecified whether stage 3a or 3b CKD    Skin ulcer of second toe of right foot, limited to breakdown of skin    Sensory peripheral neuropathy    Hammer toes of both feet          I counseled the patient on her conditions, their implications and medical management.    - discussed  waiting on the continue hammertoe crest pads specifically gel type and use the felt if needed;  skin  breakdown is likely due to the padding.  Dispensed lamb's wool and discussed use  Continue proper shoe gear    - Shoe inspection. Patient instructed on proper foot hygeine. We discussed wearing proper shoe gear, daily foot inspections, never walking without protective shoe gear, never putting sharp instruments to feet, routine podiatric nail visits every 2-3 months.       Ulcer resolved monitor feet daily    Return in a few weeks after [place] trip

## 2023-06-02 ENCOUNTER — CLINICAL SUPPORT (OUTPATIENT)
Dept: REHABILITATION | Facility: HOSPITAL | Age: 73
End: 2023-06-02
Attending: NEUROLOGICAL SURGERY
Payer: MEDICARE

## 2023-06-02 DIAGNOSIS — Z74.09 IMPAIRED FUNCTIONAL MOBILITY, BALANCE, GAIT, AND ENDURANCE: ICD-10-CM

## 2023-06-02 DIAGNOSIS — R26.81 GAIT INSTABILITY: Primary | ICD-10-CM

## 2023-06-02 PROCEDURE — 97112 NEUROMUSCULAR REEDUCATION: CPT | Mod: PO

## 2023-06-02 PROCEDURE — 97110 THERAPEUTIC EXERCISES: CPT | Mod: PO

## 2023-06-02 NOTE — PROGRESS NOTES
[facility]   Physical Therapy Treatment and Discharge Note     Name: Manisha Wynne  Clinic Number: [id number]    Therapy Diagnosis:   Encounter Diagnoses   Name Primary?    Gait instability Yes    Impaired functional mobility, balance, gait, and endurance        Physician: Sunitha Rosario P*    Visit Date: 2023    Encounter Diagnoses   Name Primary?    Gait instability      Impaired functional mobility, balance, gait, and endurance Yes         Physician Orders: PT Eval and Treat Neuro Physical Therapy   Medical Diagnosis from Referral: R26.81 (ICD-10-CM) - Gait instability  Evaluation Date: 23 (2023)  Authorization Period Expiration: 23  Plan of Care Expiration: 23 (4/3/23)  Visit # / Visits authorized:   PTA Visit #: 0/5     Time In: 11:45  Time Out: 12:30  Total Billable Time:  45 minutes    SUBJECTIVE     Pt reports: Patient notes that she will be going out of town for awhile.  Reports she has been trying to walk more.  Her phone is telling her to increase her walking goal.  She has been doing a floor transfer using her ottoman.   She was not compliant with home exercise program.  Response to previous treatment: I felt ok   Functional change: improved balance    Pain: 2/10  Location: neck pain     OBJECTIVE     Objective Measures updated at progress report unless specified.     FOTO: 56%  ABC 72%    Treatment     Manisha received the treatments listed below:      therapeutic exercises to develop strength, endurance, ROM, flexibility, posture, and core stabilization for 20 minutes including:  TU sec   Functional Gait Assessment:   1. Gait on level surface =  2   (3) Normal: less than 5.5 sec, no A.D., no imbalance, normal gait pattern, deviates< 6in   (2) Mild impairment: 7-5.6 sec, uses A.D., mild gait deviations, or deviates 6-10 in   (1) Moderate impairment: > 7 sec, slow speed, imbalance, deviates 10-15 in.   (0) Severe impairment:  needs assist, deviates >15 in, reach/touch wall  2. Change in Gait Speed = 3   (3) Normal: smooth change w/o loss of balance or gait deviation, deviates < 6 in, significant difference between speeds   (2) Mild impairment: changes speed, but demonstrates mild gait deviations, deviates 6-10 in, OR no deviations but unable to significantly speed, OR uses A.D.   (1) Moderate impairment: minor changes to speed, OR changes speed w/ significant deviations, deviates 10-15 in, OR  Changes speed , but loses balance & recovers   (0) Severe impairment: cannot change speed, deviates >15 in, or loses balance & needs assist  3. Gait with horizontal head turns  = 3   (3) Normal: no change in gait, deviates <6 in   (2) Mild impairment: slight change in speed, deviates 6-10 in, OR uses A.D.   (1) Moderate impairment: moderate change in speed, deviates 10-15 in   (0) Severe impairment: severe disruption of gait, deviates >15in  4. Gait with vertical head turns = 3   (3) Normal: no change in gait, deviates <6 in   (2) Mild impairment: slight change in speed, deviates 6-10 in OR uses A.D.   (1) Moderate impairment: moderate change in speed, deviates 10-15 in   (0) Severe impairment: severe disruption of gait, deviates >15 in  5. Gait with pivot turns = 2   (3) Normal: performs safely in 3 sec, no LOB   (2) Mild impairment: performs in >3 sec & no LOB, OR turns safely & requires several steps to regain LOB   (1) Moderate impairment: turns slow, OR requires several small steps for balance following turn & stop   (0) Severe impairment: cannot turn safely, needs assist  6. Step over obstacle = 2   (3) Normal: steps over 2 stacked boxes w/o change in speed or LOB   (2) Mild impairment: able to step over 1 box w/o change in speed or LOB   (1) Moderate impairment: steps over 1 box but must slow down, may require VC   (0) Severe impairment: cannot perform w/o assist  7. Gait with Narrow MANOHAR = 1   (3) Normal: 10 steps no staggering   (2) Mild  impairment: 7-9 steps   (1) Moderate impairment: 4-7 steps   (0) Severe impairment: < 4 steps or cannot perform w/o assist  8. Gait with eyes closed = 1   (3) Normal: < 7 sec, no A.D., no LOB, normal gait pattern, deviates <6 in   (2) Mild impairment: 7.1-9 sec, mild gait deviations, deviates 6-10 in   (1) Moderate impairment: > 9 sec, abnormal pattern, LOB, deviates 10-15 in   (0) Severe impairment: cannot perform w/o assist, LOB, deviates >15in  9. Ambulating Backwards = 2   (3) Normal: no A.D., no LOB, normal gait pattern, deviates <6in   (2) Mild impairment: uses A.D., slower speed, mild gait deviations, deviates 6-10 in   (1) Moderate impairment: slow speed, abnormal gait pattern, LOB, deviates 10-15 in   (0) Severe impairment: severe gait deviations or LOB, deviates >15in  10. Steps = 2   (3) Normal: alternating feet, no rail   (2) Mild Impairment: alternating feet, uses rail   (1) Moderate impairment: step-to, uses rail   (0) Severe impairment: cannot perform safely    Score 22/30     Score:   <22/30 fall risk   <20/30 fall risk in older adults   <18/30 fall risk in Parkinsons      Supine:  Bridge x 10   Bridge with abd x 10     neuromuscular re-education activities to improve: Balance, Coordination, Kinesthetic, Sense, Proprioception, and Posture for 25 minutes. The following activities were included:  X 10 Quadruped rocking  X 10 Quadruped alternating lower extremity ext     X 10 Tall kneeling hip flex to hip ext     X 10 sit to stand from lower black mat     //Bars: with no upper extremity assist   2 X 30 sec feet together eyes closed  2 X 30 sec tandem stance, each foot in front    therapeutic activities to improve functional performance for 5 minutes, including:         Patient Education and Home Exercises     Home Exercises Provided and Patient Education Provided     Education provided:   - discharge plan, HEP     Written Home Exercises Provided: yes. Exercises were reviewed and Manisha was able to  demonstrate them prior to the end of the session.  Manisha demonstrated good  understanding of the education provided. See EMR under Patient Instructions for exercises provided during therapy sessions    ASSESSMENT   Ms. Wynne has made good progress in Physical Therapy and has met most of her goals.  She scored 72% on the Activities Balance Confidence scale indicating improved confidence in walking activities.  She scored 22/30 on the FGA indicating improved balance, but near fall risk category.  She demo's improved transfers and no longer requires upper extremity assist for sit to stand. We reviewed HEP and she is independent with performance as well as reporting increased aerobic activity during the week.  She has no other Physical Therapy needs at this time, will discharge Physical Therapy.     Goals:   Short Term Goals: 6 weeks   Patient to perform HEP Independent  MET 6/2/23  Patient to score less than or equal to 45 sec on the 5 times sit to stand for improved transfers and functional strength Met 4/24/23  Patient to perform 20 min of aerobic activity atleast 3 times per week for improved endurance MET 6/2/23     Long Term Goals: 12 weeks   Patient to score greater than or equal 18/30 on the FGA for decreased fall risk MET 6/2/23  Patient to improved bilateral hip ext strength to 4/5 for improved core stability and improved standing MET 6/2/23  Patient to score greater than or equal to 68% on the ABC for improved balance confidence MET 6/2/23  Patient to score less than or equal to 11 sec on the TUG for improved gait and balance Progressing 12 seconds   PLAN     Discharge Physical Therapy     Sandra Rivera, PT

## 2023-07-03 ENCOUNTER — LAB VISIT (OUTPATIENT)
Dept: LAB | Facility: HOSPITAL | Age: 73
End: 2023-07-03
Attending: STUDENT IN AN ORGANIZED HEALTH CARE EDUCATION/TRAINING PROGRAM
Payer: MEDICARE

## 2023-07-03 DIAGNOSIS — N18.30 CKD (CHRONIC KIDNEY DISEASE) STAGE 3, GFR 30-59 ML/MIN: ICD-10-CM

## 2023-07-03 DIAGNOSIS — D69.6 THROMBOCYTOPENIA: ICD-10-CM

## 2023-07-03 DIAGNOSIS — I48.91 ATRIAL FIBRILLATION: ICD-10-CM

## 2023-07-03 DIAGNOSIS — D64.9 NORMOCYTIC ANEMIA: ICD-10-CM

## 2023-07-03 LAB
ALBUMIN SERPL BCP-MCNC: 4 G/DL (ref 3.5–5.2)
ALP SERPL-CCNC: 56 U/L (ref 55–135)
ALT SERPL W/O P-5'-P-CCNC: 21 U/L (ref 10–44)
ANION GAP SERPL CALC-SCNC: 10 MMOL/L (ref 8–16)
AST SERPL-CCNC: 19 U/L (ref 10–40)
BASOPHILS # BLD AUTO: 0.04 K/UL (ref 0–0.2)
BASOPHILS NFR BLD: 0.5 % (ref 0–1.9)
BILIRUB SERPL-MCNC: 0.7 MG/DL (ref 0.1–1)
BUN SERPL-MCNC: 25 MG/DL (ref 8–23)
CALCIUM SERPL-MCNC: 9.8 MG/DL (ref 8.7–10.5)
CHLORIDE SERPL-SCNC: 107 MMOL/L (ref 95–110)
CO2 SERPL-SCNC: 24 MMOL/L (ref 23–29)
CREAT SERPL-MCNC: 1.1 MG/DL (ref 0.5–1.4)
DIFFERENTIAL METHOD: ABNORMAL
EOSINOPHIL # BLD AUTO: 0.3 K/UL (ref 0–0.5)
EOSINOPHIL NFR BLD: 3.3 % (ref 0–8)
ERYTHROCYTE [DISTWIDTH] IN BLOOD BY AUTOMATED COUNT: 13.2 % (ref 11.5–14.5)
EST. GFR  (NO RACE VARIABLE): 52.7 ML/MIN/1.73 M^2
GLUCOSE SERPL-MCNC: 97 MG/DL (ref 70–110)
HCT VFR BLD AUTO: 36 % (ref 37–48.5)
HGB BLD-MCNC: 11.9 G/DL (ref 12–16)
IMM GRANULOCYTES # BLD AUTO: 0.02 K/UL (ref 0–0.04)
IMM GRANULOCYTES NFR BLD AUTO: 0.3 % (ref 0–0.5)
LYMPHOCYTES # BLD AUTO: 2.2 K/UL (ref 1–4.8)
LYMPHOCYTES NFR BLD: 28.5 % (ref 18–48)
MCH RBC QN AUTO: 29.7 PG (ref 27–31)
MCHC RBC AUTO-ENTMCNC: 33.1 G/DL (ref 32–36)
MCV RBC AUTO: 90 FL (ref 82–98)
MONOCYTES # BLD AUTO: 1 K/UL (ref 0.3–1)
MONOCYTES NFR BLD: 13.1 % (ref 4–15)
NEUTROPHILS # BLD AUTO: 4.2 K/UL (ref 1.8–7.7)
NEUTROPHILS NFR BLD: 54.3 % (ref 38–73)
NRBC BLD-RTO: 0 /100 WBC
PLATELET # BLD AUTO: 172 K/UL (ref 150–450)
PMV BLD AUTO: 10.7 FL (ref 9.2–12.9)
POTASSIUM SERPL-SCNC: 4.3 MMOL/L (ref 3.5–5.1)
PROT SERPL-MCNC: 7.5 G/DL (ref 6–8.4)
RBC # BLD AUTO: 4.01 M/UL (ref 4–5.4)
SODIUM SERPL-SCNC: 141 MMOL/L (ref 136–145)
WBC # BLD AUTO: 7.64 K/UL (ref 3.9–12.7)

## 2023-07-03 PROCEDURE — 81450 HL NEO GSAP 5-50DNA/DNA&RNA: CPT | Performed by: STUDENT IN AN ORGANIZED HEALTH CARE EDUCATION/TRAINING PROGRAM

## 2023-07-03 PROCEDURE — 80053 COMPREHEN METABOLIC PANEL: CPT | Performed by: STUDENT IN AN ORGANIZED HEALTH CARE EDUCATION/TRAINING PROGRAM

## 2023-07-03 PROCEDURE — 85025 COMPLETE CBC W/AUTO DIFF WBC: CPT | Performed by: STUDENT IN AN ORGANIZED HEALTH CARE EDUCATION/TRAINING PROGRAM

## 2023-07-03 PROCEDURE — 36415 COLL VENOUS BLD VENIPUNCTURE: CPT | Performed by: STUDENT IN AN ORGANIZED HEALTH CARE EDUCATION/TRAINING PROGRAM

## 2023-07-04 ENCOUNTER — CLINICAL SUPPORT (OUTPATIENT)
Dept: CARDIOLOGY | Facility: HOSPITAL | Age: 73
End: 2023-07-04
Payer: MEDICARE

## 2023-07-04 DIAGNOSIS — Z95.0 PRESENCE OF CARDIAC PACEMAKER: ICD-10-CM

## 2023-07-04 PROCEDURE — 93294 REM INTERROG EVL PM/LDLS PM: CPT | Mod: ,,, | Performed by: INTERNAL MEDICINE

## 2023-07-04 PROCEDURE — 93296 REM INTERROG EVL PM/IDS: CPT | Performed by: INTERNAL MEDICINE

## 2023-07-04 PROCEDURE — 93294 CARDIAC DEVICE CHECK - REMOTE: ICD-10-PCS | Mod: ,,, | Performed by: INTERNAL MEDICINE

## 2023-07-05 ENCOUNTER — OFFICE VISIT (OUTPATIENT)
Dept: PODIATRY | Facility: CLINIC | Age: 73
End: 2023-07-05
Payer: MEDICARE

## 2023-07-05 VITALS
WEIGHT: 157 LBS | SYSTOLIC BLOOD PRESSURE: 129 MMHG | BODY MASS INDEX: 23.79 KG/M2 | HEART RATE: 63 BPM | HEIGHT: 68 IN | DIASTOLIC BLOOD PRESSURE: 76 MMHG | RESPIRATION RATE: 19 BRPM

## 2023-07-05 DIAGNOSIS — M20.42 HAMMER TOES OF BOTH FEET: ICD-10-CM

## 2023-07-05 DIAGNOSIS — M20.41 HAMMER TOES OF BOTH FEET: ICD-10-CM

## 2023-07-05 DIAGNOSIS — N18.30 STAGE 3 CHRONIC KIDNEY DISEASE, UNSPECIFIED WHETHER STAGE 3A OR 3B CKD: Primary | ICD-10-CM

## 2023-07-05 DIAGNOSIS — L84 PRE-ULCERATIVE CALLUSES: ICD-10-CM

## 2023-07-05 DIAGNOSIS — G60.8 SENSORY PERIPHERAL NEUROPATHY: ICD-10-CM

## 2023-07-05 PROCEDURE — 1126F PR PAIN SEVERITY QUANTIFIED, NO PAIN PRESENT: ICD-10-PCS | Mod: CPTII,S$GLB,, | Performed by: PODIATRIST

## 2023-07-05 PROCEDURE — 3008F BODY MASS INDEX DOCD: CPT | Mod: CPTII,S$GLB,, | Performed by: PODIATRIST

## 2023-07-05 PROCEDURE — 4010F PR ACE/ARB THEARPY RXD/TAKEN: ICD-10-PCS | Mod: CPTII,S$GLB,, | Performed by: PODIATRIST

## 2023-07-05 PROCEDURE — 4010F ACE/ARB THERAPY RXD/TAKEN: CPT | Mod: CPTII,S$GLB,, | Performed by: PODIATRIST

## 2023-07-05 PROCEDURE — 99999 PR PBB SHADOW E&M-EST. PATIENT-LVL III: ICD-10-PCS | Mod: PBBFAC,,, | Performed by: PODIATRIST

## 2023-07-05 PROCEDURE — 1160F PR REVIEW ALL MEDS BY PRESCRIBER/CLIN PHARMACIST DOCUMENTED: ICD-10-PCS | Mod: CPTII,S$GLB,, | Performed by: PODIATRIST

## 2023-07-05 PROCEDURE — 99999 PR PBB SHADOW E&M-EST. PATIENT-LVL III: CPT | Mod: PBBFAC,,, | Performed by: PODIATRIST

## 2023-07-05 PROCEDURE — 3078F PR MOST RECENT DIASTOLIC BLOOD PRESSURE < 80 MM HG: ICD-10-PCS | Mod: CPTII,S$GLB,, | Performed by: PODIATRIST

## 2023-07-05 PROCEDURE — 1126F AMNT PAIN NOTED NONE PRSNT: CPT | Mod: CPTII,S$GLB,, | Performed by: PODIATRIST

## 2023-07-05 PROCEDURE — 3074F PR MOST RECENT SYSTOLIC BLOOD PRESSURE < 130 MM HG: ICD-10-PCS | Mod: CPTII,S$GLB,, | Performed by: PODIATRIST

## 2023-07-05 PROCEDURE — 3078F DIAST BP <80 MM HG: CPT | Mod: CPTII,S$GLB,, | Performed by: PODIATRIST

## 2023-07-05 PROCEDURE — 99213 PR OFFICE/OUTPT VISIT, EST, LEVL III, 20-29 MIN: ICD-10-PCS | Mod: S$GLB,,, | Performed by: PODIATRIST

## 2023-07-05 PROCEDURE — 1159F MED LIST DOCD IN RCRD: CPT | Mod: CPTII,S$GLB,, | Performed by: PODIATRIST

## 2023-07-05 PROCEDURE — 3008F PR BODY MASS INDEX (BMI) DOCUMENTED: ICD-10-PCS | Mod: CPTII,S$GLB,, | Performed by: PODIATRIST

## 2023-07-05 PROCEDURE — 1159F PR MEDICATION LIST DOCUMENTED IN MEDICAL RECORD: ICD-10-PCS | Mod: CPTII,S$GLB,, | Performed by: PODIATRIST

## 2023-07-05 PROCEDURE — 1157F ADVNC CARE PLAN IN RCRD: CPT | Mod: CPTII,S$GLB,, | Performed by: PODIATRIST

## 2023-07-05 PROCEDURE — 1160F RVW MEDS BY RX/DR IN RCRD: CPT | Mod: CPTII,S$GLB,, | Performed by: PODIATRIST

## 2023-07-05 PROCEDURE — 3074F SYST BP LT 130 MM HG: CPT | Mod: CPTII,S$GLB,, | Performed by: PODIATRIST

## 2023-07-05 PROCEDURE — 99213 OFFICE O/P EST LOW 20 MIN: CPT | Mod: S$GLB,,, | Performed by: PODIATRIST

## 2023-07-05 PROCEDURE — 1157F PR ADVANCE CARE PLAN OR EQUIV PRESENT IN MEDICAL RECORD: ICD-10-PCS | Mod: CPTII,S$GLB,, | Performed by: PODIATRIST

## 2023-07-05 NOTE — PROGRESS NOTES
Subjective:      Patient ID: Manisha Wynne is a 72 y.o. female.    Chief Complaint:   Foot Ulcer (Blisters in between toes ) and Routine Foot Care (Nail care and callous )    Manisha is a 72 y.o. female who presents to the clinic for evaluation and treatment of feet  Manisha has a past medical history of Abnormal Pap smear, Atrial fibrillation, AV block, 1st degree (07/25/2012), C. difficile diarrhea, Cataract, Depression (07/24/2012), Facet arthritis of lumbar region (03/31/2015), Falls, Hyperlipidemia, Hypertension (07/24/2012), Neuropathy, Other specified cardiac dysrhythmias(427.89), Sleep apnea, Syncope (07/24/2012), and Tremors of nervous system.  .  This patient has documented high risk feet requiring routine maintenance secondary to peripheral neuropathy.    Patient relates she is better now.  When she was in [place] [employer] her feet got really bad and she bought blister type Band-Aids and utilize them with the lamb's wool.  Overall things are improving she is been applying the Vaseline daily   She is not Interested in any surgery at this time     PCP: Iris Blue MD    Date Last Seen by PCP: 3/3/23 heme onc    Current shoe gear:  Affected Foot : clogs Crocs        Hemoglobin A1C   Date Value Ref Range Status   08/05/2022 5.3 4.0 - 5.6 % Final     Comment:     ADA Screening Guidelines:  5.7-6.4%  Consistent with prediabetes  >or=6.5%  Consistent with diabetes    High levels of fetal hemoglobin interfere with the HbA1C  assay. Heterozygous hemoglobin variants (HbS, HgC, etc)do  not significantly interfere with this assay.   However, presence of multiple variants may affect accuracy.     06/10/2021 5.4 4.0 - 5.6 % Final     Comment:     ADA Screening Guidelines:  5.7-6.4%  Consistent with prediabetes  >or=6.5%  Consistent with diabetes    High levels of fetal hemoglobin interfere with the HbA1C  assay. Heterozygous hemoglobin variants (HbS, HgC, etc)do  not significantly interfere with this assay.    However, presence of multiple variants may affect accuracy.     06/10/2021 5.4 4.0 - 5.6 % Final     Comment:     ADA Screening Guidelines:  5.7-6.4%  Consistent with prediabetes  >or=6.5%  Consistent with diabetes    High levels of fetal hemoglobin interfere with the HbA1C  assay. Heterozygous hemoglobin variants (HbS, HgC, etc)do  not significantly interfere with this assay.   However, presence of multiple variants may affect accuracy.          Past Medical History:   Diagnosis Date    Abnormal Pap smear     Atrial fibrillation     AV block, 1st degree 07/25/2012    C. difficile diarrhea     RESOLVED    Cataract     Depression 07/24/2012    Facet arthritis of lumbar region 03/31/2015    Falls     3 falls in the last 6 mos--noted 6/19/19    Hyperlipidemia     Hypertension 07/24/2012    Neuropathy     Other specified cardiac dysrhythmias(427.89)     Sleep apnea     Syncope 07/24/2012    Tremors of nervous system     hands bilaterally     Past Surgical History:   Procedure Laterality Date    APPLICATION OF CARTILAGE GRAFT Bilateral 12/19/2019    Procedure: APPLICATION, CARTILAGE GRAFT AURICULAR JEZ;  Surgeon: Martinez Flores III, MD;  Location: [facility] OR;  Service: ENT;  Laterality: Bilateral;    CARDIAC PACEMAKER PLACEMENT  09/07/2012    [id number]    CATARACT EXTRACTION W/  INTRAOCULAR LENS IMPLANT Right 5/16/2022    Procedure: EXTRACTION, CATARACT, WITH IOL INSERTION;  Surgeon: Ines Aguilera MD;  Location: [facility] OR;  Service: Ophthalmology;  Laterality: Right;  Catalys     CATARACT EXTRACTION W/  INTRAOCULAR LENS IMPLANT Left 6/20/2022    Procedure: EXTRACTION, CATARACT, WITH IOL INSERTION;  Surgeon: Ines Aguilera MD;  Location: [facility] OR;  Service: Ophthalmology;  Laterality: Left;  Catalys     DILATION AND CURETTAGE OF UTERUS      Hx of precancerous cells?    ENDOSCOPIC ULTRASOUND OF UPPER GASTROINTESTINAL TRACT N/A 7/5/2019    Procedure: ULTRASOUND, UPPER GI TRACT, ENDOSCOPIC;  Surgeon: Kev  SHERIE Calderon MD;  Location: [facility] ENDO (2ND FLR);  Service: Endoscopy;  Laterality: N/A;  Pacemaker-Adam   appt confirmed-rb    MYELOGRAPHY N/A 5/4/2021    Procedure: Myelogram  CERVICAL, THORACIC AND LUMBAR;  Surgeon: Leandro Diagnostic Provider;  Location: [facility] 2ND FLR;  Service: Radiology;  Laterality: N/A;    NASAL SEPTOPLASTY N/A 12/19/2019    Procedure: SEPTOPLASTY, NOSE;  Surgeon: Martinez Flores III, MD;  Location: [facility];  Service: ENT;  Laterality: N/A;  FOLLOW DR SALVATORE KIMBROUGH PROTOCOL    OPEN REDUCTION AND INTERNAL FIXATION (ORIF) OF FRACTURE OF DISTAL RADIUS Left 1/24/2020    Procedure: ORIF, FRACTURE, RADIUS, DISTAL-left;  Surgeon: Ellen Moe MD;  Location: [facility] OR;  Service: Orthopedics;  Laterality: Left;    POSTERIOR FUSION OF CERVICAL SPINE WITH LAMINECTOMY N/A 11/14/2022    Procedure: LAMINECTOMY, SPINE, CERVICAL, WITH POSTERIOR FUSION C3-C6;  Surgeon: Nicolette Cheek MD;  Location: [facility] 2ND FLR;  Service: Neurosurgery;  Laterality: N/A;  TORONTO III, ASA III, BLOOD TYPE AND SCREEN, NEUROMONITORING EMG SEP MEP, BRACE/HALO [place], BED REGULAR BED, HEADREST [place], POSITION PRONE, SPECIAL EQUIPMENT NIKI MIDDLETON, RADIOLOGY C-ARM, EXT. BONE STIMULATOR BIOMET    REPLACEMENT OF PACEMAKER GENERATOR Left 10/7/2022    Procedure: REPLACEMENT, PACEMAKER GENERATOR;  Surgeon: John Sheets MD;  Location: [facility] EP LAB;  Service: Cardiology;  Laterality: Left;  YAS, SSS, AVB, Dual PPM Gen Change,SJM, MAC ,MD, 3 prep,** Adam dcPPM in situ**    SURGICAL REMOVAL OF NASAL TURBINATE Bilateral 12/19/2019    Procedure: EXCISION, NASAL TURBINATE;  Surgeon: Martinez Flores III, MD;  Location: [facility];  Service: ENT;  Laterality: Bilateral;    TONSILLECTOMY      WISDOM TOOTH EXTRACTION       Current Outpatient Medications on File Prior to Visit   Medication Sig Dispense Refill    acetaminophen (TYLENOL) 500 MG tablet Take 2 tablets (1,000 mg total) by mouth every 8 (eight) hours.  0    cyanocobalamin  (VITAMIN B-12) 1000 MCG tablet Take 100 mcg by mouth once daily.      gabapentin (NEURONTIN) 100 MG capsule 1 capsule 3 TIMES DAILY (route: oral)      lisdexamfetamine (VYVANSE) 70 MG capsule Take 1 capsule (70 mg total) by mouth every morning. 30 capsule 0    losartan (COZAAR) 25 MG tablet Take 1 tablet (25 mg total) by mouth once daily. 90 tablet 6    methyl salicylate-menthol 15-10% 15-10 % Crea Apply topically every evening. 113 g 0    pravastatin (PRAVACHOL) 20 MG tablet TAKE 1 TABLET EVERY DAY 90 tablet 3    propranoloL (INDERAL) 20 MG tablet Take 1 tablet (20 mg total) by mouth 2 (two) times daily. 180 tablet 3    buPROPion (WELLBUTRIN XL) 300 MG 24 hr tablet Take 1 tablet (300 mg total) by mouth once daily. 30 tablet 0    calcium-vitamin D tablet 600 mg-200 units Take 1 tablet by mouth once daily. 30 tablet 0    FLUoxetine 40 MG capsule Take 1 capsule (40 mg total) by mouth once daily. 30 capsule 0     No current facility-administered medications on file prior to visit.     Review of patient's allergies indicates:  No Known Allergies    Review of Systems   Constitutional: Negative for chills, decreased appetite, fever, malaise/fatigue, night sweats, weight gain and weight loss.   Eyes:         Poor eyesight   Cardiovascular:  Negative for chest pain, claudication, dyspnea on exertion, leg swelling, palpitations and syncope.   Respiratory:  Negative for cough and shortness of breath.    Endocrine: Negative for cold intolerance and heat intolerance.   Hematologic/Lymphatic: Negative for bleeding problem. Does not bruise/bleed easily.   Skin:  Positive for nail changes. Negative for color change, dry skin, flushing, itching, poor wound healing, rash, skin cancer, suspicious lesions and unusual hair distribution.   Musculoskeletal:  Positive for arthritis and stiffness. Negative for back pain, falls, gout, joint pain, joint swelling, muscle cramps, muscle weakness, myalgias and neck pain.   Gastrointestinal:   "Negative for diarrhea, nausea and vomiting.   Neurological:  Positive for numbness and paresthesias. Negative for dizziness, focal weakness, light-headedness, tremors, vertigo and weakness.   Psychiatric/Behavioral:  Negative for altered mental status and depression. The patient does not have insomnia.    Allergic/Immunologic: Negative.          Objective:       Vitals:    07/05/23 1040   BP: 129/76   Pulse: 63   Resp: 19   Weight: 71.2 kg (157 lb)   Height: 5' 8" (1.727 m)   PainSc: 0-No pain   71.2 kg (157 lb) afeb    Physical Exam  Vitals reviewed.   Constitutional:       General: She is not in acute distress.     Appearance: She is well-developed. She is not ill-appearing, toxic-appearing or diaphoretic.      Comments: Proper supportive shoegear   Cardiovascular:      Pulses:           Dorsalis pedis pulses are 2+ on the right side and 2+ on the left side.        Posterior tibial pulses are 1+ on the right side and 1+ on the left side.      Comments: No edema to digits  Musculoskeletal:         General: Deformity present.      Right lower leg: No edema.      Left lower leg: No edema.      Right foot: Decreased range of motion. Deformity, prominent metatarsal heads and tenderness present. No bony tenderness.      Left foot: Decreased range of motion. Deformity and prominent metatarsal heads present. No tenderness or bony tenderness.      Comments:    Semi- Reducible extensor and flexor contractures at the MTPJ and PIPJ of toes 2-5, bilat.     - pop to 2nd and 3rd digits sulcus right    Positive pain with debridement of 3rd digit pre ulcerative callus right   Feet:      Right foot:      Protective Sensation: 10 sites tested.  0 sites sensed.      Skin integrity: Dry skin present. No ulcer, blister, skin breakdown, erythema, warmth or callus.      Toenail Condition: Right toenails are abnormally thick. Fungal disease present.     Left foot:      Protective Sensation: 10 sites tested.  0 sites sensed.      Skin " integrity: Dry skin present. No ulcer, blister, skin breakdown, erythema, warmth or callus.      Toenail Condition: Left toenails are abnormally thick. Fungal disease present.     Comments:  Toenails 1-5 bilaterally are  discolored/yellowed, dystrophic, brittle with subungual debris.    Positive hemosiderin /preulcer to distal 3rd digit right   No other areas of breakdown interspaces appear clear      No acute signs of infection    Skin:     General: Skin is warm and dry.      Capillary Refill: Capillary refill takes 2 to 3 seconds.      Coloration: Skin is not pale.      Findings: No erythema or rash.      Nails: There is clubbing.      Comments:          Neurological:      Mental Status: She is alert and oriented to person, place, and time.      Sensory: Sensory deficit present.      Motor: Atrophy present.      Gait: Gait abnormal.   Psychiatric:         Attention and Perception: Attention normal.         Mood and Affect: Mood normal.         Speech: Speech normal.         Thought Content: Thought content normal.         Cognition and Memory: Cognition normal.         Judgment: Judgment normal.                  Assessment:       Encounter Diagnoses   Name Primary?    Stage 3 chronic kidney disease, unspecified whether stage 3a or 3b CKD Yes    Sensory peripheral neuropathy     Hammer toes of both feet     Pre-ulcerative calluses                Plan:       Manisha was seen today for foot ulcer and routine foot care.    Diagnoses and all orders for this visit:    Stage 3 chronic kidney disease, unspecified whether stage 3a or 3b CKD    Sensory peripheral neuropathy    Hammer toes of both feet    Pre-ulcerative calluses            I counseled the patient on her conditions, their implications and medical management.    - rediscussed padding in lamb's wool usage avoid any aggressive medications    - Shoe inspection. Patient instructed on proper foot hygeine. We discussed wearing proper shoe gear, daily foot  inspections, never walking without protective shoe gear, never putting sharp instruments to feet, routine podiatric nail visits every 2-3 months.      - With patient's permission, the toenails mentioned above were aggressively reduced and debrided using a nail nipper, removing all offending nail and debris. Utilizing a #15 scalpel, I trimmed the corns and calluses at the above mentioned location.      The patient will continue to monitor the areas daily, inspect the feet, wear protective shoe gear when ambulatory, and moisturizer to maintain skin integrity.     Monitor Right 3rd digit for any further breakdown    Continue proper shoe gear    Patient not interested in surgical intervention at this time    Follow-up 6 weeks sooner if needed

## 2023-07-06 ENCOUNTER — OFFICE VISIT (OUTPATIENT)
Dept: HEMATOLOGY/ONCOLOGY | Facility: CLINIC | Age: 73
End: 2023-07-06
Payer: MEDICARE

## 2023-07-06 VITALS
TEMPERATURE: 98 F | HEART RATE: 63 BPM | SYSTOLIC BLOOD PRESSURE: 123 MMHG | OXYGEN SATURATION: 95 % | WEIGHT: 156.75 LBS | RESPIRATION RATE: 17 BRPM | HEIGHT: 68 IN | DIASTOLIC BLOOD PRESSURE: 64 MMHG | BODY MASS INDEX: 23.76 KG/M2

## 2023-07-06 DIAGNOSIS — D69.6 THROMBOCYTOPENIA: Primary | ICD-10-CM

## 2023-07-06 DIAGNOSIS — D64.9 NORMOCYTIC ANEMIA: ICD-10-CM

## 2023-07-06 DIAGNOSIS — D89.89 LIGHT CHAIN DISEASE, KAPPA TYPE: ICD-10-CM

## 2023-07-06 DIAGNOSIS — E53.8 LOW SERUM VITAMIN B12: ICD-10-CM

## 2023-07-06 PROCEDURE — 1157F ADVNC CARE PLAN IN RCRD: CPT | Mod: CPTII,S$GLB,, | Performed by: STUDENT IN AN ORGANIZED HEALTH CARE EDUCATION/TRAINING PROGRAM

## 2023-07-06 PROCEDURE — 3008F BODY MASS INDEX DOCD: CPT | Mod: CPTII,S$GLB,, | Performed by: STUDENT IN AN ORGANIZED HEALTH CARE EDUCATION/TRAINING PROGRAM

## 2023-07-06 PROCEDURE — 4010F PR ACE/ARB THEARPY RXD/TAKEN: ICD-10-PCS | Mod: CPTII,S$GLB,, | Performed by: STUDENT IN AN ORGANIZED HEALTH CARE EDUCATION/TRAINING PROGRAM

## 2023-07-06 PROCEDURE — 3074F PR MOST RECENT SYSTOLIC BLOOD PRESSURE < 130 MM HG: ICD-10-PCS | Mod: CPTII,S$GLB,, | Performed by: STUDENT IN AN ORGANIZED HEALTH CARE EDUCATION/TRAINING PROGRAM

## 2023-07-06 PROCEDURE — 1126F PR PAIN SEVERITY QUANTIFIED, NO PAIN PRESENT: ICD-10-PCS | Mod: CPTII,S$GLB,, | Performed by: STUDENT IN AN ORGANIZED HEALTH CARE EDUCATION/TRAINING PROGRAM

## 2023-07-06 PROCEDURE — 99999 PR PBB SHADOW E&M-EST. PATIENT-LVL IV: ICD-10-PCS | Mod: PBBFAC,,, | Performed by: STUDENT IN AN ORGANIZED HEALTH CARE EDUCATION/TRAINING PROGRAM

## 2023-07-06 PROCEDURE — 1159F MED LIST DOCD IN RCRD: CPT | Mod: CPTII,S$GLB,, | Performed by: STUDENT IN AN ORGANIZED HEALTH CARE EDUCATION/TRAINING PROGRAM

## 2023-07-06 PROCEDURE — 99214 OFFICE O/P EST MOD 30 MIN: CPT | Mod: S$GLB,,, | Performed by: STUDENT IN AN ORGANIZED HEALTH CARE EDUCATION/TRAINING PROGRAM

## 2023-07-06 PROCEDURE — 1157F PR ADVANCE CARE PLAN OR EQUIV PRESENT IN MEDICAL RECORD: ICD-10-PCS | Mod: CPTII,S$GLB,, | Performed by: STUDENT IN AN ORGANIZED HEALTH CARE EDUCATION/TRAINING PROGRAM

## 2023-07-06 PROCEDURE — 3288F FALL RISK ASSESSMENT DOCD: CPT | Mod: CPTII,S$GLB,, | Performed by: STUDENT IN AN ORGANIZED HEALTH CARE EDUCATION/TRAINING PROGRAM

## 2023-07-06 PROCEDURE — 3288F PR FALLS RISK ASSESSMENT DOCUMENTED: ICD-10-PCS | Mod: CPTII,S$GLB,, | Performed by: STUDENT IN AN ORGANIZED HEALTH CARE EDUCATION/TRAINING PROGRAM

## 2023-07-06 PROCEDURE — 1101F PR PT FALLS ASSESS DOC 0-1 FALLS W/OUT INJ PAST YR: ICD-10-PCS | Mod: CPTII,S$GLB,, | Performed by: STUDENT IN AN ORGANIZED HEALTH CARE EDUCATION/TRAINING PROGRAM

## 2023-07-06 PROCEDURE — 1101F PT FALLS ASSESS-DOCD LE1/YR: CPT | Mod: CPTII,S$GLB,, | Performed by: STUDENT IN AN ORGANIZED HEALTH CARE EDUCATION/TRAINING PROGRAM

## 2023-07-06 PROCEDURE — 4010F ACE/ARB THERAPY RXD/TAKEN: CPT | Mod: CPTII,S$GLB,, | Performed by: STUDENT IN AN ORGANIZED HEALTH CARE EDUCATION/TRAINING PROGRAM

## 2023-07-06 PROCEDURE — 99999 PR PBB SHADOW E&M-EST. PATIENT-LVL IV: CPT | Mod: PBBFAC,,, | Performed by: STUDENT IN AN ORGANIZED HEALTH CARE EDUCATION/TRAINING PROGRAM

## 2023-07-06 PROCEDURE — 3078F PR MOST RECENT DIASTOLIC BLOOD PRESSURE < 80 MM HG: ICD-10-PCS | Mod: CPTII,S$GLB,, | Performed by: STUDENT IN AN ORGANIZED HEALTH CARE EDUCATION/TRAINING PROGRAM

## 2023-07-06 PROCEDURE — 3008F PR BODY MASS INDEX (BMI) DOCUMENTED: ICD-10-PCS | Mod: CPTII,S$GLB,, | Performed by: STUDENT IN AN ORGANIZED HEALTH CARE EDUCATION/TRAINING PROGRAM

## 2023-07-06 PROCEDURE — 1159F PR MEDICATION LIST DOCUMENTED IN MEDICAL RECORD: ICD-10-PCS | Mod: CPTII,S$GLB,, | Performed by: STUDENT IN AN ORGANIZED HEALTH CARE EDUCATION/TRAINING PROGRAM

## 2023-07-06 PROCEDURE — 3078F DIAST BP <80 MM HG: CPT | Mod: CPTII,S$GLB,, | Performed by: STUDENT IN AN ORGANIZED HEALTH CARE EDUCATION/TRAINING PROGRAM

## 2023-07-06 PROCEDURE — 3074F SYST BP LT 130 MM HG: CPT | Mod: CPTII,S$GLB,, | Performed by: STUDENT IN AN ORGANIZED HEALTH CARE EDUCATION/TRAINING PROGRAM

## 2023-07-06 PROCEDURE — 1126F AMNT PAIN NOTED NONE PRSNT: CPT | Mod: CPTII,S$GLB,, | Performed by: STUDENT IN AN ORGANIZED HEALTH CARE EDUCATION/TRAINING PROGRAM

## 2023-07-06 PROCEDURE — 99214 PR OFFICE/OUTPT VISIT, EST, LEVL IV, 30-39 MIN: ICD-10-PCS | Mod: S$GLB,,, | Performed by: STUDENT IN AN ORGANIZED HEALTH CARE EDUCATION/TRAINING PROGRAM

## 2023-07-06 RX ORDER — DENOSUMAB 60 MG/ML
60 INJECTION SUBCUTANEOUS
COMMUNITY
Start: 2023-02-23

## 2023-07-06 NOTE — PROGRESS NOTES
Hematology- Oncology Clinic Note :       RFV / chief complaint- Light chain disease, kappa type        HPI  Pt is a 72 y.o. female who  has a past medical history of Abnormal Pap smear, Atrial fibrillation, AV block, 1st degree (07/25/2012), C. difficile diarrhea, Cataract, Depression (07/24/2012), Facet arthritis of lumbar region (03/31/2015), Falls, Hyperlipidemia, Hypertension (07/24/2012), Neuropathy, Other specified cardiac dysrhythmias(427.89), Sleep apnea, Syncope (07/24/2012), and Tremors of nervous system.   Pt presents to the clinic today for abnormal light chain levels     Doing well no new complains. Fatigue better    Neuropathy- in feet, has had falls. stable  BLE swelling + chronic, stable     Other medical issues- HTN, HLD, has pacemaker in place       Reviewed past medical/surgical/social history    Past Medical History:   Diagnosis Date    Abnormal Pap smear     Atrial fibrillation     AV block, 1st degree 07/25/2012    C. difficile diarrhea     RESOLVED    Cataract     Depression 07/24/2012    Facet arthritis of lumbar region 03/31/2015    Falls     3 falls in the last 6 mos--noted 6/19/19    Hyperlipidemia     Hypertension 07/24/2012    Neuropathy     Other specified cardiac dysrhythmias(427.89)     Sleep apnea     Syncope 07/24/2012    Tremors of nervous system     hands bilaterally      Past Surgical History:   Procedure Laterality Date    APPLICATION OF CARTILAGE GRAFT Bilateral 12/19/2019    Procedure: APPLICATION, CARTILAGE GRAFT AURICULAR JEZ;  Surgeon: Martinez Flores III, MD;  Location: [facility];  Service: ENT;  Laterality: Bilateral;    CARDIAC PACEMAKER PLACEMENT  09/07/2012    [id number]    CATARACT EXTRACTION W/  INTRAOCULAR LENS IMPLANT Right 5/16/2022    Procedure: EXTRACTION, CATARACT, WITH IOL INSERTION;  Surgeon: Ines Aguilera MD;  Location: [facility];  Service: Ophthalmology;  Laterality: Right;  Catalys     CATARACT EXTRACTION W/  INTRAOCULAR LENS IMPLANT  Left 6/20/2022    Procedure: EXTRACTION, CATARACT, WITH IOL INSERTION;  Surgeon: Ines Aguilera MD;  Location: [facility] OR;  Service: Ophthalmology;  Laterality: Left;  Catalys     DILATION AND CURETTAGE OF UTERUS      Hx of precancerous cells?    ENDOSCOPIC ULTRASOUND OF UPPER GASTROINTESTINAL TRACT N/A 7/5/2019    Procedure: ULTRASOUND, UPPER GI TRACT, ENDOSCOPIC;  Surgeon: Kev Calderon MD;  Location: [facility] ENDO (2ND FLR);  Service: Endoscopy;  Laterality: N/A;  Pacemaker-Adam   appt confirmed-rb    MYELOGRAPHY N/A 5/4/2021    Procedure: Myelogram  CERVICAL, THORACIC AND LUMBAR;  Surgeon: [facility];  Location: [facility] OR 2ND FLR;  Service: Radiology;  Laterality: N/A;    NASAL SEPTOPLASTY N/A 12/19/2019    Procedure: SEPTOPLASTY, NOSE;  Surgeon: Martinez Flores III, MD;  Location: [facility] OR;  Service: ENT;  Laterality: N/A;  FOLLOW DR SALVATORE KIMBROUGH PROTOCOL    OPEN REDUCTION AND INTERNAL FIXATION (ORIF) OF FRACTURE OF DISTAL RADIUS Left 1/24/2020    Procedure: ORIF, FRACTURE, RADIUS, DISTAL-left;  Surgeon: Ellen Moe MD;  Location: [facility] OR;  Service: Orthopedics;  Laterality: Left;    POSTERIOR FUSION OF CERVICAL SPINE WITH LAMINECTOMY N/A 11/14/2022    Procedure: LAMINECTOMY, SPINE, CERVICAL, WITH POSTERIOR FUSION C3-C6;  Surgeon: Nicolette Cheek MD;  Location: [facility] OR 2ND FLR;  Service: Neurosurgery;  Laterality: N/A;  TORONTO III, ASA III, BLOOD TYPE AND SCREEN, NEUROMONITORING EMG SEP MEP, BRACE/HALO [place], BED REGULAR BED, HEADREST [place], POSITION PRONE, SPECIAL EQUIPMENT NIKI MIDDLETON, RADIOLOGY C-ARM, EXT. BONE STIMULATOR BIOMET    REPLACEMENT OF PACEMAKER GENERATOR Left 10/7/2022    Procedure: REPLACEMENT, PACEMAKER GENERATOR;  Surgeon: John Sheets MD;  Location: [facility] EP LAB;  Service: Cardiology;  Laterality: Left;  YAS, SSS, AVB, Dual PPM Gen Change,SJM, MAC ,MS, 3 prep,** Adam dcPPM in situ**    SURGICAL REMOVAL OF NASAL TURBINATE Bilateral 12/19/2019    Procedure:  "EXCISION, NASAL TURBINATE;  Surgeon: Martinez Flores III, MD;  Location: [facility];  Service: ENT;  Laterality: Bilateral;    TONSILLECTOMY      WISDOM TOOTH EXTRACTION        (Not in a hospital admission)    Review of patient's allergies indicates:  No Known Allergies   Social History     Tobacco Use    Smoking status: Former     Packs/day: 0.25     Years: 15.00     Pack years: 3.75     Types: Cigarettes     Quit date: 1982     Years since quittin.9    Smokeless tobacco: Former   Substance Use Topics    Alcohol use: Yes     Comment: no daily or heavy use, ~4 times per month      Family History   Adopted: Yes   Problem Relation Age of Onset    Amblyopia Neg Hx     Blindness Neg Hx     Cataracts Neg Hx     Glaucoma Neg Hx     Macular degeneration Neg Hx     Retinal detachment Neg Hx           Review of Systems :  ROS    A ten-point system review is obtained and negative except for what was stated in the Interval History / HPI          Physical Exam :  /64 (BP Location: Left arm, Patient Position: Sitting, BP Method: Small (Automatic))   Pulse 63   Temp 97.9 °F (36.6 °C) (Oral)   Resp 17   Ht 5' 8" (1.727 m)   Wt 71.1 kg (156 lb 12 oz)   LMP 10/01/2003   SpO2 95%   BMI 23.83 kg/m²   Wt Readings from Last 3 Encounters:   23 71.1 kg (156 lb 12 oz)   23 71.2 kg (157 lb)   23 71.4 kg (157 lb 6.5 oz)       Body mass index is 23.83 kg/m².      Physical Exam  Vitals and nursing note reviewed.   Constitutional:       General: She is not in acute distress.     Appearance: Normal appearance. She is not ill-appearing.   HENT:      Head: Normocephalic and atraumatic.      Right Ear: External ear normal.      Left Ear: External ear normal.      Mouth/Throat:      Pharynx: No oropharyngeal exudate.   Eyes:      General: No scleral icterus.        Right eye: No discharge.         Left eye: No discharge.   Cardiovascular:      Rate and Rhythm: Normal rate.   Pulmonary:      Effort: Pulmonary " effort is normal. No respiratory distress.   Abdominal:      General: There is no distension.   Musculoskeletal:         General: No deformity.      Cervical back: Normal range of motion and neck supple.   Skin:     Coloration: Skin is not jaundiced.      Findings: No bruising, erythema, lesion or rash.   Neurological:      General: No focal deficit present.      Mental Status: She is alert and oriented to person, place, and time. Mental status is at baseline.      Coordination: Coordination normal.      Gait: Gait normal.   Psychiatric:         Mood and Affect: Mood normal.         Behavior: Behavior normal.           Current Outpatient Medications   Medication Sig Dispense Refill    acetaminophen (TYLENOL) 500 MG tablet Take 2 tablets (1,000 mg total) by mouth every 8 (eight) hours.  0    buPROPion (WELLBUTRIN XL) 300 MG 24 hr tablet Take 1 tablet (300 mg total) by mouth once daily. 30 tablet 0    calcium-vitamin D tablet 600 mg-200 units Take 1 tablet by mouth once daily. 30 tablet 0    cyanocobalamin (VITAMIN B-12) 1000 MCG tablet Take 100 mcg by mouth once daily.      FLUoxetine 40 MG capsule Take 1 capsule (40 mg total) by mouth once daily. 30 capsule 0    gabapentin (NEURONTIN) 100 MG capsule 1 capsule 3 TIMES DAILY (route: oral)      lisdexamfetamine (VYVANSE) 70 MG capsule Take 1 capsule (70 mg total) by mouth every morning. 30 capsule 0    losartan (COZAAR) 25 MG tablet Take 1 tablet (25 mg total) by mouth once daily. 90 tablet 6    methyl salicylate-menthol 15-10% 15-10 % Crea Apply topically every evening. 113 g 0    pravastatin (PRAVACHOL) 20 MG tablet TAKE 1 TABLET EVERY DAY 90 tablet 3    PROLIA 60 mg/mL Syrg       propranoloL (INDERAL) 20 MG tablet Take 1 tablet (20 mg total) by mouth 2 (two) times daily. 180 tablet 3     No current facility-administered medications for this visit.       Pertinent Diagnostic studies:      Lab Visit on 07/03/2023   Component Date Value Ref Range Status    NGS Specimen  Type 07/03/2023 Blood   Final    Sodium 07/03/2023 141  136 - 145 mmol/L Final    Potassium 07/03/2023 4.3  3.5 - 5.1 mmol/L Final    Chloride 07/03/2023 107  95 - 110 mmol/L Final    CO2 07/03/2023 24  23 - 29 mmol/L Final    Glucose 07/03/2023 97  70 - 110 mg/dL Final    BUN 07/03/2023 25 (H)  8 - 23 mg/dL Final    Creatinine 07/03/2023 1.1  0.5 - 1.4 mg/dL Final    Calcium 07/03/2023 9.8  8.7 - 10.5 mg/dL Final    Total Protein 07/03/2023 7.5  6.0 - 8.4 g/dL Final    Albumin 07/03/2023 4.0  3.5 - 5.2 g/dL Final    Total Bilirubin 07/03/2023 0.7  0.1 - 1.0 mg/dL Final    Comment: For infants and newborns, interpretation of results should be based  on gestational age, weight and in agreement with clinical  observations.    Premature Infant recommended reference ranges:  Up to 24 hours.............<8.0 mg/dL  Up to 48 hours............<12.0 mg/dL  3-5 days..................<15.0 mg/dL  6-29 days.................<15.0 mg/dL      Alkaline Phosphatase 07/03/2023 56  55 - 135 U/L Final    AST 07/03/2023 19  10 - 40 U/L Final    ALT 07/03/2023 21  10 - 44 U/L Final    eGFR 07/03/2023 52.7 (A)  >60 mL/min/1.73 m^2 Final    Anion Gap 07/03/2023 10  8 - 16 mmol/L Final    WBC 07/03/2023 7.64  3.90 - 12.70 K/uL Final    RBC 07/03/2023 4.01  4.00 - 5.40 M/uL Final    Hemoglobin 07/03/2023 11.9 (L)  12.0 - 16.0 g/dL Final    Hematocrit 07/03/2023 36.0 (L)  37.0 - 48.5 % Final    MCV 07/03/2023 90  82 - 98 fL Final    MCH 07/03/2023 29.7  27.0 - 31.0 pg Final    MCHC 07/03/2023 33.1  32.0 - 36.0 g/dL Final    RDW 07/03/2023 13.2  11.5 - 14.5 % Final    Platelets 07/03/2023 172  150 - 450 K/uL Final    MPV 07/03/2023 10.7  9.2 - 12.9 fL Final    Immature Granulocytes 07/03/2023 0.3  0.0 - 0.5 % Final    Gran # (ANC) 07/03/2023 4.2  1.8 - 7.7 K/uL Final    Immature Grans (Abs) 07/03/2023 0.02  0.00 - 0.04 K/uL Final    Comment: Mild elevation in immature granulocytes is non specific and   can be seen in a variety of conditions  including stress response,   acute inflammation, trauma and pregnancy. Correlation with other   laboratory and clinical findings is essential.      Lymph # 07/03/2023 2.2  1.0 - 4.8 K/uL Final    Mono # 07/03/2023 1.0  0.3 - 1.0 K/uL Final    Eos # 07/03/2023 0.3  0.0 - 0.5 K/uL Final    Baso # 07/03/2023 0.04  0.00 - 0.20 K/uL Final    nRBC 07/03/2023 0  0 /100 WBC Final    Gran % 07/03/2023 54.3  38.0 - 73.0 % Final    Lymph % 07/03/2023 28.5  18.0 - 48.0 % Final    Mono % 07/03/2023 13.1  4.0 - 15.0 % Final    Eosinophil % 07/03/2023 3.3  0.0 - 8.0 % Final    Basophil % 07/03/2023 0.5  0.0 - 1.9 % Final    Differential Method 07/03/2023 Automated   Final                 Oncology History    No history exists.     Assessment :       1. Thrombocytopenia    2. Normocytic anemia    3. Light chain disease, kappa type    4. Low serum vitamin B12        Plan :     Bicytopenia   Mild normocytic anemia hb 9-10 range.   Thrombocytopenia 130s, asymptomatic   Copper zinc wnl. Hiv/Hep checked in 2022 wnl  B12 low normal - supplementation advised.   Hb better and Plt count improved to normal with b12 supplementation   Continue monitoring     Elevated kappa light chain   Latest Reference Range & Units 08/25/21 16:36 12/30/21 11:00 03/13/23 14:21   [place]kins Free Light Chains 0.33 - 1.94 mg/dL 3.44 (H) 2.99 (H) 3.24 (H)   Lambda Free Light Chains 0.57 - 2.63 mg/dL 1.93 1.79 2.23   Kappa/Lambda FLC Ratio 0.26 - 1.65  1.78 (H) 1.67 (H) 1.45   (H): Data is abnormally high  Mildly abn ratio. Monitor     CKD3a- stable monitor       Bilateral leg swelling- conservative measure. US Nov 2022- neg for DVT      Problem List Items Addressed This Visit    None  Visit Diagnoses       Thrombocytopenia    -  Primary    Normocytic anemia        Light chain disease, kappa type        Low serum vitamin B12                  Electronically signed by Xiao Fan    [facility]      Future Appointments   Date Time Provider Department  Center   8/16/2023  1:00 PM Patricia Rao DPM [facility] [address]   [facility]2023  [facility]   10/2/2023 10:00 AM HOME MONITOR DEVICE CHECK, [facility] [address]           This note was created with voice recognition software.  Grammatical, syntax and spelling errors may be inevitable.

## 2023-07-25 LAB
ANNOTATION COMMENT IMP: NORMAL
DX: NORMAL
NGS CLINCIAL TRIALS: NORMAL
NGS INTERPRETATION: NORMAL
NGS ONCOHEME PANEL GENE LIST: NORMAL
NGS PATHOGENIC MUTATIONS DETECTED: NORMAL
NGS REVIEWED BY:: NORMAL
NGS VARIANTS OF UNKNOWN SIGNIFICANCE: NORMAL
NGSHM RESULT, BLOOD: NORMAL
REF LAB TEST METHOD: NORMAL
SPECIMEN SOURCE: NORMAL
TEST PERFORMANCE INFO SPEC: NORMAL

## 2023-08-16 ENCOUNTER — OFFICE VISIT (OUTPATIENT)
Dept: PODIATRY | Facility: CLINIC | Age: 73
End: 2023-08-16
Payer: MEDICARE

## 2023-08-16 VITALS
BODY MASS INDEX: 23.95 KG/M2 | DIASTOLIC BLOOD PRESSURE: 66 MMHG | SYSTOLIC BLOOD PRESSURE: 110 MMHG | WEIGHT: 158 LBS | HEIGHT: 68 IN | HEART RATE: 62 BPM

## 2023-08-16 DIAGNOSIS — L84 PRE-ULCERATIVE CALLUSES: ICD-10-CM

## 2023-08-16 DIAGNOSIS — G60.8 SENSORY PERIPHERAL NEUROPATHY: ICD-10-CM

## 2023-08-16 DIAGNOSIS — M20.41 HAMMER TOES OF BOTH FEET: ICD-10-CM

## 2023-08-16 DIAGNOSIS — M20.42 HAMMER TOES OF BOTH FEET: ICD-10-CM

## 2023-08-16 DIAGNOSIS — N18.30 STAGE 3 CHRONIC KIDNEY DISEASE, UNSPECIFIED WHETHER STAGE 3A OR 3B CKD: Primary | ICD-10-CM

## 2023-08-16 PROCEDURE — 3008F BODY MASS INDEX DOCD: CPT | Mod: HCNC,CPTII,S$GLB, | Performed by: PODIATRIST

## 2023-08-16 PROCEDURE — 99999 PR PBB SHADOW E&M-EST. PATIENT-LVL III: CPT | Mod: PBBFAC,HCNC,, | Performed by: PODIATRIST

## 2023-08-16 PROCEDURE — 1125F AMNT PAIN NOTED PAIN PRSNT: CPT | Mod: HCNC,CPTII,S$GLB, | Performed by: PODIATRIST

## 2023-08-16 PROCEDURE — 4010F ACE/ARB THERAPY RXD/TAKEN: CPT | Mod: HCNC,CPTII,S$GLB, | Performed by: PODIATRIST

## 2023-08-16 PROCEDURE — 4010F PR ACE/ARB THEARPY RXD/TAKEN: ICD-10-PCS | Mod: HCNC,CPTII,S$GLB, | Performed by: PODIATRIST

## 2023-08-16 PROCEDURE — 1160F PR REVIEW ALL MEDS BY PRESCRIBER/CLIN PHARMACIST DOCUMENTED: ICD-10-PCS | Mod: HCNC,CPTII,S$GLB, | Performed by: PODIATRIST

## 2023-08-16 PROCEDURE — 1160F RVW MEDS BY RX/DR IN RCRD: CPT | Mod: HCNC,CPTII,S$GLB, | Performed by: PODIATRIST

## 2023-08-16 PROCEDURE — 1159F MED LIST DOCD IN RCRD: CPT | Mod: HCNC,CPTII,S$GLB, | Performed by: PODIATRIST

## 2023-08-16 PROCEDURE — 1157F ADVNC CARE PLAN IN RCRD: CPT | Mod: HCNC,CPTII,S$GLB, | Performed by: PODIATRIST

## 2023-08-16 PROCEDURE — 99213 OFFICE O/P EST LOW 20 MIN: CPT | Mod: HCNC,S$GLB,, | Performed by: PODIATRIST

## 2023-08-16 PROCEDURE — 3074F SYST BP LT 130 MM HG: CPT | Mod: HCNC,CPTII,S$GLB, | Performed by: PODIATRIST

## 2023-08-16 PROCEDURE — 1159F PR MEDICATION LIST DOCUMENTED IN MEDICAL RECORD: ICD-10-PCS | Mod: HCNC,CPTII,S$GLB, | Performed by: PODIATRIST

## 2023-08-16 PROCEDURE — 1125F PR PAIN SEVERITY QUANTIFIED, PAIN PRESENT: ICD-10-PCS | Mod: HCNC,CPTII,S$GLB, | Performed by: PODIATRIST

## 2023-08-16 PROCEDURE — 3078F DIAST BP <80 MM HG: CPT | Mod: HCNC,CPTII,S$GLB, | Performed by: PODIATRIST

## 2023-08-16 PROCEDURE — 99213 PR OFFICE/OUTPT VISIT, EST, LEVL III, 20-29 MIN: ICD-10-PCS | Mod: HCNC,S$GLB,, | Performed by: PODIATRIST

## 2023-08-16 PROCEDURE — 99999 PR PBB SHADOW E&M-EST. PATIENT-LVL III: ICD-10-PCS | Mod: PBBFAC,HCNC,, | Performed by: PODIATRIST

## 2023-08-16 PROCEDURE — 3008F PR BODY MASS INDEX (BMI) DOCUMENTED: ICD-10-PCS | Mod: HCNC,CPTII,S$GLB, | Performed by: PODIATRIST

## 2023-08-16 PROCEDURE — 3078F PR MOST RECENT DIASTOLIC BLOOD PRESSURE < 80 MM HG: ICD-10-PCS | Mod: HCNC,CPTII,S$GLB, | Performed by: PODIATRIST

## 2023-08-16 PROCEDURE — 1157F PR ADVANCE CARE PLAN OR EQUIV PRESENT IN MEDICAL RECORD: ICD-10-PCS | Mod: HCNC,CPTII,S$GLB, | Performed by: PODIATRIST

## 2023-08-16 PROCEDURE — 3074F PR MOST RECENT SYSTOLIC BLOOD PRESSURE < 130 MM HG: ICD-10-PCS | Mod: HCNC,CPTII,S$GLB, | Performed by: PODIATRIST

## 2023-08-16 RX ORDER — BUPROPION HYDROCHLORIDE 300 MG/1
1 TABLET ORAL DAILY
COMMUNITY
Start: 2022-12-15

## 2023-08-16 RX ORDER — LOSARTAN POTASSIUM 25 MG/1
1 TABLET ORAL DAILY
COMMUNITY
Start: 2022-12-15 | End: 2023-09-27 | Stop reason: SDUPTHER

## 2023-08-16 NOTE — PROGRESS NOTES
Subjective:      Patient ID: Manisha Wynne is a 72 y.o. female.    Chief Complaint:   Follow-up (Hammertoe pain bilateral )    Manisha is a 72 y.o. female who presents to the clinic for evaluation and treatment of feet  Manisha has a past medical history of Abnormal Pap smear, Atrial fibrillation, AV block, 1st degree (07/25/2012), C. difficile diarrhea, Cataract, Depression (07/24/2012), Facet arthritis of lumbar region (03/31/2015), Falls, Hyperlipidemia, Hypertension (07/24/2012), Neuropathy, Other specified cardiac dysrhythmias(427.89), Sleep apnea, Syncope (07/24/2012), and Tremors of nervous system.  .  This patient has documented high risk feet requiring routine maintenance secondary to peripheral neuropathy.     Pt relates she is better.. pain is about a 4.  Crest Pads have helped.  She has been washing them and re wearing them with her Crocs  She has a few out of town trips planned         PCP: Iris Blue MD    Date Last Seen by PCP: 7/6/23 heme onc    Current shoe gear:  Affected Foot : clogs Crocs        Hemoglobin A1C   Date Value Ref Range Status   08/05/2022 5.3 4.0 - 5.6 % Final     Comment:     ADA Screening Guidelines:  5.7-6.4%  Consistent with prediabetes  >or=6.5%  Consistent with diabetes    High levels of fetal hemoglobin interfere with the HbA1C  assay. Heterozygous hemoglobin variants (HbS, HgC, etc)do  not significantly interfere with this assay.   However, presence of multiple variants may affect accuracy.     06/10/2021 5.4 4.0 - 5.6 % Final     Comment:     ADA Screening Guidelines:  5.7-6.4%  Consistent with prediabetes  >or=6.5%  Consistent with diabetes    High levels of fetal hemoglobin interfere with the HbA1C  assay. Heterozygous hemoglobin variants (HbS, HgC, etc)do  not significantly interfere with this assay.   However, presence of multiple variants may affect accuracy.     06/10/2021 5.4 4.0 - 5.6 % Final     Comment:     ADA Screening Guidelines:  5.7-6.4%   Consistent with prediabetes  >or=6.5%  Consistent with diabetes    High levels of fetal hemoglobin interfere with the HbA1C  assay. Heterozygous hemoglobin variants (HbS, HgC, etc)do  not significantly interfere with this assay.   However, presence of multiple variants may affect accuracy.          Past Medical History:   Diagnosis Date    Abnormal Pap smear     Atrial fibrillation     AV block, 1st degree 07/25/2012    C. difficile diarrhea     RESOLVED    Cataract     Depression 07/24/2012    Facet arthritis of lumbar region 03/31/2015    Falls     3 falls in the last 6 mos--noted 6/19/19    Hyperlipidemia     Hypertension 07/24/2012    Neuropathy     Other specified cardiac dysrhythmias(427.89)     Sleep apnea     Syncope 07/24/2012    Tremors of nervous system     hands bilaterally     Past Surgical History:   Procedure Laterality Date    APPLICATION OF CARTILAGE GRAFT Bilateral 12/19/2019    Procedure: APPLICATION, CARTILAGE GRAFT AURICULAR JEZ;  Surgeon: Martinez Flores III, MD;  Location: [facility];  Service: ENT;  Laterality: Bilateral;    CARDIAC PACEMAKER PLACEMENT  09/07/2012    [id number]    CATARACT EXTRACTION W/  INTRAOCULAR LENS IMPLANT Right 5/16/2022    Procedure: EXTRACTION, CATARACT, WITH IOL INSERTION;  Surgeon: Ines Aguilera MD;  Location: [facility] OR;  Service: Ophthalmology;  Laterality: Right;  Catalys     CATARACT EXTRACTION W/  INTRAOCULAR LENS IMPLANT Left 6/20/2022    Procedure: EXTRACTION, CATARACT, WITH IOL INSERTION;  Surgeon: Ines Aguilera MD;  Location: [facility] OR;  Service: Ophthalmology;  Laterality: Left;  Catalys     DILATION AND CURETTAGE OF UTERUS      Hx of precancerous cells?    ENDOSCOPIC ULTRASOUND OF UPPER GASTROINTESTINAL TRACT N/A 7/5/2019    Procedure: ULTRASOUND, UPPER GI TRACT, ENDOSCOPIC;  Surgeon: Kev Calderon MD;  Location: [facility] ([address] [place] [address]);  Service: Endoscopy;  Laterality: N/A;  Pacemaker-Adma   appt confirmed-rb    MYELOGRAPHY N/A 5/4/2021     Procedure: Myelogram  CERVICAL, THORACIC AND LUMBAR;  Surgeon: Leandro Diagnostic Provider;  Location: [address];  Service: Radiology;  Laterality: N/A;    NASAL SEPTOPLASTY N/A 12/19/2019    Procedure: SEPTOPLASTY, NOSE;  Surgeon: Martinez Flores III, MD;  Location: [facility];  Service: ENT;  Laterality: N/A;  FOLLOW DR SALVATORE KIMBROUGH PROTOCOL    OPEN REDUCTION AND INTERNAL FIXATION (ORIF) OF FRACTURE OF DISTAL RADIUS Left 1/24/2020    Procedure: ORIF, FRACTURE, RADIUS, DISTAL-left;  Surgeon: Ellen Moe MD;  Location: [facility] OR;  Service: Orthopedics;  Laterality: Left;    POSTERIOR FUSION OF CERVICAL SPINE WITH LAMINECTOMY N/A 11/14/2022    Procedure: LAMINECTOMY, SPINE, CERVICAL, WITH POSTERIOR FUSION C3-C6;  Surgeon: Nicolette Cheek MD;  Location: [address];  Service: Neurosurgery;  Laterality: N/A;  TORONTO III, ASA III, BLOOD TYPE AND SCREEN, NEUROMONITORING EMG SEP MEP, BRACE/HALO [place], BED REGULAR BED, HEADREST [place], POSITION PRONE, SPECIAL EQUIPMENT NIKI [place], RADIOLOGY C-ARM, EXT. BONE STIMULATOR BIOMET    REPLACEMENT OF PACEMAKER GENERATOR Left 10/7/2022    Procedure: REPLACEMENT, PACEMAKER GENERATOR;  Surgeon: John Sheets MD;  Location: [facility] EP LAB;  Service: Cardiology;  Laterality: Left;  YAS, SSS, AVB, Dual PPM Gen Change,SJM, MAC ,MN, 3 prep,** Adam dcPPM in situ**    SURGICAL REMOVAL OF NASAL TURBINATE Bilateral 12/19/2019    Procedure: EXCISION, NASAL TURBINATE;  Surgeon: Martinez Flores III, MD;  Location: [facility];  Service: ENT;  Laterality: Bilateral;    TONSILLECTOMY      WISDOM TOOTH EXTRACTION       Current Outpatient Medications on File Prior to Visit   Medication Sig Dispense Refill    acetaminophen (TYLENOL) 500 MG tablet Take 2 tablets (1,000 mg total) by mouth every 8 (eight) hours.  0    buPROPion (WELLBUTRIN XL) 300 MG 24 hr tablet Take 1 tablet by mouth once daily.      cyanocobalamin (VITAMIN B-12) 1000 MCG tablet Take 100 mcg by mouth once daily.       gabapentin (NEURONTIN) 100 MG capsule 1 capsule 3 TIMES DAILY (route: oral)      lisdexamfetamine (VYVANSE) 70 MG capsule Take 1 capsule (70 mg total) by mouth every morning. 30 capsule 0    losartan (COZAAR) 25 MG tablet Take 1 tablet (25 mg total) by mouth once daily. 90 tablet 6    losartan (COZAAR) 25 MG tablet Take 1 tablet by mouth once daily.      methyl salicylate-menthol 15-10% 15-10 % Crea Apply topically every evening. 113 g 0    pravastatin (PRAVACHOL) 20 MG tablet TAKE 1 TABLET EVERY DAY 90 tablet 3    PROLIA 60 mg/mL Syrg       propranoloL (INDERAL) 20 MG tablet Take 1 tablet (20 mg total) by mouth 2 (two) times daily. 180 tablet 3    calcium-vitamin D tablet 600 mg-200 units Take 1 tablet by mouth once daily. 30 tablet 0    FLUoxetine 40 MG capsule Take 1 capsule (40 mg total) by mouth once daily. 30 capsule 0     No current facility-administered medications on file prior to visit.     Review of patient's allergies indicates:  No Known Allergies    Review of Systems   Constitutional: Negative for chills, decreased appetite, fever, malaise/fatigue, night sweats, weight gain and weight loss.   Eyes:         Poor eyesight   Cardiovascular:  Negative for chest pain, claudication, dyspnea on exertion, leg swelling, palpitations and syncope.   Respiratory:  Negative for cough and shortness of breath.    Endocrine: Negative for cold intolerance and heat intolerance.   Hematologic/Lymphatic: Negative for bleeding problem. Does not bruise/bleed easily.   Skin:  Positive for nail changes. Negative for color change, dry skin, flushing, itching, poor wound healing, rash, skin cancer, suspicious lesions and unusual hair distribution.   Musculoskeletal:  Positive for arthritis and stiffness. Negative for back pain, falls, gout, joint pain, joint swelling, muscle cramps, muscle weakness, myalgias and neck pain.   Gastrointestinal:  Negative for diarrhea, nausea and vomiting.   Neurological:  Positive for numbness  "and paresthesias. Negative for dizziness, focal weakness, light-headedness, tremors, vertigo and weakness.   Psychiatric/Behavioral:  Negative for altered mental status and depression. The patient does not have insomnia.    Allergic/Immunologic: Negative.            Objective:       Vitals:    08/16/23 1312   BP: 110/66   Pulse: 62   Weight: 71.7 kg (158 lb)   Height: 5' 8" (1.727 m)   PainSc:   4   PainLoc: Toe   71.7 kg (158 lb) afeb    Physical Exam  Vitals reviewed.   Constitutional:       General: She is not in acute distress.     Appearance: She is well-developed. She is not ill-appearing, toxic-appearing or diaphoretic.      Comments: Proper supportive shoegear   Cardiovascular:      Pulses:           Dorsalis pedis pulses are 2+ on the right side and 2+ on the left side.        Posterior tibial pulses are 1+ on the right side and 1+ on the left side.      Comments: No edema to digits  Musculoskeletal:         General: Deformity present.      Right lower leg: No edema.      Left lower leg: No edema.      Right foot: Decreased range of motion. Deformity, prominent metatarsal heads and tenderness present. No bony tenderness.      Left foot: Decreased range of motion. Deformity and prominent metatarsal heads present. No tenderness or bony tenderness.      Comments:    Semi- Reducible extensor and flexor contractures at the MTPJ and PIPJ of toes 2-5, bilat.      No other areas of pain    Positive pain with debridement of 3rd digit pre ulcerative callus right   Feet:      Right foot:      Protective Sensation: 10 sites tested.  0 sites sensed.      Skin integrity: Skin breakdown and callus present. No ulcer, blister, erythema, warmth or dry skin.      Toenail Condition: Right toenails are abnormally thick. Fungal disease present.     Left foot:      Protective Sensation: 10 sites tested.  0 sites sensed.      Skin integrity: No ulcer, blister, skin breakdown, erythema, warmth, callus or dry skin.      Toenail " Condition: Left toenails are abnormally thick. Fungal disease present.     Comments:  No soi    Positive hemosiderin /preulcer to distal 3rd digit right   No other areas of breakdown interspaces appear clear         Skin:     General: Skin is warm and dry.      Capillary Refill: Capillary refill takes 2 to 3 seconds.      Coloration: Skin is not pale.      Findings: No erythema or rash.      Nails: There is clubbing.      Comments:          Neurological:      Mental Status: She is alert and oriented to person, place, and time.      Sensory: Sensory deficit present.      Motor: Atrophy present.      Gait: Gait abnormal.   Psychiatric:         Attention and Perception: Attention normal.         Mood and Affect: Mood normal.         Speech: Speech normal.         Thought Content: Thought content normal.         Cognition and Memory: Cognition normal.         Judgment: Judgment normal.                    Assessment:       Encounter Diagnoses   Name Primary?    Stage 3 chronic kidney disease, unspecified whether stage 3a or 3b CKD Yes    Hammer toes of both feet     Sensory peripheral neuropathy     Pre-ulcerative calluses                  Plan:       Manisha was seen today for follow-up.    Diagnoses and all orders for this visit:    Stage 3 chronic kidney disease, unspecified whether stage 3a or 3b CKD    Hammer toes of both feet    Sensory peripheral neuropathy    Pre-ulcerative calluses              I counseled the patient on her conditions, their implications and medical management.     Continue crest pads dispensed extra    - Shoe inspection. Patient instructed on proper foot hygeine. We discussed wearing proper shoe gear, daily foot inspections, never walking without protective shoe gear, never putting sharp instruments to feet, routine podiatric nail visits every 2-3 months.      - With patient's permission, the toenails mentioned above were aggressively reduced and debrided using a nail nipper, removing all  offending nail and debris. Utilizing a #15 scalpel, I trimmed the corns and calluses at the above mentioned location.      The patient will continue to monitor the areas daily, inspect the feet, wear protective shoe gear when ambulatory, and moisturizer to maintain skin integrity.     Monitor Right 3rd digit for any further breakdown    Continue proper shoe gear  Patient is now willing to consider tenotomy will set patient up with Dr. Ho for evaluation and discussion    prn

## 2023-08-25 ENCOUNTER — INFUSION (OUTPATIENT)
Dept: INFECTIOUS DISEASES | Facility: HOSPITAL | Age: 73
End: 2023-08-25
Payer: MEDICARE

## 2023-08-25 VITALS
DIASTOLIC BLOOD PRESSURE: 68 MMHG | TEMPERATURE: 98 F | HEIGHT: 68 IN | OXYGEN SATURATION: 100 % | SYSTOLIC BLOOD PRESSURE: 150 MMHG | BODY MASS INDEX: 24.39 KG/M2 | WEIGHT: 160.94 LBS | RESPIRATION RATE: 18 BRPM | HEART RATE: 60 BPM

## 2023-08-25 DIAGNOSIS — M81.8 OTHER OSTEOPOROSIS WITHOUT CURRENT PATHOLOGICAL FRACTURE: Primary | ICD-10-CM

## 2023-08-25 DIAGNOSIS — N18.30 STAGE 3 CHRONIC KIDNEY DISEASE, UNSPECIFIED WHETHER STAGE 3A OR 3B CKD: ICD-10-CM

## 2023-08-25 PROCEDURE — 63600175 PHARM REV CODE 636 W HCPCS: Mod: JZ,JG,HCNC | Performed by: INTERNAL MEDICINE

## 2023-08-25 PROCEDURE — 96372 THER/PROPH/DIAG INJ SC/IM: CPT | Mod: HCNC

## 2023-08-25 RX ADMIN — DENOSUMAB 60 MG: 60 INJECTION SUBCUTANEOUS at 02:08

## 2023-08-25 NOTE — PROGRESS NOTES
Patient arrives for prolia injection - confirms use of calcium and vitamin D supplements and denies dental procedures over past 3 months - administered per guidelines    Next appointment scheduled    Limited head-to-toe assessment due to privacy issues and visit reason though the opportunity was given for patient to express any concerns

## 2023-08-25 NOTE — PLAN OF CARE
Patient counseled on fall risk assessment and prevention at home and in public - verbalized understanding

## 2023-09-27 ENCOUNTER — OFFICE VISIT (OUTPATIENT)
Dept: INTERNAL MEDICINE | Facility: CLINIC | Age: 73
End: 2023-09-27
Payer: MEDICARE

## 2023-09-27 ENCOUNTER — TELEPHONE (OUTPATIENT)
Dept: PODIATRY | Facility: CLINIC | Age: 73
End: 2023-09-27
Payer: MEDICARE

## 2023-09-27 VITALS
HEIGHT: 68 IN | SYSTOLIC BLOOD PRESSURE: 124 MMHG | DIASTOLIC BLOOD PRESSURE: 78 MMHG | OXYGEN SATURATION: 100 % | BODY MASS INDEX: 24.22 KG/M2 | HEART RATE: 70 BPM | WEIGHT: 159.81 LBS

## 2023-09-27 DIAGNOSIS — Z00.00 ENCOUNTER FOR MEDICARE ANNUAL WELLNESS EXAM: ICD-10-CM

## 2023-09-27 DIAGNOSIS — I44.0 AV BLOCK, 1ST DEGREE: Chronic | ICD-10-CM

## 2023-09-27 DIAGNOSIS — E21.0 PRIMARY HYPERPARATHYROIDISM: ICD-10-CM

## 2023-09-27 DIAGNOSIS — M48.061 SPINAL STENOSIS OF LUMBAR REGION, UNSPECIFIED WHETHER NEUROGENIC CLAUDICATION PRESENT: ICD-10-CM

## 2023-09-27 DIAGNOSIS — F33.0 MILD EPISODE OF RECURRENT MAJOR DEPRESSIVE DISORDER: ICD-10-CM

## 2023-09-27 DIAGNOSIS — Z95.0 CARDIAC PACEMAKER IN SITU: ICD-10-CM

## 2023-09-27 DIAGNOSIS — N18.30 STAGE 3 CHRONIC KIDNEY DISEASE, UNSPECIFIED WHETHER STAGE 3A OR 3B CKD: ICD-10-CM

## 2023-09-27 DIAGNOSIS — I77.89 OTHER SPECIFIED DISORDERS OF ARTERIES AND ARTERIOLES: ICD-10-CM

## 2023-09-27 DIAGNOSIS — I70.0 AORTIC CALCIFICATION: ICD-10-CM

## 2023-09-27 DIAGNOSIS — M81.8 OTHER OSTEOPOROSIS WITHOUT CURRENT PATHOLOGICAL FRACTURE: ICD-10-CM

## 2023-09-27 DIAGNOSIS — E04.2 GOITER, NONTOXIC, MULTINODULAR: ICD-10-CM

## 2023-09-27 DIAGNOSIS — Z74.09 OTHER REDUCED MOBILITY: ICD-10-CM

## 2023-09-27 DIAGNOSIS — I49.5 SSS (SICK SINUS SYNDROME): Chronic | ICD-10-CM

## 2023-09-27 DIAGNOSIS — Z98.1 S/P CERVICAL SPINAL FUSION: ICD-10-CM

## 2023-09-27 DIAGNOSIS — I10 PRIMARY HYPERTENSION: Chronic | ICD-10-CM

## 2023-09-27 DIAGNOSIS — G95.9 CERVICAL MYELOPATHY: ICD-10-CM

## 2023-09-27 DIAGNOSIS — G60.8 SENSORY PERIPHERAL NEUROPATHY: ICD-10-CM

## 2023-09-27 DIAGNOSIS — I48.91 ATRIAL FIBRILLATION, UNSPECIFIED TYPE: ICD-10-CM

## 2023-09-27 DIAGNOSIS — Z00.00 ENCOUNTER FOR PREVENTIVE HEALTH EXAMINATION: Primary | ICD-10-CM

## 2023-09-27 DIAGNOSIS — E78.5 DYSLIPIDEMIA: ICD-10-CM

## 2023-09-27 PROBLEM — R29.898 DECREASED GRIP STRENGTH OF LEFT HAND: Status: RESOLVED | Noted: 2020-02-19 | Resolved: 2023-09-27

## 2023-09-27 PROBLEM — R29.3 POSTURE ABNORMALITY: Status: RESOLVED | Noted: 2023-04-25 | Resolved: 2023-09-27

## 2023-09-27 PROBLEM — R29.898 DECREASED PINCH STRENGTH: Status: RESOLVED | Noted: 2020-02-19 | Resolved: 2023-09-27

## 2023-09-27 PROBLEM — H61.23 CERUMEN DEBRIS ON TYMPANIC MEMBRANE OF BOTH EARS: Status: RESOLVED | Noted: 2020-09-15 | Resolved: 2023-09-27

## 2023-09-27 PROBLEM — R19.7 DIARRHEA: Status: RESOLVED | Noted: 2021-07-12 | Resolved: 2023-09-27

## 2023-09-27 PROBLEM — M25.60 RANGE OF MOTION DEFICIT: Status: RESOLVED | Noted: 2020-02-19 | Resolved: 2023-09-27

## 2023-09-27 PROBLEM — M62.81 MUSCLE WEAKNESS: Status: RESOLVED | Noted: 2021-12-10 | Resolved: 2023-09-27

## 2023-09-27 PROCEDURE — 1160F PR REVIEW ALL MEDS BY PRESCRIBER/CLIN PHARMACIST DOCUMENTED: ICD-10-PCS | Mod: CPTII,S$GLB,, | Performed by: NURSE PRACTITIONER

## 2023-09-27 PROCEDURE — 1157F PR ADVANCE CARE PLAN OR EQUIV PRESENT IN MEDICAL RECORD: ICD-10-PCS | Mod: CPTII,S$GLB,, | Performed by: NURSE PRACTITIONER

## 2023-09-27 PROCEDURE — 4010F ACE/ARB THERAPY RXD/TAKEN: CPT | Mod: CPTII,S$GLB,, | Performed by: NURSE PRACTITIONER

## 2023-09-27 PROCEDURE — 3078F DIAST BP <80 MM HG: CPT | Mod: CPTII,S$GLB,, | Performed by: NURSE PRACTITIONER

## 2023-09-27 PROCEDURE — 1159F MED LIST DOCD IN RCRD: CPT | Mod: CPTII,S$GLB,, | Performed by: NURSE PRACTITIONER

## 2023-09-27 PROCEDURE — 1170F PR FUNCTIONAL STATUS ASSESSED: ICD-10-PCS | Mod: CPTII,S$GLB,, | Performed by: NURSE PRACTITIONER

## 2023-09-27 PROCEDURE — 3078F PR MOST RECENT DIASTOLIC BLOOD PRESSURE < 80 MM HG: ICD-10-PCS | Mod: CPTII,S$GLB,, | Performed by: NURSE PRACTITIONER

## 2023-09-27 PROCEDURE — 1100F PR PT FALLS ASSESS DOC 2+ FALLS/FALL W/INJURY/YR: ICD-10-PCS | Mod: CPTII,S$GLB,, | Performed by: NURSE PRACTITIONER

## 2023-09-27 PROCEDURE — 1126F PR PAIN SEVERITY QUANTIFIED, NO PAIN PRESENT: ICD-10-PCS | Mod: CPTII,S$GLB,, | Performed by: NURSE PRACTITIONER

## 2023-09-27 PROCEDURE — 1159F PR MEDICATION LIST DOCUMENTED IN MEDICAL RECORD: ICD-10-PCS | Mod: CPTII,S$GLB,, | Performed by: NURSE PRACTITIONER

## 2023-09-27 PROCEDURE — 1157F ADVNC CARE PLAN IN RCRD: CPT | Mod: CPTII,S$GLB,, | Performed by: NURSE PRACTITIONER

## 2023-09-27 PROCEDURE — 99999 PR PBB SHADOW E&M-EST. PATIENT-LVL V: ICD-10-PCS | Mod: PBBFAC,,, | Performed by: NURSE PRACTITIONER

## 2023-09-27 PROCEDURE — 3288F PR FALLS RISK ASSESSMENT DOCUMENTED: ICD-10-PCS | Mod: CPTII,S$GLB,, | Performed by: NURSE PRACTITIONER

## 2023-09-27 PROCEDURE — 3288F FALL RISK ASSESSMENT DOCD: CPT | Mod: CPTII,S$GLB,, | Performed by: NURSE PRACTITIONER

## 2023-09-27 PROCEDURE — 99999 PR PBB SHADOW E&M-EST. PATIENT-LVL V: CPT | Mod: PBBFAC,,, | Performed by: NURSE PRACTITIONER

## 2023-09-27 PROCEDURE — 3074F SYST BP LT 130 MM HG: CPT | Mod: CPTII,S$GLB,, | Performed by: NURSE PRACTITIONER

## 2023-09-27 PROCEDURE — 1170F FXNL STATUS ASSESSED: CPT | Mod: CPTII,S$GLB,, | Performed by: NURSE PRACTITIONER

## 2023-09-27 PROCEDURE — G0439 PPPS, SUBSEQ VISIT: HCPCS | Mod: S$GLB,,, | Performed by: NURSE PRACTITIONER

## 2023-09-27 PROCEDURE — 1100F PTFALLS ASSESS-DOCD GE2>/YR: CPT | Mod: CPTII,S$GLB,, | Performed by: NURSE PRACTITIONER

## 2023-09-27 PROCEDURE — 4010F PR ACE/ARB THEARPY RXD/TAKEN: ICD-10-PCS | Mod: CPTII,S$GLB,, | Performed by: NURSE PRACTITIONER

## 2023-09-27 PROCEDURE — G0439 PR MEDICARE ANNUAL WELLNESS SUBSEQUENT VISIT: ICD-10-PCS | Mod: S$GLB,,, | Performed by: NURSE PRACTITIONER

## 2023-09-27 PROCEDURE — 1126F AMNT PAIN NOTED NONE PRSNT: CPT | Mod: CPTII,S$GLB,, | Performed by: NURSE PRACTITIONER

## 2023-09-27 PROCEDURE — 3074F PR MOST RECENT SYSTOLIC BLOOD PRESSURE < 130 MM HG: ICD-10-PCS | Mod: CPTII,S$GLB,, | Performed by: NURSE PRACTITIONER

## 2023-09-27 PROCEDURE — 1160F RVW MEDS BY RX/DR IN RCRD: CPT | Mod: CPTII,S$GLB,, | Performed by: NURSE PRACTITIONER

## 2023-09-27 NOTE — PROGRESS NOTES
"  Manisha Wynne presented for a  Medicare AWV and comprehensive Health Risk Assessment today. The following components were reviewed and updated:    Medical history  Family History  Social history  Allergies and Current Medications  Health Risk Assessment  Health Maintenance  Care Team         ** See Completed Assessments for Annual Wellness Visit within the encounter summary.**         The following assessments were completed:  Living Situation  CAGE  Depression Screening  Timed Get Up and Go  Whisper Test  Cognitive Function Screening      Nutrition Screening  ADL Screening  PAQ Screening  OPIOID Screening: Patient does not have a prescription for narcotics. Patient does not use substance         Vitals:    09/27/23 1301   BP: 124/78   BP Location: Left arm   Pulse: 70   SpO2: 100%   Weight: 72.5 kg (159 lb 13.3 oz)   Height: 5' 8" (1.727 m)     Body mass index is 24.3 kg/m².  Physical Exam  Vitals and nursing note reviewed.   Constitutional:       Appearance: She is well-developed.   HENT:      Head: Normocephalic.   Cardiovascular:      Rate and Rhythm: Normal rate and regular rhythm.      Heart sounds: Normal heart sounds. No murmur heard.  Pulmonary:      Effort: Pulmonary effort is normal.      Breath sounds: Normal breath sounds.   Abdominal:      General: Bowel sounds are normal.      Palpations: Abdomen is soft.   Musculoskeletal:         General: Normal range of motion.   Skin:     General: Skin is warm and dry.   Neurological:      Mental Status: She is alert and oriented to person, place, and time.      Motor: No abnormal muscle tone.   Psychiatric:         Mood and Affect: Mood normal.               Diagnoses and health risks identified today and associated recommendations/orders:    1. Encounter for preventive health examination  Here for Health Risk Assessment/Annual Wellness Visit.  Health maintenance reviewed and updated. Follow up in one year.     2. Primary hypertension  Chronic, stable on " current medications. Followed by PCP, Cardiology.     3. Aortic calcification  Chronic, stable on current medications. Noted CXR 7/25/22. Followed by PCP, Cardiology.     4. Atrial fibrillation, unspecified type  Chronic, stable on current medications. Followed by PCP, Cardiology.     5. AV block, 1st degree  Chronic, stable. Followed by PCP, Cardiology.     6. SSS (sick sinus syndrome)  Chronic, stable with pacemaker in place Followed by PCP, Cardiology.     7. Cardiac pacemaker in situ  Followed by Cardiology.     8. Other specified disorders of arteries and arterioles  Chronic, stable on current medications. Followed by PCP, Cardiology.     9. Dyslipidemia  Chronic, stable on current medication. Followed by PCP, Cardiology.     10. Sensory peripheral neuropathy  Chronic, stable on current medication. Followed by PCP.     11. Stage 3 chronic kidney disease, unspecified whether stage 3a or 3b CKD  Chronic, stable on current medications. Followed by PCP.     12. Primary hyperparathyroidism  Chronic, stable on current medications. Followed by PCP, Endocrinology.     13. Mild episode of recurrent major depressive disorder  Chronic, stable on current medications. PHQ-2 score 1. Followed by outside Psychiatry. PCP.     14. Other osteoporosis without current pathological fracture  Chronic, stable on current medications/Prolia. Followed by PCP, Endocrinology.    15. Goiter, nontoxic, multinodular  Chronic, stable. Followed by PCP, Endocrinology.    16. Spinal stenosis of lumbar region, unspecified whether neurogenic claudication present  Chronic, stable on current medications/SP fusion. Followed by PCP, Neurosurgery.     17. Cervical myelopathy  Chronic, stable on current medications/SP fusion. Followed by PCP, Neurosurgery.     18. S/P cervical spinal fusion  Followed by Neurosurgery.     19. Other reduced mobility  Chronic, reports falls post surgery due to balance issues. No significant injury from falls. No assistive  device for ambulation. Followed by PCP    20. Encounter for Medicare annual wellness exam  - Ambulatory Referral/Consult to Enhanced Annual Wellness Visit (eAWV)      Provided Manisha with a 5-10 year written screening schedule and personal prevention plan. Recommendations were developed using the USPSTF age appropriate recommendations. Education, counseling, and referrals were provided as needed. After Visit Summary printed and given to patient which includes a list of additional screenings\tests needed.    Follow up in about 3 months (around 12/23/2023).with PCP    Jenifer Lee NP

## 2023-09-27 NOTE — PATIENT INSTRUCTIONS
Counseling and Referral of Other Preventative  (Italic type indicates deductible and co-insurance are waived)    Patient Name: Manisha Wynne  Today's Date: 9/27/2023    Health Maintenance       Date Due Completion Date    Influenza Vaccine (1) 09/01/2023 10/13/2022    Lipid Panel 03/01/2024 3/1/2023    DEXA Scan 08/02/2024 8/2/2022    Colorectal Cancer Screening 08/17/2025 8/17/2015    TETANUS VACCINE 06/04/2031 6/4/2021        No orders of the defined types were placed in this encounter.    The following information is provided to all patients.  This information is to help you find resources for any of the problems found today that may be affecting your health:                Living healthy guide: www.[facility].[place].gov      Understanding Diabetes: www.diabetes.org      Eating healthy: www.cdc.gov/healthyweight      [facility] home safety checklist: www.cdc.gov/steadi/patient.html      Agency on Aging: www.goea.[place].gov      Alcoholics anonymous (AA): www.aa.org      Physical Activity: www.vani.nih.gov/pa2frjk      Tobacco use: www.quitwithusla.org

## 2023-09-27 NOTE — TELEPHONE ENCOUNTER
Left voice message for patient to give our office a call back at [phone]. Dr. Ho office to help with rescheduling appointment

## 2023-10-02 ENCOUNTER — CLINICAL SUPPORT (OUTPATIENT)
Dept: CARDIOLOGY | Facility: HOSPITAL | Age: 73
End: 2023-10-02
Payer: MEDICARE

## 2023-10-02 DIAGNOSIS — Z95.0 PRESENCE OF CARDIAC PACEMAKER: ICD-10-CM

## 2023-10-02 PROCEDURE — 93296 REM INTERROG EVL PM/IDS: CPT | Performed by: INTERNAL MEDICINE

## 2023-10-04 ENCOUNTER — PATIENT MESSAGE (OUTPATIENT)
Dept: INTERNAL MEDICINE | Facility: CLINIC | Age: 73
End: 2023-10-04
Payer: MEDICARE

## 2023-10-04 ENCOUNTER — TELEPHONE (OUTPATIENT)
Dept: PODIATRY | Facility: CLINIC | Age: 73
End: 2023-10-04
Payer: MEDICARE

## 2023-10-04 ENCOUNTER — PATIENT MESSAGE (OUTPATIENT)
Dept: ADMINISTRATIVE | Facility: HOSPITAL | Age: 73
End: 2023-10-04
Payer: MEDICARE

## 2023-10-10 NOTE — TELEPHONE ENCOUNTER
----- Message from Luz Barber sent at 10/10/2023 12:07 PM CDT -----  Contact: [phone] @ patient  Requesting an RX refill or new RX.gabapentin (NEURONTIN) 100 MG capsule  Is this a refill or new RX: refill   RX name and strength (copy/paste from chart):    Is this a 30 day or 90 day RX:   Pharmacy name and phone #   [facility] - [facility] [address]   Phone:  [phone]  The doctors have asked that we provide their patients with the following 2 reminders -- prescription refills can take up to 72 hours, and a friendly reminder that in the future you can use your [facility] account to request refills:

## 2023-10-10 NOTE — TELEPHONE ENCOUNTER
No care due was identified.  [facility] Embedded Care Due Messages. Reference number: [id number].   10/10/2023 12:18:32 PM CDT

## 2023-10-17 ENCOUNTER — PATIENT MESSAGE (OUTPATIENT)
Dept: INTERNAL MEDICINE | Facility: CLINIC | Age: 73
End: 2023-10-17
Payer: MEDICARE

## 2023-10-18 RX ORDER — GABAPENTIN 100 MG/1
CAPSULE ORAL
Qty: 180 CAPSULE | Refills: 3 | Status: SHIPPED | OUTPATIENT
Start: 2023-10-18

## 2023-10-21 NOTE — ASSESSMENT & PLAN NOTE
Surgical indications are ckd but surgery would be more difficult due to location   For now we will observe Calcium every 6 months if hypercalcemia becomes a significant problem we can do medical management with cinacalcet       Avoid dehydration, excessive calcium supplementation and meds (HCTZ)  that can worsen hypercalcemia.     
As above     
Based on fragility fracture    Repeat bone density scheduled    She is at very high risk for subsequent falls and fractures.  Discussed Prolia and she will look into it.  She needs to finish her dental work 1st  
controlled  Avoid hctz   
Statement Selected

## 2023-10-24 ENCOUNTER — OFFICE VISIT (OUTPATIENT)
Dept: PODIATRY | Facility: CLINIC | Age: 73
End: 2023-10-24
Payer: MEDICARE

## 2023-10-24 VITALS
DIASTOLIC BLOOD PRESSURE: 79 MMHG | BODY MASS INDEX: 24.35 KG/M2 | SYSTOLIC BLOOD PRESSURE: 151 MMHG | WEIGHT: 160.69 LBS | HEIGHT: 68 IN | HEART RATE: 65 BPM

## 2023-10-24 DIAGNOSIS — M20.42 HAMMER TOES OF BOTH FEET: ICD-10-CM

## 2023-10-24 DIAGNOSIS — N18.30 STAGE 3 CHRONIC KIDNEY DISEASE, UNSPECIFIED WHETHER STAGE 3A OR 3B CKD: Primary | ICD-10-CM

## 2023-10-24 DIAGNOSIS — M79.671 RIGHT FOOT PAIN: ICD-10-CM

## 2023-10-24 DIAGNOSIS — G60.8 SENSORY PERIPHERAL NEUROPATHY: ICD-10-CM

## 2023-10-24 DIAGNOSIS — M20.41 HAMMER TOES OF BOTH FEET: ICD-10-CM

## 2023-10-24 PROCEDURE — 99214 PR OFFICE/OUTPT VISIT, EST, LEVL IV, 30-39 MIN: ICD-10-PCS | Mod: HCNC,S$GLB,, | Performed by: PODIATRIST

## 2023-10-24 PROCEDURE — 3077F SYST BP >= 140 MM HG: CPT | Mod: HCNC,CPTII,S$GLB, | Performed by: PODIATRIST

## 2023-10-24 PROCEDURE — 3078F DIAST BP <80 MM HG: CPT | Mod: HCNC,CPTII,S$GLB, | Performed by: PODIATRIST

## 2023-10-24 PROCEDURE — 3077F PR MOST RECENT SYSTOLIC BLOOD PRESSURE >= 140 MM HG: ICD-10-PCS | Mod: HCNC,CPTII,S$GLB, | Performed by: PODIATRIST

## 2023-10-24 PROCEDURE — 1157F ADVNC CARE PLAN IN RCRD: CPT | Mod: HCNC,CPTII,S$GLB, | Performed by: PODIATRIST

## 2023-10-24 PROCEDURE — 1157F PR ADVANCE CARE PLAN OR EQUIV PRESENT IN MEDICAL RECORD: ICD-10-PCS | Mod: HCNC,CPTII,S$GLB, | Performed by: PODIATRIST

## 2023-10-24 PROCEDURE — 99214 OFFICE O/P EST MOD 30 MIN: CPT | Mod: HCNC,S$GLB,, | Performed by: PODIATRIST

## 2023-10-24 PROCEDURE — 3078F PR MOST RECENT DIASTOLIC BLOOD PRESSURE < 80 MM HG: ICD-10-PCS | Mod: HCNC,CPTII,S$GLB, | Performed by: PODIATRIST

## 2023-10-24 PROCEDURE — 99999 PR PBB SHADOW E&M-EST. PATIENT-LVL III: CPT | Mod: PBBFAC,HCNC,, | Performed by: PODIATRIST

## 2023-10-24 PROCEDURE — 1159F MED LIST DOCD IN RCRD: CPT | Mod: HCNC,CPTII,S$GLB, | Performed by: PODIATRIST

## 2023-10-24 PROCEDURE — 1125F AMNT PAIN NOTED PAIN PRSNT: CPT | Mod: HCNC,CPTII,S$GLB, | Performed by: PODIATRIST

## 2023-10-24 PROCEDURE — 3288F PR FALLS RISK ASSESSMENT DOCUMENTED: ICD-10-PCS | Mod: HCNC,CPTII,S$GLB, | Performed by: PODIATRIST

## 2023-10-24 PROCEDURE — 99999 PR PBB SHADOW E&M-EST. PATIENT-LVL III: ICD-10-PCS | Mod: PBBFAC,HCNC,, | Performed by: PODIATRIST

## 2023-10-24 PROCEDURE — 4010F PR ACE/ARB THEARPY RXD/TAKEN: ICD-10-PCS | Mod: HCNC,CPTII,S$GLB, | Performed by: PODIATRIST

## 2023-10-24 PROCEDURE — 1100F PTFALLS ASSESS-DOCD GE2>/YR: CPT | Mod: HCNC,CPTII,S$GLB, | Performed by: PODIATRIST

## 2023-10-24 PROCEDURE — 1125F PR PAIN SEVERITY QUANTIFIED, PAIN PRESENT: ICD-10-PCS | Mod: HCNC,CPTII,S$GLB, | Performed by: PODIATRIST

## 2023-10-24 PROCEDURE — 1159F PR MEDICATION LIST DOCUMENTED IN MEDICAL RECORD: ICD-10-PCS | Mod: HCNC,CPTII,S$GLB, | Performed by: PODIATRIST

## 2023-10-24 PROCEDURE — 1100F PR PT FALLS ASSESS DOC 2+ FALLS/FALL W/INJURY/YR: ICD-10-PCS | Mod: HCNC,CPTII,S$GLB, | Performed by: PODIATRIST

## 2023-10-24 PROCEDURE — 3288F FALL RISK ASSESSMENT DOCD: CPT | Mod: HCNC,CPTII,S$GLB, | Performed by: PODIATRIST

## 2023-10-24 PROCEDURE — 4010F ACE/ARB THERAPY RXD/TAKEN: CPT | Mod: HCNC,CPTII,S$GLB, | Performed by: PODIATRIST

## 2023-10-24 NOTE — PROGRESS NOTES
Subjective:      Patient ID: Manisha Wynne is a 73 y.o. female.    Chief Complaint:   Hammer Toe (Consult per EM; right 3rd digit hurts the most)    Manisha is a 73 y.o. female who presents to the clinic for evaluation and treatment of feet  Manisha has a past medical history of Abnormal Pap smear, Atrial fibrillation, AV block, 1st degree (07/25/2012), C. difficile diarrhea, Cataract, Depression (07/24/2012), Facet arthritis of lumbar region (03/31/2015), Falls, Hyperlipidemia, Hypertension (07/24/2012), Neuropathy, Other specified cardiac dysrhythmias(427.89), Sleep apnea, Syncope (07/24/2012), and Tremors of nervous system.  .  This patient has documented high risk feet requiring routine maintenance secondary to peripheral neuropathy.     Pt relates she is better.. pain is about a 4.  Crest Pads have helped.      Ongoing issue with right 3rd toe pain secondary to hammertoe/contracture in lesion to the end of the digit.  Here for surgical evaluation.     PCP: Iris Blue MD    Date Last Seen by PCP: 7/6/23 heme onc    Current shoe gear:  Affected Foot : bacilio Snyder        Hemoglobin A1C   Date Value Ref Range Status   08/05/2022 5.3 4.0 - 5.6 % Final     Comment:     ADA Screening Guidelines:  5.7-6.4%  Consistent with prediabetes  >or=6.5%  Consistent with diabetes    High levels of fetal hemoglobin interfere with the HbA1C  assay. Heterozygous hemoglobin variants (HbS, HgC, etc)do  not significantly interfere with this assay.   However, presence of multiple variants may affect accuracy.     06/10/2021 5.4 4.0 - 5.6 % Final     Comment:     ADA Screening Guidelines:  5.7-6.4%  Consistent with prediabetes  >or=6.5%  Consistent with diabetes    High levels of fetal hemoglobin interfere with the HbA1C  assay. Heterozygous hemoglobin variants (HbS, HgC, etc)do  not significantly interfere with this assay.   However, presence of multiple variants may affect accuracy.     06/10/2021 5.4 4.0 - 5.6 %  Final     Comment:     ADA Screening Guidelines:  5.7-6.4%  Consistent with prediabetes  >or=6.5%  Consistent with diabetes    High levels of fetal hemoglobin interfere with the HbA1C  assay. Heterozygous hemoglobin variants (HbS, HgC, etc)do  not significantly interfere with this assay.   However, presence of multiple variants may affect accuracy.          Past Medical History:   Diagnosis Date    Abnormal Pap smear     Atrial fibrillation     AV block, 1st degree 07/25/2012    C. difficile diarrhea     RESOLVED    Cataract     Depression 07/24/2012    Facet arthritis of lumbar region 03/31/2015    Falls     3 falls in the last 6 mos--noted 6/19/19    Hyperlipidemia     Hypertension 07/24/2012    Neuropathy     Other specified cardiac dysrhythmias(427.89)     Sleep apnea     Syncope 07/24/2012    Tremors of nervous system     hands bilaterally     Past Surgical History:   Procedure Laterality Date    APPLICATION OF CARTILAGE GRAFT Bilateral 12/19/2019    Procedure: APPLICATION, CARTILAGE GRAFT AURICULAR JEZ;  Surgeon: Martinez Flores III, MD;  Location: [facility];  Service: ENT;  Laterality: Bilateral;    CARDIAC PACEMAKER PLACEMENT  09/07/2012    [id number]    CATARACT EXTRACTION W/  INTRAOCULAR LENS IMPLANT Right 5/16/2022    Procedure: EXTRACTION, CATARACT, WITH IOL INSERTION;  Surgeon: Ines Aguilera MD;  Location: [facility] OR;  Service: Ophthalmology;  Laterality: Right;  Catalys     CATARACT EXTRACTION W/  INTRAOCULAR LENS IMPLANT Left 6/20/2022    Procedure: EXTRACTION, CATARACT, WITH IOL INSERTION;  Surgeon: Ines Aguilera MD;  Location: [facility];  Service: Ophthalmology;  Laterality: Left;  Catalys     DILATION AND CURETTAGE OF UTERUS      Hx of precancerous cells?    ENDOSCOPIC ULTRASOUND OF UPPER GASTROINTESTINAL TRACT N/A 7/5/2019    Procedure: ULTRASOUND, UPPER GI TRACT, ENDOSCOPIC;  Surgeon: Kev Calderon MD;  [facility]  Service: Endoscopy;  Laterality: N/A;   Pacemaker-Adam   appt confirmed-rb    MYELOGRAPHY N/A 5/4/2021    Procedure: Myelogram  CERVICAL, THORACIC AND LUMBAR;  [facility];  Location: [facility] OR [address] [place] [address];  Service: Radiology;  Laterality: N/A;    NASAL SEPTOPLASTY N/A 12/19/2019    Procedure: SEPTOPLASTY, NOSE;  Surgeon: Martinez Flores III, MD;  Location: [facility] OR;  Service: ENT;  Laterality: N/A;  FOLLOW DR SALVATORE KIMBROUGH PROTOCOL    OPEN REDUCTION AND INTERNAL FIXATION (ORIF) OF FRACTURE OF DISTAL RADIUS Left 1/24/2020    Procedure: ORIF, FRACTURE, RADIUS, DISTAL-left;  Surgeon: Ellen Moe MD;  Location: [facility] OR;  Service: Orthopedics;  Laterality: Left;    POSTERIOR FUSION OF CERVICAL SPINE WITH LAMINECTOMY N/A 11/14/2022    Procedure: LAMINECTOMY, SPINE, CERVICAL, WITH POSTERIOR FUSION C3-C6;  Surgeon: Nicolette Cheek MD;  Location: [address];  Service: Neurosurgery;  Laterality: N/A;  [place] III, ASA III, BLOOD TYPE AND SCREEN, NEUROMONITORING EMG SEP MEP, BRACE/HALO [place], BED REGULAR BED, HEADREST [place], POSITION PRONE, SPECIAL EQUIPMENT NIKI MIDDLETON, RADIOLOGY C-ARM, EXT. BONE STIMULATOR BIOMET    REPLACEMENT OF PACEMAKER GENERATOR Left 10/7/2022    Procedure: REPLACEMENT, PACEMAKER GENERATOR;  Surgeon: John Sheets MD;  Location: [facility] EP LAB;  Service: Cardiology;  Laterality: Left;  YAS, SSS, AVB, Dual PPM Gen Change,SJM, MAC ,ND, 3 prep,** Adam dcPPM in situ**    SURGICAL REMOVAL OF NASAL TURBINATE Bilateral 12/19/2019    Procedure: EXCISION, NASAL TURBINATE;  Surgeon: Martinez Flores III, MD;  Location: [facility] OR;  Service: ENT;  Laterality: Bilateral;    TONSILLECTOMY      WISDOM TOOTH EXTRACTION       Current Outpatient Medications on File Prior to Visit   Medication Sig Dispense Refill    acetaminophen (TYLENOL) 500 MG tablet Take 2 tablets (1,000 mg total) by mouth every 8 (eight) hours. (Patient taking differently: Take 1,000 mg by mouth 3 (three) times daily as needed.)  0    cyanocobalamin  (VITAMIN B-12) 1000 MCG tablet Take 100 mcg by mouth once daily.      gabapentin (NEURONTIN) 100 MG capsule One capsule 2 (two) times daily. 180 capsule 3    lisdexamfetamine (VYVANSE) 70 MG capsule Take 1 capsule (70 mg total) by mouth every morning. 30 capsule 0    losartan (COZAAR) 25 MG tablet Take 1 tablet (25 mg total) by mouth once daily. 90 tablet 6    methyl salicylate-menthol 15-10% 15-10 % Crea Apply topically every evening. 113 g 0    pravastatin (PRAVACHOL) 20 MG tablet TAKE 1 TABLET EVERY DAY 90 tablet 3    PROLIA 60 mg/mL Syrg       propranoloL (INDERAL) 20 MG tablet Take 1 tablet (20 mg total) by mouth 2 (two) times daily. 180 tablet 3    buPROPion (WELLBUTRIN XL) 300 MG 24 hr tablet Take 1 tablet by mouth once daily.      calcium-vitamin D tablet 600 mg-200 units Take 1 tablet by mouth once daily. 30 tablet 0    FLUoxetine 40 MG capsule Take 1 capsule (40 mg total) by mouth once daily. 30 capsule 0     No current facility-administered medications on file prior to visit.     Review of patient's allergies indicates:  No Known Allergies    Review of Systems   Constitutional: Negative for chills, decreased appetite, fever, malaise/fatigue, night sweats, weight gain and weight loss.   Eyes:         Poor eyesight   Cardiovascular:  Negative for chest pain, claudication, dyspnea on exertion, leg swelling, palpitations and syncope.   Respiratory:  Negative for cough and shortness of breath.    Endocrine: Negative for cold intolerance and heat intolerance.   Hematologic/Lymphatic: Negative for bleeding problem. Does not bruise/bleed easily.   Skin:  Positive for nail changes. Negative for color change, dry skin, flushing, itching, poor wound healing, rash, skin cancer, suspicious lesions and unusual hair distribution.   Musculoskeletal:  Positive for arthritis and stiffness. Negative for back pain, falls, gout, joint pain, joint swelling, muscle cramps, muscle weakness, myalgias and neck pain.  "  Gastrointestinal:  Negative for diarrhea, nausea and vomiting.   Neurological:  Positive for numbness and paresthesias. Negative for dizziness, focal weakness, light-headedness, tremors, vertigo and weakness.   Psychiatric/Behavioral:  Negative for altered mental status and depression. The patient does not have insomnia.    Allergic/Immunologic: Negative.            Objective:       Vitals:    10/24/23 0955   BP: (!) 151/79   Pulse: 65   Weight: 72.9 kg (160 lb 11.5 oz)   Height: 5' 8" (1.727 m)   PainSc:   2   PainLoc: Foot   72.9 kg (160 lb 11.5 oz) afeb    Physical Exam  Vitals reviewed.   Constitutional:       General: She is not in acute distress.     Appearance: She is well-developed. She is not ill-appearing, toxic-appearing or diaphoretic.      Comments: Proper supportive shoegear   Cardiovascular:      Pulses:           Dorsalis pedis pulses are 2+ on the right side and 2+ on the left side.        Posterior tibial pulses are 1+ on the right side and 1+ on the left side.      Comments: No edema to digits  Musculoskeletal:         General: Deformity present.      Right lower leg: No edema.      Left lower leg: No edema.      Right foot: Decreased range of motion. Deformity, prominent metatarsal heads and tenderness present. No bony tenderness.      Left foot: Decreased range of motion. Deformity and prominent metatarsal heads present. No tenderness or bony tenderness.      Comments:    Semi- Reducible extensor and flexor contractures at the MTPJ and PIPJ of toes 2-5, bilat.      No other areas of pain    Positive pain with debridement of 3rd digit pre ulcerative callus right   Feet:      Right foot:      Protective Sensation: 10 sites tested.  0 sites sensed.      Skin integrity: Skin breakdown and callus present. No ulcer, blister, erythema, warmth or dry skin.      Toenail Condition: Right toenails are abnormally thick. Fungal disease present.     Left foot:      Protective Sensation: 10 sites tested.  0 " sites sensed.      Skin integrity: No ulcer, blister, skin breakdown, erythema, warmth, callus or dry skin.      Toenail Condition: Left toenails are abnormally thick. Fungal disease present.     Comments:  No soi    Positive hemosiderin /preulcer to distal 3rd digit right   No other areas of breakdown interspaces appear clear         Skin:     General: Skin is warm and dry.      Capillary Refill: Capillary refill takes 2 to 3 seconds.      Coloration: Skin is not pale.      Findings: No erythema or rash.      Nails: There is clubbing.      Comments:          Neurological:      Mental Status: She is alert and oriented to person, place, and time.      Sensory: Sensory deficit present.      Motor: Atrophy present.      Gait: Gait abnormal.   Psychiatric:         Attention and Perception: Attention normal.         Mood and Affect: Mood normal.         Speech: Speech normal.         Thought Content: Thought content normal.         Cognition and Memory: Cognition normal.         Judgment: Judgment normal.                    Assessment:       Encounter Diagnoses   Name Primary?    Stage 3 chronic kidney disease, unspecified whether stage 3a or 3b CKD Yes    Hammer toes of both feet     Sensory peripheral neuropathy     Right foot pain                  Plan:       Manisha was seen today for hammer toe.    Diagnoses and all orders for this visit:    Stage 3 chronic kidney disease, unspecified whether stage 3a or 3b CKD    Hammer toes of both feet    Sensory peripheral neuropathy    Right foot pain        I counseled the patient on her conditions, their implications and medical management.    Decision making:  Chronic illnesses discussed in detail, previous records/notes and imaging independently reviewed, prescription drug management performed in addition to lengthy discussion regarding both conservative and surgical treatment options.    The nature of the condition, options for management, as well as potential risks and  complications were discussed in detail with patient. Patient was amenable to my recommendations and left my office fully informed and will follow up as instructed or sooner if necessary.      Digital padding/conservative versus surgical options discussed in detail as well as appropriate shoe gear and orthotic therapy.    Patient will follow-up for possible right 3rd toe flexor tenotomy procedure.

## 2023-11-01 ENCOUNTER — TELEPHONE (OUTPATIENT)
Dept: PODIATRY | Facility: CLINIC | Age: 73
End: 2023-11-01
Payer: MEDICARE

## 2023-11-01 NOTE — TELEPHONE ENCOUNTER
Left  for pt with callback number of [phone] in regards to scheduled appt for tenotomy of right 3rd digit scheduled on 11/6/2023 at 10:45am. Sent pt portal message as well as requested.

## 2023-11-02 ENCOUNTER — HOSPITAL ENCOUNTER (EMERGENCY)
Facility: OTHER | Age: 73
Discharge: HOME | End: 2023-11-02
Attending: EMERGENCY MEDICINE
Payer: MEDICARE

## 2023-11-02 VITALS
OXYGEN SATURATION: 98 % | TEMPERATURE: 98 F | BODY MASS INDEX: 23.95 KG/M2 | HEART RATE: 60 BPM | DIASTOLIC BLOOD PRESSURE: 70 MMHG | RESPIRATION RATE: 18 BRPM | WEIGHT: 158 LBS | SYSTOLIC BLOOD PRESSURE: 159 MMHG | HEIGHT: 68 IN

## 2023-11-02 DIAGNOSIS — S01.81XA FOREHEAD LACERATION, INITIAL ENCOUNTER: Primary | ICD-10-CM
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
## 2023-11-02 PROCEDURE — 12011 RPR F/E/E/N/L/M 2.5 CM/<: CPT | Mod: HCNC

## 2023-11-02 PROCEDURE — 99283 EMERGENCY DEPT VISIT LOW MDM: CPT | Mod: 25,HCNC

## 2023-11-02 PROCEDURE — 25000003 PHARM REV CODE 250: Mod: HCNC | Performed by: EMERGENCY MEDICINE

## 2023-11-02 PROCEDURE — 25000003 PHARM REV CODE 250: Mod: HCNC | Performed by: NURSE PRACTITIONER

## 2023-11-02 RX ORDER — LIDOCAINE HYDROCHLORIDE 10 MG/ML
5 INJECTION INFILTRATION; PERINEURAL
Status: COMPLETED | OUTPATIENT
Start: 2023-11-02 | End: 2023-11-02

## 2023-11-02 RX ORDER — ACETAMINOPHEN 500 MG
1000 TABLET ORAL
Status: COMPLETED | OUTPATIENT
Start: 2023-11-02 | End: 2023-11-02

## 2023-11-02 RX ORDER — MUPIROCIN 20 MG/G
1 OINTMENT TOPICAL
Status: DISCONTINUED | OUTPATIENT
Start: 2023-11-02 | End: 2023-11-02

## 2023-11-02 RX ADMIN — LIDOCAINE HYDROCHLORIDE 5 ML: 10 INJECTION, SOLUTION INFILTRATION; PERINEURAL at 11:11

## 2023-11-02 RX ADMIN — Medication: at 11:11

## 2023-11-02 RX ADMIN — BACITRACIN ZINC, NEOMYCIN, POLYMYXIN B: 400; 3.5; 5 OINTMENT TOPICAL at 12:11

## 2023-11-02 RX ADMIN — ACETAMINOPHEN 1000 MG: 500 TABLET ORAL at 09:11

## 2023-11-02 NOTE — ED TRIAGE NOTES
Pt was rapid response after fall hitting head while clocking into shift prior to ER arrival. Pt endorses hx of falls. Pt has large lac to L side of head with a large amount of blood noted. Denies LOC. Reports taking 81mg ASA daily. Pt denies chest pain, SOB, extremity pain, and neck pain. Pt reporting 10/10 HA, but denies vision changes.

## 2023-11-02 NOTE — ED PROVIDER NOTES
Encounter Date: 11/2/2023    SCRIBE #1 NOTE: I, Skylar Cantrell, daniel scribing for, and in the presence of,  Ministerio Truong MD. I have scribed the following portions of the note - Other sections scribed: HPI, ROS, PE.       History     Chief Complaint   Patient presents with    Fall     Pt was rapid response after fall hitting head while clocking into shift prior to ER arrival. Pt has large lac to L side of head with a large amount of blood noted. Denies LOC. Reports taking 81mg ASA daily.      Time seen by provider: 9:19 AM    This is a 73 y.o. female with HTN, pacemaker in place, and chronic neck pain who presents with complaint of headaches following a mechanical fall with resulting head injury this morning. Patient works here as a  and was walking on the second floor when she tripped over her shoe, landing face-first with a resulting laceration but without loss of consciousness. She does not recall any preceding dizziness, lightheadedness, chest pain, or shortness of breath. Patient was able to get up with minimal assistance and denies any neck pain. She is not on blood thinners. This is the extent of the patient's complaints at this time.    The history is provided by the patient.     Review of patient's allergies indicates:  No Known Allergies  Past Medical History:   Diagnosis Date    Abnormal Pap smear     Atrial fibrillation     AV block, 1st degree 07/25/2012    C. difficile diarrhea     RESOLVED    Cataract     Depression 07/24/2012    Facet arthritis of lumbar region 03/31/2015    Falls     3 falls in the last 6 mos--noted 6/19/19    Hyperlipidemia     Hypertension 07/24/2012    Neuropathy     Other specified cardiac dysrhythmias(427.89)     Sleep apnea     Syncope 07/24/2012    Tremors of nervous system     hands bilaterally     Past Surgical History:   Procedure Laterality Date    APPLICATION OF CARTILAGE GRAFT Bilateral 12/19/2019    Procedure: APPLICATION, CARTILAGE GRAFT AURICULAR  JEZ;  Surgeon: Martinez Flores III, MD;  Location: [facility];  Service: ENT;  Laterality: Bilateral;    CARDIAC PACEMAKER PLACEMENT  09/07/2012    [id number]    CATARACT EXTRACTION W/  INTRAOCULAR LENS IMPLANT Right 5/16/2022    Procedure: EXTRACTION, CATARACT, WITH IOL INSERTION;  Surgeon: Ines Aguilera MD;  Location: [facility];  Service: Ophthalmology;  Laterality: Right;  Catalys     CATARACT EXTRACTION W/  INTRAOCULAR LENS IMPLANT Left 6/20/2022    Procedure: EXTRACTION, CATARACT, WITH IOL INSERTION;  Surgeon: Ines Aguilera MD;  Location: [facility];  Service: Ophthalmology;  Laterality: Left;  Catalys     DILATION AND CURETTAGE OF UTERUS      Hx of precancerous cells?    ENDOSCOPIC ULTRASOUND OF UPPER GASTROINTESTINAL TRACT N/A 7/5/2019    Procedure: ULTRASOUND, UPPER GI TRACT, ENDOSCOPIC;  Surgeon: Kev Calderon MD;  Location: [facility] (2ND FLR);  Service: Endoscopy;  Laterality: N/A;  Pacemaker-Adam   appt confirmed-rb    MYELOGRAPHY N/A 5/4/2021    Procedure: Myelogram  CERVICAL, THORACIC AND LUMBAR;  Surgeon: [facility] Diagnostic Provider;  Location: [facility] 2ND FLR;  Service: Radiology;  Laterality: N/A;    NASAL SEPTOPLASTY N/A 12/19/2019    Procedure: SEPTOPLASTY, NOSE;  Surgeon: Martinez Flores III, MD;  Location: [facility];  Service: ENT;  Laterality: N/A;  FOLLOW DR SALVATORE KIMBROUGH PROTOCOL    OPEN REDUCTION AND INTERNAL FIXATION (ORIF) OF FRACTURE OF DISTAL RADIUS Left 1/24/2020    Procedure: ORIF, FRACTURE, RADIUS, DISTAL-left;  Surgeon: Ellen Moe MD;  Location: [facility];  Service: Orthopedics;  Laterality: Left;    POSTERIOR FUSION OF CERVICAL SPINE WITH LAMINECTOMY N/A 11/14/2022    Procedure: LAMINECTOMY, SPINE, CERVICAL, WITH POSTERIOR FUSION C3-C6;  Surgeon: Nicolette Cheek MD;  Location: [facility] 2ND FLR;  Service: Neurosurgery;  Laterality: N/A;  TORONTO III, ASA III, BLOOD TYPE AND SCREEN, NEUROMONITORING EMG SEP MEP, BRACE/HALO [place], BED REGULAR BED, HEADREST [place],  POSITION PRONE, SPECIAL EQUIPMENT NIKI MIDDLETON, RADIOLOGY C-ARM, EXT. BONE STIMULATOR BIOMET    REPLACEMENT OF PACEMAKER GENERATOR Left 10/7/2022    Procedure: REPLACEMENT, PACEMAKER GENERATOR;  Surgeon: John Sheets MD;  Location: [facility];  Service: Cardiology;  Laterality: Left;  YAS, SSS, AVB, Dual PPM Gen Change,SJM, MAC ,HI, 3 prep,** Adam dcPPM in situ**    SURGICAL REMOVAL OF NASAL TURBINATE Bilateral 2019    Procedure: EXCISION, NASAL TURBINATE;  Surgeon: Martinez Flores III, MD;  Location: [facility] OR;  Service: ENT;  Laterality: Bilateral;    TONSILLECTOMY      WISDOM TOOTH EXTRACTION       Family History   Adopted: Yes   Problem Relation Age of Onset    Amblyopia Neg Hx     Blindness Neg Hx     Cataracts Neg Hx     Glaucoma Neg Hx     Macular degeneration Neg Hx     Retinal detachment Neg Hx      Social History     Tobacco Use    Smoking status: Former     Current packs/day: 0.00     Average packs/day: 0.3 packs/day for 15.0 years (3.8 ttl pk-yrs)     Types: Cigarettes     Start date: 1967     Quit date: 1982     Years since quittin.3    Smokeless tobacco: Former   Substance Use Topics    Alcohol use: Yes     Comment: no daily or heavy use, ~4 times per month    Drug use: No     Review of Systems   Constitutional:  Negative for fever.   HENT:  Negative for congestion.    Eyes:  Negative for redness.   Respiratory:  Negative for shortness of breath.    Cardiovascular:  Negative for chest pain.   Gastrointestinal:  Negative for abdominal pain.   Genitourinary:  Negative for dysuria.   Skin:  Positive for wound. Negative for rash.   Neurological:  Positive for headaches.   Psychiatric/Behavioral:  Negative for confusion.        Physical Exam     Initial Vitals   BP Pulse Resp Temp SpO2   23 0917 23 0917 23 0917 23 0926 23 0917   (!) 155/69 60 18 97.6 °F (36.4 °C) 100 %      MAP       --                Physical Exam    Constitutional: She  appears well-developed and well-nourished. She is not diaphoretic. No distress.   HENT:   Head: Normocephalic.   2 cm left temporal forehead laceration. No active bleeding or significant bony facial tenderness.   Eyes: Conjunctivae and EOM are normal. Pupils are equal, round, and reactive to light.   Neck: Neck supple.   Normal range of motion.  Cardiovascular:  Normal rate, regular rhythm, S1 normal, S2 normal, normal heart sounds and intact distal pulses.           No murmur heard.  Pulmonary/Chest: Breath sounds normal. No respiratory distress. She has no wheezes. She has no rhonchi. She has no rales.   Abdominal: Abdomen is soft. There is no abdominal tenderness. There is no rebound and no guarding.   Musculoskeletal:         General: No tenderness or edema.      Cervical back: Normal range of motion and neck supple.     Neurological: She is alert and oriented to person, place, and time. She has normal strength. No cranial nerve deficit.   Skin: Skin is warm and dry.   Psychiatric: She has a normal mood and affect.         ED Course   Lac Repair    Date/Time: 11/2/2023 12:14 PM    Performed by: Geetha Hernandez NP  Authorized by: Ministerio Truong MD    Consent:     Consent obtained:  Verbal    Consent given by:  Patient    Risks, benefits, and alternatives were discussed: yes    Universal protocol:     Patient identity confirmed:  Verbally with patient  Anesthesia:     Anesthesia method:  Topical application    Topical anesthetic:  LET  Laceration details:     Location:  Face    Face location:  Forehead    Length (cm):  2  Pre-procedure details:     Preparation:  Patient was prepped and draped in usual sterile fashion and imaging obtained to evaluate for foreign bodies  Exploration:     Hemostasis achieved with:  LET  Treatment:     Area cleansed with:  Povidone-iodine    Amount of cleaning:  Extensive    Irrigation solution:  Sterile saline    Irrigation method:  Syringe  Skin repair:     Repair method:   Sutures    Suture size:  4-0    Wound skin closure material used: Ethilon.    Suture technique:  Simple interrupted    Number of sutures:  4  Approximation:     Approximation:  Close  Repair type:     Repair type:  Simple  Post-procedure details:     Procedure completion:  Tolerated well, no immediate complications    Labs Reviewed - No data to display       Imaging Results              CT Cervical Spine Without Contrast (Final result)  Result time 11/02/23 10:23:18      Final result by Doc Pruitt MD (11/02/23 10:23:18)                   Impression:      1. No acute cervical spine fracture.  2. Postoperative and degenerative findings in the cervical spine, as discussed.  3. Unchanged appearance of the hardware when compared to 05/04/2023 CT.  As before, right C3 lateral mass screw tip appears to protrude into the lateral aspect of the spinal canal and C6 left lateral screw appears to protrude into the posterior aspect of the left C6 transverse foramen.      Electronically signed by: Doc Pruitt  Date:    11/02/2023  Time:    10:23               Narrative:    EXAMINATION:  CT CERVICAL SPINE WITHOUT CONTRAST    CLINICAL HISTORY:  Neck trauma (Age >= 65y);    TECHNIQUE:  Low dose axial images, sagittal and coronal reformations were performed though the cervical spine.  Contrast was not administered.    COMPARISON:  CT cervical spine 05/04/2023.    FINDINGS:  Prior surgery: Redemonstrated sequela of C3-C6 posterior instrumented fusion and laminectomy.  Morselized bone graft material is noted in the posterolateral gutters.  In compared to prior CT of 05/04/2023, no definite change in appearance of the hardware.  As before, the right C3 lateral mass screw tip appears to protrude into the lateral aspect of the spinal canal.  The tip of the left C6 lateral mass screw again appears to protrude into the posterior aspect of the left C6 transverse foramen.    Fractures: No acute fractures    Alignment: There is no  significant vertebral subluxation  Atlanto-axial and atlanto-occipital joints: Atlanto-axial and atlanto-occipital intervals are not widened.  Facet joints: There is no traumatic facet joint widening.  Vertebral bodies: Query osseous demineralization.  Multilevel degenerative plate change.  Discs: Degenerative disc osteophyte complex formation.  Spinal canal and foraminal narrowing: Although CT does not optimally evaluate the soft tissue contents of the spinal canal and foramina, no critical stenosis is suggested.    At C4-5, moderate to severe left foraminal narrowing.    At C6-7, moderate to severe left foraminal narrowing.  Paraspinal soft tissues: Unremarkable.    Upper Lungs:No acute change.  Biapical pleuroparenchymal scarring.  Mosaic-type attenuation may relate to small vessel and/or small airways disease.                                       CT Head Without Contrast (Final result)  Result time 11/02/23 10:07:55      Final result by Selam Dowd MD (11/02/23 10:07:55)                   Impression:      No evidence of acute intracranial pathology.    Mild soft tissue edema left frontal region.  No radiopaque foreign body seen.      Electronically signed by: Selam Dowd MD  Date:    11/02/2023  Time:    10:07               Narrative:    EXAMINATION:  CT HEAD WITHOUT CONTRAST    CLINICAL HISTORY:  Head trauma, minor (Age >= 65y);    TECHNIQUE:  Low dose axial CT images obtained throughout the head without the use of intravenous contrast.  Axial, sagittal and coronal reconstructions were performed.    COMPARISON:  09/18/2022 head CT    FINDINGS:  Intracranial compartment:    Ventricles and sulci are normal in size for age without evidence of hydrocephalus.    The brain parenchyma appears within normal limits.  No parenchymal  hemorrhage, edema, mass effect or major vascular distribution infarct.    No extra-axial blood or fluid collections.    Skull/extracranial contents (limited  evaluation):    No displaced calvarial fracture.  No pneumocephalus.  Chronic nasal bone deformity.    The visualized mastoid air cells are well aerated. The  visualized paranasal sinuses are essentially clear.  There is mild soft tissue edema/swelling left frontal region.  No radiopaque foreign body seen.                                       Medications   acetaminophen tablet 1,000 mg (1,000 mg Oral Given 11/2/23 6065)   LIDOcaine HCL 10 mg/ml (1%) injection 5 mL (5 mLs Infiltration Given by Other 11/2/23 8730)   LETS (LIDOcaine-TETRAcaine-EPINEPHrine) gel solution ( Topical (Top) Given 11/2/23 1147)   neomycin-bacitracin-polymyxin ointment ( Topical (Top) Given 11/2/23 1230)     Medical Decision Making      73-year-old female with history of HTN, pacemaker, chronic neck pain presents for evaluation s/p mechanical fall.  Patient works here as a , was walking up stairs when she tripped and fell forward, resulting in left temporal forehead laceration.  She denies any LOC, was able to get up by herself after fall, no other injuries or complaints.  She reports mild headache to area of laceration currently; no vision changes, facial pain, worsening neck pain, nausea vomiting, or other injuries.  She is on no anticoagulation.  She is otherwise at normal baseline earlier today, no recent illness.  On exam patient with 2.5 cm superficial left temporal scalp laceration with no active bleeding, no significant associated facial bone tenderness.  She has no C-spine tenderness or other concerning signs of injury.  Given age and mechanism of injury, will get CT head to rule out intracranial injury, as well as CT neck given mechanism and history of C-spine surgery 1 year ago.  Since she was otherwise well before fall and no presyncopal symptoms, no indication for labs or further emergent workup at this time.    CT head shows no acute intracranial injury, only mild soft tissue edema of left frontal area that corresponds  to her laceration and associated soft tissue contusion.  CT neck with postsurgical changes with no acute injury.  Laceration was repaired with NP Uphold without complication.  Patient advised on further wound care and suture removal within 5-7 days, and return precautions for any signs of wound infection, and head trauma precautions as well.  She was ambulatory on discharge with stable vitals and comfortable with discharge plan.        Amount and/or Complexity of Data Reviewed  Radiology: ordered.    Risk  OTC drugs.            Scribe Attestation:   Scribe #1: I performed the above scribed service and the documentation accurately describes the services I performed. I attest to the accuracy of the note.    I, Dr. Ministerio Truong, personally performed the services described in this documentation. All medical record entries made by the scribe were at my direction and in my presence.  I have reviewed the chart and agree that the record reflects my personal performance and is accurate and complete. Ministerio Truong MD.                           Clinical Impression:   Final diagnoses:  [S01.81XA] Forehead laceration, initial encounter (Primary)  [W19.XXXA] Fall, initial encounter        ED Disposition Condition    Discharge Stable          ED Prescriptions    None       Follow-up Information       Follow up With Specialties Details Why Contact Info    [facility] - Emergency Dept Emergency Medicine Go in 6 days For suture removal [id number] [id number] [phone]             Ministerio Truong MD  11/02/23 [id number]

## 2023-11-04 RX ORDER — MUPIROCIN 20 MG/G
OINTMENT TOPICAL 3 TIMES DAILY
Qty: 2 G | Refills: 0 | Status: SHIPPED | OUTPATIENT
Start: 2023-11-04 | End: 2024-02-23

## 2023-11-09 ENCOUNTER — TELEPHONE (OUTPATIENT)
Dept: ELECTROPHYSIOLOGY | Facility: CLINIC | Age: 73
End: 2023-11-09
Payer: MEDICARE

## 2023-11-09 NOTE — TELEPHONE ENCOUNTER
New onset atrial fibrillation noted during device interrogation/device remote check.      Overall burden:  <1%    Max duration seen: 1 hr 6 minutes on 23    Most recent episode: 10/15/2023 for 10 secs    Ventricular rates:   Controlled       Anticoagulation status:  None, possible fall risk    Patient symptoms:Patient stated she does not remember feeling any symptoms but it could have been a night that a baby  or there were multiple deaths.  She is a hospital monique.      Pt takinig Propranolol 20 mg BID    Bp 158/70 23

## 2023-11-13 ENCOUNTER — OFFICE VISIT (OUTPATIENT)
Dept: INTERNAL MEDICINE | Facility: CLINIC | Age: 73
End: 2023-11-13
Payer: MEDICARE

## 2023-11-13 VITALS
HEART RATE: 62 BPM | BODY MASS INDEX: 24.17 KG/M2 | WEIGHT: 159.5 LBS | OXYGEN SATURATION: 97 % | DIASTOLIC BLOOD PRESSURE: 62 MMHG | SYSTOLIC BLOOD PRESSURE: 122 MMHG | HEIGHT: 68 IN

## 2023-11-13 DIAGNOSIS — R35.0 URINARY FREQUENCY: ICD-10-CM

## 2023-11-13 DIAGNOSIS — Z12.31 SCREENING MAMMOGRAM, ENCOUNTER FOR: ICD-10-CM

## 2023-11-13 DIAGNOSIS — R73.9 HYPERGLYCEMIA: ICD-10-CM

## 2023-11-13 DIAGNOSIS — R29.6 RECURRENT FALLS: ICD-10-CM

## 2023-11-13 DIAGNOSIS — E53.8 B12 DEFICIENCY: ICD-10-CM

## 2023-11-13 DIAGNOSIS — E78.5 HYPERLIPIDEMIA, UNSPECIFIED HYPERLIPIDEMIA TYPE: ICD-10-CM

## 2023-11-13 DIAGNOSIS — M81.8 OTHER OSTEOPOROSIS WITHOUT CURRENT PATHOLOGICAL FRACTURE: ICD-10-CM

## 2023-11-13 DIAGNOSIS — I10 PRIMARY HYPERTENSION: Primary | Chronic | ICD-10-CM

## 2023-11-13 PROCEDURE — G0009 PNEUMOCOCCAL CONJUGATE VACCINE 20-VALENT: ICD-10-PCS | Mod: HCNC,S$GLB,, | Performed by: INTERNAL MEDICINE

## 2023-11-13 PROCEDURE — 1100F PR PT FALLS ASSESS DOC 2+ FALLS/FALL W/INJURY/YR: ICD-10-PCS | Mod: HCNC,CPTII,S$GLB, | Performed by: INTERNAL MEDICINE

## 2023-11-13 PROCEDURE — G0009 ADMIN PNEUMOCOCCAL VACCINE: HCPCS | Mod: HCNC,S$GLB,, | Performed by: INTERNAL MEDICINE

## 2023-11-13 PROCEDURE — 4010F PR ACE/ARB THEARPY RXD/TAKEN: ICD-10-PCS | Mod: HCNC,CPTII,S$GLB, | Performed by: INTERNAL MEDICINE

## 2023-11-13 PROCEDURE — 3074F PR MOST RECENT SYSTOLIC BLOOD PRESSURE < 130 MM HG: ICD-10-PCS | Mod: HCNC,CPTII,S$GLB, | Performed by: INTERNAL MEDICINE

## 2023-11-13 PROCEDURE — 1126F PR PAIN SEVERITY QUANTIFIED, NO PAIN PRESENT: ICD-10-PCS | Mod: HCNC,CPTII,S$GLB, | Performed by: INTERNAL MEDICINE

## 2023-11-13 PROCEDURE — 3074F SYST BP LT 130 MM HG: CPT | Mod: HCNC,CPTII,S$GLB, | Performed by: INTERNAL MEDICINE

## 2023-11-13 PROCEDURE — 3288F PR FALLS RISK ASSESSMENT DOCUMENTED: ICD-10-PCS | Mod: HCNC,CPTII,S$GLB, | Performed by: INTERNAL MEDICINE

## 2023-11-13 PROCEDURE — 3078F PR MOST RECENT DIASTOLIC BLOOD PRESSURE < 80 MM HG: ICD-10-PCS | Mod: HCNC,CPTII,S$GLB, | Performed by: INTERNAL MEDICINE

## 2023-11-13 PROCEDURE — 90677 PNEUMOCOCCAL CONJUGATE VACCINE 20-VALENT: ICD-10-PCS | Mod: HCNC,S$GLB,, | Performed by: INTERNAL MEDICINE

## 2023-11-13 PROCEDURE — 99999 PR PBB SHADOW E&M-EST. PATIENT-LVL V: CPT | Mod: PBBFAC,HCNC,, | Performed by: INTERNAL MEDICINE

## 2023-11-13 PROCEDURE — 4010F ACE/ARB THERAPY RXD/TAKEN: CPT | Mod: HCNC,CPTII,S$GLB, | Performed by: INTERNAL MEDICINE

## 2023-11-13 PROCEDURE — 3044F PR MOST RECENT HEMOGLOBIN A1C LEVEL <7.0%: ICD-10-PCS | Mod: HCNC,CPTII,S$GLB, | Performed by: INTERNAL MEDICINE

## 2023-11-13 PROCEDURE — 3061F PR NEG MICROALBUMINURIA RESULT DOCUMENTED/REVIEW: ICD-10-PCS | Mod: HCNC,CPTII,S$GLB, | Performed by: INTERNAL MEDICINE

## 2023-11-13 PROCEDURE — 3288F FALL RISK ASSESSMENT DOCD: CPT | Mod: HCNC,CPTII,S$GLB, | Performed by: INTERNAL MEDICINE

## 2023-11-13 PROCEDURE — 3078F DIAST BP <80 MM HG: CPT | Mod: HCNC,CPTII,S$GLB, | Performed by: INTERNAL MEDICINE

## 2023-11-13 PROCEDURE — 1157F ADVNC CARE PLAN IN RCRD: CPT | Mod: HCNC,CPTII,S$GLB, | Performed by: INTERNAL MEDICINE

## 2023-11-13 PROCEDURE — 1100F PTFALLS ASSESS-DOCD GE2>/YR: CPT | Mod: HCNC,CPTII,S$GLB, | Performed by: INTERNAL MEDICINE

## 2023-11-13 PROCEDURE — 90677 PCV20 VACCINE IM: CPT | Mod: HCNC,S$GLB,, | Performed by: INTERNAL MEDICINE

## 2023-11-13 PROCEDURE — 99214 PR OFFICE/OUTPT VISIT, EST, LEVL IV, 30-39 MIN: ICD-10-PCS | Mod: HCNC,S$GLB,, | Performed by: INTERNAL MEDICINE

## 2023-11-13 PROCEDURE — 99999 PR PBB SHADOW E&M-EST. PATIENT-LVL V: ICD-10-PCS | Mod: PBBFAC,HCNC,, | Performed by: INTERNAL MEDICINE

## 2023-11-13 PROCEDURE — 1126F AMNT PAIN NOTED NONE PRSNT: CPT | Mod: HCNC,CPTII,S$GLB, | Performed by: INTERNAL MEDICINE

## 2023-11-13 PROCEDURE — 99214 OFFICE O/P EST MOD 30 MIN: CPT | Mod: HCNC,S$GLB,, | Performed by: INTERNAL MEDICINE

## 2023-11-13 PROCEDURE — 1157F PR ADVANCE CARE PLAN OR EQUIV PRESENT IN MEDICAL RECORD: ICD-10-PCS | Mod: HCNC,CPTII,S$GLB, | Performed by: INTERNAL MEDICINE

## 2023-11-13 PROCEDURE — 3044F HG A1C LEVEL LT 7.0%: CPT | Mod: HCNC,CPTII,S$GLB, | Performed by: INTERNAL MEDICINE

## 2023-11-13 PROCEDURE — 3066F NEPHROPATHY DOC TX: CPT | Mod: HCNC,CPTII,S$GLB, | Performed by: INTERNAL MEDICINE

## 2023-11-13 PROCEDURE — 3066F PR DOCUMENTATION OF TREATMENT FOR NEPHROPATHY: ICD-10-PCS | Mod: HCNC,CPTII,S$GLB, | Performed by: INTERNAL MEDICINE

## 2023-11-13 PROCEDURE — 3061F NEG MICROALBUMINURIA REV: CPT | Mod: HCNC,CPTII,S$GLB, | Performed by: INTERNAL MEDICINE

## 2023-11-14 ENCOUNTER — PATIENT MESSAGE (OUTPATIENT)
Dept: ADMINISTRATIVE | Facility: OTHER | Age: 73
End: 2023-11-14
Payer: MEDICARE

## 2023-11-14 ENCOUNTER — PATIENT MESSAGE (OUTPATIENT)
Dept: INTERNAL MEDICINE | Facility: CLINIC | Age: 73
End: 2023-11-14
Payer: MEDICARE

## 2023-11-16 ENCOUNTER — PROCEDURE VISIT (OUTPATIENT)
Dept: PODIATRY | Facility: CLINIC | Age: 73
End: 2023-11-16
Payer: MEDICARE

## 2023-11-16 ENCOUNTER — LAB VISIT (OUTPATIENT)
Dept: LAB | Facility: HOSPITAL | Age: 73
End: 2023-11-16
Attending: INTERNAL MEDICINE
Payer: MEDICARE

## 2023-11-16 VITALS
WEIGHT: 160.94 LBS | BODY MASS INDEX: 24.39 KG/M2 | HEIGHT: 68 IN | HEART RATE: 60 BPM | DIASTOLIC BLOOD PRESSURE: 78 MMHG | SYSTOLIC BLOOD PRESSURE: 149 MMHG

## 2023-11-16 DIAGNOSIS — E78.5 HYPERLIPIDEMIA, UNSPECIFIED HYPERLIPIDEMIA TYPE: ICD-10-CM

## 2023-11-16 DIAGNOSIS — M20.42 HAMMER TOES OF BOTH FEET: Primary | ICD-10-CM

## 2023-11-16 DIAGNOSIS — I10 PRIMARY HYPERTENSION: Chronic | ICD-10-CM

## 2023-11-16 DIAGNOSIS — M20.41 HAMMER TOES OF BOTH FEET: Primary | ICD-10-CM

## 2023-11-16 DIAGNOSIS — M81.8 OTHER OSTEOPOROSIS WITHOUT CURRENT PATHOLOGICAL FRACTURE: ICD-10-CM

## 2023-11-16 DIAGNOSIS — E53.8 B12 DEFICIENCY: ICD-10-CM

## 2023-11-16 DIAGNOSIS — R73.9 HYPERGLYCEMIA: ICD-10-CM

## 2023-11-16 LAB
25(OH)D3+25(OH)D2 SERPL-MCNC: 44 NG/ML (ref 30–96)
ALBUMIN SERPL BCP-MCNC: 3.9 G/DL (ref 3.5–5.2)
ALP SERPL-CCNC: 56 U/L (ref 55–135)
ALT SERPL W/O P-5'-P-CCNC: 21 U/L (ref 10–44)
ANION GAP SERPL CALC-SCNC: 10 MMOL/L (ref 8–16)
AST SERPL-CCNC: 20 U/L (ref 10–40)
BASOPHILS # BLD AUTO: 0.04 K/UL (ref 0–0.2)
BASOPHILS NFR BLD: 0.5 % (ref 0–1.9)
BILIRUB SERPL-MCNC: 0.7 MG/DL (ref 0.1–1)
BUN SERPL-MCNC: 20 MG/DL (ref 8–23)
CALCIUM SERPL-MCNC: 10 MG/DL (ref 8.7–10.5)
CHLORIDE SERPL-SCNC: 108 MMOL/L (ref 95–110)
CHOLEST SERPL-MCNC: 216 MG/DL (ref 120–199)
CHOLEST/HDLC SERPL: 2.8 {RATIO} (ref 2–5)
CO2 SERPL-SCNC: 25 MMOL/L (ref 23–29)
CREAT SERPL-MCNC: 1.1 MG/DL (ref 0.5–1.4)
DIFFERENTIAL METHOD: ABNORMAL
EOSINOPHIL # BLD AUTO: 0.3 K/UL (ref 0–0.5)
EOSINOPHIL NFR BLD: 4.5 % (ref 0–8)
ERYTHROCYTE [DISTWIDTH] IN BLOOD BY AUTOMATED COUNT: 13.5 % (ref 11.5–14.5)
EST. GFR  (NO RACE VARIABLE): 52.4 ML/MIN/1.73 M^2
ESTIMATED AVG GLUCOSE: 111 MG/DL (ref 68–131)
GLUCOSE SERPL-MCNC: 93 MG/DL (ref 70–110)
HBA1C MFR BLD: 5.5 % (ref 4–5.6)
HCT VFR BLD AUTO: 36.6 % (ref 37–48.5)
HDLC SERPL-MCNC: 77 MG/DL (ref 40–75)
HDLC SERPL: 35.6 % (ref 20–50)
HGB BLD-MCNC: 12.1 G/DL (ref 12–16)
IMM GRANULOCYTES # BLD AUTO: 0.04 K/UL (ref 0–0.04)
IMM GRANULOCYTES NFR BLD AUTO: 0.5 % (ref 0–0.5)
LDLC SERPL CALC-MCNC: 123 MG/DL (ref 63–159)
LYMPHOCYTES # BLD AUTO: 2.2 K/UL (ref 1–4.8)
LYMPHOCYTES NFR BLD: 28.4 % (ref 18–48)
MCH RBC QN AUTO: 30 PG (ref 27–31)
MCHC RBC AUTO-ENTMCNC: 33.1 G/DL (ref 32–36)
MCV RBC AUTO: 91 FL (ref 82–98)
MONOCYTES # BLD AUTO: 0.9 K/UL (ref 0.3–1)
MONOCYTES NFR BLD: 11.4 % (ref 4–15)
NEUTROPHILS # BLD AUTO: 4.2 K/UL (ref 1.8–7.7)
NEUTROPHILS NFR BLD: 54.7 % (ref 38–73)
NONHDLC SERPL-MCNC: 139 MG/DL
NRBC BLD-RTO: 0 /100 WBC
PLATELET # BLD AUTO: 190 K/UL (ref 150–450)
PMV BLD AUTO: 10.6 FL (ref 9.2–12.9)
POTASSIUM SERPL-SCNC: 4.7 MMOL/L (ref 3.5–5.1)
PROT SERPL-MCNC: 7.2 G/DL (ref 6–8.4)
RBC # BLD AUTO: 4.04 M/UL (ref 4–5.4)
SODIUM SERPL-SCNC: 143 MMOL/L (ref 136–145)
TRIGL SERPL-MCNC: 80 MG/DL (ref 30–150)
TSH SERPL DL<=0.005 MIU/L-ACNC: 3.05 UIU/ML (ref 0.4–4)
VIT B12 SERPL-MCNC: >2000 PG/ML (ref 210–950)
WBC # BLD AUTO: 7.61 K/UL (ref 3.9–12.7)

## 2023-11-16 PROCEDURE — 28232 PR INCISION FLEX TOE TENDON: ICD-10-PCS | Mod: T6,HCNC,S$GLB, | Performed by: PODIATRIST

## 2023-11-16 PROCEDURE — 82607 VITAMIN B-12: CPT | Mod: HCNC | Performed by: INTERNAL MEDICINE

## 2023-11-16 PROCEDURE — 80053 COMPREHEN METABOLIC PANEL: CPT | Mod: HCNC | Performed by: INTERNAL MEDICINE

## 2023-11-16 PROCEDURE — 82306 VITAMIN D 25 HYDROXY: CPT | Mod: HCNC | Performed by: INTERNAL MEDICINE

## 2023-11-16 PROCEDURE — 83036 HEMOGLOBIN GLYCOSYLATED A1C: CPT | Mod: HCNC | Performed by: INTERNAL MEDICINE

## 2023-11-16 PROCEDURE — 80061 LIPID PANEL: CPT | Mod: HCNC | Performed by: INTERNAL MEDICINE

## 2023-11-16 PROCEDURE — 83921 ORGANIC ACID SINGLE QUANT: CPT | Mod: HCNC | Performed by: INTERNAL MEDICINE

## 2023-11-16 PROCEDURE — 85025 COMPLETE CBC W/AUTO DIFF WBC: CPT | Mod: HCNC | Performed by: INTERNAL MEDICINE

## 2023-11-16 PROCEDURE — 28232 INCISION OF TOE TENDON: CPT | Mod: T6,HCNC,S$GLB, | Performed by: PODIATRIST

## 2023-11-16 PROCEDURE — 84443 ASSAY THYROID STIM HORMONE: CPT | Mod: HCNC | Performed by: INTERNAL MEDICINE

## 2023-11-16 RX ORDER — ASPIRIN 81 MG/1
81 TABLET ORAL DAILY
Status: ON HOLD | COMMUNITY
Start: 2006-06-06 | End: 2024-02-25 | Stop reason: HOSPADM

## 2023-11-17 ENCOUNTER — TELEPHONE (OUTPATIENT)
Dept: PODIATRY | Facility: CLINIC | Age: 73
End: 2023-11-17
Payer: MEDICARE

## 2023-11-17 ENCOUNTER — TELEPHONE (OUTPATIENT)
Dept: NEUROLOGY | Facility: CLINIC | Age: 73
End: 2023-11-17
Payer: MEDICARE

## 2023-11-17 NOTE — TELEPHONE ENCOUNTER
----- Message from Linda Tomlinson sent at 11/13/2023  3:42 PM CST -----  Dr Blue placed a referral for a Consult to Neurology. Please assist with scheduling.  Contact # [phone]      Diagnosis:Recurrent falls [R29.6]      Thanks  Linda ERAZO

## 2023-11-21 LAB — METHYLMALONATE SERPL-SCNC: 0.13 UMOL/L

## 2023-11-23 NOTE — PROCEDURES
Procedure:  Flexor tenotomy right 2nd digit  Performed by:  Ranjan Ho DPM  Preoperative diagnosis:  Hammertoe right 2nd digit      Attention was directed to the digit where 4cc of 1% lidocaine plain was injected in a digital block fashion, the foot was then prepped and draped using asceptic technique, anesthesia was confirmed, a #15 blade was inserted into the central/plantar aspect of the joint contracture and the flexor tendon was released, the digit was straightened manually and reduction was deemed adequate, tincture of benzoin and steri-strips were applied to splint the digit in a corrected position and closed with 4-0 nylon suture and dry sterile dressing applied to the foot. Capillary fill time was less than 3 seconds.  Postoperative instructions discussed in detail.  Follow-up in 1 week.

## 2023-11-27 ENCOUNTER — TELEPHONE (OUTPATIENT)
Dept: PODIATRY | Facility: CLINIC | Age: 73
End: 2023-11-27
Payer: MEDICARE

## 2023-11-27 NOTE — TELEPHONE ENCOUNTER
----- Message from Mitali Dill sent at 11/27/2023  2:41 PM CST -----  Contact: [phone]  RESCHEDULE  Pt is calling to reschedule her appt she states she over slept. First available appt in  Epic is 12/04 but, I see it ws a post op bree she pls call pt at [phone] She wanted to know if she can still come in today if possible

## 2023-11-29 ENCOUNTER — OFFICE VISIT (OUTPATIENT)
Dept: PODIATRY | Facility: CLINIC | Age: 73
End: 2023-11-29
Payer: MEDICARE

## 2023-11-29 VITALS
WEIGHT: 160.94 LBS | SYSTOLIC BLOOD PRESSURE: 132 MMHG | BODY MASS INDEX: 24.39 KG/M2 | DIASTOLIC BLOOD PRESSURE: 77 MMHG | HEART RATE: 60 BPM | HEIGHT: 68 IN

## 2023-11-29 DIAGNOSIS — M20.41 HAMMER TOES OF BOTH FEET: Primary | ICD-10-CM

## 2023-11-29 DIAGNOSIS — G60.8 SENSORY PERIPHERAL NEUROPATHY: ICD-10-CM

## 2023-11-29 DIAGNOSIS — M20.42 HAMMER TOES OF BOTH FEET: Primary | ICD-10-CM

## 2023-11-29 DIAGNOSIS — N18.30 STAGE 3 CHRONIC KIDNEY DISEASE, UNSPECIFIED WHETHER STAGE 3A OR 3B CKD: ICD-10-CM

## 2023-11-29 PROCEDURE — 3044F HG A1C LEVEL LT 7.0%: CPT | Mod: HCNC,CPTII,S$GLB, | Performed by: PODIATRIST

## 2023-11-29 PROCEDURE — 99999 PR PBB SHADOW E&M-EST. PATIENT-LVL III: ICD-10-PCS | Mod: PBBFAC,HCNC,, | Performed by: PODIATRIST

## 2023-11-29 PROCEDURE — 1125F AMNT PAIN NOTED PAIN PRSNT: CPT | Mod: HCNC,CPTII,S$GLB, | Performed by: PODIATRIST

## 2023-11-29 PROCEDURE — 99024 POSTOP FOLLOW-UP VISIT: CPT | Mod: HCNC,S$GLB,, | Performed by: PODIATRIST

## 2023-11-29 PROCEDURE — 3066F NEPHROPATHY DOC TX: CPT | Mod: HCNC,CPTII,S$GLB, | Performed by: PODIATRIST

## 2023-11-29 PROCEDURE — 3061F NEG MICROALBUMINURIA REV: CPT | Mod: HCNC,CPTII,S$GLB, | Performed by: PODIATRIST

## 2023-11-29 PROCEDURE — 3075F SYST BP GE 130 - 139MM HG: CPT | Mod: HCNC,CPTII,S$GLB, | Performed by: PODIATRIST

## 2023-11-29 PROCEDURE — 1159F PR MEDICATION LIST DOCUMENTED IN MEDICAL RECORD: ICD-10-PCS | Mod: HCNC,CPTII,S$GLB, | Performed by: PODIATRIST

## 2023-11-29 PROCEDURE — 1101F PR PT FALLS ASSESS DOC 0-1 FALLS W/OUT INJ PAST YR: ICD-10-PCS | Mod: HCNC,CPTII,S$GLB, | Performed by: PODIATRIST

## 2023-11-29 PROCEDURE — 1159F MED LIST DOCD IN RCRD: CPT | Mod: HCNC,CPTII,S$GLB, | Performed by: PODIATRIST

## 2023-11-29 PROCEDURE — 99024 PR POST-OP FOLLOW-UP VISIT: ICD-10-PCS | Mod: HCNC,S$GLB,, | Performed by: PODIATRIST

## 2023-11-29 PROCEDURE — 1125F PR PAIN SEVERITY QUANTIFIED, PAIN PRESENT: ICD-10-PCS | Mod: HCNC,CPTII,S$GLB, | Performed by: PODIATRIST

## 2023-11-29 PROCEDURE — 3075F PR MOST RECENT SYSTOLIC BLOOD PRESS GE 130-139MM HG: ICD-10-PCS | Mod: HCNC,CPTII,S$GLB, | Performed by: PODIATRIST

## 2023-11-29 PROCEDURE — 4010F ACE/ARB THERAPY RXD/TAKEN: CPT | Mod: HCNC,CPTII,S$GLB, | Performed by: PODIATRIST

## 2023-11-29 PROCEDURE — 1157F PR ADVANCE CARE PLAN OR EQUIV PRESENT IN MEDICAL RECORD: ICD-10-PCS | Mod: HCNC,CPTII,S$GLB, | Performed by: PODIATRIST

## 2023-11-29 PROCEDURE — 1157F ADVNC CARE PLAN IN RCRD: CPT | Mod: HCNC,CPTII,S$GLB, | Performed by: PODIATRIST

## 2023-11-29 PROCEDURE — 99999 PR PBB SHADOW E&M-EST. PATIENT-LVL III: CPT | Mod: PBBFAC,HCNC,, | Performed by: PODIATRIST

## 2023-11-29 PROCEDURE — 3288F PR FALLS RISK ASSESSMENT DOCUMENTED: ICD-10-PCS | Mod: HCNC,CPTII,S$GLB, | Performed by: PODIATRIST

## 2023-11-29 PROCEDURE — 1101F PT FALLS ASSESS-DOCD LE1/YR: CPT | Mod: HCNC,CPTII,S$GLB, | Performed by: PODIATRIST

## 2023-11-29 PROCEDURE — 3078F PR MOST RECENT DIASTOLIC BLOOD PRESSURE < 80 MM HG: ICD-10-PCS | Mod: HCNC,CPTII,S$GLB, | Performed by: PODIATRIST

## 2023-11-29 PROCEDURE — 3066F PR DOCUMENTATION OF TREATMENT FOR NEPHROPATHY: ICD-10-PCS | Mod: HCNC,CPTII,S$GLB, | Performed by: PODIATRIST

## 2023-11-29 PROCEDURE — 3061F PR NEG MICROALBUMINURIA RESULT DOCUMENTED/REVIEW: ICD-10-PCS | Mod: HCNC,CPTII,S$GLB, | Performed by: PODIATRIST

## 2023-11-29 PROCEDURE — 4010F PR ACE/ARB THEARPY RXD/TAKEN: ICD-10-PCS | Mod: HCNC,CPTII,S$GLB, | Performed by: PODIATRIST

## 2023-11-29 PROCEDURE — 3288F FALL RISK ASSESSMENT DOCD: CPT | Mod: HCNC,CPTII,S$GLB, | Performed by: PODIATRIST

## 2023-11-29 PROCEDURE — 3078F DIAST BP <80 MM HG: CPT | Mod: HCNC,CPTII,S$GLB, | Performed by: PODIATRIST

## 2023-11-29 PROCEDURE — 3044F PR MOST RECENT HEMOGLOBIN A1C LEVEL <7.0%: ICD-10-PCS | Mod: HCNC,CPTII,S$GLB, | Performed by: PODIATRIST

## 2023-11-30 NOTE — PROGRESS NOTES
Patient discharged by clinician 2/23/21. Previous program episode closed. Will begin new enrollment in new encounter.

## 2023-12-04 ENCOUNTER — PATIENT MESSAGE (OUTPATIENT)
Dept: ADMINISTRATIVE | Facility: OTHER | Age: 73
End: 2023-12-04
Payer: MEDICARE

## 2023-12-04 ENCOUNTER — PATIENT MESSAGE (OUTPATIENT)
Dept: OTHER | Facility: OTHER | Age: 73
End: 2023-12-04
Payer: MEDICARE

## 2023-12-10 ENCOUNTER — TELEPHONE (OUTPATIENT)
Dept: INTERNAL MEDICINE | Facility: CLINIC | Age: 73
End: 2023-12-10

## 2023-12-10 DIAGNOSIS — E78.5 HYPERLIPIDEMIA, UNSPECIFIED HYPERLIPIDEMIA TYPE: Primary | ICD-10-CM

## 2023-12-10 RX ORDER — PRAVASTATIN SODIUM 40 MG/1
40 TABLET ORAL DAILY
Qty: 90 TABLET | Refills: 3 | Status: SHIPPED | OUTPATIENT
Start: 2023-12-10 | End: 2024-04-15 | Stop reason: SDUPTHER

## 2023-12-10 NOTE — PROGRESS NOTES
Normal lab but cholesterol was a bit high can we double your pravastatin I will send the 40mg to the pharmacy.

## 2023-12-10 NOTE — PROGRESS NOTES
Patient presents 1 week status post tenotomy procedure right foot.  She is doing well.  No infection or dehiscence.  Neurovascular status intact.  Suture removed.  Patient may resume normal activities and shoe gear to tolerance.  Follow-up as needed.

## 2023-12-10 NOTE — PROGRESS NOTES
Subjective:       Patient ID: Manisha Wynne is a 73 y.o. female.    Chief Complaint: Annual Exam    HPIPt is doing ok but she has had some falls again. No CP or SOB.  Review of Systems   Constitutional:  Negative for activity change and unexpected weight change.   HENT:  Positive for hearing loss. Negative for trouble swallowing.    Eyes:  Negative for discharge and visual disturbance.   Respiratory:  Negative for chest tightness and wheezing.    Cardiovascular:  Negative for chest pain and palpitations.   Gastrointestinal:  Negative for blood in stool, constipation, diarrhea and vomiting.   Endocrine: Positive for polyuria. Negative for polydipsia.   Genitourinary:  Negative for difficulty urinating, dysuria, hematuria and menstrual problem.   Musculoskeletal:  Positive for neck pain. Negative for arthralgias and joint swelling.   Neurological:  Negative for weakness and headaches.   Psychiatric/Behavioral:  Positive for dysphoric mood. Negative for confusion.        Objective:      Physical Exam  Constitutional:       General: She is not in acute distress.     Appearance: She is well-developed.   HENT:      Head: Normocephalic.   Eyes:      Pupils: Pupils are equal, round, and reactive to light.   Neck:      Thyroid: No thyromegaly.      Vascular: No JVD.   Cardiovascular:      Rate and Rhythm: Normal rate and regular rhythm.      Heart sounds: Normal heart sounds. No murmur heard.     No friction rub. No gallop.   Pulmonary:      Effort: Pulmonary effort is normal.      Breath sounds: Normal breath sounds. No wheezing or rales.   Abdominal:      General: Bowel sounds are normal. There is no distension.      Palpations: Abdomen is soft. There is no mass.      Tenderness: There is no abdominal tenderness. There is no guarding or rebound.   Musculoskeletal:      Cervical back: Neck supple.   Lymphadenopathy:      Cervical: No cervical adenopathy.   Skin:     General: Skin is warm and dry.   Neurological:       Mental Status: She is alert and oriented to person, place, and time.      Deep Tendon Reflexes: Reflexes are normal and symmetric.   Psychiatric:         Behavior: Behavior normal.         Thought Content: Thought content normal.         Judgment: Judgment normal.         Assessment:       1. Primary hypertension    2. Hyperlipidemia, unspecified hyperlipidemia type    3. Hyperglycemia    4. Other osteoporosis without current pathological fracture    5. Urinary frequency    6. Screening mammogram, encounter for    7. Recurrent falls    8. B12 deficiency        Plan:   Primary hypertension  -     CBC Auto Differential; Future; Expected date: 11/13/2023  -     Comprehensive Metabolic Panel; Future; Expected date: 11/13/2023  -     TSH; Future; Expected date: 11/13/2023  -     Microalbumin/Creatinine Ratio, Urine; Future; Expected date: 11/13/2023  -     Urinalysis; Future; Expected date: 11/13/2023  -     Hypertension [facility] ([facility]) Enrollment Order  -     Hypertension [facility] ([facility]): Assign Onboarding Questionnaires    Hyperlipidemia, unspecified hyperlipidemia type  -     CBC Auto Differential; Future; Expected date: 11/13/2023  -     Lipid Panel; Future; Expected date: 11/13/2023    Hyperglycemia  -     Hemoglobin A1C; Future; Expected date: 11/13/2023    Other osteoporosis without current pathological fracture  -     Vitamin D; Future; Expected date: 11/13/2023    Urinary frequency  -     Urine culture; Future; Expected date: 11/13/2023    Screening mammogram, encounter for  -     Mammo Digital Screening Bilat w/ Dre; Future; Expected date: 05/13/2024    Recurrent falls  -     Ambulatory referral/consult to Neurology; Future; Expected date: 11/20/2023  -     Ambulatory referral/consult to Physical/Occupational Therapy; Future; Expected date: 11/20/2023    B12 deficiency  -     Vitamin B12; Future; Expected date: 12/13/2023  -     Methylmalonic Acid, Serum; Future; Expected date:  11/13/2023    Other orders  -     (In Office Administered) Pneumococcal Conjugate Vaccine (20 Valent) (IM) (Preferred)

## 2023-12-13 ENCOUNTER — TELEPHONE (OUTPATIENT)
Dept: PODIATRY | Facility: CLINIC | Age: 73
End: 2023-12-13

## 2023-12-13 ENCOUNTER — OFFICE VISIT (OUTPATIENT)
Dept: PODIATRY | Facility: CLINIC | Age: 73
End: 2023-12-13
Payer: MEDICARE

## 2023-12-13 ENCOUNTER — TELEPHONE (OUTPATIENT)
Dept: CARDIOLOGY | Facility: CLINIC | Age: 73
End: 2023-12-13
Payer: MEDICARE

## 2023-12-13 VITALS
HEIGHT: 68 IN | SYSTOLIC BLOOD PRESSURE: 133 MMHG | BODY MASS INDEX: 24.86 KG/M2 | HEART RATE: 60 BPM | WEIGHT: 164 LBS | DIASTOLIC BLOOD PRESSURE: 75 MMHG

## 2023-12-13 DIAGNOSIS — M20.41 HAMMER TOES OF BOTH FEET: Primary | ICD-10-CM

## 2023-12-13 DIAGNOSIS — M20.40 HAMMER TOE, UNSPECIFIED LATERALITY: ICD-10-CM

## 2023-12-13 DIAGNOSIS — M20.42 HAMMER TOES OF BOTH FEET: Primary | ICD-10-CM

## 2023-12-13 DIAGNOSIS — E78.2 MIXED HYPERLIPIDEMIA: ICD-10-CM

## 2023-12-13 DIAGNOSIS — E78.5 DYSLIPIDEMIA: Primary | ICD-10-CM

## 2023-12-13 DIAGNOSIS — L97.511 SKIN ULCER OF SECOND TOE OF RIGHT FOOT, LIMITED TO BREAKDOWN OF SKIN: ICD-10-CM

## 2023-12-13 PROCEDURE — 1160F RVW MEDS BY RX/DR IN RCRD: CPT | Mod: HCNC,CPTII,S$GLB, | Performed by: PODIATRIST

## 2023-12-13 PROCEDURE — 3044F PR MOST RECENT HEMOGLOBIN A1C LEVEL <7.0%: ICD-10-PCS | Mod: HCNC,CPTII,S$GLB, | Performed by: PODIATRIST

## 2023-12-13 PROCEDURE — 4010F ACE/ARB THERAPY RXD/TAKEN: CPT | Mod: HCNC,CPTII,S$GLB, | Performed by: PODIATRIST

## 2023-12-13 PROCEDURE — 1157F ADVNC CARE PLAN IN RCRD: CPT | Mod: HCNC,CPTII,S$GLB, | Performed by: PODIATRIST

## 2023-12-13 PROCEDURE — 3075F SYST BP GE 130 - 139MM HG: CPT | Mod: HCNC,CPTII,S$GLB, | Performed by: PODIATRIST

## 2023-12-13 PROCEDURE — 3061F PR NEG MICROALBUMINURIA RESULT DOCUMENTED/REVIEW: ICD-10-PCS | Mod: HCNC,CPTII,S$GLB, | Performed by: PODIATRIST

## 2023-12-13 PROCEDURE — 1125F AMNT PAIN NOTED PAIN PRSNT: CPT | Mod: HCNC,CPTII,S$GLB, | Performed by: PODIATRIST

## 2023-12-13 PROCEDURE — 3066F PR DOCUMENTATION OF TREATMENT FOR NEPHROPATHY: ICD-10-PCS | Mod: HCNC,CPTII,S$GLB, | Performed by: PODIATRIST

## 2023-12-13 PROCEDURE — 3066F NEPHROPATHY DOC TX: CPT | Mod: HCNC,CPTII,S$GLB, | Performed by: PODIATRIST

## 2023-12-13 PROCEDURE — 1125F PR PAIN SEVERITY QUANTIFIED, PAIN PRESENT: ICD-10-PCS | Mod: HCNC,CPTII,S$GLB, | Performed by: PODIATRIST

## 2023-12-13 PROCEDURE — 1159F MED LIST DOCD IN RCRD: CPT | Mod: HCNC,CPTII,S$GLB, | Performed by: PODIATRIST

## 2023-12-13 PROCEDURE — 99999 PR PBB SHADOW E&M-EST. PATIENT-LVL III: ICD-10-PCS | Mod: PBBFAC,HCNC,, | Performed by: PODIATRIST

## 2023-12-13 PROCEDURE — 3044F HG A1C LEVEL LT 7.0%: CPT | Mod: HCNC,CPTII,S$GLB, | Performed by: PODIATRIST

## 2023-12-13 PROCEDURE — 4010F PR ACE/ARB THEARPY RXD/TAKEN: ICD-10-PCS | Mod: HCNC,CPTII,S$GLB, | Performed by: PODIATRIST

## 2023-12-13 PROCEDURE — 3078F PR MOST RECENT DIASTOLIC BLOOD PRESSURE < 80 MM HG: ICD-10-PCS | Mod: HCNC,CPTII,S$GLB, | Performed by: PODIATRIST

## 2023-12-13 PROCEDURE — 1157F PR ADVANCE CARE PLAN OR EQUIV PRESENT IN MEDICAL RECORD: ICD-10-PCS | Mod: HCNC,CPTII,S$GLB, | Performed by: PODIATRIST

## 2023-12-13 PROCEDURE — 99024 PR POST-OP FOLLOW-UP VISIT: ICD-10-PCS | Mod: HCNC,S$GLB,, | Performed by: PODIATRIST

## 2023-12-13 PROCEDURE — 1160F PR REVIEW ALL MEDS BY PRESCRIBER/CLIN PHARMACIST DOCUMENTED: ICD-10-PCS | Mod: HCNC,CPTII,S$GLB, | Performed by: PODIATRIST

## 2023-12-13 PROCEDURE — 99999 PR PBB SHADOW E&M-EST. PATIENT-LVL III: CPT | Mod: PBBFAC,HCNC,, | Performed by: PODIATRIST

## 2023-12-13 PROCEDURE — 3078F DIAST BP <80 MM HG: CPT | Mod: HCNC,CPTII,S$GLB, | Performed by: PODIATRIST

## 2023-12-13 PROCEDURE — 1159F PR MEDICATION LIST DOCUMENTED IN MEDICAL RECORD: ICD-10-PCS | Mod: HCNC,CPTII,S$GLB, | Performed by: PODIATRIST

## 2023-12-13 PROCEDURE — 99024 POSTOP FOLLOW-UP VISIT: CPT | Mod: HCNC,S$GLB,, | Performed by: PODIATRIST

## 2023-12-13 PROCEDURE — 3075F PR MOST RECENT SYSTOLIC BLOOD PRESS GE 130-139MM HG: ICD-10-PCS | Mod: HCNC,CPTII,S$GLB, | Performed by: PODIATRIST

## 2023-12-13 PROCEDURE — 3061F NEG MICROALBUMINURIA REV: CPT | Mod: HCNC,CPTII,S$GLB, | Performed by: PODIATRIST

## 2023-12-13 NOTE — PROGRESS NOTES
Subjective:      Patient ID: Manisha Wynne is a 73 y.o. female.    Chief Complaint:   Toe Injury (Right 2nd toe/PCP- 11/14/2023/Iris Oden MD)    Manisha is a 73 y.o. female who presents to the clinic for evaluation and treatment of feet  Manisha has a past medical history of Abnormal Pap smear, Atrial fibrillation, AV block, 1st degree (07/25/2012), C. difficile diarrhea, Cataract, Depression (07/24/2012), Facet arthritis of lumbar region (03/31/2015), Falls, Hyperlipidemia, Hypertension (07/24/2012), Neuropathy, Other specified cardiac dysrhythmias(427.89), Sleep apnea, Syncope (07/24/2012), and Tremors of nervous system.  .  This patient has documented high risk feet requiring routine maintenance secondary to peripheral neuropathy.    Patient returns to clinic for foot evaluation.    Patient did proceed with tenotomy per Dr. oH right foot  Patient relates that she is concerned that her 2nd toe maybe infected  Patient relates that the right 3rd toe was the tenotomy toe/procedure    She discusses that there was a cut on her 2nd toe when the bandage was removed by the medical assistant in clinic on her last visit with Dr. Ho for dressing change.  She relates she discussed this with him in he did evaluate it and said he would take care of it.  Patient relates it seemed fine however this weekend the 2nd toe became red hot and swollen as she was concerned she had an infection so she came in today for evaluation  Overall patient feels the toe has improves this is weekend  There is no current drainage bleeding and pain has significantly improved    Patient denies fever chills nausea vomiting           PCP: Iris Blue MD    Date Last Seen by PCP: 11/14/23      Current shoe gear:  Affected Foot : clogs Crocs        Hemoglobin A1C   Date Value Ref Range Status   11/16/2023 5.5 4.0 - 5.6 % Final     Comment:     ADA Screening Guidelines:  5.7-6.4%  Consistent with prediabetes  >or=6.5%   Consistent with diabetes    High levels of fetal hemoglobin interfere with the HbA1C  assay. Heterozygous hemoglobin variants (HbS, HgC, etc)do  not significantly interfere with this assay.   However, presence of multiple variants may affect accuracy.     08/05/2022 5.3 4.0 - 5.6 % Final     Comment:     ADA Screening Guidelines:  5.7-6.4%  Consistent with prediabetes  >or=6.5%  Consistent with diabetes    High levels of fetal hemoglobin interfere with the HbA1C  assay. Heterozygous hemoglobin variants (HbS, HgC, etc)do  not significantly interfere with this assay.   However, presence of multiple variants may affect accuracy.     06/10/2021 5.4 4.0 - 5.6 % Final     Comment:     ADA Screening Guidelines:  5.7-6.4%  Consistent with prediabetes  >or=6.5%  Consistent with diabetes    High levels of fetal hemoglobin interfere with the HbA1C  assay. Heterozygous hemoglobin variants (HbS, HgC, etc)do  not significantly interfere with this assay.   However, presence of multiple variants may affect accuracy.     06/10/2021 5.4 4.0 - 5.6 % Final     Comment:     ADA Screening Guidelines:  5.7-6.4%  Consistent with prediabetes  >or=6.5%  Consistent with diabetes    High levels of fetal hemoglobin interfere with the HbA1C  assay. Heterozygous hemoglobin variants (HbS, HgC, etc)do  not significantly interfere with this assay.   However, presence of multiple variants may affect accuracy.          Past Medical History:   Diagnosis Date    Abnormal Pap smear     Atrial fibrillation     AV block, 1st degree 07/25/2012    C. difficile diarrhea     RESOLVED    Cataract     Depression 07/24/2012    Facet arthritis of lumbar region 03/31/2015    Falls     3 falls in the last 6 mos--noted 6/19/19    Hyperlipidemia     Hypertension 07/24/2012    Neuropathy     Other specified cardiac dysrhythmias(427.89)     Sleep apnea     Syncope 07/24/2012    Tremors of nervous system     hands bilaterally     Past Surgical History:   Procedure  Laterality Date    APPLICATION OF CARTILAGE GRAFT Bilateral 12/19/2019    Procedure: APPLICATION, CARTILAGE GRAFT AURICULAR JEZ;  Surgeon: Martinez Flores III, MD;  Location: [facility];  Service: ENT;  Laterality: Bilateral;    CARDIAC PACEMAKER PLACEMENT  09/07/2012    [id number]    CATARACT EXTRACTION W/  INTRAOCULAR LENS IMPLANT Right 5/16/2022    Procedure: EXTRACTION, CATARACT, WITH IOL INSERTION;  Surgeon: Ines Aguilera MD;  Location: [facility];  Service: Ophthalmology;  Laterality: Right;  Catalys     CATARACT EXTRACTION W/  INTRAOCULAR LENS IMPLANT Left 6/20/2022    Procedure: EXTRACTION, CATARACT, WITH IOL INSERTION;  Surgeon: Ines Aguilera MD;  Location: [facility] OR;  Service: Ophthalmology;  Laterality: Left;  Catalys     DILATION AND CURETTAGE OF UTERUS      Hx of precancerous cells?    ENDOSCOPIC ULTRASOUND OF UPPER GASTROINTESTINAL TRACT N/A 7/5/2019    Procedure: ULTRASOUND, UPPER GI TRACT, ENDOSCOPIC;  Surgeon: Kev Calderon MD;  Location: [facility] ENDO (2ND FLR);  Service: Endoscopy;  Laterality: N/A;  Pacemaker-Adam   appt confirmed-rb    MYELOGRAPHY N/A 5/4/2021    Procedure: Myelogram  CERVICAL, THORACIC AND LUMBAR;  Surgeon: [facility] Diagnostic Provider;  Location: [facility] OR 2ND FLR;  Service: Radiology;  Laterality: N/A;    NASAL SEPTOPLASTY N/A 12/19/2019    Procedure: SEPTOPLASTY, NOSE;  Surgeon: Martinez Flores III, MD;  Location: [facility];  Service: ENT;  Laterality: N/A;  FOLLOW DR SALVATORE KIMBROUGH PROTOCOL    OPEN REDUCTION AND INTERNAL FIXATION (ORIF) OF FRACTURE OF DISTAL RADIUS Left 1/24/2020    Procedure: ORIF, FRACTURE, RADIUS, DISTAL-left;  Surgeon: Ellen Moe MD;  Location: [facility];  Service: Orthopedics;  Laterality: Left;    POSTERIOR FUSION OF CERVICAL SPINE WITH LAMINECTOMY N/A 11/14/2022    Procedure: LAMINECTOMY, SPINE, CERVICAL, WITH POSTERIOR FUSION C3-C6;  Surgeon: Nicolette Cheek MD;  Location: [facility] 2ND FLR;  Service: Neurosurgery;  Laterality: N/A;  Bronson  III, ASA III, BLOOD TYPE AND SCREEN, NEUROMONITORING EMG SEP MEP, BRACE/HALO [place], BED REGULAR BED, HEADREST MARINO, POSITION PRONE, SPECIAL EQUIPMENT NIKI [place]DLE[place], RADIOLOGY C-ARM, EXT. BONE STIMULATOR BIOMET    REPLACEMENT OF PACEMAKER GENERATOR Left 10/7/2022    Procedure: REPLACEMENT, PACEMAKER GENERATOR;  Surgeon: John Sheets MD;  Location: [facility] EP LAB;  Service: Cardiology;  Laterality: Left;  YAS, SSS, AVB, Dual PPM Gen Change,SJM, MAC ,NH, 3 prep,** Aadm dcPPM in situ**    SURGICAL REMOVAL OF NASAL TURBINATE Bilateral 12/19/2019    Procedure: EXCISION, NASAL TURBINATE;  Surgeon: Martinez Flores III, MD;  Location: [facility] OR;  Service: ENT;  Laterality: Bilateral;    TONSILLECTOMY      WISDOM TOOTH EXTRACTION       Current Outpatient Medications on File Prior to Visit   Medication Sig Dispense Refill    acetaminophen (TYLENOL) 500 MG tablet Take 2 tablets (1,000 mg total) by mouth every 8 (eight) hours. (Patient taking differently: Take 1,000 mg by mouth 3 (three) times daily as needed.)  0    aspirin (ADULT LOW DOSE ASPIRIN) 81 MG EC tablet       buPROPion (WELLBUTRIN XL) 300 MG 24 hr tablet Take 1 tablet by mouth once daily.      cyanocobalamin (VITAMIN B-12) 1000 MCG tablet Take 100 mcg by mouth once daily.      gabapentin (NEURONTIN) 100 MG capsule One capsule 2 (two) times daily. 180 capsule 3    lisdexamfetamine (VYVANSE) 70 MG capsule Take 1 capsule (70 mg total) by mouth every morning. 30 capsule 0    losartan (COZAAR) 25 MG tablet Take 1 tablet (25 mg total) by mouth once daily. 90 tablet 6    methyl salicylate-menthol 15-10% 15-10 % Crea Apply topically every evening. 113 g 0    mupirocin (BACTROBAN) 2 % ointment Apply topically 3 (three) times daily. 2 g 0    pravastatin (PRAVACHOL) 40 MG tablet Take 1 tablet (40 mg total) by mouth once daily. 90 tablet 3    PROLIA 60 mg/mL Syrg       propranoloL (INDERAL) 20 MG tablet Take 1 tablet (20 mg total) by mouth 2 (two) times daily. 180  "tablet 3    calcium-vitamin D tablet 600 mg-200 units Take 1 tablet by mouth once daily. 30 tablet 0    FLUoxetine 40 MG capsule Take 1 capsule (40 mg total) by mouth once daily. 30 capsule 0     No current facility-administered medications on file prior to visit.     Review of patient's allergies indicates:  No Known Allergies    Review of Systems   Constitutional: Negative for chills, decreased appetite, fever, malaise/fatigue, night sweats, weight gain and weight loss.   Eyes:         Poor eyesight   Cardiovascular:  Negative for chest pain, claudication, dyspnea on exertion, leg swelling, palpitations and syncope.   Respiratory:  Negative for cough and shortness of breath.    Endocrine: Negative for cold intolerance and heat intolerance.   Hematologic/Lymphatic: Negative for bleeding problem. Does not bruise/bleed easily.   Skin:  Positive for nail changes. Negative for color change, dry skin, flushing, itching, poor wound healing, rash, skin cancer, suspicious lesions and unusual hair distribution.   Musculoskeletal:  Positive for arthritis and stiffness. Negative for back pain, falls, gout, joint pain, joint swelling, muscle cramps, muscle weakness, myalgias and neck pain.   Gastrointestinal:  Negative for diarrhea, nausea and vomiting.   Neurological:  Positive for numbness and paresthesias. Negative for dizziness, focal weakness, light-headedness, tremors, vertigo and weakness.   Psychiatric/Behavioral:  Negative for altered mental status and depression. The patient does not have insomnia.    Allergic/Immunologic: Negative.            Objective:       Vitals:    12/13/23 1300   BP: 133/75   Pulse: 60   Weight: 74.4 kg (164 lb 0.4 oz)   Height: 5' 8" (1.727 m)   PainSc:   3   PainLoc: Toe   74.4 kg (164 lb 0.4 oz) afeb    Physical Exam  Vitals reviewed.   Constitutional:       General: She is not in acute distress.     Appearance: She is well-developed. She is not ill-appearing, toxic-appearing or " diaphoretic.      Comments: Proper supportive shoegear   Cardiovascular:      Pulses:           Dorsalis pedis pulses are 2+ on the right side and 2+ on the left side.        Posterior tibial pulses are 1+ on the right side and 1+ on the left side.      Comments: No edema to digits  Musculoskeletal:         General: Deformity present.      Right lower leg: No edema.      Left lower leg: No edema.      Right foot: Decreased range of motion. Deformity, prominent metatarsal heads and tenderness present. No bony tenderness.      Left foot: Decreased range of motion. Deformity and prominent metatarsal heads present. No tenderness or bony tenderness.      Comments:    Semi- Reducible extensor and flexor contractures at the MTPJ and PIPJ of toes 2-5, bilat.     Mild pain with debridement of ulcer however no pain to digit with range of motion   Feet:      Right foot:      Protective Sensation: 10 sites tested.  0 sites sensed.      Skin integrity: No ulcer, blister, skin breakdown, erythema, warmth, callus or dry skin.      Toenail Condition: Right toenails are abnormally thick. Fungal disease present.     Left foot:      Protective Sensation: 10 sites tested.  0 sites sensed.      Skin integrity: Ulcer and skin breakdown present. No blister, erythema, warmth, callus or dry skin.      Toenail Condition: Left toenails are abnormally thick. Fungal disease present.     Comments:  No soi    Third digit no HPK no open lesion    Right 2nd dorsal lateral aspect of proximal phalangeal joint positive HPK ulceration noted no excessive warmth no fluctuance  Ulcer location:  right, 2nd toe   Pre debridement Measurements: 0.2 cmx 0.3cm x unknown cm   Post debridement Measurements : 0.3 cm x 0.4cm x 0.1  Signs of infection: No  Erythema: No  Undermining: Yes  Tunneling: No  Drainage: Serosanguineous  Periwound skin: intact mild HPK  Wound Base: granular   No malodor         Skin:     General: Skin is warm and dry.      Capillary Refill:  Capillary refill takes 2 to 3 seconds.      Coloration: Skin is not pale.      Findings: No erythema or rash.      Nails: There is clubbing.      Comments:          Neurological:      Mental Status: She is alert and oriented to person, place, and time.      Sensory: Sensory deficit present.      Motor: Atrophy present.      Gait: Gait abnormal.   Psychiatric:         Attention and Perception: Attention normal.         Mood and Affect: Mood normal.         Speech: Speech normal.         Thought Content: Thought content normal.         Cognition and Memory: Cognition normal.         Judgment: Judgment normal.                    Assessment:       Encounter Diagnoses   Name Primary?    Hammer toes of both feet Yes    Skin ulcer of second toe of right foot, limited to breakdown of skin     Hammer toe, unspecified laterality                    Plan:       Manisha was seen today for toe injury.    Diagnoses and all orders for this visit:    Hammer toes of both feet    Skin ulcer of second toe of right foot, limited to breakdown of skin    Hammer toe, unspecified laterality                I counseled the patient on her conditions, their implications and medical management.     No indication today for culture or oral antibiotics  No indication today for x-ray however can consider    - Shoe inspection. Patient instructed on proper foot hygeine. We discussed wearing proper shoe gear, daily foot inspections, never walking without protective shoe gear, never putting sharp instruments to feet, routine podiatric nail visits every 2-3 months.       - Debridement: With verbal consent, nonviable tissues on the right foot 2nd toe were debrided to subq utilizing a  sterile No. 15 scalpel and forceps. Minimal bleeding controlled with direct pressure  The patient tolerated this well.     Dressings: Gentian Violet  Offloading:Foam    Follow-up:Patient is to return to the clinic in 1 week  for follow-up but should call [facility] immediately  if any signs of infection, such as fever, chills, sweats, increased redness or pain.    Short-term goals include maintaining good offloading and minimizing bioburden, promoting granulation and epithelialization to healing.  Long-term goals include keeping the wound healed by good offloading and medical management under the direction of internist.     Continue Crocs comfortable shoes    Follow-up 1 week for re-evaluation call if needed to be seen sooner

## 2023-12-18 ENCOUNTER — HOSPITAL ENCOUNTER (EMERGENCY)
Facility: OTHER | Age: 73
Discharge: HOME | End: 2023-12-18
Attending: EMERGENCY MEDICINE
Payer: MEDICARE

## 2023-12-18 VITALS
SYSTOLIC BLOOD PRESSURE: 141 MMHG | OXYGEN SATURATION: 98 % | BODY MASS INDEX: 24.25 KG/M2 | DIASTOLIC BLOOD PRESSURE: 81 MMHG | HEIGHT: 68 IN | HEART RATE: 69 BPM | TEMPERATURE: 98 F | RESPIRATION RATE: 18 BRPM | WEIGHT: 160 LBS

## 2023-12-18 DIAGNOSIS — S00.83XA CONTUSION OF FACE, INITIAL ENCOUNTER: ICD-10-CM

## 2023-12-18 DIAGNOSIS — S00.81XA ABRASION, FACE W/O INFECTION: ICD-10-CM
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
## 2023-12-18 PROCEDURE — 99284 EMERGENCY DEPT VISIT MOD MDM: CPT | Mod: 25,HCNC

## 2023-12-19 ENCOUNTER — PATIENT OUTREACH (OUTPATIENT)
Dept: EMERGENCY MEDICINE | Facility: HOSPITAL | Age: 73
End: 2023-12-19
Payer: MEDICARE

## 2023-12-19 NOTE — ED TRIAGE NOTES
Manisha Wynne, an 73 y.o. female presents to the ED after having a fall last night. Pt had mechanical all, says she felt unsteady in her shoes. Tripped over stool and hit R side of face. Has abrasion to R side of face near nose. Denies LOC or use of blood thinners. Complaining of pain to area. Uses cane for ambulation. AAOx4, GCS 15.      Chief Complaint   Patient presents with    Fall     Pt reports fall last night where she landed on R side. Pt reports hitting R face on ground, abrasion noted to R face near nose, no LOC, complaining of pain to area     Review of patient's allergies indicates:  No Known Allergies  Past Medical History:   Diagnosis Date    Abnormal Pap smear     Atrial fibrillation     AV block, 1st degree 07/25/2012    C. difficile diarrhea     RESOLVED    Cataract     Depression 07/24/2012    Facet arthritis of lumbar region 03/31/2015    Falls     3 falls in the last 6 mos--noted 6/19/19    Hyperlipidemia     Hypertension 07/24/2012    Neuropathy     Other specified cardiac dysrhythmias(427.89)     Sleep apnea     Syncope 07/24/2012    Tremors of nervous system     hands bilaterally

## 2023-12-19 NOTE — ED PROVIDER NOTES
Encounter Date: 12/18/2023    SCRIBE #1 NOTE: I, Carlton Dudley, am scribing for, and in the presence of,  Javed Velazquez II, MD.       History     Chief Complaint   Patient presents with    Fall     Pt reports fall last night where she landed on R side. Pt reports hitting R face on ground, abrasion noted to R face near nose, no LOC, complaining of pain to area     Time seen by provider: 10:03 PM    Manisha Wynne is a 73 y.o. female, with a PMHx of HTN, A-Fib, and multiple falls, who presents to the ED with right-sided head pain following a fall at 1830 last night. The patient states she had risen out of bed quickly when she began feeling lightheaded and tripped forwards over a step stool. She notes persistent right-sided headaches following the incident without LOC or confusion. She denies any vision changes, numbness, weakness, or neck pain. Patient states the incident happened too late for her to come in last night and thus decided to come in the following day. This is the extent of the patient's complaints today in the Emergency Department.      The history is provided by the patient.     Review of patient's allergies indicates:  No Known Allergies  Past Medical History:   Diagnosis Date    Abnormal Pap smear     Atrial fibrillation     AV block, 1st degree 07/25/2012    C. difficile diarrhea     RESOLVED    Cataract     Depression 07/24/2012    Facet arthritis of lumbar region 03/31/2015    Falls     3 falls in the last 6 mos--noted 6/19/19    Hyperlipidemia     Hypertension 07/24/2012    Neuropathy     Other specified cardiac dysrhythmias(427.89)     Sleep apnea     Syncope 07/24/2012    Tremors of nervous system     hands bilaterally     Past Surgical History:   Procedure Laterality Date    APPLICATION OF CARTILAGE GRAFT Bilateral 12/19/2019    Procedure: APPLICATION, CARTILAGE GRAFT AURICULAR JEZ;  Surgeon: Martinez Flores III, MD;  Location: [facility];  Service: ENT;  Laterality: Bilateral;     CARDIAC PACEMAKER PLACEMENT  09/07/2012    [id number]    CATARACT EXTRACTION W/  INTRAOCULAR LENS IMPLANT Right 5/16/2022    Procedure: EXTRACTION, CATARACT, WITH IOL INSERTION;  Surgeon: Ines Aguilera MD;  Location: [facility] OR;  Service: Ophthalmology;  Laterality: Right;  Catalys     CATARACT EXTRACTION W/  INTRAOCULAR LENS IMPLANT Left 6/20/2022    Procedure: EXTRACTION, CATARACT, WITH IOL INSERTION;  Surgeon: Ines Aguilera MD;  Location: [facility] OR;  Service: Ophthalmology;  Laterality: Left;  Catalys     DILATION AND CURETTAGE OF UTERUS      Hx of precancerous cells?    ENDOSCOPIC ULTRASOUND OF UPPER GASTROINTESTINAL TRACT N/A 7/5/2019    Procedure: ULTRASOUND, UPPER GI TRACT, ENDOSCOPIC;  Surgeon: Kev Calderon MD;  Location: [facility] (2ND FLR);  Service: Endoscopy;  Laterality: N/A;  Pacemaker-Adam   appt confirmed-rb    MYELOGRAPHY N/A 5/4/2021    Procedure: Myelogram  CERVICAL, THORACIC AND LUMBAR;  Surgeon: [facility] Diagnostic Provider;  Location: [facility] OR 2ND FLR;  Service: Radiology;  Laterality: N/A;    NASAL SEPTOPLASTY N/A 12/19/2019    Procedure: SEPTOPLASTY, NOSE;  Surgeon: Martinez Flores III, MD;  Location: [facility];  Service: ENT;  Laterality: N/A;  FOLLOW DR SALVATORE KIMBROUGH PROTOCOL    OPEN REDUCTION AND INTERNAL FIXATION (ORIF) OF FRACTURE OF DISTAL RADIUS Left 1/24/2020    Procedure: ORIF, FRACTURE, RADIUS, DISTAL-left;  Surgeon: Ellen Moe MD;  Location: [facility];  Service: Orthopedics;  Laterality: Left;    POSTERIOR FUSION OF CERVICAL SPINE WITH LAMINECTOMY N/A 11/14/2022    Procedure: LAMINECTOMY, SPINE, CERVICAL, WITH POSTERIOR FUSION C3-C6;  Surgeon: Nicolette Cheek MD;  Location: [facility] OR 2ND FLR;  Service: Neurosurgery;  Laterality: N/A;  [place]ON[place] III, ASA III, BLOOD TYPE AND SCREEN, NEUROMONITORING EMG SEP MEP, BRACE/HALO [place], BED REGULAR BED, HEADREST [place], POSITION PRONE, SPECIAL EQUIPMENT NIKI [place], RADIOLOGY C-ARM, EXT. BONE STIMULATOR BIOMET     REPLACEMENT OF PACEMAKER GENERATOR Left 10/7/2022    Procedure: REPLACEMENT, PACEMAKER GENERATOR;  Surgeon: John Sheets MD;  Location: [facility] EP LAB;  Service: Cardiology;  Laterality: Left;  YAS, SSS, AVB, Dual PPM Gen Change,SJM, MAC ,NE, 3 prep,** Adam dcPPM in situ**    SURGICAL REMOVAL OF NASAL TURBINATE Bilateral 2019    Procedure: EXCISION, NASAL TURBINATE;  Surgeon: Martinez Flores III, MD;  Location: [facility] OR;  Service: ENT;  Laterality: Bilateral;    TONSILLECTOMY      WISDOM TOOTH EXTRACTION       Family History   Adopted: Yes   Problem Relation Age of Onset    Amblyopia Neg Hx     Blindness Neg Hx     Cataracts Neg Hx     Glaucoma Neg Hx     Macular degeneration Neg Hx     Retinal detachment Neg Hx      Social History     Tobacco Use    Smoking status: Former     Current packs/day: 0.00     Average packs/day: 0.3 packs/day for 15.0 years (3.8 ttl pk-yrs)     Types: Cigarettes     Start date: 1967     Quit date: 1982     Years since quittin.4    Smokeless tobacco: Former   Substance Use Topics    Alcohol use: Yes     Comment: no daily or heavy use, ~4 times per month    Drug use: No     Review of Systems   Constitutional:  Negative for chills, diaphoresis, fatigue and fever.   HENT:  Negative for ear discharge, facial swelling, hearing loss, nosebleeds and sore throat.    Eyes:  Negative for photophobia, pain, discharge and visual disturbance.   Respiratory:  Negative for choking, chest tightness, shortness of breath and wheezing.    Cardiovascular:  Negative for chest pain, palpitations and leg swelling.   Gastrointestinal:  Negative for abdominal distention, abdominal pain, blood in stool, constipation, diarrhea, nausea and vomiting.   Genitourinary:  Negative for difficulty urinating, dysuria, frequency, hematuria, pelvic pain, urgency, vaginal bleeding and vaginal discharge.   Musculoskeletal:  Negative for back pain, gait problem, joint swelling, myalgias and neck stiffness.    Skin:  Negative for color change, pallor and rash.   Neurological:  Positive for headaches. Negative for seizures, syncope, facial asymmetry, weakness and numbness.   Hematological:  Negative for adenopathy. Does not bruise/bleed easily.   Psychiatric/Behavioral:  Negative for confusion, hallucinations, self-injury and suicidal ideas.        Physical Exam     Initial Vitals [12/18/23 1849]   BP Pulse Resp Temp SpO2   (!) 145/70 69 18 98.9 °F (37.2 °C) 95 %      MAP       --         Physical Exam    Nursing note and vitals reviewed.  Constitutional: She appears well-developed and well-nourished. She is not diaphoretic. No distress.   HENT:   Head: Normocephalic.   Abrasion and soft tissue swelling to the right anterior cheek and lateral face. No other cranial or facial trauma.    Eyes: EOM are normal. Pupils are equal, round, and reactive to light.   No pallor or icterus.   Neck: Neck supple.   Neck with chronically decreased range of motion due to surgery but no midline or paraspinal tenderness.   Normal range of motion.  Cardiovascular:  Normal rate, regular rhythm and normal heart sounds.     Exam reveals no gallop and no friction rub.       No murmur heard.  Pulmonary/Chest: Breath sounds normal. No respiratory distress. She has no wheezes. She has no rhonchi. She has no rales.   Abdominal: Abdomen is soft. There is no abdominal tenderness.   Musculoskeletal:         General: No tenderness or edema. Normal range of motion.      Cervical back: Normal range of motion and neck supple.     Lymphadenopathy:     She has no cervical adenopathy.   Neurological: She is alert and oriented to person, place, and time.   Skin: Skin is warm and dry.   Psychiatric: She has a normal mood and affect. Her behavior is normal. Judgment and thought content normal.         ED Course   Procedures  Labs Reviewed - No data to display       Imaging Results              CT Maxillofacial Without Contrast (Final result)  Result time  12/18/23 23:00:21      Final result by Rodrigo Alcaraz MD (12/18/23 23:00:21)                   Impression:      Stable CT of the brain with no evidence of hemorrhage, mass or infarction.    No evidence of calvarial or facial fracture.    Right mastoid effusion.      Electronically signed by: Rodrigo Alcaraz  Date:    12/18/2023  Time:    23:00               Narrative:    EXAMINATION:  CT HEAD WITHOUT CONTRAST; CT MAXILLOFACIAL WITHOUT CONTRAST    CLINICAL HISTORY:  Head trauma, minor (Age >= 65y);; Facial trauma, blunt;    TECHNIQUE:  Low dose axial images were obtained through the head, maxillofacial region and cervical spine.  Coronal and sagittal reformations were also performed. Contrast was not administered.    COMPARISON:  None.    FINDINGS:  BRAIN:    Brain parenchyma appears stable with moderate involutional and chronic small vessel type changes manifested by prominence of the cortical sulcal markings, basilar cisterns and ventricular system. The calvarium is intact. No displaced fracture or pathologic fluid collection. No soft tissue swelling of the calvarial scalp. No foreign body or air.  Middle ears mastoids visualized paranasal sinuses clear with the exception of small effusion in the right lower mastoids with cerumen in the external auditory canal.    MAXILLOFACIAL REGION:    Maxillofacial bones appear intact.  Bilateral ocular lens replacement.  No preseptal soft tissue swelling.  Sinuses clear.  Mastoid effusion on the right.  Otherwise skull base intact.  Cervical fusion hardware posteriorly at C2 and C3.  No soft tissue swelling or adenopathy.  Mandible and maxilla intact with multiple dental appliances and periodontal disease surrounding the roots of tooth 3 and 4 as well as dental implants.                                       CT Head Without Contrast (Final result)  Result time 12/18/23 23:00:21      Final result by Rodrigo Alcaraz MD (12/18/23 23:00:21)                   Impression:       Stable CT of the brain with no evidence of hemorrhage, mass or infarction.    No evidence of calvarial or facial fracture.    Right mastoid effusion.      Electronically signed by: Rodrigo Alcaraz  Date:    12/18/2023  Time:    23:00               Narrative:    EXAMINATION:  CT HEAD WITHOUT CONTRAST; CT MAXILLOFACIAL WITHOUT CONTRAST    CLINICAL HISTORY:  Head trauma, minor (Age >= 65y);; Facial trauma, blunt;    TECHNIQUE:  Low dose axial images were obtained through the head, maxillofacial region and cervical spine.  Coronal and sagittal reformations were also performed. Contrast was not administered.    COMPARISON:  None.    FINDINGS:  BRAIN:    Brain parenchyma appears stable with moderate involutional and chronic small vessel type changes manifested by prominence of the cortical sulcal markings, basilar cisterns and ventricular system. The calvarium is intact. No displaced fracture or pathologic fluid collection. No soft tissue swelling of the calvarial scalp. No foreign body or air.  Middle ears mastoids visualized paranasal sinuses clear with the exception of small effusion in the right lower mastoids with cerumen in the external auditory canal.    MAXILLOFACIAL REGION:    Maxillofacial bones appear intact.  Bilateral ocular lens replacement.  No preseptal soft tissue swelling.  Sinuses clear.  Mastoid effusion on the right.  Otherwise skull base intact.  Cervical fusion hardware posteriorly at C2 and C3.  No soft tissue swelling or adenopathy.  Mandible and maxilla intact with multiple dental appliances and periodontal disease surrounding the roots of tooth 3 and 4 as well as dental implants.                                       Medications - No data to display  Medical Decision Making  Amount and/or Complexity of Data Reviewed  Radiology: ordered.    Patient presents after ground level fall, striking her face on the wall or a step stool as she fell.  No syncope or loss of consciousness.  She does have  facial abrasion/contusion.  Given age, did obtain CT scan of brain and face which shows no intracranial injury or facial fractures.  The fall happened 24 hours ago.  At this time I feel the patient is stable for discharge, does not require further neurologic observation.  Discussed return precautions with the patient, she feels comfortable doing so.        Scribe Attestation:   Scribe #1: I performed the above scribed service and the documentation accurately describes the services I performed. I attest to the accuracy of the note.    Physician Attestation for Scribe: I, [facility], reviewed documentation as scribed in my presence, which is both accurate and complete.                              Clinical Impression:  Final diagnoses:  [W19.XXXA] Fall, initial encounter (Primary)  [S00.81XA] Abrasion, face w/o infection  [S00.83XA] Contusion of face, initial encounter          ED Disposition Condition    Discharge Stable          ED Prescriptions    None       Follow-up Information       Follow up With Specialties Details Why Contact Info    Iris Blue MD Internal Medicine  As needed [address]  [place] [address]  [phone]               Javed Velazquez II, MD  12/19/23 2022

## 2023-12-20 NOTE — PROGRESS NOTES
Patient was seen in the Ed on 12/18/23. Phoned patient on 2 separate occasions to assist with Post ED Discharge Navigation. Patient was unavailable. Message left on voice mail.  Maureen Guerra

## 2023-12-26 ENCOUNTER — TELEPHONE (OUTPATIENT)
Dept: PODIATRY | Facility: CLINIC | Age: 73
End: 2023-12-26
Payer: MEDICARE

## 2023-12-26 NOTE — TELEPHONE ENCOUNTER
Left vm message for patient in regards to her scheduled appointment on 12/26/2023 with Dr. Rao(Podiatry) and if unable to make appt. to contact office at [phone] to reschedule

## 2023-12-28 DIAGNOSIS — I48.91 ATRIAL FIBRILLATION, UNSPECIFIED TYPE: Primary | ICD-10-CM

## 2023-12-28 DIAGNOSIS — I45.10 RBBB: ICD-10-CM

## 2023-12-28 DIAGNOSIS — M54.16 LUMBAR RADICULOPATHY: Primary | ICD-10-CM

## 2024-01-02 ENCOUNTER — CLINICAL SUPPORT (OUTPATIENT)
Dept: CARDIOLOGY | Facility: HOSPITAL | Age: 74
End: 2024-01-02
Payer: MEDICARE

## 2024-01-02 ENCOUNTER — CLINICAL SUPPORT (OUTPATIENT)
Dept: CARDIOLOGY | Facility: HOSPITAL | Age: 74
End: 2024-01-02
Attending: INTERNAL MEDICINE
Payer: MEDICARE

## 2024-01-02 DIAGNOSIS — Z95.0 PRESENCE OF CARDIAC PACEMAKER: ICD-10-CM

## 2024-01-02 DIAGNOSIS — R00.1 BRADYCARDIA, UNSPECIFIED: ICD-10-CM

## 2024-01-02 PROCEDURE — 93296 REM INTERROG EVL PM/IDS: CPT | Performed by: INTERNAL MEDICINE

## 2024-01-02 PROCEDURE — 93294 REM INTERROG EVL PM/LDLS PM: CPT | Mod: ,,, | Performed by: INTERNAL MEDICINE

## 2024-01-09 ENCOUNTER — OFFICE VISIT (OUTPATIENT)
Dept: PODIATRY | Facility: CLINIC | Age: 74
End: 2024-01-09
Payer: MEDICARE

## 2024-01-09 VITALS
HEIGHT: 68 IN | DIASTOLIC BLOOD PRESSURE: 72 MMHG | HEART RATE: 67 BPM | BODY MASS INDEX: 24.26 KG/M2 | WEIGHT: 160.06 LBS | SYSTOLIC BLOOD PRESSURE: 123 MMHG

## 2024-01-09 DIAGNOSIS — M20.42 HAMMER TOES OF BOTH FEET: Primary | ICD-10-CM

## 2024-01-09 DIAGNOSIS — N18.30 STAGE 3 CHRONIC KIDNEY DISEASE, UNSPECIFIED WHETHER STAGE 3A OR 3B CKD: ICD-10-CM

## 2024-01-09 DIAGNOSIS — M20.41 HAMMER TOES OF BOTH FEET: Primary | ICD-10-CM

## 2024-01-09 DIAGNOSIS — B35.1 ONYCHOMYCOSIS DUE TO DERMATOPHYTE: ICD-10-CM

## 2024-01-09 DIAGNOSIS — G60.8 SENSORY PERIPHERAL NEUROPATHY: ICD-10-CM

## 2024-01-09 DIAGNOSIS — L97.511 SKIN ULCER OF SECOND TOE OF RIGHT FOOT, LIMITED TO BREAKDOWN OF SKIN: ICD-10-CM

## 2024-01-09 PROCEDURE — 3074F SYST BP LT 130 MM HG: CPT | Mod: CPTII,S$GLB,, | Performed by: PODIATRIST

## 2024-01-09 PROCEDURE — 1159F MED LIST DOCD IN RCRD: CPT | Mod: CPTII,S$GLB,, | Performed by: PODIATRIST

## 2024-01-09 PROCEDURE — 99214 OFFICE O/P EST MOD 30 MIN: CPT | Mod: 24,S$GLB,, | Performed by: PODIATRIST

## 2024-01-09 PROCEDURE — 1126F AMNT PAIN NOTED NONE PRSNT: CPT | Mod: CPTII,S$GLB,, | Performed by: PODIATRIST

## 2024-01-09 PROCEDURE — 1160F RVW MEDS BY RX/DR IN RCRD: CPT | Mod: CPTII,S$GLB,, | Performed by: PODIATRIST

## 2024-01-09 PROCEDURE — 1157F ADVNC CARE PLAN IN RCRD: CPT | Mod: CPTII,S$GLB,, | Performed by: PODIATRIST

## 2024-01-09 PROCEDURE — 3078F DIAST BP <80 MM HG: CPT | Mod: CPTII,S$GLB,, | Performed by: PODIATRIST

## 2024-01-09 PROCEDURE — 99999 PR PBB SHADOW E&M-EST. PATIENT-LVL III: CPT | Mod: PBBFAC,,, | Performed by: PODIATRIST

## 2024-01-09 NOTE — PROGRESS NOTES
Subjective:      Patient ID: Manisha Wynne is a 73 y.o. female.    Chief Complaint:   Follow-up (Bilateral)    Manisha is a 73 y.o. female who presents to the clinic for evaluation and treatment of feet  Manisha has a past medical history of Abnormal Pap smear, Atrial fibrillation, AV block, 1st degree (07/25/2012), C. difficile diarrhea, Cataract, Depression (07/24/2012), Facet arthritis of lumbar region (03/31/2015), Falls, Hyperlipidemia, Hypertension (07/24/2012), Neuropathy, Other specified cardiac dysrhythmias(427.89), Sleep apnea, Syncope (07/24/2012), and Tremors of nervous system.  .  This patient has documented high risk feet requiring routine maintenance secondary to peripheral neuropathy.    Patient returns to clinic for foot evaluation.    Using bandaid right 2nd/3rd toe.   Doing better.  No concerns for infection anymore  Inquiring if should get tenotomy procedures on other toes. Somewhat anxious about it.     Wearing crocs.   Working          PCP: Iris Blue MD    Date Last Seen by PCP: 11/14/23      Current shoe gear:  Affected Foot : clogs Crocs        Hemoglobin A1C   Date Value Ref Range Status   11/16/2023 5.5 4.0 - 5.6 % Final     Comment:     ADA Screening Guidelines:  5.7-6.4%  Consistent with prediabetes  >or=6.5%  Consistent with diabetes    High levels of fetal hemoglobin interfere with the HbA1C  assay. Heterozygous hemoglobin variants (HbS, HgC, etc)do  not significantly interfere with this assay.   However, presence of multiple variants may affect accuracy.     08/05/2022 5.3 4.0 - 5.6 % Final     Comment:     ADA Screening Guidelines:  5.7-6.4%  Consistent with prediabetes  >or=6.5%  Consistent with diabetes    High levels of fetal hemoglobin interfere with the HbA1C  assay. Heterozygous hemoglobin variants (HbS, HgC, etc)do  not significantly interfere with this assay.   However, presence of multiple variants may affect accuracy.     06/10/2021 5.4 4.0 - 5.6 %  Final     Comment:     ADA Screening Guidelines:  5.7-6.4%  Consistent with prediabetes  >or=6.5%  Consistent with diabetes    High levels of fetal hemoglobin interfere with the HbA1C  assay. Heterozygous hemoglobin variants (HbS, HgC, etc)do  not significantly interfere with this assay.   However, presence of multiple variants may affect accuracy.     06/10/2021 5.4 4.0 - 5.6 % Final     Comment:     ADA Screening Guidelines:  5.7-6.4%  Consistent with prediabetes  >or=6.5%  Consistent with diabetes    High levels of fetal hemoglobin interfere with the HbA1C  assay. Heterozygous hemoglobin variants (HbS, HgC, etc)do  not significantly interfere with this assay.   However, presence of multiple variants may affect accuracy.          Past Medical History:   Diagnosis Date    Abnormal Pap smear     Atrial fibrillation     AV block, 1st degree 07/25/2012    C. difficile diarrhea     RESOLVED    Cataract     Depression 07/24/2012    Facet arthritis of lumbar region 03/31/2015    Falls     3 falls in the last 6 mos--noted 6/19/19    Hyperlipidemia     Hypertension 07/24/2012    Neuropathy     Other specified cardiac dysrhythmias(427.89)     Sleep apnea     Syncope 07/24/2012    Tremors of nervous system     hands bilaterally     Past Surgical History:   Procedure Laterality Date    APPLICATION OF CARTILAGE GRAFT Bilateral 12/19/2019    Procedure: APPLICATION, CARTILAGE GRAFT AURICULAR JEZ;  Surgeon: Martinez Flores III, MD;  Location: [facility];  Service: ENT;  Laterality: Bilateral;    CARDIAC PACEMAKER PLACEMENT  09/07/2012    [id number]    CATARACT EXTRACTION W/  INTRAOCULAR LENS IMPLANT Right 5/16/2022    Procedure: EXTRACTION, CATARACT, WITH IOL INSERTION;  Surgeon: Ines Aguilera MD;  Location: [facility] OR;  Service: Ophthalmology;  Laterality: Right;  Catalys     CATARACT EXTRACTION W/  INTRAOCULAR LENS IMPLANT Left 6/20/2022    Procedure: EXTRACTION, CATARACT, WITH IOL INSERTION;  Surgeon: Ines REHMAN  MD Ale;  Location: [facility];  Service: Ophthalmology;  Laterality: Left;  Catalys     DILATION AND CURETTAGE OF UTERUS      Hx of precancerous cells?    ENDOSCOPIC ULTRASOUND OF UPPER GASTROINTESTINAL TRACT N/A 7/5/2019    Procedure: ULTRASOUND, UPPER GI TRACT, ENDOSCOPIC;  Surgeon: Kev Calderon MD;  Location: [facility] ENDO (2ND FLR);  Service: Endoscopy;  Laterality: N/A;  Pacemaker-Adam   appt confirmed-rb    MYELOGRAPHY N/A 5/4/2021    Procedure: Myelogram  CERVICAL, THORACIC AND LUMBAR;  Surgeon: [facility] Diagnostic Provider;  Location: [facility] 2ND FLR;  Service: Radiology;  Laterality: N/A;    NASAL SEPTOPLASTY N/A 12/19/2019    Procedure: SEPTOPLASTY, NOSE;  Surgeon: Martinez Flores III, MD;  Location: [facility];  Service: ENT;  Laterality: N/A;  FOLLOW DR SALVATORE KIMBROUGH PROTOCOL    OPEN REDUCTION AND INTERNAL FIXATION (ORIF) OF FRACTURE OF DISTAL RADIUS Left 1/24/2020    Procedure: ORIF, FRACTURE, RADIUS, DISTAL-left;  Surgeon: Ellen Moe MD;  Location: [facility] OR;  Service: Orthopedics;  Laterality: Left;    POSTERIOR FUSION OF CERVICAL SPINE WITH LAMINECTOMY N/A 11/14/2022    Procedure: LAMINECTOMY, SPINE, CERVICAL, WITH POSTERIOR FUSION C3-C6;  Surgeon: Nicolette Cheek MD;  Location: [facility] 2ND FLR;  Service: Neurosurgery;  Laterality: N/A;  TORONTO III, ASA III, BLOOD TYPE AND SCREEN, NEUROMONITORING EMG SEP MEP, BRACE/HALO [place], BED REGULAR BED, HEADREST [place], POSITION PRONE, SPECIAL EQUIPMENT NIKI [place]LE[place], RADIOLOGY C-ARM, EXT. BONE STIMULATOR BIOMET    REPLACEMENT OF PACEMAKER GENERATOR Left 10/7/2022    Procedure: REPLACEMENT, PACEMAKER GENERATOR;  Surgeon: John Sheets MD;  Location: [facility] EP LAB;  Service: Cardiology;  Laterality: Left;  YAS, SSS, AVB, Dual PPM Gen Change,SJM, MAC ,OR, 3 prep,** Adam dcPPM in situ**    SURGICAL REMOVAL OF NASAL TURBINATE Bilateral 12/19/2019    Procedure: EXCISION, NASAL TURBINATE;  Surgeon: Martinez Flores III, MD;  Location: [facility];  Service:  ENT;  Laterality: Bilateral;    TONSILLECTOMY      WISDOM TOOTH EXTRACTION       Current Outpatient Medications on File Prior to Visit   Medication Sig Dispense Refill    acetaminophen (TYLENOL) 500 MG tablet Take 2 tablets (1,000 mg total) by mouth every 8 (eight) hours. (Patient taking differently: Take 1,000 mg by mouth 3 (three) times daily as needed.)  0    aspirin (ADULT LOW DOSE ASPIRIN) 81 MG EC tablet       buPROPion (WELLBUTRIN XL) 300 MG 24 hr tablet Take 1 tablet by mouth once daily.      cyanocobalamin (VITAMIN B-12) 1000 MCG tablet Take 100 mcg by mouth once daily.      gabapentin (NEURONTIN) 100 MG capsule One capsule 2 (two) times daily. 180 capsule 3    lisdexamfetamine (VYVANSE) 70 MG capsule Take 1 capsule (70 mg total) by mouth every morning. 30 capsule 0    losartan (COZAAR) 25 MG tablet Take 1 tablet (25 mg total) by mouth once daily. 90 tablet 6    methyl salicylate-menthol 15-10% 15-10 % Crea Apply topically every evening. 113 g 0    mupirocin (BACTROBAN) 2 % ointment Apply topically 3 (three) times daily. 2 g 0    pravastatin (PRAVACHOL) 40 MG tablet Take 1 tablet (40 mg total) by mouth once daily. 90 tablet 3    PROLIA 60 mg/mL Syrg       propranoloL (INDERAL) 20 MG tablet Take 1 tablet (20 mg total) by mouth 2 (two) times daily. 180 tablet 3    calcium-vitamin D tablet 600 mg-200 units Take 1 tablet by mouth once daily. 30 tablet 0    FLUoxetine 40 MG capsule Take 1 capsule (40 mg total) by mouth once daily. 30 capsule 0     No current facility-administered medications on file prior to visit.     Review of patient's allergies indicates:  No Known Allergies    Review of Systems   Constitutional: Negative for chills, decreased appetite, fever, malaise/fatigue, night sweats, weight gain and weight loss.   Eyes:         Poor eyesight   Cardiovascular:  Negative for chest pain, claudication, dyspnea on exertion, leg swelling, palpitations and syncope.   Respiratory:  Negative for cough and  "shortness of breath.    Endocrine: Negative for cold intolerance and heat intolerance.   Hematologic/Lymphatic: Negative for bleeding problem. Does not bruise/bleed easily.   Skin:  Positive for nail changes. Negative for color change, dry skin, flushing, itching, poor wound healing, rash, skin cancer, suspicious lesions and unusual hair distribution.   Musculoskeletal:  Positive for arthritis and stiffness. Negative for back pain, falls, gout, joint pain, joint swelling, muscle cramps, muscle weakness, myalgias and neck pain.   Gastrointestinal:  Negative for diarrhea, nausea and vomiting.   Neurological:  Positive for numbness and paresthesias. Negative for dizziness, focal weakness, light-headedness, tremors, vertigo and weakness.   Psychiatric/Behavioral:  Negative for altered mental status and depression. The patient does not have insomnia.    Allergic/Immunologic: Negative.            Objective:       Vitals:    01/09/24 1459   BP: 123/72   Pulse: 67   Weight: 72.6 kg (160 lb 0.9 oz)   Height: 5' 8" (1.727 m)   PainSc: 0-No pain   72.6 kg (160 lb 0.9 oz) afeb    Physical Exam  Vitals reviewed.   Constitutional:       General: She is not in acute distress.     Appearance: She is well-developed. She is not ill-appearing, toxic-appearing or diaphoretic.      Comments: Proper supportive shoegear   Cardiovascular:      Pulses:           Dorsalis pedis pulses are 2+ on the right side and 2+ on the left side.        Posterior tibial pulses are 1+ on the right side and 1+ on the left side.      Comments: No edema to digits  Musculoskeletal:         General: Deformity present.      Right lower leg: No edema.      Left lower leg: No edema.      Right foot: Decreased range of motion. Deformity and prominent metatarsal heads present. No tenderness or bony tenderness.      Left foot: Decreased range of motion. Deformity and prominent metatarsal heads present. No tenderness or bony tenderness.      Comments:    Semi- " Reducible extensor and flexor contractures at the MTPJ and PIPJ of toes 2-5, bilat.     No pop    Feet:      Right foot:      Protective Sensation: 10 sites tested.  0 sites sensed.      Skin integrity: No ulcer, blister, skin breakdown, erythema, warmth, callus or dry skin.      Toenail Condition: Right toenails are abnormally thick. Fungal disease present.     Left foot:      Protective Sensation: 10 sites tested.  0 sites sensed.      Skin integrity: Ulcer and skin breakdown present. No blister, erythema, warmth, callus or dry skin.      Toenail Condition: Left toenails are abnormally thick. Fungal disease present.     Comments:  No soi    Third digit no HPK no open lesion    Right 2nd dorsal lateral aspect of proximal phalangeal joint positive HPK ulceration noted no excessive warmth no fluctuance  Ulcer location:  right, 2nd toe   Pre debridement Measurements: 0.2 cmx 0.2cm x unknown cm   Post debridement Measurements : 0.2 cm x 0.2cm x 0.1  Signs of infection: No  Erythema: No  Undermining: Yes  Tunneling: No  Drainage: no   Periwound skin: intact mild HPK/eschar  Wound Base: granular/epitheal           Skin:     General: Skin is warm and dry.      Capillary Refill: Capillary refill takes 2 to 3 seconds.      Coloration: Skin is not pale.      Findings: No erythema or rash.      Nails: There is clubbing.      Comments:          Neurological:      Mental Status: She is alert and oriented to person, place, and time.      Sensory: Sensory deficit present.      Motor: Atrophy present.      Gait: Gait abnormal.   Psychiatric:         Attention and Perception: Attention normal.         Mood and Affect: Mood normal.         Speech: Speech normal.         Thought Content: Thought content normal.         Cognition and Memory: Cognition normal.         Judgment: Judgment normal.                    Assessment:       Encounter Diagnoses   Name Primary?    Hammer toes of both feet Yes    Skin ulcer of second toe of right  foot, limited to breakdown of skin     Stage 3 chronic kidney disease, unspecified whether stage 3a or 3b CKD     Sensory peripheral neuropathy     Onychomycosis due to dermatophyte                      Plan:       Manisha was seen today for follow-up.    Diagnoses and all orders for this visit:    Hammer toes of both feet    Skin ulcer of second toe of right foot, limited to breakdown of skin    Stage 3 chronic kidney disease, unspecified whether stage 3a or 3b CKD    Sensory peripheral neuropathy    Onychomycosis due to dermatophyte                  I counseled the patient on her conditions, their implications and medical management.         - Shoe inspection. Patient instructed on proper foot hygeine. We discussed wearing proper shoe gear, daily foot inspections, never walking without protective shoe gear, never putting sharp instruments to feet, routine podiatric nail visits every 2-3 months.           - With patient's permission, the toenails mentioned above were aggressively reduced and debrided using a nail nipper, removing all offending nail and debris. Utilizing a #15 scalpel, I trimmed the corns and calluses at the above mentioned location.      The patient will continue to monitor the areas daily, inspect the feet, wear protective shoe gear when ambulatory, and moisturizer to maintain skin integrity.     Ulcer improved. Superficial   Recommend vasaline or aquaphor    Do recommend tenotomy for 2nd and 4th but due to pt's anxiety recommend monitor and return in few months for re-eval    Follow-up 1 week for re-evaluation call if needed to be seen sooner

## 2024-01-17 LAB
OHS CV AF BURDEN PERCENT: < 1
OHS CV DC REMOTE DEVICE TYPE: NORMAL
OHS CV ICD SHOCK: NO
OHS CV RV PACING PERCENT: 3.2 %

## 2024-01-23 ENCOUNTER — PATIENT OUTREACH (OUTPATIENT)
Dept: EMERGENCY MEDICINE | Facility: HOSPITAL | Age: 74
End: 2024-01-23
Payer: MEDICARE

## 2024-01-23 ENCOUNTER — LAB VISIT (OUTPATIENT)
Dept: LAB | Facility: OTHER | Age: 74
End: 2024-01-23
Attending: INTERNAL MEDICINE
Payer: MEDICARE

## 2024-01-23 DIAGNOSIS — E78.5 HYPERLIPIDEMIA, UNSPECIFIED HYPERLIPIDEMIA TYPE: ICD-10-CM

## 2024-01-23 LAB
ALT SERPL W/O P-5'-P-CCNC: 19 U/L (ref 10–44)
CHOLEST SERPL-MCNC: 177 MG/DL (ref 120–199)
CHOLEST/HDLC SERPL: 2.4 {RATIO} (ref 2–5)
CK SERPL-CCNC: 43 U/L (ref 20–180)
HDLC SERPL-MCNC: 75 MG/DL (ref 40–75)
HDLC SERPL: 42.4 % (ref 20–50)
LDLC SERPL CALC-MCNC: 87 MG/DL (ref 63–159)
NONHDLC SERPL-MCNC: 102 MG/DL
TRIGL SERPL-MCNC: 75 MG/DL (ref 30–150)

## 2024-01-23 PROCEDURE — 80061 LIPID PANEL: CPT | Performed by: INTERNAL MEDICINE

## 2024-01-23 PROCEDURE — 82550 ASSAY OF CK (CPK): CPT | Performed by: INTERNAL MEDICINE

## 2024-01-23 PROCEDURE — 36415 COLL VENOUS BLD VENIPUNCTURE: CPT | Performed by: INTERNAL MEDICINE

## 2024-01-23 PROCEDURE — 84460 ALANINE AMINO (ALT) (SGPT): CPT | Performed by: INTERNAL MEDICINE

## 2024-01-23 NOTE — PROGRESS NOTES
Maureen Guerar  ED Navigator  Emergency Department    [place]: [facility] [place]  Role: Community Health Worker    Date: 01/23/2024  Patient Name: Ms. Manisha Wynne  MRN: [id number]  PCP: Iris Blue MD    Assessment:     Manisha Wynne is a 73 y.o. female who has presented to ED for fall. Patient has visited the ED 2 times in the past 3 months. Patient did not contact PCP.     ED Navigator Initial Assessment    ED Navigator Enrollment Documentation  Consent to Services  Does patient consent to completing the assessment?: Yes  Contact  Method of Initial Contact: Phone  Transportation  Does the patient have issues with Transportation?: No  Does the patient have transportation to and from healthcare appointments?: Yes  Insurance Coverage  Do you have coverage/adequate coverage?: Yes  Type/kind of coverage: Primary Cvg:  [employer]  Is patient able to afford co-pays/deductibles?: Yes  Is patient able to afford HME or supplies?: Yes  Does patient have an established [facility] PCP?: Yes  Able to access?: Yes  Does the patient have a lack of adequate coverage?: No  Specialist Appointment  Did the patient come to the ED to see a specialist?: No  Does the patient have a pending specialist referral?: No  Does the patient have a specialist appointment made?: No  PCP Follow Up Appointment  Has the patient had an appointment with a primary care provider in the past year?: Yes  Approximate date: 11/13/23  Provider: Iris Blue MD  Does the patient have a follow up appontment with a PCP?: No  When was the last time you saw your PCP?: 11/13/23  Why does the patient not have a follow up scheduled?: Other (see comments) (Comment: Declined appt)  Medications  Is patient able to afford medication?: Yes  Is patient unable to get medication due to lack of transportation?: No  Psychological  Does the patient have psycho-social concerns?: No  Food  Does the patient have concerns  about food?: No  Communication/Education  Does the patient have limited English proficiency/English not primary language?: No  Does patient have low literacy and/or low health literacy?: Yes  Does patient have concerns with care?: Yes  What concerns with care does the patient have?: Low priority placed on health  Does patient have dissatisfaction with care?: No  Other Financial Concerns  Does the patient have immediate financial distress?: No  Does the patient have general financial concerns?: No  Other Social Barriers/Concerns  Does the patient have any additional barriers or concerns?: None  Primary Barrier  Barriers identified: Patient identified no barriers to care  Root Cause of ED Utilization: Chronic Conditions  Next steps: Provided Education         Social History     Socioeconomic History    Marital status:    Occupational History    Occupation: /Rabbi     Employer: [employer]   Tobacco Use    Smoking status: Former     Current packs/day: 0.00     Average packs/day: 0.3 packs/day for 15.0 years (3.8 ttl pk-yrs)     Types: Cigarettes     Start date: 1967     Quit date: 1982     Years since quittin.5    Smokeless tobacco: Former   Substance and Sexual Activity    Alcohol use: Yes     Comment: no daily or heavy use, ~4 times per month    Drug use: No    Sexual activity: Not Currently     Partners: Male     Social Determinants of Health     Financial Resource Strain: Low Risk  (2023)    Overall Financial Resource Strain (CARDIA)     Difficulty of Paying Living Expenses: Not hard at all   Food Insecurity: No Food Insecurity (2023)    Hunger Vital Sign     Worried About Running Out of Food in the Last Year: Never true     Ran Out of Food in the Last Year: Never true   Transportation Needs: No Transportation Needs (2023)    PRAPARE - Transportation     Lack of Transportation (Medical): No     Lack of Transportation (Non-Medical): No   Physical  Activity: Insufficiently Active (11/12/2023)    Exercise Vital Sign     Days of Exercise per Week: 1 day     Minutes of Exercise per Session: 60 min   Stress: No Stress Concern Present (11/12/2023)    [employer] [place] [employer] - Occupational Stress Questionnaire     Feeling of Stress : Not at all   Social Connections: Moderately Integrated (11/12/2023)    Social Connection and Isolation Panel [NHANES]     Frequency of Communication with Friends and Family: Three times a week     Frequency of Social Gatherings with Friends and Family: Once a week     Attends Latter day Services: More than 4 times per year     Active Member of Clubs or Organizations: Yes     Attends Club or Organization Meetings: More than 4 times per year     Marital Status:    Housing Stability: Low Risk  (11/12/2023)    Housing Stability Vital Sign     Unable to Pay for Housing in the Last Year: No     Number of Places Lived in the Last Year: 2     Unstable Housing in the Last Year: No       Plan:         Appointment made with: Iris Blue MD

## 2024-01-24 ENCOUNTER — PATIENT MESSAGE (OUTPATIENT)
Dept: INTERNAL MEDICINE | Facility: CLINIC | Age: 74
End: 2024-01-24
Payer: MEDICARE

## 2024-01-30 ENCOUNTER — CLINICAL SUPPORT (OUTPATIENT)
Dept: CARDIOLOGY | Facility: HOSPITAL | Age: 74
End: 2024-01-30
Attending: INTERNAL MEDICINE
Payer: MEDICARE

## 2024-01-30 ENCOUNTER — OFFICE VISIT (OUTPATIENT)
Dept: ELECTROPHYSIOLOGY | Facility: CLINIC | Age: 74
End: 2024-01-30
Payer: MEDICARE

## 2024-01-30 ENCOUNTER — HOSPITAL ENCOUNTER (OUTPATIENT)
Dept: CARDIOLOGY | Facility: CLINIC | Age: 74
Discharge: HOME | End: 2024-01-30
Payer: MEDICARE

## 2024-01-30 VITALS
DIASTOLIC BLOOD PRESSURE: 70 MMHG | SYSTOLIC BLOOD PRESSURE: 118 MMHG | HEART RATE: 60 BPM | WEIGHT: 160.5 LBS | HEIGHT: 68 IN | BODY MASS INDEX: 24.32 KG/M2

## 2024-01-30 DIAGNOSIS — I48.91 ATRIAL FIBRILLATION, UNSPECIFIED TYPE: ICD-10-CM

## 2024-01-30 DIAGNOSIS — I70.0 AORTIC CALCIFICATION: ICD-10-CM

## 2024-01-30 DIAGNOSIS — I45.10 RBBB: ICD-10-CM

## 2024-01-30 DIAGNOSIS — I10 PRIMARY HYPERTENSION: Chronic | ICD-10-CM

## 2024-01-30 DIAGNOSIS — I48.0 PAROXYSMAL ATRIAL FIBRILLATION: ICD-10-CM

## 2024-01-30 DIAGNOSIS — G47.33 OSA (OBSTRUCTIVE SLEEP APNEA): ICD-10-CM

## 2024-01-30 DIAGNOSIS — I49.5 SSS (SICK SINUS SYNDROME): Primary | ICD-10-CM

## 2024-01-30 DIAGNOSIS — I77.89 OTHER SPECIFIED DISORDERS OF ARTERIES AND ARTERIOLES: ICD-10-CM

## 2024-01-30 DIAGNOSIS — Z74.09 IMPAIRED FUNCTIONAL MOBILITY, BALANCE, GAIT, AND ENDURANCE: ICD-10-CM

## 2024-01-30 PROCEDURE — 99214 OFFICE O/P EST MOD 30 MIN: CPT | Mod: HCNC,S$GLB,, | Performed by: INTERNAL MEDICINE

## 2024-01-30 PROCEDURE — 3074F SYST BP LT 130 MM HG: CPT | Mod: HCNC,CPTII,S$GLB, | Performed by: INTERNAL MEDICINE

## 2024-01-30 PROCEDURE — 3288F FALL RISK ASSESSMENT DOCD: CPT | Mod: HCNC,CPTII,S$GLB, | Performed by: INTERNAL MEDICINE

## 2024-01-30 PROCEDURE — 1126F AMNT PAIN NOTED NONE PRSNT: CPT | Mod: HCNC,CPTII,S$GLB, | Performed by: INTERNAL MEDICINE

## 2024-01-30 PROCEDURE — 1157F ADVNC CARE PLAN IN RCRD: CPT | Mod: HCNC,CPTII,S$GLB, | Performed by: INTERNAL MEDICINE

## 2024-01-30 PROCEDURE — 99999 PR PBB SHADOW E&M-EST. PATIENT-LVL III: CPT | Mod: PBBFAC,,, | Performed by: INTERNAL MEDICINE

## 2024-01-30 PROCEDURE — 1160F RVW MEDS BY RX/DR IN RCRD: CPT | Mod: HCNC,CPTII,S$GLB, | Performed by: INTERNAL MEDICINE

## 2024-01-30 PROCEDURE — 1101F PT FALLS ASSESS-DOCD LE1/YR: CPT | Mod: HCNC,CPTII,S$GLB, | Performed by: INTERNAL MEDICINE

## 2024-01-30 PROCEDURE — 93280 PM DEVICE PROGR EVAL DUAL: CPT | Mod: 26,,, | Performed by: INTERNAL MEDICINE

## 2024-01-30 PROCEDURE — 3078F DIAST BP <80 MM HG: CPT | Mod: HCNC,CPTII,S$GLB, | Performed by: INTERNAL MEDICINE

## 2024-01-30 PROCEDURE — 93010 ELECTROCARDIOGRAM REPORT: CPT | Mod: S$GLB,,, | Performed by: INTERNAL MEDICINE

## 2024-01-30 PROCEDURE — 93005 ELECTROCARDIOGRAM TRACING: CPT | Mod: S$GLB,,, | Performed by: INTERNAL MEDICINE

## 2024-01-30 PROCEDURE — 93280 PM DEVICE PROGR EVAL DUAL: CPT

## 2024-01-30 PROCEDURE — 1159F MED LIST DOCD IN RCRD: CPT | Mod: HCNC,CPTII,S$GLB, | Performed by: INTERNAL MEDICINE

## 2024-01-30 NOTE — PROGRESS NOTES
Subjective:   Patient ID:  Manisha Wynne is a 73 y.o. female who presents for follow-up of Atrial Fibrillation and Pacemaker Check  .   Primary Care Physician: Iris Blue MD    Prior Hx:  I had the pleasure of seeing Ms. Wynne in our electrophysiology clinic in follow-up for her pacemaker. As you are aware she is a pleasant 73 year-old woman with sick sinus syndrome, syncope and high-grade infrahisian block s/p dcPPM (Adam, 9/2012) by Dr. Whelan, hypertension, and CEDRIC here for follow-up.     She saw Svetlana Miner in clinic 5/2019. She reported she had recently experienced a fall while in [place] for a conference. She dislocated her finger, but head CT was negative. She was feeling fatigued and lost her balance as she stepped off the curb. She did not lose consciousness.  Device interrogation showed no arrhythmic episodes during period of her fall. Her last ECHO 1/2019 noted normal LVEF.    10/2019: She reports between January and May she had 3 falls. No syncope. She reports she has a sensory neuropathy and is falling with Neurology for this.     10/12/2020: She did have a mechanical fall in late January and broke her wrist. She was in a clinical trial for a balance device at [facility] but this has ended. Ms. Wynne is doing well from a device perspective with stable lead and device function. No RV pacing. No sustained arrhythmia noted. She is on the digital HTN program.  Continues to work with neurology on her balance issues. Otherwise she feels well.     10/2021: Saw Svetlana Miner. Ms. Wynne reports no chest pain with exertion or at rest, palpitations, SOB, SHEPARD, dizziness, or syncope. She is in the [facility] balance program. Has Apple Watch. No recent falls. She was supposed to have spinal surgery. Canceled due to c diff. Got it again and is on oral vanc.     10/2022: PPM gen change    Interim Hx:  Mrs. Wynne returns for follow-up. Having rare device detected AF (longest episode  1 hour in August 2023, asymptomatic, burden <1%). In-clinic PPM check notes 70% RA and 1% RV pacing, battery longevity 8.8-9.7 years. She still has falls (neuropathy in her feet). She also wakes up with what sounds like apneic episodes. She has CEDRIC but does not use her CPAP.    My interpretation of today's in-clinic ECG is A-paced rhythm at 60 bpm with RBBB.        Review of Systems   Constitutional: Negative for malaise/fatigue.   Cardiovascular:  Negative for dyspnea on exertion, irregular heartbeat and leg swelling.   Hematologic/Lymphatic: Negative for bleeding problem.   Skin:  Negative for rash.   Musculoskeletal:  Positive for falls. Negative for myalgias.   Gastrointestinal:  Negative for hematemesis, hematochezia and nausea.   Genitourinary:  Negative for hematuria.   Neurological:  Positive for paresthesias. Negative for light-headedness.   Psychiatric/Behavioral:  Negative for altered mental status.    Allergic/Immunologic: Negative for persistent infections.     Objective:        Physical Exam  Vitals reviewed.   Constitutional:       General: She is not in acute distress.     Appearance: Normal appearance. She is well-developed. She is not diaphoretic.   HENT:      Head: Normocephalic and atraumatic.   Eyes:      General:         Right eye: No discharge.         Left eye: No discharge.      Conjunctiva/sclera: Conjunctivae normal.   Cardiovascular:      Rate and Rhythm: Normal rate and regular rhythm.      Heart sounds: No murmur heard.     No friction rub. No gallop.   Pulmonary:      Effort: Pulmonary effort is normal. No respiratory distress.      Breath sounds: Normal breath sounds. No wheezing or rales.   Abdominal:      General: Bowel sounds are normal. There is no distension.      Palpations: Abdomen is soft.      Tenderness: There is no abdominal tenderness.   Musculoskeletal:      Cervical back: Neck supple.   Skin:     General: Skin is warm and dry.   Neurological:      Mental Status: She is  alert and oriented to person, place, and time. Mental status is at baseline.   Psychiatric:         Mood and Affect: Mood normal.         Behavior: Behavior normal.         Thought Content: Thought content normal.         Judgment: Judgment normal.           Assessment:     1. SSS (sick sinus syndrome)    2. Other specified disorders of arteries and arterioles    3. Primary hypertension    4. Paroxysmal atrial fibrillation    5. Aortic calcification    6. CEDRIC (obstructive sleep apnea)    7. Impaired functional mobility, balance, gait, and endurance      Plan:     In summary, Ms. Wynne is a pleasant 72 year-old female sick sinus syndrome, syncope and high-grade infrahisian block s/p dcPPM (9/2012) s/p gen change in 2022, hypertension, asymptomatic device detected AF and CEDRIC here for follow-up. Longest AF episodes was 1 hour. Asymptomatic. [place] is <1%. Would not start OAC at this point. She is a fall risk. Continue monitoring. If burden increases significantly or begins having long episodes (certainly over 6 hours) then will need to reconsider. Discussed wearing/using CPAP.    Thank you for allowing me to participate in the care of this patient. Please do not hesitate to call me with any questions or concerns.    John Sheets MD, PhD  Cardiac Electrophysiology

## 2024-01-31 LAB
OHS CV AF BURDEN PERCENT: < 1
OHS CV DC REMOTE DEVICE TYPE: NORMAL
OHS CV RV PACING PERCENT: 1 %

## 2024-02-20 ENCOUNTER — OFFICE VISIT (OUTPATIENT)
Dept: PODIATRY | Facility: CLINIC | Age: 74
End: 2024-02-20
Payer: MEDICARE

## 2024-02-20 VITALS
WEIGHT: 160 LBS | HEART RATE: 60 BPM | HEIGHT: 68 IN | RESPIRATION RATE: 17 BRPM | SYSTOLIC BLOOD PRESSURE: 120 MMHG | DIASTOLIC BLOOD PRESSURE: 76 MMHG | BODY MASS INDEX: 24.25 KG/M2

## 2024-02-20 DIAGNOSIS — M20.41 HAMMER TOES OF BOTH FEET: ICD-10-CM

## 2024-02-20 DIAGNOSIS — L84 PRE-ULCERATIVE CALLUSES: ICD-10-CM

## 2024-02-20 DIAGNOSIS — L60.0 INGROWN NAIL: Primary | ICD-10-CM

## 2024-02-20 DIAGNOSIS — M20.42 HAMMER TOES OF BOTH FEET: ICD-10-CM

## 2024-02-20 PROCEDURE — 1160F RVW MEDS BY RX/DR IN RCRD: CPT | Mod: CPTII,S$GLB,, | Performed by: PODIATRIST

## 2024-02-20 PROCEDURE — 1157F ADVNC CARE PLAN IN RCRD: CPT | Mod: CPTII,S$GLB,, | Performed by: PODIATRIST

## 2024-02-20 PROCEDURE — 1125F AMNT PAIN NOTED PAIN PRSNT: CPT | Mod: CPTII,S$GLB,, | Performed by: PODIATRIST

## 2024-02-20 PROCEDURE — 3078F DIAST BP <80 MM HG: CPT | Mod: CPTII,S$GLB,, | Performed by: PODIATRIST

## 2024-02-20 PROCEDURE — 3074F SYST BP LT 130 MM HG: CPT | Mod: CPTII,S$GLB,, | Performed by: PODIATRIST

## 2024-02-20 PROCEDURE — 99214 OFFICE O/P EST MOD 30 MIN: CPT | Mod: S$GLB,,, | Performed by: PODIATRIST

## 2024-02-20 PROCEDURE — 1159F MED LIST DOCD IN RCRD: CPT | Mod: CPTII,S$GLB,, | Performed by: PODIATRIST

## 2024-02-20 PROCEDURE — 99999 PR PBB SHADOW E&M-EST. PATIENT-LVL III: CPT | Mod: PBBFAC,,, | Performed by: PODIATRIST

## 2024-02-20 NOTE — PROGRESS NOTES
Subjective:      Patient ID: Manisha Wynne is a 73 y.o. female.    Chief Complaint:   Hammer Toe (Rt foot ), Nail Care, and Toe Pain    Manisha is a 73 y.o. female who presents to the clinic for evaluation and treatment of feet  Manisha has a past medical history of Abnormal Pap smear, Atrial fibrillation, AV block, 1st degree (07/25/2012), C. difficile diarrhea, Cataract, Depression (07/24/2012), Facet arthritis of lumbar region (03/31/2015), Falls, Hyperlipidemia, Hypertension (07/24/2012), Neuropathy, Other specified cardiac dysrhythmias(427.89), Sleep apnea, Syncope (07/24/2012), and Tremors of nervous system.  .  This patient has documented high risk feet requiring routine maintenance secondary to peripheral neuropathy.    Patient returns to clinic for foot evaluation.    Still using bandaids and occasional neosporin/lambswool  No bleeding  Wants big toenail checked... may have ingrown     Still considering returning to Dr. Ho for tenotomies of the other toes          PCP: Iris Blue MD    Date Last Seen by PCP: 11/14/23      Current shoe gear:  Affected Foot : bacilio Snyder        Hemoglobin A1C   Date Value Ref Range Status   11/16/2023 5.5 4.0 - 5.6 % Final     Comment:     ADA Screening Guidelines:  5.7-6.4%  Consistent with prediabetes  >or=6.5%  Consistent with diabetes    High levels of fetal hemoglobin interfere with the HbA1C  assay. Heterozygous hemoglobin variants (HbS, HgC, etc)do  not significantly interfere with this assay.   However, presence of multiple variants may affect accuracy.     08/05/2022 5.3 4.0 - 5.6 % Final     Comment:     ADA Screening Guidelines:  5.7-6.4%  Consistent with prediabetes  >or=6.5%  Consistent with diabetes    High levels of fetal hemoglobin interfere with the HbA1C  assay. Heterozygous hemoglobin variants (HbS, HgC, etc)do  not significantly interfere with this assay.   However, presence of multiple variants may affect accuracy.     06/10/2021  5.4 4.0 - 5.6 % Final     Comment:     ADA Screening Guidelines:  5.7-6.4%  Consistent with prediabetes  >or=6.5%  Consistent with diabetes    High levels of fetal hemoglobin interfere with the HbA1C  assay. Heterozygous hemoglobin variants (HbS, HgC, etc)do  not significantly interfere with this assay.   However, presence of multiple variants may affect accuracy.     06/10/2021 5.4 4.0 - 5.6 % Final     Comment:     ADA Screening Guidelines:  5.7-6.4%  Consistent with prediabetes  >or=6.5%  Consistent with diabetes    High levels of fetal hemoglobin interfere with the HbA1C  assay. Heterozygous hemoglobin variants (HbS, HgC, etc)do  not significantly interfere with this assay.   However, presence of multiple variants may affect accuracy.          Past Medical History:   Diagnosis Date    Abnormal Pap smear     Atrial fibrillation     AV block, 1st degree 07/25/2012    C. difficile diarrhea     RESOLVED    Cataract     Depression 07/24/2012    Facet arthritis of lumbar region 03/31/2015    Falls     3 falls in the last 6 mos--noted 6/19/19    Hyperlipidemia     Hypertension 07/24/2012    Neuropathy     Other specified cardiac dysrhythmias(427.89)     Sleep apnea     Syncope 07/24/2012    Tremors of nervous system     hands bilaterally     Past Surgical History:   Procedure Laterality Date    APPLICATION OF CARTILAGE GRAFT Bilateral 12/19/2019    Procedure: APPLICATION, CARTILAGE GRAFT AURICULAR JEZ;  Surgeon: Martinez Flores III, MD;  Location: [facility];  Service: ENT;  Laterality: Bilateral;    CARDIAC PACEMAKER PLACEMENT  09/07/2012    [id number]    CATARACT EXTRACTION W/  INTRAOCULAR LENS IMPLANT Right 5/16/2022    Procedure: EXTRACTION, CATARACT, WITH IOL INSERTION;  Surgeon: Ines Aguilera MD;  Location: [facility];  Service: Ophthalmology;  Laterality: Right;  Catalys     CATARACT EXTRACTION W/  INTRAOCULAR LENS IMPLANT Left 6/20/2022    Procedure: EXTRACTION, CATARACT, WITH IOL INSERTION;   Surgeon: Ines Aguilera MD;  Location: [facility] OR;  Service: Ophthalmology;  Laterality: Left;  Catalys     DILATION AND CURETTAGE OF UTERUS      Hx of precancerous cells?    ENDOSCOPIC ULTRASOUND OF UPPER GASTROINTESTINAL TRACT N/A 7/5/2019    Procedure: ULTRASOUND, UPPER GI TRACT, ENDOSCOPIC;  Surgeon: Kev Calderon MD;  Location: [facility] ENDO (2ND FLR);  Service: Endoscopy;  Laterality: N/A;  Pacemaker-Adam   appt confirmed-rb    MYELOGRAPHY N/A 5/4/2021    Procedure: Myelogram  CERVICAL, THORACIC AND LUMBAR;  Surgeon: [facility] Diagnostic Provider;  Location: [facility] 2ND FLR;  Service: Radiology;  Laterality: N/A;    NASAL SEPTOPLASTY N/A 12/19/2019    Procedure: SEPTOPLASTY, NOSE;  Surgeon: Martinez Flores III, MD;  Location: [facility] OR;  Service: ENT;  Laterality: N/A;  FOLLOW DR SALVATORE KIMBROUGH PROTOCOL    OPEN REDUCTION AND INTERNAL FIXATION (ORIF) OF FRACTURE OF DISTAL RADIUS Left 1/24/2020    Procedure: ORIF, FRACTURE, RADIUS, DISTAL-left;  Surgeon: Ellen Moe MD;  Location: [facility] OR;  Service: Orthopedics;  Laterality: Left;    POSTERIOR FUSION OF CERVICAL SPINE WITH LAMINECTOMY N/A 11/14/2022    Procedure: LAMINECTOMY, SPINE, CERVICAL, WITH POSTERIOR FUSION C3-C6;  Surgeon: Nicolette Cheek MD;  Location: [facility] 2ND FLR;  Service: Neurosurgery;  Laterality: N/A;  TORONTO III, ASA III, BLOOD TYPE AND SCREEN, NEUROMONITORING EMG SEP MEP, BRACE/HALO [place], BED REGULAR BED, HEADREST [place], POSITION PRONE, SPECIAL EQUIPMENT NIKI MIDDLETON, RADIOLOGY C-ARM, EXT. BONE STIMULATOR BIOMET    REPLACEMENT OF PACEMAKER GENERATOR Left 10/7/2022    Procedure: REPLACEMENT, PACEMAKER GENERATOR;  Surgeon: John Sheets MD;  Location: [facility] EP LAB;  Service: Cardiology;  Laterality: Left;  YAS, SSS, AVB, Dual PPM Gen Change,SJM, MAC ,MT, 3 prep,** Adam dcPPM in situ**    SURGICAL REMOVAL OF NASAL TURBINATE Bilateral 12/19/2019    Procedure: EXCISION, NASAL TURBINATE;  Surgeon: Martinez Flores III, MD;  Location: [facility]  OR;  Service: ENT;  Laterality: Bilateral;    TONSILLECTOMY      WISDOM TOOTH EXTRACTION       Current Outpatient Medications on File Prior to Visit   Medication Sig Dispense Refill    acetaminophen (TYLENOL) 500 MG tablet Take 2 tablets (1,000 mg total) by mouth every 8 (eight) hours. (Patient taking differently: Take 1,000 mg by mouth 3 (three) times daily as needed.)  0    aspirin (ADULT LOW DOSE ASPIRIN) 81 MG EC tablet       buPROPion (WELLBUTRIN XL) 300 MG 24 hr tablet Take 1 tablet by mouth once daily.      cyanocobalamin (VITAMIN B-12) 1000 MCG tablet Take 100 mcg by mouth once daily.      gabapentin (NEURONTIN) 100 MG capsule One capsule 2 (two) times daily. 180 capsule 3    lisdexamfetamine (VYVANSE) 70 MG capsule Take 1 capsule (70 mg total) by mouth every morning. 30 capsule 0    losartan (COZAAR) 25 MG tablet Take 1 tablet (25 mg total) by mouth once daily. 90 tablet 6    methyl salicylate-menthol 15-10% 15-10 % Crea Apply topically every evening. 113 g 0    mupirocin (BACTROBAN) 2 % ointment Apply topically 3 (three) times daily. 2 g 0    pravastatin (PRAVACHOL) 40 MG tablet Take 1 tablet (40 mg total) by mouth once daily. 90 tablet 3    PROLIA 60 mg/mL Syrg       calcium-vitamin D tablet 600 mg-200 units Take 1 tablet by mouth once daily. 30 tablet 0    FLUoxetine 40 MG capsule Take 1 capsule (40 mg total) by mouth once daily. 30 capsule 0    propranoloL (INDERAL) 20 MG tablet Take 1 tablet (20 mg total) by mouth 2 (two) times daily. 180 tablet 3     No current facility-administered medications on file prior to visit.     Review of patient's allergies indicates:  No Known Allergies    Review of Systems   Constitutional: Negative for chills, decreased appetite, fever, malaise/fatigue, night sweats, weight gain and weight loss.   Eyes:         Poor eyesight   Cardiovascular:  Negative for chest pain, claudication, dyspnea on exertion, leg swelling, palpitations and syncope.   Respiratory:  Negative for  "cough and shortness of breath.    Endocrine: Negative for cold intolerance and heat intolerance.   Hematologic/Lymphatic: Negative for bleeding problem. Does not bruise/bleed easily.   Skin:  Positive for nail changes. Negative for color change, dry skin, flushing, itching, poor wound healing, rash, skin cancer, suspicious lesions and unusual hair distribution.   Musculoskeletal:  Positive for arthritis and stiffness. Negative for back pain, falls, gout, joint pain, joint swelling, muscle cramps, muscle weakness, myalgias and neck pain.   Gastrointestinal:  Negative for diarrhea, nausea and vomiting.   Neurological:  Positive for numbness and paresthesias. Negative for dizziness, focal weakness, light-headedness, tremors, vertigo and weakness.   Psychiatric/Behavioral:  Negative for altered mental status and depression. The patient does not have insomnia.    Allergic/Immunologic: Negative.            Objective:       Vitals:    02/20/24 0815   BP: 120/76   Pulse: 60   Resp: 17   Weight: 72.6 kg (160 lb)   Height: 5' 8" (1.727 m)   PainSc:   2   PainLoc: Toe   72.6 kg (160 lb) afeb    Physical Exam  Vitals reviewed.   Constitutional:       General: She is not in acute distress.     Appearance: She is well-developed. She is not ill-appearing, toxic-appearing or diaphoretic.      Comments: Proper supportive shoegear   Cardiovascular:      Pulses:           Dorsalis pedis pulses are 2+ on the right side and 2+ on the left side.        Posterior tibial pulses are 1+ on the right side and 1+ on the left side.      Comments: No edema to digits  Musculoskeletal:         General: Deformity present.      Right lower leg: No edema.      Left lower leg: No edema.      Right foot: Decreased range of motion. Deformity and prominent metatarsal heads present. No tenderness or bony tenderness.      Left foot: Decreased range of motion. Deformity and prominent metatarsal heads present. No tenderness or bony tenderness.      Comments: "    Semi- Reducible extensor and flexor contractures at the MTPJ and PIPJ of toes 2-5, bilat.         Feet:      Right foot:      Protective Sensation: 10 sites tested.  0 sites sensed.      Skin integrity: No ulcer, blister, skin breakdown, erythema, warmth, callus or dry skin.      Toenail Condition: Right toenails are abnormally thick. Fungal disease present.     Left foot:      Protective Sensation: 10 sites tested.  0 sites sensed.      Skin integrity: Ulcer and skin breakdown present. No blister, erythema, warmth, callus or dry skin.      Toenail Condition: Left toenails are abnormally thick. Fungal disease present.     Comments:  No soi    Third digit  no open lesion     Pre-Ulcer location hemosiderin:  right, 2nd toe subungal distal              Skin:     General: Skin is warm and dry.      Capillary Refill: Capillary refill takes 2 to 3 seconds.      Coloration: Skin is not pale.      Findings: No erythema or rash.      Nails: There is clubbing.      Comments:          Neurological:      Mental Status: She is alert and oriented to person, place, and time.      Sensory: Sensory deficit present.      Motor: Atrophy present.      Gait: Gait abnormal.   Psychiatric:         Attention and Perception: Attention normal.         Mood and Affect: Mood normal.         Speech: Speech normal.         Thought Content: Thought content normal.         Cognition and Memory: Cognition normal.         Judgment: Judgment normal.                    Assessment:       Encounter Diagnoses   Name Primary?    Ingrown nail Yes    Pre-ulcerative calluses     Hammer toes of both feet                        Plan:       Manisha was seen today for hammer toe, nail care and toe pain.    Diagnoses and all orders for this visit:    Ingrown nail    Pre-ulcerative calluses    Hammer toes of both feet                    I counseled the patient on her conditions, their implications and medical management.         - Shoe inspection. Patient  instructed on proper foot hygeine. We discussed wearing proper shoe gear, daily foot inspections, never walking without protective shoe gear, never putting sharp instruments to feet, routine podiatric nail visits every 2-3 months.           - With patient's permission, the toenails mentioned above were aggressively reduced and debrided using a nail nipper, removing all offending nail and debris. Utilizing a #15 scalpel, I trimmed the corns and calluses at the above mentioned location.      The patient will continue to monitor the areas daily, inspect the feet, wear protective shoe gear when ambulatory, and moisturizer to maintain skin integrity.        Recommend vasaline or aquaphor    Re-  recommend tenotomy for 2nd, 4th and 5th  With Dr. Ho    Pt will call to schedule procedure with him after her conference trip **

## 2024-02-21 ENCOUNTER — HOSPITAL ENCOUNTER (OUTPATIENT)
Dept: RADIOLOGY | Facility: OTHER | Age: 74
Discharge: HOME | DRG: 563 | End: 2024-02-21
Attending: NEUROLOGICAL SURGERY
Payer: MEDICARE

## 2024-02-21 DIAGNOSIS — G95.9 CERVICAL MYELOPATHY: ICD-10-CM

## 2024-02-21 PROCEDURE — 72040 X-RAY EXAM NECK SPINE 2-3 VW: CPT | Mod: TC,FY

## 2024-02-21 PROCEDURE — 72040 X-RAY EXAM NECK SPINE 2-3 VW: CPT | Mod: 26,,, | Performed by: RADIOLOGY

## 2024-02-22 ENCOUNTER — HOSPITAL ENCOUNTER (INPATIENT)
Facility: HOSPITAL | Age: 74
LOS: 2 days | Discharge: HOME HEALTH | DRG: 563 | End: 2024-02-25
Attending: EMERGENCY MEDICINE | Admitting: HOSPITALIST
Payer: MEDICARE

## 2024-02-22 ENCOUNTER — OFFICE VISIT (OUTPATIENT)
Dept: NEUROSURGERY | Facility: CLINIC | Age: 74
End: 2024-02-22
Payer: MEDICARE

## 2024-02-22 VITALS
SYSTOLIC BLOOD PRESSURE: 111 MMHG | TEMPERATURE: 98 F | WEIGHT: 159.81 LBS | DIASTOLIC BLOOD PRESSURE: 69 MMHG | BODY MASS INDEX: 24.3 KG/M2 | HEART RATE: 64 BPM

## 2024-02-22 DIAGNOSIS — S01.81XA LACERATION OF FOREHEAD, INITIAL ENCOUNTER: ICD-10-CM

## 2024-02-22 DIAGNOSIS — M54.9 BACK PAIN, UNSPECIFIED BACK LOCATION, UNSPECIFIED BACK PAIN LATERALITY, UNSPECIFIED CHRONICITY: ICD-10-CM

## 2024-02-22 DIAGNOSIS — G95.9 CERVICAL MYELOPATHY: ICD-10-CM

## 2024-02-22 DIAGNOSIS — M25.521 RIGHT ELBOW PAIN: ICD-10-CM

## 2024-02-22 DIAGNOSIS — G62.9 NEUROPATHY: Primary | ICD-10-CM

## 2024-02-22 DIAGNOSIS — Z98.1 S/P CERVICAL SPINAL FUSION: ICD-10-CM

## 2024-02-22 DIAGNOSIS — R29.6 RECURRENT FALLS: ICD-10-CM

## 2024-02-22 DIAGNOSIS — S52.021A CLOSED FRACTURE OF OLECRANON PROCESS OF RIGHT ULNA, INITIAL ENCOUNTER: ICD-10-CM

## 2024-02-22 DIAGNOSIS — G95.9 CERVICAL MYELOPATHY: Primary | ICD-10-CM

## 2024-02-22 DIAGNOSIS — R07.9 CHEST PAIN: ICD-10-CM

## 2024-02-22 DIAGNOSIS — M25.531 WRIST PAIN, RIGHT: ICD-10-CM

## 2024-02-22 DIAGNOSIS — S01.01XA LACERATION OF SCALP, INITIAL ENCOUNTER: ICD-10-CM

## 2024-02-22 PROBLEM — L97.511 NON-PRESSURE CHRONIC ULCER OF OTHER PART OF RIGHT FOOT LIMITED TO BREAKDOWN OF SKIN: Status: ACTIVE | Noted: 2024-02-22

## 2024-02-22 PROBLEM — D69.6 THROMBOCYTOPENIA, UNSPECIFIED: Status: ACTIVE | Noted: 2024-02-22

## 2024-02-22 PROBLEM — I70.0 ATHEROSCLEROSIS OF AORTA: Status: ACTIVE | Noted: 2024-02-22

## 2024-02-22 PROBLEM — D89.89 OTHER SPECIFIED DISORDERS INVOLVING THE IMMUNE MECHANISM, NOT ELSEWHERE CLASSIFIED: Status: ACTIVE | Noted: 2024-02-22

## 2024-02-22 LAB
ALBUMIN SERPL BCP-MCNC: 4 G/DL (ref 3.5–5.2)
ALP SERPL-CCNC: 48 U/L (ref 55–135)
ALT SERPL W/O P-5'-P-CCNC: 21 U/L (ref 10–44)
ANION GAP SERPL CALC-SCNC: 9 MMOL/L (ref 8–16)
AST SERPL-CCNC: 22 U/L (ref 10–40)
BASOPHILS # BLD AUTO: 0.04 K/UL (ref 0–0.2)
BASOPHILS NFR BLD: 0.5 % (ref 0–1.9)
BILIRUB SERPL-MCNC: 1.3 MG/DL (ref 0.1–1)
BUN SERPL-MCNC: 22 MG/DL (ref 8–23)
CALCIUM SERPL-MCNC: 10 MG/DL (ref 8.7–10.5)
CHLORIDE SERPL-SCNC: 109 MMOL/L (ref 95–110)
CO2 SERPL-SCNC: 23 MMOL/L (ref 23–29)
CREAT SERPL-MCNC: 1 MG/DL (ref 0.5–1.4)
DIFFERENTIAL METHOD BLD: ABNORMAL
EOSINOPHIL # BLD AUTO: 0.3 K/UL (ref 0–0.5)
EOSINOPHIL NFR BLD: 3.2 % (ref 0–8)
ERYTHROCYTE [DISTWIDTH] IN BLOOD BY AUTOMATED COUNT: 13.2 % (ref 11.5–14.5)
EST. GFR  (NO RACE VARIABLE): 58.8 ML/MIN/1.73 M^2
GLUCOSE SERPL-MCNC: 91 MG/DL (ref 70–110)
HCT VFR BLD AUTO: 35 % (ref 37–48.5)
HCV AB SERPL QL IA: NORMAL
HGB BLD-MCNC: 11.8 G/DL (ref 12–16)
HIV 1+2 AB+HIV1 P24 AG SERPL QL IA: NORMAL
IMM GRANULOCYTES # BLD AUTO: 0.03 K/UL (ref 0–0.04)
IMM GRANULOCYTES NFR BLD AUTO: 0.4 % (ref 0–0.5)
LYMPHOCYTES # BLD AUTO: 2.2 K/UL (ref 1–4.8)
LYMPHOCYTES NFR BLD: 26.5 % (ref 18–48)
MAGNESIUM SERPL-MCNC: 2.2 MG/DL (ref 1.6–2.6)
MCH RBC QN AUTO: 30.6 PG (ref 27–31)
MCHC RBC AUTO-ENTMCNC: 33.7 G/DL (ref 32–36)
MCV RBC AUTO: 91 FL (ref 82–98)
MONOCYTES # BLD AUTO: 0.9 K/UL (ref 0.3–1)
MONOCYTES NFR BLD: 10.9 % (ref 4–15)
NEUTROPHILS # BLD AUTO: 4.8 K/UL (ref 1.8–7.7)
NEUTROPHILS NFR BLD: 58.5 % (ref 38–73)
NRBC BLD-RTO: 0 /100 WBC
PLATELET # BLD AUTO: 178 K/UL (ref 150–450)
PMV BLD AUTO: 10.7 FL (ref 9.2–12.9)
POTASSIUM SERPL-SCNC: 4.1 MMOL/L (ref 3.5–5.1)
PROT SERPL-MCNC: 7.1 G/DL (ref 6–8.4)
RBC # BLD AUTO: 3.85 M/UL (ref 4–5.4)
SODIUM SERPL-SCNC: 141 MMOL/L (ref 136–145)
TSH SERPL DL<=0.005 MIU/L-ACNC: 3.23 UIU/ML (ref 0.4–4)
WBC # BLD AUTO: 8.25 K/UL (ref 3.9–12.7)

## 2024-02-22 PROCEDURE — 87389 HIV-1 AG W/HIV-1&-2 AB AG IA: CPT | Performed by: EMERGENCY MEDICINE

## 2024-02-22 PROCEDURE — 3078F DIAST BP <80 MM HG: CPT | Mod: HCNC,CPTII,S$GLB, | Performed by: NEUROLOGICAL SURGERY

## 2024-02-22 PROCEDURE — 3288F FALL RISK ASSESSMENT DOCD: CPT | Mod: HCNC,CPTII,S$GLB, | Performed by: NEUROLOGICAL SURGERY

## 2024-02-22 PROCEDURE — 29125 APPL SHORT ARM SPLINT STATIC: CPT | Mod: HCNC,RT

## 2024-02-22 PROCEDURE — 83735 ASSAY OF MAGNESIUM: CPT | Performed by: EMERGENCY MEDICINE

## 2024-02-22 PROCEDURE — 99214 OFFICE O/P EST MOD 30 MIN: CPT | Mod: HCNC,S$GLB,, | Performed by: NEUROLOGICAL SURGERY

## 2024-02-22 PROCEDURE — 84443 ASSAY THYROID STIM HORMONE: CPT | Performed by: EMERGENCY MEDICINE

## 2024-02-22 PROCEDURE — 1159F MED LIST DOCD IN RCRD: CPT | Mod: HCNC,CPTII,S$GLB, | Performed by: NEUROLOGICAL SURGERY

## 2024-02-22 PROCEDURE — 1100F PTFALLS ASSESS-DOCD GE2>/YR: CPT | Mod: HCNC,CPTII,S$GLB, | Performed by: NEUROLOGICAL SURGERY

## 2024-02-22 PROCEDURE — 1125F AMNT PAIN NOTED PAIN PRSNT: CPT | Mod: HCNC,CPTII,S$GLB, | Performed by: NEUROLOGICAL SURGERY

## 2024-02-22 PROCEDURE — 3074F SYST BP LT 130 MM HG: CPT | Mod: HCNC,CPTII,S$GLB, | Performed by: NEUROLOGICAL SURGERY

## 2024-02-22 PROCEDURE — 80053 COMPREHEN METABOLIC PANEL: CPT | Performed by: EMERGENCY MEDICINE

## 2024-02-22 PROCEDURE — 25000003 PHARM REV CODE 250: Performed by: EMERGENCY MEDICINE

## 2024-02-22 PROCEDURE — 86803 HEPATITIS C AB TEST: CPT | Performed by: EMERGENCY MEDICINE

## 2024-02-22 PROCEDURE — 99999 PR PBB SHADOW E&M-EST. PATIENT-LVL IV: CPT | Mod: PBBFAC,,, | Performed by: NEUROLOGICAL SURGERY

## 2024-02-22 PROCEDURE — 99285 EMERGENCY DEPT VISIT HI MDM: CPT | Mod: 25,27,HCNC

## 2024-02-22 PROCEDURE — 12013 RPR F/E/E/N/L/M 2.6-5.0 CM: CPT | Mod: HCNC

## 2024-02-22 PROCEDURE — 93005 ELECTROCARDIOGRAM TRACING: CPT

## 2024-02-22 PROCEDURE — 93010 ELECTROCARDIOGRAM REPORT: CPT | Mod: ,,, | Performed by: INTERNAL MEDICINE

## 2024-02-22 PROCEDURE — 85025 COMPLETE CBC W/AUTO DIFF WBC: CPT | Performed by: EMERGENCY MEDICINE

## 2024-02-22 PROCEDURE — 94761 N-INVAS EAR/PLS OXIMETRY MLT: CPT

## 2024-02-22 PROCEDURE — 1157F ADVNC CARE PLAN IN RCRD: CPT | Mod: HCNC,CPTII,S$GLB, | Performed by: NEUROLOGICAL SURGERY

## 2024-02-22 RX ORDER — LIDOCAINE HYDROCHLORIDE 10 MG/ML
10 INJECTION, SOLUTION EPIDURAL; INFILTRATION; INTRACAUDAL; PERINEURAL
Status: COMPLETED | OUTPATIENT
Start: 2024-02-22 | End: 2024-02-22

## 2024-02-22 RX ADMIN — LIDOCAINE HYDROCHLORIDE 100 MG: 10 SOLUTION INTRAVENOUS at 09:02

## 2024-02-22 NOTE — PROGRESS NOTES
"  Neurosurgery  Follow up     SUBJECTIVE:     Chief Complaint: "here for neck evaluation"     History of Present Illness:  Manisha Wynne is a 73 y.o. female (former  at [employer] [facility] [place]) who presents as a referral from Dr. Mcduffie for evaluation of cervical myelopathy. The patient reports that she has had several falls from [id number], which prompted her to seek neurologic evaluation. The falls have resulted in injuries, including breaking her wrist. She also has numbness, tingling, and burning in her feet. She denies pain in her neck, but endorses in her mid- and low back. Her pain is 10/10 in the legs, 10/10 in the back, and 0/10 in the neck and arms. The pain is intermittent. It is made worse by sitting. It improves with standing. She denies any change in bowel/bladder habit.     She endorses handwriting changes, dropping objects, and balance/gait difficulty. She denies fine motor changes. She has not seen pain management for her neck, but she is seeing Dr. Booker for "sciatica."     CT of the cervical spine (the patient has a non-MRI-conditional compatible pacemaker in place) was obtained and personally reviewed, demonstrating significant stenosis at C5-C6>C4-C5. Flex ex shows some mild instability of C3 on C4.     She lives alone with her tuxedo cats. Her family is in [place] and [facility]. She does have friends nearby who will help to care for her in perioperative periods. She takes ASA 81 daily.     As of 5/6/21, the patient had the myelogram as requested. This is personally reviewed and demonstrates significant cord compression and OPLL. There is some stenosis at L4-L5 as well.     The patient reports that she has been well. She has had no falls. She has been doing PT for her sciatica, which she has found helpful. She does note that her tremor, which may be progressing slightly, is now interfering with her applying makeup at times.     As of 5/25/21, the patient reports that she has not had any significant " changes. She presents today with a list of questions, together with friend Kavya Roberts on the phone. She also endorses a several year history of urinary incontinence (episodes occur overnight or with coughing) for which she was going to participate in pelvic floor rehab and see urogyn; however, this was interrupted by Covid.     As of 1/11/22, the patient reports that she has had ongoing C. Diff since we scheduled surgery. She was seen about a potential colonoscopy today, but was advised against proceeding. She is due for a CT of the abdomen/pelvis next week to look at the pancreas. She is also being considered for a fecal transplant. There is also a clinical trial going on in which she's interested.     Dr. Maradiaga of ID has been quarterbacking the C. Diff response. The patient is frustrated by her lack of improvement.     She is less fatigued now than when she initially became sick, but she does not feel she has yet recovered to her baseline. She is also being recommended for cataract surgery and pacemaker generator replacement in about 13 months.     As of 8/4/22, the patient reports that she is finally over her C. Diff. 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 She notes that she has been really careful. She walks very slowly. She has noticed some tremor in her hands at rest. This gets better when she holds onto an object. She denies anosmia. She denies significant sleep difficulties and other non-motor symptoms of PD.     As of 12/22/22, the patient underwent C3-C6 posterior cervical decompression and fusion on 11/14/22. She reports that she has been doing well since surgery. She has NOT been using a bone growth stimulator. She denies fever, chills, or drainage from the incision. She has no complaints of C. Diff. She is eager to return to work in the 's office.     As of 2/2/23, the patient returns in scheduled follow-up. She reports she has been well. She is walking 6000  steps several times a week. She has returned to work, though she has been a bit more forgetful about dates and times. We discussed Dr. Cordero.     She saw Sunitha this morning, who is starting outpatient PT.     Bone growth stimulator was received, and she has been using it.     She reports that she had a mechanical trip and fall in which her head didn't hit the ground last week.     As of 5/4/23, the patient returns in scheduled postoperative follow up. She reports that she has one additional month of PT.     She reports that she has had 3 falls since we saw each other last, but all of them have been mechanical. One she slipped on water, one she was getting up from the floor, once when walking on uneven ground (sidewalk was dug up). She notes that she moved about a month ago.      She occasionally has headaches. She has occasional neck pain in the left upper portion, but this has been improving. The cats have been well. She is back at work 2 days a week.     As of 2/22/24, the patient returns in scheduled follow-up. Overall, she has been well, though she is still falling (most recently in December). She sustained a head laceration after a fall at [facility] in November that now appears well healed.     She requests a referral to neurology for her ongoing neuropathy.     She is also complaining of low back pain with sciatica. She denies any new weakness associated with this and is interested in doing [facility].     Propanolol has been helping her tremor.      Review of patient's allergies indicates:  No Known Allergies    Current Outpatient Medications   Medication Sig Dispense Refill    acetaminophen (TYLENOL) 500 MG tablet Take 2 tablets (1,000 mg total) by mouth every 8 (eight) hours. (Patient taking differently: Take 1,000 mg by mouth 3 (three) times daily as needed.)  0    aspirin (ADULT LOW DOSE ASPIRIN) 81 MG EC tablet       buPROPion (WELLBUTRIN XL) 300 MG 24 hr tablet Take 1 tablet by mouth once daily.       calcium-vitamin D tablet 600 mg-200 units Take 1 tablet by mouth once daily. 30 tablet 0    cyanocobalamin (VITAMIN B-12) 1000 MCG tablet Take 100 mcg by mouth once daily.      FLUoxetine 40 MG capsule Take 1 capsule (40 mg total) by mouth once daily. 30 capsule 0    gabapentin (NEURONTIN) 100 MG capsule One capsule 2 (two) times daily. 180 capsule 3    lisdexamfetamine (VYVANSE) 70 MG capsule Take 1 capsule (70 mg total) by mouth every morning. 30 capsule 0    losartan (COZAAR) 25 MG tablet Take 1 tablet (25 mg total) by mouth once daily. 90 tablet 6    methyl salicylate-menthol 15-10% 15-10 % Crea Apply topically every evening. 113 g 0    mupirocin (BACTROBAN) 2 % ointment Apply topically 3 (three) times daily. 2 g 0    pravastatin (PRAVACHOL) 40 MG tablet Take 1 tablet (40 mg total) by mouth once daily. 90 tablet 3    PROLIA 60 mg/mL Syrg       propranoloL (INDERAL) 20 MG tablet Take 1 tablet (20 mg total) by mouth 2 (two) times daily. 180 tablet 3     No current facility-administered medications for this visit.       Past Medical History:   Diagnosis Date    Abnormal Pap smear     Atrial fibrillation     AV block, 1st degree 07/25/2012    C. difficile diarrhea     RESOLVED    Cataract     Depression 07/24/2012    Facet arthritis of lumbar region 03/31/2015    Falls     3 falls in the last 6 mos--noted 6/19/19    Hyperlipidemia     Hypertension 07/24/2012    Neuropathy     Other specified cardiac dysrhythmias(427.89)     Sleep apnea     Syncope 07/24/2012    Tremors of nervous system     hands bilaterally     Past Surgical History:   Procedure Laterality Date    APPLICATION OF CARTILAGE GRAFT Bilateral 12/19/2019    Procedure: APPLICATION, CARTILAGE GRAFT AURICULAR JEZ;  Surgeon: Martinez Flores III, MD;  Location: [facility];  Service: ENT;  Laterality: Bilateral;    CARDIAC PACEMAKER PLACEMENT  09/07/2012    [id number]    CATARACT EXTRACTION W/  INTRAOCULAR LENS IMPLANT Right 5/16/2022     Procedure: EXTRACTION, CATARACT, WITH IOL INSERTION;  Surgeon: Ines Aguilera MD;  Location: [facility];  Service: Ophthalmology;  Laterality: Right;  Catalys     CATARACT EXTRACTION W/  INTRAOCULAR LENS IMPLANT Left 6/20/2022    Procedure: EXTRACTION, CATARACT, WITH IOL INSERTION;  Surgeon: Ines Aguilera MD;  Location: [facility];  Service: Ophthalmology;  Laterality: Left;  Catalys     DILATION AND CURETTAGE OF UTERUS      Hx of precancerous cells?    ENDOSCOPIC ULTRASOUND OF UPPER GASTROINTESTINAL TRACT N/A 7/5/2019    Procedure: ULTRASOUND, UPPER GI TRACT, ENDOSCOPIC;  Surgeon: Kev Calderon MD;  Location: [facility] ENDO (2ND FLR);  Service: Endoscopy;  Laterality: N/A;  Pacemaker-Aadm   appt confirmed-rb    MYELOGRAPHY N/A 5/4/2021    Procedure: Myelogram  CERVICAL, THORACIC AND LUMBAR;  Surgeon: [facility] Diagnostic Provider;  [address];  Service: Radiology;  Laterality: N/A;    NASAL SEPTOPLASTY N/A 12/19/2019    Procedure: SEPTOPLASTY, NOSE;  Surgeon: Martinez Flores III, MD;  Location: [facility];  Service: ENT;  Laterality: N/A;  FOLLOW DR SALVATORE KIMBROUGH PROTOCOL    OPEN REDUCTION AND INTERNAL FIXATION (ORIF) OF FRACTURE OF DISTAL RADIUS Left 1/24/2020    Procedure: ORIF, FRACTURE, RADIUS, DISTAL-left;  Surgeon: Ellen Moe MD;  Location: [facility];  Service: Orthopedics;  Laterality: Left;    POSTERIOR FUSION OF CERVICAL SPINE WITH LAMINECTOMY N/A 11/14/2022    Procedure: LAMINECTOMY, SPINE, CERVICAL, WITH POSTERIOR FUSION C3-C6;  Surgeon: Nicolette Cheek MD;  [address];  Service: Neurosurgery;  Laterality: N/A;  TORONTO III, ASA III, BLOOD TYPE AND SCREEN, NEUROMONITORING EMG SEP MEP, BRACE/HALO [place], BED REGULAR BED, HEADREST [place], POSITION PRONE, SPECIAL EQUIPMENT NIKI [place], RADIOLOGY C-ARM, EXT. BONE STIMULATOR BIOMET    REPLACEMENT OF PACEMAKER GENERATOR Left 10/7/2022    Procedure: REPLACEMENT, PACEMAKER GENERATOR;  Surgeon: John Sheets MD;  Location: [facility]  EP LAB;  Service: Cardiology;  Laterality: Left;  YAS, SSS, AVB, Dual PPM Gen Change,SJM, MAC ,UT, 3 prep,** Adam dcPPM in situ**    SURGICAL REMOVAL OF NASAL TURBINATE Bilateral 2019    Procedure: EXCISION, NASAL TURBINATE;  Surgeon: Martinez Flores III, MD;  Location: [facility];  Service: ENT;  Laterality: Bilateral;    TONSILLECTOMY      WISDOM TOOTH EXTRACTION       Family History    None       Social History     Socioeconomic History    Marital status:    Occupational History    Occupation: /[employer]     Employer: [employer]   Tobacco Use    Smoking status: Former     Current packs/day: 0.00     Average packs/day: 0.3 packs/day for 15.0 years (3.8 ttl pk-yrs)     Types: Cigarettes     Start date: 1967     Quit date: 1982     Years since quittin.6    Smokeless tobacco: Former   Substance and Sexual Activity    Alcohol use: Yes     Comment: no daily or heavy use, ~4 times per month    Drug use: No    Sexual activity: Not Currently     Partners: Male     Social Determinants of Health     Financial Resource Strain: Low Risk  (2024)    Overall Financial Resource Strain (CARDIA)     Difficulty of Paying Living Expenses: Not very hard   Food Insecurity: No Food Insecurity (2024)    Hunger Vital Sign     Worried About Running Out of Food in the Last Year: Never true     Ran Out of Food in the Last Year: Never true   Transportation Needs: No Transportation Needs (2024)    PRAPARE - Transportation     Lack of Transportation (Medical): No     Lack of Transportation (Non-Medical): No   Physical Activity: Insufficiently Active (2024)    Exercise Vital Sign     Days of Exercise per Week: 1 day     Minutes of Exercise per Session: 60 min   Stress: No Stress Concern Present (2024)    [place] [employer] - Occupational Stress Questionnaire     Feeling of Stress : Only a little   Social Connections: Moderately Integrated (2024)     Social Connection and Isolation Panel [NHANES]     Frequency of Communication with Friends and Family: Three times a week     Frequency of Social Gatherings with Friends and Family: Once a week     Attends Protestant Services: More than 4 times per year     Active Member of Clubs or Organizations: Yes     Attends Club or Organization Meetings: More than 4 times per year     Marital Status:    Housing Stability: Low Risk  (1/29/2024)    Housing Stability Vital Sign     Unable to Pay for Housing in the Last Year: No     Number of Places Lived in the Last Year: 2     Unstable Housing in the Last Year: No       Review of Systems   Constitutional: Negative for fever.        Weight loss   HENT: Negative for nosebleeds.    Eyes: Positive for visual disturbance.   Respiratory: Negative for shortness of breath.    Cardiovascular: Negative for chest pain.   Gastrointestinal: Negative for constipation.   Endocrine: Negative for cold intolerance.   Genitourinary: Negative for difficulty urinating.   Musculoskeletal: Positive for back pain and gait problem. Negative for neck pain.   Skin: Negative for color change.   Neurological: Negative for headaches.   Hematological: Does not bruise/bleed easily.   Psychiatric/Behavioral: Positive for dysphoric mood. The patient is nervous/anxious.        OBJECTIVE:     Vital Signs  Temp: 98 °F (36.7 °C)  Pulse: 64  BP: 111/69  Pain Score:   5  Weight: 72.5 kg (159 lb 13.3 oz)  Body mass index is 24.3 kg/m².    Physical Exam:     Constitutional: She appears well-developed and well-nourished. No distress.      Eyes: EOM are normal.      Abdominal: Soft.      Skin: Skin displays no rash on extremities. Skin displays no lesions on extremities.     Psych/Behavior: She is alert. She is oriented to person, place, and time.     Musculoskeletal:      Comments: Guarded, myelopathic gait  Hand intrinsics 5/5 bilaterally   Biceps 5    Decreased arm swing on right     Neurological:         Coordination: She has normal finger to nose coordination.   + Maria's bilaterally   Tremulous with R>L resting tremor      Pulmonary: symmetric bilaterally     Diagnostic Results:  CT personally reviewed     ASSESSMENT/PLAN:     Manisha Wynne is a 73 y.o. female with cervical stenosis and myelopathy. She is now s/p C3-C6 laminectomy and fusion on 11/14/22.     She is doing well overall. She has been careful about not over-exerting herself. She has been compliant with use of the bone growth stimulator.     We discussed her CT findings; since she is completely asymptomatic, we will observe for now. She is not interested in pursuing a myelogram.     I will place a referral to neurology for ongoing neuropathy management since Dr. Mcduffie left. She requests Dr. Joyner.     I will also order lumbar flex/ex Xrays to evaluate for dynamic instability of the low back and refer her to [facility]. If she would like further workup, she will contact me, and I will be happy to see her back at any time.     I have encouraged her to contact the clinic in interim with any questions, concerns, or adverse clinical change.

## 2024-02-23 PROBLEM — S52.021A CLOSED FRACTURE OF OLECRANON PROCESS OF RIGHT ULNA: Status: ACTIVE | Noted: 2024-02-23

## 2024-02-23 PROBLEM — G95.9 CERVICAL MYELOPATHY: Status: ACTIVE | Noted: 2024-02-23
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
## 2024-02-23 LAB
ALBUMIN SERPL BCP-MCNC: 3.6 G/DL (ref 3.5–5.2)
ALP SERPL-CCNC: 46 U/L (ref 55–135)
ALT SERPL W/O P-5'-P-CCNC: 17 U/L (ref 10–44)
ANION GAP SERPL CALC-SCNC: 9 MMOL/L (ref 8–16)
AST SERPL-CCNC: 20 U/L (ref 10–40)
BASOPHILS # BLD AUTO: 0.04 K/UL (ref 0–0.2)
BASOPHILS NFR BLD: 0.4 % (ref 0–1.9)
BILIRUB SERPL-MCNC: 1.4 MG/DL (ref 0.1–1)
BILIRUB UR QL STRIP: NEGATIVE
BUN SERPL-MCNC: 20 MG/DL (ref 8–23)
CALCIUM SERPL-MCNC: 9.4 MG/DL (ref 8.7–10.5)
CHLORIDE SERPL-SCNC: 108 MMOL/L (ref 95–110)
CLARITY UR REFRACT.AUTO: CLEAR
CO2 SERPL-SCNC: 22 MMOL/L (ref 23–29)
COLOR UR AUTO: YELLOW
CREAT SERPL-MCNC: 0.9 MG/DL (ref 0.5–1.4)
DIFFERENTIAL METHOD BLD: ABNORMAL
EOSINOPHIL # BLD AUTO: 0.2 K/UL (ref 0–0.5)
EOSINOPHIL NFR BLD: 1.6 % (ref 0–8)
ERYTHROCYTE [DISTWIDTH] IN BLOOD BY AUTOMATED COUNT: 13.2 % (ref 11.5–14.5)
EST. GFR  (NO RACE VARIABLE): >60 ML/MIN/1.73 M^2
GLUCOSE SERPL-MCNC: 102 MG/DL (ref 70–110)
GLUCOSE UR QL STRIP: NEGATIVE
HCT VFR BLD AUTO: 31.6 % (ref 37–48.5)
HGB BLD-MCNC: 10.7 G/DL (ref 12–16)
HGB UR QL STRIP: NEGATIVE
IMM GRANULOCYTES # BLD AUTO: 0.03 K/UL (ref 0–0.04)
IMM GRANULOCYTES NFR BLD AUTO: 0.3 % (ref 0–0.5)
KETONES UR QL STRIP: NEGATIVE
LEUKOCYTE ESTERASE UR QL STRIP: NEGATIVE
LYMPHOCYTES # BLD AUTO: 1.8 K/UL (ref 1–4.8)
LYMPHOCYTES NFR BLD: 16.7 % (ref 18–48)
MAGNESIUM SERPL-MCNC: 2.1 MG/DL (ref 1.6–2.6)
MCH RBC QN AUTO: 30.7 PG (ref 27–31)
MCHC RBC AUTO-ENTMCNC: 33.9 G/DL (ref 32–36)
MCV RBC AUTO: 91 FL (ref 82–98)
MONOCYTES # BLD AUTO: 1 K/UL (ref 0.3–1)
MONOCYTES NFR BLD: 9.5 % (ref 4–15)
NEUTROPHILS # BLD AUTO: 7.5 K/UL (ref 1.8–7.7)
NEUTROPHILS NFR BLD: 71.5 % (ref 38–73)
NITRITE UR QL STRIP: NEGATIVE
NRBC BLD-RTO: 0 /100 WBC
OHS QRS DURATION: 132 MS
OHS QTC CALCULATION: 466 MS
PH UR STRIP: 5 [PH] (ref 5–8)
PHOSPHATE SERPL-MCNC: 3.1 MG/DL (ref 2.7–4.5)
PLATELET # BLD AUTO: 109 K/UL (ref 150–450)
PMV BLD AUTO: 12.1 FL (ref 9.2–12.9)
POTASSIUM SERPL-SCNC: 3.9 MMOL/L (ref 3.5–5.1)
PROT SERPL-MCNC: 6.5 G/DL (ref 6–8.4)
PROT UR QL STRIP: NEGATIVE
RBC # BLD AUTO: 3.49 M/UL (ref 4–5.4)
SODIUM SERPL-SCNC: 139 MMOL/L (ref 136–145)
SP GR UR STRIP: 1.01 (ref 1–1.03)
URN SPEC COLLECT METH UR: NORMAL
WBC # BLD AUTO: 10.48 K/UL (ref 3.9–12.7)

## 2024-02-23 PROCEDURE — 25000003 PHARM REV CODE 250: Mod: HCNC

## 2024-02-23 PROCEDURE — 99223 1ST HOSP IP/OBS HIGH 75: CPT | Mod: HCNC,,, | Performed by: PHYSICIAN ASSISTANT

## 2024-02-23 PROCEDURE — 84100 ASSAY OF PHOSPHORUS: CPT

## 2024-02-23 PROCEDURE — 83735 ASSAY OF MAGNESIUM: CPT

## 2024-02-23 PROCEDURE — 80053 COMPREHEN METABOLIC PANEL: CPT

## 2024-02-23 PROCEDURE — 21400001 HC TELEMETRY ROOM: Mod: HCNC

## 2024-02-23 PROCEDURE — 85025 COMPLETE CBC W/AUTO DIFF WBC: CPT

## 2024-02-23 PROCEDURE — 0HQ1XZZ REPAIR FACE SKIN, EXTERNAL APPROACH: ICD-10-PCS | Performed by: EMERGENCY MEDICINE

## 2024-02-23 PROCEDURE — 97161 PT EVAL LOW COMPLEX 20 MIN: CPT | Mod: HCNC

## 2024-02-23 PROCEDURE — 97530 THERAPEUTIC ACTIVITIES: CPT | Mod: HCNC

## 2024-02-23 PROCEDURE — 25000003 PHARM REV CODE 250

## 2024-02-23 PROCEDURE — 97535 SELF CARE MNGMENT TRAINING: CPT | Mod: HCNC

## 2024-02-23 PROCEDURE — 81003 URINALYSIS AUTO W/O SCOPE: CPT | Mod: HCNC | Performed by: EMERGENCY MEDICINE

## 2024-02-23 PROCEDURE — 97165 OT EVAL LOW COMPLEX 30 MIN: CPT | Mod: HCNC

## 2024-02-23 RX ORDER — SODIUM CHLORIDE 0.9 % (FLUSH) 0.9 %
10 SYRINGE (ML) INJECTION
Status: DISCONTINUED | OUTPATIENT
Start: 2024-02-23 | End: 2024-02-25 | Stop reason: HOSPADM

## 2024-02-23 RX ORDER — METHOCARBAMOL 500 MG/1
500 TABLET, FILM COATED ORAL 4 TIMES DAILY
Status: DISCONTINUED | OUTPATIENT
Start: 2024-02-23 | End: 2024-02-25 | Stop reason: HOSPADM

## 2024-02-23 RX ORDER — BISACODYL 10 MG/1
10 SUPPOSITORY RECTAL DAILY PRN
Status: DISCONTINUED | OUTPATIENT
Start: 2024-02-23 | End: 2024-02-25 | Stop reason: HOSPADM

## 2024-02-23 RX ORDER — IBUPROFEN 200 MG
16 TABLET ORAL
Status: DISCONTINUED | OUTPATIENT
Start: 2024-02-23 | End: 2024-02-25 | Stop reason: HOSPADM

## 2024-02-23 RX ORDER — PROPRANOLOL HYDROCHLORIDE 10 MG/1
20 TABLET ORAL 2 TIMES DAILY
Status: DISCONTINUED | OUTPATIENT
Start: 2024-02-23 | End: 2024-02-25 | Stop reason: HOSPADM

## 2024-02-23 RX ORDER — GABAPENTIN 100 MG/1
100 CAPSULE ORAL 2 TIMES DAILY
Status: DISCONTINUED | OUTPATIENT
Start: 2024-02-23 | End: 2024-02-25 | Stop reason: HOSPADM

## 2024-02-23 RX ORDER — ACETAMINOPHEN 325 MG/1
650 TABLET ORAL EVERY 4 HOURS PRN
Status: DISCONTINUED | OUTPATIENT
Start: 2024-02-23 | End: 2024-02-23

## 2024-02-23 RX ORDER — TALC
6 POWDER (GRAM) TOPICAL NIGHTLY PRN
Status: DISCONTINUED | OUTPATIENT
Start: 2024-02-23 | End: 2024-02-25 | Stop reason: HOSPADM

## 2024-02-23 RX ORDER — IPRATROPIUM BROMIDE AND ALBUTEROL SULFATE 2.5; .5 MG/3ML; MG/3ML
3 SOLUTION RESPIRATORY (INHALATION) EVERY 4 HOURS PRN
Status: DISCONTINUED | OUTPATIENT
Start: 2024-02-23 | End: 2024-02-25 | Stop reason: HOSPADM

## 2024-02-23 RX ORDER — POLYETHYLENE GLYCOL 3350 17 G/17G
17 POWDER, FOR SOLUTION ORAL DAILY PRN
Status: DISCONTINUED | OUTPATIENT
Start: 2024-02-23 | End: 2024-02-25 | Stop reason: HOSPADM

## 2024-02-23 RX ORDER — FLUOXETINE HYDROCHLORIDE 20 MG/1
40 CAPSULE ORAL DAILY
Status: DISCONTINUED | OUTPATIENT
Start: 2024-02-23 | End: 2024-02-25 | Stop reason: HOSPADM

## 2024-02-23 RX ORDER — OXYCODONE HYDROCHLORIDE 5 MG/1
5 TABLET ORAL EVERY 6 HOURS PRN
Status: DISCONTINUED | OUTPATIENT
Start: 2024-02-23 | End: 2024-02-25 | Stop reason: HOSPADM

## 2024-02-23 RX ORDER — ONDANSETRON 8 MG/1
8 TABLET, ORALLY DISINTEGRATING ORAL EVERY 8 HOURS PRN
Status: DISCONTINUED | OUTPATIENT
Start: 2024-02-23 | End: 2024-02-25 | Stop reason: HOSPADM

## 2024-02-23 RX ORDER — PROMETHAZINE HYDROCHLORIDE 25 MG/1
25 TABLET ORAL EVERY 6 HOURS PRN
Status: DISCONTINUED | OUTPATIENT
Start: 2024-02-23 | End: 2024-02-25 | Stop reason: HOSPADM

## 2024-02-23 RX ORDER — IBUPROFEN 200 MG
24 TABLET ORAL
Status: DISCONTINUED | OUTPATIENT
Start: 2024-02-23 | End: 2024-02-25 | Stop reason: HOSPADM

## 2024-02-23 RX ORDER — ACETAMINOPHEN 500 MG
1000 TABLET ORAL 3 TIMES DAILY
Status: DISCONTINUED | OUTPATIENT
Start: 2024-02-23 | End: 2024-02-25 | Stop reason: HOSPADM

## 2024-02-23 RX ORDER — GLUCAGON 1 MG
1 KIT INJECTION
Status: DISCONTINUED | OUTPATIENT
Start: 2024-02-23 | End: 2024-02-25 | Stop reason: HOSPADM

## 2024-02-23 RX ORDER — PRAVASTATIN SODIUM 40 MG/1
40 TABLET ORAL DAILY
Status: DISCONTINUED | OUTPATIENT
Start: 2024-02-23 | End: 2024-02-25 | Stop reason: HOSPADM

## 2024-02-23 RX ORDER — BUPROPION HYDROCHLORIDE 300 MG/1
300 TABLET ORAL DAILY
Status: DISCONTINUED | OUTPATIENT
Start: 2024-02-23 | End: 2024-02-25 | Stop reason: HOSPADM

## 2024-02-23 RX ORDER — ACETAMINOPHEN 500 MG
1 TABLET ORAL NIGHTLY
COMMUNITY

## 2024-02-23 RX ORDER — NAPROXEN SODIUM 220 MG
440 TABLET ORAL NIGHTLY PRN
Status: ON HOLD | COMMUNITY
End: 2024-02-25 | Stop reason: HOSPADM

## 2024-02-23 RX ORDER — LOSARTAN POTASSIUM 25 MG/1
25 TABLET ORAL DAILY
Status: DISCONTINUED | OUTPATIENT
Start: 2024-02-23 | End: 2024-02-25 | Stop reason: HOSPADM

## 2024-02-23 RX ADMIN — PROPRANOLOL HYDROCHLORIDE 20 MG: 10 TABLET ORAL at 08:02

## 2024-02-23 RX ADMIN — BUPROPION HYDROCHLORIDE 300 MG: 300 TABLET, FILM COATED, EXTENDED RELEASE ORAL at 08:02

## 2024-02-23 RX ADMIN — OXYCODONE 5 MG: 5 TABLET ORAL at 04:02

## 2024-02-23 RX ADMIN — PROPRANOLOL HYDROCHLORIDE 20 MG: 10 TABLET ORAL at 09:02

## 2024-02-23 RX ADMIN — ACETAMINOPHEN 1000 MG: 500 TABLET ORAL at 09:02

## 2024-02-23 RX ADMIN — FLUOXETINE HYDROCHLORIDE 40 MG: 20 CAPSULE ORAL at 03:02

## 2024-02-23 RX ADMIN — GABAPENTIN 100 MG: 100 CAPSULE ORAL at 09:02

## 2024-02-23 RX ADMIN — METHOCARBAMOL 500 MG: 500 TABLET ORAL at 09:02

## 2024-02-23 RX ADMIN — PRAVASTATIN SODIUM 40 MG: 40 TABLET ORAL at 08:02

## 2024-02-23 RX ADMIN — LOSARTAN POTASSIUM 25 MG: 25 TABLET, FILM COATED ORAL at 08:02

## 2024-02-23 NOTE — ASSESSMENT & PLAN NOTE
- Xray R elbow showed an acute fracture of the proximal right ulna with displacement of the olecranon   - splinted in ED by Ortho  - Ortho consulted, appreciate assistance   - multimodal pain management   - PT/OT

## 2024-02-23 NOTE — H&P
Petar Villalobos - Emergency Dept  [facility] Medicine  History & Physical    Patient Name: Manisha Wynne  MRN: [id number]  Patient Class: OP- Observation  Admission Date: 2/22/2024  Attending Physician: Kami Cheek MD   Primary Care Provider: Iris Blue MD         Patient information was obtained from patient and ER records.     Subjective:     Principal Problem:Fall    Chief Complaint:   Chief Complaint   Patient presents with    Fall     Pt. Had just been discharged and was leaving facility.  Was walking and per pt. Her shoes (crocs) caught and she tripped.  Denies syncopal episode.  No LOC or NV.  Pt. Has large triangle size lac to L forehead and lac to R side of head.  Pt. Takes asprin daily.  No other s/s or complaints.      Laceration    Head Injury        HPI: Manisha Wynne is a 73 y.o. female with a hx of HTN, Afib, CKD, recurrent falls and lumbar stenosis presents to the ED s/p fall x 2. Patient states she originally came to the hospital for a NSGY appt and had a mechanical fall when leaving. She was brought to the ED to be evaluated and was walking out to go home and fell again. States it was another mechanical fall where her foot possibly got caught on something and she tripped. Denies a syncopal event. Denies LOC. Endorses head trauma. Patient states she does have falls at home last one being in December. She uses walking sticks intermittently. Denies using a walker. Denies bowel/ bladder incontinence. Denies numbness/ tingling in hands and feet. Endorses a headache and pain to her L elbow. Otherwise denies fever, chills, chest pain, SOB, abdominal pain and urinary symptoms.     ED: AF, VSS. CBC/ CMP largely unremarkable. CTH showed no acute intracranial process with no significant change from the recent prior study. Stable scalp hematoma laterally on the left. Stable minimal encephalomalacia of the inferior right frontal cortex may be related to remote trauma or small remote  infarct. Stable small remote lacunar infarct or perivascular space at the inferior margin of the right lateral basal ganglia. Xray R elbow showed acute fracture of the proximal right ulna with displacement of the olecranon. Ortho consulted. Admitted to .     Past Medical History:   Diagnosis Date    Abnormal Pap smear     Atrial fibrillation     AV block, 1st degree 07/25/2012    C. difficile diarrhea     RESOLVED    Cataract     Depression 07/24/2012    Facet arthritis of lumbar region 03/31/2015    Falls     3 falls in the last 6 mos--noted 6/19/19    Hyperlipidemia     Hypertension 07/24/2012    Neuropathy     Other specified cardiac dysrhythmias(427.89)     Sleep apnea     Syncope 07/24/2012    Tremors of nervous system     hands bilaterally       Past Surgical History:   Procedure Laterality Date    APPLICATION OF CARTILAGE GRAFT Bilateral 12/19/2019    Procedure: APPLICATION, CARTILAGE GRAFT AURICULAR JEZ;  Surgeon: Martinez Flores III, MD;  Location: [facility];  Service: ENT;  Laterality: Bilateral;    CARDIAC PACEMAKER PLACEMENT  09/07/2012    [id number]    CATARACT EXTRACTION W/  INTRAOCULAR LENS IMPLANT Right 5/16/2022    Procedure: EXTRACTION, CATARACT, WITH IOL INSERTION;  Surgeon: Ines Aguilera MD;  Location: [facility];  Service: Ophthalmology;  Laterality: Right;  Catal[place]     CATARACT EXTRACTION W/  INTRAOCULAR LENS IMPLANT Left 6/20/2022    Procedure: EXTRACTION, CATARACT, WITH IOL INSERTION;  Surgeon: Ines Aguilera MD;  Location: [facility];  Service: Ophthalmology;  Laterality: Left;  Catalys     DILATION AND CURETTAGE OF UTERUS      Hx of precancerous cells?    ENDOSCOPIC ULTRASOUND OF UPPER GASTROINTESTINAL TRACT N/A 7/5/2019    Procedure: ULTRASOUND, UPPER GI TRACT, ENDOSCOPIC;  Surgeon: Kev Calderon MD;  Location: [facility] ([address] [place] [address]);  Service: Endoscopy;  Laterality: N/A;  Pacemaker-Adam   appt confirmed-rb    MYELOGRAPHY N/A 5/4/2021    Procedure: Myelogram  CERVICAL,  THORACIC AND LUMBAR;  Surgeon: [facility] Provider;  Location: [address];  Service: Radiology;  Laterality: N/A;    NASAL SEPTOPLASTY N/A 12/19/2019    Procedure: SEPTOPLASTY, NOSE;  Surgeon: Martinez Flores III, MD;  Location: [facility];  Service: ENT;  Laterality: N/A;  FOLLOW DR SALVATORE KIMBROUGH PROTOCOL    OPEN REDUCTION AND INTERNAL FIXATION (ORIF) OF FRACTURE OF DISTAL RADIUS Left 1/24/2020    Procedure: ORIF, FRACTURE, RADIUS, DISTAL-left;  Surgeon: Ellen Moe MD;  Location: [facility] OR;  Service: Orthopedics;  Laterality: Left;    POSTERIOR FUSION OF CERVICAL SPINE WITH LAMINECTOMY N/A 11/14/2022    Procedure: LAMINECTOMY, SPINE, CERVICAL, WITH POSTERIOR FUSION C3-C6;  Surgeon: Nicolette Cheek MD;  Location: [address];  Service: Neurosurgery;  Laterality: N/A;  [place]ON[place] III, ASA III, BLOOD TYPE AND SCREEN, NEUROMONITORING EMG SEP MEP, BRACE/HALO [place], BED REGULAR BED, HEADREST [place], POSITION PRONE, SPECIAL EQUIPMENT NIKI [place], RADIOLOGY C-ARM, EXT. BONE STIMULATOR BIOMET    REPLACEMENT OF PACEMAKER GENERATOR Left 10/7/2022    Procedure: REPLACEMENT, PACEMAKER GENERATOR;  Surgeon: John Sheets MD;  Location: [facility] EP LAB;  Service: Cardiology;  Laterality: Left;  YAS, SSS, AVB, Dual PPM Gen Change,SJM, MAC ,CA, 3 prep,** Adam dcPPM in situ**    SURGICAL REMOVAL OF NASAL TURBINATE Bilateral 12/19/2019    Procedure: EXCISION, NASAL TURBINATE;  Surgeon: Martinez Flores III, MD;  Location: [facility];  Service: ENT;  Laterality: Bilateral;    TONSILLECTOMY      WISDOM TOOTH EXTRACTION         Review of patient's allergies indicates:  No Known Allergies    Current Facility-Administered Medications on File Prior to Encounter   Medication    [COMPLETED] acetaminophen tablet 1,000 mg     Current Outpatient Medications on File Prior to Encounter   Medication Sig    acetaminophen (TYLENOL) 500 MG tablet Take 2 tablets (1,000 mg total) by mouth every 8 (eight) hours. (Patient taking  differently: Take 1,000 mg by mouth 3 (three) times daily as needed.)    aspirin (ADULT LOW DOSE ASPIRIN) 81 MG EC tablet     buPROPion (WELLBUTRIN XL) 300 MG 24 hr tablet Take 1 tablet by mouth once daily.    calcium-vitamin D tablet 600 mg-200 units Take 1 tablet by mouth once daily.    cyanocobalamin (VITAMIN B-12) 1000 MCG tablet Take 100 mcg by mouth once daily.    FLUoxetine 40 MG capsule Take 1 capsule (40 mg total) by mouth once daily.    gabapentin (NEURONTIN) 100 MG capsule One capsule 2 (two) times daily.    lisdexamfetamine (VYVANSE) 70 MG capsule Take 1 capsule (70 mg total) by mouth every morning.    losartan (COZAAR) 25 MG tablet Take 1 tablet (25 mg total) by mouth once daily.    methyl salicylate-menthol 15-10% 15-10 % Crea Apply topically every evening.    mupirocin (BACTROBAN) 2 % ointment Apply topically 3 (three) times daily.    pravastatin (PRAVACHOL) 40 MG tablet Take 1 tablet (40 mg total) by mouth once daily.    PROLIA 60 mg/mL Syrg     propranoloL (INDERAL) 20 MG tablet Take 1 tablet (20 mg total) by mouth 2 (two) times daily.     Family History    None       Tobacco Use    Smoking status: Former     Current packs/day: 0.00     Average packs/day: 0.3 packs/day for 15.0 years (3.8 ttl pk-yrs)     Types: Cigarettes     Start date: 1967     Quit date: 1982     Years since quittin.6    Smokeless tobacco: Former   Substance and Sexual Activity    Alcohol use: Yes     Comment: no daily or heavy use, ~4 times per month    Drug use: No    Sexual activity: Not Currently     Partners: Male     Review of Systems   Constitutional:  Negative for activity change, chills and fever.   HENT:  Negative for trouble swallowing.    Eyes:  Negative for photophobia and visual disturbance.   Respiratory:  Negative for cough, chest tightness and shortness of breath.    Cardiovascular:  Negative for chest pain, palpitations and leg swelling.   Gastrointestinal:  Negative for abdominal pain,  constipation, diarrhea, nausea and vomiting.   Genitourinary:  Negative for dysuria, frequency and hematuria.   Musculoskeletal:  Positive for gait problem and myalgias. Negative for back pain and neck pain.   Skin:  Negative for rash and wound.   Neurological:  Positive for headaches. Negative for dizziness, syncope, speech difficulty and light-headedness.   Psychiatric/Behavioral:  Negative for agitation and confusion. The patient is not nervous/anxious.      Objective:     Vital Signs (Most Recent):  Temp: 97.9 °F (36.6 °C) (02/23/24 0016)  Pulse: 63 (02/23/24 0016)  Resp: 18 (02/22/24 2054)  BP: (!) 145/80 (02/23/24 0016)  SpO2: 100 % (02/23/24 0016) Vital Signs (24h Range):  Temp:  [97.4 °F (36.3 °C)-98.1 °F (36.7 °C)] 97.9 °F (36.6 °C)  Pulse:  [60-68] 63  Resp:  [16-18] 18  SpO2:  [99 %-100 %] 100 %  BP: (111-154)/(67-80) 145/80        There is no height or weight on file to calculate BMI.     Physical Exam  Vitals and nursing note reviewed.   Constitutional:       Appearance: She is well-developed.   HENT:      Head: Normocephalic.      Comments: Lac repaired to forehead  Eyes:      Pupils: Pupils are equal, round, and reactive to light.      Comments: Ecchymosis noted to L eye   Cardiovascular:      Rate and Rhythm: Normal rate and regular rhythm.   Pulmonary:      Effort: Pulmonary effort is normal.      Breath sounds: Normal breath sounds.   Abdominal:      Palpations: Abdomen is soft.      Tenderness: There is no abdominal tenderness.   Musculoskeletal:         General: Deformity present. No tenderness.      Right lower leg: No edema.      Left lower leg: No edema.      Comments: RUE splinted by Ortho  Able to wiggle fingers/ sensation intact   Skin:     General: Skin is warm and dry.   Neurological:      General: No focal deficit present.      Mental Status: She is alert and oriented to person, place, and time. Mental status is at baseline.      Motor: No weakness.   Psychiatric:         Behavior:  Behavior normal.              CRANIAL NERVES     CN III, IV, VI   Pupils are equal, round, and reactive to light.       Significant Labs: All pertinent labs within the past 24 hours have been reviewed.  BMP:   Recent Labs   Lab 02/22/24 2119   GLU 91      K 4.1      CO2 23   BUN 22   CREATININE 1.0   CALCIUM 10.0   MG 2.2     CBC:   Recent Labs   Lab 02/22/24 2119   WBC 8.25   HGB 11.8*   HCT 35.0*          Significant Imaging: I have reviewed all pertinent imaging results/findings within the past 24 hours.    Imaging Results              CT Head Without Contrast (Final result)  Result time 02/23/24 00:11:41      Final result by Darin Sahni MD (02/23/24 00:11:41)                   Impression:      1. No acute intracranial process with no significant change from the recent prior study.  2. Stable scalp hematoma laterally on the left.  3. Stable minimal encephalomalacia of the inferior right frontal cortex may be related to remote trauma or small remote infarct.  4. Stable small remote lacunar infarct or perivascular space at the inferior margin of the right lateral basal ganglia.  5. Bilateral nasal fractures are age indeterminate.  Recommend clinical correlation.  6. See above comments also.      Electronically signed by: Darin Sahni  Date:    02/23/2024  Time:    00:11               Narrative:    EXAMINATION:  CT HEAD WITHOUT CONTRAST    CLINICAL HISTORY:  Head trauma, moderate-severe;    TECHNIQUE:  Low dose axial CT images obtained throughout the head without intravenous contrast. Sagittal and coronal reconstructions were performed.    COMPARISON:  02/22/2024    FINDINGS:  There is no significant change from the study 4 hours prior.    Intracranial compartment:    Ventricles and sulci are normal in size for age without evidence of hydrocephalus. Scalp hematoma laterally on the left similar to the study.    Stable minimal encephalomalacia of the inferior right frontal cortex possibly  related remote trauma.  A small remote lacunar infarct is not excluded.  Small remote lacunar infarct or perivascular space at the inferior margin the right lateral basal ganglia is unchanged.    Stable calcification adjacent to the posterior falx on the right, possibly a calcified meningioma or chronic dural calcification.  No significant change.    No parenchymal mass, hemorrhage, edema or major vascular distribution infarct.    Skull/extracranial contents (limited evaluation): No fracture. Mastoid air cells and paranasal sinuses are essentially clear.    Bilateral nasal fractures are age indeterminate.  Recommend clinical correlation.                                       CT Cervical Spine Without Contrast (Final result)  Result time 02/23/24 00:21:47      Final result by Darin Sahni MD (02/23/24 00:21:47)                   Impression:      1. No acute abnormality  2. Multilevel chronic degenerative and postoperative changes.  See above comments.      Electronically signed by: Darin Sahni  Date:    02/23/2024  Time:    00:21               Narrative:    EXAMINATION:  CT CERVICAL SPINE WITHOUT CONTRAST    CLINICAL HISTORY:  Neck trauma (Age >= 65y);    TECHNIQUE:  Low dose axial CT images through the cervical spine, with sagittal and coronal reformations.  Contrast was not administered.    COMPARISON:  None    FINDINGS:  No acute fractures of the cervical spine.  Posterior surgical fusion hardware from C3 through C6.    Severe disc space narrowing at C5-6 with associated endplate degenerative changes.  Moderate disc space narrowing at C4-5, C2-3 and C6-7.    Mild grade 1 spondylolisthesis of C7 on T1.    Bilateral facet arthropathy from C2 through C6.    Laminectomy defect from C3 through C6.    No significant central canal narrowing.    Severe foraminal narrowing at C4-5 on the left, C5-6 on the left, C6-7 all on the left.    Moderate posterior disc osteophyte complex at C5-6 and to a slightly lesser  degree C4-5 at C6-7.    Limited evaluation of the intraspinal contents demonstrates no hematoma or mass.Paraspinal soft tissues exhibit no acute abnormalities.                                        X-Ray Elbow Complete Right (Final result)  Result time 02/22/24 23:15:24      Final result by Darin Sahni MD (02/22/24 23:15:24)                   Impression:      Acute fracture of the proximal right ulna with displacement of the olecranon.  See above comments.  Recommend orthopedic consultation and follow-up.    This report was flagged in Epic as abnormal.      Electronically signed by: Darin Sahni  Date:    02/22/2024  Time:    23:15               Narrative:    EXAMINATION:  XR ELBOW COMPLETE 3 VIEW RIGHT    CLINICAL HISTORY:  . Pain in right elbow    TECHNIQUE:  AP, lateral, and oblique views of the right elbow were performed.    COMPARISON:  07/31/2020    FINDINGS:  There is acute fracture involving the proximal white all in a with mild displacement of the olecranon posteriorly and superiorly.  The distal humerus appears intact.  The proximal radius is intact.    Joint hemarthrosis is noted.  A soft tissue edema is noted.    Recommend orthopedic consultation and follow-up.                                       X-Ray Wrist Complete Right (Final result)  Result time 02/22/24 23:12:24      Final result by Darin Sahni MD (02/22/24 23:12:24)                   Impression:      No acute radiographic abnormality.    Remote fractures of the distal radius and ulna.  See above comments.      Electronically signed by: Darin Sahni  Date:    02/22/2024  Time:    23:12               Narrative:    EXAMINATION:  XR WRIST COMPLETE 3 VIEWS RIGHT    CLINICAL HISTORY:  Pain in right wrist    TECHNIQUE:  PA, lateral, and oblique views of the right wrist were performed.    COMPARISON:  11/01/2022    FINDINGS:  Remote fracture of the distal radial metaphysis and epiphysis with sclerotic change and mild callus  formation.    The ulnar styloid process is flattened compared to the prior study.  This could be associated with a remote fracture in this region, new from the prior study.  No acute fractures are identified.    Carpal bones are intact.                                      Assessment/Plan:     * Fall  Gait instability   Patient is s/p fall x 2 in the hospital today. - LOC and + Head trauma  - AF, VSS  - CT C spine showed no acute process  - CTH showed no acute intracranial process with no significant change. Stable scalp hematoma laterally on the left   - Ortho consulted, concern for clonus on exam  - CT myelogram ordered by Ortho and pending  - fall precautions  - neuro checks  - PT/OT    Closed fracture of olecranon process of right ulna  - Xray R elbow showed an acute fracture of the proximal right ulna with displacement of the olecranon   - splinted in ED by Ortho  - Ortho consulted, appreciate assistance   - multimodal pain management   - PT/OT    CKD (chronic kidney disease) stage 3, GFR 30-59 ml/min  Creatine stable for now. BMP reviewed- noted CrCl cannot be calculated (Unknown ideal weight.). according to latest data. Based on current GFR, CKD stage is stage 3 - GFR 30-59.  Monitor UOP and serial BMP and adjust therapy as needed. Renally dose meds. Avoid nephrotoxic medications and procedures.  - Cr at baseline  - monitor daily CMP  - avoid nephrotoxic medications    Lumbar spinal stenosis  - hx noted, follows with NSGY outpatient  - CT myelogram pending  - Ortho consulted, appreciate assistance       Cardiac pacemaker in situ  - hx noted      Dyslipidemia  - continue home statin      Atrial fibrillation  Patient with Paroxysmal (<7 days) atrial fibrillation which is controlled currently with Beta Blocker. Patient is currently in sinus rhythm.[id number] Score: 3.    - pt is not on chronic anticoagulation  - continue tele    Essential hypertension  Chronic, controlled. Latest blood pressure and vitals  reviewed-     Temp:  [97.4 °F (36.3 °C)-98.1 °F (36.7 °C)]   Pulse:  [60-68]   Resp:  [16-18]   BP: (111-154)/(67-80)   SpO2:  [99 %-100 %] .   Home meds for hypertension were reviewed and noted below.   Hypertension Medications               losartan (COZAAR) 25 MG tablet Take 1 tablet (25 mg total) by mouth once daily.    propranoloL (INDERAL) 20 MG tablet Take 1 tablet (20 mg total) by mouth 2 (two) times daily.            While in the hospital, will manage blood pressure as follows; Continue home antihypertensive regimen    Will utilize p.r.n. blood pressure medication only if patient's blood pressure greater than 180/110 and she develops symptoms such as worsening chest pain or shortness of breath.      VTE Risk Mitigation (From admission, onward)           Ordered     IP VTE HIGH RISK PATIENT  Once         02/23/24 0156     Reason for No Pharmacological VTE Prophylaxis  Once        Question:  Reasons:  Answer:  Risk of Bleeding    02/23/24 0156     Place sequential compression device  Until discontinued         02/23/24 0156                         On 02/23/2024, patient should be placed in hospital observation services under my care in collaboration with Dr. Boris Coffman.           Nancy Green PA-C  Department of Hospital Medicine  [facility] - Emergency Dept

## 2024-02-23 NOTE — SUBJECTIVE & OBJECTIVE
Past Medical History:   Diagnosis Date    Abnormal Pap smear     Atrial fibrillation     AV block, 1st degree 07/25/2012    C. difficile diarrhea     RESOLVED    Cataract     Depression 07/24/2012    Facet arthritis of lumbar region 03/31/2015    Falls     3 falls in the last 6 mos--noted 6/19/19    Hyperlipidemia     Hypertension 07/24/2012    Neuropathy     Other specified cardiac dysrhythmias(427.89)     Sleep apnea     Syncope 07/24/2012    Tremors of nervous system     hands bilaterally       Past Surgical History:   Procedure Laterality Date    APPLICATION OF CARTILAGE GRAFT Bilateral 12/19/2019    Procedure: APPLICATION, CARTILAGE GRAFT AURICULAR JEZ;  Surgeon: Martinez Flores III, MD;  Location: [facility];  Service: ENT;  Laterality: Bilateral;    CARDIAC PACEMAKER PLACEMENT  09/07/2012    [id number]    CATARACT EXTRACTION W/  INTRAOCULAR LENS IMPLANT Right 5/16/2022    Procedure: EXTRACTION, CATARACT, WITH IOL INSERTION;  Surgeon: Ines Aguilera MD;  Location: [facility];  Service: Ophthalmology;  Laterality: Right;  Catalys     CATARACT EXTRACTION W/  INTRAOCULAR LENS IMPLANT Left 6/20/2022    Procedure: EXTRACTION, CATARACT, WITH IOL INSERTION;  Surgeon: Ines Aguilera MD;  Location: [facility];  Service: Ophthalmology;  Laterality: Left;  Catalys     DILATION AND CURETTAGE OF UTERUS      Hx of precancerous cells?    ENDOSCOPIC ULTRASOUND OF UPPER GASTROINTESTINAL TRACT N/A 7/5/2019    Procedure: ULTRASOUND, UPPER GI TRACT, ENDOSCOPIC;  Surgeon: Kev Calderon MD;  Location: [facility] (2ND FLR);  Service: Endoscopy;  Laterality: N/A;  Pacemaker-Adam   appt confirmed-rb    MYELOGRAPHY N/A 5/4/2021    Procedure: Myelogram  CERVICAL, THORACIC AND LUMBAR;  Surgeon: [facility] Diagnostic Provider;  Location: [facility] 2ND FLR;  Service: Radiology;  Laterality: N/A;    NASAL SEPTOPLASTY N/A 12/19/2019    Procedure: SEPTOPLASTY, NOSE;  Surgeon: Martinez Flores III, MD;  Location: [facility];  Service: ENT;   Laterality: N/A;  FOLLOW DR SALVATORE KIMBROUGH PROTOCOL    OPEN REDUCTION AND INTERNAL FIXATION (ORIF) OF FRACTURE OF DISTAL RADIUS Left 1/24/2020    Procedure: ORIF, FRACTURE, RADIUS, DISTAL-left;  Surgeon: Ellen Moe MD;  Location: [facility] OR;  Service: Orthopedics;  Laterality: Left;    POSTERIOR FUSION OF CERVICAL SPINE WITH LAMINECTOMY N/A 11/14/2022    Procedure: LAMINECTOMY, SPINE, CERVICAL, WITH POSTERIOR FUSION C3-C6;  Surgeon: Nicolette Cheek MD;  Location: [address] FLR;  Service: Neurosurgery;  Laterality: N/A;  TORONTO III, ASA III, BLOOD TYPE AND SCREEN, NEUROMONITORING EMG SEP MEP, BRACE/HALO [place], BED REGULAR BED, HEADREST [place], POSITION PRONE, SPECIAL EQUIPMENT NIKI MIDDLETON, RADIOLOGY C-ARM, EXT. BONE STIMULATOR BIOMET    REPLACEMENT OF PACEMAKER GENERATOR Left 10/7/2022    Procedure: REPLACEMENT, PACEMAKER GENERATOR;  Surgeon: John Sheets MD;  Location: [facility] EP LAB;  Service: Cardiology;  Laterality: Left;  YAS, SSS, AVB, Dual PPM Gen Change,SJM, MAC ,CA, 3 prep,** Adam dcPPM in situ**    SURGICAL REMOVAL OF NASAL TURBINATE Bilateral 12/19/2019    Procedure: EXCISION, NASAL TURBINATE;  Surgeon: Martinez Flores III, MD;  Location: [facility];  Service: ENT;  Laterality: Bilateral;    TONSILLECTOMY      WISDOM TOOTH EXTRACTION         Review of patient's allergies indicates:  No Known Allergies    Current Facility-Administered Medications on File Prior to Encounter   Medication    [COMPLETED] acetaminophen tablet 1,000 mg     Current Outpatient Medications on File Prior to Encounter   Medication Sig    acetaminophen (TYLENOL) 500 MG tablet Take 2 tablets (1,000 mg total) by mouth every 8 (eight) hours. (Patient taking differently: Take 1,000 mg by mouth 3 (three) times daily as needed.)    aspirin (ADULT LOW DOSE ASPIRIN) 81 MG EC tablet     buPROPion (WELLBUTRIN XL) 300 MG 24 hr tablet Take 1 tablet by mouth once daily.    calcium-vitamin D tablet 600 mg-200 units Take 1 tablet by  mouth once daily.    cyanocobalamin (VITAMIN B-12) 1000 MCG tablet Take 100 mcg by mouth once daily.    FLUoxetine 40 MG capsule Take 1 capsule (40 mg total) by mouth once daily.    gabapentin (NEURONTIN) 100 MG capsule One capsule 2 (two) times daily.    lisdexamfetamine (VYVANSE) 70 MG capsule Take 1 capsule (70 mg total) by mouth every morning.    losartan (COZAAR) 25 MG tablet Take 1 tablet (25 mg total) by mouth once daily.    methyl salicylate-menthol 15-10% 15-10 % Crea Apply topically every evening.    mupirocin (BACTROBAN) 2 % ointment Apply topically 3 (three) times daily.    pravastatin (PRAVACHOL) 40 MG tablet Take 1 tablet (40 mg total) by mouth once daily.    PROLIA 60 mg/mL Syrg     propranoloL (INDERAL) 20 MG tablet Take 1 tablet (20 mg total) by mouth 2 (two) times daily.     Family History    None       Tobacco Use    Smoking status: Former     Current packs/day: 0.00     Average packs/day: 0.3 packs/day for 15.0 years (3.8 ttl pk-yrs)     Types: Cigarettes     Start date: 1967     Quit date: 1982     Years since quittin.6    Smokeless tobacco: Former   Substance and Sexual Activity    Alcohol use: Yes     Comment: no daily or heavy use, ~4 times per month    Drug use: No    Sexual activity: Not Currently     Partners: Male     Review of Systems   Constitutional:  Negative for activity change, chills and fever.   HENT:  Negative for trouble swallowing.    Eyes:  Negative for photophobia and visual disturbance.   Respiratory:  Negative for cough, chest tightness and shortness of breath.    Cardiovascular:  Negative for chest pain, palpitations and leg swelling.   Gastrointestinal:  Negative for abdominal pain, constipation, diarrhea, nausea and vomiting.   Genitourinary:  Negative for dysuria, frequency and hematuria.   Musculoskeletal:  Positive for gait problem and myalgias. Negative for back pain and neck pain.   Skin:  Negative for rash and wound.   Neurological:  Positive for  headaches. Negative for dizziness, syncope, speech difficulty and light-headedness.   Psychiatric/Behavioral:  Negative for agitation and confusion. The patient is not nervous/anxious.      Objective:     Vital Signs (Most Recent):  Temp: 97.9 °F (36.6 °C) (02/23/24 0016)  Pulse: 63 (02/23/24 0016)  Resp: 18 (02/22/24 2054)  BP: (!) 145/80 (02/23/24 0016)  SpO2: 100 % (02/23/24 0016) Vital Signs (24h Range):  Temp:  [97.4 °F (36.3 °C)-98.1 °F (36.7 °C)] 97.9 °F (36.6 °C)  Pulse:  [60-68] 63  Resp:  [16-18] 18  SpO2:  [99 %-100 %] 100 %  BP: (111-154)/(67-80) 145/80        There is no height or weight on file to calculate BMI.     Physical Exam  Vitals and nursing note reviewed.   Constitutional:       Appearance: She is well-developed.   HENT:      Head: Normocephalic.      Comments: Lac repaired to forehead  Eyes:      Pupils: Pupils are equal, round, and reactive to light.      Comments: Ecchymosis noted to L eye   Cardiovascular:      Rate and Rhythm: Normal rate and regular rhythm.   Pulmonary:      Effort: Pulmonary effort is normal.      Breath sounds: Normal breath sounds.   Abdominal:      Palpations: Abdomen is soft.      Tenderness: There is no abdominal tenderness.   Musculoskeletal:         General: Deformity present. No tenderness.      Right lower leg: No edema.      Left lower leg: No edema.      Comments: RUE splinted by Ortho  Able to wiggle fingers/ sensation intact   Skin:     General: Skin is warm and dry.   Neurological:      General: No focal deficit present.      Mental Status: She is alert and oriented to person, place, and time. Mental status is at baseline.      Motor: No weakness.   Psychiatric:         Behavior: Behavior normal.              CRANIAL NERVES     CN III, IV, VI   Pupils are equal, round, and reactive to light.       Significant Labs: All pertinent labs within the past 24 hours have been reviewed.  BMP:   Recent Labs   Lab 02/22/24  2119   GLU 91      K 4.1       CO2 23   BUN 22   CREATININE 1.0   CALCIUM 10.0   MG 2.2     CBC:   Recent Labs   Lab 02/22/24  2119   WBC 8.25   HGB 11.8*   HCT 35.0*          Significant Imaging: I have reviewed all pertinent imaging results/findings within the past 24 hours.    Imaging Results              CT Head Without Contrast (Final result)  Result time 02/23/24 00:11:41      Final result by Darin Sahni MD (02/23/24 00:11:41)                   Impression:      1. No acute intracranial process with no significant change from the recent prior study.  2. Stable scalp hematoma laterally on the left.  3. Stable minimal encephalomalacia of the inferior right frontal cortex may be related to remote trauma or small remote infarct.  4. Stable small remote lacunar infarct or perivascular space at the inferior margin of the right lateral basal ganglia.  5. Bilateral nasal fractures are age indeterminate.  Recommend clinical correlation.  6. See above comments also.      Electronically signed by: Darin Sahni  Date:    02/23/2024  Time:    00:11               Narrative:    EXAMINATION:  CT HEAD WITHOUT CONTRAST    CLINICAL HISTORY:  Head trauma, moderate-severe;    TECHNIQUE:  Low dose axial CT images obtained throughout the head without intravenous contrast. Sagittal and coronal reconstructions were performed.    COMPARISON:  02/22/2024    FINDINGS:  There is no significant change from the study 4 hours prior.    Intracranial compartment:    Ventricles and sulci are normal in size for age without evidence of hydrocephalus. Scalp hematoma laterally on the left similar to the study.    Stable minimal encephalomalacia of the inferior right frontal cortex possibly related remote trauma.  A small remote lacunar infarct is not excluded.  Small remote lacunar infarct or perivascular space at the inferior margin the right lateral basal ganglia is unchanged.    Stable calcification adjacent to the posterior falx on the right, possibly a  calcified meningioma or chronic dural calcification.  No significant change.    No parenchymal mass, hemorrhage, edema or major vascular distribution infarct.    Skull/extracranial contents (limited evaluation): No fracture. Mastoid air cells and paranasal sinuses are essentially clear.    Bilateral nasal fractures are age indeterminate.  Recommend clinical correlation.                                       CT Cervical Spine Without Contrast (Final result)  Result time 02/23/24 00:21:47      Final result by Darin Sahni MD (02/23/24 00:21:47)                   Impression:      1. No acute abnormality  2. Multilevel chronic degenerative and postoperative changes.  See above comments.      Electronically signed by: Darin Sahni  Date:    02/23/2024  Time:    00:21               Narrative:    EXAMINATION:  CT CERVICAL SPINE WITHOUT CONTRAST    CLINICAL HISTORY:  Neck trauma (Age >= 65y);    TECHNIQUE:  Low dose axial CT images through the cervical spine, with sagittal and coronal reformations.  Contrast was not administered.    COMPARISON:  None    FINDINGS:  No acute fractures of the cervical spine.  Posterior surgical fusion hardware from C3 through C6.    Severe disc space narrowing at C5-6 with associated endplate degenerative changes.  Moderate disc space narrowing at C4-5, C2-3 and C6-7.    Mild grade 1 spondylolisthesis of C7 on T1.    Bilateral facet arthropathy from C2 through C6.    Laminectomy defect from C3 through C6.    No significant central canal narrowing.    Severe foraminal narrowing at C4-5 on the left, C5-6 on the left, C6-7 all on the left.    Moderate posterior disc osteophyte complex at C5-6 and to a slightly lesser degree C4-5 at C6-7.    Limited evaluation of the intraspinal contents demonstrates no hematoma or mass.Paraspinal soft tissues exhibit no acute abnormalities.                                        X-Ray Elbow Complete Right (Final result)  Result time 02/22/24 23:15:24       Final result by Darin Sahni MD (02/22/24 23:15:24)                   Impression:      Acute fracture of the proximal right ulna with displacement of the olecranon.  See above comments.  Recommend orthopedic consultation and follow-up.    This report was flagged in Epic as abnormal.      Electronically signed by: Darin Sahni  Date:    02/22/2024  Time:    23:15               Narrative:    EXAMINATION:  XR ELBOW COMPLETE 3 VIEW RIGHT    CLINICAL HISTORY:  . Pain in right elbow    TECHNIQUE:  AP, lateral, and oblique views of the right elbow were performed.    COMPARISON:  07/31/2020    FINDINGS:  There is acute fracture involving the proximal white all in a with mild displacement of the olecranon posteriorly and superiorly.  The distal humerus appears intact.  The proximal radius is intact.    Joint hemarthrosis is noted.  A soft tissue edema is noted.    Recommend orthopedic consultation and follow-up.                                       X-Ray Wrist Complete Right (Final result)  Result time 02/22/24 23:12:24      Final result by Darin Sahni MD (02/22/24 23:12:24)                   Impression:      No acute radiographic abnormality.    Remote fractures of the distal radius and ulna.  See above comments.      Electronically signed by: Darin Sahni  Date:    02/22/2024  Time:    23:12               Narrative:    EXAMINATION:  XR WRIST COMPLETE 3 VIEWS RIGHT    CLINICAL HISTORY:  Pain in right wrist    TECHNIQUE:  PA, lateral, and oblique views of the right wrist were performed.    COMPARISON:  11/01/2022    FINDINGS:  Remote fracture of the distal radial metaphysis and epiphysis with sclerotic change and mild callus formation.    The ulnar styloid process is flattened compared to the prior study.  This could be associated with a remote fracture in this region, new from the prior study.  No acute fractures are identified.    Carpal bones are intact.

## 2024-02-23 NOTE — SUBJECTIVE & OBJECTIVE
(Not in a hospital admission)      Review of patient's allergies indicates:  No Known Allergies    Past Medical History:   Diagnosis Date    Abnormal Pap smear     Atrial fibrillation     AV block, 1st degree 07/25/2012    C. difficile diarrhea     RESOLVED    Cataract     Depression 07/24/2012    Facet arthritis of lumbar region 03/31/2015    Falls     3 falls in the last 6 mos--noted 6/19/19    Hyperlipidemia     Hypertension 07/24/2012    Neuropathy     Other specified cardiac dysrhythmias(427.89)     Sleep apnea     Syncope 07/24/2012    Tremors of nervous system     hands bilaterally     Past Surgical History:   Procedure Laterality Date    APPLICATION OF CARTILAGE GRAFT Bilateral 12/19/2019    Procedure: APPLICATION, CARTILAGE GRAFT AURICULAR JEZ;  Surgeon: Martinez Flores III, MD;  Location: [facility];  Service: ENT;  Laterality: Bilateral;    CARDIAC PACEMAKER PLACEMENT  09/07/2012    [id number]    CATARACT EXTRACTION W/  INTRAOCULAR LENS IMPLANT Right 5/16/2022    Procedure: EXTRACTION, CATARACT, WITH IOL INSERTION;  Surgeon: Ines Aguilera MD;  Location: [facility] OR;  Service: Ophthalmology;  Laterality: Right;  Catalys     CATARACT EXTRACTION W/  INTRAOCULAR LENS IMPLANT Left 6/20/2022    Procedure: EXTRACTION, CATARACT, WITH IOL INSERTION;  Surgeon: Ines Aguilera MD;  Location: [facility] OR;  Service: Ophthalmology;  Laterality: Left;  Catalys     DILATION AND CURETTAGE OF UTERUS      Hx of precancerous cells?    ENDOSCOPIC ULTRASOUND OF UPPER GASTROINTESTINAL TRACT N/A 7/5/2019    Procedure: ULTRASOUND, UPPER GI TRACT, ENDOSCOPIC;  Surgeon: Kev Calderon MD;  Location: [facility] ENDO (2ND FLR);  Service: Endoscopy;  Laterality: N/A;  Pacemaker-Adam   appt confirmed-rb    MYELOGRAPHY N/A 5/4/2021    Procedure: Myelogram  CERVICAL, THORACIC AND LUMBAR;  Surgeon: [facility] Diagnostic Provider;  Location: [facility] OR 2ND FLR;  Service: Radiology;  Laterality: N/A;    NASAL SEPTOPLASTY N/A 12/19/2019     Procedure: SEPTOPLASTY, NOSE;  Surgeon: Martinez Flores III, MD;  Location: [facility];  Service: ENT;  Laterality: N/A;  FOLLOW DR SALVATORE KIMBROUGH PROTOCOL    OPEN REDUCTION AND INTERNAL FIXATION (ORIF) OF FRACTURE OF DISTAL RADIUS Left 2020    Procedure: ORIF, FRACTURE, RADIUS, DISTAL-left;  Surgeon: Ellen Moe MD;  Location: [facility] OR;  Service: Orthopedics;  Laterality: Left;    POSTERIOR FUSION OF CERVICAL SPINE WITH LAMINECTOMY N/A 2022    Procedure: LAMINECTOMY, SPINE, CERVICAL, WITH POSTERIOR FUSION C3-C6;  Surgeon: Nicolette Cheek MD;  [address];  Service: Neurosurgery;  Laterality: N/A;  TORONTO III, ASA III, BLOOD TYPE AND SCREEN, NEUROMONITORING EMG SEP MEP, BRACE/HALO [place], BED REGULAR BED, HEADREST [place], POSITION PRONE, SPECIAL EQUIPMENT NIKI MIDDLETON, RADIOLOGY C-ARM, EXT. BONE STIMULATOR BIOMET    REPLACEMENT OF PACEMAKER GENERATOR Left 10/7/2022    Procedure: REPLACEMENT, PACEMAKER GENERATOR;  Surgeon: John Sheets MD;  Location: [facility] EP LAB;  Service: Cardiology;  Laterality: Left;  YAS, SSS, AVB, Dual PPM Gen Change,SJM, MAC ,DE, 3 prep,** Adam dcPPM in situ**    SURGICAL REMOVAL OF NASAL TURBINATE Bilateral 2019    Procedure: EXCISION, NASAL TURBINATE;  Surgeon: Martinez Flores III, MD;  Location: [facility];  Service: ENT;  Laterality: Bilateral;    TONSILLECTOMY      WISDOM TOOTH EXTRACTION       Family History    None       Tobacco Use    Smoking status: Former     Current packs/day: 0.00     Average packs/day: 0.3 packs/day for 15.0 years (3.8 ttl pk-yrs)     Types: Cigarettes     Start date: 1967     Quit date: 1982     Years since quittin.6    Smokeless tobacco: Former   Substance and Sexual Activity    Alcohol use: Yes     Comment: no daily or heavy use, ~4 times per month    Drug use: No    Sexual activity: Not Currently     Partners: Male     Review of Systems  Objective:        There is no height or weight on file to  "calculate BMI.  Vital Signs (Most Recent):  Temp: 98 °F (36.7 °C) (02/23/24 1702)  Pulse: 63 (02/23/24 1702)  Resp: 20 (02/23/24 1702)  BP: 112/62 (02/23/24 1702)  SpO2: 100 % (02/23/24 1515) Vital Signs (24h Range):  Temp:  [97.4 °F (36.3 °C)-98.2 °F (36.8 °C)] 98 °F (36.7 °C)  Pulse:  [60-81] 63  Resp:  [16-20] 20  SpO2:  [97 %-100 %] 100 %  BP: (109-154)/(56-80) 112/62           Physical Exam     General: well developed, well nourished, no distress.   Head: normocephalic, repaired laceration to left forehead above orbit, mild periorbital ecchymosis on the left   Neurologic: Alert and oriented. Thought content appropriate.  GCS: Motor: 6/Verbal: 5/Eyes: 4 GCS Total: 15  Mental Status: Awake, Alert, Oriented x3  Cranial nerves: face symmetric, tongue midline, CN II-XII grossly intact.   Eyes: pupils equal, round, reactive to light with accomodation, EOMI.  Sensory: intact to light touch throughout  Motor Strength:Moves all extremities spontaneously with good tone.  Full strength upper and lower extremities. No abnormal movements seen. Unable to assess right arm strength due to splint     Strength  Deltoids Triceps Biceps Wrist Extension Wrist Flexion Hand    Upper: R 5/5         L 5/5 5/5 5/5 5/5 5/5 5/5     Iliopsoas Quadriceps Knee  Flexion Tibialis  anterior Gastro- cnemius EHL   Lower: R 5/5 5/5 5/5 5/5 5/5 5/5    L 5/5 5/5 5/5 5/5 5/5 5/5     DTR's - 2 + and symmetric in UE and LE  Pronator Drift: no drift noted  Finger-to-nose: Intact bilaterally  Maria: positive bilaterally  Clonus: absent  Babinski: absent  Straight leg raise: negative      Significant Labs:  Recent Labs   Lab 02/22/24 2119 02/23/24  0405   GLU 91 102    139   K 4.1 3.9    108   CO2 23 22*   BUN 22 20   CREATININE 1.0 0.9   CALCIUM 10.0 9.4   MG 2.2 2.1     Recent Labs   Lab 02/22/24  2119 02/23/24  0405   WBC 8.25 10.48   HGB 11.8* 10.7*   HCT 35.0* 31.6*    109*     No results for input(s): "LABPT", "INR", "APTT" " in the last 48 hours.  Microbiology Results (last 7 days)       ** No results found for the last 168 hours. **          All pertinent labs from the last 24 hours have been reviewed.    Significant Diagnostics:  I have reviewed and interpreted all pertinent imaging results/findings within the past 24 hours.

## 2024-02-23 NOTE — PLAN OF CARE
Evaluation completed, plan of care established.    Problem: Occupational Therapy  Goal: Occupational Therapy Goal  Description: Goals to be met by: 3/23/24     Patient will increase functional independence with ADLs by performing:    UE Dressing with [place].  LE Dressing with [place].  Grooming while standing at sink with Modified [place].  Toileting from toilet with Modified [place] for hygiene and clothing management.   Supine to sit with [place].  Step transfer with Modified [place]  Toilet transfer to toilet with Modified [place].    Outcome: Ongoing, Progressing

## 2024-02-23 NOTE — ED NOTES
Pt remains on stretcher , . Side rails up x2, call bell in reach, bed in low position with brake engaged.  AAox4, skin w/d, resp wnl , rt arm in sling and sutures above left eyebrow d/I, bruising noted to face. Offers no c/o's at thgis time.  LOC: The patient is awake and alert; oriented x 3 and speaking appropriately.  APPEARANCE: Patient resting comfortably, patient is clean and well groomed  SKIN: warm and dry, normal skin turgor & moist mucus membranes, skin intact, no breakdown noted.  MUSCULOSKELETAL: Patient moving all extremities well, rt arm in sling   RESPIRATORY: Airway is open and patent, respirations are spontaneous, normal effort and rate  CARDIAC: Patient has a normal rate, no peripheral edema noted, capillary refill < 3 seconds; No complaints of chest pain   ABDOMEN: Soft and non tender to palpation, no distention noted.

## 2024-02-23 NOTE — ASSESSMENT & PLAN NOTE
Creatine stable for now. BMP reviewed- noted CrCl cannot be calculated (Unknown ideal weight.). according to latest data. Based on current GFR, CKD stage is stage 3 - GFR 30-59.  Monitor UOP and serial BMP and adjust therapy as needed. Renally dose meds. Avoid nephrotoxic medications and procedures.  - Cr at baseline  - monitor daily CMP  - avoid nephrotoxic medications

## 2024-02-23 NOTE — PHARMACY MED REC
"Admission Medication History     The home medication history was taken by Clementina Alva.    You may go to "Admission" then "Reconcile Home Medications" tabs to review and/or act upon these items.     The home medication list has been updated by the Pharmacy department.   Please read ALL comments highlighted in yellow.   Please address this information as you see fit.    Feel free to contact us if you have any questions or require assistance.      The medications listed below were removed from the home medication list. Please reorder if appropriate:  Patient reports no longer taking the following medication(s):  MUPIROCIN 2 % OINT    Medications listed below were obtained from: Patient  Current Outpatient Medications on File Prior to Encounter   Medication Sig    acetaminophen (TYLENOL) 500 MG tablet Take 2 tablets (1,000 mg total) by mouth 3 (three) times daily as needed for Pain.    aspirin (ADULT LOW DOSE ASPIRIN) 81 MG EC tablet Take 81 mg by mouth once daily.    BIOFREEZE, MENTHOL, TOP Apply topically to the affected area daily as needed for Pain.    buPROPion (WELLBUTRIN XL) 300 MG 24 hr tablet Take 1 tablet by mouth once daily.    calcium-vitamin D tablet 600 mg-200 units Take 1 tablet by mouth once daily.    cyanocobalamin (VITAMIN B-12) 1000 MCG tablet Take 100 mcg by mouth once daily.    FLUoxetine 40 MG capsule Take 1 capsule (40 mg total) by mouth once daily.    gabapentin (NEURONTIN) 100 MG capsule Take 200 mg by mouth once daily. One capsule 2 (two) times daily.    lisdexamfetamine (VYVANSE) 70 MG capsule Take 1 capsule (70 mg total) by mouth every morning.    losartan (COZAAR) 25 MG tablet Take 1 tablet (25 mg total) by mouth once daily.    melatonin (MELATIN) 5 mg Take 1 tablet by mouth every evening. (For insomnia)    methyl salicylate-menthol 15-10% 15-10 % Crea Apply topically nightly as needed (Pain).    naproxen sodium (ALEVE) 220 MG tablet Take 440 mg by mouth nightly as needed (Back pain).    " pravastatin (PRAVACHOL) 40 MG tablet Take 1 tablet (40 mg total) by mouth once daily.    PROLIA 60 mg/mL Syrg Inject 60 mg into the skin every 6 (six) months.    propranoloL (INDERAL) 20 MG tablet Take 1 tablet (20 mg total) by mouth 2 (two) times daily.    propylene glycol/peg 400/PF (SYSTANE, PF, OPHT) Place 1 drop into both eyes 2 (two) times daily as needed (Dry eye).                 Clementina Alva  EXT [id number]                .

## 2024-02-23 NOTE — ASSESSMENT & PLAN NOTE
Patient with Paroxysmal (<7 days) atrial fibrillation which is controlled currently with Beta Blocker. Patient is currently in sinus rhythm.[id number] Score: 3.    - pt is not on chronic anticoagulation  - continue tele

## 2024-02-23 NOTE — ASSESSMENT & PLAN NOTE
- hx noted, follows with NSGY outpatient  - CT myelogram pending  - Ortho consulted, appreciate assistance

## 2024-02-23 NOTE — HPI
73 y.o. female with a hx of HTN, Afib, CKD, cervical myelopathy s/p C3-6 PCF by Dr. Cheek 11/2022, recurrent falls and lumbar stenosis who presents to the ED s/p 2 falls yesterday. Patient states she originally came to the hospital for a NSGY appt and had a mechanical fall when leaving. She was brought to the ED to be evaluated and was walking out to go home when she fell again. Striking her head on the ground. She reports her shoe got caught and tripped her. She denies feeling off balance at the time of the fall. She denies LOC. She uses walking sticks at home to ambulate. Denies using a walker. Denies neck/worsening neck pain, back pain, bowel/ bladder incontinence, numbness/ tingling in hands and feet. She was found to have a right ulnar fracture for which orthopedics was consulted. CTH showed no acute intracranial process with no significant change from the recent prior study. CT cervical spine shows stable hardware placement as compared to previous without acute findings.

## 2024-02-23 NOTE — HPI
Manisha Wynne is a 73 y.o. female with a hx of HTN, Afib, CKD, recurrent falls and lumbar stenosis presents to the ED s/p fall x 2. Patient states she originally came to the hospital for a NSGY appt and had a mechanical fall when leaving. She was brought to the ED to be evaluated and was walking out to go home and fell again. States it was another mechanical fall where her foot possibly got caught on something and she tripped. Denies a syncopal event. Denies LOC. Endorses head trauma. Patient states she does have falls at home last one being in December. She uses walking sticks intermittently. Denies using a walker. Denies bowel/ bladder incontinence. Denies numbness/ tingling in hands and feet. Endorses a headache and pain to her L elbow. Otherwise denies fever, chills, chest pain, SOB, abdominal pain and urinary symptoms.     ED: AF, VSS. CBC/ CMP largely unremarkable. CTH showed no acute intracranial process with no significant change from the recent prior study. Stable scalp hematoma laterally on the left. Stable minimal encephalomalacia of the inferior right frontal cortex may be related to remote trauma or small remote infarct. Stable small remote lacunar infarct or perivascular space at the inferior margin of the right lateral basal ganglia. Xray R elbow showed acute fracture of the proximal right ulna with displacement of the olecranon. Ortho consulted. Admitted to .

## 2024-02-23 NOTE — ASSESSMENT & PLAN NOTE
Gait instability   Patient is s/p fall x 2 in the hospital today. - LOC and + Head trauma  - AF, VSS  - CT C spine showed no acute process  - CTH showed no acute intracranial process with no significant change. Stable scalp hematoma laterally on the left   - Ortho consulted, concern for clonus on exam  - CT myelogram ordered by Ortho and pending  - fall precautions  - neuro checks  - PT/OT

## 2024-02-23 NOTE — CONSULTS
[facility] - [facility]  Neurosurgery  Consult Note    Inpatient consult to Neurosurgery  Consult performed by: Magui Espinoza PA-C  Consult ordered by: Jennie Andrade PA-C        Subjective:     Chief Complaint/Reason for Admission: fall     History of Present Illness: 73 y.o. female with a hx of HTN, Afib, CKD, cervical myelopathy s/p C3-6 PCF by Dr. Cheek 11/2022, recurrent falls and lumbar stenosis who presents to the ED s/p 2 falls yesterday. Patient states she originally came to the hospital for a NSGY appt and had a mechanical fall when leaving. She was brought to the ED to be evaluated and was walking out to go home when she fell again. Striking her head on the ground. She reports her shoe got caught and tripped her. She denies feeling off balance at the time of the fall. She denies LOC. She uses walking sticks at home to ambulate. Denies using a walker. Denies neck/worsening neck pain, back pain, bowel/ bladder incontinence, numbness/ tingling in hands and feet. She was found to have a right ulnar fracture for which orthopedics was consulted. CTH showed no acute intracranial process with no significant change from the recent prior study. CT cervical spine shows stable hardware placement as compared to previous without acute findings.     (Not in a hospital admission)      Review of patient's allergies indicates:  No Known Allergies    Past Medical History:   Diagnosis Date    Abnormal Pap smear     Atrial fibrillation     AV block, 1st degree 07/25/2012    C. difficile diarrhea     RESOLVED    Cataract     Depression 07/24/2012    Facet arthritis of lumbar region 03/31/2015    Falls     3 falls in the last 6 mos--noted 6/19/19    Hyperlipidemia     Hypertension 07/24/2012    Neuropathy     Other specified cardiac dysrhythmias(427.89)     Sleep apnea     Syncope 07/24/2012    Tremors of nervous system     hands bilaterally     Past Surgical History:   Procedure Laterality Date    APPLICATION OF  CARTILAGE GRAFT Bilateral 12/19/2019    Procedure: APPLICATION, CARTILAGE GRAFT AURICULAR JEZ;  Surgeon: Martinez Flores III, MD;  Location: [facility];  Service: ENT;  Laterality: Bilateral;    CARDIAC PACEMAKER PLACEMENT  09/07/2012    [id number]    CATARACT EXTRACTION W/  INTRAOCULAR LENS IMPLANT Right 5/16/2022    Procedure: EXTRACTION, CATARACT, WITH IOL INSERTION;  Surgeon: Ines Aguilera MD;  Location: [facility] OR;  Service: Ophthalmology;  Laterality: Right;  Catalys     CATARACT EXTRACTION W/  INTRAOCULAR LENS IMPLANT Left 6/20/2022    Procedure: EXTRACTION, CATARACT, WITH IOL INSERTION;  Surgeon: Ines Aguilera MD;  Location: [facility] OR;  Service: Ophthalmology;  Laterality: Left;  Catalys     DILATION AND CURETTAGE OF UTERUS      Hx of precancerous cells?    ENDOSCOPIC ULTRASOUND OF UPPER GASTROINTESTINAL TRACT N/A 7/5/2019    Procedure: ULTRASOUND, UPPER GI TRACT, ENDOSCOPIC;  Surgeon: Kev Calderon MD;  Location: [facility] ENDO (2ND FLR);  Service: Endoscopy;  Laterality: N/A;  Pacemaker-Adam   appt confirmed-rb    MYELOGRAPHY N/A 5/4/2021    Procedure: Myelogram  CERVICAL, THORACIC AND LUMBAR;  Surgeon: [facility] Diagnostic Provider;  Location: [facility] OR 2ND FLR;  Service: Radiology;  Laterality: N/A;    NASAL SEPTOPLASTY N/A 12/19/2019    Procedure: SEPTOPLASTY, NOSE;  Surgeon: Martinez Flores III, MD;  Location: [facility];  Service: ENT;  Laterality: N/A;  FOLLOW DR SALVATORE KIMBROUGH PROTOCOL    OPEN REDUCTION AND INTERNAL FIXATION (ORIF) OF FRACTURE OF DISTAL RADIUS Left 1/24/2020    Procedure: ORIF, FRACTURE, RADIUS, DISTAL-left;  Surgeon: Ellen Moe MD;  Location: [facility] OR;  Service: Orthopedics;  Laterality: Left;    POSTERIOR FUSION OF CERVICAL SPINE WITH LAMINECTOMY N/A 11/14/2022    Procedure: LAMINECTOMY, SPINE, CERVICAL, WITH POSTERIOR FUSION C3-C6;  Surgeon: Nicolette Cheek MD;  Location: [facility] OR 2ND FLR;  Service: Neurosurgery;  Laterality: N/A;  TORONTO III, ASA III, BLOOD TYPE AND  SCREEN, NEUROMONITORING EMG SEP MEP, BRACE/HALO [place], BED REGULAR BED, HEADREST [place], POSITION PRONE, SPECIAL EQUIPMENT NIKI [place]LE[place], RADIOLOGY C-ARM, EXT. BONE STIMULATOR BIOMET    REPLACEMENT OF PACEMAKER GENERATOR Left 10/7/2022    Procedure: REPLACEMENT, PACEMAKER GENERATOR;  Surgeon: John Sheets MD;  Location: [facility] EP LAB;  Service: Cardiology;  Laterality: Left;  YAS, SSS, AVB, Dual PPM Gen Change,SJM, MAC ,NC, 3 prep,** Adam dcPPM in situ**    SURGICAL REMOVAL OF NASAL TURBINATE Bilateral 2019    Procedure: EXCISION, NASAL TURBINATE;  Surgeon: Martinez Flores III, MD;  Location: [facility] OR;  Service: ENT;  Laterality: Bilateral;    TONSILLECTOMY      WISDOM TOOTH EXTRACTION       Family History    None       Tobacco Use    Smoking status: Former     Current packs/day: 0.00     Average packs/day: 0.3 packs/day for 15.0 years (3.8 ttl pk-yrs)     Types: Cigarettes     Start date: 1967     Quit date: 1982     Years since quittin.6    Smokeless tobacco: Former   Substance and Sexual Activity    Alcohol use: Yes     Comment: no daily or heavy use, ~4 times per month    Drug use: No    Sexual activity: Not Currently     Partners: Male     Review of Systems  Objective:        There is no height or weight on file to calculate BMI.  Vital Signs (Most Recent):  Temp: 98 °F (36.7 °C) (24 1702)  Pulse: 63 (24 1702)  Resp: 20 (24 1702)  BP: 112/62 (24 1702)  SpO2: 100 % (24 1515) Vital Signs (24h Range):  Temp:  [97.4 °F (36.3 °C)-98.2 °F (36.8 °C)] 98 °F (36.7 °C)  Pulse:  [60-81] 63  Resp:  [16-20] 20  SpO2:  [97 %-100 %] 100 %  BP: (109-154)/(56-80) 112/62           Physical Exam     General: well developed, well nourished, no distress.   Head: normocephalic, repaired laceration to left forehead above orbit, mild periorbital ecchymosis on the left   Neurologic: Alert and oriented. Thought content appropriate.  GCS: Motor: 6/Verbal: 5/Eyes: 4 GCS Total:  "15  Mental Status: Awake, Alert, Oriented x3  Cranial nerves: face symmetric, tongue midline, CN II-XII grossly intact.   Eyes: pupils equal, round, reactive to light with accomodation, EOMI.  Sensory: intact to light touch throughout  Motor Strength:Moves all extremities spontaneously with good tone.  Full strength upper and lower extremities. No abnormal movements seen. Unable to assess right arm strength due to splint     Strength  Deltoids Triceps Biceps Wrist Extension Wrist Flexion Hand    Upper: R 5/5         L 5/5 5/5 5/5 5/5 5/5 5/5     Iliopsoas Quadriceps Knee  Flexion Tibialis  anterior Gastro- cnemius EHL   Lower: R 5/5 5/5 5/5 5/5 5/5 5/5    L 5/5 5/5 5/5 5/5 5/5 5/5     DTR's - 2 + and symmetric in UE and LE  Pronator Drift: no drift noted  Finger-to-nose: Intact bilaterally  Maria: positive bilaterally  Clonus: absent  Babinski: absent  Straight leg raise: negative      Significant Labs:  Recent Labs   Lab 02/22/24 2119 02/23/24  0405   GLU 91 102    139   K 4.1 3.9    108   CO2 23 22*   BUN 22 20   CREATININE 1.0 0.9   CALCIUM 10.0 9.4   MG 2.2 2.1     Recent Labs   Lab 02/22/24 2119 02/23/24  0405   WBC 8.25 10.48   HGB 11.8* 10.7*   HCT 35.0* 31.6*    109*     No results for input(s): "LABPT", "INR", "APTT" in the last 48 hours.  Microbiology Results (last 7 days)       ** No results found for the last 168 hours. **          All pertinent labs from the last 24 hours have been reviewed.    Significant Diagnostics:  I have reviewed and interpreted all pertinent imaging results/findings within the past 24 hours.  Assessment/Plan:     Cervical myelopathy  72 yo female with history of known cervical myelopathy s/p C3-6 PCF 11/2022 who presented to the ED s/p 2 falls yesterday. Found to have right ulnar fracture splinted by ortho. CTH and CT cervical spine without acute changes     -Pt remains neurologically stable at her baseline  -Imaging reviewed. CT cervical spine shows " stable hardware  -Pt unable to get MRIs due to pacemaker. Discussed CT myelogram in her appointment with Dr. Cheek yesterday and initially deferred. Today, she reports she is willing to obtain outpatient myelogram at this point to better assess for spinal stenosis  -No acute Neurosurgical intervention warranted at this time  -Pain control per ED  -Please call NSGY on call with any further questions or concerns. Will arrange outpatient follow up with CT myelogram   -Discussed with Dr. Adia Espinoza PA-C  [facility]  [facility]

## 2024-02-23 NOTE — ASSESSMENT & PLAN NOTE
Chronic, controlled. Latest blood pressure and vitals reviewed-     Temp:  [97.4 °F (36.3 °C)-98.1 °F (36.7 °C)]   Pulse:  [60-68]   Resp:  [16-18]   BP: (111-154)/(67-80)   SpO2:  [99 %-100 %] .   Home meds for hypertension were reviewed and noted below.   Hypertension Medications               losartan (COZAAR) 25 MG tablet Take 1 tablet (25 mg total) by mouth once daily.    propranoloL (INDERAL) 20 MG tablet Take 1 tablet (20 mg total) by mouth 2 (two) times daily.            While in the hospital, will manage blood pressure as follows; Continue home antihypertensive regimen    Will utilize p.r.n. blood pressure medication only if patient's blood pressure greater than 180/110 and she develops symptoms such as worsening chest pain or shortness of breath.

## 2024-02-23 NOTE — CARE UPDATE
Manisha Wynne was admitted to \[facility]\"" medicine for management of recurrent falls. Noted to have right olecranon fracture related to most recent fall, evaluated by ortho who placed patient in a splint and with non-weight bearing precautions. Noted to have ankle clonus and positive babinski's sign on exam - NSGY consulted for evaluation and management recs. PT/OT consulted for possible placement recs.       Jennie Andrade PA-C  Hospital Medicine [facility] [address]       5:00 PM update: left elbow xray with anterior displaced fat pad and posterior fat pad. Discussed with ortho, recommended CT  left elbow to definitively rule out fracture. Imaging ordered. Will monitor patient overnight.

## 2024-02-23 NOTE — ASSESSMENT & PLAN NOTE
NORA LYNCH  Posterior long arm splint to remain in place   Will plan for followup in ortho trauma clinic at discharge

## 2024-02-23 NOTE — ED TRIAGE NOTES
Chief Complaint   Patient presents with    Fall     Pt. Had just been discharged and was leaving facility.  Was walking and per pt. Her shoes (crocs) caught and she tripped.  Denies syncopal episode.  No LOC or NV.  Pt. Has large triangle size lac to L forehead and lac to R side of head.  Pt. Takes asprin daily.  No other s/s or complaints.      Laceration    Head Injury     APPEARANCE: No acute distress.    NEURO: Awake, alert, appropriate for age  HEENT: as noted above.  Head symmetrical. No obvious deformity  RESPIRATORY: Airway is open and patent. Respirations are spontaneous on room air.   NEUROVASCULAR: All extremities are warm and pink with capillary refill less than 3 seconds.   MUSCULOSKELETAL: Moves all extremities, wiggling toes and moving hands.   SKIN: as noted above.  Warm and dry, adequate turgor, mucus membranes moist and pink  SOCIAL: Patient is accompanied by family.   Will continue to monitor.

## 2024-02-23 NOTE — ED NOTES
Telemetry Verification   Patient placed on Telemetry Box  Verified with War Room  Box # [id number]   Monitor Tech briana   Rate 62   Rhythm ns

## 2024-02-23 NOTE — SUBJECTIVE & OBJECTIVE
Past Medical History:   Diagnosis Date    Abnormal Pap smear     Atrial fibrillation     AV block, 1st degree 07/25/2012    C. difficile diarrhea     RESOLVED    Cataract     Depression 07/24/2012    Facet arthritis of lumbar region 03/31/2015    Falls     3 falls in the last 6 mos--noted 6/19/19    Hyperlipidemia     Hypertension 07/24/2012    Neuropathy     Other specified cardiac dysrhythmias(427.89)     Sleep apnea     Syncope 07/24/2012    Tremors of nervous system     hands bilaterally       Past Surgical History:   Procedure Laterality Date    APPLICATION OF CARTILAGE GRAFT Bilateral 12/19/2019    Procedure: APPLICATION, CARTILAGE GRAFT AURICULAR JEZ;  Surgeon: Martinez Flores III, MD;  Location: [facility];  Service: ENT;  Laterality: Bilateral;    CARDIAC PACEMAKER PLACEMENT  09/07/2012    [id number]    CATARACT EXTRACTION W/  INTRAOCULAR LENS IMPLANT Right 5/16/2022    Procedure: EXTRACTION, CATARACT, WITH IOL INSERTION;  Surgeon: Ines Aguilera MD;  Location: [facility];  Service: Ophthalmology;  Laterality: Right;  Catalys     CATARACT EXTRACTION W/  INTRAOCULAR LENS IMPLANT Left 6/20/2022    Procedure: EXTRACTION, CATARACT, WITH IOL INSERTION;  Surgeon: Ines Aguilera MD;  Location: [facility];  Service: Ophthalmology;  Laterality: Left;  Catalys     DILATION AND CURETTAGE OF UTERUS      Hx of precancerous cells?    ENDOSCOPIC ULTRASOUND OF UPPER GASTROINTESTINAL TRACT N/A 7/5/2019    Procedure: ULTRASOUND, UPPER GI TRACT, ENDOSCOPIC;  Surgeon: Kev Calderon MD;  Location: [facility] (2ND FLR);  Service: Endoscopy;  Laterality: N/A;  Pacemaker-Adam   appt confirmed-rb    MYELOGRAPHY N/A 5/4/2021    Procedure: Myelogram  CERVICAL, THORACIC AND LUMBAR;  Surgeon: [facility] Diagnostic Provider;  Location: [facility] 2ND FLR;  Service: Radiology;  Laterality: N/A;    NASAL SEPTOPLASTY N/A 12/19/2019    Procedure: SEPTOPLASTY, NOSE;  Surgeon: Martinez Flores III, MD;  Location: [facility];  Service: ENT;   Laterality: N/A;  FOLLOW DR SALVATORE KIMBROUGH PROTOCOL    OPEN REDUCTION AND INTERNAL FIXATION (ORIF) OF FRACTURE OF DISTAL RADIUS Left 1/24/2020    Procedure: ORIF, FRACTURE, RADIUS, DISTAL-left;  Surgeon: Ellen Moe MD;  Location: [facility] OR;  Service: Orthopedics;  Laterality: Left;    POSTERIOR FUSION OF CERVICAL SPINE WITH LAMINECTOMY N/A 11/14/2022    Procedure: LAMINECTOMY, SPINE, CERVICAL, WITH POSTERIOR FUSION C3-C6;  Surgeon: Nicolette Cheek MD;  Location: [address] FLR;  Service: Neurosurgery;  Laterality: N/A;  TORONTO III, ASA III, BLOOD TYPE AND SCREEN, NEUROMONITORING EMG SEP MEP, BRACE/HALO [place], BED REGULAR BED, HEADREST [place], POSITION PRONE, SPECIAL EQUIPMENT NIKI [place], RADIOLOGY C-ARM, EXT. BONE STIMULATOR BIOMET    REPLACEMENT OF PACEMAKER GENERATOR Left 10/7/2022    Procedure: REPLACEMENT, PACEMAKER GENERATOR;  Surgeon: John Sheets MD;  Location: [facility] EP LAB;  Service: Cardiology;  Laterality: Left;  YAS, SSS, AVB, Dual PPM Gen Change,SJM, MAC ,[place], 3 prep,** Adam dcPPM in situ**    SURGICAL REMOVAL OF NASAL TURBINATE Bilateral 12/19/2019    Procedure: EXCISION, NASAL TURBINATE;  Surgeon: Martinez Flores III, MD;  Location: [facility];  Service: ENT;  Laterality: Bilateral;    TONSILLECTOMY      WISDOM TOOTH EXTRACTION         Review of patient's allergies indicates:  No Known Allergies    Current Facility-Administered Medications   Medication    acetaminophen tablet 650 mg    albuterol-ipratropium 2.5 mg-0.5 mg/3 mL nebulizer solution 3 mL    bisacodyL suppository 10 mg    buPROPion TB24 tablet 300 mg    dextrose 10% bolus 125 mL 125 mL    dextrose 10% bolus 250 mL 250 mL    gabapentin capsule 100 mg    glucagon (human recombinant) injection 1 mg    glucose chewable tablet 16 g    glucose chewable tablet 24 g    LIDOcaine (PF) 10 mg/ml (1%) injection 100 mg    losartan tablet 25 mg    melatonin tablet 6 mg    ondansetron disintegrating tablet 8 mg    oxyCODONE immediate  release tablet 5 mg    polyethylene glycol packet 17 g    pravastatin tablet 40 mg    promethazine tablet 25 mg    propranoloL tablet 20 mg    sodium chloride 0.9% flush 10 mL     Current Outpatient Medications   Medication Sig    acetaminophen (TYLENOL) 500 MG tablet Take 2 tablets (1,000 mg total) by mouth every 8 (eight) hours. (Patient taking differently: Take 1,000 mg by mouth 3 (three) times daily as needed.)    aspirin (ADULT LOW DOSE ASPIRIN) 81 MG EC tablet     buPROPion (WELLBUTRIN XL) 300 MG 24 hr tablet Take 1 tablet by mouth once daily.    calcium-vitamin D tablet 600 mg-200 units Take 1 tablet by mouth once daily.    cyanocobalamin (VITAMIN B-12) 1000 MCG tablet Take 100 mcg by mouth once daily.    FLUoxetine 40 MG capsule Take 1 capsule (40 mg total) by mouth once daily.    gabapentin (NEURONTIN) 100 MG capsule One capsule 2 (two) times daily.    lisdexamfetamine (VYVANSE) 70 MG capsule Take 1 capsule (70 mg total) by mouth every morning.    losartan (COZAAR) 25 MG tablet Take 1 tablet (25 mg total) by mouth once daily.    methyl salicylate-menthol 15-10% 15-10 % Crea Apply topically every evening.    mupirocin (BACTROBAN) 2 % ointment Apply topically 3 (three) times daily.    pravastatin (PRAVACHOL) 40 MG tablet Take 1 tablet (40 mg total) by mouth once daily.    PROLIA 60 mg/mL Syrg     propranoloL (INDERAL) 20 MG tablet Take 1 tablet (20 mg total) by mouth 2 (two) times daily.     Family History    None       Tobacco Use    Smoking status: Former     Current packs/day: 0.00     Average packs/day: 0.3 packs/day for 15.0 years (3.8 ttl pk-yrs)     Types: Cigarettes     Start date: 1967     Quit date: 1982     Years since quittin.6    Smokeless tobacco: Former   Substance and Sexual Activity    Alcohol use: Yes     Comment: no daily or heavy use, ~4 times per month    Drug use: No    Sexual activity: Not Currently     Partners: Male     ROS  Constitutional: Denies fever/chills  Eyes:  Denies change in vision  ENT: Denies sore throat or rhinorrhea   Respiratory: Denies shortness of breath or cough  Cardiovascular: Denies chest pain or palpitations  Gastrointestinal: Denies abdominal pain, nausea, or vomiting  Genitourinary: Denies dysuria and flank pain  Skin: Denies new rash or skin lesions   Allergic/Immunologic: Denies adverse reactions to current medications  Neurological: Denies headaches or dizziness  Musculoskeletal: see HPI     Objective:     Vital Signs (Most Recent):  Temp: 97.9 °F (36.6 °C) (02/23/24 0016)  Pulse: 63 (02/23/24 0016)  Resp: 18 (02/22/24 2054)  BP: (!) 145/80 (02/23/24 0016)  SpO2: 100 % (02/23/24 0016) Vital Signs (24h Range):  Temp:  [97.4 °F (36.3 °C)-98.1 °F (36.7 °C)] 97.9 °F (36.6 °C)  Pulse:  [60-68] 63  Resp:  [16-18] 18  SpO2:  [99 %-100 %] 100 %  BP: (111-154)/(67-80) 145/80           There is no height or weight on file to calculate BMI.    No intake or output data in the 24 hours ending 02/23/24 0323     Ortho/SPM Exam  Awake/alert/oriented x3, No acute distress, Afebrile, Vital signs stable  Normocephalic, Atraumatic  Good inspiratory effort with unlaboured breathing    Motor            RIGHT  LEFT  Deltoid(C5)        5/5    5/5  Biceps(C5)         5/5    5/5  Brachioradialis(C6)       5/5    5/5   Extensor Carpi Radialis Longus(C6)    5/5    5/5   Triceps(C7)       N/a    5/5     Flexor Carpi Radialis(C7)     5/5    5/5  Flexor Digitorum Superficialis(C8)    5/5    5/5  Interossei(T1)       5/5    5/5     Hip Flexion (L3)                                     5/5                  5/5  Knee Extension (L4)                              5/5                  5/5  Tib Ant (L5)                                            5/5                  5/5       EHL (L5)                                                 5/5                  5/5  Gastrocs (S1)                                         5/5                  5/5  Peroneals (S1)                                       5/5                   5/5       FHL (S2)                                                5/5                  5/5    Sensation   All dermatomes (C1-S5) normal except for L4-S1 at BLE    Reflexes       RIGHT  LEFT  Triceps           2+     2+  Biceps           2+     2+  Brachioradialis          2+           2+  Patella        2+     2+  Achilles       2+     2+  Hoffmans's        Pos    Pos  Babinski      Pos    Pos   Clonus       Pos    Pos     Pulses       RIGHT  LEFT  Radial        2+     2+  Dorsalis Pedis       2+     2+  Post Tib       2+     2+     Significant Labs: All pertinent labs within the past 24 hours have been reviewed.    Significant Imaging: I have reviewed and interpreted all pertinent imaging results/findings.  Xrays of the right elbow showing a displaced fracture of the olecranon

## 2024-02-23 NOTE — PT/OT/SLP EVAL
Physical Therapy Co-Evaluation and Co-Treatment    Patient Name:  Manisha Wynne   MRN:  [id number]  Admit Date: 2/22/2024  Admitting Diagnosis:  Fall   Length of Stay: 0 days  Recent Surgery: * No surgery found *      Recommendations:     Discharge Recommendations:    Low intensity therapy   Discharge Equipment Recommendations: cane, straight   Barriers to discharge: None    Appropriate transfer level with nursing staff: Ambulatory with CGA    Plan:     During this hospitalization, patient to be seen 3 x/week to address the identified rehab impairments via gait training, therapeutic activities, therapeutic exercises, neuromuscular re-education and progress towards the established goals.  Plan of Care Expires:  03/23/24  Plan of Care Reviewed with: patient    Assessment:     Manisha Wynne is a 73 y.o. female admitted with a medical diagnosis of Fall. Pt presents s/p mechanical fall resulting in R olecranon fx. Pt NWB RUE and in sling upon PT/OT arrival for evaluation.  Pt reports Ind with mobility and ADLs PTA but reports impaired balance at times. Patient presents today with good BLE strength but with minor instability during gait trial requiring CGA for safety. At this time, patient not functioning at her baseline PLOF and is a fall risk. Patient would benefit from skilled therapy services to maximize safety and independence, increase activity tolerance, decrease fall risk, decrease caregiver burden, improve QOL, improve patient's functional mobility, and decrease risk of contractures and pressure sores. Patient currently demonstrates a need for low intensity therapy on a scheduled basis secondary to a decline in functional status due to injury    Problem List: impaired endurance, impaired self care skills, impaired functional mobility, gait instability, impaired balance, decreased upper extremity function, decreased lower extremity function, decreased safety awareness, pain, decreased ROM,  "orthopedic precautions, impaired skin.  Rehab Prognosis: Good; patient would benefit from acute skilled PT services to address these deficits and reach maximum level of function.      Goals:   Multidisciplinary Problems       Physical Therapy Goals          Problem: Physical Therapy    Goal Priority Disciplines Outcome Goal Variances Interventions   Physical Therapy Goal     PT, PT/OT Ongoing, Progressing     Description: Goals to be met by: 3/23/24     Patient will increase functional independence with mobility by performin. Supine to sit with Kay  2. Sit to stand transfer with Kay  3. Bed to chair transfer with Modified Kay using LRAD  4. Gait  x 300 feet with Modified Kay using LRAD.   5. Ascend/descend 3 stair with right Handrails Modified Kay using LRAD.   6. Lower extremity exercise program x15 reps per handout, with independence                         Subjective     RN notified prior to session. No one present upon PT entrance into room. Patient agreeable to PT evaluation.    Chief Complaint: "I just hurt everywhere"  Patient/Family Comments/goals: get better  Pain/Comfort:  Pain Rating 1: 8/10  Location - Orientation 1: generalized (reports pain everywhere)  Pain Addressed 1: Reposition, Distraction  Pain Rating Post-Intervention 1: 8/10    Social History:  Residence: lives alone 1-story duplex with 3 PARKER and R HR. Pt's bathroom has a separate tub and shower with a bath bench and GB in shower.  Support available: none, has not needed  Equipment Owned (not using): walker, rolling  Equipment Used: bath bench, grab bar, raised toilet (walking sticks)  Prior level of function: independent - mod Ind with occasionally using walking sticks for ambulation; Ind with ADLs. Reports balance has been a little impaired lately       Patient reports they will have assistance from unknown upon discharge.    Objective:     Additional staff present: OT; OT for co-evaluation " due to suspected patient need for two skilled therapists to appropriately assess patient's functional deficits as well as ensure patient safety, accommodate for limited activity tolerance, and provide appropriate, skilled assistance to maximize functional potential during evaluation.    Patient found  on stretcher in ED hallway  with: telemetry     General Precautions: Standard, Cardiac fall   Orthopedic Precautions:RUE non weight bearing   Braces: UE Sling (RUE)   There is no height or weight on file to calculate BMI.  Oxygen Device: Room Air  Vitals: BP (!) 121/56 (BP Location: Right arm, Patient Position: Lying)   Pulse 60   Temp 98 °F (36.7 °C) (Oral)   Resp 16   LMP 10/01/2003   SpO2 100%       Exams:    Cognition:  Oriented X 4, Alert, and Cooperative  Command following: Follows multistep verbal commands  Communication: clear/fluent    Sensation:   Light touch sensation: Pt reported generalized numbness/tingling of B feet    Gross Motor Coordination: No deficits noted during functional mobility tasks     Edema/Skin Integrity: None noted; Bruising/bleeding of face    Postural examination/scapula alignment: Rounded shoulder and Head forward    Lower Extremity Range of Motion:  Right Lower Extremity: WFL  Left Lower Extremity: WFL    Lower Extremity Strength:  Right Lower Extremity: WFL  Left Lower Extremity: WFL      Functional Mobility:    Bed Mobility:  Supine > Sit with minimum assistance  Sit > Supine with minimum assistance    Transfers:   Sit <> Stand Transfer: Contact Guard Assistance x 1 trials from EOB with no AD; minimum assistance x1 trial from toilet with no AD             Gait:  Distance: 50' + 50'  Assistance level: Contact Guard Assistance  Assistive Device: none  Gait Deviation(s): occasional unsteady gait, decreased step length, flexed posture, and decreased aron  all lines remained intact throughout ambulation trial  Comments: Pt with minor instability but no overt LOB during trial.      Balance:  Sitting Balance  Static: stand by assistance  Dynamic: stand by assistance  Standing:  Static: stand by assistance ~2 minutes at toilet for donning/doffing pants. Needed assist with pants but no LOB  Dynamic: contact guard assistance    Outcome Measures:  AM-PAC 6 CLICK MOBILITY  Turning over in bed (including adjusting bedclothes, sheets and blankets)?: 3  Sitting down on and standing up from a chair with arms (e.g., wheelchair, bedside commode, etc.): 3  Moving from lying on back to sitting on the side of the bed?: 3  Moving to and from a bed to a chair (including a wheelchair)?: 3  Need to walk in hospital room?: 3  Climbing 3-5 steps with a railing?: 3  Basic Mobility Total Score: 18       Education:  Time provided for education, counseling and discussion of health disposition in regards to patient's current status  All questions answered within PT scope of practice and to patient's satisfaction  PT role in POC to address current functional deficits  Call nursing/pct to transfer to chair/use bathroom. Pt stated understanding.  Educated on DME rec of SPC 2/2 recent reports of instability    Patient left  on stretcher supine in hallway  with all lines intact and call button in reach.      History:     Past Medical History:   Diagnosis Date    Abnormal Pap smear     Atrial fibrillation     AV block, 1st degree 07/25/2012    C. difficile diarrhea     RESOLVED    Cataract     Depression 07/24/2012    Facet arthritis of lumbar region 03/31/2015    Falls     3 falls in the last 6 mos--noted 6/19/19    Hyperlipidemia     Hypertension 07/24/2012    Neuropathy     Other specified cardiac dysrhythmias(427.89)     Sleep apnea     Syncope 07/24/2012    Tremors of nervous system     hands bilaterally       Past Surgical History:   Procedure Laterality Date    APPLICATION OF CARTILAGE GRAFT Bilateral 12/19/2019    Procedure: APPLICATION, CARTILAGE GRAFT AURICULAR JEZ;  Surgeon: Martinez Flores III, MD;   Location: [facility] OR;  Service: ENT;  Laterality: Bilateral;    CARDIAC PACEMAKER PLACEMENT  09/07/2012    [id number]    CATARACT EXTRACTION W/  INTRAOCULAR LENS IMPLANT Right 5/16/2022    Procedure: EXTRACTION, CATARACT, WITH IOL INSERTION;  Surgeon: Ines Aguilera MD;  Location: [facility] OR;  Service: Ophthalmology;  Laterality: Right;  Catalys     CATARACT EXTRACTION W/  INTRAOCULAR LENS IMPLANT Left 6/20/2022    Procedure: EXTRACTION, CATARACT, WITH IOL INSERTION;  Surgeon: Ines Aguilera MD;  Location: [facility] OR;  Service: Ophthalmology;  Laterality: Left;  Catalys     DILATION AND CURETTAGE OF UTERUS      Hx of precancerous cells?    ENDOSCOPIC ULTRASOUND OF UPPER GASTROINTESTINAL TRACT N/A 7/5/2019    Procedure: ULTRASOUND, UPPER GI TRACT, ENDOSCOPIC;  Surgeon: Kev Calderon MD;  Location: [facility] (2ND FLR);  Service: Endoscopy;  Laterality: N/A;  Pacemaker-Adam   appt confirmed-rb    MYELOGRAPHY N/A 5/4/2021    Procedure: Myelogram  CERVICAL, THORACIC AND LUMBAR;  Surgeon: [facility] Diagnostic Provider;  Location: [facility] 2ND FLR;  Service: Radiology;  Laterality: N/A;    NASAL SEPTOPLASTY N/A 12/19/2019    Procedure: SEPTOPLASTY, NOSE;  Surgeon: Martinez Flores III, MD;  Location: [facility];  Service: ENT;  Laterality: N/A;  FOLLOW DR SALVATORE KIMBROUGH PROTOCOL    OPEN REDUCTION AND INTERNAL FIXATION (ORIF) OF FRACTURE OF DISTAL RADIUS Left 1/24/2020    Procedure: ORIF, FRACTURE, RADIUS, DISTAL-left;  Surgeon: Ellen Moe MD;  Location: [facility];  Service: Orthopedics;  Laterality: Left;    POSTERIOR FUSION OF CERVICAL SPINE WITH LAMINECTOMY N/A 11/14/2022    Procedure: LAMINECTOMY, SPINE, CERVICAL, WITH POSTERIOR FUSION C3-C6;  Surgeon: Nicolette Cheek MD;  Location: [facility] OR 2ND FLR;  Service: Neurosurgery;  Laterality: N/A;  TORONTO III, ASA III, BLOOD TYPE AND SCREEN, NEUROMONITORING EMG SEP MEP, BRACE/HALO [place], BED REGULAR BED, HEADREST [place], POSITION PRONE, SPECIAL EQUIPMENT AMPARO [place]  NIKI JESUS, RADIOLOGY C-ARM, EXT. BONE STIMULATOR BIOMET    REPLACEMENT OF PACEMAKER GENERATOR Left 10/7/2022    Procedure: REPLACEMENT, PACEMAKER GENERATOR;  Surgeon: John Sheets MD;  Location: [facility] EP LAB;  Service: Cardiology;  Laterality: Left;  YAS, SSS, AVB, Dual PPM Gen Change,SJM, MAC ,[place], 3 prep,** Adam dcPPM in situ**    SURGICAL REMOVAL OF NASAL TURBINATE Bilateral 2019    Procedure: EXCISION, NASAL TURBINATE;  Surgeon: Martinez Flores III, MD;  Location: [facility] OR;  Service: ENT;  Laterality: Bilateral;    TONSILLECTOMY      WISDOM TOOTH EXTRACTION         Family History   Adopted: Yes   Problem Relation Age of Onset    Amblyopia Neg Hx     Blindness Neg Hx     Cataracts Neg Hx     Glaucoma Neg Hx     Macular degeneration Neg Hx     Retinal detachment Neg Hx        Social History     Socioeconomic History    Marital status:    Occupational History    Occupation: /Rabbi     Employer: [employer]   Tobacco Use    Smoking status: Former     Current packs/day: 0.00     Average packs/day: 0.3 packs/day for 15.0 years (3.8 ttl pk-yrs)     Types: Cigarettes     Start date: 1967     Quit date: 1982     Years since quittin.6    Smokeless tobacco: Former   Substance and Sexual Activity    Alcohol use: Yes     Comment: no daily or heavy use, ~4 times per month    Drug use: No    Sexual activity: Not Currently     Partners: Male     Social Determinants of Health     Financial Resource Strain: Low Risk  (2024)    Overall Financial Resource Strain (CARDIA)     Difficulty of Paying Living Expenses: Not very hard   Food Insecurity: No Food Insecurity (2024)    Hunger Vital Sign     Worried About Running Out of Food in the Last Year: Never true     Ran Out of Food in the Last Year: Never true   Transportation Needs: No Transportation Needs (2024)    PRAPARE - Transportation     Lack of Transportation (Medical): No     Lack of Transportation  (Non-Medical): No   Physical Activity: Insufficiently Active (1/29/2024)    Exercise Vital Sign     Days of Exercise per Week: 1 day     Minutes of Exercise per Session: 60 min   Stress: No Stress Concern Present (1/29/2024)    [employer] [employer] - Occupational Stress Questionnaire     Feeling of Stress : Only a little   Social Connections: Moderately Integrated (1/29/2024)    Social Connection and Isolation Panel [NHANES]     Frequency of Communication with Friends and Family: Three times a week     Frequency of Social Gatherings with Friends and Family: Once a week     Attends [employer] Services: More than 4 times per year     Active Member of Clubs or Organizations: Yes     Attends Club or Organization Meetings: More than 4 times per year     Marital Status:    Housing Stability: Low Risk  (1/29/2024)    Housing Stability Vital Sign     Unable to Pay for Housing in the Last Year: No     Number of Places Lived in the Last Year: 2     Unstable Housing in the Last Year: No       Time Tracking:     PT Received On: 02/23/24  PT Start Time: 1200     PT Stop Time: 1216  PT Total Time (min): 16 min     Billable Minutes: Evaluation 8 minutes and Therapeutic Activity 8 minutes    2/23/2024

## 2024-02-23 NOTE — ASSESSMENT & PLAN NOTE
74 yo female with history of known cervical myelopathy s/p C3-6 PCF 11/2022 who presented to the ED s/p 2 falls yesterday.     -Pt remains neurologically stable at her baseline  -Imaging reviewed. CT cervical spine shows stable hardware  -Pt unable to get MRIs due to pacemaker. Discussed CT myelogram in her appointment with Dr. Cheek yesterday and initially deferred. She repots she is willing to obtain outpatient myelogram at this point to better assess for spinal stenosis  -No acute Neurosurgical intervention warranted at this time  -Please call NSGY on call with any further questions or concerns. Will arrange outpatient follow up with CT myelogram   -Discussed with Dr. Cheek

## 2024-02-23 NOTE — PT/OT/SLP EVAL
"Occupational Therapy   Evaluation    Name: Manisha Wynne  MRN: [id number]  Admitting Diagnosis: Fall  Recent Surgery: * No surgery found *      Recommendations:     Discharge Recommendations: Low Intensity Therapy  Discharge Equipment Recommendations:  cane, straight  Barriers to discharge:   none    Assessment:     Manisha Wynne is a 73 y.o. female with a medical diagnosis of Fall.  She presents with the following performance deficits affecting function: impaired endurance, impaired functional mobility, gait instability, impaired balance, decreased upper extremity function, pain, decreased ROM, impaired cardiopulmonary response to activity, orthopedic precautions. Patient participated well with therapy, reporting that her mobility is "very close" to her baseline/PLOF as she "is sometimes off balance and uses a walking stick." Patient reports she will utilize pull over dresses, maintained NWB precautions without difficulty, will continue to benefit from therapy during this admission per OT POC.     Rehab Prognosis: Good; patient would benefit from acute skilled OT services to address these deficits and reach maximum level of function.       Plan:     Patient to be seen   to address the above listed problems via self-care/home management, therapeutic activities, therapeutic exercises  Plan of Care Expires:    Plan of Care Reviewed with: patient    Subjective     Chief Complaint: "Everywhere." Re: pain  Patient/Family Comments/goals: Return home    Occupational Profile:  Living Environment: Pt lives alone, duplex, 3STE with R HR, t/s combo with bath bench and grab bars   Previous level of function: Independent  Roles and Routines:   Equipment Used at Home: bath bench, walker, rolling, raised toilet (walking sticks)  Assistance upon Discharge: "Has people that can help'     Pain/Comfort:  Pain Rating 1: 8/10  Location - Orientation 1: generalized  Pain Addressed 1: Reposition, Distraction  Pain " Rating Post-Intervention 1: 8/10    Patients cultural, spiritual, Restorationist conflicts given the current situation: no    Objective:     Communicated with: Nursing  prior to session.  Patient found HOB elevated with telemetry upon OT entry to room.    General Precautions: Standard, fall  Orthopedic Precautions: RUE non weight bearing  Braces:  (UE sling)  Respiratory Status: Room air    Occupational Performance:    Bed Mobility:    Patient completed Scooting/Bridging with minimum assistance  Patient completed Supine to Sit with minimum assistance  Patient completed Sit to Supine with minimum assistance    Functional Mobility/Transfers:  Patient completed Sit <> Stand Transfer with contact guard assistance  with  no assistive device   Patient completed Toilet Transfer Step Transfer technique with minimum assistance with  no AD  Functional Mobility: CGA to complete HH distance (~50 ft) x 2 from stretcher to hallway bathroom    Activities of Daily Living:  Grooming: stand by assistance standing at sink in bathroom  Toileting: moderate assistance for clothing management as patient with pants on; due to impaired BUE coordination/injury to dominant hand     Cognitive/Visual Perceptual:  Cognitive/Psychosocial Skills:     -       Oriented to: Person, Place, Time, and Situation   -       Follows Commands/attention:Follows multistep  commands  -       Safety awareness/insight to disability: intact   -       Mood/Affect/Coping skills/emotional control: Appropriate to situation    Physical Exam:  Postural examination/scapula alignment:    -       No postural abnormalities identified  Upper Extremity Range of Motion:     -       Right Upper Extremity: NT due to sling, patients digits ROM without difficulty  -       Left Upper Extremity: WFL   Strength:    -       Right Upper Extremity: WFL  -       Left Upper Extremity: WFL    AMPAC 6 Click ADL:  AMPAC Total Score: 15    Treatment & Education:  -Evaluation completed, plan  of care established.  -Patient and family educated on roles/goals of OT and POC.  -White board updated.  -Therapist provided time for questions and answered within scope of practice.  -Patient educated on importance of EOB/OOB activity to maximize recovery.    Patient left HOB elevated with all lines intact and call button in reach    GOALS:   Multidisciplinary Problems       Occupational Therapy Goals          Problem: Occupational Therapy    Goal Priority Disciplines Outcome Interventions   Occupational Therapy Goal     OT, PT/OT Ongoing, Progressing    Description: Goals to be met by: 3/23/24     Patient will increase functional independence with ADLs by performing:    UE Dressing with Antonito.  LE Dressing with Antonito.  Grooming while standing at sink with Modified [place].  Toileting from toilet with Modified [place] for hygiene and clothing management.   Supine to sit with Anton[place].  Step transfer with Modified [place]  Toilet transfer to toilet with Modified [place].                         History:     Past Medical History:   Diagnosis Date    Abnormal Pap smear     Atrial fibrillation     AV block, 1st degree 07/25/2012    C. difficile diarrhea     RESOLVED    Cataract     Depression 07/24/2012    Facet arthritis of lumbar region 03/31/2015    Falls     3 falls in the last 6 mos--noted 6/19/19    Hyperlipidemia     Hypertension 07/24/2012    Neuropathy     Other specified cardiac dysrhythmias(427.89)     Sleep apnea     Syncope 07/24/2012    Tremors of nervous system     hands bilaterally         Past Surgical History:   Procedure Laterality Date    APPLICATION OF CARTILAGE GRAFT Bilateral 12/19/2019    Procedure: APPLICATION, CARTILAGE GRAFT AURICULAR JEZ;  Surgeon: Martinez Flores III, MD;  Location: [facility];  Service: ENT;  Laterality: Bilateral;    CARDIAC PACEMAKER PLACEMENT  09/07/2012    [id number]    CATARACT EXTRACTION W/  INTRAOCULAR LENS  IMPLANT Right 5/16/2022    Procedure: EXTRACTION, CATARACT, WITH IOL INSERTION;  Surgeon: Ines Aguilera MD;  Location: [facility] OR;  Service: Ophthalmology;  Laterality: Right;  Catalys     CATARACT EXTRACTION W/  INTRAOCULAR LENS IMPLANT Left 6/20/2022    Procedure: EXTRACTION, CATARACT, WITH IOL INSERTION;  Surgeon: Ines Aguilera MD;  Location: [facility] OR;  Service: Ophthalmology;  Laterality: Left;  Catalys     DILATION AND CURETTAGE OF UTERUS      Hx of precancerous cells?    ENDOSCOPIC ULTRASOUND OF UPPER GASTROINTESTINAL TRACT N/A 7/5/2019    Procedure: ULTRASOUND, UPPER GI TRACT, ENDOSCOPIC;  Surgeon: Kev Calderon MD;  Location: [facility] (2ND FLR);  Service: Endoscopy;  Laterality: N/A;  Pacemaker-Adam   appt confirmed-rb    MYELOGRAPHY N/A 5/4/2021    Procedure: Myelogram  CERVICAL, THORACIC AND LUMBAR;  Surgeon: [facility] Diagnostic Provider;  [facility];  Service: Radiology;  Laterality: N/A;    NASAL SEPTOPLASTY N/A 12/19/2019    Procedure: SEPTOPLASTY, NOSE;  Surgeon: Martinez Flores III, MD;  Location: [facility];  Service: ENT;  Laterality: N/A;  FOLLOW DR SALVATORE KIMBROUGH PROTOCOL    OPEN REDUCTION AND INTERNAL FIXATION (ORIF) OF FRACTURE OF DISTAL RADIUS Left 1/24/2020    Procedure: ORIF, FRACTURE, RADIUS, DISTAL-left;  Surgeon: Ellen Moe MD;  Location: [facility];  Service: Orthopedics;  Laterality: Left;    POSTERIOR FUSION OF CERVICAL SPINE WITH LAMINECTOMY N/A 11/14/2022    Procedure: LAMINECTOMY, SPINE, CERVICAL, WITH POSTERIOR FUSION C3-C6;  Surgeon: Nicolette Cheek MD;  [address];  Service: Neurosurgery;  Laterality: N/A;  TORONTO III, ASA III, BLOOD TYPE AND SCREEN, NEUROMONITORING EMG SEP MEP, BRACE/HALO [place], BED REGULAR BED, HEADREST [place], POSITION PRONE, SPECIAL EQUIPMENT NIKI MIDDLETON, RADIOLOGY C-ARM, EXT. BONE STIMULATOR BIOMET    REPLACEMENT OF PACEMAKER GENERATOR Left 10/7/2022    Procedure: REPLACEMENT, PACEMAKER GENERATOR;  Surgeon: John MAX  MD Sudeep;  Location: [facility] EP LAB;  Service: Cardiology;  Laterality: Left;  YAS, SSS, AVB, Dual PPM Gen Change,SJM, MAC ,OR, 3 prep,** Adam dcPPM in situ**    SURGICAL REMOVAL OF NASAL TURBINATE Bilateral 12/19/2019    Procedure: EXCISION, NASAL TURBINATE;  Surgeon: Martinez Flores III, MD;  Location: [facility] OR;  Service: ENT;  Laterality: Bilateral;    TONSILLECTOMY      WISDOM TOOTH EXTRACTION         Time Tracking:     OT Date of Treatment: 02/23/24  OT Start Time: 1200  OT Stop Time: 1216  OT Total Time (min): 16 min    Billable Minutes:Evaluation 1 unit  Self Care/Home Management 1 unit    2/23/2024

## 2024-02-23 NOTE — CONSULTS
"[facility] Emergency Dept  Orthopedics  Consult Note    Patient Name: Manisha Wynne  MRN: [id number]  Admission Date: 2/22/2024  Hospital Length of Stay: 0 days  Attending Provider: Kami Cheek MD  Primary Care Provider: Iris Blue MD    Patient information was obtained from patient and ER records.     Inpatient consult to Orthopedic Surgery  Consult performed by: KERMIT Rogers MD  Consult ordered by: Olesya Ozuna MD        Subjective:     Principal Problem:Fall    Chief Complaint:   Chief Complaint   Patient presents with    Fall     Pt. Had just been discharged and was leaving facility.  Was walking and per pt. Her shoes (crocs) caught and she tripped.  Denies syncopal episode.  No LOC or NV.  Pt. Has large triangle size lac to L forehead and lac to R side of head.  Pt. Takes asprin daily.  No other s/s or complaints.      Laceration    Head Injury        HPI: Manisha Wynne is a 73 y.o. female with a hx of HTN, Afib, CKD, lumbar stenosis presents to the ED s/p fall x 2. Patient states she originally came to the hospital for a NSGY appt to be evaluated for cervical myelopathy and had a mechanical fall when leaving. Experienced immediate pain/swelling at the right elbow.  Denies LOC but endorses head trauma. Patient has experienced multiple falls at home over the past few months and states she feels "unsteady" while walking.  Endorses constant numbness at her feet and intermittent numbness at her hands.  Endorses handwriting changes, dropping objects, and balance/gait difficulty.  Denies bowel/ bladder incontinence, saddle anesthesia, weakness at lower extremities.  Ambulates without assistance at baseline but occasionally uses a cane.  Does not smoke and is not on any blood thinners.      Past Medical History:   Diagnosis Date    Abnormal Pap smear     Atrial fibrillation     AV block, 1st degree 07/25/2012    C. difficile diarrhea     RESOLVED    Cataract     Depression " 07/24/2012    Facet arthritis of lumbar region 03/31/2015    Falls     3 falls in the last 6 mos--noted 6/19/19    Hyperlipidemia     Hypertension 07/24/2012    Neuropathy     Other specified cardiac dysrhythmias(427.89)     Sleep apnea     Syncope 07/24/2012    Tremors of nervous system     hands bilaterally       Past Surgical History:   Procedure Laterality Date    APPLICATION OF CARTILAGE GRAFT Bilateral 12/19/2019    Procedure: APPLICATION, CARTILAGE GRAFT AURICULAR JEZ;  Surgeon: Martinez Flores III, MD;  Location: [facility];  Service: ENT;  Laterality: Bilateral;    CARDIAC PACEMAKER PLACEMENT  09/07/2012    [id number]    CATARACT EXTRACTION W/  INTRAOCULAR LENS IMPLANT Right 5/16/2022    Procedure: EXTRACTION, CATARACT, WITH IOL INSERTION;  Surgeon: Ines Aguilera MD;  Location: [facility] OR;  Service: Ophthalmology;  Laterality: Right;  Catalys     CATARACT EXTRACTION W/  INTRAOCULAR LENS IMPLANT Left 6/20/2022    Procedure: EXTRACTION, CATARACT, WITH IOL INSERTION;  Surgeon: Ines Aguilera MD;  Location: [facility];  Service: Ophthalmology;  Laterality: Left;  Catalys     DILATION AND CURETTAGE OF UTERUS      Hx of precancerous cells?    ENDOSCOPIC ULTRASOUND OF UPPER GASTROINTESTINAL TRACT N/A 7/5/2019    Procedure: ULTRASOUND, UPPER GI TRACT, ENDOSCOPIC;  Surgeon: Kev Calderon MD;  Location: [facility] (2ND FLR);  Service: Endoscopy;  Laterality: N/A;  Pacemaker-Adam   appt confirmed-rb    MYELOGRAPHY N/A 5/4/2021    Procedure: Myelogram  CERVICAL, THORACIC AND LUMBAR;  Surgeon: [facility] Diagnostic Provider;  Location: [facility] OR 2ND FLR;  Service: Radiology;  Laterality: N/A;    NASAL SEPTOPLASTY N/A 12/19/2019    Procedure: SEPTOPLASTY, NOSE;  Surgeon: Martinez Flores III, MD;  Location: [facility];  Service: ENT;  Laterality: N/A;  FOLLOW DR SALVATORE KIMBROUGH PROTOCOL    OPEN REDUCTION AND INTERNAL FIXATION (ORIF) OF FRACTURE OF DISTAL RADIUS Left 1/24/2020    Procedure: ORIF, FRACTURE, RADIUS,  DISTAL-left;  Surgeon: Ellen Moe MD;  Location: [facility] OR;  Service: Orthopedics;  Laterality: Left;    POSTERIOR FUSION OF CERVICAL SPINE WITH LAMINECTOMY N/A 11/14/2022    Procedure: LAMINECTOMY, SPINE, CERVICAL, WITH POSTERIOR FUSION C3-C6;  Surgeon: Nicolette Cheek MD;  Location: [address] FLR;  Service: Neurosurgery;  Laterality: N/A;  TORONTO III, ASA III, BLOOD TYPE AND SCREEN, NEUROMONITORING EMG SEP MEP, BRACE/HALO [place], BED REGULAR BED, HEADREST [place], POSITION PRONE, SPECIAL EQUIPMENT NIKI MIDDLETON, RADIOLOGY C-ARM, EXT. BONE STIMULATOR BIOMET    REPLACEMENT OF PACEMAKER GENERATOR Left 10/7/2022    Procedure: REPLACEMENT, PACEMAKER GENERATOR;  Surgeon: John Sheets MD;  Location: [facility] EP LAB;  Service: Cardiology;  Laterality: Left;  YAS, SSS, AVB, Dual PPM Gen Change,SJM, MAC ,PA, 3 prep,** Adam dcPPM in situ**    SURGICAL REMOVAL OF NASAL TURBINATE Bilateral 12/19/2019    Procedure: EXCISION, NASAL TURBINATE;  Surgeon: Martinez Flores III, MD;  Location: [facility] OR;  Service: ENT;  Laterality: Bilateral;    TONSILLECTOMY      WISDOM TOOTH EXTRACTION         Review of patient's allergies indicates:  No Known Allergies    Current Facility-Administered Medications   Medication    acetaminophen tablet 650 mg    albuterol-ipratropium 2.5 mg-0.5 mg/3 mL nebulizer solution 3 mL    bisacodyL suppository 10 mg    buPROPion TB24 tablet 300 mg    dextrose 10% bolus 125 mL 125 mL    dextrose 10% bolus 250 mL 250 mL    gabapentin capsule 100 mg    glucagon (human recombinant) injection 1 mg    glucose chewable tablet 16 g    glucose chewable tablet 24 g    LIDOcaine (PF) 10 mg/ml (1%) injection 100 mg    losartan tablet 25 mg    melatonin tablet 6 mg    ondansetron disintegrating tablet 8 mg    oxyCODONE immediate release tablet 5 mg    polyethylene glycol packet 17 g    pravastatin tablet 40 mg    promethazine tablet 25 mg    propranoloL tablet 20 mg    sodium chloride 0.9% flush 10 mL      Current Outpatient Medications   Medication Sig    acetaminophen (TYLENOL) 500 MG tablet Take 2 tablets (1,000 mg total) by mouth every 8 (eight) hours. (Patient taking differently: Take 1,000 mg by mouth 3 (three) times daily as needed.)    aspirin (ADULT LOW DOSE ASPIRIN) 81 MG EC tablet     buPROPion (WELLBUTRIN XL) 300 MG 24 hr tablet Take 1 tablet by mouth once daily.    calcium-vitamin D tablet 600 mg-200 units Take 1 tablet by mouth once daily.    cyanocobalamin (VITAMIN B-12) 1000 MCG tablet Take 100 mcg by mouth once daily.    FLUoxetine 40 MG capsule Take 1 capsule (40 mg total) by mouth once daily.    gabapentin (NEURONTIN) 100 MG capsule One capsule 2 (two) times daily.    lisdexamfetamine (VYVANSE) 70 MG capsule Take 1 capsule (70 mg total) by mouth every morning.    losartan (COZAAR) 25 MG tablet Take 1 tablet (25 mg total) by mouth once daily.    methyl salicylate-menthol 15-10% 15-10 % Crea Apply topically every evening.    mupirocin (BACTROBAN) 2 % ointment Apply topically 3 (three) times daily.    pravastatin (PRAVACHOL) 40 MG tablet Take 1 tablet (40 mg total) by mouth once daily.    PROLIA 60 mg/mL Syrg     propranoloL (INDERAL) 20 MG tablet Take 1 tablet (20 mg total) by mouth 2 (two) times daily.     Family History    None       Tobacco Use    Smoking status: Former     Current packs/day: 0.00     Average packs/day: 0.3 packs/day for 15.0 years (3.8 ttl pk-yrs)     Types: Cigarettes     Start date: 1967     Quit date: 1982     Years since quittin.6    Smokeless tobacco: Former   Substance and Sexual Activity    Alcohol use: Yes     Comment: no daily or heavy use, ~4 times per month    Drug use: No    Sexual activity: Not Currently     Partners: Male     ROS  Constitutional: Denies fever/chills  Eyes: Denies change in vision  ENT: Denies sore throat or rhinorrhea   Respiratory: Denies shortness of breath or cough  Cardiovascular: Denies chest pain or  palpitations  Gastrointestinal: Denies abdominal pain, nausea, or vomiting  Genitourinary: Denies dysuria and flank pain  Skin: Denies new rash or skin lesions   Allergic/Immunologic: Denies adverse reactions to current medications  Neurological: Denies headaches or dizziness  Musculoskeletal: see HPI     Objective:     Vital Signs (Most Recent):  Temp: 97.9 °F (36.6 °C) (02/23/24 0016)  Pulse: 63 (02/23/24 0016)  Resp: 18 (02/22/24 2054)  BP: (!) 145/80 (02/23/24 0016)  SpO2: 100 % (02/23/24 0016) Vital Signs (24h Range):  Temp:  [97.4 °F (36.3 °C)-98.1 °F (36.7 °C)] 97.9 °F (36.6 °C)  Pulse:  [60-68] 63  Resp:  [16-18] 18  SpO2:  [99 %-100 %] 100 %  BP: (111-154)/(67-80) 145/80           There is no height or weight on file to calculate BMI.    No intake or output data in the 24 hours ending 02/23/24 0323     Ortho/SPM Exam  Awake/alert/oriented x3, No acute distress, Afebrile, Vital signs stable  Normocephalic, Atraumatic  Good inspiratory effort with unlaboured breathing    Motor            RIGHT  LEFT  Deltoid(C5)        5/5    5/5  Biceps(C5)         5/5    5/5  Brachioradialis(C6)       5/5    5/5   Extensor Carpi Radialis Longus(C6)    5/5    5/5   Triceps(C7)       N/a    5/5     Flexor Carpi Radialis(C7)     5/5    5/5  Flexor Digitorum Superficialis(C8)    5/5    5/5  Interossei(T1)       5/5    5/5     Hip Flexion (L3)                                     5/5                  5/5  Knee Extension (L4)                              5/5                  5/5  Tib Ant (L5)                                            5/5                  5/5       EHL (L5)                                                 5/5                  5/5  Gastrocs (S1)                                         5/5                  5/5  Peroneals (S1)                                       5/5                  5/5       FHL (S2)                                                5/5                  5/5    Sensation   All dermatomes (C1-S5) normal  except for L4-S1 at BLE    Reflexes       RIGHT  LEFT  Triceps           2+     2+  Biceps           2+     2+  Brachioradialis          2+           2+  Patella        2+     2+  Achilles       2+     2+  Hoffmans's        Pos    Pos  Babinski      Pos    Pos   Clonus       Pos    Pos     Pulses       RIGHT  LEFT  Radial        2+     2+  Dorsalis Pedis       2+     2+  Post Tib       2+     2+     Significant Labs: All pertinent labs within the past 24 hours have been reviewed.    Significant Imaging: I have reviewed and interpreted all pertinent imaging results/findings.  Xrays of the right elbow showing a displaced fracture of the olecranon   Assessment/Plan:     Cervical myelopathy  Manisha Wynne is a 73 y.o. female with a hx of HTN, Afib w/ non-MRI-compatible pacemaker, CKD, lumbar stenosis, cervical myelopathy s/p C3-6 PCF (Dr. Cheek, 11/14/22), who presents with a displaced fracture of the right olecranon.  Closed, neurovascularly intact at the right upper extremity with extensor mechanism intact.  On exam, patient was noted to have numbness at bilateral lower extremities, positive babinski/sustained clonus bilaterally.  Patient was placed in a posterior long arm splint and admitted to  observation for overnight monitoring.  Recommend non-weightbearing to the right upper extremity and multimodal pain regimen.  Further recommendations regarding patient's symptoms of cervical myelopathy will be made following discussion with staff in the AM.      Closed fracture of olecranon process of right ulna  NWB RUE  Posterior long arm splint to remain in place   Will plan for followup in ortho trauma clinic at discharge         KERMIT Rogers MD  Orthopedics  [facility] - Emergency Dept

## 2024-02-23 NOTE — ASSESSMENT & PLAN NOTE
Manisha Wynne is a 73 y.o. female with a hx of HTN, Afib w/ non-MRI-compatible pacemaker, CKD, lumbar stenosis, cervical myelopathy s/p C3-6 PCF (Dr. Cheek, 11/14/22), who presents with a displaced fracture of the right olecranon.  Closed, neurovascularly intact at the right upper extremity with extensor mechanism intact.  On exam, patient was noted to have numbness at bilateral lower extremities, positive babinski/sustained clonus bilaterally.  Patient was placed in a posterior long arm splint and admitted to  observation for overnight monitoring.  Recommend non-weightbearing to the right upper extremity and multimodal pain regimen.  Further recommendations regarding patient's symptoms of cervical myelopathy will be made following discussion with staff in the AM.

## 2024-02-23 NOTE — HPI
"Manisha Wynne is a 73 y.o. female with a hx of HTN, Afib, CKD, lumbar stenosis presents to the ED s/p fall x 2. Patient states she originally came to the hospital for a NSGY appt to be evaluated for cervical myelopathy and had a mechanical fall when leaving. Experienced immediate pain/swelling at the right elbow.  Denies LOC but endorses head trauma. Patient has experienced multiple falls at home over the past few months and states she feels "unsteady" while walking.  Endorses constant numbness at her feet and intermittent numbness at her hands.  Endorses handwriting changes, dropping objects, and balance/gait difficulty.  Denies bowel/ bladder incontinence, saddle anesthesia, weakness at lower extremities.  Ambulates without assistance at baseline but occasionally uses a cane.  Does not smoke and is not on any blood thinners.    "

## 2024-02-23 NOTE — ED PROVIDER NOTES
Encounter Date: 2/22/2024       History     Chief Complaint   Patient presents with    Fall     Pt. Had just been discharged and was leaving facility.  Was walking and per pt. Her shoes (crocs) caught and she tripped.  Denies syncopal episode.  No LOC or NV.  Pt. Has large triangle size lac to L forehead and lac to R side of head.  Pt. Takes asprin daily.  No other s/s or complaints.      Laceration    Head Injury     Patient is a 72-year-old female with PMH of HTN, HLD who presents after a fall in the ED lobby occurring just pta. She had just been discharged from this ED where she had been evaluated for a fall after her neurosurgery appointment. She has pain to her head and R elbow. She also notes bleeding from her L forehead. She did not have LOC. She has a history of frequent falls and was referred to the neurosurgeon today for myelopathy.    The history is provided by the patient and medical records.     Review of patient's allergies indicates:  No Known Allergies  Past Medical History:   Diagnosis Date    Abnormal Pap smear     Atrial fibrillation     AV block, 1st degree 07/25/2012    C. difficile diarrhea     RESOLVED    Cataract     Depression 07/24/2012    Facet arthritis of lumbar region 03/31/2015    Falls     3 falls in the last 6 mos--noted 6/19/19    Hyperlipidemia     Hypertension 07/24/2012    Neuropathy     Other specified cardiac dysrhythmias(427.89)     Sleep apnea     Syncope 07/24/2012    Tremors of nervous system     hands bilaterally     Past Surgical History:   Procedure Laterality Date    APPLICATION OF CARTILAGE GRAFT Bilateral 12/19/2019    Procedure: APPLICATION, CARTILAGE GRAFT AURICULAR JEZ;  Surgeon: Martinez Flores III, MD;  Location: [facility];  Service: ENT;  Laterality: Bilateral;    CARDIAC PACEMAKER PLACEMENT  09/07/2012    [id number]    CATARACT EXTRACTION W/  INTRAOCULAR LENS IMPLANT Right 5/16/2022    Procedure: EXTRACTION, CATARACT, WITH IOL INSERTION;   Surgeon: Ines Aguilera MD;  Location: [facility] OR;  Service: Ophthalmology;  Laterality: Right;  Catalys     CATARACT EXTRACTION W/  INTRAOCULAR LENS IMPLANT Left 6/20/2022    Procedure: EXTRACTION, CATARACT, WITH IOL INSERTION;  Surgeon: Ines Aguilera MD;  Location: [facility] OR;  Service: Ophthalmology;  Laterality: Left;  Catalys     DILATION AND CURETTAGE OF UTERUS      Hx of precancerous cells?    ENDOSCOPIC ULTRASOUND OF UPPER GASTROINTESTINAL TRACT N/A 7/5/2019    Procedure: ULTRASOUND, UPPER GI TRACT, ENDOSCOPIC;  Surgeon: Kev Calderon MD;  Location: [facility] ENDO (2ND FLR);  Service: Endoscopy;  Laterality: N/A;  Pacemaker-Adam   appt confirmed-rb    MYELOGRAPHY N/A 5/4/2021    Procedure: Myelogram  CERVICAL, THORACIC AND LUMBAR;  Surgeon: [facility] Diagnostic Provider;  Location: [facility] 2ND FLR;  Service: Radiology;  Laterality: N/A;    NASAL SEPTOPLASTY N/A 12/19/2019    Procedure: SEPTOPLASTY, NOSE;  Surgeon: Martinez Flores III, MD;  Location: [facility];  Service: ENT;  Laterality: N/A;  FOLLOW DR SALVATORE KIMBROUGH PROTOCOL    OPEN REDUCTION AND INTERNAL FIXATION (ORIF) OF FRACTURE OF DISTAL RADIUS Left 1/24/2020    Procedure: ORIF, FRACTURE, RADIUS, DISTAL-left;  Surgeon: Ellen Moe MD;  Location: [facility] OR;  Service: Orthopedics;  Laterality: Left;    POSTERIOR FUSION OF CERVICAL SPINE WITH LAMINECTOMY N/A 11/14/2022    Procedure: LAMINECTOMY, SPINE, CERVICAL, WITH POSTERIOR FUSION C3-C6;  Surgeon: Nicolette Cheek MD;  Location: [facility] 2ND FLR;  Service: Neurosurgery;  Laterality: N/A;  TORONTO III, ASA III, BLOOD TYPE AND SCREEN, NEUROMONITORING EMG SEP MEP, BRACE/HALO [place], BED REGULAR BED, HEADREST [place], POSITION PRONE, SPECIAL EQUIPMENT NIKI MIDDLE[place], RADIOLOGY C-ARM, EXT. BONE STIMULATOR BIOMET    REPLACEMENT OF PACEMAKER GENERATOR Left 10/7/2022    Procedure: REPLACEMENT, PACEMAKER GENERATOR;  Surgeon: John Sheets MD;  Location: [facility] EP LAB;  Service: Cardiology;  Laterality: Left;  YAS,  SSS, AVB, Dual PPM Gen Change,SJM, MAC ,MS, 3 prep,** Adam dcPPM in situ**    SURGICAL REMOVAL OF NASAL TURBINATE Bilateral 2019    Procedure: EXCISION, NASAL TURBINATE;  Surgeon: Martinez Flores III, MD;  Location: [facility];  Service: ENT;  Laterality: Bilateral;    TONSILLECTOMY      WISDOM TOOTH EXTRACTION       Family History   Adopted: Yes   Problem Relation Age of Onset    Amblyopia Neg Hx     Blindness Neg Hx     Cataracts Neg Hx     Glaucoma Neg Hx     Macular degeneration Neg Hx     Retinal detachment Neg Hx      Social History     Tobacco Use    Smoking status: Former     Current packs/day: 0.00     Average packs/day: 0.3 packs/day for 15.0 years (3.8 ttl pk-yrs)     Types: Cigarettes     Start date: 1967     Quit date: 1982     Years since quittin.6    Smokeless tobacco: Former   Substance Use Topics    Alcohol use: Yes     Comment: no daily or heavy use, ~4 times per month    Drug use: No         Physical Exam     Initial Vitals [24]   BP Pulse Resp Temp SpO2   (!) 154/74 68 18 97.4 °F (36.3 °C) 99 %      MAP       --         Physical Exam    Nursing note and vitals reviewed.  Constitutional: She appears well-developed and well-nourished. She is not diaphoretic. No distress.   HENT:   Head: Normocephalic.   3 cm laceration over L eyebrow   Eyes: Conjunctivae and EOM are normal.   Neck: Neck supple.   Normal range of motion.  Cardiovascular:  Normal rate and regular rhythm.           Pulmonary/Chest: No respiratory distress.   Abdominal: She exhibits no distension.   Musculoskeletal:      Cervical back: Normal range of motion and neck supple.      Comments: R elbow with significant swelling to posterior elbow at the olecranon. She has full ROM and intact radial pulse     Neurological: She is alert and oriented to person, place, and time. GCS score is 15. GCS eye subscore is 4. GCS verbal subscore is 5. GCS motor subscore is 6.   Skin: Skin is warm and dry.   Psychiatric:  She has a normal mood and affect. Her behavior is normal. Judgment and thought content normal.         ED Course   Lac Repair    Date/Time: 2/23/2024 12:41 AM    Performed by: Olesya Ozuna MD  Authorized by: Olesya Ozuna MD    Consent:     Consent obtained:  Verbal    Consent given by:  Patient    Alternatives discussed:  No treatment  Universal protocol:     Patient identity confirmed:  Verbally with patient  Anesthesia:     Anesthesia method:  Local infiltration  Laceration details:     Location:  Face    Face location:  Forehead    Length (cm):  3  Pre-procedure details:     Preparation:  Patient was prepped and draped in usual sterile fashion and imaging obtained to evaluate for foreign bodies  Exploration:     Limited defect created (wound extended): no      Hemostasis achieved with:  Direct pressure    Imaging outcome: foreign body not noted      Wound exploration: wound explored through full range of motion      Contaminated: no    Treatment:     Area cleansed with:  Saline    Amount of cleaning:  Extensive    Irrigation solution:  Sterile saline    Irrigation method:  Pressure wash    Visualized foreign bodies/material removed: no    Skin repair:     Repair method:  Sutures and Steri-Strips    Suture size:  5-0    Suture material:  Fast-absorbing gut    Suture technique:  Simple interrupted    Number of sutures:  5  Splint Application    Date/Time: 2/23/2024 1:07 AM    Performed by: Olesya Ozuna MD  Authorized by: Olesya Ozuna MD  Consent Done: Yes  Consent given by: patient  Patient identity confirmed: verbally with patient  Location details: right arm  Splint type: long arm  Supplies used: Ortho-Glass  Post-procedure: The splinted body part was neurovascularly unchanged following the procedure.  Patient tolerance: Patient tolerated the procedure well with no immediate complications        Labs Reviewed   CBC W/ AUTO DIFFERENTIAL - Abnormal; Notable for the  following components:       Result Value    RBC 3.85 (*)     Hemoglobin 11.8 (*)     Hematocrit 35.0 (*)     All other components within normal limits   COMPREHENSIVE METABOLIC PANEL - Abnormal; Notable for the following components:    Total Bilirubin 1.3 (*)     Alkaline Phosphatase 48 (*)     eGFR 58.8 (*)     All other components within normal limits   COMPREHENSIVE METABOLIC PANEL - Abnormal; Notable for the following components:    CO2 22 (*)     Total Bilirubin 1.4 (*)     Alkaline Phosphatase 46 (*)     All other components within normal limits   CBC W/ AUTO DIFFERENTIAL - Abnormal; Notable for the following components:    RBC 3.49 (*)     Hemoglobin 10.7 (*)     Hematocrit 31.6 (*)     Platelets 109 (*)     Lymph % 16.7 (*)     All other components within normal limits   MAGNESIUM   TSH   HIV 1 / 2 ANTIBODY    Narrative:     Release to patient->Immediate   HEPATITIS C ANTIBODY    Narrative:     Release to patient->Immediate   MAGNESIUM   PHOSPHORUS   URINALYSIS, REFLEX TO URINE CULTURE          Imaging Results              CT Head Without Contrast (Final result)  Result time 02/23/24 00:11:41      Final result by Darin Sahni MD (02/23/24 00:11:41)                   Impression:      1. No acute intracranial process with no significant change from the recent prior study.  2. Stable scalp hematoma laterally on the left.  3. Stable minimal encephalomalacia of the inferior right frontal cortex may be related to remote trauma or small remote infarct.  4. Stable small remote lacunar infarct or perivascular space at the inferior margin of the right lateral basal ganglia.  5. Bilateral nasal fractures are age indeterminate.  Recommend clinical correlation.  6. See above comments also.      Electronically signed by: Darin Sahni  Date:    02/23/2024  Time:    00:11               Narrative:    EXAMINATION:  CT HEAD WITHOUT CONTRAST    CLINICAL HISTORY:  Head trauma, moderate-severe;    TECHNIQUE:  Low dose axial  CT images obtained throughout the head without intravenous contrast. Sagittal and coronal reconstructions were performed.    COMPARISON:  02/22/2024    FINDINGS:  There is no significant change from the study 4 hours prior.    Intracranial compartment:    Ventricles and sulci are normal in size for age without evidence of hydrocephalus. Scalp hematoma laterally on the left similar to the study.    Stable minimal encephalomalacia of the inferior right frontal cortex possibly related remote trauma.  A small remote lacunar infarct is not excluded.  Small remote lacunar infarct or perivascular space at the inferior margin the right lateral basal ganglia is unchanged.    Stable calcification adjacent to the posterior falx on the right, possibly a calcified meningioma or chronic dural calcification.  No significant change.    No parenchymal mass, hemorrhage, edema or major vascular distribution infarct.    Skull/extracranial contents (limited evaluation): No fracture. Mastoid air cells and paranasal sinuses are essentially clear.    Bilateral nasal fractures are age indeterminate.  Recommend clinical correlation.                                       CT Cervical Spine Without Contrast (Final result)  Result time 02/23/24 00:21:47      Final result by Darin Sahni MD (02/23/24 00:21:47)                   Impression:      1. No acute abnormality  2. Multilevel chronic degenerative and postoperative changes.  See above comments.      Electronically signed by: Darin Sahni  Date:    02/23/2024  Time:    00:21               Narrative:    EXAMINATION:  CT CERVICAL SPINE WITHOUT CONTRAST    CLINICAL HISTORY:  Neck trauma (Age >= 65y);    TECHNIQUE:  Low dose axial CT images through the cervical spine, with sagittal and coronal reformations.  Contrast was not administered.    COMPARISON:  None    FINDINGS:  No acute fractures of the cervical spine.  Posterior surgical fusion hardware from C3 through C6.    Severe disc space  narrowing at C5-6 with associated endplate degenerative changes.  Moderate disc space narrowing at C4-5, C2-3 and C6-7.    Mild grade 1 spondylolisthesis of C7 on T1.    Bilateral facet arthropathy from C2 through C6.    Laminectomy defect from C3 through C6.    No significant central canal narrowing.    Severe foraminal narrowing at C4-5 on the left, C5-6 on the left, C6-7 all on the left.    Moderate posterior disc osteophyte complex at C5-6 and to a slightly lesser degree C4-5 at C6-7.    Limited evaluation of the intraspinal contents demonstrates no hematoma or mass.Paraspinal soft tissues exhibit no acute abnormalities.                                        X-Ray Elbow Complete Right (Final result)  Result time 02/22/24 23:15:24      Final result by Darin Sahni MD (02/22/24 23:15:24)                   Impression:      Acute fracture of the proximal right ulna with displacement of the olecranon.  See above comments.  Recommend orthopedic consultation and follow-up.    This report was flagged in Epic as abnormal.      Electronically signed by: Darin Sahni  Date:    02/22/2024  Time:    23:15               Narrative:    EXAMINATION:  XR ELBOW COMPLETE 3 VIEW RIGHT    CLINICAL HISTORY:  . Pain in right elbow    TECHNIQUE:  AP, lateral, and oblique views of the right elbow were performed.    COMPARISON:  07/31/2020    FINDINGS:  There is acute fracture involving the proximal white all in a with mild displacement of the olecranon posteriorly and superiorly.  The distal humerus appears intact.  The proximal radius is intact.    Joint hemarthrosis is noted.  A soft tissue edema is noted.    Recommend orthopedic consultation and follow-up.                                       X-Ray Wrist Complete Right (Final result)  Result time 02/22/24 23:12:24      Final result by Darin Sahni MD (02/22/24 23:12:24)                   Impression:      No acute radiographic abnormality.    Remote fractures of the distal  radius and ulna.  See above comments.      Electronically signed by: Darin Carrasco  Date:    02/22/2024  Time:    23:12               Narrative:    EXAMINATION:  XR WRIST COMPLETE 3 VIEWS RIGHT    CLINICAL HISTORY:  Pain in right wrist    TECHNIQUE:  PA, lateral, and oblique views of the right wrist were performed.    COMPARISON:  11/01/2022    FINDINGS:  Remote fracture of the distal radial metaphysis and epiphysis with sclerotic change and mild callus formation.    The ulnar styloid process is flattened compared to the prior study.  This could be associated with a remote fracture in this region, new from the prior study.  No acute fractures are identified.    Carpal bones are intact.                                       Medications   buPROPion TB24 tablet 300 mg (300 mg Oral Given 2/23/24 0843)   gabapentin capsule 100 mg (100 mg Oral Not Given 2/23/24 0900)   losartan tablet 25 mg (25 mg Oral Given 2/23/24 0844)   pravastatin tablet 40 mg (40 mg Oral Given 2/23/24 0843)   propranoloL tablet 20 mg (20 mg Oral Given 2/23/24 0844)   sodium chloride 0.9% flush 10 mL (has no administration in time range)   albuterol-ipratropium 2.5 mg-0.5 mg/3 mL nebulizer solution 3 mL (has no administration in time range)   melatonin tablet 6 mg (has no administration in time range)   ondansetron disintegrating tablet 8 mg (has no administration in time range)   promethazine tablet 25 mg (has no administration in time range)   polyethylene glycol packet 17 g (has no administration in time range)   bisacodyL suppository 10 mg (has no administration in time range)   acetaminophen tablet 650 mg (has no administration in time range)   glucose chewable tablet 16 g (has no administration in time range)   glucose chewable tablet 24 g (has no administration in time range)   glucagon (human recombinant) injection 1 mg (has no administration in time range)   oxyCODONE immediate release tablet 5 mg (5 mg Oral Given 2/23/24 0425)   dextrose  10% bolus 125 mL 125 mL (has no administration in time range)   dextrose 10% bolus 250 mL 250 mL (has no administration in time range)   LIDOcaine (PF) 10 mg/ml (1%) injection 100 mg (100 mg Infiltration Given 2/22/24 2115)     Medical Decision Making  DDX: myelopathy, NPH, stroke, fracture, ICH    Pt had repair of L forehead lac after head CT was neg for intracranial injury  R elbow has a displaced R olecranon fracture  Ortho was consulted for her frequent falling which seems likely due to her myelopathy  She has clonus and a pos babinski  CT head was neg for acute ICH  CT cervical spine without acute fractures    Amount and/or Complexity of Data Reviewed  Labs: ordered.  Radiology: ordered.    Risk  Prescription drug management.                                      Clinical Impression:  Final diagnoses:  [R29.6] Recurrent falls  [M25.521] Right elbow pain  [M25.531] Wrist pain, right  [S52.021A] Closed fracture of olecranon process of right ulna, initial encounter (Primary)  [S01.81XA] Laceration of forehead, initial encounter  [S01.01XA] Laceration of scalp, initial encounter  [R07.9] Chest pain          ED Disposition Condition    Observation                 Olesya Ozuna MD  02/23/24 [id number]

## 2024-02-24 LAB
ALBUMIN SERPL BCP-MCNC: 3.4 G/DL (ref 3.5–5.2)
ALP SERPL-CCNC: 43 U/L (ref 55–135)
ALT SERPL W/O P-5'-P-CCNC: 16 U/L (ref 10–44)
ANION GAP SERPL CALC-SCNC: 8 MMOL/L (ref 8–16)
AST SERPL-CCNC: 15 U/L (ref 10–40)
BASOPHILS # BLD AUTO: 0.04 K/UL (ref 0–0.2)
BASOPHILS NFR BLD: 0.5 % (ref 0–1.9)
BILIRUB SERPL-MCNC: 1.1 MG/DL (ref 0.1–1)
BUN SERPL-MCNC: 18 MG/DL (ref 8–23)
CALCIUM SERPL-MCNC: 8.9 MG/DL (ref 8.7–10.5)
CHLORIDE SERPL-SCNC: 110 MMOL/L (ref 95–110)
CO2 SERPL-SCNC: 20 MMOL/L (ref 23–29)
CREAT SERPL-MCNC: 1 MG/DL (ref 0.5–1.4)
DIFFERENTIAL METHOD BLD: ABNORMAL
EOSINOPHIL # BLD AUTO: 0.3 K/UL (ref 0–0.5)
EOSINOPHIL NFR BLD: 3.4 % (ref 0–8)
ERYTHROCYTE [DISTWIDTH] IN BLOOD BY AUTOMATED COUNT: 13.3 % (ref 11.5–14.5)
EST. GFR  (NO RACE VARIABLE): 58.8 ML/MIN/1.73 M^2
GLUCOSE SERPL-MCNC: 108 MG/DL (ref 70–110)
HCT VFR BLD AUTO: 29.8 % (ref 37–48.5)
HGB BLD-MCNC: 9.9 G/DL (ref 12–16)
IMM GRANULOCYTES # BLD AUTO: 0.02 K/UL (ref 0–0.04)
IMM GRANULOCYTES NFR BLD AUTO: 0.3 % (ref 0–0.5)
LYMPHOCYTES # BLD AUTO: 2.1 K/UL (ref 1–4.8)
LYMPHOCYTES NFR BLD: 27.5 % (ref 18–48)
MAGNESIUM SERPL-MCNC: 2.1 MG/DL (ref 1.6–2.6)
MCH RBC QN AUTO: 30.2 PG (ref 27–31)
MCHC RBC AUTO-ENTMCNC: 33.2 G/DL (ref 32–36)
MCV RBC AUTO: 91 FL (ref 82–98)
MONOCYTES # BLD AUTO: 1 K/UL (ref 0.3–1)
MONOCYTES NFR BLD: 13.2 % (ref 4–15)
NEUTROPHILS # BLD AUTO: 4.2 K/UL (ref 1.8–7.7)
NEUTROPHILS NFR BLD: 55.1 % (ref 38–73)
NRBC BLD-RTO: 0 /100 WBC
PHOSPHATE SERPL-MCNC: 3.7 MG/DL (ref 2.7–4.5)
PLATELET # BLD AUTO: 146 K/UL (ref 150–450)
PMV BLD AUTO: 11.1 FL (ref 9.2–12.9)
POTASSIUM SERPL-SCNC: 4 MMOL/L (ref 3.5–5.1)
PROT SERPL-MCNC: 6 G/DL (ref 6–8.4)
RBC # BLD AUTO: 3.28 M/UL (ref 4–5.4)
SODIUM SERPL-SCNC: 138 MMOL/L (ref 136–145)
WBC # BLD AUTO: 7.65 K/UL (ref 3.9–12.7)

## 2024-02-24 PROCEDURE — 83735 ASSAY OF MAGNESIUM: CPT | Mod: HCNC

## 2024-02-24 PROCEDURE — 36415 COLL VENOUS BLD VENIPUNCTURE: CPT | Mod: HCNC

## 2024-02-24 PROCEDURE — 97530 THERAPEUTIC ACTIVITIES: CPT | Mod: HCNC

## 2024-02-24 PROCEDURE — 97116 GAIT TRAINING THERAPY: CPT | Mod: HCNC

## 2024-02-24 PROCEDURE — 84100 ASSAY OF PHOSPHORUS: CPT | Mod: HCNC

## 2024-02-24 PROCEDURE — 25000003 PHARM REV CODE 250: Mod: HCNC

## 2024-02-24 PROCEDURE — 97110 THERAPEUTIC EXERCISES: CPT | Mod: HCNC

## 2024-02-24 PROCEDURE — 21400001 HC TELEMETRY ROOM: Mod: HCNC

## 2024-02-24 PROCEDURE — 85025 COMPLETE CBC W/AUTO DIFF WBC: CPT | Mod: HCNC

## 2024-02-24 PROCEDURE — 80053 COMPREHEN METABOLIC PANEL: CPT | Mod: HCNC

## 2024-02-24 PROCEDURE — 97535 SELF CARE MNGMENT TRAINING: CPT | Mod: HCNC

## 2024-02-24 RX ADMIN — ACETAMINOPHEN 1000 MG: 500 TABLET ORAL at 08:02

## 2024-02-24 RX ADMIN — PRAVASTATIN SODIUM 40 MG: 40 TABLET ORAL at 08:02

## 2024-02-24 RX ADMIN — GABAPENTIN 100 MG: 100 CAPSULE ORAL at 08:02

## 2024-02-24 RX ADMIN — FLUOXETINE HYDROCHLORIDE 40 MG: 20 CAPSULE ORAL at 08:02

## 2024-02-24 RX ADMIN — ACETAMINOPHEN 1000 MG: 500 TABLET ORAL at 03:02

## 2024-02-24 RX ADMIN — LOSARTAN POTASSIUM 25 MG: 25 TABLET, FILM COATED ORAL at 08:02

## 2024-02-24 RX ADMIN — PROPRANOLOL HYDROCHLORIDE 20 MG: 10 TABLET ORAL at 08:02

## 2024-02-24 RX ADMIN — METHOCARBAMOL 500 MG: 500 TABLET ORAL at 01:02

## 2024-02-24 RX ADMIN — METHOCARBAMOL 500 MG: 500 TABLET ORAL at 08:02

## 2024-02-24 RX ADMIN — METHOCARBAMOL 500 MG: 500 TABLET ORAL at 07:02

## 2024-02-24 RX ADMIN — BUPROPION HYDROCHLORIDE 300 MG: 300 TABLET, FILM COATED, EXTENDED RELEASE ORAL at 08:02

## 2024-02-24 NOTE — NURSING
Nurses Note -- 4 Eyes      2/23/2024   6:16 PM      Skin assessed during: Admit      [x] No Altered Skin Integrity Present    [x]Prevention Measures Documented      [] Yes- Altered Skin Integrity Present or Discovered   [] LDA Added if Not in Epic (Describe Wound)   [] New Altered Skin Integrity was Present on Admit and Documented in LDA   [] Wound Image Taken    Wound Care Consulted? No    Attending Nurse:  Louise Wei RN/Staff Member:   Tamara

## 2024-02-24 NOTE — HOSPITAL COURSE
Manisha Wynne was admitted to hospital medicine for evaluation of right elbow fracture secondary to fall. Seen by inpatient ortho, arm splinted and outpatient follow up referral sent. PT/OT consulted, recommending low intensity therapy. Physical exam findings with ankle clonus and positive babinski - NSGY consulted and plan for outpatient CT myelogram for further workup. Patient requesting to be discharged home, does not wish to pursue any post acute placement and would only like home health services. States she has several neighbors that are willing to help her on discharge. Referral to outpatient neurology placed for further follow up of neuropathy and frequent falls.       On day of discharge patient's vital signs were stable and patient appeared clinically ready for discharge. Hospital course, discharge plan and return precautions discussed - patient expressed understanding. All questions answered at that time.

## 2024-02-24 NOTE — PLAN OF CARE
Problem: Physical Therapy  Goal: Physical Therapy Goal  Description: Goals to be met by: 3/23/24     Patient will increase functional independence with mobility by performin. Supine to sit with Garza. -In progress  2. Sit to stand transfer with Garza. -In progress  3. Bed to chair transfer with Modified Garza using LRAD. -In progress  4. Gait x 300 feet with Modified Garza using LRAD. -In progress   5. Ascend/descend 3 stairs with right Handrails Modified Garza using LRAD. -In progress   6. Lower extremity exercise program x15 reps per handout, with independence. -In progress    Outcome: Ongoing, Progressing

## 2024-02-24 NOTE — PLAN OF CARE
Problem: Adult Inpatient Plan of Care  Goal: Plan of Care Review  2/24/2024 0539 by Svetlana Roberts LPN  Outcome: Ongoing, Progressing  2/24/2024 0539 by Svetlana Roberts LPN  Outcome: Ongoing, Progressing  Goal: Optimal Comfort and Wellbeing  Outcome: Ongoing, Progressing     Problem: Infection  Goal: Absence of Infection Signs and Symptoms  2/24/2024 0539 by Svetlana Roberts LPN  Outcome: Ongoing, Progressing  2/24/2024 0539 by Svetlana Roberts LPN  Outcome: Ongoing, Progressing     Problem: Impaired Wound Healing  Goal: Optimal Wound Healing  2/24/2024 0539 by Svetlana Roberts LPN  Outcome: Ongoing, Progressing  2/24/2024 0539 by Svetlana Roberts LPN  Outcome: Ongoing, Progressing     Problem: Fall Injury Risk  Goal: Absence of Fall and Fall-Related Injury  2/24/2024 0539 by Svetlana Roberts LPN  Outcome: Ongoing, Progressing  2/24/2024 0539 by Svetlana Roberts LPN  Outcome: Ongoing, Progressing

## 2024-02-24 NOTE — ASSESSMENT & PLAN NOTE
Gait instability   Patient is s/p fall x 2 in the hospital today. - LOC and + Head trauma  - AF, VSS  - CT C spine showed no acute process  - CTH showed no acute intracranial process with no significant change. Stable scalp hematoma laterally on the left   - fall precautions  - neuro checks  - PT/OT rec low intensity therapy

## 2024-02-24 NOTE — ASSESSMENT & PLAN NOTE
- Xray R elbow showed an acute fracture of the proximal right ulna with displacement of the olecranon   - splinted in ED by Ortho; plan for outpatient follow up  - multimodal pain management   - PT/OT

## 2024-02-24 NOTE — PT/OT/SLP PROGRESS
Physical Therapy Co-Treatment    Patient Name:  Manisha Wynne   MRN:  [id number]    Co-treatment performed due to patient's multiple deficits requiring two skilled therapists to appropriately and safely assess patient's strength and endurance while facilitating functional tasks in addition to accommodating for patient's activity tolerance.      Recommendations:     Discharge Recommendations: Low Intensity Therapy  Discharge Equipment Recommendations: cane, straight  Barriers to discharge: Increased assistance needed at this time    Assessment:     Manisha Wynne is a 73 y.o. female admitted with a medical diagnosis of Fall.  She presents with the following impairments/functional limitations: weakness, impaired endurance, impaired self care skills, impaired functional mobility, gait instability, impaired balance, decreased upper extremity function, decreased lower extremity function, decreased safety awareness, pain, decreased ROM, impaired skin, impaired cardiopulmonary response to activity, orthopedic precautions. Today's treatment included strengthening, range of motion, balance training, gait training, and cardiovascular endurance interventions. Progressed exercises by requiring less assistance with supine to sitting EOB, indicating an increase in overall endurance and generalized strength. Patient requires skilled physical therapy services to address deficits and return patient to OF with no limitations.     Rehab Prognosis: Good; patient would benefit from acute skilled PT services to address these deficits and reach maximum level of function.    Recent Surgery: * No surgery found *      Plan:     During this hospitalization, patient to be seen 3 x/week to address the identified rehab impairments via gait training, therapeutic activities, therapeutic exercises, neuromuscular re-education and progress toward the following goals:    Plan of Care Expires:  03/23/24    Subjective     Chief  Complaint: She is doing good today with pain reports being 5/10 in her right arm but is agreeable to participate in physical therapy.    Patient/Family Comments/goals: To get stronger  Pain/Comfort:  Pain Rating 1: 5/10  Location - Side 1: Right  Location - Orientation 1: generalized  Location 1: arm  Pain Addressed 1: Reposition, Distraction, Nurse notified  Pain Rating Post-Intervention 1: 5/10      Objective:     Communicated with DORETHA Samuels prior to session. Patient found HOB elevated with PureWick, bed alarm (RUE sling) upon PT entry to room.     General Precautions: Standard, fall  Orthopedic Precautions: RUE non weight bearing  Braces:  (Right UE sling)  Respiratory Status: Room air     Functional Mobility:  Bed Mobility:  Rolling Left: contact guard assistance using bed rails (ensuring to keep orthopedic precautions)  Rolling Right: contact guard assistance using bed rails (ensuring to keep orthopedic precautions)  Scooting: stand by assistance (ensuring to keep orthopedic precautions)  Supine to Sit: contact guard assistance for trunk management (ensuring to keep orthopedic precautions)    Transfers:  Sit to Stand x multiple trials: contact guard assistance with no AD.    Gait:   Patient ambulated 15 + 15 feet with no AD/hand held assistance and contact guard assistance. She demonstrated occasional unsteady gait, decreased step length, decreased foot clearance, flexed posture, decreased aron, decreased arm swing, inconsistent bilateral foot placement, and short reciprocal lower extremity gait pattern .    Therapeutic Exercises:   Seated exercises: Patient performed with bilateral lower extremities.  LAQ: 2x10 reps  Calf raises: 2x10 reps  Toe raises: 2x10 reps  Standing exercises: Patient performed with bilateral lower extremities and contact guard assistance for balance.  Marches: 2x10 reps    Therapeutic Activities:   Patient performed activities of daily living with Lay, OT including upper and lower  body dressing and grooming at sink.  See bed mobility and transfers above.    Neuromuscular Exercises:  Balance:  Sitting: Performed sitting edge of bed and in bedside chair with left upper extremity as needed and supervision. No loss of balance noted.  Standing: Performed with no AD/hand held assistance and contact guard assistance. No loss of balance noted.     Patient Education:   Patient was provided with daily orientation and goals of this PT session.  Patient educated about importance of out of bed mobility and remaining up in chair most of the day.  Patient was educated to call nurse if she wanted to get back into bed and needed anything. She verbalized understanding.  Patient was educated on benefits of physical therapy on continued improvement in patient's quality of life.  Patient educated about pursed lip breathing technique and cued for use with mobility.  All questions answered to satisfaction, within scope of PT practice.   Patient voiced no other concerns.  RN notified of PT therapy session.     AM-PAC 6 CLICK MOBILITY  Turning over in bed (including adjusting bedclothes, sheets and blankets)?: 3  Sitting down on and standing up from a chair with arms (e.g., wheelchair, bedside commode, etc.): 3  Moving from lying on back to sitting on the side of the bed?: 3  Moving to and from a bed to a chair (including a wheelchair)?: 3  Need to walk in hospital room?: 3  Climbing 3-5 steps with a railing?: 3  Basic Mobility Total Score: 18     Patient left up I'm bedside chair with all lines intact, call button in reach, and RN notified.    GOALS:   Multidisciplinary Problems       Physical Therapy Goals          Problem: Physical Therapy    Goal Priority Disciplines Outcome Goal Variances Interventions   Physical Therapy Goal     PT, PT/OT Ongoing, Progressing     Description: Goals to be met by: 3/23/24     Patient will increase functional independence with mobility by performin. Supine to sit with  [place]. -In progress  2. Sit to stand transfer with [place]. -In progress  3. Bed to chair transfer with Modified [place] using LRAD. -In progress  4. Gait x 300 feet with Modified [place] using LRAD. -In progress   5. Ascend/descend 3 stairs with right Handrails Modified [place] using LRAD. -In progress   6. Lower extremity exercise program x15 reps per handout, with independence. -In progress                         Time Tracking:     PT Received On: 02/24/24  PT Start Time: 1100     PT Stop Time: 1124  PT Total Time (min): 24 min     Billable Minutes: Gait Training 12 and Therapeutic Exercise 12    Treatment Type: Treatment  PT/PTA: PT     Number of PTA visits since last PT visit: 0     02/24/2024

## 2024-02-24 NOTE — SUBJECTIVE & OBJECTIVE
Interval History:  NAEON. AFVSS.  Patient evaluated at bedside. Still experiencing pain to left elbow with movement, CT negative for fracture.     Review of Systems   Constitutional:  Negative for activity change, chills and fever.   HENT:  Negative for trouble swallowing.    Eyes:  Negative for photophobia and visual disturbance.   Respiratory:  Negative for cough, chest tightness and shortness of breath.    Cardiovascular:  Negative for chest pain, palpitations and leg swelling.   Gastrointestinal:  Negative for abdominal pain, constipation, diarrhea, nausea and vomiting.   Genitourinary:  Negative for dysuria, frequency and hematuria.   Musculoskeletal:  Positive for arthralgias, gait problem and myalgias. Negative for back pain and neck pain.   Skin:  Positive for color change (facial bruising) and wound. Negative for rash.   Neurological:  Negative for dizziness, syncope, speech difficulty and light-headedness.   Psychiatric/Behavioral:  Negative for agitation and confusion. The patient is not nervous/anxious.      Objective:     Vital Signs (Most Recent):  Temp: 97.9 °F (36.6 °C) (02/24/24 1650)  Pulse: 61 (02/24/24 1650)  Resp: 18 (02/24/24 1650)  BP: (!) 117/58 (02/24/24 1650)  SpO2: 100 % (02/24/24 1650) Vital Signs (24h Range):  Temp:  [96.6 °F (35.9 °C)-98 °F (36.7 °C)] 97.9 °F (36.6 °C)  Pulse:  [60-68] 61  Resp:  [18-20] 18  SpO2:  [96 %-100 %] 100 %  BP: (100-148)/(50-68) 117/58     Weight: 55 kg (121 lb 4.1 oz)  Body mass index is 18.44 kg/m².  No intake or output data in the 24 hours ending 02/24/24 1700      Physical Exam  Vitals and nursing note reviewed.   Constitutional:       Appearance: She is well-developed.   HENT:      Head: Normocephalic.      Comments: Lac repaired to forehead  Eyes:      Pupils: Pupils are equal, round, and reactive to light.      Comments: Ecchymosis noted to L eye   Cardiovascular:      Rate and Rhythm: Normal rate and regular rhythm.   Pulmonary:      Effort: Pulmonary  effort is normal.      Breath sounds: Normal breath sounds.   Abdominal:      Palpations: Abdomen is soft.      Tenderness: There is no abdominal tenderness.   Musculoskeletal:         General: Deformity present.      Left elbow: Deformity present. Tenderness present.      Right lower leg: No edema.      Left lower leg: No edema.      Comments: RUE splinted by Ortho  Able to wiggle fingers/ sensation intact   Skin:     General: Skin is warm and dry.   Neurological:      General: No focal deficit present.      Mental Status: She is alert and oriented to person, place, and time. Mental status is at baseline.      Motor: No weakness.   Psychiatric:         Behavior: Behavior normal.     Significant Labs: All pertinent labs within the past 24 hours have been reviewed.    Significant Imaging: I have reviewed all pertinent imaging results/findings within the past 24 hours.

## 2024-02-24 NOTE — PROGRESS NOTES
[facility] [address] [place] [address] Medicine  Progress Note    Patient Name: Manisha Wynne  MRN: [id number]  Patient Class: IP- Inpatient   Admission Date: 2/22/2024  Length of Stay: 1 days  Attending Physician: Kami Cheek MD  Primary Care Provider: Iris Blue MD        Subjective:     Principal Problem:Fall        HPI:  Manisha Wynne is a 73 y.o. female with a hx of HTN, Afib, CKD, recurrent falls and lumbar stenosis presents to the ED s/p fall x 2. Patient states she originally came to the hospital for a NSGY appt and had a mechanical fall when leaving. She was brought to the ED to be evaluated and was walking out to go home and fell again. States it was another mechanical fall where her foot possibly got caught on something and she tripped. Denies a syncopal event. Denies LOC. Endorses head trauma. Patient states she does have falls at home last one being in December. She uses walking sticks intermittently. Denies using a walker. Denies bowel/ bladder incontinence. Denies numbness/ tingling in hands and feet. Endorses a headache and pain to her L elbow. Otherwise denies fever, chills, chest pain, SOB, abdominal pain and urinary symptoms.     ED: AF, VSS. CBC/ CMP largely unremarkable. CTH showed no acute intracranial process with no significant change from the recent prior study. Stable scalp hematoma laterally on the left. Stable minimal encephalomalacia of the inferior right frontal cortex may be related to remote trauma or small remote infarct. Stable small remote lacunar infarct or perivascular space at the inferior margin of the right lateral basal ganglia. Xray R elbow showed acute fracture of the proximal right ulna with displacement of the olecranon. Ortho consulted. Admitted to .     Overview/Hospital Course:  Manisha Wynne was admitted to hospital medicine for evaluation of right elbow fracture secondary to fall. Seen by inpatient ortho, arm splinted and  outpatient follow up referral sent. PT/OT consulted, recommending low intensity therapy. Physical exam findings with ankle clonus and positive babinski - NSGY consulted and plan for outpatient CT myelogram for further workup.     Interval History:  NAEON. AFVSS.  Patient evaluated at bedside. Still experiencing pain to left elbow with movement, CT negative for fracture.     Review of Systems   Constitutional:  Negative for activity change, chills and fever.   HENT:  Negative for trouble swallowing.    Eyes:  Negative for photophobia and visual disturbance.   Respiratory:  Negative for cough, chest tightness and shortness of breath.    Cardiovascular:  Negative for chest pain, palpitations and leg swelling.   Gastrointestinal:  Negative for abdominal pain, constipation, diarrhea, nausea and vomiting.   Genitourinary:  Negative for dysuria, frequency and hematuria.   Musculoskeletal:  Positive for arthralgias, gait problem and myalgias. Negative for back pain and neck pain.   Skin:  Positive for color change (facial bruising) and wound. Negative for rash.   Neurological:  Negative for dizziness, syncope, speech difficulty and light-headedness.   Psychiatric/Behavioral:  Negative for agitation and confusion. The patient is not nervous/anxious.      Objective:     Vital Signs (Most Recent):  Temp: 97.9 °F (36.6 °C) (02/24/24 1650)  Pulse: 61 (02/24/24 1650)  Resp: 18 (02/24/24 1650)  BP: (!) 117/58 (02/24/24 1650)  SpO2: 100 % (02/24/24 1650) Vital Signs (24h Range):  Temp:  [96.6 °F (35.9 °C)-98 °F (36.7 °C)] 97.9 °F (36.6 °C)  Pulse:  [60-68] 61  Resp:  [18-20] 18  SpO2:  [96 %-100 %] 100 %  BP: (100-148)/(50-68) 117/58     Weight: 55 kg (121 lb 4.1 oz)  Body mass index is 18.44 kg/m².  No intake or output data in the 24 hours ending 02/24/24 1700      Physical Exam  Vitals and nursing note reviewed.   Constitutional:       Appearance: She is well-developed.   HENT:      Head: Normocephalic.      Comments: Lac  repaired to forehead  Eyes:      Pupils: Pupils are equal, round, and reactive to light.      Comments: Ecchymosis noted to L eye   Cardiovascular:      Rate and Rhythm: Normal rate and regular rhythm.   Pulmonary:      Effort: Pulmonary effort is normal.      Breath sounds: Normal breath sounds.   Abdominal:      Palpations: Abdomen is soft.      Tenderness: There is no abdominal tenderness.   Musculoskeletal:         General: Deformity present.      Left elbow: Deformity present. Tenderness present.      Right lower leg: No edema.      Left lower leg: No edema.      Comments: RUE splinted by Ortho  Able to wiggle fingers/ sensation intact   Skin:     General: Skin is warm and dry.   Neurological:      General: No focal deficit present.      Mental Status: She is alert and oriented to person, place, and time. Mental status is at baseline.      Motor: No weakness.   Psychiatric:         Behavior: Behavior normal.     Significant Labs: All pertinent labs within the past 24 hours have been reviewed.    Significant Imaging: I have reviewed all pertinent imaging results/findings within the past 24 hours.    Assessment/Plan:      * Fall  Gait instability   Patient is s/p fall x 2 in the hospital today. - LOC and + Head trauma  - AF, VSS  - CT C spine showed no acute process  - CTH showed no acute intracranial process with no significant change. Stable scalp hematoma laterally on the left   - fall precautions  - neuro checks  - PT/OT rec low intensity therapy     Closed fracture of olecranon process of right ulna  - Xray R elbow showed an acute fracture of the proximal right ulna with displacement of the olecranon   - splinted in ED by Ortho; plan for outpatient follow up  - multimodal pain management   - PT/OT    CKD (chronic kidney disease) stage 3, GFR 30-59 ml/min  Creatine stable for now. BMP reviewed- noted CrCl cannot be calculated (Unknown ideal weight.). according to latest data. Based on current GFR, CKD stage  is stage 3 - GFR 30-59.  Monitor UOP and serial BMP and adjust therapy as needed. Renally dose meds. Avoid nephrotoxic medications and procedures.  - Cr at baseline  - monitor daily CMP  - avoid nephrotoxic medications    Lumbar spinal stenosis  - follows with NSGY outpatient, consulted inpatient   -Pt remains neurologically stable at her baseline  -Imaging reviewed. CT cervical spine shows stable hardware  -Pt unable to get MRIs due to pacemaker. Discussed CT myelogram in her appointment with Dr. Cheek yesterday and initially deferred. Today, she reports she is willing to obtain outpatient myelogram at this point to better assess for spinal stenosis  -No acute Neurosurgical intervention warranted at this time    Cardiac pacemaker in situ  - hx noted      Dyslipidemia  - continue home statin      Atrial fibrillation  Patient with Paroxysmal (<7 days) atrial fibrillation which is controlled currently with Beta Blocker. Patient is currently in sinus rhythm.[id number] Score: 3.    - pt is not on chronic anticoagulation  - continue tele    Essential hypertension  Chronic, controlled. Latest blood pressure and vitals reviewed-     Temp:  [97.4 °F (36.3 °C)-98.1 °F (36.7 °C)]   Pulse:  [60-68]   Resp:  [16-18]   BP: (111-154)/(67-80)   SpO2:  [99 %-100 %] .   Home meds for hypertension were reviewed and noted below.   Hypertension Medications               losartan (COZAAR) 25 MG tablet Take 1 tablet (25 mg total) by mouth once daily.    propranoloL (INDERAL) 20 MG tablet Take 1 tablet (20 mg total) by mouth 2 (two) times daily.            While in the hospital, will manage blood pressure as follows; Continue home antihypertensive regimen    Will utilize p.r.n. blood pressure medication only if patient's blood pressure greater than 180/110 and she develops symptoms such as worsening chest pain or shortness of breath.      VTE Risk Mitigation (From admission, onward)           Ordered     IP VTE HIGH RISK PATIENT  Once          02/23/24 0156     Reason for No Pharmacological VTE Prophylaxis  Once        Question:  Reasons:  Answer:  Risk of Bleeding    02/23/24 0156     Place sequential compression device  Until discontinued         02/23/24 0156                    Discharge Planning   CARO: 2/24/2024     Code Status: Full Code   Is the patient medically ready for discharge?: No    Reason for patient still in hospital (select all that apply): Patient trending condition           Jennie Andrade PA-C  Department of Hospital Medicine   [facility] - [facility]

## 2024-02-24 NOTE — ASSESSMENT & PLAN NOTE
- follows with NSGY outpatient, consulted inpatient   -Pt remains neurologically stable at her baseline  -Imaging reviewed. CT cervical spine shows stable hardware  -Pt unable to get MRIs due to pacemaker. Discussed CT myelogram in her appointment with Dr. Cheek yesterday and initially deferred. Today, she reports she is willing to obtain outpatient myelogram at this point to better assess for spinal stenosis  -No acute Neurosurgical intervention warranted at this time   MVI/thiamine/folic acid infision, Atnibx, Levemir, HumaLog

## 2024-02-24 NOTE — PT/OT/SLP PROGRESS
"Occupational Therapy   Co-Treatment w/ PT    Name: Manisha Wynne  MRN: [id number]  Admitting Diagnosis:  Fall       Co-treat performed due to acuity and complexity of pt's medical status with 2 skilled disciplines needed to optimize pts occupational performance and  decreased activity tolerance.     Recommendations:     Discharge Recommendations: Low Intensity Therapy  Discharge Equipment Recommendations:  cane, straight  Barriers to discharge:   increased level of A required    Assessment:     Manisha Wynne is a 73 y.o. female with a medical diagnosis of Fall.  Session primarily focused on increased ADL performance including educating on compensatory strategies  for activity completion 2/2 to  dominate RUE w/ NWB precaution. She presents with deficits listed below. Performance deficits affecting function are impaired endurance, impaired self care skills, impaired functional mobility, decreased upper extremity function, decreased coordination, pain, orthopedic precautions.     Rehab Prognosis:  Good; patient would benefit from acute skilled OT services to address these deficits and reach maximum level of function.       Plan:     Patient to be seen 3 x/week to address the above listed problems via self-care/home management, therapeutic activities, therapeutic exercises  Plan of Care Expires: 03/23/24  Plan of Care Reviewed with: patient    Subjective     Chief Complaint: " My face is so bruised, I can't believe I fell"  Patient/Family Comments/goals: return home at [facility]  Pain/Comfort:  Pain Rating 1: 5/10  Location - Side 1: Right  Location - Orientation 1: generalized  Location 1: arm  Pain Addressed 1: Pre-medicate for activity, Reposition, Distraction  Pain Rating Post-Intervention 1: 5/10    Objective:     Communicated with: RN prior to session.  Patient found HOB elevated with PureWick, bed alarm (RUE sling) upon OT entry to room.    General Precautions: Standard, fall    Orthopedic " Precautions:RUE non weight bearing  Braces: UE Sling ((R))  Respiratory Status: Room air     Occupational Performance:     Bed Mobility:    Patient completed Supine to Sit with contact guard assistance     Functional Mobility/Transfers:  Patient completed Sit <> Stand Transfer with contact guard assistance  with  no assistive device   Patient completed Bed <> Chair Transfer using Step Transfer technique with contact guard assistance with no assistive device  Functional Mobility: Pt walked ~30' with CGA and no AD.     Activities of Daily Living:  Grooming: stand by assistance and contact guard assistance while standing at the sink for oral and facial hygiene.   Upper Body Dressing: moderate assistance with Readjusting UE sling and [facility] gown to simulate jacket.   Lower Body Dressing: maximal assistance to angie non slip socks.       [facility] 6 Click ADL: 18    Treatment & Education:  Role of OT and OT POC  Educated on OOB activity and impact on increasing functional independence.  Educated on importance of progressive mobilization to increase strength and endurance.      Patient left up in chair with all lines intact, call button in reach, and RN notified    GOALS:   Multidisciplinary Problems       Occupational Therapy Goals          Problem: Occupational Therapy    Goal Priority Disciplines Outcome Interventions   Occupational Therapy Goal     OT, PT/OT Ongoing, Progressing    Description: Goals to be met by: 3/23/24     Patient will increase functional independence with ADLs by performing:    UE Dressing with [place].  LE Dressing with [place].  Grooming while standing at sink with Modified [place].  Toileting from toilet with Modified [place] for hygiene and clothing management.   Supine to sit with [place].  Step transfer with Modified [place]  Toilet transfer to toilet with Modified [place].                         Time Tracking:     OT Date of Treatment: 02/24/24  OT  Start Time: 1100  OT Stop Time: 1124  OT Total Time (min): 24 min    Billable Minutes:Self Care/Home Management 14  Therapeutic Activity 10    OT/DESTINY: OT          2/24/2024

## 2024-02-25 VITALS
WEIGHT: 121.25 LBS | SYSTOLIC BLOOD PRESSURE: 152 MMHG | TEMPERATURE: 98 F | DIASTOLIC BLOOD PRESSURE: 67 MMHG | BODY MASS INDEX: 18.38 KG/M2 | RESPIRATION RATE: 18 BRPM | OXYGEN SATURATION: 100 % | HEART RATE: 60 BPM | HEIGHT: 68 IN

## 2024-02-25 PROCEDURE — 94761 N-INVAS EAR/PLS OXIMETRY MLT: CPT | Mod: HCNC

## 2024-02-25 PROCEDURE — 25000003 PHARM REV CODE 250: Mod: HCNC

## 2024-02-25 PROCEDURE — 99223 1ST HOSP IP/OBS HIGH 75: CPT | Mod: HCNC,,, | Performed by: ORTHOPAEDIC SURGERY

## 2024-02-25 RX ORDER — METHOCARBAMOL 500 MG/1
500 TABLET, FILM COATED ORAL 3 TIMES DAILY
Qty: 30 TABLET | Refills: 0 | Status: SHIPPED | OUTPATIENT
Start: 2024-02-25 | End: 2024-02-25

## 2024-02-25 RX ORDER — METHOCARBAMOL 500 MG/1
500 TABLET, FILM COATED ORAL 3 TIMES DAILY
Qty: 30 TABLET | Refills: 0 | Status: SHIPPED | OUTPATIENT
Start: 2024-02-25 | End: 2024-03-06

## 2024-02-25 RX ORDER — HYDROCODONE BITARTRATE AND ACETAMINOPHEN 5; 325 MG/1; MG/1
1 TABLET ORAL EVERY 6 HOURS PRN
Qty: 5 TABLET | Refills: 0 | Status: SHIPPED | OUTPATIENT
Start: 2024-02-25 | End: 2024-03-13

## 2024-02-25 RX ADMIN — BUPROPION HYDROCHLORIDE 300 MG: 300 TABLET, FILM COATED, EXTENDED RELEASE ORAL at 09:02

## 2024-02-25 RX ADMIN — METHOCARBAMOL 500 MG: 500 TABLET ORAL at 01:02

## 2024-02-25 RX ADMIN — FLUOXETINE HYDROCHLORIDE 40 MG: 20 CAPSULE ORAL at 09:02

## 2024-02-25 RX ADMIN — LOSARTAN POTASSIUM 25 MG: 25 TABLET, FILM COATED ORAL at 09:02

## 2024-02-25 RX ADMIN — GABAPENTIN 100 MG: 100 CAPSULE ORAL at 09:02

## 2024-02-25 RX ADMIN — PROPRANOLOL HYDROCHLORIDE 20 MG: 10 TABLET ORAL at 09:02

## 2024-02-25 RX ADMIN — ACETAMINOPHEN 1000 MG: 500 TABLET ORAL at 09:02

## 2024-02-25 RX ADMIN — PRAVASTATIN SODIUM 40 MG: 40 TABLET ORAL at 09:02

## 2024-02-25 RX ADMIN — METHOCARBAMOL 500 MG: 500 TABLET ORAL at 09:02

## 2024-02-25 NOTE — PLAN OF CARE
Problem: Adult Inpatient Plan of Care  Goal: Plan of Care Review  Outcome: Ongoing, Progressing     Problem: Infection  Goal: Absence of Infection Signs and Symptoms  Outcome: Ongoing, Progressing     Problem: Impaired Wound Healing  Goal: Optimal Wound Healing  Outcome: Ongoing, Progressing     Problem: Fall Injury Risk  Goal: Absence of Fall and Fall-Related Injury  Outcome: Ongoing, Progressing

## 2024-02-25 NOTE — PLAN OF CARE
[facility]      HOME HEALTH ORDERS  FACE TO FACE ENCOUNTER    Patient Name: Manisha Wynne  YOB: 1950    PCP: Iris Blue MD   [facility] [address] [facility] [address]  PCP Phone Number: [phone]  PCP Fax: [phone]    Encounter Date: 2/22/24    Admit to Home Health    Diagnoses:  Active Hospital Problems    Diagnosis  POA    *Fall [W19.XXXA]  Yes    Closed fracture of olecranon process of right ulna [S52.021A]  Yes    Cervical myelopathy [G95.9]  Yes    Gait instability [R26.81]  Yes    CKD (chronic kidney disease) stage 3, GFR 30-59 ml/min [N18.30]  Yes    Lumbar spinal stenosis [M48.061]  Yes    Cardiac pacemaker in situ [Z95.0]  Yes    Dyslipidemia [E78.5]  Yes    Essential hypertension [I10]  Yes    Atrial fibrillation [I48.91]  Yes     Suspected-- so far no documentation        Resolved Hospital Problems   No resolved problems to display.       Follow Up Appointments:  Future Appointments   Date Time Provider Department Center   2/29/2024  2:15 PM INJECTION [facility] INF [facility]   3/13/2024  9:30 AM Natali Comer MD [facility] CARDIO [place]   5/13/2024  9:00 AM [facility] MAMMO2 BX [facility]MO [facility]       Allergies:Review of patient's allergies indicates:  No Known Allergies    Medications: Review discharge medications with patient and family and provide education.    Current Facility-Administered Medications   Medication Dose Route Frequency Provider Last Rate Last Admin    acetaminophen tablet 1,000 mg  1,000 mg Oral TID Jennie Andrade PA-C   1,000 mg at 02/24/24 2051    albuterol-ipratropium 2.5 mg-0.5 mg/3 mL nebulizer solution 3 mL  3 mL Nebulization Q4H PRN Nancy Green PA-C        bisacodyL suppository 10 mg  10 mg Rectal Daily PRN Nancy Green PA-C        buPROPion 24 hr tablet 300 mg  300 mg Oral Daily Nancy Green PA-C   300 mg at 02/24/24 0837    dextrose 10% bolus 125 mL 125 mL  12.5 g  Intravenous PRN Nancy Green PA-C        dextrose 10% bolus 250 mL 250 mL  25 g Intravenous PRN Nancy Green PA-C        FLUoxetine capsule 40 mg  40 mg Oral Daily Jennie Andrade PA-C   40 mg at 02/24/24 0837    gabapentin capsule 100 mg  100 mg Oral BID Nancy Green PA-C   100 mg at 02/24/24 2051    glucagon (human recombinant) injection 1 mg  1 mg Intramuscular PRN Nancy Green PA-C        glucose chewable tablet 16 g  16 g Oral PRN Nancy Green PA-C        glucose chewable tablet 24 g  24 g Oral PRN Nancy Green PA-C        losartan tablet 25 mg  25 mg Oral Daily Nancy Green PA-C   25 mg at 02/24/24 0837    melatonin tablet 6 mg  6 mg Oral Nightly PRN Nancy Green PA-C        methocarbamoL tablet 500 mg  500 mg Oral QID Jennie Andrade PA-C   500 mg at 02/24/24 1908    ondansetron disintegrating tablet 8 mg  8 mg Oral Q8H PRN Nancy Green PA-C        oxyCODONE immediate release tablet 5 mg  5 mg Oral Q6H PRN Nancy Green PA-C   5 mg at 02/23/24 1639    polyethylene glycol packet 17 g  17 g Oral Daily PRN Nancy Green PA-C        pravastatin tablet 40 mg  40 mg Oral Daily Nancy Green PA-C   40 mg at 02/24/24 0837    promethazine tablet 25 mg  25 mg Oral Q6H PRN Nancy Green PA-C        propranoloL tablet 20 mg  20 mg Oral BID Nancy Green PA-C   20 mg at 02/24/24 2051    sodium chloride 0.9% flush 10 mL  10 mL Intravenous PRN Nancy Green PA-C         Current Discharge Medication List        START taking these medications    Details   methocarbamoL (ROBAXIN) 500 MG Tab Take 1 tablet (500 mg total) by mouth 3 (three) times daily. for 10 days  Qty: 30 tablet, Refills: 0           CONTINUE these medications which have NOT CHANGED    Details   acetaminophen (TYLENOL) 500 MG tablet Take 2 tablets (1,000 mg total) by mouth every 8 (eight) hours.  Refills: 0      buPROPion (WELLBUTRIN XL) 300 MG 24 hr  tablet Take 1 tablet by mouth once daily.      FLUoxetine 40 MG capsule Take 1 capsule (40 mg total) by mouth once daily.  Qty: 30 capsule, Refills: 0      gabapentin (NEURONTIN) 100 MG capsule One capsule 2 (two) times daily.  Qty: 180 capsule, Refills: 3      losartan (COZAAR) 25 MG tablet Take 1 tablet (25 mg total) by mouth once daily.  Qty: 90 tablet, Refills: 6    Comments: .      melatonin (MELATIN) 5 mg Take 1 tablet by mouth every evening. (For insomnia)      methyl salicylate-menthol 15-10% 15-10 % Crea Apply topically every evening.  Qty: 113 g, Refills: 0      pravastatin (PRAVACHOL) 40 MG tablet Take 1 tablet (40 mg total) by mouth once daily.  Qty: 90 tablet, Refills: 3      PROLIA 60 mg/mL Syrg Inject 60 mg into the skin every 6 (six) months.      propranoloL (INDERAL) 20 MG tablet Take 1 tablet (20 mg total) by mouth 2 (two) times daily.  Qty: 180 tablet, Refills: 3    Comments: .           STOP taking these medications       aspirin (ADULT LOW DOSE ASPIRIN) 81 MG EC tablet Comments:   Reason for Stopping:         BIOFREEZE, MENTHOL, TOP Comments:   Reason for Stopping:         calcium-vitamin D tablet 600 mg-200 units Comments:   Reason for Stopping:         cyanocobalamin (VITAMIN B-12) 1000 MCG tablet Comments:   Reason for Stopping:         lisdexamfetamine (VYVANSE) 70 MG capsule Comments:   Reason for Stopping:         naproxen sodium (ALEVE) 220 MG tablet Comments:   Reason for Stopping:         propylene glycol/peg 400/PF (SYSTANE, PF, OPHT) Comments:   Reason for Stopping:                 I have seen and examined this patient within the last 30 days. My clinical findings that support the need for the home health skilled services and home bound status are the following:no   Weakness/numbness causing balance and gait disturbance due to Fracture and Weakness/Debility making it taxing to leave home.     Diet:   regular diet    Labs:  Per agency     Referrals/ Consults  Physical Therapy to  evaluate and treat. Evaluate for home safety and equipment needs; Establish/upgrade home exercise program. Perform / instruct on therapeutic exercises, gait training, transfer training, and Range of Motion.  Occupational Therapy to evaluate and treat. Evaluate home environment for safety and equipment needs. Perform/Instruct on transfers, ADL training, ROM, and therapeutic exercises.   to evaluate for community resources/long-range planning.  Aide to provide assistance with personal care, ADLs, and vital signs.    Activities:   activity as tolerated    Non-weight bearing to right upper extremity until cleared by outpatient orthopedics    Nursing:   Agency to admit patient within 24 hours of hospital discharge unless specified on physician order or at patient request    SN to complete comprehensive assessment including routine vital signs. Instruct on disease process and s/s of complications to report to MD. Review/verify medication list sent home with the patient at time of discharge  and instruct patient/caregiver as needed. Frequency may be adjusted depending on start of care date.     Skilled nurse to perform up to 3 visits PRN for symptoms related to diagnosis    Notify MD if SBP > 160 or < 90; DBP > 90 or < 50; HR > 120 or < 50; Temp > 101; O2 < 88%; Oth    Ok to schedule additional visits based on staff availability and patient request on consecutive days within the home health episode.    When multiple disciplines ordered:    Start of Care occurs on Sunday - Wednesday schedule remaining discipline evaluations as ordered on separate consecutive days following the start of care.    Thursday SOC -schedule subsequent evaluations Friday and Monday the following week.     Friday - Saturday SOC - schedule subsequent discipline evaluations on consecutive days starting Monday of the following week.    For all post-discharge communication and subsequent orders please contact patient's primary care  physician. If unable to reach primary care physician or do not receive response within 30 minutes, please contact PCP for clinical staff order clarification      Home Health Aide:  Nursing Three times weekly, Physical Therapy Three times weekly, Occupational Therapy Three times weekly, Medical Social Work Three times weekly, and Home Health Aide Three times weekly    Wound Care Orders  no    I certify that this patient is confined to her home and needs intermittent skilled nursing care, physical therapy, and occupational therapy.      Jennie Andrade PA-C  [facility]   [facility]

## 2024-02-25 NOTE — PLAN OF CARE
CM arranged Lyft ride home for the patient, details below.       and number  Joaquina, [phone]  Make and Model  [employer]  License plate  [id number]  Request details  Tanika Tony  Date and time  2/25/24, 05:07 PM CST  Ride ID  [id number]      Tanika Tony RN  Weekend  - [facility]  Spectralink: [phone]

## 2024-02-26 ENCOUNTER — PATIENT MESSAGE (OUTPATIENT)
Dept: ORTHOPEDICS | Facility: CLINIC | Age: 74
End: 2024-02-26
Payer: MEDICARE

## 2024-02-26 ENCOUNTER — PATIENT OUTREACH (OUTPATIENT)
Dept: ADMINISTRATIVE | Facility: CLINIC | Age: 74
End: 2024-02-26
Payer: MEDICARE

## 2024-02-26 ENCOUNTER — TELEPHONE (OUTPATIENT)
Dept: ORTHOPEDICS | Facility: CLINIC | Age: 74
End: 2024-02-26
Payer: MEDICARE

## 2024-02-26 ENCOUNTER — PATIENT MESSAGE (OUTPATIENT)
Dept: INTERNAL MEDICINE | Facility: CLINIC | Age: 74
End: 2024-02-26
Payer: MEDICARE

## 2024-02-26 DIAGNOSIS — S42.401A CLOSED FRACTURE OF RIGHT ELBOW, INITIAL ENCOUNTER: Primary | ICD-10-CM

## 2024-02-26 NOTE — PROGRESS NOTES
C3 nurse attempted to contact Manisha Wynne  for a TCC post hospital discharge follow up call. No answer. Left voicemail with callback information. The patient does not have a scheduled HOSFU appointment. Message sent to PCP staff for assistance with scheduling visit with patient.

## 2024-02-26 NOTE — PLAN OF CARE
Update at 2:00 PM  [facility] accepted patient. Therapist will admit patient. Updated team.    Message received from care team regarding discharge. Patient is medically ready to discharge home, needs HH and ride home. [facility] website for  agencies within 10 miles of home address. Careport referrals sent to network agencies.      Tanika Tony RN  Weekend  - [facility]  [phone]

## 2024-02-26 NOTE — TELEPHONE ENCOUNTER
SARAM Requested a call back to the [facility] at [phone] for assistance scheduling an appointment with Dr. Ortega 02/27/24, late morning or early afternoon.     Zhane message sent

## 2024-02-26 NOTE — PLAN OF CARE
[facility]  Discharge Final Note    Primary Care Provider: Iris Blue MD    Expected Discharge Date: 2/25/2024    Final Discharge Note (most recent)       Final Note - 02/25/24 1700          Final Note    Assessment Type Final Discharge Note     Anticipated Discharge Disposition Home-Health Care [facility]     What phone number can be called within the next 1-3 days to see how you are doing after discharge? [phone]     Hospital Resources/Appts/Education Provided Appointments scheduled and added to AVS;Post-Acute resouces added to AVS;Provided patient/caregiver with written discharge plan information        Post-Acute Status    Post-Acute Authorization Home Health     Home Health Status Set-up Complete/Auth obtained     Discharge Delays None known at this time                   Important Message from Medicare         Contact Info       Iris Blue MD   Specialty: Internal Medicine   Relationship: PCP - General    [address]  [facility] [address]   Phone: [phone]       Next Steps: Follow up    [facility] - Orthopedics 5th Fl   Specialty: Orthopedics    [address]  [facility] [id number]   Phone: [phone]       Next Steps: Follow up    [facility] - Neurology 7th Fl   Specialty: Neurology    [address]  [facility] [address]   Phone: [phone]       Next Steps: Follow up    [facility]   Specialty: Home Health Services, Home Therapy Services, Home Living Aide Services    [address]  [facility] [address]   Phone: [phone]       Next Steps: Follow up          Tanika Tony RN  Weekend  - [facility]  [phone]

## 2024-02-27 ENCOUNTER — OFFICE VISIT (OUTPATIENT)
Dept: ORTHOPEDICS | Facility: CLINIC | Age: 74
End: 2024-02-27
Payer: MEDICARE

## 2024-02-27 VITALS — BODY MASS INDEX: 18.34 KG/M2 | HEIGHT: 68 IN | WEIGHT: 121 LBS

## 2024-02-27 DIAGNOSIS — S52.021A CLOSED FRACTURE OF OLECRANON PROCESS OF RIGHT ULNA, INITIAL ENCOUNTER: ICD-10-CM

## 2024-02-27 DIAGNOSIS — M25.531 RIGHT WRIST PAIN: ICD-10-CM

## 2024-02-27 DIAGNOSIS — S52.501A CLOSED FRACTURE OF DISTAL END OF RIGHT RADIUS, UNSPECIFIED FRACTURE MORPHOLOGY, INITIAL ENCOUNTER: Primary | ICD-10-CM

## 2024-02-27 PROCEDURE — 1111F DSCHRG MED/CURRENT MED MERGE: CPT | Mod: HCNC,CPTII,S$GLB, | Performed by: ORTHOPAEDIC SURGERY

## 2024-02-27 PROCEDURE — 1157F ADVNC CARE PLAN IN RCRD: CPT | Mod: HCNC,CPTII,S$GLB, | Performed by: ORTHOPAEDIC SURGERY

## 2024-02-27 PROCEDURE — 99999 PR PBB SHADOW E&M-EST. PATIENT-LVL III: CPT | Mod: PBBFAC,HCNC,, | Performed by: ORTHOPAEDIC SURGERY

## 2024-02-27 PROCEDURE — 99214 OFFICE O/P EST MOD 30 MIN: CPT | Mod: HCNC,57,S$GLB, | Performed by: ORTHOPAEDIC SURGERY

## 2024-02-27 PROCEDURE — 1125F AMNT PAIN NOTED PAIN PRSNT: CPT | Mod: HCNC,CPTII,S$GLB, | Performed by: ORTHOPAEDIC SURGERY

## 2024-02-27 RX ORDER — ACETAMINOPHEN 500 MG
1000 TABLET ORAL 2 TIMES DAILY PRN
Qty: 50 TABLET | Refills: 0 | Status: SHIPPED | OUTPATIENT
Start: 2024-02-27

## 2024-02-27 RX ORDER — LISDEXAMFETAMINE DIMESYLATE 70 MG/1
70 CAPSULE ORAL EVERY MORNING
COMMUNITY

## 2024-02-27 RX ORDER — ASPIRIN 81 MG/1
81 TABLET ORAL 2 TIMES DAILY
Qty: 60 TABLET | Refills: 0 | Status: SHIPPED | OUTPATIENT
Start: 2024-02-27 | End: 2024-03-29

## 2024-02-27 RX ORDER — IBUPROFEN 600 MG/1
600 TABLET ORAL 3 TIMES DAILY PRN
Qty: 45 TABLET | Refills: 0 | Status: SHIPPED | OUTPATIENT
Start: 2024-02-27

## 2024-02-27 RX ORDER — OXYCODONE AND ACETAMINOPHEN 5; 325 MG/1; MG/1
1 TABLET ORAL
Qty: 10 TABLET | Refills: 0 | Status: SHIPPED | OUTPATIENT
Start: 2024-02-27 | End: 2024-03-13

## 2024-02-27 NOTE — DISCHARGE SUMMARY
[facility] - Observation [address] [place] [address] Medicine  Discharge Summary      Patient Name: Manisha Wynne  MRN: [id number]  REMI: [id number]  Patient Class: IP- Inpatient  Admission Date: 2/22/2024  Hospital Length of Stay: 2 days  Discharge Date and Time: 2/25/2024  5:36 PM  Attending Physician: Kaila att. providers found   Discharging Provider: Jennie Andrade PA-C  Primary Care Provider: Iris Blue MD  [facility] Medicine Team: [facility] [place] [facility] HOSP MED  Jennie Andrade PA-C  Primary Care Team: [facility]    HPI:   Manisha Wynne is a 73 y.o. female with a hx of HTN, Afib, CKD, recurrent falls and lumbar stenosis presents to the ED s/p fall x 2. Patient states she originally came to the hospital for a NSGY appt and had a mechanical fall when leaving. She was brought to the ED to be evaluated and was walking out to go home and fell again. States it was another mechanical fall where her foot possibly got caught on something and she tripped. Denies a syncopal event. Denies LOC. Endorses head trauma. Patient states she does have falls at home last one being in December. She uses walking sticks intermittently. Denies using a walker. Denies bowel/ bladder incontinence. Denies numbness/ tingling in hands and feet. Endorses a headache and pain to her L elbow. Otherwise denies fever, chills, chest pain, SOB, abdominal pain and urinary symptoms.     ED: AF, VSS. CBC/ CMP largely unremarkable. CTH showed no acute intracranial process with no significant change from the recent prior study. Stable scalp hematoma laterally on the left. Stable minimal encephalomalacia of the inferior right frontal cortex may be related to remote trauma or small remote infarct. Stable small remote lacunar infarct or perivascular space at the inferior margin of the right lateral basal ganglia. Xray R elbow showed acute fracture of the proximal right ulna with displacement of the olecranon. Ortho consulted. Admitted to .     * No surgery  found *      Hospital Course:   Manisha Wynne was admitted to hospital medicine for evaluation of right elbow fracture secondary to fall. Seen by inpatient ortho, arm splinted and outpatient follow up referral sent. PT/OT consulted, recommending low intensity therapy. Physical exam findings with ankle clonus and positive babinski - NSGY consulted and plan for outpatient CT myelogram for further workup. Patient requesting to be discharged home, does not wish to pursue any post acute placement and would only like home health services. States she has several neighbors that are willing to help her on discharge. Referral to outpatient neurology placed for further follow up of neuropathy and frequent falls.       On day of discharge patient's vital signs were stable and patient appeared clinically ready for discharge. Hospital course, discharge plan and return precautions discussed - patient expressed understanding. All questions answered at that time.      Physical Exam  Vitals and nursing note reviewed.   Constitutional:       Appearance: She is well-developed.   HENT:      Head: Normocephalic.      Comments: Lac repaired to forehead  Eyes:      Pupils: Pupils are equal, round, and reactive to light.      Comments: Ecchymosis noted to L eye   Cardiovascular:      Rate and Rhythm: Normal rate and regular rhythm.   Pulmonary:      Effort: Pulmonary effort is normal.      Breath sounds: Normal breath sounds.   Abdominal:      Palpations: Abdomen is soft.      Tenderness: There is no abdominal tenderness.   Musculoskeletal:         General: Deformity present.      Left elbow: Deformity present. Tenderness present.      Right lower leg: No edema.      Left lower leg: No edema.      Comments: RUE splinted by Ortho  Able to wiggle fingers/ sensation intact   Skin:     General: Skin is warm and dry.   Neurological:      General: No focal deficit present.      Mental Status: She is alert and oriented to person, place,  and time. Mental status is at baseline.      Motor: No weakness.   Psychiatric:         Behavior: Behavior normal.     Goals of Care Treatment Preferences:  Code Status: Full Code    Living Will: Yes              Consults:   Consults (From admission, onward)          Status Ordering Provider     Inpatient consult to Neurosurgery  Once        Provider:  (Not yet assigned)    Completed NATALI ROGERS     Inpatient consult to Orthopedic Surgery  Once        Provider:  (Not yet assigned)    Completed OFELIA SWAN new Assessment & Plan notes have been filed under this hospital service since the last note was generated.  Service: Hospital Medicine    Final Active Diagnoses:    Diagnosis Date Noted POA    PRINCIPAL PROBLEM:  Fall [W19.XXXA] 02/23/2024 Yes    Closed fracture of olecranon process of right ulna [S52.021A] 02/23/2024 Yes    Cervical myelopathy [G95.9] 02/23/2024 Yes    Gait instability [R26.81] 02/14/2023 Yes    CKD (chronic kidney disease) stage 3, GFR 30-59 ml/min [N18.30] 01/17/2019 Yes    Lumbar spinal stenosis [M48.061] 03/31/2015 Yes    Cardiac pacemaker in situ [Z95.0] 11/09/2012 Yes    Dyslipidemia [E78.5]  Yes    Essential hypertension [I10] 07/24/2012 Yes    Atrial fibrillation [I48.91] 07/24/2012 Yes      Problems Resolved During this Admission:       Discharged Condition: fair    Disposition: Home-Health Care [facility]    Follow Up:   Follow-up Information       Iris Blue MD Follow up.    Specialty: Internal Medicine  Contact information:  [address]  [facility] [id number] [phone]               [facility] - Orthopedics 5th Fl Follow up.    Specialty: Orthopedics  Contact information:  [address], 5th Floor  [facility] [address] [phone]  Additional information:  [facility] - Main Building, 5th Floor   Please park in [facility] and take Atrium elevator             [facility] - Neurology 7th Fl Follow up.   "  Specialty: Neurology  Contact information:  [address]  [facility] [address] [phone]  Additional information:  [facility] [place] - Main Building, 7th Floor   Please park in [facility] and take Clinic elevator             [facility]    Specialties: Home Health Services, Home Therapy Services, Home Living Aide Services  Contact information:  [address]  [facility] [id number] [phone]                         Patient Instructions:      CANE FOR HOME USE     Order Specific Question Answer Comments   Type of Cane: Straight    Height: 5' 8" (1.727 m)    Weight: 55 kg (121 lb 4.1 oz)    Does patient have medical equipment at home? bath bench walking sticks / walking sticks / walking sticks   Does patient have medical equipment at home? walker, rolling    Does patient have medical equipment at home? raised toilet    Length of need (1-99 months): 99    Please check all that apply: Patient's condition impairs ambulation.    Please check all that apply: Patient is unable to safely ambulate without equipment.    Please check all that apply: Altered sensory perception.    Please check all that apply: Cane will be used for gait training.      Fl Myelogram 2 Or More Regions, COMPLETE   Standing Status: Future Standing Exp. Date: 02/23/25     Order Specific Question Answer Comments   May the Radiologist modify the order per protocol to meet the clinical needs of the patient? Yes    Release to patient Immediate      CT Thoracic Spine Without Contrast   Standing Status: Future Standing Exp. Date: 02/23/25   Order Comments: For myelogram     Order Specific Question Answer Comments   May the Radiologist modify the order per protocol to meet the clinical needs of the patient? Yes      CT Cervical Spine Without Contrast   Standing Status: Future Standing Exp. Date: 02/22/25   Order Comments: For myelogram     Order Specific Question Answer " Comments   Is the patient allergic to iodine or contrast? No    Is the patient on ANY Metformin medication such as Glucophage/Glucovance ? No    Does the patient have any of the following risk factors? Unable to assess      CT Lumbar Spine Without Contrast   Standing Status: Future Standing Exp. Date: 02/22/25   Order Comments: For myelogram     Order Specific Question Answer Comments   Is the patient allergic to iodine or contrast? No    Is the patient on ANY Metformin medication such as Glucophage/Glucovance ? No    Does the patient have any of the following risk factors? Unable to assess      Ambulatory referral/consult to Orthopedics   Standing Status: Future   Referral Priority: Urgent Referral Type: Consultation   Requested Specialty: Orthopedic Surgery   Number of Visits Requested: 1     Ambulatory referral/consult to Neurology   Standing Status: Future   Referral Priority: Urgent Referral Type: Consultation   Referral Reason: Specialty Services Required   Requested Specialty: Neurology   Number of Visits Requested: 1     Ambulatory referral/consult to Neurosurgery   Standing Status: Future   Referral Priority: Urgent Referral Type: Consultation   Referral Reason: Specialty Services Required   Requested Specialty: Neurosurgery   Number of Visits Requested: 1     Diet Adult Regular     Diet Adult Regular     Notify your health care provider if you experience any of the following:  temperature >100.4     Notify your health care provider if you experience any of the following:  persistent nausea and vomiting or diarrhea     Notify your health care provider if you experience any of the following:  severe uncontrolled pain     Notify your health care provider if you experience any of the following:  difficulty breathing or increased cough     Notify your health care provider if you experience any of the following:  severe persistent headache     Notify your health care provider if you experience any of the  following:  worsening rash     Notify your health care provider if you experience any of the following:  persistent dizziness, light-headedness, or visual disturbances     Notify your health care provider if you experience any of the following:  increased confusion or weakness     Notify your health care provider if you experience any of the following:  temperature >100.4     Notify your health care provider if you experience any of the following:  persistent nausea and vomiting or diarrhea     Notify your health care provider if you experience any of the following:  severe uncontrolled pain     Notify your health care provider if you experience any of the following:  difficulty breathing or increased cough     Notify your health care provider if you experience any of the following:  severe persistent headache     Notify your health care provider if you experience any of the following:  worsening rash     Notify your health care provider if you experience any of the following:  persistent dizziness, light-headedness, or visual disturbances     Notify your health care provider if you experience any of the following:  increased confusion or weakness     Activity as tolerated     Weight bearing restrictions (specify):   Order Comments: Non weight bearing to right upper extremity until cleared by outpatient orthopedics       Significant Diagnostic Studies:   Lab Results   Component Value Date    WBC 7.65 02/24/2024    HGB 9.9 (L) 02/24/2024    HCT 29.8 (L) 02/24/2024    MCV 91 02/24/2024     (L) 02/24/2024       BMP  Lab Results   Component Value Date     02/24/2024    K 4.0 02/24/2024     02/24/2024    CO2 20 (L) 02/24/2024    BUN 18 02/24/2024    CREATININE 1.0 02/24/2024    CALCIUM 8.9 02/24/2024    ANIONGAP 8 02/24/2024    EGFRNORACEVR 58.8 (A) 02/24/2024     Imaging Results              CT Arm Elbow Without Contrast Left (Final result)  Result time 02/24/24 14:23:02      Final result by  Selam Dowd MD (02/24/24 14:23:02)                   Impression:      No definite fracture identified.    Joint effusion.    If symptoms persist consider MRI evaluation.      Electronically signed by: Selam Dowd MD  Date:    02/24/2024  Time:    14:23               Narrative:    EXAMINATION:  CT ARM ELBOW WITHOUT CONTRAST LEFT    CLINICAL HISTORY:  Fracture, elbow;    TECHNIQUE:  0.625 mm axial images of the elbow joint obtained, coronal and sagittal images reformatted.    COMPARISON:  Radiograph 02/23/2024    FINDINGS:  There is a joint effusion.    No fracture of the distal humerus, proximal radius or proximal ulna identified.    No soft tissue air, no foreign body.  The musculature is intact.                                       X-Ray Elbow Complete Left (Final result)  Result time 02/23/24 14:34:53      Final result by Clayton Tafoya MD (02/23/24 14:34:53)                   Impression:      As above      Electronically signed by: Clayton Tafoya  Date:    02/23/2024  Time:    14:34               Narrative:    EXAMINATION:  XR ELBOW COMPLETE 3 VIEW LEFT    CLINICAL HISTORY:  left elbow pain after fall with distracting injury;    TECHNIQUE:  AP, lateral, and oblique views of the left elbow were performed.    COMPARISON:  None    FINDINGS:  No acute fractures, malalignment, bone lesions, or bone destructive process.  Joint effusion felt present with prominent anterior fat pad and small posterior fat pad.  Clinical correlation.  Opaque vascular cannula and IV tubing superimposes antecubital fossa and proximal forearm soft tissues.                                       CT Head Without Contrast (Final result)  Result time 02/23/24 00:11:41      Final result by Darin Sahni MD (02/23/24 00:11:41)                   Impression:      1. No acute intracranial process with no significant change from the recent prior study.  2. Stable scalp hematoma laterally on the left.  3. Stable minimal  encephalomalacia of the inferior right frontal cortex may be related to remote trauma or small remote infarct.  4. Stable small remote lacunar infarct or perivascular space at the inferior margin of the right lateral basal ganglia.  5. Bilateral nasal fractures are age indeterminate.  Recommend clinical correlation.  6. See above comments also.      Electronically signed by: Darin Sahni  Date:    02/23/2024  Time:    00:11               Narrative:    EXAMINATION:  CT HEAD WITHOUT CONTRAST    CLINICAL HISTORY:  Head trauma, moderate-severe;    TECHNIQUE:  Low dose axial CT images obtained throughout the head without intravenous contrast. Sagittal and coronal reconstructions were performed.    COMPARISON:  02/22/2024    FINDINGS:  There is no significant change from the study 4 hours prior.    Intracranial compartment:    Ventricles and sulci are normal in size for age without evidence of hydrocephalus. Scalp hematoma laterally on the left similar to the study.    Stable minimal encephalomalacia of the inferior right frontal cortex possibly related remote trauma.  A small remote lacunar infarct is not excluded.  Small remote lacunar infarct or perivascular space at the inferior margin the right lateral basal ganglia is unchanged.    Stable calcification adjacent to the posterior falx on the right, possibly a calcified meningioma or chronic dural calcification.  No significant change.    No parenchymal mass, hemorrhage, edema or major vascular distribution infarct.    Skull/extracranial contents (limited evaluation): No fracture. Mastoid air cells and paranasal sinuses are essentially clear.    Bilateral nasal fractures are age indeterminate.  Recommend clinical correlation.                                       CT Cervical Spine Without Contrast (Final result)  Result time 02/23/24 00:21:47      Final result by Darin Sahni MD (02/23/24 00:21:47)                   Impression:      1. No acute abnormality  2.  Multilevel chronic degenerative and postoperative changes.  See above comments.      Electronically signed by: Darin Sahni  Date:    02/23/2024  Time:    00:21               Narrative:    EXAMINATION:  CT CERVICAL SPINE WITHOUT CONTRAST    CLINICAL HISTORY:  Neck trauma (Age >= 65y);    TECHNIQUE:  Low dose axial CT images through the cervical spine, with sagittal and coronal reformations.  Contrast was not administered.    COMPARISON:  None    FINDINGS:  No acute fractures of the cervical spine.  Posterior surgical fusion hardware from C3 through C6.    Severe disc space narrowing at C5-6 with associated endplate degenerative changes.  Moderate disc space narrowing at C4-5, C2-3 and C6-7.    Mild grade 1 spondylolisthesis of C7 on T1.    Bilateral facet arthropathy from C2 through C6.    Laminectomy defect from C3 through C6.    No significant central canal narrowing.    Severe foraminal narrowing at C4-5 on the left, C5-6 on the left, C6-7 all on the left.    Moderate posterior disc osteophyte complex at C5-6 and to a slightly lesser degree C4-5 at C6-7.    Limited evaluation of the intraspinal contents demonstrates no hematoma or mass.Paraspinal soft tissues exhibit no acute abnormalities.                                        X-Ray Elbow Complete Right (Final result)  Result time 02/22/24 23:15:24      Final result by Darin Sahni MD (02/22/24 23:15:24)                   Impression:      Acute fracture of the proximal right ulna with displacement of the olecranon.  See above comments.  Recommend orthopedic consultation and follow-up.    This report was flagged in Epic as abnormal.      Electronically signed by: Darin Sahni  Date:    02/22/2024  Time:    23:15               Narrative:    EXAMINATION:  XR ELBOW COMPLETE 3 VIEW RIGHT    CLINICAL HISTORY:  . Pain in right elbow    TECHNIQUE:  AP, lateral, and oblique views of the right elbow were performed.    COMPARISON:  07/31/2020    FINDINGS:  There  is acute fracture involving the proximal white all in a with mild displacement of the olecranon posteriorly and superiorly.  The distal humerus appears intact.  The proximal radius is intact.    Joint hemarthrosis is noted.  A soft tissue edema is noted.    Recommend orthopedic consultation and follow-up.                                       X-Ray Wrist Complete Right (Final result)  Result time 02/22/24 23:12:24      Final result by Darin Sahni MD (02/22/24 23:12:24)                   Impression:      No acute radiographic abnormality.    Remote fractures of the distal radius and ulna.  See above comments.      Electronically signed by: Darin Sahni  Date:    02/22/2024  Time:    23:12               Narrative:    EXAMINATION:  XR WRIST COMPLETE 3 VIEWS RIGHT    CLINICAL HISTORY:  Pain in right wrist    TECHNIQUE:  PA, lateral, and oblique views of the right wrist were performed.    COMPARISON:  11/01/2022    FINDINGS:  Remote fracture of the distal radial metaphysis and epiphysis with sclerotic change and mild callus formation.    The ulnar styloid process is flattened compared to the prior study.  This could be associated with a remote fracture in this region, new from the prior study.  No acute fractures are identified.    Carpal bones are intact.                                        Pending Diagnostic Studies:       None           Medications:  Reconciled Home Medications:      Medication List        START taking these medications      HYDROcodone-acetaminophen 5-325 mg per tablet  Commonly known as: NORCO  Take 1 tablet by mouth every 6 (six) hours as needed for Pain.     methocarbamoL 500 MG Tab  Commonly known as: ROBAXIN  Take 1 tablet (500 mg total) by mouth 3 (three) times daily. for 10 days            CHANGE how you take these medications      gabapentin 100 MG capsule  Commonly known as: NEURONTIN  One capsule 2 (two) times daily.  What changed:   how much to take  how to take this  when to  take this            CONTINUE taking these medications      acetaminophen 500 MG tablet  Commonly known as: TYLENOL  Take 2 tablets (1,000 mg total) by mouth every 8 (eight) hours.     FLUoxetine 40 MG capsule  Take 1 capsule (40 mg total) by mouth once daily.     losartan 25 MG tablet  Commonly known as: COZAAR  Take 1 tablet (25 mg total) by mouth once daily.     melatonin 5 mg  Commonly known as: MELATIN  Take 1 tablet by mouth every evening. (For insomnia)     methyl salicylate-menthol 15-10% 15-10 % Crea  Apply topically every evening.     pravastatin 40 MG tablet  Commonly known as: PRAVACHOL  Take 1 tablet (40 mg total) by mouth once daily.     PROLIA 60 mg/mL Syrg  Generic drug: denosumab  Inject 60 mg into the skin every 6 (six) months.     propranoloL 20 MG tablet  Commonly known as: INDERAL  Take 1 tablet (20 mg total) by mouth 2 (two) times daily.     WELLBUTRIN  MG 24 hr tablet  Generic drug: buPROPion  Take 1 tablet by mouth once daily.            STOP taking these medications      ADULT LOW DOSE ASPIRIN 81 MG EC tablet  Generic drug: aspirin     ALEVE 220 MG tablet  Generic drug: naproxen sodium     BIOFREEZE (MENTHOL) TOP     calcium-vitamin D3 600 mg-5 mcg (200 unit) Tab  Commonly known as: CALCIUM 600 + D(3)     cyanocobalamin 1000 MCG tablet  Commonly known as: VITAMIN B-12     lisdexamfetamine 70 MG capsule  Commonly known as: VYVANSE     SYSTANE (PF) OPHT              Indwelling Lines/Drains at time of discharge:   Lines/Drains/Airways       None                   Time spent on the discharge of patient: 36 minutes         Jennie Andrade PA-C  Department of Hospital Medicine  [facility] - Observation 11H

## 2024-02-27 NOTE — PROGRESS NOTES
2nd attempt made to reach patient for [facility] call. Left voicemail please call [phone] leave first name, last name, and  and I will return your call.

## 2024-02-27 NOTE — PROGRESS NOTES
C3 nurse spoke with Manisha Wynne  for a [facility] post hospital discharge follow up call. The patient is in agreement with [facility] referral.  The patient confirmed her physical address and contact number.

## 2024-02-27 NOTE — PROGRESS NOTES
Patient status post fall x2 she has an olecranon fracture displaced on the right left normal skin is clean dry and intact with bruising and swelling plan we discussed surgical options due to the displacement of her olecranon risks benefits were explained in detail will consider for open reduction internal fixation she will be scheduled for the following day we also discussed possibility for plate removal in the future   Fall       Tripped and fell in [place], brought in by rapid response, hit L side of head , no loc and not on blood thinners denies neck pain      73 year old female presented to clinic with fall x2 right elbow pain  Per ED notes patient has with history of sick sinus syndrome, first-degree AV block with pacemaker, atrial fibrillation not on anticoagulation, hypertension, hyperlipidemia, neuropathy presents for a fall.  She was coming back from a neurosurgery appointment when she thinks that she may have caught her foot and fell, hitting the left side of her head.  She did not pass out or lose consciousness.  Now reporting headache and pain on that side of her face.  She denies neck pain, back pain, chest pain, abdominal pain, numbness/tingling or weakness.  Takes a baby aspirin, no other anticoagulants but does have history of thrombocytopenia.        Review of patient's allergies indicates:  No Known Allergies       Past Medical History:   Diagnosis Date    Abnormal Pap smear      Atrial fibrillation      AV block, 1st degree 07/25/2012    C. difficile diarrhea       RESOLVED    Cataract      Depression 07/24/2012    Facet arthritis of lumbar region 03/31/2015    Falls       3 falls in the last 6 mos--noted 6/19/19    Hyperlipidemia      Hypertension 07/24/2012    Neuropathy      Other specified cardiac dysrhythmias(427.89)      Sleep apnea      Syncope 07/24/2012    Tremors of nervous system       hands bilaterally            Past Surgical History:   Procedure Laterality Date    APPLICATION OF  CARTILAGE GRAFT Bilateral 12/19/2019     Procedure: APPLICATION, CARTILAGE GRAFT AURICULAR JEZ;  Surgeon: Martinez Flores III, MD;  Location: [facility];  Service: ENT;  Laterality: Bilateral;    CARDIAC PACEMAKER PLACEMENT   09/07/2012     [id number]    CATARACT EXTRACTION W/  INTRAOCULAR LENS IMPLANT Right 5/16/2022     Procedure: EXTRACTION, CATARACT, WITH IOL INSERTION;  Surgeon: Ines Aguilera MD;  Location: [facility] OR;  Service: Ophthalmology;  Laterality: Right;  Catalys     CATARACT EXTRACTION W/  INTRAOCULAR LENS IMPLANT Left 6/20/2022     Procedure: EXTRACTION, CATARACT, WITH IOL INSERTION;  Surgeon: Ines Aguilera MD;  Location: [facility] OR;  Service: Ophthalmology;  Laterality: Left;  Catalys     DILATION AND CURETTAGE OF UTERUS         Hx of precancerous cells?    ENDOSCOPIC ULTRASOUND OF UPPER GASTROINTESTINAL TRACT N/A 7/5/2019     Procedure: ULTRASOUND, UPPER GI TRACT, ENDOSCOPIC;  Surgeon: Kev Calderon MD;  Location: [facility] ENDO (2ND FLR);  Service: Endoscopy;  Laterality: N/A;  Pacemaker-Adam   appt confirmed-rb    MYELOGRAPHY N/A 5/4/2021     Procedure: Myelogram  CERVICAL, THORACIC AND LUMBAR;  Surgeon: [facility];  Location: [facility] OR 2ND FLR;  Service: Radiology;  Laterality: N/A;    NASAL SEPTOPLASTY N/A 12/19/2019     Procedure: SEPTOPLASTY, NOSE;  Surgeon: Martinez Flores III, MD;  Location: [facility];  Service: ENT;  Laterality: N/A;  FOLLOW DR SALVATORE KIMBROUGH PROTOCOL    OPEN REDUCTION AND INTERNAL FIXATION (ORIF) OF FRACTURE OF DISTAL RADIUS Left 1/24/2020     Procedure: ORIF, FRACTURE, RADIUS, DISTAL-left;  Surgeon: Ellen Moe MD;  Location: [facility] OR;  Service: Orthopedics;  Laterality: Left;    POSTERIOR FUSION OF CERVICAL SPINE WITH LAMINECTOMY N/A 11/14/2022     Procedure: LAMINECTOMY, SPINE, CERVICAL, WITH POSTERIOR FUSION C3-C6;  Surgeon: Nicolette Cheek MD;  Location: [facility] OR 2ND FLR;  Service: Neurosurgery;  Laterality: N/A;  TORONTO III, ASA III, BLOOD  TYPE AND SCREEN, NEUROMONITORING EMG SEP MEP, BRACE/HALO [place], BED REGULAR BED, HEADREST [place], POSITION PRONE, SPECIAL EQUIPMENT NIKI [place], RADIOLOGY C-ARM, EXT. BONE STIMULATOR BIOMET    REPLACEMENT OF PACEMAKER GENERATOR Left 10/7/2022     Procedure: REPLACEMENT, PACEMAKER GENERATOR;  Surgeon: John Sheets MD;  Location: [facility] EP LAB;  Service: Cardiology;  Laterality: Left;  YAS, SSS, AVB, Dual PPM Gen Change,SJM, MAC ,MT, 3 prep,** Adam dcPPM in situ**    SURGICAL REMOVAL OF NASAL TURBINATE Bilateral 2019     Procedure: EXCISION, NASAL TURBINATE;  Surgeon: Martinez Flores III, MD;  Location: [facility] OR;  Service: ENT;  Laterality: Bilateral;    TONSILLECTOMY        WISDOM TOOTH EXTRACTION                Family History   Adopted: Yes   Problem Relation Age of Onset    Amblyopia Neg Hx      Blindness Neg Hx      Cataracts Neg Hx      Glaucoma Neg Hx      Macular degeneration Neg Hx      Retinal detachment Neg Hx        Social History            Tobacco Use    Smoking status: Former       Current packs/day: 0.00       Average packs/day: 0.3 packs/day for 15.0 years (3.8 ttl pk-yrs)       Types: Cigarettes       Start date: 1967       Quit date: 1982       Years since quittin.6    Smokeless tobacco: Former   Substance Use Topics    Alcohol use: Yes       Comment: no daily or heavy use, ~4 times per month    Drug use: No      Review of Systems     Physical Exam             Initial Vitals   BP Pulse Resp Temp SpO2   24 1539 24 1535 24 1535 24 1539 24 1535   (!) 146/67 62 18 98.1 °F (36.7 °C) 100 %       MAP           --                          Physical Exam     Nursing note and vitals reviewed.  Constitutional: She appears well-developed and well-nourished.   HENT:   Head: Head is with contusion. Head is without raccoon's eyes and without Woods's sign.        Contusion on the left temporal head.  No tenderness to palpation of orbits, nose or face.    Eyes: EOM are normal. Pupils are equal, round, and reactive to light.   Neck: Neck supple.   Normal range of motion.  Cardiovascular:  Normal rate, regular rhythm, normal heart sounds and intact distal pulses.     Exam reveals no gallop and no friction rub.       No murmur heard.  Pulmonary/Chest: Breath sounds normal. No respiratory distress. She has no wheezes. She has no rhonchi. She has no rales. She exhibits no tenderness.   Musculoskeletal:         General: Normal range of motion.      Cervical back: Normal range of motion and neck supple.      Comments:  Swelling of right upper extremity with bruising was in a splint skin checked clean dry and intact neurovascularly intact      Neurological: She is alert and oriented to person, place, and time. She has normal strength. No cranial nerve deficit or sensory deficit. GCS score is 15. GCS eye subscore is 4. GCS verbal subscore is 5. GCS motor subscore is 6.   Skin: Skin is warm and dry.   Psychiatric: She has a normal mood and affect.            ED Course   Procedures  Labs Reviewed - No data to display        Imaging Results                  CT Head Without Contrast (Final result)  Result time 02/22/24 20:21:22            Final result by Freddy Troy MD (02/22/24 20:21:22)                           Impression:        Posterior left frontotemporal scalp soft tissue swelling/contusion without displaced skull fracture or acute intracranial abnormality identified.     Stable encephalomalacia in the inferior medial right frontal lobe, likely secondary to remote trauma.  No acute intracranial hemorrhage or recent infarct.     Electronically signed by resident: Sunitha Randle  Date:                                        02/22/2024  Time:                                                19:02     Electronically signed by:        Freddy Troy MD  Date:                                        02/22/2024  Time:                                                20:21                      Narrative:     EXAMINATION:  CT HEAD WITHOUT CONTRAST     CLINICAL HISTORY:  Head trauma, minor (Age >= 65y);Facial trauma, blunt;     TECHNIQUE:  Low dose axial CT images obtained throughout the head without the use of intravenous contrast.  Axial, sagittal, and coronal reconstructions were performed.     COMPARISON:  CT head 12/18/2023.  11/02/2023.     FINDINGS:  Intracranial compartment:     Ventricles and sulci are normal in size and age without evidence of hydrocephalus.     Unchanged focal hypodensity in the right basal ganglia, likely prominent perivascular space.  Stable volume loss in the inferomedial right frontal lobe, likely from remote trauma.  Lobe grossly stable mild periventricular white matter hypoattenuation likely sequela of chronic microvascular ischemic change.  No parenchymal mass, hemorrhage, edema, or major vascular distribution infarct.     No extra-axial blood or fluid collections.     Skull/extracranial contents (limited evaluation):     No fracture.  Soft tissue induration overlying the posterior left frontal and also left temporal calvarium.     Mastoid air cells and paranasal sinuses are essentially clear.                                               Medications   acetaminophen tablet 1,000 mg (1,000 mg Oral Given 2/22/24 1750)      Medical Decision Making  73-year-old female presenting for headache and bruising on her head after mechanical fall.  /67, vitals otherwise normal.  T patient has a displaced olecranon fracture risks and benefits were explained in detail consent signed in clinic patient will be scheduled for the following day

## 2024-02-28 ENCOUNTER — HOSPITAL ENCOUNTER (OUTPATIENT)
Facility: OTHER | Age: 74
Discharge: HOME | End: 2024-02-28
Attending: ORTHOPAEDIC SURGERY | Admitting: ORTHOPAEDIC SURGERY
Payer: MEDICARE

## 2024-02-28 ENCOUNTER — ANESTHESIA EVENT (OUTPATIENT)
Dept: SURGERY | Facility: OTHER | Age: 74
End: 2024-02-28
Payer: MEDICARE

## 2024-02-28 ENCOUNTER — ANESTHESIA (OUTPATIENT)
Dept: SURGERY | Facility: OTHER | Age: 74
End: 2024-02-28
Payer: MEDICARE

## 2024-02-28 DIAGNOSIS — S52.031A: Primary | ICD-10-CM

## 2024-02-28 PROCEDURE — C1769 GUIDE WIRE: HCPCS | Mod: HCNC | Performed by: ORTHOPAEDIC SURGERY

## 2024-02-28 PROCEDURE — 37000009 HC ANESTHESIA EA ADD 15 MINS: Mod: HCNC | Performed by: ORTHOPAEDIC SURGERY

## 2024-02-28 PROCEDURE — 63600175 PHARM REV CODE 636 W HCPCS: Mod: HCNC | Performed by: NURSE ANESTHETIST, CERTIFIED REGISTERED

## 2024-02-28 PROCEDURE — 63600175 PHARM REV CODE 636 W HCPCS: Mod: JZ,JG,HCNC | Performed by: ORTHOPAEDIC SURGERY

## 2024-02-28 PROCEDURE — 36000711: Mod: HCNC | Performed by: ORTHOPAEDIC SURGERY

## 2024-02-28 PROCEDURE — 25000003 PHARM REV CODE 250: Mod: HCNC

## 2024-02-28 PROCEDURE — 71000033 HC RECOVERY, INTIAL HOUR: Mod: HCNC | Performed by: ORTHOPAEDIC SURGERY

## 2024-02-28 PROCEDURE — 71000016 HC POSTOP RECOV ADDL HR: Mod: HCNC | Performed by: ORTHOPAEDIC SURGERY

## 2024-02-28 PROCEDURE — 36000710: Mod: HCNC | Performed by: ORTHOPAEDIC SURGERY

## 2024-02-28 PROCEDURE — D9220A PRA ANESTHESIA: Mod: HCNC,ANES,, | Performed by: ANESTHESIOLOGY

## 2024-02-28 PROCEDURE — 71000015 HC POSTOP RECOV 1ST HR: Mod: HCNC | Performed by: ORTHOPAEDIC SURGERY

## 2024-02-28 PROCEDURE — C1713 ANCHOR/SCREW BN/BN,TIS/BN: HCPCS | Mod: HCNC | Performed by: ORTHOPAEDIC SURGERY

## 2024-02-28 PROCEDURE — 25000003 PHARM REV CODE 250: Mod: HCNC | Performed by: NURSE ANESTHETIST, CERTIFIED REGISTERED

## 2024-02-28 PROCEDURE — D9220A PRA ANESTHESIA: Mod: HCNC,CRNA,, | Performed by: NURSE ANESTHETIST, CERTIFIED REGISTERED

## 2024-02-28 PROCEDURE — C1776 JOINT DEVICE (IMPLANTABLE): HCPCS | Mod: HCNC | Performed by: ORTHOPAEDIC SURGERY

## 2024-02-28 PROCEDURE — 24685 OPTX ULNAR FX PROX END W/FIX: CPT | Mod: RT,,, | Performed by: ORTHOPAEDIC SURGERY

## 2024-02-28 PROCEDURE — 63600175 PHARM REV CODE 636 W HCPCS: Mod: HCNC | Performed by: ANESTHESIOLOGY

## 2024-02-28 PROCEDURE — 27201423 OPTIME MED/SURG SUP & DEVICES STERILE SUPPLY: Mod: HCNC | Performed by: ORTHOPAEDIC SURGERY

## 2024-02-28 PROCEDURE — 71000039 HC RECOVERY, EACH ADD'L HOUR: Mod: HCNC | Performed by: ORTHOPAEDIC SURGERY

## 2024-02-28 PROCEDURE — 63600175 PHARM REV CODE 636 W HCPCS: Mod: HCNC

## 2024-02-28 PROCEDURE — 37000008 HC ANESTHESIA 1ST 15 MINUTES: Mod: HCNC | Performed by: ORTHOPAEDIC SURGERY

## 2024-02-28 DEVICE — GUIDE WIRE INSTRUMENT .062 X 6: Type: IMPLANTABLE DEVICE | Site: ELBOW | Status: FUNCTIONAL

## 2024-02-28 DEVICE — GUIDE PIN 2.0MM X 9 IN: Type: IMPLANTABLE DEVICE | Site: ELBOW | Status: FUNCTIONAL

## 2024-02-28 DEVICE — SCREW BNE N LOK HEXLB 3.5X14: Type: IMPLANTABLE DEVICE | Site: ELBOW | Status: FUNCTIONAL

## 2024-02-28 DEVICE — SCREW LOCK HEXALOBE 2.7 X 16MM: Type: IMPLANTABLE DEVICE | Site: ELBOW | Status: FUNCTIONAL

## 2024-02-28 DEVICE — SCREW BONE NLHEXALOBE 3.5 X 18: Type: IMPLANTABLE DEVICE | Site: ELBOW | Status: FUNCTIONAL

## 2024-02-28 DEVICE — SCREW BONE NL HEXALOBE 3.5 X 1: Type: IMPLANTABLE DEVICE | Site: ELBOW | Status: FUNCTIONAL

## 2024-02-28 DEVICE — SCREW BONE LOCK HEXALOBE 2.7 X: Type: IMPLANTABLE DEVICE | Site: ELBOW | Status: FUNCTIONAL

## 2024-02-28 RX ORDER — HYDROMORPHONE HYDROCHLORIDE 2 MG/ML
0.4 INJECTION, SOLUTION INTRAMUSCULAR; INTRAVENOUS; SUBCUTANEOUS EVERY 5 MIN PRN
Status: DISCONTINUED | OUTPATIENT
Start: 2024-02-28 | End: 2024-02-28 | Stop reason: HOSPADM

## 2024-02-28 RX ORDER — SODIUM CHLORIDE 9 MG/ML
INJECTION, SOLUTION INTRAVENOUS CONTINUOUS
Status: DISCONTINUED | OUTPATIENT
Start: 2024-02-28 | End: 2024-02-28 | Stop reason: HOSPADM

## 2024-02-28 RX ORDER — PROCHLORPERAZINE EDISYLATE 5 MG/ML
5 INJECTION INTRAMUSCULAR; INTRAVENOUS EVERY 30 MIN PRN
Status: DISCONTINUED | OUTPATIENT
Start: 2024-02-28 | End: 2024-02-28 | Stop reason: HOSPADM

## 2024-02-28 RX ORDER — SODIUM CHLORIDE 0.9 % (FLUSH) 0.9 %
3 SYRINGE (ML) INJECTION
Status: CANCELLED | OUTPATIENT
Start: 2024-02-28

## 2024-02-28 RX ORDER — SODIUM CHLORIDE 0.9 % (FLUSH) 0.9 %
3 SYRINGE (ML) INJECTION
Status: DISCONTINUED | OUTPATIENT
Start: 2024-02-28 | End: 2024-02-28 | Stop reason: HOSPADM

## 2024-02-28 RX ORDER — BUPIVACAINE HYDROCHLORIDE 2.5 MG/ML
INJECTION, SOLUTION EPIDURAL; INFILTRATION; INTRACAUDAL
Status: DISCONTINUED | OUTPATIENT
Start: 2024-02-28 | End: 2024-02-28 | Stop reason: HOSPADM

## 2024-02-28 RX ORDER — LIDOCAINE HYDROCHLORIDE 20 MG/ML
INJECTION INTRAVENOUS
Status: DISCONTINUED | OUTPATIENT
Start: 2024-02-28 | End: 2024-02-28

## 2024-02-28 RX ORDER — PROPOFOL 10 MG/ML
VIAL (ML) INTRAVENOUS
Status: DISCONTINUED | OUTPATIENT
Start: 2024-02-28 | End: 2024-02-28

## 2024-02-28 RX ORDER — MUPIROCIN 20 MG/G
OINTMENT TOPICAL
Status: DISCONTINUED | OUTPATIENT
Start: 2024-02-28 | End: 2024-02-28 | Stop reason: HOSPADM

## 2024-02-28 RX ORDER — ONDANSETRON HYDROCHLORIDE 2 MG/ML
INJECTION, SOLUTION INTRAVENOUS
Status: DISCONTINUED | OUTPATIENT
Start: 2024-02-28 | End: 2024-02-28

## 2024-02-28 RX ORDER — OXYCODONE HYDROCHLORIDE 5 MG/1
5 TABLET ORAL
Status: CANCELLED | OUTPATIENT
Start: 2024-02-28

## 2024-02-28 RX ORDER — HYDROMORPHONE HYDROCHLORIDE 2 MG/ML
0.4 INJECTION, SOLUTION INTRAMUSCULAR; INTRAVENOUS; SUBCUTANEOUS EVERY 5 MIN PRN
Status: CANCELLED | OUTPATIENT
Start: 2024-02-28

## 2024-02-28 RX ORDER — DEXAMETHASONE SODIUM PHOSPHATE 4 MG/ML
INJECTION, SOLUTION INTRA-ARTICULAR; INTRALESIONAL; INTRAMUSCULAR; INTRAVENOUS; SOFT TISSUE
Status: DISCONTINUED | OUTPATIENT
Start: 2024-02-28 | End: 2024-02-28

## 2024-02-28 RX ORDER — MEPERIDINE HYDROCHLORIDE 25 MG/ML
12.5 INJECTION INTRAMUSCULAR; INTRAVENOUS; SUBCUTANEOUS ONCE AS NEEDED
Status: CANCELLED | OUTPATIENT
Start: 2024-02-28 | End: 2024-02-29

## 2024-02-28 RX ORDER — DIPHENHYDRAMINE HYDROCHLORIDE 50 MG/ML
25 INJECTION INTRAMUSCULAR; INTRAVENOUS EVERY 6 HOURS PRN
Status: DISCONTINUED | OUTPATIENT
Start: 2024-02-28 | End: 2024-02-28 | Stop reason: HOSPADM

## 2024-02-28 RX ORDER — ROCURONIUM BROMIDE 10 MG/ML
INJECTION, SOLUTION INTRAVENOUS
Status: DISCONTINUED | OUTPATIENT
Start: 2024-02-28 | End: 2024-02-28

## 2024-02-28 RX ORDER — FENTANYL CITRATE 50 UG/ML
INJECTION, SOLUTION INTRAMUSCULAR; INTRAVENOUS
Status: DISCONTINUED | OUTPATIENT
Start: 2024-02-28 | End: 2024-02-28

## 2024-02-28 RX ORDER — OXYCODONE HYDROCHLORIDE 5 MG/1
5 TABLET ORAL
Status: DISCONTINUED | OUTPATIENT
Start: 2024-02-28 | End: 2024-02-28 | Stop reason: HOSPADM

## 2024-02-28 RX ORDER — MEPERIDINE HYDROCHLORIDE 25 MG/ML
12.5 INJECTION INTRAMUSCULAR; INTRAVENOUS; SUBCUTANEOUS ONCE AS NEEDED
Status: DISCONTINUED | OUTPATIENT
Start: 2024-02-28 | End: 2024-02-28 | Stop reason: HOSPADM

## 2024-02-28 RX ORDER — SODIUM CHLORIDE 0.9 % (FLUSH) 0.9 %
10 SYRINGE (ML) INJECTION
Status: DISCONTINUED | OUTPATIENT
Start: 2024-02-28 | End: 2024-02-28 | Stop reason: HOSPADM

## 2024-02-28 RX ADMIN — PROPOFOL 50 MG: 10 INJECTION, EMULSION INTRAVENOUS at 01:02

## 2024-02-28 RX ADMIN — ROCURONIUM BROMIDE 50 MG: 10 INJECTION INTRAVENOUS at 11:02

## 2024-02-28 RX ADMIN — SODIUM CHLORIDE, SODIUM LACTATE, POTASSIUM CHLORIDE, AND CALCIUM CHLORIDE: .6; .31; .03; .02 INJECTION, SOLUTION INTRAVENOUS at 09:02

## 2024-02-28 RX ADMIN — SUGAMMADEX 200 MG: 100 INJECTION, SOLUTION INTRAVENOUS at 02:02

## 2024-02-28 RX ADMIN — MUPIROCIN: 20 OINTMENT TOPICAL at 09:02

## 2024-02-28 RX ADMIN — LIDOCAINE HYDROCHLORIDE 100 MG: 20 INJECTION, SOLUTION INTRAVENOUS at 11:02

## 2024-02-28 RX ADMIN — CEFAZOLIN 2 G: 2 INJECTION, POWDER, FOR SOLUTION INTRAMUSCULAR; INTRAVENOUS at 11:02

## 2024-02-28 RX ADMIN — ONDANSETRON HYDROCHLORIDE 4 MG: 2 INJECTION INTRAMUSCULAR; INTRAVENOUS at 11:02

## 2024-02-28 RX ADMIN — PROPOFOL 120 MG: 10 INJECTION, EMULSION INTRAVENOUS at 11:02

## 2024-02-28 RX ADMIN — FENTANYL CITRATE 50 MCG: 0.05 INJECTION, SOLUTION INTRAMUSCULAR; INTRAVENOUS at 11:02

## 2024-02-28 RX ADMIN — ROCURONIUM BROMIDE 20 MG: 10 INJECTION INTRAVENOUS at 01:02

## 2024-02-28 RX ADMIN — DEXAMETHASONE SODIUM PHOSPHATE 8 MG: 4 INJECTION, SOLUTION INTRAMUSCULAR; INTRAVENOUS at 11:02

## 2024-02-28 RX ADMIN — SODIUM CHLORIDE, SODIUM LACTATE, POTASSIUM CHLORIDE, AND CALCIUM CHLORIDE: .6; .31; .03; .02 INJECTION, SOLUTION INTRAVENOUS at 01:02

## 2024-02-28 RX ADMIN — PROPOFOL 50 MG: 10 INJECTION, EMULSION INTRAVENOUS at 12:02

## 2024-02-28 RX ADMIN — ROCURONIUM BROMIDE 20 MG: 10 INJECTION INTRAVENOUS at 12:02

## 2024-02-28 RX ADMIN — FENTANYL CITRATE 50 MCG: 0.05 INJECTION, SOLUTION INTRAMUSCULAR; INTRAVENOUS at 12:02

## 2024-02-28 NOTE — DISCHARGE INSTRUCTIONS
[facility]  Ellen Moe M.D.      Post Operative Instructions for Dr. Moe:  You have been placed in a sterile post-operative dressing. Do not remove the dressing unless specifically instructed by Dr. Moe or her staff.  One or two weeks after surgery, depending on the type of surgery, you will have a post-operative visit where you will be seen by the surgeon's Physician Assistant who will remove the dressing.   If you have stitches or staples, they will also be removed during this post-operative visit. If you have a lot of swelling or stiffness, you will likely require therapy.   Please continue to move anything (fingers, wrists, elbows, shoulders) that is not immobilized in a splint, cast, or sling.   For example, if you had shoulder surgery and are in a sling, you should move your fingers, wrist and possibly elbow, as instructed by the doctor, resident or home health provider.   Please DO NOT attempt any strengthening exercises, including exercise balls or therapy putty, at this time as this could lead to more swelling and stiffness.  With the exception of shoulder surgery patients, please be sure to elevate your hand and arm above the level of your heart to help with swelling.   Additionally, it is recommended that you place cold packs inside plastic/Ziploc bags and place them on your arm, hand, and fingers. Please take care that no moisture gets into your dressing.   If your dressing gets wet, please call our office at [phone] and inform them that you are a post-operative patient who needs to be seen to have your dressing changed.   Please note that signs of infection are warmth, redness, swelling, increased pain, fever, chills, and nausea. It is normal to have some pain, swelling, and sometimes warmth following surgery, but if you are concerned, please contact our office.       Anesthesia: After Your Surgery  Youve just had surgery. During surgery, you received medication called  anesthesia to keep you comfortable and pain-free. After surgery, you may experience some pain or nausea. This is common. Here are some tips for feeling better and recovering after surgery.    Going home  Your doctor or nurse will show you how to take care of yourself when you go home. He or she will also answer your questions. Have an adult family member or friend drive you home. For the first 24 hours after your surgery:  Do not drive or use heavy equipment.  Do not make important decisions or sign legal documents.  Avoid alcohol.  Have someone stay with you, if needed. He or she can watch for problems and help keep you safe.  Take your time getting up from a seated or lying position. You may experience dizziness for 24 hours  Be sure to keep all follow-up appointments with your doctor. And rest after your procedure for as long as your doctor tells you to.    Coping with pain  If you have pain after surgery, pain medication will help you feel better. Take it as directed, before pain becomes severe. Also, ask your doctor or pharmacist about other ways to control pain, such as with heat, ice, and relaxation. And follow any other instructions your surgeon or nurse gives you.    URINARY RETENTION  Should you experience a decrease in your urine output or are unable to urinate following surgery, this can be due to the medications given during surgery.  We recommend you going to the nearest Emergency Department.    Tips for taking pain medication  To get the best relief possible, remember these points:  Pain medications can upset your stomach. Taking them with a little food may help.  Most pain relievers taken by mouth need at least 20 to 30 minutes to take effect.  Taking medication on a schedule can help you remember to take it. Try to time your medication so that you can take it before beginning an activity, such as dressing, walking, or sitting down for dinner.  Constipation is a common side effect of pain  medications. Contact your doctor before taking any medications like laxatives or stool softeners to help relieve constipation. Also ask about any dietary restrictions, because drinking lots of fluids and eating foods like fruits and vegetables that are high in fiber can also help. Remember, dont take laxatives unless your surgeon has prescribed them.  Mixing alcohol and pain medication can cause dizziness and slow your breathing. It can even be fatal. Dont drink alcohol while taking pain medication.  Pain medication can slow your reflexes. Dont drive or operate machinery while taking pain medication.  If your health care provider tells you to take acetaminophen to help relieve your pain, ask him or her how much you are supposed to take each day. (Acetaminophen is the generic name for Tylenol and other brand-name pain relievers.) Acetaminophen or other pain relievers may interact with your prescription medicines or other over-the-counter (OTC) drugs. Some prescription medications contain acetaminophen along with other active ingredients. Using both prescription and OTC acetaminophen for pain can cause you to overdose. The FDA recommends that you read the labels on your OTC medications carefully. This will help you to clearly understand the list of active ingredients, dosing instructions, and any warnings. It may also help you avoid taking too much acetaminophen. If you have questions or don't understand the information, ask your pharmacist or health care provider to explain it to you before you take the OTC medication.    Managing nausea  Some people have an upset stomach after surgery. This is often due to anesthesia, pain, pain medications, or the stress of surgery. The following tips will help you manage nausea and get good nutrition as you recover. If you were on a special diet before surgery, ask your doctor if you should follow it during recovery. These tips may help:  Dont push yourself to eat. Your body  will tell you when to eat and how much.  Start off with clear liquids and soup. They are easier to digest.  Progress to semi-solid foods (mashed potatoes, applesauce, and gelatin) as you feel ready.  Slowly move to solid foods. Dont eat fatty, rich, or spicy foods at first.  Dont force yourself to have three large meals a day. Instead, eat smaller amounts more often.  Take pain medications with a small amount of solid food, such as crackers or toast to avoid nausea.      Call your surgeon if    You feel too sleepy, dizzy, or groggy (medication may be too strong).  You have side effects like nausea, vomiting, or skin changes (rash, itching, or hives).   © 4317-0442 [employer] [address] [place] [address], [place] [address]. All rights reserved. This information is not intended as a substitute for professional medical care. Always follow your healthcare professional's instructions.

## 2024-02-28 NOTE — BRIEF OP NOTE
[facility] ([place])  Brief Operative Note    Surgery Date: 2/28/2024     Surgeon(s) and Role:     * Ellen Moe MD - Primary    Assisting Surgeon: None    Pre-op Diagnosis:  Closed fracture of distal end of right radius, unspecified fracture morphology, initial encounter [S52.501A]  Right wrist pain [M25.531]    Post-op Diagnosis:  Post-Op Diagnosis Codes:     * Closed fracture of distal end of right radius, unspecified fracture morphology, initial encounter [S52.501A]     * Right wrist pain [M25.531]    Procedure(s) (LRB):  RIGHT ELBOW ORIF (Right)    Anesthesia: General/Regional    Operative Findings: see op note    Estimated Blood Loss: * No values recorded between 2/28/2024 12:25 PM and 2/28/2024  2:27 PM *         Specimens:   Specimen (24h ago, onward)      None              Discharge Note    OUTCOME: Patient tolerated treatment/procedure well without complication and is now ready for discharge.    DISPOSITION: Home or Self Care    FINAL DIAGNOSIS:  Closed displaced fracture of olecranon process of right ulna with intra-articular extension    FOLLOWUP: In clinic    DISCHARGE INSTRUCTIONS:    Discharge Procedure Orders   Diet general     Call MD for:  temperature >100.4     Call MD for:  persistent nausea and vomiting     Call MD for:  severe uncontrolled pain     Call MD for:  difficulty breathing, headache or visual disturbances     Call MD for:  redness, tenderness, or signs of infection (pain, swelling, redness, odor or green/yellow discharge around incision site)     Call MD for:  hives     Call MD for:  persistent dizziness or light-headedness     Call MD for:  extreme fatigue     Leave dressing on - Keep it clean, dry, and intact until clinic visit     No driving, operating heavy equipment or signing legal documents while taking pain medication.     Lifting restrictions   Order Comments: Non weight bearing right arm

## 2024-02-28 NOTE — ANESTHESIA POSTPROCEDURE EVALUATION
Anesthesia Post Evaluation    Patient: Manisha Wynne    Procedure(s) Performed: Procedure(s) (LRB):  RIGHT ELBOW ORIF (Right)    Final Anesthesia Type: general      Patient location during evaluation: PACU  Patient participation: Yes- Able to Participate  Level of consciousness: awake and alert  Post-procedure vital signs: reviewed and stable  Pain management: adequate  Airway patency: patent  CEDRIC mitigation strategies: Extubation while patient is awake  PONV status at discharge: No PONV  Anesthetic complications: no      Cardiovascular status: hemodynamically stable  Respiratory status: unassisted  Hydration status: euvolemic  Follow-up not needed.              Vitals Value Taken Time   /70 02/28/24 1501   Temp 36.4 °C (97.5 °F) 02/28/24 1428   Pulse 60 02/28/24 1512   Resp 16 02/28/24 1500   SpO2 93 % 02/28/24 1512   Vitals shown include unvalidated device data.      No case tracking events are documented in the log.      Pain/Corie Score: Corie Score: 10 (2/28/2024  2:58 PM)

## 2024-02-28 NOTE — ANESTHESIA PROCEDURE NOTES
Intubation    Date/Time: 2/28/2024 11:51 AM    Performed by: Patricia Islas CRNA  Authorized by: Waldemar De La Rosa MD    Intubation:     Induction:  Intravenous    Intubated:  Postinduction    Mask Ventilation:  Easy mask    Attempts:  1    Attempted By:  CRNA    Method of Intubation:  Video laryngoscopy    Blade:  Babb 3    Laryngeal View Grade: Grade I - full view of cords      Difficult Airway Encountered?: No      Complications:  None    Airway Device:  Oral endotracheal tube    Airway Device Size:  7.0    Style/Cuff Inflation:  Cuffed (inflated to minimal occlusive pressure)    Tube secured:  21    Secured at:  The lips    Placement Verified By:  Capnometry    Complicating Factors:  None    Findings Post-Intubation:  BS equal bilateral and atraumatic/condition of teeth unchanged

## 2024-02-28 NOTE — H&P
Patient status post fall x2 she has an olecranon fracture displaced on the right left normal skin is clean dry and intact with bruising and swelling plan we discussed surgical options due to the displacement of her olecranon risks benefits were explained in detail will consider for open reduction internal fixation. Scheduled for 2/28/24. We also discussed possibility for plate removal in the future          Fall       Tripped and fell in [place], brought in by rapid response, hit L side of head , no loc and not on blood thinners denies neck pain      73 year old female presented to clinic with fall x2 right elbow pain  Per ED notes patient has with history of sick sinus syndrome, first-degree AV block with pacemaker, atrial fibrillation not on anticoagulation, hypertension, hyperlipidemia, neuropathy presents for a fall.  She was coming back from a neurosurgery appointment when she thinks that she may have caught her foot and fell, hitting the left side of her head.  She did not pass out or lose consciousness.  Now reporting headache and pain on that side of her face.  She denies neck pain, back pain, chest pain, abdominal pain, numbness/tingling or weakness.  Takes a baby aspirin, no other anticoagulants but does have history of thrombocytopenia.        Review of patient's allergies indicates:  No Known Allergies          Past Medical History:   Diagnosis Date    Abnormal Pap smear      Atrial fibrillation      AV block, 1st degree 07/25/2012    C. difficile diarrhea       RESOLVED    Cataract      Depression 07/24/2012    Facet arthritis of lumbar region 03/31/2015    Falls       3 falls in the last 6 mos--noted 6/19/19    Hyperlipidemia      Hypertension 07/24/2012    Neuropathy      Other specified cardiac dysrhythmias(427.89)      Sleep apnea      Syncope 07/24/2012    Tremors of nervous system       hands bilaterally                Past Surgical History:   Procedure Laterality Date    APPLICATION OF CARTILAGE  GRAFT Bilateral 12/19/2019     Procedure: APPLICATION, CARTILAGE GRAFT AURICULAR JEZ;  Surgeon: Martinez Flores III, MD;  Location: [facility];  Service: ENT;  Laterality: Bilateral;    CARDIAC PACEMAKER PLACEMENT   09/07/2012     [id number]    CATARACT EXTRACTION W/  INTRAOCULAR LENS IMPLANT Right 5/16/2022     Procedure: EXTRACTION, CATARACT, WITH IOL INSERTION;  Surgeon: Ines Aguilera MD;  Location: [facility] OR;  Service: Ophthalmology;  Laterality: Right;  Catalys     CATARACT EXTRACTION W/  INTRAOCULAR LENS IMPLANT Left 6/20/2022     Procedure: EXTRACTION, CATARACT, WITH IOL INSERTION;  Surgeon: Ines Aguilera MD;  Location: [facility] OR;  Service: Ophthalmology;  Laterality: Left;  Catalys     DILATION AND CURETTAGE OF UTERUS         Hx of precancerous cells?    ENDOSCOPIC ULTRASOUND OF UPPER GASTROINTESTINAL TRACT N/A 7/5/2019     Procedure: ULTRASOUND, UPPER GI TRACT, ENDOSCOPIC;  Surgeon: Kev Calderon MD;  Location: [facility] ENDO (2ND FLR);  Service: Endoscopy;  Laterality: N/A;  Pacemaker-Adam   appt confirmed-rb    MYELOGRAPHY N/A 5/4/2021     Procedure: Myelogram  CERVICAL, THORACIC AND LUMBAR;  Surgeon: [facility] Diagnostic Provider;  Location: [facility] OR 2ND FLR;  Service: Radiology;  Laterality: N/A;    NASAL SEPTOPLASTY N/A 12/19/2019     Procedure: SEPTOPLASTY, NOSE;  Surgeon: Martinez Flores III, MD;  Location: [facility];  Service: ENT;  Laterality: N/A;  FOLLOW DR SALVATORE KIMBROUGH PROTOCOL    OPEN REDUCTION AND INTERNAL FIXATION (ORIF) OF FRACTURE OF DISTAL RADIUS Left 1/24/2020     Procedure: ORIF, FRACTURE, RADIUS, DISTAL-left;  Surgeon: Ellen Moe MD;  Location: [facility];  Service: Orthopedics;  Laterality: Left;    POSTERIOR FUSION OF CERVICAL SPINE WITH LAMINECTOMY N/A 11/14/2022     Procedure: LAMINECTOMY, SPINE, CERVICAL, WITH POSTERIOR FUSION C3-C6;  Surgeon: Nicolette Cheek MD;  Location: [facility] OR 2ND FLR;  Service: Neurosurgery;  Laterality: N/A;  TORONTO III, ASA III, BLOOD TYPE AND  SCREEN, NEUROMONITORING EMG SEP MEP, BRACE/HALO Torres Martinez, BED REGULAR BED, HEADREST Clawson, POSITION PRONE, SPECIAL EQUIPMENT NIKI MIDDLETON, RADIOLOGY C-ARM, EXT. BONE STIMULATOR BIOMET    REPLACEMENT OF PACEMAKER GENERATOR Left 10/7/2022     Procedure: REPLACEMENT, PACEMAKER GENERATOR;  Surgeon: John Sheets MD;  Location: [facility] EP LAB;  Service: Cardiology;  Laterality: Left;  YAS, SSS, AVB, Dual PPM Gen Change,SJM, MAC ,NH, 3 prep,** Adam dcPPM in situ**    SURGICAL REMOVAL OF NASAL TURBINATE Bilateral 2019     Procedure: EXCISION, NASAL TURBINATE;  Surgeon: Martinez Flores III, MD;  Location: [facility] OR;  Service: ENT;  Laterality: Bilateral;    TONSILLECTOMY        WISDOM TOOTH EXTRACTION                    Family History   Adopted: Yes   Problem Relation Age of Onset    Amblyopia Neg Hx      Blindness Neg Hx      Cataracts Neg Hx      Glaucoma Neg Hx      Macular degeneration Neg Hx      Retinal detachment Neg Hx        Social History                Tobacco Use    Smoking status: Former       Current packs/day: 0.00       Average packs/day: 0.3 packs/day for 15.0 years (3.8 ttl pk-yrs)       Types: Cigarettes       Start date: 1967       Quit date: 1982       Years since quittin.6    Smokeless tobacco: Former   Substance Use Topics    Alcohol use: Yes       Comment: no daily or heavy use, ~4 times per month    Drug use: No      Review of Systems     Physical Exam                  Initial Vitals   BP Pulse Resp Temp SpO2   24 1539 24 1535 24 1535 24 1539 24 1535   (!) 146/67 62 18 98.1 °F (36.7 °C) 100 %       MAP           --                          Physical Exam     Nursing note and vitals reviewed.  Constitutional: She appears well-developed and well-nourished.   HENT:   Head: Head is with contusion. Head is without raccoon's eyes and without Woods's sign.        Contusion on the left temporal head.  No tenderness to palpation of orbits, nose or  face.   Eyes: EOM are normal. Pupils are equal, round, and reactive to light.   Neck: Neck supple.   Normal range of motion.  Cardiovascular:  Normal rate, regular rhythm, normal heart sounds and intact distal pulses.     Exam reveals no gallop and no friction rub.       No murmur heard.  Pulmonary/Chest: Breath sounds normal. No respiratory distress. She has no wheezes. She has no rhonchi. She has no rales. She exhibits no tenderness.   Musculoskeletal:         General: Normal range of motion.      Cervical back: Normal range of motion and neck supple.      Comments:  Swelling of right upper extremity with bruising was in a splint skin checked clean dry and intact neurovascularly intact      Neurological: She is alert and oriented to person, place, and time. She has normal strength. No cranial nerve deficit or sensory deficit. GCS score is 15. GCS eye subscore is 4. GCS verbal subscore is 5. GCS motor subscore is 6.   Skin: Skin is warm and dry.   Psychiatric: She has a normal mood and affect.            ED Course   Procedures  Labs Reviewed - No data to display        Imaging Results                  CT Head Without Contrast (Final result)  Result time 02/22/24 20:21:22                    Final result by Freddy Troy MD (02/22/24 20:21:22)                                    Impression:         Posterior left frontotemporal scalp soft tissue swelling/contusion without displaced skull fracture or acute intracranial abnormality identified.     Stable encephalomalacia in the inferior medial right frontal lobe, likely secondary to remote trauma.  No acute intracranial hemorrhage or recent infarct.     Electronically signed by resident: Sunitha Randle  Date:                                        02/22/2024  Time:                                                19:02     Electronically signed by:        Freddy Troy MD  Date:                                        02/22/2024  Time:                                                 20:21                             Narrative:      EXAMINATION:  CT HEAD WITHOUT CONTRAST     CLINICAL HISTORY:  Head trauma, minor (Age >= 65y);Facial trauma, blunt;     TECHNIQUE:  Low dose axial CT images obtained throughout the head without the use of intravenous contrast.  Axial, sagittal, and coronal reconstructions were performed.     COMPARISON:  CT head 12/18/2023.  11/02/2023.     FINDINGS:  Intracranial compartment:     Ventricles and sulci are normal in size and age without evidence of hydrocephalus.     Unchanged focal hypodensity in the right basal ganglia, likely prominent perivascular space.  Stable volume loss in the inferomedial right frontal lobe, likely from remote trauma.  Lobe grossly stable mild periventricular white matter hypoattenuation likely sequela of chronic microvascular ischemic change.  No parenchymal mass, hemorrhage, edema, or major vascular distribution infarct.     No extra-axial blood or fluid collections.     Skull/extracranial contents (limited evaluation):     No fracture.  Soft tissue induration overlying the posterior left frontal and also left temporal calvarium.     Mastoid air cells and paranasal sinuses are essentially clear.                                               Medications   acetaminophen tablet 1,000 mg (1,000 mg Oral Given 2/22/24 1750)      Medical Decision Making  73-year-old female presenting for headache and bruising on her head after mechanical fall.  /67, vitals otherwise normal.  T patient has a displaced olecranon fracture risks and benefits were explained in detail consent signed in clinic. To OR today

## 2024-02-28 NOTE — TRANSFER OF CARE
"Anesthesia Transfer of Care Note    Patient: Manisha Wynne    Procedure(s) Performed: Procedure(s) (LRB):  RIGHT ELBOW ORIF (Right)    Patient location: PACU    Anesthesia Type: general    Transport from OR: Transported from OR on 2-3 L/min O2 by NC with adequate spontaneous ventilation    Post pain: adequate analgesia    Post assessment: no apparent anesthetic complications    Post vital signs: stable    Level of consciousness: awake and alert    Nausea/Vomiting: no nausea/vomiting    Complications: none    Transfer of care protocol was followed      Last vitals: Visit Vitals  BP (!) 121/58 (BP Location: Left arm, Patient Position: Lying)   Pulse 60   Temp 36.7 °C (98 °F) (Oral)   Resp 18   Ht 5' 8" (1.727 m)   Wt 54.9 kg (121 lb)   LMP 10/01/2003   SpO2 100%   Breastfeeding No   BMI 18.40 kg/m²     "

## 2024-02-28 NOTE — ANESTHESIA PREPROCEDURE EVALUATION
02/28/2024  Manisha Wynne is a 73 y.o., female.      Pre-op Assessment    I have reviewed the Patient Summary Reports.     I have reviewed the Nursing Notes. I have reviewed the NPO Status.   I have reviewed the Medications.     Review of Systems  Anesthesia Hx:  No problems with previous Anesthesia                Social:  Non-Smoker       Hematology/Oncology:  Hematology Normal   Oncology Normal                                   EENT/Dental:  EENT/Dental Normal           Cardiovascular:    Pacemaker Hypertension    Dysrhythmias atrial fibrillation                                   Pulmonary:        Sleep Apnea                Renal/:  Chronic Renal Disease                Neurological:    Neuromuscular Disease,                                   Endocrine:  Endocrine Normal            Psych:  Psychiatric History anxiety depression                Physical Exam  General: Well nourished, Cooperative and Alert    Airway:  Mallampati: II   Mouth Opening: Normal  TM Distance: Normal  Tongue: Normal  Neck ROM: Normal ROM    Dental:  Intact        Anesthesia Plan  Type of Anesthesia, risks & benefits discussed:    Anesthesia Type: Gen ETT  Intra-op Monitoring Plan: Standard ASA Monitors  Post Op Pain Control Plan: multimodal analgesia  Induction:  IV  Airway Plan: Video  Informed Consent: Informed consent signed with the Patient and all parties understand the risks and agree with anesthesia plan.  All questions answered.   ASA Score: 3    Ready For Surgery From Anesthesia Perspective.     .

## 2024-02-29 ENCOUNTER — TELEPHONE (OUTPATIENT)
Dept: INTERNAL MEDICINE | Facility: CLINIC | Age: 74
End: 2024-02-29

## 2024-02-29 VITALS
SYSTOLIC BLOOD PRESSURE: 116 MMHG | HEART RATE: 60 BPM | WEIGHT: 121 LBS | TEMPERATURE: 98 F | OXYGEN SATURATION: 95 % | RESPIRATION RATE: 17 BRPM | BODY MASS INDEX: 18.34 KG/M2 | HEIGHT: 68 IN | DIASTOLIC BLOOD PRESSURE: 56 MMHG

## 2024-03-01 ENCOUNTER — TELEPHONE (OUTPATIENT)
Dept: ORTHOPEDICS | Facility: CLINIC | Age: 74
End: 2024-03-01
Payer: MEDICARE

## 2024-03-01 NOTE — TELEPHONE ENCOUNTER
Spoke c pt and advised that she could re-wrap the ace bandage around her hand that had come loose in her sleep. Pt stated that he pain was under control and that she could manage re-wrapping. She would call us Monday if she felt she needed more assistance. Patient verbalized understanding and was thankful.

## 2024-03-04 ENCOUNTER — TELEPHONE (OUTPATIENT)
Dept: INTERNAL MEDICINE | Facility: CLINIC | Age: 74
End: 2024-03-04
Payer: MEDICARE

## 2024-03-04 ENCOUNTER — TELEPHONE (OUTPATIENT)
Dept: CARDIOLOGY | Facility: CLINIC | Age: 74
End: 2024-03-04
Payer: MEDICARE

## 2024-03-04 ENCOUNTER — TELEPHONE (OUTPATIENT)
Dept: ORTHOPEDICS | Facility: CLINIC | Age: 74
End: 2024-03-04
Payer: MEDICARE

## 2024-03-04 DIAGNOSIS — R52 PAIN: Primary | ICD-10-CM

## 2024-03-04 NOTE — OP NOTE
[facility]    Orthopedic Surgery Operative Note     Date of Procedure: 2/28/2024     Procedure: Procedure(s) (LRB):  ORIF, ELBOW (Right)       Surgeon(s) and Role:     * Ellen Moe MD - Primary    Assisting Surgeon: Carlton Sweeney MD - Resident    Pre-Operative Diagnosis:   Closed displaced fracture of olecranon process of right ulna with intra-articular extension       Post-Operative Diagnosis: Closed displaced fracture of olecranon process of right ulna with intra-articular extension    Anesthesia: General    Findings of the Procedure: Operative fixation of right comminuted intra-articular olecranon fracture was performed. At conclusion of case, fixation was stable through full arc of motion and varus/valgus stress.     Complications: No    Estimated Blood Loss (EBL): 20cc           Implants:   Implant Name Type Inv. Item Serial No.  Lot No. LRB No. Used Action   Olecranon Implant PLATE      Right 1 Implanted   GUIDE PIN 2.0MM X 9 IN - [id number]  GUIDE PIN 2.0MM X 9 IN  [employer]  Right 1 Implanted   SCREW BONE LOCK HEXALOBE 2.7 X - [id number]  SCREW BONE LOCK HEXALOBE 2.7 X  [employer]  Right 1 Implanted   SCREW LOCK HEXALOBE 2.7 X 16MM - [id number]  SCREW LOCK HEXALOBE 2.7 X 16MM  [employer]  Right 3 Implanted   SCREW BONE NL HEXALOBE 3.5 X 1 - [id number]  SCREW BONE NL HEXALOBE 3.5 X 1  [employer]  Right 1 Implanted   SCREW BNE N SALINAS HEXLB 3.5X14 - [id number]  SCREW BNE N SALINAS HEXLB 3.5X14  [employer]  Right 1 Implanted   SCREW BONE NLHEXALOBE 3.5 X 18 - [id number]  SCREW BONE NLHEXALOBE 3.5 X 18  [employer]  Right 1 Implanted   GUIDE WIRE INSTRUMENT .062 X 6 - [id number]  GUIDE WIRE INSTRUMENT .062 X 6  [employer]  Right 1 Implanted       Specimens:   Specimen (24h ago, onward)      None             Perioperative Antibiotics: Ancef 2g           Condition: Good    Disposition: PACU - hemodynamically stable.    Indications for Procedure:  Manisha Wynne is a  73 y.o. female who suffered a right olecranon fracture due to a fall from standing. Due to the intra-articular and displaced nature of the fracture, surgical intervention was recommended. The risks, benefits, and alternatives to surgical intervention were discussed with the patient and the family and they wished to proceed with surgical intervention.    Procedure in Detail:  The patient was brought to the operating room and placed on the operating table. General anesthesia was induced. She was placed in the lateral decubitus position with all down sites well-padded. A non-sterile tourniquet was placed on the operative arm. The operative arm was placed on an arm board and the nonoperative arm was supported with the elbow bent. The operative extremity was prepped and draped in standard sterile fashion. A formal timeout was performed confirming correct patient, side, site, and procedure.     A standard posterior approach to the proximal ulna was utilized. The fracture site was irrigated and cleaned with a curette and rongeur. The fracture was reduced and held in place provisionally with a Chandler clamp. After reduction was gained, a Steinmann pin was placed orthogonal to the fracture plane, radially away from the footprint of the olecranon plate. Chandler clamp was removed. Radiographs confirmed reduction of the joint surface was maintained. An appropriately sized olecranon plate was selected. A Steinmann pin was then placed in the proximal hole, traversing across the fracture site and just below the joint surface. Radiographs again confirmed reduction of the joint surface appropriate position of the anatomic olecranon plate. One distal bicortical screw was placed. Subsequently proximal unicortical locking screws were placed. Next, the Steinmann pin was removed from the proximal hole of the plate and a proximal locking screw was placed, along a similar trajectory below the joint surface and spanning the entire comminuted  fracture. Three additional locking screws were placed distally. The Steinmann pin used for provisional reduction was removed from the ulna. Patient's elbow was taken through an full arc of motion as well as varus/valgus stress which demonstrated no obvious fracture gapping, although there was some micro-motion visible with terminal flexion. The two proximal unicortical locking screws were then exchanged for longer unicortical locking screws. After this, there was no motion observed at the fracture site with terminal flexion. Final radiographs were obtained confirming anatomic reduction of the joint surface. The wound was irrigated with sterile saline and closed in a layered fashion. Sterile dressings followed by a long arm splint were placed. She was awoken without complication and taken to the PACU in stable condition.     Plan:  Return to clinic in 2 weeks for repeat radiographs. No right elbow ROM x6 weeks. Non-weight bearing right arm.

## 2024-03-04 NOTE — TELEPHONE ENCOUNTER
----- Message from Eliana Yates sent at 3/4/2024 11:34 AM CST -----  Contact: Home  [phone]  Caller is requesting an earlier appointment then we can schedule.  Caller is requesting a message be sent to the provider.  If this is for urgent care symptoms, did you offer other providers at this location, providers at other locations, or [facility]? (yes, no, n/a):  N/A  If this is for the patients physical, did you offer to schedule next available and put on wait list, or to see NP or PA for their physical?  (yes, no, n/a):  N/A  When is the next available appointment with their provider:  03/14/2024.  Reason for the appointment:  Follow up from last visit.  Patient preference of timeframe to be scheduled: 03/13/2024 anytime, or 03/14/2024 Early in the morning or the afternoon.   Would the patient like a call back, or a response through their [facility] portal?:   Call

## 2024-03-04 NOTE — TELEPHONE ENCOUNTER
Left a voice message informing patient of wait list status.   Rendering Text In Billing: The  specimen was grossed and processed into a slide.

## 2024-03-04 NOTE — TELEPHONE ENCOUNTER
----- Message from Katerine Grant sent at 3/4/2024  2:06 PM CST -----  Contact: [phone]  Type: Returning a call    Who left a message?Ludmila Marvin MA    When did the practice call? Today    Comments:

## 2024-03-05 ENCOUNTER — TELEPHONE (OUTPATIENT)
Dept: ORTHOPEDICS | Facility: CLINIC | Age: 74
End: 2024-03-05
Payer: MEDICARE

## 2024-03-05 NOTE — TELEPHONE ENCOUNTER
LVM for patient stating that we did not put a referral in for home health and we are not familiar with the physician that did. I informed her that she will begin PT in 6 weeks

## 2024-03-05 NOTE — TELEPHONE ENCOUNTER
Spoke with pt she stated that her therapist had questions about PT I informed her I didn't see a referral in for PT so I advise her to just wait until her schedule appt on 3/14/23

## 2024-03-06 ENCOUNTER — PATIENT MESSAGE (OUTPATIENT)
Dept: ORTHOPEDICS | Facility: CLINIC | Age: 74
End: 2024-03-06
Payer: MEDICARE

## 2024-03-06 ENCOUNTER — DOCUMENTATION ONLY (OUTPATIENT)
Dept: ORTHOPEDICS | Facility: CLINIC | Age: 74
End: 2024-03-06
Payer: MEDICARE

## 2024-03-06 NOTE — PROGRESS NOTES
I spoke with Cherry at  Home Health she stated the OT order was from the ED she didn't know  had surgery so stated she's going to hold off on OT until she get a new order

## 2024-03-08 ENCOUNTER — INFUSION (OUTPATIENT)
Dept: INFECTIOUS DISEASES | Facility: HOSPITAL | Age: 74
End: 2024-03-08
Attending: INTERNAL MEDICINE
Payer: MEDICARE

## 2024-03-08 VITALS
WEIGHT: 122.69 LBS | BODY MASS INDEX: 18.59 KG/M2 | HEART RATE: 61 BPM | DIASTOLIC BLOOD PRESSURE: 65 MMHG | SYSTOLIC BLOOD PRESSURE: 152 MMHG | RESPIRATION RATE: 16 BRPM | OXYGEN SATURATION: 98 % | HEIGHT: 68 IN | TEMPERATURE: 98 F

## 2024-03-08 DIAGNOSIS — N18.30 STAGE 3 CHRONIC KIDNEY DISEASE, UNSPECIFIED WHETHER STAGE 3A OR 3B CKD: Primary | ICD-10-CM

## 2024-03-08 DIAGNOSIS — M81.8 OTHER OSTEOPOROSIS WITHOUT CURRENT PATHOLOGICAL FRACTURE: ICD-10-CM

## 2024-03-08 PROCEDURE — 63600175 PHARM REV CODE 636 W HCPCS: Mod: JZ,JG | Performed by: INTERNAL MEDICINE

## 2024-03-08 PROCEDURE — 96372 THER/PROPH/DIAG INJ SC/IM: CPT

## 2024-03-08 RX ADMIN — DENOSUMAB 60 MG: 60 INJECTION SUBCUTANEOUS at 01:03

## 2024-03-13 ENCOUNTER — TELEPHONE (OUTPATIENT)
Dept: HOME HEALTH SERVICES | Facility: CLINIC | Age: 74
End: 2024-03-13

## 2024-03-13 ENCOUNTER — OFFICE VISIT (OUTPATIENT)
Dept: CARDIOLOGY | Facility: CLINIC | Age: 74
End: 2024-03-13
Payer: MEDICARE

## 2024-03-13 VITALS
BODY MASS INDEX: 24.85 KG/M2 | HEIGHT: 68 IN | HEART RATE: 64 BPM | WEIGHT: 163.94 LBS | DIASTOLIC BLOOD PRESSURE: 86 MMHG | SYSTOLIC BLOOD PRESSURE: 140 MMHG

## 2024-03-13 DIAGNOSIS — E04.2 GOITER, NONTOXIC, MULTINODULAR: ICD-10-CM

## 2024-03-13 DIAGNOSIS — S52.021S CLOSED FRACTURE OF OLECRANON PROCESS OF RIGHT ULNA, SEQUELA: ICD-10-CM

## 2024-03-13 DIAGNOSIS — I49.5 SSS (SICK SINUS SYNDROME): Chronic | ICD-10-CM

## 2024-03-13 DIAGNOSIS — Z95.0 CARDIAC PACEMAKER IN SITU: ICD-10-CM

## 2024-03-13 DIAGNOSIS — I10 PRIMARY HYPERTENSION: Primary | ICD-10-CM

## 2024-03-13 DIAGNOSIS — N18.30 STAGE 3 CHRONIC KIDNEY DISEASE, UNSPECIFIED WHETHER STAGE 3A OR 3B CKD: ICD-10-CM

## 2024-03-13 DIAGNOSIS — Z74.09 IMPAIRED FUNCTIONAL MOBILITY, BALANCE, GAIT, AND ENDURANCE: ICD-10-CM

## 2024-03-13 DIAGNOSIS — E78.5 DYSLIPIDEMIA: ICD-10-CM

## 2024-03-13 DIAGNOSIS — G62.9 NEUROPATHY: Primary | ICD-10-CM

## 2024-03-13 DIAGNOSIS — I10 ESSENTIAL HYPERTENSION: ICD-10-CM

## 2024-03-13 DIAGNOSIS — G95.9 CERVICAL MYELOPATHY: ICD-10-CM

## 2024-03-13 DIAGNOSIS — I70.0 AORTIC CALCIFICATION: ICD-10-CM

## 2024-03-13 DIAGNOSIS — I70.0 ATHEROSCLEROSIS OF AORTA: ICD-10-CM

## 2024-03-13 DIAGNOSIS — I44.0 AV BLOCK, 1ST DEGREE: Chronic | ICD-10-CM

## 2024-03-13 DIAGNOSIS — I45.10 RBBB: ICD-10-CM

## 2024-03-13 DIAGNOSIS — G47.33 OSA (OBSTRUCTIVE SLEEP APNEA): ICD-10-CM

## 2024-03-13 DIAGNOSIS — I48.0 PAROXYSMAL ATRIAL FIBRILLATION: ICD-10-CM

## 2024-03-13 DIAGNOSIS — G60.8 SENSORY PERIPHERAL NEUROPATHY: ICD-10-CM

## 2024-03-13 PROBLEM — S52.031A: Status: RESOLVED | Noted: 2024-02-28 | Resolved: 2024-03-13

## 2024-03-13 PROCEDURE — 1125F AMNT PAIN NOTED PAIN PRSNT: CPT | Mod: CPTII,S$GLB,, | Performed by: INTERNAL MEDICINE

## 2024-03-13 PROCEDURE — 99999 PR PBB SHADOW E&M-EST. PATIENT-LVL IV: CPT | Mod: PBBFAC,,, | Performed by: INTERNAL MEDICINE

## 2024-03-13 PROCEDURE — 1100F PTFALLS ASSESS-DOCD GE2>/YR: CPT | Mod: CPTII,S$GLB,, | Performed by: INTERNAL MEDICINE

## 2024-03-13 PROCEDURE — 1160F RVW MEDS BY RX/DR IN RCRD: CPT | Mod: CPTII,S$GLB,, | Performed by: INTERNAL MEDICINE

## 2024-03-13 PROCEDURE — 3079F DIAST BP 80-89 MM HG: CPT | Mod: CPTII,S$GLB,, | Performed by: INTERNAL MEDICINE

## 2024-03-13 PROCEDURE — 1111F DSCHRG MED/CURRENT MED MERGE: CPT | Mod: CPTII,S$GLB,, | Performed by: INTERNAL MEDICINE

## 2024-03-13 PROCEDURE — 1157F ADVNC CARE PLAN IN RCRD: CPT | Mod: CPTII,S$GLB,, | Performed by: INTERNAL MEDICINE

## 2024-03-13 PROCEDURE — 3077F SYST BP >= 140 MM HG: CPT | Mod: CPTII,S$GLB,, | Performed by: INTERNAL MEDICINE

## 2024-03-13 PROCEDURE — 1159F MED LIST DOCD IN RCRD: CPT | Mod: CPTII,S$GLB,, | Performed by: INTERNAL MEDICINE

## 2024-03-13 PROCEDURE — 3288F FALL RISK ASSESSMENT DOCD: CPT | Mod: CPTII,S$GLB,, | Performed by: INTERNAL MEDICINE

## 2024-03-13 PROCEDURE — 99214 OFFICE O/P EST MOD 30 MIN: CPT | Mod: S$GLB,,, | Performed by: INTERNAL MEDICINE

## 2024-03-13 NOTE — PROGRESS NOTES
Subjective:   Patient ID:  Manisha Wynne is a 73 y.o. female who presents for follow-up of Hypertension      HPI:  Ms. Wynne was last seen November 2022. She is a 72 year-old patient with sick sinus syndrome, syncope and high-grade infrahisian block s/p dcPPM (9/2012) s/p gen change in 2022, hypertension, asymptomatic device detected AF and CEDRIC. Longest AF episodes was 1 hour. Asymptomatic. [place] is <1%. Due to fall risk and low AF burden EP did not recommend OAK.  Patient reports less tremors after changing Metoprolol to Propranolol    She had 2 recent falls,one resulting in radial and ulnar fracture, and multiple bruises.  She is s/p recent orthopedic surgery ( Operative fixation of right comminuted intra-articular olecranon fracture).  Patient is  scheduled for a neurology follow up.    We have reviewed her latest blood work and cardiac testing.  BP is elevated today.      Lab Results   Component Value Date     03/08/2024    K 3.9 03/08/2024     03/08/2024    CO2 24 03/08/2024    BUN 22 03/08/2024    CREATININE 1.2 03/08/2024     03/08/2024    HGBA1C 5.5 11/16/2023    MG 2.1 02/24/2024    AST 22 03/08/2024    ALT 18 03/08/2024    ALBUMIN 3.4 (L) 03/08/2024    PROT 6.4 03/08/2024    BILITOT 0.6 03/08/2024    WBC 7.65 02/24/2024    HGB 9.9 (L) 02/24/2024    HCT 29.8 (L) 02/24/2024    HCT 33 (L) 07/12/2021    MCV 91 02/24/2024     (L) 02/24/2024    INR 1.1 10/07/2022    TSH 3.125 03/08/2024    CHOL 151 03/08/2024    HDL 56 03/08/2024    LDLCALC 80.6 03/08/2024    TRIG 72 03/08/2024       No results found in the last 24 hours.     Review of Systems   Constitutional: Negative.   HENT: Negative.     Eyes: Negative.    Cardiovascular: Negative.  Negative for chest pain, claudication, dyspnea on exertion, irregular heartbeat, leg swelling, near-syncope, palpitations and syncope.   Respiratory: Negative.  Negative for cough, shortness of breath, snoring and wheezing.    Endocrine:  "Negative.  Negative for cold intolerance, heat intolerance, polydipsia, polyphagia and polyuria.   Skin: Negative.    Musculoskeletal:  Positive for arthritis, back pain, falls and muscle weakness.   Gastrointestinal: Negative.    Genitourinary: Negative.    Neurological:  Positive for light-headedness and loss of balance.   Psychiatric/Behavioral:  Positive for depression. The patient is nervous/anxious.        Objective:   Physical Exam  Vitals and nursing note reviewed.   Constitutional:       Appearance: Normal appearance. She is well-developed.      Comments: BP (!) 140/86   Pulse 64   Ht 5' 8" (1.727 m)   Wt 74.3 kg (163 lb 14.6 oz)   LMP 10/01/2003   BMI 24.92 kg/m²      HENT:      Head: Normocephalic.   Eyes:      Pupils: Pupils are equal, round, and reactive to light.   Neck:      Thyroid: No thyromegaly.      Vascular: No carotid bruit.   Cardiovascular:      Rate and Rhythm: Normal rate and regular rhythm.      Pulses: Intact distal pulses.           Carotid pulses are 2+ on the right side and 2+ on the left side.       Radial pulses are 2+ on the right side and 2+ on the left side.        Femoral pulses are 2+ on the right side and 2+ on the left side.       Popliteal pulses are 2+ on the right side and 2+ on the left side.        Dorsalis pedis pulses are 2+ on the right side and 2+ on the left side.        Posterior tibial pulses are 2+ on the right side and 2+ on the left side.      Heart sounds: Normal heart sounds. No murmur heard.     No friction rub. No gallop.   Pulmonary:      Effort: Pulmonary effort is normal. No respiratory distress.      Breath sounds: Normal breath sounds. No wheezing or rales.   Chest:      Chest wall: No tenderness.   Abdominal:      Palpations: Abdomen is soft.   Musculoskeletal:         General: Normal range of motion.      Cervical back: Normal range of motion and neck supple.      Comments: Right arm in a sling.  Forehead laceration with stitches.   Skin:     " General: Skin is warm and dry.   Neurological:      Mental Status: She is alert and oriented to person, place, and time.           Assessment and Plan:     Problem List Items Addressed This Visit          Cardiology Problems    Essential hypertension    SSS (sick sinus syndrome) (Chronic)    AV block, 1st degree (Chronic)    Atrial fibrillation    RBBB    Aortic calcification    Atherosclerosis of aorta       Other    Dyslipidemia    Cardiac pacemaker in situ    CEDRIC (obstructive sleep apnea)    CKD (chronic kidney disease) stage 3, GFR 30-59 ml/min    Sensory peripheral neuropathy    Goiter, nontoxic, multinodular    Impaired functional mobility, balance, gait, and endurance    Closed fracture of olecranon process of right ulna     Other Visit Diagnoses       Primary hypertension    -  Primary    Relevant Orders    Echo            Patient's Medications   New Prescriptions    No medications on file   Previous Medications    ACETAMINOPHEN (TYLENOL) 500 MG TABLET    Take 2 tablets (1,000 mg total) by mouth 2 (two) times daily as needed for Pain. Begin post op day 3.    ASPIRIN (ECOTRIN) 81 MG EC TABLET    Take 1 tablet (81 mg total) by mouth 2 (two) times a day.    BUPROPION (WELLBUTRIN XL) 300 MG 24 HR TABLET    Take 1 tablet by mouth once daily.    FLUOXETINE 40 MG CAPSULE    Take 1 capsule (40 mg total) by mouth once daily.    GABAPENTIN (NEURONTIN) 100 MG CAPSULE    One capsule 2 (two) times daily.    IBUPROFEN (ADVIL,MOTRIN) 600 MG TABLET    Take 1 tablet (600 mg total) by mouth 3 (three) times daily as needed for Pain. Bedside Delivery    LISDEXAMFETAMINE (VYVANSE) 70 MG CAPSULE    Take 70 mg by mouth every morning.    LOSARTAN (COZAAR) 25 MG TABLET    Take 1 tablet (25 mg total) by mouth once daily.    MELATONIN (MELATIN) 5 MG    Take 1 tablet by mouth every evening. (For insomnia)    METHYL SALICYLATE-MENTHOL 15-10% 15-10 % CREA    Apply topically every evening.    PRAVASTATIN (PRAVACHOL) 40 MG TABLET    Take 1  tablet (40 mg total) by mouth once daily.    PROLIA 60 MG/ML SYRG    Inject 60 mg into the skin every 6 (six) months.    PROPRANOLOL (INDERAL) 20 MG TABLET    Take 1 tablet (20 mg total) by mouth 2 (two) times daily.   Modified Medications    No medications on file   Discontinued Medications    HYDROCODONE-ACETAMINOPHEN (NORCO) 5-325 MG PER TABLET    Take 1 tablet by mouth every 6 (six) hours as needed for Pain.    OXYCODONE-ACETAMINOPHEN (PERCOCET) 5-325 MG PER TABLET    Take 1 tablet by mouth every 4 to 6 hours as needed for Pain ((moderate-severe)). POST OP DAYS 1-2     Frequent  falls with injuries.  No history of pre-syncope or syncope.  Recommend neurology follow up.  Increase Losartan to 50 mg daily and monitor BP along with digital medicine program.    Follow up in about 1 year (around 3/13/2025).

## 2024-03-13 NOTE — TELEPHONE ENCOUNTER
DOS: 3/13/24    Unable to reach patient's for the past 3 days despite multiple attempts and voicemails left.  I will reschedule her next week as a 2nd attempt and then sign off if I remain unable to reach her.    QI Vaughn-ACNP [facility]

## 2024-03-14 ENCOUNTER — OFFICE VISIT (OUTPATIENT)
Dept: ORTHOPEDICS | Facility: CLINIC | Age: 74
End: 2024-03-14
Payer: MEDICARE

## 2024-03-14 ENCOUNTER — HOSPITAL ENCOUNTER (OUTPATIENT)
Dept: RADIOLOGY | Facility: OTHER | Age: 74
Discharge: HOME | End: 2024-03-14
Attending: SPECIALIST/TECHNOLOGIST
Payer: MEDICARE

## 2024-03-14 VITALS — WEIGHT: 163.81 LBS | BODY MASS INDEX: 24.83 KG/M2 | HEIGHT: 68 IN

## 2024-03-14 DIAGNOSIS — R52 PAIN: ICD-10-CM

## 2024-03-14 DIAGNOSIS — R52 PAIN: Primary | ICD-10-CM

## 2024-03-14 DIAGNOSIS — Z98.890 POST-OPERATIVE STATE: Primary | ICD-10-CM

## 2024-03-14 PROCEDURE — 1157F ADVNC CARE PLAN IN RCRD: CPT | Mod: CPTII,S$GLB,, | Performed by: SPECIALIST/TECHNOLOGIST

## 2024-03-14 PROCEDURE — 1101F PT FALLS ASSESS-DOCD LE1/YR: CPT | Mod: CPTII,S$GLB,, | Performed by: SPECIALIST/TECHNOLOGIST

## 2024-03-14 PROCEDURE — 3288F FALL RISK ASSESSMENT DOCD: CPT | Mod: CPTII,S$GLB,, | Performed by: SPECIALIST/TECHNOLOGIST

## 2024-03-14 PROCEDURE — 29065 APPL CST SHO TO HAND LNG ARM: CPT | Mod: 58,LT,S$GLB, | Performed by: SPECIALIST/TECHNOLOGIST

## 2024-03-14 PROCEDURE — 1159F MED LIST DOCD IN RCRD: CPT | Mod: CPTII,S$GLB,, | Performed by: SPECIALIST/TECHNOLOGIST

## 2024-03-14 PROCEDURE — 1125F AMNT PAIN NOTED PAIN PRSNT: CPT | Mod: CPTII,S$GLB,, | Performed by: SPECIALIST/TECHNOLOGIST

## 2024-03-14 PROCEDURE — 99024 POSTOP FOLLOW-UP VISIT: CPT | Mod: S$GLB,,, | Performed by: SPECIALIST/TECHNOLOGIST

## 2024-03-14 PROCEDURE — 73080 X-RAY EXAM OF ELBOW: CPT | Mod: TC,FY,RT

## 2024-03-14 PROCEDURE — 99999 PR PBB SHADOW E&M-EST. PATIENT-LVL III: CPT | Mod: PBBFAC,,, | Performed by: SPECIALIST/TECHNOLOGIST

## 2024-03-14 PROCEDURE — 73080 X-RAY EXAM OF ELBOW: CPT | Mod: 26,RT,, | Performed by: RADIOLOGY

## 2024-03-14 NOTE — PROGRESS NOTES
"Ms. Wynne is here today for a post-operative visit.  She is 14 days status post R Olecranon fracture ORIF by Dr. Moe on 2/28/24. She reports that she is we will pain.  She states she has been taking her pain medication only as needed.  She denies any numbness or tingling.  She denies fever, chills, and sweats since the time of the surgery.     Physical exam:    Vitals:    03/14/24 1134   Weight: 74.3 kg (163 lb 12.8 oz)   Height: 5' 8" (1.727 m)   PainSc:   7     Vital signs are stable, patient is afebrile.  Patient is well dressed and well groomed, no acute distress.  Alert and oriented to person, place, and time.  Post op dressing taken down.  Incision is clean, dry and intact.  There is no erythema or exudate.  There is no sign of any infection. She is NVI.  Staples removed without difficulty.      Assessment:  s/p R Olecranon fracture ORIF        Plan:  Manisha was seen today for swelling, pain and post-op evaluation.    Diagnoses and all orders for this visit:    Post-operative state        PO instruction reviewed and provided to patient  We will transition patient to a long-arm fiberglass cast of the right arm for 4 more weeks per protocol  We will order therapy to begin after her 6 week postop appointment.    Reminded patient of strict weight precautions of no lifting pushing or pulling.  Educated patient on cast care.  Provided patient with tendon glide exercises for the hand.  Patient we will follow up in 4 weeks with repeat right elbow x-rays with cast off.    "

## 2024-03-20 DIAGNOSIS — G95.9 CERVICAL MYELOPATHY: Primary | ICD-10-CM

## 2024-03-21 ENCOUNTER — TELEPHONE (OUTPATIENT)
Dept: INTERNAL MEDICINE | Facility: CLINIC | Age: 74
End: 2024-03-21
Payer: MEDICARE

## 2024-03-21 NOTE — TELEPHONE ENCOUNTER
----- Message from Selam Sellers sent at 3/21/2024 11:22 AM CDT -----  Contact: [facility]/PT/Chris/[phone]  Type: Home Health (orders, updates, clarifications, etc.)    Home Health Agency/Nurse:[facility]    Phone number:[phone]    Reason for call:need order     Comments: needs a Verbal order to continue at home  Physical Therapy

## 2024-03-25 ENCOUNTER — OFFICE VISIT (OUTPATIENT)
Dept: HOME HEALTH SERVICES | Facility: CLINIC | Age: 74
End: 2024-03-25
Payer: MEDICARE

## 2024-03-25 DIAGNOSIS — Z98.890 S/P ORIF (OPEN REDUCTION INTERNAL FIXATION) FRACTURE: ICD-10-CM

## 2024-03-25 DIAGNOSIS — Z87.81 S/P ORIF (OPEN REDUCTION INTERNAL FIXATION) FRACTURE: ICD-10-CM

## 2024-03-25 DIAGNOSIS — R26.81 GAIT INSTABILITY: Primary | ICD-10-CM

## 2024-03-25 PROCEDURE — 1159F MED LIST DOCD IN RCRD: CPT | Mod: CPTII,S$GLB,, | Performed by: NURSE PRACTITIONER

## 2024-03-25 PROCEDURE — 1160F RVW MEDS BY RX/DR IN RCRD: CPT | Mod: CPTII,S$GLB,, | Performed by: NURSE PRACTITIONER

## 2024-03-25 PROCEDURE — 3288F FALL RISK ASSESSMENT DOCD: CPT | Mod: CPTII,S$GLB,, | Performed by: NURSE PRACTITIONER

## 2024-03-25 PROCEDURE — 1111F DSCHRG MED/CURRENT MED MERGE: CPT | Mod: CPTII,S$GLB,, | Performed by: NURSE PRACTITIONER

## 2024-03-25 PROCEDURE — 1101F PT FALLS ASSESS-DOCD LE1/YR: CPT | Mod: CPTII,S$GLB,, | Performed by: NURSE PRACTITIONER

## 2024-03-25 PROCEDURE — 99349 HOME/RES VST EST MOD MDM 40: CPT | Mod: S$GLB,,, | Performed by: NURSE PRACTITIONER

## 2024-03-25 PROCEDURE — 3075F SYST BP GE 130 - 139MM HG: CPT | Mod: CPTII,S$GLB,, | Performed by: NURSE PRACTITIONER

## 2024-03-25 PROCEDURE — 3079F DIAST BP 80-89 MM HG: CPT | Mod: CPTII,S$GLB,, | Performed by: NURSE PRACTITIONER

## 2024-03-25 PROCEDURE — 1126F AMNT PAIN NOTED NONE PRSNT: CPT | Mod: CPTII,S$GLB,, | Performed by: NURSE PRACTITIONER

## 2024-03-25 PROCEDURE — 1157F ADVNC CARE PLAN IN RCRD: CPT | Mod: CPTII,S$GLB,, | Performed by: NURSE PRACTITIONER

## 2024-03-27 ENCOUNTER — TELEPHONE (OUTPATIENT)
Dept: INTERNAL MEDICINE | Facility: CLINIC | Age: 74
End: 2024-03-27
Payer: MEDICARE

## 2024-03-27 ENCOUNTER — PATIENT MESSAGE (OUTPATIENT)
Dept: INTERNAL MEDICINE | Facility: CLINIC | Age: 74
End: 2024-03-27
Payer: MEDICARE

## 2024-03-27 NOTE — TELEPHONE ENCOUNTER
----- Message from Holley Glaser sent at 3/27/2024  1:29 PM CDT -----  Contact: [facility]/PT/Chris/[phone]  Patient is returning a phone call.  Who left a message for the patient: Sally  Does patient know what this is regarding:  Yes. She wants to extend for Home health PT for 3 more weeks    Comments: She said she can take it verbally

## 2024-03-29 VITALS
SYSTOLIC BLOOD PRESSURE: 138 MMHG | HEART RATE: 61 BPM | RESPIRATION RATE: 15 BRPM | DIASTOLIC BLOOD PRESSURE: 80 MMHG | TEMPERATURE: 98 F | OXYGEN SATURATION: 98 %

## 2024-03-29 PROBLEM — Z98.890 S/P ORIF (OPEN REDUCTION INTERNAL FIXATION) FRACTURE: Status: ACTIVE | Noted: 2024-03-29

## 2024-03-29 PROBLEM — Z87.81 S/P ORIF (OPEN REDUCTION INTERNAL FIXATION) FRACTURE: Status: ACTIVE | Noted: 2024-03-29

## 2024-03-29 NOTE — ASSESSMENT & PLAN NOTE
--right elbow  --patient reports seeing surgeon postoperatively times; right upper extremity recasted  --reports pain is well controlled  --issues with imbalance; PT/OT ordered

## 2024-03-29 NOTE — ASSESSMENT & PLAN NOTE
--with PT/OT ordered  --transportation information ordered with referral to outpatient case management

## 2024-03-29 NOTE — PROGRESS NOTES
[facility]  Transitional Care Management (TCM) Home Visit    Encounter Provider: Km Melara   PCP: Iris Blue MD  Consult Requested By: Dr. Carlos Alberto Cortez  Admit Date: 2/28/24   IP Discharge Date: 2/28/24  Hospital Length of Stay:1 day  days since discharge (from IP or SNF): 1 month  [facility] Contact Note: 3/1/24  Hospital Diagnosis: No admission diagnoses are documented for this encounter.     HISTORY OF PRESENT ILLNESS      Patient ID: Manisha Wynne is a 73 y.o. female was recently admitted to the hospital, this is their TCM encounter.    Hospital Course Synopsis:    Date of Procedure: 2/28/2024      Procedure: Procedure(s) (LRB):  ORIF, ELBOW (Right)        Surgeon(s) and Role:     * Ellen Moe MD - Primary     Assisting Surgeon: Carlton Sweeney MD - Resident     Pre-Operative Diagnosis:   Closed displaced fracture of olecranon process of right ulna with intra-articular extension        Post-Operative Diagnosis: Closed displaced fracture of olecranon process of right ulna with intra-articular extension     Anesthesia: General     Findings of the Procedure: Operative fixation of right comminuted intra-articular olecranon fracture was performed. At conclusion of case, fixation was stable through full arc of motion and varus/valgus stress.     DECISION MAKING TODAY       Assessment & Plan:  1. Gait instability  Assessment & Plan:  --with PT/OT ordered  --transportation information ordered with referral to outpatient case management    Orders:  -     Ambulatory referral/consult to Home Health; Future; Expected date: 03/30/2024  -     Ambulatory referral/consult to Outpatient Case Management    2. S/P ORIF (open reduction internal fixation) fracture  Assessment & Plan:  --right elbow  --patient reports seeing surgeon postoperatively times; right upper extremity recasted  --reports pain is well controlled  --issues with imbalance; PT/OT ordered           Medication  List on Discharge:     Medication List            Accurate as of March 25, 2024 11:59 PM. If you have any questions, ask your nurse or doctor.                CHANGE how you take these medications      gabapentin 100 MG capsule  Commonly known as: NEURONTIN  One capsule 2 (two) times daily.  What changed:   how much to take  how to take this  when to take this            CONTINUE taking these medications      acetaminophen 500 MG tablet  Commonly known as: TYLENOL  Take 2 tablets (1,000 mg total) by mouth 2 (two) times daily as needed for Pain. Begin post op day 3.     aspirin 81 MG EC tablet  Commonly known as: ECOTRIN  Take 1 tablet (81 mg total) by mouth 2 (two) times a day.     FLUoxetine 40 MG capsule  Take 1 capsule (40 mg total) by mouth once daily.     ibuprofen 600 MG tablet  Commonly known as: ADVIL,MOTRIN  Take 1 tablet (600 mg total) by mouth 3 (three) times daily as needed for Pain. Bedside Delivery     lisdexamfetamine 70 MG capsule  Commonly known as: VYVANSE  Take 70 mg by mouth every morning.     losartan 25 MG tablet  Commonly known as: COZAAR  Take 1 tablet (25 mg total) by mouth once daily.     melatonin 5 mg  Commonly known as: MELATIN  Take 1 tablet by mouth every evening. (For insomnia)     methyl salicylate-menthol 15-10% 15-10 % Crea  Apply topically every evening.     pravastatin 40 MG tablet  Commonly known as: PRAVACHOL  Take 1 tablet (40 mg total) by mouth once daily.     PROLIA 60 mg/mL Syrg  Generic drug: denosumab  Inject 60 mg into the skin every 6 (six) months.     propranoloL 20 MG tablet  Commonly known as: INDERAL  Take 1 tablet (20 mg total) by mouth 2 (two) times daily.     WELLBUTRIN  MG 24 hr tablet  Generic drug: buPROPion  Take 1 tablet by mouth once daily.              Medication Reconciliation:  Were medications changed on discharge? Yes  Were medications in the home? Yes  Is the patient taking the medications as directed? Yes  Does the patient understand the  medications and changes? Yes  Does updated med list accurately reflects meds patient is currently taking? Yes    ENVIRONMENT OF CARE      Family and/or Caregiver present at visit?  No  Name of Caregiver: n/a  History provided by: patient    Advance Care Planning   Advanced Care Planning Status:  Patient has had an ACP conversation  Living Will: Yes  Power of : Yes  LaPOST: No      Impression upon entering the home:  Physical Dwelling: apartment/condo   Appearance of home environment: cleaniness: dirty; cluttered  Functional Status: minimal assistance  Mobility: ambulatory  Nutritional access: adequate intake and access  Home Health: No, and will be referred today   DME/Supplies: cane     Diagnostic tests reviewed/disposition: No diagnosic tests pending after this hospitalization.  Disease/illness education:  Debility  Establishment or re-establishment of referral orders for community resources: No other necessary community resources.   Discussion with other health care providers: No discussion with other health care providers necessary.   Does patient have a PCP at [facility]? Yes   Repatriation plan with PCP? follow-up with PCP within 30d   Does patient have an ostomy (ileostomy, colostomy, suprapubic catheter, nephrostomy tube, tracheostomy, PEG tube, pleurex catheter, cholecystostomy, etc)? No  Were BPAs reviewed? Yes    Social History     Socioeconomic History    Marital status:    Occupational History    Occupation: /Rabbi     Employer: [employer]   Tobacco Use    Smoking status: Former     Current packs/day: 0.00     Average packs/day: 0.3 packs/day for 15.0 years (3.8 ttl pk-yrs)     Types: Cigarettes     Start date: 1967     Quit date: 1982     Years since quittin.7    Smokeless tobacco: Former   Substance and Sexual Activity    Alcohol use: Yes     Comment: no daily or heavy use, ~4 times per month    Drug use: No    Sexual activity: Not Currently      Partners: Male     Social Determinants of Health     Financial Resource Strain: Low Risk  (1/29/2024)    Overall Financial Resource Strain (CARDIA)     Difficulty of Paying Living Expenses: Not very hard   Food Insecurity: No Food Insecurity (1/29/2024)    Hunger Vital Sign     Worried About Running Out of Food in the Last Year: Never true     Ran Out of Food in the Last Year: Never true   Transportation Needs: No Transportation Needs (1/29/2024)    PRAPARE - Transportation     Lack of Transportation (Medical): No     Lack of Transportation (Non-Medical): No   Physical Activity: Insufficiently Active (1/29/2024)    Exercise Vital Sign     Days of Exercise per Week: 1 day     Minutes of Exercise per Session: 60 min   Stress: No Stress Concern Present (1/29/2024)    [place] [employer] - Occupational Stress Questionnaire     Feeling of Stress : Only a little   Social Connections: Moderately Integrated (1/29/2024)    Social Connection and Isolation Panel [NHANES]     Frequency of Communication with Friends and Family: Three times a week     Frequency of Social Gatherings with Friends and Family: Once a week     Attends Congregational Services: More than 4 times per year     Active Member of Clubs or Organizations: Yes     Attends Club or Organization Meetings: More than 4 times per year     Marital Status:    Housing Stability: Low Risk  (1/29/2024)    Housing Stability Vital Sign     Unable to Pay for Housing in the Last Year: No     Number of Places Lived in the Last Year: 2     Unstable Housing in the Last Year: No       OBJECTIVE:     Vital Signs:  Vitals:    03/29/24 0749   BP: 138/80   Pulse: 61   Resp: 15   Temp: 98 °F (36.7 °C)       Review of Systems   Constitutional:  Negative for activity change and appetite change.   HENT:  Negative for congestion and dental problem.    Eyes:  Negative for discharge and itching.   Respiratory:  Negative for choking and chest  tightness.    Cardiovascular:  Negative for chest pain and palpitations.   Gastrointestinal:  Negative for rectal pain and vomiting.   Endocrine: Negative for cold intolerance and heat intolerance.   Genitourinary:  Negative for enuresis and flank pain.   Musculoskeletal:  Positive for gait problem. Negative for myalgias and neck pain.   Skin:  Negative for color change and wound.   Allergic/Immunologic: Negative for environmental allergies and food allergies.   Neurological:  Positive for weakness. Negative for tremors and syncope.   Hematological:  Does not bruise/bleed easily.   Psychiatric/Behavioral:  Negative for decreased concentration and dysphoric mood.      Physical Exam:  Physical Exam  Vitals and nursing note reviewed.   HENT:      Head: Normocephalic.   Eyes:      General: Lids are normal.   Cardiovascular:      Rate and Rhythm: Normal rate.   Pulmonary:      Effort: Pulmonary effort is normal.      Breath sounds: Normal breath sounds and air entry.   Abdominal:      General: There is no distension.      Palpations: Abdomen is soft.   Musculoskeletal:      Cervical back: Normal range of motion.      Comments: Right upper extremity casted   Skin:     General: Skin is warm and dry.   Neurological:      Mental Status: She is alert. Mental status is at baseline.   Psychiatric:         Attention and Perception: Attention normal.         Mood and Affect: Mood normal.         Speech: Speech normal.     INSTRUCTIONS FOR PATIENT:     Scheduled Follow-up, Appts Reviewed with Modifications if Needed: Yes  Future Appointments   Date Time Provider Department Center   4/11/2024  1:30 PM [facility]   4/11/2024  2:00 PM Sarmad Varghese PA-C [facility] HAND [facility]   4/19/2024  1:00 PM ECHO, [facility] ECHOSTR Petar Villalobos   4/24/2024  9:00 AM Jerrod Walker PT [facility]   5/3/2024 10:00 AM Iris Blue MD [facility] IM [place]   5/8/2024 10:00 AM [facility] FLIP2 500 LB LIMIT [facility]  XRAY IP [facility]   5/13/2024  9:00 AM [facility] MAMMO2 BX [facility] MAMMO [facility]   9/11/2024 11:45 AM INJECTION [facility] AMB INF [facility]       Signature: Km Melara NP    Transition of Care Visit:  I have reviewed and updated the history and problem list.  I have reconciled the medication list.  I have discussed the hospitalization and current medical issues, prognosis and plans with the patient/family.

## 2024-03-29 NOTE — PATIENT INSTRUCTIONS
Instructions:  - Continue all medications, treatments and therapies as ordered.   - Follow all instructions, recommendations as discussed.  - Maintain Safety Precautions at all times.  - Attend all medical appointments as scheduled.  - For worsening symptoms: call Primary Care Physician or Nurse Practitioner.  - For emergencies, call 911 or immediately report to the nearest emergency room.  - Limit Risks of environmental exposure to coronavirus/COVID-19 as discussed including: social distancing, hand hygiene, and limiting departures from the home for necessities only.

## 2024-04-02 ENCOUNTER — CLINICAL SUPPORT (OUTPATIENT)
Dept: CARDIOLOGY | Facility: HOSPITAL | Age: 74
End: 2024-04-02
Payer: MEDICARE

## 2024-04-02 ENCOUNTER — PATIENT OUTREACH (OUTPATIENT)
Dept: ADMINISTRATIVE | Facility: HOSPITAL | Age: 74
End: 2024-04-02
Payer: MEDICARE

## 2024-04-02 ENCOUNTER — CLINICAL SUPPORT (OUTPATIENT)
Dept: CARDIOLOGY | Facility: HOSPITAL | Age: 74
End: 2024-04-02
Attending: INTERNAL MEDICINE
Payer: MEDICARE

## 2024-04-02 ENCOUNTER — TELEPHONE (OUTPATIENT)
Dept: INTERNAL MEDICINE | Facility: CLINIC | Age: 74
End: 2024-04-02
Payer: MEDICARE

## 2024-04-02 VITALS — SYSTOLIC BLOOD PRESSURE: 138 MMHG | DIASTOLIC BLOOD PRESSURE: 80 MMHG

## 2024-04-02 DIAGNOSIS — R00.1 BRADYCARDIA, UNSPECIFIED: ICD-10-CM

## 2024-04-02 DIAGNOSIS — Z95.0 PRESENCE OF CARDIAC PACEMAKER: ICD-10-CM

## 2024-04-02 PROCEDURE — 93296 REM INTERROG EVL PM/IDS: CPT | Performed by: INTERNAL MEDICINE

## 2024-04-02 PROCEDURE — 93294 REM INTERROG EVL PM/LDLS PM: CPT | Mod: ,,, | Performed by: INTERNAL MEDICINE

## 2024-04-02 NOTE — TELEPHONE ENCOUNTER
----- Message from Natalee Angel sent at 4/2/2024 10:03 AM CDT -----  Contact: Cherry [phone]  Cherry calling from [facility] in regards to extending pt's PT.  She is requesting orders to continue.  Please call back with a verbal.

## 2024-04-02 NOTE — PROGRESS NOTES
Chart review done. HM updated. Immunizations reviewed & updated. Care Everywhere updated. Remote BP updated.

## 2024-04-05 ENCOUNTER — EXTERNAL HOME HEALTH (OUTPATIENT)
Dept: HOME HEALTH SERVICES | Facility: HOSPITAL | Age: 74
End: 2024-04-05
Payer: MEDICARE

## 2024-04-08 ENCOUNTER — TELEPHONE (OUTPATIENT)
Dept: ORTHOPEDICS | Facility: CLINIC | Age: 74
End: 2024-04-08
Payer: MEDICARE

## 2024-04-09 RX ORDER — PRAVASTATIN SODIUM 40 MG/1
TABLET ORAL
Refills: 0 | OUTPATIENT
Start: 2024-04-09

## 2024-04-09 NOTE — TELEPHONE ENCOUNTER
No care due was identified.  Health [facility] Embedded Care Due Messages. Reference number: [id number].   4/09/2024 2:01:30 PM CDT

## 2024-04-09 NOTE — TELEPHONE ENCOUNTER
Refill Decision Note   Manisha Wynne  is requesting a refill authorization.  Brief Assessment and Rationale for Refill:  Quick Discontinue     Medication Therapy Plan:    Pharmacy is requesting new scripts for the following medications without required information, (sig/ frequency/qty/etc)      Medication Reconciliation Completed: No     Comments: Pharmacies have been requesting medications for patients without required information, (sig, frequency, qty, etc.). In addition, requests are sent for medication(s) pt. are currently not taking, and medications patients have never taken.    We have spoken to the pharmacies about these request types and advised their teams previously that we are unable to assess these New Script requests and require all details for these requests. This is a known issue and has been reported.     Note composed:3:27 PM 04/09/2024

## 2024-04-11 ENCOUNTER — HOSPITAL ENCOUNTER (OUTPATIENT)
Dept: RADIOLOGY | Facility: OTHER | Age: 74
Discharge: HOME | End: 2024-04-11
Attending: SPECIALIST/TECHNOLOGIST
Payer: MEDICARE

## 2024-04-11 ENCOUNTER — OFFICE VISIT (OUTPATIENT)
Dept: ORTHOPEDICS | Facility: CLINIC | Age: 74
End: 2024-04-11
Payer: MEDICARE

## 2024-04-11 VITALS — BODY MASS INDEX: 24.83 KG/M2 | WEIGHT: 163.81 LBS | HEIGHT: 68 IN

## 2024-04-11 DIAGNOSIS — Z98.890 POST-OPERATIVE STATE: Primary | ICD-10-CM

## 2024-04-11 DIAGNOSIS — R52 PAIN: Primary | ICD-10-CM

## 2024-04-11 DIAGNOSIS — S52.021A CLOSED FRACTURE OF OLECRANON PROCESS OF RIGHT ULNA, INITIAL ENCOUNTER: ICD-10-CM

## 2024-04-11 DIAGNOSIS — R52 PAIN: ICD-10-CM

## 2024-04-11 PROCEDURE — 1157F ADVNC CARE PLAN IN RCRD: CPT | Mod: CPTII,S$GLB,, | Performed by: SPECIALIST/TECHNOLOGIST

## 2024-04-11 PROCEDURE — 99024 POSTOP FOLLOW-UP VISIT: CPT | Mod: S$GLB,,, | Performed by: SPECIALIST/TECHNOLOGIST

## 2024-04-11 PROCEDURE — 73080 X-RAY EXAM OF ELBOW: CPT | Mod: 26,HCNC,RT, | Performed by: STUDENT IN AN ORGANIZED HEALTH CARE EDUCATION/TRAINING PROGRAM

## 2024-04-11 PROCEDURE — 1125F AMNT PAIN NOTED PAIN PRSNT: CPT | Mod: CPTII,S$GLB,, | Performed by: SPECIALIST/TECHNOLOGIST

## 2024-04-11 PROCEDURE — 3288F FALL RISK ASSESSMENT DOCD: CPT | Mod: CPTII,S$GLB,, | Performed by: SPECIALIST/TECHNOLOGIST

## 2024-04-11 PROCEDURE — 1100F PTFALLS ASSESS-DOCD GE2>/YR: CPT | Mod: CPTII,S$GLB,, | Performed by: SPECIALIST/TECHNOLOGIST

## 2024-04-11 PROCEDURE — 1159F MED LIST DOCD IN RCRD: CPT | Mod: CPTII,S$GLB,, | Performed by: SPECIALIST/TECHNOLOGIST

## 2024-04-11 PROCEDURE — 73080 X-RAY EXAM OF ELBOW: CPT | Mod: TC,HCNC,FY,RT

## 2024-04-11 PROCEDURE — 99999 PR PBB SHADOW E&M-EST. PATIENT-LVL III: CPT | Mod: PBBFAC,HCNC,, | Performed by: SPECIALIST/TECHNOLOGIST

## 2024-04-11 NOTE — PROGRESS NOTES
"4/11/24  Patient reports 6 weeks status post right olecranon fracture ORIF.  She reports she is in minimal pain.  She has been in a long-arm fiberglass cast for the past 4 weeks.  She denies any numbness or tingling.    3/14/24  Ms. Wynne is here today for a post-operative visit.  She is 14 days status post R Olecranon fracture ORIF by Dr. Moe on 2/28/24. She reports that she is we will pain.  She states she has been taking her pain medication only as needed.  She denies any numbness or tingling.  She denies fever, chills, and sweats since the time of the surgery.     Physical exam:    Vitals:    04/11/24 1353   Weight: 74.3 kg (163 lb 12.8 oz)   Height: 5' 8" (1.727 m)   PainSc:   6   PainLoc: Elbow     Vital signs are stable, patient is afebrile.  Patient is well dressed and well groomed, no acute distress.  Alert and oriented to person, place, and time.  Cast removed and x-rays examined. Incision is clean, dry and intact.  There is no erythema or exudate.  There is no sign of any infection. She is NVI. Able to make a composite fist. Stiffness noted in the elbow. Lacks 60 degrees of extension and can flex to 100 degrees.     Assessment:  s/p R Olecranon fracture ORIF      Plan:  Manisha was seen today for pain.    Diagnoses and all orders for this visit:    Post-operative state  -     Ambulatory referral/consult to Physical/Occupational Therapy; Future    Closed fracture of olecranon process of right ulna, initial encounter  -     Ambulatory referral/consult to Physical/Occupational Therapy; Future      PO instruction reviewed and provided to patient  Therapy ordered.  Message supervisor to aid in scheduling process.  Provided patient with an Ace bandage to be worn p.r.n.   Educated patient on range-of-motion exercises of the elbow wrist and hand for 15 minutes  Educated patient on scar massage   Patient we will follow up in 4 weeks with repeat x-ray  "

## 2024-04-12 ENCOUNTER — OUTPATIENT CASE MANAGEMENT (OUTPATIENT)
Dept: ADMINISTRATIVE | Facility: OTHER | Age: 74
End: 2024-04-12
Payer: MEDICARE

## 2024-04-12 ENCOUNTER — PATIENT MESSAGE (OUTPATIENT)
Dept: INTERNAL MEDICINE | Facility: CLINIC | Age: 74
End: 2024-04-12
Payer: MEDICARE

## 2024-04-12 ENCOUNTER — PATIENT MESSAGE (OUTPATIENT)
Dept: ADMINISTRATIVE | Facility: OTHER | Age: 74
End: 2024-04-12
Payer: MEDICARE

## 2024-04-12 ENCOUNTER — PATIENT MESSAGE (OUTPATIENT)
Dept: ORTHOPEDICS | Facility: CLINIC | Age: 74
End: 2024-04-12
Payer: MEDICARE

## 2024-04-12 NOTE — PROGRESS NOTES
"Outpatient Care Management  Initial Patient Assessment    Patient: Manisha Wynne  MRN: [id number]  Date of Service: 04/12/2024  Completed by: Tamara Cox RN  Referral Date: 03/29/2024  Date of Eligibility: 4/9/2024  Program:   High Risk  Status: Ongoing  Effective Dates: 4/12/2024 - present  Responsible Staff: Tamara Cox RN        Reason for Visit   Patient presents with    OPCM Enrollment Call       Brief Summary:  Manisha Wynne was referred by Km Melara NP for gait instability. Patient qualifies for program based on high risk score of 64.9%.   Active problem list, medical, surgical and social history reviewed. Active comorbidities include acet arthritis of lumbar region, AV block 1st degree s/p pacemaker, syncope, CEDRIC, depression, HTN, paroxysmal A-fib, falls, HLD, neuropathy, tremors of nervous system, and CKD 3 . Areas of need identified by patient include "being more active/getting exercise and preventing falls."   Next steps:   Review HH PT visit/ scheduled with outpatient PT?  Send message to Dr. Cheek for recommendation on new neurologist.   Send message to PCP for PHQ-9 score.   Free from falls?  Send falls edu material through portal.   Ms. Dyer agreed to OPCM RN follow up on or around 4/25/24.    Disability Status  Is the patient alert and oriented (person, place, time, and situation)?: Alert and oriented x 4  Hearing Difficulty or Deaf: yes  Visual Difficulty or Blind: yes  Visual and Hearing Needs Conclusion: Ms. Dyer reports slight difficulty hearing and denies any vision difficulties, had bilateral cataract repair.  Difficulty Concentrating, Remembering or Making Decisions: yes  Communication Difficulty: no  Eating/Swallowing Difficulty: no  Walking or Climbing Stairs Difficulty: yes  Walking or Climbing Stairs: ambulation difficulty, requires equipment (quad cane)  Dressing/Bathing Difficulty: yes  Dressing/Bathing: bathing difficulty, requires equipment (uses grab " bars and a shower chair)  Toileting : Independent  Continence : Incontience - Bladder (wears pads at night)  Difficulty Managing Errands Independently: yes (Neighbors are helping with errands.)  Equipment Currently Used at Home: raised toilet; cane, quad; grab bar; shower chair; walker, rolling  ADL Conclusion Statement: Ms. Dyer reports that she is currently utilizing a quad cane for ambulation; has a shower chair and grab bars for bathing; she has urinary incontinence and wears pads; she states that her neighbors help with cooking and running errands currently; she is currently using Uber to get to her scheduled appt, and voiced that she needs assitance with transportation.  Change in Functional Status Since Onset of Current Illness/Injury: yes        Spiritual Beliefs  Spiritual, Cultural Beliefs, Mandaen Practices, Values that Affect Care: no      Social History     Socioeconomic History    Marital status:    Occupational History    Occupation: /Rabbi     Employer: [employer]   Tobacco Use    Smoking status: Former     Current packs/day: 0.00     Average packs/day: 0.3 packs/day for 15.0 years (3.8 ttl pk-yrs)     Types: Cigarettes     Start date: 1967     Quit date: 1982     Years since quittin.7    Smokeless tobacco: Former   Substance and Sexual Activity    Alcohol use: Yes     Comment: no daily or heavy use, ~4 times per month    Drug use: No    Sexual activity: Not Currently     Partners: Male     Social Determinants of Health     Financial Resource Strain: Low Risk  (2024)    Overall Financial Resource Strain (CARDIA)     Difficulty of Paying Living Expenses: Not very hard   Food Insecurity: No Food Insecurity (2024)    Hunger Vital Sign     Worried About Running Out of Food in the Last Year: Never true     Ran Out of Food in the Last Year: Never true   Transportation Needs: No Transportation Needs (2024)    PRAPARE - Transportation     Lack  of Transportation (Medical): No     Lack of Transportation (Non-Medical): No   Physical Activity: Insufficiently Active (1/29/2024)    Exercise Vital Sign     Days of Exercise per Week: 1 day     Minutes of Exercise per Session: 60 min   Stress: No Stress Concern Present (1/29/2024)    [place] [employer] - Occupational Stress Questionnaire     Feeling of Stress : Only a little   Social Connections: Moderately Integrated (1/29/2024)    Social Connection and Isolation Panel [NHANES]     Frequency of Communication with Friends and Family: Three times a week     Frequency of Social Gatherings with Friends and Family: Once a week     Attends Buddhist Services: More than 4 times per year     Active Member of Clubs or Organizations: Yes     Attends Club or Organization Meetings: More than 4 times per year     Marital Status:    Housing Stability: Low Risk  (1/29/2024)    Housing Stability Vital Sign     Unable to Pay for Housing in the Last Year: No     Number of Places Lived in the Last Year: 2     Unstable Housing in the Last Year: No       Roles and Relationships  Primary Source of Support/Comfort: friend  Name of Support/Comfort Primary Source: Mai  Secondary Source of Support/Comfort: friend  Name of Support/Comfort Secondary Source: Audelia Nieto [phone]      Advance Directives (For Healthcare)  Advance Directive  (If Adv Dir status is received, view document under Adv Dir in header or Chart Review Media tab): Advance Directive currently in Epic.        Patient Reported Insurance  Verified current insurance plan:: [employer]            4/12/2024    12:00 PM 3/8/2024     1:36 PM 9/27/2023     1:07 PM 8/25/2023     2:09 PM 2/23/2023     9:25 AM 12/14/2022     8:00 AM 11/30/2022     7:59 AM   Depression Patient Health Questionnaire   Over the last two weeks how often have you been bothered by little interest or pleasure in doing things More than half the days Not at all  Not at all Not at all Not at all Not at all Not at all   Over the last two weeks how often have you been bothered by feeling down, depressed or hopeless More than half the days Not at all Several days Several days Not at all Not at all Not at all   PHQ-2 Total Score 4 0 1 1 0 0 0   Over the last two weeks how often have you been bothered by trouble falling or staying asleep, or sleeping too much More than half the days     Not at all Not at all   Over the last two weeks how often have you been bothered by feeling tired or having little energy More than half the days     Not at all Not at all   Over the last two weeks how often have you been bothered by a poor appetite or overeating More than half the days     Not at all Not at all   Over the last two weeks how often have you been bothered by feeling bad about yourself - or that you are a failure or have let yourself or your family down More than half the days     Not at all Not at all   Over the last two weeks how often have you been bothered by trouble concentrating on things, such as reading the newspaper or watching television Several days     Not at all Not at all   Over the last two weeks how often have you been bothered by moving or speaking so slowly that other people could have noticed. Or the opposite - being so fidgety or restless that you have been moving around a lot more than usual. More than half the days     Not at all Not at all   Over the last two weeks how often have you been bothered by thoughts that you would be better off dead, or of hurting yourself Not at all     Not at all Not at all   If you checked off any problems, how difficult have these problems made it for you to do your work, take care of things at home or get along with other people? Very difficult         PHQ-9 Score 15     0 0   PHQ-9 Interpretation Moderately Severe     Minimal or None Minimal or None       Learning Assessment       04/12/2024 [address]  (4/12/2024 - Present)   Created by Tamara Cox RN - RN (Nurse) Status: Complete                 PRIMARY LEARNER     Primary Learner Name:  Manisha Wynne ED - 04/12/2024 1350    Relationship:  Patient ED - 04/12/2024 1350    Does the primary learner have any barriers to learning?:  No Barriers ED - 04/12/2024 1350    What is the preferred language of the primary learner?:  English ED - 04/12/2024 1350    Is an  required?:  No ED - 04/12/2024 1350    How does the primary learner prefer to learn new concepts?:  Listening, Reading ED - 04/12/2024 1350    How often do you need to have someone help you read instructions, pamphlets, or written material from your doctor or pharmacy?:  Never ED - 04/12/2024 1350        CO-LEARNER #1     No question answered        CO-LEARNER #2     No question answered        SPECIAL TOPICS     No question answered        ANSWERED BY:     No question answered        Edit History       Tamara Cox, RN - RN (Nurse)   04/12/2024 1350

## 2024-04-12 NOTE — LETTER
Manisha Wynne  [address]  [place] [address]      Dear Manisha Wynne,     I work with [facility] Outpatient Care Management Department. We received a referral to call you to discuss your medical history. These services are free of charge and are offered to [facility] patients who have recently been discharged from any of our facilities or who have medical conditions that may require the skill of a nurse to assist with management.     I am a Registered Nurse who specializes in connecting patients with available medical and financial resources as well as addressing any educational needs that may be indicated.    I attempted to reach you by telephone, but I was unsuccessful. Please call our department so that we can go over some questions with you, regarding your health.    The Outpatient Care Management Department can be reached at [phone], from 8:00AM to 4:30 PM, on Monday thru Friday.     Additionally, [facility] also has a program where a nurse is available 24/7 to answer questions or provide medical advice, their number is [phone].      Thanks,        Tamara Lopez RN  Outpatient Care Management  Phone #: [phone]

## 2024-04-12 NOTE — PROGRESS NOTES
4/12/2024  1st attempt to complete Initial Assessment  for Outpatient Care Management, left message.  Will send portal message with unable to assess letter.

## 2024-04-12 NOTE — LETTER
Manisha Wynne  [address]  [place] [address]    Dear Manisha Wynne,     Welcome to [facility] Outpatient Care Management Program. We are here to assist patients with multiple long-term (chronic) conditions who often need more personalized healthcare.    It was a pleasure talking with you today. My name is Tamara Lopez RN. I look forward to working with you as your Care Manager. I will be contacting you by telephone routinely to help coordinate care and resolve issues.    My goal is to help you function at the healthiest and highest level possible. You can contact me directly at [phone].    As an [facility] patient with [employer], some of the services we provide, at no cost to you, include:     Development of an individualized care plan with a Registered Nurse   Connection with a   Assistance from a Community Health Worker  Connection with available resources and services    Coordinate communication among your care team members   Provide coaching and education  Help you understand your doctor's treatment plan  Help you obtain information about your insurance coverage.    All services provided by [facility]s Outpatient Care Managers and other care team members are coordinated with and communicated to your primary care team.      As part of your enrollment, you will be receiving education materials and more information about these services in your [facility] account, by phone, or through the mail. If you do not wish to participate or receive information, you can Opt Out by contacting our office at [phone].      Sincerely,        Tamara Lopez RN  [facility]   Outpatient Care Management

## 2024-04-14 ENCOUNTER — PATIENT MESSAGE (OUTPATIENT)
Dept: ADMINISTRATIVE | Facility: OTHER | Age: 74
End: 2024-04-14
Payer: MEDICARE

## 2024-04-15 ENCOUNTER — TELEPHONE (OUTPATIENT)
Dept: INTERNAL MEDICINE | Facility: CLINIC | Age: 74
End: 2024-04-15
Payer: MEDICARE

## 2024-04-15 NOTE — TELEPHONE ENCOUNTER
----- Message from Tamara Lopez RN sent at 4/15/2024  8:57 AM CDT -----  Good morning,     I recently enrolled Ms. Dyer into our OPCM program. While completing the PHQ-2, the PHQ-9 was triggered with a score of 15.    Kind regards,    Tamara Lopez RN  [facility]  [phone]

## 2024-04-15 NOTE — TELEPHONE ENCOUNTER
No care due was identified.  [facility] Embedded Care Due Messages. Reference number: [id number].   4/15/2024 3:01:40 PM CDT

## 2024-04-15 NOTE — TELEPHONE ENCOUNTER
----- Message from Ruth Medina sent at 4/15/2024 11:51 AM CDT -----  Contact: Denilson mcconnell/[facility] [phone]  Denilson is calling in regards to a refill on pt's pravastatin (PRAVACHOL) 40 MG tablet 90 tablet.                Thank you

## 2024-04-16 ENCOUNTER — OUTPATIENT CASE MANAGEMENT (OUTPATIENT)
Dept: ADMINISTRATIVE | Facility: OTHER | Age: 74
End: 2024-04-16
Payer: MEDICARE

## 2024-04-16 RX ORDER — PRAVASTATIN SODIUM 40 MG/1
40 TABLET ORAL DAILY
Qty: 90 TABLET | Refills: 1 | Status: SHIPPED | OUTPATIENT
Start: 2024-04-16

## 2024-04-16 NOTE — TELEPHONE ENCOUNTER
Refill Routing Note   Medication(s) are not appropriate for processing by [facility] for the following reason(s):        ED/Hospital Visit since last OV with provider    [facility] action(s):  Defer             Pharmacist review requested: Yes     Appointments  past 12m or future 3m with PCP    Date Provider   Last Visit   11/13/2023 Iris Blue MD   Next Visit   5/3/2024 Iris Blue MD   ED visits in past 90 days: 1        Note composed:10:24 AM 04/16/2024

## 2024-04-16 NOTE — PROGRESS NOTES
4/16/2024  OPCM RN received phone call from Dr. Adia Baird's nurse with questions in regard to recommendation for a new neurologist. OPCM RN returned call, provided information, and will wait for contact back with recommendation.

## 2024-04-16 NOTE — TELEPHONE ENCOUNTER
Refill Decision Note   Manisha Wynne  is requesting a refill authorization.  Brief Assessment and Rationale for Refill:  Approve     Medication Therapy Plan: Multiple ED visits for falls. No changes to Pravachol therapy. Patient followed up with neuro and has FOV with PCP 5/3/24.      Extended chart review required: Yes   Comments:     Note composed:11:51 AM 04/16/2024

## 2024-04-17 ENCOUNTER — OUTPATIENT CASE MANAGEMENT (OUTPATIENT)
Dept: ADMINISTRATIVE | Facility: OTHER | Age: 74
End: 2024-04-17
Payer: MEDICARE

## 2024-04-17 LAB
OHS CV AF BURDEN PERCENT: < 1
OHS CV DC REMOTE DEVICE TYPE: NORMAL
OHS CV ICD SHOCK: NO
OHS CV RV PACING PERCENT: 2.9 %

## 2024-04-17 NOTE — LETTER
Manisha Wynne  [address]  [place] [address]      Dear Manisha Wynne,     I am writing from the Outpatient Complex Care Management Department at [facility].  I received a referral from Tamara Lopez RN to contact you regarding any needs you may have. I was unable to reach you by phone. Please contact me for assistance.     I can be reached at [phone] Monday thru Friday from 8:00am to 4:30pm.      Additionally, [facility] also has a program with a nurse available 24/7 to answer questions or provide medical advice.  [facility] can be reached at [phone].      Sincerely,        Lary Sheridan LCSW  Outpatient Care Management

## 2024-04-18 NOTE — PROGRESS NOTES
5/10/2024  2nd attempt to complete Initial Assessment for Outpatient Care Management, left message.     5/9/2024  Spoke with patient who reports she is currently at work and requested for SW to call back tomorrow at 9:00 AM.    5/2/2024  1st attempt to complete Initial Assessment for Outpatient Care Management, left message. Will send unable to assess letter through portal.    4/25/2024  Received return call from patient who requests to have a conversation with SW tomorrow due to patient having various meetings scheduled today.    4/17/2024  1st attempt to complete Initial Assessment  for Outpatient Care Management, left message. Will send unable to assess letter through portal.

## 2024-04-19 ENCOUNTER — HOSPITAL ENCOUNTER (OUTPATIENT)
Dept: CARDIOLOGY | Facility: HOSPITAL | Age: 74
Discharge: HOME | End: 2024-04-19
Attending: INTERNAL MEDICINE
Payer: MEDICARE

## 2024-04-19 VITALS
HEART RATE: 60 BPM | DIASTOLIC BLOOD PRESSURE: 80 MMHG | BODY MASS INDEX: 24.83 KG/M2 | HEIGHT: 68 IN | SYSTOLIC BLOOD PRESSURE: 118 MMHG | WEIGHT: 163.81 LBS

## 2024-04-19 DIAGNOSIS — I10 PRIMARY HYPERTENSION: ICD-10-CM

## 2024-04-19 LAB
AV INDEX (PROSTH): 0.55
AV MEAN GRADIENT: 8 MMHG
AV PEAK GRADIENT: 16 MMHG
AV VALVE AREA BY VELOCITY RATIO: 2.26 CM²
AV VALVE AREA: 2.08 CM²
AV VELOCITY RATIO: 0.59
BSA FOR ECHO PROCEDURE: 1.89 M2
CV ECHO LV RWT: 0.39 CM
DOP CALC AO PEAK VEL: 2.02 M/S
DOP CALC AO VTI: 43.75 CM
DOP CALC LVOT AREA: 3.8 CM2
DOP CALC LVOT DIAMETER: 2.2 CM
DOP CALC LVOT PEAK VEL: 1.2 M/S
DOP CALC LVOT STROKE VOLUME: 91.19 CM3
DOP CALCLVOT PEAK VEL VTI: 24 CM
E WAVE DECELERATION TIME: 273.49 MSEC
E/A RATIO: 0.67
E/E' RATIO: 6.36 M/S
ECHO LV POSTERIOR WALL: 0.89 CM (ref 0.6–1.1)
EJECTION FRACTION: 65 %
FRACTIONAL SHORTENING: 34 % (ref 28–44)
INTERVENTRICULAR SEPTUM: 0.58 CM (ref 0.6–1.1)
IVRT: 105.61 MSEC
LA MAJOR: 5.35 CM
LA MINOR: 5.37 CM
LA WIDTH: 4.04 CM
LEFT ATRIUM SIZE: 4.11 CM
LEFT ATRIUM VOLUME INDEX MOD: 27.1 ML/M2
LEFT ATRIUM VOLUME INDEX: 40.2 ML/M2
LEFT ATRIUM VOLUME MOD: 51.01 CM3
LEFT ATRIUM VOLUME: 75.65 CM3
LEFT INTERNAL DIMENSION IN SYSTOLE: 3.04 CM (ref 2.1–4)
LEFT VENTRICLE DIASTOLIC VOLUME INDEX: 51.51 ML/M2
LEFT VENTRICLE DIASTOLIC VOLUME: 96.84 ML
LEFT VENTRICLE MASS INDEX: 56 G/M2
LEFT VENTRICLE SYSTOLIC VOLUME INDEX: 19.3 ML/M2
LEFT VENTRICLE SYSTOLIC VOLUME: 36.2 ML
LEFT VENTRICULAR INTERNAL DIMENSION IN DIASTOLE: 4.59 CM (ref 3.5–6)
LEFT VENTRICULAR MASS: 105.3 G
LV LATERAL E/E' RATIO: 5.38 M/S
LV SEPTAL E/E' RATIO: 7.78 M/S
MV A" WAVE DURATION": 8.85 MSEC
MV PEAK A VEL: 1.05 M/S
MV PEAK E VEL: 0.7 M/S
MV STENOSIS PRESSURE HALF TIME: 79.31 MS
MV VALVE AREA P 1/2 METHOD: 2.77 CM2
OHS CV RV/LV RATIO: 0.71 CM
PISA TR MAX VEL: 2.59 M/S
PULM VEIN S/D RATIO: 1.17
PV PEAK D VEL: 0.42 M/S
PV PEAK S VEL: 0.49 M/S
RA MAJOR: 5.35 CM
RA PRESSURE ESTIMATED: 3 MMHG
RA WIDTH: 4.32 CM
RIGHT VENTRICULAR END-DIASTOLIC DIMENSION: 3.28 CM
RV TB RVSP: 6 MMHG
SINUS: 2.75 CM
STJ: 2.57 CM
TDI LATERAL: 0.13 M/S
TDI SEPTAL: 0.09 M/S
TDI: 0.11 M/S
TR MAX PG: 27 MMHG
TRICUSPID ANNULAR PLANE SYSTOLIC EXCURSION: 1.99 CM
TV REST PULMONARY ARTERY PRESSURE: 30 MMHG
Z-SCORE OF LEFT VENTRICULAR DIMENSION IN END DIASTOLE: -1.35
Z-SCORE OF LEFT VENTRICULAR DIMENSION IN END SYSTOLE: -0.5

## 2024-04-19 PROCEDURE — 93306 TTE W/DOPPLER COMPLETE: CPT | Mod: 26,,, | Performed by: INTERNAL MEDICINE

## 2024-04-19 PROCEDURE — 93306 TTE W/DOPPLER COMPLETE: CPT

## 2024-04-19 NOTE — OP NOTE
[facility] - Surgery ([place])  Surgery Department  Operative Note    SUMMARY     Date of Procedure: 2/28/2024     Procedure: Procedure(s) (LRB):  ORIF, ELBOW (Right)     Surgeons and Role:     * Ellen Moe MD - Primary    Attestation  I was present and scrubbed for the entire procedure

## 2024-04-23 ENCOUNTER — DOCUMENT SCAN (OUTPATIENT)
Dept: HOME HEALTH SERVICES | Facility: HOSPITAL | Age: 74
End: 2024-04-23
Payer: MEDICARE

## 2024-04-23 ENCOUNTER — CLINICAL SUPPORT (OUTPATIENT)
Dept: REHABILITATION | Facility: HOSPITAL | Age: 74
End: 2024-04-23
Attending: NEUROLOGICAL SURGERY
Payer: MEDICARE

## 2024-04-23 DIAGNOSIS — M25.60 RANGE OF MOTION DEFICIT: ICD-10-CM

## 2024-04-23 DIAGNOSIS — Z98.890 POST-OPERATIVE STATE: ICD-10-CM

## 2024-04-23 DIAGNOSIS — S52.021A CLOSED FRACTURE OF OLECRANON PROCESS OF RIGHT ULNA, INITIAL ENCOUNTER: ICD-10-CM

## 2024-04-23 DIAGNOSIS — M25.521 ELBOW PAIN, RIGHT: Primary | ICD-10-CM

## 2024-04-23 PROCEDURE — 97530 THERAPEUTIC ACTIVITIES: CPT

## 2024-04-23 PROCEDURE — 97165 OT EVAL LOW COMPLEX 30 MIN: CPT

## 2024-04-23 NOTE — PATIENT INSTRUCTIONS
[facility], OCCUPATIONAL THERAPY  HOME EXERCISE PROGRAM   Soak hand/elbow in hot water, use hot wet compress or run under hot water   For 5-10 min in the morning or before exercises to improve mobility and decrease   Stiffness.      Cold pack x 10 min at night for pain, swelling as needed.     Continue scar massage 1-2 x daily to elbow.     Complete the following exercises with 10 repetitions each, 3-5 x/day.   AROM: Supination / Pronation   With your elbow by your side, turn your palm up then turn your palm down.    AROM: Wrist Flexion / Extension             FISHTAIL POSITION  Bend your wrist forward and back as far as possible.          AROM: Wrist Radial / Ulnar Deviation     SHAKING HANDS  Make a fist then bend your wrist toward your body, then away.             AROM: Elbow Flexion / Extension          Bend and straighten elbow (over towel roll)  in 3 different positions: thumb up, palm up, palm down.    Copyright © [employer]. All rights reserved.     Therapist: CHIOMA Reyes CHT

## 2024-04-23 NOTE — PLAN OF CARE
[facility]  Occupational Therapy Initial Evaluation     Name: Manisha Wynne  Clinic Number: [id number]    Therapy Diagnosis:   Encounter Diagnoses   Name Primary?    Post-operative state     Closed fracture of olecranon process of right ulna, initial encounter     Elbow pain, right Yes    Range of motion deficit        Medical Diagnosis: R Olecranon fracture with ORIF 2/28/24  ICD-10:   Z98.890 (ICD-10-CM) - Post-operative state   S52.021A (ICD-10-CM) - Closed fracture of olecranon process of right ulna, initial encounter       Physician: Sarmad Varghese PA-C; Dr. DESIREE Guevara   Physician Orders: Eval and Treat  R Elbow Olecranon ORIF  DOS: 2/28/24  12 Visits    Surgical Procedure and Date: 2/28/24,   Procedure(s) (LRB):  ORIF, ELBOW (Right)       Evaluation Date: 4/23/2024    Plan of Care Certification Period: /23/24    Date of Return to MD: 5/10/24    Visit # / Visits authorized: 1 / 12  Insurance Authorization Period Expiration: PENDING     FOTO #1: FSM 47% / GOAL 65%  FOTO #2:   FOTO #3:     Precautions:  Standard and Weightbearing    Time In:945  Time Out: 1030  Total Appointment Time (timed & untimed codes): 45 minutes    Subjective     Date of Onset: 2/28/24    History of Current Condition/Mechanism of Injury: Physician reports: Manisha Wynne is a 73 y.o. female who suffered a right olecranon fracture due to a fall from standing. Due to the intra-articular and displaced nature of the fracture, surgical intervention was recommended.     Falls: yes    Involved Side: right   Dominant Side: Right  Imaging: See Chart   Prior Therapy: none     Pain:  Functional Pain Scale Rating 0-10:   3/10 on average  1/10 at best  6/10 at worst  Location: R elbow   Description: dull pain, occasionally intense   Aggravating Factors: straightening elbow   Easing Factors: rest    Occupation:  Retired     Functional Limitations/Social History:    Previous functional status includes:  Independent with all ADLs. Uses straight cane for ambulation     Current Functional Status   Home/Living environment: lives alone           Limitation of Functional Status as follows:   ADLs/IADLs:     - Feeding: Mod difficulty with cutting, knife/fork use     - Bathing: mod IND with bathing chair and hand held shower     - Dressing/Grooming: Mod IND     - Home Management: limited R hand use for lifting, gripping, carrying, holding things.     - Driving: limited R hand use for driving, using UBER     - Gripping/pinching: limited , pinch in R hand      Leisure: taking care of cats/cat litter , cooking     Patient's Goals for Therapy: regain use of elbow to do regular activities     Past Medical History/Physical Systems Review:   Manisha Wynne  has a past medical history of Abnormal Pap smear, Atrial fibrillation, AV block, 1st degree, C. difficile diarrhea, Cataract, Depression, Facet arthritis of lumbar region, Falls, Hyperlipidemia, Hypertension, Neuropathy, Other specified cardiac dysrhythmias(427.89), Sleep apnea, Syncope, and Tremors of nervous system.    Manisha Wynne  has a past surgical history that includes Tonsillectomy; Dilation and curettage of uterus; Cardiac pacemaker placement (09/07/2012); Endoscopic ultrasound of upper gastrointestinal tract (N/A, 7/5/2019); Clemons tooth extraction; Nasal septoplasty (N/A, 12/19/2019); Application of cartilage graft (Bilateral, 12/19/2019); Surgical removal of nasal turbinate (Bilateral, 12/19/2019); Open reduction and internal fixation (ORIF) of fracture of distal radius (Left, 1/24/2020); Myelography (N/A, 5/4/2021); Cataract extraction w/  intraocular lens implant (Right, 5/16/2022); Cataract extraction w/  intraocular lens implant (Left, 6/20/2022); Replacement of pacemaker generator (Left, 10/7/2022); Posterior fusion of cervical spine with laminectomy (N/A, 11/14/2022); and Open reduction and internal fixation (ORIF) of injury of elbow  (Right, 2/28/2024).    Manisha has a current medication list which includes the following prescription(s): acetaminophen, aspirin, bupropion, fluoxetine, gabapentin, ibuprofen, lisdexamfetamine, losartan, melatonin, methyl salicylate-menthol 15-10%, pravastatin, prolia, and propranolol.    Review of patient's allergies indicates:  No Known Allergies     Objective     Observation/Appearance:  Skin dry and Joint stiffness.  10 cm posterior elbow incision, healing well;     Edema. Measured in centimeters.   4/23/2024       Proximal Wrist Crease 15.8 cm   Proximal Palmar Crease     MCPs        UE  ROM. Measured in degrees.   4/23/2024       Wrist ext/flex  30 / 45    Sup/pro  70 / 70b       Elbow ext / flex  52/122           Sensation- intact     Special Tests: NT        Strength (Dyanmometer) and Pinch Strength (Pinch Gauge)  Measured in pounds and psi. Average of three trials.   4/23/2024 4/23/2024    Right  Left    Rung II TBA TBA   Key Pinch     3pt Pinch     2pt Pinch         Manual Muscle Testing:  Biceps 3-/5  Brachialis 3-/5  Brachioradialis 3-/5  Triceps 2/5        CMS Impairment/Limitation/Restriction for FOTO Survey    Therapist reviewed FOTO scores for Manisha Wynne on 4/23/2024.   FOTO documents entered into [employer] - see Media section.    Limitation Score: ___47___%      Goal: ___65___%  Category: Self Care       Treatment     Total Treatment time separate from Evaluation time:25    Manisha received the treatments listed below:      Supervised Modalities after being cleared for contradictions: 10 min MH to R elbow to increase blood flow, circulation and tissue elasticity prior to therex.     Therapeutic Activities to improve functional performance for 15  minutes, including:  - wrist flex, ext, RD, UD, sup/pron   - elbow flex, extension over towel roll in 3 positions, palm up, thumb up, palm down x 10 reps each, 3-4 x daily  - reviewed use MH applications to decrease stiffness before exercises    - CP as needed for pain,swelling at night   - compression sleeve in place of ace wrap provided size D x 2 for day/night use.   - continue scar massage 2x daily     Patient Education and Home Exercises      Education provided:   -role of OT, goals for OT, scheduling/cancellations, insurance limitations with patient.  -Additional Education provided: YES    Written Home Exercises Provided: yes.  Exercises were reviewed and Manisha was able to demonstrate them prior to the end of the session.    Manisha demonstrated good  understanding of the education provided.     Pt was advised to perform these exercises free of pain, and to stop performing them if pain occurs.    See EMR under Patient Instructions for exercises provided 4/23/2024.    Assessment     Manisha Wynne is a 73 y.o. female referred to outpatient occupational therapy and presents with a medical diagnosis of R Olecranon Fx w/ORIF .      Assessment of Occupational Performance   Performance Deficits    Physical:  Joint Mobility  Joint Stability  Muscle Power/Strength  Muscle Endurance  Skin Integrity/Scar Formation  Edema   Strength  Gross Motor Coordination  Pain    Cognitive:  NONE     Psychosocial:    HABITS   ROUTINES      Following medical record review it is determined that pt will benefit from occupational therapy services in order to maximize pain free and/or functional use of right hand. The following goals were discussed with the patient and patient is in agreement with them as to be addressed in the treatment plan. The patient's rehab potential is Good.     Anticipated barriers to occupational therapy: transportation    Plan of care discussed with patient: Yes   Patient's spiritual, cultural and educational needs considered and patient is agreeable to the plan of care and goals as stated below:     Medical Necessity is demonstrated by the following  Occupational Profile/History  Co-morbidities and personal factors that may impact the  plan of care [x] LOW: Brief chart review  [] MODERATE: Expanded chart review   [] HIGH: Extensive chart review    Moderate / High Support Documentation: NONE      Examination  Performance deficits relating to physical, cognitive or psychosocial skills that result in activity limitations and/or participation restrictions  [x] LOW: addressing 1-3 Performance deficits  [] MODERATE: 3-5 Performance deficits  [] HIGH: 5+ Performance deficits (please support below)    Moderate / High Support Documentation: NONE      Treatment Options [x] LOW: Limited options  [] MODERATE: Several options  [] HIGH: Multiple options      Decision Making/ Complexity Score: Low Complexity        The following goals were discussed with the patient and patient is in agreement with them as to be addressed in the treatment plan.     Short Term Goals: (6 weeks):  1)  Patient to be IND with HEP and modalities for pain management  2)  Increase ROM 10-20 degrees to increase functional hand use for ADLs/work/leisure activities  3)   Decrease edema .1-.5mm to increase joint mobility /flexibility for functional hand use.   4)  Assess   and pinch and set goals accordingly      Long Term Goals :To be met by discharge (12 weeks)   1)  Increase  strength 2-8 lbs. For driving, cooking   2) Increase pinch strength 1-4 psi's for opening bottles and FM coordination   3)   Increase ROM 21-40 degrees to increase functional hand use for ADLs/work/leisure activities  4) Patient to score at _60_% OR MORE (FSM)  on FOTO to demonstrate improved perception of functional R hand  Use.    Plan   Certification Period/Plan of care expiration: 4/23/2024 to 7/23/24.    Outpatient Occupational Therapy 1-2 times weekly for 6-12 weeks to include the following interventions: Paraffin, Fluidotherapy, Manual therapy/joint mobilizations, Modalities for pain management, US 3 mhz, Therapeutic exercises/activities., Strengthening, Orthotic Fabrication/Fit/Training, Edema  Control, and Scar Management.    I certify the need for these services furnished under the plan of treatment and while under my care.     ____________________________________________________________________  Physician/Referring Practitioner   Date of Signature       Desi Medellin OT, CHT

## 2024-04-24 ENCOUNTER — CLINICAL SUPPORT (OUTPATIENT)
Dept: REHABILITATION | Facility: OTHER | Age: 74
End: 2024-04-24
Attending: NEUROLOGICAL SURGERY
Payer: MEDICARE

## 2024-04-24 DIAGNOSIS — M54.9 BACK PAIN, UNSPECIFIED BACK LOCATION, UNSPECIFIED BACK PAIN LATERALITY, UNSPECIFIED CHRONICITY: ICD-10-CM

## 2024-04-24 DIAGNOSIS — R26.89 BALANCE PROBLEM: ICD-10-CM

## 2024-04-24 DIAGNOSIS — R29.898 DECREASED STRENGTH OF TRUNK AND BACK: Primary | ICD-10-CM

## 2024-04-24 DIAGNOSIS — R68.89 DECREASED FUNCTIONAL ACTIVITY TOLERANCE: ICD-10-CM

## 2024-04-24 PROCEDURE — 97110 THERAPEUTIC EXERCISES: CPT

## 2024-04-24 PROCEDURE — 97163 PT EVAL HIGH COMPLEX 45 MIN: CPT

## 2024-04-24 PROCEDURE — 97112 NEUROMUSCULAR REEDUCATION: CPT

## 2024-04-24 NOTE — PLAN OF CARE
[facility]  Physical Therapy Lumbar Evaluation      Name: Manisha Wynne  Clinic Number: [id number]    Therapy Diagnosis:   Encounter Diagnoses   Name Primary?    Back pain, unspecified back location, unspecified back pain laterality, unspecified chronicity     Decreased strength of trunk and back Yes    Balance problem     Decreased functional activity tolerance      Physician: Nicolette Cheek MD    Physician Orders: PT Eval and Treat  Medical Diagnosis from Referral:   1. Decreased strength of trunk and back    2. Back pain, unspecified back location, unspecified back pain laterality, unspecified chronicity    3. Balance problem    4. Decreased functional activity tolerance      Evaluation Date: 4/24/2024  Authorization Period Expiration: 2/21/2025  Plan of Care Expiration: 7/24/2024  Reassessment Due: 5/24/2024  Visit # / Visits authorized: 1/1    Time In: 9:15  Time Out: 10:20  Total Billable Time: 60 minutes  INSURANCE and OUTCOMES: Fee for Service with FOTO Outcomes 1/3    Precautions: standard, fall, R elbow fracture    Pattern of pain determined: 1 Movement responder    Subjective     Date of onset/History of current condition: Manisha reports she had neck sx Nov 2022, she just had her follow up. She has had a lot of falls after the sx but they don't know why she is falling. She has low back pain that she just exits with.the pain can increase with moving certain ways. 8 weeks out from elbow sx. She came to healthy back a long time ago when the program first started, but she does not really remember how the program works. Tries to avoid picking up things that are over 10#. Uses Tylenol and heat to manage pain. She has neuropathy in the feet and hammer toes. Feels like she is not in control of her body.    History of Present Illness:  Manisha Wynne is a 73 y.o. female (former  at [facility]) who presents as a referral from Dr. Mcduffie for evaluation of  "cervical myelopathy. The patient reports that she has had several falls from [id number], which prompted her to seek neurologic evaluation. The falls have resulted in injuries, including breaking her wrist. She also has numbness, tingling, and burning in her feet. She denies pain in her neck, but endorses in her mid- and low back. Her pain is 10/10 in the legs, 10/10 in the back, and 0/10 in the neck and arms. The pain is intermittent. It is made worse by sitting. It improves with standing. She denies any change in bowel/bladder habit.      She endorses handwriting changes, dropping objects, and balance/gait difficulty. She denies fine motor changes. She has not seen pain management for her neck, but she is seeing Dr. Booker for "sciatica."      CT of the cervical spine (the patient has a non-MRI-conditional compatible pacemaker in place) was obtained and personally reviewed, demonstrating significant stenosis at C5-C6>C4-C5. Flex ex shows some mild instability of C3 on C4.      She lives alone with her [place] cats. Her family is in [place] and [facility]. She does have friends nearby who will help to care for her in perioperative periods. She takes ASA 81 daily.     As of 2/22/24, the patient returns in scheduled follow-up. Overall, she has been well, though she is still falling (most recently in December). She sustained a head laceration after a fall at [facility] in November that now appears well healed.      She requests a referral to neurology for her ongoing neuropathy.      She is also complaining of low back pain with sciatica. She denies any new weakness associated with this and is interested in doing [facility].      Medical History:   Past Medical History:   Diagnosis Date    Abnormal Pap smear     Atrial fibrillation     AV block, 1st degree 07/25/2012    C. difficile diarrhea     RESOLVED    Cataract     Depression 07/24/2012    Facet arthritis of lumbar region 03/31/2015    Falls     3 falls in the last 6 " mos--noted 6/19/19    Hyperlipidemia     Hypertension 07/24/2012    Neuropathy     Other specified cardiac dysrhythmias(427.89)     Sleep apnea     Syncope 07/24/2012    Tremors of nervous system     hands bilaterally       Surgical History:   Manisha Wynne  has a past surgical history that includes Tonsillectomy; Dilation and curettage of uterus; Cardiac pacemaker placement (09/07/2012); Endoscopic ultrasound of upper gastrointestinal tract (N/A, 7/5/2019); [place] tooth extraction; Nasal septoplasty (N/A, 12/19/2019); Application of cartilage graft (Bilateral, 12/19/2019); Surgical removal of nasal turbinate (Bilateral, 12/19/2019); Open reduction and internal fixation (ORIF) of fracture of distal radius (Left, 1/24/2020); Myelography (N/A, 5/4/2021); Cataract extraction w/  intraocular lens implant (Right, 5/16/2022); Cataract extraction w/  intraocular lens implant (Left, 6/20/2022); Replacement of pacemaker generator (Left, 10/7/2022); Posterior fusion of cervical spine with laminectomy (N/A, 11/14/2022); and Open reduction and internal fixation (ORIF) of injury of elbow (Right, 2/28/2024).    Medications:   Manisha has a current medication list which includes the following prescription(s): acetaminophen, aspirin, bupropion, fluoxetine, gabapentin, ibuprofen, lisdexamfetamine, losartan, melatonin, methyl salicylate-menthol 15-10%, pravastatin, prolia, and propranolol.    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 Moving the wrong way, lifting heavy  Easing Factors: Tylenol, heat  Disturbed Sleep: No    Pattern of pain questions:  1.  Where is your pain the worst? back  2.  Is your pain constant or intermittent? constant  3.  Does bending forward make your typical pain worse? yes  4.  Since the start of your back pain, has there been a change in your bowel or bladder? no  5.  What can't you do now that you use to be able to do? Prolonged sitting, prolonged standing, sitting the wrong way. Moving the wrong way, lifting heavy    Pts goals: Get healthy and get stronger    Red Flag Screening:   Cough/Sneeze Strain: Sometimes  Bladder/Bowel: (--)  Falls: (+)  Night pain: (--)  Unexplained weight loss: (--)  General health: fair    Objective      Postural examination/scapula alignment: Rounded shoulder, Head forward, Increased kyphosis, and L shoulder elevated, thoracic scoliosis   Joint integrity: Hard end feel  Skin integrity:WNL   Edema: None  Correction of posture: better with lumbar roll  Sitting: fair  Standing: fair    GAIT:  Assistive Device used: straight cane  Level of Assistance: independent  Patient displays the following gait deviations: unsteady gait.    MOVEMENT LOSS - Lumbar   Norms ROM Loss Initial   Flexion Fingers touch toes, sacral angle >/= 70 deg, uniform spinal curvature, posterior weight shift  major loss   Extension ASIS surpasses toes, spine of scapulae surpasses heels, uniform spinal curve major loss Worse than Flexion   Side glide Right  major loss P! On R   Side glide Left  major loss minimal P! On L   Rotation Right PT observes contralateral shoulder moderate loss   Rotation Left PT observes contralateral shoulder major loss     Lower Extremity Strength  Right LE  Left LE    Hip flexion: 4-/5 Hip flexion: 4-/5   Hip abduction: 3/5 Hip  abduction: 3/5   Hip adduction:  4/5 Hip adduction:  4/5   Hip External Rotation 3/5 Hip External Rotation 3/5   Hip Internal rotation   3+/5 Hip Internal rotation 3+/5   Knee Flexion 4/5 Knee Flexion 4/5   Knee Extension 4/5 Knee Extension 4/5   Ankle dorsiflexion: 4/5 Ankle dorsiflexion: 4/5   Ankle plantarflexion: 4/5 Ankle plantarflexion: 4/5         NEUROLOGICAL SCREENING:     Sensory deficits: Intact to light touch    Reflexes:    Left Right   Patella Tendon 1+ 1+   Clonus (--) (--)     REPEATED TEST MOVEMENTS:    Baseline symptoms:  Repeated Flexion in Standing better   Repeated Extension in Standing better   Repeated Flexion in lying better   Repeated Extension in lying  NT       Lumbar testing Visit 1   Lumbar Isometric Testing on Med X equipment: Testing administered by PT    Test Initial Midpoint Final   Date 4/24/2024     ROM 6-36 deg     Max Peak Torque 80      Min Peak Torque 32      Flex/Ext Ratio 2.5     % below normative data 38     % gain from initial test Not available visit 1           Intake/Outcome for FOTO Lumbar Surveys    Therapist reviewed FOTO scores for Manisha Wynne on 4/24/2024  FOTO documents entered into [employer] - see Media section.    Intake Score: 24%   Visit 5 Score:   Visit 10 Score:   Discharge Score:   Goal Score: 15%             Treatment     Treatment Time In: 9:50  Treatment Time Out: 10:20  Total Treatment time separate from Evaluation: 30 minutes    Manisha received therapeutic exercises to develop/improve posture, lumbar ROM, strength, and muscular endurance for 10 minutes including the following exercises:       [x] LTR x10  [x] SKTC x10  [x] PPT x10  [x] Bridges x10  [x] EIS x10  []       MedX Testing:   Patient received neuromuscular education to engage spinal musculature correctly for motor control and engagement of musculature for 15 minutes including the MedX exercise component and practice and standard testing. MedX dynamic exercise and baseline isometric  test performed with instructions to guide the patient safely through the testing procedure. Patient instructed to perform isometric test correctly and safely while building to an optimal force with a pain-free effort. Patient also instructed that they should feel support/pressure from MedX restraints but no pain/discomfort. Patient demonstrated appropriate understanding of information.         4/24/2024     2:04 PM   [facility] Therapy   Visit Number 1   VAS Pain Rating 8   Extension in Standing 10   Flexion in Lying 10   Lumbar Extension Seat Pad 1   Femur Restraint 6   Top Dead Center 24   Counterweight 210   Lumbar Flexion 36   Lumbar Extension 6   Lumbar Peak Torque 80 ft. lbs.   Min Torque 32   Test Percent Below Normative Data 38 %        Therapeutic Education/Activity provided for 10 minutes:   - Patient was given an [facility] Visit 1 handout which discusses the following:  - what to expect in therapy  - an overview of the program, including health coaching and wellness  - importance of spinal hygiene, proper posture, lifting mechanics, sleep quality, and nutrition/hydration   - [place] roll trialed, recommended, and purchase information was provided.  - Patient received a handout regarding anticipated muscular soreness following the isometric test and strategies for management were reviewed with patient including stretching, using ice and scheduled rest.   - Patient received verbal education on the following:   - Healthy [facility] program   - purpose of the isometric test  - safe progression of lumbar strengthening, wellness approach, and systemic strengthening.   - safe usage of MedX machine and testing protocols.        Written Home Exercises Provided: yes.    HEP AS FOLLOWS:    LTR x10  SKTC x10  PPT x10  Bridges x10  EIS x10    Exercises were reviewed and Manisha was able to demonstrate them prior to the end of the session.  Manisha demonstrated fair  understanding of the education provided.      See EMR under Patient Instructions for exercises provided 4/24/2024.    Assessment     Manisha is a 73 y.o. female referred to [facility] with a medical diagnosis of M54.9 (ICD-10-CM) - Back pain, unspecified back location, unspecified back pain laterality, unspecified chronicity. Manisha presents to therapy with complaints of low back pain that causes difficulty with Prolonged sitting, prolonged standing, sitting the wrong way, Moving the wrong way, and lifting heavy. The evaluation shows decrease in cardinal plane mobility,  and decreased strength findings. Pt appears to have bilateral movement preference and would benefit from general mobility exercises to improve pain free movement and functional activity. Manisha has been given initial HEP and will also benefit from strength and stability exercises as they progress through the program. Pt was able to complete Lumbar MedX testing and is 38% below age strength averages.     Pain Pattern: 1 Movement responder       Pt prognosis is Good.     Pt will benefit from skilled outpatient Physical Therapy to address the deficits stated above and in the chart below, to provide pt/family education, and to maximize pt's level of independence. Based on the above history and physical examination an active physical therapy program is recommended.      Plan of care discussed with patient: Yes  Pt's spiritual, cultural and educational needs considered and patient is agreeable to the plan of care and goals as stated below:     Anticipated Barriers for therapy: Balance, chronic pain, Transportation (uber)    PT Evaluation Completed? Yes    Medical necessity is demonstrated by the following problem list:    History  Co-morbidities and personal factors that may impact the plan of care [] LOW: no personal factors / co-morbidities  [] MODERATE: 1-2 personal factors / co-morbidities  [x] HIGH: 3+ personal factors / co-morbidities    Moderate / High Support Documentation:    Co-morbidities affecting plan of care: Tremors of nervous system, Neuropathy, Hypertension, Hyperlipidemia, Falls, Depression, Atrial fibrillation    Personal Factors:   lifestyle     Examination  Body Structures and Functions, activity limitations and participation restrictions that may impact the plan of care [] LOW: addressing 1-2 elements  [] MODERATE: 3+ elements  [x] HIGH: 4+ elements (please support below)    Moderate / High Support Documentation: Prolonged sitting, prolonged standing, sitting the wrong way. Moving the wrong way, lifting heavy     Clinical Presentation [] LOW: stable  [x] MODERATE: Evolving  [] HIGH: Unstable     Decision Making/ Complexity Score: high         GOALS: Pt is in agreement with the following goals.    Short term goals:  6 weeks or 10 visits   - Pt will demonstrate increased lumbar ROM by at least 6 degrees from the initial ROM value with improvements noted in functional ROM and ability to perform ADLs. Appropriate and Ongoing  - Pt will demonstrate increased MedX average isometric strength value by 15% from initial test resulting in improved ability to perform bending, lifting, and carrying activities safely, confidently. Appropriate and Ongoing  - Pt will report a reduction in worst pain score by 1-2 points for improved tolerance for transfers sit to stand. Appropriate and Ongoing  - Pt able to perform HEP correctly with minimal cueing or supervision from therapist to encourage independent management of symptoms. Appropriate and Ongoing    Long term goals: 10 weeks or 20 visits   - Pt will demonstrate increased lumbar ROM by at least 12 degrees from initial ROM value, resulting in improved ability to perform functional forward bending while standing and sitting. Appropriate and Ongoing  - Pt will demonstrate increased MedX average isometric strength value by 25% from initial test resulting in improved ability to perform bending, lifting, and carrying activities safely and  confidently. Appropriate and Ongoing  - Pt to demonstrate ability to independently control and reduce their pain through posture positioning and mechanical movements throughout a typical day. Appropriate and Ongoing  - Pt will demonstrate reduced pain and improved functional outcomes as reported on the FOTO by reaching a limitation score of < or = 15% or less in order to demonstrate subjective improvement in pt's condition.   Appropriate and Ongoing  - Pt will demonstrate independence with the HEP at discharge. Appropriate and Ongoing  - Pt will be able to complete safe bending and lifting up to 10# without increased low back pain (patient goal) Appropriate and Ongoing    Plan     Outpatient physical therapy 2x week for 10 weeks or 20 visits to include the following:   - Patient education  - Therapeutic exercise  - Manual therapy  - Performance testing   - Neuromuscular Re-education  - Therapeutic activity   - Modalities    Pt may be seen by PTA as part of the rehabilitation team.     Therapist: Jerrod Walker, PT  4/24/2024

## 2024-04-25 ENCOUNTER — OUTPATIENT CASE MANAGEMENT (OUTPATIENT)
Dept: ADMINISTRATIVE | Facility: OTHER | Age: 74
End: 2024-04-25
Payer: MEDICARE

## 2024-04-25 RX ORDER — PROPRANOLOL HYDROCHLORIDE 20 MG/1
20 TABLET ORAL 2 TIMES DAILY
Qty: 180 TABLET | Refills: 3 | Status: SHIPPED | OUTPATIENT
Start: 2024-04-25

## 2024-04-25 NOTE — PROGRESS NOTES
Outpatient Care Management  Plan of Care Follow Up Visit    Patient: Manisha Wynne  MRN: [id number]  Date of Service: 04/25/2024  Completed by: Tamara Lopez RN  Referral Date: 03/29/2024    Reason for Visit   Patient presents with    OPCM RN Follow Up Call       Brief Summary: OPCM RN followed up with Mrs. Dyer today for care plan review.    Next Steps:   Free from falls?  How are exercises going? Review if Mrs. Dyer feels steady.  Review home exercise program.  Review pain scale/quality of pain.  Explore how Mrs. Dyer is feeling. Strength/endurance improving?  Mrs. Dyer agreed to OPCM RN follow up on or around 5/9/24.

## 2024-04-29 ENCOUNTER — TELEPHONE (OUTPATIENT)
Dept: PODIATRY | Facility: CLINIC | Age: 74
End: 2024-04-29
Payer: MEDICARE

## 2024-04-29 NOTE — TELEPHONE ENCOUNTER
Spoke with patient to confirm her appointment on 05/01/2024 with Dr. Rao(Podiatry), patient has verbally confirmed appt.

## 2024-05-01 ENCOUNTER — CLINICAL SUPPORT (OUTPATIENT)
Dept: REHABILITATION | Facility: OTHER | Age: 74
End: 2024-05-01
Payer: MEDICARE

## 2024-05-01 ENCOUNTER — OFFICE VISIT (OUTPATIENT)
Dept: PODIATRY | Facility: CLINIC | Age: 74
End: 2024-05-01
Payer: MEDICARE

## 2024-05-01 VITALS
DIASTOLIC BLOOD PRESSURE: 61 MMHG | HEART RATE: 60 BPM | BODY MASS INDEX: 23.23 KG/M2 | SYSTOLIC BLOOD PRESSURE: 123 MMHG | HEIGHT: 68 IN | WEIGHT: 153.31 LBS

## 2024-05-01 DIAGNOSIS — R68.89 DECREASED FUNCTIONAL ACTIVITY TOLERANCE: Primary | ICD-10-CM

## 2024-05-01 DIAGNOSIS — M20.40 HAMMER TOE, UNSPECIFIED LATERALITY: ICD-10-CM

## 2024-05-01 DIAGNOSIS — M79.671 RIGHT FOOT PAIN: ICD-10-CM

## 2024-05-01 DIAGNOSIS — R26.89 BALANCE PROBLEM: ICD-10-CM

## 2024-05-01 DIAGNOSIS — G60.8 SENSORY PERIPHERAL NEUROPATHY: ICD-10-CM

## 2024-05-01 DIAGNOSIS — L84 PRE-ULCERATIVE CALLUSES: Primary | ICD-10-CM

## 2024-05-01 DIAGNOSIS — R29.898 DECREASED STRENGTH OF TRUNK AND BACK: ICD-10-CM

## 2024-05-01 PROCEDURE — 99999 PR PBB SHADOW E&M-EST. PATIENT-LVL III: CPT | Mod: PBBFAC,HCNC,, | Performed by: PODIATRIST

## 2024-05-01 PROCEDURE — 97110 THERAPEUTIC EXERCISES: CPT | Mod: HCNC

## 2024-05-01 PROCEDURE — 99214 OFFICE O/P EST MOD 30 MIN: CPT | Mod: HCNC,S$GLB,, | Performed by: PODIATRIST

## 2024-05-01 PROCEDURE — 1125F AMNT PAIN NOTED PAIN PRSNT: CPT | Mod: HCNC,CPTII,S$GLB, | Performed by: PODIATRIST

## 2024-05-01 PROCEDURE — 97112 NEUROMUSCULAR REEDUCATION: CPT | Mod: HCNC

## 2024-05-01 PROCEDURE — 3074F SYST BP LT 130 MM HG: CPT | Mod: HCNC,CPTII,S$GLB, | Performed by: PODIATRIST

## 2024-05-01 PROCEDURE — 3078F DIAST BP <80 MM HG: CPT | Mod: HCNC,CPTII,S$GLB, | Performed by: PODIATRIST

## 2024-05-01 PROCEDURE — 1157F ADVNC CARE PLAN IN RCRD: CPT | Mod: HCNC,CPTII,S$GLB, | Performed by: PODIATRIST

## 2024-05-01 RX ORDER — DEXAMETHASONE SODIUM PHOSPHATE 4 MG/ML
INJECTION, SOLUTION INTRA-ARTICULAR; INTRALESIONAL; INTRAMUSCULAR; INTRAVENOUS; SOFT TISSUE
COMMUNITY
Start: 2024-02-28 | End: 2024-05-03

## 2024-05-01 RX ORDER — LOSARTAN POTASSIUM 25 MG/1
1 TABLET ORAL DAILY
COMMUNITY
Start: 2023-10-10 | End: 2024-06-03 | Stop reason: SDUPTHER

## 2024-05-01 RX ORDER — METHOCARBAMOL 500 MG/1
TABLET, FILM COATED ORAL
COMMUNITY
Start: 2024-02-25 | End: 2024-05-03 | Stop reason: SDUPTHER

## 2024-05-01 RX ORDER — DENOSUMAB 60 MG/ML
INJECTION SUBCUTANEOUS
COMMUNITY
Start: 2023-08-07

## 2024-05-01 RX ORDER — PRAVASTATIN SODIUM 40 MG/1
1 TABLET ORAL DAILY
COMMUNITY
Start: 2023-10-10

## 2024-05-01 RX ORDER — CEFAZOLIN 2 G/1
INJECTION, POWDER, FOR SOLUTION INTRAMUSCULAR; INTRAVENOUS
COMMUNITY
Start: 2024-02-28 | End: 2024-05-03

## 2024-05-01 RX ORDER — PROPRANOLOL HYDROCHLORIDE 20 MG/1
1 TABLET ORAL 2 TIMES DAILY
COMMUNITY
Start: 2023-10-10 | End: 2024-05-03

## 2024-05-01 RX ORDER — MELATONIN 5 MG
1 CAPSULE ORAL DAILY
COMMUNITY
Start: 2023-10-10 | End: 2024-05-03

## 2024-05-01 RX ORDER — FENTANYL CITRATE 50 UG/ML
INJECTION, SOLUTION INTRAMUSCULAR; INTRAVENOUS
COMMUNITY
Start: 2024-02-28 | End: 2024-05-03

## 2024-05-01 RX ORDER — FLUOXETINE HYDROCHLORIDE 60 MG/1
1 TABLET, FILM COATED ORAL; ORAL DAILY
COMMUNITY
Start: 2023-11-06

## 2024-05-01 RX ORDER — SODIUM CHLORIDE, SODIUM LACTATE, POTASSIUM CHLORIDE, CALCIUM CHLORIDE 600; 310; 30; 20 MG/100ML; MG/100ML; MG/100ML; MG/100ML
INJECTION, SOLUTION INTRAVENOUS
COMMUNITY
Start: 2024-02-28 | End: 2024-05-03

## 2024-05-01 RX ORDER — ONDANSETRON HYDROCHLORIDE 2 MG/ML
INJECTION, SOLUTION INTRAVENOUS
COMMUNITY
Start: 2024-02-28 | End: 2024-05-03

## 2024-05-01 RX ORDER — BUPIVACAINE HYDROCHLORIDE 2.5 MG/ML
INJECTION, SOLUTION EPIDURAL; INFILTRATION; INTRACAUDAL
COMMUNITY
Start: 2024-02-28 | End: 2024-05-03

## 2024-05-01 RX ORDER — PROPOFOL 10 MG/ML
INJECTION, EMULSION INTRAVENOUS
COMMUNITY
Start: 2024-02-28 | End: 2024-05-03

## 2024-05-01 RX ORDER — BUPROPION HYDROCHLORIDE 300 MG/1
1 TABLET ORAL DAILY
COMMUNITY
Start: 2023-10-10

## 2024-05-01 NOTE — PROGRESS NOTES
[facility]  Physical Therapy Treatment Note     Name: Manisha Wynne  Clinic Number: [id number]    Therapy Diagnosis:   Encounter Diagnoses   Name Primary?    Decreased functional activity tolerance Yes    Decreased strength of trunk and back     Balance problem      Physician: Nicolette Cheek MD    Visit Date: 5/1/2024    Physician Orders: PT Eval and Treat  Medical Diagnosis from Referral:   1. Decreased strength of trunk and back    2. Back pain, unspecified back location, unspecified back pain laterality, unspecified chronicity    3. Balance problem    4. Decreased functional activity tolerance       Evaluation Date: 4/24/2024  Authorization Period Expiration: 2/21/2025  Plan of Care Expiration: 7/24/2024  Reassessment Due: 5/24/2024  Visit # / Visits authorized: 1/1     Time In: 10:30 AM  Time Out: 11:35 AM  Total Billable Time: 65 minutes  INSURANCE and OUTCOMES: Fee for Service with FOTO Outcomes 1/3     Precautions: standard, fall, R elbow fracture     Pattern of pain determined: 1 Movement responder  MedX Testing:MedX testing visit 1      Subjective     Mnaisha Wynne reports feeling good after evaluation with no c/o pain. Didn't do HEP because lost her copy of them.      Patient reports tolerating previous visit well and w/o c/o  Patient reports their pain to be 3/10 on a 0-10 scale with 0 being no pain and 10 being the worst pain imaginable.  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  Leisure: used to walk with a friend,       Prior Level of Function: I with increased time  Current Level of Function: Difficulty with cleaning  DME owned/used: Cane, shower chair, hand held shower, grab bars  Gym Membership: No    Pt goals: Pts goals: Get healthy and get stronger     Objective      Lumbar  Isometric Testing on Med X  equipment: Testing administered by PT    Test Initial Baseline Midpoint Final   Date 4/24/2024     ROM 6-36 deg     Max Peak Torque 80      Min Peak Torque 32      Flex/Ext Ratio 2.5     % variance  normative data 38     % change from initial test N/A visit 1         Outcomes:  Intake Score: 24%  Visit 6 Score:   Visit 10 Score:   Discharge Score:  Goal Score: 15%     Treatment     Manisha received the treatments listed below:      Medical MedX Treatment as follows:  MedX exercise started day 2:  Patient received neuromuscular education for 15 minutes via participation on the Medical MedX Machine. Therapist assisted patient in isolating and engaging spinal stabilization musculature in order to improve functional ability and postural control. Patient performed exercise with therapist guidance in order to accurately use pacer function, avoid valsalva, and optimally exert effort within a safe and effective range via the Ana Exertion Rating Scale. Patient instructed to perform at a midrange of exertion and to complete 15-20 repetitions within appropriate split time, with proper technique, and while maintaining safety.          5/1/2024    11:47 AM   [facility]   Visit Number 2   VAS Pain Rating 3   Extension in Standing 10   Flexion in Lying 10   Lumbar Weight 40 lbs   Repetitions 15   Rating of Perceived Exertion 6   Ice - Z Lie (in min.) 5         Manisha participated in neuromuscular re-education activities to improve balance, coordination, proprioception, motor control and/or posture for 15 minutes. The following activities were included:    - MedX strengthening     Manisha participated in therapeutic exercises to develop strength, endurance, ROM, flexibility, posture, and core stabilization for 50 minutes including:    LTR x10  SKTC x10  PPT x10  Bridges x10  Figure 4 stretch x1' ea  EIS x10    Peripheral muscle strengthening which included one set of 15-20 repetitions at a slow and controlled 10-13  second per rep pace focused on strengthening supporting musculature in order to improve body mechanics and functional mobility. Patient and therapist focused on proper form during treatment to ensure optimal strengthening of each targeted muscle group.  Machines utilized included:Torso rotation, Leg Ext, Leg Curl, and Leg Press  To be added next visit:Rowing, Triceps, Biceps, and Hip Abd      Manisha participated in dynamic functional therapeutic activities to improve functional performance and simulate household and community activities for 00  minutes. The following activities were included:    Manisha received manual therapy techniques for 00  minutes. The following activities were included:      Pt given cold pack for 5 minutes to low back in z-lying.    Patient Education and Home Exercises     Home exercises include:  LTR x10  SKTC x10  PPT x10  Bridges x10  EIS x10    Cardio program (V5): -  Lifting education (V11): -  Posture/Lumbar roll:   Fridge Magnet Discharge handout (date given): -  Equipment at home/gym membership:     Education provided:   - PT role and POC  - HEP review  - MedX and peripheral strengthening; purpose and form    Written Home Exercises Provided: yes.  Exercises were reviewed and Manisha was able to demonstrate them prior to the end of the session.  Manisha demonstrated good  understanding of the education provided.     See EMR under Patient Instructions for exercises provided prior visit.    Assessment     Manisha presents to second healthy back visit reporting decreased low back pain, was able to demo HEP with Mod VC for form. Pt was able to tolerate Medical MedX machine well as follows:  MedX exercise started  and patient tolerated  neuro reeducation training, strengthening, and endurance training on the lumbar MedX at 50% of max peak torque according to the initial visit isometric test. Pt was able to complete 15 reps, with 6 RPE.  May benefit from reducing weight at following visit  to 35 ft-lbs rather than 40 ft-lbs.    Pt was also able to complete half of the peripheral strengthening exercises without increased discomfort and will complete the complete circuit next visit as tolerated.    Patient is making good progress towards established goals.  Pt will continue to benefit from skilled outpatient physical therapy to address the deficits stated in the impairment chart, provide pt/family education and to maximize pt's level of independence in the home and community environment.     Anticipated Barriers for therapy: none  Pt's spiritual, cultural and educational needs considered and pt agreeable to plan of care and goals as stated below:     GOALS: Pt is in agreement with the following goals.     Short term goals:  6 weeks or 10 visits   - Pt will demonstrate increased lumbar ROM by at least 6 degrees from the initial ROM value with improvements noted in functional ROM and ability to perform ADLs. Appropriate and Ongoing  - Pt will demonstrate increased MedX average isometric strength value by 15% from initial test resulting in improved ability to perform bending, lifting, and carrying activities safely, confidently. Appropriate and Ongoing  - Pt will report a reduction in worst pain score by 1-2 points for improved tolerance for transfers sit to stand. Appropriate and Ongoing  - Pt able to perform HEP correctly with minimal cueing or supervision from therapist to encourage independent management of symptoms. Appropriate and Ongoing     Long term goals: 10 weeks or 20 visits   - Pt will demonstrate increased lumbar ROM by at least 12 degrees from initial ROM value, resulting in improved ability to perform functional forward bending while standing and sitting. Appropriate and Ongoing  - Pt will demonstrate increased MedX average isometric strength value by 25% from initial test resulting in improved ability to perform bending, lifting, and carrying activities safely and confidently. Appropriate  and Ongoing  - Pt to demonstrate ability to independently control and reduce their pain through posture positioning and mechanical movements throughout a typical day. Appropriate and Ongoing  - Pt will demonstrate reduced pain and improved functional outcomes as reported on the FOTO by reaching a limitation score of < or = 15% or less in order to demonstrate subjective improvement in pt's condition.   Appropriate and Ongoing  - Pt will demonstrate independence with the HEP at discharge. Appropriate and Ongoing  - Pt will be able to complete safe bending and lifting up to 10# without increased low back pain (patient goal) Appropriate and Ongoing    Plan     Continue with established Plan of Care towards established PT goals.     Therapist: Mian Good, PT  5/1/2024

## 2024-05-01 NOTE — PROGRESS NOTES
Subjective:      Patient ID: Manisha Wynne is a 73 y.o. female.    Chief Complaint:   Follow-up (Bilateral foot pain/PCP- 1/13/2023/Iris Oden MD)    Manisha is a 73 y.o. female who presents to the clinic for evaluation and treatment of feet  Manisha has a past medical history of Abnormal Pap smear, Atrial fibrillation, AV block, 1st degree (07/25/2012), C. difficile diarrhea, Cataract, Depression (07/24/2012), Facet arthritis of lumbar region (03/31/2015), Falls, Hyperlipidemia, Hypertension (07/24/2012), Neuropathy, Other specified cardiac dysrhythmias(427.89), Sleep apnea, Syncope (07/24/2012), and Tremors of nervous system.  .  This patient has documented high risk feet requiring routine maintenance secondary to peripheral neuropathy.    Patient returns to clinic for foot evaluation/c/o pain  Patient discusses her fall did not injure her feet  She relates that she is trying to find out why she has been falling more     PCP: Iris Blue MD    Date Last Seen by PCP 5/3/24    Current shoe gear:  Affected Foot : bacilio Snyder        Hemoglobin A1C   Date Value Ref Range Status   11/16/2023 5.5 4.0 - 5.6 % Final     Comment:     ADA Screening Guidelines:  5.7-6.4%  Consistent with prediabetes  >or=6.5%  Consistent with diabetes    High levels of fetal hemoglobin interfere with the HbA1C  assay. Heterozygous hemoglobin variants (HbS, HgC, etc)do  not significantly interfere with this assay.   However, presence of multiple variants may affect accuracy.     08/05/2022 5.3 4.0 - 5.6 % Final     Comment:     ADA Screening Guidelines:  5.7-6.4%  Consistent with prediabetes  >or=6.5%  Consistent with diabetes    High levels of fetal hemoglobin interfere with the HbA1C  assay. Heterozygous hemoglobin variants (HbS, HgC, etc)do  not significantly interfere with this assay.   However, presence of multiple variants may affect accuracy.     06/10/2021 5.4 4.0 - 5.6 % Final     Comment:     ADA  Screening Guidelines:  5.7-6.4%  Consistent with prediabetes  >or=6.5%  Consistent with diabetes    High levels of fetal hemoglobin interfere with the HbA1C  assay. Heterozygous hemoglobin variants (HbS, HgC, etc)do  not significantly interfere with this assay.   However, presence of multiple variants may affect accuracy.     06/10/2021 5.4 4.0 - 5.6 % Final     Comment:     ADA Screening Guidelines:  5.7-6.4%  Consistent with prediabetes  >or=6.5%  Consistent with diabetes    High levels of fetal hemoglobin interfere with the HbA1C  assay. Heterozygous hemoglobin variants (HbS, HgC, etc)do  not significantly interfere with this assay.   However, presence of multiple variants may affect accuracy.          Past Medical History:   Diagnosis Date    Abnormal Pap smear     Atrial fibrillation     AV block, 1st degree 07/25/2012    C. difficile diarrhea     RESOLVED    Cataract     Depression 07/24/2012    Facet arthritis of lumbar region 03/31/2015    Falls     3 falls in the last 6 mos--noted 6/19/19    Hyperlipidemia     Hypertension 07/24/2012    Neuropathy     Other specified cardiac dysrhythmias(427.89)     Sleep apnea     Syncope 07/24/2012    Tremors of nervous system     hands bilaterally     Past Surgical History:   Procedure Laterality Date    APPLICATION OF CARTILAGE GRAFT Bilateral 12/19/2019    Procedure: APPLICATION, CARTILAGE GRAFT AURICULAR JEZ;  Surgeon: Martinez Flores III, MD;  Location: [facility];  Service: ENT;  Laterality: Bilateral;    CARDIAC PACEMAKER PLACEMENT  09/07/2012    [id number]    CATARACT EXTRACTION W/  INTRAOCULAR LENS IMPLANT Right 5/16/2022    Procedure: EXTRACTION, CATARACT, WITH IOL INSERTION;  Surgeon: Ines Aguilera MD;  Location: [facility] OR;  Service: Ophthalmology;  Laterality: Right;  Catalys     CATARACT EXTRACTION W/  INTRAOCULAR LENS IMPLANT Left 6/20/2022    Procedure: EXTRACTION, CATARACT, WITH IOL INSERTION;  Surgeon: Ines Aguilera MD;  Location: [facility]  OR;  Service: Ophthalmology;  Laterality: Left;  Catalys     DILATION AND CURETTAGE OF UTERUS      Hx of precancerous cells?    ENDOSCOPIC ULTRASOUND OF UPPER GASTROINTESTINAL TRACT N/A 7/5/2019    Procedure: ULTRASOUND, UPPER GI TRACT, ENDOSCOPIC;  Surgeon: Kev Calderon MD;  Location: [facility] ENDO (2ND FLR);  Service: Endoscopy;  Laterality: N/A;  Pacemaker-Adam   appt confirmed-rb    MYELOGRAPHY N/A 5/4/2021    Procedure: Myelogram  CERVICAL, THORACIC AND LUMBAR;  Surgeon: [facility] Diagnostic Provider;  Location: [facility] OR 2ND FLR;  Service: Radiology;  Laterality: N/A;    NASAL SEPTOPLASTY N/A 12/19/2019    Procedure: SEPTOPLASTY, NOSE;  Surgeon: Martinez Flores III, MD;  Location: [facility] OR;  Service: ENT;  Laterality: N/A;  FOLLOW DR SALVATORE KIMBROUGH PROTOCOL    OPEN REDUCTION AND INTERNAL FIXATION (ORIF) OF FRACTURE OF DISTAL RADIUS Left 1/24/2020    Procedure: ORIF, FRACTURE, RADIUS, DISTAL-left;  Surgeon: Ellen Moe MD;  Location: [facility] OR;  Service: Orthopedics;  Laterality: Left;    OPEN REDUCTION AND INTERNAL FIXATION (ORIF) OF INJURY OF ELBOW Right 2/28/2024    Procedure: ORIF, ELBOW;  Surgeon: Ellen Moe MD;  Location: [facility] OR;  Service: Orthopedics;  Laterality: Right;    POSTERIOR FUSION OF CERVICAL SPINE WITH LAMINECTOMY N/A 11/14/2022    Procedure: LAMINECTOMY, SPINE, CERVICAL, WITH POSTERIOR FUSION C3-C6;  Surgeon: Nicolette Cheek MD;  Location: [facility] 2ND FLR;  Service: Neurosurgery;  Laterality: N/A;  TORONTO III, ASA III, BLOOD TYPE AND SCREEN, NEUROMONITORING EMG SEP MEP, BRACE/HALO [place], BED REGULAR BED, HEADREST [place], POSITION PRONE, SPECIAL EQUIPMENT NIKI MIDDLE[place], RADIOLOGY C-ARM, EXT. BONE STIMULATOR BIOMET    REPLACEMENT OF PACEMAKER GENERATOR Left 10/7/2022    Procedure: REPLACEMENT, PACEMAKER GENERATOR;  Surgeon: John Sheets MD;  Location: [facility] EP LAB;  Service: Cardiology;  Laterality: Left;  YAS, SSS, AVB, Dual PPM Gen Change,SJM, MAC ,IA, 3 prep,** Adam  dcPPM in situ**    SURGICAL REMOVAL OF NASAL TURBINATE Bilateral 12/19/2019    Procedure: EXCISION, NASAL TURBINATE;  Surgeon: Martinez Flores III, MD;  Location: [facility];  Service: ENT;  Laterality: Bilateral;    TONSILLECTOMY      WISDOM TOOTH EXTRACTION       Current Outpatient Medications on File Prior to Visit   Medication Sig Dispense Refill    acetaminophen (TYLENOL) 500 MG tablet Take 2 tablets (1,000 mg total) by mouth 2 (two) times daily as needed for Pain. Begin post op day 3. 50 tablet 0    buPROPion (WELLBUTRIN XL) 300 MG 24 hr tablet Take 1 tablet by mouth once daily.      buPROPion (WELLBUTRIN XL) 300 MG 24 hr tablet Take 1 tablet by mouth once daily.      denosumab (PROLIA) 60 mg/mL Syrg 60 mL MONTHLY (route: subcutaneous)      FLUoxetine 60 mg Tab Take 1 tablet by mouth once daily.      lisdexamfetamine (VYVANSE) 70 MG capsule Take 70 mg by mouth every morning.      losartan (COZAAR) 25 MG tablet Take 1 tablet (25 mg total) by mouth once daily. 90 tablet 6    losartan (COZAAR) 25 MG tablet Take 1 tablet by mouth once daily.      melatonin (MELATIN) 5 mg Take 1 tablet by mouth every evening. (For insomnia)      methyl salicylate-menthol 15-10% 15-10 % Crea Apply topically every evening. 113 g 0    pravastatin (PRAVACHOL) 40 MG tablet Take 1 tablet (40 mg total) by mouth once daily. 90 tablet 1    pravastatin (PRAVACHOL) 40 MG tablet Take 1 tablet by mouth once daily.      PROLIA 60 mg/mL Syrg Inject 60 mg into the skin every 6 (six) months.      propranoloL (INDERAL) 20 MG tablet TAKE 1 TABLET TWICE DAILY 180 tablet 3    aspirin (ECOTRIN) 81 MG EC tablet Take 1 tablet (81 mg total) by mouth 2 (two) times a day. 60 tablet 0    ibuprofen (ADVIL,MOTRIN) 600 MG tablet Take 1 tablet (600 mg total) by mouth 3 (three) times daily as needed for Pain. Bedside Delivery (Patient not taking: Reported on 4/12/2024) 45 tablet 0     No current facility-administered medications on file prior to visit.     Review of  "patient's allergies indicates:  No Known Allergies    Review of Systems   Constitutional: Negative for chills, decreased appetite, fever, malaise/fatigue, night sweats, weight gain and weight loss.   Eyes:         Poor eyesight   Cardiovascular:  Negative for chest pain, claudication, dyspnea on exertion, leg swelling, palpitations and syncope.   Respiratory:  Negative for cough and shortness of breath.    Endocrine: Negative for cold intolerance and heat intolerance.   Hematologic/Lymphatic: Negative for bleeding problem. Does not bruise/bleed easily.   Skin:  Positive for nail changes. Negative for color change, dry skin, flushing, itching, poor wound healing, rash, skin cancer, suspicious lesions and unusual hair distribution.   Musculoskeletal:  Positive for arthritis, falls and stiffness. Negative for back pain, gout, joint pain, joint swelling, muscle cramps, muscle weakness, myalgias and neck pain.   Gastrointestinal:  Negative for diarrhea, nausea and vomiting.   Neurological:  Positive for numbness and paresthesias. Negative for dizziness, focal weakness, light-headedness, tremors, vertigo and weakness.   Psychiatric/Behavioral:  Negative for altered mental status and depression. The patient does not have insomnia.    Allergic/Immunologic: Negative.            Objective:       Vitals:    05/01/24 0821   BP: 123/61   Pulse: 60   Weight: 69.6 kg (153 lb 5.3 oz)   Height: 5' 8" (1.727 m)   PainSc:   6   PainLoc: Foot   69.6 kg (153 lb 5.3 oz) afeb    Physical Exam  Vitals reviewed.   Constitutional:       General: She is not in acute distress.     Appearance: She is well-developed. She is not ill-appearing, toxic-appearing or diaphoretic.      Comments: Proper supportive shoegear   Cardiovascular:      Pulses:           Dorsalis pedis pulses are 2+ on the right side and 2+ on the left side.        Posterior tibial pulses are 1+ on the right side and 1+ on the left side.      Comments: No edema to " digits  Musculoskeletal:         General: Deformity present.      Right lower leg: No edema.      Left lower leg: No edema.      Right foot: Decreased range of motion. Deformity and prominent metatarsal heads present. No tenderness or bony tenderness.      Left foot: Decreased range of motion. Deformity and prominent metatarsal heads present. No tenderness or bony tenderness.      Comments:    Semi- Reducible extensor and flexor contractures at the MTPJ and PIPJ of toes 2-5, bilat.       No pop      Feet:      Right foot:      Protective Sensation: 10 sites tested.  0 sites sensed.      Skin integrity: No ulcer, blister, skin breakdown, erythema, warmth, callus or dry skin.      Toenail Condition: Right toenails are abnormally thick. Fungal disease present.     Left foot:      Protective Sensation: 10 sites tested.  0 sites sensed.      Skin integrity: Ulcer and skin breakdown present. No blister, erythema, warmth, callus or dry skin.      Toenail Condition: Left toenails are abnormally thick. Fungal disease present.     Comments:  No soi      Pre-Ulcer location hemosiderin:  right, 2nd and 3rd toe subungal distal              Skin:     General: Skin is warm and dry.      Capillary Refill: Capillary refill takes 2 to 3 seconds.      Coloration: Skin is not pale.      Findings: No erythema or rash.      Nails: There is clubbing.      Comments:          Neurological:      Mental Status: She is alert and oriented to person, place, and time.      Sensory: Sensory deficit present.      Motor: Atrophy present.      Gait: Gait abnormal.   Psychiatric:         Attention and Perception: Attention normal.         Mood and Affect: Mood normal.         Speech: Speech normal.         Thought Content: Thought content normal.         Cognition and Memory: Cognition normal.         Judgment: Judgment normal.                    Assessment:       Encounter Diagnoses   Name Primary?    Pre-ulcerative calluses Yes    Sensory peripheral  neuropathy     Hammer toe, unspecified laterality     Right foot pain                          Plan:       Manisha was seen today for follow-up.    Diagnoses and all orders for this visit:    Pre-ulcerative calluses    Sensory peripheral neuropathy    Hammer toe, unspecified laterality    Right foot pain                      I counseled the patient on her conditions, their implications and medical management.         - Shoe inspection. Patient instructed on proper foot hygeine. We discussed wearing proper shoe gear, daily foot inspections, never walking without protective shoe gear, never putting sharp instruments to feet, routine podiatric nail visits every 2-3 months.           - With patient's permission, the toenails mentioned above were aggressively reduced and debrided using a nail nipper, removing all offending nail and debris. Utilizing a #15 scalpel, I trimmed the corns and calluses at the above mentioned location.      The patient will continue to monitor the areas daily, inspect the feet, wear protective shoe gear when ambulatory, and moisturizer to maintain skin integrity.        Recommend vasaline or aquaphor    Re-  recommend tenotomy for 2nd, 4th and 5th  With Dr. Ho  Pt will consider; currently worried about fall risk

## 2024-05-02 ENCOUNTER — PATIENT MESSAGE (OUTPATIENT)
Dept: ADMINISTRATIVE | Facility: OTHER | Age: 74
End: 2024-05-02
Payer: MEDICARE

## 2024-05-03 ENCOUNTER — OFFICE VISIT (OUTPATIENT)
Dept: INTERNAL MEDICINE | Facility: CLINIC | Age: 74
End: 2024-05-03
Payer: MEDICARE

## 2024-05-03 ENCOUNTER — CLINICAL SUPPORT (OUTPATIENT)
Dept: REHABILITATION | Facility: OTHER | Age: 74
End: 2024-05-03
Payer: MEDICARE

## 2024-05-03 ENCOUNTER — LAB VISIT (OUTPATIENT)
Dept: LAB | Facility: HOSPITAL | Age: 74
End: 2024-05-03
Attending: INTERNAL MEDICINE
Payer: MEDICARE

## 2024-05-03 VITALS
WEIGHT: 152.56 LBS | BODY MASS INDEX: 23.12 KG/M2 | HEART RATE: 70 BPM | HEIGHT: 68 IN | OXYGEN SATURATION: 99 % | DIASTOLIC BLOOD PRESSURE: 72 MMHG | SYSTOLIC BLOOD PRESSURE: 130 MMHG

## 2024-05-03 DIAGNOSIS — R68.89 DECREASED FUNCTIONAL ACTIVITY TOLERANCE: Primary | ICD-10-CM

## 2024-05-03 DIAGNOSIS — I49.5 SSS (SICK SINUS SYNDROME): Chronic | ICD-10-CM

## 2024-05-03 DIAGNOSIS — D64.9 ANEMIA, UNSPECIFIED TYPE: ICD-10-CM

## 2024-05-03 DIAGNOSIS — E04.2 GOITER, NONTOXIC, MULTINODULAR: ICD-10-CM

## 2024-05-03 DIAGNOSIS — R26.89 BALANCE PROBLEM: ICD-10-CM

## 2024-05-03 DIAGNOSIS — I10 ESSENTIAL HYPERTENSION: ICD-10-CM

## 2024-05-03 DIAGNOSIS — R29.898 DECREASED STRENGTH OF TRUNK AND BACK: ICD-10-CM

## 2024-05-03 DIAGNOSIS — I48.0 PAROXYSMAL ATRIAL FIBRILLATION: ICD-10-CM

## 2024-05-03 DIAGNOSIS — D89.89 LIGHT CHAIN DISEASE, KAPPA TYPE: ICD-10-CM

## 2024-05-03 DIAGNOSIS — M81.8 OTHER OSTEOPOROSIS WITHOUT CURRENT PATHOLOGICAL FRACTURE: ICD-10-CM

## 2024-05-03 DIAGNOSIS — E21.0 PRIMARY HYPERPARATHYROIDISM: ICD-10-CM

## 2024-05-03 DIAGNOSIS — F33.0 MILD EPISODE OF RECURRENT MAJOR DEPRESSIVE DISORDER: Primary | ICD-10-CM

## 2024-05-03 DIAGNOSIS — D69.6 THROMBOCYTOPENIA: ICD-10-CM

## 2024-05-03 DIAGNOSIS — G47.33 OSA (OBSTRUCTIVE SLEEP APNEA): ICD-10-CM

## 2024-05-03 DIAGNOSIS — N18.31 STAGE 3A CHRONIC KIDNEY DISEASE: ICD-10-CM

## 2024-05-03 LAB
BASOPHILS # BLD AUTO: 0.05 K/UL (ref 0–0.2)
BASOPHILS NFR BLD: 0.5 % (ref 0–1.9)
DIFFERENTIAL METHOD BLD: NORMAL
EOSINOPHIL # BLD AUTO: 0.3 K/UL (ref 0–0.5)
EOSINOPHIL NFR BLD: 2.7 % (ref 0–8)
ERYTHROCYTE [DISTWIDTH] IN BLOOD BY AUTOMATED COUNT: 13.1 % (ref 11.5–14.5)
FERRITIN SERPL-MCNC: 29 NG/ML (ref 20–300)
HCT VFR BLD AUTO: 39.6 % (ref 37–48.5)
HGB BLD-MCNC: 12.8 G/DL (ref 12–16)
IMM GRANULOCYTES # BLD AUTO: 0.03 K/UL (ref 0–0.04)
IMM GRANULOCYTES NFR BLD AUTO: 0.3 % (ref 0–0.5)
IRON SERPL-MCNC: 53 UG/DL (ref 30–160)
LYMPHOCYTES # BLD AUTO: 2.8 K/UL (ref 1–4.8)
LYMPHOCYTES NFR BLD: 28.6 % (ref 18–48)
MCH RBC QN AUTO: 30.1 PG (ref 27–31)
MCHC RBC AUTO-ENTMCNC: 32.3 G/DL (ref 32–36)
MCV RBC AUTO: 93 FL (ref 82–98)
MONOCYTES # BLD AUTO: 1 K/UL (ref 0.3–1)
MONOCYTES NFR BLD: 10.3 % (ref 4–15)
NEUTROPHILS # BLD AUTO: 5.7 K/UL (ref 1.8–7.7)
NEUTROPHILS NFR BLD: 57.6 % (ref 38–73)
NRBC BLD-RTO: 0 /100 WBC
PLATELET # BLD AUTO: 195 K/UL (ref 150–450)
PMV BLD AUTO: 11.7 FL (ref 9.2–12.9)
RBC # BLD AUTO: 4.25 M/UL (ref 4–5.4)
SATURATED IRON: 12 % (ref 20–50)
TOTAL IRON BINDING CAPACITY: 453 UG/DL (ref 250–450)
TRANSFERRIN SERPL-MCNC: 306 MG/DL (ref 200–375)
WBC # BLD AUTO: 9.88 K/UL (ref 3.9–12.7)

## 2024-05-03 PROCEDURE — 1157F ADVNC CARE PLAN IN RCRD: CPT | Mod: HCNC,CPTII,S$GLB, | Performed by: INTERNAL MEDICINE

## 2024-05-03 PROCEDURE — 97112 NEUROMUSCULAR REEDUCATION: CPT | Mod: HCNC,CQ

## 2024-05-03 PROCEDURE — 83540 ASSAY OF IRON: CPT | Mod: HCNC | Performed by: INTERNAL MEDICINE

## 2024-05-03 PROCEDURE — 82728 ASSAY OF FERRITIN: CPT | Mod: HCNC | Performed by: INTERNAL MEDICINE

## 2024-05-03 PROCEDURE — 99999 PR PBB SHADOW E&M-EST. PATIENT-LVL V: CPT | Mod: PBBFAC,HCNC,, | Performed by: INTERNAL MEDICINE

## 2024-05-03 PROCEDURE — 3288F FALL RISK ASSESSMENT DOCD: CPT | Mod: HCNC,CPTII,S$GLB, | Performed by: INTERNAL MEDICINE

## 2024-05-03 PROCEDURE — 3075F SYST BP GE 130 - 139MM HG: CPT | Mod: HCNC,CPTII,S$GLB, | Performed by: INTERNAL MEDICINE

## 2024-05-03 PROCEDURE — 3078F DIAST BP <80 MM HG: CPT | Mod: HCNC,CPTII,S$GLB, | Performed by: INTERNAL MEDICINE

## 2024-05-03 PROCEDURE — 36415 COLL VENOUS BLD VENIPUNCTURE: CPT | Mod: HCNC,PO | Performed by: INTERNAL MEDICINE

## 2024-05-03 PROCEDURE — 1100F PTFALLS ASSESS-DOCD GE2>/YR: CPT | Mod: HCNC,CPTII,S$GLB, | Performed by: INTERNAL MEDICINE

## 2024-05-03 PROCEDURE — 1125F AMNT PAIN NOTED PAIN PRSNT: CPT | Mod: HCNC,CPTII,S$GLB, | Performed by: INTERNAL MEDICINE

## 2024-05-03 PROCEDURE — 99214 OFFICE O/P EST MOD 30 MIN: CPT | Mod: HCNC,S$GLB,, | Performed by: INTERNAL MEDICINE

## 2024-05-03 PROCEDURE — 97110 THERAPEUTIC EXERCISES: CPT | Mod: HCNC,CQ

## 2024-05-03 PROCEDURE — 85025 COMPLETE CBC W/AUTO DIFF WBC: CPT | Mod: HCNC | Performed by: INTERNAL MEDICINE

## 2024-05-03 RX ORDER — MELATONIN 5 MG
CAPSULE ORAL
Start: 2024-05-03

## 2024-05-03 RX ORDER — GABAPENTIN 100 MG/1
CAPSULE ORAL
Start: 2024-05-03

## 2024-05-03 RX ORDER — METHOCARBAMOL 500 MG/1
TABLET, FILM COATED ORAL
Qty: 40 TABLET | Refills: 0 | Status: SHIPPED | OUTPATIENT
Start: 2024-05-03

## 2024-05-03 NOTE — PROGRESS NOTES
[facility]  Physical Therapy Treatment Note     Name: Manisha Wynne  Clinic Number: [id number]    Therapy Diagnosis:   Encounter Diagnoses   Name Primary?    Decreased functional activity tolerance Yes    Decreased strength of trunk and back     Balance problem        Physician: Nicolette Cheek MD    Visit Date: 5/3/2024    Physician Orders: PT Eval and Treat  Medical Diagnosis from Referral:   1. Decreased strength of trunk and back    2. Back pain, unspecified back location, unspecified back pain laterality, unspecified chronicity    3. Balance problem    4. Decreased functional activity tolerance       Evaluation Date: 4/24/2024  Authorization Period Expiration: 2/21/2025  Plan of Care Expiration: 7/24/2024  Reassessment Due: 5/24/2024  Visit # / Visits authorized: 3/20     Time In: 10:32 AM  Time Out: 1025 AM  Total Billable Time: 43 minutes  INSURANCE and OUTCOMES: Fee for Service with FOTO Outcomes 1/3     Precautions: standard, fall, R elbow fracture     Pattern of pain determined: 1 Movement responder  MedX Testing:MedX testing visit 1      Subjective     Manisha Wynne reports her low back is doing fine. Her upper back is bothering her. She feels like she may have slept wrong. She has started therapy for her R elbow fracture.     Patient reports tolerating previous visit well.   Patient reports their pain to be 5/10 on a 0-10 scale with 0 being no pain and 10 being the worst pain imaginable.  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  Leisure: used to walk with a friend,       Prior Level of Function: I with increased time  Current Level of Function: Difficulty with cleaning  DME owned/used: Cane, shower chair, hand held shower, grab bars  Gym Membership: No    Pt goals: Pts goals: Get healthy and get stronger      Objective      Lumbar  Isometric Testing on Med X equipment: Testing administered by PT    Test Initial Baseline Midpoint Final   Date 4/24/2024     ROM 6-36 deg     Max Peak Torque 80      Min Peak Torque 32      Flex/Ext Ratio 2.5     % variance  normative data 38     % change from initial test N/A visit 1         Outcomes:  Intake Score: 24%  Visit 6 Score:   Visit 10 Score:   Discharge Score:  Goal Score: 15%     Treatment     Manisha received the treatments listed below:      Medical MedX Treatment as follows:  MedX exercise started day 2:  Patient received neuromuscular education for 10 minutes via participation on the Medical MedX Machine. Therapist assisted patient in isolating and engaging spinal stabilization musculature in order to improve functional ability and postural control. Patient performed exercise with therapist guidance in order to accurately use pacer function, avoid valsalva, and optimally exert effort within a safe and effective range via the Ana Exertion Rating Scale. Patient instructed to perform at a midrange of exertion and to complete 15-20 repetitions within appropriate split time, with proper technique, and while maintaining safety.          5/3/2024     9:00 AM   [facility]   Visit Number 3   VAS Pain Rating 5   Extension in Standing 20   Flexion in Lying 10   Lumbar Weight 40 lbs   Repetitions 18   Rating of Perceived Exertion 3   Ice - Z Lie (in min.) 5           Manisha participated in neuromuscular re-education activities to improve balance, coordination, proprioception, motor control and/or posture for 00 minutes. The following activities were included:      Manisha participated in therapeutic exercises to develop strength, endurance, ROM, flexibility, posture, and core stabilization for 43 minutes including:    LTR x10  SKTC x10  PPT x10  Bridges x10  Figure 4 stretch x1' ea- np  EIS 2 x10    Peripheral muscle strengthening which included one set of 15-20  repetitions at a slow and controlled 10-13 second per rep pace focused on strengthening supporting musculature in order to improve body mechanics and functional mobility. Patient and therapist focused on proper form during treatment to ensure optimal strengthening of each targeted muscle group.  Machines utilized included:Torso rotation, Leg Ext, Leg Curl, and Leg Press  To be added next visit:Rowing, Triceps, Biceps, and Hip Abd      Manisha participated in dynamic functional therapeutic activities to improve functional performance and simulate household and community activities for 00  minutes. The following activities were included:    Manisha received manual therapy techniques for 00  minutes. The following activities were included:      Pt given cold pack for 5 minutes to low back in z-lying.    Patient Education and Home Exercises     Home exercises include:  LTR x10  SKTC x10  PPT x10  Bridges x10  EIS x10    Cardio program (V5): -  Lifting education (V11): -  Posture/Lumbar roll:   Fridge Magnet Discharge handout (date given): -  Equipment at home/gym membership:     Education provided:   - PT role and POC  - HEP review  - MedX and peripheral strengthening; purpose and form    Written Home Exercises Provided: yes.  Exercises were reviewed and Manisha was able to demonstrate them prior to the end of the session.  Manisha demonstrated good  understanding of the education provided.     See EMR under Patient Instructions for exercises provided prior visit.    Assessment     Manisha presents to session with moderate levels of pain. Continued emphasis on HEP performance. Pt able to perform HEP with min verbal cues indicating adherence at home. Pt was able to maintain previous resistance with 18 reps completed at 3/10 RPE. Lower extremity peripherals only were utilized today due to pts elbow fracture. Consider modifying UE strengthening in future visits to accommodate injury.         Patient is making good progress  towards established goals.  Pt will continue to benefit from skilled outpatient physical therapy to address the deficits stated in the impairment chart, provide pt/family education and to maximize pt's level of independence in the home and community environment.     Anticipated Barriers for therapy: none  Pt's spiritual, cultural and educational needs considered and pt agreeable to plan of care and goals as stated below:     GOALS: Pt is in agreement with the following goals.     Short term goals:  6 weeks or 10 visits   - Pt will demonstrate increased lumbar ROM by at least 6 degrees from the initial ROM value with improvements noted in functional ROM and ability to perform ADLs. Appropriate and Ongoing  - Pt will demonstrate increased MedX average isometric strength value by 15% from initial test resulting in improved ability to perform bending, lifting, and carrying activities safely, confidently. Appropriate and Ongoing  - Pt will report a reduction in worst pain score by 1-2 points for improved tolerance for transfers sit to stand. Appropriate and Ongoing  - Pt able to perform HEP correctly with minimal cueing or supervision from therapist to encourage independent management of symptoms. Appropriate and Ongoing     Long term goals: 10 weeks or 20 visits   - Pt will demonstrate increased lumbar ROM by at least 12 degrees from initial ROM value, resulting in improved ability to perform functional forward bending while standing and sitting. Appropriate and Ongoing  - Pt will demonstrate increased MedX average isometric strength value by 25% from initial test resulting in improved ability to perform bending, lifting, and carrying activities safely and confidently. Appropriate and Ongoing  - Pt to demonstrate ability to independently control and reduce their pain through posture positioning and mechanical movements throughout a typical day. Appropriate and Ongoing  - Pt will demonstrate reduced pain and improved  functional outcomes as reported on the FOTO by reaching a limitation score of < or = 15% or less in order to demonstrate subjective improvement in pt's condition.   Appropriate and Ongoing  - Pt will demonstrate independence with the HEP at discharge. Appropriate and Ongoing  - Pt will be able to complete safe bending and lifting up to 10# without increased low back pain (patient goal) Appropriate and Ongoing    Plan     Continue with established Plan of Care towards established PT goals.     Therapist: Deandre Neil, PTA  5/3/2024

## 2024-05-03 NOTE — PROGRESS NOTES
Subjective:       Patient ID: Manisha Wynne is a 73 y.o. female.    Chief Complaint: Follow-up    Follow-up  Pertinent negatives include no abdominal pain, chest pain (arm pain or jaw pain), headaches, nausea or vomiting.    SHe had another fall recently.  SHe is feeling ok today.  No CP or SOB.  Review of Systems   Respiratory:  Negative for shortness of breath (PND or orthopnea).    Cardiovascular:  Negative for chest pain (arm pain or jaw pain).   Gastrointestinal:  Negative for abdominal pain, diarrhea, nausea and vomiting.   Genitourinary:  Negative for dysuria.   Neurological:  Negative for seizures, syncope and headaches.       Objective:      Physical Exam  Constitutional:       General: She is not in acute distress.     Appearance: She is well-developed.   HENT:      Head: Normocephalic.   Eyes:      Pupils: Pupils are equal, round, and reactive to light.   Neck:      Thyroid: No thyromegaly.      Vascular: No JVD.   Cardiovascular:      Rate and Rhythm: Normal rate and regular rhythm.      Heart sounds: Normal heart sounds. No murmur heard.     No friction rub. No gallop.   Pulmonary:      Effort: Pulmonary effort is normal.      Breath sounds: Normal breath sounds. No wheezing or rales.   Abdominal:      General: Bowel sounds are normal. There is no distension.      Palpations: Abdomen is soft. There is no mass.      Tenderness: There is no abdominal tenderness. There is no guarding or rebound.   Musculoskeletal:      Cervical back: Neck supple.   Lymphadenopathy:      Cervical: No cervical adenopathy.   Skin:     General: Skin is warm and dry.   Neurological:      Mental Status: She is alert and oriented to person, place, and time.      Deep Tendon Reflexes: Reflexes are normal and symmetric.   Psychiatric:         Behavior: Behavior normal.         Thought Content: Thought content normal.         Judgment: Judgment normal.         Assessment:       1. Mild episode of recurrent major depressive  disorder    2. Essential hypertension    3. Paroxysmal atrial fibrillation    4. SSS (sick sinus syndrome)    5. Goiter, nontoxic, multinodular    6. CEDRIC (obstructive sleep apnea)    7. Primary hyperparathyroidism    8. Light chain disease, kappa type    9. Thrombocytopenia    10. Stage 3a chronic kidney disease    11. Other osteoporosis without current pathological fracture    12. Anemia, unspecified type        Plan:   Mild episode of recurrent major depressive disorder  stable  COntinue current meds  Essential hypertension  Controlled - continue current meds    Paroxysmal atrial fibrillation  Not on anticoagulation as very high risk with her hx of falls  SSS (sick sinus syndrome)  Has pacer  Goiter, nontoxic, multinodular  Very small cysts on last U/S no need for follow up  CEDRIC (obstructive sleep apnea)  Patient is using faithfully and derives great benefit from it   Primary hyperparathyroidism  Per endocrinology  Light chain disease, kappa type  Per hematology - pt should see them yearly July 2024  Thrombocytopenia  Stable and monitored  Stage 3a chronic kidney disease  Stable and monitored  Other osteoporosis without current pathological fracture  -     DXA Bone Density Axial Skeleton 1 or more sites; Future; Expected date: 08/05/2024  -     Ambulatory referral/consult to Endocrinology; Future; Expected date: 05/10/2024    Anemia, unspecified type  -     CBC Auto Differential; Future; Expected date: 05/03/2024  -     Iron and TIBC; Future; Expected date: 05/03/2024  -     Ferritin; Future; Expected date: 05/03/2024    Other orders  -     gabapentin (NEURONTIN) 100 MG capsule; Two at bedtime - weaning off  -     methocarbamoL (ROBAXIN) 500 MG Tab; One - two daily as needed for back pain  Dispense: 40 tablet; Refill: 0  -     melatonin 5 mg Cap; One at bedtime

## 2024-05-06 ENCOUNTER — DOCUMENT SCAN (OUTPATIENT)
Dept: HOME HEALTH SERVICES | Facility: HOSPITAL | Age: 74
End: 2024-05-06
Payer: MEDICARE

## 2024-05-06 ENCOUNTER — CLINICAL SUPPORT (OUTPATIENT)
Dept: REHABILITATION | Facility: HOSPITAL | Age: 74
End: 2024-05-06
Payer: MEDICARE

## 2024-05-06 DIAGNOSIS — M25.521 ELBOW PAIN, RIGHT: Primary | ICD-10-CM

## 2024-05-06 PROCEDURE — 97530 THERAPEUTIC ACTIVITIES: CPT | Mod: HCNC

## 2024-05-06 PROCEDURE — 97140 MANUAL THERAPY 1/> REGIONS: CPT | Mod: HCNC

## 2024-05-06 PROCEDURE — 97018 PARAFFIN BATH THERAPY: CPT | Mod: HCNC

## 2024-05-06 NOTE — PROGRESS NOTES
[facility] OUTPATIENT THERAPY AND WELLNESS  Occupational Therapy Treatment Note    Date: 5/6/2024  Name: Manisha Wynne  Clinic Number: [id number]    Therapy Diagnosis:        Encounter Diagnoses   Name Primary?    Post-operative state      Closed fracture of olecranon process of right ulna, initial encounter      Elbow pain, right Yes    Range of motion deficit           Medical Diagnosis: R Olecranon fracture with ORIF 2/28/24  ICD-10:   Z98.890 (ICD-10-CM) - Post-operative state   S52.021A (ICD-10-CM) - Closed fracture of olecranon process of right ulna, initial encounter         Physician: Sarmad Varghese PA-C; Dr. DESIREE Guevara   Physician Orders: Eval and Treat  R Elbow Olecranon ORIF  DOS: 2/28/24  12 Visits     Surgical Procedure and Date: 2/28/24,   Procedure(s) (LRB):  ORIF, ELBOW (Right)        Evaluation Date: 4/23/2024     Plan of Care Certification Period: /23/24     Date of Return to MD: 5/10/24     Visit # / Visits authorized: 2/ 12 (20 total)   Insurance Authorization Period Expiration: 12/31/24     FOTO #1: FSM 47% / GOAL 65%  FOTO #2:   FOTO #3:      Precautions:  Standard and Weightbearing     Time In:215  Time Out: 330  Total Appointment Time (timed & untimed codes): 45 minutes    SUBJECTIVE     Pt reports: I'm doing healthy back now too, but we got that scheduled, I let the shower run on elbow and ice sometimes at end of the day.   She was compliant with home exercise program given last session.   Response to previous treatment:supination improved   Functional change: none     Pain: 5/10  Location: right elbow throbs, sore stiff      OBJECTIVE     Objective Measures updated at progress report unless specified.    Edema. Measured in centimeters.    4/23/2024         Proximal Wrist Crease 15.8 cm   Proximal Palmar Crease      MCPs           UE  ROM. Measured in degrees.    4/23/2024         Wrist ext/flex  30 / 45    Sup/pro  70 / 70b         Elbow ext / flex  52/122               Sensation-  intact      Special Tests: NT          Strength (Dyanmometer) and Pinch Strength (Pinch Gauge)  Measured in pounds and psi. Average of three trials.    4/23/2024 4/23/2024     Right  Left    Rung II TBA TBA   Key Pinch       3pt Pinch       2pt Pinch         Treatment     Manisha received the treatments listed below:     Supervised Modalities after being cleared for contradictions: 10 min MH to R elbow and paraffin to R hand x 10 min to increase blood flow, circulation and tissue elasticity prior to therex.      Therapeutic Activities to improve functional performance for 25  minutes, including:  - elbow stretch over towel roll with 1# weight x 20 sec hold x 10 reps.   - wrist flex, ext, RD, UD, sup/pron   - elbow flex, extension over towel roll in 3 positions, palm up, thumb up, palm down x 10 reps each  - AAROM with small dowel for chest press x 10, elbow flex/ext x 10 reps   - supination wheel for sup/pron x 10 reps each   - juxaticiser x 2 min   - reviewed use MH applications to decrease stiffness before exercises   - CP as needed for pain,swelling at night   - compression sleeve in place of ace wrap provided size D x 2 for day/night use.        Manual therapy techniques: for 10 min (including Joint mobilizations, Myofacial release, Manual Lymphatic Drainage, Soft tissue Mobilization, and Friction Massage)  were applied to the R elbow  as follows:   - debrided scabs on elbow incision.   - scar massage to R elbow to decrease adhesions and improve tensile glide   - DTM to biceps and anti cubital fossa to decrease muscle tightness and guarding to facilitate elbow extension.       Patient Education and Home Exercises      Education provided:   - Cont HEP   - Progress towards goals     Written Home Exercises Provided: Patient instructed to cont prior HEP.  Exercises were reviewed and Manisha was able to demonstrate them prior to the end of the session.  Manisha demonstrated good  understanding of the HEP  provided. See EMR under Patient Instructions for exercises provided during therapy sessions.       Assessment     Manisha is progressing well towards her goals and there are no updates to goals at this time. Pt prognosis is Good.     Pt will continue to benefit from skilled outpatient occupational therapy to address the deficits listed in the problem list on initial evaluation provide pt/family education and to maximize pt's level of independence in the home and community environment.     Pt's spiritual, cultural and educational needs considered and pt agreeable to plan of care and goals.    Anticipated barriers to occupational therapy: none     The following goals were discussed with the patient and patient is in agreement with them as to be addressed in the treatment plan.      Short Term Goals: (6 weeks):  1)  Patient to be IND with HEP and modalities for pain management  2)  Increase ROM 10-20 degrees to increase functional hand use for ADLs/work/leisure activities  3)   Decrease edema .1-.5mm to increase joint mobility /flexibility for functional hand use.   4)  Assess   and pinch and set goals accordingly      Long Term Goals :To be met by discharge (12 weeks)   1)  Increase  strength 2-8 lbs. For driving, cooking   2) Increase pinch strength 1-4 psi's for opening bottles and FM coordination   3)   Increase ROM 21-40 degrees to increase functional hand use for ADLs/work/leisure activities  4) Patient to score at _60_% OR MORE (FSM)  on FOTO to demonstrate improved perception of functional R hand  Use.     Plan   Certification Period/Plan of care expiration: 4/23/2024 to 7/23/24.    Desi Medellin, OT  , CHT

## 2024-05-09 ENCOUNTER — TELEPHONE (OUTPATIENT)
Dept: ORTHOPEDICS | Facility: CLINIC | Age: 74
End: 2024-05-09
Payer: MEDICARE

## 2024-05-09 NOTE — TELEPHONE ENCOUNTER
Spoke to pt and reminded her of her appointmenT AND XR  Pt voiced understanding and call ended.

## 2024-05-10 ENCOUNTER — CLINICAL SUPPORT (OUTPATIENT)
Dept: REHABILITATION | Facility: HOSPITAL | Age: 74
End: 2024-05-10
Payer: MEDICARE

## 2024-05-10 ENCOUNTER — HOSPITAL ENCOUNTER (OUTPATIENT)
Dept: RADIOLOGY | Facility: OTHER | Age: 74
Discharge: HOME | End: 2024-05-10
Attending: SPECIALIST/TECHNOLOGIST
Payer: MEDICARE

## 2024-05-10 ENCOUNTER — OFFICE VISIT (OUTPATIENT)
Dept: ORTHOPEDICS | Facility: CLINIC | Age: 74
End: 2024-05-10
Payer: MEDICARE

## 2024-05-10 ENCOUNTER — OUTPATIENT CASE MANAGEMENT (OUTPATIENT)
Dept: ADMINISTRATIVE | Facility: OTHER | Age: 74
End: 2024-05-10
Payer: MEDICARE

## 2024-05-10 VITALS — BODY MASS INDEX: 23.12 KG/M2 | HEIGHT: 68 IN | WEIGHT: 152.56 LBS

## 2024-05-10 DIAGNOSIS — R52 PAIN: ICD-10-CM

## 2024-05-10 DIAGNOSIS — Z98.890 POST-OPERATIVE STATE: Primary | ICD-10-CM

## 2024-05-10 DIAGNOSIS — R52 PAIN: Primary | ICD-10-CM

## 2024-05-10 DIAGNOSIS — S52.021D CLOSED FRACTURE OF OLECRANON PROCESS OF RIGHT ULNA WITH ROUTINE HEALING, SUBSEQUENT ENCOUNTER: ICD-10-CM

## 2024-05-10 DIAGNOSIS — M25.521 ELBOW PAIN, RIGHT: Primary | ICD-10-CM

## 2024-05-10 PROCEDURE — 97018 PARAFFIN BATH THERAPY: CPT | Mod: HCNC,59

## 2024-05-10 PROCEDURE — 73080 X-RAY EXAM OF ELBOW: CPT | Mod: 26,HCNC,RT, | Performed by: RADIOLOGY

## 2024-05-10 PROCEDURE — 73080 X-RAY EXAM OF ELBOW: CPT | Mod: TC,HCNC,FY,RT

## 2024-05-10 PROCEDURE — 1159F MED LIST DOCD IN RCRD: CPT | Mod: HCNC,CPTII,S$GLB, | Performed by: SPECIALIST/TECHNOLOGIST

## 2024-05-10 PROCEDURE — 97140 MANUAL THERAPY 1/> REGIONS: CPT | Mod: HCNC

## 2024-05-10 PROCEDURE — 1157F ADVNC CARE PLAN IN RCRD: CPT | Mod: HCNC,CPTII,S$GLB, | Performed by: SPECIALIST/TECHNOLOGIST

## 2024-05-10 PROCEDURE — 97530 THERAPEUTIC ACTIVITIES: CPT | Mod: HCNC

## 2024-05-10 PROCEDURE — 99999 PR PBB SHADOW E&M-EST. PATIENT-LVL III: CPT | Mod: PBBFAC,HCNC,, | Performed by: SPECIALIST/TECHNOLOGIST

## 2024-05-10 PROCEDURE — 99024 POSTOP FOLLOW-UP VISIT: CPT | Mod: HCNC,S$GLB,, | Performed by: SPECIALIST/TECHNOLOGIST

## 2024-05-10 NOTE — PROGRESS NOTES
5/10/2024  1st attempt to complete Follow-Up  for Outpatient Care Management, left message.  Will send portal message with unable to assess letter.

## 2024-05-10 NOTE — PROGRESS NOTES
[facility]  Occupational Therapy Treatment Note    Date: 5/10/2024  Name: Manisha Wynne  Clinic Number: [id number]    Therapy Diagnosis:        Encounter Diagnoses   Name Primary?    Post-operative state      Closed fracture of olecranon process of right ulna, initial encounter      Elbow pain, right Yes    Range of motion deficit           Medical Diagnosis: R Olecranon fracture with ORIF 2/28/24  ICD-10:   Z98.890 (ICD-10-CM) - Post-operative state   S52.021A (ICD-10-CM) - Closed fracture of olecranon process of right ulna, initial encounter         Physician: Sarmad Varghese PA-C; Dr. DESIREE Guevara   Physician Orders: Eval and Treat  R Elbow Olecranon ORIF  DOS: 2/28/24  12 Visits     Surgical Procedure and Date: 2/28/24,   Procedure(s) (LRB):  ORIF, ELBOW (Right)        Evaluation Date: 4/23/2024     Plan of Care Certification Period: /23/24     Date of Return to MD: 5/10/24     Visit # / Visits authorized: 3/ 12 (20 total)   Insurance Authorization Period Expiration: 12/31/24     FOTO #1: FSM 47% / GOAL 65%  FOTO #2:   FOTO #3:      Precautions:  Standard and Weightbearing     Time In: 215 pm  Time Out: 330  Total Appointment Time (timed & untimed codes): 45 minutes    SUBJECTIVE     Pt reports: I ran hot water over my elbow before work and that helped a lot. I have a 5 or 6/10 pain I think I over did it at work.   She was compliant with home exercise program given last session.   Response to previous treatment:supination improved   Functional change: none     Pain: 5/10  Location: right elbow throbs, sore stiff      OBJECTIVE     Objective Measures updated at progress report unless specified.    Edema. Measured in centimeters.    4/23/2024         Proximal Wrist Crease 15.8 cm   Proximal Palmar Crease      MCPs           UE  ROM. Measured in degrees.    4/23/2024         Wrist ext/flex  30 / 45    Sup/pro  70 / 70b         Elbow ext / flex  52/122               Sensation-  intact      Special Tests: NT          Strength (Dyanmometer) and Pinch Strength (Pinch Gauge)  Measured in pounds and psi. Average of three trials.    4/23/2024 4/23/2024     Right  Left    Rung II TBA TBA   Key Pinch       3pt Pinch       2pt Pinch         Treatment     Manisha received the treatments listed below:     Supervised Modalities after being cleared for contradictions: 10 min MH and paraffin to to R elbow x 10 min to increase blood flow, circulation and tissue elasticity prior to therex.      Therapeutic Activities to improve functional performance for 25  minutes, including:  - elbow stretch over towel roll with 1# weight x 20 sec hold x 5 reps.   - wrist flex, ext, RD, UD, sup/pron   - elbow flex, extension over towel roll in 3 positions, palm up, thumb up, palm down x 10 reps each  - AAROM with small dowel for chest press x 10, elbow flex/ext x 10 reps   - juxaticiser x 2 min   -- CP as needed for pain,swelling at night   - compression sleeve in place of ace wrap provided size D x 2 for day/night use.        Manual therapy techniques: for 10 min (including Joint mobilizations, Myofacial release, Manual Lymphatic Drainage, Soft tissue Mobilization, and Friction Massage)  were applied to the R elbow  as follows:   - scar massage to R elbow to decrease adhesions and improve tensile glide   - DTM to biceps and anti cubital fossa to decrease muscle tightness and guarding to facilitate elbow extension.       Patient Education and Home Exercises      Education provided:   - Cont HEP   - Progress towards goals     Written Home Exercises Provided: Patient instructed to cont prior HEP.  Exercises were reviewed and Manisha was able to demonstrate them prior to the end of the session.  Manisha demonstrated good  understanding of the HEP provided. See EMR under Patient Instructions for exercises provided during therapy sessions.       Assessment     Manisha tolerated session well with rest breaks as needed.  Elbow extension remains limited. Manisha is progressing well towards her goals and there are no updates to goals at this time. Pt prognosis is Good.     Pt will continue to benefit from skilled outpatient occupational therapy to address the deficits listed in the problem list on initial evaluation provide pt/family education and to maximize pt's level of independence in the home and community environment.     Pt's spiritual, cultural and educational needs considered and pt agreeable to plan of care and goals.    Anticipated barriers to occupational therapy: none     The following goals were discussed with the patient and patient is in agreement with them as to be addressed in the treatment plan.      Short Term Goals: (6 weeks):  1)  Patient to be IND with HEP and modalities for pain management  2)  Increase ROM 10-20 degrees to increase functional hand use for ADLs/work/leisure activities  3)   Decrease edema .1-.5mm to increase joint mobility /flexibility for functional hand use.   4)  Assess   and pinch and set goals accordingly      Long Term Goals :To be met by discharge (12 weeks)   1)  Increase  strength 2-8 lbs. For driving, cooking   2) Increase pinch strength 1-4 psi's for opening bottles and FM coordination   3)   Increase ROM 21-40 degrees to increase functional hand use for ADLs/work/leisure activities  4) Patient to score at _60_% OR MORE (FSM)  on FOTO to demonstrate improved perception of functional R hand  Use.     Plan   Certification Period/Plan of care expiration: 4/23/2024 to 7/23/24.    Desi Medellin, OT  , CHT

## 2024-05-10 NOTE — PROGRESS NOTES
"5/10/24  Patient reports 12 weeks status post right olecranon fracture ORIF.  She has been attending therapy regularly.  She states that she has some pain still localized at the triceps insertion.  She notes that she still has difficulty with full extension at the elbow.  She does note that she has been doing more things and trying to use her right arm more.    4/11/24  Patient reports 6 weeks status post right olecranon fracture ORIF.  She reports she is in minimal pain.  She has been in a long-arm fiberglass cast for the past 4 weeks.  She denies any numbness or tingling.    3/14/24  Ms. Wynne is here today for a post-operative visit.  She is 14 days status post R Olecranon fracture ORIF by Dr. Moe on 2/28/24. She reports that she is we will pain.  She states she has been taking her pain medication only as needed.  She denies any numbness or tingling.  She denies fever, chills, and sweats since the time of the surgery.     Physical exam:    Vitals:    05/10/24 1428   Weight: 69.2 kg (152 lb 8.9 oz)   Height: 5' 8" (1.727 m)       Vital signs are stable, patient is afebrile.  Patient is well dressed and well groomed, no acute distress.  Alert and oriented to person, place, and time.  Cast removed and x-rays examined. Incision is clean, dry and intact.  There is no erythema or exudate.  There is no sign of any infection. She is NVI. Able to make a composite fist. Stiffness noted in the elbow. Lacks 40 degrees of extension and can flex to 120 degrees.     Assessment:  s/p R Olecranon fracture ORIF      Plan:  Manisha was seen today for follow-up.    Diagnoses and all orders for this visit:    Post-operative state    Closed fracture of olecranon process of right ulna with routine healing, subsequent encounter      Continue with regular therapy and home exercise program   Reviewed x-rays with the patient   Discussed potential hardware removal if continues to bother  Patient we will follow up in 6 weeks with " repeat right elbow x-rays.

## 2024-05-10 NOTE — LETTER
Manisha Wynne  [address]  [place] [address]      Dear Manisha Wynne,     I am your nurse with [facility]s Out[facility] Department. I was unsuccessful in reaching you today. At your earliest convenience, I would like to discuss your healthcare progress.      Please contact me at [phone] from 8:00AM to 4:30 PM on Monday thru Friday.     As you know, [facility] is a program offered to you through [facility] where a nurse is available 24/7 to answer questions or provide medical advice, their number is [phone].    Thanks,      Tamara Lopez, RN  Outpatient Care Management

## 2024-05-13 ENCOUNTER — CLINICAL SUPPORT (OUTPATIENT)
Dept: REHABILITATION | Facility: OTHER | Age: 74
End: 2024-05-13
Payer: MEDICARE

## 2024-05-13 ENCOUNTER — CLINICAL SUPPORT (OUTPATIENT)
Dept: REHABILITATION | Facility: HOSPITAL | Age: 74
End: 2024-05-13
Payer: MEDICARE

## 2024-05-13 DIAGNOSIS — R26.89 BALANCE PROBLEM: ICD-10-CM

## 2024-05-13 DIAGNOSIS — M25.521 ELBOW PAIN, RIGHT: Primary | ICD-10-CM

## 2024-05-13 DIAGNOSIS — R68.89 DECREASED FUNCTIONAL ACTIVITY TOLERANCE: Primary | ICD-10-CM

## 2024-05-13 DIAGNOSIS — R29.898 DECREASED STRENGTH OF TRUNK AND BACK: ICD-10-CM

## 2024-05-13 PROCEDURE — 97530 THERAPEUTIC ACTIVITIES: CPT | Mod: HCNC

## 2024-05-13 PROCEDURE — 97140 MANUAL THERAPY 1/> REGIONS: CPT | Mod: HCNC

## 2024-05-13 NOTE — PROGRESS NOTES
[facility]  Occupational Therapy Treatment Note    Date: 5/13/2024  Name: Manisha Wynne  Clinic Number: [id number]    Therapy Diagnosis:   1. Elbow pain, right           Medical Diagnosis: R Olecranon fracture with ORIF 2/28/24  ICD-10:   Z98.890 (ICD-10-CM) - Post-operative state   S52.021A (ICD-10-CM) - Closed fracture of olecranon process of right ulna, initial encounter         Physician: Sarmad Varghese PA-C; Dr. DESIREE Guevara   Physician Orders: Eval and Treat  R Elbow Olecranon ORIF  DOS: 2/28/24  12 Visits     Surgical Procedure and Date: 2/28/24,   Procedure(s) (LRB):  ORIF, ELBOW (Right)        Evaluation Date: 4/23/2024     Plan of Care Certification Period: /23/24     Date of Return to MD: 5/10/24     Visit # / Visits authorized: 4 / 12 (20 total)   Insurance Authorization Period Expiration: 12/31/24     FOTO #1: FSM 47% / GOAL 65%  FOTO #2:   FOTO #3:      Precautions:  Standard and Weightbearing     Time In: 1:45 pm  Time Out: 2:30   Total Appointment Time (timed & untimed codes): 45 minutes    SUBJECTIVE     Pt reports: Im actually in a bit of pain. My scar feels a bit burning My arm also feels achy.   She was compliant with home exercise program given last session.   Response to previous treatment:supination improved   Functional change: none     Pain: 5/10  Location: right elbow throbs, sore stiff      OBJECTIVE     Objective Measures updated at progress report unless specified.    Edema. Measured in centimeters.    4/23/2024         Proximal Wrist Crease 15.8 cm   Proximal Palmar Crease      MCPs           UE  ROM. Measured in degrees.    4/23/2024         Wrist ext/flex  30 / 45    Sup/pro  70 / 70b         Elbow ext / flex  52/122               Sensation- intact      Special Tests: NT          Strength (Dyanmometer) and Pinch Strength (Pinch Gauge)  Measured in pounds and psi. Average of three trials.    4/23/2024 4/23/2024     Right  Left    Rung II TBA  TBA   Key Pinch       3pt Pinch       2pt Pinch         Treatment     Manisha received the treatments listed below:     Supervised Modalities after being cleared for contradictions: 10 min MH and paraffin to to R elbow x 10 min to increase blood flow, circulation and tissue elasticity prior to therex.      Therapeutic Activities to improve functional performance for 25  minutes, including:  - elbow stretch over towel roll with 1# weight x 20 sec hold x 5 reps.   - wrist flex, ext, RD, UD, sup/pron   - elbow flex, extension over towel roll in 3 positions, palm up, thumb up, palm down x 10 reps each  - juxaticiser x 2 min   - Dowel in supination elbow flexion 2 x 15 reps, pronation 2 x 15 reps  - Flexion stretch with dowel x 15 reps    Manual therapy techniques: for 10 min (including Joint mobilizations, Myofacial release, Manual Lymphatic Drainage, Soft tissue Mobilization, and Friction Massage)  were applied to the R elbow  as follows:   - scar massage to R elbow to decrease adhesions and improve tensile glide   - DTM to biceps and anti cubital fossa to decrease muscle tightness and guarding to facilitate elbow extension.       Patient Education and Home Exercises      Education provided:   - Cont HEP   - Progress towards goals     Written Home Exercises Provided: Patient instructed to cont prior HEP.  Exercises were reviewed and Manisha was able to demonstrate them prior to the end of the session.  Manisha demonstrated good  understanding of the HEP provided. See EMR under Patient Instructions for exercises provided during therapy sessions.       Assessment     Manisha tolerated session well with rest breaks as needed. Elbow extension remains limited and pt reported some superficial nerve pain today. Added various textures to HEP for desensitization program.  Manisha is progressing well towards her goals and there are no updates to goals at this time. Pt prognosis is Good.     Pt will continue to benefit from  skilled outpatient occupational therapy to address the deficits listed in the problem list on initial evaluation provide pt/family education and to maximize pt's level of independence in the home and community environment.     Pt's spiritual, cultural and educational needs considered and pt agreeable to plan of care and goals.    Anticipated barriers to occupational therapy: none     The following goals were discussed with the patient and patient is in agreement with them as to be addressed in the treatment plan.      Short Term Goals: (6 weeks):  1)  Patient to be IND with HEP and modalities for pain management  2)  Increase ROM 10-20 degrees to increase functional hand use for ADLs/work/leisure activities  3)   Decrease edema .1-.5mm to increase joint mobility /flexibility for functional hand use.   4)  Assess   and pinch and set goals accordingly      Long Term Goals :To be met by discharge (12 weeks)   1)  Increase  strength 2-8 lbs. For driving, cooking   2) Increase pinch strength 1-4 psi's for opening bottles and FM coordination   3)   Increase ROM 21-40 degrees to increase functional hand use for ADLs/work/leisure activities  4) Patient to score at _60_% OR MORE (FSM)  on FOTO to demonstrate improved perception of functional R hand  Use.     Plan   Certification Period/Plan of care expiration: 4/23/2024 to 7/23/24.    Sis Renae, OT  , CHT

## 2024-05-13 NOTE — PROGRESS NOTES
[facility]  Physical Therapy Treatment Note     Name: Manisha Wynne  Clinic Number: [id number]    Therapy Diagnosis:   Encounter Diagnoses   Name Primary?    Decreased functional activity tolerance Yes    Decreased strength of trunk and back     Balance problem        Physician: Nicolette Cheek MD    Visit Date: 5/13/2024    Physician Orders: PT Eval and Treat  Medical Diagnosis from Referral:   1. Decreased strength of trunk and back    2. Back pain, unspecified back location, unspecified back pain laterality, unspecified chronicity    3. Balance problem    4. Decreased functional activity tolerance       Evaluation Date: 4/24/2024  Authorization Period Expiration: 2/21/2025  Plan of Care Expiration: 7/24/2024  Reassessment Due: 5/24/2024  Visit # / Visits authorized: 3/20     Time In: 11:30 AM  Time Out: 12:44 PM  Total Billable Time: 57 minutes  INSURANCE and OUTCOMES: Fee for Service with FOTO Outcomes 1/3     Precautions: standard, fall, R elbow fracture     Pattern of pain determined: 1 Movement responder  MedX Testing:MedX testing visit 1      Subjective     Manisha Wynne reports that her back, especially upper back was really painful over the weekend. She isn't sure if she slept in a poor position or what may have caused it.     Patient reports tolerating previous visit well.   Patient reports their pain to be 7/10 on a 0-10 scale with 0 being no pain and 10 being the worst pain imaginable.  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  Leisure: used to walk with a friend,       Prior Level of Function: I with increased time  Current Level of Function: Difficulty with cleaning  DME owned/used: Cane, shower chair, hand held shower, grab bars  Gym Membership: No    Pt goals: Pts goals: Get healthy and get stronger  "    Objective      Lumbar  Isometric Testing on Med X equipment: Testing administered by PT    Test Initial Baseline Midpoint Final   Date 4/24/2024     ROM 6-36 deg     Max Peak Torque 80      Min Peak Torque 32      Flex/Ext Ratio 2.5     % variance  normative data 38     % change from initial test N/A visit 1         Outcomes:  Intake Score: 24%  Visit 6 Score:   Visit 10 Score:   Discharge Score:  Goal Score: 15%     Treatment     Manisha received the treatments listed below:      Medical MedX Treatment as follows:  MedX exercise started day 2:  Patient received neuromuscular education for 10 minutes via participation on the Medical MedX Machine. Therapist assisted patient in isolating and engaging spinal stabilization musculature in order to improve functional ability and postural control. Patient performed exercise with therapist guidance in order to accurately use pacer function, avoid valsalva, and optimally exert effort within a safe and effective range via the Ana Exertion Rating Scale. Patient instructed to perform at a midrange of exertion and to complete 15-20 repetitions within appropriate split time, with proper technique, and while maintaining safety.          5/13/2024    12:02 PM   [facility]   Visit Number 4   VAS Pain Rating 7   Extension in Standing 20   Flexion in Lying 10   Lumbar Weight 40 lbs   Repetitions 20   Rating of Perceived Exertion 5   Ice - Z Lie (in min.) 5        Manisha participated in neuromuscular re-education activities to improve balance, coordination, proprioception, motor control and/or posture for 13 minutes. The following activities were included:      PPT x 5" x 10  + PPT hooklying w/marching 2 x 10  + Open books x 10    Manisha participated in therapeutic exercises to develop strength, endurance, ROM, flexibility, posture, and core stabilization for 30 minutes including:    LTR x10  SKTC x10  Bridges x10  Figure 4 stretch x1' ea- np  EIS 2 x10    Peripheral " muscle strengthening which included one set of 15-20 repetitions at a slow and controlled 10-13 second per rep pace focused on strengthening supporting musculature in order to improve body mechanics and functional mobility. Patient and therapist focused on proper form during treatment to ensure optimal strengthening of each targeted muscle group.  Machines utilized included:Torso rotation, Leg Ext, Leg Curl, and Leg Press  To be added next visit:Rowing, Triceps, Biceps, and Hip Abd      Manisha participated in dynamic functional therapeutic activities to improve functional performance and simulate household and community activities for 00  minutes. The following activities were included:    Manisha received manual therapy techniques for 4 minutes. The following activities were included:    STM thoracolumbar parspinals  T4 PA glide Grade II  Lumbar rotation Grade III-IV mobs.    Pt given cold pack for 5 minutes to low back in z-lying.    Patient Education and Home Exercises     Home exercises include:  LTR x10  SKTC x10  PPT x10  Bridges x10  EIS x10    Cardio program (V5): -  Lifting education (V11): -  Posture/Lumbar roll:   Fridge Magnet Discharge handout (date given): -  Equipment at home/gym membership:     Education provided:   - PT role and POC  - HEP review  - MedX and peripheral strengthening; purpose and form    Written Home Exercises Provided: yes.  Exercises were reviewed and Manisha was able to demonstrate them prior to the end of the session.  Manisha demonstrated good  understanding of the education provided.     See EMR under Patient Instructions for exercises provided prior visit.    Assessment     Manisha presents reporting increased mid back pain and low back pain over the weekend. Treatment today focused on posterior pelvic tilt neuromuscular control and thoracic rotation range of motion. She displays difficulty orienting direction of pelvic tilt, however able to improve with tactile cueing and  bulk practice. Patient benefited from soft tissue mobilization of thoracic paraspinals to reduce muscle tightness prior to standing thoracolumbar extension. Education on cardiovascular exercise recommendations, benefits and how to begin provided along with handout. Patient's Lumbar MedX maintained to previous resistance with 20 reps completed at 5/10 RPE. Consider increasing Lumbar MedX weight no greater than 5% at next training session. Patient was able to begin core torso rotation exercise in peripheral strength circuit today, as well as seated rows with a light weight. All other peripheral circuit exercises completed except triceps press and bicep curls which can be added at the next session using light weight dumb bells.    Patient is making good progress towards established goals.    Pt will continue to benefit from skilled outpatient physical therapy to address the deficits stated in the impairment chart, provide pt/family education and to maximize pt's level of independence in the home and community environment.     Anticipated Barriers for therapy: none  Pt's spiritual, cultural and educational needs considered and pt agreeable to plan of care and goals as stated below:     GOALS: Pt is in agreement with the following goals.     Short term goals:  6 weeks or 10 visits   - Pt will demonstrate increased lumbar ROM by at least 6 degrees from the initial ROM value with improvements noted in functional ROM and ability to perform ADLs. Appropriate and Ongoing  - Pt will demonstrate increased MedX average isometric strength value by 15% from initial test resulting in improved ability to perform bending, lifting, and carrying activities safely, confidently. Appropriate and Ongoing  - Pt will report a reduction in worst pain score by 1-2 points for improved tolerance for transfers sit to stand. Appropriate and Ongoing  - Pt able to perform HEP correctly with minimal cueing or supervision from therapist to encourage  independent management of symptoms. Appropriate and Ongoing     Long term goals: 10 weeks or 20 visits   - Pt will demonstrate increased lumbar ROM by at least 12 degrees from initial ROM value, resulting in improved ability to perform functional forward bending while standing and sitting. Appropriate and Ongoing  - Pt will demonstrate increased MedX average isometric strength value by 25% from initial test resulting in improved ability to perform bending, lifting, and carrying activities safely and confidently. Appropriate and Ongoing  - Pt to demonstrate ability to independently control and reduce their pain through posture positioning and mechanical movements throughout a typical day. Appropriate and Ongoing  - Pt will demonstrate reduced pain and improved functional outcomes as reported on the FOTO by reaching a limitation score of < or = 15% or less in order to demonstrate subjective improvement in pt's condition.   Appropriate and Ongoing  - Pt will demonstrate independence with the HEP at discharge. Appropriate and Ongoing  - Pt will be able to complete safe bending and lifting up to 10# without increased low back pain (patient goal) Appropriate and Ongoing    Plan     Continue with established Plan of Care towards established PT goals.     Therapist: Yana Valenzuela, PT  5/13/2024

## 2024-05-15 ENCOUNTER — CLINICAL SUPPORT (OUTPATIENT)
Dept: REHABILITATION | Facility: OTHER | Age: 74
End: 2024-05-15
Payer: MEDICARE

## 2024-05-15 ENCOUNTER — OUTPATIENT CASE MANAGEMENT (OUTPATIENT)
Dept: ADMINISTRATIVE | Facility: OTHER | Age: 74
End: 2024-05-15
Payer: MEDICARE

## 2024-05-15 ENCOUNTER — CLINICAL SUPPORT (OUTPATIENT)
Dept: REHABILITATION | Facility: HOSPITAL | Age: 74
End: 2024-05-15
Payer: MEDICARE

## 2024-05-15 DIAGNOSIS — M25.521 ELBOW PAIN, RIGHT: Primary | ICD-10-CM

## 2024-05-15 DIAGNOSIS — R29.898 DECREASED STRENGTH OF TRUNK AND BACK: ICD-10-CM

## 2024-05-15 DIAGNOSIS — R68.89 DECREASED FUNCTIONAL ACTIVITY TOLERANCE: Primary | ICD-10-CM

## 2024-05-15 DIAGNOSIS — R26.89 BALANCE PROBLEM: ICD-10-CM

## 2024-05-15 PROCEDURE — 97018 PARAFFIN BATH THERAPY: CPT | Mod: HCNC

## 2024-05-15 PROCEDURE — 97110 THERAPEUTIC EXERCISES: CPT | Mod: HCNC,CQ

## 2024-05-15 PROCEDURE — 97112 NEUROMUSCULAR REEDUCATION: CPT | Mod: HCNC,CQ

## 2024-05-15 PROCEDURE — 97530 THERAPEUTIC ACTIVITIES: CPT | Mod: HCNC

## 2024-05-15 PROCEDURE — 97140 MANUAL THERAPY 1/> REGIONS: CPT | Mod: HCNC

## 2024-05-15 NOTE — PROGRESS NOTES
[facility]  Physical Therapy Treatment Note     Name: Manisha Wynne  Clinic Number: [id number]    Therapy Diagnosis:   Encounter Diagnoses   Name Primary?    Decreased functional activity tolerance Yes    Decreased strength of trunk and back     Balance problem          Physician: Nicolette Cheek MD    Visit Date: 5/15/2024    Physician Orders: PT Eval and Treat  Medical Diagnosis from Referral:   1. Decreased strength of trunk and back    2. Back pain, unspecified back location, unspecified back pain laterality, unspecified chronicity    3. Balance problem    4. Decreased functional activity tolerance       Evaluation Date: 4/24/2024  Authorization Period Expiration: 2/21/2025  Plan of Care Expiration: 7/24/2024  Reassessment Due: 5/24/2024  Visit # / Visits authorized: 4/20     Time In: 1230 PM  Time Out: 1215 PM  Total Billable Time: 30 minutes  INSURANCE and OUTCOMES: Fee for Service with FOTO Outcomes 1/3     Precautions: standard, fall, R elbow fracture     Pattern of pain determined: 1 Movement responder  MedX Testing:MedX testing visit 1      Subjective     Manisha Wynne reports she is doing much better today.    Patient reports tolerating previous visit well.   Patient reports their pain to be 3/10 on a 0-10 scale with 0 being no pain and 10 being the worst pain imaginable.  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  Leisure: used to walk with a friend,       Prior Level of Function: I with increased time  Current Level of Function: Difficulty with cleaning  DME owned/used: Cane, shower chair, hand held shower, grab bars  Gym Membership: No    Pt goals: Pts goals: Get healthy and get stronger     Objective      Lumbar  Isometric Testing on Med X equipment: Testing administered by PT    Test Initial Baseline Midpoint  "Final   Date 4/24/2024     ROM 6-36 deg     Max Peak Torque 80      Min Peak Torque 32      Flex/Ext Ratio 2.5     % variance  normative data 38     % change from initial test N/A visit 1         Outcomes:  Intake Score: 24%  Visit 6 Score:   Visit 10 Score:   Discharge Score:  Goal Score: 15%     Treatment     Manisha received the treatments listed below:      Medical MedX Treatment as follows:  MedX exercise started day 2:  Patient received neuromuscular education for 10 minutes via participation on the Medical MedX Machine. Therapist assisted patient in isolating and engaging spinal stabilization musculature in order to improve functional ability and postural control. Patient performed exercise with therapist guidance in order to accurately use pacer function, avoid valsalva, and optimally exert effort within a safe and effective range via the Ana Exertion Rating Scale. Patient instructed to perform at a midrange of exertion and to complete 15-20 repetitions within appropriate split time, with proper technique, and while maintaining safety.          5/15/2024     1:35 PM   [facility]   Visit Number 5   VAS Pain Rating 4   Extension in Standing 20   Flexion in Lying 10   Lumbar Weight 42 lbs   Repetitions 15   Rating of Perceived Exertion 6   Ice - Z Lie (in min.) 5            Manisha participated in neuromuscular re-education activities to improve balance, coordination, proprioception, motor control and/or posture for 05 minutes. The following activities were included:    PPT x 5" x 10  PPT hooklying w/marching  x 10  Open books x 10 - np     Manisha participated in therapeutic exercises to develop strength, endurance, ROM, flexibility, posture, and core stabilization for 30 minutes including:    LTR x10  SKTC x10  Bridges  2 x10  Figure 4 stretch x1' ea- np  EIS 2 x10    Peripheral muscle strengthening which included one set of 15-20 repetitions at a slow and controlled 10-13 second per rep pace " focused on strengthening supporting musculature in order to improve body mechanics and functional mobility. Patient and therapist focused on proper form during treatment to ensure optimal strengthening of each targeted muscle group.  Machines utilized included:Torso rotation, Leg Ext, Leg Curl, and Leg Press  To be added next visit:Rowing, Triceps, Biceps, and Hip Abd      Manisha participated in dynamic functional therapeutic activities to improve functional performance and simulate household and community activities for 00  minutes. The following activities were included:    Manisha received manual therapy techniques for 0 minutes. The following activities were included:    STM thoracolumbar parspinals  T4 PA glide Grade II  Lumbar rotation Grade III-IV mobs.    Pt given cold pack for 5 minutes to low back in z-lying.    Patient Education and Home Exercises     Home exercises include:  LTR x10  SKTC x10  PPT x10  Bridges x10  EIS x10    Cardio program (V5): -  Lifting education (V11): -  Posture/Lumbar roll:   Fridge Magnet Discharge handout (date given): -  Equipment at home/gym membership:     Education provided:   - PT role and POC  - HEP review  - MedX and peripheral strengthening; purpose and form    Written Home Exercises Provided: yes.  Exercises were reviewed and Manisha was able to demonstrate them prior to the end of the session.  Manisha demonstrated good  understanding of the education provided.     See EMR under Patient Instructions for exercises provided prior visit.    Assessment     Manisha presents to therapy with decreased pain symptoms following last visit. Continued previous prescribed without exacerbation. Pt was challenged with dynamic core stability exercises requiring frequent verbal and tactile cues. MedX was performed at 42ft lbs with 15 reps performed at an exertion rating of 6/10. Deferred bicep and tricep exercises due to elbow fracture and advice of pts OT. Will progress as tolerated  per HB protocols.       Patient is making good progress towards established goals.    Pt will continue to benefit from skilled outpatient physical therapy to address the deficits stated in the impairment chart, provide pt/family education and to maximize pt's level of independence in the home and community environment.     Anticipated Barriers for therapy: none  Pt's spiritual, cultural and educational needs considered and pt agreeable to plan of care and goals as stated below:     GOALS: Pt is in agreement with the following goals.     Short term goals:  6 weeks or 10 visits   - Pt will demonstrate increased lumbar ROM by at least 6 degrees from the initial ROM value with improvements noted in functional ROM and ability to perform ADLs. Appropriate and Ongoing  - Pt will demonstrate increased MedX average isometric strength value by 15% from initial test resulting in improved ability to perform bending, lifting, and carrying activities safely, confidently. Appropriate and Ongoing  - Pt will report a reduction in worst pain score by 1-2 points for improved tolerance for transfers sit to stand. Appropriate and Ongoing  - Pt able to perform HEP correctly with minimal cueing or supervision from therapist to encourage independent management of symptoms. Appropriate and Ongoing     Long term goals: 10 weeks or 20 visits   - Pt will demonstrate increased lumbar ROM by at least 12 degrees from initial ROM value, resulting in improved ability to perform functional forward bending while standing and sitting. Appropriate and Ongoing  - Pt will demonstrate increased MedX average isometric strength value by 25% from initial test resulting in improved ability to perform bending, lifting, and carrying activities safely and confidently. Appropriate and Ongoing  - Pt to demonstrate ability to independently control and reduce their pain through posture positioning and mechanical movements throughout a typical day. Appropriate and  Ongoing  - Pt will demonstrate reduced pain and improved functional outcomes as reported on the FOTO by reaching a limitation score of < or = 15% or less in order to demonstrate subjective improvement in pt's condition.   Appropriate and Ongoing  - Pt will demonstrate independence with the HEP at discharge. Appropriate and Ongoing  - Pt will be able to complete safe bending and lifting up to 10# without increased low back pain (patient goal) Appropriate and Ongoing    Plan     Continue with established Plan of Care towards established PT goals.     Therapist: Deandre Neil, PTA  5/15/2024

## 2024-05-15 NOTE — PROGRESS NOTES
[facility]  Occupational Therapy Treatment Note    Date: 5/15/2024  Name: Manisha Wynne  Clinic Number: [id number]    Therapy Diagnosis:   1. Elbow pain, right             Medical Diagnosis: R Olecranon fracture with ORIF 2/28/24  ICD-10:   Z98.890 (ICD-10-CM) - Post-operative state   S52.021A (ICD-10-CM) - Closed fracture of olecranon process of right ulna, initial encounter         Physician: Sarmad Varghese PA-C; Dr. DESIREE Guevara   Physician Orders: Eval and Treat  R Elbow Olecranon ORIF  DOS: 2/28/24  12 Visits     Surgical Procedure and Date: 2/28/24,   Procedure(s) (LRB):  ORIF, ELBOW (Right)        Evaluation Date: 4/23/2024     Plan of Care Certification Period: /23/24     Date of Return to MD: 5/10/24     Visit # / Visits authorized: 5 / 12 (20 total)   Insurance Authorization Period Expiration: 12/31/24     FOTO #1: FSM 47% / GOAL 65%  FOTO #2:   FOTO #3:      Precautions:  Standard and Weightbearing     Time In: 215 pm  Time Out: 3  Total Appointment Time (timed & untimed codes): 45 minutes    SUBJECTIVE   Patient casted 12 weeks, cast removed 5/10/24     Pt reports: Im actually in a bit of pain. My scar feels a bit burning My arm also feels achy. It doesn't keep me up at night, but no padding on the back of my elbow   She was compliant with home exercise program given last session.   Response to previous treatment:supination improved   Functional change: none     Pain: 6/10  Location: right elbow throbs, sore stiff      OBJECTIVE     Objective Measures updated at progress report unless specified.    Edema. Measured in centimeters.    4/23/2024         Proximal Wrist Crease 15.8 cm   Proximal Palmar Crease      MCPs           UE  ROM. Measured in degrees.    4/23/2024 5/15/24          Wrist ext/flex  30 / 45  =/=   Sup/pro  70 / 70 75/90          Elbow ext / flex  52/122 25/ 130                Sensation- intact      Special Tests: NT          Strength  (Dyanmometer) and Pinch Strength (Pinch Gauge)  Measured in pounds and psi. Average of three trials.    4/23/2024 4/23/2024     Right  Left    Rung II TBA TBA   Key Pinch       3pt Pinch       2pt Pinch         Treatment     Manisha received the treatments listed below:     Supervised Modalities after being cleared for contradictions: 10 min MH and paraffin to to R elbow and hand  x 10 min to increase blood flow, circulation and tissue elasticity prior to therex.      Therapeutic Activities to improve functional performance for 25  minutes, including:  - Re-assessed ROM above:   - elbow stretch over towel roll with 1# weight x 20 sec hold x 10 reps.   - wrist flex, ext, RD, UD, sup/pron x 10 reps each   - elbow flex, extension over towel roll in 3 positions, palm up, thumb up, palm down x 10 reps each  - juxaticiser x 2 min   - supination wheel for sup/pro 2 x 10 reps   - 0# Dowel in supination elbow flexion/ extension  2 x 15 reps, pronation 2 x 15 reps  - chest press with 0# dowel x 15 reps    Manual therapy techniques: for 10 min (including Joint mobilizations, Myofacial release, Manual Lymphatic Drainage, Soft tissue Mobilization, and Friction Massage)  were applied to the R elbow  as follows:   - scar massage to R elbow to decrease adhesions and improve tensile glide   - DTM to biceps and anti cubital fossa to decrease muscle tightness and guarding to facilitate elbow extension.       Patient Education and Home Exercises      Education provided:   - Cont HEP   - Progress towards goals     Written Home Exercises Provided: Patient instructed to cont prior HEP.  Exercises were reviewed and Manisha was able to demonstrate them prior to the end of the session.  Manisha demonstrated good  understanding of the HEP provided. See EMR under Patient Instructions for exercises provided during therapy sessions.       Assessment     Manisha tolerated session well with rest breaks as needed. Elbow extension remains limited  and pt reported some superficial nerve pain today. Added various textures to HEP for desensitization program.  Manisha is progressing well towards her goals and there are no updates to goals at this time. Pt prognosis is Good.     Pt will continue to benefit from skilled outpatient occupational therapy to address the deficits listed in the problem list on initial evaluation provide pt/family education and to maximize pt's level of independence in the home and community environment.     Pt's spiritual, cultural and educational needs considered and pt agreeable to plan of care and goals.    Anticipated barriers to occupational therapy: none     The following goals were discussed with the patient and patient is in agreement with them as to be addressed in the treatment plan.      Short Term Goals: (6 weeks):  1)  Patient to be IND with HEP and modalities for pain management  2)  Increase ROM 10-20 degrees to increase functional hand use for ADLs/work/leisure activities  3)   Decrease edema .1-.5mm to increase joint mobility /flexibility for functional hand use.   4)  Assess   and pinch and set goals accordingly      Long Term Goals :To be met by discharge (12 weeks)   1)  Increase  strength 2-8 lbs. For driving, cooking   2) Increase pinch strength 1-4 psi's for opening bottles and FM coordination   3)   Increase ROM 21-40 degrees to increase functional hand use for ADLs/work/leisure activities  4) Patient to score at _60_% OR MORE (FSM)  on FOTO to demonstrate improved perception of functional R hand  Use.     Plan   Certification Period/Plan of care expiration: 4/23/2024 to 7/23/24.    Desi Medellin, OT  , CHT

## 2024-05-16 ENCOUNTER — OUTPATIENT CASE MANAGEMENT (OUTPATIENT)
Dept: ADMINISTRATIVE | Facility: OTHER | Age: 74
End: 2024-05-16
Payer: MEDICARE

## 2024-05-16 NOTE — PROGRESS NOTES
Outpatient Care Management  Plan of Care Follow Up Visit    Patient: Manisha Wynne  MRN: [id number]  Date of Service: 05/16/2024  Completed by: Tamara Lopez RN  Referral Date: 03/29/2024    Reason for Visit   Patient presents with    OPCM RN Follow Up Call       Brief Summary: OPCM RN followed up with Mrs. Dyer today for care plan review.    Next Steps:   Review pain rating.  Review therapy visits, how is Mrs. Dyer feeling?   Mrs. Dyer agreed to OPCM RN follow up on or around 6/7/24.

## 2024-05-17 NOTE — PROGRESS NOTES
Outpatient Care Management   - Patient Assessment    Patient: Manisha Wynne  MRN:  [id number]  Date of Service:  5/17/2024  Completed by:  Lary Sheridan LCSW  Referral Date: 03/29/2024    Reason for Visit   Patient presents with    Other     5/9/2024  See notes  5/2/2024  1st attempt to complete Initial Assessment for Outpatient Care Management, left message. Will send unable to assess letter through portal.  4/25/2024  Received return call from patient who requests to have a conversation with SW tomorrow due to patient having various meetings scheduled today.  4/17/2024  1st attempt to complete Initial Assessment for Outpatient Care Management, left message. Will send unable to assess letter through portal.    Social Work Assessment     05/17/2024    Case Closure     05/17/2024       Brief Summary:  received a referral from OPCM RN for the following HR SW psychosocial needs: transportation and PHQ score of 9. Spoke with patient who reports she has recovered and is now able to drive, also can rely on her friends for transportation if needed. Completed GAD7 with a score of 7, mild anxiety. Patient states she is on medication for anxiety and has been seen by psychiatry in the past, declines any further resources for anxiety. Patient reports she tries to keep her mind occupied and focus on one task at a time when she feels anxious. Patient denies any urgent financial strain, food insecurity, or transportation issues. Care plan was created in collaboration with patient/caregiver input.  completed the SDOH questionnaire. Collaborated with OPCM RN.

## 2024-05-20 ENCOUNTER — CLINICAL SUPPORT (OUTPATIENT)
Dept: REHABILITATION | Facility: OTHER | Age: 74
End: 2024-05-20
Payer: MEDICARE

## 2024-05-20 ENCOUNTER — CLINICAL SUPPORT (OUTPATIENT)
Dept: REHABILITATION | Facility: HOSPITAL | Age: 74
End: 2024-05-20
Payer: MEDICARE

## 2024-05-20 DIAGNOSIS — R68.89 DECREASED FUNCTIONAL ACTIVITY TOLERANCE: Primary | ICD-10-CM

## 2024-05-20 DIAGNOSIS — M25.521 ELBOW PAIN, RIGHT: Primary | ICD-10-CM

## 2024-05-20 DIAGNOSIS — R26.89 BALANCE PROBLEM: ICD-10-CM

## 2024-05-20 DIAGNOSIS — R29.898 DECREASED STRENGTH OF TRUNK AND BACK: ICD-10-CM

## 2024-05-20 PROCEDURE — 97112 NEUROMUSCULAR REEDUCATION: CPT | Mod: CQ

## 2024-05-20 PROCEDURE — 97110 THERAPEUTIC EXERCISES: CPT | Mod: CQ

## 2024-05-20 PROCEDURE — 97140 MANUAL THERAPY 1/> REGIONS: CPT

## 2024-05-20 PROCEDURE — 97530 THERAPEUTIC ACTIVITIES: CPT

## 2024-05-20 PROCEDURE — 97018 PARAFFIN BATH THERAPY: CPT | Mod: 59

## 2024-05-20 NOTE — PROGRESS NOTES
"    [facility]  Occupational Therapy Treatment Note    Date: 5/20/2024  Name: Manisha Wynne  Clinic Number: [id number]    Therapy Diagnosis:   1. Elbow pain, right               Medical Diagnosis: R Olecranon fracture with ORIF 2/28/24  ICD-10:   Z98.890 (ICD-10-CM) - Post-operative state   S52.021A (ICD-10-CM) - Closed fracture of olecranon process of right ulna, initial encounter         Physician: Sarmad Varghese PA-C; Dr. DESIREE Guevara   Physician Orders: Eval and Treat  R Elbow Olecranon ORIF  DOS: 2/28/24  12 Visits     Surgical Procedure and Date: 2/28/24,   Procedure(s) (LRB):  ORIF, ELBOW (Right)        Evaluation Date: 4/23/2024     Plan of Care Certification Period: /23/24     Date of Return to MD: 5/10/24     Visit # / Visits authorized: 5 / 12 (20 total)   Insurance Authorization Period Expiration: 12/31/24     FOTO #1: FSM 47% / GOAL 65%  FOTO #2:   FOTO #3:      Precautions:  Standard and Weightbearing     Time In: 10:50 am  Time Out: 11:35 am  Total Appointment Time (timed & untimed codes): 45 minutes    SUBJECTIVE   Patient casted 12 weeks, cast removed 5/10/24     Pt reports: "I had a lot of pain yesterday. When I go to grab for something I'd get a throbbing pain, and I have to stop. I went to grab a pen and it would hurt to write" "Elbow felt locked"    She was compliant with home exercise program given last session.   Response to previous treatment:supination improved   Functional change: none     Pain: 6/10  Location: right elbow throbs, sore stiff      OBJECTIVE     Objective Measures updated at progress report unless specified.    Edema. Measured in centimeters.    4/23/2024         Proximal Wrist Crease 15.8 cm   Proximal Palmar Crease      MCPs           UE  ROM. Measured in degrees.    4/23/2024 5/15/24          Wrist ext/flex  30 / 45  =/=   Sup/pro  70 / 70 75/90          Elbow ext / flex  52/122 25/ 130                Sensation- intact      Special Tests: " NT          Strength (Dyanmometer) and Pinch Strength (Pinch Gauge)  Measured in pounds and psi. Average of three trials.    4/23/2024 4/23/2024     Right  Left    Rung II TBA TBA   Key Pinch       3pt Pinch       2pt Pinch         Treatment     Manisha received the treatments listed below:     Supervised Modalities after being cleared for contradictions: 10 min MH and paraffin to to R elbow and hand  x 10 min to increase blood flow, circulation and tissue elasticity prior to therex.      Therapeutic Activities to improve functional performance for 25  minutes, including:  - elbow stretch standing with 1# weight x 20 sec hold x 10 reps   - wrist flex, ext, RD, UD, sup/pron x 10 reps each   -- supination wheel for sup/pro 2 x 10 reps   - 0# Dowel in supination elbow flexion/ extension  2 x 15 reps, pronation 2 x 15 reps  - chest press with 0# dowel x 15 reps  -triceps extension with yellow resistance band x 10 reps     Manual therapy techniques: for 10 min (including Joint mobilizations, Myofacial release, Manual Lymphatic Drainage, Soft tissue Mobilization, and Friction Massage)  were applied to the R elbow  as follows:   - scar massage to R elbow to decrease adhesions and improve tensile glide   - DTM to biceps and anti cubital fossa to decrease muscle tightness and guarding to facilitate elbow extension.       Patient Education and Home Exercises      Education provided:   - Cont HEP   - Progress towards goals     Written Home Exercises Provided: Patient instructed to cont prior HEP.  Exercises were reviewed and Manisha was able to demonstrate them prior to the end of the session.  Manisha demonstrated good  understanding of the HEP provided. See EMR under Patient Instructions for exercises provided during therapy sessions.       Assessment     Manisha tolerated session well with rest breaks as needed. Elbow extension remains limited and pt with c/o pain throughout exercises.  Manisha is progressing well  towards her goals and there are no updates to goals at this time. Pt prognosis is Good.     Pt will continue to benefit from skilled outpatient occupational therapy to address the deficits listed in the problem list on initial evaluation provide pt/family education and to maximize pt's level of independence in the home and community environment.     Pt's spiritual, cultural and educational needs considered and pt agreeable to plan of care and goals.    Anticipated barriers to occupational therapy: none     The following goals were discussed with the patient and patient is in agreement with them as to be addressed in the treatment plan.      Short Term Goals: (6 weeks):  1)  Patient to be IND with HEP and modalities for pain management  2)  Increase ROM 10-20 degrees to increase functional hand use for ADLs/work/leisure activities  3)   Decrease edema .1-.5mm to increase joint mobility /flexibility for functional hand use.   4)  Assess   and pinch and set goals accordingly      Long Term Goals :To be met by discharge (12 weeks)   1)  Increase  strength 2-8 lbs. For driving, cooking   2) Increase pinch strength 1-4 psi's for opening bottles and FM coordination   3)   Increase ROM 21-40 degrees to increase functional hand use for ADLs/work/leisure activities  4) Patient to score at _60_% OR MORE (FSM)  on FOTO to demonstrate improved perception of functional R hand  Use.     Plan   Certification Period/Plan of care expiration: 4/23/2024 to 7/23/24.    CAROLINA Saenz, OT  , CHT

## 2024-05-20 NOTE — PROGRESS NOTES
[facility]  Physical Therapy Treatment Note     Name: Manisha Wynne  Clinic Number: [id number]    Therapy Diagnosis:   Encounter Diagnoses   Name Primary?    Decreased functional activity tolerance Yes    Decreased strength of trunk and back     Balance problem          Physician: Nicolette Cheek MD    Visit Date: 5/20/2024    Physician Orders: PT Eval and Treat  Medical Diagnosis from Referral:   1. Decreased strength of trunk and back    2. Back pain, unspecified back location, unspecified back pain laterality, unspecified chronicity    3. Balance problem    4. Decreased functional activity tolerance       Evaluation Date: 4/24/2024  Authorization Period Expiration: 2/21/2025  Plan of Care Expiration: 7/24/2024  Reassessment Due: 5/24/2024  Visit # / Visits authorized: 4/20     Time In:  1157 AM  Time Out: 12 55 PM   Total Billable Time: 58 minutes  INSURANCE and OUTCOMES: Fee for Service with FOTO Outcomes 1/3     Precautions: standard, fall, R elbow fracture     Pattern of pain determined: 1 Movement responder  MedX Testing:MedX testing visit 1      Subjective     Manisha Wynne reports she is having trouble with tripping over her feet. She thinks she is wearing the right shoes and she thinks she picks up her feet. She has walked 3 x since she saw me last. She walked 45 minutes one day. Her back is doing good though.     Patient reports tolerating previous visit well.   Patient reports their pain to be 3/10 on a 0-10 scale with 0 being no pain and 10 being the worst pain imaginable.  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  Leisure: used to walk with a friend,       Prior Level of Function: I with increased time  Current Level of Function: Difficulty with cleaning  DME owned/used: Cane, shower chair, hand held shower,  "[email or web address]  Gym Membership: No    Pt goals: Pts goals: Get healthy and get stronger     Objective      Lumbar  Isometric Testing on Med X equipment: Testing administered by PT    Test Initial Baseline Midpoint Final   Date 4/24/2024     ROM 6-36 deg     Max Peak Torque 80      Min Peak Torque 32      Flex/Ext Ratio 2.5     % variance  normative data 38     % change from initial test N/A visit 1         Outcomes:  Intake Score: 24%  Visit 6 Score:   Visit 10 Score:   Discharge Score:  Goal Score: 15%     Treatment     Manisha received the treatments listed below:      Medical MedX Treatment as follows:  MedX exercise started day 2:  Patient received neuromuscular education for 10 minutes via participation on the Medical MedX Machine. Therapist assisted patient in isolating and engaging spinal stabilization musculature in order to improve functional ability and postural control. Patient performed exercise with therapist guidance in order to accurately use pacer function, avoid valsalva, and optimally exert effort within a safe and effective range via the Ana Exertion Rating Scale. Patient instructed to perform at a midrange of exertion and to complete 15-20 repetitions within appropriate split time, with proper technique, and while maintaining safety.            5/20/2024     1:45 PM   [facility]   Visit Number 6   VAS Pain Rating 3   Time 5   Extension in Standing 20   Flexion in Lying 10   Lumbar Weight 42 lbs   Repetitions 20   Rating of Perceived Exertion 4   Ice - Z Lie (in min.) 5              Manisha participated in neuromuscular re-education activities to improve balance, coordination, proprioception, motor control and/or posture for 05 minutes. The following activities were included:    PPT x 5" x 10  PPT hooklying w/marching  x 10  +TrA x 10  (no PB)  TrA with SLR x 5 ea  Open books x 10 - np     Manisha participated in therapeutic exercises to develop strength, endurance, ROM, flexibility, " posture, and core stabilization for 38 minutes including:    LTR x10  SKTC x10  Bridges  2 x10  Figure 4 stretch x1' ea- np  EIS 2 x10      Peripheral muscle strengthening which included one set of 15-20 repetitions at a slow and controlled 10-13 second per rep pace focused on strengthening supporting musculature in order to improve body mechanics and functional mobility. Patient and therapist focused on proper form during treatment to ensure optimal strengthening of each targeted muscle group.  Machines utilized included:Torso rotation, Leg Ext, Leg Curl, and Leg Press  To be added next visit: Hip abduction       Manisha participated in dynamic functional therapeutic activities to improve functional performance and simulate household and community activities for 05 minutes. The following activities were included:    Ambulating with SPC to focus on gait mechanics and avoid tripping.     Manisha received manual therapy techniques for 0 minutes. The following activities were included:    STM thoracolumbar parspinals  T4 PA glide Grade II  Lumbar rotation Grade III-IV mobs.    Pt given cold pack for 5 minutes to low back in z-lying.    Patient Education and Home Exercises     Home exercises include:  LTR x10  SKTC x10  PPT x10  Bridges x10  EIS x10    Cardio program (V5): -  Lifting education (V11): -  Posture/Lumbar roll:   Fridge Magnet Discharge handout (date given): -  Equipment at home/gym membership:     Education provided:   - PT role and POC  - HEP review  - MedX and peripheral strengthening; purpose and form    Written Home Exercises Provided: yes.  Exercises were reviewed and Manisha was able to demonstrate them prior to the end of the session.  Manisha demonstrated good  understanding of the education provided.     See EMR under Patient Instructions for exercises provided prior visit.    Assessment     Manisha tolerated session well. Due to pts reports of frequent tripping, reviewed ambulation with cane and  heel toe gait pattern. Introduced further core stability with overall good performance after mod cues. MedX was performed at 42ft lbs with 20 reps performed at an exertion rating of 4/10. No issues with peripherals.         Patient is making good progress towards established goals.    Pt will continue to benefit from skilled outpatient physical therapy to address the deficits stated in the impairment chart, provide pt/family education and to maximize pt's level of independence in the home and community environment.     Anticipated Barriers for therapy: none  Pt's spiritual, cultural and educational needs considered and pt agreeable to plan of care and goals as stated below:     GOALS: Pt is in agreement with the following goals.     Short term goals:  6 weeks or 10 visits   - Pt will demonstrate increased lumbar ROM by at least 6 degrees from the initial ROM value with improvements noted in functional ROM and ability to perform ADLs. Appropriate and Ongoing  - Pt will demonstrate increased MedX average isometric strength value by 15% from initial test resulting in improved ability to perform bending, lifting, and carrying activities safely, confidently. Appropriate and Ongoing  - Pt will report a reduction in worst pain score by 1-2 points for improved tolerance for transfers sit to stand. Appropriate and Ongoing  - Pt able to perform HEP correctly with minimal cueing or supervision from therapist to encourage independent management of symptoms. Appropriate and Ongoing     Long term goals: 10 weeks or 20 visits   - Pt will demonstrate increased lumbar ROM by at least 12 degrees from initial ROM value, resulting in improved ability to perform functional forward bending while standing and sitting. Appropriate and Ongoing  - Pt will demonstrate increased MedX average isometric strength value by 25% from initial test resulting in improved ability to perform bending, lifting, and carrying activities safely and  confidently. Appropriate and Ongoing  - Pt to demonstrate ability to independently control and reduce their pain through posture positioning and mechanical movements throughout a typical day. Appropriate and Ongoing  - Pt will demonstrate reduced pain and improved functional outcomes as reported on the FOTO by reaching a limitation score of < or = 15% or less in order to demonstrate subjective improvement in pt's condition.   Appropriate and Ongoing  - Pt will demonstrate independence with the HEP at discharge. Appropriate and Ongoing  - Pt will be able to complete safe bending and lifting up to 10# without increased low back pain (patient goal) Appropriate and Ongoing    Plan     Continue with established Plan of Care towards established PT goals.     Therapist: Deandre Neil, PTA  5/20/2024

## 2024-05-21 ENCOUNTER — HOSPITAL ENCOUNTER (OUTPATIENT)
Dept: RADIOLOGY | Facility: HOSPITAL | Age: 74
Discharge: HOME | End: 2024-05-21
Attending: INTERNAL MEDICINE
Payer: MEDICARE

## 2024-05-21 VITALS — BODY MASS INDEX: 23.04 KG/M2 | HEIGHT: 68 IN | WEIGHT: 152 LBS

## 2024-05-21 DIAGNOSIS — Z12.31 SCREENING MAMMOGRAM, ENCOUNTER FOR: ICD-10-CM

## 2024-05-21 PROCEDURE — 77067 SCR MAMMO BI INCL CAD: CPT | Mod: TC

## 2024-05-21 PROCEDURE — 77067 SCR MAMMO BI INCL CAD: CPT | Mod: 26,,, | Performed by: RADIOLOGY

## 2024-05-21 PROCEDURE — 77063 BREAST TOMOSYNTHESIS BI: CPT | Mod: 26,,, | Performed by: RADIOLOGY

## 2024-05-22 ENCOUNTER — CLINICAL SUPPORT (OUTPATIENT)
Dept: REHABILITATION | Facility: HOSPITAL | Age: 74
End: 2024-05-22
Payer: MEDICARE

## 2024-05-22 DIAGNOSIS — M25.521 ELBOW PAIN, RIGHT: Primary | ICD-10-CM

## 2024-05-22 PROCEDURE — 97530 THERAPEUTIC ACTIVITIES: CPT

## 2024-05-22 PROCEDURE — 97140 MANUAL THERAPY 1/> REGIONS: CPT

## 2024-05-22 PROCEDURE — 97018 PARAFFIN BATH THERAPY: CPT

## 2024-05-22 NOTE — PROGRESS NOTES
"  [facility]  Occupational Therapy Treatment Note    Date: 5/22/2024  Name: Manisha Wynne  Clinic Number: [id number]    Therapy Diagnosis:   1. Elbow pain, right           Medical Diagnosis: R Olecranon fracture with ORIF 2/28/24  ICD-10:   Z98.890 (ICD-10-CM) - Post-operative state   S52.021A (ICD-10-CM) - Closed fracture of olecranon process of right ulna, initial encounter         Physician: Sarmad Varghese PA-C; Dr. DESIREE Guevara   Physician Orders: Eval and Treat  R Elbow Olecranon ORIF  DOS: 2/28/24  12 Visits     Surgical Procedure and Date: 2/28/24,   Procedure(s) (LRB):  ORIF, ELBOW (Right)        Evaluation Date: 4/23/2024     Plan of Care Certification Period: 7/23/24     Date of Return to MD: 6/21/24     Visit # / Visits authorized: 7 / 12 (20 total)   Insurance Authorization Period Expiration: 12/31/24     FOTO #1: FSM 47% / GOAL 65%  FOTO #2: 5/22/24 FSM    52% / goal 65%   FOTO #3:      Precautions:  Standard and Weightbearing; Fall Rise      Time In: 1250 pm   Time Out: 135 pm   Total Appointment Time (timed & untimed codes): 45 minutes    SUBJECTIVE   Patient casted 12 weeks, cast removed 5/10/24     Pt reports: "Its' stinging on the edge of the elbow, It did lock when I move it a certain way and writing was hard, it ached. "   She was compliant with home exercise program given last session.   Response to previous treatment:supination improved   Functional change: none     Pain: 6/10  Location: right elbow throbs, sore stiff      OBJECTIVE     Objective Measures updated at progress report unless specified.    Edema. Measured in centimeters.    4/23/2024 5/22/24          Proximal Wrist Crease 15.8 cm =   Proximal Palmar Crease       MCPs            UE  ROM. Measured in degrees.    4/23/2024 5/15/24          Wrist ext/flex  30 / 45  =/=   Sup/pro  70 / 70 75/90          Elbow ext / flex  52/122 25/ 130                Sensation- intact      Special Tests: NT       "    Strength (Dyanmometer) and Pinch Strength (Pinch Gauge)  Measured in pounds and psi. Average of three trials.    4/23/2024 4/23/2024     Right  Left    Rung II TBA TBA   Key Pinch       3pt Pinch       2pt Pinch         Treatment     Manisha received the treatments listed below:     Supervised Modalities after being cleared for contradictions: 10 min MH and paraffin to to R elbow and hand  x 10 min to increase blood flow, circulation and tissue elasticity prior to therex.      Therapeutic Activities to improve functional performance for 25  minutes, including:  - 1# wrist flex, ext, RD, UD, sup/pron x 10 reps each   - 1# elbow flex, extension over towel roll in 3 positions, palm up, thumb up, palm down x 10 reps each  - juxaticiser x 2 min   - supination wheel for sup/pro 2 x 10 reps   - 0# Dowel in supination elbow flexion/ extension  1  x 15 reps   - chest press with 0# dowel x 15 reps  -triceps extension with Yellow theraband  x 10 reps     Manual therapy techniques: for 10 min (including Joint mobilizations, Myofacial release, Manual Lymphatic Drainage, Soft tissue Mobilization, and Friction Massage)  were applied to the R elbow  as follows:   - scar massage and cupping  to R elbow to decrease adhesions and improve tensile glide   - DTM to biceps and anti cubital fossa to decrease muscle tightness and guarding to facilitate elbow extension.     Patient Education and Home Exercises      Education provided:   - Cont HEP   - Progress towards goals     Written Home Exercises Provided: Patient instructed to cont prior HEP.  Exercises were reviewed and Manisha was able to demonstrate them prior to the end of the session.  Manisha demonstrated good  understanding of the HEP provided. See EMR under Patient Instructions for exercises provided during therapy sessions.       Assessment     Manisha tolerated session well. Elbow extension remains limited and pt with minimal c/o pain throughout exercises.  Manisha is  progressing well towards her goals and there are no updates to goals at this time. Pt prognosis is Good. Will progress with  and pinch strengthening.     Pt will continue to benefit from skilled outpatient occupational therapy to address the deficits listed in the problem list on initial evaluation provide pt/family education and to maximize pt's level of independence in the home and community environment.     Pt's spiritual, cultural and educational needs considered and pt agreeable to plan of care and goals.    Anticipated barriers to occupational therapy: none     The following goals were discussed with the patient and patient is in agreement with them as to be addressed in the treatment plan.      Short Term Goals: (6 weeks):  1)  Patient to be IND with HEP and modalities for pain management- met  2)  Increase ROM 10-20 degrees to increase functional hand use for ADLs/work/leisure activities  3)   Decrease edema .1-.5mm to increase joint mobility /flexibility for functional hand use. - met  4)  Assess   and pinch and set goals accordingly      Long Term Goals :To be met by discharge (12 weeks)   1)  Increase  strength 2-8 lbs. For driving, cooking   2) Increase pinch strength 1-4 psi's for opening bottles and FM coordination   3)   Increase ROM 21-40 degrees to increase functional hand use for ADLs/work/leisure activities  4) Patient to score at _60_% OR MORE (FSM)  on FOTO to demonstrate improved perception of functional R hand  Use.     Plan   Certification Period/Plan of care expiration: 4/23/2024 to 7/23/24.    Desi Medellin, OT  , CHT

## 2024-05-23 ENCOUNTER — CLINICAL SUPPORT (OUTPATIENT)
Dept: REHABILITATION | Facility: OTHER | Age: 74
End: 2024-05-23
Payer: MEDICARE

## 2024-05-23 DIAGNOSIS — R68.89 DECREASED FUNCTIONAL ACTIVITY TOLERANCE: Primary | ICD-10-CM

## 2024-05-23 DIAGNOSIS — R29.898 DECREASED STRENGTH OF TRUNK AND BACK: ICD-10-CM

## 2024-05-23 DIAGNOSIS — R26.89 BALANCE PROBLEM: ICD-10-CM

## 2024-05-23 PROCEDURE — 97110 THERAPEUTIC EXERCISES: CPT | Mod: CQ

## 2024-05-23 PROCEDURE — 97112 NEUROMUSCULAR REEDUCATION: CPT | Mod: CQ

## 2024-05-23 NOTE — PROGRESS NOTES
[facility]  Physical Therapy Treatment Note   Name: Manisha Wynne  Clinic Number: [id number]    Therapy Diagnosis:   Encounter Diagnoses   Name Primary?    Decreased functional activity tolerance Yes    Decreased strength of trunk and back     Balance problem        Physician: Nicolette Cheek MD    Visit Date: 5/23/2024    Physician Orders: PT Eval and Treat  Medical Diagnosis from Referral:   1. Decreased strength of trunk and back    2. Back pain, unspecified back location, unspecified back pain laterality, unspecified chronicity    3. Balance problem    4. Decreased functional activity tolerance       Evaluation Date: 4/24/2024  Authorization Period Expiration: 2/21/2025  Plan of Care Expiration: 7/24/2024  Reassessment Due: 5/24/2024  Visit # / Visits authorized: 7/20 (no pacer on Medx)      Time In:  10:10AM  Time Out: 11:00AM   Total Billable Time: 50 minutes  INSURANCE and OUTCOMES: Fee for Service with FOTO Outcomes 1/3     Precautions: standard, fall, R elbow fracture     Pattern of pain determined: 1 Movement responder    MedX Testing:MedX testing visit 1  Subjective   Manisha Wynne reports she has been feeling a lot better since beginning HB program, can now put on socks and shoes with a lot more ease.         Patient reports tolerating previous visit well.   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  Leisure: used to walk with a friend,       Prior Level of Function: I with increased time  Current Level of Function: Difficulty with cleaning  DME owned/used: Cane, shower chair, hand held shower, grab bars  Gym Membership: No    Pt goals: Pts goals: Get healthy and get stronger     Objective   "    Lumbar  Isometric Testing on Med X equipment: Testing administered by PT    Test Initial Baseline Midpoint Final   Date 4/24/2024     ROM 6-36 deg     Max Peak Torque 80      Min Peak Torque 32      Flex/Ext Ratio 2.5     % variance  normative data 38     % change from initial test N/A visit 1         Outcomes:  Intake Score: 24%  Visit 6 Score:   Visit 10 Score:   Discharge Score:  Goal Score: 15%     Treatment     Manisha received the treatments listed below:      Medical MedX Treatment as follows:  MedX exercise started day 2:  Patient received neuromuscular education for 10 minutes via participation on the Medical MedX Machine. Therapist assisted patient in isolating and engaging spinal stabilization musculature in order to improve functional ability and postural control. Patient performed exercise with therapist guidance in order to accurately use pacer function, avoid valsalva, and optimally exert effort within a safe and effective range via the Ana Exertion Rating Scale. Patient instructed to perform at a midrange of exertion and to complete 15-20 repetitions within appropriate split time, with proper technique, and while maintaining safety.            5/23/2024    10:21 AM   [facility] - [facility]   Visit Number 7   VAS Pain Rating 4   Lumbar Weight 46 lbs   Repetitions 16   Rating of Perceived Exertion 4             Manisha participated in neuromuscular re-education activities to improve balance, coordination, proprioception, motor control and/or posture for 10 minutes. The following activities were included:    PPT x 5" x 10  PPT hooklying w/marching  x 10  TrA x 10  (no PB)  Bridge + GTB  x15,3"  TrA with SLR x 5 ea  Open books x 10 - np     Manisha participated in therapeutic exercises to develop strength, endurance, ROM, flexibility, posture, and core stabilization for 30 minutes including:    LTR x10  SKTC x10  Figure 4 stretch x1' ea- np  EIS 2 x10      Peripheral muscle strengthening which " included one set of 15-20 repetitions at a slow and controlled 10-13 second per rep pace focused on strengthening supporting musculature in order to improve body mechanics and functional mobility. Patient and therapist focused on proper form during treatment to ensure optimal strengthening of each targeted muscle group.  Machines utilized included:Torso rotation, Leg Ext, Leg Curl, and Leg Press  To be added next visit: Hip abduction       Manisha participated in dynamic functional therapeutic activities to improve functional performance and simulate household and community activities for 00minutes. The following activities were included:    Ambulating with SPC to focus on gait mechanics and avoid tripping.     Manisha received manual therapy techniques for 0 minutes. The following activities were included:    STM thoracolumbar parspinals  T4 PA glide Grade II  Lumbar rotation Grade III-IV mobs.    Pt given cold pack for 5 minutes to low back in z-lying  Patient Education and Home Exercises     Home exercises include:  LTR x10  SKTC x10  PPT x10  Bridges x10  EIS x10    Cardio program (V5): -  Lifting education (V11): -  Posture/Lumbar roll:   Fridge Magnet Discharge handout (date given): -  Equipment at home/gym membership:     Education provided:   - PT role and POC  - HEP review  - MedX and peripheral strengthening; purpose and form    Written Home Exercises Provided: yes.  Exercises were reviewed and Manisha was able to demonstrate them prior to the end of the session.  Manisha demonstrated good  understanding of the education provided.     See EMR under Patient Instructions for exercises provided prior visit.    Assessment   Due to late arrival unable to complete or progress exercises this visit. Removal of pacer and focus on split time allowed for improved pace/motor control on l/s medx machine. Patient able to perform 16 reps on l/s medx machine with an RPE of 4/10 with an increase in ROM to 3-42 from 0-36.  Continue to progress per HB protocol.         Patient is making good progress towards established goals.    Pt will continue to benefit from skilled outpatient physical therapy to address the deficits stated in the impairment chart, provide pt/family education and to maximize pt's level of independence in the home and community environment.     Anticipated Barriers for therapy: none  Pt's spiritual, cultural and educational needs considered and pt agreeable to plan of care and goals as stated below:     GOALS: Pt is in agreement with the following goals.     Short term goals:  6 weeks or 10 visits   - Pt will demonstrate increased lumbar ROM by at least 6 degrees from the initial ROM value with improvements noted in functional ROM and ability to perform ADLs. Appropriate and Ongoing  - Pt will demonstrate increased MedX average isometric strength value by 15% from initial test resulting in improved ability to perform bending, lifting, and carrying activities safely, confidently. Appropriate and Ongoing  - Pt will report a reduction in worst pain score by 1-2 points for improved tolerance for transfers sit to stand. Appropriate and Ongoing  - Pt able to perform HEP correctly with minimal cueing or supervision from therapist to encourage independent management of symptoms. Appropriate and Ongoing     Long term goals: 10 weeks or 20 visits   - Pt will demonstrate increased lumbar ROM by at least 12 degrees from initial ROM value, resulting in improved ability to perform functional forward bending while standing and sitting. Appropriate and Ongoing  - Pt will demonstrate increased MedX average isometric strength value by 25% from initial test resulting in improved ability to perform bending, lifting, and carrying activities safely and confidently. Appropriate and Ongoing  - Pt to demonstrate ability to independently control and reduce their pain through posture positioning and mechanical movements throughout a typical  day. Appropriate and Ongoing  - Pt will demonstrate reduced pain and improved functional outcomes as reported on the FOTO by reaching a limitation score of < or = 15% or less in order to demonstrate subjective improvement in pt's condition.   Appropriate and Ongoing  - Pt will demonstrate independence with the HEP at discharge. Appropriate and Ongoing  - Pt will be able to complete safe bending and lifting up to 10# without increased low back pain (patient goal) Appropriate and Ongoing    Plan     Continue with established Plan of Care towards established PT goals.     Therapist: Amina Wiggins, PTA  5/23/2024

## 2024-05-29 ENCOUNTER — CLINICAL SUPPORT (OUTPATIENT)
Dept: REHABILITATION | Facility: HOSPITAL | Age: 74
End: 2024-05-29
Payer: MEDICARE

## 2024-05-29 ENCOUNTER — CLINICAL SUPPORT (OUTPATIENT)
Dept: REHABILITATION | Facility: OTHER | Age: 74
End: 2024-05-29
Payer: MEDICARE

## 2024-05-29 DIAGNOSIS — R29.898 DECREASED STRENGTH OF TRUNK AND BACK: ICD-10-CM

## 2024-05-29 DIAGNOSIS — M25.521 ELBOW PAIN, RIGHT: Primary | ICD-10-CM

## 2024-05-29 DIAGNOSIS — R68.89 DECREASED FUNCTIONAL ACTIVITY TOLERANCE: Primary | ICD-10-CM

## 2024-05-29 DIAGNOSIS — R26.89 BALANCE PROBLEM: ICD-10-CM

## 2024-05-29 PROCEDURE — 97530 THERAPEUTIC ACTIVITIES: CPT

## 2024-05-29 PROCEDURE — 97140 MANUAL THERAPY 1/> REGIONS: CPT

## 2024-05-29 PROCEDURE — 97018 PARAFFIN BATH THERAPY: CPT

## 2024-05-29 PROCEDURE — 97112 NEUROMUSCULAR REEDUCATION: CPT

## 2024-05-29 PROCEDURE — 97110 THERAPEUTIC EXERCISES: CPT

## 2024-05-29 NOTE — PROGRESS NOTES
"  [facility]  Occupational Therapy Treatment Note    Date: 5/29/2024  Name: Manisha Wynne  Clinic Number: [id number]    Therapy Diagnosis:   1. Elbow pain, right             Medical Diagnosis: R Olecranon fracture with ORIF 2/28/24  ICD-10:   Z98.890 (ICD-10-CM) - Post-operative state   S52.021A (ICD-10-CM) - Closed fracture of olecranon process of right ulna, initial encounter         Physician: Sarmad Varghese PA-C; Dr. DESIREE Guevara   Physician Orders: Eval and Treat  R Elbow Olecranon ORIF  DOS: 2/28/24  12 Visits     Surgical Procedure and Date: 2/28/24,   Procedure(s) (LRB):  ORIF, ELBOW (Right)        Evaluation Date: 4/23/2024     Plan of Care Certification Period: 7/23/24     Date of Return to MD: 6/21/24     Visit # / Visits authorized: 7 / 12 (20 total)   Insurance Authorization Period Expiration: 12/31/24     FOTO #1: FSM 47% / GOAL 65%  FOTO #2: 5/22/24 FSM    52% / goal 65%   FOTO #3:      Precautions:  Standard and Weightbearing; Fall Rise      Time In: 9:20 am   Time Out: 10:00 am   Total Appointment Time (timed & untimed codes): 40 minutes    SUBJECTIVE   Patient casted 12 weeks, cast removed 5/10/24     Pt reports: "Elbow hasn't bothered me this weekend." States pain on the back of elbow when applied pressure to biceps   She was compliant with home exercise program given last session.   Response to previous treatment:supination improved   Functional change: none     Pain: 6/10  Location: right elbow throbs, sore stiff      OBJECTIVE     Objective Measures updated at progress report unless specified.    Edema. Measured in centimeters.    4/23/2024 5/22/24          Proximal Wrist Crease 15.8 cm =   Proximal Palmar Crease       MCPs            UE  ROM. Measured in degrees.    4/23/2024 5/15/24          Wrist ext/flex  30 / 45  =/=   Sup/pro  70 / 70 75/90          Elbow ext / flex  52/122 25/ 130                Sensation- intact      Special Tests: NT          " Strength (Dyanmometer) and Pinch Strength (Pinch Gauge)  Measured in pounds and psi. Average of three trials.    4/23/2024 4/23/2024     Right  Left    Rung II TBA TBA   Key Pinch       3pt Pinch       2pt Pinch         Treatment     Manisha received the treatments listed below:     Supervised Modalities after being cleared for contradictions: 10 min MH and paraffin to to R elbow and hand  x 10 min to increase blood flow, circulation and tissue elasticity prior to therex.       Manual therapy techniques: for 10 min (including Joint mobilizations, Myofacial release, Manual Lymphatic Drainage, Soft tissue Mobilization, and Friction Massage)  were applied to the R elbow  as follows:   - scar massage and cupping  to R elbow to decrease adhesions and improve tensile glide   - DTM to biceps and anti cubital fossa to decrease muscle tightness and guarding to facilitate elbow extension.      Therapeutic Activities to improve functional performance for 20  minutes, including:  - 1# wrist flex, ext, RD, UD, sup/pron x 10 reps each   - 1# elbow flex, extension over towel roll in 3 positions, palm up, thumb up, palm down x 10 reps each  -triceps extension with Yellow theraband  x 10 reps   - supination wheel for sup/pro 2 x 10 reps; c/o pain   -      Patient Education and Home Exercises      Education provided:   - Cont HEP   - Progress towards goals     Written Home Exercises Provided: Patient instructed to cont prior HEP.  Exercises were reviewed and Manisha was able to demonstrate them prior to the end of the session.  Manisha demonstrated good  understanding of the HEP provided. See EMR under Patient Instructions for exercises provided during therapy sessions.       Assessment     Elbow extension remains limited and pt with increased pain throughout exercises today. C/o pain with wrist supination/pronation.  Manisha is progressing well towards her goals and there are no updates to goals at this time. Pt prognosis is Good.  Will progress with  and pinch strengthening.     Pt will continue to benefit from skilled outpatient occupational therapy to address the deficits listed in the problem list on initial evaluation provide pt/family education and to maximize pt's level of independence in the home and community environment.     Pt's spiritual, cultural and educational needs considered and pt agreeable to plan of care and goals.    Anticipated barriers to occupational therapy: none     The following goals were discussed with the patient and patient is in agreement with them as to be addressed in the treatment plan.      Short Term Goals: (6 weeks):  1)  Patient to be IND with HEP and modalities for pain management- met  2)  Increase ROM 10-20 degrees to increase functional hand use for ADLs/work/leisure activities  3)   Decrease edema .1-.5mm to increase joint mobility /flexibility for functional hand use. - met  4)  Assess   and pinch and set goals accordingly      Long Term Goals :To be met by discharge (12 weeks)   1)  Increase  strength 2-8 lbs. For driving, cooking   2) Increase pinch strength 1-4 psi's for opening bottles and FM coordination   3)   Increase ROM 21-40 degrees to increase functional hand use for ADLs/work/leisure activities  4) Patient to score at _60_% OR MORE (FSM)  on FOTO to demonstrate improved perception of functional R hand  Use.     Plan   Certification Period/Plan of care expiration: 4/23/2024 to 7/23/24.    CAROLINA Saenz, OT  , CHT

## 2024-05-29 NOTE — PROGRESS NOTES
[facility]  Physical Therapy Treatment Note   Name: Manisha Wynne  Clinic Number: [id number]    Therapy Diagnosis:   Encounter Diagnoses   Name Primary?    Decreased functional activity tolerance Yes    Decreased strength of trunk and back     Balance problem          Physician: Nicolette Cheek MD    Visit Date: 5/29/2024    Physician Orders: PT Eval and Treat  Medical Diagnosis from Referral:   1. Decreased strength of trunk and back    2. Back pain, unspecified back location, unspecified back pain laterality, unspecified chronicity    3. Balance problem    4. Decreased functional activity tolerance       Evaluation Date: 4/24/2024  Authorization Period Expiration: 2/21/2025  Plan of Care Expiration: 7/24/2024  Reassessment Due: 5/24/2024  Visit # / Visits authorized: 8/20 (no pacer on Medx) (CHANGE ROM ON MEDX ADD NOTE     Time In:  11:00 AM  Time Out: 11:55 AM   Total Billable Time: 55 minutes  INSURANCE and OUTCOMES: Fee for Service with FOTO Outcomes 1/3     Precautions: standard, fall, R elbow fracture     Pattern of pain determined: 1 Movement responder    MedX Testing:MedX testing visit 1  Subjective   Manisha Wynne reports minimal low back pain. She continues to be compliant with HEP with no adverse symptoms.       Patient reports tolerating previous visit well.   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  Leisure: used to walk with a friend,       Prior Level of Function: I with increased time  Current Level of Function: Difficulty with cleaning  DME owned/used: Cane, shower chair, hand held shower, grab bars  Gym Membership: No    Pt goals: Pts goals: Get healthy and get stronger     Objective   "    Lumbar  Isometric Testing on Med X equipment: Testing administered by PT    Test Initial Baseline Midpoint Final   Date 4/24/2024     ROM 6-36 deg     Max Peak Torque 80      Min Peak Torque 32      Flex/Ext Ratio 2.5     % variance  normative data 38     % change from initial test N/A visit 1         Outcomes:  Intake Score: 24%  Visit 6 Score:   Visit 10 Score:   Discharge Score:  Goal Score: 15%     Treatment     Manisha received the treatments listed below:      Medical MedX Treatment as follows:  MedX exercise started day 2:  Patient received neuromuscular education for 10 minutes via participation on the Medical MedX Machine. Therapist assisted patient in isolating and engaging spinal stabilization musculature in order to improve functional ability and postural control. Patient performed exercise with therapist guidance in order to accurately use pacer function, avoid valsalva, and optimally exert effort within a safe and effective range via the Ana Exertion Rating Scale. Patient instructed to perform at a midrange of exertion and to complete 15-20 repetitions within appropriate split time, with proper technique, and while maintaining safety.            5/29/2024    10:35 AM   [facility]   Visit Number 8   VAS Pain Rating 3   Time 5   Extension in Standing 20   Flexion in Lying 10   Lumbar Weight 46 lbs   Repetitions 18   Rating of Perceived Exertion 3   Ice - Z Lie (in min.) 5              Manisha participated in neuromuscular re-education activities to improve balance, coordination, proprioception, motor control and/or posture for 35 minutes. The following activities were included:    PPT x 5" x 10  PPT hooklying w/marching  x 10  TrA x 10  (no PB)  Bridge + GTB  x15,3"  TrA with SLR x 5 ea  Open books x 10 - np     Manisha participated in therapeutic exercises to develop strength, endurance, ROM, flexibility, posture, and core stabilization for 10 minutes including:    LTR x10  SKTC " x10  Figure 4 stretch x1' ea- np  EIS 2 x10      Peripheral muscle strengthening which included one set of 15-20 repetitions at a slow and controlled 10-13 second per rep pace focused on strengthening supporting musculature in order to improve body mechanics and functional mobility. Patient and therapist focused on proper form during treatment to ensure optimal strengthening of each targeted muscle group.  Machines utilized included:Torso rotation, Leg Ext, Leg Curl, and Leg Press  To be added next visit: Hip abduction       Manisha participated in dynamic functional therapeutic activities to improve functional performance and simulate household and community activities for 00minutes. The following activities were included:    Ambulating with SPC to focus on gait mechanics and avoid tripping.     Manisha received manual therapy techniques for 0 minutes. The following activities were included:    STM thoracolumbar parspinals  T4 PA glide Grade II  Lumbar rotation Grade III-IV mobs.    Pt given cold pack for 5 minutes to low back in z-lying  Patient Education and Home Exercises     Home exercises include:  LTR x10  SKTC x10  PPT x10  Bridges x10  EIS x10    Cardio program (V5): -  Lifting education (V11): -  Posture/Lumbar roll:   Fridge Magnet Discharge handout (date given): -  Equipment at home/gym membership:     Education provided:   - PT role and POC  - HEP review  - MedX and peripheral strengthening; purpose and form    Written Home Exercises Provided: yes.  Exercises were reviewed and Manisha was able to demonstrate them prior to the end of the session.  Manisha demonstrated good  understanding of the education provided.     See EMR under Patient Instructions for exercises provided prior visit.    Assessment     Patient was able to perform exercises without exacerbation of symptoms. She requires max verbal and tactile cues for proper sequence of muscle activation with posterior pelvic tilts as she exhibited  difficulty with proper motor control. Removal of pacer and focus on split time allowed for improved pace/motor control on l/s medx machine. Maintained resistance at 46 ft lbs with progression from 16 to 18 reps on l/s medx machine with an RPE of 3/10. Slight increase in reps during peripheral circuits. No issues with peripherals. Continue to progress per HB protocol.       Patient is making good progress towards established goals.    Pt will continue to benefit from skilled outpatient physical therapy to address the deficits stated in the impairment chart, provide pt/family education and to maximize pt's level of independence in the home and community environment.     Anticipated Barriers for therapy: none  Pt's spiritual, cultural and educational needs considered and pt agreeable to plan of care and goals as stated below:     GOALS: Pt is in agreement with the following goals.     Short term goals:  6 weeks or 10 visits   - Pt will demonstrate increased lumbar ROM by at least 6 degrees from the initial ROM value with improvements noted in functional ROM and ability to perform ADLs. Appropriate and Ongoing  - Pt will demonstrate increased MedX average isometric strength value by 15% from initial test resulting in improved ability to perform bending, lifting, and carrying activities safely, confidently. Appropriate and Ongoing  - Pt will report a reduction in worst pain score by 1-2 points for improved tolerance for transfers sit to stand. Appropriate and Ongoing  - Pt able to perform HEP correctly with minimal cueing or supervision from therapist to encourage independent management of symptoms. Appropriate and Ongoing     Long term goals: 10 weeks or 20 visits   - Pt will demonstrate increased lumbar ROM by at least 12 degrees from initial ROM value, resulting in improved ability to perform functional forward bending while standing and sitting. Appropriate and Ongoing  - Pt will demonstrate increased MedX average  isometric strength value by 25% from initial test resulting in improved ability to perform bending, lifting, and carrying activities safely and confidently. Appropriate and Ongoing  - Pt to demonstrate ability to independently control and reduce their pain through posture positioning and mechanical movements throughout a typical day. Appropriate and Ongoing  - Pt will demonstrate reduced pain and improved functional outcomes as reported on the FOTO by reaching a limitation score of < or = 15% or less in order to demonstrate subjective improvement in pt's condition.   Appropriate and Ongoing  - Pt will demonstrate independence with the HEP at discharge. Appropriate and Ongoing  - Pt will be able to complete safe bending and lifting up to 10# without increased low back pain (patient goal) Appropriate and Ongoing    Plan     Continue with established Plan of Care towards established PT goals.     Therapist: Gabriela Nina, PT  5/29/2024

## 2024-06-02 ENCOUNTER — PATIENT MESSAGE (OUTPATIENT)
Dept: ADMINISTRATIVE | Facility: OTHER | Age: 74
End: 2024-06-02
Payer: MEDICARE

## 2024-06-03 ENCOUNTER — CLINICAL SUPPORT (OUTPATIENT)
Dept: REHABILITATION | Facility: OTHER | Age: 74
End: 2024-06-03
Payer: MEDICARE

## 2024-06-03 ENCOUNTER — CLINICAL SUPPORT (OUTPATIENT)
Dept: REHABILITATION | Facility: HOSPITAL | Age: 74
End: 2024-06-03
Payer: MEDICARE

## 2024-06-03 DIAGNOSIS — R68.89 DECREASED FUNCTIONAL ACTIVITY TOLERANCE: Primary | ICD-10-CM

## 2024-06-03 DIAGNOSIS — M25.521 ELBOW PAIN, RIGHT: Primary | ICD-10-CM

## 2024-06-03 DIAGNOSIS — R26.89 BALANCE PROBLEM: ICD-10-CM

## 2024-06-03 DIAGNOSIS — R29.898 DECREASED STRENGTH OF TRUNK AND BACK: ICD-10-CM

## 2024-06-03 PROCEDURE — 97530 THERAPEUTIC ACTIVITIES: CPT | Mod: HCNC

## 2024-06-03 PROCEDURE — 97018 PARAFFIN BATH THERAPY: CPT | Mod: HCNC

## 2024-06-03 PROCEDURE — 97112 NEUROMUSCULAR REEDUCATION: CPT | Mod: HCNC

## 2024-06-03 PROCEDURE — 97140 MANUAL THERAPY 1/> REGIONS: CPT | Mod: HCNC

## 2024-06-03 RX ORDER — LOSARTAN POTASSIUM 25 MG/1
25 TABLET ORAL
Qty: 90 TABLET | Refills: 3 | Status: SHIPPED | OUTPATIENT
Start: 2024-06-03

## 2024-06-03 NOTE — PROGRESS NOTES
"  [facility]  Occupational Therapy Treatment Note    Date: 6/3/2024  Name: Manisha Wynne  Clinic Number: [id number]    Therapy Diagnosis:   1. Elbow pain, right          Medical Diagnosis: R Olecranon fracture with ORIF 2/28/24  ICD-10:   Z98.890 (ICD-10-CM) - Post-operative state   S52.021A (ICD-10-CM) - Closed fracture of olecranon process of right ulna, initial encounter         Physician: Sarmad Varghese PA-C; Dr. DESIREE Guevara   Physician Orders: Eval and Treat  R Elbow Olecranon ORIF  DOS: 2/28/24  12 Visits     Surgical Procedure and Date: 2/28/24,   Procedure(s) (LRB):  ORIF, ELBOW (Right)        Evaluation Date: 4/23/2024     Plan of Care Certification Period: 7/23/24     Date of Return to MD: 6/21/24     Visit # / Visits authorized: 8 / 12 (20 total)   Insurance Authorization Period Expiration: 12/31/24     FOTO #1: FSM 47% / GOAL 65%  FOTO #2: 5/22/24 FSM    52% / goal 65%   FOTO #3:      Precautions:  Standard and Weightbearing; Fall Rise      Time In: 9:30 am   Time Out: 10:15 am   Total Appointment Time (timed & untimed codes): 45 minutes    SUBJECTIVE   Patient casted 12 weeks, cast removed 5/10/24     Pt reports: "Elbow hasn't bothered me this weekend." States pain on the back of elbow when applied pressure to biceps   She was compliant with home exercise program given last session.   Response to previous treatment:supination improved   Functional change: none     Pain: 6/10  Location: right elbow throbs, sore stiff      OBJECTIVE     Objective Measures updated at progress report unless specified.    Edema. Measured in centimeters.    4/23/2024 5/22/24          Proximal Wrist Crease 15.8 cm =   Proximal Palmar Crease       MCPs            UE  ROM. Measured in degrees.    4/23/2024 5/15/24          Wrist ext/flex  30 / 45  =/=   Sup/pro  70 / 70 75/90          Elbow ext / flex  52/122 25/ 130                Sensation- intact      Special Tests: NT          Strength " (Dyanmometer) and Pinch Strength (Pinch Gauge)  Measured in pounds and psi. Average of three trials.    4/23/2024 4/23/2024     Right  Left    Rung II TBA TBA   Key Pinch       3pt Pinch       2pt Pinch         Treatment     Manisha received the treatments listed below:     Supervised Modalities after being cleared for contradictions: 10 min MH and paraffin to to R elbow and hand  x 10 min to increase blood flow, circulation and tissue elasticity prior to therex.     Manual therapy techniques: for 10 min (including Joint mobilizations, Myofacial release, Manual Lymphatic Drainage, Soft tissue Mobilization, and Friction Massage)  were applied to the R elbow  as follows:   - scar massage and cupping  to R elbow to decrease adhesions and improve tensile glide   - DTM to biceps and anti cubital fossa to decrease muscle tightness and guarding to facilitate elbow extension.      Therapeutic Activities to improve functional performance for 25  minutes, including:  - 1# wrist flex, ext, RD, UD, sup/pron x 10 reps each   - 1# elbow flex, extension over towel roll in 3 positions, palm up, thumb up, palm down x 10 reps each  - yellow putty for gross  x 10 reps   -triceps extension with Yellow theraband  x 10 reps   - supination wheel for sup/pro 2 x 10 reps; c/o pain   -  Patient Education and Home Exercises      Education provided:   - Cont HEP   - Progress towards goals     Written Home Exercises Provided: Patient instructed to cont prior HEP.  Exercises were reviewed and Manisha was able to demonstrate them prior to the end of the session.  Manisha demonstrated good  understanding of the HEP provided. See EMR under Patient Instructions for exercises provided during therapy sessions.       Assessment     Elbow extension remains limited and pt with increased pain throughout exercises today. C/o pain with wrist supination/pronation.  Manisha is progressing well towards her goals and there are no updates to goals at this  time. Pt prognosis is Good. Will progress with  and pinch strengthening.     Pt will continue to benefit from skilled outpatient occupational therapy to address the deficits listed in the problem list on initial evaluation provide pt/family education and to maximize pt's level of independence in the home and community environment.     Pt's spiritual, cultural and educational needs considered and pt agreeable to plan of care and goals.    Anticipated barriers to occupational therapy: none     The following goals were discussed with the patient and patient is in agreement with them as to be addressed in the treatment plan.      Short Term Goals: (6 weeks):  1)  Patient to be IND with HEP and modalities for pain management- met  2)  Increase ROM 10-20 degrees to increase functional hand use for ADLs/work/leisure activities  3)   Decrease edema .1-.5mm to increase joint mobility /flexibility for functional hand use. - met  4)  Assess   and pinch and set goals accordingly      Long Term Goals :To be met by discharge (12 weeks)   1)  Increase  strength 2-8 lbs. For driving, cooking   2) Increase pinch strength 1-4 psi's for opening bottles and FM coordination   3)   Increase ROM 21-40 degrees to increase functional hand use for ADLs/work/leisure activities  4) Patient to score at _60_% OR MORE (FSM)  on FOTO to demonstrate improved perception of functional R hand  Use.     Plan   Certification Period/Plan of care expiration: 4/23/2024 to 7/23/24.    Desi Medellin, OT  , CHT

## 2024-06-03 NOTE — PROGRESS NOTES
[facility]  Physical Therapy Treatment Note   Name: Manisha Wynne  Clinic Number: [id number]    Therapy Diagnosis:   Encounter Diagnoses   Name Primary?    Decreased functional activity tolerance Yes    Decreased strength of trunk and back     Balance problem      Physician: Nicolette Cheek MD    Visit Date: 6/3/2024    Physician Orders: PT Eval and Treat  Medical Diagnosis from Referral:   1. Decreased strength of trunk and back    2. Back pain, unspecified back location, unspecified back pain laterality, unspecified chronicity    3. Balance problem    4. Decreased functional activity tolerance       Evaluation Date: 4/24/2024  Authorization Period Expiration: 2/21/2025  Plan of Care Expiration: 7/24/2024  Reassessment Due: 5/24/2024  Visit # / Visits authorized: 8/20 (no pacer on Medx) (CHANGE ROM ON MEDX ADD NOTE     Time In:  10:30 AM  Time Out: 11:30 AM   Total Billable Time: 60 minutes  INSURANCE and OUTCOMES: Fee for Service with FOTO Outcomes 1/3     Precautions: standard, fall, R elbow fracture     Pattern of pain determined: 1 Movement responder    MedX Testing:MedX testing visit 1  Subjective   Manisha Wynne reports good employment and mitigation of LBP with lumbar trunk rotation exercise. She reports improvement in her symptoms overall.     Patient reports tolerating previous visit well.   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  Leisure: used to walk with a friend,       Prior Level of Function: I with increased time  Current Level of Function: Difficulty with cleaning  DME owned/used: Cane, shower chair, hand held shower, grab bars  Gym Membership: No    Pt goals: Pts goals: Get healthy and get  "stronger     Objective      Lumbar  Isometric Testing on Med X equipment: Testing administered by PT    Test Initial Baseline Midpoint Final   Date 4/24/2024     ROM 6-36 deg     Max Peak Torque 80      Min Peak Torque 32      Flex/Ext Ratio 2.5     % variance  normative data 38     % change from initial test N/A visit 1         Outcomes:  Intake Score: 24%  Visit 6 Score:   Visit 10 Score:   Discharge Score:  Goal Score: 15%     Treatment     Manisha received the treatments listed below:      Medical MedX Treatment as follows:  MedX exercise started day 2:  Patient received neuromuscular education for 5 minutes via participation on the Medical MedX Machine. Therapist assisted patient in isolating and engaging spinal stabilization musculature in order to improve functional ability and postural control. Patient performed exercise with therapist guidance in order to accurately use pacer function, avoid valsalva, and optimally exert effort within a safe and effective range via the Ana Exertion Rating Scale. Patient instructed to perform at a midrange of exertion and to complete 15-20 repetitions within appropriate split time, with proper technique, and while maintaining safety.      -Re-integrated pacer and cue to maintain pace all the way through end range extension        6/3/2024    10:48 AM   [facility]   Visit Number 9   VAS Pain Rating 3   Time 5   Extension in Standing 20   Flexion in Lying 10   Lumbar Weight 46 lbs   Repetitions 20   Rating of Perceived Exertion 3   Ice - Z Lie (in min.) 5        Manisha participated in neuromuscular re-education activities to improve balance, coordination, proprioception, motor control and/or posture for 25 minutes. The following activities were included:    PPT x 5" x 10  PPT hooklying w/marching  x 10  TrA w/ swiss ball x 10  (no PB)  Bridge + GTB  x15,3"  TrA with SLR x 5 ea  Open books x 10 - np     Manisha participated in therapeutic exercises to develop " strength, endurance, ROM, flexibility, posture, and core stabilization for 30 minutes including:    LTR x10  SKTC x10  Figure 4 stretch x1' ea- np  EIS 2 x10        6/3/2024    10:48 AM   [facility]   Visit Number 9   VAS Pain Rating 3   Time 5   Extension in Standing 20   Flexion in Lying 10   Lumbar Weight 46 lbs   Repetitions 20   Rating of Perceived Exertion 3   Ice - Z Lie (in min.) 5         Peripheral muscle strengthening which included one set of 15-20 repetitions at a slow and controlled 10-13 second per rep pace focused on strengthening supporting musculature in order to improve body mechanics and functional mobility. Patient and therapist focused on proper form during treatment to ensure optimal strengthening of each targeted muscle group.  Machines utilized included:Torso rotation, Leg Ext, Leg Curl, and Leg Press  To be added next visit: Hip abduction       Manisha participated in dynamic functional therapeutic activities to improve functional performance and simulate household and community activities for 00minutes. The following activities were included:    Ambulating with SPC to focus on gait mechanics and avoid tripping.     Manisha received manual therapy techniques for 0 minutes. The following activities were included:    STM thoracolumbar parspinals  T4 PA glide Grade II  Lumbar rotation Grade III-IV mobs.    Pt given cold pack for 5 minutes to low back in z-lying  Patient Education and Home Exercises     Home exercises include:  LTR x10  SKTC x10  PPT x10  Bridges x10  EIS x10    Cardio program (V5): -  Lifting education (V11): -  Posture/Lumbar roll:   Fridge Magnet Discharge handout (date given): -  Equipment at home/gym membership:     Education provided:   - PT role and POC  - HEP review  - MedX and peripheral strengthening; purpose and form    Written Home Exercises Provided: yes.  Exercises were reviewed and Manisha was able to demonstrate them prior to the end of the session.   Manisha demonstrated good  understanding of the education provided.     See EMR under Patient Instructions for exercises provided prior visit.    Assessment     Patient reports successful employment of HEP to reduce low back pain when it arises. She continues to display impaired neuromuscular control when activating TrA and performing posterior pelvic tilts which requires maximal verbal and tactile cues. Instructed patient to use towel roll on low back when performing posterior pelvic tilts at home. Patient's Lumbar MedX weight maintained to 46 ft. Lbs. And patient was able to complete 20 repetitions with an RPE of 3/10. She was also able to re-employ pacer and with moderate verbal cues was able to maintain good pace throughout most range of motion except end range lumbar extension due to slowed pace. No issues with peripherals. Continue to progress per HB protocol.     Patient is making good progress towards established goals.    Pt will continue to benefit from skilled outpatient physical therapy to address the deficits stated in the impairment chart, provide pt/family education and to maximize pt's level of independence in the home and community environment.     Anticipated Barriers for therapy: none  Pt's spiritual, cultural and educational needs considered and pt agreeable to plan of care and goals as stated below:     GOALS: Pt is in agreement with the following goals.     Short term goals:  6 weeks or 10 visits   - Pt will demonstrate increased lumbar ROM by at least 6 degrees from the initial ROM value with improvements noted in functional ROM and ability to perform ADLs. Appropriate and Ongoing  - Pt will demonstrate increased MedX average isometric strength value by 15% from initial test resulting in improved ability to perform bending, lifting, and carrying activities safely, confidently. Appropriate and Ongoing  - Pt will report a reduction in worst pain score by 1-2 points for improved tolerance for  transfers sit to stand. Appropriate and Ongoing  - Pt able to perform HEP correctly with minimal cueing or supervision from therapist to encourage independent management of symptoms. Appropriate and Ongoing     Long term goals: 10 weeks or 20 visits   - Pt will demonstrate increased lumbar ROM by at least 12 degrees from initial ROM value, resulting in improved ability to perform functional forward bending while standing and sitting. Appropriate and Ongoing  - Pt will demonstrate increased MedX average isometric strength value by 25% from initial test resulting in improved ability to perform bending, lifting, and carrying activities safely and confidently. Appropriate and Ongoing  - Pt to demonstrate ability to independently control and reduce their pain through posture positioning and mechanical movements throughout a typical day. Appropriate and Ongoing  - Pt will demonstrate reduced pain and improved functional outcomes as reported on the FOTO by reaching a limitation score of < or = 15% or less in order to demonstrate subjective improvement in pt's condition.   Appropriate and Ongoing  - Pt will demonstrate independence with the HEP at discharge. Appropriate and Ongoing  - Pt will be able to complete safe bending and lifting up to 10# without increased low back pain (patient goal) Appropriate and Ongoing    Plan     Continue with established Plan of Care towards established PT goals.     Therapist: Yana Valenzuela, PT  6/3/2024

## 2024-06-05 ENCOUNTER — CLINICAL SUPPORT (OUTPATIENT)
Dept: REHABILITATION | Facility: HOSPITAL | Age: 74
End: 2024-06-05
Payer: MEDICARE

## 2024-06-05 DIAGNOSIS — M25.521 ELBOW PAIN, RIGHT: Primary | ICD-10-CM

## 2024-06-05 PROCEDURE — 97140 MANUAL THERAPY 1/> REGIONS: CPT | Mod: HCNC

## 2024-06-05 PROCEDURE — 97530 THERAPEUTIC ACTIVITIES: CPT | Mod: HCNC

## 2024-06-05 PROCEDURE — 97018 PARAFFIN BATH THERAPY: CPT | Mod: HCNC

## 2024-06-05 NOTE — PROGRESS NOTES
[facility]  Occupational Therapy Treatment Note    Date: 6/5/2024  Name: Manisha Wynne  Clinic Number: [id number]    Therapy Diagnosis:   1. Elbow pain, right            Medical Diagnosis: R Olecranon fracture with ORIF 2/28/24  ICD-10:   Z98.890 (ICD-10-CM) - Post-operative state   S52.021A (ICD-10-CM) - Closed fracture of olecranon process of right ulna, initial encounter         Physician: Sarmad Varghese PA-C; Dr. DESIREE Guevara   Physician Orders: Eval and Treat  R Elbow Olecranon ORIF  DOS: 2/28/24  12 Visits     Surgical Procedure and Date: 2/28/24,   Procedure(s) (LRB):  ORIF, ELBOW (Right)        Evaluation Date: 4/23/2024     Plan of Care Certification Period: 7/23/24     Date of Return to MD: 6/21/24     Visit # / Visits authorized: 9 / 12 (20 total)   Insurance Authorization Period Expiration: 12/31/24     FOTO #1: FSM 47% / GOAL 65%  FOTO #2: 5/22/24 FSM    52% / goal 65%   FOTO #3:      Precautions:  Standard and Weightbearing; Fall Rise      Time In: 9:15 am   Time Out: 10:00 am   Total Appointment Time (timed & untimed codes): 45 minutes    SUBJECTIVE   Patient casted 12 weeks, cast removed 5/10/24     Pt reports: Elbow is doing pretty good. Using it is doing better too, it just gets tired.  I tried lifting 2# wt at store but it was too heavy  She was compliant with home exercise program given last session.   Response to previous treatment:supination improved   Functional change: none     Pain: 4/10  Location: right elbow sore stiff      OBJECTIVE     Objective Measures updated at progress report unless specified.    Edema. Measured in centimeters.    4/23/2024 5/22/24          Proximal Wrist Crease 15.8 cm =   Proximal Palmar Crease       MCPs            UE  ROM. Measured in degrees.    4/23/2024 5/15/24          Wrist ext/flex  30 / 45  =/=   Sup/pro  70 / 70 75/90          Elbow ext / flex  52/122 25/ 130                Sensation- intact      Special Tests: NT           Strength (Dyanmometer) and Pinch Strength (Pinch Gauge)  Measured in pounds and psi. Average of three trials.    4/23/2024 4/23/2024     Right  Left    Rung II TBA TBA   Key Pinch       3pt Pinch       2pt Pinch         Treatment     Manisha received the treatments listed below:     Supervised Modalities after being cleared for contradictions: 10 min MH and paraffin to to R elbow and hand  x 10 min to increase blood flow, circulation and tissue elasticity prior to therex.     Manual therapy techniques: for 10 min (including Joint mobilizations, Myofacial release, Manual Lymphatic Drainage, Soft tissue Mobilization, and Friction Massage)  were applied to the R elbow  as follows:   - scar massage and cupping  to R elbow to decrease adhesions and improve tensile glide   - DTM to biceps and anti cubital fossa to decrease muscle tightness and guarding to facilitate elbow extension.      Therapeutic Activities to improve functional performance for 20  minutes, including:  - 1# wrist flex, ext, RD, UD, sup/pron x 10 reps each   - 1# elbow flex, extension over towel roll in 3 positions, palm up, thumb up, palm down x 10 reps each  -0#  hammer for sup/pro x 10 reps   - yellow putty for gross  x 10 reps   -triceps extension with Yellow theraband  x 10 reps   --  Patient Education and Home Exercises      Education provided:   - Cont HEP   - Progress towards goals     Written Home Exercises Provided: Patient instructed to cont prior HEP.  Exercises were reviewed and Manisha was able to demonstrate them prior to the end of the session.  Manisha demonstrated good  understanding of the HEP provided. See EMR under Patient Instructions for exercises provided during therapy sessions.       Assessment     Elbow extension remains limited and pt with increased pain throughout exercises today. C/o pain with wrist supination/pronation.  Manisha is progressing well towards her goals and there are no updates to goals at this  time. Pt prognosis is Good. Will progress with  and pinch strengthening.     Pt will continue to benefit from skilled outpatient occupational therapy to address the deficits listed in the problem list on initial evaluation provide pt/family education and to maximize pt's level of independence in the home and community environment.     Pt's spiritual, cultural and educational needs considered and pt agreeable to plan of care and goals.    Anticipated barriers to occupational therapy: none     The following goals were discussed with the patient and patient is in agreement with them as to be addressed in the treatment plan.      Short Term Goals: (6 weeks):  1)  Patient to be IND with HEP and modalities for pain management- met  2)  Increase ROM 10-20 degrees to increase functional hand use for ADLs/work/leisure activities  3)   Decrease edema .1-.5mm to increase joint mobility /flexibility for functional hand use. - met  4)  Assess   and pinch and set goals accordingly      Long Term Goals :To be met by discharge (12 weeks)   1)  Increase  strength 2-8 lbs. For driving, cooking   2) Increase pinch strength 1-4 psi's for opening bottles and FM coordination   3)   Increase ROM 21-40 degrees to increase functional hand use for ADLs/work/leisure activities  4) Patient to score at _60_% OR MORE (FSM)  on FOTO to demonstrate improved perception of functional R hand  Use.     Plan   Certification Period/Plan of care expiration: 4/23/2024 to 7/23/24.    Desi Medellin, OT  , CHT

## 2024-06-06 ENCOUNTER — TELEPHONE (OUTPATIENT)
Dept: ENDOSCOPY | Facility: HOSPITAL | Age: 74
End: 2024-06-06
Payer: MEDICARE

## 2024-06-06 NOTE — TELEPHONE ENCOUNTER
Good morning, Michelle,    Message was accidentally received by Endoscopy department.  This patient has a referral in for Endocrinology.  Can you please route message to the appropriate party?    Thank you,  DORETHA Trejo

## 2024-06-10 ENCOUNTER — OUTPATIENT CASE MANAGEMENT (OUTPATIENT)
Dept: ADMINISTRATIVE | Facility: OTHER | Age: 74
End: 2024-06-10
Payer: MEDICARE

## 2024-06-10 ENCOUNTER — CLINICAL SUPPORT (OUTPATIENT)
Dept: REHABILITATION | Facility: HOSPITAL | Age: 74
End: 2024-06-10
Payer: MEDICARE

## 2024-06-10 DIAGNOSIS — M25.521 ELBOW PAIN, RIGHT: Primary | ICD-10-CM

## 2024-06-10 PROCEDURE — 97140 MANUAL THERAPY 1/> REGIONS: CPT | Mod: HCNC

## 2024-06-10 PROCEDURE — 97530 THERAPEUTIC ACTIVITIES: CPT | Mod: HCNC

## 2024-06-10 PROCEDURE — 97018 PARAFFIN BATH THERAPY: CPT | Mod: HCNC

## 2024-06-10 NOTE — LETTER
Manisha Wynne  [address]  [place] [address]      Dear Manisha Wynne,     I am your nurse with [facility]s [facility] Department. I was unsuccessful in reaching you today. At your earliest convenience, I would like to discuss your healthcare progress.      Please contact me at [phone] from 8:00AM to 4:30 PM on Monday thru Friday.     As you know, [facility] is a program offered to you through [facility] where a nurse is available 24/7 to answer questions or provide medical advice, their number is [phone].    Thanks,      Tamara Lopez, RN  Outpatient Care Management

## 2024-06-10 NOTE — PROGRESS NOTES
[facility]  Occupational Therapy Treatment Note    Date: 6/10/2024  Name: Manisha Wynne  Clinic Number: [id number]    Therapy Diagnosis:   1. Elbow pain, right              Medical Diagnosis: R Olecranon fracture with ORIF 2/28/24  ICD-10:   Z98.890 (ICD-10-CM) - Post-operative state   S52.021A (ICD-10-CM) - Closed fracture of olecranon process of right ulna, initial encounter         Physician: Sarmad Varghese PA-C; Dr. DESIREE Guevara   Physician Orders: Eval and Treat  R Elbow Olecranon ORIF  DOS: 2/28/24  12 Visits     Surgical Procedure and Date: 2/28/24,   Procedure(s) (LRB):  ORIF, ELBOW (Right)        Evaluation Date: 4/23/2024     Plan of Care Certification Period: 7/23/24     Date of Return to MD: 6/21/24     Visit # / Visits authorized: 9 / 12 (20 total)   Insurance Authorization Period Expiration: 12/31/24     FOTO #1: FSM 47% / GOAL 65%  FOTO #2: 5/22/24 FSM    52% / goal 65%   FOTO #3:      Precautions:  Standard and Weightbearing; Fall Rise      Time In: 9:38 am   Time Out: 10:15 am   Total Appointment Time (timed & untimed codes): 37 minutes    SUBJECTIVE   Patient casted 12 weeks, cast removed 5/10/24     Pt reports: She is having trouble opening bottle caps independently.   She was compliant with home exercise program given last session.   Response to previous treatment:supination improved   Functional change: none     Pain: 4/10  Location: right elbow sore stiff      OBJECTIVE     Objective Measures updated at progress report unless specified.    Edema. Measured in centimeters.    4/23/2024 5/22/24          Proximal Wrist Crease 15.8 cm =   Proximal Palmar Crease       MCPs            UE  ROM. Measured in degrees.    4/23/2024 5/15/24          Wrist ext/flex  30 / 45  =/=   Sup/pro  70 / 70 75/90          Elbow ext / flex  52/122 25/ 130                Sensation- intact      Special Tests: NT          Strength (Dyanmometer) and Pinch Strength (Pinch  Gauge)  Measured in pounds and psi. Average of three trials.    4/23/2024 4/23/2024     Right  Left    Rung II TBA TBA   Key Pinch       3pt Pinch       2pt Pinch         Treatment     Manisha received the treatments listed below:     Supervised Modalities after being cleared for contradictions: 10 min MH and paraffin to to R elbow and hand  x 10 min to increase blood flow, circulation and tissue elasticity prior to therex.     Manual therapy techniques: for 10 min (including Joint mobilizations, Myofacial release, Manual Lymphatic Drainage, Soft tissue Mobilization, and Friction Massage)  were applied to the R elbow  as follows:   - scar massage  to R elbow to decrease adhesions and improve tensile glide   - DTM to biceps, triceps and anti cubital fossa to decrease muscle tightness and guarding to facilitate elbow extension.      Therapeutic Activities to improve functional performance for 20  minutes, including:  - 1# wrist flex, ext, RD, UD x 20 reps each   - 1# elbow flex, extension over towel roll in 3 positions, palm up, thumb up, palm down x 15 reps each  - 1#  hammer for sup/pro x 10 reps   - yellow putty for gross ; log roll with tripod pinch x 2' each  - triceps extension with Yellow theraband  x 10 reps (NT)  --  Patient Education and Home Exercises      Education provided:   - Cont HEP   - Progress towards goals     Written Home Exercises Provided: Patient instructed to cont prior HEP.  Exercises were reviewed and Manisha was able to demonstrate them prior to the end of the session.  Manisha demonstrated good  understanding of the HEP provided. See EMR under Patient Instructions for exercises provided during therapy sessions.       Assessment     Elbow extension remains limited and pt with some pain during specific exercises this date. C/o pain with wrist radial deviation/ulnar deviation resistive exercises this date, specifically at the ECRL origin. However, improved progression to perform increased  repetitions or increased weight during resistive activity Noted tightness in the triceps and biceps this date. Manisha is progressing well towards her goals and there are no updates to goals at this time. Pt prognosis is Good. Will progress with  and pinch strengthening.     Pt will continue to benefit from skilled outpatient occupational therapy to address the deficits listed in the problem list on initial evaluation provide pt/family education and to maximize pt's level of independence in the home and community environment.     Pt's spiritual, cultural and educational needs considered and pt agreeable to plan of care and goals.    Anticipated barriers to occupational therapy: none     The following goals were discussed with the patient and patient is in agreement with them as to be addressed in the treatment plan.      Short Term Goals: (6 weeks):  1)  Patient to be IND with HEP and modalities for pain management- met  2)  Increase ROM 10-20 degrees to increase functional hand use for ADLs/work/leisure activities  3)   Decrease edema .1-.5mm to increase joint mobility /flexibility for functional hand use. - met  4)  Assess   and pinch and set goals accordingly      Long Term Goals :To be met by discharge (12 weeks)   1)  Increase  strength 2-8 lbs. For driving, cooking   2) Increase pinch strength 1-4 psi's for opening bottles and FM coordination   3)   Increase ROM 21-40 degrees to increase functional hand use for ADLs/work/leisure activities  4) Patient to score at _60_% OR MORE (FSM)  on FOTO to demonstrate improved perception of functional R hand  Use.     Plan   Certification Period/Plan of care expiration: 4/23/2024 to 7/23/24.    Santiago Adams OT

## 2024-06-10 NOTE — PROGRESS NOTES
6/10/2024  1st attempt to complete Follow-Up  for Outpatient Care Management, left message. Will send portal message with unable to assess letter.

## 2024-06-12 ENCOUNTER — CLINICAL SUPPORT (OUTPATIENT)
Dept: REHABILITATION | Facility: HOSPITAL | Age: 74
End: 2024-06-12
Payer: MEDICARE

## 2024-06-12 ENCOUNTER — CLINICAL SUPPORT (OUTPATIENT)
Dept: REHABILITATION | Facility: OTHER | Age: 74
End: 2024-06-12
Payer: MEDICARE

## 2024-06-12 DIAGNOSIS — R29.898 DECREASED STRENGTH OF TRUNK AND BACK: ICD-10-CM

## 2024-06-12 DIAGNOSIS — R26.89 BALANCE PROBLEM: ICD-10-CM

## 2024-06-12 DIAGNOSIS — M25.521 ELBOW PAIN, RIGHT: Primary | ICD-10-CM

## 2024-06-12 DIAGNOSIS — R68.89 DECREASED FUNCTIONAL ACTIVITY TOLERANCE: Primary | ICD-10-CM

## 2024-06-12 PROCEDURE — 97112 NEUROMUSCULAR REEDUCATION: CPT | Mod: HCNC

## 2024-06-12 PROCEDURE — 97110 THERAPEUTIC EXERCISES: CPT | Mod: HCNC

## 2024-06-12 PROCEDURE — 97530 THERAPEUTIC ACTIVITIES: CPT | Mod: HCNC

## 2024-06-12 PROCEDURE — 97140 MANUAL THERAPY 1/> REGIONS: CPT | Mod: HCNC

## 2024-06-12 PROCEDURE — 97018 PARAFFIN BATH THERAPY: CPT | Mod: HCNC

## 2024-06-12 NOTE — PROGRESS NOTES
[facility] - [facility]  Physical Therapy Treatment Note   Name: Manisha yWnne  Clinic Number: [id number]    Therapy Diagnosis:   Encounter Diagnoses   Name Primary?    Decreased functional activity tolerance Yes    Decreased strength of trunk and back     Balance problem      Physician: Nicolette Cheek MD    Visit Date: 6/12/2024    Physician Orders: PT Eval and Treat  Medical Diagnosis from Referral:   1. Decreased strength of trunk and back    2. Back pain, unspecified back location, unspecified back pain laterality, unspecified chronicity    3. Balance problem    4. Decreased functional activity tolerance       Evaluation Date: 4/24/2024  Authorization Period Expiration: 2/21/2025  Plan of Care Expiration: 7/24/2024  Reassessment Due: 5/24/2024  Visit # / Visits authorized: 10/20 (no pacer on Medx) (CHANGE ROM ON MEDX ADD NOTE     Time In:  8:00 AM  Time Out: 9:00 AM   Total Billable Time: 60 minutes  INSURANCE and OUTCOMES: Fee for Service with FOTO Outcomes 1/3     Precautions: standard, fall, R elbow fracture     Pattern of pain determined: 1 Movement responder    MedX Testing:MedX testing visit 1  Subjective   Manisha Wynne reports feeling good with minimal pain. She notices she's able to bend better especially while putting on her socks.     Patient reports tolerating previous visit well.   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  Leisure: used to walk with a friend,       Prior Level of Function: I with increased time  Current Level of Function: Difficulty with cleaning  DME owned/used: Cane, shower chair, hand held shower, grab bars  Gym Membership: No    Pt goals: Pts goals: Get healthy and get stronger      Objective      Lumbar  Isometric Testing on Med X equipment: Testing administered by PT    Test Initial Baseline Midpoint Final   Date 4/24/2024 6/12/2024    ROM 6-36 deg 0-57 deg    Max Peak Torque 80  113     Min Peak Torque 32  14     Flex/Ext Ratio 2.5 8.07:1     % variance  normative data 38 -11%     % change from initial test N/A visit 1 56%         Outcomes:  Intake Score: 24%  Visit 6 Score:   Visit 10 Score: 60%  Discharge Score:  Goal Score: 15%     Treatment     Manisha received the treatments listed below:      Medical MedX Treatment as follows:  MedX exercise started day 2:  Patient received neuromuscular education for 5 minutes via participation on the Medical MedX Machine. Therapist assisted patient in isolating and engaging spinal stabilization musculature in order to improve functional ability and postural control. Patient performed exercise with therapist guidance in order to accurately use pacer function, avoid valsalva, and optimally exert effort within a safe and effective range via the Ana Exertion Rating Scale. Patient instructed to perform at a midrange of exertion and to complete 15-20 repetitions within appropriate split time, with proper technique, and while maintaining safety.      -Re-integrated pacer and cue to maintain pace all the way through end range extension        6/12/2024     8:02 AM   [facility]   Visit Number 10   VAS Pain Rating 3   Time 5   Extension in Standing 20   Flexion in Lying 10   Lumbar Extension Seat Pad 1   Femur Restraint 6   Top Dead Center 24   Counterweight 210   Lumbar Flexion 57   Lumbar Extension 0   Lumbar Peak Torque 113 ft. lbs.   Min Torque 14   Test Percent Below Normative Data 11 %   Ice - Z Lie (in min.) 5   *INCREASE RESISTANCE NV*       Manisha participated in neuromuscular re-education activities to improve balance, coordination, proprioception, motor control and/or posture for 25 minutes. The following activities were  "included:    PPT x 5" x 10  PPT hooklying w/marching  x 10  TrA w/ swiss ball x 10  (no PB)  Bridge + GTB  x15,3"  TrA with SLR x 5 ea  Open books x 10 - np     Manisha participated in therapeutic exercises to develop strength, endurance, ROM, flexibility, posture, and core stabilization for 30 minutes including:    LTR x10  SKTC x10  Figure 4 stretch x1' ea- np  EIS 2 x10          Peripheral muscle strengthening which included one set of 15-20 repetitions at a slow and controlled 10-13 second per rep pace focused on strengthening supporting musculature in order to improve body mechanics and functional mobility. Patient and therapist focused on proper form during treatment to ensure optimal strengthening of each targeted muscle group.  Machines utilized included:Torso rotation, Leg Ext, Leg Curl, and Leg Press  To be added next visit: Hip abduction       Manisha participated in dynamic functional therapeutic activities to improve functional performance and simulate household and community activities for 00minutes. The following activities were included:    Ambulating with SPC to focus on gait mechanics and avoid tripping.     Manisha received manual therapy techniques for 0 minutes. The following activities were included:    STM thoracolumbar parspinals  T4 PA glide Grade II  Lumbar rotation Grade III-IV mobs.    Pt given cold pack for 5 minutes to low back in z-lying  Patient Education and Home Exercises     Home exercises include:  LTR x10  SKTC x10  PPT x10  Bridges x10  EIS x10    Cardio program (V5): -  Lifting education (V11): -  Posture/Lumbar roll:   Fridge Magnet Discharge handout (date given): -  Equipment at home/gym membership:     Education provided:   - PT role and POC  - HEP review  - MedX and peripheral strengthening; purpose and form    Written Home Exercises Provided: yes.  Exercises were reviewed and Manisha was able to demonstrate them prior to the end of the session.  Manisha demonstrated good  " understanding of the education provided.     See EMR under Patient Instructions for exercises provided prior visit.    Assessment     Patient presents to HB visit with minimal low back symptoms. She performed exercises without exacerbation of symptoms. Noted increased in strength, stability and endurance based on progression with MedX measurements during retest. Slightly increase reps during peripheral circuit. No issues with peripherals.  Continue to progress per  protocol.     Patient is making good progress towards established goals.    Pt will continue to benefit from skilled outpatient physical therapy to address the deficits stated in the impairment chart, provide pt/family education and to maximize pt's level of independence in the home and community environment.     Anticipated Barriers for therapy: none  Pt's spiritual, cultural and educational needs considered and pt agreeable to plan of care and goals as stated below:     GOALS: Pt is in agreement with the following goals.     Short term goals:  6 weeks or 10 visits   - Pt will demonstrate increased lumbar ROM by at least 6 degrees from the initial ROM value with improvements noted in functional ROM and ability to perform ADLs. Appropriate and Ongoing  - Pt will demonstrate increased MedX average isometric strength value by 15% from initial test resulting in improved ability to perform bending, lifting, and carrying activities safely, confidently. Appropriate and Ongoing  - Pt will report a reduction in worst pain score by 1-2 points for improved tolerance for transfers sit to stand. Appropriate and Ongoing  - Pt able to perform HEP correctly with minimal cueing or supervision from therapist to encourage independent management of symptoms. Appropriate and Ongoing     Long term goals: 10 weeks or 20 visits   - Pt will demonstrate increased lumbar ROM by at least 12 degrees from initial ROM value, resulting in improved ability to perform functional  forward bending while standing and sitting. Appropriate and Ongoing  - Pt will demonstrate increased MedX average isometric strength value by 25% from initial test resulting in improved ability to perform bending, lifting, and carrying activities safely and confidently. Appropriate and Ongoing  - Pt to demonstrate ability to independently control and reduce their pain through posture positioning and mechanical movements throughout a typical day. Appropriate and Ongoing  - Pt will demonstrate reduced pain and improved functional outcomes as reported on the FOTO by reaching a limitation score of < or = 15% or less in order to demonstrate subjective improvement in pt's condition.   Appropriate and Ongoing  - Pt will demonstrate independence with the HEP at discharge. Appropriate and Ongoing  - Pt will be able to complete safe bending and lifting up to 10# without increased low back pain (patient goal) Appropriate and Ongoing    Plan     Continue with established Plan of Care towards established PT goals.     Therapist: Gabriela Nina, PT  6/12/2024

## 2024-06-12 NOTE — PROGRESS NOTES
[facility] OUTPATIENT THERAPY AND WELLNESS  Occupational Therapy Treatment Note    Date: 6/12/2024  Name: Manisha Wynne  Clinic Number: [id number]    Therapy Diagnosis:   1. Elbow pain, right              Medical Diagnosis: R Olecranon fracture with ORIF 2/28/24  ICD-10:   Z98.890 (ICD-10-CM) - Post-operative state   S52.021A (ICD-10-CM) - Closed fracture of olecranon process of right ulna, initial encounter         Physician: Sarmad Varghese PA-C; Dr. DESIREE Guevara   Physician Orders: Eval and Treat  R Elbow Olecranon ORIF  DOS: 2/28/24  12 Visits     Surgical Procedure and Date: 2/28/24,   Procedure(s) (LRB):  ORIF, ELBOW (Right)        Evaluation Date: 4/23/2024     Plan of Care Certification Period: 7/23/24     Date of Return to MD: 6/21/24     Visit # / Visits authorized: 9 / 12 (20 total)   Insurance Authorization Period Expiration: 12/31/24     FOTO #1: FSM 47% / GOAL 65%  FOTO #2: 5/22/24 FSM    52% / goal 65%   FOTO #3:      Precautions:  Standard and Weightbearing; Fall Rise      Time In: 9:15 am   Time Out: 10:00 am   Total Appointment Time (timed & untimed codes): 45 minutes    SUBJECTIVE   Patient casted 12 weeks, cast removed 5/10/24     Pt reports: She is having trouble opening bottle caps independently.   She was compliant with home exercise program given last session.   Response to previous treatment:supination improved   Functional change: none     Pain: 4/10  Location: right elbow sore stiff      OBJECTIVE     Objective Measures updated at progress report unless specified.    Edema. Measured in centimeters.    4/23/2024 5/22/24          Proximal Wrist Crease 15.8 cm =   Proximal Palmar Crease       MCPs            UE  ROM. Measured in degrees.    4/23/2024 5/15/24          Wrist ext/flex  30 / 45  =/=   Sup/pro  70 / 70 75/90          Elbow ext / flex  52/122 25/ 130                Sensation- intact      Special Tests: NT          Strength (Dyanmometer) and Pinch Strength (Pinch  Gauge)  Measured in pounds and psi. Average of three trials.    4/23/2024 4/23/2024     Right  Left    Rung II TBA TBA   Key Pinch       3pt Pinch       2pt Pinch         Treatment     Manisha received the treatments listed below:     Supervised Modalities after being cleared for contradictions: 10 min MH and paraffin to to R elbow and hand  x 10 min to increase blood flow, circulation and tissue elasticity prior to therex.     Manual therapy techniques: for 10 min (including Joint mobilizations, Myofacial release, Manual Lymphatic Drainage, Soft tissue Mobilization, and Friction Massage)  were applied to the R elbow  as follows:   - scar massage  to R elbow to decrease adhesions and improve tensile glide   - DTM to biceps, triceps and anti cubital fossa to decrease muscle tightness and guarding to facilitate elbow extension.   - DTM to posterior and anterior forearm.      Therapeutic Activities to improve functional performance for 20  minutes, including:  - 1# wrist flex, ext, RD, UD x 20 reps each   - 1# elbow flex, extension over towel roll in 3 positions, palm up, thumb up, palm down x 15 reps each  - 1#  hammer for sup/pro x 10 reps   - yellow putty for gross ; log roll with tripod pinch x 2' each  - triceps extension with Yellow theraband  x 10 reps (NT)  --  Patient Education and Home Exercises      Education provided:   - Cont HEP   - Progress towards goals     Written Home Exercises Provided: Patient instructed to cont prior HEP.  Exercises were reviewed and Manisha was able to demonstrate them prior to the end of the session.  Manisha demonstrated good  understanding of the HEP provided. See EMR under Patient Instructions for exercises provided during therapy sessions.       Assessment     Elbow extension remains limited and pt with some pain during wrist radial deviation and elbow flexion starting at 90 degrees. C/o pain with wrist radial deviation/ulnar deviation resistive exercises this date,  specifically at the ECRL origin with noted tightness in the origin area. However, improved symptoms following manual therapy techniques. The therapist is to re-assess objective range of motion as well as strengthening measurements and focus on pinch strength next session with the patient needing yellow putty for home exercise program.  Manisha is progressing well towards her goals and there are no updates to goals at this time. Pt prognosis is Good. Will progress with  and pinch strengthening.     Pt will continue to benefit from skilled outpatient occupational therapy to address the deficits listed in the problem list on initial evaluation provide pt/family education and to maximize pt's level of independence in the home and community environment.     Pt's spiritual, cultural and educational needs considered and pt agreeable to plan of care and goals.    Anticipated barriers to occupational therapy: none     The following goals were discussed with the patient and patient is in agreement with them as to be addressed in the treatment plan.      Short Term Goals: (6 weeks):  1)  Patient to be IND with HEP and modalities for pain management- met  2)  Increase ROM 10-20 degrees to increase functional hand use for ADLs/work/leisure activities  3)   Decrease edema .1-.5mm to increase joint mobility /flexibility for functional hand use. - met  4)  Assess   and pinch and set goals accordingly      Long Term Goals :To be met by discharge (12 weeks)   1)  Increase  strength 2-8 lbs. For driving, cooking   2) Increase pinch strength 1-4 psi's for opening bottles and FM coordination   3)   Increase ROM 21-40 degrees to increase functional hand use for ADLs/work/leisure activities  4) Patient to score at _60_% OR MORE (FSM)  on FOTO to demonstrate improved perception of functional R hand  Use.     Plan   Certification Period/Plan of care expiration: 4/23/2024 to 7/23/24.    Santiago Adams, OT

## 2024-06-13 NOTE — PROGRESS NOTES
[facility] OUTPATIENT THERAPY AND [facility]LL[facility] - [facility]  Physical Therapy Treatment Note   Name: Manisha Wynne  Clinic Number: [id number]    Therapy Diagnosis:   Encounter Diagnoses   Name Primary?    Decreased functional activity tolerance Yes    Decreased strength of trunk and back     Balance problem        Physician: Nicolette Cheek MD    Visit Date: 6/14/2024    Physician Orders: PT Eval and Treat  Medical Diagnosis from Referral:   1. Decreased strength of trunk and back    2. Back pain, unspecified back location, unspecified back pain laterality, unspecified chronicity    3. Balance problem    4. Decreased functional activity tolerance       Evaluation Date: 4/24/2024  Authorization Period Expiration: 2/21/2025  Plan of Care Expiration: 7/24/2024  Reassessment Due: 5/24/2024  Visit # / Visits authorized: 11/20      Time In:  930 AM   Time Out: 1025 AM   Total Billable Time: 55 minutes  INSURANCE and OUTCOMES: Fee for Service with FOTO Outcomes 1/3     Precautions: standard, fall, R elbow fracture     Pattern of pain determined: 1 Movement responder    MedX Testing:MedX testing visit 1  Subjective   Manisha Wynne reports her back is doing good. She was a little sore after last visit and most of the machines were difficult.     Patient reports tolerating previous visit well.   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  Leisure: used to walk with a friend,       Prior Level of Function: I with increased time  Current Level of Function: Difficulty with cleaning  DME owned/used: Cane, shower chair, hand held shower, grab bars  Gym Membership: No    Pt goals: Pts goals: Get healthy and get stronger     Objective      Lumbar  Isometric Testing on Med X  "equipment: Testing administered by PT    Test Initial Baseline Midpoint Final   Date 4/24/2024 6/14/2024    ROM 6-36 deg 0-57 deg    Max Peak Torque 80  113     Min Peak Torque 32  14     Flex/Ext Ratio 2.5 8.07:1     % variance  normative data 38 -11%     % change from initial test N/A visit 1 56%         Outcomes:  Intake Score: 24%  Visit 6 Score:   Visit 10 Score: 60%  Discharge Score:  Goal Score: 15%     Treatment     Manisha received the treatments listed below:      Medical MedX Treatment as follows:  MedX exercise started day 2:  Patient received neuromuscular education for 5 minutes via participation on the Medical MedX Machine. Therapist assisted patient in isolating and engaging spinal stabilization musculature in order to improve functional ability and postural control. Patient performed exercise with therapist guidance in order to accurately use pacer function, avoid valsalva, and optimally exert effort within a safe and effective range via the Ana Exertion Rating Scale. Patient instructed to perform at a midrange of exertion and to complete 15-20 repetitions within appropriate split time, with proper technique, and while maintaining safety.      -Re-integrated pacer and cue to maintain pace all the way through end range extension        6/14/2024     9:50 AM   [facility]   Visit Number 11   Time 5   Extension in Standing 20   Flexion in Lying 10   Lumbar Weight 50 lbs   Repetitions 15   Rating of Perceived Exertion 3   Ice - Z Lie (in min.) 5          Manisha participated in neuromuscular re-education activities to improve balance, coordination, proprioception, motor control and/or posture for 10  minutes. The following activities were included:    PPT x 5" x 10  PPT hooklying w/marching  x 10  TrA w/ swiss ball x 10  (no PB)  Bridge + GTB  x15,3"  TrA with SLR x 5 ea  Open books x 10 - np     Manisha participated in therapeutic exercises to develop strength, endurance, ROM, flexibility, " posture, and core stabilization for 40 minutes including:    LTR x10  SKTC x10  Figure 4 stretch x1' ea- np  EIS 2 x10          Peripheral muscle strengthening which included one set of 15-20 repetitions at a slow and controlled 10-13 second per rep pace focused on strengthening supporting musculature in order to improve body mechanics and functional mobility. Patient and therapist focused on proper form during treatment to ensure optimal strengthening of each targeted muscle group.  Machines utilized included:Torso rotation, Leg Ext, Leg Curl, and Leg Press  To be added next visit: Hip abduction       Manisha participated in dynamic functional therapeutic activities to improve functional performance and simulate household and community activities for 00minutes. The following activities were included:    Ambulating with SPC to focus on gait mechanics and avoid tripping.     Manisha received manual therapy techniques for 0 minutes. The following activities were included:    STM thoracolumbar parspinals  T4 PA glide Grade II  Lumbar rotation Grade III-IV mobs.    Pt given cold pack for 5 minutes to low back in z-lying  Patient Education and Home Exercises     Home exercises include:  LTR x10  SKTC x10  PPT x10  Bridges x10  EIS x10    Cardio program (V5): -  Lifting education (V11): -  Posture/Lumbar roll:   Fridge Magnet Discharge handout (date given): -  Equipment at home/gym membership:     Education provided:   - PT role and POC  - HEP review  - MedX and peripheral strengthening; purpose and form    Written Home Exercises Provided: yes.  Exercises were reviewed and Manisha was able to demonstrate them prior to the end of the session.  Manisha demonstrated good  understanding of the education provided.     See EMR under Patient Instructions for exercises provided prior visit.    Assessment     Patient presents to HB visit with minimal low back symptoms. Pt was able to perform therex with min cues. MedX was  performed at 50ft lbs with 15 reps performed at an exertion rating of 3/10. Added chest press with thera tube to substitute MedX chest press with good tolerance.       Patient is making good progress towards established goals.    Pt will continue to benefit from skilled outpatient physical therapy to address the deficits stated in the impairment chart, provide pt/family education and to maximize pt's level of independence in the home and community environment.     Anticipated Barriers for therapy: none  Pt's spiritual, cultural and educational needs considered and pt agreeable to plan of care and goals as stated below:     GOALS: Pt is in agreement with the following goals.     Short term goals:  6 weeks or 10 visits   - Pt will demonstrate increased lumbar ROM by at least 6 degrees from the initial ROM value with improvements noted in functional ROM and ability to perform ADLs. Appropriate and Ongoing  - Pt will demonstrate increased MedX average isometric strength value by 15% from initial test resulting in improved ability to perform bending, lifting, and carrying activities safely, confidently. Appropriate and Ongoing  - Pt will report a reduction in worst pain score by 1-2 points for improved tolerance for transfers sit to stand. Appropriate and Ongoing  - Pt able to perform HEP correctly with minimal cueing or supervision from therapist to encourage independent management of symptoms. Appropriate and Ongoing     Long term goals: 10 weeks or 20 visits   - Pt will demonstrate increased lumbar ROM by at least 12 degrees from initial ROM value, resulting in improved ability to perform functional forward bending while standing and sitting. Appropriate and Ongoing  - Pt will demonstrate increased MedX average isometric strength value by 25% from initial test resulting in improved ability to perform bending, lifting, and carrying activities safely and confidently. Appropriate and Ongoing  - Pt to demonstrate ability  to independently control and reduce their pain through posture positioning and mechanical movements throughout a typical day. Appropriate and Ongoing  - Pt will demonstrate reduced pain and improved functional outcomes as reported on the FOTO by reaching a limitation score of < or = 15% or less in order to demonstrate subjective improvement in pt's condition.   Appropriate and Ongoing  - Pt will demonstrate independence with the HEP at discharge. Appropriate and Ongoing  - Pt will be able to complete safe bending and lifting up to 10# without increased low back pain (patient goal) Appropriate and Ongoing    Plan     Continue with established Plan of Care towards established PT goals.     Therapist: Deandre Neil, PTA  06/14/2024

## 2024-06-14 ENCOUNTER — CLINICAL SUPPORT (OUTPATIENT)
Dept: REHABILITATION | Facility: OTHER | Age: 74
End: 2024-06-14
Payer: MEDICARE

## 2024-06-14 ENCOUNTER — OUTPATIENT CASE MANAGEMENT (OUTPATIENT)
Dept: ADMINISTRATIVE | Facility: OTHER | Age: 74
End: 2024-06-14
Payer: MEDICARE

## 2024-06-14 DIAGNOSIS — R68.89 DECREASED FUNCTIONAL ACTIVITY TOLERANCE: Primary | ICD-10-CM

## 2024-06-14 DIAGNOSIS — R26.89 BALANCE PROBLEM: ICD-10-CM

## 2024-06-14 DIAGNOSIS — R29.898 DECREASED STRENGTH OF TRUNK AND BACK: ICD-10-CM

## 2024-06-14 PROCEDURE — 97112 NEUROMUSCULAR REEDUCATION: CPT | Mod: HCNC,CQ

## 2024-06-14 PROCEDURE — 97110 THERAPEUTIC EXERCISES: CPT | Mod: HCNC,CQ

## 2024-06-17 ENCOUNTER — CLINICAL SUPPORT (OUTPATIENT)
Dept: REHABILITATION | Facility: HOSPITAL | Age: 74
End: 2024-06-17
Payer: MEDICARE

## 2024-06-17 ENCOUNTER — CLINICAL SUPPORT (OUTPATIENT)
Dept: REHABILITATION | Facility: OTHER | Age: 74
End: 2024-06-17
Payer: MEDICARE

## 2024-06-17 DIAGNOSIS — M25.521 ELBOW PAIN, RIGHT: Primary | ICD-10-CM

## 2024-06-17 DIAGNOSIS — R29.898 DECREASED STRENGTH OF TRUNK AND BACK: ICD-10-CM

## 2024-06-17 DIAGNOSIS — R26.89 BALANCE PROBLEM: ICD-10-CM

## 2024-06-17 DIAGNOSIS — R68.89 DECREASED FUNCTIONAL ACTIVITY TOLERANCE: Primary | ICD-10-CM

## 2024-06-17 PROCEDURE — 97018 PARAFFIN BATH THERAPY: CPT | Mod: HCNC

## 2024-06-17 PROCEDURE — 97530 THERAPEUTIC ACTIVITIES: CPT | Mod: HCNC

## 2024-06-17 PROCEDURE — 97112 NEUROMUSCULAR REEDUCATION: CPT | Mod: HCNC

## 2024-06-17 PROCEDURE — 97110 THERAPEUTIC EXERCISES: CPT | Mod: HCNC

## 2024-06-17 NOTE — PROGRESS NOTES
[facility]  Physical Therapy Treatment Note   Name: Manisha Wynne  Clinic Number: [id number]    Therapy Diagnosis:   Encounter Diagnoses   Name Primary?    Decreased functional activity tolerance Yes    Decreased strength of trunk and back     Balance problem        Physician: Nicolette Cheek MD    Visit Date: 6/17/2024    Physician Orders: PT Eval and Treat  Medical Diagnosis from Referral:   1. Decreased strength of trunk and back    2. Back pain, unspecified back location, unspecified back pain laterality, unspecified chronicity    3. Balance problem    4. Decreased functional activity tolerance       Evaluation Date: 4/24/2024  Authorization Period Expiration: 2/21/2025  Plan of Care Expiration: 7/24/2024  Reassessment Due: 5/24/2024  Visit # / Visits authorized: 11/20      Time In:  10:31 AM   Time Out: 11:38 AM   Total Billable Time: 60 minutes  INSURANCE and OUTCOMES: Fee for Service with FOTO Outcomes 1/3     Precautions: standard, fall, R elbow fracture     Pattern of pain determined: 1 Movement responder    MedX Testing:MedX testing visit 1  Subjective   Manisha Wynne reports that her back and leg still hurts when she's walking. She feels like the frequency though has reduced. Given all of the falls she has had over the last 6-9 months she feels high fear avoidance when walking.     Patient reports tolerating previous visit well.   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  Leisure: used to walk with a friend,       Prior Level of Function: I with increased time  Current Level of Function: Difficulty with cleaning  DME owned/used: Cane, shower chair, hand held shower, grab bars  Gym Membership:  "No    Pt goals: Pts goals: Get healthy and get stronger     Objective      Lumbar  Isometric Testing on Med X equipment: Testing administered by PT    Test Initial Baseline Midpoint Final   Date 4/24/2024 6/17/2024    ROM 6-36 deg 0-57 deg    Max Peak Torque 80  113     Min Peak Torque 32  14     Flex/Ext Ratio 2.5 8.07:1     % variance  normative data 38 -11%     % change from initial test N/A visit 1 56%         Outcomes:  Intake Score: 24%  Visit 6 Score:   Visit 10 Score: 60%  Discharge Score:  Goal Score: 15%     Treatment     Manisha received the treatments listed below:      Medical MedX Treatment as follows:  MedX exercise started day 2:  Patient received neuromuscular education for 15 minutes via participation on the Medical MedX Machine. Therapist assisted patient in isolating and engaging spinal stabilization musculature in order to improve functional ability and postural control. Patient performed exercise with therapist guidance in order to accurately use pacer function, avoid valsalva, and optimally exert effort within a safe and effective range via the Ana Exertion Rating Scale. Patient instructed to perform at a midrange of exertion and to complete 15-20 repetitions within appropriate split time, with proper technique, and while maintaining safety.      -Re-integrated pacer and cue to maintain pace all the way through end range extension        6/17/2024    10:54 AM   [facility]   Visit Number 12   VAS Pain Rating 4   Time 5   Extension in Standing 10   Flexion in Lying 10   Lumbar Weight 50 lbs   Repetitions 18   Rating of Perceived Exertion 3   Ice - Z Lie (in min.) 5      Manisha participated in neuromuscular re-education activities to improve balance, coordination, proprioception, motor control and/or posture for 15 minutes. The following activities were included:    PPT x 5" x 10  PPT hooklying w/marching  x 10  TrA w/ swiss ball x 10  (no PB)  Bridge + GTB  x15,3"  TrA with SLR x 5 " ea  Open books x 10 - np   +90-90 TrA marching 2 x 10    Manisha participated in therapeutic exercises to develop strength, endurance, ROM, flexibility, posture, and core stabilization for 30 minutes including:    LTR x10  SKTC x10  Figure 4 stretch x1' ea- np  EIS 2 x10      Peripheral muscle strengthening which included one set of 15-20 repetitions at a slow and controlled 10-13 second per rep pace focused on strengthening supporting musculature in order to improve body mechanics and functional mobility. Patient and therapist focused on proper form during treatment to ensure optimal strengthening of each targeted muscle group.  Machines utilized included:Torso rotation, Leg Ext, Leg Curl, and Leg Press  To be added next visit: Hip abduction       Manisha participated in dynamic functional therapeutic activities to improve functional performance and simulate household and community activities for 00minutes. The following activities were included:    Ambulating with SPC to focus on gait mechanics and avoid tripping.     Manisha received manual therapy techniques for 0 minutes. The following activities were included:    STM thoracolumbar parspinals  T4 PA glide Grade II  Lumbar rotation Grade III-IV mobs.    Pt given cold pack for 5 minutes to low back in z-lying  Patient Education and Home Exercises     Home exercises include:  LTR x10  SKTC x10  PPT x10  Bridges x10  EIS x10    Cardio program (V5): -  Lifting education (V11): -  Posture/Lumbar roll:   Fridge Magnet Discharge handout (date given): -  Equipment at home/gym membership:     Education provided:   - PT role and POC  - HEP review  - MedX and peripheral strengthening; purpose and form    Written Home Exercises Provided: yes.  Exercises were reviewed and Manisha was able to demonstrate them prior to the end of the session.  Manisha demonstrated good  understanding of the education provided.     See EMR under Patient Instructions for exercises provided prior  visit.    Assessment     Patient presents reporting continued low back and leg pain when walking, however says that frequency has reduced slightly. Treatment trialed elevated legs during sagittal plane supported core strengthening and patient fatigues quickly. Updated HEP to address this deficit. Patient Lumbar MedX weight maintained to 50ft lbs with 18 reps performed at an exertion rating of 3/10. Pacer re-introduced to MedX training and patient required maximum verbal cues and breath coordination to improve pace. She displays slowed pace towards end range flexion and extension, however improved moderately with repetitive practice. All peripheral strength circuit exercises completed with previous modifications and no adverse effects. Patient was able to re-integrate rows and triceps extension on the weight machine, and commence light weight dumbbell bicep curls.    Patient is making good progress towards established goals.    Pt will continue to benefit from skilled outpatient physical therapy to address the deficits stated in the impairment chart, provide pt/family education and to maximize pt's level of independence in the home and community environment.     Anticipated Barriers for therapy: none  Pt's spiritual, cultural and educational needs considered and pt agreeable to plan of care and goals as stated below:     GOALS: Pt is in agreement with the following goals.     Short term goals:  6 weeks or 10 visits   - Pt will demonstrate increased lumbar ROM by at least 6 degrees from the initial ROM value with improvements noted in functional ROM and ability to perform ADLs. Appropriate and Ongoing  - Pt will demonstrate increased MedX average isometric strength value by 15% from initial test resulting in improved ability to perform bending, lifting, and carrying activities safely, confidently. Appropriate and Ongoing  - Pt will report a reduction in worst pain score by 1-2 points for improved tolerance for  transfers sit to stand. Appropriate and Ongoing  - Pt able to perform HEP correctly with minimal cueing or supervision from therapist to encourage independent management of symptoms. Appropriate and Ongoing     Long term goals: 10 weeks or 20 visits   - Pt will demonstrate increased lumbar ROM by at least 12 degrees from initial ROM value, resulting in improved ability to perform functional forward bending while standing and sitting. Appropriate and Ongoing  - Pt will demonstrate increased MedX average isometric strength value by 25% from initial test resulting in improved ability to perform bending, lifting, and carrying activities safely and confidently. Appropriate and Ongoing  - Pt to demonstrate ability to independently control and reduce their pain through posture positioning and mechanical movements throughout a typical day. Appropriate and Ongoing  - Pt will demonstrate reduced pain and improved functional outcomes as reported on the FOTO by reaching a limitation score of < or = 15% or less in order to demonstrate subjective improvement in pt's condition.   Appropriate and Ongoing  - Pt will demonstrate independence with the HEP at discharge. Appropriate and Ongoing  - Pt will be able to complete safe bending and lifting up to 10# without increased low back pain (patient goal) Appropriate and Ongoing    Plan     Continue with established Plan of Care towards established PT goals.     Therapist: Yana Valenzuela, PT  06/17/2024

## 2024-06-17 NOTE — PROGRESS NOTES
[facility]  Occupational Therapy Treatment Note    Date: 6/17/2024  Name: Manisha Wynne  Clinic Number: [id number]    Therapy Diagnosis:   1. Elbow pain, right            Medical Diagnosis: R Olecranon fracture with ORIF 2/28/24  ICD-10:   Z98.890 (ICD-10-CM) - Post-operative state   S52.021A (ICD-10-CM) - Closed fracture of olecranon process of right ulna, initial encounter         Physician: Sarmad Varghese PA-C; Dr. DESIREE Guevara   Physician Orders: Eval and Treat  R Elbow Olecranon ORIF  DOS: 2/28/24  12 Visits     Surgical Procedure and Date: 2/28/24,   Procedure(s) (LRB):  ORIF, ELBOW (Right)        Evaluation Date: 4/23/2024     Plan of Care Certification Period: 7/23/24     Date of Return to MD: 6/21/24     Visit # / Visits authorized: 9 / 12 (20 total)   Insurance Authorization Period Expiration: 12/31/24     FOTO #1: FSM 47% / GOAL 65%  FOTO #2: 5/22/24 FSM    52% / goal 65%   FOTO #3:      Precautions:  Standard and Weightbearing; Fall Rise      Time In: 9:42 am   Time Out: 10:15 am   Total Appointment Time (timed & untimed codes): 33 minutes    SUBJECTIVE   Patient casted 12 weeks, cast removed 5/10/24     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   She was compliant with home exercise program given last session.   Response to previous treatment:supination improved   Functional change: none     Pain: 4/10  Location: right elbow sore stiff      OBJECTIVE     Objective Measures updated at progress report unless specified.    Edema. Measured in centimeters.    4/23/2024 5/22/24          Proximal Wrist Crease 15.8 cm =   Proximal Palmar Crease       MCPs            UE  ROM. Measured in degrees.    4/23/2024 5/15/24 6/17/2024           Wrist ext/flex  30 / 45  =/= 43/65   Sup/pro  70 / 70 75/90 80/90           Elbow ext / flex  52/122 25/ 130 20/145                 Sensation- intact      Special Tests: NT          Strength (Dyanmometer) and Pinch  Strength (Pinch Gauge)  Measured in pounds and psi. Average of three trials.    4/23/2024 4/23/2024 6/17/2024 6/17/2024     Right  Left  Right Left   Rung II TBA TBA 34.2 41.4   Key Pinch     8 8   3pt Pinch     8.5 8   2pt Pinch     6 5     Treatment     Manisha received the treatments listed below:     Supervised Modalities after being cleared for contradictions: 10 min MH and paraffin to to R elbow and hand  x 10 min to increase blood flow, circulation and tissue elasticity prior to therex.     Manual therapy techniques: for 0 min (including Joint mobilizations, Myofacial release, Manual Lymphatic Drainage, Soft tissue Mobilization, and Friction Massage)  were applied to the R elbow  as follows:   - scar massage  to R elbow to decrease adhesions and improve tensile glide   - DTM to biceps, triceps and anti cubital fossa to decrease muscle tightness and guarding to facilitate elbow extension.   - DTM to posterior and anterior forearm.      Therapeutic Activities to improve functional performance for 23 minutes, including:  - 1# wrist flex, ext, RD, UD x 20 reps each (NT)  - 1# elbow flex, extension over towel roll in 3 positions, palm up, thumb up, palm down x 15 reps each (NT)  - 1#  hammer for sup/pro x 10 reps (NT)  - triceps extension with Yellow theraband  x 10 reps (NT)  - Yellow Putty: MP flexion with IP extension; log roll with tripod pinch; gripping; extension blooms x 10 each  - objective range of motion and strength measurements re-assessed this date.   --  Patient Education and Home Exercises      Education provided:   - Cont HEP   - Progress towards goals     Written Home Exercises Provided: Patient instructed to cont prior HEP.  Exercises were reviewed and Manisha was able to demonstrate them prior to the end of the session.  Manisha demonstrated good  understanding of the HEP provided. See EMR under Patient Instructions for exercises provided during therapy sessions.       Assessment     Elbow  extension remains limited by approximately 20 degrees but with increased range of motion for all other measurements. Good strength measurements this date with gains to be made but good functional bilateral strength. Therapy to continue to focus on end range of motion and strengthening for improved performance of functional activity.      Manisha is progressing well towards her goals and there are no updates to goals at this time. Pt prognosis is Good. Will progress with  and pinch strengthening.     Pt will continue to benefit from skilled outpatient occupational therapy to address the deficits listed in the problem list on initial evaluation provide pt/family education and to maximize pt's level of independence in the home and community environment.     Pt's spiritual, cultural and educational needs considered and pt agreeable to plan of care and goals.    Anticipated barriers to occupational therapy: none     The following goals were discussed with the patient and patient is in agreement with them as to be addressed in the treatment plan.      Short Term Goals: (6 weeks):  1)  Patient to be IND with HEP and modalities for pain management- met  2)  Increase ROM 10-20 degrees to increase functional hand use for ADLs/work/leisure activities- MET  3)   Decrease edema .1-.5mm to increase joint mobility /flexibility for functional hand use. - met  4)  Assess   and pinch and set goals accordingly MET     Long Term Goals :To be met by discharge (12 weeks)   1)  Increase  strength 2-8 lbs. For driving, cooking   2) Increase pinch strength 1-4 psi's for opening bottles and FM coordination   3)   Increase ROM 21-40 degrees to increase functional hand use for ADLs/work/leisure activities  4) Patient to score at _60_% OR MORE (FSM)  on FOTO to demonstrate improved perception of functional R hand  Use.     Plan   Certification Period/Plan of care expiration: 4/23/2024 to 7/23/24.    Santiago Adams, OT

## 2024-06-18 ENCOUNTER — OUTPATIENT CASE MANAGEMENT (OUTPATIENT)
Dept: ADMINISTRATIVE | Facility: OTHER | Age: 74
End: 2024-06-18
Payer: MEDICARE

## 2024-06-19 ENCOUNTER — HOSPITAL ENCOUNTER (OUTPATIENT)
Dept: RADIOLOGY | Facility: HOSPITAL | Age: 74
Discharge: HOME | End: 2024-06-19
Attending: PHYSICIAN ASSISTANT
Payer: MEDICARE

## 2024-06-19 ENCOUNTER — TELEPHONE (OUTPATIENT)
Dept: ORTHOPEDICS | Facility: CLINIC | Age: 74
End: 2024-06-19
Payer: MEDICARE

## 2024-06-19 DIAGNOSIS — M54.10 RADICULOPATHY: ICD-10-CM

## 2024-06-21 ENCOUNTER — OFFICE VISIT (OUTPATIENT)
Dept: ORTHOPEDICS | Facility: CLINIC | Age: 74
End: 2024-06-21
Payer: MEDICARE

## 2024-06-21 ENCOUNTER — TELEPHONE (OUTPATIENT)
Dept: PODIATRY | Facility: CLINIC | Age: 74
End: 2024-06-21
Payer: MEDICARE

## 2024-06-21 ENCOUNTER — PATIENT MESSAGE (OUTPATIENT)
Dept: NEUROLOGY | Facility: CLINIC | Age: 74
End: 2024-06-21
Payer: MEDICARE

## 2024-06-21 ENCOUNTER — OUTPATIENT CASE MANAGEMENT (OUTPATIENT)
Dept: ADMINISTRATIVE | Facility: OTHER | Age: 74
End: 2024-06-21
Payer: MEDICARE

## 2024-06-21 ENCOUNTER — HOSPITAL ENCOUNTER (OUTPATIENT)
Dept: RADIOLOGY | Facility: OTHER | Age: 74
Discharge: HOME | End: 2024-06-21
Attending: SPECIALIST/TECHNOLOGIST
Payer: MEDICARE

## 2024-06-21 VITALS — BODY MASS INDEX: 23.02 KG/M2 | HEIGHT: 68 IN | WEIGHT: 151.88 LBS

## 2024-06-21 DIAGNOSIS — G56.23 CUBITAL TUNNEL SYNDROME OF BOTH UPPER EXTREMITIES: ICD-10-CM

## 2024-06-21 DIAGNOSIS — R52 PAIN: ICD-10-CM

## 2024-06-21 DIAGNOSIS — G56.03 BILATERAL CARPAL TUNNEL SYNDROME: Primary | ICD-10-CM

## 2024-06-21 PROCEDURE — 99999 PR PBB SHADOW E&M-EST. PATIENT-LVL III: CPT | Mod: PBBFAC,HCNC,, | Performed by: SPECIALIST/TECHNOLOGIST

## 2024-06-21 PROCEDURE — 73080 X-RAY EXAM OF ELBOW: CPT | Mod: TC,HCNC,FY,RT

## 2024-06-21 PROCEDURE — 73080 X-RAY EXAM OF ELBOW: CPT | Mod: 26,HCNC,RT, | Performed by: RADIOLOGY

## 2024-06-21 NOTE — TELEPHONE ENCOUNTER
Spoke with patient and she stated that she is having thesame problems that are getting worse and will discuss that with Dr Rao on her f/u visit.

## 2024-06-21 NOTE — PROGRESS NOTES
"6/21/24  Patient reports 18 weeks status post right olecranon ORIF.  She has been attending regular therapy and has returned back to work with some moderate soreness.  She states that she still has trouble fully extending.  She states he lacks about 15° of extension.  She does note that she is some numbness and tingling within the small finger as well as the index and long finger.  She does state that this worsens with position.  She does not state she gets nighttime numbness and tingling.  She does denies any weakness, but notes that her right side is not as strong as her left which is supported by her strength testing within therapy.    5/10/24  Patient reports 12 weeks status post right olecranon fracture ORIF.  She has been attending therapy regularly.  She states that she has some pain still localized at the triceps insertion.  She notes that she still has difficulty with full extension at the elbow.  She does note that she has been doing more things and trying to use her right arm more.    4/11/24  Patient reports 6 weeks status post right olecranon fracture ORIF.  She reports she is in minimal pain.  She has been in a long-arm fiberglass cast for the past 4 weeks.  She denies any numbness or tingling.    3/14/24  Ms. Wynne is here today for a post-operative visit.  She is 14 days status post R Olecranon fracture ORIF by Dr. Moe on 2/28/24. She reports that she is we will pain.  She states she has been taking her pain medication only as needed.  She denies any numbness or tingling.  She denies fever, chills, and sweats since the time of the surgery.     Physical exam:    Vitals:    06/21/24 0850   Weight: 68.9 kg (151 lb 14.4 oz)   Height: 5' 8" (1.727 m)   PainSc: 0-No pain       Vital signs are stable, patient is afebrile.  Patient is well dressed and well groomed, no acute distress.  Alert and oriented to person, place, and time.  Cast removed and x-rays examined. Incision is clean, dry and " intact.  There is no erythema or exudate.  There is no sign of any infection. She is NVI. Able to make a composite fist.  Patient has good range of motion of the elbow.  Lacks about 10° of extension.  Negative Tinel at the carpal tunnel bilaterally.  Positive Durkan's and Phalen's bilaterally.  Positive Tinel at the cubital tunnel of the right elbow.  Negative Tinel cubital tunnel of the left elbow.    Assessment:  s/p R Olecranon fracture ORIF      Plan:  Manisha was seen today for post-op evaluation.    Diagnoses and all orders for this visit:    Bilateral carpal tunnel syndrome  -     EMG W/ ULTRASOUND AND NERVE CONDUCTION TEST 2 Extremities; Future    Cubital tunnel syndrome of both upper extremities  -     EMG W/ ULTRASOUND AND NERVE CONDUCTION TEST 2 Extremities; Future      Continue with regular therapy and home exercise program with progression of strengthening   Reviewed x-rays   We will order EMG to evaluate her numbness and tingling.    Patient we will follow up with Dr. Ortega after EMG to review those results.

## 2024-06-21 NOTE — TELEPHONE ENCOUNTER
----- Message from Patricia Rao DPM sent at 6/21/2024  8:19 AM CDT -----  Regarding: pt appt  Let patient know it has not been 61 days since her last visit I am not sure if insurance will cover repeat visit so soon.  If she is having any new issues please keep appointment if not please reschedule her for a few weeks from now

## 2024-06-24 ENCOUNTER — CLINICAL SUPPORT (OUTPATIENT)
Dept: REHABILITATION | Facility: HOSPITAL | Age: 74
End: 2024-06-24
Payer: MEDICARE

## 2024-06-24 DIAGNOSIS — M25.521 ELBOW PAIN, RIGHT: Primary | ICD-10-CM

## 2024-06-24 PROCEDURE — 97530 THERAPEUTIC ACTIVITIES: CPT | Mod: HCNC

## 2024-06-24 PROCEDURE — 97018 PARAFFIN BATH THERAPY: CPT | Mod: HCNC

## 2024-06-24 PROCEDURE — 97140 MANUAL THERAPY 1/> REGIONS: CPT | Mod: HCNC

## 2024-06-24 NOTE — PROGRESS NOTES
"[facility]  Occupational Therapy Treatment Note    Date: 6/24/2024  Name: Manisha Wynne  Clinic Number: [id number]    Therapy Diagnosis:   1. Elbow pain, right              Medical Diagnosis: R Olecranon fracture with ORIF 2/28/24  ICD-10:   Z98.890 (ICD-10-CM) - Post-operative state   S52.021A (ICD-10-CM) - Closed fracture of olecranon process of right ulna, initial encounter         Physician: Sarmad Varghese PA-C; Dr. DESIREE Guevara   Physician Orders: Eval and Treat  R Elbow Olecranon ORIF  DOS: 2/28/24  12 Visits     Surgical Procedure and Date: 2/28/24,   Procedure(s) (LRB):  ORIF, ELBOW (Right)        Evaluation Date: 4/23/2024     Plan of Care Certification Period: 7/23/24     Date of Return to MD: 6/21/24     Visit # / Visits authorized: 13 / 20   Insurance Authorization Period Expiration: 12/31/24     FOTO #1: FSM 47% / GOAL 65%  FOTO #2: 5/22/24 FSM    52% / goal 65%   FOTO #3:      Precautions:  Standard and Weightbearing; Fall Rise      Time In: 9:30 am   Time Out: 10:15 am   Total Appointment Time (timed & untimed codes): 45 minutes    SUBJECTIVE   Patient casted 12 weeks, cast removed 5/10/24     Pt reports: She has been performing rows for her healthy back program. "That feels much better," she said following STM to the arm and forearm.   She was compliant with home exercise program given last session.   Response to previous treatment:supination improved   Functional change: none     Pain: 4/10  Location: right elbow sore stiff      OBJECTIVE     Objective Measures updated at progress report unless specified.    Edema. Measured in centimeters.    4/23/2024 5/22/24          Proximal Wrist Crease 15.8 cm =   Proximal Palmar Crease       MCPs            UE  ROM. Measured in degrees.    4/23/2024 5/15/24 6/17/2024           Wrist ext/flex  30 / 45  =/= 43/65   Sup/pro  70 / 70 75/90 80/90           Elbow ext / flex  52/122 25/ 130 20/145                 Sensation- intact " "     Special Tests: NT          Strength (Dyanmometer) and Pinch Strength (Pinch Gauge)  Measured in pounds and psi. Average of three trials.    4/23/2024 4/23/2024 6/17/2024 6/17/2024     Right  Left  Right Left   Rung II TBA TBA 34.2 41.4   Key Pinch     8 8   3pt Pinch     8.5 8   2pt Pinch     6 5     Treatment     Manisha received the treatments listed below:     Supervised Modalities after being cleared for contradictions: 10 min MH and paraffin to to R elbow and hand  x 10 min to increase blood flow, circulation and tissue elasticity prior to therex.     Manual therapy techniques: for 13 min (including Joint mobilizations, Myofacial release, Manual Lymphatic Drainage, Soft tissue Mobilization, and Friction Massage)  were applied to the R elbow  as follows:   - scar massage  to R elbow to decrease adhesions and improve tensile glide (NT)  - DTM to biceps, triceps and anti cubital fossa to decrease muscle tightness and guarding to facilitate elbow extension.      Therapeutic Activities to improve functional performance for 22 minutes, including:  - 1# wrist flex, ext, RD, UD x 20 reps each   - 1# elbow flex, extension palm up x 20 reps (palm down and wrist neutral position not performed this date)  - 1#  hammer for sup/pro x 10 reps (NT due to weakness and pain)  - triceps extension with Yellow theraband  x 10 reps (NT)  - Yellow Putty: MP flexion with IP extension; log roll with tripod pinch; gripping; extension blooms x 10 each  - Biceps PNF stretch: 5 x 15"  - Wrist flexor stretch: x 30"  - passive range of motion: elbow extension x 5 reps following stretches    --  Patient Education and Home Exercises      Education provided:   - Cont HEP   - Progress towards goals     Written Home Exercises Provided: Patient instructed to cont prior HEP.  Exercises were reviewed and Manisha was able to demonstrate them prior to the end of the session.  Manisha demonstrated good  understanding of the HEP provided. See " EMR under Patient Instructions for exercises provided during therapy sessions.       Assessment     Noted tightness in the biceps, brachioradialis origin and bicipital aponeurosis area but with improved tension following manual therapy techniques. Increased tingling in the median nerve distribution during STM to the bicipital aponeurosis and pronator teres region. Good performance of new stretches this date. Abandoned supination/pronation with 1# this date due to weakness and discomfort in pronation. Therapy to continue to focus on end range of motion and strengthening for improved performance of functional activity.      Manisha is progressing well towards her goals and there are no updates to goals at this time. Pt prognosis is Good. Will progress with  and pinch strengthening.     Pt will continue to benefit from skilled outpatient occupational therapy to address the deficits listed in the problem list on initial evaluation provide pt/family education and to maximize pt's level of independence in the home and community environment.     Pt's spiritual, cultural and educational needs considered and pt agreeable to plan of care and goals.    Anticipated barriers to occupational therapy: none     The following goals were discussed with the patient and patient is in agreement with them as to be addressed in the treatment plan.      Short Term Goals: (6 weeks):  1)  Patient to be IND with HEP and modalities for pain management- met  2)  Increase ROM 10-20 degrees to increase functional hand use for ADLs/work/leisure activities- MET  3)   Decrease edema .1-.5mm to increase joint mobility /flexibility for functional hand use. - met  4)  Assess   and pinch and set goals accordingly MET     Long Term Goals :To be met by discharge (12 weeks)   1)  Increase  strength 2-8 lbs. For driving, cooking   2) Increase pinch strength 1-4 psi's for opening bottles and FM coordination   3)   Increase ROM 21-40 degrees to  increase functional hand use for ADLs/work/leisure activities  4) Patient to score at _60_% OR MORE (FSM)  on FOTO to demonstrate improved perception of functional R hand  Use.     Plan   Certification Period/Plan of care expiration: 4/23/2024 to 7/23/24.    Santiago Adams OT

## 2024-06-24 NOTE — PATIENT INSTRUCTIONS
Wrist Extensor Stretch        Keeping elbow straight, grasp left hand and slowly bend wrist forward until stretch is felt. Hold 10 seconds. Relax.  Repeat 10 times. Do 3 sessions per day.       Wrist Flexor Stretch        Keeping elbow straight, grasp left hand and slowly bend wrist back until stretch is felt. Hold 10 seconds. Relax.  Repeat 10 times.  Do 3 sessions per day.

## 2024-06-25 ENCOUNTER — HOSPITAL ENCOUNTER (EMERGENCY)
Facility: HOSPITAL | Age: 74
Discharge: HOME | End: 2024-06-25
Attending: EMERGENCY MEDICINE
Payer: MEDICARE

## 2024-06-25 ENCOUNTER — OFFICE VISIT (OUTPATIENT)
Dept: PODIATRY | Facility: CLINIC | Age: 74
End: 2024-06-25
Payer: MEDICARE

## 2024-06-25 VITALS
SYSTOLIC BLOOD PRESSURE: 174 MMHG | HEIGHT: 68 IN | HEART RATE: 60 BPM | DIASTOLIC BLOOD PRESSURE: 83 MMHG | WEIGHT: 154.56 LBS | BODY MASS INDEX: 23.43 KG/M2

## 2024-06-25 VITALS
HEART RATE: 62 BPM | WEIGHT: 151.88 LBS | BODY MASS INDEX: 23.1 KG/M2 | OXYGEN SATURATION: 99 % | RESPIRATION RATE: 18 BRPM | DIASTOLIC BLOOD PRESSURE: 71 MMHG | SYSTOLIC BLOOD PRESSURE: 142 MMHG | TEMPERATURE: 98 F
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
## 2024-06-25 DIAGNOSIS — G60.8 SENSORY PERIPHERAL NEUROPATHY: ICD-10-CM

## 2024-06-25 DIAGNOSIS — S01.01XA LACERATION OF SCALP, INITIAL ENCOUNTER: ICD-10-CM

## 2024-06-25 DIAGNOSIS — L84 PRE-ULCERATIVE CALLUSES: Primary | ICD-10-CM

## 2024-06-25 LAB
OHS QRS DURATION: 148 MS
OHS QTC CALCULATION: 460 MS

## 2024-06-25 PROCEDURE — 12002 RPR S/N/AX/GEN/TRNK2.6-7.5CM: CPT | Mod: HCNC

## 2024-06-25 PROCEDURE — 1157F ADVNC CARE PLAN IN RCRD: CPT | Mod: HCNC,CPTII,S$GLB, | Performed by: PODIATRIST

## 2024-06-25 PROCEDURE — 93010 ELECTROCARDIOGRAM REPORT: CPT | Mod: HCNC,,, | Performed by: INTERNAL MEDICINE

## 2024-06-25 PROCEDURE — 99284 EMERGENCY DEPT VISIT MOD MDM: CPT | Mod: 25,HCNC

## 2024-06-25 PROCEDURE — 99999 PR PBB SHADOW E&M-EST. PATIENT-LVL III: CPT | Mod: PBBFAC,HCNC,, | Performed by: PODIATRIST

## 2024-06-25 PROCEDURE — 99214 OFFICE O/P EST MOD 30 MIN: CPT | Mod: HCNC,S$GLB,, | Performed by: PODIATRIST

## 2024-06-25 PROCEDURE — 93005 ELECTROCARDIOGRAM TRACING: CPT | Mod: HCNC

## 2024-06-25 PROCEDURE — 1126F AMNT PAIN NOTED NONE PRSNT: CPT | Mod: HCNC,CPTII,S$GLB, | Performed by: PODIATRIST

## 2024-06-25 PROCEDURE — 3079F DIAST BP 80-89 MM HG: CPT | Mod: HCNC,CPTII,S$GLB, | Performed by: PODIATRIST

## 2024-06-25 PROCEDURE — 3077F SYST BP >= 140 MM HG: CPT | Mod: HCNC,CPTII,S$GLB, | Performed by: PODIATRIST

## 2024-06-25 PROCEDURE — 1160F RVW MEDS BY RX/DR IN RCRD: CPT | Mod: HCNC,CPTII,S$GLB, | Performed by: PODIATRIST

## 2024-06-25 PROCEDURE — 25000003 PHARM REV CODE 250: Mod: HCNC

## 2024-06-25 PROCEDURE — 1159F MED LIST DOCD IN RCRD: CPT | Mod: HCNC,CPTII,S$GLB, | Performed by: PODIATRIST

## 2024-06-25 RX ORDER — LIDOCAINE HYDROCHLORIDE 10 MG/ML
10 INJECTION, SOLUTION EPIDURAL; INFILTRATION; INTRACAUDAL; PERINEURAL
Status: COMPLETED | OUTPATIENT
Start: 2024-06-25 | End: 2024-06-25

## 2024-06-25 RX ORDER — ACETAMINOPHEN 500 MG
1000 TABLET ORAL
Status: COMPLETED | OUTPATIENT
Start: 2024-06-25 | End: 2024-06-25

## 2024-06-25 RX ADMIN — ACETAMINOPHEN 1000 MG: 500 TABLET ORAL at 03:06

## 2024-06-25 RX ADMIN — LIDOCAINE HYDROCHLORIDE 100 MG: 10 INJECTION, SOLUTION EPIDURAL; INFILTRATION; INTRACAUDAL at 03:06

## 2024-06-25 NOTE — PROGRESS NOTES
Subjective:      Patient ID: Manisha Wynne is a 73 y.o. female.    Chief Complaint:   Follow-up (Bilateral 2nd toes/PCP- 05/03/2024/Iris Blue MD)    Manisha is a 73 y.o. female who presents to the clinic for evaluation and treatment of feet  Manisha has a past medical history of Abnormal Pap smear, Atrial fibrillation, AV block, 1st degree (07/25/2012), C. difficile diarrhea, Cataract, Depression (07/24/2012), Facet arthritis of lumbar region (03/31/2015), Falls, Hyperlipidemia, Hypertension (07/24/2012), Neuropathy, Other specified cardiac dysrhythmias(427.89), Sleep apnea, Syncope (07/24/2012), and Tremors of nervous system.  .  This patient has documented high risk feet requiring routine maintenance secondary to peripheral neuropathy.    Patient returns to clinic for foot evaluation/c/o pain  Patient discusses had another significant fall  Did not injure her feet  She would like to pursue the tenotomy once she finds out why she is falling      PCP: Iris Blue MD    Date Last Seen by PCP 5/3/24    Current shoe gear:  Affected Foot : bacilio Snyder        Hemoglobin A1C   Date Value Ref Range Status   11/16/2023 5.5 4.0 - 5.6 % Final     Comment:     ADA Screening Guidelines:  5.7-6.4%  Consistent with prediabetes  >or=6.5%  Consistent with diabetes    High levels of fetal hemoglobin interfere with the HbA1C  assay. Heterozygous hemoglobin variants (HbS, HgC, etc)do  not significantly interfere with this assay.   However, presence of multiple variants may affect accuracy.     08/05/2022 5.3 4.0 - 5.6 % Final     Comment:     ADA Screening Guidelines:  5.7-6.4%  Consistent with prediabetes  >or=6.5%  Consistent with diabetes    High levels of fetal hemoglobin interfere with the HbA1C  assay. Heterozygous hemoglobin variants (HbS, HgC, etc)do  not significantly interfere with this assay.   However, presence of multiple variants may affect accuracy.     06/10/2021 5.4 4.0 - 5.6 % Final      Comment:     ADA Screening Guidelines:  5.7-6.4%  Consistent with prediabetes  >or=6.5%  Consistent with diabetes    High levels of fetal hemoglobin interfere with the HbA1C  assay. Heterozygous hemoglobin variants (HbS, HgC, etc)do  not significantly interfere with this assay.   However, presence of multiple variants may affect accuracy.     06/10/2021 5.4 4.0 - 5.6 % Final     Comment:     ADA Screening Guidelines:  5.7-6.4%  Consistent with prediabetes  >or=6.5%  Consistent with diabetes    High levels of fetal hemoglobin interfere with the HbA1C  assay. Heterozygous hemoglobin variants (HbS, HgC, etc)do  not significantly interfere with this assay.   However, presence of multiple variants may affect accuracy.          Past Medical History:   Diagnosis Date    Abnormal Pap smear     Atrial fibrillation     AV block, 1st degree 07/25/2012    C. difficile diarrhea     RESOLVED    Cataract     Depression 07/24/2012    Facet arthritis of lumbar region 03/31/2015    Falls     3 falls in the last 6 mos--noted 6/19/19    Hyperlipidemia     Hypertension 07/24/2012    Neuropathy     Other specified cardiac dysrhythmias(427.89)     Sleep apnea     Syncope 07/24/2012    Tremors of nervous system     hands bilaterally     Past Surgical History:   Procedure Laterality Date    APPLICATION OF CARTILAGE GRAFT Bilateral 12/19/2019    Procedure: APPLICATION, CARTILAGE GRAFT AURICULAR JEZ;  Surgeon: Martinez Flores III, MD;  Location: [facility];  Service: ENT;  Laterality: Bilateral;    CARDIAC PACEMAKER PLACEMENT  09/07/2012    [id number]    CATARACT EXTRACTION W/  INTRAOCULAR LENS IMPLANT Right 5/16/2022    Procedure: EXTRACTION, CATARACT, WITH IOL INSERTION;  Surgeon: Ines Aguilera MD;  Location: [facility];  Service: Ophthalmology;  Laterality: Right;  Catalys     CATARACT EXTRACTION W/  INTRAOCULAR LENS IMPLANT Left 6/20/2022    Procedure: EXTRACTION, CATARACT, WITH IOL INSERTION;  Surgeon: Ines Aguilera MD;   Location: [facility] OR;  Service: Ophthalmology;  Laterality: Left;  Catalys     DILATION AND CURETTAGE OF UTERUS      Hx of precancerous cells?    ENDOSCOPIC ULTRASOUND OF UPPER GASTROINTESTINAL TRACT N/A 7/5/2019    Procedure: ULTRASOUND, UPPER GI TRACT, ENDOSCOPIC;  Surgeon: Kev Calderon MD;  Location: [facility] ENDO (2ND FLR);  Service: Endoscopy;  Laterality: N/A;  Pacemaker-Adam   appt confirmed-rb    MYELOGRAPHY N/A 5/4/2021    Procedure: Myelogram  CERVICAL, THORACIC AND LUMBAR;  Surgeon: [facility] Provider;  Location: [facility] OR 2ND FLR;  Service: Radiology;  Laterality: N/A;    NASAL SEPTOPLASTY N/A 12/19/2019    Procedure: SEPTOPLASTY, NOSE;  Surgeon: Martinez Flores III, MD;  Location: [facility] OR;  Service: ENT;  Laterality: N/A;  FOLLOW DR SALVATORE KIMBROUGH PROTOCOL    OPEN REDUCTION AND INTERNAL FIXATION (ORIF) OF FRACTURE OF DISTAL RADIUS Left 1/24/2020    Procedure: ORIF, FRACTURE, RADIUS, DISTAL-left;  Surgeon: Ellen Moe MD;  Location: [facility] OR;  Service: Orthopedics;  Laterality: Left;    OPEN REDUCTION AND INTERNAL FIXATION (ORIF) OF INJURY OF ELBOW Right 2/28/2024    Procedure: ORIF, ELBOW;  Surgeon: Ellen Moe MD;  Location: [facility] OR;  Service: Orthopedics;  Laterality: Right;    POSTERIOR FUSION OF CERVICAL SPINE WITH LAMINECTOMY N/A 11/14/2022    Procedure: LAMINECTOMY, SPINE, CERVICAL, WITH POSTERIOR FUSION C3-C6;  Surgeon: Nicolette Cheek MD;  Location: [facility] 2ND FLR;  Service: Neurosurgery;  Laterality: N/A;  TORONTO III, ASA III, BLOOD TYPE AND SCREEN, NEUROMONITORING EMG SEP MEP, BRACE/HALO [place], BED REGULAR BED, HEADREST [place], POSITION PRONE, SPECIAL EQUIPMENT NIKI MIDDLETON, RADIOLOGY C-ARM, EXT. BONE STIMULATOR BIOMET    REPLACEMENT OF PACEMAKER GENERATOR Left 10/7/2022    Procedure: REPLACEMENT, PACEMAKER GENERATOR;  Surgeon: John Sheets MD;  Location: [facility] EP LAB;  Service: Cardiology;  Laterality: Left;  YAS, SSS, AVB, Dual PPM Gen Change,SJM, MAC ,FL, 3  prep,** Adam dcPPM in situ**    SURGICAL REMOVAL OF NASAL TURBINATE Bilateral 12/19/2019    Procedure: EXCISION, NASAL TURBINATE;  Surgeon: Martinez Flores III, MD;  Location: [facility];  Service: ENT;  Laterality: Bilateral;    TONSILLECTOMY      WISDOM TOOTH EXTRACTION       Current Outpatient Medications on File Prior to Visit   Medication Sig Dispense Refill    acetaminophen (TYLENOL) 500 MG tablet Take 2 tablets (1,000 mg total) by mouth 2 (two) times daily as needed for Pain. Begin post op day 3. 50 tablet 0    buPROPion (WELLBUTRIN XL) 300 MG 24 hr tablet Take 1 tablet by mouth once daily.      buPROPion (WELLBUTRIN XL) 300 MG 24 hr tablet Take 1 tablet by mouth once daily.      denosumab (PROLIA) 60 mg/mL Syrg 60 mL MONTHLY (route: subcutaneous)      FLUoxetine 60 mg Tab Take 1 tablet by mouth once daily.      gabapentin (NEURONTIN) 100 MG capsule Two at bedtime - weaning off      ibuprofen (ADVIL,MOTRIN) 600 MG tablet Take 1 tablet (600 mg total) by mouth 3 (three) times daily as needed for Pain. Bedside Delivery 45 tablet 0    lisdexamfetamine (VYVANSE) 70 MG capsule Take 70 mg by mouth every morning.      losartan (COZAAR) 25 MG tablet TAKE 1 TABLET ONE TIME DAILY 90 tablet 3    melatonin (MELATIN) 5 mg Take 1 tablet by mouth every evening. (For insomnia)      melatonin 5 mg Cap One at bedtime      methocarbamoL (ROBAXIN) 500 MG Tab One - two daily as needed for back pain 40 tablet 0    methyl salicylate-menthol 15-10% 15-10 % Crea Apply topically every evening. 113 g 0    pravastatin (PRAVACHOL) 40 MG tablet Take 1 tablet (40 mg total) by mouth once daily. 90 tablet 1    pravastatin (PRAVACHOL) 40 MG tablet Take 1 tablet by mouth once daily.      PROLIA 60 mg/mL Syrg Inject 60 mg into the skin every 6 (six) months.      propranoloL (INDERAL) 20 MG tablet TAKE 1 TABLET TWICE DAILY 180 tablet 3    aspirin (ECOTRIN) 81 MG EC tablet Take 1 tablet (81 mg total) by mouth 2 (two) times a day. 60 tablet 0     No  "current facility-administered medications on file prior to visit.     Review of patient's allergies indicates:  No Known Allergies    Review of Systems   Constitutional: Negative for chills, decreased appetite, fever, malaise/fatigue, night sweats, weight gain and weight loss.   Eyes:         Poor eyesight   Cardiovascular:  Negative for chest pain, claudication, dyspnea on exertion, leg swelling, palpitations and syncope.   Respiratory:  Negative for cough and shortness of breath.    Endocrine: Negative for cold intolerance and heat intolerance.   Hematologic/Lymphatic: Negative for bleeding problem. Does not bruise/bleed easily.   Skin:  Positive for nail changes. Negative for color change, dry skin, flushing, itching, poor wound healing, rash, skin cancer, suspicious lesions and unusual hair distribution.   Musculoskeletal:  Positive for arthritis, falls and stiffness. Negative for back pain, gout, joint pain, joint swelling, muscle cramps, muscle weakness, myalgias and neck pain.   Gastrointestinal:  Negative for diarrhea, nausea and vomiting.   Neurological:  Positive for numbness and paresthesias. Negative for dizziness, focal weakness, light-headedness, tremors, vertigo and weakness.   Psychiatric/Behavioral:  Negative for altered mental status and depression. The patient does not have insomnia.    Allergic/Immunologic: Negative.            Objective:       Vitals:    06/25/24 1412   BP: (!) 174/83   Pulse: 60   Weight: 70.1 kg (154 lb 8.7 oz)   Height: 5' 8" (1.727 m)   PainSc: 0-No pain   70.1 kg (154 lb 8.7 oz) afeb    Physical Exam  Vitals reviewed.   Constitutional:       General: She is not in acute distress.     Appearance: She is well-developed. She is not ill-appearing, toxic-appearing or diaphoretic.      Comments: Proper supportive shoegear   Cardiovascular:      Pulses:           Dorsalis pedis pulses are 2+ on the right side and 2+ on the left side.        Posterior tibial pulses are 1+ on the " right side and 1+ on the left side.      Comments: No edema to digits  Musculoskeletal:         General: Deformity present.      Right lower leg: No edema.      Left lower leg: No edema.      Right foot: Decreased range of motion. Deformity and prominent metatarsal heads present. No tenderness or bony tenderness.      Left foot: Decreased range of motion. Deformity and prominent metatarsal heads present. No tenderness or bony tenderness.      Comments:    Semi- Reducible extensor and flexor contractures at the MTPJ and PIPJ of toes 2-5, bilat.       No pop      Feet:      Right foot:      Protective Sensation: 10 sites tested.  0 sites sensed.      Skin integrity: No ulcer, blister, skin breakdown, erythema, warmth, callus or dry skin.      Toenail Condition: Right toenails are abnormally thick.      Left foot:      Protective Sensation: 10 sites tested.  0 sites sensed.      Skin integrity: Skin breakdown present. No blister, erythema, warmth, callus or dry skin.      Toenail Condition: Left toenails are abnormally thick.      Comments:  No soi      hpk right, 2nd and 3rd toe subungal distal   Pre-ulcer          Skin:     General: Skin is warm and dry.      Capillary Refill: Capillary refill takes 2 to 3 seconds.      Coloration: Skin is not pale.      Findings: No erythema or rash.      Nails: There is clubbing.      Comments:          Neurological:      Mental Status: She is alert and oriented to person, place, and time.      Sensory: Sensory deficit present.      Motor: Atrophy present.      Gait: Gait abnormal.   Psychiatric:         Attention and Perception: Attention normal.         Mood and Affect: Mood normal.         Speech: Speech normal.         Thought Content: Thought content normal.         Cognition and Memory: Cognition normal.         Judgment: Judgment normal.                    Assessment:       Encounter Diagnoses   Name Primary?    Pre-ulcerative calluses Yes    Sensory peripheral neuropathy                             Plan:       Manisha was seen today for follow-up.    Diagnoses and all orders for this visit:    Pre-ulcerative calluses    Sensory peripheral neuropathy          I counseled the patient on her conditions, their implications and medical management.         - Shoe inspection. Patient instructed on proper foot hygeine. We discussed wearing proper shoe gear, daily foot inspections, never walking without protective shoe gear, never putting sharp instruments to feet, routine podiatric nail visits every 2-3 months.         No indication of foot injury from the fall    Patient doing okay however she would like to pursue the tenotomy with Dr. Ho for the other toes    Schedule appointment in a few months

## 2024-06-25 NOTE — ED TRIAGE NOTES
"Manisha Wynne, a 73 y.o. female presents to the ED w/ complaint of mechanical trip and fall. Pt states she was washing dishes, not sure what she tripped on but stumbled and fell backwards onto her head. Pt denies LOC, takes baby aspirin daily. Pt denies any complaints at this time, C collar placed. Pt oriented x4.     Triage note:  Chief Complaint   Patient presents with    Fall     Pt fall and hit back of head.  Pt with lac to back of head.  Pt says she got "wobbly" while walking.  Pt denies LOC and blood thinners.       Review of patient's allergies indicates:  No Known Allergies  Past Medical History:   Diagnosis Date    Abnormal Pap smear     Atrial fibrillation     AV block, 1st degree 07/25/2012    C. difficile diarrhea     RESOLVED    Cataract     Depression 07/24/2012    Facet arthritis of lumbar region 03/31/2015    Falls     3 falls in the last 6 mos--noted 6/19/19    Hyperlipidemia     Hypertension 07/24/2012    Neuropathy     Other specified cardiac dysrhythmias(427.89)     Sleep apnea     Syncope 07/24/2012    Tremors of nervous system     hands bilaterally       "

## 2024-06-25 NOTE — ED PROVIDER NOTES
"Encounter Date: 6/25/2024       History     Chief Complaint   Patient presents with    Fall     Pt fall and hit back of head.  Pt with lac to back of head.  Pt says she got "wobbly" while walking.  Pt denies LOC and blood thinners.       73-year-old female with past medical history atrial fibrillation, hypertension, hyperlipidemia, spinal canal stenosis presents for evaluation of fall occurring tonight.  Patient states she was walking in her house carrying a plate when her legs when "wobbly" and she fell, striking the back of her head on a chair.  Patient did not lose consciousness and she has not on blood thinners.  Prior to falling, patient denies any sensations of dizziness, lightheadedness, nausea/vomiting, chest pain, shortness of breath, any other symptoms.  Patient normally walks with a cane, but does not use the cane in her home.  On arrival in ED, patient is reporting head pain with a laceration to the back of her head.    The history is provided by the patient.     Review of patient's allergies indicates:  No Known Allergies  Past Medical History:   Diagnosis Date    Abnormal Pap smear     Atrial fibrillation     AV block, 1st degree 07/25/2012    C. difficile diarrhea     RESOLVED    Cataract     Depression 07/24/2012    Facet arthritis of lumbar region 03/31/2015    Falls     3 falls in the last 6 mos--noted 6/19/19    Hyperlipidemia     Hypertension 07/24/2012    Neuropathy     Other specified cardiac dysrhythmias(427.89)     Sleep apnea     Syncope 07/24/2012    Tremors of nervous system     hands bilaterally     Past Surgical History:   Procedure Laterality Date    APPLICATION OF CARTILAGE GRAFT Bilateral 12/19/2019    Procedure: APPLICATION, CARTILAGE GRAFT AURICULAR JEZ;  Surgeon: Martinez Flores III, MD;  Location: [facility];  Service: ENT;  Laterality: Bilateral;    CARDIAC PACEMAKER PLACEMENT  09/07/2012    [id number]    CATARACT EXTRACTION W/  INTRAOCULAR LENS IMPLANT Right " 5/16/2022    Procedure: EXTRACTION, CATARACT, WITH IOL INSERTION;  Surgeon: Ines Aguilera MD;  Location: [facility] [place], operated by [facility] OR;  Service: Ophthalmology;  Laterality: Right;  Catalys     CATARACT EXTRACTION W/  INTRAOCULAR LENS IMPLANT Left 6/20/2022    Procedure: EXTRACTION, CATARACT, WITH IOL INSERTION;  Surgeon: Ines Aguilera MD;  Location: [facility];  Service: Ophthalmology;  Laterality: Left;  Catalys     DILATION AND CURETTAGE OF UTERUS      Hx of precancerous cells?    ENDOSCOPIC ULTRASOUND OF UPPER GASTROINTESTINAL TRACT N/A 7/5/2019    Procedure: ULTRASOUND, UPPER GI TRACT, ENDOSCOPIC;  Surgeon: Kev Calderon MD;  Location: [facility] ENDO (2ND FLR);  Service: Endoscopy;  Laterality: N/A;  Pacemaker-Adam   appt confirmed-rb    MYELOGRAPHY N/A 5/4/2021    Procedure: Myelogram  CERVICAL, THORACIC AND LUMBAR;  [facility];  Location: [facility] 2ND FLR;  Service: Radiology;  Laterality: N/A;    NASAL SEPTOPLASTY N/A 12/19/2019    Procedure: SEPTOPLASTY, NOSE;  Surgeon: Martinez Flores III, MD;  Location: [facility];  Service: ENT;  Laterality: N/A;  FOLLOW DR SALVATORE KIMBROUGH PROTOCOL    OPEN REDUCTION AND INTERNAL FIXATION (ORIF) OF FRACTURE OF DISTAL RADIUS Left 1/24/2020    Procedure: ORIF, FRACTURE, RADIUS, DISTAL-left;  Surgeon: Ellen Moe MD;  Location: [facility];  Service: Orthopedics;  Laterality: Left;    OPEN REDUCTION AND INTERNAL FIXATION (ORIF) OF INJURY OF ELBOW Right 2/28/2024    Procedure: ORIF, ELBOW;  Surgeon: Ellen Moe MD;  Location: [facility];  Service: Orthopedics;  Laterality: Right;    POSTERIOR FUSION OF CERVICAL SPINE WITH LAMINECTOMY N/A 11/14/2022    Procedure: LAMINECTOMY, SPINE, CERVICAL, WITH POSTERIOR FUSION C3-C6;  Surgeon: Nicolette Cheek MD;  Location: [facility] 2ND FLR;  Service: Neurosurgery;  Laterality: N/A;  TORONTO III, ASA III, BLOOD TYPE AND SCREEN, NEUROMONITORING EMG SEP MEP, BRACE/HALO Robinson, BED REGULAR BED, HEADREST MARINO, POSITION PRONE, SPECIAL  EQUIPMENT NIKI MIDDLETON, RADIOLOGY C-ARM, EXT. BONE STIMULATOR BIOMET    REPLACEMENT OF PACEMAKER GENERATOR Left 10/7/2022    Procedure: REPLACEMENT, PACEMAKER GENERATOR;  Surgeon: John Sheets MD;  Location: [facility] EP LAB;  Service: Cardiology;  Laterality: Left;  YAS, SSS, AVB, Dual PPM Gen Change,SJM, MAC ,MO, 3 prep,** Adam dcPPM in situ**    SURGICAL REMOVAL OF NASAL TURBINATE Bilateral 2019    Procedure: EXCISION, NASAL TURBINATE;  Surgeon: Martinez Flores III, MD;  Location: [facility] OR;  Service: ENT;  Laterality: Bilateral;    TONSILLECTOMY      WISDOM TOOTH EXTRACTION       Family History   Adopted: Yes   Problem Relation Name Age of Onset    Amblyopia Neg Hx      Blindness Neg Hx      Cataracts Neg Hx      Glaucoma Neg Hx      Macular degeneration Neg Hx      Retinal detachment Neg Hx       Social History     Tobacco Use    Smoking status: Former     Current packs/day: 0.00     Average packs/day: 0.3 packs/day for 15.0 years (3.8 ttl pk-yrs)     Types: Cigarettes     Start date: 1967     Quit date: 1982     Years since quittin.9    Smokeless tobacco: Former   Substance Use Topics    Alcohol use: Yes     Comment: no daily or heavy use, ~4 times per month    Drug use: No         Physical Exam     Initial Vitals [24 0022]   BP Pulse Resp Temp SpO2   136/79 61 16 98.1 °F (36.7 °C) 99 %      MAP       --         Physical Exam    Nursing note and vitals reviewed.  Constitutional: She appears well-developed and well-nourished.   HENT:   Head: Head is without raccoon's eyes and without Woods's sign.   Hematoma and laceration over right occipital region.  Laceration measuring approximately 3 cm.  Oozing blood, but bleeding controlled   Eyes: Conjunctivae and EOM are normal. Pupils are equal, round, and reactive to light.   Cardiovascular:  Normal rate, regular rhythm and normal heart sounds.           Pulmonary/Chest: Breath sounds normal. No respiratory distress.   Abdominal:  Abdomen is soft. There is no abdominal tenderness.   Musculoskeletal:      Cervical back: No spinous process tenderness or muscular tenderness.      Comments: Chronic deformity of right elbow noted.  No tenderness to palpation of shoulders, arms, elbows, wrists, hips, knees, ankles, legs bilaterally  No spinal or paraspinal tenderness to palpation at the C- T- and L-spine     Neurological: She is alert and oriented to person, place, and time. She has normal strength. No cranial nerve deficit. GCS score is 15. GCS eye subscore is 4. GCS verbal subscore is 5. GCS motor subscore is 6.         ED Course   Lac Repair    Date/Time: 6/25/2024 5:31 AM    Performed by: Mehrdad More MD  Authorized by: Danyell England MD    Consent:     Consent obtained:  Verbal    Consent given by:  Patient    Risks, benefits, and alternatives were discussed: yes      Risks discussed:  Infection, pain, need for additional repair and poor cosmetic result    Alternatives discussed:  No treatment  Universal protocol:     Procedure explained and questions answered to patient or proxy's satisfaction: yes      Patient identity confirmed:  Verbally with patient  Anesthesia:     Anesthesia method:  Local infiltration    Local anesthetic:  Lidocaine 1% w/o epi  Laceration details:     Location:  Scalp    Scalp location:  R parietal    Length (cm):  3  Exploration:     Limited defect created (wound extended): no      Hemostasis achieved with:  Direct pressure    Wound exploration: wound explored through full range of motion and entire depth of wound visualized      Contaminated: no    Treatment:     Area cleansed with:  Saline    Amount of cleaning:  Standard    Irrigation solution:  Sterile saline    Irrigation method:  Pressure wash    Debridement:  None    Undermining:  None    Scar revision: no    Skin repair:     Repair method:  Staples    Number of staples:  5  Approximation:     Approximation:  Close  Repair type:     Repair type:   Simple  Post-procedure details:     Dressing:  Open (no dressing)    Procedure completion:  Tolerated well, no immediate complications    Labs Reviewed - No data to display  EKG Readings: (Independently Interpreted)   Initial Reading: No STEMI. Previous EKG: Compared with most recent EKG Previous EKG Date: 02/22/2024. Rhythm: Normal Sinus Rhythm. Heart Rate: 60. Conduction: 1st Degree AV Block and RBBB. Axis: Normal.       Imaging Results              CT Cervical Spine Without Contrast (Final result)  Result time 06/25/24 03:20:02      Final result by Jose Francisco Guzman MD (06/25/24 03:20:02)                   Impression:      No acute cervical fracture.    Similar postoperative and degenerative changes when compared with 02/22/2024.      Electronically signed by: Jose Francisco Guzman MD  Date:    06/25/2024  Time:    03:20               Narrative:    EXAMINATION:  CT CERVICAL SPINE WITHOUT CONTRAST    CLINICAL HISTORY:  Neck trauma (Age >= 65y);    TECHNIQUE:  Low dose axial images, sagittal and coronal reformations were performed though the cervical spine.  Contrast was not administered.    COMPARISON:  02/22/2024.    FINDINGS:    Minimal anterolisthesis of C3 on C4 and C4 on C5.  Sagittal alignment is similar to the prior study.  Vertebral body heights are relatively well maintained.  Degenerative changes throughout the spine with severe disc space height loss at C5-C6.  Moderate disc space height loss at C4-C5 and C6-C7.  Extensive operative changes related to posterior instrumented fusion at C3-C6.  Multilevel laminectomy.  Small volume air in the central canal potentially from vacuum phenomena.  Similar finding was present on the prior study.  No severe central canal stenosis allowing for extensive streak artifact and postoperative changes.  Variable multilevel neural foraminal narrowing.  No acute fracture identified.  Prevertebral soft tissues are normal.  Lung apices are clear.                                        CT Head Without Contrast (Final result)  Result time 06/25/24 03:00:52      Final result by Jose Francisco Guzman MD (06/25/24 03:00:52)                   Impression:      No CT evidence of acute intracranial abnormality.  Right posterior scalp soft tissue injury.    Similar intracranial findings when compared with 02/22/2024.      Electronically signed by: Jose Francisco Guzman MD  Date:    06/25/2024  Time:    03:00               Narrative:    EXAMINATION:  CT HEAD WITHOUT CONTRAST    CLINICAL HISTORY:  Head trauma, minor (Age >= 65y);    TECHNIQUE:  Low dose axial images were obtained through the head.  Coronal and sagittal reformations were also performed. Contrast was not administered.    COMPARISON:  CT head, 02/22/2024.    FINDINGS:  Similar small focal region of encephalomalacia in the anterior inferior right frontal lobe likely from remote trauma or other remote insult.  Mild encephalomalacia in the bilateral insular region.    No evidence of acute territorial infarct, hemorrhage, mass effect, or midline shift.    Ventricles are normal in size and configuration.    No displaced calvarial fracture.  Right posterior scalp hematoma and subcutaneous emphysema suggestive of laceration injury.    Trace fluid in the right mastoid air cells.  Otherwise, the visualized paranasal sinuses and mastoid air cells are essentially clear.  No air-fluid levels.  Chronic appearing deformity of the nasal bone.                                       Medications   acetaminophen tablet 1,000 mg (1,000 mg Oral Given 6/25/24 9833)   LIDOcaine (PF) 10 mg/ml (1%) injection 100 mg (100 mg Infiltration Given by Provider 6/25/24 8911)     Medical Decision Making  73-year-old female with past medical history atrial fibrillation, hypertension, hyperlipidemia, spinal canal stenosis presents for evaluation of fall occurring tonight.    Pathologies I have considered my differential diagnosis include, but are not limited to, scalp contusion,  laceration, skull fracture, intracranial hemorrhage, C-spine fracture, stroke.    No evidence of fracture, stroke, intracranial hemorrhage on imaging.    Scalp laceration cleaned and closed according to procedure note.  Patient tolerated procedure well without immediate complication.  Patient is stable for discharge.  Return precautions given.  Patient is comfortable with plan.  Answered all questions.    Amount and/or Complexity of Data Reviewed  Radiology: ordered.    Risk  OTC drugs.  Prescription drug management.              Attending Attestation:   Physician Attestation Statement for Resident:  As the supervising MD   Physician Attestation Statement: I have personally seen and examined this patient.   I agree with the above history.  -:   As the supervising MD I agree with the above PE.     As the supervising MD I agree with the above treatment, course, plan, and disposition.   I was personally present during the critical portions of the procedure(s) performed by the resident and was immediately available in the ED to provide services and assistance as needed during the entire procedure.  I have reviewed and agree with the residents interpretation of the following: EKG and CT scans.  I have reviewed the following: old records at this facility.                                       Clinical Impression:  Final diagnoses:  [W19.XXXA] Fall (Primary)  [S01.01XA] Laceration of scalp, initial encounter          ED Disposition Condition    Discharge Stable          ED Prescriptions    None       Follow-up Information       Follow up With Specialties Details Why Contact Info    Iris Blue MD Internal Medicine Schedule an appointment as soon as possible for a visit   [address]  [place] [id number] [phone]      [facility] -  Schedule an appointment as soon as possible for a visit   [address] [facility] [id number] [phone]               Mehrdad More  MD  Resident  06/25/24 [phone]       Mehrdad More MD  Resident  06/25/24 0656       Danyell England MD  06/25/24 [phone]

## 2024-06-26 ENCOUNTER — TELEPHONE (OUTPATIENT)
Dept: INTERNAL MEDICINE | Facility: CLINIC | Age: 74
End: 2024-06-26
Payer: MEDICARE

## 2024-06-26 ENCOUNTER — PATIENT OUTREACH (OUTPATIENT)
Dept: EMERGENCY MEDICINE | Facility: HOSPITAL | Age: 74
End: 2024-06-26
Payer: MEDICARE

## 2024-06-26 NOTE — TELEPHONE ENCOUNTER
----- Message -----   From: Manisha Wynne   Sent: 6/26/2024   1:22 PM CDT   To: [facility] Clinical Support   Subject: Appointment Request                                Appointment Request From: Manisha Wynne      With Provider: Iris Blue [[facility] Internal Medicine]      Preferred Date Range: 7/1/2024 - 7/10/2024      Preferred Times: Any Time      Reason for visit: Office Visit      Comments:   Stitches out of head injury - Follow up from ED visit on 6/24-25/2024

## 2024-06-26 NOTE — PROGRESS NOTES
Successfully left a message letting patient know I was following up with her from her recent er visit and if she needed any assistance with anything to give me a call.    [facility]  Ed Navigator

## 2024-07-01 ENCOUNTER — CLINICAL SUPPORT (OUTPATIENT)
Dept: REHABILITATION | Facility: OTHER | Age: 74
End: 2024-07-01
Payer: MEDICARE

## 2024-07-01 DIAGNOSIS — R68.89 DECREASED FUNCTIONAL ACTIVITY TOLERANCE: Primary | ICD-10-CM

## 2024-07-01 DIAGNOSIS — R29.898 DECREASED STRENGTH OF TRUNK AND BACK: ICD-10-CM

## 2024-07-01 DIAGNOSIS — R26.89 BALANCE PROBLEM: ICD-10-CM

## 2024-07-01 PROCEDURE — 97112 NEUROMUSCULAR REEDUCATION: CPT | Mod: HCNC,CQ

## 2024-07-01 PROCEDURE — 97110 THERAPEUTIC EXERCISES: CPT | Mod: HCNC,CQ

## 2024-07-01 NOTE — PROGRESS NOTES
[facility]  Physical Therapy Treatment Note   Name: Manisha Wynne  Clinic Number: [id number]    Therapy Diagnosis:   Encounter Diagnoses   Name Primary?    Decreased functional activity tolerance Yes    Decreased strength of trunk and back     Balance problem            Physician: Nicolette Cheek MD    Visit Date: 7/1/2024    Physician Orders: PT Eval and Treat  Medical Diagnosis from Referral:   1. Decreased strength of trunk and back    2. Back pain, unspecified back location, unspecified back pain laterality, unspecified chronicity    3. Balance problem    4. Decreased functional activity tolerance       Evaluation Date: 4/24/2024  Authorization Period Expiration: 2/21/2025  Plan of Care Expiration: 7/24/2024  Reassessment Due: 5/24/2024  Visit # / Visits authorized: 12/20      Time In: 2:00 pm  Time Out: 3:00 pm   Total Billable Time: 30 minutes  INSURANCE and OUTCOMES: Fee for Service with FOTO Outcomes 1/3     Precautions: standard, fall, R elbow fracture     Pattern of pain determined: 1 Movement responder    MedX Testing:MedX testing visit 1  Subjective   Manisha Wynne reports a week laspe in Rx after falling and hitting her head, resulting in ER visit with staples applied for closure. Since accident, she mainly c/o intermittent lumbar pain on the R side.     Patient reports tolerating previous visit well.   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  Leisure: used to walk with a friend,       Prior Level of Function: I with increased time  Current Level of Function: Difficulty with cleaning  DME owned/used: Cane, shower chair, hand held shower, grab bars  Gym Membership: No    Pt goals: Pts goals: Get  "healthy and get stronger     Objective      Lumbar  Isometric Testing on Med X equipment: Testing administered by PT    Test Initial Baseline Midpoint Final   Date 4/24/2024 6/12/2024    ROM 6-36 deg 0-57 deg    Max Peak Torque 80  113     Min Peak Torque 32  14     Flex/Ext Ratio 2.5 8.07:1     % variance  normative data 38 -11%     % change from initial test N/A visit 1 56%         Outcomes:  Intake Score: 24%  Visit 6 Score:   Visit 10 Score: 60%  Discharge Score:  Goal Score: 15%     Treatment     Manisha received the treatments listed below:      Medical MedX Treatment as follows:  MedX exercise started day 2:  Patient received neuromuscular education for 15 minutes via participation on the Medical MedX Machine. Therapist assisted patient in isolating and engaging spinal stabilization musculature in order to improve functional ability and postural control. Patient performed exercise with therapist guidance in order to accurately use pacer function, avoid valsalva, and optimally exert effort within a safe and effective range via the Ana Exertion Rating Scale. Patient instructed to perform at a midrange of exertion and to complete 15-20 repetitions within appropriate split time, with proper technique, and while maintaining safety.            7/1/2024     2:35 PM   [facility]   Visit Number 13   VAS Pain Rating 4   Time 5   Extension in Standing 10   Flexion in Lying 10   Lumbar Weight 50 lbs   Repetitions 20   Rating of Perceived Exertion 4   Ice - Z Lie (in min.) 5       -Re-integrated pacer and cue to maintain pace all the way through end range extension    50/20     Manisha participated in neuromuscular re-education activities to improve balance, coordination, proprioception, motor control and/or posture for 15 minutes. The following activities were included:    PPT x 5" x 10  PPT hooklying w/marching  x 10-NP  TrA w/ swiss ball x 10  (no PB)-np  Bridge + GTB  x15,3"  TrA with SLR with ball  x  10 " ea  Open books x 10 -   +90-90 TrA marching 2 x 10-np    Manisha participated in therapeutic exercises to develop strength, endurance, ROM, flexibility, posture, and core stabilization for 30 minutes including:    LTR x10  SKTC x10  Figure 4 stretch x1' ea- np  EIS 2 x10      Peripheral muscle strengthening which included one set of 15-20 repetitions at a slow and controlled 10-13 second per rep pace focused on strengthening supporting musculature in order to improve body mechanics and functional mobility. Patient and therapist focused on proper form during treatment to ensure optimal strengthening of each targeted muscle group.  Machines utilized included:Torso rotation, Leg Ext, Leg Curl, and Leg Press  To be added next visit: Hip abduction       Manisha participated in dynamic functional therapeutic activities to improve functional performance and simulate household and community activities for 00minutes. The following activities were included:    Ambulating with SPC to focus on gait mechanics and avoid tripping.     Manisha received manual therapy techniques for 0 minutes. The following activities were included:    STM thoracolumbar parspinals  T4 PA glide Grade II  Lumbar rotation Grade III-IV mobs.    Pt given cold pack for 5 minutes to low back in z-lying  Patient Education and Home Exercises     Home exercises include:  LTR x10  SKTC x10  PPT x10  Bridges x10  EIS x10    Cardio program (V5): -  Lifting education (V11): -  Posture/Lumbar roll:   Fridge Magnet Discharge handout (date given): -  Equipment at home/gym membership:     Education provided:   - PT role and POC  - HEP review  - MedX and peripheral strengthening; purpose and form    Written Home Exercises Provided: yes.  Exercises were reviewed and Manisha was able to demonstrate them prior to the end of the session.  Manisha demonstrated good  understanding of the education provided.     See EMR under Patient Instructions for exercises provided prior  visit.    Assessment     Patient presents to therapy with reports of mod pain level upon entry. She was able to perform therex with instructions and cues provided to prevent substitution patterns. She demonstrates continue core weakness with lumbopelvis training, presenting difficulty maintaining activation. . Patient Lumbar MedX weight maintained to 50ft lbs with 20 reps performed at an exertion rating of 4/10, requiring continue cues coordination to improve pace.  All peripheral strength circuit exercises completed with previous modifications and no adverse effects. Continue program per pt tolerance.     Patient is making good progress towards established goals.    Pt will continue to benefit from skilled outpatient physical therapy to address the deficits stated in the impairment chart, provide pt/family education and to maximize pt's level of independence in the home and community environment.     Anticipated Barriers for therapy: none  Pt's spiritual, cultural and educational needs considered and pt agreeable to plan of care and goals as stated below:     GOALS: Pt is in agreement with the following goals.     Short term goals:  6 weeks or 10 visits   - Pt will demonstrate increased lumbar ROM by at least 6 degrees from the initial ROM value with improvements noted in functional ROM and ability to perform ADLs. Appropriate and Ongoing  - Pt will demonstrate increased MedX average isometric strength value by 15% from initial test resulting in improved ability to perform bending, lifting, and carrying activities safely, confidently. Appropriate and Ongoing  - Pt will report a reduction in worst pain score by 1-2 points for improved tolerance for transfers sit to stand. Appropriate and Ongoing  - Pt able to perform HEP correctly with minimal cueing or supervision from therapist to encourage independent management of symptoms. Appropriate and Ongoing     Long term goals: 10 weeks or 20 visits   - Pt will  demonstrate increased lumbar ROM by at least 12 degrees from initial ROM value, resulting in improved ability to perform functional forward bending while standing and sitting. Appropriate and Ongoing  - Pt will demonstrate increased MedX average isometric strength value by 25% from initial test resulting in improved ability to perform bending, lifting, and carrying activities safely and confidently. Appropriate and Ongoing  - Pt to demonstrate ability to independently control and reduce their pain through posture positioning and mechanical movements throughout a typical day. Appropriate and Ongoing  - Pt will demonstrate reduced pain and improved functional outcomes as reported on the FOTO by reaching a limitation score of < or = 15% or less in order to demonstrate subjective improvement in pt's condition.   Appropriate and Ongoing  - Pt will demonstrate independence with the HEP at discharge. Appropriate and Ongoing  - Pt will be able to complete safe bending and lifting up to 10# without increased low back pain (patient goal) Appropriate and Ongoing    Plan     Continue with established Plan of Care towards established PT goals.     Therapist: Thao Fisher, EDUARDO  07/02/2024

## 2024-07-02 ENCOUNTER — CLINICAL SUPPORT (OUTPATIENT)
Dept: CARDIOLOGY | Facility: HOSPITAL | Age: 74
End: 2024-07-02
Attending: INTERNAL MEDICINE
Payer: MEDICARE

## 2024-07-02 ENCOUNTER — CLINICAL SUPPORT (OUTPATIENT)
Dept: CARDIOLOGY | Facility: HOSPITAL | Age: 74
End: 2024-07-02
Payer: MEDICARE

## 2024-07-02 DIAGNOSIS — Z95.0 PRESENCE OF CARDIAC PACEMAKER: ICD-10-CM

## 2024-07-02 DIAGNOSIS — R00.1 BRADYCARDIA, UNSPECIFIED: ICD-10-CM

## 2024-07-02 PROCEDURE — 93294 REM INTERROG EVL PM/LDLS PM: CPT | Mod: HCNC,,, | Performed by: INTERNAL MEDICINE

## 2024-07-02 PROCEDURE — 93296 REM INTERROG EVL PM/IDS: CPT | Mod: HCNC | Performed by: INTERNAL MEDICINE

## 2024-07-05 ENCOUNTER — CLINICAL SUPPORT (OUTPATIENT)
Dept: REHABILITATION | Facility: OTHER | Age: 74
End: 2024-07-05
Payer: MEDICARE

## 2024-07-05 DIAGNOSIS — R26.89 BALANCE PROBLEM: ICD-10-CM

## 2024-07-05 DIAGNOSIS — R29.898 DECREASED STRENGTH OF TRUNK AND BACK: ICD-10-CM

## 2024-07-05 DIAGNOSIS — R68.89 DECREASED FUNCTIONAL ACTIVITY TOLERANCE: Primary | ICD-10-CM

## 2024-07-05 PROCEDURE — 97112 NEUROMUSCULAR REEDUCATION: CPT | Mod: HCNC,CQ

## 2024-07-05 NOTE — PROGRESS NOTES
[facility]  Physical Therapy Treatment Note   Name: Manisha Wynne  Clinic Number: [id number]    Therapy Diagnosis:   Encounter Diagnoses   Name Primary?    Decreased functional activity tolerance Yes    Decreased strength of trunk and back     Balance problem          Physician: Nicolette Cheek MD    Visit Date: 7/5/2024    Physician Orders: PT Eval and Treat  Medical Diagnosis from Referral:   1. Decreased strength of trunk and back    2. Back pain, unspecified back location, unspecified back pain laterality, unspecified chronicity    3. Balance problem    4. Decreased functional activity tolerance       Evaluation Date: 4/24/2024  Authorization Period Expiration: 2/21/2025  Plan of Care Expiration: 7/24/2024  Reassessment Due: 5/24/2024  Visit # / Visits authorized: 14/20      Time In: 9:00am  Time Out: 10:00 am   Total Billable Time: 60 minutes 1:1-30 mins  INSURANCE and OUTCOMES: Fee for Service with FOTO Outcomes 1/3     Precautions: standard, fall, R elbow fracture     Pattern of pain determined: 1 Movement responder    MedX Testing:MedX testing visit 1  Subjective   Manisha Wynne reports her back is feeling better today. Less low back pain after changing cats litter box yesterday.    Patient reports tolerating previous visit well.   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  Leisure: used to walk with a friend,       Prior Level of Function: I with increased time  Current Level of Function: Difficulty with cleaning  DME owned/used: Cane, shower chair, hand held shower, grab bars  Gym Membership: No    Pt goals: Pts goals: Get healthy and get stronger   Objective      Lumbar  Isometric Testing on Med X  "equipment: Testing administered by PT    Test Initial Baseline Midpoint Final   Date 4/24/2024 6/12/2024    ROM 6-36 deg 0-57 deg    Max Peak Torque 80  113     Min Peak Torque 32  14     Flex/Ext Ratio 2.5 8.07:1     % variance  normative data 38 -11%     % change from initial test N/A visit 1 56%         Outcomes:  Intake Score: 24%  Visit 6 Score:   Visit 10 Score: 60%  Discharge Score:  Goal Score: 15%     Treatment     Manisha received the treatments listed below:      Medical MedX Treatment as follows:  MedX exercise started day 2:  Patient received neuromuscular education for 10 minutes via participation on the Medical MedX Machine. Therapist assisted patient in isolating and engaging spinal stabilization musculature in order to improve functional ability and postural control. Patient performed exercise with therapist guidance in order to accurately use pacer function, avoid valsalva, and optimally exert effort within a safe and effective range via the Ana Exertion Rating Scale. Patient instructed to perform at a midrange of exertion and to complete 15-20 repetitions within appropriate split time, with proper technique, and while maintaining safety.          7/5/2024     9:11 AM   [facility] - Short   Visit Number 14   VAS Pain Rating 2   Time 5   Lumbar Weight 54 lbs   Repetitions 15   Rating of Perceived Exertion 4              -Re-integrated pacer and cue to maintain pace all the way through end range extension    50/20     Manisha participated in neuromuscular re-education activities to improve balance, coordination, proprioception, motor control and/or posture for 10 minutes. The following activities were included:    PPT x 5" x 10  +TrA training x10 (with patient/therapist palpation to ensure proper recruitment)  +TrA brace + marching x10  PPT hooklying w/marching  x 10-NP  TrA w/ swiss ball x 10  (no PB)-np  Bridge + blue TB  2x10,3"  TrA with SLR with ball  x10 ea    90-90 TrA marching 2 x " 10-np    Manisha participated in therapeutic exercises to develop strength, endurance, ROM, flexibility, posture, and core stabilization for 35  minutes including:    LTR x10  SKTC x10  Figure 4 stretch x1' ea- np  EIS 2 x10      Peripheral muscle strengthening which included one set of 15-20 repetitions at a slow and controlled 10-13 second per rep pace focused on strengthening supporting musculature in order to improve body mechanics and functional mobility. Patient and therapist focused on proper form during treatment to ensure optimal strengthening of each targeted muscle group.  Machines utilized included:Torso rotation, Leg Ext, Leg Curl, and Leg Press  To be added next visit: Hip abduction       Manisha participated in dynamic functional therapeutic activities to improve functional performance and simulate household and community activities for 00minutes. The following activities were included:    Ambulating with SPC to focus on gait mechanics and avoid tripping.     Manisha received manual therapy techniques for 0 minutes. The following activities were included:    STM thoracolumbar parspinals  T4 PA glide Grade II  Lumbar rotation Grade III-IV mobs.    Pt given cold pack for 5 minutes to low back in z-lying  Patient Education and Home Exercises     Home exercises include:  LTR x10  SKTC x10  PPT x10  Bridges x10  EIS x10    Cardio program (V5): -  Lifting education (V11): -  Posture/Lumbar roll:   Fridge Magnet Discharge handout (date given): -  Equipment at home/gym membership:     Education provided:   - PT role and POC  - HEP review  - MedX and peripheral strengthening; purpose and form    Written Home Exercises Provided: yes.  Exercises were reviewed and Manisha was able to demonstrate them prior to the end of the session.  Manisha demonstrated good  understanding of the education provided.     See EMR under Patient Instructions for exercises provided prior visit.    Assessment   Patient tolerated  slight core/lumbopelvic stabilization progressions with moderate cueing to maintain activation throughout exercise and avoid beatris raeann. Educated on importance of functional ab brace with bed mobility, transfers and bending/lifting. Patient able to perform 15 reps on l/s medx machine with an RPE of 4/10 still requiring max cueing and initial removal of pacer for pacing and motor control. Correct weight reflected on flowsheet, update on machine nv.  All peripheral strength circuit exercises completed with previous modifications and no adverse effects. Continue program per pt tolerance.     Patient is making good progress towards established goals.    Pt will continue to benefit from skilled outpatient physical therapy to address the deficits stated in the impairment chart, provide pt/family education and to maximize pt's level of independence in the home and community environment.     Anticipated Barriers for therapy: none  Pt's spiritual, cultural and educational needs considered and pt agreeable to plan of care and goals as stated below:     GOALS: Pt is in agreement with the following goals.     Short term goals:  6 weeks or 10 visits   - Pt will demonstrate increased lumbar ROM by at least 6 degrees from the initial ROM value with improvements noted in functional ROM and ability to perform ADLs. Appropriate and Ongoing  - Pt will demonstrate increased MedX average isometric strength value by 15% from initial test resulting in improved ability to perform bending, lifting, and carrying activities safely, confidently. Appropriate and Ongoing  - Pt will report a reduction in worst pain score by 1-2 points for improved tolerance for transfers sit to stand. Appropriate and Ongoing  - Pt able to perform HEP correctly with minimal cueing or supervision from therapist to encourage independent management of symptoms. Appropriate and Ongoing     Long term goals: 10 weeks or 20 visits   - Pt will demonstrate increased lumbar  ROM by at least 12 degrees from initial ROM value, resulting in improved ability to perform functional forward bending while standing and sitting. Appropriate and Ongoing  - Pt will demonstrate increased MedX average isometric strength value by 25% from initial test resulting in improved ability to perform bending, lifting, and carrying activities safely and confidently. Appropriate and Ongoing  - Pt to demonstrate ability to independently control and reduce their pain through posture positioning and mechanical movements throughout a typical day. Appropriate and Ongoing  - Pt will demonstrate reduced pain and improved functional outcomes as reported on the FOTO by reaching a limitation score of < or = 15% or less in order to demonstrate subjective improvement in pt's condition.   Appropriate and Ongoing  - Pt will demonstrate independence with the HEP at discharge. Appropriate and Ongoing  - Pt will be able to complete safe bending and lifting up to 10# without increased low back pain (patient goal) Appropriate and Ongoing    Plan     Continue with established Plan of Care towards established PT goals.     Therapist: Amina Wiggins, PTA  07/05/2024

## 2024-07-08 ENCOUNTER — OFFICE VISIT (OUTPATIENT)
Dept: URGENT CARE | Facility: CLINIC | Age: 74
End: 2024-07-08
Payer: MEDICARE

## 2024-07-08 ENCOUNTER — CLINICAL SUPPORT (OUTPATIENT)
Dept: REHABILITATION | Facility: OTHER | Age: 74
End: 2024-07-08
Payer: MEDICARE

## 2024-07-08 VITALS
DIASTOLIC BLOOD PRESSURE: 57 MMHG | RESPIRATION RATE: 18 BRPM | HEART RATE: 63 BPM | HEIGHT: 68 IN | BODY MASS INDEX: 23.43 KG/M2 | WEIGHT: 154.56 LBS | TEMPERATURE: 98 F | OXYGEN SATURATION: 99 % | SYSTOLIC BLOOD PRESSURE: 115 MMHG

## 2024-07-08 DIAGNOSIS — R29.898 DECREASED STRENGTH OF TRUNK AND BACK: ICD-10-CM

## 2024-07-08 DIAGNOSIS — S01.01XS SCALP LACERATION, SEQUELA: ICD-10-CM

## 2024-07-08 DIAGNOSIS — R68.89 DECREASED FUNCTIONAL ACTIVITY TOLERANCE: Primary | ICD-10-CM

## 2024-07-08 DIAGNOSIS — Z48.02 ENCOUNTER FOR STAPLE REMOVAL: Primary | ICD-10-CM

## 2024-07-08 DIAGNOSIS — R26.89 BALANCE PROBLEM: ICD-10-CM

## 2024-07-08 PROCEDURE — 97112 NEUROMUSCULAR REEDUCATION: CPT | Mod: HCNC,CQ

## 2024-07-08 PROCEDURE — 99212 OFFICE O/P EST SF 10 MIN: CPT | Mod: S$GLB,,, | Performed by: NURSE PRACTITIONER

## 2024-07-08 PROCEDURE — 99499 UNLISTED E&M SERVICE: CPT | Mod: S$GLB,,, | Performed by: NURSE PRACTITIONER

## 2024-07-08 NOTE — PROCEDURES
Staple removal:  Area cleaned with saline, 5 staples removed without difficulty, suture line edges well approximated

## 2024-07-08 NOTE — PATIENT INSTRUCTIONS
Suture/Staple Removal  Please return here or go to the Emergency Department for any concerns or worsening of condition.  If you were prescribed antibiotics, please take them to completion.  If you were prescribed a narcotic medication, do not drive or operate heavy equipment or machinery while taking these medications.  If you have any of the following symptoms below, please go directly to the ER:  Wound reopens or bleeds  Signs of an infection, such as:  Increasing redness or swelling around the wound  Increased warmth from the wound  Worsening pain  Red streaking lines away from the wound  Fluid draining from the wound  Fever of 100.4°F (38°C) or higher, or as directed by your child's healthcare provider  Please follow up with your primary care doctor or specialist as needed.    If you  smoke, please stop smoking.

## 2024-07-08 NOTE — PROGRESS NOTES
"Subjective:      Patient ID: Manisha Wynne is a 73 y.o. female.    Vitals:  height is 5' 8" (1.727 m) and weight is 70.1 kg (154 lb 8.7 oz). Her oral temperature is 98.1 °F (36.7 °C). Her blood pressure is 115/57 (abnormal) and her pulse is 63. Her respiration is 18 and oxygen saturation is 99%.     Chief Complaint: Suture / Staple Removal    73 year old female c/o staple removal. Pt states she received the staples on Monday the 1st. Pt states that she fell at her house and hit her head on the wheel of a chair. Pt staples are the back of her head. Pt states she experience nasuea and headaches. The area is tender and hurts to touch and it itches. Pt states she has been taking tylenol.     73-year-old female presents to clinic requesting staple removal from a scalp laceration repair. On 6/25/24 fourteen days ago, she presented to the ER after a fall resulting in right parietal scalp laceration repaired with 5 staples     Suture / Staple Removal  The sutures were placed 7 to 10 days ago. Treatments tried: washed her hair. The treatment provided mild relief. Her temperature was unmeasured prior to arrival. There has been no drainage from the wound. There is no swelling present. The pain has not changed (tender). She has no difficulty moving the affected extremity or digit.     Skin:  Positive for laceration. Negative for erythema.      Objective:     Physical Exam   Constitutional: She is oriented to person, place, and time. She appears well-developed.   HENT:   Head: Normocephalic. Head is with laceration. Head is without abrasion and without contusion.   Ears:   Right Ear: External ear normal.   Left Ear: External ear normal.   Nose: Nose normal.   Mouth/Throat: Oropharynx is clear and moist and mucous membranes are normal.   Eyes: EOM and lids are normal. Extraocular movement intact   Neck: Trachea normal and phonation normal. Neck supple.   Cardiovascular: Normal rate.   Pulmonary/Chest: Effort normal. No " stridor. No respiratory distress.   Musculoskeletal: Normal range of motion.         General: Normal range of motion.   Neurological: She is alert and oriented to person, place, and time.   Skin: Skin is warm, dry, intact and no rash. Capillary refill takes less than 2 seconds. No abrasion, No burn, No bruising, No erythema and No ecchymosis   Psychiatric: Her speech is normal and behavior is normal. Judgment and thought content normal.   Nursing note and vitals reviewed.            Assessment:     1. Encounter for staple removal    2. Scalp laceration, sequela        Plan:       Encounter for staple removal    Scalp laceration, sequela    Other orders  -     Cancel: Foreign body removal      Staple removal:  Area cleaned with saline, 5 staples removed without difficulty, suture line edges well approximated             Patient Instructions   Suture/Staple Removal  Please return here or go to the Emergency Department for any concerns or worsening of condition.  If you were prescribed antibiotics, please take them to completion.  If you were prescribed a narcotic medication, do not drive or operate heavy equipment or machinery while taking these medications.  If you have any of the following symptoms below, please go directly to the ER:  Wound reopens or bleeds  Signs of an infection, such as:  Increasing redness or swelling around the wound  Increased warmth from the wound  Worsening pain  Red streaking lines away from the wound  Fluid draining from the wound  Fever of 100.4°F (38°C) or higher, or as directed by your child's healthcare provider  Please follow up with your primary care doctor or specialist as needed.    If you  smoke, please stop smoking.

## 2024-07-08 NOTE — PROGRESS NOTES
[facility]  Physical Therapy Treatment Note   Name: Manisha Wynne  Clinic Number: [id number]    Therapy Diagnosis:   Encounter Diagnoses   Name Primary?    Decreased functional activity tolerance Yes    Decreased strength of trunk and back     Balance problem          Physician: Nicolette Cheek MD    Visit Date: 2024    Physician Orders: PT Eval and Treat  Medical Diagnosis from Referral:   1. Decreased strength of trunk and back    2. Back pain, unspecified back location, unspecified back pain laterality, unspecified chronicity    3. Balance problem    4. Decreased functional activity tolerance       Evaluation Date: 2024  Authorization Period Expiration: 2025  Plan of Care Expiration: 2024  Reassessment Due: 2024  Visit # / Visits authorized:       Time In: 310 PM  Time Out: 400 pm   Total Billable Time: 50 minutes 1:1 25 mins  INSURANCE and OUTCOMES: Fee for Service with FOTO Outcomes 1/3     Precautions: standard, fall, R elbow fracture     Pattern of pain determined: 1 Movement responder    MedX Testing:MedX testing visit 1  Subjective   Manisha Wynne reports she is very tired today. She worked till 1 am and then was at a  today in the heat.     Patient reports tolerating previous visit well.   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  Leisure: used to walk with a friend,       Prior Level of Function: I with increased time  Current Level of Function: Difficulty with cleaning  DME owned/used: Cane, shower chair, hand held shower, grab bars  Gym Membership: No    Pt goals: Pts goals: Get healthy and get stronger   Objective      Lumbar  Isometric Testing on Med X  "equipment: Testing administered by PT    Test Initial Baseline Midpoint Final   Date 4/24/2024 6/12/2024    ROM 6-36 deg 0-57 deg    Max Peak Torque 80  113     Min Peak Torque 32  14     Flex/Ext Ratio 2.5 8.07:1     % variance  normative data 38 -11%     % change from initial test N/A visit 1 56%         Outcomes:  Intake Score: 24%  Visit 6 Score:   Visit 10 Score: 60%  Discharge Score:  Goal Score: 15%     Treatment     Manisha received the treatments listed below:      Medical MedX Treatment as follows:  MedX exercise started day 2:  Patient received neuromuscular education for 10 minutes via participation on the Medical MedX Machine. Therapist assisted patient in isolating and engaging spinal stabilization musculature in order to improve functional ability and postural control. Patient performed exercise with therapist guidance in order to accurately use pacer function, avoid valsalva, and optimally exert effort within a safe and effective range via the Ana Exertion Rating Scale. Patient instructed to perform at a midrange of exertion and to complete 15-20 repetitions within appropriate split time, with proper technique, and while maintaining safety.          7/8/2024     3:17 PM   [facility]   Visit Number 15   VAS Pain Rating 2   Lumbar Weight 54 lbs   Repetitions 18   Rating of Perceived Exertion 3   Ice - Z Lie (in min.) 5       -Re-integrated pacer and cue to maintain pace all the way through end range extension         Manisha participated in neuromuscular re-education activities to improve balance, coordination, proprioception, motor control and/or posture for 10 minutes. The following activities were included:    PPT x 5" x 10  PPT hooklying w/marching  x 10-  Bridge + blue TB  2x10,3"  TrA with SLR with ball  x10 ea    Manisha participated in therapeutic exercises to develop strength, endurance, ROM, flexibility, posture, and core stabilization for 30  minutes including:    Not performed " 7/8/2024 :  LTR x10  SKTC x10  Figure 4 stretch x1' ea- np  EIS 2 x10      Peripheral muscle strengthening which included one set of 15-20 repetitions at a slow and controlled 10-13 second per rep pace focused on strengthening supporting musculature in order to improve body mechanics and functional mobility. Patient and therapist focused on proper form during treatment to ensure optimal strengthening of each targeted muscle group.  Machines utilized included:Torso rotation, Leg Ext, Leg Curl, and Leg Press  To be added next visit: Hip abduction       Manisha participated in dynamic functional therapeutic activities to improve functional performance and simulate household and community activities for 00minutes. The following activities were included:    Ambulating with SPC to focus on gait mechanics and avoid tripping.     Manisha received manual therapy techniques for 0 minutes. The following activities were included:    STM thoracolumbar parspinals  T4 PA glide Grade II  Lumbar rotation Grade III-IV mobs.    Pt given cold pack for 5 minutes to low back in z-lying  Patient Education and Home Exercises     Home exercises include:  LTR x10  SKTC x10  PPT x10  Bridges x10  EIS x10    Cardio program (V5): -  Lifting education (V11): -  Posture/Lumbar roll:   Fridge Magnet Discharge handout (date given): -  Equipment at home/gym membership:     Education provided:   - PT role and POC  - HEP review  - MedX and peripheral strengthening; purpose and form    Written Home Exercises Provided: yes.  Exercises were reviewed and Manisha was able to demonstrate them prior to the end of the session.  Manisha demonstrated good  understanding of the education provided.     See EMR under Patient Instructions for exercises provided prior visit.    Assessment   Manisha tolerated session well despite arriving to therapy with inc fatigued. Session limited due to pts late arrival. Session focused on lumbo pelvic stability which pt  tolerated well. MedX was performed at 54ft lbs with 18 reps performed at an exertion rating of 3/10. Will progress as tolerated per HB protocols.       Patient is making good progress towards established goals.    Pt will continue to benefit from skilled outpatient physical therapy to address the deficits stated in the impairment chart, provide pt/family education and to maximize pt's level of independence in the home and community environment.     Anticipated Barriers for therapy: none  Pt's spiritual, cultural and educational needs considered and pt agreeable to plan of care and goals as stated below:     GOALS: Pt is in agreement with the following goals.     Short term goals:  6 weeks or 10 visits   - Pt will demonstrate increased lumbar ROM by at least 6 degrees from the initial ROM value with improvements noted in functional ROM and ability to perform ADLs. Appropriate and Ongoing  - Pt will demonstrate increased MedX average isometric strength value by 15% from initial test resulting in improved ability to perform bending, lifting, and carrying activities safely, confidently. Appropriate and Ongoing  - Pt will report a reduction in worst pain score by 1-2 points for improved tolerance for transfers sit to stand. Appropriate and Ongoing  - Pt able to perform HEP correctly with minimal cueing or supervision from therapist to encourage independent management of symptoms. Appropriate and Ongoing     Long term goals: 10 weeks or 20 visits   - Pt will demonstrate increased lumbar ROM by at least 12 degrees from initial ROM value, resulting in improved ability to perform functional forward bending while standing and sitting. Appropriate and Ongoing  - Pt will demonstrate increased MedX average isometric strength value by 25% from initial test resulting in improved ability to perform bending, lifting, and carrying activities safely and confidently. Appropriate and Ongoing  - Pt to demonstrate ability to independently  control and reduce their pain through posture positioning and mechanical movements throughout a typical day. Appropriate and Ongoing  - Pt will demonstrate reduced pain and improved functional outcomes as reported on the FOTO by reaching a limitation score of < or = 15% or less in order to demonstrate subjective improvement in pt's condition.   Appropriate and Ongoing  - Pt will demonstrate independence with the HEP at discharge. Appropriate and Ongoing  - Pt will be able to complete safe bending and lifting up to 10# without increased low back pain (patient goal) Appropriate and Ongoing    Plan     Continue with established Plan of Care towards established PT goals.     Therapist: Deandre Neil, PTA  07/08/2024

## 2024-07-09 ENCOUNTER — PATIENT MESSAGE (OUTPATIENT)
Dept: REHABILITATION | Facility: HOSPITAL | Age: 74
End: 2024-07-09

## 2024-07-09 ENCOUNTER — DOCUMENTATION ONLY (OUTPATIENT)
Dept: REHABILITATION | Facility: HOSPITAL | Age: 74
End: 2024-07-09
Payer: MEDICARE

## 2024-07-09 NOTE — PROGRESS NOTES
Patient no-showed today's appointment; appointment was for outpatient occupational therapy services. The patient has not attended an occupational therapy appointment since experiencing a fall, per chart review. The therapist will follow up with the patient to ensure sound transportation and communication to therapy services. No charges for this date.      - Santiago Adams, OTR/L

## 2024-07-10 ENCOUNTER — HOSPITAL ENCOUNTER (OUTPATIENT)
Dept: RADIOLOGY | Facility: HOSPITAL | Age: 74
Discharge: HOME | End: 2024-07-10
Attending: PHYSICIAN ASSISTANT
Payer: MEDICARE

## 2024-07-10 ENCOUNTER — TELEPHONE (OUTPATIENT)
Dept: NEUROLOGY | Facility: CLINIC | Age: 74
End: 2024-07-10
Payer: MEDICARE

## 2024-07-10 ENCOUNTER — HOSPITAL ENCOUNTER (OUTPATIENT)
Dept: RADIOLOGY | Facility: HOSPITAL | Age: 74
Discharge: HOME | End: 2024-07-10
Attending: STUDENT IN AN ORGANIZED HEALTH CARE EDUCATION/TRAINING PROGRAM
Payer: MEDICARE

## 2024-07-10 VITALS
HEIGHT: 68 IN | SYSTOLIC BLOOD PRESSURE: 166 MMHG | OXYGEN SATURATION: 99 % | HEART RATE: 60 BPM | WEIGHT: 154 LBS | TEMPERATURE: 97 F | RESPIRATION RATE: 20 BRPM | BODY MASS INDEX: 23.34 KG/M2 | DIASTOLIC BLOOD PRESSURE: 72 MMHG

## 2024-07-10 DIAGNOSIS — G95.9 CERVICAL MYELOPATHY: ICD-10-CM

## 2024-07-10 DIAGNOSIS — G62.9 NEUROPATHY: ICD-10-CM

## 2024-07-10 PROCEDURE — 72131 CT LUMBAR SPINE W/O DYE: CPT | Mod: TC,HCNC

## 2024-07-10 PROCEDURE — 72125 CT NECK SPINE W/O DYE: CPT | Mod: TC,HCNC

## 2024-07-10 PROCEDURE — 25000003 PHARM REV CODE 250: Mod: HCNC | Performed by: PHYSICIAN ASSISTANT

## 2024-07-10 PROCEDURE — 25500020 PHARM REV CODE 255: Mod: HCNC | Performed by: PHYSICIAN ASSISTANT

## 2024-07-10 PROCEDURE — 62305 MYELOGRAPHY LUMBAR INJECTION: CPT | Mod: HCNC

## 2024-07-10 RX ORDER — LIDOCAINE HYDROCHLORIDE 10 MG/ML
3 INJECTION INFILTRATION; PERINEURAL ONCE
Status: COMPLETED | OUTPATIENT
Start: 2024-07-10 | End: 2024-07-10

## 2024-07-10 RX ORDER — SODIUM CHLORIDE 9 MG/ML
INJECTION, SOLUTION INTRAVENOUS CONTINUOUS
Status: DISCONTINUED | OUTPATIENT
Start: 2024-07-10 | End: 2024-07-11 | Stop reason: HOSPADM

## 2024-07-10 RX ADMIN — IOHEXOL 10 ML: 300 INJECTION, SOLUTION INTRAVENOUS at 10:07

## 2024-07-10 RX ADMIN — LIDOCAINE HYDROCHLORIDE 3 ML: 10 INJECTION, SOLUTION INFILTRATION; PERINEURAL at 10:07

## 2024-07-10 NOTE — DISCHARGE SUMMARY
Vascular and Interventional Radiology Discharge Summary      Hospital Course:  Pt underwent myelogram. No complications    Admit Date:  7/10/2024    Discharge Date:  07/10/2024     Instructions Given to Patient:  Yes    Diet:  Resume prior diet    Activity:  Activity as tolerated    Description of Condition on Discharge:  Stable    Vital Signs (Most Recent):    Temp: 97.3 °F (36.3 °C) (07/10/24 1150)  Pulse: 60 (07/10/24 1245)  Resp: 20 (07/10/24 1245)  BP: (!) 166/72 (07/10/24 1245)  SpO2: 99 % (07/10/24 1245)    Discharge Disposition:  Home    Discharge Diagnosis:  Myelopathy    Procedure(s) Performed:  myelogram     Follow-up:  As scheduled    Discharge Prescriptions:  Per discharge orders      Felix Mistry MD  R1, Diagnostic and Interventional Radiology

## 2024-07-10 NOTE — NURSING
patient returned to [facility] after completing myelogram, patient is in NAD and will recover for 1 hour.

## 2024-07-10 NOTE — PROCEDURES
Radiology Post-Procedure Note    Pre Op Diagnosis: myelopathy    Post Op Diagnosis: Same    Procedure: lumbar puncture    Procedure performed by: Felix Mistry MD    Written Informed Consent Obtained: Yes    Specimen Removed: 0 mL CSF    Estimated Blood Loss: Minimal    Findings: Following written informed consent and sterile prep and drape, a 22 gauge spinal needle was inserted at L3 - L4 intralaminar space under fluoroscopic surveillance.  0 mL  CSF removed. 10 cc of Omnipaque 300 was injected intrathecally under fluoroscopic guidance.  There were no complications.    Patient tolerated procedure well.    Felix Mistry MD  [facility]

## 2024-07-10 NOTE — NURSING
Pt given discharge instructions with all questions addressed  Phone numbers to clinic and resident on call provided for any other concerns.

## 2024-07-10 NOTE — H&P
Vascular and Interventional Radiology History & Physical    Date:  7/10/2024    Chief Complaint:   myelopathy    History of Present Illness:  Manisha Wynne is a 73 y.o. female who presents for myelogram.    Past Medical History:  Past Medical History:   Diagnosis Date    Abnormal Pap smear     Atrial fibrillation     AV block, 1st degree 07/25/2012    C. difficile diarrhea     RESOLVED    Cataract     Depression 07/24/2012    Facet arthritis of lumbar region 03/31/2015    Falls     3 falls in the last 6 mos--noted 6/19/19    Hyperlipidemia     Hypertension 07/24/2012    Neuropathy     Other specified cardiac dysrhythmias(427.89)     Sleep apnea     Syncope 07/24/2012    Tremors of nervous system     hands bilaterally       Past Surgical History:  Past Surgical History:   Procedure Laterality Date    APPLICATION OF CARTILAGE GRAFT Bilateral 12/19/2019    Procedure: APPLICATION, CARTILAGE GRAFT AURICULAR JEZ;  Surgeon: Martinez Flores III, MD;  Location: [facility];  Service: ENT;  Laterality: Bilateral;    CARDIAC PACEMAKER PLACEMENT  09/07/2012    [id number]    CATARACT EXTRACTION W/  INTRAOCULAR LENS IMPLANT Right 5/16/2022    Procedure: EXTRACTION, CATARACT, WITH IOL INSERTION;  Surgeon: Ines Aguilera MD;  Location: [facility];  Service: Ophthalmology;  Laterality: Right;  Catalys     CATARACT EXTRACTION W/  INTRAOCULAR LENS IMPLANT Left 6/20/2022    Procedure: EXTRACTION, CATARACT, WITH IOL INSERTION;  Surgeon: Ines Aguilera MD;  Location: [facility];  Service: Ophthalmology;  Laterality: Left;  Catalys     DILATION AND CURETTAGE OF UTERUS      Hx of precancerous cells?    ENDOSCOPIC ULTRASOUND OF UPPER GASTROINTESTINAL TRACT N/A 7/5/2019    Procedure: ULTRASOUND, UPPER GI TRACT, ENDOSCOPIC;  Surgeon: Kev Calderon MD;  Location: [facility] ([address] [place] [address]);  Service: Endoscopy;  Laterality: N/A;  Pacemaker-Adam   appt confirmed-rb    MYELOGRAPHY N/A 5/4/2021    Procedure: Myelogram  CERVICAL,  THORACIC AND LUMBAR;  Surgeon: [facility];  [address];  Service: Radiology;  Laterality: N/A;    NASAL SEPTOPLASTY N/A 12/19/2019    Procedure: SEPTOPLASTY, NOSE;  Surgeon: Martinez Flores III, MD;  Location: [facility];  Service: ENT;  Laterality: N/A;  FOLLOW DR SALVATORE KIMBROUGH PROTOCOL    OPEN REDUCTION AND INTERNAL FIXATION (ORIF) OF FRACTURE OF DISTAL RADIUS Left 1/24/2020    Procedure: ORIF, FRACTURE, RADIUS, DISTAL-left;  Surgeon: Ellen Moe MD;  Location: [facility] OR;  Service: Orthopedics;  Laterality: Left;    OPEN REDUCTION AND INTERNAL FIXATION (ORIF) OF INJURY OF ELBOW Right 2/28/2024    Procedure: ORIF, ELBOW;  Surgeon: Ellen Moe MD;  Location: [facility];  Service: Orthopedics;  Laterality: Right;    POSTERIOR FUSION OF CERVICAL SPINE WITH LAMINECTOMY N/A 11/14/2022    Procedure: LAMINECTOMY, SPINE, CERVICAL, WITH POSTERIOR FUSION C3-C6;  Surgeon: Nicolette Cheek MD;  [address];  Service: Neurosurgery;  Laterality: N/A;  [place] III, ASA III, BLOOD TYPE AND SCREEN, NEUROMONITORING EMG SEP MEP, BRACE/HALO [place], BED REGULAR BED, HEADREST [place], POSITION PRONE, SPECIAL EQUIPMENT NIKI [place], RADIOLOGY C-ARM, EXT. BONE STIMULATOR BIOMET    REPLACEMENT OF PACEMAKER GENERATOR Left 10/7/2022    Procedure: REPLACEMENT, PACEMAKER GENERATOR;  Surgeon: John Sheets MD;  Location: [facility] EP LAB;  Service: Cardiology;  Laterality: Left;  YAS, SSS, AVB, Dual PPM Gen Change,SJM, MAC ,MO, 3 prep,** Adam dcPPM in situ**    SURGICAL REMOVAL OF NASAL TURBINATE Bilateral 12/19/2019    Procedure: EXCISION, NASAL TURBINATE;  Surgeon: Martinez Flores III, MD;  Location: [facility];  Service: ENT;  Laterality: Bilateral;    TONSILLECTOMY      WISDOM TOOTH EXTRACTION          Sedation History:    Denies any adverse reactions.  Denies problems laying flat.    Social History:  Social History     Tobacco Use    Smoking status: Former     Current packs/day: 0.00      Average packs/day: 0.3 packs/day for 15.0 years (3.8 ttl pk-yrs)     Types: Cigarettes     Start date: 1967     Quit date: 1982     Years since quittin.9    Smokeless tobacco: Former   Substance Use Topics    Alcohol use: Yes     Comment: no daily or heavy use, ~4 times per month    Drug use: No        Home Medications:   Prior to Admission medications    Medication Sig Start Date End Date Taking? Authorizing Provider   acetaminophen (TYLENOL) 500 MG tablet Take 2 tablets (1,000 mg total) by mouth 2 (two) times daily as needed for Pain. Begin post op day 3. 24  Yes Sarmad Varghese PA-C   buPROPion (WELLBUTRIN XL) 300 MG 24 hr tablet Take 1 tablet by mouth once daily. 12/15/22  Yes Provider, Historical   lisdexamfetamine (VYVANSE) 70 MG capsule Take 70 mg by mouth every morning.   Yes Provider, Historical   losartan (COZAAR) 25 MG tablet TAKE 1 TABLET ONE TIME DAILY 6/3/24  Yes Natali Comer MD   melatonin (MELATIN) 5 mg Take 1 tablet by mouth every evening. (For insomnia)   Yes Provider, Historical   methocarbamoL (ROBAXIN) 500 MG Tab One - two daily as needed for back pain 5/3/24  Yes Iris Blue MD   methyl salicylate-menthol 15-10% 15-10 % Crea Apply topically every evening. 22  Yes Angely Dillon, ANNELIESE   pravastatin (PRAVACHOL) 40 MG tablet Take 1 tablet (40 mg total) by mouth once daily. 24  Yes Iris Blue MD   PROLIA 60 mg/mL Syrg Inject 60 mg into the skin every 6 (six) months. 23  Yes Provider, Historical   propranoloL (INDERAL) 20 MG tablet TAKE 1 TABLET TWICE DAILY 24  Yes Natali Comer MD   aspirin (ECOTRIN) 81 MG EC tablet Take 1 tablet (81 mg total) by mouth 2 (two) times a day. 2/27/24 3/29/24  Sarmad Varghese PA-C       Inpatient Medications:    Current Outpatient Medications:     acetaminophen (TYLENOL) 500 MG tablet, Take 2 tablets (1,000 mg total) by mouth 2 (two) times daily as needed for Pain. Begin post op day 3.,  Disp: 50 tablet, Rfl: 0    buPROPion (WELLBUTRIN XL) 300 MG 24 hr tablet, Take 1 tablet by mouth once daily., Disp: , Rfl:     lisdexamfetamine (VYVANSE) 70 MG capsule, Take 70 mg by mouth every morning., Disp: , Rfl:     losartan (COZAAR) 25 MG tablet, TAKE 1 TABLET ONE TIME DAILY, Disp: 90 tablet, Rfl: 3    melatonin (MELATIN) 5 mg, Take 1 tablet by mouth every evening. (For insomnia), Disp: , Rfl:     methocarbamoL (ROBAXIN) 500 MG Tab, One - two daily as needed for back pain, Disp: 40 tablet, Rfl: 0    methyl salicylate-menthol 15-10% 15-10 % Crea, Apply topically every evening., Disp: 113 g, Rfl: 0    pravastatin (PRAVACHOL) 40 MG tablet, Take 1 tablet (40 mg total) by mouth once daily., Disp: 90 tablet, Rfl: 1    PROLIA 60 mg/mL Syrg, Inject 60 mg into the skin every 6 (six) months., Disp: , Rfl:     propranoloL (INDERAL) 20 MG tablet, TAKE 1 TABLET TWICE DAILY, Disp: 180 tablet, Rfl: 3    aspirin (ECOTRIN) 81 MG EC tablet, Take 1 tablet (81 mg total) by mouth 2 (two) times a day., Disp: 60 tablet, Rfl: 0    Current Facility-Administered Medications:     0.9%  NaCl infusion, , Intravenous, Continuous, Felix Mistry MD    sodium chloride 0.9% bolus 500 mL 500 mL, 500 mL, Intravenous, Once, Felix Mistry MD     Anticoagulants/Antiplatelets:   aspirin, held since 7/5    Allergies:   Review of patient's allergies indicates:  No Known Allergies    Review of Systems:   As documented in primary provider H&P.    Vital Signs (Most Recent):       Physical Exam:  No acute distress, laying comfortably in bed, pleasant and cooperative  Regular rate and rhythm  Breathing unlabored  Abdomen benign  Extremities warm and well perfused    Sedation Exam:  ASA: II - Patient appears to have mild systemic disease, adequately controlled   Mallampati: II (hard and soft palate, upper portion of tonsils anduvula visible)     Laboratory:  Lab Results   Component Value Date    INR 1.1 10/07/2022       Lab Results   Component Value  Date    WBC 9.88 05/03/2024    HGB 12.8 05/03/2024    HCT 39.6 05/03/2024    MCV 93 05/03/2024     05/03/2024      Lab Results   Component Value Date     03/08/2024     03/08/2024    K 3.9 03/08/2024     03/08/2024    CO2 24 03/08/2024    BUN 22 03/08/2024    CREATININE 1.2 03/08/2024    CALCIUM 10.5 03/08/2024    MG 2.1 02/24/2024    ALT 18 03/08/2024    AST 22 03/08/2024    ALBUMIN 3.4 (L) 03/08/2024    BILITOT 0.6 03/08/2024    BILIDIR 0.3 03/22/2022       Imaging:  Reviewed.      ASSESSMENT/PLAN:   Manisha Wynne is a 74 yo woman who presents for myelogram.    Risks and benefits of the procedure were discussed with the patient. Informed consent was obtained.                      Sedation Plan: local only      Felix Mistry MD  [facility]

## 2024-07-10 NOTE — TELEPHONE ENCOUNTER
Call pt in regards to appt schedule on 7/19/2024 at 11 am. Left a voicemail informing pt the reason for the cancellation is because that provider will be out of clinic the whole week of 7/15/2024. Will try to call pt again to reschedule and confirm NP appt.

## 2024-07-10 NOTE — DISCHARGE INSTRUCTIONS
For scheduling: Call Tracey at [phone]    For questions or concerns call: KWAN MON-FRI 8 AM- 5PM [phone]. Radiology resident on call [phone].    For immediate concerns that are not emergent, you may call our radiology clinic at: [phone]

## 2024-07-12 ENCOUNTER — TELEPHONE (OUTPATIENT)
Dept: NEUROLOGY | Facility: CLINIC | Age: 74
End: 2024-07-12
Payer: MEDICARE

## 2024-07-12 ENCOUNTER — OUTPATIENT CASE MANAGEMENT (OUTPATIENT)
Dept: ADMINISTRATIVE | Facility: OTHER | Age: 74
End: 2024-07-12
Payer: MEDICARE

## 2024-07-12 ENCOUNTER — CLINICAL SUPPORT (OUTPATIENT)
Dept: REHABILITATION | Facility: OTHER | Age: 74
End: 2024-07-12
Payer: MEDICARE

## 2024-07-12 ENCOUNTER — CLINICAL SUPPORT (OUTPATIENT)
Dept: REHABILITATION | Facility: HOSPITAL | Age: 74
End: 2024-07-12
Payer: MEDICARE

## 2024-07-12 DIAGNOSIS — M25.521 ELBOW PAIN, RIGHT: Primary | ICD-10-CM

## 2024-07-12 DIAGNOSIS — R26.89 BALANCE PROBLEM: ICD-10-CM

## 2024-07-12 DIAGNOSIS — R68.89 DECREASED FUNCTIONAL ACTIVITY TOLERANCE: Primary | ICD-10-CM

## 2024-07-12 DIAGNOSIS — R29.898 DECREASED STRENGTH OF TRUNK AND BACK: ICD-10-CM

## 2024-07-12 PROCEDURE — 97110 THERAPEUTIC EXERCISES: CPT | Mod: HCNC,CQ

## 2024-07-12 PROCEDURE — 97112 NEUROMUSCULAR REEDUCATION: CPT | Mod: HCNC,CQ

## 2024-07-12 PROCEDURE — 97018 PARAFFIN BATH THERAPY: CPT | Mod: HCNC

## 2024-07-12 PROCEDURE — 97530 THERAPEUTIC ACTIVITIES: CPT | Mod: HCNC

## 2024-07-12 PROCEDURE — 97140 MANUAL THERAPY 1/> REGIONS: CPT | Mod: HCNC

## 2024-07-12 NOTE — PROGRESS NOTES
7/12/2024  1st attempt to complete Follow-Up  for Outpatient Care Management, left message. Will send portal message with unable to assess letter.

## 2024-07-12 NOTE — TELEPHONE ENCOUNTER
----- Message from Hillary Lyons sent at 7/11/2024  2:41 PM CDT -----  Type:  Patient Returning Call    Who Called:pt   Who Left Message for Patient:  Does the patient know what this is regarding?:missed call   Would the patient rather a call back or a response via [facility]? Call   Best Call Back Number:[phone]  Additional Information: states she received a missed call from office and states number given did not work

## 2024-07-12 NOTE — LETTER
Manisha Wynne  [address]  [place] [address]      Dear Manisha Wynne,     I am your nurse with [facility]s Outpatient [facility] Management Department. I was unsuccessful in reaching you today. At your earliest convenience, I would like to discuss your healthcare progress.      Please contact me at [phone] from 8:00AM to 4:30 PM on Monday thru Friday.     As you know, [facility] is a program offered to you through [facility] where a nurse is available 24/7 to answer questions or provide medical advice, their number is [phone].    Thanks,      Tamara Lopez, RN  Outpatient Care Management

## 2024-07-12 NOTE — PROGRESS NOTES
[facility]  Occupational Therapy Treatment Note    Date: 7/12/2024  Name: Manisha Wynne  Clinic Number: [id number]    Therapy Diagnosis:   1. Elbow pain, right          Medical Diagnosis: R Olecranon fracture with ORIF 2/28/24  ICD-10:   Z98.890 (ICD-10-CM) - Post-operative state   S52.021A (ICD-10-CM) - Closed fracture of olecranon process of right ulna, initial encounter         Physician: Sarmad Varghese PA-C; Dr. DESIREE Guevara   Physician Orders: Eval and Treat  R Elbow Olecranon ORIF  DOS: 2/28/24  12 Visits     Surgical Procedure and Date: 2/28/24,   Procedure(s) (LRB):  ORIF, ELBOW (Right)        Evaluation Date: 4/23/2024     Plan of Care Certification Period: 7/23/24     Date of Return to MD: 6/21/24     Visit # / Visits authorized: 14 / 20   Insurance Authorization Period Expiration: 12/31/24     FOTO #1: FSM 47% / GOAL 65%  FOTO #2: 5/22/24 FSM    52% / goal 65%   FOTO #3:      Precautions:  Standard and Weightbearing; Fall Risk      Time In: 8:30 am   Time Out: 915  Total Appointment Time (timed & untimed codes): 45 minutes    SUBJECTIVE   Patient casted 12 weeks, cast removed 5/10/24     Pt reports: I took a fall, I think on Monday 6/24/24, and had stitches in my head.  So everything is stiff; Patient with some bruising to triceps and posterior forearm.   She was compliant with home exercise program given last session.   Response to previous treatment:supination improved   Functional change: none     Pain: 4/10  Location: right elbow sore stiff      OBJECTIVE   Re-assess and upgrade POC next week    Objective Measures updated at progress report unless specified.    Edema. Measured in centimeters.    4/23/2024 5/22/24          Proximal Wrist Crease 15.8 cm =   Proximal Palmar Crease       MCPs            UE  ROM. Measured in degrees.    4/23/2024 5/15/24 6/17/2024           Wrist ext/flex  30 / 45  =/= 43/65   Sup/pro  70 / 70 75/90 80/90           Elbow ext / flex   52/122 25/ 130 20/145                 Sensation- intact      Special Tests: NT          Strength (Dyanmometer) and Pinch Strength (Pinch Gauge)  Measured in pounds and psi. Average of three trials.    6/17/2024 6/17/2024     Right Left   Rung II 34.2 41.4   Key Pinch 8 8   3pt Pinch 8.5 8   2pt Pinch 6 5     Treatment     Manisha received the treatments listed below:     Supervised Modalities after being cleared for contradictions: 10 min  paraffin to to R elbow and hand  x 10 min to increase blood flow, circulation and tissue elasticity prior to therex.     Manual therapy techniques: for 13 min (including Joint mobilizations, Myofacial release, Manual Lymphatic Drainage, Soft tissue Mobilization, and Friction Massage)  were applied to the R elbow  as follows:   - scar massage  to R elbow to decrease adhesions and improve tensile glide   - DTM to biceps, triceps and anti cubital fossa to decrease muscle tightness and guarding to facilitate elbow extension.      Therapeutic Activities to improve functional performance for 22 minutes, including:  - 2# wrist flex, ext ( in fishtail position), RD, UD x 20 reps each   - 1# elbow flex, extension palm up x 20 reps ; attempted 2# but unable to perform due to pain from falling  - - Yellow Putty: log roll with tripod pinch; gripping; extension blooms x 10 each    Patient Education and Home Exercises      Education provided:   - Cont HEP   - Progress towards goals     Written Home Exercises Provided: Patient instructed to cont prior HEP.  Exercises were reviewed and Manisha was able to demonstrate them prior to the end of the session.  Manisha demonstrated good  understanding of the HEP provided. See EMR under Patient Instructions for exercises provided during therapy sessions.       Assessment     Increased muscle soreness, pain, stiffness following recent fall.  Tolerated slight upgrade in wrist strengthening; elbow strength remains limited. Therapy to continue to focus  on end range of motion and strengthening for improved performance of functional activity.      Manisha is progressing well towards her goals and there are no updates to goals at this time. Pt prognosis is Good. Will progress with  and pinch strengthening.     Pt will continue to benefit from skilled outpatient occupational therapy to address the deficits listed in the problem list on initial evaluation provide pt/family education and to maximize pt's level of independence in the home and community environment.     Pt's spiritual, cultural and educational needs considered and pt agreeable to plan of care and goals.    Anticipated barriers to occupational therapy: none     The following goals were discussed with the patient and patient is in agreement with them as to be addressed in the treatment plan.      Short Term Goals: (6 weeks):  1)  Patient to be IND with HEP and modalities for pain management- met  2)  Increase ROM 10-20 degrees to increase functional hand use for ADLs/work/leisure activities- MET  3)   Decrease edema .1-.5mm to increase joint mobility /flexibility for functional hand use. - met  4)  Assess   and pinch and set goals accordingly MET     Long Term Goals :To be met by discharge (12 weeks)   1)  Increase  strength 2-8 lbs. For driving, cooking   2) Increase pinch strength 1-4 psi's for opening bottles and FM coordination   3)   Increase ROM 21-40 degrees to increase functional hand use for ADLs/work/leisure activities  4) Patient to score at _60_% OR MORE (FSM)  on FOTO to demonstrate improved perception of functional R hand  Use.     Plan   Certification Period/Plan of care expiration: 4/23/2024 to 7/23/24.    eDsi [place], OT  , CHT

## 2024-07-12 NOTE — PROGRESS NOTES
[facility] OUTPATIENT THERAPY AND WELLNESS - [facility]  Physical Therapy Treatment Note   Name: Manisha Wynne  Clinic Number: [id number]    Therapy Diagnosis:   Encounter Diagnoses   Name Primary?    Decreased functional activity tolerance Yes    Decreased strength of trunk and back     Balance problem            Physician: Nicolette Cheek MD    Visit Date: 7/12/2024    Physician Orders: PT Eval and Treat  Medical Diagnosis from Referral:   1. Decreased strength of trunk and back    2. Back pain, unspecified back location, unspecified back pain laterality, unspecified chronicity    3. Balance problem    4. Decreased functional activity tolerance       Evaluation Date: 4/24/2024  Authorization Period Expiration: 2/21/2025  Plan of Care Expiration: 7/24/2024  Reassessment Due: 5/24/2024  Visit # / Visits authorized: 16/20      Time In: 935 AM   Time Out: 1030 AM  Total Billable Time: 55 minutes   INSURANCE and OUTCOMES: Fee for Service with FOTO Outcomes 1/3     Precautions: standard, fall, R elbow fracture     Pattern of pain determined: 1 Movement responder    MedX Testing:MedX testing visit 1  Subjective   Manisha Wynne reports she was working until 1 am. She just came from hand therapy and she can't do much today with her arm today. We discussed focusing on lumbar and lower body today.     Patient reports tolerating previous visit well.   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  Leisure: used to walk with a friend,       Prior Level of Function: I with increased time  Current Level of Function: Difficulty with cleaning  DME owned/used: Cane, shower chair, hand held shower, grab bars  Gym Membership: No    Pt goals: Pts goals: Get healthy and  "get stronger   Objective      Lumbar  Isometric Testing on Med X equipment: Testing administered by PT    Test Initial Baseline Midpoint Final   Date 4/24/2024 6/12/2024    ROM 6-36 deg 0-57 deg    Max Peak Torque 80  113     Min Peak Torque 32  14     Flex/Ext Ratio 2.5 8.07:1     % variance  normative data 38 -11%     % change from initial test N/A visit 1 56%         Outcomes:  Intake Score: 24%  Visit 6 Score:   Visit 10 Score: 60%  Discharge Score:  Goal Score: 15%     Treatment     Manisha received the treatments listed below:      Medical MedX Treatment as follows:  MedX exercise started day 2:  Patient received neuromuscular education for 10 minutes via participation on the Medical MedX Machine. Therapist assisted patient in isolating and engaging spinal stabilization musculature in order to improve functional ability and postural control. Patient performed exercise with therapist guidance in order to accurately use pacer function, avoid valsalva, and optimally exert effort within a safe and effective range via the Ana Exertion Rating Scale. Patient instructed to perform at a midrange of exertion and to complete 15-20 repetitions within appropriate split time, with proper technique, and while maintaining safety.          7/12/2024    10:13 AM   [facility]   Visit Number 16   Time 5   Flexion in Lying 10   Lumbar Weight 54 lbs   Repetitions 20   Rating of Perceived Exertion 3   Ice - Z Lie (in min.) 5           -Re-integrated pacer and cue to maintain pace all the way through end range extension         Manisha participated in neuromuscular re-education activities to improve balance, coordination, proprioception, motor control and/or posture for 15 minutes. The following activities were included:    PPT x 5" x 10  PPT hooklying w/marching  x 10-  Bridge + blue TB  2x10,3"  TrA with SLR with ball  x10 ea    Manisha participated in therapeutic exercises to develop strength, endurance, ROM, " flexibility, posture, and core stabilization for 30  minutes including:    LTR x10  SKTC x10  EIS 2 x10      Peripheral muscle strengthening which included one set of 15-20 repetitions at a slow and controlled 10-13 second per rep pace focused on strengthening supporting musculature in order to improve body mechanics and functional mobility. Patient and therapist focused on proper form during treatment to ensure optimal strengthening of each targeted muscle group.  Machines utilized included:Torso rotation, Leg Ext, Leg Curl, and Leg Press  To be added next visit: Hip abduction       Manisha participated in dynamic functional therapeutic activities to improve functional performance and simulate household and community activities for 00minutes. The following activities were included:      Manisha received manual therapy techniques for 0 minutes. The following activities were included:    STM thoracolumbar parspinals  T4 PA glide Grade II  Lumbar rotation Grade III-IV mobs.    Pt given cold pack for 5 minutes to low back in z-lying  Patient Education and Home Exercises     Home exercises include:  LTR x10  SKTC x10  PPT x10  Bridges x10  EIS x10    Cardio program (V5): -  Lifting education (V11): -  Posture/Lumbar roll:   Fridge Magnet Discharge handout (date given): -  Equipment at home/gym membership:     Education provided:   - PT role and POC  - HEP review  - MedX and peripheral strengthening; purpose and form    Written Home Exercises Provided: yes.  Exercises were reviewed and Manisha was able to demonstrate them prior to the end of the session.  Manisha demonstrated good  understanding of the education provided.     See EMR under Patient Instructions for exercises provided prior visit.    Assessment   Manisha presents to therapy without c/o pain. Continued core and lumbo pelvic stability without issue and min cues. Due to pt just coming from hand therapy and at request of pt, UE peripherals deferred today. MedX  was performed at 54 ft lbs with 20 reps performed at an exertion rating of 3/10.       Patient is making good progress towards established goals.    Pt will continue to benefit from skilled outpatient physical therapy to address the deficits stated in the impairment chart, provide pt/family education and to maximize pt's level of independence in the home and community environment.     Anticipated Barriers for therapy: none  Pt's spiritual, cultural and educational needs considered and pt agreeable to plan of care and goals as stated below:     GOALS: Pt is in agreement with the following goals.     Short term goals:  6 weeks or 10 visits   - Pt will demonstrate increased lumbar ROM by at least 6 degrees from the initial ROM value with improvements noted in functional ROM and ability to perform ADLs. Appropriate and Ongoing  - Pt will demonstrate increased MedX average isometric strength value by 15% from initial test resulting in improved ability to perform bending, lifting, and carrying activities safely, confidently. Appropriate and Ongoing  - Pt will report a reduction in worst pain score by 1-2 points for improved tolerance for transfers sit to stand. Appropriate and Ongoing  - Pt able to perform HEP correctly with minimal cueing or supervision from therapist to encourage independent management of symptoms. Appropriate and Ongoing     Long term goals: 10 weeks or 20 visits   - Pt will demonstrate increased lumbar ROM by at least 12 degrees from initial ROM value, resulting in improved ability to perform functional forward bending while standing and sitting. Appropriate and Ongoing  - Pt will demonstrate increased MedX average isometric strength value by 25% from initial test resulting in improved ability to perform bending, lifting, and carrying activities safely and confidently. Appropriate and Ongoing  - Pt to demonstrate ability to independently control and reduce their pain through posture positioning and  mechanical movements throughout a typical day. Appropriate and Ongoing  - Pt will demonstrate reduced pain and improved functional outcomes as reported on the FOTO by reaching a limitation score of < or = 15% or less in order to demonstrate subjective improvement in pt's condition.   Appropriate and Ongoing  - Pt will demonstrate independence with the HEP at discharge. Appropriate and Ongoing  - Pt will be able to complete safe bending and lifting up to 10# without increased low back pain (patient goal) Appropriate and Ongoing    Plan     Continue with established Plan of Care towards established PT goals.     Therapist: Deandre Neil, PTA  07/12/2024

## 2024-07-12 NOTE — TELEPHONE ENCOUNTER
Called pt back at 12:45pm informed pt reasoning for cancellation and the next available appt will be in Oct. Pt stated she wanted an appt earlier. Informed her that is the only earlier appt. I also stated I can put her on the Wait list and I will discuss with his scheduling nurse if there anything available in sept. I let her she can get an update about rescheduling appt next week

## 2024-07-12 NOTE — TELEPHONE ENCOUNTER
Called pt in regard to appt cancel on 7/19/2024 at 11 am and inform her the reasoning for the cancellation. But before I can discuss anything pt stated she is on a called and wanted a call back.

## 2024-07-14 ENCOUNTER — PATIENT MESSAGE (OUTPATIENT)
Dept: REHABILITATION | Facility: OTHER | Age: 74
End: 2024-07-14
Payer: MEDICARE

## 2024-07-15 ENCOUNTER — OUTPATIENT CASE MANAGEMENT (OUTPATIENT)
Dept: ADMINISTRATIVE | Facility: OTHER | Age: 74
End: 2024-07-15
Payer: MEDICARE

## 2024-07-15 ENCOUNTER — CLINICAL SUPPORT (OUTPATIENT)
Dept: REHABILITATION | Facility: HOSPITAL | Age: 74
End: 2024-07-15
Payer: MEDICARE

## 2024-07-15 DIAGNOSIS — M25.521 ELBOW PAIN, RIGHT: Primary | ICD-10-CM

## 2024-07-15 PROCEDURE — 97530 THERAPEUTIC ACTIVITIES: CPT | Mod: HCNC

## 2024-07-15 PROCEDURE — 97140 MANUAL THERAPY 1/> REGIONS: CPT | Mod: HCNC

## 2024-07-15 NOTE — PROGRESS NOTES
[facility] OUTPATIENT THERAP[facility] AND [facility]LL[facility]  Occupational Therapy Treatment Note    Date: 7/15/2024  Name: Manisha Wynne  Clinic Number: [id number]    Therapy Diagnosis:   1. Elbow pain, right          Medical Diagnosis: R Olecranon fracture with ORIF 2/28/24  ICD-10:   Z98.890 (ICD-10-CM) - Post-operative state   S52.021A (ICD-10-CM) - Closed fracture of olecranon process of right ulna, initial encounter         Physician: Sarmad Varghese PA-C; Dr. DESIREE Guevara   Physician Orders: Eval and Treat  R Elbow Olecranon ORIF  DOS: 2/28/24  12 Visits     Surgical Procedure and Date: 2/28/24,   Procedure(s) (LRB):  ORIF, ELBOW (Right)        Evaluation Date: 4/23/2024     Plan of Care Certification Period: 7/23/24     Date of Return to MD: 6/21/24     Visit # / Visits authorized: 14 / 20   Insurance Authorization Period Expiration: 12/31/24     FOTO #1: FSM 47% / GOAL 65%  FOTO #2: 5/22/24 FSM    52% / goal 65%   FOTO #3:      Precautions:  Standard and Weightbearing; Fall Risk      Time In: 9:30 am   Time Out: 10:00 am  Total Appointment Time (timed & untimed codes): 30 minutes    SUBJECTIVE   Patient casted 12 weeks, cast removed 5/10/24     Pt reports: She has pain in her posterior elbow.   She was compliant with home exercise program given last session.   Response to previous treatment:supination improved   Functional change: none     Pain: 4/10  Location: right elbow sore stiff      OBJECTIVE   Re-assess and upgrade POC next week    Objective Measures updated at progress report unless specified.    Edema. Measured in centimeters.    4/23/2024 5/22/24          Proximal Wrist Crease 15.8 cm =   Proximal Palmar Crease       MCPs            UE  ROM. Measured in degrees.    4/23/2024 5/15/24 6/17/2024           Wrist ext/flex  30 / 45  =/= 43/65   Sup/pro  70 / 70 75/90 80/90           Elbow ext / flex  52/122 25/ 130 20/145                 Strength (Dyanmometer) and Pinch Strength (Pinch Gauge)  Measured in pounds  and psi. Average of three trials.    6/17/2024 6/17/2024     Right Left   Rung II 34.2 41.4   Key Pinch 8 8   3pt Pinch 8.5 8   2pt Pinch 6 5     Treatment     Manisha received the treatments listed below:     Supervised Modalities after being cleared for contradictions: 10 min  paraffin to to R elbow and hand  x 10 min to increase blood flow, circulation and tissue elasticity prior to therex.     Manual therapy techniques: for 10 min (including Joint mobilizations, Myofacial release, Manual Lymphatic Drainage, Soft tissue Mobilization, and Friction Massage)  were applied to the R elbow  as follows:   - scar massage  to R elbow to decrease adhesions and improve tensile glide   - DTM to biceps, triceps and anti cubital fossa to decrease muscle tightness and guarding to facilitate elbow extension.      Therapeutic Activities to improve functional performance for 10 minutes, including:  - 2# wrist flex, ext ( in fishtail position), RD, UD x 20 reps each (NT)  - 1# elbow flex, extension palm up x 20 reps ; attempted 2# but unable to perform due to pain from falling  - LLPS with 1# x 2'  - - Yellow Putty: log roll with tripod pinch; gripping; extension blooms x 10 each (NT)    Patient Education and Home Exercises      Education provided:   - Cont HEP   - Progress towards goals     Written Home Exercises Provided: Patient instructed to cont prior HEP.  Exercises were reviewed and Manisha was able to demonstrate them prior to the end of the session.  Manisha demonstrated good  understanding of the HEP provided. See EMR under Patient Instructions for exercises provided during therapy sessions.       Assessment     Increased muscle soreness, pain, stiffness following recent fall. Tolerated slight upgrade in wrist strengthening; elbow strength remains limited. Therapy to continue to focus on end range of motion and strengthening for improved performance of functional activity. Limited elbow extension.      Manisha is  progressing well towards her goals and there are no updates to goals at this time. Pt prognosis is Good. Will progress with  and pinch strengthening.     Pt will continue to benefit from skilled outpatient occupational therapy to address the deficits listed in the problem list on initial evaluation provide pt/family education and to maximize pt's level of independence in the home and community environment.     Pt's spiritual, cultural and educational needs considered and pt agreeable to plan of care and goals.    Anticipated barriers to occupational therapy: none     The following goals were discussed with the patient and patient is in agreement with them as to be addressed in the treatment plan.      Short Term Goals: (6 weeks):  1)  Patient to be IND with HEP and modalities for pain management- met  2)  Increase ROM 10-20 degrees to increase functional hand use for ADLs/work/leisure activities- MET  3)   Decrease edema .1-.5mm to increase joint mobility /flexibility for functional hand use. - met  4)  Assess   and pinch and set goals accordingly MET     Long Term Goals :To be met by discharge (12 weeks)   1)  Increase  strength 2-8 lbs. For driving, cooking   2) Increase pinch strength 1-4 psi's for opening bottles and FM coordination   3)   Increase ROM 21-40 degrees to increase functional hand use for ADLs/work/leisure activities  4) Patient to score at _60_% OR MORE (FSM)  on FOTO to demonstrate improved perception of functional R hand  Use.     Plan   Certification Period/Plan of care expiration: 4/23/2024 to 7/23/24.    Santiago Adams, OT

## 2024-07-16 ENCOUNTER — OUTPATIENT CASE MANAGEMENT (OUTPATIENT)
Dept: ADMINISTRATIVE | Facility: OTHER | Age: 74
End: 2024-07-16
Payer: MEDICARE

## 2024-07-16 NOTE — PROGRESS NOTES
Outpatient Care Management  Plan of Care Follow Up Visit    Patient: Manisha Wynne  MRN: [id number]  Date of Service: 07/16/2024  Completed by: Tamara Lopez RN  Referral Date: 03/29/2024    Reason for Visit   Patient presents with    OPCM RN Follow Up Call       Brief Summary: OPCM RN followed up with Rabbi Wynne today for care plan review.    Next Steps:   Review pain rating/progress with therapy.   Review if neuro appt was rescheduled.   Periodically review ability to perform activities of daily living and required amount of assistance needed for safety, optimal independence and self-care.  Rabbi Wynne agreed to OPCM RN follow up on or around 8/6/24.

## 2024-07-17 ENCOUNTER — CLINICAL SUPPORT (OUTPATIENT)
Dept: REHABILITATION | Facility: HOSPITAL | Age: 74
End: 2024-07-17
Payer: MEDICARE

## 2024-07-17 DIAGNOSIS — M25.521 ELBOW PAIN, RIGHT: Primary | ICD-10-CM

## 2024-07-17 PROCEDURE — 97018 PARAFFIN BATH THERAPY: CPT | Mod: HCNC

## 2024-07-17 PROCEDURE — 97530 THERAPEUTIC ACTIVITIES: CPT | Mod: HCNC

## 2024-07-17 NOTE — PROGRESS NOTES
[facility]  Occupational Therapy Treatment Note    Date: 7/17/2024  Name: Manisha Wynne  Clinic Number: [id number]    Therapy Diagnosis:   1. Elbow pain, right          Medical Diagnosis: R Olecranon fracture with ORIF 2/28/24  ICD-10:   Z98.890 (ICD-10-CM) - Post-operative state   S52.021A (ICD-10-CM) - Closed fracture of olecranon process of right ulna, initial encounter      Physician: Sarmad Varghese PA-C; Dr. DESIREE Guevara   Physician Orders: Eval and Treat  R Elbow Olecranon ORIF  DOS: 2/28/24  12 Visits     Surgical Procedure and Date: 2/28/24,   Procedure(s) (LRB):  ORIF, ELBOW (Right)        Evaluation Date: 4/23/2024     Plan of Care Certification Period: 7/23/24     Date of Return to MD: 9/10/24     Visit # / Visits authorized: 15/ 20   Insurance Authorization Period Expiration: 12/31/24     FOTO #1: FSM 47% / GOAL 65%  FOTO #2: 5/22/24 FSM    52% / goal 65%   FOTO #3:      Precautions:  Standard and Weightbearing; Fall Risk      Time In: 9:15 am   Time Out: 10:00 am  Total Appointment Time (timed & untimed codes): 45 minutes    SUBJECTIVE   Patient casted 12 weeks, cast removed 5/10/24     Pt reports: I'm doing good today, My wrist is doing great.  She has pain in her posterior elbow.   She was compliant with home exercise program given last session.   Response to previous treatment:supination improved   Functional change: none     Pain: 4/10  Location: right elbow sore stiff      OBJECTIVE   Re-assess and upgrade POC next week    Objective Measures updated at progress report unless specified.    Edema. Measured in centimeters.    4/23/2024 5/22/24          Proximal Wrist Crease 15.8 cm =   Proximal Palmar Crease       MCPs            UE  ROM. Measured in degrees.    4/23/2024 5/15/24 6/17/2024           Wrist ext/flex  30 / 45  =/= 43/65   Sup/pro  70 / 70 75/90 80/90           Elbow ext / flex  52/122 25/ 130 20/145                 Strength (Dyanmometer) and Pinch  Strength (Pinch Gauge)  Measured in pounds and psi. Average of three trials.    6/17/2024 6/17/2024     Right Left   Rung II 34.2 41.4   Key Pinch 8 8   3pt Pinch 8.5 8   2pt Pinch 6 5     Treatment     Manisha received the treatments listed below:     Supervised Modalities after being cleared for contradictions: 10 min  paraffin to to R elbow and hand  x 10 min to increase blood flow, circulation and tissue elasticity prior to therex.        Therapeutic Activities to improve functional performance for 35 minutes, including:  - 2# wrist flex, ext ( in fishtail position), RD, UD x 20 reps each   - 1# elbow flex, extension palm up x 20 reps ; attempted 2# but unable to perform due to pain from falling  - 1#  hammer for sup/pro x 10 reps   - - Yellow Putty: log roll with tripod pinch; gripping; extension blooms x 10 each (NT)    Patient Education and Home Exercises      Education provided:   - Cont HEP   - Progress towards goals     Written Home Exercises Provided: Patient instructed to cont prior HEP.  Exercises were reviewed and Manisha was able to demonstrate them prior to the end of the session.  Manisha demonstrated good  understanding of the HEP provided. See EMR under Patient Instructions for exercises provided during therapy sessions.       Assessment     Increased muscle soreness, pain, stiffness following recent fall. Tolerated slight upgrade in wrist strengthening; elbow strength remains limited. Therapy to continue to focus on end range of motion and strengthening for improved performance of functional activity. Limited elbow extension.      Manisha is progressing well towards her goals and there are no updates to goals at this time. Pt prognosis is Good. Will progress with  and pinch strengthening.     Pt will continue to benefit from skilled outpatient occupational therapy to address the deficits listed in the problem list on initial evaluation provide pt/family education and to maximize pt's level of  independence in the home and community environment.     Pt's spiritual, cultural and educational needs considered and pt agreeable to plan of care and goals.    Anticipated barriers to occupational therapy: none     The following goals were discussed with the patient and patient is in agreement with them as to be addressed in the treatment plan.      Short Term Goals: (6 weeks):  1)  Patient to be IND with HEP and modalities for pain management- met  2)  Increase ROM 10-20 degrees to increase functional hand use for ADLs/work/leisure activities- MET  3)   Decrease edema .1-.5mm to increase joint mobility /flexibility for functional hand use. - met  4)  Assess   and pinch and set goals accordingly MET     Long Term Goals :To be met by discharge (12 weeks)   1)  Increase  strength 2-8 lbs. For driving, cooking   2) Increase pinch strength 1-4 psi's for opening bottles and FM coordination   3)   Increase ROM 21-40 degrees to increase functional hand use for ADLs/work/leisure activities  4) Patient to score at _60_% OR MORE (FSM)  on FOTO to demonstrate improved perception of functional R hand  Use.     Plan   Certification Period/Plan of care expiration: 4/23/2024 to 7/23/24.    Desi Medellin, OT

## 2024-07-19 ENCOUNTER — TELEPHONE (OUTPATIENT)
Dept: NEUROLOGY | Facility: CLINIC | Age: 74
End: 2024-07-19
Payer: MEDICARE

## 2024-07-19 NOTE — TELEPHONE ENCOUNTER
Called and spoke to pt in regard to rescheduling appt, pt confirmed and agree to appt on October 28,2024 at 11am

## 2024-07-22 ENCOUNTER — CLINICAL SUPPORT (OUTPATIENT)
Dept: REHABILITATION | Facility: HOSPITAL | Age: 74
End: 2024-07-22
Payer: MEDICARE

## 2024-07-22 ENCOUNTER — CLINICAL SUPPORT (OUTPATIENT)
Dept: REHABILITATION | Facility: OTHER | Age: 74
End: 2024-07-22
Payer: MEDICARE

## 2024-07-22 DIAGNOSIS — R26.89 BALANCE PROBLEM: ICD-10-CM

## 2024-07-22 DIAGNOSIS — R29.898 DECREASED STRENGTH OF TRUNK AND BACK: ICD-10-CM

## 2024-07-22 DIAGNOSIS — R68.89 DECREASED FUNCTIONAL ACTIVITY TOLERANCE: Primary | ICD-10-CM

## 2024-07-22 DIAGNOSIS — M25.521 ELBOW PAIN, RIGHT: Primary | ICD-10-CM

## 2024-07-22 PROCEDURE — 97140 MANUAL THERAPY 1/> REGIONS: CPT | Mod: HCNC

## 2024-07-22 PROCEDURE — 97018 PARAFFIN BATH THERAPY: CPT | Mod: HCNC

## 2024-07-22 PROCEDURE — 97530 THERAPEUTIC ACTIVITIES: CPT | Mod: HCNC

## 2024-07-22 PROCEDURE — 97112 NEUROMUSCULAR REEDUCATION: CPT | Mod: HCNC

## 2024-07-22 NOTE — PROGRESS NOTES
[facility]  Occupational Therapy Treatment Note    Date: 7/22/2024  Name: Manisha Wynne  Clinic Number: [id number]    Therapy Diagnosis:   1. Elbow pain, right            Medical Diagnosis: R Olecranon fracture with ORIF 2/28/24  ICD-10:   Z98.890 (ICD-10-CM) - Post-operative state   S52.021A (ICD-10-CM) - Closed fracture of olecranon process of right ulna, initial encounter      Physician: Sarmad Varghese PA-C; Dr. DESIREE Guevara   Physician Orders: Eval and Treat  R Elbow Olecranon ORIF  DOS: 2/28/24  12 Visits     Surgical Procedure and Date: 2/28/24,   Procedure(s) (LRB):  ORIF, ELBOW (Right)        Evaluation Date: 4/23/2024     Plan of Care Certification Period: 7/23/24     Date of Return to MD: 9/10/24     Visit # / Visits authorized: 17/ 20   Insurance Authorization Period Expiration: 12/31/24     FOTO #1: FSM 47% / GOAL 65%  FOTO #2: 5/22/24 FSM    52% / goal 65%   FOTO #3:      Precautions:  Standard and Weightbearing; Fall Risk      Time In: 9:30am   Time Out: 10:15 am  Total Appointment Time (timed & untimed codes): 45 minutes    SUBJECTIVE   Patient casted 12 weeks, cast removed 5/10/24     Pt reports: I'm doing good today, My wrist is doing great.  She has pain in her posterior elbow.  I have difficulty with shifting gears in car.   She was compliant with home exercise program given last session.   Response to previous treatment:supination improved   Functional change: none     Pain: 4/10  Location: right elbow sore stiff      OBJECTIVE   Re-assess and upgrade on 7/24/24    Objective Measures updated at progress report unless specified.    Edema. Measured in centimeters.    4/23/2024 5/22/24          Proximal Wrist Crease 15.8 cm =   Proximal Palmar Crease       MCPs            UE  ROM. Measured in degrees.    4/23/2024 5/15/24 6/17/2024           Wrist ext/flex  30 / 45  =/= 43/65   Sup/pro  70 / 70 75/90 80/90           Elbow ext / flex  52/122 25/ 130 20/145                  Strength (Dyanmometer) and Pinch Strength (Pinch Gauge)  Measured in pounds and psi. Average of three trials.    6/17/2024 6/17/2024     Right Left   Rung II 34.2 41.4   Key Pinch 8 8   3pt Pinch 8.5 8   2pt Pinch 6 5     Treatment     Manisha received the treatments listed below:     Supervised Modalities after being cleared for contradictions: 10 min  paraffin to to R elbow and hand  x 10 min to increase blood flow, circulation and tissue elasticity prior to therex.     Manual therapy techniques: for 10 min (including Joint mobilizations, Myofacial release, Manual Lymphatic Drainage, Soft tissue Mobilization, and Friction Massage)  were applied to the R elbow  as follows:   - scar massage  to R elbow to decrease adhesions and improve tensile glide   - DTM to biceps, triceps and anti cubital fossa to decrease muscle tightness and guarding to facilitate elbow extension.      Therapeutic Activities to improve functional performance for 35 minutes, including:  - 2# wrist flex, ext ( in fishtail position), RD, UD x 20 reps each   - 1# elbow flex, extension palm up x 20 reps ;   - 1#  hammer for sup/pro x 10 reps   - - Red Putty: log roll with tripod pinch; gripping; extension blooms x 10 each   - large red powerweb for  and pull with left UE to simulate gear shift in car x 10 reps     Patient Education and Home Exercises      Education provided:   - Cont HEP   - Progress towards goals     Written Home Exercises Provided: Patient instructed to cont prior HEP.  Exercises were reviewed and Manisha was able to demonstrate them prior to the end of the session.  Manisha demonstrated good  understanding of the HEP provided. See EMR under Patient Instructions for exercises provided during therapy sessions.       Assessment     Increased muscle soreness, pain, stiffness following recent fall. Tolerated slight upgrade in wrist strengthening; elbow strength remains limited. Therapy to continue to focus on end  range of motion and strengthening for improved performance of functional activity. Limited elbow extension.      Manisha is progressing well towards her goals and there are no updates to goals at this time. Pt prognosis is Good. Will progress with  and pinch strengthening.     Pt will continue to benefit from skilled outpatient occupational therapy to address the deficits listed in the problem list on initial evaluation provide pt/family education and to maximize pt's level of independence in the home and community environment.     Pt's spiritual, cultural and educational needs considered and pt agreeable to plan of care and goals.    Anticipated barriers to occupational therapy: none     The following goals were discussed with the patient and patient is in agreement with them as to be addressed in the treatment plan.      Short Term Goals: (6 weeks):  1)  Patient to be IND with HEP and modalities for pain management- met  2)  Increase ROM 10-20 degrees to increase functional hand use for ADLs/work/leisure activities- MET  3)   Decrease edema .1-.5mm to increase joint mobility /flexibility for functional hand use. - met  4)  Assess   and pinch and set goals accordingly MET     Long Term Goals :To be met by discharge (12 weeks)   1)  Increase  strength 2-8 lbs. For driving, cooking   2) Increase pinch strength 1-4 psi's for opening bottles and FM coordination   3)   Increase ROM 21-40 degrees to increase functional hand use for ADLs/work/leisure activities  4) Patient to score at _60_% OR MORE (FSM)  on FOTO to demonstrate improved perception of functional R hand  Use.     Plan   Certification Period/Plan of care expiration: 4/23/2024 to 7/23/24.    Desi Medellin, OT , CHT

## 2024-07-22 NOTE — PROGRESS NOTES
[facility]  Physical Therapy Treatment Note   Name: Manisha Wynne  Clinic Number: [id number]    Therapy Diagnosis:   Encounter Diagnoses   Name Primary?    Decreased functional activity tolerance Yes    Decreased strength of trunk and back     Balance problem        Physician: Nicolette Cheek MD    Visit Date: 7/22/2024    Physician Orders: PT Eval and Treat  Medical Diagnosis from Referral:   1. Decreased strength of trunk and back    2. Back pain, unspecified back location, unspecified back pain laterality, unspecified chronicity    3. Balance problem    4. Decreased functional activity tolerance       Evaluation Date: 4/24/2024  Authorization Period Expiration: 2/21/2025  Plan of Care Expiration: 7/24/2024  Reassessment Due: 5/24/2024  Visit # / Visits authorized: 18/20      Time In: 10:30 AM  Time Out: 11:31 AM  Total Billable Time: 30 minutes   INSURANCE and OUTCOMES: Fee for Service with FOTO Outcomes 1/3     Precautions: standard, fall, R elbow fracture     Pattern of pain determined: 1 Movement responder    MedX Testing:MedX testing visit 1  Subjective   Manisha Wynne reports that she still gets most pain provocation when going to bed at the end of the day. She states that her exercises are going well.     Patient reports tolerating previous visit well.   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  Leisure: used to walk with a friend,       Prior Level of Function: I with increased time  Current Level of Function: Difficulty with cleaning  DME owned/used: Cane, shower chair, hand held shower, grab bars  Gym Membership: No    Pt goals: Pts goals: Get healthy and get stronger   Objective   "    Lumbar  Isometric Testing on Med X equipment: Testing administered by PT    Test Initial Baseline Midpoint Final   Date 4/24/2024 6/12/2024    ROM 6-36 deg 0-57 deg    Max Peak Torque 80  113     Min Peak Torque 32  14     Flex/Ext Ratio 2.5 8.07:1     % variance  normative data 38 -11%     % change from initial test N/A visit 1 56%         Outcomes:  Intake Score: 24%  Visit 6 Score:   Visit 10 Score: 60%  Discharge Score:  Goal Score: 15%     Treatment     Manisha received the treatments listed below:      Medical MedX Treatment as follows:  MedX exercise started day 2:  Patient received neuromuscular education for 0 minutes via participation on the Medical MedX Machine. Therapist assisted patient in isolating and engaging spinal stabilization musculature in order to improve functional ability and postural control. Patient performed exercise with therapist guidance in order to accurately use pacer function, avoid valsalva, and optimally exert effort within a safe and effective range via the Ana Exertion Rating Scale. Patient instructed to perform at a midrange of exertion and to complete 15-20 repetitions within appropriate split time, with proper technique, and while maintaining safety.      59/15    Manisha participated in neuromuscular re-education activities to improve balance, coordination, proprioception, motor control and/or posture for 23 minutes. The following activities were included:    PPT hooklying w/marching  x 10  Bridge + blue TB  2x10,3"  Bridge + unilateral hip ABD blue TB  x10,3"  TrA with SLR with ball  x10 ea  + TrA 90-90 march 2 x 10    NP: PPT x 5" x 10    Manisha participated in therapeutic exercises to develop strength, endurance, ROM, flexibility, posture, and core stabilization for 32 minutes including:    LTR x10  SKTC x10  EIS  x10      7/22/2024    10:44 AM   [facility]   Visit Number 17   VAS Pain Rating 3   Time 5   Flexion in Lying 10   Lumbar Weight 57 lbs "   Repetitions 15   Rating of Perceived Exertion 3   Ice - Z Lie (in min.) 5       Peripheral muscle strengthening which included one set of 15-20 repetitions at a slow and controlled 10-13 second per rep pace focused on strengthening supporting musculature in order to improve body mechanics and functional mobility. Patient and therapist focused on proper form during treatment to ensure optimal strengthening of each targeted muscle group.  Machines utilized included:Torso rotation, Leg Ext, Leg Curl, and Leg Press  To be added next visit: Hip abduction       Manisha participated in dynamic functional therapeutic activities to improve functional performance and simulate household and community activities for 00minutes. The following activities were included:      Manisha received manual therapy techniques for 0 minutes. The following activities were included:    NP: STM thoracolumbar parspinals  T4 PA glide Grade II  Lumbar rotation Grade III-IV mobs.    Pt given cold pack for 5 minutes to low back in z-lying  Patient Education and Home Exercises     Home exercises include:  LTR x10  SKTC x10  PPT x10  Bridges x10  EIS x10    Cardio program (V5): -  Lifting education (V11): -  Posture/Lumbar roll:   Fridge Magnet Discharge handout (date given): -  Equipment at home/gym membership:     Education provided:   - PT role and POC  - HEP review  - MedX and peripheral strengthening; purpose and form    Written Home Exercises Provided: yes.  Exercises were reviewed and Manisha was able to demonstrate them prior to the end of the session.  Manisha demonstrated good  understanding of the education provided.     See EMR under Patient Instructions for exercises provided prior visit.    Assessment   Patient presents reporting persistent low level back pain that is most prominent at the end of the day. In preparation for discharge, patient's HEP advanced. When trailing advanced non-weight bearing transverse abdominus strengthening  patient displays moderate endurance deficits. This is likely contributing to impaired core endurance at the day's end. Patient's Lumbar MedX progressed ~5% to 57 ft lbs with 15 reps performed at an exertion rating of 3/10. All peripheral strength exercises completed without adverse effects.    Patient is making good progress towards established goals.    Pt will continue to benefit from skilled outpatient physical therapy to address the deficits stated in the impairment chart, provide pt/family education and to maximize pt's level of independence in the home and community environment.     Anticipated Barriers for therapy: none  Pt's spiritual, cultural and educational needs considered and pt agreeable to plan of care and goals as stated below:     GOALS: Pt is in agreement with the following goals.     Short term goals:  6 weeks or 10 visits   - Pt will demonstrate increased lumbar ROM by at least 6 degrees from the initial ROM value with improvements noted in functional ROM and ability to perform ADLs. Appropriate and Ongoing  - Pt will demonstrate increased MedX average isometric strength value by 15% from initial test resulting in improved ability to perform bending, lifting, and carrying activities safely, confidently. Appropriate and Ongoing  - Pt will report a reduction in worst pain score by 1-2 points for improved tolerance for transfers sit to stand. Appropriate and Ongoing  - Pt able to perform HEP correctly with minimal cueing or supervision from therapist to encourage independent management of symptoms. Appropriate and Ongoing     Long term goals: 10 weeks or 20 visits   - Pt will demonstrate increased lumbar ROM by at least 12 degrees from initial ROM value, resulting in improved ability to perform functional forward bending while standing and sitting. Appropriate and Ongoing  - Pt will demonstrate increased MedX average isometric strength value by 25% from initial test resulting in improved ability to  perform bending, lifting, and carrying activities safely and confidently. Appropriate and Ongoing  - Pt to demonstrate ability to independently control and reduce their pain through posture positioning and mechanical movements throughout a typical day. Appropriate and Ongoing  - Pt will demonstrate reduced pain and improved functional outcomes as reported on the FOTO by reaching a limitation score of < or = 15% or less in order to demonstrate subjective improvement in pt's condition.   Appropriate and Ongoing  - Pt will demonstrate independence with the HEP at discharge. Appropriate and Ongoing  - Pt will be able to complete safe bending and lifting up to 10# without increased low back pain (patient goal) Appropriate and Ongoing    Plan     Continue with established Plan of Care towards established PT goals.     Therapist: Yana Valenzuela, PT

## 2024-07-24 ENCOUNTER — CLINICAL SUPPORT (OUTPATIENT)
Dept: REHABILITATION | Facility: OTHER | Age: 74
End: 2024-07-24
Payer: MEDICARE

## 2024-07-24 DIAGNOSIS — R68.89 DECREASED FUNCTIONAL ACTIVITY TOLERANCE: Primary | ICD-10-CM

## 2024-07-24 DIAGNOSIS — R29.898 DECREASED STRENGTH OF TRUNK AND BACK: ICD-10-CM

## 2024-07-24 DIAGNOSIS — R26.89 BALANCE PROBLEM: ICD-10-CM

## 2024-07-24 PROCEDURE — 97110 THERAPEUTIC EXERCISES: CPT | Mod: HCNC

## 2024-07-24 NOTE — PROGRESS NOTES
[facility]  Physical Therapy Treatment Note   Name: Manisha Wynne  Clinic Number: [id number]    Therapy Diagnosis:   Encounter Diagnoses   Name Primary?    Decreased functional activity tolerance Yes    Decreased strength of trunk and back     Balance problem        Physician: Nicolette Cheek MD    Visit Date: 7/24/2024    Physician Orders: PT Eval and Treat  Medical Diagnosis from Referral:   1. Decreased strength of trunk and back    2. Back pain, unspecified back location, unspecified back pain laterality, unspecified chronicity    3. Balance problem    4. Decreased functional activity tolerance       Evaluation Date: 4/24/2024  Authorization Period Expiration: 12/31/2024  Plan of Care Expiration: 8/17/2024  Reassessment Due: 8/17/2024  Visit # / Visits authorized: 18/20      Time In: 10:37 AM  Time Out: 11:40 AM  Total Billable Time: 50 minutes   INSURANCE and OUTCOMES: Fee for Service with FOTO Outcomes 1/3     Precautions: standard, fall, R elbow fracture     Pattern of pain determined: 1 Movement responder    MedX Testing:MedX testing visit 1  Subjective   Manisha Wynne reports that she still gets most pain provocation when going to bed at the end of the day. She states that her exercises are going well.     Patient reports tolerating previous visit well.   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  Leisure: used to walk with a friend,       Prior Level of Function: I with increased time  Current Level of Function: Difficulty with cleaning  DME owned/used: Cane, shower chair, hand held shower, grab bars  Gym Membership: No    Pt goals: Pts goals: Get healthy and get stronger   Objective   "    Lumbar  Isometric Testing on Med X equipment: Testing administered by PT    Test Initial Baseline Midpoint Final   Date 4/24/2024 6/12/2024    ROM 6-36 deg 0-57 deg    Max Peak Torque 80  113     Min Peak Torque 32  14     Flex/Ext Ratio 2.5 8.07:1     % variance  normative data 38 -11%     % change from initial test N/A visit 1 56%         Outcomes:  Intake Score: 24%  Visit 6 Score:   Visit 10 Score: 60%  Discharge Score:  Goal Score: 15%     Treatment     Manisha received the treatments listed below:      Medical MedX Treatment as follows:  MedX exercise started day 2:  Patient received neuromuscular education for 0 minutes via participation on the Medical MedX Machine. Therapist assisted patient in isolating and engaging spinal stabilization musculature in order to improve functional ability and postural control. Patient performed exercise with therapist guidance in order to accurately use pacer function, avoid valsalva, and optimally exert effort within a safe and effective range via the Ana Exertion Rating Scale. Patient instructed to perform at a midrange of exertion and to complete 15-20 repetitions within appropriate split time, with proper technique, and while maintaining safety.      59/15    Manisha participated in neuromuscular re-education activities to improve balance, coordination, proprioception, motor control and/or posture for 23 minutes. The following activities were included:    PPT hooklying w/marching  x 10  Bridge + blue TB  2x10,3"  Bridge + unilateral hip ABD blue TB  x10,3"  TrA with SLR with ball  x10 ea  TrA 90-90 march 2 x 10    NP: PPT x 5" x 10    Manisha participated in therapeutic exercises to develop strength, endurance, ROM, flexibility, posture, and core stabilization for 32 minutes including:    LTR x10  SKTC x10  EIS  x10        7/24/2024    11:00 AM   [facility]   Visit Number 18   VAS Pain Rating 3   Extension in Standing 10   Flexion in Lying 10   Lumbar Weight " 57 lbs   Repetitions 18   Rating of Perceived Exertion 3   Ice - Z Lie (in min.) 5         Peripheral muscle strengthening which included one set of 15-20 repetitions at a slow and controlled 10-13 second per rep pace focused on strengthening supporting musculature in order to improve body mechanics and functional mobility. Patient and therapist focused on proper form during treatment to ensure optimal strengthening of each targeted muscle group.  Machines utilized included:Torso rotation, Leg Ext, Leg Curl, and Leg Press  To be added next visit: Hip abduction       Manisha participated in dynamic functional therapeutic activities to improve functional performance and simulate household and community activities for 00minutes. The following activities were included:      Manisha received manual therapy techniques for 0 minutes. The following activities were included:    NP: STM thoracolumbar parspinals  T4 PA glide Grade II  Lumbar rotation Grade III-IV mobs.    Pt given cold pack for 5 minutes to low back in z-lying  Patient Education and Home Exercises     Home exercises include:  LTR x10  SKTC x10  PPT x10  Bridges x10  EIS x10    Cardio program (V5): -  Lifting education (V11): -  Posture/Lumbar roll:   Fridge Magnet Discharge handout (date given): - 7/24/2024  Equipment at home/gym membership:     Education provided:   - PT role and POC  - HEP review  - MedX and peripheral strengthening; purpose and form    Written Home Exercises Provided: yes.  Exercises were reviewed and Manisha was able to demonstrate them prior to the end of the session.  Manisha demonstrated good  understanding of the education provided.     See EMR under Patient Instructions for exercises provided prior visit.    Assessment     Patient presents reporting minimal low back pain at this time but yesterday afternoon she was doing a lot of standing/walking an unsupported sitting and later that night it aggravated her back. She did he stretches  at night but has a restless sleep. This morning she is feeling better. Continued to review HEP and given band and fridge magnet for home. Patient's Lumbar MedX maintained 57 ft lbs with 18 reps performed at an exertion rating of 3/10. All peripheral strength exercises completed without adverse effects.    Patient is making good progress towards established goals.    Pt will continue to benefit from skilled outpatient physical therapy to address the deficits stated in the impairment chart, provide pt/family education and to maximize pt's level of independence in the home and community environment.     Anticipated Barriers for therapy: none  Pt's spiritual, cultural and educational needs considered and pt agreeable to plan of care and goals as stated below:     GOALS: Pt is in agreement with the following goals.     Short term goals:  6 weeks or 10 visits   - Pt will demonstrate increased lumbar ROM by at least 6 degrees from the initial ROM value with improvements noted in functional ROM and ability to perform ADLs. Appropriate and Ongoing  - Pt will demonstrate increased MedX average isometric strength value by 15% from initial test resulting in improved ability to perform bending, lifting, and carrying activities safely, confidently. Appropriate and Ongoing  - Pt will report a reduction in worst pain score by 1-2 points for improved tolerance for transfers sit to stand. Appropriate and Ongoing  - Pt able to perform HEP correctly with minimal cueing or supervision from therapist to encourage independent management of symptoms. Appropriate and Ongoing     Long term goals: 10 weeks or 20 visits   - Pt will demonstrate increased lumbar ROM by at least 12 degrees from initial ROM value, resulting in improved ability to perform functional forward bending while standing and sitting. Appropriate and Ongoing  - Pt will demonstrate increased MedX average isometric strength value by 25% from initial test resulting in improved  ability to perform bending, lifting, and carrying activities safely and confidently. Appropriate and Ongoing  - Pt to demonstrate ability to independently control and reduce their pain through posture positioning and mechanical movements throughout a typical day. Appropriate and Ongoing  - Pt will demonstrate reduced pain and improved functional outcomes as reported on the FOTO by reaching a limitation score of < or = 15% or less in order to demonstrate subjective improvement in pt's condition.   Appropriate and Ongoing  - Pt will demonstrate independence with the HEP at discharge. Appropriate and Ongoing  - Pt will be able to complete safe bending and lifting up to 10# without increased low back pain (patient goal) Appropriate and Ongoing    Plan     Continue with established Plan of Care towards established PT goals.     Therapist: Jerrod Walker, PT  Date: 7/24/2024

## 2024-07-24 NOTE — PLAN OF CARE
[facility]  Physical Therapy Treatment Note   Name: Manisha Wynne  Clinic Number: [id number]    Therapy Diagnosis:   Encounter Diagnoses   Name Primary?    Decreased functional activity tolerance Yes    Decreased strength of trunk and back     Balance problem        Physician: Nicolette Cheek MD    Visit Date: 7/24/2024    Physician Orders: PT Eval and Treat  Medical Diagnosis from Referral:   1. Decreased strength of trunk and back    2. Back pain, unspecified back location, unspecified back pain laterality, unspecified chronicity    3. Balance problem    4. Decreased functional activity tolerance       Evaluation Date: 4/24/2024  Authorization Period Expiration: 12/31/2024  Plan of Care Expiration: 8/17/2024  Reassessment Due: 8/17/2024  Visit # / Visits authorized: 18/20      Time In: 10:37 AM  Time Out: 11:40 AM  Total Billable Time: 50 minutes   INSURANCE and OUTCOMES: Fee for Service with FOTO Outcomes 1/3     Precautions: standard, fall, R elbow fracture     Pattern of pain determined: 1 Movement responder    MedX Testing:MedX testing visit 1  Subjective   Manisha Wynne reports that she still gets most pain provocation when going to bed at the end of the day. She states that her exercises are going well.     Patient reports tolerating previous visit well.   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  Leisure: used to walk with a friend,       Prior Level of Function: I with increased time  Current Level of Function: Difficulty with cleaning  DME owned/used: Cane, shower chair, hand held shower, grab bars  Gym Membership: No    Pt goals: Pts goals: Get healthy and get stronger   Objective       Lumbar  Isometric Testing on Med X equipment: Testing administered by PT    Test Initial Baseline Midpoint Final   Date 4/24/2024 6/12/2024    ROM 6-36 deg 0-57 deg    Max Peak Torque 80  113     Min Peak Torque 32  14     Flex/Ext Ratio 2.5 8.07:1     % variance  normative data 38 -11%     % change from initial test N/A visit 1 56%         Outcomes:  Intake Score: 24%  Visit 6 Score:   Visit 10 Score: 60%  Discharge Score:  Goal Score: 15%     Assessment     Patient presents reporting minimal low back pain at this time but yesterday afternoon she was doing a lot of standing/walking an unsupported sitting and later that night it aggravated her back. She did he stretches at night but has a restless sleep. This morning she is feeling better. Continued to review HEP and given band and fridge magnet for home. Patient's Lumbar MedX maintained 57 ft lbs with 18 reps performed at an exertion rating of 3/10. All peripheral strength exercises completed without adverse effects.    Patient is making good progress towards established goals.    Pt will continue to benefit from skilled outpatient physical therapy to address the deficits stated in the impairment chart, provide pt/family education and to maximize pt's level of independence in the home and community environment.     Anticipated Barriers for therapy: none  Pt's spiritual, cultural and educational needs considered and pt agreeable to plan of care and goals as stated below:     GOALS: Pt is in agreement with the following goals.     Short term goals:  6 weeks or 10 visits   - Pt will demonstrate increased lumbar ROM by at least 6 degrees from the initial ROM value with improvements noted in functional ROM and ability to perform ADLs. Met 6/12/2024  - Pt will demonstrate increased MedX average isometric strength value by 15% from initial test resulting in improved ability to perform bending, lifting, and carrying activities safely, confidently. Met 6/12/2024  - Pt  will report a reduction in worst pain score by 1-2 points for improved tolerance for transfers sit to stand. Met 6/12/2024  - Pt able to perform HEP correctly with minimal cueing or supervision from therapist to encourage independent management of symptoms. Met 6/12/2024     Long term goals: 10 weeks or 20 visits   - Pt will demonstrate increased lumbar ROM by at least 12 degrees from initial ROM value, resulting in improved ability to perform functional forward bending while standing and sitting. Appropriate and Ongoing  - Pt will demonstrate increased MedX average isometric strength value by 25% from initial test resulting in improved ability to perform bending, lifting, and carrying activities safely and confidently. Met 6/12/2024  - Pt to demonstrate ability to independently control and reduce their pain through posture positioning and mechanical movements throughout a typical day. Appropriate and Ongoing  - Pt will demonstrate reduced pain and improved functional outcomes as reported on the FOTO by reaching a limitation score of < or = 15% or less in order to demonstrate subjective improvement in pt's condition.   Appropriate and Ongoing  - Pt will demonstrate independence with the HEP at discharge. Appropriate and Ongoing  - Pt will be able to complete safe bending and lifting up to 10# without increased low back pain (patient goal) Appropriate and Ongoing    Plan     Continue with established Plan of Care towards established PT goals.     Therapist: Jerrod Walker, PT  Date: 7/24/2024

## 2024-07-25 LAB
OHS CV AF BURDEN PERCENT: < 1
OHS CV DC REMOTE DEVICE TYPE: NORMAL
OHS CV ICD SHOCK: NO
OHS CV RV PACING PERCENT: 4 %

## 2024-07-29 ENCOUNTER — CLINICAL SUPPORT (OUTPATIENT)
Dept: REHABILITATION | Facility: HOSPITAL | Age: 74
End: 2024-07-29
Payer: MEDICARE

## 2024-07-29 DIAGNOSIS — M25.521 ELBOW PAIN, RIGHT: Primary | ICD-10-CM

## 2024-07-29 PROCEDURE — 97140 MANUAL THERAPY 1/> REGIONS: CPT | Mod: HCNC

## 2024-07-29 PROCEDURE — 97530 THERAPEUTIC ACTIVITIES: CPT | Mod: HCNC

## 2024-07-29 NOTE — PROGRESS NOTES
[facility]  Occupational Therapy Treatment Note    Date: 7/29/2024  Name: Manisha Wynne  Clinic Number: [id number]    Therapy Diagnosis: R olecranon Fx w/ ORIF 2/28/2024  1. Elbow pain, right            Medical Diagnosis: R Olecranon fracture with ORIF 2/28/24  ICD-10:   Z98.890 (ICD-10-CM) - Post-operative state   S52.021A (ICD-10-CM) - Closed fracture of olecranon process of right ulna, initial encounter      Physician: Sarmad Varghese PA-C; Dr. DESIREE Guevara   Physician Orders: Eval and Treat  R Elbow Olecranon ORIF  DOS: 2/28/24  12 Visits     Surgical Procedure and Date: 2/28/24,   Procedure(s) (LRB):  ORIF, ELBOW (Right)        Evaluation Date: 4/23/2024     Plan of Care Certification Period: 8/23/24     Date of Return to MD: 9/10/24     Visit # / Visits authorized: 18/ 20   Insurance Authorization Period Expiration: 12/31/24     FOTO #1: FSM 47% / GOAL 65%  FOTO #2: 5/22/24 FSM    52% / goal 65%   FOTO #3:      Precautions:  Standard and Weightbearing; Fall Risk      Time In: 9:26 am   Time Out: 10:00 am  Total Appointment Time (timed & untimed codes): 34 minutes    SUBJECTIVE   Patient casted 12 weeks, cast removed 5/10/24     Pt reports: She has 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   She was compliant with home exercise program given last session.   Response to previous treatment:supination improved   Functional change: none     Pain: 4/10  Location: right elbow sore stiff      OBJECTIVE   Re-assess and upgrade on 7/24/24    Objective Measures updated at progress report unless specified.    Edema. Measured in centimeters.    4/23/2024 5/22/24          Proximal Wrist Crease 15.8 cm =   Proximal Palmar Crease       MCPs            UE  ROM. Measured in degrees.    4/23/2024 5/15/24 6/17/2024           Wrist ext/flex  30 / 45  =/= 43/65   Sup/pro  70 / 70 75/90 80/90           Elbow ext / flex  52/122 25/ 130 20/145                 Strength (Dyanmometer) and  "Pinch Strength (Pinch Gauge)  Measured in pounds and psi. Average of three trials.    6/17/2024 6/17/2024     Right Left   Rung II 34.2 41.4   Key Pinch 8 8   3pt Pinch 8.5 8   2pt Pinch 6 5     Treatment     Manisha received the treatments listed below:     Supervised Modalities after being cleared for contradictions: 10 min  paraffin to to R elbow and hand  x 10 min to increase blood flow, circulation and tissue elasticity prior to therex.     Manual therapy techniques: for 10 min (including Joint mobilizations, Myofacial release, Manual Lymphatic Drainage, Soft tissue Mobilization, and Friction Massage)  were applied to the R elbow  as follows:   - scar massage  to R elbow to decrease adhesions and improve tensile glide   - DTM to biceps, triceps and anti cubital fossa to decrease muscle tightness and guarding to facilitate elbow extension.      Therapeutic Activities to improve functional performance for 14 minutes, including:  - 2# wrist flex, ext ( in fishtail position), RD, UD x 20 reps each (NT)  - 1# elbow flex, extension palm up x 20 reps ;  - 1#  hammer for sup/pro x 10 reps   - Biceps stretch (in sitting): 3 x 30"  - - Red Putty: log roll with tripod pinch; gripping; extension blooms x 10 each (NT)  - large red powerweb for  and pull with left UE to simulate gear shift in car x 10 reps     Patient Education and Home Exercises      Education provided:   - Cont HEP   - Progress towards goals     Written Home Exercises Provided: Patient instructed to cont prior HEP.  Exercises were reviewed and Manisha was able to demonstrate them prior to the end of the session.  Manisha demonstrated good  understanding of the HEP provided. See EMR under Patient Instructions for exercises provided during therapy sessions.       Assessment     Increased muscle soreness, pain, stiffness following recent fall. Elbow strength remains limited. Limited elbow extension with noted cord at the biceps insertion, which is " consistent with pain around the medial epicondylitis that was reported by the patient. Therapy to continue to focus on end range of motion and strengthening for improved performance of functional activity.     Manisha is progressing well towards her goals and there are no updates to goals at this time. Pt prognosis is Good. Will progress with  and pinch strengthening.     Pt will continue to benefit from skilled outpatient occupational therapy to address the deficits listed in the problem list on initial evaluation provide pt/family education and to maximize pt's level of independence in the home and community environment.     Pt's spiritual, cultural and educational needs considered and pt agreeable to plan of care and goals.    Anticipated barriers to occupational therapy: none     The following goals were discussed with the patient and patient is in agreement with them as to be addressed in the treatment plan.      Short Term Goals: (6 weeks):  1)  Patient to be IND with HEP and modalities for pain management- met  2)  Increase ROM 10-20 degrees to increase functional hand use for ADLs/work/leisure activities- MET  3)   Decrease edema .1-.5mm to increase joint mobility /flexibility for functional hand use. - met  4)  Assess   and pinch and set goals accordingly MET     Long Term Goals :To be met by discharge (12 weeks)   1)  Increase  strength 2-8 lbs. For driving, cooking   2) Increase pinch strength 1-4 psi's for opening bottles and FM coordination   3)   Increase ROM 21-40 degrees to increase functional hand use for ADLs/work/leisure activities  4) Patient to score at _60_% OR MORE (FSM)  on FOTO to demonstrate improved perception of functional R hand  Use.     Plan   Certification Period/Plan of care expiration: 4/23/2024 to 8/23/24.    Santiago Adams, OT

## 2024-07-30 ENCOUNTER — TELEPHONE (OUTPATIENT)
Dept: NEUROSURGERY | Facility: CLINIC | Age: 74
End: 2024-07-30
Payer: MEDICARE

## 2024-07-30 ENCOUNTER — CLINICAL SUPPORT (OUTPATIENT)
Dept: REHABILITATION | Facility: OTHER | Age: 74
End: 2024-07-30
Payer: MEDICARE

## 2024-07-30 DIAGNOSIS — R68.89 DECREASED FUNCTIONAL ACTIVITY TOLERANCE: Primary | ICD-10-CM

## 2024-07-30 DIAGNOSIS — R29.898 DECREASED STRENGTH OF TRUNK AND BACK: ICD-10-CM

## 2024-07-30 DIAGNOSIS — R26.89 BALANCE PROBLEM: ICD-10-CM

## 2024-07-30 PROCEDURE — 97110 THERAPEUTIC EXERCISES: CPT | Mod: HCNC,CQ

## 2024-07-30 PROCEDURE — 97112 NEUROMUSCULAR REEDUCATION: CPT | Mod: HCNC,CQ

## 2024-07-30 NOTE — PROGRESS NOTES
[facility] OUTPATIENT THERAPY AND WELLNESS - HEALTHY BACK  Physical Therapy Treatment Note   Name: Manisha Wynne  Clinic Number: [id number]    Therapy Diagnosis:   Encounter Diagnoses   Name Primary?    Decreased functional activity tolerance Yes    Decreased strength of trunk and back     Balance problem        Physician: Nicolette Cheek MD    Visit Date: 7/30/2024    Physician Orders: PT Eval and Treat  Medical Diagnosis from Referral:   1. Decreased strength of trunk and back    2. Back pain, unspecified back location, unspecified back pain laterality, unspecified chronicity    3. Balance problem    4. Decreased functional activity tolerance       Evaluation Date: 4/24/2024  Authorization Period Expiration: 12/31/2024  Plan of Care Expiration: 8/17/2024  Reassessment Due: 8/17/2024  Visit # / Visits authorized: 19/20      Time In: 10:05 AM  Time Out: 11:10 AM  Total Billable Time: 55 minutes   INSURANCE and OUTCOMES: Fee for Service with FOTO Outcomes 1/3     Precautions: standard, fall, R elbow fracture     Pattern of pain determined: 1 Movement responder    MedX Testing:MedX testing visit 1  Subjective   Manisha Wynne reports moderate pain mostly with car transfers. Her (L) sciatic nerve symptoms are inflamed. She denies performing any activities to cause exacerbated symptoms.     Patient reports tolerating previous visit well.   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  Leisure: used to walk with a friend,       Prior Level of Function: I with increased time  Current Level of Function: Difficulty with cleaning  DME owned/used: Cane, shower chair, hand held shower, grab bars  Gym Membership: No    Pt goals: Pts goals: Get healthy and get  "stronger   Objective      Lumbar  Isometric Testing on Med X equipment: Testing administered by PT    Test Initial Baseline Midpoint Final   Date 4/24/2024 6/12/2024    ROM 6-36 deg 0-57 deg    Max Peak Torque 80  113     Min Peak Torque 32  14     Flex/Ext Ratio 2.5 8.07:1     % variance  normative data 38 -11%     % change from initial test N/A visit 1 56%         Outcomes:  Intake Score: 24%  Visit 6 Score:   Visit 10 Score: 60%  Discharge Score:  Goal Score: 15%     Treatment     Manisha received the treatments listed below:      Medical MedX Treatment as follows:  MedX exercise started day 2:  Patient received neuromuscular education for 0 minutes via participation on the Medical MedX Machine. Therapist assisted patient in isolating and engaging spinal stabilization musculature in order to improve functional ability and postural control. Patient performed exercise with therapist guidance in order to accurately use pacer function, avoid valsalva, and optimally exert effort within a safe and effective range via the Ana Exertion Rating Scale. Patient instructed to perform at a midrange of exertion and to complete 15-20 repetitions within appropriate split time, with proper technique, and while maintaining safety.        Manisha participated in neuromuscular re-education activities to improve balance, coordination, proprioception, motor control and/or posture for 23 minutes. The following activities were included:    PPT hooklying w/marching  x 10  Bridge + blue TB  2x10,3"-NP  Bridge + unilateral hip ABD blue TB  x10,3"-  TrA with SLR with ball  x10 ea  TrA 90-90 march 2 x 10-NP  Supine nerve glides 10x 2       NP: PPT x 5" x 10    Manisha participated in therapeutic exercises to develop strength, endurance, ROM, flexibility, posture, and core stabilization for 32 minutes including:    LTR x10  SKTC x10  EIS  x10      7/30/2024    10:39 AM   [facility]   Visit Number 19   VAS Pain Rating 5   Time 5 "   Extension in Standing 10   Flexion in Lying 10   Lumbar Weight 57 lbs   Repetitions 20   Rating of Perceived Exertion 4   Ice - Z Lie (in min.) 5              Peripheral muscle strengthening which included one set of 15-20 repetitions at a slow and controlled 10-13 second per rep pace focused on strengthening supporting musculature in order to improve body mechanics and functional mobility. Patient and therapist focused on proper form during treatment to ensure optimal strengthening of each targeted muscle group.  Machines utilized included:Torso rotation, Leg Ext, Leg Curl, and Leg Press, Rows, Triceps, leg press, and Hip abduction       Manisha participated in dynamic functional therapeutic activities to improve functional performance and simulate household and community activities for 00minutes. The following activities were included:      Manisha received manual therapy techniques for 0 minutes. The following activities were included:    NP: STM thoracolumbar parspinals  T4 PA glide Grade II  Lumbar rotation Grade III-IV mobs.    Pt given cold pack for 5 minutes to low back in z-lying  Patient Education and Home Exercises     Home exercises include:  LTR x10  SKTC x10  PPT x10  Bridges x10  EIS x10    Cardio program (V5): -  Lifting education (V11): -  Posture/Lumbar roll:   Fridge Magnet Discharge handout (date given): - 7/24/2024  Equipment at home/gym membership:     Education provided:   - PT role and POC  - HEP review  - MedX and peripheral strengthening; purpose and form    Written Home Exercises Provided: yes.  Exercises were reviewed and Manisha was able to demonstrate them prior to the end of the session.  Manisha demonstrated good  understanding of the education provided.     See EMR under Patient Instructions for exercises provided prior visit.    Assessment     Patient presents to therapy with c/o L LE involvement today. She was able to perform therex with min cues provided for proper technique.  Good response to introduced supine nerve glides to address radicular in L LE.  Patient's Lumbar MedX maintained 57 ft lbs with 20 reps performed at an exertion rating of 4/10. Peripheral strength exercises completed without adverse effects.    Patient is making good progress towards established goals.    Pt will continue to benefit from skilled outpatient physical therapy to address the deficits stated in the impairment chart, provide pt/family education and to maximize pt's level of independence in the home and community environment.     Anticipated Barriers for therapy: none  Pt's spiritual, cultural and educational needs considered and pt agreeable to plan of care and goals as stated below:     GOALS: Pt is in agreement with the following goals.     Short term goals:  6 weeks or 10 visits   - Pt will demonstrate increased lumbar ROM by at least 6 degrees from the initial ROM value with improvements noted in functional ROM and ability to perform ADLs. Appropriate and Ongoing  - Pt will demonstrate increased MedX average isometric strength value by 15% from initial test resulting in improved ability to perform bending, lifting, and carrying activities safely, confidently. Appropriate and Ongoing  - Pt will report a reduction in worst pain score by 1-2 points for improved tolerance for transfers sit to stand. Appropriate and Ongoing  - Pt able to perform HEP correctly with minimal cueing or supervision from therapist to encourage independent management of symptoms. Appropriate and Ongoing     Long term goals: 10 weeks or 20 visits   - Pt will demonstrate increased lumbar ROM by at least 12 degrees from initial ROM value, resulting in improved ability to perform functional forward bending while standing and sitting. Appropriate and Ongoing  - Pt will demonstrate increased MedX average isometric strength value by 25% from initial test resulting in improved ability to perform bending, lifting, and carrying activities  safely and confidently. Appropriate and Ongoing  - Pt to demonstrate ability to independently control and reduce their pain through posture positioning and mechanical movements throughout a typical day. Appropriate and Ongoing  - Pt will demonstrate reduced pain and improved functional outcomes as reported on the FOTO by reaching a limitation score of < or = 15% or less in order to demonstrate subjective improvement in pt's condition.   Appropriate and Ongoing  - Pt will demonstrate independence with the HEP at discharge. Appropriate and Ongoing  - Pt will be able to complete safe bending and lifting up to 10# without increased low back pain (patient goal) Appropriate and Ongoing    Plan     Continue with established Plan of Care towards established PT goals.     Therapist: Thao Fisher, PTA  Date: 7/30/2024

## 2024-07-30 NOTE — PROGRESS NOTES
[facility]  Physical Therapy Treatment Note   Name: Manisha Wynne  Clinic Number: [id number]    Therapy Diagnosis:   Encounter Diagnoses   Name Primary?    Decreased functional activity tolerance Yes    Decreased strength of trunk and back     Balance problem        Physician: Nicolette Cheek MD    Visit Date: 7/30/2024    Physician Orders: PT Eval and Treat  Medical Diagnosis from Referral:   1. Decreased strength of trunk and back    2. Back pain, unspecified back location, unspecified back pain laterality, unspecified chronicity    3. Balance problem    4. Decreased functional activity tolerance       Evaluation Date: 4/24/2024  Authorization Period Expiration: 12/31/2024  Plan of Care Expiration: 8/17/2024  Reassessment Due: 8/17/2024  Visit # / Visits authorized: 19/20      Time In: 10:37 AM  Time Out: 11:40 AM  Total Billable Time: 50 minutes ***  INSURANCE and OUTCOMES: Fee for Service with FOTO Outcomes 1/3     Precautions: standard, fall, R elbow fracture     Pattern of pain determined: 1 Movement responder    MedX Testing:MedX testing visit 1  Subjective   Manisha Wynne reports moderate pain mostly with car transfers. Her (L) sciatic nerve symptoms are inflamed.     Patient reports tolerating previous visit well.   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  Leisure: used to walk with a friend,       Prior Level of Function: I with increased time  Current Level of Function: Difficulty with cleaning  DME owned/used: Cane, shower chair, hand held shower, grab bars  Gym Membership: No    Pt goals: Pts goals: Get healthy and get stronger   Objective      Lumbar  Isometric Testing on Med X  "equipment: Testing administered by PT    Test Initial Baseline Midpoint Final   Date 4/24/2024 6/12/2024    ROM 6-36 deg 0-57 deg    Max Peak Torque 80  113     Min Peak Torque 32  14     Flex/Ext Ratio 2.5 8.07:1     % variance  normative data 38 -11%     % change from initial test N/A visit 1 56%         Outcomes:  Intake Score: 24%  Visit 6 Score:   Visit 10 Score: 60%  Discharge Score:  Goal Score: 15%     Treatment     Manisha received the treatments listed below:      Medical MedX Treatment as follows:  MedX exercise started day 2:  Patient received neuromuscular education for 0 minutes via participation on the Medical MedX Machine. Therapist assisted patient in isolating and engaging spinal stabilization musculature in order to improve functional ability and postural control. Patient performed exercise with therapist guidance in order to accurately use pacer function, avoid valsalva, and optimally exert effort within a safe and effective range via the Ana Exertion Rating Scale. Patient instructed to perform at a midrange of exertion and to complete 15-20 repetitions within appropriate split time, with proper technique, and while maintaining safety.      59/15    Manisha participated in neuromuscular re-education activities to improve balance, coordination, proprioception, motor control and/or posture for 23 minutes. The following activities were included:    PPT hooklying w/marching  x 10  Bridge + blue TB  2x10,3"  Bridge + unilateral hip ABD blue TB  x10,3"  TrA with SLR with ball  x10 ea  TrA 90-90 march 2 x 10    NP: PPT x 5" x 10    Manisha participated in therapeutic exercises to develop strength, endurance, ROM, flexibility, posture, and core stabilization for 32 minutes including:    LTR x10  SKTC x10  EIS  x10    ***.hbt        Peripheral muscle strengthening which included one set of 15-20 repetitions at a slow and controlled 10-13 second per rep pace focused on strengthening supporting musculature " in order to improve body mechanics and functional mobility. Patient and therapist focused on proper form during treatment to ensure optimal strengthening of each targeted muscle group.  Machines utilized included:Torso rotation, Leg Ext, Leg Curl, and Leg Press  To be added next visit: Hip abduction       Manisha participated in dynamic functional therapeutic activities to improve functional performance and simulate household and community activities for 00minutes. The following activities were included:      Manisha received manual therapy techniques for 0 minutes. The following activities were included:    NP: STM thoracolumbar parspinals  T4 PA glide Grade II  Lumbar rotation Grade III-IV mobs.    Pt given cold pack for 5 minutes to low back in z-lying  Patient Education and Home Exercises     Home exercises include:  LTR x10  SKTC x10  PPT x10  Bridges x10  EIS x10    Cardio program (V5): -  Lifting education (V11): -  Posture/Lumbar roll:   Fridge Magnet Discharge handout (date given): - 7/24/2024  Equipment at home/gym membership:     Education provided:   - PT role and POC  - HEP review  - MedX and peripheral strengthening; purpose and form    Written Home Exercises Provided: yes.  Exercises were reviewed and Manisha was able to demonstrate them prior to the end of the session.  Manisha demonstrated good  understanding of the education provided.     See EMR under Patient Instructions for exercises provided prior visit.    Assessment   ***  Patient presents reporting minimal low back pain at this time but yesterday afternoon she was doing a lot of standing/walking an unsupported sitting and later that night it aggravated her back. She did he stretches at night but has a restless sleep. This morning she is feeling better. Continued to review HEP and given band and fridge magnet for home. Patient's Lumbar MedX maintained 57 ft lbs with 18 reps performed at an exertion rating of 3/10. All peripheral strength  exercises completed without adverse effects.    Patient is making good progress towards established goals.    Pt will continue to benefit from skilled outpatient physical therapy to address the deficits stated in the impairment chart, provide pt/family education and to maximize pt's level of independence in the home and community environment.     Anticipated Barriers for therapy: none  Pt's spiritual, cultural and educational needs considered and pt agreeable to plan of care and goals as stated below:     GOALS: Pt is in agreement with the following goals.     Short term goals:  6 weeks or 10 visits   - Pt will demonstrate increased lumbar ROM by at least 6 degrees from the initial ROM value with improvements noted in functional ROM and ability to perform ADLs. Appropriate and Ongoing  - Pt will demonstrate increased MedX average isometric strength value by 15% from initial test resulting in improved ability to perform bending, lifting, and carrying activities safely, confidently. Appropriate and Ongoing  - Pt will report a reduction in worst pain score by 1-2 points for improved tolerance for transfers sit to stand. Appropriate and Ongoing  - Pt able to perform HEP correctly with minimal cueing or supervision from therapist to encourage independent management of symptoms. Appropriate and Ongoing     Long term goals: 10 weeks or 20 visits   - Pt will demonstrate increased lumbar ROM by at least 12 degrees from initial ROM value, resulting in improved ability to perform functional forward bending while standing and sitting. Appropriate and Ongoing  - Pt will demonstrate increased MedX average isometric strength value by 25% from initial test resulting in improved ability to perform bending, lifting, and carrying activities safely and confidently. Appropriate and Ongoing  - Pt to demonstrate ability to independently control and reduce their pain through posture positioning and mechanical movements throughout a typical  day. Appropriate and Ongoing  - Pt will demonstrate reduced pain and improved functional outcomes as reported on the FOTO by reaching a limitation score of < or = 15% or less in order to demonstrate subjective improvement in pt's condition.   Appropriate and Ongoing  - Pt will demonstrate independence with the HEP at discharge. Appropriate and Ongoing  - Pt will be able to complete safe bending and lifting up to 10# without increased low back pain (patient goal) Appropriate and Ongoing    Plan     Continue with established Plan of Care towards established PT goals.     Therapist: Gabriela Nina, PT  Date: 7/30/2024

## 2024-07-30 NOTE — TELEPHONE ENCOUNTER
Called to schedule appt with Adia. Pt decided on 9/12 at 1pm bc she is seeing podiatry the week earlier for a sx consult, so may cancel appt with Adia depending on how that appt goes.     --------------------------------------------------------------------------------------  Alyson Desai PA-C P [facility] Staff  This patient was supposed to follow-up after she recently got a myelogram to discuss results, but I don't see anything scheduled. It was completed 7/10/2024.    Thanks!

## 2024-08-02 ENCOUNTER — APPOINTMENT (OUTPATIENT)
Dept: RADIOLOGY | Facility: CLINIC | Age: 74
End: 2024-08-02
Attending: INTERNAL MEDICINE
Payer: MEDICARE

## 2024-08-02 DIAGNOSIS — M81.8 OTHER OSTEOPOROSIS WITHOUT CURRENT PATHOLOGICAL FRACTURE: ICD-10-CM

## 2024-08-02 PROCEDURE — 77080 DXA BONE DENSITY AXIAL: CPT | Mod: TC,HCNC,PO

## 2024-08-02 PROCEDURE — 77080 DXA BONE DENSITY AXIAL: CPT | Mod: 26,,, | Performed by: INTERNAL MEDICINE

## 2024-08-05 ENCOUNTER — CLINICAL SUPPORT (OUTPATIENT)
Dept: REHABILITATION | Facility: OTHER | Age: 74
End: 2024-08-05
Payer: MEDICARE

## 2024-08-05 ENCOUNTER — CLINICAL SUPPORT (OUTPATIENT)
Dept: REHABILITATION | Facility: HOSPITAL | Age: 74
End: 2024-08-05
Payer: MEDICARE

## 2024-08-05 DIAGNOSIS — R26.89 BALANCE PROBLEM: ICD-10-CM

## 2024-08-05 DIAGNOSIS — M25.521 ELBOW PAIN, RIGHT: Primary | ICD-10-CM

## 2024-08-05 DIAGNOSIS — R29.898 DECREASED STRENGTH OF TRUNK AND BACK: ICD-10-CM

## 2024-08-05 DIAGNOSIS — R68.89 DECREASED FUNCTIONAL ACTIVITY TOLERANCE: Primary | ICD-10-CM

## 2024-08-05 PROCEDURE — 97140 MANUAL THERAPY 1/> REGIONS: CPT

## 2024-08-05 PROCEDURE — 97112 NEUROMUSCULAR REEDUCATION: CPT

## 2024-08-05 PROCEDURE — 97530 THERAPEUTIC ACTIVITIES: CPT

## 2024-08-05 PROCEDURE — 97018 PARAFFIN BATH THERAPY: CPT

## 2024-08-06 ENCOUNTER — OUTPATIENT CASE MANAGEMENT (OUTPATIENT)
Dept: ADMINISTRATIVE | Facility: OTHER | Age: 74
End: 2024-08-06
Payer: MEDICARE

## 2024-08-07 ENCOUNTER — OUTPATIENT CASE MANAGEMENT (OUTPATIENT)
Dept: ADMINISTRATIVE | Facility: OTHER | Age: 74
End: 2024-08-07
Payer: MEDICARE

## 2024-08-07 ENCOUNTER — CLINICAL SUPPORT (OUTPATIENT)
Dept: REHABILITATION | Facility: HOSPITAL | Age: 74
End: 2024-08-07
Payer: MEDICARE

## 2024-08-07 DIAGNOSIS — M25.521 ELBOW PAIN, RIGHT: Primary | ICD-10-CM

## 2024-08-07 PROCEDURE — 97530 THERAPEUTIC ACTIVITIES: CPT | Mod: KX

## 2024-08-07 PROCEDURE — 97140 MANUAL THERAPY 1/> REGIONS: CPT | Mod: KX

## 2024-08-07 PROCEDURE — 97018 PARAFFIN BATH THERAPY: CPT | Mod: KX

## 2024-08-13 ENCOUNTER — PATIENT MESSAGE (OUTPATIENT)
Dept: REHABILITATION | Facility: HOSPITAL | Age: 74
End: 2024-08-13

## 2024-08-14 ENCOUNTER — DOCUMENTATION ONLY (OUTPATIENT)
Dept: REHABILITATION | Facility: OTHER | Age: 74
End: 2024-08-14
Payer: MEDICARE

## 2024-08-14 NOTE — PROGRESS NOTES
Health  Wellness Visit Note    Name: Manisha Wynne  Clinic Number: [id number]  Physician: Sarmad Varghese PA-C  Diagnosis: No diagnosis found.  Past Medical History:   Diagnosis Date    Abnormal Pap smear     Atrial fibrillation     AV block, 1st degree 07/25/2012    C. difficile diarrhea     RESOLVED    Cataract     Depression 07/24/2012    Facet arthritis of lumbar region 03/31/2015    Falls     3 falls in the last 6 mos--noted 6/19/19    Hyperlipidemia     Hypertension 07/24/2012    Neuropathy     Other specified cardiac dysrhythmias(427.89)     Sleep apnea     Syncope 07/24/2012    Tremors of nervous system     hands bilaterally     Visit Number: 21  Precautions: standard, fall, R elbow fracture       1st PT visit:  4/24/2024  Year of care end date:  4/24/2025  Mindbody plan:1F  Patient level:NP    Time In: 9:02 AM  Time Out: 10:00 AM  Total Treatment Time: 58 minutes    Wellness Vision 2022  Handout on this week's wellness topic Small Steps provided  along with a discussion on what it means, the benefits, and suggestions for practice.  Reviewed last week's topic of NA patients first Wellness visit.    Subjective:   Patient reports 2/10 pain in her back today. Patient does stretching often at home. Patient is thinking of joining the [employer]. Patient uses ice often and some heat.     Objective:   Manisha completed therapeutic stretches (EIL, ROBERT) and the following MedX exercise machines: core lumbar, torso rotation l/r, leg extension, leg curl, upright row, chest press, biceps curl, triceps extension, leg press    See exercise log in patient folder for rate of exertion and repetitions completed.       Fitness Machine Education Key:  E=education on equipment initiated and further follow up and education needed  I=independent with  and exercise.  The patient:  Adjusts machines to his/her settings  Uses equipment levers, pins, weights safely  Maintains safe and correct posture  while exercising  Moves through exercise with correct pace and control  Gets on and off equipment safely      Lumbar/Cervical Ext.  Torso Rotation  Leg Press    Leg Extension  Seated Leg Curl  Chest Press    Seated Row  Hip ADD  Hip ABD    Triceps Extension  Bicep Curl  Other:      [x] Indicates exercise has been taught for home  Lumbar/Cervical Ext. [] Torso Rotation [] Leg Press []   Leg Extension [] Seated Leg Curl [] Chest Press []   Seated Row [] Hip ADD [] Hip ABD []   Triceps Extension [] Bicep Curl [] Other:        Assessment:   Patient tolerated Patient tolerated MedX Core Lumbar Strength and all other peripheral exercises without an increase in symptoms. Patient warmed up on for 5 minutes, stretched, and iced low back for 5 minutes after the workout.     Plan:  Continue with established plan of care towards wellness goals.     Health  : Bashir Fuentes  8/14/2024

## 2024-08-15 ENCOUNTER — CLINICAL SUPPORT (OUTPATIENT)
Dept: REHABILITATION | Facility: HOSPITAL | Age: 74
End: 2024-08-15
Payer: MEDICARE

## 2024-08-15 ENCOUNTER — TELEPHONE (OUTPATIENT)
Dept: UROGYNECOLOGY | Facility: CLINIC | Age: 74
End: 2024-08-15
Payer: MEDICARE

## 2024-08-15 DIAGNOSIS — M25.521 ELBOW PAIN, RIGHT: Primary | ICD-10-CM

## 2024-08-15 PROCEDURE — 97018 PARAFFIN BATH THERAPY: CPT

## 2024-08-15 PROCEDURE — 97140 MANUAL THERAPY 1/> REGIONS: CPT

## 2024-08-15 PROCEDURE — 97530 THERAPEUTIC ACTIVITIES: CPT

## 2024-08-15 NOTE — PROGRESS NOTES
[facility] OUTPATIENT THERAPY AND WELLNESS  Occupational Therapy Treatment Note    Date: 8/15/2024  Name: Manisha Wynne  Clinic Number: [id number]    Therapy Diagnosis: R olecranon Fx w/ ORIF 2/28/2024  1. Elbow pain, right          Medical Diagnosis: R Olecranon fracture with ORIF 2/28/24  ICD-10:   Z98.890 (ICD-10-CM) - Post-operative state   S52.021A (ICD-10-CM) - Closed fracture of olecranon process of right ulna, initial encounter      Physician: Sarmad Varghese PA-C; Dr. DESIREE Guevara   Physician Orders: Eval and Treat  R Elbow Olecranon ORIF  DOS: 2/28/24  12 Visits     Surgical Procedure and Date: 2/28/24,   Procedure(s) (LRB):  ORIF, ELBOW (Right)        Evaluation Date: 4/23/2024     Plan of Care Certification Period: 8/23/24     Date of Return to MD: 9/10/24     Visit # / Visits authorized: 20/ 40   Insurance Authorization Period Expiration: 12/31/24     FOTO #1: FSM 47% / GOAL 65%  FOTO #2: 5/22/24 FSM    52% / goal 65%   FOTO #3:      Precautions:  Standard and Weightbearing; Fall Risk      Time In: 10:10 am   Time Out: 10:40am  Total Appointment Time (timed & untimed codes): 30 minutes    Patient late this date; did follow x 30 min to assess OTC braces  SUBJECTIVE   Patient casted 12 weeks, cast removed 5/10/24     Pt reports: I'm doing maintenance plan with healthy back and he said he'll meet me at [facility] to show me the exercises to do, he also showed me some elbow exercises.   She was compliant with home exercise program given last session.   Response to previous treatment:supination improved   Functional change: none     Pain: 4/10  Location: right elbow sore stiff      OBJECTIVE     Objective Measures updated at progress report unless specified.    Edema. Measured in centimeters.    4/23/2024 5/22/24          Proximal Wrist Crease 15.8 cm =   Proximal Palmar Crease       MCPs            UE  ROM. Measured in degrees.    4/23/2024 5/15/24 6/17/2024 8/5/24            Wrist  ext/flex  30 / 45  =/= 43/65 38/65   Sup/pro  70 / 70 75/90 80/90 90/90            Elbow ext / flex  52/122 25/ 130 20/145 20/148                  Strength (Dyanmometer) and Pinch Strength (Pinch Gauge)  Measured in pounds and psi. Average of three trials.    6/17/2024 6/17/2024 8/5/24     Right Left Right    Rung II 34.2 41.4 38 (+4)   Key Pinch 8 8 =   3pt Pinch 8.5 8 =   2pt Pinch 6 5 =     Treatment     Manisha received the treatments listed below:     Supervised Modalities after being cleared for contradictions: 10 min  paraffin to to R elbow and hand  x 10 min to increase blood flow, circulation and tissue elasticity prior to therex.     Manual therapy techniques: for 10 min (including Joint mobilizations, Myofacial release, Manual Lymphatic Drainage, Soft tissue Mobilization, and Friction Massage)  were applied to the R elbow  as follows:   - scar massage  to R elbow to decrease adhesions and improve tensile glide   - DTM to biceps, triceps and anti cubital fossa to decrease muscle tightness and guarding to facilitate elbow extension.      Therapeutic Activities to improve functional performance for 25 minutes, including:  - Assessed 2 OTC elbow braces purchased by patient to assess fit, comfort.   - 2# wrist flex, ext ( in fishtail position), RD, UD x 20 reps each   - 2# elbow flex, extension palm up x 20 reps  - 1#  hammer for sup/pro x 10 reps   - Biceps stretch (in sitting): 2 lbs.  x 20 reps   - - large red powerweb for  and pull with left UE to simulate gear shift in car x 10 reps     Patient Education and Home Exercises      Education provided:   - Cont HEP   - Progress towards goals     Written Home Exercises Provided: Patient instructed to cont prior HEP.  Exercises were reviewed and Manisha was able to demonstrate them prior to the end of the session.  Manisha demonstrated good  understanding of the HEP provided. See EMR under Patient Instructions for exercises provided during therapy  sessions.       Assessment      Elbow strength remains limited. Limited elbow extension improving, but limited at end range.  Patient notes improvement using gear shifts in her car with is consistent with improved endurance during resistive activity. Therapy to continue to focus on end range of motion and strengthening for improved performance of functional activity.     Manisha is progressing well towards her goals and there are no updates to goals at this time. Pt prognosis is Good. Will progress with  and pinch strengthening.     Pt will continue to benefit from skilled outpatient occupational therapy to address the deficits listed in the problem list on initial evaluation provide pt/family education and to maximize pt's level of independence in the home and community environment.     Pt's spiritual, cultural and educational needs considered and pt agreeable to plan of care and goals.    Anticipated barriers to occupational therapy: none     The following goals were discussed with the patient and patient is in agreement with them as to be addressed in the treatment plan.      Short Term Goals: (6 weeks):  1)  Patient to be IND with HEP and modalities for pain management- met  2)  Increase ROM 10-20 degrees to increase functional hand use for ADLs/work/leisure activities- MET  3)   Decrease edema .1-.5mm to increase joint mobility /flexibility for functional hand use. - met  4)  Assess   and pinch and set goals accordingly MET     Long Term Goals :To be met by discharge (12 weeks)   1)  Increase  strength 2-8 lbs. For driving, cooking   2) Increase pinch strength 1-4 psi's for opening bottles and FM coordination   3)   Increase ROM 21-40 degrees to increase functional hand use for ADLs/work/leisure activities  4) Patient to score at _60_% OR MORE (FSM)  on FOTO to demonstrate improved perception of functional R hand  Use.     Plan   Certification Period/Plan of care expiration: 4/23/2024 to  8/23/24.    Desi Medellin, OT , CHT

## 2024-08-15 NOTE — TELEPHONE ENCOUNTER
Scrubbing Davey's schedule, called to see why they are scheduled. Pt last seen 2 years ago by Jessica, supposed to follow up 6 months later and never followed up.   Saw Davey in 2021, and Davey wanted her to see Jessica.

## 2024-08-16 ENCOUNTER — TELEPHONE (OUTPATIENT)
Dept: UROGYNECOLOGY | Facility: CLINIC | Age: 74
End: 2024-08-16
Payer: MEDICARE

## 2024-08-16 NOTE — TELEPHONE ENCOUNTER
----- Message from Lisa Finnegan MA sent at 8/16/2024 10:18 AM CDT -----  Regarding: Return Call  Message Type: Return Call           Who Called:JAYRO RUBY [[phone]]              Who Left Message for Patient: Aline Sun MA                Does the patient know what this is regarding?  yes              Best Call Back Number: [phone]               Additional Information: Patient is returning a call.  Please assist.

## 2024-08-19 ENCOUNTER — CLINICAL SUPPORT (OUTPATIENT)
Dept: REHABILITATION | Facility: HOSPITAL | Age: 74
End: 2024-08-19
Payer: MEDICARE

## 2024-08-19 DIAGNOSIS — M25.521 ELBOW PAIN, RIGHT: Primary | ICD-10-CM

## 2024-08-19 PROCEDURE — 97530 THERAPEUTIC ACTIVITIES: CPT

## 2024-08-19 PROCEDURE — 97018 PARAFFIN BATH THERAPY: CPT

## 2024-08-19 PROCEDURE — 97140 MANUAL THERAPY 1/> REGIONS: CPT

## 2024-08-21 ENCOUNTER — DOCUMENTATION ONLY (OUTPATIENT)
Dept: REHABILITATION | Facility: OTHER | Age: 74
End: 2024-08-21
Payer: MEDICARE

## 2024-08-21 ENCOUNTER — CLINICAL SUPPORT (OUTPATIENT)
Dept: REHABILITATION | Facility: HOSPITAL | Age: 74
End: 2024-08-21
Payer: MEDICARE

## 2024-08-21 DIAGNOSIS — M25.521 ELBOW PAIN, RIGHT: Primary | ICD-10-CM

## 2024-08-21 PROCEDURE — 97018 PARAFFIN BATH THERAPY: CPT

## 2024-08-21 PROCEDURE — 97140 MANUAL THERAPY 1/> REGIONS: CPT

## 2024-08-21 PROCEDURE — 97530 THERAPEUTIC ACTIVITIES: CPT

## 2024-08-21 NOTE — PROGRESS NOTES
Health  Wellness Visit Note    Name: Manisha Wynne  Clinic Number: [id number]  Physician: Sarmad Varghese PA-C  Diagnosis: No diagnosis found.  Past Medical History:   Diagnosis Date    Abnormal Pap smear     Atrial fibrillation     AV block, 1st degree 07/25/2012    C. difficile diarrhea     RESOLVED    Cataract     Depression 07/24/2012    Facet arthritis of lumbar region 03/31/2015    Falls     3 falls in the last 6 mos--noted 6/19/19    Hyperlipidemia     Hypertension 07/24/2012    Neuropathy     Other specified cardiac dysrhythmias(427.89)     Sleep apnea     Syncope 07/24/2012    Tremors of nervous system     hands bilaterally     Visit Number: 22  Precautions: standard, fall, R elbow fracture       1st PT visit:  4/24/2024  Year of care end date:  4/24/2025  Mindbody plan: Month to Month  Patient level:C    Time In: 9:12 AM  Time Out: 10:09 AM  Total Treatment Time: 57 minutes    Wellness Vision 2022  Handout on this week's wellness topic Stages of Change provided  along with a discussion on what it means, the benefits, and suggestions for practice.  Reviewed last week's topic of Small Steps.    Subjective:   Patient reports minimal pain in her back today. Patient does stretching often at home. Patient is thinking of joining the [employer]. Patient did not use ice this past week.     Objective:   Manisha completed therapeutic stretches (EIL, ROBERT) and the following MedX exercise machines: core lumbar, torso rotation l/r, leg extension, leg curl, upright row, chest press, biceps curl, triceps extension, leg press    See exercise log in patient folder for rate of exertion and repetitions completed.       Fitness Machine Education Key:  E=education on equipment initiated and further follow up and education needed  I=independent with  and exercise.  The patient:  Adjusts machines to his/her settings  Uses equipment levers, pins, weights safely  Maintains safe and correct posture  while exercising  Moves through exercise with correct pace and control  Gets on and off equipment safely      Lumbar/Cervical Ext.  Torso Rotation  Leg Press    Leg Extension  Seated Leg Curl  Chest Press    Seated Row  Hip ADD  Hip ABD    Triceps Extension  Bicep Curl  Other:      [x] Indicates exercise has been taught for home  Lumbar/Cervical Ext. [] Torso Rotation [] Leg Press []   Leg Extension [] Seated Leg Curl [] Chest Press []   Seated Row [] Hip ADD [] Hip ABD []   Triceps Extension [] Bicep Curl [] Other:        Assessment:   Patient tolerated Patient tolerated MedX Core Lumbar Strength and all other peripheral exercises without an increase in symptoms. Patient warmed up on for 5 minutes, stretched, and iced low back for 5 minutes after the workout.     Plan:  Continue with established plan of care towards wellness goals.     Health  : Bashir Fuentes  8/21/2024

## 2024-08-21 NOTE — PROGRESS NOTES
[facility]  Occupational Therapy Treatment Note    Date: 8/21/2024  Name: Manisha Wynne  Clinic Number: [id number]    Therapy Diagnosis: R olecranon Fx w/ ORIF 2/28/2024  1. Elbow pain, right          Medical Diagnosis: R Olecranon fracture with ORIF 2/28/24  ICD-10:   Z98.890 (ICD-10-CM) - Post-operative state   S52.021A (ICD-10-CM) - Closed fracture of olecranon process of right ulna, initial encounter      Physician: Sarmad Varghese PA-C; Dr. DESIREE Guevara   Physician Orders: Eval and Treat  R Elbow Olecranon ORIF  DOS: 2/28/24  12 Visits     Surgical Procedure and Date: 2/28/24,   Procedure(s) (LRB):  ORIF, ELBOW (Right)        Evaluation Date: 4/23/2024     Plan of Care Certification Period: 8/23/24     Date of Return to MD: 9/10/24     Visit # / Visits authorized: 23/ 40   Insurance Authorization Period Expiration: 12/31/24     FOTO #1: FSM 47% / GOAL 65%  FOTO #2: 5/22/24 FSM    52% / goal 65%   FOTO #3:      Precautions:  Standard and Weightbearing; Fall Risk      Time In: 10:45 am   Time Out: 1130am  Total Appointment Time (timed & untimed codes): 45 minutes    SUBJECTIVE   Patient casted 12 weeks, cast removed 5/10/24     Pt reports: I'm meeting health  on Friday at gym to go over the exercises I can do. I'm getting new car today.     She was compliant with home exercise program given last session.   Response to previous treatment:supination improved   Functional change: none     Pain: 4/10  Location: right elbow sore stiff      OBJECTIVE   UPDATE POC NEXT SESSION    Objective Measures updated at progress report unless specified.    Edema. Measured in centimeters.    4/23/2024 5/22/24          Proximal Wrist Crease 15.8 cm =   Proximal Palmar Crease       MCPs            UE  ROM. Measured in degrees.    4/23/2024 5/15/24 6/17/2024 8/5/24            Wrist ext/flex  30 / 45  =/= 43/65 38/65 (+20)   Sup/pro  70 / 70 75/90 80/90 90(+20)/90(+20)            Elbow ext /  "flex  52/122 25/ 130 20/145 20(+32)/148 (+26)                   Strength (Dyanmometer) and Pinch Strength (Pinch Gauge)  Measured in pounds and psi. Average of three trials.    6/17/2024 6/17/2024 8/5/24     Right Left Right    Rung II 34.2 41.4 38 (+4)   Key Pinch 8 8 =   3pt Pinch 8.5 8 =   2pt Pinch 6 5 =     Treatment     Manisha received the treatments listed below:     Supervised Modalities after being cleared for contradictions: 10 min  paraffin to to R elbow and hand  x 10 min to increase blood flow, circulation and tissue elasticity prior to therex.     Manual therapy techniques: for 10 min (including Joint mobilizations, Myofacial release, Manual Lymphatic Drainage, Soft tissue Mobilization, and Friction Massage)  were applied to the R elbow  as follows:   - scar massage  to R elbow to decrease adhesions and improve tensile glide   - DTM to biceps, triceps and anti cubital fossa to decrease muscle tightness and guarding to facilitate elbow extension.      Therapeutic Activities to improve functional performance for 25 minutes, including:  - 2# wrist flex, ext ( in fishtail position), RD, UD x 20 reps each   - 2# elbow flex, extension palm up x 20 reps  - 1#  hammer for sup/pro x 10 reps   - Biceps stretch (in sitting): 2 lbs.  x 20"  - - Red Putty: log roll with tripod pinch; gripping; extension blooms x 10 each (NT)  -   Patient Education and Home Exercises      Education provided:   - Cont HEP   - Progress towards goals     Written Home Exercises Provided: Patient instructed to cont prior HEP.  Exercises were reviewed and Manisha was able to demonstrate them prior to the end of the session.  Manisha demonstrated good  understanding of the HEP provided. See EMR under Patient Instructions for exercises provided during therapy sessions.       Assessment     Increased muscle soreness, pain, stiffness following recent fall. Elbow strength remains limited. Elbow extension improving.  Progressing well with " strengthening.   remains slightly limited.  Therapy to continue to focus on end range of motion and strengthening for improved performance of functional activity.     Manisha is progressing well towards her goals and there are no updates to goals at this time. Pt prognosis is Good. Will progress with  and pinch strengthening.     Pt will continue to benefit from skilled outpatient occupational therapy to address the deficits listed in the problem list on initial evaluation provide pt/family education and to maximize pt's level of independence in the home and community environment.     Pt's spiritual, cultural and educational needs considered and pt agreeable to plan of care and goals.    Anticipated barriers to occupational therapy: none     The following goals were discussed with the patient and patient is in agreement with them as to be addressed in the treatment plan.      Short Term Goals: (6 weeks):  1)  Patient to be IND with HEP and modalities for pain management- met  2)  Increase ROM 10-20 degrees to increase functional hand use for ADLs/work/leisure activities- MET  3)   Decrease edema .1-.5mm to increase joint mobility /flexibility for functional hand use. - met  4)  Assess   and pinch and set goals accordingly MET     Long Term Goals :To be met by discharge (12 weeks)   1)  Increase  strength 2-8 lbs. For driving, cooking - met, continuing   2) Increase pinch strength 1-4 psi's for opening bottles and FM coordination   3)   Increase ROM 21-40 degrees to increase functional hand use for ADLs/work/leisure activities- met  4) Patient to score at _60_% OR MORE (FSM)  on FOTO to demonstrate improved perception of functional R hand  Use.     Plan   Certification Period/Plan of care expiration: 4/23/2024 to 8/23/24.    Desi Medellin, OT , CHT

## 2024-08-22 ENCOUNTER — OFFICE VISIT (OUTPATIENT)
Dept: UROGYNECOLOGY | Facility: CLINIC | Age: 74
End: 2024-08-22
Payer: MEDICARE

## 2024-08-22 VITALS
BODY MASS INDEX: 22.38 KG/M2 | SYSTOLIC BLOOD PRESSURE: 123 MMHG | HEART RATE: 69 BPM | DIASTOLIC BLOOD PRESSURE: 66 MMHG | WEIGHT: 147.69 LBS | HEIGHT: 68 IN

## 2024-08-22 DIAGNOSIS — N39.41 URGE INCONTINENCE: Primary | ICD-10-CM

## 2024-08-22 DIAGNOSIS — K59.00 CONSTIPATION, UNSPECIFIED CONSTIPATION TYPE: ICD-10-CM

## 2024-08-22 DIAGNOSIS — N95.2 VAGINAL ATROPHY: ICD-10-CM

## 2024-08-22 PROCEDURE — 1160F RVW MEDS BY RX/DR IN RCRD: CPT | Mod: CPTII,S$GLB,, | Performed by: NURSE PRACTITIONER

## 2024-08-22 PROCEDURE — 1101F PT FALLS ASSESS-DOCD LE1/YR: CPT | Mod: CPTII,S$GLB,, | Performed by: NURSE PRACTITIONER

## 2024-08-22 PROCEDURE — 3288F FALL RISK ASSESSMENT DOCD: CPT | Mod: CPTII,S$GLB,, | Performed by: NURSE PRACTITIONER

## 2024-08-22 PROCEDURE — 3078F DIAST BP <80 MM HG: CPT | Mod: CPTII,S$GLB,, | Performed by: NURSE PRACTITIONER

## 2024-08-22 PROCEDURE — 1157F ADVNC CARE PLAN IN RCRD: CPT | Mod: CPTII,S$GLB,, | Performed by: NURSE PRACTITIONER

## 2024-08-22 PROCEDURE — 1159F MED LIST DOCD IN RCRD: CPT | Mod: CPTII,S$GLB,, | Performed by: NURSE PRACTITIONER

## 2024-08-22 PROCEDURE — 3074F SYST BP LT 130 MM HG: CPT | Mod: CPTII,S$GLB,, | Performed by: NURSE PRACTITIONER

## 2024-08-22 PROCEDURE — 99999 PR PBB SHADOW E&M-EST. PATIENT-LVL IV: CPT | Mod: PBBFAC,,, | Performed by: NURSE PRACTITIONER

## 2024-08-22 PROCEDURE — 1126F AMNT PAIN NOTED NONE PRSNT: CPT | Mod: CPTII,S$GLB,, | Performed by: NURSE PRACTITIONER

## 2024-08-22 PROCEDURE — 99213 OFFICE O/P EST LOW 20 MIN: CPT | Mod: S$GLB,,, | Performed by: NURSE PRACTITIONER

## 2024-08-22 RX ORDER — VIBEGRON 75 MG/1
75 TABLET, FILM COATED ORAL DAILY
Qty: 30 TABLET | Refills: 11 | Status: SHIPPED | OUTPATIENT
Start: 2024-08-22

## 2024-08-22 RX ORDER — ESTRADIOL 0.1 MG/G
CREAM VAGINAL
Qty: 42.5 G | Refills: 3 | Status: SHIPPED | OUTPATIENT
Start: 2024-08-22

## 2024-08-22 NOTE — PROGRESS NOTES
Urogyn follow up  08/22/2024    [facility] - UROGYNECOLOGY  [address] [address]    Manisha Wynne  [id number]  1950      Manisha Wynne is a 73 y.o.  here for a urogyn follow up mixed urinary incontinence.    Last HPI from 05/27/2020 ([place])     1)  UI:  (--) BRIAN (occasional) <  (+) UUI  X 2years. Usually worst in the morning. Goes to bed around 11, gets up around 1 to urinate, then has to urinate first thing in the am or she will leak when she gets out of bed. (+) pads 0- 1/day, only wears one when she travels, usually minimum wetness and 0/night usually minimum wetness.  Daytime frequency: Q 2 hours.  Nocturia: Yes: 2/night.   (--) dysuria,  (--) hematuria,  (--) frequent UTIs.  (+) incomplete bladder emptying. Will sit on toilet and wait for another urge.      2)  POP:  Absent. above introitus.  Symptoms:(--).  (--) vaginal bleeding. (--) vaginal discharge. (--) sexually active.  (--) dyspareunia.  (--)  Vaginal dryness.  (--) vaginal estrogen use.    no pop  Pvr 20 ml    3)  BM:  (--) constipation/straining.  (--) chronic diarrhea. (--) hematochezia.  (--) fecal incontinence.  (--) fecal smearing/urgency.  (--) incomplete evacuation.     11/17/2021  1)  UI:  (+) BRIAN  <  (+) UUI.   (--) pads:  Daytime frequency: Q 3 hours.  Nocturia: Yes: 1/night. Goes to bed at MD, wakes around 330-430 and 630 to urinate.   (--) dysuria,  (--) hematuria,  (--) frequent UTIs.  (--) complete bladder emptying. Urgency in worse when she has diarrhea from C.  Diff.  --started pelvic floor PT-- did not complete due to covid. Didn't do enough to tell if helped.   --has not looked at diet for triggers     2)  POP:  Absent. above introitus.  Symptoms:(--).  (--) vaginal bleeding. (--) vaginal discharge. (--) sexually active.  (--) dyspareunia.  (--)  Vaginal dryness.  (--) vaginal estrogen use.  Concern for enlarged bartholin's gland on L side  Did not use vaginal estrogen cream due to  cost.      3)  BM:  (--) constipation/straining.  (--) chronic diarrhea. (--) hematochezia.  (--) fecal incontinence.  (--) fecal smearing/urgency.  (--) incomplete evacuation.       4) C5-C6 cervical stenosis and myelopathy.   --laminectomy was planned, but cancelled due to C. Diff     5) C. Diff:  --was on antibiotics for cat scratch  --diagnosed 06/2021 prior to spinal surgery  --currently taking vancomycin; this is her 3th round              --followed by ID  --currently having normal consistency  --was having watery stool with c diff    03/21/2022  1)  Mixed urinary incontinence, urge > stress:    --bladder symptoms are worse when BM are not controlled  --did not start [facility] pelvic floor PT : (p) [phone]. **Use vaginal suppositories before and/or after PT if having pain  --rare UUI--not using pads unless she has diarrhea     2) Vaginal atrophy (dryness):   --using vaginal estrogen cream     3) Nocturia (nighttime urination):  --nocturia-- denies  --only getting up at 5/6 in the AM  --does limit fluids prior to bedtime       4) H/o constipation/straining, now with c diff:  --in clinical trial   --follow up with GI and infectious disease  --eating yogurt with probiotics and kefir.    Changes since last visit:  1)  Mixed urinary incontinence, urge > stress:    -- +BRIAN < +UUI--worse over the past years  --voiding every 3-4 hours during the day and 1/ night--4:30/5:30--does limit fluids prior to bedtime  --using 3-4 pads with moderate to severe wetness     2) Vaginal atrophy (dryness):   --has not been using vaginal estrogen cream      3) H/o constipation/straining, hx c diff:  --goes every other day  --rare straining took stool softener-- made it too loose             Past Medical History:   Diagnosis Date    Abnormal Pap smear     Atrial fibrillation     AV block, 1st degree 07/25/2012    C. difficile diarrhea     RESOLVED    Cataract     Depression 07/24/2012    Facet arthritis of lumbar  region 03/31/2015    Falls     3 falls in the last 6 mos--noted 6/19/19    Hyperlipidemia     Hypertension 07/24/2012    Neuropathy     Other specified cardiac dysrhythmias(427.89)     Sleep apnea     Syncope 07/24/2012    Tremors of nervous system     hands bilaterally       Past Surgical History:   Procedure Laterality Date    APPLICATION OF CARTILAGE GRAFT Bilateral 12/19/2019    Procedure: APPLICATION, CARTILAGE GRAFT AURICULAR JEZ;  Surgeon: Martinez Flores III, MD;  Location: [facility];  Service: ENT;  Laterality: Bilateral;    CARDIAC PACEMAKER PLACEMENT  09/07/2012    [id number]    CATARACT EXTRACTION W/  INTRAOCULAR LENS IMPLANT Right 5/16/2022    Procedure: EXTRACTION, CATARACT, WITH IOL INSERTION;  Surgeon: Ines Aguilera MD;  Location: [facility] OR;  Service: Ophthalmology;  Laterality: Right;  Catalys     CATARACT EXTRACTION W/  INTRAOCULAR LENS IMPLANT Left 6/20/2022    Procedure: EXTRACTION, CATARACT, WITH IOL INSERTION;  Surgeon: Ines Aguilera MD;  Location: [facility] OR;  Service: Ophthalmology;  Laterality: Left;  Catalys     DILATION AND CURETTAGE OF UTERUS      Hx of precancerous cells?    ENDOSCOPIC ULTRASOUND OF UPPER GASTROINTESTINAL TRACT N/A 7/5/2019    Procedure: ULTRASOUND, UPPER GI TRACT, ENDOSCOPIC;  Surgeon: Kev Calderon MD;  Location: [facility] ENDO (2ND FLR);  Service: Endoscopy;  Laterality: N/A;  Pacemaker-Adam   appt confirmed-rb    MYELOGRAPHY N/A 5/4/2021    Procedure: Myelogram  CERVICAL, THORACIC AND LUMBAR;  Surgeon: [facility] Diagnostic Provider;  Location: [facility] OR 2ND FLR;  Service: Radiology;  Laterality: N/A;    NASAL SEPTOPLASTY N/A 12/19/2019    Procedure: SEPTOPLASTY, NOSE;  Surgeon: Martinez Flores III, MD;  Location: [facility];  Service: ENT;  Laterality: N/A;  FOLLOW DR SALVATORE KIMBROUGH PROTOCOL    OPEN REDUCTION AND INTERNAL FIXATION (ORIF) OF FRACTURE OF DISTAL RADIUS Left 1/24/2020    Procedure: ORIF, FRACTURE, RADIUS, DISTAL-left;  Surgeon: Ellen Moe MD;   Location: [facility] OR;  Service: Orthopedics;  Laterality: Left;    OPEN REDUCTION AND INTERNAL FIXATION (ORIF) OF INJURY OF ELBOW Right 2024    Procedure: ORIF, ELBOW;  Surgeon: Ellen Moe MD;  Location: [facility] OR;  Service: Orthopedics;  Laterality: Right;    POSTERIOR FUSION OF CERVICAL SPINE WITH LAMINECTOMY N/A 2022    Procedure: LAMINECTOMY, SPINE, CERVICAL, WITH POSTERIOR FUSION C3-C6;  Surgeon: Nicolette Cheek MD;  Location: [address] FLR;  Service: Neurosurgery;  Laterality: N/A;  TORONTO III, ASA III, BLOOD TYPE AND SCREEN, NEUROMONITORING EMG SEP MEP, BRACE/HALO [place], BED REGULAR BED, HEADREST [place], POSITION PRONE, SPECIAL EQUIPMENT NIKI MIDDLE[place], RADIOLOGY C-ARM, EXT. BONE STIMULATOR BIOMET    REPLACEMENT OF PACEMAKER GENERATOR Left 10/7/2022    Procedure: REPLACEMENT, PACEMAKER GENERATOR;  Surgeon: John Sheets MD;  Location: [facility] EP LAB;  Service: Cardiology;  Laterality: Left;  YAS, SSS, AVB, Dual PPM Gen Change,SJM, MAC ,AR, 3 prep,** Adam dcPPM in situ**    SURGICAL REMOVAL OF NASAL TURBINATE Bilateral 2019    Procedure: EXCISION, NASAL TURBINATE;  Surgeon: Martinez Flores III, MD;  Location: [facility];  Service: ENT;  Laterality: Bilateral;    TONSILLECTOMY      WISDOM TOOTH EXTRACTION         Family History   Adopted: Yes   Problem Relation Name Age of Onset    Amblyopia Neg Hx      Blindness Neg Hx      Cataracts Neg Hx      Glaucoma Neg Hx      Macular degeneration Neg Hx      Retinal detachment Neg Hx         Social History     Socioeconomic History    Marital status:    Occupational History    Occupation: /Rabbi     Employer: [employer]   Tobacco Use    Smoking status: Former     Current packs/day: 0.00     Average packs/day: 0.3 packs/day for 15.0 years (3.8 ttl pk-yrs)     Types: Cigarettes     Start date: 1967     Quit date: 1982     Years since quittin.1    Smokeless tobacco: Former   Substance and Sexual  Activity    Alcohol use: Yes     Comment: no daily or heavy use, ~4 times per month    Drug use: No    Sexual activity: Not Currently     Partners: Male     Social Determinants of Health     Financial Resource Strain: Low Risk  (5/17/2024)    Overall Financial Resource Strain (CARDIA)     Difficulty of Paying Living Expenses: Not hard at all   Food Insecurity: No Food Insecurity (5/17/2024)    Hunger Vital Sign     Worried About Running Out of Food in the Last Year: Never true     Ran Out of Food in the Last Year: Never true   Transportation Needs: No Transportation Needs (5/17/2024)    TRANSPORTATION NEEDS     Transportation : No   Physical Activity: Insufficiently Active (5/17/2024)    Exercise Vital Sign     Days of Exercise per Week: 2 days     Minutes of Exercise per Session: 60 min   Stress: No Stress Concern Present (5/17/2024)    [place] [employer] - Occupational Stress Questionnaire     Feeling of Stress : Not at all   Housing Stability: Low Risk  (5/17/2024)    Housing Stability Vital Sign     Unable to Pay for Housing in the Last Year: No     Homeless in the Last Year: No       Current Outpatient Medications   Medication Sig Dispense Refill    acetaminophen (TYLENOL) 500 MG tablet Take 2 tablets (1,000 mg total) by mouth 2 (two) times daily as needed for Pain. Begin post op day 3. 50 tablet 0    buPROPion (WELLBUTRIN XL) 300 MG 24 hr tablet Take 1 tablet by mouth once daily.      lisdexamfetamine (VYVANSE) 70 MG capsule Take 70 mg by mouth every morning.      losartan (COZAAR) 25 MG tablet TAKE 1 TABLET ONE TIME DAILY 90 tablet 3    melatonin (MELATIN) 5 mg Take 1 tablet by mouth every evening. (For insomnia)      methocarbamoL (ROBAXIN) 500 MG Tab One - two daily as needed for back pain 40 tablet 0    methyl salicylate-menthol 15-10% 15-10 % Crea Apply topically every evening. 113 g 0    pravastatin (PRAVACHOL) 40 MG tablet Take 1 tablet (40 mg total) by mouth once daily. 90 tablet  "1    PROLIA 60 mg/mL Syrg Inject 60 mg into the skin every 6 (six) months.      propranoloL (INDERAL) 20 MG tablet TAKE 1 TABLET TWICE DAILY 180 tablet 3    aspirin (ECOTRIN) 81 MG EC tablet Take 1 tablet (81 mg total) by mouth 2 (two) times a day. 60 tablet 0    estradioL (ESTRACE) 0.01 % (0.1 mg/gram) vaginal cream Use one gram nightly x 2 weeks then twice weekly 42.5 g 3    vibegron (GEMTESA) 75 mg Tab Take 1 tablet (75 mg total) by mouth once daily. 30 tablet 11     No current facility-administered medications for this visit.       Review of patient's allergies indicates:  No Known Allergies    Well woman:  Last pap: 2018 -- normal  Last mammogram:05/2024 normal  Colonoscopy: 08/2015--normal-- due 2025--GI wants to do it sooner due to c. diff  DEXA:08/2024 osteoporosis-- on prolia        ROS:  As per HPI.      Exam  /66 (BP Location: Right arm, Patient Position: Sitting, BP Method: Medium (Automatic))   Pulse 69   Ht 5' 8" (1.727 m)   Wt 67 kg (147 lb 11.3 oz)   LMP 10/01/2003   BMI 22.46 kg/m²   General: alert and oriented, no acute distress  Respiratory: normal respiratory effort  Abd: soft, non-tender, non-distended    PELVIC EXAM:   VULVA: normal appearing vulva with no masses, tenderness or lesions,   VAGINA: normal appearing vagina with normal color and discharge, no lesions, atrophic,  CERVIX: normal appearing cervix without discharge or lesions,   UTERUS: uterus is normal size, shape, consistency and nontender,   ADNEXA: no masses,   RECTAL: deferred      Impression  1. Urge incontinence  vibegron (GEMTESA) 75 mg Tab    estradioL (ESTRACE) 0.01 % (0.1 mg/gram) vaginal cream      2. Vaginal atrophy  estradioL (ESTRACE) 0.01 % (0.1 mg/gram) vaginal cream      3. Constipation, unspecified constipation type          We reviewed the above issues and discussed options for short-term versus long-term management of her problems.   Plan:   1)  Mixed urinary incontinence, urge > stress:    -- Empty " bladder every 3 hours.  Empty well: wait a minute, lean forward on toilet.  -- Drink more water and avoid bladder irritants  -- Avoid dietary irritants (see sheet).  Keep diary x 3-5 days to determine your irritants.  --KEGELS: do 10 in AM and 10 in PM, holding each x 10 seconds.  When you feel urge to go, STOP, KEGEL, and when urge has passed, then go to bathroom.   --URGE: trial gemtesa 75 mg--- let me know if it is not affordable  --STRESS:  Pessary vs. Sling.      2) Vaginal atrophy (dryness):   --start vaginal estrogen cream  --use dime size amount in vagina-- insert to your second knuckle and rub around just inside vaginal opening nightly x 2 weeks then twice weekly       3) Nocturia (nighttime urination):  -- Stop fluids 2 hours before bed.   -- no water by bed  -- If have leg swelling:  Elevate feet above chest x 1 hour before bed to get excess fluid off.   -- Can also use support hose (knee highs).       4) H/o constipation/straining, now with c diff:  --continue kefir  --add flax seeds to your diet     5)  RTC 2 months for follow up    I spent a total of 20 minutes on the day of the visit.  This includes face to face time and non-face to face time preparing to see the patient (eg, review of tests), obtaining and/or reviewing separately obtained history, documenting clinical information in the electronic or other health record, independently interpreting results and communicating results to the patient/family/caregiver, or care coordinator.    Jessica Messer, ANNELIESE-BC [facility]  Division of Female Pelvic Medicine and Reconstructive Surgery  Department of Obstetrics & Gynecology

## 2024-08-22 NOTE — PATIENT INSTRUCTIONS
1)  Mixed urinary incontinence, urge > stress:    -- Empty bladder every 3 hours.  Empty well: wait a minute, lean forward on toilet.  -- Drink more water and avoid bladder irritants  -- Avoid dietary irritants (see sheet).  Keep diary x 3-5 days to determine your irritants.  --KEGELS: do 10 in AM and 10 in PM, holding each x 10 seconds.  When you feel urge to go, STOP, KEGEL, and when urge has passed, then go to bathroom.   --URGE: trial gemtesa 75 mg--- let me know if it is not affordable  --STRESS:  Pessary vs. Sling.      2) Vaginal atrophy (dryness):   --start vaginal estrogen cream  --use dime size amount in vagina-- insert to your second knuckle and rub around just inside vaginal opening nightly x 2 weeks then twice weekly       3) Nocturia (nighttime urination):  -- Stop fluids 2 hours before bed.   -- no water by bed  -- If have leg swelling:  Elevate feet above chest x 1 hour before bed to get excess fluid off.   -- Can also use support hose (knee highs).       4) H/o constipation/straining, now with c diff:  --continue kefir  --add flax seeds to your diet     5)  RTC 2 months for follow up      Bladder Irritants  Certain foods and drinks have been associated with worsening symptoms of urinary frequency, urgency, urge incontinence, or  bladder pain. If you suffer from any of these conditions, you may wish to try eliminating one or more of these foods from your  diet and see if your symptoms improve. If bladder symptoms are related to dietary factors, strict adherence to a diet that  eliminates the food should bring marked relief in 10 days. Once you are feeling better, you can begin to add foods back into  your diet, one at a time. If symptoms return, you will be able to identify the irritant. As you add foods back to your diet it is  very important that you drink significant amounts of water.  Low-acid fruit substitutions include apricots, papaya, pears and watermelon. Coffee drinkers can drink Kava or  other lowacid  instant drinks. Tea drinkers can substitute non-citrus herbal and sun brewed teas. Calcium carbonate co-buffered with  calcium ascorbate can be substituted for Vitamin C. Prelief is a dietary supplement that works as an acid blocker for the  bladder.  Where to get more information:   Overcoming Bladder Disorders by Kaylie Dorsey and Vadim Espinosa, 1990   You Dont Have to Live with Cystitis! By Belén Obando, 1988  List of Common Bladder Irritants*  Alcoholic beverages  Apples and apple juice  Cantaloupe  Carbonated beverages  Chili and spicy foods  Chocolate  Citrus fruit  Coffee (including decaffeinated)  Cranberries and cranberry juice  Grapes  Guava  Milk Products: milk, cheese, cottage cheese, yogurt, ice cream  Peaches  Pineapple  Plums  Strawberries  Sugar especially artificial sweeteners, saccharin, aspartame, corn sweeteners, honey, fructose, sucrose, lactose  Tea  Tomatoes and tomato juice  Vitamin B complex  Vinegar  *Most people are not sensitive to ALL of these products; your goal is to find the foods that make YOUR  symptoms worse

## 2024-08-28 ENCOUNTER — PATIENT MESSAGE (OUTPATIENT)
Dept: PODIATRY | Facility: CLINIC | Age: 74
End: 2024-08-28
Payer: MEDICARE

## 2024-08-28 ENCOUNTER — OFFICE VISIT (OUTPATIENT)
Dept: ENDOCRINOLOGY | Facility: CLINIC | Age: 74
End: 2024-08-28
Payer: MEDICARE

## 2024-08-28 ENCOUNTER — DOCUMENTATION ONLY (OUTPATIENT)
Dept: REHABILITATION | Facility: OTHER | Age: 74
End: 2024-08-28
Payer: MEDICARE

## 2024-08-28 VITALS
HEIGHT: 68 IN | WEIGHT: 145.81 LBS | BODY MASS INDEX: 22.1 KG/M2 | DIASTOLIC BLOOD PRESSURE: 62 MMHG | SYSTOLIC BLOOD PRESSURE: 102 MMHG

## 2024-08-28 DIAGNOSIS — N18.31 STAGE 3A CHRONIC KIDNEY DISEASE: ICD-10-CM

## 2024-08-28 DIAGNOSIS — E21.0 PRIMARY HYPERPARATHYROIDISM: Primary | ICD-10-CM

## 2024-08-28 DIAGNOSIS — M81.8 OTHER OSTEOPOROSIS WITHOUT CURRENT PATHOLOGICAL FRACTURE: ICD-10-CM

## 2024-08-28 PROCEDURE — G2211 COMPLEX E/M VISIT ADD ON: HCPCS | Mod: S$GLB,,, | Performed by: INTERNAL MEDICINE

## 2024-08-28 PROCEDURE — 3078F DIAST BP <80 MM HG: CPT | Mod: CPTII,S$GLB,, | Performed by: INTERNAL MEDICINE

## 2024-08-28 PROCEDURE — 1157F ADVNC CARE PLAN IN RCRD: CPT | Mod: CPTII,S$GLB,, | Performed by: INTERNAL MEDICINE

## 2024-08-28 PROCEDURE — 3288F FALL RISK ASSESSMENT DOCD: CPT | Mod: CPTII,S$GLB,, | Performed by: INTERNAL MEDICINE

## 2024-08-28 PROCEDURE — 3074F SYST BP LT 130 MM HG: CPT | Mod: CPTII,S$GLB,, | Performed by: INTERNAL MEDICINE

## 2024-08-28 PROCEDURE — 1126F AMNT PAIN NOTED NONE PRSNT: CPT | Mod: CPTII,S$GLB,, | Performed by: INTERNAL MEDICINE

## 2024-08-28 PROCEDURE — 99999 PR PBB SHADOW E&M-EST. PATIENT-LVL IV: CPT | Mod: PBBFAC,,, | Performed by: INTERNAL MEDICINE

## 2024-08-28 PROCEDURE — 99214 OFFICE O/P EST MOD 30 MIN: CPT | Mod: S$GLB,,, | Performed by: INTERNAL MEDICINE

## 2024-08-28 PROCEDURE — 1160F RVW MEDS BY RX/DR IN RCRD: CPT | Mod: CPTII,S$GLB,, | Performed by: INTERNAL MEDICINE

## 2024-08-28 PROCEDURE — 1159F MED LIST DOCD IN RCRD: CPT | Mod: CPTII,S$GLB,, | Performed by: INTERNAL MEDICINE

## 2024-08-28 PROCEDURE — 1100F PTFALLS ASSESS-DOCD GE2>/YR: CPT | Mod: CPTII,S$GLB,, | Performed by: INTERNAL MEDICINE

## 2024-08-28 NOTE — PROGRESS NOTES
Subjective:      Patient ID: Manisha Wynne is a 73 y.o. female.    Chief Complaint:   primary hyperparathyroidism:      History of Present Illness      With regards to the  primary hyperparathyroidism:  Was found to have episodic hypercalcemia on routine labs - first noted: 1/2019   Pth was checked:  4/2019 and was elevated     Indications for surgery:  osteoporosis and CKD  Parathyroid adenoma was localized --> Midline mediastinal ectopic parathyroid adenoma on CT parathyroid scan  She did see Dr Agee and med mgmt was opted for based on location      She denied current or past lithium use  Denies HCTZ use.        Daily intake of calcium is:  Taking vitamin D: 5000 iu qd     Denies constipation, depression, polyuria       With regards to the osteoporosis  Last bmd was:8/2/24  Fracture Risk (FRAX)     19.0% risk of a major osteoporotic fracture in the next 10 years.     4.7% risk of hip fracture in the next 10 years.     Impression:     *Osteoporosis on treatment with Prolia    Denies kidney stones    prone to falls - sensory neuropathy - was part of a [facility] trial - stopped due to covid   Right patella fracture   2009 right wrist   2012 clavicle    2020 left wrist after a fall- Status post ORIF of the distal left radius   2/2024 ORIF right olecranon     Using a cane     We   started Prolia in 2020    She did get her first dose 8/7/20  Last 3/8/24      Had crown placed and she healed     Adopted - no known FH      For her CKD--- she is followed  by Nephrology      Review of Systems  As above     Objective:   Physical Exam  Vitals reviewed.   Neck:      Thyroid: No thyromegaly.   Cardiovascular:      Rate and Rhythm: Normal rate.   Pulmonary:      Effort: Pulmonary effort is normal.   Abdominal:      Palpations: Abdomen is soft.         Body mass index is 22.17 kg/m².    Lab Review:   Lab Results   Component Value Date    HGBA1C 5.5 11/16/2023     Lab Results   Component Value Date    CHOL 151 03/08/2024     HDL 56 03/08/2024    LDLCALC 80.6 03/08/2024    TRIG 72 03/08/2024    CHOLHDL 37.1 03/08/2024     Lab Results   Component Value Date     03/08/2024    K 3.9 03/08/2024     03/08/2024    CO2 24 03/08/2024     03/08/2024    BUN 22 03/08/2024    CREATININE 1.2 03/08/2024    CALCIUM 10.5 03/08/2024    PROT 6.4 03/08/2024    ALBUMIN 3.4 (L) 03/08/2024    BILITOT 0.6 03/08/2024    ALKPHOS 78 03/08/2024    AST 22 03/08/2024    ALT 18 03/08/2024    ANIONGAP 10 03/08/2024    ESTGFRAFRICA 58 (A) 01/31/2022    EGFRNONAA 50 (A) 01/31/2022    TSH 3.125 03/08/2024           Assessment and Plan     Primary hyperparathyroidism   Surgical indications are ckd and osteoporosis but surgery would be more difficult due to location   For now we will observe Calcium every 6 months if hypercalcemia becomes a significant problem we can do medical management with cinacalcet       Avoid dehydration, excessive calcium supplementation and meds (HCTZ)  that can worsen hypercalcemia.       Stage 3a chronic kidney disease  As above       Other osteoporosis without current pathological fracture  Based on fragility fracture and FRAX  She is at very high risk for subsequent falls and fractures.    Continue prolia            Routine labs in November when she sees her PCP   Return to clinic in 1 year

## 2024-08-28 NOTE — PROGRESS NOTES
Health  Wellness Visit Note    Name: Manisha Wynne  Clinic Number: [id number]  Physician: Sarmad Varghese PA-C  Diagnosis: No diagnosis found.  Past Medical History:   Diagnosis Date    Abnormal Pap smear     Atrial fibrillation     AV block, 1st degree 07/25/2012    C. difficile diarrhea     RESOLVED    Cataract     Depression 07/24/2012    Facet arthritis of lumbar region 03/31/2015    Falls     3 falls in the last 6 mos--noted 6/19/19    Hyperlipidemia     Hypertension 07/24/2012    Neuropathy     Other specified cardiac dysrhythmias(427.89)     Sleep apnea     Syncope 07/24/2012    Tremors of nervous system     hands bilaterally     Visit Number: 23  Precautions: standard, fall, R elbow fracture       1st PT visit:  4/24/2024  Year of care end date:  4/24/2025  Mindbody plan: Month to Month  Patient level:C    Time In: 9:08 AM  Time Out: 9:54 AM  Total Treatment Time: 46 minutes    [employer] 2022  Handout on this week's wellness topic Journaling provided  along with a discussion on what it means, the benefits, and suggestions for practice.  Reviewed last week's topic of Stages of Change.    Subjective:   Patient reports 4/10 back today. Patient does stretching often at home. Patient did not use ice this past week.     Objective:   Manisha completed therapeutic stretches (EIL, ROBERT) and the following MedX exercise machines: core lumbar, torso rotation l/r, leg extension, leg curl, upright row, chest press, biceps curl, triceps extension, leg press    See exercise log in patient folder for rate of exertion and repetitions completed.       Fitness Machine Education Key:  E=education on equipment initiated and further follow up and education needed  I=independent with  and exercise.  The patient:  Adjusts machines to his/her settings  Uses equipment levers, pins, weights safely  Maintains safe and correct posture while exercising  Moves through exercise with correct pace and control  Gets  on and off equipment safely      Lumbar/Cervical Ext.  Torso Rotation  Leg Press    Leg Extension  Seated Leg Curl  Chest Press    Seated Row  Hip ADD  Hip ABD    Triceps Extension  Bicep Curl  Other:      [x] Indicates exercise has been taught for home  Lumbar/Cervical Ext. [] Torso Rotation [] Leg Press []   Leg Extension [] Seated Leg Curl [] Chest Press []   Seated Row [] Hip ADD [] Hip ABD []   Triceps Extension [] Bicep Curl [] Other:        Assessment:   Patient tolerated Patient tolerated MedX Core Lumbar Strength and all other peripheral exercises without an increase in symptoms. Patient warmed up on for 5 minutes, stretched, and iced low back for 5 minutes after the workout.     Plan:  Continue with established plan of care towards wellness goals.     Health  : Bashir Fuentes  8/28/2024

## 2024-08-28 NOTE — ASSESSMENT & PLAN NOTE
Based on fragility fracture and FRAX  She is at very high risk for subsequent falls and fractures.    Continue prolia

## 2024-08-29 ENCOUNTER — CLINICAL SUPPORT (OUTPATIENT)
Dept: REHABILITATION | Facility: HOSPITAL | Age: 74
End: 2024-08-29
Payer: MEDICARE

## 2024-08-29 DIAGNOSIS — M25.521 ELBOW PAIN, RIGHT: Primary | ICD-10-CM

## 2024-08-29 PROCEDURE — 97530 THERAPEUTIC ACTIVITIES: CPT | Mod: HCNC

## 2024-08-29 PROCEDURE — 97018 PARAFFIN BATH THERAPY: CPT | Mod: HCNC

## 2024-08-29 PROCEDURE — 97140 MANUAL THERAPY 1/> REGIONS: CPT | Mod: HCNC

## 2024-08-29 NOTE — PROGRESS NOTES
[facility]  Occupational Therapy Treatment Note    Date: 8/29/2024  Name: Manisha Wynne  Clinic Number: [id number]    Therapy Diagnosis: R olecranon Fx w/ ORIF 2/28/2024  1. Elbow pain, right            Medical Diagnosis: R Olecranon fracture with ORIF 2/28/24  ICD-10:   Z98.890 (ICD-10-CM) - Post-operative state   S52.021A (ICD-10-CM) - Closed fracture of olecranon process of right ulna, initial encounter      Physician: Sarmad Varghese PA-C; Dr. DESIREE Guevara   Physician Orders: Eval and Treat  R Elbow Olecranon ORIF  DOS: 2/28/24  12 Visits     Surgical Procedure and Date: 2/28/24,   Procedure(s) (LRB):  ORIF, ELBOW (Right)        Evaluation Date: 4/23/2024     Plan of Care Certification Period: 11/23/24     Date of Return to MD: 9/10/24     Visit # / Visits authorized: 24/ 40   Insurance Authorization Period Expiration: 12/31/24     FOTO #1: FSM 47% / GOAL 65%  FOTO #2: 5/22/24 FSM    52% / goal 65%   FOTO #3:      Precautions:  Standard and Weightbearing; Fall Risk      Time In: 10:00am   Time Out: 1030am  Total Appointment Time (timed & untimed codes):30 minutes    SUBJECTIVE   Patient casted 12 weeks, cast removed 5/10/24     Pt reports:  I'm still going to PT and trying to get started at the gym.     She was compliant with home exercise program given last session.   Response to previous treatment:supination improved   Functional change: none     Pain: 4/10  Location: right elbow sore stiff      OBJECTIVE   Do FOTO next session    Objective Measures updated at progress report unless specified.    Edema. Measured in centimeters.    4/23/2024 5/22/24          Proximal Wrist Crease 15.8 cm =   Proximal Palmar Crease       MCPs            UE  ROM. Measured in degrees.    4/23/2024 5/15/24 6/17/2024 8/5/24 8/29/24             Wrist ext/flex  30 / 45  =/= 43/65 38/65 (+20) 40/65   Sup/pro  70 / 70 75/90 80/90 90(+20)/90(+20)              Elbow ext / flex  52/122 25/ 130 20/145  "20(+32)/148 (+26)  20/150                   Strength (Dyanmometer) and Pinch Strength (Pinch Gauge)  Measured in pounds and psi. Average of three trials.    6/17/2024 6/17/2024 8/5/24 8/29/24     Right Left Right  right   Rung II 34.2 41.4 38 (+4) 37    [place] Pinch 8 8 =    3pt Pinch 8.5 8 =    2pt Pinch 6 5 =      Treatment     Manisha received the treatments listed below:     Supervised Modalities after being cleared for contradictions: 10 min  paraffin to to R elbow and hand  x 10 min to increase blood flow, circulation and tissue elasticity prior to therex.      Therapeutic Activities to improve functional performance for 25 minutes, including:  - 2# wrist flex, ext ( in fishtail position), RD, UD x 20 reps each   - 2# elbow flex, extension palm up x 20 reps  - 1#  hammer for sup/pro x 10 reps   - Biceps stretch (in sitting): 2 lbs.  x 20"  - - Red Putty: log roll with tripod pinch; gripping; extension blooms x 10 each (NT)  -   Patient Education and Home Exercises      Education provided:   - Cont HEP   - Progress towards goals     Written Home Exercises Provided: Patient instructed to cont prior HEP.  Exercises were reviewed and Manisha was able to demonstrate them prior to the end of the session.  Manisha demonstrated good  understanding of the HEP provided. See EMR under Patient Instructions for exercises provided during therapy sessions.       Assessment      Elbow strength remains limited. Elbow extension improving.  Progressing well with strengthening.   remains slightly limited.  Therapy to continue to focus on end range of motion and strengthening for improved performance of functional activity.     Manisha is progressing well towards her goals and there are no updates to goals at this time. Pt prognosis is Good. Will progress with  and pinch strengthening.     Pt will continue to benefit from skilled outpatient occupational therapy to address the deficits listed in the problem list on initial " evaluation provide pt/family education and to maximize pt's level of independence in the home and community environment.     Pt's spiritual, cultural and educational needs considered and pt agreeable to plan of care and goals.    Anticipated barriers to occupational therapy: none     The following goals were discussed with the patient and patient is in agreement with them as to be addressed in the treatment plan.      Short Term Goals: (6 weeks):  1)  Patient to be IND with HEP and modalities for pain management- met  2)  Increase ROM 10-20 degrees to increase functional hand use for ADLs/work/leisure activities- MET  3)   Decrease edema .1-.5mm to increase joint mobility /flexibility for functional hand use. - met  4)  Assess   and pinch and set goals accordingly MET     Long Term Goals :To be met by discharge (12 weeks)   1)  Increase  strength 2-8 lbs. For driving, cooking - met, continuing   2) Increase pinch strength 1-4 psi's for opening bottles and FM coordination   3)   Increase ROM 21-40 degrees to increase functional hand use for ADLs/work/leisure activities- met  4) Patient to score at _60_% OR MORE (FSM)  on FOTO to demonstrate improved perception of functional R hand  Use.     Plan   Certification Period/Plan of care expiration: 8/29/24 to 11/29/24    I certify the need for these services furnished under the plan of treatment and while under my care.     ____________________________________________________________________  Physician/Referring Practitioner   Date of Signature       Desi Medellin, OT , CHT

## 2024-09-03 ENCOUNTER — PROCEDURE VISIT (OUTPATIENT)
Dept: NEUROLOGY | Facility: CLINIC | Age: 74
End: 2024-09-03
Payer: MEDICARE

## 2024-09-03 DIAGNOSIS — G56.23 CUBITAL TUNNEL SYNDROME OF BOTH UPPER EXTREMITIES: ICD-10-CM

## 2024-09-03 DIAGNOSIS — G56.03 BILATERAL CARPAL TUNNEL SYNDROME: ICD-10-CM

## 2024-09-03 PROCEDURE — 95886 MUSC TEST DONE W/N TEST COMP: CPT | Mod: HCNC,S$GLB,, | Performed by: PHYSICAL MEDICINE & REHABILITATION

## 2024-09-03 PROCEDURE — 95911 NRV CNDJ TEST 9-10 STUDIES: CPT | Mod: HCNC,S$GLB,, | Performed by: PHYSICAL MEDICINE & REHABILITATION

## 2024-09-03 NOTE — PROCEDURES
Test Date:  9/3/2024    Patient: manisha wynne : 1950 Physician: Sharath Sibley D.O.   ID#: [id number] SEX: Female Ref. Phys: Sarmad Varghese PA-C     HPI: Manisha Wynne is a 73 y.o.female who presents for NCS/EMG to evaluate for right ulnar neuropathy s/p right elbow fracture s/p ORIF.  Left UE checked on NCS for comparison of amplitudes.      PROCEDURE:  Prior to the procedure, the procedure was discussed in detail with the patient.  All questions were answered, and verbal consent was obtained.  For nerve conduction studies, a combination of surface electrodes, bar electrodes, and/or ring electrodes were used as needed.  For needle EMG, each site was cleaned and prepped in usual fashion with an alcohol pad.  A monopolar needle (28G) was used.  There was no significant bleeding, and bandages were applied as needed.  The procedure was tolerated without adverse effect.  The patient was instructed on post-procedure care including ice if needed for 10-15 minutes up to 4 times/day for any sore muscles.  I discussed with the patient that the data would be reviewed and a report sent to the referring provider, where any follow up questions regarding next steps should be directed.        NCV & EMG Findings:  All nerve conduction studies (as indicated in the following tables) were within normal limits.  All examined muscles (as indicated in the following table) showed no evidence of electrical instability.    IMPRESSIONS:  This is a normal electrophysiologic study of the right upper extremity.          ___________________________  Sharath Sibley D.O.        NCS+  Motor Nerve Results      Latency Amplitude F-Lat Segment Distance CV Comment   Site (ms) Norm (mV) Norm (ms)  (cm) (m/s) Norm    Left Median (APB)   Wrist 3.4  < 4.4 6.5  > 3.8         Elbow 7.5 - 5.8 -  Elbow-Wrist 23 56  > 51    Right Median (APB)   Wrist 3.7  < 4.4 5.7  > 3.8         Elbow 7.8 - 6.5 -  Elbow-Wrist 24 59  > 51    Left  Ulnar (ADM)   Wrist 2.4  < 3.7 6.7  > 3.0         Bel Elbow 6.2 - 5.3 -  Bel Elbow-Wrist 25 66  > 52    Right Ulnar (ADM)   Wrist 2.4  < 3.7 7.4  > 3.0         Bel Elbow 7.2 - 7.1 -  Bel Elbow-Wrist 25 52  > 52    Abv Elbow 8.4 - 6.7 -  Abv Elbow-Bel Elbow 9 75  > 43    Right Ulnar (FDI)   Wrist 3.8 - 6.9 -         Bel Elbow 7.5 - 7.7 -  Bel Elbow-Wrist 25 68  > 52    Abv Elbow 8.7 - 7.9 -  Abv Elbow-Bel Elbow 9 75  > 43      Sensory Nerve Results      Start Lat Latency (Peak) Amplitude (P-P) Segment Distance Start CV Comment   Site (ms) Norm (ms) (µV) Norm  (cm) (m/s) Norm    Left Median (Rec:Dig II)   Wrist 2.3  < 3.3 3.3 20  > 15 Wrist-Dig II 14 61  > 42    Right Median (Rec:Dig II)   Wrist 2.5  < 3.3 3.2 20  > 15 Wrist-Dig II 14 56  > 42    Left Ulnar (Rec:Dig V)   Wrist 2.3  < 3.1 2.9 17  > 13 Wrist-Dig V 14 61  > 45    Right Ulnar (Rec:Dig V)   Wrist 1.88  < 3.1 2.8 17  > 13 Wrist-Dig V 14 74  > 45    Left Radial (Rec:Wrist)   Forearm 1.33  < 2.2 1.68 17  > 11 Forearm-Wrist 10 75 -    Right Radial (Rec:Wrist)   Forearm 1.33  < 2.2 1.85 13  > 11 Forearm-Wrist 10 75 -      EMG+     Side Muscle Nerve Root Ins Act Fibs Psw Amp Dur Poly Recrt Int Pat Comment   Right Deltoid Axillary C5-C6 Nml Nml Nml Nml Nml 0 Nml Nml    Right Biceps Musculocut C5-C6 Nml Nml Nml Nml Nml 0 Nml Nml    Right Triceps Radial C6-C8 Nml Nml Nml Nml Nml 0 Nml Nml    Right Pronator Teres Median C6-C7 Nml Nml Nml Nml Nml 0 Nml Nml    Right FDI Ulnar C8-T1 Nml Nml Nml Nml Nml 0 Nml Nml            Waveforms:    Motor              Sensory

## 2024-09-04 ENCOUNTER — TELEPHONE (OUTPATIENT)
Dept: PODIATRY | Facility: CLINIC | Age: 74
End: 2024-09-04
Payer: MEDICARE

## 2024-09-04 ENCOUNTER — DOCUMENTATION ONLY (OUTPATIENT)
Dept: REHABILITATION | Facility: OTHER | Age: 74
End: 2024-09-04
Payer: MEDICARE

## 2024-09-04 NOTE — TELEPHONE ENCOUNTER
Left VM to inform patient that Dr Ho is still out sick and appointment date and time will be rescheduled for the next available. Appointment linked to wait list for sooner evaluation.

## 2024-09-04 NOTE — TELEPHONE ENCOUNTER
Patient wanted to assure that her appointment was linked to the wait list for sooner appointment after I was able to secure her appointment for the next available with Dr Ho.

## 2024-09-04 NOTE — TELEPHONE ENCOUNTER
----- Message from Domenica De Leon sent at 9/4/2024 11:23 AM CDT -----  Regarding: Returning Missed Call  Contact: [phone]  Returning a Missed Call    Caller: Patient     Returning call to: SEAN Summers

## 2024-09-04 NOTE — PROGRESS NOTES
Health  Wellness Visit Note    Name: Manisha Wynne  Clinic Number: [id number]  Physician: Sarmad Varghese PA-C  Diagnosis: No diagnosis found.  Past Medical History:   Diagnosis Date    Abnormal Pap smear     Atrial fibrillation     AV block, 1st degree 07/25/2012    C. difficile diarrhea     RESOLVED    Cataract     Depression 07/24/2012    Facet arthritis of lumbar region 03/31/2015    Falls     3 falls in the last 6 mos--noted 6/19/19    Hyperlipidemia     Hypertension 07/24/2012    Neuropathy     Other specified cardiac dysrhythmias(427.89)     Sleep apnea     Syncope 07/24/2012    Tremors of nervous system     hands bilaterally     Visit Number: 24  Precautions: standard, fall, R elbow fracture       1st PT visit:  4/24/2024  Year of care end date:  4/24/2025  Mindbody plan: Month to Month  Patient level:C    Time In: 9:00 AM  Time Out: 10:00 AM  Total Treatment Time: 60 minutes    [employer] 2022  Handout on this week's wellness topic Body Image provided  along with a discussion on what it means, the benefits, and suggestions for practice.  Reviewed last week's topic of Journaling.    Subjective:   Patient reports minimal back pain today. Patient does stretching every other day this past week. Patient did not use ice this past week.     Objective:   Manisha completed therapeutic stretches (EIL, ROBERT) and the following MedX exercise machines: core lumbar, torso rotation l/r, leg extension, leg curl, upright row, chest press, biceps curl, triceps extension, leg press    See exercise log in patient folder for rate of exertion and repetitions completed.       Fitness Machine Education Key:  E=education on equipment initiated and further follow up and education needed  I=independent with  and exercise.  The patient:  Adjusts machines to his/her settings  Uses equipment levers, pins, weights safely  Maintains safe and correct posture while exercising  Moves through exercise with correct  pace and control  Gets on and off equipment safely      Lumbar/Cervical Ext.  Torso Rotation  Leg Press    Leg Extension E Seated Leg Curl E Chest Press    Seated Row E Hip ADD  Hip ABD E   Triceps Extension  Bicep Curl  Other:      [x] Indicates exercise has been taught for home  Lumbar/Cervical Ext. [] Torso Rotation [] Leg Press []   Leg Extension [] Seated Leg Curl [] Chest Press []   Seated Row [] Hip ADD [] Hip ABD []   Triceps Extension [] Bicep Curl [] Other:        Assessment:   Patient tolerated Patient tolerated MedX Core Lumbar Strength and all other peripheral exercises without an increase in symptoms. Patient warmed up on for 5 minutes, stretched, and iced low back for 5 minutes after the workout.     Plan:  Continue with established plan of care towards wellness goals.     Health  : Bashir Fuentes  9/4/2024

## 2024-09-06 ENCOUNTER — TELEPHONE (OUTPATIENT)
Dept: NEUROSURGERY | Facility: CLINIC | Age: 74
End: 2024-09-06
Payer: MEDICARE

## 2024-09-10 ENCOUNTER — INFUSION (OUTPATIENT)
Dept: INFECTIOUS DISEASES | Facility: HOSPITAL | Age: 74
End: 2024-09-10
Payer: MEDICARE

## 2024-09-10 ENCOUNTER — PATIENT MESSAGE (OUTPATIENT)
Dept: REHABILITATION | Facility: HOSPITAL | Age: 74
End: 2024-09-10

## 2024-09-10 ENCOUNTER — PATIENT MESSAGE (OUTPATIENT)
Dept: ORTHOPEDICS | Facility: CLINIC | Age: 74
End: 2024-09-10
Payer: MEDICARE

## 2024-09-10 VITALS
BODY MASS INDEX: 22.38 KG/M2 | TEMPERATURE: 98 F | RESPIRATION RATE: 18 BRPM | HEART RATE: 62 BPM | SYSTOLIC BLOOD PRESSURE: 128 MMHG | HEIGHT: 68 IN | WEIGHT: 147.69 LBS | DIASTOLIC BLOOD PRESSURE: 61 MMHG | OXYGEN SATURATION: 100 %

## 2024-09-10 DIAGNOSIS — N18.31 STAGE 3A CHRONIC KIDNEY DISEASE: Primary | ICD-10-CM

## 2024-09-10 DIAGNOSIS — M81.8 OTHER OSTEOPOROSIS WITHOUT CURRENT PATHOLOGICAL FRACTURE: ICD-10-CM

## 2024-09-10 PROCEDURE — 96372 THER/PROPH/DIAG INJ SC/IM: CPT | Mod: HCNC

## 2024-09-10 PROCEDURE — 63600175 PHARM REV CODE 636 W HCPCS: Mod: JZ,JG,HCNC | Performed by: INTERNAL MEDICINE

## 2024-09-10 RX ADMIN — DENOSUMAB 60 MG: 60 INJECTION SUBCUTANEOUS at 03:09

## 2024-09-10 NOTE — PROGRESS NOTES
Patient arrives for Prolia injection - confirms use of calcium and vitamin D supplements and denies dental procedures over past 3 months - administered per guidelines    Limited head-to-toe assessment due to privacy issues and visit reason though the opportunity was given for patient to express any concerns

## 2024-09-12 ENCOUNTER — OFFICE VISIT (OUTPATIENT)
Dept: NEUROSURGERY | Facility: CLINIC | Age: 74
End: 2024-09-12
Payer: MEDICARE

## 2024-09-12 DIAGNOSIS — Z98.1 S/P CERVICAL SPINAL FUSION: Primary | ICD-10-CM

## 2024-09-12 PROCEDURE — 1157F ADVNC CARE PLAN IN RCRD: CPT | Mod: HCNC,CPTII,S$GLB, | Performed by: NEUROLOGICAL SURGERY

## 2024-09-12 PROCEDURE — 99214 OFFICE O/P EST MOD 30 MIN: CPT | Mod: HCNC,S$GLB,, | Performed by: NEUROLOGICAL SURGERY

## 2024-09-12 NOTE — PROGRESS NOTES
"Neurosurgery  Follow up     SUBJECTIVE:     Chief Complaint: "here for neck evaluation"     History of Present Illness:  Manisha Wynne is a 73 y.o. female (former  at [employer] [facility] [place]) who presents as a referral from Dr. Mcduffie for evaluation of cervical myelopathy. The patient reports that she has had several falls from [id number], which prompted her to seek neurologic evaluation. The falls have resulted in injuries, including breaking her wrist. She also has numbness, tingling, and burning in her feet. She denies pain in her neck, but endorses in her mid- and low back. Her pain is 10/10 in the legs, 10/10 in the back, and 0/10 in the neck and arms. The pain is intermittent. It is made worse by sitting. It improves with standing. She denies any change in bowel/bladder habit.     She endorses handwriting changes, dropping objects, and balance/gait difficulty. She denies fine motor changes. She has not seen pain management for her neck, but she is seeing Dr. Booker for "sciatica."     CT of the cervical spine (the patient has a non-MRI-conditional compatible pacemaker in place) was obtained and personally reviewed, demonstrating significant stenosis at C5-C6>C4-C5. Flex ex shows some mild instability of C3 on C4.     She lives alone with her tuxedo cats. Her family is in [place] and [facility]. She does have friends nearby who will help to care for her in perioperative periods. She takes ASA 81 daily.     As of 5/6/21, the patient had the myelogram as requested. This is personally reviewed and demonstrates significant cord compression and OPLL. There is some stenosis at L4-L5 as well.     The patient reports that she has been well. She has had no falls. She has been doing PT for her sciatica, which she has found helpful. She does note that her tremor, which may be progressing slightly, is now interfering with her applying makeup at times.     As of 5/25/21, the patient reports that she has not had any significant " changes. She presents today with a list of questions, together with friend Kavya Roberts on the phone. She also endorses a several year history of urinary incontinence (episodes occur overnight or with coughing) for which she was going to participate in pelvic floor rehab and see urogyn; however, this was interrupted by Covid.     As of 1/11/22, the patient reports that she has had ongoing C. Diff since we scheduled surgery. She was seen about a potential colonoscopy today, but was advised against proceeding. She is due for a CT of the abdomen/pelvis next week to look at the pancreas. She is also being considered for a fecal transplant. There is also a clinical trial going on in which she's interested.     Dr. Maradiaga of ID has been quarterbacking the C. Diff response. The patient is frustrated by her lack of improvement.     She is less fatigued now than when she initially became sick, but she does not feel she has yet recovered to her baseline. She is also being recommended for cataract surgery and pacemaker generator replacement in about 13 months.     As of 8/4/22, the patient reports that she is finally over her C. Diff. 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 She notes that she has been really careful. She walks very slowly. She has noticed some tremor in her hands at rest. This gets better when she holds onto an object. She denies anosmia. She denies significant sleep difficulties and other non-motor symptoms of PD.     As of 12/22/22, the patient underwent C3-C6 posterior cervical decompression and fusion on 11/14/22. She reports that she has been doing well since surgery. She has NOT been using a bone growth stimulator. She denies fever, chills, or drainage from the incision. She has no complaints of C. Diff. She is eager to return to work in the 's office.     As of 2/2/23, the patient returns in scheduled follow-up. She reports she has been well. She is walking 6000  steps several times a week. She has returned to work, though she has been a bit more forgetful about dates and times. We discussed Dr. Cordero.     She saw Sunitha this morning, who is starting outpatient PT.     Bone growth stimulator was received, and she has been using it.     She reports that she had a mechanical trip and fall in which her head didn't hit the ground last week.     As of 5/4/23, the patient returns in scheduled postoperative follow up. She reports that she has one additional month of PT.     She reports that she has had 3 falls since we saw each other last, but all of them have been mechanical. One she slipped on water, one she was getting up from the floor, once when walking on uneven ground (sidewalk was dug up). She notes that she moved about a month ago.      She occasionally has headaches. She has occasional neck pain in the left upper portion, but this has been improving. The cats have been well. She is back at work 2 days a week.     As of 2/22/24, the patient returns in scheduled follow-up. Overall, she has been well, though she is still falling (most recently in December). She sustained a head laceration after a fall at [facility] in November that now appears well healed.     She requests a referral to neurology for her ongoing neuropathy.     She is also complaining of low back pain with sciatica. She denies any new weakness associated with this and is interested in doing [facility].     Propanolol has been helping her tremor.     As of 9/12/24, the patient returns after having completed myelogram of the entire spine. This demonstrates that the right C3 screw is too medial (as previously appreciated) but that there is no mass effect on the thecal sac nor spinal cord.     She is still hoping to re-establish with a neurologist for her peripheral neuropathy. 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 She is still working.      Review of  patient's allergies indicates:  No Known Allergies    Current Outpatient Medications   Medication Sig Dispense Refill    acetaminophen (TYLENOL) 500 MG tablet Take 2 tablets (1,000 mg total) by mouth 2 (two) times daily as needed for Pain. Begin post op day 3. 50 tablet 0    aspirin (ECOTRIN) 81 MG EC tablet Take 1 tablet (81 mg total) by mouth 2 (two) times a day. 60 tablet 0    buPROPion (WELLBUTRIN XL) 300 MG 24 hr tablet Take 1 tablet by mouth once daily.      estradioL (ESTRACE) 0.01 % (0.1 mg/gram) vaginal cream Use one gram nightly x 2 weeks then twice weekly 42.5 g 3    lisdexamfetamine (VYVANSE) 70 MG capsule Take 70 mg by mouth every morning.      losartan (COZAAR) 25 MG tablet TAKE 1 TABLET ONE TIME DAILY 90 tablet 3    melatonin (MELATIN) 5 mg Take 1 tablet by mouth every evening. (For insomnia)      methocarbamoL (ROBAXIN) 500 MG Tab One - two daily as needed for back pain 40 tablet 0    methyl salicylate-menthol 15-10% 15-10 % Crea Apply topically every evening. 113 g 0    pravastatin (PRAVACHOL) 40 MG tablet Take 1 tablet (40 mg total) by mouth once daily. 90 tablet 1    PROLIA 60 mg/mL Syrg Inject 60 mg into the skin every 6 (six) months.      propranoloL (INDERAL) 20 MG tablet TAKE 1 TABLET TWICE DAILY 180 tablet 3    vibegron (GEMTESA) 75 mg Tab Take 1 tablet (75 mg total) by mouth once daily. 30 tablet 11     No current facility-administered medications for this visit.       Past Medical History:   Diagnosis Date    Abnormal Pap smear     Atrial fibrillation     AV block, 1st degree 07/25/2012    C. difficile diarrhea     RESOLVED    Cataract     Depression 07/24/2012    Facet arthritis of lumbar region 03/31/2015    Falls     3 falls in the last 6 mos--noted 6/19/19    Hyperlipidemia     Hypertension 07/24/2012    Neuropathy     Other specified cardiac dysrhythmias(427.89)     Sleep apnea     Syncope 07/24/2012    Tremors of nervous system     hands bilaterally     Past Surgical History:    Procedure Laterality Date    APPLICATION OF CARTILAGE GRAFT Bilateral 12/19/2019    Procedure: APPLICATION, CARTILAGE GRAFT AURICULAR JEZ;  Surgeon: Martinez Flores III, MD;  Location: [facility];  Service: ENT;  Laterality: Bilateral;    CARDIAC PACEMAKER PLACEMENT  09/07/2012    [id number]    CATARACT EXTRACTION W/  INTRAOCULAR LENS IMPLANT Right 5/16/2022    Procedure: EXTRACTION, CATARACT, WITH IOL INSERTION;  Surgeon: Ines Aguilera MD;  Location: [facility];  Service: Ophthalmology;  Laterality: Right;  Catalys     CATARACT EXTRACTION W/  INTRAOCULAR LENS IMPLANT Left 6/20/2022    Procedure: EXTRACTION, CATARACT, WITH IOL INSERTION;  Surgeon: Ines Aguilera MD;  Location: [facility] OR;  Service: Ophthalmology;  Laterality: Left;  Catalys     DILATION AND CURETTAGE OF UTERUS      Hx of precancerous cells?    ENDOSCOPIC ULTRASOUND OF UPPER GASTROINTESTINAL TRACT N/A 7/5/2019    Procedure: ULTRASOUND, UPPER GI TRACT, ENDOSCOPIC;  Surgeon: Kev Calderon MD;  Location: [facility] ENDO (2ND FLR);  Service: Endoscopy;  Laterality: N/A;  Pacemaker-Adam   appt confirmed-rb    MYELOGRAPHY N/A 5/4/2021    Procedure: Myelogram  CERVICAL, THORACIC AND LUMBAR;  Surgeon: [facility] Provider;  Location: [facility] OR 2ND FLR;  Service: Radiology;  Laterality: N/A;    NASAL SEPTOPLASTY N/A 12/19/2019    Procedure: SEPTOPLASTY, NOSE;  Surgeon: Martinez Flores III, MD;  Location: [facility];  Service: ENT;  Laterality: N/A;  FOLLOW DR SALVATORE KIMBROUGH PROTOCOL    OPEN REDUCTION AND INTERNAL FIXATION (ORIF) OF FRACTURE OF DISTAL RADIUS Left 1/24/2020    Procedure: ORIF, FRACTURE, RADIUS, DISTAL-left;  Surgeon: Ellen Moe MD;  Location: [facility];  Service: Orthopedics;  Laterality: Left;    OPEN REDUCTION AND INTERNAL FIXATION (ORIF) OF INJURY OF ELBOW Right 2/28/2024    Procedure: ORIF, ELBOW;  Surgeon: Ellen Moe MD;  Location: [facility];  Service: Orthopedics;  Laterality: Right;    POSTERIOR FUSION OF  CERVICAL SPINE WITH LAMINECTOMY N/A 2022    Procedure: LAMINECTOMY, SPINE, CERVICAL, WITH POSTERIOR FUSION C3-C6;  Surgeon: Nicolette Cheek MD;  [address];  Service: Neurosurgery;  Laterality: N/A;  TORONTO III, ASA III, BLOOD TYPE AND SCREEN, NEUROMONITORING EMG SEP MEP, BRACE/HALO [place], BED REGULAR BED, HEADREST MARINO, POSITION PRONE, SPECIAL EQUIPMENT NIKI MIDDLETON, RADIOLOGY C-ARM, EXT. BONE STIMULATOR BIOMET    REPLACEMENT OF PACEMAKER GENERATOR Left 10/7/2022    Procedure: REPLACEMENT, PACEMAKER GENERATOR;  Surgeon: John Sheets MD;  Location: [facility] EP LAB;  Service: Cardiology;  Laterality: Left;  YAS, SSS, AVB, Dual PPM Gen Change,SJM, MAC ,IN, 3 prep,** Adam dcPPM in situ**    SURGICAL REMOVAL OF NASAL TURBINATE Bilateral 2019    Procedure: EXCISION, NASAL TURBINATE;  Surgeon: Martinez Flores III, MD;  Location: [facility];  Service: ENT;  Laterality: Bilateral;    TONSILLECTOMY      WISDOM TOOTH EXTRACTION       Family History    None       Social History     Socioeconomic History    Marital status:    Occupational History    Occupation: /[place]     Employer: [employer]   Tobacco Use    Smoking status: Former     Current packs/day: 0.00     Average packs/day: 0.3 packs/day for 15.0 years (3.8 ttl pk-yrs)     Types: Cigarettes     Start date: 1967     Quit date: 1982     Years since quittin.1    Smokeless tobacco: Former   Substance and Sexual Activity    Alcohol use: Yes     Comment: no daily or heavy use, ~4 times per month    Drug use: No    Sexual activity: Not Currently     Partners: Male     Social Determinants of Health     Financial Resource Strain: Low Risk  (2024)    Overall Financial Resource Strain (CARDIA)     Difficulty of Paying Living Expenses: Not hard at all   Food Insecurity: No Food Insecurity (2024)    Hunger Vital Sign     Worried About Running Out of Food in the Last Year: Never true     Ran  Out of Food in the Last Year: Never true   Transportation Needs: No Transportation Needs (5/17/2024)    TRANSPORTATION NEEDS     Transportation : No   Physical Activity: Insufficiently Active (5/17/2024)    Exercise Vital Sign     Days of Exercise per Week: 2 days     Minutes of Exercise per Session: 60 min   Stress: No Stress Concern Present (5/17/2024)    [employer] [place] [employer] - Occupational Stress Questionnaire     Feeling of Stress : Not at all   Housing Stability: Low Risk  (5/17/2024)    Housing Stability Vital Sign     Unable to Pay for Housing in the Last Year: No     Homeless in the Last Year: No       Review of Systems   Constitutional: Negative for fever.        Weight loss   HENT: Negative for nosebleeds.    Eyes: Positive for visual disturbance.   Respiratory: Negative for shortness of breath.    Cardiovascular: Negative for chest pain.   Gastrointestinal: Negative for constipation.   Endocrine: Negative for cold intolerance.   Genitourinary: Negative for difficulty urinating.   Musculoskeletal: Positive for back pain and gait problem. Negative for neck pain.   Skin: Negative for color change.   Neurological: Negative for headaches.   Hematological: Does not bruise/bleed easily.   Psychiatric/Behavioral: Positive for dysphoric mood. The patient is nervous/anxious.        OBJECTIVE:     Vital Signs     There is no height or weight on file to calculate BMI.    Physical Exam:     Constitutional: She appears well-developed and well-nourished. No distress.      Eyes: EOM are normal.      Abdominal: Soft.      Skin: Skin displays no rash on extremities. Skin displays no lesions on extremities.     Psych/Behavior: She is alert. She is oriented to person, place, and time.     Musculoskeletal:      Comments: Guarded, myelopathic gait  Hand intrinsics 5/5 bilaterally   Biceps 5    Decreased arm swing on right     Neurological:        Coordination: She has normal finger to nose coordination.   +  Maria's bilaterally   Tremulous with R>L resting tremor      Pulmonary: symmetric bilaterally     Diagnostic Results:  Myelogram of total spine personally reviewed with patient   Cervical as above  Thoracolumbar with multiple levels of degenerative change, some L1-L2 and L4-L5 stenosis     ASSESSMENT/PLAN:     Manisha Wynne is a 73 y.o. female with cervical stenosis and myelopathy. She is now s/p C3-C6 laminectomy and fusion on 11/14/22.     She is doing well overall. She has been enjoying the [facility]ness program and continues to do exercises regularly at [place].     I do not recommend hardware revision at this time given that the screw does not appear to impinge the neural elements. She is in agreement with this plan.    If she would like further workup, she will contact me, and I will be happy to see her back at any time.     I have encouraged her to contact the clinic in interim with any questions, concerns, or adverse clinical change.

## 2024-09-17 NOTE — PLAN OF CARE
[facility]  Occupational Therapy Treatment Note    Date: 8/29/2024  Name: Manisha Wynne  Clinic Number: [id number]    Therapy Diagnosis: R olecranon Fx w/ ORIF 2/28/2024  1. Elbow pain, right            Medical Diagnosis: R Olecranon fracture with ORIF 2/28/24  ICD-10:   Z98.890 (ICD-10-CM) - Post-operative state   S52.021A (ICD-10-CM) - Closed fracture of olecranon process of right ulna, initial encounter      Physician: Sarmad Varghese PA-C; Dr. DESIREE Guevara   Physician Orders: Eval and Treat  R Elbow Olecranon ORIF  DOS: 2/28/24  12 Visits     Surgical Procedure and Date: 2/28/24,   Procedure(s) (LRB):  ORIF, ELBOW (Right)        Evaluation Date: 4/23/2024     Plan of Care Certification Period: 11/23/24     Date of Return to MD: 9/10/24     Visit # / Visits authorized: 24/ 40   Insurance Authorization Period Expiration: 12/31/24     FOTO #1: FSM 47% / GOAL 65%  FOTO #2: 5/22/24 FSM    52% / goal 65%   FOTO #3:      Precautions:  Standard and Weightbearing; Fall Risk      Time In: 10:00am   Time Out: 1030am  Total Appointment Time (timed & untimed codes):30 minutes    SUBJECTIVE   Patient casted 12 weeks, cast removed 5/10/24     Pt reports:  I'm still going to PT and trying to get started at the gym.     She was compliant with home exercise program given last session.   Response to previous treatment:supination improved   Functional change: none     Pain: 4/10  Location: right elbow sore stiff      OBJECTIVE   Do FOTO next session    Objective Measures updated at progress report unless specified.    Edema. Measured in centimeters.    4/23/2024 5/22/24          Proximal Wrist Crease 15.8 cm =   Proximal Palmar Crease       MCPs            UE  ROM. Measured in degrees.    4/23/2024 5/15/24 6/17/2024 8/5/24 8/29/24             Wrist ext/flex  30 / 45  =/= 43/65 38/65 (+20) 40/65   Sup/pro  70 / 70 75/90 80/90 90(+20)/90(+20)              Elbow ext / flex  52/122 25/ 130 20/145  "20(+32)/148 (+26)  20/150                   Strength (Dyanmometer) and Pinch Strength (Pinch Gauge)  Measured in pounds and psi. Average of three trials.    6/17/2024 6/17/2024 8/5/24 8/29/24     Right Left Right  right   Rung II 34.2 41.4 38 (+4) 37    [place] Pinch 8 8 =    3pt Pinch 8.5 8 =    2pt Pinch 6 5 =      Treatment     Manisha received the treatments listed below:     Supervised Modalities after being cleared for contradictions: 10 min  paraffin to to R elbow and hand  x 10 min to increase blood flow, circulation and tissue elasticity prior to therex.      Therapeutic Activities to improve functional performance for 25 minutes, including:  - 2# wrist flex, ext ( in fishtail position), RD, UD x 20 reps each   - 2# elbow flex, extension palm up x 20 reps  - 1#  hammer for sup/pro x 10 reps   - Biceps stretch (in sitting): 2 lbs.  x 20"  - - Red Putty: log roll with tripod pinch; gripping; extension blooms x 10 each (NT)  -   Patient Education and Home Exercises      Education provided:   - Cont HEP   - Progress towards goals     Written Home Exercises Provided: Patient instructed to cont prior HEP.  Exercises were reviewed and Manisha was able to demonstrate them prior to the end of the session.  Manisha demonstrated good  understanding of the HEP provided. See EMR under Patient Instructions for exercises provided during therapy sessions.       Assessment      Elbow strength remains limited. Elbow extension improving.  Progressing well with strengthening.   remains slightly limited.  Therapy to continue to focus on end range of motion and strengthening for improved performance of functional activity.     Manisha is progressing well towards her goals and there are no updates to goals at this time. Pt prognosis is Good. Will progress with  and pinch strengthening.     Pt will continue to benefit from skilled outpatient occupational therapy to address the deficits listed in the problem list on initial " evaluation provide pt/family education and to maximize pt's level of independence in the home and community environment.     Pt's spiritual, cultural and educational needs considered and pt agreeable to plan of care and goals.    Anticipated barriers to occupational therapy: none     The following goals were discussed with the patient and patient is in agreement with them as to be addressed in the treatment plan.      Short Term Goals: (6 weeks):  1)  Patient to be IND with HEP and modalities for pain management- met  2)  Increase ROM 10-20 degrees to increase functional hand use for ADLs/work/leisure activities- MET  3)   Decrease edema .1-.5mm to increase joint mobility /flexibility for functional hand use. - met  4)  Assess   and pinch and set goals accordingly MET     Long Term Goals :To be met by discharge (12 weeks)   1)  Increase  strength 2-8 lbs. For driving, cooking - met, continuing   2) Increase pinch strength 1-4 psi's for opening bottles and FM coordination   3)   Increase ROM 21-40 degrees to increase functional hand use for ADLs/work/leisure activities- met  4) Patient to score at _60_% OR MORE (FSM)  on FOTO to demonstrate improved perception of functional R hand  Use.     Plan   Certification Period/Plan of care expiration: 8/29/24 to 11/29/24    I certify the need for these services furnished under the plan of treatment and while under my care.     ____________________________________________________________________  Physician/Referring Practitioner   Date of Signature       Desi Medellin, OT , CHT

## 2024-09-24 ENCOUNTER — OFFICE VISIT (OUTPATIENT)
Dept: ORTHOPEDICS | Facility: CLINIC | Age: 74
End: 2024-09-24
Payer: MEDICARE

## 2024-09-24 DIAGNOSIS — S52.021D CLOSED FRACTURE OF OLECRANON PROCESS OF RIGHT ULNA WITH ROUTINE HEALING, SUBSEQUENT ENCOUNTER: Primary | ICD-10-CM

## 2024-09-24 PROCEDURE — 99999 PR PBB SHADOW E&M-EST. PATIENT-LVL III: CPT | Mod: PBBFAC,HCNC,, | Performed by: ORTHOPAEDIC SURGERY

## 2024-09-24 NOTE — PROGRESS NOTES
9/24/24  Patient reports today for f/u after EMG. EMG test was negative. Patient also reports her numbness and tingling have completely resolved. She states she just drove to and from [place] this past weekend without any issues. Of note has previous history of cervical fusion that she is actively undergoing PT and seeing neurosurgery for.     6/21/24  Patient reports 18 weeks status post right olecranon ORIF.  She has been attending regular therapy and has returned back to work with some moderate soreness.  She states that she still has trouble fully extending.  She states he lacks about 15° of extension.  She does note that she is some numbness and tingling within the small finger as well as the index and long finger.  She does state that this worsens with position.  She does not state she gets nighttime numbness and tingling.  She does denies any weakness, but notes that her right side is not as strong as her left which is supported by her strength testing within therapy.    5/10/24  Patient reports 12 weeks status post right olecranon fracture ORIF.  She has been attending therapy regularly.  She states that she has some pain still localized at the triceps insertion.  She notes that she still has difficulty with full extension at the elbow.  She does note that she has been doing more things and trying to use her right arm more.    4/11/24  Patient reports 6 weeks status post right olecranon fracture ORIF.  She reports she is in minimal pain.  She has been in a long-arm fiberglass cast for the past 4 weeks.  She denies any numbness or tingling.    3/14/24  Ms. Wynne is here today for a post-operative visit.  She is 14 days status post R Olecranon fracture ORIF by Dr. Moe on 2/28/24. She reports that she is we will pain.  She states she has been taking her pain medication only as needed.  She denies any numbness or tingling.  She denies fever, chills, and sweats since the time of the surgery.     Physical  exam:    Vitals:    09/24/24 1314   PainSc:   2       Vital signs are stable, patient is afebrile.  Patient is well dressed and well groomed, no acute distress.  Alert and oriented to person, place, and time.  Cast removed and x-rays examined. Incision is clean, dry and intact.  There is no erythema or exudate.  There is no sign of any infection. She is NVI. Able to make a composite fist.  Patient has good range of motion of the elbow.  Lacks about 5° of extension.  Negative Tinel at the carpal tunnel bilaterally.   Assessment:  s/p R Olecranon fracture ORIF      Plan:  There are no diagnoses linked to this encounter.    Continue with regular therapy and home exercise program with progression of strengthening   Offered hardware removal of elbow, which patient declined at this time  F/u PRN

## 2024-09-27 NOTE — PROGRESS NOTES
Patient doing well no complaints today  Plan:  There are no diagnoses linked to this encounter.     Continue with regular therapy and home exercise program with progression of strengthening   Offered hardware removal of elbow, which patient declined at this time  F/u PRN

## 2024-10-01 ENCOUNTER — CLINICAL SUPPORT (OUTPATIENT)
Dept: CARDIOLOGY | Facility: HOSPITAL | Age: 74
End: 2024-10-01
Payer: MEDICARE

## 2024-10-01 ENCOUNTER — CLINICAL SUPPORT (OUTPATIENT)
Dept: CARDIOLOGY | Facility: HOSPITAL | Age: 74
End: 2024-10-01
Attending: INTERNAL MEDICINE
Payer: MEDICARE

## 2024-10-01 DIAGNOSIS — R00.1 BRADYCARDIA, UNSPECIFIED: ICD-10-CM

## 2024-10-01 DIAGNOSIS — Z95.0 PRESENCE OF CARDIAC PACEMAKER: ICD-10-CM

## 2024-10-01 PROCEDURE — 93294 REM INTERROG EVL PM/LDLS PM: CPT | Mod: HCNC,,, | Performed by: INTERNAL MEDICINE

## 2024-10-01 PROCEDURE — 93296 REM INTERROG EVL PM/IDS: CPT | Mod: HCNC | Performed by: INTERNAL MEDICINE

## 2024-10-07 ENCOUNTER — CLINICAL SUPPORT (OUTPATIENT)
Dept: REHABILITATION | Facility: HOSPITAL | Age: 74
End: 2024-10-07
Payer: MEDICARE

## 2024-10-07 DIAGNOSIS — M25.521 ELBOW PAIN, RIGHT: Primary | ICD-10-CM

## 2024-10-07 PROCEDURE — 97530 THERAPEUTIC ACTIVITIES: CPT | Mod: HCNC

## 2024-10-07 PROCEDURE — 97018 PARAFFIN BATH THERAPY: CPT | Mod: HCNC

## 2024-10-07 NOTE — PROGRESS NOTES
[facility]  Occupational Therapy Treatment Note and Discharge Summary     Date: 10/7/2024  Name: Manisha Wynne  Clinic Number: [id number]    Therapy Diagnosis: R olecranon Fx w/ ORIF 2/28/2024  1. Elbow pain, right            Medical Diagnosis: R Olecranon fracture with ORIF 2/28/24    ICD-10:   Z98.890 (ICD-10-CM) - Post-operative state   S52.021A (ICD-10-CM) - Closed fracture of olecranon process of right ulna, initial encounter      Physician: Sarmad Varghese PA-C; Dr. DESIREE Guevara   Physician Orders: Eval and Treat  R Elbow Olecranon ORIF  DOS: 2/28/24  12 Visits     Surgical Procedure and Date: 2/28/24,   Procedure(s) (LRB):  ORIF, ELBOW (Right)        Evaluation Date: 4/23/2024     Plan of Care Certification Period: 8/23/24     Date of Return to MD: 9/10/24     Visit # / Visits authorized: 25/ 40   Insurance Authorization Period Expiration: 12/31/24     FOTO #1: FSM 47% / GOAL 60%  FOTO #2: 5/22/24 FSM    52% / goal 60%   FOTO #3: 10/7/24 FSM 61% / goal 60% -  met      Precautions:  Standard and Weightbearing; Fall Risk      Time In: 915 am   Time Out: 950 am   Total Appointment Time (timed & untimed codes): 35 minutes    SUBJECTIVE     Pt reports: My elbow just feels stiff, but I've been out of town so maybe just stiff from travelling and sleeping in another bed. Feels better after therapy     She was compliant with home exercise program given last session.   Response to previous treatment:supination improved   Functional change: none     Pain: 4/10  Location: right elbow sore stiff      OBJECTIVE   Objective Measures updated at progress report unless specified.    Discharge status as follows;     Edema. Measured in centimeters.    4/23/2024 5/22/24 10/7/24           Proximal Wrist Crease 15.8 cm = 15.2 (-0.6)   Proximal Palmar Crease        MCPs             UE  ROM. Measured in degrees.    4/23/2024 5/15/24 6/17/2024 8/5/24 10/7/24             Wrist ext/flex  30 / 45  =/=  "43/65 38/65 (+20) 40/65   Sup/pro  70 / 70 75/90 80/90 90(+20)/90(+20) Wnl/wnl              Elbow ext / flex  52/122 25/ 130 20/145 20(+32)/148 (+26)  20/150                   Strength (Dyanmometer) and Pinch Strength (Pinch Gauge)  Measured in pounds and psi. Average of three trials.    6/17/2024 6/17/2024 8/5/24 10/7/24     Right Left Right  Right    Rung II 34.2 41.4 38 (+4) 40.2 (+2.2)   Key Pinch 8 8 = =   3pt Pinch 8.5 8 = =   2pt Pinch 6 5 = =     Treatment     Manisha received the treatments listed below:     Supervised Modalities after being cleared for contradictions: 10 min  paraffin to to R elbow and hand  x 10 min to increase blood flow, circulation and tissue elasticity prior to therex.      Therapeutic Activities to improve functional performance for 25 minutes, including:  - 2# wrist flex, ext ( in fishtail position), RD, UD x 20 reps each   - 2# elbow flex, extension palm up x 20 reps  - 1#  hammer for sup/pro x 10 reps   - Biceps stretch (in sitting): 2 lbs.  x 20"  - tricep extension 2# with gravity assisted; review progression to against gravity position as able.    - recommend to Consult Health  for further instruction of HEP at gym.   - - Red Putty: log roll with tripod pinch; gripping; extension blooms x 10 each   -   Patient Education and Home Exercises      Education provided:   - Cont HEP   - Progress towards goals     Written Home Exercises Provided: Patient instructed to cont prior HEP.  Exercises were reviewed and Manisha was able to demonstrate them prior to the end of the session.  Manisha demonstrated good  understanding of the HEP provided. See EMR under Patient Instructions for exercises provided during therapy sessions.       Assessment     Elbow strength remains limited, however ROM wfl's.   Progressing well with light strengthening.   remains slightly limited.  Patient to transition to health  and gym exercises; encourage review of UE gym exercises with health "  and progress as tolerated. Patient is IND with HEP and in agreement with discharge at this time.      Manisha is progressing well towards her goals and there are no updates to goals at this time. Pt prognosis is Good.       Anticipated barriers to occupational therapy: none     The following goals were discussed with the patient and patient is in agreement with them as to be addressed in the treatment plan.      Short Term Goals: (6 weeks):  1)  Patient to be IND with HEP and modalities for pain management- met  2)  Increase ROM 10-20 degrees to increase functional hand use for ADLs/work/leisure activities- MET  3)   Decrease edema .1-.5mm to increase joint mobility /flexibility for functional hand use. - met  4)  Assess   and pinch and set goals accordingly MET     Long Term Goals :To be met by discharge (12 weeks)   1)  Increase  strength 2-8 lbs. For driving, cooking - met, continuing   2) Increase pinch strength 1-4 psi's for opening bottles and FM coordination - not met  3)   Increase ROM 21-40 degrees to increase functional hand use for ADLs/work/leisure activities- met  4) Patient to score at _60_% OR MORE (FSM)  on FOTO to demonstrate improved perception of functional R hand  Use.- met     Plan   Will discharge at this time with HEP with all STGs met and 3 of 4 LTG's met. Patient in agreement with discharge at this time and transition to Health  and gym workouts with progression with UE strengthening as tolerated.     Desi Medellin, OT , CHT

## 2024-10-09 ENCOUNTER — ON-DEMAND VIRTUAL (OUTPATIENT)
Dept: URGENT CARE | Facility: CLINIC | Age: 74
End: 2024-10-09
Payer: MEDICARE

## 2024-10-09 DIAGNOSIS — R05.9 COUGH, UNSPECIFIED TYPE: ICD-10-CM

## 2024-10-09 DIAGNOSIS — J98.8 VIRAL RESPIRATORY ILLNESS: Primary | ICD-10-CM

## 2024-10-09 DIAGNOSIS — B97.89 VIRAL RESPIRATORY ILLNESS: Primary | ICD-10-CM

## 2024-10-09 RX ORDER — BENZONATATE 100 MG/1
100 CAPSULE ORAL 3 TIMES DAILY PRN
Qty: 30 CAPSULE | Refills: 0 | Status: SHIPPED | OUTPATIENT
Start: 2024-10-09 | End: 2024-10-19

## 2024-10-09 RX ORDER — FLUTICASONE PROPIONATE 50 MCG
1 SPRAY, SUSPENSION (ML) NASAL DAILY
Qty: 9.9 ML | Refills: 0 | Status: SHIPPED | OUTPATIENT
Start: 2024-10-09

## 2024-10-18 ENCOUNTER — PATIENT MESSAGE (OUTPATIENT)
Dept: PODIATRY | Facility: CLINIC | Age: 74
End: 2024-10-18
Payer: MEDICARE

## 2024-10-18 LAB
OHS CV AF BURDEN PERCENT: < 1
OHS CV DC REMOTE DEVICE TYPE: NORMAL
OHS CV ICD SHOCK: NO
OHS CV RV PACING PERCENT: 1 %

## 2024-10-23 DIAGNOSIS — N39.41 URGE INCONTINENCE: ICD-10-CM

## 2024-10-24 ENCOUNTER — TELEPHONE (OUTPATIENT)
Dept: PODIATRY | Facility: CLINIC | Age: 74
End: 2024-10-24
Payer: MEDICARE

## 2024-10-24 RX ORDER — VIBEGRON 75 MG/1
75 TABLET, FILM COATED ORAL DAILY
Qty: 30 TABLET | Refills: 11 | Status: SHIPPED | OUTPATIENT
Start: 2024-10-24

## 2024-10-25 ENCOUNTER — OFFICE VISIT (OUTPATIENT)
Dept: UROGYNECOLOGY | Facility: CLINIC | Age: 74
End: 2024-10-25
Payer: MEDICARE

## 2024-10-25 DIAGNOSIS — R19.8 IRREGULAR BOWEL HABITS: ICD-10-CM

## 2024-10-25 DIAGNOSIS — N95.2 VAGINAL ATROPHY: ICD-10-CM

## 2024-10-25 DIAGNOSIS — N39.46 MIXED INCONTINENCE URGE AND STRESS: Primary | ICD-10-CM

## 2024-10-25 NOTE — PATIENT INSTRUCTIONS
1)  Mixed urinary incontinence, urge > stress:    -- Empty bladder every 3 hours.  Empty well: wait a minute, lean forward on toilet.  -- Drink more water and avoid bladder irritants  -- Avoid dietary irritants (see sheet).  Keep diary x 3-5 days to determine your irritants.  --KEGELS: do 10 in AM and 10 in PM, holding each x 10 seconds.  When you feel urge to go, STOP, KEGEL, and when urge has passed, then go to bathroom.   --URGE: continue  gemtesa 75 mg  --STRESS:  Pessary vs. Sling.      2) Vaginal atrophy (dryness):   --start vaginal estrogen cream  --use dime size amount in vagina-- insert to your second knuckle and rub around just inside vaginal opening twice weekly       3) Nocturia (nighttime urination):  -- Stop fluids 2 hours before bed.   -- no water by bed  -- If have leg swelling:  Elevate feet above chest x 1 hour before bed to get excess fluid off.   -- Can also use support hose (knee highs).       4) H/o constipation/straining, now with c diff:  --continue kefir and flax seeds to your diet     5)  RTC 1 year for follow up

## 2024-10-25 NOTE — PROGRESS NOTES
Urogyn follow up  10/25/2024    [facility] - UROGYNECOLOGY  [address] [address]    Manisha Wynne  [id number]  1950      Manisha Wynne is a 74 y.o.  here for a urogyn follow up mixed urinary incontinence.    The patient location is: [place]  The chief complaint leading to consultation is: mixed urinary incontinence    Visit type: audiovisual      Each patient to whom he or she provides medical services by telemedicine is:  (1) informed of the relationship between the physician and patient and the respective role of any other health care provider with respect to management of the patient; and (2) notified that he or she may decline to receive medical services by telemedicine and may withdraw from such care at any time.    Notes:      Last HPI from 05/27/2020 ([place])     1)  UI:  (--) BRIAN (occasional) <  (+) UUI  X 2years. Usually worst in the morning. Goes to bed around 11, gets up around 1 to urinate, then has to urinate first thing in the am or she will leak when she gets out of bed. (+) pads 0- 1/day, only wears one when she travels, usually minimum wetness and 0/night usually minimum wetness.  Daytime frequency: Q 2 hours.  Nocturia: Yes: 2/night.   (--) dysuria,  (--) hematuria,  (--) frequent UTIs.  (+) incomplete bladder emptying. Will sit on toilet and wait for another urge.      2)  POP:  Absent. above introitus.  Symptoms:(--).  (--) vaginal bleeding. (--) vaginal discharge. (--) sexually active.  (--) dyspareunia.  (--)  Vaginal dryness.  (--) vaginal estrogen use.    no pop  Pvr 20 ml    3)  BM:  (--) constipation/straining.  (--) chronic diarrhea. (--) hematochezia.  (--) fecal incontinence.  (--) fecal smearing/urgency.  (--) incomplete evacuation.     11/17/2021  1)  UI:  (+) BRIAN  <  (+) UUI.   (--) pads:  Daytime frequency: Q 3 hours.  Nocturia: Yes: 1/night. Goes to bed at MD, wakes around 330-430 and 630 to urinate.   (--) dysuria,  (--)  hematuria,  (--) frequent UTIs.  (--) complete bladder emptying. Urgency in worse when she has diarrhea from C.  Diff.  --started pelvic floor PT-- did not complete due to covid. Didn't do enough to tell if helped.   --has not looked at diet for triggers     2)  POP:  Absent. above introitus.  Symptoms:(--).  (--) vaginal bleeding. (--) vaginal discharge. (--) sexually active.  (--) dyspareunia.  (--)  Vaginal dryness.  (--) vaginal estrogen use.  Concern for enlarged bartholin's gland on L side  Did not use vaginal estrogen cream due to cost.      3)  BM:  (--) constipation/straining.  (--) chronic diarrhea. (--) hematochezia.  (--) fecal incontinence.  (--) fecal smearing/urgency.  (--) incomplete evacuation.       4) C5-C6 cervical stenosis and myelopathy.   --laminectomy was planned, but cancelled due to C. Diff     5) C. Diff:  --was on antibiotics for cat scratch  --diagnosed 06/2021 prior to spinal surgery  --currently taking vancomycin; this is her 3th round              --followed by ID  --currently having normal consistency  --was having watery stool with c diff    03/21/2022  1)  Mixed urinary incontinence, urge > stress:    --bladder symptoms are worse when BM are not controlled  --[facility] PT : (p) [phone]. **Use vaginal suppositories before and/or after PT if having pain  --rare UUI--not using pads unless she has diarrhea     2) Vaginal atrophy (dryness):   --using vaginal estrogen cream     3) Nocturia (nighttime urination):  --nocturia-- denies  --only getting up at 5/6 in the AM  --does limit fluids prior to bedtime     4) H/o constipation/straining, now with c diff:  --in clinical trial   --follow up with GI and infectious disease  --eating yogurt with probiotics and kefir.    08/22/2024  1)  Mixed urinary incontinence, urge > stress:    -- +BRIAN < +UUI--worse over the past years  --voiding every 3-4 hours during the day and 1/ night--4:30/5:30--does  limit fluids prior to bedtime  --using 3-4 pads with moderate to severe wetness     2) Vaginal atrophy (dryness):   --has not been using vaginal estrogen cream      3) H/o constipation/straining, hx c diff:  --goes every other day  --rare straining took stool softener-- made it too loose    Changes since last visit  1)  Mixed urinary incontinence, urge > stress:    -- voiding every 3 hours during the day and 1/ night  --taking gemtesa 75 mg daily  --denies BRIAN, rare UUI    2) Vaginal atrophy (dryness):   --using estrogen cream    3) H/o constipation/straining, hx c diff:  --reports normal bowel movement  --continue kefir  --add flax seeds to your diet            Past Medical History:   Diagnosis Date    Abnormal Pap smear     Atrial fibrillation     AV block, 1st degree 07/25/2012    C. difficile diarrhea     RESOLVED    Cataract     Depression 07/24/2012    Facet arthritis of lumbar region 03/31/2015    Falls     3 falls in the last 6 mos--noted 6/19/19    Hyperlipidemia     Hypertension 07/24/2012    Neuropathy     Other specified cardiac dysrhythmias(427.89)     Sleep apnea     Syncope 07/24/2012    Tremors of nervous system     hands bilaterally       Past Surgical History:   Procedure Laterality Date    APPLICATION OF CARTILAGE GRAFT Bilateral 12/19/2019    Procedure: APPLICATION, CARTILAGE GRAFT AURICULAR JEZ;  Surgeon: Martinez Floers III, MD;  Location: [facility];  Service: ENT;  Laterality: Bilateral;    CARDIAC PACEMAKER PLACEMENT  09/07/2012    [id number]    CATARACT EXTRACTION W/  INTRAOCULAR LENS IMPLANT Right 5/16/2022    Procedure: EXTRACTION, CATARACT, WITH IOL INSERTION;  Surgeon: Ines Aguilera MD;  Location: [facility] OR;  Service: Ophthalmology;  Laterality: Right;  Catalys     CATARACT EXTRACTION W/  INTRAOCULAR LENS IMPLANT Left 6/20/2022    Procedure: EXTRACTION, CATARACT, WITH IOL INSERTION;  Surgeon: Ines Aguilera MD;  Location: [facility] OR;  Service: Ophthalmology;  Laterality:  Left;  Catalys     DILATION AND CURETTAGE OF UTERUS      Hx of precancerous cells?    ENDOSCOPIC ULTRASOUND OF UPPER GASTROINTESTINAL TRACT N/A 7/5/2019    Procedure: ULTRASOUND, UPPER GI TRACT, ENDOSCOPIC;  Surgeon: Kev Calderon MD;  Location: [facility] ENDO (2ND FLR);  Service: Endoscopy;  Laterality: N/A;  Pacemaker-Adam   appt confirmed-rb    MYELOGRAPHY N/A 5/4/2021    Procedure: Myelogram  CERVICAL, THORACIC AND LUMBAR;  Surgeon: [facility] Diagnostic Provider;  Location: [facility] OR 2ND FLR;  Service: Radiology;  Laterality: N/A;    NASAL SEPTOPLASTY N/A 12/19/2019    Procedure: SEPTOPLASTY, NOSE;  Surgeon: Martinez Flores III, MD;  Location: [facility] OR;  Service: ENT;  Laterality: N/A;  FOLLOW DR SALVATORE KIMBROUGH PROTOCOL    OPEN REDUCTION AND INTERNAL FIXATION (ORIF) OF FRACTURE OF DISTAL RADIUS Left 1/24/2020    Procedure: ORIF, FRACTURE, RADIUS, DISTAL-left;  Surgeon: Ellen Moe MD;  Location: [facility] OR;  Service: Orthopedics;  Laterality: Left;    OPEN REDUCTION AND INTERNAL FIXATION (ORIF) OF INJURY OF ELBOW Right 2/28/2024    Procedure: ORIF, ELBOW;  Surgeon: Ellen Moe MD;  Location: [facility] OR;  Service: Orthopedics;  Laterality: Right;    POSTERIOR FUSION OF CERVICAL SPINE WITH LAMINECTOMY N/A 11/14/2022    Procedure: LAMINECTOMY, SPINE, CERVICAL, WITH POSTERIOR FUSION C3-C6;  Surgeon: Nicolette Cheek MD;  Location: [facility] 2ND FLR;  Service: Neurosurgery;  Laterality: N/A;  TORONTO III, ASA III, BLOOD TYPE AND SCREEN, NEUROMONITORING EMG SEP MEP, BRACE/HALO [place], BED REGULAR BED, HEADREST [place], POSITION PRONE, SPECIAL EQUIPMENT NIKI [place], RADIOLOGY C-ARM, EXT. BONE STIMULATOR BIOMET    REPLACEMENT OF PACEMAKER GENERATOR Left 10/7/2022    Procedure: REPLACEMENT, PACEMAKER GENERATOR;  Surgeon: John Sheets MD;  Location: [facility] EP LAB;  Service: Cardiology;  Laterality: Left;  YAS, SSS, AVB, Dual PPM Gen Change,SJM, MAC ,FL, 3 prep,** Adam dcPPM in situ**    SURGICAL REMOVAL OF NASAL  TURBINATE Bilateral 2019    Procedure: EXCISION, NASAL TURBINATE;  Surgeon: Martinez Flores III, MD;  Location: [facility];  Service: ENT;  Laterality: Bilateral;    TONSILLECTOMY      WISDOM TOOTH EXTRACTION         Family History   Adopted: Yes   Problem Relation Name Age of Onset    Amblyopia Neg Hx      Blindness Neg Hx      Cataracts Neg Hx      Glaucoma Neg Hx      Macular degeneration Neg Hx      Retinal detachment Neg Hx         Social History     Socioeconomic History    Marital status:    Occupational History    Occupation: /Rabbi     Employer: [employer]   Tobacco Use    Smoking status: Former     Current packs/day: 0.00     Average packs/day: 0.3 packs/day for 15.0 years (3.8 ttl pk-yrs)     Types: Cigarettes     Start date: 1967     Quit date: 1982     Years since quittin.2    Smokeless tobacco: Former   Substance and Sexual Activity    Alcohol use: Yes     Comment: no daily or heavy use, ~4 times per month    Drug use: No    Sexual activity: Not Currently     Partners: Male     Social Drivers of Health     Financial Resource Strain: Low Risk  (2024)    Overall Financial Resource Strain (CARDIA)     Difficulty of Paying Living Expenses: Not hard at all   Food Insecurity: No Food Insecurity (2024)    Hunger Vital Sign     Worried About Running Out of Food in the Last Year: Never true     Ran Out of Food in the Last Year: Never true   Transportation Needs: No Transportation Needs (2024)    TRANSPORTATION NEEDS     Transportation : No   Physical Activity: Insufficiently Active (2024)    Exercise Vital Sign     Days of Exercise per Week: 2 days     Minutes of Exercise per Session: 60 min   Stress: No Stress Concern Present (2024)    Stateless [place] of Occupational [employer] - Occupational Stress Questionnaire     Feeling of Stress : Not at all   Housing Stability: Low Risk  (2024)    Housing Stability Vital Sign     Unable to Pay  for Housing in the Last Year: No     Homeless in the Last Year: No       Current Outpatient Medications   Medication Sig Dispense Refill    acetaminophen (TYLENOL) 500 MG tablet Take 2 tablets (1,000 mg total) by mouth 2 (two) times daily as needed for Pain. Begin post op day 3. 50 tablet 0    aspirin (ECOTRIN) 81 MG EC tablet Take 1 tablet (81 mg total) by mouth 2 (two) times a day. 60 tablet 0    buPROPion (WELLBUTRIN XL) 300 MG 24 hr tablet Take 1 tablet by mouth once daily.      estradioL (ESTRACE) 0.01 % (0.1 mg/gram) vaginal cream Use one gram nightly x 2 weeks then twice weekly 42.5 g 3    fluticasone propionate (FLONASE) 50 mcg/actuation nasal spray 1 spray (50 mcg total) by Each Nostril route once daily. 9.9 mL 0    lisdexamfetamine (VYVANSE) 70 MG capsule Take 70 mg by mouth every morning.      losartan (COZAAR) 25 MG tablet TAKE 1 TABLET ONE TIME DAILY 90 tablet 3    melatonin (MELATIN) 5 mg Take 1 tablet by mouth every evening. (For insomnia)      methocarbamoL (ROBAXIN) 500 MG Tab One - two daily as needed for back pain 40 tablet 0    methyl salicylate-menthol 15-10% 15-10 % Crea Apply topically every evening. 113 g 0    pravastatin (PRAVACHOL) 40 MG tablet Take 1 tablet (40 mg total) by mouth once daily. 90 tablet 1    PROLIA 60 mg/mL Syrg Inject 60 mg into the skin every 6 (six) months.      propranoloL (INDERAL) 20 MG tablet TAKE 1 TABLET TWICE DAILY 180 tablet 3    vibegron (GEMTESA) 75 mg Tab Take 1 tablet (75 mg total) by mouth Daily. 30 tablet 11     No current facility-administered medications for this visit.       Review of patient's allergies indicates:  No Known Allergies    Well woman:  Last pap: 2018 -- normal  Last mammogram:05/2024 normal  Colonoscopy: 08/2015--normal-- due 2025--GI wants to do it sooner due to c. diff  DEXA:08/2024 osteoporosis-- on prolia        ROS:  As per HPI.      Exam  LMP 10/01/2003   General: alert and oriented, no acute distress  Respiratory: normal respiratory  effort  Abd: soft, non-tender, non-distended    PELVIC EXAM: deferred      Impression  1. Mixed incontinence urge and stress        2. Vaginal atrophy        3. Irregular bowel habits            We reviewed the above issues and discussed options for short-term versus long-term management of her problems.   Plan:   1)  Mixed urinary incontinence, urge > stress:    -- Empty bladder every 3 hours.  Empty well: wait a minute, lean forward on toilet.  -- Drink more water and avoid bladder irritants  -- Avoid dietary irritants (see sheet).  Keep diary x 3-5 days to determine your irritants.  --KEGELS: do 10 in AM and 10 in PM, holding each x 10 seconds.  When you feel urge to go, STOP, KEGEL, and when urge has passed, then go to bathroom.   --URGE: continue  gemtesa 75 mg  --STRESS:  Pessary vs. Sling.      2) Vaginal atrophy (dryness):   --start vaginal estrogen cream  --use dime size amount in vagina-- insert to your second knuckle and rub around just inside vaginal opening twice weekly       3) Nocturia (nighttime urination):  -- Stop fluids 2 hours before bed.   -- no water by bed  -- If have leg swelling:  Elevate feet above chest x 1 hour before bed to get excess fluid off.   -- Can also use support hose (knee highs).       4) H/o constipation/straining, now with c diff:  --continue kefir and flax seeds to your diet     5)  RTC 1 year for follow up      Face to Face time with patient: 5  10 minutes of total time spent on the encounter, which includes face to face time and non-face to face time preparing to see the patient (eg, review of tests), Obtaining and/or reviewing separately obtained history, Documenting clinical information in the electronic or other health record, Independently interpreting results (not separately reported) and communicating results to the patient/family/caregiver, or Care coordination (not separately reported).     Jessica Messer, ANNELIESE-BC [facility]  Division of Female  Pelvic Medicine and Reconstructive Surgery  Department of Obstetrics & Gynecology

## 2024-10-28 ENCOUNTER — PATIENT MESSAGE (OUTPATIENT)
Dept: UROGYNECOLOGY | Facility: CLINIC | Age: 74
End: 2024-10-28
Payer: MEDICARE

## 2024-11-01 ENCOUNTER — HOSPITAL ENCOUNTER (OUTPATIENT)
Dept: RADIOLOGY | Facility: HOSPITAL | Age: 74
Discharge: HOME | End: 2024-11-01
Attending: INTERNAL MEDICINE
Payer: MEDICARE

## 2024-11-01 ENCOUNTER — OFFICE VISIT (OUTPATIENT)
Dept: INTERNAL MEDICINE | Facility: CLINIC | Age: 74
End: 2024-11-01
Payer: MEDICARE

## 2024-11-01 VITALS
WEIGHT: 146.25 LBS | HEART RATE: 60 BPM | BODY MASS INDEX: 22.17 KG/M2 | HEIGHT: 68 IN | SYSTOLIC BLOOD PRESSURE: 136 MMHG | OXYGEN SATURATION: 99 % | DIASTOLIC BLOOD PRESSURE: 76 MMHG

## 2024-11-01 DIAGNOSIS — U09.9 POST-COVID CHRONIC COUGH: ICD-10-CM
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
## 2024-11-01 DIAGNOSIS — R05.3 POST-COVID CHRONIC COUGH: ICD-10-CM

## 2024-11-01 DIAGNOSIS — S09.90XA HEAD TRAUMA, INITIAL ENCOUNTER: ICD-10-CM

## 2024-11-01 DIAGNOSIS — D89.89 LIGHT CHAIN DISEASE, KAPPA TYPE: ICD-10-CM

## 2024-11-01 DIAGNOSIS — S09.90XA HEAD TRAUMA, INITIAL ENCOUNTER: Primary | ICD-10-CM

## 2024-11-01 DIAGNOSIS — D64.9 ANEMIA, UNSPECIFIED TYPE: ICD-10-CM

## 2024-11-01 DIAGNOSIS — Z23 NEED FOR VACCINATION: ICD-10-CM

## 2024-11-01 DIAGNOSIS — G47.33 OSA (OBSTRUCTIVE SLEEP APNEA): ICD-10-CM

## 2024-11-01 DIAGNOSIS — D22.9 NEVUS: ICD-10-CM

## 2024-11-01 PROCEDURE — 3288F FALL RISK ASSESSMENT DOCD: CPT | Mod: HCNC,CPTII,S$GLB, | Performed by: INTERNAL MEDICINE

## 2024-11-01 PROCEDURE — 71100 X-RAY EXAM RIBS UNI 2 VIEWS: CPT | Mod: TC,PO,RT

## 2024-11-01 PROCEDURE — 3075F SYST BP GE 130 - 139MM HG: CPT | Mod: HCNC,CPTII,S$GLB, | Performed by: INTERNAL MEDICINE

## 2024-11-01 PROCEDURE — 71046 X-RAY EXAM CHEST 2 VIEWS: CPT | Mod: 26,,, | Performed by: RADIOLOGY

## 2024-11-01 PROCEDURE — 70480 CT ORBIT/EAR/FOSSA W/O DYE: CPT | Mod: TC

## 2024-11-01 PROCEDURE — 71100 X-RAY EXAM RIBS UNI 2 VIEWS: CPT | Mod: 26,RT,, | Performed by: RADIOLOGY

## 2024-11-01 PROCEDURE — 71046 X-RAY EXAM CHEST 2 VIEWS: CPT | Mod: TC,PO

## 2024-11-01 PROCEDURE — 70450 CT HEAD/BRAIN W/O DYE: CPT | Mod: TC

## 2024-11-01 PROCEDURE — 70450 CT HEAD/BRAIN W/O DYE: CPT | Mod: 26,,, | Performed by: RADIOLOGY

## 2024-11-01 PROCEDURE — 1100F PTFALLS ASSESS-DOCD GE2>/YR: CPT | Mod: HCNC,CPTII,S$GLB, | Performed by: INTERNAL MEDICINE

## 2024-11-01 PROCEDURE — 1125F AMNT PAIN NOTED PAIN PRSNT: CPT | Mod: HCNC,CPTII,S$GLB, | Performed by: INTERNAL MEDICINE

## 2024-11-01 PROCEDURE — 3078F DIAST BP <80 MM HG: CPT | Mod: HCNC,CPTII,S$GLB, | Performed by: INTERNAL MEDICINE

## 2024-11-01 PROCEDURE — 99214 OFFICE O/P EST MOD 30 MIN: CPT | Mod: HCNC,S$GLB,, | Performed by: INTERNAL MEDICINE

## 2024-11-01 PROCEDURE — 99999 PR PBB SHADOW E&M-EST. PATIENT-LVL V: CPT | Mod: PBBFAC,HCNC,, | Performed by: INTERNAL MEDICINE

## 2024-11-01 PROCEDURE — G0008 ADMIN INFLUENZA VIRUS VAC: HCPCS | Mod: HCNC,S$GLB,, | Performed by: INTERNAL MEDICINE

## 2024-11-01 PROCEDURE — 90653 IIV ADJUVANT VACCINE IM: CPT | Mod: HCNC,S$GLB,, | Performed by: INTERNAL MEDICINE

## 2024-11-01 PROCEDURE — 1157F ADVNC CARE PLAN IN RCRD: CPT | Mod: HCNC,CPTII,S$GLB, | Performed by: INTERNAL MEDICINE

## 2024-11-07 ENCOUNTER — OFFICE VISIT (OUTPATIENT)
Dept: PODIATRY | Facility: CLINIC | Age: 74
End: 2024-11-07
Payer: MEDICARE

## 2024-11-07 VITALS
HEIGHT: 68 IN | HEART RATE: 60 BPM | BODY MASS INDEX: 22.05 KG/M2 | SYSTOLIC BLOOD PRESSURE: 136 MMHG | WEIGHT: 145.5 LBS | DIASTOLIC BLOOD PRESSURE: 76 MMHG

## 2024-11-07 DIAGNOSIS — G60.8 SENSORY PERIPHERAL NEUROPATHY: ICD-10-CM

## 2024-11-07 DIAGNOSIS — L84 PRE-ULCERATIVE CALLUSES: Primary | ICD-10-CM

## 2024-11-07 DIAGNOSIS — M20.40 HAMMER TOE, UNSPECIFIED LATERALITY: ICD-10-CM

## 2024-11-07 DIAGNOSIS — M79.671 RIGHT FOOT PAIN: ICD-10-CM

## 2024-11-07 PROCEDURE — 1157F ADVNC CARE PLAN IN RCRD: CPT | Mod: HCNC,CPTII,S$GLB, | Performed by: PODIATRIST

## 2024-11-07 PROCEDURE — 1100F PTFALLS ASSESS-DOCD GE2>/YR: CPT | Mod: HCNC,CPTII,S$GLB, | Performed by: PODIATRIST

## 2024-11-07 PROCEDURE — 99214 OFFICE O/P EST MOD 30 MIN: CPT | Mod: HCNC,S$GLB,, | Performed by: PODIATRIST

## 2024-11-07 PROCEDURE — 1125F AMNT PAIN NOTED PAIN PRSNT: CPT | Mod: HCNC,CPTII,S$GLB, | Performed by: PODIATRIST

## 2024-11-07 PROCEDURE — 3288F FALL RISK ASSESSMENT DOCD: CPT | Mod: HCNC,CPTII,S$GLB, | Performed by: PODIATRIST

## 2024-11-07 PROCEDURE — 99999 PR PBB SHADOW E&M-EST. PATIENT-LVL III: CPT | Mod: PBBFAC,HCNC,, | Performed by: PODIATRIST

## 2024-11-07 PROCEDURE — 3075F SYST BP GE 130 - 139MM HG: CPT | Mod: HCNC,CPTII,S$GLB, | Performed by: PODIATRIST

## 2024-11-07 PROCEDURE — 3078F DIAST BP <80 MM HG: CPT | Mod: HCNC,CPTII,S$GLB, | Performed by: PODIATRIST

## 2024-11-07 RX ORDER — FLUOXETINE HYDROCHLORIDE 40 MG/1
40 CAPSULE ORAL
COMMUNITY
Start: 2024-10-11

## 2024-11-08 ENCOUNTER — LAB VISIT (OUTPATIENT)
Dept: LAB | Facility: HOSPITAL | Age: 74
End: 2024-11-08
Attending: INTERNAL MEDICINE
Payer: MEDICARE

## 2024-11-08 DIAGNOSIS — D64.9 ANEMIA, UNSPECIFIED TYPE: ICD-10-CM

## 2024-11-08 DIAGNOSIS — D89.89 LIGHT CHAIN DISEASE, KAPPA TYPE: ICD-10-CM

## 2024-11-08 DIAGNOSIS — E21.0 PRIMARY HYPERPARATHYROIDISM: ICD-10-CM

## 2024-11-08 DIAGNOSIS — M81.8 OTHER OSTEOPOROSIS WITHOUT CURRENT PATHOLOGICAL FRACTURE: ICD-10-CM

## 2024-11-08 DIAGNOSIS — N18.31 STAGE 3A CHRONIC KIDNEY DISEASE: ICD-10-CM

## 2024-11-08 LAB
25(OH)D3+25(OH)D2 SERPL-MCNC: 51 NG/ML (ref 30–96)
ALBUMIN SERPL BCP-MCNC: 3.7 G/DL (ref 3.5–5.2)
ALP SERPL-CCNC: 79 U/L (ref 40–150)
ALT SERPL W/O P-5'-P-CCNC: 29 U/L (ref 10–44)
ANION GAP SERPL CALC-SCNC: 8 MMOL/L (ref 8–16)
AST SERPL-CCNC: 34 U/L (ref 10–40)
BASOPHILS # BLD AUTO: 0.03 K/UL (ref 0–0.2)
BASOPHILS NFR BLD: 0.5 % (ref 0–1.9)
BILIRUB SERPL-MCNC: 0.7 MG/DL (ref 0.1–1)
BUN SERPL-MCNC: 17 MG/DL (ref 8–23)
CALCIUM SERPL-MCNC: 10.5 MG/DL (ref 8.7–10.5)
CHLORIDE SERPL-SCNC: 104 MMOL/L (ref 95–110)
CO2 SERPL-SCNC: 27 MMOL/L (ref 23–29)
CREAT SERPL-MCNC: 0.9 MG/DL (ref 0.5–1.4)
DIFFERENTIAL METHOD BLD: ABNORMAL
EOSINOPHIL # BLD AUTO: 0.2 K/UL (ref 0–0.5)
EOSINOPHIL NFR BLD: 3.9 % (ref 0–8)
ERYTHROCYTE [DISTWIDTH] IN BLOOD BY AUTOMATED COUNT: 14.1 % (ref 11.5–14.5)
EST. GFR  (NO RACE VARIABLE): >60 ML/MIN/1.73 M^2
FERRITIN SERPL-MCNC: 50 NG/ML (ref 20–300)
GLUCOSE SERPL-MCNC: 83 MG/DL (ref 70–110)
HCT VFR BLD AUTO: 33.5 % (ref 37–48.5)
HGB BLD-MCNC: 10.7 G/DL (ref 12–16)
IMM GRANULOCYTES # BLD AUTO: 0.03 K/UL (ref 0–0.04)
IMM GRANULOCYTES NFR BLD AUTO: 0.5 % (ref 0–0.5)
IRON SERPL-MCNC: 55 UG/DL (ref 30–160)
LYMPHOCYTES # BLD AUTO: 1.5 K/UL (ref 1–4.8)
LYMPHOCYTES NFR BLD: 23.9 % (ref 18–48)
MCH RBC QN AUTO: 29.9 PG (ref 27–31)
MCHC RBC AUTO-ENTMCNC: 31.9 G/DL (ref 32–36)
MCV RBC AUTO: 94 FL (ref 82–98)
MONOCYTES # BLD AUTO: 0.9 K/UL (ref 0.3–1)
MONOCYTES NFR BLD: 14.6 % (ref 4–15)
NEUTROPHILS # BLD AUTO: 3.5 K/UL (ref 1.8–7.7)
NEUTROPHILS NFR BLD: 56.6 % (ref 38–73)
NRBC BLD-RTO: 0 /100 WBC
PLATELET # BLD AUTO: 183 K/UL (ref 150–450)
PMV BLD AUTO: 10.7 FL (ref 9.2–12.9)
POTASSIUM SERPL-SCNC: 4.2 MMOL/L (ref 3.5–5.1)
PROT SERPL-MCNC: 6.8 G/DL (ref 6–8.4)
PTH-INTACT SERPL-MCNC: 45.8 PG/ML (ref 9–77)
RBC # BLD AUTO: 3.58 M/UL (ref 4–5.4)
SATURATED IRON: 15 % (ref 20–50)
SODIUM SERPL-SCNC: 139 MMOL/L (ref 136–145)
TOTAL IRON BINDING CAPACITY: 357 UG/DL (ref 250–450)
TRANSFERRIN SERPL-MCNC: 241 MG/DL (ref 200–375)
TSH SERPL DL<=0.005 MIU/L-ACNC: 2.95 UIU/ML (ref 0.4–4)
WBC # BLD AUTO: 6.11 K/UL (ref 3.9–12.7)

## 2024-11-08 PROCEDURE — 82728 ASSAY OF FERRITIN: CPT | Mod: HCNC | Performed by: INTERNAL MEDICINE

## 2024-11-08 PROCEDURE — 80053 COMPREHEN METABOLIC PANEL: CPT | Mod: HCNC | Performed by: INTERNAL MEDICINE

## 2024-11-08 PROCEDURE — 83521 IG LIGHT CHAINS FREE EACH: CPT | Mod: HCNC | Performed by: INTERNAL MEDICINE

## 2024-11-08 PROCEDURE — 84165 PROTEIN E-PHORESIS SERUM: CPT | Mod: 26,HCNC,, | Performed by: PATHOLOGY

## 2024-11-08 PROCEDURE — 84165 PROTEIN E-PHORESIS SERUM: CPT | Mod: HCNC | Performed by: INTERNAL MEDICINE

## 2024-11-08 PROCEDURE — 82306 VITAMIN D 25 HYDROXY: CPT | Mod: HCNC | Performed by: INTERNAL MEDICINE

## 2024-11-08 PROCEDURE — 84466 ASSAY OF TRANSFERRIN: CPT | Mod: HCNC | Performed by: INTERNAL MEDICINE

## 2024-11-08 PROCEDURE — 85025 COMPLETE CBC W/AUTO DIFF WBC: CPT | Mod: HCNC | Performed by: INTERNAL MEDICINE

## 2024-11-08 PROCEDURE — 36415 COLL VENOUS BLD VENIPUNCTURE: CPT | Mod: HCNC | Performed by: INTERNAL MEDICINE

## 2024-11-08 PROCEDURE — 84443 ASSAY THYROID STIM HORMONE: CPT | Mod: HCNC | Performed by: INTERNAL MEDICINE

## 2024-11-08 PROCEDURE — 83970 ASSAY OF PARATHORMONE: CPT | Mod: HCNC | Performed by: INTERNAL MEDICINE

## 2024-11-11 LAB
ALBUMIN SERPL ELPH-MCNC: 3.86 G/DL (ref 3.35–5.55)
ALPHA1 GLOB SERPL ELPH-MCNC: 0.29 G/DL (ref 0.17–0.41)
ALPHA2 GLOB SERPL ELPH-MCNC: 0.57 G/DL (ref 0.43–0.99)
B-GLOBULIN SERPL ELPH-MCNC: 0.85 G/DL (ref 0.5–1.1)
GAMMA GLOB SERPL ELPH-MCNC: 0.83 G/DL (ref 0.67–1.58)
KAPPA LC SER QL IA: 3.32 MG/DL (ref 0.33–1.94)
KAPPA LC/LAMBDA SER IA: 1.61 (ref 0.26–1.65)
LAMBDA LC SER QL IA: 2.06 MG/DL (ref 0.57–2.63)
PROT SERPL-MCNC: 6.4 G/DL (ref 6–8.4)

## 2024-11-12 LAB — PATHOLOGIST INTERPRETATION SPE: NORMAL

## 2024-11-16 ENCOUNTER — PATIENT MESSAGE (OUTPATIENT)
Dept: ENDOCRINOLOGY | Facility: CLINIC | Age: 74
End: 2024-11-16
Payer: MEDICARE

## 2024-11-18 ENCOUNTER — OFFICE VISIT (OUTPATIENT)
Dept: INTERNAL MEDICINE | Facility: CLINIC | Age: 74
End: 2024-11-18
Payer: MEDICARE

## 2024-11-18 ENCOUNTER — NURSE TRIAGE (OUTPATIENT)
Dept: ADMINISTRATIVE | Facility: CLINIC | Age: 74
End: 2024-11-18
Payer: MEDICARE

## 2024-11-18 VITALS
BODY MASS INDEX: 21.42 KG/M2 | WEIGHT: 141.31 LBS | OXYGEN SATURATION: 98 % | SYSTOLIC BLOOD PRESSURE: 128 MMHG | DIASTOLIC BLOOD PRESSURE: 80 MMHG | HEIGHT: 68 IN | HEART RATE: 77 BPM

## 2024-11-18 DIAGNOSIS — R19.7 DIARRHEA, UNSPECIFIED TYPE: Primary | ICD-10-CM

## 2024-11-18 PROCEDURE — 1159F MED LIST DOCD IN RCRD: CPT | Mod: HCNC,CPTII,S$GLB,

## 2024-11-18 PROCEDURE — 1125F AMNT PAIN NOTED PAIN PRSNT: CPT | Mod: HCNC,CPTII,S$GLB,

## 2024-11-18 PROCEDURE — 3074F SYST BP LT 130 MM HG: CPT | Mod: HCNC,CPTII,S$GLB,

## 2024-11-18 PROCEDURE — 1100F PTFALLS ASSESS-DOCD GE2>/YR: CPT | Mod: HCNC,CPTII,S$GLB,

## 2024-11-18 PROCEDURE — 1160F RVW MEDS BY RX/DR IN RCRD: CPT | Mod: HCNC,CPTII,S$GLB,

## 2024-11-18 PROCEDURE — 3079F DIAST BP 80-89 MM HG: CPT | Mod: HCNC,CPTII,S$GLB,

## 2024-11-18 PROCEDURE — 99999 PR PBB SHADOW E&M-EST. PATIENT-LVL III: CPT | Mod: PBBFAC,HCNC,,

## 2024-11-18 PROCEDURE — 99213 OFFICE O/P EST LOW 20 MIN: CPT | Mod: HCNC,S$GLB,,

## 2024-11-18 PROCEDURE — 1157F ADVNC CARE PLAN IN RCRD: CPT | Mod: HCNC,CPTII,S$GLB,

## 2024-11-18 PROCEDURE — 3288F FALL RISK ASSESSMENT DOCD: CPT | Mod: HCNC,CPTII,S$GLB,

## 2024-11-18 NOTE — PROGRESS NOTES
"    CHIEF COMPLAINT     Chief Complaint   Patient presents with    Diarrhea     Started 11/16/24        HPI     Manisha Wynne is a 74 y.o. female who presents for urgent care visit for diarrhea today.    PCP is Iris Blue MD, patient is new to me. Pt consents to AI recording of visit for documentation purposes.     History of Present Illness    CHIEF COMPLAINT:  Patient presents today for evaluation of diarrhea and GI symptoms.    HISTORY OF C. 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 She subsequently participated in a clinical trial for C. diff treatment, which entails fecal transplantation through fecal enema. Since participating in this trial patient states that she has not had an C. Diff episode since. She reports experiencing gas, loose stools, abdominal discomfort, and nausea. She describes "little floaty things" in her stool and possible watery stool. She expresses feeling like her "insides are just coming out," which she associates with her previous C. diff episodes. She reports experiencing a fever a few days ago, which has since resolved. Patient has been trying to stay well hydrated as well.              Home Medications:  Prior to Admission medications    Medication Sig Start Date End Date Taking? Authorizing Provider   acetaminophen (TYLENOL) 500 MG tablet Take 2 tablets (1,000 mg total) by mouth 2 (two) times daily as needed for Pain. Begin post op day 3. 2/27/24  Yes Sarmad Varghese PA-C   aspirin (ECOTRIN) 81 MG EC tablet Take 1 tablet (81 mg total) by mouth 2 (two) times a day. 2/27/24 11/18/24 Yes Sarmad Varghese PA-C   buPROPion (WELLBUTRIN XL) 300 MG 24 hr tablet Take 1 tablet by mouth once daily. 12/15/22  Yes Provider, Historical   estradioL (ESTRACE) 0.01 % (0.1 mg/gram) vaginal cream Use one gram nightly x 2 weeks then twice weekly 8/22/24  Yes Jessica Messer NP   FLUoxetine 40 MG capsule Take 40 mg by mouth. " "10/11/24  Yes Provider, Historical   fluticasone propionate (FLONASE) 50 mcg/actuation nasal spray 1 spray (50 mcg total) by Each Nostril route once daily. 10/9/24  Yes Louise Canela NP   lisdexamfetamine (VYVANSE) 70 MG capsule Take 70 mg by mouth every morning.   Yes Provider, Historical   losartan (COZAAR) 25 MG tablet TAKE 1 TABLET ONE TIME DAILY 6/3/24  Yes Natali Comer MD   melatonin (MELATIN) 5 mg Take 1 tablet by mouth every evening. (For insomnia)   Yes Provider, Historical   methocarbamoL (ROBAXIN) 500 MG Tab One - two daily as needed for back pain 5/3/24  Yes Iris Blue MD   methyl salicylate-menthol 15-10% 15-10 % Crea Apply topically every evening. 12/13/22  Yes Angely Dillon NP   pravastatin (PRAVACHOL) 40 MG tablet Take 1 tablet (40 mg total) by mouth once daily. 4/16/24  Yes Iris Blue MD   PROLIA 60 mg/mL Syrg Inject 60 mg into the skin every 6 (six) months. 2/23/23  Yes Provider, Historical   propranoloL (INDERAL) 20 MG tablet TAKE 1 TABLET TWICE DAILY 4/25/24  Yes Natali Comer MD   vibegron (GEMTESA) 75 mg Tab Take 1 tablet (75 mg total) by mouth Daily. 10/24/24  Yes Jessica Messer NP         Health Maintainence:   Immunizations:  Health Maintenance         Date Due Completion Date    COVID-19 Vaccine (10 - 2024-25 season) 09/01/2024 11/9/2023    Lipid Panel 03/08/2025 3/8/2024    DEXA Scan 08/02/2026 8/2/2024    TETANUS VACCINE 06/04/2031 6/4/2021             PHYSICAL EXAM     /80 (BP Location: Right arm, Patient Position: Sitting)   Pulse 77   Ht 5' 8" (1.727 m)   Wt 64.1 kg (141 lb 5 oz)   LMP 10/01/2003   SpO2 98%   BMI 21.49 kg/m²     Physical Exam  Constitutional:       General: She is not in acute distress.     Appearance: Normal appearance. She is not toxic-appearing.   HENT:      Head: Normocephalic and atraumatic.      Right Ear: External ear normal.      Left Ear: External ear normal.      Nose: Nose normal.      " Mouth/Throat:      Mouth: Mucous membranes are moist.   Eyes:      Extraocular Movements: Extraocular movements intact.   Cardiovascular:      Rate and Rhythm: Normal rate and regular rhythm.      Pulses: Normal pulses.      Heart sounds: Normal heart sounds.   Pulmonary:      Effort: Pulmonary effort is normal. No respiratory distress.   Abdominal:      General: Abdomen is flat.      Palpations: Abdomen is soft.      Tenderness: There is no abdominal tenderness.   Musculoskeletal:      Cervical back: Normal range of motion and neck supple.   Skin:     General: Skin is warm.      Findings: No bruising or erythema.   Neurological:      General: No focal deficit present.      Mental Status: She is alert.   Psychiatric:         Mood and Affect: Mood normal.         LABS     Lab Results   Component Value Date    HGBA1C 5.5 11/16/2023     CMP  Sodium   Date Value Ref Range Status   11/08/2024 139 136 - 145 mmol/L Final     Potassium   Date Value Ref Range Status   11/08/2024 4.2 3.5 - 5.1 mmol/L Final     Chloride   Date Value Ref Range Status   11/08/2024 104 95 - 110 mmol/L Final     CO2   Date Value Ref Range Status   11/08/2024 27 23 - 29 mmol/L Final     Glucose   Date Value Ref Range Status   11/08/2024 83 70 - 110 mg/dL Final     BUN   Date Value Ref Range Status   11/08/2024 17 8 - 23 mg/dL Final     Creatinine   Date Value Ref Range Status   11/08/2024 0.9 0.5 - 1.4 mg/dL Final     Calcium   Date Value Ref Range Status   11/08/2024 10.5 8.7 - 10.5 mg/dL Final     Total Protein   Date Value Ref Range Status   11/08/2024 6.8 6.0 - 8.4 g/dL Final     Albumin   Date Value Ref Range Status   11/08/2024 3.7 3.5 - 5.2 g/dL Final     Total Bilirubin   Date Value Ref Range Status   11/08/2024 0.7 0.1 - 1.0 mg/dL Final     Comment:     For infants and newborns, interpretation of results should be based  on gestational age, weight and in agreement with clinical  observations.    Premature Infant recommended reference  ranges:  Up to 24 hours.............<8.0 mg/dL  Up to 48 hours............<12.0 mg/dL  3-5 days..................<15.0 mg/dL  6-29 days.................<15.0 mg/dL       Alkaline Phosphatase   Date Value Ref Range Status   11/08/2024 79 40 - 150 U/L Final     AST   Date Value Ref Range Status   11/08/2024 34 10 - 40 U/L Final     ALT   Date Value Ref Range Status   11/08/2024 29 10 - 44 U/L Final     Anion Gap   Date Value Ref Range Status   11/08/2024 8 8 - 16 mmol/L Final     eGFR if    Date Value Ref Range Status   01/31/2022 58 (A) >60 mL/min/1.73 m^2 Final     eGFR if non    Date Value Ref Range Status   01/31/2022 50 (A) >60 mL/min/1.73 m^2 Final     Comment:     Calculation used to obtain the estimated glomerular filtration  rate (eGFR) is the CKD-EPI equation.        Lab Results   Component Value Date    WBC 6.11 11/08/2024    HGB 10.7 (L) 11/08/2024    HCT 33.5 (L) 11/08/2024    MCV 94 11/08/2024     11/08/2024     Lab Results   Component Value Date    CHOL 151 03/08/2024    CHOL 177 01/23/2024    CHOL 216 (H) 11/16/2023     Lab Results   Component Value Date    HDL 56 03/08/2024    HDL 75 01/23/2024    HDL 77 (H) 11/16/2023     Lab Results   Component Value Date    LDLCALC 80.6 03/08/2024    LDLCALC 87.0 01/23/2024    LDLCALC 123.0 11/16/2023     Lab Results   Component Value Date    TRIG 72 03/08/2024    TRIG 75 01/23/2024    TRIG 80 11/16/2023     Lab Results   Component Value Date    CHOLHDL 37.1 03/08/2024    CHOLHDL 42.4 01/23/2024    CHOLHDL 35.6 11/16/2023     Lab Results   Component Value Date    TSH 2.950 11/08/2024       ASSESSMENT/PLAN   Assessment & Plan    Suspect possible recurrence of C. difficile infection given patient's history of 4 previous episodes and current symptoms  Monitoring hydration status closely given patient's age and diarrheal symptoms    PLAN SUMMARY:  Ordered comprehensive stool studies (C. difficile test, stool culture, labs)  Follow  up to discuss test results and further treatment if necessary  Continue drinking Body Armor or similar electrolyte-rich fluids for hydration  Wear protective undergarments as needed for symptom management  ER precautions given if greatly increased diarrhea episodes or worsening symptoms    FOLLOW-UP:  Follow up to discuss test results and further treatment if necessary.          Manisha was seen today for diarrhea.    Diagnoses and all orders for this visit:    Diarrhea, unspecified type  -     H. pylori antigen, stool; Future  -     Occult blood x 1, stool; Future  -     WBC, Stool; Future  -     Rotavirus antigen, stool; Future  -     Adenovirus Molecular Detection, PCR, Non-Blood Stool; Future  -     Calprotectin, Stool; Future  -     Giardia / Cryptosporidum, EIA; Future  -     Stool Exam-Ova,Cysts,Parasites; Future  -     Clostridium difficile EIA; Future  -     Stool culture; Future          Kev Byrne MD   Department of Internal Medicine - [facility]  1:36 PM

## 2024-11-18 NOTE — TELEPHONE ENCOUNTER
Pt with complaints of diarrhea for 3 days.  Pt had temp of 101.0 two days ago, no fever currently.  Pt with 6 episodes over last 24 hours.  Pt is not currently on any antibiotics.  States diarrhea came on all of a sudden after leaving a gathering this past weekend.  States she ate minimal at the party.  Care advice states to be seen in the office today.  Appointment made at the [facility] location today at 1:40pm as Dr. Blue/pts pcp had no availability.  Patient verbally understands, all questions answered, advised to call back for any worsening symptoms or further needs.     Reason for Disposition   SEVERE diarrhea (e.g., 7 or more times / day more than normal) and present > 24 hours (1 day)    Additional Information   Negative: Shock suspected (e.g., cold/pale/clammy skin, too weak to stand, low BP, rapid pulse)   Negative: Difficult to awaken or acting confused (e.g., disoriented, slurred speech)   Negative: Sounds like a life-threatening emergency to the triager   Negative: SEVERE abdominal pain (e.g., excruciating) and present > 1 hour   Negative: SEVERE abdominal pain and age > 60 years   Negative: Bloody, black, or tarry bowel movements  (Exception: Chronic-unchanged black-grey bowel movements and is taking iron pills or Pepto-Bismol.)   Negative: SEVERE diarrhea (e.g., 7 or more times / day more than normal) and age > 60 years   Negative: Constant abdominal pain lasting > 2 hours   Negative: Drinking very little and dehydration suspected (e.g., no urine > 12 hours, very dry mouth, very lightheaded)   Negative: Patient sounds very sick or weak to the triager    Protocols used: Diarrhea-A-OH

## 2024-11-19 ENCOUNTER — LAB VISIT (OUTPATIENT)
Dept: LAB | Facility: OTHER | Age: 74
End: 2024-11-19
Payer: MEDICARE

## 2024-11-19 DIAGNOSIS — R19.7 DIARRHEA, UNSPECIFIED TYPE: ICD-10-CM

## 2024-11-19 PROCEDURE — 89055 LEUKOCYTE ASSESSMENT FECAL: CPT | Mod: HCNC

## 2024-11-19 PROCEDURE — 87427 SHIGA-LIKE TOXIN AG IA: CPT | Mod: HCNC

## 2024-11-19 PROCEDURE — 87425 ROTAVIRUS AG IA: CPT | Mod: HCNC

## 2024-11-19 PROCEDURE — 87338 HPYLORI STOOL AG IA: CPT | Mod: HCNC

## 2024-11-19 PROCEDURE — 82272 OCCULT BLD FECES 1-3 TESTS: CPT | Mod: HCNC

## 2024-11-19 PROCEDURE — 87329 GIARDIA AG IA: CPT | Mod: HCNC

## 2024-11-19 PROCEDURE — 87798 DETECT AGENT NOS DNA AMP: CPT | Mod: HCNC

## 2024-11-19 PROCEDURE — 87045 FECES CULTURE AEROBIC BACT: CPT | Mod: HCNC

## 2024-11-19 PROCEDURE — 87177 OVA AND PARASITES SMEARS: CPT | Mod: HCNC

## 2024-11-19 PROCEDURE — 87449 NOS EACH ORGANISM AG IA: CPT | Mod: 91,HCNC

## 2024-11-19 PROCEDURE — 87046 STOOL CULTR AEROBIC BACT EA: CPT | Mod: HCNC

## 2024-11-19 PROCEDURE — 83993 ASSAY FOR CALPROTECTIN FECAL: CPT | Mod: HCNC

## 2024-11-19 PROCEDURE — 87449 NOS EACH ORGANISM AG IA: CPT | Mod: HCNC

## 2024-11-19 NOTE — PROGRESS NOTES
Subjective:      Patient ID: Manisha Wynne is a 74 y.o. female.    Chief Complaint:   Toe Pain (Bilateral 2nd digit )    Manisha is a 74 y.o. female who presents to the clinic for evaluation and treatment of feet  Manisha has a past medical history of Abnormal Pap smear, Atrial fibrillation, AV block, 1st degree (07/25/2012), C. difficile diarrhea, Cataract, Depression (07/24/2012), Facet arthritis of lumbar region (03/31/2015), Falls, Hyperlipidemia, Hypertension (07/24/2012), Neuropathy, Other specified cardiac dysrhythmias(427.89), Sleep apnea, Syncope (07/24/2012), and Tremors of nervous system.  .  This patient has documented high risk feet requiring routine maintenance secondary to peripheral neuropathy.    Patient returns to clinic for foot evaluation/c/o pain  Patient discusses had another significant fall  Did not injure her feet  She would like to pursue the tenotomy once she finds out why she is falling      PCP: Iris Blue MD    Date Last Seen by PCP 5/3/24    Current shoe gear:  Affected Foot : bacilio Snyder        Hemoglobin A1C   Date Value Ref Range Status   11/16/2023 5.5 4.0 - 5.6 % Final     Comment:     ADA Screening Guidelines:  5.7-6.4%  Consistent with prediabetes  >or=6.5%  Consistent with diabetes    High levels of fetal hemoglobin interfere with the HbA1C  assay. Heterozygous hemoglobin variants (HbS, HgC, etc)do  not significantly interfere with this assay.   However, presence of multiple variants may affect accuracy.     08/05/2022 5.3 4.0 - 5.6 % Final     Comment:     ADA Screening Guidelines:  5.7-6.4%  Consistent with prediabetes  >or=6.5%  Consistent with diabetes    High levels of fetal hemoglobin interfere with the HbA1C  assay. Heterozygous hemoglobin variants (HbS, HgC, etc)do  not significantly interfere with this assay.   However, presence of multiple variants may affect accuracy.     06/10/2021 5.4 4.0 - 5.6 % Final     Comment:     ADA Screening  Guidelines:  5.7-6.4%  Consistent with prediabetes  >or=6.5%  Consistent with diabetes    High levels of fetal hemoglobin interfere with the HbA1C  assay. Heterozygous hemoglobin variants (HbS, HgC, etc)do  not significantly interfere with this assay.   However, presence of multiple variants may affect accuracy.     06/10/2021 5.4 4.0 - 5.6 % Final     Comment:     ADA Screening Guidelines:  5.7-6.4%  Consistent with prediabetes  >or=6.5%  Consistent with diabetes    High levels of fetal hemoglobin interfere with the HbA1C  assay. Heterozygous hemoglobin variants (HbS, HgC, etc)do  not significantly interfere with this assay.   However, presence of multiple variants may affect accuracy.          Past Medical History:   Diagnosis Date    Abnormal Pap smear     Atrial fibrillation     AV block, 1st degree 07/25/2012    C. difficile diarrhea     RESOLVED    Cataract     Depression 07/24/2012    Facet arthritis of lumbar region 03/31/2015    Falls     3 falls in the last 6 mos--noted 6/19/19    Hyperlipidemia     Hypertension 07/24/2012    Neuropathy     Other specified cardiac dysrhythmias(427.89)     Sleep apnea     Syncope 07/24/2012    Tremors of nervous system     hands bilaterally     Past Surgical History:   Procedure Laterality Date    APPLICATION OF CARTILAGE GRAFT Bilateral 12/19/2019    Procedure: APPLICATION, CARTILAGE GRAFT AURICULAR JEZ;  Surgeon: Martinez Flores III, MD;  Location: [facility];  Service: ENT;  Laterality: Bilateral;    CARDIAC PACEMAKER PLACEMENT  09/07/2012    [id number]    CATARACT EXTRACTION W/  INTRAOCULAR LENS IMPLANT Right 5/16/2022    Procedure: EXTRACTION, CATARACT, WITH IOL INSERTION;  Surgeon: Ines Aguilera MD;  Location: [facility];  Service: Ophthalmology;  Laterality: Right;  Catalys     CATARACT EXTRACTION W/  INTRAOCULAR LENS IMPLANT Left 6/20/2022    Procedure: EXTRACTION, CATARACT, WITH IOL INSERTION;  Surgeon: Ines Aguilera MD;  Location: [facility];   Service: Ophthalmology;  Laterality: Left;  Catalys     DILATION AND CURETTAGE OF UTERUS      Hx of precancerous cells?    ENDOSCOPIC ULTRASOUND OF UPPER GASTROINTESTINAL TRACT N/A 7/5/2019    Procedure: ULTRASOUND, UPPER GI TRACT, ENDOSCOPIC;  Surgeon: Kev Calderon MD;  Location: [facility] (2ND FLR);  Service: Endoscopy;  Laterality: N/A;  Pacemaker-Adam   appt confirmed-rb    MYELOGRAPHY N/A 5/4/2021    Procedure: Myelogram  CERVICAL, THORACIC AND LUMBAR;  Surgeon: [facility] Diagnostic Provider;  Location: [facility] OR 2ND FLR;  Service: Radiology;  Laterality: N/A;    NASAL SEPTOPLASTY N/A 12/19/2019    Procedure: SEPTOPLASTY, NOSE;  Surgeon: Martinez Flores III, MD;  Location: [facility] OR;  Service: ENT;  Laterality: N/A;  FOLLOW DR SALVATORE KIMBROUGH PROTOCOL    OPEN REDUCTION AND INTERNAL FIXATION (ORIF) OF FRACTURE OF DISTAL RADIUS Left 1/24/2020    Procedure: ORIF, FRACTURE, RADIUS, DISTAL-left;  Surgeon: Ellen Moe MD;  Location: [facility] OR;  Service: Orthopedics;  Laterality: Left;    OPEN REDUCTION AND INTERNAL FIXATION (ORIF) OF INJURY OF ELBOW Right 2/28/2024    Procedure: ORIF, ELBOW;  Surgeon: Ellen Moe MD;  Location: [facility] OR;  Service: Orthopedics;  Laterality: Right;    POSTERIOR FUSION OF CERVICAL SPINE WITH LAMINECTOMY N/A 11/14/2022    Procedure: LAMINECTOMY, SPINE, CERVICAL, WITH POSTERIOR FUSION C3-C6;  Surgeon: Nicolette Cheek MD;  Location: [facility] 2ND FLR;  Service: Neurosurgery;  Laterality: N/A;  TORONTO III, ASA III, BLOOD TYPE AND SCREEN, NEUROMONITORING EMG SEP MEP, BRACE/HALO [place], BED REGULAR BED, HEADREST [place], POSITION PRONE, SPECIAL EQUIPMENT NIKI [place], RADIOLOGY C-ARM, EXT. BONE STIMULATOR BIOMET    REPLACEMENT OF PACEMAKER GENERATOR Left 10/7/2022    Procedure: REPLACEMENT, PACEMAKER GENERATOR;  Surgeon: John Sheets MD;  Location: [facility] EP LAB;  Service: Cardiology;  Laterality: Left;  YAS, SSS, AVB, Dual PPM Gen Change,SJM, MAC ,DE, 3 prep,** Adam dcPPM  in situ**    SURGICAL REMOVAL OF NASAL TURBINATE Bilateral 12/19/2019    Procedure: EXCISION, NASAL TURBINATE;  Surgeon: Martinez Flores III, MD;  Location: [facility];  Service: ENT;  Laterality: Bilateral;    TONSILLECTOMY      WISDOM TOOTH EXTRACTION       Current Outpatient Medications on File Prior to Visit   Medication Sig Dispense Refill    acetaminophen (TYLENOL) 500 MG tablet Take 2 tablets (1,000 mg total) by mouth 2 (two) times daily as needed for Pain. Begin post op day 3. 50 tablet 0    buPROPion (WELLBUTRIN XL) 300 MG 24 hr tablet Take 1 tablet by mouth once daily.      estradioL (ESTRACE) 0.01 % (0.1 mg/gram) vaginal cream Use one gram nightly x 2 weeks then twice weekly 42.5 g 3    FLUoxetine 40 MG capsule Take 40 mg by mouth.      fluticasone propionate (FLONASE) 50 mcg/actuation nasal spray 1 spray (50 mcg total) by Each Nostril route once daily. 9.9 mL 0    lisdexamfetamine (VYVANSE) 70 MG capsule Take 70 mg by mouth every morning.      losartan (COZAAR) 25 MG tablet TAKE 1 TABLET ONE TIME DAILY 90 tablet 3    melatonin (MELATIN) 5 mg Take 1 tablet by mouth every evening. (For insomnia)      methocarbamoL (ROBAXIN) 500 MG Tab One - two daily as needed for back pain 40 tablet 0    methyl salicylate-menthol 15-10% 15-10 % Crea Apply topically every evening. 113 g 0    pravastatin (PRAVACHOL) 40 MG tablet Take 1 tablet (40 mg total) by mouth once daily. 90 tablet 1    PROLIA 60 mg/mL Syrg Inject 60 mg into the skin every 6 (six) months.      propranoloL (INDERAL) 20 MG tablet TAKE 1 TABLET TWICE DAILY 180 tablet 3    vibegron (GEMTESA) 75 mg Tab Take 1 tablet (75 mg total) by mouth Daily. 30 tablet 11    aspirin (ECOTRIN) 81 MG EC tablet Take 1 tablet (81 mg total) by mouth 2 (two) times a day. 60 tablet 0     No current facility-administered medications on file prior to visit.     Review of patient's allergies indicates:  No Known Allergies    Review of Systems   Constitutional: Negative for chills,  "decreased appetite, fever, malaise/fatigue, night sweats, weight gain and weight loss.   Eyes:         Poor eyesight   Cardiovascular:  Negative for chest pain, claudication, dyspnea on exertion, leg swelling, palpitations and syncope.   Respiratory:  Negative for cough and shortness of breath.    Endocrine: Negative for cold intolerance and heat intolerance.   Hematologic/Lymphatic: Negative for bleeding problem. Does not bruise/bleed easily.   Skin:  Positive for nail changes. Negative for color change, dry skin, flushing, itching, poor wound healing, rash, skin cancer, suspicious lesions and unusual hair distribution.   Musculoskeletal:  Positive for arthritis, falls and stiffness. Negative for back pain, gout, joint pain, joint swelling, muscle cramps, muscle weakness, myalgias and neck pain.   Gastrointestinal:  Negative for diarrhea, nausea and vomiting.   Neurological:  Positive for numbness and paresthesias. Negative for dizziness, focal weakness, light-headedness, tremors, vertigo and weakness.   Psychiatric/Behavioral:  Negative for altered mental status and depression. The patient does not have insomnia.    Allergic/Immunologic: Negative.            Objective:       Vitals:    11/07/24 1648   BP: 136/76   Pulse: 60   Weight: 66 kg (145 lb 8.1 oz)   Height: 5' 8" (1.727 m)   PainSc:   6   PainLoc: Toe   66 kg (145 lb 8.1 oz) afeb    Physical Exam  Vitals reviewed.   Constitutional:       General: She is not in acute distress.     Appearance: She is well-developed. She is not ill-appearing, toxic-appearing or diaphoretic.      Comments: Proper supportive shoegear   Cardiovascular:      Pulses:           Dorsalis pedis pulses are 2+ on the right side and 2+ on the left side.        Posterior tibial pulses are 1+ on the right side and 1+ on the left side.      Comments: No edema to digits  Musculoskeletal:         General: Deformity present.      Right lower leg: No edema.      Left lower leg: No edema.      " Right foot: Decreased range of motion. Deformity and prominent metatarsal heads present. No tenderness or bony tenderness.      Left foot: Decreased range of motion. Deformity and prominent metatarsal heads present. No tenderness or bony tenderness.      Comments:    Semi- Reducible extensor and flexor contractures at the MTPJ and PIPJ of toes 2-5, bilat.       No pop      Feet:      Right foot:      Protective Sensation: 10 sites tested.  0 sites sensed.      Skin integrity: No ulcer, blister, skin breakdown, erythema, warmth, callus or dry skin.      Toenail Condition: Right toenails are abnormally thick.      Left foot:      Protective Sensation: 10 sites tested.  0 sites sensed.      Skin integrity: Skin breakdown present. No blister, erythema, warmth, callus or dry skin.      Toenail Condition: Left toenails are abnormally thick.      Comments:  No soi      hpk right, 2nd and 3rd toe subungal distal   Pre-ulcer          Skin:     General: Skin is warm and dry.      Capillary Refill: Capillary refill takes 2 to 3 seconds.      Coloration: Skin is not pale.      Findings: No erythema or rash.      Nails: There is clubbing.      Comments:          Neurological:      Mental Status: She is alert and oriented to person, place, and time.      Sensory: Sensory deficit present.      Motor: Atrophy present.      Gait: Gait abnormal.   Psychiatric:         Attention and Perception: Attention normal.         Mood and Affect: Mood normal.         Speech: Speech normal.         Thought Content: Thought content normal.         Cognition and Memory: Cognition normal.         Judgment: Judgment normal.                    Assessment:       Encounter Diagnoses   Name Primary?    Pre-ulcerative calluses Yes    Sensory peripheral neuropathy     Hammer toe, unspecified laterality     Right foot pain                            Plan:       Manisha was seen today for toe pain.    Diagnoses and all orders for this  visit:    Pre-ulcerative calluses    Sensory peripheral neuropathy    Hammer toe, unspecified laterality    Right foot pain          I counseled the patient on her conditions, their implications and medical management.    Conservative and surgical options discussed in detail for hammertoe deformity.    Discussed options for peripheral neuropathy/nerve entrapment syndrome including nerve block therapy, surgical nerve entrapment decompression procedures, and various vitamins and supplementation available shown to improve nerve function.

## 2024-11-20 ENCOUNTER — PATIENT MESSAGE (OUTPATIENT)
Dept: INFECTIOUS DISEASES | Facility: CLINIC | Age: 74
End: 2024-11-20
Payer: MEDICARE

## 2024-11-20 LAB
C DIFF GDH STL QL: POSITIVE
C DIFF TOX A+B STL QL IA: POSITIVE
CRYPTOSP AG STL QL IA: NEGATIVE
G LAMBLIA AG STL QL IA: NEGATIVE
OB PNL STL: NEGATIVE
RV AG STL QL IA.RAPID: POSITIVE
WBC #/AREA STL HPF: NORMAL /[HPF]

## 2024-11-21 ENCOUNTER — PATIENT MESSAGE (OUTPATIENT)
Dept: INTERNAL MEDICINE | Facility: CLINIC | Age: 74
End: 2024-11-21
Payer: MEDICARE

## 2024-11-21 DIAGNOSIS — A04.71 RECURRENT CLOSTRIDIOIDES DIFFICILE DIARRHEA: Primary | ICD-10-CM

## 2024-11-21 LAB
E COLI SXT1 STL QL IA: NEGATIVE
E COLI SXT2 STL QL IA: NEGATIVE

## 2024-11-21 RX ORDER — VANCOMYCIN HYDROCHLORIDE 125 MG/1
CAPSULE ORAL
Qty: 82 CAPSULE | Refills: 0 | Status: SHIPPED | OUTPATIENT
Start: 2024-11-21 | End: 2025-01-03

## 2024-11-21 RX ORDER — PRAVASTATIN SODIUM 40 MG/1
40 TABLET ORAL
Qty: 90 TABLET | Refills: 1 | Status: SHIPPED | OUTPATIENT
Start: 2024-11-21

## 2024-11-21 NOTE — TELEPHONE ENCOUNTER
Patient found to be c. Diff and rotavirus positive. Called patient and discussed results. Based on prior treatment approaches for patient pulsed-tapered regimen was initiated. Will send for tapered course of vancomycin.    Kev Byrne MD  [facility]

## 2024-11-21 NOTE — TELEPHONE ENCOUNTER
No care due was identified.  Health [facility] Embedded Care Due Messages. Reference number: [id number].   11/21/2024 2:10:02 PM CST

## 2024-11-22 ENCOUNTER — PATIENT MESSAGE (OUTPATIENT)
Dept: PODIATRY | Facility: CLINIC | Age: 74
End: 2024-11-22
Payer: MEDICARE

## 2024-11-22 ENCOUNTER — NURSE TRIAGE (OUTPATIENT)
Dept: ADMINISTRATIVE | Facility: CLINIC | Age: 74
End: 2024-11-22
Payer: MEDICARE

## 2024-11-22 LAB
BACTERIA STL CULT: NORMAL
CALPROTECTIN STL-MCNT: 324 MCG/G
H PYLORI AG STL QL IA: NOT DETECTED
HADV DNA SERPL QL NAA+PROBE: NEGATIVE
SPECIMEN SOURCE: NORMAL

## 2024-11-22 RX ORDER — ONDANSETRON 4 MG/1
4 TABLET, ORALLY DISINTEGRATING ORAL EVERY 8 HOURS PRN
Qty: 15 TABLET | Refills: 0 | Status: SHIPPED | OUTPATIENT
Start: 2024-11-22

## 2024-11-22 NOTE — TELEPHONE ENCOUNTER
Patient calling stating she got an appointment to see the dr. She says she has c.diff. She says the dr called her and they spoke about her starting zofran. She is on antibiotics and says she has not been able to eat. Spoke to on call provider, Dr. Cronin. She says ok to order Zofran 4 mg take 1 every 8-12 hrs prn nausea disp 15. Prescription called in to [facility] on [address] per patient request. Patient advised to call and make sure it is ready before going to pick it up. No other needs voiced at this time. Please contact caller directly to discuss any further care advice.    Reason for Disposition   Taking prescription medication that could cause nausea (e.g., narcotics/opiates, antibiotics, OCPs, many others)    Additional Information   Negative: Shock suspected (e.g., cold/pale/clammy skin, too weak to stand, low BP, rapid pulse)   Negative: Sounds like a life-threatening emergency to the triager   Negative: Unable to walk, or can only walk with assistance (e.g., requires support)   Negative: Difficulty breathing   Negative: [1] Insulin-dependent diabetes (Type I) AND [2] glucose > 400 mg/dL (22 mmol/L)   Negative: [1] Drinking very little AND [2] dehydration suspected (e.g., no urine > 12 hours, very dry mouth, very lightheaded)   Negative: Patient sounds very sick or weak to the triager   Negative: Fever > 104 F (40 C)   Negative: [1] Fever > 101 F (38.3 C) AND [2] age > 60 years   Negative: [1] Fever > 100 F (37.8 C) AND [2] bedridden (e.g., CVA, chronic illness, recovering from surgery)   Negative: [1] Fever > 100 F (37.8 C) AND [2] diabetes mellitus or weak immune system (e.g., HIV positive, cancer chemo, splenectomy, organ transplant, chronic steroids)   Negative: Taking any of the following medications: digoxin (Lanoxin), lithium, theophylline, phenytoin (Dilantin)   Negative: Yellowish color of the skin or white of the eye (i.e., jaundice)   Negative: Fever present > 3 days (72 hours)   Negative:  Receiving cancer chemotherapy medication    Protocols used: Nausea-A-AH

## 2024-11-22 NOTE — TELEPHONE ENCOUNTER
Refill Decision Note   Manisha Wynne  is requesting a refill authorization.  Brief Assessment and Rationale for Refill:  Approve     Medication Therapy Plan:         Comments:     Note composed:11:46 PM 11/21/2024

## 2024-11-25 ENCOUNTER — PROCEDURE VISIT (OUTPATIENT)
Dept: PODIATRY | Facility: CLINIC | Age: 74
End: 2024-11-25
Payer: MEDICARE

## 2024-11-25 VITALS
BODY MASS INDEX: 21.26 KG/M2 | DIASTOLIC BLOOD PRESSURE: 72 MMHG | SYSTOLIC BLOOD PRESSURE: 126 MMHG | WEIGHT: 140.31 LBS | HEART RATE: 69 BPM | HEIGHT: 68 IN

## 2024-11-25 DIAGNOSIS — M20.42 HAMMER TOES OF BOTH FEET: Primary | ICD-10-CM

## 2024-11-25 DIAGNOSIS — M20.41 HAMMER TOES OF BOTH FEET: Primary | ICD-10-CM

## 2024-11-25 LAB — O+P STL MICRO: NORMAL

## 2024-11-29 NOTE — PROCEDURES
Preoperative diagnosis: Hammertoe deformity right 2nd 4th digit  Procedure: Flexor tenotomy right 2nd 4th digit  Performed by: Dr. Ho      Attention was directed to the digit where 4cc of 1% lidocaine plain was injected in a digital block fashion, the foot was then prepped and draped using asceptic technique, anesthesia was confirmed, a #15 blade was inserted into the central/plantar aspect of the joint contracture and the flexor tendon was released, the digit was straightened manually and reduction was deemed adequate, tincture of benzoin and steri-strips were applied to splint the digit in a corrected position and closed with 4-0 nylon suture and dry sterile dressing applied to the foot. Capillary fill time was less than 3 seconds.    Postoperative instructions were discussed in detail.  Follow-up in 1 week for suture removal.

## 2024-12-02 NOTE — PROGRESS NOTES
Subjective:       Patient ID: Manisha Wynne is a 74 y.o. female.    Chief Complaint: Follow-up (fall) and Fall    Follow-up  Pertinent negatives include no abdominal pain, chest pain (arm pain or jaw pain), headaches, nausea or vomiting.   Fall  Pertinent negatives include no abdominal pain, headaches, nausea or vomiting.   She recently had another fall and hit her head/face.  No LOC. She is still coughing some.  No CP or SOB.  Review of Systems   Respiratory:  Negative for shortness of breath (PND or orthopnea).    Cardiovascular:  Negative for chest pain (arm pain or jaw pain).   Gastrointestinal:  Negative for abdominal pain, diarrhea, nausea and vomiting.   Genitourinary:  Negative for dysuria.   Neurological:  Negative for seizures, syncope and headaches.       Objective:      Physical Exam  Constitutional:       General: She is not in acute distress.     Appearance: She is well-developed.   HENT:      Head: Normocephalic.   Eyes:      Pupils: Pupils are equal, round, and reactive to light.   Neck:      Thyroid: No thyromegaly.      Vascular: No JVD.   Cardiovascular:      Rate and Rhythm: Normal rate and regular rhythm.      Heart sounds: Normal heart sounds. No murmur heard.     No friction rub. No gallop.   Pulmonary:      Effort: Pulmonary effort is normal.      Breath sounds: Normal breath sounds. No wheezing or rales.   Abdominal:      General: Bowel sounds are normal. There is no distension.      Palpations: Abdomen is soft. There is no mass.      Tenderness: There is no abdominal tenderness. There is no guarding or rebound.   Musculoskeletal:      Cervical back: Neck supple.   Lymphadenopathy:      Cervical: No cervical adenopathy.   Skin:     General: Skin is warm and dry.   Neurological:      Mental Status: She is alert and oriented to person, place, and time.      Deep Tendon Reflexes: Reflexes are normal and symmetric.   Psychiatric:         Behavior: Behavior normal.         Thought  Content: Thought content normal.         Judgment: Judgment normal.         Assessment:       1. Head trauma, initial encounter    2. Fall, initial encounter    3. Anemia, unspecified type    4. Post-COVID chronic cough    5. Light chain disease, kappa type    6. CEDRIC (obstructive sleep apnea)    7. Nevus    8. Need for vaccination        Plan:   Head trauma, initial encounter  -     CT Head Without Contrast; Future; Expected date: 11/01/2024  -     CT ORBITS WITHOUT CONTRAST; Future; Expected date: 11/01/2024    Fall, initial encounter  -     X-Ray Ribs 2 View Right; Future; Expected date: 11/01/2024    Anemia, unspecified type  -     CBC Auto Differential; Future; Expected date: 11/01/2024  -     Iron and TIBC; Future; Expected date: 11/01/2024  -     Ferritin; Future; Expected date: 11/01/2024    Post-COVID chronic cough  -     X-Ray Chest PA And Lateral; Future; Expected date: 11/01/2024    Light chain disease, kappa type  -     Ambulatory referral/consult to Hematology / Oncology; Future; Expected date: 11/08/2024  -     IMMUNOGLOBULIN FREE LT CHAINS BLOOD; Future; Expected date: 11/01/2024  -     Protein Electrophoresis, Serum; Future; Expected date: 11/01/2024    CEDRIC (obstructive sleep apnea)  -     Ambulatory referral/consult to Sleep Disorders; Future; Expected date: 11/08/2024    Nevus  -     Ambulatory referral/consult to Dermatology; Future; Expected date: 11/08/2024    Need for vaccination  -     Influenza - Trivalent (Adjuvanted)

## 2024-12-03 ENCOUNTER — PATIENT MESSAGE (OUTPATIENT)
Dept: INTERNAL MEDICINE | Facility: CLINIC | Age: 74
End: 2024-12-03
Payer: MEDICARE

## 2024-12-05 ENCOUNTER — OFFICE VISIT (OUTPATIENT)
Dept: PODIATRY | Facility: CLINIC | Age: 74
End: 2024-12-05
Payer: MEDICARE

## 2024-12-05 VITALS
HEIGHT: 68 IN | HEART RATE: 69 BPM | DIASTOLIC BLOOD PRESSURE: 76 MMHG | SYSTOLIC BLOOD PRESSURE: 126 MMHG | WEIGHT: 138.88 LBS | BODY MASS INDEX: 21.05 KG/M2

## 2024-12-05 DIAGNOSIS — M20.41 HAMMER TOES OF BOTH FEET: Primary | ICD-10-CM

## 2024-12-05 DIAGNOSIS — M20.42 HAMMER TOES OF BOTH FEET: Primary | ICD-10-CM

## 2024-12-05 PROCEDURE — 1157F ADVNC CARE PLAN IN RCRD: CPT | Mod: HCNC,CPTII,S$GLB, | Performed by: PODIATRIST

## 2024-12-05 PROCEDURE — 99999 PR PBB SHADOW E&M-EST. PATIENT-LVL IV: CPT | Mod: PBBFAC,HCNC,, | Performed by: PODIATRIST

## 2024-12-05 PROCEDURE — 3078F DIAST BP <80 MM HG: CPT | Mod: HCNC,CPTII,S$GLB, | Performed by: PODIATRIST

## 2024-12-05 PROCEDURE — 99024 POSTOP FOLLOW-UP VISIT: CPT | Mod: HCNC,S$GLB,, | Performed by: PODIATRIST

## 2024-12-05 PROCEDURE — 3288F FALL RISK ASSESSMENT DOCD: CPT | Mod: HCNC,CPTII,S$GLB, | Performed by: PODIATRIST

## 2024-12-05 PROCEDURE — 1125F AMNT PAIN NOTED PAIN PRSNT: CPT | Mod: HCNC,CPTII,S$GLB, | Performed by: PODIATRIST

## 2024-12-05 PROCEDURE — 1101F PT FALLS ASSESS-DOCD LE1/YR: CPT | Mod: HCNC,CPTII,S$GLB, | Performed by: PODIATRIST

## 2024-12-05 PROCEDURE — 3074F SYST BP LT 130 MM HG: CPT | Mod: HCNC,CPTII,S$GLB, | Performed by: PODIATRIST

## 2024-12-06 ENCOUNTER — LAB VISIT (OUTPATIENT)
Dept: LAB | Facility: HOSPITAL | Age: 74
End: 2024-12-06
Attending: INTERNAL MEDICINE
Payer: MEDICARE

## 2024-12-06 ENCOUNTER — OFFICE VISIT (OUTPATIENT)
Dept: INTERNAL MEDICINE | Facility: CLINIC | Age: 74
End: 2024-12-06
Payer: MEDICARE

## 2024-12-06 VITALS
HEIGHT: 68 IN | BODY MASS INDEX: 21.77 KG/M2 | WEIGHT: 143.63 LBS | HEART RATE: 64 BPM | DIASTOLIC BLOOD PRESSURE: 62 MMHG | SYSTOLIC BLOOD PRESSURE: 132 MMHG | OXYGEN SATURATION: 100 %

## 2024-12-06 DIAGNOSIS — A04.72 C. DIFFICILE COLITIS: ICD-10-CM

## 2024-12-06 DIAGNOSIS — A04.72 C. DIFFICILE COLITIS: Primary | ICD-10-CM

## 2024-12-06 LAB
ALBUMIN SERPL BCP-MCNC: 4 G/DL (ref 3.5–5.2)
ALP SERPL-CCNC: 69 U/L (ref 40–150)
ALT SERPL W/O P-5'-P-CCNC: 21 U/L (ref 10–44)
ANION GAP SERPL CALC-SCNC: 10 MMOL/L (ref 8–16)
AST SERPL-CCNC: 22 U/L (ref 10–40)
BASOPHILS # BLD AUTO: 0.05 K/UL (ref 0–0.2)
BASOPHILS NFR BLD: 0.7 % (ref 0–1.9)
BILIRUB SERPL-MCNC: 1.3 MG/DL (ref 0.1–1)
BUN SERPL-MCNC: 14 MG/DL (ref 8–23)
CALCIUM SERPL-MCNC: 10 MG/DL (ref 8.7–10.5)
CHLORIDE SERPL-SCNC: 109 MMOL/L (ref 95–110)
CO2 SERPL-SCNC: 21 MMOL/L (ref 23–29)
CREAT SERPL-MCNC: 0.9 MG/DL (ref 0.5–1.4)
DIFFERENTIAL METHOD BLD: ABNORMAL
EOSINOPHIL # BLD AUTO: 0.2 K/UL (ref 0–0.5)
EOSINOPHIL NFR BLD: 2.8 % (ref 0–8)
ERYTHROCYTE [DISTWIDTH] IN BLOOD BY AUTOMATED COUNT: 13.7 % (ref 11.5–14.5)
EST. GFR  (NO RACE VARIABLE): >60 ML/MIN/1.73 M^2
GLUCOSE SERPL-MCNC: 115 MG/DL (ref 70–110)
HCT VFR BLD AUTO: 36.4 % (ref 37–48.5)
HGB BLD-MCNC: 11.9 G/DL (ref 12–16)
IMM GRANULOCYTES # BLD AUTO: 0.02 K/UL (ref 0–0.04)
IMM GRANULOCYTES NFR BLD AUTO: 0.3 % (ref 0–0.5)
LYMPHOCYTES # BLD AUTO: 1.7 K/UL (ref 1–4.8)
LYMPHOCYTES NFR BLD: 23 % (ref 18–48)
MCH RBC QN AUTO: 30.1 PG (ref 27–31)
MCHC RBC AUTO-ENTMCNC: 32.7 G/DL (ref 32–36)
MCV RBC AUTO: 92 FL (ref 82–98)
MONOCYTES # BLD AUTO: 0.8 K/UL (ref 0.3–1)
MONOCYTES NFR BLD: 10.3 % (ref 4–15)
NEUTROPHILS # BLD AUTO: 4.6 K/UL (ref 1.8–7.7)
NEUTROPHILS NFR BLD: 62.9 % (ref 38–73)
NRBC BLD-RTO: 0 /100 WBC
PLATELET # BLD AUTO: 200 K/UL (ref 150–450)
PMV BLD AUTO: 11.2 FL (ref 9.2–12.9)
POTASSIUM SERPL-SCNC: 4.2 MMOL/L (ref 3.5–5.1)
PROT SERPL-MCNC: 7.3 G/DL (ref 6–8.4)
RBC # BLD AUTO: 3.95 M/UL (ref 4–5.4)
SODIUM SERPL-SCNC: 140 MMOL/L (ref 136–145)
WBC # BLD AUTO: 7.25 K/UL (ref 3.9–12.7)

## 2024-12-06 PROCEDURE — 1101F PT FALLS ASSESS-DOCD LE1/YR: CPT | Mod: HCNC,CPTII,S$GLB, | Performed by: INTERNAL MEDICINE

## 2024-12-06 PROCEDURE — 99213 OFFICE O/P EST LOW 20 MIN: CPT | Mod: HCNC,S$GLB,, | Performed by: INTERNAL MEDICINE

## 2024-12-06 PROCEDURE — 3288F FALL RISK ASSESSMENT DOCD: CPT | Mod: HCNC,CPTII,S$GLB, | Performed by: INTERNAL MEDICINE

## 2024-12-06 PROCEDURE — 99999 PR PBB SHADOW E&M-EST. PATIENT-LVL IV: CPT | Mod: PBBFAC,HCNC,, | Performed by: INTERNAL MEDICINE

## 2024-12-06 PROCEDURE — 3075F SYST BP GE 130 - 139MM HG: CPT | Mod: HCNC,CPTII,S$GLB, | Performed by: INTERNAL MEDICINE

## 2024-12-06 PROCEDURE — 3078F DIAST BP <80 MM HG: CPT | Mod: HCNC,CPTII,S$GLB, | Performed by: INTERNAL MEDICINE

## 2024-12-06 PROCEDURE — 36415 COLL VENOUS BLD VENIPUNCTURE: CPT | Mod: HCNC,PO | Performed by: INTERNAL MEDICINE

## 2024-12-06 PROCEDURE — 1157F ADVNC CARE PLAN IN RCRD: CPT | Mod: HCNC,CPTII,S$GLB, | Performed by: INTERNAL MEDICINE

## 2024-12-06 PROCEDURE — 1159F MED LIST DOCD IN RCRD: CPT | Mod: HCNC,CPTII,S$GLB, | Performed by: INTERNAL MEDICINE

## 2024-12-06 PROCEDURE — 1125F AMNT PAIN NOTED PAIN PRSNT: CPT | Mod: HCNC,CPTII,S$GLB, | Performed by: INTERNAL MEDICINE

## 2024-12-06 PROCEDURE — 85025 COMPLETE CBC W/AUTO DIFF WBC: CPT | Mod: HCNC | Performed by: INTERNAL MEDICINE

## 2024-12-06 PROCEDURE — 80053 COMPREHEN METABOLIC PANEL: CPT | Mod: HCNC | Performed by: INTERNAL MEDICINE

## 2024-12-08 NOTE — PROGRESS NOTES
Patient presents 1 week status post tenotomy procedure x2.  She is doing well.  Minimal discomfort.  Had some discomfort initially the 1st day or 2.  This has resolved.  Incision healing well/no signs of infection or dehiscence.  Neurovascular status intact.  Suture removed.  Follow-up as needed.

## 2024-12-18 ENCOUNTER — TELEPHONE (OUTPATIENT)
Dept: ELECTROPHYSIOLOGY | Facility: CLINIC | Age: 74
End: 2024-12-18
Payer: MEDICARE

## 2024-12-18 DIAGNOSIS — I49.5 SSS (SICK SINUS SYNDROME): Primary | ICD-10-CM

## 2024-12-19 ENCOUNTER — TELEPHONE (OUTPATIENT)
Dept: ELECTROPHYSIOLOGY | Facility: CLINIC | Age: 74
End: 2024-12-19
Payer: MEDICARE

## 2024-12-31 ENCOUNTER — CLINICAL SUPPORT (OUTPATIENT)
Dept: CARDIOLOGY | Facility: HOSPITAL | Age: 74
End: 2024-12-31
Payer: MEDICARE

## 2024-12-31 ENCOUNTER — CLINICAL SUPPORT (OUTPATIENT)
Dept: CARDIOLOGY | Facility: HOSPITAL | Age: 74
End: 2024-12-31
Attending: INTERNAL MEDICINE
Payer: MEDICARE

## 2024-12-31 DIAGNOSIS — Z95.0 PRESENCE OF CARDIAC PACEMAKER: ICD-10-CM

## 2024-12-31 DIAGNOSIS — R00.1 BRADYCARDIA, UNSPECIFIED: ICD-10-CM

## 2024-12-31 PROCEDURE — 93294 REM INTERROG EVL PM/LDLS PM: CPT | Mod: HCNC,,, | Performed by: INTERNAL MEDICINE

## 2024-12-31 PROCEDURE — 93296 REM INTERROG EVL PM/IDS: CPT | Mod: HCNC | Performed by: INTERNAL MEDICINE

## 2024-12-31 NOTE — PROGRESS NOTES
Subjective:       Patient ID: Manisha Wynne is a 74 y.o. female.    Chief Complaint: Follow-up, Fall, and Hypertension    Follow-up  Pertinent negatives include no abdominal pain, chest pain (arm pain or jaw pain), headaches, nausea or vomiting.   Fall  Pertinent negatives include no abdominal pain, headaches, nausea or vomiting.   Hypertension  Pertinent negatives include no chest pain (arm pain or jaw pain), headaches or shortness of breath (PND or orthopnea).    She had another fall and was diagnosed with C diff - she is on the mend. No CP or SOB.  Diarrhea is checking itself.  Review of Systems   Respiratory:  Negative for shortness of breath (PND or orthopnea).    Cardiovascular:  Negative for chest pain (arm pain or jaw pain).   Gastrointestinal:  Negative for abdominal pain, diarrhea, nausea and vomiting.   Genitourinary:  Negative for dysuria.   Neurological:  Negative for seizures, syncope and headaches.       Objective:      Physical Exam  Constitutional:       General: She is not in acute distress.     Appearance: She is well-developed.   HENT:      Head: Normocephalic.   Eyes:      Pupils: Pupils are equal, round, and reactive to light.   Neck:      Thyroid: No thyromegaly.      Vascular: No JVD.   Cardiovascular:      Rate and Rhythm: Normal rate and regular rhythm.      Heart sounds: Normal heart sounds. No murmur heard.     No friction rub. No gallop.   Pulmonary:      Effort: Pulmonary effort is normal.      Breath sounds: Normal breath sounds. No wheezing or rales.   Abdominal:      General: Bowel sounds are normal. There is no distension.      Palpations: Abdomen is soft. There is no mass.      Tenderness: There is no abdominal tenderness. There is no guarding or rebound.   Musculoskeletal:      Cervical back: Neck supple.   Lymphadenopathy:      Cervical: No cervical adenopathy.   Skin:     General: Skin is warm and dry.   Neurological:      Mental Status: She is alert and oriented to  person, place, and time.      Deep Tendon Reflexes: Reflexes are normal and symmetric.   Psychiatric:         Behavior: Behavior normal.         Thought Content: Thought content normal.         Judgment: Judgment normal.         Assessment:       1. C. difficile colitis        Plan:   C. difficile colitis  -     CBC Auto Differential; Future; Expected date: 12/06/2024  -     Comprehensive Metabolic Panel; Future; Expected date: 12/06/2024

## 2025-01-13 DIAGNOSIS — Z00.00 ENCOUNTER FOR MEDICARE ANNUAL WELLNESS EXAM: ICD-10-CM

## 2025-01-13 NOTE — PROGRESS NOTES
[facility]  Clinic Note    Date of Service: 1/14/2025  Patient seen at the request of: No ref. provider found    Reason for Consultation  neuropathy    Assessment:  Manisha Wynne is a 74 y.o. female who presents with neuropathy.    Presents with a chronic history of imbalance without vertigo associated with many falls.  Patient is status post C3-C6 fusion in 2022.  Patient notes continued falls.  Of note, the patient can not get an MRI due to her pacemaker.    On exam, the patient has an unstable gait with internal rotation of her bilateral knees, associated with bilateral hip flexor weakness, right knee flexion weakness, high arches, and hammertoes on bilateral feet.  Distal lower extremity strength is preserved.  Reflexes are elevated at the knees and in the bilateral upper extremities.  Babinski negative bilaterally.    EMG from 2021 showed bilateral chronic L5 radiculopathy and bilaterally absent minimum H-reflex latencies.  We will reassess the lower extremities with another EMG/nerve conduction study.    Differential diagnosis includes genetic neuropathies including CMT, however, patient has preserved distal lower extremity strength on exam.  She does report footdrop when she is fatigued.  Family history is unknown due to patient being adopted, but heritage is [facility].  The patient's elevated reflexes also suggest upper motor neuron involvement.    EMG/NCS 7/2021:  Bilateral chronic L5 radiculopathy.  Bilaterally absent minimum H-reflex latencies are suggestive of underlying bilateral S1 chronic radiculopathy versus underlying early sensory motor axonal polyneuropathy.     EMG/NCS 9/2024:  This is a normal electrophysiologic study of the right upper extremity.     Plan:    - EMG/NCS of bilateral lower extremities  - recommend continued exercises for lower extremity strength at home   - agree with enrolling back in [employer] balance program as discussed in clinic  - referral to orthotist      -  "RTC after EMG/NCS      A dictation device was used to produce this document. Use of such devices sometimes results in grammatical errors or replacement of words that sound similarly.     Signed:    Donald Daly MD  Neurology/Epilepsy  01/14/2025 1:02 PM      HPI:  Manisha Wnyne is a 74 y.o. female with   Past Medical History:   Diagnosis Date    Abnormal Pap smear     Atrial fibrillation     AV block, 1st degree 07/25/2012    C. difficile diarrhea     RESOLVED    Cataract     Depression 07/24/2012    Facet arthritis of lumbar region 03/31/2015    Falls     3 falls in the last 6 mos--noted 6/19/19    Hyperlipidemia     Hypertension 07/24/2012    Neuropathy     Other specified cardiac dysrhythmias(427.89)     Sleep apnea     Syncope 07/24/2012    Tremors of nervous system     hands bilaterally     Hx of C3-C6 posterior fusion 9/2022.    Patient has a chronic history of falls.  She got a pacemaker in 2012 after a workup for syncope.      Last big fall was in February of 2024, she was seen in the ER and then subsequently fell again and fractured her elbow, needing surgery.  Patient tends to fall forward.  She denies vertigo.  Patient denies a "numbing feeling", but she wakes up sometimes feeling like she has her shoes on.      Patient reports falling twice while going down a ramp.  Patient walks with a cane when outside.  Can ambulate without at home.      Patient reports dragging her feet when she gets tired.      Patient has a history of hammer toes.  She has had podiatric surgery.      Family history unknown due to adoption.  Heritage is Chinese from birth certificate.      This is the extent of the patient's complaints at this time.     From last neurosurgery note 9/12/2024 (Dr. Cheek):  Manisha Wynne is a 73 y.o. female (former  at [employer]) who presents as a referral from Dr. Mcduffie for evaluation of cervical myelopathy. The patient reports that she has had several falls from [id number], " "which prompted her to seek neurologic evaluation. The falls have resulted in injuries, including breaking her wrist. She also has numbness, tingling, and burning in her feet. She denies pain in her neck, but endorses in her mid- and low back. Her pain is 10/10 in the legs, 10/10 in the back, and 0/10 in the neck and arms. The pain is intermittent. It is made worse by sitting. It improves with standing. She denies any change in bowel/bladder habit.      She endorses handwriting changes, dropping objects, and balance/gait difficulty. She denies fine motor changes. She has not seen pain management for her neck, but she is seeing Dr. Booker for "sciatica."      CT of the cervical spine (the patient has a non-MRI-conditional compatible pacemaker in place) was obtained and personally reviewed, demonstrating significant stenosis at C5-C6>C4-C5. Flex ex shows some mild instability of C3 on C4.      She lives alone with her [place] cats. Her family is in [place] and [facility]. She does have friends nearby who will help to care for her in perioperative periods. She takes ASA 81 daily.      As of 5/6/21, the patient had the myelogram as requested. This is personally reviewed and demonstrates significant cord compression and OPLL. There is some stenosis at L4-L5 as well.      The patient reports that she has been well. She has had no falls. She has been doing PT for her sciatica, which she has found helpful. She does note that her tremor, which may be progressing slightly, is now interfering with her applying makeup at times.      As of 5/25/21, the patient reports that she has not had any significant changes. She presents today with a list of questions, together with friend Kavya Roberts on the phone. She also endorses a several year history of urinary incontinence (episodes occur overnight or with coughing) for which she was going to participate in pelvic floor rehab and see urogyn; however, this was interrupted by Covid.      As " of 1/11/22, the patient reports that she has had ongoing C. Diff since we scheduled surgery. She was seen about a potential colonoscopy today, but was advised against proceeding. She is due for a CT of the abdomen/pelvis next week to look at the pancreas. She is also being considered for a fecal transplant. There is also a clinical trial going on in which she's interested.      Dr. Maradiaga of ID has been quarterbacking the C. Diff response. The patient is frustrated by her lack of improvement.      She is less fatigued now than when she initially became sick, but she does not feel she has yet recovered to her baseline. She is also being recommended for cataract surgery and pacemaker generator replacement in about 13 months.      As of 8/4/22, the patient reports that she is finally over her C. Diff. 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 She notes that she has been really careful. She walks very slowly. She has noticed some tremor in her hands at rest. This gets better when she holds onto an object. She denies anosmia. She denies significant sleep difficulties and other non-motor symptoms of PD.      As of 12/22/22, the patient underwent C3-C6 posterior cervical decompression and fusion on 11/14/22. She reports that she has been doing well since surgery. She has NOT been using a bone growth stimulator. She denies fever, chills, or drainage from the incision. She has no complaints of C. Diff. She is eager to return to work in the 's office.      As of 2/2/23, the patient returns in scheduled follow-up. She reports she has been well. She is walking 6000 steps several times a week. She has returned to work, though she has been a bit more forgetful about dates and times. We discussed Dr. Cordero.      She saw Sunitha this morning, who is starting outpatient PT.      Bone growth stimulator was received, and she has been using it.      She reports that she had a mechanical trip and fall  in which her head didn't hit the ground last week.      As of 5/4/23, the patient returns in scheduled postoperative follow up. She reports that she has one additional month of PT.      She reports that she has had 3 falls since we saw each other last, but all of them have been mechanical. One she slipped on water, one she was getting up from the floor, once when walking on uneven ground (sidewalk was dug up). She notes that she moved about a month ago.       She occasionally has headaches. She has occasional neck pain in the left upper portion, but this has been improving. The cats have been well. She is back at work 2 days a week.      As of 2/22/24, the patient returns in scheduled follow-up. Overall, she has been well, though she is still falling (most recently in December). She sustained a head laceration after a fall at [facility] in November that now appears well healed.      She requests a referral to neurology for her ongoing neuropathy.      She is also complaining of low back pain with sciatica. She denies any new weakness associated with this and is interested in doing [employer].      Propanolol has been helping her tremor.      As of 9/12/24, the patient returns after having completed myelogram of the entire spine. This demonstrates that the right C3 screw is too medial (as previously appreciated) but that there is no mass effect on the thecal sac nor spinal cord.      She is still hoping to re-establish with a neurologist for her peripheral neuropathy. 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 She is still working.       TSH   Date Value Ref Range Status   11/08/2024 2.950 0.400 - 4.000 uIU/mL Final   03/08/2024 3.125 0.400 - 4.000 uIU/mL Final     Vitamin B-12   Date Value Ref Range Status   11/16/2023 >2000 (H) 210 - 950 pg/mL Final   03/13/2023 616 210 - 950 pg/mL Final     Hemoglobin A1C   Date Value Ref Range Status   11/16/2023 5.5 4.0 - 5.6 %  Final     Comment:     ADA Screening Guidelines:  5.7-6.4%  Consistent with prediabetes  >or=6.5%  Consistent with diabetes    High levels of fetal hemoglobin interfere with the HbA1C  assay. Heterozygous hemoglobin variants (HbS, HgC, etc)do  not significantly interfere with this assay.   However, presence of multiple variants may affect accuracy.     08/05/2022 5.3 4.0 - 5.6 % Final     Comment:     ADA Screening Guidelines:  5.7-6.4%  Consistent with prediabetes  >or=6.5%  Consistent with diabetes    High levels of fetal hemoglobin interfere with the HbA1C  assay. Heterozygous hemoglobin variants (HbS, HgC, etc)do  not significantly interfere with this assay.   However, presence of multiple variants may affect accuracy.           Review of Systems:  ROS negative unless noted in HPI    Past Surgical History:  Past Surgical History:   Procedure Laterality Date    APPLICATION OF CARTILAGE GRAFT Bilateral 12/19/2019    Procedure: APPLICATION, CARTILAGE GRAFT AURICULAR JEZ;  Surgeon: Martinez Flores III, MD;  Location: [facility];  Service: ENT;  Laterality: Bilateral;    CARDIAC PACEMAKER PLACEMENT  09/07/2012    [id number]    CATARACT EXTRACTION W/  INTRAOCULAR LENS IMPLANT Right 5/16/2022    Procedure: EXTRACTION, CATARACT, WITH IOL INSERTION;  Surgeon: Ines Aguilera MD;  Location: [facility] OR;  Service: Ophthalmology;  Laterality: Right;  Catalys     CATARACT EXTRACTION W/  INTRAOCULAR LENS IMPLANT Left 6/20/2022    Procedure: EXTRACTION, CATARACT, WITH IOL INSERTION;  Surgeon: Ines Aguilera MD;  Location: [facility] OR;  Service: Ophthalmology;  Laterality: Left;  Catalys     DILATION AND CURETTAGE OF UTERUS      Hx of precancerous cells?    ENDOSCOPIC ULTRASOUND OF UPPER GASTROINTESTINAL TRACT N/A 7/5/2019    Procedure: ULTRASOUND, UPPER GI TRACT, ENDOSCOPIC;  Surgeon: Kev Calderon MD;  Location: [facility] ENDO ([address] [place] [address]);  Service: Endoscopy;  Laterality: N/A;  Pacemaker-Adam   appt confirmed-rb     MYELOGRAPHY N/A 5/4/2021    Procedure: Myelogram  CERVICAL, THORACIC AND LUMBAR;  Surgeon: Leandro Diagnostic Provider;  Location: [facility] OR 2ND FLR;  Service: Radiology;  Laterality: N/A;    NASAL SEPTOPLASTY N/A 12/19/2019    Procedure: SEPTOPLASTY, NOSE;  Surgeon: Martinez Flores III, MD;  Location: [facility] OR;  Service: ENT;  Laterality: N/A;  FOLLOW DR SALVATORE KIMBROUGH PROTOCOL    OPEN REDUCTION AND INTERNAL FIXATION (ORIF) OF FRACTURE OF DISTAL RADIUS Left 1/24/2020    Procedure: ORIF, FRACTURE, RADIUS, DISTAL-left;  Surgeon: Ellen Moe MD;  Location: [facility] OR;  Service: Orthopedics;  Laterality: Left;    OPEN REDUCTION AND INTERNAL FIXATION (ORIF) OF INJURY OF ELBOW Right 2/28/2024    Procedure: ORIF, ELBOW;  Surgeon: Ellen Moe MD;  Location: [facility];  Service: Orthopedics;  Laterality: Right;    POSTERIOR FUSION OF CERVICAL SPINE WITH LAMINECTOMY N/A 11/14/2022    Procedure: LAMINECTOMY, SPINE, CERVICAL, WITH POSTERIOR FUSION C3-C6;  Surgeon: Nicolette Cheek MD;  [address];  Service: Neurosurgery;  Laterality: N/A;  TORONTO III, ASA III, BLOOD TYPE AND SCREEN, NEUROMONITORING EMG SEP MEP, BRACE/HALO [place], BED REGULAR BED, HEADREST [place], POSITION PRONE, SPECIAL EQUIPMENT NIKI MIDDLETON, RADIOLOGY C-ARM, EXT. BONE STIMULATOR BIOMET    REPLACEMENT OF PACEMAKER GENERATOR Left 10/7/2022    Procedure: REPLACEMENT, PACEMAKER GENERATOR;  Surgeon: John Sheets MD;  Location: [facility] EP LAB;  Service: Cardiology;  Laterality: Left;  YAS, SSS, AVB, Dual PPM Gen Change,SJM, MAC ,AR, 3 prep,** Adam dcPPM in situ**    SURGICAL REMOVAL OF NASAL TURBINATE Bilateral 12/19/2019    Procedure: EXCISION, NASAL TURBINATE;  Surgeon: Martinez Flores III, MD;  Location: [facility];  Service: ENT;  Laterality: Bilateral;    TONSILLECTOMY      WISDOM TOOTH EXTRACTION         Family History:  Family History   Adopted: Yes   Problem Relation Name Age of Onset    Amblyopia Neg Hx      Blindness Neg Hx       Cataracts Neg Hx      Glaucoma Neg Hx      Macular degeneration Neg Hx      Retinal detachment Neg Hx         Social History:  Social History     Tobacco Use    Smoking status: Former     Current packs/day: 0.00     Average packs/day: 0.3 packs/day for 15.0 years (3.8 ttl pk-yrs)     Types: Cigarettes     Start date: 1967     Quit date: 1982     Years since quittin.5    Smokeless tobacco: Former   Substance Use Topics    Alcohol use: Yes     Comment: no daily or heavy use, ~4 times per month    Drug use: No       Allergies:  Patient has no known allergies.    Outpatient Medications:  Prior to Admission medications    Medication Sig Start Date End Date Taking? Authorizing Provider   acetaminophen (TYLENOL) 500 MG tablet Take 2 tablets (1,000 mg total) by mouth 2 (two) times daily as needed for Pain. Begin post op day 3. 24   Sarmad Varghese PA-C   aspirin (ECOTRIN) 81 MG EC tablet Take 1 tablet (81 mg total) by mouth 2 (two) times a day. 24  Sarmad Varghese PA-C   buPROPion (WELLBUTRIN XL) 300 MG 24 hr tablet Take 1 tablet by mouth once daily. 12/15/22   Provider, Historical   estradioL (ESTRACE) 0.01 % (0.1 mg/gram) vaginal cream Use one gram nightly x 2 weeks then twice weekly 24   Jessica Messer NP   FLUoxetine 40 MG capsule Take 40 mg by mouth. 10/11/24   Provider, Historical   fluticasone propionate (FLONASE) 50 mcg/actuation nasal spray 1 spray (50 mcg total) by Each Nostril route once daily. 10/9/24   Louise Canela NP   lisdexamfetamine (VYVANSE) 70 MG capsule Take 70 mg by mouth every morning.    Provider, Historical   losartan (COZAAR) 25 MG tablet TAKE 1 TABLET ONE TIME DAILY 6/3/24   Natali Comer MD   melatonin (MELATIN) 5 mg Take 1 tablet by mouth every evening. (For insomnia)    Provider, Historical   methocarbamoL (ROBAXIN) 500 MG Tab One - two daily as needed for back pain 5/3/24   Iris Blue MD   methyl salicylate-menthol 15-10%  "15-10 % Crea Apply topically every evening. 12/13/22   Angely Dillon NP   ondansetron (ZOFRAN-ODT) 4 MG TbDL Take 1 tablet (4 mg total) by mouth every 8 (eight) hours as needed (every 8-12 hrs prn nausea). 11/22/24   Ellen Cronin MD   pravastatin (PRAVACHOL) 40 MG tablet TAKE 1 TABLET ONE TIME DAILY 11/21/24   Iris Blue MD   PROLIA 60 mg/mL Syrg Inject 60 mg into the skin every 6 (six) months. 2/23/23   Provider, Historical   propranoloL (INDERAL) 20 MG tablet TAKE 1 TABLET TWICE DAILY 4/25/24   Natali Comer MD   vibegron (GEMTESA) 75 mg Tab Take 1 tablet (75 mg total) by mouth Daily. 10/24/24   Jessica Messer NP       Physical exam:    Vitals: /81 (BP Location: Right arm, Patient Position: Sitting)   Pulse 69   Ht 5' 8" (1.727 m)   Wt 61.8 kg (136 lb 3.9 oz)   LMP 10/01/2003   BMI 20.72 kg/m²     General:   Sitting in chair, in no distress, well-nourished, well-developed, appears stated age.  Head/Neck:   Normocephalic,atraumatic  Pulm:  Non-labored breathing     Mental Status: Alert and oriented to person, time, place, situation. Speech spontaneous and fluent without paraphasias; no dysarthria  CN:  II: visual fields full  III, IV, VI: EOM intact without nystagmus or diplopia.   V: Sensation to light touch full and symmetric in V1-3. [place] contraction full bilaterally.   VII: Facial movement full and symmetric.   VIII: Hearing grossly normal to conversation.  IX, X: Palate midline with symmetric elevation.    XI: SCM and trapezius: 5/5 bilaterally.   XII: Tongue midline without fasciculations.  Motor: Normal bulk and tone throughout all four extremities.   LUE: D: 5/5; B: 5/5; T:  5/5; WF: 5/5; WE:  5/5; IO: 5/5   RUE: D: 5/5; B: 5/5; T:  5/5; WF:5/5; WE:  5/5; IO: 5/5   LLE: HF: 4/5, KE: 5/5, KF: 5/5, DF: 5/5, PF: 5/5  RLE: HF: 4/5, KE: 5/5, KF: 4+/5, DF: 5/5, PF: 5/5  Unable to stand seated position without help from her upper extremities  No tremors "   Sensory: Decreased vibratory sensation to the BLE.  Mostly in the R big toe and right patella  Reflexes: LUE: Biceps 3+ brachioradialis 3+  RUE: Biceps 3+, brachioradialis 3+  LLE: Knee 3+ suprapatellar, Babinski equivocal  RLE: Knee 3+ suprapatellar, Babinski downgoing  Feet are arched prominently bilaterally  Hammer toes present  Coordination:  Intact and symmetric to finger-to-nose and heel-to-shin.  Gait: Wobbly gait with knees internally rotated.  Unable to tandem gait.  Romberg negative.      Imaging:  All pertinent imaging was personally reviewed.      I spent a total of 51 minutes on the day of the visit. This includes face to face time and non-face to face time preparing to see the patient (eg, review of tests), obtaining and/or reviewing separately obtained history, documenting clinical information in the electronic or other health record, independently interpreting results and communicating results to the patient/family/caregiver, or care coordinator.

## 2025-01-14 ENCOUNTER — OFFICE VISIT (OUTPATIENT)
Dept: NEUROLOGY | Facility: CLINIC | Age: 75
End: 2025-01-14
Payer: MEDICARE

## 2025-01-14 VITALS
WEIGHT: 136.25 LBS | BODY MASS INDEX: 20.65 KG/M2 | SYSTOLIC BLOOD PRESSURE: 136 MMHG | HEIGHT: 68 IN | DIASTOLIC BLOOD PRESSURE: 81 MMHG | HEART RATE: 69 BPM

## 2025-01-14 DIAGNOSIS — G57.93 UNSPECIFIED MONONEUROPATHY OF BILATERAL LOWER LIMBS: ICD-10-CM

## 2025-01-14 DIAGNOSIS — G62.9 NEUROPATHY: Primary | ICD-10-CM

## 2025-01-14 DIAGNOSIS — G95.9 DISEASE OF SPINAL CORD, UNSPECIFIED: ICD-10-CM

## 2025-01-14 PROCEDURE — 1100F PTFALLS ASSESS-DOCD GE2>/YR: CPT | Mod: CPTII,S$GLB,, | Performed by: STUDENT IN AN ORGANIZED HEALTH CARE EDUCATION/TRAINING PROGRAM

## 2025-01-14 PROCEDURE — 1157F ADVNC CARE PLAN IN RCRD: CPT | Mod: CPTII,S$GLB,, | Performed by: STUDENT IN AN ORGANIZED HEALTH CARE EDUCATION/TRAINING PROGRAM

## 2025-01-14 PROCEDURE — 3288F FALL RISK ASSESSMENT DOCD: CPT | Mod: CPTII,S$GLB,, | Performed by: STUDENT IN AN ORGANIZED HEALTH CARE EDUCATION/TRAINING PROGRAM

## 2025-01-14 PROCEDURE — 99215 OFFICE O/P EST HI 40 MIN: CPT | Mod: S$GLB,,, | Performed by: STUDENT IN AN ORGANIZED HEALTH CARE EDUCATION/TRAINING PROGRAM

## 2025-01-14 PROCEDURE — 3079F DIAST BP 80-89 MM HG: CPT | Mod: CPTII,S$GLB,, | Performed by: STUDENT IN AN ORGANIZED HEALTH CARE EDUCATION/TRAINING PROGRAM

## 2025-01-14 PROCEDURE — 1126F AMNT PAIN NOTED NONE PRSNT: CPT | Mod: CPTII,S$GLB,, | Performed by: STUDENT IN AN ORGANIZED HEALTH CARE EDUCATION/TRAINING PROGRAM

## 2025-01-14 PROCEDURE — 3075F SYST BP GE 130 - 139MM HG: CPT | Mod: CPTII,S$GLB,, | Performed by: STUDENT IN AN ORGANIZED HEALTH CARE EDUCATION/TRAINING PROGRAM

## 2025-01-14 PROCEDURE — 1159F MED LIST DOCD IN RCRD: CPT | Mod: CPTII,S$GLB,, | Performed by: STUDENT IN AN ORGANIZED HEALTH CARE EDUCATION/TRAINING PROGRAM

## 2025-01-14 PROCEDURE — 99999 PR PBB SHADOW E&M-EST. PATIENT-LVL III: CPT | Mod: PBBFAC,,, | Performed by: STUDENT IN AN ORGANIZED HEALTH CARE EDUCATION/TRAINING PROGRAM

## 2025-01-27 LAB
OHS CV AF BURDEN PERCENT: < 1
OHS CV DC REMOTE DEVICE TYPE: NORMAL
OHS CV ICD SHOCK: NO
OHS CV RV PACING PERCENT: 1.6 %

## 2025-01-30 ENCOUNTER — TELEPHONE (OUTPATIENT)
Dept: PHYSICAL MEDICINE AND REHAB | Facility: CLINIC | Age: 75
End: 2025-01-30
Payer: MEDICARE

## 2025-02-10 ENCOUNTER — OFFICE VISIT (OUTPATIENT)
Dept: INTERNAL MEDICINE | Facility: CLINIC | Age: 75
End: 2025-02-10
Payer: MEDICARE

## 2025-02-10 ENCOUNTER — LAB VISIT (OUTPATIENT)
Dept: LAB | Facility: HOSPITAL | Age: 75
End: 2025-02-10
Attending: INTERNAL MEDICINE
Payer: MEDICARE

## 2025-02-10 VITALS
WEIGHT: 134.69 LBS | HEIGHT: 68 IN | OXYGEN SATURATION: 98 % | BODY MASS INDEX: 20.41 KG/M2 | HEART RATE: 70 BPM | DIASTOLIC BLOOD PRESSURE: 60 MMHG | SYSTOLIC BLOOD PRESSURE: 112 MMHG

## 2025-02-10 DIAGNOSIS — R19.7 DIARRHEA, UNSPECIFIED TYPE: Primary | ICD-10-CM

## 2025-02-10 DIAGNOSIS — N18.31 STAGE 3A CHRONIC KIDNEY DISEASE: ICD-10-CM

## 2025-02-10 DIAGNOSIS — F33.1 MAJOR DEPRESSIVE DISORDER, RECURRENT, MODERATE: ICD-10-CM

## 2025-02-10 DIAGNOSIS — I48.0 PAROXYSMAL ATRIAL FIBRILLATION: ICD-10-CM

## 2025-02-10 DIAGNOSIS — A04.71 RECURRENT CLOSTRIDIOIDES DIFFICILE DIARRHEA: ICD-10-CM

## 2025-02-10 DIAGNOSIS — D32.9 MENINGIOMA: ICD-10-CM

## 2025-02-10 DIAGNOSIS — S09.90XA HEAD TRAUMA, INITIAL ENCOUNTER: ICD-10-CM

## 2025-02-10 DIAGNOSIS — R19.7 DIARRHEA, UNSPECIFIED TYPE: ICD-10-CM

## 2025-02-10 PROBLEM — L97.511 NON-PRESSURE CHRONIC ULCER OF OTHER PART OF RIGHT FOOT LIMITED TO BREAKDOWN OF SKIN: Status: RESOLVED | Noted: 2024-02-22 | Resolved: 2025-02-10

## 2025-02-10 LAB
ALBUMIN SERPL BCP-MCNC: 3.2 G/DL (ref 3.5–5.2)
ALP SERPL-CCNC: 105 U/L (ref 40–150)
ALT SERPL W/O P-5'-P-CCNC: 25 U/L (ref 10–44)
ANION GAP SERPL CALC-SCNC: 10 MMOL/L (ref 8–16)
AST SERPL-CCNC: 18 U/L (ref 10–40)
BASOPHILS # BLD AUTO: 0.04 K/UL (ref 0–0.2)
BASOPHILS NFR BLD: 0.4 % (ref 0–1.9)
BILIRUB SERPL-MCNC: 0.6 MG/DL (ref 0.1–1)
BUN SERPL-MCNC: 15 MG/DL (ref 8–23)
CALCIUM SERPL-MCNC: 9.3 MG/DL (ref 8.7–10.5)
CHLORIDE SERPL-SCNC: 107 MMOL/L (ref 95–110)
CO2 SERPL-SCNC: 23 MMOL/L (ref 23–29)
CREAT SERPL-MCNC: 1 MG/DL (ref 0.5–1.4)
DIFFERENTIAL METHOD BLD: ABNORMAL
EOSINOPHIL # BLD AUTO: 0.1 K/UL (ref 0–0.5)
EOSINOPHIL NFR BLD: 1.5 % (ref 0–8)
ERYTHROCYTE [DISTWIDTH] IN BLOOD BY AUTOMATED COUNT: 13.5 % (ref 11.5–14.5)
EST. GFR  (NO RACE VARIABLE): 58.4 ML/MIN/1.73 M^2
GLUCOSE SERPL-MCNC: 102 MG/DL (ref 70–110)
HCT VFR BLD AUTO: 31.7 % (ref 37–48.5)
HGB BLD-MCNC: 10.1 G/DL (ref 12–16)
IMM GRANULOCYTES # BLD AUTO: 0.06 K/UL (ref 0–0.04)
IMM GRANULOCYTES NFR BLD AUTO: 0.6 % (ref 0–0.5)
LYMPHOCYTES # BLD AUTO: 1.8 K/UL (ref 1–4.8)
LYMPHOCYTES NFR BLD: 18.2 % (ref 18–48)
MCH RBC QN AUTO: 29.4 PG (ref 27–31)
MCHC RBC AUTO-ENTMCNC: 31.9 G/DL (ref 32–36)
MCV RBC AUTO: 92 FL (ref 82–98)
MONOCYTES # BLD AUTO: 1.3 K/UL (ref 0.3–1)
MONOCYTES NFR BLD: 13.3 % (ref 4–15)
NEUTROPHILS # BLD AUTO: 6.4 K/UL (ref 1.8–7.7)
NEUTROPHILS NFR BLD: 66 % (ref 38–73)
NRBC BLD-RTO: 0 /100 WBC
PLATELET # BLD AUTO: 216 K/UL (ref 150–450)
PMV BLD AUTO: 10.5 FL (ref 9.2–12.9)
POTASSIUM SERPL-SCNC: 4.1 MMOL/L (ref 3.5–5.1)
PROT SERPL-MCNC: 6.9 G/DL (ref 6–8.4)
RBC # BLD AUTO: 3.44 M/UL (ref 4–5.4)
SODIUM SERPL-SCNC: 140 MMOL/L (ref 136–145)
TSH SERPL DL<=0.005 MIU/L-ACNC: 2.52 UIU/ML (ref 0.4–4)
WBC # BLD AUTO: 9.64 K/UL (ref 3.9–12.7)

## 2025-02-10 PROCEDURE — 3288F FALL RISK ASSESSMENT DOCD: CPT | Mod: HCNC,CPTII,S$GLB, | Performed by: INTERNAL MEDICINE

## 2025-02-10 PROCEDURE — 3074F SYST BP LT 130 MM HG: CPT | Mod: HCNC,CPTII,S$GLB, | Performed by: INTERNAL MEDICINE

## 2025-02-10 PROCEDURE — 1159F MED LIST DOCD IN RCRD: CPT | Mod: HCNC,CPTII,S$GLB, | Performed by: INTERNAL MEDICINE

## 2025-02-10 PROCEDURE — 80053 COMPREHEN METABOLIC PANEL: CPT | Mod: HCNC | Performed by: INTERNAL MEDICINE

## 2025-02-10 PROCEDURE — 36415 COLL VENOUS BLD VENIPUNCTURE: CPT | Mod: HCNC,PO | Performed by: INTERNAL MEDICINE

## 2025-02-10 PROCEDURE — 3078F DIAST BP <80 MM HG: CPT | Mod: HCNC,CPTII,S$GLB, | Performed by: INTERNAL MEDICINE

## 2025-02-10 PROCEDURE — 1101F PT FALLS ASSESS-DOCD LE1/YR: CPT | Mod: HCNC,CPTII,S$GLB, | Performed by: INTERNAL MEDICINE

## 2025-02-10 PROCEDURE — 99214 OFFICE O/P EST MOD 30 MIN: CPT | Mod: HCNC,S$GLB,, | Performed by: INTERNAL MEDICINE

## 2025-02-10 PROCEDURE — 84443 ASSAY THYROID STIM HORMONE: CPT | Mod: HCNC | Performed by: INTERNAL MEDICINE

## 2025-02-10 PROCEDURE — 1157F ADVNC CARE PLAN IN RCRD: CPT | Mod: HCNC,CPTII,S$GLB, | Performed by: INTERNAL MEDICINE

## 2025-02-10 PROCEDURE — 99999 PR PBB SHADOW E&M-EST. PATIENT-LVL V: CPT | Mod: PBBFAC,HCNC,, | Performed by: INTERNAL MEDICINE

## 2025-02-10 PROCEDURE — 1125F AMNT PAIN NOTED PAIN PRSNT: CPT | Mod: HCNC,CPTII,S$GLB, | Performed by: INTERNAL MEDICINE

## 2025-02-10 PROCEDURE — 85025 COMPLETE CBC W/AUTO DIFF WBC: CPT | Mod: HCNC | Performed by: INTERNAL MEDICINE

## 2025-02-10 NOTE — PROGRESS NOTES
Subjective:       Patient ID: Manisha Wynne is a 74 y.o. female.    Chief Complaint: Follow-up, Diarrhea, and Abdominal Cramping    Follow-up  Pertinent negatives include no abdominal pain, chest pain (arm pain or jaw pain), headaches, nausea or vomiting.   Diarrhea   Pertinent negatives include no abdominal pain, headaches or vomiting.   Abdominal Cramping  Associated symptoms include diarrhea. Pertinent negatives include no dysuria, headaches, nausea or vomiting.    Pt completed 6 week taper of vanco the first week in January and by the end of January the diarrhea has returned.  She fell getting out of bed Friday night and hit her head because she had to race to the bathroom.  No CP or SOB. She has lost weight.    Review of Systems   Respiratory:  Negative for shortness of breath (PND or orthopnea).    Cardiovascular:  Negative for chest pain (arm pain or jaw pain).   Gastrointestinal:  Positive for diarrhea. Negative for abdominal pain, nausea and vomiting.   Genitourinary:  Negative for dysuria.   Neurological:  Negative for seizures, syncope and headaches.       Objective:      Physical Exam  Constitutional:       General: She is not in acute distress.     Appearance: She is well-developed.   HENT:      Head: Normocephalic.   Eyes:      Pupils: Pupils are equal, round, and reactive to light.   Neck:      Thyroid: No thyromegaly.      Vascular: No JVD.   Cardiovascular:      Rate and Rhythm: Normal rate and regular rhythm.      Heart sounds: Normal heart sounds. No murmur heard.     No friction rub. No gallop.   Pulmonary:      Effort: Pulmonary effort is normal.      Breath sounds: Normal breath sounds. No wheezing or rales.   Abdominal:      General: Bowel sounds are normal. There is no distension.      Palpations: Abdomen is soft. There is no mass.      Tenderness: There is no abdominal tenderness. There is no guarding or rebound.   Musculoskeletal:      Cervical back: Neck supple.    Lymphadenopathy:      Cervical: No cervical adenopathy.   Skin:     General: Skin is warm and dry.      Comments: Bruise to L shoulder    R chest wall with rib prominence (3rd rib) due to previous fracture.   Neurological:      Mental Status: She is alert and oriented to person, place, and time.      Deep Tendon Reflexes: Reflexes are normal and symmetric.   Psychiatric:         Behavior: Behavior normal.         Thought Content: Thought content normal.         Judgment: Judgment normal.         Assessment:       1. Diarrhea, unspecified type    2. Head trauma, initial encounter    3. Meningioma    4. Major depressive disorder, recurrent, moderate    5. Paroxysmal atrial fibrillation    6. Stage 3a chronic kidney disease    7. Recurrent Clostridioides difficile diarrhea        Plan:   Diarrhea, unspecified type  -     CLOSTRIDIUM DIFFICILE; Future; Expected date: 02/10/2025  -     Ambulatory referral/consult to Gastroenterology; Future; Expected date: 02/17/2025  -     CBC Auto Differential; Future; Expected date: 02/10/2025  -     Comprehensive Metabolic Panel; Future; Expected date: 02/10/2025  -     TSH; Future; Expected date: 02/10/2025  -     CLOSTRIDIUM DIFFICILE; Future; Expected date: 02/10/2025  -     CLOSTRIDIUM DIFFICILE; Future; Expected date: 02/10/2025    Head trauma, initial encounter  -     CT Head Without Contrast; Future; Expected date: 02/10/2025    Meningioma  Stable and monitored  Major depressive disorder, recurrent, moderate  Stable and controlled  Paroxysmal atrial fibrillation  Per electro phys   Stage 3a chronic kidney disease  improved  Recurrent Clostridioides difficile diarrhea  Try fidaxomicin - gastro appt and message sent to ID as she did a fecal transplant last time  Other orders  -     fidaxomicin (DIFICID) 200 mg Tab; One twice daily for 5 days then one every other day for 20 days  Dispense: 20 tablet; Refill: 0

## 2025-02-12 ENCOUNTER — LAB VISIT (OUTPATIENT)
Dept: LAB | Facility: HOSPITAL | Age: 75
End: 2025-02-12
Attending: INTERNAL MEDICINE
Payer: MEDICARE

## 2025-02-12 DIAGNOSIS — R19.7 DIARRHEA, UNSPECIFIED TYPE: ICD-10-CM

## 2025-02-12 PROCEDURE — 87324 CLOSTRIDIUM AG IA: CPT | Mod: 59,HCNC | Performed by: INTERNAL MEDICINE

## 2025-02-13 ENCOUNTER — HOSPITAL ENCOUNTER (OUTPATIENT)
Dept: RADIOLOGY | Facility: HOSPITAL | Age: 75
Discharge: HOME | End: 2025-02-13
Attending: INTERNAL MEDICINE
Payer: MEDICARE

## 2025-02-13 DIAGNOSIS — S09.90XA HEAD TRAUMA, INITIAL ENCOUNTER: ICD-10-CM

## 2025-02-13 LAB
C DIFF GDH STL QL: NEGATIVE
C DIFF TOX A+B STL QL IA: NEGATIVE

## 2025-02-13 PROCEDURE — 70450 CT HEAD/BRAIN W/O DYE: CPT | Mod: TC,HCNC

## 2025-02-13 PROCEDURE — 70450 CT HEAD/BRAIN W/O DYE: CPT | Mod: 26,HCNC,, | Performed by: RADIOLOGY

## 2025-02-14 RX ORDER — PROPRANOLOL HYDROCHLORIDE 20 MG/1
20 TABLET ORAL 2 TIMES DAILY
Qty: 180 TABLET | Refills: 1 | Status: SHIPPED | OUTPATIENT
Start: 2025-02-14

## 2025-02-20 ENCOUNTER — PATIENT MESSAGE (OUTPATIENT)
Dept: INTERNAL MEDICINE | Facility: CLINIC | Age: 75
End: 2025-02-20
Payer: MEDICARE

## 2025-02-20 ENCOUNTER — PATIENT MESSAGE (OUTPATIENT)
Dept: ADMINISTRATIVE | Facility: OTHER | Age: 75
End: 2025-02-20
Payer: MEDICARE

## 2025-02-21 ENCOUNTER — OFFICE VISIT (OUTPATIENT)
Dept: DERMATOLOGY | Facility: CLINIC | Age: 75
End: 2025-02-21
Payer: MEDICARE

## 2025-02-21 DIAGNOSIS — D18.01 CHERRY ANGIOMA: ICD-10-CM

## 2025-02-21 DIAGNOSIS — L81.4 LENTIGO: ICD-10-CM

## 2025-02-21 DIAGNOSIS — D22.9 MULTIPLE BENIGN NEVI: Primary | ICD-10-CM

## 2025-02-21 DIAGNOSIS — Z12.83 SCREENING EXAM FOR SKIN CANCER: ICD-10-CM

## 2025-02-21 DIAGNOSIS — Z12.83 SKIN CANCER SCREENING: ICD-10-CM

## 2025-02-21 DIAGNOSIS — L82.1 SEBORRHEIC KERATOSES: ICD-10-CM

## 2025-02-21 NOTE — PROGRESS NOTES
Subjective:      Patient ID:  Manisha Wynne is a 74 y.o. female who presents for   Chief Complaint   Patient presents with    Skin Check     History of Present Illness: The patient presents for TBSE.    Pt here to establish care with a few spots in question.    NMSC      Review of Systems   Skin:  Positive for activity-related sunscreen use. Negative for daily sunscreen use, recent sunburn and wears hat.   Hematologic/Lymphatic: Bruises/bleeds easily.       Objective:   Physical Exam   Constitutional: She appears well-developed and well-nourished. No distress.   Neurological: She is alert and oriented to person, place, and time. She is not disoriented.   Psychiatric: She has a normal mood and affect.   Skin:   Areas Examined (abnormalities noted in diagram):   Scalp / Hair Palpated and Inspected  Head / Face Inspection Performed  Neck Inspection Performed  Chest / Axilla Inspection Performed  Abdomen Inspection Performed  Genitals / Buttocks / Groin Inspection Performed  Back Inspection Performed  RUE Inspected  LUE Inspection Performed  RLE Inspected  LLE Inspection Performed  Nails and Digits Inspection Performed                 Diagram Legend     Erythematous scaling macule/papule c/w actinic keratosis       Vascular papule c/w angioma      Pigmented verrucoid papule/plaque c/w seborrheic keratosis      Yellow umbilicated papule c/w sebaceous hyperplasia      Irregularly shaped tan macule c/w lentigo     1-2 mm smooth white papules consistent with Milia      Movable subcutaneous cyst with punctum c/w epidermal inclusion cyst      Subcutaneous movable cyst c/w pilar cyst      Firm pink to brown papule c/w dermatofibroma      Pedunculated fleshy papule(s) c/w skin tag(s)      Evenly pigmented macule c/w junctional nevus     Mildly variegated pigmented, slightly irregular-bordered macule c/w mildly atypical nevus      Flesh colored to evenly pigmented papule c/w intradermal nevus       Pink pearly  papule/plaque c/w basal cell carcinoma      Erythematous hyperkeratotic cursted plaque c/w SCC      Surgical scar with no sign of skin cancer recurrence      Open and closed comedones      Inflammatory papules and pustules      Verrucoid papule consistent consistent with wart     Erythematous eczematous patches and plaques     Dystrophic onycholytic nail with subungual debris c/w onychomycosis     Umbilicated papule    Erythematous-base heme-crusted tan verrucoid plaque consistent with inflamed seborrheic keratosis     Erythematous Silvery Scaling Plaque c/w Psoriasis     See annotation      Assessment / Plan:        Multiple benign nevi  Seborrheic keratoses  Lentigo  Milligan angioma  Reassurance given to patient. No treatment is necessary.   Treatment of benign, asymptomatic lesions may be considered cosmetic.    Screening exam for skin cancer  Skin cancer screening  Total body skin examination performed today including at least 12 points as noted in physical examination. No lesions suspicious for malignancy noted.    Recommend daily sun protection/avoidance, use of at least SPF 30, broad spectrum sunscreen (OTC drug), skin self examinations, and routine physician surveillance to optimize early detection             Follow up in about 1 year (around 2/21/2026) for TBSE.

## 2025-02-21 NOTE — PATIENT INSTRUCTIONS
Sunscreen Guidelines  Sunscreen protects your skin by absorbing and reflecting ultraviolet rays. All sunscreens have a sun protection factor (SPF) rating that indicates how long a sunscreen will remain effective on the skin.    Why protect your skin?  The sun's rays are composed of many different wavelengths, including UVA, UVB, and visible light that each affect the skin differently.    UVB: sunburn, photoaging, skin cancer (melanoma, basal cell, and squamous cell carcinomas) and modulation of the skin's immune system.    UVA: similar to above but thought to contribute more to aging; at the same dose of UVB it is less powerful however the sun has 10-20 times the levels of UVA as compared with UVB.  Visible light: implicated in causing unwanted darkening of skin, such as melasma and post-inflammatory hyperpigmentation in darker skin types     If I have dark skin, do I need to worry about the sun?    More darkly pigmented skin is more protected against UV-induced skin cancer, sunburn, and photoaging, though may still suffer from sun-related conditions, including melasma, hyperpigmentation, and other dark spots.    Sun avoidance  As a general rule, stay out of the sun as much as possible between 10 a.m. - 4 p.m.    Download the EPA UV index bree to track the UV index by hour in your zip code.      Which sunscreen should I choose?  The best sunscreen to use varies by individual. The one that feels best on your skin and fits your lifestyle will be the one you will likely use most regularly.   Active ingredients of sunscreen vary by , and may be a chemical (such as avobenzone or oxybenzone) or physical agent (such as zinc oxide or titanium dioxide). I recommend a physical agent.  A water-resistant sunscreen is one that maintains the SPF level after 40 minutes of water exposure. A very water-resistant sunscreen maintains the SPF level after 80 minutes of water exposure.    Sunscreen: this is the last layer in  "sun protection   Be generous: 1 shot glass of sunscreen for your body, ½ teaspoon for your face/neck  Reapply every 2 hours  Broad spectrum (provides UVA/UVB protection), SPF 50 or above  Avoid spray sunscreens: less effective and have been found to contain benzene (carcinogen)    Sun protective clothing  Although sunscreen helps minimize sun damage, no sunscreen completely blocks all wavelengths of UV light. Wearing sun protective clothing such as hats, rashguards or swim shirts, and long sleeves and/or pants, as well as avoiding sun exposure from 10 a.m. to 4 p.m. will help protect your skin from overexposure and minimize sun damage. Seek shade.  Long sleeved clothing, hats, and sunglasses: makes sun protection easier, more effective, and can even be more affordable, since sunscreen needs to be reapplied frequently.    Solumbra (www.sunprectautions.MSDSonline.com)  Avazu Inc (www.Omegawave)  Coolibar (www.Opiatalk.MSDSonline.com)  Land's end (www.Sensoria Inc.)  Hats from Karon pSivida (www.helenkaminski.com)    My Favorite Sunscreens:  Physical blockers: Can have a "white case" but in general are more effective  - Face: CeraVe tinted mineral sunscreen, Bare Minerals complexion rescue (20 shades), Elta MD (UV elements, UV physical, UV restore, etc), Tizo ultra zinc tinted, Cetaphil Sheer Mineral Face Liquid Sunscreen  - Body: Blue Lizard, Neutrogena Sheer Zinc, Eucerin Daily Protection, Aveeno Baby   "

## 2025-02-24 NOTE — TELEPHONE ENCOUNTER
Pt taking probiotics chloe and yogurt but not taking florastor. Asking if she should take that too.   Says diarrhea has slowed

## 2025-02-25 ENCOUNTER — OFFICE VISIT (OUTPATIENT)
Dept: INTERNAL MEDICINE | Facility: CLINIC | Age: 75
End: 2025-02-25
Payer: MEDICARE

## 2025-02-25 VITALS
DIASTOLIC BLOOD PRESSURE: 71 MMHG | BODY MASS INDEX: 21.27 KG/M2 | WEIGHT: 135.5 LBS | TEMPERATURE: 98 F | OXYGEN SATURATION: 100 % | HEIGHT: 67 IN | SYSTOLIC BLOOD PRESSURE: 125 MMHG | RESPIRATION RATE: 20 BRPM | HEART RATE: 73 BPM

## 2025-02-25 DIAGNOSIS — G95.9 DISEASE OF SPINAL CORD, UNSPECIFIED: ICD-10-CM

## 2025-02-25 DIAGNOSIS — I49.5 SSS (SICK SINUS SYNDROME): Chronic | ICD-10-CM

## 2025-02-25 DIAGNOSIS — I70.0 ATHEROSCLEROSIS OF AORTA: ICD-10-CM

## 2025-02-25 DIAGNOSIS — Z00.00 ENCOUNTER FOR MEDICARE ANNUAL WELLNESS EXAM: Primary | ICD-10-CM

## 2025-02-25 DIAGNOSIS — F33.1 MAJOR DEPRESSIVE DISORDER, RECURRENT, MODERATE: ICD-10-CM

## 2025-02-25 DIAGNOSIS — I48.0 PAROXYSMAL ATRIAL FIBRILLATION: ICD-10-CM

## 2025-02-25 DIAGNOSIS — D69.6 THROMBOCYTOPENIA, UNSPECIFIED: ICD-10-CM

## 2025-02-25 DIAGNOSIS — E21.0 PRIMARY HYPERPARATHYROIDISM: ICD-10-CM

## 2025-02-25 DIAGNOSIS — N18.31 STAGE 3A CHRONIC KIDNEY DISEASE: ICD-10-CM

## 2025-02-25 DIAGNOSIS — E78.5 DYSLIPIDEMIA: ICD-10-CM

## 2025-02-25 DIAGNOSIS — I10 ESSENTIAL HYPERTENSION: ICD-10-CM

## 2025-02-25 DIAGNOSIS — D32.9 MENINGIOMA: ICD-10-CM

## 2025-02-25 PROCEDURE — 99999 PR PBB SHADOW E&M-EST. PATIENT-LVL V: CPT | Mod: PBBFAC,,,

## 2025-02-25 NOTE — PATIENT INSTRUCTIONS
Counseling and Referral of Other Preventative  (Italic type indicates deductible and co-insurance are waived)    Patient Name: Manisha Wynne  Today's Date: 2/25/2025    Health Maintenance         Date Due Completion Date    COVID-19 Vaccine (10 - 2024-25 season) 09/01/2024 11/9/2023    Lipid Panel 03/08/2025 3/8/2024    DEXA Scan 08/02/2026 8/2/2024    TETANUS VACCINE 06/04/2031 6/4/2021          No orders of the defined types were placed in this encounter.    The following information is provided to all patients.  This information is to help you find resources for any of the problems found today that may be affecting your health:                  Living healthy guide: www.[facility].[place].gov      Understanding Diabetes: www.diabetes.org      Eating healthy: www.cdc.gov/healthyweight      [facility] home safety checklist: www.cdc.gov/steadi/patient.html      Agency on Aging: www.goea.[place].gov      Alcoholics anonymous (AA): www.aa.org      Physical Activity: www.vani.nih.gov/zr6tcih      Tobacco use: www.quitwithusla.org

## 2025-02-25 NOTE — PROGRESS NOTES
"  Manisha Wynne presented for a follow-up Medicare AWV today. The following components were reviewed and updated:    Medical history  Family History  Social history  Allergies and Current Medications  Health Risk Assessment  Health Maintenance  Care Team    **See Completed Assessments for Annual Wellness visit with in the encounter summary    The following assessments were completed:  Depression Screening  Cognitive function Screening    Timed Get Up Test  Whisper Test      Opioid documentation:      Patient does not have a current opioid prescription.          Vitals:    02/25/25 1008   BP: 125/71   BP Location: Left arm   Patient Position: Sitting   Pulse: 73   Resp: 20   Temp: 97.5 °F (36.4 °C)   SpO2: 100%   Weight: 61.4 kg (135 lb 7.6 oz)   Height: 5' 7" (1.702 m)     Body mass index is 21.22 kg/m².       Physical Exam  Vitals reviewed.   Constitutional:       Appearance: Normal appearance.   HENT:      Head: Normocephalic and atraumatic.   Cardiovascular:      Rate and Rhythm: Normal rate and regular rhythm.      Pulses: Normal pulses.           Radial pulses are 2+ on the right side and 2+ on the left side.        Dorsalis pedis pulses are 2+ on the right side and 2+ on the left side.        Posterior tibial pulses are 2+ on the right side and 2+ on the left side.      Heart sounds: Normal heart sounds.   Pulmonary:      Effort: Pulmonary effort is normal.      Breath sounds: Normal breath sounds.   Musculoskeletal:      Right lower leg: No edema.      Left lower leg: No edema.   Skin:     General: Skin is warm and dry.      Capillary Refill: Capillary refill takes less than 2 seconds.   Neurological:      General: No focal deficit present.      Mental Status: She is alert and oriented to person, place, and time.   Psychiatric:         Mood and Affect: Mood normal.         Behavior: Behavior normal.       Diagnoses and health risks identified today and associated recommendations/orders:    1. Encounter " for Medicare annual wellness exam  Assessment and evaluation performed as stated above  - Ambulatory Referral/Consult to Enhanced Annual Wellness Visit (eAWV)    2. Meningioma  Stable and monitored. F/u with pcp    3. Disease of spinal cord, unspecified  Currently stable.  Pt has upcoming EMG-2 limbs.  Pt to follow up with neurology after emg.  Pt followed by neurology    4. Major depressive disorder, recurrent, moderate  Stable on fluoxetine, bupropion..  Today PHQ-2: 0  F/u with pcp    5. Paroxysmal atrial fibrillation  Stable on asa.  F/u with cards    6. Essential hypertension  Stable on losartan.  F/u with cards    7. SSS (sick sinus syndrome)  Chronic, stable on current regimen.  F/u with cards    8. Stage 3a chronic kidney disease  Stable and protected on losartan.  I recommend avoiding nephrotoxic medications such as NSAIDS (aleve, motrin, naproxen, ibuprofen).  These medications put extra stress on the kidneys.  Increase hydration to keep kidneys operating at an optimal level.  F/u with pcp    9. Thrombocytopenia, unspecified  Stable.  Platelets 216 on 2/10/25.  F/u with pcp    10. Primary hyperparathyroidism  Stable on dietary intake of calcium and vitamin D. Creatinine 1.0, calcium 9.3 on 2/10/25 and vitamin D 51 on 11/8/24.  Bone density performed on 8/2/24 shows osteoporosis; pt currently taking Prolia.  Encouraged pt to incorporated weight bearing exercises into daily activity level.  F/u with pcp    11. Atherosclerosis of aorta  12. Dyslipidemia  Stable on pravastatin.  Encouraged pt to adhere to low fat, low cholesterol level.  F/u with pcp      Provided Manisha with a 5-10 year written screening schedule and personal prevention plan. Recommendations were developed using the USPSTF age appropriate recommendations. Education, counseling, and referrals were provided as needed.  After Visit Summary printed and given to patient which includes a list of additional screenings\tests needed.    Follow up in  about 1 year (around 2/25/2026), or if symptoms worsen or fail to improve.      Natalee Huggins, LETICIA    I offered to discuss advanced care planning, including how to pick a person who would make decisions for you if you were unable to make them for yourself, called a health care power of , and what kind of decisions you might make such as use of life sustaining treatments such as ventilators and tube feeding when faced with a life limiting illness recorded on a living will that they will need to know. (How you want to be cared for as you near the end of your natural life)     X  Patient has advanced directives on file, which we reviewed, and they do not wish to make changes.

## 2025-03-01 NOTE — PROGRESS NOTES
"Individual Psychotherapy (PhD/LCSW)    11/4/2019    Site: [facility]    Therapeutic Intervention: Met with patient alone.  Outpatient - Insight oriented psychotherapy 45 min - CPT code 62559    Chief complaint/reason for encounter: depression, distractibility     Interval history and content of current session: Pt saw her psychiatrist, increased Prozac, feeling "a little better", got her dishes washed, cleared out some mail, doing better at work. 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 Pt notes she wakes up early but stays in bed with phone and ipad and doesn't get to work until 1 or 2, then comes home tired at 8 and doesn't finish her notes, but stays up late again with electronics. Discuss difficulty taking control of her behavior and establishing routine. Encourage pt to shift from hoping for change to recognizing that she is in charge of her behavior.    Treatment plan:  · Target symptoms: depression, distractibility  · Why chosen therapy is appropriate versus another modality: relevant to diagnosis  · Outcome monitoring methods: self-report  · Therapeutic intervention type: insight oriented psychotherapy    Risk parameters:  Patient reports no suicidal ideation  Patient reports no homicidal ideation  Patient reports no self-injurious behavior  Patient reports no violent behavior    Verbal deficits: None    Patient's response to intervention:  The patient's response to intervention is motivated    Progress toward goals and other mental status changes:  The patient's progress toward goals is fair    Diagnosis: Major depression, recurrent, in partial remission,  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 97.6

## 2025-03-18 ENCOUNTER — INFUSION (OUTPATIENT)
Dept: INFECTIOUS DISEASES | Facility: HOSPITAL | Age: 75
End: 2025-03-18
Payer: MEDICARE

## 2025-03-18 VITALS
SYSTOLIC BLOOD PRESSURE: 179 MMHG | DIASTOLIC BLOOD PRESSURE: 72 MMHG | TEMPERATURE: 98 F | RESPIRATION RATE: 19 BRPM | OXYGEN SATURATION: 100 % | HEIGHT: 68 IN | HEART RATE: 64 BPM | BODY MASS INDEX: 20.67 KG/M2 | WEIGHT: 136.38 LBS

## 2025-03-18 DIAGNOSIS — M81.8 OTHER OSTEOPOROSIS WITHOUT CURRENT PATHOLOGICAL FRACTURE: ICD-10-CM

## 2025-03-18 DIAGNOSIS — N18.31 STAGE 3A CHRONIC KIDNEY DISEASE: Primary | ICD-10-CM

## 2025-03-18 PROCEDURE — 63600175 PHARM REV CODE 636 W HCPCS: Mod: JZ,TB | Performed by: INTERNAL MEDICINE

## 2025-03-18 PROCEDURE — 96372 THER/PROPH/DIAG INJ SC/IM: CPT

## 2025-03-18 RX ADMIN — DENOSUMAB 60 MG: 60 INJECTION SUBCUTANEOUS at 04:03

## 2025-03-18 NOTE — PROGRESS NOTES
Pt received Prolia injection in left arm. Pt currently taking Vit D/Ca+; Pt denies any dental sx in last 3 months;

## 2025-03-24 ENCOUNTER — E-VISIT (OUTPATIENT)
Dept: UROGYNECOLOGY | Facility: CLINIC | Age: 75
End: 2025-03-24
Payer: MEDICARE

## 2025-03-24 DIAGNOSIS — N39.46 MIXED INCONTINENCE URGE AND STRESS: Primary | ICD-10-CM

## 2025-03-24 NOTE — PROGRESS NOTES
Patient ID: Manisha Wynne is a 74 y.o. female.    Chief Complaint: General Illness (Entered automatically based on patient selection in [email or web address].)    The patient initiated a request through [email or web address] on 3/24/2025 for evaluation and management with a chief complaint of General Illness (Entered automatically based on patient selection in [email or web address].)     I evaluated the questionnaire submission on 03/24/2025 and will cancel it. Will handle via [employer] messaging.    [facility]    3/24/2025  4:07 PM CDT - Filed by Patient   What do you need help with? Urinary Problems   Do you agree to participate in an E-Visit? Yes   If you have any of the following symptoms, please go to the nearest emergency room or call 911: I acknowledge   What is the main issue you would like addressed today? My night leakage is now requiring me to wear Depend-like underwear all the time. When I have to urinate, I cant stop it.   Do you have any of the following symptoms? Passing urine more frequently;  Discharge in urine   When did your skin concern begin? 2/4/2025   What medications or treatments have you used to help your symptoms? Cranberry products;  Drinking more fluids   What does your urine look like? Clear   Have you had any of the following symptoms during the past 24 hours?   Urgency (a sudden and uncontrollable urge to urinate) Severe   Frequency (going to the toilet very often) Severe   Burning pain when urinating None   Incomplete bladder emptying (still feel like you need to urinate again after urination) (None, Mild, Moderate, Severe) Moderate   Pain in the lower belly when youre not urinating. None   Discomfort from your urinary symptoms. Moderate   Have you had any of the following symptoms during the past 24 hours?   Blood seen in the urine None   Pain in the lower back on one or both sides (flank pain) Mild   Abnormal Vaginal Discharge (abnormal amount, color and/or odor) None   Discharge  from the opening you urinate from (urethra) when not urinating. None   Have your symptoms interfered with your every day activities? Severe   Do you have a fever? No   Are you a diabetic? No   Are you currently experiencing any of the following while completing this questionnaire? None   Do you have a history of kidney stones? No   Provide any additional information you feel is important. I need an order for Gemtesa. You were helpful in my getting an initial 90-day. But it cost $743 when i tried to renew. I got a GoodRx clearance but after talking to [employer], i need another thpe of clearance.   Please attach any relevant images or files (if you have performed a home test for UTI, please submit a photo of results)    Are you able to take your vital signs? Yes   Systolic Blood Pressure: 148   Diastolic Blood Pressure: 69   Weight: 137   Height: 68   Pulse: 68   Temperature: 98   Respiration rate:    Pulse Oxygen: 98         No diagnosis found.     No orders of the defined types were placed in this encounter.           No follow-ups on file.      E-Visit Time Tracking:

## 2025-03-24 NOTE — Clinical Note
Can one of you see if this amount is her deductible, or if we need to do a pa, tier reduction, or appeal?  Thanks, Jessica

## 2025-03-26 ENCOUNTER — TELEPHONE (OUTPATIENT)
Dept: UROGYNECOLOGY | Facility: CLINIC | Age: 75
End: 2025-03-26
Payer: MEDICARE

## 2025-03-26 NOTE — TELEPHONE ENCOUNTER
Contacted patient to inform tier reduction completed. As for deductible, informed patient need to call insurance to have it put on payment plan. Patient verbalized understanding and call ended.

## 2025-04-01 ENCOUNTER — CLINICAL SUPPORT (OUTPATIENT)
Dept: CARDIOLOGY | Facility: HOSPITAL | Age: 75
End: 2025-04-01
Payer: MEDICARE

## 2025-04-01 ENCOUNTER — CLINICAL SUPPORT (OUTPATIENT)
Dept: CARDIOLOGY | Facility: HOSPITAL | Age: 75
End: 2025-04-01
Attending: INTERNAL MEDICINE
Payer: MEDICARE

## 2025-04-01 DIAGNOSIS — R00.1 BRADYCARDIA, UNSPECIFIED: ICD-10-CM

## 2025-04-01 DIAGNOSIS — Z95.0 PRESENCE OF CARDIAC PACEMAKER: ICD-10-CM

## 2025-04-01 PROCEDURE — 93296 REM INTERROG EVL PM/IDS: CPT | Mod: HCNC | Performed by: INTERNAL MEDICINE

## 2025-04-01 PROCEDURE — 93294 REM INTERROG EVL PM/LDLS PM: CPT | Mod: HCNC,,, | Performed by: INTERNAL MEDICINE

## 2025-04-08 ENCOUNTER — OFFICE VISIT (OUTPATIENT)
Dept: PODIATRY | Facility: CLINIC | Age: 75
End: 2025-04-08
Payer: MEDICARE

## 2025-04-08 ENCOUNTER — E-VISIT (OUTPATIENT)
Dept: INTERNAL MEDICINE | Facility: CLINIC | Age: 75
End: 2025-04-08
Payer: MEDICARE

## 2025-04-08 ENCOUNTER — PROCEDURE VISIT (OUTPATIENT)
Dept: NEUROLOGY | Facility: CLINIC | Age: 75
End: 2025-04-08
Payer: MEDICARE

## 2025-04-08 ENCOUNTER — TELEPHONE (OUTPATIENT)
Dept: INTERNAL MEDICINE | Facility: CLINIC | Age: 75
End: 2025-04-08

## 2025-04-08 VITALS
HEART RATE: 60 BPM | DIASTOLIC BLOOD PRESSURE: 71 MMHG | BODY MASS INDEX: 20.41 KG/M2 | WEIGHT: 134.69 LBS | HEIGHT: 68 IN | SYSTOLIC BLOOD PRESSURE: 145 MMHG

## 2025-04-08 DIAGNOSIS — M79.672 LEFT FOOT PAIN: ICD-10-CM

## 2025-04-08 DIAGNOSIS — M54.16 LUMBAR RADICULOPATHY, CHRONIC: ICD-10-CM

## 2025-04-08 DIAGNOSIS — Z23 NEED FOR MEASLES-MUMPS-RUBELLA (MMR) VACCINE: Primary | ICD-10-CM

## 2025-04-08 DIAGNOSIS — M54.16 LUMBAR RADICULOPATHY: ICD-10-CM

## 2025-04-08 DIAGNOSIS — M20.41 HAMMER TOES OF BOTH FEET: Primary | ICD-10-CM

## 2025-04-08 DIAGNOSIS — M20.42 HAMMER TOES OF BOTH FEET: Primary | ICD-10-CM

## 2025-04-08 DIAGNOSIS — G62.9 NEUROPATHY: ICD-10-CM

## 2025-04-08 PROCEDURE — 95910 NRV CNDJ TEST 7-8 STUDIES: CPT | Mod: S$GLB,,, | Performed by: PSYCHIATRY & NEUROLOGY

## 2025-04-08 PROCEDURE — 3078F DIAST BP <80 MM HG: CPT | Mod: HCNC,CPTII,S$GLB, | Performed by: PODIATRIST

## 2025-04-08 PROCEDURE — 99214 OFFICE O/P EST MOD 30 MIN: CPT | Mod: HCNC,S$GLB,, | Performed by: PODIATRIST

## 2025-04-08 PROCEDURE — 99999 PR PBB SHADOW E&M-EST. PATIENT-LVL III: CPT | Mod: PBBFAC,HCNC,, | Performed by: PODIATRIST

## 2025-04-08 PROCEDURE — 1101F PT FALLS ASSESS-DOCD LE1/YR: CPT | Mod: HCNC,CPTII,S$GLB, | Performed by: PODIATRIST

## 2025-04-08 PROCEDURE — 1157F ADVNC CARE PLAN IN RCRD: CPT | Mod: HCNC,CPTII,S$GLB, | Performed by: PODIATRIST

## 2025-04-08 PROCEDURE — 1125F AMNT PAIN NOTED PAIN PRSNT: CPT | Mod: HCNC,CPTII,S$GLB, | Performed by: PODIATRIST

## 2025-04-08 PROCEDURE — 99213 OFFICE O/P EST LOW 20 MIN: CPT | Mod: 25,S$GLB,, | Performed by: PSYCHIATRY & NEUROLOGY

## 2025-04-08 PROCEDURE — 95886 MUSC TEST DONE W/N TEST COMP: CPT | Mod: S$GLB,,, | Performed by: PSYCHIATRY & NEUROLOGY

## 2025-04-08 PROCEDURE — 3077F SYST BP >= 140 MM HG: CPT | Mod: HCNC,CPTII,S$GLB, | Performed by: PODIATRIST

## 2025-04-08 PROCEDURE — 3288F FALL RISK ASSESSMENT DOCD: CPT | Mod: HCNC,CPTII,S$GLB, | Performed by: PODIATRIST

## 2025-04-08 NOTE — PROGRESS NOTES
Subjective:      Patient ID: Manisha Wynne is a 74 y.o. female.    Chief Complaint:   Hammer Toe (Left foot 2nd,3rd, 4th digit toe) and Toe Pain (Left foot 2nd,3rd, 4th digit hurts really bad when stepping and feels burning and throbbing)    Manisha is a 74 y.o. female who presents to the clinic for evaluation and treatment of feet  Manisha has a past medical history of Abnormal Pap smear, Atrial fibrillation, AV block, 1st degree (07/25/2012), C. difficile diarrhea, Cataract, Depression (07/24/2012), Facet arthritis of lumbar region (03/31/2015), Falls, Hyperlipidemia, Hypertension (07/24/2012), Neuropathy, Other specified cardiac dysrhythmias(427.89), Sleep apnea, Syncope (07/24/2012), and Tremors of nervous system.  .  This patient has documented high risk feet requiring routine maintenance secondary to peripheral neuropathy.    Patient returns to clinic for foot evaluation/c/o pain  Doing very well after hammertoe/study procedures right foot.  She would like to discuss similar procedure to the left side.     PCP: Iris Blue MD    Date Last Seen by PCP 5/3/24    Current shoe gear:  Affected Foot : bacilio Snyder        Hemoglobin A1C   Date Value Ref Range Status   11/16/2023 5.5 4.0 - 5.6 % Final     Comment:     ADA Screening Guidelines:  5.7-6.4%  Consistent with prediabetes  >or=6.5%  Consistent with diabetes    High levels of fetal hemoglobin interfere with the HbA1C  assay. Heterozygous hemoglobin variants (HbS, HgC, etc)do  not significantly interfere with this assay.   However, presence of multiple variants may affect accuracy.     08/05/2022 5.3 4.0 - 5.6 % Final     Comment:     ADA Screening Guidelines:  5.7-6.4%  Consistent with prediabetes  >or=6.5%  Consistent with diabetes    High levels of fetal hemoglobin interfere with the HbA1C  assay. Heterozygous hemoglobin variants (HbS, HgC, etc)do  not significantly interfere with this assay.   However, presence of multiple variants may  affect accuracy.     06/10/2021 5.4 4.0 - 5.6 % Final     Comment:     ADA Screening Guidelines:  5.7-6.4%  Consistent with prediabetes  >or=6.5%  Consistent with diabetes    High levels of fetal hemoglobin interfere with the HbA1C  assay. Heterozygous hemoglobin variants (HbS, HgC, etc)do  not significantly interfere with this assay.   However, presence of multiple variants may affect accuracy.     06/10/2021 5.4 4.0 - 5.6 % Final     Comment:     ADA Screening Guidelines:  5.7-6.4%  Consistent with prediabetes  >or=6.5%  Consistent with diabetes    High levels of fetal hemoglobin interfere with the HbA1C  assay. Heterozygous hemoglobin variants (HbS, HgC, etc)do  not significantly interfere with this assay.   However, presence of multiple variants may affect accuracy.          Past Medical History:   Diagnosis Date    Abnormal Pap smear     Atrial fibrillation     AV block, 1st degree 07/25/2012    C. difficile diarrhea     RESOLVED    Cataract     Depression 07/24/2012    Facet arthritis of lumbar region 03/31/2015    Falls     3 falls in the last 6 mos--noted 6/19/19    Hyperlipidemia     Hypertension 07/24/2012    Neuropathy     Other specified cardiac dysrhythmias(427.89)     Sleep apnea     Syncope 07/24/2012    Tremors of nervous system     hands bilaterally     Past Surgical History:   Procedure Laterality Date    APPLICATION OF CARTILAGE GRAFT Bilateral 12/19/2019    Procedure: APPLICATION, CARTILAGE GRAFT AURICULAR JEZ;  Surgeon: Martinez Flores III, MD;  Location: [facility];  Service: ENT;  Laterality: Bilateral;    CARDIAC PACEMAKER PLACEMENT  09/07/2012    [id number]    CATARACT EXTRACTION W/  INTRAOCULAR LENS IMPLANT Right 5/16/2022    Procedure: EXTRACTION, CATARACT, WITH IOL INSERTION;  Surgeon: Ines Aguilera MD;  Location: [facility] OR;  Service: Ophthalmology;  Laterality: Right;  Catalys     CATARACT EXTRACTION W/  INTRAOCULAR LENS IMPLANT Left 6/20/2022    Procedure: EXTRACTION,  CATARACT, WITH IOL INSERTION;  Surgeon: Ines Aguilera MD;  Location: [facility];  Service: Ophthalmology;  Laterality: Left;  Catalys     DILATION AND CURETTAGE OF UTERUS      Hx of precancerous cells?    ENDOSCOPIC ULTRASOUND OF UPPER GASTROINTESTINAL TRACT N/A 7/5/2019    Procedure: ULTRASOUND, UPPER GI TRACT, ENDOSCOPIC;  Surgeon: Kev Calderon MD;  Location: [facility] ENDO (2ND FLR);  Service: Endoscopy;  Laterality: N/A;  Pacemaker-Adam   appt confirmed-rb    MYELOGRAPHY N/A 5/4/2021    Procedure: Myelogram  CERVICAL, THORACIC AND LUMBAR;  Surgeon: [facility] Diagnostic Provider;  Location: [facility] 2ND FLR;  Service: Radiology;  Laterality: N/A;    NASAL SEPTOPLASTY N/A 12/19/2019    Procedure: SEPTOPLASTY, NOSE;  Surgeon: Martinez Flores III, MD;  Location: [facility];  Service: ENT;  Laterality: N/A;  FOLLOW DR SALVATORE KIMBROUGH PROTOCOL    OPEN REDUCTION AND INTERNAL FIXATION (ORIF) OF FRACTURE OF DISTAL RADIUS Left 1/24/2020    Procedure: ORIF, FRACTURE, RADIUS, DISTAL-left;  Surgeon: Ellen Moe MD;  Location: [facility] OR;  Service: Orthopedics;  Laterality: Left;    OPEN REDUCTION AND INTERNAL FIXATION (ORIF) OF INJURY OF ELBOW Right 2/28/2024    Procedure: ORIF, ELBOW;  Surgeon: Ellen Moe MD;  Location: [facility];  Service: Orthopedics;  Laterality: Right;    POSTERIOR FUSION OF CERVICAL SPINE WITH LAMINECTOMY N/A 11/14/2022    Procedure: LAMINECTOMY, SPINE, CERVICAL, WITH POSTERIOR FUSION C3-C6;  Surgeon: Nicolette Cheek MD;  Location: [facility] 2ND FLR;  Service: Neurosurgery;  Laterality: N/A;  TORONTO III, ASA III, BLOOD TYPE AND SCREEN, NEUROMONITORING EMG SEP MEP, BRACE/HALO [place], BED REGULAR BED, HEADREST Walker, POSITION PRONE, SPECIAL EQUIPMENT NIKI MIDDLETON, RADIOLOGY C-ARM, EXT. BONE STIMULATOR BIOMET    REPLACEMENT OF PACEMAKER GENERATOR Left 10/7/2022    Procedure: REPLACEMENT, PACEMAKER GENERATOR;  Surgeon: John Sheets MD;  Location: [facility] EP LAB;  Service: Cardiology;  Laterality:  Left;  YAS, SSS, AVB, Dual PPM Gen Change,SJM, MAC ,MD, 3 prep,** Adam dcPPM in situ**    SURGICAL REMOVAL OF NASAL TURBINATE Bilateral 12/19/2019    Procedure: EXCISION, NASAL TURBINATE;  Surgeon: Martinez Flores III, MD;  Location: [facility];  Service: ENT;  Laterality: Bilateral;    TONSILLECTOMY      WISDOM TOOTH EXTRACTION       Current Outpatient Medications on File Prior to Visit   Medication Sig Dispense Refill    acetaminophen (TYLENOL) 500 MG tablet Take 2 tablets (1,000 mg total) by mouth 2 (two) times daily as needed for Pain. Begin post op day 3. 50 tablet 0    buPROPion (WELLBUTRIN XL) 300 MG 24 hr tablet Take 1 tablet by mouth once daily.      estradioL (ESTRACE) 0.01 % (0.1 mg/gram) vaginal cream Use one gram nightly x 2 weeks then twice weekly 42.5 g 3    FLUoxetine 40 MG capsule Take 40 mg by mouth.      lisdexamfetamine (VYVANSE) 70 MG capsule Take 70 mg by mouth every morning.      losartan (COZAAR) 25 MG tablet TAKE 1 TABLET ONE TIME DAILY 90 tablet 3    melatonin (MELATIN) 5 mg Take 1 tablet by mouth every evening. (For insomnia)      methocarbamoL (ROBAXIN) 500 MG Tab One - two daily as needed for back pain 40 tablet 0    ondansetron (ZOFRAN-ODT) 4 MG TbDL Take 1 tablet (4 mg total) by mouth every 8 (eight) hours as needed (every 8-12 hrs prn nausea). 15 tablet 0    pravastatin (PRAVACHOL) 40 MG tablet TAKE 1 TABLET ONE TIME DAILY 90 tablet 1    PROLIA 60 mg/mL Syrg Inject 60 mg into the skin every 6 (six) months.      propranoloL (INDERAL) 20 MG tablet TAKE 1 TABLET TWICE DAILY 180 tablet 1    vibegron (GEMTESA) 75 mg Tab Take 1 tablet (75 mg total) by mouth Daily. 30 tablet 11    aspirin (ECOTRIN) 81 MG EC tablet Take 1 tablet (81 mg total) by mouth 2 (two) times a day. 60 tablet 0     No current facility-administered medications on file prior to visit.     Review of patient's allergies indicates:  No Known Allergies    Review of Systems   Constitutional: Negative for chills, decreased  "appetite, fever, malaise/fatigue, night sweats, weight gain and weight loss.   Eyes:         Poor eyesight   Cardiovascular:  Negative for chest pain, claudication, dyspnea on exertion, leg swelling, palpitations and syncope.   Respiratory:  Negative for cough and shortness of breath.    Endocrine: Negative for cold intolerance and heat intolerance.   Hematologic/Lymphatic: Negative for bleeding problem. Does not bruise/bleed easily.   Skin:  Positive for nail changes. Negative for color change, dry skin, flushing, itching, poor wound healing, rash, skin cancer, suspicious lesions and unusual hair distribution.   Musculoskeletal:  Positive for arthritis, falls and stiffness. Negative for back pain, gout, joint pain, joint swelling, muscle cramps, muscle weakness, myalgias and neck pain.   Gastrointestinal:  Negative for diarrhea, nausea and vomiting.   Neurological:  Positive for numbness and paresthesias. Negative for dizziness, focal weakness, light-headedness, tremors, vertigo and weakness.   Psychiatric/Behavioral:  Negative for altered mental status and depression. The patient does not have insomnia.    Allergic/Immunologic: Negative.            Objective:       Vitals:    04/08/25 1129   BP: (!) 145/71   Pulse: 60   Weight: 61.1 kg (134 lb 11.2 oz)   Height: 5' 8" (1.727 m)   PainSc:   8   PainLoc: Toe   61.1 kg (134 lb 11.2 oz) afeb    Physical Exam  Vitals reviewed.   Constitutional:       General: She is not in acute distress.     Appearance: She is well-developed. She is not ill-appearing, toxic-appearing or diaphoretic.      Comments: Proper supportive shoegear   Cardiovascular:      Pulses:           Dorsalis pedis pulses are 2+ on the right side and 2+ on the left side.        Posterior tibial pulses are 1+ on the right side and 1+ on the left side.      Comments: No edema to digits  Musculoskeletal:         General: Deformity present.      Right lower leg: No edema.      Left lower leg: No edema.      " Right foot: Decreased range of motion. Deformity and prominent metatarsal heads present. No tenderness or bony tenderness.      Left foot: Decreased range of motion. Deformity and prominent metatarsal heads present. No tenderness or bony tenderness.      Comments:    Semi- Reducible extensor and flexor contractures at the MTPJ and PIPJ of toes 2-5, left foot.  Much improved right foot.  Much more rectus at this time to the right side.  Tenderness noted to the dorsal and distal aspects of the left 2nd 3rd 4th digits.    No pop      Feet:      Right foot:      Protective Sensation: 10 sites tested.  0 sites sensed.      Skin integrity: No ulcer, blister, skin breakdown, erythema, warmth, callus or dry skin.      Toenail Condition: Right toenails are abnormally thick.      Left foot:      Protective Sensation: 10 sites tested.  0 sites sensed.      Skin integrity: Skin breakdown present. No blister, erythema, warmth, callus or dry skin.      Toenail Condition: Left toenails are abnormally thick.      Comments:  No soi      hpk right, 2nd and 3rd toe subungal distal   Pre-ulcer          Skin:     General: Skin is warm and dry.      Capillary Refill: Capillary refill takes 2 to 3 seconds.      Coloration: Skin is not pale.      Findings: No erythema or rash.      Nails: There is clubbing.      Comments:          Neurological:      Mental Status: She is alert and oriented to person, place, and time.      Sensory: Sensory deficit present.      Motor: Atrophy present.      Gait: Gait abnormal.   Psychiatric:         Attention and Perception: Attention normal.         Mood and Affect: Mood normal.         Speech: Speech normal.         Thought Content: Thought content normal.         Cognition and Memory: Cognition normal.         Judgment: Judgment normal.                    Assessment:       Encounter Diagnoses   Name Primary?    Hammer toes of both feet Yes    Left foot pain     Lumbar radiculopathy                             Plan:       Manisha was seen today for hammer toe and toe pain.    Diagnoses and all orders for this visit:    Hammer toes of both feet    Left foot pain    Lumbar radiculopathy          I counseled the patient on her conditions, their implications and medical management.    Conservative and surgical options discussed in detail for hammertoe deformity.    Discussed options for peripheral neuropathy/nerve entrapment syndrome including nerve block therapy, surgical nerve entrapment decompression procedures, and various vitamins and supplementation available shown to improve nerve function.    Follow-up for probable flexor tenotomy left foot 2 3 and 4.

## 2025-04-08 NOTE — PROCEDURES
EMG W/ ULTRASOUND AND NERVE CONDUCTION TEST 2 Extremities    Date/Time: 2025 1:00 PM    Performed by: Sammy Felipe MD  Authorized by: Donald Daly MD                                                                [facility] [address] Neurology    Subjective:       Patient ID: Manisha Wynne is a 74 y.o. female here for a EMG focused evaluation for feet numbness and falls. Previous visits and diagnostic evaluation has been reviewed.  Patient with a history of previous neck surgery describes frequent falls.  She also describes several years of progressive bilateral feet numbness.  Family history is unknown.  She was adopted.  Symptoms have been progressively worsening.   HPI  Review of patient's allergies indicates:  No Known Allergies   There were no vitals filed for this visit.   Chief Complaint: No chief complaint on file.    Past Medical History:   Diagnosis Date    Abnormal Pap smear     Atrial fibrillation     AV block, 1st degree 2012    C. difficile diarrhea     RESOLVED    Cataract     Depression 2012    Facet arthritis of lumbar region 2015    Falls     3 falls in the last 6 mos--noted 19    Hyperlipidemia     Hypertension 2012    Neuropathy     Other specified cardiac dysrhythmias(427.89)     Sleep apnea     Syncope 2012    Tremors of nervous system     hands bilaterally      Social History     Socioeconomic History    Marital status:    Occupational History    Occupation: /Rabbi     Employer: [employer]   Tobacco Use    Smoking status: Former     Current packs/day: 0.00     Average packs/day: 0.3 packs/day for 15.0 years (3.8 ttl pk-yrs)     Types: Cigarettes     Start date: 1967     Quit date: 1982     Years since quittin.7    Smokeless tobacco: Former   Substance and Sexual Activity    Alcohol use: Yes     Comment: no daily or heavy use, ~4 times per month    Drug use: No    Sexual  activity: Not Currently     Partners: Male     Social Drivers of Health     Financial Resource Strain: Low Risk  (2/20/2025)    Overall Financial Resource Strain (CARDIA)     Difficulty of Paying Living Expenses: Not hard at all   Food Insecurity: No Food Insecurity (2/20/2025)    Hunger Vital Sign     Worried About Running Out of Food in the Last Year: Never true     Ran Out of Food in the Last Year: Never true   Transportation Needs: No Transportation Needs (2/20/2025)    PRAPARE - Transportation     Lack of Transportation (Medical): No     Lack of Transportation (Non-Medical): No   Physical Activity: Insufficiently Active (2/20/2025)    Exercise Vital Sign     Days of Exercise per Week: 1 day     Minutes of Exercise per Session: 40 min   Stress: Stress Concern Present (2/20/2025)    [employer] [place] [employer] - Occupational Stress Questionnaire     Feeling of Stress : To some extent   Housing Stability: Low Risk  (2/20/2025)    Housing Stability Vital Sign     Unable to Pay for Housing in the Last Year: No     Homeless in the Last Year: No            Objective:      Physical Exam    Constitutional: Patient appears well-nourished.   Head: Normocephalic and atraumatic.   Mouth/Throat: Oropharynx is clear and moist.   Pulmonary/Chest: Effort normal.   Abdominal: Soft.   Skin: Skin is warm and dry.      General:  Patient is alert and cooperative.  Affect:  Patient is appropriate to surroundings and environment.  Language:  Speech is fluent.  HEENT:  There are no outward signs of trauma to head or face.  Cranial Nerves:  Pupils are equal round and reactive to light. Extra-ocular movements are intact. Face, tongue, and palate are  symmetrical.  Motor:  Patient exhibits normal strength testing in bilateral proximal and distal lower extremities.  Increased tone in bilateral lower extremities.  Reflexes:   Reflexes are hyperreflexic in the arms and knees with crossed adductor responses.  Trace ankle  reflexes.  Gait:  Ambulation is independent without use of cane or walker without signs of ataxia or circumduction.  Cerebellar:  No evidence of dysmetria.  Sensory:  Intact to sensory modalities tested.  Musculoskeletal:   Hammertoes present.  Assessment:       We reviewed and discussed at length results of EMG of bilateral lower extremities performed today revealing evidence of an axonal sensorimotor polyneuropathy of unclear etiology.  Also noted was evidence of chronic left L5 and bilateral S1 radiculopathies. These findings are available via media section of chart review.   1. Neuropathy    2. Lumbar radiculopathy, chronic              Plan:       We discussed treatment options at length. Recommend patient keep appointment with referring provider.         I spent a total of 35 minutes on the day of the visit. This includes face to face time and non-face to face time preparing to see the patient (eg, review of tests), obtaining and/or reviewing separately obtained history, documenting clinical information in the electronic or other health record, independently interpreting results and communicating results to the patient/family/caregiver, or care coordinator.    Sammy Feilpe MD, FAAN   04/08/2025   2:12 PM       A dictation device was used to produce this document. Use of such devices sometimes results in grammatical errors or replacement of words that sound similarly.

## 2025-04-08 NOTE — PROGRESS NOTES
Subjective:   Patient ID:  Manisha Wynne is a 74 y.o. female who presents for follow-up of Pacemaker Check  .   Primary Care Physician: Iris Blue MD    Prior Hx:  I had the pleasure of seeing Ms. Wynne in our electrophysiology clinic in follow-up for her pacemaker. As you are aware she is a pleasant 74 year-old woman with sick sinus syndrome, syncope and high-grade infrahisian block s/p dcPPM (Adam, 9/2012) by Dr. Whelan, hypertension, and CEDRIC here for follow-up.     She saw Svetlana Miner in clinic 5/2019. She reported she had recently experienced a fall while in [place] for a conference. She dislocated her finger, but head CT was negative. She was feeling fatigued and lost her balance as she stepped off the curb. She did not lose consciousness.  Device interrogation showed no arrhythmic episodes during period of her fall. Her last ECHO 1/2019 noted normal LVEF.    10/2019: She reports between January and May she had 3 falls. No syncope. She reports she has a sensory neuropathy and is falling with Neurology for this.     10/12/2020: She did have a mechanical fall in late January and broke her wrist. She was in a clinical trial for a balance device at [facility] but this has ended. Ms. Wynne is doing well from a device perspective with stable lead and device function. No RV pacing. No sustained arrhythmia noted. She is on the digital HTN program.  Continues to work with neurology on her balance issues. Otherwise she feels well.     10/2021: Saw Svetlana Miner. Ms. Wynne reports no chest pain with exertion or at rest, palpitations, SOB, SHEPARD, dizziness, or syncope. She is in the [facility] balance program. Has Apple Watch. No recent falls. She was supposed to have spinal surgery. Canceled due to c diff. Got it again and is on oral vanc.     10/2022: PPM gen change    1/2024: Mrs. Wynne returned for follow-up. Having rare device detected AF (longest episode 1 hour in August 2023,  asymptomatic, burden <1%). In-clinic PPM check notes 70% RA and 1% RV pacing, battery longevity 8.8-9.7 years. She still has falls (neuropathy in her feet). She also wakes up with what sounds like apneic episodes. She has CEDRIC but does not use her CPAP.    Interim Hx:  Mrs. Doshi returns for follow-up. Feel swell. Last year, fell and broke her elbow requiring surgery last year.    My interpretation of today's in-clinic PPM check is stable lead parameters with 67% RA and 3.7% RV pacing with very rare AF (burden <1%, longest duration 40 minutes). Battery longevity ~8 years.    My interpretation of today's in-clinic ECG is A-paced rhythm with RBBB and a PVC        Review of Systems   Constitutional: Negative for malaise/fatigue.   Cardiovascular:  Negative for dyspnea on exertion, irregular heartbeat and leg swelling.   Hematologic/Lymphatic: Negative for bleeding problem.   Skin:  Negative for rash.   Musculoskeletal:  Positive for falls. Negative for myalgias.   Gastrointestinal:  Negative for hematemesis, hematochezia and nausea.   Genitourinary:  Negative for hematuria.   Neurological:  Positive for paresthesias. Negative for light-headedness.   Psychiatric/Behavioral:  Negative for altered mental status.    Allergic/Immunologic: Negative for persistent infections.     Objective:        Physical Exam  Vitals reviewed.   Constitutional:       General: She is not in acute distress.     Appearance: Normal appearance. She is well-developed. She is not diaphoretic.   HENT:      Head: Normocephalic and atraumatic.   Eyes:      General:         Right eye: No discharge.         Left eye: No discharge.      Conjunctiva/sclera: Conjunctivae normal.   Cardiovascular:      Rate and Rhythm: Normal rate and regular rhythm.      Heart sounds: No murmur heard.     No friction rub. No gallop.   Pulmonary:      Effort: Pulmonary effort is normal. No respiratory distress.      Breath sounds: Normal breath sounds. No wheezing or  rales.   Abdominal:      General: Bowel sounds are normal. There is no distension.      Palpations: Abdomen is soft.      Tenderness: There is no abdominal tenderness.   Musculoskeletal:      Cervical back: Neck supple.   Skin:     General: Skin is warm and dry.   Neurological:      Mental Status: She is alert and oriented to person, place, and time. Mental status is at baseline.   Psychiatric:         Mood and Affect: Mood normal.         Behavior: Behavior normal.         Thought Content: Thought content normal.         Judgment: Judgment normal.           Assessment:     1. SSS (sick sinus syndrome)    2. RBBB    3. Essential hypertension    4. Paroxysmal atrial fibrillation    5. Atherosclerosis of aorta    6. Stage 3a chronic kidney disease      Plan:     In summary, Ms. Wynne is a pleasant 74 year-old female sick sinus syndrome, syncope and high-grade infrahisian block s/p dcPPM (9/2012) s/p gen change in 2022, hypertension, asymptomatic device detected AF and CEDRIC here for follow-up. Longest AF episode observed was 1 hour. Asymptomatic. [place] is <1%. Would not start OAC at this point. She is a fall risk. Continue monitoring. If burden increases significantly or begins having long episodes (certainly over 6 hours) then will need to reconsider.     Thank you for allowing me to participate in the care of this patient. Please do not hesitate to call me with any questions or concerns.    John Sheets MD, PhD  Cardiac Electrophysiology          HPI

## 2025-04-09 ENCOUNTER — HOSPITAL ENCOUNTER (OUTPATIENT)
Dept: CARDIOLOGY | Facility: CLINIC | Age: 75
Discharge: HOME | End: 2025-04-09
Payer: MEDICARE

## 2025-04-09 ENCOUNTER — CLINICAL SUPPORT (OUTPATIENT)
Dept: CARDIOLOGY | Facility: HOSPITAL | Age: 75
End: 2025-04-09
Attending: INTERNAL MEDICINE
Payer: MEDICARE

## 2025-04-09 ENCOUNTER — OFFICE VISIT (OUTPATIENT)
Dept: ELECTROPHYSIOLOGY | Facility: CLINIC | Age: 75
End: 2025-04-09
Payer: MEDICARE

## 2025-04-09 VITALS
HEIGHT: 68 IN | WEIGHT: 134 LBS | SYSTOLIC BLOOD PRESSURE: 161 MMHG | BODY MASS INDEX: 20.31 KG/M2 | HEART RATE: 65 BPM | DIASTOLIC BLOOD PRESSURE: 74 MMHG

## 2025-04-09 DIAGNOSIS — N18.31 STAGE 3A CHRONIC KIDNEY DISEASE: ICD-10-CM

## 2025-04-09 DIAGNOSIS — I48.0 PAROXYSMAL ATRIAL FIBRILLATION: ICD-10-CM

## 2025-04-09 DIAGNOSIS — I10 ESSENTIAL HYPERTENSION: ICD-10-CM

## 2025-04-09 DIAGNOSIS — I49.5 SSS (SICK SINUS SYNDROME): ICD-10-CM

## 2025-04-09 DIAGNOSIS — I49.5 SSS (SICK SINUS SYNDROME): Primary | Chronic | ICD-10-CM

## 2025-04-09 DIAGNOSIS — I70.0 ATHEROSCLEROSIS OF AORTA: ICD-10-CM

## 2025-04-09 DIAGNOSIS — I45.10 RBBB: ICD-10-CM

## 2025-04-09 LAB
OHS QRS DURATION: 128 MS
OHS QTC CALCULATION: 449 MS

## 2025-04-09 PROCEDURE — 1160F RVW MEDS BY RX/DR IN RCRD: CPT | Mod: HCNC,CPTII,S$GLB, | Performed by: INTERNAL MEDICINE

## 2025-04-09 PROCEDURE — 3077F SYST BP >= 140 MM HG: CPT | Mod: HCNC,CPTII,S$GLB, | Performed by: INTERNAL MEDICINE

## 2025-04-09 PROCEDURE — 93005 ELECTROCARDIOGRAM TRACING: CPT | Mod: HCNC,S$GLB,, | Performed by: INTERNAL MEDICINE

## 2025-04-09 PROCEDURE — 1159F MED LIST DOCD IN RCRD: CPT | Mod: HCNC,CPTII,S$GLB, | Performed by: INTERNAL MEDICINE

## 2025-04-09 PROCEDURE — 99999 PR PBB SHADOW E&M-EST. PATIENT-LVL III: CPT | Mod: PBBFAC,HCNC,, | Performed by: INTERNAL MEDICINE

## 2025-04-09 PROCEDURE — 93280 PM DEVICE PROGR EVAL DUAL: CPT | Mod: HCNC

## 2025-04-09 PROCEDURE — 1157F ADVNC CARE PLAN IN RCRD: CPT | Mod: HCNC,CPTII,S$GLB, | Performed by: INTERNAL MEDICINE

## 2025-04-09 PROCEDURE — 3078F DIAST BP <80 MM HG: CPT | Mod: HCNC,CPTII,S$GLB, | Performed by: INTERNAL MEDICINE

## 2025-04-09 PROCEDURE — 1101F PT FALLS ASSESS-DOCD LE1/YR: CPT | Mod: HCNC,CPTII,S$GLB, | Performed by: INTERNAL MEDICINE

## 2025-04-09 PROCEDURE — 99214 OFFICE O/P EST MOD 30 MIN: CPT | Mod: HCNC,S$GLB,, | Performed by: INTERNAL MEDICINE

## 2025-04-09 PROCEDURE — 3288F FALL RISK ASSESSMENT DOCD: CPT | Mod: HCNC,CPTII,S$GLB, | Performed by: INTERNAL MEDICINE

## 2025-04-09 PROCEDURE — 93010 ELECTROCARDIOGRAM REPORT: CPT | Mod: HCNC,S$GLB,, | Performed by: STUDENT IN AN ORGANIZED HEALTH CARE EDUCATION/TRAINING PROGRAM

## 2025-04-09 PROCEDURE — 1126F AMNT PAIN NOTED NONE PRSNT: CPT | Mod: HCNC,CPTII,S$GLB, | Performed by: INTERNAL MEDICINE

## 2025-04-11 LAB
OHS CV AF BURDEN PERCENT: < 1
OHS CV AF BURDEN PERCENT: < 1
OHS CV DC REMOTE DEVICE TYPE: NORMAL
OHS CV DC REMOTE DEVICE TYPE: NORMAL
OHS CV ICD SHOCK: NO
OHS CV RV PACING PERCENT: 1.2 %
OHS CV RV PACING PERCENT: 3.7

## 2025-04-11 NOTE — PROGRESS NOTES
Patient ID: Manisha Wynne is a 74 y.o. female.    Chief Complaint: General Illness (Entered automatically based on patient selection in [facility].)    The patient initiated a request through [facility] on 4/8/2025 for evaluation and management with a chief complaint of General Illness (Entered automatically based on patient selection in [facility].)     I evaluated the questionnaire submission on 4/8/2025..    [facility] E[facility]-Medication    4/8/2025  5:12 PM CDT - Filed by Patient   What do you need help with? Medication Management   Do you agree to participate in an E-Visit? Yes   If you have any of the following symptoms, please present to your local emergency room or call 911:  I acknowledge   Medication requests for narcotics will not be addressed via an E-Visit.  Please schedule an appointment. I acknowledge   Do you want to address a new or existing medication? I would like to start a new medication that I do not already take   What is the main issue you would like addressed today? After much thought, Im  certain i did not have measles. I was born in 1950 beforethere was a measles vaccine. Can you prescribe it for me? Can i get it at [facility] pharmacy, once you put an order in?  Thanks, Manisha   What is the name of the medication that you would like to start? Measles vaccine   Have you taken a similar medication in the past? No   Why are you requesting this particular medication? News reports recommending someone who has not had measles and is my age.    What medical condition is the  medication intended to treat? Prevention   Provide any additional information you feel is important.    Please attach any relevant images or files    Are you able to take your vital signs? Yes   Systolic Blood Pressure: 145   Diastolic Blood Pressure: 71   Weight: 134   Height: 58   Pulse: 64   Temperature: 97.2   Respiration rate:    Pulse Oxygen:          Encounter Diagnosis   Name Primary?    Need for  measles-mumps-rubella (MMR) vaccine Yes        No orders of the defined types were placed in this encounter.       Need for measles-mumps-rubella (MMR) vaccine     MMR vaccine    No follow-ups on file.      E-Visit Time Trackin minutes

## 2025-04-14 ENCOUNTER — PATIENT MESSAGE (OUTPATIENT)
Dept: HEMATOLOGY/ONCOLOGY | Facility: CLINIC | Age: 75
End: 2025-04-14

## 2025-04-14 ENCOUNTER — OFFICE VISIT (OUTPATIENT)
Dept: HEMATOLOGY/ONCOLOGY | Facility: CLINIC | Age: 75
End: 2025-04-14
Attending: INTERNAL MEDICINE
Payer: MEDICARE

## 2025-04-14 ENCOUNTER — LAB VISIT (OUTPATIENT)
Dept: LAB | Facility: OTHER | Age: 75
End: 2025-04-14
Attending: INTERNAL MEDICINE
Payer: MEDICARE

## 2025-04-14 VITALS
TEMPERATURE: 98 F | BODY MASS INDEX: 20.15 KG/M2 | DIASTOLIC BLOOD PRESSURE: 63 MMHG | HEIGHT: 68 IN | SYSTOLIC BLOOD PRESSURE: 131 MMHG | WEIGHT: 132.94 LBS | RESPIRATION RATE: 16 BRPM | HEART RATE: 63 BPM | OXYGEN SATURATION: 99 %

## 2025-04-14 DIAGNOSIS — D64.9 ANEMIA, NORMOCYTIC NORMOCHROMIC: ICD-10-CM

## 2025-04-14 DIAGNOSIS — D64.9 ANEMIA, NORMOCYTIC NORMOCHROMIC: Primary | ICD-10-CM

## 2025-04-14 DIAGNOSIS — D89.89 LIGHT CHAIN DISEASE, KAPPA TYPE: ICD-10-CM

## 2025-04-14 DIAGNOSIS — D53.9 NUTRITIONAL ANEMIA, UNSPECIFIED: ICD-10-CM

## 2025-04-14 LAB
ABSOLUTE NEUTROPHIL COUNT (OHS): 4.99 K/UL (ref 1.8–7.7)
ERYTHROCYTE [DISTWIDTH] IN BLOOD BY AUTOMATED COUNT: 13.5 % (ref 11.5–14.5)
FERRITIN SERPL-MCNC: 24 NG/ML (ref 20–300)
FOLATE SERPL-MCNC: 10.5 NG/ML (ref 4–24)
HCT VFR BLD AUTO: 35.9 % (ref 37–48.5)
HGB BLD-MCNC: 11.7 GM/DL (ref 12–16)
IMM GRANULOCYTES # BLD AUTO: 0.02 K/UL (ref 0–0.04)
MCH RBC QN AUTO: 30.2 PG (ref 27–31)
MCHC RBC AUTO-ENTMCNC: 32.6 G/DL (ref 32–36)
MCV RBC AUTO: 93 FL (ref 82–98)
PLATELET # BLD AUTO: 180 K/UL (ref 150–450)
PMV BLD AUTO: 10.4 FL (ref 9.2–12.9)
RBC # BLD AUTO: 3.88 M/UL (ref 4–5.4)
RETICS/RBC NFR AUTO: 0.8 % (ref 0.5–2.5)
WBC # BLD AUTO: 9.04 K/UL (ref 3.9–12.7)

## 2025-04-14 PROCEDURE — 1157F ADVNC CARE PLAN IN RCRD: CPT | Mod: HCNC,CPTII,S$GLB, | Performed by: INTERNAL MEDICINE

## 2025-04-14 PROCEDURE — 1126F AMNT PAIN NOTED NONE PRSNT: CPT | Mod: HCNC,CPTII,S$GLB, | Performed by: INTERNAL MEDICINE

## 2025-04-14 PROCEDURE — 36415 COLL VENOUS BLD VENIPUNCTURE: CPT | Mod: HCNC

## 2025-04-14 PROCEDURE — 1159F MED LIST DOCD IN RCRD: CPT | Mod: HCNC,CPTII,S$GLB, | Performed by: INTERNAL MEDICINE

## 2025-04-14 PROCEDURE — 3288F FALL RISK ASSESSMENT DOCD: CPT | Mod: HCNC,CPTII,S$GLB, | Performed by: INTERNAL MEDICINE

## 2025-04-14 PROCEDURE — 3078F DIAST BP <80 MM HG: CPT | Mod: HCNC,CPTII,S$GLB, | Performed by: INTERNAL MEDICINE

## 2025-04-14 PROCEDURE — 1100F PTFALLS ASSESS-DOCD GE2>/YR: CPT | Mod: HCNC,CPTII,S$GLB, | Performed by: INTERNAL MEDICINE

## 2025-04-14 PROCEDURE — 85027 COMPLETE CBC AUTOMATED: CPT | Mod: HCNC

## 2025-04-14 PROCEDURE — 99214 OFFICE O/P EST MOD 30 MIN: CPT | Mod: HCNC,S$GLB,, | Performed by: INTERNAL MEDICINE

## 2025-04-14 PROCEDURE — 99999 PR PBB SHADOW E&M-EST. PATIENT-LVL V: CPT | Mod: PBBFAC,HCNC,, | Performed by: INTERNAL MEDICINE

## 2025-04-14 PROCEDURE — 82746 ASSAY OF FOLIC ACID SERUM: CPT | Mod: HCNC

## 2025-04-14 PROCEDURE — 3075F SYST BP GE 130 - 139MM HG: CPT | Mod: HCNC,CPTII,S$GLB, | Performed by: INTERNAL MEDICINE

## 2025-04-14 PROCEDURE — 84238 ASSAY NONENDOCRINE RECEPTOR: CPT | Mod: HCNC

## 2025-04-14 PROCEDURE — 85045 AUTOMATED RETICULOCYTE COUNT: CPT | Mod: HCNC

## 2025-04-14 PROCEDURE — 82728 ASSAY OF FERRITIN: CPT | Mod: HCNC

## 2025-04-14 NOTE — PROGRESS NOTES
Subjective     Patient ID: Manisha Wynne is a 74 y.o. female.    Chief Complaint: No chief complaint on file.    HPI 74-year-old female with long history of mild normocytic anemia.  Prior workups have shown some decrease in iron saturation with low normal ferritin.  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 She has had slight elevation in her kappa light chains on free light chain analysis; however, her light chain  ratio has been normal.  UPEP and SPEP both did not show any abnormal paraproteins., making that diagnosis unsupportable.    Review of her labs reveals that she has had some degree of anemia dating back to 2011 with hemoglobin typically running in the 11 grams/deciliter range.  Over the last several years her hemoglobin has ranged from a high of 12.8 g per dL to a low of 9.9 grams/deciliter.  White blood cell count has been normal and MCV has been normal. Platelets were mildly decrease on 2 occasions in early 2024, but normal since May 2024.    In December 2024 hemoglobin was 11.9 with a MCV of 92.  And on February 10, 2025 hemoglobin was 10.1 with an MCV of 92.  Iron studies in November 2024 showed an iron saturation of 15% with a ferritin of 50.  In 2023 B12 level was measured and was normal.    Metabolic profile showed normal hepatic and renal function in February 2025 and TSH was normal at that time as well.      Today, she reports that she had has had issues with recurrent GI symptoms since she initially had C diff. she has had several episodes of C diff subsequently, most recently in November 2024.  She has had some significant weight loss over the last 6 months a 20 lb.  She reports that her appetite is decreased and she has some nausea which limits her eating.  She relates that to her GI symptoms.  When her GI symptoms become active she also has trouble with hyperactive bladder function.  She denies any  fever.      PMH:atrial fibrillation, sick sinus syndrome -  S/P pacemaker, HBP, HLP,  Review of Systems   Constitutional:  Positive for appetite change (decreased) and unexpected weight change (loss). Negative for activity change and fever.   Respiratory: Negative.     Cardiovascular: Negative.    Gastrointestinal:  Positive for diarrhea and nausea.   Genitourinary:  Positive for frequency and urgency.   Musculoskeletal:  Positive for gait problem.        Hammertoes   Neurological:  Positive for coordination difficulties.   Psychiatric/Behavioral: Negative.            Objective     Physical Exam  Vitals reviewed.   Constitutional:       Comments: Thin   HENT:      Mouth/Throat:      Mouth: Mucous membranes are moist.      Pharynx: Oropharynx is clear. No oropharyngeal exudate or posterior oropharyngeal erythema.      Comments: Tongue normal in color and papillation  Eyes:      General: No scleral icterus.  Cardiovascular:      Rate and Rhythm: Normal rate and regular rhythm.   Pulmonary:      Effort: Pulmonary effort is normal. No respiratory distress.      Breath sounds: Normal breath sounds. No wheezing or rales.   Abdominal:      Palpations: Abdomen is soft. There is no mass.      Tenderness: There is no abdominal tenderness.   Musculoskeletal:      Right lower leg: No edema.      Left lower leg: No edema.   Lymphadenopathy:      Cervical: No cervical adenopathy.      Upper Body:      Right upper body: No supraclavicular or axillary adenopathy.      Left upper body: No supraclavicular or axillary adenopathy.   Skin:     Findings: No rash.   Neurological:      Mental Status: She is alert and oriented to person, place, and time.   Psychiatric:         Mood and Affect: Mood normal.         Behavior: Behavior normal.         Thought Content: Thought content normal.         Judgment: Judgment normal.          Assessment and Plan     1. Anemia, normocytic normochromic    No evidence to support light chain MGUS.  I  suspect that her anemia may be related to chronic disease as it seems to fluctuate up and down without any clear documentation of vitamin her iron-deficiency.      We will plan to check some additional anemia labs including repeat CBC, reticulocyte count, soluble transferrin receptor, and folate level.       Route Chart for Scheduling    Med Onc Chart Routing      Follow up with physician No follow up needed.   Follow up with AMANDA    Infusion scheduling note    Injection scheduling note    Labs None   Scheduling:  Preferred lab:  Lab interval:     Imaging None      Pharmacy appointment No pharmacy appointment needed      Other referrals       No additional referrals needed           Therapy Plan Information  DENOSUMAB (PROLIA) Q6M for Stage 3a chronic kidney disease, noted on 1/17/2019  DENOSUMAB (PROLIA) Q6M for Other osteoporosis without current pathological fracture, noted on 7/3/2019  Medications  denosumab (PROLIA) injection 60 mg  60 mg, Subcutaneous, Every 26 weeks      No therapy plan of the specified type found.    No therapy plan of the specified type found.  No follow-ups on file.

## 2025-04-15 ENCOUNTER — TELEPHONE (OUTPATIENT)
Dept: NEUROLOGY | Facility: CLINIC | Age: 75
End: 2025-04-15
Payer: MEDICARE

## 2025-04-15 NOTE — TELEPHONE ENCOUNTER
Called patient to reschedule appointment due to provider being out. Patient confirmed rescheduled date for Monday, April 21st at 11:40 AM.

## 2025-04-16 LAB — STFR SERPL-MCNC: 2.9 MG/L (ref 1.8–4.6)

## 2025-04-17 ENCOUNTER — HOSPITAL ENCOUNTER (EMERGENCY)
Facility: HOSPITAL | Age: 75
Discharge: HOME | End: 2025-04-18
Attending: STUDENT IN AN ORGANIZED HEALTH CARE EDUCATION/TRAINING PROGRAM
Payer: MEDICARE
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
## 2025-04-17 DIAGNOSIS — S09.90XA CLOSED HEAD INJURY, INITIAL ENCOUNTER: ICD-10-CM

## 2025-04-17 DIAGNOSIS — S00.01XA ABRASION OF SCALP, INITIAL ENCOUNTER: ICD-10-CM

## 2025-04-17 LAB
ABSOLUTE EOSINOPHIL (OHS): 0.22 K/UL
ABSOLUTE MONOCYTE (OHS): 0.72 K/UL (ref 0.3–1)
ABSOLUTE NEUTROPHIL COUNT (OHS): 4.12 K/UL (ref 1.8–7.7)
ANION GAP (OHS): 7 MMOL/L (ref 8–16)
BASOPHILS # BLD AUTO: 0.03 K/UL
BASOPHILS NFR BLD AUTO: 0.4 %
BUN SERPL-MCNC: 20 MG/DL (ref 8–23)
CALCIUM SERPL-MCNC: 9.4 MG/DL (ref 8.7–10.5)
CHLORIDE SERPL-SCNC: 110 MMOL/L (ref 95–110)
CO2 SERPL-SCNC: 23 MMOL/L (ref 23–29)
CREAT SERPL-MCNC: 0.9 MG/DL (ref 0.5–1.4)
ERYTHROCYTE [DISTWIDTH] IN BLOOD BY AUTOMATED COUNT: 13.9 % (ref 11.5–14.5)
GFR SERPLBLD CREATININE-BSD FMLA CKD-EPI: >60 ML/MIN/1.73/M2
GLUCOSE SERPL-MCNC: 87 MG/DL (ref 70–110)
HCT VFR BLD AUTO: 33.5 % (ref 37–48.5)
HCV AB SERPL QL IA: NORMAL
HGB BLD-MCNC: 10.7 GM/DL (ref 12–16)
HIV 1+2 AB+HIV1 P24 AG SERPL QL IA: NORMAL
IMM GRANULOCYTES # BLD AUTO: 0.02 K/UL (ref 0–0.04)
IMM GRANULOCYTES NFR BLD AUTO: 0.3 % (ref 0–0.5)
LYMPHOCYTES # BLD AUTO: 2.49 K/UL (ref 1–4.8)
MCH RBC QN AUTO: 29.5 PG (ref 27–31)
MCHC RBC AUTO-ENTMCNC: 31.9 G/DL (ref 32–36)
MCV RBC AUTO: 92 FL (ref 82–98)
NUCLEATED RBC (/100WBC) (OHS): 0 /100 WBC
PLATELET # BLD AUTO: 171 K/UL (ref 150–450)
PMV BLD AUTO: 10.3 FL (ref 9.2–12.9)
POTASSIUM SERPL-SCNC: 4.4 MMOL/L (ref 3.5–5.1)
RBC # BLD AUTO: 3.63 M/UL (ref 4–5.4)
RELATIVE EOSINOPHIL (OHS): 2.9 %
RELATIVE LYMPHOCYTE (OHS): 32.8 % (ref 18–48)
RELATIVE MONOCYTE (OHS): 9.5 % (ref 4–15)
RELATIVE NEUTROPHIL (OHS): 54.1 % (ref 38–73)
SODIUM SERPL-SCNC: 140 MMOL/L (ref 136–145)
WBC # BLD AUTO: 7.6 K/UL (ref 3.9–12.7)

## 2025-04-17 PROCEDURE — 80048 BASIC METABOLIC PNL TOTAL CA: CPT | Mod: HCNC | Performed by: STUDENT IN AN ORGANIZED HEALTH CARE EDUCATION/TRAINING PROGRAM

## 2025-04-17 PROCEDURE — 99284 EMERGENCY DEPT VISIT MOD MDM: CPT | Mod: 25,HCNC

## 2025-04-17 PROCEDURE — 25000003 PHARM REV CODE 250: Mod: HCNC | Performed by: STUDENT IN AN ORGANIZED HEALTH CARE EDUCATION/TRAINING PROGRAM

## 2025-04-17 PROCEDURE — 87389 HIV-1 AG W/HIV-1&-2 AB AG IA: CPT | Mod: HCNC | Performed by: PHYSICIAN ASSISTANT

## 2025-04-17 PROCEDURE — 85025 COMPLETE CBC W/AUTO DIFF WBC: CPT | Mod: HCNC | Performed by: STUDENT IN AN ORGANIZED HEALTH CARE EDUCATION/TRAINING PROGRAM

## 2025-04-17 PROCEDURE — 86803 HEPATITIS C AB TEST: CPT | Mod: HCNC | Performed by: PHYSICIAN ASSISTANT

## 2025-04-17 RX ORDER — ACETAMINOPHEN 500 MG
1000 TABLET ORAL
Status: COMPLETED | OUTPATIENT
Start: 2025-04-17 | End: 2025-04-17

## 2025-04-17 RX ADMIN — ACETAMINOPHEN 1000 MG: 500 TABLET ORAL at 05:04

## 2025-04-17 NOTE — PROGRESS NOTES
Individual Psychotherapy (PhD/LCSW)    10/10/2018    Site: [facility]    Therapeutic Intervention: Met with patient alone.  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 She feels her trip is affordable, is managing ok. Main frustration is she can't get herself to keep her home neat, to the point that a friend chose to stay in [employer] rather than with pt. Pt states she has hx of good home management as recently as her time in [place], where she purchased a house. Help pt reflect on underlying issues that might lead her to resist things like putting her dishes in , which she always does at friends' homes.    Treatment plan:  · Target symptoms: depression, distractibility  · Why chosen therapy is appropriate versus another modality: relevant to diagnosis  · Outcome monitoring methods: self-report  · Therapeutic intervention type: insight oriented psychotherapy    Risk parameters:  Patient reports no suicidal ideation  Patient reports no homicidal ideation  Patient reports no self-injurious behavior  Patient reports no violent behavior    Verbal deficits: None    Patient's response to intervention:  The patient's response to intervention is motivated    Progress toward goals and other mental status changes:  The patient's progress toward goals is fair    Diagnosis: Major depression, in partial remission,  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

## 2025-04-17 NOTE — PROGRESS NOTES
[facility]  Clinic Note    Date of Service: 4/21/2025  Patient seen at the request of: No ref. provider found    Reason for Consultation  Neuropathy  Chronic infarcts    Assessment:  Manisha Wynne is a 74 y.o. female who presents with neuropathy.    Presents with a chronic history of imbalance without vertigo associated with many falls.  Patient is status post C3-C6 fusion in 2022.  Patient notes continued falls.  Of note, the patient can not get an MRI due to her pacemaker.    On exam, the patient has an unstable gait with internal rotation of her bilateral knees, associated with bilateral hip flexor weakness, right knee flexion weakness, high arches, and hammertoes on bilateral feet.  Distal lower extremity strength is preserved.  Reflexes are elevated at the knees and in the bilateral upper extremities.  Babinski negative bilaterally.    EMG from 2021 showed bilateral chronic L5 radiculopathy and bilaterally absent minimum H-reflex latencies.  We will reassess the lower extremities with another EMG/nerve conduction study.    Differential diagnosis includes genetic neuropathies including CMT, however, patient has preserved distal lower extremity strength on exam.  She does report footdrop when she is fatigued.  Family history is unknown due to patient being adopted, but heritage is [facility].  The patient's elevated reflexes also suggest upper motor neuron involvement.  Upper motor signs may be secondary to remote infarcts on CTH.  Patient is not MRI compatible.      EMG/NCS 7/2021:  Bilateral chronic L5 radiculopathy.  Bilaterally absent minimum H-reflex latencies are suggestive of underlying bilateral S1 chronic radiculopathy versus underlying early sensory motor axonal polyneuropathy.     EMG/NCS 9/2024:  This is a normal electrophysiologic study of the right upper extremity.     EMG/NCS 4/2025:  Bilateral lower extremities performed today revealing evidence of an axonal sensorimotor  "polyneuropathy of unclear etiology.  Also noted was evidence of chronic left L5 and bilateral S1 radiculopathies.     CT head 4/2025:  Mild chronic microvascular ischemic change and small remote lacunar infarcts of the right lateral basal ganglia and right external capsule.       Plan:    - recommend continued exercises for lower extremity strength at home   - agree with enrolling back in [employer] balance program as discussed in clinic  - referral to orthotist    - continue aspirin 81mg daily  - continue pravastatin 40mg daily    - CTA head and neck      - RTC 3 months      A dictation device was used to produce this document. Use of such devices sometimes results in grammatical errors or replacement of words that sound similarly.     Signed:    Donald Daly MD  Neurology/Epilepsy  04/21/2025 1:02 PM      HPI:  Manisha Wynne is a 74 y.o. female with   Past Medical History:   Diagnosis Date    Abnormal Pap smear     Atrial fibrillation     AV block, 1st degree 07/25/2012    C. difficile diarrhea     RESOLVED    Cataract     Depression 07/24/2012    Facet arthritis of lumbar region 03/31/2015    Falls     3 falls in the last 6 mos--noted 6/19/19    Hyperlipidemia     Hypertension 07/24/2012    Neuropathy     Other specified cardiac dysrhythmias(427.89)     Sleep apnea     Syncope 07/24/2012    Tremors of nervous system     hands bilaterally     Hx of C3-C6 posterior fusion 9/2022.    Patient has a chronic history of falls.  She got a pacemaker in 2012 after a workup for syncope.      Last big fall was in February of 2024, she was seen in the ER and then subsequently fell again and fractured her elbow, needing surgery.  Patient tends to fall forward.  She denies vertigo.  Patient denies a "numbing feeling", but she wakes up sometimes feeling like she has her shoes on.      Patient reports falling twice while going down a ramp.  Patient walks with a cane when outside.  Can ambulate without at home.  " "    Patient reports dragging her feet when she gets tired.      Patient has a history of hammer toes.  She has had podiatric surgery.      Family history unknown due to adoption.  Heritage is [place] from birth certificate.      This is the extent of the patient's complaints at this time.     From last neurosurgery note 9/12/2024 (Dr. Cheek):  Manisha Wynne is a 73 y.o. female (former  at [employer]) who presents as a referral from Dr. Mcduffie for evaluation of cervical myelopathy. The patient reports that she has had several falls from 4540-2527, which prompted her to seek neurologic evaluation. The falls have resulted in injuries, including breaking her wrist. She also has numbness, tingling, and burning in her feet. She denies pain in her neck, but endorses in her mid- and low back. Her pain is 10/10 in the legs, 10/10 in the back, and 0/10 in the neck and arms. The pain is intermittent. It is made worse by sitting. It improves with standing. She denies any change in bowel/bladder habit.      She endorses handwriting changes, dropping objects, and balance/gait difficulty. She denies fine motor changes. She has not seen pain management for her neck, but she is seeing Dr. Booker for "sciatica."      CT of the cervical spine (the patient has a non-MRI-conditional compatible pacemaker in place) was obtained and personally reviewed, demonstrating significant stenosis at C5-C6>C4-C5. Flex ex shows some mild instability of C3 on C4.      She lives alone with her tuxedo cats. Her family is in [place] and [place]. She does have friends nearby who will help to care for her in perioperative periods. She takes ASA 81 daily.      As of 5/6/21, the patient had the myelogram as requested. This is personally reviewed and demonstrates significant cord compression and OPLL. There is some stenosis at L4-L5 as well.      The patient reports that she has been well. She has had no falls. She has been doing PT for her " sciatica, which she has found helpful. She does note that her tremor, which may be progressing slightly, is now interfering with her applying makeup at times.      As of 5/25/21, the patient reports that she has not had any significant changes. She presents today with a list of questions, together with friend Kavya Roberts on the phone. She also endorses a several year history of urinary incontinence (episodes occur overnight or with coughing) for which she was going to participate in pelvic floor rehab and see urogyn; however, this was interrupted by Covid.      As of 1/11/22, the patient reports that she has had ongoing C. Diff since we scheduled surgery. She was seen about a potential colonoscopy today, but was advised against proceeding. She is due for a CT of the abdomen/pelvis next week to look at the pancreas. She is also being considered for a fecal transplant. There is also a clinical trial going on in which she's interested.      Dr. Maradiaga of ID has been quarterbacking the C. Diff response. The patient is frustrated by her lack of improvement.      She is less fatigued now than when she initially became sick, but she does not feel she has yet recovered to her baseline. She is also being recommended for cataract surgery and pacemaker generator replacement in about 13 months.      As of 8/4/22, the patient reports that she is finally over her C. Diff. 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 She notes that she has been really careful. She walks very slowly. She has noticed some tremor in her hands at rest. This gets better when she holds onto an object. She denies anosmia. She denies significant sleep difficulties and other non-motor symptoms of PD.      As of 12/22/22, the patient underwent C3-C6 posterior cervical decompression and fusion on 11/14/22. She reports that she has been doing well since surgery. She has NOT been using a bone growth stimulator. She denies fever,  chills, or drainage from the incision. She has no complaints of C. Diff. She is eager to return to work in the 's office.      As of 2/2/23, the patient returns in scheduled follow-up. She reports she has been well. She is walking 6000 steps several times a week. She has returned to work, though she has been a bit more forgetful about dates and times. We discussed Dr. Cordero.      She saw Sunitha this morning, who is starting outpatient PT.      Bone growth stimulator was received, and she has been using it.      She reports that she had a mechanical trip and fall in which her head didn't hit the ground last week.      As of 5/4/23, the patient returns in scheduled postoperative follow up. She reports that she has one additional month of PT.      She reports that she has had 3 falls since we saw each other last, but all of them have been mechanical. One she slipped on water, one she was getting up from the floor, once when walking on uneven ground (sidewalk was dug up). She notes that she moved about a month ago.       She occasionally has headaches. She has occasional neck pain in the left upper portion, but this has been improving. The cats have been well. She is back at work 2 days a week.      As of 2/22/24, the patient returns in scheduled follow-up. Overall, she has been well, though she is still falling (most recently in December). She sustained a head laceration after a fall at [facility] in November that now appears well healed.      She requests a referral to neurology for her ongoing neuropathy.      She is also complaining of low back pain with sciatica. She denies any new weakness associated with this and is interested in doing [employer].      Propanolol has been helping her tremor.      As of 9/12/24, the patient returns after having completed myelogram of the entire spine. This demonstrates that the right C3 screw is too medial (as previously appreciated) but that there is no mass effect on the  thecal sac nor spinal cord.      She is still hoping to re-establish with a neurologist for her peripheral neuropathy. 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 She is still working.       TSH   Date Value Ref Range Status   02/10/2025 2.522 0.400 - 4.000 uIU/mL Final   11/08/2024 2.950 0.400 - 4.000 uIU/mL Final     Vitamin B-12   Date Value Ref Range Status   11/16/2023 >2000 (H) 210 - 950 pg/mL Final   03/13/2023 616 210 - 950 pg/mL Final     Hemoglobin A1C   Date Value Ref Range Status   11/16/2023 5.5 4.0 - 5.6 % Final     Comment:     ADA Screening Guidelines:  5.7-6.4%  Consistent with prediabetes  >or=6.5%  Consistent with diabetes    High levels of fetal hemoglobin interfere with the HbA1C  assay. Heterozygous hemoglobin variants (HbS, HgC, etc)do  not significantly interfere with this assay.   However, presence of multiple variants may affect accuracy.     08/05/2022 5.3 4.0 - 5.6 % Final     Comment:     ADA Screening Guidelines:  5.7-6.4%  Consistent with prediabetes  >or=6.5%  Consistent with diabetes    High levels of fetal hemoglobin interfere with the HbA1C  assay. Heterozygous hemoglobin variants (HbS, HgC, etc)do  not significantly interfere with this assay.   However, presence of multiple variants may affect accuracy.           Review of Systems:  ROS negative unless noted in HPI    Past Surgical History:  Past Surgical History:   Procedure Laterality Date    APPLICATION OF CARTILAGE GRAFT Bilateral 12/19/2019    Procedure: APPLICATION, CARTILAGE GRAFT AURICULAR JEZ;  Surgeon: Martinez Flores III, MD;  Location: [facility];  Service: ENT;  Laterality: Bilateral;    CARDIAC PACEMAKER PLACEMENT  09/07/2012    [id number]    CATARACT EXTRACTION W/  INTRAOCULAR LENS IMPLANT Right 5/16/2022    Procedure: EXTRACTION, CATARACT, WITH IOL INSERTION;  Surgeon: Ines Aguilera MD;  Location: [facility];  Service: Ophthalmology;  Laterality:  Right;  Catalys     CATARACT EXTRACTION W/  INTRAOCULAR LENS IMPLANT Left 6/20/2022    Procedure: EXTRACTION, CATARACT, WITH IOL INSERTION;  Surgeon: Ines Aguilera MD;  Location: [facility];  Service: Ophthalmology;  Laterality: Left;  Catalys     DILATION AND CURETTAGE OF UTERUS      Hx of precancerous cells?    ENDOSCOPIC ULTRASOUND OF UPPER GASTROINTESTINAL TRACT N/A 7/5/2019    Procedure: ULTRASOUND, UPPER GI TRACT, ENDOSCOPIC;  Surgeon: Kev Calderon MD;  Location: [facility] ENDO (2ND FLR);  Service: Endoscopy;  Laterality: N/A;  Pacemaker-Adam   appt confirmed-rb    MYELOGRAPHY N/A 5/4/2021    Procedure: Myelogram  CERVICAL, THORACIC AND LUMBAR;  Surgeon: [facility] Diagnostic Provider;  Location: [facility] OR 2ND FLR;  Service: Radiology;  Laterality: N/A;    NASAL SEPTOPLASTY N/A 12/19/2019    Procedure: SEPTOPLASTY, NOSE;  Surgeon: Martinez Flores III, MD;  Location: [facility];  Service: ENT;  Laterality: N/A;  FOLLOW DR SALVATORE KIMBROUGH PROTOCOL    OPEN REDUCTION AND INTERNAL FIXATION (ORIF) OF FRACTURE OF DISTAL RADIUS Left 1/24/2020    Procedure: ORIF, FRACTURE, RADIUS, DISTAL-left;  Surgeon: Ellen Moe MD;  Location: [facility] OR;  Service: Orthopedics;  Laterality: Left;    OPEN REDUCTION AND INTERNAL FIXATION (ORIF) OF INJURY OF ELBOW Right 2/28/2024    Procedure: ORIF, ELBOW;  Surgeon: Ellen Moe MD;  Location: [facility];  Service: Orthopedics;  Laterality: Right;    POSTERIOR FUSION OF CERVICAL SPINE WITH LAMINECTOMY N/A 11/14/2022    Procedure: LAMINECTOMY, SPINE, CERVICAL, WITH POSTERIOR FUSION C3-C6;  Surgeon: Nicolette Cheek MD;  Location: [facility] OR 2ND FLR;  Service: Neurosurgery;  Laterality: N/A;  TORONTO III, ASA III, BLOOD TYPE AND SCREEN, NEUROMONITORING EMG SEP MEP, BRACE/HALO [place], BED REGULAR BED, HEADREST [place], POSITION PRONE, SPECIAL EQUIPMENT NIKI [place], RADIOLOGY C-ARM, EXT. BONE STIMULATOR BIOMET    REPLACEMENT OF PACEMAKER GENERATOR Left 10/7/2022    Procedure:  REPLACEMENT, PACEMAKER GENERATOR;  Surgeon: John Sheets MD;  Location: [facility] EP LAB;  Service: Cardiology;  Laterality: Left;  YAS, SSS, AVB, Dual PPM Gen Change,SJM, MAC ,LA, 3 prep,** Adam dcPPM in situ**    SURGICAL REMOVAL OF NASAL TURBINATE Bilateral 2019    Procedure: EXCISION, NASAL TURBINATE;  Surgeon: Martinez Flores III, MD;  Location: [facility] OR;  Service: ENT;  Laterality: Bilateral;    TONSILLECTOMY      WISDOM TOOTH EXTRACTION         Family History:  Family History   Adopted: Yes   Problem Relation Name Age of Onset    Amblyopia Neg Hx      Blindness Neg Hx      Cataracts Neg Hx      Glaucoma Neg Hx      Macular degeneration Neg Hx      Retinal detachment Neg Hx         Social History:  Social History     Tobacco Use    Smoking status: Former     Current packs/day: 0.00     Average packs/day: 0.3 packs/day for 15.0 years (3.8 ttl pk-yrs)     Types: Cigarettes     Start date: 1967     Quit date: 1982     Years since quittin.7    Smokeless tobacco: Former   Substance Use Topics    Alcohol use: Yes     Comment: no daily or heavy use, ~4 times per month    Drug use: No       Allergies:  Patient has no known allergies.    Outpatient Medications:  Prior to Admission medications    Medication Sig Start Date End Date Taking? Authorizing Provider   acetaminophen (TYLENOL) 500 MG tablet Take 2 tablets (1,000 mg total) by mouth 2 (two) times daily as needed for Pain. Begin post op day 3. 24   Sarmad Varghese PA-C   aspirin (ECOTRIN) 81 MG EC tablet Take 1 tablet (81 mg total) by mouth 2 (two) times a day. 24  Sarmad Varghese PA-C   buPROPion (WELLBUTRIN XL) 300 MG 24 hr tablet Take 1 tablet by mouth once daily. 12/15/22   Provider, Historical   estradioL (ESTRACE) 0.01 % (0.1 mg/gram) vaginal cream Use one gram nightly x 2 weeks then twice weekly 24   Jessica Messer NP   FLUoxetine 40 MG capsule Take 40 mg by mouth. 10/11/24   Provider, Historical   fluticasone  propionate (FLONASE) 50 mcg/actuation nasal spray 1 spray (50 mcg total) by Each Nostril route once daily. 10/9/24   Louise Canela NP   lisdexamfetamine (VYVANSE) 70 MG capsule Take 70 mg by mouth every morning.    Provider, Historical   losartan (COZAAR) 25 MG tablet TAKE 1 TABLET ONE TIME DAILY 6/3/24   Natali Comer MD   melatonin (MELATIN) 5 mg Take 1 tablet by mouth every evening. (For insomnia)    Provider, Historical   methocarbamoL (ROBAXIN) 500 MG Tab One - two daily as needed for back pain 5/3/24   Iris Blue MD   methyl salicylate-menthol 15-10% 15-10 % Crea Apply topically every evening. 12/13/22   Angely Dillon NP   ondansetron (ZOFRAN-ODT) 4 MG TbDL Take 1 tablet (4 mg total) by mouth every 8 (eight) hours as needed (every 8-12 hrs prn nausea). 11/22/24   Ellen Cronin MD   pravastatin (PRAVACHOL) 40 MG tablet TAKE 1 TABLET ONE TIME DAILY 11/21/24   Iris Blue MD   PROLIA 60 mg/mL Syrg Inject 60 mg into the skin every 6 (six) months. 2/23/23   Provider, Historical   propranoloL (INDERAL) 20 MG tablet TAKE 1 TABLET TWICE DAILY 4/25/24   Natlai Comer MD   vibegron (GEMTESA) 75 mg Tab Take 1 tablet (75 mg total) by mouth Daily. 10/24/24   Jessica Messer NP       Physical exam:    Vitals: /83 (BP Location: Right arm, Patient Position: Sitting)   Pulse 71   Wt 61 kg (134 lb 7.7 oz)   LMP 10/01/2003   BMI 20.45 kg/m²     General:   Sitting in chair, in no distress, well-nourished, well-developed, appears stated age.  Head/Neck:   Normocephalic,atraumatic  Pulm:  Non-labored breathing     Mental Status: Alert and oriented to person, time, place, situation. Speech spontaneous and fluent without paraphasias; intermittent dysarthria  CN:  II: visual fields full  III, IV, VI: EOM intact without nystagmus or diplopia.   V: Sensation to light touch full and symmetric in V1-3. [place] contraction full bilaterally.   VII: Facial movement full  and symmetric.   VIII: Hearing grossly normal to conversation.  IX, X: Palate midline with symmetric elevation.    XI: SCM and trapezius: 5/5 bilaterally.   XII: Tongue midline without fasciculations.  Motor: Normal bulk and tone throughout all four extremities.   LUE: D: 5/5; B: 5/5; T:  5/5; WF: 5/5; WE:  5/5; IO: 5/5   RUE: D: 5/5; B: 5/5; T:  5/5; WF:5/5; WE:  5/5; IO: 5/5   LLE: HF: 4/5, KE: 5/5, KF: 5/5, DF: 5/5, PF: 5/5  RLE: HF: 4/5, KE: 5/5, KF: 4+/5, DF: 5/5, PF: 5/5  Unable to stand seated position without help from her upper extremities  No tremors   Sensory: Decreased vibratory sensation to the BLE.  Mostly in the R big toe and right patella  Reflexes: LUE: Biceps 3+ brachioradialis 3+  RUE: Biceps 3+, brachioradialis 3+  LLE: Knee 3+ suprapatellar, Babinski equivocal  RLE: Knee 3+ suprapatellar, Babinski downgoing  Feet are arched prominently bilaterally  Hammer toes present  Coordination:  Intact and symmetric to finger-to-nose and heel-to-shin.  Gait: Wobbly gait with knees internally rotated.  Unable to tandem gait.  Romberg negative.      Imaging:  All pertinent imaging was personally reviewed.      I spent a total of 32 minutes on the day of the visit. This includes face to face time and non-face to face time preparing to see the patient (eg, review of tests), obtaining and/or reviewing separately obtained history, documenting clinical information in the electronic or other health record, independently interpreting results and communicating results to the patient/family/caregiver, or care coordinator.

## 2025-04-17 NOTE — ED TRIAGE NOTES
Manisha Wynne, a 74 y.o. female presents to the ED w/ complaint of fall. Patient states she had a trip and fall while trying to go to the bathroom. Patient fell backwards hitting her head. Laceration to the back of the head noted. Patient denies blurry vision, endorses headache. Denies feeling unwell prior to the incident. Denies blood thinners or LOC.     Triage note:  Chief Complaint   Patient presents with    Fall     Slip and fall here at the hospital pt is the [place] here, denies LOC, not on blood thinners, hit back of her head     Review of patient's allergies indicates:  No Known Allergies  Past Medical History:   Diagnosis Date    Abnormal Pap smear     Atrial fibrillation     AV block, 1st degree 07/25/2012    C. difficile diarrhea     RESOLVED    Cataract     Depression 07/24/2012    Facet arthritis of lumbar region 03/31/2015    Falls     3 falls in the last 6 mos--noted 6/19/19    Hyperlipidemia     Hypertension 07/24/2012    Neuropathy     Other specified cardiac dysrhythmias(427.89)     Sleep apnea     Syncope 07/24/2012    Tremors of nervous system     hands bilaterally

## 2025-04-17 NOTE — ED PROVIDER NOTES
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  Patient states that she felt well today.  She was in her usual state of health.  She denies any preceding chest pain, shortness breath, lightheadedness, dizziness, palpitations, headache.  She uses a cane for assistance with ambulation at baseline.  She states that she tripped and fell backwards and hit her head.  She denies LOC.  She is not on anticoagulation.  No current headache, nausea, vomiting, neck pain, back pain, vision changes, extremity weakness or numbness.    Review of patient's allergies indicates:  No Known Allergies  Past Medical History:   Diagnosis Date    Abnormal Pap smear     Atrial fibrillation     AV block, 1st degree 07/25/2012    C. difficile diarrhea     RESOLVED    Cataract     Depression 07/24/2012    Facet arthritis of lumbar region 03/31/2015    Falls     3 falls in the last 6 mos--noted 6/19/19    Hyperlipidemia     Hypertension 07/24/2012    Neuropathy     Other specified cardiac dysrhythmias(427.89)     Sleep apnea     Syncope 07/24/2012    Tremors of nervous system     hands bilaterally     Past Surgical History:   Procedure Laterality Date    APPLICATION OF CARTILAGE GRAFT Bilateral 12/19/2019    Procedure: APPLICATION, CARTILAGE GRAFT AURICULAR JEZ;  Surgeon: Martinez Flores III, MD;  Location: [facility];  Service: ENT;  Laterality: Bilateral;    CARDIAC PACEMAKER PLACEMENT  09/07/2012    [id number]    CATARACT EXTRACTION W/  INTRAOCULAR LENS IMPLANT Right 5/16/2022     Procedure: EXTRACTION, CATARACT, WITH IOL INSERTION;  Surgeon: Ines Aguilera MD;  Location: [facility];  Service: Ophthalmology;  Laterality: Right;  Catalys     CATARACT EXTRACTION W/  INTRAOCULAR LENS IMPLANT Left 6/20/2022    Procedure: EXTRACTION, CATARACT, WITH IOL INSERTION;  Surgeon: Ines Aguilera MD;  Location: [facility];  Service: Ophthalmology;  Laterality: Left;  Catalys     DILATION AND CURETTAGE OF UTERUS      Hx of precancerous cells?    ENDOSCOPIC ULTRASOUND OF UPPER GASTROINTESTINAL TRACT N/A 7/5/2019    Procedure: ULTRASOUND, UPPER GI TRACT, ENDOSCOPIC;  Surgeon: Kev Calderon MD;  Location: [facility] ENDO (2ND FLR);  Service: Endoscopy;  Laterality: N/A;  Pacemaker-Adam   appt confirmed-rb    MYELOGRAPHY N/A 5/4/2021    Procedure: Myelogram  CERVICAL, THORACIC AND LUMBAR;  [facility];  Location: [facility] 2ND FLR;  Service: Radiology;  Laterality: N/A;    NASAL SEPTOPLASTY N/A 12/19/2019    Procedure: SEPTOPLASTY, NOSE;  Surgeon: Martinez Flores III, MD;  Location: [facility];  Service: ENT;  Laterality: N/A;  FOLLOW DR SALVATORE KIMBROUGH PROTOCOL    OPEN REDUCTION AND INTERNAL FIXATION (ORIF) OF FRACTURE OF DISTAL RADIUS Left 1/24/2020    Procedure: ORIF, FRACTURE, RADIUS, DISTAL-left;  Surgeon: Ellen Moe MD;  Location: [facility];  Service: Orthopedics;  Laterality: Left;    OPEN REDUCTION AND INTERNAL FIXATION (ORIF) OF INJURY OF ELBOW Right 2/28/2024    Procedure: ORIF, ELBOW;  Surgeon: Ellen Moe MD;  Location: [facility];  Service: Orthopedics;  Laterality: Right;    POSTERIOR FUSION OF CERVICAL SPINE WITH LAMINECTOMY N/A 11/14/2022    Procedure: LAMINECTOMY, SPINE, CERVICAL, WITH POSTERIOR FUSION C3-C6;  Surgeon: Nicolette Cheek MD;  Location: [facility] 2ND FLR;  Service: Neurosurgery;  Laterality: N/A;  TORONTO III, ASA III, BLOOD TYPE AND SCREEN, NEUROMONITORING EMG SEP MEP, BRACE/HALO [place], BED REGULAR BED, HEADREST [place], POSITION PRONE, SPECIAL EQUIPMENT AMPARO  NIKI DASILVA, RADIOLOGY C-ARM, EXT. BONE STIMULATOR BIOMET    REPLACEMENT OF PACEMAKER GENERATOR Left 10/7/2022    Procedure: REPLACEMENT, PACEMAKER GENERATOR;  Surgeon: John Sheets MD;  Location: [facility] EP LAB;  Service: Cardiology;  Laterality: Left;  YAS, SSS, AVB, Dual PPM Gen Change,SJM, MAC ,CA, 3 prep,** Adam dcPPM in situ**    SURGICAL REMOVAL OF NASAL TURBINATE Bilateral 12/19/2019    Procedure: EXCISION, NASAL TURBINATE;  Surgeon: Martinez Flores III, MD;  Location: [facility] [place];  Service: ENT;  Laterality: Bilateral;    TONSILLECTOMY      WISDOM TOOTH EXTRACTION       Family History   Adopted: Yes   Problem Relation Name Age of Onset    Amblyopia Neg Hx      Blindness Neg Hx      Cataracts Neg Hx      Glaucoma Neg Hx      Macular degeneration Neg Hx      Retinal detachment Neg Hx       Social History[1]  Review of Systems  A full review of systems was obtained, see HPI for pertinent positives.    Physical Exam     Initial Vitals [04/17/25 1610]   BP Pulse Resp Temp SpO2   (!) 186/82 61 16 97.8 °F (36.6 °C) 99 %      MAP       --         Physical Exam  Constitutional: No acute distress, well-appearing  HENT: Normocephalic, small scalp laceration to the occiput with bleeding controlled, matted hair overlying the area, no raccoon eyes or velázquez sign, nares patent, no trismus or malocclusion  Eyes: PERRL  Spine: No C/T/L-spine tenderness, no step offs or deformities, neck is supple with normal range of motion  Respiratory:  Nonlabored  Cardiovascular: Regular rate and rhythm, intact distal pulses in all extremities  Gastrointestinal: Soft, non-tender, non-distended  Pelvis: Stable  Musculoskeletal: No deformity, moving all extremities well, no focal tenderness, compartments soft  Integumentary: Warm and dry  Neurological: Awake and alert, follows commands in all extremities, oriented, 5/5 motor and sensation light touch intact in all extremities, cranial nerves 2-12 grossly intact     ED Course    Procedures  Labs Reviewed   BASIC METABOLIC PANEL - Abnormal       Result Value    Sodium 140      Potassium 4.4      Chloride 110      CO2 23      Glucose 87      BUN 20      Creatinine 0.9      Calcium 9.4      Anion Gap 7 (*)     eGFR >60     CBC WITH DIFFERENTIAL - Abnormal    WBC 7.60      RBC 3.63 (*)     HGB 10.7 (*)     HCT 33.5 (*)     MCV 92      MCH 29.5      MCHC 31.9 (*)     RDW 13.9      Platelet Count 171      MPV 10.3      Nucleated RBC 0      Neut % 54.1      Lymph % 32.8      Mono % 9.5      Eos % 2.9      Basophil % 0.4      Imm Grans % 0.3      Neut # 4.12      Lymph # 2.49      Mono # 0.72      Eos # 0.22      Baso # 0.03      Imm Grans # 0.02     HEPATITIS C ANTIBODY - Normal    Hep C Ab Interp Non-Reactive     HIV 1 / 2 ANTIBODY - Normal    HIV 1/2 Ag/Ab Non-Reactive     CBC W/ AUTO DIFFERENTIAL    Narrative:     The following orders were created for panel order CBC auto differential.  Procedure                               Abnormality         Status                     ---------                               -----------         ------                     CBC with Differential[[id number]]       Abnormal            Final result                 Please view results for these tests on the individual orders.   HEP C VIRUS HOLD SPECIMEN          Imaging Results              CT Cervical Spine Without Contrast (Final result)  Result time 04/17/25 22:13:43      Final result by Darin Sahni MD (04/17/25 22:13:43)                   Impression:      1. No acute abnormality.  2. Multilevel chronic degenerative and postoperative changes.  See above comments.      Electronically signed by: Darin Sahni  Date:    04/17/2025  Time:    22:13               Narrative:    EXAMINATION:  CT CERVICAL SPINE WITHOUT CONTRAST    CLINICAL HISTORY:  Neck trauma (Age >= 65y);    TECHNIQUE:  Low dose axial CT images through the cervical spine, with sagittal and coronal reformations.  Contrast was not  administered.    COMPARISON:  06/25/2024    FINDINGS:  No acute fractures of the cervical spine.  Grade 1 spondylolisthesis of C7 on T1 and T2 on T3.    Posterior fusion hardware C3 through C6.    Stable positioning of the surgical hardware.  Right C3 pedicle screw extends to the lateral margin of the central canal on axial 137 series 3.  C6 pedicle screw on the left extends into the posterior margin of the transverse foramen on axial 208 of series 3.    Laminectomy defect also.    Severe disc space narrowing at C5-6 and C4-5 with endplate degenerative changes.  Mild moderate disc space narrowing elsewhere.    Limited evaluation of the intraspinal contents demonstrates no hematoma or mass.Paraspinal soft tissues exhibit no acute abnormalities.    Mild right mastoid air cell disease.    Severe foraminal narrowing at C4-5 on the left, C5-6 on the left, C6-7 on the left.    Moderate posterior disc osteophyte complex at C4-5 and C5-6.                                       CT Head Without Contrast (Final result)  Result time 04/17/25 22:02:52      Final result by Darin Sahni MD (04/17/25 22:02:52)                   Impression:      1. No acute intracranial process.  2. Mild chronic microvascular ischemic change and small remote lacunar infarcts of the right lateral basal ganglia and right external capsule.      Electronically signed by: Darin Sahni  Date:    04/17/2025  Time:    22:02               Narrative:    EXAMINATION:  CT HEAD WITHOUT CONTRAST    CLINICAL HISTORY:  Head trauma, minor (Age >= 65y);    TECHNIQUE:  Low dose axial CT images obtained throughout the head without intravenous contrast. Sagittal and coronal reconstructions were performed.    COMPARISON:  02/13/2025    FINDINGS:  Intracranial compartment:    Ventricles and sulci are normal in size for age without evidence of hydrocephalus. No extra-axial blood or fluid collections.    Mild chronic microvascular ischemic change in the periventricular  white matter.  Small remote lacunar infarct of the right lateral basal ganglia and right external capsule.    No parenchymal mass, hemorrhage, edema or major vascular distribution infarct.    Skull/extracranial contents (limited evaluation): No fracture. Mastoid air cells and paranasal sinuses are essentially clear.    Scalp contusion posteriorly on the right near the apex.                                       Medications   LETS (LIDOcaine-TETRAcaine-EPINEPHrine) gel solution (has no administration in time range)   Tdap vaccine injection 0.5 mL (has no administration in time range)   acetaminophen tablet 1,000 mg (1,000 mg Oral Given 4/17/25 1705)     Medical Decision Making  74-year-old female here after a mechanical fall.  She fell backwards and struck her head.  No LOC.  She is not anticoagulated.  She has a small posterior scalp lack.  Bleeding controlled but patient does have overlying matted hair.  Will update tetanus, cleaned the area and perform any laceration repair needed.  CT head and cervical spine ordered.  She is neurologically intact.  Tylenol ordered for pain control.  Dispo pending labs, imaging and re-evaluation.    Labs reviewed and reassuring. Scalp wound cleaned. No laceration, only an abrasion. Patient comfortable on re-evaluation.     CT reads reviewed.  Patient does have remote infarct but no acute findings.  Patient was counseled on these findings and advised to follow up with her neurologist as soon as possible.  Patient discharged in stable condition.  Return precautions discussed at discharge.    Amount and/or Complexity of Data Reviewed  Labs: ordered.  Radiology: ordered.    Risk  OTC drugs.  Prescription drug management.                                      Clinical Impression:  Final diagnoses:  [W19.XXXA] Fall, initial encounter (Primary)  [S09.90XA] Closed head injury, initial encounter  [S00.01XA] Abrasion of scalp, initial encounter          ED Disposition Condition    Discharge  Good          ED Prescriptions    None       Follow-up Information       Follow up With Specialties Details Why Contact Info    Iris Blue MD Internal Medicine Schedule an appointment as soon as possible for a visit   [address]  [place] [address]  [phone]      [facility] - Emergency Dept Emergency Medicine  As needed, If symptoms worsen [address] [facility] [id number] [phone]               Nelly Reyes MD  [phone]         [1]   Social History  Tobacco Use    Smoking status: Former     Current packs/day: 0.00     Average packs/day: 0.3 packs/day for 15.0 years (3.8 ttl pk-yrs)     Types: Cigarettes     Start date: 1967     Quit date: 1982     Years since quittin.7    Smokeless tobacco: Former   Substance Use Topics    Alcohol use: Yes     Comment: no daily or heavy use, ~4 times per month    Drug use: No        Nelly Reyes MD  [phone]

## 2025-04-18 VITALS
OXYGEN SATURATION: 100 % | DIASTOLIC BLOOD PRESSURE: 74 MMHG | WEIGHT: 135 LBS | HEIGHT: 68 IN | RESPIRATION RATE: 18 BRPM | HEART RATE: 64 BPM | BODY MASS INDEX: 20.46 KG/M2 | SYSTOLIC BLOOD PRESSURE: 164 MMHG | TEMPERATURE: 98 F

## 2025-04-18 LAB
OHS QRS DURATION: 136 MS
OHS QTC CALCULATION: 462 MS

## 2025-04-21 ENCOUNTER — OFFICE VISIT (OUTPATIENT)
Dept: NEUROLOGY | Facility: CLINIC | Age: 75
End: 2025-04-21
Payer: MEDICARE

## 2025-04-21 VITALS
WEIGHT: 134.5 LBS | SYSTOLIC BLOOD PRESSURE: 126 MMHG | DIASTOLIC BLOOD PRESSURE: 83 MMHG | BODY MASS INDEX: 20.45 KG/M2 | HEART RATE: 71 BPM

## 2025-04-21 DIAGNOSIS — M54.16 LUMBAR RADICULOPATHY, CHRONIC: Primary | ICD-10-CM

## 2025-04-21 DIAGNOSIS — I63.81 LACUNAR INFARCTION: ICD-10-CM

## 2025-04-21 PROCEDURE — 99999 PR PBB SHADOW E&M-EST. PATIENT-LVL III: CPT | Mod: PBBFAC,,, | Performed by: STUDENT IN AN ORGANIZED HEALTH CARE EDUCATION/TRAINING PROGRAM

## 2025-04-21 RX ORDER — ASPIRIN 81 MG/1
81 TABLET ORAL DAILY
Qty: 90 TABLET | Refills: 3 | Status: SHIPPED | OUTPATIENT
Start: 2025-04-21

## 2025-04-28 ENCOUNTER — HOSPITAL ENCOUNTER (OUTPATIENT)
Dept: RADIOLOGY | Facility: HOSPITAL | Age: 75
Discharge: HOME | End: 2025-04-28
Attending: STUDENT IN AN ORGANIZED HEALTH CARE EDUCATION/TRAINING PROGRAM
Payer: MEDICARE

## 2025-04-28 DIAGNOSIS — I63.81 LACUNAR INFARCTION: ICD-10-CM

## 2025-04-28 PROCEDURE — 25500020 PHARM REV CODE 255: Performed by: STUDENT IN AN ORGANIZED HEALTH CARE EDUCATION/TRAINING PROGRAM

## 2025-04-28 PROCEDURE — 70496 CT ANGIOGRAPHY HEAD: CPT | Mod: TC

## 2025-04-28 RX ADMIN — IOHEXOL 75 ML: 350 INJECTION, SOLUTION INTRAVENOUS at 12:04

## 2025-05-06 ENCOUNTER — OFFICE VISIT (OUTPATIENT)
Dept: GASTROENTEROLOGY | Facility: CLINIC | Age: 75
End: 2025-05-06
Payer: MEDICARE

## 2025-05-06 VITALS
HEIGHT: 68 IN | DIASTOLIC BLOOD PRESSURE: 73 MMHG | BODY MASS INDEX: 20.18 KG/M2 | WEIGHT: 133.19 LBS | HEART RATE: 60 BPM | SYSTOLIC BLOOD PRESSURE: 124 MMHG

## 2025-05-06 DIAGNOSIS — R14.3 EXCESSIVE GAS: ICD-10-CM

## 2025-05-06 DIAGNOSIS — Z86.19 HISTORY OF CLOSTRIDIOIDES DIFFICILE INFECTION: Primary | ICD-10-CM

## 2025-05-06 PROCEDURE — 99999 PR PBB SHADOW E&M-EST. PATIENT-LVL V: CPT | Mod: PBBFAC,HCNC,,

## 2025-05-06 NOTE — PROGRESS NOTES
Gastroenterology Clinic Consultation Note    Reason for Visit:  The primary encounter diagnosis was History of Clostridioides difficile infection. A diagnosis of Excessive gas was also pertinent to this visit.    PCP:   Iris Blue   [address] [facility] [address] / [place] [id number]      Initial HPI   This is a 74 y.o. female presenting for GI Followup for diarrhea related to C.Diff. Has had positive results in the past dating back to 6/2021. She does have a hx of fecal transplant done with [facility] for this. Tested positive for C. Diff on 11/19/24 and was treated with Dificid from her PCP. With repeat C.Diff done that was noted to be negative in February. Her diarrhea has improved. Her main complaint today is excess gas lately. When she does get excess gas, she will have lower abdominal discomfort. She is not able to identify specific food triggers. Does take probiotics. She does not take fiber supplements. She denies blood in her stool.     ROS:  Review of Systems   Constitutional:  Negative for chills, fever and weight loss.   Eyes:  Negative for redness.   Respiratory:  Negative for cough and wheezing.    Cardiovascular:  Negative for chest pain.   Gastrointestinal:  Negative for abdominal pain, blood in stool, constipation, diarrhea (improved), heartburn, melena, nausea and vomiting.   Skin:  Negative for rash.   Neurological:  Negative for seizures, loss of consciousness and weakness.        Medical History:  has a past medical history of Abnormal Pap smear, Atrial fibrillation, AV block, 1st degree (07/25/2012), C. difficile diarrhea, Cataract, Depression (07/24/2012), Facet arthritis of lumbar region (03/31/2015), Falls, Hyperlipidemia, Hypertension (07/24/2012), Neuropathy, Other specified cardiac dysrhythmias(427.89), Sleep apnea, Syncope (07/24/2012), and Tremors of nervous system.    Surgical History:  has a past  "surgical history that includes Tonsillectomy; Dilation and curettage of uterus; Cardiac pacemaker placement (09/07/2012); Endoscopic ultrasound of upper gastrointestinal tract (N/A, 7/5/2019); Shaw tooth extraction; Nasal septoplasty (N/A, 12/19/2019); Application of cartilage graft (Bilateral, 12/19/2019); Surgical removal of nasal turbinate (Bilateral, 12/19/2019); Open reduction and internal fixation (ORIF) of fracture of distal radius (Left, 1/24/2020); Myelography (N/A, 5/4/2021); Cataract extraction w/  intraocular lens implant (Right, 5/16/2022); Cataract extraction w/  intraocular lens implant (Left, 6/20/2022); Replacement of pacemaker generator (Left, 10/7/2022); Posterior fusion of cervical spine with laminectomy (N/A, 11/14/2022); and Open reduction and internal fixation (ORIF) of injury of elbow (Right, 2/28/2024).    Family History: family history is not on file. She was adopted..       Review of patient's allergies indicates:  No Known Allergies    Medications Ordered Prior to Encounter[1]      Objective Findings:    Vital Signs:  /73   Pulse 60   Ht 5' 8" (1.727 m)   Wt 60.4 kg (133 lb 2.5 oz)   LMP 10/01/2003   BMI 20.25 kg/m²   Body mass index is 20.25 kg/m².    Physical Exam:  Physical Exam  Vitals and nursing note reviewed.   Constitutional:       Appearance: She is normal weight. She is not ill-appearing.   HENT:      Mouth/Throat:      Mouth: Mucous membranes are moist.      Pharynx: Oropharynx is clear.   Eyes:      General: No scleral icterus.  Abdominal:      General: Abdomen is flat. Bowel sounds are normal. There is no distension.      Palpations: Abdomen is soft. There is no mass.      Tenderness: There is no abdominal tenderness.      Hernia: No hernia is present.   Skin:     General: Skin is warm and dry.      Capillary Refill: Capillary refill takes less than 2 seconds.      Coloration: Skin is not jaundiced or pale.   Neurological:      Mental Status: She is alert and " oriented to person, place, and time. Mental status is at baseline.             Labs:  Lab Results   Component Value Date    WBC 7.60 04/17/2025    HGB 10.7 (L) 04/17/2025    HCT 33.5 (L) 04/17/2025     04/17/2025    CRP 0.4 06/21/2019    CHOL 151 03/08/2024    TRIG 72 03/08/2024    HDL 56 03/08/2024    ALKPHOS 105 02/10/2025    LIPASE 5 12/06/2016    ALT 25 02/10/2025    AST 18 02/10/2025     04/17/2025    K 4.4 04/17/2025     04/17/2025    CREATININE 0.9 04/17/2025    BUN 20 04/17/2025    CO2 23 04/17/2025    TSH 2.522 02/10/2025    INR 1.1 10/07/2022    HGBA1C 5.5 11/16/2023       Imaging reviewed: No pertinent imaging reviewed       Endoscopy reviewed: Colonoscopy 8/7/2015  Impression:           - The entire examined colon is normal.                         - The examined portion of the ileum was normal.                         - No specimens collected.   Recommendation:       - Discharge patient to home.                         - Patient has a contact number available for                         emergencies. The signs and symptoms of potential                         delayed complications were discussed with the                         patient. Return to normal activities tomorrow.                         Written discharge instructions were provided to the                         patient.                         - Resume previous diet.                         - Continue present medications.                         - Repeat colonoscopy in 10 years for screening                         purposes.   Attending Participation:        I personally performed the entire procedure.   Srinivas Tena MD   8/7/2015 2:32:52 PM     EUS 7/5/2019  Impression:           - Normal esophagus.                         - Normal stomach.                         - Normal examined duodenum.                         - There was no sign of significant pathology in the                         entire main bile duct.                          - A cystic lesion was seen in the pancreatic tail.                         - No specimens collected.   Recommendation:       - Discharge patient to home.                         - Resume previous diet.                         - Continue present medications.                         - Return to referring physician at appointment to                         be scheduled.                         - ACR guideline would suggest to perform magnetic                         resonance imaging (MRI) with gadolinium in 2 years                         to monitor cyst   Attending Participation:        I personally performed the entire procedure.   Kev Calderon MD   7/5/2019 8:14:26 AM     Assessment:    1. History of Clostridioides difficile infection  - Ambulatory referral/consult to Gastroenterology    2. Excessive gas  - Lactulose Challenge Test; Future         Plan:  Recommended Kefir and fiber supplement to help improve gut microbiome. Last C. Diff negative. Not having frequent diarrhea so will defer stool testing. If symptoms return, did discuss colonoscopy, but will hold off at this time. She can consider noninvasive CRC screening through her PCP.   Will get lactulose challenge test. Can consider Rifaximin if positive.       Thank you for allowing me to participate in this patient's care.    Sincerely,     Danyell Huertas NP  Gastroenterology Department  [facility]               [1]   Current Outpatient Medications on File Prior to Visit   Medication Sig Dispense Refill    acetaminophen (TYLENOL) 500 MG tablet Take 2 tablets (1,000 mg total) by mouth 2 (two) times daily as needed for Pain. Begin post op day 3. 50 tablet 0    aspirin (ECOTRIN) 81 MG EC tablet Take 1 tablet (81 mg total) by mouth once daily. 90 tablet 3    buPROPion (WELLBUTRIN XL) 300 MG 24 hr tablet Take 1 tablet by mouth once daily.      estradioL (ESTRACE) 0.01 % (0.1 mg/gram) vaginal cream Use one gram nightly x 2  weeks then twice weekly 42.5 g 3    FLUoxetine 40 MG capsule Take 40 mg by mouth.      lisdexamfetamine (VYVANSE) 70 MG capsule Take 70 mg by mouth every morning.      losartan (COZAAR) 25 MG tablet TAKE 1 TABLET ONE TIME DAILY 90 tablet 3    measles, mumps and rubella vaccine (MMR) 1,000-12,500 TCID50/0.5 mL injection Inject into the skin. 1 each 0    melatonin (MELATIN) 5 mg Take 1 tablet by mouth every evening. (For insomnia)      methocarbamoL (ROBAXIN) 500 MG Tab One - two daily as needed for back pain 40 tablet 0    ondansetron (ZOFRAN-ODT) 4 MG TbDL Take 1 tablet (4 mg total) by mouth every 8 (eight) hours as needed (every 8-12 hrs prn nausea). 15 tablet 0    pravastatin (PRAVACHOL) 40 MG tablet TAKE 1 TABLET ONE TIME DAILY 90 tablet 1    PROLIA 60 mg/mL Syrg Inject 60 mg into the skin every 6 (six) months.      propranoloL (INDERAL) 20 MG tablet TAKE 1 TABLET TWICE DAILY 180 tablet 1    vibegron (GEMTESA) 75 mg Tab Take 1 tablet (75 mg total) by mouth Daily. 30 tablet 11     Current Facility-Administered Medications on File Prior to Visit   Medication Dose Route Frequency Provider Last Rate Last Admin    measles, mumps and rubella vaccine 1,000-12,500 TCID50/0.5 mL injection 0.5 mL  0.5 mL Subcutaneous 1 time in Clinic/HOD

## 2025-05-06 NOTE — PATIENT INSTRUCTIONS
Guidelines for Hydrogen Breath Test:    NPO (Nothing by mouth) for 12 hours prior to the test. Only water may be consumed.  Patients should avoid foods listed below 12 hours prior to the NPO request.  No smoking, including second-hand smoke, for at least 1 hour before or at any time during the test.  No sleeping or vigorous exercise for at least 1 hour before or at any time during the test  Recent antibiotic therapy, runny diarrhea or colonoscopy may affect these breath tests. Make sure to alert your physician or AMANDA if recent antibiotic use.   If any of the above conditions apply, rescheduling the patient will most likely be necessary.  Drinking water only during your breath test is allowed in moderation.    Even though you are NPO for 12 hours prior to test, it is also required to avoid certain foods at least another 12 ours prior to the NPO request beginning.     Listed below are generic avoidance groups and are not limited to what is listed. If you and/or the patient are uncertain if something may affect the test, DO NOT CONSUME the product and/or consult the physician.      Grain Products: Pastas, whole grain products (including cereals and junaid toast), brans, high-fiber cereals.  Fruits: Fruit juices, applesauce, apricots, bananas, cantaloupe, canned fruit cocktail, grapes, honeydew melon, peaches, watermelon. Raw and dried fruits like raisins and berries. Yogurt which contains fruit.   Vegetables: Vegetable juices, potatoes, alfalfa sprouts, beets, green/yellow beans, carrots, celery, cucumber, eggplant, lettuve, mushrooms, green/red peppers, squash, zucchini  Vegetables from cruciferous family: Brocolli, cauliflower, brussel sprouts, cabbage, kale, swiss chard, beans, lentils, corn, etc.   All Dairy Products: Milk, cheese, ice cream, yogurt, butter  Meats, Pastas, Corn or products that contain corn    --[employer] Gold Digest before meals that you can purchase on sougou.   --Miralax as needed as a stool  softener.  --Start fiber supplement.     [facility]  High Fiber Diet    20-30 grams of fiber per day is recommended    Fiber cereal = 5 grams (Raisin Bran, Shredded Wheat, Grape Nuts)  Konsyl 1 teaspoon = 6 grams  Metamucil 1 tablespoon = 3 grams  Citrucel 1 tablespoon = 2 grams  Fiber Choice = 3-4 per day    Drink at least 4-5 glasses of liquids per day or the fiber can be constipating rather then stimulating to your gut.  Boil and bake potatoes in their skin. Eat the skin, too.  Include fresh fruits and raw vegetables in your daily diet. Raw fruits and vegetables have more useful fiber than those that have been peeled, cooked, pureed, or otherwise processed.  Eat a wide variety of fibrous foods in reasonable amounts. Increase fiber intake slowly especially if you have been on a low-fiber diet.  Eat more legumes-peas, beans, soybeans.  For snacks, try dried fruit, whole wheat and rye crackers.  Avoid instant-cook hot cereals. Use the longer cooking cereals.  Use bran whenever possible. Sprinkle it on top of cereal, mix it into mashed potatoes or hamburger meat, or use it in combination dishes such as meat loaf.   Substitute whole grain, whole wheat and bran products for white flour products.  Eat slowly and chew your food thoroughly.    Psyllium has been shown to improve both constipation and diarrhea. Fiber may increase bulk of stool and may also include alterations in the production of gaseous fermentation products and changes to the gut microbiome. As some patients may experience increased bloating and gas, we suggest a low starting dose of psyllium that provides approximately 3 to 4 grams of soluble fiber per day. The soluble fiber content of psyllium products (ie, per packet, teaspoon, or pill) varies widely; refer to product-specific label to determine dose. The dose should then be slowly titrated up based on response to treatment.     Foods High in Fiber    This diet furnishes adequate amounts  of all the essential nutrients needed by the body and a very liberal fiber or roughage content. Roughage is indigestible fiber found in fruits, vegetables and whole grain cereals. It provides bulk to the large intestine and, accompanied by an adequate fluid intake, is a stimulant to elimination. Regular eating and elimination habits are vital to good health.     Fruits:  Use all fruits and juices liberally; fresh, cooked, dried or canned. Eat fruit raw and with skins when possible. Have at least four servings of fruit daily including a citrus fruit and a stewed dried fruit. Hard seeds of fruits (berries, figs, Grapes, mangoes, tomatoes) etc. may be removed.    Vegetables: Use all vegetables liberally. Green leafy vegetables, such as cabbage, spinach, lettuce, broccoli, and other greens are particularly good.    Potato: As desired. Serve baked frequently and eat the skin. Other starchy foods such as rice, macaroni, etc., may be occasionally substituted. Chew popcorn well and do not eat hard kernels.    Meat, Fish, Poultry: One or two servings daily.    Eggs: One daily if you are not on a low cholesterol diet.    Milk: Include at least one-half pint daily. Whole milk or skimmed may be used.     Cereals: Use whole grain breads and cereals such as bran, bran flakes, grape nut flakes, puffed wheat, oatmeal, Wheaties, etc., as much as possible. Refined breaks and cereals are not restricted; however, they do not contain the bulk necessary for this diet.     Sugars, Sweets: Use very moderately. Depend on fruit as dessert.    Fats: Use butter or margarine as desired. Oil or dressing on salads as desired. Fried foods may be used in moderation. Nuts may be eaten if you chew them well and may be ground or finely chopped for cooking.   Sample Menu                                                                                 Breakfast                          Lunch  Orange juice, 4 ounces                                                 Vegetable soup                    Stewed fruit                                                                 Fresh fruit plate with cottage cheese  Shredded wheat                                                           Whole wheat toast  Scrambled eggs                                                           Butter  Whole wheat toast                                                       Coffee or tea  Dinner                                                                         Bedtime  Large glass tomato juice                                             1 glass milk  Roast beef                                                                   stewed fruit  Baked potato with skin  Buttered spinach  Raw vegetable salad  Baked apple with skin   Coffee or tea

## 2025-05-20 ENCOUNTER — PATIENT MESSAGE (OUTPATIENT)
Dept: NEUROLOGY | Facility: CLINIC | Age: 75
End: 2025-05-20
Payer: MEDICARE

## 2025-05-21 ENCOUNTER — OFFICE VISIT (OUTPATIENT)
Dept: SLEEP MEDICINE | Facility: CLINIC | Age: 75
End: 2025-05-21
Payer: MEDICARE

## 2025-05-21 VITALS
WEIGHT: 132.63 LBS | HEIGHT: 68 IN | BODY MASS INDEX: 20.1 KG/M2 | HEART RATE: 70 BPM | SYSTOLIC BLOOD PRESSURE: 108 MMHG | DIASTOLIC BLOOD PRESSURE: 71 MMHG

## 2025-05-21 DIAGNOSIS — G47.33 OBSTRUCTIVE SLEEP APNEA: Primary | ICD-10-CM

## 2025-05-21 PROCEDURE — 3074F SYST BP LT 130 MM HG: CPT | Mod: CPTII,HCNC,S$GLB, | Performed by: NURSE PRACTITIONER

## 2025-05-21 PROCEDURE — 1157F ADVNC CARE PLAN IN RCRD: CPT | Mod: CPTII,HCNC,S$GLB, | Performed by: NURSE PRACTITIONER

## 2025-05-21 PROCEDURE — 1160F RVW MEDS BY RX/DR IN RCRD: CPT | Mod: CPTII,HCNC,S$GLB, | Performed by: NURSE PRACTITIONER

## 2025-05-21 PROCEDURE — 3288F FALL RISK ASSESSMENT DOCD: CPT | Mod: CPTII,HCNC,S$GLB, | Performed by: NURSE PRACTITIONER

## 2025-05-21 PROCEDURE — 99999 PR PBB SHADOW E&M-EST. PATIENT-LVL III: CPT | Mod: PBBFAC,HCNC,, | Performed by: NURSE PRACTITIONER

## 2025-05-21 PROCEDURE — 1100F PTFALLS ASSESS-DOCD GE2>/YR: CPT | Mod: CPTII,HCNC,S$GLB, | Performed by: NURSE PRACTITIONER

## 2025-05-21 PROCEDURE — 1159F MED LIST DOCD IN RCRD: CPT | Mod: CPTII,HCNC,S$GLB, | Performed by: NURSE PRACTITIONER

## 2025-05-21 PROCEDURE — 3078F DIAST BP <80 MM HG: CPT | Mod: CPTII,HCNC,S$GLB, | Performed by: NURSE PRACTITIONER

## 2025-05-21 PROCEDURE — 1126F AMNT PAIN NOTED NONE PRSNT: CPT | Mod: CPTII,HCNC,S$GLB, | Performed by: NURSE PRACTITIONER

## 2025-05-21 PROCEDURE — 99214 OFFICE O/P EST MOD 30 MIN: CPT | Mod: HCNC,S$GLB,, | Performed by: NURSE PRACTITIONER

## 2025-05-21 NOTE — PROGRESS NOTES
"  ESTABLISHED PATIENT VISIT    Manisha Wynne  is a pleasant 74 y.o. female established with the [facility]    Here today for:  follow-up     Since last visit:   See assessment below      Past Medical History:   Diagnosis Date    Abnormal Pap smear     Atrial fibrillation     AV block, 1st degree 07/25/2012    C. difficile diarrhea     RESOLVED    Cataract     Depression 07/24/2012    Facet arthritis of lumbar region 03/31/2015    Falls     3 falls in the last 6 mos--noted 6/19/19    Hyperlipidemia     Hypertension 07/24/2012    Neuropathy     Other specified cardiac dysrhythmias(427.89)     Sleep apnea     Syncope 07/24/2012    Tremors of nervous system     hands bilaterally     Problem List[1]  Current Medications[2]       Vitals:    05/21/25 1112   BP: 108/71   BP Location: Left arm   Pulse: 70   Weight: 60.2 kg (132 lb 9.7 oz)   Height: 5' 8" (1.727 m)     Physical Exam:    GEN:   Well-appearing  Psych:  Appropriate affect, demonstrates insight  SKIN:  No rash on the face or bridge of the nose      LABS:   Lab Results   Component Value Date    HGB 10.7 (L) 04/17/2025    CO2 23 04/17/2025         RECORDS REVIEWED:    PSG 3.22.16- AHI 27.8, jimena 88% (188lbs)  PAP 6.7.16 - improved with 8-12, no effective pressure determined. Oral leaks noted (188lbs)    ASSESSMENT    Sig PMH:  PROBLEM DESCRIPTION/ Sx on Presentation Interval Hx STATUS PLAN     CEDRIC   Presentation:   LOV: Kapoor 11.30.18 - f/u on CEDRIC. Rx pap 11-18. [facility].    PAP history   Dx Study PSG 3.22.16- AHI 27.8, jimena 88% (188lbs)   Machine age Dreamstation 2016   Mask    DME [facility]   [employer]    PAP altn    Benefits    PROBS         Since last visit:       Reports occasional somnoliquy and singing in her sleep (reported by a roommate at a conference).    Received replacement machine from [employer] Recall only recently. Needs pressure settings set. Has been off of CPAP for several years (since recall).    Needs mask and supplies.       "   uncontrolled   PAP PLAN   E min 11 cwp    I max 18 cwp    PS/epr    RAMP 5 x 10 minutes   Changes      Wants to restart using her machine.  Machine settings changed from 5-20 to 11-18 (last settings noted from 2018)    Rx supplies    RTC in 2-3 months for re-evaluation of CPAP and current tx settings.           Daytime Sx     ESS 11/24 on 3.17.16      ESS 10/24 05/21/2025     stable   -will reassess sleepiness after CEDRIC is adequately controlled     Insomnia     PRIOR SLEEP HISTORY:     Patient reports consistent difficulty getting to work on time. As a result of work up, a sleep study was ordered. It was positive for obstructive sleep apnea. She was directed here for further care.     Sleep symptoms for past 5-6 years:  Difficulty falling asleep at night, at times, particularly if worry. This can delay sleep onset by 2 hours.  Wakes once for bathroom.  Sometimes cramps in her legs.  Alarms go off at 6:15-6:30 am  Greatest difficulty is waking up in the morning. Even if she gets up on time, she may sit on the bed and accidentally fall back asleep. This has resulted in tardiness to work.     Prior tendency to be a night owl, but lately difficulty staying up late.      SLEEP SCHEDULE   Duration    Wind- down    Envmnt    CBTi    Meds prior    Meds now    Bed Time 11pm-3am   Lights out    Latency 15min-1hr   Arousals 1-2   Back to sleep 5-15min   Stim. 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                uncontrolled         -will reassess sleepiness after CEDRIC is adequately controlled     Nocturia     X 1 per sleep period    x 1-2 per sleep period   unclear          RTC:  in 2-3 months                [1]   Patient Active Problem List  Diagnosis    Mild episode of recurrent major depressive disorder    Essential hypertension    Atrial fibrillation    SSS (sick sinus syndrome)    AV block, 1st degree    Dyslipidemia    Cardiac  pacemaker in situ    Abnormal Pap smear of cervix    Lumbar spinal stenosis    Lumbar radiculopathy    Facet arthritis of lumbar region    Sacroiliac joint dysfunction    Anemia, normocytic normochromic    CEDRIC (obstructive sleep apnea)    RBBB    Stage 3a chronic kidney disease    Primary hyperparathyroidism    Other osteoporosis without current pathological fracture    Atrophic pancreas    Sensory peripheral neuropathy    Nasal deformity    Closed fracture of left distal radius    Wrist pain, left    Range of motion deficit    Sensorineural hearing loss (SNHL) of both ears    Back pain    Disease of spinal cord, unspecified    Recurrent Clostridioides difficile diarrhea    Goiter, nontoxic, multinodular    Mixed stress and urge incontinence    Urinary frequency    Vaginal atrophy    Nocturia more than twice per night    History of chronic constipation    At risk for falls    Lack of coordination    Elevated liver enzymes    Nuclear sclerotic cataract of left eye    Other specified disorders of arteries and arterioles    Gait instability    Pain in neck    S/P cervical spinal fusion    Aortic calcification    Atherosclerosis of aorta    Other specified disorders involving the immune mechanism, not elsewhere classified    Thrombocytopenia, unspecified    Fall    Closed fracture of olecranon process of right ulna    Cervical myelopathy    S/P ORIF (open reduction internal fixation) fracture    Elbow pain, right    Balance problem    Meningioma    Major depressive disorder, recurrent, moderate   [2]   Current Outpatient Medications:     acetaminophen (TYLENOL) 500 MG tablet, Take 2 tablets (1,000 mg total) by mouth 2 (two) times daily as needed for Pain. Begin post op day 3., Disp: 50 tablet, Rfl: 0    aspirin (ECOTRIN) 81 MG EC tablet, Take 1 tablet (81 mg total) by mouth once daily., Disp: 90 tablet, Rfl: 3    buPROPion (WELLBUTRIN XL) 300 MG 24 hr tablet, Take 1 tablet by mouth once daily., Disp: , Rfl:      estradioL (ESTRACE) 0.01 % (0.1 mg/gram) vaginal cream, Use one gram nightly x 2 weeks then twice weekly, Disp: 42.5 g, Rfl: 3    FLUoxetine 40 MG capsule, Take 40 mg by mouth., Disp: , Rfl:     lisdexamfetamine (VYVANSE) 70 MG capsule, Take 70 mg by mouth every morning., Disp: , Rfl:     losartan (COZAAR) 25 MG tablet, TAKE 1 TABLET ONE TIME DAILY, Disp: 90 tablet, Rfl: 3    measles, mumps and rubella vaccine (MMR) 1,000-12,500 TCID50/0.5 mL injection, Inject into the skin., Disp: 1 each, Rfl: 0    melatonin (MELATIN) 5 mg, Take 1 tablet by mouth every evening. (For insomnia), Disp: , Rfl:     methocarbamoL (ROBAXIN) 500 MG Tab, One - two daily as needed for back pain, Disp: 40 tablet, Rfl: 0    ondansetron (ZOFRAN-ODT) 4 MG TbDL, Take 1 tablet (4 mg total) by mouth every 8 (eight) hours as needed (every 8-12 hrs prn nausea)., Disp: 15 tablet, Rfl: 0    pravastatin (PRAVACHOL) 40 MG tablet, TAKE 1 TABLET ONE TIME DAILY, Disp: 90 tablet, Rfl: 1    PROLIA 60 mg/mL Syrg, Inject 60 mg into the skin every 6 (six) months., Disp: , Rfl:     propranoloL (INDERAL) 20 MG tablet, TAKE 1 TABLET TWICE DAILY, Disp: 180 tablet, Rfl: 1    vibegron (GEMTESA) 75 mg Tab, Take 1 tablet (75 mg total) by mouth Daily., Disp: 30 tablet, Rfl: 11    Current Facility-Administered Medications:     measles, mumps and rubella vaccine 1,000-12,500 TCID50/0.5 mL injection 0.5 mL, 0.5 mL, Subcutaneous, 1 time in Clinic/HOD,

## 2025-05-23 ENCOUNTER — HOSPITAL ENCOUNTER (EMERGENCY)
Facility: HOSPITAL | Age: 75
Discharge: HOME | End: 2025-05-24
Attending: EMERGENCY MEDICINE
Payer: MEDICARE

## 2025-05-23 DIAGNOSIS — S59.909A ELBOW INJURY: ICD-10-CM

## 2025-05-23 DIAGNOSIS — S09.90XA INJURY OF HEAD, INITIAL ENCOUNTER: ICD-10-CM
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
## 2025-05-23 DIAGNOSIS — S01.01XA LACERATION OF SCALP, INITIAL ENCOUNTER: ICD-10-CM

## 2025-05-23 PROBLEM — N18.31 STAGE 3A CHRONIC KIDNEY DISEASE: Chronic | Status: ACTIVE | Noted: 2019-01-17

## 2025-05-23 PROBLEM — Z98.1 S/P CERVICAL SPINAL FUSION: Chronic | Status: ACTIVE | Noted: 2023-05-05

## 2025-05-23 PROBLEM — D32.9 MENINGIOMA: Chronic | Status: ACTIVE | Noted: 2025-02-10

## 2025-05-23 PROCEDURE — 12001 RPR S/N/AX/GEN/TRNK 2.5CM/<: CPT

## 2025-05-23 PROCEDURE — 99285 EMERGENCY DEPT VISIT HI MDM: CPT | Mod: 25

## 2025-05-23 PROCEDURE — 25000003 PHARM REV CODE 250: Mod: HCNC | Performed by: EMERGENCY MEDICINE

## 2025-05-23 RX ADMIN — Medication 1 ML: at 11:05

## 2025-05-24 VITALS
HEIGHT: 68 IN | SYSTOLIC BLOOD PRESSURE: 164 MMHG | WEIGHT: 132 LBS | OXYGEN SATURATION: 98 % | DIASTOLIC BLOOD PRESSURE: 74 MMHG | BODY MASS INDEX: 20 KG/M2 | TEMPERATURE: 98 F | HEART RATE: 60 BPM | RESPIRATION RATE: 16 BRPM

## 2025-05-27 ENCOUNTER — PATIENT OUTREACH (OUTPATIENT)
Facility: OTHER | Age: 75
End: 2025-05-27
Payer: MEDICARE

## 2025-05-28 NOTE — PROGRESS NOTES
Phoned patient to assist with Post ED Discharge Navigation. Patient was unavailable. Message left on voice mail. Encounter closed.  Maureen Guerra

## 2025-05-29 ENCOUNTER — PATIENT OUTREACH (OUTPATIENT)
Facility: OTHER | Age: 75
End: 2025-05-29
Payer: MEDICARE

## 2025-05-29 NOTE — PROGRESS NOTES
Patient was seen in the ED on 5/23/25. Phoned patient to assist with Post ED Discharge Navigation. Patient agreed to assistance scheduling a PCP follow-up appointment. In Basket message sent to PCP staff for scheduling assistance.  Maureen Guerra

## 2025-05-30 ENCOUNTER — HOSPITAL ENCOUNTER (EMERGENCY)
Facility: OTHER | Age: 75
Discharge: HOME | End: 2025-05-30
Attending: EMERGENCY MEDICINE
Payer: MEDICARE

## 2025-05-30 ENCOUNTER — TELEPHONE (OUTPATIENT)
Dept: INTERNAL MEDICINE | Facility: CLINIC | Age: 75
End: 2025-05-30
Payer: MEDICARE

## 2025-05-30 VITALS
OXYGEN SATURATION: 96 % | TEMPERATURE: 98 F | BODY MASS INDEX: 20 KG/M2 | HEIGHT: 68 IN | WEIGHT: 132 LBS | DIASTOLIC BLOOD PRESSURE: 70 MMHG | RESPIRATION RATE: 18 BRPM | SYSTOLIC BLOOD PRESSURE: 113 MMHG | HEART RATE: 62 BPM

## 2025-05-30 DIAGNOSIS — Z48.02 REMOVAL OF STAPLE: Primary | ICD-10-CM

## 2025-05-30 PROCEDURE — 99999 HC NO LEVEL OF SERVICE - ED ONLY: CPT | Mod: HCNC

## 2025-05-30 NOTE — ED PROVIDER NOTES
Encounter Date: 5/30/2025       History     Chief Complaint   Patient presents with    Suture / Staple Removal     Two staples placed 5/23, instructed to come back in a week for removal. Denies any problems with area. Dried blood noted to site.      74-year-old female with no pertinent past medical history presents for staple removal to the back of her head.  States she had 2 staples placed 1 week ago.  Denies any complaints at this time    The history is provided by the patient.     Review of patient's allergies indicates:  No Known Allergies  Past Medical History:   Diagnosis Date    Abnormal Pap smear     Atrial fibrillation     AV block, 1st degree 07/25/2012    C. difficile diarrhea     RESOLVED    Cataract     Depression 07/24/2012    Facet arthritis of lumbar region 03/31/2015    Falls     3 falls in the last 6 mos--noted 6/19/19    Hyperlipidemia     Hypertension 07/24/2012    Neuropathy     Other specified cardiac dysrhythmias(427.89)     Sleep apnea     Syncope 07/24/2012    Tremors of nervous system     hands bilaterally     Past Surgical History:   Procedure Laterality Date    APPLICATION OF CARTILAGE GRAFT Bilateral 12/19/2019    Procedure: APPLICATION, CARTILAGE GRAFT AURICULAR JEZ;  Surgeon: Martinez Flores III, MD;  Location: [facility];  Service: ENT;  Laterality: Bilateral;    CARDIAC PACEMAKER PLACEMENT  09/07/2012    [id number]    CATARACT EXTRACTION W/  INTRAOCULAR LENS IMPLANT Right 5/16/2022    Procedure: EXTRACTION, CATARACT, WITH IOL INSERTION;  Surgeon: Ines Aguilera MD;  Location: [facility] OR;  Service: Ophthalmology;  Laterality: Right;  Catalys     CATARACT EXTRACTION W/  INTRAOCULAR LENS IMPLANT Left 6/20/2022    Procedure: EXTRACTION, CATARACT, WITH IOL INSERTION;  Surgeon: Ines Aguilera MD;  Location: [facility] OR;  Service: Ophthalmology;  Laterality: Left;  Catalys     DILATION AND CURETTAGE OF UTERUS      Hx of precancerous cells?    ENDOSCOPIC ULTRASOUND OF UPPER  GASTROINTESTINAL TRACT N/A 7/5/2019    Procedure: ULTRASOUND, UPPER GI TRACT, ENDOSCOPIC;  Surgeon: Kev Calderon MD;  Location: [facility] ENDO (2ND FLR);  Service: Endoscopy;  Laterality: N/A;  Pacemaker-Adam   appt confirmed-rb    MYELOGRAPHY N/A 5/4/2021    Procedure: Myelogram  CERVICAL, THORACIC AND LUMBAR;  Surgeon: [facility] Diagnostic Provider;  Location: [facility] OR 2ND FLR;  Service: Radiology;  Laterality: N/A;    NASAL SEPTOPLASTY N/A 12/19/2019    Procedure: SEPTOPLASTY, NOSE;  Surgeon: Martinez Flores III, MD;  Location: [facility] OR;  Service: ENT;  Laterality: N/A;  FOLLOW DR SALVATORE KIMBROUGH PROTOCOL    OPEN REDUCTION AND INTERNAL FIXATION (ORIF) OF FRACTURE OF DISTAL RADIUS Left 1/24/2020    Procedure: ORIF, FRACTURE, RADIUS, DISTAL-left;  Surgeon: Ellen Moe MD;  Location: [facility] OR;  Service: Orthopedics;  Laterality: Left;    OPEN REDUCTION AND INTERNAL FIXATION (ORIF) OF INJURY OF ELBOW Right 2/28/2024    Procedure: ORIF, ELBOW;  Surgeon: Ellen Moe MD;  Location: [facility] OR;  Service: Orthopedics;  Laterality: Right;    POSTERIOR FUSION OF CERVICAL SPINE WITH LAMINECTOMY N/A 11/14/2022    Procedure: LAMINECTOMY, SPINE, CERVICAL, WITH POSTERIOR FUSION C3-C6;  Surgeon: Nicolette Cheek MD;  Location: [facility] 2ND FLR;  Service: Neurosurgery;  Laterality: N/A;  [place] III, ASA III, BLOOD TYPE AND SCREEN, NEUROMONITORING EMG SEP MEP, BRACE/HALO [place], BED REGULAR BED, HEADREST [place], POSITION PRONE, SPECIAL EQUIPMENT NIKI MIDDLETON, RADIOLOGY C-ARM, EXT. BONE STIMULATOR BIOMET    REPLACEMENT OF PACEMAKER GENERATOR Left 10/7/2022    Procedure: REPLACEMENT, PACEMAKER GENERATOR;  Surgeon: John Sheets MD;  Location: [facility] EP LAB;  Service: Cardiology;  Laterality: Left;  YAS, SSS, AVB, Dual PPM Gen Change,SJM, MAC ,NH, 3 prep,** Adam dcPPM in situ**    SURGICAL REMOVAL OF NASAL TURBINATE Bilateral 12/19/2019    Procedure: EXCISION, NASAL TURBINATE;  Surgeon: Martinez Flores III, MD;   Location: [facility] OR;  Service: ENT;  Laterality: Bilateral;    TONSILLECTOMY      WISDOM TOOTH EXTRACTION       Family History   Adopted: Yes   Problem Relation Name Age of Onset    Amblyopia Neg Hx      Blindness Neg Hx      Cataracts Neg Hx      Glaucoma Neg Hx      Macular degeneration Neg Hx      Retinal detachment Neg Hx       Social History[1]  Review of Systems  Per hpi  Physical Exam     Initial Vitals [05/30/25 1624]   BP Pulse Resp Temp SpO2   113/70 62 18 98 °F (36.7 °C) 96 %      MAP       --         Physical Exam    Nursing note and vitals reviewed.  HENT:   Head: Normocephalic and atraumatic.   Two staples noted to superior head.  No surrounding erythema, edema or discharge   Eyes: Conjunctivae are normal.     Neurological: GCS score is 15. GCS eye subscore is 4. GCS verbal subscore is 5. GCS motor subscore is 6.         ED Course   Staple Removal    Date/Time: 5/30/2025 4:48 PM  Location procedure was performed: [facility] EMERGENCY DEPARTMENT    Performed by: Kaylie Portillo PA-C  Authorized by: Stephen Ozuna MD  Body area: head/neck  Location details: scalp  Wound Appearance: clean, well healed and no drainage  Sutures Removed: 0  Staples Removed: 2  Post-removal: no dressing applied  Facility: sutures placed in this facility  Patient tolerance: Patient tolerated the procedure well with no immediate complications        Labs Reviewed - No data to display       Imaging Results    None          Medications - No data to display  Medical Decision Making  Evaluation of a well-appearing 74-year-old female for staple removal.  Vital signs stable.  Patient tolerated procedure well without any acute issues.  Stable for discharge.    Differential diagnosis:  Staple removal    Problems Addressed:  Removal of staple: acute illness or injury                                      Clinical Impression:  Final diagnoses:  [Z48.02] Removal of staple (Primary)          ED Disposition Condition    Discharge Stable           ED Prescriptions    None       Follow-up Information    None       Launch MDCalc MDM  MDCalc MDM Module  May 30 2025 4:48 PM [Kaylie Portillo]             [1]   Social History  Tobacco Use    Smoking status: Former     Current packs/day: 0.00     Average packs/day: 0.3 packs/day for 15.0 years (3.8 ttl pk-yrs)     Types: Cigarettes     Start date: 1967     Quit date: 1982     Years since quittin.8    Smokeless tobacco: Former   Substance Use Topics    Alcohol use: Yes     Comment: no daily or heavy use, ~4 times per month    Drug use: No        Kaylie Portillo PA-C  [phone]

## 2025-05-30 NOTE — TELEPHONE ENCOUNTER
Called pt, offered appt with PCP's NP. Explained need to be within 7 days in compliance with Medicare. Pt declined, says she prefers to see Dr. Blue. Slot held 6/10 10:30 pending override

## 2025-05-30 NOTE — TELEPHONE ENCOUNTER
----- Message from Maureen Guerra sent at 5/29/2025 10:55 AM CDT -----  Regarding: ED F/U  Good morning,This pt was discharged from the ED 5/23/25 on  and has orders to follow up with Dr Blue. In compliance with Medicare 2+ Chronic Condition patient measure mandates, this patient requires a follow up appt within 7 days of ED visit d/c date. I am requesting scheduling assistance as you are booked, and I am unable to schedule pt an appt within 7 days. Thank you for your assistance. Please advise of appt date and time so that I can set an appt reminder and confirm with patient.Maureen Guerra

## 2025-06-02 DIAGNOSIS — G57.93 UNSPECIFIED MONONEUROPATHY OF BILATERAL LOWER LIMBS: Primary | ICD-10-CM

## 2025-06-09 ENCOUNTER — PROCEDURE VISIT (OUTPATIENT)
Dept: PODIATRY | Facility: CLINIC | Age: 75
End: 2025-06-09
Payer: MEDICARE

## 2025-06-09 VITALS
HEART RATE: 66 BPM | SYSTOLIC BLOOD PRESSURE: 124 MMHG | BODY MASS INDEX: 20.54 KG/M2 | WEIGHT: 135.5 LBS | HEIGHT: 68 IN | DIASTOLIC BLOOD PRESSURE: 69 MMHG

## 2025-06-09 DIAGNOSIS — M20.41 HAMMER TOES OF BOTH FEET: Primary | ICD-10-CM

## 2025-06-09 DIAGNOSIS — M20.42 HAMMER TOES OF BOTH FEET: Primary | ICD-10-CM

## 2025-06-09 DIAGNOSIS — M79.672 LEFT FOOT PAIN: ICD-10-CM

## 2025-06-09 PROCEDURE — 99213 OFFICE O/P EST LOW 20 MIN: CPT | Mod: 57,HCNC,S$GLB, | Performed by: PODIATRIST

## 2025-06-09 PROCEDURE — 28011 INCISION OF TOE TENDONS: CPT | Mod: T1,T2,T3,HCNC | Performed by: PODIATRIST

## 2025-06-09 RX ORDER — OXYCODONE AND ACETAMINOPHEN 5; 325 MG/1; MG/1
1 TABLET ORAL EVERY 6 HOURS PRN
Qty: 30 TABLET | Refills: 0 | Status: SHIPPED | OUTPATIENT
Start: 2025-06-09

## 2025-06-09 RX ORDER — CEPHALEXIN 250 MG/1
250 CAPSULE ORAL EVERY 12 HOURS
Qty: 14 CAPSULE | Refills: 0 | Status: SHIPPED | OUTPATIENT
Start: 2025-06-09

## 2025-06-10 ENCOUNTER — OFFICE VISIT (OUTPATIENT)
Dept: INTERNAL MEDICINE | Facility: CLINIC | Age: 75
End: 2025-06-10
Payer: MEDICARE

## 2025-06-10 VITALS
BODY MASS INDEX: 20.36 KG/M2 | OXYGEN SATURATION: 100 % | DIASTOLIC BLOOD PRESSURE: 58 MMHG | HEART RATE: 60 BPM | WEIGHT: 134.38 LBS | HEIGHT: 68 IN | SYSTOLIC BLOOD PRESSURE: 110 MMHG

## 2025-06-10 DIAGNOSIS — E53.8 DEFICIENCY OF OTHER SPECIFIED B GROUP VITAMINS: ICD-10-CM

## 2025-06-10 DIAGNOSIS — S09.90XA HEAD TRAUMA, INITIAL ENCOUNTER: ICD-10-CM

## 2025-06-10 DIAGNOSIS — E78.5 HYPERLIPIDEMIA, UNSPECIFIED HYPERLIPIDEMIA TYPE: Primary | ICD-10-CM

## 2025-06-10 DIAGNOSIS — Z12.31 SCREENING MAMMOGRAM, ENCOUNTER FOR: ICD-10-CM

## 2025-06-10 DIAGNOSIS — Z12.11 SCREENING FOR COLON CANCER: ICD-10-CM

## 2025-06-10 DIAGNOSIS — D64.9 ANEMIA, UNSPECIFIED TYPE: ICD-10-CM

## 2025-06-10 PROCEDURE — 3288F FALL RISK ASSESSMENT DOCD: CPT | Mod: CPTII,HCNC,S$GLB, | Performed by: INTERNAL MEDICINE

## 2025-06-10 PROCEDURE — 1157F ADVNC CARE PLAN IN RCRD: CPT | Mod: CPTII,HCNC,S$GLB, | Performed by: INTERNAL MEDICINE

## 2025-06-10 PROCEDURE — 1100F PTFALLS ASSESS-DOCD GE2>/YR: CPT | Mod: CPTII,HCNC,S$GLB, | Performed by: INTERNAL MEDICINE

## 2025-06-10 PROCEDURE — 3074F SYST BP LT 130 MM HG: CPT | Mod: CPTII,HCNC,S$GLB, | Performed by: INTERNAL MEDICINE

## 2025-06-10 PROCEDURE — 1125F AMNT PAIN NOTED PAIN PRSNT: CPT | Mod: CPTII,HCNC,S$GLB, | Performed by: INTERNAL MEDICINE

## 2025-06-10 PROCEDURE — 99214 OFFICE O/P EST MOD 30 MIN: CPT | Mod: HCNC,S$GLB,, | Performed by: INTERNAL MEDICINE

## 2025-06-10 PROCEDURE — 3078F DIAST BP <80 MM HG: CPT | Mod: CPTII,HCNC,S$GLB, | Performed by: INTERNAL MEDICINE

## 2025-06-10 PROCEDURE — 99999 PR PBB SHADOW E&M-EST. PATIENT-LVL V: CPT | Mod: PBBFAC,HCNC,, | Performed by: INTERNAL MEDICINE

## 2025-06-12 ENCOUNTER — PATIENT MESSAGE (OUTPATIENT)
Dept: INTERNAL MEDICINE | Facility: CLINIC | Age: 75
End: 2025-06-12
Payer: MEDICARE

## 2025-06-13 ENCOUNTER — CLINICAL SUPPORT (OUTPATIENT)
Dept: ENDOSCOPY | Facility: HOSPITAL | Age: 75
End: 2025-06-13
Attending: INTERNAL MEDICINE
Payer: MEDICARE

## 2025-06-13 ENCOUNTER — TELEPHONE (OUTPATIENT)
Dept: ENDOSCOPY | Facility: HOSPITAL | Age: 75
End: 2025-06-13

## 2025-06-13 DIAGNOSIS — Z12.11 SCREENING FOR COLON CANCER: ICD-10-CM

## 2025-06-13 DIAGNOSIS — Z12.11 SCREENING FOR COLON CANCER: Primary | ICD-10-CM

## 2025-06-13 NOTE — TELEPHONE ENCOUNTER
Patient is scheduled for a Colonoscopy on 8/20/25 with   Referral for procedure from Telephone call

## 2025-06-14 ENCOUNTER — CLINICAL SUPPORT (OUTPATIENT)
Dept: ENDOSCOPY | Facility: HOSPITAL | Age: 75
End: 2025-06-14
Attending: INTERNAL MEDICINE
Payer: MEDICARE

## 2025-06-14 DIAGNOSIS — Z12.11 SCREENING FOR COLON CANCER: ICD-10-CM

## 2025-06-14 NOTE — PROCEDURES
Subjective:      Patient ID: Manisha Wynne is a 74 y.o. female.    Chief Complaint:   Procedure (Tenotomy/2nd, 3rd, and 4th digit)    Manisha is a 74 y.o. female who presents to the clinic for evaluation and treatment of feet  Manisha has a past medical history of Abnormal Pap smear, Atrial fibrillation, AV block, 1st degree (07/25/2012), C. difficile diarrhea, Cataract, Depression (07/24/2012), Facet arthritis of lumbar region (03/31/2015), Falls, Hyperlipidemia, Hypertension (07/24/2012), Neuropathy, Other specified cardiac dysrhythmias(427.89), Sleep apnea, Syncope (07/24/2012), and Tremors of nervous system.  .  This patient has documented high risk feet requiring routine maintenance secondary to peripheral neuropathy.    Patient returns to clinic for foot evaluation/c/o pain  Doing very well after hammertoe/study procedures right foot.  She would like to discuss similar procedure to the left side.     PCP: Iris Blue MD    Date Last Seen by PCP 5/3/24    Current shoe gear:  Affected Foot : bacilio Snyder        Hemoglobin A1C   Date Value Ref Range Status   11/16/2023 5.5 4.0 - 5.6 % Final     Comment:     ADA Screening Guidelines:  5.7-6.4%  Consistent with prediabetes  >or=6.5%  Consistent with diabetes    High levels of fetal hemoglobin interfere with the HbA1C  assay. Heterozygous hemoglobin variants (HbS, HgC, etc)do  not significantly interfere with this assay.   However, presence of multiple variants may affect accuracy.     08/05/2022 5.3 4.0 - 5.6 % Final     Comment:     ADA Screening Guidelines:  5.7-6.4%  Consistent with prediabetes  >or=6.5%  Consistent with diabetes    High levels of fetal hemoglobin interfere with the HbA1C  assay. Heterozygous hemoglobin variants (HbS, HgC, etc)do  not significantly interfere with this assay.   However, presence of multiple variants may affect accuracy.     06/10/2021 5.4 4.0 - 5.6 % Final     Comment:     ADA Screening Guidelines:  5.7-6.4%   Consistent with prediabetes  >or=6.5%  Consistent with diabetes    High levels of fetal hemoglobin interfere with the HbA1C  assay. Heterozygous hemoglobin variants (HbS, HgC, etc)do  not significantly interfere with this assay.   However, presence of multiple variants may affect accuracy.     06/10/2021 5.4 4.0 - 5.6 % Final     Comment:     ADA Screening Guidelines:  5.7-6.4%  Consistent with prediabetes  >or=6.5%  Consistent with diabetes    High levels of fetal hemoglobin interfere with the HbA1C  assay. Heterozygous hemoglobin variants (HbS, HgC, etc)do  not significantly interfere with this assay.   However, presence of multiple variants may affect accuracy.          Past Medical History:   Diagnosis Date    Abnormal Pap smear     Atrial fibrillation     AV block, 1st degree 07/25/2012    C. difficile diarrhea     RESOLVED    Cataract     Depression 07/24/2012    Facet arthritis of lumbar region 03/31/2015    Falls     3 falls in the last 6 mos--noted 6/19/19    Hyperlipidemia     Hypertension 07/24/2012    Neuropathy     Other specified cardiac dysrhythmias(427.89)     Sleep apnea     Syncope 07/24/2012    Tremors of nervous system     hands bilaterally     Past Surgical History:   Procedure Laterality Date    APPLICATION OF CARTILAGE GRAFT Bilateral 12/19/2019    Procedure: APPLICATION, CARTILAGE GRAFT AURICULAR JEZ;  Surgeon: Martinez Flores III, MD;  Location: [facility] OR;  Service: ENT;  Laterality: Bilateral;    CARDIAC PACEMAKER PLACEMENT  09/07/2012    [id number]    CATARACT EXTRACTION W/  INTRAOCULAR LENS IMPLANT Right 5/16/2022    Procedure: EXTRACTION, CATARACT, WITH IOL INSERTION;  Surgeon: Ines Aguilera MD;  Location: [facility] OR;  Service: Ophthalmology;  Laterality: Right;  Catalys     CATARACT EXTRACTION W/  INTRAOCULAR LENS IMPLANT Left 6/20/2022    Procedure: EXTRACTION, CATARACT, WITH IOL INSERTION;  Surgeon: Ines Aguilera MD;  Location: [facility] OR;  Service: Ophthalmology;   Laterality: Left;  Catalys     DILATION AND CURETTAGE OF UTERUS      Hx of precancerous cells?    ENDOSCOPIC ULTRASOUND OF UPPER GASTROINTESTINAL TRACT N/A 7/5/2019    Procedure: ULTRASOUND, UPPER GI TRACT, ENDOSCOPIC;  Surgeon: Kev Calderon MD;  Location: [facility] ENDO (2ND FLR);  Service: Endoscopy;  Laterality: N/A;  Pacemaker-Adam   appt confirmed-rb    MYELOGRAPHY N/A 5/4/2021    Procedure: Myelogram  CERVICAL, THORACIC AND LUMBAR;  Surgeon: [facility];  Location: [facility] OR 2ND FLR;  Service: Radiology;  Laterality: N/A;    NASAL SEPTOPLASTY N/A 12/19/2019    Procedure: SEPTOPLASTY, NOSE;  Surgeon: Martinez Flores III, MD;  Location: [facility] OR;  Service: ENT;  Laterality: N/A;  FOLLOW DR SALVATORE KIMBROUGH PROTOCOL    OPEN REDUCTION AND INTERNAL FIXATION (ORIF) OF FRACTURE OF DISTAL RADIUS Left 1/24/2020    Procedure: ORIF, FRACTURE, RADIUS, DISTAL-left;  Surgeon: Ellen Moe MD;  Location: [facility] OR;  Service: Orthopedics;  Laterality: Left;    OPEN REDUCTION AND INTERNAL FIXATION (ORIF) OF INJURY OF ELBOW Right 2/28/2024    Procedure: ORIF, ELBOW;  Surgeon: Ellen Moe MD;  Location: [facility] OR;  Service: Orthopedics;  Laterality: Right;    POSTERIOR FUSION OF CERVICAL SPINE WITH LAMINECTOMY N/A 11/14/2022    Procedure: LAMINECTOMY, SPINE, CERVICAL, WITH POSTERIOR FUSION C3-C6;  Surgeon: Nicolette Cheek MD;  Location: [facility] 2ND FLR;  Service: Neurosurgery;  Laterality: N/A;  TORONTO III, ASA III, BLOOD TYPE AND SCREEN, NEUROMONITORING EMG SEP MEP, BRACE/HALO [place], BED REGULAR BED, HEADREST [place], POSITION PRONE, SPECIAL EQUIPMENT NIKI MIDDLETON, RADIOLOGY C-ARM, EXT. BONE STIMULATOR BIOMET    REPLACEMENT OF PACEMAKER GENERATOR Left 10/7/2022    Procedure: REPLACEMENT, PACEMAKER GENERATOR;  Surgeon: John Sheets MD;  Location: [facility] EP LAB;  Service: Cardiology;  Laterality: Left;  YAS, SSS, AVB, Dual PPM Gen Change,SJM, MAC ,MS, 3 prep,** Adam dcPPM in situ**    SURGICAL  REMOVAL OF NASAL TURBINATE Bilateral 12/19/2019    Procedure: EXCISION, NASAL TURBINATE;  Surgeon: Martinez Flores III, MD;  Location: [facility];  Service: ENT;  Laterality: Bilateral;    TONSILLECTOMY      WISDOM TOOTH EXTRACTION       Current Outpatient Medications on File Prior to Visit   Medication Sig Dispense Refill    acetaminophen (TYLENOL) 500 MG tablet Take 2 tablets (1,000 mg total) by mouth 2 (two) times daily as needed for Pain. Begin post op day 3. 50 tablet 0    aspirin (ECOTRIN) 81 MG EC tablet Take 1 tablet (81 mg total) by mouth once daily. 90 tablet 3    buPROPion (WELLBUTRIN XL) 300 MG 24 hr tablet Take 1 tablet by mouth once daily.      estradioL (ESTRACE) 0.01 % (0.1 mg/gram) vaginal cream Use one gram nightly x 2 weeks then twice weekly 42.5 g 3    FLUoxetine 40 MG capsule Take 40 mg by mouth.      lisdexamfetamine (VYVANSE) 70 MG capsule Take 70 mg by mouth every morning.      losartan (COZAAR) 25 MG tablet TAKE 1 TABLET ONE TIME DAILY 90 tablet 3    measles, mumps and rubella vaccine (MMR) 1,000-12,500 TCID50/0.5 mL injection Inject into the skin. 1 each 0    melatonin (MELATIN) 5 mg Take 1 tablet by mouth every evening. (For insomnia)      methocarbamoL (ROBAXIN) 500 MG Tab One - two daily as needed for back pain 40 tablet 0    ondansetron (ZOFRAN-ODT) 4 MG TbDL Take 1 tablet (4 mg total) by mouth every 8 (eight) hours as needed (every 8-12 hrs prn nausea). 15 tablet 0    pravastatin (PRAVACHOL) 40 MG tablet TAKE 1 TABLET ONE TIME DAILY 90 tablet 1    PROLIA 60 mg/mL Syrg Inject 60 mg into the skin every 6 (six) months.      propranoloL (INDERAL) 20 MG tablet TAKE 1 TABLET TWICE DAILY 180 tablet 1    vibegron (GEMTESA) 75 mg Tab Take 1 tablet (75 mg total) by mouth Daily. 30 tablet 11     Current Facility-Administered Medications on File Prior to Visit   Medication Dose Route Frequency Provider Last Rate Last Admin    measles, mumps and rubella vaccine 1,000-12,500 TCID50/0.5 mL injection 0.5  "mL  0.5 mL Subcutaneous 1 time in Clinic/HOD          Review of patient's allergies indicates:  No Known Allergies    Review of Systems   Constitutional: Negative for chills, decreased appetite, fever, malaise/fatigue, night sweats, weight gain and weight loss.   Eyes:         Poor eyesight   Cardiovascular:  Negative for chest pain, claudication, dyspnea on exertion, leg swelling, palpitations and syncope.   Respiratory:  Negative for cough and shortness of breath.    Endocrine: Negative for cold intolerance and heat intolerance.   Hematologic/Lymphatic: Negative for bleeding problem. Does not bruise/bleed easily.   Skin:  Positive for nail changes. Negative for color change, dry skin, flushing, itching, poor wound healing, rash, skin cancer, suspicious lesions and unusual hair distribution.   Musculoskeletal:  Positive for arthritis, falls and stiffness. Negative for back pain, gout, joint pain, joint swelling, muscle cramps, muscle weakness, myalgias and neck pain.   Gastrointestinal:  Negative for diarrhea, nausea and vomiting.   Neurological:  Positive for numbness and paresthesias. Negative for dizziness, focal weakness, light-headedness, tremors, vertigo and weakness.   Psychiatric/Behavioral:  Negative for altered mental status and depression. The patient does not have insomnia.    Allergic/Immunologic: Negative.            Objective:       Vitals:    06/09/25 0916   BP: 124/69   Pulse: 66   Weight: 61.4 kg (135 lb 7.6 oz)   Height: 5' 8" (1.727 m)   PainSc:   9   PainLoc: Toe   61.4 kg (135 lb 7.6 oz) afeb    Physical Exam  Vitals reviewed.   Constitutional:       General: She is not in acute distress.     Appearance: She is well-developed. She is not ill-appearing, toxic-appearing or diaphoretic.      Comments: Proper supportive shoegear   Cardiovascular:      Pulses:           Dorsalis pedis pulses are 2+ on the right side and 2+ on the left side.        Posterior tibial pulses are 1+ on the right side " and 1+ on the left side.      Comments: No edema to digits  Musculoskeletal:         General: Deformity present.      Right lower leg: No edema.      Left lower leg: No edema.      Right foot: Decreased range of motion. Deformity and prominent metatarsal heads present. No tenderness or bony tenderness.      Left foot: Decreased range of motion. Deformity and prominent metatarsal heads present. No tenderness or bony tenderness.      Comments:    Semi- Reducible extensor and flexor contractures at the MTPJ and PIPJ of toes 2-5, left foot.  Much improved right foot.  Much more rectus at this time to the right side.  Tenderness noted to the dorsal and distal aspects of the left 2nd 3rd 4th digits.    No pop      Feet:      Right foot:      Protective Sensation: 10 sites tested.  0 sites sensed.      Skin integrity: No ulcer, blister, skin breakdown, erythema, warmth, callus or dry skin.      Toenail Condition: Right toenails are abnormally thick.      Left foot:      Protective Sensation: 10 sites tested.  0 sites sensed.      Skin integrity: Skin breakdown present. No blister, erythema, warmth, callus or dry skin.      Toenail Condition: Left toenails are abnormally thick.      Comments:  No soi      hpk right, 2nd and 3rd toe subungal distal   Pre-ulcer          Skin:     General: Skin is warm and dry.      Capillary Refill: Capillary refill takes 2 to 3 seconds.      Coloration: Skin is not pale.      Findings: No erythema or rash.      Nails: There is clubbing.      Comments:          Neurological:      Mental Status: She is alert and oriented to person, place, and time.      Sensory: Sensory deficit present.      Motor: Atrophy present.      Gait: Gait abnormal.   Psychiatric:         Attention and Perception: Attention normal.         Mood and Affect: Mood normal.         Speech: Speech normal.         Thought Content: Thought content normal.         Cognition and Memory: Cognition normal.         Judgment:  Judgment normal.                  Assessment:       Encounter Diagnoses   Name Primary?    Hammer toes of both feet Yes    Left foot pain                            Plan:       Manisha was seen today for procedure.    Diagnoses and all orders for this visit:    Hammer toes of both feet  -     oxyCODONE-acetaminophen (PERCOCET) 5-325 mg per tablet; Take 1 tablet by mouth every 6 (six) hours as needed.    Left foot pain    Other orders  -     cephALEXin (KEFLEX) 250 MG capsule; Take 1 capsule (250 mg total) by mouth every 12 (twelve) hours.          I counseled the patient on her conditions, their implications and medical management.    Conservative and surgical options discussed in detail for hammertoe deformity.      Procedure: Flexor Tenotomy x3/left 2nd 3rd and 4th digit    Preop diagnosis:  Flexible hammertoe deformity  Performed by: Dr. Ho  Authorized by: Patient    Attention was directed to the left 2nd 3rd and 4th digit where 4cc of 1% lidocaine plain was injected in a digital block fashion, the foot was then prepped and draped using asceptic technique, anesthesia was confirmed, a #15 blade was inserted into the central/plantar aspect of the joint contracture and the flexor tendon was released, the digit was straightened manually and reduction was deemed adequate, sterile bandages were applied to splint the digit in a corrected position and closed with 4-0 nylon suture and dry sterile dressing applied to the foot. Capillary fill time was less than 3 seconds.  The area was splinted, the procedure was well tolerated with no complications.  Postoperative instructions were discussed in detail.  Follow-up in 1 week.

## 2025-06-16 NOTE — PROGRESS NOTES
Subjective:       Patient ID: Manisha Wynne is a 74 y.o. female.    Chief Complaint: Follow-up and Foot Pain    Follow-up  Pertinent negatives include no abdominal pain, chest pain (arm pain or jaw pain), headaches, nausea or vomiting.   Foot Pain  Pertinent negatives include no abdominal pain, chest pain (arm pain or jaw pain), headaches, nausea or vomiting.   Pt with multiple falls of late - had one in the last few days and did hit her head.  No CP or SOB.   Review of Systems   Respiratory:  Negative for shortness of breath (PND or orthopnea).    Cardiovascular:  Negative for chest pain (arm pain or jaw pain).   Gastrointestinal:  Negative for abdominal pain, diarrhea, nausea and vomiting.   Genitourinary:  Negative for dysuria.   Neurological:  Negative for seizures, syncope and headaches.       Objective:      Physical Exam  Constitutional:       General: She is not in acute distress.     Appearance: She is well-developed.   HENT:      Head: Normocephalic.   Eyes:      Pupils: Pupils are equal, round, and reactive to light.   Neck:      Thyroid: No thyromegaly.      Vascular: No JVD.   Cardiovascular:      Rate and Rhythm: Normal rate and regular rhythm.      Heart sounds: Normal heart sounds. No murmur heard.     No friction rub. No gallop.   Pulmonary:      Effort: Pulmonary effort is normal.      Breath sounds: Normal breath sounds. No wheezing or rales.   Abdominal:      General: Bowel sounds are normal. There is no distension.      Palpations: Abdomen is soft. There is no mass.      Tenderness: There is no abdominal tenderness. There is no guarding or rebound.   Musculoskeletal:      Cervical back: Neck supple.   Lymphadenopathy:      Cervical: No cervical adenopathy.   Skin:     General: Skin is warm and dry.   Neurological:      Mental Status: She is alert and oriented to person, place, and time.      Deep Tendon Reflexes: Reflexes are normal and symmetric.   Psychiatric:         Behavior:  Behavior normal.         Thought Content: Thought content normal.         Judgment: Judgment normal.         Assessment:       1. Hyperlipidemia, unspecified hyperlipidemia type    2. Anemia, unspecified type    3. Screening mammogram, encounter for    4. Screening for colon cancer    5. Deficiency of other specified B group vitamins    6. Head trauma, initial encounter        Plan:   Hyperlipidemia, unspecified hyperlipidemia type  -     Lipid Panel; Future; Expected date: 06/10/2025    Anemia, unspecified type  -     CBC Auto Differential; Future; Expected date: 06/10/2025  -     Comprehensive Metabolic Panel; Future; Expected date: 06/10/2025  -     Vitamin B12; Future; Expected date: 07/10/2025  -     Methylmalonic Acid, Serum; Future; Expected date: 06/10/2025    Screening mammogram, encounter for  -     Mammo Digital Screening Bilat w/ Dre (XPD); Future; Expected date: 12/10/2025    Screening for colon cancer  -     Ambulatory referral/consult to Endo Procedure ; Future; Expected date: 06/11/2025    Deficiency of other specified B group vitamins  -     Vitamin B12; Future; Expected date: 07/10/2025    Head trauma, initial encounter  -     CT Head Without Contrast; Future; Expected date: 06/10/2025

## 2025-06-18 ENCOUNTER — HOSPITAL ENCOUNTER (OUTPATIENT)
Dept: RADIOLOGY | Facility: HOSPITAL | Age: 75
Discharge: HOME | End: 2025-06-18
Attending: INTERNAL MEDICINE
Payer: MEDICARE

## 2025-06-18 ENCOUNTER — RESULTS FOLLOW-UP (OUTPATIENT)
Dept: INTERNAL MEDICINE | Facility: CLINIC | Age: 75
End: 2025-06-18

## 2025-06-18 DIAGNOSIS — S09.90XA HEAD TRAUMA, INITIAL ENCOUNTER: ICD-10-CM

## 2025-06-18 PROCEDURE — 70450 CT HEAD/BRAIN W/O DYE: CPT | Mod: 26,HCNC,, | Performed by: RADIOLOGY

## 2025-06-18 PROCEDURE — 70450 CT HEAD/BRAIN W/O DYE: CPT | Mod: TC,HCNC

## 2025-06-23 ENCOUNTER — OFFICE VISIT (OUTPATIENT)
Dept: PODIATRY | Facility: CLINIC | Age: 75
End: 2025-06-23
Payer: MEDICARE

## 2025-06-23 VITALS
HEIGHT: 68 IN | SYSTOLIC BLOOD PRESSURE: 131 MMHG | HEART RATE: 60 BPM | WEIGHT: 133.38 LBS | DIASTOLIC BLOOD PRESSURE: 75 MMHG | BODY MASS INDEX: 20.21 KG/M2

## 2025-06-23 DIAGNOSIS — M20.42 HAMMER TOES OF BOTH FEET: Primary | ICD-10-CM

## 2025-06-23 DIAGNOSIS — M20.41 HAMMER TOES OF BOTH FEET: Primary | ICD-10-CM

## 2025-06-23 PROCEDURE — 1160F RVW MEDS BY RX/DR IN RCRD: CPT | Mod: CPTII,HCNC,S$GLB, | Performed by: PODIATRIST

## 2025-06-23 PROCEDURE — 99999 PR PBB SHADOW E&M-EST. PATIENT-LVL IV: CPT | Mod: PBBFAC,HCNC,, | Performed by: PODIATRIST

## 2025-06-23 PROCEDURE — 99024 POSTOP FOLLOW-UP VISIT: CPT | Mod: HCNC,S$GLB,, | Performed by: PODIATRIST

## 2025-06-23 PROCEDURE — 1125F AMNT PAIN NOTED PAIN PRSNT: CPT | Mod: CPTII,HCNC,S$GLB, | Performed by: PODIATRIST

## 2025-06-23 PROCEDURE — 1159F MED LIST DOCD IN RCRD: CPT | Mod: CPTII,HCNC,S$GLB, | Performed by: PODIATRIST

## 2025-06-23 PROCEDURE — 3288F FALL RISK ASSESSMENT DOCD: CPT | Mod: CPTII,HCNC,S$GLB, | Performed by: PODIATRIST

## 2025-06-23 PROCEDURE — 1157F ADVNC CARE PLAN IN RCRD: CPT | Mod: CPTII,HCNC,S$GLB, | Performed by: PODIATRIST

## 2025-06-23 PROCEDURE — 3075F SYST BP GE 130 - 139MM HG: CPT | Mod: CPTII,HCNC,S$GLB, | Performed by: PODIATRIST

## 2025-06-23 PROCEDURE — 3078F DIAST BP <80 MM HG: CPT | Mod: CPTII,HCNC,S$GLB, | Performed by: PODIATRIST

## 2025-06-23 PROCEDURE — 1101F PT FALLS ASSESS-DOCD LE1/YR: CPT | Mod: CPTII,HCNC,S$GLB, | Performed by: PODIATRIST

## 2025-06-24 NOTE — PROGRESS NOTES
Patient that is 2 weeks status post flexor tenotomy x3.  Doing very well.  No signs of infection or dehiscence.  Digits appear to be in a much more anatomic position.  Sutures removed today.  Resume normal shoe gear and activity to tolerance.  Follow-up in 3 months.

## 2025-06-28 ENCOUNTER — LAB VISIT (OUTPATIENT)
Dept: LAB | Facility: HOSPITAL | Age: 75
End: 2025-06-28
Payer: MEDICARE

## 2025-06-28 DIAGNOSIS — D64.9 ANEMIA, UNSPECIFIED TYPE: ICD-10-CM

## 2025-06-28 DIAGNOSIS — E53.8 DEFICIENCY OF OTHER SPECIFIED B GROUP VITAMINS: ICD-10-CM

## 2025-06-28 DIAGNOSIS — E78.5 HYPERLIPIDEMIA, UNSPECIFIED HYPERLIPIDEMIA TYPE: ICD-10-CM

## 2025-06-28 LAB
ABSOLUTE EOSINOPHIL (OHS): 0.19 K/UL
ABSOLUTE MONOCYTE (OHS): 0.71 K/UL (ref 0.3–1)
ABSOLUTE NEUTROPHIL COUNT (OHS): 4.18 K/UL (ref 1.8–7.7)
ALBUMIN SERPL BCP-MCNC: 3.9 G/DL (ref 3.5–5.2)
ALP SERPL-CCNC: 47 UNIT/L (ref 40–150)
ALT SERPL W/O P-5'-P-CCNC: 22 UNIT/L (ref 10–44)
ANION GAP (OHS): 9 MMOL/L (ref 8–16)
AST SERPL-CCNC: 25 UNIT/L (ref 11–45)
BASOPHILS # BLD AUTO: 0.05 K/UL
BASOPHILS NFR BLD AUTO: 0.7 %
BILIRUB SERPL-MCNC: 0.6 MG/DL (ref 0.1–1)
BUN SERPL-MCNC: 20 MG/DL (ref 8–23)
CALCIUM SERPL-MCNC: 9.3 MG/DL (ref 8.7–10.5)
CHLORIDE SERPL-SCNC: 106 MMOL/L (ref 95–110)
CHOLEST SERPL-MCNC: 188 MG/DL (ref 120–199)
CHOLEST/HDLC SERPL: 2.7 {RATIO} (ref 2–5)
CO2 SERPL-SCNC: 23 MMOL/L (ref 23–29)
CREAT SERPL-MCNC: 1 MG/DL (ref 0.5–1.4)
ERYTHROCYTE [DISTWIDTH] IN BLOOD BY AUTOMATED COUNT: 13.4 % (ref 11.5–14.5)
GFR SERPLBLD CREATININE-BSD FMLA CKD-EPI: 59 ML/MIN/1.73/M2
GLUCOSE SERPL-MCNC: 106 MG/DL (ref 70–110)
HCT VFR BLD AUTO: 35.6 % (ref 37–48.5)
HDLC SERPL-MCNC: 69 MG/DL (ref 40–75)
HDLC SERPL: 36.7 % (ref 20–50)
HGB BLD-MCNC: 11.5 GM/DL (ref 12–16)
IMM GRANULOCYTES # BLD AUTO: 0.02 K/UL (ref 0–0.04)
IMM GRANULOCYTES NFR BLD AUTO: 0.3 % (ref 0–0.5)
LDLC SERPL CALC-MCNC: 101.4 MG/DL (ref 63–159)
LYMPHOCYTES # BLD AUTO: 2.51 K/UL (ref 1–4.8)
MCH RBC QN AUTO: 29.9 PG (ref 27–31)
MCHC RBC AUTO-ENTMCNC: 32.3 G/DL (ref 32–36)
MCV RBC AUTO: 93 FL (ref 82–98)
NONHDLC SERPL-MCNC: 119 MG/DL
NUCLEATED RBC (/100WBC) (OHS): 0 /100 WBC
PLATELET # BLD AUTO: 173 K/UL (ref 150–450)
PMV BLD AUTO: 11.7 FL (ref 9.2–12.9)
POTASSIUM SERPL-SCNC: 4.5 MMOL/L (ref 3.5–5.1)
PROT SERPL-MCNC: 7 GM/DL (ref 6–8.4)
RBC # BLD AUTO: 3.84 M/UL (ref 4–5.4)
RELATIVE EOSINOPHIL (OHS): 2.5 %
RELATIVE LYMPHOCYTE (OHS): 32.8 % (ref 18–48)
RELATIVE MONOCYTE (OHS): 9.3 % (ref 4–15)
RELATIVE NEUTROPHIL (OHS): 54.4 % (ref 38–73)
SODIUM SERPL-SCNC: 138 MMOL/L (ref 136–145)
TRIGL SERPL-MCNC: 88 MG/DL (ref 30–150)
VIT B12 SERPL-MCNC: >2000 PG/ML (ref 210–950)
WBC # BLD AUTO: 7.66 K/UL (ref 3.9–12.7)

## 2025-06-28 PROCEDURE — 85025 COMPLETE CBC W/AUTO DIFF WBC: CPT | Mod: HCNC

## 2025-06-28 PROCEDURE — 36415 COLL VENOUS BLD VENIPUNCTURE: CPT | Mod: HCNC

## 2025-06-28 PROCEDURE — 83921 ORGANIC ACID SINGLE QUANT: CPT | Mod: HCNC

## 2025-06-28 PROCEDURE — 82607 VITAMIN B-12: CPT | Mod: HCNC

## 2025-06-28 PROCEDURE — 80053 COMPREHEN METABOLIC PANEL: CPT | Mod: HCNC

## 2025-06-28 PROCEDURE — 80061 LIPID PANEL: CPT | Mod: HCNC

## 2025-07-01 ENCOUNTER — CLINICAL SUPPORT (OUTPATIENT)
Dept: CARDIOLOGY | Facility: HOSPITAL | Age: 75
End: 2025-07-01
Attending: INTERNAL MEDICINE
Payer: MEDICARE

## 2025-07-01 ENCOUNTER — CLINICAL SUPPORT (OUTPATIENT)
Dept: CARDIOLOGY | Facility: HOSPITAL | Age: 75
End: 2025-07-01
Payer: MEDICARE

## 2025-07-01 DIAGNOSIS — R00.1 BRADYCARDIA, UNSPECIFIED: ICD-10-CM

## 2025-07-01 DIAGNOSIS — Z95.0 PRESENCE OF CARDIAC PACEMAKER: ICD-10-CM

## 2025-07-01 PROCEDURE — 93296 REM INTERROG EVL PM/IDS: CPT | Mod: HCNC | Performed by: INTERNAL MEDICINE

## 2025-07-01 PROCEDURE — 93294 REM INTERROG EVL PM/LDLS PM: CPT | Mod: HCNC,,, | Performed by: INTERNAL MEDICINE

## 2025-07-02 ENCOUNTER — OFFICE VISIT (OUTPATIENT)
Dept: CARDIOLOGY | Facility: CLINIC | Age: 75
End: 2025-07-02
Payer: MEDICARE

## 2025-07-02 VITALS
BODY MASS INDEX: 20.32 KG/M2 | HEART RATE: 65 BPM | WEIGHT: 134.06 LBS | OXYGEN SATURATION: 100 % | HEIGHT: 68 IN | SYSTOLIC BLOOD PRESSURE: 118 MMHG | DIASTOLIC BLOOD PRESSURE: 73 MMHG

## 2025-07-02 DIAGNOSIS — N18.31 STAGE 3A CHRONIC KIDNEY DISEASE: Chronic | ICD-10-CM

## 2025-07-02 DIAGNOSIS — Z95.0 CARDIAC PACEMAKER IN SITU: Chronic | ICD-10-CM

## 2025-07-02 DIAGNOSIS — I10 ESSENTIAL HYPERTENSION: Primary | ICD-10-CM

## 2025-07-02 DIAGNOSIS — E78.5 DYSLIPIDEMIA: ICD-10-CM

## 2025-07-02 DIAGNOSIS — I49.5 SSS (SICK SINUS SYNDROME): Chronic | ICD-10-CM

## 2025-07-02 PROCEDURE — 3074F SYST BP LT 130 MM HG: CPT | Mod: CPTII,HCNC,S$GLB, | Performed by: INTERNAL MEDICINE

## 2025-07-02 PROCEDURE — 1100F PTFALLS ASSESS-DOCD GE2>/YR: CPT | Mod: CPTII,HCNC,S$GLB, | Performed by: INTERNAL MEDICINE

## 2025-07-02 PROCEDURE — 1126F AMNT PAIN NOTED NONE PRSNT: CPT | Mod: CPTII,HCNC,S$GLB, | Performed by: INTERNAL MEDICINE

## 2025-07-02 PROCEDURE — 1159F MED LIST DOCD IN RCRD: CPT | Mod: CPTII,HCNC,S$GLB, | Performed by: INTERNAL MEDICINE

## 2025-07-02 PROCEDURE — 99999 PR PBB SHADOW E&M-EST. PATIENT-LVL IV: CPT | Mod: PBBFAC,HCNC,, | Performed by: INTERNAL MEDICINE

## 2025-07-02 PROCEDURE — 1157F ADVNC CARE PLAN IN RCRD: CPT | Mod: CPTII,HCNC,S$GLB, | Performed by: INTERNAL MEDICINE

## 2025-07-02 PROCEDURE — 99214 OFFICE O/P EST MOD 30 MIN: CPT | Mod: HCNC,S$GLB,, | Performed by: INTERNAL MEDICINE

## 2025-07-02 PROCEDURE — 3078F DIAST BP <80 MM HG: CPT | Mod: CPTII,HCNC,S$GLB, | Performed by: INTERNAL MEDICINE

## 2025-07-02 PROCEDURE — 3288F FALL RISK ASSESSMENT DOCD: CPT | Mod: CPTII,HCNC,S$GLB, | Performed by: INTERNAL MEDICINE

## 2025-07-02 RX ORDER — PRAVASTATIN SODIUM 40 MG/1
40 TABLET ORAL NIGHTLY
Qty: 90 TABLET | Refills: 3 | Status: SHIPPED | OUTPATIENT
Start: 2025-07-02

## 2025-07-02 RX ORDER — LOSARTAN POTASSIUM 25 MG/1
25 TABLET ORAL DAILY
Qty: 90 TABLET | Refills: 3 | Status: SHIPPED | OUTPATIENT
Start: 2025-07-02

## 2025-07-02 NOTE — PROGRESS NOTES
"Subjective:   Chief Complaint: Establish Care  Last Clinic Visit: New Patient    History of Present Illness: Manisha Wynne is a 74 y.o. lady with sick sinus syndrome s/p DC ppm, hypertension, incidental low burden AFib not on anticoagulation, cervical myelopathy, hyperlipidemia, tremor, who presents to establish cardiology care she most recently followed with Dr. Comer and would like to transition to a new provider.  She reports recurrent numerous falls over the course the past year, denies any loss of consciousness all falls appear to be mechanical does have some intermittent lightheadedness, but no clear regular presyncopal symptoms.  Denies any focal neurological deficits, blood pressure low normal on current therapy.  Primary prevention most recent  on low intensity statin.  Does have a tremor for which she takes propranolol ask about whether or not this doses indicated whether a higher or lower dose would be indicated.  Dr. Comer's last note recommended titration up of losartan, but currently blood pressure at goal and she reports only taking 25 mg once daily of losartan.    Dx:  Sick sinus syndrome s/p DC PPM  Hypertension   Paroxysmal atrial fibrillation incidentally detected very low burden by device, not on anticoagulation  Cervical myelopathy, recurrent falls  Hyperlipidemia   Tremor    Medications:  Encounter Medications[1]  Social History:  Manisha reports that she quit smoking about 42 years ago. Her smoking use included cigarettes. She started smoking about 57 years ago. She has a 3.8 pack-year smoking history. She has quit using smokeless tobacco. She reports current alcohol use. She reports that she does not use drugs.    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    Objective:   /73   Pulse 65   Ht 5' 8" (1.727 m)   Wt 60.8 kg (134 lb 0.6 oz)   LMP 10/01/2003   SpO2 100%   BMI 20.38 kg/m²     Physical Exam   HENT:   Head: Normocephalic and atraumatic. " Mouth/Throat: Mucous membranes are moist.   Cardiovascular: Normal rate, regular rhythm and normal pulses. Exam reveals no gallop and no friction rub.   No murmur heard.  Pulmonary/Chest: Effort normal and breath sounds normal. No stridor. No respiratory distress. She has no rhonchi. She has no rales. She exhibits no tenderness.   Abdominal: Normal appearance. She exhibits no distension.   Musculoskeletal:      Right lower leg: No edema.      Left lower leg: No edema.   Neurological: She is alert.   Skin: Skin is warm. Capillary refill takes less than 2 seconds.      EKG:  My independent visualization of most recent EKG is atrial paced rhythm right bundle-branch block    TTE:  04/19/2024     Left Ventricle: The left ventricle is normal in size. Normal wall thickness. There is normal systolic function with a visually estimated ejection fraction of 60 - 65%. Ejection fraction by visual approximation is 65%. There is normal diastolic function.    Right Ventricle: Normal right ventricular cavity size. Wall thickness is normal. Right ventricle wall motion  is normal. Systolic function is normal. Pacemaker lead present in the ventricle.    Left Atrium: Left atrium is mildly dilated.    Right Atrium: Right atrium is mildly dilated. Lead present in the right atrium.    Aortic Valve: There is moderate aortic valve sclerosis. There is mild annular calcification present. There is normal leaflet mobility. There may be at most trivial  stenosis. Aortic valve area by VTI is 2.08 cm². Aortic valve peak velocity is 2.02 m/s. Mean gradient is 8 mmHg. The dimensionless index is 0.55. There is mild aortic regurgitation.    Pulmonic Valve: There is mild regurgitation.    Pulmonary Artery: The estimated pulmonary artery systolic pressure is 30 mmHg.    IVC/SVC: Normal venous pressure at 3 mmHg.     Lipids:  Recent Labs   Lab 06/28/25  0944   LDL Cholesterol 101.4   HDL Cholesterol 69      Renal:  Recent Labs   Lab 06/28/25  0944    Creatinine 1.0   Potassium 4.5   CO2 23   BUN 20     Liver:  Recent Labs   Lab 06/28/25  0944   AST 25   ALT 22     Assessment:     1. Essential hypertension    2. Dyslipidemia    3. SSS (sick sinus syndrome)    4. Cardiac pacemaker in situ    5. Stage 3a chronic kidney disease      Plan:   1. Essential hypertension (Primary)  No clear orthostasis, normal blood pressure in clinic, on current therapy for some time, she also denies any presyncope around the time of her falls this with her cervical myelopathy suspect that falls are mechanical in nature, continue current antihypertensive therapy.  - losartan (COZAAR) 25 MG tablet; Take 1 tablet (25 mg total) by mouth once daily.  Dispense: 90 tablet; Refill: 3    2. Dyslipidemia  Primary prevention, most recent LDL up slightly, but will continue current therapy no changes continue statin  - pravastatin (PRAVACHOL) 40 MG tablet; Take 1 tablet (40 mg total) by mouth every evening.  Dispense: 90 tablet; Refill: 3    3. SSS (sick sinus syndrome)  Device functioning well, no significant RV pacing, does have mild right atrial pacing burden 30%    4. Cardiac pacemaker in situ      5. Stage 3a chronic kidney disease  Stable continue to monitor    Follow up in one year      Omi Reane MD [facility]           [1]   Outpatient Encounter Medications as of 7/2/2025   Medication Sig Dispense Refill    acetaminophen (TYLENOL) 500 MG tablet Take 2 tablets (1,000 mg total) by mouth 2 (two) times daily as needed for Pain. Begin post op day 3. 50 tablet 0    aspirin (ECOTRIN) 81 MG EC tablet Take 1 tablet (81 mg total) by mouth once daily. 90 tablet 3    buPROPion (WELLBUTRIN XL) 300 MG 24 hr tablet Take 1 tablet by mouth once daily.      cephALEXin (KEFLEX) 250 MG capsule Take 1 capsule (250 mg total) by mouth every 12 (twelve) hours. 14 capsule 0    estradioL (ESTRACE) 0.01 % (0.1 mg/gram) vaginal cream Use one gram nightly x 2 weeks then twice weekly 42.5 g 3    FLUoxetine 40 MG  capsule Take 40 mg by mouth.      lisdexamfetamine (VYVANSE) 70 MG capsule Take 70 mg by mouth every morning.      measles, mumps and rubella vaccine (MMR) 1,000-12,500 TCID50/0.5 mL injection Inject into the skin. 1 each 0    melatonin (MELATIN) 5 mg Take 1 tablet by mouth every evening. (For insomnia)      methocarbamoL (ROBAXIN) 500 MG Tab One - two daily as needed for back pain 40 tablet 0    ondansetron (ZOFRAN-ODT) 4 MG TbDL Take 1 tablet (4 mg total) by mouth every 8 (eight) hours as needed (every 8-12 hrs prn nausea). 15 tablet 0    oxyCODONE-acetaminophen (PERCOCET) 5-325 mg per tablet Take 1 tablet by mouth every 6 (six) hours as needed. 30 tablet 0    PROLIA 60 mg/mL Syrg Inject 60 mg into the skin every 6 (six) months.      propranoloL (INDERAL) 20 MG tablet TAKE 1 TABLET TWICE DAILY 180 tablet 1    vibegron (GEMTESA) 75 mg Tab Take 1 tablet (75 mg total) by mouth Daily. 30 tablet 11    [DISCONTINUED] losartan (COZAAR) 25 MG tablet TAKE 1 TABLET ONE TIME DAILY 90 tablet 3    [DISCONTINUED] pravastatin (PRAVACHOL) 40 MG tablet TAKE 1 TABLET ONE TIME DAILY 90 tablet 1    losartan (COZAAR) 25 MG tablet Take 1 tablet (25 mg total) by mouth once daily. 90 tablet 3    pravastatin (PRAVACHOL) 40 MG tablet Take 1 tablet (40 mg total) by mouth every evening. 90 tablet 3     Facility-Administered Encounter Medications as of 7/2/2025   Medication Dose Route Frequency Provider Last Rate Last Admin    measles, mumps and rubella vaccine 1,000-12,500 TCID50/0.5 mL injection 0.5 mL  0.5 mL Subcutaneous 1 time in Clinic/HOD

## 2025-07-11 NOTE — PROGRESS NOTES
[facility]  Clinic Note    Date of Service: 7/11/2025  Patient seen at the request of: No ref. provider found    Reason for Consultation  Neuropathy  Chronic infarcts    Assessment:  Manisha Wynne is a 74 y.o. female who presents with neuropathy.    Presents with a chronic history of imbalance without vertigo associated with many falls.  Patient is status post C3-C6 fusion in 2022.  Patient notes continued falls.  Of note, the patient can not get an MRI due to her pacemaker.    On exam, the patient has an unstable gait with internal rotation of her bilateral knees, associated with bilateral hip flexor weakness, right knee flexion weakness, high arches, and hammertoes on bilateral feet.  Distal lower extremity strength is preserved.  Reflexes are elevated at the knees and in the bilateral upper extremities.  Babinski negative bilaterally.    Differential diagnosis includes genetic neuropathies including CMT, however, patient has preserved distal lower extremity strength on exam.  She does report footdrop when she is fatigued.  Family history is unknown due to patient being adopted, but heritage is [place].  The patient's elevated reflexes also suggest upper motor neuron involvement.  Upper motor signs may be secondary to remote infarcts on CTH.  Patient is not MRI compatible.      EMG/NCS 7/2021:  Bilateral chronic L5 radiculopathy.  Bilaterally absent minimum H-reflex latencies are suggestive of underlying bilateral S1 chronic radiculopathy versus underlying early sensory motor axonal polyneuropathy.   EMG/NCS 9/2024:  This is a normal electrophysiologic study of the right upper extremity.   EMG/NCS 4/2025:  Bilateral lower extremities performed today revealing evidence of an axonal sensorimotor polyneuropathy of unclear etiology.  Also noted was evidence of chronic left L5 and bilateral S1 radiculopathies.    CT head 4/2025:  Mild chronic microvascular ischemic change and small remote lacunar  "infarcts of the right lateral basal ganglia and right external capsule.   CTA 4/2025:  Mild-to-moderate intracranial atherosclerosis most notably about the proximal left M1 segment without evidence for significant stenosis or occlusion. No intracranial aneurysm.       Plan:    - refer to physical therapy for frequent falls and bilateral axonal sensorimotor polyneuropathy  - recommend continued exercises for lower extremity strength at home   - agree with enrolling back in [employer] balance program as discussed in clinic    - referral to genetic counselor    - continue aspirin 81mg daily  - continue pravastatin 40mg daily      Transfer to more permanent neurologist (Dr. Joyner)      A dictation device was used to produce this document. Use of such devices sometimes results in grammatical errors or replacement of words that sound similarly.     Signed:    Donald Daly MD  Neurology/Epilepsy  07/11/2025 1:02 PM      Interval Events:  - patient continues to have more falls        HPI:  Manisha Wynne is a 74 y.o. female with   Past Medical History:   Diagnosis Date    Abnormal Pap smear     Atrial fibrillation     AV block, 1st degree 07/25/2012    C. difficile diarrhea     RESOLVED    Cataract     Depression 07/24/2012    Facet arthritis of lumbar region 03/31/2015    Falls     3 falls in the last 6 mos--noted 6/19/19    Hyperlipidemia     Hypertension 07/24/2012    Neuropathy     Other specified cardiac dysrhythmias(427.89)     Sleep apnea     Syncope 07/24/2012    Tremors of nervous system     hands bilaterally     Hx of C3-C6 posterior fusion 9/2022.    Patient has a chronic history of falls.  She got a pacemaker in 2012 after a workup for syncope.      Last big fall was in February of 2024, she was seen in the ER and then subsequently fell again and fractured her elbow, needing surgery.  Patient tends to fall forward.  She denies vertigo.  Patient denies a "numbing feeling", but she wakes up sometimes " "feeling like she has her shoes on.      Patient reports falling twice while going down a ramp.  Patient walks with a cane when outside.  Can ambulate without at home.      Patient reports dragging her feet when she gets tired.      Patient has a history of hammer toes.  She has had podiatric surgery.      Family history unknown due to adoption.  Heritage is [place] from birth certificate.      This is the extent of the patient's complaints at this time.     From last neurosurgery note 9/12/2024 (Dr. Cheek):  Manisha Wynne is a 73 y.o. female (former  at [employer] [facility] [employer]) who presents as a referral from Dr. Mcduffie for evaluation of cervical myelopathy. The patient reports that she has had several falls from [id number], which prompted her to seek neurologic evaluation. The falls have resulted in injuries, including breaking her wrist. She also has numbness, tingling, and burning in her feet. She denies pain in her neck, but endorses in her mid- and low back. Her pain is 10/10 in the legs, 10/10 in the back, and 0/10 in the neck and arms. The pain is intermittent. It is made worse by sitting. It improves with standing. She denies any change in bowel/bladder habit.      She endorses handwriting changes, dropping objects, and balance/gait difficulty. She denies fine motor changes. She has not seen pain management for her neck, but she is seeing Dr. Booker for "sciatica."      CT of the cervical spine (the patient has a non-MRI-conditional compatible pacemaker in place) was obtained and personally reviewed, demonstrating significant stenosis at C5-C6>C4-C5. Flex ex shows some mild instability of C3 on C4.      She lives alone with her [employer] cats. Her family is in [place] and [place]. She does have friends nearby who will help to care for her in perioperative periods. She takes ASA 81 daily.      As of 5/6/21, the patient had the myelogram as requested. This is personally reviewed and demonstrates significant cord " compression and OPLL. There is some stenosis at L4-L5 as well.      The patient reports that she has been well. She has had no falls. She has been doing PT for her sciatica, which she has found helpful. She does note that her tremor, which may be progressing slightly, is now interfering with her applying makeup at times.      As of 5/25/21, the patient reports that she has not had any significant changes. She presents today with a list of questions, together with friend Kavya Roberts on the phone. She also endorses a several year history of urinary incontinence (episodes occur overnight or with coughing) for which she was going to participate in pelvic floor rehab and see urogyn; however, this was interrupted by Covid.      As of 1/11/22, the patient reports that she has had ongoing C. Diff since we scheduled surgery. She was seen about a potential colonoscopy today, but was advised against proceeding. She is due for a CT of the abdomen/pelvis next week to look at the pancreas. She is also being considered for a fecal transplant. There is also a clinical trial going on in which she's interested.      Dr. Maradiaga of ID has been quarterbacking the C. Diff response. The patient is frustrated by her lack of improvement.      She is less fatigued now than when she initially became sick, but she does not feel she has yet recovered to her baseline. She is also being recommended for cataract surgery and pacemaker generator replacement in about 13 months.      As of 8/4/22, the patient reports that she is finally over her C. Diff. 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 She notes that she has been really careful. She walks very slowly. She has noticed some tremor in her hands at rest. This gets better when she holds onto an object. She denies anosmia. She denies significant sleep difficulties and other non-motor symptoms of PD.      As of 12/22/22, the patient underwent C3-C6 posterior cervical  decompression and fusion on 11/14/22. She reports that she has been doing well since surgery. She has NOT been using a bone growth stimulator. She denies fever, chills, or drainage from the incision. She has no complaints of C. Diff. She is eager to return to work in the 's office.      As of 2/2/23, the patient returns in scheduled follow-up. She reports she has been well. She is walking 6000 steps several times a week. She has returned to work, though she has been a bit more forgetful about dates and times. We discussed Dr. Cordero.      She saw Sunitha this morning, who is starting outpatient PT.      Bone growth stimulator was received, and she has been using it.      She reports that she had a mechanical trip and fall in which her head didn't hit the ground last week.      As of 5/4/23, the patient returns in scheduled postoperative follow up. She reports that she has one additional month of PT.      She reports that she has had 3 falls since we saw each other last, but all of them have been mechanical. One she slipped on water, one she was getting up from the floor, once when walking on uneven ground (sidewalk was dug up). She notes that she moved about a month ago.       She occasionally has headaches. She has occasional neck pain in the left upper portion, but this has been improving. The cats have been well. She is back at work 2 days a week.      As of 2/22/24, the patient returns in scheduled follow-up. Overall, she has been well, though she is still falling (most recently in December). She sustained a head laceration after a fall at [facility] in November that now appears well healed.      She requests a referral to neurology for her ongoing neuropathy.      She is also complaining of low back pain with sciatica. She denies any new weakness associated with this and is interested in doing [facility].      Propanolol has been helping her tremor.      As of 9/12/24, the patient returns after having  completed myelogram of the entire spine. This demonstrates that the right C3 screw is too medial (as previously appreciated) but that there is no mass effect on the thecal sac nor spinal cord.      She is still hoping to re-establish with a neurologist for her peripheral neuropathy. 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 She is still working.       TSH   Date Value Ref Range Status   02/10/2025 2.522 0.400 - 4.000 uIU/mL Final   11/08/2024 2.950 0.400 - 4.000 uIU/mL Final     Vitamin B12   Date Value Ref Range Status   06/28/2025 >2,000 (H) 210 - 950 pg/mL Final     Vitamin B-12   Date Value Ref Range Status   11/16/2023 >2000 (H) 210 - 950 pg/mL Final   03/13/2023 616 210 - 950 pg/mL Final     Hemoglobin A1C   Date Value Ref Range Status   11/16/2023 5.5 4.0 - 5.6 % Final     Comment:     ADA Screening Guidelines:  5.7-6.4%  Consistent with prediabetes  >or=6.5%  Consistent with diabetes    High levels of fetal hemoglobin interfere with the HbA1C  assay. Heterozygous hemoglobin variants (HbS, HgC, etc)do  not significantly interfere with this assay.   However, presence of multiple variants may affect accuracy.     08/05/2022 5.3 4.0 - 5.6 % Final     Comment:     ADA Screening Guidelines:  5.7-6.4%  Consistent with prediabetes  >or=6.5%  Consistent with diabetes    High levels of fetal hemoglobin interfere with the HbA1C  assay. Heterozygous hemoglobin variants (HbS, HgC, etc)do  not significantly interfere with this assay.   However, presence of multiple variants may affect accuracy.           Review of Systems:  ROS negative unless noted in HPI    Past Surgical History:  Past Surgical History:   Procedure Laterality Date    APPLICATION OF CARTILAGE GRAFT Bilateral 12/19/2019    Procedure: APPLICATION, CARTILAGE GRAFT AURICULAR JEZ;  Surgeon: Martinez Flores III, MD;  Location: [facility];  Service: ENT;  Laterality: Bilateral;    CARDIAC PACEMAKER PLACEMENT   09/07/2012    [id number]    CATARACT EXTRACTION W/  INTRAOCULAR LENS IMPLANT Right 5/16/2022    Procedure: EXTRACTION, CATARACT, WITH IOL INSERTION;  Surgeon: Ines Aguilera MD;  Location: [facility];  Service: Ophthalmology;  Laterality: Right;  Catalys     CATARACT EXTRACTION W/  INTRAOCULAR LENS IMPLANT Left 6/20/2022    Procedure: EXTRACTION, CATARACT, WITH IOL INSERTION;  Surgeon: Ines Aguilera MD;  Location: [facility] OR;  Service: Ophthalmology;  Laterality: Left;  Catalys     DILATION AND CURETTAGE OF UTERUS      Hx of precancerous cells?    ENDOSCOPIC ULTRASOUND OF UPPER GASTROINTESTINAL TRACT N/A 7/5/2019    Procedure: ULTRASOUND, UPPER GI TRACT, ENDOSCOPIC;  Surgeon: Kev Calderon MD;  Location: [facility] ENDO (2ND FLR);  Service: Endoscopy;  Laterality: N/A;  Pacemaker-Adam   appt confirmed-rb    MYELOGRAPHY N/A 5/4/2021    Procedure: Myelogram  CERVICAL, THORACIC AND LUMBAR;  Surgeon: [facility] Provider;  Location: [facility] 2ND FLR;  Service: Radiology;  Laterality: N/A;    NASAL SEPTOPLASTY N/A 12/19/2019    Procedure: SEPTOPLASTY, NOSE;  Surgeon: Martinez Flores III, MD;  Location: [facility];  Service: ENT;  Laterality: N/A;  FOLLOW DR SALVATORE KIMBROUGH PROTOCOL    OPEN REDUCTION AND INTERNAL FIXATION (ORIF) OF FRACTURE OF DISTAL RADIUS Left 1/24/2020    Procedure: ORIF, FRACTURE, RADIUS, DISTAL-left;  Surgeon: Ellen Moe MD;  Location: [facility];  Service: Orthopedics;  Laterality: Left;    OPEN REDUCTION AND INTERNAL FIXATION (ORIF) OF INJURY OF ELBOW Right 2/28/2024    Procedure: ORIF, ELBOW;  Surgeon: Ellen Moe MD;  Location: [facility] OR;  Service: Orthopedics;  Laterality: Right;    POSTERIOR FUSION OF CERVICAL SPINE WITH LAMINECTOMY N/A 11/14/2022    Procedure: LAMINECTOMY, SPINE, CERVICAL, WITH POSTERIOR FUSION C3-C6;  Surgeon: Nicolette Cheek MD;  Location: [facility] 2ND FLR;  Service: Neurosurgery;  Laterality: N/A;  TORONTO III, ASA III, BLOOD TYPE AND SCREEN,  NEUROMONITORING EMG SEP MEP, BRACE/HALO [place], BED REGULAR BED, HEADREST [place], POSITION PRONE, SPECIAL EQUIPMENT NIKI MIDDLETON, RADIOLOGY C-ARM, EXT. BONE STIMULATOR BIOMET    REPLACEMENT OF PACEMAKER GENERATOR Left 10/7/2022    Procedure: REPLACEMENT, PACEMAKER GENERATOR;  Surgeon: John Sheets MD;  Location: [facility] EP LAB;  Service: Cardiology;  Laterality: Left;  YAS, SSS, AVB, Dual PPM Gen Change,SJM, MAC ,TN, 3 prep,** Adam dcPPM in situ**    SURGICAL REMOVAL OF NASAL TURBINATE Bilateral 2019    Procedure: EXCISION, NASAL TURBINATE;  Surgeon: Martinez Flores III, MD;  Location: [facility] OR;  Service: ENT;  Laterality: Bilateral;    TONSILLECTOMY      WISDOM TOOTH EXTRACTION         Family History:  Family History   Adopted: Yes   Problem Relation Name Age of Onset    Amblyopia Neg Hx      Blindness Neg Hx      Cataracts Neg Hx      Glaucoma Neg Hx      Macular degeneration Neg Hx      Retinal detachment Neg Hx         Social History:  Social History     Tobacco Use    Smoking status: Former     Current packs/day: 0.00     Average packs/day: 0.3 packs/day for 15.0 years (3.8 ttl pk-yrs)     Types: Cigarettes     Start date: 1967     Quit date: 1982     Years since quittin.9    Smokeless tobacco: Former   Substance Use Topics    Alcohol use: Yes     Comment: no daily or heavy use, ~4 times per month    Drug use: No       Allergies:  Patient has no known allergies.    Outpatient Medications:  Prior to Admission medications    Medication Sig Start Date End Date Taking? Authorizing Provider   acetaminophen (TYLENOL) 500 MG tablet Take 2 tablets (1,000 mg total) by mouth 2 (two) times daily as needed for Pain. Begin post op day 3. 24   Sarmad Varghese PA-C   aspirin (ECOTRIN) 81 MG EC tablet Take 1 tablet (81 mg total) by mouth 2 (two) times a day. 24  Sarmad Varghese PA-C   buPROPion (WELLBUTRIN XL) 300 MG 24 hr tablet Take 1 tablet by mouth once daily. 12/15/22    Provider, Historical   estradioL (ESTRACE) 0.01 % (0.1 mg/gram) vaginal cream Use one gram nightly x 2 weeks then twice weekly 8/22/24   Jessica Messer NP   FLUoxetine 40 MG capsule Take 40 mg by mouth. 10/11/24   Provider, Historical   fluticasone propionate (FLONASE) 50 mcg/actuation nasal spray 1 spray (50 mcg total) by Each Nostril route once daily. 10/9/24   Louise Canela NP   lisdexamfetamine (VYVANSE) 70 MG capsule Take 70 mg by mouth every morning.    Provider, Historical   losartan (COZAAR) 25 MG tablet TAKE 1 TABLET ONE TIME DAILY 6/3/24   Natali Comer MD   melatonin (MELATIN) 5 mg Take 1 tablet by mouth every evening. (For insomnia)    Provider, Historical   methocarbamoL (ROBAXIN) 500 MG Tab One - two daily as needed for back pain 5/3/24   Iris Blue MD   methyl salicylate-menthol 15-10% 15-10 % Crea Apply topically every evening. 12/13/22   Angely Dillon, NP   ondansetron (ZOFRAN-ODT) 4 MG TbDL Take 1 tablet (4 mg total) by mouth every 8 (eight) hours as needed (every 8-12 hrs prn nausea). 11/22/24   Ellen Cronin MD   pravastatin (PRAVACHOL) 40 MG tablet TAKE 1 TABLET ONE TIME DAILY 11/21/24   Iris Blue MD   PROLIA 60 mg/mL Syrg Inject 60 mg into the skin every 6 (six) months. 2/23/23   Provider, Historical   propranoloL (INDERAL) 20 MG tablet TAKE 1 TABLET TWICE DAILY 4/25/24   Natali Comer MD   vibegron (GEMTESA) 75 mg Tab Take 1 tablet (75 mg total) by mouth Daily. 10/24/24   Jessica Messer NP       Physical exam:    Vitals: LMP 10/01/2003     General:   Sitting in chair, in no distress, well-nourished, well-developed, appears stated age.  Head/Neck:   Normocephalic,atraumatic  Pulm:  Non-labored breathing     Mental Status: Alert and oriented to person, time, place, situation. Speech spontaneous and fluent without paraphasias; intermittent dysarthria  CN:  II: visual fields full  III, IV, VI: EOM intact without nystagmus or  diplopia.   V: Sensation to light touch full and symmetric in V1-3. [place] contraction full bilaterally.   VII: Facial movement full and symmetric.   VIII: Hearing grossly normal to conversation.  IX, X: Palate midline with symmetric elevation.    XI: SCM and trapezius: 5/5 bilaterally.   XII: Tongue midline without fasciculations.  Motor: Normal bulk and tone throughout all four extremities.   LUE: D: 5/5; B: 5/5; T:  5/5; WF: 5/5; WE:  5/5; IO: 5/5   RUE: D: 5/5; B: 5/5; T:  5/5; WF:5/5; WE:  5/5; IO: 5/5   LLE: HF: 4/5, KE: 5/5, KF: 5/5, DF: 5/5, PF: 5/5  RLE: HF: 4/5, KE: 5/5, KF: 4+/5, DF: 5/5, PF: 5/5  Unable to stand seated position without help from her upper extremities  No tremors   Sensory: Decreased vibratory sensation to the BLE.  Mostly in the R big toe and right patella  Reflexes: LUE: Biceps 3+ brachioradialis 3+  RUE: Biceps 3+, brachioradialis 3+  LLE: Knee 3+ suprapatellar, Babinski equivocal  RLE: Knee 3+ suprapatellar, Babinski downgoing  Feet are arched prominently bilaterally  Hammer toes present  Coordination:  Intact and symmetric to finger-to-nose and heel-to-shin.  Gait: Wobbly gait with knees internally rotated.  Unable to tandem gait.  Romberg negative.      Imaging:  All pertinent imaging was personally reviewed.      I spent a total of 35 minutes on the day of the visit. This includes face to face time and non-face to face time preparing to see the patient (eg, review of tests), obtaining and/or reviewing separately obtained history, documenting clinical information in the electronic or other health record, independently interpreting results and communicating results to the patient/family/caregiver, or care coordinator.

## 2025-07-14 ENCOUNTER — OFFICE VISIT (OUTPATIENT)
Dept: NEUROLOGY | Facility: CLINIC | Age: 75
End: 2025-07-14
Payer: MEDICARE

## 2025-07-14 VITALS
BODY MASS INDEX: 21.12 KG/M2 | HEART RATE: 73 BPM | SYSTOLIC BLOOD PRESSURE: 133 MMHG | WEIGHT: 138.88 LBS | DIASTOLIC BLOOD PRESSURE: 75 MMHG

## 2025-07-14 DIAGNOSIS — G57.93 UNSPECIFIED MONONEUROPATHY OF BILATERAL LOWER LIMBS: Primary | ICD-10-CM

## 2025-07-14 DIAGNOSIS — R29.6 FREQUENT FALLS: ICD-10-CM

## 2025-07-14 DIAGNOSIS — G62.9 NEUROPATHY: ICD-10-CM

## 2025-07-14 DIAGNOSIS — M54.16 LUMBAR RADICULOPATHY, CHRONIC: ICD-10-CM

## 2025-07-14 PROCEDURE — 3288F FALL RISK ASSESSMENT DOCD: CPT | Mod: CPTII,S$GLB,, | Performed by: STUDENT IN AN ORGANIZED HEALTH CARE EDUCATION/TRAINING PROGRAM

## 2025-07-14 PROCEDURE — 99214 OFFICE O/P EST MOD 30 MIN: CPT | Mod: S$GLB,,, | Performed by: STUDENT IN AN ORGANIZED HEALTH CARE EDUCATION/TRAINING PROGRAM

## 2025-07-14 PROCEDURE — 1100F PTFALLS ASSESS-DOCD GE2>/YR: CPT | Mod: CPTII,S$GLB,, | Performed by: STUDENT IN AN ORGANIZED HEALTH CARE EDUCATION/TRAINING PROGRAM

## 2025-07-14 PROCEDURE — 1159F MED LIST DOCD IN RCRD: CPT | Mod: CPTII,S$GLB,, | Performed by: STUDENT IN AN ORGANIZED HEALTH CARE EDUCATION/TRAINING PROGRAM

## 2025-07-14 PROCEDURE — 99999 PR PBB SHADOW E&M-EST. PATIENT-LVL IV: CPT | Mod: PBBFAC,,, | Performed by: STUDENT IN AN ORGANIZED HEALTH CARE EDUCATION/TRAINING PROGRAM

## 2025-07-14 PROCEDURE — 1157F ADVNC CARE PLAN IN RCRD: CPT | Mod: CPTII,S$GLB,, | Performed by: STUDENT IN AN ORGANIZED HEALTH CARE EDUCATION/TRAINING PROGRAM

## 2025-07-14 PROCEDURE — 1126F AMNT PAIN NOTED NONE PRSNT: CPT | Mod: CPTII,S$GLB,, | Performed by: STUDENT IN AN ORGANIZED HEALTH CARE EDUCATION/TRAINING PROGRAM

## 2025-07-14 PROCEDURE — 3075F SYST BP GE 130 - 139MM HG: CPT | Mod: CPTII,S$GLB,, | Performed by: STUDENT IN AN ORGANIZED HEALTH CARE EDUCATION/TRAINING PROGRAM

## 2025-07-14 PROCEDURE — 3078F DIAST BP <80 MM HG: CPT | Mod: CPTII,S$GLB,, | Performed by: STUDENT IN AN ORGANIZED HEALTH CARE EDUCATION/TRAINING PROGRAM

## 2025-07-23 ENCOUNTER — OFFICE VISIT (OUTPATIENT)
Dept: OTOLARYNGOLOGY | Facility: CLINIC | Age: 75
End: 2025-07-23
Payer: MEDICARE

## 2025-07-23 DIAGNOSIS — Z97.4 WEARS HEARING AID IN BOTH EARS: ICD-10-CM

## 2025-07-23 DIAGNOSIS — H61.23 BILATERAL IMPACTED CERUMEN: Primary | ICD-10-CM

## 2025-07-23 PROCEDURE — 99999 PR PBB SHADOW E&M-EST. PATIENT-LVL III: CPT | Mod: PBBFAC,HCNC,, | Performed by: NURSE PRACTITIONER

## 2025-07-23 NOTE — PROCEDURES
Ear Cerumen Removal    Date/Time: 7/23/2025 1:00 PM    Performed by: Domenica Stapleton NP  Authorized by: Domenica Stapleton NP      Local anesthetic:  None  Location details:  Both ears  Procedure type: curette    Cerumen  Removal Results:  Cerumen completely removed  Patient tolerance:  Patient tolerated the procedure well with no immediate complications     Procedure Note:    The patient was brought to the minor procedure room and placed under the operating microscope of the right ear canal which was cleaned of ceruminous debris. Using a combination of suction, curettes and cup forceps the patient's cerumen was removed. The patient tolerated the procedure well. There were no complications.    Procedure Note:    The patient was brought to the minor procedure room and placed under the operating microscope of the left ear canal which was cleaned of ceruminous debris. Using a combination of suction, curettes and cup forceps the patient's cerumen was removed.  The patient tolerated the procedure well. There were no complications.      Routine ear cleaning- last cleaning was over 2 years ago. No associated ear complaints today.     Obtained hearing aids last week.     Needs routine ear cleanings -recommended q6 months.

## 2025-08-15 ENCOUNTER — PATIENT MESSAGE (OUTPATIENT)
Dept: REHABILITATION | Facility: HOSPITAL | Age: 75
End: 2025-08-15
Payer: MEDICARE

## 2025-08-18 ENCOUNTER — TELEPHONE (OUTPATIENT)
Dept: ENDOSCOPY | Facility: HOSPITAL | Age: 75
End: 2025-08-18
Payer: MEDICARE

## 2025-08-18 DIAGNOSIS — Z12.11 SPECIAL SCREENING FOR MALIGNANT NEOPLASMS, COLON: Primary | ICD-10-CM

## 2025-08-18 RX ORDER — SODIUM, POTASSIUM,MAG SULFATES 17.5-3.13G
1 SOLUTION, RECONSTITUTED, ORAL ORAL DAILY
Qty: 1 KIT | Refills: 0 | Status: ON HOLD | OUTPATIENT
Start: 2025-08-18 | End: 2025-08-20 | Stop reason: HOSPADM

## 2025-08-20 ENCOUNTER — ANESTHESIA EVENT (OUTPATIENT)
Dept: ENDOSCOPY | Facility: HOSPITAL | Age: 75
End: 2025-08-20
Payer: MEDICARE

## 2025-08-20 ENCOUNTER — ANESTHESIA (OUTPATIENT)
Dept: ENDOSCOPY | Facility: HOSPITAL | Age: 75
End: 2025-08-20
Payer: MEDICARE

## 2025-08-20 ENCOUNTER — HOSPITAL ENCOUNTER (OUTPATIENT)
Facility: HOSPITAL | Age: 75
Discharge: HOME | End: 2025-08-20
Attending: STUDENT IN AN ORGANIZED HEALTH CARE EDUCATION/TRAINING PROGRAM | Admitting: STUDENT IN AN ORGANIZED HEALTH CARE EDUCATION/TRAINING PROGRAM
Payer: MEDICARE

## 2025-08-20 VITALS
DIASTOLIC BLOOD PRESSURE: 74 MMHG | OXYGEN SATURATION: 100 % | BODY MASS INDEX: 20.92 KG/M2 | SYSTOLIC BLOOD PRESSURE: 154 MMHG | RESPIRATION RATE: 18 BRPM | TEMPERATURE: 98 F | HEART RATE: 60 BPM | WEIGHT: 138 LBS | HEIGHT: 68 IN

## 2025-08-20 DIAGNOSIS — Z12.11 COLON CANCER SCREENING: ICD-10-CM

## 2025-08-20 PROCEDURE — G0121 COLON CA SCRN NOT HI RSK IND: HCPCS | Mod: HCNC,GC,, | Performed by: STUDENT IN AN ORGANIZED HEALTH CARE EDUCATION/TRAINING PROGRAM

## 2025-08-20 PROCEDURE — 25000003 PHARM REV CODE 250: Mod: HCNC | Performed by: STUDENT IN AN ORGANIZED HEALTH CARE EDUCATION/TRAINING PROGRAM

## 2025-08-20 PROCEDURE — G0121 COLON CA SCRN NOT HI RSK IND: HCPCS | Mod: HCNC | Performed by: STUDENT IN AN ORGANIZED HEALTH CARE EDUCATION/TRAINING PROGRAM

## 2025-08-20 PROCEDURE — 37000008 HC ANESTHESIA 1ST 15 MINUTES: Mod: HCNC | Performed by: STUDENT IN AN ORGANIZED HEALTH CARE EDUCATION/TRAINING PROGRAM

## 2025-08-20 PROCEDURE — 37000009 HC ANESTHESIA EA ADD 15 MINS: Mod: HCNC | Performed by: STUDENT IN AN ORGANIZED HEALTH CARE EDUCATION/TRAINING PROGRAM

## 2025-08-20 PROCEDURE — 63600175 PHARM REV CODE 636 W HCPCS: Mod: HCNC

## 2025-08-20 RX ORDER — PROPOFOL 10 MG/ML
VIAL (ML) INTRAVENOUS
Status: DISCONTINUED | OUTPATIENT
Start: 2025-08-20 | End: 2025-08-20

## 2025-08-20 RX ORDER — SODIUM CHLORIDE 9 MG/ML
INJECTION, SOLUTION INTRAVENOUS CONTINUOUS
Status: DISCONTINUED | OUTPATIENT
Start: 2025-08-20 | End: 2025-08-20 | Stop reason: HOSPADM

## 2025-08-20 RX ORDER — LIDOCAINE HYDROCHLORIDE 20 MG/ML
INJECTION, SOLUTION EPIDURAL; INFILTRATION; INTRACAUDAL; PERINEURAL
Status: DISCONTINUED | OUTPATIENT
Start: 2025-08-20 | End: 2025-08-20

## 2025-08-20 RX ADMIN — SODIUM CHLORIDE: 0.9 INJECTION, SOLUTION INTRAVENOUS at 09:08

## 2025-08-20 RX ADMIN — PROPOFOL 50 MG: 10 INJECTION, EMULSION INTRAVENOUS at 09:08

## 2025-08-20 RX ADMIN — LIDOCAINE HYDROCHLORIDE 80 MG: 20 INJECTION, SOLUTION EPIDURAL; INFILTRATION; INTRACAUDAL; PERINEURAL at 09:08

## 2025-08-20 RX ADMIN — PROPOFOL 175 MCG/KG/MIN: 10 INJECTION, EMULSION INTRAVENOUS at 09:08

## 2025-08-26 ENCOUNTER — OFFICE VISIT (OUTPATIENT)
Dept: PODIATRY | Facility: CLINIC | Age: 75
End: 2025-08-26
Payer: MEDICARE

## 2025-08-26 VITALS
HEART RATE: 65 BPM | HEIGHT: 68 IN | BODY MASS INDEX: 20.54 KG/M2 | SYSTOLIC BLOOD PRESSURE: 130 MMHG | DIASTOLIC BLOOD PRESSURE: 75 MMHG | WEIGHT: 135.5 LBS

## 2025-08-26 DIAGNOSIS — M20.41 HAMMER TOES OF BOTH FEET: Primary | ICD-10-CM

## 2025-08-26 DIAGNOSIS — M54.16 LUMBAR RADICULOPATHY: ICD-10-CM

## 2025-08-26 DIAGNOSIS — G60.8 SENSORY PERIPHERAL NEUROPATHY: ICD-10-CM

## 2025-08-26 DIAGNOSIS — M20.42 HAMMER TOES OF BOTH FEET: Primary | ICD-10-CM

## 2025-08-26 PROCEDURE — 99999 PR PBB SHADOW E&M-EST. PATIENT-LVL III: CPT | Mod: PBBFAC,HCNC,, | Performed by: PODIATRIST

## 2025-08-29 ENCOUNTER — PATIENT MESSAGE (OUTPATIENT)
Dept: ENDOCRINOLOGY | Facility: CLINIC | Age: 75
End: 2025-08-29
Payer: MEDICARE

## (undated) DEVICE — COVER PROXIMA MAYO STAND

## (undated) DEVICE — DRAPE INCISE IOBAN 2 23X17IN

## (undated) DEVICE — PACK UPPER EXTREMITY BAPTIST

## (undated) DEVICE — NDL HYPO REG 25G X 1 1/2

## (undated) DEVICE — DRESSING TELFA STRL 4X3 LF

## (undated) DEVICE — GLOVE BIOGEL ECLIPSE SZ 7

## (undated) DEVICE — SOL POVIDONE SCRUB IODINE 4 OZ

## (undated) DEVICE — KIT WRENCH

## (undated) DEVICE — SEE MEDLINE ITEM 156905

## (undated) DEVICE — BIT DRILL QUICK RELEASE 2.8MM

## (undated) DEVICE — TUBE FRAZIER 5MM 2FT SOFT TIP

## (undated) DEVICE — GLOVE BIOGEL PI MICRO INDIC 7

## (undated) DEVICE — SLING ARM LARGE FOAM STRAP

## (undated) DEVICE — BIT DRILL CALIB QUIK REL 2.3

## (undated) DEVICE — SPLINT PLASTER FAST SET 5X30IN

## (undated) DEVICE — SEE MEDLINE ITEM 152622

## (undated) DEVICE — SYR B-D DISP CONTROL 10CC100/C

## (undated) DEVICE — BLADE SURGICAL 15C

## (undated) DEVICE — APPLICATOR CHLORAPREP ORN 26ML

## (undated) DEVICE — SUT ETHILON 4-0 PS2 18 BLK

## (undated) DEVICE — ELECTRODE NEEDLE 1IN

## (undated) DEVICE — SUT MONOCRYL 3-0 PS-2 UND

## (undated) DEVICE — SUT PROLENE 4-0 PS2 18 BLUE

## (undated) DEVICE — BURR RND FLUT SFT TOUCH 2.0MM

## (undated) DEVICE — Device

## (undated) DEVICE — ADHESIVE MASTISOL VIAL 48/BX

## (undated) DEVICE — DRAPE C-ARM MINI DISP

## (undated) DEVICE — SOL BETADINE 5%

## (undated) DEVICE — PAD CAST SPECIALIST STRL 4

## (undated) DEVICE — SPONGE COTTON TRAY 4X4IN

## (undated) DEVICE — SUT VICRYL PLUS 0 CT1 18IN

## (undated) DEVICE — BUCKET PLASTER DISPOSABLE

## (undated) DEVICE — GLASSES EYE PROTECTIVE

## (undated) DEVICE — SUT ETHIBOND 0 CR/CT-1 8-18

## (undated) DEVICE — DRESSING SURGICAL 1/2X1/2

## (undated) DEVICE — PENCIL ROCKER SWITCH 10FT CORD

## (undated) DEVICE — DRAPE C-ARM ELAS CLIP 42X120IN

## (undated) DEVICE — SEE MEDLINE ITEM 146313

## (undated) DEVICE — SUT MCRYL PLUS 4-0 PS2 27IN

## (undated) DEVICE — DRILL QUICK RELEASE 2.8MM 5IN

## (undated) DEVICE — SEE MEDLINE ITEM 146308

## (undated) DEVICE — FORCEP STRAIGHT DISP

## (undated) DEVICE — GAUZE SPONGE 4X4 12PLY

## (undated) DEVICE — ROUTER TAPERED 2.3MM

## (undated) DEVICE — GLOVE BIOGEL SKINSENSE PI 7.5

## (undated) DEVICE — HEMOSTAT SURGICEL 4X8IN

## (undated) DEVICE — STAPLER SEPTAL ENTACT

## (undated) DEVICE — SOL PVP-I SCRUB 7.5% 4OZ

## (undated) DEVICE — DRAPE STERI-DRAPE 1000 17X11IN

## (undated) DEVICE — SCREW BONE 2.3 X 24MM
Type: IMPLANTABLE DEVICE | Site: WRIST | Status: NON-FUNCTIONAL
Removed: 2020-01-24

## (undated) DEVICE — BLADE 4IN EDGE INSULATED

## (undated) DEVICE — PACK PACER PERMANENT

## (undated) DEVICE — KIT EVACUATOR 3-SPRING 1/8 DRN

## (undated) DEVICE — DRESSING AQUACEL FOAM 5 X 5

## (undated) DEVICE — SUT CTD VICRYL 2-0 CR/CT-2

## (undated) DEVICE — MARKER SKIN STND TIP BLUE BARR

## (undated) DEVICE — ADHESIVE DERMABOND ADVANCED

## (undated) DEVICE — SUT MILD CHROMIC 6-0 C1

## (undated) DEVICE — SUT 5/0 18IN COATED VICRYL

## (undated) DEVICE — NDL SAFETY 22G X 1.5 ECLIPSE

## (undated) DEVICE — SPLINT REUTER BIVALVE 0.5MM

## (undated) DEVICE — SUT PROLENE 6-0 P-1 18

## (undated) DEVICE — DRESSING AQUACEL SACRAL 9 X 9

## (undated) DEVICE — DRAPE THREE-QTR REINF 53X77IN

## (undated) DEVICE — ELECTRODE BLADE INSULATED 1 IN

## (undated) DEVICE — TRAY CATH FOL SIL URIMTR 16FR

## (undated) DEVICE — DRAIN HEMOVAC 3/16 LARGE

## (undated) DEVICE — BIT DRILL QUICK RELEASE 2.0MM

## (undated) DEVICE — SYR SLIP TIP 1CC

## (undated) DEVICE — PAD UNDERPAD 30X30

## (undated) DEVICE — DRESSING AQUACEL FOAM 3 X 3

## (undated) DEVICE — SOL 9P NACL IRR PIC IL

## (undated) DEVICE — SOL IRR SOD CHL .9% POUR

## (undated) DEVICE — DRESSING COVER AQUACEL AG SURG

## (undated) DEVICE — SEE MEDLINE ITEM 156934

## (undated) DEVICE — UNDERPAD ULTRASORB 300LB 30X36

## (undated) DEVICE — TOURNIQUET SB QC DP 18X4IN

## (undated) DEVICE — DRAPE STERI INSTRUMENT 1018

## (undated) DEVICE — BLADE SURG STAINLESS STEEL #15

## (undated) DEVICE — KIT SURGIFLO HEMOSTATIC MATRIX

## (undated) DEVICE — DRESSING MEPILEX BORDER 4 X 4

## (undated) DEVICE — DRESSING N ADH OIL EMUL 3X3

## (undated) DEVICE — GLOVE BIOGEL ECLIPSE SZ 7.5

## (undated) DEVICE — APPLICATOR Q-TIPS BULK 3 INCH

## (undated) DEVICE — SUT STRATAFIX 1 PDS CT-1

## (undated) DEVICE — SEE MEDLINE ITEM 152487

## (undated) DEVICE — CORD BIPOLAR 12 FOOT

## (undated) DEVICE — BUR BONE CUT MICRO TPS 3X3.8MM

## (undated) DEVICE — GUIDEWIRE ORTHO .054 X 6 IN
Type: IMPLANTABLE DEVICE | Site: WRIST | Status: NON-FUNCTIONAL
Removed: 2020-01-24

## (undated) DEVICE — ELECTRODE REM PLYHSV RETURN 9

## (undated) DEVICE — NDL ECLIPSE SAFETY 23G 1.5IN

## (undated) DEVICE — SEE MEDLINE ITEM 157194

## (undated) DEVICE — DRESSING XEROFORM FOIL PK 1X8

## (undated) DEVICE — DRESSING EYE OVAL LF

## (undated) DEVICE — DRESSING ADH ISLAND 3.6 X 14

## (undated) DEVICE — BLADE PLASMA WIDE SPATULA TIP

## (undated) DEVICE — SEE MEDLINE ITEM 157117

## (undated) DEVICE — SUT 5-0 CHROMIC GUT / P-3

## (undated) DEVICE — SYS CLSR DERMABOND PRINEO 22CM

## (undated) DEVICE — SKINMARKER & RULER REGULAR X-F

## (undated) DEVICE — SUT VICRYL 4-0 27 SH

## (undated) DEVICE — SEE MEDLINE ITEM 157131

## (undated) DEVICE — DRESSING XEROFORM NONADH 1X8IN

## (undated) DEVICE — SUT MONOCRYL 5-0 P-3 UND 18

## (undated) DEVICE — SPONGE DERMACEA 4X4IN 12PLY

## (undated) DEVICE — SUT MONOCRYL PLUS UD 3-0 27

## (undated) DEVICE — PACK SET UP CONVERTORS

## (undated) DEVICE — SUT ETHILON 6-0 BLK P-1 BLK

## (undated) DEVICE — CAUTERY BOVIE PENCIL

## (undated) DEVICE — DRAPE T THYROID STERILE

## (undated) DEVICE — STAPLER SKIN ROTATING HEAD

## (undated) DEVICE — PAD DEFIB CADENCE ADULT R2

## (undated) DEVICE — BANDAGE GAUZE 6PLY FLUFF 2X3

## (undated) DEVICE — SUT 4/0 18IN PROLENE BL MON

## (undated) DEVICE — NDL 22GA X1 1/2 REG BEVEL

## (undated) DEVICE — SPONGE GELFOAM 12-7MM

## (undated) DEVICE — TAPE SILK 3IN

## (undated) DEVICE — BLADE MILL BONE MEDIUM

## (undated) DEVICE — DRAPE SURG W/TWL 17 5/8X23

## (undated) DEVICE — SPONGE PATTY SURGICAL .5X3IN

## (undated) DEVICE — BANDAGE MATRIX HK LOOP 4IN 5YD

## (undated) DEVICE — TRAY DRY SKIN SCRUB PREP

## (undated) DEVICE — SEALER AQUAMANTYS 2.3 BIPOLAR

## (undated) DEVICE — BANDAGE ELASTOPLAST 3INX5YDS